# Patient Record
Sex: MALE | Race: OTHER | NOT HISPANIC OR LATINO | Employment: OTHER | URBAN - METROPOLITAN AREA
[De-identification: names, ages, dates, MRNs, and addresses within clinical notes are randomized per-mention and may not be internally consistent; named-entity substitution may affect disease eponyms.]

---

## 2017-01-18 ENCOUNTER — APPOINTMENT (INPATIENT)
Dept: ULTRASOUND IMAGING | Facility: HOSPITAL | Age: 62
DRG: 381 | End: 2017-01-18
Payer: MEDICARE

## 2017-01-18 ENCOUNTER — HOSPITAL ENCOUNTER (INPATIENT)
Facility: HOSPITAL | Age: 62
LOS: 5 days | Discharge: HOME HEALTH | DRG: 381 | End: 2017-01-23
Attending: INTERNAL MEDICINE | Admitting: INTERNAL MEDICINE
Payer: MEDICARE

## 2017-01-18 DIAGNOSIS — I25.10 CAD (CORONARY ARTERY DISEASE): Primary | ICD-10-CM

## 2017-01-18 DIAGNOSIS — C91.10 CLL (CHRONIC LYMPHOCYTIC LEUKEMIA) (HCC): ICD-10-CM

## 2017-01-18 DIAGNOSIS — B37.81 CANDIDA ESOPHAGITIS (HCC): ICD-10-CM

## 2017-01-18 DIAGNOSIS — N17.9 ACUTE RENAL FAILURE, UNSPECIFIED ACUTE RENAL FAILURE TYPE (HCC): ICD-10-CM

## 2017-01-18 PROBLEM — K21.00 GASTROESOPHAGEAL REFLUX DISEASE WITH ESOPHAGITIS: Status: ACTIVE | Noted: 2017-01-18

## 2017-01-18 LAB
ALBUMIN SERPL BCP-MCNC: 3.6 G/DL (ref 3.5–5)
ALP SERPL-CCNC: 46 U/L (ref 46–116)
ALT SERPL W P-5'-P-CCNC: 17 U/L (ref 12–78)
ANION GAP SERPL CALCULATED.3IONS-SCNC: 8 MMOL/L (ref 4–13)
AST SERPL W P-5'-P-CCNC: 15 U/L (ref 5–45)
ATRIAL RATE: 64 BPM
BASOPHILS # BLD MANUAL: 0 THOUSAND/UL (ref 0–0.1)
BASOPHILS NFR MAR MANUAL: 0 % (ref 0–1)
BILIRUB SERPL-MCNC: 0.41 MG/DL (ref 0.2–1)
BUN SERPL-MCNC: 26 MG/DL (ref 5–25)
CALCIUM SERPL-MCNC: 8.5 MG/DL (ref 8.3–10.1)
CHLORIDE SERPL-SCNC: 107 MMOL/L (ref 100–108)
CO2 SERPL-SCNC: 27 MMOL/L (ref 21–32)
CREAT SERPL-MCNC: 1.43 MG/DL (ref 0.6–1.3)
EOSINOPHIL # BLD MANUAL: 0.17 THOUSAND/UL (ref 0–0.4)
EOSINOPHIL NFR BLD MANUAL: 1 % (ref 0–6)
ERYTHROCYTE [DISTWIDTH] IN BLOOD BY AUTOMATED COUNT: 15 % (ref 11.6–15.1)
GFR SERPL CREATININE-BSD FRML MDRD: 50.1 ML/MIN/1.73SQ M
GLUCOSE SERPL-MCNC: 111 MG/DL (ref 65–140)
HCT VFR BLD AUTO: 32.1 % (ref 36.5–49.3)
HGB BLD-MCNC: 10.7 G/DL (ref 12–17)
INR PPP: 1.09 (ref 0.86–1.16)
LYMPHOCYTES # BLD AUTO: 15.31 THOUSAND/UL (ref 0.6–4.47)
LYMPHOCYTES # BLD AUTO: 90 % (ref 14–44)
MAGNESIUM SERPL-MCNC: 1.7 MG/DL (ref 1.6–2.6)
MCH RBC QN AUTO: 32.4 PG (ref 26.8–34.3)
MCHC RBC AUTO-ENTMCNC: 33.3 G/DL (ref 31.4–37.4)
MCV RBC AUTO: 97 FL (ref 82–98)
MONOCYTES # BLD AUTO: 0.17 THOUSAND/UL (ref 0–1.22)
MONOCYTES NFR BLD: 1 % (ref 4–12)
NEUTROPHILS # BLD MANUAL: 1.36 THOUSAND/UL (ref 1.85–7.62)
NEUTS SEG NFR BLD AUTO: 8 % (ref 43–75)
P AXIS: 33 DEGREES
PHOSPHATE SERPL-MCNC: 3.6 MG/DL (ref 2.3–4.1)
PLATELET # BLD AUTO: 62 THOUSANDS/UL (ref 149–390)
PLATELET BLD QL SMEAR: ABNORMAL
PMV BLD AUTO: 9.9 FL (ref 8.9–12.7)
POTASSIUM SERPL-SCNC: 3.9 MMOL/L (ref 3.5–5.3)
PR INTERVAL: 150 MS
PROT SERPL-MCNC: 5.7 G/DL (ref 6.4–8.2)
PROTHROMBIN TIME: 14.1 SECONDS (ref 12–14.3)
PTH-INTACT SERPL-MCNC: 39.8 PG/ML (ref 14–72)
QRS AXIS: -7 DEGREES
QRSD INTERVAL: 86 MS
QT INTERVAL: 432 MS
QTC INTERVAL: 445 MS
RBC # BLD AUTO: 3.3 MILLION/UL (ref 3.88–5.62)
RBC MORPH BLD: NORMAL
SODIUM SERPL-SCNC: 142 MMOL/L (ref 136–145)
T WAVE AXIS: 12 DEGREES
TOTAL CELLS COUNTED SPEC: 100
VENTRICULAR RATE: 64 BPM
WBC # BLD AUTO: 17.01 THOUSAND/UL (ref 4.31–10.16)

## 2017-01-18 PROCEDURE — 84165 PROTEIN E-PHORESIS SERUM: CPT | Performed by: INTERNAL MEDICINE

## 2017-01-18 PROCEDURE — 85610 PROTHROMBIN TIME: CPT | Performed by: INTERNAL MEDICINE

## 2017-01-18 PROCEDURE — 76770 US EXAM ABDO BACK WALL COMP: CPT

## 2017-01-18 PROCEDURE — 93005 ELECTROCARDIOGRAM TRACING: CPT | Performed by: INTERNAL MEDICINE

## 2017-01-18 PROCEDURE — 83970 ASSAY OF PARATHORMONE: CPT | Performed by: INTERNAL MEDICINE

## 2017-01-18 PROCEDURE — 85027 COMPLETE CBC AUTOMATED: CPT | Performed by: INTERNAL MEDICINE

## 2017-01-18 PROCEDURE — 80053 COMPREHEN METABOLIC PANEL: CPT | Performed by: INTERNAL MEDICINE

## 2017-01-18 PROCEDURE — 84100 ASSAY OF PHOSPHORUS: CPT | Performed by: INTERNAL MEDICINE

## 2017-01-18 PROCEDURE — 86334 IMMUNOFIX E-PHORESIS SERUM: CPT | Performed by: INTERNAL MEDICINE

## 2017-01-18 PROCEDURE — 85007 BL SMEAR W/DIFF WBC COUNT: CPT | Performed by: INTERNAL MEDICINE

## 2017-01-18 PROCEDURE — 83735 ASSAY OF MAGNESIUM: CPT | Performed by: INTERNAL MEDICINE

## 2017-01-18 RX ORDER — GABAPENTIN 100 MG/1
100 CAPSULE ORAL 2 TIMES DAILY
Status: DISCONTINUED | OUTPATIENT
Start: 2017-01-18 | End: 2017-01-23 | Stop reason: HOSPADM

## 2017-01-18 RX ORDER — DIPHENHYDRAMINE HCL 25 MG
25 TABLET ORAL EVERY 6 HOURS PRN
Status: DISCONTINUED | OUTPATIENT
Start: 2017-01-18 | End: 2017-01-18

## 2017-01-18 RX ORDER — RANOLAZINE 500 MG/1
500 TABLET, EXTENDED RELEASE ORAL DAILY
Status: DISCONTINUED | OUTPATIENT
Start: 2017-01-18 | End: 2017-01-23 | Stop reason: HOSPADM

## 2017-01-18 RX ORDER — MORPHINE SULFATE 2 MG/ML
2 INJECTION, SOLUTION INTRAMUSCULAR; INTRAVENOUS EVERY 4 HOURS PRN
Status: DISCONTINUED | OUTPATIENT
Start: 2017-01-18 | End: 2017-01-23 | Stop reason: HOSPADM

## 2017-01-18 RX ORDER — ASPIRIN 81 MG/1
81 TABLET, CHEWABLE ORAL DAILY
Status: DISCONTINUED | OUTPATIENT
Start: 2017-01-18 | End: 2017-01-23 | Stop reason: HOSPADM

## 2017-01-18 RX ORDER — METOPROLOL TARTRATE 50 MG/1
50 TABLET, FILM COATED ORAL DAILY
Status: DISCONTINUED | OUTPATIENT
Start: 2017-01-18 | End: 2017-01-23 | Stop reason: HOSPADM

## 2017-01-18 RX ORDER — SODIUM CHLORIDE 9 MG/ML
75 INJECTION, SOLUTION INTRAVENOUS CONTINUOUS
Status: DISCONTINUED | OUTPATIENT
Start: 2017-01-18 | End: 2017-01-23 | Stop reason: HOSPADM

## 2017-01-18 RX ORDER — HYDROCODONE BITARTRATE AND ACETAMINOPHEN 5; 325 MG/1; MG/1
1 TABLET ORAL EVERY 6 HOURS PRN
Status: DISCONTINUED | OUTPATIENT
Start: 2017-01-18 | End: 2017-01-23 | Stop reason: HOSPADM

## 2017-01-18 RX ORDER — DOCUSATE SODIUM 100 MG/1
100 CAPSULE, LIQUID FILLED ORAL 2 TIMES DAILY PRN
Status: DISCONTINUED | OUTPATIENT
Start: 2017-01-18 | End: 2017-01-23 | Stop reason: HOSPADM

## 2017-01-18 RX ORDER — FOLIC ACID 1 MG/1
1 TABLET ORAL DAILY
Status: DISCONTINUED | OUTPATIENT
Start: 2017-01-18 | End: 2017-01-23 | Stop reason: HOSPADM

## 2017-01-18 RX ORDER — CITALOPRAM 20 MG/1
20 TABLET ORAL DAILY
Status: DISCONTINUED | OUTPATIENT
Start: 2017-01-18 | End: 2017-01-23 | Stop reason: HOSPADM

## 2017-01-18 RX ORDER — FENOFIBRATE 67 MG/1
67 CAPSULE ORAL
Status: DISCONTINUED | OUTPATIENT
Start: 2017-01-19 | End: 2017-01-18

## 2017-01-18 RX ORDER — ALBUTEROL SULFATE 2.5 MG/3ML
2.5 SOLUTION RESPIRATORY (INHALATION) EVERY 6 HOURS PRN
Status: DISCONTINUED | OUTPATIENT
Start: 2017-01-18 | End: 2017-01-21

## 2017-01-18 RX ORDER — PREDNISONE 20 MG/1
10 TABLET ORAL DAILY
Status: DISCONTINUED | OUTPATIENT
Start: 2017-01-19 | End: 2017-01-22

## 2017-01-18 RX ORDER — ONDANSETRON 2 MG/ML
4 INJECTION INTRAMUSCULAR; INTRAVENOUS EVERY 4 HOURS PRN
Status: DISCONTINUED | OUTPATIENT
Start: 2017-01-18 | End: 2017-01-23 | Stop reason: HOSPADM

## 2017-01-18 RX ORDER — CLOPIDOGREL BISULFATE 75 MG/1
75 TABLET ORAL DAILY
Status: DISCONTINUED | OUTPATIENT
Start: 2017-01-18 | End: 2017-01-23 | Stop reason: HOSPADM

## 2017-01-18 RX ORDER — PREDNISONE 20 MG/1
20 TABLET ORAL DAILY
Status: DISCONTINUED | OUTPATIENT
Start: 2017-01-18 | End: 2017-01-18

## 2017-01-18 RX ORDER — ACETAMINOPHEN 325 MG/1
650 TABLET ORAL EVERY 6 HOURS PRN
Status: DISCONTINUED | OUTPATIENT
Start: 2017-01-18 | End: 2017-01-23 | Stop reason: HOSPADM

## 2017-01-18 RX ADMIN — ACYCLOVIR SODIUM 800 MG: 50 INJECTION, SOLUTION INTRAVENOUS at 22:05

## 2017-01-18 RX ADMIN — SODIUM CHLORIDE 100 ML/HR: 0.9 INJECTION, SOLUTION INTRAVENOUS at 10:36

## 2017-01-18 RX ADMIN — ACYCLOVIR SODIUM 800 MG: 50 INJECTION, SOLUTION INTRAVENOUS at 11:34

## 2017-01-18 RX ADMIN — GABAPENTIN 100 MG: 100 CAPSULE ORAL at 18:56

## 2017-01-18 RX ADMIN — SODIUM CHLORIDE 100 ML/HR: 0.9 INJECTION, SOLUTION INTRAVENOUS at 22:04

## 2017-01-19 LAB — 25(OH)D3 SERPL-MCNC: 39.7 NG/ML (ref 30–100)

## 2017-01-19 PROCEDURE — 94640 AIRWAY INHALATION TREATMENT: CPT

## 2017-01-19 PROCEDURE — 94760 N-INVAS EAR/PLS OXIMETRY 1: CPT

## 2017-01-19 PROCEDURE — 82306 VITAMIN D 25 HYDROXY: CPT | Performed by: INTERNAL MEDICINE

## 2017-01-19 RX ORDER — LISINOPRIL 10 MG/1
10 TABLET ORAL DAILY
Status: DISCONTINUED | OUTPATIENT
Start: 2017-01-19 | End: 2017-01-23 | Stop reason: HOSPADM

## 2017-01-19 RX ADMIN — GABAPENTIN 100 MG: 100 CAPSULE ORAL at 18:06

## 2017-01-19 RX ADMIN — ACYCLOVIR SODIUM 800 MG: 50 INJECTION, SOLUTION INTRAVENOUS at 11:13

## 2017-01-19 RX ADMIN — RANOLAZINE 500 MG: 500 TABLET, FILM COATED, EXTENDED RELEASE ORAL at 08:42

## 2017-01-19 RX ADMIN — GABAPENTIN 100 MG: 100 CAPSULE ORAL at 08:41

## 2017-01-19 RX ADMIN — PREDNISONE 10 MG: 20 TABLET ORAL at 08:41

## 2017-01-19 RX ADMIN — ASPIRIN 81 MG 81 MG: 81 TABLET ORAL at 08:41

## 2017-01-19 RX ADMIN — SODIUM CHLORIDE 100 ML/HR: 0.9 INJECTION, SOLUTION INTRAVENOUS at 19:52

## 2017-01-19 RX ADMIN — CLOPIDOGREL BISULFATE 75 MG: 75 TABLET ORAL at 08:41

## 2017-01-19 RX ADMIN — METOPROLOL TARTRATE 50 MG: 50 TABLET, FILM COATED ORAL at 08:41

## 2017-01-19 RX ADMIN — ACYCLOVIR SODIUM 800 MG: 50 INJECTION, SOLUTION INTRAVENOUS at 23:47

## 2017-01-19 RX ADMIN — FOLIC ACID 1 MG: 1 TABLET ORAL at 08:41

## 2017-01-19 RX ADMIN — SODIUM CHLORIDE 100 ML/HR: 0.9 INJECTION, SOLUTION INTRAVENOUS at 09:09

## 2017-01-19 RX ADMIN — CITALOPRAM HYDROBROMIDE 20 MG: 20 TABLET ORAL at 08:41

## 2017-01-19 RX ADMIN — LISINOPRIL 10 MG: 10 TABLET ORAL at 11:14

## 2017-01-19 RX ADMIN — ALBUTEROL SULFATE 2.5 MG: 2.5 SOLUTION RESPIRATORY (INHALATION) at 10:19

## 2017-01-20 LAB
ALBUMIN SERPL ELPH-MCNC: 3.71 G/DL (ref 3.5–5)
ALBUMIN SERPL ELPH-MCNC: 71.3 % (ref 52–65)
ALPHA1 GLOB SERPL ELPH-MCNC: 0.33 G/DL (ref 0.1–0.4)
ALPHA1 GLOB SERPL ELPH-MCNC: 6.4 % (ref 2.5–5)
ALPHA2 GLOB SERPL ELPH-MCNC: 0.6 G/DL (ref 0.4–1.2)
ALPHA2 GLOB SERPL ELPH-MCNC: 11.5 % (ref 7–13)
ANION GAP SERPL CALCULATED.3IONS-SCNC: 9 MMOL/L (ref 4–13)
BASOPHILS # BLD MANUAL: 0 THOUSAND/UL (ref 0–0.1)
BASOPHILS NFR MAR MANUAL: 0 % (ref 0–1)
BETA GLOB ABNORMAL SERPL ELPH-MCNC: 0.32 G/DL (ref 0.4–0.8)
BETA1 GLOB SERPL ELPH-MCNC: 6.2 % (ref 5–13)
BETA2 GLOB SERPL ELPH-MCNC: 3 % (ref 2–8)
BETA2+GAMMA GLOB SERPL ELPH-MCNC: 0.16 G/DL (ref 0.2–0.5)
BUN SERPL-MCNC: 23 MG/DL (ref 5–25)
CALCIUM SERPL-MCNC: 7.7 MG/DL (ref 8.3–10.1)
CHLORIDE SERPL-SCNC: 110 MMOL/L (ref 100–108)
CO2 SERPL-SCNC: 26 MMOL/L (ref 21–32)
CREAT SERPL-MCNC: 1.23 MG/DL (ref 0.6–1.3)
EOSINOPHIL # BLD MANUAL: 0.13 THOUSAND/UL (ref 0–0.4)
EOSINOPHIL NFR BLD MANUAL: 1 % (ref 0–6)
ERYTHROCYTE [DISTWIDTH] IN BLOOD BY AUTOMATED COUNT: 15.4 % (ref 11.6–15.1)
GAMMA GLOB ABNORMAL SERPL ELPH-MCNC: 0.08 G/DL (ref 0.5–1.6)
GAMMA GLOB SERPL ELPH-MCNC: 1.6 % (ref 12–22)
GFR SERPL CREATININE-BSD FRML MDRD: 59.6 ML/MIN/1.73SQ M
GLUCOSE SERPL-MCNC: 85 MG/DL (ref 65–140)
HCT VFR BLD AUTO: 29.6 % (ref 36.5–49.3)
HGB BLD-MCNC: 9.7 G/DL (ref 12–17)
IGG/ALB SER: 2.48 {RATIO} (ref 1.1–1.8)
INTERPRETATION UR IFE-IMP: NORMAL
LYMPHOCYTES # BLD AUTO: 11 THOUSAND/UL (ref 0.6–4.47)
LYMPHOCYTES # BLD AUTO: 87 % (ref 14–44)
M PROTEIN 1 MFR SERPL ELPH: 0.6 %
M PROTEIN 1 SERPL ELPH-MCNC: 0.03 G/DL
MCH RBC QN AUTO: 32 PG (ref 26.8–34.3)
MCHC RBC AUTO-ENTMCNC: 32.8 G/DL (ref 31.4–37.4)
MCV RBC AUTO: 98 FL (ref 82–98)
METAMYELOCYTES NFR BLD MANUAL: 2 % (ref 0–1)
MONOCYTES # BLD AUTO: 0.38 THOUSAND/UL (ref 0–1.22)
MONOCYTES NFR BLD: 3 % (ref 4–12)
NEUTROPHILS # BLD MANUAL: 0.63 THOUSAND/UL (ref 1.85–7.62)
NEUTS BAND NFR BLD MANUAL: 1 % (ref 0–8)
NEUTS SEG NFR BLD AUTO: 4 % (ref 43–75)
NRBC BLD AUTO-RTO: 0 /100 WBCS
PLATELET # BLD AUTO: 47 THOUSANDS/UL (ref 149–390)
PLATELET BLD QL SMEAR: ABNORMAL
PMV BLD AUTO: 10 FL (ref 8.9–12.7)
POTASSIUM SERPL-SCNC: 3.5 MMOL/L (ref 3.5–5.3)
PROT SERPL-MCNC: 5.2 G/DL (ref 6.4–8.2)
RBC # BLD AUTO: 3.03 MILLION/UL (ref 3.88–5.62)
RBC MORPH BLD: NORMAL
SODIUM SERPL-SCNC: 145 MMOL/L (ref 136–145)
TOTAL CELLS COUNTED SPEC: 100
VARIANT LYMPHS # BLD AUTO: 2 %
WBC # BLD AUTO: 12.64 THOUSAND/UL (ref 4.31–10.16)

## 2017-01-20 PROCEDURE — 94760 N-INVAS EAR/PLS OXIMETRY 1: CPT

## 2017-01-20 PROCEDURE — 85027 COMPLETE CBC AUTOMATED: CPT | Performed by: INTERNAL MEDICINE

## 2017-01-20 PROCEDURE — 80048 BASIC METABOLIC PNL TOTAL CA: CPT | Performed by: INTERNAL MEDICINE

## 2017-01-20 PROCEDURE — 85007 BL SMEAR W/DIFF WBC COUNT: CPT | Performed by: INTERNAL MEDICINE

## 2017-01-20 RX ADMIN — RANOLAZINE 500 MG: 500 TABLET, FILM COATED, EXTENDED RELEASE ORAL at 13:32

## 2017-01-20 RX ADMIN — SODIUM CHLORIDE 100 ML/HR: 0.9 INJECTION, SOLUTION INTRAVENOUS at 06:00

## 2017-01-20 RX ADMIN — GABAPENTIN 100 MG: 100 CAPSULE ORAL at 10:13

## 2017-01-20 RX ADMIN — LISINOPRIL 10 MG: 10 TABLET ORAL at 10:14

## 2017-01-20 RX ADMIN — SODIUM CHLORIDE 100 ML/HR: 0.9 INJECTION, SOLUTION INTRAVENOUS at 16:03

## 2017-01-20 RX ADMIN — ASPIRIN 81 MG 81 MG: 81 TABLET ORAL at 10:13

## 2017-01-20 RX ADMIN — CLOPIDOGREL BISULFATE 75 MG: 75 TABLET ORAL at 10:12

## 2017-01-20 RX ADMIN — ACYCLOVIR SODIUM 800 MG: 50 INJECTION, SOLUTION INTRAVENOUS at 10:19

## 2017-01-20 RX ADMIN — PREDNISONE 10 MG: 20 TABLET ORAL at 10:13

## 2017-01-20 RX ADMIN — CITALOPRAM HYDROBROMIDE 20 MG: 20 TABLET ORAL at 10:12

## 2017-01-20 RX ADMIN — METOPROLOL TARTRATE 50 MG: 50 TABLET, FILM COATED ORAL at 10:13

## 2017-01-20 RX ADMIN — FOLIC ACID 1 MG: 1 TABLET ORAL at 10:12

## 2017-01-20 RX ADMIN — GABAPENTIN 100 MG: 100 CAPSULE ORAL at 18:48

## 2017-01-21 LAB
ANION GAP SERPL CALCULATED.3IONS-SCNC: 8 MMOL/L (ref 4–13)
BUN SERPL-MCNC: 20 MG/DL (ref 5–25)
CALCIUM SERPL-MCNC: 8.4 MG/DL (ref 8.3–10.1)
CHLORIDE SERPL-SCNC: 110 MMOL/L (ref 100–108)
CO2 SERPL-SCNC: 25 MMOL/L (ref 21–32)
CREAT SERPL-MCNC: 1.26 MG/DL (ref 0.6–1.3)
GFR SERPL CREATININE-BSD FRML MDRD: 58 ML/MIN/1.73SQ M
GLUCOSE SERPL-MCNC: 80 MG/DL (ref 65–140)
POTASSIUM SERPL-SCNC: 3.4 MMOL/L (ref 3.5–5.3)
SODIUM SERPL-SCNC: 143 MMOL/L (ref 136–145)

## 2017-01-21 PROCEDURE — 80048 BASIC METABOLIC PNL TOTAL CA: CPT | Performed by: INTERNAL MEDICINE

## 2017-01-21 RX ORDER — POTASSIUM CHLORIDE 20 MEQ/1
20 TABLET, EXTENDED RELEASE ORAL DAILY
Status: DISCONTINUED | OUTPATIENT
Start: 2017-01-21 | End: 2017-01-23 | Stop reason: HOSPADM

## 2017-01-21 RX ORDER — POTASSIUM CHLORIDE 20 MEQ/1
20 TABLET, EXTENDED RELEASE ORAL ONCE
Status: COMPLETED | OUTPATIENT
Start: 2017-01-21 | End: 2017-01-21

## 2017-01-21 RX ADMIN — ACYCLOVIR SODIUM 600 MG: 50 INJECTION, SOLUTION INTRAVENOUS at 23:05

## 2017-01-21 RX ADMIN — ACYCLOVIR SODIUM 600 MG: 50 INJECTION, SOLUTION INTRAVENOUS at 00:04

## 2017-01-21 RX ADMIN — POTASSIUM CHLORIDE 20 MEQ: 1500 TABLET, EXTENDED RELEASE ORAL at 13:12

## 2017-01-21 RX ADMIN — GABAPENTIN 100 MG: 100 CAPSULE ORAL at 16:58

## 2017-01-21 RX ADMIN — FOLIC ACID 1 MG: 1 TABLET ORAL at 08:48

## 2017-01-21 RX ADMIN — ACYCLOVIR SODIUM 600 MG: 50 INJECTION, SOLUTION INTRAVENOUS at 11:36

## 2017-01-21 RX ADMIN — POTASSIUM CHLORIDE 20 MEQ: 1500 TABLET, EXTENDED RELEASE ORAL at 08:47

## 2017-01-21 RX ADMIN — PREDNISONE 10 MG: 20 TABLET ORAL at 08:48

## 2017-01-21 RX ADMIN — SODIUM CHLORIDE 100 ML/HR: 0.9 INJECTION, SOLUTION INTRAVENOUS at 04:20

## 2017-01-21 RX ADMIN — ACETAMINOPHEN 650 MG: 325 TABLET, FILM COATED ORAL at 05:55

## 2017-01-21 RX ADMIN — RANOLAZINE 500 MG: 500 TABLET, FILM COATED, EXTENDED RELEASE ORAL at 08:49

## 2017-01-21 RX ADMIN — METOPROLOL TARTRATE 50 MG: 50 TABLET, FILM COATED ORAL at 08:49

## 2017-01-21 RX ADMIN — CITALOPRAM HYDROBROMIDE 20 MG: 20 TABLET ORAL at 08:48

## 2017-01-21 RX ADMIN — SODIUM CHLORIDE 100 ML/HR: 0.9 INJECTION, SOLUTION INTRAVENOUS at 17:45

## 2017-01-21 RX ADMIN — ASPIRIN 81 MG 81 MG: 81 TABLET ORAL at 08:47

## 2017-01-21 RX ADMIN — GABAPENTIN 100 MG: 100 CAPSULE ORAL at 08:48

## 2017-01-21 RX ADMIN — CLOPIDOGREL BISULFATE 75 MG: 75 TABLET ORAL at 08:47

## 2017-01-21 RX ADMIN — LISINOPRIL 10 MG: 10 TABLET ORAL at 08:47

## 2017-01-22 LAB
ANION GAP SERPL CALCULATED.3IONS-SCNC: 9 MMOL/L (ref 4–13)
BUN SERPL-MCNC: 15 MG/DL (ref 5–25)
CALCIUM SERPL-MCNC: 8.4 MG/DL (ref 8.3–10.1)
CHLORIDE SERPL-SCNC: 111 MMOL/L (ref 100–108)
CO2 SERPL-SCNC: 25 MMOL/L (ref 21–32)
CREAT SERPL-MCNC: 1.38 MG/DL (ref 0.6–1.3)
GFR SERPL CREATININE-BSD FRML MDRD: 52.2 ML/MIN/1.73SQ M
GLUCOSE SERPL-MCNC: 75 MG/DL (ref 65–140)
POTASSIUM SERPL-SCNC: 3.3 MMOL/L (ref 3.5–5.3)
SODIUM SERPL-SCNC: 145 MMOL/L (ref 136–145)

## 2017-01-22 PROCEDURE — 80048 BASIC METABOLIC PNL TOTAL CA: CPT | Performed by: INTERNAL MEDICINE

## 2017-01-22 RX ORDER — PREDNISONE 1 MG/1
5 TABLET ORAL DAILY
Status: DISCONTINUED | OUTPATIENT
Start: 2017-01-23 | End: 2017-01-23 | Stop reason: HOSPADM

## 2017-01-22 RX ADMIN — ACYCLOVIR SODIUM 600 MG: 50 INJECTION, SOLUTION INTRAVENOUS at 22:31

## 2017-01-22 RX ADMIN — GABAPENTIN 100 MG: 100 CAPSULE ORAL at 17:42

## 2017-01-22 RX ADMIN — RANOLAZINE 500 MG: 500 TABLET, FILM COATED, EXTENDED RELEASE ORAL at 08:52

## 2017-01-22 RX ADMIN — HYDROCODONE BITARTRATE AND ACETAMINOPHEN 1 TABLET: 5; 325 TABLET ORAL at 06:06

## 2017-01-22 RX ADMIN — HYDROCODONE BITARTRATE AND ACETAMINOPHEN 1 TABLET: 5; 325 TABLET ORAL at 22:29

## 2017-01-22 RX ADMIN — SODIUM CHLORIDE 100 ML/HR: 0.9 INJECTION, SOLUTION INTRAVENOUS at 15:52

## 2017-01-22 RX ADMIN — LISINOPRIL 10 MG: 10 TABLET ORAL at 08:52

## 2017-01-22 RX ADMIN — SODIUM CHLORIDE 100 ML/HR: 0.9 INJECTION, SOLUTION INTRAVENOUS at 12:32

## 2017-01-22 RX ADMIN — CITALOPRAM HYDROBROMIDE 20 MG: 20 TABLET ORAL at 08:52

## 2017-01-22 RX ADMIN — FOLIC ACID 1 MG: 1 TABLET ORAL at 08:52

## 2017-01-22 RX ADMIN — ACYCLOVIR SODIUM 600 MG: 50 INJECTION, SOLUTION INTRAVENOUS at 12:32

## 2017-01-22 RX ADMIN — CLOPIDOGREL BISULFATE 75 MG: 75 TABLET ORAL at 08:52

## 2017-01-22 RX ADMIN — POTASSIUM CHLORIDE 20 MEQ: 1500 TABLET, EXTENDED RELEASE ORAL at 08:52

## 2017-01-22 RX ADMIN — METOPROLOL TARTRATE 50 MG: 50 TABLET, FILM COATED ORAL at 08:52

## 2017-01-22 RX ADMIN — ACETAMINOPHEN 650 MG: 325 TABLET, FILM COATED ORAL at 15:52

## 2017-01-22 RX ADMIN — PREDNISONE 10 MG: 20 TABLET ORAL at 08:52

## 2017-01-22 RX ADMIN — GABAPENTIN 100 MG: 100 CAPSULE ORAL at 08:52

## 2017-01-22 RX ADMIN — ASPIRIN 81 MG 81 MG: 81 TABLET ORAL at 08:52

## 2017-01-23 VITALS
DIASTOLIC BLOOD PRESSURE: 92 MMHG | HEIGHT: 73 IN | WEIGHT: 207.23 LBS | RESPIRATION RATE: 18 BRPM | HEART RATE: 72 BPM | TEMPERATURE: 98.6 F | OXYGEN SATURATION: 96 % | BODY MASS INDEX: 27.47 KG/M2 | SYSTOLIC BLOOD PRESSURE: 142 MMHG

## 2017-01-23 LAB
ALBUMIN SERPL BCP-MCNC: 3.5 G/DL (ref 3.5–5)
ALP SERPL-CCNC: 40 U/L (ref 46–116)
ALT SERPL W P-5'-P-CCNC: 23 U/L (ref 12–78)
ANION GAP SERPL CALCULATED.3IONS-SCNC: 8 MMOL/L (ref 4–13)
AST SERPL W P-5'-P-CCNC: 21 U/L (ref 5–45)
BASOPHILS # BLD MANUAL: 0 THOUSAND/UL (ref 0–0.1)
BASOPHILS NFR MAR MANUAL: 0 % (ref 0–1)
BILIRUB SERPL-MCNC: 0.43 MG/DL (ref 0.2–1)
BUN SERPL-MCNC: 15 MG/DL (ref 5–25)
CALCIUM SERPL-MCNC: 8.7 MG/DL (ref 8.3–10.1)
CHLORIDE SERPL-SCNC: 111 MMOL/L (ref 100–108)
CO2 SERPL-SCNC: 25 MMOL/L (ref 21–32)
CREAT SERPL-MCNC: 1.28 MG/DL (ref 0.6–1.3)
EOSINOPHIL # BLD MANUAL: 0.34 THOUSAND/UL (ref 0–0.4)
EOSINOPHIL NFR BLD MANUAL: 3 % (ref 0–6)
ERYTHROCYTE [DISTWIDTH] IN BLOOD BY AUTOMATED COUNT: 15.3 % (ref 11.6–15.1)
GFR SERPL CREATININE-BSD FRML MDRD: 56.9 ML/MIN/1.73SQ M
GLUCOSE SERPL-MCNC: 77 MG/DL (ref 65–140)
HCT VFR BLD AUTO: 29.6 % (ref 36.5–49.3)
HGB BLD-MCNC: 9.7 G/DL (ref 12–17)
LYMPHOCYTES # BLD AUTO: 83 % (ref 14–44)
LYMPHOCYTES # BLD AUTO: 9.32 THOUSAND/UL (ref 0.6–4.47)
MCH RBC QN AUTO: 31.9 PG (ref 26.8–34.3)
MCHC RBC AUTO-ENTMCNC: 32.8 G/DL (ref 31.4–37.4)
MCV RBC AUTO: 97 FL (ref 82–98)
MONOCYTES # BLD AUTO: 0.11 THOUSAND/UL (ref 0–1.22)
MONOCYTES NFR BLD: 1 % (ref 4–12)
NEUTROPHILS # BLD MANUAL: 1.46 THOUSAND/UL (ref 1.85–7.62)
NEUTS BAND NFR BLD MANUAL: 1 % (ref 0–8)
NEUTS SEG NFR BLD AUTO: 12 % (ref 43–75)
PLATELET # BLD AUTO: 55 THOUSANDS/UL (ref 149–390)
PLATELET BLD QL SMEAR: ABNORMAL
PMV BLD AUTO: 9.9 FL (ref 8.9–12.7)
POTASSIUM SERPL-SCNC: 3.6 MMOL/L (ref 3.5–5.3)
PROT SERPL-MCNC: 5.4 G/DL (ref 6.4–8.2)
RBC # BLD AUTO: 3.04 MILLION/UL (ref 3.88–5.62)
RBC MORPH BLD: NORMAL
SODIUM SERPL-SCNC: 144 MMOL/L (ref 136–145)
TOTAL CELLS COUNTED SPEC: 100
WBC # BLD AUTO: 11.23 THOUSAND/UL (ref 4.31–10.16)

## 2017-01-23 PROCEDURE — 80053 COMPREHEN METABOLIC PANEL: CPT | Performed by: INTERNAL MEDICINE

## 2017-01-23 PROCEDURE — 85007 BL SMEAR W/DIFF WBC COUNT: CPT | Performed by: INTERNAL MEDICINE

## 2017-01-23 PROCEDURE — 85027 COMPLETE CBC AUTOMATED: CPT | Performed by: INTERNAL MEDICINE

## 2017-01-23 RX ORDER — CITALOPRAM 20 MG/1
20 TABLET ORAL DAILY
Qty: 30 TABLET | Refills: 0 | Status: SHIPPED | OUTPATIENT
Start: 2017-01-23 | End: 2017-03-06 | Stop reason: SDUPTHER

## 2017-01-23 RX ORDER — LISINOPRIL 10 MG/1
10 TABLET ORAL DAILY
Qty: 30 TABLET | Refills: 0 | Status: SHIPPED | OUTPATIENT
Start: 2017-01-23 | End: 2017-03-06 | Stop reason: SDUPTHER

## 2017-01-23 RX ORDER — PREDNISONE 1 MG/1
5 TABLET ORAL DAILY
Qty: 30 TABLET | Refills: 0 | Status: SHIPPED | OUTPATIENT
Start: 2017-01-23 | End: 2017-02-22

## 2017-01-23 RX ORDER — DOCUSATE SODIUM 100 MG/1
100 CAPSULE, LIQUID FILLED ORAL 2 TIMES DAILY PRN
Qty: 10 CAPSULE | Refills: 0 | Status: SHIPPED | OUTPATIENT
Start: 2017-01-23 | End: 2017-01-24 | Stop reason: ALTCHOICE

## 2017-01-23 RX ADMIN — CITALOPRAM HYDROBROMIDE 20 MG: 20 TABLET ORAL at 08:49

## 2017-01-23 RX ADMIN — GABAPENTIN 100 MG: 100 CAPSULE ORAL at 08:49

## 2017-01-23 RX ADMIN — FOLIC ACID 1 MG: 1 TABLET ORAL at 08:49

## 2017-01-23 RX ADMIN — LISINOPRIL 10 MG: 10 TABLET ORAL at 08:49

## 2017-01-23 RX ADMIN — ASPIRIN 81 MG 81 MG: 81 TABLET ORAL at 08:49

## 2017-01-23 RX ADMIN — CLOPIDOGREL BISULFATE 75 MG: 75 TABLET ORAL at 08:49

## 2017-01-23 RX ADMIN — ACYCLOVIR SODIUM 600 MG: 50 INJECTION, SOLUTION INTRAVENOUS at 11:34

## 2017-01-23 RX ADMIN — POTASSIUM CHLORIDE 20 MEQ: 1500 TABLET, EXTENDED RELEASE ORAL at 08:49

## 2017-01-23 RX ADMIN — RANOLAZINE 500 MG: 500 TABLET, FILM COATED, EXTENDED RELEASE ORAL at 08:52

## 2017-01-23 RX ADMIN — METOPROLOL TARTRATE 50 MG: 50 TABLET, FILM COATED ORAL at 08:49

## 2017-01-23 RX ADMIN — ACETAMINOPHEN 650 MG: 325 TABLET, FILM COATED ORAL at 05:18

## 2017-01-23 RX ADMIN — PREDNISONE 5 MG: 5 TABLET ORAL at 08:49

## 2017-01-24 ENCOUNTER — HOSPITAL ENCOUNTER (OUTPATIENT)
Dept: INFUSION CENTER | Facility: CLINIC | Age: 62
Discharge: HOME | End: 2017-01-24
Payer: MEDICARE

## 2017-01-24 VITALS — TEMPERATURE: 98.5 F | RESPIRATION RATE: 16 BRPM | HEART RATE: 80 BPM

## 2017-01-24 LAB
ALBUMIN SERPL BCP-MCNC: 3.6 G/DL (ref 3.5–5)
ALP SERPL-CCNC: 43 U/L (ref 46–116)
ALT SERPL W P-5'-P-CCNC: 24 U/L (ref 12–78)
ANION GAP SERPL CALCULATED.3IONS-SCNC: 10 MMOL/L (ref 4–13)
ANISOCYTOSIS BLD QL SMEAR: PRESENT
AST SERPL W P-5'-P-CCNC: 23 U/L (ref 5–45)
BASOPHILS # BLD AUTO: 0 THOUSAND/UL (ref 0–0.1)
BASOPHILS NFR MAR MANUAL: 0 % (ref 0–1)
BILIRUB SERPL-MCNC: 0.51 MG/DL (ref 0.2–1)
BUN SERPL-MCNC: 13 MG/DL (ref 5–25)
CALCIUM SERPL-MCNC: 8.8 MG/DL (ref 8.3–10.1)
CHLORIDE SERPL-SCNC: 108 MMOL/L (ref 100–108)
CO2 SERPL-SCNC: 26 MMOL/L (ref 21–32)
CREAT SERPL-MCNC: 1.47 MG/DL (ref 0.6–1.3)
EOSINOPHIL # BLD AUTO: 0 THOUSAND/UL (ref 0–0.61)
EOSINOPHIL NFR BLD MANUAL: 0 % (ref 0–6)
ERYTHROCYTE [DISTWIDTH] IN BLOOD BY AUTOMATED COUNT: 16.2 % (ref 11.6–15.1)
GFR SERPL CREATININE-BSD FRML MDRD: 48.5 ML/MIN/1.73SQ M
GLUCOSE SERPL-MCNC: 102 MG/DL (ref 65–140)
HCT VFR BLD AUTO: 28.7 % (ref 36.5–49.3)
HGB BLD-MCNC: 9.6 G/DL (ref 12–17)
IGG SERPL-MCNC: 44 MG/DL (ref 700–1600)
LDH SERPL-CCNC: 197 U/L (ref 81–234)
LYMPHOCYTES # BLD AUTO: 75 % (ref 14–44)
LYMPHOCYTES # BLD AUTO: 9.15 THOUSAND/UL (ref 0.6–4.47)
MCH RBC QN AUTO: 32.3 PG (ref 26.8–34.3)
MCHC RBC AUTO-ENTMCNC: 33.3 G/DL (ref 31.4–37.4)
MCV RBC AUTO: 97 FL (ref 82–98)
METAMYELOCYTES NFR BLD MANUAL: 1 % (ref 0–1)
MONOCYTES # BLD AUTO: 0.24 THOUSAND/UL (ref 0–1.22)
MONOCYTES NFR BLD AUTO: 2 % (ref 4–12)
NEUTS BAND NFR BLD MANUAL: 0 % (ref 0–8)
NEUTS SEG # BLD: 1.22 THOUSAND/UL (ref 1.81–6.82)
NEUTS SEG NFR BLD AUTO: 10 % (ref 43–75)
PLATELET # BLD AUTO: 47 THOUSANDS/UL (ref 149–390)
PLATELET BLD QL SMEAR: ABNORMAL
PMV BLD AUTO: 7.2 FL (ref 8.9–12.7)
POTASSIUM SERPL-SCNC: 3.7 MMOL/L (ref 3.5–5.3)
PROT SERPL-MCNC: 5.5 G/DL (ref 6.4–8.2)
RBC # BLD AUTO: 2.96 MILLION/UL (ref 3.88–5.62)
RETICS # AUTO: ABNORMAL 10*3/UL (ref 14356–105094)
RETICS # CALC: 2.58 % (ref 0.37–1.87)
SODIUM SERPL-SCNC: 144 MMOL/L (ref 136–145)
TOTAL CELLS COUNTED SPEC: 100
URATE SERPL-MCNC: 7.1 MG/DL (ref 4.2–8)
VARIANT LYMPHS # BLD AUTO: 12 % (ref 0–0)
WBC # BLD AUTO: 12.2 THOUSAND/UL (ref 4.31–10.16)
WBC NRBC COR # BLD: 12.2 THOUSAND/UL (ref 4.31–10.16)

## 2017-01-24 PROCEDURE — 85045 AUTOMATED RETICULOCYTE COUNT: CPT | Performed by: INTERNAL MEDICINE

## 2017-01-24 PROCEDURE — 85027 COMPLETE CBC AUTOMATED: CPT | Performed by: INTERNAL MEDICINE

## 2017-01-24 PROCEDURE — 80053 COMPREHEN METABOLIC PANEL: CPT | Performed by: INTERNAL MEDICINE

## 2017-01-24 PROCEDURE — 82232 ASSAY OF BETA-2 PROTEIN: CPT | Performed by: INTERNAL MEDICINE

## 2017-01-24 PROCEDURE — 83615 LACTATE (LD) (LDH) ENZYME: CPT | Performed by: INTERNAL MEDICINE

## 2017-01-24 PROCEDURE — 85007 BL SMEAR W/DIFF WBC COUNT: CPT | Performed by: INTERNAL MEDICINE

## 2017-01-24 PROCEDURE — 82784 ASSAY IGA/IGD/IGG/IGM EACH: CPT | Performed by: INTERNAL MEDICINE

## 2017-01-24 PROCEDURE — 84550 ASSAY OF BLOOD/URIC ACID: CPT | Performed by: INTERNAL MEDICINE

## 2017-01-24 NOTE — PROGRESS NOTES
A call placed to [place] nurse to make aware of today's creatinine level  [place] [facility] nurse returned call  and made aware of creatinine level today   Creatinine level-1 47 reported to [place] nurse who stated she will make Dr Peewee Russ aware that patient's labs are available on line and to review

## 2017-01-24 NOTE — PROGRESS NOTES
Stephania Henderson RN with Dr Krystal Ramesh  returned call and made her aware of patient's  recent hospitalization and that patient  had labs drawn today and will be on IV antibiotics for next 2 weeks

## 2017-01-24 NOTE — PROGRESS NOTES
Patient arrived on unit for lab draw and port flush  Patient stated he was hospitalized for 1 week for "stomache ulcer" and D/C yesterday  Patient is receiving IV antibiotics via port a cath at home  Labs drawn for Dr Ester Mathews as per ordered  Patient deaccessed and reaccessed again as it was 1 week ago that patient was accessed  Patient remained accessed on D/C  Patient stated home health nurse was coming today to teach patient  Instructed patient to ask visiting nurse if they will be doing cath maintenance  Instructed  patient that port needs to be reaccessed weekly  Verbalized understanding  Patient aware of next infusion appointment for labs

## 2017-01-25 LAB — B2 MICROGLOB SERPL-MCNC: 7 MG/L (ref 0.6–2.4)

## 2017-01-26 ENCOUNTER — LAB REQUISITION (OUTPATIENT)
Dept: LAB | Facility: HOSPITAL | Age: 62
End: 2017-01-26
Payer: MEDICARE

## 2017-01-26 DIAGNOSIS — B37.81 CANDIDAL ESOPHAGITIS (HCC): ICD-10-CM

## 2017-01-26 LAB
ANION GAP SERPL CALCULATED.3IONS-SCNC: 11 MMOL/L (ref 4–13)
ANISOCYTOSIS BLD QL SMEAR: PRESENT
BASOPHILS # BLD MANUAL: 0 THOUSAND/UL (ref 0–0.1)
BASOPHILS NFR MAR MANUAL: 0 % (ref 0–1)
BUN SERPL-MCNC: 14 MG/DL (ref 5–25)
CALCIUM SERPL-MCNC: 8.6 MG/DL (ref 8.3–10.1)
CHLORIDE SERPL-SCNC: 109 MMOL/L (ref 100–108)
CO2 SERPL-SCNC: 25 MMOL/L (ref 21–32)
CREAT SERPL-MCNC: 1.38 MG/DL (ref 0.6–1.3)
DACRYOCYTES BLD QL SMEAR: PRESENT
EOSINOPHIL # BLD MANUAL: 0 THOUSAND/UL (ref 0–0.4)
EOSINOPHIL NFR BLD MANUAL: 0 % (ref 0–6)
ERYTHROCYTE [DISTWIDTH] IN BLOOD BY AUTOMATED COUNT: 16.1 % (ref 11.6–15.1)
GFR SERPL CREATININE-BSD FRML MDRD: 52.2 ML/MIN/1.73SQ M
GIANT PLATELETS BLD QL SMEAR: PRESENT
GLUCOSE SERPL-MCNC: 108 MG/DL (ref 65–140)
HCT VFR BLD AUTO: 31.7 % (ref 36.5–49.3)
HGB BLD-MCNC: 10.6 G/DL (ref 12–17)
LYMPHOCYTES # BLD AUTO: 7.91 THOUSAND/UL (ref 0.6–4.47)
LYMPHOCYTES # BLD AUTO: 82 % (ref 14–44)
MCH RBC QN AUTO: 32.7 PG (ref 26.8–34.3)
MCHC RBC AUTO-ENTMCNC: 33.4 G/DL (ref 31.4–37.4)
MCV RBC AUTO: 98 FL (ref 82–98)
MONOCYTES # BLD AUTO: 0.29 THOUSAND/UL (ref 0–1.22)
MONOCYTES NFR BLD: 3 % (ref 4–12)
NEUTROPHILS # BLD MANUAL: 0.77 THOUSAND/UL (ref 1.85–7.62)
NEUTS SEG NFR BLD AUTO: 8 % (ref 43–75)
NRBC BLD AUTO-RTO: 0 /100 WBCS
PLATELET # BLD AUTO: 50 THOUSANDS/UL (ref 149–390)
PLATELET BLD QL SMEAR: ABNORMAL
PMV BLD AUTO: 10.6 FL (ref 8.9–12.7)
POIKILOCYTOSIS BLD QL SMEAR: PRESENT
POTASSIUM SERPL-SCNC: 3.8 MMOL/L (ref 3.5–5.3)
RBC # BLD AUTO: 3.24 MILLION/UL (ref 3.88–5.62)
RBC MORPH BLD: PRESENT
SODIUM SERPL-SCNC: 145 MMOL/L (ref 136–145)
VARIANT LYMPHS # BLD AUTO: 7 %
WBC # BLD AUTO: 9.65 THOUSAND/UL (ref 4.31–10.16)

## 2017-01-26 PROCEDURE — 80048 BASIC METABOLIC PNL TOTAL CA: CPT | Performed by: INTERNAL MEDICINE

## 2017-01-26 PROCEDURE — 85007 BL SMEAR W/DIFF WBC COUNT: CPT | Performed by: INTERNAL MEDICINE

## 2017-01-26 PROCEDURE — 85027 COMPLETE CBC AUTOMATED: CPT | Performed by: INTERNAL MEDICINE

## 2017-01-27 ENCOUNTER — GENERIC CONVERSION - ENCOUNTER (OUTPATIENT)
Dept: OTHER | Facility: OTHER | Age: 62
End: 2017-01-27

## 2017-01-30 ENCOUNTER — LAB REQUISITION (OUTPATIENT)
Dept: LAB | Facility: HOSPITAL | Age: 62
End: 2017-01-30
Payer: MEDICARE

## 2017-01-30 DIAGNOSIS — B37.81 CANDIDAL ESOPHAGITIS (HCC): ICD-10-CM

## 2017-01-30 LAB
ANION GAP SERPL CALCULATED.3IONS-SCNC: 10 MMOL/L (ref 4–13)
BASOPHILS # BLD MANUAL: 0 THOUSAND/UL (ref 0–0.1)
BASOPHILS NFR MAR MANUAL: 0 % (ref 0–1)
BUN SERPL-MCNC: 19 MG/DL (ref 5–25)
CALCIUM SERPL-MCNC: 8.8 MG/DL (ref 8.3–10.1)
CHLORIDE SERPL-SCNC: 104 MMOL/L (ref 100–108)
CO2 SERPL-SCNC: 25 MMOL/L (ref 21–32)
CREAT SERPL-MCNC: 1.47 MG/DL (ref 0.6–1.3)
EOSINOPHIL # BLD MANUAL: 0 THOUSAND/UL (ref 0–0.4)
EOSINOPHIL NFR BLD MANUAL: 0 % (ref 0–6)
ERYTHROCYTE [DISTWIDTH] IN BLOOD BY AUTOMATED COUNT: 15.8 % (ref 11.6–15.1)
GFR SERPL CREATININE-BSD FRML MDRD: 48.5 ML/MIN/1.73SQ M
GLUCOSE SERPL-MCNC: 134 MG/DL (ref 65–140)
HCT VFR BLD AUTO: 31.8 % (ref 36.5–49.3)
HGB BLD-MCNC: 10.7 G/DL (ref 12–17)
LYMPHOCYTES # BLD AUTO: 18.74 THOUSAND/UL (ref 0.6–4.47)
LYMPHOCYTES # BLD AUTO: 80 % (ref 14–44)
MCH RBC QN AUTO: 32.6 PG (ref 26.8–34.3)
MCHC RBC AUTO-ENTMCNC: 33.6 G/DL (ref 31.4–37.4)
MCV RBC AUTO: 97 FL (ref 82–98)
MONOCYTES # BLD AUTO: 1.41 THOUSAND/UL (ref 0–1.22)
MONOCYTES NFR BLD: 6 % (ref 4–12)
MYELOCYTES NFR BLD MANUAL: 1 % (ref 0–1)
NEUTROPHILS # BLD MANUAL: 1.64 THOUSAND/UL (ref 1.85–7.62)
NEUTS SEG NFR BLD AUTO: 7 % (ref 43–75)
PLATELET # BLD AUTO: 59 THOUSANDS/UL (ref 149–390)
PLATELET BLD QL SMEAR: ABNORMAL
PMV BLD AUTO: 10.2 FL (ref 8.9–12.7)
POTASSIUM SERPL-SCNC: 4.1 MMOL/L (ref 3.5–5.3)
RBC # BLD AUTO: 3.28 MILLION/UL (ref 3.88–5.62)
SODIUM SERPL-SCNC: 139 MMOL/L (ref 136–145)
TOTAL CELLS COUNTED SPEC: 100
VARIANT LYMPHS # BLD AUTO: 6 %
WBC # BLD AUTO: 23.43 THOUSAND/UL (ref 4.31–10.16)

## 2017-01-30 PROCEDURE — 85007 BL SMEAR W/DIFF WBC COUNT: CPT | Performed by: INTERNAL MEDICINE

## 2017-01-30 PROCEDURE — 85027 COMPLETE CBC AUTOMATED: CPT | Performed by: INTERNAL MEDICINE

## 2017-01-30 PROCEDURE — 80048 BASIC METABOLIC PNL TOTAL CA: CPT | Performed by: INTERNAL MEDICINE

## 2017-02-01 ENCOUNTER — ALLSCRIPTS OFFICE VISIT (OUTPATIENT)
Dept: OTHER | Facility: OTHER | Age: 62
End: 2017-02-01

## 2017-02-02 ENCOUNTER — LAB REQUISITION (OUTPATIENT)
Dept: LAB | Facility: HOSPITAL | Age: 62
End: 2017-02-02
Payer: MEDICARE

## 2017-02-02 DIAGNOSIS — K22.10 ULCER OF ESOPHAGUS WITHOUT BLEEDING: ICD-10-CM

## 2017-02-02 DIAGNOSIS — B37.81 CANDIDAL ESOPHAGITIS (HCC): ICD-10-CM

## 2017-02-02 LAB
ANION GAP SERPL CALCULATED.3IONS-SCNC: 11 MMOL/L (ref 4–13)
ANISOCYTOSIS BLD QL SMEAR: PRESENT
BASOPHILS # BLD MANUAL: 0 THOUSAND/UL (ref 0–0.1)
BASOPHILS NFR MAR MANUAL: 0 % (ref 0–1)
BUN SERPL-MCNC: 25 MG/DL (ref 5–25)
CALCIUM SERPL-MCNC: 8.9 MG/DL (ref 8.3–10.1)
CHLORIDE SERPL-SCNC: 106 MMOL/L (ref 100–108)
CO2 SERPL-SCNC: 26 MMOL/L (ref 21–32)
CREAT SERPL-MCNC: 1.55 MG/DL (ref 0.6–1.3)
EOSINOPHIL # BLD MANUAL: 0.57 THOUSAND/UL (ref 0–0.4)
EOSINOPHIL NFR BLD MANUAL: 2 % (ref 0–6)
ERYTHROCYTE [DISTWIDTH] IN BLOOD BY AUTOMATED COUNT: 16 % (ref 11.6–15.1)
GFR SERPL CREATININE-BSD FRML MDRD: 45.7 ML/MIN/1.73SQ M
GLUCOSE SERPL-MCNC: 96 MG/DL (ref 65–140)
HCT VFR BLD AUTO: 32.6 % (ref 36.5–49.3)
HGB BLD-MCNC: 11.1 G/DL (ref 12–17)
LYMPHOCYTES # BLD AUTO: 22.32 THOUSAND/UL (ref 0.6–4.47)
LYMPHOCYTES # BLD AUTO: 78 % (ref 14–44)
MCH RBC QN AUTO: 32.7 PG (ref 26.8–34.3)
MCHC RBC AUTO-ENTMCNC: 34 G/DL (ref 31.4–37.4)
MCV RBC AUTO: 96 FL (ref 82–98)
MONOCYTES # BLD AUTO: 0 THOUSAND/UL (ref 0–1.22)
MONOCYTES NFR BLD: 0 % (ref 4–12)
NEUTROPHILS # BLD MANUAL: 0.86 THOUSAND/UL (ref 1.85–7.62)
NEUTS SEG NFR BLD AUTO: 3 % (ref 43–75)
PLATELET # BLD AUTO: 61 THOUSANDS/UL (ref 149–390)
PLATELET BLD QL SMEAR: ABNORMAL
PMV BLD AUTO: 10.6 FL (ref 8.9–12.7)
POLYCHROMASIA BLD QL SMEAR: PRESENT
POTASSIUM SERPL-SCNC: 4.3 MMOL/L (ref 3.5–5.3)
RBC # BLD AUTO: 3.39 MILLION/UL (ref 3.88–5.62)
RBC MORPH BLD: PRESENT
SODIUM SERPL-SCNC: 143 MMOL/L (ref 136–145)
TOTAL CELLS COUNTED SPEC: 100
VARIANT LYMPHS # BLD AUTO: 17 %
WBC # BLD AUTO: 28.61 THOUSAND/UL (ref 4.31–10.16)

## 2017-02-02 PROCEDURE — 85007 BL SMEAR W/DIFF WBC COUNT: CPT | Performed by: INTERNAL MEDICINE

## 2017-02-02 PROCEDURE — 80048 BASIC METABOLIC PNL TOTAL CA: CPT | Performed by: INTERNAL MEDICINE

## 2017-02-02 PROCEDURE — 85027 COMPLETE CBC AUTOMATED: CPT | Performed by: INTERNAL MEDICINE

## 2017-02-06 ENCOUNTER — LAB REQUISITION (OUTPATIENT)
Dept: LAB | Facility: HOSPITAL | Age: 62
End: 2017-02-06
Payer: MEDICARE

## 2017-02-06 DIAGNOSIS — K22.10 ULCER OF ESOPHAGUS WITHOUT BLEEDING: ICD-10-CM

## 2017-02-06 DIAGNOSIS — B37.81 CANDIDAL ESOPHAGITIS (HCC): ICD-10-CM

## 2017-02-06 LAB
ANION GAP SERPL CALCULATED.3IONS-SCNC: 11 MMOL/L (ref 4–13)
ANISOCYTOSIS BLD QL SMEAR: PRESENT
BASOPHILS # BLD MANUAL: 0 THOUSAND/UL (ref 0–0.1)
BASOPHILS NFR MAR MANUAL: 0 % (ref 0–1)
BUN SERPL-MCNC: 28 MG/DL (ref 5–25)
CALCIUM SERPL-MCNC: 8.9 MG/DL (ref 8.3–10.1)
CHLORIDE SERPL-SCNC: 105 MMOL/L (ref 100–108)
CO2 SERPL-SCNC: 25 MMOL/L (ref 21–32)
CREAT SERPL-MCNC: 1.6 MG/DL (ref 0.6–1.3)
EOSINOPHIL # BLD MANUAL: 0 THOUSAND/UL (ref 0–0.4)
EOSINOPHIL NFR BLD MANUAL: 0 % (ref 0–6)
ERYTHROCYTE [DISTWIDTH] IN BLOOD BY AUTOMATED COUNT: 16.3 % (ref 11.6–15.1)
GFR SERPL CREATININE-BSD FRML MDRD: 44 ML/MIN/1.73SQ M
GLUCOSE SERPL-MCNC: 107 MG/DL (ref 65–140)
HCT VFR BLD AUTO: 34.3 % (ref 36.5–49.3)
HGB BLD-MCNC: 11.6 G/DL (ref 12–17)
LYMPHOCYTES # BLD AUTO: 26.03 THOUSAND/UL (ref 0.6–4.47)
LYMPHOCYTES # BLD AUTO: 79 % (ref 14–44)
MCH RBC QN AUTO: 32.8 PG (ref 26.8–34.3)
MCHC RBC AUTO-ENTMCNC: 33.8 G/DL (ref 31.4–37.4)
MCV RBC AUTO: 97 FL (ref 82–98)
MICROCYTES BLD QL AUTO: PRESENT
MONOCYTES # BLD AUTO: 0 THOUSAND/UL (ref 0–1.22)
MONOCYTES NFR BLD: 0 % (ref 4–12)
NEUTROPHILS # BLD MANUAL: 0.99 THOUSAND/UL (ref 1.85–7.62)
NEUTS SEG NFR BLD AUTO: 3 % (ref 43–75)
PLATELET # BLD AUTO: 68 THOUSANDS/UL (ref 149–390)
PLATELET BLD QL SMEAR: ABNORMAL
PMV BLD AUTO: 10.4 FL (ref 8.9–12.7)
POLYCHROMASIA BLD QL SMEAR: PRESENT
POTASSIUM SERPL-SCNC: 4.6 MMOL/L (ref 3.5–5.3)
RBC # BLD AUTO: 3.54 MILLION/UL (ref 3.88–5.62)
RBC MORPH BLD: PRESENT
SODIUM SERPL-SCNC: 141 MMOL/L (ref 136–145)
TOTAL CELLS COUNTED SPEC: 100
VARIANT LYMPHS # BLD AUTO: 18 %
WBC # BLD AUTO: 32.95 THOUSAND/UL (ref 4.31–10.16)

## 2017-02-06 PROCEDURE — 85007 BL SMEAR W/DIFF WBC COUNT: CPT | Performed by: INTERNAL MEDICINE

## 2017-02-06 PROCEDURE — 85027 COMPLETE CBC AUTOMATED: CPT | Performed by: INTERNAL MEDICINE

## 2017-02-06 PROCEDURE — 80048 BASIC METABOLIC PNL TOTAL CA: CPT | Performed by: INTERNAL MEDICINE

## 2017-02-20 ENCOUNTER — HOSPITAL ENCOUNTER (OUTPATIENT)
Dept: INFUSION CENTER | Facility: CLINIC | Age: 62
Discharge: HOME | End: 2017-02-20
Payer: MEDICARE

## 2017-02-20 VITALS — HEART RATE: 75 BPM | TEMPERATURE: 98.1 F | RESPIRATION RATE: 18 BRPM

## 2017-02-20 LAB
ACANTHOCYTES BLD QL SMEAR: PRESENT
ALBUMIN SERPL BCP-MCNC: 3.7 G/DL (ref 3.5–5)
ALP SERPL-CCNC: 48 U/L (ref 46–116)
ALT SERPL W P-5'-P-CCNC: 18 U/L (ref 12–78)
ANION GAP SERPL CALCULATED.3IONS-SCNC: 8 MMOL/L (ref 4–13)
AST SERPL W P-5'-P-CCNC: 23 U/L (ref 5–45)
BASOPHILS # BLD AUTO: 0 THOUSAND/UL (ref 0–0.1)
BASOPHILS NFR MAR MANUAL: 0 % (ref 0–1)
BILIRUB SERPL-MCNC: 0.43 MG/DL (ref 0.2–1)
BUN SERPL-MCNC: 22 MG/DL (ref 5–25)
CALCIUM SERPL-MCNC: 8.5 MG/DL (ref 8.3–10.1)
CHLORIDE SERPL-SCNC: 104 MMOL/L (ref 100–108)
CO2 SERPL-SCNC: 28 MMOL/L (ref 21–32)
CREAT SERPL-MCNC: 1.49 MG/DL (ref 0.6–1.3)
EOSINOPHIL # BLD AUTO: 0 THOUSAND/UL (ref 0–0.61)
EOSINOPHIL NFR BLD MANUAL: 0 % (ref 0–6)
ERYTHROCYTE [DISTWIDTH] IN BLOOD BY AUTOMATED COUNT: 18.8 % (ref 11.6–15.1)
GFR SERPL CREATININE-BSD FRML MDRD: 47.8 ML/MIN/1.73SQ M
GLUCOSE SERPL-MCNC: 123 MG/DL (ref 65–140)
HCT VFR BLD AUTO: 29.8 % (ref 36.5–49.3)
HGB BLD-MCNC: 9.9 G/DL (ref 12–17)
IGG SERPL-MCNC: 48 MG/DL (ref 700–1600)
LYMPHOCYTES # BLD AUTO: 32.64 THOUSAND/UL (ref 0.6–4.47)
LYMPHOCYTES # BLD AUTO: 93 % (ref 14–44)
MCH RBC QN AUTO: 32.4 PG (ref 26.8–34.3)
MCHC RBC AUTO-ENTMCNC: 33.3 G/DL (ref 31.4–37.4)
MCV RBC AUTO: 97 FL (ref 82–98)
MONOCYTES # BLD AUTO: 0.7 THOUSAND/UL (ref 0–1.22)
MONOCYTES NFR BLD AUTO: 2 % (ref 4–12)
NEUTS BAND NFR BLD MANUAL: 0 % (ref 0–8)
NEUTS SEG # BLD: 1.76 THOUSAND/UL (ref 1.81–6.82)
NEUTS SEG NFR BLD AUTO: 5 % (ref 43–75)
PLATELET # BLD AUTO: 52 THOUSANDS/UL (ref 149–390)
PLATELET BLD QL SMEAR: ABNORMAL
PMV BLD AUTO: 7.3 FL (ref 8.9–12.7)
POTASSIUM SERPL-SCNC: 4.1 MMOL/L (ref 3.5–5.3)
PROT SERPL-MCNC: 5.9 G/DL (ref 6.4–8.2)
RBC # BLD AUTO: 3.07 MILLION/UL (ref 3.88–5.62)
RETICS # AUTO: NORMAL 10*3/UL (ref 14356–105094)
RETICS # CALC: 1.67 % (ref 0.37–1.87)
SODIUM SERPL-SCNC: 140 MMOL/L (ref 136–145)
TOTAL CELLS COUNTED SPEC: 100
URATE SERPL-MCNC: 5.4 MG/DL (ref 4.2–8)
WBC # BLD AUTO: 35.1 THOUSAND/UL (ref 4.31–10.16)
WBC NRBC COR # BLD: 35.1 THOUSAND/UL (ref 4.31–10.16)

## 2017-02-20 PROCEDURE — 82232 ASSAY OF BETA-2 PROTEIN: CPT | Performed by: INTERNAL MEDICINE

## 2017-02-20 PROCEDURE — 83625 ASSAY OF LDH ENZYMES: CPT | Performed by: INTERNAL MEDICINE

## 2017-02-20 PROCEDURE — 85027 COMPLETE CBC AUTOMATED: CPT | Performed by: INTERNAL MEDICINE

## 2017-02-20 PROCEDURE — 82784 ASSAY IGA/IGD/IGG/IGM EACH: CPT | Performed by: INTERNAL MEDICINE

## 2017-02-20 PROCEDURE — 85045 AUTOMATED RETICULOCYTE COUNT: CPT | Performed by: INTERNAL MEDICINE

## 2017-02-20 PROCEDURE — 83615 LACTATE (LD) (LDH) ENZYME: CPT | Performed by: INTERNAL MEDICINE

## 2017-02-20 PROCEDURE — 84550 ASSAY OF BLOOD/URIC ACID: CPT | Performed by: INTERNAL MEDICINE

## 2017-02-20 PROCEDURE — 80053 COMPREHEN METABOLIC PANEL: CPT | Performed by: INTERNAL MEDICINE

## 2017-02-20 PROCEDURE — 85007 BL SMEAR W/DIFF WBC COUNT: CPT | Performed by: INTERNAL MEDICINE

## 2017-02-21 LAB
B2 MICROGLOB SERPL-MCNC: 6.9 MG/L (ref 0.6–2.4)
LDH SERPL-CCNC: 179 IU/L (ref 121–224)
LDH1 CFR SERPL ELPH: 25 % (ref 17–32)
LDH2 CFR SERPL ELPH: 36 % (ref 25–40)
LDH3 CFR SERPL ELPH: 25 % (ref 17–27)
LDH4 CFR SERPL ELPH: 9 % (ref 5–13)
LDH5 CFR SERPL ELPH: 5 % (ref 4–20)

## 2017-02-22 ENCOUNTER — ALLSCRIPTS OFFICE VISIT (OUTPATIENT)
Dept: OTHER | Facility: OTHER | Age: 62
End: 2017-02-22

## 2017-02-22 ENCOUNTER — HOSPITAL ENCOUNTER (OUTPATIENT)
Dept: INFUSION CENTER | Facility: CLINIC | Age: 62
Discharge: HOME | End: 2017-02-22
Payer: MEDICARE

## 2017-02-22 PROCEDURE — 88373 M/PHMTRC ALYS ISHQUANT/SEMIQ: CPT | Performed by: INTERNAL MEDICINE

## 2017-02-22 PROCEDURE — 88367 INSITU HYBRIDIZATION AUTO: CPT | Performed by: INTERNAL MEDICINE

## 2017-03-01 ENCOUNTER — GENERIC CONVERSION - ENCOUNTER (OUTPATIENT)
Dept: OTHER | Facility: OTHER | Age: 62
End: 2017-03-01

## 2017-03-01 DIAGNOSIS — C91.10 CHRONIC LYMPHOCYTIC LEUKEMIA OF B-CELL TYPE NOT HAVING ACHIEVED REMISSION (HCC): ICD-10-CM

## 2017-03-01 DIAGNOSIS — D58.9 HEREDITARY HEMOLYTIC ANEMIA (HCC): ICD-10-CM

## 2017-03-01 LAB — SCAN RESULT: NORMAL

## 2017-03-02 ENCOUNTER — GENERIC CONVERSION - ENCOUNTER (OUTPATIENT)
Dept: OTHER | Facility: OTHER | Age: 62
End: 2017-03-02

## 2017-03-06 ENCOUNTER — LAB (OUTPATIENT)
Dept: LAB | Facility: HOSPITAL | Age: 62
DRG: 813 | End: 2017-03-06
Attending: INTERNAL MEDICINE
Payer: MEDICARE

## 2017-03-06 ENCOUNTER — APPOINTMENT (EMERGENCY)
Dept: RADIOLOGY | Facility: HOSPITAL | Age: 62
DRG: 813 | End: 2017-03-06
Payer: MEDICARE

## 2017-03-06 ENCOUNTER — GENERIC CONVERSION - ENCOUNTER (OUTPATIENT)
Dept: OTHER | Facility: OTHER | Age: 62
End: 2017-03-06

## 2017-03-06 ENCOUNTER — HOSPITAL ENCOUNTER (INPATIENT)
Facility: HOSPITAL | Age: 62
LOS: 2 days | Discharge: HOME | DRG: 813 | End: 2017-03-08
Attending: EMERGENCY MEDICINE | Admitting: INTERNAL MEDICINE
Payer: MEDICARE

## 2017-03-06 DIAGNOSIS — C91.10 CHRONIC LYMPHOCYTIC LEUKEMIA OF B-CELL TYPE NOT HAVING ACHIEVED REMISSION (HCC): ICD-10-CM

## 2017-03-06 DIAGNOSIS — R05.9 COUGH: ICD-10-CM

## 2017-03-06 DIAGNOSIS — C91.10 CLL (CHRONIC LYMPHOCYTIC LEUKEMIA) (HCC): ICD-10-CM

## 2017-03-06 DIAGNOSIS — R06.02 SOB (SHORTNESS OF BREATH): Primary | ICD-10-CM

## 2017-03-06 PROBLEM — D75.829 HIT (HEPARIN-INDUCED THROMBOCYTOPENIA): Status: ACTIVE | Noted: 2017-03-06

## 2017-03-06 PROBLEM — D75.82 HIT (HEPARIN-INDUCED THROMBOCYTOPENIA) (HCC): Status: ACTIVE | Noted: 2017-03-06

## 2017-03-06 PROBLEM — R89.9 ABNORMAL LABORATORY TEST RESULT: Status: ACTIVE | Noted: 2017-03-06

## 2017-03-06 LAB
ABO GROUP BLD: NORMAL
ANION GAP SERPL CALCULATED.3IONS-SCNC: 10 MMOL/L (ref 4–13)
ANISOCYTOSIS BLD QL SMEAR: PRESENT
ANISOCYTOSIS BLD QL SMEAR: PRESENT
BASOPHILS # BLD MANUAL: 0 THOUSAND/UL (ref 0–0.1)
BASOPHILS # BLD MANUAL: 0 THOUSAND/UL (ref 0–0.1)
BASOPHILS NFR MAR MANUAL: 0 % (ref 0–1)
BASOPHILS NFR MAR MANUAL: 0 % (ref 0–1)
BLD GP AB SCN SERPL QL: NEGATIVE
BUN SERPL-MCNC: 26 MG/DL (ref 5–25)
CALCIUM SERPL-MCNC: 8.3 MG/DL (ref 8.3–10.1)
CHLORIDE SERPL-SCNC: 104 MMOL/L (ref 100–108)
CO2 SERPL-SCNC: 25 MMOL/L (ref 21–32)
CREAT SERPL-MCNC: 1.66 MG/DL (ref 0.6–1.3)
EOSINOPHIL # BLD MANUAL: 0.54 THOUSAND/UL (ref 0–0.4)
EOSINOPHIL # BLD MANUAL: 0.55 THOUSAND/UL (ref 0–0.4)
EOSINOPHIL NFR BLD MANUAL: 1 % (ref 0–6)
EOSINOPHIL NFR BLD MANUAL: 1 % (ref 0–6)
ERYTHROCYTE [DISTWIDTH] IN BLOOD BY AUTOMATED COUNT: 18.1 % (ref 11.6–15.1)
ERYTHROCYTE [DISTWIDTH] IN BLOOD BY AUTOMATED COUNT: 18.1 % (ref 11.6–15.1)
GFR SERPL CREATININE-BSD FRML MDRD: 42.2 ML/MIN/1.73SQ M
GLUCOSE SERPL-MCNC: 96 MG/DL (ref 65–140)
HCT VFR BLD AUTO: 24.5 % (ref 36.5–49.3)
HCT VFR BLD AUTO: 26.6 % (ref 36.5–49.3)
HGB BLD-MCNC: 8.1 G/DL (ref 12–17)
HGB BLD-MCNC: 8.9 G/DL (ref 12–17)
LYMPHOCYTES # BLD AUTO: 48.79 THOUSAND/UL (ref 0.6–4.47)
LYMPHOCYTES # BLD AUTO: 51.44 THOUSAND/UL (ref 0.6–4.47)
LYMPHOCYTES # BLD AUTO: 89 % (ref 14–44)
LYMPHOCYTES # BLD AUTO: 95 % (ref 14–44)
MCH RBC QN AUTO: 32.9 PG (ref 26.8–34.3)
MCH RBC QN AUTO: 33.2 PG (ref 26.8–34.3)
MCHC RBC AUTO-ENTMCNC: 33.1 G/DL (ref 31.4–37.4)
MCHC RBC AUTO-ENTMCNC: 33.5 G/DL (ref 31.4–37.4)
MCV RBC AUTO: 100 FL (ref 82–98)
MCV RBC AUTO: 99 FL (ref 82–98)
MONOCYTES # BLD AUTO: 0 THOUSAND/UL (ref 0–1.22)
MONOCYTES # BLD AUTO: 1.1 THOUSAND/UL (ref 0–1.22)
MONOCYTES NFR BLD: 0 % (ref 4–12)
MONOCYTES NFR BLD: 2 % (ref 4–12)
NEUTROPHILS # BLD MANUAL: 1.08 THOUSAND/UL (ref 1.85–7.62)
NEUTROPHILS # BLD MANUAL: 2.19 THOUSAND/UL (ref 1.85–7.62)
NEUTS SEG NFR BLD AUTO: 2 % (ref 43–75)
NEUTS SEG NFR BLD AUTO: 4 % (ref 43–75)
NRBC BLD AUTO-RTO: 0 /100 WBCS
NRBC BLD AUTO-RTO: 0 /100 WBCS
PLATELET # BLD AUTO: 46 THOUSANDS/UL (ref 149–390)
PLATELET # BLD AUTO: 48 THOUSANDS/UL (ref 149–390)
PLATELET BLD QL SMEAR: ABNORMAL
PLATELET BLD QL SMEAR: ABNORMAL
PMV BLD AUTO: 11.2 FL (ref 8.9–12.7)
PMV BLD AUTO: 11.3 FL (ref 8.9–12.7)
POTASSIUM SERPL-SCNC: 4 MMOL/L (ref 3.5–5.3)
RBC # BLD AUTO: 2.46 MILLION/UL (ref 3.88–5.62)
RBC # BLD AUTO: 2.68 MILLION/UL (ref 3.88–5.62)
RH BLD: POSITIVE
SMUDGE CELLS BLD QL SMEAR: PRESENT
SODIUM SERPL-SCNC: 139 MMOL/L (ref 136–145)
TOTAL CELLS COUNTED SPEC: 100
TOTAL CELLS COUNTED SPEC: 100
VARIANT LYMPHS # BLD AUTO: 2 %
VARIANT LYMPHS # BLD AUTO: 4 %
WBC # BLD AUTO: 54.15 THOUSAND/UL (ref 4.31–10.16)
WBC # BLD AUTO: 54.82 THOUSAND/UL (ref 4.31–10.16)

## 2017-03-06 PROCEDURE — 85027 COMPLETE CBC AUTOMATED: CPT | Performed by: EMERGENCY MEDICINE

## 2017-03-06 PROCEDURE — 85027 COMPLETE CBC AUTOMATED: CPT

## 2017-03-06 PROCEDURE — 86921 COMPATIBILITY TEST INCUBATE: CPT

## 2017-03-06 PROCEDURE — 85007 BL SMEAR W/DIFF WBC COUNT: CPT

## 2017-03-06 PROCEDURE — 86850 RBC ANTIBODY SCREEN: CPT

## 2017-03-06 PROCEDURE — 80048 BASIC METABOLIC PNL TOTAL CA: CPT | Performed by: EMERGENCY MEDICINE

## 2017-03-06 PROCEDURE — 86901 BLOOD TYPING SEROLOGIC RH(D): CPT

## 2017-03-06 PROCEDURE — 85007 BL SMEAR W/DIFF WBC COUNT: CPT | Performed by: EMERGENCY MEDICINE

## 2017-03-06 PROCEDURE — 99284 EMERGENCY DEPT VISIT MOD MDM: CPT

## 2017-03-06 PROCEDURE — 86922 COMPATIBILITY TEST ANTIGLOB: CPT

## 2017-03-06 PROCEDURE — 36415 COLL VENOUS BLD VENIPUNCTURE: CPT

## 2017-03-06 PROCEDURE — 86900 BLOOD TYPING SEROLOGIC ABO: CPT

## 2017-03-06 PROCEDURE — 71020 HB CHEST X-RAY 2VW FRONTAL&LATL: CPT

## 2017-03-06 RX ORDER — CLOPIDOGREL BISULFATE 75 MG/1
75 TABLET ORAL DAILY
Status: DISCONTINUED | OUTPATIENT
Start: 2017-03-07 | End: 2017-03-07

## 2017-03-06 RX ORDER — PANTOPRAZOLE SODIUM 40 MG/1
40 TABLET, DELAYED RELEASE ORAL
Status: DISCONTINUED | OUTPATIENT
Start: 2017-03-07 | End: 2017-03-08 | Stop reason: HOSPADM

## 2017-03-06 RX ORDER — SUCRALFATE ORAL 1 G/10ML
1000 SUSPENSION ORAL
Status: DISCONTINUED | OUTPATIENT
Start: 2017-03-07 | End: 2017-03-08 | Stop reason: HOSPADM

## 2017-03-06 RX ORDER — HYDRALAZINE HYDROCHLORIDE 20 MG/ML
10 INJECTION INTRAMUSCULAR; INTRAVENOUS EVERY 4 HOURS PRN
Status: DISCONTINUED | OUTPATIENT
Start: 2017-03-06 | End: 2017-03-08 | Stop reason: HOSPADM

## 2017-03-06 RX ORDER — PREDNISONE 1 MG/1
5 TABLET ORAL DAILY
Status: DISCONTINUED | OUTPATIENT
Start: 2017-03-07 | End: 2017-03-07

## 2017-03-06 RX ORDER — CITALOPRAM 20 MG/1
20 TABLET ORAL DAILY
COMMUNITY
End: 2017-04-19 | Stop reason: ALTCHOICE

## 2017-03-06 RX ORDER — ACETAMINOPHEN 325 MG/1
650 TABLET ORAL EVERY 6 HOURS PRN
Status: DISCONTINUED | OUTPATIENT
Start: 2017-03-06 | End: 2017-03-08 | Stop reason: HOSPADM

## 2017-03-06 RX ORDER — FAMOTIDINE 20 MG/1
20 TABLET, FILM COATED ORAL 2 TIMES DAILY
Status: DISCONTINUED | OUTPATIENT
Start: 2017-03-06 | End: 2017-03-08 | Stop reason: HOSPADM

## 2017-03-06 RX ORDER — ASPIRIN 81 MG/1
81 TABLET, CHEWABLE ORAL DAILY
Status: DISCONTINUED | OUTPATIENT
Start: 2017-03-07 | End: 2017-03-07

## 2017-03-06 RX ORDER — LEVALBUTEROL 1.25 MG/.5ML
1.25 SOLUTION, CONCENTRATE RESPIRATORY (INHALATION)
Status: DISCONTINUED | OUTPATIENT
Start: 2017-03-06 | End: 2017-03-06

## 2017-03-06 RX ORDER — ONDANSETRON 2 MG/ML
4 INJECTION INTRAMUSCULAR; INTRAVENOUS EVERY 6 HOURS PRN
Status: DISCONTINUED | OUTPATIENT
Start: 2017-03-06 | End: 2017-03-08 | Stop reason: HOSPADM

## 2017-03-06 RX ORDER — ALBUTEROL SULFATE 90 UG/1
2 AEROSOL, METERED RESPIRATORY (INHALATION) EVERY 4 HOURS PRN
Status: DISCONTINUED | OUTPATIENT
Start: 2017-03-06 | End: 2017-03-08 | Stop reason: HOSPADM

## 2017-03-06 RX ORDER — PANTOPRAZOLE SODIUM 40 MG/1
40 TABLET, DELAYED RELEASE ORAL DAILY
COMMUNITY
End: 2017-04-19 | Stop reason: ALTCHOICE

## 2017-03-06 RX ORDER — FOLIC ACID 1 MG/1
1 TABLET ORAL DAILY
Status: DISCONTINUED | OUTPATIENT
Start: 2017-03-07 | End: 2017-03-08 | Stop reason: HOSPADM

## 2017-03-06 RX ORDER — CITALOPRAM 20 MG/1
20 TABLET ORAL DAILY
Status: DISCONTINUED | OUTPATIENT
Start: 2017-03-07 | End: 2017-03-08 | Stop reason: HOSPADM

## 2017-03-06 RX ORDER — LISINOPRIL 20 MG/1
20 TABLET ORAL DAILY
COMMUNITY
End: 2017-04-07

## 2017-03-06 RX ORDER — RANOLAZINE 500 MG/1
500 TABLET, EXTENDED RELEASE ORAL DAILY
Status: DISCONTINUED | OUTPATIENT
Start: 2017-03-07 | End: 2017-03-08 | Stop reason: HOSPADM

## 2017-03-06 RX ORDER — GUAIFENESIN 600 MG
600 TABLET, EXTENDED RELEASE 12 HR ORAL 2 TIMES DAILY
Status: DISCONTINUED | OUTPATIENT
Start: 2017-03-06 | End: 2017-03-08 | Stop reason: HOSPADM

## 2017-03-06 RX ORDER — PREDNISONE 1 MG/1
5 TABLET ORAL DAILY
COMMUNITY
End: 2017-03-10 | Stop reason: HOSPADM

## 2017-03-06 RX ORDER — PRAVASTATIN SODIUM 80 MG/1
80 TABLET ORAL
Status: DISCONTINUED | OUTPATIENT
Start: 2017-03-07 | End: 2017-03-08 | Stop reason: HOSPADM

## 2017-03-06 RX ORDER — GABAPENTIN 100 MG/1
100 CAPSULE ORAL 2 TIMES DAILY
Status: DISCONTINUED | OUTPATIENT
Start: 2017-03-06 | End: 2017-03-08 | Stop reason: HOSPADM

## 2017-03-06 RX ORDER — SODIUM CHLORIDE 9 MG/ML
60 INJECTION, SOLUTION INTRAVENOUS CONTINUOUS
Status: DISCONTINUED | OUTPATIENT
Start: 2017-03-06 | End: 2017-03-08

## 2017-03-06 RX ORDER — METOPROLOL TARTRATE 50 MG/1
50 TABLET, FILM COATED ORAL DAILY
Status: DISCONTINUED | OUTPATIENT
Start: 2017-03-07 | End: 2017-03-08 | Stop reason: HOSPADM

## 2017-03-06 RX ADMIN — SODIUM CHLORIDE 60 ML/HR: 0.9 INJECTION, SOLUTION INTRAVENOUS at 22:30

## 2017-03-06 RX ADMIN — FAMOTIDINE 20 MG: 20 TABLET ORAL at 22:35

## 2017-03-06 RX ADMIN — GUAIFENESIN 600 MG: 600 TABLET, EXTENDED RELEASE ORAL at 22:35

## 2017-03-06 RX ADMIN — GABAPENTIN 100 MG: 100 CAPSULE ORAL at 22:35

## 2017-03-07 ENCOUNTER — HOSPITAL ENCOUNTER (OUTPATIENT)
Dept: INFUSION CENTER | Facility: CLINIC | Age: 62
Discharge: HOME | End: 2017-03-07
Payer: COMMERCIAL

## 2017-03-07 PROBLEM — D64.9 SYMPTOMATIC ANEMIA: Status: ACTIVE | Noted: 2017-03-06

## 2017-03-07 LAB
ABO GROUP BLD BPU: NORMAL
ABO GROUP BLD BPU: NORMAL
ABO GROUP BLD: NORMAL
ANION GAP SERPL CALCULATED.3IONS-SCNC: 8 MMOL/L (ref 4–13)
BLD GP AB SCN SERPL QL: NEGATIVE
BPU ID: NORMAL
BPU ID: NORMAL
BUN SERPL-MCNC: 24 MG/DL (ref 5–25)
CALCIUM SERPL-MCNC: 8.1 MG/DL (ref 8.3–10.1)
CHLORIDE SERPL-SCNC: 108 MMOL/L (ref 100–108)
CO2 SERPL-SCNC: 25 MMOL/L (ref 21–32)
CREAT SERPL-MCNC: 1.72 MG/DL (ref 0.6–1.3)
CROSSMATCH: NORMAL
CROSSMATCH: NORMAL
ERYTHROCYTE [DISTWIDTH] IN BLOOD BY AUTOMATED COUNT: 18.2 % (ref 11.6–15.1)
GFR SERPL CREATININE-BSD FRML MDRD: 40.5 ML/MIN/1.73SQ M
GLUCOSE SERPL-MCNC: 89 MG/DL (ref 65–140)
GLUCOSE SERPL-MCNC: 94 MG/DL (ref 65–140)
HCT VFR BLD AUTO: 21.1 % (ref 36.5–49.3)
HCT VFR BLD AUTO: 25.9 % (ref 36.5–49.3)
HGB BLD-MCNC: 7.1 G/DL (ref 12–17)
HGB BLD-MCNC: 8.8 G/DL (ref 12–17)
MCH RBC QN AUTO: 33.6 PG (ref 26.8–34.3)
MCHC RBC AUTO-ENTMCNC: 33.6 G/DL (ref 31.4–37.4)
MCV RBC AUTO: 100 FL (ref 82–98)
PLATELET # BLD AUTO: 33 THOUSANDS/UL (ref 149–390)
PMV BLD AUTO: 10.8 FL (ref 8.9–12.7)
POTASSIUM SERPL-SCNC: 3.9 MMOL/L (ref 3.5–5.3)
RBC # BLD AUTO: 2.11 MILLION/UL (ref 3.88–5.62)
RH BLD: POSITIVE
SODIUM SERPL-SCNC: 141 MMOL/L (ref 136–145)
UNIT DISPENSE STATUS: NORMAL
UNIT DISPENSE STATUS: NORMAL
UNIT PRODUCT CODE: NORMAL
UNIT PRODUCT CODE: NORMAL
UNIT RH: NORMAL
UNIT RH: NORMAL
WBC # BLD AUTO: 40.65 THOUSAND/UL (ref 4.31–10.16)

## 2017-03-07 PROCEDURE — 30233N1 TRANSFUSION OF NONAUTOLOGOUS RED BLOOD CELLS INTO PERIPHERAL VEIN, PERCUTANEOUS APPROACH: ICD-10-PCS | Performed by: INTERNAL MEDICINE

## 2017-03-07 PROCEDURE — 80048 BASIC METABOLIC PNL TOTAL CA: CPT | Performed by: PHYSICIAN ASSISTANT

## 2017-03-07 PROCEDURE — 86921 COMPATIBILITY TEST INCUBATE: CPT

## 2017-03-07 PROCEDURE — 85018 HEMOGLOBIN: CPT | Performed by: INTERNAL MEDICINE

## 2017-03-07 PROCEDURE — 86901 BLOOD TYPING SEROLOGIC RH(D): CPT | Performed by: INTERNAL MEDICINE

## 2017-03-07 PROCEDURE — 85027 COMPLETE CBC AUTOMATED: CPT | Performed by: PHYSICIAN ASSISTANT

## 2017-03-07 PROCEDURE — 86900 BLOOD TYPING SEROLOGIC ABO: CPT | Performed by: INTERNAL MEDICINE

## 2017-03-07 PROCEDURE — 86850 RBC ANTIBODY SCREEN: CPT | Performed by: INTERNAL MEDICINE

## 2017-03-07 PROCEDURE — P9040 RBC LEUKOREDUCED IRRADIATED: HCPCS

## 2017-03-07 PROCEDURE — 86922 COMPATIBILITY TEST ANTIGLOB: CPT

## 2017-03-07 PROCEDURE — 85014 HEMATOCRIT: CPT | Performed by: INTERNAL MEDICINE

## 2017-03-07 PROCEDURE — 82948 REAGENT STRIP/BLOOD GLUCOSE: CPT

## 2017-03-07 RX ORDER — PREDNISONE 1 MG/1
15 TABLET ORAL DAILY
Status: DISCONTINUED | OUTPATIENT
Start: 2017-03-08 | End: 2017-03-08 | Stop reason: HOSPADM

## 2017-03-07 RX ADMIN — SUCRALFATE 1000 MG: 1 SUSPENSION ORAL at 17:12

## 2017-03-07 RX ADMIN — PANTOPRAZOLE SODIUM 40 MG: 40 TABLET, DELAYED RELEASE ORAL at 06:24

## 2017-03-07 RX ADMIN — GABAPENTIN 100 MG: 100 CAPSULE ORAL at 17:11

## 2017-03-07 RX ADMIN — RANOLAZINE 500 MG: 500 TABLET, FILM COATED, EXTENDED RELEASE ORAL at 09:51

## 2017-03-07 RX ADMIN — FOLIC ACID 1 MG: 1 TABLET ORAL at 09:51

## 2017-03-07 RX ADMIN — ALBUTEROL SULFATE 2 PUFF: 90 AEROSOL, METERED RESPIRATORY (INHALATION) at 15:38

## 2017-03-07 RX ADMIN — METOPROLOL TARTRATE 50 MG: 50 TABLET ORAL at 09:51

## 2017-03-07 RX ADMIN — CITALOPRAM HYDROBROMIDE 20 MG: 20 TABLET ORAL at 09:51

## 2017-03-07 RX ADMIN — FAMOTIDINE 20 MG: 20 TABLET ORAL at 09:51

## 2017-03-07 RX ADMIN — PRAVASTATIN SODIUM 80 MG: 80 TABLET ORAL at 17:11

## 2017-03-07 RX ADMIN — SUCRALFATE 1000 MG: 1 SUSPENSION ORAL at 06:24

## 2017-03-07 RX ADMIN — PREDNISONE 5 MG: 5 TABLET ORAL at 09:51

## 2017-03-07 RX ADMIN — FAMOTIDINE 20 MG: 20 TABLET ORAL at 17:11

## 2017-03-07 RX ADMIN — GUAIFENESIN 600 MG: 600 TABLET, EXTENDED RELEASE ORAL at 17:11

## 2017-03-07 RX ADMIN — CLOPIDOGREL BISULFATE 75 MG: 75 TABLET ORAL at 09:51

## 2017-03-07 RX ADMIN — ASPIRIN 81 MG: 81 TABLET, CHEWABLE ORAL at 09:51

## 2017-03-07 RX ADMIN — GABAPENTIN 100 MG: 100 CAPSULE ORAL at 09:51

## 2017-03-07 RX ADMIN — GUAIFENESIN 600 MG: 600 TABLET, EXTENDED RELEASE ORAL at 09:51

## 2017-03-08 ENCOUNTER — GENERIC CONVERSION - ENCOUNTER (OUTPATIENT)
Dept: OTHER | Facility: OTHER | Age: 62
End: 2017-03-08

## 2017-03-08 VITALS
OXYGEN SATURATION: 96 % | HEART RATE: 88 BPM | TEMPERATURE: 98.3 F | DIASTOLIC BLOOD PRESSURE: 68 MMHG | HEIGHT: 74 IN | WEIGHT: 200.18 LBS | BODY MASS INDEX: 25.69 KG/M2 | RESPIRATION RATE: 20 BRPM | SYSTOLIC BLOOD PRESSURE: 132 MMHG

## 2017-03-08 LAB
ABO GROUP BLD BPU: NORMAL
ABO GROUP BLD BPU: NORMAL
ANION GAP SERPL CALCULATED.3IONS-SCNC: 10 MMOL/L (ref 4–13)
ANISOCYTOSIS BLD QL SMEAR: PRESENT
BASOPHILS # BLD MANUAL: 0 THOUSAND/UL (ref 0–0.1)
BASOPHILS NFR MAR MANUAL: 0 % (ref 0–1)
BPU ID: NORMAL
BPU ID: NORMAL
BUN SERPL-MCNC: 22 MG/DL (ref 5–25)
CALCIUM SERPL-MCNC: 8.1 MG/DL (ref 8.3–10.1)
CHLORIDE SERPL-SCNC: 109 MMOL/L (ref 100–108)
CO2 SERPL-SCNC: 23 MMOL/L (ref 21–32)
CREAT SERPL-MCNC: 1.55 MG/DL (ref 0.6–1.3)
CROSSMATCH: NORMAL
CROSSMATCH: NORMAL
EOSINOPHIL # BLD MANUAL: 0 THOUSAND/UL (ref 0–0.4)
EOSINOPHIL NFR BLD MANUAL: 0 % (ref 0–6)
ERYTHROCYTE [DISTWIDTH] IN BLOOD BY AUTOMATED COUNT: 19.4 % (ref 11.6–15.1)
GFR SERPL CREATININE-BSD FRML MDRD: 45.7 ML/MIN/1.73SQ M
GLUCOSE SERPL-MCNC: 95 MG/DL (ref 65–140)
HCT VFR BLD AUTO: 25.6 % (ref 36.5–49.3)
HGB BLD-MCNC: 8.8 G/DL (ref 12–17)
LYMPHOCYTES # BLD AUTO: 32.73 THOUSAND/UL (ref 0.6–4.47)
LYMPHOCYTES # BLD AUTO: 68 % (ref 14–44)
MCH RBC QN AUTO: 33 PG (ref 26.8–34.3)
MCHC RBC AUTO-ENTMCNC: 34.4 G/DL (ref 31.4–37.4)
MCV RBC AUTO: 96 FL (ref 82–98)
MONOCYTES # BLD AUTO: 3.85 THOUSAND/UL (ref 0–1.22)
MONOCYTES NFR BLD: 8 % (ref 4–12)
NEUTROPHILS # BLD MANUAL: 2.89 THOUSAND/UL (ref 1.85–7.62)
NEUTS SEG NFR BLD AUTO: 6 % (ref 43–75)
NRBC BLD AUTO-RTO: 0 /100 WBCS
PLATELET # BLD AUTO: 35 THOUSANDS/UL (ref 149–390)
PLATELET BLD QL SMEAR: ABNORMAL
PMV BLD AUTO: 11.5 FL (ref 8.9–12.7)
POLYCHROMASIA BLD QL SMEAR: PRESENT
POTASSIUM SERPL-SCNC: 4 MMOL/L (ref 3.5–5.3)
RBC # BLD AUTO: 2.67 MILLION/UL (ref 3.88–5.62)
SODIUM SERPL-SCNC: 142 MMOL/L (ref 136–145)
TOTAL CELLS COUNTED SPEC: 100
UNIT DISPENSE STATUS: NORMAL
UNIT DISPENSE STATUS: NORMAL
UNIT PRODUCT CODE: NORMAL
UNIT PRODUCT CODE: NORMAL
UNIT RH: NORMAL
UNIT RH: NORMAL
VARIANT LYMPHS # BLD AUTO: 18 %
WBC # BLD AUTO: 48.13 THOUSAND/UL (ref 4.31–10.16)

## 2017-03-08 PROCEDURE — 80048 BASIC METABOLIC PNL TOTAL CA: CPT | Performed by: INTERNAL MEDICINE

## 2017-03-08 PROCEDURE — 85007 BL SMEAR W/DIFF WBC COUNT: CPT | Performed by: INTERNAL MEDICINE

## 2017-03-08 PROCEDURE — 85027 COMPLETE CBC AUTOMATED: CPT | Performed by: INTERNAL MEDICINE

## 2017-03-08 RX ORDER — SUCRALFATE ORAL 1 G/10ML
1000 SUSPENSION ORAL
Qty: 420 ML | Refills: 0 | Status: SHIPPED | OUTPATIENT
Start: 2017-03-08 | End: 2017-04-19 | Stop reason: ALTCHOICE

## 2017-03-08 RX ORDER — GUAIFENESIN 600 MG
600 TABLET, EXTENDED RELEASE 12 HR ORAL 2 TIMES DAILY
Qty: 60 TABLET | Refills: 0 | Status: SHIPPED | OUTPATIENT
Start: 2017-03-08 | End: 2017-04-07

## 2017-03-08 RX ORDER — ALBUTEROL SULFATE 90 UG/1
2 AEROSOL, METERED RESPIRATORY (INHALATION) EVERY 4 HOURS PRN
Qty: 1 INHALER | Refills: 0 | Status: SHIPPED | OUTPATIENT
Start: 2017-03-08 | End: 2017-04-07

## 2017-03-08 RX ADMIN — PREDNISONE 15 MG: 5 TABLET ORAL at 08:05

## 2017-03-08 RX ADMIN — CITALOPRAM HYDROBROMIDE 20 MG: 20 TABLET ORAL at 08:05

## 2017-03-08 RX ADMIN — FAMOTIDINE 20 MG: 20 TABLET ORAL at 08:06

## 2017-03-08 RX ADMIN — PANTOPRAZOLE SODIUM 40 MG: 40 TABLET, DELAYED RELEASE ORAL at 06:23

## 2017-03-08 RX ADMIN — SUCRALFATE 1000 MG: 1 SUSPENSION ORAL at 06:23

## 2017-03-08 RX ADMIN — GABAPENTIN 100 MG: 100 CAPSULE ORAL at 08:06

## 2017-03-08 RX ADMIN — FOLIC ACID 1 MG: 1 TABLET ORAL at 08:06

## 2017-03-08 RX ADMIN — GUAIFENESIN 600 MG: 600 TABLET, EXTENDED RELEASE ORAL at 08:06

## 2017-03-08 RX ADMIN — RANOLAZINE 500 MG: 500 TABLET, FILM COATED, EXTENDED RELEASE ORAL at 08:06

## 2017-03-08 RX ADMIN — METOPROLOL TARTRATE 50 MG: 50 TABLET ORAL at 08:06

## 2017-03-09 ENCOUNTER — GENERIC CONVERSION - ENCOUNTER (OUTPATIENT)
Dept: OTHER | Facility: OTHER | Age: 62
End: 2017-03-09

## 2017-03-09 ENCOUNTER — HOSPITAL ENCOUNTER (INPATIENT)
Facility: HOSPITAL | Age: 62
LOS: 1 days | Discharge: HOME | DRG: 813 | End: 2017-03-10
Attending: INTERNAL MEDICINE | Admitting: INTERNAL MEDICINE
Payer: MEDICARE

## 2017-03-09 DIAGNOSIS — D64.9 SYMPTOMATIC ANEMIA: ICD-10-CM

## 2017-03-09 DIAGNOSIS — C91.10 CLL (CHRONIC LYMPHOCYTIC LEUKEMIA) (HCC): ICD-10-CM

## 2017-03-09 DIAGNOSIS — K92.2 GI BLEED: Primary | ICD-10-CM

## 2017-03-09 LAB
ALBUMIN SERPL BCP-MCNC: 3.8 G/DL (ref 3.5–5)
ALP SERPL-CCNC: 65 U/L (ref 46–116)
ALT SERPL W P-5'-P-CCNC: 18 U/L (ref 12–78)
ANION GAP SERPL CALCULATED.3IONS-SCNC: 13 MMOL/L (ref 4–13)
ANISOCYTOSIS BLD QL SMEAR: PRESENT
AST SERPL W P-5'-P-CCNC: 20 U/L (ref 5–45)
BASOPHILS # BLD MANUAL: 0 THOUSAND/UL (ref 0–0.1)
BASOPHILS NFR MAR MANUAL: 0 % (ref 0–1)
BILIRUB SERPL-MCNC: 1.37 MG/DL (ref 0.2–1)
BUN SERPL-MCNC: 22 MG/DL (ref 5–25)
CALCIUM SERPL-MCNC: 8.7 MG/DL (ref 8.3–10.1)
CHLORIDE SERPL-SCNC: 105 MMOL/L (ref 100–108)
CO2 SERPL-SCNC: 24 MMOL/L (ref 21–32)
CREAT SERPL-MCNC: 1.51 MG/DL (ref 0.6–1.3)
EOSINOPHIL # BLD MANUAL: 0 THOUSAND/UL (ref 0–0.4)
EOSINOPHIL NFR BLD MANUAL: 0 % (ref 0–6)
ERYTHROCYTE [DISTWIDTH] IN BLOOD BY AUTOMATED COUNT: 19.2 % (ref 11.6–15.1)
GFR SERPL CREATININE-BSD FRML MDRD: 47.1 ML/MIN/1.73SQ M
GLUCOSE SERPL-MCNC: 106 MG/DL (ref 65–140)
HCT VFR BLD AUTO: 27.9 % (ref 36.5–49.3)
HGB BLD-MCNC: 9.3 G/DL (ref 12–17)
LYMPHOCYTES # BLD AUTO: 52.85 THOUSAND/UL (ref 0.6–4.47)
LYMPHOCYTES # BLD AUTO: 94 % (ref 14–44)
MCH RBC QN AUTO: 32.4 PG (ref 26.8–34.3)
MCHC RBC AUTO-ENTMCNC: 33.3 G/DL (ref 31.4–37.4)
MCV RBC AUTO: 97 FL (ref 82–98)
MONOCYTES # BLD AUTO: 0 THOUSAND/UL (ref 0–1.22)
MONOCYTES NFR BLD: 0 % (ref 4–12)
NEUTROPHILS # BLD MANUAL: 3.37 THOUSAND/UL (ref 1.85–7.62)
NEUTS SEG NFR BLD AUTO: 6 % (ref 43–75)
NRBC BLD AUTO-RTO: 0 /100 WBCS
PLATELET # BLD AUTO: 41 THOUSANDS/UL (ref 149–390)
PLATELET BLD QL SMEAR: ABNORMAL
PMV BLD AUTO: 10.9 FL (ref 8.9–12.7)
POTASSIUM SERPL-SCNC: 3.6 MMOL/L (ref 3.5–5.3)
PROT SERPL-MCNC: 6.1 G/DL (ref 6.4–8.2)
RBC # BLD AUTO: 2.87 MILLION/UL (ref 3.88–5.62)
SODIUM SERPL-SCNC: 142 MMOL/L (ref 136–145)
TOTAL CELLS COUNTED SPEC: 100
WBC # BLD AUTO: 56.22 THOUSAND/UL (ref 4.31–10.16)

## 2017-03-09 PROCEDURE — 85007 BL SMEAR W/DIFF WBC COUNT: CPT | Performed by: INTERNAL MEDICINE

## 2017-03-09 PROCEDURE — 85027 COMPLETE CBC AUTOMATED: CPT | Performed by: INTERNAL MEDICINE

## 2017-03-09 PROCEDURE — 80053 COMPREHEN METABOLIC PANEL: CPT | Performed by: INTERNAL MEDICINE

## 2017-03-09 RX ORDER — ALBUTEROL SULFATE 2.5 MG/3ML
2.5 SOLUTION RESPIRATORY (INHALATION) EVERY 6 HOURS PRN
Status: DISCONTINUED | OUTPATIENT
Start: 2017-03-09 | End: 2017-03-10 | Stop reason: HOSPADM

## 2017-03-09 RX ORDER — SUCRALFATE ORAL 1 G/10ML
SUSPENSION ORAL
Status: DISPENSED
Start: 2017-03-09 | End: 2017-03-10

## 2017-03-09 RX ORDER — GUAIFENESIN 600 MG
TABLET, EXTENDED RELEASE 12 HR ORAL
Status: DISPENSED
Start: 2017-03-09 | End: 2017-03-10

## 2017-03-09 RX ORDER — LORAZEPAM 1 MG/1
TABLET ORAL
Status: DISPENSED
Start: 2017-03-09 | End: 2017-03-10

## 2017-03-09 RX ORDER — GUAIFENESIN 600 MG
600 TABLET, EXTENDED RELEASE 12 HR ORAL EVERY 12 HOURS
Status: DISCONTINUED | OUTPATIENT
Start: 2017-03-09 | End: 2017-03-10 | Stop reason: HOSPADM

## 2017-03-09 RX ORDER — FENOFIBRATE 145 MG/1
145 TABLET, COATED ORAL DAILY
Status: DISCONTINUED | OUTPATIENT
Start: 2017-03-09 | End: 2017-03-10 | Stop reason: HOSPADM

## 2017-03-09 RX ORDER — PRAVASTATIN SODIUM 20 MG
10 TABLET ORAL
Status: DISCONTINUED | OUTPATIENT
Start: 2017-03-09 | End: 2017-03-10 | Stop reason: HOSPADM

## 2017-03-09 RX ORDER — GABAPENTIN 100 MG/1
100 CAPSULE ORAL 2 TIMES DAILY
Status: DISCONTINUED | OUTPATIENT
Start: 2017-03-09 | End: 2017-03-10 | Stop reason: HOSPADM

## 2017-03-09 RX ORDER — LISINOPRIL 20 MG/1
20 TABLET ORAL DAILY
Status: DISCONTINUED | OUTPATIENT
Start: 2017-03-09 | End: 2017-03-10 | Stop reason: HOSPADM

## 2017-03-09 RX ORDER — GABAPENTIN 100 MG/1
CAPSULE ORAL
Status: DISPENSED
Start: 2017-03-09 | End: 2017-03-10

## 2017-03-09 RX ORDER — PREDNISONE 1 MG/1
5 TABLET ORAL DAILY
Status: DISCONTINUED | OUTPATIENT
Start: 2017-03-09 | End: 2017-03-10

## 2017-03-09 RX ORDER — SUCRALFATE ORAL 1 G/10ML
1000 SUSPENSION ORAL
Status: DISCONTINUED | OUTPATIENT
Start: 2017-03-09 | End: 2017-03-10 | Stop reason: HOSPADM

## 2017-03-09 RX ORDER — SUCRALFATE ORAL 1 G/10ML
1000 SUSPENSION ORAL
Status: DISCONTINUED | OUTPATIENT
Start: 2017-03-09 | End: 2017-03-09

## 2017-03-09 RX ORDER — PANTOPRAZOLE SODIUM 40 MG/1
40 INJECTION, POWDER, FOR SOLUTION INTRAVENOUS EVERY 12 HOURS
Status: DISCONTINUED | OUTPATIENT
Start: 2017-03-09 | End: 2017-03-10 | Stop reason: HOSPADM

## 2017-03-09 RX ORDER — RANOLAZINE 500 MG/1
500 TABLET, EXTENDED RELEASE ORAL DAILY
Status: DISCONTINUED | OUTPATIENT
Start: 2017-03-09 | End: 2017-03-10 | Stop reason: HOSPADM

## 2017-03-09 RX ORDER — LORAZEPAM 0.5 MG/1
0.5 TABLET ORAL 3 TIMES DAILY
Status: DISCONTINUED | OUTPATIENT
Start: 2017-03-09 | End: 2017-03-10 | Stop reason: HOSPADM

## 2017-03-09 RX ORDER — METOPROLOL TARTRATE 50 MG/1
50 TABLET, FILM COATED ORAL DAILY
Status: DISCONTINUED | OUTPATIENT
Start: 2017-03-09 | End: 2017-03-10 | Stop reason: HOSPADM

## 2017-03-09 RX ORDER — ACETAMINOPHEN 325 MG/1
650 TABLET ORAL EVERY 6 HOURS PRN
Status: DISCONTINUED | OUTPATIENT
Start: 2017-03-09 | End: 2017-03-10 | Stop reason: HOSPADM

## 2017-03-09 RX ORDER — FOLIC ACID 1 MG/1
1 TABLET ORAL DAILY
Status: DISCONTINUED | OUTPATIENT
Start: 2017-03-09 | End: 2017-03-10 | Stop reason: HOSPADM

## 2017-03-09 RX ORDER — CITALOPRAM 20 MG/1
20 TABLET ORAL DAILY
Status: DISCONTINUED | OUTPATIENT
Start: 2017-03-09 | End: 2017-03-10 | Stop reason: HOSPADM

## 2017-03-09 RX ADMIN — SUCRALFATE 1000 MG: 1 SUSPENSION ORAL at 15:31

## 2017-03-09 RX ADMIN — FENOFIBRATE 145 MG: 145 TABLET ORAL at 14:28

## 2017-03-09 RX ADMIN — PREDNISONE 5 MG: 5 TABLET ORAL at 14:23

## 2017-03-09 RX ADMIN — FOLIC ACID 1 MG: 1 TABLET ORAL at 14:28

## 2017-03-09 RX ADMIN — LORAZEPAM 0.5 MG: 0.5 TABLET ORAL at 15:31

## 2017-03-09 RX ADMIN — GABAPENTIN 100 MG: 100 CAPSULE ORAL at 14:28

## 2017-03-09 RX ADMIN — RANOLAZINE 500 MG: 500 TABLET, FILM COATED, EXTENDED RELEASE ORAL at 14:29

## 2017-03-09 RX ADMIN — CITALOPRAM HYDROBROMIDE 20 MG: 20 TABLET ORAL at 14:28

## 2017-03-09 RX ADMIN — PRAVASTATIN SODIUM 10 MG: 20 TABLET ORAL at 15:35

## 2017-03-10 VITALS
WEIGHT: 195.55 LBS | BODY MASS INDEX: 25.92 KG/M2 | DIASTOLIC BLOOD PRESSURE: 75 MMHG | TEMPERATURE: 98.3 F | HEART RATE: 83 BPM | OXYGEN SATURATION: 97 % | SYSTOLIC BLOOD PRESSURE: 108 MMHG | RESPIRATION RATE: 18 BRPM | HEIGHT: 73 IN

## 2017-03-10 PROBLEM — K92.2 GI BLEED: Status: ACTIVE | Noted: 2017-03-10

## 2017-03-10 PROBLEM — D63.8 ANEMIA, CHRONIC DISEASE: Status: ACTIVE | Noted: 2017-03-10

## 2017-03-10 LAB
ABO GROUP BLD BPU: NORMAL
ALBUMIN SERPL BCP-MCNC: 3.5 G/DL (ref 3.5–5)
ALP SERPL-CCNC: 56 U/L (ref 46–116)
ALT SERPL W P-5'-P-CCNC: 14 U/L (ref 12–78)
ANION GAP SERPL CALCULATED.3IONS-SCNC: 10 MMOL/L (ref 4–13)
ANISOCYTOSIS BLD QL SMEAR: PRESENT
AST SERPL W P-5'-P-CCNC: 22 U/L (ref 5–45)
BASOPHILS # BLD MANUAL: 0 THOUSAND/UL (ref 0–0.1)
BASOPHILS NFR MAR MANUAL: 0 % (ref 0–1)
BILIRUB SERPL-MCNC: 1.77 MG/DL (ref 0.2–1)
BPU ID: NORMAL
BUN SERPL-MCNC: 22 MG/DL (ref 5–25)
CALCIUM SERPL-MCNC: 8.3 MG/DL (ref 8.3–10.1)
CHLORIDE SERPL-SCNC: 106 MMOL/L (ref 100–108)
CO2 SERPL-SCNC: 25 MMOL/L (ref 21–32)
CREAT SERPL-MCNC: 1.46 MG/DL (ref 0.6–1.3)
CROSSMATCH: NORMAL
EOSINOPHIL # BLD MANUAL: 0.45 THOUSAND/UL (ref 0–0.4)
EOSINOPHIL NFR BLD MANUAL: 1 % (ref 0–6)
ERYTHROCYTE [DISTWIDTH] IN BLOOD BY AUTOMATED COUNT: 19.3 % (ref 11.6–15.1)
GFR SERPL CREATININE-BSD FRML MDRD: 48.9 ML/MIN/1.73SQ M
GLUCOSE SERPL-MCNC: 81 MG/DL (ref 65–140)
HCT VFR BLD AUTO: 23.8 % (ref 36.5–49.3)
HEMOCCULT STL QL: NEGATIVE
HGB BLD-MCNC: 8 G/DL (ref 12–17)
LG PLATELETS BLD QL SMEAR: PRESENT
LYMPHOCYTES # BLD AUTO: 42.86 THOUSAND/UL (ref 0.6–4.47)
LYMPHOCYTES # BLD AUTO: 96 % (ref 14–44)
MCH RBC QN AUTO: 33.1 PG (ref 26.8–34.3)
MCHC RBC AUTO-ENTMCNC: 33.6 G/DL (ref 31.4–37.4)
MCV RBC AUTO: 98 FL (ref 82–98)
MONOCYTES # BLD AUTO: 0 THOUSAND/UL (ref 0–1.22)
MONOCYTES NFR BLD: 0 % (ref 4–12)
NEUTROPHILS # BLD MANUAL: 0.45 THOUSAND/UL (ref 1.85–7.62)
NEUTS BAND NFR BLD MANUAL: 1 % (ref 0–8)
NEUTS SEG NFR BLD AUTO: 0 % (ref 43–75)
NRBC BLD AUTO-RTO: 0 /100 WBCS
PLATELET # BLD AUTO: 32 THOUSANDS/UL (ref 149–390)
PLATELET BLD QL SMEAR: ABNORMAL
PMV BLD AUTO: 10.4 FL (ref 8.9–12.7)
POTASSIUM SERPL-SCNC: 3.8 MMOL/L (ref 3.5–5.3)
PROT SERPL-MCNC: 5.5 G/DL (ref 6.4–8.2)
RBC # BLD AUTO: 2.42 MILLION/UL (ref 3.88–5.62)
SODIUM SERPL-SCNC: 141 MMOL/L (ref 136–145)
TOTAL CELLS COUNTED SPEC: 100
UNIT DISPENSE STATUS: NORMAL
UNIT PRODUCT CODE: NORMAL
UNIT RH: NORMAL
VARIANT LYMPHS # BLD AUTO: 2 %
WBC # BLD AUTO: 44.65 THOUSAND/UL (ref 4.31–10.16)

## 2017-03-10 PROCEDURE — P9040 RBC LEUKOREDUCED IRRADIATED: HCPCS

## 2017-03-10 PROCEDURE — 80053 COMPREHEN METABOLIC PANEL: CPT | Performed by: INTERNAL MEDICINE

## 2017-03-10 PROCEDURE — 85027 COMPLETE CBC AUTOMATED: CPT | Performed by: INTERNAL MEDICINE

## 2017-03-10 PROCEDURE — 85007 BL SMEAR W/DIFF WBC COUNT: CPT | Performed by: INTERNAL MEDICINE

## 2017-03-10 PROCEDURE — 30243N1 TRANSFUSION OF NONAUTOLOGOUS RED BLOOD CELLS INTO CENTRAL VEIN, PERCUTANEOUS APPROACH: ICD-10-PCS | Performed by: INTERNAL MEDICINE

## 2017-03-10 PROCEDURE — 82272 OCCULT BLD FECES 1-3 TESTS: CPT | Performed by: INTERNAL MEDICINE

## 2017-03-10 PROCEDURE — 86922 COMPATIBILITY TEST ANTIGLOB: CPT

## 2017-03-10 PROCEDURE — 86921 COMPATIBILITY TEST INCUBATE: CPT

## 2017-03-10 RX ORDER — PREDNISONE 20 MG/1
40 TABLET ORAL DAILY
Qty: 15 TABLET | Refills: 0 | Status: SHIPPED | OUTPATIENT
Start: 2017-03-11 | End: 2017-03-20 | Stop reason: HOSPADM

## 2017-03-10 RX ORDER — SUCRALFATE ORAL 1 G/10ML
SUSPENSION ORAL
Status: COMPLETED
Start: 2017-03-10 | End: 2017-03-10

## 2017-03-10 RX ORDER — PREDNISONE 20 MG/1
40 TABLET ORAL DAILY
Status: DISCONTINUED | OUTPATIENT
Start: 2017-03-11 | End: 2017-03-10 | Stop reason: HOSPADM

## 2017-03-10 RX ORDER — LORAZEPAM 0.5 MG/1
0.5 TABLET ORAL EVERY 8 HOURS PRN
Qty: 30 TABLET | Refills: 0 | Status: SHIPPED | OUTPATIENT
Start: 2017-03-10 | End: 2018-03-05 | Stop reason: ALTCHOICE

## 2017-03-10 RX ADMIN — GABAPENTIN 100 MG: 100 CAPSULE ORAL at 08:37

## 2017-03-10 RX ADMIN — SUCRALFATE 1000 MG: 1 SUSPENSION ORAL at 06:01

## 2017-03-10 RX ADMIN — RANOLAZINE 500 MG: 500 TABLET, FILM COATED, EXTENDED RELEASE ORAL at 08:36

## 2017-03-10 RX ADMIN — SUCRALFATE 1000 MG: 1 SUSPENSION ORAL at 12:49

## 2017-03-10 RX ADMIN — METOPROLOL TARTRATE 50 MG: 50 TABLET ORAL at 08:38

## 2017-03-10 RX ADMIN — LORAZEPAM 0.5 MG: 0.5 TABLET ORAL at 17:00

## 2017-03-10 RX ADMIN — GUAIFENESIN 600 MG: 600 TABLET, EXTENDED RELEASE ORAL at 08:38

## 2017-03-10 RX ADMIN — SUCRALFATE 1000 MG: 1 SUSPENSION ORAL at 17:00

## 2017-03-10 RX ADMIN — CITALOPRAM HYDROBROMIDE 20 MG: 20 TABLET ORAL at 08:37

## 2017-03-10 RX ADMIN — LISINOPRIL 20 MG: 20 TABLET ORAL at 08:38

## 2017-03-10 RX ADMIN — FENOFIBRATE 145 MG: 145 TABLET ORAL at 08:36

## 2017-03-10 RX ADMIN — PRAVASTATIN SODIUM 10 MG: 20 TABLET ORAL at 17:03

## 2017-03-10 RX ADMIN — FOLIC ACID 1 MG: 1 TABLET ORAL at 08:37

## 2017-03-10 RX ADMIN — GABAPENTIN 100 MG: 100 CAPSULE ORAL at 17:03

## 2017-03-10 RX ADMIN — LORAZEPAM 0.5 MG: 0.5 TABLET ORAL at 08:38

## 2017-03-10 RX ADMIN — PREDNISONE 5 MG: 5 TABLET ORAL at 08:36

## 2017-03-11 LAB
ABO GROUP BLD BPU: NORMAL
BPU ID: NORMAL
CROSSMATCH: NORMAL
UNIT DISPENSE STATUS: NORMAL
UNIT PRODUCT CODE: NORMAL
UNIT RH: NORMAL

## 2017-03-13 ENCOUNTER — HOSPITAL ENCOUNTER (OUTPATIENT)
Dept: INFUSION CENTER | Facility: CLINIC | Age: 62
Discharge: HOME | End: 2017-03-13
Payer: MEDICARE

## 2017-03-13 LAB
ALBUMIN SERPL BCP-MCNC: 3.9 G/DL (ref 3.5–5)
ALP SERPL-CCNC: 62 U/L (ref 46–116)
ALT SERPL W P-5'-P-CCNC: <6 U/L (ref 12–78)
ANION GAP SERPL CALCULATED.3IONS-SCNC: 9 MMOL/L (ref 4–13)
ANISOCYTOSIS BLD QL SMEAR: PRESENT
AST SERPL W P-5'-P-CCNC: 5 U/L (ref 5–45)
BASOPHILS # BLD AUTO: 0 THOUSAND/UL (ref 0–0.1)
BASOPHILS NFR MAR MANUAL: 0 % (ref 0–1)
BILIRUB DIRECT SERPL-MCNC: 0.36 MG/DL (ref 0–0.2)
BILIRUB SERPL-MCNC: 2 MG/DL (ref 0.2–1)
BUN SERPL-MCNC: 26 MG/DL (ref 5–25)
CALCIUM SERPL-MCNC: 9.2 MG/DL (ref 8.3–10.1)
CHLORIDE SERPL-SCNC: 108 MMOL/L (ref 100–108)
CO2 SERPL-SCNC: 22 MMOL/L (ref 21–32)
CREAT SERPL-MCNC: 1.37 MG/DL (ref 0.6–1.3)
DAT IGG-SP REAG RBCCO QL: NORMAL
DAT POLY-SP REAG RBC QL: NORMAL
EOSINOPHIL # BLD AUTO: 0 THOUSAND/UL (ref 0–0.61)
EOSINOPHIL NFR BLD MANUAL: 0 % (ref 0–6)
ERYTHROCYTE [DISTWIDTH] IN BLOOD BY AUTOMATED COUNT: 19.9 % (ref 11.6–15.1)
GFR SERPL CREATININE-BSD FRML MDRD: 52.7 ML/MIN/1.73SQ M
GLUCOSE SERPL-MCNC: 108 MG/DL (ref 65–140)
HCT VFR BLD AUTO: 24.4 % (ref 36.5–49.3)
HGB BLD-MCNC: 8.1 G/DL (ref 12–17)
IGG SERPL-MCNC: 50 MG/DL (ref 700–1600)
LDH SERPL-CCNC: 337 U/L (ref 81–234)
LG PLATELETS BLD QL SMEAR: PRESENT
LYMPHOCYTES # BLD AUTO: 81.45 THOUSAND/UL (ref 0.6–4.47)
LYMPHOCYTES # BLD AUTO: 91 % (ref 14–44)
MCH RBC QN AUTO: 32.9 PG (ref 26.8–34.3)
MCHC RBC AUTO-ENTMCNC: 33 G/DL (ref 31.4–37.4)
MCV RBC AUTO: 100 FL (ref 82–98)
MONOCYTES # BLD AUTO: 2.69 THOUSAND/UL (ref 0–1.22)
MONOCYTES NFR BLD AUTO: 3 % (ref 4–12)
NEUTS BAND NFR BLD MANUAL: 0 % (ref 0–8)
NEUTS SEG # BLD: 5.37 THOUSAND/UL (ref 1.81–6.82)
NEUTS SEG NFR BLD AUTO: 6 % (ref 43–75)
PLATELET # BLD AUTO: 56 THOUSANDS/UL (ref 149–390)
PLATELET BLD QL SMEAR: ABNORMAL
PMV BLD AUTO: 7.5 FL (ref 8.9–12.7)
POTASSIUM SERPL-SCNC: 4.1 MMOL/L (ref 3.5–5.3)
PROT SERPL-MCNC: 5.4 G/DL (ref 6.4–8.2)
RBC # BLD AUTO: 2.45 MILLION/UL (ref 3.88–5.62)
RETICS # AUTO: ABNORMAL 10*3/UL (ref 14356–105094)
RETICS # CALC: 2.86 % (ref 0.37–1.87)
SODIUM SERPL-SCNC: 139 MMOL/L (ref 136–145)
TOTAL CELLS COUNTED SPEC: 100
URATE SERPL-MCNC: 4.8 MG/DL (ref 4.2–8)
WBC # BLD AUTO: 89.5 THOUSAND/UL (ref 4.31–10.16)
WBC NRBC COR # BLD: 89.5 THOUSAND/UL (ref 4.31–10.16)

## 2017-03-13 PROCEDURE — 85045 AUTOMATED RETICULOCYTE COUNT: CPT | Performed by: INTERNAL MEDICINE

## 2017-03-13 PROCEDURE — 82784 ASSAY IGA/IGD/IGG/IGM EACH: CPT | Performed by: INTERNAL MEDICINE

## 2017-03-13 PROCEDURE — 82248 BILIRUBIN DIRECT: CPT | Performed by: INTERNAL MEDICINE

## 2017-03-13 PROCEDURE — 83615 LACTATE (LD) (LDH) ENZYME: CPT | Performed by: INTERNAL MEDICINE

## 2017-03-13 PROCEDURE — 82232 ASSAY OF BETA-2 PROTEIN: CPT | Performed by: INTERNAL MEDICINE

## 2017-03-13 PROCEDURE — 85027 COMPLETE CBC AUTOMATED: CPT | Performed by: INTERNAL MEDICINE

## 2017-03-13 PROCEDURE — 85007 BL SMEAR W/DIFF WBC COUNT: CPT | Performed by: INTERNAL MEDICINE

## 2017-03-13 PROCEDURE — 86880 COOMBS TEST DIRECT: CPT | Performed by: INTERNAL MEDICINE

## 2017-03-13 PROCEDURE — 84550 ASSAY OF BLOOD/URIC ACID: CPT | Performed by: INTERNAL MEDICINE

## 2017-03-13 PROCEDURE — 80053 COMPREHEN METABOLIC PANEL: CPT | Performed by: INTERNAL MEDICINE

## 2017-03-15 ENCOUNTER — LAB REQUISITION (OUTPATIENT)
Dept: LAB | Facility: HOSPITAL | Age: 62
End: 2017-03-15
Payer: MEDICARE

## 2017-03-15 ENCOUNTER — ALLSCRIPTS OFFICE VISIT (OUTPATIENT)
Dept: OTHER | Facility: OTHER | Age: 62
End: 2017-03-15

## 2017-03-15 ENCOUNTER — HOSPITAL ENCOUNTER (OUTPATIENT)
Dept: INFUSION CENTER | Facility: CLINIC | Age: 62
Discharge: HOME | End: 2017-03-15
Payer: MEDICARE

## 2017-03-15 DIAGNOSIS — C91.10 CHRONIC LYMPHOCYTIC LEUKEMIA OF B-CELL TYPE NOT HAVING ACHIEVED REMISSION (HCC): ICD-10-CM

## 2017-03-15 LAB
ABO GROUP BLD: NORMAL
ANISOCYTOSIS BLD QL SMEAR: PRESENT
B2 MICROGLOB SERPL-MCNC: 4.6 MG/L (ref 0.6–2.4)
BASOPHILS # BLD AUTO: 0 THOUSAND/UL (ref 0–0.1)
BASOPHILS NFR MAR MANUAL: 0 % (ref 0–1)
BLD GP AB SCN SERPL QL: POSITIVE
BLOOD GROUP ANTIBODIES SERPL: NORMAL
CREAT SERPL-MCNC: 1.39 MG/DL (ref 0.6–1.3)
EOSINOPHIL # BLD AUTO: 0 THOUSAND/UL (ref 0–0.61)
EOSINOPHIL NFR BLD MANUAL: 0 % (ref 0–6)
ERYTHROCYTE [DISTWIDTH] IN BLOOD BY AUTOMATED COUNT: 20.7 % (ref 11.6–15.1)
GFR SERPL CREATININE-BSD FRML MDRD: 51.8 ML/MIN/1.73SQ M
HCT VFR BLD AUTO: 24.8 % (ref 36.5–49.3)
HGB BLD-MCNC: 7.7 G/DL (ref 12–17)
LYMPHOCYTES # BLD AUTO: 194.28 THOUSAND/UL (ref 0.6–4.47)
LYMPHOCYTES # BLD AUTO: 95 % (ref 14–44)
MACROCYTES BLD QL AUTO: PRESENT
MCH RBC QN AUTO: 32.7 PG (ref 26.8–34.3)
MCHC RBC AUTO-ENTMCNC: 31.2 G/DL (ref 31.4–37.4)
MCV RBC AUTO: 105 FL (ref 82–98)
MONOCYTES # BLD AUTO: 2.05 THOUSAND/UL (ref 0–1.22)
MONOCYTES NFR BLD AUTO: 1 % (ref 4–12)
NEUTS BAND NFR BLD MANUAL: 0 % (ref 0–8)
NEUTS SEG # BLD: 8.18 THOUSAND/UL (ref 1.81–6.82)
NEUTS SEG NFR BLD AUTO: 4 % (ref 43–75)
PLATELET # BLD AUTO: 73 THOUSANDS/UL (ref 149–390)
PLATELET BLD QL SMEAR: ABNORMAL
PMV BLD AUTO: 7.3 FL (ref 8.9–12.7)
POLYCHROMASIA BLD QL SMEAR: PRESENT
RBC # BLD AUTO: 2.36 MILLION/UL (ref 3.88–5.62)
RETICS # AUTO: ABNORMAL 10*3/UL (ref 14356–105094)
RETICS # CALC: 4.09 % (ref 0.37–1.87)
RH BLD: POSITIVE
TOTAL CELLS COUNTED SPEC: 100
WBC # BLD AUTO: 204.5 THOUSAND/UL (ref 4.31–10.16)
WBC NRBC COR # BLD: 204.5 THOUSAND/UL (ref 4.31–10.16)

## 2017-03-15 PROCEDURE — 86901 BLOOD TYPING SEROLOGIC RH(D): CPT | Performed by: INTERNAL MEDICINE

## 2017-03-15 PROCEDURE — 88342 IMHCHEM/IMCYTCHM 1ST ANTB: CPT | Performed by: INTERNAL MEDICINE

## 2017-03-15 PROCEDURE — 88365 INSITU HYBRIDIZATION (FISH): CPT | Performed by: INTERNAL MEDICINE

## 2017-03-15 PROCEDURE — 86900 BLOOD TYPING SEROLOGIC ABO: CPT | Performed by: INTERNAL MEDICINE

## 2017-03-15 PROCEDURE — 82565 ASSAY OF CREATININE: CPT | Performed by: INTERNAL MEDICINE

## 2017-03-15 PROCEDURE — 88341 IMHCHEM/IMCYTCHM EA ADD ANTB: CPT | Performed by: INTERNAL MEDICINE

## 2017-03-15 PROCEDURE — 86870 RBC ANTIBODY IDENTIFICATION: CPT | Performed by: INTERNAL MEDICINE

## 2017-03-15 PROCEDURE — 86921 COMPATIBILITY TEST INCUBATE: CPT

## 2017-03-15 PROCEDURE — 85007 BL SMEAR W/DIFF WBC COUNT: CPT | Performed by: INTERNAL MEDICINE

## 2017-03-15 PROCEDURE — 88305 TISSUE EXAM BY PATHOLOGIST: CPT | Performed by: INTERNAL MEDICINE

## 2017-03-15 PROCEDURE — 86850 RBC ANTIBODY SCREEN: CPT | Performed by: INTERNAL MEDICINE

## 2017-03-15 PROCEDURE — 85045 AUTOMATED RETICULOCYTE COUNT: CPT | Performed by: INTERNAL MEDICINE

## 2017-03-15 PROCEDURE — 88313 SPECIAL STAINS GROUP 2: CPT | Performed by: INTERNAL MEDICINE

## 2017-03-15 PROCEDURE — 85027 COMPLETE CBC AUTOMATED: CPT | Performed by: INTERNAL MEDICINE

## 2017-03-15 PROCEDURE — 88311 DECALCIFY TISSUE: CPT | Performed by: INTERNAL MEDICINE

## 2017-03-15 PROCEDURE — 86922 COMPATIBILITY TEST ANTIGLOB: CPT

## 2017-03-15 PROCEDURE — 88184 FLOWCYTOMETRY/ TC 1 MARKER: CPT | Performed by: INTERNAL MEDICINE

## 2017-03-15 PROCEDURE — 88185 FLOWCYTOMETRY/TC ADD-ON: CPT | Performed by: INTERNAL MEDICINE

## 2017-03-15 NOTE — PROGRESS NOTES
Blood work obtained via port a cath  Port flushed per protocol  Pt is aware of next appointment   Refused AVS

## 2017-03-16 RX ORDER — SODIUM CHLORIDE 9 MG/ML
20 INJECTION, SOLUTION INTRAVENOUS CONTINUOUS
Status: DISCONTINUED | OUTPATIENT
Start: 2017-03-17 | End: 2017-03-20 | Stop reason: HOSPADM

## 2017-03-17 ENCOUNTER — HOSPITAL ENCOUNTER (OUTPATIENT)
Dept: INFUSION CENTER | Facility: CLINIC | Age: 62
Discharge: HOME | DRG: 841 | End: 2017-03-17
Payer: MEDICARE

## 2017-03-17 ENCOUNTER — GENERIC CONVERSION - ENCOUNTER (OUTPATIENT)
Dept: OTHER | Facility: OTHER | Age: 62
End: 2017-03-17

## 2017-03-17 VITALS
HEART RATE: 84 BPM | RESPIRATION RATE: 16 BRPM | HEIGHT: 73 IN | SYSTOLIC BLOOD PRESSURE: 117 MMHG | DIASTOLIC BLOOD PRESSURE: 61 MMHG | WEIGHT: 197.53 LBS | TEMPERATURE: 98.4 F | BODY MASS INDEX: 26.18 KG/M2

## 2017-03-17 LAB — SCAN RESULT: NORMAL

## 2017-03-17 PROCEDURE — 96367 TX/PROPH/DG ADDL SEQ IV INF: CPT

## 2017-03-17 PROCEDURE — 96375 TX/PRO/DX INJ NEW DRUG ADDON: CPT

## 2017-03-17 PROCEDURE — 96413 CHEMO IV INFUSION 1 HR: CPT

## 2017-03-17 PROCEDURE — P9040 RBC LEUKOREDUCED IRRADIATED: HCPCS

## 2017-03-17 PROCEDURE — 36430 TRANSFUSION BLD/BLD COMPNT: CPT

## 2017-03-17 RX ORDER — LORATADINE 10 MG/1
10 TABLET ORAL DAILY
COMMUNITY
End: 2017-03-20 | Stop reason: HOSPADM

## 2017-03-17 RX ORDER — ACETAMINOPHEN 325 MG/1
650 TABLET ORAL ONCE
Status: COMPLETED | OUTPATIENT
Start: 2017-03-17 | End: 2017-03-17

## 2017-03-17 RX ORDER — CLOTRIMAZOLE 10 MG/1
10 LOZENGE ORAL; TOPICAL 4 TIMES DAILY
COMMUNITY
End: 2017-03-20 | Stop reason: HOSPADM

## 2017-03-17 RX ORDER — ONDANSETRON 4 MG/1
8 TABLET, ORALLY DISINTEGRATING ORAL EVERY 8 HOURS PRN
COMMUNITY
End: 2017-06-26 | Stop reason: ALTCHOICE

## 2017-03-17 RX ADMIN — ACETAMINOPHEN 650 MG: 325 TABLET, FILM COATED ORAL at 08:39

## 2017-03-17 RX ADMIN — ONDANSETRON 16 MG: 2 INJECTION INTRAMUSCULAR; INTRAVENOUS at 10:55

## 2017-03-17 RX ADMIN — CYCLOPHOSPHAMIDE 1070 MG: 1 INJECTION, POWDER, FOR SOLUTION INTRAVENOUS; ORAL at 11:52

## 2017-03-17 RX ADMIN — SODIUM CHLORIDE 20 ML/HR: 0.9 INJECTION, SOLUTION INTRAVENOUS at 10:55

## 2017-03-17 RX ADMIN — SODIUM CHLORIDE 6 MG: 9 INJECTION, SOLUTION INTRAVENOUS at 11:15

## 2017-03-17 NOTE — PLAN OF CARE
Problem: Potential for Falls  Goal: Patient will remain free of falls  INTERVENTIONS:  - Assess patient frequently for physical needs  - Identify cognitive and physical deficits and behaviors that affect risk of falls    - [place] fall precautions as indicated by assessment   - Educate patient/family on patient safety including physical limitations  - Instruct patient to call for assistance with activity based on assessment  - Modify environment to reduce risk of injury  - Consider OT/PT consult to assist with strengthening/mobility   Outcome: Progressing

## 2017-03-17 NOTE — PROGRESS NOTES
Patient tolerated transfusion of 1 unit of PRBC's and Cytoxan infusion well with no complications  Pt denies any discomfort  He is aware of next appointment   Refused AVS

## 2017-03-20 ENCOUNTER — HOSPITAL ENCOUNTER (INPATIENT)
Facility: HOSPITAL | Age: 62
LOS: 1 days | Discharge: ANOTHER HOSPITAL | DRG: 841 | End: 2017-03-20
Attending: INTERNAL MEDICINE | Admitting: INTERNAL MEDICINE
Payer: MEDICARE

## 2017-03-20 ENCOUNTER — GENERIC CONVERSION - ENCOUNTER (OUTPATIENT)
Dept: OTHER | Facility: OTHER | Age: 62
End: 2017-03-20

## 2017-03-20 ENCOUNTER — HOSPITAL ENCOUNTER (OUTPATIENT)
Dept: INFUSION CENTER | Facility: CLINIC | Age: 62
Discharge: HOME | DRG: 841 | End: 2017-03-20
Payer: MEDICARE

## 2017-03-20 VITALS
HEART RATE: 91 BPM | RESPIRATION RATE: 18 BRPM | DIASTOLIC BLOOD PRESSURE: 56 MMHG | BODY MASS INDEX: 25.73 KG/M2 | HEIGHT: 72 IN | OXYGEN SATURATION: 98 % | TEMPERATURE: 98.4 F | WEIGHT: 190 LBS | SYSTOLIC BLOOD PRESSURE: 107 MMHG

## 2017-03-20 VITALS
DIASTOLIC BLOOD PRESSURE: 60 MMHG | TEMPERATURE: 97.6 F | HEART RATE: 90 BPM | OXYGEN SATURATION: 100 % | SYSTOLIC BLOOD PRESSURE: 116 MMHG | RESPIRATION RATE: 20 BRPM

## 2017-03-20 DIAGNOSIS — K20.80 HERPES SIMPLEX ESOPHAGITIS: ICD-10-CM

## 2017-03-20 DIAGNOSIS — C91.10 CLL (CHRONIC LYMPHOCYTIC LEUKEMIA) (HCC): Primary | ICD-10-CM

## 2017-03-20 DIAGNOSIS — B37.81 CANDIDA ESOPHAGITIS (HCC): ICD-10-CM

## 2017-03-20 DIAGNOSIS — E78.01 FAMILIAL HYPERCHOLESTEROLEMIA: ICD-10-CM

## 2017-03-20 DIAGNOSIS — D75.82 HIT (HEPARIN-INDUCED THROMBOCYTOPENIA) (HCC): ICD-10-CM

## 2017-03-20 DIAGNOSIS — B00.89 HERPES SIMPLEX ESOPHAGITIS: ICD-10-CM

## 2017-03-20 DIAGNOSIS — N17.9 ARF (ACUTE RENAL FAILURE) (HCC): ICD-10-CM

## 2017-03-20 PROBLEM — K92.2 GI BLEED: Status: RESOLVED | Noted: 2017-03-10 | Resolved: 2017-03-20

## 2017-03-20 PROBLEM — D58.9 HEMOLYTIC ANEMIA (HCC): Status: ACTIVE | Noted: 2017-03-06

## 2017-03-20 LAB
ABO GROUP BLD: NORMAL
ANISOCYTOSIS BLD QL SMEAR: PRESENT
BASOPHILS # BLD AUTO: 0 THOUSAND/UL (ref 0–0.1)
BASOPHILS NFR MAR MANUAL: 0 % (ref 0–1)
BLD GP AB SCN SERPL QL: NEGATIVE
CREAT SERPL-MCNC: 1.65 MG/DL (ref 0.6–1.3)
EOSINOPHIL # BLD AUTO: 0 THOUSAND/UL (ref 0–0.61)
EOSINOPHIL NFR BLD MANUAL: 0 % (ref 0–6)
ERYTHROCYTE [DISTWIDTH] IN BLOOD BY AUTOMATED COUNT: 22.3 % (ref 11.6–15.1)
GFR SERPL CREATININE-BSD FRML MDRD: 42.5 ML/MIN/1.73SQ M
HCT VFR BLD AUTO: 21.1 % (ref 36.5–49.3)
HGB BLD-MCNC: 6.2 G/DL (ref 12–17)
LYMPHOCYTES # BLD AUTO: 238.76 THOUSAND/UL (ref 0.6–4.47)
LYMPHOCYTES # BLD AUTO: 94 % (ref 14–44)
MACROCYTES BLD QL AUTO: PRESENT
MCH RBC QN AUTO: 33.4 PG (ref 26.8–34.3)
MCHC RBC AUTO-ENTMCNC: 29.3 G/DL (ref 31.4–37.4)
MCV RBC AUTO: 114 FL (ref 82–98)
MONOCYTES # BLD AUTO: 7.62 THOUSAND/UL (ref 0–1.22)
MONOCYTES NFR BLD AUTO: 3 % (ref 4–12)
NEUTS BAND NFR BLD MANUAL: 0 % (ref 0–8)
NEUTS SEG # BLD: 7.62 THOUSAND/UL (ref 1.81–6.82)
NEUTS SEG NFR BLD AUTO: 3 % (ref 43–75)
PLATELET # BLD AUTO: 88 THOUSANDS/UL (ref 149–390)
PLATELET BLD QL SMEAR: ABNORMAL
PMV BLD AUTO: 8.6 FL (ref 8.9–12.7)
RBC # BLD AUTO: 1.85 MILLION/UL (ref 3.88–5.62)
RETICS # AUTO: ABNORMAL 10*3/UL (ref 14356–105094)
RETICS # CALC: 7.9 % (ref 0.37–1.87)
RH BLD: POSITIVE
TOTAL CELLS COUNTED SPEC: 100
URATE SERPL-MCNC: 4.5 MG/DL (ref 4.2–8)
WBC # BLD AUTO: 254 THOUSAND/UL (ref 4.31–10.16)
WBC NRBC COR # BLD: 254 THOUSAND/UL (ref 4.31–10.16)

## 2017-03-20 PROCEDURE — 84550 ASSAY OF BLOOD/URIC ACID: CPT | Performed by: INTERNAL MEDICINE

## 2017-03-20 PROCEDURE — P9040 RBC LEUKOREDUCED IRRADIATED: HCPCS

## 2017-03-20 PROCEDURE — 86901 BLOOD TYPING SEROLOGIC RH(D): CPT | Performed by: INTERNAL MEDICINE

## 2017-03-20 PROCEDURE — 86850 RBC ANTIBODY SCREEN: CPT | Performed by: INTERNAL MEDICINE

## 2017-03-20 PROCEDURE — 30233N1 TRANSFUSION OF NONAUTOLOGOUS RED BLOOD CELLS INTO PERIPHERAL VEIN, PERCUTANEOUS APPROACH: ICD-10-PCS | Performed by: INTERNAL MEDICINE

## 2017-03-20 PROCEDURE — 85045 AUTOMATED RETICULOCYTE COUNT: CPT | Performed by: INTERNAL MEDICINE

## 2017-03-20 PROCEDURE — 82565 ASSAY OF CREATININE: CPT | Performed by: INTERNAL MEDICINE

## 2017-03-20 PROCEDURE — 85027 COMPLETE CBC AUTOMATED: CPT | Performed by: INTERNAL MEDICINE

## 2017-03-20 PROCEDURE — 86921 COMPATIBILITY TEST INCUBATE: CPT

## 2017-03-20 PROCEDURE — 93005 ELECTROCARDIOGRAM TRACING: CPT | Performed by: INTERNAL MEDICINE

## 2017-03-20 PROCEDURE — 86900 BLOOD TYPING SEROLOGIC ABO: CPT | Performed by: INTERNAL MEDICINE

## 2017-03-20 PROCEDURE — 86922 COMPATIBILITY TEST ANTIGLOB: CPT

## 2017-03-20 PROCEDURE — 85007 BL SMEAR W/DIFF WBC COUNT: CPT | Performed by: INTERNAL MEDICINE

## 2017-03-20 RX ORDER — PREDNISONE 20 MG/1
60 TABLET ORAL DAILY
Status: DISCONTINUED | OUTPATIENT
Start: 2017-03-20 | End: 2017-03-20 | Stop reason: HOSPADM

## 2017-03-20 RX ORDER — PRAVASTATIN SODIUM 20 MG
10 TABLET ORAL
Status: DISCONTINUED | OUTPATIENT
Start: 2017-03-20 | End: 2017-03-20 | Stop reason: HOSPADM

## 2017-03-20 RX ORDER — CITALOPRAM 20 MG/1
20 TABLET ORAL DAILY
Status: DISCONTINUED | OUTPATIENT
Start: 2017-03-20 | End: 2017-03-20 | Stop reason: HOSPADM

## 2017-03-20 RX ORDER — GABAPENTIN 100 MG/1
100 CAPSULE ORAL 2 TIMES DAILY
Status: DISCONTINUED | OUTPATIENT
Start: 2017-03-20 | End: 2017-03-20 | Stop reason: HOSPADM

## 2017-03-20 RX ORDER — RANOLAZINE 500 MG/1
500 TABLET, EXTENDED RELEASE ORAL DAILY
Status: DISCONTINUED | OUTPATIENT
Start: 2017-03-20 | End: 2017-03-20 | Stop reason: HOSPADM

## 2017-03-20 RX ORDER — LORAZEPAM 1 MG/1
1 TABLET ORAL ONCE
Status: COMPLETED | OUTPATIENT
Start: 2017-03-20 | End: 2017-03-20

## 2017-03-20 RX ORDER — ASPIRIN 81 MG/1
81 TABLET, CHEWABLE ORAL DAILY
Status: DISCONTINUED | OUTPATIENT
Start: 2017-03-20 | End: 2017-03-20 | Stop reason: HOSPADM

## 2017-03-20 RX ORDER — FOLIC ACID 1 MG/1
1 TABLET ORAL 2 TIMES DAILY
Qty: 60 TABLET | Refills: 0 | Status: SHIPPED | OUTPATIENT
Start: 2017-03-20 | End: 2017-04-07

## 2017-03-20 RX ORDER — SIMVASTATIN 20 MG
20 TABLET ORAL
COMMUNITY
End: 2017-04-07

## 2017-03-20 RX ORDER — ALLOPURINOL 100 MG/1
100 TABLET ORAL DAILY
COMMUNITY
End: 2017-04-07

## 2017-03-20 RX ORDER — ALBUTEROL SULFATE 90 UG/1
2 AEROSOL, METERED RESPIRATORY (INHALATION) EVERY 4 HOURS PRN
Status: DISCONTINUED | OUTPATIENT
Start: 2017-03-20 | End: 2017-03-20 | Stop reason: HOSPADM

## 2017-03-20 RX ORDER — CLOPIDOGREL BISULFATE 75 MG/1
75 TABLET ORAL DAILY
Status: DISCONTINUED | OUTPATIENT
Start: 2017-03-20 | End: 2017-03-20

## 2017-03-20 RX ORDER — METOPROLOL TARTRATE 50 MG/1
50 TABLET, FILM COATED ORAL DAILY
Status: DISCONTINUED | OUTPATIENT
Start: 2017-03-20 | End: 2017-03-20 | Stop reason: HOSPADM

## 2017-03-20 RX ORDER — FOLIC ACID 1 MG/1
1 TABLET ORAL DAILY
COMMUNITY
End: 2017-03-20 | Stop reason: HOSPADM

## 2017-03-20 RX ORDER — SUCRALFATE ORAL 1 G/10ML
1000 SUSPENSION ORAL
Status: DISCONTINUED | OUTPATIENT
Start: 2017-03-20 | End: 2017-03-20 | Stop reason: HOSPADM

## 2017-03-20 RX ORDER — PREDNISONE 20 MG/1
60 TABLET ORAL DAILY
Refills: 0
Start: 2017-03-20 | End: 2017-03-26

## 2017-03-20 RX ORDER — ALBUTEROL SULFATE 2.5 MG/3ML
2.5 SOLUTION RESPIRATORY (INHALATION) EVERY 6 HOURS PRN
Status: DISCONTINUED | OUTPATIENT
Start: 2017-03-20 | End: 2017-03-20

## 2017-03-20 RX ORDER — FOLIC ACID 1 MG/1
1 TABLET ORAL 2 TIMES DAILY
Status: DISCONTINUED | OUTPATIENT
Start: 2017-03-20 | End: 2017-03-20 | Stop reason: HOSPADM

## 2017-03-20 RX ORDER — LISINOPRIL 20 MG/1
20 TABLET ORAL DAILY
Status: DISCONTINUED | OUTPATIENT
Start: 2017-03-20 | End: 2017-03-20 | Stop reason: HOSPADM

## 2017-03-20 RX ORDER — DIPHENOXYLATE HYDROCHLORIDE AND ATROPINE SULFATE 2.5; .025 MG/1; MG/1
1 TABLET ORAL DAILY
COMMUNITY

## 2017-03-20 RX ORDER — LORAZEPAM 0.5 MG/1
0.5 TABLET ORAL EVERY 8 HOURS PRN
Status: DISCONTINUED | OUTPATIENT
Start: 2017-03-20 | End: 2017-03-20 | Stop reason: HOSPADM

## 2017-03-20 RX ORDER — LOSARTAN POTASSIUM 50 MG/1
50 TABLET ORAL DAILY
COMMUNITY
End: 2017-04-07

## 2017-03-20 RX ORDER — PANTOPRAZOLE SODIUM 40 MG/1
40 TABLET, DELAYED RELEASE ORAL
Status: DISCONTINUED | OUTPATIENT
Start: 2017-03-20 | End: 2017-03-20 | Stop reason: HOSPADM

## 2017-03-20 RX ADMIN — PANTOPRAZOLE SODIUM 40 MG: 40 TABLET, DELAYED RELEASE ORAL at 13:37

## 2017-03-20 RX ADMIN — CITALOPRAM HYDROBROMIDE 20 MG: 20 TABLET ORAL at 13:36

## 2017-03-20 RX ADMIN — LORAZEPAM 1 MG: 1 TABLET ORAL at 19:16

## 2017-03-20 RX ADMIN — PRAVASTATIN SODIUM 10 MG: 20 TABLET ORAL at 18:33

## 2017-03-20 RX ADMIN — FOLIC ACID 1 MG: 1 TABLET ORAL at 13:35

## 2017-03-20 RX ADMIN — GABAPENTIN 100 MG: 100 CAPSULE ORAL at 18:33

## 2017-03-20 RX ADMIN — FOLIC ACID 1 MG: 1 TABLET ORAL at 18:33

## 2017-03-20 RX ADMIN — SUCRALFATE 1000 MG: 1 SUSPENSION ORAL at 18:32

## 2017-03-20 RX ADMIN — RANOLAZINE 500 MG: 500 TABLET, FILM COATED, EXTENDED RELEASE ORAL at 13:42

## 2017-03-20 RX ADMIN — PREDNISONE 20 MG: 20 TABLET ORAL at 13:37

## 2017-03-20 RX ADMIN — LORAZEPAM 0.5 MG: 0.5 TABLET ORAL at 13:36

## 2017-03-20 RX ADMIN — GABAPENTIN 100 MG: 100 CAPSULE ORAL at 13:35

## 2017-03-20 RX ADMIN — ASPIRIN 81 MG: 81 TABLET, CHEWABLE ORAL at 13:36

## 2017-03-20 NOTE — PLAN OF CARE

## 2017-03-20 NOTE — PROGRESS NOTES
Patient arrived to the unit sob on ambulation and visibly dizzy  Central labs were drawn and results were reported to Ashlie [place] Rn for Dr Huy Middleton had recommended that the patient go to the ER to be evaluated  The patient is aware that his WBC count went up and his Hgb was low  Patient refused to go to the ER  After speaking with Dr Huy Middleton on the phone directly he agreed to be seen

## 2017-03-21 LAB
ABO GROUP BLD BPU: NORMAL
ABO GROUP BLD BPU: NORMAL
ATRIAL RATE: 96 BPM
BPU ID: NORMAL
BPU ID: NORMAL
CROSSMATCH: NORMAL
CROSSMATCH: NORMAL
HCV AB SER-ACNC: NONREACTIVE
P AXIS: 56 DEGREES
PR INTERVAL: 142 MS
QRS AXIS: 4 DEGREES
QRSD INTERVAL: 78 MS
QT INTERVAL: 364 MS
QTC INTERVAL: 459 MS
T WAVE AXIS: 37 DEGREES
UNIT DISPENSE STATUS: NORMAL
UNIT DISPENSE STATUS: NORMAL
UNIT PRODUCT CODE: NORMAL
UNIT PRODUCT CODE: NORMAL
UNIT RH: NORMAL
UNIT RH: NORMAL
VENTRICULAR RATE: 96 BPM

## 2017-03-23 ENCOUNTER — HOSPITAL ENCOUNTER (OUTPATIENT)
Dept: INFUSION CENTER | Facility: CLINIC | Age: 62
Discharge: HOME | End: 2017-03-23
Payer: MEDICARE

## 2017-04-05 ENCOUNTER — HOSPITAL ENCOUNTER (OUTPATIENT)
Dept: INFUSION CENTER | Facility: CLINIC | Age: 62
Discharge: HOME | End: 2017-04-05
Payer: MEDICARE

## 2017-04-05 ENCOUNTER — ALLSCRIPTS OFFICE VISIT (OUTPATIENT)
Dept: OTHER | Facility: OTHER | Age: 62
End: 2017-04-05

## 2017-04-05 LAB
ALBUMIN SERPL BCP-MCNC: 3.6 G/DL (ref 3.5–5)
ALP SERPL-CCNC: 48 U/L (ref 46–116)
ALT SERPL W P-5'-P-CCNC: <6 U/L (ref 12–78)
ANION GAP SERPL CALCULATED.3IONS-SCNC: 8 MMOL/L (ref 4–13)
ANISOCYTOSIS BLD QL SMEAR: PRESENT
AST SERPL W P-5'-P-CCNC: 12 U/L (ref 5–45)
BASOPHILS # BLD AUTO: 0 THOUSAND/UL (ref 0–0.1)
BASOPHILS NFR MAR MANUAL: 0 % (ref 0–1)
BILIRUB SERPL-MCNC: 2.1 MG/DL (ref 0.2–1)
BUN SERPL-MCNC: 27 MG/DL (ref 5–25)
CALCIUM SERPL-MCNC: 9.3 MG/DL (ref 8.3–10.1)
CHLORIDE SERPL-SCNC: 104 MMOL/L (ref 100–108)
CO2 SERPL-SCNC: 21 MMOL/L (ref 21–32)
CREAT SERPL-MCNC: 1.04 MG/DL (ref 0.6–1.3)
EOSINOPHIL # BLD AUTO: 0 THOUSAND/UL (ref 0–0.61)
EOSINOPHIL NFR BLD MANUAL: 0 % (ref 0–6)
ERYTHROCYTE [DISTWIDTH] IN BLOOD BY AUTOMATED COUNT: 22.3 % (ref 11.6–15.1)
GFR SERPL CREATININE-BSD FRML MDRD: >60 ML/MIN/1.73SQ M
GLUCOSE SERPL-MCNC: 105 MG/DL (ref 65–140)
HCT VFR BLD AUTO: 23.7 % (ref 36.5–49.3)
HGB BLD-MCNC: 8.3 G/DL (ref 12–17)
LDH SERPL-CCNC: 303 U/L (ref 81–234)
LYMPHOCYTES # BLD AUTO: 7.7 THOUSAND/UL (ref 0.6–4.47)
LYMPHOCYTES # BLD AUTO: 70 % (ref 14–44)
MCH RBC QN AUTO: 30.7 PG (ref 26.8–34.3)
MCHC RBC AUTO-ENTMCNC: 35.1 G/DL (ref 31.4–37.4)
MCV RBC AUTO: 87 FL (ref 82–98)
MONOCYTES # BLD AUTO: 0.33 THOUSAND/UL (ref 0–1.22)
MONOCYTES NFR BLD AUTO: 3 % (ref 4–12)
NEUTS BAND NFR BLD MANUAL: 0 % (ref 0–8)
NEUTS SEG # BLD: 2.97 THOUSAND/UL (ref 1.81–6.82)
NEUTS SEG NFR BLD AUTO: 27 % (ref 43–75)
PLATELET # BLD AUTO: 75 THOUSANDS/UL (ref 149–390)
PLATELET BLD QL SMEAR: ABNORMAL
PMV BLD AUTO: 7.7 FL (ref 8.9–12.7)
POTASSIUM SERPL-SCNC: 4.1 MMOL/L (ref 3.5–5.3)
PROT SERPL-MCNC: 7.5 G/DL (ref 6.4–8.2)
RBC # BLD AUTO: 2.71 MILLION/UL (ref 3.88–5.62)
RETICS # AUTO: ABNORMAL 10*3/UL (ref 14356–105094)
RETICS # CALC: 5.16 % (ref 0.37–1.87)
SODIUM SERPL-SCNC: 133 MMOL/L (ref 136–145)
TOTAL CELLS COUNTED SPEC: 100
URATE SERPL-MCNC: 3.7 MG/DL (ref 4.2–8)
WBC # BLD AUTO: 11 THOUSAND/UL (ref 4.31–10.16)
WBC NRBC COR # BLD: 11 THOUSAND/UL (ref 4.31–10.16)

## 2017-04-05 PROCEDURE — 84550 ASSAY OF BLOOD/URIC ACID: CPT | Performed by: PHYSICIAN ASSISTANT

## 2017-04-05 PROCEDURE — 85027 COMPLETE CBC AUTOMATED: CPT | Performed by: PHYSICIAN ASSISTANT

## 2017-04-05 PROCEDURE — 80053 COMPREHEN METABOLIC PANEL: CPT | Performed by: PHYSICIAN ASSISTANT

## 2017-04-05 PROCEDURE — 85045 AUTOMATED RETICULOCYTE COUNT: CPT | Performed by: PHYSICIAN ASSISTANT

## 2017-04-05 PROCEDURE — 85007 BL SMEAR W/DIFF WBC COUNT: CPT | Performed by: PHYSICIAN ASSISTANT

## 2017-04-05 PROCEDURE — 83615 LACTATE (LD) (LDH) ENZYME: CPT | Performed by: PHYSICIAN ASSISTANT

## 2017-04-06 RX ORDER — SODIUM CHLORIDE 9 MG/ML
20 INJECTION, SOLUTION INTRAVENOUS CONTINUOUS
Status: DISCONTINUED | OUTPATIENT
Start: 2017-04-07 | End: 2017-04-10 | Stop reason: HOSPADM

## 2017-04-06 RX ORDER — LORAZEPAM 2 MG/ML
0.5 INJECTION INTRAMUSCULAR ONCE
Status: COMPLETED | OUTPATIENT
Start: 2017-04-07 | End: 2017-04-07

## 2017-04-06 RX ORDER — ACETAMINOPHEN 325 MG/1
650 TABLET ORAL ONCE
Status: COMPLETED | OUTPATIENT
Start: 2017-04-07 | End: 2017-04-07

## 2017-04-07 ENCOUNTER — HOSPITAL ENCOUNTER (OUTPATIENT)
Dept: INFUSION CENTER | Facility: CLINIC | Age: 62
Discharge: HOME | End: 2017-04-07
Payer: MEDICARE

## 2017-04-07 VITALS
TEMPERATURE: 96.3 F | WEIGHT: 180.78 LBS | RESPIRATION RATE: 18 BRPM | SYSTOLIC BLOOD PRESSURE: 103 MMHG | HEART RATE: 78 BPM | HEIGHT: 73 IN | DIASTOLIC BLOOD PRESSURE: 70 MMHG | BODY MASS INDEX: 23.96 KG/M2

## 2017-04-07 LAB
ABO GROUP BLD: NORMAL
ALBUMIN SERPL BCP-MCNC: 3.4 G/DL (ref 3.5–5)
ALP SERPL-CCNC: 53 U/L (ref 46–116)
ALT SERPL W P-5'-P-CCNC: <6 U/L (ref 12–78)
ANION GAP SERPL CALCULATED.3IONS-SCNC: 9 MMOL/L (ref 4–13)
ANISOCYTOSIS BLD QL SMEAR: PRESENT
AST SERPL W P-5'-P-CCNC: 8 U/L (ref 5–45)
BASOPHILS # BLD AUTO: 0 THOUSAND/UL (ref 0–0.1)
BASOPHILS NFR MAR MANUAL: 0 % (ref 0–1)
BILIRUB SERPL-MCNC: 1.5 MG/DL (ref 0.2–1)
BLD GP AB SCN SERPL QL: NEGATIVE
BUN SERPL-MCNC: 24 MG/DL (ref 5–25)
CALCIUM SERPL-MCNC: 8.8 MG/DL (ref 8.3–10.1)
CHLORIDE SERPL-SCNC: 106 MMOL/L (ref 100–108)
CO2 SERPL-SCNC: 23 MMOL/L (ref 21–32)
CREAT SERPL-MCNC: 1.09 MG/DL (ref 0.6–1.3)
EOSINOPHIL # BLD AUTO: 0.25 THOUSAND/UL (ref 0–0.61)
EOSINOPHIL NFR BLD MANUAL: 3 % (ref 0–6)
ERYTHROCYTE [DISTWIDTH] IN BLOOD BY AUTOMATED COUNT: 25.1 % (ref 11.6–15.1)
GFR SERPL CREATININE-BSD FRML MDRD: >60 ML/MIN/1.73SQ M
GLUCOSE SERPL-MCNC: 98 MG/DL (ref 65–140)
HCT VFR BLD AUTO: 22.8 % (ref 36.5–49.3)
HGB BLD-MCNC: 7.8 G/DL (ref 12–17)
LDH SERPL-CCNC: 272 U/L (ref 81–234)
LG PLATELETS BLD QL SMEAR: PRESENT
LYMPHOCYTES # BLD AUTO: 6.07 THOUSAND/UL (ref 0.6–4.47)
LYMPHOCYTES # BLD AUTO: 74 % (ref 14–44)
MCH RBC QN AUTO: 31.1 PG (ref 26.8–34.3)
MCHC RBC AUTO-ENTMCNC: 34.1 G/DL (ref 31.4–37.4)
MCV RBC AUTO: 91 FL (ref 82–98)
MONOCYTES # BLD AUTO: 0.25 THOUSAND/UL (ref 0–1.22)
MONOCYTES NFR BLD AUTO: 3 % (ref 4–12)
NEUTS BAND NFR BLD MANUAL: 0 % (ref 0–8)
NEUTS SEG # BLD: 1.64 THOUSAND/UL (ref 1.81–6.82)
NEUTS SEG NFR BLD AUTO: 20 % (ref 43–75)
PLATELET # BLD AUTO: 115 THOUSANDS/UL (ref 149–390)
PLATELET BLD QL SMEAR: ABNORMAL
PMV BLD AUTO: 7.2 FL (ref 8.9–12.7)
POLYCHROMASIA BLD QL SMEAR: PRESENT
POTASSIUM SERPL-SCNC: 4.4 MMOL/L (ref 3.5–5.3)
PROT SERPL-MCNC: 6.7 G/DL (ref 6.4–8.2)
RBC # BLD AUTO: 2.49 MILLION/UL (ref 3.88–5.62)
RETICS # AUTO: ABNORMAL 10*3/UL (ref 14356–105094)
RETICS # CALC: 6.71 % (ref 0.37–1.87)
RH BLD: POSITIVE
SODIUM SERPL-SCNC: 138 MMOL/L (ref 136–145)
TOTAL CELLS COUNTED SPEC: 100
URATE SERPL-MCNC: 3.9 MG/DL (ref 4.2–8)
WBC # BLD AUTO: 8.2 THOUSAND/UL (ref 4.31–10.16)
WBC NRBC COR # BLD: 8.2 THOUSAND/UL (ref 4.31–10.16)

## 2017-04-07 PROCEDURE — 83615 LACTATE (LD) (LDH) ENZYME: CPT | Performed by: INTERNAL MEDICINE

## 2017-04-07 PROCEDURE — 96413 CHEMO IV INFUSION 1 HR: CPT

## 2017-04-07 PROCEDURE — 86901 BLOOD TYPING SEROLOGIC RH(D): CPT | Performed by: INTERNAL MEDICINE

## 2017-04-07 PROCEDURE — 85027 COMPLETE CBC AUTOMATED: CPT | Performed by: INTERNAL MEDICINE

## 2017-04-07 PROCEDURE — 85007 BL SMEAR W/DIFF WBC COUNT: CPT | Performed by: INTERNAL MEDICINE

## 2017-04-07 PROCEDURE — P9040 RBC LEUKOREDUCED IRRADIATED: HCPCS

## 2017-04-07 PROCEDURE — 96367 TX/PROPH/DG ADDL SEQ IV INF: CPT

## 2017-04-07 PROCEDURE — 86921 COMPATIBILITY TEST INCUBATE: CPT

## 2017-04-07 PROCEDURE — 84550 ASSAY OF BLOOD/URIC ACID: CPT | Performed by: INTERNAL MEDICINE

## 2017-04-07 PROCEDURE — 80053 COMPREHEN METABOLIC PANEL: CPT | Performed by: INTERNAL MEDICINE

## 2017-04-07 PROCEDURE — 86922 COMPATIBILITY TEST ANTIGLOB: CPT

## 2017-04-07 PROCEDURE — 96415 CHEMO IV INFUSION ADDL HR: CPT

## 2017-04-07 PROCEDURE — 86850 RBC ANTIBODY SCREEN: CPT | Performed by: INTERNAL MEDICINE

## 2017-04-07 PROCEDURE — 96375 TX/PRO/DX INJ NEW DRUG ADDON: CPT

## 2017-04-07 PROCEDURE — 36430 TRANSFUSION BLD/BLD COMPNT: CPT

## 2017-04-07 PROCEDURE — 85045 AUTOMATED RETICULOCYTE COUNT: CPT | Performed by: INTERNAL MEDICINE

## 2017-04-07 PROCEDURE — 86900 BLOOD TYPING SEROLOGIC ABO: CPT | Performed by: INTERNAL MEDICINE

## 2017-04-07 RX ORDER — ACETAMINOPHEN 325 MG/1
650 TABLET ORAL ONCE
Status: COMPLETED | OUTPATIENT
Start: 2017-04-07 | End: 2017-04-07

## 2017-04-07 RX ADMIN — FAMOTIDINE 20 MG: 10 INJECTION, SOLUTION INTRAVENOUS at 09:15

## 2017-04-07 RX ADMIN — ACETAMINOPHEN 650 MG: 325 TABLET, FILM COATED ORAL at 13:46

## 2017-04-07 RX ADMIN — SODIUM CHLORIDE 20 ML/HR: 0.9 INJECTION, SOLUTION INTRAVENOUS at 08:30

## 2017-04-07 RX ADMIN — DIPHENHYDRAMINE HYDROCHLORIDE 25 MG: 50 INJECTION, SOLUTION INTRAMUSCULAR; INTRAVENOUS at 08:49

## 2017-04-07 RX ADMIN — ACETAMINOPHEN 650 MG: 325 TABLET, FILM COATED ORAL at 08:50

## 2017-04-07 RX ADMIN — HYDROCORTISONE SODIUM SUCCINATE 100 MG: 100 INJECTION, POWDER, FOR SOLUTION INTRAMUSCULAR; INTRAVENOUS at 08:50

## 2017-04-07 RX ADMIN — LORAZEPAM 0.5 MG: 2 INJECTION INTRAMUSCULAR; INTRAVENOUS at 09:39

## 2017-04-07 RX ADMIN — RITUXIMAB 800 MG: 10 INJECTION, SOLUTION INTRAVENOUS at 09:41

## 2017-04-07 NOTE — PROGRESS NOTES
Patient tolerated chemotherapy infusion and blood transfusion today without complications  Patient aware to return on Monday for next appointment   Declined AVS

## 2017-04-10 ENCOUNTER — HOSPITAL ENCOUNTER (OUTPATIENT)
Dept: INFUSION CENTER | Facility: CLINIC | Age: 62
Discharge: HOME | End: 2017-04-10
Payer: MEDICARE

## 2017-04-10 VITALS
RESPIRATION RATE: 16 BRPM | HEART RATE: 85 BPM | DIASTOLIC BLOOD PRESSURE: 76 MMHG | TEMPERATURE: 99.2 F | SYSTOLIC BLOOD PRESSURE: 121 MMHG

## 2017-04-10 LAB
ANISOCYTOSIS BLD QL SMEAR: PRESENT
BASOPHILS # BLD AUTO: 0 THOUSAND/UL (ref 0–0.1)
BASOPHILS NFR MAR MANUAL: 0 % (ref 0–1)
DACRYOCYTES BLD QL SMEAR: PRESENT
EOSINOPHIL # BLD AUTO: 0.05 THOUSAND/UL (ref 0–0.61)
EOSINOPHIL NFR BLD MANUAL: 1 % (ref 0–6)
ERYTHROCYTE [DISTWIDTH] IN BLOOD BY AUTOMATED COUNT: 25.5 % (ref 11.6–15.1)
HCT VFR BLD AUTO: 25.1 % (ref 36.5–49.3)
HGB BLD-MCNC: 8.4 G/DL (ref 12–17)
LYMPHOCYTES # BLD AUTO: 3.07 THOUSAND/UL (ref 0.6–4.47)
LYMPHOCYTES # BLD AUTO: 58 % (ref 14–44)
MCH RBC QN AUTO: 31.3 PG (ref 26.8–34.3)
MCHC RBC AUTO-ENTMCNC: 33.7 G/DL (ref 31.4–37.4)
MCV RBC AUTO: 93 FL (ref 82–98)
MONOCYTES # BLD AUTO: 0.11 THOUSAND/UL (ref 0–1.22)
MONOCYTES NFR BLD AUTO: 2 % (ref 4–12)
NEUTS BAND NFR BLD MANUAL: 1 % (ref 0–8)
NEUTS SEG # BLD: 2.07 THOUSAND/UL (ref 1.81–6.82)
NEUTS SEG NFR BLD AUTO: 38 % (ref 43–75)
OVALOCYTES BLD QL SMEAR: PRESENT
PLATELET # BLD AUTO: 148 THOUSANDS/UL (ref 149–390)
PLATELET BLD QL SMEAR: ABNORMAL
PMV BLD AUTO: 7.1 FL (ref 8.9–12.7)
POLYCHROMASIA BLD QL SMEAR: PRESENT
RBC # BLD AUTO: 2.69 MILLION/UL (ref 3.88–5.62)
TOTAL CELLS COUNTED SPEC: 100
WBC # BLD AUTO: 5.3 THOUSAND/UL (ref 4.31–10.16)
WBC NRBC COR # BLD: 5.3 THOUSAND/UL (ref 4.31–10.16)

## 2017-04-10 PROCEDURE — 85007 BL SMEAR W/DIFF WBC COUNT: CPT | Performed by: INTERNAL MEDICINE

## 2017-04-10 PROCEDURE — 85027 COMPLETE CBC AUTOMATED: CPT | Performed by: INTERNAL MEDICINE

## 2017-04-10 NOTE — PLAN OF CARE

## 2017-04-12 ENCOUNTER — HOSPITAL ENCOUNTER (OUTPATIENT)
Dept: INFUSION CENTER | Facility: CLINIC | Age: 62
Discharge: HOME | End: 2017-04-12
Payer: MEDICARE

## 2017-04-12 ENCOUNTER — ALLSCRIPTS OFFICE VISIT (OUTPATIENT)
Dept: OTHER | Facility: OTHER | Age: 62
End: 2017-04-12

## 2017-04-12 VITALS
HEART RATE: 82 BPM | RESPIRATION RATE: 18 BRPM | SYSTOLIC BLOOD PRESSURE: 127 MMHG | TEMPERATURE: 98 F | DIASTOLIC BLOOD PRESSURE: 74 MMHG

## 2017-04-12 LAB
ANISOCYTOSIS BLD QL SMEAR: PRESENT
BASOPHILS # BLD AUTO: 0.04 THOUSAND/UL (ref 0–0.1)
BASOPHILS NFR MAR MANUAL: 1 % (ref 0–1)
EOSINOPHIL # BLD AUTO: 0.07 THOUSAND/UL (ref 0–0.61)
EOSINOPHIL NFR BLD MANUAL: 2 % (ref 0–6)
ERYTHROCYTE [DISTWIDTH] IN BLOOD BY AUTOMATED COUNT: 25.8 % (ref 11.6–15.1)
HCT VFR BLD AUTO: 25.2 % (ref 36.5–49.3)
HGB BLD-MCNC: 8.4 G/DL (ref 12–17)
LYMPHOCYTES # BLD AUTO: 1.4 THOUSAND/UL (ref 0.6–4.47)
LYMPHOCYTES # BLD AUTO: 39 % (ref 14–44)
MCH RBC QN AUTO: 31.5 PG (ref 26.8–34.3)
MCHC RBC AUTO-ENTMCNC: 33.4 G/DL (ref 31.4–37.4)
MCV RBC AUTO: 94 FL (ref 82–98)
MONOCYTES # BLD AUTO: 0.07 THOUSAND/UL (ref 0–1.22)
MONOCYTES NFR BLD AUTO: 2 % (ref 4–12)
NEUTS BAND NFR BLD MANUAL: 2 % (ref 0–8)
NEUTS SEG # BLD: 2.02 THOUSAND/UL (ref 1.81–6.82)
NEUTS SEG NFR BLD AUTO: 54 % (ref 43–75)
OVALOCYTES BLD QL SMEAR: PRESENT
PLATELET # BLD AUTO: 165 THOUSANDS/UL (ref 149–390)
PLATELET BLD QL SMEAR: ADEQUATE
PMV BLD AUTO: 6.7 FL (ref 8.9–12.7)
RBC # BLD AUTO: 2.67 MILLION/UL (ref 3.88–5.62)
TOTAL CELLS COUNTED SPEC: 100
WBC # BLD AUTO: 3.6 THOUSAND/UL (ref 4.31–10.16)
WBC NRBC COR # BLD: 3.6 THOUSAND/UL (ref 4.31–10.16)

## 2017-04-12 PROCEDURE — 85007 BL SMEAR W/DIFF WBC COUNT: CPT | Performed by: INTERNAL MEDICINE

## 2017-04-12 PROCEDURE — 85027 COMPLETE CBC AUTOMATED: CPT | Performed by: INTERNAL MEDICINE

## 2017-04-13 RX ORDER — ACETAMINOPHEN 325 MG/1
650 TABLET ORAL ONCE
Status: COMPLETED | OUTPATIENT
Start: 2017-04-14 | End: 2017-04-14

## 2017-04-13 RX ORDER — SODIUM CHLORIDE 9 MG/ML
20 INJECTION, SOLUTION INTRAVENOUS CONTINUOUS
Status: DISCONTINUED | OUTPATIENT
Start: 2017-04-14 | End: 2017-04-17 | Stop reason: HOSPADM

## 2017-04-13 RX ORDER — LORAZEPAM 2 MG/ML
0.5 INJECTION INTRAMUSCULAR ONCE
Status: COMPLETED | OUTPATIENT
Start: 2017-04-14 | End: 2017-04-14

## 2017-04-14 ENCOUNTER — HOSPITAL ENCOUNTER (OUTPATIENT)
Dept: INFUSION CENTER | Facility: CLINIC | Age: 62
Discharge: HOME | End: 2017-04-14
Payer: MEDICARE

## 2017-04-14 VITALS
SYSTOLIC BLOOD PRESSURE: 122 MMHG | RESPIRATION RATE: 18 BRPM | DIASTOLIC BLOOD PRESSURE: 69 MMHG | BODY MASS INDEX: 26.01 KG/M2 | HEART RATE: 86 BPM | WEIGHT: 192.02 LBS | HEIGHT: 72 IN | TEMPERATURE: 97.8 F

## 2017-04-14 LAB
ALBUMIN SERPL BCP-MCNC: 3.6 G/DL (ref 3.5–5)
ALP SERPL-CCNC: 42 U/L (ref 46–116)
ALT SERPL W P-5'-P-CCNC: <6 U/L (ref 12–78)
ANION GAP SERPL CALCULATED.3IONS-SCNC: 8 MMOL/L (ref 4–13)
ANISOCYTOSIS BLD QL SMEAR: PRESENT
AST SERPL W P-5'-P-CCNC: 9 U/L (ref 5–45)
BASOPHILS # BLD AUTO: 0 THOUSAND/UL (ref 0–0.1)
BASOPHILS NFR MAR MANUAL: 0 % (ref 0–1)
BILIRUB SERPL-MCNC: 0.8 MG/DL (ref 0.2–1)
BUN SERPL-MCNC: 20 MG/DL (ref 5–25)
CALCIUM SERPL-MCNC: 9.1 MG/DL (ref 8.3–10.1)
CHLORIDE SERPL-SCNC: 109 MMOL/L (ref 100–108)
CO2 SERPL-SCNC: 25 MMOL/L (ref 21–32)
CREAT SERPL-MCNC: 1.32 MG/DL (ref 0.6–1.3)
DACRYOCYTES BLD QL SMEAR: PRESENT
EOSINOPHIL # BLD AUTO: 0.15 THOUSAND/UL (ref 0–0.61)
EOSINOPHIL NFR BLD MANUAL: 4 % (ref 0–6)
ERYTHROCYTE [DISTWIDTH] IN BLOOD BY AUTOMATED COUNT: 25.9 % (ref 11.6–15.1)
GFR SERPL CREATININE-BSD FRML MDRD: 55 ML/MIN/1.73SQ M
GLUCOSE SERPL-MCNC: 100 MG/DL (ref 65–140)
HCT VFR BLD AUTO: 26.4 % (ref 36.5–49.3)
HGB BLD-MCNC: 8.5 G/DL (ref 12–17)
LDH SERPL-CCNC: 200 U/L (ref 81–234)
LYMPHOCYTES # BLD AUTO: 1.71 THOUSAND/UL (ref 0.6–4.47)
LYMPHOCYTES # BLD AUTO: 45 % (ref 14–44)
MCH RBC QN AUTO: 31.1 PG (ref 26.8–34.3)
MCHC RBC AUTO-ENTMCNC: 32.3 G/DL (ref 31.4–37.4)
MCV RBC AUTO: 96 FL (ref 82–98)
MONOCYTES # BLD AUTO: 0.11 THOUSAND/UL (ref 0–1.22)
MONOCYTES NFR BLD AUTO: 3 % (ref 4–12)
NEUTS BAND NFR BLD MANUAL: 3 % (ref 0–8)
NEUTS SEG # BLD: 1.79 THOUSAND/UL (ref 1.81–6.82)
NEUTS SEG NFR BLD AUTO: 44 % (ref 43–75)
OVALOCYTES BLD QL SMEAR: PRESENT
PLATELET # BLD AUTO: 167 THOUSANDS/UL (ref 149–390)
PLATELET BLD QL SMEAR: ADEQUATE
PMV BLD AUTO: 6.8 FL (ref 8.9–12.7)
POLYCHROMASIA BLD QL SMEAR: PRESENT
POTASSIUM SERPL-SCNC: 3.9 MMOL/L (ref 3.5–5.3)
PROT SERPL-MCNC: 6.5 G/DL (ref 6.4–8.2)
RBC # BLD AUTO: 2.74 MILLION/UL (ref 3.88–5.62)
RETICS # AUTO: ABNORMAL 10*3/UL (ref 14356–105094)
RETICS # CALC: 7.24 % (ref 0.37–1.87)
SODIUM SERPL-SCNC: 142 MMOL/L (ref 136–145)
TOTAL CELLS COUNTED SPEC: 100
URATE SERPL-MCNC: 5.2 MG/DL (ref 4.2–8)
VARIANT LYMPHS # BLD AUTO: 1 % (ref 0–0)
WBC # BLD AUTO: 3.8 THOUSAND/UL (ref 4.31–10.16)
WBC NRBC COR # BLD: 3.8 THOUSAND/UL (ref 4.31–10.16)

## 2017-04-14 PROCEDURE — 80053 COMPREHEN METABOLIC PANEL: CPT | Performed by: INTERNAL MEDICINE

## 2017-04-14 PROCEDURE — 96413 CHEMO IV INFUSION 1 HR: CPT

## 2017-04-14 PROCEDURE — 85007 BL SMEAR W/DIFF WBC COUNT: CPT | Performed by: INTERNAL MEDICINE

## 2017-04-14 PROCEDURE — 85027 COMPLETE CBC AUTOMATED: CPT | Performed by: INTERNAL MEDICINE

## 2017-04-14 PROCEDURE — 96375 TX/PRO/DX INJ NEW DRUG ADDON: CPT

## 2017-04-14 PROCEDURE — 96367 TX/PROPH/DG ADDL SEQ IV INF: CPT

## 2017-04-14 PROCEDURE — 84550 ASSAY OF BLOOD/URIC ACID: CPT | Performed by: INTERNAL MEDICINE

## 2017-04-14 PROCEDURE — 85045 AUTOMATED RETICULOCYTE COUNT: CPT | Performed by: INTERNAL MEDICINE

## 2017-04-14 PROCEDURE — 96415 CHEMO IV INFUSION ADDL HR: CPT

## 2017-04-14 PROCEDURE — 83615 LACTATE (LD) (LDH) ENZYME: CPT | Performed by: INTERNAL MEDICINE

## 2017-04-14 RX ADMIN — RITUXIMAB 800 MG: 10 INJECTION, SOLUTION INTRAVENOUS at 10:57

## 2017-04-14 RX ADMIN — HYDROCORTISONE SODIUM SUCCINATE 100 MG: 100 INJECTION, POWDER, FOR SOLUTION INTRAMUSCULAR; INTRAVENOUS at 09:23

## 2017-04-14 RX ADMIN — LORAZEPAM 0.5 MG: 2 INJECTION INTRAMUSCULAR; INTRAVENOUS at 09:36

## 2017-04-14 RX ADMIN — DIPHENHYDRAMINE HYDROCHLORIDE 25 MG: 50 INJECTION, SOLUTION INTRAMUSCULAR; INTRAVENOUS at 09:43

## 2017-04-14 RX ADMIN — SODIUM CHLORIDE 20 ML/HR: 0.9 INJECTION, SOLUTION INTRAVENOUS at 09:17

## 2017-04-14 RX ADMIN — ACETAMINOPHEN 650 MG: 325 TABLET, FILM COATED ORAL at 09:17

## 2017-04-14 RX ADMIN — FAMOTIDINE 20 MG: 10 INJECTION, SOLUTION INTRAVENOUS at 10:11

## 2017-04-14 NOTE — PLAN OF CARE
Problem: Potential for Falls  Goal: Patient will remain free of falls  INTERVENTIONS:  - Assess patient frequently for physical needs  - Identify cognitive and physical deficits and behaviors that affect risk of falls    - [place] fall precautions as indicated by assessment   - Educate patient/family on patient safety including physical limitations  - Instruct patient to call for assistance with activity based on assessment  - Modify environment to reduce risk of injury  - Consider OT/PT consult to assist with strengthening/mobility   Outcome: Progressing

## 2017-04-17 ENCOUNTER — HOSPITAL ENCOUNTER (OUTPATIENT)
Dept: INFUSION CENTER | Facility: CLINIC | Age: 62
Discharge: HOME | End: 2017-04-17
Payer: MEDICARE

## 2017-04-17 ENCOUNTER — GENERIC CONVERSION - ENCOUNTER (OUTPATIENT)
Dept: OTHER | Facility: OTHER | Age: 62
End: 2017-04-17

## 2017-04-17 LAB
ANISOCYTOSIS BLD QL SMEAR: PRESENT
DACRYOCYTES BLD QL SMEAR: PRESENT
EOSINOPHIL # BLD AUTO: 0.21 THOUSAND/UL (ref 0–0.61)
EOSINOPHIL NFR BLD MANUAL: 5 % (ref 0–6)
ERYTHROCYTE [DISTWIDTH] IN BLOOD BY AUTOMATED COUNT: 24.5 % (ref 11.6–15.1)
HCT VFR BLD AUTO: 27.8 % (ref 36.5–49.3)
HGB BLD-MCNC: 9.2 G/DL (ref 12–17)
LYMPHOCYTES # BLD AUTO: 1.93 THOUSAND/UL (ref 0.6–4.47)
LYMPHOCYTES # BLD AUTO: 46 % (ref 14–44)
MCH RBC QN AUTO: 31.4 PG (ref 26.8–34.3)
MCHC RBC AUTO-ENTMCNC: 33.1 G/DL (ref 31.4–37.4)
MCV RBC AUTO: 95 FL (ref 82–98)
MONOCYTES # BLD AUTO: 0.04 THOUSAND/UL (ref 0–1.22)
MONOCYTES NFR BLD AUTO: 1 % (ref 4–12)
NEUTS BAND NFR BLD MANUAL: 0 % (ref 0–8)
NEUTS SEG # BLD: 1.89 THOUSAND/UL (ref 1.81–6.82)
NEUTS SEG NFR BLD AUTO: 45 % (ref 43–75)
OVALOCYTES BLD QL SMEAR: PRESENT
PLATELET # BLD AUTO: 171 THOUSANDS/UL (ref 149–390)
PLATELET BLD QL SMEAR: ADEQUATE
PMV BLD AUTO: 6.9 FL (ref 8.9–12.7)
RBC # BLD AUTO: 2.92 MILLION/UL (ref 3.88–5.62)
TOTAL CELLS COUNTED SPEC: 100
UNIDENT CELLS # BLD: 1 % (ref 0–0)
VARIANT LYMPHS # BLD AUTO: 2 % (ref 0–0)
WBC # BLD AUTO: 4.2 THOUSAND/UL (ref 4.31–10.16)
WBC NRBC COR # BLD: 4.2 THOUSAND/UL (ref 4.31–10.16)

## 2017-04-17 PROCEDURE — 85007 BL SMEAR W/DIFF WBC COUNT: CPT | Performed by: INTERNAL MEDICINE

## 2017-04-17 PROCEDURE — 85027 COMPLETE CBC AUTOMATED: CPT | Performed by: INTERNAL MEDICINE

## 2017-04-17 NOTE — PROGRESS NOTES
Blood work collected via port a cath  Port flushed per protocol  Pt is aware of next appointment   Refused AVS

## 2017-04-17 NOTE — PLAN OF CARE
Problem: Potential for Falls  Goal: Patient will remain free of falls  INTERVENTIONS:  - Assess patient frequently for physical needs  - Identify cognitive and physical deficits and behaviors that affect risk of falls    - [place] fall precautions as indicated by assessment   - Educate patient/family on patient safety including physical limitations  - Instruct patient to call for assistance with activity based on assessment  - Modify environment to reduce risk of injury  - Consider OT/PT consult to assist with strengthening/mobility   Outcome: Progressing

## 2017-04-18 ENCOUNTER — GENERIC CONVERSION - ENCOUNTER (OUTPATIENT)
Dept: OTHER | Facility: OTHER | Age: 62
End: 2017-04-18

## 2017-04-19 ENCOUNTER — HOSPITAL ENCOUNTER (OUTPATIENT)
Dept: INFUSION CENTER | Facility: CLINIC | Age: 62
Discharge: HOME | End: 2017-04-19
Payer: MEDICARE

## 2017-04-19 LAB
ANISOCYTOSIS BLD QL SMEAR: PRESENT
BASOPHILS # BLD AUTO: 0.05 THOUSAND/UL (ref 0–0.1)
BASOPHILS NFR MAR MANUAL: 1 % (ref 0–1)
DACRYOCYTES BLD QL SMEAR: PRESENT
EOSINOPHIL # BLD AUTO: 0.18 THOUSAND/UL (ref 0–0.61)
EOSINOPHIL NFR BLD MANUAL: 4 % (ref 0–6)
ERYTHROCYTE [DISTWIDTH] IN BLOOD BY AUTOMATED COUNT: 25 % (ref 11.6–15.1)
HCT VFR BLD AUTO: 30.2 % (ref 36.5–49.3)
HGB BLD-MCNC: 9.8 G/DL (ref 12–17)
LYMPHOCYTES # BLD AUTO: 2.16 THOUSAND/UL (ref 0.6–4.47)
LYMPHOCYTES # BLD AUTO: 48 % (ref 14–44)
MCH RBC QN AUTO: 31.2 PG (ref 26.8–34.3)
MCHC RBC AUTO-ENTMCNC: 32.5 G/DL (ref 31.4–37.4)
MCV RBC AUTO: 96 FL (ref 82–98)
MONOCYTES # BLD AUTO: 0.23 THOUSAND/UL (ref 0–1.22)
MONOCYTES NFR BLD AUTO: 5 % (ref 4–12)
NEUTS BAND NFR BLD MANUAL: 2 % (ref 0–8)
NEUTS SEG # BLD: 1.89 THOUSAND/UL (ref 1.81–6.82)
NEUTS SEG NFR BLD AUTO: 40 % (ref 43–75)
PLATELET # BLD AUTO: 171 THOUSANDS/UL (ref 149–390)
PLATELET BLD QL SMEAR: ADEQUATE
PMV BLD AUTO: 6.3 FL (ref 8.9–12.7)
POLYCHROMASIA BLD QL SMEAR: PRESENT
RBC # BLD AUTO: 3.15 MILLION/UL (ref 3.88–5.62)
TOTAL CELLS COUNTED SPEC: 100
WBC # BLD AUTO: 4.5 THOUSAND/UL (ref 4.31–10.16)
WBC NRBC COR # BLD: 4.5 THOUSAND/UL (ref 4.31–10.16)

## 2017-04-19 PROCEDURE — 85007 BL SMEAR W/DIFF WBC COUNT: CPT | Performed by: INTERNAL MEDICINE

## 2017-04-19 PROCEDURE — 85027 COMPLETE CBC AUTOMATED: CPT | Performed by: INTERNAL MEDICINE

## 2017-04-19 RX ORDER — BENZONATATE 200 MG/1
200 CAPSULE ORAL
COMMUNITY
End: 2017-07-25

## 2017-04-19 RX ORDER — FUROSEMIDE 40 MG/1
40 TABLET ORAL DAILY
COMMUNITY
End: 2017-06-26 | Stop reason: ALTCHOICE

## 2017-04-21 ENCOUNTER — HOSPITAL ENCOUNTER (OUTPATIENT)
Dept: INFUSION CENTER | Facility: CLINIC | Age: 62
Discharge: HOME | End: 2017-04-21
Payer: MEDICARE

## 2017-04-21 LAB
ANISOCYTOSIS BLD QL SMEAR: PRESENT
BASOPHILS # BLD AUTO: 0.06 THOUSAND/UL (ref 0–0.1)
BASOPHILS NFR MAR MANUAL: 1 % (ref 0–1)
EOSINOPHIL # BLD AUTO: 0.18 THOUSAND/UL (ref 0–0.61)
EOSINOPHIL NFR BLD MANUAL: 3 % (ref 0–6)
ERYTHROCYTE [DISTWIDTH] IN BLOOD BY AUTOMATED COUNT: 24.7 % (ref 11.6–15.1)
HCT VFR BLD AUTO: 31.1 % (ref 36.5–49.3)
HGB BLD-MCNC: 10.4 G/DL (ref 12–17)
LYMPHOCYTES # BLD AUTO: 2.88 THOUSAND/UL (ref 0.6–4.47)
LYMPHOCYTES # BLD AUTO: 48 % (ref 14–44)
MCH RBC QN AUTO: 32 PG (ref 26.8–34.3)
MCHC RBC AUTO-ENTMCNC: 33.4 G/DL (ref 31.4–37.4)
MCV RBC AUTO: 96 FL (ref 82–98)
MONOCYTES # BLD AUTO: 0.24 THOUSAND/UL (ref 0–1.22)
MONOCYTES NFR BLD AUTO: 4 % (ref 4–12)
NEUTS BAND NFR BLD MANUAL: 1 % (ref 0–8)
NEUTS SEG # BLD: 2.64 THOUSAND/UL (ref 1.81–6.82)
NEUTS SEG NFR BLD AUTO: 43 % (ref 43–75)
OVALOCYTES BLD QL SMEAR: PRESENT
PLATELET # BLD AUTO: 167 THOUSANDS/UL (ref 149–390)
PLATELET BLD QL SMEAR: ADEQUATE
PMV BLD AUTO: 6.5 FL (ref 8.9–12.7)
POLYCHROMASIA BLD QL SMEAR: PRESENT
RBC # BLD AUTO: 3.25 MILLION/UL (ref 3.88–5.62)
TOTAL CELLS COUNTED SPEC: 100
WBC # BLD AUTO: 6 THOUSAND/UL (ref 4.31–10.16)
WBC NRBC COR # BLD: 6 THOUSAND/UL (ref 4.31–10.16)

## 2017-04-21 PROCEDURE — 85027 COMPLETE CBC AUTOMATED: CPT | Performed by: INTERNAL MEDICINE

## 2017-04-21 PROCEDURE — 85007 BL SMEAR W/DIFF WBC COUNT: CPT | Performed by: INTERNAL MEDICINE

## 2017-04-21 NOTE — PROGRESS NOTES
Patient here for central line bloodwork ordered by Dr Jacinda Lee  Bloodwork drawn with port access  Port flushed without heparin per protocol  Patient aware of upcoming appointments   Declined AVS

## 2017-04-24 ENCOUNTER — HOSPITAL ENCOUNTER (OUTPATIENT)
Dept: INFUSION CENTER | Facility: CLINIC | Age: 62
Discharge: HOME | End: 2017-04-24
Payer: MEDICARE

## 2017-04-24 DIAGNOSIS — E78.5 HYPERLIPIDEMIA: ICD-10-CM

## 2017-04-24 DIAGNOSIS — C91.10 CHRONIC LYMPHOCYTIC LEUKEMIA OF B-CELL TYPE NOT HAVING ACHIEVED REMISSION (HCC): ICD-10-CM

## 2017-04-24 LAB
ANISOCYTOSIS BLD QL SMEAR: PRESENT
BASOPHILS # BLD AUTO: 0.07 THOUSAND/UL (ref 0–0.1)
BASOPHILS NFR MAR MANUAL: 1 % (ref 0–1)
EOSINOPHIL # BLD AUTO: 0.36 THOUSAND/UL (ref 0–0.61)
EOSINOPHIL NFR BLD MANUAL: 5 % (ref 0–6)
ERYTHROCYTE [DISTWIDTH] IN BLOOD BY AUTOMATED COUNT: 23.1 % (ref 11.6–15.1)
HCT VFR BLD AUTO: 31.7 % (ref 36.5–49.3)
HGB BLD-MCNC: 10.9 G/DL (ref 12–17)
LYMPHOCYTES # BLD AUTO: 3.17 THOUSAND/UL (ref 0.6–4.47)
LYMPHOCYTES # BLD AUTO: 44 % (ref 14–44)
MCH RBC QN AUTO: 32.5 PG (ref 26.8–34.3)
MCHC RBC AUTO-ENTMCNC: 34.2 G/DL (ref 31.4–37.4)
MCV RBC AUTO: 95 FL (ref 82–98)
MONOCYTES # BLD AUTO: 0.22 THOUSAND/UL (ref 0–1.22)
MONOCYTES NFR BLD AUTO: 3 % (ref 4–12)
NEUTS BAND NFR BLD MANUAL: 0 % (ref 0–8)
NEUTS SEG # BLD: 3.38 THOUSAND/UL (ref 1.81–6.82)
NEUTS SEG NFR BLD AUTO: 47 % (ref 43–75)
OVALOCYTES BLD QL SMEAR: PRESENT
PLATELET # BLD AUTO: 139 THOUSANDS/UL (ref 149–390)
PLATELET BLD QL SMEAR: ABNORMAL
PMV BLD AUTO: 6.8 FL (ref 8.9–12.7)
POLYCHROMASIA BLD QL SMEAR: PRESENT
RBC # BLD AUTO: 3.34 MILLION/UL (ref 3.88–5.62)
TOTAL CELLS COUNTED SPEC: 100
WBC # BLD AUTO: 7.2 THOUSAND/UL (ref 4.31–10.16)
WBC NRBC COR # BLD: 7.2 THOUSAND/UL (ref 4.31–10.16)

## 2017-04-24 PROCEDURE — 85007 BL SMEAR W/DIFF WBC COUNT: CPT | Performed by: INTERNAL MEDICINE

## 2017-04-24 PROCEDURE — 85027 COMPLETE CBC AUTOMATED: CPT | Performed by: INTERNAL MEDICINE

## 2017-04-26 ENCOUNTER — HOSPITAL ENCOUNTER (OUTPATIENT)
Dept: INFUSION CENTER | Facility: CLINIC | Age: 62
Discharge: HOME | End: 2017-04-26
Payer: MEDICARE

## 2017-04-26 ENCOUNTER — GENERIC CONVERSION - ENCOUNTER (OUTPATIENT)
Dept: OTHER | Facility: OTHER | Age: 62
End: 2017-04-26

## 2017-04-26 LAB
ALBUMIN SERPL BCP-MCNC: 4.1 G/DL (ref 3.5–5)
ALP SERPL-CCNC: 44 U/L (ref 46–116)
ALT SERPL W P-5'-P-CCNC: <6 U/L (ref 12–78)
ANION GAP SERPL CALCULATED.3IONS-SCNC: 7 MMOL/L (ref 4–13)
ANISOCYTOSIS BLD QL SMEAR: PRESENT
AST SERPL W P-5'-P-CCNC: 18 U/L (ref 5–45)
BILIRUB SERPL-MCNC: 0.9 MG/DL (ref 0.2–1)
BUN SERPL-MCNC: 21 MG/DL (ref 5–25)
CALCIUM SERPL-MCNC: 9.1 MG/DL (ref 8.3–10.1)
CHLORIDE SERPL-SCNC: 105 MMOL/L (ref 100–108)
CO2 SERPL-SCNC: 29 MMOL/L (ref 21–32)
CREAT SERPL-MCNC: 1.19 MG/DL (ref 0.6–1.3)
DACRYOCYTES BLD QL SMEAR: PRESENT
EOSINOPHIL # BLD AUTO: 0.82 THOUSAND/UL (ref 0–0.61)
EOSINOPHIL NFR BLD MANUAL: 10 % (ref 0–6)
ERYTHROCYTE [DISTWIDTH] IN BLOOD BY AUTOMATED COUNT: 22 % (ref 11.6–15.1)
GFR SERPL CREATININE-BSD FRML MDRD: >60 ML/MIN/1.73SQ M
GLUCOSE SERPL-MCNC: 101 MG/DL (ref 65–140)
HCT VFR BLD AUTO: 33.9 % (ref 36.5–49.3)
HGB BLD-MCNC: 11.6 G/DL (ref 12–17)
IGG SERPL-MCNC: 879 MG/DL (ref 700–1600)
LDH SERPL-CCNC: 203 U/L (ref 81–234)
LYMPHOCYTES # BLD AUTO: 3.36 THOUSAND/UL (ref 0.6–4.47)
LYMPHOCYTES # BLD AUTO: 41 % (ref 14–44)
MCH RBC QN AUTO: 32.6 PG (ref 26.8–34.3)
MCHC RBC AUTO-ENTMCNC: 34.3 G/DL (ref 31.4–37.4)
MCV RBC AUTO: 95 FL (ref 82–98)
METAMYELOCYTES NFR BLD MANUAL: 2 % (ref 0–1)
MONOCYTES # BLD AUTO: 0.16 THOUSAND/UL (ref 0–1.22)
MONOCYTES NFR BLD AUTO: 2 % (ref 4–12)
NEUTS BAND NFR BLD MANUAL: 2 % (ref 0–8)
NEUTS SEG # BLD: 3.69 THOUSAND/UL (ref 1.81–6.82)
NEUTS SEG NFR BLD AUTO: 43 % (ref 43–75)
OVALOCYTES BLD QL SMEAR: PRESENT
PLATELET # BLD AUTO: 147 THOUSANDS/UL (ref 149–390)
PLATELET BLD QL SMEAR: ABNORMAL
PMV BLD AUTO: 7.2 FL (ref 8.9–12.7)
POTASSIUM SERPL-SCNC: 4 MMOL/L (ref 3.5–5.3)
PROT SERPL-MCNC: 6.6 G/DL (ref 6.4–8.2)
RBC # BLD AUTO: 3.57 MILLION/UL (ref 3.88–5.62)
RETICS # AUTO: ABNORMAL 10*3/UL (ref 14356–105094)
RETICS # CALC: 4.65 % (ref 0.37–1.87)
SODIUM SERPL-SCNC: 141 MMOL/L (ref 136–145)
TOTAL CELLS COUNTED SPEC: 100
URATE SERPL-MCNC: 6 MG/DL (ref 4.2–8)
WBC # BLD AUTO: 8.2 THOUSAND/UL (ref 4.31–10.16)
WBC NRBC COR # BLD: 8.2 THOUSAND/UL (ref 4.31–10.16)

## 2017-04-26 PROCEDURE — 80053 COMPREHEN METABOLIC PANEL: CPT | Performed by: INTERNAL MEDICINE

## 2017-04-26 PROCEDURE — 83615 LACTATE (LD) (LDH) ENZYME: CPT | Performed by: INTERNAL MEDICINE

## 2017-04-26 PROCEDURE — 85027 COMPLETE CBC AUTOMATED: CPT | Performed by: INTERNAL MEDICINE

## 2017-04-26 PROCEDURE — 82784 ASSAY IGA/IGD/IGG/IGM EACH: CPT | Performed by: INTERNAL MEDICINE

## 2017-04-26 PROCEDURE — 82232 ASSAY OF BETA-2 PROTEIN: CPT | Performed by: INTERNAL MEDICINE

## 2017-04-26 PROCEDURE — 84550 ASSAY OF BLOOD/URIC ACID: CPT | Performed by: INTERNAL MEDICINE

## 2017-04-26 PROCEDURE — 85045 AUTOMATED RETICULOCYTE COUNT: CPT | Performed by: INTERNAL MEDICINE

## 2017-04-26 PROCEDURE — 85007 BL SMEAR W/DIFF WBC COUNT: CPT | Performed by: INTERNAL MEDICINE

## 2017-04-26 NOTE — PLAN OF CARE
Problem: Potential for Falls  Goal: Patient will remain free of falls  INTERVENTIONS:  - Assess patient frequently for physical needs  - Identify cognitive and physical deficits and behaviors that affect risk of falls    - [place] fall precautions as indicated by assessment   - Educate patient/family on patient safety including physical limitations  - Instruct patient to call for assistance with activity based on assessment  - Modify environment to reduce risk of injury  - Consider OT/PT consult to assist with strengthening/mobility   Outcome: Progressing

## 2017-04-27 LAB — B2 MICROGLOB SERPL-MCNC: 4.8 MG/L (ref 0.6–2.4)

## 2017-04-28 ENCOUNTER — HOSPITAL ENCOUNTER (OUTPATIENT)
Dept: INFUSION CENTER | Facility: CLINIC | Age: 62
Discharge: HOME | End: 2017-04-28
Payer: MEDICARE

## 2017-04-28 LAB
ANISOCYTOSIS BLD QL SMEAR: PRESENT
BASOPHILS # BLD AUTO: 0 THOUSAND/UL (ref 0–0.1)
BASOPHILS NFR MAR MANUAL: 0 % (ref 0–1)
DACRYOCYTES BLD QL SMEAR: PRESENT
EOSINOPHIL # BLD AUTO: 0.07 THOUSAND/UL (ref 0–0.61)
EOSINOPHIL NFR BLD MANUAL: 1 % (ref 0–6)
ERYTHROCYTE [DISTWIDTH] IN BLOOD BY AUTOMATED COUNT: 22.7 % (ref 11.6–15.1)
HCT VFR BLD AUTO: 34 % (ref 36.5–49.3)
HGB BLD-MCNC: 11 G/DL (ref 12–17)
LYMPHOCYTES # BLD AUTO: 3.4 THOUSAND/UL (ref 0.6–4.47)
LYMPHOCYTES # BLD AUTO: 46 % (ref 14–44)
MCH RBC QN AUTO: 31.5 PG (ref 26.8–34.3)
MCHC RBC AUTO-ENTMCNC: 32.5 G/DL (ref 31.4–37.4)
MCV RBC AUTO: 97 FL (ref 82–98)
MONOCYTES # BLD AUTO: 0.3 THOUSAND/UL (ref 0–1.22)
MONOCYTES NFR BLD AUTO: 4 % (ref 4–12)
NEUTS BAND NFR BLD MANUAL: 0 % (ref 0–8)
NEUTS SEG # BLD: 3.63 THOUSAND/UL (ref 1.81–6.82)
NEUTS SEG NFR BLD AUTO: 49 % (ref 43–75)
NRBC BLD AUTO-RTO: 1 /100 WBC (ref 0–2)
OVALOCYTES BLD QL SMEAR: PRESENT
PLATELET # BLD AUTO: 119 THOUSANDS/UL (ref 149–390)
PLATELET BLD QL SMEAR: ABNORMAL
PMV BLD AUTO: 6.8 FL (ref 8.9–12.7)
RBC # BLD AUTO: 3.5 MILLION/UL (ref 3.88–5.62)
TOTAL CELLS COUNTED SPEC: 100
WBC # BLD AUTO: 7.4 THOUSAND/UL (ref 4.31–10.16)
WBC NRBC COR # BLD: 7.4 THOUSAND/UL (ref 4.31–10.16)

## 2017-04-28 PROCEDURE — 85027 COMPLETE CBC AUTOMATED: CPT | Performed by: INTERNAL MEDICINE

## 2017-04-28 PROCEDURE — 85007 BL SMEAR W/DIFF WBC COUNT: CPT | Performed by: INTERNAL MEDICINE

## 2017-05-01 ENCOUNTER — HOSPITAL ENCOUNTER (OUTPATIENT)
Dept: INFUSION CENTER | Facility: CLINIC | Age: 62
Discharge: HOME | End: 2017-05-01
Payer: MEDICARE

## 2017-05-01 VITALS
RESPIRATION RATE: 18 BRPM | DIASTOLIC BLOOD PRESSURE: 75 MMHG | SYSTOLIC BLOOD PRESSURE: 142 MMHG | TEMPERATURE: 97.7 F | HEART RATE: 73 BPM

## 2017-05-01 LAB
ANISOCYTOSIS BLD QL SMEAR: PRESENT
BASOPHILS # BLD AUTO: 0.12 THOUSAND/UL (ref 0–0.1)
BASOPHILS NFR MAR MANUAL: 2 % (ref 0–1)
DACRYOCYTES BLD QL SMEAR: PRESENT
EOSINOPHIL # BLD AUTO: 0.65 THOUSAND/UL (ref 0–0.61)
EOSINOPHIL NFR BLD MANUAL: 11 % (ref 0–6)
ERYTHROCYTE [DISTWIDTH] IN BLOOD BY AUTOMATED COUNT: 21.7 % (ref 11.6–15.1)
HCT VFR BLD AUTO: 31.9 % (ref 36.5–49.3)
HGB BLD-MCNC: 10.7 G/DL (ref 12–17)
LYMPHOCYTES # BLD AUTO: 1.89 THOUSAND/UL (ref 0.6–4.47)
LYMPHOCYTES # BLD AUTO: 32 % (ref 14–44)
MCH RBC QN AUTO: 32.2 PG (ref 26.8–34.3)
MCHC RBC AUTO-ENTMCNC: 33.6 G/DL (ref 31.4–37.4)
MCV RBC AUTO: 96 FL (ref 82–98)
MONOCYTES # BLD AUTO: 0.24 THOUSAND/UL (ref 0–1.22)
MONOCYTES NFR BLD AUTO: 4 % (ref 4–12)
NEUTS BAND NFR BLD MANUAL: 2 % (ref 0–8)
NEUTS SEG # BLD: 3.01 THOUSAND/UL (ref 1.81–6.82)
NEUTS SEG NFR BLD AUTO: 49 % (ref 43–75)
OVALOCYTES BLD QL SMEAR: PRESENT
PLATELET # BLD AUTO: 113 THOUSANDS/UL (ref 149–390)
PLATELET BLD QL SMEAR: ABNORMAL
PMV BLD AUTO: 6.8 FL (ref 8.9–12.7)
RBC # BLD AUTO: 3.34 MILLION/UL (ref 3.88–5.62)
TOTAL CELLS COUNTED SPEC: 100
WBC # BLD AUTO: 5.9 THOUSAND/UL (ref 4.31–10.16)
WBC NRBC COR # BLD: 5.9 THOUSAND/UL (ref 4.31–10.16)

## 2017-05-01 PROCEDURE — 85007 BL SMEAR W/DIFF WBC COUNT: CPT | Performed by: INTERNAL MEDICINE

## 2017-05-01 PROCEDURE — 85027 COMPLETE CBC AUTOMATED: CPT | Performed by: INTERNAL MEDICINE

## 2017-05-01 NOTE — PLAN OF CARE
Problem: Potential for Falls  Goal: Patient will remain free of falls  INTERVENTIONS:  - Assess patient frequently for physical needs  - Identify cognitive and physical deficits and behaviors that affect risk of falls    - [place] fall precautions as indicated by assessment   - Educate patient/family on patient safety including physical limitations  - Instruct patient to call for assistance with activity based on assessment  - Modify environment to reduce risk of injury  - Consider OT/PT consult to assist with strengthening/mobility   Outcome: Progressing

## 2017-05-03 ENCOUNTER — HOSPITAL ENCOUNTER (OUTPATIENT)
Dept: INFUSION CENTER | Facility: CLINIC | Age: 62
Discharge: HOME | End: 2017-05-03
Payer: MEDICARE

## 2017-05-03 ENCOUNTER — ALLSCRIPTS OFFICE VISIT (OUTPATIENT)
Dept: OTHER | Facility: OTHER | Age: 62
End: 2017-05-03

## 2017-05-03 LAB
ANISOCYTOSIS BLD QL SMEAR: PRESENT
BASOPHILS # BLD AUTO: 0.17 THOUSAND/UL (ref 0–0.1)
BASOPHILS NFR MAR MANUAL: 3 % (ref 0–1)
DACRYOCYTES BLD QL SMEAR: PRESENT
EOSINOPHIL # BLD AUTO: 0.28 THOUSAND/UL (ref 0–0.61)
EOSINOPHIL NFR BLD MANUAL: 5 % (ref 0–6)
ERYTHROCYTE [DISTWIDTH] IN BLOOD BY AUTOMATED COUNT: 20.8 % (ref 11.6–15.1)
HCT VFR BLD AUTO: 31.9 % (ref 36.5–49.3)
HGB BLD-MCNC: 10.7 G/DL (ref 12–17)
LYMPHOCYTES # BLD AUTO: 1.82 THOUSAND/UL (ref 0.6–4.47)
LYMPHOCYTES # BLD AUTO: 33 % (ref 14–44)
MCH RBC QN AUTO: 32.3 PG (ref 26.8–34.3)
MCHC RBC AUTO-ENTMCNC: 33.6 G/DL (ref 31.4–37.4)
MCV RBC AUTO: 96 FL (ref 82–98)
MONOCYTES # BLD AUTO: 0.28 THOUSAND/UL (ref 0–1.22)
MONOCYTES NFR BLD AUTO: 5 % (ref 4–12)
NEUTS BAND NFR BLD MANUAL: 3 % (ref 0–8)
NEUTS SEG # BLD: 2.97 THOUSAND/UL (ref 1.81–6.82)
NEUTS SEG NFR BLD AUTO: 51 % (ref 43–75)
OVALOCYTES BLD QL SMEAR: PRESENT
PLATELET # BLD AUTO: 113 THOUSANDS/UL (ref 149–390)
PLATELET BLD QL SMEAR: ABNORMAL
PMV BLD AUTO: 7.6 FL (ref 8.9–12.7)
RBC # BLD AUTO: 3.31 MILLION/UL (ref 3.88–5.62)
TOTAL CELLS COUNTED SPEC: 100
WBC # BLD AUTO: 5.5 THOUSAND/UL (ref 4.31–10.16)
WBC NRBC COR # BLD: 5.5 THOUSAND/UL (ref 4.31–10.16)

## 2017-05-03 PROCEDURE — 85027 COMPLETE CBC AUTOMATED: CPT | Performed by: INTERNAL MEDICINE

## 2017-05-03 PROCEDURE — 85007 BL SMEAR W/DIFF WBC COUNT: CPT | Performed by: INTERNAL MEDICINE

## 2017-05-03 NOTE — PROGRESS NOTES
Pt here for port flush with central labs  Tolerated well  Pt declined AVS  Aware of all future appointments

## 2017-05-04 ENCOUNTER — GENERIC CONVERSION - ENCOUNTER (OUTPATIENT)
Dept: OTHER | Facility: OTHER | Age: 62
End: 2017-05-04

## 2017-05-05 ENCOUNTER — HOSPITAL ENCOUNTER (OUTPATIENT)
Dept: INFUSION CENTER | Facility: CLINIC | Age: 62
Discharge: HOME | End: 2017-05-05
Payer: MEDICARE

## 2017-05-05 LAB
ANISOCYTOSIS BLD QL SMEAR: PRESENT
BASOPHILS # BLD AUTO: 0.05 THOUSAND/UL (ref 0–0.1)
BASOPHILS NFR MAR MANUAL: 1 % (ref 0–1)
DACRYOCYTES BLD QL SMEAR: PRESENT
EOSINOPHIL # BLD AUTO: 0.43 THOUSAND/UL (ref 0–0.61)
EOSINOPHIL NFR BLD MANUAL: 9 % (ref 0–6)
ERYTHROCYTE [DISTWIDTH] IN BLOOD BY AUTOMATED COUNT: 21.2 % (ref 11.6–15.1)
HCT VFR BLD AUTO: 32.7 % (ref 36.5–49.3)
HGB BLD-MCNC: 10.9 G/DL (ref 12–17)
LYMPHOCYTES # BLD AUTO: 1.34 THOUSAND/UL (ref 0.6–4.47)
LYMPHOCYTES # BLD AUTO: 28 % (ref 14–44)
MCH RBC QN AUTO: 32.2 PG (ref 26.8–34.3)
MCHC RBC AUTO-ENTMCNC: 33.3 G/DL (ref 31.4–37.4)
MCV RBC AUTO: 97 FL (ref 82–98)
MONOCYTES # BLD AUTO: 0.1 THOUSAND/UL (ref 0–1.22)
MONOCYTES NFR BLD AUTO: 2 % (ref 4–12)
NEUTS BAND NFR BLD MANUAL: 2 % (ref 0–8)
NEUTS SEG # BLD: 2.88 THOUSAND/UL (ref 1.81–6.82)
NEUTS SEG NFR BLD AUTO: 58 % (ref 43–75)
PLATELET # BLD AUTO: 101 THOUSANDS/UL (ref 149–390)
PLATELET BLD QL SMEAR: ABNORMAL
PMV BLD AUTO: 7.5 FL (ref 8.9–12.7)
RBC # BLD AUTO: 3.38 MILLION/UL (ref 3.88–5.62)
TOTAL CELLS COUNTED SPEC: 100
WBC # BLD AUTO: 4.8 THOUSAND/UL (ref 4.31–10.16)
WBC NRBC COR # BLD: 4.8 THOUSAND/UL (ref 4.31–10.16)

## 2017-05-05 PROCEDURE — 85027 COMPLETE CBC AUTOMATED: CPT | Performed by: INTERNAL MEDICINE

## 2017-05-05 PROCEDURE — 85007 BL SMEAR W/DIFF WBC COUNT: CPT | Performed by: INTERNAL MEDICINE

## 2017-05-05 NOTE — PROGRESS NOTES
Pt without complaint, blood work drawn from pt's PAC, PAC flushed per protocol with NSS only  Pt left unit in stable condition without question or concern, declines AVS today

## 2017-05-08 ENCOUNTER — HOSPITAL ENCOUNTER (OUTPATIENT)
Dept: INFUSION CENTER | Facility: CLINIC | Age: 62
Discharge: HOME | End: 2017-05-08
Payer: MEDICARE

## 2017-05-08 LAB
ANISOCYTOSIS BLD QL SMEAR: PRESENT
BASOPHILS # BLD AUTO: 0.05 THOUSAND/UL (ref 0–0.1)
BASOPHILS NFR MAR MANUAL: 1 % (ref 0–1)
DACRYOCYTES BLD QL SMEAR: PRESENT
EOSINOPHIL # BLD AUTO: 0.42 THOUSAND/UL (ref 0–0.61)
EOSINOPHIL NFR BLD MANUAL: 9 % (ref 0–6)
ERYTHROCYTE [DISTWIDTH] IN BLOOD BY AUTOMATED COUNT: 19.4 % (ref 11.6–15.1)
HCT VFR BLD AUTO: 32 % (ref 36.5–49.3)
HGB BLD-MCNC: 10.9 G/DL (ref 12–17)
LYMPHOCYTES # BLD AUTO: 1.36 THOUSAND/UL (ref 0.6–4.47)
LYMPHOCYTES # BLD AUTO: 29 % (ref 14–44)
MCH RBC QN AUTO: 32.6 PG (ref 26.8–34.3)
MCHC RBC AUTO-ENTMCNC: 34 G/DL (ref 31.4–37.4)
MCV RBC AUTO: 96 FL (ref 82–98)
MONOCYTES # BLD AUTO: 0.28 THOUSAND/UL (ref 0–1.22)
MONOCYTES NFR BLD AUTO: 6 % (ref 4–12)
NEUTS BAND NFR BLD MANUAL: 0 % (ref 0–8)
NEUTS SEG # BLD: 2.54 THOUSAND/UL (ref 1.81–6.82)
NEUTS SEG NFR BLD AUTO: 54 % (ref 43–75)
OVALOCYTES BLD QL SMEAR: PRESENT
PLATELET # BLD AUTO: 102 THOUSANDS/UL (ref 149–390)
PLATELET BLD QL SMEAR: ABNORMAL
PMV BLD AUTO: 6.5 FL (ref 8.9–12.7)
RBC # BLD AUTO: 3.33 MILLION/UL (ref 3.88–5.62)
TOTAL CELLS COUNTED SPEC: 100
VARIANT LYMPHS # BLD AUTO: 1 % (ref 0–0)
WBC # BLD AUTO: 4.7 THOUSAND/UL (ref 4.31–10.16)
WBC NRBC COR # BLD: 4.7 THOUSAND/UL (ref 4.31–10.16)

## 2017-05-08 PROCEDURE — 85027 COMPLETE CBC AUTOMATED: CPT | Performed by: INTERNAL MEDICINE

## 2017-05-08 PROCEDURE — 85007 BL SMEAR W/DIFF WBC COUNT: CPT | Performed by: INTERNAL MEDICINE

## 2017-05-08 NOTE — PROGRESS NOTES
Patient here for central line blood work ordered by Dr Jo Ann Nails  Patient offers no complaints  Blood work drawn with port access  Port flushed per protocol without heparin due to patient allergy  Patient aware of next appointment   Declined AVS

## 2017-05-11 ENCOUNTER — GENERIC CONVERSION - ENCOUNTER (OUTPATIENT)
Dept: OTHER | Facility: OTHER | Age: 62
End: 2017-05-11

## 2017-05-11 ENCOUNTER — HOSPITAL ENCOUNTER (OUTPATIENT)
Dept: INFUSION CENTER | Facility: CLINIC | Age: 62
Discharge: HOME | End: 2017-05-11
Payer: MEDICARE

## 2017-05-11 LAB
ANISOCYTOSIS BLD QL SMEAR: PRESENT
BASOPHILS # BLD AUTO: 0.04 THOUSAND/UL (ref 0–0.1)
BASOPHILS NFR MAR MANUAL: 1 % (ref 0–1)
DACRYOCYTES BLD QL SMEAR: PRESENT
EOSINOPHIL # BLD AUTO: 0.08 THOUSAND/UL (ref 0–0.61)
EOSINOPHIL NFR BLD MANUAL: 2 % (ref 0–6)
ERYTHROCYTE [DISTWIDTH] IN BLOOD BY AUTOMATED COUNT: 19.1 % (ref 11.6–15.1)
HCT VFR BLD AUTO: 32.2 % (ref 36.5–49.3)
HGB BLD-MCNC: 10.8 G/DL (ref 12–17)
LYMPHOCYTES # BLD AUTO: 1.92 THOUSAND/UL (ref 0.6–4.47)
LYMPHOCYTES # BLD AUTO: 48 % (ref 14–44)
MCH RBC QN AUTO: 32.8 PG (ref 26.8–34.3)
MCHC RBC AUTO-ENTMCNC: 33.7 G/DL (ref 31.4–37.4)
MCV RBC AUTO: 97 FL (ref 82–98)
MONOCYTES # BLD AUTO: 0.08 THOUSAND/UL (ref 0–1.22)
MONOCYTES NFR BLD AUTO: 2 % (ref 4–12)
NEUTS BAND NFR BLD MANUAL: 0 % (ref 0–8)
NEUTS SEG # BLD: 1.88 THOUSAND/UL (ref 1.81–6.82)
NEUTS SEG NFR BLD AUTO: 47 % (ref 43–75)
OVALOCYTES BLD QL SMEAR: PRESENT
PLATELET # BLD AUTO: 92 THOUSANDS/UL (ref 149–390)
PLATELET BLD QL SMEAR: ABNORMAL
PMV BLD AUTO: 6.6 FL (ref 8.9–12.7)
RBC # BLD AUTO: 3.31 MILLION/UL (ref 3.88–5.62)
TOTAL CELLS COUNTED SPEC: 100
WBC # BLD AUTO: 4 THOUSAND/UL (ref 4.31–10.16)
WBC NRBC COR # BLD: 4 THOUSAND/UL (ref 4.31–10.16)

## 2017-05-11 PROCEDURE — 85027 COMPLETE CBC AUTOMATED: CPT | Performed by: INTERNAL MEDICINE

## 2017-05-11 PROCEDURE — 85007 BL SMEAR W/DIFF WBC COUNT: CPT | Performed by: INTERNAL MEDICINE

## 2017-05-15 ENCOUNTER — HOSPITAL ENCOUNTER (OUTPATIENT)
Dept: INFUSION CENTER | Facility: CLINIC | Age: 62
Discharge: HOME | End: 2017-05-15
Payer: MEDICARE

## 2017-05-15 VITALS
SYSTOLIC BLOOD PRESSURE: 128 MMHG | HEART RATE: 71 BPM | TEMPERATURE: 98 F | DIASTOLIC BLOOD PRESSURE: 60 MMHG | RESPIRATION RATE: 18 BRPM

## 2017-05-15 LAB
ANISOCYTOSIS BLD QL SMEAR: PRESENT
BASOPHILS # BLD AUTO: 0.09 THOUSAND/UL (ref 0–0.1)
BASOPHILS NFR MAR MANUAL: 2 % (ref 0–1)
DACRYOCYTES BLD QL SMEAR: PRESENT
EOSINOPHIL # BLD AUTO: 0.28 THOUSAND/UL (ref 0–0.61)
EOSINOPHIL NFR BLD MANUAL: 6 % (ref 0–6)
ERYTHROCYTE [DISTWIDTH] IN BLOOD BY AUTOMATED COUNT: 17.8 % (ref 11.6–15.1)
HCT VFR BLD AUTO: 34.3 % (ref 36.5–49.3)
HGB BLD-MCNC: 11.5 G/DL (ref 12–17)
LYMPHOCYTES # BLD AUTO: 2.02 THOUSAND/UL (ref 0.6–4.47)
LYMPHOCYTES # BLD AUTO: 43 % (ref 14–44)
MCH RBC QN AUTO: 32.9 PG (ref 26.8–34.3)
MCHC RBC AUTO-ENTMCNC: 33.6 G/DL (ref 31.4–37.4)
MCV RBC AUTO: 98 FL (ref 82–98)
MONOCYTES # BLD AUTO: 0.33 THOUSAND/UL (ref 0–1.22)
MONOCYTES NFR BLD AUTO: 7 % (ref 4–12)
NEUTS BAND NFR BLD MANUAL: 2 % (ref 0–8)
NEUTS SEG # BLD: 1.97 THOUSAND/UL (ref 1.81–6.82)
NEUTS SEG NFR BLD AUTO: 40 % (ref 43–75)
OVALOCYTES BLD QL SMEAR: PRESENT
PLATELET # BLD AUTO: 95 THOUSANDS/UL (ref 149–390)
PLATELET BLD QL SMEAR: ABNORMAL
PMV BLD AUTO: 6.6 FL (ref 8.9–12.7)
RBC # BLD AUTO: 3.51 MILLION/UL (ref 3.88–5.62)
TOTAL CELLS COUNTED SPEC: 100
WBC # BLD AUTO: 4.7 THOUSAND/UL (ref 4.31–10.16)
WBC NRBC COR # BLD: 4.7 THOUSAND/UL (ref 4.31–10.16)

## 2017-05-15 PROCEDURE — 85027 COMPLETE CBC AUTOMATED: CPT | Performed by: INTERNAL MEDICINE

## 2017-05-15 PROCEDURE — 85007 BL SMEAR W/DIFF WBC COUNT: CPT | Performed by: INTERNAL MEDICINE

## 2017-05-18 ENCOUNTER — HOSPITAL ENCOUNTER (OUTPATIENT)
Dept: INFUSION CENTER | Facility: CLINIC | Age: 62
Discharge: HOME | End: 2017-05-18
Payer: MEDICARE

## 2017-05-18 LAB
ANISOCYTOSIS BLD QL SMEAR: PRESENT
BASOPHILS # BLD AUTO: 0 THOUSAND/UL (ref 0–0.1)
BASOPHILS NFR MAR MANUAL: 0 % (ref 0–1)
DACRYOCYTES BLD QL SMEAR: PRESENT
EOSINOPHIL # BLD AUTO: 0.05 THOUSAND/UL (ref 0–0.61)
EOSINOPHIL NFR BLD MANUAL: 1 % (ref 0–6)
ERYTHROCYTE [DISTWIDTH] IN BLOOD BY AUTOMATED COUNT: 17.5 % (ref 11.6–15.1)
HCT VFR BLD AUTO: 34.2 % (ref 36.5–49.3)
HGB BLD-MCNC: 11.6 G/DL (ref 12–17)
LYMPHOCYTES # BLD AUTO: 2.54 THOUSAND/UL (ref 0.6–4.47)
LYMPHOCYTES # BLD AUTO: 48 % (ref 14–44)
MCH RBC QN AUTO: 33 PG (ref 26.8–34.3)
MCHC RBC AUTO-ENTMCNC: 34 G/DL (ref 31.4–37.4)
MCV RBC AUTO: 97 FL (ref 82–98)
MONOCYTES # BLD AUTO: 0.37 THOUSAND/UL (ref 0–1.22)
MONOCYTES NFR BLD AUTO: 7 % (ref 4–12)
NEUTS BAND NFR BLD MANUAL: 2 % (ref 0–8)
NEUTS SEG # BLD: 2.33 THOUSAND/UL (ref 1.81–6.82)
NEUTS SEG NFR BLD AUTO: 42 % (ref 43–75)
OVALOCYTES BLD QL SMEAR: PRESENT
PLATELET # BLD AUTO: 88 THOUSANDS/UL (ref 149–390)
PLATELET BLD QL SMEAR: ABNORMAL
PMV BLD AUTO: 6.2 FL (ref 8.9–12.7)
RBC # BLD AUTO: 3.53 MILLION/UL (ref 3.88–5.62)
TOTAL CELLS COUNTED SPEC: 100
WBC # BLD AUTO: 5.3 THOUSAND/UL (ref 4.31–10.16)
WBC NRBC COR # BLD: 5.3 THOUSAND/UL (ref 4.31–10.16)

## 2017-05-18 PROCEDURE — 85027 COMPLETE CBC AUTOMATED: CPT | Performed by: INTERNAL MEDICINE

## 2017-05-18 PROCEDURE — 85007 BL SMEAR W/DIFF WBC COUNT: CPT | Performed by: INTERNAL MEDICINE

## 2017-05-18 NOTE — PROGRESS NOTES
Pt here for central labs  Drawn and flushed per protocol  Pt aware of next appointment, AVS declined

## 2017-05-22 ENCOUNTER — HOSPITAL ENCOUNTER (OUTPATIENT)
Dept: INFUSION CENTER | Facility: CLINIC | Age: 62
Discharge: HOME | End: 2017-05-22
Payer: MEDICARE

## 2017-05-22 VITALS
TEMPERATURE: 98.4 F | DIASTOLIC BLOOD PRESSURE: 55 MMHG | SYSTOLIC BLOOD PRESSURE: 141 MMHG | HEART RATE: 67 BPM | RESPIRATION RATE: 18 BRPM

## 2017-05-22 LAB
ALBUMIN SERPL BCP-MCNC: 4.2 G/DL (ref 3.5–5)
ALP SERPL-CCNC: 38 U/L (ref 46–116)
ALT SERPL W P-5'-P-CCNC: <6 U/L (ref 12–78)
ANION GAP SERPL CALCULATED.3IONS-SCNC: 8 MMOL/L (ref 4–13)
ANISOCYTOSIS BLD QL SMEAR: PRESENT
AST SERPL W P-5'-P-CCNC: 25 U/L (ref 5–45)
BASOPHILS # BLD AUTO: 0 THOUSAND/UL (ref 0–0.1)
BASOPHILS NFR MAR MANUAL: 0 % (ref 0–1)
BILIRUB SERPL-MCNC: 0.9 MG/DL (ref 0.2–1)
BUN SERPL-MCNC: 18 MG/DL (ref 5–25)
CALCIUM SERPL-MCNC: 9.1 MG/DL (ref 8.3–10.1)
CHLORIDE SERPL-SCNC: 105 MMOL/L (ref 100–108)
CO2 SERPL-SCNC: 25 MMOL/L (ref 21–32)
CREAT SERPL-MCNC: 1.31 MG/DL (ref 0.6–1.3)
EOSINOPHIL # BLD AUTO: 0.16 THOUSAND/UL (ref 0–0.61)
EOSINOPHIL NFR BLD MANUAL: 3 % (ref 0–6)
ERYTHROCYTE [DISTWIDTH] IN BLOOD BY AUTOMATED COUNT: 16.9 % (ref 11.6–15.1)
GFR SERPL CREATININE-BSD FRML MDRD: 55.4 ML/MIN/1.73SQ M
GLUCOSE SERPL-MCNC: 114 MG/DL (ref 65–140)
HCT VFR BLD AUTO: 31.1 % (ref 36.5–49.3)
HGB BLD-MCNC: 11.5 G/DL (ref 12–17)
IGG SERPL-MCNC: 552 MG/DL (ref 700–1600)
LDH SERPL-CCNC: 207 U/L (ref 81–234)
LYMPHOCYTES # BLD AUTO: 2.81 THOUSAND/UL (ref 0.6–4.47)
LYMPHOCYTES # BLD AUTO: 52 % (ref 14–44)
MCH RBC QN AUTO: 32.4 PG (ref 26.8–34.3)
MCHC RBC AUTO-ENTMCNC: 33.4 G/DL (ref 31.4–37.4)
MCV RBC AUTO: 98 FL (ref 82–98)
MONOCYTES # BLD AUTO: 0.11 THOUSAND/UL (ref 0–1.22)
MONOCYTES NFR BLD AUTO: 2 % (ref 4–12)
NEUTS BAND NFR BLD MANUAL: 1 % (ref 0–8)
NEUTS SEG # BLD: 2.32 THOUSAND/UL (ref 1.81–6.82)
NEUTS SEG NFR BLD AUTO: 42 % (ref 43–75)
PLATELET # BLD AUTO: 83 THOUSANDS/UL (ref 149–390)
PLATELET BLD QL SMEAR: ABNORMAL
PMV BLD AUTO: 7.1 FL (ref 8.9–12.7)
POTASSIUM SERPL-SCNC: 4.4 MMOL/L (ref 3.5–5.3)
PROT SERPL-MCNC: 5.5 G/DL (ref 6.4–8.2)
RBC # BLD AUTO: 3.18 MILLION/UL (ref 3.88–5.62)
RETICS # AUTO: ABNORMAL 10*3/UL (ref 14356–105094)
RETICS # CALC: 3.11 % (ref 0.37–1.87)
SODIUM SERPL-SCNC: 138 MMOL/L (ref 136–145)
TOTAL CELLS COUNTED SPEC: 100
URATE SERPL-MCNC: 6.2 MG/DL (ref 4.2–8)
WBC # BLD AUTO: 5.4 THOUSAND/UL (ref 4.31–10.16)
WBC NRBC COR # BLD: 5.4 THOUSAND/UL (ref 4.31–10.16)

## 2017-05-22 PROCEDURE — 80053 COMPREHEN METABOLIC PANEL: CPT | Performed by: INTERNAL MEDICINE

## 2017-05-22 PROCEDURE — 85045 AUTOMATED RETICULOCYTE COUNT: CPT | Performed by: INTERNAL MEDICINE

## 2017-05-22 PROCEDURE — 84550 ASSAY OF BLOOD/URIC ACID: CPT | Performed by: INTERNAL MEDICINE

## 2017-05-22 PROCEDURE — 83615 LACTATE (LD) (LDH) ENZYME: CPT | Performed by: INTERNAL MEDICINE

## 2017-05-22 PROCEDURE — 85027 COMPLETE CBC AUTOMATED: CPT | Performed by: INTERNAL MEDICINE

## 2017-05-22 PROCEDURE — 82784 ASSAY IGA/IGD/IGG/IGM EACH: CPT | Performed by: INTERNAL MEDICINE

## 2017-05-22 PROCEDURE — 82232 ASSAY OF BETA-2 PROTEIN: CPT | Performed by: INTERNAL MEDICINE

## 2017-05-22 PROCEDURE — 85007 BL SMEAR W/DIFF WBC COUNT: CPT | Performed by: INTERNAL MEDICINE

## 2017-05-22 NOTE — PROGRESS NOTES
Patient arrived for labs to be drawn from patient port  Patient port accessed without complaint or difficult, good blood return noted  Pt d/angela from infusion center without discomfort or complaint   Pt aware of future appointments

## 2017-05-23 LAB — B2 MICROGLOB SERPL-MCNC: 5.4 MG/L (ref 0.6–2.4)

## 2017-05-24 ENCOUNTER — ALLSCRIPTS OFFICE VISIT (OUTPATIENT)
Dept: OTHER | Facility: OTHER | Age: 62
End: 2017-05-24

## 2017-05-29 DIAGNOSIS — C91.10 CHRONIC LYMPHOCYTIC LEUKEMIA OF B-CELL TYPE NOT HAVING ACHIEVED REMISSION (HCC): ICD-10-CM

## 2017-05-30 ENCOUNTER — HOSPITAL ENCOUNTER (OUTPATIENT)
Dept: INFUSION CENTER | Facility: CLINIC | Age: 62
Discharge: HOME | End: 2017-05-30
Payer: MEDICARE

## 2017-05-30 LAB
ANISOCYTOSIS BLD QL SMEAR: PRESENT
BASOPHILS # BLD AUTO: 0 THOUSAND/UL (ref 0–0.1)
BASOPHILS NFR MAR MANUAL: 0 % (ref 0–1)
DACRYOCYTES BLD QL SMEAR: PRESENT
EOSINOPHIL # BLD AUTO: 0.04 THOUSAND/UL (ref 0–0.61)
EOSINOPHIL NFR BLD MANUAL: 1 % (ref 0–6)
ERYTHROCYTE [DISTWIDTH] IN BLOOD BY AUTOMATED COUNT: 15.7 % (ref 11.6–15.1)
HCT VFR BLD AUTO: 31.7 % (ref 36.5–49.3)
HGB BLD-MCNC: 10.6 G/DL (ref 12–17)
LYMPHOCYTES # BLD AUTO: 2 THOUSAND/UL (ref 0.6–4.47)
LYMPHOCYTES # BLD AUTO: 57 % (ref 14–44)
MCH RBC QN AUTO: 32.3 PG (ref 26.8–34.3)
MCHC RBC AUTO-ENTMCNC: 33.5 G/DL (ref 31.4–37.4)
MCV RBC AUTO: 96 FL (ref 82–98)
MONOCYTES # BLD AUTO: 0.14 THOUSAND/UL (ref 0–1.22)
MONOCYTES NFR BLD AUTO: 4 % (ref 4–12)
NEUTS BAND NFR BLD MANUAL: 0 % (ref 0–8)
NEUTS SEG # BLD: 1.33 THOUSAND/UL (ref 1.81–6.82)
NEUTS SEG NFR BLD AUTO: 38 % (ref 43–75)
OVALOCYTES BLD QL SMEAR: PRESENT
PLATELET # BLD AUTO: 64 THOUSANDS/UL (ref 149–390)
PMV BLD AUTO: 7.7 FL (ref 8.9–12.7)
RBC # BLD AUTO: 3.29 MILLION/UL (ref 3.88–5.62)
TOTAL CELLS COUNTED SPEC: 100
WBC # BLD AUTO: 3.5 THOUSAND/UL (ref 4.31–10.16)
WBC NRBC COR # BLD: 3.5 THOUSAND/UL (ref 4.31–10.16)

## 2017-05-30 PROCEDURE — 85007 BL SMEAR W/DIFF WBC COUNT: CPT | Performed by: INTERNAL MEDICINE

## 2017-05-30 PROCEDURE — 85027 COMPLETE CBC AUTOMATED: CPT | Performed by: INTERNAL MEDICINE

## 2017-05-30 NOTE — PROGRESS NOTES
Pt here for central labs  Clarified orders with Dr Donovan Marie nurse, Bri Oconnor  Pt will now come for cbc with diff once per week with more lab specimens to be drawn monthly  Pt saline locked per protocol  Declined AVS  Left without question or concerns

## 2017-05-31 ENCOUNTER — GENERIC CONVERSION - ENCOUNTER (OUTPATIENT)
Dept: OTHER | Facility: OTHER | Age: 62
End: 2017-05-31

## 2017-06-02 ENCOUNTER — GENERIC CONVERSION - ENCOUNTER (OUTPATIENT)
Dept: OTHER | Facility: OTHER | Age: 62
End: 2017-06-02

## 2017-06-05 ENCOUNTER — HOSPITAL ENCOUNTER (OUTPATIENT)
Dept: INFUSION CENTER | Facility: CLINIC | Age: 62
Discharge: HOME | End: 2017-06-05
Payer: MEDICARE

## 2017-06-05 ENCOUNTER — GENERIC CONVERSION - ENCOUNTER (OUTPATIENT)
Dept: OTHER | Facility: OTHER | Age: 62
End: 2017-06-05

## 2017-06-05 LAB
ANISOCYTOSIS BLD QL SMEAR: PRESENT
BASOPHILS # BLD AUTO: 0.11 THOUSAND/UL (ref 0–0.1)
BASOPHILS NFR MAR MANUAL: 2 % (ref 0–1)
DACRYOCYTES BLD QL SMEAR: PRESENT
EOSINOPHIL # BLD AUTO: 0.21 THOUSAND/UL (ref 0–0.61)
EOSINOPHIL NFR BLD MANUAL: 4 % (ref 0–6)
ERYTHROCYTE [DISTWIDTH] IN BLOOD BY AUTOMATED COUNT: 15.6 % (ref 11.6–15.1)
HCT VFR BLD AUTO: 33.8 % (ref 36.5–49.3)
HGB BLD-MCNC: 11.5 G/DL (ref 12–17)
LYMPHOCYTES # BLD AUTO: 3.07 THOUSAND/UL (ref 0.6–4.47)
LYMPHOCYTES # BLD AUTO: 58 % (ref 14–44)
MCH RBC QN AUTO: 33.1 PG (ref 26.8–34.3)
MCHC RBC AUTO-ENTMCNC: 34.1 G/DL (ref 31.4–37.4)
MCV RBC AUTO: 97 FL (ref 82–98)
MONOCYTES # BLD AUTO: 0.11 THOUSAND/UL (ref 0–1.22)
MONOCYTES NFR BLD AUTO: 2 % (ref 4–12)
NEUTS BAND NFR BLD MANUAL: 1 % (ref 0–8)
NEUTS SEG # BLD: 1.7 THOUSAND/UL (ref 1.81–6.82)
NEUTS SEG NFR BLD AUTO: 31 % (ref 43–75)
OVALOCYTES BLD QL SMEAR: PRESENT
PLATELET # BLD AUTO: 66 THOUSANDS/UL (ref 149–390)
PLATELET BLD QL SMEAR: ABNORMAL
PMV BLD AUTO: 6.5 FL (ref 8.9–12.7)
RBC # BLD AUTO: 3.48 MILLION/UL (ref 3.88–5.62)
TOTAL CELLS COUNTED SPEC: 100
VARIANT LYMPHS # BLD AUTO: 2 % (ref 0–0)
WBC # BLD AUTO: 5.3 THOUSAND/UL (ref 4.31–10.16)
WBC NRBC COR # BLD: 5.3 THOUSAND/UL (ref 4.31–10.16)

## 2017-06-05 PROCEDURE — 85007 BL SMEAR W/DIFF WBC COUNT: CPT | Performed by: INTERNAL MEDICINE

## 2017-06-05 PROCEDURE — 85027 COMPLETE CBC AUTOMATED: CPT | Performed by: INTERNAL MEDICINE

## 2017-06-12 ENCOUNTER — GENERIC CONVERSION - ENCOUNTER (OUTPATIENT)
Dept: OTHER | Facility: OTHER | Age: 62
End: 2017-06-12

## 2017-06-12 ENCOUNTER — HOSPITAL ENCOUNTER (OUTPATIENT)
Dept: INFUSION CENTER | Facility: CLINIC | Age: 62
Discharge: HOME | End: 2017-06-12
Payer: MEDICARE

## 2017-06-12 LAB
ANISOCYTOSIS BLD QL SMEAR: PRESENT
BASOPHILS # BLD AUTO: 0 THOUSAND/UL (ref 0–0.1)
BASOPHILS NFR MAR MANUAL: 0 % (ref 0–1)
DACRYOCYTES BLD QL SMEAR: PRESENT
EOSINOPHIL # BLD AUTO: 0.15 THOUSAND/UL (ref 0–0.61)
EOSINOPHIL NFR BLD MANUAL: 2 % (ref 0–6)
ERYTHROCYTE [DISTWIDTH] IN BLOOD BY AUTOMATED COUNT: 15.2 % (ref 11.6–15.1)
HCT VFR BLD AUTO: 35.9 % (ref 36.5–49.3)
HGB BLD-MCNC: 11.8 G/DL (ref 12–17)
LYMPHOCYTES # BLD AUTO: 4.39 THOUSAND/UL (ref 0.6–4.47)
LYMPHOCYTES # BLD AUTO: 57 % (ref 14–44)
MCH RBC QN AUTO: 31.5 PG (ref 26.8–34.3)
MCHC RBC AUTO-ENTMCNC: 32.9 G/DL (ref 31.4–37.4)
MCV RBC AUTO: 96 FL (ref 82–98)
MONOCYTES # BLD AUTO: 0.15 THOUSAND/UL (ref 0–1.22)
MONOCYTES NFR BLD AUTO: 2 % (ref 4–12)
NEUTS BAND NFR BLD MANUAL: 2 % (ref 0–8)
NEUTS SEG # BLD: 3 THOUSAND/UL (ref 1.81–6.82)
NEUTS SEG NFR BLD AUTO: 37 % (ref 43–75)
OVALOCYTES BLD QL SMEAR: PRESENT
PLATELET # BLD AUTO: 110 THOUSANDS/UL (ref 149–390)
PLATELET BLD QL SMEAR: ABNORMAL
PMV BLD AUTO: 6.4 FL (ref 8.9–12.7)
RBC # BLD AUTO: 3.75 MILLION/UL (ref 3.88–5.62)
TOTAL CELLS COUNTED SPEC: 100
WBC # BLD AUTO: 7.7 THOUSAND/UL (ref 4.31–10.16)
WBC NRBC COR # BLD: 7.7 THOUSAND/UL (ref 4.31–10.16)

## 2017-06-12 PROCEDURE — 85007 BL SMEAR W/DIFF WBC COUNT: CPT | Performed by: INTERNAL MEDICINE

## 2017-06-12 PROCEDURE — 85027 COMPLETE CBC AUTOMATED: CPT | Performed by: INTERNAL MEDICINE

## 2017-06-19 ENCOUNTER — HOSPITAL ENCOUNTER (OUTPATIENT)
Dept: INFUSION CENTER | Facility: CLINIC | Age: 62
Discharge: HOME | End: 2017-06-19
Payer: MEDICARE

## 2017-06-19 VITALS
HEART RATE: 79 BPM | TEMPERATURE: 97.6 F | SYSTOLIC BLOOD PRESSURE: 150 MMHG | RESPIRATION RATE: 16 BRPM | DIASTOLIC BLOOD PRESSURE: 76 MMHG

## 2017-06-19 LAB
ALBUMIN SERPL BCP-MCNC: 4.2 G/DL (ref 3.5–5)
ALP SERPL-CCNC: 40 U/L (ref 46–116)
ALT SERPL W P-5'-P-CCNC: <6 U/L (ref 12–78)
ANION GAP SERPL CALCULATED.3IONS-SCNC: 8 MMOL/L (ref 4–13)
ANISOCYTOSIS BLD QL SMEAR: PRESENT
AST SERPL W P-5'-P-CCNC: 13 U/L (ref 5–45)
BASOPHILS # BLD AUTO: 0.07 THOUSAND/UL (ref 0–0.1)
BASOPHILS NFR MAR MANUAL: 2 % (ref 0–1)
BILIRUB SERPL-MCNC: 1 MG/DL (ref 0.2–1)
BUN SERPL-MCNC: 22 MG/DL (ref 5–25)
CALCIUM SERPL-MCNC: 9.2 MG/DL (ref 8.3–10.1)
CHLORIDE SERPL-SCNC: 108 MMOL/L (ref 100–108)
CHOLEST SERPL-MCNC: 138 MG/DL (ref 50–200)
CO2 SERPL-SCNC: 25 MMOL/L (ref 21–32)
CREAT SERPL-MCNC: 1.1 MG/DL (ref 0.6–1.3)
DACRYOCYTES BLD QL SMEAR: PRESENT
EOSINOPHIL # BLD AUTO: 0.26 THOUSAND/UL (ref 0–0.61)
EOSINOPHIL NFR BLD MANUAL: 7 % (ref 0–6)
ERYTHROCYTE [DISTWIDTH] IN BLOOD BY AUTOMATED COUNT: 14.5 % (ref 11.6–15.1)
GFR SERPL CREATININE-BSD FRML MDRD: >60 ML/MIN/1.73SQ M
GLUCOSE P FAST SERPL-MCNC: 117 MG/DL (ref 65–99)
GLUCOSE SERPL-MCNC: 117 MG/DL (ref 65–140)
HCT VFR BLD AUTO: 32.3 % (ref 36.5–49.3)
HDLC SERPL-MCNC: 23 MG/DL (ref 40–60)
HGB BLD-MCNC: 11.3 G/DL (ref 12–17)
LDLC SERPL CALC-MCNC: 88 MG/DL (ref 0–100)
LYMPHOCYTES # BLD AUTO: 1.81 THOUSAND/UL (ref 0.6–4.47)
LYMPHOCYTES # BLD AUTO: 49 % (ref 14–44)
MCH RBC QN AUTO: 32.7 PG (ref 26.8–34.3)
MCHC RBC AUTO-ENTMCNC: 34.9 G/DL (ref 31.4–37.4)
MCV RBC AUTO: 94 FL (ref 82–98)
MONOCYTES # BLD AUTO: 0.07 THOUSAND/UL (ref 0–1.22)
MONOCYTES NFR BLD AUTO: 2 % (ref 4–12)
NEUTS BAND NFR BLD MANUAL: 0 % (ref 0–8)
NEUTS SEG # BLD: 1.44 THOUSAND/UL (ref 1.81–6.82)
NEUTS SEG NFR BLD AUTO: 39 % (ref 43–75)
OVALOCYTES BLD QL SMEAR: PRESENT
PLATELET # BLD AUTO: 107 THOUSANDS/UL (ref 149–390)
PLATELET BLD QL SMEAR: ABNORMAL
PMV BLD AUTO: 6 FL (ref 8.9–12.7)
POTASSIUM SERPL-SCNC: 4 MMOL/L (ref 3.5–5.3)
PROT SERPL-MCNC: 6.1 G/DL (ref 6.4–8.2)
RBC # BLD AUTO: 3.44 MILLION/UL (ref 3.88–5.62)
SODIUM SERPL-SCNC: 141 MMOL/L (ref 136–145)
TOTAL CELLS COUNTED SPEC: 100
TRIGL SERPL-MCNC: 136 MG/DL
VARIANT LYMPHS # BLD AUTO: 1 % (ref 0–0)
WBC # BLD AUTO: 3.7 THOUSAND/UL (ref 4.31–10.16)
WBC NRBC COR # BLD: 3.7 THOUSAND/UL (ref 4.31–10.16)

## 2017-06-19 PROCEDURE — 85027 COMPLETE CBC AUTOMATED: CPT | Performed by: INTERNAL MEDICINE

## 2017-06-19 PROCEDURE — 85007 BL SMEAR W/DIFF WBC COUNT: CPT | Performed by: INTERNAL MEDICINE

## 2017-06-19 PROCEDURE — 80061 LIPID PANEL: CPT | Performed by: INTERNAL MEDICINE

## 2017-06-19 PROCEDURE — 80053 COMPREHEN METABOLIC PANEL: CPT | Performed by: INTERNAL MEDICINE

## 2017-06-19 PROCEDURE — 82232 ASSAY OF BETA-2 PROTEIN: CPT | Performed by: INTERNAL MEDICINE

## 2017-06-19 NOTE — PROGRESS NOTES
Pt here for central labs  Draw as ordered  Saline locked  Pt declined AVS, aware of next appointment

## 2017-06-20 LAB — B2 MICROGLOB SERPL-MCNC: 4.8 MG/L (ref 0.6–2.4)

## 2017-06-21 ENCOUNTER — ALLSCRIPTS OFFICE VISIT (OUTPATIENT)
Dept: OTHER | Facility: OTHER | Age: 62
End: 2017-06-21

## 2017-06-26 ENCOUNTER — HOSPITAL ENCOUNTER (OUTPATIENT)
Dept: INFUSION CENTER | Facility: CLINIC | Age: 62
Discharge: HOME | End: 2017-06-26
Payer: MEDICARE

## 2017-06-26 LAB
ANISOCYTOSIS BLD QL SMEAR: PRESENT
BASOPHILS # BLD AUTO: 0 THOUSAND/UL (ref 0–0.1)
BASOPHILS NFR MAR MANUAL: 0 % (ref 0–1)
EOSINOPHIL # BLD AUTO: 0.03 THOUSAND/UL (ref 0–0.61)
EOSINOPHIL NFR BLD MANUAL: 1 % (ref 0–6)
ERYTHROCYTE [DISTWIDTH] IN BLOOD BY AUTOMATED COUNT: 15.7 % (ref 11.6–15.1)
HCT VFR BLD AUTO: 31.5 % (ref 36.5–49.3)
HGB BLD-MCNC: 10.4 G/DL (ref 12–17)
LYMPHOCYTES # BLD AUTO: 0.62 THOUSAND/UL (ref 0.6–4.47)
LYMPHOCYTES # BLD AUTO: 20 % (ref 14–44)
MCH RBC QN AUTO: 31.8 PG (ref 26.8–34.3)
MCHC RBC AUTO-ENTMCNC: 33 G/DL (ref 31.4–37.4)
MCV RBC AUTO: 96 FL (ref 82–98)
MONOCYTES # BLD AUTO: 0.09 THOUSAND/UL (ref 0–1.22)
MONOCYTES NFR BLD AUTO: 3 % (ref 4–12)
NEUTS BAND NFR BLD MANUAL: 0 % (ref 0–8)
NEUTS SEG # BLD: 2.36 THOUSAND/UL (ref 1.81–6.82)
NEUTS SEG NFR BLD AUTO: 76 % (ref 43–75)
OVALOCYTES BLD QL SMEAR: PRESENT
PLATELET # BLD AUTO: 147 THOUSANDS/UL (ref 149–390)
PMV BLD AUTO: 6.4 FL (ref 8.9–12.7)
RBC # BLD AUTO: 3.26 MILLION/UL (ref 3.88–5.62)
TOTAL CELLS COUNTED SPEC: 100
WBC # BLD AUTO: 3.1 THOUSAND/UL (ref 4.31–10.16)
WBC NRBC COR # BLD: 3.1 THOUSAND/UL (ref 4.31–10.16)

## 2017-06-26 PROCEDURE — 85007 BL SMEAR W/DIFF WBC COUNT: CPT | Performed by: INTERNAL MEDICINE

## 2017-06-26 PROCEDURE — 85027 COMPLETE CBC AUTOMATED: CPT | Performed by: INTERNAL MEDICINE

## 2017-06-26 RX ORDER — FUROSEMIDE 40 MG/1
40 TABLET ORAL DAILY PRN
COMMUNITY
End: 2017-07-25

## 2017-06-26 RX ORDER — POTASSIUM CHLORIDE 20 MEQ/1
20 TABLET, EXTENDED RELEASE ORAL DAILY PRN
COMMUNITY
End: 2017-07-25

## 2017-06-30 ENCOUNTER — GENERIC CONVERSION - ENCOUNTER (OUTPATIENT)
Dept: OTHER | Facility: OTHER | Age: 62
End: 2017-06-30

## 2017-07-03 ENCOUNTER — HOSPITAL ENCOUNTER (OUTPATIENT)
Dept: INFUSION CENTER | Facility: CLINIC | Age: 62
Discharge: HOME | End: 2017-07-03
Payer: MEDICARE

## 2017-07-03 VITALS — WEIGHT: 193.1 LBS | BODY MASS INDEX: 26.18 KG/M2

## 2017-07-03 LAB
ANISOCYTOSIS BLD QL SMEAR: PRESENT
BASOPHILS # BLD AUTO: 0.04 THOUSAND/UL (ref 0–0.1)
BASOPHILS NFR MAR MANUAL: 1 % (ref 0–1)
DACRYOCYTES BLD QL SMEAR: PRESENT
EOSINOPHIL # BLD AUTO: 0.4 THOUSAND/UL (ref 0–0.61)
EOSINOPHIL NFR BLD MANUAL: 10 % (ref 0–6)
ERYTHROCYTE [DISTWIDTH] IN BLOOD BY AUTOMATED COUNT: 15.9 % (ref 11.6–15.1)
HCT VFR BLD AUTO: 32.5 % (ref 36.5–49.3)
HGB BLD-MCNC: 10.9 G/DL (ref 12–17)
LYMPHOCYTES # BLD AUTO: 0.56 THOUSAND/UL (ref 0.6–4.47)
LYMPHOCYTES # BLD AUTO: 14 % (ref 14–44)
MCH RBC QN AUTO: 32.5 PG (ref 26.8–34.3)
MCHC RBC AUTO-ENTMCNC: 33.6 G/DL (ref 31.4–37.4)
MCV RBC AUTO: 97 FL (ref 82–98)
MONOCYTES # BLD AUTO: 0.16 THOUSAND/UL (ref 0–1.22)
MONOCYTES NFR BLD AUTO: 4 % (ref 4–12)
NEUTS BAND NFR BLD MANUAL: 1 % (ref 0–8)
NEUTS SEG # BLD: 2.84 THOUSAND/UL (ref 1.81–6.82)
NEUTS SEG NFR BLD AUTO: 70 % (ref 43–75)
OVALOCYTES BLD QL SMEAR: PRESENT
PLATELET # BLD AUTO: 178 THOUSANDS/UL (ref 149–390)
PLATELET BLD QL SMEAR: ADEQUATE
PMV BLD AUTO: 6.7 FL (ref 8.9–12.7)
RBC # BLD AUTO: 3.36 MILLION/UL (ref 3.88–5.62)
TOTAL CELLS COUNTED SPEC: 100
WBC # BLD AUTO: 4 THOUSAND/UL (ref 4.31–10.16)
WBC NRBC COR # BLD: 4 THOUSAND/UL (ref 4.31–10.16)

## 2017-07-03 PROCEDURE — 85007 BL SMEAR W/DIFF WBC COUNT: CPT | Performed by: INTERNAL MEDICINE

## 2017-07-03 PROCEDURE — 85027 COMPLETE CBC AUTOMATED: CPT | Performed by: INTERNAL MEDICINE

## 2017-07-03 NOTE — PROGRESS NOTES
CBC with diff drawn and sent to lab as ordered  Port flushed per protocol with saline only, pt has allergy to heparin

## 2017-07-05 DIAGNOSIS — M25.50 PAIN IN JOINT: ICD-10-CM

## 2017-07-05 DIAGNOSIS — C91.10 CHRONIC LYMPHOCYTIC LEUKEMIA OF B-CELL TYPE NOT HAVING ACHIEVED REMISSION (HCC): ICD-10-CM

## 2017-07-05 DIAGNOSIS — D58.9 HEREDITARY HEMOLYTIC ANEMIA (HCC): ICD-10-CM

## 2017-07-10 ENCOUNTER — HOSPITAL ENCOUNTER (OUTPATIENT)
Dept: INFUSION CENTER | Facility: CLINIC | Age: 62
Discharge: HOME | End: 2017-07-10
Payer: COMMERCIAL

## 2017-07-10 LAB
ANISOCYTOSIS BLD QL SMEAR: PRESENT
BASOPHILS # BLD AUTO: 0 THOUSAND/UL (ref 0–0.1)
BASOPHILS NFR MAR MANUAL: 0 % (ref 0–1)
DACRYOCYTES BLD QL SMEAR: PRESENT
EOSINOPHIL # BLD AUTO: 0.14 THOUSAND/UL (ref 0–0.61)
EOSINOPHIL NFR BLD MANUAL: 4 % (ref 0–6)
ERYTHROCYTE [DISTWIDTH] IN BLOOD BY AUTOMATED COUNT: 15.9 % (ref 11.6–15.1)
HCT VFR BLD AUTO: 31.4 % (ref 36.5–49.3)
HGB BLD-MCNC: 10.6 G/DL (ref 12–17)
LYMPHOCYTES # BLD AUTO: 0.68 THOUSAND/UL (ref 0.6–4.47)
LYMPHOCYTES # BLD AUTO: 20 % (ref 14–44)
MCH RBC QN AUTO: 32.7 PG (ref 26.8–34.3)
MCHC RBC AUTO-ENTMCNC: 33.7 G/DL (ref 31.4–37.4)
MCV RBC AUTO: 97 FL (ref 82–98)
MONOCYTES # BLD AUTO: 0.17 THOUSAND/UL (ref 0–1.22)
MONOCYTES NFR BLD AUTO: 5 % (ref 4–12)
NEUTS BAND NFR BLD MANUAL: 3 % (ref 0–8)
NEUTS SEG # BLD: 2.41 THOUSAND/UL (ref 1.81–6.82)
NEUTS SEG NFR BLD AUTO: 68 % (ref 43–75)
OVALOCYTES BLD QL SMEAR: PRESENT
PLATELET # BLD AUTO: 172 THOUSANDS/UL (ref 149–390)
PLATELET BLD QL SMEAR: ADEQUATE
PMV BLD AUTO: 6.5 FL (ref 8.9–12.7)
RBC # BLD AUTO: 3.23 MILLION/UL (ref 3.88–5.62)
TOTAL CELLS COUNTED SPEC: 100
WBC # BLD AUTO: 3.4 THOUSAND/UL (ref 4.31–10.16)
WBC NRBC COR # BLD: 3.4 THOUSAND/UL (ref 4.31–10.16)

## 2017-07-10 PROCEDURE — 85027 COMPLETE CBC AUTOMATED: CPT | Performed by: INTERNAL MEDICINE

## 2017-07-10 PROCEDURE — 85007 BL SMEAR W/DIFF WBC COUNT: CPT | Performed by: INTERNAL MEDICINE

## 2017-07-17 ENCOUNTER — HOSPITAL ENCOUNTER (OUTPATIENT)
Dept: INFUSION CENTER | Facility: CLINIC | Age: 62
Discharge: HOME | End: 2017-07-17
Payer: MEDICARE

## 2017-07-17 LAB
ANISOCYTOSIS BLD QL SMEAR: PRESENT
BASOPHILS # BLD AUTO: 0.03 THOUSAND/UL (ref 0–0.1)
BASOPHILS NFR MAR MANUAL: 1 % (ref 0–1)
DACRYOCYTES BLD QL SMEAR: PRESENT
EOSINOPHIL # BLD AUTO: 0 THOUSAND/UL (ref 0–0.61)
EOSINOPHIL NFR BLD MANUAL: 0 % (ref 0–6)
ERYTHROCYTE [DISTWIDTH] IN BLOOD BY AUTOMATED COUNT: 14.8 % (ref 11.6–15.1)
HCT VFR BLD AUTO: 26.2 % (ref 36.5–49.3)
HGB BLD-MCNC: 8.7 G/DL (ref 12–17)
IGG SERPL-MCNC: 261 MG/DL (ref 700–1600)
LDH SERPL-CCNC: 220 U/L (ref 81–234)
LYMPHOCYTES # BLD AUTO: 0.38 THOUSAND/UL (ref 0.6–4.47)
LYMPHOCYTES # BLD AUTO: 14 % (ref 14–44)
MACROCYTES BLD QL AUTO: PRESENT
MCH RBC QN AUTO: 33.8 PG (ref 26.8–34.3)
MCHC RBC AUTO-ENTMCNC: 33.3 G/DL (ref 31.4–37.4)
MCV RBC AUTO: 101 FL (ref 82–98)
MONOCYTES # BLD AUTO: 0.05 THOUSAND/UL (ref 0–1.22)
MONOCYTES NFR BLD AUTO: 2 % (ref 4–12)
NEUTS BAND NFR BLD MANUAL: 3 % (ref 0–8)
NEUTS SEG # BLD: 2.24 THOUSAND/UL (ref 1.81–6.82)
NEUTS SEG NFR BLD AUTO: 80 % (ref 43–75)
OVALOCYTES BLD QL SMEAR: PRESENT
PLATELET # BLD AUTO: 147 THOUSANDS/UL (ref 149–390)
PLATELET BLD QL SMEAR: ABNORMAL
PMV BLD AUTO: 6.4 FL (ref 8.9–12.7)
RBC # BLD AUTO: 2.58 MILLION/UL (ref 3.88–5.62)
RETICS # AUTO: ABNORMAL 10*3/UL (ref 14356–105094)
RETICS # CALC: 7.08 % (ref 0.37–1.87)
TOTAL CELLS COUNTED SPEC: 100
URATE SERPL-MCNC: 5.9 MG/DL (ref 4.2–8)
WBC # BLD AUTO: 2.7 THOUSAND/UL (ref 4.31–10.16)
WBC NRBC COR # BLD: 2.7 THOUSAND/UL (ref 4.31–10.16)

## 2017-07-17 PROCEDURE — 82784 ASSAY IGA/IGD/IGG/IGM EACH: CPT | Performed by: INTERNAL MEDICINE

## 2017-07-17 PROCEDURE — 85007 BL SMEAR W/DIFF WBC COUNT: CPT | Performed by: INTERNAL MEDICINE

## 2017-07-17 PROCEDURE — 84550 ASSAY OF BLOOD/URIC ACID: CPT | Performed by: INTERNAL MEDICINE

## 2017-07-17 PROCEDURE — 83615 LACTATE (LD) (LDH) ENZYME: CPT | Performed by: INTERNAL MEDICINE

## 2017-07-17 PROCEDURE — 82232 ASSAY OF BETA-2 PROTEIN: CPT | Performed by: INTERNAL MEDICINE

## 2017-07-17 PROCEDURE — 85027 COMPLETE CBC AUTOMATED: CPT | Performed by: INTERNAL MEDICINE

## 2017-07-17 PROCEDURE — 85045 AUTOMATED RETICULOCYTE COUNT: CPT | Performed by: INTERNAL MEDICINE

## 2017-07-17 NOTE — PLAN OF CARE
Problem: Potential for Falls  Goal: Patient will remain free of falls  INTERVENTIONS:  - Assess patient frequently for physical needs  -  Identify cognitive and physical deficits and behaviors that affect risk of falls    -  [place] fall precautions as indicated by assessment   - Educate patient/family on patient safety including physical limitations  - Instruct patient to call for assistance with activity based on assessment  - Modify environment to reduce risk of injury  - Consider OT/PT consult to assist with strengthening/mobility   Outcome: Progressing

## 2017-07-18 LAB — B2 MICROGLOB SERPL-MCNC: 3.1 MG/L (ref 0.6–2.4)

## 2017-07-19 ENCOUNTER — HOSPITAL ENCOUNTER (OUTPATIENT)
Dept: INFUSION CENTER | Facility: CLINIC | Age: 62
Discharge: HOME | End: 2017-07-19
Payer: MEDICARE

## 2017-07-19 ENCOUNTER — ALLSCRIPTS OFFICE VISIT (OUTPATIENT)
Dept: OTHER | Facility: OTHER | Age: 62
End: 2017-07-19

## 2017-07-19 LAB
ABO GROUP BLD: NORMAL
ALBUMIN SERPL BCP-MCNC: 4.5 G/DL (ref 3.5–5)
ALP SERPL-CCNC: 38 U/L (ref 46–116)
ALT SERPL W P-5'-P-CCNC: <6 U/L (ref 12–78)
ANION GAP SERPL CALCULATED.3IONS-SCNC: 7 MMOL/L (ref 4–13)
ANISOCYTOSIS BLD QL SMEAR: PRESENT
AST SERPL W P-5'-P-CCNC: 8 U/L (ref 5–45)
BASOPHILS # BLD AUTO: 0.04 THOUSAND/UL (ref 0–0.1)
BASOPHILS NFR MAR MANUAL: 1 % (ref 0–1)
BILIRUB DIRECT SERPL-MCNC: 0.42 MG/DL (ref 0–0.2)
BILIRUB SERPL-MCNC: 5.8 MG/DL (ref 0.2–1)
BLD GP AB SCN SERPL QL: POSITIVE
BLOOD GROUP ANTIBODIES SERPL: NORMAL
BUN SERPL-MCNC: 23 MG/DL (ref 5–25)
CALCIUM SERPL-MCNC: 9.4 MG/DL (ref 8.3–10.1)
CHLORIDE SERPL-SCNC: 107 MMOL/L (ref 100–108)
CO2 SERPL-SCNC: 26 MMOL/L (ref 21–32)
CREAT SERPL-MCNC: 1.15 MG/DL (ref 0.6–1.3)
EOSINOPHIL # BLD AUTO: 0 THOUSAND/UL (ref 0–0.61)
EOSINOPHIL NFR BLD MANUAL: 0 % (ref 0–6)
ERYTHROCYTE [DISTWIDTH] IN BLOOD BY AUTOMATED COUNT: 16.8 % (ref 11.6–15.1)
GFR SERPL CREATININE-BSD FRML MDRD: >60 ML/MIN/1.73SQ M
GLUCOSE SERPL-MCNC: 117 MG/DL (ref 65–140)
HCT VFR BLD AUTO: 21.8 % (ref 36.5–49.3)
HGB BLD-MCNC: 7 G/DL (ref 12–17)
LYMPHOCYTES # BLD AUTO: 0.46 THOUSAND/UL (ref 0.6–4.47)
LYMPHOCYTES # BLD AUTO: 13 % (ref 14–44)
MACROCYTES BLD QL AUTO: PRESENT
MCH RBC QN AUTO: 34.4 PG (ref 26.8–34.3)
MCHC RBC AUTO-ENTMCNC: 32.4 G/DL (ref 31.4–37.4)
MCV RBC AUTO: 106 FL (ref 82–98)
MONOCYTES # BLD AUTO: 0.18 THOUSAND/UL (ref 0–1.22)
MONOCYTES NFR BLD AUTO: 5 % (ref 4–12)
NEUTS BAND NFR BLD MANUAL: 1 % (ref 0–8)
NEUTS SEG # BLD: 2.84 THOUSAND/UL (ref 1.81–6.82)
NEUTS SEG NFR BLD AUTO: 80 % (ref 43–75)
PLATELET # BLD AUTO: 155 THOUSANDS/UL (ref 149–390)
PLATELET BLD QL SMEAR: ADEQUATE
PMV BLD AUTO: 6.8 FL (ref 8.9–12.7)
POTASSIUM SERPL-SCNC: 4.4 MMOL/L (ref 3.5–5.3)
PROT SERPL-MCNC: 6 G/DL (ref 6.4–8.2)
RBC # BLD AUTO: 2.05 MILLION/UL (ref 3.88–5.62)
RETICS # AUTO: ABNORMAL 10*3/UL (ref 14356–105094)
RETICS # CALC: 13.19 % (ref 0.37–1.87)
RH BLD: POSITIVE
SODIUM SERPL-SCNC: 140 MMOL/L (ref 136–145)
SPECIMEN EXPIRATION DATE: NORMAL
TOTAL CELLS COUNTED SPEC: 100
WBC # BLD AUTO: 3.5 THOUSAND/UL (ref 4.31–10.16)
WBC NRBC COR # BLD: 3.5 THOUSAND/UL (ref 4.31–10.16)

## 2017-07-19 PROCEDURE — 86618 LYME DISEASE ANTIBODY: CPT | Performed by: PHYSICIAN ASSISTANT

## 2017-07-19 PROCEDURE — 86921 COMPATIBILITY TEST INCUBATE: CPT

## 2017-07-19 PROCEDURE — 86900 BLOOD TYPING SEROLOGIC ABO: CPT | Performed by: INTERNAL MEDICINE

## 2017-07-19 PROCEDURE — 86901 BLOOD TYPING SEROLOGIC RH(D): CPT | Performed by: INTERNAL MEDICINE

## 2017-07-19 PROCEDURE — 80053 COMPREHEN METABOLIC PANEL: CPT | Performed by: PHYSICIAN ASSISTANT

## 2017-07-19 PROCEDURE — 86922 COMPATIBILITY TEST ANTIGLOB: CPT

## 2017-07-19 PROCEDURE — 85007 BL SMEAR W/DIFF WBC COUNT: CPT | Performed by: PHYSICIAN ASSISTANT

## 2017-07-19 PROCEDURE — 82248 BILIRUBIN DIRECT: CPT | Performed by: PHYSICIAN ASSISTANT

## 2017-07-19 PROCEDURE — 85027 COMPLETE CBC AUTOMATED: CPT | Performed by: PHYSICIAN ASSISTANT

## 2017-07-19 PROCEDURE — 85045 AUTOMATED RETICULOCYTE COUNT: CPT | Performed by: PHYSICIAN ASSISTANT

## 2017-07-19 PROCEDURE — 86870 RBC ANTIBODY IDENTIFICATION: CPT | Performed by: INTERNAL MEDICINE

## 2017-07-19 PROCEDURE — 86850 RBC ANTIBODY SCREEN: CPT | Performed by: INTERNAL MEDICINE

## 2017-07-19 NOTE — PLAN OF CARE
Problem: Potential for Falls  Goal: Patient will remain free of falls  INTERVENTIONS:  - Assess patient frequently for physical needs  -  Identify cognitive and physical deficits and behaviors that affect risk of falls    -  [place] fall precautions as indicated by assessment   - Educate patient/family on patient safety including physical limitations  - Instruct patient to call for assistance with activity based on assessment  - Modify environment to reduce risk of injury  - Consider OT/PT consult to assist with strengthening/mobility   Outcome: Progressing

## 2017-07-19 NOTE — PROGRESS NOTES
Patient tolerated his central lab draw  Informed Star Craig RN for Dr Jennifer Kaiser that his hgb is 7 0  Patient is pale and sob on exertion at the moment  He is positive for antibodies and is aware to return tomorrow morning  Currently waiting for blood transfusion orders  Patient is aware to go to the hospital if he feels worse during the night

## 2017-07-20 ENCOUNTER — HOSPITAL ENCOUNTER (OUTPATIENT)
Dept: INFUSION CENTER | Facility: CLINIC | Age: 62
Discharge: HOME | End: 2017-07-20
Payer: MEDICARE

## 2017-07-20 VITALS
HEART RATE: 76 BPM | RESPIRATION RATE: 18 BRPM | TEMPERATURE: 97.9 F | DIASTOLIC BLOOD PRESSURE: 71 MMHG | SYSTOLIC BLOOD PRESSURE: 113 MMHG

## 2017-07-20 LAB
B BURGDOR IGG SER IA-ACNC: 0.06
B BURGDOR IGM SER IA-ACNC: 0.22

## 2017-07-20 PROCEDURE — 36430 TRANSFUSION BLD/BLD COMPNT: CPT

## 2017-07-20 PROCEDURE — 86850 RBC ANTIBODY SCREEN: CPT | Performed by: INTERNAL MEDICINE

## 2017-07-20 PROCEDURE — P9040 RBC LEUKOREDUCED IRRADIATED: HCPCS

## 2017-07-20 PROCEDURE — 86921 COMPATIBILITY TEST INCUBATE: CPT

## 2017-07-20 PROCEDURE — 86922 COMPATIBILITY TEST ANTIGLOB: CPT

## 2017-07-20 PROCEDURE — 96374 THER/PROPH/DIAG INJ IV PUSH: CPT

## 2017-07-20 PROCEDURE — 86900 BLOOD TYPING SEROLOGIC ABO: CPT | Performed by: INTERNAL MEDICINE

## 2017-07-20 PROCEDURE — 86901 BLOOD TYPING SEROLOGIC RH(D): CPT | Performed by: INTERNAL MEDICINE

## 2017-07-20 RX ORDER — PREDNISONE 20 MG/1
40 TABLET ORAL DAILY
COMMUNITY
End: 2017-09-20 | Stop reason: HOSPADM

## 2017-07-20 RX ORDER — ACETAMINOPHEN 325 MG/1
650 TABLET ORAL ONCE
Status: COMPLETED | OUTPATIENT
Start: 2017-07-20 | End: 2017-07-20

## 2017-07-20 RX ORDER — SODIUM CHLORIDE 9 MG/ML
20 INJECTION, SOLUTION INTRAVENOUS CONTINUOUS
Status: DISCONTINUED | OUTPATIENT
Start: 2017-07-20 | End: 2017-07-23 | Stop reason: HOSPADM

## 2017-07-20 RX ORDER — FUROSEMIDE 10 MG/ML
20 INJECTION INTRAMUSCULAR; INTRAVENOUS ONCE
Status: COMPLETED | OUTPATIENT
Start: 2017-07-20 | End: 2017-07-20

## 2017-07-20 RX ADMIN — SODIUM CHLORIDE 20 ML/HR: 0.9 INJECTION, SOLUTION INTRAVENOUS at 08:30

## 2017-07-20 RX ADMIN — FUROSEMIDE 20 MG: 10 INJECTION, SOLUTION INTRAMUSCULAR; INTRAVENOUS at 11:49

## 2017-07-20 RX ADMIN — ACETAMINOPHEN 650 MG: 325 TABLET, FILM COATED ORAL at 08:28

## 2017-07-20 NOTE — PROGRESS NOTES
Patient arrived to the unit today for 2 units of blood  He had taken 60mg of prednisone last night per the PA's orders  His color appears to have improved  He is not as pale and not as jaundiced today  His first unit is infusing

## 2017-07-20 NOTE — PROGRESS NOTES
Patient reported that he feels better after 2 units of PRBC  Patient aware to return tomorrow for central labs

## 2017-07-20 NOTE — PLAN OF CARE
Problem: Potential for Falls  Goal: Patient will remain free of falls  INTERVENTIONS:  - Assess patient frequently for physical needs  -  Identify cognitive and physical deficits and behaviors that affect risk of falls    -  [place] fall precautions as indicated by assessment   - Educate patient/family on patient safety including physical limitations  - Instruct patient to call for assistance with activity based on assessment  - Modify environment to reduce risk of injury  - Consider OT/PT consult to assist with strengthening/mobility   Outcome: Progressing

## 2017-07-21 ENCOUNTER — LAB REQUISITION (OUTPATIENT)
Dept: LAB | Facility: HOSPITAL | Age: 62
End: 2017-07-21
Payer: MEDICARE

## 2017-07-21 ENCOUNTER — HOSPITAL ENCOUNTER (OUTPATIENT)
Dept: INFUSION CENTER | Facility: CLINIC | Age: 62
Discharge: HOME | End: 2017-07-21
Payer: MEDICARE

## 2017-07-21 DIAGNOSIS — D64.9 ANEMIA: ICD-10-CM

## 2017-07-21 DIAGNOSIS — D58.9 HEREDITARY HEMOLYTIC ANEMIA (HCC): ICD-10-CM

## 2017-07-21 DIAGNOSIS — C91.10 CHRONIC LYMPHOCYTIC LEUKEMIA OF B-CELL TYPE NOT HAVING ACHIEVED REMISSION (HCC): ICD-10-CM

## 2017-07-21 LAB
ABO GROUP BLD BPU: NORMAL
ABO GROUP BLD BPU: NORMAL
ABO GROUP BLD: NORMAL
ALBUMIN SERPL BCP-MCNC: 4 G/DL (ref 3.2–5)
ALP SERPL-CCNC: 44 U/L (ref 46–116)
ALT SERPL W P-5'-P-CCNC: 13 U/L (ref 12–78)
ANION GAP SERPL CALCULATED.3IONS-SCNC: 3 MMOL/L (ref 4–13)
ANISOCYTOSIS BLD QL SMEAR: PRESENT
AST SERPL W P-5'-P-CCNC: 28 U/L (ref 5–45)
BASOPHILS # BLD AUTO: 0 THOUSAND/UL (ref 0–0.1)
BASOPHILS NFR MAR MANUAL: 0 % (ref 0–1)
BILIRUB SERPL-MCNC: 5.2 MG/DL (ref 0.2–1)
BLD GP AB SCN SERPL QL: POSITIVE
BPU ID: NORMAL
BPU ID: NORMAL
BUN SERPL-MCNC: 32 MG/DL (ref 5–25)
CALCIUM SERPL-MCNC: 9.2 MG/DL (ref 8.3–10.1)
CHLORIDE SERPL-SCNC: 105 MMOL/L (ref 98–108)
CO2 SERPL-SCNC: 26 MMOL/L (ref 21–32)
CREAT SERPL-MCNC: 1.6 MG/DL (ref 0.6–1.3)
CROSSMATCH: NORMAL
CROSSMATCH: NORMAL
DACRYOCYTES BLD QL SMEAR: PRESENT
EOSINOPHIL # BLD AUTO: 0 THOUSAND/UL (ref 0–0.61)
EOSINOPHIL NFR BLD MANUAL: 0 % (ref 0–6)
ERYTHROCYTE [DISTWIDTH] IN BLOOD BY AUTOMATED COUNT: 26.3 % (ref 11.6–15.1)
GFR SERPL CREATININE-BSD FRML MDRD: 44 ML/MIN/1.73SQ M
GLUCOSE SERPL-MCNC: 116 MG/DL (ref 65–140)
HCT VFR BLD AUTO: 23.4 % (ref 36.5–49.3)
HGB BLD-MCNC: 7.7 G/DL (ref 12–17)
IGG SERPL-MCNC: 286 MG/DL (ref 700–1600)
LDH SERPL-CCNC: 275 U/L (ref 81–234)
LYMPHOCYTES # BLD AUTO: 0.48 THOUSAND/UL (ref 0.6–4.47)
LYMPHOCYTES # BLD AUTO: 9 % (ref 14–44)
MACROCYTES BLD QL AUTO: PRESENT
MCH RBC QN AUTO: 33.6 PG (ref 26.8–34.3)
MCHC RBC AUTO-ENTMCNC: 32.9 G/DL (ref 31.4–37.4)
MCV RBC AUTO: 102 FL (ref 82–98)
MONOCYTES # BLD AUTO: 0.16 THOUSAND/UL (ref 0–1.22)
MONOCYTES NFR BLD AUTO: 3 % (ref 4–12)
NEUTS BAND NFR BLD MANUAL: 5 % (ref 0–8)
NEUTS SEG # BLD: 4.66 THOUSAND/UL (ref 1.81–6.82)
NEUTS SEG NFR BLD AUTO: 83 % (ref 43–75)
OVALOCYTES BLD QL SMEAR: PRESENT
PLATELET # BLD AUTO: 168 THOUSANDS/UL (ref 149–390)
PLATELET BLD QL SMEAR: ADEQUATE
PMV BLD AUTO: 6.4 FL (ref 8.9–12.7)
POTASSIUM SERPL-SCNC: 3.9 MMOL/L (ref 3.5–5.3)
PROT SERPL-MCNC: 6.3 G/DL (ref 6.4–8.2)
RBC # BLD AUTO: 2.3 MILLION/UL (ref 3.88–5.62)
RETICS # AUTO: ABNORMAL 10*3/UL (ref 14356–105094)
RETICS # CALC: 14.88 % (ref 0.37–1.87)
RH BLD: POSITIVE
SODIUM SERPL-SCNC: 134 MMOL/L (ref 136–145)
SPECIMEN EXPIRATION DATE: NORMAL
TOTAL CELLS COUNTED SPEC: 100
UNIT DISPENSE STATUS: NORMAL
UNIT DISPENSE STATUS: NORMAL
UNIT PRODUCT CODE: NORMAL
UNIT PRODUCT CODE: NORMAL
UNIT RH: NORMAL
UNIT RH: NORMAL
URATE SERPL-MCNC: 6.1 MG/DL (ref 4.2–8)
WBC # BLD AUTO: 5.3 THOUSAND/UL (ref 4.31–10.16)
WBC NRBC COR # BLD: 5.3 THOUSAND/UL (ref 4.31–10.16)

## 2017-07-21 PROCEDURE — 80053 COMPREHEN METABOLIC PANEL: CPT | Performed by: PHYSICIAN ASSISTANT

## 2017-07-21 PROCEDURE — 85045 AUTOMATED RETICULOCYTE COUNT: CPT | Performed by: PHYSICIAN ASSISTANT

## 2017-07-21 PROCEDURE — 82784 ASSAY IGA/IGD/IGG/IGM EACH: CPT | Performed by: PHYSICIAN ASSISTANT

## 2017-07-21 PROCEDURE — 85007 BL SMEAR W/DIFF WBC COUNT: CPT | Performed by: PHYSICIAN ASSISTANT

## 2017-07-21 PROCEDURE — 83615 LACTATE (LD) (LDH) ENZYME: CPT | Performed by: PHYSICIAN ASSISTANT

## 2017-07-21 PROCEDURE — 84550 ASSAY OF BLOOD/URIC ACID: CPT | Performed by: PHYSICIAN ASSISTANT

## 2017-07-21 PROCEDURE — 85027 COMPLETE CBC AUTOMATED: CPT | Performed by: PHYSICIAN ASSISTANT

## 2017-07-21 PROCEDURE — 82232 ASSAY OF BETA-2 PROTEIN: CPT | Performed by: PHYSICIAN ASSISTANT

## 2017-07-21 RX ORDER — ACETAMINOPHEN 325 MG/1
650 TABLET ORAL ONCE
Status: COMPLETED | OUTPATIENT
Start: 2017-07-22 | End: 2017-07-22

## 2017-07-21 RX ORDER — SODIUM CHLORIDE 9 MG/ML
20 INJECTION, SOLUTION INTRAVENOUS CONTINUOUS
Status: DISCONTINUED | OUTPATIENT
Start: 2017-07-22 | End: 2017-07-25 | Stop reason: HOSPADM

## 2017-07-21 RX ORDER — FUROSEMIDE 10 MG/ML
20 INJECTION INTRAMUSCULAR; INTRAVENOUS ONCE
Status: COMPLETED | OUTPATIENT
Start: 2017-07-22 | End: 2017-07-22

## 2017-07-21 NOTE — PLAN OF CARE
Problem: Potential for Falls  Goal: Patient will remain free of falls  INTERVENTIONS:  - Assess patient frequently for physical needs  -  Identify cognitive and physical deficits and behaviors that affect risk of falls    -  [place] fall precautions as indicated by assessment   - Educate patient/family on patient safety including physical limitations  - Instruct patient to call for assistance with activity based on assessment  - Modify environment to reduce risk of injury  - Consider OT/PT consult to assist with strengthening/mobility   Outcome: Progressing

## 2017-07-21 NOTE — PROGRESS NOTES
Hemoglobin 7 7  Julia Mccarthy RN made aware and spoke with Esperanza Grajeda PA-C  Office got in touch with Dr Alvaro Escoto  Blood bank tube on hold drawn for 2 units to be transfused in [facility] on Saturday  Pt aware and also aware to return for additional labwork on Monday   Refused AVS

## 2017-07-21 NOTE — PROGRESS NOTES
Pt arrived for central labs  Catheter maintenance performed per protocol   Seth Molina RN not in office - Shaka Marcos RN covering for Dr Ijeoma Marte and advised to draw his "recurring lab schedule"

## 2017-07-22 ENCOUNTER — HOSPITAL ENCOUNTER (OUTPATIENT)
Dept: INFUSION CENTER | Facility: HOSPITAL | Age: 62
Discharge: HOME | End: 2017-07-22
Payer: MEDICARE

## 2017-07-22 VITALS
TEMPERATURE: 98.1 F | RESPIRATION RATE: 20 BRPM | DIASTOLIC BLOOD PRESSURE: 62 MMHG | HEART RATE: 74 BPM | SYSTOLIC BLOOD PRESSURE: 128 MMHG

## 2017-07-22 LAB
ABO GROUP BLD: NORMAL
B2 MICROGLOB SERPL-MCNC: 3.4 MG/L (ref 0.6–2.4)
BLD GP AB SCN SERPL QL: POSITIVE
BLOOD GROUP ANTIBODIES SERPL: NORMAL
DAT C3-SP REAG RBC QL: NEGATIVE
DAT IGG-SP REAG RBCCO QL: NORMAL
DAT POLY-SP REAG RBC QL: NORMAL
RH BLD: POSITIVE
SPECIMEN EXPIRATION DATE: NORMAL

## 2017-07-22 PROCEDURE — 86922 COMPATIBILITY TEST ANTIGLOB: CPT

## 2017-07-22 PROCEDURE — 86860 RBC ANTIBODY ELUTION: CPT | Performed by: INTERNAL MEDICINE

## 2017-07-22 PROCEDURE — 86870 RBC ANTIBODY IDENTIFICATION: CPT | Performed by: INTERNAL MEDICINE

## 2017-07-22 PROCEDURE — P9040 RBC LEUKOREDUCED IRRADIATED: HCPCS

## 2017-07-22 PROCEDURE — 96374 THER/PROPH/DIAG INJ IV PUSH: CPT

## 2017-07-22 PROCEDURE — 36430 TRANSFUSION BLD/BLD COMPNT: CPT

## 2017-07-22 PROCEDURE — 86850 RBC ANTIBODY SCREEN: CPT | Performed by: INTERNAL MEDICINE

## 2017-07-22 PROCEDURE — 86921 COMPATIBILITY TEST INCUBATE: CPT

## 2017-07-22 PROCEDURE — 86880 COOMBS TEST DIRECT: CPT | Performed by: INTERNAL MEDICINE

## 2017-07-22 PROCEDURE — 86901 BLOOD TYPING SEROLOGIC RH(D): CPT | Performed by: INTERNAL MEDICINE

## 2017-07-22 PROCEDURE — 86900 BLOOD TYPING SEROLOGIC ABO: CPT | Performed by: INTERNAL MEDICINE

## 2017-07-22 RX ADMIN — SODIUM CHLORIDE 20 ML/HR: 0.9 INJECTION, SOLUTION INTRAVENOUS at 08:15

## 2017-07-22 RX ADMIN — FUROSEMIDE 20 MG: 10 INJECTION, SOLUTION INTRAMUSCULAR; INTRAVENOUS at 10:21

## 2017-07-22 RX ADMIN — ACETAMINOPHEN 650 MG: 325 TABLET, FILM COATED ORAL at 07:53

## 2017-07-22 NOTE — PROGRESS NOTES
Patient received 2 units of PRBC today at [facility] infusion without incident  Patient for 1 unit of PRBC on Monday at [facility]  Blood bank hold tube drawn as instructed by blood bank and sent  Blood bank aware specimen to be sent to [facility]  Fina Horton in blood bank states I can not put prepare order in here in [facility] for Imelda on Monday but assured me that Merv[place]franck was aware patient for blood Monday and would have the one unit prepared

## 2017-07-22 NOTE — PLAN OF CARE
Problem: Potential for Falls  Goal: Patient will remain free of falls  INTERVENTIONS:  - Assess patient frequently for physical needs  -  Identify cognitive and physical deficits and behaviors that affect risk of falls    -  [place] fall precautions as indicated by assessment   - Educate patient/family on patient safety including physical limitations  - Instruct patient to call for assistance with activity based on assessment  - Modify environment to reduce risk of injury  - Consider OT/PT consult to assist with strengthening/mobility   Outcome: Progressing

## 2017-07-23 LAB
ABO GROUP BLD BPU: NORMAL
ABO GROUP BLD BPU: NORMAL
ANTIBODY ELUTION #1: NORMAL
BPU ID: NORMAL
BPU ID: NORMAL
CROSSMATCH: NORMAL
CROSSMATCH: NORMAL
UNIT DISPENSE STATUS: NORMAL
UNIT DISPENSE STATUS: NORMAL
UNIT PRODUCT CODE: NORMAL
UNIT PRODUCT CODE: NORMAL
UNIT RH: NORMAL
UNIT RH: NORMAL

## 2017-07-24 ENCOUNTER — HOSPITAL ENCOUNTER (OUTPATIENT)
Dept: INFUSION CENTER | Facility: CLINIC | Age: 62
Discharge: HOME | End: 2017-07-24
Payer: MEDICARE

## 2017-07-24 ENCOUNTER — GENERIC CONVERSION - ENCOUNTER (OUTPATIENT)
Dept: OTHER | Facility: OTHER | Age: 62
End: 2017-07-24

## 2017-07-24 VITALS
SYSTOLIC BLOOD PRESSURE: 132 MMHG | TEMPERATURE: 98.6 F | RESPIRATION RATE: 16 BRPM | HEART RATE: 80 BPM | DIASTOLIC BLOOD PRESSURE: 82 MMHG

## 2017-07-24 LAB
ANISOCYTOSIS BLD QL SMEAR: PRESENT
BASOPHILS # BLD AUTO: 0 THOUSAND/UL (ref 0–0.1)
BASOPHILS NFR MAR MANUAL: 0 % (ref 0–1)
EOSINOPHIL # BLD AUTO: 0.04 THOUSAND/UL (ref 0–0.61)
EOSINOPHIL NFR BLD MANUAL: 1 % (ref 0–6)
ERYTHROCYTE [DISTWIDTH] IN BLOOD BY AUTOMATED COUNT: 27.3 % (ref 11.6–15.1)
HCT VFR BLD AUTO: 24 % (ref 36.5–49.3)
HGB BLD-MCNC: 7.9 G/DL (ref 12–17)
LYMPHOCYTES # BLD AUTO: 0.66 THOUSAND/UL (ref 0.6–4.47)
LYMPHOCYTES # BLD AUTO: 16 % (ref 14–44)
MACROCYTES BLD QL AUTO: PRESENT
MCH RBC QN AUTO: 34.2 PG (ref 26.8–34.3)
MCHC RBC AUTO-ENTMCNC: 32.8 G/DL (ref 31.4–37.4)
MCV RBC AUTO: 104 FL (ref 82–98)
MONOCYTES # BLD AUTO: 0.12 THOUSAND/UL (ref 0–1.22)
MONOCYTES NFR BLD AUTO: 3 % (ref 4–12)
NEUTS BAND NFR BLD MANUAL: 1 % (ref 0–8)
NEUTS SEG # BLD: 3.28 THOUSAND/UL (ref 1.81–6.82)
NEUTS SEG NFR BLD AUTO: 79 % (ref 43–75)
OVALOCYTES BLD QL SMEAR: PRESENT
PLATELET # BLD AUTO: 163 THOUSANDS/UL (ref 149–390)
PLATELET BLD QL SMEAR: ADEQUATE
PMV BLD AUTO: 6.7 FL (ref 8.9–12.7)
POLYCHROMASIA BLD QL SMEAR: PRESENT
RBC # BLD AUTO: 2.3 MILLION/UL (ref 3.88–5.62)
TOTAL CELLS COUNTED SPEC: 100
WBC # BLD AUTO: 4.1 THOUSAND/UL (ref 4.31–10.16)
WBC NRBC COR # BLD: 4.1 THOUSAND/UL (ref 4.31–10.16)

## 2017-07-24 PROCEDURE — P9040 RBC LEUKOREDUCED IRRADIATED: HCPCS

## 2017-07-24 PROCEDURE — 85027 COMPLETE CBC AUTOMATED: CPT | Performed by: INTERNAL MEDICINE

## 2017-07-24 PROCEDURE — 36430 TRANSFUSION BLD/BLD COMPNT: CPT

## 2017-07-24 PROCEDURE — 85007 BL SMEAR W/DIFF WBC COUNT: CPT | Performed by: INTERNAL MEDICINE

## 2017-07-24 RX ORDER — SODIUM CHLORIDE 9 MG/ML
20 INJECTION, SOLUTION INTRAVENOUS CONTINUOUS
Status: DISCONTINUED | OUTPATIENT
Start: 2017-07-24 | End: 2017-07-27 | Stop reason: HOSPADM

## 2017-07-24 RX ORDER — SODIUM CHLORIDE 9 MG/ML
20 INJECTION, SOLUTION INTRAVENOUS CONTINUOUS
Status: DISCONTINUED | OUTPATIENT
Start: 2017-07-25 | End: 2017-07-28 | Stop reason: HOSPADM

## 2017-07-24 RX ORDER — ACETAMINOPHEN 325 MG/1
650 TABLET ORAL ONCE
Status: COMPLETED | OUTPATIENT
Start: 2017-07-25 | End: 2017-07-25

## 2017-07-24 RX ORDER — ACETAMINOPHEN 325 MG/1
650 TABLET ORAL ONCE
Status: COMPLETED | OUTPATIENT
Start: 2017-07-24 | End: 2017-07-24

## 2017-07-24 RX ADMIN — SODIUM CHLORIDE 20 ML/HR: 0.9 INJECTION, SOLUTION INTRAVENOUS at 08:43

## 2017-07-24 RX ADMIN — ACETAMINOPHEN 650 MG: 325 TABLET, FILM COATED ORAL at 08:42

## 2017-07-24 NOTE — PLAN OF CARE
Problem: Potential for Falls  Goal: Patient will remain free of falls  INTERVENTIONS:  - Assess patient frequently for physical needs  -  Identify cognitive and physical deficits and behaviors that affect risk of falls    -  [place] fall precautions as indicated by assessment   - Educate patient/family on patient safety including physical limitations  - Instruct patient to call for assistance with activity based on assessment  - Modify environment to reduce risk of injury  - Consider OT/PT consult to assist with strengthening/mobility   Outcome: Progressing

## 2017-07-24 NOTE — PROGRESS NOTES
Pt  Received one unit of PRBCs today for Hgb of 7 7  CBC was drawn this morning with port access and current Hgb is 7 9, prior to blood infusion  All other labs are WNL  Spoke to [employer] regarding plan for labs this week  Pt  Will have regular labs drawn prior to chemo tomorrow as ordered  There is a Blood bank tube on hold in case pt needs more blood later this week following chemo  Port was deaccessed prior to patient discharge

## 2017-07-25 ENCOUNTER — APPOINTMENT (OUTPATIENT)
Dept: LAB | Facility: CLINIC | Age: 62
End: 2017-07-25
Payer: MEDICARE

## 2017-07-25 ENCOUNTER — TRANSCRIBE ORDERS (OUTPATIENT)
Dept: NON INVASIVE DIAGNOSTICS | Facility: HOSPITAL | Age: 62
End: 2017-07-25

## 2017-07-25 ENCOUNTER — HOSPITAL ENCOUNTER (OUTPATIENT)
Dept: INFUSION CENTER | Facility: CLINIC | Age: 62
Discharge: HOME | End: 2017-07-25
Payer: MEDICARE

## 2017-07-25 ENCOUNTER — GENERIC CONVERSION - ENCOUNTER (OUTPATIENT)
Dept: OTHER | Facility: OTHER | Age: 62
End: 2017-07-25

## 2017-07-25 VITALS
TEMPERATURE: 98.3 F | RESPIRATION RATE: 18 BRPM | SYSTOLIC BLOOD PRESSURE: 134 MMHG | DIASTOLIC BLOOD PRESSURE: 79 MMHG | HEART RATE: 101 BPM

## 2017-07-25 DIAGNOSIS — Z20.1 CONTACT WITH TUBERCULOSIS: ICD-10-CM

## 2017-07-25 DIAGNOSIS — Z20.1 CONTACT WITH TUBERCULOSIS: Primary | ICD-10-CM

## 2017-07-25 LAB
ABO GROUP BLD BPU: NORMAL
ALBUMIN SERPL BCP-MCNC: 3.9 G/DL (ref 3.2–5)
ALP SERPL-CCNC: 40 U/L (ref 46–116)
ALT SERPL W P-5'-P-CCNC: 14 U/L (ref 12–78)
ANION GAP SERPL CALCULATED.3IONS-SCNC: -12 MMOL/L (ref 4–13)
ANISOCYTOSIS BLD QL SMEAR: PRESENT
AST SERPL W P-5'-P-CCNC: 24 U/L (ref 5–45)
BASOPHILS # BLD AUTO: 0 THOUSAND/UL (ref 0–0.1)
BASOPHILS NFR MAR MANUAL: 0 % (ref 0–1)
BILIRUB SERPL-MCNC: 5.2 MG/DL (ref 0.2–1)
BPU ID: NORMAL
BUN SERPL-MCNC: 21 MG/DL (ref 5–25)
CALCIUM SERPL-MCNC: 8.8 MG/DL (ref 8.3–10.1)
CHLORIDE SERPL-SCNC: 112 MMOL/L (ref 98–108)
CO2 SERPL-SCNC: 30 MMOL/L (ref 21–32)
CREAT SERPL-MCNC: 1.3 MG/DL (ref 0.6–1.3)
CROSSMATCH: NORMAL
EOSINOPHIL # BLD AUTO: 0 THOUSAND/UL (ref 0–0.61)
EOSINOPHIL NFR BLD MANUAL: 0 % (ref 0–6)
ERYTHROCYTE [DISTWIDTH] IN BLOOD BY AUTOMATED COUNT: 27.3 % (ref 11.6–15.1)
GFR SERPL CREATININE-BSD FRML MDRD: 58 ML/MIN/1.73SQ M
GLUCOSE SERPL-MCNC: 135 MG/DL (ref 65–140)
HCT VFR BLD AUTO: 27.1 % (ref 36.5–49.3)
HGB BLD-MCNC: 8.7 G/DL (ref 12–17)
LG PLATELETS BLD QL SMEAR: PRESENT
LYMPHOCYTES # BLD AUTO: 0.4 THOUSAND/UL (ref 0.6–4.47)
LYMPHOCYTES # BLD AUTO: 8 % (ref 14–44)
MACROCYTES BLD QL AUTO: PRESENT
MCH RBC QN AUTO: 33.2 PG (ref 26.8–34.3)
MCHC RBC AUTO-ENTMCNC: 32.1 G/DL (ref 31.4–37.4)
MCV RBC AUTO: 104 FL (ref 82–98)
MONOCYTES # BLD AUTO: 0.1 THOUSAND/UL (ref 0–1.22)
MONOCYTES NFR BLD AUTO: 2 % (ref 4–12)
NEUTS BAND NFR BLD MANUAL: 0 % (ref 0–8)
NEUTS SEG # BLD: 4.5 THOUSAND/UL (ref 1.81–6.82)
NEUTS SEG NFR BLD AUTO: 90 % (ref 43–75)
NRBC BLD AUTO-RTO: 1 /100 WBC (ref 0–2)
OVALOCYTES BLD QL SMEAR: PRESENT
PLATELET # BLD AUTO: 165 THOUSANDS/UL (ref 149–390)
PLATELET BLD QL SMEAR: ADEQUATE
PMV BLD AUTO: 6.7 FL (ref 8.9–12.7)
POLYCHROMASIA BLD QL SMEAR: PRESENT
POTASSIUM SERPL-SCNC: 4.7 MMOL/L (ref 3.5–5.3)
PROT SERPL-MCNC: 5.6 G/DL (ref 6.4–8.2)
RBC # BLD AUTO: 2.62 MILLION/UL (ref 3.88–5.62)
SODIUM SERPL-SCNC: 130 MMOL/L (ref 136–145)
TOTAL CELLS COUNTED SPEC: 100
UNIT DISPENSE STATUS: NORMAL
UNIT PRODUCT CODE: NORMAL
UNIT RH: NORMAL
WBC # BLD AUTO: 5 THOUSAND/UL (ref 4.31–10.16)
WBC NRBC COR # BLD: 5 THOUSAND/UL (ref 4.31–10.16)

## 2017-07-25 PROCEDURE — 96413 CHEMO IV INFUSION 1 HR: CPT

## 2017-07-25 PROCEDURE — 86480 TB TEST CELL IMMUN MEASURE: CPT

## 2017-07-25 PROCEDURE — 36415 COLL VENOUS BLD VENIPUNCTURE: CPT

## 2017-07-25 PROCEDURE — 85007 BL SMEAR W/DIFF WBC COUNT: CPT | Performed by: INTERNAL MEDICINE

## 2017-07-25 PROCEDURE — 85027 COMPLETE CBC AUTOMATED: CPT | Performed by: INTERNAL MEDICINE

## 2017-07-25 PROCEDURE — 80053 COMPREHEN METABOLIC PANEL: CPT | Performed by: INTERNAL MEDICINE

## 2017-07-25 PROCEDURE — 96415 CHEMO IV INFUSION ADDL HR: CPT

## 2017-07-25 PROCEDURE — 96375 TX/PRO/DX INJ NEW DRUG ADDON: CPT

## 2017-07-25 PROCEDURE — 96367 TX/PROPH/DG ADDL SEQ IV INF: CPT

## 2017-07-25 RX ORDER — UREA 10 %
1 LOTION (ML) TOPICAL DAILY
COMMUNITY
End: 2017-10-04 | Stop reason: DRUGHIGH

## 2017-07-25 RX ORDER — SODIUM CHLORIDE 9 MG/ML
20 INJECTION, SOLUTION INTRAVENOUS CONTINUOUS
Status: DISCONTINUED | OUTPATIENT
Start: 2017-07-26 | End: 2017-07-29 | Stop reason: HOSPADM

## 2017-07-25 RX ORDER — ACETAMINOPHEN 325 MG/1
650 TABLET ORAL ONCE
Status: DISCONTINUED | OUTPATIENT
Start: 2017-07-26 | End: 2017-07-26

## 2017-07-25 RX ADMIN — OBINUTUZUMAB 100 MG: 1000 INJECTION, SOLUTION, CONCENTRATE INTRAVENOUS at 09:31

## 2017-07-25 RX ADMIN — DEXAMETHASONE SODIUM PHOSPHATE 20 MG: 10 INJECTION, SOLUTION INTRAMUSCULAR; INTRAVENOUS at 08:25

## 2017-07-25 RX ADMIN — DIPHENHYDRAMINE HYDROCHLORIDE 25 MG: 50 INJECTION, SOLUTION INTRAMUSCULAR; INTRAVENOUS at 08:40

## 2017-07-25 RX ADMIN — SODIUM CHLORIDE 20 ML/HR: 0.9 INJECTION, SOLUTION INTRAVENOUS at 08:31

## 2017-07-25 RX ADMIN — ACETAMINOPHEN 650 MG: 325 TABLET, FILM COATED ORAL at 08:28

## 2017-07-25 NOTE — PLAN OF CARE
Problem: Potential for Falls  Goal: Patient will remain free of falls  INTERVENTIONS:  - Assess patient frequently for physical needs  -  Identify cognitive and physical deficits and behaviors that affect risk of falls    -  [place] fall precautions as indicated by assessment   - Educate patient/family on patient safety including physical limitations  - Instruct patient to call for assistance with activity based on assessment  - Modify environment to reduce risk of injury  - Consider OT/PT consult to assist with strengthening/mobility   Outcome: Progressing

## 2017-07-26 ENCOUNTER — GENERIC CONVERSION - ENCOUNTER (OUTPATIENT)
Dept: OTHER | Facility: OTHER | Age: 62
End: 2017-07-26

## 2017-07-26 ENCOUNTER — HOSPITAL ENCOUNTER (OUTPATIENT)
Dept: INFUSION CENTER | Facility: CLINIC | Age: 62
Discharge: HOME | End: 2017-07-26
Payer: MEDICARE

## 2017-07-26 PROCEDURE — 96415 CHEMO IV INFUSION ADDL HR: CPT

## 2017-07-26 PROCEDURE — 96413 CHEMO IV INFUSION 1 HR: CPT

## 2017-07-26 PROCEDURE — 96375 TX/PRO/DX INJ NEW DRUG ADDON: CPT

## 2017-07-26 RX ORDER — ACETAMINOPHEN 325 MG/1
650 TABLET ORAL ONCE
Status: COMPLETED | OUTPATIENT
Start: 2017-07-26 | End: 2017-07-26

## 2017-07-26 RX ADMIN — SODIUM CHLORIDE 20 ML/HR: 0.9 INJECTION, SOLUTION INTRAVENOUS at 08:15

## 2017-07-26 RX ADMIN — DIPHENHYDRAMINE HYDROCHLORIDE 25 MG: 50 INJECTION, SOLUTION INTRAMUSCULAR; INTRAVENOUS at 08:30

## 2017-07-26 RX ADMIN — OBINUTUZUMAB 900 MG: 1000 INJECTION, SOLUTION, CONCENTRATE INTRAVENOUS at 09:44

## 2017-07-26 RX ADMIN — ACETAMINOPHEN 650 MG: 325 TABLET, FILM COATED ORAL at 08:20

## 2017-07-26 RX ADMIN — DEXAMETHASONE SODIUM PHOSPHATE 20 MG: 10 INJECTION, SOLUTION INTRAMUSCULAR; INTRAVENOUS at 08:15

## 2017-07-26 NOTE — PROGRESS NOTES
Patient tolerated day 2 of Gazyva infusion well with no complications  Pt is aware of next appointment

## 2017-07-26 NOTE — PLAN OF CARE
Problem: Potential for Falls  Goal: Patient will remain free of falls  INTERVENTIONS:  - Assess patient frequently for physical needs  -  Identify cognitive and physical deficits and behaviors that affect risk of falls    -  [place] fall precautions as indicated by assessment   - Educate patient/family on patient safety including physical limitations  - Instruct patient to call for assistance with activity based on assessment  - Modify environment to reduce risk of injury  - Consider OT/PT consult to assist with strengthening/mobility   Outcome: Progressing

## 2017-07-27 LAB
ANNOTATION COMMENT IMP: ABNORMAL
GAMMA INTERFERON BACKGROUND BLD IA-ACNC: 0.07 IU/ML
M TB IFN-G BLD-IMP: ABNORMAL
M TB IFN-G CD4+ BCKGRND COR BLD-ACNC: <0.01 IU/ML
M TB IFN-G CD4+ T-CELLS BLD-ACNC: 0.06 IU/ML
MITOGEN IGNF BLD-ACNC: 0.15 IU/ML
QUANTIFERON-TB GOLD IN TUBE: NORMAL
SERVICE CMNT-IMP: ABNORMAL

## 2017-07-28 ENCOUNTER — HOSPITAL ENCOUNTER (OUTPATIENT)
Dept: INFUSION CENTER | Facility: CLINIC | Age: 62
Discharge: HOME | End: 2017-07-28
Payer: MEDICARE

## 2017-07-28 LAB
ALBUMIN SERPL BCP-MCNC: 3.7 G/DL (ref 3.2–5)
ALP SERPL-CCNC: 46 U/L (ref 46–116)
ALT SERPL W P-5'-P-CCNC: 9 U/L (ref 12–78)
ANION GAP SERPL CALCULATED.3IONS-SCNC: 2 MMOL/L (ref 4–13)
ANISOCYTOSIS BLD QL SMEAR: PRESENT
AST SERPL W P-5'-P-CCNC: 18 U/L (ref 5–45)
BASOPHILS # BLD AUTO: 0 THOUSAND/UL (ref 0–0.1)
BASOPHILS NFR MAR MANUAL: 0 % (ref 0–1)
BILIRUB DIRECT SERPL-MCNC: 0.37 MG/DL (ref 0–0.2)
BILIRUB SERPL-MCNC: 2.8 MG/DL (ref 0.2–1)
BUN SERPL-MCNC: 22 MG/DL (ref 5–25)
CALCIUM SERPL-MCNC: 9.1 MG/DL (ref 8.3–10.1)
CHLORIDE SERPL-SCNC: 103 MMOL/L (ref 98–108)
CO2 SERPL-SCNC: 30 MMOL/L (ref 21–32)
CREAT SERPL-MCNC: 1.4 MG/DL (ref 0.6–1.3)
DACRYOCYTES BLD QL SMEAR: PRESENT
EOSINOPHIL # BLD AUTO: 0 THOUSAND/UL (ref 0–0.61)
EOSINOPHIL NFR BLD MANUAL: 0 % (ref 0–6)
ERYTHROCYTE [DISTWIDTH] IN BLOOD BY AUTOMATED COUNT: 25.8 % (ref 11.6–15.1)
GFR SERPL CREATININE-BSD FRML MDRD: 53 ML/MIN/1.73SQ M
GLUCOSE SERPL-MCNC: 113 MG/DL (ref 65–140)
HCT VFR BLD AUTO: 28.8 % (ref 36.5–49.3)
HGB BLD-MCNC: 9.4 G/DL (ref 12–17)
LYMPHOCYTES # BLD AUTO: 1.1 THOUSAND/UL (ref 0.6–4.47)
LYMPHOCYTES # BLD AUTO: 20 % (ref 14–44)
MACROCYTES BLD QL AUTO: PRESENT
MCH RBC QN AUTO: 35.1 PG (ref 26.8–34.3)
MCHC RBC AUTO-ENTMCNC: 32.6 G/DL (ref 31.4–37.4)
MCV RBC AUTO: 108 FL (ref 82–98)
MONOCYTES # BLD AUTO: 0.17 THOUSAND/UL (ref 0–1.22)
MONOCYTES NFR BLD AUTO: 3 % (ref 4–12)
NEUTS BAND NFR BLD MANUAL: 2 % (ref 0–8)
NEUTS SEG # BLD: 4.24 THOUSAND/UL (ref 1.81–6.82)
NEUTS SEG NFR BLD AUTO: 75 % (ref 43–75)
OVALOCYTES BLD QL SMEAR: PRESENT
PLATELET # BLD AUTO: 113 THOUSANDS/UL (ref 149–390)
PLATELET BLD QL SMEAR: ABNORMAL
PMV BLD AUTO: 6.7 FL (ref 8.9–12.7)
POTASSIUM SERPL-SCNC: 3.8 MMOL/L (ref 3.5–5.3)
PROT SERPL-MCNC: 5.6 G/DL (ref 6.4–8.2)
RBC # BLD AUTO: 2.67 MILLION/UL (ref 3.88–5.62)
RETICS # AUTO: ABNORMAL 10*3/UL (ref 14356–105094)
RETICS # CALC: 16.85 % (ref 0.37–1.87)
SODIUM SERPL-SCNC: 135 MMOL/L (ref 136–145)
TOTAL CELLS COUNTED SPEC: 100
WBC # BLD AUTO: 5.5 THOUSAND/UL (ref 4.31–10.16)
WBC NRBC COR # BLD: 5.5 THOUSAND/UL (ref 4.31–10.16)

## 2017-07-28 PROCEDURE — 80053 COMPREHEN METABOLIC PANEL: CPT | Performed by: INTERNAL MEDICINE

## 2017-07-28 PROCEDURE — 85007 BL SMEAR W/DIFF WBC COUNT: CPT | Performed by: INTERNAL MEDICINE

## 2017-07-28 PROCEDURE — 85027 COMPLETE CBC AUTOMATED: CPT | Performed by: INTERNAL MEDICINE

## 2017-07-28 PROCEDURE — 82248 BILIRUBIN DIRECT: CPT | Performed by: INTERNAL MEDICINE

## 2017-07-28 PROCEDURE — 85045 AUTOMATED RETICULOCYTE COUNT: CPT | Performed by: INTERNAL MEDICINE

## 2017-07-28 NOTE — PLAN OF CARE
Problem: Potential for Falls  Goal: Patient will remain free of falls  INTERVENTIONS:  - Assess patient frequently for physical needs  -  Identify cognitive and physical deficits and behaviors that affect risk of falls  -  [place] fall precautions as indicated by assessment   - Educate patient/family on patient safety including physical limitations  - Instruct patient to call for assistance with activity based on assessment  - Modify environment to reduce risk of injury  - Consider OT/PT consult to assist with strengthening/mobility   Outcome: Progressing      Problem: SAFETY ADULT  Goal: Patient will remain free of falls  INTERVENTIONS:  - Assess patient frequently for physical needs  -  Identify cognitive and physical deficits and behaviors that affect risk of falls  -  [place] fall precautions as indicated by assessment   - Educate patient/family on patient safety including physical limitations  - Instruct patient to call for assistance with activity based on assessment  - Modify environment to reduce risk of injury  - Consider OT/PT consult to assist with strengthening/mobility   Outcome: Progressing      Problem: Knowledge Deficit  Goal: Patient/family/caregiver demonstrates understanding of disease process, treatment plan, medications, and discharge instructions  Complete learning assessment and assess knowledge base    Interventions:  - Provide teaching at level of understanding  - Provide teaching via preferred learning methods  Outcome: Progressing

## 2017-07-28 NOTE — PROGRESS NOTES
Patient arrived for central line bloodwork  Patient offers no complaints  Blood work drawn with port access  Port flushed per protocol without heparin due to patient allergy  Patient tolerated well

## 2017-07-31 ENCOUNTER — HOSPITAL ENCOUNTER (OUTPATIENT)
Dept: INFUSION CENTER | Facility: CLINIC | Age: 62
Discharge: HOME | End: 2017-07-31
Payer: MEDICARE

## 2017-07-31 LAB
ALBUMIN SERPL BCP-MCNC: 3.5 G/DL (ref 3.2–5)
ALP SERPL-CCNC: 36 U/L (ref 46–116)
ALT SERPL W P-5'-P-CCNC: 13 U/L (ref 12–78)
ANION GAP SERPL CALCULATED.3IONS-SCNC: -3 MMOL/L (ref 4–13)
ANISOCYTOSIS BLD QL SMEAR: PRESENT
AST SERPL W P-5'-P-CCNC: 17 U/L (ref 5–45)
BASOPHILS # BLD AUTO: 0 THOUSAND/UL (ref 0–0.1)
BASOPHILS NFR MAR MANUAL: 0 % (ref 0–1)
BILIRUB DIRECT SERPL-MCNC: 0.39 MG/DL (ref 0–0.2)
BILIRUB SERPL-MCNC: 2.5 MG/DL (ref 0.2–1)
BUN SERPL-MCNC: 23 MG/DL (ref 5–25)
CALCIUM SERPL-MCNC: 8.6 MG/DL (ref 8.3–10.1)
CHLORIDE SERPL-SCNC: 109 MMOL/L (ref 98–108)
CO2 SERPL-SCNC: 28 MMOL/L (ref 21–32)
CREAT SERPL-MCNC: 1.2 MG/DL (ref 0.6–1.3)
DACRYOCYTES BLD QL SMEAR: PRESENT
EOSINOPHIL # BLD AUTO: 0.04 THOUSAND/UL (ref 0–0.61)
EOSINOPHIL NFR BLD MANUAL: 1 % (ref 0–6)
ERYTHROCYTE [DISTWIDTH] IN BLOOD BY AUTOMATED COUNT: 21 % (ref 11.6–15.1)
GFR SERPL CREATININE-BSD FRML MDRD: 64 ML/MIN/1.73SQ M
GLUCOSE SERPL-MCNC: 105 MG/DL (ref 65–140)
HCT VFR BLD AUTO: 25.3 % (ref 36.5–49.3)
HGB BLD-MCNC: 8.4 G/DL (ref 12–17)
LYMPHOCYTES # BLD AUTO: 0.86 THOUSAND/UL (ref 0.6–4.47)
LYMPHOCYTES # BLD AUTO: 20 % (ref 14–44)
MCH RBC QN AUTO: 37.1 PG (ref 26.8–34.3)
MCHC RBC AUTO-ENTMCNC: 33.2 G/DL (ref 31.4–37.4)
MCV RBC AUTO: 112 FL (ref 82–98)
MONOCYTES # BLD AUTO: 0.17 THOUSAND/UL (ref 0–1.22)
MONOCYTES NFR BLD AUTO: 4 % (ref 4–12)
NEUTS BAND NFR BLD MANUAL: 2 % (ref 0–8)
NEUTS SEG # BLD: 3.23 THOUSAND/UL (ref 1.81–6.82)
NEUTS SEG NFR BLD AUTO: 73 % (ref 43–75)
PLATELET # BLD AUTO: 90 THOUSANDS/UL (ref 149–390)
PLATELET BLD QL SMEAR: ABNORMAL
PMV BLD AUTO: 7 FL (ref 8.9–12.7)
POTASSIUM SERPL-SCNC: 3.8 MMOL/L (ref 3.5–5.3)
PROT SERPL-MCNC: 5.3 G/DL (ref 6.4–8.2)
RBC # BLD AUTO: 2.27 MILLION/UL (ref 3.88–5.62)
RETICS # AUTO: ABNORMAL 10*3/UL (ref 14356–105094)
RETICS # CALC: 14.48 % (ref 0.37–1.87)
SODIUM SERPL-SCNC: 134 MMOL/L (ref 136–145)
TOTAL CELLS COUNTED SPEC: 100
WBC # BLD AUTO: 4.3 THOUSAND/UL (ref 4.31–10.16)
WBC NRBC COR # BLD: 4.3 THOUSAND/UL (ref 4.31–10.16)

## 2017-07-31 PROCEDURE — 82248 BILIRUBIN DIRECT: CPT | Performed by: INTERNAL MEDICINE

## 2017-07-31 PROCEDURE — 80053 COMPREHEN METABOLIC PANEL: CPT | Performed by: INTERNAL MEDICINE

## 2017-07-31 PROCEDURE — 85027 COMPLETE CBC AUTOMATED: CPT | Performed by: INTERNAL MEDICINE

## 2017-07-31 PROCEDURE — 85007 BL SMEAR W/DIFF WBC COUNT: CPT | Performed by: INTERNAL MEDICINE

## 2017-07-31 PROCEDURE — 85045 AUTOMATED RETICULOCYTE COUNT: CPT | Performed by: INTERNAL MEDICINE

## 2017-07-31 RX ORDER — ACETAMINOPHEN 325 MG/1
650 TABLET ORAL ONCE
Status: COMPLETED | OUTPATIENT
Start: 2017-08-01 | End: 2017-08-01

## 2017-07-31 RX ORDER — SODIUM CHLORIDE 9 MG/ML
20 INJECTION, SOLUTION INTRAVENOUS CONTINUOUS
Status: DISCONTINUED | OUTPATIENT
Start: 2017-08-01 | End: 2017-08-04 | Stop reason: HOSPADM

## 2017-07-31 NOTE — PROGRESS NOTES
Port flushed per protocol and central labs drawn per order  Spoke with Marlena Ward/Dr Mejia and advised of platelets 90 for chemo tomorrow  Awaiting phone call back  Patient otherwise offers no complaints today

## 2017-07-31 NOTE — PLAN OF CARE
Problem: Potential for Falls  Goal: Patient will remain free of falls  INTERVENTIONS:  - Assess patient frequently for physical needs  -  Identify cognitive and physical deficits and behaviors that affect risk of falls    -  [place] fall precautions as indicated by assessment   - Educate patient/family on patient safety including physical limitations  - Instruct patient to call for assistance with activity based on assessment  - Modify environment to reduce risk of injury  - Consider OT/PT consult to assist with strengthening/mobility   Outcome: Progressing

## 2017-08-01 ENCOUNTER — HOSPITAL ENCOUNTER (OUTPATIENT)
Dept: INFUSION CENTER | Facility: CLINIC | Age: 62
Discharge: HOME | End: 2017-08-01
Payer: MEDICARE

## 2017-08-01 VITALS
SYSTOLIC BLOOD PRESSURE: 120 MMHG | HEART RATE: 80 BPM | DIASTOLIC BLOOD PRESSURE: 58 MMHG | TEMPERATURE: 98.3 F | RESPIRATION RATE: 18 BRPM

## 2017-08-01 PROCEDURE — 96413 CHEMO IV INFUSION 1 HR: CPT

## 2017-08-01 PROCEDURE — 96367 TX/PROPH/DG ADDL SEQ IV INF: CPT

## 2017-08-01 PROCEDURE — 96415 CHEMO IV INFUSION ADDL HR: CPT

## 2017-08-01 PROCEDURE — 96375 TX/PRO/DX INJ NEW DRUG ADDON: CPT

## 2017-08-01 RX ADMIN — OBINUTUZUMAB 1000 MG: 1000 INJECTION, SOLUTION, CONCENTRATE INTRAVENOUS at 09:52

## 2017-08-01 RX ADMIN — DIPHENHYDRAMINE HYDROCHLORIDE 25 MG: 50 INJECTION, SOLUTION INTRAMUSCULAR; INTRAVENOUS at 08:50

## 2017-08-01 RX ADMIN — SODIUM CHLORIDE 20 ML/HR: 0.9 INJECTION, SOLUTION INTRAVENOUS at 08:33

## 2017-08-01 RX ADMIN — DEXAMETHASONE SODIUM PHOSPHATE 20 MG: 10 INJECTION, SOLUTION INTRAMUSCULAR; INTRAVENOUS at 08:33

## 2017-08-01 RX ADMIN — ACETAMINOPHEN 650 MG: 325 TABLET, FILM COATED ORAL at 08:25

## 2017-08-01 NOTE — PROGRESS NOTES
Pt arrived to unit without complaint  Plt count=90,000, Dr Yulissa Archibald aware and ok to treat given  Pt tolerated 1000mg dose Gazyva as ordered today, Jeana Huff was titrated per protocol  Pt without any s/s adverse reaction  Pt left unit in stable condition without question or concern, denies AVS today  Pt aware of appt on Friday for bloodwork

## 2017-08-04 ENCOUNTER — HOSPITAL ENCOUNTER (OUTPATIENT)
Dept: INFUSION CENTER | Facility: CLINIC | Age: 62
Discharge: HOME | End: 2017-08-04
Payer: MEDICARE

## 2017-08-04 LAB
ALBUMIN SERPL BCP-MCNC: 3.4 G/DL (ref 3.2–5)
ALP SERPL-CCNC: 38 U/L (ref 46–116)
ALT SERPL W P-5'-P-CCNC: 12 U/L (ref 12–78)
ANION GAP SERPL CALCULATED.3IONS-SCNC: 1 MMOL/L (ref 4–13)
ANISOCYTOSIS BLD QL SMEAR: PRESENT
AST SERPL W P-5'-P-CCNC: 17 U/L (ref 5–45)
BASOPHILS # BLD AUTO: 0 THOUSAND/UL (ref 0–0.1)
BASOPHILS NFR MAR MANUAL: 0 % (ref 0–1)
BILIRUB DIRECT SERPL-MCNC: 0.32 MG/DL (ref 0–0.2)
BILIRUB SERPL-MCNC: 2.6 MG/DL (ref 0.2–1)
BUN SERPL-MCNC: 18 MG/DL (ref 5–25)
CALCIUM SERPL-MCNC: 8.7 MG/DL (ref 8.3–10.1)
CHLORIDE SERPL-SCNC: 108 MMOL/L (ref 98–108)
CO2 SERPL-SCNC: 28 MMOL/L (ref 21–32)
CREAT SERPL-MCNC: 1.2 MG/DL (ref 0.6–1.3)
EOSINOPHIL # BLD AUTO: 0 THOUSAND/UL (ref 0–0.61)
EOSINOPHIL NFR BLD MANUAL: 0 % (ref 0–6)
ERYTHROCYTE [DISTWIDTH] IN BLOOD BY AUTOMATED COUNT: 19.9 % (ref 11.6–15.1)
GFR SERPL CREATININE-BSD FRML MDRD: 64 ML/MIN/1.73SQ M
GLUCOSE SERPL-MCNC: 98 MG/DL (ref 65–140)
HCT VFR BLD AUTO: 27.4 % (ref 36.5–49.3)
HGB BLD-MCNC: 8.8 G/DL (ref 12–17)
LYMPHOCYTES # BLD AUTO: 0.62 THOUSAND/UL (ref 0.6–4.47)
LYMPHOCYTES # BLD AUTO: 15 % (ref 14–44)
MACROCYTES BLD QL AUTO: PRESENT
MCH RBC QN AUTO: 36.8 PG (ref 26.8–34.3)
MCHC RBC AUTO-ENTMCNC: 32.2 G/DL (ref 31.4–37.4)
MCV RBC AUTO: 114 FL (ref 82–98)
MONOCYTES # BLD AUTO: 0.16 THOUSAND/UL (ref 0–1.22)
MONOCYTES NFR BLD AUTO: 4 % (ref 4–12)
NEUTS BAND NFR BLD MANUAL: 0 % (ref 0–8)
NEUTS SEG # BLD: 3.32 THOUSAND/UL (ref 1.81–6.82)
NEUTS SEG NFR BLD AUTO: 81 % (ref 43–75)
PLATELET # BLD AUTO: 72 THOUSANDS/UL (ref 149–390)
PLATELET BLD QL SMEAR: ABNORMAL
PMV BLD AUTO: 7.2 FL (ref 8.9–12.7)
POLYCHROMASIA BLD QL SMEAR: PRESENT
POTASSIUM SERPL-SCNC: 3.7 MMOL/L (ref 3.5–5.3)
PROT SERPL-MCNC: 5.1 G/DL (ref 6.4–8.2)
RBC # BLD AUTO: 2.4 MILLION/UL (ref 3.88–5.62)
RETICS # AUTO: ABNORMAL 10*3/UL (ref 14356–105094)
RETICS # CALC: 13.73 % (ref 0.37–1.87)
SODIUM SERPL-SCNC: 137 MMOL/L (ref 136–145)
TOTAL CELLS COUNTED SPEC: 100
WBC # BLD AUTO: 4.1 THOUSAND/UL (ref 4.31–10.16)
WBC NRBC COR # BLD: 4.1 THOUSAND/UL (ref 4.31–10.16)

## 2017-08-04 PROCEDURE — 82248 BILIRUBIN DIRECT: CPT | Performed by: INTERNAL MEDICINE

## 2017-08-04 PROCEDURE — 85027 COMPLETE CBC AUTOMATED: CPT | Performed by: INTERNAL MEDICINE

## 2017-08-04 PROCEDURE — 85045 AUTOMATED RETICULOCYTE COUNT: CPT | Performed by: INTERNAL MEDICINE

## 2017-08-04 PROCEDURE — 85007 BL SMEAR W/DIFF WBC COUNT: CPT | Performed by: INTERNAL MEDICINE

## 2017-08-04 PROCEDURE — 80053 COMPREHEN METABOLIC PANEL: CPT | Performed by: INTERNAL MEDICINE

## 2017-08-04 NOTE — PROGRESS NOTES
Patient presents for central line blood work  Patient offers no complaints  Blood work drawn with port access  Port flushed per protocol without heparin due to patient allergy

## 2017-08-07 ENCOUNTER — HOSPITAL ENCOUNTER (OUTPATIENT)
Dept: INFUSION CENTER | Facility: CLINIC | Age: 62
Discharge: HOME | End: 2017-08-07
Payer: MEDICARE

## 2017-08-07 LAB
ALBUMIN SERPL BCP-MCNC: 3.3 G/DL (ref 3.2–5)
ALP SERPL-CCNC: 34 U/L (ref 46–116)
ALT SERPL W P-5'-P-CCNC: 14 U/L (ref 12–78)
ANION GAP SERPL CALCULATED.3IONS-SCNC: 0 MMOL/L (ref 4–13)
ANISOCYTOSIS BLD QL SMEAR: PRESENT
AST SERPL W P-5'-P-CCNC: 17 U/L (ref 5–45)
BASOPHILS # BLD AUTO: 0 THOUSAND/UL (ref 0–0.1)
BASOPHILS NFR MAR MANUAL: 0 % (ref 0–1)
BILIRUB DIRECT SERPL-MCNC: 0.36 MG/DL (ref 0–0.2)
BILIRUB SERPL-MCNC: 2.4 MG/DL (ref 0.2–1)
BUN SERPL-MCNC: 18 MG/DL (ref 5–25)
CALCIUM SERPL-MCNC: 8.8 MG/DL (ref 8.3–10.1)
CHLORIDE SERPL-SCNC: 108 MMOL/L (ref 98–108)
CO2 SERPL-SCNC: 28 MMOL/L (ref 21–32)
CREAT SERPL-MCNC: 0.9 MG/DL (ref 0.6–1.3)
DACRYOCYTES BLD QL SMEAR: PRESENT
EOSINOPHIL # BLD AUTO: 0 THOUSAND/UL (ref 0–0.61)
EOSINOPHIL NFR BLD MANUAL: 0 % (ref 0–6)
ERYTHROCYTE [DISTWIDTH] IN BLOOD BY AUTOMATED COUNT: 16.5 % (ref 11.6–15.1)
GFR SERPL CREATININE-BSD FRML MDRD: 91 ML/MIN/1.73SQ M
GLUCOSE SERPL-MCNC: 80 MG/DL (ref 65–140)
HCT VFR BLD AUTO: 28.2 % (ref 36.5–49.3)
HGB BLD-MCNC: 9 G/DL (ref 12–17)
LYMPHOCYTES # BLD AUTO: 0.73 THOUSAND/UL (ref 0.6–4.47)
LYMPHOCYTES # BLD AUTO: 17 % (ref 14–44)
MACROCYTES BLD QL AUTO: PRESENT
MCH RBC QN AUTO: 36.6 PG (ref 26.8–34.3)
MCHC RBC AUTO-ENTMCNC: 32 G/DL (ref 31.4–37.4)
MCV RBC AUTO: 114 FL (ref 82–98)
MONOCYTES # BLD AUTO: 0.04 THOUSAND/UL (ref 0–1.22)
MONOCYTES NFR BLD AUTO: 1 % (ref 4–12)
NEUTS BAND NFR BLD MANUAL: 0 % (ref 0–8)
NEUTS SEG # BLD: 3.53 THOUSAND/UL (ref 1.81–6.82)
NEUTS SEG NFR BLD AUTO: 82 % (ref 43–75)
OVALOCYTES BLD QL SMEAR: PRESENT
PLATELET # BLD AUTO: 70 THOUSANDS/UL (ref 149–390)
PLATELET BLD QL SMEAR: ABNORMAL
PMV BLD AUTO: 7.4 FL (ref 8.9–12.7)
POLYCHROMASIA BLD QL SMEAR: PRESENT
POTASSIUM SERPL-SCNC: 3.7 MMOL/L (ref 3.5–5.3)
PROT SERPL-MCNC: 5.1 G/DL (ref 6.4–8.2)
RBC # BLD AUTO: 2.47 MILLION/UL (ref 3.88–5.62)
RETICS # AUTO: ABNORMAL 10*3/UL (ref 14356–105094)
RETICS # CALC: 12.1 % (ref 0.37–1.87)
SODIUM SERPL-SCNC: 136 MMOL/L (ref 136–145)
TOTAL CELLS COUNTED SPEC: 100
WBC # BLD AUTO: 4.3 THOUSAND/UL (ref 4.31–10.16)
WBC NRBC COR # BLD: 4.3 THOUSAND/UL (ref 4.31–10.16)

## 2017-08-07 PROCEDURE — 85045 AUTOMATED RETICULOCYTE COUNT: CPT | Performed by: INTERNAL MEDICINE

## 2017-08-07 PROCEDURE — 85007 BL SMEAR W/DIFF WBC COUNT: CPT | Performed by: INTERNAL MEDICINE

## 2017-08-07 PROCEDURE — 80053 COMPREHEN METABOLIC PANEL: CPT | Performed by: INTERNAL MEDICINE

## 2017-08-07 PROCEDURE — 82248 BILIRUBIN DIRECT: CPT | Performed by: INTERNAL MEDICINE

## 2017-08-07 PROCEDURE — 85027 COMPLETE CBC AUTOMATED: CPT | Performed by: INTERNAL MEDICINE

## 2017-08-07 RX ORDER — ACETAMINOPHEN 325 MG/1
650 TABLET ORAL ONCE
Status: COMPLETED | OUTPATIENT
Start: 2017-08-08 | End: 2017-08-08

## 2017-08-07 RX ORDER — SODIUM CHLORIDE 9 MG/ML
20 INJECTION, SOLUTION INTRAVENOUS CONTINUOUS
Status: DISCONTINUED | OUTPATIENT
Start: 2017-08-08 | End: 2017-08-11 | Stop reason: HOSPADM

## 2017-08-07 NOTE — PROGRESS NOTES
Spoke with Desiree [place] Nurse for Dr Lisa Brownlee via telephone  Informed her pt PLT count is 70,000   Per office pt ok for treatment Day 15 Gazyva tomorrow ok to treat orders to follow

## 2017-08-08 ENCOUNTER — HOSPITAL ENCOUNTER (OUTPATIENT)
Dept: INFUSION CENTER | Facility: CLINIC | Age: 62
Discharge: HOME | End: 2017-08-08
Payer: MEDICARE

## 2017-08-08 VITALS
SYSTOLIC BLOOD PRESSURE: 137 MMHG | BODY MASS INDEX: 28.49 KG/M2 | WEIGHT: 210.1 LBS | RESPIRATION RATE: 18 BRPM | TEMPERATURE: 96.1 F | HEART RATE: 81 BPM | DIASTOLIC BLOOD PRESSURE: 65 MMHG

## 2017-08-08 PROCEDURE — 96375 TX/PRO/DX INJ NEW DRUG ADDON: CPT

## 2017-08-08 PROCEDURE — 96415 CHEMO IV INFUSION ADDL HR: CPT

## 2017-08-08 PROCEDURE — 96413 CHEMO IV INFUSION 1 HR: CPT

## 2017-08-08 RX ADMIN — OBINUTUZUMAB 1000 MG: 1000 INJECTION, SOLUTION, CONCENTRATE INTRAVENOUS at 09:17

## 2017-08-08 RX ADMIN — DIPHENHYDRAMINE HYDROCHLORIDE 25 MG: 50 INJECTION, SOLUTION INTRAMUSCULAR; INTRAVENOUS at 08:50

## 2017-08-08 RX ADMIN — ACETAMINOPHEN 650 MG: 325 TABLET, FILM COATED ORAL at 08:37

## 2017-08-08 RX ADMIN — SODIUM CHLORIDE 20 ML/HR: 0.9 INJECTION, SOLUTION INTRAVENOUS at 08:35

## 2017-08-08 RX ADMIN — DEXAMETHASONE SODIUM PHOSPHATE 20 MG: 10 INJECTION, SOLUTION INTRAMUSCULAR; INTRAVENOUS at 08:35

## 2017-08-08 NOTE — PLAN OF CARE
Problem: Potential for Falls  Goal: Patient will remain free of falls  INTERVENTIONS:  - Assess patient frequently for physical needs  -  Identify cognitive and physical deficits and behaviors that affect risk of falls  -  [place] fall precautions as indicated by assessment   - Educate patient/family on patient safety including physical limitations  - Instruct patient to call for assistance with activity based on assessment  - Modify environment to reduce risk of injury  - Consider OT/PT consult to assist with strengthening/mobility    Outcome: Progressing      Problem: SAFETY ADULT  Goal: Patient will remain free of falls  INTERVENTIONS:  - Assess patient frequently for physical needs  -  Identify cognitive and physical deficits and behaviors that affect risk of falls    -  [place] fall precautions as indicated by assessment   - Educate patient/family on patient safety including physical limitations  - Instruct patient to call for assistance with activity based on assessment  - Modify environment to reduce risk of injury  - Consider OT/PT consult to assist with strengthening/mobility    Outcome: Progressing

## 2017-08-08 NOTE — PROGRESS NOTES
Pt arrived for Gyzava with no complaints  Received gyzava with no adverse events  Aware of future appointments   Refused AVS

## 2017-08-14 ENCOUNTER — HOSPITAL ENCOUNTER (OUTPATIENT)
Dept: INFUSION CENTER | Facility: CLINIC | Age: 62
Discharge: HOME | End: 2017-08-14
Payer: MEDICARE

## 2017-08-14 LAB
ALBUMIN SERPL BCP-MCNC: 3.6 G/DL (ref 3.2–5)
ALP SERPL-CCNC: 33 U/L (ref 46–116)
ALT SERPL W P-5'-P-CCNC: 15 U/L (ref 12–78)
ANION GAP SERPL CALCULATED.3IONS-SCNC: -1 MMOL/L (ref 4–13)
ANISOCYTOSIS BLD QL SMEAR: PRESENT
AST SERPL W P-5'-P-CCNC: 22 U/L (ref 5–45)
BASOPHILS # BLD AUTO: 0 THOUSAND/UL (ref 0–0.1)
BASOPHILS NFR MAR MANUAL: 0 % (ref 0–1)
BILIRUB DIRECT SERPL-MCNC: 0.4 MG/DL (ref 0–0.2)
BILIRUB SERPL-MCNC: 1.7 MG/DL (ref 0.2–1)
BUN SERPL-MCNC: 15 MG/DL (ref 5–25)
CALCIUM SERPL-MCNC: 8.4 MG/DL (ref 8.3–10.1)
CHLORIDE SERPL-SCNC: 108 MMOL/L (ref 98–108)
CO2 SERPL-SCNC: 27 MMOL/L (ref 21–32)
CREAT SERPL-MCNC: 1.1 MG/DL (ref 0.6–1.3)
DACRYOCYTES BLD QL SMEAR: PRESENT
EOSINOPHIL # BLD AUTO: 0 THOUSAND/UL (ref 0–0.61)
EOSINOPHIL NFR BLD MANUAL: 0 % (ref 0–6)
ERYTHROCYTE [DISTWIDTH] IN BLOOD BY AUTOMATED COUNT: 14.1 % (ref 11.6–15.1)
GFR SERPL CREATININE-BSD FRML MDRD: 72 ML/MIN/1.73SQ M
GLUCOSE SERPL-MCNC: 96 MG/DL (ref 65–140)
HCT VFR BLD AUTO: 30.7 % (ref 36.5–49.3)
HGB BLD-MCNC: 10 G/DL (ref 12–17)
IGG SERPL-MCNC: 215 MG/DL (ref 700–1600)
LDH SERPL-CCNC: 153 U/L (ref 81–234)
LYMPHOCYTES # BLD AUTO: 0.59 THOUSAND/UL (ref 0.6–4.47)
LYMPHOCYTES # BLD AUTO: 19 % (ref 14–44)
MACROCYTES BLD QL AUTO: PRESENT
MCH RBC QN AUTO: 35.6 PG (ref 26.8–34.3)
MCHC RBC AUTO-ENTMCNC: 32.5 G/DL (ref 31.4–37.4)
MCV RBC AUTO: 110 FL (ref 82–98)
METAMYELOCYTES NFR BLD MANUAL: 1 % (ref 0–1)
MONOCYTES # BLD AUTO: 0.06 THOUSAND/UL (ref 0–1.22)
MONOCYTES NFR BLD AUTO: 2 % (ref 4–12)
MYELOCYTES NFR BLD MANUAL: 1 % (ref 0–1)
NEUTS BAND NFR BLD MANUAL: 2 % (ref 0–8)
NEUTS SEG # BLD: 2.39 THOUSAND/UL (ref 1.81–6.82)
NEUTS SEG NFR BLD AUTO: 75 % (ref 43–75)
PLATELET # BLD AUTO: 80 THOUSANDS/UL (ref 149–390)
PLATELET BLD QL SMEAR: ABNORMAL
PMV BLD AUTO: 7 FL (ref 8.9–12.7)
POTASSIUM SERPL-SCNC: 3.9 MMOL/L (ref 3.5–5.3)
PROT SERPL-MCNC: 5.3 G/DL (ref 6.4–8.2)
RBC # BLD AUTO: 2.8 MILLION/UL (ref 3.88–5.62)
RETICS # AUTO: ABNORMAL 10*3/UL (ref 14356–105094)
RETICS # CALC: 7.9 % (ref 0.37–1.87)
SODIUM SERPL-SCNC: 134 MMOL/L (ref 136–145)
TOTAL CELLS COUNTED SPEC: 100
URATE SERPL-MCNC: 4 MG/DL (ref 4.2–8)
WBC # BLD AUTO: 3.1 THOUSAND/UL (ref 4.31–10.16)
WBC NRBC COR # BLD: 3.1 THOUSAND/UL (ref 4.31–10.16)

## 2017-08-14 PROCEDURE — 83615 LACTATE (LD) (LDH) ENZYME: CPT | Performed by: INTERNAL MEDICINE

## 2017-08-14 PROCEDURE — 84550 ASSAY OF BLOOD/URIC ACID: CPT | Performed by: INTERNAL MEDICINE

## 2017-08-14 PROCEDURE — 82248 BILIRUBIN DIRECT: CPT | Performed by: INTERNAL MEDICINE

## 2017-08-14 PROCEDURE — 85027 COMPLETE CBC AUTOMATED: CPT | Performed by: INTERNAL MEDICINE

## 2017-08-14 PROCEDURE — 82784 ASSAY IGA/IGD/IGG/IGM EACH: CPT | Performed by: INTERNAL MEDICINE

## 2017-08-14 PROCEDURE — 85007 BL SMEAR W/DIFF WBC COUNT: CPT | Performed by: INTERNAL MEDICINE

## 2017-08-14 PROCEDURE — 82232 ASSAY OF BETA-2 PROTEIN: CPT | Performed by: INTERNAL MEDICINE

## 2017-08-14 PROCEDURE — 80053 COMPREHEN METABOLIC PANEL: CPT | Performed by: INTERNAL MEDICINE

## 2017-08-14 PROCEDURE — 85045 AUTOMATED RETICULOCYTE COUNT: CPT | Performed by: INTERNAL MEDICINE

## 2017-08-14 NOTE — PLAN OF CARE
Problem: Potential for Falls  Goal: Patient will remain free of falls  INTERVENTIONS:  - Assess patient frequently for physical needs  -  Identify cognitive and physical deficits and behaviors that affect risk of falls    -  [place] fall precautions as indicated by assessment   - Educate patient/family on patient safety including physical limitations  - Instruct patient to call for assistance with activity based on assessment  - Modify environment to reduce risk of injury  - Consider OT/PT consult to assist with strengthening/mobility   Outcome: Progressing

## 2017-08-14 NOTE — PROGRESS NOTES
Pt here for central labs from port a cath  Weekly and monthly labs were drawn and sent to lab  Pt's port flushed with saline only

## 2017-08-15 LAB — B2 MICROGLOB SERPL-MCNC: 2.5 MG/L (ref 0.6–2.4)

## 2017-08-16 ENCOUNTER — GENERIC CONVERSION - ENCOUNTER (OUTPATIENT)
Dept: OTHER | Facility: OTHER | Age: 62
End: 2017-08-16

## 2017-08-16 ENCOUNTER — ALLSCRIPTS OFFICE VISIT (OUTPATIENT)
Dept: OTHER | Facility: OTHER | Age: 62
End: 2017-08-16

## 2017-08-21 ENCOUNTER — HOSPITAL ENCOUNTER (OUTPATIENT)
Dept: INFUSION CENTER | Facility: CLINIC | Age: 62
Discharge: HOME | End: 2017-08-21
Payer: MEDICARE

## 2017-08-21 ENCOUNTER — ALLSCRIPTS OFFICE VISIT (OUTPATIENT)
Dept: OTHER | Facility: OTHER | Age: 62
End: 2017-08-21

## 2017-08-21 DIAGNOSIS — B00.89 OTHER HERPESVIRAL INFECTION: ICD-10-CM

## 2017-08-21 DIAGNOSIS — D58.9 HEREDITARY HEMOLYTIC ANEMIA (HCC): ICD-10-CM

## 2017-08-21 DIAGNOSIS — C91.10 CHRONIC LYMPHOCYTIC LEUKEMIA OF B-CELL TYPE NOT HAVING ACHIEVED REMISSION (HCC): ICD-10-CM

## 2017-08-21 LAB
ALBUMIN SERPL BCP-MCNC: 3.7 G/DL (ref 3.2–5)
ALP SERPL-CCNC: 34 U/L (ref 46–116)
ALT SERPL W P-5'-P-CCNC: 18 U/L (ref 12–78)
ANION GAP SERPL CALCULATED.3IONS-SCNC: 2 MMOL/L (ref 4–13)
AST SERPL W P-5'-P-CCNC: 20 U/L (ref 5–45)
BASOPHILS # BLD AUTO: 0 THOUSANDS/ΜL (ref 0–0.1)
BASOPHILS NFR BLD AUTO: 0 % (ref 0–1)
BILIRUB DIRECT SERPL-MCNC: 0.4 MG/DL (ref 0–0.2)
BILIRUB SERPL-MCNC: 1.8 MG/DL (ref 0.2–1)
BUN SERPL-MCNC: 14 MG/DL (ref 5–25)
CALCIUM SERPL-MCNC: 9 MG/DL (ref 8.3–10.1)
CHLORIDE SERPL-SCNC: 108 MMOL/L (ref 98–108)
CO2 SERPL-SCNC: 27 MMOL/L (ref 21–32)
CREAT SERPL-MCNC: 1.1 MG/DL (ref 0.6–1.3)
EOSINOPHIL # BLD AUTO: 0.01 THOUSAND/ΜL (ref 0–0.61)
EOSINOPHIL NFR BLD AUTO: 0 % (ref 0–6)
ERYTHROCYTE [DISTWIDTH] IN BLOOD BY AUTOMATED COUNT: 12.5 % (ref 11.6–15.1)
GFR SERPL CREATININE-BSD FRML MDRD: 71 ML/MIN/1.73SQ M
GLUCOSE SERPL-MCNC: 147 MG/DL (ref 65–140)
HCT VFR BLD AUTO: 34.8 % (ref 36.5–49.3)
HGB BLD-MCNC: 11.2 G/DL (ref 12–17)
LYMPHOCYTES # BLD AUTO: 0.43 THOUSANDS/ΜL (ref 0.6–4.47)
LYMPHOCYTES NFR BLD AUTO: 14 % (ref 14–44)
MCH RBC QN AUTO: 35.9 PG (ref 26.8–34.3)
MCHC RBC AUTO-ENTMCNC: 32.2 G/DL (ref 31.4–37.4)
MCV RBC AUTO: 112 FL (ref 82–98)
MONOCYTES # BLD AUTO: 0.11 THOUSAND/ΜL (ref 0.17–1.22)
MONOCYTES NFR BLD AUTO: 4 % (ref 4–12)
NEUTROPHILS # BLD AUTO: 2.59 THOUSANDS/ΜL (ref 1.85–7.62)
NEUTS SEG NFR BLD AUTO: 82 % (ref 43–75)
NRBC BLD AUTO-RTO: 0 /100 WBCS
PLATELET # BLD AUTO: 74 THOUSANDS/UL (ref 149–390)
PMV BLD AUTO: 10.6 FL (ref 8.9–12.7)
POTASSIUM SERPL-SCNC: 4.2 MMOL/L (ref 3.5–5.3)
PROT SERPL-MCNC: 5.6 G/DL (ref 6.4–8.2)
RBC # BLD AUTO: 3.12 MILLION/UL (ref 3.88–5.62)
RETICS # AUTO: ABNORMAL 10*3/UL (ref 14356–105094)
RETICS # CALC: 4.07 % (ref 0.37–1.87)
SODIUM SERPL-SCNC: 137 MMOL/L (ref 136–145)
WBC # BLD AUTO: 3.14 THOUSAND/UL (ref 4.31–10.16)

## 2017-08-21 PROCEDURE — 85025 COMPLETE CBC W/AUTO DIFF WBC: CPT | Performed by: INTERNAL MEDICINE

## 2017-08-21 PROCEDURE — 85045 AUTOMATED RETICULOCYTE COUNT: CPT | Performed by: INTERNAL MEDICINE

## 2017-08-21 PROCEDURE — 82248 BILIRUBIN DIRECT: CPT | Performed by: INTERNAL MEDICINE

## 2017-08-21 PROCEDURE — 80053 COMPREHEN METABOLIC PANEL: CPT | Performed by: INTERNAL MEDICINE

## 2017-08-21 RX ORDER — ACETAMINOPHEN 325 MG/1
650 TABLET ORAL ONCE
Status: COMPLETED | OUTPATIENT
Start: 2017-08-22 | End: 2017-08-22

## 2017-08-21 RX ORDER — SODIUM CHLORIDE 9 MG/ML
20 INJECTION, SOLUTION INTRAVENOUS CONTINUOUS
Status: DISCONTINUED | OUTPATIENT
Start: 2017-08-22 | End: 2017-08-25 | Stop reason: HOSPADM

## 2017-08-21 NOTE — PLAN OF CARE
Problem: Potential for Falls  Goal: Patient will remain free of falls  INTERVENTIONS:  - Assess patient frequently for physical needs  -  Identify cognitive and physical deficits and behaviors that affect risk of falls    -  [place] fall precautions as indicated by assessment   - Educate patient/family on patient safety including physical limitations  - Instruct patient to call for assistance with activity based on assessment  - Modify environment to reduce risk of injury  - Consider OT/PT consult to assist with strengthening/mobility   Outcome: Progressing

## 2017-08-22 ENCOUNTER — HOSPITAL ENCOUNTER (OUTPATIENT)
Dept: INFUSION CENTER | Facility: CLINIC | Age: 62
Discharge: HOME | End: 2017-08-22
Payer: MEDICARE

## 2017-08-22 VITALS
HEART RATE: 80 BPM | WEIGHT: 210.76 LBS | SYSTOLIC BLOOD PRESSURE: 140 MMHG | DIASTOLIC BLOOD PRESSURE: 74 MMHG | HEIGHT: 73 IN | RESPIRATION RATE: 18 BRPM | TEMPERATURE: 97.9 F | BODY MASS INDEX: 27.93 KG/M2

## 2017-08-22 PROCEDURE — 96367 TX/PROPH/DG ADDL SEQ IV INF: CPT

## 2017-08-22 PROCEDURE — 96413 CHEMO IV INFUSION 1 HR: CPT

## 2017-08-22 PROCEDURE — 96415 CHEMO IV INFUSION ADDL HR: CPT

## 2017-08-22 RX ADMIN — SODIUM CHLORIDE 20 ML/HR: 0.9 INJECTION, SOLUTION INTRAVENOUS at 08:35

## 2017-08-22 RX ADMIN — ACETAMINOPHEN 650 MG: 325 TABLET, FILM COATED ORAL at 08:38

## 2017-08-22 RX ADMIN — OBINUTUZUMAB 1000 MG: 1000 INJECTION, SOLUTION, CONCENTRATE INTRAVENOUS at 09:46

## 2017-08-22 RX ADMIN — DIPHENHYDRAMINE HYDROCHLORIDE 25 MG: 50 INJECTION, SOLUTION INTRAMUSCULAR; INTRAVENOUS at 08:39

## 2017-08-22 RX ADMIN — DEXAMETHASONE SODIUM PHOSPHATE 20 MG: 10 INJECTION, SOLUTION INTRAMUSCULAR; INTRAVENOUS at 09:06

## 2017-08-22 NOTE — PLAN OF CARE
Problem: Potential for Falls  Goal: Patient will remain free of falls  INTERVENTIONS:  - Assess patient frequently for physical needs  -  Identify cognitive and physical deficits and behaviors that affect risk of falls    -  [place] fall precautions as indicated by assessment   - Educate patient/family on patient safety including physical limitations  - Instruct patient to call for assistance with activity based on assessment  - Modify environment to reduce risk of injury  - Consider OT/PT consult to assist with strengthening/mobility   Outcome: Progressing

## 2017-08-22 NOTE — PROGRESS NOTES
Patient tolerated infusion of Perfecto Boas today without complications; patient provided with AVS and aware of upcoming appointments

## 2017-08-25 ENCOUNTER — TRANSCRIBE ORDERS (OUTPATIENT)
Dept: ADMINISTRATIVE | Facility: HOSPITAL | Age: 62
End: 2017-08-25

## 2017-08-25 ENCOUNTER — HOSPITAL ENCOUNTER (OUTPATIENT)
Dept: RADIOLOGY | Facility: HOSPITAL | Age: 62
Discharge: HOME | End: 2017-08-25
Attending: INTERNAL MEDICINE
Payer: MEDICARE

## 2017-08-25 DIAGNOSIS — J98.4 CHRONIC PULMONARY DISEASE: ICD-10-CM

## 2017-08-25 DIAGNOSIS — J98.4 CHRONIC PULMONARY DISEASE: Primary | ICD-10-CM

## 2017-08-25 PROCEDURE — 71020 HB CHEST X-RAY 2VW FRONTAL&LATL: CPT

## 2017-08-28 ENCOUNTER — HOSPITAL ENCOUNTER (OUTPATIENT)
Dept: INFUSION CENTER | Facility: CLINIC | Age: 62
Discharge: HOME | End: 2017-08-28
Payer: MEDICARE

## 2017-08-28 LAB
ALBUMIN SERPL BCP-MCNC: 3.6 G/DL (ref 3.2–5)
ALP SERPL-CCNC: 36 U/L (ref 46–116)
ALT SERPL W P-5'-P-CCNC: 19 U/L (ref 12–78)
ANION GAP SERPL CALCULATED.3IONS-SCNC: 9 MMOL/L (ref 4–13)
AST SERPL W P-5'-P-CCNC: 21 U/L (ref 5–45)
BASOPHILS # BLD AUTO: 0 THOUSAND/UL (ref 0–0.1)
BASOPHILS NFR MAR MANUAL: 0 % (ref 0–1)
BILIRUB DIRECT SERPL-MCNC: 0.34 MG/DL (ref 0–0.2)
BILIRUB SERPL-MCNC: 1.5 MG/DL (ref 0.2–1)
BUN SERPL-MCNC: 15 MG/DL (ref 5–25)
CALCIUM SERPL-MCNC: 9.3 MG/DL (ref 8.3–10.1)
CHLORIDE SERPL-SCNC: 107 MMOL/L (ref 98–108)
CO2 SERPL-SCNC: 28 MMOL/L (ref 21–32)
CREAT SERPL-MCNC: 1 MG/DL (ref 0.6–1.3)
DACRYOCYTES BLD QL SMEAR: PRESENT
EOSINOPHIL # BLD AUTO: 0.03 THOUSAND/UL (ref 0–0.61)
EOSINOPHIL NFR BLD MANUAL: 1 % (ref 0–6)
ERYTHROCYTE [DISTWIDTH] IN BLOOD BY AUTOMATED COUNT: 13.4 % (ref 11.6–15.1)
GFR SERPL CREATININE-BSD FRML MDRD: 80 ML/MIN/1.73SQ M
GLUCOSE SERPL-MCNC: 174 MG/DL (ref 65–140)
HCT VFR BLD AUTO: 34.4 % (ref 36.5–49.3)
HGB BLD-MCNC: 11.1 G/DL (ref 12–17)
LYMPHOCYTES # BLD AUTO: 0.9 THOUSAND/UL (ref 0.6–4.47)
LYMPHOCYTES # BLD AUTO: 29 % (ref 14–44)
MACROCYTES BLD QL AUTO: PRESENT
MCH RBC QN AUTO: 33.2 PG (ref 26.8–34.3)
MCHC RBC AUTO-ENTMCNC: 32.2 G/DL (ref 31.4–37.4)
MCV RBC AUTO: 103 FL (ref 82–98)
MONOCYTES # BLD AUTO: 0.16 THOUSAND/UL (ref 0–1.22)
MONOCYTES NFR BLD AUTO: 5 % (ref 4–12)
NEUTS BAND NFR BLD MANUAL: 2 % (ref 0–8)
NEUTS SEG # BLD: 2.02 THOUSAND/UL (ref 1.81–6.82)
NEUTS SEG NFR BLD AUTO: 63 % (ref 43–75)
OVALOCYTES BLD QL SMEAR: PRESENT
PLATELET # BLD AUTO: 87 THOUSANDS/UL (ref 149–390)
PLATELET BLD QL SMEAR: ABNORMAL
PMV BLD AUTO: 7.2 FL (ref 8.9–12.7)
POTASSIUM SERPL-SCNC: 3.9 MMOL/L (ref 3.5–5.3)
PROT SERPL-MCNC: 5.7 G/DL (ref 6.4–8.2)
RBC # BLD AUTO: 3.34 MILLION/UL (ref 3.88–5.62)
RETICS # AUTO: ABNORMAL 10*3/UL (ref 14356–105094)
RETICS # CALC: 2.1 % (ref 0.37–1.87)
SODIUM SERPL-SCNC: 144 MMOL/L (ref 136–145)
TOTAL CELLS COUNTED SPEC: 100
WBC # BLD AUTO: 3.1 THOUSAND/UL (ref 4.31–10.16)
WBC NRBC COR # BLD: 3.1 THOUSAND/UL (ref 4.31–10.16)

## 2017-08-28 PROCEDURE — 82248 BILIRUBIN DIRECT: CPT | Performed by: PHYSICIAN ASSISTANT

## 2017-08-28 PROCEDURE — 85007 BL SMEAR W/DIFF WBC COUNT: CPT | Performed by: PHYSICIAN ASSISTANT

## 2017-08-28 PROCEDURE — 85045 AUTOMATED RETICULOCYTE COUNT: CPT | Performed by: PHYSICIAN ASSISTANT

## 2017-08-28 PROCEDURE — 85027 COMPLETE CBC AUTOMATED: CPT | Performed by: PHYSICIAN ASSISTANT

## 2017-08-28 PROCEDURE — 80053 COMPREHEN METABOLIC PANEL: CPT | Performed by: PHYSICIAN ASSISTANT

## 2017-08-28 NOTE — PROGRESS NOTES
Central labs drawn via port  Catheter maintenance performed per protocol without complications  Pt aware of next appointment  AVS provided

## 2017-08-31 ENCOUNTER — GENERIC CONVERSION - ENCOUNTER (OUTPATIENT)
Dept: OTHER | Facility: OTHER | Age: 62
End: 2017-08-31

## 2017-09-01 RX ORDER — SODIUM CHLORIDE 9 MG/ML
20 INJECTION, SOLUTION INTRAVENOUS CONTINUOUS
Status: DISCONTINUED | OUTPATIENT
Start: 2017-09-05 | End: 2017-09-08 | Stop reason: HOSPADM

## 2017-09-05 ENCOUNTER — OFFICE VISIT (OUTPATIENT)
Dept: URGENT CARE | Facility: MEDICAL CENTER | Age: 62
End: 2017-09-05
Payer: MEDICARE

## 2017-09-05 ENCOUNTER — HOSPITAL ENCOUNTER (OUTPATIENT)
Dept: INFUSION CENTER | Facility: CLINIC | Age: 62
Discharge: HOME | End: 2017-09-05
Payer: MEDICARE

## 2017-09-05 LAB
ALBUMIN SERPL BCP-MCNC: 3.6 G/DL (ref 3.2–5)
ALP SERPL-CCNC: 65 U/L (ref 46–116)
ALT SERPL W P-5'-P-CCNC: 15 U/L (ref 12–78)
ANION GAP SERPL CALCULATED.3IONS-SCNC: 1 MMOL/L (ref 4–13)
ANISOCYTOSIS BLD QL SMEAR: PRESENT
AST SERPL W P-5'-P-CCNC: 16 U/L (ref 5–45)
BASOPHILS # BLD AUTO: 0 THOUSAND/UL (ref 0–0.1)
BASOPHILS NFR MAR MANUAL: 0 % (ref 0–1)
BILIRUB DIRECT SERPL-MCNC: 0.28 MG/DL (ref 0–0.2)
BILIRUB SERPL-MCNC: 1.2 MG/DL (ref 0.2–1)
BUN SERPL-MCNC: 18 MG/DL (ref 5–25)
CALCIUM SERPL-MCNC: 9.2 MG/DL (ref 8.3–10.1)
CHLORIDE SERPL-SCNC: 105 MMOL/L (ref 98–108)
CO2 SERPL-SCNC: 30 MMOL/L (ref 21–32)
CREAT SERPL-MCNC: 1 MG/DL (ref 0.6–1.3)
EOSINOPHIL # BLD AUTO: 0 THOUSAND/UL (ref 0–0.61)
EOSINOPHIL NFR BLD MANUAL: 0 % (ref 0–6)
ERYTHROCYTE [DISTWIDTH] IN BLOOD BY AUTOMATED COUNT: 13.4 % (ref 11.6–15.1)
GFR SERPL CREATININE-BSD FRML MDRD: 80 ML/MIN/1.73SQ M
GLUCOSE SERPL-MCNC: 159 MG/DL (ref 65–140)
HCT VFR BLD AUTO: 35.5 % (ref 36.5–49.3)
HGB BLD-MCNC: 11.1 G/DL (ref 12–17)
LYMPHOCYTES # BLD AUTO: 1.09 THOUSAND/UL (ref 0.6–4.47)
LYMPHOCYTES # BLD AUTO: 34 % (ref 14–44)
MCH RBC QN AUTO: 30.9 PG (ref 26.8–34.3)
MCHC RBC AUTO-ENTMCNC: 31.2 G/DL (ref 31.4–37.4)
MCV RBC AUTO: 99 FL (ref 82–98)
MONOCYTES # BLD AUTO: 0.06 THOUSAND/UL (ref 0–1.22)
MONOCYTES NFR BLD AUTO: 2 % (ref 4–12)
NEUTS BAND NFR BLD MANUAL: 4 % (ref 0–8)
NEUTS SEG # BLD: 2.05 THOUSAND/UL (ref 1.81–6.82)
NEUTS SEG NFR BLD AUTO: 60 % (ref 43–75)
PLATELET # BLD AUTO: 110 THOUSANDS/UL (ref 149–390)
PLATELET BLD QL SMEAR: ABNORMAL
PMV BLD AUTO: 7.1 FL (ref 8.9–12.7)
POTASSIUM SERPL-SCNC: 3.6 MMOL/L (ref 3.5–5.3)
PROT SERPL-MCNC: 5.5 G/DL (ref 6.4–8.2)
RBC # BLD AUTO: 3.58 MILLION/UL (ref 3.88–5.62)
RETICS # AUTO: ABNORMAL 10*3/UL (ref 14356–105094)
RETICS # CALC: 3.68 % (ref 0.37–1.87)
SODIUM SERPL-SCNC: 136 MMOL/L (ref 136–145)
TOTAL CELLS COUNTED SPEC: 100
WBC # BLD AUTO: 3.2 THOUSAND/UL (ref 4.31–10.16)
WBC NRBC COR # BLD: 3.2 THOUSAND/UL (ref 4.31–10.16)

## 2017-09-05 PROCEDURE — 85007 BL SMEAR W/DIFF WBC COUNT: CPT | Performed by: INTERNAL MEDICINE

## 2017-09-05 PROCEDURE — G0463 HOSPITAL OUTPT CLINIC VISIT: HCPCS

## 2017-09-05 PROCEDURE — 82248 BILIRUBIN DIRECT: CPT | Performed by: INTERNAL MEDICINE

## 2017-09-05 PROCEDURE — 85045 AUTOMATED RETICULOCYTE COUNT: CPT | Performed by: INTERNAL MEDICINE

## 2017-09-05 PROCEDURE — 80053 COMPREHEN METABOLIC PANEL: CPT | Performed by: INTERNAL MEDICINE

## 2017-09-05 PROCEDURE — 99213 OFFICE O/P EST LOW 20 MIN: CPT

## 2017-09-05 PROCEDURE — 85027 COMPLETE CBC AUTOMATED: CPT | Performed by: INTERNAL MEDICINE

## 2017-09-05 NOTE — PLAN OF CARE
Problem: PAIN - ADULT  Goal: Verbalizes/displays adequate comfort level or baseline comfort level  Interventions:  - Encourage patient to monitor pain and request assistance  - Assess pain using appropriate pain scale  - Administer analgesics based on type and severity of pain and evaluate response  - Implement non-pharmacological measures as appropriate and evaluate response  - Consider cultural and social influences on pain and pain management  - Notify physician/advanced practitioner if interventions unsuccessful or patient reports new pain  Outcome: Progressing      Problem: INFECTION - ADULT  Goal: Absence or prevention of progression during hospitalization  INTERVENTIONS:  - Assess and monitor for signs and symptoms of infection  - Monitor lab/diagnostic results  - Monitor all insertion sites, i e  indwelling lines, tubes, and drains  - Monitor endotracheal (as able) and nasal secretions for changes in amount and color  - [place] appropriate cooling/warming therapies per order  - Administer medications as ordered  - Instruct and encourage patient and family to use good hand hygiene technique  - Identify and instruct in appropriate isolation precautions for identified infection/condition  Outcome: Progressing    Goal: Absence of fever/infection during neutropenic period  INTERVENTIONS:  - Monitor WBC  - Implement neutropenic guidelines  Outcome: Progressing      Problem: Knowledge Deficit  Goal: Patient/family/caregiver demonstrates understanding of disease process, treatment plan, medications, and discharge instructions  Complete learning assessment and assess knowledge base    Interventions:  - Provide teaching at level of understanding  - Provide teaching via preferred learning methods  Outcome: Progressing

## 2017-09-05 NOTE — PROGRESS NOTES
Pt  Verbalizes large sore on Rt  Leg at this time and going to Urgent care following lab appointment today  RN instructed patient to call Dr Candance Ly today for assessment  Pt  States he has been afebrile as he takes temperature at least daily with chemotherapy regime  Weekly labs obtained as ordered  Monthly labs due next week  Copy of last week labs given as requested  Future appointments reviewed    Declined AVS

## 2017-09-07 ENCOUNTER — GENERIC CONVERSION - ENCOUNTER (OUTPATIENT)
Dept: OTHER | Facility: OTHER | Age: 62
End: 2017-09-07

## 2017-09-11 ENCOUNTER — GENERIC CONVERSION - ENCOUNTER (OUTPATIENT)
Dept: OTHER | Facility: OTHER | Age: 62
End: 2017-09-11

## 2017-09-11 ENCOUNTER — ALLSCRIPTS OFFICE VISIT (OUTPATIENT)
Dept: OTHER | Facility: OTHER | Age: 62
End: 2017-09-11

## 2017-09-11 ENCOUNTER — HOSPITAL ENCOUNTER (EMERGENCY)
Facility: HOSPITAL | Age: 62
Discharge: HOME | End: 2017-09-11
Attending: EMERGENCY MEDICINE | Admitting: EMERGENCY MEDICINE
Payer: MEDICARE

## 2017-09-11 ENCOUNTER — HOSPITAL ENCOUNTER (OUTPATIENT)
Dept: INFUSION CENTER | Facility: CLINIC | Age: 62
Discharge: HOME | End: 2017-09-11
Payer: MEDICARE

## 2017-09-11 VITALS
DIASTOLIC BLOOD PRESSURE: 93 MMHG | SYSTOLIC BLOOD PRESSURE: 150 MMHG | HEART RATE: 74 BPM | TEMPERATURE: 98 F | RESPIRATION RATE: 18 BRPM

## 2017-09-11 VITALS
HEART RATE: 77 BPM | SYSTOLIC BLOOD PRESSURE: 144 MMHG | BODY MASS INDEX: 28.07 KG/M2 | DIASTOLIC BLOOD PRESSURE: 79 MMHG | OXYGEN SATURATION: 97 % | RESPIRATION RATE: 12 BRPM | WEIGHT: 210 LBS | TEMPERATURE: 97.7 F

## 2017-09-11 DIAGNOSIS — L03.119 CELLULITIS AND ABSCESS OF LEG: Primary | ICD-10-CM

## 2017-09-11 DIAGNOSIS — L02.419 CELLULITIS AND ABSCESS OF LEG: Primary | ICD-10-CM

## 2017-09-11 LAB
ALBUMIN SERPL BCP-MCNC: 3.6 G/DL (ref 3.2–5)
ALP SERPL-CCNC: 45 U/L (ref 46–116)
ALT SERPL W P-5'-P-CCNC: 21 U/L (ref 12–78)
ANION GAP SERPL CALCULATED.3IONS-SCNC: 3 MMOL/L (ref 4–13)
ANISOCYTOSIS BLD QL SMEAR: PRESENT
AST SERPL W P-5'-P-CCNC: 20 U/L (ref 5–45)
BASOPHILS # BLD AUTO: 0 THOUSAND/UL (ref 0–0.1)
BASOPHILS NFR MAR MANUAL: 0 % (ref 0–1)
BILIRUB DIRECT SERPL-MCNC: 0.33 MG/DL (ref 0–0.2)
BILIRUB SERPL-MCNC: 1.3 MG/DL (ref 0.2–1)
BUN SERPL-MCNC: 16 MG/DL (ref 5–25)
CALCIUM SERPL-MCNC: 8.9 MG/DL (ref 8.3–10.1)
CHLORIDE SERPL-SCNC: 104 MMOL/L (ref 98–108)
CO2 SERPL-SCNC: 28 MMOL/L (ref 21–32)
CREAT SERPL-MCNC: 0.9 MG/DL (ref 0.6–1.3)
EOSINOPHIL # BLD AUTO: 0.06 THOUSAND/UL (ref 0–0.61)
EOSINOPHIL NFR BLD MANUAL: 2 % (ref 0–6)
ERYTHROCYTE [DISTWIDTH] IN BLOOD BY AUTOMATED COUNT: 13.2 % (ref 11.6–15.1)
GFR SERPL CREATININE-BSD FRML MDRD: 91 ML/MIN/1.73SQ M
GLUCOSE SERPL-MCNC: 210 MG/DL (ref 65–140)
HCT VFR BLD AUTO: 35.6 % (ref 36.5–49.3)
HGB BLD-MCNC: 11.8 G/DL (ref 12–17)
IGG SERPL-MCNC: 194 MG/DL (ref 700–1600)
LDH SERPL-CCNC: 194 U/L (ref 81–234)
LYMPHOCYTES # BLD AUTO: 1.33 THOUSAND/UL (ref 0.6–4.47)
LYMPHOCYTES # BLD AUTO: 46 % (ref 14–44)
MCH RBC QN AUTO: 31.7 PG (ref 26.8–34.3)
MCHC RBC AUTO-ENTMCNC: 33 G/DL (ref 31.4–37.4)
MCV RBC AUTO: 96 FL (ref 82–98)
MONOCYTES # BLD AUTO: 0.32 THOUSAND/UL (ref 0–1.22)
MONOCYTES NFR BLD AUTO: 11 % (ref 4–12)
NEUTS BAND NFR BLD MANUAL: 1 % (ref 0–8)
NEUTS SEG # BLD: 1.19 THOUSAND/UL (ref 1.81–6.82)
NEUTS SEG NFR BLD AUTO: 40 % (ref 43–75)
PLATELET # BLD AUTO: 88 THOUSANDS/UL (ref 149–390)
PLATELET BLD QL SMEAR: ABNORMAL
PMV BLD AUTO: 7.9 FL (ref 8.9–12.7)
POTASSIUM SERPL-SCNC: 3.8 MMOL/L (ref 3.5–5.3)
PROT SERPL-MCNC: 5.4 G/DL (ref 6.4–8.2)
RBC # BLD AUTO: 3.71 MILLION/UL (ref 3.88–5.62)
RETICS # AUTO: ABNORMAL 10*3/UL (ref 14356–105094)
RETICS # CALC: 3.09 % (ref 0.37–1.87)
SODIUM SERPL-SCNC: 135 MMOL/L (ref 136–145)
TOTAL CELLS COUNTED SPEC: 100
URATE SERPL-MCNC: 3.6 MG/DL (ref 4.2–8)
WBC # BLD AUTO: 2.9 THOUSAND/UL (ref 4.31–10.16)
WBC NRBC COR # BLD: 2.9 THOUSAND/UL (ref 4.31–10.16)

## 2017-09-11 PROCEDURE — 80053 COMPREHEN METABOLIC PANEL: CPT | Performed by: PHYSICIAN ASSISTANT

## 2017-09-11 PROCEDURE — 82232 ASSAY OF BETA-2 PROTEIN: CPT | Performed by: PHYSICIAN ASSISTANT

## 2017-09-11 PROCEDURE — 99282 EMERGENCY DEPT VISIT SF MDM: CPT

## 2017-09-11 PROCEDURE — 85027 COMPLETE CBC AUTOMATED: CPT | Performed by: PHYSICIAN ASSISTANT

## 2017-09-11 PROCEDURE — 85045 AUTOMATED RETICULOCYTE COUNT: CPT | Performed by: PHYSICIAN ASSISTANT

## 2017-09-11 PROCEDURE — 83615 LACTATE (LD) (LDH) ENZYME: CPT | Performed by: PHYSICIAN ASSISTANT

## 2017-09-11 PROCEDURE — 82784 ASSAY IGA/IGD/IGG/IGM EACH: CPT | Performed by: PHYSICIAN ASSISTANT

## 2017-09-11 PROCEDURE — 84550 ASSAY OF BLOOD/URIC ACID: CPT | Performed by: PHYSICIAN ASSISTANT

## 2017-09-11 PROCEDURE — 85007 BL SMEAR W/DIFF WBC COUNT: CPT | Performed by: PHYSICIAN ASSISTANT

## 2017-09-11 PROCEDURE — 82248 BILIRUBIN DIRECT: CPT | Performed by: PHYSICIAN ASSISTANT

## 2017-09-11 RX ORDER — DOXYCYCLINE HYCLATE 100 MG/1
100 CAPSULE ORAL 2 TIMES DAILY
Qty: 6 CAPSULE | Refills: 0 | Status: SHIPPED | OUTPATIENT
Start: 2017-09-11 | End: 2017-09-14

## 2017-09-11 NOTE — PROGRESS NOTES
Spoke with Anson Hutchins RN for Dr Nancie Landaverde  Informed her of wound on Right upper thigh  Informed her area warm to the touch and lump noticed under skin upon palpation  Informed her pt not febrile, denies chills, but he has noticed two other areas on his right thumbnail and the back of his scalp  Pt was seen at [facility] urgent care center and was given po antibiotics but pt is unaware of antibiotic name at this time  Per Cynthia Hernandez pt should return to urgent care to be seen and evaluated by provider there  Pt verbalizes understanding of these instructions

## 2017-09-11 NOTE — PROGRESS NOTES
Pt tolerated central lab draw  Pt aware to call MD office for fever over 100 4, or chills   Pt denies avs at this time

## 2017-09-12 LAB — B2 MICROGLOB SERPL-MCNC: 2.8 MG/L (ref 0.6–2.4)

## 2017-09-18 ENCOUNTER — HOSPITAL ENCOUNTER (INPATIENT)
Facility: HOSPITAL | Age: 62
LOS: 2 days | Discharge: HOME | DRG: 638 | End: 2017-09-20
Attending: EMERGENCY MEDICINE | Admitting: HOSPITALIST
Payer: MEDICARE

## 2017-09-18 ENCOUNTER — APPOINTMENT (INPATIENT)
Dept: RADIOLOGY | Facility: HOSPITAL | Age: 62
DRG: 638 | End: 2017-09-18
Payer: MEDICARE

## 2017-09-18 ENCOUNTER — GENERIC CONVERSION - ENCOUNTER (OUTPATIENT)
Dept: OTHER | Facility: OTHER | Age: 62
End: 2017-09-18

## 2017-09-18 ENCOUNTER — HOSPITAL ENCOUNTER (OUTPATIENT)
Dept: INFUSION CENTER | Facility: CLINIC | Age: 62
Discharge: HOME | DRG: 638 | End: 2017-09-18
Payer: MEDICARE

## 2017-09-18 ENCOUNTER — APPOINTMENT (INPATIENT)
Dept: CT IMAGING | Facility: HOSPITAL | Age: 62
DRG: 638 | End: 2017-09-18
Payer: MEDICARE

## 2017-09-18 DIAGNOSIS — C91.10 CHRONIC LYMPHOCYTIC LEUKEMIA (HCC): ICD-10-CM

## 2017-09-18 DIAGNOSIS — T45.1X5A LEUKOPENIA DUE TO ANTINEOPLASTIC CHEMOTHERAPY (HCC): ICD-10-CM

## 2017-09-18 DIAGNOSIS — C91.10 CLL (CHRONIC LYMPHOCYTIC LEUKEMIA) (HCC): ICD-10-CM

## 2017-09-18 DIAGNOSIS — R73.9 HYPERGLYCEMIA: Primary | ICD-10-CM

## 2017-09-18 DIAGNOSIS — R73.9 ACUTE HYPERGLYCEMIA: ICD-10-CM

## 2017-09-18 DIAGNOSIS — D70.1 LEUKOPENIA DUE TO ANTINEOPLASTIC CHEMOTHERAPY (HCC): ICD-10-CM

## 2017-09-18 DIAGNOSIS — Z86.718 H/O BLOOD CLOTS: ICD-10-CM

## 2017-09-18 DIAGNOSIS — K21.00 GASTROESOPHAGEAL REFLUX DISEASE WITH ESOPHAGITIS: ICD-10-CM

## 2017-09-18 DIAGNOSIS — D63.8 ANEMIA, CHRONIC DISEASE: ICD-10-CM

## 2017-09-18 DIAGNOSIS — N18.30 CKD (CHRONIC KIDNEY DISEASE) STAGE 3, GFR 30-59 ML/MIN (HCC): ICD-10-CM

## 2017-09-18 DIAGNOSIS — D75.82 HIT (HEPARIN-INDUCED THROMBOCYTOPENIA) (HCC): ICD-10-CM

## 2017-09-18 DIAGNOSIS — Z86.73 HISTORY OF TIA (TRANSIENT ISCHEMIC ATTACK): ICD-10-CM

## 2017-09-18 DIAGNOSIS — B37.81 CANDIDA ESOPHAGITIS (HCC): ICD-10-CM

## 2017-09-18 LAB
ALBUMIN SERPL BCP-MCNC: 3.8 G/DL (ref 3.5–5)
ALBUMIN SERPL BCP-MCNC: 4 G/DL (ref 3.2–5)
ALP SERPL-CCNC: 63 U/L (ref 46–116)
ALP SERPL-CCNC: 66 U/L (ref 46–116)
ALT SERPL W P-5'-P-CCNC: 17 U/L (ref 12–78)
ALT SERPL W P-5'-P-CCNC: 28 U/L (ref 12–78)
ANION GAP SERPL CALCULATED.3IONS-SCNC: 4 MMOL/L (ref 4–13)
ANION GAP SERPL CALCULATED.3IONS-SCNC: 5 MMOL/L (ref 4–13)
ANISOCYTOSIS BLD QL SMEAR: PRESENT
ANISOCYTOSIS BLD QL SMEAR: PRESENT
AST SERPL W P-5'-P-CCNC: 17 U/L (ref 5–45)
AST SERPL W P-5'-P-CCNC: 35 U/L (ref 5–45)
BACTERIA UR QL AUTO: ABNORMAL /HPF
BASOPHILS # BLD AUTO: 0 THOUSAND/UL (ref 0–0.1)
BASOPHILS # BLD MANUAL: 0 THOUSAND/UL (ref 0–0.1)
BASOPHILS NFR MAR MANUAL: 0 % (ref 0–1)
BASOPHILS NFR MAR MANUAL: 0 % (ref 0–1)
BILIRUB DIRECT SERPL-MCNC: 0.26 MG/DL (ref 0–0.2)
BILIRUB SERPL-MCNC: 0.99 MG/DL (ref 0.2–1)
BILIRUB SERPL-MCNC: 1.1 MG/DL (ref 0.2–1)
BILIRUB UR QL STRIP: NEGATIVE
BUN SERPL-MCNC: 18 MG/DL (ref 5–25)
BUN SERPL-MCNC: 20 MG/DL (ref 5–25)
CALCIUM SERPL-MCNC: 8.9 MG/DL (ref 8.3–10.1)
CALCIUM SERPL-MCNC: 9.4 MG/DL (ref 8.3–10.1)
CHLORIDE SERPL-SCNC: 100 MMOL/L (ref 100–108)
CHLORIDE SERPL-SCNC: 100 MMOL/L (ref 98–108)
CLARITY UR: CLEAR
CO2 SERPL-SCNC: 27 MMOL/L (ref 21–32)
CO2 SERPL-SCNC: 30 MMOL/L (ref 21–32)
COLOR UR: ABNORMAL
CREAT SERPL-MCNC: 1 MG/DL (ref 0.6–1.3)
CREAT SERPL-MCNC: 1.02 MG/DL (ref 0.6–1.3)
EOSINOPHIL # BLD AUTO: 0 THOUSAND/UL (ref 0–0.61)
EOSINOPHIL # BLD MANUAL: 0 THOUSAND/UL (ref 0–0.4)
EOSINOPHIL NFR BLD MANUAL: 0 % (ref 0–6)
EOSINOPHIL NFR BLD MANUAL: 0 % (ref 0–6)
ERYTHROCYTE [DISTWIDTH] IN BLOOD BY AUTOMATED COUNT: 12.7 % (ref 11.6–15.1)
ERYTHROCYTE [DISTWIDTH] IN BLOOD BY AUTOMATED COUNT: 12.8 % (ref 11.6–15.1)
GFR SERPL CREATININE-BSD FRML MDRD: 78 ML/MIN/1.73SQ M
GFR SERPL CREATININE-BSD FRML MDRD: 80 ML/MIN/1.73SQ M
GLUCOSE SERPL-MCNC: 217 MG/DL (ref 65–140)
GLUCOSE SERPL-MCNC: 254 MG/DL (ref 65–140)
GLUCOSE SERPL-MCNC: 363 MG/DL (ref 65–140)
GLUCOSE SERPL-MCNC: 474 MG/DL (ref 65–140)
GLUCOSE SERPL-MCNC: 552 MG/DL (ref 65–140)
GLUCOSE SERPL-MCNC: 623 MG/DL (ref 65–140)
GLUCOSE UR STRIP-MCNC: ABNORMAL MG/DL
HCT VFR BLD AUTO: 38 % (ref 36.5–49.3)
HCT VFR BLD AUTO: 38.4 % (ref 36.5–49.3)
HGB BLD-MCNC: 12.7 G/DL (ref 12–17)
HGB BLD-MCNC: 13 G/DL (ref 12–17)
HGB UR QL STRIP.AUTO: ABNORMAL
KETONES UR STRIP-MCNC: NEGATIVE MG/DL
LEUKOCYTE ESTERASE UR QL STRIP: ABNORMAL
LYMPHOCYTES # BLD AUTO: 0.73 THOUSAND/UL (ref 0.6–4.47)
LYMPHOCYTES # BLD AUTO: 1.51 THOUSAND/UL (ref 0.6–4.47)
LYMPHOCYTES # BLD AUTO: 49 % (ref 14–44)
LYMPHOCYTES # BLD AUTO: 52 % (ref 14–44)
MAGNESIUM SERPL-MCNC: 1.8 MG/DL (ref 1.6–2.6)
MCH RBC QN AUTO: 31.2 PG (ref 26.8–34.3)
MCH RBC QN AUTO: 31.9 PG (ref 26.8–34.3)
MCHC RBC AUTO-ENTMCNC: 33.3 G/DL (ref 31.4–37.4)
MCHC RBC AUTO-ENTMCNC: 33.9 G/DL (ref 31.4–37.4)
MCV RBC AUTO: 94 FL (ref 82–98)
MCV RBC AUTO: 94 FL (ref 82–98)
MONOCYTES # BLD AUTO: 0.09 THOUSAND/UL (ref 0–1.22)
MONOCYTES # BLD AUTO: 0.1 THOUSAND/UL (ref 0–1.22)
MONOCYTES NFR BLD AUTO: 3 % (ref 4–12)
MONOCYTES NFR BLD: 7 % (ref 4–12)
NEUTROPHILS # BLD MANUAL: 0.66 THOUSAND/UL (ref 1.85–7.62)
NEUTS BAND NFR BLD MANUAL: 2 % (ref 0–8)
NEUTS SEG # BLD: 1.31 THOUSAND/UL (ref 1.81–6.82)
NEUTS SEG NFR BLD AUTO: 43 % (ref 43–75)
NEUTS SEG NFR BLD AUTO: 44 % (ref 43–75)
NITRITE UR QL STRIP: NEGATIVE
NON-SQ EPI CELLS URNS QL MICRO: ABNORMAL /HPF
NRBC BLD AUTO-RTO: 73 /100 WBCS
PH UR STRIP.AUTO: 6 [PH] (ref 4.5–8)
PLATELET # BLD AUTO: 70 THOUSANDS/UL (ref 149–390)
PLATELET # BLD AUTO: 99 THOUSANDS/UL (ref 149–390)
PLATELET BLD QL SMEAR: ABNORMAL
PLATELET BLD QL SMEAR: ABNORMAL
PMV BLD AUTO: 12.1 FL (ref 8.9–12.7)
PMV BLD AUTO: 7.8 FL (ref 8.9–12.7)
POTASSIUM SERPL-SCNC: 3.7 MMOL/L (ref 3.5–5.3)
POTASSIUM SERPL-SCNC: 4.5 MMOL/L (ref 3.5–5.3)
PROT SERPL-MCNC: 5.7 G/DL (ref 6.4–8.2)
PROT SERPL-MCNC: 6 G/DL (ref 6.4–8.2)
PROT UR STRIP-MCNC: NEGATIVE MG/DL
RBC # BLD AUTO: 4.06 MILLION/UL (ref 3.88–5.62)
RBC # BLD AUTO: 4.08 MILLION/UL (ref 3.88–5.62)
RBC #/AREA URNS AUTO: ABNORMAL /HPF
RETICS # AUTO: ABNORMAL 10*3/UL (ref 14356–105094)
RETICS # CALC: 2.22 % (ref 0.37–1.87)
SODIUM SERPL-SCNC: 132 MMOL/L (ref 136–145)
SODIUM SERPL-SCNC: 134 MMOL/L (ref 136–145)
SP GR UR STRIP.AUTO: <=1.005 (ref 1–1.03)
TOTAL CELLS COUNTED SPEC: 100
TOTAL CELLS COUNTED SPEC: 100
UROBILINOGEN UR QL STRIP.AUTO: 0.2 E.U./DL
WBC # BLD AUTO: 1.49 THOUSAND/UL (ref 4.31–10.16)
WBC # BLD AUTO: 2.9 THOUSAND/UL (ref 4.31–10.16)
WBC #/AREA URNS AUTO: ABNORMAL /HPF
WBC NRBC COR # BLD: 2.9 THOUSAND/UL (ref 4.31–10.16)

## 2017-09-18 PROCEDURE — 85027 COMPLETE CBC AUTOMATED: CPT | Performed by: INTERNAL MEDICINE

## 2017-09-18 PROCEDURE — 71020 HB CHEST X-RAY 2VW FRONTAL&LATL: CPT

## 2017-09-18 PROCEDURE — 96360 HYDRATION IV INFUSION INIT: CPT

## 2017-09-18 PROCEDURE — 82948 REAGENT STRIP/BLOOD GLUCOSE: CPT

## 2017-09-18 PROCEDURE — 82248 BILIRUBIN DIRECT: CPT | Performed by: INTERNAL MEDICINE

## 2017-09-18 PROCEDURE — 85045 AUTOMATED RETICULOCYTE COUNT: CPT | Performed by: INTERNAL MEDICINE

## 2017-09-18 PROCEDURE — 99285 EMERGENCY DEPT VISIT HI MDM: CPT

## 2017-09-18 PROCEDURE — 85027 COMPLETE CBC AUTOMATED: CPT | Performed by: EMERGENCY MEDICINE

## 2017-09-18 PROCEDURE — 85007 BL SMEAR W/DIFF WBC COUNT: CPT | Performed by: EMERGENCY MEDICINE

## 2017-09-18 PROCEDURE — 80053 COMPREHEN METABOLIC PANEL: CPT | Performed by: EMERGENCY MEDICINE

## 2017-09-18 PROCEDURE — 36415 COLL VENOUS BLD VENIPUNCTURE: CPT | Performed by: EMERGENCY MEDICINE

## 2017-09-18 PROCEDURE — 85007 BL SMEAR W/DIFF WBC COUNT: CPT | Performed by: INTERNAL MEDICINE

## 2017-09-18 PROCEDURE — 81001 URINALYSIS AUTO W/SCOPE: CPT | Performed by: EMERGENCY MEDICINE

## 2017-09-18 PROCEDURE — 83735 ASSAY OF MAGNESIUM: CPT | Performed by: EMERGENCY MEDICINE

## 2017-09-18 PROCEDURE — 70450 CT HEAD/BRAIN W/O DYE: CPT

## 2017-09-18 PROCEDURE — 80053 COMPREHEN METABOLIC PANEL: CPT | Performed by: INTERNAL MEDICINE

## 2017-09-18 RX ORDER — ONDANSETRON 2 MG/ML
4 INJECTION INTRAMUSCULAR; INTRAVENOUS EVERY 6 HOURS PRN
Status: DISCONTINUED | OUTPATIENT
Start: 2017-09-18 | End: 2017-09-20 | Stop reason: HOSPADM

## 2017-09-18 RX ORDER — SODIUM CHLORIDE 9 MG/ML
125 INJECTION, SOLUTION INTRAVENOUS CONTINUOUS
Status: DISCONTINUED | OUTPATIENT
Start: 2017-09-18 | End: 2017-09-19

## 2017-09-18 RX ORDER — PREDNISONE 20 MG/1
40 TABLET ORAL DAILY
Status: DISCONTINUED | OUTPATIENT
Start: 2017-09-18 | End: 2017-09-18

## 2017-09-18 RX ORDER — FENOFIBRATE 48 MG/1
134 TABLET, COATED ORAL DAILY
Status: DISCONTINUED | OUTPATIENT
Start: 2017-09-18 | End: 2017-09-20 | Stop reason: HOSPADM

## 2017-09-18 RX ORDER — DOCUSATE SODIUM 100 MG/1
100 CAPSULE, LIQUID FILLED ORAL 2 TIMES DAILY PRN
Status: DISCONTINUED | OUTPATIENT
Start: 2017-09-18 | End: 2017-09-20 | Stop reason: HOSPADM

## 2017-09-18 RX ORDER — PREDNISONE 20 MG/1
20 TABLET ORAL DAILY
Status: DISCONTINUED | OUTPATIENT
Start: 2017-09-19 | End: 2017-09-20 | Stop reason: HOSPADM

## 2017-09-18 RX ORDER — LORAZEPAM 0.5 MG/1
0.5 TABLET ORAL EVERY 8 HOURS PRN
Status: DISCONTINUED | OUTPATIENT
Start: 2017-09-18 | End: 2017-09-20 | Stop reason: HOSPADM

## 2017-09-18 RX ORDER — ASPIRIN 81 MG/1
81 TABLET, CHEWABLE ORAL DAILY
Status: DISCONTINUED | OUTPATIENT
Start: 2017-09-18 | End: 2017-09-20 | Stop reason: HOSPADM

## 2017-09-18 RX ORDER — LANOLIN ALCOHOL/MO/W.PET/CERES
800 CREAM (GRAM) TOPICAL DAILY
Status: DISCONTINUED | OUTPATIENT
Start: 2017-09-18 | End: 2017-09-20 | Stop reason: HOSPADM

## 2017-09-18 RX ORDER — CALCIUM CARBONATE 200(500)MG
1000 TABLET,CHEWABLE ORAL DAILY PRN
Status: DISCONTINUED | OUTPATIENT
Start: 2017-09-18 | End: 2017-09-20 | Stop reason: HOSPADM

## 2017-09-18 RX ORDER — ACETAMINOPHEN 325 MG/1
650 TABLET ORAL EVERY 4 HOURS PRN
Status: DISCONTINUED | OUTPATIENT
Start: 2017-09-18 | End: 2017-09-20 | Stop reason: HOSPADM

## 2017-09-18 RX ORDER — GABAPENTIN 100 MG/1
100 CAPSULE ORAL 2 TIMES DAILY
Status: DISCONTINUED | OUTPATIENT
Start: 2017-09-18 | End: 2017-09-20 | Stop reason: HOSPADM

## 2017-09-18 RX ADMIN — SODIUM CHLORIDE 125 ML/HR: 0.9 INJECTION, SOLUTION INTRAVENOUS at 16:10

## 2017-09-18 RX ADMIN — TBO-FILGRASTIM 480 MCG: 300 INJECTION, SOLUTION SUBCUTANEOUS at 20:49

## 2017-09-18 RX ADMIN — SODIUM CHLORIDE 12 UNITS/HR: 9 INJECTION, SOLUTION INTRAVENOUS at 14:26

## 2017-09-18 RX ADMIN — SODIUM CHLORIDE 1000 ML: 0.9 INJECTION, SOLUTION INTRAVENOUS at 12:16

## 2017-09-18 RX ADMIN — SODIUM CHLORIDE 1000 ML: 0.9 INJECTION, SOLUTION INTRAVENOUS at 13:20

## 2017-09-18 RX ADMIN — GABAPENTIN 100 MG: 100 CAPSULE ORAL at 17:04

## 2017-09-18 NOTE — PROGRESS NOTES
Patient arrived for Central line blood work  Patient states he has been in pain for a while  Patient has cramping in his legs and hands which prevent him from sleeping  He also has general achyness and throbbing pain in his head  Patient states he has been dealing with cysts  One on his left thigh which is resolving and three on his head which come and go and he feels is the cause of his head pain  Patient states he notified Dr Latia Lunsford nurse of all his pain issues and she instructed him to see his family Dr  Patient went to ER instead and was given antibiotics for cysts but no pain relief  Patient has appointment with Dr Nancy Gamble on Wednesday before his next chemo treatment  Blood work drawn with port access  Port flushed per protocol without heparin due to patient allergy   Patient declined AVS

## 2017-09-19 ENCOUNTER — GENERIC CONVERSION - ENCOUNTER (OUTPATIENT)
Dept: OTHER | Facility: OTHER | Age: 62
End: 2017-09-19

## 2017-09-19 LAB
ANION GAP SERPL CALCULATED.3IONS-SCNC: 2 MMOL/L (ref 4–13)
BUN SERPL-MCNC: 17 MG/DL (ref 5–25)
CALCIUM SERPL-MCNC: 8.7 MG/DL (ref 8.3–10.1)
CHLORIDE SERPL-SCNC: 108 MMOL/L (ref 100–108)
CO2 SERPL-SCNC: 31 MMOL/L (ref 21–32)
CREAT SERPL-MCNC: 0.77 MG/DL (ref 0.6–1.3)
ERYTHROCYTE [DISTWIDTH] IN BLOOD BY AUTOMATED COUNT: 12.5 % (ref 11.6–15.1)
EST. AVERAGE GLUCOSE BLD GHB EST-MCNC: 163 MG/DL
GFR SERPL CREATININE-BSD FRML MDRD: 97 ML/MIN/1.73SQ M
GLUCOSE SERPL-MCNC: 110 MG/DL (ref 65–140)
GLUCOSE SERPL-MCNC: 114 MG/DL (ref 65–140)
GLUCOSE SERPL-MCNC: 120 MG/DL (ref 65–140)
GLUCOSE SERPL-MCNC: 274 MG/DL (ref 65–140)
GLUCOSE SERPL-MCNC: 292 MG/DL (ref 65–140)
GLUCOSE SERPL-MCNC: 347 MG/DL (ref 65–140)
GLUCOSE SERPL-MCNC: 85 MG/DL (ref 65–140)
GLUCOSE SERPL-MCNC: 92 MG/DL (ref 65–140)
GLUCOSE SERPL-MCNC: 95 MG/DL (ref 65–140)
HBA1C MFR BLD: 7.3 % (ref 4.2–6.3)
HCT VFR BLD AUTO: 36.4 % (ref 36.5–49.3)
HGB BLD-MCNC: 11.7 G/DL (ref 12–17)
MCH RBC QN AUTO: 31.3 PG (ref 26.8–34.3)
MCHC RBC AUTO-ENTMCNC: 32.1 G/DL (ref 31.4–37.4)
MCV RBC AUTO: 97 FL (ref 82–98)
PLATELET # BLD AUTO: 72 THOUSANDS/UL (ref 149–390)
PMV BLD AUTO: 11.1 FL (ref 8.9–12.7)
POTASSIUM SERPL-SCNC: 3.6 MMOL/L (ref 3.5–5.3)
RBC # BLD AUTO: 3.74 MILLION/UL (ref 3.88–5.62)
SODIUM SERPL-SCNC: 141 MMOL/L (ref 136–145)
WBC # BLD AUTO: 2.99 THOUSAND/UL (ref 4.31–10.16)

## 2017-09-19 PROCEDURE — 85027 COMPLETE CBC AUTOMATED: CPT | Performed by: PHYSICIAN ASSISTANT

## 2017-09-19 PROCEDURE — 82948 REAGENT STRIP/BLOOD GLUCOSE: CPT

## 2017-09-19 PROCEDURE — 83036 HEMOGLOBIN GLYCOSYLATED A1C: CPT | Performed by: HOSPITALIST

## 2017-09-19 PROCEDURE — 80048 BASIC METABOLIC PNL TOTAL CA: CPT | Performed by: PHYSICIAN ASSISTANT

## 2017-09-19 RX ORDER — INSULIN GLARGINE 100 [IU]/ML
10 INJECTION, SOLUTION SUBCUTANEOUS
Status: DISCONTINUED | OUTPATIENT
Start: 2017-09-19 | End: 2017-09-19

## 2017-09-19 RX ORDER — INSULIN GLARGINE 100 [IU]/ML
15 INJECTION, SOLUTION SUBCUTANEOUS EVERY MORNING
Status: DISCONTINUED | OUTPATIENT
Start: 2017-09-19 | End: 2017-09-20 | Stop reason: HOSPADM

## 2017-09-19 RX ADMIN — SODIUM CHLORIDE 125 ML/HR: 0.9 INJECTION, SOLUTION INTRAVENOUS at 08:28

## 2017-09-19 RX ADMIN — FENOFIBRATE 144 MG: 48 TABLET ORAL at 08:24

## 2017-09-19 RX ADMIN — INSULIN LISPRO 8 UNITS: 100 INJECTION, SOLUTION INTRAVENOUS; SUBCUTANEOUS at 13:25

## 2017-09-19 RX ADMIN — GABAPENTIN 100 MG: 100 CAPSULE ORAL at 08:25

## 2017-09-19 RX ADMIN — INSULIN LISPRO 2 UNITS: 100 INJECTION, SOLUTION INTRAVENOUS; SUBCUTANEOUS at 22:06

## 2017-09-19 RX ADMIN — ASPIRIN 81 MG 81 MG: 81 TABLET ORAL at 08:24

## 2017-09-19 RX ADMIN — ACETAMINOPHEN 650 MG: 325 TABLET, FILM COATED ORAL at 07:53

## 2017-09-19 RX ADMIN — SODIUM CHLORIDE 125 ML/HR: 0.9 INJECTION, SOLUTION INTRAVENOUS at 00:02

## 2017-09-19 RX ADMIN — INSULIN LISPRO 2 UNITS: 100 INJECTION, SOLUTION INTRAVENOUS; SUBCUTANEOUS at 13:26

## 2017-09-19 RX ADMIN — ACETAMINOPHEN 650 MG: 325 TABLET, FILM COATED ORAL at 22:02

## 2017-09-19 RX ADMIN — INSULIN LISPRO 3 UNITS: 100 INJECTION, SOLUTION INTRAVENOUS; SUBCUTANEOUS at 19:20

## 2017-09-19 RX ADMIN — SODIUM CHLORIDE 1.5 UNITS/HR: 9 INJECTION, SOLUTION INTRAVENOUS at 02:03

## 2017-09-19 RX ADMIN — ACETAMINOPHEN 800 MCG: 400 TABLET ORAL at 08:24

## 2017-09-19 RX ADMIN — INSULIN LISPRO 8 UNITS: 100 INJECTION, SOLUTION INTRAVENOUS; SUBCUTANEOUS at 18:04

## 2017-09-19 RX ADMIN — GABAPENTIN 100 MG: 100 CAPSULE ORAL at 18:04

## 2017-09-19 RX ADMIN — INSULIN GLARGINE 15 UNITS: 100 INJECTION, SOLUTION SUBCUTANEOUS at 13:25

## 2017-09-19 RX ADMIN — PREDNISONE 20 MG: 20 TABLET ORAL at 08:25

## 2017-09-20 ENCOUNTER — GENERIC CONVERSION - ENCOUNTER (OUTPATIENT)
Dept: OTHER | Facility: OTHER | Age: 62
End: 2017-09-20

## 2017-09-20 ENCOUNTER — HOSPITAL ENCOUNTER (OUTPATIENT)
Dept: INFUSION CENTER | Facility: CLINIC | Age: 62
Discharge: HOME | End: 2017-09-20
Payer: MEDICARE

## 2017-09-20 VITALS
TEMPERATURE: 97.4 F | DIASTOLIC BLOOD PRESSURE: 70 MMHG | BODY MASS INDEX: 27.35 KG/M2 | OXYGEN SATURATION: 96 % | SYSTOLIC BLOOD PRESSURE: 128 MMHG | WEIGHT: 206.35 LBS | RESPIRATION RATE: 18 BRPM | HEIGHT: 73 IN | HEART RATE: 77 BPM

## 2017-09-20 LAB
ANION GAP SERPL CALCULATED.3IONS-SCNC: 9 MMOL/L (ref 4–13)
BUN SERPL-MCNC: 18 MG/DL (ref 5–25)
CALCIUM SERPL-MCNC: 8.8 MG/DL (ref 8.3–10.1)
CHLORIDE SERPL-SCNC: 106 MMOL/L (ref 100–108)
CO2 SERPL-SCNC: 27 MMOL/L (ref 21–32)
CREAT SERPL-MCNC: 0.93 MG/DL (ref 0.6–1.3)
GFR SERPL CREATININE-BSD FRML MDRD: 87 ML/MIN/1.73SQ M
GLUCOSE SERPL-MCNC: 175 MG/DL (ref 65–140)
GLUCOSE SERPL-MCNC: 182 MG/DL (ref 65–140)
GLUCOSE SERPL-MCNC: 195 MG/DL (ref 65–140)
GLUCOSE SERPL-MCNC: 228 MG/DL (ref 65–140)
GLUCOSE SERPL-MCNC: 237 MG/DL (ref 65–140)
POTASSIUM SERPL-SCNC: 3.3 MMOL/L (ref 3.5–5.3)
SODIUM SERPL-SCNC: 142 MMOL/L (ref 136–145)

## 2017-09-20 PROCEDURE — 82948 REAGENT STRIP/BLOOD GLUCOSE: CPT

## 2017-09-20 PROCEDURE — 80048 BASIC METABOLIC PNL TOTAL CA: CPT | Performed by: HOSPITALIST

## 2017-09-20 RX ORDER — BLOOD PRESSURE TEST KIT
KIT MISCELLANEOUS
Qty: 100 EACH | Refills: 0 | Status: SHIPPED | OUTPATIENT
Start: 2017-09-20 | End: 2017-10-24 | Stop reason: ALTCHOICE

## 2017-09-20 RX ORDER — BLOOD-GLUCOSE METER
1 KIT MISCELLANEOUS AS NEEDED
Qty: 1 EACH | Refills: 0 | Status: SHIPPED | OUTPATIENT
Start: 2017-09-20

## 2017-09-20 RX ORDER — LANCETS 28 GAUGE
EACH MISCELLANEOUS
Qty: 100 EACH | Refills: 0 | Status: SHIPPED | OUTPATIENT
Start: 2017-09-20

## 2017-09-20 RX ORDER — INSULIN GLARGINE 100 [IU]/ML
15 INJECTION, SOLUTION SUBCUTANEOUS EVERY MORNING
Qty: 10 ML | Refills: 0 | Status: SHIPPED | OUTPATIENT
Start: 2017-09-20 | End: 2018-06-08

## 2017-09-20 RX ORDER — PREDNISONE 20 MG/1
20 TABLET ORAL DAILY
Refills: 0
Start: 2017-09-20 | End: 2018-04-23 | Stop reason: DRUGHIGH

## 2017-09-20 RX ADMIN — ACETAMINOPHEN 800 MCG: 400 TABLET ORAL at 08:23

## 2017-09-20 RX ADMIN — ACETAMINOPHEN 650 MG: 325 TABLET, FILM COATED ORAL at 04:19

## 2017-09-20 RX ADMIN — PREDNISONE 20 MG: 20 TABLET ORAL at 08:22

## 2017-09-20 RX ADMIN — INSULIN GLARGINE 15 UNITS: 100 INJECTION, SOLUTION SUBCUTANEOUS at 08:22

## 2017-09-20 RX ADMIN — FENOFIBRATE 144 MG: 48 TABLET ORAL at 08:23

## 2017-09-20 RX ADMIN — INSULIN LISPRO 1 UNITS: 100 INJECTION, SOLUTION INTRAVENOUS; SUBCUTANEOUS at 08:24

## 2017-09-20 RX ADMIN — INSULIN LISPRO 8 UNITS: 100 INJECTION, SOLUTION INTRAVENOUS; SUBCUTANEOUS at 08:24

## 2017-09-20 RX ADMIN — GABAPENTIN 100 MG: 100 CAPSULE ORAL at 08:22

## 2017-09-20 RX ADMIN — INSULIN LISPRO 2 UNITS: 100 INJECTION, SOLUTION INTRAVENOUS; SUBCUTANEOUS at 09:54

## 2017-09-20 RX ADMIN — ASPIRIN 81 MG 81 MG: 81 TABLET ORAL at 08:22

## 2017-09-21 DIAGNOSIS — C91.10 CHRONIC LYMPHOCYTIC LEUKEMIA OF B-CELL TYPE NOT HAVING ACHIEVED REMISSION (HCC): ICD-10-CM

## 2017-09-22 RX ORDER — SODIUM CHLORIDE 9 MG/ML
20 INJECTION, SOLUTION INTRAVENOUS CONTINUOUS
Status: DISCONTINUED | OUTPATIENT
Start: 2017-09-25 | End: 2017-09-28 | Stop reason: HOSPADM

## 2017-09-22 RX ORDER — ACETAMINOPHEN 325 MG/1
650 TABLET ORAL ONCE
Status: COMPLETED | OUTPATIENT
Start: 2017-09-25 | End: 2017-09-25

## 2017-09-25 ENCOUNTER — HOSPITAL ENCOUNTER (OUTPATIENT)
Dept: INFUSION CENTER | Facility: CLINIC | Age: 62
Discharge: HOME | End: 2017-09-25
Payer: MEDICARE

## 2017-09-25 VITALS
HEART RATE: 72 BPM | DIASTOLIC BLOOD PRESSURE: 76 MMHG | TEMPERATURE: 98 F | SYSTOLIC BLOOD PRESSURE: 134 MMHG | RESPIRATION RATE: 18 BRPM

## 2017-09-25 LAB
ALBUMIN SERPL BCP-MCNC: 3.4 G/DL (ref 3.2–5)
ALP SERPL-CCNC: 36 U/L (ref 46–116)
ALT SERPL W P-5'-P-CCNC: 23 U/L (ref 12–78)
ANION GAP SERPL CALCULATED.3IONS-SCNC: 4 MMOL/L (ref 4–13)
AST SERPL W P-5'-P-CCNC: 24 U/L (ref 5–45)
BASOPHILS # BLD AUTO: 0 THOUSAND/UL (ref 0–0.1)
BASOPHILS NFR MAR MANUAL: 0 % (ref 0–1)
BILIRUB DIRECT SERPL-MCNC: 0.22 MG/DL (ref 0–0.2)
BILIRUB SERPL-MCNC: 0.9 MG/DL (ref 0.2–1)
BUN SERPL-MCNC: 22 MG/DL (ref 5–25)
CALCIUM SERPL-MCNC: 8.9 MG/DL (ref 8.3–10.1)
CHLORIDE SERPL-SCNC: 105 MMOL/L (ref 98–108)
CO2 SERPL-SCNC: 27 MMOL/L (ref 21–32)
CREAT SERPL-MCNC: 0.9 MG/DL (ref 0.6–1.3)
EOSINOPHIL # BLD AUTO: 0.03 THOUSAND/UL (ref 0–0.61)
EOSINOPHIL NFR BLD MANUAL: 1 % (ref 0–6)
ERYTHROCYTE [DISTWIDTH] IN BLOOD BY AUTOMATED COUNT: 13.4 % (ref 11.6–15.1)
GFR SERPL CREATININE-BSD FRML MDRD: 91 ML/MIN/1.73SQ M
GLUCOSE SERPL-MCNC: 253 MG/DL (ref 65–140)
HCT VFR BLD AUTO: 32.1 % (ref 36.5–49.3)
HGB BLD-MCNC: 10.6 G/DL (ref 12–17)
LG PLATELETS BLD QL SMEAR: PRESENT
LYMPHOCYTES # BLD AUTO: 1.35 THOUSAND/UL (ref 0.6–4.47)
LYMPHOCYTES # BLD AUTO: 52 % (ref 14–44)
MCH RBC QN AUTO: 30.7 PG (ref 26.8–34.3)
MCHC RBC AUTO-ENTMCNC: 33.1 G/DL (ref 31.4–37.4)
MCV RBC AUTO: 93 FL (ref 82–98)
MONOCYTES # BLD AUTO: 0.16 THOUSAND/UL (ref 0–1.22)
MONOCYTES NFR BLD AUTO: 6 % (ref 4–12)
NEUTS BAND NFR BLD MANUAL: 1 % (ref 0–8)
NEUTS SEG # BLD: 1.07 THOUSAND/UL (ref 1.81–6.82)
NEUTS SEG NFR BLD AUTO: 40 % (ref 43–75)
OVALOCYTES BLD QL SMEAR: PRESENT
PLATELET # BLD AUTO: 82 THOUSANDS/UL (ref 149–390)
PLATELET BLD QL SMEAR: ABNORMAL
PMV BLD AUTO: 7.5 FL (ref 8.9–12.7)
POTASSIUM SERPL-SCNC: 3.8 MMOL/L (ref 3.5–5.3)
PROT SERPL-MCNC: 5.1 G/DL (ref 6.4–8.2)
RBC # BLD AUTO: 3.45 MILLION/UL (ref 3.88–5.62)
RETICS # AUTO: ABNORMAL 10*3/UL (ref 14356–105094)
RETICS # CALC: 2.9 % (ref 0.37–1.87)
SODIUM SERPL-SCNC: 136 MMOL/L (ref 136–145)
TOTAL CELLS COUNTED SPEC: 100
WBC # BLD AUTO: 2.6 THOUSAND/UL (ref 4.31–10.16)
WBC NRBC COR # BLD: 2.6 THOUSAND/UL (ref 4.31–10.16)

## 2017-09-25 PROCEDURE — 85027 COMPLETE CBC AUTOMATED: CPT | Performed by: INTERNAL MEDICINE

## 2017-09-25 PROCEDURE — 85045 AUTOMATED RETICULOCYTE COUNT: CPT | Performed by: INTERNAL MEDICINE

## 2017-09-25 PROCEDURE — 82248 BILIRUBIN DIRECT: CPT | Performed by: INTERNAL MEDICINE

## 2017-09-25 PROCEDURE — 96375 TX/PRO/DX INJ NEW DRUG ADDON: CPT

## 2017-09-25 PROCEDURE — 96415 CHEMO IV INFUSION ADDL HR: CPT

## 2017-09-25 PROCEDURE — 80053 COMPREHEN METABOLIC PANEL: CPT | Performed by: INTERNAL MEDICINE

## 2017-09-25 PROCEDURE — 85007 BL SMEAR W/DIFF WBC COUNT: CPT | Performed by: INTERNAL MEDICINE

## 2017-09-25 PROCEDURE — 96413 CHEMO IV INFUSION 1 HR: CPT

## 2017-09-25 RX ORDER — ONDANSETRON HYDROCHLORIDE 8 MG/1
TABLET, FILM COATED ORAL EVERY 8 HOURS PRN
COMMUNITY
End: 2017-10-14 | Stop reason: HOSPADM

## 2017-09-25 RX ORDER — PANTOPRAZOLE SODIUM 40 MG/1
40 TABLET, DELAYED RELEASE ORAL DAILY
COMMUNITY
End: 2018-06-18 | Stop reason: SDUPTHER

## 2017-09-25 RX ADMIN — OBINUTUZUMAB 1000 MG: 1000 INJECTION, SOLUTION, CONCENTRATE INTRAVENOUS at 10:28

## 2017-09-25 RX ADMIN — ACETAMINOPHEN 650 MG: 325 TABLET, FILM COATED ORAL at 09:07

## 2017-09-25 RX ADMIN — DEXAMETHASONE SODIUM PHOSPHATE 20 MG: 10 INJECTION, SOLUTION INTRAMUSCULAR; INTRAVENOUS at 09:00

## 2017-09-25 RX ADMIN — DIPHENHYDRAMINE HYDROCHLORIDE 25 MG: 50 INJECTION, SOLUTION INTRAMUSCULAR; INTRAVENOUS at 09:15

## 2017-09-25 RX ADMIN — SODIUM CHLORIDE 20 ML/HR: 0.9 INJECTION, SOLUTION INTRAVENOUS at 09:00

## 2017-09-25 NOTE — PROGRESS NOTES
Pt tolerated Gazyva infusion well without any adverse reaction  Pt left unit in stable condition without question or concern  AVS provided

## 2017-09-25 NOTE — PROGRESS NOTES
Pt arrived to unit without complaint, CBCD CMP drawn and results within approved parameters on chemo orders for [facility] today   Pt was hospitalized last week for hyperglycemia, pt has been seen by Dr Arielle Brown post discharge from hospital  Pt is monitoring his blood sugars daily before meals and self administering insulin as ordered upon d/c from hospital

## 2017-09-27 DIAGNOSIS — C91.10 CHRONIC LYMPHOCYTIC LEUKEMIA OF B-CELL TYPE NOT HAVING ACHIEVED REMISSION (HCC): ICD-10-CM

## 2017-09-27 DIAGNOSIS — D58.9 HEREDITARY HEMOLYTIC ANEMIA (HCC): ICD-10-CM

## 2017-09-27 DIAGNOSIS — E78.5 HYPERLIPIDEMIA: ICD-10-CM

## 2017-10-02 ENCOUNTER — GENERIC CONVERSION - ENCOUNTER (OUTPATIENT)
Dept: OTHER | Facility: OTHER | Age: 62
End: 2017-10-02

## 2017-10-02 ENCOUNTER — HOSPITAL ENCOUNTER (OUTPATIENT)
Dept: INFUSION CENTER | Facility: CLINIC | Age: 62
Discharge: HOME | End: 2017-10-02
Payer: MEDICARE

## 2017-10-02 LAB
ALBUMIN SERPL BCP-MCNC: 3.8 G/DL (ref 3.2–5)
ALP SERPL-CCNC: 37 U/L (ref 46–116)
ALT SERPL W P-5'-P-CCNC: 25 U/L (ref 12–78)
ANION GAP SERPL CALCULATED.3IONS-SCNC: 9 MMOL/L (ref 4–13)
ANISOCYTOSIS BLD QL SMEAR: PRESENT
AST SERPL W P-5'-P-CCNC: 26 U/L (ref 5–45)
BASOPHILS # BLD AUTO: 0 THOUSAND/UL (ref 0–0.1)
BASOPHILS NFR MAR MANUAL: 0 % (ref 0–1)
BILIRUB DIRECT SERPL-MCNC: 0.27 MG/DL (ref 0–0.2)
BILIRUB SERPL-MCNC: 1.3 MG/DL (ref 0.2–1)
BUN SERPL-MCNC: 17 MG/DL (ref 5–25)
CALCIUM SERPL-MCNC: 9.4 MG/DL (ref 8.3–10.1)
CHLORIDE SERPL-SCNC: 103 MMOL/L (ref 98–108)
CO2 SERPL-SCNC: 28 MMOL/L (ref 21–32)
CREAT SERPL-MCNC: 0.9 MG/DL (ref 0.6–1.3)
EOSINOPHIL # BLD AUTO: 0.06 THOUSAND/UL (ref 0–0.61)
EOSINOPHIL NFR BLD MANUAL: 2 % (ref 0–6)
ERYTHROCYTE [DISTWIDTH] IN BLOOD BY AUTOMATED COUNT: 13.9 % (ref 11.6–15.1)
GFR SERPL CREATININE-BSD FRML MDRD: 91 ML/MIN/1.73SQ M
GLUCOSE SERPL-MCNC: 140 MG/DL (ref 65–140)
HCT VFR BLD AUTO: 37.6 % (ref 36.5–49.3)
HGB BLD-MCNC: 12.4 G/DL (ref 12–17)
LYMPHOCYTES # BLD AUTO: 1.18 THOUSAND/UL (ref 0.6–4.47)
LYMPHOCYTES # BLD AUTO: 38 % (ref 14–44)
MCH RBC QN AUTO: 30.6 PG (ref 26.8–34.3)
MCHC RBC AUTO-ENTMCNC: 33 G/DL (ref 31.4–37.4)
MCV RBC AUTO: 93 FL (ref 82–98)
MONOCYTES # BLD AUTO: 0.31 THOUSAND/UL (ref 0–1.22)
MONOCYTES NFR BLD AUTO: 10 % (ref 4–12)
NEUTS BAND NFR BLD MANUAL: 4 % (ref 0–8)
NEUTS SEG # BLD: 1.43 THOUSAND/UL (ref 1.81–6.82)
NEUTS SEG NFR BLD AUTO: 42 % (ref 43–75)
PLATELET # BLD AUTO: 84 THOUSANDS/UL (ref 149–390)
PLATELET BLD QL SMEAR: ABNORMAL
PMV BLD AUTO: 7.5 FL (ref 8.9–12.7)
POTASSIUM SERPL-SCNC: 3.9 MMOL/L (ref 3.5–5.3)
PROT SERPL-MCNC: 5.8 G/DL (ref 6.4–8.2)
RBC # BLD AUTO: 4.05 MILLION/UL (ref 3.88–5.62)
RETICS # AUTO: ABNORMAL 10*3/UL (ref 14356–105094)
RETICS # CALC: 2.74 % (ref 0.37–1.87)
SODIUM SERPL-SCNC: 140 MMOL/L (ref 136–145)
TOTAL CELLS COUNTED SPEC: 100
VARIANT LYMPHS # BLD AUTO: 4 % (ref 0–0)
WBC # BLD AUTO: 3.1 THOUSAND/UL (ref 4.31–10.16)
WBC NRBC COR # BLD: 3.1 THOUSAND/UL (ref 4.31–10.16)

## 2017-10-02 PROCEDURE — 82248 BILIRUBIN DIRECT: CPT | Performed by: INTERNAL MEDICINE

## 2017-10-02 PROCEDURE — 85045 AUTOMATED RETICULOCYTE COUNT: CPT | Performed by: INTERNAL MEDICINE

## 2017-10-02 PROCEDURE — 85007 BL SMEAR W/DIFF WBC COUNT: CPT | Performed by: INTERNAL MEDICINE

## 2017-10-02 PROCEDURE — 85027 COMPLETE CBC AUTOMATED: CPT | Performed by: INTERNAL MEDICINE

## 2017-10-02 PROCEDURE — 80053 COMPREHEN METABOLIC PANEL: CPT | Performed by: INTERNAL MEDICINE

## 2017-10-02 NOTE — PLAN OF CARE
Problem: Potential for Falls  Goal: Patient will remain free of falls  INTERVENTIONS:  - Assess patient frequently for physical needs  -  Identify cognitive and physical deficits and behaviors that affect risk of falls    -  [place] fall precautions as indicated by assessment   - Educate patient/family on patient safety including physical limitations  - Instruct patient to call for assistance with activity based on assessment  - Modify environment to reduce risk of injury  - Consider OT/PT consult to assist with strengthening/mobility   Outcome: Progressing

## 2017-10-03 ENCOUNTER — ALLSCRIPTS OFFICE VISIT (OUTPATIENT)
Dept: OTHER | Facility: OTHER | Age: 62
End: 2017-10-03

## 2017-10-04 ENCOUNTER — GENERIC CONVERSION - ENCOUNTER (OUTPATIENT)
Dept: OTHER | Facility: OTHER | Age: 62
End: 2017-10-04

## 2017-10-04 ENCOUNTER — ANESTHESIA EVENT (OUTPATIENT)
Dept: GASTROENTEROLOGY | Facility: HOSPITAL | Age: 62
End: 2017-10-04
Payer: MEDICARE

## 2017-10-04 RX ORDER — TRAMADOL HYDROCHLORIDE 50 MG/1
50 TABLET ORAL EVERY 6 HOURS PRN
Status: ON HOLD | COMMUNITY
End: 2017-10-05 | Stop reason: ALTCHOICE

## 2017-10-04 RX ORDER — MAGNESIUM GLUCONATE 30 MG(550)
1 TABLET ORAL DAILY
Status: ON HOLD | COMMUNITY
End: 2017-10-05 | Stop reason: ALTCHOICE

## 2017-10-04 RX ORDER — BENZONATATE 200 MG/1
200 CAPSULE ORAL 3 TIMES DAILY PRN
Status: ON HOLD | COMMUNITY
End: 2018-03-08

## 2017-10-04 RX ORDER — DOXYCYCLINE HYCLATE 100 MG
100 TABLET ORAL 2 TIMES DAILY
Status: ON HOLD | COMMUNITY
End: 2017-10-05 | Stop reason: ALTCHOICE

## 2017-10-04 RX ORDER — FOLIC ACID 1 MG/1
800 TABLET ORAL DAILY
COMMUNITY

## 2017-10-04 RX ORDER — CITALOPRAM 40 MG/1
40 TABLET ORAL DAILY
COMMUNITY
End: 2019-01-13 | Stop reason: SDUPTHER

## 2017-10-05 ENCOUNTER — HOSPITAL ENCOUNTER (OUTPATIENT)
Facility: HOSPITAL | Age: 62
Setting detail: OUTPATIENT SURGERY
Discharge: HOME | End: 2017-10-05
Attending: INTERNAL MEDICINE | Admitting: INTERNAL MEDICINE
Payer: MEDICARE

## 2017-10-05 ENCOUNTER — GENERIC CONVERSION - ENCOUNTER (OUTPATIENT)
Dept: OTHER | Facility: OTHER | Age: 62
End: 2017-10-05

## 2017-10-05 ENCOUNTER — ANESTHESIA (OUTPATIENT)
Dept: GASTROENTEROLOGY | Facility: HOSPITAL | Age: 62
End: 2017-10-05
Payer: MEDICARE

## 2017-10-05 VITALS
OXYGEN SATURATION: 99 % | RESPIRATION RATE: 20 BRPM | HEIGHT: 73 IN | HEART RATE: 90 BPM | WEIGHT: 209 LBS | TEMPERATURE: 97.9 F | SYSTOLIC BLOOD PRESSURE: 117 MMHG | BODY MASS INDEX: 27.7 KG/M2 | DIASTOLIC BLOOD PRESSURE: 60 MMHG

## 2017-10-05 DIAGNOSIS — K21.00 GASTRO-ESOPHAGEAL REFLUX DISEASE WITH ESOPHAGITIS: ICD-10-CM

## 2017-10-05 DIAGNOSIS — K22.10 ULCER OF ESOPHAGUS WITHOUT BLEEDING: ICD-10-CM

## 2017-10-05 LAB — GLUCOSE SERPL-MCNC: 109 MG/DL (ref 65–140)

## 2017-10-05 PROCEDURE — 88342 IMHCHEM/IMCYTCHM 1ST ANTB: CPT | Performed by: INTERNAL MEDICINE

## 2017-10-05 PROCEDURE — 88312 SPECIAL STAINS GROUP 1: CPT | Performed by: INTERNAL MEDICINE

## 2017-10-05 PROCEDURE — 88305 TISSUE EXAM BY PATHOLOGIST: CPT | Performed by: INTERNAL MEDICINE

## 2017-10-05 PROCEDURE — 82948 REAGENT STRIP/BLOOD GLUCOSE: CPT

## 2017-10-05 PROCEDURE — 88341 IMHCHEM/IMCYTCHM EA ADD ANTB: CPT | Performed by: INTERNAL MEDICINE

## 2017-10-05 RX ORDER — PROPOFOL 10 MG/ML
INJECTION, EMULSION INTRAVENOUS AS NEEDED
Status: DISCONTINUED | OUTPATIENT
Start: 2017-10-05 | End: 2017-10-05 | Stop reason: SURG

## 2017-10-05 RX ORDER — SUCRALFATE ORAL 1 G/10ML
1 SUSPENSION ORAL 4 TIMES DAILY
Qty: 420 ML | Refills: 0 | Status: SHIPPED | OUTPATIENT
Start: 2017-10-05 | End: 2017-12-19 | Stop reason: ALTCHOICE

## 2017-10-05 RX ORDER — SODIUM CHLORIDE 9 MG/ML
125 INJECTION, SOLUTION INTRAVENOUS CONTINUOUS
Status: DISCONTINUED | OUTPATIENT
Start: 2017-10-05 | End: 2017-10-05 | Stop reason: HOSPADM

## 2017-10-05 RX ADMIN — PROPOFOL 200 MG: 10 INJECTION, EMULSION INTRAVENOUS at 15:35

## 2017-10-05 RX ADMIN — PROPOFOL 100 MG: 10 INJECTION, EMULSION INTRAVENOUS at 15:41

## 2017-10-05 RX ADMIN — SODIUM CHLORIDE 125 ML/HR: 0.9 INJECTION, SOLUTION INTRAVENOUS at 14:24

## 2017-10-05 NOTE — PROGRESS NOTES
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 : No  Self-Management Education/Trainng : Living Well with Diabetes Education      Program  Care Summary provided  : Yes   · *1 - [facility] MEDICAL NUTRITION THERAPY FOR DIABETES Co-Management  *  Status:  Need Information - Financial Authorization  Requested for: 21UGT9730   Ordered; For: Steroid-induced diabetes; Ordered By: Ferdinand Paris Performed:  Due: 37PLJ8057  Care Summary provided  : Yes   · (1) COMPREHENSIVE METABOLIC PANEL; Status:Active; Requested for:42Rih4233;    Perform:[facility]; Due:83Emo8843; Ordered; For:Steroid-induced diabetes; Ordered By:Dameon Jerez;   · (1) HEMOGLOBIN A1C; Status:Active; Requested for:16Rgd1521;    Perform:[facility]; Due:47Jnd8742; Ordered; For:Steroid-induced diabetes; Ordered By:Dameon Jerez;    Discussion/Summary  Discussion Summary:   Steroid induced diabetes: Review of his blood sugars reveals higher blood sugars at lunchtime in the 180-250 range  I have asked him to increase his breakfast Humalog dose from 12 to 15 units while keeping his lunchtime and dinnertime 12 unit dose  He will continue to check his blood sugars 4 times daily and for the record to the office in 2 weeks for review with if necessary  As his steroid dose increases or decreases, he may require more or less insulin  He will contact us with any changes in his treatment regimen so that we can titrate his insulin therapy accordingly  Referred to diabetic education medical nutrition therapy to learn more about his diabetes and diet   He is being treated with intensive insulin therapy which increases his risk for hypoglycemia  Episodes of hypoglycemia can lead to permanent disability and death  Check hemoglobin A1c and comprehensive metabolic panel prior to next visit  Dyslipidemia: Continue fenofibrate  Counseling Documentation With Imm: The patient was counseled regarding diagnostic results,-instructions for management,-risk factor reductions,-patient and family education,-impressions,-risks and benefits of treatment options,-importance of compliance with treatment  Medication SE Review and Pt Understands Tx: Possible side effects of new medications were reviewed with the patient/guardian today  The treatment plan was reviewed with the patient/guardian  The patient/guardian understands and agrees with the treatment plan      Chief Complaint  Chief Complaint Free Text Note Form: Follow up      History of Present Illness  HPI: 14-year-old male presents in the office today for follow-up of steroid induced diabetes after hospitalization for hyperglycemia  He has a history of CLL and no prior history of diabetes who presented in the hospital after he was found to have severe hyperglycemia on routine labs  His most recent hemoglobin A1c from September 19, 2017 was 7 3  He denies any personal or family history of diabetes  He has had a history of CLL and has been treated with steroids for the past several years  Before being admitted to the hospital a he noticed increasing polydipsia and blurry vision with worsening polyuria  His current diabetic medication regimen is as follows:15 units in the ijdgniv95 units before mealsdenies any recent episodes of hypoglycemia, polyuria, polydipsia or polyphagia since being discharged from the hospital  He remains on steroid therapy as part of his chemotherapy regimen, prednisone 20 mg twice daily  hyperlipidemia is treated with fenofibrate 50 mg daily        Review of Systems  Endo Adult ROS Male Established v2 - [facility]: Constitutional/General: recent weight gain,-recent weight loss,-poor energy/fatigue,-no increased energy level,-insomnia/sleep problems,-fever-and-feeling weak  Heart: high blood pressure-and-rapid/racing heart rate, but-no chest pain/tightness-and-no palpitations  Genitourinary - Urinary frequent urination,-excess urination-and-urinating during the night  Eyes: blurred vision, but-no double vision,-no bulging eyes,-no gritty/scratchy eyes-and-no excessive tearing  Mouth / Throat: hoarseness-and-difficulty swallowing  Neck: no lumps,-no swollen glands,-no neck pain,-no neck stiffness-and-no enlarged thyroid  Respiratory: wheezing,-no asthma-and-no persistent cough  Musculoskeletal: no muscle aches/pain,-no joint aches/pain-and-no muscle weakness  Skin & Hair: no dry skin,-no acne,-the hair texture was not oily,-hair loss-and-no excessive hair growth  Gastrointestinal: no constipation,-no diarrhea,-no waking at night to drink-and-no stomach ache  Neurological: no blackouts,-no weakness-and-no tremors  Genital: no testicular pain-and-no testicular lumps/bumps/mass  Endocrine: feeling hot frequently,-feeling cold frequently,-shifts between feeling hot and cold,-cold hands or feet,-no excessive sweating,-no thyroid problems,-blood sugar problems,-no excessive thirst,-excessive hunger,-no change in shoe size,-no nausea or vomiting-and-no shaky hands  ROS Reviewed:   ROS reviewed  Active Problems  1  Abdominal pain (789 00) (R10 9)   2  Anemia (285 9) (D64 9)   3  Cellulitis of hand without finger or thumb, right (682 4) (L03 113)   4  Chemotherapy-induced nausea (787 02,E933 1) (R11 0,T45  1X5A)   5  Chronic kidney disease, unspecified CKD stage (585 9) (N18 9)   6  Chronic lymphocytic leukemia (204 10) (C91 10)   7  Chronic maxillary sinusitis (473 0) (J32 0)   8  Coronary artery disease (414 00) (I25 10)   9  Dyslipidemia (272 4) (E78 5)   10  Dysphagia (787 20) (R13 10)   11  Esophagitis, reflux (530 11) (K21 0)   12  Heart disease (429 9) (I51 9)   13  Hemolytic anemia (283 9) (D58 9)   14  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,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)   15  History of hemolytic anemia (V12 3) (Z86 2)   16  Hypokalemia (276 8) (E87 6)   17  Joint pain (719 40) (M25 50)   18  Leukopenia (288 50) (D72 819)   19  Lower extremity edema (782 3) (R60 0)   20  Muscle Cramps (729 82)   21  Neutropenia (288 00) (D70 9)   22  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) (R76 12)   23  Thrombocytopenia (287 5) (D69 6)   24  Ulcer of esophagus without bleeding (530 20) (K22 10)    Past Medical History  1  History of Acute myocardial infarction (410 90) (I21 9)   2  History of Coronary Artery Disease (V12 59)   3  History of Dyslipidemia (272 4) (E78 5)   4  History of acute bronchitis (V12 69) (Z87 09)   5  History of bronchitis (V12 69) (Z87 09)   6  History of hemolytic anemia (V12 3) (Z86 2)   7  History of hypertension (V12 59) (Z86 79)   8  History of neutropenia (V12 3) (Z86 2)   9  History of thrombocytopenia (V12 3) (Z86 2)  Active Problems And Past Medical History Reviewed: The active problems and past medical history were reviewed and updated today  Surgical History  1  History of Cardiac Cath Lesion 1, 1st Adjunct Treat Device: Stent   2  History of Foot Surgery   3  History of Hand Surgery   4  History of Knee Surgery  Surgical History Reviewed: The surgical history was reviewed and updated today  Family History  Mother    1  Family history of Chronic Leukemia (V16 6)   2  Denied: Family history of Colon cancer   3  Denied: Family history of colon cancer   4  Denied: Family history of Crohn's disease   5  Denied: Family history of liver disease   6  Family history of Polyps Of The Sigmoid Colon  Father    7  Denied: Family history of Colon cancer   8  Denied: Family history of colon cancer   9  Denied: Family history of Crohn's disease   10   Denied: Family history of liver disease  Family History Reviewed: The family history was reviewed and updated today  Social History   · Being A Social Drinker   · Current every day smoker (305 1) (F17 200)  Social History Reviewed: The social history was reviewed and updated today  Current Meds   1  Aspirin 81 MG Oral Tablet Delayed Release; Therapy: 11Sep2017 to Recorded   2  Benzonatate 200 MG Oral Capsule; TAKE 1 CAPSULE 3 TIMES DAILY AS NEEDED; Therapy: 83BOO4494 to (Evaluate:07Oct2017)  Requested for: 27Tqi6146; Last   Rx:07Sep2017 Ordered   3  Citalopram Hydrobromide 40 MG Oral Tablet; Therapy: 11Sep2017 to Recorded   4  Clotrimazole 10 MG Mouth/Throat Cindy; ALLOW 1 CINDY TO SLOWLY DISSOLVE IN   MOUTH  4 TIMES A DAY; Therapy: 01JPH0033 to (Evaluate:78Drm8915)  Requested for: 62JIE0417; Last   Rx:15Mar2017 Ordered   5  Doxycycline Hyclate 100 MG Oral Tablet; TAKE 1 TABLET EVERY 12 HOURS DAILY; Therapy: 55RFZ5937 to (Evaluate:15Sep2017)  Requested for: 92Vvw2342; Last   Rx:05Sep2017 Ordered   6  Fenofibrate 50 MG Oral Capsule; TAKE 1 CAPSULE DAILY WITH A MEAL; Therapy: (Recorded:05Apr2017) to Recorded   7  Folic Acid 1 MG Oral Tablet; Therapy: (Recorded:07Mar2014) to Recorded   8  Gabapentin 100 MG Oral Capsule; TAKE 1 CAPSULE 3 TIMES DAILY; Therapy: 18UOB4085 to (Jimrodney Middleton)  Requested for: 81Von5152; Last   Rx:69Mzh0950 Ordered   9  Gazyva 1000 MG/40ML Intravenous Solution; Therapy: 11Sep2017 to Recorded   10  GNP Potassium Gluconate 595 (99 K) MG Oral Tablet; Take 1 tablet daily Recorded   11  Imbruvica 140 MG Oral Capsule; TAKE 2 CAPSULE Daily; Therapy: 11Sep2017 to (Evaluate:43Kxe4203)  Requested for: 71Yyf3966; Last    Rx:80Uaj9401 Ordered   12  Ondansetron 8 MG Oral Tablet Disintegrating; 1 tab PO Q 8 hr PRN nausea; Therapy: 61LXW0629 to (Last Rx:15Mar2017)  Requested for: 87JJJ9675 Ordered   13  Pantoprazole Sodium 40 MG Oral Tablet Delayed Release; TAKE 1 TABLET TWICE A DAY;     Therapy: 59IGQ4098 to (Last Rx:61Tiw3548)  Requested for: 64Vqr0369 Ordered   14  Potassium Chloride ER 10 MEQ Oral Tablet Extended Release; one daily  or as advised; Therapy: 12PQY8549 to ([id number])  Requested for: 60ZZZ8733; Last    Rx:89Ucl6119 Ordered   15  PredniSONE 20 MG Oral Tablet; TAKE 2 TABLETS DAILY WITH FOOD AS DIRECTED; Therapy: 49XAD7104 to (Evaluate:75Hfb9299)  Requested for: 24FVD7597; Last    Rx:57Jlr8105 Ordered   16  TraMADol HCl - 50 MG Oral Tablet; TAKE 1 TABLET EVERY 6 HOURS AS NEEDED FOR    ONGOING PAIN;    Therapy: 76WMM0784 to (Last Rx:03Qlk2416) Ordered  Medication List Reviewed: The medication list was reviewed and updated today  Allergies  1  Heparin    Vitals  Vital Signs    Recorded: 54SXR8777 01:15PM   Heart Rate 94   Systolic 920   Diastolic 72   Height 6 ft    Weight 208 lb 8 0 oz   BMI Calculated 28 28   BSA Calculated 2 17     Physical Exam    Constitutional   General appearance: No acute distress, well appearing and well nourished  Eyes   Conjunctiva and lids: No swelling, erythema, or discharge  Pupils: Equal, round and reactive to light  The sclera are anicteric  Extraocular movements are intact  Ears, Nose, Mouth, and Throat   External inspection of ears, nose and lips: Normal     Oropharynx: Normal with no erythema, edema, exudate or lesions  Exam of Head: The head is atraumatic and normocephalic  Neck: The neck is supple  The thyroid is normal in size with no palpable nodules  Pulmonary   Respiratory effort: No increased work of breathing or signs of respiratory distress  Auscultation of lungs: Clear to auscultation bilaterally with normal chest expansion  Cardiovascular   Auscultation of heart: Normal rate and rhythm with no murmurs, gallops or rubs  Examination of pulses: Dorsalis pedal pulses are +2 and equal bilaterally  Examination of Carotids: No bruits      Abdomen   Abdomen: Abdomen is soft, non-tender with normal bowel sounds  Liver and spleen: No hepatomegaly or splenomegaly  Lymphatic   Palpation of lymph nodes: No supraclavicular or suboccipital lymphadenopathy  Musculoskeletal   Gait and station: Normal     Digits and nails: Normal without clubbing or cyanosis  Inspection/palpation of joints, bones, and muscles: Muscle bulk and tone is normal     Skin   Skin and subcutaneous tissue: Abnormal  -Dry skin and brusing  Neurologic   Cranial nerves: Cranial nerves 2-12 intact  Reflexes: 2+ and symmetric  Sensation: No sensory loss  Motor Strength: Strength is 5/5 bilaterally  Psychiatric   Orientation to person, place and time: Normal     Mood and affect: Affect and attention span are normal       Socks and shoes removed, Right Foot Findings: normal foot, no swelling, no erythema  The right toes were normal-and-had full range of motion  The sensory exam showed normal vibratory sensation at the level of the toes on the right  Normal tactile sensation with monofilament testing throughout the right foot  Socks and shoes removed, Left Foot Findings: normal foot, no swelling, no erythema  The left toes were normal-and-had full range of motion  The sensory exam showed normal vibratory sensation at the level of the toes on the left  Normal tactile sensation with monofilament testing throughout the left foot  Pulses:   2+ in the dorsalis pedis on the right  Pulses:   2+ in the dorsalis pedis on the left  Health Management  Esophagitis, reflux   EGD; every 3 months; Last 12Aug2015; Next Due: 30JPH5180; Overdue    Future Appointments    Date/Time Provider Specialty Site   10/05/2017 02:45 PM Otis Hansen DO Gastroenterology [facility] OUTPATIENT   10/18/2017 09:30 AM Mesha Barfield [facility] Hematology Oncology CANCER CARE MEDICAL ONCOLOGY   01/09/2018 09:20 AM TYSHAWN Allen   Endocrinology [facility] ENDOCRINOLOGY     Signatures   Electronically signed by : Geni Oliveira; Oct  3 2017  8:13PM EST                       (Author)    Electronically signed by : TYSHAWN Rosas ; Oct  4 2017  7:56AM EST

## 2017-10-05 NOTE — ANESTHESIA PREPROCEDURE EVALUATION
Review of Systems/Medical History  Patient summary reviewed  Chart reviewed      Cardiovascular  Hyperlipidemia, Hypertension controlled, Past MI > 6 months, CAD, , Cardiac stents > 1 year and drug eluting    Pulmonary  Smoker cigarette smoker and ex-smoker more than 10 packs per year , COPD , ,        GI/Hepatic      Comment: ?esophageal ulcer          Endo/Other     GYN       Hematology  Anemia ,    Comment: Treated for CLL Musculoskeletal  Negative musculoskeletal ROS        Neurology  Negative neurology ROS      Psychology   Negative psychology ROS            Physical Exam    Airway    Mallampati score: II  TM Distance: >3 FB  Neck ROM: full     Dental   upper dentures and lower dentures,     Cardiovascular  Rhythm: regular, Rate: normal, Cardiovascular exam normal    Pulmonary  Pulmonary exam normal Breath sounds clear to auscultation,     Other Findings        Anesthesia Plan  ASA Score- 3       Anesthesia Type- general and IV sedation with anesthesia        Induction- intravenous      Informed Consent  Anesthetic plan and risks discussed with patient

## 2017-10-05 NOTE — OP NOTE
OPERATIVE REPORT  PATIENT NAME: Cintia Viramontes    :  [id number]  MRN: [id number]  Pt Location: [facility] ROOM 01    SURGERY DATE: 10/5/2017    Surgeon(s) and Role:     * Louretta Apgar, DO - Primary  Norma Pisano, OMS-III also present for the procedure  Preop Diagnosis:  Gastro-esophageal reflux disease with esophagitis [K21 0]  Ulcer of esophagus without bleeding [K22 10]    Post-Op Diagnosis Codes:     * Esophagitis [K20 9]    Procedure(s) (LRB):  ESOPHAGOGASTRODUODENOSCOPY (EGD) with bx (N/A)    Specimen(s):  ID Type Source Tests Collected by Time Destination   1 : distal esophagus bx Tissue Esophagus TISSUE EXAM Louretta Apgar, DO 10/5/2017 1538      ESOPHAGOGASTRODUODENOSCOPY    PROCEDURE: EGD    SEDATION: Monitored anesthesia care, check anesthesia records    ASA Class: 2    INDICATIONS: Odynophagia, previous HSV esophagitis, chronic immunosuppression; history of CLL    CONSENT:  Informed consent was obtained for the procedure, including sedation after explaining the risks and benefits of the procedure  Risks including but not limited to bleeding, perforation, infection, and missed lesion  PREPARATION:   Telemetry, pulse oximetry, blood pressure were monitored throughout the procedure  Patient was identified by myself both verbally and by visual inspection of ID band  DESCRIPTION:   Patient was placed in the left lateral decubitus position and was sedated with the above medication  The gastroscope was introduced in to the oropharynx and the esophagus was intubated under direct visualization  Scope was passed down the esophagus up to 2nd part of the duodenum  A careful inspection was made as the gastroscope was withdrawn, including a retroflexed view of the stomach; findings and interventions are described below  FINDINGS:    #1  Esophagus- Severe erosive esophagitis with linear streaks of friable mucosa spanning 34-38 cm from the incisors  Prior positive for HSV    Multiple cold forceps biopsy obtained  Retrieved  4 cm hiatal hernia from 38 to 42 cm from the incisors  #2  Stomach- Normal appearing stomach in direct and retroflexed view  #3  Duodenum- Normal first and second parts of duodenum  IMPRESSIONS:    Severe erosive esophagitis with friable mucosa in distal esophagus spanning 4 cm in a circumferential fashion  Biopsied  Large hiatal hernia  Normal appearing stomach, first, and second parts of the duodenum  RECOMMENDATIONS:   Await biopsy results  Will determine treatment course based on pathology  Follow up in the office  Will start liquid carafate, magic mouthwash and PPI BID as well  COMPLICATIONS:  None; patient tolerated the procedure well            DISPOSITION: PACU           CONDITION: Stable        SIGNATURE: Manuel Sainz DO  DATE: October 5, 2017  TIME: 3:46 PM

## 2017-10-05 NOTE — DISCHARGE INSTRUCTIONS
Esophagitis   WHAT YOU NEED TO KNOW:   Esophagitis is inflammation or irritation of the lining of the esophagus  DISCHARGE INSTRUCTIONS:   Call 911 for any of the following:   · You have chest pain that does not go away within a few minutes or gets worse  Seek care immediately if:   · You feel like you have food stuck in your throat and you cannot cough it out  Contact your healthcare provider if:   · You have new or worsening symptoms, even after treatment  · You have questions or concerns about your condition or care  Medicines:   · Medicines  may be given to fight an infection or to control stomach acid  · Take your medicine as directed  Contact your healthcare provider if you think your medicine is not helping or if you have side effects  Tell him or her if you are allergic to any medicine  Keep a list of the medicines, vitamins, and herbs you take  Include the amounts, and when and why you take them  Bring the list or the pill bottles to follow-up visits  Carry your medicine list with you in case of an emergency  Follow up with your healthcare provider as directed: You may need ongoing tests or treatment  Write down your questions so you remember to ask them during your visits  Do not smoke:  Nicotine and other chemicals in cigarettes and cigars can cause blood vessel and lung damage  Ask your healthcare provider for information if you currently smoke and need help to quit  E-cigarettes or smokeless tobacco still contain nicotine  Talk to your healthcare provider before you use these products  Do not drink alcohol:  Alcohol can irritate your esophagus  Talk to your healthcare provider if you need help to stop drinking  Keep batteries and similar objects out of the reach of children:  Babies often put items in their mouths to explore them  Button batteries are easy to swallow and can cause serious damage   Keep the battery covers of electronic devices such as remote controls taped closed  Store all batteries and toxic materials where children cannot get to them  Use childproof locks to keep children away from dangerous materials  Manage or prevent esophagitis:   · Limit or do not eat foods that can lead to esophagitis  Foods such as oranges and salsa can irritate your esophagus  Caffeine and chocolate can cause acid reflux  High-fat and fried foods make your stomach digest food more slowly  This increases the amount of stomach acid your esophagus is exposed to  Eat small meals, and drink water with your meals  Soft foods such as yogurt and applesauce may help soothe your throat  Do not eat for at least 3 hours before you go to bed  · Prevent acid reflux  Do not bend over unless it is necessary  Acid may back up into your esophagus when you bend over  If possible, keep the head of your bed elevated while you sleep  This will help keep acid from backing up  Manage stress  Stress can make your symptoms worse or cause stomach acid to back up  · Drink more liquid when you take pills  Drink a full glass of water when you take your pills  Ask your healthcare provider if you can take your pills at least an hour before you go to bed  © 2017 [address] [facility] Information is for End User's use only and may not be sold, redistributed or otherwise used for commercial purposes  All illustrations and images included in CareNotes® are the copyrighted property of [employer]  or Maximilian Tavares  The above information is an  only  It is not intended as medical advice for individual conditions or treatments  Talk to your doctor, nurse or pharmacist before following any medical regimen to see if it is safe and effective for you

## 2017-10-06 ENCOUNTER — GENERIC CONVERSION - ENCOUNTER (OUTPATIENT)
Dept: OTHER | Facility: OTHER | Age: 62
End: 2017-10-06

## 2017-10-06 ENCOUNTER — HOSPITAL ENCOUNTER (OUTPATIENT)
Dept: INFUSION CENTER | Facility: CLINIC | Age: 62
Discharge: HOME | DRG: 602 | End: 2017-10-06
Payer: MEDICARE

## 2017-10-06 LAB
ANISOCYTOSIS BLD QL SMEAR: PRESENT
BASOPHILS # BLD AUTO: 0 THOUSAND/UL (ref 0–0.1)
BASOPHILS NFR MAR MANUAL: 0 % (ref 0–1)
EOSINOPHIL # BLD AUTO: 0.02 THOUSAND/UL (ref 0–0.61)
EOSINOPHIL NFR BLD MANUAL: 1 % (ref 0–6)
ERYTHROCYTE [DISTWIDTH] IN BLOOD BY AUTOMATED COUNT: 13.7 % (ref 11.6–15.1)
HCT VFR BLD AUTO: 36.8 % (ref 36.5–49.3)
HGB BLD-MCNC: 11.9 G/DL (ref 12–17)
LYMPHOCYTES # BLD AUTO: 0.62 THOUSAND/UL (ref 0.6–4.47)
LYMPHOCYTES # BLD AUTO: 39 % (ref 14–44)
MCH RBC QN AUTO: 29.1 PG (ref 26.8–34.3)
MCHC RBC AUTO-ENTMCNC: 32.2 G/DL (ref 31.4–37.4)
MCV RBC AUTO: 90 FL (ref 82–98)
METAMYELOCYTES NFR BLD MANUAL: 1 % (ref 0–1)
MONOCYTES # BLD AUTO: 0.32 THOUSAND/UL (ref 0–1.22)
MONOCYTES NFR BLD AUTO: 20 % (ref 4–12)
NEUTS BAND NFR BLD MANUAL: 1 % (ref 0–8)
NEUTS SEG # BLD: 0.62 THOUSAND/UL (ref 1.81–6.82)
NEUTS SEG NFR BLD AUTO: 38 % (ref 43–75)
PLATELET # BLD AUTO: 108 THOUSANDS/UL (ref 149–390)
PLATELET BLD QL SMEAR: ABNORMAL
PMV BLD AUTO: 7.8 FL (ref 8.9–12.7)
RBC # BLD AUTO: 4.08 MILLION/UL (ref 3.88–5.62)
TOTAL CELLS COUNTED SPEC: 100
WBC # BLD AUTO: 1.6 THOUSAND/UL (ref 4.31–10.16)
WBC NRBC COR # BLD: 1.6 THOUSAND/UL (ref 4.31–10.16)

## 2017-10-06 PROCEDURE — 85007 BL SMEAR W/DIFF WBC COUNT: CPT | Performed by: INTERNAL MEDICINE

## 2017-10-06 PROCEDURE — 85027 COMPLETE CBC AUTOMATED: CPT | Performed by: INTERNAL MEDICINE

## 2017-10-06 NOTE — PROGRESS NOTES
Blood work collected via port a cath  Port flushed per protocol  Tracy Nelson RN with Dr Tej Capps notified of critical WBC 1 6 and will call Pt with further instructions

## 2017-10-07 ENCOUNTER — APPOINTMENT (EMERGENCY)
Dept: RADIOLOGY | Facility: HOSPITAL | Age: 62
DRG: 602 | End: 2017-10-07
Payer: MEDICARE

## 2017-10-07 ENCOUNTER — APPOINTMENT (EMERGENCY)
Dept: CT IMAGING | Facility: HOSPITAL | Age: 62
DRG: 602 | End: 2017-10-07
Payer: MEDICARE

## 2017-10-07 ENCOUNTER — HOSPITAL ENCOUNTER (INPATIENT)
Facility: HOSPITAL | Age: 62
LOS: 6 days | Discharge: HOME | DRG: 602 | End: 2017-10-14
Attending: EMERGENCY MEDICINE | Admitting: INTERNAL MEDICINE
Payer: MEDICARE

## 2017-10-07 DIAGNOSIS — T45.1X5A LEUKOPENIA DUE TO ANTINEOPLASTIC CHEMOTHERAPY (HCC): ICD-10-CM

## 2017-10-07 DIAGNOSIS — D63.8 ANEMIA, CHRONIC DISEASE: ICD-10-CM

## 2017-10-07 DIAGNOSIS — R50.9 FEVER: ICD-10-CM

## 2017-10-07 DIAGNOSIS — L03.811 CELLULITIS OF HEAD OR SCALP: Primary | ICD-10-CM

## 2017-10-07 DIAGNOSIS — C91.10 CHRONIC LYMPHOCYTIC LEUKEMIA (HCC): ICD-10-CM

## 2017-10-07 DIAGNOSIS — D70.1 LEUKOPENIA DUE TO ANTINEOPLASTIC CHEMOTHERAPY (HCC): ICD-10-CM

## 2017-10-07 LAB
ALBUMIN SERPL BCP-MCNC: 3.6 G/DL (ref 3.5–5)
ALP SERPL-CCNC: 36 U/L (ref 46–116)
ALT SERPL W P-5'-P-CCNC: 21 U/L (ref 12–78)
ANION GAP SERPL CALCULATED.3IONS-SCNC: 7 MMOL/L (ref 4–13)
APTT PPP: 31 SECONDS (ref 23–35)
AST SERPL W P-5'-P-CCNC: 16 U/L (ref 5–45)
BILIRUB SERPL-MCNC: 0.65 MG/DL (ref 0.2–1)
BUN SERPL-MCNC: 23 MG/DL (ref 5–25)
CALCIUM SERPL-MCNC: 9 MG/DL (ref 8.3–10.1)
CHLORIDE SERPL-SCNC: 103 MMOL/L (ref 100–108)
CO2 SERPL-SCNC: 29 MMOL/L (ref 21–32)
CREAT SERPL-MCNC: 1.23 MG/DL (ref 0.6–1.3)
GFR SERPL CREATININE-BSD FRML MDRD: 62 ML/MIN/1.73SQ M
GLUCOSE SERPL-MCNC: 136 MG/DL (ref 65–140)
INR PPP: 1.19 (ref 0.86–1.16)
POTASSIUM SERPL-SCNC: 3.4 MMOL/L (ref 3.5–5.3)
PROT SERPL-MCNC: 5.8 G/DL (ref 6.4–8.2)
PROTHROMBIN TIME: 15.2 SECONDS (ref 12.1–14.4)
SODIUM SERPL-SCNC: 139 MMOL/L (ref 136–145)

## 2017-10-07 PROCEDURE — 96375 TX/PRO/DX INJ NEW DRUG ADDON: CPT

## 2017-10-07 PROCEDURE — 86900 BLOOD TYPING SEROLOGIC ABO: CPT | Performed by: EMERGENCY MEDICINE

## 2017-10-07 PROCEDURE — 86901 BLOOD TYPING SEROLOGIC RH(D): CPT | Performed by: EMERGENCY MEDICINE

## 2017-10-07 PROCEDURE — 85610 PROTHROMBIN TIME: CPT | Performed by: EMERGENCY MEDICINE

## 2017-10-07 PROCEDURE — 86141 C-REACTIVE PROTEIN HS: CPT | Performed by: EMERGENCY MEDICINE

## 2017-10-07 PROCEDURE — 36415 COLL VENOUS BLD VENIPUNCTURE: CPT | Performed by: EMERGENCY MEDICINE

## 2017-10-07 PROCEDURE — 80053 COMPREHEN METABOLIC PANEL: CPT | Performed by: EMERGENCY MEDICINE

## 2017-10-07 PROCEDURE — 71020 HB CHEST X-RAY 2VW FRONTAL&LATL: CPT

## 2017-10-07 PROCEDURE — 85730 THROMBOPLASTIN TIME PARTIAL: CPT | Performed by: EMERGENCY MEDICINE

## 2017-10-07 PROCEDURE — 87040 BLOOD CULTURE FOR BACTERIA: CPT | Performed by: EMERGENCY MEDICINE

## 2017-10-07 PROCEDURE — 83735 ASSAY OF MAGNESIUM: CPT | Performed by: EMERGENCY MEDICINE

## 2017-10-07 PROCEDURE — 85027 COMPLETE CBC AUTOMATED: CPT | Performed by: EMERGENCY MEDICINE

## 2017-10-07 PROCEDURE — 96365 THER/PROPH/DIAG IV INF INIT: CPT

## 2017-10-07 PROCEDURE — 86850 RBC ANTIBODY SCREEN: CPT | Performed by: EMERGENCY MEDICINE

## 2017-10-07 PROCEDURE — 85652 RBC SED RATE AUTOMATED: CPT | Performed by: EMERGENCY MEDICINE

## 2017-10-07 PROCEDURE — 70450 CT HEAD/BRAIN W/O DYE: CPT

## 2017-10-07 PROCEDURE — 86870 RBC ANTIBODY IDENTIFICATION: CPT | Performed by: EMERGENCY MEDICINE

## 2017-10-07 PROCEDURE — 85007 BL SMEAR W/DIFF WBC COUNT: CPT | Performed by: EMERGENCY MEDICINE

## 2017-10-07 PROCEDURE — 81002 URINALYSIS NONAUTO W/O SCOPE: CPT | Performed by: EMERGENCY MEDICINE

## 2017-10-07 RX ADMIN — CEFTRIAXONE SODIUM 2000 MG: 2 INJECTION, POWDER, FOR SOLUTION INTRAMUSCULAR; INTRAVENOUS at 23:57

## 2017-10-07 RX ADMIN — HYDROMORPHONE HYDROCHLORIDE 1 MG: 1 INJECTION, SOLUTION INTRAMUSCULAR; INTRAVENOUS; SUBCUTANEOUS at 23:51

## 2017-10-08 PROBLEM — L03.811 CELLULITIS OF HEAD OR SCALP: Status: ACTIVE | Noted: 2017-10-08

## 2017-10-08 PROBLEM — R50.9 FEVER: Status: ACTIVE | Noted: 2017-10-08

## 2017-10-08 LAB
ABO GROUP BLD: NORMAL
ALBUMIN SERPL BCP-MCNC: 3.3 G/DL (ref 3.5–5)
ALP SERPL-CCNC: 27 U/L (ref 46–116)
ALT SERPL W P-5'-P-CCNC: 19 U/L (ref 12–78)
ANION GAP SERPL CALCULATED.3IONS-SCNC: 7 MMOL/L (ref 4–13)
ANISOCYTOSIS BLD QL SMEAR: PRESENT
AST SERPL W P-5'-P-CCNC: 15 U/L (ref 5–45)
BASOPHILS # BLD MANUAL: 0.01 THOUSAND/UL (ref 0–0.1)
BASOPHILS NFR MAR MANUAL: 1 % (ref 0–1)
BILIRUB SERPL-MCNC: 0.72 MG/DL (ref 0.2–1)
BILIRUB UR QL STRIP: NEGATIVE
BLD GP AB SCN SERPL QL: POSITIVE
BUN SERPL-MCNC: 18 MG/DL (ref 5–25)
CALCIUM SERPL-MCNC: 8.6 MG/DL (ref 8.3–10.1)
CHLORIDE SERPL-SCNC: 105 MMOL/L (ref 100–108)
CLARITY UR: CLEAR
CO2 SERPL-SCNC: 28 MMOL/L (ref 21–32)
COLOR UR: YELLOW
COLOR, POC: NORMAL
CREAT SERPL-MCNC: 1 MG/DL (ref 0.6–1.3)
CRP SERPL HS-MCNC: 26.35 MG/L
EOSINOPHIL # BLD MANUAL: 0.06 THOUSAND/UL (ref 0–0.4)
EOSINOPHIL NFR BLD MANUAL: 4 % (ref 0–6)
ERYTHROCYTE [DISTWIDTH] IN BLOOD BY AUTOMATED COUNT: 13.3 % (ref 11.6–15.1)
ERYTHROCYTE [DISTWIDTH] IN BLOOD BY AUTOMATED COUNT: 13.5 % (ref 11.6–15.1)
ERYTHROCYTE [SEDIMENTATION RATE] IN BLOOD: 11 MM/HOUR (ref 0–10)
GFR SERPL CREATININE-BSD FRML MDRD: 80 ML/MIN/1.73SQ M
GLUCOSE SERPL-MCNC: 112 MG/DL (ref 65–140)
GLUCOSE SERPL-MCNC: 121 MG/DL (ref 65–140)
GLUCOSE SERPL-MCNC: 123 MG/DL (ref 65–140)
GLUCOSE SERPL-MCNC: 154 MG/DL (ref 65–140)
GLUCOSE SERPL-MCNC: 156 MG/DL (ref 65–140)
GLUCOSE SERPL-MCNC: 60 MG/DL (ref 65–140)
GLUCOSE UR STRIP-MCNC: NEGATIVE MG/DL
HCT VFR BLD AUTO: 30.6 % (ref 36.5–49.3)
HCT VFR BLD AUTO: 31.1 % (ref 36.5–49.3)
HGB BLD-MCNC: 10.1 G/DL (ref 12–17)
HGB BLD-MCNC: 9.7 G/DL (ref 12–17)
HGB UR QL STRIP.AUTO: NEGATIVE
KETONES UR STRIP-MCNC: NEGATIVE MG/DL
LACTATE SERPL-SCNC: 0.7 MMOL/L (ref 0.5–2)
LEUKOCYTE ESTERASE UR QL STRIP: NEGATIVE
LYMPHOCYTES # BLD AUTO: 0.43 THOUSAND/UL (ref 0.6–4.47)
LYMPHOCYTES # BLD AUTO: 31 % (ref 14–44)
MAGNESIUM SERPL-MCNC: 1.6 MG/DL (ref 1.6–2.6)
MCH RBC QN AUTO: 29.1 PG (ref 26.8–34.3)
MCH RBC QN AUTO: 29.5 PG (ref 26.8–34.3)
MCHC RBC AUTO-ENTMCNC: 31.7 G/DL (ref 31.4–37.4)
MCHC RBC AUTO-ENTMCNC: 32.5 G/DL (ref 31.4–37.4)
MCV RBC AUTO: 91 FL (ref 82–98)
MCV RBC AUTO: 92 FL (ref 82–98)
MONOCYTES # BLD AUTO: 0.41 THOUSAND/UL (ref 0–1.22)
MONOCYTES NFR BLD: 30 % (ref 4–12)
NEUTROPHILS # BLD MANUAL: 0.47 THOUSAND/UL (ref 1.85–7.62)
NEUTS BAND NFR BLD MANUAL: 10 % (ref 0–8)
NEUTS SEG NFR BLD AUTO: 24 % (ref 43–75)
NITRITE UR QL STRIP: NEGATIVE
NRBC BLD AUTO-RTO: 0 /100 WBCS
PH UR STRIP.AUTO: 6 [PH] (ref 4.5–8)
PLATELET # BLD AUTO: 93 THOUSANDS/UL (ref 149–390)
PLATELET # BLD AUTO: 95 THOUSANDS/UL (ref 149–390)
PLATELET BLD QL SMEAR: ABNORMAL
PMV BLD AUTO: 10 FL (ref 8.9–12.7)
PMV BLD AUTO: 10.5 FL (ref 8.9–12.7)
POTASSIUM SERPL-SCNC: 3.8 MMOL/L (ref 3.5–5.3)
PROT SERPL-MCNC: 5.4 G/DL (ref 6.4–8.2)
PROT UR STRIP-MCNC: NEGATIVE MG/DL
RBC # BLD AUTO: 3.33 MILLION/UL (ref 3.88–5.62)
RBC # BLD AUTO: 3.42 MILLION/UL (ref 3.88–5.62)
RH BLD: POSITIVE
SODIUM SERPL-SCNC: 140 MMOL/L (ref 136–145)
SP GR UR STRIP.AUTO: 1.01 (ref 1–1.03)
SPECIMEN EXPIRATION DATE: NORMAL
TOTAL CELLS COUNTED SPEC: 100
UROBILINOGEN UR QL STRIP.AUTO: 0.2 E.U./DL
WBC # BLD AUTO: 1.33 THOUSAND/UL (ref 4.31–10.16)
WBC # BLD AUTO: 1.38 THOUSAND/UL (ref 4.31–10.16)

## 2017-10-08 PROCEDURE — 96361 HYDRATE IV INFUSION ADD-ON: CPT

## 2017-10-08 PROCEDURE — 80053 COMPREHEN METABOLIC PANEL: CPT | Performed by: FAMILY MEDICINE

## 2017-10-08 PROCEDURE — 83605 ASSAY OF LACTIC ACID: CPT | Performed by: INTERNAL MEDICINE

## 2017-10-08 PROCEDURE — 85027 COMPLETE CBC AUTOMATED: CPT | Performed by: FAMILY MEDICINE

## 2017-10-08 PROCEDURE — 82948 REAGENT STRIP/BLOOD GLUCOSE: CPT

## 2017-10-08 PROCEDURE — 99285 EMERGENCY DEPT VISIT HI MDM: CPT

## 2017-10-08 PROCEDURE — 81003 URINALYSIS AUTO W/O SCOPE: CPT

## 2017-10-08 RX ORDER — CITALOPRAM 20 MG/1
40 TABLET ORAL DAILY
Status: DISCONTINUED | OUTPATIENT
Start: 2017-10-08 | End: 2017-10-14 | Stop reason: HOSPADM

## 2017-10-08 RX ORDER — LORAZEPAM 0.5 MG/1
0.5 TABLET ORAL EVERY 8 HOURS PRN
Status: DISCONTINUED | OUTPATIENT
Start: 2017-10-08 | End: 2017-10-14 | Stop reason: HOSPADM

## 2017-10-08 RX ORDER — INSULIN GLARGINE 100 [IU]/ML
15 INJECTION, SOLUTION SUBCUTANEOUS EVERY MORNING
Status: DISCONTINUED | OUTPATIENT
Start: 2017-10-08 | End: 2017-10-14 | Stop reason: HOSPADM

## 2017-10-08 RX ORDER — BENZONATATE 100 MG/1
200 CAPSULE ORAL 3 TIMES DAILY PRN
Status: DISCONTINUED | OUTPATIENT
Start: 2017-10-08 | End: 2017-10-14 | Stop reason: HOSPADM

## 2017-10-08 RX ORDER — ACETAMINOPHEN 325 MG/1
650 TABLET ORAL EVERY 6 HOURS PRN
Status: DISCONTINUED | OUTPATIENT
Start: 2017-10-08 | End: 2017-10-14 | Stop reason: HOSPADM

## 2017-10-08 RX ORDER — FOLIC ACID 1 MG/1
1 TABLET ORAL DAILY
Status: DISCONTINUED | OUTPATIENT
Start: 2017-10-08 | End: 2017-10-14 | Stop reason: HOSPADM

## 2017-10-08 RX ORDER — SODIUM CHLORIDE 9 MG/ML
60 INJECTION, SOLUTION INTRAVENOUS CONTINUOUS
Status: DISCONTINUED | OUTPATIENT
Start: 2017-10-08 | End: 2017-10-14 | Stop reason: HOSPADM

## 2017-10-08 RX ORDER — DOXYCYCLINE HYCLATE 100 MG/1
100 CAPSULE ORAL EVERY 12 HOURS SCHEDULED
Status: DISCONTINUED | OUTPATIENT
Start: 2017-10-08 | End: 2017-10-08

## 2017-10-08 RX ORDER — FENOFIBRATE 145 MG/1
145 TABLET, COATED ORAL DAILY
Status: DISCONTINUED | OUTPATIENT
Start: 2017-10-08 | End: 2017-10-14 | Stop reason: HOSPADM

## 2017-10-08 RX ORDER — GABAPENTIN 100 MG/1
100 CAPSULE ORAL 2 TIMES DAILY
Status: DISCONTINUED | OUTPATIENT
Start: 2017-10-08 | End: 2017-10-12

## 2017-10-08 RX ORDER — ASPIRIN 81 MG/1
81 TABLET, CHEWABLE ORAL EVERY OTHER DAY
Status: DISCONTINUED | OUTPATIENT
Start: 2017-10-08 | End: 2017-10-14 | Stop reason: HOSPADM

## 2017-10-08 RX ORDER — POTASSIUM CHLORIDE 20 MEQ/1
20 TABLET, EXTENDED RELEASE ORAL ONCE
Status: COMPLETED | OUTPATIENT
Start: 2017-10-08 | End: 2017-10-08

## 2017-10-08 RX ORDER — SUCRALFATE ORAL 1 G/10ML
500 SUSPENSION ORAL
Status: DISCONTINUED | OUTPATIENT
Start: 2017-10-08 | End: 2017-10-12

## 2017-10-08 RX ORDER — VANCOMYCIN HYDROCHLORIDE 1 G/200ML
10 INJECTION, SOLUTION INTRAVENOUS EVERY 12 HOURS
Status: DISCONTINUED | OUTPATIENT
Start: 2017-10-08 | End: 2017-10-10

## 2017-10-08 RX ORDER — PREDNISONE 20 MG/1
20 TABLET ORAL DAILY
Status: DISCONTINUED | OUTPATIENT
Start: 2017-10-08 | End: 2017-10-14 | Stop reason: HOSPADM

## 2017-10-08 RX ORDER — DOXYCYCLINE 100 MG/1
100 TABLET ORAL 2 TIMES DAILY
Status: ON HOLD | COMMUNITY
End: 2017-10-14

## 2017-10-08 RX ORDER — PANTOPRAZOLE SODIUM 40 MG/1
40 TABLET, DELAYED RELEASE ORAL
Status: DISCONTINUED | OUTPATIENT
Start: 2017-10-08 | End: 2017-10-14 | Stop reason: HOSPADM

## 2017-10-08 RX ADMIN — HYDROMORPHONE HYDROCHLORIDE 1 MG: 1 INJECTION, SOLUTION INTRAMUSCULAR; INTRAVENOUS; SUBCUTANEOUS at 20:17

## 2017-10-08 RX ADMIN — DOXYCYCLINE 100 MG: 100 CAPSULE ORAL at 08:38

## 2017-10-08 RX ADMIN — INSULIN LISPRO 4 UNITS: 100 INJECTION, SOLUTION INTRAVENOUS; SUBCUTANEOUS at 08:18

## 2017-10-08 RX ADMIN — SODIUM CHLORIDE 60 ML/HR: 0.9 INJECTION, SOLUTION INTRAVENOUS at 11:06

## 2017-10-08 RX ADMIN — LIDOCAINE HYDROCHLORIDE 10 ML: 20 SOLUTION ORAL; TOPICAL at 22:20

## 2017-10-08 RX ADMIN — INSULIN GLARGINE 15 UNITS: 100 INJECTION, SOLUTION SUBCUTANEOUS at 08:40

## 2017-10-08 RX ADMIN — GABAPENTIN 100 MG: 100 CAPSULE ORAL at 18:30

## 2017-10-08 RX ADMIN — INSULIN LISPRO 12 UNITS: 100 INJECTION, SOLUTION INTRAVENOUS; SUBCUTANEOUS at 08:18

## 2017-10-08 RX ADMIN — FENOFIBRATE 145 MG: 145 TABLET ORAL at 08:38

## 2017-10-08 RX ADMIN — LIDOCAINE HYDROCHLORIDE 10 ML: 20 SOLUTION ORAL; TOPICAL at 07:43

## 2017-10-08 RX ADMIN — SODIUM CHLORIDE 1000 ML: 0.9 INJECTION, SOLUTION INTRAVENOUS at 00:41

## 2017-10-08 RX ADMIN — HYDROMORPHONE HYDROCHLORIDE 1 MG: 1 INJECTION, SOLUTION INTRAMUSCULAR; INTRAVENOUS; SUBCUTANEOUS at 16:28

## 2017-10-08 RX ADMIN — PANTOPRAZOLE SODIUM 40 MG: 40 TABLET, DELAYED RELEASE ORAL at 16:27

## 2017-10-08 RX ADMIN — ACETAMINOPHEN 650 MG: 325 TABLET, FILM COATED ORAL at 07:23

## 2017-10-08 RX ADMIN — ASPIRIN 81 MG 81 MG: 81 TABLET ORAL at 08:38

## 2017-10-08 RX ADMIN — LIDOCAINE HYDROCHLORIDE 10 ML: 20 SOLUTION ORAL; TOPICAL at 16:42

## 2017-10-08 RX ADMIN — SUCRALFATE 500 MG: 1 SUSPENSION ORAL at 22:19

## 2017-10-08 RX ADMIN — SUCRALFATE 500 MG: 1 SUSPENSION ORAL at 12:23

## 2017-10-08 RX ADMIN — HYDROMORPHONE HYDROCHLORIDE 1 MG: 1 INJECTION, SOLUTION INTRAMUSCULAR; INTRAVENOUS; SUBCUTANEOUS at 10:45

## 2017-10-08 RX ADMIN — INSULIN LISPRO 12 UNITS: 100 INJECTION, SOLUTION INTRAVENOUS; SUBCUTANEOUS at 18:30

## 2017-10-08 RX ADMIN — POTASSIUM CHLORIDE 20 MEQ: 1500 TABLET, EXTENDED RELEASE ORAL at 05:56

## 2017-10-08 RX ADMIN — GABAPENTIN 100 MG: 100 CAPSULE ORAL at 08:38

## 2017-10-08 RX ADMIN — INSULIN LISPRO 12 UNITS: 100 INJECTION, SOLUTION INTRAVENOUS; SUBCUTANEOUS at 12:23

## 2017-10-08 RX ADMIN — SUCRALFATE 500 MG: 1 SUSPENSION ORAL at 16:27

## 2017-10-08 RX ADMIN — FOLIC ACID 1 MG: 1 TABLET ORAL at 08:38

## 2017-10-08 RX ADMIN — LIDOCAINE HYDROCHLORIDE 10 ML: 20 SOLUTION ORAL; TOPICAL at 12:25

## 2017-10-08 RX ADMIN — CITALOPRAM HYDROBROMIDE 40 MG: 20 TABLET ORAL at 08:38

## 2017-10-08 RX ADMIN — PANTOPRAZOLE SODIUM 40 MG: 40 TABLET, DELAYED RELEASE ORAL at 06:38

## 2017-10-08 RX ADMIN — VANCOMYCIN HYDROCHLORIDE 1000 MG: 1 INJECTION, SOLUTION INTRAVENOUS at 18:30

## 2017-10-08 RX ADMIN — HYDROMORPHONE HYDROCHLORIDE 1 MG: 1 INJECTION, SOLUTION INTRAMUSCULAR; INTRAVENOUS; SUBCUTANEOUS at 03:12

## 2017-10-08 RX ADMIN — VANCOMYCIN HYDROCHLORIDE 1500 MG: 1 INJECTION, POWDER, LYOPHILIZED, FOR SOLUTION INTRAVENOUS at 06:17

## 2017-10-08 RX ADMIN — PREDNISONE 20 MG: 20 TABLET ORAL at 08:39

## 2017-10-08 RX ADMIN — SUCRALFATE 500 MG: 1 SUSPENSION ORAL at 06:38

## 2017-10-08 NOTE — ED PROVIDER NOTES
History  Chief Complaint   Patient presents with    Fever - 9 weeks to 74 years     patient transported via EMS from home; patient is a CLL patient; patient is complaining of a fever and head pain; patient noted to have a small area of cellulitis with small drainage on top of head; patient stated that it might be the cause of the fever      Patient is a 50-year-old male with a history of CLL  He presents today for a persistent fever at home  He states that it got up to 102 6 at home  He states that he has been having an issue with head pain for several weeks  He states that he feels that there is a mobile mass on the top of his head  He states that it started off as a pimple a few weeks ago  He was seen in our ER and discharged home for follow-up  The patient states that there is no redness or skin changes but has extreme amount of tenderness to the area  He also complains of a slight cough today  History provided by:  Patient   used: No    Fever Immunocompromised   Max temp prior to arrival:  102 6  Temp source:  Oral  Severity:  Moderate  Onset quality:  Gradual  Timing:  Constant  Progression:  Unchanged  Chronicity:  New  Relieved by:  Acetaminophen and ibuprofen  Associated symptoms: chills, cough, headaches and nausea    Associated symptoms: no chest pain, no congestion, no diarrhea, no dysuria, no rash, no sore throat and no vomiting    Risk factors: hx of cancer and immunosuppression        Prior to Admission Medications   Prescriptions Last Dose Informant Patient Reported? Taking?    Alcohol Swabs PADS   No No   Sig: by Does not apply route 4 (four) times a day (before meals and at bedtime) E11 9   Ibrutinib 140 MG CAPS   Yes Yes   Sig: Take 280 mg by mouth daily   LORazepam (ATIVAN) 0 5 mg tablet   No No   Sig: Take 1 tablet by mouth every 8 (eight) hours as needed for anxiety for up to 10 days   Lancets (FREESTYLE) lancets   No No   Sig: Use as instructed--test 2 times a day    E 11 9   al mag oxide-diphenhydramine-lidocaine viscous (MAGIC MOUTHWASH)   No No   Sig: Take 10 mL by mouth 4 (four) times a day (before meals and at bedtime) for 30 days   aspirin 81 MG tablet   Yes Yes   Sig: Take 81 mg by mouth every other day Every other day    benzonatate (TESSALON) 200 MG capsule   Yes No   Sig: Take 200 mg by mouth 3 (three) times a day as needed for cough   citalopram (CeleXA) 40 mg tablet   Yes Yes   Sig: Take 40 mg by mouth daily   doxycycline (ADOXA) 100 MG tablet   Yes Yes   Sig: Take 100 mg by mouth 2 (two) times a day   fenofibrate (TRIGLIDE) 50 MG tablet   Yes Yes   Sig: Take 134 mg by mouth daily     folic acid (FOLVITE) 1 mg tablet   Yes Yes   Sig: Take 1 mg by mouth daily   gabapentin (NEURONTIN) 100 mg capsule   Yes Yes   Sig: Take 100 mg by mouth 2 (two) times a day     glucose monitoring kit (FREESTYLE) monitoring kit   No No   Si each by Does not apply route as needed ( ) E 11 9   insulin glargine (LANTUS) 100 units/mL subcutaneous injection   No Yes   Sig: Inject 15 Units under the skin every morning   insulin lispro (HumaLOG) 100 units/mL injection   No Yes   Sig: Inject 12 Units under the skin 3 (three) times a day with meals   multivitamin (THERAGRAN) TABS   Yes No   Sig: Take 1 tablet by mouth daily   obinutuzumab (GAZYVA) 1000 mg/40 mL SOLN   Yes Yes   Sig: Infuse into a venous catheter   ondansetron (ZOFRAN) 8 mg tablet   Yes No   Sig: Take by mouth every 8 (eight) hours as needed for nausea or vomiting   pantoprazole (PROTONIX) 40 mg tablet   Yes Yes   Sig: Take 40 mg by mouth 2 (two) times a day     predniSONE 20 mg tablet   No Yes   Sig: Take 1 tablet by mouth daily   Patient taking differently: Take 40 mg by mouth daily     sucralfate (CARAFATE) 1 g/10 mL suspension   No No   Sig: Take 10 mL by mouth 4 (four) times a day      Facility-Administered Medications: None       Past Medical History:   Diagnosis Date    CAD (coronary artery disease)     CKD (chronic kidney disease) stage 3, GFR 30-59 ml/min     CLL (chronic lymphocytic leukemia) (HCC)     [facility]ch[facility] [address] [facility]     Diabetes mellitus [facility] [address] [facility]     H/O blood clots     Hemolytic anemia (HCC)     History of TIA (transient ischemic attack)     Hyperlipidemia     Hypertension     Multiple gastric ulcers     Myocardial infarction     Recurrent sinusitis     Thrombocytopenia (HCC)     Vertigo        Past Surgical History:   Procedure Laterality Date    ARTHROSCOPIC REPAIR ACL      lt knee    PORTACATH PLACEMENT      NE ESOPHAGOGASTRODUODENOSCOPY TRANSORAL DIAGNOSTIC N/A 10/5/2017    Procedure: ESOPHAGOGASTRODUODENOSCOPY (EGD) with bx;  Surgeon: Janeth Snow DO;  Location: [facility]; Service: Gastroenterology    NE ESOPHAGOSCOPY FLEXIBLE TRANSORAL WITH BIOPSY N/A 9/2/2016    Procedure: ESOPHAGOGASTRODUODENOSCOPY (EGD); ESOPHAGEAL DILATION; ESOPHAGEAL BIOPSY;  Surgeon: Edyta Nair MD;  Location: [facility];  Service: Thoracic    TONSILLECTOMY      UPPER GASTROINTESTINAL ENDOSCOPY      x 6       History reviewed  No pertinent family history  I have reviewed and agree with the history as documented  Social History   Substance Use Topics    Smoking status: Former Smoker    Smokeless tobacco: Never Used      Comment: pt refuses to answer question    Alcohol use Yes      Comment: Occasional use        Review of Systems   Constitutional: Positive for chills, diaphoresis and fever  HENT: Negative for congestion and sore throat  Respiratory: Positive for cough  Negative for chest tightness and shortness of breath  Cardiovascular: Negative for chest pain  Gastrointestinal: Positive for nausea  Negative for abdominal pain, diarrhea and vomiting  Genitourinary: Negative for dysuria  Musculoskeletal: Negative for arthralgias  Skin: Positive for wound  Negative for color change, pallor and rash     Allergic/Immunologic: Positive for immunocompromised state  Neurological: Positive for headaches  Hematological: Negative for adenopathy  All other systems reviewed and are negative  Physical Exam  ED Triage Vitals [10/07/17 2218]   Temperature Pulse Respirations Blood Pressure SpO2   100 4 °F (38 °C) 105 18 137/69 97 %      Temp Source Heart Rate Source Patient Position - Orthostatic VS BP Location FiO2 (%)   Oral Monitor Lying Right arm --      Pain Score       7           Physical Exam   Constitutional: He is oriented to person, place, and time  He appears well-developed and well-nourished  HENT:   Head: Normocephalic and atraumatic  Mouth/Throat: Oropharynx is clear and moist    Eyes: Conjunctivae are normal  Pupils are equal, round, and reactive to light  Neck: Normal range of motion  Neck supple  Cardiovascular: Normal rate, regular rhythm, normal heart sounds and intact distal pulses  Exam reveals no friction rub  No murmur heard  Pulmonary/Chest: Effort normal and breath sounds normal  No respiratory distress  He has no wheezes  He has no rales  Abdominal: Soft  He exhibits no distension  There is no tenderness  There is no rebound and no guarding  Musculoskeletal: Normal range of motion  He exhibits no edema, tenderness or deformity  Neurological: He is alert and oriented to person, place, and time  Skin: Skin is warm and dry  Psychiatric: He has a normal mood and affect  Nursing note and vitals reviewed  ED Medications  Medications   sodium chloride 0 9 % bolus 1,000 mL (1,000 mL Intravenous New Bag 10/8/17 0041)   HYDROmorphone (DILAUDID) 1 mg/mL injection 1 mg (1 mg Intravenous Given 10/7/17 2351)   ceftriaxone (ROCEPHIN) 2 g/50 mL in dextrose IVPB (0 mg Intravenous Stopped 10/8/17 0038)       Diagnostic Studies  Labs Reviewed   CBC AND DIFFERENTIAL - Abnormal        Result Value Ref Range Status    WBC 1 38 (*) 4 31 - 10 16 Thousand/uL Final    Comment:  This result has been called to [facility] by Elvira Locke on 10 08 2017 at [address] , and has been read back  RBC 3 42 (*) 3 88 - 5 62 Million/uL Final    Hemoglobin 10 1 (*) 12 0 - 17 0 g/dL Final    Hematocrit 31 1 (*) 36 5 - 49 3 % Final    Platelets 93 (*) 999 - 390 Thousands/uL Final    MCV 91  82 - 98 fL Final    MCH 29 5  26 8 - 34 3 pg Final    MCHC 32 5  31 4 - 37 4 g/dL Final    RDW 13 3  11 6 - 15 1 % Final    MPV 10 0  8 9 - 12 7 fL Final    nRBC 0  /100 WBCs Final   PROTIME-INR - Abnormal     Protime 15 2 (*) 12 1 - 14 4 seconds Final    INR 1 19 (*) 0 86 - 1 16 Final   COMPREHENSIVE METABOLIC PANEL - Abnormal     Potassium 3 4 (*) 3 5 - 5 3 mmol/L Final    Alkaline Phosphatase 36 (*) 46 - 116 U/L Final    Total Protein 5 8 (*) 6 4 - 8 2 g/dL Final    Sodium 139  136 - 145 mmol/L Final    Chloride 103  100 - 108 mmol/L Final    CO2 29  21 - 32 mmol/L Final    Anion Gap 7  4 - 13 mmol/L Final    BUN 23  5 - 25 mg/dL Final    Creatinine 1 23  0 60 - 1 30 mg/dL Final    Comment: Standardized to IDMS reference method    Glucose 136  65 - 140 mg/dL Final    Comment:   If the patient is fasting, the ADA then defines impaired fasting glucose as > 100 mg/dL and diabetes as > or equal to 123 mg/dL  Specimen collection should occur prior to Sulfasalazine administration due to the potential for falsely depressed results  Specimen collection should occur prior to Sulfapyridine administration due to the potential for falsely elevated results  Calcium 9 0  8 3 - 10 1 mg/dL Final    AST 16  5 - 45 U/L Final    Comment:   Specimen collection should occur prior to Sulfasalazine administration due to the potential for falsely depressed results  ALT 21  12 - 78 U/L Final    Comment:   Specimen collection should occur prior to Sulfasalazine administration due to the potential for falsely depressed results       Albumin 3 6  3 5 - 5 0 g/dL Final    Total Bilirubin 0 65  0 20 - 1 00 mg/dL Final    eGFR 62  ml/min/1 73sq m Final    Narrative:     [facility] Education [facility] recommendations are as follows:  GFR calculation is accurate only with a steady state creatinine  Chronic Kidney disease less than 60 ml/min/1 73 sq  meters  Kidney failure less than 15 ml/min/1 73 sq  meters  SEDIMENTATION RATE - Abnormal     Sed Rate 11 (*) 0 - 10 mm/hour Final   MANUAL DIFFERENTIAL(PHLEBS DO NOT ORDER) - Abnormal     Segmented % 24 (*) 43 - 75 % Final    Bands % 10 (*) 0 - 8 % Final    Monocytes % 30 (*) 4 - 12 % Final    Absolute Neutrophils 0 47 (*) 1 85 - 7 62 Thousand/uL Final    Lymphocytes Absolute 0 43 (*) 0 60 - 4 47 Thousand/uL Final    Platelet Estimate Decreased (*) Adequate Final    Lymphocytes % 31  14 - 44 % Final    Eosinophils % 4  0 - 6 % Final    Basophils % 1  0 - 1 % Final    Monocytes Absolute 0 41  0 00 - 1 22 Thousand/uL Final    Eosinophils Absolute 0 06  0 00 - 0 40 Thousand/uL Final    Basophils Absolute 0 01  0 00 - 0 10 Thousand/uL Final    Total Counted 100   Final    Anisocytosis Present   Final   APTT - Normal    PTT 31  23 - 35 seconds Final    Narrative:      Therapeutic Heparin Range = 60-90 seconds   HIGH SENSITIVITY C-REACTIVE PROTEIN - Normal    CRP, High Sensitivity 26 35  mg/L Final    Narrative:           HsCRP Level       Relative Risk           <1 0 mg/L          Low           1 0 to 3 0 mg/L    Average           >3 0 mg/L          High     MAGNESIUM - Normal    Magnesium 1 6  1 6 - 2 6 mg/dL Final   POCT URINALYSIS DIPSTICK - Normal    Color, UA yellow & clear   Final   ED URINE MACROSCOPIC - Normal    Color, UA Yellow   Final    Clarity, UA Clear   Final    pH, UA 6 0  4 5 - 8 0 Final    Leukocytes, UA Negative  Negative Final    Nitrite, UA Negative  Negative Final    Protein, UA Negative  Negative mg/dl Final    Glucose, UA Negative  Negative mg/dl Final    Ketones, UA Negative  Negative mg/dl Final    Urobilinogen, UA 0 2  0 2, 1 0 E U /dl E U /dl Final    Bilirubin, UA Negative  Negative Final    Blood, UA Negative Negative Final    Specific [place], UA 1 010  1 003 - 1 030 Final    Narrative:     CLINITEK RESULT   BLOOD CULTURE   BLOOD CULTURE   TYPE AND SCREEN       XR chest 2 views   ED Interpretation   NAD      CT head without contrast   ED Interpretation   IMPRESSION:   1  Mild paranasal sinusitis  2  Focal skin thickening and subcutaneous inflammatory stranding   noted within the scalp near the   vertex and more posteriorly  No focal drainable fluid collection   identified by CT  Findings are best   seen on coronal images  This is compatible with the reported   history of cellulitis  Procedures  Procedures      Phone Contacts  ED Phone Contact    ED Course  ED Course                                MDM  Number of Diagnoses or Management Options  Diagnosis management comments: Patient appears well on exam   His temperature is trending down compared to what he had at home  He is 100 4 here  Patient is complaining of a lot of head pain to the vertex of his skull  There is a slightly mobile mass but no overlying skin changes  The patient reported a pimple there few weeks ago but nothing is there now  There is no drainage  Plan is to check labs, blood cultures, give IV fluids and pain control  Will obtain a chest x-ray and a CT of his head  Given his cancer and underlying immunosuppression and fever he will need to be admitted for IV antibiotics  12:26 AM  CT does show cellulitis of the scalp  No drainable abscess    Will admit to the hospital        Amount and/or Complexity of Data Reviewed  Clinical lab tests: ordered and reviewed  Tests in the radiology section of CPT®: ordered and reviewed  Tests in the medicine section of CPT®: reviewed and ordered  Review and summarize past medical records: yes      CritCare Time    Disposition  Final diagnoses:   Cellulitis of head or scalp   Fever   Leukopenia due to antineoplastic chemotherapy (HCC)   Chronic lymphocytic leukemia (HCC)   Anemia, chronic disease ED Disposition     ED Disposition Condition Comment    Admit  Case was discussed with MARIELA and the patient's admission status was agreed to be Admission Status: inpatient status to the service of Dr Izabella Bai   Follow-up Information    None       Patient's Medications   Discharge Prescriptions    No medications on file     No discharge procedures on file      ED Provider  Electronically Signed by       Deric Beverly DO  10/08/17 5884

## 2017-10-08 NOTE — CASE MANAGEMENT
Initial Clinical Review    Admission: Date/Time/Statement: 10/8/17 @ 0141     Orders Placed This Encounter   Procedures    Inpatient Admission (expected length of stay for this patient is greater than two midnights)     Standing Status:   Standing     Number of Occurrences:   1     Order Specific Question:   Admitting Physician     Answer:   Michael Grover [[id number]]     Order Specific Question:   Level of Care     Answer:   Med Surg [16]     Order Specific Question:   Estimated length of stay     Answer:   More than 2 Midnights     Order Specific Question:   Certification     Answer:   I certify that inpatient services are medically necessary for this patient for a duration of greater than two midnights  See H&P and MD Progress Notes for additional information about the patient's course of treatment  ED: Date/Time/Mode of Arrival:   ED Arrival Information     Expected Arrival Acuity Means of Arrival Escorted By Service Admission Type    - 10/7/2017 22:11 [facility] [address] [place] [address] Emergency    Arrival Complaint    Fever          Chief Complaint:   Chief Complaint   Patient presents with    Fever - 9 weeks to 74 years     patient transported via EMS from home; patient is a CLL patient; patient is complaining of a fever and head pain; patient noted to have a small area of cellulitis with small drainage on top of head; patient stated that it might be the cause of the fever        History of Illness:    Katelyn Mcarthur is a 60 y o  male who presents with above chief complaints  Patient states he has had a swelling at the crown of his head for about 2 months  It started off as a pimple which she tried to press on  The swelling then spread towards the back of his head  He states the swelling was functuant  Associated redness, some warmth  No known relieving or aggravating factor  He has also been having severe pain at the site of the swelling    He took acetaminophen and ibuprofen for this had pain with attending relieved  He was seen emergency room for this problem and was given some antibiotics however the symptoms persisted  Last night he started having fever  Temperature at home was as high as 102 7  No chest pain, no shortness of breath, no cough, no palpitations, no urinary symptoms  In the ER, CT of the head without contrast shows focal skin thickening and subcutaneous inflammatory stranding noted within the scalp near the vertex and more posteriorly  No focal drainable fluid collection identified by CT  Compatible with cellulitis  ED Vital Signs:   ED Triage Vitals [10/07/17 2218]   Temperature Pulse Respirations Blood Pressure SpO2   100 4 °F (38 °C) 105 18 137/69 97 %      Temp Source Heart Rate Source Patient Position - Orthostatic VS BP Location FiO2 (%)   Oral Monitor Lying Right arm --      Pain Score       7        Wt Readings from Last 1 Encounters:   10/08/17 93 9 kg (207 lb 0 2 oz)       Vital Signs (abnormal):    above    Abnormal Labs/Diagnostic Test Results:    ESR   11  WBC  1 38  H/H  10 1/31 1  RBC   3 42  K  3 4  Alk phos    36  PT   15 2             INR   1 19  CXR:  NAD  Ct  Head:   No acute intracranial abnormality    ED Treatment:   Medication Administration from 10/07/2017 2211 to 10/08/2017 0236       Date/Time Order Dose Route Action Action by Comments     10/08/2017 0041 sodium chloride 0 9 % bolus 1,000 mL 1,000 mL Intravenous [address] Adán Flanagan RN      10/07/2017 2351 HYDROmorphone (DILAUDID) 1 mg/mL injection 1 mg 1 mg Intravenous Given Adán Flanagan RN      10/08/2017 0038 ceftriaxone (ROCEPHIN) 2 g/50 mL in dextrose IVPB 0 mg Intravenous Stopped Adán Flanagan RN      10/07/2017 2357 ceftriaxone (ROCEPHIN) 2 g/50 mL in dextrose IVPB 2,000 mg Intravenous New Bag Adán Flanagan RN           Past Medical/Surgical History:    Active Ambulatory Problems     Diagnosis Date Noted    CAD (coronary artery disease) 01/01/2004    C[place] (ch[place] lymphocytic [place]) ([place] [address] )     Hyperlipidemia     Hypertension     History of TIA (transient ischemic attack)     Gastroesophageal reflux disease with esophagitis 01/18/2017    Candida esophagitis ([place] [address] ) 01/18/2017    CKD (chronic kidney disease) stage 3, GFR 30-59 ml/min     H/O blood clots     Hemolytic anemia (HCC) 03/06/2017    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[facility]) 03/06/2017    Anemia, chronic disease 03/10/2017    Chronic lymphocytic leukemia ([place] [address] ) 03/20/2017    Leukopenia due to antineoplastic chemotherapy ([place] [address] ) 09/18/2017    Hyperglycemia 09/18/2017     Resolved Ambulatory Problems     Diagnosis Date Noted    GI bleed 03/10/2017     Past Medical History:   Diagnosis Date    CAD (coronary artery disease) 2004    CKD (chronic kidney disease) stage 3, GFR 30-59 ml/min     C[place] (ch[place] lymphocytic [place]) ([place] [address] )     COPD (chronic obstructive pulmonary disease) ([place] [address] )     Diabetes mellitus ([place] [address] )     H/O blood clots     Hemolytic anemia (HCC)     History of TIA (transient ischemic attack)     Hyperlipidemia     Hypertension     Multiple gastric ulcers     Myocardial infarction     Recurrent sinusitis     Thrombocytopenia (HCC)     Vertigo        Admitting Diagnosis: Chronic lymphocytic leukemia (HCC) [C91 10]  Fever [R50 9]  Anemia, chronic disease [D63 8]  Cellulitis of head or scalp [L03 811]  Leukopenia due to antineoplastic chemotherapy ([place] [address] ) [D70 1, T45 1X5A]    Age/Sex: 60 y o  male    Assessment/Plan:    Fever/scalp cellulitis /headache  Patient given ceftriaxone in the emergency room  Will start patient on vancomycin  Follow blood cultures  Tylenol as needed for fever     Leukopenia  Chronic leukopenia due to CLL  Will place patient on neutropenic precautions     Anemia  Chronic but stable     Thrombocytopenia  No evidence of bleed  Monitor platelet count     Hypokalemia  Replete as needed     History of CLL  On oral chemotherapy/steroid  Next dose of chemo in 2 weeks per patient  Patient is pancytopenic  Monitor CBC  Diabetes mellitus  Continue insulin glargine  Will start patient on sliding scale insulin     Hyperlipidemia  Continue home medication     Depression and anxiety  Continue home medications     GERD  Continue home medication        VTE Prophylaxis: Pharmacologic VTE Prophylaxis contraindicated due to Allergy to heparin  / sequential compression device   Code Status:  Full code  POLST: POLST form is not discussed and not completed at this time      Anticipated Length of Stay:  Patient will be admitted on an Inpatient basis with an anticipated length of stay of  > 2 midnights     Justification for Hospital Stay:  IV antibiotics    Admission Orders:   IP    10/8  @    0141  Scheduled Meds:   emanuel Soria oxide-diphenhydramine-lidocaine viscous 10 mL Oral 4x Daily (AC & HS)   aspirin 81 mg Oral Every Other Day   citalopram 40 mg Oral Daily   fenofibrate 145 mg Oral Daily   folic acid 1 mg Oral Daily   gabapentin 100 mg Oral BID   insulin glargine 15 Units Subcutaneous QAM   insulin lispro 1-6 Units Subcutaneous HS   insulin lispro 12 Units Subcutaneous TID With Meals   insulin lispro 4-20 Units Subcutaneous TID AC   pantoprazole 40 mg Oral BID AC   predniSONE 20 mg Oral Daily   sucralfate 500 mg Oral 4x Daily (AC & HS)   vancomycin 10 mg/kg Intravenous Q12H     Continuous Infusions:   sodium chloride 60 mL/hr Last Rate: 60 mL/hr (10/08/17 1106)     PRN Meds:   acetaminophen    benzonatate    LORazepam     CCD diet  Neutropenic precautions  Cons  ID      PRIOR  ADM  Admission Date: 9/18/2017       Discharge Date:  09/20/2017     Admitting Diagnosis:  Uncontrolled hyperglycemia  Steroid induced diabetes     Primary Discharge Diagnosis  Principal Problem:   Steroid induced diabetes  Active Problems:    CAD (coronary artery disease)    Hyperlipidemia    Hypertension    History of TIA (transient ischemic attack)    Gastroesophageal reflux disease with esophagitis    CKD (chronic kidney disease) stage 3, GFR 30-59 ml/min    H/O blood clots    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)    Anemia, chronic disease    Chronic lymphocytic leukemia    Leukopenia due to antineoplastic chemotherapy        Service:  [facility]     Consulting Providers   Oncology  [address] [facility] Course by Problem  1-steroid induced diabetes-  Nonfasting blood glucose on admission was 623-this has now trended down to around 100  Yesy Hyatt prior history of DM  2    patient was on an insulin drip initially but this has now been switched to a basal Lantus 15 units and NovoLog 8 units t i d  with meals   Patient received insulin injection teaching today as well as diabetes education   Has a steroid doses reduce he will also need reduction and his insulin dosage  Daniel Arthur will follow up with Endocrinology as an outpatient for this       3   CLL with autoimmune hemolytic anemia:  Currently receiving IV infusion every Wednesday and Ibrutinib daily   Dose reduced to 280 mg a day     Steroid dose reduced to 20 mg a day    Known to oncologist Dr Dipti Grace       4   Leukopenia with neutropenia:  WBC 1 49, absolute neutrophil count 0 66       5   History of CVA:  Continue fenofibrate and aspirin     6   Anxiety:  P r n  Ativan; patient 9th SI or HI     7   Chronic cancer-related pain:  Continue gabapentin     8   History of DVT:  No anticoagulation due to thrombocytopenia     9   Anemia of chronic disease:  Secondary to CLL and chemotherapy     Stable for discharge  Will give scripts for insulin, testing supplies and glucometer     Discharge Condition: Improved     Discharge Disposition: Home/Self Care     Discharge summary:      Hermes Tenorio is an extremely pleasant 69-year-old male with a history of CLL with autoimmune hemolytic anemia diagnosed in 2001--currently on Ibrutinib , Gazyva and prednisone  Patient was found to have blood sugar of more than 500 on routine evaluation    He admits to feeling tired and having blurred vision over the past few weeks  No evidence of any ketoacidosis  He was initially started on an insulin drip and was subsequently transitioned to a basal Lantus dose of 15 units daily and Humalog 12 units 3 times a day before meals-blood sugars of now trended down and between 120 and 150  Glycosylated hemoglobin is 7 3  He was on prednisone 40 mg daily which has now been reduced to 20 mg a day  His ibrutinib has been reduced to 280 mg a day as well  The patient has been taught insulin injection techniques which he has mustard well in addition he has also been indicated with regards to a diabetic diet  As his steroid dose is decreased it is expected that his need for insulin will decrease as well  He is to follow up with Endocrinology as an outpatient he will also follow up with his oncologist Benji Jang as an outpatient  He remains stable for discharge   Scripts for testing supplies, insulin have been provided to the patient on discharge         Discharge Medications   Please see Medical Reconciliation Discharge Form     Discharge Follow Up Appointments:   PCP Dena Bazan-Dr Cande Maki     Discharge  Statement   Total Time Spent today including physical exam, discussion with patient and family, and discharge arrangements/care 42 minutes

## 2017-10-08 NOTE — H&P
History and Physical - [facility]    Patient Information: Yuridia Garcia 60 y o  male MRN: [id number]  Unit/Bed#: E2 -01 Encounter: [id number]  Admitting Physician: Antolin Spencer MD  PCP: Jenniffer Aldridge MD  Date of Admission:  10/08/17    Assessment/Plan:    Hospital Problem List:     Principal Problem:    Cellulitis of head or scalp  Active Problems:    CAD (coronary artery disease)    CLL (chronic lymphocytic leukemia) ([facility] [address] )    Hyperlipidemia    Hypertension    History of TIA (transient ischemic attack)    Gastroesophageal reflux disease with esophagitis    Candida esophagitis (HCC)    CKD (chronic kidney disease) stage 3, GFR 30-59 ml/min    H/O blood clots    Hemolytic anemia (HCC)    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[facility])    Anemia, chronic disease    Chronic lymphocytic leukemia (HCC)    Fever    Fever/scalp cellulitis /headache  Patient given ceftriaxone in the emergency room  Will start patient on vancomycin  Follow blood cultures  Tylenol as needed for fever    Leukopenia  Chronic leukopenia due to CLL  Will place patient on neutropenic precautions    Anemia  Chronic but stable    Thrombocytopenia  No evidence of bleed  Monitor platelet count    Hypokalemia  Replete as needed    History of CLL  On oral chemotherapy/steroid  Next dose of chemo in 2 weeks per patient  Patient is pancytopenic  Monitor CBC    Diabetes mellitus  Continue insulin glargine  Will start patient on sliding scale insulin    Hyperlipidemia  Continue home medication    Depression and anxiety  Continue home medications    GERD  Continue home medication      VTE Prophylaxis: Pharmacologic VTE Prophylaxis contraindicated due to Allergy to heparin  / sequential compression device   Code Status:  Full code  POLST: POLST form is not discussed and not completed at this time  Anticipated Length of Stay:  Patient will be admitted on an Inpatient basis with an anticipated length of stay of  > 2 midnights  Justification for Hospital Stay:  IV antibiotics    Total Time for Visit, including Counseling / Coordination of Care: 1 hour  Greater than 50% of this total time spent on direct patient counseling and coordination of care  Chief Complaint:   Fever, pain, swelling with redness and pain on the crown of the head    History of Present Illness:    Mu Chacon is a 60 y o  male who presents with above chief complaints  Patient states he has had a swelling at the crown of his head for about 2 months  It started off as a pimple which she tried to press on  The swelling then spread towards the back of his head  He states the swelling was functuant  Associated redness, some warmth  No known relieving or aggravating factor  He has also been having severe pain at the site of the swelling  He took acetaminophen and ibuprofen for this had pain with attending relieved  He was seen emergency room for this problem and was given some antibiotics however the symptoms persisted  Last night he started having fever  Temperature at home was as high as 102 7  No chest pain, no shortness of breath, no cough, no palpitations, no urinary symptoms  In the ER, CT of the head without contrast shows focal skin thickening and subcutaneous inflammatory stranding noted within the scalp near the vertex and more posteriorly  No focal drainable fluid collection identified by CT  Compatible with cellulitis  Review of Systems:    Review of Systems   Constitutional: Positive for fever  Respiratory: Negative for cough and shortness of breath  Gastrointestinal: Positive for nausea  Genitourinary: Negative for dysuria  Skin: Positive for color change  Scalp Swelling at the top of the head   Neurological: Positive for headaches  All other systems reviewed and are negative        Past Medical and Surgical History:     Past Medical History:   Diagnosis Date    CAD (coronary artery disease) 2004    CKD (chronic kidney disease) stage 3, GFR 30-59 ml/min     CLL (chronic lymphocytic leukemia) (HCC)     [facility]) [facility] [address] )     Diabetes mellitus ([facility] [address] )     H/O blood clots     Hemolytic anemia (HCC)     History of TIA (transient ischemic attack)     Hyperlipidemia     Hypertension     Multiple gastric ulcers     Myocardial infarction     Recurrent sinusitis     Thrombocytopenia (HCC)     Vertigo        Past Surgical History:   Procedure Laterality Date    ARTHROSCOPIC REPAIR ACL      lt knee    PORTACATH PLACEMENT      GA ESOPHAGOGASTRODUODENOSCOPY TRANSORAL DIAGNOSTIC N/A 10/5/2017    Procedure: ESOPHAGOGASTRODUODENOSCOPY (EGD) with bx;  Surgeon: Merlyn Pineda DO;  [facility]; Service: Gastroenterology    GA ESOPHAGOSCOPY FLEXIBLE TRANSORAL WITH BIOPSY N/A 9/2/2016    Procedure: ESOPHAGOGASTRODUODENOSCOPY (EGD); ESOPHAGEAL DILATION; ESOPHAGEAL BIOPSY;  Surgeon: Pete Perrin MD;  [facility];  Service: Thoracic    TONSILLECTOMY      UPPER GASTROINTESTINAL ENDOSCOPY      x 6       Meds/Allergies:    Prior to Admission medications    Medication Sig Start Date End Date Taking?  Authorizing Provider   aspirin 81 MG tablet Take 81 mg by mouth every other day Every other day    Yes Historical Provider, MD   citalopram (CeleXA) 40 mg tablet Take 40 mg by mouth daily   Yes Historical Provider, MD   doxycycline (ADOXA) 100 MG tablet Take 100 mg by mouth 2 (two) times a day   Yes Historical Provider, MD   fenofibrate (TRIGLIDE) 50 MG tablet Take 134 mg by mouth daily     Yes Historical Provider, MD   folic acid (FOLVITE) 1 mg tablet Take 1 mg by mouth daily   Yes Historical Provider, MD   gabapentin (NEURONTIN) 100 mg capsule Take 100 mg by mouth 2 (two) times a day     Yes Historical Provider, MD   Ibrutinib 140 MG CAPS Take 280 mg by mouth daily   Yes Historical Provider, MD   insulin glargine (LANTUS) 100 units/mL subcutaneous injection Inject 15 Units under the skin every morning 9/20/17  Yes Yudelka Rodrigues MD   insulin lispro (HumaLOG) 100 units/mL injection Inject 12 Units under the skin 3 (three) times a day with meals 9/20/17  Yes Yudelka Rodrigues MD   obinutuzumab (GAZYVA) 1000 mg/40 mL SOLN Infuse into a venous catheter   Yes Historical Provider, MD   pantoprazole (PROTONIX) 40 mg tablet Take 40 mg by mouth 2 (two) times a day     Yes Historical Provider, MD   predniSONE 20 mg tablet Take 1 tablet by mouth daily  Patient taking differently: Take 40 mg by mouth daily   9/20/17  Yes Yudelka Rodrigues MD   al Rosia [place] oxide-diphenhydramine-lidocaine viscous (MAGIC MOUTHWASH) Take 10 mL by mouth 4 (four) times a day (before meals and at bedtime) for 30 days 10/5/17 11/4/17  Quin Camacho, DO   Alcohol Swabs PADS by Does not apply route 4 (four) times a day (before meals and at bedtime) E11 9 9/20/17   Yudelka Rodrigues MD   benzonatate (TESSALON) 200 MG capsule Take 200 mg by mouth 3 (three) times a day as needed for cough    Historical Provider, MD   glucose monitoring kit (FREESTYLE) monitoring kit 1 each by Does not apply route as needed ( ) E 11 9 9/20/17   Yudelka Rodrigues MD   Lancets (FREESTYLE) lancets Use as instructed--test 2 times a day    E 11 9 9/20/17   Yudelka Rodrigues MD   LORazepam (ATIVAN) 0 5 mg tablet Take 1 tablet by mouth every 8 (eight) hours as needed for anxiety for up to 10 days 3/10/17 8/1/17  Beck Mendiola MD   multivitamin [facility]) TABS Take 1 tablet by mouth daily    Historical Provider, MD   ondansetron (ZOFRAN) 8 mg tablet Take by mouth every 8 (eight) hours as needed for nausea or vomiting    Historical Provider, MD   sucralfate (CARAFATE) 1 g/10 mL suspension Take 10 mL by mouth 4 (four) times a day 10/5/17   Quin Camacho, DO     I have reviewed home medications with patient personally  Allergies:    Allergies   Allergen Reactions    Heparin Other (See Comments)     clot    Macrolides And Ketolides Other (See Comments)     Interacts with other meds he is taking       Social History:     Marital Status: /Civil Union   Occupation:   Patient Pre-hospital Living Situation:   Patient Pre-hospital Level of Mobility:   Patient Pre-hospital Diet Restrictions:   Substance Use History:   History   Alcohol Use    Yes     Comment: Occasional use     History   Smoking Status    Former Smoker   Smokeless Tobacco    Never Used     Comment: pt refuses to answer question     History   Drug Use No       Family History:    History reviewed  No pertinent family history  Physical Exam:     Vitals:   Blood Pressure: 116/79 (10/08/17 0245)  Pulse: 88 (10/08/17 0245)  Temperature: 99 2 °F (37 3 °C) (10/08/17 0245)  Temp Source: Tympanic (10/08/17 0245)  Respirations: 18 (10/08/17 0245)  Height: 6' 1" (185 4 cm) (10/08/17 0245)  Weight - Scale: 93 9 kg (207 lb 0 2 oz) (10/08/17 0245)  SpO2: 98 % (10/08/17 0245)    Physical Exam   Constitutional: He is oriented to person, place, and time  He appears well-developed and well-nourished  HENT:   Head: Normocephalic and atraumatic  Right Ear: External ear normal    Left Ear: External ear normal    Eyes: Conjunctivae and EOM are normal  Pupils are equal, round, and reactive to light  Neck: Normal range of motion  Neck supple  Cardiovascular: Normal rate, regular rhythm, normal heart sounds and intact distal pulses  Pulmonary/Chest: Effort normal and breath sounds normal    Abdominal: Soft  Bowel sounds are normal    Musculoskeletal: Normal range of motion  Neurological: He is alert and oriented to person, place, and time  He has normal reflexes  Skin: Skin is warm and dry  There is erythema  Swelling with fluctuancy on the scalp in the parieto-occipital region   Psychiatric: He has a normal mood and affect  His behavior is normal  Judgment and thought content normal    Nursing note and vitals reviewed        Additional Data:     Lab Results: I have personally reviewed pertinent reports  Results from last 7 days  Lab Units 10/07/17  2336   WBC Thousand/uL 1 38*   HEMOGLOBIN g/dL 10 1*   HEMATOCRIT % 31 1*   PLATELETS Thousands/uL 93*   LYMPHO PCT % 31   MONO PCT MAN % 30*   EOSINO PCT MANUAL % 4       Results from last 7 days  Lab Units 10/07/17  2336   SODIUM mmol/L 139   POTASSIUM mmol/L 3 4*   CHLORIDE mmol/L 103   CO2 mmol/L 29   BUN mg/dL 23   CREATININE mg/dL 1 23   CALCIUM mg/dL 9 0   TOTAL PROTEIN g/dL 5 8*   BILIRUBIN TOTAL mg/dL 0 65   ALK PHOS U/L 36*   ALT U/L 21   AST U/L 16   GLUCOSE RANDOM mg/dL 136       Results from last 7 days  Lab Units 10/07/17  2336   INR  1 19*       Imaging: I have personally reviewed pertinent reports  Xr Chest Pa & Lateral    Result Date: 9/18/2017  Narrative: CHEST - DUAL ENERGY INDICATION:  Decreased breath sounds on the left  COMPARISON:  Chest x-ray dated August 25, 2017  VIEWS:  PA (including soft tissue/bone algorithms) and lateral projections IMAGES:  5 FINDINGS:     Cardiomediastinal silhouette appears unremarkable  There is a right-sided Port-A-Cath with the tip at the level of the atriocaval junction  There is mild bibasilar atelectasis with no focal infiltrate or consolidation  No pneumothorax or pleural effusion  Visualized osseous structures appear within normal limits for the patient's age  Impression: No active pulmonary disease  Workstation performed: [id number]     Ct Head Without Contrast    Result Date: 10/8/2017  Narrative: CT BRAIN - WITHOUT CONTRAST INDICATION:  CLL  Pain at [place] of head with tender mass COMPARISON:  September 18, 2017 TECHNIQUE:  CT examination of the brain was performed  In addition to axial images, coronal reformatted images were created and submitted for interpretation  Radiation dose length product (DLP) for this visit:  1026 mGy-cm     This examination, like all CT scans performed in the [facility], was performed utilizing techniques to minimize radiation dose exposure, including the use of iterative reconstruction and automated exposure control  IMAGE QUALITY:  Diagnostic  FINDINGS:  PARENCHYMA: Age-related central atrophy  No intracranial mass, mass effect or midline shift  No CT signs of acute infarction  There is no parenchymal hemorrhage  VENTRICLES AND EXTRA-AXIAL SPACES:  Normal for patient's age  VISUALIZED ORBITS AND PARANASAL SINUSES:  Orbits appear normal  Moderate scattered sinus mucosal thickening is noted  Small amounts of fluid in the sphenoid sinuses  CALVARIUM AND EXTRACRANIAL SOFT TISSUES:  Soft tissue swelling posterior scalp at the vertex  Skin thickening  No focal mass or collection  This appears worse when compared to prior exam      Impression: No acute intracranial abnormality  Worsening Soft tissue swelling posterior scalp at the vertex  No focal mass or collection  Please correlate clinically for infection versus other etiologies including unlikely cutaneous CLL  Findings are consistent with the preliminary report from [facility] which was provided shortly after completion of the exam              Workstation performed: [id number]     Ct Head Wo Contrast    Result Date: 9/18/2017  Narrative: CT BRAIN - WITHOUT CONTRAST INDICATION:  Sudden onset of visual loss COMPARISON:  5/17/2011 TECHNIQUE:  CT examination of the brain was performed  In addition to axial images, coronal reformatted images were created and submitted for interpretation  Radiation dose length product (DLP) for this visit:  1048 mGy-cm   This examination, like all CT scans performed in the [facility], was performed utilizing techniques to minimize radiation dose exposure, including the use of iterative reconstruction and automated exposure control  IMAGE QUALITY:  Diagnostic  FINDINGS:  PARENCHYMA:  No intracranial mass, mass effect or midline shift  No CT signs of acute infarction  There is no parenchymal hemorrhage    Atherosclerotic calcifications evident along the internal carotid arteries  VENTRICLES AND EXTRA-AXIAL SPACES:  Normal for patient's age  VISUALIZED ORBITS AND PARANASAL SINUSES:  No acute findings CALVARIUM AND EXTRACRANIAL SOFT TISSUES:  Small area of swelling noted in the posterior left side of the scalp towards the vertex  No skull fracture seen  Impression: No acute intracranial abnormality  Workstation performed: [id number]       EKG, Pathology, and Other Studies Reviewed on Admission:   · EKG:     Allscripts/Epic Records Reviewed: Yes     ** Please Note: Dragon 360 Dictation voice to text software may have been used in the creation of this document   **

## 2017-10-08 NOTE — ED NOTES
Patient's blood work obtained from port-a-cath, right anterior chest, at this time by Rani Lilly  Patient tolerated well          Abdullahi Estevez RN  10/07/17 9569

## 2017-10-08 NOTE — CONSULTS
Consultation - Infectious Disease   Beverley Shoemaker 60 y o  male MRN: [id number]  Unit/Bed#: E2 -01 Encounter: [id number]      IMPRESSION & RECOMMENDATIONS:   Impression/Recommendations: This is a 60 y o  male, with CLL, currently undergoing chemotherapy, has scalp cellulitis progressing over last 2 months, not responding to p o  doxycycline  He was admitted last night with febrile neutropenia  He is clinically and systemically well, without evidence of sepsis or systemic toxicity  1   Scalp cellulitis  Given initial presentation with pimple on top of the scalp, there is concern of community-acquired MRSA infection  In addition, with patient having been on multiple antibiotics in the past and having had multiple hospitalization healthcare contacts, he does have significant risk for MRSA colonization  Therefore, I do agree with treating patient with IV vancomycin empirically  His lack of response to p  o  antibiotic is most likely secondary to poor immunological status from CLL on chemotherapy  CT of head does not review any subcutaneously abscess  Hopefully, patient was brought to IV vancomycin and can be transitioned to p  o  antibiotic in the next few days  At this point, p  o  doxycycline is redundant  Continue IV vancomycin for now  No further need for p o  doxycycline  I will discontinue  Serial scalp exams  Transition to p  o  antibiotics when patient is clinically improved  2   Febrile neutropenia  I suspect this is all secondary to above  There is not another obvious source of active infection  Blood cultures are negative so far  Patient appears to be responding to IV vancomycin with resolution of fever  Neutropenia is relatively mild with   Therefore, no other antibiotic for now  Admission blood cultures are negative so far  IV vancomycin as in above  No other antibiotic  Monitor temperature/ANC  Follow-up on pending blood cultures  3   HSV esophagitis  Patient completed a course of IV acyclovir last fall with improvement of symptom but persistent esophageal ulceration  I suspect that this will not be able to be eradicated, due to patient's significant immune suppression and being on high-dose prednisone  As long as he is not symptomatic and esophageal ulcer is stable, we can observe for now  Hold off on any further antiviral for now  4   CKD  Creatinine is baseline  Antibiotic dosages adjusted accordingly  Monitor creatinine  5   CLL, on chemotherapy  As stated above, patient's neutropenia is relatively mild  Monitor ANC  Previous records reviewed in detail  Discussed with the patient in detail regarding above plan  Discussed with Dr Priya Silvestre from Medicine Service  Thank you for this consultation  We will follow along with you  HISTORY OF PRESENT ILLNESS:  Reason for Consult:  Scalp cellulitis  HPI: Valente Grover is a 60 y o  male, with CLL, currently undergoing chemotherapy, noticed a pimple on the top of his scalp approximately 2 months ago  He popped it  Since then, he has an area of tenderness that is slowly spreading posteriorly  He saw his PCP almost a month ago  He was started on doxycycline  This has not improved his symptoms  Patient came into the ER last night with acute onset of fever and chills  On presentation to the ER, patient had high fever and leukopenia/neutropenia  He was admitted  IV vancomycin was started  We asked to evaluate the patient  This morning, patient feels about the same  His scalp pain is unchanged  However, temperature is down  He has no further chills  Patient denies any fever or chills at home, prior to yesterday  He denies any new focal symptoms  As stated above, patient had CLL, currently undergoing chemotherapy and long-term prednisone  Of note, patient has history of HSV esophagitis    He completed 3 week course of IV acyclovir with some improvement of symptom but has persistent esophageal ulceration  He is status post repeat EGD last week  REVIEW OF SYSTEMS:  A complete 12 point system-based review of systems is otherwise negative  PAST MEDICAL HISTORY:  Past Medical History:   Diagnosis Date    CAD (coronary artery disease) 2004    CKD (chronic kidney disease) stage 3, GFR 30-59 ml/min     [facility] [address] )     [facility]ch[facility]     Diabetes mellitus [facility]     H/O blood clots     Hemolytic anemia (HCC)     History of TIA (transient ischemic attack)     Hyperlipidemia     Hypertension     Multiple gastric ulcers     Myocardial infarction     Recurrent sinusitis     Thrombocytopenia (HCC)     Vertigo      Past Surgical History:   Procedure Laterality Date    ARTHROSCOPIC REPAIR ACL      lt knee    PORTACATH PLACEMENT      DC ESOPHAGOGASTRODUODENOSCOPY TRANSORAL DIAGNOSTIC N/A 10/5/2017    Procedure: ESOPHAGOGASTRODUODENOSCOPY (EGD) with bx;  Surgeon: Breezy Soriano DO;  Location: [facility]; Service: Gastroenterology    DC ESOPHAGOSCOPY FLEXIBLE TRANSORAL WITH BIOPSY N/A 9/2/2016    Procedure: ESOPHAGOGASTRODUODENOSCOPY (EGD); ESOPHAGEAL DILATION; ESOPHAGEAL BIOPSY;  Surgeon: Julissa Sloan MD;  [facility];  Service: Thoracic    TONSILLECTOMY      UPPER GASTROINTESTINAL ENDOSCOPY      x 6     Problem list reviewed  FAMILY HISTORY:  Non-contributory    SOCIAL HISTORY:  History   Alcohol Use    Yes     Comment: Occasional use     History   Drug Use No     History   Smoking Status    Former Smoker   Smokeless Tobacco    Never Used     Comment: pt refuses to answer question       ALLERGIES:  Allergies   Allergen Reactions    Heparin Other (See Comments)     clot    Macrolides And Ketolides Other (See Comments)     Interacts with other meds he is taking       MEDICATIONS:  All current active medications have been reviewed  Patient is currently on IV vancomycin and p o  doxycycline  PHYSICAL EXAM:  Vitals:  HR:  [] 84  Resp:  [16-18] 16  BP: (116-140)/(63-79) 129/63  SpO2:  [91 %-98 %] 98 %  Temp (24hrs), Av 3 °F (37 9 °C), Min:98 6 °F (37 °C), Max:102 6 °F (39 2 °C)  Current: Temperature: 98 6 °F (37 °C)     Physical Exam:  General:  Chronically ill appearing, comfortable, nontoxic, in no acute distress  Awake, alert and oriented x 3  Eyes:  Conjunctive clear with no hemorrhages or effusions  Oropharynx:  No ulcers, no lesions, pharynx benign, no tonsillitis  Head:  Area of erythema on top of head  Mild warmth  Mild tenderness  No fluctuance  No purulence  Neck:  Supple, no lymphadenopathy, no mass, nontender  Lungs:  Expansion symmetric, no rales, no wheezing, no accessory muscle use  Cardiac:  Regular rate and rhythm, normal S1, normal S2, no murmurs  Abdomen:  Soft, nondistended, non-tender, no HSM  Extremities:  Trace edema, no erythema, nontender  No ulcers  Skin:  No rashes, no ulcers  Neurological:  Moves all four extremities spontaneously, sensation grossly intact    LABS, IMAGING, & OTHER STUDIES:  Lab Results:  I have personally reviewed pertinent labs      Results from last 7 days  Lab Units 10/08/17  0546 10/07/17  2336 10/02/17  0815   SODIUM mmol/L 140 139 140   POTASSIUM mmol/L 3 8 3 4* 3 9   CHLORIDE mmol/L 105 103 103   CO2 mmol/L 28 29 28   ANION GAP mmol/L 7 7 9   BUN mg/dL 18 23 17   CREATININE mg/dL 1 00 1 23 0 90   EGFR ml/min/1 73sq m 80 62 91   GLUCOSE RANDOM mg/dL 154* 136 140   CALCIUM mg/dL 8 6 9 0 9 4   AST U/L 15 16 26   ALT U/L 19 21 25   ALK PHOS U/L 27* 36* 37*   TOTAL PROTEIN g/dL 5 4* 5 8* 5 8*   ALBUMIN g/dL 3 3* 3 6 3 8   BILIRUBIN TOTAL mg/dL 0 72 0 65 1 30*       Results from last 7 days  Lab Units 10/08/17  0546 10/07/17  2336 10/06/17  1057   WBC Thousand/uL 1 33* 1 38* 1 60*   HEMOGLOBIN g/dL 9 7* 10 1* 11 9*   PLATELETS Thousands/uL 95* 93* 108*           Imaging Studies:   I have personally reviewed pertinent imaging study reports and images in PACS  CXR reviewed personally  No infiltrates or consolidations  Head CT reviewed personally  There is soft tissue edema on the posterior scalp  No collection  EKG, Pathology, and Other Studies:   I have personally reviewed pertinent reports

## 2017-10-09 LAB
GLUCOSE SERPL-MCNC: 110 MG/DL (ref 65–140)
GLUCOSE SERPL-MCNC: 122 MG/DL (ref 65–140)
GLUCOSE SERPL-MCNC: 161 MG/DL (ref 65–140)
GLUCOSE SERPL-MCNC: 91 MG/DL (ref 65–140)

## 2017-10-09 PROCEDURE — 82948 REAGENT STRIP/BLOOD GLUCOSE: CPT

## 2017-10-09 RX ADMIN — FENOFIBRATE 145 MG: 145 TABLET ORAL at 09:46

## 2017-10-09 RX ADMIN — INSULIN LISPRO 12 UNITS: 100 INJECTION, SOLUTION INTRAVENOUS; SUBCUTANEOUS at 09:45

## 2017-10-09 RX ADMIN — LIDOCAINE HYDROCHLORIDE 10 ML: 20 SOLUTION ORAL; TOPICAL at 06:30

## 2017-10-09 RX ADMIN — ACETAMINOPHEN 650 MG: 325 TABLET, FILM COATED ORAL at 04:35

## 2017-10-09 RX ADMIN — VANCOMYCIN HYDROCHLORIDE 1000 MG: 1 INJECTION, SOLUTION INTRAVENOUS at 06:25

## 2017-10-09 RX ADMIN — GABAPENTIN 100 MG: 100 CAPSULE ORAL at 18:12

## 2017-10-09 RX ADMIN — INSULIN GLARGINE 15 UNITS: 100 INJECTION, SOLUTION SUBCUTANEOUS at 09:44

## 2017-10-09 RX ADMIN — PANTOPRAZOLE SODIUM 40 MG: 40 TABLET, DELAYED RELEASE ORAL at 06:27

## 2017-10-09 RX ADMIN — SUCRALFATE 500 MG: 1 SUSPENSION ORAL at 18:04

## 2017-10-09 RX ADMIN — SODIUM CHLORIDE 60 ML/HR: 0.9 INJECTION, SOLUTION INTRAVENOUS at 04:36

## 2017-10-09 RX ADMIN — INSULIN LISPRO 12 UNITS: 100 INJECTION, SOLUTION INTRAVENOUS; SUBCUTANEOUS at 12:49

## 2017-10-09 RX ADMIN — CITALOPRAM HYDROBROMIDE 40 MG: 20 TABLET ORAL at 09:46

## 2017-10-09 RX ADMIN — SUCRALFATE 500 MG: 1 SUSPENSION ORAL at 06:26

## 2017-10-09 RX ADMIN — FOLIC ACID 1 MG: 1 TABLET ORAL at 09:47

## 2017-10-09 RX ADMIN — LIDOCAINE HYDROCHLORIDE 10 ML: 20 SOLUTION ORAL; TOPICAL at 22:51

## 2017-10-09 RX ADMIN — LIDOCAINE HYDROCHLORIDE 10 ML: 20 SOLUTION ORAL; TOPICAL at 12:47

## 2017-10-09 RX ADMIN — SODIUM CHLORIDE 60 ML/HR: 0.9 INJECTION, SOLUTION INTRAVENOUS at 23:11

## 2017-10-09 RX ADMIN — HYDROMORPHONE HYDROCHLORIDE 1 MG: 1 INJECTION, SOLUTION INTRAMUSCULAR; INTRAVENOUS; SUBCUTANEOUS at 09:55

## 2017-10-09 RX ADMIN — SUCRALFATE 500 MG: 1 SUSPENSION ORAL at 12:46

## 2017-10-09 RX ADMIN — SUCRALFATE 500 MG: 1 SUSPENSION ORAL at 22:51

## 2017-10-09 RX ADMIN — GABAPENTIN 100 MG: 100 CAPSULE ORAL at 09:46

## 2017-10-09 RX ADMIN — VANCOMYCIN HYDROCHLORIDE 1000 MG: 1 INJECTION, SOLUTION INTRAVENOUS at 18:27

## 2017-10-09 RX ADMIN — HYDROMORPHONE HYDROCHLORIDE 1 MG: 1 INJECTION, SOLUTION INTRAMUSCULAR; INTRAVENOUS; SUBCUTANEOUS at 14:23

## 2017-10-09 RX ADMIN — HYDROMORPHONE HYDROCHLORIDE 1 MG: 1 INJECTION, SOLUTION INTRAMUSCULAR; INTRAVENOUS; SUBCUTANEOUS at 18:28

## 2017-10-09 RX ADMIN — PANTOPRAZOLE SODIUM 40 MG: 40 TABLET, DELAYED RELEASE ORAL at 18:03

## 2017-10-09 RX ADMIN — HYDROMORPHONE HYDROCHLORIDE 1 MG: 1 INJECTION, SOLUTION INTRAMUSCULAR; INTRAVENOUS; SUBCUTANEOUS at 22:58

## 2017-10-09 RX ADMIN — INSULIN LISPRO 1 UNITS: 100 INJECTION, SOLUTION INTRAVENOUS; SUBCUTANEOUS at 22:50

## 2017-10-09 RX ADMIN — INSULIN LISPRO 12 UNITS: 100 INJECTION, SOLUTION INTRAVENOUS; SUBCUTANEOUS at 18:03

## 2017-10-09 RX ADMIN — PREDNISONE 20 MG: 20 TABLET ORAL at 09:47

## 2017-10-09 RX ADMIN — LIDOCAINE HYDROCHLORIDE 10 ML: 20 SOLUTION ORAL; TOPICAL at 18:03

## 2017-10-09 RX ADMIN — TBO-FILGRASTIM 480 MCG: 480 INJECTION, SOLUTION SUBCUTANEOUS at 12:45

## 2017-10-09 NOTE — PROGRESS NOTES
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  Generalized anxiety disorder  Recommendation:  Discussed in detail with Infectious Disease Dr Joe Crowley,  at bedside and with patient  He seems to be responding favorably to IV vancomycin  He has underlying history of chronic kidney disease for which he was seen by Nephrology team in the past   Would recommend monitoring for any nephrotoxicity and optimizing doses which Dr Gordon Reinoso is managing  If patient has worsening of the collection underneath the scalp then possibility of having interventional radiology aspirate that would be an option  Due to patient's immunosuppressed situation, interventions/procedures surgeries could be dangerous due to poor healing  Suggest evaluation by Dr Joleen Qureshi regarding patient's severely immunosuppressed situation as well as leukopenia thrombocytopenia and anemia  Patient has been taking aspirin per his cardiologist which could be discontinued if Hematology service feels that this could be useful  We could also cut down on patient's prednisone considering is severely leukopenic but I dw Dr Joleen Qureshi and he is concerned that his hemolytic anemia might get flared up  Hematology has advised to start Filograstim 480 mcg subcutaneously 2 doses  Subjective:   Patient seen and examined today  EMR and overnight events reviewed  He was admitted by [address] [place] [address] and requested for our service to provide further care  Patient is admitted due to swelling on his scalp which is not improving    He apparently had popped open a small boil which subsequently got more tender and painful  He had called his hematologist and was advised by Dr Nicolle Acevedo to go to the ER for IV antibiotics  Patient was admitted by the [facility] and ID was consulted  Since he has been started on vancomycin he feels tenderness and swelling in his scalp slightly better  He has no fever or chills  He has longstanding history of chronic lymphocytic leukemia hemolytic anemia and also had a recent upper GI endoscopy where he indicates that he has a nonhealing ulcer in his food pipe and the GI team is contaminating for placement of a feeding tube  Unfortunately patient still is maintained on steroids because of his hemolytic anemia and continues to smoke  Both of these things have been detrimental but unfortunately he carries extremely high risk of being off prednisone as he may have life-threatening hemolysis  Review of Systems     Constitutional: Low appetite  Improved chills, diaphoresis, fatigue and fever  HEENT: No congestion, sore throat  No visual complaints  Painful area on top of his scalp  Respiratory: No cough, chest tightness, shortness of breath and wheezing  Cardiovascular: No chest pain and palpitations  GI: Negative for abdominal distention, pain, constipation, diarrhea or nausea  Endocrine: Newly diagnosed diabetes  On Insulin as per endocrinology  Genitourinary: Negative for decreased urine volume, dysuria, flank pain and urgency  Skin: + lesions on his arm and left index finger  Neurological: Negative for dizziness, weakness, numbness and headaches  Hematological: Negative for spontaneous bruising or bleeding  Psychiatric: Negative for confusion, dysphoric mood, hallucinations  All other systems reviewed and are negative         OBJECTIVE:     Vitals:     HR:  [71-76] 71  Resp:  [16] 16  BP: (131-142)/(60-76) 142/76  SpO2:  [95 %-98 %] 96 %  Temp (24hrs), Av 6 °F (36 4 °C), Min:97 3 °F (36 3 °C), Max:97 8 °F (36 6 °C)  Current: Temperature: (!) 97 3 °F (36 3 °C)    Intake/Output Summary (Last 24 hours) at 10/09/17 1207  Last data filed at 10/09/17 0430   Gross per 24 hour   Intake             1044 ml   Output                0 ml   Net             1044 ml     Body mass index is 27 31 kg/m²  Physical Exam      General Appearance:  Awake,alert, cooperative  Not in distress  Pallor / Icterus/ Cyanosis negative  Oropharynx no thrush  Tongue protrudes in midline  Head:  Scalp has mild boggy swelling over the vortex  Mild tenderness/ soft /no induration/ mildly edematous  No surrounding redness  No streak  No palpable occipital LN  No asymmetry / aloplecia +   Eyes:  No eye redness or discharge bilaterally  Neck: Supple, no raised JVP  Back:   Symmetric, no kypho-scoliosis  Lungs:   B/L Clear to auscultation, no wheezing or rhonchi  Normal broncho-alveolar air entry  No pleural rubs  Heart:   Regular S1S2, A2P2 normal, no murmur or gallop  Abdomen:   Soft, non-tender,no rebound, bowel sounds+  Musculoskeletal: Extremities no cyanosis or edema  Psych: + anxious/ No hallucinations or delusions  Neurologic:           Skin:  Endocrine:  Vascular: Awake and alert  Mentation intact  Speech is intact  Facial symmetry is maintained  Good shoulder shrug and hand grasp  Muscle strength is 5/5 in all muscle groups  Light touch and temperature sensation is maintained  Scalp swelling as above / maculopapular crusted 5 mm lesion on left 2nd digit  Multiple ecchymosis on B/l arm  No thyromegaly / no lid lag  Homans sign is negative  B/L Calf are supple and non tender         Labs, Imaging, & Other studies:    All pertinent labs and imaging studies were personally reviewed    Results from last 7 days  Lab Units 10/08/17  0546 10/07/17  2336 10/06/17  1057   WBC Thousand/uL 1 33* 1 38* 1 60*   HEMOGLOBIN g/dL 9 7* 10 1* 11 9*   PLATELETS Thousands/uL 95* 93* 108*       Results from last 7 days  Lab Units 10/08/17  0546 10/07/17  2336   SODIUM mmol/L 140 139   POTASSIUM mmol/L 3 8 3 4*   CHLORIDE mmol/L 105 103   CO2 mmol/L 28 29   ANION GAP mmol/L 7 7   BUN mg/dL 18 23   CREATININE mg/dL 1 00 1 23   EGFR ml/min/1 73sq m 80 62   GLUCOSE RANDOM mg/dL 154* 136   CALCIUM mg/dL 8 6 9 0   AST U/L 15 16   ALT U/L 19 21   ALK PHOS U/L 27* 36*   TOTAL PROTEIN g/dL 5 4* 5 8*   ALBUMIN g/dL 3 3* 3 6   BILIRUBIN TOTAL mg/dL 0 72 0 65       Results from last 7 days  Lab Units 10/07/17  2308   BLOOD CULTURE  No Growth at 24 hrs  No Growth at 24 hrs         Current Meds:  Current Facility-Administered Medications   Medication Dose Route Frequency Provider Last Rate Last Dose    acetaminophen (TYLENOL) tablet 650 mg  650 mg Oral Q6H PRN Gaynelle Lesches, MD   650 mg at 10/09/17 0435    al mag oxide-diphenhydramine-lidocaine viscous (MAGIC MOUTHWASH) suspension 10 mL  10 mL Oral 4x Daily (AC & HS) Luz Monterroso MD   10 mL at 10/09/17 0630    aspirin chewable tablet 81 mg  81 mg Oral Every Other Day Gaynelle Lesches, MD   81 mg at 10/08/17 0838    benzonatate (TESSALON PERLES) capsule 200 mg  200 mg Oral TID PRN Gaynelle Lesches, MD        citalopram (CeleXA) tablet 40 mg  40 mg Oral Daily Luz Monterroso MD   40 mg at 10/09/17 0946    fenofibrate (TRICOR) tablet 145 mg  145 mg Oral Daily Gaynelle Lesches, MD   145 mg at 73/28/89 8222    folic acid (FOLVITE) tablet 1 mg  1 mg Oral Daily Luz Monterroso MD   1 mg at 10/09/17 0947    gabapentin (NEURONTIN) capsule 100 mg  100 mg Oral BID Gaynelle Lesches, MD   100 mg at 10/09/17 0946    HYDROmorphone (DILAUDID) 1 mg/mL injection 1 mg  1 mg Intravenous Q4H PRN Mayelin Reese DO   1 mg at 10/09/17 0955    insulin glargine (LANTUS) subcutaneous injection 15 Units  15 Units Subcutaneous ERICKM Gaynelle Lesches, MD   15 Units at 10/09/17 0944    insulin lispro (HumaLOG) 100 units/mL subcutaneous injection 1-6 Units  1-6 Units Subcutaneous HS Luz Monterroso MD  insulin lispro (HumaLOG) 100 units/mL subcutaneous injection 12 Units  12 Units Subcutaneous TID With Meals Corby Edwards MD   12 Units at 10/09/17 0945    insulin lispro (HumaLOG) 100 units/mL subcutaneous injection 4-20 Units  4-20 Units Subcutaneous TID AC Luz Sneed MD   4 Units at 10/08/17 0818    LORazepam (ATIVAN) tablet 0 5 mg  0 5 mg Oral Q8H PRN Luz Sneed MD        pantoprazole (PROTONIX) EC tablet 40 mg  40 mg Oral BID AC Luz Sneed MD   40 mg at 10/09/17 5675    predniSONE tablet 20 mg  20 mg Oral Daily Luz Sneed MD   20 mg at 10/09/17 0947    sodium chloride 0 9 % infusion  60 mL/hr Intravenous Continuous Mayelin Reees DO 60 mL/hr at 10/09/17 0436 60 mL/hr at 10/09/17 0436    sucralfate (CARAFATE) oral suspension 500 mg  500 mg Oral 4x Daily (AC & HS) Corby Edwards MD   500 mg at 10/09/17 0626    vancomycin (VANCOCIN) IVPB (premix) 1,000 mg  10 mg/kg Intravenous Q12H Coretta Perkins  mL/hr at 10/09/17 0625 1,000 mg at 10/09/17 6040     Home Meds:  Prescriptions Prior to Admission   Medication    aspirin 81 MG tablet    citalopram (CeleXA) 40 mg tablet    doxycycline (ADOXA) 100 MG tablet    fenofibrate (TRIGLIDE) 50 MG tablet    folic acid (FOLVITE) 1 mg tablet    gabapentin (NEURONTIN) 100 mg capsule    Ibrutinib 140 MG CAPS    insulin glargine (LANTUS) 100 units/mL subcutaneous injection    insulin lispro (HumaLOG) 100 units/mL injection    obinutuzumab (GAZYVA) 1000 mg/40 mL SOLN    pantoprazole (PROTONIX) 40 mg tablet    predniSONE 20 mg tablet    al mag oxide-diphenhydramine-lidocaine viscous (MAGIC MOUTHWASH)    Alcohol Swabs PADS    benzonatate (TESSALON) 200 MG capsule    glucose monitoring kit (FREESTYLE) monitoring kit    Lancets (FREESTYLE) lancets    LORazepam (ATIVAN) 0 5 mg tablet    multivitamin (THERAGRAN) TABS    ondansetron (ZOFRAN) 8 mg tablet    sucralfate (CARAFATE) 1 g/10 mL suspension       Continuous Infusions:    sodium chloride 60 mL/hr Last Rate: 60 mL/hr (10/09/17 0436)       Invasive Devices     Central Venous Catheter Line            Port A Cath 10/07/17 Right Chest 1 day          Peripheral Intravenous Line            Peripheral IV 09/18/17 Left;Dorsal (posterior) Hand 20 days                  Portions of the record may have been created with voice recognition software  Occasional wrong word or "sound a like" substitutions may have occurred due to the inherent limitations of voice recognition software  Read the chart carefully and recognize, using context, where substitutions have occurred

## 2017-10-09 NOTE — CONSULTS
Oncology Consult Note  Valente Grover 60 y o  male MRN: [id number]  Unit/Bed#: E2 -01 Encounter: [id number]      Presenting Complaint:  Chronic lymphocytic leukemia  Pancytopenia  Fever due to scalp cellulitis  History of Presenting Illness:  A 59-year-old gentleman who has long history of chronic lymphocytic leukemia, which was initially diagnosed in 2001  He has been under the care of Dr Gurpreet Rahman for 12 years  He has been extensively treated with multiple line of chemotherapy, monoclonal antibody as well as targeted treatment  Most recently, he has been on obinutuzumab and ibrutinib  However, he took last ibrutinib last week, since his funding run out for this medication  He came in with complaint of fever, 102  He has been having some skin redness over the top of the head  This skin lesion was erythematous  This was thought to be scalp cellulitis  He is on IV antibiotics with quick resolution of fever and erythematous skin lesions  Currently, he feels well  He has no respiratory symptoms  He denied any pain  He has pancytopenia  His neutrophil was less than 0 5  He is started on G-CSF  His performance status appeared to be 0 to 1/4 on the ECOG scale  Review of Systems - As stated in the HPI otherwise the fourteen point review of systems was negative      Past Medical History:   Diagnosis Date    CAD (coronary artery disease) 2004    CKD (chronic kidney disease) stage 3, GFR 30-59 ml/min     [facility]     COPD [facility]chronic obstructive pulmonary disease) [facility]     Diabetes mellitus ([facility]     H/O blood clots     Hemolytic anemia (HCC)     History of TIA (transient ischemic attack)     Hyperlipidemia     Hypertension     Multiple gastric ulcers     Myocardial infarction     Recurrent sinusitis     Thrombocytopenia (HCC)     Vertigo        Social History     Social History    Marital status: /Civil Union     Spouse name: N/A    Number of children: N/A    Years of education: N/A     Social History Main Topics    Smoking status: Former Smoker    Smokeless tobacco: Never Used      Comment: pt refuses to answer question    Alcohol use Yes      Comment: Occasional use    Drug use: No    Sexual activity: Not Asked     Other Topics Concern    None     Social History Narrative    None       History reviewed  No pertinent family history      Allergies   Allergen Reactions    Heparin Other (See Comments)     clot    Macrolides And Ketolides Other (See Comments)     Interacts with other meds he is taking         Current Facility-Administered Medications:     acetaminophen (TYLENOL) tablet 650 mg, 650 mg, Oral, Q6H PRN, Lorne Peres MD, 650 mg at 10/09/17 0435    al mag oxide-diphenhydramine-lidocaine viscous (MAGIC MOUTHWASH) suspension 10 mL, 10 mL, Oral, 4x Daily (AC & HS), Luz Castellon MD, 10 mL at 10/09/17 1247    aspirin chewable tablet 81 mg, 81 mg, Oral, Every Other Day, Lorne Peres MD, 81 mg at 10/08/17 0838    benzonatate (TESSALON PERLES) capsule 200 mg, 200 mg, Oral, TID PRN, Lorne Peres MD    citalopram (CeleXA) tablet 40 mg, 40 mg, Oral, Daily, Luz Castellon MD, 40 mg at 10/09/17 0946    fenofibrate (TRICOR) tablet 145 mg, 145 mg, Oral, Daily, Luz Castellon MD, 145 mg at 00/33/98 8610    folic acid (FOLVITE) tablet 1 mg, 1 mg, Oral, Daily, Luz Castellon MD, 1 mg at 10/09/17 0947    gabapentin (NEURONTIN) capsule 100 mg, 100 mg, Oral, BID, Lorne Peres MD, 100 mg at 10/09/17 0946    HYDROmorphone (DILAUDID) 1 mg/mL injection 1 mg, 1 mg, Intravenous, Q4H PRN, Mayelin Reese DO, 1 mg at 10/09/17 1423    insulin glargine (LANTUS) subcutaneous injection 15 Units, 15 Units, Subcutaneous, QA, Luz Singh MD, 15 Units at 10/09/17 0944    insulin lispro (HumaLOG) 100 units/mL subcutaneous injection 1-6 Units, 1-6 Units, Subcutaneous, HS, Luz Castellon MD    insulin lispro (HumaLOG) 100 units/mL subcutaneous injection 12 Units, 12 Units, Subcutaneous, TID With Meals, Bisi Glynn MD, 12 Units at 10/09/17 1249    insulin lispro (HumaLOG) 100 units/mL subcutaneous injection 4-20 Units, 4-20 Units, Subcutaneous, TID AC, 4 Units at 10/08/17 0818 **AND** Fingerstick Glucose (POCT), , , TID AC, Luz Paiz MD    LORazepam (ATIVAN) tablet 0 5 mg, 0 5 mg, Oral, Q8H PRN, Luz Paiz MD    pantoprazole (PROTONIX) EC tablet 40 mg, 40 mg, Oral, BID AC, Luz Paiz MD, 40 mg at 10/09/17 0627    predniSONE tablet 20 mg, 20 mg, Oral, Daily, Luz Paiz MD, 20 mg at 10/09/17 0947    sodium chloride 0 9 % infusion, 60 mL/hr, Intravenous, Continuous, Mayelin Reese DO, Last Rate: 60 mL/hr at 10/09/17 0725, 60 mL/hr at 10/09/17 0725    sucralfate (CARAFATE) oral suspension 500 mg, 500 mg, Oral, 4x Daily (AC & HS), Luz Paiz MD, 500 mg at 10/09/17 1246    tbo-filgrastim (GRANIX) subcutaneous injection 480 mcg, 480 mcg, Subcutaneous, Daily, Mauro Oshea MD, 480 mcg at 10/09/17 1245    vancomycin (VANCOCIN) IVPB (premix) 1,000 mg, 10 mg/kg, Intravenous, Q12H, Shannon Meehan MD, Stopped at 10/09/17 0725      /70   Pulse 81   Temp (!) 97 1 °F (36 2 °C) (Tympanic)   Resp 16   Ht 6' 1" (1 854 m)   Wt 93 9 kg (207 lb 0 2 oz)   SpO2 97%   BMI 27 31 kg/m²     General Appearance:    Alert, oriented        Eyes:    PERRL   Ears:    Normal external ear canals, both ears   Nose:   Nares normal, septum midline   Throat:   Mucosa moist  Pharynx without injection  Neck:   Supple       Lungs:     Clear to auscultation bilaterally   Chest Wall:    No tenderness or deformity    Heart:    Regular rate and rhythm       Abdomen:     Soft, non-tender, bowel sounds +, mild hepatosplenomegaly  Extremities:   Extremities no cyanosis or edema       Skin:   Minimal residual erythematous change on the top of scalp   no icterus      Lymph nodes:   Cervical, supraclavicular, and axillary nodes normal   Neurologic:   CNII-XII intact, normal strength, sensation and reflexes     Throughout               Recent Results (from the past 48 hour(s))   Blood culture #1    Collection Time: 10/07/17 11:08 PM   Result Value Ref Range    Blood Culture No Growth at 24 hrs  Blood culture #2    Collection Time: 10/07/17 11:08 PM   Result Value Ref Range    Blood Culture No Growth at 24 hrs      CBC and differential    Collection Time: 10/07/17 11:36 PM   Result Value Ref Range    WBC 1 38 (LL) 4 31 - 10 16 Thousand/uL    RBC 3 42 (L) 3 88 - 5 62 Million/uL    Hemoglobin 10 1 (L) 12 0 - 17 0 g/dL    Hematocrit 31 1 (L) 36 5 - 49 3 %    MCV 91 82 - 98 fL    MCH 29 5 26 8 - 34 3 pg    MCHC 32 5 31 4 - 37 4 g/dL    RDW 13 3 11 6 - 15 1 %    MPV 10 0 8 9 - 12 7 fL    Platelets 93 (L) 107 - 390 Thousands/uL    nRBC 0 /100 WBCs   Protime-INR    Collection Time: 10/07/17 11:36 PM   Result Value Ref Range    Protime 15 2 (H) 12 1 - 14 4 seconds    INR 1 19 (H) 0 86 - 1 16   APTT    Collection Time: 10/07/17 11:36 PM   Result Value Ref Range    PTT 31 23 - 35 seconds   Comprehensive metabolic panel    Collection Time: 10/07/17 11:36 PM   Result Value Ref Range    Sodium 139 136 - 145 mmol/L    Potassium 3 4 (L) 3 5 - 5 3 mmol/L    Chloride 103 100 - 108 mmol/L    CO2 29 21 - 32 mmol/L    Anion Gap 7 4 - 13 mmol/L    BUN 23 5 - 25 mg/dL    Creatinine 1 23 0 60 - 1 30 mg/dL    Glucose 136 65 - 140 mg/dL    Calcium 9 0 8 3 - 10 1 mg/dL    AST 16 5 - 45 U/L    ALT 21 12 - 78 U/L    Alkaline Phosphatase 36 (L) 46 - 116 U/L    Total Protein 5 8 (L) 6 4 - 8 2 g/dL    Albumin 3 6 3 5 - 5 0 g/dL    Total Bilirubin 0 65 0 20 - 1 00 mg/dL    eGFR 62 ml/min/1 73sq m   High sensitivity CRP    Collection Time: 10/07/17 11:36 PM   Result Value Ref Range    CRP, High Sensitivity 26 35 mg/L   Sedimentation rate, automated    Collection Time: 10/07/17 11:36 PM   Result Value Ref Range    Sed Rate 11 (H) 0 - 10 mm/hour   Magnesium    Collection Time: 10/07/17 11:36 PM   Result Value Ref Range    Magnesium 1 6 1 6 - 2 6 mg/dL   Type and screen    Collection Time: 10/07/17 11:36 PM   Result Value Ref Range    ABO Grouping O     Rh Factor Positive     Antibody Screen Positive     Specimen Expiration Date [id number]    Manual Differential(PHLEBS Do Not Order)    Collection Time: 10/07/17 11:36 PM   Result Value Ref Range    Segmented % 24 (L) 43 - 75 %    Bands % 10 (H) 0 - 8 %    Lymphocytes % 31 14 - 44 %    Monocytes % 30 (H) 4 - 12 %    Eosinophils % 4 0 - 6 %    Basophils % 1 0 - 1 %    Absolute Neutrophils 0 47 (L) 1 85 - 7 62 Thousand/uL    Lymphocytes Absolute 0 43 (L) 0 60 - 4 47 Thousand/uL    Monocytes Absolute 0 41 0 00 - 1 22 Thousand/uL    Eosinophils Absolute 0 06 0 00 - 0 40 Thousand/uL    Basophils Absolute 0 01 0 00 - 0 10 Thousand/uL    Total Counted 100     Anisocytosis Present     Platelet Estimate Decreased (A) Adequate   ED Urine Macroscopic    Collection Time: 10/08/17 12:54 AM   Result Value Ref Range    Color, UA Yellow     Clarity, UA Clear     pH, UA 6 0 4 5 - 8 0    Leukocytes, UA Negative Negative    Nitrite, UA Negative Negative    Protein, UA Negative Negative mg/dl    Glucose, UA Negative Negative mg/dl    Ketones, UA Negative Negative mg/dl    Urobilinogen, UA 0 2 0 2, 1 0 E U /dl E U /dl    Bilirubin, UA Negative Negative    Blood, UA Negative Negative    Specific Gravity, UA 1 010 1 003 - 1 030   POCT urinalysis dipstick    Collection Time: 10/08/17  1:24 AM   Result Value Ref Range    Color, UA yellow & clear    Comprehensive metabolic panel    Collection Time: 10/08/17  5:46 AM   Result Value Ref Range    Sodium 140 136 - 145 mmol/L    Potassium 3 8 3 5 - 5 3 mmol/L    Chloride 105 100 - 108 mmol/L    CO2 28 21 - 32 mmol/L    Anion Gap 7 4 - 13 mmol/L    BUN 18 5 - 25 mg/dL    Creatinine 1 00 0 60 - 1 30 mg/dL    Glucose 154 (H) 65 - 140 mg/dL    Calcium 8 6 8 3 - 10 1 mg/dL    AST 15 5 - 45 U/L    ALT 19 12 - 78 U/L    Alkaline Phosphatase 27 (L) 46 - 116 U/L    Total Protein 5 4 (L) 6 4 - 8 2 g/dL    Albumin 3 3 (L) 3 5 - 5 0 g/dL    Total Bilirubin 0 72 0 20 - 1 00 mg/dL    eGFR 80 ml/min/1 73sq m   CBC (With Platelets)    Collection Time: 10/08/17  5:46 AM   Result Value Ref Range    WBC 1 33 (LL) 4 31 - 10 16 Thousand/uL    RBC 3 33 (L) 3 88 - 5 62 Million/uL    Hemoglobin 9 7 (L) 12 0 - 17 0 g/dL    Hematocrit 30 6 (L) 36 5 - 49 3 %    MCV 92 82 - 98 fL    MCH 29 1 26 8 - 34 3 pg    MCHC 31 7 31 4 - 37 4 g/dL    RDW 13 5 11 6 - 15 1 %    Platelets 95 (L) 679 - 390 Thousands/uL    MPV 10 5 8 9 - 12 7 fL   Fingerstick Glucose (POCT)    Collection Time: 10/08/17  7:19 AM   Result Value Ref Range    POC Glucose 156 (H) 65 - 140 mg/dl   Lactic acid, plasma    Collection Time: 10/08/17 11:04 AM   Result Value Ref Range    LACTIC ACID 0 7 0 5 - 2 0 mmol/L   Fingerstick Glucose (POCT)    Collection Time: 10/08/17 11:09 AM   Result Value Ref Range    POC Glucose 60 (L) 65 - 140 mg/dl   Fingerstick Glucose (POCT)    Collection Time: 10/08/17 11:43 AM   Result Value Ref Range    POC Glucose 123 65 - 140 mg/dl   Fingerstick Glucose (POCT)    Collection Time: 10/08/17  4:33 PM   Result Value Ref Range    POC Glucose 112 65 - 140 mg/dl   Fingerstick Glucose (POCT)    Collection Time: 10/08/17  9:28 PM   Result Value Ref Range    POC Glucose 121 65 - 140 mg/dl   Fingerstick Glucose (POCT)    Collection Time: 10/09/17  7:32 AM   Result Value Ref Range    POC Glucose 122 65 - 140 mg/dl   Fingerstick Glucose (POCT)    Collection Time: 10/09/17 11:30 AM   Result Value Ref Range    POC Glucose 91 65 - 140 mg/dl   Fingerstick Glucose (POCT)    Collection Time: 10/09/17  3:59 PM   Result Value Ref Range    POC Glucose 110 65 - 140 mg/dl         Xr Chest 2 Views    Result Date: 10/8/2017  Narrative: CHEST - DUAL ENERGY INDICATION: 42-year-old male, fever COMPARISON: 9/18/2017 chest x-ray VIEWS:  PA (including soft tissue/bone algorithms) and lateral projections; 4 images FINDINGS: The lungs are clear  No pleural effusions  The cardiomediastinal silhouette is unremarkable  Bony thorax is unremarkable  Right-sided portacatheter, typical appearance, terminating at cavoatrial junction  No pneumothorax Findings are stable     Impression: No acute cardiopulmonary disease, stable                               Findings are consistent with emergency room physician's preliminary reading  Workstation performed: [id number]     Xr Chest Pa & Lateral    Result Date: 9/18/2017  Narrative: CHEST - DUAL ENERGY INDICATION:  Decreased breath sounds on the left  COMPARISON:  Chest x-ray dated August 25, 2017  VIEWS:  PA (including soft tissue/bone algorithms) and lateral projections IMAGES:  5 FINDINGS:     Cardiomediastinal silhouette appears unremarkable  There is a right-sided Port-A-Cath with the tip at the level of the atriocaval junction  There is mild bibasilar atelectasis with no focal infiltrate or consolidation  No pneumothorax or pleural effusion  Visualized osseous structures appear within normal limits for the patient's age  Impression: No active pulmonary disease  Workstation performed: [id number]     Ct Head Without Contrast    Result Date: 10/8/2017  Narrative: CT BRAIN - WITHOUT CONTRAST INDICATION:  CLL  Pain at [employer] of head with tender mass COMPARISON:  September 18, 2017 TECHNIQUE:  CT examination of the brain was performed  In addition to axial images, coronal reformatted images were created and submitted for interpretation  Radiation dose length product (DLP) for this visit:  1026 mGy-cm   This examination, like all CT scans performed in the [facility], was performed utilizing techniques to minimize radiation dose exposure, including the use of iterative reconstruction and automated exposure control  IMAGE QUALITY:  Diagnostic  FINDINGS:  PARENCHYMA: Age-related central atrophy   No intracranial mass, mass effect or midline shift  No CT signs of acute infarction  There is no parenchymal hemorrhage  VENTRICLES AND EXTRA-AXIAL SPACES:  Normal for patient's age  VISUALIZED ORBITS AND PARANASAL SINUSES:  Orbits appear normal  Moderate scattered sinus mucosal thickening is noted  Small amounts of fluid in the sphenoid sinuses  CALVARIUM AND EXTRACRANIAL SOFT TISSUES:  Soft tissue swelling posterior scalp at the vertex  Skin thickening  No focal mass or collection  This appears worse when compared to prior exam      Impression: No acute intracranial abnormality  Worsening Soft tissue swelling posterior scalp at the vertex  No focal mass or collection  Please correlate clinically for infection versus other etiologies including unlikely cutaneous CLL  Findings are consistent with the preliminary report from [employer] which was provided shortly after completion of the exam              Workstation performed: [id number]     Ct Head Wo Contrast    Result Date: 9/18/2017  Narrative: CT BRAIN - WITHOUT CONTRAST INDICATION:  Sudden onset of visual loss COMPARISON:  5/17/2011 TECHNIQUE:  CT examination of the brain was performed  In addition to axial images, coronal reformatted images were created and submitted for interpretation  Radiation dose length product (DLP) for this visit:  1048 mGy-cm   This examination, like all CT scans performed in the [facility], was performed utilizing techniques to minimize radiation dose exposure, including the use of iterative reconstruction and automated exposure control  IMAGE QUALITY:  Diagnostic  FINDINGS:  PARENCHYMA:  No intracranial mass, mass effect or midline shift  No CT signs of acute infarction  There is no parenchymal hemorrhage  Atherosclerotic calcifications evident along the internal carotid arteries  VENTRICLES AND EXTRA-AXIAL SPACES:  Normal for patient's age   VISUALIZED ORBITS AND PARANASAL SINUSES:  No acute findings CALVARIUM AND EXTRACRANIAL SOFT TISSUES:  Small area of swelling noted in the posterior left side of the scalp towards the vertex  No skull fracture seen  Impression: No acute intracranial abnormality  Workstation performed: [id number]     Radiology Results    Result Date: 10/9/2017  Narrative: Ordered by an unspecified provider  Assessment:  Chronic lymphocytic leukemia  History of autoimmune hemolytic anemia, associated with CLL  Pancytopenia due to the treatment as well as CLL  Scalp cellulitis on IV antibiotics which has improved  Plan:  A 58-year-old gentleman with history as described above  He has heavily pretreated CLL  However, his lymphocytosis has been well controlled  He has treatment related neutropenia which is present post him to have cellulitis  Because of the CLL, he has baseline humoral immuno deficiency  I agree with current supportive care with IV antibiotics  I also agree with G-CSF injection daily  He ran out the supportive funding for ibrutinib  He is going to discuss this with Dr Nickolas Steve as outpatient

## 2017-10-09 NOTE — PROGRESS NOTES
Progress Note - Infectious Disease   Katelyn Sero 60 y o  male MRN: [id number]  Unit/Bed#: E2 -01 Encounter: [id number]      Impression/Recommendations:  1  Scalp cellulitis  Given initial presentation with pimple on top of the scalp, there is concern of community-acquired MRSA infection  In addition, with patient having been on multiple antibiotics in the past and having had multiple hospitalization healthcare contacts, he does have significant risk for MRSA colonization  Therefore, I do agree with treating patient with IV vancomycin empirically  His lack of response to p  o  antibiotic is most likely secondary to poor immunological status from CLL on chemotherapy  CT of head without any subcutaneously abscess  Patient is clinically improved on IV vancomycin  Continue IV vancomycin for now  Serial scalp exams  Transition to p  o  antibiotics when patient is clinically improved, perhaps in the next few days      2  Febrile neutropenia  I suspect this is all secondary to above  There is not another obvious source of active infection  Blood cultures are negative so far  Patient appears to be responding to IV vancomycin with resolution of fever  Neutropenia is relatively mild  Therefore, no other antibiotic for now  Admission blood cultures are negative so far  IV vancomycin as in above  No other antibiotic  Monitor temperature/ANC  Follow-up on pending blood cultures      3  HSV esophagitis  Patient completed a course of IV acyclovir last fall with improvement of symptom but persistent esophageal ulceration  I suspect that this will not be able to be eradicated, due to patient's significant immune suppression and being on high-dose prednisone  As long as he is not symptomatic and esophageal ulcer is stable, we can observe for now  Hold off on any further antiviral for now      4  CKD  Creatinine is baseline  Antibiotic dosages adjusted accordingly  Monitor creatinine      5  CLL, on chemotherapy  As stated above, patient's neutropenia is relatively mild  Monitor ANC      Discussed with the patient in detail regarding above plan  Discussed with Dr Ita Monge from Medicine Service  Antibiotics:  Vancomycin # 2    Subjective:  Patient's scalp pain is a little better  Temperature is down  No further chills  He is tolerating antibiotic well  No nausea, vomiting or diarrhea  Objective:  Vitals:  HR:  [71-76] 71  Resp:  [16] 16  BP: (131-142)/(60-76) 142/76  SpO2:  [95 %-98 %] 96 %  Temp (24hrs), Av 6 °F (36 4 °C), Min:97 3 °F (36 3 °C), Max:97 8 °F (36 6 °C)  Current: Temperature: (!) 97 3 °F (36 3 °C)    Physical Exam:     General: Awake, alert, cooperative, no distress  Head:  Scalp with decreased erythema/warmth  No fluctuance  Decreased tenderness  Lungs: Expansion symmetric, no rales, no wheezing, respirations unlabored  Heart[de-identified]  Regular rate and rhythm, S1 and S2 normal, no murmur  Abdomen: Soft, nondistended, non-tender, bowel sounds active all four quadrants,        no masses, no organomegaly  Extremities: No edema  No erythema/warmth  No ulcer  Nontender to palpation  Skin:  No rash  Invasive Devices     Central Venous Catheter Line            Port A Cath 10/07/17 Right Chest 1 day          Peripheral Intravenous Line            Peripheral IV 17 Left;Dorsal (posterior) Hand 21 days                Labs studies:   I have personally reviewed pertinent labs      Results from last 7 days  Lab Units 10/08/17  0546 10/07/17  2336   SODIUM mmol/L 140 139   POTASSIUM mmol/L 3 8 3 4*   CHLORIDE mmol/L 105 103   CO2 mmol/L 28 29   ANION GAP mmol/L 7 7   BUN mg/dL 18 23   CREATININE mg/dL 1 00 1 23   EGFR ml/min/1 73sq m 80 62   GLUCOSE RANDOM mg/dL 154* 136   CALCIUM mg/dL 8 6 9 0   AST U/L 15 16   ALT U/L 19 21   ALK PHOS U/L 27* 36*   TOTAL PROTEIN g/dL 5 4* 5 8*   ALBUMIN g/dL 3 3* 3 6   BILIRUBIN TOTAL mg/dL 0 72 0 65       Results from last 7 days  Lab Units 10/08/17  0546 10/07/17  2336 10/06/17  1057   WBC Thousand/uL 1 33* 1 38* 1 60*   HEMOGLOBIN g/dL 9 7* 10 1* 11 9*   PLATELETS Thousands/uL 95* 93* 108*       Results from last 7 days  Lab Units 10/07/17  2308   BLOOD CULTURE  No Growth at 24 hrs  No Growth at 24 hrs  Imaging Studies:   I have personally reviewed pertinent imaging study reports and images in PACS  EKG, Pathology, and Other Studies:   I have personally reviewed pertinent reports

## 2017-10-09 NOTE — PHYSICIAN ADVISOR
Current patient class: Inpatient  The patient is currently on Hospital Day: 3      The patient was admitted to the hospital  on 10/8/17 at 0141 for the following diagnosis:  Chronic lymphocytic leukemia ([facility] [address] ) [C91 10]  Fever [R50 9]  Anemia, chronic disease [D63 8]  Cellulitis of head or scalp [L03 811]  Leukopenia due to antineoplastic chemotherapy ([facility] [address] ) [D70 1, T45 1X5A]       There is documentation in the medical record of an expected length of stay of at least 2 midnights  The patient is therefore expected to satisfy the 2 midnight benchmark and given the 2 midnight presumption is appropriate for INPATIENT ADMISSION  Given this expectation of a satisfying stay, CMS instructs us that the patient is most often appropriate for inpatient admission under part A provided medical necessity is documented in the chart  After review of the relevant documentation, labs, vital signs and test results, the patient is appropriate for INPATIENT ADMISSION  Admission to the hospital as an inpatient is a complex decision making process which requires the practitioner to consider the patients presenting complaint, history and physical examination and all relevant testing  With this in mind, in this case, the patient was deemed appropriate for INPATIENT ADMISSION  After review of the documentation and testing available at the time of the admission I concur with this clinical determination of medical necessity  The patient does have an inpatient admission within the previous 30 days  The patient was admitted on 9/18/17 and discharged on 9/20/17 as an inpatient  The patient therefore required readmission review  In this case the patient should be considered a SEPARATE and UNRELATED INPATIENT ADMISSION  The patient had been discharged in stable condition with a completed care plan   There were no unresolved acute medical issues at the time of discharged which would have reasonably been expected to prompt this readmission  The patient was found to have blood glucose greater than 500 on routine evaluation  He was symptomatic but did not have ketoacidosis  He was hospitalized in started on an insulin infusion and transition to subcutaneous insulin  The diagnosis was uncontrolled hyperglycemia, steroid induced diabetes  There was no mention of fever or acute infection  Rationale is as follows: The patient is a 60 yrs   Male who presented to the ED at 10/7/2017 10:12 PM with a chief complaint of Fever - 9 weeks to 74 years (patient transported via EMS from home; patient is a CLL patient; patient is complaining of a fever and head pain; patient noted to have a small area of cellulitis with small drainage on top of head; patient stated that it might be the cause of the fever )     The patient is hospitalized with febrile neutropenia  Cultures are pending  He presented also with scalp cellulitis and concern for community acquired MRSA infection  He remains on intravenous vancomycin empirically  This infection has not responded to oral doxycycline  Inpatient level of care is appropriate      The patients vitals on arrival were ED Triage Vitals [10/07/17 2218]   Temperature Pulse Respirations Blood Pressure SpO2   100 4 °F (38 °C) 105 18 137/69 97 %      Temp Source Heart Rate Source Patient Position - Orthostatic VS BP Location FiO2 (%)   Oral Monitor Lying Right arm --      Pain Score       7           Past Medical History:   Diagnosis Date    CAD (coronary artery disease) 2004    CKD (chronic kidney disease) stage 3, GFR 30-59 ml/min     [facility]) [facility]     COPD (chronic obstructive pulmonary disease) ([facility])     Diabetes mellitus ([facility] [address] )     H/O blood clots     Hemolytic anemia ([facility])     History of TIA (transient ischemic attack)     Hyperlipidemia     Hypertension     Multiple gastric ulcers     Myocardial infarction     Recurrent sinusitis     Thrombocytopenia ([facility])     Vertigo      Past Surgical History:   Procedure Laterality Date    ARTHROSCOPIC REPAIR ACL      lt knee    PORTACATH PLACEMENT      MO ESOPHAGOGASTRODUODENOSCOPY TRANSORAL DIAGNOSTIC N/A 10/5/2017    Procedure: ESOPHAGOGASTRODUODENOSCOPY (EGD) with bx;  Surgeon: Ericka Urena DO;  Location: [facility];   Service: Gastroenterology    MO ESOPHAGOSCOPY FLEXIBLE TRANSORAL WITH BIOPSY N/A 9/2/2016    Procedure: ESOPHAGOGASTRODUODENOSCOPY (EGD); ESOPHAGEAL DILATION; ESOPHAGEAL BIOPSY;  Surgeon: Hi Tobin MD;  Location: [facility];  Service: Thoracic    TONSILLECTOMY      UPPER GASTROINTESTINAL ENDOSCOPY      x 6           Consults have been placed to:   IP CONSULT TO INFECTIOUS DISEASES    Vitals:    10/08/17 1110 10/08/17 1448 10/08/17 2300 10/09/17 0730   BP: 129/63 133/60 131/66 142/76   Pulse: 84 76 71 71   Resp: 16 16 16 16   Temp: 98 6 °F (37 °C) 97 6 °F (36 4 °C) 97 8 °F (36 6 °C) (!) 97 3 °F (36 3 °C)   TempSrc: Tympanic Tympanic Tympanic Tympanic   SpO2:  98% 95% 96%   Weight:       Height:           Most recent labs:    Recent Labs      10/07/17   2336  10/08/17   0546   WBC  1 38*  1 33*   HGB  10 1*  9 7*   HCT  31 1*  30 6*   PLT  93*  95*   K  3 4*  3 8   NA  139  140   CALCIUM  9 0  8 6   BUN  23  18   CREATININE  1 23  1 00   INR  1 19*   --    AST  16  15   ALT  21  19   ALKPHOS  36*  27*   BILITOT  0 65  0 72       Scheduled Meds:  al mag oxide-diphenhydramine-lidocaine viscous 10 mL Oral 4x Daily (AC & HS)   aspirin 81 mg Oral Every Other Day   citalopram 40 mg Oral Daily   fenofibrate 145 mg Oral Daily   folic acid 1 mg Oral Daily   gabapentin 100 mg Oral BID   insulin glargine 15 Units Subcutaneous QAM   insulin lispro 1-6 Units Subcutaneous HS   insulin lispro 12 Units Subcutaneous TID With Meals   insulin lispro 4-20 Units Subcutaneous TID AC   pantoprazole 40 mg Oral BID AC   predniSONE 20 mg Oral Daily   sucralfate 500 mg Oral 4x Daily (AC & HS)   vancomycin 10 mg/kg Intravenous Q12H     Continuous Infusions:  sodium chloride 60 mL/hr Last Rate: 60 mL/hr (10/09/17 0436)     PRN Meds:   acetaminophen    benzonatate    HYDROmorphone    LORazepam    Surgical procedures (if appropriate):

## 2017-10-10 ENCOUNTER — GENERIC CONVERSION - ENCOUNTER (OUTPATIENT)
Dept: OTHER | Facility: OTHER | Age: 62
End: 2017-10-10

## 2017-10-10 ENCOUNTER — ALLSCRIPTS OFFICE VISIT (OUTPATIENT)
Dept: OTHER | Facility: OTHER | Age: 62
End: 2017-10-10

## 2017-10-10 LAB
ANION GAP SERPL CALCULATED.3IONS-SCNC: 5 MMOL/L (ref 4–13)
BASOPHILS # BLD MANUAL: 0.02 THOUSAND/UL (ref 0–0.1)
BASOPHILS NFR MAR MANUAL: 1 % (ref 0–1)
BUN SERPL-MCNC: 16 MG/DL (ref 5–25)
CALCIUM SERPL-MCNC: 8.9 MG/DL (ref 8.3–10.1)
CHLORIDE SERPL-SCNC: 107 MMOL/L (ref 100–108)
CO2 SERPL-SCNC: 30 MMOL/L (ref 21–32)
CREAT SERPL-MCNC: 0.86 MG/DL (ref 0.6–1.3)
EOSINOPHIL # BLD MANUAL: 0.02 THOUSAND/UL (ref 0–0.4)
EOSINOPHIL NFR BLD MANUAL: 1 % (ref 0–6)
ERYTHROCYTE [DISTWIDTH] IN BLOOD BY AUTOMATED COUNT: 13.3 % (ref 11.6–15.1)
GFR SERPL CREATININE-BSD FRML MDRD: 92 ML/MIN/1.73SQ M
GLUCOSE SERPL-MCNC: 138 MG/DL (ref 65–140)
GLUCOSE SERPL-MCNC: 229 MG/DL (ref 65–140)
GLUCOSE SERPL-MCNC: 77 MG/DL (ref 65–140)
GLUCOSE SERPL-MCNC: 82 MG/DL (ref 65–140)
GLUCOSE SERPL-MCNC: 95 MG/DL (ref 65–140)
HCT VFR BLD AUTO: 30.7 % (ref 36.5–49.3)
HGB BLD-MCNC: 9.7 G/DL (ref 12–17)
LYMPHOCYTES # BLD AUTO: 0.34 THOUSAND/UL (ref 0.6–4.47)
LYMPHOCYTES # BLD AUTO: 16 % (ref 14–44)
MCH RBC QN AUTO: 29.1 PG (ref 26.8–34.3)
MCHC RBC AUTO-ENTMCNC: 31.6 G/DL (ref 31.4–37.4)
MCV RBC AUTO: 92 FL (ref 82–98)
MONOCYTES # BLD AUTO: 0.3 THOUSAND/UL (ref 0–1.22)
MONOCYTES NFR BLD: 14 % (ref 4–12)
NEUTROPHILS # BLD MANUAL: 1.45 THOUSAND/UL (ref 1.85–7.62)
NEUTS BAND NFR BLD MANUAL: 7 % (ref 0–8)
NEUTS SEG NFR BLD AUTO: 61 % (ref 43–75)
NRBC BLD AUTO-RTO: 0 /100 WBCS
PLATELET # BLD AUTO: 95 THOUSANDS/UL (ref 149–390)
PLATELET BLD QL SMEAR: ABNORMAL
PMV BLD AUTO: 10.5 FL (ref 8.9–12.7)
POTASSIUM SERPL-SCNC: 3.5 MMOL/L (ref 3.5–5.3)
RBC # BLD AUTO: 3.33 MILLION/UL (ref 3.88–5.62)
RBC MORPH BLD: NORMAL
SODIUM SERPL-SCNC: 142 MMOL/L (ref 136–145)
TOTAL CELLS COUNTED SPEC: 100
VANCOMYCIN TROUGH SERPL-MCNC: 8.5 UG/ML (ref 10–20)
WBC # BLD AUTO: 2.13 THOUSAND/UL (ref 4.31–10.16)

## 2017-10-10 PROCEDURE — 85007 BL SMEAR W/DIFF WBC COUNT: CPT | Performed by: INTERNAL MEDICINE

## 2017-10-10 PROCEDURE — 80202 ASSAY OF VANCOMYCIN: CPT | Performed by: INTERNAL MEDICINE

## 2017-10-10 PROCEDURE — 86880 COOMBS TEST DIRECT: CPT

## 2017-10-10 PROCEDURE — 86901 BLOOD TYPING SEROLOGIC RH(D): CPT

## 2017-10-10 PROCEDURE — 82948 REAGENT STRIP/BLOOD GLUCOSE: CPT

## 2017-10-10 PROCEDURE — 85027 COMPLETE CBC AUTOMATED: CPT | Performed by: INTERNAL MEDICINE

## 2017-10-10 PROCEDURE — 86860 RBC ANTIBODY ELUTION: CPT

## 2017-10-10 PROCEDURE — 86870 RBC ANTIBODY IDENTIFICATION: CPT

## 2017-10-10 PROCEDURE — 86905 BLOOD TYPING RBC ANTIGENS: CPT

## 2017-10-10 PROCEDURE — 80048 BASIC METABOLIC PNL TOTAL CA: CPT | Performed by: INTERNAL MEDICINE

## 2017-10-10 PROCEDURE — 86900 BLOOD TYPING SEROLOGIC ABO: CPT

## 2017-10-10 RX ORDER — LORAZEPAM 0.5 MG/1
0.5 TABLET ORAL 2 TIMES DAILY
Status: DISCONTINUED | OUTPATIENT
Start: 2017-10-10 | End: 2017-10-12

## 2017-10-10 RX ADMIN — SUCRALFATE 500 MG: 1 SUSPENSION ORAL at 07:49

## 2017-10-10 RX ADMIN — LIDOCAINE HYDROCHLORIDE 10 ML: 20 SOLUTION ORAL; TOPICAL at 08:09

## 2017-10-10 RX ADMIN — SODIUM CHLORIDE 60 ML/HR: 0.9 INJECTION, SOLUTION INTRAVENOUS at 13:16

## 2017-10-10 RX ADMIN — INSULIN LISPRO 12 UNITS: 100 INJECTION, SOLUTION INTRAVENOUS; SUBCUTANEOUS at 08:07

## 2017-10-10 RX ADMIN — HYDROMORPHONE HYDROCHLORIDE 1 MG: 1 INJECTION, SOLUTION INTRAMUSCULAR; INTRAVENOUS; SUBCUTANEOUS at 05:41

## 2017-10-10 RX ADMIN — LORAZEPAM 0.5 MG: 0.5 TABLET ORAL at 21:15

## 2017-10-10 RX ADMIN — HYDROMORPHONE HYDROCHLORIDE 1 MG: 1 INJECTION, SOLUTION INTRAMUSCULAR; INTRAVENOUS; SUBCUTANEOUS at 21:17

## 2017-10-10 RX ADMIN — SUCRALFATE 500 MG: 1 SUSPENSION ORAL at 17:24

## 2017-10-10 RX ADMIN — GABAPENTIN 100 MG: 100 CAPSULE ORAL at 08:09

## 2017-10-10 RX ADMIN — HYDROMORPHONE HYDROCHLORIDE 1 MG: 1 INJECTION, SOLUTION INTRAMUSCULAR; INTRAVENOUS; SUBCUTANEOUS at 13:10

## 2017-10-10 RX ADMIN — VANCOMYCIN HYDROCHLORIDE 1250 MG: 1 INJECTION, POWDER, LYOPHILIZED, FOR SOLUTION INTRAVENOUS at 17:26

## 2017-10-10 RX ADMIN — INSULIN LISPRO 8 UNITS: 100 INJECTION, SOLUTION INTRAVENOUS; SUBCUTANEOUS at 17:27

## 2017-10-10 RX ADMIN — CITALOPRAM HYDROBROMIDE 40 MG: 20 TABLET ORAL at 08:09

## 2017-10-10 RX ADMIN — FENOFIBRATE 145 MG: 145 TABLET ORAL at 08:09

## 2017-10-10 RX ADMIN — INSULIN LISPRO 12 UNITS: 100 INJECTION, SOLUTION INTRAVENOUS; SUBCUTANEOUS at 17:27

## 2017-10-10 RX ADMIN — PREDNISONE 20 MG: 20 TABLET ORAL at 08:09

## 2017-10-10 RX ADMIN — ASPIRIN 81 MG 81 MG: 81 TABLET ORAL at 08:09

## 2017-10-10 RX ADMIN — TBO-FILGRASTIM 480 MCG: 480 INJECTION, SOLUTION SUBCUTANEOUS at 13:24

## 2017-10-10 RX ADMIN — INSULIN LISPRO 12 UNITS: 100 INJECTION, SOLUTION INTRAVENOUS; SUBCUTANEOUS at 13:16

## 2017-10-10 RX ADMIN — GABAPENTIN 100 MG: 100 CAPSULE ORAL at 17:26

## 2017-10-10 RX ADMIN — PANTOPRAZOLE SODIUM 40 MG: 40 TABLET, DELAYED RELEASE ORAL at 05:47

## 2017-10-10 RX ADMIN — LIDOCAINE HYDROCHLORIDE 10 ML: 20 SOLUTION ORAL; TOPICAL at 21:17

## 2017-10-10 RX ADMIN — LIDOCAINE HYDROCHLORIDE 10 ML: 20 SOLUTION ORAL; TOPICAL at 13:08

## 2017-10-10 RX ADMIN — SUCRALFATE 500 MG: 1 SUSPENSION ORAL at 13:08

## 2017-10-10 RX ADMIN — LIDOCAINE HYDROCHLORIDE 10 ML: 20 SOLUTION ORAL; TOPICAL at 17:26

## 2017-10-10 RX ADMIN — HYDROMORPHONE HYDROCHLORIDE 1 MG: 1 INJECTION, SOLUTION INTRAMUSCULAR; INTRAVENOUS; SUBCUTANEOUS at 17:27

## 2017-10-10 RX ADMIN — SUCRALFATE 500 MG: 1 SUSPENSION ORAL at 21:15

## 2017-10-10 RX ADMIN — FOLIC ACID 1 MG: 1 TABLET ORAL at 08:09

## 2017-10-10 RX ADMIN — VANCOMYCIN HYDROCHLORIDE 1000 MG: 1 INJECTION, SOLUTION INTRAVENOUS at 05:45

## 2017-10-10 RX ADMIN — PANTOPRAZOLE SODIUM 40 MG: 40 TABLET, DELAYED RELEASE ORAL at 17:26

## 2017-10-10 RX ADMIN — INSULIN GLARGINE 15 UNITS: 100 INJECTION, SOLUTION SUBCUTANEOUS at 08:08

## 2017-10-10 NOTE — PLAN OF CARE

## 2017-10-10 NOTE — PROGRESS NOTES
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  Generalized anxiety disorder  Recommendation:  Continue IV vancomycin as per ID team   Eventually transitioned to oral within next 48-72 hours depending on his response to neutropenia/leukopenia  Follow-up on pending cultures  Discussed with Dr Molina Abel from ID and appreciate evaluation done by Dr Carol Mixon  Patient responding favorably to filograstim  Remains nontoxic  Defer further chemotherapy to Dr Janett Lynch the upon discharge  Continue to avoid nephrotoxins drugs and hypertension  Role of IV acyclovir will be deferred to ID team     Subjective:     Patient seen and examined today  EMR and overnight events reviewed  Patient is happy that his chemotherapeutic drugs have been approved free of cost by [facility]  Apparently patient had some financial difficulties and was afraid that he might not be getting his medications  Patient is thankful to the  from the infusion Center who was instrumental in getting his medications approved  He does not have any further fever or chills  Mood is slightly anxious but unremarkable  States that he worries about his wife his daughter and her family  They do not have enough finances  Currently he is denying any episode of dysphagia or any chest pain  Review of Systems     Constitutional: No chills, diaphoresis, fatigue or fever  HEENT: No congestion, sore throat  Mild sensitivity on scalp slightly better from yesterday     Respiratory: No cough, chest tightness, shortness of breath and wheezing  Cardiovascular: No chest pain and palpitations  No Syncope or grey out  GI: Negative for constipation, diarrhea or nausea  Endocrine: Blood sugar is stable  Genitourinary: Negative for dysuria, flank pain and urgency  Skin: Negative for rash  Neurological: Negative for weakness, numbness and headaches  Hematological: Negative for bleeding  Psychiatric: Negative for dysphoric mood, hallucinations  All other systems reviewed and are negative  OBJECTIVE:     Vitals:     HR:  [75-84] 84  Resp:  [18] 18  BP: (134-155)/(63-89) 134/63  SpO2:  [96 %-98 %] 96 %  Temp (24hrs), Av 6 °F (36 4 °C), Min:96 9 °F (36 1 °C), Max:98 1 °F (36 7 °C)  Current: Temperature: 97 7 °F (36 5 °C)    Intake/Output Summary (Last 24 hours) at 10/10/17 194  Last data filed at 10/10/17 1316   Gross per 24 hour   Intake             1576 ml   Output                0 ml   Net             1576 ml     Body mass index is 27 31 kg/m²  Physical Exam    General Appearance:  Awake,alert, cooperative  Pallor / Icterus/ Cyanosis negative  Oropharynx no thrush  Tongue protrudes in midline  Head:  Normocephalic, atraumatic  No open wounds are noted on the scalp, slight puffiness is also improving, there is no palpable induration  There is a small scratch carter which in fact appears to be healed patient states that is where he scratched himself open few days ago  Eyes:  No eye redness or discharge bilaterally  Neck: Supple, no raised JVP  Back:   Symmetric, no kypho-scoliosis  Lungs:   B/L Clear to auscultation, no wheezing or rhonchi  Normal broncho-alveolar air entry  No pleural rubs  Heart:   Regular S1S2, A2P2 normal, no murmur or gallop  Abdomen:   Soft, non-tender,no rebound, bowel sounds+  Musculoskeletal: Extremities no cyanosis or edema  Psych: Normal Affect / No hallucinations or delusions     Neurologic:           Skin:  Endocrine:  Vascular: Awake and alert  Mentation intact  Speech is intact  Facial symmetry is maintained  Good shoulder shrug and hand grasp  Muscle strength is 5/5 in all muscle groups  Light touch and temperature sensation is maintained  No rashes /purpura  No open wounds  No thyromegaly / no lid lag  Homans sign is negative  B/L Calf are supple and non tender  Labs, Imaging, & Other studies:    All pertinent labs and imaging studies were personally reviewed    Results from last 7 days  Lab Units 10/10/17  0540 10/08/17  0546 10/07/17  2336   WBC Thousand/uL 2 13* 1 33* 1 38*   HEMOGLOBIN g/dL 9 7* 9 7* 10 1*   PLATELETS Thousands/uL 95* 95* 93*       Results from last 7 days  Lab Units 10/10/17  0540 10/08/17  0546 10/07/17  2336   SODIUM mmol/L 142 140 139   POTASSIUM mmol/L 3 5 3 8 3 4*   CHLORIDE mmol/L 107 105 103   CO2 mmol/L 30 28 29   ANION GAP mmol/L 5 7 7   BUN mg/dL 16 18 23   CREATININE mg/dL 0 86 1 00 1 23   EGFR ml/min/1 73sq m 92 80 62   GLUCOSE RANDOM mg/dL 95 154* 136   CALCIUM mg/dL 8 9 8 6 9 0   AST U/L  --  15 16   ALT U/L  --  19 21   ALK PHOS U/L  --  27* 36*   TOTAL PROTEIN g/dL  --  5 4* 5 8*   ALBUMIN g/dL  --  3 3* 3 6   BILIRUBIN TOTAL mg/dL  --  0 72 0 65       Results from last 7 days  Lab Units 10/07/17  2308   BLOOD CULTURE  No Growth at 48 hrs  No Growth at 48 hrs         Current Meds:  Current Facility-Administered Medications   Medication Dose Route Frequency Provider Last Rate Last Dose    acetaminophen (TYLENOL) tablet 650 mg  650 mg Oral Q6H PRN Shaw Borrego MD   650 mg at 10/09/17 0435    al mag oxide-diphenhydramine-lidocaine viscous (MAGIC MOUTHWASH) suspension 10 mL  10 mL Oral 4x Daily (AC & HS) Luz Masterson MD   10 mL at 10/10/17 1726    aspirin chewable tablet 81 mg  81 mg Oral Every Other Day Shaw Borrego MD   81 mg at 10/10/17 0809    benzonatate (TESSALON PERLES) capsule 200 mg  200 mg Oral TID PRN Luz Fátima Masterson MD        citalopram (CeleXA) tablet 40 mg  40 mg Oral Daily Luz Navas MD   40 mg at 10/10/17 0809    fenofibrate (TRICOR) tablet 145 mg  145 mg Oral Daily Luz Navas MD   145 mg at 72/36/17 1167    folic acid (FOLVITE) tablet 1 mg  1 mg Oral Daily Luz Navas MD   1 mg at 10/10/17 0809    gabapentin (NEURONTIN) capsule 100 mg  100 mg Oral BID Bailey Sharpe MD   100 mg at 10/10/17 1726    HYDROmorphone (DILAUDID) 1 mg/mL injection 1 mg  1 mg Intravenous Q4H PRN Mayelin Reese DO   1 mg at 10/10/17 1727    insulin glargine (LANTUS) subcutaneous injection 15 Units  15 Units Subcutaneous QAM Bailey Sharpe MD   15 Units at 10/10/17 0808    insulin lispro (HumaLOG) 100 units/mL subcutaneous injection 1-6 Units  1-6 Units Subcutaneous HS Luz Navas MD   1 Units at 10/09/17 2250    insulin lispro (HumaLOG) 100 units/mL subcutaneous injection 12 Units  12 Units Subcutaneous TID With Meals Bailey Sharpe MD   12 Units at 10/10/17 1727    insulin lispro (HumaLOG) 100 units/mL subcutaneous injection 4-20 Units  4-20 Units Subcutaneous TID AC Luz Navas MD   8 Units at 10/10/17 1727    LORazepam (ATIVAN) tablet 0 5 mg  0 5 mg Oral Q8H PRN Luz Navas MD        pantoprazole (PROTONIX) EC tablet 40 mg  40 mg Oral BID AC Luz Navas MD   40 mg at 10/10/17 1726    predniSONE tablet 20 mg  20 mg Oral Daily Luz Navas MD   20 mg at 10/10/17 0809    sodium chloride 0 9 % infusion  60 mL/hr Intravenous Continuous Mayelin Reese DO 60 mL/hr at 10/10/17 1316 60 mL/hr at 10/10/17 1316    sucralfate (CARAFATE) oral suspension 500 mg  500 mg Oral 4x Daily (AC & HS) Luz Navas MD   500 mg at 10/10/17 1724    vancomycin (VANCOCIN) 1,250 mg in sodium chloride 0 9 % 250 mL IVPB  12 5 mg/kg Intravenous Q12H Ronnie Mcrae  7 mL/hr at 10/10/17 1726 1,250 mg at 10/10/17 1726     Home Meds:  Prescriptions Prior to Admission   Medication    aspirin 81 MG tablet    citalopram (CeleXA) 40 mg tablet    doxycycline (ADOXA) 100 MG tablet    fenofibrate (TRIGLIDE) 50 MG tablet    folic acid (FOLVITE) 1 mg tablet    gabapentin (NEURONTIN) 100 mg capsule    Ibrutinib 140 MG CAPS    insulin glargine (LANTUS) 100 units/mL subcutaneous injection    insulin lispro (HumaLOG) 100 units/mL injection    obinutuzumab (GAZYVA) 1000 mg/40 mL SOLN    pantoprazole (PROTONIX) 40 mg tablet    predniSONE 20 mg tablet    al mag oxide-diphenhydramine-lidocaine viscous (MAGIC MOUTHWASH)    Alcohol Swabs PADS    benzonatate (TESSALON) 200 MG capsule    glucose monitoring kit (FREESTYLE) monitoring kit    Lancets (FREESTYLE) lancets    LORazepam (ATIVAN) 0 5 mg tablet    multivitamin (THERAGRAN) TABS    ondansetron (ZOFRAN) 8 mg tablet    sucralfate (CARAFATE) 1 g/10 mL suspension       Continuous Infusions:    sodium chloride 60 mL/hr Last Rate: 60 mL/hr (10/10/17 1316)       Invasive Devices     Central Venous Catheter Line            Port A Cath 10/10/17 Right Chest less than 1 day          Peripheral Intravenous Line            Peripheral IV 09/18/17 Left;Dorsal (posterior) Hand 22 days                Portions of the record may have been created with voice recognition software  Occasional wrong word or "sound a like" substitutions may have occurred due to the inherent limitations of voice recognition software  Read the chart carefully and recognize, using context, where substitutions have occurred

## 2017-10-10 NOTE — PROGRESS NOTES
Progress Note - Infectious Disease   Tiffanie Zepeda 60 y o  male MRN: [id number]  Unit/Bed#: E2 -01 Encounter: [id number]      Impression/Recommendations:  1   Scalp cellulitis  Patient failed PO doxycycline but is dramatically improved on IV vancomycin   CT of head without any subcutaneously abscess  He remains systemically well  Continue IV vancomycin for now  Serial scalp exams  Transition to p  o  antibiotics when patient is clinically improved, perhaps in the next 2-3 days      2   Febrile neutropenia   I suspect this is all secondary to above   There is not another obvious source of active infection   Blood cultures are negative so far   Fever resolved with vancomycin for scalp cellulitis above   Neutropenia is relatively mild   Therefore, no other antibiotic for now   Admission blood cultures are negative so far  IV vancomycin as in above  No other antibiotic  Monitor temperature/ANC  Follow-up on pending blood cultures      3   HSV esophagitis   Patient completed a course of IV acyclovir last fall with improvement of symptom but persistent esophageal ulceration   I suspect that this will not be able to be eradicated, due to patient's significant immune suppression and being on high-dose prednisone   As long as he is not symptomatic and esophageal ulcer is stable, we can observe for now  Hold off on any further antiviral for now      4   CKD   Creatinine is baseline  Antibiotic dosages adjusted accordingly  Monitor creatinine      5   CLL, on chemotherapy   As stated above, patient's neutropenia is relatively mild  Monitor ANC      Discussed with the patient in detail regarding above plan  The   Antibiotics:  Vancomycin # 3     Subjective:  Patient's scalp pain continues to improve  He is requiring less pain medication  Temperature stays down  No further chills  He is tolerating antibiotic well  No nausea, vomiting or diarrhea      Objective:  Vitals:  HR:  [75-81] 79  Resp:  [16-18] 18  BP: (121-155)/(70-89) 155/89  SpO2:  [96 %-98 %] 98 %  Temp (24hrs), Av 4 °F (36 3 °C), Min:96 9 °F (36 1 °C), Max:98 1 °F (36 7 °C)  Current: Temperature: 98 1 °F (36 7 °C)    Physical Exam:     General: Awake, alert, cooperative, no distress  Head:   improved scalp erythema  No further warmth  No drainage  No fluctuance  Mild tenderness  Lungs: Expansion symmetric, no rales, no wheezing, respirations unlabored  Heart[de-identified]  Regular rate and rhythm, S1 and S2 normal, no murmur  Abdomen: Soft, nondistended, non-tender, bowel sounds active all four quadrants,        no masses, no organomegaly  Extremities: No edema  No erythema/warmth  No ulcer  Nontender to palpation  Skin:  No rash  Invasive Devices     Central Venous Catheter Line            Port A Cath 10/07/17 Right Chest 2 days          Peripheral Intravenous Line            Peripheral IV 17 Left;Dorsal (posterior) Hand 22 days                Labs studies:   I have personally reviewed pertinent labs  Results from last 7 days  Lab Units 10/10/17  0540 10/08/17  0546 10/07/17  2336   SODIUM mmol/L 142 140 139   POTASSIUM mmol/L 3 5 3 8 3 4*   CHLORIDE mmol/L 107 105 103   CO2 mmol/L 30 28 29   ANION GAP mmol/L 5 7 7   BUN mg/dL 16 18 23   CREATININE mg/dL 0 86 1 00 1 23   EGFR ml/min/1 73sq m 92 80 62   GLUCOSE RANDOM mg/dL 95 154* 136   CALCIUM mg/dL 8 9 8 6 9 0   AST U/L  --  15 16   ALT U/L  --  19 21   ALK PHOS U/L  --  27* 36*   TOTAL PROTEIN g/dL  --  5 4* 5 8*   ALBUMIN g/dL  --  3 3* 3 6   BILIRUBIN TOTAL mg/dL  --  0 72 0 65       Results from last 7 days  Lab Units 10/10/17  0540 10/08/17  0546 10/07/17  2336   WBC Thousand/uL 2 13* 1 33* 1 38*   HEMOGLOBIN g/dL 9 7* 9 7* 10 1*   PLATELETS Thousands/uL 95* 95* 93*       Results from last 7 days  Lab Units 10/07/17  2308   BLOOD CULTURE  No Growth at 48 hrs  No Growth at 48 hrs         Imaging Studies:   I have personally reviewed pertinent imaging study reports and images in PACS  EKG, Pathology, and Other Studies:   I have personally reviewed pertinent reports

## 2017-10-11 LAB
ANION GAP SERPL CALCULATED.3IONS-SCNC: 5 MMOL/L (ref 4–13)
BASOPHILS # BLD MANUAL: 0.03 THOUSAND/UL (ref 0–0.1)
BASOPHILS NFR MAR MANUAL: 1 % (ref 0–1)
BLOOD GROUP ANTIBODIES SERPL: NORMAL
BUN SERPL-MCNC: 18 MG/DL (ref 5–25)
CALCIUM SERPL-MCNC: 9.1 MG/DL (ref 8.3–10.1)
CHLORIDE SERPL-SCNC: 107 MMOL/L (ref 100–108)
CO2 SERPL-SCNC: 30 MMOL/L (ref 21–32)
CREAT SERPL-MCNC: 1.01 MG/DL (ref 0.6–1.3)
EOSINOPHIL # BLD MANUAL: 0.16 THOUSAND/UL (ref 0–0.4)
EOSINOPHIL NFR BLD MANUAL: 6 % (ref 0–6)
ERYTHROCYTE [DISTWIDTH] IN BLOOD BY AUTOMATED COUNT: 13.3 % (ref 11.6–15.1)
GFR SERPL CREATININE-BSD FRML MDRD: 79 ML/MIN/1.73SQ M
GLUCOSE SERPL-MCNC: 133 MG/DL (ref 65–140)
GLUCOSE SERPL-MCNC: 218 MG/DL (ref 65–140)
GLUCOSE SERPL-MCNC: 258 MG/DL (ref 65–140)
GLUCOSE SERPL-MCNC: 80 MG/DL (ref 65–140)
GLUCOSE SERPL-MCNC: 82 MG/DL (ref 65–140)
GLUCOSE SERPL-MCNC: 87 MG/DL (ref 65–140)
HCT VFR BLD AUTO: 28.5 % (ref 36.5–49.3)
HGB BLD-MCNC: 9 G/DL (ref 12–17)
LYMPHOCYTES # BLD AUTO: 0.75 THOUSAND/UL (ref 0.6–4.47)
LYMPHOCYTES # BLD AUTO: 28 % (ref 14–44)
MCH RBC QN AUTO: 29.1 PG (ref 26.8–34.3)
MCHC RBC AUTO-ENTMCNC: 31.6 G/DL (ref 31.4–37.4)
MCV RBC AUTO: 92 FL (ref 82–98)
MONOCYTES # BLD AUTO: 0.24 THOUSAND/UL (ref 0–1.22)
MONOCYTES NFR BLD: 9 % (ref 4–12)
MYELOCYTES NFR BLD MANUAL: 1 % (ref 0–1)
NEUTROPHILS # BLD MANUAL: 1.47 THOUSAND/UL (ref 1.85–7.62)
NEUTS BAND NFR BLD MANUAL: 8 % (ref 0–8)
NEUTS SEG NFR BLD AUTO: 47 % (ref 43–75)
NRBC BLD AUTO-RTO: 2 /100 WBC (ref 0–2)
NRBC BLD AUTO-RTO: 2 /100 WBCS
PLATELET # BLD AUTO: 71 THOUSANDS/UL (ref 149–390)
PLATELET BLD QL SMEAR: ABNORMAL
PMV BLD AUTO: 10.8 FL (ref 8.9–12.7)
POTASSIUM SERPL-SCNC: 3.2 MMOL/L (ref 3.5–5.3)
RBC # BLD AUTO: 3.09 MILLION/UL (ref 3.88–5.62)
RBC MORPH BLD: NORMAL
SODIUM SERPL-SCNC: 142 MMOL/L (ref 136–145)
TOTAL CELLS COUNTED SPEC: 100
WBC # BLD AUTO: 2.67 THOUSAND/UL (ref 4.31–10.16)

## 2017-10-11 PROCEDURE — 85007 BL SMEAR W/DIFF WBC COUNT: CPT | Performed by: INTERNAL MEDICINE

## 2017-10-11 PROCEDURE — 80048 BASIC METABOLIC PNL TOTAL CA: CPT | Performed by: INTERNAL MEDICINE

## 2017-10-11 PROCEDURE — 82948 REAGENT STRIP/BLOOD GLUCOSE: CPT

## 2017-10-11 PROCEDURE — 85027 COMPLETE CBC AUTOMATED: CPT | Performed by: INTERNAL MEDICINE

## 2017-10-11 RX ORDER — POTASSIUM CHLORIDE 20 MEQ/1
40 TABLET, EXTENDED RELEASE ORAL ONCE
Status: COMPLETED | OUTPATIENT
Start: 2017-10-11 | End: 2017-10-11

## 2017-10-11 RX ADMIN — HYDROMORPHONE HYDROCHLORIDE 1 MG: 1 INJECTION, SOLUTION INTRAMUSCULAR; INTRAVENOUS; SUBCUTANEOUS at 01:36

## 2017-10-11 RX ADMIN — PREDNISONE 20 MG: 20 TABLET ORAL at 09:26

## 2017-10-11 RX ADMIN — PANTOPRAZOLE SODIUM 40 MG: 40 TABLET, DELAYED RELEASE ORAL at 06:00

## 2017-10-11 RX ADMIN — POTASSIUM CHLORIDE 40 MEQ: 1500 TABLET, EXTENDED RELEASE ORAL at 11:55

## 2017-10-11 RX ADMIN — INSULIN LISPRO 12 UNITS: 100 INJECTION, SOLUTION INTRAVENOUS; SUBCUTANEOUS at 11:56

## 2017-10-11 RX ADMIN — SUCRALFATE 500 MG: 1 SUSPENSION ORAL at 21:41

## 2017-10-11 RX ADMIN — LIDOCAINE HYDROCHLORIDE 10 ML: 20 SOLUTION ORAL; TOPICAL at 06:00

## 2017-10-11 RX ADMIN — INSULIN GLARGINE 15 UNITS: 100 INJECTION, SOLUTION SUBCUTANEOUS at 09:25

## 2017-10-11 RX ADMIN — FOLIC ACID 1 MG: 1 TABLET ORAL at 09:26

## 2017-10-11 RX ADMIN — PANTOPRAZOLE SODIUM 40 MG: 40 TABLET, DELAYED RELEASE ORAL at 17:22

## 2017-10-11 RX ADMIN — SUCRALFATE 500 MG: 1 SUSPENSION ORAL at 11:55

## 2017-10-11 RX ADMIN — SUCRALFATE 500 MG: 1 SUSPENSION ORAL at 17:21

## 2017-10-11 RX ADMIN — GABAPENTIN 100 MG: 100 CAPSULE ORAL at 17:22

## 2017-10-11 RX ADMIN — LIDOCAINE HYDROCHLORIDE 10 ML: 20 SOLUTION ORAL; TOPICAL at 17:20

## 2017-10-11 RX ADMIN — INSULIN LISPRO 12 UNITS: 100 INJECTION, SOLUTION INTRAVENOUS; SUBCUTANEOUS at 09:25

## 2017-10-11 RX ADMIN — VANCOMYCIN HYDROCHLORIDE 1250 MG: 1 INJECTION, POWDER, LYOPHILIZED, FOR SOLUTION INTRAVENOUS at 05:37

## 2017-10-11 RX ADMIN — VANCOMYCIN HYDROCHLORIDE 1250 MG: 1 INJECTION, POWDER, LYOPHILIZED, FOR SOLUTION INTRAVENOUS at 17:34

## 2017-10-11 RX ADMIN — FENOFIBRATE 145 MG: 145 TABLET ORAL at 09:26

## 2017-10-11 RX ADMIN — INSULIN LISPRO 2 UNITS: 100 INJECTION, SOLUTION INTRAVENOUS; SUBCUTANEOUS at 21:42

## 2017-10-11 RX ADMIN — SUCRALFATE 500 MG: 1 SUSPENSION ORAL at 06:00

## 2017-10-11 RX ADMIN — HYDROMORPHONE HYDROCHLORIDE 1 MG: 1 INJECTION, SOLUTION INTRAMUSCULAR; INTRAVENOUS; SUBCUTANEOUS at 05:37

## 2017-10-11 RX ADMIN — LIDOCAINE HYDROCHLORIDE 10 ML: 20 SOLUTION ORAL; TOPICAL at 21:42

## 2017-10-11 RX ADMIN — INSULIN LISPRO 12 UNITS: 100 INJECTION, SOLUTION INTRAVENOUS; SUBCUTANEOUS at 17:24

## 2017-10-11 RX ADMIN — SODIUM CHLORIDE 60 ML/HR: 0.9 INJECTION, SOLUTION INTRAVENOUS at 09:35

## 2017-10-11 RX ADMIN — HYDROMORPHONE HYDROCHLORIDE 1 MG: 1 INJECTION, SOLUTION INTRAMUSCULAR; INTRAVENOUS; SUBCUTANEOUS at 21:42

## 2017-10-11 RX ADMIN — INSULIN LISPRO 8 UNITS: 100 INJECTION, SOLUTION INTRAVENOUS; SUBCUTANEOUS at 17:23

## 2017-10-11 RX ADMIN — HYDROMORPHONE HYDROCHLORIDE 1 MG: 1 INJECTION, SOLUTION INTRAMUSCULAR; INTRAVENOUS; SUBCUTANEOUS at 09:37

## 2017-10-11 RX ADMIN — HYDROMORPHONE HYDROCHLORIDE 1 MG: 1 INJECTION, SOLUTION INTRAMUSCULAR; INTRAVENOUS; SUBCUTANEOUS at 17:23

## 2017-10-11 RX ADMIN — ACETAMINOPHEN 650 MG: 325 TABLET, FILM COATED ORAL at 03:06

## 2017-10-11 RX ADMIN — CITALOPRAM HYDROBROMIDE 40 MG: 20 TABLET ORAL at 09:26

## 2017-10-11 RX ADMIN — GABAPENTIN 100 MG: 100 CAPSULE ORAL at 09:26

## 2017-10-11 RX ADMIN — LORAZEPAM 0.5 MG: 0.5 TABLET ORAL at 17:22

## 2017-10-11 RX ADMIN — LIDOCAINE HYDROCHLORIDE 10 ML: 20 SOLUTION ORAL; TOPICAL at 11:55

## 2017-10-11 RX ADMIN — LORAZEPAM 0.5 MG: 0.5 TABLET ORAL at 09:27

## 2017-10-11 NOTE — PROGRESS NOTES
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  Generalized anxiety disorder  Hypokalemia      Recommendation:    Continue Celexa 40 mg daily, Tricor 758 mg daily, folic acid 1 mg daily, Neurontin 100 mg b i d   Lantus 15 units subcutaneously daily at bedtime-controlling blood sugars well with current regimen  Continue sliding scale insulin coverage a c  and HS  Maintained on prednisone 20 mg daily with gastric prophylaxis with Protonix 40 mg b i d  secondary to underlying autoimmune hemolytic anemia  Pancytopenia with slight improvement after getting filgrastim subcu 40 mcg to injections  Intravenous vancomycin 1250 mg q 12 hours as recommended by Infectious Disease team   Recommend monitoring renal functions due to his underlying mild chronic insufficiency - will order vancomycin troughs  Subjective:     Patient seen and examined today  EMR and overnight events reviewed  Patient is sitting in his bed, he has ice pack on his scalp states that he feels form and thus pain  Pain is well localized on the top of his scalp, there is no redness or drainage  Patient tends to scratch his scalp also tries to demonstrate how he picked on his itchy scalp 1 time and that created this problem  His blood sugars are well controlled  He does not have any GI symptoms  Currently states that he has no dysphagia and is doing well with Protonix and with Magic mouthwash  Review of Systems     Constitutional: Denies appetite change   No chills, diaphoresis, fatigue or fever  HEENT: No congestion, sore throat  No visual complaints  Respiratory: No cough, chest tightness, shortness of breath and wheezing  Cardiovascular: No chest pain and palpitations  No Syncope or grey out  GI: Negative for abdominal distention, pain, constipation, diarrhea or nausea  Endocrine: Blood sugars are controlled, steroid induced hyperglycemia  Genitourinary: Negative for decreased urine volume, dysuria, flank pain and urgency  Skin: Negative for rash  Neurological: Negative for dizziness, weakness, numbness and headaches  Hematological: Negative for spontaneous bruising or bleeding  Psychiatric: Slight anxiety  All other systems reviewed and are negative  OBJECTIVE:     Vitals:     HR:  [75-85] 76  Resp:  [18-20] 18  BP: (134-148)/(63-94) 134/80  SpO2:  [93 %-97 %] 93 %  Temp (24hrs), Av 3 °F (36 3 °C), Min:96 3 °F (35 7 °C), Max:97 8 °F (36 6 °C)  Current: Temperature: (!) 96 3 °F (35 7 °C)    Intake/Output Summary (Last 24 hours) at 10/11/17 1056  Last data filed at 10/11/17 0935   Gross per 24 hour   Intake            1 ml   Output                0 ml   Net            1 ml     Body mass index is 27 31 kg/m²  Physical Exam    General Appearance:  Awake,alert, cooperative  Not in distress  Pallor / Icterus/ Cyanosis negative  Oropharynx no thrush  Tongue protrudes in midline  Head:  Normocephalic, atraumatic  No titubations  Mild alopecia, mild subcutaneous puffiness without any induration, no discharge or drainage, skin is dry shiny, this is a well healed scratch carter  This is approximately 1 cm long  Eyes:  No eye redness or discharge bilaterally  Neck: Supple, no raised JVP  Back:   Symmetric, no kypho-scoliosis  Lungs:   B/L Clear to auscultation, no wheezing or rhonchi  Normal broncho-alveolar air entry  No pleural rubs  Heart:   Regular S1S2, A2P2 normal, no murmur or gallop      Abdomen: Soft, non-tender,no rebound, bowel sounds+  Musculoskeletal: Extremities no cyanosis or edema  Psych: Normal Affect / No hallucinations or delusions  Neurologic:           Skin:    Endocrine:  Vascular: Awake and alert  Mentation intact  Speech is intact  Facial symmetry is maintained  Good shoulder shrug and hand grasp  Muscle strength is 5/5 in all muscle groups  Light touch and temperature sensation is maintained  No rashes /purpura  No open wounds except on scalp as above  No thyromegaly / no lid lag  Homans sign is negative  B/L Calf are supple and non tender  Labs, Imaging, & Other studies:    All pertinent labs and imaging studies were personally reviewed    Results from last 7 days  Lab Units 10/11/17  0547 10/10/17  0540 10/08/17  0546   WBC Thousand/uL 2 67* 2 13* 1 33*   HEMOGLOBIN g/dL 9 0* 9 7* 9 7*   PLATELETS Thousands/uL 71* 95* 95*       Results from last 7 days  Lab Units 10/11/17  0547 10/10/17  0540 10/08/17  0546 10/07/17  2336   SODIUM mmol/L 142 142 140 139   POTASSIUM mmol/L 3 2* 3 5 3 8 3 4*   CHLORIDE mmol/L 107 107 105 103   CO2 mmol/L 30 30 28 29   ANION GAP mmol/L 5 5 7 7   BUN mg/dL 18 16 18 23   CREATININE mg/dL 1 01 0 86 1 00 1 23   EGFR ml/min/1 73sq m 79 92 80 62   GLUCOSE RANDOM mg/dL 87 95 154* 136   CALCIUM mg/dL 9 1 8 9 8 6 9 0   AST U/L  --   --  15 16   ALT U/L  --   --  19 21   ALK PHOS U/L  --   --  27* 36*   TOTAL PROTEIN g/dL  --   --  5 4* 5 8*   ALBUMIN g/dL  --   --  3 3* 3 6   BILIRUBIN TOTAL mg/dL  --   --  0 72 0 65       Results from last 7 days  Lab Units 10/07/17  2308   BLOOD CULTURE  No Growth at 48 hrs  No Growth at 48 hrs         Current Meds:  Current Facility-Administered Medications   Medication Dose Route Frequency Provider Last Rate Last Dose    acetaminophen (TYLENOL) tablet 650 mg  650 mg Oral Q6H PRN Ynes Doran MD   650 mg at 10/11/17 0306    al mag oxide-diphenhydramine-lidocaine viscous (MAGIC MOUTHWASH) suspension 10 mL 10 mL Oral 4x Daily (AC & HS) Luz Huitron MD   10 mL at 10/11/17 0600    aspirin chewable tablet 81 mg  81 mg Oral Every Other Day Andreia Rosas MD   81 mg at 10/10/17 0809    benzonatate (TESSALON PERLES) capsule 200 mg  200 mg Oral TID PRN Andreia Rosas MD        citalopram (CeleXA) tablet 40 mg  40 mg Oral Daily Luz Huitron MD   40 mg at 10/11/17 0926    fenofibrate (TRICOR) tablet 145 mg  145 mg Oral Daily Andreia Rosas MD   145 mg at 14/95/10 7951    folic acid (FOLVITE) tablet 1 mg  1 mg Oral Daily Luz Huitron MD   1 mg at 10/11/17 0926    gabapentin (NEURONTIN) capsule 100 mg  100 mg Oral BID Andreia Rosas MD   100 mg at 10/11/17 0926    HYDROmorphone (DILAUDID) 1 mg/mL injection 1 mg  1 mg Intravenous Q4H PRN Mayelin Reese DO   1 mg at 10/11/17 0937    insulin glargine (LANTUS) subcutaneous injection 15 Units  15 Units Subcutaneous QAM Andreia Rosas MD   15 Units at 10/11/17 0925    insulin lispro (HumaLOG) 100 units/mL subcutaneous injection 1-6 Units  1-6 Units Subcutaneous HS Luz Huitron MD   1 Units at 10/09/17 2250    insulin lispro (HumaLOG) 100 units/mL subcutaneous injection 12 Units  12 Units Subcutaneous TID With Meals Andreia Rosas MD   12 Units at 10/11/17 0925    insulin lispro (HumaLOG) 100 units/mL subcutaneous injection 4-20 Units  4-20 Units Subcutaneous TID AC Luz Huitron MD   8 Units at 10/10/17 1727    LORazepam (ATIVAN) tablet 0 5 mg  0 5 mg Oral Q8H PRN Luz Huitron MD        LORazepam (ATIVAN) tablet 0 5 mg  0 5 mg Oral BID Merlyn Cisneros MD   0 5 mg at 10/11/17 0927    pantoprazole (PROTONIX) EC tablet 40 mg  40 mg Oral BID AC Luz Huitron MD   40 mg at 10/11/17 0600    potassium chloride (K-DUR,KLOR-CON) CR tablet 40 mEq  40 mEq Oral Once Merlyn Cisneros MD        predniSONE tablet 20 mg  20 mg Oral Daily Luz Kilo Huitron MD   20 mg at 10/11/17 0926    sodium chloride 0 9 % infusion 60 mL/hr Intravenous Continuous Mayelin Reese, DO 60 mL/hr at 10/11/17 0935 60 mL/hr at 10/11/17 0935    sucralfate (CARAFATE) oral suspension 500 mg  500 mg Oral 4x Daily (AC & HS) Luz Keo Johnson MD   500 mg at 10/11/17 0600    vancomycin (VANCOCIN) 1,250 mg in sodium chloride 0 9 % 250 mL IVPB  12 5 mg/kg Intravenous Q12H Mary Capps MD   Stopped at 10/11/17 0707     Home Meds:  Prescriptions Prior to Admission   Medication    aspirin 81 MG tablet    citalopram (CeleXA) 40 mg tablet    doxycycline (ADOXA) 100 MG tablet    fenofibrate (TRIGLIDE) 50 MG tablet    folic acid (FOLVITE) 1 mg tablet    gabapentin (NEURONTIN) 100 mg capsule    Ibrutinib 140 MG CAPS    insulin glargine (LANTUS) 100 units/mL subcutaneous injection    insulin lispro (HumaLOG) 100 units/mL injection    obinutuzumab (GAZYVA) 1000 mg/40 mL SOLN    pantoprazole (PROTONIX) 40 mg tablet    predniSONE 20 mg tablet    al mag oxide-diphenhydramine-lidocaine viscous (MAGIC MOUTHWASH)    Alcohol Swabs PADS    benzonatate (TESSALON) 200 MG capsule    glucose monitoring kit (FREESTYLE) monitoring kit    Lancets (FREESTYLE) lancets    LORazepam (ATIVAN) 0 5 mg tablet    multivitamin (THERAGRAN) TABS    ondansetron (ZOFRAN) 8 mg tablet    sucralfate (CARAFATE) 1 g/10 mL suspension       Continuous Infusions:    sodium chloride 60 mL/hr Last Rate: 60 mL/hr (10/11/17 0935)       Invasive Devices     Central Venous Catheter Line            Port A Cath 10/10/17 Right Chest less than 1 day          Peripheral Intravenous Line            Peripheral IV 09/18/17 Left;Dorsal (posterior) Hand 22 days                Portions of the record may have been created with voice recognition software  Occasional wrong word or "sound a like" substitutions may have occurred due to the inherent limitations of voice recognition software  Read the chart carefully and recognize, using context, where substitutions have occurred

## 2017-10-11 NOTE — PROGRESS NOTES
Progress Note - Infectious Disease   Donato Singh 60 y o  male MRN: [id number]  Unit/Bed#: E2 -01 Encounter: [id number]      Impression/Recommendations:  1   Scalp cellulitis  Patient failed PO doxycycline but is dramatically improved on IV vancomycin   CT of head without any subcutaneously abscess  He remains systemically well  Continue IV vancomycin for now  Serial scalp exams  Transition to p  o  antibiotics when patient is clinically improved, in the next 1-2 days      2   Febrile neutropenia   I suspect this is all secondary to above   There is not another obvious source of active infection   Blood cultures are negative so far   Fever resolved with vancomycin for scalp cellulitis above   Neutropenia has resolved, with ANC now > 1000   Therefore, no other antibiotic for now  [facility] blood cultures are negative so far  IV vancomycin as in above  No other antibiotic  Monitor temperature/ANC  Follow-up on pending blood cultures      3   HSV esophagitis   Patient completed a course of IV acyclovir last fall with improvement of symptom but persistent esophageal ulceration   I suspect that this will not be able to be eradicated, due to patient's significant immune suppression and being on high-dose prednisone   As long as he is not symptomatic and esophageal ulcer is stable, we can observe for now  Hold off on any further antiviral for now      4   CKD   Creatinine is baseline  Antibiotic dosages adjusted accordingly  Monitor creatinine      5   CLL, on chemotherapy   As stated above, patient's neutropenia is relatively mild  Monitor ANC      Discussed with the patient in detail regarding above plan  Discussed with Dr Monisha Bashir from Medicine service earlier      Antibiotics:  Vancomycin # 4     Subjective:  Patient's scalp pain continues to improve slowly  Temperature stays down   No further chills    He is tolerating antibiotic well   No nausea, vomiting or diarrhea  Objective:  Vitals:  HR:  [75-85] 76  Resp:  [18-20] 18  BP: (134-148)/(63-94) 134/80  SpO2:  [93 %-97 %] 93 %  Temp (24hrs), Av 3 °F (36 3 °C), Min:96 3 °F (35 7 °C), Max:97 8 °F (36 6 °C)  Current: Temperature: (!) 96 3 °F (35 7 °C)    Physical Exam:     General: Awake, alert, cooperative, no distress  Head:  Resolved scalp erythema/warmth  No fluctuance  Improved tenderness  Lungs: Expansion symmetric, no rales, no wheezing, respirations unlabored  Heart[de-identified]  Regular rate and rhythm, S1 and S2 normal, no murmur  Abdomen: Soft, nondistended, non-tender, bowel sounds active all four quadrants,        no masses, no organomegaly  Extremities: No edema  No erythema/warmth  No ulcer  Nontender to palpation  Skin:  No rash  Invasive Devices     Central Venous Catheter Line            Port A Cath 10/10/17 Right Chest less than 1 day          Peripheral Intravenous Line            Peripheral IV 17 Left;Dorsal (posterior) Hand 23 days                Labs studies:   I have personally reviewed pertinent labs      Results from last 7 days  Lab Units 10/11/17  0547 10/10/17  0540 10/08/17  0546 10/07/17  2336   SODIUM mmol/L 142 142 140 139   POTASSIUM mmol/L 3 2* 3 5 3 8 3 4*   CHLORIDE mmol/L 107 107 105 103   CO2 mmol/L 30 30 28 29   ANION GAP mmol/L 5 5 7 7   BUN mg/dL 18 16 18 23   CREATININE mg/dL 1 01 0 86 1 00 1 23   EGFR ml/min/1 73sq m 79 92 80 62   GLUCOSE RANDOM mg/dL 87 95 154* 136   CALCIUM mg/dL 9 1 8 9 8 6 9 0   AST U/L  --   --  15 16   ALT U/L  --   --  19 21   ALK PHOS U/L  --   --  27* 36*   TOTAL PROTEIN g/dL  --   --  5 4* 5 8*   ALBUMIN g/dL  --   --  3 3* 3 6   BILIRUBIN TOTAL mg/dL  --   --  0 72 0 65       Results from last 7 days  Lab Units 10/11/17  0547 10/10/17  0540 10/08/17  0546   WBC Thousand/uL 2 67* 2 13* 1 33*   HEMOGLOBIN g/dL 9 0* 9 7* 9 7*   PLATELETS Thousands/uL 71* 95* 95*       Results from last 7 days  Lab Units 10/07/17  9018   BLOOD CULTURE No Growth at 72 hrs  No Growth at 72 hrs  Imaging Studies:   I have personally reviewed pertinent imaging study reports and images in PACS  EKG, Pathology, and Other Studies:   I have personally reviewed pertinent reports

## 2017-10-11 NOTE — SOCIAL WORK
CM met with patient at bedside to address discharge needs  Patient reports living in a three story house with spouse and family  Patient is independent with ADLs and functional mobility  Patient denies use of DME  Patient reports a hx of being active with [facility] and will be agreeable to services with agency if recommended  Patient drives; reports he will have a ride at discharge  POA identified as patient's spouse Belen Balderrama  PCP identified  Prescription coverage identified; patient made aware of [address] to use at discharge if needed  Patient made aware of CM's name and role  No other needs at present  CM to follow as needed

## 2017-10-12 LAB
ANION GAP SERPL CALCULATED.3IONS-SCNC: 6 MMOL/L (ref 4–13)
ANISOCYTOSIS BLD QL SMEAR: PRESENT
BASOPHILS # BLD MANUAL: 0.02 THOUSAND/UL (ref 0–0.1)
BASOPHILS NFR MAR MANUAL: 1 % (ref 0–1)
BUN SERPL-MCNC: 16 MG/DL (ref 5–25)
CALCIUM SERPL-MCNC: 9.1 MG/DL (ref 8.3–10.1)
CHLORIDE SERPL-SCNC: 107 MMOL/L (ref 100–108)
CO2 SERPL-SCNC: 31 MMOL/L (ref 21–32)
CREAT SERPL-MCNC: 0.97 MG/DL (ref 0.6–1.3)
EOSINOPHIL # BLD MANUAL: 0.12 THOUSAND/UL (ref 0–0.4)
EOSINOPHIL NFR BLD MANUAL: 5 % (ref 0–6)
ERYTHROCYTE [DISTWIDTH] IN BLOOD BY AUTOMATED COUNT: 13.5 % (ref 11.6–15.1)
GFR SERPL CREATININE-BSD FRML MDRD: 83 ML/MIN/1.73SQ M
GLUCOSE SERPL-MCNC: 103 MG/DL (ref 65–140)
GLUCOSE SERPL-MCNC: 169 MG/DL (ref 65–140)
GLUCOSE SERPL-MCNC: 196 MG/DL (ref 65–140)
GLUCOSE SERPL-MCNC: 92 MG/DL (ref 65–140)
GLUCOSE SERPL-MCNC: 96 MG/DL (ref 65–140)
GLUCOSE SERPL-MCNC: 99 MG/DL (ref 65–140)
HCT VFR BLD AUTO: 28.9 % (ref 36.5–49.3)
HGB BLD-MCNC: 9 G/DL (ref 12–17)
LYMPHOCYTES # BLD AUTO: 0.49 THOUSAND/UL (ref 0.6–4.47)
LYMPHOCYTES # BLD AUTO: 20 % (ref 14–44)
MCH RBC QN AUTO: 28.8 PG (ref 26.8–34.3)
MCHC RBC AUTO-ENTMCNC: 31.1 G/DL (ref 31.4–37.4)
MCV RBC AUTO: 93 FL (ref 82–98)
METAMYELOCYTES NFR BLD MANUAL: 1 % (ref 0–1)
MONOCYTES # BLD AUTO: 0.25 THOUSAND/UL (ref 0–1.22)
MONOCYTES NFR BLD: 10 % (ref 4–12)
MYELOCYTES NFR BLD MANUAL: 1 % (ref 0–1)
NEUTROPHILS # BLD MANUAL: 1.52 THOUSAND/UL (ref 1.85–7.62)
NEUTS BAND NFR BLD MANUAL: 4 % (ref 0–8)
NEUTS SEG NFR BLD AUTO: 58 % (ref 43–75)
NRBC BLD AUTO-RTO: 1 /100 WBC (ref 0–2)
NRBC BLD AUTO-RTO: 1 /100 WBCS
PLATELET # BLD AUTO: 63 THOUSANDS/UL (ref 149–390)
PLATELET BLD QL SMEAR: ABNORMAL
PMV BLD AUTO: 11.2 FL (ref 8.9–12.7)
POLYCHROMASIA BLD QL SMEAR: PRESENT
POTASSIUM SERPL-SCNC: 3.5 MMOL/L (ref 3.5–5.3)
RBC # BLD AUTO: 3.12 MILLION/UL (ref 3.88–5.62)
SODIUM SERPL-SCNC: 144 MMOL/L (ref 136–145)
TOTAL CELLS COUNTED SPEC: 100
VANCOMYCIN TROUGH SERPL-MCNC: 12.7 UG/ML (ref 10–20)
WBC # BLD AUTO: 2.45 THOUSAND/UL (ref 4.31–10.16)

## 2017-10-12 PROCEDURE — 82948 REAGENT STRIP/BLOOD GLUCOSE: CPT

## 2017-10-12 PROCEDURE — 85007 BL SMEAR W/DIFF WBC COUNT: CPT | Performed by: INTERNAL MEDICINE

## 2017-10-12 PROCEDURE — 85027 COMPLETE CBC AUTOMATED: CPT | Performed by: INTERNAL MEDICINE

## 2017-10-12 PROCEDURE — 80048 BASIC METABOLIC PNL TOTAL CA: CPT | Performed by: INTERNAL MEDICINE

## 2017-10-12 PROCEDURE — 80202 ASSAY OF VANCOMYCIN: CPT | Performed by: INTERNAL MEDICINE

## 2017-10-12 RX ORDER — LORAZEPAM 1 MG/1
1 TABLET ORAL 2 TIMES DAILY
Status: DISCONTINUED | OUTPATIENT
Start: 2017-10-12 | End: 2017-10-14 | Stop reason: HOSPADM

## 2017-10-12 RX ORDER — OXYCODONE HYDROCHLORIDE AND ACETAMINOPHEN 5; 325 MG/1; MG/1
1 TABLET ORAL EVERY 4 HOURS PRN
Status: DISCONTINUED | OUTPATIENT
Start: 2017-10-12 | End: 2017-10-14 | Stop reason: HOSPADM

## 2017-10-12 RX ORDER — SUCRALFATE ORAL 1 G/10ML
1000 SUSPENSION ORAL
Status: DISCONTINUED | OUTPATIENT
Start: 2017-10-12 | End: 2017-10-14 | Stop reason: HOSPADM

## 2017-10-12 RX ORDER — GABAPENTIN 100 MG/1
100 CAPSULE ORAL 3 TIMES DAILY
Status: DISCONTINUED | OUTPATIENT
Start: 2017-10-12 | End: 2017-10-14 | Stop reason: HOSPADM

## 2017-10-12 RX ADMIN — SUCRALFATE 500 MG: 1 SUSPENSION ORAL at 11:34

## 2017-10-12 RX ADMIN — ACETAMINOPHEN 650 MG: 325 TABLET, FILM COATED ORAL at 03:36

## 2017-10-12 RX ADMIN — PREDNISONE 20 MG: 20 TABLET ORAL at 08:40

## 2017-10-12 RX ADMIN — ASPIRIN 81 MG 81 MG: 81 TABLET ORAL at 08:39

## 2017-10-12 RX ADMIN — INSULIN GLARGINE 15 UNITS: 100 INJECTION, SOLUTION SUBCUTANEOUS at 08:43

## 2017-10-12 RX ADMIN — LIDOCAINE HYDROCHLORIDE 10 ML: 20 SOLUTION ORAL; TOPICAL at 16:43

## 2017-10-12 RX ADMIN — LIDOCAINE HYDROCHLORIDE 10 ML: 20 SOLUTION ORAL; TOPICAL at 12:06

## 2017-10-12 RX ADMIN — GABAPENTIN 100 MG: 100 CAPSULE ORAL at 16:43

## 2017-10-12 RX ADMIN — HYDROMORPHONE HYDROCHLORIDE 1 MG: 1 INJECTION, SOLUTION INTRAMUSCULAR; INTRAVENOUS; SUBCUTANEOUS at 01:50

## 2017-10-12 RX ADMIN — SUCRALFATE 1000 MG: 1 SUSPENSION ORAL at 21:27

## 2017-10-12 RX ADMIN — LORAZEPAM 0.5 MG: 0.5 TABLET ORAL at 08:43

## 2017-10-12 RX ADMIN — INSULIN LISPRO 12 UNITS: 100 INJECTION, SOLUTION INTRAVENOUS; SUBCUTANEOUS at 12:07

## 2017-10-12 RX ADMIN — INSULIN LISPRO 4 UNITS: 100 INJECTION, SOLUTION INTRAVENOUS; SUBCUTANEOUS at 17:23

## 2017-10-12 RX ADMIN — PANTOPRAZOLE SODIUM 40 MG: 40 TABLET, DELAYED RELEASE ORAL at 16:43

## 2017-10-12 RX ADMIN — INSULIN LISPRO 1 UNITS: 100 INJECTION, SOLUTION INTRAVENOUS; SUBCUTANEOUS at 21:28

## 2017-10-12 RX ADMIN — PANTOPRAZOLE SODIUM 40 MG: 40 TABLET, DELAYED RELEASE ORAL at 06:01

## 2017-10-12 RX ADMIN — CITALOPRAM HYDROBROMIDE 40 MG: 20 TABLET ORAL at 08:41

## 2017-10-12 RX ADMIN — LIDOCAINE HYDROCHLORIDE 10 ML: 20 SOLUTION ORAL; TOPICAL at 21:27

## 2017-10-12 RX ADMIN — OXYCODONE HYDROCHLORIDE AND ACETAMINOPHEN 1 TABLET: 5; 325 TABLET ORAL at 22:46

## 2017-10-12 RX ADMIN — GABAPENTIN 100 MG: 100 CAPSULE ORAL at 08:42

## 2017-10-12 RX ADMIN — LORAZEPAM 1 MG: 1 TABLET ORAL at 17:25

## 2017-10-12 RX ADMIN — HYDROMORPHONE HYDROCHLORIDE 1 MG: 1 INJECTION, SOLUTION INTRAMUSCULAR; INTRAVENOUS; SUBCUTANEOUS at 06:09

## 2017-10-12 RX ADMIN — SUCRALFATE 500 MG: 1 SUSPENSION ORAL at 06:01

## 2017-10-12 RX ADMIN — VANCOMYCIN HYDROCHLORIDE 1250 MG: 1 INJECTION, POWDER, LYOPHILIZED, FOR SOLUTION INTRAVENOUS at 17:32

## 2017-10-12 RX ADMIN — GABAPENTIN 100 MG: 100 CAPSULE ORAL at 21:27

## 2017-10-12 RX ADMIN — HYDROMORPHONE HYDROCHLORIDE 1 MG: 1 INJECTION, SOLUTION INTRAMUSCULAR; INTRAVENOUS; SUBCUTANEOUS at 10:59

## 2017-10-12 RX ADMIN — FENOFIBRATE 145 MG: 145 TABLET ORAL at 08:39

## 2017-10-12 RX ADMIN — FOLIC ACID 1 MG: 1 TABLET ORAL at 08:41

## 2017-10-12 RX ADMIN — INSULIN LISPRO 12 UNITS: 100 INJECTION, SOLUTION INTRAVENOUS; SUBCUTANEOUS at 08:00

## 2017-10-12 RX ADMIN — LIDOCAINE HYDROCHLORIDE 10 ML: 20 SOLUTION ORAL; TOPICAL at 06:01

## 2017-10-12 RX ADMIN — INSULIN LISPRO 12 UNITS: 100 INJECTION, SOLUTION INTRAVENOUS; SUBCUTANEOUS at 17:23

## 2017-10-12 RX ADMIN — VANCOMYCIN HYDROCHLORIDE 1250 MG: 1 INJECTION, POWDER, LYOPHILIZED, FOR SOLUTION INTRAVENOUS at 05:54

## 2017-10-12 RX ADMIN — SODIUM CHLORIDE 60 ML/HR: 0.9 INJECTION, SOLUTION INTRAVENOUS at 03:29

## 2017-10-12 RX ADMIN — SUCRALFATE 1000 MG: 1 SUSPENSION ORAL at 16:43

## 2017-10-12 NOTE — PROGRESS NOTES
Progress Note - Infectious Disease   Maris Mckeon 60 y o  male MRN: [id number]  Unit/Bed#: E2 -01 Encounter: [id number]      Impression/Recommendations:  1   Scalp cellulitis  Patient failed PO doxycycline but is dramatically improved on IV vancomycin   CT of head without any subcutaneously abscess  He remains systemically well  Continue IV vancomycin for now  Serial scalp exams  Transition to p  o  antibiotics (Keflex/doxycycline) when patient is clinically improved, hopefully in the next 24 hours      2   Febrile neutropenia   I suspect this is all secondary to above   There is not another obvious source of active infection   Blood cultures are negative so far   Fever resolved with vancomycin for scalp cellulitis above   Neutropenia has resolved, with ANC now > 1000   Therefore, no other antibiotic for now   Admission blood cultures remain negative  Antibiotic plan as in above  Monitor temperature/ANC  Follow-up on pending blood cultures      3   HSV esophagitis   Patient completed a course of IV acyclovir last fall with improvement of symptom but persistent esophageal ulceration   I suspect that this will not be able to be eradicated, due to patient's significant immune suppression and being on high-dose prednisone   As long as he is not symptomatic and esophageal ulcer is stable, we can observe for now  Hold off on any further antiviral for now      4   CKD   Creatinine is baseline  Antibiotic dosages adjusted accordingly  Monitor creatinine      5   CLL, on chemotherapy   As stated above, patient's neutropenia is relatively mild  Monitor ANC      Discussed with the patient in detail regarding above plan      Antibiotics:  Vancomycin # 5     Subjective:  Patient's scalp pain continues to improve slowly  He is still requesting IV Dilaudid for pain control  Temperature stays down   No further chills    He is tolerating antibiotic well   No nausea, vomiting or diarrhea  Objective:  Vitals:  HR:  [75-97] 75  Resp:  [16-20] 16  BP: (131-146)/(75-79) 131/79  SpO2:  [96 %-97 %] 97 %  Temp (24hrs), Av 7 °F (36 5 °C), Min:97 2 °F (36 2 °C), Max:98 6 °F (37 °C)  Current: Temperature: (!) 97 4 °F (36 3 °C)    Physical Exam:     General: Awake, alert, cooperative, no distress  Head:  Much improved scalp erythema/warmth  No fluctuance  Mild tenderness  No ulceration  Lungs: Expansion symmetric, no rales, no wheezing, respirations unlabored  Heart[de-identified]  Regular rate and rhythm, S1 and S2 normal, no murmur  Abdomen: Soft, nondistended, non-tender, bowel sounds active all four quadrants,        no masses, no organomegaly  Extremities: No edema  No erythema/warmth  No ulcer  Nontender to palpation  Skin:  No rash  Invasive Devices     Central Venous Catheter Line            Port A Cath 10/12/17 less than 1 day          Peripheral Intravenous Line            Peripheral IV 17 Left;Dorsal (posterior) Hand 23 days                Labs studies:   I have personally reviewed pertinent labs      Results from last 7 days  Lab Units 10/12/17  0614 10/11/17  0547 10/10/17  0540 10/08/17  0546 10/07/17  2336   SODIUM mmol/L 144 142 142 140 139   POTASSIUM mmol/L 3 5 3 2* 3 5 3 8 3 4*   CHLORIDE mmol/L 107 107 107 105 103   CO2 mmol/L 31 30 30 28 29   ANION GAP mmol/L 6 5 5 7 7   BUN mg/dL 16 18 16 18 23   CREATININE mg/dL 0 97 1 01 0 86 1 00 1 23   EGFR ml/min/1 73sq m 83 79 92 80 62   GLUCOSE RANDOM mg/dL 96 87 95 154* 136   CALCIUM mg/dL 9 1 9 1 8 9 8 6 9 0   AST U/L  --   --   --  15 16   ALT U/L  --   --   --  19 21   ALK PHOS U/L  --   --   --  27* 36*   TOTAL PROTEIN g/dL  --   --   --  5 4* 5 8*   ALBUMIN g/dL  --   --   --  3 3* 3 6   BILIRUBIN TOTAL mg/dL  --   --   --  0 72 0 65       Results from last 7 days  Lab Units 10/12/17  0614 10/11/17  0547 10/10/17  0540   WBC Thousand/uL 2 45* 2 67* 2 13*   HEMOGLOBIN g/dL 9 0* 9 0* 9 7*   PLATELETS Thousands/uL 63* 71* 95*       Results from last 7 days  Lab Units 10/07/17  2895   BLOOD CULTURE  No Growth at 72 hrs  No Growth at 72 hrs  Imaging Studies:   I have personally reviewed pertinent imaging study reports and images in PACS  EKG, Pathology, and Other Studies:   I have personally reviewed pertinent reports

## 2017-10-12 NOTE — CASE MANAGEMENT
Continued Stay Review    Date:    10/12/2017    Vital Signs: /79   Pulse 75   Temp (!) 97 4 °F (36 3 °C) (Tympanic)   Resp 16   Ht 6' 1" (1 854 m)   Wt 93 9 kg (207 lb 0 2 oz)   SpO2 97%   BMI 27 31 kg/m²     Medications:   Scheduled Meds:   al mag oxide-diphenhydramine-lidocaine viscous 10 mL Oral 4x Daily (AC & HS)   aspirin 81 mg Oral Every Other Day   citalopram 40 mg Oral Daily   fenofibrate 145 mg Oral Daily   folic acid 1 mg Oral Daily   gabapentin 100 mg Oral BID   insulin glargine 15 Units Subcutaneous QAM   insulin lispro 1-6 Units Subcutaneous HS   insulin lispro 12 Units Subcutaneous TID With Meals   insulin lispro 4-20 Units Subcutaneous TID AC   LORazepam 0 5 mg Oral BID   pantoprazole 40 mg Oral BID AC   predniSONE 20 mg Oral Daily   sucralfate 500 mg Oral 4x Daily (AC & HS)   vancomycin 12 5 mg/kg Intravenous Q12H     Continuous Infusions:   sodium chloride 60 mL/hr Last Rate: Stopped (10/12/17 0554)     PRN Meds:   acetaminophen    benzonatate    HYDROmorphone    LORazepam    Abnormal Labs/Diagnostic Results:   WBC    2 45  H/H    9/28 9  RBC    3 12  Platelets   63    Age/Sex: 60 y o  male     Assessment/Plan:   Chronic lymphocytic leukemia/ chronic hemolytic anemia  Leukopenia/thrombocytopenia/anemia - pancytopenia  Immuno compromised with febrile neutropenia  Scalp cellulitis  HSV esophagitis/ persistent esophageal ulceration  Chronic kidney disease stage I , serum creatinine 1 01, GFR 79  Allergy to heparin  COPD  Coronary artery disease  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

## 2017-10-12 NOTE — PLAN OF CARE
DISCHARGE PLANNING     Discharge to home or other facility with appropriate resources Progressing        DISCHARGE PLANNING - CARE MANAGEMENT     Discharge to post-acute care or home with appropriate resources Progressing        INFECTION - ADULT     Absence or prevention of progression during hospitalization Progressing     Absence of fever/infection during neutropenic period Progressing        Knowledge Deficit     Patient/family/caregiver demonstrates understanding of disease process, treatment plan, medications, and discharge instructions Progressing        PAIN - ADULT     Verbalizes/displays adequate comfort level or baseline comfort level Progressing        Potential for Falls     Patient will remain free of falls Progressing        SAFETY ADULT     Patient will remain free of falls Progressing     Maintain or return to baseline ADL function Progressing     Maintain or return mobility status to optimal level Progressing

## 2017-10-12 NOTE — PROGRESS NOTES
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  Generalized anxiety disorder  Recommendation:    IV antibiotics as per primary ID team   Eventual transition to oral expected hopefully within next 24 hours  Increase Caralfate to 1 g instead of 500 mg  Limit IV Dilaudid  Try p r n  Percocet as necessary for pain  Increase Neurontin 100 mg t i d  Increase Ativan to 1 mg b i d  Plan discharge once cleared by Infectious Disease team  Patient will need to follow up with the GI team as outpatient  Continue current insulin, blood sugars very well controlled  Use of prednisone as per patient's primary Hematology team     Subjective:     Patient seen and examined today  EMR and overnight events reviewed  Patient remains anxious hypervigilant, keeps scratching his scalp  States that it itches and feels painful at times  He states that by itching his pain gets better  He wants to increase his current alpha to 1 g which is what he was taking prior to coming to the hospital   Denies any dysphagia nausea vomiting  Able to sleep slightly better  Worries about his travel plans going to her niece's baby shower the following weekend  Worried about his medications as there is some difficulty with his insurance and copays  Indicates that  from [facility] is trying to work with the drug company to get him discomfort medication or even get him for free for 1 year  Review of Systems     Constitutional: Denies appetite change   No chills, diaphoresis, fatigue or fever    HEENT: No congestion, sore throat  No visual complaints  Respiratory: No cough, chest tightness, shortness of breath and wheezing  Cardiovascular: No chest pain and palpitations  No Syncope or grey out  GI: Negative for abdominal distention, pain, constipation, diarrhea or nausea  Endocrine: Underlying diabetes  Genitourinary: Negative for decreased urine volume, dysuria, flank pain and urgency  Skin: Negative for rash  Neurological: Negative for dizziness, weakness, numbness and headaches  Hematological: Negative for spontaneous bruising or bleeding  Psychiatric: Insomnia and anxiety  All other systems reviewed and are negative  OBJECTIVE:     Vitals:     HR:  [75-97] 75  Resp:  [16-20] 16  BP: (131-146)/(75-79) 131/79  SpO2:  [96 %-97 %] 97 %  Temp (24hrs), Av 7 °F (36 5 °C), Min:97 2 °F (36 2 °C), Max:98 6 °F (37 °C)  Current: Temperature: (!) 97 4 °F (36 3 °C)    Intake/Output Summary (Last 24 hours) at 10/12/17 1431  Last data filed at 10/12/17 0740   Gross per 24 hour   Intake          1454 05 ml   Output                0 ml   Net          1454 05 ml     Body mass index is 27 31 kg/m²  Physical Exam   Constitutional: He is oriented to person, place, and time  He appears well-developed and well-nourished  No distress  HENT:   Head: Normocephalic and atraumatic  Mouth/Throat: Oropharynx is clear and moist  No oropharyngeal exudate  Eyes: Conjunctivae are normal  Pupils are equal, round, and reactive to light  No scleral icterus  Neck: Normal range of motion  Neck supple  No JVD present  Cardiovascular: Normal rate and regular rhythm  No murmur heard  Pulmonary/Chest: Effort normal and breath sounds normal  No respiratory distress  Abdominal: Soft  He exhibits no distension  There is no tenderness  There is no rebound and no guarding  Neurological: He is alert and oriented to person, place, and time   Coordination normal    Skin: Skin is warm and dry  He is not diaphoretic  No erythema  Psychiatric:   Anxious/hypervigilant, needs redirection at times  Nursing note and vitals reviewed  Labs, Imaging, & Other studies:    All pertinent labs and imaging studies were personally reviewed    Results from last 7 days  Lab Units 10/12/17  0614 10/11/17  0547 10/10/17  0540   WBC Thousand/uL 2 45* 2 67* 2 13*   HEMOGLOBIN g/dL 9 0* 9 0* 9 7*   PLATELETS Thousands/uL 63* 71* 95*     Absolute neutrophil count is 1 52      Results from last 7 days  Lab Units 10/12/17  0614 10/11/17  0547 10/10/17  0540 10/08/17  0546 10/07/17  2336   SODIUM mmol/L 144 142 142 140 139   POTASSIUM mmol/L 3 5 3 2* 3 5 3 8 3 4*   CHLORIDE mmol/L 107 107 107 105 103   CO2 mmol/L 31 30 30 28 29   ANION GAP mmol/L 6 5 5 7 7   BUN mg/dL 16 18 16 18 23   CREATININE mg/dL 0 97 1 01 0 86 1 00 1 23   EGFR ml/min/1 73sq m 83 79 92 80 62   GLUCOSE RANDOM mg/dL 96 87 95 154* 136   CALCIUM mg/dL 9 1 9 1 8 9 8 6 9 0   AST U/L  --   --   --  15 16   ALT U/L  --   --   --  19 21   ALK PHOS U/L  --   --   --  27* 36*   TOTAL PROTEIN g/dL  --   --   --  5 4* 5 8*   ALBUMIN g/dL  --   --   --  3 3* 3 6   BILIRUBIN TOTAL mg/dL  --   --   --  0 72 0 65       Results from last 7 days  Lab Units 10/07/17  2308   BLOOD CULTURE  No Growth After 4 Days  No Growth After 4 Days         Current Meds:  Current Facility-Administered Medications   Medication Dose Route Frequency Provider Last Rate Last Dose    acetaminophen (TYLENOL) tablet 650 mg  650 mg Oral Q6H PRN Cosme Griffin MD   650 mg at 10/12/17 0336    al mag oxide-diphenhydramine-lidocaine viscous (MAGIC MOUTHWASH) suspension 10 mL  10 mL Oral 4x Daily (AC & HS) Luz Valdivia MD   10 mL at 10/12/17 1206    aspirin chewable tablet 81 mg  81 mg Oral Every Other Day Cosme Griffin MD   81 mg at 10/12/17 0839    benzonatate (TESSALON PERLES) capsule 200 mg  200 mg Oral TID PRN Cosme Griffin MD        citalopram (CeleXA) tablet 40 mg  40 mg Oral Daily Chioma Luster Apgar, MD   40 mg at 10/12/17 0841    fenofibrate (TRICOR) tablet 145 mg  145 mg Oral Daily Donald Vivas MD   145 mg at  3144    folic acid (FOLVITE) tablet 1 mg  1 mg Oral Daily Chioma Luster Apgar, MD   1 mg at 10/12/17 0841    gabapentin (NEURONTIN) capsule 100 mg  100 mg Oral BID Donald Vivas MD   100 mg at 10/12/17 0842    HYDROmorphone (DILAUDID) 1 mg/mL injection 1 mg  1 mg Intravenous Q4H PRN Mayelin Reese DO   1 mg at 10/12/17 1059    insulin glargine (LANTUS) subcutaneous injection 15 Units  15 Units Subcutaneous QAM Donald Vivas MD   15 Units at 10/12/17 0843    insulin lispro (HumaLOG) 100 units/mL subcutaneous injection 1-6 Units  1-6 Units Subcutaneous HS Donald Vivas MD   2 Units at 10/11/17 2142    insulin lispro (HumaLOG) 100 units/mL subcutaneous injection 12 Units  12 Units Subcutaneous TID With Meals Donald Vivas MD   12 Units at 10/12/17 1207    insulin lispro (HumaLOG) 100 units/mL subcutaneous injection 4-20 Units  4-20 Units Subcutaneous TID AC Chioma Luster Apgar, MD   8 Units at 10/11/17 1723    LORazepam (ATIVAN) tablet 0 5 mg  0 5 mg Oral Q8H PRN Chioma Luster Apgar, MD        LORazepam (ATIVAN) tablet 0 5 mg  0 5 mg Oral BID Janae Anguiano MD   0 5 mg at 10/12/17 0843    pantoprazole (PROTONIX) EC tablet 40 mg  40 mg Oral BID AC Chioma Luster Apgar, MD   40 mg at 10/12/17 0601    predniSONE tablet 20 mg  20 mg Oral Daily Chioma Luster Apgar, MD   20 mg at 10/12/17 0840    sodium chloride 0 9 % infusion  60 mL/hr Intravenous Continuous Mayelin Reese DO   Stopped at 10/12/17 0554    sucralfate (CARAFATE) oral suspension 1,000 mg  1,000 mg Oral 4x Daily (AC & HS) Janae Anguiano MD        vancomycin (VANCOCIN) 1,250 mg in sodium chloride 0 9 % 250 mL IVPB  12 5 mg/kg Intravenous Q12H Marta Vaughan MD   Stopped at 10/12/17 0740     Home Meds:  Prescriptions Prior to Admission   Medication    aspirin 81 MG tablet    citalopram (CeleXA) 40 mg tablet    doxycycline (ADOXA) 100 MG tablet    fenofibrate (TRIGLIDE) 50 MG tablet    folic acid (FOLVITE) 1 mg tablet    gabapentin (NEURONTIN) 100 mg capsule    Ibrutinib 140 MG CAPS    insulin glargine (LANTUS) 100 units/mL subcutaneous injection    insulin lispro (HumaLOG) 100 units/mL injection    obinutuzumab (GAZYVA) 1000 mg/40 mL SOLN    pantoprazole (PROTONIX) 40 mg tablet    predniSONE 20 mg tablet    al mag oxide-diphenhydramine-lidocaine viscous (MAGIC MOUTHWASH)    Alcohol Swabs PADS    benzonatate (TESSALON) 200 MG capsule    glucose monitoring kit (FREESTYLE) monitoring kit    Lancets (FREESTYLE) lancets    LORazepam (ATIVAN) 0 5 mg tablet    multivitamin (THERAGRAN) TABS    ondansetron (ZOFRAN) 8 mg tablet    sucralfate (CARAFATE) 1 g/10 mL suspension       Continuous Infusions:    sodium chloride 60 mL/hr Last Rate: Stopped (10/12/17 0554)       Invasive Devices     Central Venous Catheter Line            Port A Cath 10/12/17 less than 1 day          Peripheral Intravenous Line            Peripheral IV 09/18/17 Left;Dorsal (posterior) Hand 24 days                VTE Mechanical Prophylaxis: sequential compression device    Portions of the record may have been created with voice recognition software  Occasional wrong word or "sound a like" substitutions may have occurred due to the inherent limitations of voice recognition software  Read the chart carefully and recognize, using context, where substitutions have occurred

## 2017-10-13 LAB
ANISOCYTOSIS BLD QL SMEAR: PRESENT
BACTERIA BLD CULT: NORMAL
BACTERIA BLD CULT: NORMAL
BASOPHILS # BLD MANUAL: 0.04 THOUSAND/UL (ref 0–0.1)
BASOPHILS NFR MAR MANUAL: 2 % (ref 0–1)
EOSINOPHIL # BLD MANUAL: 0.22 THOUSAND/UL (ref 0–0.4)
EOSINOPHIL NFR BLD MANUAL: 10 % (ref 0–6)
ERYTHROCYTE [DISTWIDTH] IN BLOOD BY AUTOMATED COUNT: 13.6 % (ref 11.6–15.1)
GLUCOSE SERPL-MCNC: 103 MG/DL (ref 65–140)
GLUCOSE SERPL-MCNC: 119 MG/DL (ref 65–140)
GLUCOSE SERPL-MCNC: 158 MG/DL (ref 65–140)
GLUCOSE SERPL-MCNC: 302 MG/DL (ref 65–140)
GLUCOSE SERPL-MCNC: 64 MG/DL (ref 65–140)
GLUCOSE SERPL-MCNC: 73 MG/DL (ref 65–140)
HCT VFR BLD AUTO: 30.1 % (ref 36.5–49.3)
HGB BLD-MCNC: 9.4 G/DL (ref 12–17)
LYMPHOCYTES # BLD AUTO: 0.57 THOUSAND/UL (ref 0.6–4.47)
LYMPHOCYTES # BLD AUTO: 26 % (ref 14–44)
MCH RBC QN AUTO: 28.9 PG (ref 26.8–34.3)
MCHC RBC AUTO-ENTMCNC: 31.2 G/DL (ref 31.4–37.4)
MCV RBC AUTO: 93 FL (ref 82–98)
MONOCYTES # BLD AUTO: 0.15 THOUSAND/UL (ref 0–1.22)
MONOCYTES NFR BLD: 7 % (ref 4–12)
MYELOCYTES NFR BLD MANUAL: 4 % (ref 0–1)
NEUTROPHILS # BLD MANUAL: 1.07 THOUSAND/UL (ref 1.85–7.62)
NEUTS BAND NFR BLD MANUAL: 2 % (ref 0–8)
NEUTS SEG NFR BLD AUTO: 47 % (ref 43–75)
NRBC BLD AUTO-RTO: 1 /100 WBC (ref 0–2)
NRBC BLD AUTO-RTO: 1 /100 WBCS
PLATELET # BLD AUTO: 70 THOUSANDS/UL (ref 149–390)
PLATELET BLD QL SMEAR: ABNORMAL
PMV BLD AUTO: 10.9 FL (ref 8.9–12.7)
POLYCHROMASIA BLD QL SMEAR: PRESENT
RBC # BLD AUTO: 3.25 MILLION/UL (ref 3.88–5.62)
TOTAL CELLS COUNTED SPEC: 100
VANCOMYCIN TROUGH SERPL-MCNC: 11.3 UG/ML (ref 10–20)
VARIANT LYMPHS # BLD AUTO: 2 %
WBC # BLD AUTO: 2.19 THOUSAND/UL (ref 4.31–10.16)

## 2017-10-13 PROCEDURE — 82948 REAGENT STRIP/BLOOD GLUCOSE: CPT

## 2017-10-13 PROCEDURE — 85007 BL SMEAR W/DIFF WBC COUNT: CPT | Performed by: INTERNAL MEDICINE

## 2017-10-13 PROCEDURE — 80202 ASSAY OF VANCOMYCIN: CPT | Performed by: INTERNAL MEDICINE

## 2017-10-13 PROCEDURE — 85027 COMPLETE CBC AUTOMATED: CPT | Performed by: INTERNAL MEDICINE

## 2017-10-13 RX ADMIN — SUCRALFATE 1000 MG: 1 SUSPENSION ORAL at 06:02

## 2017-10-13 RX ADMIN — OXYCODONE HYDROCHLORIDE AND ACETAMINOPHEN 1 TABLET: 5; 325 TABLET ORAL at 22:08

## 2017-10-13 RX ADMIN — FOLIC ACID 1 MG: 1 TABLET ORAL at 09:06

## 2017-10-13 RX ADMIN — LORAZEPAM 1 MG: 1 TABLET ORAL at 09:06

## 2017-10-13 RX ADMIN — LORAZEPAM 1 MG: 1 TABLET ORAL at 18:06

## 2017-10-13 RX ADMIN — SODIUM CHLORIDE 60 ML/HR: 0.9 INJECTION, SOLUTION INTRAVENOUS at 20:06

## 2017-10-13 RX ADMIN — GABAPENTIN 100 MG: 100 CAPSULE ORAL at 18:05

## 2017-10-13 RX ADMIN — SUCRALFATE 1000 MG: 1 SUSPENSION ORAL at 21:24

## 2017-10-13 RX ADMIN — VANCOMYCIN HYDROCHLORIDE 1250 MG: 1 INJECTION, POWDER, LYOPHILIZED, FOR SOLUTION INTRAVENOUS at 18:06

## 2017-10-13 RX ADMIN — INSULIN LISPRO 1 UNITS: 100 INJECTION, SOLUTION INTRAVENOUS; SUBCUTANEOUS at 21:24

## 2017-10-13 RX ADMIN — SODIUM CHLORIDE 60 ML/HR: 0.9 INJECTION, SOLUTION INTRAVENOUS at 00:00

## 2017-10-13 RX ADMIN — INSULIN GLARGINE 15 UNITS: 100 INJECTION, SOLUTION SUBCUTANEOUS at 09:33

## 2017-10-13 RX ADMIN — SUCRALFATE 1000 MG: 1 SUSPENSION ORAL at 18:06

## 2017-10-13 RX ADMIN — GABAPENTIN 100 MG: 100 CAPSULE ORAL at 09:06

## 2017-10-13 RX ADMIN — PANTOPRAZOLE SODIUM 40 MG: 40 TABLET, DELAYED RELEASE ORAL at 06:02

## 2017-10-13 RX ADMIN — PREDNISONE 20 MG: 20 TABLET ORAL at 09:06

## 2017-10-13 RX ADMIN — INSULIN LISPRO 12 UNITS: 100 INJECTION, SOLUTION INTRAVENOUS; SUBCUTANEOUS at 09:06

## 2017-10-13 RX ADMIN — INSULIN LISPRO 12 UNITS: 100 INJECTION, SOLUTION INTRAVENOUS; SUBCUTANEOUS at 18:05

## 2017-10-13 RX ADMIN — HYDROMORPHONE HYDROCHLORIDE 1 MG: 1 INJECTION, SOLUTION INTRAMUSCULAR; INTRAVENOUS; SUBCUTANEOUS at 05:20

## 2017-10-13 RX ADMIN — CITALOPRAM HYDROBROMIDE 40 MG: 20 TABLET ORAL at 09:06

## 2017-10-13 RX ADMIN — LIDOCAINE HYDROCHLORIDE 10 ML: 20 SOLUTION ORAL; TOPICAL at 18:05

## 2017-10-13 RX ADMIN — SUCRALFATE 1000 MG: 1 SUSPENSION ORAL at 11:39

## 2017-10-13 RX ADMIN — OXYCODONE HYDROCHLORIDE AND ACETAMINOPHEN 1 TABLET: 5; 325 TABLET ORAL at 18:06

## 2017-10-13 RX ADMIN — PANTOPRAZOLE SODIUM 40 MG: 40 TABLET, DELAYED RELEASE ORAL at 18:06

## 2017-10-13 RX ADMIN — OXYCODONE HYDROCHLORIDE AND ACETAMINOPHEN 1 TABLET: 5; 325 TABLET ORAL at 11:40

## 2017-10-13 RX ADMIN — LIDOCAINE HYDROCHLORIDE 10 ML: 20 SOLUTION ORAL; TOPICAL at 21:24

## 2017-10-13 RX ADMIN — VANCOMYCIN HYDROCHLORIDE 1250 MG: 1 INJECTION, POWDER, LYOPHILIZED, FOR SOLUTION INTRAVENOUS at 05:20

## 2017-10-13 RX ADMIN — FENOFIBRATE 145 MG: 145 TABLET ORAL at 09:06

## 2017-10-13 RX ADMIN — LIDOCAINE HYDROCHLORIDE 10 ML: 20 SOLUTION ORAL; TOPICAL at 11:39

## 2017-10-13 RX ADMIN — LIDOCAINE HYDROCHLORIDE 10 ML: 20 SOLUTION ORAL; TOPICAL at 06:02

## 2017-10-13 RX ADMIN — GABAPENTIN 100 MG: 100 CAPSULE ORAL at 21:24

## 2017-10-13 NOTE — PROGRESS NOTES
Patient refused afternoon dose of Insulin d/t BS being "too low" as per him  Dr Navas Level notified of blood sugars being lower for breakfast and lunch  Will follow hypoglycemia protocol and CTM

## 2017-10-13 NOTE — PROGRESS NOTES
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  Generalized anxiety disorder  Recommendation:    Continue IV vancomycin for 1 more day, transition to oral combination of Keflex and doxycycline from tomorrow-for total of 14 days, that would be another 7 days  Continue to monitor absolute neutrophil count, pending blood cultures  Patient will follow up with the GI team regarding HSV esophagitis, he had completed IV acyclovir last fall, remains immune suppressed with high-dose prednisone for his autoimmune hemolytic anemia  Patient will follow with Hematology regarding his chronic lymphocytic leukemia/chemotherapy  Subjective:     Patient seen and examined today  EMR and overnight events reviewed  Resting comfortably not in any distress  Mood is much better  Was able to get some sleep  Denies any fever or chills  Scalp swelling/redness has improved  No headaches dizziness lightheadedness  Blood sugars noted and discussed with the nursing staff  Review of Systems     Constitutional: Denies appetite change  No chills, diaphoresis, fatigue or fever  HEENT: No congestion, sore throat  No visual complaints  Respiratory: No cough, chest tightness, shortness of breath and wheezing  Cardiovascular: No chest pain and palpitations  No Syncope or grey out  GI: Negative for abdominal distention, pain, constipation, diarrhea or nausea  Endocrine: Negative for cold or heat intolerance, polydipsia and polyuria     Genitourinary: Negative for decreased urine volume, dysuria, flank pain and urgency  Skin: Negative for rash  Neurological: Negative for dizziness, weakness, numbness and headaches  Hematological: Negative for spontaneous bruising or bleeding  Psychiatric: Negative for confusion, dysphoric mood, hallucinations  All other systems reviewed and are negative  OBJECTIVE:     Vitals:     HR:  [70-76] 70  Resp:  [18-20] 20  BP: (135-164)/(70-88) 144/70  SpO2:  [94 %-95 %] 95 %  Temp (24hrs), Av 4 °F (36 3 °C), Min:97 °F (36 1 °C), Max:97 9 °F (36 6 °C)  Current: Temperature: 97 9 °F (36 6 °C)    Intake/Output Summary (Last 24 hours) at 10/13/17 1244  Last data filed at 10/13/17 0000   Gross per 24 hour   Intake          1230 05 ml   Output                0 ml   Net          1230 05 ml     Body mass index is 27 31 kg/m²  Physical Exam    General Appearance:  Awake,alert, cooperative  Not in distress  Pallor / Icterus/ Cyanosis negative  Head:  Normocephalic, atraumatic  Mild puffiness on the scalp, significantly improved, no redness no drainage, no open wounds  Eyes:  No eye redness or discharge bilaterally  Neck: Supple, no raised JVP  Back:   Symmetric, no kypho-scoliosis  Lungs:   B/L Clear to auscultation, no wheezing or rhonchi  Normal broncho-alveolar air entry  No pleural rubs  Heart:   Regular S1S2, A2P2 normal, no murmur or gallop  Abdomen:   Soft, non-tender,no rebound, bowel sounds+  Musculoskeletal: Extremities no cyanosis or edema  Psych: Normal Affect / No hallucinations or delusions  Neurologic:           Skin:  Endocrine:  Vascular: Awake and alert  Mentation intact  Speech is intact  Facial symmetry is maintained  Good shoulder shrug and hand grasp  Muscle strength is 5/5 in all muscle  Light touch and temperature sensation is maintained  No rashes /purpura  No open wounds  No thyromegaly / no lid lag  Homans sign is negative  B/L Calf are supple and non tender         Labs, Imaging, & Other studies:    All pertinent labs and imaging studies were personally reviewed    Results from last 7 days  Lab Units 10/13/17  0518 10/12/17  0614 10/11/17  0547   WBC Thousand/uL 2 19* 2 45* 2 67*   HEMOGLOBIN g/dL 9 4* 9 0* 9 0*   PLATELETS Thousands/uL 70* 63* 71*       Results from last 7 days  Lab Units 10/12/17  0614 10/11/17  0547 10/10/17  0540 10/08/17  0546 10/07/17  2336   SODIUM mmol/L 144 142 142 140 139   POTASSIUM mmol/L 3 5 3 2* 3 5 3 8 3 4*   CHLORIDE mmol/L 107 107 107 105 103   CO2 mmol/L 31 30 30 28 29   ANION GAP mmol/L 6 5 5 7 7   BUN mg/dL 16 18 16 18 23   CREATININE mg/dL 0 97 1 01 0 86 1 00 1 23   EGFR ml/min/1 73sq m 83 79 92 80 62   GLUCOSE RANDOM mg/dL 96 87 95 154* 136   CALCIUM mg/dL 9 1 9 1 8 9 8 6 9 0   AST U/L  --   --   --  15 16   ALT U/L  --   --   --  19 21   ALK PHOS U/L  --   --   --  27* 36*   TOTAL PROTEIN g/dL  --   --   --  5 4* 5 8*   ALBUMIN g/dL  --   --   --  3 3* 3 6   BILIRUBIN TOTAL mg/dL  --   --   --  0 72 0 65       Results from last 7 days  Lab Units 10/07/17  2308   BLOOD CULTURE  No Growth After 5 Days  No Growth After 5 Days         Current Meds:  Current Facility-Administered Medications   Medication Dose Route Frequency Provider Last Rate Last Dose    acetaminophen (TYLENOL) tablet 650 mg  650 mg Oral Q6H PRN Gustabo Vance MD   650 mg at 10/12/17 0336    al mag oxide-diphenhydramine-lidocaine viscous (MAGIC MOUTHWASH) suspension 10 mL  10 mL Oral 4x Daily (AC & HS) Gabby Peralta MD   10 mL at 10/13/17 1139    aspirin chewable tablet 81 mg  81 mg Oral Every Other Day Gustabo Vance MD   81 mg at 10/12/17 0839    benzonatate (TESSALON PERLES) capsule 200 mg  200 mg Oral TID PRN Gustabo Vance MD        citalopram (CeleXA) tablet 40 mg  40 mg Oral Daily Luz Keo Johnson MD   40 mg at 10/13/17 0906    fenofibrate (TRICOR) tablet 145 mg  145 mg Oral Daily Gustabo Vance MD   145 mg at 18/98/72 6521    folic acid (FOLVITE) tablet 1 mg  1 mg Oral Daily Luz Valdivia MD   1 mg at 10/13/17 0906    gabapentin (NEURONTIN) capsule 100 mg  100 mg Oral TID Russell Crowley MD   100 mg at 10/13/17 0906    HYDROmorphone (DILAUDID) 1 mg/mL injection 1 mg  1 mg Intravenous Q4H PRN Mayelin Reese DO   1 mg at 10/13/17 0520    insulin glargine (LANTUS) subcutaneous injection 15 Units  15 Units Subcutaneous QAM Cosme Griffin MD   15 Units at 10/13/17 0933    insulin lispro (HumaLOG) 100 units/mL subcutaneous injection 1-6 Units  1-6 Units Subcutaneous HS Cosme Griffin MD   1 Units at 10/12/17 2128    insulin lispro (HumaLOG) 100 units/mL subcutaneous injection 10 Units  10 Units Subcutaneous TID With Meals Russell Crowley MD        insulin lispro (HumaLOG) 100 units/mL subcutaneous injection 4-20 Units  4-20 Units Subcutaneous TID AC Luz Valdivia MD   4 Units at 10/12/17 1723    LORazepam (ATIVAN) tablet 0 5 mg  0 5 mg Oral Q8H PRN Luz Valdivia MD        LORazepam (ATIVAN) tablet 1 mg  1 mg Oral BID Russell Crowley MD   1 mg at 10/13/17 0906    oxyCODONE-acetaminophen (PERCOCET) 5-325 mg per tablet 1 tablet  1 tablet Oral Q4H PRN Russell Crowley MD   1 tablet at 10/13/17 1140    pantoprazole (PROTONIX) EC tablet 40 mg  40 mg Oral BID AC Luz Valdivia MD   40 mg at 10/13/17 0602    predniSONE tablet 20 mg  20 mg Oral Daily Luz Valdivia MD   20 mg at 10/13/17 0906    sodium chloride 0 9 % infusion  60 mL/hr Intravenous Continuous Mayelin Reese DO 60 mL/hr at 10/13/17 0000 60 mL/hr at 10/13/17 0000    sucralfate (CARAFATE) oral suspension 1,000 mg  1,000 mg Oral 4x Daily (AC & HS) Russell Crowley MD   1,000 mg at 10/13/17 1139    vancomycin (VANCOCIN) 1,250 mg in sodium chloride 0 9 % 250 mL IVPB  12 5 mg/kg Intravenous Q12H Sharlot Mariposa,  7 mL/hr at 10/13/17 0520 1,250 mg at 10/13/17 0520     Home Meds:  Prescriptions Prior to Admission   Medication    aspirin 81 MG tablet    citalopram (CeleXA) 40 mg tablet    doxycycline (ADOXA) 100 MG tablet    fenofibrate (TRIGLIDE) 50 MG tablet    folic acid (FOLVITE) 1 mg tablet    gabapentin (NEURONTIN) 100 mg capsule    Ibrutinib 140 MG CAPS    insulin glargine (LANTUS) 100 units/mL subcutaneous injection    insulin lispro (HumaLOG) 100 units/mL injection    obinutuzumab (GAZYVA) 1000 mg/40 mL SOLN    pantoprazole (PROTONIX) 40 mg tablet    predniSONE 20 mg tablet    al mag oxide-diphenhydramine-lidocaine viscous (MAGIC MOUTHWASH)    Alcohol Swabs PADS    benzonatate (TESSALON) 200 MG capsule    glucose monitoring kit (FREESTYLE) monitoring kit    Lancets (FREESTYLE) lancets    LORazepam (ATIVAN) 0 5 mg tablet    multivitamin (THERAGRAN) TABS    ondansetron (ZOFRAN) 8 mg tablet    sucralfate (CARAFATE) 1 g/10 mL suspension       Continuous Infusions:    sodium chloride 60 mL/hr Last Rate: 60 mL/hr (10/13/17 0000)       Invasive Devices     Central Venous Catheter Line            Port A Cath 10/12/17 1 day          Peripheral Intravenous Line            Peripheral IV 09/18/17 Left;Dorsal (posterior) Hand 25 days                  Portions of the record may have been created with voice recognition software  Occasional wrong word or "sound a like" substitutions may have occurred due to the inherent limitations of voice recognition software  Read the chart carefully and recognize, using context, where substitutions have occurred

## 2017-10-13 NOTE — PROGRESS NOTES
Progress Note - Infectious Disease   Radha Trotter 60 y o  male MRN: [id number]  Unit/Bed#: E2 -01 Encounter: [id number]      Impression/Recommendations:  1   Scalp cellulitis  Patient failed PO doxycycline but is dramatically improved on IV vancomycin   CT of head without any subcutaneously abscess  He remains systemically well  Continue IV vancomycin for now  Serial scalp exams  Transition to p  o  antibiotics (Keflex/doxycycline) at discharge  Treat x 14 days total, another 8 days      2   Febrile neutropenia   I suspect this is all secondary to above   There is not another obvious source of active infection   Blood cultures are negative so far   Fever resolved with vancomycin for scalp cellulitis above   Neutropenia has resolved, with ANC remaining > 1000   Therefore, no other antibiotic for now   Admission blood cultures remain negative  Antibiotic plan as in above  Monitor temperature/ANC  Follow-up on pending blood cultures      3   HSV esophagitis   Patient completed a course of IV acyclovir last fall with improvement of symptom but persistent esophageal ulceration   I suspect that this will not be able to be eradicated, due to patient's significant immune suppression and being on high-dose prednisone   As long as he is not symptomatic and esophageal ulcer is stable, we can observe for now  Hold off on any further antiviral for now      4   CKD   Creatinine is baseline  Antibiotic dosages adjusted accordingly  Monitor creatinine      5   CLL, on chemotherapy   As stated above, patient's neutropenia is relatively mild  Monitor ANC      Discussed with the patient in detail regarding above plan  Discharge plan per primary service  Okay for discharge from ID viewpoint      Antibiotics:  Vancomycin # 6     Subjective:  Patient's scalp pain continues to improve slowly  He is still requesting IV Dilaudid intermittently for pain control  Temperature stays down   No further chills    He is tolerating antibiotic well   No nausea, vomiting or diarrhea  Objective:  Vitals:  HR:  [70-76] 70  Resp:  [18-20] 20  BP: (135-164)/(70-88) 144/70  SpO2:  [94 %-95 %] 95 %  Temp (24hrs), Av 4 °F (36 3 °C), Min:97 °F (36 1 °C), Max:97 9 °F (36 6 °C)  Current: Temperature: 97 9 °F (36 6 °C)    Physical Exam:     General: Awake, alert, cooperative, no distress  Head:  Mild scalp erythema  No warmth  No fluctuance  No drainage  Improved tenderness  Lungs: Expansion symmetric, no rales, no wheezing, respirations unlabored  Heart[de-identified]  Regular rate and rhythm, S1 and S2 normal, no murmur  Abdomen: Soft, nondistended, non-tender, bowel sounds active all four quadrants,        no masses, no organomegaly  Extremities: No edema  No erythema/warmth  No ulcer  Nontender to palpation  Skin:  No rash  Invasive Devices     Central Venous Catheter Line            Port A Cath 10/12/17 1 day          Peripheral Intravenous Line            Peripheral IV 17 Left;Dorsal (posterior) Hand 24 days                Labs studies:   I have personally reviewed pertinent labs      Results from last 7 days  Lab Units 10/12/17  0614 10/11/17  0547 10/10/17  0540 10/08/17  0546 10/07/17  2336   SODIUM mmol/L 144 142 142 140 139   POTASSIUM mmol/L 3 5 3 2* 3 5 3 8 3 4*   CHLORIDE mmol/L 107 107 107 105 103   CO2 mmol/L 31 30 30 28 29   ANION GAP mmol/L 6 5 5 7 7   BUN mg/dL 16 18 16 18 23   CREATININE mg/dL 0 97 1 01 0 86 1 00 1 23   EGFR ml/min/1 73sq m 83 79 92 80 62   GLUCOSE RANDOM mg/dL 96 87 95 154* 136   CALCIUM mg/dL 9 1 9 1 8 9 8 6 9 0   AST U/L  --   --   --  15 16   ALT U/L  --   --   --  19 21   ALK PHOS U/L  --   --   --  27* 36*   TOTAL PROTEIN g/dL  --   --   --  5 4* 5 8*   ALBUMIN g/dL  --   --   --  3 3* 3 6   BILIRUBIN TOTAL mg/dL  --   --   --  0 72 0 65       Results from last 7 days  Lab Units 10/13/17  0518 10/12/17  0614 10/11/17  0547   WBC Thousand/uL 2 19* 2 45* 2 67*   HEMOGLOBIN g/dL 9 4* 9 0* 9 0*   PLATELETS Thousands/uL 70* 63* 71*       Results from last 7 days  Lab Units 10/07/17  2308   BLOOD CULTURE  No Growth After 4 Days  No Growth After 4 Days  Imaging Studies:   I have personally reviewed pertinent imaging study reports and images in PACS  EKG, Pathology, and Other Studies:   I have personally reviewed pertinent reports

## 2017-10-14 VITALS
WEIGHT: 207.01 LBS | BODY MASS INDEX: 27.44 KG/M2 | OXYGEN SATURATION: 99 % | HEART RATE: 74 BPM | TEMPERATURE: 96 F | RESPIRATION RATE: 18 BRPM | SYSTOLIC BLOOD PRESSURE: 146 MMHG | DIASTOLIC BLOOD PRESSURE: 89 MMHG | HEIGHT: 73 IN

## 2017-10-14 LAB — GLUCOSE SERPL-MCNC: 95 MG/DL (ref 65–140)

## 2017-10-14 PROCEDURE — 82948 REAGENT STRIP/BLOOD GLUCOSE: CPT

## 2017-10-14 RX ORDER — CEPHALEXIN 500 MG/1
500 CAPSULE ORAL EVERY 6 HOURS SCHEDULED
Status: SHIPPED | OUTPATIENT
Start: 2017-10-14 | End: 2017-10-21

## 2017-10-14 RX ORDER — OXYCODONE HYDROCHLORIDE AND ACETAMINOPHEN 5; 325 MG/1; MG/1
1 TABLET ORAL EVERY 4 HOURS PRN
Qty: 10 TABLET | Refills: 0 | Status: SHIPPED | OUTPATIENT
Start: 2017-10-14 | End: 2017-10-14

## 2017-10-14 RX ORDER — DOXYCYCLINE 100 MG/1
100 TABLET ORAL 2 TIMES DAILY
Qty: 20 TABLET | Refills: 0 | Status: SHIPPED | OUTPATIENT
Start: 2017-10-14 | End: 2017-10-21

## 2017-10-14 RX ORDER — CEPHALEXIN 500 MG/1
500 CAPSULE ORAL EVERY 6 HOURS SCHEDULED
Qty: 28 CAPSULE | Refills: 0 | Status: SHIPPED | OUTPATIENT
Start: 2017-10-14 | End: 2017-10-21

## 2017-10-14 RX ORDER — OXYCODONE HYDROCHLORIDE AND ACETAMINOPHEN 5; 325 MG/1; MG/1
1 TABLET ORAL EVERY 4 HOURS PRN
Qty: 10 TABLET | Refills: 0 | Status: SHIPPED | OUTPATIENT
Start: 2017-10-14 | End: 2017-10-24 | Stop reason: ALTCHOICE

## 2017-10-14 RX ADMIN — CITALOPRAM HYDROBROMIDE 40 MG: 20 TABLET ORAL at 08:32

## 2017-10-14 RX ADMIN — LIDOCAINE HYDROCHLORIDE 10 ML: 20 SOLUTION ORAL; TOPICAL at 06:30

## 2017-10-14 RX ADMIN — VANCOMYCIN HYDROCHLORIDE 1250 MG: 1 INJECTION, POWDER, LYOPHILIZED, FOR SOLUTION INTRAVENOUS at 05:06

## 2017-10-14 RX ADMIN — OXYCODONE HYDROCHLORIDE AND ACETAMINOPHEN 1 TABLET: 5; 325 TABLET ORAL at 09:52

## 2017-10-14 RX ADMIN — PREDNISONE 20 MG: 20 TABLET ORAL at 08:32

## 2017-10-14 RX ADMIN — INSULIN GLARGINE 15 UNITS: 100 INJECTION, SOLUTION SUBCUTANEOUS at 08:32

## 2017-10-14 RX ADMIN — PANTOPRAZOLE SODIUM 40 MG: 40 TABLET, DELAYED RELEASE ORAL at 06:30

## 2017-10-14 RX ADMIN — SUCRALFATE 1000 MG: 1 SUSPENSION ORAL at 06:30

## 2017-10-14 RX ADMIN — LORAZEPAM 1 MG: 1 TABLET ORAL at 08:32

## 2017-10-14 RX ADMIN — ASPIRIN 81 MG 81 MG: 81 TABLET ORAL at 08:32

## 2017-10-14 RX ADMIN — FENOFIBRATE 145 MG: 145 TABLET ORAL at 08:31

## 2017-10-14 RX ADMIN — OXYCODONE HYDROCHLORIDE AND ACETAMINOPHEN 1 TABLET: 5; 325 TABLET ORAL at 04:15

## 2017-10-14 RX ADMIN — GABAPENTIN 100 MG: 100 CAPSULE ORAL at 08:32

## 2017-10-14 RX ADMIN — FOLIC ACID 1 MG: 1 TABLET ORAL at 08:32

## 2017-10-14 NOTE — DISCHARGE SUMMARY
Discharge Summary -  Internal Medicine   Estephanie Varela 60 y o  male MRN: [id number]    [facility] [place] [address] MED SURG Room / Bed: [place] [address] /E2 -* Encounter: [id number]      Admitting Provider: Nicole Boston MD  Discharge Provider: Nicole Boston MD  Admission Date: 10/7/2017       Discharge Date: 10/14/17  Primary Care Physician at Discharge: Nicole Boston [phone]    Primary Discharge Diagnosis  Chronic lymphocytic leukemia/ chronic hemolytic anemia  Leukopenia/thrombocytopenia/anemia - pancytopenia  Scalp cellulitis  HSV esophagitis/ persistent esophageal ulceration  Chronic kidney disease stage I , serum creatinine 1 01, GFR 79  Allergy to heparin  COPD  Coronary artery disease  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  Generalized anxiety disorder  Other Problems Addressed  Patient Active Problem List    Diagnosis Date Noted    Fever 10/08/2017    Cellulitis of head or scalp 10/08/2017    [facility] to [facility] 09/18/2017    Hyperglycemia 09/18/2017    Chronic lymphocytic leukemia ([facility] ) 03/20/2017    Anemia, chronic disease 03/10/2017    Hemolytic anemia (HCC) 03/06/2017    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[facility] ) 03/06/2017    H/O blood clots     Gastroesophageal reflux disease with esophagitis 01/18/2017    Candida esophagitis (HCC) 01/18/2017    CLL (chronic lymphocytic leukemia) (HCC)     Hyperlipidemia     Hypertension     History of TIA (transient ischemic attack)     CKD (chronic kidney disease) stage 3, GFR 30-59 ml/min     CAD (coronary artery disease) 01/01/2004       Consulting Providers   Dr Tracy Singh Hematology  Dr Cris Martinez the Infectious Disease    Diagnostic Procedures Performed  CT BRAIN - WITHOUT CONTRAST     INDICATION:  CLL  Pain at [facility] of head with tender mass     COMPARISON:  September 18, 2017     TECHNIQUE:  CT examination of the brain was performed    In addition to axial images, coronal reformatted images were created and submitted for interpretation        Radiation dose length product (DLP) for this visit:  1026 mGy-cm   This examination, like all CT scans performed in the [facility], was performed utilizing techniques to minimize radiation dose exposure, including the use of iterative   reconstruction and automated exposure control        IMAGE QUALITY:  Diagnostic      FINDINGS:     PARENCHYMA: Age-related central atrophy       No intracranial mass, mass effect or midline shift  No CT signs of acute infarction  There is no parenchymal hemorrhage           VENTRICLES AND EXTRA-AXIAL SPACES:  Normal for patient's age      VISUALIZED ORBITS AND PARANASAL SINUSES:  Orbits appear normal  Moderate scattered sinus mucosal thickening is noted  Small amounts of fluid in the sphenoid sinuses      CALVARIUM AND EXTRACRANIAL SOFT TISSUES:  Soft tissue swelling posterior scalp at the vertex  Skin thickening  No focal mass or collection  This appears worse when compared to prior exam         IMPRESSION:  No acute intracranial abnormality  Worsening Soft tissue swelling posterior scalp at the vertex  No focal mass or collection  Please correlate clinically for infection versus other etiologies including unlikely cutaneous CLL  CHEST - DUAL ENERGY     INDICATION: 27-year-old male, fever  COMPARISON: 9/18/2017 chest x-ray     VIEWS:  PA (including soft tissue/bone algorithms) and lateral projections; 4 images     FINDINGS:     The lungs are clear  No pleural effusions  The cardiomediastinal silhouette is unremarkable  Bony thorax is unremarkable  Right-sided portacatheter, typical appearance, terminating at cavoatrial junction    No pneumothorax     Findings are stable     IMPRESSION:     No acute cardiopulmonary disease, stable                                      Findings are consistent with emergency room physician's preliminary reading        Treatments IV vancomycin as per Infectious Disease team  Blood cultures remained negative  Given filgrastim    Procedures   Imaging studies, blood cultures, routine lab work  Other Pertinent Test Results    Results from last 7 days  Lab Units 10/13/17  0518  10/07/17  2336   WBC Thousand/uL 2 19*  < > 1 38*   HEMOGLOBIN g/dL 9 4*  < > 10 1*   HEMATOCRIT % 30 1*  < > 31 1*   PLATELETS Thousands/uL 70*  < > 93*   INR   --   --  1 19*   < > = values in this interval not displayed  Results from last 7 days  Lab Units 10/12/17  0614  10/08/17  0546   SODIUM mmol/L 144  < > 140   POTASSIUM mmol/L 3 5  < > 3 8   CHLORIDE mmol/L 107  < > 105   CO2 mmol/L 31  < > 28   BUN mg/dL 16  < > 18   CREATININE mg/dL 0 97  < > 1 00   CALCIUM mg/dL 9 1  < > 8 6   TOTAL PROTEIN g/dL  --   --  5 4*   BILIRUBIN TOTAL mg/dL  --   --  0 72   ALK PHOS U/L  --   --  27*   ALT U/L  --   --  19   AST U/L  --   --  15   GLUCOSE RANDOM mg/dL 96  < > 154*   < > = values in this interval not displayed  Lab Results   Component Value Date    BLOODCX No Growth After 5 Days  10/07/2017    BLOODCX No Growth After 5 Days  10/07/2017         Presenting Problem/History of Present Illness  Cellulitis of head or scalp    HOSPITAL COURSE:  A 62-year-old gentleman who has long history of chronic lymphocytic leukemia, which was initially diagnosed in 2001  He has been under the care of Dr Warren Santos for 12 years  He has been extensively treated with multiple line of chemotherapy, monoclonal antibody as well as targeted treatment  Most recently, he has been on obinutuzumab and ibrutinib  However, he took last ibrutinib last week, since his funding run out for this medication  He came in with complaint of fever, 102  He has been having some skin redness over the top of the head  This skin lesion was erythematous  This was thought to be scalp cellulitis  He  was treated with IV vancomycin  with quick resolution of fever and erythematous skin lesions  Currently, he feels well  He has no respiratory symptoms  He denied any pain  He has pancytopenia  His neutrophil was less than 0 5 which has since improved  He is started on G-CSF  His performance status appeared to be 0 to 1/4 on the ECOG scale  PHYSICAL EXAM:  Vitals:   Temp:  [96 °F (35 6 °C)-98 1 °F (36 7 °C)] 96 °F (35 6 °C)  HR:  [72-76] 74  Resp:  [18] 18  BP: (143-150)/(73-89) 146/89    General Appearance:    Alert, cooperative, no distress   Head:    Normocephalic without obvious abnormality   Eyes:    PERRL, conjunctiva/corneas clear, EOM's intact        Neck:   Supple, no adenopathy, no JVD   Back:     Symmetric, no spinal or CVA tenderness   Lungs:     Clear to auscultation bilaterally, no wheezing or rhonchi    Right-sided pectoral Port-A-Cath  Heart:    Regular rate and rhythm, S1 and S2 normal, no murmur   Abdomen:     Soft, non-tender, bowel sounds active    Extremities:   Extremities normal  No clubbing, cyanosis or edema   Psych:   Normal Affect   Neurologic:   CNII-XII intact  Strength symmetric, speech intact       Discharge Disposition: Home/Self Care  Facility:  Home    Discharge Medications:  See after visit summary for reconciled discharge medications provided to patient and family  Allergies  Allergies   Allergen Reactions    Heparin Other (See Comments)     clot    Macrolides And Ketolides Other (See Comments)     Interacts with other meds he is taking       Diet restrictions:  Diabetic  Activity restrictions:  None  Discharge Condition: fair    Outpatient Follow-Up    Infectious Disease  Gastroenterology  Hematology Oncology    Code Status: Level 1 - Full Code  Advance Directive and Living Will: <no information>  Power of :      Discharge Statement   I spent 35 minutes discharging the patient  This time was spent on the day of discharge  Greater than 50% of total time was spent with the patient and / or family counseling and / or coordination of care

## 2017-10-14 NOTE — PROGRESS NOTES
Flushed the port a cath with 5ml of NSS  Dr Eliu Hawthorne is aware  De accessed the catheter as per doctors order

## 2017-10-14 NOTE — PLAN OF CARE
DISCHARGE PLANNING     Discharge to home or other facility with appropriate resources Completed        DISCHARGE PLANNING - CARE MANAGEMENT     Discharge to post-acute care or home with appropriate resources Completed        INFECTION - ADULT     Absence or prevention of progression during hospitalization Completed     Absence of fever/infection during neutropenic period Completed        Knowledge Deficit     Patient/family/caregiver demonstrates understanding of disease process, treatment plan, medications, and discharge instructions Completed        PAIN - ADULT     Verbalizes/displays adequate comfort level or baseline comfort level Completed        Potential for Falls     Patient will remain free of falls Completed        SAFETY ADULT     Patient will remain free of falls Completed     Maintain or return to baseline ADL function Completed     Maintain or return mobility status to optimal level Completed

## 2017-10-16 ENCOUNTER — HOSPITAL ENCOUNTER (OUTPATIENT)
Dept: INFUSION CENTER | Facility: CLINIC | Age: 62
Discharge: HOME | End: 2017-10-16
Payer: MEDICARE

## 2017-10-16 LAB
ALBUMIN SERPL BCP-MCNC: 3.6 G/DL (ref 3.2–5)
ALP SERPL-CCNC: 31 U/L (ref 46–116)
ALT SERPL W P-5'-P-CCNC: 17 U/L (ref 12–78)
ANION GAP SERPL CALCULATED.3IONS-SCNC: 13 MMOL/L (ref 4–13)
ANISOCYTOSIS BLD QL SMEAR: PRESENT
AST SERPL W P-5'-P-CCNC: 24 U/L (ref 5–45)
BASOPHILS # BLD AUTO: 0 THOUSAND/UL (ref 0–0.1)
BASOPHILS NFR MAR MANUAL: 0 % (ref 0–1)
BILIRUB DIRECT SERPL-MCNC: 0.23 MG/DL (ref 0–0.2)
BILIRUB SERPL-MCNC: 0.9 MG/DL (ref 0.2–1)
BUN SERPL-MCNC: 20 MG/DL (ref 5–25)
CALCIUM SERPL-MCNC: 9.5 MG/DL (ref 8.3–10.1)
CHLORIDE SERPL-SCNC: 100 MMOL/L (ref 98–108)
CO2 SERPL-SCNC: 28 MMOL/L (ref 21–32)
CREAT SERPL-MCNC: 1.3 MG/DL (ref 0.6–1.3)
EOSINOPHIL # BLD AUTO: 0.21 THOUSAND/UL (ref 0–0.61)
EOSINOPHIL NFR BLD MANUAL: 5 % (ref 0–6)
ERYTHROCYTE [DISTWIDTH] IN BLOOD BY AUTOMATED COUNT: 15.7 % (ref 11.6–15.1)
GFR SERPL CREATININE-BSD FRML MDRD: 58 ML/MIN/1.73SQ M
GLUCOSE SERPL-MCNC: 108 MG/DL (ref 65–140)
HCT VFR BLD AUTO: 31.2 % (ref 36.5–49.3)
HGB BLD-MCNC: 9.7 G/DL (ref 12–17)
IGG SERPL-MCNC: 151 MG/DL (ref 700–1600)
LDH SERPL-CCNC: 299 U/L (ref 81–234)
LYMPHOCYTES # BLD AUTO: 0.86 THOUSAND/UL (ref 0.6–4.47)
LYMPHOCYTES # BLD AUTO: 21 % (ref 14–44)
MCH RBC QN AUTO: 28 PG (ref 26.8–34.3)
MCHC RBC AUTO-ENTMCNC: 31.2 G/DL (ref 31.4–37.4)
MCV RBC AUTO: 90 FL (ref 82–98)
METAMYELOCYTES NFR BLD MANUAL: 1 % (ref 0–1)
MONOCYTES # BLD AUTO: 0.21 THOUSAND/UL (ref 0–1.22)
MONOCYTES NFR BLD AUTO: 5 % (ref 4–12)
NEUTS BAND NFR BLD MANUAL: 3 % (ref 0–8)
NEUTS SEG # BLD: 2.79 THOUSAND/UL (ref 1.81–6.82)
NEUTS SEG NFR BLD AUTO: 65 % (ref 43–75)
OVALOCYTES BLD QL SMEAR: PRESENT
PLATELET # BLD AUTO: 108 THOUSANDS/UL (ref 149–390)
PLATELET BLD QL SMEAR: ABNORMAL
PMV BLD AUTO: 7.2 FL (ref 8.9–12.7)
POTASSIUM SERPL-SCNC: 3.8 MMOL/L (ref 3.5–5.3)
PROT SERPL-MCNC: 5.7 G/DL (ref 6.4–8.2)
RBC # BLD AUTO: 3.48 MILLION/UL (ref 3.88–5.62)
RETICS # AUTO: ABNORMAL 10*3/UL (ref 14356–105094)
RETICS # CALC: 8.85 % (ref 0.37–1.87)
SODIUM SERPL-SCNC: 141 MMOL/L (ref 136–145)
TOTAL CELLS COUNTED SPEC: 100
URATE SERPL-MCNC: 4.9 MG/DL (ref 4.2–8)
WBC # BLD AUTO: 4.1 THOUSAND/UL (ref 4.31–10.16)
WBC NRBC COR # BLD: 4.1 THOUSAND/UL (ref 4.31–10.16)

## 2017-10-16 PROCEDURE — 82784 ASSAY IGA/IGD/IGG/IGM EACH: CPT | Performed by: PHYSICIAN ASSISTANT

## 2017-10-16 PROCEDURE — 83615 LACTATE (LD) (LDH) ENZYME: CPT | Performed by: PHYSICIAN ASSISTANT

## 2017-10-16 PROCEDURE — 85027 COMPLETE CBC AUTOMATED: CPT | Performed by: PHYSICIAN ASSISTANT

## 2017-10-16 PROCEDURE — 84550 ASSAY OF BLOOD/URIC ACID: CPT | Performed by: PHYSICIAN ASSISTANT

## 2017-10-16 PROCEDURE — 82232 ASSAY OF BETA-2 PROTEIN: CPT | Performed by: PHYSICIAN ASSISTANT

## 2017-10-16 PROCEDURE — 80053 COMPREHEN METABOLIC PANEL: CPT | Performed by: PHYSICIAN ASSISTANT

## 2017-10-16 PROCEDURE — 82248 BILIRUBIN DIRECT: CPT | Performed by: PHYSICIAN ASSISTANT

## 2017-10-16 PROCEDURE — 85045 AUTOMATED RETICULOCYTE COUNT: CPT | Performed by: PHYSICIAN ASSISTANT

## 2017-10-16 PROCEDURE — 85007 BL SMEAR W/DIFF WBC COUNT: CPT | Performed by: PHYSICIAN ASSISTANT

## 2017-10-16 NOTE — PROGRESS NOTES
Central labs drawn via port  Catheter maintenance performed per protocol without complications  Pt aware of next appointment   Refused AVS

## 2017-10-17 LAB — B2 MICROGLOB SERPL-MCNC: 3.1 MG/L (ref 0.6–2.4)

## 2017-10-18 ENCOUNTER — GENERIC CONVERSION - ENCOUNTER (OUTPATIENT)
Dept: OTHER | Facility: OTHER | Age: 62
End: 2017-10-18

## 2017-10-18 ENCOUNTER — ALLSCRIPTS OFFICE VISIT (OUTPATIENT)
Dept: OTHER | Facility: OTHER | Age: 62
End: 2017-10-18

## 2017-10-23 ENCOUNTER — ALLSCRIPTS OFFICE VISIT (OUTPATIENT)
Dept: OTHER | Facility: OTHER | Age: 62
End: 2017-10-23

## 2017-10-23 ENCOUNTER — HOSPITAL ENCOUNTER (OUTPATIENT)
Dept: INFUSION CENTER | Facility: CLINIC | Age: 62
Discharge: HOME | End: 2017-10-23
Payer: MEDICARE

## 2017-10-23 VITALS
TEMPERATURE: 97.9 F | RESPIRATION RATE: 16 BRPM | DIASTOLIC BLOOD PRESSURE: 83 MMHG | HEART RATE: 74 BPM | SYSTOLIC BLOOD PRESSURE: 140 MMHG

## 2017-10-23 LAB
ALBUMIN SERPL BCP-MCNC: 3.7 G/DL (ref 3.2–5)
ALP SERPL-CCNC: 38 U/L (ref 46–116)
ALT SERPL W P-5'-P-CCNC: 17 U/L (ref 12–78)
ANION GAP SERPL CALCULATED.3IONS-SCNC: 10 MMOL/L (ref 4–13)
ANISOCYTOSIS BLD QL SMEAR: PRESENT
AST SERPL W P-5'-P-CCNC: 22 U/L (ref 5–45)
BASOPHILS # BLD AUTO: 0 THOUSAND/UL (ref 0–0.1)
BASOPHILS NFR MAR MANUAL: 0 % (ref 0–1)
BILIRUB SERPL-MCNC: 0.8 MG/DL (ref 0.2–1)
BUN SERPL-MCNC: 15 MG/DL (ref 5–25)
CALCIUM SERPL-MCNC: 9.4 MG/DL (ref 8.3–10.1)
CHLORIDE SERPL-SCNC: 103 MMOL/L (ref 98–108)
CO2 SERPL-SCNC: 29 MMOL/L (ref 21–32)
CREAT SERPL-MCNC: 1 MG/DL (ref 0.6–1.3)
EOSINOPHIL # BLD AUTO: 0 THOUSAND/UL (ref 0–0.61)
EOSINOPHIL NFR BLD MANUAL: 0 % (ref 0–6)
ERYTHROCYTE [DISTWIDTH] IN BLOOD BY AUTOMATED COUNT: 19.3 % (ref 11.6–15.1)
GFR SERPL CREATININE-BSD FRML MDRD: 80 ML/MIN/1.73SQ M
GLUCOSE SERPL-MCNC: 157 MG/DL (ref 65–140)
HCT VFR BLD AUTO: 32.9 % (ref 36.5–49.3)
HGB BLD-MCNC: 10.1 G/DL (ref 12–17)
LYMPHOCYTES # BLD AUTO: 0.17 THOUSAND/UL (ref 0.6–4.47)
LYMPHOCYTES # BLD AUTO: 4 % (ref 14–44)
MCH RBC QN AUTO: 28.2 PG (ref 26.8–34.3)
MCHC RBC AUTO-ENTMCNC: 30.6 G/DL (ref 31.4–37.4)
MCV RBC AUTO: 92 FL (ref 82–98)
MONOCYTES # BLD AUTO: 0.09 THOUSAND/UL (ref 0–1.22)
MONOCYTES NFR BLD AUTO: 2 % (ref 4–12)
NEUTS BAND NFR BLD MANUAL: 2 % (ref 0–8)
NEUTS SEG # BLD: 4.04 THOUSAND/UL (ref 1.81–6.82)
NEUTS SEG NFR BLD AUTO: 92 % (ref 43–75)
PLATELET # BLD AUTO: 96 THOUSANDS/UL (ref 149–390)
PLATELET BLD QL SMEAR: ABNORMAL
PMV BLD AUTO: 7.1 FL (ref 8.9–12.7)
POTASSIUM SERPL-SCNC: 4.3 MMOL/L (ref 3.5–5.3)
PROT SERPL-MCNC: 5.7 G/DL (ref 6.4–8.2)
RBC # BLD AUTO: 3.58 MILLION/UL (ref 3.88–5.62)
SODIUM SERPL-SCNC: 142 MMOL/L (ref 136–145)
TOTAL CELLS COUNTED SPEC: 100
WBC # BLD AUTO: 4.3 THOUSAND/UL (ref 4.31–10.16)
WBC NRBC COR # BLD: 4.3 THOUSAND/UL (ref 4.31–10.16)

## 2017-10-23 PROCEDURE — 80053 COMPREHEN METABOLIC PANEL: CPT | Performed by: INTERNAL MEDICINE

## 2017-10-23 PROCEDURE — 85027 COMPLETE CBC AUTOMATED: CPT | Performed by: INTERNAL MEDICINE

## 2017-10-23 PROCEDURE — 85007 BL SMEAR W/DIFF WBC COUNT: CPT | Performed by: INTERNAL MEDICINE

## 2017-10-23 RX ORDER — ACETAMINOPHEN 325 MG/1
650 TABLET ORAL ONCE
Status: COMPLETED | OUTPATIENT
Start: 2017-10-24 | End: 2017-10-24

## 2017-10-23 RX ORDER — SODIUM CHLORIDE 9 MG/ML
20 INJECTION, SOLUTION INTRAVENOUS CONTINUOUS
Status: DISCONTINUED | OUTPATIENT
Start: 2017-10-24 | End: 2017-10-27 | Stop reason: HOSPADM

## 2017-10-23 NOTE — PROGRESS NOTES
Assessment  1  Chronic lymphocytic leukemia (204 10) (C91 10)   2  Hemolytic anemia (283 9) (D58 9)   3  Thrombocytopenia (287 5) (D69 6)   4  Joint pain (719 40) (M25 50)    Plan  Chronic lymphocytic leukemia    · Deltasone 20 MG Oral Tablet; two daily with food   Rx By: Nancy Charlton; Dispense: 0 Days ; #:60 Tablet; Refill: 2;For: Chronic lymphocytic leukemia; SHERON = N; Verified Transmission to [facility] #[id number] Last Updated By: System, SureScripts; 8/16/2017 8:42:30 AM   · Follow-up Visit in 4 Weeks Evaluation and Treatment  Follow-up  Status: Complete   Done: 05DCQ1412 09:00AM   Ordered; For: Chronic lymphocytic leukemia; Ordered By: Nancy Charlton Performed:  Due: 84TCD6958; Last Updated By: Zach Coleman; 8/16/2017 8:49:47 AM    Discussion/Summary  Discussion Summary: This is a follow-up visit for 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  Starting July 2017 he has been on prednisone 60 mg daily, folic acid, IBRUTINIB 800 mg daily and Gazyva  This change happened because he started hemolyzing again  Prior to this he was on Venetoclax, Rituxan and prednisone  Patient is responding again  Hemoglobin has come up to 10  He has tiredness  He has exertional dyspnea and has underlying COPD  He has joint pains  No more muscle cramps  No fever, chills or bleeding  CLL was diagnosed several years ago in 2001 and he has gone through several lines of systemic therapy  He had fludarabine-based chemotherapy  He had pentostatin-based chemotherapy  Most recent systemic therapy was a combination of Cytoxan, prednisone and Rituxan  He started to have acute hemolysis and also thrombocytopenia  He was being considered for IBRUTINIB but before that happened he was transferred to [facility] where he was started on Venetoclax at a low dose of 20 mg daily and that was was gradually increased to100 mg daily  CLL was diagnosed in 2001 and treated with FCR  He developed increased hemolysis on FCR  Treatment was changed to PCR  He improved  His most recent chemotherapy treatment was Cytoxan, Oncovin, prednisone and Rituxan and last treatment was in December 2012    In 2013 he was started on Rituxan, prednisone and folic acid because of autoimmune hemolytic anemia  IVIG was tried unsuccessfully  Rituxan controlled the hemolysis but he had reaction to Rituxan ? He had shaking chills and hyperventilation  He was taken to the emergency room, had cultures and was sent home  In January 2017 he was hospitalized with viral esophagitis and he received intravenous acyclovir at home  Prednisone dose is down to 5 mg a day  He is on folic acid  He also has history of heparin induced thrombocytopenia and he does not flush Port-A-Cath with heparin  On 3/20/17 patient found to have hemoglobin of 6 2, ,000  He was admitted to [place] [address] for blood transfusion and plan to transfer to [address] [place] [address]  On 3/20/17 WBC count 195,000, hemoglobin 4 9, platelet count 65  Direct coomb's was positive with undetectable haptoglobin  He was given 2 units of PRBCs, Solu-Medrol 1 g ?3 days and IVIG 1 g/kg daily ? 3 days  His hemoglobin continued to drop  Bone marrow biopsy on 3/21 showed marrow cellularity of greater than 95% almost replaced by small lymphocytes, lambda light chain restricted, CD20 positive, CD19 positive, CD23 positive partially expressing CD11c and CD25 and CD5 positive and negative for CD 79 and CD38  He was treated with single dose of chlorambucil to control his CLL 3/22 second dose of chlorambucil, also Rituxan 375 mg/M ? autoimmune hemolytic anemia  WBC continued to rise, 266,000  Patient initiated with Venetoclax 20 mg daily  Tumor lysis monitored every 8 hours; given aggressive hydration and Lasix and remained euvolemic 3/24? WBC dropped to 112,0003/25- WBC 63,0003/26? WBC 15,000, , platelet count 85,003  Patient became febrile, 101 3   The cefepime initiated Venetoclax held 3/28? patient fell from bed, CT head negative  ANC 1200, cefepime discontinued and Levaquin 75 mg daily started sliding 3/29 given second dose of rituximab 375 mg/m ? 3/30? WBC meg to 14 5 thousand, platelets 81,419, Venetoclax restarted 10 mg every other day3/31 WBC 4 4, , platelet count 27,476 4/1?9/1: WBC maintained less than 10,000, ANC and platelet count meg  He was discharged on Venetoclax 10 mg every other day  He was instructed to discontinue simvastatin, Ranexa, aspirin, metoprolol 50 mg q  day, losartan 50 mg q  day, Plavix 75 mg q  day, folic acid 307 ?g b i d , prednisone 60 mg, allopurinol  He was advised to continue Levaquin 750 mg daily for 10 days, Lexapro 10 mg daily, fenofibrate 50 mg daily, gabapentin 100 mg twice daily, Protonix 40 mg dailyImaging studies performed at [address] [facility] had echocardiogram while inpatient at [facility] on 3/21  This study was unremarkable  CT chest abdomen pelvis without contrast on 3/24 showed stable pulmonary nodules, patchy groundglass densities of lower lobes previously identified and which may represent volume loss or early infiltrate  Persistent diffuse bronchial wall thickening suggesting acute/chronic bronchitis changes  No bronchiectasis  No masses  Stable lymphadenopathy of mediastinum  Stable axillary lymphadenopathy  Splenomegaly 23 9 cm  Extensive lymphadenopathy throughout the entire retroperitoneal, small bowel mesentery, and pelvis  Largest lymph node in right lower quadrant measured 4 6 x 4 8  Stable enlarged left para-aortic lymph node measuring 6 2 x 6 8 4/12/17: Patient received one dose of Rituxan on 4/7/17  Venetoclax 10 mg every other day 4/5/17 - 4/9/17  Venetoclax 10 mg every day beginning 4/10/17  Monitoring CBC 3 times per week  4/28/17: patient had follow-up appointment at [facility] with Dr Concha Romberg  She recommended to slowly increase dose of veneteclax   Also, she recommended as he has had numerous treatments with Rituxan, if antibody directed therapy becomes indicated in the future recommendation is with Gazyva  She will be seeing her on a p r n  basis  Physical examination and test results are as recorded and discussed    Patient is responding to treatments again  He is being continued on prednisone, folic acid, IBRUTINIB and Gazyva  Prednisone dose is being decreased to 40 mg daily  Condition and plan discussed  Questions answered  Counseling Documentation With Imm: The patient, patient's family was counseled regarding diagnostic results,--[id number] for management,--v7mscqjpyza,--e8lztuvgm and family education,--[id number]  total time of encounter was 35 eomuwzz47 minutes was spent counseling  Goals and Barriers: The patient has the current Goals: Treatment of CLL with autoimmune hemolytic anemia  The patent has the current Barriers: Limited options  Patient's Capacity to Self-Care: Patient is able to Self-Care  Medication SE Review and Pt Understands Tx: Possible side effects of new medications were reviewed with the patient/guardian today  The treatment plan was reviewed with the patient/guardian  The patient/guardian understands and agrees with the treatment plan   Self Referrals:   Self Referrals: No   Understands and agrees with treatment plan: The treatment plan was reviewed with the patient/guardian  The patient/guardian understands and agrees with the treatment plan      Chief Complaint  Chief Complaint: Chief Complaint:   The patient presents to the office today with CLL with hemolytic anemia  History of Present Illness  HPI: This is a follow-up visit for 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  Starting July 2017 he has been on prednisone 60 mg daily, folic acid, IBRUTINIB 357 mg daily and Gazyva  This change happened because he started hemolyzing again  Prior to this he was on Venetoclax, Rituxan and prednisone  Patient is responding again  Hemoglobin has come up to 10  He has tiredness  He has exertional dyspnea and has underlying COPD  He has joint pains  No more muscle cramps  No fever, chills or bleeding  CLL was diagnosed several years ago in 2001 and he has gone through several lines of systemic therapy  He had fludarabine-based chemotherapy  He had pentostatin-based chemotherapy  Most recent systemic therapy was a combination of Cytoxan, prednisone and Rituxan  He started to have acute hemolysis and also thrombocytopenia  He was being considered for IBRUTINIB but before that happened he was transferred to [facility] where he was started on Venetoclax at a low dose of 20 mg daily and that was was gradually increased to100 mg daily  CLL was diagnosed in 2001 and treated with FCR  He developed increased hemolysis on FCR  Treatment was changed to PCR  He improved  His most recent chemotherapy treatment was Cytoxan, Oncovin, prednisone and Rituxan and last treatment was in December 2012    In 2013 he was started on Rituxan, prednisone and folic acid because of autoimmune hemolytic anemia  IVIG was tried unsuccessfully  Rituxan controlled the hemolysis but he had reaction to Rituxan ? He had shaking chills and hyperventilation  He was taken to the emergency room, had cultures and was sent home  In January 2017 he was hospitalized with viral esophagitis and he received intravenous acyclovir at home  Prednisone dose is down to 5 mg a day  He is on folic acid  He also has history of heparin induced thrombocytopenia and he does not flush Port-A-Cath with heparin  On 3/20/17 patient found to have hemoglobin of 6 2, ,000  He was admitted to [address] [place] [address] for blood transfusion and plan to transfer to [address] [place] [address]  On 3/20/17 WBC count 195,000, hemoglobin 4 9, platelet count 65  Direct coomb's was positive with undetectable haptoglobin  He was given 2 units of PRBCs, Solu-Medrol 1 g ?3 days and IVIG 1 g/kg daily ? 3 days   His hemoglobin continued to drop  Bone marrow biopsy on 3/21 showed marrow cellularity of greater than 95% almost replaced by small lymphocytes, lambda light chain restricted, CD20 positive, CD19 positive, CD23 positive partially expressing CD11c and CD25 and CD5 positive and negative for CD 79 and CD38  He was treated with single dose of chlorambucil to control his CLL 3/22 second dose of chlorambucil, also Rituxan 375 mg/M ? autoimmune hemolytic anemia  WBC continued to rise, 266,000  Patient initiated with Venetoclax 20 mg daily  Tumor lysis monitored every 8 hours; given aggressive hydration and Lasix and remained euvolemic 3/24? WBC dropped to 112,0003/25- WBC 63,0003/26? WBC 15,000, , platelet count 90,648  Patient became febrile, 101 3  The cefepime initiated Venetoclax held  3/28? patient fell from bed, CT head negative  ANC 1200, cefepime discontinued and Levaquin 75 mg daily started sliding 3/29 given second dose of rituximab 375 mg/m ? 3/30? WBC meg to 14 5 thousand, platelets 84,577, Venetoclax restarted 10 mg every other day3/31 WBC 4 4, , platelet count 50,493 9/6?5/1: WBC maintained less than 10,000, ANC and platelet count meg  He was discharged on Venetoclax 10 mg every other day  He was instructed to discontinue simvastatin, Ranexa, aspirin, metoprolol 50 mg q  day, losartan 50 mg q  day, Plavix 75 mg q  day, folic acid 406 ?g b i d , prednisone 60 mg, allopurinol  He was advised to continue Levaquin 750 mg daily for 10 days, Lexapro 10 mg daily, fenofibrate 50 mg daily, gabapentin 100 mg twice daily, Protonix 40 mg dailyImaging studies performed at [address] [facility] had echocardiogram while inpatient at [facility] on 3/21  This study was unremarkable  CT chest abdomen pelvis without contrast on 3/24 showed stable pulmonary nodules, patchy groundglass densities of lower lobes previously identified and which may represent volume loss or early infiltrate   Persistent diffuse bronchial wall thickening suggesting acute/chronic bronchitis changes  No bronchiectasis  No masses  Stable lymphadenopathy of mediastinum  Stable axillary lymphadenopathy  Splenomegaly 23 9 cm  Extensive lymphadenopathy throughout the entire retroperitoneal, small bowel mesentery, and pelvis  Largest lymph node in right lower quadrant measured 4 6 x 4 8  Stable enlarged left para-aortic lymph node measuring 6 2 x 6 8 4/12/17: Patient received one dose of Rituxan on 4/7/17  Venetoclax 10 mg every other day 4/5/17 - 4/9/17  Venetoclax 10 mg every day beginning 4/10/17  Monitoring CBC 3 times per week  4/28/17: patient had follow-up appointment at [facility] with Dr Tushar Barron  She recommended to slowly increase dose of veneteclax  Also, she recommended as he has had numerous treatments with Rituxan, if antibody directed therapy becomes indicated in the future [facility]  She will be seeing her on a p r n  basis  Interval History: pain resolved  lower extremity swelling improved; has been using furosemide twice weekly as needed, tues/thurs appetite is good, weight is stable sleep is goodhas been taking blood pressure at home; usually takes in the morning; when takes in the afternoon sometimes is higher ie 135/80no more splenic pain      Review of Systems  Complete-Male:   Constitutional: recent weight gainfeeling tired, but--b0no fever,--b0not feeling poorly,--b0no chillsno recent weight loss  Eyes: No complaints of eye pain, no red eyes, no discharge from eyes, no itchy eyes  ENT: no complaints of earache, no hearing loss, no nosebleeds, no nasal discharge, no sore throat, no hoarseness  Cardiovascular: as noted in HPI,--b0the heart rate was not slow,--b0no chest pain,--b0no intermittent leg claudication,--b0the heart rate was not fast,--b0no palpitationsno extremity edema     Respiratory: shortness of breath during exertion, but--b0No complaints of shortness of breath, no wheezing, no cough, no SOB on exertion, no orthopnea or PND,--b0no shortness of breath,--b0no cough,--b0no orthopnea,--b0no wheezingno PND  Gastrointestinal: No complaints of abdominal pain, no constipation, no nausea or vomiting, no diarrhea or bloody stools  Genitourinary: No complaints of dysuria, no incontinence, no hesitancy, no nocturia, no genital lesion, no testicular pain  Musculoskeletal: arthralgiasjoint stiffness, but--b0no joint swelling,--b0no limb pain,--b0no myalgiasno limb swelling  Integumentary: No complaints of skin rash or skin lesions, no itching, no skin wound, no dry skin  Neurological: No compliants of headache, no confusion, no convulsions, no numbness or tingling, no dizziness or fainting, no limb weakness, no difficulty walking  Psychiatric: anxiety, but--b0no personality change,--b0no sleep disturbancesno emotional problems  Endocrine: muscle weaknessfeelings of weakness, but--b0no proptosis,--b0no deepening of the voiceno hot flashes  Hematologic/Lymphatic: a tendency for easy bruisingVascular purpura forearms, but--b0no swollen glands,--b0no tendency for easy bleedingno swollen glands in the neck  ROS Reviewed:   ROS reviewed  Active Problems  1  Abdominal pain (789 00) (R10 9)   2  Anemia (285 9) (D64 9)   3  Bronchitis (490) (J40)   4  Chemotherapy-induced nausea (787 02,E933 1) (R11 0,T45  1X5A)   5  Chronic lymphocytic leukemia (204 10) (C91 10)   6  Chronic maxillary sinusitis (473 0) (J32 0)   7  Coronary artery disease (414 00) (I25 10)   8  Dyslipidemia (272 4) (E78 5)   9  Dysphagia (787 20) (R13 10)   10  Esophagitis, reflux (530 11) (K21 0)   11  Heart disease (429 9) (I51 9)   12  Hemolytic anemia (283 9) (D58 9)   13  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,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)   14  History of hemolytic anemia (V12 3) (Z86 2)   15  Joint pain (719 40) (M25 50)   16  Leukopenia (288 50) (D72 819)   17  Lower extremity edema (782 3) (R60 0)   18  Muscle Cramps (729 82)   19   Neutropenia (288 00) (D70 9)   20  Thrombocytopenia (287 5) (D69 6)      Reviewed     Past Medical History  1  History of Acute myocardial infarction (410 90) (I21 3)   2  History of Coronary Artery Disease (V12 59)   3  History of Dyslipidemia (272 4) (E78 5)   4  History of acute bronchitis (V12 69) (Z87 09)   5  History of hemolytic anemia (V12 3) (Z86 2)   6  History of hypertension (V12 59) (Z86 79)   7  History of neutropenia (V12 3) (Z86 2)   8  History of thrombocytopenia (V12 3) (Z86 2)      Reviewed     Surgical History  1  History of Cardiac Cath Lesion 1, 1st Adjunct Treat Device: Stent   2  History of Foot Surgery   3  History of Hand Surgery   4  History of Knee Surgery  Surgical History Reviewed: The surgical history was reviewed and updated today  Family History  Mother    1  Family history of Chronic Leukemia (V16 6)   2  Denied: Family history of Colon cancer   3  Denied: Family history of Crohn's disease   4  Denied: Family history of liver disease   5  Family history of Polyps Of The Sigmoid Colon  Father    6  Denied: Family history of Colon cancer   7  Denied: Family history of Crohn's disease   8  Denied: Family history of liver disease  Family History Reviewed: The family history was reviewed and updated today  Social History   · Being A Social Drinker   · Current every day smoker (305 1) (F17 200)  Social History Reviewed: The social history was reviewed and updated today  Current Meds   1  Acyclovir 400 MG Oral Tablet; 1 tab po bid; Therapy: 31CHQ5619 to (Last Rx:15Mar2017)  Requested for: 39QOI1333 Ordered   2  Acyclovir TABS; 600mg twice a day; Therapy: (Recorded:13Ipf0219) to Recorded   3  Allopurinol 100 MG Oral Tablet; 2 daily  Stop if rash; Therapy: 71QZR9100 to (Last Rx:01Pct4449)  Requested for: 66Ybe7526 Ordered   4  Benzonatate 200 MG Oral Capsule; TAKE 1 CAPSULE 3 TIMES DAILY AS NEEDED;    Therapy: 20UIT3043 to (Evaluate:07Hly4555)  Requested for: 58UAB6235; Last Rx: 86OCV0408 Ordered   5  Clopidogrel Bisulfate 75 MG Oral Tablet; Take 1 tablet daily Recorded   6  Clotrimazole 10 MG Mouth/Throat Cindy; ALLOW 1 CINDY TO SLOWLY DISSOLVE IN   MOUTH  4 TIMES A DAY; Therapy: 15ZSU3685 to (Evaluate:51Ncv7718)  Requested for: 65ZTP0775; Last   Rx:15Mar2017 Ordered   7  Fenofibrate 50 MG Oral Capsule; TAKE 1 CAPSULE DAILY WITH A MEAL; Therapy: (Recorded:05Apr2017) to Recorded   8  Folic Acid 1 MG Oral Tablet; Therapy: (Recorded:07Mar2014) to Recorded   9  Gabapentin 100 MG Oral Capsule; TAKE 1 CAPSULE 3 TIMES DAILY; Therapy: 89ALL7754 to (Viveca Boas)  Requested for: 66Nqh5729; Last   Rx:68Njv8481 Ordered   10  GNP Potassium Gluconate 595 (99 K) MG Oral Tablet; Take 1 tablet daily Recorded   11  LevoFLOXacin 750 MG Oral Tablet; TAKE AS DIRECTED; Therapy: (Recorded:05Apr2017) to Recorded   12  Lexapro 10 MG Oral Tablet; TAKE 1 TABLET DAILY; Therapy: (Recorded:05Apr2017) to Recorded   13  Lidocaine-Prilocaine 2 5-2 5 % External Cream; APPLY 1 INCH TO IV SITE PRIOR TO    CHEMO TREATMENT; Therapy: 85QCU6185 to (Last Rx:80Kai9624)  Requested for: 13Nzs8734 Ordered   14  Lisinopril 20 MG Oral Tablet; Take 1 tablet daily Recorded   15  Metoprolol Tartrate 50 MG Oral Tablet; Therapy: 96KND9994 to (Evaluate:25Yjk0884) Recorded   16  Ondansetron 8 MG Oral Tablet Disintegrating; 1 tab PO Q 8 hr PRN nausea; Therapy: 21PWR1018 to (Last Rx:15Mar2017)  Requested for: 86CLD3289 Ordered   17  Pantoprazole Sodium 40 MG Oral Tablet Delayed Release; TAKE 1 TABLET TWICE A DAY; Therapy: 82ZPS9617 to (Last Rx:11Phb5677)  Requested for: 41Cre0229 Ordered   18  Plavix 75 MG Oral Tablet; Therapy: (Recorded:77Bav2504) to Recorded   19  PredniSONE 10 MG Oral Tablet; one daily with food; Therapy: 18LEU6780 to (Louann Lanes)  Requested for: 74BEZ0734; Last    Rx:30Nov2016 Ordered   20   PredniSONE 20 MG Oral Tablet; TAKE 2 TABLETS DAILY WITH FOOD AS DIRECTED; Therapy: 77MQT5580 to (Evaluate:33Uzb6130)  Requested for: 92TLR8252; Last    Rx:89Dsw6762 Ordered   21  PredniSONE 5 MG Oral Tablet; 1 by mouth daily or as directed  Take with food     Requested for: 70YOO1095; Last Rx:14Mwu4480 Ordered   22  Ranexa 500 MG Oral Tablet Extended Release 12 Hour; Therapy: (Recorded:02Jan2013) to Recorded   23  TraMADol HCl - 50 MG Oral Tablet; TAKE 1 TABLET EVERY 6 HOURS AS NEEDED FOR    ONGOING PAIN;    Therapy: 93MXO9653 to (Last Rx:28Xxz1849) Ordered   24  ValACYclovir HCl - 1 GM Oral Tablet; Therapy: (Recorded:30Nov2016) to Recorded   25  Venclexta 10 MG Oral Tablet; 12 tabs po daily; Therapy: 14Apr2017 to (Last Rx:54Iqb0186)  Requested for: 12OHA2105 Ordered   26  Ventolin  (90 Base) MCG/ACT Inhalation Aerosol Solution; INHALE 1 TO 2 PUFFS    EVERY 4 TO 6 HOURS AS NEEDED; Therapy: 21DVM6667 to (Katelynn Joel)  Requested for: 40OIU3097; Last    Rx:69Snx4340 Ordered  Medication List Reviewed: The medication list was reviewed and updated today  Allergies  1  Heparin   2  Macrolides      Reviewed  History of HIT     Vitals    Reviewed  Physical Exam      Patient is alert and oriented and not in  distress  Stable vital signs  No icterus  No oral thrush  No palpable neck mass  Lung fields are clear to percussion and auscultation  Heart rate is regular  Abdomen soft and nontender  No palpable abdominal mass  No ascites  No edema of ankles  No calf tenderness  No focal neurological deficit  No skin rash  Vascular purpura forearms's  Decreased muscle mass  No palpable lymphadenopathy  Good arterial pulses  No clubbing  Anxious  Performance status one        ECOG 1       Results/Data   14 Aug 2017 8:26 AM    (1) CBC/PLT/DIFF        WBC COUNT 3 10        WBC ADJUSTED 3 10        RBC COUNT 2 80        HEMOGLOBIN 10 0        HEMATOCRIT 30 7                MCH 35 6        MCHC 32 5        RDW 14 1        MPV 7 0        PLATELET COUNT 80 14 Aug 2017 8:26 AM    (1) COMPREHENSIVE METABOLIC PANEL        SODIUM 134        POTASSIUM 3 9        GLUCOSE,RANDM 96        CHLORIDE 108        CARBON DIOXIDE 27        ANION GAP (CALC) -1        BLOOD UREA NITROGEN 15        CREATININE 1 10        CALCIUM 8 4        AST(SGOT) 22        ALT (SGPT) 15        ALK PHOSPHATAS 33        TOTAL PROTEIN 5 3        ALBUMIN 3 6        BILI, TOTAL 1 70        eGFR 72   14 Aug 2017 8:26 AM    (1) CBC/PLT/DIFF        TOTAL COUNTED 100        Anisocytosis Present        Macrocytosis Present        SEGS %(DIFF) 75        BAND %(DIFF) 2        LYMPHOCYTES %(DIFF) 19        MONOCYTES %(DIFF) 2        EOSINOPHILS %(DIFF) 0        BASOPHILS %(DIFF) 0        METAMYELOCYTES %(DIFF) 1        MYELOCYTES %(DIFF) 1        NEUTROPHILS ABS CT 2 39        LYMPHOCYTES ABS CT 0 59        MONOCYTES ABS CT 0 06        EOSINOPHILS ABS CT 0 00        BASOPHILS ABS CT 0 00        PLT ESTIMATE Decreased        TEAR DROP Present   14 Aug 2017 8:26 AM    (1) RETICULOCYTE COUNT        RETIC ABS # [id number]        RETIC % 7 90   14 Aug 2017 8:26 AM    (1) URIC ACID        URIC ACID 4 0   14 Aug 2017 8:26 AM    (1) BILIRUBIN, DIRECT        BILI, DIRECT 0 40   14 Aug 2017 8:26 AM    (1) LD (LDH)        LD (LDH) 153   14 Aug 2017 8:26 AM    (1) IGG        GAMMAGLOBULIN;  0   14 Aug 2017 8:26 AM    (1) B-2 MICROGLOBULIN        B-2 MICROGLOBUL 2 5   22 May 2017 8:55 AM    (1) B-2 MICROGLOBULIN        B-2 MICROGLOBUL 5 4    Health Management  Esophagitis, reflux   EGD; every 3 months; Last 95Lpl4837; Next Due: 17EYQ1779; Overdue    Future Appointments    Date/Time Provider Specialty Site   08/21/2017 03:30 PM TYSHAWN Lu   Infectious Disease [facility] INFECTIOUS DISEASE   09/20/2017 09:00 AM Tiff Mejia MD Hematology [facility]     Signatures   Electronically signed by : Derk Dakin, MD; Aug 18 2017  5:14AM EST                       (Author)

## 2017-10-23 NOTE — PROGRESS NOTES
Patient stated he continues to have pain in his head from previous cellulitis of head and scalp  Antibiotic to be completed today  Takes pain meds as ordered  Encouraged to notify physician if pain meds ineffective or any other symptoms   Verbalized understanding

## 2017-10-23 NOTE — PROGRESS NOTES
Patient arrived on unit this am for central labs  Port accessed with 20G X1 inch bashir needle  Port flushed and patient repositioned several times however no blood return obtained  Portacath reaccessed with a 19G X1 inch bashir needle and again flushed several times and repositioned patient however no blood return obtained  CBC/CMP drawn peripherally  Patient had another  appointment and was unable to have cathflow administered to port today  Patient is due for chemotherapy on 10/24/17 at which time he is aware that if no blood return obtained on access of portacath, we will have to instill cathflow prior to chemotherapy  Patient verbalized understanding

## 2017-10-24 ENCOUNTER — HOSPITAL ENCOUNTER (OUTPATIENT)
Dept: INFUSION CENTER | Facility: CLINIC | Age: 62
Discharge: HOME | End: 2017-10-24
Payer: MEDICARE

## 2017-10-24 VITALS
WEIGHT: 211.64 LBS | SYSTOLIC BLOOD PRESSURE: 153 MMHG | HEIGHT: 73 IN | BODY MASS INDEX: 28.05 KG/M2 | TEMPERATURE: 97.4 F | HEART RATE: 84 BPM | RESPIRATION RATE: 16 BRPM | DIASTOLIC BLOOD PRESSURE: 77 MMHG

## 2017-10-24 PROCEDURE — 96413 CHEMO IV INFUSION 1 HR: CPT

## 2017-10-24 PROCEDURE — 36593 DECLOT VASCULAR DEVICE: CPT

## 2017-10-24 PROCEDURE — 96367 TX/PROPH/DG ADDL SEQ IV INF: CPT

## 2017-10-24 PROCEDURE — 96415 CHEMO IV INFUSION ADDL HR: CPT

## 2017-10-24 RX ORDER — DOXYCYCLINE HYCLATE 100 MG/1
100 TABLET, DELAYED RELEASE ORAL DAILY
COMMUNITY
End: 2017-12-19 | Stop reason: ALTCHOICE

## 2017-10-24 RX ADMIN — DIPHENHYDRAMINE HYDROCHLORIDE 25 MG: 50 INJECTION, SOLUTION INTRAMUSCULAR; INTRAVENOUS at 10:16

## 2017-10-24 RX ADMIN — DEXAMETHASONE SODIUM PHOSPHATE 20 MG: 10 INJECTION, SOLUTION INTRAMUSCULAR; INTRAVENOUS at 09:42

## 2017-10-24 RX ADMIN — ALTEPLASE 2 MG: 2.2 INJECTION, POWDER, LYOPHILIZED, FOR SOLUTION INTRAVENOUS at 08:30

## 2017-10-24 RX ADMIN — OBINUTUZUMAB 1000 MG: 1000 INJECTION, SOLUTION, CONCENTRATE INTRAVENOUS at 10:57

## 2017-10-24 RX ADMIN — ACETAMINOPHEN 650 MG: 325 TABLET, FILM COATED ORAL at 09:39

## 2017-10-24 RX ADMIN — SODIUM CHLORIDE 20 ML/HR: 0.9 INJECTION, SOLUTION INTRAVENOUS at 09:35

## 2017-10-24 NOTE — PROGRESS NOTES
Patient's port  accessed - rodger blood return aftyer flushing several times and repositioning  Cath flow instilled   Will monitor effectiveness

## 2017-10-24 NOTE — PLAN OF CARE
Problem: Potential for Falls  Goal: Patient will remain free of falls  INTERVENTIONS:  - Assess patient frequently for physical needs  -  Identify cognitive and physical deficits and behaviors that affect risk of falls    -  [place] fall precautions as indicated by assessment   - Educate patient/family on patient safety including physical limitations  - Instruct patient to call for assistance with activity based on assessment  - Modify environment to reduce risk of injury  - Consider OT/PT consult to assist with strengthening/mobility   Outcome: Progressing

## 2017-10-24 NOTE — PROGRESS NOTES
Patient tolerated chemotherapy  Denies any discomforts  AVS given to patient   Aware of next appointment

## 2017-10-27 NOTE — PROGRESS NOTES
Assessment  1  Chronic lymphocytic leukemia (204 10) (C91 10)   2  Hemolytic anemia (283 9) (D58 9)   3  Thrombocytopenia (287 5) (D69 6)   4  Cellulitis of head or scalp (682 8) (L03 811)   5  Hyperlipidemia (272 4) (E78 5)    Plan  Cellulitis of head or scalp    · Doxycycline Hyclate 100 MG Oral Tablet; TAKE 1 TABLET DAILY AS DIRECTED   Rx By: Winston Gaspar; Dispense: 30 Days ; #:30 Tablet; Refill: 0;For: Cellulitis of head or scalp; SHERON = N; Verified Transmission to [facility]PH[facility]ACY #[id number] Last Updated By: System, [employer]; 10/18/2017 10:20:28 AM  Hyperlipidemia    · (1) LIPID PANEL, FASTING; Status:Active; Requested EPH:[id number];    Perform:[facility]; Due:49Xfa8902; Ordered;For:Hyperlipidemia; Ordered By:Lyndsay Diaz; Thrombocytopenia    · Follow-up visit in 1 month Evaluation and Treatment  Follow-up  Status: Complete  Done:  [id number] 08:00AM   Ordered; For: Thrombocytopenia; Ordered By: Winston Gaspar Performed:  Due: [id number]; Last Updated By: Melania Peng; 10/18/2017 10:27:54 AM    Discussion/Summary  Discussion Summary:   60year old male presents for follow-up for CLL  CLL with history of hemolytic anemia- history outlined in the HPI  Presented he is on ibrutinib 280 mg PO daily  Dose was reduced due to neutropenia  He will be receiving his shipment tomorrow and has funding for 1 year from drug company  He continues on prednisone 20 mg PO daily and folic acid  Also, he is on treatment with Gazyva 1000 mg IV every 4 weeks  recent onset diabetes - due to chronic steroid use  He is seeing endocrinology  He will be going to education class in future  cellulitis on scalp- he is completing oral antibiotics, Keflex and Doxycycline  Will continue him on 100 mg doxycycline to try to prevent recurrence in his immunocompromised state  He has pain associated with this   Advised to continue to use Percocet as needed as well as OTC analgesia    voiced understanding and agreement in the above discussion  Aware to contact us with questions/symptoms in the interim  Counseling Documentation With Imm: The patient, patient's family was counseled regarding diagnostic results,-- instructions for management,-- risk factor reductions,-- prognosis,-- patient and family education,-- impressions,-- importance of compliance with treatment  total time of encounter was 30 minutes-- and-- 20 minutes was spent counseling  Goals and Barriers: The patient has the current Goals: Treatment of CLL with autoimmune hemolytic anemia  The patent has the current Barriers: Limited options  Patient's Capacity to Self-Care: Patient is able to Self-Care  Medication SE Review and Pt Understands Tx: Possible side effects of new medications were reviewed with the patient/guardian today  The treatment plan was reviewed with the patient/guardian  The patient/guardian understands and agrees with the treatment plan   Self Referrals:   Self Referrals: No   Understands and agrees with treatment plan: The treatment plan was reviewed with the patient/guardian  The patient/guardian understands and agrees with the treatment plan      History of Present Illness  HPI: CLL was diagnosed in 2001 and treated with FCR  He developed increased hemolysis on FCR  Treatment was changed to PCR  He improved  also has history of heparin induced thrombocytopenia and he does not flush Port-A-Cath with heparin  2012 chemotherapy treatment was Cytoxan, Oncovin, prednisone and Rituxan  In 2013 he was started on Rituxan, prednisone and folic acid because of autoimmune hemolytic anemia  IVIG was tried unsuccessfully  Rituxan controlled the hemolysis but he had reaction to Rituxan ? He had shaking chills and hyperventilation  He was taken to the emergency room, had cultures and was sent home  In January 2017 he was hospitalized with viral esophagitis and he received intravenous acyclovir at home  Prednisone dose is down to 5 mg a day   He is on folic acid  3/20/17 patient found to have hemoglobin of 6 2, ,000  He was admitted to [facility] for blood transfusion and plan to transfer to [address] [place] [address]  3/20/17 WBC count 195,000, hemoglobin 4 9, platelet count 65  Direct coomb's was positive with undetectable haptoglobin  He was given 2 units of PRBCs, Solu-Medrol 1 g Ã3 days and IVIG 1 g/kg daily Ã3 days  His hemoglobin continued to drop  Bone marrow biopsy on 3/21 showed marrow cellularity of greater than 95% almost replaced by small lymphocytes, lambda light chain restricted, CD20 positive, CD19 positive, CD23 positive partially expressing CD11c and CD25 and CD5 positive and negative for CD 79 and CD38  was treated with single dose of chlorambucil to control his CLL  second dose of chlorambucil, also Rituxan 375 mg/M Â² autoimmune hemolytic anemia  WBC continued to rise, 266,000  initiated with Venetoclax 20 mg daily  Tumor lysis monitored every 8 hours; given aggressive hydration and Lasix and remained euvolemic dropped to 112,000WBC 63,07862,000, , platelet count 08,609  Patient became febrile, 101 3  The cefepime initiated Venetoclax held  fell from bed, CT head negative  ANC 1200, cefepime discontinued and Levaquin 75 mg daily started sliding 3/29 given second dose of rituximab 375 mg/m Â² meg to 14 5 thousand, platelets 93,865, Venetoclax restarted 10 mg every other dayWBC 4 4, , platelet count 09,583  WBC maintained less than 10,000, ANC and platelet count meg  He was discharged on Venetoclax 10 mg every other day  He was instructed to discontinue simvastatin, Ranexa, aspirin, metoprolol 50 mg q  day, losartan 50 mg q  day, Plavix 75 mg q  day, folic acid 249 Âµg b i d , prednisone 60 mg, allopurinol  was advised to continue Levaquin 750 mg daily for 10 days, Lexapro 10 mg daily, fenofibrate 50 mg daily, gabapentin 100 mg twice daily, Protonix 40 mg daily  studies performed at [address] [place] [address] echocardiogram while inpatient at [facility] on 3/21  This study was unremarkable  chest abdomen pelvis without contrast on 3/24 showed stable pulmonary nodules, patchy groundglass densities of lower lobes previously identified and which may represent volume loss or early infiltrate  Persistent diffuse bronchial wall thickening suggesting acute/chronic bronchitis changes  No bronchiectasis  No masses  Stable lymphadenopathy of mediastinum  Stable axillary lymphadenopathy  Splenomegaly 23 9 cm  Extensive lymphadenopathy throughout the entire retroperitoneal, small bowel mesentery, and pelvis  Largest lymph node in right lower quadrant measured 4 6 x 4 8  Stable enlarged left para-aortic lymph node measuring 6 2 x 6  8  Patient received one dose of Rituxan on 4/7/17  Venetoclax 10 mg every other day 4/5/17 - 4/9/17  Venetoclax 10 mg every day beginning 4/10/17  Monitoring CBC 3 times per week  patient had follow-up appointment at [facility] with Dr Kelby Najjar  She recommended to slowly increase dose of veneteclax  Also, she recommended as he has had numerous treatments with Rituxan, if antibody directed therapy becomes indicated in the future recommendation was with [facility]  She will be seeing her on a p r n  basis  2017- Hemolysis  Disease not under control with veneteclax  Started prednisone 60 mg daily, folic acid, IBRUTINIB 638 mg daily and Gazyva  2017- 9/18-9/20 patient was admitted due to uncontrolled hyperglycemia with new onset DM type II due to chronic prednisone use for CLL/hemolytic anemia; also found to have profound neutropenia   Ibrutinib dose was reduced to 280 mg daily 2017- 10/7/17 - 10/14/17 patient was admitted due to cellulitis on his scalp    Interval History: doxycycline x 1 week x 1 week   been on steroid 20 mg - has been off for 2 weeks; receiving shipment tomorrow      Review of Systems  Complete-Male:   Constitutional: No fever or chills, feels well, no tiredness, no recent weight gain or weight loss  Eyes: No complaints of eye pain, no red eyes, no discharge from eyes, no itchy eyes  ENT: no complaints of earache, no hearing loss, no nosebleeds, no nasal discharge, no sore throat, no hoarseness  Cardiovascular: No complaints of slow heart rate, no fast heart rate, no chest pain, no palpitations, no leg claudication, no lower extremity  Respiratory: No complaints of shortness of breath, no wheezing, no cough, no SOB on exertion, no orthopnea or PND  Gastrointestinal: No complaints of abdominal pain, no constipation, no nausea or vomiting, no diarrhea or bloody stools  Genitourinary: No complaints of dysuria, no incontinence, no hesitancy, no nocturia, no genital lesion, no testicular pain  Musculoskeletal: No complaints of arthralgia, no myalgias, no joint swelling or stiffness, no limb pain or swelling  Integumentary: pain on skin on scalp  Neurological: No compliants of headache, no confusion, no convulsions, no numbness or tingling, no dizziness or fainting, no limb weakness, no difficulty walking  Psychiatric: no anxiety-- and-- no depression  Endocrine: no muscle weakness-- and-- no feelings of weakness  Hematologic/Lymphatic: No complaints of swollen glands, no swollen glands in the neck, does not bleed easily, no easy bruising  ROS Reviewed:   ROS reviewed  Active Problems  1  Abdominal pain (789 00) (R10 9)   2  Anemia (285 9) (D64 9)   3  Cellulitis of hand without finger or thumb, right (682 4) (L03 113)   4  Chemotherapy-induced nausea (787 02,E933 1) (R11 0,T45  1X5A)   5  Chronic kidney disease, unspecified CKD stage (585 9) (N18 9)   6  Chronic lymphocytic leukemia (204 10) (C91 10)   7  Chronic maxillary sinusitis (473 0) (J32 0)   8  Coronary artery disease (414 00) (I25 10)   9  Dyslipidemia (272 4) (E78 5)   10  Dysphagia (787 20) (R13 10)   11  Esophagitis, reflux (530 11) (K21 0)   12  Heart disease (429 9) (I51 9)   13   Hemolytic anemia (283 9) (D58 9)   14  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,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)   15  History of hemolytic anemia (V12 3) (Z86 2)   16  Hypokalemia (276 8) (E87 6)   17  Joint pain (719 40) (M25 50)   18  Leukopenia (288 50) (D72 819)   19  Lower extremity edema (782 3) (R60 0)   20  Muscle Cramps (729 82)   21  Neutropenia (288 00) (D70 9)   22  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) (R76 12)   23  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,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,T38 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)   24  Thrombocytopenia (287 5) (D69 6)   25  Ulcer of esophagus without bleeding (530 20) (K22 10)    Past Medical History  1  History of Acute myocardial infarction (410 90) (I21 9)   2  History of Coronary Artery Disease (V12 59)   3  History of Dyslipidemia (272 4) (E78 5)   4  History of acute bronchitis (V12 69) (Z87 09)   5  History of bronchitis (V12 69) (Z87 09)   6  History of hemolytic anemia (V12 3) (Z86 2)   7  History of hypertension (V12 59) (Z86 79)   8  History of neutropenia (V12 3) (Z86 2)   9  History of thrombocytopenia (V12 3) (Z86 2)    Surgical History  1  History of Cardiac Cath Lesion 1, 1st Adjunct Treat Device: Stent   2  History of Foot Surgery   3  History of Hand Surgery   4  History of Knee Surgery  Surgical History Reviewed: The surgical history was reviewed and updated today  Family History  Mother    1  Family history of Chronic Leukemia (V16 6)   2  Denied: Family history of Colon cancer   3  Denied: Family history of colon cancer   4  Denied: Family history of Crohn's disease   5  Denied: Family history of liver disease   6  Family history of Polyps Of The Sigmoid Colon  Father    7  Denied: Family history of Colon cancer   8  Denied: Family history of colon cancer   9  Denied: Family history of Crohn's disease   10  Denied: Family history of liver disease  Family History Reviewed: The family history was reviewed and updated today         Social History   · Being A Social Drinker   · Current every day smoker (305 1) (Y90 010)  Social History Reviewed: The social history was reviewed and updated today  The social history was reviewed and is unchanged  Current Meds   1  Aspirin 81 MG Oral Tablet Delayed Release; Therapy: 11Sep2017 to Recorded   2  Benzonatate 200 MG Oral Capsule; TAKE 1 CAPSULE 3 TIMES DAILY AS NEEDED; Therapy: 52AOT2022 to (Evaluate:07Oct2017)  Requested for: 14Arj3854; Last   Rx:07Sep2017 Ordered   3  Carafate 1 GM/10ML Oral Suspension; TAKE 10 ML 4 TIMES DAILY; Therapy: 04QGS8702 to (Cosmo Bush)  Requested for: 22LUA5869; Last   Rx:05Oct2017 Ordered   4  Citalopram Hydrobromide 40 MG Oral Tablet; Therapy: 11Sep2017 to Recorded   5  Clotrimazole 10 MG Mouth/Throat Cindy; ALLOW 1 CINDY TO SLOWLY DISSOLVE IN   MOUTH  4 TIMES A DAY; Therapy: 99SCX0027 to (Evaluate:14May2017)  Requested for: 84TUI1653; Last   Rx:15Mar2017 Ordered   6  Doxycycline Hyclate 100 MG Oral Tablet; TAKE 1 TABLET EVERY 12 HOURS DAILY; Therapy: 77JCE1479 to (Evaluate:15Sep2017)  Requested for: 94Zoq4356; Last   Rx:05Sep2017 Ordered   7  Fenofibrate 50 MG Oral Capsule; TAKE 1 CAPSULE DAILY WITH A MEAL; Therapy: (Recorded:05Apr2017) to Recorded   8  Folic Acid 1 MG Oral Tablet; Therapy: (Recorded:07Mar2014) to Recorded   9  Gabapentin 100 MG Oral Capsule; TAKE 1 CAPSULE 3 TIMES DAILY; Therapy: 41WGL0577 to (Lisa Parra)  Requested for: 93Olw3445; Last   Rx:99Bsa1508 Ordered   10  Gazyva 1000 MG/40ML Intravenous Solution; Therapy: 11Sep2017 to Recorded   11  GNP Potassium Gluconate 595 (99 K) MG Oral Tablet; Take 1 tablet daily Recorded   12  Imbruvica 140 MG Oral Capsule; TAKE 2 CAPSULE Daily; Therapy: 27Vux6808 to (Evaluate:08Jan2018)  Requested for: 85BER1119; Last    Rx:10Oct2017 Ordered   13  Keflex 500 MG Oral Capsule; Therapy: (Recorded:18Oct2017) to Recorded   14  Ondansetron 8 MG Oral Tablet Disintegrating; 1 tab PO Q 8 hr PRN nausea;     Therapy: 69KCP6746 to (Last Rx:15Mar2017) Requested for: 76QNT0207 Ordered   15  Pantoprazole Sodium 40 MG Oral Tablet Delayed Release; TAKE 1 TABLET TWICE A DAY; Therapy: 23SJD1638 to (Last Rx:94Ukh6821)  Requested for: 62Xyk8235 Ordered   16  Percocet 5-325 MG Oral Tablet; Therapy: (Recorded:18Oct2017) to Recorded   17  Potassium Chloride ER 10 MEQ Oral Tablet Extended Release; one daily  or as advised; Therapy: 06IJZ6084 to (77 873 135)  Requested for: 34JTU9490; Last    Rx:29Rpa6073 Ordered   18  PredniSONE 20 MG Oral Tablet; TAKE 2 TABLETS DAILY WITH FOOD AS DIRECTED; Therapy: 04MZX0547 to (Evaluate:26Cfu8098)  Requested for: 99JEN0230; Last    Rx:99Vug2508 Ordered   19  SLPG Magic Mouthwash 1:1:1 maalox/diphenhydramine/lidocaine; 15 ml PO q6 prn; Therapy: 57CPE4330 to (Evaluate:38Jsk3732); Last Rx:97Gen6981 Ordered   20  TraMADol HCl - 50 MG Oral Tablet; TAKE 1 TABLET EVERY 6 HOURS AS NEEDED FOR    ONGOING PAIN;    Therapy: 09BLR4435 to (Last Rx:07Wzr7952) Ordered  Medication List Reviewed: The medication list was reviewed and updated today  Allergies  1  Heparin    Vitals  Vital Signs    Recorded: 75GXM7639 09:46AM   Temperature 98 3 F   Heart Rate 79   Respiration 18   Systolic 498   Diastolic 72   Height 6 ft    Weight 205 lb    BMI Calculated 27 8   BSA Calculated 2 15   O2 Saturation 98   Pain Scale 4     Physical Exam    Constitutional   General appearance: No acute distress, well appearing and well nourished  Eyes   Conjunctiva and lids: No swelling, erythema, or discharge  Ears, Nose, Mouth, and Throat   External inspection of ears and nose: Normal     Oropharynx: Normal with no erythema, edema, exudate or lesions  Pulmonary   Respiratory effort: No increased work of breathing or signs of respiratory distress  Auscultation of lungs: Clear to auscultation, equal breath sounds bilaterally, no wheezes, no rales, no rhonci      Cardiovascular   Auscultation of heart: Normal rate and rhythm, normal S1 and S2, without murmurs  Examination of extremities for edema and/or varicosities: Normal     Abdomen   Abdomen: Non-tender, no masses  Liver and spleen: No hepatomegaly or splenomegaly  Lymphatic   Palpation of lymph nodes in neck: No lymphadenopathy  Musculoskeletal   Gait and station: Normal     Skin   Skin and subcutaneous tissue: Abnormal  -- discrete  Results/Data   (1) CBC/PLT/DIFF 16Oct2017 08:40AM Karli Spann     Test Name Result Flag Reference   WBC COUNT 4 10 Thousand/uL L 4 31-10 16   WBC ADJUSTED 4 10 Thousand/uL L 4 31-10 16   RBC COUNT 3 48 Million/uL L 3 88-5 62   HEMOGLOBIN 9 7 g/dL L 12 0-17 0   HEMATOCRIT 31 2 % L 36 5-49 3   MCV 90 fL  82-98   MCH 28 0 pg  26 8-34 3   MCHC 31 2 g/dL L 31 4-37 4   RDW 15 7 % H 11 6-15 1   MPV 7 2 fL L 8 9-12 7   PLATELET COUNT 557 Thousands/uL L 149-390     (1) CBC/PLT/DIFF 16Oct2017 08:40AM Lyndsay Diaz     Test Name Result Flag Reference   TOTAL COUNTED 100     Anisocytosis Present     Ovalocytosis Present     PLT ESTIMATE Borderline A Adequate   SEGS %(DIFF) 65 %  43-75   BAND %(DIFF) 3 %  0-8   LYMPHOCYTES %(DIFF) 21 %  14-44   MONOCYTES %(DIFF) 5 %  4-12   EOSINOPHILS %(DIFF) 5 %  0-6   BASOPHILS %(DIFF) 0 %  0-1   METAMYELOCYTES %(DIFF) 1 %  0-1   NEUTROPHILS ABS CT 2 79 Thousand/uL  1 81-6 82   LYMPHOCYTES ABS CT 0 86 Thousand/uL  0 60-4 47   MONOCYTES ABS CT 0 21 Thousand/uL  0 00-1 22   EOSINOPHILS ABS CT 0 21 Thousand/uL  0 00-0 61   BASOPHILS ABS CT 0 00 Thousand/uL  0 00-0 10     (1) COMPREHENSIVE METABOLIC PANEL 68UIV2903 47:47XT Lyndsay Diaz     Test Name Result Flag Reference   GLUCOSE,RANDM 108 mg/dL     If the patient is fasting, the ADA then defines impaired fasting glucose as > 100 mg/dL and diabetes as > or equal to 123 mg/dL  Specimen collection should occur prior to Sulfasalazine administration due to the potential for falsely depressed results   Specimen collection should occur prior to Sulfapyridine administration due to the potential for falsely elevated results  SODIUM 141 mmol/L  136-145   POTASSIUM 3 8 mmol/L  3 5-5 3   CHLORIDE 100 mmol/L     CARBON DIOXIDE 28 mmol/L  21-32   ANION GAP (CALC) 13 mmol/L  4-13   BLOOD UREA NITROGEN 20 mg/dL  5-25   CREATININE 1 30 mg/dL  0 60-1 30   Standardized to IDMS reference method   CALCIUM 9 5 mg/dL  8 3-10 1   BILI, TOTAL 0 90 mg/dL  0 20-1 00   ALK PHOSPHATAS 31 U/L L    ALT (SGPT) 17 U/L  12-78   Specimen collection should occur prior to Sulfasalazine administration due to the potential for falsely depressed results  AST(SGOT) 24 U/L  5-45   Specimen collection should occur prior to Sulfasalazine administration due to the potential for falsely depressed results  ALBUMIN 3 6 g/dL  3 2-5 0   TOTAL PROTEIN 5 7 g/dL L 6 4-8 2   eGFR 58 ml/min/1 73sq m     National Kidney Disease Education Program recommendations are as follows:  GFR calculation is accurate only with a steady state creatinine  Chronic Kidney disease less than 60 ml/min/1 73 sq  meters  Kidney failure less than 15 ml/min/1 73 sq  meters  (1) RETICULOCYTE COUNT 10UAG0772 08:40AM Kiersten Drain     Test Name Result Flag Reference   RETIC ABS # [id number]   RETIC % 8 85 % H 0 37-1 87     (1) URIC ACID 33Jgc0923 08:40AM Kiersten Drain     Test Name Result Flag Reference   URIC ACID 4 9 mg/dL  4 2-8 0   Specimen collection should occur prior to Metamizole administration due to the potential for falsely depressed results       (1) BILIRUBIN, DIRECT 89HYW1747 08:40AM [address]     Test Name Result Flag Reference   BILI, DIRECT 0 23 mg/dL H 0 00-0 20     (1) LD (LDH) 66MFT5575 08:40AM [facility] Drain     Test Name Result Flag Reference   LD (LDH) 299 U/L H      (1) IGG 13ZXZ2351 08:40AM [facility]     Test Name Result Flag Reference   GAMMAGLOBULIN;  0 mg/dL L 700 0-1600 0     (1) CBC/PLT/DIFF 62ESY9664 10:57AM Roberto, Tiff Daniels     Test Name Result Flag Reference   WBC COUNT 1 60 Thousand/uL LL 4 31-10 16   WBC ADJUSTED 1 60 Thousand/uL LL 4 31-10 16   RBC COUNT 4 08 Million/uL  3 88-5 62   HEMOGLOBIN 11 9 g/dL L 12 0-17 0   HEMATOCRIT 36 8 %  36 5-49 3   MCV 90 fL  82-98   MCH 29 1 pg  26 8-34 3   MCHC 32 2 g/dL  31 4-37 4   RDW 13 7 %  11 6-15 1   MPV 7 8 fL L 8 9-12 7   PLATELET COUNT 552 Thousands/uL L 149-390       16 Oct 2017 8:40 AM    (1) B-2 MICROGLOBULIN        B-2 MICROGLOBUL 3 1    Health Management  Esophagitis, reflux   EGD; every 3 months; Last 85VAW2088; Next Due: 21PYS5327; Active    Future Appointments    Date/Time Provider Specialty Site   01/18/2018 01:00 PM TYSHAWN Wilks   Endocrinology [facility] ENDOCRINOLOGY   11/15/2017 08:00 AM Tiff Mejia MD Hematology [facility]     Signatures   Electronically signed by : Nando Wei, [facility]; Oct 24 2017  2:46PM EST                       (Author)    Electronically signed by : Derk Dakin, MD; Oct 26 2017  8:01AM EST                       (Author)    Electronically signed by : Derk Dakin, MD; Oct 26 2017  8:01AM EST                       (Author)

## 2017-10-28 ENCOUNTER — APPOINTMENT (EMERGENCY)
Dept: CT IMAGING | Facility: HOSPITAL | Age: 62
DRG: 809 | End: 2017-10-28
Payer: MEDICARE

## 2017-10-28 ENCOUNTER — HOSPITAL ENCOUNTER (INPATIENT)
Facility: HOSPITAL | Age: 62
LOS: 6 days | Discharge: HOME | DRG: 809 | End: 2017-11-03
Attending: INTERNAL MEDICINE | Admitting: INTERNAL MEDICINE
Payer: MEDICARE

## 2017-10-28 DIAGNOSIS — D69.6 THROMBOCYTOPENIA (HCC): ICD-10-CM

## 2017-10-28 DIAGNOSIS — R51.9 HEADACHE AROUND THE EYES: ICD-10-CM

## 2017-10-28 DIAGNOSIS — R51.9 INTRACTABLE HEADACHE, UNSPECIFIED CHRONICITY PATTERN, UNSPECIFIED HEADACHE TYPE: ICD-10-CM

## 2017-10-28 DIAGNOSIS — D70.1 CHEMOTHERAPY-INDUCED NEUTROPENIA (HCC): Primary | ICD-10-CM

## 2017-10-28 DIAGNOSIS — T45.1X5A CHEMOTHERAPY-INDUCED NEUTROPENIA (HCC): Primary | ICD-10-CM

## 2017-10-28 DIAGNOSIS — D61.818 PANCYTOPENIA (HCC): ICD-10-CM

## 2017-10-28 DIAGNOSIS — R50.9 FUO (FEVER OF UNKNOWN ORIGIN): ICD-10-CM

## 2017-10-28 DIAGNOSIS — C91.10 CLL (CHRONIC LYMPHOCYTIC LEUKEMIA) (HCC): ICD-10-CM

## 2017-10-28 LAB
ALBUMIN SERPL BCP-MCNC: 3.8 G/DL (ref 3.5–5)
ALP SERPL-CCNC: 35 U/L (ref 46–116)
ALT SERPL W P-5'-P-CCNC: 20 U/L (ref 12–78)
ANION GAP SERPL CALCULATED.3IONS-SCNC: 7 MMOL/L (ref 4–13)
APTT PPP: 30 SECONDS (ref 23–35)
AST SERPL W P-5'-P-CCNC: 12 U/L (ref 5–45)
BASOPHILS # BLD AUTO: 0.01 THOUSANDS/ΜL (ref 0–0.1)
BASOPHILS NFR BLD AUTO: 1 % (ref 0–1)
BILIRUB SERPL-MCNC: 0.84 MG/DL (ref 0.2–1)
BILIRUB UR QL STRIP: NEGATIVE
BUN SERPL-MCNC: 23 MG/DL (ref 5–25)
CALCIUM SERPL-MCNC: 8.8 MG/DL (ref 8.3–10.1)
CHLORIDE SERPL-SCNC: 103 MMOL/L (ref 100–108)
CLARITY UR: CLEAR
CO2 SERPL-SCNC: 30 MMOL/L (ref 21–32)
COLOR UR: YELLOW
CREAT SERPL-MCNC: 1.03 MG/DL (ref 0.6–1.3)
EOSINOPHIL # BLD AUTO: 0.05 THOUSAND/ΜL (ref 0–0.61)
EOSINOPHIL NFR BLD AUTO: 6 % (ref 0–6)
ERYTHROCYTE [DISTWIDTH] IN BLOOD BY AUTOMATED COUNT: 16.6 % (ref 11.6–15.1)
ERYTHROCYTE [SEDIMENTATION RATE] IN BLOOD: 4 MM/HOUR (ref 0–10)
GFR SERPL CREATININE-BSD FRML MDRD: 77 ML/MIN/1.73SQ M
GLUCOSE SERPL-MCNC: 137 MG/DL (ref 65–140)
GLUCOSE SERPL-MCNC: 147 MG/DL (ref 65–140)
GLUCOSE SERPL-MCNC: 193 MG/DL (ref 65–140)
GLUCOSE SERPL-MCNC: 216 MG/DL (ref 65–140)
GLUCOSE UR STRIP-MCNC: NEGATIVE MG/DL
HCT VFR BLD AUTO: 33.5 % (ref 36.5–49.3)
HGB BLD-MCNC: 10.5 G/DL (ref 12–17)
HGB UR QL STRIP.AUTO: NEGATIVE
INR PPP: 1.11 (ref 0.86–1.16)
KETONES UR STRIP-MCNC: NEGATIVE MG/DL
LEUKOCYTE ESTERASE UR QL STRIP: NEGATIVE
LYMPHOCYTES # BLD AUTO: 0.17 THOUSANDS/ΜL (ref 0.6–4.47)
LYMPHOCYTES NFR BLD AUTO: 21 % (ref 14–44)
MCH RBC QN AUTO: 29.2 PG (ref 26.8–34.3)
MCHC RBC AUTO-ENTMCNC: 31.3 G/DL (ref 31.4–37.4)
MCV RBC AUTO: 93 FL (ref 82–98)
MONOCYTES # BLD AUTO: 0.22 THOUSAND/ΜL (ref 0.17–1.22)
MONOCYTES NFR BLD AUTO: 27 % (ref 4–12)
NEUTROPHILS # BLD AUTO: 0.38 THOUSANDS/ΜL (ref 1.85–7.62)
NEUTS SEG NFR BLD AUTO: 45 % (ref 43–75)
NITRITE UR QL STRIP: NEGATIVE
NRBC BLD AUTO-RTO: 0 /100 WBCS
PH UR STRIP.AUTO: 5.5 [PH] (ref 4.5–8)
PLATELET # BLD AUTO: 46 THOUSANDS/UL (ref 149–390)
PMV BLD AUTO: 11.6 FL (ref 8.9–12.7)
POTASSIUM SERPL-SCNC: 4 MMOL/L (ref 3.5–5.3)
PROT SERPL-MCNC: 5.9 G/DL (ref 6.4–8.2)
PROT UR STRIP-MCNC: NEGATIVE MG/DL
PROTHROMBIN TIME: 14.3 SECONDS (ref 12.1–14.4)
RBC # BLD AUTO: 3.59 MILLION/UL (ref 3.88–5.62)
SODIUM SERPL-SCNC: 140 MMOL/L (ref 136–145)
SP GR UR STRIP.AUTO: 1.02 (ref 1–1.03)
UROBILINOGEN UR QL STRIP.AUTO: 0.2 E.U./DL
WBC # BLD AUTO: 0.83 THOUSAND/UL (ref 4.31–10.16)

## 2017-10-28 PROCEDURE — 36415 COLL VENOUS BLD VENIPUNCTURE: CPT | Performed by: PHYSICIAN ASSISTANT

## 2017-10-28 PROCEDURE — 81002 URINALYSIS NONAUTO W/O SCOPE: CPT | Performed by: PHYSICIAN ASSISTANT

## 2017-10-28 PROCEDURE — 85610 PROTHROMBIN TIME: CPT | Performed by: PHYSICIAN ASSISTANT

## 2017-10-28 PROCEDURE — 96376 TX/PRO/DX INJ SAME DRUG ADON: CPT

## 2017-10-28 PROCEDURE — 82948 REAGENT STRIP/BLOOD GLUCOSE: CPT

## 2017-10-28 PROCEDURE — 87040 BLOOD CULTURE FOR BACTERIA: CPT | Performed by: PHYSICIAN ASSISTANT

## 2017-10-28 PROCEDURE — 96361 HYDRATE IV INFUSION ADD-ON: CPT

## 2017-10-28 PROCEDURE — 96374 THER/PROPH/DIAG INJ IV PUSH: CPT

## 2017-10-28 PROCEDURE — 85025 COMPLETE CBC W/AUTO DIFF WBC: CPT | Performed by: PHYSICIAN ASSISTANT

## 2017-10-28 PROCEDURE — 99285 EMERGENCY DEPT VISIT HI MDM: CPT

## 2017-10-28 PROCEDURE — 87798 DETECT AGENT NOS DNA AMP: CPT | Performed by: PHYSICIAN ASSISTANT

## 2017-10-28 PROCEDURE — 81003 URINALYSIS AUTO W/O SCOPE: CPT

## 2017-10-28 PROCEDURE — 80053 COMPREHEN METABOLIC PANEL: CPT | Performed by: PHYSICIAN ASSISTANT

## 2017-10-28 PROCEDURE — 96372 THER/PROPH/DIAG INJ SC/IM: CPT

## 2017-10-28 PROCEDURE — 85652 RBC SED RATE AUTOMATED: CPT | Performed by: PHYSICIAN ASSISTANT

## 2017-10-28 PROCEDURE — 70450 CT HEAD/BRAIN W/O DYE: CPT

## 2017-10-28 PROCEDURE — 85730 THROMBOPLASTIN TIME PARTIAL: CPT | Performed by: PHYSICIAN ASSISTANT

## 2017-10-28 RX ORDER — ONDANSETRON 2 MG/ML
4 INJECTION INTRAMUSCULAR; INTRAVENOUS EVERY 6 HOURS PRN
Status: DISCONTINUED | OUTPATIENT
Start: 2017-10-28 | End: 2017-11-03 | Stop reason: HOSPADM

## 2017-10-28 RX ORDER — PANTOPRAZOLE SODIUM 40 MG/1
40 TABLET, DELAYED RELEASE ORAL
Status: DISCONTINUED | OUTPATIENT
Start: 2017-10-28 | End: 2017-11-03 | Stop reason: HOSPADM

## 2017-10-28 RX ORDER — LORAZEPAM 0.5 MG/1
0.5 TABLET ORAL EVERY 8 HOURS PRN
Status: DISCONTINUED | OUTPATIENT
Start: 2017-10-28 | End: 2017-11-03 | Stop reason: HOSPADM

## 2017-10-28 RX ORDER — INSULIN GLARGINE 100 [IU]/ML
10 INJECTION, SOLUTION SUBCUTANEOUS EVERY MORNING
Status: DISCONTINUED | OUTPATIENT
Start: 2017-10-29 | End: 2017-11-03 | Stop reason: HOSPADM

## 2017-10-28 RX ORDER — BENZONATATE 100 MG/1
200 CAPSULE ORAL 3 TIMES DAILY PRN
Status: DISCONTINUED | OUTPATIENT
Start: 2017-10-28 | End: 2017-11-03 | Stop reason: HOSPADM

## 2017-10-28 RX ORDER — ASPIRIN 81 MG/1
81 TABLET, CHEWABLE ORAL EVERY OTHER DAY
Status: DISCONTINUED | OUTPATIENT
Start: 2017-10-29 | End: 2017-11-03 | Stop reason: HOSPADM

## 2017-10-28 RX ORDER — ACETAMINOPHEN 325 MG/1
650 TABLET ORAL EVERY 6 HOURS PRN
Status: DISCONTINUED | OUTPATIENT
Start: 2017-10-28 | End: 2017-11-03 | Stop reason: HOSPADM

## 2017-10-28 RX ORDER — BUTALBITAL, ACETAMINOPHEN AND CAFFEINE 50; 325; 40 MG/1; MG/1; MG/1
1 TABLET ORAL EVERY 4 HOURS PRN
Status: DISCONTINUED | OUTPATIENT
Start: 2017-10-28 | End: 2017-11-01

## 2017-10-28 RX ORDER — PREDNISONE 20 MG/1
20 TABLET ORAL DAILY
Status: DISCONTINUED | OUTPATIENT
Start: 2017-10-29 | End: 2017-11-03 | Stop reason: HOSPADM

## 2017-10-28 RX ORDER — DOXYCYCLINE HYCLATE 100 MG/1
100 CAPSULE ORAL DAILY
Status: DISCONTINUED | OUTPATIENT
Start: 2017-10-29 | End: 2017-11-03 | Stop reason: HOSPADM

## 2017-10-28 RX ORDER — FOLIC ACID 1 MG/1
1 TABLET ORAL DAILY
Status: DISCONTINUED | OUTPATIENT
Start: 2017-10-29 | End: 2017-11-03 | Stop reason: HOSPADM

## 2017-10-28 RX ORDER — FONDAPARINUX SODIUM 2.5 MG/.5ML
2.5 INJECTION SUBCUTANEOUS DAILY
Status: DISCONTINUED | OUTPATIENT
Start: 2017-10-29 | End: 2017-11-03 | Stop reason: HOSPADM

## 2017-10-28 RX ORDER — MORPHINE SULFATE 15 MG/1
15 TABLET ORAL EVERY 6 HOURS PRN
Qty: 12 TABLET | Refills: 0 | Status: SHIPPED | OUTPATIENT
Start: 2017-10-28 | End: 2017-10-31

## 2017-10-28 RX ORDER — CITALOPRAM 20 MG/1
40 TABLET ORAL DAILY
Status: DISCONTINUED | OUTPATIENT
Start: 2017-10-29 | End: 2017-11-03 | Stop reason: HOSPADM

## 2017-10-28 RX ORDER — SUCRALFATE ORAL 1 G/10ML
1000 SUSPENSION ORAL
Status: DISCONTINUED | OUTPATIENT
Start: 2017-10-28 | End: 2017-11-03 | Stop reason: HOSPADM

## 2017-10-28 RX ORDER — GABAPENTIN 100 MG/1
100 CAPSULE ORAL DAILY
Status: DISCONTINUED | OUTPATIENT
Start: 2017-10-29 | End: 2017-10-30

## 2017-10-28 RX ADMIN — ACETAMINOPHEN 650 MG: 325 TABLET ORAL at 20:27

## 2017-10-28 RX ADMIN — HYDROMORPHONE HYDROCHLORIDE 1 MG: 1 INJECTION, SOLUTION INTRAMUSCULAR; INTRAVENOUS; SUBCUTANEOUS at 16:18

## 2017-10-28 RX ADMIN — BUTALBITAL, ACETAMINOPHEN, AND CAFFEINE 1 TABLET: 50; 325; 40 TABLET ORAL at 22:57

## 2017-10-28 RX ADMIN — SUCRALFATE 1000 MG: 1 SUSPENSION ORAL at 20:19

## 2017-10-28 RX ADMIN — LIDOCAINE HYDROCHLORIDE 10 ML: 20 SOLUTION ORAL; TOPICAL at 20:10

## 2017-10-28 RX ADMIN — TBO-FILGRASTIM 480 MCG: 480 INJECTION, SOLUTION SUBCUTANEOUS at 16:02

## 2017-10-28 RX ADMIN — PANTOPRAZOLE SODIUM 40 MG: 40 TABLET, DELAYED RELEASE ORAL at 20:27

## 2017-10-28 RX ADMIN — CEFEPIME HYDROCHLORIDE 2000 MG: 2 INJECTION, POWDER, FOR SOLUTION INTRAVENOUS at 20:30

## 2017-10-28 RX ADMIN — SODIUM CHLORIDE 1000 ML: 0.9 INJECTION, SOLUTION INTRAVENOUS at 13:56

## 2017-10-28 RX ADMIN — HYDROMORPHONE HYDROCHLORIDE 1 MG: 1 INJECTION, SOLUTION INTRAMUSCULAR; INTRAVENOUS; SUBCUTANEOUS at 14:10

## 2017-10-28 RX ADMIN — INSULIN LISPRO 10 UNITS: 100 INJECTION, SOLUTION INTRAVENOUS; SUBCUTANEOUS at 20:08

## 2017-10-28 NOTE — H&P
History and Physical - [facility] Internal Medicine    Patient Information: Nimco Clark 60 y o  male MRN: [id number]  Unit/Bed#: ED 17 Encounter: [id number]  Admitting Physician: Camilla Latham PA-C  PCP: Wesly Da Silva MD  Date of Admission:  10/28/17    Assessment/Plan:    Hospital Problem List:     Active Problems:    FUO (fever of unknown origin)    Pancytopenia ([address] )      Plan for the Primary Problem(s):  · FUO  · Low grade fever of Tmax of 100 1 in OP setting in the setting of worsening neutropenia in a pt w/CLL and w/recent scalp cellulitis w/IP admission and treatment w/IP Vancomycin with transition to PO keflex/docycycline which pt just completed 4d prior  · No sepsis here, CT head demonstrates stability of prior posterior scalp/soft tissue edema, no evidence of cellulitis or abscess on PE today, UA negative  · 1 blood cx drawn per portacath, ED providered ordered flu cx although pt currently asymptomatic will f/u  · Pt d/w hem/onc will monitor and treat worsening leukocytopenia  · Will check 2nd blood cx, of RUE as pt has agreed to 2ndmonitor carefully off abx at this time   Spoke w/Dr Luke Grey by phone from ID who recommended a one time dose of cefepime 2g at this time and close monitoring by ID in addition to continued doxycycline as outlined by hem/onc    Plan for Additional Problems:   · Pancytopenia  · Appreciate hem/onc consult, likely 2* obinutuzumab  · Leukocytopenia- with wbc of 4 3 one week prior w/filgrastim today worsened to wbc of 0 83 w/ANC of 380  · granix 480mcg subq daily until ANC >750 per hem/onc    · Thrombocytopenia- 46 from 96 5 days prior, 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 avoid heparin  · Continue to monitor, transfuse PRN    Anemia   Hx of hemolytic anemia   hgb stable from 9 7-10 5 normocytic   Continue to monitor, transfuse PRN    CLL   Continue ibrutinib 280mg daily   Pt will have to bring home supply, this was d/w pt at bedside    DM   2* steroids   Continue OP regimen    Candidal esophagitis   Continue OP sucralfate, PPI, magic mouthwash           VTE Prophylaxis: Fondaparinux (Arixtra)  / sequential compression device   Code Status: Full Code  POLST: There is no POLST form on file for this patient (pre-hospital)    Anticipated Length of Stay:  Patient will be admitted on an Inpatient basis with an anticipated length of stay of  Greater than 2 midnights  Justification for Hospital Stay: FUO worsening pancytopenia    Total Time for Visit, including Counseling / Coordination of Care: 45 minutes  Greater than 50% of this total time spent on direct patient counseling and coordination of care  Chief Complaint:   HA, recent scalp cellulitis    History of Present Illness:    Deirdre Matson is a 60 y o  male who presents with PMH of pancytopenia, CLL with hemolytic anemia diabetes,  Heparin-induced thrombocytopenia coming in the ED for low-grade fever 100 1 and worsening headaches over the last 2 days  Patient was previously seen and treated from 10/7/2017 to 10/14/2017 for a fever 102 and scalp cellulitis  He was treated with IV vancomycin and seen by infectious disease  He had successful transition to p  o  Keflex and doxycycline which he just finished 4 days ago  Patient reports that he is doing well although he has had the onset of a posterior headache described as burning bilaterally at the occiput as well as a right-sided stabbing headache at the [place] above the eye  He reports this comes and goes and is improved somewhat with Tylenol although at the last couple days the pain has been increasing in severity  Reports it is associated with photosensitivity but no fullness sensitivity, nausea but no vomiting  He has had no change in vision  He has had no weakness or numbness or tingling from his baseline diabetic neuropathy  He has no weakness in 1 arm or leg or the other or facial droop      He otherwise reports no sore throat, runny nose, sinus congestion, nausea or vomiting or diarrhea or abdominal pain, no chest pain or shortness of breath, no burning with urination or increased frequency  He reports no other rashes or erythema  He reports his scalp erythema is resolved  Review of Systems:    Review of Systems   Constitutional: Negative for appetite change, chills and fatigue  HENT: Negative for congestion, dental problem, rhinorrhea and sore throat  Eyes: Negative for photophobia and visual disturbance  Respiratory: Negative for cough and shortness of breath  Cardiovascular: Negative for chest pain  Gastrointestinal: Negative for abdominal pain, diarrhea, nausea and vomiting  Genitourinary: Negative for dysuria  Skin: Negative for color change  Neurological: Negative for light-headedness  Past Medical and Surgical History:     Past Medical History:   Diagnosis Date    CAD (coronary artery disease) 2004    Cellulitis of scalp     CKD (chronic kidney disease) stage 3, GFR 30-59 ml/min     [facility]     COPD (chronic obstructive pulmonary disease) [facility]     [facility]     H/O blood clots     Hemolytic anemia (HCC)     History of TIA (transient ischemic attack)     Hyperlipidemia     Hypertension     Multiple gastric ulcers     Myocardial infarction     Recurrent sinusitis     Thrombocytopenia (HCC)     Vertigo        Past Surgical History:   Procedure Laterality Date    ARTHROSCOPIC REPAIR ACL      lt knee    PORTACATH PLACEMENT      CT ESOPHAGOGASTRODUODENOSCOPY TRANSORAL DIAGNOSTIC N/A 10/5/2017    Procedure: ESOPHAGOGASTRODUODENOSCOPY (EGD) with bx;  Surgeon: Jeanie Sidhu DO;  Location: [facility];   Service: Gastroenterology    CT ESOPHAGOSCOPY FLEXIBLE TRANSORAL WITH BIOPSY N/A 9/2/2016    Procedure: ESOPHAGOGASTRODUODENOSCOPY (EGD); ESOPHAGEAL DILATION; ESOPHAGEAL BIOPSY;  Surgeon: Ambar Martin MD;  Location: [facility];  Service: Thoracic    TONSILLECTOMY      UPPER GASTROINTESTINAL ENDOSCOPY      x 6       Meds/Allergies:    Prior to Admission medications    Medication Sig Start Date End Date Taking?  Authorizing Provider   al Perfecto Chock oxide-diphenhydramine-lidocaine viscous (MAGIC MOUTHWASH) Take 10 mL by mouth 4 (four) times a day (before meals and at bedtime) for 30 days 10/5/17 11/4/17 Yes Maritza Butler,    aspirin 81 MG tablet Take 81 mg by mouth every other day Every other day    Yes Historical Provider, MD   benzonatate (TESSALON) 200 MG capsule Take 200 mg by mouth 3 (three) times a day as needed for cough   Yes Historical Provider, MD   citalopram (CeleXA) 40 mg tablet Take 40 mg by mouth daily   Yes Historical Provider, MD   doxycycline (DORYX) 100 MG EC tablet Take 100 mg by mouth daily   Yes Historical Provider, MD   fenofibrate (TRIGLIDE) 50 MG tablet Take 134 mg by mouth daily     Yes Historical Provider, MD   folic acid (FOLVITE) 1 mg tablet Take 1 mg by mouth daily   Yes Historical Provider, MD   gabapentin (NEURONTIN) 100 mg capsule Take 100 mg by mouth daily     Yes Historical Provider, MD   glucose monitoring kit (FREESTYLE) monitoring kit 1 each by Does not apply route as needed ( ) E 11 9 9/20/17  Yes Leo Bueno MD   Ibrutinib 140 MG CAPS Take 280 mg by mouth daily   Yes Historical Provider, MD   insulin glargine (LANTUS) 100 units/mL subcutaneous injection Inject 15 Units under the skin every morning  Patient taking differently: Inject 10 Units under the skin every morning   9/20/17  Yes Leo Bueno MD   insulin lispro (HumaLOG) 100 units/mL injection Inject 10 Units under the skin 3 (three) times a day with meals 10/14/17  Yes Anh Jacobo MD   Lancets (FREESTYLE) lancets Use as instructed--test 2 times a day    E 11 9 9/20/17  Yes Leo Bueno MD   multivitamin (THERAGRAN) TABS Take 1 tablet by mouth daily   Yes Historical Provider, MD   obinutuzumab (GAZYVA) 1000 mg/40 mL SOLN Infuse into a venous catheter   Yes Historical Provider, MD   pantoprazole (PROTONIX) 40 mg tablet Take 40 mg by mouth 2 (two) times a day     Yes Historical Provider, MD   predniSONE 20 mg tablet Take 1 tablet by mouth daily 9/20/17  Yes Carter Bacon MD   sucralfate (CARAFATE) 1 g/10 mL suspension Take 10 mL by mouth 4 (four) times a day 10/5/17  Yes Stewart Temple DO   LORazepam (ATIVAN) 0 5 mg tablet Take 1 tablet by mouth every 8 (eight) hours as needed for anxiety for up to 10 days 3/10/17 8/1/17  Idalia Hercules MD   morphine (MSIR) 15 mg tablet Take 1 tablet by mouth every 6 (six) hours as needed for severe pain for up to 3 days Max Daily Amount: 60 mg 10/28/17 10/31/17  Miladys Buchanan PA-C     I have reviewed home medications with patient personally  Allergies: Allergies   Allergen Reactions    Heparin Other (See Comments)     clot    Macrolides And Ketolides Other (See Comments)     Interacts with other meds he is taking       Social History:     Marital Status: /Civil Union   Occupation:   Patient Pre-hospital Living Situation:   Patient Pre-hospital Level of Mobility:   Patient Pre-hospital Diet Restrictions:   Substance Use History:   History   Alcohol Use No     History   Smoking Status    Former Smoker   Smokeless Tobacco    Never Used     Comment: pt refuses to answer question     History   Drug Use No       Family History:    History reviewed  No pertinent family history  Physical Exam:     Vitals:   Blood Pressure: 166/74 (10/28/17 1619)  Pulse: 72 (10/28/17 1619)  Temperature: 98 °F (36 7 °C) (10/28/17 1222)  Temp Source: Temporal (10/28/17 1222)  Respirations: 18 (10/28/17 1619)  Weight - Scale: 97 kg (213 lb 13 5 oz) (10/28/17 1220)  SpO2: 98 % (10/28/17 1619)    Physical Exam   Constitutional: He appears well-developed  No distress  HENT:   Head: Normocephalic and atraumatic  Left Ear: External ear normal    Mouth/Throat: Oropharynx is clear and moist  No oropharyngeal exudate     No tenderness to palpation at this time, mild swelling of posterior scalp appreciated  No evidence scalp erythema, folliculitis, focal abscess clinically  Eyes: Conjunctivae and EOM are normal  Pupils are equal, round, and reactive to light  Right eye exhibits no discharge  Left eye exhibits no discharge  No scleral icterus  Cardiovascular: Normal rate, regular rhythm, normal heart sounds and intact distal pulses  Exam reveals no gallop and no friction rub  No murmur heard  Pulmonary/Chest: Effort normal  No respiratory distress  He has no wheezes  He has no rales  He exhibits no tenderness  Abdominal: Soft  He exhibits no distension  There is no tenderness  There is no rebound and no guarding  Musculoskeletal: He exhibits no edema  Lymphadenopathy:     He has no cervical adenopathy  Neurological: He is alert  Skin: Skin is dry  He is not diaphoretic  No erythema  Additional Data:     Lab Results: I have personally reviewed pertinent reports  Results from last 7 days  Lab Units 10/28/17  1357   WBC Thousand/uL 0 83*   HEMOGLOBIN g/dL 10 5*   HEMATOCRIT % 33 5*   PLATELETS Thousands/uL 46*   NEUTROS PCT % 45   LYMPHS PCT % 21   MONOS PCT % 27*   EOS PCT % 6       Results from last 7 days  Lab Units 10/28/17  1357   SODIUM mmol/L 140   POTASSIUM mmol/L 4 0   CHLORIDE mmol/L 103   CO2 mmol/L 30   BUN mg/dL 23   CREATININE mg/dL 1 03   CALCIUM mg/dL 8 8   TOTAL PROTEIN g/dL 5 9*   BILIRUBIN TOTAL mg/dL 0 84   ALK PHOS U/L 35*   ALT U/L 20   AST U/L 12   GLUCOSE RANDOM mg/dL 147*       Results from last 7 days  Lab Units 10/28/17  1357   INR  1 11       Imaging: I have personally reviewed pertinent reports  Xr Chest 2 Views    Result Date: 10/8/2017  Narrative: CHEST - DUAL ENERGY INDICATION: 39-year-old male, fever COMPARISON: 9/18/2017 chest x-ray VIEWS:  PA (including soft tissue/bone algorithms) and lateral projections; 4 images FINDINGS: The lungs are clear  No pleural effusions   The cardiomediastinal silhouette is unremarkable  Bony thorax is unremarkable  Right-sided portacatheter, typical appearance, terminating at cavoatrial junction  No pneumothorax Findings are stable     Impression: No acute cardiopulmonary disease, stable                               Findings are consistent with emergency room physician's preliminary reading  Workstation performed: [id number]     Ct Head Without Contrast    Result Date: 10/28/2017  Narrative: CT BRAIN - WITHOUT CONTRAST INDICATION:  Headache COMPARISON:  CT head 10/7/2017 TECHNIQUE:  CT examination of the brain was performed  In addition to axial images, coronal reformatted images were created and submitted for interpretation  Radiation dose length product (DLP) for this visit:  1068 mGy-cm   This examination, like all CT scans performed in the [facility], was performed utilizing techniques to minimize radiation dose exposure, including the use of iterative reconstruction and automated exposure control  IMAGE QUALITY:  Diagnostic  FINDINGS:  PARENCHYMA:  Decreased attenuation is noted in the supratentorial white matter demonstrating an appearance most consistent with mild microangiopathic change  No intracranial mass, mass effect or midline shift  No CT signs of acute infarction  There is no parenchymal hemorrhage  VENTRICLES AND EXTRA-AXIAL SPACES:  Normal for patient's age  VISUALIZED ORBITS AND PARANASAL SINUSES:  Orbits appear normal   Mild scattered sinus mucosal thickening is noted  No fluid levels are seen  CALVARIUM AND EXTRACRANIAL SOFT TISSUES:  No calvarial fracture  Stable subcutaneous soft tissue swelling along the posterior occiput near the vertex  Impression: No acute intracranial abnormality  Stable posterior soft tissue swelling unchanged from prior exam  Workstation performed: [id number]     Ct Head Without Contrast    Result Date: 10/8/2017  Narrative: CT BRAIN - WITHOUT CONTRAST INDICATION:  CLL   Pain at [employer] of head with tender mass COMPARISON:  September 18, 2017 TECHNIQUE:  CT examination of the brain was performed  In addition to axial images, coronal reformatted images were created and submitted for interpretation  Radiation dose length product (DLP) for this visit:  1026 mGy-cm   This examination, like all CT scans performed in the [facility], was performed utilizing techniques to minimize radiation dose exposure, including the use of iterative reconstruction and automated exposure control  IMAGE QUALITY:  Diagnostic  FINDINGS:  PARENCHYMA: Age-related central atrophy  No intracranial mass, mass effect or midline shift  No CT signs of acute infarction  There is no parenchymal hemorrhage  VENTRICLES AND EXTRA-AXIAL SPACES:  Normal for patient's age  VISUALIZED ORBITS AND PARANASAL SINUSES:  Orbits appear normal  Moderate scattered sinus mucosal thickening is noted  Small amounts of fluid in the sphenoid sinuses  CALVARIUM AND EXTRACRANIAL SOFT TISSUES:  Soft tissue swelling posterior scalp at the vertex  Skin thickening  No focal mass or collection  This appears worse when compared to prior exam      Impression: No acute intracranial abnormality  Worsening Soft tissue swelling posterior scalp at the vertex  No focal mass or collection  Please correlate clinically for infection versus other etiologies including unlikely cutaneous CLL  Findings are consistent with the preliminary report from [facility] which was provided shortly after completion of the exam              Workstation performed: [id number]     Radiology Results    Result Date: 10/9/2017  Narrative: Ordered by an unspecified provider  EKG, Pathology, and Other Studies Reviewed on Admission:   · EKG: none    Allscripts / Epic Records Reviewed: Yes     ** Please Note: This note has been constructed using a voice recognition system   **

## 2017-10-28 NOTE — DISCHARGE INSTRUCTIONS
Neutropenia   WHAT YOU NEED TO KNOW:   Neutropenia is a condition that causes you to have a low number of neutrophils in your blood  Neutrophils are a type of white blood cell produced in the bone marrow  They help your body fight infection and bacteria  Neutropenia can develop if there is damage to your bone marrow or if your body uses or destroys neutrophils faster than they can be produced  DISCHARGE INSTRUCTIONS:   Medicines: The following medicines may be ordered for you:  · Antibiotics  help treat or prevent an infection caused by bacteria  · Antifungal medicine  helps kill fungus that can cause illness  · Take your medicine as directed  Contact your healthcare provider if you think your medicine is not helping or if you have side effects  Tell him or her if you are allergic to any medicine  Keep a list of the medicines, vitamins, and herbs you take  Include the amounts, and when and why you take them  Bring the list or the pill bottles to follow-up visits  Carry your medicine list with you in case of an emergency  Follow up with your healthcare provider as directed:  Write down your questions so you remember to ask them during your visits  Self-care:  Ask your healthcare provider about these and other precautions you may need to prevent an infection:  · Wash your hands before you prepare or eat food, and after you use the bathroom  · Bathe daily  If you shave, use an electric razor to prevent nicks in your skin  · Use a soft-bristled toothbrush, and brush your teeth gently 2 times each day  Floss your teeth daily  · Avoid crowds and anyone who may be sick  · Avoid contact with animal saliva, urine, or waste  Have someone clean your cat's litter box, fish tank, or  after your dog  · Wash raw fruits and vegetables thoroughly  Cook meats and eggs thoroughly  · Use stool softeners to help with constipation  Do not use suppositories or enemas   Constipation, suppositories, and enemas can cause a tear in your rectum  This allows germs to get in and can increase your risk for infection  · Ask your healthcare provider if you should get the flu vaccine every fall  Contact your healthcare provider if:   · You have fever or chills  · You have a new cough  · You have a sore throat or a new mouth sore  · You have redness or swelling any place on your body  · You have pain in your abdomen or rectum  · You have burning or pain when you urinate  · You have diarrhea  · You are more tired or forgetful than usual      · You have questions or concerns about your condition or care  Return to the emergency department if:   · You have a fever of 100 4°F (38°C) for more than 1 hour  · You have a fever of 101°F (38 3°C) or higher once  © 2017 [address] [employer] Information is for End User's use only and may not be sold, redistributed or otherwise used for commercial purposes  All illustrations and images included in CareNotes® are the copyrighted property of [employer] M [employer]  or [address]  The above information is an  only  It is not intended as medical advice for individual conditions or treatments  Talk to your doctor, nurse or pharmacist before following any medical regimen to see if it is safe and effective for you  Acute Headache   WHAT YOU NEED TO KNOW:   An acute headache is pain or discomfort that starts suddenly and gets worse quickly  You may have an acute headache only when you feel stress or eat certain foods  Other acute headache pain can happen every day, and sometimes several times a day  DISCHARGE INSTRUCTIONS:   Return to the emergency department if:   · You have severe pain  · You have numbness or weakness on one side of your face or body  · You have a headache that occurs after a blow to the head, a fall, or other trauma       · You have a headache, are forgetful or confused, or have trouble speaking  · You have a headache, stiff neck, and a fever  Contact your healthcare provider if:   · You have a constant headache and are vomiting  · You have a headache each day that does not get better, even after treatment  · You have changes in your headaches, or new symptoms that occur when you have a headache  · You have questions or concerns about your condition or care  Medicines: You may need any of the following:  · Prescription pain medicine  may be given  The medicine your healthcare provider recommends will depend on the kind of headaches you have  You will need to take prescription headache medicines as directed to prevent a problem called rebound headache  These headaches happen with regular use of pain relievers for headache disorders  · NSAIDs , such as ibuprofen, help decrease swelling, pain, and fever  This medicine is available with or without a doctor's order  NSAIDs can cause stomach bleeding or kidney problems in certain people  If you take blood thinner medicine, always ask your healthcare provider if NSAIDs are safe for you  Always read the medicine label and follow directions  · Acetaminophen  decreases pain and fever  It is available without a doctor's order  Ask how much to take and how often to take it  Follow directions  Read the labels of all other medicines you are using to see if they also contain acetaminophen, or ask your doctor or pharmacist  Acetaminophen can cause liver damage if not taken correctly  Do not use more than 3 grams (3,000 milligrams) total of acetaminophen in one day  · Antidepressants  may be given for some kinds of headaches  · Take your medicine as directed  Contact your healthcare provider if you think your medicine is not helping or if you have side effects  Tell him or her if you are allergic to any medicine  Keep a list of the medicines, vitamins, and herbs you take  Include the amounts, and when and why you take them  Bring the list or the pill bottles to follow-up visits  Carry your medicine list with you in case of an emergency  Manage your symptoms:   · Apply heat or ice  on the headache area  Use a heat or ice pack  For an ice pack, you can also put crushed ice in a plastic bag  Cover the pack or bag with a towel before you apply it to your skin  Ice and heat both help decrease pain, and heat also helps decrease muscle spasms  Apply heat for 20 to 30 minutes every 2 hours  Apply ice for 15 to 20 minutes every hour  Apply heat or ice for as long and for as many days as directed  You may alternate heat and ice  · Relax your muscles  Lie down in a comfortable position and close your eyes  Relax your muscles slowly  Start at your toes and work your way up your body  · Keep a record of your headaches  Write down when your headaches start and stop  Include your symptoms and what you were doing when the headache began  Record what you ate or drank for 24 hours before the headache started  Describe the pain and where it hurts  Keep track of what you did to treat your headache and if it worked  Prevent an acute headache:   · Avoid anything that triggers an acute headache  Examples include exposure to chemicals, going to high altitude, or not getting enough sleep  Create a regular sleep routine  Go to sleep at the same time and wake up at the same time each day  Do not use electronic devices before bedtime  These may trigger a headache or prevent you from sleeping well  · Do not smoke  Nicotine and other chemicals in cigarettes and cigars can trigger an acute headache or make it worse  Ask your healthcare provider for information if you currently smoke and need help to quit  E-cigarettes or smokeless tobacco still contain nicotine  Talk to your healthcare provider before you use these products  · Limit alcohol as directed  Alcohol can trigger an acute headache or make it worse   If you have cluster headaches, do not drink alcohol during an episode  For other types of headaches, ask your healthcare provider if it is safe for you to drink alcohol  Ask how much is safe for you to drink, and how often  · Exercise as directed  Exercise can reduce tension and help with headache pain  Aim for 30 minutes of physical activity on most days of the week  Your healthcare provider can help you create an exercise plan  · Eat a variety of healthy foods  Healthy foods include fruits, vegetables, low-fat dairy products, lean meats, fish, whole grains, and cooked beans  Your healthcare provider or dietitian can help you create meals plans if you need to avoid foods that trigger headaches  Follow up with your healthcare provider as directed:  Bring your headache record with you when you see your healthcare provider  Write down your questions so you remember to ask them during your visits  © 2017 [address] [employer]  Information is for End User's use only and may not be sold, redistributed or otherwise used for commercial purposes  All illustrations and images included in CareNotes® are the copyrighted property of [employer]  or [employer]  The above information is an  only  It is not intended as medical advice for individual conditions or treatments  Talk to your doctor, nurse or pharmacist before following any medical regimen to see if it is safe and effective for you

## 2017-10-28 NOTE — CONSULTS
Oncology Consult Note  Anamaria Santos 60 y o  male MRN: [id number]  Unit/Bed#: ED 17 Encounter: [id number]      Presenting Complaint:  Fever, low white blood count, history of CLL    History of Presenting Illness:  27-year-old  male who was diagnosed in 2001 with seen in, he was treated with FCR, he developed hemolysis on Fludara through the treatment was changed to PCR, he also had a history of HIT, he did well until 2012 with relapse of disease he was treated with Cytoxan, prednisone, Oncovin out rituximab, he developed autoimmune hemolytic anemia, did not respond to rituximab and prednisone, IVIG has no response as well  In January 2017 he had esophagitis treated with acyclovir  In March 2017 he had anemia of 6 2, WBC 617074, positive direct Suhail test, he was treated with Solu-Medrol, IVIG, bone marrow biopsy showed CLL, he was treated with single dose of chlorambucil  The therapy was changed to venetoclax imaging studies showed extensive lymphadenopathy,  Without any response  The therapy was changed to Ibrutinib 420 mg daily initially and then 180 mg p  o  daily as well as Obinutuzumab every month in September 2017 with filgrastim support  In September he was admitted with uncontrolled hyperglycemia and diabetes mellitus type 2 secondary to prednisone Ibrutinib was reduced again to 280 mg in October 2017 he was admitted due to the cellulitis of the scalp area, treated with doxycycline  He had been taking Ibrutinib faithfully and he received Obinutuzumab last Tuesday, the patient reported severe scalp pain on the left side, low-grade fever of 100 4, denies any chills, blurred vision, diplopia, nausea, vomiting, subjective lymphadenopathy, he was found to have WBC of 0 83, hemoglobin 10 5, platelets 19236, 66% neutrophils, 21% lymphocytes, 27% monocytes, ANC of 380  Five days ago his WBC of 4 3, hemoglobin of 10 1, platelets of 94366  This is most likely secondary to Obinutuzumab bone marrow suppression    Review of Systems - As stated in the HPI otherwise the fourteen point review of systems was negative  Past Medical History:   Diagnosis Date    CAD (coronary artery disease) 2004    Cellulitis of scalp     CKD (chronic kidney disease) stage 3, GFR 30-59 ml/min     [facility]ocytic [facility] [address] [facility]     COPD (chronic obstructive pulmonary disease) ([facility] )     Diabetes mellitus ([facility] )     H/O blood clots     Hemolytic anemia (HCC)     History of TIA (transient ischemic attack)     Hyperlipidemia     Hypertension     Multiple gastric ulcers     Myocardial infarction     Recurrent sinusitis     Thrombocytopenia (HCC)     Vertigo        Social History     Social History    Marital status: /Civil Union     Spouse name: N/A    Number of children: N/A    Years of education: N/A     Social History Main Topics    Smoking status: Former Smoker    Smokeless tobacco: Never Used      Comment: pt refuses to answer question    Alcohol use No    Drug use: No    Sexual activity: Not Asked     Other Topics Concern    None     Social History Narrative    None       History reviewed  No pertinent family history  Allergies   Allergen Reactions    Heparin Other (See Comments)     clot    Macrolides And Ketolides Other (See Comments)     Interacts with other meds he is taking       No current facility-administered medications for this encounter       Current Outpatient Prescriptions:     al mag oxide-diphenhydramine-lidocaine viscous (MAGIC MOUTHWASH), Take 10 mL by mouth 4 (four) times a day (before meals and at bedtime) for 30 days, Disp: 200 mL, Rfl: 5    aspirin 81 MG tablet, Take 81 mg by mouth every other day Every other day , Disp: , Rfl:     benzonatate (TESSALON) 200 MG capsule, Take 200 mg by mouth 3 (three) times a day as needed for cough, Disp: , Rfl:     citalopram (CeleXA) 40 mg tablet, Take 40 mg by mouth daily, Disp: , Rfl:     doxycycline (DORYX) 100 MG EC tablet, Take 100 mg by mouth daily, Disp: , Rfl:     fenofibrate (TRIGLIDE) 50 MG tablet, Take 134 mg by mouth daily  , Disp: , Rfl:     folic acid (FOLVITE) 1 mg tablet, Take 1 mg by mouth daily, Disp: , Rfl:     gabapentin (NEURONTIN) 100 mg capsule, Take 100 mg by mouth daily  , Disp: , Rfl:     glucose monitoring kit (FREESTYLE) monitoring kit, 1 each by Does not apply route as needed ( ) E 11 9, Disp: 1 each, Rfl: 0    Ibrutinib 140 MG CAPS, Take 280 mg by mouth daily, Disp: , Rfl:     insulin glargine (LANTUS) 100 units/mL subcutaneous injection, Inject 15 Units under the skin every morning (Patient taking differently: Inject 10 Units under the skin every morning  ), Disp: 10 mL, Rfl: 0    insulin lispro (HumaLOG) 100 units/mL injection, Inject 10 Units under the skin 3 (three) times a day with meals, Disp: , Rfl: 0    Lancets (FREESTYLE) lancets, Use as instructed--test 2 times a day  E 11 9, Disp: 100 each, Rfl: 0    multivitamin (THERAGRAN) TABS, Take 1 tablet by mouth daily, Disp: , Rfl:     obinutuzumab (GAZYVA) 1000 mg/40 mL SOLN, Infuse into a venous catheter, Disp: , Rfl:     pantoprazole (PROTONIX) 40 mg tablet, Take 40 mg by mouth 2 (two) times a day  , Disp: , Rfl:     predniSONE 20 mg tablet, Take 1 tablet by mouth daily, Disp: , Rfl: 0    sucralfate (CARAFATE) 1 g/10 mL suspension, Take 10 mL by mouth 4 (four) times a day, Disp: 420 mL, Rfl: 0    LORazepam (ATIVAN) 0 5 mg tablet, Take 1 tablet by mouth every 8 (eight) hours as needed for anxiety for up to 10 days, Disp: 30 tablet, Rfl: 0    morphine (MSIR) 15 mg tablet, Take 1 tablet by mouth every 6 (six) hours as needed for severe pain for up to 3 days Max Daily Amount: 60 mg, Disp: 12 tablet, Rfl: 0      /74   Pulse 72   Temp 98 °F (36 7 °C) (Temporal)   Resp 18   Wt 97 kg (213 lb 13 5 oz)   SpO2 98%   BMI 28 22 kg/m²     General Appearance:    Alert, oriented , tender left-sided scalp, I could not however other appreciate any fluctuation       Eyes:    PERRL   Ears:    Normal external ear canals, both ears   Nose:   Nares normal, septum midline   Throat:   Mucosa moist  Pharynx without injection  Neck:   Supple       Lungs:     Clear to auscultation bilaterally   Chest Wall:    No tenderness or deformity    Heart:    Regular rate and rhythm       Abdomen:     Soft, non-tender, bowel sounds +, no organomegaly           Extremities:   Extremities no cyanosis or edema       Skin:   no rash or icterus      Lymph nodes:   Cervical, supraclavicular, and axillary nodes normal   Neurologic:   CNII-XII intact, normal strength, sensation and reflexes     Throughout               Recent Results (from the past 48 hour(s))   CBC and differential    Collection Time: 10/28/17  1:57 PM   Result Value Ref Range    WBC 0 83 (LL) 4 31 - 10 16 Thousand/uL    RBC 3 59 (L) 3 88 - 5 62 Million/uL    Hemoglobin 10 5 (L) 12 0 - 17 0 g/dL    Hematocrit 33 5 (L) 36 5 - 49 3 %    MCV 93 82 - 98 fL    MCH 29 2 26 8 - 34 3 pg    MCHC 31 3 (L) 31 4 - 37 4 g/dL    RDW 16 6 (H) 11 6 - 15 1 %    MPV 11 6 8 9 - 12 7 fL    Platelets 46 (LL) 143 - 390 Thousands/uL    nRBC 0 /100 WBCs    Neutrophils Relative 45 43 - 75 %    Lymphocytes Relative 21 14 - 44 %    Monocytes Relative 27 (H) 4 - 12 %    Eosinophils Relative 6 0 - 6 %    Basophils Relative 1 0 - 1 %    Neutrophils Absolute 0 38 (L) 1 85 - 7 62 Thousands/µL    Lymphocytes Absolute 0 17 (L) 0 60 - 4 47 Thousands/µL    Monocytes Absolute 0 22 0 17 - 1 22 Thousand/µL    Eosinophils Absolute 0 05 0 00 - 0 61 Thousand/µL    Basophils Absolute 0 01 0 00 - 0 10 Thousands/µL   Comprehensive metabolic panel    Collection Time: 10/28/17  1:57 PM   Result Value Ref Range    Sodium 140 136 - 145 mmol/L    Potassium 4 0 3 5 - 5 3 mmol/L    Chloride 103 100 - 108 mmol/L    CO2 30 21 - 32 mmol/L    Anion Gap 7 4 - 13 mmol/L    BUN 23 5 - 25 mg/dL    Creatinine 1 03 0 60 - 1 30 mg/dL    Glucose 147 (H) 65 - 140 mg/dL    Calcium 8 8 8 3 - 10 1 mg/dL    AST 12 5 - 45 U/L    ALT 20 12 - 78 U/L    Alkaline Phosphatase 35 (L) 46 - 116 U/L    Total Protein 5 9 (L) 6 4 - 8 2 g/dL    Albumin 3 8 3 5 - 5 0 g/dL    Total Bilirubin 0 84 0 20 - 1 00 mg/dL    eGFR 77 ml/min/1 73sq m   Protime-INR    Collection Time: 10/28/17  1:57 PM   Result Value Ref Range    Protime 14 3 12 1 - 14 4 seconds    INR 1 11 0 86 - 1 16   APTT    Collection Time: 10/28/17  1:57 PM   Result Value Ref Range    PTT 30 23 - 35 seconds   Sedimentation rate, automated    Collection Time: 10/28/17  1:57 PM   Result Value Ref Range    Sed Rate 4 0 - 10 mm/hour   ED Urine Macroscopic    Collection Time: 10/28/17  3:16 PM   Result Value Ref Range    Color, UA Yellow     Clarity, UA Clear     pH, UA 5 5 4 5 - 8 0    Leukocytes, UA Negative Negative    Nitrite, UA Negative Negative    Protein, UA Negative Negative mg/dl    Glucose, UA Negative Negative mg/dl    Ketones, UA Negative Negative mg/dl    Urobilinogen, UA 0 2 0 2, 1 0 E U /dl E U /dl    Bilirubin, UA Negative Negative    Blood, UA Negative Negative    Specific Gravity, UA 1 020 1 003 - 1 030         Xr Chest 2 Views    Result Date: 10/8/2017  Narrative: CHEST - DUAL ENERGY INDICATION: 56-year-old male, fever COMPARISON: 9/18/2017 chest x-ray VIEWS:  PA (including soft tissue/bone algorithms) and lateral projections; 4 images FINDINGS: The lungs are clear  No pleural effusions  The cardiomediastinal silhouette is unremarkable  Bony thorax is unremarkable  Right-sided portacatheter, typical appearance, terminating at cavoatrial junction  No pneumothorax Findings are stable     Impression: No acute cardiopulmonary disease, stable                               Findings are consistent with emergency room physician's preliminary reading   Workstation performed: [id number]     Ct Head Without Contrast    Result Date: 10/28/2017  Narrative: CT BRAIN - WITHOUT CONTRAST INDICATION:  Headache COMPARISON:  CT head 10/7/2017 TECHNIQUE:  CT examination of the brain was performed  In addition to axial images, coronal reformatted images were created and submitted for interpretation  Radiation dose length product (DLP) for this visit:  1068 mGy-cm   This examination, like all CT scans performed in the [facility], was performed utilizing techniques to minimize radiation dose exposure, including the use of iterative reconstruction and automated exposure control  IMAGE QUALITY:  Diagnostic  FINDINGS:  PARENCHYMA:  Decreased attenuation is noted in the supratentorial white matter demonstrating an appearance most consistent with mild microangiopathic change  No intracranial mass, mass effect or midline shift  No CT signs of acute infarction  There is no parenchymal hemorrhage  VENTRICLES AND EXTRA-AXIAL SPACES:  Normal for patient's age  VISUALIZED ORBITS AND PARANASAL SINUSES:  Orbits appear normal   Mild scattered sinus mucosal thickening is noted  No fluid levels are seen  CALVARIUM AND EXTRACRANIAL SOFT TISSUES:  No calvarial fracture  Stable subcutaneous soft tissue swelling along the posterior occiput near the vertex  Impression: No acute intracranial abnormality  Stable posterior soft tissue swelling unchanged from prior exam  Workstation performed: [id number]     Ct Head Without Contrast    Result Date: 10/8/2017  Narrative: CT BRAIN - WITHOUT CONTRAST INDICATION:  CLL  Pain at [place] of head with tender mass COMPARISON:  September 18, 2017 TECHNIQUE:  CT examination of the brain was performed  In addition to axial images, coronal reformatted images were created and submitted for interpretation  Radiation dose length product (DLP) for this visit:  1026 mGy-cm   This examination, like all CT scans performed in the [facility], was performed utilizing techniques to minimize radiation dose exposure, including the use of iterative reconstruction and automated exposure control  IMAGE QUALITY:  Diagnostic  FINDINGS:  PARENCHYMA: Age-related central atrophy  No intracranial mass, mass effect or midline shift  No CT signs of acute infarction  There is no parenchymal hemorrhage  VENTRICLES AND EXTRA-AXIAL SPACES:  Normal for patient's age  VISUALIZED ORBITS AND PARANASAL SINUSES:  Orbits appear normal  Moderate scattered sinus mucosal thickening is noted  Small amounts of fluid in the sphenoid sinuses  CALVARIUM AND EXTRACRANIAL SOFT TISSUES:  Soft tissue swelling posterior scalp at the vertex  Skin thickening  No focal mass or collection  This appears worse when compared to prior exam      Impression: No acute intracranial abnormality  Worsening Soft tissue swelling posterior scalp at the vertex  No focal mass or collection  Please correlate clinically for infection versus other etiologies including unlikely cutaneous CLL  Findings are consistent with the preliminary report from [facility] which was provided shortly after completion of the exam              Workstation performed: [id number]     Radiology Results    Result Date: 10/9/2017  Narrative: Ordered by an unspecified provider  Assessment and Plan 1  Pancytopenia secondary to Obinutuzumab, a week ago WBC of 4 30 with filgrastim however current WBC today of 0 83, ANC of 380, with low-grade fever, headache, CT scan did not show any bleeding, masses, leptomeningeal involvement, blood culture obtained, filgrastim 480 mcg subcu daily until [address] above 750  2  He just finished doxycycline 4 days ago for cellulitis of the scalp, I will continue doxycycline 100 mg p o  daily as prophylactic measures with food  3  CBC daily  4    CLL continue Ibrutinib 280 mg p o  daily

## 2017-10-28 NOTE — ED PROVIDER NOTES
History  Chief Complaint   Patient presents with    Cellulitis     Patient has had cellulitis on scalp since 10/7  Continues with pain in scalp  Currently being treated for leukemia  Patient also developed pain in arms today  78-year-old male presents to the emergency department with complaints of a headache  States that he has had generalized headache with worsening pain in the right temporal area over the past 1-2 weeks  States that he was recently seen in the emergency department and hospitalized for a cellulitis of his scalp  Reports that swelling has gone down and redness seems to have improved but he believes that he still has persistent headache and pain due to initial cellulitis  States that he has last chemo treatment for CLL 5 days ago  Temperature at home up to 100 1  taking Tylenol as needed for pain and fever  States that he has been taking Ultram for pain at home with some relief until the last 2 days  He denies pain in his neck  States that his right eye is sensitive to light and has had some tearing  He denies any visual changes or pain with movement of the eyes  History provided by:  Patient and spouse   used: No        Prior to Admission Medications   Prescriptions Last Dose Informant Patient Reported? Taking?    Ibrutinib 140 MG CAPS   Yes Yes   Sig: Take 280 mg by mouth daily   LORazepam (ATIVAN) 0 5 mg tablet   No No   Sig: Take 1 tablet by mouth every 8 (eight) hours as needed for anxiety for up to 10 days   Lancets (FREESTYLE) lancets   No Yes   Sig: Use as instructed--test 2 times a day    E 11 9   al mag oxide-diphenhydramine-lidocaine viscous (MAGIC MOUTHWASH)   No Yes   Sig: Take 10 mL by mouth 4 (four) times a day (before meals and at bedtime) for 30 days   aspirin 81 MG tablet   Yes Yes   Sig: Take 81 mg by mouth every other day Every other day    benzonatate (TESSALON) 200 MG capsule   Yes Yes   Sig: Take 200 mg by mouth 3 (three) times a day as needed for cough   citalopram (CeleXA) 40 mg tablet   Yes Yes   Sig: Take 40 mg by mouth daily   doxycycline (DORYX) 100 MG EC tablet   Yes Yes   Sig: Take 100 mg by mouth daily   fenofibrate (TRIGLIDE) 50 MG tablet   Yes Yes   Sig: Take 134 mg by mouth daily     folic acid (FOLVITE) 1 mg tablet   Yes Yes   Sig: Take 1 mg by mouth daily   gabapentin (NEURONTIN) 100 mg capsule   Yes Yes   Sig: Take 100 mg by mouth daily     glucose monitoring kit (FREESTYLE) monitoring kit   No Yes   Si each by Does not apply route as needed ( ) E 11 9   insulin glargine (LANTUS) 100 units/mL subcutaneous injection   No Yes   Sig: Inject 15 Units under the skin every morning   Patient taking differently: Inject 10 Units under the skin every morning     insulin lispro (HumaLOG) 100 units/mL injection   No Yes   Sig: Inject 10 Units under the skin 3 (three) times a day with meals   multivitamin (THERAGRAN) TABS   Yes Yes   Sig: Take 1 tablet by mouth daily   obinutuzumab (GAZYVA) 1000 mg/40 mL SOLN   Yes Yes   Sig: Infuse into a venous catheter   pantoprazole (PROTONIX) 40 mg tablet   Yes Yes   Sig: Take 40 mg by mouth 2 (two) times a day     predniSONE 20 mg tablet   No Yes   Sig: Take 1 tablet by mouth daily   sucralfate (CARAFATE) 1 g/10 mL suspension   No Yes   Sig: Take 10 mL by mouth 4 (four) times a day      Facility-Administered Medications: None       Past Medical History:   Diagnosis Date    CAD (coronary artery disease)     Cellulitis of scalp     CKD (chronic kidney disease) stage 3, GFR 30-59 ml/min     CLL (chronic lymphocytic leukemia) (HCC)     COPD (chronic obstructive pulmonary disease) (HCC)     Diabetes mellitus ([facility] [address] )     H/O blood clots     Hemolytic anemia (HCC)     History of TIA (transient ischemic attack)     Hyperlipidemia     Hypertension     Multiple gastric ulcers     Myocardial infarction     Recurrent sinusitis     Thrombocytopenia (HCC)     Vertigo        Past Surgical History: Procedure Laterality Date    ARTHROSCOPIC REPAIR ACL      lt knee    PORTACATH PLACEMENT      GA ESOPHAGOGASTRODUODENOSCOPY TRANSORAL DIAGNOSTIC N/A 10/5/2017    Procedure: ESOPHAGOGASTRODUODENOSCOPY (EGD) with bx;  Surgeon: Kelly Benitez DO;  Location: [facility]; Service: Gastroenterology    GA ESOPHAGOSCOPY FLEXIBLE TRANSORAL WITH BIOPSY N/A 9/2/2016    Procedure: ESOPHAGOGASTRODUODENOSCOPY (EGD); ESOPHAGEAL DILATION; ESOPHAGEAL BIOPSY;  Surgeon: Joey Salguero MD;  Location: [facility];  Service: Thoracic    TONSILLECTOMY      UPPER GASTROINTESTINAL ENDOSCOPY      x 6       History reviewed  No pertinent family history  I have reviewed and agree with the history as documented  Social History   Substance Use Topics    Smoking status: Former Smoker    Smokeless tobacco: Never Used      Comment: pt refuses to answer question    Alcohol use No        Review of Systems   Constitutional: Negative for activity change, appetite change, chills and fever  HENT: Negative for congestion, dental problem, drooling, ear discharge, ear pain, mouth sores, nosebleeds, rhinorrhea, sore throat and trouble swallowing  Eyes: Negative for pain, discharge and itching  Respiratory: Negative for cough, chest tightness, shortness of breath and wheezing  Cardiovascular: Negative for chest pain and palpitations  Gastrointestinal: Negative for abdominal pain, blood in stool, constipation, diarrhea, nausea and vomiting  Endocrine: Negative for cold intolerance and heat intolerance  Genitourinary: Negative for difficulty urinating, dysuria, flank pain, frequency and urgency  Skin: Negative for rash and wound  Allergic/Immunologic: Negative for food allergies and immunocompromised state  Neurological: Positive for headaches  Negative for dizziness, seizures, syncope, weakness and numbness  Psychiatric/Behavioral: Negative for agitation, behavioral problems and confusion         Physical Exam  ED Triage Vitals [10/28/17 1222]   Temperature Pulse Respirations Blood Pressure SpO2   98 °F (36 7 °C) 80 16 151/72 97 %      Temp Source Heart Rate Source Patient Position - Orthostatic VS BP Location FiO2 (%)   Temporal Monitor Sitting Right arm --      Pain Score       9           Orthostatic Vital Signs  Vitals:    10/28/17 1222 10/28/17 1424 10/28/17 1619   BP: 151/72 154/72 166/74   Pulse: 80 76 72   Patient Position - Orthostatic VS: Sitting Lying Lying       Physical Exam   Constitutional: He is oriented to person, place, and time  He appears well-developed and well-nourished  No distress  HENT:   Head: Normocephalic and atraumatic  Right Ear: External ear normal    Left Ear: External ear normal    Mouth/Throat: Oropharynx is clear and moist  No oropharyngeal exudate  No generalized erythema, or swelling of the scalp  Eyes: Conjunctivae and EOM are normal  Pupils are equal, round, and reactive to light  Neck: Normal range of motion  No JVD present  No tracheal deviation present  Cardiovascular: Normal rate, regular rhythm and normal heart sounds  Exam reveals no gallop and no friction rub  No murmur heard  Pulmonary/Chest: Effort normal and breath sounds normal  No respiratory distress  He has no wheezes  He has no rales  He exhibits no tenderness  Abdominal: Soft  Bowel sounds are normal  He exhibits no distension  There is no tenderness  There is no guarding  Musculoskeletal: Normal range of motion  He exhibits no edema, tenderness or deformity  Lymphadenopathy:     He has no cervical adenopathy  Neurological: He is alert and oriented to person, place, and time  Skin: Skin is warm and dry  No rash noted  He is not diaphoretic  No erythema  Psychiatric: He has a normal mood and affect  His behavior is normal    Nursing note and vitals reviewed        ED Medications  Medications   tbo-filgrastim (GRANIX) subcutaneous injection 480 mcg (not administered)   sodium chloride 0 9 % bolus 1,000 mL (0 mL Intravenous Stopped 10/28/17 1601)   HYDROmorphone (DILAUDID) 1 mg/mL injection 1 mg (1 mg Intravenous Given 10/28/17 1410)   tbo-filgrastim (GRANIX) subcutaneous injection 480 mcg (480 mcg Subcutaneous Given 10/28/17 1602)   HYDROmorphone (DILAUDID) 1 mg/mL injection 1 mg (1 mg Intravenous Given 10/28/17 1618)       Diagnostic Studies  Results Reviewed     Procedure Component Value Units Date/Time    Fingerstick Glucose (POCT) [[id number]]  (Abnormal) Collected:  10/28/17 1607    Lab Status:  Final result Updated:  10/28/17 1634     POC Glucose 193 (H) mg/dl     Sedimentation rate, automated [[id number]]  (Normal) Collected:  10/28/17 1357    Lab Status:  Final result Specimen:  Blood from Central Venous Line Updated:  10/28/17 1520     Sed Rate 4 mm/hour     POCT urinalysis dipstick [[id number]]  (Abnormal) Resulted:  10/28/17 1502    Lab Status:  Final result Specimen:  Urine Updated:  10/28/17 1502    ED Urine Macroscopic [[id number]]  (Normal) Collected:  10/28/17 1516    Lab Status:  Final result Specimen:  Urine Updated:  10/28/17 1502     Color, UA Yellow     Clarity, UA Clear     pH, UA 5 5     Leukocytes, UA Negative     Nitrite, UA Negative     Protein, UA Negative mg/dl      Glucose, UA Negative mg/dl      Ketones, UA Negative mg/dl      Urobilinogen, UA 0 2 E U /dl      Bilirubin, UA Negative     Blood, UA Negative     Specific Gravity, UA 1 020    Narrative:       CLINITEK RESULT    CBC and differential [[id number]]  (Abnormal) Collected:  10/28/17 1357    Lab Status:  Final result Specimen:  Blood from Central Venous Line Updated:  10/28/17 1435     WBC 0 83 (LL) Thousand/uL      RBC 3 59 (L) Million/uL      Hemoglobin 10 5 (L) g/dL      Hematocrit 33 5 (L) %      MCV 93 fL      MCH 29 2 pg      MCHC 31 3 (L) g/dL      RDW 16 6 (H) %      MPV 11 6 fL      Platelets 46 (LL) Thousands/uL      nRBC 0 /100 WBCs      Neutrophils Relative 45 %      Lymphocytes Relative 21 %      Monocytes Relative 27 (H) %      Eosinophils Relative 6 %      Basophils Relative 1 %      Neutrophils Absolute 0 38 (L) Thousands/µL      Lymphocytes Absolute 0 17 (L) Thousands/µL      Monocytes Absolute 0 22 Thousand/µL      Eosinophils Absolute 0 05 Thousand/µL      Basophils Absolute 0 01 Thousands/µL     Blood culture #1 [[id number]] Collected:  10/28/17 1422    Lab Status: In process Specimen:  Blood from Central Venous Line Updated:  10/28/17 1426    Comprehensive metabolic panel [[id number]]  (Abnormal) Collected:  10/28/17 1357    Lab Status:  Final result Specimen:  Blood from Central Venous Line Updated:  10/28/17 1425     Sodium 140 mmol/L      Potassium 4 0 mmol/L      Chloride 103 mmol/L      CO2 30 mmol/L      Anion Gap 7 mmol/L      BUN 23 mg/dL      Creatinine 1 03 mg/dL      Glucose 147 (H) mg/dL      Calcium 8 8 mg/dL      AST 12 U/L      ALT 20 U/L      Alkaline Phosphatase 35 (L) U/L      Total Protein 5 9 (L) g/dL      Albumin 3 8 g/dL      Total Bilirubin 0 84 mg/dL      eGFR 77 ml/min/1 73sq m     Narrative:         National Kidney Disease Education Program recommendations are as follows:  GFR calculation is accurate only with a steady state creatinine  Chronic Kidney disease less than 60 ml/min/1 73 sq  meters  Kidney failure less than 15 ml/min/1 73 sq  meters  Protime-INR [[id number]]  (Normal) Collected:  10/28/17 1357    Lab Status:  Final result Specimen:  Blood from Central Venous Line Updated:  10/28/17 1418     Protime 14 3 seconds      INR 1 11    APTT [[id number]]  (Normal) Collected:  10/28/17 1357    Lab Status:  Final result Specimen:  Blood from Central Venous Line Updated:  10/28/17 1418     PTT 30 seconds     Narrative: Therapeutic Heparin Range = 60-90 seconds    Influenza A/B and RSV by PCR (indicated for patients >2 mo of age) [[id number]] Collected:  10/28/17 1358    Lab Status:   In process Specimen:  Nasopharyngeal from Nasopharyngeal Swab Updated:  10/28/17 1402 CT head without contrast   Final Result by Cora Farfan MD (10/28 1034)      No acute intracranial abnormality  Stable posterior soft tissue swelling unchanged from prior exam          Workstation performed: [id number]                    Procedures  Procedures       Phone Contacts  ED Phone Contact    ED Course  ED Course as of Oct 28 1643   Sat Oct 28, 2017   1355 Adamantly refusing peripheral access    [id number] Spoke with Dr Halie Madrigal  States that the patient is to have a Neupogen injection 480mcg SQ x1  Will come to see in the emergency department since he is rounding the hospital      with Dr Halie Madrigal  Not comfortable with discharge to home  Recommends admission with additional Neupogen injection tomorrow  MDM  Number of Diagnoses or Management Options  Diagnosis management comments: Differential diagnosis includes but not limited to: Intractable headache, cellulitis, abscess, temporal arteritis         Amount and/or Complexity of Data Reviewed  Clinical lab tests: ordered and reviewed  Tests in the radiology section of CPT®: ordered and reviewed  Review and summarize past medical records: yes      CritCare Time    Disposition  Final diagnoses:   [facility]rop[facility] )   Intractable headache, unspecified chronicity pattern, unspecified headache type     Time reflects when diagnosis was documented in both MDM as applicable and the Disposition within this note     Time User Action Codes Description Comment    10/28/2017  4:01 PM Joy Sutton Add [R51] Nonintractable headache, unspecified chronicity pattern, unspecified headache type     10/28/2017  4:01 PM Joy Sutton Add [D70 1,  T45 1X5A] Chemotherapy-induced neutropenia ([facility] [address] )     10/28/2017  4:34 PM Joy Sutton Modify [D70 1,  T45 1X5A] Chemotherapy-induced neutropenia ([facility] [address] )     10/28/2017  4:34 PM Joy Sutton Remove [R51] Nonintractable headache, unspecified chronicity pattern, unspecified headache type     10/28/2017  4:34 PM Lucila Mcdaniel Add [R51] Intractable headache, unspecified chronicity pattern, unspecified headache type       ED Disposition     ED Disposition Condition Comment    Admit  Case was discussed with MARIELA and the patient's admission status was agreed to be Admission Status: inpatient status to the service of Dr Marina Pederson   Follow-up Information     Follow up With Specialties Details Why Emmanuel Michaels MD Internal Medicine   [address]  [address] [place] [address] [address]  [address] [facility] [address]          Patient's Medications   Discharge Prescriptions    MORPHINE (MSIR) 15 MG TABLET    Take 1 tablet by mouth every 6 (six) hours as needed for severe pain for up to 3 days Max Daily Amount: 60 mg       Start Date: 10/28/2017End Date: 10/31/2017       Order Dose: 15 mg       Quantity: 12 tablet    Refills: 0     No discharge procedures on file      ED Provider  Electronically Signed by           Dwaine Espinal PA-C  10/28/17 5375

## 2017-10-28 NOTE — ED NOTES
Pt refusing peripheral sticks for blood work  One set of blood cultures obtained from [facility] by Prashant Pepper RN   Per Malena Lozano, only obtain on set of blood cultures at this time      Jaymie Scott RN  10/28/17 8724

## 2017-10-29 LAB
FLUAV AG SPEC QL: NORMAL
FLUBV AG SPEC QL: NORMAL
GLUCOSE SERPL-MCNC: 132 MG/DL (ref 65–140)
GLUCOSE SERPL-MCNC: 211 MG/DL (ref 65–140)
GLUCOSE SERPL-MCNC: 76 MG/DL (ref 65–140)
GLUCOSE SERPL-MCNC: 83 MG/DL (ref 65–140)
RSV B RNA SPEC QL NAA+PROBE: NORMAL

## 2017-10-29 PROCEDURE — 82948 REAGENT STRIP/BLOOD GLUCOSE: CPT

## 2017-10-29 RX ORDER — METOCLOPRAMIDE HYDROCHLORIDE 5 MG/ML
10 INJECTION INTRAMUSCULAR; INTRAVENOUS EVERY 6 HOURS PRN
Status: DISCONTINUED | OUTPATIENT
Start: 2017-10-29 | End: 2017-11-03 | Stop reason: HOSPADM

## 2017-10-29 RX ORDER — HYDROMORPHONE HCL 110MG/55ML
1 PATIENT CONTROLLED ANALGESIA SYRINGE INTRAVENOUS
Status: DISCONTINUED | OUTPATIENT
Start: 2017-10-29 | End: 2017-10-30

## 2017-10-29 RX ORDER — SUMATRIPTAN 6 MG/.5ML
6 INJECTION, SOLUTION SUBCUTANEOUS ONCE
Status: COMPLETED | OUTPATIENT
Start: 2017-10-29 | End: 2017-10-29

## 2017-10-29 RX ADMIN — INSULIN LISPRO 10 UNITS: 100 INJECTION, SOLUTION INTRAVENOUS; SUBCUTANEOUS at 08:06

## 2017-10-29 RX ADMIN — LIDOCAINE HYDROCHLORIDE 10 ML: 20 SOLUTION ORAL; TOPICAL at 11:46

## 2017-10-29 RX ADMIN — PANTOPRAZOLE SODIUM 40 MG: 40 TABLET, DELAYED RELEASE ORAL at 17:00

## 2017-10-29 RX ADMIN — LIDOCAINE HYDROCHLORIDE 10 ML: 20 SOLUTION ORAL; TOPICAL at 21:29

## 2017-10-29 RX ADMIN — TBO-FILGRASTIM 480 MCG: 480 INJECTION, SOLUTION SUBCUTANEOUS at 11:49

## 2017-10-29 RX ADMIN — BUTALBITAL, ACETAMINOPHEN, AND CAFFEINE 1 TABLET: 50; 325; 40 TABLET ORAL at 04:45

## 2017-10-29 RX ADMIN — GABAPENTIN 100 MG: 100 CAPSULE ORAL at 08:06

## 2017-10-29 RX ADMIN — SUCRALFATE 1000 MG: 1 SUSPENSION ORAL at 21:29

## 2017-10-29 RX ADMIN — DOXYCYCLINE 100 MG: 100 CAPSULE ORAL at 08:06

## 2017-10-29 RX ADMIN — SUCRALFATE 1000 MG: 1 SUSPENSION ORAL at 17:00

## 2017-10-29 RX ADMIN — ASPIRIN 81 MG 81 MG: 81 TABLET ORAL at 08:06

## 2017-10-29 RX ADMIN — LORAZEPAM 0.5 MG: 0.5 TABLET ORAL at 06:32

## 2017-10-29 RX ADMIN — CITALOPRAM HYDROBROMIDE 40 MG: 20 TABLET ORAL at 08:06

## 2017-10-29 RX ADMIN — SUCRALFATE 1000 MG: 1 SUSPENSION ORAL at 11:45

## 2017-10-29 RX ADMIN — LIDOCAINE HYDROCHLORIDE 10 ML: 20 SOLUTION ORAL; TOPICAL at 08:10

## 2017-10-29 RX ADMIN — SUMATRIPTAN 6 MG: 6 INJECTION, SOLUTION SUBCUTANEOUS at 06:15

## 2017-10-29 RX ADMIN — LIDOCAINE HYDROCHLORIDE 10 ML: 20 SOLUTION ORAL; TOPICAL at 17:00

## 2017-10-29 RX ADMIN — INSULIN LISPRO 10 UNITS: 100 INJECTION, SOLUTION INTRAVENOUS; SUBCUTANEOUS at 11:46

## 2017-10-29 RX ADMIN — INSULIN LISPRO 10 UNITS: 100 INJECTION, SOLUTION INTRAVENOUS; SUBCUTANEOUS at 17:01

## 2017-10-29 RX ADMIN — HYDROMORPHONE HYDROCHLORIDE 1 MG: 2 INJECTION, SOLUTION INTRAMUSCULAR; INTRAVENOUS; SUBCUTANEOUS at 08:32

## 2017-10-29 RX ADMIN — HYDROMORPHONE HYDROCHLORIDE 1 MG: 2 INJECTION, SOLUTION INTRAMUSCULAR; INTRAVENOUS; SUBCUTANEOUS at 18:28

## 2017-10-29 RX ADMIN — INSULIN LISPRO 2 UNITS: 100 INJECTION, SOLUTION INTRAVENOUS; SUBCUTANEOUS at 17:01

## 2017-10-29 RX ADMIN — PANTOPRAZOLE SODIUM 40 MG: 40 TABLET, DELAYED RELEASE ORAL at 06:33

## 2017-10-29 RX ADMIN — PREDNISONE 20 MG: 20 TABLET ORAL at 08:06

## 2017-10-29 RX ADMIN — FOLIC ACID 1 MG: 1 TABLET ORAL at 08:06

## 2017-10-29 RX ADMIN — FONDAPARINUX SODIUM 2.5 MG: 2.5 INJECTION, SOLUTION SUBCUTANEOUS at 08:11

## 2017-10-29 RX ADMIN — HYDROMORPHONE HYDROCHLORIDE 1 MG: 2 INJECTION, SOLUTION INTRAMUSCULAR; INTRAVENOUS; SUBCUTANEOUS at 01:00

## 2017-10-29 RX ADMIN — INSULIN GLARGINE 10 UNITS: 100 INJECTION, SOLUTION SUBCUTANEOUS at 08:06

## 2017-10-29 RX ADMIN — SUCRALFATE 1000 MG: 1 SUSPENSION ORAL at 06:33

## 2017-10-29 RX ADMIN — HYDROMORPHONE HYDROCHLORIDE 1 MG: 2 INJECTION, SOLUTION INTRAMUSCULAR; INTRAVENOUS; SUBCUTANEOUS at 22:18

## 2017-10-29 NOTE — CASE MANAGEMENT
Initial Clinical Review    Admission: Date/Time/Statement: 10/28/17 @ 1642     Orders Placed This Encounter   Procedures    Inpatient Admission (expected length of stay for this patient is greater than two midnights)     Standing Status:   Standing     Number of Occurrences:   1     Order Specific Question:   Admitting Physician     Answer:   Rick Davis     Order Specific Question:   Level of Care     Answer:   Med Surg [16]     Order Specific Question:   Estimated length of stay     Answer:   More than 2 Midnights     Order Specific Question:   Certification     Answer:   I certify that inpatient services are medically necessary for this patient for a duration of greater than two midnights  See H&P and MD Progress Notes for additional information about the patient's course of treatment  ED: Date/Time/Mode of Arrival:   ED Arrival Information     Expected Arrival Acuity Means of Arrival Escorted By Service Admission Type    - 10/28/2017 12:17 Urgent Walk-In Family Member General Medicine Urgent    Arrival Complaint    Cellulitis          Chief Complaint:   Chief Complaint   Patient presents with    Cellulitis     Patient has had cellulitis on scalp since 10/7  Continues with pain in scalp  Currently being treated for leukemia  Patient also developed pain in arms today  History of Illness: Maral Kenyon is a 60 y o  male who presents with PMH of pancytopenia, CLL with hemolytic anemia diabetes,  Heparin-induced thrombocytopenia coming in the ED for low-grade fever 100 1 and worsening headaches over the last 2 days  Patient was previously seen and treated from 10/7/2017 to 10/14/2017 for a fever 102 and scalp cellulitis  He was treated with IV vancomycin and seen by infectious disease  He had successful transition to p  o  Keflex and doxycycline which he just finished 4 days ago    Patient reports that he is doing well although he has had the onset of a posterior headache described as burning bilaterally at the occiput as well as a right-sided stabbing headache at the [facility] above the eye  He reports this comes and goes and is improved somewhat with Tylenol although at the last couple days the pain has been increasing in severity  Reports it is associated with photosensitivity but no fullness sensitivity, nausea but no vomiting  He has had no change in vision  He has had no weakness or numbness or tingling from his baseline diabetic neuropathy  He has no weakness in 1 arm or leg or the other or facial droop      He otherwise reports no sore throat, runny nose, sinus congestion, nausea or vomiting or diarrhea or abdominal pain, no chest pain or shortness of breath, no burning with urination or increased frequency  He reports no other rashes or erythema  He reports his scalp erythema is resolved  ED Vital Signs:   ED Triage Vitals [10/28/17 1222]   Temperature Pulse Respirations Blood Pressure SpO2   98 °F (36 7 °C) 80 16 151/72 97 %      Temp Source Heart Rate Source Patient Position - Orthostatic VS BP Location FiO2 (%)   Temporal Monitor Sitting Right arm --      Pain Score       9        Wt Readings from Last 1 Encounters:   10/28/17 97 kg (213 lb 13 5 oz)       Vital Signs (abnormal):      Abnormal Labs/Diagnostic Test Results:   WBC Thousand/uL 0 83*   HEMOGLOBIN g/dL 10 5*   HEMATOCRIT % 33 5*   PLATELETS Thousands/uL 46*   NEUTROS PCT % 45   LYMPHS PCT % 21   MONOS PCT % 27*   EOS PCT % 6         Results from last 7 days  Lab Units 10/28/17  1357   SODIUM mmol/L 140   POTASSIUM mmol/L 4 0   CHLORIDE mmol/L 103   CO2 mmol/L 30   BUN mg/dL 23   CREATININE mg/dL 1 03   CALCIUM mg/dL 8 8   TOTAL PROTEIN g/dL 5 9*   BILIRUBIN TOTAL mg/dL 0 84   ALK PHOS U/L 35*   ALT U/L 20   AST U/L 12   GLUCOSE RANDOM mg/dL 147*         Results from last 7 days  Lab Units 10/28/17  1357   INR   1 11         Imaging: I have personally reviewed pertinent reports        Xr Chest 2 Views     Result Date: 10/8/2017  Narrative: CHEST - DUAL ENERGY INDICATION: 28-year-old male, fever COMPARISON: 9/18/2017 chest x-ray VIEWS:  PA (including soft tissue/bone algorithms) and lateral projections; 4 images FINDINGS: The lungs are clear  No pleural effusions  The cardiomediastinal silhouette is unremarkable  Bony thorax is unremarkable  Right-sided portacatheter, typical appearance, terminating at cavoatrial junction  No pneumothorax Findings are stable      Impression: No acute cardiopulmonary disease, stable                               Findings are consistent with emergency room physician's preliminary reading  Workstation performed: [id number]      Ct Head Without Contrast     Result Date: 10/28/2017  Narrative: CT BRAIN - WITHOUT CONTRAST INDICATION:  Headache COMPARISON:  CT head 10/7/2017 TECHNIQUE:  CT examination of the brain was performed  In addition to axial images, coronal reformatted images were created and submitted for interpretation  Radiation dose length product (DLP) for this visit:  1068 mGy-cm   This examination, like all CT scans performed in the [facility], was performed utilizing techniques to minimize radiation dose exposure, including the use of iterative reconstruction and automated exposure control  IMAGE QUALITY:  Diagnostic  FINDINGS:  PARENCHYMA:  Decreased attenuation is noted in the supratentorial white matter demonstrating an appearance most consistent with mild microangiopathic change  No intracranial mass, mass effect or midline shift  No CT signs of acute infarction  There is no parenchymal hemorrhage  VENTRICLES AND EXTRA-AXIAL SPACES:  Normal for patient's age  VISUALIZED ORBITS AND PARANASAL SINUSES:  Orbits appear normal   Mild scattered sinus mucosal thickening is noted  No fluid levels are seen  CALVARIUM AND EXTRACRANIAL SOFT TISSUES:  No calvarial fracture  Stable subcutaneous soft tissue swelling along the posterior occiput near the vertex     Impression: No acute intracranial abnormality  Stable posterior soft tissue swelling unchanged from prior exam  Workstation performed: [id number]      Ct Head Without Contrast     Result Date: 10/8/2017  Narrative: CT BRAIN - WITHOUT CONTRAST INDICATION:  CLL  Pain at [place] of head with tender mass COMPARISON:  September 18, 2017 TECHNIQUE:  CT examination of the brain was performed  In addition to axial images, coronal reformatted images were created and submitted for interpretation  Radiation dose length product (DLP) for this visit:  1026 mGy-cm   This examination, like all CT scans performed in the [facility], was performed utilizing techniques to minimize radiation dose exposure, including the use of iterative reconstruction and automated exposure control  IMAGE QUALITY:  Diagnostic  FINDINGS:  PARENCHYMA: Age-related central atrophy  No intracranial mass, mass effect or midline shift  No CT signs of acute infarction  There is no parenchymal hemorrhage  VENTRICLES AND EXTRA-AXIAL SPACES:  Normal for patient's age  VISUALIZED ORBITS AND PARANASAL SINUSES:  Orbits appear normal  Moderate scattered sinus mucosal thickening is noted  Small amounts of fluid in the sphenoid sinuses  CALVARIUM AND EXTRACRANIAL SOFT TISSUES:  Soft tissue swelling posterior scalp at the vertex  Skin thickening  No focal mass or collection  This appears worse when compared to prior exam       Impression: No acute intracranial abnormality  Worsening Soft tissue swelling posterior scalp at the vertex  No focal mass or collection  Please correlate clinically for infection versus other etiologies including unlikely cutaneous CLL   Findings are consistent with the preliminary report from [facility] which was provided shortly after completion of the exam              Workstation performed: [id number]      Radiology Results     Result Date: 10/9/2017  Narrative: Ordered by an unspecified provider         EKG, Pathology, and Other Studies Reviewed on Admission:   · EKG: none       ED Treatment:   Medication Administration from 10/28/2017 1217 to 10/28/2017 1751       Date/Time Order Dose Route Action Action by Comments     10/28/2017 1601 sodium chloride 0 9 % bolus 1,000 mL 0 mL Intravenous Stopped Shirley Hernandez RN      10/28/2017 1356 sodium chloride 0 9 % bolus 1,000 mL 1,000 mL Intravenous New Bag Tone Hernandez RN      10/28/2017 1410 HYDROmorphone (DILAUDID) 1 mg/mL injection 1 mg 1 mg Intravenous Given Cy Mccauley RN      10/28/2017 1602 tbo-filgrastim (GRANIX) subcutaneous injection 480 mcg 480 mcg Subcutaneous Given Tone Hernandez RN      10/28/2017 1618 HYDROmorphone (DILAUDID) 1 mg/mL injection 1 mg 1 mg Intravenous Given Rich Ga RN           Past Medical/Surgical History:    Active Ambulatory Problems     Diagnosis Date Noted    CAD (coronary artery disease) 01/01/2004    CLL [facility]chronic lymphocytic leukemia) ([facility] )     Hyperlipidemia     Hypertension     History of TIA (transient ischemic attack)     Gastroesophageal reflux disease with esophagitis 01/18/2017    Candida esophagitis ([facility]) 01/18/2017    CKD (chronic kidney disease) stage 3, GFR 30-59 ml/min     H/O blood clots     Hemolytic anemia ([facility]) 03/06/2017    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[facility]) 03/06/2017    Anemia, chronic disease 03/10/2017    Chronic lymphocytic leukemia ([facility] ) 03/20/2017    Leukopenia due to antineoplastic chemotherapy ([facility] ) 09/18/2017    Hyperglycemia 09/18/2017    FUO (fever of unknown origin) 10/08/2017    Cellulitis of head or scalp 10/08/2017     Resolved Ambulatory Problems     Diagnosis Date Noted    GI bleed 03/10/2017     Past Medical History:   Diagnosis Date    CAD (coronary artery disease) 2004    Cellulitis of scalp     CKD (chronic kidney disease) stage 3, GFR 30-59 ml/min     CLL [facility]chronic lymphocytic leukemia) ([facility])     COPD (chronic obstructive pulmonary disease) ([facility] )     Diabetes mellitus ([facility] )     H/O blood clots     Hemolytic anemia (HCC)     History of TIA (transient ischemic attack)     Hyperlipidemia     Hypertension     Multiple gastric ulcers     Myocardial infarction     Recurrent sinusitis     Thrombocytopenia (HCC)     Vertigo        Admitting Diagnosis: Cellulitis [L03 90]  Chemotherapy-induced neutropenia (HCC) [D70 1, T45 1X5A]  Intractable headache, unspecified chronicity pattern, unspecified headache type [R51]    Age/Sex: 60 y o  male    Assessment/Plan: Assessment/Plan:     Hospital Problem List:      Active Problems:    FUO (fever of unknown origin)    Pancytopenia ([facility] )        Plan for the Primary Problem(s):  · FUO  ? Low grade fever of Tmax of 100 1 in OP setting in the setting of worsening neutropenia in a pt w/CLL and w/recent scalp cellulitis w/IP admission and treatment w/IP Vancomycin with transition to PO keflex/docycycline which pt just completed 4d prior  ? No sepsis here, CT head demonstrates stability of prior posterior scalp/soft tissue edema, no evidence of cellulitis or abscess on PE today, UA negative  ? 1 blood cx drawn per portacath, ED providered ordered flu cx although pt currently asymptomatic will f/u  ? Pt d/w hem/onc will monitor and treat worsening leukocytopenia  ? Will check 2nd blood cx, of RUE as pt has agreed to 2ndmonitor carefully off abx at this time        Spoke w/Dr Marti Thomas by phone from ID who recommended a one time dose of cefepime 2g at this time and close monitoring by ID in addition to continued doxycycline as outlined by hem/onc     Plan for Additional Problems:   · Pancytopenia  ? Appreciate hem/onc consult, likely 2* obinutuzumab  ? Leukocytopenia- with wbc of 4 3 one week prior w/filgrastim today worsened to wbc of 0 83 w/ANC of 380  ? granix 480mcg subq daily until [address] >750 per hem/onc     ?  Thrombocytopenia- 46 from 96 5 days prior, 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 avoid heparin  ? Continue to monitor, transfuse PRN     Anemia        Hx of hemolytic anemia        hgb stable from 9 7-10 5 normocytic        Continue to monitor, transfuse PRN     CLL        Continue ibrutinib 280mg daily        Pt will have to bring home supply, this was d/w pt at bedside     DM        2* steroids        Continue OP regimen     Candidal esophagitis        Continue OP sucralfate, PPI, magic mouthwash                   VTE Prophylaxis: Fondaparinux (Arixtra)  / sequential compression device   Code Status: Full Code  POLST: There is no POLST form on file for this patient (pre-hospital)     Anticipated Length of Stay:  Patient will be admitted on an Inpatient basis with an anticipated length of stay of  Greater than 2 midnights     Justification for Hospital Stay: FUO worsening pancytopenia       Admission Orders:  [email or web address] [email or web address]  OOB  NEUTROPENIC PRECAUTIONS  AIRBORNE ISOLATION  REG DIET  DROPLET ISOLATION  CONSULT ID  SEQ COMP DEVICE    Scheduled Meds:   al mag oxide-diphenhydramine-lidocaine viscous 10 mL Oral 4x Daily (AC & HS)   aspirin 81 mg Oral Every Other Day   citalopram 40 mg Oral Daily   doxycycline hyclate 100 mg Oral Daily   folic acid 1 mg Oral Daily   fondaparinux 2 5 mg Subcutaneous Daily   gabapentin 100 mg Oral Daily   insulin glargine 10 Units Subcutaneous QAM   insulin lispro 1-6 Units Subcutaneous 4x Daily (AC & HS)   insulin lispro 10 Units Subcutaneous TID With Meals   pantoprazole 40 mg Oral BID AC   predniSONE 20 mg Oral Daily   sucralfate 1,000 mg Oral 4x Daily (AC & HS)     Continuous Infusions:    PRN Meds:   acetaminophen    benzonatate    butalbital-acetaminophen-caffeine    HYDROmorphone X2    LORazepam X1    metoclopromide    ondansetron    PRIOR  Admission Date: 10/7/2017       Discharge Date: 10/14/17  Primary Care Physician at Discharge: Wesly Da Silva [phone]     Primary Discharge Diagnosis  Chronic lymphocytic leukemia/ chronic hemolytic anemia  Leukopenia/thrombocytopenia/anemia - pancytopenia  Scalp cellulitis  HSV esophagitis/ persistent esophageal ulceration  Chronic kidney disease stage I , serum creatinine 1 01, GFR 79  Allergy to heparin  COPD  Coronary artery disease  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 ) 09/18/2017    Hyperglycemia 09/18/2017    Chronic lymphocytic leukemia ([place] [address] ) 03/20/2017    Anemia, chronic disease 03/10/2017    Hemolytic anemia (HCC) 03/06/2017    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[facility] ) 03/06/2017    H/O blood clots      Gastroesophageal reflux disease with esophagitis 01/18/2017    Candida esophagitis (HCC) 01/18/2017    CLL (chronic lymphocytic leukemia) (HCC)      Hyperlipidemia      Hypertension      History of TIA (transient ischemic attack)      CKD (chronic kidney disease) stage 3, GFR 30-59 ml/min      CAD (coronary artery disease) 01/01/2004         Consulting Providers   Dr Antonio Soto Hematology  Dr Santosh Ly the Infectious Disease     Diagnostic Procedures Performed  CT BRAIN - WITHOUT CONTRAST     INDICATION:  CLL  Pain at [place] of head with tender mass     COMPARISON:  September 18, 2017     TECHNIQUE:  CT examination of the brain was performed   In addition to axial images, coronal reformatted images were created and submitted for interpretation        Radiation dose length product (DLP) for this visit:  8650 mGy-cm    This examination, like all CT scans performed in the [facility], was performed utilizing techniques to minimize radiation dose exposure, including the use of iterative   reconstruction and automated exposure control        IMAGE QUALITY:  Diagnostic      FINDINGS:     PARENCHYMA: Age-related central atrophy     No intracranial mass, mass effect or midline shift   No CT signs of acute infarction   There is no parenchymal hemorrhage           VENTRICLES AND EXTRA-AXIAL SPACES:  Normal for patient's age      VISUALIZED ORBITS AND PARANASAL SINUSES:  Orbits appear normal  Moderate scattered sinus mucosal thickening is noted  Small amounts of fluid in the sphenoid sinuses      CALVARIUM AND EXTRACRANIAL SOFT TISSUES:  Soft tissue swelling posterior scalp at the vertex  Skin thickening  No focal mass or collection  This appears worse when compared to prior exam         IMPRESSION:  No acute intracranial abnormality  Worsening Soft tissue swelling posterior scalp at the vertex  No focal mass or collection  Please correlate clinically for infection versus other etiologies including unlikely cutaneous CLL        CHEST - DUAL ENERGY     INDICATION: 71-year-old male, fever  COMPARISON: 9/18/2017 chest x-ray     VIEWS:  PA (including soft tissue/bone algorithms) and lateral projections; 4 images     FINDINGS:     The lungs are clear  No pleural effusions  The cardiomediastinal silhouette is unremarkable  Bony thorax is unremarkable  Right-sided portacatheter, typical appearance, terminating at cavoatrial junction    No pneumothorax     Findings are stable     IMPRESSION:     No acute cardiopulmonary disease, stable         Findings are consistent with emergency room physician's preliminary reading         Treatments      IV vancomycin as per Infectious Disease team  Blood cultures remained negative  Given filgrastim     Procedures   Imaging studies, blood cultures, routine lab work      Other Pertinent Test Results     Results from last 7 days  Lab Units 10/13/17  0518   10/07/17  2336   WBC Thousand/uL 2 19*  < > 1 38*   HEMOGLOBIN g/dL 9 4*  < > 10 1*   HEMATOCRIT % 30 1*  < > 31 1*   PLATELETS Thousands/uL 70*  < > 93*   INR    --   --  1 19*   < > = values in this interval not displayed      Results from last 7 days  Lab Units 10/12/17  0614   10/08/17  0546   SODIUM mmol/L 144  < > 140   POTASSIUM mmol/L 3 5  < > 3 8   CHLORIDE mmol/L 107  < > 105   CO2 mmol/L 31  < > 28   BUN mg/dL 16  < > 18   CREATININE mg/dL 0 97  < > 1 00   CALCIUM mg/dL 9 1  < > 8 6   TOTAL PROTEIN g/dL  --   --  5 4*   BILIRUBIN TOTAL mg/dL  --   --  0 72   ALK PHOS U/L  --   --  27*   ALT U/L  --   --  19   AST U/L  --   --  15   GLUCOSE RANDOM mg/dL 96  < > 154*   < > = values in this interval not displayed  Lab Results   Component Value Date     BLOODCX No Growth After 5 Days  10/07/2017     BLOODCX No Growth After 5 Days  10/07/2017            Presenting Problem/History of Present Illness  Cellulitis of head or scalp     HOSPITAL COURSE:  A 76-year-old gentleman who has long history of chronic lymphocytic leukemia, which was initially diagnosed in 2001  Rl Hussein has been under the care of Dr Priti Can for 12 years  Rl Hussein has been extensively treated with multiple line of chemotherapy, monoclonal antibody as well as targeted treatment   Most recently, he has been on obinutuzumab and [place] Breeding, he took last ibrutinib last week, since his funding run out for this medication  Rl Hussein came in with complaint of fever, [address] has been having some skin redness over the top of the head   This skin lesion was erythematous   This was thought to be scalp cellulitis  Rl Hussein  was treated with IV vancomycin  with quick resolution of fever and erythematous skin lesions   Currently, he feels well  He has no respiratory symptoms   He denied any pain   He has pancytopenia   His neutrophil was less than 0 5 which has since improved    He is started on G-CSF   His performance status appeared to be 0 to 1/4 on the ECOG scale         PHYSICAL EXAM:  Vitals:   Temp:  [96 °F (35 6 °C)-98 1 °F (36 7 °C)] 96 °F (35 6 °C)  HR:  [72-76] 74  Resp:  [18] 18  BP: (143-150)/(73-89) 146/89     General Appearance:    Alert, cooperative, no distress   Head:    Normocephalic without obvious abnormality   Eyes:    PERRL, conjunctiva/corneas clear, EOM's intact        Neck:   Supple, no adenopathy, no JVD   Back:     Symmetric, no spinal or CVA tenderness   Lungs:     Clear to auscultation bilaterally, no wheezing or rhonchi    Right-sided pectoral Port-A-Cath  Heart:    Regular rate and rhythm, S1 and S2 normal, no murmur   Abdomen:     Soft, non-tender, bowel sounds active    Extremities:   Extremities normal  No clubbing, cyanosis or edema   Psych:   Normal Affect   Neurologic:   CNII-XII intact  Strength symmetric, speech intact         Discharge Disposition: Home/Self Care  Facility:  Home     Discharge Medications:  See after visit summary for reconciled discharge medications provided to patient and family        Allergies        Allergies   Allergen Reactions    Heparin Other (See Comments)       clot    Macrolides And Ketolides Other (See Comments)       Interacts with other meds he is taking         Diet restrictions:  Diabetic  Activity restrictions:  None  Discharge Condition: fair     Outpatient Follow-Up     Infectious Disease  Gastroenterology  Hematology Oncology     Code Status: Level 1 - Full Code  Advance Directive and Living Will: <no information>  Power of :       Discharge Statement   I spent 35 minutes discharging the patient  This time was spent on the day of discharge  Greater than 50% of total time was spent with the patient and / or family counseling and / or coordination of care        Routing History

## 2017-10-29 NOTE — PROGRESS NOTES
[facility] Progress Note  Patient: Erika Retana 60 y o  male   MRN: [id number]  PCP: Kendrick Carreno MD  Unit/Bed#: E2 -01 Encounter: [id number]  Date Of Visit: 10/29/17    Assessment:  26-year-old with history of chronic lymphocytic leukemia who has been diagnosis as far back as 2001 and has been having ongoing chemotherapy off and on in the past with Cytoxan prednisone Oncovin and developed a known immune hemolytic anemia that is been poorly responsive with therapy including IVIG and rituximab  Recent complications and included herpes esophagitis treated with acyclovir has been diagnosed with extensive lymphadenopathy with most recent treatments of Venetoclax also being poorly responsive and now on monthly Obintuzumab/filgasrim support and Ibrutinib 100 mg daily he then developed a severe scalp cellulitis and low-grade temperature elevation with entrance white blood cell count of 0 83 and hemoglobin 10 5 and platelets [id number]    Unclear when he had his last filgasrim dosing but now ordered daily by Oncology    Principal Problem:    Pancytopenia ([address] )  Active Problems:    CKD (chronic kidney disease) stage 3, GFR 30-59 ml/min    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)    Leukopenia due to antineoplastic chemotherapy (HCC)    FUO (fever of unknown origin)      Plan:    · Pancytopenia/leukopenia due to chemotherapy has been empirically started on cefepime awaiting ID input and will be continued on neutropenic precautions and filgastrim 480 mcg daily per Oncology till absolute neutrophil count above 750  · Intractable headache unclear whether in relation to recent scalp cellulitis CT imaging unremarkable continues MRI imaging would be prudent/will continue outpatient course of doxycycline for now  · History of heparin induced thrombocytopenia will continue DVT prophylaxis with Arixtra  · Fever of unknown origin on empiric cefepime await ID input and culture results/history of recent herpes esophagitis presently no throat or swallowing symptoms and oral examination unremarkable will continue sucralfate/rapid flu swab negative  · Developed hyperglycemia from steroid management in setting of type 2 diabetes remains on steroid management  · CLL to continue on Ibrutinib 280 mg p o  daily      VTE Pharmacologic Prophylaxis:   Pharmacologic: Fondaparinux (Arixtra)  Mechanical VTE Prophylaxis in Place: Yes    Discussions with Specialists or Other Care Team Provider: no    Time Spent for Care: 45 minutes  More than 50% of total time spent on counseling and coordination of care as described above  Subjective:   Continues to have headache that initiates in the posterior at aspect of his scalp radiates anteriorly to right periorbital region and seems to be only ameliorated by Dilaudid  Objective:     Vitals:   Temp (24hrs), Av 1 °F (36 2 °C), Min:96 °F (35 6 °C), Max:98 °F (36 7 °C)    HR:  [67-80] 75  Resp:  [16-18] 16  BP: (139-166)/(72-83) 154/77  SpO2:  [95 %-98 %] 97 %  Body mass index is 28 22 kg/m²  Input and Output Summary (last 24 hours):        Intake/Output Summary (Last 24 hours) at 10/29/17 0834  Last data filed at 10/28/17 1601   Gross per 24 hour   Intake             1000 ml   Output                0 ml   Net             1000 ml       Physical Exam:     Physical Exam:   General appearance: alert, appears stated age and cooperative  Head: Normocephalic, without obvious abnormality, scalp lesions, Posterior scalp and slightly reddened and tender to to palpation no areas of ulceration noted  Lungs: clear to auscultation bilaterally  Heart: regular rate and rhythm  Abdomen: soft, non-tender; bowel sounds normal; no masses,  no organomegaly  Back: negative  Extremities: extremities normal, atraumatic, no cyanosis or edema  Neurologic: Grossly normal      Additional Data:     Labs:      Results from last 7 days  Lab Units 10/28/17  1357   WBC Thousand/uL 0 83*   HEMOGLOBIN g/dL 10 5*   HEMATOCRIT % 33 5*   PLATELETS Thousands/uL 46*   NEUTROS PCT % 45   LYMPHS PCT % 21   MONOS PCT % 27*   EOS PCT % 6       Results from last 7 days  Lab Units 10/28/17  1357   SODIUM mmol/L 140   POTASSIUM mmol/L 4 0   CHLORIDE mmol/L 103   CO2 mmol/L 30   BUN mg/dL 23   CREATININE mg/dL 1 03   CALCIUM mg/dL 8 8   TOTAL PROTEIN g/dL 5 9*   BILIRUBIN TOTAL mg/dL 0 84   ALK PHOS U/L 35*   ALT U/L 20   AST U/L 12   GLUCOSE RANDOM mg/dL 147*       Results from last 7 days  Lab Units 10/28/17  1357   INR  1 11       * I Have Reviewed All Lab Data Listed Above  * Additional Pertinent Lab Tests Reviewed: [address] Admission Reviewed    Imaging:  Xr Chest 2 Views    Result Date: 10/8/2017  Narrative: CHEST - DUAL ENERGY INDICATION: 27-year-old male, fever COMPARISON: 9/18/2017 chest x-ray VIEWS:  PA (including soft tissue/bone algorithms) and lateral projections; 4 images FINDINGS: The lungs are clear  No pleural effusions  The cardiomediastinal silhouette is unremarkable  Bony thorax is unremarkable  Right-sided portacatheter, typical appearance, terminating at cavoatrial junction  No pneumothorax Findings are stable     Impression: No acute cardiopulmonary disease, stable                               Findings are consistent with emergency room physician's preliminary reading  Workstation performed: [id number]     Ct Head Without Contrast    Result Date: 10/28/2017  Narrative: CT BRAIN - WITHOUT CONTRAST INDICATION:  Headache COMPARISON:  CT head 10/7/2017 TECHNIQUE:  CT examination of the brain was performed  In addition to axial images, coronal reformatted images were created and submitted for interpretation  Radiation dose length product (DLP) for this visit:  1068 mGy-cm     This examination, like all CT scans performed in the [facility], was performed utilizing techniques to minimize radiation dose exposure, including the use of iterative reconstruction and automated exposure control  IMAGE QUALITY:  Diagnostic  FINDINGS:  PARENCHYMA:  Decreased attenuation is noted in the supratentorial white matter demonstrating an appearance most consistent with mild microangiopathic change  No intracranial mass, mass effect or midline shift  No CT signs of acute infarction  There is no parenchymal hemorrhage  VENTRICLES AND EXTRA-AXIAL SPACES:  Normal for patient's age  VISUALIZED ORBITS AND PARANASAL SINUSES:  Orbits appear normal   Mild scattered sinus mucosal thickening is noted  No fluid levels are seen  CALVARIUM AND EXTRACRANIAL SOFT TISSUES:  No calvarial fracture  Stable subcutaneous soft tissue swelling along the posterior occiput near the vertex  Impression: No acute intracranial abnormality  Stable posterior soft tissue swelling unchanged from prior exam  Workstation performed: [id number]     Ct Head Without Contrast    Result Date: 10/8/2017  Narrative: CT BRAIN - WITHOUT CONTRAST INDICATION:  CLL  Pain at [place] of head with tender mass COMPARISON:  September 18, 2017 TECHNIQUE:  CT examination of the brain was performed  In addition to axial images, coronal reformatted images were created and submitted for interpretation  Radiation dose length product (DLP) for this visit:  1026 mGy-cm   This examination, like all CT scans performed in the [facility], was performed utilizing techniques to minimize radiation dose exposure, including the use of iterative reconstruction and automated exposure control  IMAGE QUALITY:  Diagnostic  FINDINGS:  PARENCHYMA: Age-related central atrophy  No intracranial mass, mass effect or midline shift  No CT signs of acute infarction  There is no parenchymal hemorrhage  VENTRICLES AND EXTRA-AXIAL SPACES:  Normal for patient's age  VISUALIZED ORBITS AND PARANASAL SINUSES:  Orbits appear normal  Moderate scattered sinus mucosal thickening is noted  Small amounts of fluid in the sphenoid sinuses   CALVARIUM AND EXTRACRANIAL SOFT TISSUES:  Soft tissue swelling posterior scalp at the vertex  Skin thickening  No focal mass or collection  This appears worse when compared to prior exam      Impression: No acute intracranial abnormality  Worsening Soft tissue swelling posterior scalp at the vertex  No focal mass or collection  Please correlate clinically for infection versus other etiologies including unlikely cutaneous CLL  Findings are consistent with the preliminary report from [facility] which was provided shortly after completion of the exam              Workstation performed: [id number]     Radiology Results    Result Date: 10/9/2017  Narrative: Ordered by an unspecified provider  Imaging Reports Reviewed Today Include:  CT of brain reviewed  Imaging Personally Reviewed by Myself Includes:  Now  Procedure: Ct Head Without Contrast    Result Date: 10/28/2017  Narrative: CT BRAIN - WITHOUT CONTRAST INDICATION:  Headache COMPARISON:  CT head 10/7/2017 TECHNIQUE:  CT examination of the brain was performed  In addition to axial images, coronal reformatted images were created and submitted for interpretation  Radiation dose length product (DLP) for this visit:  1068 mGy-cm   This examination, like all CT scans performed in the [facility], was performed utilizing techniques to minimize radiation dose exposure, including the use of iterative reconstruction and automated exposure control  IMAGE QUALITY:  Diagnostic  FINDINGS:  PARENCHYMA:  Decreased attenuation is noted in the supratentorial white matter demonstrating an appearance most consistent with mild microangiopathic change  No intracranial mass, mass effect or midline shift  No CT signs of acute infarction  There is no parenchymal hemorrhage  VENTRICLES AND EXTRA-AXIAL SPACES:  Normal for patient's age  VISUALIZED ORBITS AND PARANASAL SINUSES:  Orbits appear normal   Mild scattered sinus mucosal thickening is noted    No fluid levels are seen  CALVARIUM AND EXTRACRANIAL SOFT TISSUES:  No calvarial fracture  Stable subcutaneous soft tissue swelling along the posterior occiput near the vertex  Impression: No acute intracranial abnormality  Stable posterior soft tissue swelling unchanged from prior exam  Workstation performed: [id number]        Recent Cultures (last 7 days):           Last 24 Hours Medication List:     al mag oxide-diphenhydramine-lidocaine viscous 10 mL Oral 4x Daily (AC & HS)   aspirin 81 mg Oral Every Other Day   citalopram 40 mg Oral Daily   doxycycline hyclate 100 mg Oral Daily   folic acid 1 mg Oral Daily   fondaparinux 2 5 mg Subcutaneous Daily   gabapentin 100 mg Oral Daily   insulin glargine 10 Units Subcutaneous QAM   insulin lispro 1-6 Units Subcutaneous 4x Daily (AC & HS)   insulin lispro 10 Units Subcutaneous TID With Meals   pantoprazole 40 mg Oral BID AC   predniSONE 20 mg Oral Daily   sucralfate 1,000 mg Oral 4x Daily (AC & HS)   tbo-filgrastim 480 mcg Subcutaneous Once        Today, Patient Was Seen By: Jesenia Otero MD    ** Please Note: Dragon 360 Dictation voice to text software may have been used in the creation of this document   **

## 2017-10-29 NOTE — PHYSICIAN ADVISOR
Current patient class: Inpatient  The patient is currently on Hospital Day: 2      The patient was admitted to the hospital  on 10/28/17 at 1642 for the following diagnosis:  Cellulitis [L03 90]  Chemotherapy-induced neutropenia (HCC) [D70 1, T45 1X5A]  Intractable headache, unspecified chronicity pattern, unspecified headache type [R51]       There is documentation in the medical record of an expected length of stay of at least 2 midnights  The patient is therefore expected to satisfy the 2 midnight benchmark and given the 2 midnight presumption is appropriate for INPATIENT ADMISSION  Given this expectation of a satisfying stay, CMS instructs us that the patient is most often appropriate for inpatient admission under part A provided medical necessity is documented in the chart  After review of the relevant documentation, labs, vital signs and test results, the patient is appropriate for INPATIENT ADMISSION  Admission to the hospital as an inpatient is a complex decision making process which requires the practitioner to consider the patients presenting complaint, history and physical examination and all relevant testing  With this in mind, in this case, the patient was deemed appropriate for INPATIENT ADMISSION  After review of the documentation and testing available at the time of the admission I concur with this clinical determination of medical necessity  The patient does have an inpatient admission within the previous 30 days  The patient was admitted on 10/7/17 and discharged on 10/14/17 as an inpatient  The patient therefore required readmission review  In this case the patient should be considered a SEPARATE and UNRELATED INPATIENT ADMISSION  The patient had been discharged in stable condition with a completed care plan  There were no unresolved acute medical issues at the time of discharged which would have reasonably been expected to prompt this readmission  Rationale is as follows:     The patient is a 60 yrs   Male who presented to the ED at 10/28/2017 12:23 PM with a chief complaint of Cellulitis (Patient has had cellulitis on scalp since 10/7  Continues with pain in scalp  Currently being treated for leukemia  Patient also developed pain in arms today )     The patient presented with low-grade fever and worsened headaches  He has not had fever since hospitalized  He has an extensive history notable for chronic lymphocytic leukemia and immune hemolytic anemia  The patient has pancytopenia/leukopenia due to chemotherapy and is on neutropenic precautions and cefepime pending Infectious Disease input  He is being excluded for acute infection  He will receive filgrastim until [address]  The patient recently finished a course of doxycycline for cellulitis of the scalp  This is being continued for prophylactic measures  Based on the progress notes, it seems the patient had good therapeutic response to previous treatment for the cellulitis      The patients vitals on arrival were ED Triage Vitals [10/28/17 1222]   Temperature Pulse Respirations Blood Pressure SpO2   98 °F (36 7 °C) 80 16 151/72 97 %      Temp Source Heart Rate Source Patient Position - Orthostatic VS BP Location FiO2 (%)   Temporal Monitor Sitting Right arm --      Pain Score       9           Past Medical History:   Diagnosis Date    CAD (coronary artery disease) 2004    Cellulitis of scalp     CKD (chronic kidney disease) stage 3, GFR 30-59 ml/min     CLL (chronic lymphocytic leukemia) (HCC)     COPD (chronic obstructive pulmonary disease) (HCC)     Diabetes mellitus ([facility] [address] )     H/O blood clots     Hemolytic anemia (HCC)     History of TIA (transient ischemic attack)     Hyperlipidemia     Hypertension     Multiple gastric ulcers     Myocardial infarction     Recurrent sinusitis     Thrombocytopenia (HCC)     Vertigo      Past Surgical History:   Procedure Laterality Date    ARTHROSCOPIC REPAIR ACL      lt knee  PORTACATH PLACEMENT      IA ESOPHAGOGASTRODUODENOSCOPY TRANSORAL DIAGNOSTIC N/A 10/5/2017    Procedure: ESOPHAGOGASTRODUODENOSCOPY (EGD) with bx;  Surgeon: Quin Camacho DO;  Location: [facility];   Service: Gastroenterology    IA ESOPHAGOSCOPY FLEXIBLE TRANSORAL WITH BIOPSY N/A 9/2/2016    Procedure: ESOPHAGOGASTRODUODENOSCOPY (EGD); ESOPHAGEAL DILATION; ESOPHAGEAL BIOPSY;  Surgeon: Fay Ivory MD;  Location: [facility];  Service: Thoracic    TONSILLECTOMY      UPPER GASTROINTESTINAL ENDOSCOPY      x 6           Consults have been placed to:   IP CONSULT TO HEMATOLOGY  IP CONSULT TO INFECTIOUS DISEASES    Vitals:    10/28/17 1806 10/28/17 2245 10/29/17 0729 10/29/17 1551   BP: 139/83 140/72 154/77 146/71   Pulse: 72 67 75 87   Resp: 16 16 16 16   Temp: 97 8 °F (36 6 °C) (!) 96 7 °F (35 9 °C) (!) 96 °F (35 6 °C) 97 6 °F (36 4 °C)   TempSrc: Tympanic Tympanic Tympanic Tympanic   SpO2: 97% 98% 97% 96%   Weight:           Most recent labs:    Recent Labs      10/28/17   1357   WBC  0 83*   HGB  10 5*   HCT  33 5*   PLT  46*   K  4 0   NA  140   CALCIUM  8 8   BUN  23   CREATININE  1 03   INR  1 11   AST  12   ALT  20   ALKPHOS  35*   BILITOT  0 84       Scheduled Meds:  al mag oxide-diphenhydramine-lidocaine viscous 10 mL Oral 4x Daily (AC & HS)   aspirin 81 mg Oral Every Other Day   citalopram 40 mg Oral Daily   doxycycline hyclate 100 mg Oral Daily   folic acid 1 mg Oral Daily   fondaparinux 2 5 mg Subcutaneous Daily   gabapentin 100 mg Oral Daily   insulin glargine 10 Units Subcutaneous QAM   insulin lispro 1-6 Units Subcutaneous 4x Daily (AC & HS)   insulin lispro 10 Units Subcutaneous TID With Meals   pantoprazole 40 mg Oral BID AC   predniSONE 20 mg Oral Daily   sucralfate 1,000 mg Oral 4x Daily (AC & HS)     Continuous Infusions:   PRN Meds:   acetaminophen    benzonatate    butalbital-acetaminophen-caffeine    HYDROmorphone    LORazepam    metoclopromide    ondansetron    Surgical procedures (if appropriate):

## 2017-10-29 NOTE — PLAN OF CARE
INFECTION - ADULT     Absence of fever/infection during neutropenic period Progressing        Potential for Falls     Patient will remain free of falls Progressing

## 2017-10-29 NOTE — CONSULTS
Consultation - Infectious Disease   Maris Mckeon 60 y o  male MRN: [id number]  Unit/Bed#: E2 -01 Encounter: [id number]      IMPRESSION & RECOMMENDATIONS:   Impression/Recommendations: This is a 60 y o  male, with CLL, on chemotherapy, and chronic neutropenia, came to the ER yesterday with low-grade fever and persistent scalp pain  Patient was admitted and given 1 dose of IV cefepime empirically and restarted p o  doxycycline  He is clinically and systemically well  1   Febrile neutropenia  Patient's fever was low-grade and subjective only  He has had no documented fever here  Patient is clinically and systemically well  His ANC is 380  No obvious source of active infection  Blood cultures are negative so far  With patient clinically well and no evidence of objective fever, I will cautiously observe him off antibiotic (except for below)  No further IV cefepime is necessary  Certainly, if patient should develop fever again, we can reconsider restarting IV antibiotic  No further cefepime  Observe temperature closely off antibiotic (other than p  o  doxycycline below)  Monitor ANC  Follow-up on pending blood cultures  2  Recent scalp cellulitis  This resolved with a course of IV vancomycin, followed by p o  Keflex/doxycycline  At present, there are no obvious signs of active cellulitis  However, with persistent pain, reasons subjective low-grade fever, and neutropenia, it is reasonable to retreat patient with a course of p o  Doxycycline  Can continue p o  doxycycline  Treat x 10 day course  Serial scalp exams  3   Persistent scalp pain  Given persistent pain despite resolution of scalp inflammation, consider scalp zoster rather than cellulitis recently  Pain may now be post herpetic neuralgia  As stated above, there are minimal signs of active cellulitis at present  Patient is already on Neurontin  Consider increasing dose  Pain control per primary service    Consider increasing Neurontin dosage  4   HSV esophagitis  This appears to have resolved on reason EGD  5   CLL on chemotherapy  Patient is currently neutropenic  Hopefully, neutropenia will resolve soon  Monitor ANC  Outpatient records reviewed in detail  Discussed with the patient in detail regarding the above plan  Thank you for this consultation  We will follow along with you  HISTORY OF PRESENT ILLNESS:  Reason for Consult:  Febrile neutropenia  HPI: Maris Mckeon is a 60 y o  male, with CLL, on chemotherapy, and chronic neutropenia, came to the ER yesterday with low-grade fever and persistent scalp pain  Patient was admitted here earlier this month with scalp cellulitis  He did well with IV vancomycin and was discharged home on Keflex/doxycycline  He completed this last week  Patient states that he has persistent pain in his scalp, on top of it had with radiation down to both temporal area  He did not have fever at home until yesterday when he noted a temperature of 100 1°  No chills  He recalled his oncologist down to come to the ER with fever  Therefore, he came in for evaluation  On presentation to the ER, patient did not have fever  However, he did have neutropenia  Therefore, he was admitted  Once dose of cefepime was given  Doxycycline was restarted  We asked to evaluate the patient  Patient scalp pain remains unchanged, although better controlled with Dilaudid  His temperature remains in the normal range  Patient has no other complaints  Of note, patient had HSV esophagitis in the past, with esophageal ulcer  He had a recent EGD, without further esophageal ulceration  Patient is asymptomatic, without odynophagia  REVIEW OF SYSTEMS:  A complete 12 point system-based review of systems is otherwise negative      PAST MEDICAL HISTORY:  Past Medical History:   Diagnosis Date    CAD (coronary artery disease) 2004    Cellulitis of scalp     CKD (chronic kidney disease) stage 3, GFR 30-59 ml/min     CLL (chronic lymphocytic leukemia) (HCC)     COPD (chronic obstructive pulmonary disease) (HCC)     Diabetes mellitus ([address] )     H/O blood clots     Hemolytic anemia (HCC)     History of TIA (transient ischemic attack)     Hyperlipidemia     Hypertension     Multiple gastric ulcers     Myocardial infarction     Recurrent sinusitis     Thrombocytopenia (HCC)     Vertigo      Past Surgical History:   Procedure Laterality Date    ARTHROSCOPIC REPAIR ACL      lt knee    PORTACATH PLACEMENT      UT ESOPHAGOGASTRODUODENOSCOPY TRANSORAL DIAGNOSTIC N/A 10/5/2017    Procedure: ESOPHAGOGASTRODUODENOSCOPY (EGD) with bx;  Surgeon: Enmanuel Nguyen DO;  Location: [facility]; Service: Gastroenterology    UT ESOPHAGOSCOPY FLEXIBLE TRANSORAL WITH BIOPSY N/A 2016    Procedure: ESOPHAGOGASTRODUODENOSCOPY (EGD); ESOPHAGEAL DILATION; ESOPHAGEAL BIOPSY;  Surgeon: Jenifer Cook MD;  Location: [facility];  Service: Thoracic    TONSILLECTOMY      UPPER GASTROINTESTINAL ENDOSCOPY      x 6     Problem list reviewed  FAMILY HISTORY:  Non-contributory    SOCIAL HISTORY:  History   Alcohol Use No     History   Drug Use No     History   Smoking Status    Former Smoker   Smokeless Tobacco    Never Used     Comment: pt refuses to answer question       ALLERGIES:  Allergies   Allergen Reactions    Heparin Other (See Comments)     clot    Macrolides And Ketolides Other (See Comments)     Interacts with other meds he is taking       MEDICATIONS:  All current active medications have been reviewed  Patient is currently on p o  doxycycline  PHYSICAL EXAM:  Vitals:  HR:  [67-87] 87  Resp:  [16] 16  BP: (139-154)/(71-83) 146/71  SpO2:  [96 %-98 %] 96 %  Temp (24hrs), Av °F (36 1 °C), Min:96 °F (35 6 °C), Max:97 8 °F (36 6 °C)  Current: Temperature: 97 6 °F (36 4 °C)     Physical Exam:  General:  Well-nourished, well-developed, in no acute distress   Awake, alert and oriented x 3   Head:  Superficial ulcer on scalp superiorly and posteriorly  No drainage  Mild erythema  No warmth  Mild edema  No fluctuance  Moderate tenderness  Eyes:  Conjunctive clear with no hemorrhages or effusions  Oropharynx:  No ulcers, no lesions, pharynx benign, no tonsillitis  Neck:  Supple, no lymphadenopathy, no mass, nontender  Lungs:  Expansion symmetric, no rales, no wheezing, no accessory muscle use  Cardiac:  Regular rate and rhythm, normal S1, normal S2, no murmurs  Abdomen:  Soft, nondistended, non-tender, no HSM  Extremities:  No edema, no erythema, nontender  No ulcers  Skin:  No rashes, no ulcers  Neurological:  Moves all four extremities spontaneously, sensation grossly intact    LABS, IMAGING, & OTHER STUDIES:  Lab Results:  I have personally reviewed pertinent labs  Results from last 7 days  Lab Units 10/28/17  1357 10/23/17  0943   SODIUM mmol/L 140 142   POTASSIUM mmol/L 4 0 4 3   CHLORIDE mmol/L 103 103   CO2 mmol/L 30 29   ANION GAP mmol/L 7 10   BUN mg/dL 23 15   CREATININE mg/dL 1 03 1 00   EGFR ml/min/1 73sq m 77 80   GLUCOSE RANDOM mg/dL 147* 157*   CALCIUM mg/dL 8 8 9 4   AST U/L 12 22   ALT U/L 20 17   ALK PHOS U/L 35* 38*   TOTAL PROTEIN g/dL 5 9* 5 7*   ALBUMIN g/dL 3 8 3 7   BILIRUBIN TOTAL mg/dL 0 84 0 80       Results from last 7 days  Lab Units 10/28/17  1357 10/23/17  0943   WBC Thousand/uL 0 83* 4 30*   HEMOGLOBIN g/dL 10 5* 10 1*   PLATELETS Thousands/uL 46* 96*       Results from last 7 days  Lab Units 10/28/17  1358   INFLUENZA A PCR  None Detected   INFLUENZA B PCR  None Detected   RSV PCR  None Detected       Imaging Studies:   I have personally reviewed pertinent imaging study reports and images in PACS  Head CT reviewed personally  Stable mild posterior scalp soft tissue edema  No abscess  EKG, Pathology, and Other Studies:   I have personally reviewed pertinent reports

## 2017-10-30 ENCOUNTER — HOSPITAL ENCOUNTER (OUTPATIENT)
Dept: INFUSION CENTER | Facility: CLINIC | Age: 62
Discharge: HOME | End: 2017-10-30
Payer: MEDICARE

## 2017-10-30 PROBLEM — R50.9 FUO (FEVER OF UNKNOWN ORIGIN): Status: RESOLVED | Noted: 2017-10-08 | Resolved: 2017-10-30

## 2017-10-30 PROBLEM — R51.9 HEADACHE AROUND THE EYES: Status: ACTIVE | Noted: 2017-10-30

## 2017-10-30 LAB
ANISOCYTOSIS BLD QL SMEAR: PRESENT
BASOPHILS # BLD MANUAL: 0.01 THOUSAND/UL (ref 0–0.1)
BASOPHILS NFR MAR MANUAL: 1 % (ref 0–1)
EOSINOPHIL # BLD MANUAL: 0.06 THOUSAND/UL (ref 0–0.4)
EOSINOPHIL NFR BLD MANUAL: 4 % (ref 0–6)
ERYTHROCYTE [DISTWIDTH] IN BLOOD BY AUTOMATED COUNT: 16.4 % (ref 11.6–15.1)
GLUCOSE SERPL-MCNC: 111 MG/DL (ref 65–140)
GLUCOSE SERPL-MCNC: 183 MG/DL (ref 65–140)
GLUCOSE SERPL-MCNC: 255 MG/DL (ref 65–140)
GLUCOSE SERPL-MCNC: 94 MG/DL (ref 65–140)
HCT VFR BLD AUTO: 34.1 % (ref 36.5–49.3)
HGB BLD-MCNC: 10.6 G/DL (ref 12–17)
LYMPHOCYTES # BLD AUTO: 0.38 THOUSAND/UL (ref 0.6–4.47)
LYMPHOCYTES # BLD AUTO: 26 % (ref 14–44)
MCH RBC QN AUTO: 29.3 PG (ref 26.8–34.3)
MCHC RBC AUTO-ENTMCNC: 31.1 G/DL (ref 31.4–37.4)
MCV RBC AUTO: 94 FL (ref 82–98)
METAMYELOCYTES NFR BLD MANUAL: 1 % (ref 0–1)
MONOCYTES # BLD AUTO: 0.25 THOUSAND/UL (ref 0–1.22)
MONOCYTES NFR BLD: 17 % (ref 4–12)
NEUTROPHILS # BLD MANUAL: 0.75 THOUSAND/UL (ref 1.85–7.62)
NEUTS BAND NFR BLD MANUAL: 8 % (ref 0–8)
NEUTS SEG NFR BLD AUTO: 43 % (ref 43–75)
NRBC BLD AUTO-RTO: 0 /100 WBCS
PLATELET # BLD AUTO: 22 THOUSANDS/UL (ref 149–390)
PLATELET BLD QL SMEAR: ABNORMAL
POLYCHROMASIA BLD QL SMEAR: PRESENT
RBC # BLD AUTO: 3.62 MILLION/UL (ref 3.88–5.62)
TOTAL CELLS COUNTED SPEC: 100
WBC # BLD AUTO: 1.47 THOUSAND/UL (ref 4.31–10.16)

## 2017-10-30 PROCEDURE — 85007 BL SMEAR W/DIFF WBC COUNT: CPT | Performed by: INTERNAL MEDICINE

## 2017-10-30 PROCEDURE — 82948 REAGENT STRIP/BLOOD GLUCOSE: CPT

## 2017-10-30 PROCEDURE — 85027 COMPLETE CBC AUTOMATED: CPT | Performed by: INTERNAL MEDICINE

## 2017-10-30 RX ORDER — OXYCODONE HCL 10 MG/1
10 TABLET, FILM COATED, EXTENDED RELEASE ORAL EVERY 12 HOURS SCHEDULED
Status: DISCONTINUED | OUTPATIENT
Start: 2017-10-30 | End: 2017-11-03 | Stop reason: HOSPADM

## 2017-10-30 RX ORDER — DOCUSATE SODIUM 100 MG/1
100 CAPSULE, LIQUID FILLED ORAL 2 TIMES DAILY
Status: DISCONTINUED | OUTPATIENT
Start: 2017-10-30 | End: 2017-11-03 | Stop reason: HOSPADM

## 2017-10-30 RX ORDER — HYDROMORPHONE HYDROCHLORIDE 2 MG/1
2 TABLET ORAL EVERY 4 HOURS PRN
Status: DISCONTINUED | OUTPATIENT
Start: 2017-10-30 | End: 2017-11-03 | Stop reason: HOSPADM

## 2017-10-30 RX ORDER — OXYCODONE HYDROCHLORIDE 5 MG/1
5 TABLET ORAL EVERY 4 HOURS PRN
Status: DISCONTINUED | OUTPATIENT
Start: 2017-10-30 | End: 2017-11-03 | Stop reason: HOSPADM

## 2017-10-30 RX ORDER — HYDROMORPHONE HYDROCHLORIDE 2 MG/1
2 TABLET ORAL EVERY 4 HOURS PRN
Status: DISCONTINUED | OUTPATIENT
Start: 2017-10-30 | End: 2017-10-30

## 2017-10-30 RX ORDER — OXYCODONE HYDROCHLORIDE 10 MG/1
10 TABLET ORAL EVERY 4 HOURS PRN
Status: DISCONTINUED | OUTPATIENT
Start: 2017-10-30 | End: 2017-10-30

## 2017-10-30 RX ORDER — GABAPENTIN 100 MG/1
200 CAPSULE ORAL 3 TIMES DAILY
Status: DISCONTINUED | OUTPATIENT
Start: 2017-10-30 | End: 2017-11-01

## 2017-10-30 RX ORDER — SENNOSIDES 8.6 MG
1 TABLET ORAL
Status: DISCONTINUED | OUTPATIENT
Start: 2017-10-30 | End: 2017-10-30

## 2017-10-30 RX ADMIN — TBO-FILGRASTIM 480 MCG: 480 INJECTION, SOLUTION SUBCUTANEOUS at 19:59

## 2017-10-30 RX ADMIN — SUCRALFATE 1000 MG: 1 SUSPENSION ORAL at 06:01

## 2017-10-30 RX ADMIN — BUTALBITAL, ACETAMINOPHEN, AND CAFFEINE 1 TABLET: 50; 325; 40 TABLET ORAL at 05:49

## 2017-10-30 RX ADMIN — PANTOPRAZOLE SODIUM 40 MG: 40 TABLET, DELAYED RELEASE ORAL at 06:01

## 2017-10-30 RX ADMIN — DOCUSATE SODIUM 100 MG: 100 CAPSULE, LIQUID FILLED ORAL at 11:43

## 2017-10-30 RX ADMIN — INSULIN LISPRO 10 UNITS: 100 INJECTION, SOLUTION INTRAVENOUS; SUBCUTANEOUS at 11:46

## 2017-10-30 RX ADMIN — HYDROMORPHONE HYDROCHLORIDE 2 MG: 2 TABLET ORAL at 21:01

## 2017-10-30 RX ADMIN — LIDOCAINE HYDROCHLORIDE 10 ML: 20 SOLUTION ORAL; TOPICAL at 16:53

## 2017-10-30 RX ADMIN — INSULIN LISPRO 1 UNITS: 100 INJECTION, SOLUTION INTRAVENOUS; SUBCUTANEOUS at 21:51

## 2017-10-30 RX ADMIN — BUTALBITAL, ACETAMINOPHEN, AND CAFFEINE 1 TABLET: 50; 325; 40 TABLET ORAL at 11:52

## 2017-10-30 RX ADMIN — LIDOCAINE HYDROCHLORIDE 10 ML: 20 SOLUTION ORAL; TOPICAL at 21:01

## 2017-10-30 RX ADMIN — INSULIN LISPRO 10 UNITS: 100 INJECTION, SOLUTION INTRAVENOUS; SUBCUTANEOUS at 08:23

## 2017-10-30 RX ADMIN — INSULIN LISPRO 3 UNITS: 100 INJECTION, SOLUTION INTRAVENOUS; SUBCUTANEOUS at 16:54

## 2017-10-30 RX ADMIN — DOXYCYCLINE 100 MG: 100 CAPSULE ORAL at 08:10

## 2017-10-30 RX ADMIN — OXYCODONE HYDROCHLORIDE 5 MG: 5 TABLET ORAL at 23:57

## 2017-10-30 RX ADMIN — INSULIN GLARGINE 10 UNITS: 100 INJECTION, SOLUTION SUBCUTANEOUS at 08:11

## 2017-10-30 RX ADMIN — OXYCODONE HYDROCHLORIDE 10 MG: 10 TABLET, FILM COATED, EXTENDED RELEASE ORAL at 11:43

## 2017-10-30 RX ADMIN — SUCRALFATE 1000 MG: 1 SUSPENSION ORAL at 16:53

## 2017-10-30 RX ADMIN — LIDOCAINE HYDROCHLORIDE 10 ML: 20 SOLUTION ORAL; TOPICAL at 06:02

## 2017-10-30 RX ADMIN — HYDROMORPHONE HYDROCHLORIDE 2 MG: 2 TABLET ORAL at 16:55

## 2017-10-30 RX ADMIN — PANTOPRAZOLE SODIUM 40 MG: 40 TABLET, DELAYED RELEASE ORAL at 16:53

## 2017-10-30 RX ADMIN — HYDROMORPHONE HYDROCHLORIDE 1 MG: 2 INJECTION, SOLUTION INTRAMUSCULAR; INTRAVENOUS; SUBCUTANEOUS at 01:28

## 2017-10-30 RX ADMIN — INSULIN LISPRO 10 UNITS: 100 INJECTION, SOLUTION INTRAVENOUS; SUBCUTANEOUS at 16:55

## 2017-10-30 RX ADMIN — LIDOCAINE HYDROCHLORIDE 10 ML: 20 SOLUTION ORAL; TOPICAL at 11:44

## 2017-10-30 RX ADMIN — PREDNISONE 20 MG: 20 TABLET ORAL at 08:10

## 2017-10-30 RX ADMIN — SUCRALFATE 1000 MG: 1 SUSPENSION ORAL at 11:43

## 2017-10-30 RX ADMIN — CITALOPRAM HYDROBROMIDE 40 MG: 20 TABLET ORAL at 08:09

## 2017-10-30 RX ADMIN — SUCRALFATE 1000 MG: 1 SUSPENSION ORAL at 21:01

## 2017-10-30 RX ADMIN — OXYCODONE HYDROCHLORIDE 10 MG: 10 TABLET, FILM COATED, EXTENDED RELEASE ORAL at 21:01

## 2017-10-30 RX ADMIN — GABAPENTIN 200 MG: 100 CAPSULE ORAL at 21:01

## 2017-10-30 RX ADMIN — FOLIC ACID 1 MG: 1 TABLET ORAL at 08:10

## 2017-10-30 RX ADMIN — HYDROMORPHONE HYDROCHLORIDE 1 MG: 2 INJECTION, SOLUTION INTRAMUSCULAR; INTRAVENOUS; SUBCUTANEOUS at 08:11

## 2017-10-30 RX ADMIN — GABAPENTIN 200 MG: 100 CAPSULE ORAL at 16:53

## 2017-10-30 RX ADMIN — DOCUSATE SODIUM 100 MG: 100 CAPSULE, LIQUID FILLED ORAL at 17:14

## 2017-10-30 RX ADMIN — FONDAPARINUX SODIUM 2.5 MG: 2.5 INJECTION, SOLUTION SUBCUTANEOUS at 08:09

## 2017-10-30 RX ADMIN — GABAPENTIN 100 MG: 100 CAPSULE ORAL at 08:10

## 2017-10-30 NOTE — PROGRESS NOTES
Progress Note - Anamaria Santos 60 y o  male MRN: [id number]    Unit/Bed#: E2 -01 Encounter: [id number]        * Pancytopenia ([facility] [address] )   Assessment & Plan    · Secondary to CLL on Ibrutinib  · Continue to monitor CBC closely  · Consulted Hematology given that platelets decreased to 22 today        Headache around the eyes   Assessment & Plan    · Unclear etiology, patient did have recent scalp cellulitis that resolved however pain persists suggestive that patient may have had herpes zoster infection with now postherpetic neuralgia as suggested by ID  · Given distribution of headache to behind the eye may also have a cluster type of headache  · Will continue pain control - increased Neurontin  · Trial of O2  · If no improvement will consult Neurology for an evaluation as well        Cellulitis of head or scalp   Assessment & Plan    · Complete 10 day course of doxyciline per ID  · Will remove contact precautions as erythema has now resolved        Leukopenia due to antineoplastic chemotherapy ([facility] [address] )   Assessment & Plan    · Patient received Granex  · Continue to monitor CBC  · Given that patient has not been febrile during hospitalization will d/c contact/neutropenic precautions  · Monitor for fevers/VS        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)   Assessment & Plan    · Hx of HIT  · Continue Arixtra for anticoaguation            Pharmacologic: Fondaparinux (Arixtra)  Mechanical VTE Prophylaxis in Place: Yes    Patient Centered Rounds: I have performed bedside rounds with nursing staff today  Discussions with Specialists or Other Care Team Provider: KRISHNA Medina    Education and Discussions with Family / Patient: Patient    Time Spent for Care: 30 minutes  More than 50% of total time spent on counseling and coordination of care as described above      Current Length of Stay: 2 day(s)    Current Patient Status: Inpatient   Certification Statement: The patient will continue to require additional inpatient hospital stay due to uncontrolled pain, needs close monitoring    Discharge Plan / Estimated Discharge Date: to be determined      Code Status: Level 1 - Full Code      Subjective:   Patient seen and examined  No overnight events  The patient c/o severe headache which is not currently controlled with medications  Describes the pain on superior portion of scalp radiating to behind right eye  Otherwise reports good appetite, denies respiratory or GI symptoms  Objective:     Vitals:   Temp (24hrs), Av 6 °F (36 4 °C), Min:97 2 °F (36 2 °C), Max:98 °F (36 7 °C)    HR:  [75-87] 75  Resp:  [16-18] 18  BP: (135-146)/(71-87) 146/87  SpO2:  [96 %-97 %] 96 %  Body mass index is 28 22 kg/m²  Input and Output Summary (last 24 hours):     No intake or output data in the 24 hours ending 10/30/17 1047    Physical Exam:     Physical Exam   Constitutional: He is oriented to person, place, and time  He appears well-developed and well-nourished  HENT:   Head: Normocephalic  Mouth/Throat: Oropharynx is clear and moist    Eyes: Conjunctivae are normal  Pupils are equal, round, and reactive to light  Right eye exhibits no discharge  Neck: Normal range of motion  Cardiovascular: Normal rate and regular rhythm  Exam reveals no gallop and no friction rub  No murmur heard  Pulmonary/Chest: Effort normal  No respiratory distress  He has no wheezes  Abdominal: Soft  He exhibits no distension  There is no tenderness  Musculoskeletal: He exhibits no edema  Neurological: He is alert and oriented to person, place, and time  Skin: Skin is warm and dry  Left superior temporal region tenderness with raised pinkish papule ~3mm diameter   Psychiatric: He has a normal mood and affect         Additional Data:     Labs:      Results from last 7 days  Lab Units 10/30/17  0541 10/28/17  1357   WBC Thousand/uL 1 47* 0 83*   HEMOGLOBIN g/dL 10 6* 10 5*   HEMATOCRIT % 34 1* 33 5*   PLATELETS Thousands/uL 22* 46*   NEUTROS PCT % --  45   LYMPHS PCT %  --  21   LYMPHO PCT % 26  --    MONOS PCT %  --  27*   MONO PCT MAN % 17*  --    EOS PCT %  --  6   EOSINO PCT MANUAL % 4  --        Results from last 7 days  Lab Units 10/28/17  1357   SODIUM mmol/L 140   POTASSIUM mmol/L 4 0   CHLORIDE mmol/L 103   CO2 mmol/L 30   BUN mg/dL 23   CREATININE mg/dL 1 03   CALCIUM mg/dL 8 8   TOTAL PROTEIN g/dL 5 9*   BILIRUBIN TOTAL mg/dL 0 84   ALK PHOS U/L 35*   ALT U/L 20   AST U/L 12   GLUCOSE RANDOM mg/dL 147*       Results from last 7 days  Lab Units 10/28/17  1357   INR  1 11         Recent Cultures (last 7 days):       Results from last 7 days  Lab Units 10/28/17  1422 10/28/17  1358   BLOOD CULTURE  No Growth at 24 hrs   --    INFLUENZA A PCR   --  None Detected   INFLUENZA B PCR   --  None Detected   RSV PCR   --  None Detected       Last 24 Hours Medication List:     al mag oxide-diphenhydramine-lidocaine viscous 10 mL Oral 4x Daily (AC & HS)   aspirin 81 mg Oral Every Other Day   citalopram 40 mg Oral Daily   docusate sodium 100 mg Oral BID   doxycycline hyclate 100 mg Oral Daily   folic acid 1 mg Oral Daily   fondaparinux 2 5 mg Subcutaneous Daily   gabapentin 200 mg Oral TID   insulin glargine 10 Units Subcutaneous QAM   insulin lispro 1-6 Units Subcutaneous 4x Daily (AC & HS)   insulin lispro 10 Units Subcutaneous TID With Meals   oxyCODONE 10 mg Oral Q12H OTONIEL   pantoprazole 40 mg Oral BID AC   predniSONE 20 mg Oral Daily   sucralfate 1,000 mg Oral 4x Daily (AC & HS)        Today, Patient Was Seen By: Nate Leos MD

## 2017-10-30 NOTE — ASSESSMENT & PLAN NOTE
· Unclear etiology, patient did have recent scalp cellulitis that resolved however pain persists suggestive that patient may have had herpes zoster infection with now postherpetic neuralgia as suggested by ID  · Given distribution of headache to behind the eye may also have a cluster type of headache  · Will continue pain control - increased Neurontin  · Trial of O2  · If no improvement will consult Neurology for an evaluation as well

## 2017-10-30 NOTE — SOCIAL WORK
Patient known to CM from previous admission; patient was recently at this facility from 10/07/17-10/14/17; Patient denies any changes since last admission  Patient reports living in a three story house with spouse and family  Patient is independent with ADLs and functional mobility  Patient denies use of DME  Patient reports a hx of being active with [facility]  Patient drives; reports he will have a ride at discharge  POA identified as patient's spouse Maral Multani  PCP identified  Prescription coverage identified; patient made aware of [address] to use at discharge if needed  Patient made aware of CM's name and role  No other needs at present  CM to follow as needed

## 2017-10-30 NOTE — PLAN OF CARE
DISCHARGE PLANNING - CARE MANAGEMENT     Discharge to post-acute care or home with appropriate resources Not Progressing        INFECTION - ADULT     Absence of fever/infection during neutropenic period Not Progressing        Potential for Falls     Patient will remain free of falls Not Progressing

## 2017-10-30 NOTE — ASSESSMENT & PLAN NOTE
· Complete 10 day course of doxyciline per ID  · Will remove contact precautions as erythema has now resolved

## 2017-10-30 NOTE — PLAN OF CARE

## 2017-10-30 NOTE — ASSESSMENT & PLAN NOTE
· Patient received Granex  · Continue to monitor CBC  · Given that patient has not been febrile during hospitalization will d/c contact/neutropenic precautions  · Monitor for fevers/VS

## 2017-10-30 NOTE — PLAN OF CARE
INFECTION - ADULT     Absence of fever/infection during neutropenic period Progressing          Pt given O2 therapy for 15 min at 12L per Dr Aisha Calvo instruction for cluster headache; pt is ambulating independently

## 2017-10-30 NOTE — ASSESSMENT & PLAN NOTE
· Secondary to CLL on Ibrutinib  · Continue to monitor CBC closely  · Consulted Hematology given that platelets decreased to 22 today

## 2017-10-30 NOTE — PROGRESS NOTES
Progress Note - Infectious Disease   N[place] 60 y o  male MRN: [id number]  Unit/Bed#: E2 -01 Encounter: [id number]      Impression/Recommendations:  1  Febrile neutropenia  Patient's fever was low-grade and subjective only  He remains without fever here  Patient is clinically and systemically well  No obvious source of active infection  Blood cultures remain negative  WBC increasing  With patient clinically well and no evidence of objective fever, I will cautiously continue observe him off antibiotic (except for below)  Certainly, if patient should develop fever again, we can reconsider restarting IV antibiotic  Observe temperature closely off antibiotic (other than p  o  doxycycline below)  Monitor ANC  Follow-up on pending blood cultures      2  Recent scalp cellulitis  This resolved with a course of IV vancomycin, followed by p o  Keflex/doxycycline  At present, there are no obvious signs of active cellulitis  However, with persistent pain, reasons subjective low-grade fever, and neutropenia, it is reasonable to retreat patient with a course of p o  Doxycycline  Can continue p o  doxycycline  Treat x 10 day course  Serial scalp exams      3   Persistent scalp pain  Given persistent pain despite resolution of scalp inflammation, consider scalp zoster rather than cellulitis recently  Pain may now be post herpetic neuralgia  As stated above, there are minimal signs of active cellulitis at present  Patient is already on Neurontin  Consider increasing dose  Pain control per primary service  Consider increasing Neurontin dosage      4  HSV esophagitis  This appears to have resolved on reason EGD      5  Thrombocytopenia  This is most likely secondary to chemotherapy  Unlikely to be secondary to doxycycline  Monitor platelet count  6   CLL on chemotherapy  Patient is currently neutropenic  Hopefully, neutropenia will resolve soon    Monitor ANC      Discussed with the patient in detail regarding the above plan  Discussed with Dr Lipscomb Living from [email or web address]  Antibiotics:  Doxycycline # 2    Subjective:  Patient's scalp pain is unchanged, with radiation down to her right side of face  Temperature stays down  No further chills  He is tolerating antibiotic well  No nausea, vomiting or diarrhea  Objective:  Vitals:  HR:  [75-87] 75  Resp:  [16-18] 18  BP: (135-146)/(71-87) 146/87  SpO2:  [96 %-97 %] 96 %  Temp (24hrs), Av 6 °F (36 4 °C), Min:97 2 °F (36 2 °C), Max:98 °F (36 7 °C)  Current: Temperature: 98 °F (36 7 °C)    Physical Exam:     General: Awake, alert, cooperative, no distress  Head:    Stable diffuse scalp tenderness  No erythema/warmth  No active ulcer  No drainage  Lungs: Expansion symmetric, no rales, no wheezing, respirations unlabored  Heart[de-identified]  Regular rate and rhythm, S1 and S2 normal, no murmur  Abdomen: Soft, nondistended, non-tender, bowel sounds active all four quadrants,        no masses, no organomegaly  Extremities: No edema  No erythema/warmth  No ulcer  Nontender to palpation  Skin:  No rash  Invasive Devices     Central Venous Catheter Line            Port A Cath 10/28/17 Right Chest 1 day                Labs studies:   I have personally reviewed pertinent labs      Results from last 7 days  Lab Units 10/28/17  1357   SODIUM mmol/L 140   POTASSIUM mmol/L 4 0   CHLORIDE mmol/L 103   CO2 mmol/L 30   ANION GAP mmol/L 7   BUN mg/dL 23   CREATININE mg/dL 1 03   EGFR ml/min/1 73sq m 77   GLUCOSE RANDOM mg/dL 147*   CALCIUM mg/dL 8 8   AST U/L 12   ALT U/L 20   ALK PHOS U/L 35*   TOTAL PROTEIN g/dL 5 9*   ALBUMIN g/dL 3 8   BILIRUBIN TOTAL mg/dL 0 84       Results from last 7 days  Lab Units 10/30/17  0541 10/28/17  1357   WBC Thousand/uL 1 47* 0 83*   HEMOGLOBIN g/dL 10 6* 10 5*   PLATELETS Thousands/uL 22* 46*       Results from last 7 days  Lab Units 10/28/17  1422 10/28/17  1358   BLOOD CULTURE  No Growth at 24 hrs   --    INFLUENZA A PCR --  None Detected   INFLUENZA B PCR   --  None Detected   RSV PCR   --  None Detected       Imaging Studies:   I have personally reviewed pertinent imaging study reports and images in PACS  EKG, Pathology, and Other Studies:   I have personally reviewed pertinent reports

## 2017-10-31 LAB
ANISOCYTOSIS BLD QL SMEAR: PRESENT
BASOPHILS # BLD MANUAL: 0 THOUSAND/UL (ref 0–0.1)
BASOPHILS NFR MAR MANUAL: 0 % (ref 0–1)
EOSINOPHIL # BLD MANUAL: 0.14 THOUSAND/UL (ref 0–0.4)
EOSINOPHIL NFR BLD MANUAL: 6 % (ref 0–6)
ERYTHROCYTE [DISTWIDTH] IN BLOOD BY AUTOMATED COUNT: 16 % (ref 11.6–15.1)
GLUCOSE SERPL-MCNC: 107 MG/DL (ref 65–140)
GLUCOSE SERPL-MCNC: 136 MG/DL (ref 65–140)
GLUCOSE SERPL-MCNC: 184 MG/DL (ref 65–140)
GLUCOSE SERPL-MCNC: 200 MG/DL (ref 65–140)
GLUCOSE SERPL-MCNC: 82 MG/DL (ref 65–140)
HCT VFR BLD AUTO: 34.8 % (ref 36.5–49.3)
HGB BLD-MCNC: 10.8 G/DL (ref 12–17)
LYMPHOCYTES # BLD AUTO: 0.65 THOUSAND/UL (ref 0.6–4.47)
LYMPHOCYTES # BLD AUTO: 27 % (ref 14–44)
MCH RBC QN AUTO: 29.3 PG (ref 26.8–34.3)
MCHC RBC AUTO-ENTMCNC: 31 G/DL (ref 31.4–37.4)
MCV RBC AUTO: 94 FL (ref 82–98)
METAMYELOCYTES NFR BLD MANUAL: 1 % (ref 0–1)
MONOCYTES # BLD AUTO: 0.55 THOUSAND/UL (ref 0–1.22)
MONOCYTES NFR BLD: 23 % (ref 4–12)
NEUTROPHILS # BLD MANUAL: 1.03 THOUSAND/UL (ref 1.85–7.62)
NEUTS BAND NFR BLD MANUAL: 6 % (ref 0–8)
NEUTS SEG NFR BLD AUTO: 37 % (ref 43–75)
NRBC BLD AUTO-RTO: 1 /100 WBCS
PLATELET # BLD AUTO: 19 THOUSANDS/UL (ref 149–390)
PLATELET BLD QL SMEAR: ABNORMAL
POLYCHROMASIA BLD QL SMEAR: PRESENT
RBC # BLD AUTO: 3.69 MILLION/UL (ref 3.88–5.62)
TOTAL CELLS COUNTED SPEC: 100
WBC # BLD AUTO: 2.39 THOUSAND/UL (ref 4.31–10.16)

## 2017-10-31 PROCEDURE — 85007 BL SMEAR W/DIFF WBC COUNT: CPT | Performed by: INTERNAL MEDICINE

## 2017-10-31 PROCEDURE — P9037 PLATE PHERES LEUKOREDU IRRAD: HCPCS

## 2017-10-31 PROCEDURE — 82948 REAGENT STRIP/BLOOD GLUCOSE: CPT

## 2017-10-31 PROCEDURE — 85027 COMPLETE CBC AUTOMATED: CPT | Performed by: INTERNAL MEDICINE

## 2017-10-31 RX ORDER — SUMATRIPTAN 50 MG/1
50 TABLET, FILM COATED ORAL ONCE
Status: COMPLETED | OUTPATIENT
Start: 2017-10-31 | End: 2017-10-31

## 2017-10-31 RX ORDER — SUMATRIPTAN 50 MG/1
50 TABLET, FILM COATED ORAL ONCE AS NEEDED
Status: COMPLETED | OUTPATIENT
Start: 2017-10-31 | End: 2017-10-31

## 2017-10-31 RX ADMIN — SUCRALFATE 1000 MG: 1 SUSPENSION ORAL at 21:00

## 2017-10-31 RX ADMIN — SUCRALFATE 1000 MG: 1 SUSPENSION ORAL at 06:28

## 2017-10-31 RX ADMIN — OXYCODONE HYDROCHLORIDE 5 MG: 5 TABLET ORAL at 06:17

## 2017-10-31 RX ADMIN — PANTOPRAZOLE SODIUM 40 MG: 40 TABLET, DELAYED RELEASE ORAL at 06:28

## 2017-10-31 RX ADMIN — DOCUSATE SODIUM 100 MG: 100 CAPSULE, LIQUID FILLED ORAL at 17:56

## 2017-10-31 RX ADMIN — SUMATRIPTAN SUCCINATE 50 MG: 50 TABLET ORAL at 18:57

## 2017-10-31 RX ADMIN — SUCRALFATE 1000 MG: 1 SUSPENSION ORAL at 17:56

## 2017-10-31 RX ADMIN — SUCRALFATE 1000 MG: 1 SUSPENSION ORAL at 12:04

## 2017-10-31 RX ADMIN — PANTOPRAZOLE SODIUM 40 MG: 40 TABLET, DELAYED RELEASE ORAL at 17:55

## 2017-10-31 RX ADMIN — HYDROMORPHONE HYDROCHLORIDE 2 MG: 2 TABLET ORAL at 02:32

## 2017-10-31 RX ADMIN — INSULIN LISPRO 10 UNITS: 100 INJECTION, SOLUTION INTRAVENOUS; SUBCUTANEOUS at 12:04

## 2017-10-31 RX ADMIN — INSULIN LISPRO 10 UNITS: 100 INJECTION, SOLUTION INTRAVENOUS; SUBCUTANEOUS at 18:00

## 2017-10-31 RX ADMIN — INSULIN LISPRO 2 UNITS: 100 INJECTION, SOLUTION INTRAVENOUS; SUBCUTANEOUS at 17:59

## 2017-10-31 RX ADMIN — GABAPENTIN 200 MG: 100 CAPSULE ORAL at 17:56

## 2017-10-31 RX ADMIN — ASPIRIN 81 MG 81 MG: 81 TABLET ORAL at 08:48

## 2017-10-31 RX ADMIN — CITALOPRAM HYDROBROMIDE 40 MG: 20 TABLET ORAL at 08:49

## 2017-10-31 RX ADMIN — DOXYCYCLINE 100 MG: 100 CAPSULE ORAL at 08:48

## 2017-10-31 RX ADMIN — LIDOCAINE HYDROCHLORIDE 10 ML: 20 SOLUTION ORAL; TOPICAL at 08:50

## 2017-10-31 RX ADMIN — OXYCODONE HYDROCHLORIDE 10 MG: 10 TABLET, FILM COATED, EXTENDED RELEASE ORAL at 08:48

## 2017-10-31 RX ADMIN — DOCUSATE SODIUM 100 MG: 100 CAPSULE, LIQUID FILLED ORAL at 08:48

## 2017-10-31 RX ADMIN — INSULIN GLARGINE 10 UNITS: 100 INJECTION, SOLUTION SUBCUTANEOUS at 08:51

## 2017-10-31 RX ADMIN — FONDAPARINUX SODIUM 2.5 MG: 2.5 INJECTION, SOLUTION SUBCUTANEOUS at 12:49

## 2017-10-31 RX ADMIN — OXYCODONE HYDROCHLORIDE 10 MG: 10 TABLET, FILM COATED, EXTENDED RELEASE ORAL at 20:54

## 2017-10-31 RX ADMIN — LIDOCAINE HYDROCHLORIDE 10 ML: 20 SOLUTION ORAL; TOPICAL at 11:33

## 2017-10-31 RX ADMIN — HYDROMORPHONE HYDROCHLORIDE 2 MG: 2 TABLET ORAL at 08:46

## 2017-10-31 RX ADMIN — BUTALBITAL, ACETAMINOPHEN, AND CAFFEINE 1 TABLET: 50; 325; 40 TABLET ORAL at 17:11

## 2017-10-31 RX ADMIN — LIDOCAINE HYDROCHLORIDE 10 ML: 20 SOLUTION ORAL; TOPICAL at 17:55

## 2017-10-31 RX ADMIN — INSULIN LISPRO 10 UNITS: 100 INJECTION, SOLUTION INTRAVENOUS; SUBCUTANEOUS at 08:54

## 2017-10-31 RX ADMIN — HYDROMORPHONE HYDROCHLORIDE 2 MG: 2 TABLET ORAL at 22:14

## 2017-10-31 RX ADMIN — FOLIC ACID 1 MG: 1 TABLET ORAL at 08:47

## 2017-10-31 RX ADMIN — LIDOCAINE HYDROCHLORIDE 10 ML: 20 SOLUTION ORAL; TOPICAL at 22:13

## 2017-10-31 RX ADMIN — GABAPENTIN 200 MG: 100 CAPSULE ORAL at 20:54

## 2017-10-31 RX ADMIN — PREDNISONE 20 MG: 20 TABLET ORAL at 08:46

## 2017-10-31 RX ADMIN — SUMATRIPTAN SUCCINATE 50 MG: 50 TABLET ORAL at 11:58

## 2017-10-31 RX ADMIN — TBO-FILGRASTIM 480 MCG: 480 INJECTION, SOLUTION SUBCUTANEOUS at 12:48

## 2017-10-31 RX ADMIN — GABAPENTIN 200 MG: 100 CAPSULE ORAL at 08:47

## 2017-10-31 NOTE — ASSESSMENT & PLAN NOTE
· Unclear etiology, patient did have recent scalp cellulitis that resolved however pain persists suggestive that patient may have had herpes zoster infection with now postherpetic neuralgia as suggested by ID  · Given distribution of headache to behind the eye may also have a cluster type of headache  · Will continue pain control - increased Neurontin  · Trial of Sumatriptan  · Continue narcotics for now - switched to oral

## 2017-10-31 NOTE — PROGRESS NOTES
Progress Note - Yuridia Garcia 60 y o  male MRN: [id number]    Unit/Bed#: E2 -01 Encounter: [id number]        * Pancytopenia ([address] )   Assessment & Plan    · Secondary to CLL on Ibrutinib  · Platelets dropped to 19, will transfuse x 1 unit  · WBC count improved after Granix 1 dose yesterday, 1 more dose to be given today  · Hematology input appreciated        Headache around the eyes   Assessment & Plan    · Unclear etiology, patient did have recent scalp cellulitis that resolved however pain persists suggestive that patient may have had herpes zoster infection with now postherpetic neuralgia as suggested by ID  · Given distribution of headache to behind the eye may also have a cluster type of headache  · Will continue pain control - increased Neurontin  · Trial of Sumatriptan  · Continue narcotics for now - switched to oral        Cellulitis of head or scalp   Assessment & Plan    · Complete 10 more days of doxyciline per ID  · Will remove contact precautions as erythema has now resolved  · Continue pain management        Leukopenia due to antineoplastic chemotherapy ([address] )   Assessment & Plan    · Patient received Granex yesterday with subsequent improvement in WBC count today  · Continue to monitor CBC  · Monitor for fevers/VS        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)   Assessment & Plan    · Hx of HIT  · Continue Arixtra for anticoaguation        CKD (chronic kidney disease) stage 3, GFR 30-59 ml/min   Assessment & Plan    · Cr stable  · Continue caution with potential nephrotoxins          Pharmacologic: Fondaparinux (Arixtra)  Mechanical VTE Prophylaxis in Place: Yes    Patient Centered Rounds: I have performed bedside rounds with nursing staff today  Discussions with Specialists or Other Care Team Provider: Hem/Onc    Education and Discussions with Family / Patient: Patient    Time Spent for Care: 30 minutes    More than 50% of total time spent on counseling and coordination of care as described above  Current Length of Stay: 3 day(s)    Current Patient Status: Inpatient   Certification Statement: The patient will continue to require additional inpatient hospital stay due to thrombocytopenia requiring platelet transfusion and close monitoring    Discharge Plan / Estimated Discharge Date: to be determined      Code Status: Level 1 - Full Code      Subjective:   Patient seen and examined  No overnight events  Patient c/o severe headache now extending to entire forehead  Denies nausea or vomiting, denies history of migraines  Objective:     Vitals:   Temp (24hrs), Av 3 °F (36 3 °C), Min:96 3 °F (35 7 °C), Max:98 °F (36 7 °C)    HR:  [71-87] 86  Resp:  [16-18] 18  BP: (117-141)/(68-81) 126/81  SpO2:  [96 %-97 %] 96 %  Body mass index is 28 22 kg/m²  Input and Output Summary (last 24 hours): Intake/Output Summary (Last 24 hours) at 10/31/17 1239  Last data filed at 10/31/17 1230   Gross per 24 hour   Intake              508 ml   Output                0 ml   Net              508 ml       Physical Exam:     Physical Exam   Constitutional: He is oriented to person, place, and time  He appears well-developed and well-nourished  HENT:   Head: Normocephalic  Mouth/Throat: Oropharynx is clear and moist    Neck: Neck supple  No JVD present  Cardiovascular: Normal rate and regular rhythm  Exam reveals no gallop and no friction rub  No murmur heard  Pulmonary/Chest: Effort normal  No respiratory distress  He has no wheezes  Abdominal: Soft  He exhibits no distension  There is no tenderness  Musculoskeletal: He exhibits no edema  Neurological: He is alert and oriented to person, place, and time  Skin: Skin is warm         Additional Data:     Labs:      Results from last 7 days  Lab Units 10/31/17  0623  10/28/17  1357   WBC Thousand/uL 2 39*  < > 0 83*   HEMOGLOBIN g/dL 10 8*  < > 10 5*   HEMATOCRIT % 34 8*  < > 33 5*   PLATELETS Thousands/uL 19*  < > 46*   NEUTROS PCT % --   --  45   LYMPHS PCT %  --   --  21   LYMPHO PCT % 27  < >  --    MONOS PCT %  --   --  27*   MONO PCT MAN % 23*  < >  --    EOS PCT %  --   --  6   EOSINO PCT MANUAL % 6  < >  --    < > = values in this interval not displayed      Results from last 7 days  Lab Units 10/28/17  1357   SODIUM mmol/L 140   POTASSIUM mmol/L 4 0   CHLORIDE mmol/L 103   CO2 mmol/L 30   BUN mg/dL 23   CREATININE mg/dL 1 03   CALCIUM mg/dL 8 8   TOTAL PROTEIN g/dL 5 9*   BILIRUBIN TOTAL mg/dL 0 84   ALK PHOS U/L 35*   ALT U/L 20   AST U/L 12   GLUCOSE RANDOM mg/dL 147*       Results from last 7 days  Lab Units 10/28/17  1357   INR  1 11         Recent Cultures (last 7 days):       Results from last 7 days  Lab Units 10/28/17  1422 10/28/17  1358   BLOOD CULTURE  No Growth at 48 hrs   --    INFLUENZA A PCR   --  None Detected   INFLUENZA B PCR   --  None Detected   RSV PCR   --  None Detected       Last 24 Hours Medication List:     al mag oxide-diphenhydramine-lidocaine viscous 10 mL Oral 4x Daily (AC & HS)   aspirin 81 mg Oral Every Other Day   citalopram 40 mg Oral Daily   docusate sodium 100 mg Oral BID   doxycycline hyclate 100 mg Oral Daily   folic acid 1 mg Oral Daily   fondaparinux 2 5 mg Subcutaneous Daily   gabapentin 200 mg Oral TID   Ibrutinib 280 mg Oral Daily   insulin glargine 10 Units Subcutaneous QAM   insulin lispro 1-6 Units Subcutaneous 4x Daily (AC & HS)   insulin lispro 10 Units Subcutaneous TID With Meals   oxyCODONE 10 mg Oral Q12H OTONIEL   pantoprazole 40 mg Oral BID AC   predniSONE 20 mg Oral Daily   sucralfate 1,000 mg Oral 4x Daily (AC & HS)   tbo-filgrastim 480 mcg Subcutaneous Daily        Today, Patient Was Seen By: Guy Bishop MD

## 2017-10-31 NOTE — PROGRESS NOTES
Progress Note - Infectious Disease   Donato Singh 60 y o  male MRN: [id number]  Unit/Bed#: E2 -01 Encounter: [id number]      Impression/Recommendations:  1   Febrile neutropenia   Patient's fever was low-grade and subjective only   He remains without fever here   Patient is clinically and systemically well   No obvious source of active infection   Blood cultures remain negative   WBC increasing, with ANC now above 1000  With patient clinically well and no evidence of objective fever, will continue observe him off antibiotic (except for below)     Observe temperature closely off antibiotic (other than p  o  doxycycline below)  Monitor ANC  Follow-up on pending blood cultures      2  Recent scalp cellulitis   This resolved with a course of IV vancomycin, followed by p o  Keflex/doxycycline   At present, there are no obvious signs of active cellulitis   However, with persistent pain, subjective low-grade fever, and reason neutropenia, it is reasonable to retreat patient with a course of p o  Doxycycline  Can continue p o  doxycycline  Treat x 10 day course, now 7 days  Serial scalp exams      3   Persistent scalp pain   Given persistent pain despite resolution of scalp inflammation, consider scalp zoster rather than cellulitis recently  Irma Copeland may now be post herpetic neuralgia   As stated above, there are minimal signs of active cellulitis at present  Wanda Catalan is already on Neurontin   Consider increasing dose  Pain control per primary service  Consider increasing Neurontin dosage      4   HSV esophagitis   This appears to have resolved on reason EGD      5  Thrombocytopenia  This is most likely secondary to chemotherapy  Unlikely to be secondary to doxycycline  Platelet count continued to decrease  Monitor platelet count  Hematology/oncology evaluation pending      6  CLL on chemotherapy   Patient is currently neutropenic   Hopefully, neutropenia will resolve soon    Monitor ANC      Discussed with the patient in detail regarding the above plan  Discussed with Dr David Chung from [facility] service      Antibiotics:  Doxycycline # 3     Subjective:  Patient's scalp pain is unchanged, with radiation down to both sides of face and eye  Temperature stays down  No further chills  He is tolerating antibiotic well  No nausea, vomiting or diarrhea  Objective:  Vitals:  HR:  [71-87] 72  Resp:  [16-18] 18  BP: (131-141)/(68-79) 141/73  SpO2:  [96 %-97 %] 96 %  Temp (24hrs), Av 8 °F (36 6 °C), Min:97 4 °F (36 3 °C), Max:98 °F (36 7 °C)  Current: Temperature: 98 °F (36 7 °C)    Physical Exam:     General: Awake, alert, cooperative, no distress  Head:  Superficial scalp ulcer  No erythema/warmth  No fluctuance  Stable mild tenderness  Lungs: Expansion symmetric, no rales, no wheezing, respirations unlabored  Heart[de-identified]  Regular rate and rhythm, S1 and S2 normal, no murmur  Abdomen: Soft, nondistended, non-tender, bowel sounds active all four quadrants,        no masses, no organomegaly  Extremities: No edema  No erythema/warmth  No ulcer  Nontender to palpation  Skin:  No rash  Invasive Devices     Central Venous Catheter Line            Port A Cath 10/28/17 Right Chest 2 days                Labs studies:   I have personally reviewed pertinent labs      Results from last 7 days  Lab Units 10/28/17  1357   SODIUM mmol/L 140   POTASSIUM mmol/L 4 0   CHLORIDE mmol/L 103   CO2 mmol/L 30   ANION GAP mmol/L 7   BUN mg/dL 23   CREATININE mg/dL 1 03   EGFR ml/min/1 73sq m 77   GLUCOSE RANDOM mg/dL 147*   CALCIUM mg/dL 8 8   AST U/L 12   ALT U/L 20   ALK PHOS U/L 35*   TOTAL PROTEIN g/dL 5 9*   ALBUMIN g/dL 3 8   BILIRUBIN TOTAL mg/dL 0 84       Results from last 7 days  Lab Units 10/31/17  0623 10/30/17  0541 10/28/17  1357   WBC Thousand/uL 2 39* 1 47* 0 83*   HEMOGLOBIN g/dL 10 8* 10 6* 10 5*   PLATELETS Thousands/uL 19* 22* 46*       Results from last 7 days  Lab Units 10/28/17  1422 10/28/17  1358   BLOOD CULTURE  No Growth at 48 hrs   --    INFLUENZA A PCR   --  None Detected   INFLUENZA B PCR   --  None Detected   RSV PCR   --  None Detected       Imaging Studies:   I have personally reviewed pertinent imaging study reports and images in PACS  EKG, Pathology, and Other Studies:   I have personally reviewed pertinent reports

## 2017-10-31 NOTE — ASSESSMENT & PLAN NOTE
· Complete 10 more days of doxyciline per ID  · Will remove contact precautions as erythema has now resolved  · Continue pain management

## 2017-10-31 NOTE — ASSESSMENT & PLAN NOTE
· Patient received Granex yesterday with subsequent improvement in WBC count today  · Continue to monitor CBC  · Monitor for fevers/VS

## 2017-10-31 NOTE — ASSESSMENT & PLAN NOTE
· Secondary to CLL on Ibrutinib  · Platelets dropped to 19, will transfuse x 1 unit  · WBC count improved after Granix 1 dose yesterday, 1 more dose to be given today  · Hematology input appreciated

## 2017-11-01 DIAGNOSIS — C91.10 CHRONIC LYMPHOCYTIC LEUKEMIA OF B-CELL TYPE NOT HAVING ACHIEVED REMISSION (HCC): ICD-10-CM

## 2017-11-01 LAB
ABO GROUP BLD BPU: NORMAL
ANISOCYTOSIS BLD QL SMEAR: PRESENT
BASOPHILS # BLD MANUAL: 0 THOUSAND/UL (ref 0–0.1)
BASOPHILS NFR MAR MANUAL: 0 % (ref 0–1)
BPU ID: NORMAL
DOHLE BOD BLD QL SMEAR: PRESENT
EOSINOPHIL # BLD MANUAL: 0.07 THOUSAND/UL (ref 0–0.4)
EOSINOPHIL NFR BLD MANUAL: 2 % (ref 0–6)
ERYTHROCYTE [DISTWIDTH] IN BLOOD BY AUTOMATED COUNT: 15.6 % (ref 11.6–15.1)
GLUCOSE SERPL-MCNC: 106 MG/DL (ref 65–140)
GLUCOSE SERPL-MCNC: 161 MG/DL (ref 65–140)
GLUCOSE SERPL-MCNC: 235 MG/DL (ref 65–140)
GLUCOSE SERPL-MCNC: 84 MG/DL (ref 65–140)
HCT VFR BLD AUTO: 33.2 % (ref 36.5–49.3)
HGB BLD-MCNC: 10.1 G/DL (ref 12–17)
LYMPHOCYTES # BLD AUTO: 0.86 THOUSAND/UL (ref 0.6–4.47)
LYMPHOCYTES # BLD AUTO: 24 % (ref 14–44)
MCH RBC QN AUTO: 29.3 PG (ref 26.8–34.3)
MCHC RBC AUTO-ENTMCNC: 30.4 G/DL (ref 31.4–37.4)
MCV RBC AUTO: 96 FL (ref 82–98)
MONOCYTES # BLD AUTO: 0.43 THOUSAND/UL (ref 0–1.22)
MONOCYTES NFR BLD: 12 % (ref 4–12)
NEUTROPHILS # BLD MANUAL: 2.18 THOUSAND/UL (ref 1.85–7.62)
NEUTS BAND NFR BLD MANUAL: 7 % (ref 0–8)
NEUTS SEG NFR BLD AUTO: 54 % (ref 43–75)
NRBC BLD AUTO-RTO: 4 /100 WBC (ref 0–2)
PLATELET # BLD AUTO: 18 THOUSANDS/UL (ref 149–390)
PLATELET BLD QL SMEAR: ABNORMAL
PMV BLD AUTO: 12.7 FL (ref 8.9–12.7)
POLYCHROMASIA BLD QL SMEAR: PRESENT
RBC # BLD AUTO: 3.45 MILLION/UL (ref 3.88–5.62)
TOTAL CELLS COUNTED SPEC: 100
TOXIC GRANULES BLD QL SMEAR: PRESENT
UNIT DISPENSE STATUS: NORMAL
UNIT PRODUCT CODE: NORMAL
UNIT RH: NORMAL
VARIANT LYMPHS # BLD AUTO: 1 %
WBC # BLD AUTO: 3.58 THOUSAND/UL (ref 4.31–10.16)

## 2017-11-01 PROCEDURE — 82948 REAGENT STRIP/BLOOD GLUCOSE: CPT

## 2017-11-01 PROCEDURE — P9037 PLATE PHERES LEUKOREDU IRRAD: HCPCS

## 2017-11-01 PROCEDURE — 85027 COMPLETE CBC AUTOMATED: CPT | Performed by: INTERNAL MEDICINE

## 2017-11-01 PROCEDURE — 85007 BL SMEAR W/DIFF WBC COUNT: CPT | Performed by: INTERNAL MEDICINE

## 2017-11-01 RX ORDER — SUMATRIPTAN 50 MG/1
50 TABLET, FILM COATED ORAL 2 TIMES DAILY PRN
Status: DISCONTINUED | OUTPATIENT
Start: 2017-11-01 | End: 2017-11-03 | Stop reason: HOSPADM

## 2017-11-01 RX ORDER — UREA 10 %
500 LOTION (ML) TOPICAL
Status: DISCONTINUED | OUTPATIENT
Start: 2017-11-01 | End: 2017-11-03 | Stop reason: HOSPADM

## 2017-11-01 RX ADMIN — FONDAPARINUX SODIUM 2.5 MG: 2.5 INJECTION, SOLUTION SUBCUTANEOUS at 08:22

## 2017-11-01 RX ADMIN — OXYCODONE HYDROCHLORIDE 5 MG: 5 TABLET ORAL at 07:01

## 2017-11-01 RX ADMIN — OXYCODONE HYDROCHLORIDE 5 MG: 5 TABLET ORAL at 02:58

## 2017-11-01 RX ADMIN — INSULIN LISPRO 10 UNITS: 100 INJECTION, SOLUTION INTRAVENOUS; SUBCUTANEOUS at 16:43

## 2017-11-01 RX ADMIN — DOCUSATE SODIUM 100 MG: 100 CAPSULE, LIQUID FILLED ORAL at 18:25

## 2017-11-01 RX ADMIN — INSULIN LISPRO 1 UNITS: 100 INJECTION, SOLUTION INTRAVENOUS; SUBCUTANEOUS at 16:42

## 2017-11-01 RX ADMIN — SUCRALFATE 1000 MG: 1 SUSPENSION ORAL at 21:24

## 2017-11-01 RX ADMIN — DOXYCYCLINE 100 MG: 100 CAPSULE ORAL at 08:25

## 2017-11-01 RX ADMIN — OXYCODONE HYDROCHLORIDE 10 MG: 10 TABLET, FILM COATED, EXTENDED RELEASE ORAL at 08:24

## 2017-11-01 RX ADMIN — PANTOPRAZOLE SODIUM 40 MG: 40 TABLET, DELAYED RELEASE ORAL at 16:42

## 2017-11-01 RX ADMIN — GABAPENTIN 200 MG: 100 CAPSULE ORAL at 16:42

## 2017-11-01 RX ADMIN — Medication 500 MG: at 21:23

## 2017-11-01 RX ADMIN — LIDOCAINE HYDROCHLORIDE 10 ML: 20 SOLUTION ORAL; TOPICAL at 12:11

## 2017-11-01 RX ADMIN — PREDNISONE 20 MG: 20 TABLET ORAL at 08:25

## 2017-11-01 RX ADMIN — INSULIN LISPRO 10 UNITS: 100 INJECTION, SOLUTION INTRAVENOUS; SUBCUTANEOUS at 12:12

## 2017-11-01 RX ADMIN — ACETAMINOPHEN 650 MG: 325 TABLET ORAL at 15:41

## 2017-11-01 RX ADMIN — INSULIN GLARGINE 10 UNITS: 100 INJECTION, SOLUTION SUBCUTANEOUS at 08:22

## 2017-11-01 RX ADMIN — SUCRALFATE 1000 MG: 1 SUSPENSION ORAL at 12:12

## 2017-11-01 RX ADMIN — LIDOCAINE HYDROCHLORIDE 10 ML: 20 SOLUTION ORAL; TOPICAL at 21:24

## 2017-11-01 RX ADMIN — SUCRALFATE 1000 MG: 1 SUSPENSION ORAL at 06:31

## 2017-11-01 RX ADMIN — PANTOPRAZOLE SODIUM 40 MG: 40 TABLET, DELAYED RELEASE ORAL at 06:29

## 2017-11-01 RX ADMIN — GABAPENTIN 500 MG: 400 CAPSULE ORAL at 21:24

## 2017-11-01 RX ADMIN — LIDOCAINE HYDROCHLORIDE 10 ML: 20 SOLUTION ORAL; TOPICAL at 06:31

## 2017-11-01 RX ADMIN — FOLIC ACID 1 MG: 1 TABLET ORAL at 08:25

## 2017-11-01 RX ADMIN — LIDOCAINE HYDROCHLORIDE 10 ML: 20 SOLUTION ORAL; TOPICAL at 16:42

## 2017-11-01 RX ADMIN — BUTALBITAL, ACETAMINOPHEN, AND CAFFEINE 1 TABLET: 50; 325; 40 TABLET ORAL at 07:00

## 2017-11-01 RX ADMIN — DOCUSATE SODIUM 100 MG: 100 CAPSULE, LIQUID FILLED ORAL at 08:25

## 2017-11-01 RX ADMIN — HYDROMORPHONE HYDROCHLORIDE 2 MG: 2 TABLET ORAL at 06:29

## 2017-11-01 RX ADMIN — SUCRALFATE 1000 MG: 1 SUSPENSION ORAL at 16:42

## 2017-11-01 RX ADMIN — HYDROMORPHONE HYDROCHLORIDE 2 MG: 2 TABLET ORAL at 02:10

## 2017-11-01 RX ADMIN — INSULIN LISPRO 3 UNITS: 100 INJECTION, SOLUTION INTRAVENOUS; SUBCUTANEOUS at 21:23

## 2017-11-01 RX ADMIN — INSULIN LISPRO 10 UNITS: 100 INJECTION, SOLUTION INTRAVENOUS; SUBCUTANEOUS at 08:22

## 2017-11-01 RX ADMIN — GABAPENTIN 200 MG: 100 CAPSULE ORAL at 08:24

## 2017-11-01 RX ADMIN — OXYCODONE HYDROCHLORIDE 10 MG: 10 TABLET, FILM COATED, EXTENDED RELEASE ORAL at 21:24

## 2017-11-01 RX ADMIN — CITALOPRAM HYDROBROMIDE 40 MG: 20 TABLET ORAL at 08:25

## 2017-11-01 NOTE — PROGRESS NOTES
Progress Note - Infectious Disease   Carrington Campbell 60 y o  male MRN: [id number]  Unit/Bed#: E2 -01 Encounter: [id number]      Impression/Recommendations:  1   Febrile neutropenia   Patient's fever was low-grade and subjective only   Has been afebrile here  He remains systemically well  No obvious source of infection  Blood cultures are negative  ANC is improved and stable        -Observe temperature closely off antibiotic (other than p  o  doxycycline below)  -Monitor ANC  -Follow-up on pending blood cultures  No growth today      2  Recent scalp cellulitis   This resolved with a course of IV vancomycin, followed by p o  Keflex/doxycycline   At present, there are no obvious signs of active cellulitis   However, with persistent pain, subjective low-grade fever, and reason neutropenia, it is reasonable to retreat patient with a course of p o  Doxycycline   -Can continue p o  doxycycline   -Treat x 10 day course, another 6 days   -Serial scalp exams      3   Persistent scalp pain   Suspect post herpetic neuralgia  No active skin lesions noted  -Pain control per primary service   -Neurontin dose increased     4   HSV esophagitis   This appears to have resolved on reason EGD      5  Thrombocytopenia  This is most likely secondary to chemotherapy  Unlikely to be secondary to doxycycline  Platelet count continued to decrease    -Monitor platelet count   -Hematology/oncology evaluation pending      6   CLL on chemotherapy   Patient is currently neutropenic   Hopefully, neutropenia will resolve soon  -Monitor ANC      Discussed with the patient in detail regarding the above plan      Antibiotics:  Doxycycline # 4    Subjective:  Patient able to sleep overnight, but this morning he had some severe pain on his scalp  Pain is improved now after pain meds  No fevers or chills      Objective:  Vitals:  HR:  [71-86] 71  Resp:  [16-18] 18  BP: (117-145)/(65-81) 145/70  SpO2:  [96 %-98 %] 98 %  Temp (24hrs), Av 3 °F (36 3 °C), Min:96 3 °F (35 7 °C), Max:98 °F (36 7 °C)  Current: Temperature: 98 °F (36 7 °C)    Physical Exam:   General:  No acute distress  Psychiatric:  Awake and alert  Pulmonary:  Normal respiratory excursion without accessory muscle use  Abdomen:  Soft, nontender  Extremities:  No edema  Skin:  No rashes  Tenderness palpation of the right scalp with no skin lesions noted    Lab Results:  I have personally reviewed pertinent labs  Results from last 7 days  Lab Units 10/28/17  1357   SODIUM mmol/L 140   POTASSIUM mmol/L 4 0   CHLORIDE mmol/L 103   CO2 mmol/L 30   ANION GAP mmol/L 7   BUN mg/dL 23   CREATININE mg/dL 1 03   EGFR ml/min/1 73sq m 77   GLUCOSE RANDOM mg/dL 147*   CALCIUM mg/dL 8 8   AST U/L 12   ALT U/L 20   ALK PHOS U/L 35*   TOTAL PROTEIN g/dL 5 9*   ALBUMIN g/dL 3 8   BILIRUBIN TOTAL mg/dL 0 84       Results from last 7 days  Lab Units 17  0628 10/31/17  0623 10/30/17  0541   WBC Thousand/uL 3 58* 2 39* 1 47*   HEMOGLOBIN g/dL 10 1* 10 8* 10 6*   PLATELETS Thousands/uL 18* 19* 22*       Results from last 7 days  Lab Units 10/28/17  1422 10/28/17  1358   BLOOD CULTURE  No Growth at 72 hrs   --    INFLUENZA A PCR   --  None Detected   INFLUENZA B PCR   --  None Detected   RSV PCR   --  None Detected       Imaging Studies:   I have personally reviewed pertinent imaging study reports and images in PACS  EKG, Pathology, and Other Studies:   I have personally reviewed pertinent reports

## 2017-11-01 NOTE — PLAN OF CARE
DISCHARGE PLANNING - CARE MANAGEMENT     Discharge to post-acute care or home with appropriate resources Progressing        INFECTION - ADULT     Absence of fever/infection during neutropenic period Progressing        Potential for Falls     Patient will remain free of falls Progressing

## 2017-11-01 NOTE — ASSESSMENT & PLAN NOTE
· Secondary to CLL on Ibrutinib  · Platelets dropped to 18 after receiving 1 unit of platelets yesterday, will give 2 more units today  · WBC count improved after Granix 2 doses but patient is still neutropenic, monitor CBC  · Hematology input appreciated

## 2017-11-01 NOTE — PROGRESS NOTES
Progress Note - Tiffanie Zepeda 60 y o  male MRN: [id number]    Unit/Bed#: E2 -01 Encounter: [id number]        * Pancytopenia ([address] )   Assessment & Plan    · Secondary to CLL on Ibrutinib  · Platelets dropped to 18 after receiving 1 unit of platelets yesterday, will give 2 more units today  · WBC count improved after Granix 2 doses but patient is still neutropenic, monitor CBC  · Hematology input appreciated        Headache around the eyes   Assessment & Plan    · DD include postheraptic neuralgia vs cluster headache as pain has been relieved with Sumatriptan  · Will continue pain control with Neurontin, Tylenol, Sumatriptan and Oxycodone as last resort    · Discontinued Fioricet           Cellulitis of head or scalp   Assessment & Plan    · Complete doxyciline course  · ID input appreciated  · Continue pain management        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)   Assessment & Plan    · Hx of HIT  · Continue Arixtra for anticoaguation        CLL (chronic lymphocytic leukemia) (HCC)   Assessment & Plan    · Diagnosed approximately 10 years ago  · Continue Ibrutinib  · Monitor CBC          Pharmacologic: Fondaparinux (Arixtra)  Mechanical VTE Prophylaxis in Place: Yes    Patient Centered Rounds: I have performed bedside rounds with nursing staff today  Discussions with Specialists or Other Care Team Provider: Id, Hematology    Education and Discussions with Family / Patient: Patient    Time Spent for Care: 30 minutes  More than 50% of total time spent on counseling and coordination of care as described above  Current Length of Stay: 4 day(s)    Current Patient Status: Inpatient   Certification Statement: The patient will continue to require additional inpatient hospital stay due to thrombocytopenia requiring transfusions and close monitoring    Discharge Plan / Estimated Discharge Date: to be determined      Code Status: Level 1 - Full Code      Subjective:   Patient seen and examined   He reports mild improvement of his headache  He reports good appetite, denies GI symptoms  Denies chets pain or palpitations  States that headache was severe over night  Objective:     Vitals:   Temp (24hrs), Av 7 °F (36 5 °C), Min:96 7 °F (35 9 °C), Max:98 4 °F (36 9 °C)    HR:  [71-85] 79  Resp:  [16-20] 16  BP: (136-150)/(65-85) 150/85  SpO2:  [95 %-98 %] 97 %  Body mass index is 28 22 kg/m²  Input and Output Summary (last 24 hours): Intake/Output Summary (Last 24 hours) at 17 1706  Last data filed at 17 1433   Gross per 24 hour   Intake              230 ml   Output                0 ml   Net              230 ml       Physical Exam:     Physical Exam   Constitutional: He is oriented to person, place, and time  He appears well-developed and well-nourished  HENT:   Head: Normocephalic  Mouth/Throat: Oropharynx is clear and moist    Eyes: Conjunctivae are normal  Right eye exhibits no discharge  Neck: Normal range of motion  Neck supple  Cardiovascular: Normal rate and regular rhythm  Exam reveals no gallop and no friction rub  No murmur heard  Pulmonary/Chest: Effort normal  No respiratory distress  He has no wheezes  Abdominal: Soft  He exhibits no distension  There is no tenderness  Musculoskeletal: Normal range of motion  He exhibits no edema  Neurological: He is alert and oriented to person, place, and time  Skin: Skin is warm and dry     Healed pinkish areas scattered around scalp       Additional Data:     Labs:      Results from last 7 days  Lab Units 17  0628  10/28/17  1357   WBC Thousand/uL 3 58*  < > 0 83*   HEMOGLOBIN g/dL 10 1*  < > 10 5*   HEMATOCRIT % 33 2*  < > 33 5*   PLATELETS Thousands/uL 18*  < > 46*   NEUTROS PCT %  --   --  45   LYMPHS PCT %  --   --  21   LYMPHO PCT % 24  < >  --    MONOS PCT %  --   --  27*   MONO PCT MAN % 12  < >  --    EOS PCT %  --   --  6   EOSINO PCT MANUAL % 2  < >  --    < > = values in this interval not displayed      Results from last 7 days  Lab Units 10/28/17  1357   SODIUM mmol/L 140   POTASSIUM mmol/L 4 0   CHLORIDE mmol/L 103   CO2 mmol/L 30   BUN mg/dL 23   CREATININE mg/dL 1 03   CALCIUM mg/dL 8 8   TOTAL PROTEIN g/dL 5 9*   BILIRUBIN TOTAL mg/dL 0 84   ALK PHOS U/L 35*   ALT U/L 20   AST U/L 12   GLUCOSE RANDOM mg/dL 147*       Results from last 7 days  Lab Units 10/28/17  1357   INR  1 11         Recent Cultures (last 7 days):       Results from last 7 days  Lab Units 10/28/17  1422 10/28/17  1358   BLOOD CULTURE  No Growth at 72 hrs   --    INFLUENZA A PCR   --  None Detected   INFLUENZA B PCR   --  None Detected   RSV PCR   --  None Detected       Last 24 Hours Medication List:     al mag oxide-diphenhydramine-lidocaine viscous 10 mL Oral 4x Daily (AC & HS)   aspirin 81 mg Oral Every Other Day   citalopram 40 mg Oral Daily   docusate sodium 100 mg Oral BID   doxycycline hyclate 100 mg Oral Daily   folic acid 1 mg Oral Daily   fondaparinux 2 5 mg Subcutaneous Daily   gabapentin 500 mg Oral TID   Ibrutinib 280 mg Oral Daily   insulin glargine 10 Units Subcutaneous QAM   insulin lispro 1-6 Units Subcutaneous 4x Daily (AC & HS)   insulin lispro 10 Units Subcutaneous TID With Meals   magnesium gluconate 500 mg Oral HS   oxyCODONE 10 mg Oral Q12H OTONIEL   pantoprazole 40 mg Oral BID AC   predniSONE 20 mg Oral Daily   sucralfate 1,000 mg Oral 4x Daily (AC & HS)        Today, Patient Was Seen By: Zac Orellana MD

## 2017-11-01 NOTE — PLAN OF CARE
Absence of fever/infection during neutropenic period Progressing      Patient will remain free of falls Progressing      Afebrile past 48 hours; ambulates independently

## 2017-11-01 NOTE — CASE MANAGEMENT
Continued Stay Review    Date:    11/1/2017    Vital Signs: /70   Pulse 71   Temp 98 °F (36 7 °C) (Tympanic)   Resp 18   Wt 97 kg (213 lb 13 5 oz)   SpO2 98%   BMI 28 22 kg/m²     Medications:   Scheduled Meds:   al mag oxide-diphenhydramine-lidocaine viscous 10 mL Oral 4x Daily (AC & HS)   aspirin 81 mg Oral Every Other Day   citalopram 40 mg Oral Daily   docusate sodium 100 mg Oral BID   doxycycline hyclate 100 mg Oral Daily   folic acid 1 mg Oral Daily   fondaparinux 2 5 mg Subcutaneous Daily   gabapentin 200 mg Oral TID   Ibrutinib 280 mg Oral Daily   insulin glargine 10 Units Subcutaneous QAM   insulin lispro 1-6 Units Subcutaneous 4x Daily (AC & HS)   insulin lispro 10 Units Subcutaneous TID With Meals   oxyCODONE 10 mg Oral Q12H OTONIEL   pantoprazole 40 mg Oral BID AC   predniSONE 20 mg Oral Daily   sucralfate 1,000 mg Oral 4x Daily (AC & HS)     Continuous Infusions:    PRN Meds:   acetaminophen    benzonatate    bisacodyl    butalbital-acetaminophen-caffeine    HYDROmorphone    LORazepam    metoclopramide    ondansetron    oxyCODONE    Abnormal Labs/Diagnostic Results:    H/H   10 1/33 2  WBC    3 58  [platelets   18    Age/Sex: 60 y o  male     Assessment/Plan:      Pancytopenia (HCC)   Assessment & Plan     · Secondary to CLL on Ibrutinib  · Platelets dropped to 19, will transfuse x 1 unit  · WBC count improved after Granix 1 dose yesterday, 1 more dose to be given today  · Hematology input appreciated       Headache around the eyes   Assessment & Plan     · Unclear etiology, patient did have recent scalp cellulitis that resolved however pain persists suggestive that patient may have had herpes zoster infection with now postherpetic neuralgia as suggested by ID  · Given distribution of headache to behind the eye may also have a cluster type of headache  · Will continue pain control - increased Neurontin  · Trial of Sumatriptan  · Continue narcotics for now - switched to oral     Cellulitis of head or scalp   Assessment & Plan     · Complete 10 more days of doxyciline per ID  · Will remove contact precautions as erythema has now resolved  · Continue pain management     Leukopenia due to antineoplastic chemotherapy 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

## 2017-11-02 ENCOUNTER — APPOINTMENT (INPATIENT)
Dept: MRI IMAGING | Facility: HOSPITAL | Age: 62
DRG: 809 | End: 2017-11-02
Payer: MEDICARE

## 2017-11-02 LAB
ABO GROUP BLD BPU: NORMAL
ABO GROUP BLD BPU: NORMAL
ANISOCYTOSIS BLD QL SMEAR: PRESENT
BACTERIA BLD CULT: NORMAL
BASOPHILS # BLD MANUAL: 0.1 THOUSAND/UL (ref 0–0.1)
BASOPHILS NFR MAR MANUAL: 3 % (ref 0–1)
BPU ID: NORMAL
BPU ID: NORMAL
CRP SERPL QL: 22.2 MG/L
DACRYOCYTES BLD QL SMEAR: PRESENT
EOSINOPHIL # BLD MANUAL: 0 THOUSAND/UL (ref 0–0.4)
EOSINOPHIL NFR BLD MANUAL: 0 % (ref 0–6)
ERYTHROCYTE [DISTWIDTH] IN BLOOD BY AUTOMATED COUNT: 15.7 % (ref 11.6–15.1)
ERYTHROCYTE [SEDIMENTATION RATE] IN BLOOD: 10 MM/HOUR (ref 0–10)
GLUCOSE SERPL-MCNC: 111 MG/DL (ref 65–140)
GLUCOSE SERPL-MCNC: 156 MG/DL (ref 65–140)
GLUCOSE SERPL-MCNC: 249 MG/DL (ref 65–140)
GLUCOSE SERPL-MCNC: 99 MG/DL (ref 65–140)
HCT VFR BLD AUTO: 31.7 % (ref 36.5–49.3)
HGB BLD-MCNC: 9.7 G/DL (ref 12–17)
LYMPHOCYTES # BLD AUTO: 1.17 THOUSAND/UL (ref 0.6–4.47)
LYMPHOCYTES # BLD AUTO: 35 % (ref 14–44)
MCH RBC QN AUTO: 29 PG (ref 26.8–34.3)
MCHC RBC AUTO-ENTMCNC: 30.6 G/DL (ref 31.4–37.4)
MCV RBC AUTO: 95 FL (ref 82–98)
METAMYELOCYTES NFR BLD MANUAL: 1 % (ref 0–1)
MONOCYTES # BLD AUTO: 0.47 THOUSAND/UL (ref 0–1.22)
MONOCYTES NFR BLD: 14 % (ref 4–12)
MYELOCYTES NFR BLD MANUAL: 2 % (ref 0–1)
NEUTROPHILS # BLD MANUAL: 1.47 THOUSAND/UL (ref 1.85–7.62)
NEUTS BAND NFR BLD MANUAL: 4 % (ref 0–8)
NEUTS SEG NFR BLD AUTO: 40 % (ref 43–75)
NRBC BLD AUTO-RTO: 1 /100 WBCS
OVALOCYTES BLD QL SMEAR: PRESENT
PLATELET # BLD AUTO: 21 THOUSANDS/UL (ref 149–390)
PLATELET BLD QL SMEAR: ABNORMAL
PMV BLD AUTO: 12 FL (ref 8.9–12.7)
POLYCHROMASIA BLD QL SMEAR: PRESENT
RBC # BLD AUTO: 3.35 MILLION/UL (ref 3.88–5.62)
TOTAL CELLS COUNTED SPEC: 100
TOXIC GRANULES BLD QL SMEAR: PRESENT
UNIT DISPENSE STATUS: NORMAL
UNIT DISPENSE STATUS: NORMAL
UNIT PRODUCT CODE: NORMAL
UNIT PRODUCT CODE: NORMAL
UNIT RH: NORMAL
UNIT RH: NORMAL
VARIANT LYMPHS # BLD AUTO: 1 %
WBC # BLD AUTO: 3.33 THOUSAND/UL (ref 4.31–10.16)

## 2017-11-02 PROCEDURE — 85007 BL SMEAR W/DIFF WBC COUNT: CPT | Performed by: FAMILY MEDICINE

## 2017-11-02 PROCEDURE — 85652 RBC SED RATE AUTOMATED: CPT | Performed by: PHYSICIAN ASSISTANT

## 2017-11-02 PROCEDURE — 85027 COMPLETE CBC AUTOMATED: CPT | Performed by: FAMILY MEDICINE

## 2017-11-02 PROCEDURE — 82948 REAGENT STRIP/BLOOD GLUCOSE: CPT

## 2017-11-02 PROCEDURE — 70553 MRI BRAIN STEM W/O & W/DYE: CPT

## 2017-11-02 PROCEDURE — 86140 C-REACTIVE PROTEIN: CPT | Performed by: PHYSICIAN ASSISTANT

## 2017-11-02 RX ORDER — GABAPENTIN 300 MG/1
600 CAPSULE ORAL 3 TIMES DAILY
Status: DISCONTINUED | OUTPATIENT
Start: 2017-11-02 | End: 2017-11-03 | Stop reason: HOSPADM

## 2017-11-02 RX ADMIN — GABAPENTIN 600 MG: 300 CAPSULE ORAL at 21:31

## 2017-11-02 RX ADMIN — GABAPENTIN 500 MG: 400 CAPSULE ORAL at 16:15

## 2017-11-02 RX ADMIN — INSULIN LISPRO 1 UNITS: 100 INJECTION, SOLUTION INTRAVENOUS; SUBCUTANEOUS at 21:38

## 2017-11-02 RX ADMIN — ACETAMINOPHEN 650 MG: 325 TABLET ORAL at 08:52

## 2017-11-02 RX ADMIN — CITALOPRAM HYDROBROMIDE 40 MG: 20 TABLET ORAL at 08:50

## 2017-11-02 RX ADMIN — PREDNISONE 20 MG: 20 TABLET ORAL at 08:50

## 2017-11-02 RX ADMIN — INSULIN GLARGINE 10 UNITS: 100 INJECTION, SOLUTION SUBCUTANEOUS at 08:46

## 2017-11-02 RX ADMIN — DOXYCYCLINE 100 MG: 100 CAPSULE ORAL at 08:51

## 2017-11-02 RX ADMIN — SUCRALFATE 1000 MG: 1 SUSPENSION ORAL at 06:00

## 2017-11-02 RX ADMIN — LIDOCAINE HYDROCHLORIDE 10 ML: 20 SOLUTION ORAL; TOPICAL at 16:16

## 2017-11-02 RX ADMIN — INSULIN LISPRO 10 UNITS: 100 INJECTION, SOLUTION INTRAVENOUS; SUBCUTANEOUS at 08:46

## 2017-11-02 RX ADMIN — FOLIC ACID 1 MG: 1 TABLET ORAL at 08:52

## 2017-11-02 RX ADMIN — PANTOPRAZOLE SODIUM 40 MG: 40 TABLET, DELAYED RELEASE ORAL at 16:15

## 2017-11-02 RX ADMIN — PANTOPRAZOLE SODIUM 40 MG: 40 TABLET, DELAYED RELEASE ORAL at 06:00

## 2017-11-02 RX ADMIN — LIDOCAINE HYDROCHLORIDE 10 ML: 20 SOLUTION ORAL; TOPICAL at 06:00

## 2017-11-02 RX ADMIN — LIDOCAINE HYDROCHLORIDE 10 ML: 20 SOLUTION ORAL; TOPICAL at 21:31

## 2017-11-02 RX ADMIN — SUCRALFATE 1000 MG: 1 SUSPENSION ORAL at 12:37

## 2017-11-02 RX ADMIN — SUCRALFATE 1000 MG: 1 SUSPENSION ORAL at 21:31

## 2017-11-02 RX ADMIN — INSULIN LISPRO 10 UNITS: 100 INJECTION, SOLUTION INTRAVENOUS; SUBCUTANEOUS at 16:16

## 2017-11-02 RX ADMIN — INSULIN LISPRO 10 UNITS: 100 INJECTION, SOLUTION INTRAVENOUS; SUBCUTANEOUS at 12:37

## 2017-11-02 RX ADMIN — ASPIRIN 81 MG 81 MG: 81 TABLET ORAL at 08:54

## 2017-11-02 RX ADMIN — OXYCODONE HYDROCHLORIDE 10 MG: 10 TABLET, FILM COATED, EXTENDED RELEASE ORAL at 21:31

## 2017-11-02 RX ADMIN — FONDAPARINUX SODIUM 2.5 MG: 2.5 INJECTION, SOLUTION SUBCUTANEOUS at 08:49

## 2017-11-02 RX ADMIN — INSULIN LISPRO 3 UNITS: 100 INJECTION, SOLUTION INTRAVENOUS; SUBCUTANEOUS at 16:15

## 2017-11-02 RX ADMIN — DOCUSATE SODIUM 100 MG: 100 CAPSULE, LIQUID FILLED ORAL at 17:34

## 2017-11-02 RX ADMIN — DOCUSATE SODIUM 100 MG: 100 CAPSULE, LIQUID FILLED ORAL at 08:51

## 2017-11-02 RX ADMIN — LIDOCAINE HYDROCHLORIDE 10 ML: 20 SOLUTION ORAL; TOPICAL at 14:30

## 2017-11-02 RX ADMIN — SUCRALFATE 1000 MG: 1 SUSPENSION ORAL at 16:15

## 2017-11-02 RX ADMIN — OXYCODONE HYDROCHLORIDE 10 MG: 10 TABLET, FILM COATED, EXTENDED RELEASE ORAL at 08:52

## 2017-11-02 RX ADMIN — GABAPENTIN 500 MG: 400 CAPSULE ORAL at 08:51

## 2017-11-02 RX ADMIN — Medication 500 MG: at 21:34

## 2017-11-02 RX ADMIN — SUMATRIPTAN SUCCINATE 50 MG: 50 TABLET ORAL at 05:55

## 2017-11-02 NOTE — ASSESSMENT & PLAN NOTE
· Secondary to CLL on Ibrutinib  · Platelets 22 after additional 2 u platelets yesterday with total 3 u received  · WBC stable  · CBC in am  · Hematology input appreciated

## 2017-11-02 NOTE — PROGRESS NOTES
Progress Note - Yanna Civil 60 y o  male MRN: [id number]    Unit/Bed#: E2 -01 Encounter: [id number]        * Pancytopenia ([address] )   Assessment & Plan    · Secondary to CLL on Ibrutinib  · Platelets 22 after additional 2 u platelets yesterday with total 3 u received  · WBC stable  · CBC in am  · Hematology input appreciated        Headache around the eyes   Assessment & Plan    · DD include postheraptic neuralgia vs cluster headache vs other etiology  · Pain is not improved with increased dose of Neurontin  · Will also continue Tylenol PRN and oxycodone   · MRI brain ordered per Neurology recommendations whose input is greatly appreciated          Cellulitis of head or scalp   Assessment & Plan    · Complete doxyciline course  · ID input appreciated  · Continue pain management  · Erythema has been resolved        Leukopenia due to antineoplastic chemotherapy (HCC)   Assessment & Plan    · WBC stable, if a drop tomorrow will consider additional dose of Granix  · Continue to monitor CBC  · Monitor for fevers/VS        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)   Assessment & Plan    · Hx of HIT  · Continue Arixtra for anticoaguation        CLL (chronic lymphocytic leukemia) (HCC)   Assessment & Plan    · Diagnosed approximately 10 years ago  · Continue Ibrutinib  · Monitor CBC          Pharmacologic: Fondaparinux (Arixtra)  Mechanical VTE Prophylaxis in Place: Yes    Patient Centered Rounds: I have performed bedside rounds with nursing staff today  Discussions with Specialists or Other Care Team Provider: Neurology, Infectious Disease    Education and Discussions with Family / Patient: Patient    Time Spent for Care: 30 minutes  More than 50% of total time spent on counseling and coordination of care as described above      Current Length of Stay: 5 day(s)    Current Patient Status: Inpatient   Certification Statement: The patient will continue to require additional inpatient hospital stay due to persistent headache requiring further diagnostic workup    Discharge Plan / Estimated Discharge Date: 1 day      Code Status: Level 1 - Full Code      Subjective:   Patient seen and examined  He reports improvement of his headache which was still prominent this morning again extending across his forehead and behind his right eye  Otherwise no complaints and no o/n events  Objective:     Vitals:   Temp (24hrs), Av 7 °F (36 5 °C), Min:97 4 °F (36 3 °C), Max:98 °F (36 7 °C)    HR:  [71-75] 72  Resp:  [18] 18  BP: (126-148)/(74-92) 126/78  SpO2:  [96 %-97 %] 97 %  Body mass index is 28 22 kg/m²  Input and Output Summary (last 24 hours): Intake/Output Summary (Last 24 hours) at 17 1714  Last data filed at 17 0800   Gross per 24 hour   Intake              480 ml   Output                0 ml   Net              480 ml       Physical Exam:     Physical Exam   Constitutional: He is oriented to person, place, and time  He appears well-developed and well-nourished  HENT:   Head: Normocephalic  Mouth/Throat: Oropharynx is clear and moist    Eyes: Conjunctivae are normal  Right eye exhibits no discharge  Neck: Normal range of motion  Neck supple  Cardiovascular: Normal rate and regular rhythm  Exam reveals no gallop and no friction rub  No murmur heard  Pulmonary/Chest: Effort normal  No respiratory distress  He has no wheezes  Abdominal: Soft  He exhibits no distension  There is no tenderness  Musculoskeletal: Normal range of motion  He exhibits no edema  Neurological: He is alert and oriented to person, place, and time  No cranial nerve deficit  Skin: Skin is warm and dry         Additional Data:     Labs:      Results from last 7 days  Lab Units 17  0550  10/28/17  1357   WBC Thousand/uL 3 33*  < > 0 83*   HEMOGLOBIN g/dL 9 7*  < > 10 5*   HEMATOCRIT % 31 7*  < > 33 5*   PLATELETS Thousands/uL 21*  < > 46*   NEUTROS PCT %  --   --  45   LYMPHS PCT %  --   --  21   LYMPHO PCT % 35  < >  --    MONOS PCT %  --   --  27*   MONO PCT MAN % 14*  < >  --    EOS PCT %  --   --  6   EOSINO PCT MANUAL % 0  < >  --    < > = values in this interval not displayed  Results from last 7 days  Lab Units 10/28/17  1357   SODIUM mmol/L 140   POTASSIUM mmol/L 4 0   CHLORIDE mmol/L 103   CO2 mmol/L 30   BUN mg/dL 23   CREATININE mg/dL 1 03   CALCIUM mg/dL 8 8   TOTAL PROTEIN g/dL 5 9*   BILIRUBIN TOTAL mg/dL 0 84   ALK PHOS U/L 35*   ALT U/L 20   AST U/L 12   GLUCOSE RANDOM mg/dL 147*       Results from last 7 days  Lab Units 10/28/17  1357   INR  1 11         Recent Cultures (last 7 days):       Results from last 7 days  Lab Units 10/28/17  1422 10/28/17  1358   BLOOD CULTURE  No Growth After 4 Days    --    INFLUENZA A PCR   --  None Detected   INFLUENZA B PCR   --  None Detected   RSV PCR   --  None Detected       Last 24 Hours Medication List:     al mag oxide-diphenhydramine-lidocaine viscous 10 mL Oral 4x Daily (AC & HS)   aspirin 81 mg Oral Every Other Day   citalopram 40 mg Oral Daily   docusate sodium 100 mg Oral BID   doxycycline hyclate 100 mg Oral Daily   folic acid 1 mg Oral Daily   fondaparinux 2 5 mg Subcutaneous Daily   gabapentin 600 mg Oral TID   Ibrutinib 280 mg Oral Daily   insulin glargine 10 Units Subcutaneous QAM   insulin lispro 1-6 Units Subcutaneous 4x Daily (AC & HS)   insulin lispro 10 Units Subcutaneous TID With Meals   magnesium gluconate 500 mg Oral HS   oxyCODONE 10 mg Oral Q12H OTONIEL   pantoprazole 40 mg Oral BID AC   predniSONE 20 mg Oral Daily   sucralfate 1,000 mg Oral 4x Daily (AC & HS)        Today, Patient Was Seen By: Bony Chowdary MD

## 2017-11-02 NOTE — PROGRESS NOTES
Progress Note - Infectious Disease   Maral Kenyon 60 y o  male MRN: [id number]  Unit/Bed#: E2 -01 Encounter: [id number]      Impression/Recommendations:  1   Febrile neutropenia   Patient's fever was low-grade and subjective only   Has been afebrile here  He remains systemically well  No obvious source of infection  Blood cultures are negative  ANC is improved and stable        -Observe temperature closely off antibiotic (other than p  o  doxycycline below)  -Monitor ANC  -Follow-up on pending blood cultures- no growth to date      2  Recent scalp cellulitis   This resolved with a course of IV vancomycin, followed by p o  Keflex/doxycycline   At present, there are no obvious signs of active cellulitis   However, with persistent pain, subjective low-grade fever, and reason neutropenia, it is reasonable to retreat patient with a course of p o  Doxycycline   -Can continue p o  doxycycline   -Treat x 10 day course, another 5 days   -Serial scalp exams      3   Persistent scalp pain   Suspect post herpetic neuralgia  Neurology evaluation noted  There could also be a component of cluster headache  Pain much improved  No active skin lesions noted        -Pain control per primary service   -Neurontin dose increased     4   HSV esophagitis   This appears to have resolved on reason EGD      5  Thrombocytopenia  This is most likely secondary to chemotherapy  Unlikely to be secondary to doxycycline   Platelet count continued to decrease               -Monitor platelet count   -Hematology/oncology evaluation pending      6   CLL on chemotherapy   Patient is currently neutropenic   Hopefully, neutropenia will resolve soon  -Monitor ANC      Discussed with the patient in detail regarding the above plan  Okay to be discharged from ID standpoint      Antibiotics:  Doxycycline # 5       Subjective:  Patient feels much better today  Denies headaches  Had a headache earlier which was alleviated by Tylenol    No fevers or chills  No vision changes  Tolerating oral diet  He wants to go home today  Objective:  Vitals:  HR:  [71-85] 72  Resp:  [16-20] 18  BP: (136-150)/(74-92) 148/92  SpO2:  [95 %-97 %] 97 %  Temp (24hrs), Av 6 °F (36 4 °C), Min:96 7 °F (35 9 °C), Max:98 4 °F (36 9 °C)  Current: Temperature: 97 9 °F (36 6 °C)    Physical Exam:   General:  No acute distress  Psychiatric:  Awake and alert  Pulmonary:  Normal respiratory excursion without accessory muscle use  Abdomen:  Soft, nontender  Extremities:  No edema  Skin:  No rashes or lesions    Lab Results:  I have personally reviewed pertinent labs  Results from last 7 days  Lab Units 10/28/17  1357   SODIUM mmol/L 140   POTASSIUM mmol/L 4 0   CHLORIDE mmol/L 103   CO2 mmol/L 30   ANION GAP mmol/L 7   BUN mg/dL 23   CREATININE mg/dL 1 03   EGFR ml/min/1 73sq m 77   GLUCOSE RANDOM mg/dL 147*   CALCIUM mg/dL 8 8   AST U/L 12   ALT U/L 20   ALK PHOS U/L 35*   TOTAL PROTEIN g/dL 5 9*   ALBUMIN g/dL 3 8   BILIRUBIN TOTAL mg/dL 0 84       Results from last 7 days  Lab Units 17  0550 17  0628 10/31/17  0623   WBC Thousand/uL 3 33* 3 58* 2 39*   HEMOGLOBIN g/dL 9 7* 10 1* 10 8*   PLATELETS Thousands/uL 21* 18* 19*       Results from last 7 days  Lab Units 10/28/17  1422 10/28/17  1358   BLOOD CULTURE  No Growth After 4 Days  --    INFLUENZA A PCR   --  None Detected   INFLUENZA B PCR   --  None Detected   RSV PCR   --  None Detected       Imaging Studies:   I have personally reviewed pertinent imaging study reports and images in PACS  EKG, Pathology, and Other Studies:   I have personally reviewed pertinent reports  Progress Note - Infectious Disease   Femi Sidhu 60 y o  male MRN: [id number]  Unit/Bed#: E2 -01 Encounter: [id number]      Impression/Recommendations:  1   Febrile neutropenia   Patient's fever was low-grade and subjective only   Has been afebrile here  He remains systemically well    No obvious source of infection  Blood cultures are negative  ANC is improved and stable        -Observe temperature closely off antibiotic (other than p  o  doxycycline below)  -Monitor ANC  -Follow-up on pending blood cultures- no growth to date      2  Recent scalp cellulitis   This resolved with a course of IV vancomycin, followed by p o  Keflex/doxycycline   At present, there are no obvious signs of active cellulitis   However, with persistent pain, subjective low-grade fever, and reason neutropenia, it is reasonable to retreat patient with a course of p o  Doxycycline   -Can continue p o  doxycycline   -Treat x 10 day course, another 5 days   -Serial scalp exams      3   Persistent scalp pain   Suspect post herpetic neuralgia  Neurology evaluation noted  There could also be a component of cluster headache  Pain much improved  No active skin lesions noted        -Pain control per primary service   -Neurontin dose increased     4   HSV esophagitis   This appears to have resolved on reason EGD      5  Thrombocytopenia  This is most likely secondary to chemotherapy  Unlikely to be secondary to doxycycline   Platelet count continued to decrease               -Monitor platelet count   -Hematology/oncology evaluation pending      6   CLL on chemotherapy   Patient is currently neutropenic   Hopefully, neutropenia will resolve soon  -Monitor ANC      Discussed with the patient in detail regarding the above plan  Okay to be discharged from ID standpoint      Antibiotics:  Doxycycline # 5    Subjective:  Patient seen on AM rounds  Denies fevers, chills, or sweats  Denies nausea, vomiting, or diarrhea        Objective:  Vitals:  HR:  [71-85] 72  Resp:  [16-20] 18  BP: (136-150)/(74-92) 148/92  SpO2:  [95 %-97 %] 97 %  Temp (24hrs), Av 6 °F (36 4 °C), Min:96 7 °F (35 9 °C), Max:98 4 °F (36 9 °C)  Current: Temperature: 97 9 °F (36 6 °C)    Physical Exam:   General:  No acute distress  Psychiatric:  Awake and alert  Pulmonary: Normal respiratory excursion without accessory muscle use  Abdomen:  Soft, nontender  Extremities:  No edema  Skin:  No rashes    Lab Results:  I have personally reviewed pertinent labs  Results from last 7 days  Lab Units 10/28/17  1357   SODIUM mmol/L 140   POTASSIUM mmol/L 4 0   CHLORIDE mmol/L 103   CO2 mmol/L 30   ANION GAP mmol/L 7   BUN mg/dL 23   CREATININE mg/dL 1 03   EGFR ml/min/1 73sq m 77   GLUCOSE RANDOM mg/dL 147*   CALCIUM mg/dL 8 8   AST U/L 12   ALT U/L 20   ALK PHOS U/L 35*   TOTAL PROTEIN g/dL 5 9*   ALBUMIN g/dL 3 8   BILIRUBIN TOTAL mg/dL 0 84       Results from last 7 days  Lab Units 11/02/17  0550 11/01/17  0628 10/31/17  0623   WBC Thousand/uL 3 33* 3 58* 2 39*   HEMOGLOBIN g/dL 9 7* 10 1* 10 8*   PLATELETS Thousands/uL 21* 18* 19*       Results from last 7 days  Lab Units 10/28/17  1422 10/28/17  1358   BLOOD CULTURE  No Growth After 4 Days  --    INFLUENZA A PCR   --  None Detected   INFLUENZA B PCR   --  None Detected   RSV PCR   --  None Detected       Imaging Studies:   I have personally reviewed pertinent imaging study reports and images in PACS  EKG, Pathology, and Other Studies:   I have personally reviewed pertinent reports

## 2017-11-02 NOTE — ASSESSMENT & PLAN NOTE
· Complete doxyciline course  · ID input appreciated  · Continue pain management  · Erythema has been resolved

## 2017-11-02 NOTE — ASSESSMENT & PLAN NOTE
· DD include postheraptic neuralgia vs cluster headache vs other etiology  · Pain is not improved with increased dose of Neurontin  · Will also continue Tylenol PRN and oxycodone   · MRI brain ordered per Neurology recommendations whose input is greatly appreciated

## 2017-11-02 NOTE — ASSESSMENT & PLAN NOTE
· WBC stable, if a drop tomorrow will consider additional dose of Granix  · Continue to monitor CBC  · Monitor for fevers/VS

## 2017-11-03 VITALS
HEART RATE: 76 BPM | DIASTOLIC BLOOD PRESSURE: 92 MMHG | WEIGHT: 213.85 LBS | SYSTOLIC BLOOD PRESSURE: 154 MMHG | OXYGEN SATURATION: 99 % | RESPIRATION RATE: 18 BRPM | TEMPERATURE: 97.9 F | BODY MASS INDEX: 28.22 KG/M2

## 2017-11-03 LAB
BASOPHILS # BLD AUTO: 0.07 THOUSANDS/ΜL (ref 0–0.1)
BASOPHILS NFR BLD AUTO: 2 % (ref 0–1)
EOSINOPHIL # BLD AUTO: 0.09 THOUSAND/ΜL (ref 0–0.61)
EOSINOPHIL NFR BLD AUTO: 3 % (ref 0–6)
ERYTHROCYTE [DISTWIDTH] IN BLOOD BY AUTOMATED COUNT: 15.8 % (ref 11.6–15.1)
GLUCOSE SERPL-MCNC: 106 MG/DL (ref 65–140)
GLUCOSE SERPL-MCNC: 91 MG/DL (ref 65–140)
HCT VFR BLD AUTO: 32.4 % (ref 36.5–49.3)
HGB BLD-MCNC: 10 G/DL (ref 12–17)
LYMPHOCYTES # BLD AUTO: 0.53 THOUSANDS/ΜL (ref 0.6–4.47)
LYMPHOCYTES NFR BLD AUTO: 16 % (ref 14–44)
MCH RBC QN AUTO: 29.2 PG (ref 26.8–34.3)
MCHC RBC AUTO-ENTMCNC: 30.9 G/DL (ref 31.4–37.4)
MCV RBC AUTO: 95 FL (ref 82–98)
MONOCYTES # BLD AUTO: 0.44 THOUSAND/ΜL (ref 0.17–1.22)
MONOCYTES NFR BLD AUTO: 13 % (ref 4–12)
NEUTROPHILS # BLD AUTO: 2.26 THOUSANDS/ΜL (ref 1.85–7.62)
NEUTS SEG NFR BLD AUTO: 66 % (ref 43–75)
NRBC BLD AUTO-RTO: 1 /100 WBCS
PLATELET # BLD AUTO: 29 THOUSANDS/UL (ref 149–390)
RBC # BLD AUTO: 3.43 MILLION/UL (ref 3.88–5.62)
WBC # BLD AUTO: 3.39 THOUSAND/UL (ref 4.31–10.16)

## 2017-11-03 PROCEDURE — 85025 COMPLETE CBC W/AUTO DIFF WBC: CPT | Performed by: FAMILY MEDICINE

## 2017-11-03 PROCEDURE — A9585 GADOBUTROL INJECTION: HCPCS | Performed by: FAMILY MEDICINE

## 2017-11-03 PROCEDURE — 82948 REAGENT STRIP/BLOOD GLUCOSE: CPT

## 2017-11-03 RX ORDER — DOXYCYCLINE HYCLATE 100 MG/1
100 CAPSULE ORAL DAILY
Qty: 10 CAPSULE | Refills: 0 | Status: SHIPPED | OUTPATIENT
Start: 2017-11-04 | End: 2017-11-09

## 2017-11-03 RX ORDER — GABAPENTIN 300 MG/1
600 CAPSULE ORAL 3 TIMES DAILY
Qty: 90 CAPSULE | Refills: 0 | Status: ON HOLD | OUTPATIENT
Start: 2017-11-03 | End: 2018-04-27 | Stop reason: CLARIF

## 2017-11-03 RX ORDER — ACETAMINOPHEN 325 MG/1
650 TABLET ORAL EVERY 6 HOURS PRN
Qty: 60 TABLET | Refills: 0 | Status: SHIPPED | OUTPATIENT
Start: 2017-11-03 | End: 2018-05-09

## 2017-11-03 RX ORDER — HYDROMORPHONE HYDROCHLORIDE 2 MG/1
2 TABLET ORAL EVERY 12 HOURS PRN
Qty: 14 TABLET | Refills: 0 | Status: SHIPPED | OUTPATIENT
Start: 2017-11-03 | End: 2017-11-10

## 2017-11-03 RX ORDER — CAPSAICIN 0.07 G/100G
CREAM TOPICAL 3 TIMES DAILY
Qty: 28.3 G | Refills: 0 | Status: SHIPPED | OUTPATIENT
Start: 2017-11-03 | End: 2017-12-19 | Stop reason: ALTCHOICE

## 2017-11-03 RX ADMIN — PANTOPRAZOLE SODIUM 40 MG: 40 TABLET, DELAYED RELEASE ORAL at 06:11

## 2017-11-03 RX ADMIN — GADOBUTROL 9 ML: 604.72 INJECTION INTRAVENOUS at 00:13

## 2017-11-03 RX ADMIN — GABAPENTIN 600 MG: 300 CAPSULE ORAL at 09:17

## 2017-11-03 RX ADMIN — INSULIN LISPRO 10 UNITS: 100 INJECTION, SOLUTION INTRAVENOUS; SUBCUTANEOUS at 09:19

## 2017-11-03 RX ADMIN — PREDNISONE 20 MG: 20 TABLET ORAL at 09:16

## 2017-11-03 RX ADMIN — CITALOPRAM HYDROBROMIDE 40 MG: 20 TABLET ORAL at 09:17

## 2017-11-03 RX ADMIN — HYDROMORPHONE HYDROCHLORIDE 2 MG: 2 TABLET ORAL at 09:30

## 2017-11-03 RX ADMIN — DOXYCYCLINE 100 MG: 100 CAPSULE ORAL at 09:16

## 2017-11-03 RX ADMIN — FOLIC ACID 1 MG: 1 TABLET ORAL at 09:17

## 2017-11-03 RX ADMIN — SUCRALFATE 1000 MG: 1 SUSPENSION ORAL at 06:11

## 2017-11-03 RX ADMIN — DOCUSATE SODIUM 100 MG: 100 CAPSULE, LIQUID FILLED ORAL at 09:16

## 2017-11-03 RX ADMIN — FONDAPARINUX SODIUM 2.5 MG: 2.5 INJECTION, SOLUTION SUBCUTANEOUS at 09:20

## 2017-11-03 RX ADMIN — OXYCODONE HYDROCHLORIDE 5 MG: 5 TABLET ORAL at 06:15

## 2017-11-03 RX ADMIN — INSULIN GLARGINE 10 UNITS: 100 INJECTION, SOLUTION SUBCUTANEOUS at 09:17

## 2017-11-03 RX ADMIN — LIDOCAINE HYDROCHLORIDE 10 ML: 20 SOLUTION ORAL; TOPICAL at 06:11

## 2017-11-03 NOTE — DISCHARGE INSTR - AVS FIRST PAGE
Please obtain blood work on Monday November 6, 2017  Please follow up with your primary care provider and your oncologist in 1 week  Please fill new prescriptions at your pharmacy

## 2017-11-03 NOTE — PROGRESS NOTES
Progress Note - Infectious Disease   German Monte 60 y o  male MRN: [id number]  Unit/Bed#: E2 -01 Encounter: [id number]      Impression/Recommendations:  1   Febrile neutropenia   Patient's fever was low-grade and subjective only   Has been afebrile here  [facility] Blood remains systemically well   No obvious source of infection   Blood cultures are negative  [facility] is improved and stable        -Observe temperature closely off antibiotic (other than p  o  doxycycline below)  -Monitor ANC  -Follow-up on pending blood cultures- no growth to date      2  Recent scalp cellulitis   This resolved with a course of IV vancomycin, followed by p o  Keflex/doxycycline   At present, there are no obvious signs of active cellulitis   However, with persistent pain, subjective low-grade fever, and reason neutropenia, it is reasonable to retreat patient with a course of p o  Doxycycline    -Can continue p o  doxycycline   -Treat x 10 day course, another 4 days   -Serial scalp exams      3   Persistent scalp pain   Suspect post herpetic neuralgia  Neurology evaluation noted  There could also be a component of cluster headache  Pain much improved    No active skin lesions noted    MRI brain done yesterday was negative      -Pain control per primary service   -Neurontin dose increased     4   HSV esophagitis   This appears to have resolved on reason EGD      5  Thrombocytopenia  This is most likely secondary to chemotherapy  Unlikely to be secondary to doxycycline   Platelet count continued to decrease               -Monitor platelet count   -Hematology/oncology evaluation pending      6   CLL on chemotherapy   Patient is currently neutropenic   Hopefully, neutropenia will resolve soon  -Monitor ANC      Discussed with the patient in detail regarding the above plan  Okay to be discharged from ID standpoint      Antibiotics:  Doxycycline # 6       Subjective:  Pain at a 3/10  No nausea, vomiting, diarrhea    No fevers or chills  Objective:  Vitals:  HR:  [67-76] 76  Resp:  [18] 18  BP: (126-154)/(78-92) 154/92  SpO2:  [97 %-99 %] 99 %  Temp (24hrs), Av 7 °F (36 5 °C), Min:97 6 °F (36 4 °C), Max:97 9 °F (36 6 °C)  Current: Temperature: 97 9 °F (36 6 °C)    Physical Exam:   General:  No acute distress  Psychiatric:  Awake and alert  Pulmonary:  Normal respiratory excursion without accessory muscle use  Abdomen:  Soft, nontender  Extremities:  No edema  Skin:  No rashes    Lab Results:  I have personally reviewed pertinent labs  Results from last 7 days  Lab Units 10/28/17  1357   SODIUM mmol/L 140   POTASSIUM mmol/L 4 0   CHLORIDE mmol/L 103   CO2 mmol/L 30   ANION GAP mmol/L 7   BUN mg/dL 23   CREATININE mg/dL 1 03   EGFR ml/min/1 73sq m 77   GLUCOSE RANDOM mg/dL 147*   CALCIUM mg/dL 8 8   AST U/L 12   ALT U/L 20   ALK PHOS U/L 35*   TOTAL PROTEIN g/dL 5 9*   ALBUMIN g/dL 3 8   BILIRUBIN TOTAL mg/dL 0 84       Results from last 7 days  Lab Units 17  0606 17  0550 17  0628   WBC Thousand/uL 3 39* 3 33* 3 58*   HEMOGLOBIN g/dL 10 0* 9 7* 10 1*   PLATELETS Thousands/uL 29* 21* 18*       Results from last 7 days  Lab Units 10/28/17  1422 10/28/17  1358   BLOOD CULTURE  No Growth After 5 Days  --    INFLUENZA A PCR   --  None Detected   INFLUENZA B PCR   --  None Detected   RSV PCR   --  None Detected       Imaging Studies:   I have personally reviewed pertinent imaging study reports and images in PACS  EKG, Pathology, and Other Studies:   I have personally reviewed pertinent reports

## 2017-11-03 NOTE — PROGRESS NOTES
PATIENT NAME: Antoniette Sever,  YOB: 1955  MEDICAL RECORD NUMBER: [id number]  Neurology Follow up Note     Following patient for: headache     Overnight Events: None    Subjective: Patient feels well today  Intermittent mild headache  12 point ROS negative for any changes  Scheduled Meds:  al mag oxide-diphenhydramine-lidocaine viscous 10 mL Oral 4x Daily (AC & HS)   aspirin 81 mg Oral Every Other Day   citalopram 40 mg Oral Daily   docusate sodium 100 mg Oral BID   doxycycline hyclate 100 mg Oral Daily   folic acid 1 mg Oral Daily   fondaparinux 2 5 mg Subcutaneous Daily   gabapentin 600 mg Oral TID   Ibrutinib 280 mg Oral Daily   insulin glargine 10 Units Subcutaneous QAM   insulin lispro 1-6 Units Subcutaneous 4x Daily (AC & HS)   insulin lispro 10 Units Subcutaneous TID With Meals   magnesium gluconate 500 mg Oral HS   oxyCODONE 10 mg Oral Q12H OTONIEL   pantoprazole 40 mg Oral BID AC   predniSONE 20 mg Oral Daily   sucralfate 1,000 mg Oral 4x Daily (AC & HS)     Continuous Infusions:   PRN Meds:   acetaminophen    benzonatate    bisacodyl    HYDROmorphone    LORazepam    metoclopramide    ondansetron    oxyCODONE    SUMAtriptan      Objective  /92   Pulse 76   Temp 97 9 °F (36 6 °C) (Tympanic)   Resp 18   Wt 97 kg (213 lb 13 5 oz)   SpO2 99%   BMI 28 22 kg/m²   GEN: Comfortable, NAD  HEENT: NC/AT  Anicertic  A/O x 3, following commands  CN 2-12 intact  Gait unremarkable, full strength      Recent Results (from the past 24 hour(s))   Fingerstick Glucose (POCT)    Collection Time: 11/02/17 11:15 AM   Result Value Ref Range    POC Glucose 111 65 - 140 mg/dl   Fingerstick Glucose (POCT)    Collection Time: 11/02/17  4:14 PM   Result Value Ref Range    POC Glucose 249 (H) 65 - 140 mg/dl   Fingerstick Glucose (POCT)    Collection Time: 11/02/17  9:33 PM   Result Value Ref Range    POC Glucose 156 (H) 65 - 140 mg/dl   CBC and differential    Collection Time: 11/03/17  6:06 AM   Result Value Ref Range    WBC 3 39 (L) 4 31 - 10 16 Thousand/uL    RBC 3 43 (L) 3 88 - 5 62 Million/uL    Hemoglobin 10 0 (L) 12 0 - 17 0 g/dL    Hematocrit 32 4 (L) 36 5 - 49 3 %    MCV 95 82 - 98 fL    MCH 29 2 26 8 - 34 3 pg    MCHC 30 9 (L) 31 4 - 37 4 g/dL    RDW 15 8 (H) 11 6 - 15 1 %    Platelets 29 (LL) 231 - 390 Thousands/uL    nRBC 1 /100 WBCs    Neutrophils Relative 66 43 - 75 %    Lymphocytes Relative 16 14 - 44 %    Monocytes Relative 13 (H) 4 - 12 %    Eosinophils Relative 3 0 - 6 %    Basophils Relative 2 (H) 0 - 1 %    Neutrophils Absolute 2 26 1 85 - 7 62 Thousands/µL    Lymphocytes Absolute 0 53 (L) 0 60 - 4 47 Thousands/µL    Monocytes Absolute 0 44 0 17 - 1 22 Thousand/µL    Eosinophils Absolute 0 09 0 00 - 0 61 Thousand/µL    Basophils Absolute 0 07 0 00 - 0 10 Thousands/µL   Fingerstick Glucose (POCT)    Collection Time: 11/03/17  7:41 AM   Result Value Ref Range    POC Glucose 106 65 - 140 mg/dl     MRI brain - 1  No acute infarction, intracranial hemorrhage or mass  No MR evidence of brain metastases  2  Trace, chronic microangiopathy  Images personally reviewed  A/P  60 y o  male with right temporal pain in setting of left scalp cellulitis and multiple medical problems    Doing well on neurontin, MRI ok  Hold on LP  Outpatient f/u with neurology but no further inpatient workup     Case d/w primary team

## 2017-11-03 NOTE — PROGRESS NOTES
PATIENT NAME: Phyllis Wang,  YOB: 1955  MEDICAL RECORD NUMBER: [id number]  Neurology Follow up Note     Following patient for: headache     Overnight Events: None    Subjective: Patient feels much improved today with no headache  Felt a bit groggy this AM  12 point ROS negative for any changes otherwise  Scheduled Meds:  al mag oxide-diphenhydramine-lidocaine viscous 10 mL Oral 4x Daily (AC & HS)   aspirin 81 mg Oral Every Other Day   citalopram 40 mg Oral Daily   docusate sodium 100 mg Oral BID   doxycycline hyclate 100 mg Oral Daily   folic acid 1 mg Oral Daily   fondaparinux 2 5 mg Subcutaneous Daily   gabapentin 600 mg Oral TID   Ibrutinib 280 mg Oral Daily   insulin glargine 10 Units Subcutaneous QAM   insulin lispro 1-6 Units Subcutaneous 4x Daily (AC & HS)   insulin lispro 10 Units Subcutaneous TID With Meals   magnesium gluconate 500 mg Oral HS   oxyCODONE 10 mg Oral Q12H OTONIEL   pantoprazole 40 mg Oral BID AC   predniSONE 20 mg Oral Daily   sucralfate 1,000 mg Oral 4x Daily (AC & HS)     Continuous Infusions:   PRN Meds:   acetaminophen    benzonatate    bisacodyl    HYDROmorphone    LORazepam    metoclopramide    ondansetron    oxyCODONE    SUMAtriptan      Objective  /92   Pulse 76   Temp 97 9 °F (36 6 °C) (Tympanic)   Resp 18   Wt 97 kg (213 lb 13 5 oz)   SpO2 99%   BMI 28 22 kg/m²   GEN: Comfortable, NAD  HEENT: NC/AT  Anicteric  PPC  MMM   CVS: RRR  S1S2  No M/R/G  Mental Status: The patient was awake, alert, attentive, oriented to person, place, and time  Recent and remote memory intact to conversation with no evidence of language dysfunction  Satisfactory fund of knowledge  Cranial Nerves:   I: smell Not tested   II: visual fields Full to confrontation  Pupils equal, round, reactive to light with normal accomodation      III,IV,VI: extraocular muscles EOMI, no nystagmus   V:  Sensation in the V1 through V3 distributions intact to pinprick and light touch bilaterally  VII: Face is symmetric with no weakness noted  VIII: Audition intact to finger rub bilaterally  IX/X: Uvula midline  Soft palate elevation symmetric  XI: Trapezius and SCM strength 5/5 B/L  XII: Tongue midline with no atrophy or fasciculations with appropriate movement  Motor Examination:   No drift, full strength throughout  Reflexes:    Trace throughout  Coordination: Patient able to perform normal finger-to-nose and heel to shin appropriately  Normal rapid alternating movements       S     Recent Results (from the past 24 hour(s))   Fingerstick Glucose (POCT)    Collection Time: 11/02/17 11:15 AM   Result Value Ref Range    POC Glucose 111 65 - 140 mg/dl   Fingerstick Glucose (POCT)    Collection Time: 11/02/17  4:14 PM   Result Value Ref Range    POC Glucose 249 (H) 65 - 140 mg/dl   Fingerstick Glucose (POCT)    Collection Time: 11/02/17  9:33 PM   Result Value Ref Range    POC Glucose 156 (H) 65 - 140 mg/dl   CBC and differential    Collection Time: 11/03/17  6:06 AM   Result Value Ref Range    WBC 3 39 (L) 4 31 - 10 16 Thousand/uL    RBC 3 43 (L) 3 88 - 5 62 Million/uL    Hemoglobin 10 0 (L) 12 0 - 17 0 g/dL    Hematocrit 32 4 (L) 36 5 - 49 3 %    MCV 95 82 - 98 fL    MCH 29 2 26 8 - 34 3 pg    MCHC 30 9 (L) 31 4 - 37 4 g/dL    RDW 15 8 (H) 11 6 - 15 1 %    Platelets 29 (LL) 954 - 390 Thousands/uL    nRBC 1 /100 WBCs    Neutrophils Relative 66 43 - 75 %    Lymphocytes Relative 16 14 - 44 %    Monocytes Relative 13 (H) 4 - 12 %    Eosinophils Relative 3 0 - 6 %    Basophils Relative 2 (H) 0 - 1 %    Neutrophils Absolute 2 26 1 85 - 7 62 Thousands/µL    Lymphocytes Absolute 0 53 (L) 0 60 - 4 47 Thousands/µL    Monocytes Absolute 0 44 0 17 - 1 22 Thousand/µL    Eosinophils Absolute 0 09 0 00 - 0 61 Thousand/µL    Basophils Absolute 0 07 0 00 - 0 10 Thousands/µL   Fingerstick Glucose (POCT)    Collection Time: 11/03/17  7:41 AM   Result Value Ref Range    POC Glucose 106 65 - 140 mg/dl         A/P  60 y o  male with right facial pain, improved with higher dose gabapentin  F/U MRI brain  ESR negative although CRP up  Hold off on LP ect as clinically stable, improving with medication  If MRI negative OK for discharge, should f/u in outpatient headache clinic  Case d/w SLIM

## 2017-11-03 NOTE — PROGRESS NOTES
Returned from MRI  Pt very pleased that test was able to be completed, and hopes for D/C 11/3/2017  C/o mild headache at present, but not severe enough to warrant analgesic

## 2017-11-03 NOTE — DISCHARGE SUMMARY
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)    Leukopenia due to antineoplastic chemotherapy (HCC)    Cellulitis of head or scalp    Headache around the eyes    CLL (chronic lymphocytic leukemia) ([facility] [address] )  Resolved Problems:    FUO (fever of unknown origin)      Consultations During Hospital Stay:  · Hematology/Oncology, Infectious Disease, Neurology    Procedures Performed:     · MRI brain:   1   No acute infarction, intracranial hemorrhage or mass   No MR evidence of brain metastases  2   Trace, chronic microangiopathy  Significant Findings / Test Results:     · WBC 3 39, Hb 10 0, Hct 32 4 Plt 29    Incidental Findings:   · None    Test Results Pending at Discharge (will require follow up): · None     Outpatient Tests Requested:  · CBC on Monday November 6, 3388    Complications:  none    Hospital Course:     Martha Lane is a 60 y o  male patient who originally presented to the hospital on 10/28/2017 due to reported fever at home, malaise and headache  The patient was found to be pancytopenic and neutropenic  The patient was recently diagnosed with cellulitis and completing course of doxycycline  The patient was given g next for his neutropenia, he was placed on neutropenic and contact precautions, and was given Granix to treat his neutropenia  The patient was evaluated by Infectious Disease physician who agreed with continuation of the doxycycline course    Given persistent headaches that patient was continuing to experience throughout his hospitalization, it was suggested that patient may have an element of post herpetic neurology a causing the persistent and characteristic headache  Neurology consult was requested and patient's Neurontin dose was increased to 600 mg 3 times daily  Patient was continuing to receive Dilaudid as needed with control of headaches optimized  An MRI of the brain was subsequently obtained and negative for intracranial process  The patient's thrombocytopenia was initially worsening during patient's hospitalization reaching levels below 20,000  At that point the patient received 1 unit of platelets with additional 2 units the following day  The patient's platelet increased to 29,000 at that time of the discharge  During hospitalization the patient's clinical condition remained otherwise stable with normal vital signs, glucose in acceptable range with good oral intake and no urinary or GI problems  The patient was instructed to  his medications at his pharmacy  He was given precautions regarding taking Dilaudid and he verbalized understanding  The patient was instructed to obtain CBC on Monday November 6th and to follow up with his PCP and his oncologist     Condition at Discharge: stable     Discharge Day Visit / Exam:     Subjective:  Patient seen and examined  He reports feeling well and states he would like to go home  He reports mild lingering headache at this time  Vitals: Blood Pressure: 154/92 (11/03/17 0747)  Pulse: 76 (11/03/17 0747)  Temperature: 97 9 °F (36 6 °C) (11/03/17 0747)  Temp Source: Tympanic (11/03/17 0747)  Respirations: 18 (11/03/17 0747)  Weight - Scale: 97 kg (213 lb 13 5 oz) (10/28/17 1220)  SpO2: 99 % (11/03/17 0747)    Exam:   Physical Exam   Constitutional: He is oriented to person, place, and time  He appears well-developed and well-nourished  HENT:   Head: Normocephalic     Mouth/Throat: Oropharynx is clear and moist    Eyes: Conjunctivae are normal    Neck: Normal range of motion  No JVD present  Cardiovascular: Normal rate and regular rhythm  Exam reveals no friction rub  No murmur heard  Pulmonary/Chest: Effort normal  No respiratory distress  He has no wheezes  Abdominal: He exhibits no distension  There is no tenderness  Musculoskeletal: Normal range of motion  He exhibits no edema  Neurological: He is alert and oriented to person, place, and time  Skin: Skin is warm and dry  Discussion with Family: No    Discharge instructions/Information to patient and family:   See after visit summary for information provided to patient and family  Provisions for Follow-Up Care:  See after visit summary for information related to follow-up care and any pertinent home health orders  Disposition:     Home    For Discharges to [facility] SNF:   · Not Applicable to this Patient - Not Applicable to this Patient    Planned Readmission: No     Discharge Statement:  I spent 35 minutes discharging the patient  This time was spent on the day of discharge  I had direct contact with the patient on the day of discharge  Greater than 50% of the total time was spent examining patient, answering all patient questions, arranging and discussing plan of care with patient as well as directly providing post-discharge instructions  Additional time then spent on discharge activities  Discharge Medications:  See after visit summary for reconciled discharge medications provided to patient and family        ** Please Note: This note has been constructed using a voice recognition system **

## 2017-11-06 ENCOUNTER — HOSPITAL ENCOUNTER (OUTPATIENT)
Dept: INFUSION CENTER | Facility: CLINIC | Age: 62
Discharge: HOME | End: 2017-11-06
Payer: MEDICARE

## 2017-11-06 VITALS — TEMPERATURE: 97.3 F

## 2017-11-06 LAB
ALBUMIN SERPL BCP-MCNC: 3.6 G/DL (ref 3.2–5)
ALP SERPL-CCNC: 38 U/L (ref 46–116)
ALT SERPL W P-5'-P-CCNC: 25 U/L (ref 12–78)
ANION GAP SERPL CALCULATED.3IONS-SCNC: 20 MMOL/L (ref 4–13)
ANISOCYTOSIS BLD QL SMEAR: PRESENT
AST SERPL W P-5'-P-CCNC: 23 U/L (ref 5–45)
BASOPHILS # BLD AUTO: 0.05 THOUSAND/UL (ref 0–0.1)
BASOPHILS NFR MAR MANUAL: 1 % (ref 0–1)
BILIRUB DIRECT SERPL-MCNC: 0.13 MG/DL (ref 0–0.2)
BILIRUB SERPL-MCNC: 0.6 MG/DL (ref 0.2–1)
BUN SERPL-MCNC: 22 MG/DL (ref 5–25)
CALCIUM SERPL-MCNC: 9.5 MG/DL (ref 8.3–10.1)
CHLORIDE SERPL-SCNC: 98 MMOL/L (ref 98–108)
CHOLEST SERPL-MCNC: 123 MG/DL (ref 50–200)
CO2 SERPL-SCNC: 27 MMOL/L (ref 21–32)
CREAT SERPL-MCNC: 1 MG/DL (ref 0.6–1.3)
DACRYOCYTES BLD QL SMEAR: PRESENT
EOSINOPHIL # BLD AUTO: 0.09 THOUSAND/UL (ref 0–0.61)
EOSINOPHIL NFR BLD MANUAL: 2 % (ref 0–6)
ERYTHROCYTE [DISTWIDTH] IN BLOOD BY AUTOMATED COUNT: 17.5 % (ref 11.6–15.1)
GFR SERPL CREATININE-BSD FRML MDRD: 80 ML/MIN/1.73SQ M
GLUCOSE SERPL-MCNC: 112 MG/DL (ref 65–140)
HCT VFR BLD AUTO: 31.3 % (ref 36.5–49.3)
HDLC SERPL-MCNC: 29 MG/DL (ref 40–60)
HGB BLD-MCNC: 9.8 G/DL (ref 12–17)
LDLC SERPL CALC-MCNC: 80 MG/DL (ref 0–100)
LYMPHOCYTES # BLD AUTO: 0.5 THOUSAND/UL (ref 0.6–4.47)
LYMPHOCYTES # BLD AUTO: 11 % (ref 14–44)
MCH RBC QN AUTO: 28.4 PG (ref 26.8–34.3)
MCHC RBC AUTO-ENTMCNC: 31.4 G/DL (ref 31.4–37.4)
MCV RBC AUTO: 91 FL (ref 82–98)
MONOCYTES # BLD AUTO: 0.18 THOUSAND/UL (ref 0–1.22)
MONOCYTES NFR BLD AUTO: 4 % (ref 4–12)
NEUTS BAND NFR BLD MANUAL: 2 % (ref 0–8)
NEUTS SEG # BLD: 3.69 THOUSAND/UL (ref 1.81–6.82)
NEUTS SEG NFR BLD AUTO: 80 % (ref 43–75)
OVALOCYTES BLD QL SMEAR: PRESENT
PLATELET # BLD AUTO: 56 THOUSANDS/UL (ref 149–390)
PLATELET BLD QL SMEAR: ABNORMAL
PMV BLD AUTO: 9.3 FL (ref 8.9–12.7)
POTASSIUM SERPL-SCNC: 4.2 MMOL/L (ref 3.5–5.3)
PROT SERPL-MCNC: 5.5 G/DL (ref 6.4–8.2)
RBC # BLD AUTO: 3.46 MILLION/UL (ref 3.88–5.62)
RETICS # AUTO: ABNORMAL 10*3/UL (ref 14356–105094)
RETICS # CALC: 5.93 % (ref 0.37–1.87)
SODIUM SERPL-SCNC: 145 MMOL/L (ref 136–145)
TOTAL CELLS COUNTED SPEC: 100
TRIGL SERPL-MCNC: 72 MG/DL
WBC # BLD AUTO: 4.5 THOUSAND/UL (ref 4.31–10.16)
WBC NRBC COR # BLD: 4.5 THOUSAND/UL (ref 4.31–10.16)

## 2017-11-06 PROCEDURE — 82248 BILIRUBIN DIRECT: CPT | Performed by: INTERNAL MEDICINE

## 2017-11-06 PROCEDURE — 85027 COMPLETE CBC AUTOMATED: CPT | Performed by: INTERNAL MEDICINE

## 2017-11-06 PROCEDURE — 85045 AUTOMATED RETICULOCYTE COUNT: CPT | Performed by: INTERNAL MEDICINE

## 2017-11-06 PROCEDURE — 80061 LIPID PANEL: CPT | Performed by: PHYSICIAN ASSISTANT

## 2017-11-06 PROCEDURE — 80053 COMPREHEN METABOLIC PANEL: CPT | Performed by: INTERNAL MEDICINE

## 2017-11-06 PROCEDURE — 85007 BL SMEAR W/DIFF WBC COUNT: CPT | Performed by: INTERNAL MEDICINE

## 2017-11-06 NOTE — PROGRESS NOTES
Pt arrived to unit without complaint  Blood work drawn from [facility] as per Dr Debra Springer standing order, lipid panel drawn as per Venus Morales PA-C order  Pt's CBC result (platelet=56) reported to Dr Debra Springer via Bharti Lora RN  Pt instructed to return on Thursday for repeat of his weekly labs  Pt instructed in same and verbalized understanding  AVS declined

## 2017-11-09 ENCOUNTER — HOSPITAL ENCOUNTER (OUTPATIENT)
Dept: INFUSION CENTER | Facility: CLINIC | Age: 62
Discharge: HOME | End: 2017-11-09
Payer: MEDICARE

## 2017-11-09 LAB
ALBUMIN SERPL BCP-MCNC: 3.6 G/DL (ref 3.2–5)
ALP SERPL-CCNC: 49 U/L (ref 46–116)
ALT SERPL W P-5'-P-CCNC: 19 U/L (ref 12–78)
ANION GAP SERPL CALCULATED.3IONS-SCNC: 8 MMOL/L (ref 4–13)
ANISOCYTOSIS BLD QL SMEAR: PRESENT
AST SERPL W P-5'-P-CCNC: 22 U/L (ref 5–45)
BASOPHILS # BLD AUTO: 0.05 THOUSAND/UL (ref 0–0.1)
BASOPHILS NFR MAR MANUAL: 1 % (ref 0–1)
BILIRUB DIRECT SERPL-MCNC: 0.14 MG/DL (ref 0–0.2)
BILIRUB SERPL-MCNC: 0.6 MG/DL (ref 0.2–1)
BUN SERPL-MCNC: 27 MG/DL (ref 5–25)
CALCIUM SERPL-MCNC: 9.3 MG/DL (ref 8.3–10.1)
CHLORIDE SERPL-SCNC: 108 MMOL/L (ref 98–108)
CO2 SERPL-SCNC: 28 MMOL/L (ref 21–32)
CREAT SERPL-MCNC: 1.2 MG/DL (ref 0.6–1.3)
DACRYOCYTES BLD QL SMEAR: PRESENT
EOSINOPHIL # BLD AUTO: 0.05 THOUSAND/UL (ref 0–0.61)
EOSINOPHIL NFR BLD MANUAL: 1 % (ref 0–6)
ERYTHROCYTE [DISTWIDTH] IN BLOOD BY AUTOMATED COUNT: 16.4 % (ref 11.6–15.1)
GFR SERPL CREATININE-BSD FRML MDRD: 64 ML/MIN/1.73SQ M
GLUCOSE SERPL-MCNC: 101 MG/DL (ref 65–140)
HCT VFR BLD AUTO: 31.7 % (ref 36.5–49.3)
HGB BLD-MCNC: 10.2 G/DL (ref 12–17)
LYMPHOCYTES # BLD AUTO: 0.52 THOUSAND/UL (ref 0.6–4.47)
LYMPHOCYTES # BLD AUTO: 10 % (ref 14–44)
MCH RBC QN AUTO: 28.9 PG (ref 26.8–34.3)
MCHC RBC AUTO-ENTMCNC: 32.4 G/DL (ref 31.4–37.4)
MCV RBC AUTO: 89 FL (ref 82–98)
MONOCYTES # BLD AUTO: 0.1 THOUSAND/UL (ref 0–1.22)
MONOCYTES NFR BLD AUTO: 2 % (ref 4–12)
MYELOCYTES NFR BLD MANUAL: 1 % (ref 0–1)
NEUTS BAND NFR BLD MANUAL: 8 % (ref 0–8)
NEUTS SEG # BLD: 4.42 THOUSAND/UL (ref 1.81–6.82)
NEUTS SEG NFR BLD AUTO: 77 % (ref 43–75)
OVALOCYTES BLD QL SMEAR: PRESENT
PLATELET # BLD AUTO: 71 THOUSANDS/UL (ref 149–390)
PLATELET BLD QL SMEAR: ABNORMAL
PMV BLD AUTO: 7.8 FL (ref 8.9–12.7)
POTASSIUM SERPL-SCNC: 4.4 MMOL/L (ref 3.5–5.3)
PROT SERPL-MCNC: 5.7 G/DL (ref 6.4–8.2)
RBC # BLD AUTO: 3.54 MILLION/UL (ref 3.88–5.62)
RETICS # AUTO: ABNORMAL 10*3/UL (ref 14356–105094)
RETICS # CALC: 3.34 % (ref 0.37–1.87)
SODIUM SERPL-SCNC: 144 MMOL/L (ref 136–145)
TOTAL CELLS COUNTED SPEC: 100
WBC # BLD AUTO: 5.2 THOUSAND/UL (ref 4.31–10.16)
WBC NRBC COR # BLD: 5.2 THOUSAND/UL (ref 4.31–10.16)

## 2017-11-09 PROCEDURE — 80053 COMPREHEN METABOLIC PANEL: CPT | Performed by: INTERNAL MEDICINE

## 2017-11-09 PROCEDURE — 85007 BL SMEAR W/DIFF WBC COUNT: CPT | Performed by: INTERNAL MEDICINE

## 2017-11-09 PROCEDURE — 85045 AUTOMATED RETICULOCYTE COUNT: CPT | Performed by: INTERNAL MEDICINE

## 2017-11-09 PROCEDURE — 85027 COMPLETE CBC AUTOMATED: CPT | Performed by: INTERNAL MEDICINE

## 2017-11-09 PROCEDURE — 82248 BILIRUBIN DIRECT: CPT | Performed by: INTERNAL MEDICINE

## 2017-11-09 NOTE — PROGRESS NOTES
Pt given lab results from today and was satisfied with the improvement  Hemoglobin 10 2, platelets 71, ANC 3 80  Port deaccessed and pt discharged home  Pt to have labs drawn again on Monday which is his usually scheduled day

## 2017-11-09 NOTE — PROGRESS NOTES
Written order for central labs today  Pt waiting for results  Labs drawn from port and sent to lab  Port flushed per protocol with saline only, pt has heparin allergy

## 2017-11-09 NOTE — PLAN OF CARE
Problem: Potential for Falls  Goal: Patient will remain free of falls  INTERVENTIONS:  - Assess patient frequently for physical needs  -  Identify cognitive and physical deficits and behaviors that affect risk of falls    -  [place] fall precautions as indicated by assessment   - Educate patient/family on patient safety including physical limitations  - Instruct patient to call for assistance with activity based on assessment  - Modify environment to reduce risk of injury  - Consider OT/PT consult to assist with strengthening/mobility   Outcome: Progressing

## 2017-11-13 ENCOUNTER — HOSPITAL ENCOUNTER (OUTPATIENT)
Dept: INFUSION CENTER | Facility: CLINIC | Age: 62
Discharge: HOME | End: 2017-11-13
Payer: MEDICARE

## 2017-11-13 LAB
ALBUMIN SERPL BCP-MCNC: 3.7 G/DL (ref 3.2–5)
ALP SERPL-CCNC: 41 U/L (ref 46–116)
ALT SERPL W P-5'-P-CCNC: 19 U/L (ref 12–78)
ANION GAP SERPL CALCULATED.3IONS-SCNC: 15 MMOL/L (ref 4–13)
ANISOCYTOSIS BLD QL SMEAR: PRESENT
AST SERPL W P-5'-P-CCNC: 26 U/L (ref 5–45)
BASOPHILS # BLD AUTO: 0 THOUSAND/UL (ref 0–0.1)
BASOPHILS NFR MAR MANUAL: 0 % (ref 0–1)
BILIRUB DIRECT SERPL-MCNC: 0.18 MG/DL (ref 0–0.2)
BILIRUB SERPL-MCNC: 0.6 MG/DL (ref 0.2–1)
BUN SERPL-MCNC: 23 MG/DL (ref 5–25)
CALCIUM SERPL-MCNC: 9.6 MG/DL (ref 8.3–10.1)
CHLORIDE SERPL-SCNC: 97 MMOL/L (ref 98–108)
CO2 SERPL-SCNC: 30 MMOL/L (ref 21–32)
CREAT SERPL-MCNC: 1 MG/DL (ref 0.6–1.3)
EOSINOPHIL # BLD AUTO: 0.06 THOUSAND/UL (ref 0–0.61)
EOSINOPHIL NFR BLD MANUAL: 1 % (ref 0–6)
ERYTHROCYTE [DISTWIDTH] IN BLOOD BY AUTOMATED COUNT: 17.2 % (ref 11.6–15.1)
GFR SERPL CREATININE-BSD FRML MDRD: 80 ML/MIN/1.73SQ M
GLUCOSE SERPL-MCNC: 185 MG/DL (ref 65–140)
HCT VFR BLD AUTO: 34.5 % (ref 36.5–49.3)
HGB BLD-MCNC: 11.1 G/DL (ref 12–17)
LG PLATELETS BLD QL SMEAR: PRESENT
LYMPHOCYTES # BLD AUTO: 0.34 THOUSAND/UL (ref 0.6–4.47)
LYMPHOCYTES # BLD AUTO: 6 % (ref 14–44)
MCH RBC QN AUTO: 28.7 PG (ref 26.8–34.3)
MCHC RBC AUTO-ENTMCNC: 32.2 G/DL (ref 31.4–37.4)
MCV RBC AUTO: 89 FL (ref 82–98)
METAMYELOCYTES NFR BLD MANUAL: 1 % (ref 0–1)
MONOCYTES # BLD AUTO: 0.17 THOUSAND/UL (ref 0–1.22)
MONOCYTES NFR BLD AUTO: 3 % (ref 4–12)
NEUTS BAND NFR BLD MANUAL: 2 % (ref 0–8)
NEUTS SEG # BLD: 5.07 THOUSAND/UL (ref 1.81–6.82)
NEUTS SEG NFR BLD AUTO: 87 % (ref 43–75)
OVALOCYTES BLD QL SMEAR: PRESENT
PLATELET # BLD AUTO: 80 THOUSANDS/UL (ref 149–390)
PLATELET BLD QL SMEAR: ABNORMAL
PMV BLD AUTO: 8.3 FL (ref 8.9–12.7)
POTASSIUM SERPL-SCNC: 3.8 MMOL/L (ref 3.5–5.3)
PROT SERPL-MCNC: 5.7 G/DL (ref 6.4–8.2)
RBC # BLD AUTO: 3.87 MILLION/UL (ref 3.88–5.62)
RETICS # AUTO: ABNORMAL 10*3/UL (ref 14356–105094)
RETICS # CALC: 2.88 % (ref 0.37–1.87)
SODIUM SERPL-SCNC: 142 MMOL/L (ref 136–145)
TOTAL CELLS COUNTED SPEC: 100
URATE SERPL-MCNC: 5 MG/DL (ref 4.2–8)
WBC # BLD AUTO: 5.7 THOUSAND/UL (ref 4.31–10.16)
WBC NRBC COR # BLD: 5.7 THOUSAND/UL (ref 4.31–10.16)

## 2017-11-13 PROCEDURE — 85027 COMPLETE CBC AUTOMATED: CPT | Performed by: INTERNAL MEDICINE

## 2017-11-13 PROCEDURE — 85045 AUTOMATED RETICULOCYTE COUNT: CPT | Performed by: INTERNAL MEDICINE

## 2017-11-13 PROCEDURE — 84550 ASSAY OF BLOOD/URIC ACID: CPT | Performed by: INTERNAL MEDICINE

## 2017-11-13 PROCEDURE — 80053 COMPREHEN METABOLIC PANEL: CPT | Performed by: INTERNAL MEDICINE

## 2017-11-13 PROCEDURE — 82248 BILIRUBIN DIRECT: CPT | Performed by: INTERNAL MEDICINE

## 2017-11-13 PROCEDURE — 82232 ASSAY OF BETA-2 PROTEIN: CPT | Performed by: INTERNAL MEDICINE

## 2017-11-13 PROCEDURE — 85007 BL SMEAR W/DIFF WBC COUNT: CPT | Performed by: INTERNAL MEDICINE

## 2017-11-13 NOTE — PLAN OF CARE
Problem: Potential for Falls  Goal: Patient will remain free of falls  INTERVENTIONS:  - Assess patient frequently for physical needs  -  Identify cognitive and physical deficits and behaviors that affect risk of falls    -  [place] fall precautions as indicated by assessment   - Educate patient/family on patient safety including physical limitations  - Instruct patient to call for assistance with activity based on assessment  - Modify environment to reduce risk of injury  - Consider OT/PT consult to assist with strengthening/mobility   Outcome: Progressing

## 2017-11-13 NOTE — PROGRESS NOTES
Pt given CBC results from today, port flushed per protocol with saline only, pt has allergy to Heparin

## 2017-11-14 ENCOUNTER — APPOINTMENT (OUTPATIENT)
Dept: INFUSION CENTER | Facility: CLINIC | Age: 62
End: 2017-11-14
Payer: MEDICARE

## 2017-11-14 LAB — B2 MICROGLOB SERPL-MCNC: 2.8 MG/L (ref 0.6–2.4)

## 2017-11-15 ENCOUNTER — ALLSCRIPTS OFFICE VISIT (OUTPATIENT)
Dept: OTHER | Facility: OTHER | Age: 62
End: 2017-11-15

## 2017-11-16 NOTE — PROGRESS NOTES
Assessment    1  Chronic lymphocytic leukemia (204 10) (C91 10)   2  Hemolytic anemia (283 9) (D58 9)   3  Thrombocytopenia (287 5) (D69 6)   4  Leukopenia (288 50) (D72 819)    Plan  Chronic lymphocytic leukemia    · Seek Immediate Medical Attention if: Your temperature is higher than 101 degreesFahrenheit ; Status:Complete;   Done: 29NCI0045   Ordered;lymphocytic leukemia; Ordered By:Proothi, Gideon;   · Follow-up visit in 3 weeks Evaluation and Treatment  Follow-up  Status: Complete  Done:04Nmq9540 08:30AM   Ordered;Chronic lymphocytic leukemia; Ordered By: Edmund Villegas Performed:  Due: 63ZMP6469; Last Updated By: Kristy Hillman; 11/15/2017 8:42:21 AM    Discussion/Summary  Discussion Summary:   Patient was hospitalized again on 10/28/2017 and was discharged on 11/03/2017  This time he was admitted because of pancytopenia with profound neutropenia and thrombocytopenia requiring granix and transfusions   he had cellulitis on his scalp requiring doxycycline and Keflex  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  Cellulitis has resolved  Presently no fever, chills or bleeding  to this admission he was hospitalized with uncontrolled diabetes mellitus and blood sugar was more than 600  He was in Insulin drip  He is on Insulin at home  He is on prednisone 20 mg p o  daily because of 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  Prednisone dose will be slowly tapered and blood counts will be monitored  He is on folic acid  For CLL he has been on IBRUTINIB 280 mg daily because of profound neutropenia  He is on the [place] and he has been tolerating that without much problem  He is responding to treatments  He had responded to venetoclax but then disease progressed and treatment was changed  He has a long-standing history of CLL  He has chronic lung disease and has exertional dyspnea  He has nonproductive cough  He has generalized weakness and tiredness  He has vascular purpura on his forearms  He has joint pains  No more muscle cramps  was diagnosed in 2001 and treated with FCR  He developed increased hemolysis on FCR  Treatment was changed to PCR  He improved  His most recent chemotherapy treatment was Cytoxan, Oncovin, prednisone and Rituxan and last treatment was in December 2012    In 2013 he was started on Rituxan, prednisone and folic acid because of autoimmune hemolytic anemia  IVIG was tried unsuccessfully  Rituxan controlled the hemolysis but he had reaction to Rituxan ? He had shaking chills and hyperventilation  He was taken to the emergency room, had cultures and was sent home  In January 2017 he was hospitalized with viral esophagitis and he received intravenous acyclovir at home  Prednisone dose is down to 5 mg a day  He is on folic acid  also has history of heparin induced thrombocytopenia and he does not flush Port-A-Cath with heparin  3/20/17 patient found to have hemoglobin of 6 2, ,000  He was admitted to [facility] for blood transfusion and was transferred to [address] [place] [address]  3/20/17 WBC count 195,000, hemoglobin 4 9, platelet count 65  Direct coomb's was positive with undetectable haptoglobin  He was given 2 units of PRBCs, Solu-Medrol 1 g Ã3 days and IVIG 1 g/kg daily Ã3 days  His hemoglobin continued to drop  Bone marrow biopsy on 3/21 showed marrow cellularity of greater than 95% almost replaced by small lymphocytes, lambda light chain restricted, CD20 positive, CD19 positive, CD23 positive partially expressing CD11c and CD25 and CD5 positive and negative for CD 79 and CD38  was treated with single dose of chlorambucil to control his CLL  second dose of chlorambucil, also Rituxan 375 mg/M Â² for autoimmune hemolytic anemia  WBC continued to rise, 266,000  initiated with Venetoclax 20 mg daily   Tumor lysis monitored every 8 hours; given aggressive hydration and Lasix and remained euvolemic dropped to 112,000WBC 63,44477,000, , platelet count 17,000  Patient became febrile, 101 3  The cefepime initiated Venetoclax held  fell from bed, CT head negative  ANC 1200, cefepime discontinued and Levaquin 75 mg daily started sliding 3/29 given second dose of rituximab 375 mg/m Â²meg to 14 5 thousand, platelets 99,827, Venetoclax restarted 10 mg every other dayWBC 4 4, , platelet count 79,215  WBC maintained less than 10,000, ANC and platelet count meg  He was discharged on Venetoclax 10 mg every other day  He was instructed to discontinue simvastatin, Ranexa, aspirin, metoprolol 50 mg q  day, losartan 50 mg q  day, Plavix 75 mg q  day, folic acid 499 Âµg b i d , prednisone 60 mg, allopurinol  was advised to continue Levaquin 750 mg daily for 10 days, Lexapro 10 mg daily, fenofibrate 50 mg daily, gabapentin 100 mg twice daily, Protonix 40 mg daily  studies performed at [address] [place] [address] echocardiogram while inpatient at [facility] on 3/21  This study was unremarkable  chest abdomen pelvis without contrast on 3/24 showed stable pulmonary nodules, patchy groundglass densities of lower lobes previously identified and which may represent volume loss or early infiltrate  Persistent diffuse bronchial wall thickening suggesting acute/chronic bronchitis changes  No bronchiectasis  No masses  Stable lymphadenopathy of mediastinum  Stable axillary lymphadenopathy  Splenomegaly 23 9 cm  Extensive lymphadenopathy throughout the entire retroperitoneal, small bowel mesentery, and pelvis  Largest lymph node in right lower quadrant measured 4 6 x 4 8  Stable enlarged left para-aortic lymph node measuring 6 2 x 6  8  Patient received one dose of Rituxan on 4/7/17  Venetoclax 10 mg every other day 4/5/17 - 4/9/17  Venetoclax 10 mg every day beginning 4/10/17  Monitoring CBC 3 times per week  patient had follow-up appointment at [facility] with Dr Mu Drew  She recommended to slowly increase dose of veneteclax   Also, she recommended as he has had numerous treatments with Rituxan, if antibody directed therapy becomes indicated in the future recommendation was with [facility]  She will be seeing her on a p r n  basis  examination and test results are as recorded and discussed  Reviewed hospital records  Plan is to continue with tapering dose of prednisone, folic acid, reduced dose IBRUTINIB and Gazyva  Management of diabetes, hypertension and other medical problems as been done by primary physician  Patient has been taking vitamin-D and calcium  and plan discussed in detail  Questions answered  Counseling Documentation With Imm: The patient, patient's family was counseled regarding diagnostic results,-- instructions for management,-- risk factor reductions,-- prognosis,-- patient and family education,-- impressions,-- importance of compliance with treatment  total time of encounter was 35 minutes-- and-- 20 minutes was spent counseling  Goals and Barriers: The patient has the current Goals: Treatment of CLL with autoimmune hemolytic anemia  The patent has the current Barriers: Limited options  Patient's Capacity to Self-Care: Patient is able to Self-Care  Medication SE Review and Pt Understands Tx: Possible side effects of new medications were reviewed with the patient/guardian today  The treatment plan was reviewed with the patient/guardian  The patient/guardian understands and agrees with the treatment plan   Self Referrals:   Self Referrals: No   Understands and agrees with treatment plan: The treatment plan was reviewed with the patient/guardian  The patient/guardian understands and agrees with the treatment plan      Chief Complaint  Chief Complaint: Chief Complaint:  The patient presents to the office today with CLL with hemolytic anemia  Diabetes mellitus with very high blood sugar  History of Present Illness  HPI: Patient was hospitalized again on 10/28/2017 and was discharged on 11/03/2017   This time he was admitted because of pancytopenia with profound neutropenia and thrombocytopenia requiring granix and transfusions   he had cellulitis on his scalp requiring doxycycline and Keflex  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  Cellulitis has resolved  Presently no fever, chills or bleeding  to this admission he was hospitalized with uncontrolled diabetes mellitus and blood sugar was more than 600  He was in Insulin drip  He is on Insulin at home  He is on prednisone 20 mg p o  daily because of 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  Prednisone dose will be slowly tapered and blood counts will be monitored  He is on folic acid  For CLL he has been on IBRUTINIB 280 mg daily because of profound neutropenia  He is on the [place] and he has been tolerating that without much problem  He is responding to treatments  He had responded to venetoclax but then disease progressed and treatment was changed  He has a long-standing history of CLL  He has chronic lung disease and has exertional dyspnea  He has nonproductive cough  He has generalized weakness and tiredness  He has vascular purpura on his forearms  He has joint pains  No more muscle cramps  was diagnosed in 2001 and treated with FCR  He developed increased hemolysis on FCR  Treatment was changed to PCR  He improved  His most recent chemotherapy treatment was Cytoxan, Oncovin, prednisone and Rituxan and last treatment was in December 2012    In 2013 he was started on Rituxan, prednisone and folic acid because of autoimmune hemolytic anemia  IVIG was tried unsuccessfully  Rituxan controlled the hemolysis but he had reaction to Rituxan ? He had shaking chills and hyperventilation  He was taken to the emergency room, had cultures and was sent home  In January 2017 he was hospitalized with viral esophagitis and he received intravenous acyclovir at home  Prednisone dose is down to 5 mg a day  He is on folic acid  also has history of heparin induced thrombocytopenia and he does not flush Port-A-Cath with heparin  3/20/17 patient found to have hemoglobin of 6 2, ,000  He was admitted to [place] [address] for blood transfusion and was transferred to [address] [place] [address]  3/20/17 WBC count 195,000, hemoglobin 4 9, platelet count 65  Direct coomb's was positive with undetectable haptoglobin  He was given 2 units of PRBCs, Solu-Medrol 1 g Ã3 days and IVIG 1 g/kg daily Ã3 days  His hemoglobin continued to drop  Bone marrow biopsy on 3/21 showed marrow cellularity of greater than 95% almost replaced by small lymphocytes, lambda light chain restricted, CD20 positive, CD19 positive, CD23 positive partially expressing CD11c and CD25 and CD5 positive and negative for CD 79 and CD38  was treated with single dose of chlorambucil to control his CLL  second dose of chlorambucil, also Rituxan 375 mg/M Â² for autoimmune hemolytic anemia  WBC continued to rise, 266,000  initiated with Venetoclax 20 mg daily  Tumor lysis monitored every 8 hours; given aggressive hydration and Lasix and remained euvolemic dropped to 112,000WBC 63,98349,000, , platelet count 76,700  Patient became febrile, 101 3  The cefepime initiated Venetoclax held  fell from bed, CT head negative  ANC 1200, cefepime discontinued and Levaquin 75 mg daily started sliding 3/29 given second dose of rituximab 375 mg/m Â²meg to 14 5 thousand, platelets 94,905, Venetoclax restarted 10 mg every other dayWBC 4 4, , platelet count 37,716  WBC maintained less than 10,000, ANC and platelet count meg  He was discharged on Venetoclax 10 mg every other day  He was instructed to discontinue simvastatin, Ranexa, aspirin, metoprolol 50 mg q  day, losartan 50 mg q  day, Plavix 75 mg q  day, folic acid 876 Âµg b i d , prednisone 60 mg, allopurinol  was advised to continue Levaquin 750 mg daily for 10 days, Lexapro 10 mg daily, fenofibrate 50 mg daily, gabapentin 100 mg twice daily, Protonix 40 mg daily  studies performed at [address] [place] [address] echocardiogram while inpatient at [facility] on 3/21  This study was unremarkable  chest abdomen pelvis without contrast on 3/24 showed stable pulmonary nodules, patchy groundglass densities of lower lobes previously identified and which may represent volume loss or early infiltrate  Persistent diffuse bronchial wall thickening suggesting acute/chronic bronchitis changes  No bronchiectasis  No masses  Stable lymphadenopathy of mediastinum  Stable axillary lymphadenopathy  Splenomegaly 23 9 cm  Extensive lymphadenopathy throughout the entire retroperitoneal, small bowel mesentery, and pelvis  Largest lymph node in right lower quadrant measured 4 6 x 4 8  Stable enlarged left para-aortic lymph node measuring 6 2 x 6  8  Patient received one dose of Rituxan on 4/7/17  Venetoclax 10 mg every other day 4/5/17 - 4/9/17  Venetoclax 10 mg every day beginning 4/10/17  Monitoring CBC 3 times per week  patient had follow-up appointment at [facility] with Dr Praveena Campo  She recommended to slowly increase dose of veneteclax  Also, she recommended as he has had numerous treatments with Rituxan, if antibody directed therapy becomes indicated in the future recommendation was with [facility]  She will be seeing her on a p r n  basis  Review of Systems  Reviewed 13 systems  Other symptoms as in HPI  Complete-Male:  Constitutional: recent --Ulb weight gain-- and-- feeling tired, but-- no fever,-- not feeling poorly,-- no chills-- and-- no recent weight loss  Eyes: eyesight problems-- and-- Blurred vision when sugar went up, but-- no eye pain,-- no dryness of the eyes,-- eyes not red,-- no purulent discharge from the eyes-- and-- no itching of the eyes  ENT: no complaints of earache, no hearing loss, no nosebleeds, no nasal discharge, no sore throat, no hoarseness    Cardiovascular: as noted in HPI,-- the heart rate was not slow,-- no chest pain,-- no intermittent leg claudication,-- the heart rate was not fast,-- no palpitations-- and-- no extremity edema  Respiratory: cough-- and-- shortness of breath during exertion, but-- no shortness of breath,-- no orthopnea,-- no wheezing-- and-- no PND  Gastrointestinal: No complaints of abdominal pain, no constipation, no nausea or vomiting, no diarrhea or bloody stools  Genitourinary: No complaints of dysuria, no incontinence, no hesitancy, no nocturia, no genital lesion, no testicular pain  Musculoskeletal: arthralgias-- and-- joint stiffness, but-- no joint swelling,-- no limb pain,-- no myalgias-- and-- no limb swelling  Integumentary: Vascular purpura on forearms, but-- No complaints of skin rash or skin lesions, no itching, no skin wound, no dry skin  Neurological: No compliants of headache, no confusion, no convulsions, no numbness or tingling, no dizziness or fainting, no limb weakness, no difficulty walking  Psychiatric: anxiety, but-- no personality change,-- no sleep disturbances-- and-- no emotional problems  Endocrine: muscle weakness-- and-- feelings of weakness, but-- no proptosis,-- no deepening of the voice-- and-- no hot flashes  Hematologic/Lymphatic: a tendency for easy bruising-- and-- Vascular purpura forearms, but-- no swollen glands,-- no tendency for easy bleeding-- and-- no swollen glands in the neck  ROS Reviewed:   ROS reviewed  Active Problems    1  Abdominal pain (789 00) (R10 9)   2  Anemia (285 9) (D64 9)   3  Cellulitis of hand without finger or thumb, right (682 4) (L03 113)   4  Cellulitis of head or scalp (682 8) (L03 811)   5  Chemotherapy-induced nausea (787 02,E933 1) (R11 0,T45  1X5A)   6  Chronic kidney disease, unspecified CKD stage (585 9) (N18 9)   7  Chronic lymphocytic leukemia (204 10) (C91 10)   8  Chronic maxillary sinusitis (473 0) (J32 0)   9  Coronary artery disease (414 00) (I25 10)   10  Dyslipidemia (272 4) (E78 5)   11  Dysphagia (787 20) (R13 10)   12  Esophagitis, reflux (530 11) (K21 0)   13   Heart disease (429 9) (I51 9)   14  Hemolytic anemia (283 9) (D58 9)   15  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,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)   16  History of hemolytic anemia (V12 3) (Z86 2)   17  Hyperlipidemia (272 4) (E78 5)   18  Hypokalemia (276 8) (E87 6)   19  Joint pain (719 40) (M25 50)   20  Leukopenia (288 50) (D72 819)   21  Lower extremity edema (782 3) (R60 0)   22  Muscle Cramps (729 82)   23  Neutropenia (288 00) (D70 9)   24  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) (R76 12)   25  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,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,T38 0X5A)   26  Thrombocytopenia (287 5) (D69 6)   27  Ulcer of esophagus without bleeding (530 20) (K22 10)     Reviewed   Past Medical History    1  History of Acute myocardial infarction (410 90) (I21 9)   2  History of Coronary Artery Disease (V12 59)   3  History of Dyslipidemia (272 4) (E78 5)   4  History of acute bronchitis (V12 69) (Z87 09)   5  History of bronchitis (V12 69) (Z87 09)   6  History of hemolytic anemia (V12 3) (Z86 2)   7  History of hypertension (V12 59) (Z86 79)   8  History of neutropenia (V12 3) (Z86 2)   9  History of thrombocytopenia (V12 3) (Z86 2)     Reviewed   Surgical History  1  History of Cardiac Cath Lesion 1, 1st Adjunct Treat Device: Stent   2  History of Foot Surgery   3  History of Hand Surgery   4  History of Knee Surgery  Surgical History Reviewed: The surgical history was reviewed and updated today  Family History  Mother    1  Family history of Chronic Leukemia (V16 6)   2  Denied: Family history of Colon cancer   3  Denied: Family history of colon cancer   4  Denied: Family history of Crohn's disease   5  Denied: Family history of liver disease   6  Family history of Polyps Of The Sigmoid Colon  Father    7  Denied: Family history of Colon cancer   8  Denied: Family history of colon cancer   9  Denied: Family history of Crohn's disease   10  Denied: Family history of liver disease  Family History Reviewed:    The family history was reviewed and updated today  Social History     · Being A Social Drinker   · Current every day smoker (305 1) (F17 200)  Social History Reviewed: The social history was reviewed and updated today  Current Meds   1  Aspirin 81 MG Oral Tablet Delayed Release; Therapy: 11Sep2017 to Recorded   2  BD Insulin Syringe Ultrafine 31G X 5/16 1 ML Miscellaneous; use four daily; Therapy: 97FLH2915 to [id number]); Last Rx:26Oct2017 Ordered   3  Benzonatate 200 MG Oral Capsule; TAKE 1 CAPSULE 3 TIMES DAILY AS NEEDED; Therapy: 24HGT5404 to (77 873 135)  Requested for: 07Sep2017; Last Rx:07Sep2017 Ordered   4  Carafate 1 GM/10ML Oral Suspension; TAKE 10 ML 4 TIMES DAILY; Therapy: 22YPB1633 to (Asya Parrish)  Requested for: 12SFS3373; Last Rx:05Oct2017 Ordered   5  Citalopram Hydrobromide 40 MG Oral Tablet; Therapy: 11Sep2017 to Recorded   6  Clotrimazole 10 MG Mouth/Throat Cindy; ALLOW 1 CINDY TO SLOWLY DISSOLVE IN MOUTH  4 TIMES A DAY; Therapy: 63UFC0920 to (Evaluate:26Wfn4432)  Requested for: 35PAU6549; Last Rx:15Mar2017 Ordered   7  Doxycycline Hyclate 100 MG Oral Tablet; TAKE 1 TABLET DAILY AS DIRECTED; Therapy: 11ZPT4171 to (Evaluate:17Nov2017)  Requested for: 46EKB9342; Last Rx:18Oct2017 Ordered   8  Doxycycline Hyclate 100 MG Oral Tablet; TAKE 1 TABLET EVERY 12 HOURS DAILY; Therapy: 48SPS2095 to (Evaluate:66Mkh2135)  Requested for: 05Sep2017; Last Rx:30Ljx3834 Ordered   9  Fenofibrate 50 MG Oral Capsule; TAKE 1 CAPSULE DAILY WITH A MEAL; Therapy: (Recorded:05Apr2017) to Recorded   10  Folic Acid 1 MG Oral Tablet; Therapy: (Recorded:07Mar2014) to Recorded   11  Gabapentin 100 MG Oral Capsule; TAKE 1 CAPSULE 3 TIMES DAILY; Therapy: 62KVC7390 to (Owen Brody)  Requested for: 25Rep9498; Last  Rx:20Uog6720 Ordered   12  Gazyva 1000 MG/40ML Intravenous Solution; Therapy: 11Sep2017 to Recorded   13  GNP Potassium Gluconate 595 (99 K) MG Oral Tablet; Take 1 tablet daily Recorded   14   Imbruvica 140 MG Oral Capsule; TAKE 2 CAPSULE Daily; Therapy: 95Mty2029 to (Evaluate:08Jan2018)  Requested for: 06FGK7574; Last  Rx:10Oct2017 Ordered   15  Keflex 500 MG Oral Capsule; Therapy: (Recorded:18Oct2017) to Recorded   16  Ondansetron 8 MG Oral Tablet Disintegrating; 1 tab PO Q 8 hr PRN nausea; Therapy: 55DQL4855 to (Last Rx:15Mar2017)  Requested for: 28YPR0687 Ordered   17  Pantoprazole Sodium 40 MG Oral Tablet Delayed Release; TAKE 1 TABLET TWICE A DAY; Therapy: 29NKY8696 to (Last Rx:61Hkk5024)  Requested for: 21Afp8695 Ordered   18  Percocet 5-325 MG Oral Tablet; Therapy: (Recorded:18Oct2017) to Recorded   19  Potassium Chloride ER 10 MEQ Oral Tablet Extended Release; one daily  or as advised; Therapy: 29XFD9673 to ([id number])  Requested for: 29TRX1564; Last  Rx:75Ynz4565 Ordered   20  PredniSONE 20 MG Oral Tablet; TAKE 2 TABLETS DAILY WITH FOOD AS DIRECTED; Therapy: 94WOY9445 to (Evaluate:75Ity5354)  Requested for: 23HQG2585; Last  Rx:20Ymn4948 Ordered   21  SLPG Magic Mouthwash 1:1:1 maalox/diphenhydramine/lidocaine; 15 ml PO q6 prn; Therapy: 49SOS4833 to (Evaluate:60Mzp0133); Last Rx:05Oct2017 Ordered   22  TraMADol HCl - 50 MG Oral Tablet; TAKE 1 TABLET EVERY 6 HOURS AS NEEDED FOR  ONGOING PAIN;  Therapy: 18FZQ0496 to (Last Rx:10Dhw7967) Ordered    Allergies    1  Heparin     Reviewed  History of HIT   Vitals  Vital Signs    Recorded: 01RNJ7332 08:19AM   Temperature 98 4 F   Heart Rate 81   Respiration 18   Systolic 460   Diastolic 72   Height 6 ft    Weight 215 lb 4 oz   BMI Calculated 29 19   BSA Calculated 2 2   O2 Saturation 99   Pain Scale 8          Reviewed  High blood pressure  Physical Exam         Patient is alert and oriented and not in  distress  Stable vital signs except high blood pressure  No icterus  No oral thrush  No palpable neck mass  Lung fields are clear to percussion and auscultation  Heart rate is regular  Abdomen soft and nontender  No palpable abdominal mass  No ascites  No edema of ankles  No calf tenderness  No focal neurological deficit  No skin rash  Vascular purpura forearms  Decreased muscle mass  No palpable lymphadenopathy  Good arterial pulses  No clubbing  Anxious  Performance status one  ECOG 1       Results/Data  (1) CBC/PLT/DIFF 56HKP9994 08:22AM Proothi, Gideon     Test Name Result Flag Reference   WBC COUNT 5 70 Thousand/uL  4 31-10 16   WBC ADJUSTED 5 70 Thousand/uL  4 31-10 16   RBC COUNT 3 87 Million/uL L 3 88-5 62   HEMOGLOBIN 11 1 g/dL L 12 0-17 0   HEMATOCRIT 34 5 % L 36 5-49 3   MCV 89 fL  82-98   MCH 28 7 pg  26 8-34 3   MCHC 32 2 g/dL  31 4-37 4   RDW 17 2 % H 11 6-15 1   MPV 8 3 fL L 8 9-12 7   PLATELET COUNT 80 Thousands/uL L 149-390     (1) COMPREHENSIVE METABOLIC PANEL 30XPN5188 74:52BH Proothi, Gideon     Test Name Result Flag Reference   GLUCOSE,RANDM 185 mg/dL H      If the patient is fasting, the ADA then defines impaired fasting glucose as > 100 mg/dL and diabetes as > or equal to 123 mg/dL  Specimen collection should occur prior to Sulfasalazine administration due to the potential for falsely depressed results  Specimen collection should occur prior to Sulfapyridine administration due to the potential for falsely elevated results  SODIUM 142 mmol/L  136-145   POTASSIUM 3 8 mmol/L  3 5-5 3   CHLORIDE 97 mmol/L L    CARBON DIOXIDE 30 mmol/L  21-32   ANION GAP (CALC) 15 mmol/L H 4-13   BLOOD UREA NITROGEN 23 mg/dL  5-25   CREATININE 1 00 mg/dL  0 60-1 30   Standardized to IDMS reference method   CALCIUM 9 6 mg/dL  8 3-10 1   BILI, TOTAL 0 60 mg/dL  0 20-1 00   ALK PHOSPHATAS 41 U/L L    ALT (SGPT) 19 U/L  12-78   Specimen collection should occur prior to Sulfasalazine administration due to the potential for falsely depressed results  AST(SGOT) 26 U/L  5-45   Specimen collection should occur prior to Sulfasalazine administration due to the potential for falsely depressed results     ALBUMIN 3 7 g/dL  3 2-5 0   TOTAL PROTEIN 5 7 g/dL L 6 4-8 2   eGFR 80 ml/min/1 73sq m       SPECIMEN SLIGHTLY LIPEMIC, RESULTS MAY BE AFFECTED  National Kidney Disease Education Program recommendations are as follows: GFR calculation is accurate only with a steady state creatinine Chronic Kidney disease less than 60 ml/min/1 73 sq  meters Kidney failure less than 15 ml/min/1 73 sq  meters  (1) RETICULOCYTE COUNT 30OOZ4560 08:22AM Proothi, Gideon     Test Name Result Flag Reference   RETIC ABS # [id number] H [id number]   RETIC % 2 88 % H 0 37-1 87     (1) URIC ACID 38EQC6157 08:22AM Proothi, Gideon     Test Name Result Flag Reference   URIC ACID 5 0 mg/dL  4 2-8 0   Specimen collection should occur prior to Metamizole administration due to the potential for falsely depressed results  (1) BILIRUBIN, DIRECT [id number] 08:22AM Proothi, Gideon     Test Name Result Flag Reference   BILI, DIRECT 0 18 mg/dL  0 00-0 20     (1) B-2 MICROGLOBULIN [id number] 08:22AM Proothi, Gideon     Test Name Result Flag Reference   B-2 MICROGLOBUL 2 8 mg/L H 0 6 - 2 4     Siemens Immulite 2000 Immunochemiluminometric assay [facility]) Performed at:  [address]  [id number] : Aaron Lou MD, Phone:  [phone]     Health Management  Esophagitis, reflux   EGD; every 3 months; Last 99MIP4119; Next Due: 06GQV8519; Active    Future Appointments    Date/Time Provider Specialty Site   01/18/2018 01:00 PM TYSHAWN Cole   Endocrinology [facility] ENDOCRINOLOGY   12/06/2017 08:30 AM Ro Crabtree, [facility] Hematology Oncology [facility]       Signatures   Electronically signed by : Librado Ko MD; Nov 15 2017  8:30PM EST                       (Author)    Electronically signed by : Librado Ko MD; Nov 15 2017  8:31PM EST                       (Author)    Electronically signed by : Librado Ko MD; Nov 15 2017  8:32PM EST                       (Author)

## 2017-11-20 ENCOUNTER — HOSPITAL ENCOUNTER (OUTPATIENT)
Dept: INFUSION CENTER | Facility: CLINIC | Age: 62
Discharge: HOME | End: 2017-11-20
Payer: MEDICARE

## 2017-11-20 ENCOUNTER — GENERIC CONVERSION - ENCOUNTER (OUTPATIENT)
Dept: OTHER | Facility: OTHER | Age: 62
End: 2017-11-20

## 2017-11-20 LAB
ALBUMIN SERPL BCP-MCNC: 3.6 G/DL (ref 3.2–5)
ALP SERPL-CCNC: 40 U/L (ref 46–116)
ALT SERPL W P-5'-P-CCNC: 20 U/L (ref 12–78)
ANION GAP SERPL CALCULATED.3IONS-SCNC: 5 MMOL/L (ref 4–13)
AST SERPL W P-5'-P-CCNC: 23 U/L (ref 5–45)
BASOPHILS # BLD AUTO: 0.03 THOUSAND/UL (ref 0–0.1)
BASOPHILS NFR MAR MANUAL: 1 % (ref 0–1)
BILIRUB DIRECT SERPL-MCNC: 0.15 MG/DL (ref 0–0.2)
BILIRUB SERPL-MCNC: 0.7 MG/DL (ref 0.2–1)
BUN SERPL-MCNC: 22 MG/DL (ref 5–25)
CALCIUM SERPL-MCNC: 9.5 MG/DL (ref 8.3–10.1)
CHLORIDE SERPL-SCNC: 107 MMOL/L (ref 98–108)
CO2 SERPL-SCNC: 28 MMOL/L (ref 21–32)
CREAT SERPL-MCNC: 1 MG/DL (ref 0.6–1.3)
EOSINOPHIL # BLD AUTO: 0.13 THOUSAND/UL (ref 0–0.61)
EOSINOPHIL NFR BLD MANUAL: 5 % (ref 0–6)
ERYTHROCYTE [DISTWIDTH] IN BLOOD BY AUTOMATED COUNT: 16.9 % (ref 11.6–15.1)
GFR SERPL CREATININE-BSD FRML MDRD: 80 ML/MIN/1.73SQ M
GLUCOSE SERPL-MCNC: 126 MG/DL (ref 65–140)
HCT VFR BLD AUTO: 35.6 % (ref 36.5–49.3)
HGB BLD-MCNC: 10.6 G/DL (ref 12–17)
IGG SERPL-MCNC: 164 MG/DL (ref 700–1600)
LDH SERPL-CCNC: 202 U/L (ref 81–234)
LG PLATELETS BLD QL SMEAR: PRESENT
LYMPHOCYTES # BLD AUTO: 0.35 THOUSAND/UL (ref 0.6–4.47)
LYMPHOCYTES # BLD AUTO: 14 % (ref 14–44)
MCH RBC QN AUTO: 26.7 PG (ref 26.8–34.3)
MCHC RBC AUTO-ENTMCNC: 29.6 G/DL (ref 31.4–37.4)
MCV RBC AUTO: 90 FL (ref 82–98)
MONOCYTES # BLD AUTO: 0.2 THOUSAND/UL (ref 0–1.22)
MONOCYTES NFR BLD AUTO: 8 % (ref 4–12)
NEUTS BAND NFR BLD MANUAL: 4 % (ref 0–8)
NEUTS SEG # BLD: 1.8 THOUSAND/UL (ref 1.81–6.82)
NEUTS SEG NFR BLD AUTO: 68 % (ref 43–75)
OVALOCYTES BLD QL SMEAR: PRESENT
PLATELET # BLD AUTO: 81 THOUSANDS/UL (ref 149–390)
PLATELET BLD QL SMEAR: ABNORMAL
PMV BLD AUTO: 7.2 FL (ref 8.9–12.7)
POTASSIUM SERPL-SCNC: 4 MMOL/L (ref 3.5–5.3)
PROT SERPL-MCNC: 5.4 G/DL (ref 6.4–8.2)
RBC # BLD AUTO: 3.96 MILLION/UL (ref 3.88–5.62)
RETICS # AUTO: ABNORMAL 10*3/UL (ref 14356–105094)
RETICS # CALC: 3.31 % (ref 0.37–1.87)
SODIUM SERPL-SCNC: 140 MMOL/L (ref 136–145)
TOTAL CELLS COUNTED SPEC: 100
URATE SERPL-MCNC: 4.8 MG/DL (ref 4.2–8)
WBC # BLD AUTO: 2.5 THOUSAND/UL (ref 4.31–10.16)
WBC NRBC COR # BLD: 2.5 THOUSAND/UL (ref 4.31–10.16)

## 2017-11-20 PROCEDURE — 83615 LACTATE (LD) (LDH) ENZYME: CPT | Performed by: INTERNAL MEDICINE

## 2017-11-20 PROCEDURE — 85007 BL SMEAR W/DIFF WBC COUNT: CPT | Performed by: INTERNAL MEDICINE

## 2017-11-20 PROCEDURE — 82248 BILIRUBIN DIRECT: CPT | Performed by: INTERNAL MEDICINE

## 2017-11-20 PROCEDURE — 80053 COMPREHEN METABOLIC PANEL: CPT | Performed by: INTERNAL MEDICINE

## 2017-11-20 PROCEDURE — 84550 ASSAY OF BLOOD/URIC ACID: CPT | Performed by: INTERNAL MEDICINE

## 2017-11-20 PROCEDURE — 82784 ASSAY IGA/IGD/IGG/IGM EACH: CPT | Performed by: INTERNAL MEDICINE

## 2017-11-20 PROCEDURE — 85045 AUTOMATED RETICULOCYTE COUNT: CPT | Performed by: INTERNAL MEDICINE

## 2017-11-20 PROCEDURE — 85027 COMPLETE CBC AUTOMATED: CPT | Performed by: INTERNAL MEDICINE

## 2017-11-20 PROCEDURE — 82232 ASSAY OF BETA-2 PROTEIN: CPT | Performed by: INTERNAL MEDICINE

## 2017-11-20 RX ORDER — SODIUM CHLORIDE 9 MG/ML
20 INJECTION, SOLUTION INTRAVENOUS CONTINUOUS
Status: DISCONTINUED | OUTPATIENT
Start: 2017-11-21 | End: 2017-11-24 | Stop reason: HOSPADM

## 2017-11-20 RX ORDER — ACETAMINOPHEN 325 MG/1
650 TABLET ORAL ONCE
Status: COMPLETED | OUTPATIENT
Start: 2017-11-21 | End: 2017-11-21

## 2017-11-21 ENCOUNTER — HOSPITAL ENCOUNTER (OUTPATIENT)
Dept: INFUSION CENTER | Facility: CLINIC | Age: 62
Discharge: HOME | End: 2017-11-21
Payer: MEDICARE

## 2017-11-21 VITALS
TEMPERATURE: 98.5 F | DIASTOLIC BLOOD PRESSURE: 87 MMHG | RESPIRATION RATE: 20 BRPM | HEART RATE: 77 BPM | SYSTOLIC BLOOD PRESSURE: 151 MMHG

## 2017-11-21 LAB — B2 MICROGLOB SERPL-MCNC: 2.9 MG/L (ref 0.6–2.4)

## 2017-11-21 PROCEDURE — 96413 CHEMO IV INFUSION 1 HR: CPT

## 2017-11-21 PROCEDURE — 96415 CHEMO IV INFUSION ADDL HR: CPT

## 2017-11-21 PROCEDURE — 96368 THER/DIAG CONCURRENT INF: CPT

## 2017-11-21 PROCEDURE — 96367 TX/PROPH/DG ADDL SEQ IV INF: CPT

## 2017-11-21 RX ADMIN — SODIUM CHLORIDE 20 ML/HR: 0.9 INJECTION, SOLUTION INTRAVENOUS at 08:21

## 2017-11-21 RX ADMIN — ACETAMINOPHEN 650 MG: 325 TABLET, FILM COATED ORAL at 08:19

## 2017-11-21 RX ADMIN — DIPHENHYDRAMINE HYDROCHLORIDE 25 MG: 50 INJECTION, SOLUTION INTRAMUSCULAR; INTRAVENOUS at 08:21

## 2017-11-21 RX ADMIN — DEXAMETHASONE SODIUM PHOSPHATE 20 MG: 10 INJECTION, SOLUTION INTRAMUSCULAR; INTRAVENOUS at 08:44

## 2017-11-21 RX ADMIN — OBINUTUZUMAB 1000 MG: 1000 INJECTION, SOLUTION, CONCENTRATE INTRAVENOUS at 09:49

## 2017-11-21 NOTE — PLAN OF CARE
Problem: Potential for Falls  Goal: Patient will remain free of falls  INTERVENTIONS:  - Assess patient frequently for physical needs  -  Identify cognitive and physical deficits and behaviors that affect risk of falls    -  [place] fall precautions as indicated by assessment   - Educate patient/family on patient safety including physical limitations  - Instruct patient to call for assistance with activity based on assessment  - Modify environment to reduce risk of injury  - Consider OT/PT consult to assist with strengthening/mobility   Outcome: Progressing

## 2017-11-27 ENCOUNTER — HOSPITAL ENCOUNTER (OUTPATIENT)
Dept: INFUSION CENTER | Facility: CLINIC | Age: 62
Discharge: HOME | End: 2017-11-27
Payer: MEDICARE

## 2017-11-27 LAB
ALBUMIN SERPL BCP-MCNC: 3.9 G/DL (ref 3.2–5)
ALP SERPL-CCNC: 40 U/L (ref 46–116)
ALT SERPL W P-5'-P-CCNC: 20 U/L (ref 12–78)
ANION GAP SERPL CALCULATED.3IONS-SCNC: 8 MMOL/L (ref 4–13)
ANISOCYTOSIS BLD QL SMEAR: PRESENT
AST SERPL W P-5'-P-CCNC: 25 U/L (ref 5–45)
BASOPHILS # BLD AUTO: 0 THOUSAND/UL (ref 0–0.1)
BASOPHILS NFR MAR MANUAL: 0 % (ref 0–1)
BILIRUB DIRECT SERPL-MCNC: 0.12 MG/DL (ref 0–0.2)
BILIRUB SERPL-MCNC: 0.6 MG/DL (ref 0.2–1)
BUN SERPL-MCNC: 20 MG/DL (ref 5–25)
CALCIUM SERPL-MCNC: 9.4 MG/DL (ref 8.3–10.1)
CHLORIDE SERPL-SCNC: 104 MMOL/L (ref 98–108)
CO2 SERPL-SCNC: 29 MMOL/L (ref 21–32)
CREAT SERPL-MCNC: 1 MG/DL (ref 0.6–1.3)
DACRYOCYTES BLD QL SMEAR: PRESENT
EOSINOPHIL # BLD AUTO: 0.07 THOUSAND/UL (ref 0–0.61)
EOSINOPHIL NFR BLD MANUAL: 2 % (ref 0–6)
ERYTHROCYTE [DISTWIDTH] IN BLOOD BY AUTOMATED COUNT: 16 % (ref 11.6–15.1)
GFR SERPL CREATININE-BSD FRML MDRD: 80 ML/MIN/1.73SQ M
GLUCOSE SERPL-MCNC: 106 MG/DL (ref 65–140)
HCT VFR BLD AUTO: 41.4 % (ref 36.5–49.3)
HGB BLD-MCNC: 13.5 G/DL (ref 12–17)
LG PLATELETS BLD QL SMEAR: PRESENT
LYMPHOCYTES # BLD AUTO: 0.49 THOUSAND/UL (ref 0.6–4.47)
LYMPHOCYTES # BLD AUTO: 14 % (ref 14–44)
MCH RBC QN AUTO: 29 PG (ref 26.8–34.3)
MCHC RBC AUTO-ENTMCNC: 32.6 G/DL (ref 31.4–37.4)
MCV RBC AUTO: 89 FL (ref 82–98)
MONOCYTES # BLD AUTO: 0.11 THOUSAND/UL (ref 0–1.22)
MONOCYTES NFR BLD AUTO: 3 % (ref 4–12)
NEUTS BAND NFR BLD MANUAL: 1 % (ref 0–8)
NEUTS SEG # BLD: 2.84 THOUSAND/UL (ref 1.81–6.82)
NEUTS SEG NFR BLD AUTO: 80 % (ref 43–75)
OVALOCYTES BLD QL SMEAR: PRESENT
PLATELET # BLD AUTO: 57 THOUSANDS/UL (ref 149–390)
PLATELET BLD QL SMEAR: ABNORMAL
PMV BLD AUTO: 7.9 FL (ref 8.9–12.7)
POTASSIUM SERPL-SCNC: 4.2 MMOL/L (ref 3.5–5.3)
PROT SERPL-MCNC: 6 G/DL (ref 6.4–8.2)
RBC # BLD AUTO: 4.66 MILLION/UL (ref 3.88–5.62)
RETICS # AUTO: ABNORMAL 10*3/UL (ref 14356–105094)
RETICS # CALC: 2.56 % (ref 0.37–1.87)
SODIUM SERPL-SCNC: 141 MMOL/L (ref 136–145)
TOTAL CELLS COUNTED SPEC: 100
WBC # BLD AUTO: 3.5 THOUSAND/UL (ref 4.31–10.16)
WBC NRBC COR # BLD: 3.5 THOUSAND/UL (ref 4.31–10.16)

## 2017-11-27 PROCEDURE — 82248 BILIRUBIN DIRECT: CPT | Performed by: PHYSICIAN ASSISTANT

## 2017-11-27 PROCEDURE — 85007 BL SMEAR W/DIFF WBC COUNT: CPT | Performed by: PHYSICIAN ASSISTANT

## 2017-11-27 PROCEDURE — 80053 COMPREHEN METABOLIC PANEL: CPT | Performed by: PHYSICIAN ASSISTANT

## 2017-11-27 PROCEDURE — 85027 COMPLETE CBC AUTOMATED: CPT | Performed by: PHYSICIAN ASSISTANT

## 2017-11-27 PROCEDURE — 85045 AUTOMATED RETICULOCYTE COUNT: CPT | Performed by: PHYSICIAN ASSISTANT

## 2017-11-27 NOTE — PROGRESS NOTES
Central labs drawn via port  Catheter maintenance performed per protocol without complications  Pt discharged in stable condition and aware of next infusion appointment   Declined AVS

## 2017-12-04 ENCOUNTER — HOSPITAL ENCOUNTER (OUTPATIENT)
Dept: INFUSION CENTER | Facility: CLINIC | Age: 62
Discharge: HOME | End: 2017-12-04
Payer: MEDICARE

## 2017-12-04 ENCOUNTER — GENERIC CONVERSION - ENCOUNTER (OUTPATIENT)
Dept: OTHER | Facility: OTHER | Age: 62
End: 2017-12-04

## 2017-12-04 LAB
ALBUMIN SERPL BCP-MCNC: 3.9 G/DL (ref 3.2–5)
ALP SERPL-CCNC: 54 U/L (ref 46–116)
ALT SERPL W P-5'-P-CCNC: 21 U/L (ref 12–78)
ANION GAP SERPL CALCULATED.3IONS-SCNC: 10 MMOL/L (ref 4–13)
AST SERPL W P-5'-P-CCNC: 31 U/L (ref 5–45)
BASOPHILS # BLD AUTO: 0 THOUSAND/UL (ref 0–0.1)
BASOPHILS NFR MAR MANUAL: 0 % (ref 0–1)
BILIRUB DIRECT SERPL-MCNC: 0.14 MG/DL (ref 0–0.2)
BILIRUB SERPL-MCNC: 0.8 MG/DL (ref 0.2–1)
BUN SERPL-MCNC: 21 MG/DL (ref 5–25)
CALCIUM SERPL-MCNC: 9.2 MG/DL (ref 8.3–10.1)
CHLORIDE SERPL-SCNC: 104 MMOL/L (ref 98–108)
CO2 SERPL-SCNC: 28 MMOL/L (ref 21–32)
CREAT SERPL-MCNC: 1.2 MG/DL (ref 0.6–1.3)
EOSINOPHIL # BLD AUTO: 0.21 THOUSAND/UL (ref 0–0.61)
EOSINOPHIL NFR BLD MANUAL: 6 % (ref 0–6)
ERYTHROCYTE [DISTWIDTH] IN BLOOD BY AUTOMATED COUNT: 15.2 % (ref 11.6–15.1)
GFR SERPL CREATININE-BSD FRML MDRD: 64 ML/MIN/1.73SQ M
GLUCOSE SERPL-MCNC: 124 MG/DL (ref 65–140)
HCT VFR BLD AUTO: 40.8 % (ref 36.5–49.3)
HGB BLD-MCNC: 13.2 G/DL (ref 12–17)
HGB RETIC QN AUTO: 28.6 PG (ref 30–38.3)
IMM RETICS NFR: 2.6 % (ref 0–14)
LYMPHOCYTES # BLD AUTO: 0.56 THOUSAND/UL (ref 0.6–4.47)
LYMPHOCYTES # BLD AUTO: 16 % (ref 14–44)
MCH RBC QN AUTO: 28 PG (ref 26.8–34.3)
MCHC RBC AUTO-ENTMCNC: 32.3 G/DL (ref 31.4–37.4)
MCV RBC AUTO: 87 FL (ref 82–98)
MONOCYTES # BLD AUTO: 0.28 THOUSAND/UL (ref 0–1.22)
MONOCYTES NFR BLD AUTO: 8 % (ref 4–12)
NEUTS BAND NFR BLD MANUAL: 2 % (ref 0–8)
NEUTS SEG # BLD: 2.45 THOUSAND/UL (ref 1.81–6.82)
NEUTS SEG NFR BLD AUTO: 68 % (ref 43–75)
OVALOCYTES BLD QL SMEAR: PRESENT
PLATELET # BLD AUTO: 30 THOUSANDS/UL (ref 149–390)
PLATELET BLD QL SMEAR: ABNORMAL
PMV BLD AUTO: 8.5 FL (ref 8.9–12.7)
POTASSIUM SERPL-SCNC: 3.9 MMOL/L (ref 3.5–5.3)
PROT SERPL-MCNC: 6.1 G/DL (ref 6.4–8.2)
RBC # BLD AUTO: 4.72 MILLION/UL (ref 3.88–5.62)
RETICS # AUTO: ABNORMAL 10*3/UL (ref 14356–105094)
RETICS # CALC: 1.34 % (ref 0.37–1.87)
SODIUM SERPL-SCNC: 142 MMOL/L (ref 136–145)
TOTAL CELLS COUNTED SPEC: 100
WBC # BLD AUTO: 3.5 THOUSAND/UL (ref 4.31–10.16)
WBC NRBC COR # BLD: 3.5 THOUSAND/UL (ref 4.31–10.16)

## 2017-12-04 PROCEDURE — 85007 BL SMEAR W/DIFF WBC COUNT: CPT | Performed by: PHYSICIAN ASSISTANT

## 2017-12-04 PROCEDURE — 85046 RETICYTE/HGB CONCENTRATE: CPT | Performed by: PHYSICIAN ASSISTANT

## 2017-12-04 PROCEDURE — 85027 COMPLETE CBC AUTOMATED: CPT | Performed by: PHYSICIAN ASSISTANT

## 2017-12-04 PROCEDURE — 80053 COMPREHEN METABOLIC PANEL: CPT | Performed by: PHYSICIAN ASSISTANT

## 2017-12-04 PROCEDURE — 82248 BILIRUBIN DIRECT: CPT | Performed by: PHYSICIAN ASSISTANT

## 2017-12-06 ENCOUNTER — ALLSCRIPTS OFFICE VISIT (OUTPATIENT)
Dept: OTHER | Facility: OTHER | Age: 62
End: 2017-12-06

## 2017-12-07 ENCOUNTER — HOSPITAL ENCOUNTER (OUTPATIENT)
Dept: INFUSION CENTER | Facility: CLINIC | Age: 62
Discharge: HOME | End: 2017-12-07
Payer: MEDICARE

## 2017-12-07 VITALS
TEMPERATURE: 97.1 F | RESPIRATION RATE: 18 BRPM | DIASTOLIC BLOOD PRESSURE: 64 MMHG | HEART RATE: 89 BPM | SYSTOLIC BLOOD PRESSURE: 112 MMHG

## 2017-12-07 LAB
ANISOCYTOSIS BLD QL SMEAR: PRESENT
BASOPHILS # BLD AUTO: 0 THOUSAND/UL (ref 0–0.1)
BASOPHILS NFR MAR MANUAL: 0 % (ref 0–1)
BLASTS NFR BLD MANUAL: 0 %
EOSINOPHIL # BLD AUTO: 0.21 THOUSAND/UL (ref 0–0.61)
EOSINOPHIL NFR BLD MANUAL: 5 % (ref 0–6)
ERYTHROCYTE [DISTWIDTH] IN BLOOD BY AUTOMATED COUNT: 14.6 % (ref 11.6–15.1)
HCT VFR BLD AUTO: 41.3 % (ref 36.5–49.3)
HGB BLD-MCNC: 13.3 G/DL (ref 12–17)
LYMPHOCYTES # BLD AUTO: 0.88 THOUSAND/UL (ref 0.6–4.47)
LYMPHOCYTES # BLD AUTO: 21 % (ref 14–44)
MCH RBC QN AUTO: 27.3 PG (ref 26.8–34.3)
MCHC RBC AUTO-ENTMCNC: 32.2 G/DL (ref 31.4–37.4)
MCV RBC AUTO: 85 FL (ref 82–98)
MONOCYTES # BLD AUTO: 0.08 THOUSAND/UL (ref 0–1.22)
MONOCYTES NFR BLD AUTO: 2 % (ref 4–12)
NEUTS BAND NFR BLD MANUAL: 1 % (ref 0–8)
NEUTS SEG # BLD: 2.86 THOUSAND/UL (ref 1.81–6.82)
NEUTS SEG NFR BLD AUTO: 67 % (ref 43–75)
PLATELET # BLD AUTO: 48 THOUSANDS/UL (ref 149–390)
PLATELET BLD QL SMEAR: ABNORMAL
PMV BLD AUTO: 8.5 FL (ref 8.9–12.7)
RBC # BLD AUTO: 4.86 MILLION/UL (ref 3.88–5.62)
TOTAL CELLS COUNTED SPEC: 100
VARIANT LYMPHS # BLD AUTO: 4 % (ref 0–0)
WBC # BLD AUTO: 4.2 THOUSAND/UL (ref 4.31–10.16)
WBC NRBC COR # BLD: 4.2 THOUSAND/UL (ref 4.31–10.16)

## 2017-12-07 PROCEDURE — 85027 COMPLETE CBC AUTOMATED: CPT | Performed by: INTERNAL MEDICINE

## 2017-12-07 PROCEDURE — 85007 BL SMEAR W/DIFF WBC COUNT: CPT | Performed by: INTERNAL MEDICINE

## 2017-12-07 NOTE — PROGRESS NOTES
Patient presented today for port flush & labs at 1005, platelet count 45,089, [facility] notified of same  Will return for next labwork as scheduled

## 2017-12-08 NOTE — PROGRESS NOTES
Assessment    1  HZV (herpes zoster virus) post 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 19) (B02 29)    Plan  Chronic lymphocytic leukemia, Hemolytic anemia    · PredniSONE 20 MG Oral Tablet; TAKE 1 TABLET DAILY WITH FOOD ASDIRECTED   Rx By: Michelle Perla; Dispense: 0 Days ; #:60 Tablet; Refill: 0;Chronic lymphocytic leukemia, Hemolytic anemia; SHERON = N; Verified Transmission to [facility]/PHARM[facility]Y #[id number] Last Updated By: System, SureScripts; 12/6/2017 9:21:50 AM  Esophagitis, reflux    · Pantoprazole Sodium 40 MG Oral Tablet Delayed Release (Protonix); TAKE 1TABLET DAILY IN AM PRIOR TO BREAKFAST   Rx By: Michelle Perla; Dispense: 0 Days ; #:60 Tablet Delayed Release; Refill: 6;Esophagitis, reflux; SHERON = N; Verified Transmission to [facility]/PHARMACY #[id number] Last Updated By: System, SureScripts; 12/6/2017 9:21:49 AM  HZV (herpes zoster virus) post herpetic neuralgia    · OxyCONTIN 15 MG Oral Tablet ER 12 Hour Abuse-Deterrent; TAKE 1 TABLETEVERY 12 HOURS DAILY   Rx By: Michelle Perla; Dispense: 30 Days ; #:60 Tablet ER 12 Hour Abuse-Deterrent; Refill: 0;HZV (herpes zoster virus) post herpetic neuralgia; SHERON = N; Print Rx   · Follow-up visit in 3 weeks Evaluation and Treatment  Follow-up  Status: Complete  Done:26Aon3636 08:45AM   Ordered;HZV (herpes zoster virus) post herpetic neuralgia; Ordered By: Michelle Perla Performed:  Due: 78XLE7954; Last Updated By: Lalitha Marmolejo; 12/6/2017 9:30:13 AM    Discussion/Summary  Discussion Summary:   60year old male presents for follow-up for CLL  CLL with history of hemolytic anemia- history outlined in the HPI  Presented he is on ibrutinib 280 mg PO daily  Dose was reduced due to neutropenia  He has been holding for the past 3 days due to thrombocytpenia, 30,000  He will recheck CBC tomorrow  Will call with directions  He continues on prednisone 20 mg PO daily and folic acid  Also, he is on treatment with Gazyva 1000 mg IV every 4 weeks    recent onset diabetes - due to chronic steroid use  He is seeing endocrinology  Currently on insulin  Seeing endocrine in January  cellulitis on scalp/shingles of scalp/post herpetic neuralgia-he has been admitted multiple times over the past 2 months due to recurrent pain of the head/scalp  Thought to be cellulitis of the scalp and he was treated with antibiotics  He has continued pain of the scalp area, almost surrounding the entire surface of head  He was evaluated by Neurology and Infectious Disease while inpatient  They feel that this may also have been an atypical presentation of herpes zoster with now post herpetic neuralgia  He has had poor pain control at home  Tramadol and oxycodone 10 mg is not of help  At this time we will initiate OxyContin extended release 15 mg, twice daily  He advised to call our office if this is not controlling the pain  If this is in fact post herpetic neuralgia, pain can last for several months  Of note, patient had brain MRI on 11/03/2017 which was negative  voiced understanding and agreement in the above discussion  Aware to contact us with questions/symptoms in the interim  Counseling Documentation With Imm: The patient was counseled regarding diagnostic results,-- instructions for management,-- prognosis,-- patient and family education,-- impressions,-- risks and benefits of treatment options,-- importance of compliance with treatment  total time of encounter was 30 minutes-- and-- 20 minutes was spent counseling  Goals and Barriers: The patient has the current Goals: Prolonged survival, management of pain  The patent has the current Barriers: None  Patient's Capacity to Self-Care: Patient is able to Self-Care  Medication SE Review and Pt Understands Tx: Possible side effects of new medications were reviewed with the patient/guardian today  The treatment plan was reviewed with the patient/guardian   The patient/guardian understands and agrees with the treatment plan   Self Referrals:   Self Referrals: No History of Present Illness  HPI: CLL was diagnosed in 2001 and treated with FCR  He developed increased hemolysis on FCR  Treatment was changed to PCR  He improved  also has history of heparin induced thrombocytopenia and he does not flush Port-A-Cath with heparin  2012 chemotherapy treatment was Cytoxan, Oncovin, prednisone and Rituxan  In 2013 he was started on Rituxan, prednisone and folic acid because of autoimmune hemolytic anemia  IVIG was tried unsuccessfully  Rituxan controlled the hemolysis but he had reaction to Rituxan ? He had shaking chills and hyperventilation  He was taken to the emergency room, had cultures and was sent home  In January 2017 he was hospitalized with viral esophagitis and he received intravenous acyclovir at home  Prednisone dose is down to 5 mg a day  He is on folic acid  3/20/17 patient found to have hemoglobin of 6 2, ,000  He was admitted to [address] [place] [address] for blood transfusion and plan to transfer to [address] [place] [address]  3/20/17 WBC count 195,000, hemoglobin 4 9, platelet count 65  Direct coomb's was positive with undetectable haptoglobin  He was given 2 units of PRBCs, Solu-Medrol 1 g Ã3 days and IVIG 1 g/kg daily Ã3 days  His hemoglobin continued to drop  Bone marrow biopsy on 3/21 showed marrow cellularity of greater than 95% almost replaced by small lymphocytes, lambda light chain restricted, CD20 positive, CD19 positive, CD23 positive partially expressing CD11c and CD25 and CD5 positive and negative for CD 79 and CD38  was treated with single dose of chlorambucil to control his CLL  second dose of chlorambucil, also Rituxan 375 mg/M Â² autoimmune hemolytic anemia  WBC continued to rise, 266,000  initiated with Venetoclax 20 mg daily  Tumor lysis monitored every 8 hours; given aggressive hydration and Lasix and remained euvolemic dropped to 112,000WBC 63,60352,000, , platelet count 55,706  Patient became febrile, 101 3   The cefepime initiated Venetoclax held  fell from bed, CT head negative  ANC 1200, cefepime discontinued and Levaquin 75 mg daily started sliding 3/29 given second dose of rituximab 375 mg/m Â²meg to 14 5 thousand, platelets 19,054, Venetoclax restarted 10 mg every other dayWBC 4 4, , platelet count 44,071  WBC maintained less than 10,000, ANC and platelet count meg  He was discharged on Venetoclax 10 mg every other day  He was instructed to discontinue simvastatin, Ranexa, aspirin, metoprolol 50 mg q  day, losartan 50 mg q  day, Plavix 75 mg q  day, folic acid 452 Âµg b i d , prednisone 60 mg, allopurinol  was advised to continue Levaquin 750 mg daily for 10 days, Lexapro 10 mg daily, fenofibrate 50 mg daily, gabapentin 100 mg twice daily, Protonix 40 mg daily  studies performed at [address] [place] [address] echocardiogram while inpatient at [facility] on 3/21  This study was unremarkable  chest abdomen pelvis without contrast on 3/24 showed stable pulmonary nodules, patchy groundglass densities of lower lobes previously identified and which may represent volume loss or early infiltrate  Persistent diffuse bronchial wall thickening suggesting acute/chronic bronchitis changes  No bronchiectasis  No masses  Stable lymphadenopathy of mediastinum  Stable axillary lymphadenopathy  Splenomegaly 23 9 cm  Extensive lymphadenopathy throughout the entire retroperitoneal, small bowel mesentery, and pelvis  Largest lymph node in right lower quadrant measured 4 6 x 4 8  Stable enlarged left para-aortic lymph node measuring 6 2 x 6  8  Patient received one dose of Rituxan on 4/7/17  Venetoclax 10 mg every other day 4/5/17 - 4/9/17  Venetoclax 10 mg every day beginning 4/10/17  Monitoring CBC 3 times per week  patient had follow-up appointment at [facility] with Dr Merritt Friend  She recommended to slowly increase dose of veneteclax   Also, she recommended as he has had numerous treatments with Rituxan, if antibody directed therapy becomes indicated in the future recommendation was with [facility]  She will be seeing her on a p r n  basis  2017- Hemolysis  Disease not under control with veneteclax  Started prednisone 60 mg daily, folic acid, IBRUTINIB 384 mg daily and Gazyva  2017- 9/18-9/20 patient was admitted due to uncontrolled hyperglycemia with new onset DM type II due to chronic prednisone use for CLL/hemolytic anemia; also found to have profound neutropenia  Ibrutinib dose was reduced to 280 mg HRIPQ2567- 10/7/17 - 10/14/17 patient was admitted due to cellulitis on his scalp   Current Therapy: Ibrutinib 140 mg, 2 tabs daily  1000 mg every 4 weeks   Interval History: no relief with tramadoltaking oxycodone 10 mg every 6 hours with some benefit  improvement with gabapentintaking 1200 mg tylenol in the AM, then if no benefit, would take tramodol  10 mg then dull pain to 4-5/10       Review of Systems  Complete-Male:  Constitutional: recent weight gain, but-- no fever,-- not feeling poorly,-- no chills-- and-- not feeling tired  Eyes: No complaints of eye pain, no red eyes, no discharge from eyes, no itchy eyes  ENT: no complaints of earache, no hearing loss, no nosebleeds, no nasal discharge, no sore throat, no hoarseness  Cardiovascular: No complaints of slow heart rate, no fast heart rate, no chest pain, no palpitations, no leg claudication, no lower extremity  Respiratory: No complaints of shortness of breath, no wheezing, no cough, no SOB on exertion, no orthopnea or PND  Gastrointestinal: No complaints of abdominal pain, no constipation, no nausea or vomiting, no diarrhea or bloody stools  Genitourinary: No complaints of dysuria, no incontinence, no hesitancy, no nocturia, no genital lesion, no testicular pain  Musculoskeletal: as noted in HPI  Integumentary: No complaints of skin rash or skin lesions, no itching, no skin wound, no dry skin    Neurological: No compliants of headache, no confusion, no convulsions, no numbness or tingling, no dizziness or fainting, no limb weakness, no difficulty walking  Psychiatric: no anxiety-- and-- no depression  Endocrine: no muscle weakness-- and-- no feelings of weakness  Hematologic/Lymphatic: No complaints of swollen glands, no swollen glands in the neck, does not bleed easily, no easy bruising  ROS Reviewed:   ROS reviewed  Active Problems  1  Abdominal pain (789 00) (R10 9)   2  Anemia (285 9) (D64 9)   3  Cellulitis of hand without finger or thumb, right (682 4) (L03 113)   4  Cellulitis of head or scalp (682 8) (L03 811)   5  Chemotherapy-induced nausea (787 02,E933 1) (R11 0,T45  1X5A)   6  Chronic kidney disease, unspecified CKD stage (585 9) (N18 9)   7  Chronic lymphocytic leukemia (204 10) (C91 10)   8  Chronic maxillary sinusitis (473 0) (J32 0)   9  Coronary artery disease (414 00) (I25 10)   10  Dyslipidemia (272 4) (E78 5)   11  Dysphagia (787 20) (R13 10)   12  Esophagitis, reflux (530 11) (K21 0)   13  Heart disease (429 9) (I51 9)   14  Hemolytic anemia (283 9) (D58 9)   15  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,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)   16  History of hemolytic anemia (V12 3) (Z86 2)   17  Hyperlipidemia (272 4) (E78 5)   18  Hypokalemia (276 8) (E87 6)   19  Joint pain (719 40) (M25 50)   20  Leukopenia (288 50) (D72 819)   21  Lower extremity edema (782 3) (R60 0)   22  Muscle Cramps (729 82)   23  Neutropenia (288 00) (D70 9)   24  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) (R76 12)   25  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,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,T38 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)   26  Thrombocytopenia (287 5) (D69 6)   27  Ulcer of esophagus without bleeding (530 20) (K22 10)    Past Medical History  1  History of Acute myocardial infarction (410 90) (I21 9)   2  History of Coronary Artery Disease (V12 59)   3  History of Dyslipidemia (272 4) (E78 5)   4  History of acute bronchitis (V12 69) (Z87 09)   5  History of bronchitis (V12 69) (Z87 09)   6  History of hemolytic anemia (V12 3) (Z86 2)   7   History of hypertension (V12 59) (Z86 79)   8  History of neutropenia (V12 3) (Z86 2)   9  History of thrombocytopenia (V12 3) (Z86 2)    Surgical History  1  History of Cardiac Cath Lesion 1, 1st Adjunct Treat Device: Stent   2  History of Foot Surgery   3  History of Hand Surgery   4  History of Knee Surgery  Surgical History Reviewed: The surgical history was reviewed and updated today  Family History  Mother    1  Family history of Chronic Leukemia (V16 6)   2  Denied: Family history of Colon cancer   3  Denied: Family history of colon cancer   4  Denied: Family history of Crohn's disease   5  Denied: Family history of liver disease   6  Family history of Polyps Of The Sigmoid Colon  Father    7  Denied: Family history of Colon cancer   8  Denied: Family history of colon cancer   9  Denied: Family history of Crohn's disease   10  Denied: Family history of liver disease  Family History Reviewed: The family history was reviewed and updated today  Social History     · Being A Social Drinker   · Current every day smoker (305 1) (F17 200)  Social History Reviewed: The social history was reviewed and updated today  The social history was reviewed and is unchanged  Current Meds   1  Aspirin 81 MG Oral Tablet Delayed Release; Therapy: 11Sep2017 to Recorded   2  BD Insulin Syringe Ultrafine 31G X 5/16 1 ML Miscellaneous; use four daily; Therapy: 05MPY9118 to 451 1892); Last Rx:26Oct2017 Ordered   3  Benzonatate 200 MG Oral Capsule; TAKE 1 CAPSULE 3 TIMES DAILY AS NEEDED; Therapy: 89IMI3352 to ([phone])  Requested for: 50Hir8141; Last Rx:07Sep2017 Ordered   4  Carafate 1 GM/10ML Oral Suspension; TAKE 10 ML 4 TIMES DAILY; Therapy: 21XIF2414 to (Ambrosio Patient)  Requested for: 01QPO3296; Last Rx:05Oct2017 Ordered   5  Citalopram Hydrobromide 40 MG Oral Tablet; Therapy: 70Lnu0192 to Recorded   6  Clotrimazole 10 MG Mouth/Throat Cindy; ALLOW 1 CINDY TO SLOWLY DISSOLVE IN MOUTH  4 TIMES A DAY;  Therapy: 11TWM2475 to (Evaluate:73Rnk0791)  Requested for: 18GLI2653; Last Rx:15Mar2017 Ordered   7  Doxycycline Hyclate 100 MG Oral Tablet; TAKE 1 TABLET DAILY AS DIRECTED; Therapy: 67PFH0624 to (Evaluate:17Nov2017)  Requested for: 24MNQ9345; Last Rx:18Oct2017 Ordered   8  Doxycycline Hyclate 100 MG Oral Tablet; TAKE 1 TABLET EVERY 12 HOURS DAILY; Therapy: 00NUQ2641 to (Evaluate:15Sep2017)  Requested for: 34Foq2728; Last Rx:05Sep2017 Ordered   9  Fenofibrate 50 MG Oral Capsule; TAKE 1 CAPSULE DAILY WITH A MEAL; Therapy: (Recorded:05Apr2017) to Recorded   10  Folic Acid 1 MG Oral Tablet; Therapy: (Recorded:07Mar2014) to Recorded   11  Gabapentin 100 MG Oral Capsule; TAKE 1 CAPSULE 3 TIMES DAILY; Therapy: 63ATG4439 to (Viveca Boas)  Requested for: 34Yew8685; Last  Rx:04Aug2015 Ordered   12  Gazyva 1000 MG/40ML Intravenous Solution; Therapy: 11Sep2017 to Recorded   13  GNP Potassium Gluconate 595 (99 K) MG Oral Tablet; Take 1 tablet daily Recorded   14  Imbruvica 140 MG Oral Capsule; TAKE 2 CAPSULE Daily; Therapy: 25Ajz4886 to (Evaluate:08Jan2018)  Requested for: 09RWX6602; Last  Rx:10Oct2017 Ordered   15  Keflex 500 MG Oral Capsule; Therapy: (Recorded:18Oct2017) to Recorded   16  Ondansetron 8 MG Oral Tablet Disintegrating; 1 tab PO Q 8 hr PRN nausea; Therapy: 69PSK5084 to (Last Rx:15Mar2017)  Requested for: 60WZP3113 Ordered   17  Pantoprazole Sodium 40 MG Oral Tablet Delayed Release; TAKE 1 TABLET TWICE A DAY; Therapy: 67ESO9763 to (Last Rx:82Dmx1224)  Requested for: 58Lsx5416 Ordered   18  Percocet 5-325 MG Oral Tablet; Therapy: (Recorded:18Oct2017) to Recorded   19  Potassium Chloride ER 10 MEQ Oral Tablet Extended Release; one daily  or as advised; Therapy: 29ORW2268 to (03 17 74 30 53)  Requested for: 21YKD5600; Last  Rx:09Tkx1211 Ordered   20  PredniSONE 20 MG Oral Tablet; TAKE 2 TABLETS DAILY WITH FOOD AS DIRECTED;   Therapy: 98MYH6871 to (Evaluate:65Yfq8713)  Requested for: 30MHR7014; Last Rx: 64EFW8990 Ordered   21  SLPG Magic Mouthwash 1:1:1 maalox/diphenhydramine/lidocaine; 15 ml PO q6 prn; Therapy: 78GLY6216 to (Evaluate:94Zll8854); Last Rx:94Bqb1878 Ordered   22  TraMADol HCl - 50 MG Oral Tablet; TAKE 1 TABLET EVERY 6 HOURS AS NEEDED FOR  ONGOING PAIN;  Therapy: 05YEN6838 to (Last Rx:54Cka6962) Ordered  Medication List Reviewed: The medication list was reviewed and updated today  Allergies    1  Heparin    Vitals  Vital Signs    Recorded: 71INM7195 08:14AM   Temperature 98 6 F   Heart Rate 95   Respiration 18   Systolic 354   Diastolic 78   Height 6 ft    Weight 218 lb    BMI Calculated 29 57   BSA Calculated 2 21   O2 Saturation 96   Pain Scale 8       Physical Exam   Constitutional  General appearance: No acute distress, well appearing and well nourished  Eyes  Conjunctiva and lids: No swelling, erythema, or discharge  Ears, Nose, Mouth, and Throat  External inspection of ears and nose: Normal    Oropharynx: Normal with no erythema, edema, exudate or lesions  Pulmonary  Respiratory effort: No increased work of breathing or signs of respiratory distress  Auscultation of lungs: Clear to auscultation, equal breath sounds bilaterally, no wheezes, no rales, no rhonci  Cardiovascular  Auscultation of heart: Normal rate and rhythm, normal S1 and S2, without murmurs  Examination of extremities for edema and/or varicosities: Normal    Abdomen  Abdomen: Non-tender, no masses  Lymphatic  Palpation of lymph nodes in neck: No lymphadenopathy  Musculoskeletal  Gait and station: Normal    Skin  Skin and subcutaneous tissue: Abnormal  -- small bumps under skin throughout scalp, no evidence of acute inflammation/infection  Neurologic  Cranial nerves: Cranial nerves 2-12 intact     Psychiatric  Orientation to person, place and time: Normal    Mood and affect: Normal         ECOG 2       Results/Data  (1) CBC/PLT/DIFF 85MYT8796 08:30AM Qritiqr     Test Name Result Flag Reference   WBC COUNT 3 50 Thousand/uL L 4 31-10 16   WBC ADJUSTED 3 50 Thousand/uL L 4 31-10 16   RBC COUNT 4 72 Million/uL  3 88-5 62   HEMOGLOBIN 13 2 g/dL  12 0-17 0   HEMATOCRIT 40 8 %  36 5-49 3   MCV 87 fL  82-98   MCH 28 0 pg  26 8-34 3   MCHC 32 3 g/dL  31 4-37 4   RDW 15 2 % H 11 6-15 1   MPV 8 5 fL L 8 9-12 7   PLATELET COUNT 30 Thousands/uL -390     (1) COMPREHENSIVE METABOLIC PANEL 59TTX2102 74:29IP Lyndsay Diaz     Test Name Result Flag Reference   GLUCOSE,RANDM 124 mg/dL       If the patient is fasting, the ADA then defines impaired fasting glucose as > 100 mg/dL and diabetes as > or equal to 123 mg/dL  Specimen collection should occur prior to Sulfasalazine administration due to the potential for falsely depressed results  Specimen collection should occur prior to Sulfapyridine administration due to the potential for falsely elevated results  SODIUM 142 mmol/L  136-145   POTASSIUM 3 9 mmol/L  3 5-5 3   CHLORIDE 104 mmol/L     CARBON DIOXIDE 28 mmol/L  21-32   ANION GAP (CALC) 10 mmol/L  4-13   BLOOD UREA NITROGEN 21 mg/dL  5-25   CREATININE 1 20 mg/dL  0 60-1 30   Standardized to IDMS reference method   CALCIUM 9 2 mg/dL  8 3-10 1   BILI, TOTAL 0 80 mg/dL  0 20-1 00   ALK PHOSPHATAS 54 U/L     ALT (SGPT) 21 U/L  12-78   Specimen collection should occur prior to Sulfasalazine administration due to the potential for falsely depressed results  AST(SGOT) 31 U/L  5-45   Specimen collection should occur prior to Sulfasalazine administration due to the potential for falsely depressed results  ALBUMIN 3 9 g/dL  3 2-5 0   TOTAL PROTEIN 6 1 g/dL L 6 4-8 2   eGFR 64 ml/min/1 73sq m       National Kidney Disease Education Program recommendations are as follows: GFR calculation is accurate only with a steady state creatinine Chronic Kidney disease less than 60 ml/min/1 73 sq  meters Kidney failure less than 15 ml/min/1 73 sq  meters       (1) CBC/PLT/DIFF 67UMX8044 08:30AM Skip Lucio     Test Name Result Flag Reference   TOTAL COUNTED 100     Ovalocytosis Present     PLT ESTIMATE Decreased A Adequate   SEGS %(DIFF) 68 %  43-75   BAND %(DIFF) 2 %  0-8   LYMPHOCYTES %(DIFF) 16 %  14-44   MONOCYTES %(DIFF) 8 %  4-12   EOSINOPHILS %(DIFF) 6 %  0-6   BASOPHILS %(DIFF) 0 %  0-1   NEUTROPHILS ABS CT 2 45 Thousand/uL  1 81-6 82   LYMPHOCYTES ABS CT 0 56 Thousand/uL L 0 60-4 47   MONOCYTES ABS CT 0 28 Thousand/uL  0 00-1 22   EOSINOPHILS ABS CT 0 21 Thousand/uL  0 00-0 61   BASOPHILS ABS CT 0 00 Thousand/uL  0 00-0 10     (1) BILIRUBIN, DIRECT 22KMX2056 08:30AM HeavilonEstellaLyndsay     Test Name Result Flag Reference   BILI, DIRECT 0 14 mg/dL  0 00-0 20     (1) RETIC WITH RETICULOCYTE HGB 84OXH2149 08:30AM Heavilon, Lyndsay     Test Name Result Flag Reference   IRF 2 6 %  0 0-14 0   RETIC % 1 34 %  0 37-1 87   RETIC ABS # [id number]  [id number]   RETHE 28 6 pg L 30 0-38 3     Health Management  Esophagitis, reflux   EGD; every 3 months; Last 33GEQ6742; Next Due: 51MXT7447; Active    Future Appointments    Date/Time Provider Specialty Site   01/24/2018 01:20 PM Abida Fletcher DO Gastroenterology Adult [facility]   01/18/2018 01:00 PM TYSHAWN Tang   Endocrinology [facility] ENDOCRINOLOGY   12/27/2017 08:45 AM Geovanni Mejia MD Hematology Oncology [facility]       Signatures   Electronically signed by : Nevaeh Nance, [facility]; Dec  6 2017 11:43AM EST                       (Author)    Electronically signed by : Ivette Rendon MD; Dec  7 2017  7:13AM EST                       (Author)    Electronically signed by : Ivette Rendon MD; Dec  7 2017  7:13AM EST                       (Author)

## 2017-12-11 ENCOUNTER — GENERIC CONVERSION - ENCOUNTER (OUTPATIENT)
Dept: OTHER | Facility: OTHER | Age: 62
End: 2017-12-11

## 2017-12-14 ENCOUNTER — HOSPITAL ENCOUNTER (OUTPATIENT)
Dept: INFUSION CENTER | Facility: CLINIC | Age: 62
Discharge: HOME | End: 2017-12-14
Payer: MEDICARE

## 2017-12-14 LAB
ALBUMIN SERPL BCP-MCNC: 3.9 G/DL (ref 3.2–5)
ALP SERPL-CCNC: 47 U/L (ref 46–116)
ALT SERPL W P-5'-P-CCNC: 22 U/L (ref 12–78)
ANION GAP SERPL CALCULATED.3IONS-SCNC: 12 MMOL/L (ref 4–13)
ANISOCYTOSIS BLD QL SMEAR: PRESENT
AST SERPL W P-5'-P-CCNC: 29 U/L (ref 5–45)
BASOPHILS # BLD AUTO: 0.08 THOUSAND/UL (ref 0–0.1)
BASOPHILS NFR MAR MANUAL: 3 % (ref 0–1)
BILIRUB DIRECT SERPL-MCNC: 0.16 MG/DL (ref 0–0.2)
BILIRUB SERPL-MCNC: 0.8 MG/DL (ref 0.2–1)
BUN SERPL-MCNC: 18 MG/DL (ref 5–25)
CALCIUM SERPL-MCNC: 9.7 MG/DL (ref 8.3–10.1)
CHLORIDE SERPL-SCNC: 105 MMOL/L (ref 98–108)
CO2 SERPL-SCNC: 27 MMOL/L (ref 21–32)
CREAT SERPL-MCNC: 1 MG/DL (ref 0.6–1.3)
EOSINOPHIL # BLD AUTO: 0.19 THOUSAND/UL (ref 0–0.61)
EOSINOPHIL NFR BLD MANUAL: 7 % (ref 0–6)
ERYTHROCYTE [DISTWIDTH] IN BLOOD BY AUTOMATED COUNT: 14.4 % (ref 11.6–15.1)
GFR SERPL CREATININE-BSD FRML MDRD: 80 ML/MIN/1.73SQ M
GLUCOSE SERPL-MCNC: 125 MG/DL (ref 65–140)
HCT VFR BLD AUTO: 37 % (ref 36.5–49.3)
HGB BLD-MCNC: 12.4 G/DL (ref 12–17)
LYMPHOCYTES # BLD AUTO: 0.59 THOUSAND/UL (ref 0.6–4.47)
LYMPHOCYTES # BLD AUTO: 22 % (ref 14–44)
MCH RBC QN AUTO: 28.5 PG (ref 26.8–34.3)
MCHC RBC AUTO-ENTMCNC: 33.5 G/DL (ref 31.4–37.4)
MCV RBC AUTO: 85 FL (ref 82–98)
MONOCYTES # BLD AUTO: 0.24 THOUSAND/UL (ref 0–1.22)
MONOCYTES NFR BLD AUTO: 9 % (ref 4–12)
NEUTS BAND NFR BLD MANUAL: 3 % (ref 0–8)
NEUTS SEG # BLD: 1.59 THOUSAND/UL (ref 1.81–6.82)
NEUTS SEG NFR BLD AUTO: 56 % (ref 43–75)
OVALOCYTES BLD QL SMEAR: PRESENT
PLATELET # BLD AUTO: 85 THOUSANDS/UL (ref 149–390)
PLATELET BLD QL SMEAR: ABNORMAL
PMV BLD AUTO: 7.5 FL (ref 8.9–12.7)
POTASSIUM SERPL-SCNC: 4 MMOL/L (ref 3.5–5.3)
PROT SERPL-MCNC: 6 G/DL (ref 6.4–8.2)
RBC # BLD AUTO: 4.34 MILLION/UL (ref 3.88–5.62)
RETICS # AUTO: NORMAL 10*3/UL (ref 14356–105094)
RETICS # CALC: 1.75 % (ref 0.37–1.87)
SODIUM SERPL-SCNC: 144 MMOL/L (ref 136–145)
TOTAL CELLS COUNTED SPEC: 100
WBC # BLD AUTO: 2.7 THOUSAND/UL (ref 4.31–10.16)
WBC NRBC COR # BLD: 2.7 THOUSAND/UL (ref 4.31–10.16)

## 2017-12-14 PROCEDURE — 85045 AUTOMATED RETICULOCYTE COUNT: CPT | Performed by: INTERNAL MEDICINE

## 2017-12-14 PROCEDURE — 85007 BL SMEAR W/DIFF WBC COUNT: CPT | Performed by: INTERNAL MEDICINE

## 2017-12-14 PROCEDURE — 82248 BILIRUBIN DIRECT: CPT | Performed by: INTERNAL MEDICINE

## 2017-12-14 PROCEDURE — 80053 COMPREHEN METABOLIC PANEL: CPT | Performed by: INTERNAL MEDICINE

## 2017-12-14 PROCEDURE — 85027 COMPLETE CBC AUTOMATED: CPT | Performed by: INTERNAL MEDICINE

## 2017-12-14 NOTE — PLAN OF CARE
Problem: Potential for Falls  Goal: Patient will remain free of falls  INTERVENTIONS:  - Assess patient frequently for physical needs  -  Identify cognitive and physical deficits and behaviors that affect risk of falls    -  [place] fall precautions as indicated by assessment   - Educate patient/family on patient safety including physical limitations  - Instruct patient to call for assistance with activity based on assessment  - Modify environment to reduce risk of injury  - Consider OT/PT consult to assist with strengthening/mobility   Outcome: Progressing

## 2017-12-14 NOTE — PROGRESS NOTES
Blood work collected via port a cath  Port flushed per protocol  Pt is aware of all future appointments   Refused AVS

## 2017-12-18 ENCOUNTER — HOSPITAL ENCOUNTER (OUTPATIENT)
Dept: INFUSION CENTER | Facility: CLINIC | Age: 62
Discharge: HOME | End: 2017-12-18
Payer: MEDICARE

## 2017-12-18 DIAGNOSIS — E09.9 DRUG OR CHEMICAL INDUCED DIABETES MELLITUS WITHOUT COMPLICATIONS (HCC): ICD-10-CM

## 2017-12-18 DIAGNOSIS — C91.10 CHRONIC LYMPHOCYTIC LEUKEMIA OF B-CELL TYPE NOT HAVING ACHIEVED REMISSION (HCC): ICD-10-CM

## 2017-12-18 LAB
ALBUMIN SERPL BCP-MCNC: 4.1 G/DL (ref 3.2–5)
ALP SERPL-CCNC: 43 U/L (ref 46–116)
ALT SERPL W P-5'-P-CCNC: 24 U/L (ref 12–78)
ANION GAP SERPL CALCULATED.3IONS-SCNC: 11 MMOL/L (ref 4–13)
AST SERPL W P-5'-P-CCNC: 30 U/L (ref 5–45)
BASOPHILS # BLD AUTO: 0.09 THOUSAND/UL (ref 0–0.1)
BASOPHILS NFR MAR MANUAL: 3 % (ref 0–1)
BILIRUB DIRECT SERPL-MCNC: 0.18 MG/DL (ref 0–0.2)
BILIRUB SERPL-MCNC: 1 MG/DL (ref 0.2–1)
BUN SERPL-MCNC: 21 MG/DL (ref 5–25)
CALCIUM SERPL-MCNC: 9.1 MG/DL (ref 8.3–10.1)
CHLORIDE SERPL-SCNC: 105 MMOL/L (ref 98–108)
CO2 SERPL-SCNC: 27 MMOL/L (ref 21–32)
CREAT SERPL-MCNC: 1 MG/DL (ref 0.6–1.3)
EOSINOPHIL # BLD AUTO: 0.24 THOUSAND/UL (ref 0–0.61)
EOSINOPHIL NFR BLD MANUAL: 8 % (ref 0–6)
ERYTHROCYTE [DISTWIDTH] IN BLOOD BY AUTOMATED COUNT: 14.5 % (ref 11.6–15.1)
GFR SERPL CREATININE-BSD FRML MDRD: 80 ML/MIN/1.73SQ M
GLUCOSE SERPL-MCNC: 121 MG/DL (ref 65–140)
HCT VFR BLD AUTO: 40.5 % (ref 36.5–49.3)
HGB BLD-MCNC: 12.7 G/DL (ref 12–17)
IGG SERPL-MCNC: 113 MG/DL (ref 700–1600)
LDH SERPL-CCNC: 261 U/L (ref 81–234)
LG PLATELETS BLD QL SMEAR: PRESENT
LYMPHOCYTES # BLD AUTO: 0.81 THOUSAND/UL (ref 0.6–4.47)
LYMPHOCYTES # BLD AUTO: 27 % (ref 14–44)
MCH RBC QN AUTO: 26.9 PG (ref 26.8–34.3)
MCHC RBC AUTO-ENTMCNC: 31.5 G/DL (ref 31.4–37.4)
MCV RBC AUTO: 85 FL (ref 82–98)
METAMYELOCYTES NFR BLD MANUAL: 1 % (ref 0–1)
MONOCYTES # BLD AUTO: 0.21 THOUSAND/UL (ref 0–1.22)
MONOCYTES NFR BLD AUTO: 7 % (ref 4–12)
NEUTS BAND NFR BLD MANUAL: 2 % (ref 0–8)
NEUTS SEG # BLD: 1.56 THOUSAND/UL (ref 1.81–6.82)
NEUTS SEG NFR BLD AUTO: 50 % (ref 43–75)
OVALOCYTES BLD QL SMEAR: PRESENT
PLATELET # BLD AUTO: 82 THOUSANDS/UL (ref 149–390)
PLATELET BLD QL SMEAR: ABNORMAL
PMV BLD AUTO: 8.9 FL (ref 8.9–12.7)
POTASSIUM SERPL-SCNC: 4.2 MMOL/L (ref 3.5–5.3)
PROT SERPL-MCNC: 6 G/DL (ref 6.4–8.2)
RBC # BLD AUTO: 4.74 MILLION/UL (ref 3.88–5.62)
RETICS # AUTO: NORMAL 10*3/UL (ref 14356–105094)
RETICS # CALC: 1.84 % (ref 0.37–1.87)
SODIUM SERPL-SCNC: 143 MMOL/L (ref 136–145)
TOTAL CELLS COUNTED SPEC: 100
URATE SERPL-MCNC: 4.7 MG/DL (ref 4.2–8)
VARIANT LYMPHS # BLD AUTO: 2 % (ref 0–0)
WBC # BLD AUTO: 3 THOUSAND/UL (ref 4.31–10.16)
WBC NRBC COR # BLD: 3 THOUSAND/UL (ref 4.31–10.16)

## 2017-12-18 PROCEDURE — 82784 ASSAY IGA/IGD/IGG/IGM EACH: CPT | Performed by: INTERNAL MEDICINE

## 2017-12-18 PROCEDURE — 85027 COMPLETE CBC AUTOMATED: CPT | Performed by: INTERNAL MEDICINE

## 2017-12-18 PROCEDURE — 82248 BILIRUBIN DIRECT: CPT | Performed by: INTERNAL MEDICINE

## 2017-12-18 PROCEDURE — 82232 ASSAY OF BETA-2 PROTEIN: CPT | Performed by: INTERNAL MEDICINE

## 2017-12-18 PROCEDURE — 83615 LACTATE (LD) (LDH) ENZYME: CPT | Performed by: INTERNAL MEDICINE

## 2017-12-18 PROCEDURE — 85007 BL SMEAR W/DIFF WBC COUNT: CPT | Performed by: INTERNAL MEDICINE

## 2017-12-18 PROCEDURE — 80053 COMPREHEN METABOLIC PANEL: CPT | Performed by: INTERNAL MEDICINE

## 2017-12-18 PROCEDURE — 85045 AUTOMATED RETICULOCYTE COUNT: CPT | Performed by: INTERNAL MEDICINE

## 2017-12-18 PROCEDURE — 84550 ASSAY OF BLOOD/URIC ACID: CPT | Performed by: INTERNAL MEDICINE

## 2017-12-18 RX ORDER — ACETAMINOPHEN 325 MG/1
650 TABLET ORAL ONCE
Status: COMPLETED | OUTPATIENT
Start: 2017-12-19 | End: 2017-12-19

## 2017-12-18 RX ORDER — SODIUM CHLORIDE 9 MG/ML
20 INJECTION, SOLUTION INTRAVENOUS CONTINUOUS
Status: DISCONTINUED | OUTPATIENT
Start: 2017-12-19 | End: 2017-12-22 | Stop reason: HOSPADM

## 2017-12-18 NOTE — PLAN OF CARE
Problem: Potential for Falls  Goal: Patient will remain free of falls  INTERVENTIONS:  - Assess patient frequently for physical needs  -  Identify cognitive and physical deficits and behaviors that affect risk of falls    -  [place] fall precautions as indicated by assessment   - Educate patient/family on patient safety including physical limitations  - Instruct patient to call for assistance with activity based on assessment  - Modify environment to reduce risk of injury  - Consider OT/PT consult to assist with strengthening/mobility   Outcome: Progressing

## 2017-12-19 ENCOUNTER — HOSPITAL ENCOUNTER (OUTPATIENT)
Dept: INFUSION CENTER | Facility: CLINIC | Age: 62
Discharge: HOME | End: 2017-12-21
Payer: MEDICARE

## 2017-12-19 VITALS
TEMPERATURE: 98.4 F | DIASTOLIC BLOOD PRESSURE: 76 MMHG | HEART RATE: 83 BPM | SYSTOLIC BLOOD PRESSURE: 127 MMHG | RESPIRATION RATE: 20 BRPM

## 2017-12-19 LAB — B2 MICROGLOB SERPL-MCNC: 2.6 MG/L (ref 0.6–2.4)

## 2017-12-19 PROCEDURE — 96367 TX/PROPH/DG ADDL SEQ IV INF: CPT

## 2017-12-19 PROCEDURE — 96413 CHEMO IV INFUSION 1 HR: CPT

## 2017-12-19 PROCEDURE — 96415 CHEMO IV INFUSION ADDL HR: CPT

## 2017-12-19 RX ORDER — OXYCODONE HYDROCHLORIDE 15 MG/1
10 TABLET, FILM COATED, EXTENDED RELEASE ORAL EVERY 12 HOURS PRN
COMMUNITY
End: 2018-01-16 | Stop reason: ALTCHOICE

## 2017-12-19 RX ADMIN — DEXAMETHASONE SODIUM PHOSPHATE 20 MG: 10 INJECTION, SOLUTION INTRAMUSCULAR; INTRAVENOUS at 08:21

## 2017-12-19 RX ADMIN — OBINUTUZUMAB 1000 MG: 1000 INJECTION, SOLUTION, CONCENTRATE INTRAVENOUS at 09:30

## 2017-12-19 RX ADMIN — ACETAMINOPHEN 650 MG: 325 TABLET, FILM COATED ORAL at 08:32

## 2017-12-19 RX ADMIN — SODIUM CHLORIDE 20 ML/HR: 0.9 INJECTION, SOLUTION INTRAVENOUS at 08:21

## 2017-12-19 RX ADMIN — DIPHENHYDRAMINE HYDROCHLORIDE 25 MG: 50 INJECTION, SOLUTION INTRAMUSCULAR; INTRAVENOUS at 08:48

## 2017-12-19 NOTE — PLAN OF CARE
Problem: Potential for Falls  Goal: Patient will remain free of falls  INTERVENTIONS:  - Assess patient frequently for physical needs  -  Identify cognitive and physical deficits and behaviors that affect risk of falls    -  [place] fall precautions as indicated by assessment   - Educate patient/family on patient safety including physical limitations  - Instruct patient to call for assistance with activity based on assessment  - Modify environment to reduce risk of injury  - Consider OT/PT consult to assist with strengthening/mobility   Outcome: Progressing

## 2017-12-19 NOTE — PROGRESS NOTES
Pt without complaint, tolerated [place] without adverse reaction, declines AVS today, left unit in stable condition

## 2017-12-26 ENCOUNTER — HOSPITAL ENCOUNTER (OUTPATIENT)
Dept: INFUSION CENTER | Facility: CLINIC | Age: 62
Discharge: HOME | End: 2017-12-28
Payer: MEDICARE

## 2017-12-26 LAB
ALBUMIN SERPL BCP-MCNC: 4 G/DL (ref 3.2–5)
ALP SERPL-CCNC: 47 U/L (ref 46–116)
ALT SERPL W P-5'-P-CCNC: 20 U/L (ref 12–78)
ANION GAP SERPL CALCULATED.3IONS-SCNC: 8 MMOL/L (ref 4–13)
ANISOCYTOSIS BLD QL SMEAR: PRESENT
AST SERPL W P-5'-P-CCNC: 26 U/L (ref 5–45)
BASOPHILS # BLD AUTO: 0 THOUSAND/UL (ref 0–0.1)
BASOPHILS NFR MAR MANUAL: 0 % (ref 0–1)
BILIRUB DIRECT SERPL-MCNC: 0.2 MG/DL (ref 0–0.2)
BILIRUB SERPL-MCNC: 0.9 MG/DL (ref 0.2–1)
BUN SERPL-MCNC: 22 MG/DL (ref 5–25)
CALCIUM SERPL-MCNC: 9.8 MG/DL (ref 8.3–10.1)
CHLORIDE SERPL-SCNC: 103 MMOL/L (ref 98–108)
CO2 SERPL-SCNC: 29 MMOL/L (ref 21–32)
CREAT SERPL-MCNC: 1 MG/DL (ref 0.6–1.3)
EOSINOPHIL # BLD AUTO: 0.37 THOUSAND/UL (ref 0–0.61)
EOSINOPHIL NFR BLD MANUAL: 12 % (ref 0–6)
ERYTHROCYTE [DISTWIDTH] IN BLOOD BY AUTOMATED COUNT: 15.3 % (ref 11.6–15.1)
GFR SERPL CREATININE-BSD FRML MDRD: 80 ML/MIN/1.73SQ M
GLUCOSE SERPL-MCNC: 121 MG/DL (ref 65–140)
HCT VFR BLD AUTO: 42.6 % (ref 36.5–49.3)
HGB BLD-MCNC: 14.2 G/DL (ref 12–17)
LYMPHOCYTES # BLD AUTO: 0.5 THOUSAND/UL (ref 0.6–4.47)
LYMPHOCYTES # BLD AUTO: 16 % (ref 14–44)
MCH RBC QN AUTO: 28.5 PG (ref 26.8–34.3)
MCHC RBC AUTO-ENTMCNC: 33.3 G/DL (ref 31.4–37.4)
MCV RBC AUTO: 86 FL (ref 82–98)
MONOCYTES # BLD AUTO: 0.25 THOUSAND/UL (ref 0–1.22)
MONOCYTES NFR BLD AUTO: 8 % (ref 4–12)
NEUTS BAND NFR BLD MANUAL: 2 % (ref 0–8)
NEUTS SEG # BLD: 1.98 THOUSAND/UL (ref 1.81–6.82)
NEUTS SEG NFR BLD AUTO: 62 % (ref 43–75)
OVALOCYTES BLD QL SMEAR: PRESENT
PLATELET # BLD AUTO: 49 THOUSANDS/UL (ref 149–390)
PLATELET BLD QL SMEAR: ABNORMAL
POTASSIUM SERPL-SCNC: 3.9 MMOL/L (ref 3.5–5.3)
PROT SERPL-MCNC: 5.8 G/DL (ref 6.4–8.2)
RBC # BLD AUTO: 4.97 MILLION/UL (ref 3.88–5.62)
RETICS # AUTO: ABNORMAL 10*3/UL (ref 14356–105094)
RETICS # CALC: 2.92 % (ref 0.37–1.87)
SODIUM SERPL-SCNC: 140 MMOL/L (ref 136–145)
TOTAL CELLS COUNTED SPEC: 100
WBC # BLD AUTO: 3.1 THOUSAND/UL (ref 4.31–10.16)
WBC NRBC COR # BLD: 3.1 THOUSAND/UL (ref 4.31–10.16)

## 2017-12-26 PROCEDURE — 80053 COMPREHEN METABOLIC PANEL: CPT | Performed by: INTERNAL MEDICINE

## 2017-12-26 PROCEDURE — 85027 COMPLETE CBC AUTOMATED: CPT | Performed by: INTERNAL MEDICINE

## 2017-12-26 PROCEDURE — 82248 BILIRUBIN DIRECT: CPT | Performed by: INTERNAL MEDICINE

## 2017-12-26 PROCEDURE — 85045 AUTOMATED RETICULOCYTE COUNT: CPT | Performed by: INTERNAL MEDICINE

## 2017-12-26 PROCEDURE — 85007 BL SMEAR W/DIFF WBC COUNT: CPT | Performed by: INTERNAL MEDICINE

## 2017-12-26 NOTE — PROGRESS NOTES
Labs drawn from port a cath    Port flushed per protocol with saline only due to pt's heparin allergy

## 2017-12-26 NOTE — PROGRESS NOTES
Maren RN covering for Dr Mamta Alexander, was notified of platelets of [phone]  No new orders at this time

## 2017-12-26 NOTE — PLAN OF CARE
Problem: Potential for Falls  Goal: Patient will remain free of falls  INTERVENTIONS:  - Assess patient frequently for physical needs  -  Identify cognitive and physical deficits and behaviors that affect risk of falls    -  [place] fall precautions as indicated by assessment   - Educate patient/family on patient safety including physical limitations  - Instruct patient to call for assistance with activity based on assessment  - Modify environment to reduce risk of injury  - Consider OT/PT consult to assist with strengthening/mobility   Outcome: Progressing

## 2017-12-27 ENCOUNTER — ALLSCRIPTS OFFICE VISIT (OUTPATIENT)
Dept: OTHER | Facility: OTHER | Age: 62
End: 2017-12-27

## 2017-12-28 NOTE — PROGRESS NOTES
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,T38 0X5A)    Plan   Chronic lymphocytic leukemia    · Acyclovir 400 MG Oral Tablet; 1 tab po bid   Rx By: Cornelio Other; Dispense: 0 Days ; #:60 Tablet; Refill: 2;For: Chronic lymphocytic leukemia; SHERON = N; Verified Transmission to [facility]Y #[id number] Last Updated By: [employer]; 12/27/2017 8:51:49 AM   · (1) CBC/PLT/DIFF; Status:Active; Requested for:30Icd6594;    Perform:[facility]; MXK:[id number]; Ordered; For:Chronic lymphocytic leukemia; Ordered By:Gideon Mejia;   · Follow-up visit in 1 month Evaluation and Treatment  Follow-up  Status: Complete  Done:    59VXG9761 08:30AM   Ordered; For: Chronic lymphocytic leukemia; Ordered By: Cornelio Villa Performed:  Due: 79RSL7150; Last Updated By: Lorin Gutierrez; 12/27/2017 8:53:03 AM    Discussion/Summary   Discussion Summary:    Follow-up visit for 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 and patient has been on ibrutinib 280 mg daily, gazyva, prednisone and folic acid in addition to his other medications  There was question of herpes zoster on his scalp  He does not have headaches anymore and no scalp lesion  He will be started on prophylactic acyclovir to prevent recurrence of herpes zoster  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  Prednisone dose will be lowered  He has generalized weakness  He has chronic lung disease but has less wheezing now  He has pancytopenia   Yesterday platelet count dropped to 49,000  Blood counts will be checked again this week  Ibrutinib dose has been decreased to 140 mg daily temporarily    Thrombocytopenia could have been secondary to gazyva that he received last week   Presently no fever, chills or bleeding  Patient was on venetoclax prior to ibrutinib but then disease progressed and treatment was changed  He has a long-standing history of CLL  He has chronic lung disease and has exertional dyspnea  He has nonproductive cough  He has generalized weakness and tiredness  He has vascular purpura on his forearms  He has joint pains  No more muscle cramps  was diagnosed in 2001 and treated with FCR  He developed increased hemolysis on FCR  Treatment was changed to PCR  He improved  His most recent chemotherapy treatment was Cytoxan, Oncovin, prednisone and Rituxan and last treatment was in December 2012    In 2013 he was started on Rituxan, prednisone and folic acid because of autoimmune hemolytic anemia  IVIG was tried unsuccessfully  Rituxan controlled the hemolysis but he had reaction to Rituxan ? He had shaking chills and hyperventilation  He was taken to the emergency room, had cultures and was sent home  In January 2017 he was hospitalized with viral esophagitis and he received intravenous acyclovir at home  Prednisone dose is down to 5 mg a day  He is on folic acid  also has history of heparin induced thrombocytopenia and he does not flush Port-A-Cath with heparin  3/20/17 patient found to have hemoglobin of 6 2, ,000  He was admitted to [place] [address] for blood transfusion and was transferred to [address] [place] [address]  3/20/17 WBC count 195,000, hemoglobin 4 9, platelet count 65  Direct coomb's was positive with undetectable haptoglobin  He was given 2 units of PRBCs, Solu-Medrol 1 g Ã3 days and IVIG 1 g/kg daily Ã3 days  His hemoglobin continued to drop  Bone marrow biopsy on 3/21 showed marrow cellularity of greater than 95% almost replaced by small lymphocytes, lambda light chain restricted, CD20 positive, CD19 positive, CD23 positive partially expressing CD11c and CD25 and CD5 positive and negative for CD 79 and CD38   was treated with single dose of chlorambucil to control his CLL  second dose of chlorambucil, also Rituxan 375 mg/M Â² for autoimmune hemolytic anemia  WBC continued to rise, 266,000  initiated with Venetoclax 20 mg daily  Tumor lysis monitored every 8 hours; given aggressive hydration and Lasix and remained euvolemic  dropped to 112,000 WBC 63,000 15,000, , platelet count 84,063  Patient became febrile, 101 3  The cefepime initiated Venetoclax held  fell from bed, CT head negative  ANC 1200, cefepime discontinued and Levaquin 75 mg daily started sliding 3/29 given second dose of rituximab 375 mg/m Â²  meg to 14 5 thousand, platelets 99,761, Venetoclax restarted 10 mg every other day WBC 4 4, , platelet count 63,799  WBC maintained less than 10,000, ANC and platelet count meg  He was discharged on Venetoclax 10 mg every other day  He was instructed to discontinue simvastatin, Ranexa, aspirin, metoprolol 50 mg q  day, losartan 50 mg q  day, Plavix 75 mg q  day, folic acid 733 Âµg b i d , prednisone 60 mg, allopurinol  was advised to continue Levaquin 750 mg daily for 10 days, Lexapro 10 mg daily, fenofibrate 50 mg daily, gabapentin 100 mg twice daily, Protonix 40 mg daily   studies performed at [facility]: had echocardiogram while inpatient at [facility] on 3/21  This study was unremarkable  chest abdomen pelvis without contrast on 3/24 showed stable pulmonary nodules, patchy groundglass densities of lower lobes previously identified and which may represent volume loss or early infiltrate  Persistent diffuse bronchial wall thickening suggesting acute/chronic bronchitis changes  No bronchiectasis  No masses  Stable lymphadenopathy of mediastinum  Stable axillary lymphadenopathy  Splenomegaly 23 9 cm  Extensive lymphadenopathy throughout the entire retroperitoneal, small bowel mesentery, and pelvis  Largest lymph node in right lower quadrant measured 4 6 x 4 8  Stable enlarged left para-aortic lymph node measuring 6 2 x 6 8   Patient received one dose of Rituxan on 4/7/17  Venetoclax 10 mg every other day 4/5/17 - 4/9/17  Venetoclax 10 mg every day beginning 4/10/17  Monitoring CBC 3 times per week  patient had follow-up appointment at [facility] with Dr Gabbi José  She recommended to slowly increase dose of veneteclax  Also, she recommended as he has had numerous treatments with Rituxan, if antibody directed therapy becomes indicated in the future recommendation was with [facility]  She will be seeing her on a p r n  basis  Physical examination and test results are as recorded and discussed  Plan is as above  Blood counts will be checked again tomorrow or day after because of grade 3 thrombocytopenia  Prednisone dose is being lower to 10 milligram a day  To adjust the dose of ibrutinib depending upon the blood counts  Restarting acyclovir as above  Counseling Documentation With Imm: The patient, patient's family was counseled regarding diagnostic results,-- instructions for management,-- risk factor reductions,-- prognosis,-- patient and family education,-- impressions,-- importance of compliance with treatment  total time of encounter was 30 minutes-- and-- 20 minutes was spent counseling  Goals and Barriers: The patient has the current Goals: Treatment of CLL with autoimmune hemolytic anemia  The patent has the current Barriers: Limited options  Patient's Capacity to Self-Care: Patient is able to Self-Care  Medication SE Review and Pt Understands Tx: Possible side effects of new medications were reviewed with the patient/guardian today  The treatment plan was reviewed with the patient/guardian  The patient/guardian understands and agrees with the treatment plan    Self Referrals:    Self Referrals: No    Understands and agrees with treatment plan: The treatment plan was reviewed with the patient/guardian   The patient/guardian understands and agrees with the treatment plan      Chief Complaint   Chief Complaint: Chief Complaint:      The patient presents to the office today with CLL with hemolytic anemia  Diabetes mellitus with very high blood sugar  History of Present Illness   HPI: Follow-up visit for 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 and patient has been on ibrutinib 280 mg daily, gazyva, prednisone and folic acid in addition to his other medications  There was question of herpes zoster on his scalp  He does not have headaches anymore and no scalp lesion  He will be started on prophylactic acyclovir to prevent recurrence of herpes zoster  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  Prednisone dose will be lowered  He has generalized weakness  He has chronic lung disease but has less wheezing now  He has pancytopenia   Yesterday platelet count dropped to 49,000  Blood counts will be checked again this week  Ibrutinib dose has been decreased to 140 mg daily temporarily    Thrombocytopenia could have been secondary to gazyva that he received last week  Presently no fever, chills or bleeding  Patient was on venetoclax prior to ibrutinib but then disease progressed and treatment was changed  He has a long-standing history of CLL  He has chronic lung disease and has exertional dyspnea  He has nonproductive cough  He has generalized weakness and tiredness  He has vascular purpura on his forearms  He has joint pains  No more muscle cramps  was diagnosed in 2001 and treated with FCR  He developed increased hemolysis on FCR  Treatment was changed to PCR  He improved  His most recent chemotherapy treatment was Cytoxan, Oncovin, prednisone and Rituxan and last treatment was in December 2012    In 2013 he was started on Rituxan, prednisone and folic acid because of autoimmune hemolytic anemia  IVIG was tried unsuccessfully  Rituxan controlled the hemolysis but he had reaction to Rituxan ? He had shaking chills and hyperventilation   He was taken to the emergency room, had cultures and was sent home  In January 2017 he was hospitalized with viral esophagitis and he received intravenous acyclovir at home  Prednisone dose is down to 5 mg a day  He is on folic acid  also has history of heparin induced thrombocytopenia and he does not flush Port-A-Cath with heparin  3/20/17 patient found to have hemoglobin of 6 2, ,000  He was admitted to [address] [place] [address] for blood transfusion and was transferred to [address] [place] [address]  3/20/17 WBC count 195,000, hemoglobin 4 9, platelet count 65  Direct coomb's was positive with undetectable haptoglobin  He was given 2 units of PRBCs, Solu-Medrol 1 g Ã3 days and IVIG 1 g/kg daily Ã3 days  His hemoglobin continued to drop  Bone marrow biopsy on 3/21 showed marrow cellularity of greater than 95% almost replaced by small lymphocytes, lambda light chain restricted, CD20 positive, CD19 positive, CD23 positive partially expressing CD11c and CD25 and CD5 positive and negative for CD 79 and CD38  was treated with single dose of chlorambucil to control his CLL  second dose of chlorambucil, also Rituxan 375 mg/M Â² for autoimmune hemolytic anemia  WBC continued to rise, 266,000  initiated with Venetoclax 20 mg daily  Tumor lysis monitored every 8 hours; given aggressive hydration and Lasix and remained euvolemic  dropped to 112,000 WBC 63,000 15,000, , platelet count 09,229  Patient became febrile, 101 3  The cefepime initiated Venetoclax held  fell from bed, CT head negative  ANC 1200, cefepime discontinued and Levaquin 75 mg daily started sliding 3/29 given second dose of rituximab 375 mg/m Â²  meg to 14 5 thousand, platelets 79,016, Venetoclax restarted 10 mg every other day WBC 4 4, , platelet count 45,744  WBC maintained less than 10,000, ANC and platelet count meg  He was discharged on Venetoclax 10 mg every other day  He was instructed to discontinue simvastatin, Ranexa, aspirin, metoprolol 50 mg q  day, losartan 50 mg q  day, Plavix 75 mg q  day, folic acid 975 Âµg b i d , prednisone 60 mg, allopurinol  was advised to continue Levaquin 750 mg daily for 10 days, Lexapro 10 mg daily, fenofibrate 50 mg daily, gabapentin 100 mg twice daily, Protonix 40 mg daily   studies performed at [facility]: had echocardiogram while inpatient at [facility] on 3/21  This study was unremarkable  chest abdomen pelvis without contrast on 3/24 showed stable pulmonary nodules, patchy groundglass densities of lower lobes previously identified and which may represent volume loss or early infiltrate  Persistent diffuse bronchial wall thickening suggesting acute/chronic bronchitis changes  No bronchiectasis  No masses  Stable lymphadenopathy of mediastinum  Stable axillary lymphadenopathy  Splenomegaly 23 9 cm  Extensive lymphadenopathy throughout the entire retroperitoneal, small bowel mesentery, and pelvis  Largest lymph node in right lower quadrant measured 4 6 x 4 8  Stable enlarged left para-aortic lymph node measuring 6 2 x 6 8  Patient received one dose of Rituxan on 4/7/17  Venetoclax 10 mg every other day 4/5/17 - 4/9/17  Venetoclax 10 mg every day beginning 4/10/17  Monitoring CBC 3 times per week  patient had follow-up appointment at [facility] with Dr Taylor Larry  She recommended to slowly increase dose of veneteclax  Also, she recommended as he has had numerous treatments with Rituxan, if antibody directed therapy becomes indicated in the future recommendation was with [facility]  She will be seeing her on a p r n  basis  Review of Systems        Reviewed 13 systems  Other symptoms as in HPI  Complete-Male:      Constitutional: recent --Ulb weight gain-- and-- feeling tired, but-- no fever,-- not feeling poorly,-- no chills-- and-- no recent weight loss  Eyes: no eye pain,-- no eyesight problems,-- no dryness of the eyes,-- eyes not red,-- no purulent discharge from the eyes-- and-- no itching of the eyes  ENT: no complaints of earache, no hearing loss, no nosebleeds, no nasal discharge, no sore throat, no hoarseness  Cardiovascular: as noted in HPI,-- the heart rate was not slow,-- no chest pain,-- no intermittent leg claudication,-- the heart rate was not fast,-- no palpitations-- and-- no extremity edema  Respiratory: cough-- and-- shortness of breath during exertion, but-- no shortness of breath,-- no orthopnea,-- no wheezing-- and-- no PND  Gastrointestinal: No complaints of abdominal pain, no constipation, no nausea or vomiting, no diarrhea or bloody stools  Genitourinary: No complaints of dysuria, no incontinence, no hesitancy, no nocturia, no genital lesion, no testicular pain  Musculoskeletal: arthralgias-- and-- joint stiffness, but-- no joint swelling,-- no limb pain,-- no myalgias-- and-- no limb swelling  Integumentary: Vascular purpura on forearms, but-- No complaints of skin rash or skin lesions, no itching, no skin wound, no dry skin  Neurological: No compliants of headache, no confusion, no convulsions, no numbness or tingling, no dizziness or fainting, no limb weakness, no difficulty walking  Psychiatric: anxiety, but-- no personality change,-- no sleep disturbances-- and-- no emotional problems  Endocrine: muscle weakness-- and-- feelings of weakness, but-- no proptosis,-- no deepening of the voice-- and-- no hot flashes  Hematologic/Lymphatic: a tendency for easy bruising-- and-- Vascular purpura forearms, but-- no swollen glands,-- no tendency for easy bleeding-- and-- no swollen glands in the neck  ROS Reviewed:    ROS reviewed  Active Problems   1  Abdominal pain (789 00) (R10 9)   2  Anemia (285 9) (D64 9)   3  Cellulitis of hand without finger or thumb, right (682 4) (L03 113)   4  Cellulitis of head or scalp (682 8) (L03 811)   5  Chemotherapy-induced nausea (787 02,E933 1) (R11 0,T45  1X5A)   6   Chronic kidney disease, unspecified CKD stage (585 9) (N18 9)   7  Chronic lymphocytic leukemia (204 10) (C91 10)   8  Chronic maxillary sinusitis (473 0) (J32 0)   9  Coronary artery disease (414 00) (I25 10)   10  Dyslipidemia (272 4) (E78 5)   11  Dysphagia (787 20) (R13 10)   12  Esophagitis, reflux (530 11) (K21 0)   13  Heart disease (429 9) (I51 9)   14  Hemolytic anemia (283 9) (D58 9)   15  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,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)   16  History of hemolytic anemia (V12 3) (Z86 2)   17  Hyperlipidemia (272 4) (E78 5)   18  Hypokalemia (276 8) (E87 6)   19  HZV (herpes zoster virus) post 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) (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 29)   20  Joint pain (719 40) (M25 50)   21  Leukopenia (288 50) (D72 819)   22  Lower extremity edema (782 3) (R60 0)   23  Muscle Cramps (729 82)   24  Neutropenia (288 00) (D70 9)   25  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) (R76 12)   26  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,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,T38 0X5A)   27  Thrombocytopenia (287 5) (D69 6)   28  Ulcer of esophagus without bleeding (530 20) (K22 10)                          Reviewed       Past Medical History   1  History of Acute myocardial infarction (410 90) (I21 9)   2  History of Coronary Artery Disease (V12 59)   3  History of Dyslipidemia (272 4) (E78 5)   4  History of acute bronchitis (V12 69) (Z87 09)   5  History of bronchitis (V12 69) (Z87 09)   6  History of hemolytic anemia (V12 3) (Z86 2)   7  History of hypertension (V12 59) (Z86 79)   8  History of neutropenia (V12 3) (Z86 2)   9  History of thrombocytopenia (V12 3) (Z86 2)                          Reviewed       Surgical History   1  History of Cardiac Cath Lesion 1, 1st Adjunct Treat Device: Stent   2  History of Foot Surgery   3  History of Hand Surgery   4  History of Knee Surgery  Surgical History Reviewed: The surgical history was reviewed and updated today  Family History   Mother    1  Family history of Chronic Leukemia (V16 6)   2  Denied: Family history of Colon cancer   3   Denied: Family history of colon cancer   4  Denied: Family history of Crohn's disease   5  Denied: Family history of liver disease   6  Family history of Polyps Of The Sigmoid Colon  Father    7  Denied: Family history of Colon cancer   8  Denied: Family history of colon cancer   9  Denied: Family history of Crohn's disease   10  Denied: Family history of liver disease  Family History Reviewed: The family history was reviewed and updated today  Social History    · Being A Social Drinker   · Current every day smoker (305 1) (F17 200)  Social History Reviewed: The social history was reviewed and updated today  Current Meds    1  Aspirin 81 MG Oral Tablet Delayed Release; Therapy: 11Sep2017 to Recorded   2  BD Insulin Syringe Ultrafine 31G X 5/16 1 ML Miscellaneous; use four daily; Therapy: 18HTQ7764 to [phone]); Last Rx:26Oct2017 Ordered   3  Benzonatate 200 MG Oral Capsule; TAKE 1 CAPSULE 3 TIMES DAILY AS NEEDED; Therapy: 39KOU4610 to (77 873 135)  Requested for: 26Vsu4046; Last     Rx:07Sep2017 Ordered   4  Carafate 1 GM/10ML Oral Suspension; TAKE 10 ML 4 TIMES DAILY; Therapy: 21HKQ3403 to (Emma Snyder)  Requested for: 78WOH4656; Last     Rx:05Oct2017 Ordered   5  Citalopram Hydrobromide 40 MG Oral Tablet; Therapy: 11Sep2017 to Recorded   6  Clotrimazole 10 MG Mouth/Throat Constantine; ALLOW 1 CONSTANTINE TO SLOWLY DISSOLVE IN     MOUTH  4 TIMES A DAY; Therapy: 78FMV1267 to (Evaluate:14May2017)  Requested for: 97FGJ0302; Last     Rx:15Mar2017 Ordered   7  Doxycycline Hyclate 100 MG Oral Tablet; TAKE 1 TABLET DAILY AS DIRECTED; Therapy: 09BWJ2674 to (Evaluate:17Nov2017)  Requested for: 45RHE5149; Last     Rx:20Fqa3838 Ordered   8  Doxycycline Hyclate 100 MG Oral Tablet; TAKE 1 TABLET EVERY 12 HOURS DAILY; Therapy: 48KWW1148 to (Evaluate:81Num3702)  Requested for: 49Qbg7934; Last     Rx:70Qxv2222 Ordered   9   Fenofibrate 50 MG Oral Capsule; TAKE 1 CAPSULE DAILY WITH A MEAL; Therapy: (Recorded:05Apr2017) to Recorded   10  Folic Acid 1 MG Oral Tablet; Therapy: (Recorded:07Mar2014) to Recorded   11  Gabapentin 100 MG Oral Capsule; TAKE 1 CAPSULE 3 TIMES DAILY; Therapy: 16EJH1668 to (Saranya Mccord)  Requested for: 35Mpi2585; Last      Rx:19Svl2251 Ordered   12  Gazyva 1000 MG/40ML Intravenous Solution; Therapy: 92Wrh8738 to Recorded   13  GNP Potassium Gluconate 595 (99 K) MG Oral Tablet; Take 1 tablet daily Recorded   14  Imbruvica 140 MG Oral Capsule; TAKE 2 CAPSULE Daily; Therapy: 37Ent8189 to (Evaluate:08Jan2018)  Requested for: 55UEZ4773; Last      Rx:10Oct2017 Ordered   15  Keflex 500 MG Oral Capsule; Therapy: (Recorded:18Oct2017) to Recorded   16  Ondansetron 8 MG Oral Tablet Disintegrating; 1 tab PO Q 8 hr PRN nausea; Therapy: 03MFM4156 to (Last Rx:15Mar2017)  Requested for: 25SFM1517 Ordered   17  OxyCODONE HCl ER 10 MG Oral Tablet ER 12 Hour Abuse-Deterrent; TAKE 1 TABLET      EVERY 12 HOURS DAILY; Therapy: 43IHW4286 to (Evaluate:06Jan2018); Last Rx:71Qel4697 Ordered   18  OxyCONTIN 15 MG Oral Tablet ER 12 Hour Abuse-Deterrent; TAKE 1 TABLET EVERY 12      HOURS DAILY; Therapy: 61MIA5388 to (Evaluate:05Jan2018); Last Rx:16Btz7493 Ordered   19  Pantoprazole Sodium 40 MG Oral Tablet Delayed Release; TAKE 1 TABLET DAILY IN AM      PRIOR TO BREAKFAST; Therapy: 48MZT5275 to (Last Rx:88Zrn6003)  Requested for: 46HCL7372 Ordered   20  Percocet 5-325 MG Oral Tablet; Therapy: (Recorded:18Oct2017) to Recorded   21  Potassium Chloride ER 10 MEQ Oral Tablet Extended Release; one daily  or as advised; Therapy: 96KKP1268 to (77 096 135)  Requested for: 43HHP6590; Last      Rx:61Pmx9745 Ordered   22  PredniSONE 20 MG Oral Tablet; TAKE 1 TABLET DAILY WITH FOOD AS DIRECTED; Therapy: 12DOT5502 to (Last Rx:57Zrt2311)  Requested for: 75LCR8958 Ordered   23   [facility] [place]outhwash 1:1:1 maalox/diphenhydramine/lidocaine; 15 ml PO q6 prn; Therapy: 31XWT9536 to (Evaluate:14Mtf1609); Last Rx:05Oct2017 Ordered   24  TraMADol HCl - 50 MG Oral Tablet; TAKE 1 TABLET EVERY 6 HOURS AS NEEDED FOR      ONGOING PAIN;      Therapy: 75GRP4589 to (Last Rx:27Qmv4891) Ordered    Allergies   1  Heparin                          Reviewed  History of HIT       Vitals   Vital Signs    Recorded: 06WEH2526 08:32AM   Temperature 97 2 F   Heart Rate 74   Respiration 16   Systolic 977   Diastolic 72   Height 6 ft    Weight 220 lb 4 oz   BMI Calculated 29 87   BSA Calculated 2 22   O2 Saturation 99   Pain Scale 0       Reviewed       Physical Exam                             Patient is alert and oriented and not in  distress  Stable vital signs   Jerone Yan No icterus  No oral thrush  No palpable neck mass  Lung fields are clear to percussion and auscultation  Heart rate is regular  Abdomen soft and nontender  No palpable abdominal mass  No ascites  No edema of ankles  No calf tenderness  No focal neurological deficit  No skin rash  Vascular purpura forearms  Decreased muscle mass  No palpable lymphadenopathy  Good arterial pulses  No clubbing  Anxious  Performance status one  ECOG 1       Results/Data   (1) COMPREHENSIVE METABOLIC PANEL [id number] 19:12FK EPIC, Provider   Test ordered by: Herminia Alva      Test Name Result Flag Reference   GLUCOSE,RANDM 121 mg/dL     If the patient is fasting, the ADA then defines impaired fasting glucose as > 100 mg/dL and diabetes as > or equal to 123 mg/dL  Specimen collection should occur prior to Sulfasalazine administration due to the potential for falsely depressed results  Specimen collection should occur prior to Sulfapyridine administration due to the potential for falsely elevated results     SODIUM 140 mmol/L  136-145   POTASSIUM 3 9 mmol/L  3 5-5 3   CHLORIDE 103 mmol/L     CARBON DIOXIDE 29 mmol/L  21-32   ANION GAP (CALC) 8 mmol/L  4-13   BLOOD UREA NITROGEN 22 mg/dL  5-25   CREATININE 1 00 mg/dL  0 60-1 30   Standardized to [facility] reference method   CALCIUM 9 8 mg/dL  8 3-10 1   BILI, TOTAL 0 90 mg/dL  0 20-1 00   ALK PHOSPHATAS 47 U/L     ALT (SGPT) 20 U/L  12-78   Specimen collection should occur prior to Sulfasalazine administration due to the potential for falsely depressed results  AST(SGOT) 26 U/L  5-45   Specimen collection should occur prior to Sulfasalazine administration due to the potential for falsely depressed results  ALBUMIN 4 0 g/dL  3 2-5 0   TOTAL PROTEIN 5 8 g/dL L 6 4-8 2   eGFR 80 ml/min/1 73sq m     National Kidney Disease Education Program recommendations are as follows:     GFR calculation is accurate only with a steady state creatinine     Chronic Kidney disease less than 60 ml/min/1 73 sq  meters     Kidney failure less than 15 ml/min/1 73 sq  meters  (1) CBC/PLT/DIFF 52GMF2071 08:17AM [facility], Provider   Test ordered by: Mary Beth Old      Test Name Result Flag Reference   WBC COUNT 3 10 Thousand/uL L 4 31-10 16   WBC ADJUSTED 3 10 Thousand/uL L 4 31-10 16   RBC COUNT 4 97 Million/uL  3 88-5 62   HEMOGLOBIN 14 2 g/dL  12 0-17 0   HEMATOCRIT 42 6 %  36 5-49 3   MCV 86 fL  82-98   MCH 28 5 pg  26 8-34 3   MCHC 33 3 g/dL  31 4-37 4   RDW 15 3 % H 11 6-15 1   PLATELET COUNT 49 Thousands/uL -390      (1) RETICULOCYTE COUNT [id number] 08:17AM [facility], Provider   Test ordered by: Mary Beth Old      Test Name Result Flag Reference   RETIC ABS # [id number]   RETIC % 2 92 % H 0 37-1 87      (1) BILIRUBIN, DIRECT [id number] 08:17AM [facility], Provider   Test ordered by: Mary Beth Old      Test Name Result Flag Reference   BILI, DIRECT 0 20 mg/dL  0 00-0 20      (1) URIC ACID [id number] 08:32AM Proothi, Gideon      Test Name Result Flag Reference   URIC ACID 4 7 mg/dL  4 2-8 0   Specimen collection should occur prior to Metamizole administration due to the potential for falsely depressed results        (1) LD (LDH) 76HVD9759 08:32AM Proothi, Eros Nash      Test Name Result Flag Reference   LD (LDH) 261 U/L H       (1) IGG [id number] 08:32AM Proothi, Gideon      Test Name Result Flag Reference   GAMMAGLOBULIN;  0 mg/dL L 700 0-1600 0      (1) B-2 MICROGLOBULIN [id number] 08:32AM Proothi, Gideon      Test Name Result Flag Reference   B-2 MICROGLOBUL 2 6 mg/L H 0 6 - 2 4   S[employer]ens Immulite 2000 Immunochemiluminometric assay [facility])     Performed at:  [facility] [address]  [id number]     : Chidi Saucedo MD, Phone:  [phone]      Health Management   Esophagitis, reflux   EGD; every 3 months; Last 34ZMN1006; Next Due: 41JCL8869; Near Due    Future Appointments      Date/Time Provider Specialty Site   01/24/2018 01:20 PM Analia Hansen DO Gastroenterology Adult [facility]   01/18/2018 01:00 PM TYSHAWN Zheng   Endocrinology [facility] ENDOCRINOLOGY   01/24/2018 08:30 AM Giovani Cancer, [facility] Hematology Oncology [facility]     Signatures    Electronically signed by : Michel Hurtado MD; Dec 27 2017  9:38PM EST                       (Author)

## 2017-12-29 ENCOUNTER — HOSPITAL ENCOUNTER (OUTPATIENT)
Dept: INFUSION CENTER | Facility: CLINIC | Age: 62
Discharge: HOME | End: 2017-12-31
Payer: MEDICARE

## 2017-12-29 VITALS
TEMPERATURE: 97.5 F | HEART RATE: 83 BPM | RESPIRATION RATE: 16 BRPM | SYSTOLIC BLOOD PRESSURE: 145 MMHG | DIASTOLIC BLOOD PRESSURE: 76 MMHG

## 2017-12-29 LAB
ALBUMIN SERPL BCP-MCNC: 4 G/DL (ref 3.2–5)
ALP SERPL-CCNC: 49 U/L (ref 46–116)
ALT SERPL W P-5'-P-CCNC: 21 U/L (ref 12–78)
ANION GAP SERPL CALCULATED.3IONS-SCNC: 6 MMOL/L (ref 4–13)
AST SERPL W P-5'-P-CCNC: 27 U/L (ref 5–45)
BASOPHILS # BLD AUTO: 0 THOUSAND/UL (ref 0–0.1)
BASOPHILS NFR MAR MANUAL: 0 % (ref 0–1)
BILIRUB DIRECT SERPL-MCNC: 0.2 MG/DL (ref 0–0.2)
BILIRUB SERPL-MCNC: 0.9 MG/DL (ref 0.2–1)
BUN SERPL-MCNC: 22 MG/DL (ref 5–25)
CALCIUM SERPL-MCNC: 9.1 MG/DL (ref 8.3–10.1)
CHLORIDE SERPL-SCNC: 107 MMOL/L (ref 98–108)
CO2 SERPL-SCNC: 29 MMOL/L (ref 21–32)
CREAT SERPL-MCNC: 1.2 MG/DL (ref 0.6–1.3)
EOSINOPHIL # BLD AUTO: 0.04 THOUSAND/UL (ref 0–0.61)
EOSINOPHIL NFR BLD MANUAL: 1 % (ref 0–6)
ERYTHROCYTE [DISTWIDTH] IN BLOOD BY AUTOMATED COUNT: 14.6 % (ref 11.6–15.1)
GFR SERPL CREATININE-BSD FRML MDRD: 64 ML/MIN/1.73SQ M
GLUCOSE SERPL-MCNC: 115 MG/DL (ref 65–140)
HCT VFR BLD AUTO: 42.6 % (ref 36.5–49.3)
HGB BLD-MCNC: 13.8 G/DL (ref 12–17)
LYMPHOCYTES # BLD AUTO: 0.55 THOUSAND/UL (ref 0.6–4.47)
LYMPHOCYTES # BLD AUTO: 14 % (ref 14–44)
MCH RBC QN AUTO: 27.7 PG (ref 26.8–34.3)
MCHC RBC AUTO-ENTMCNC: 32.4 G/DL (ref 31.4–37.4)
MCV RBC AUTO: 85 FL (ref 82–98)
MONOCYTES # BLD AUTO: 0.55 THOUSAND/UL (ref 0–1.22)
MONOCYTES NFR BLD AUTO: 14 % (ref 4–12)
NEUTS BAND NFR BLD MANUAL: 3 % (ref 0–8)
NEUTS SEG # BLD: 2.77 THOUSAND/UL (ref 1.81–6.82)
NEUTS SEG NFR BLD AUTO: 68 % (ref 43–75)
OVALOCYTES BLD QL SMEAR: PRESENT
PLATELET # BLD AUTO: 39 THOUSANDS/UL (ref 149–390)
PLATELET BLD QL SMEAR: ABNORMAL
PMV BLD AUTO: 8.2 FL (ref 8.9–12.7)
POTASSIUM SERPL-SCNC: 3.9 MMOL/L (ref 3.5–5.3)
PROT SERPL-MCNC: 5.8 G/DL (ref 6.4–8.2)
RBC # BLD AUTO: 4.99 MILLION/UL (ref 3.88–5.62)
SODIUM SERPL-SCNC: 142 MMOL/L (ref 136–145)
TOTAL CELLS COUNTED SPEC: 100
WBC # BLD AUTO: 3.9 THOUSAND/UL (ref 4.31–10.16)
WBC NRBC COR # BLD: 3.9 THOUSAND/UL (ref 4.31–10.16)

## 2017-12-29 PROCEDURE — 85007 BL SMEAR W/DIFF WBC COUNT: CPT | Performed by: INTERNAL MEDICINE

## 2017-12-29 PROCEDURE — 82248 BILIRUBIN DIRECT: CPT | Performed by: INTERNAL MEDICINE

## 2017-12-29 PROCEDURE — 85027 COMPLETE CBC AUTOMATED: CPT | Performed by: INTERNAL MEDICINE

## 2017-12-29 PROCEDURE — 80053 COMPREHEN METABOLIC PANEL: CPT | Performed by: INTERNAL MEDICINE

## 2017-12-29 NOTE — PLAN OF CARE
Problem: PAIN - ADULT  Goal: Verbalizes/displays adequate comfort level or baseline comfort level  Interventions:  - Encourage patient to monitor pain and request assistance  - Assess pain using appropriate pain scale  - Administer analgesics based on type and severity of pain and evaluate response  - Implement non-pharmacological measures as appropriate and evaluate response  - Consider cultural and social influences on pain and pain management  - Notify physician/advanced practitioner if interventions unsuccessful or patient reports new pain  Outcome: Progressing      Problem: INFECTION - ADULT  Goal: Absence or prevention of progression during hospitalization  INTERVENTIONS:  - Assess and monitor for signs and symptoms of infection  - Monitor lab/diagnostic results  - Monitor all insertion sites, i e  indwelling lines, tubes, and drains  - Monitor endotracheal (as able) and nasal secretions for changes in amount and color  - [place] appropriate cooling/warming therapies per order  - Administer medications as ordered  - Instruct and encourage patient and family to use good hand hygiene technique  - Identify and instruct in appropriate isolation precautions for identified infection/condition  Outcome: Progressing    Goal: Absence of fever/infection during neutropenic period  INTERVENTIONS:  - Monitor WBC  - Implement neutropenic guidelines  Outcome: Progressing      Problem: Knowledge Deficit  Goal: Patient/family/caregiver demonstrates understanding of disease process, treatment plan, medications, and discharge instructions  Complete learning assessment and assess knowledge base    Interventions:  - Provide teaching at level of understanding  - Provide teaching via preferred learning methods  Outcome: Progressing

## 2017-12-29 NOTE — PROGRESS NOTES
Call received from lab with critical Platelet result  Platelets 39   TC to office of Dr Jacinda Lee; spoke with Asaf Buenrostro RN  No new orders at this time  Denies bleeding at this time  Pt  Discharged to home  Declined AVS   Copy of labs given

## 2017-12-29 NOTE — PROGRESS NOTES
Pt  Denies new symptoms or concerns at this time  CBC, CMP and Bilirubin, Direct obtained as ordered  Pt  Verbalized he will stay at infusion center awaiting lab results

## 2018-01-02 ENCOUNTER — HOSPITAL ENCOUNTER (OUTPATIENT)
Dept: INFUSION CENTER | Facility: CLINIC | Age: 63
Discharge: HOME | End: 2018-01-04
Payer: MEDICARE

## 2018-01-02 LAB
ALBUMIN SERPL BCP-MCNC: 4 G/DL (ref 3.2–5)
ALP SERPL-CCNC: 52 U/L (ref 46–116)
ALT SERPL W P-5'-P-CCNC: 22 U/L (ref 12–78)
ANION GAP SERPL CALCULATED.3IONS-SCNC: 11 MMOL/L (ref 4–13)
ANISOCYTOSIS BLD QL SMEAR: PRESENT
AST SERPL W P-5'-P-CCNC: 27 U/L (ref 5–45)
BASOPHILS # BLD AUTO: 0.04 THOUSAND/UL (ref 0–0.1)
BASOPHILS NFR MAR MANUAL: 1 % (ref 0–1)
BILIRUB DIRECT SERPL-MCNC: 0.15 MG/DL (ref 0–0.2)
BILIRUB SERPL-MCNC: 0.7 MG/DL (ref 0.2–1)
BUN SERPL-MCNC: 17 MG/DL (ref 5–25)
CALCIUM SERPL-MCNC: 9.7 MG/DL (ref 8.3–10.1)
CHLORIDE SERPL-SCNC: 107 MMOL/L (ref 98–108)
CO2 SERPL-SCNC: 28 MMOL/L (ref 21–32)
CREAT SERPL-MCNC: 1 MG/DL (ref 0.6–1.3)
EOSINOPHIL # BLD AUTO: 0.14 THOUSAND/UL (ref 0–0.61)
EOSINOPHIL NFR BLD MANUAL: 4 % (ref 0–6)
ERYTHROCYTE [DISTWIDTH] IN BLOOD BY AUTOMATED COUNT: 14.6 % (ref 11.6–15.1)
GFR SERPL CREATININE-BSD FRML MDRD: 80 ML/MIN/1.73SQ M
GLUCOSE SERPL-MCNC: 134 MG/DL (ref 65–140)
HCT VFR BLD AUTO: 41.6 % (ref 36.5–49.3)
HGB BLD-MCNC: 13.8 G/DL (ref 12–17)
LG PLATELETS BLD QL SMEAR: PRESENT
LYMPHOCYTES # BLD AUTO: 0.74 THOUSAND/UL (ref 0.6–4.47)
LYMPHOCYTES # BLD AUTO: 21 % (ref 14–44)
MCH RBC QN AUTO: 28.1 PG (ref 26.8–34.3)
MCHC RBC AUTO-ENTMCNC: 33.1 G/DL (ref 31.4–37.4)
MCV RBC AUTO: 85 FL (ref 82–98)
MONOCYTES # BLD AUTO: 0.42 THOUSAND/UL (ref 0–1.22)
MONOCYTES NFR BLD AUTO: 12 % (ref 4–12)
NEUTS BAND NFR BLD MANUAL: 2 % (ref 0–8)
NEUTS SEG # BLD: 2.17 THOUSAND/UL (ref 1.81–6.82)
NEUTS SEG NFR BLD AUTO: 60 % (ref 43–75)
PLATELET # BLD AUTO: 62 THOUSANDS/UL (ref 149–390)
PLATELET BLD QL SMEAR: ABNORMAL
PMV BLD AUTO: 8.3 FL (ref 8.9–12.7)
POTASSIUM SERPL-SCNC: 4 MMOL/L (ref 3.5–5.3)
PROT SERPL-MCNC: 6 G/DL (ref 6.4–8.2)
RBC # BLD AUTO: 4.91 MILLION/UL (ref 3.88–5.62)
RETICS # AUTO: NORMAL 10*3/UL (ref 14356–105094)
RETICS # CALC: 1.63 % (ref 0.37–1.87)
SODIUM SERPL-SCNC: 146 MMOL/L (ref 136–145)
TOTAL CELLS COUNTED SPEC: 100
WBC # BLD AUTO: 3.5 THOUSAND/UL (ref 4.31–10.16)
WBC NRBC COR # BLD: 3.5 THOUSAND/UL (ref 4.31–10.16)

## 2018-01-02 PROCEDURE — 82248 BILIRUBIN DIRECT: CPT | Performed by: INTERNAL MEDICINE

## 2018-01-02 PROCEDURE — 85045 AUTOMATED RETICULOCYTE COUNT: CPT | Performed by: INTERNAL MEDICINE

## 2018-01-02 PROCEDURE — 85007 BL SMEAR W/DIFF WBC COUNT: CPT | Performed by: INTERNAL MEDICINE

## 2018-01-02 PROCEDURE — 85027 COMPLETE CBC AUTOMATED: CPT | Performed by: INTERNAL MEDICINE

## 2018-01-02 PROCEDURE — 80053 COMPREHEN METABOLIC PANEL: CPT | Performed by: INTERNAL MEDICINE

## 2018-01-02 NOTE — PLAN OF CARE
Problem: Potential for Falls  Goal: Patient will remain free of falls  INTERVENTIONS:  - Assess patient frequently for physical needs  -  Identify cognitive and physical deficits and behaviors that affect risk of falls    -  [place] fall precautions as indicated by assessment   - Educate patient/family on patient safety including physical limitations  - Instruct patient to call for assistance with activity based on assessment  - Modify environment to reduce risk of injury  - Consider OT/PT consult to assist with strengthening/mobility   Outcome: Progressing

## 2018-01-08 ENCOUNTER — HOSPITAL ENCOUNTER (OUTPATIENT)
Dept: INFUSION CENTER | Facility: CLINIC | Age: 63
Discharge: HOME | End: 2018-01-10
Payer: MEDICARE

## 2018-01-08 LAB
ALBUMIN SERPL BCP-MCNC: 4 G/DL (ref 3.2–5)
ALP SERPL-CCNC: 43 U/L (ref 46–116)
ALT SERPL W P-5'-P-CCNC: 23 U/L (ref 12–78)
ANION GAP SERPL CALCULATED.3IONS-SCNC: 7 MMOL/L (ref 4–13)
AST SERPL W P-5'-P-CCNC: 29 U/L (ref 5–45)
BASOPHILS # BLD AUTO: 0 THOUSAND/UL (ref 0–0.1)
BASOPHILS NFR MAR MANUAL: 0 % (ref 0–1)
BILIRUB DIRECT SERPL-MCNC: 0.17 MG/DL (ref 0–0.2)
BILIRUB SERPL-MCNC: 0.7 MG/DL (ref 0.2–1)
BUN SERPL-MCNC: 23 MG/DL (ref 5–25)
CALCIUM SERPL-MCNC: 9.2 MG/DL (ref 8.3–10.1)
CHLORIDE SERPL-SCNC: 108 MMOL/L (ref 98–108)
CO2 SERPL-SCNC: 29 MMOL/L (ref 21–32)
CREAT SERPL-MCNC: 1.2 MG/DL (ref 0.6–1.3)
EOSINOPHIL # BLD AUTO: 0.1 THOUSAND/UL (ref 0–0.61)
EOSINOPHIL NFR BLD MANUAL: 3 % (ref 0–6)
ERYTHROCYTE [DISTWIDTH] IN BLOOD BY AUTOMATED COUNT: 14.4 % (ref 11.6–15.1)
GFR SERPL CREATININE-BSD FRML MDRD: 64 ML/MIN/1.73SQ M
GLUCOSE SERPL-MCNC: 134 MG/DL (ref 65–140)
HCT VFR BLD AUTO: 42 % (ref 36.5–49.3)
HGB BLD-MCNC: 13.9 G/DL (ref 12–17)
LYMPHOCYTES # BLD AUTO: 0.64 THOUSAND/UL (ref 0.6–4.47)
LYMPHOCYTES # BLD AUTO: 20 % (ref 14–44)
MCH RBC QN AUTO: 27.7 PG (ref 26.8–34.3)
MCHC RBC AUTO-ENTMCNC: 33.1 G/DL (ref 31.4–37.4)
MCV RBC AUTO: 84 FL (ref 82–98)
MONOCYTES # BLD AUTO: 0.06 THOUSAND/UL (ref 0–1.22)
MONOCYTES NFR BLD AUTO: 2 % (ref 4–12)
NEUTS BAND NFR BLD MANUAL: 0 % (ref 0–8)
NEUTS SEG # BLD: 2.4 THOUSAND/UL (ref 1.81–6.82)
NEUTS SEG NFR BLD AUTO: 75 % (ref 43–75)
OVALOCYTES BLD QL SMEAR: PRESENT
PLATELET # BLD AUTO: 52 THOUSANDS/UL (ref 149–390)
PLATELET BLD QL SMEAR: ABNORMAL
PMV BLD AUTO: 9.2 FL (ref 8.9–12.7)
POTASSIUM SERPL-SCNC: 4.2 MMOL/L (ref 3.5–5.3)
PROT SERPL-MCNC: 5.8 G/DL (ref 6.4–8.2)
RBC # BLD AUTO: 5.01 MILLION/UL (ref 3.88–5.62)
RETICS # AUTO: NORMAL 10*3/UL (ref 14356–105094)
RETICS # CALC: 1.75 % (ref 0.37–1.87)
SODIUM SERPL-SCNC: 144 MMOL/L (ref 136–145)
TOTAL CELLS COUNTED SPEC: 100
WBC # BLD AUTO: 3.2 THOUSAND/UL (ref 4.31–10.16)
WBC NRBC COR # BLD: 3.2 THOUSAND/UL (ref 4.31–10.16)

## 2018-01-08 PROCEDURE — 85007 BL SMEAR W/DIFF WBC COUNT: CPT | Performed by: INTERNAL MEDICINE

## 2018-01-08 PROCEDURE — 85027 COMPLETE CBC AUTOMATED: CPT | Performed by: INTERNAL MEDICINE

## 2018-01-08 PROCEDURE — 82248 BILIRUBIN DIRECT: CPT | Performed by: INTERNAL MEDICINE

## 2018-01-08 PROCEDURE — 85045 AUTOMATED RETICULOCYTE COUNT: CPT | Performed by: INTERNAL MEDICINE

## 2018-01-08 PROCEDURE — 80053 COMPREHEN METABOLIC PANEL: CPT | Performed by: INTERNAL MEDICINE

## 2018-01-08 NOTE — PLAN OF CARE
Problem: Potential for Falls  Goal: Patient will remain free of falls  INTERVENTIONS:  - Assess patient frequently for physical needs  -  Identify cognitive and physical deficits and behaviors that affect risk of falls    -  [place] fall precautions as indicated by assessment   - Educate patient/family on patient safety including physical limitations  - Instruct patient to call for assistance with activity based on assessment  - Modify environment to reduce risk of injury  - Consider OT/PT consult to assist with strengthening/mobility   Outcome: Progressing

## 2018-01-08 NOTE — PROGRESS NOTES
Pt without complaint, central labs drawn and port flushed per protocol,pt tolerated well, left unit in stable condition, declines AVS today

## 2018-01-09 NOTE — MISCELLANEOUS
Message   Recorded as Task   Date: 06/12/2017 12:50 PM, Created By: Ector Leroy   Task Name: Call Back   Assigned To: Dia Claudio   Regarding Patient: Marcelo Rao, Status: Active   Comment:    [place]rueger[place]ay - 12 Jun 2017 12:50 PM     TASK CREATED  Caller: Self; Results Inquiry; [phone] (Home); [phone] x,,,,, (Work)  Pt is calling for blood test results from this morning and instruction  Please call back  Results d/w Dr Miri Harris  Spoke with pt and updated on results and to resume Venclexta at 100 mg daily  Active Problems    1  Abdominal pain (789 00) (R10 9)   2  Bronchitis (490) (J40)   3  Chemotherapy-induced nausea (787 02,E933 1) (R11 0,T45  1X5A)   4  Chronic lymphocytic leukemia (204 10) (C91 10)   5  Chronic maxillary sinusitis (473 0) (J32 0)   6  Coronary artery disease (414 00) (I25 10)   7  Dyslipidemia (272 4) (E78 5)   8  Dysphagia (787 20) (R13 10)   9  Esophagitis, reflux (530 11) (K21 0)   10  Heart disease (429 9) (I51 9)   11  Hemolytic anemia (283 9) (D58 9)   12  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,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)   13  Leukopenia (288 50) (D72 819)   14  Lower extremity edema (782 3) (R60 0)   15  Muscle Cramps (729 82)   16  Neutropenia (288 00) (D70 9)   17  Thrombocytopenia (287 5) (D69 6)    Current Meds   1  Acyclovir 400 MG Oral Tablet; 1 tab po bid; Therapy: 30IDP3278 to (Last Rx:15Mar2017)  Requested for: 25UDD2768 Ordered   2  Acyclovir TABS; 600mg twice a day; Therapy: (Recorded:12Chi9749) to Recorded   3  Allopurinol 100 MG Oral Tablet; 2 daily  Stop if rash; Therapy: 25ARQ1698 to (Last Rx:56Hfd2837)  Requested for: 05Ulg4932 Ordered   4  Clopidogrel Bisulfate 75 MG Oral Tablet; Take 1 tablet daily Recorded   5  Clotrimazole 10 MG Mouth/Throat Cindy; ALLOW 1 CINDY TO SLOWLY DISSOLVE   IN MOUTH  4 TIMES A DAY; Therapy: 67RCR6697 to (Evaluate:87Dxc7045)  Requested for: 51CNO1544; Last   Rx:15Mar2017 Ordered   6   Fenofibrate 50 MG Oral Capsule; TAKE 1 CAPSULE DAILY WITH A MEAL; Therapy: (Recorded:05Apr2017) to Recorded   7  Folic Acid 1 MG Oral Tablet; Therapy: (Recorded:07Mar2014) to Recorded   8  Gabapentin 100 MG Oral Capsule; TAKE 1 CAPSULE 3 TIMES DAILY; Therapy: 36LGE4493 to (Cyndi Gore)  Requested for: 53Zmo2942; Last   Rx:51Qrc7271 Ordered   9  GNP Potassium Gluconate 595 (99 K) MG Oral Tablet; Take 1 tablet daily Recorded   10  LevoFLOXacin 750 MG Oral Tablet; TAKE AS DIRECTED; Therapy: (Recorded:05Apr2017) to Recorded   11  Lexapro 10 MG Oral Tablet (Escitalopram Oxalate); TAKE 1 TABLET DAILY; Therapy: (Recorded:05Apr2017) to Recorded   12  Lisinopril 20 MG Oral Tablet; Take 1 tablet daily Recorded   13  Metoprolol Tartrate 50 MG Oral Tablet; Therapy: 91UKK5164 to (Evaluate:06Jul2014) Recorded   14  Ondansetron 8 MG Oral Tablet Disintegrating; 1 tab PO Q 8 hr PRN nausea; Therapy: 60CAV2563 to (Last Rx:15Mar2017)  Requested for: 10VHV3348 Ordered   15  Pantoprazole Sodium 40 MG Oral Tablet Delayed Release (Protonix); TAKE 1 TABLET    TWICE A DAY; Therapy: 32PJT2776 to (Last Rx:12Aug2015)  Requested for: 06Ucf7013 Ordered   16  Plavix 75 MG Oral Tablet (Clopidogrel Bisulfate); Therapy: (Recorded:18May2015) to Recorded   17  PredniSONE 10 MG Oral Tablet; one daily with food; Therapy: 15IYJ0114 to (Linn Herrera)  Requested for: 77EIB1063; Last    Rx:30Nov2016 Ordered   18  PredniSONE 5 MG Oral Tablet; 1 by mouth daily or as directed  Take with food     Requested for: 23VDN0312; Last Rx:04Ijw6422 Ordered   19  Ranexa 500 MG Oral Tablet Extended Release 12 Hour; Therapy: (Recorded:02Jan2013) to Recorded   20  ValACYclovir HCl - 1 GM Oral Tablet; Therapy: (Recorded:30Nov2016) to Recorded   21  Venclexta 10 MG Oral Tablet; 12 tabs po daily; Therapy: 14Apr2017 to (Last Rx:25May2017)  Requested for: 12GDU5210 Ordered   22   Ventolin  (90 Base) MCG/ACT Inhalation Aerosol Solution; INHALE 1 TO 2    PUFFS EVERY 4 TO 6 HOURS AS NEEDED; Therapy: 97EXM7617 to (Yara Stagemayank)  Requested for: 66EWZ5471; Last    Rx:48Pnt9172 Ordered    Allergies    1  Heparin   2   Macrolides    Signatures   Electronically signed by : Heath Castro RN; Jun 12 2017  1:28PM EST                       (Author)

## 2018-01-09 NOTE — MISCELLANEOUS
Message   Recorded as Task   Date: 04/26/2017 10:17 AM, Created By: Katarzyna Grady   Task Name: Call Back   Assigned To: Warren Joseph   Regarding Patient: Winston Rendon, Status: Active   CommentNorehsan Hastings - 26 Apr 2017 10:17 AM     TASK CREATED  Caller: Self; Other; [phone] (Home)  RECEIVED NEW MEDICATION AND IS UNCLEAR ON THE INSTRUCTIONS  WOULD LIKE A BETTER EXPLANATION PRIOR TO TAKING IT  Called pt  Reached VM  Left message with directions for Venclexta  Call back with questions  Active Problems    1  Abdominal pain (789 00) (R10 9)   2  Bronchitis (490) (J40)   3  Chemotherapy-induced nausea (787 02,E933 1) (R11 0,T45  1X5A)   4  Chronic lymphocytic leukemia (204 10) (C91 10)   5  Chronic maxillary sinusitis (473 0) (J32 0)   6  Coronary artery disease (414 00) (I25 10)   7  Dyslipidemia (272 4) (E78 5)   8  Dysphagia (787 20) (R13 10)   9  Esophagitis, reflux (530 11) (K21 0)   10  Heart disease (429 9) (I51 9)   11  Hemolytic anemia (283 9) (D58 9)   12  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,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)   13  Leukopenia (288 50) (D72 819)   14  Lower extremity edema (782 3) (R60 0)   15  Muscle Cramps (729 82)   16  Neutropenia (288 00) (D70 9)   17  Thrombocytopenia (287 5) (D69 6)    Current Meds   1  Acyclovir 400 MG Oral Tablet; 1 tab po bid; Therapy: 28ZVO8772 to (Last Rx:15Mar2017)  Requested for: 98GRE6351 Ordered   2  Acyclovir TABS; 600mg twice a day; Therapy: (Recorded:75Jrg9961) to Recorded   3  Allopurinol 100 MG Oral Tablet; 2 daily  Stop if rash; Therapy: 79OBN8397 to (Last Rx:23Jsj8564)  Requested for: 29Sha8718 Ordered   4  Carafate 1 GM/10ML Oral Suspension; TAKE 10 ML 4 TIMES DAILY; Therapy: 48KKI4066 to (78 220 82 17)  Requested for: 21Zeg0935; Last   Rx:85Vds7071 Ordered   5  Clopidogrel Bisulfate 75 MG Oral Tablet; Take 1 tablet daily Recorded   6   Clotrimazole 10 MG Mouth/Throat Cindy; ALLOW 1 CINDY TO SLOWLY DISSOLVE   IN MOUTH  4 TIMES A DAY; Therapy: 29TFN9526 to (Evaluate:14May2017)  Requested for: 87UYD6495; Last   Rx:15Mar2017 Ordered   7  Fenofibrate 50 MG Oral Capsule; TAKE 1 CAPSULE DAILY WITH A MEAL; Therapy: (Recorded:05Apr2017) to Recorded   8  Folic Acid 1 MG Oral Tablet; Therapy: (Recorded:07Mar2014) to Recorded   9  Gabapentin 100 MG Oral Capsule; TAKE 1 CAPSULE 3 TIMES DAILY; Therapy: 48ABR8433 to (Jamesland Slim)  Requested for: 50Vvh9259; Last   Rx:04Aug2015 Ordered   10  GNP Potassium Gluconate 595 (99 K) MG Oral Tablet; Take 1 tablet daily Recorded   11  LevoFLOXacin 750 MG Oral Tablet; TAKE AS DIRECTED; Therapy: (Recorded:05Apr2017) to Recorded   12  Lexapro 10 MG Oral Tablet (Escitalopram Oxalate); TAKE 1 TABLET DAILY; Therapy: (Recorded:05Apr2017) to Recorded   13  Lisinopril 20 MG Oral Tablet; Take 1 tablet daily Recorded   14  Metoprolol Tartrate 50 MG Oral Tablet; Therapy: 25NIF2218 to (Evaluate:06Jul2014) Recorded   15  Ondansetron 8 MG Oral Tablet Dispersible; 1 tab PO Q 8 hr PRN nausea; Therapy: 26UJT5913 to (Last Rx:15Mar2017)  Requested for: 35OMP9770 Ordered   16  Pantoprazole Sodium 40 MG Oral Tablet Delayed Release (Protonix); TAKE 1 TABLET    TWICE A DAY; Therapy: 56JMB2010 to (Last Rx:97Qun6952)  Requested for: 49Lpr9403 Ordered   17  Plavix 75 MG Oral Tablet (Clopidogrel Bisulfate); Therapy: (Recorded:12Doy5474) to Recorded   18  PredniSONE 10 MG Oral Tablet; one daily with food; Therapy: 35UBW6641 to (Alden Alcantara)  Requested for: 88FKS7024; Last    Rx:30Nov2016 Ordered   19  PredniSONE 5 MG Oral Tablet; 1 by mouth daily or as directed  Take with food     Requested for: 04QCF2223; Last Rx:11Mzf7516 Ordered   20  Ranexa 500 MG Oral Tablet Extended Release 12 Hour; Therapy: (Recorded:02Jan2013) to Recorded   21  ValACYclovir HCl - 1 GM Oral Tablet; Therapy: (Recorded:30Nov2016) to Recorded   22   Venclexta 10 MG Oral Tablet; 2 TABS PO DAILY X 1 WEEK THEN INCREASE TO 4 TABS    PO DAILY; Therapy: 01Dad8413 to (Last Rx:96Izu8700)  Requested for: 47Knn7380 Ordered   23  Ventolin  (90 Base) MCG/ACT Inhalation Aerosol Solution; INHALE 1 TO 2    PUFFS EVERY 4 TO 6 HOURS AS NEEDED; Therapy: 48BVL6048 to (Chick Dill)  Requested for: 23IPL9186; Last    Rx:25Aew7587 Ordered    Allergies    1  Heparin   2   Macrolides    Signatures   Electronically signed by : Dana Rivas RN; Apr 26 2017 10:31AM EST                       (Author)

## 2018-01-10 NOTE — MISCELLANEOUS
Message   Recorded as Task   Date: 02/11/2016 09:27 AM, Created By: Maria Isabel Shaw   Task Name: Call Back   Assigned To: Warren Joseph   Regarding Patient: Winston Rendon, Status: Active   CommentGloriabetyfranck [facility] - 11 Feb 2016 9:27 AM     TASK CREATED  Caller: Jovani Billanabell; [phone]   Dr Warren Santos ordered testing on pt and they need a new diagnosis code b/c the one he used got denied  They would need a new code on a script  Called Homer Turner  Reached   Left message that new slip faxed to [facility] and gave her the ICD 10 code  Active Problems    1  Abdominal pain (789 00) (R10 9)   2  Bronchitis (490) (J40)   3  Chronic lymphocytic leukemia (204 10) (C91 10)   4  Coronary artery disease (414 00) (I25 10)   5  Dyslipidemia (272 4) (E78 5)   6  Dysphagia (787 20) (R13 10)   7  Esophagitis, reflux (530 11) (K21 0)   8  Heart disease (429 9) (I51 9)   9  Hemolytic anemia (283 9) (D58 9)   10  Leukopenia (288 50) (D72 819)   11  Muscle Cramps (729 82)   12  Neutropenia (288 00) (D70 9)   13  Thrombocytopenia (287 5) (D69 6)    Current Meds   1  Aspirin 325 MG CAPS; Therapy: (Recorded:07Mar2014) to Recorded   2  Carafate 1 GM/10ML Oral Suspension; TAKE 10 ML 4 TIMES DAILY; Therapy: 70MRR7816 to (03 17 74 30 53)  Requested for: 59Wdl8349; Last   Rx:83Tse8213 Ordered   3  Carafate 1 GM/10ML Oral Suspension; TAKE 10 ML 4 TIMES DAILY; Therapy: 49Dqm6443 to (Altaf Echeverria)  Requested for: 88Eul2380; Last   Rx:24Qjj4814 Ordered   4  Citalopram Hydrobromide 40 MG Oral Tablet; Therapy: (Recorded:02Jan2013) to Recorded   5  Fenofibrate Micronized 67 MG Oral Capsule; Therapy: (Recorded:02Jan2013) to Recorded   6  Folic Acid 1 MG Oral Tablet; Therapy: (Recorded:07Mar2014) to Recorded   7  Gabapentin 100 MG Oral Capsule; TAKE 1 CAPSULE 3 TIMES DAILY; Therapy: 74VDM5169 to (Cayla Simpson)  Requested for: 04Aug2015; Last   Rx:04Aug2015 Ordered   8   Levofloxacin 500 MG Oral Tablet (Levaquin); TAKE 1 TABLET DAILY AS DIRECTED; Therapy: 06HEB4650 to (Evaluate:76Riv4302)  Requested for: 28Jun2015; Last   NA:85EOU4120 Ordered   9  Metoprolol Tartrate 50 MG Oral Tablet; Therapy: 19TAI6186 to (Evaluate:22Bxc2781) Recorded   10  Pantoprazole Sodium 40 MG Oral Tablet Delayed Release (Protonix); TAKE 1 TABLET    TWICE A DAY; Therapy: 02FER0302 to (Last Rx:95Igj9211)  Requested for: 04Imw5911 Ordered   11  Plavix 75 MG Oral Tablet (Clopidogrel Bisulfate); Therapy: (Recorded:28Kav5600) to Recorded   12  PredniSONE 5 MG Oral Tablet; 1 by mouth daily or as directed  Take with food     Requested for: 17DPB8385; Last Rx:04Jan2016 Ordered   13  Ranexa 500 MG Oral Tablet Extended Release 12 Hour; Therapy: (Recorded:02Jan2013) to Recorded   14  Ranitidine HCl - 300 MG Oral Tablet; TAKE 1 TABLET DAILY AT DINNER; Therapy: 22Wti8099 to (Joanna Coello)  Requested for: 12Aug2015; Last    Rx:42Und8185 Ordered   15  Simvastatin 40 MG Oral Tablet; Therapy: (Recorded:18Apr2013) to Recorded   16  Ventolin  (90 Base) MCG/ACT Inhalation Aerosol Solution; INHALE 1 TO 2    PUFFS EVERY 4 TO 6 HOURS AS NEEDED; Therapy: 53MRC1606 to (Metro Lax)  Requested for: 86BSJ2920; Last    Rx:09Oct2013 Ordered   17  Wellbutrin 100 MG Oral Tablet (BuPROPion HCl); Therapy: (Recorded:06Jan2016) to Recorded    Allergies    1  Heparin   2   Macrolides    Signatures   Electronically signed by : Elia Paez RN; Feb 11 2016 10:04AM EST                       (Author)

## 2018-01-10 NOTE — MISCELLANEOUS
Message   Recorded as Task   Date: 03/02/2017 03:20 PM, Created By: Katia Glass   Task Name: Call Back   Assigned To: Kassie Islas   Regarding Patient: Mike Palacios, Status: Active   Comment:    TitoNazario bonnerCindy - 02 Mar 2017 3:20 PM     TASK CREATED  PT SAID HE JUST HEARD HE IS  [address] OR THE OTHER 3 DRUGS THAT THE CO OFFERS     WANTS TO KNOW WHAT DR Sheth Hawks HIM TO DO NOW    PLEASE CALL HIM AT [address] - 02 Mar 2017 5:03 PM     TASK EDITED  Called and left a message for the patient on his home answering machine  Dr Vincent Kayser will follow up with the patient on this before the patient's next appointment  Patient made aware to call the office back on Monday if he has not heard from Dr Vincent Kayser  Active Problems    1  Abdominal pain (789 00) (R10 9)   2  Bronchitis (490) (J40)   3  Chronic lymphocytic leukemia (204 10) (C91 10)   4  Chronic maxillary sinusitis (473 0) (J32 0)   5  Coronary artery disease (414 00) (I25 10)   6  Dyslipidemia (272 4) (E78 5)   7  Dysphagia (787 20) (R13 10)   8  Esophagitis, reflux (530 11) (K21 0)   9  Heart disease (429 9) (I51 9)   10  Hemolytic anemia (283 9) (D58 9)   11  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,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)   12  Leukopenia (288 50) (D72 819)   13  Muscle Cramps (729 82)   14  Neutropenia (288 00) (D70 9)   15  Thrombocytopenia (287 5) (D69 6)    Current Meds   1  Acyclovir TABS; 600mg twice a day; Therapy: (Recorded:31Hzb9566) to Recorded   2  Allopurinol 100 MG Oral Tablet; 2 daily  Stop if rash; Therapy: 65CAL5457 to (Last Rx:65Nim1670)  Requested for: 78Cmm9779 Ordered   3  Carafate 1 GM/10ML Oral Suspension; TAKE 10 ML 4 TIMES DAILY; Therapy: 91AAO9627 to (215 727 219)  Requested for: 56Jzh9411; Last   Rx:04Wty6912 Ordered   4  Clopidogrel Bisulfate 75 MG Oral Tablet; Take 1 tablet daily Recorded   5  Fenofibrate Micronized 67 MG Oral Capsule; Therapy: (Recorded:02Jan2013) to Recorded   6  Folic Acid 1 MG Oral Tablet; Therapy: (Recorded:07Mar2014) to Recorded   7  Gabapentin 100 MG Oral Capsule; TAKE 1 CAPSULE 3 TIMES DAILY; Therapy: 35HSA4794 to (Nikos Ruiz)  Requested for: 57Xat1023; Last   Rx:16Aiq0248 Ordered   8  GNP Potassium Gluconate 595 (99 K) MG Oral Tablet; Take 1 tablet daily Recorded   9  Lisinopril 20 MG Oral Tablet; Take 1 tablet daily Recorded   10  Metoprolol Tartrate 50 MG Oral Tablet; Therapy: 88GLK2650 to (Evaluate:22Myy7717) Recorded   11  Pantoprazole Sodium 40 MG Oral Tablet Delayed Release (Protonix); TAKE 1 TABLET    TWICE A DAY; Therapy: 06SHI3374 to (Last Rx:34Ryl4591)  Requested for: 88Xxi7609 Ordered   12  Plavix 75 MG Oral Tablet (Clopidogrel Bisulfate); Therapy: (Recorded:21Far1097) to Recorded   13  PredniSONE 10 MG Oral Tablet; one daily with food; Therapy: 82PIR0986 to (Romeo Almodovar)  Requested for: 14DXS6538; Last    Rx:30Nov2016 Ordered   14  PredniSONE 5 MG Oral Tablet; 1 by mouth daily or as directed  Take with food     Requested for: 93ELX5096; Last Rx:11Eww2164 Ordered   15  Ranexa 500 MG Oral Tablet Extended Release 12 Hour; Therapy: (Recorded:02Jan2013) to Recorded   16  Simvastatin 40 MG Oral Tablet; Therapy: (Recorded:18Apr2013) to Recorded   17  ValACYclovir HCl - 1 GM Oral Tablet; Therapy: (Recorded:30Nov2016) to Recorded   18  Ventolin  (90 Base) MCG/ACT Inhalation Aerosol Solution; INHALE 1 TO 2    PUFFS EVERY 4 TO 6 HOURS AS NEEDED; Therapy: 70ZMS0362 to (Renusarath Burgos)  Requested for: 29YWP8700; Last    Rx:59Ybc3572 Ordered    Allergies    1  Heparin   2  Macrolides    Signatures   Electronically signed by :  Chiki Ortez RN; Mar  2 2017  5:03PM EST                       (Author)

## 2018-01-10 NOTE — MISCELLANEOUS
Message   Recorded as Task   Date: 07/24/2017 02:45 PM, Created By: Aris Cline   Task Name: Care Coordination   Assigned To: Marissa Coates   Regarding Patient: Lily Alicia, Status: Active   Comment:    Brigida Mcdonnell - 24 Jul 2017 2:45 PM     TASK CREATED  patient received letter from [address] [place] [address] saying that he has had an exposure to TB and needs to be tested  please call to advise  [phone]    patient has chemo tomorrow   Sue Thomas - 24 Jul 2017 2:53 PM     TASK REASSIGNED: Previously Assigned To Tabitha Sosa - 24 Jul 2017 3:30 PM     TASK REPLIED TO: Previously Assigned To Lyndsay Diaz  patient needs to follow-up with PCP for TB test  He will have treatment tomorrow  Called pt  Reached   Left message per Raymond's direction  Active Problems    1  Abdominal pain (789 00) (R10 9)   2  Anemia (285 9) (D64 9)   3  Bronchitis (490) (J40)   4  Chemotherapy-induced nausea (787 02,E933 1) (R11 0,T45  1X5A)   5  Chronic lymphocytic leukemia (204 10) (C91 10)   6  Chronic maxillary sinusitis (473 0) (J32 0)   7  Coronary artery disease (414 00) (I25 10)   8  Dyslipidemia (272 4) (E78 5)   9  Dysphagia (787 20) (R13 10)   10  Esophagitis, reflux (530 11) (K21 0)   11  Heart disease (429 9) (I51 9)   12  Hemolytic anemia (283 9) (D58 9)   13  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,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)   14  History of hemolytic anemia (V12 3) (Z86 2)   15  Joint pain (719 40) (M25 50)   16  Leukopenia (288 50) (D72 819)   17  Lower extremity edema (782 3) (R60 0)   18  Muscle Cramps (729 82)   19  Neutropenia (288 00) (D70 9)   20  Thrombocytopenia (287 5) (D69 6)    Current Meds   1  Acyclovir 400 MG Oral Tablet; 1 tab po bid; Therapy: 30CBE1461 to (Last Rx:15Mar2017)  Requested for: 90SKV8592 Ordered   2  Acyclovir TABS; 600mg twice a day; Therapy: (Recorded:01Feb2017) to Recorded   3  Allopurinol 100 MG Oral Tablet; 2 daily  Stop if rash;    Therapy: 88ZTJ0223 to (Last Rx:22Feb2017) Requested for: 85Njb5980 Ordered   4  Benzonatate 200 MG Oral Capsule; TAKE 1 CAPSULE 3 TIMES DAILY AS NEEDED; Therapy: 90DAV3958 to (Evaluate:70Zou3192)  Requested for: 05PZX0465; Last   Rx:15Jun2017 Ordered   5  Clopidogrel Bisulfate 75 MG Oral Tablet; Take 1 tablet daily Recorded   6  Clotrimazole 10 MG Mouth/Throat Cindy; ALLOW 1 CINDY TO SLOWLY DISSOLVE   IN MOUTH  4 TIMES A DAY; Therapy: 94KHV6791 to (Evaluate:89Spb1160)  Requested for: 89CXF3225; Last   Rx:15Mar2017 Ordered   7  Fenofibrate 50 MG Oral Capsule; TAKE 1 CAPSULE DAILY WITH A MEAL; Therapy: (Recorded:05Apr2017) to Recorded   8  Folic Acid 1 MG Oral Tablet; Therapy: (Recorded:07Mar2014) to Recorded   9  Gabapentin 100 MG Oral Capsule; TAKE 1 CAPSULE 3 TIMES DAILY; Therapy: 82DQN1165 to (Indigo Beck)  Requested for: 81Vgf9528; Last   Rx:04Aug2015 Ordered   10  GNP Potassium Gluconate 595 (99 K) MG Oral Tablet; Take 1 tablet daily Recorded   11  LevoFLOXacin 750 MG Oral Tablet; TAKE AS DIRECTED; Therapy: (Recorded:05Apr2017) to Recorded   12  Lexapro 10 MG Oral Tablet (Escitalopram Oxalate); TAKE 1 TABLET DAILY; Therapy: (Recorded:05Apr2017) to Recorded   13  Lidocaine-Prilocaine 2 5-2 5 % External Cream; APPLY 1 INCH TO IV SITE PRIOR TO    CHEMO TREATMENT; Therapy: 90EVA2367 to (Last Rx:98Knd0973)  Requested for: 08Crw2511 Ordered   14  Lisinopril 20 MG Oral Tablet; Take 1 tablet daily Recorded   15  Metoprolol Tartrate 50 MG Oral Tablet; Therapy: 91GCN9641 to (Evaluate:87Ywq6095) Recorded   16  Ondansetron 8 MG Oral Tablet Disintegrating; 1 tab PO Q 8 hr PRN nausea; Therapy: 14TZA1845 to (Last Rx:15Mar2017)  Requested for: 53PYW1088 Ordered   17  Pantoprazole Sodium 40 MG Oral Tablet Delayed Release (Protonix); TAKE 1 TABLET    TWICE A DAY; Therapy: 20AQN0257 to (Last Rx:41Nqo9959)  Requested for: 50Tzh3760 Ordered   18  Plavix 75 MG Oral Tablet (Clopidogrel Bisulfate);     Therapy: (Recorded:45Nad6832) to Recorded   19  PredniSONE 10 MG Oral Tablet; one daily with food; Therapy: 52ISL4366 to (Adrián Quinonez)  Requested for: 79QKE4493; Last    Rx:30Nov2016 Ordered   20  PredniSONE 20 MG Oral Tablet; TAKE 2 TABLETS DAILY WITH FOOD AS DIRECTED; Therapy: 38EAW8336 to (Evaluate:82Bkc1455)  Requested for: 68SDG6897; Last    Rx:46Axz8266 Ordered   21  PredniSONE 5 MG Oral Tablet; 1 by mouth daily or as directed  Take with food     Requested for: 86HJR0120; Last Rx:05Ilb7350 Ordered   22  Ranexa 500 MG Oral Tablet Extended Release 12 Hour; Therapy: (Recorded:02Jan2013) to Recorded   23  TraMADol HCl - 50 MG Oral Tablet; TAKE 1 TABLET EVERY 6 HOURS AS NEEDED FOR    ONGOING PAIN;    Therapy: 33EHJ6395 to (Last Rx:93Ukz2598) Ordered   24  ValACYclovir HCl - 1 GM Oral Tablet; Therapy: (Recorded:30Nov2016) to Recorded   25  Venclexta 10 MG Oral Tablet; 12 tabs po daily; Therapy: 14Apr2017 to (Last Rx:28Ulp1305)  Requested for: 51CAF3782 Ordered   26  Ventolin  (90 Base) MCG/ACT Inhalation Aerosol Solution; INHALE 1 TO 2    PUFFS EVERY 4 TO 6 HOURS AS NEEDED; Therapy: 14JFB3754 to (Tawni Mohs)  Requested for: 49TDR4049; Last    Rx:40Jgr0250 Ordered    Allergies    1  Heparin   2   Macrolides    Signatures   Electronically signed by : Kostas Powell RN; Jul 24 2017  3:39PM EST                       (Author)

## 2018-01-10 NOTE — MISCELLANEOUS
Message   Recorded as Task   Date: 07/26/2017 03:08 PM, Created By: Eduardo Alvarez   Task Name: Call Back   Assigned To: Lyndsay Diaz   Regarding Patient: Eyal Menchaca, Status: Active   Comment:    Cindy Dyer - 26 Jul 2017 3:08 PM     TASK CREATED  CALL FROM PT  VERY CONFUSED ABOUT LABS YOU TOLD HIM HE NEEDED DRAWN     HE WOULD LIKE TO SPEAK TO [phone]   spoke with patient  he will have CBC, CMP and retic drawn on Friday 7/26/17  He received Imbruvica yesterday and began taking  He is feeling much better than last week  Advised to call with symptoms        Signatures   Electronically signed by : Cb Coronado, [facility]; Jul 26 2017  4:16PM EST                       (Author)

## 2018-01-11 NOTE — RESULT NOTES
Message  Pathology from Cayetano's recent esophageal biopsy shows herpes esophagitis, likely explaining why PPIs did not heal this ulcer  He is somewhat immunosuppressed by CLL, prednisone and rituxan  I have left a message for him on his phone and have called in a valtrex prescription  Valtrex 1gm Q12 for 14 days  This is a renally adjusted dose based on his last eGFR of 47 1        1 Amended By: Salvador Minor; Sep 09 2016 2:23 PM EST    Plan  Herpes simplex esophagitis    · Start: ValACYclovir HCl - 1 GM Oral Tablet; TAKE 1 TABLET 3 TIMES DAILY    Signatures   Electronically signed by :  TYSHAWN Bhakta ; Sep  9 2016  2:23PM EST                       (Author)

## 2018-01-11 NOTE — PROGRESS NOTES
Chief Complaint  Chief Complaint Free Text Note Form: Follow-up for persistent esophageal ulcer and equivocal Quantiferon TB gold  History of Present Illness  HPI: Since I last saw the patient in January after completion of 3 weeks of IV acyclovir, patient had repeat endoscopy with persistent HSV esophageal ulcer  Patient was supposed to see me in follow-up  However, He CLL worsened, requiring admission to [facility], starting new chemotherapy regimen and increase Prednisone dosage  His CLL is under control again  Patient is minimally symptomatic from the esophageal ulcer standpoint  He has no dysphasia and minimal odynophasia  Also, due to potential exposureTo a patient with active TB, patient was requested to undergo a QuantiFERON-TB Gold  This came back equivocal Patient has no respiratory symptoms  He had Weight loss due to CLL exacerbation  No night sweats  Review of Systems  Complete-Male:   Constitutional: feeling tired  Eyes: No complaints of eye pain, no red eyes, no discharge from eyes, no itchy eyes  ENT: no complaints of earache, no hearing loss, no nosebleeds, no nasal discharge, no sore throat, no hoarseness  Cardiovascular: No complaints of slow heart rate, no fast heart rate, no chest pain, no palpitations, no leg claudication, no lower extremity  Respiratory: No complaints of shortness of breath, no wheezing, no cough, no SOB on exertion, no orthopnea or PND  Gastrointestinal: Mild odynophagia  , but as noted in HPI  Genitourinary: No complaints of dysuria, no incontinence, no hesitancy, no nocturia, no genital lesion, no testicular pain  Musculoskeletal: No complaints of arthralgia, no myalgias, no joint swelling or stiffness, no limb pain or swelling  Integumentary: No complaints of skin rash or skin lesions, no itching, no skin wound, no dry skin     Neurological: No compliants of headache, no confusion, no convulsions, no numbness or tingling, no dizziness or fainting, no limb weakness, no difficulty walking  Psychiatric: Is not suicidal, no sleep disturbances, no anxiety or depression, no change in personality, no emotional problems  Endocrine: No complaints of proptosis, no hot flashes, no muscle weakness, no erectile dysfunction, no deepening of the voice, no feelings of weakness  Hematologic/Lymphatic: No complaints of swollen glands, no swollen glands in the neck, does not bleed easily, no easy bruising  Active Problems    1  Abdominal pain (789 00) (R10 9)   2  Anemia (285 9) (D64 9)   3  Bronchitis (490) (J40)   4  Chemotherapy-induced nausea (787 02,E933 1) (R11 0,T45  1X5A)   5  Chronic lymphocytic leukemia (204 10) (C91 10)   6  Chronic maxillary sinusitis (473 0) (J32 0)   7  Coronary artery disease (414 00) (I25 10)   8  Dyslipidemia (272 4) (E78 5)   9  Dysphagia (787 20) (R13 10)   10  Esophagitis, reflux (530 11) (K21 0)   11  Heart disease (429 9) (I51 9)   12  Hemolytic anemia (283 9) (D58 9)   13  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,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)   14  History of hemolytic anemia (V12 3) (Z86 2)   15  Joint pain (719 40) (M25 50)   16  Leukopenia (288 50) (D72 819)   17  Lower extremity edema (782 3) (R60 0)   18  Muscle Cramps (729 82)   19  Neutropenia (288 00) (D70 9)   20  Thrombocytopenia (287 5) (D69 6)    Past Medical History    1  History of Acute myocardial infarction (410 90) (I21 3)   2  History of Coronary Artery Disease (V12 59)   3  History of Dyslipidemia (272 4) (E78 5)   4  History of acute bronchitis (V12 69) (Z87 09)   5  History of hemolytic anemia (V12 3) (Z86 2)   6  History of hypertension (V12 59) (Z86 79)   7  History of neutropenia (V12 3) (Z86 2)   8  History of thrombocytopenia (V12 3) (Z86 2)    Surgical History    1  History of Cardiac Cath Lesion 1, 1st Adjunct Treat Device: Stent   2  History of Foot Surgery   3  History of Hand Surgery   4  History of Knee Surgery    Family History    1   Family history of Chronic Leukemia (V16 6)   2  Denied: Family history of Colon cancer   3  Denied: Family history of Crohn's disease   4  Denied: Family history of liver disease   5  Family history of Polyps Of The Sigmoid Colon    6  Denied: Family history of Colon cancer   7  Denied: Family history of Crohn's disease   8  Denied: Family history of liver disease    Social History    · Being A Social Drinker   · Current every day smoker (305 1) (F17 200)    Current Meds   1  Allopurinol 100 MG Oral Tablet; 2 daily  Stop if rash; Therapy: 74SAH1095 to (Last Rx:12Ner2678)  Requested for: 28Iwt4245 Ordered   2  Benzonatate 200 MG Oral Capsule; TAKE 1 CAPSULE 3 TIMES DAILY AS NEEDED; Therapy: 22SMM3332 to (Evaluate:02Bmg6327)  Requested for: 39QLZ0130; Last   Rx:15Jun2017 Ordered   3  Clopidogrel Bisulfate 75 MG Oral Tablet; Take 1 tablet daily Recorded   4  Clotrimazole 10 MG Mouth/Throat Cindy; ALLOW 1 CINDY TO SLOWLY DISSOLVE IN   MOUTH  4 TIMES A DAY; Therapy: 77CHG2691 to (Evaluate:60Hxx0871)  Requested for: 78JJV8173; Last   Rx:15Mar2017 Ordered   5  Deltasone 20 MG Oral Tablet; two daily with food; Therapy: 54UAU4066 to (Last Rx:76Kai5459)  Requested for: 23Qnf0900 Ordered   6  Fenofibrate 50 MG Oral Capsule; TAKE 1 CAPSULE DAILY WITH A MEAL; Therapy: (Recorded:05Apr2017) to Recorded   7  Folic Acid 1 MG Oral Tablet; Therapy: (Recorded:07Mar2014) to Recorded   8  Gabapentin 100 MG Oral Capsule; TAKE 1 CAPSULE 3 TIMES DAILY; Therapy: 23FVP5116 to (Grey Hines)  Requested for: 74Idr0129; Last   Rx:99Mxx0505 Ordered   9  GNP Potassium Gluconate 595 (99 K) MG Oral Tablet; Take 1 tablet daily Recorded   10  Lexapro 10 MG Oral Tablet; TAKE 1 TABLET DAILY; Therapy: (Recorded:05Apr2017) to Recorded   11  Lidocaine-Prilocaine 2 5-2 5 % External Cream; APPLY 1 INCH TO IV SITE PRIOR TO    CHEMO TREATMENT; Therapy: 99YMW0929 to (Last Rx:87Rjy5081)  Requested for: 79Sur4403 Ordered   12   Lisinopril 20 MG Oral Tablet; Take 1 tablet daily Recorded   13  Metoprolol Tartrate 50 MG Oral Tablet; Therapy: 04AUW9297 to (Evaluate:96Gzs1103) Recorded   14  Ondansetron 8 MG Oral Tablet Disintegrating; 1 tab PO Q 8 hr PRN nausea; Therapy: 18TZD8362 to (Last Rx:15Mar2017)  Requested for: 15JRS1393 Ordered   15  Pantoprazole Sodium 40 MG Oral Tablet Delayed Release; TAKE 1 TABLET TWICE A DAY; Therapy: 71KIA0986 to (Last Rx:29Rmk7409)  Requested for: 95Mzz9018 Ordered   16  Plavix 75 MG Oral Tablet; Therapy: (Recorded:60Mdx8339) to Recorded   17  PredniSONE 20 MG Oral Tablet; TAKE 2 TABLETS DAILY WITH FOOD AS DIRECTED; Therapy: 69EPT6722 to (Evaluate:04Mfs3507)  Requested for: 68MGS0747; Last    Rx:70Tfk2221 Ordered   18  Ranexa 500 MG Oral Tablet Extended Release 12 Hour; Therapy: (Recorded:02Jan2013) to Recorded   19  TraMADol HCl - 50 MG Oral Tablet; TAKE 1 TABLET EVERY 6 HOURS AS NEEDED FOR    ONGOING PAIN;    Therapy: 48SLP4375 to (Last Rx:17Bfj0800) Ordered   20  Venclexta 10 MG Oral Tablet; 12 tabs po daily; Therapy: 57Mcu1394 to (Last Rx:61Rxi5327)  Requested for: 84VMF2247 Ordered   21  Ventolin  (90 Base) MCG/ACT Inhalation Aerosol Solution; INHALE 1 TO 2 PUFFS    EVERY 4 TO 6 HOURS AS NEEDED; Therapy: 25JXH7437 to ([facility])  Requested for: 18KQF1319; Last    Rx:76Abq5339 Ordered    Allergies    1  Heparin    Vitals  Signs   Recorded: 21Aug2017 03:25PM   Temperature: 97 5 F  Heart Rate: 81  Respiration: 18  Systolic: 182  Diastolic: 70  Height: 6 ft   Weight: 209 lb 3 2 oz  BMI Calculated: 28 37  BSA Calculated: 2 17  O2 Saturation: 99    Physical Exam    Constitutional   General appearance: No acute distress, well appearing and well nourished  chronically ill, well nourished, within normal limits of ideal weight, well hydrated and appearance reflects stated age  Pulmonary   Respiratory effort: No increased work of breathing or signs of respiratory distress    Respiratory rate: normal  Assessment of respiratory effort revealed normal rhythm and effort  Auscultation of lungs: Clear to auscultation, equal breath sounds bilaterally, no wheezes, no rales, no rhonci  no rales or crackles were heard bilaterally  no rhonchi  no friction rub  no wheezing  Cardiovascular   Auscultation of heart: Normal rate and rhythm, normal S1 and S2, without murmurs  The heart rate was normal  The rhythm was regular  Heart sounds: normal S1 and normal S2  no murmurs were heard  Abdomen   Abdomen: Non-tender, no masses  The abdomen was flat  Bowel sounds were normal  The abdomen was soft and nontender  Liver and spleen: No hepatomegaly or splenomegaly  No hepatosplenomegaly  Assessment    1  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) (R76 12)   2  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,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)   3  Chronic lymphocytic leukemia (204 10) (C91 10)   4  Chronic kidney disease, unspecified CKD stage (585 9) (N18 9)    Discussion/Summary  Discussion Summary:   1  Equivocal QuantiFERON-TB Gold  Test is equivocal due to patient's anergy from high-dose prednisone  Patient has low risk for TB  At this point, I would monitor him for development of any respiratory or constitutional symptoms  There is very low utility of repeating the QuantiFERON TB test, due to patient's ongoing anergy from chronic prednisone use  2  HSV esophagitis  Patient completed 3 weeks of IV acyclovir, without significant change and esophageal ulcer  Patient is minimally symptomatic  Option at this point would be using IV foscarnet, which has significant renal and hematological toxicities  With patient having underlying CK-MB and existing hematological abnormalities of the CLL,IV foscarnet and would have significant risk  Given minimal symptoms, I would observe patient with serial symptom review and EGD  If symptoms worsen or ulcerations worsen on EGD, we can consider another course of treatment then  3  CLL   Patient is now on new chemotherapy regimen and high-dose prednisone  He is significantly immunosuppressed  Fortunately, his CLL is better controlled now  4  CKD  Creatinine is stable/baseline  IV antiviral is high risk for worsening renal insufficiency, especially in this patient currently on renally toxic chemotherapeutic agent  Monitor respiratory and constitutional symptoms  Monitor dysphasia and odynophagia  Serial EGD, q 6 to12 months  Consider IV foscarnet if the esophagitis symptoms worsen or esophageal ulcer worsens with serial EGDs  Follow-up with me p r n  [facility]  Esophagitis, reflux   EGD; every 3 months; Last 12Aug2015; Next Due: 43ENC0731; Overdue    Future Appointments    Date/Time Provider Specialty Site   09/20/2017 09:00 AM Chelsea Mejia MD Hematology Oncology CANCER [facility] MEDICAL ONCOLOGY     Signatures   Electronically signed by :  TYSHAWN Zamora ; Aug 21 2017  4:07PM EST                       (Author)

## 2018-01-11 NOTE — MISCELLANEOUS
Message   Recorded as Task   Date: 09/29/2017 09:55 AM, Created By: Mg Lamas   Task Name: Call Back   Assigned To: Marcia Green   Regarding Patient: Joshua Hendrix, Status: Active   CommentJoann Cranker - 29 Sep 2017 9:55 AM     TASK CREATED  Caller: Self; [phone] (Mobile Phone)  Pt said he can not get his chemo drug b/c his insurance won't pay for it he has a $950 copay and he does not have the money for it  He said he has enough for tomorrow and than he is not going to take it anymore b/c he can not afford meds  Sue Thomas - 29 Sep 2017 10:18 AM     TASK REASSIGNED: Previously Assigned To Sue Thomas  Pt can not afford Imbruvica 2 tabs daily  Is being filled by [facility]  Pt states has left 2 messages for you  Please reach out Madelyn Carlson - 02 Oct 2017 2:19 PM     TASK REPLIED TO: Previously Assigned To Madelyn Burk  I spoke to pt a few times & i saw him this morning    i'm contacting the [employer] to see if he still has funding avail  i just told him i will call him back  i'll let you know what happens   Noted  Active Problems    1  Abdominal pain (789 00) (R10 9)   2  Anemia (285 9) (D64 9)   3  Cellulitis of hand without finger or thumb, right (682 4) (L03 113)   4  Chemotherapy-induced nausea (787 02,E933 1) (R11 0,T45  1X5A)   5  Chronic kidney disease, unspecified CKD stage (585 9) (N18 9)   6  Chronic lymphocytic leukemia (204 10) (C91 10)   7  Chronic maxillary sinusitis (473 0) (J32 0)   8  Coronary artery disease (414 00) (I25 10)   9  Dyslipidemia (272 4) (E78 5)   10  Dysphagia (787 20) (R13 10)   11  Esophagitis, reflux (530 11) (K21 0)   12  Heart disease (429 9) (I51 9)   13  Hemolytic anemia (283 9) (D58 9)   14  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,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)   15  History of hemolytic anemia (V12 3) (Z86 2)   16  Hypokalemia (276 8) (E87 6)   17  Joint pain (719 40) (M25 50)   18  Leukopenia (288 50) (D72 819)   19   Lower extremity edema (782  3) (R60 0)   20  Muscle Cramps (729 82)   21  Neutropenia (288 00) (D70 9)   22  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) (R76 12)   23  Thrombocytopenia (287 5) (D69 6)   24  Ulcer of esophagus without bleeding (530 20) (K22 10)    Current Meds   1  Aspirin 81 MG Oral Tablet Delayed Release; Therapy: 08Qbx0647 to Recorded   2  Benzonatate 200 MG Oral Capsule; TAKE 1 CAPSULE 3 TIMES DAILY AS NEEDED; Therapy: 00QJB5577 to (Evaluate:07Oct2017)  Requested for: 46Tlq3292; Last   Rx:45Fvf0528 Ordered   3  Citalopram Hydrobromide 40 MG Oral Tablet; Therapy: 72Nwy1769 to Recorded   4  Clotrimazole 10 MG Mouth/Throat Cindy; ALLOW 1 CINDY TO SLOWLY DISSOLVE   IN MOUTH  4 TIMES A DAY; Therapy: 88ADY5645 to (Evaluate:58Mbd9894)  Requested for: 46HSY8994; Last   Rx:15Mar2017 Ordered   5  Doxycycline Hyclate 100 MG Oral Tablet; TAKE 1 TABLET EVERY 12 HOURS DAILY; Therapy: 84KUI7377 to (Evaluate:25Szf9738)  Requested for: 56Lgi7686; Last   Rx:05Sep2017 Ordered   6  Fenofibrate 50 MG Oral Capsule; TAKE 1 CAPSULE DAILY WITH A MEAL; Therapy: (Recorded:05Apr2017) to Recorded   7  Folic Acid 1 MG Oral Tablet; Therapy: (Recorded:07Mar2014) to Recorded   8  Gabapentin 100 MG Oral Capsule; TAKE 1 CAPSULE 3 TIMES DAILY; Therapy: 53YUD2481 to ([facility])  Requested for: 42Lqs3967; Last   Rx:29Utq7191 Ordered   9  Gazyva 1000 MG/40ML Intravenous Solution; Therapy: 68Lfi4537 to Recorded   10  GNP Potassium Gluconate 595 (99 K) MG Oral Tablet; Take 1 tablet daily Recorded   11  Imbruvica 140 MG Oral Capsule; TAKE 2 CAPSULE Daily; Therapy: 70Oqb8806 to (Evaluate:05Vzu6954)  Requested for: 89Clw5023; Last    Rx:49Yzr1437 Ordered   12  Ondansetron 8 MG Oral Tablet Disintegrating; 1 tab PO Q 8 hr PRN nausea; Therapy: 65KNC7420 to (Last Rx:15Mar2017)  Requested for: 40YHP8405 Ordered   13  Pantoprazole Sodium 40 MG Oral Tablet Delayed Release (Protonix); TAKE 1 TABLET    TWICE A DAY;     Therapy: 04UGU8056 to (Last Rx:49Rlk4382)  Requested for: 98Pyr7732 Ordered   14  Potassium Chloride ER 10 MEQ Oral Tablet Extended Release; one daily  or as advised; Therapy: 64LSX5350 to (03 17 74 30 53)  Requested for: 92UOL9813; Last    Rx:27Nch8094 Ordered   15  PredniSONE 20 MG Oral Tablet; TAKE 2 TABLETS DAILY WITH FOOD AS DIRECTED; Therapy: 06NGS0616 to (Evaluate:69Baa1667)  Requested for: 27DMY5424; Last    Rx:21Guw9349 Ordered   16  TraMADol HCl - 50 MG Oral Tablet; TAKE 1 TABLET EVERY 6 HOURS AS NEEDED FOR    ONGOING PAIN;    Therapy: 26KDD2904 to (Last Rx:41Sot6219) Ordered    Allergies    1   Heparin    Signatures   Electronically signed by : Kt Ireland RN; Oct  2 2017  2:34PM EST                       (Author)

## 2018-01-11 NOTE — MISCELLANEOUS
Message   Recorded as Task   Date: 03/09/2017 09:22 AM, Created By: Michell Herrera   Task Name: Call Back   Assigned To: Tanja Yanez   Regarding Patient: Sy Leiva, Status: Active   Comment:    MandeeplloydEdinson bonnerCindy - 09 Mar 2017 9:22 AM     TASK CREATED  CALL FROM PT SAYING HE HAD VERY BLACK STOOL THIS MORNING AND IS CONCERNED BECAUSE IT HAS NEVER HAPPENED BEFORE    PLEASE CALL HIM  [address] - 09 Mar 2017 9:41 AM     TASK COMPLETED   Candice Krueger - 09 Mar 2017 10:10 AM     TASK REACTIVATED   Candice Krueger - 09 Mar 2017 10:10 AM     TASK REPLIED TO: Previously Assigned To Sue Thomas   Pt called back and wants a call immediately from Dr Hu Ibarra regarding his hospitalization  [phone]   Dr Hu Ibarra calling pt  Active Problems    1  Abdominal pain (789 00) (R10 9)   2  Bronchitis (490) (J40)   3  Chronic lymphocytic leukemia (204 10) (C91 10)   4  Chronic maxillary sinusitis (473 0) (J32 0)   5  Coronary artery disease (414 00) (I25 10)   6  Dyslipidemia (272 4) (E78 5)   7  Dysphagia (787 20) (R13 10)   8  Esophagitis, reflux (530 11) (K21 0)   9  Heart disease (429 9) (I51 9)   10  Hemolytic anemia (283 9) (D58 9)   11  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,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)   12  Leukopenia (288 50) (D72 819)   13  Muscle Cramps (729 82)   14  Neutropenia (288 00) (D70 9)   15  Thrombocytopenia (287 5) (D69 6)    Current Meds   1  Acyclovir TABS; 600mg twice a day; Therapy: (Recorded:71Cgm0557) to Recorded   2  Allopurinol 100 MG Oral Tablet; 2 daily  Stop if rash; Therapy: 40XFU6698 to (Last Rx:37Ixm8285)  Requested for: 04Aqv3713 Ordered   3  Carafate 1 GM/10ML Oral Suspension; TAKE 10 ML 4 TIMES DAILY; Therapy: 13HHX1150 to (Nena Cain)  Requested for: 38Pre6199; Last   Rx:69Pwu4800 Ordered   4  Clopidogrel Bisulfate 75 MG Oral Tablet; Take 1 tablet daily Recorded   5  Fenofibrate Micronized 67 MG Oral Capsule; Therapy: (Recorded:02Jan2013) to Recorded   6   Folic Acid 1 MG Oral Tablet; Therapy: (Recorded:07Mar2014) to Recorded   7  Gabapentin 100 MG Oral Capsule; TAKE 1 CAPSULE 3 TIMES DAILY; Therapy: 45BEW8780 to (Christina Holliday)  Requested for: 81Gnw8896; Last   Rx:77Qrh4586 Ordered   8  GNP Potassium Gluconate 595 (99 K) MG Oral Tablet; Take 1 tablet daily Recorded   9  Lisinopril 20 MG Oral Tablet; Take 1 tablet daily Recorded   10  Metoprolol Tartrate 50 MG Oral Tablet; Therapy: 90XVV1779 to (Evaluate:57Xcf2665) Recorded   11  Pantoprazole Sodium 40 MG Oral Tablet Delayed Release (Protonix); TAKE 1 TABLET    TWICE A DAY; Therapy: 51JBY0006 to (Last Rx:66Hkk2812)  Requested for: 07Kli8351 Ordered   12  Plavix 75 MG Oral Tablet (Clopidogrel Bisulfate); Therapy: (Recorded:18Jkq8103) to Recorded   13  PredniSONE 10 MG Oral Tablet; one daily with food; Therapy: 92DOK4199 to (Chet Rinne)  Requested for: 60LXO3015; Last    Rx:30Nov2016 Ordered   14  PredniSONE 5 MG Oral Tablet; 1 by mouth daily or as directed  Take with food     Requested for: 98PKY3806; Last Rx:90Qkf6508 Ordered   15  Ranexa 500 MG Oral Tablet Extended Release 12 Hour; Therapy: (Recorded:02Jan2013) to Recorded   16  Simvastatin 40 MG Oral Tablet; Therapy: (Recorded:18Apr2013) to Recorded   17  ValACYclovir HCl - 1 GM Oral Tablet; Therapy: (Recorded:30Nov2016) to Recorded   18  Ventolin  (90 Base) MCG/ACT Inhalation Aerosol Solution; INHALE 1 TO 2    PUFFS EVERY 4 TO 6 HOURS AS NEEDED; Therapy: 20KMY7050 to (Cori Sr)  Requested for: 07XKX3377; Last    Rx:45Gix2539 Ordered    Allergies    1  Heparin   2   Macrolides    Signatures   Electronically signed by : Luis Miguel Colin RN; Mar  9 2017 10:13AM EST                       (Author)

## 2018-01-12 ENCOUNTER — GENERIC CONVERSION - ENCOUNTER (OUTPATIENT)
Dept: OTHER | Facility: OTHER | Age: 63
End: 2018-01-12

## 2018-01-12 VITALS
TEMPERATURE: 97.9 F | HEART RATE: 87 BPM | WEIGHT: 193 LBS | DIASTOLIC BLOOD PRESSURE: 82 MMHG | RESPIRATION RATE: 16 BRPM | BODY MASS INDEX: 26.14 KG/M2 | HEIGHT: 72 IN | OXYGEN SATURATION: 94 % | SYSTOLIC BLOOD PRESSURE: 124 MMHG

## 2018-01-12 VITALS
OXYGEN SATURATION: 100 % | HEIGHT: 72 IN | BODY MASS INDEX: 26.14 KG/M2 | SYSTOLIC BLOOD PRESSURE: 122 MMHG | HEART RATE: 88 BPM | TEMPERATURE: 98.2 F | DIASTOLIC BLOOD PRESSURE: 80 MMHG | WEIGHT: 193 LBS | RESPIRATION RATE: 18 BRPM

## 2018-01-12 VITALS
DIASTOLIC BLOOD PRESSURE: 74 MMHG | BODY MASS INDEX: 26.68 KG/M2 | HEIGHT: 72 IN | HEART RATE: 72 BPM | RESPIRATION RATE: 16 BRPM | OXYGEN SATURATION: 100 % | TEMPERATURE: 97.1 F | WEIGHT: 197 LBS | SYSTOLIC BLOOD PRESSURE: 124 MMHG

## 2018-01-12 NOTE — MISCELLANEOUS
Message   Recorded as Task   Date: 05/04/2017 09:19 AM, Created By: Cristiana Woods   Task Name: Call Back   Assigned To: Sue Thomas   Regarding Patient: Mónica Duke, Status: Active   CommentMiguel Ángel Rise - 04 May 2017 9:19 AM     TASK CREATED  Caller: Self; Renew Medication; [phone] (Home)  PATIENT CALLED BECAUSE HE ONLY HAS ABOUT 4 DAYS LEFT OF MEDICATION  HE WOULD ALSO LIKE FOR YOU TO GIVE HIM A CALL IF POSSIBLE  Called pt  Reached VM  Left message that new script was sent yesterday and can check with [facility] about delivery  Active Problems    1  Abdominal pain (789 00) (R10 9)   2  Bronchitis (490) (J40)   3  Chemotherapy-induced nausea (787 02,E933 1) (R11 0,T45  1X5A)   4  Chronic lymphocytic leukemia (204 10) (C91 10)   5  Chronic maxillary sinusitis (473 0) (J32 0)   6  Coronary artery disease (414 00) (I25 10)   7  Dyslipidemia (272 4) (E78 5)   8  Dysphagia (787 20) (R13 10)   9  Esophagitis, reflux (530 11) (K21 0)   10  Heart disease (429 9) (I51 9)   11  Hemolytic anemia (283 9) (D58 9)   12  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,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)   13  Leukopenia (288 50) (D72 819)   14  Lower extremity edema (782 3) (R60 0)   15  Muscle Cramps (729 82)   16  Neutropenia (288 00) (D70 9)   17  Thrombocytopenia (287 5) (D69 6)    Current Meds   1  Acyclovir 400 MG Oral Tablet; 1 tab po bid; Therapy: 24BHA5367 to (Last Rx:15Mar2017)  Requested for: 70IXH5354 Ordered   2  Acyclovir TABS; 600mg twice a day; Therapy: (Recorded:01Squ8336) to Recorded   3  Allopurinol 100 MG Oral Tablet; 2 daily  Stop if rash; Therapy: 72CQZ7500 to (Last Rx:39Xec9521)  Requested for: 22Feb2017 Ordered   4  Clopidogrel Bisulfate 75 MG Oral Tablet; Take 1 tablet daily Recorded   5  Clotrimazole 10 MG Mouth/Throat Cindy; ALLOW 1 CINDY TO SLOWLY DISSOLVE   IN MOUTH  4 TIMES A DAY; Therapy: 79TWB5122 to (Evaluate:82Yfh7931)  Requested for: 16GVQ6608; Last   Rx:15Mar2017 Ordered   6   Fenofibrate 50 MG Oral Capsule; TAKE 1 CAPSULE DAILY WITH A MEAL; Therapy: (Recorded:05Apr2017) to Recorded   7  Folic Acid 1 MG Oral Tablet; Therapy: (Recorded:07Mar2014) to Recorded   8  Gabapentin 100 MG Oral Capsule; TAKE 1 CAPSULE 3 TIMES DAILY; Therapy: 84NVS2037 to (Eduardo Hood)  Requested for: 71Lvv6180; Last   Rx:57Huh4948 Ordered   9  GNP Potassium Gluconate 595 (99 K) MG Oral Tablet; Take 1 tablet daily Recorded   10  LevoFLOXacin 750 MG Oral Tablet; TAKE AS DIRECTED; Therapy: (Recorded:05Apr2017) to Recorded   11  Lexapro 10 MG Oral Tablet (Escitalopram Oxalate); TAKE 1 TABLET DAILY; Therapy: (Recorded:05Apr2017) to Recorded   12  Lisinopril 20 MG Oral Tablet; Take 1 tablet daily Recorded   13  Metoprolol Tartrate 50 MG Oral Tablet; Therapy: 33LLP0457 to (Evaluate:24Fze1438) Recorded   14  Ondansetron 8 MG Oral Tablet Dispersible; 1 tab PO Q 8 hr PRN nausea; Therapy: 74SLG4285 to (Last Rx:15Mar2017)  Requested for: 06UEG7314 Ordered   15  Pantoprazole Sodium 40 MG Oral Tablet Delayed Release (Protonix); TAKE 1 TABLET    TWICE A DAY; Therapy: 25PVB9272 to (Last Rx:55Qgg2306)  Requested for: 73Bfm0524 Ordered   16  Plavix 75 MG Oral Tablet (Clopidogrel Bisulfate); Therapy: (Recorded:07Gln8885) to Recorded   17  PredniSONE 10 MG Oral Tablet; one daily with food; Therapy: 32VXT2449 to (Reynaldo Valentino)  Requested for: 33FLS2528; Last    Rx:30Nov2016 Ordered   18  PredniSONE 5 MG Oral Tablet; 1 by mouth daily or as directed  Take with food     Requested for: 04HRY7566; Last Rx:62Lkq7812 Ordered   19  Ranexa 500 MG Oral Tablet Extended Release 12 Hour; Therapy: (Recorded:02Jan2013) to Recorded   20  ValACYclovir HCl - 1 GM Oral Tablet; Therapy: (Recorded:30Nov2016) to Recorded   21  Venclexta 10 MG Oral Tablet; 6  TABS PO DAILY X 1 WEEK THEN 8 TABS PO DAILY X 1    WEEK THEN 10 TABS PO DAILY X 1 WEEK THEN 12 TABS WEEKLY X 1 WEEK;     Therapy: 23SDR2801 to  Requested for: 15PZP3227 Recorded   22  Ventolin  (90 Base) MCG/ACT Inhalation Aerosol Solution; INHALE 1 TO 2    PUFFS EVERY 4 TO 6 HOURS AS NEEDED; Therapy: 80LSK6938 to (Donovan Kelly)  Requested for: 02TEX4790; Last    Rx:67Tss7336 Ordered    Allergies    1  Heparin   2   Macrolides    Signatures   Electronically signed by : Whitley Lomas RN; May  4 2017  9:37AM EST                       (Author)

## 2018-01-12 NOTE — MISCELLANEOUS
Message   Recorded as Task   Date: 02/28/2017 11:09 AM, Created By: Ellen Don   Task Name: Call Back   Assigned To: Alyssa Camara   Regarding Patient: Marcelo Rao, Status: In Progress   LisaLory Wright - 28 Feb 2017 11:09 AM     TASK CREATED  patient of Dr Mike Murray  He is having a problem with the Imbruvica  He would like a call back at [phone]   [facility] - 28 Feb 2017 1:42 PM     TASK EDITED   Paralee Boom - 28 Feb 2017 2:10 PM     TASK REPLIED TO: Previously Assigned To Alyssa Camara  Rec'vd cll from pt req that we call him back at [phone]  Pt was adamant that we call him back ASAP   Alyssa Camara - 28 Feb 2017 2:14 PM     TASK IN PROGRESS   Alyssa Camara - 28 Feb 2017 2:16 PM     TASK EDITED  left v/m for patient to call me back with his concerns  Alyssa Camara - 28 Feb 2017 2:35 PM     TASK EDITED  spoke with patient, he is confused over phone calls he is getting from the pharmacy  Will check into this and call him back  He has my direct # to call as well  Alyssa Camara - 01 Mar 2017 2:04 PM     TASK EDITED  Per [facility], Exp scripts rejected coverage, they advised pt to call to add himself to the plan, only dependents are covered on the plan  They provided a ph# to him yesterday and he was instructed to get PICA benefit (prescription drug benefit)  [facility] is checking into this as how can dependents have coverage, but not the card mcrae? They will call/fax update  will discuss with Raymond/Dr Miri Harris        Active Problems    1  Abdominal pain (789 00) (R10 9)   2  Bronchitis (490) (J40)   3  Chronic lymphocytic leukemia (204 10) (C91 10)   4  Chronic maxillary sinusitis (473 0) (J32 0)   5  Coronary artery disease (414 00) (I25 10)   6  Dyslipidemia (272 4) (E78 5)   7  Dysphagia (787 20) (R13 10)   8  Esophagitis, reflux (530 11) (K21 0)   9  Heart disease (429 9) (I51 9)   10  Hemolytic anemia (283 9) (D58 9)   11   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,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)   12  Leukopenia (288 50) (D72 819)   13  Muscle Cramps (729 82)   14  Neutropenia (288 00) (D70 9)   15  Thrombocytopenia (287 5) (D69 6)    Current Meds   1  Acyclovir TABS; 600mg twice a day; Therapy: (Recorded:57Btq6011) to Recorded   2  Allopurinol 100 MG Oral Tablet; 2 daily  Stop if rash; Therapy: 91TBJ5228 to (Last Rx:75Jlw6100)  Requested for: 64Vrt7514 Ordered   3  Carafate 1 GM/10ML Oral Suspension; TAKE 10 ML 4 TIMES DAILY; Therapy: 19FIZ3964 to (Eliberto Klinefelter)  Requested for: 41Dbw5548; Last   Rx:00Qnj2490 Ordered   4  Clopidogrel Bisulfate 75 MG Oral Tablet; Take 1 tablet daily Recorded   5  Fenofibrate Micronized 67 MG Oral Capsule; Therapy: (Recorded:02Jan2013) to Recorded   6  Folic Acid 1 MG Oral Tablet; Therapy: (Recorded:07Mar2014) to Recorded   7  Gabapentin 100 MG Oral Capsule; TAKE 1 CAPSULE 3 TIMES DAILY; Therapy: 52OHW3724 to (Andry hWite)  Requested for: 23Whu6860; Last   Rx:43Cwt0003 Ordered   8  GNP Potassium Gluconate 595 (99 K) MG Oral Tablet; Take 1 tablet daily Recorded   9  Lisinopril 20 MG Oral Tablet; Take 1 tablet daily Recorded   10  Metoprolol Tartrate 50 MG Oral Tablet; Therapy: 36JJS9465 to (Evaluate:57Oyb7348) Recorded   11  Pantoprazole Sodium 40 MG Oral Tablet Delayed Release (Protonix); TAKE 1 TABLET    TWICE A DAY; Therapy: 73SYH7637 to (Last Rx:77Mfz3897)  Requested for: 22Smx0258 Ordered   12  Plavix 75 MG Oral Tablet (Clopidogrel Bisulfate); Therapy: (Recorded:37Srx5161) to Recorded   13  PredniSONE 10 MG Oral Tablet; one daily with food; Therapy: 72AHQ7342 to (Blinda Doug)  Requested for: 89EXE2046; Last    Rx:30Nov2016 Ordered   14  PredniSONE 5 MG Oral Tablet; 1 by mouth daily or as directed  Take with food     Requested for: 97VPO3609; Last Rx:90Sxg4854 Ordered   15  Ranexa 500 MG Oral Tablet Extended Release 12 Hour; Therapy: (Recorded:02Jan2013) to Recorded   16   Simvastatin 40 MG Oral Tablet; Therapy: (Recorded:90Rts7785) to Recorded   17  ValACYclovir HCl - 1 GM Oral Tablet; Therapy: (Recorded:32Sco9731) to Recorded   18  Ventolin  (90 Base) MCG/ACT Inhalation Aerosol Solution; INHALE 1 TO 2    PUFFS EVERY 4 TO 6 HOURS AS NEEDED; Therapy: 40BNE2494 to (Jocelyne Amato)  Requested for: 55EVP3760; Last    Rx:09Oct2013 Ordered    Allergies    1  Heparin   2   Macrolides    Signatures   Electronically signed by : Viky Gutierrez, ; Mar  1 2017  2:26PM EST                       (Author)

## 2018-01-13 VITALS
DIASTOLIC BLOOD PRESSURE: 72 MMHG | SYSTOLIC BLOOD PRESSURE: 124 MMHG | RESPIRATION RATE: 18 BRPM | TEMPERATURE: 96.7 F | HEIGHT: 72 IN | HEART RATE: 113 BPM | WEIGHT: 177.5 LBS | BODY MASS INDEX: 24.04 KG/M2

## 2018-01-13 VITALS
OXYGEN SATURATION: 99 % | HEIGHT: 72 IN | WEIGHT: 194.5 LBS | SYSTOLIC BLOOD PRESSURE: 122 MMHG | BODY MASS INDEX: 26.34 KG/M2 | HEART RATE: 101 BPM | RESPIRATION RATE: 16 BRPM | TEMPERATURE: 98.8 F | DIASTOLIC BLOOD PRESSURE: 74 MMHG

## 2018-01-13 VITALS
OXYGEN SATURATION: 99 % | BODY MASS INDEX: 26.28 KG/M2 | HEART RATE: 80 BPM | TEMPERATURE: 97.1 F | DIASTOLIC BLOOD PRESSURE: 80 MMHG | WEIGHT: 194 LBS | HEIGHT: 72 IN | SYSTOLIC BLOOD PRESSURE: 122 MMHG | RESPIRATION RATE: 18 BRPM

## 2018-01-13 VITALS
WEIGHT: 200.38 LBS | DIASTOLIC BLOOD PRESSURE: 60 MMHG | TEMPERATURE: 97.6 F | HEART RATE: 82 BPM | HEIGHT: 72 IN | SYSTOLIC BLOOD PRESSURE: 124 MMHG | RESPIRATION RATE: 16 BRPM | BODY MASS INDEX: 27.14 KG/M2 | OXYGEN SATURATION: 98 %

## 2018-01-13 VITALS
WEIGHT: 205 LBS | DIASTOLIC BLOOD PRESSURE: 72 MMHG | TEMPERATURE: 98.3 F | OXYGEN SATURATION: 98 % | BODY MASS INDEX: 27.77 KG/M2 | HEART RATE: 79 BPM | RESPIRATION RATE: 18 BRPM | HEIGHT: 72 IN | SYSTOLIC BLOOD PRESSURE: 130 MMHG

## 2018-01-13 VITALS
DIASTOLIC BLOOD PRESSURE: 72 MMHG | BODY MASS INDEX: 28.24 KG/M2 | HEART RATE: 94 BPM | SYSTOLIC BLOOD PRESSURE: 124 MMHG | HEIGHT: 72 IN | WEIGHT: 208.5 LBS

## 2018-01-13 NOTE — MISCELLANEOUS
Message   Recorded as Task   Date: 09/19/2017 12:40 PM, Created By: Severiano Mano   Task Name: Miscellaneous   Assigned To: Sue Thomas   Regarding Patient: Eneida Bey, Status: Active   Garth Pineda - 19 Sep 2017 12:40 PM     TASK CREATED  FYI  patient called to cancel apt for tomorrow morning  he is being admitted to the hospital  He will call us when he is discharged  Noted  Active Problems    1  Abdominal pain (789 00) (R10 9)   2  Anemia (285 9) (D64 9)   3  Cellulitis of hand without finger or thumb, right (682 4) (L03 113)   4  Chemotherapy-induced nausea (787 02,E933 1) (R11 0,T45  1X5A)   5  Chronic kidney disease, unspecified CKD stage (585 9) (N18 9)   6  Chronic lymphocytic leukemia (204 10) (C91 10)   7  Chronic maxillary sinusitis (473 0) (J32 0)   8  Coronary artery disease (414 00) (I25 10)   9  Dyslipidemia (272 4) (E78 5)   10  Dysphagia (787 20) (R13 10)   11  Esophagitis, reflux (530 11) (K21 0)   12  Heart disease (429 9) (I51 9)   13  Hemolytic anemia (283 9) (D58 9)   14  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,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)   15  History of hemolytic anemia (V12 3) (Z86 2)   16  Joint pain (719 40) (M25 50)   17  Leukopenia (288 50) (D72 819)   18  Lower extremity edema (782 3) (R60 0)   19  Muscle Cramps (729 82)   20  Neutropenia (288 00) (D70 9)   21  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) (R76 12)   22  Thrombocytopenia (287 5) (D69 6)   23  Ulcer of esophagus without bleeding (530 20) (K22 10)    Current Meds   1  Aspirin 81 MG Oral Tablet Delayed Release; Therapy: 91Yjs9191 to Recorded   2  Benzonatate 200 MG Oral Capsule; TAKE 1 CAPSULE 3 TIMES DAILY AS NEEDED; Therapy: 24CPW9341 to (Evaluate:07Oct2017)  Requested for: 07Sep2017; Last   Rx:07Sep2017 Ordered   3  Citalopram Hydrobromide 40 MG Oral Tablet; Therapy: 21Hgz6934 to Recorded   4  Clotrimazole 10 MG Mouth/Throat Cindy; ALLOW 1 CINDY TO SLOWLY DISSOLVE   IN MOUTH  4 TIMES A DAY;    Therapy: 03DAH3801 to (Evaluate:99Pkf1389)  Requested for: 34PPK2015; Last   Rx:15Mar2017 Ordered   5  Doxycycline Hyclate 100 MG Oral Tablet; TAKE 1 TABLET EVERY 12 HOURS DAILY; Therapy: 32FEI9153 to (Evaluate:54Ylt0741)  Requested for: 21Nub3078; Last   Rx:12Jbn1764 Ordered   6  Fenofibrate 50 MG Oral Capsule; TAKE 1 CAPSULE DAILY WITH A MEAL; Therapy: (Recorded:05Apr2017) to Recorded   7  Folic Acid 1 MG Oral Tablet; Therapy: (Recorded:07Mar2014) to Recorded   8  Gabapentin 100 MG Oral Capsule; TAKE 1 CAPSULE 3 TIMES DAILY; Therapy: 12OLK5285 to (Casi Corona)  Requested for: 12Omx3821; Last   Rx:42Xrf2588 Ordered   9  Gazyva 1000 MG/40ML Intravenous Solution; Therapy: 93Tkq5650 to Recorded   10  GNP Potassium Gluconate 595 (99 K) MG Oral Tablet; Take 1 tablet daily Recorded   11  Imbruvica 140 MG Oral Capsule; Therapy: 87Cmf3658 to Recorded   12  Ondansetron 8 MG Oral Tablet Disintegrating; 1 tab PO Q 8 hr PRN nausea; Therapy: 74VFI7499 to (Last Rx:15Mar2017)  Requested for: 44ZCQ3900 Ordered   13  Pantoprazole Sodium 40 MG Oral Tablet Delayed Release (Protonix); TAKE 1 TABLET    TWICE A DAY; Therapy: 05WIK2383 to (Last Rx:75Bkq2119)  Requested for: 89Hdh4455 Ordered   14  PredniSONE 20 MG Oral Tablet; TAKE 2 TABLETS DAILY WITH FOOD AS DIRECTED; Therapy: 17ECA1284 to (Evaluate:97Qdt9808)  Requested for: 10AGU6250; Last    Rx:71Urz7254 Ordered   15  TraMADol HCl - 50 MG Oral Tablet; TAKE 1 TABLET EVERY 6 HOURS AS NEEDED FOR    ONGOING PAIN;    Therapy: 65MZE2780 to (Last Rx:93Znr0455) Ordered    Allergies    1   Heparin    Signatures   Electronically signed by : Jessi Calderon RN; Sep 19 2017  1:03PM EST                       (Author)

## 2018-01-13 NOTE — MISCELLANEOUS
Message   Recorded as Task   Date: 05/09/2016 12:44 PM, Created By: Lui Davis   Task Name: Call Back   Assigned To: Roxie Fabry   Regarding Patient: Lela Alba, Status: Active   CommentVeria Mantle - 09 May 2016 12:44 PM     TASK CREATED  Caller: Self; Other; [phone] (Mobile Phone)  PT HAD BLOOD DRAWN ON THE 3RD  HE WAS NOTIFIED THAT HE NEEDS BLOOD DRAWN FRI OR SATRUDAY  IS THIS CORRECT TO GET IT DRAWN AGAIN? PLEASE CALL TO ADVISE, TXS   Spoke with pt and does not need to repeat labs tomorrow  Active Problems    1  Abdominal pain (789 00) (R10 9)   2  Bronchitis (490) (J40)   3  Chronic lymphocytic leukemia (204 10) (C91 10)   4  Chronic maxillary sinusitis (473 0) (J32 0)   5  Coronary artery disease (414 00) (I25 10)   6  Dyslipidemia (272 4) (E78 5)   7  Dysphagia (787 20) (R13 10)   8  Esophagitis, reflux (530 11) (K21 0)   9  Heart disease (429 9) (I51 9)   10  Hemolytic anemia (283 9) (D58 9)   11  Leukopenia (288 50) (D72 819)   12  Muscle Cramps (729 82)   13  Neutropenia (288 00) (D70 9)   14  Thrombocytopenia (287 5) (D69 6)    Current Meds   1  Aspirin 325 MG CAPS; Therapy: (Recorded:07Mar2014) to Recorded   2  Carafate 1 GM/10ML Oral Suspension; TAKE 10 ML 4 TIMES DAILY; Therapy: 51IKH6161 to (Merribeth Precise)  Requested for: 35Lqf3460; Last   Rx:71Soa3254 Ordered   3  Carafate 1 GM/10ML Oral Suspension; TAKE 10 ML 4 TIMES DAILY; Therapy: 66Lyn8305 to (Edmond Aranda)  Requested for: 90Eis5585; Last   Rx:40Bpd6302 Ordered   4  Citalopram Hydrobromide 40 MG Oral Tablet; Therapy: (Recorded:02Jan2013) to Recorded   5  Fenofibrate Micronized 67 MG Oral Capsule; Therapy: (Recorded:02Jan2013) to Recorded   6  Folic Acid 1 MG Oral Tablet; Therapy: (Recorded:07Mar2014) to Recorded   7  Gabapentin 100 MG Oral Capsule; TAKE 1 CAPSULE 3 TIMES DAILY; Therapy: 95XUY5842 to (Sondra Kehr)  Requested for: 04Aug2015; Last   Rx:04Aug2015 Ordered   8   Levofloxacin 500 MG Oral Tablet (Levaquin); TAKE 1 TABLET DAILY AS DIRECTED; Therapy: 57CPN8909 to (Evaluate:38Bmi4235)  Requested for: 28Jun2015; Last   MAHENDRA:67SWE7090 Ordered   9  Metoprolol Tartrate 50 MG Oral Tablet; Therapy: 53LSQ8326 to (Evaluate:66Egp9276) Recorded   10  Pantoprazole Sodium 40 MG Oral Tablet Delayed Release (Protonix); TAKE 1 TABLET    TWICE A DAY; Therapy: 51WGQ9974 to (Last Rx:26Klg9556)  Requested for: 12Aug2015 Ordered   11  Plavix 75 MG Oral Tablet (Clopidogrel Bisulfate); Therapy: (Recorded:78Jin5985) to Recorded   12  PredniSONE 5 MG Oral Tablet; 1 by mouth daily or as directed  Take with food     Requested for: 48ZGZ7428; Last Rx:04Jan2016 Ordered   13  Ranexa 500 MG Oral Tablet Extended Release 12 Hour; Therapy: (Recorded:02Jan2013) to Recorded   14  Ranitidine HCl - 300 MG Oral Tablet; TAKE 1 TABLET DAILY AT DINNER; Therapy: 34Cxn6479 to (Jackie Rebecca)  Requested for: 12Aug2015; Last    Rx:27Qfa9321 Ordered   15  Simvastatin 40 MG Oral Tablet; Therapy: (Recorded:18Apr2013) to Recorded   16  Ventolin  (90 Base) MCG/ACT Inhalation Aerosol Solution; INHALE 1 TO 2    PUFFS EVERY 4 TO 6 HOURS AS NEEDED; Therapy: 50TMS8174 to (Norma Pardo)  Requested for: 49LOH2485; Last    Rx:09Oct2013 Ordered   17  Wellbutrin 100 MG Oral Tablet (BuPROPion HCl); Therapy: (Recorded:06Jan2016) to Recorded    Allergies    1  Heparin   2   Macrolides    Signatures   Electronically signed by : Elia Paez RN; May  9 2016 12:55PM EST                       (Author)

## 2018-01-13 NOTE — MISCELLANEOUS
Message   Recorded as Task   Date: 04/19/2016 08:43 AM, Created By: Jefferson Brock   Task Name: Call Back   Assigned To: Lanie Earl   Regarding Patient: Mónica Duke, Status: Active   CommentAlkamalaagnes Wilkins - 19 Apr 2016 8:43 AM     TASK CREATED  Caller: Niki Souza, Other; Other; [phone]  Niki Souza  called from [facility] to inform you something about the pt  She said if you could please call her  Spoke with Alvaro Welch who wanted to make sure Dr Stevens Never had checked pt's last labs b/c PCP said S2mjwssaypkcnpa high  It was checked  Pt runs high  Active Problems    1  Abdominal pain (789 00) (R10 9)   2  Bronchitis (490) (J40)   3  Chronic lymphocytic leukemia (204 10) (C91 10)   4  Chronic maxillary sinusitis (473 0) (J32 0)   5  Coronary artery disease (414 00) (I25 10)   6  Dyslipidemia (272 4) (E78 5)   7  Dysphagia (787 20) (R13 10)   8  Esophagitis, reflux (530 11) (K21 0)   9  Heart disease (429 9) (I51 9)   10  Hemolytic anemia (283 9) (D58 9)   11  Leukopenia (288 50) (D72 819)   12  Muscle Cramps (729 82)   13  Neutropenia (288 00) (D70 9)   14  Thrombocytopenia (287 5) (D69 6)    Current Meds   1  Aspirin 325 MG CAPS; Therapy: (Recorded:07Mar2014) to Recorded   2  Carafate 1 GM/10ML Oral Suspension; TAKE 10 ML 4 TIMES DAILY; Therapy: 06YUY8839 to (Nelma Bilberry)  Requested for: 49Yqm5045; Last   Rx:56Shd1858 Ordered   3  Carafate 1 GM/10ML Oral Suspension; TAKE 10 ML 4 TIMES DAILY; Therapy: 62Kvt8083 to (Clearnce Burner)  Requested for: 75Szb9330; Last   Rx:53Deb9953 Ordered   4  Citalopram Hydrobromide 40 MG Oral Tablet; Therapy: (Recorded:02Jan2013) to Recorded   5  Fenofibrate Micronized 67 MG Oral Capsule; Therapy: (Recorded:02Jan2013) to Recorded   6  Folic Acid 1 MG Oral Tablet; Therapy: (Recorded:07Mar2014) to Recorded   7  Gabapentin 100 MG Oral Capsule; TAKE 1 CAPSULE 3 TIMES DAILY; Therapy: 16GUW0171 to (Fabian Delgado)  Requested for: 82Mym0751;  Last Rx:64Ett5926 Ordered   8  Levofloxacin 500 MG Oral Tablet (Levaquin); TAKE 1 TABLET DAILY AS DIRECTED; Therapy: 29HGF6784 to (Evaluate:74Jii6253)  Requested for: 28Jun2015; Last   TB:11QKL2863 Ordered   9  Metoprolol Tartrate 50 MG Oral Tablet; Therapy: 33MHN2768 to (Evaluate:91Cxw0946) Recorded   10  Pantoprazole Sodium 40 MG Oral Tablet Delayed Release (Protonix); TAKE 1 TABLET    TWICE A DAY; Therapy: 34YSH8048 to (Last Rx:30Qng2335)  Requested for: 12Aug2015 Ordered   11  Plavix 75 MG Oral Tablet (Clopidogrel Bisulfate); Therapy: (Recorded:12Mir1003) to Recorded   12  PredniSONE 5 MG Oral Tablet; 1 by mouth daily or as directed  Take with food     Requested for: 02WSI0785; Last Rx:04Jan2016 Ordered   13  Ranexa 500 MG Oral Tablet Extended Release 12 Hour; Therapy: (Recorded:02Jan2013) to Recorded   14  Ranitidine HCl - 300 MG Oral Tablet; TAKE 1 TABLET DAILY AT DINNER; Therapy: 12Aug2015 to (Teresita Ashley)  Requested for: 12Aug2015; Last    Rx:12Aug2015 Ordered   15  Simvastatin 40 MG Oral Tablet; Therapy: (Recorded:18Apr2013) to Recorded   16  Ventolin  (90 Base) MCG/ACT Inhalation Aerosol Solution; INHALE 1 TO 2    PUFFS EVERY 4 TO 6 HOURS AS NEEDED; Therapy: 95IBW1247 to (Sandra Pisano)  Requested for: 53CEI7085; Last    Rx:09Oct2013 Ordered   17  Wellbutrin 100 MG Oral Tablet (BuPROPion HCl); Therapy: (Recorded:06Jan2016) to Recorded    Allergies    1  Heparin   2   Macrolides    Signatures   Electronically signed by : Carmen Haile RN; Apr 19 2016  9:14AM EST                       (Author)

## 2018-01-13 NOTE — MISCELLANEOUS
Message   Recorded as Task   Date: 05/11/2017 09:58 AM, Created By: CAROLE VAUGHN   Task Name: Call Back   Assigned To: Lyndsay Diaz   Regarding Patient: Dilma Cohen, Status: Active   Comment:    Giorgio Steve - 11 May 2017 9:58 AM     TASK CREATED  Pt called and would like to know if his Venclexta needed to be adjusted  #: [phone]   LUIS MIGUEL Corcoran 98 - 11 May 2017 10:03 AM     TASK REASSIGNED: Previously Assigned To LUIS MIGUEL Corcoran 98   spoke to patient  advised to increase venetoclax to 80 mg PO daily x 1 week  plt 92,000, hgb 10 8, ANC 1 88  Will call next week with dosing instructions        Signatures   Electronically signed by : Nanette Juárez, [facility]; May 11 2017 10:24AM EST                       (Author)

## 2018-01-13 NOTE — MISCELLANEOUS
Message  platelet count on 3/07/01 was 64,000  Platelets to stay above 75,000 on Veneteclex  Patient to hold veneteclax until Monday 6/5/17 and recheck labs on Monday        Signatures   Electronically signed by : Omaira Henderson, [facility]; May 31 2017  9:50AM EST                       (Author)

## 2018-01-13 NOTE — MISCELLANEOUS
Message   Recorded as Task   Date: 01/27/2017 12:16 PM, Created By: Tiarra Huerta   Task Name: Call Back   Assigned To: Sue Thomas   Regarding Patient: Jhony Hernández, Status: Active   Comment:    Stephani Chapman - 27 Jan 2017 12:16 PM     TASK CREATED  Caller: Self; Other; [phone] (Home)  stated that he just got out of the hospital and he stated that they requested that we  prescribe 5mg of prednisone instead of 10mg  Req a call back at [phone]  Spoke with pt who explained reasoning for 5 mg Prednisone daily until meds for ulcer are done, aprrox 2 more weeks  D/W Dr Blum Route  OK given and reminded pt has OV 2/1/17  Getting [facility]CD q Mon/Thurs with [facility]  Active Problems    1  Abdominal pain (789 00) (R10 9)   2  Bronchitis (490) (J40)   3  Chronic lymphocytic leukemia (204 10) (C91 10)   4  Chronic maxillary sinusitis (473 0) (J32 0)   5  Coronary artery disease (414 00) (I25 10)   6  Dyslipidemia (272 4) (E78 5)   7  Dysphagia (787 20) (R13 10)   8  Esophagitis, reflux (530 11) (K21 0)   9  Heart disease (429 9) (I51 9)   10  Hemolytic anemia (283 9) (D58 9)   11  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,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)   12  Leukopenia (288 50) (D72 819)   13  Muscle Cramps (729 82)   14  Neutropenia (288 00) (D70 9)   15  Thrombocytopenia (287 5) (D69 6)    Current Meds   1  Carafate 1 GM/10ML Oral Suspension; TAKE 10 ML 4 TIMES DAILY; Therapy: 98HNH2363 to (Ric Ibarra)  Requested for: 08Sep2015; Last   Rx:08Sep2015 Ordered   2  Clopidogrel Bisulfate 75 MG Oral Tablet; Take 1 tablet daily Recorded   3  Fenofibrate Micronized 67 MG Oral Capsule; Therapy: (Recorded:02Jan2013) to Recorded   4  Folic Acid 1 MG Oral Tablet; Therapy: (Recorded:07Mar2014) to Recorded   5  Gabapentin 100 MG Oral Capsule; TAKE 1 CAPSULE 3 TIMES DAILY; Therapy: 82DNU2207 to (Theo Aguilar)  Requested for: 48Qcr4124; Last   Rx:37Aql8827 Ordered   6   GNP Potassium Gluconate 595 (99 K) MG Oral Tablet; Take 1 tablet daily Recorded   7  Lisinopril 20 MG Oral Tablet; Take 1 tablet daily Recorded   8  Metoprolol Tartrate 50 MG Oral Tablet; Therapy: 30DVU9849 to (Evaluate:99Rta6699) Recorded   9  Pantoprazole Sodium 40 MG Oral Tablet Delayed Release (Protonix); TAKE 1 TABLET   TWICE A DAY; Therapy: 09SCG4470 to (Last Rx:55Ufz8409)  Requested for: 69Pnl7519 Ordered   10  Plavix 75 MG Oral Tablet (Clopidogrel Bisulfate); Therapy: (Recorded:18Hzo6938) to Recorded   11  PredniSONE 10 MG Oral Tablet; one daily with food; Therapy: 87IPH4186 to (Felicitas Harris)  Requested for: 88XOY8633; Last    Rx:30Nov2016 Ordered   12  PredniSONE 5 MG Oral Tablet; 1 by mouth daily or as directed  Take with food     Requested for: 72KQG1341; Last Rx:03War6622 Ordered   13  Ranexa 500 MG Oral Tablet Extended Release 12 Hour; Therapy: (Recorded:02Jan2013) to Recorded   14  Simvastatin 40 MG Oral Tablet; Therapy: (Recorded:18Apr2013) to Recorded   15  ValACYclovir HCl - 1 GM Oral Tablet; Therapy: (Recorded:30Nov2016) to Recorded   16  Ventolin  (90 Base) MCG/ACT Inhalation Aerosol Solution; INHALE 1 TO 2    PUFFS EVERY 4 TO 6 HOURS AS NEEDED; Therapy: 81PWJ4824 to (Abigail Gonzalez)  Requested for: 89QHN4470; Last    Rx:49Kus4986 Ordered    Allergies    1  Heparin   2   Macrolides    Signatures   Electronically signed by : Karl Gonzales RN; Jan 27 2017  1:07PM EST                       (Author)

## 2018-01-14 VITALS
HEART RATE: 81 BPM | SYSTOLIC BLOOD PRESSURE: 152 MMHG | RESPIRATION RATE: 18 BRPM | TEMPERATURE: 98.4 F | DIASTOLIC BLOOD PRESSURE: 72 MMHG | HEIGHT: 72 IN | WEIGHT: 215.25 LBS | BODY MASS INDEX: 29.16 KG/M2 | OXYGEN SATURATION: 99 %

## 2018-01-14 VITALS
RESPIRATION RATE: 16 BRPM | HEART RATE: 98 BPM | BODY MASS INDEX: 26.6 KG/M2 | DIASTOLIC BLOOD PRESSURE: 62 MMHG | TEMPERATURE: 96.5 F | OXYGEN SATURATION: 100 % | HEIGHT: 72 IN | SYSTOLIC BLOOD PRESSURE: 112 MMHG | WEIGHT: 196.38 LBS

## 2018-01-14 VITALS
BODY MASS INDEX: 28.33 KG/M2 | SYSTOLIC BLOOD PRESSURE: 138 MMHG | DIASTOLIC BLOOD PRESSURE: 70 MMHG | OXYGEN SATURATION: 99 % | TEMPERATURE: 97.5 F | RESPIRATION RATE: 18 BRPM | HEIGHT: 72 IN | WEIGHT: 209.2 LBS | HEART RATE: 81 BPM

## 2018-01-14 NOTE — MISCELLANEOUS
Message   Recorded as Task   Date: 04/18/2017 10:26 AM, Created By: Jb Hughes   Task Name: Call Back   Assigned To: Tabby Echols   Regarding Patient: Kareen Tapia, Status: Active   Comment:    Austin Caban - 18 Apr 2017 10:26 AM     TASK CREATED  Patient states he needs a refill on chemothearpy medication he states he has 7 days supply left    Sue Thomas - 18 Apr 2017 10:55 AM     TASK REASSIGNED: Previously Assigned To Yessy Peraza to patient and did tell him it was ordered and it would be coming to his home  Active Problems    1  Abdominal pain (789 00) (R10 9)   2  Bronchitis (490) (J40)   3  Chemotherapy-induced nausea (787 02,E933 1) (R11 0,T45  1X5A)   4  Chronic lymphocytic leukemia (204 10) (C91 10)   5  Chronic maxillary sinusitis (473 0) (J32 0)   6  Coronary artery disease (414 00) (I25 10)   7  Dyslipidemia (272 4) (E78 5)   8  Dysphagia (787 20) (R13 10)   9  Esophagitis, reflux (530 11) (K21 0)   10  Heart disease (429 9) (I51 9)   11  Hemolytic anemia (283 9) (D58 9)   12  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,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)   13  Leukopenia (288 50) (D72 819)   14  Lower extremity edema (782 3) (R60 0)   15  Muscle Cramps (729 82)   16  Neutropenia (288 00) (D70 9)   17  Thrombocytopenia (287 5) (D69 6)    Current Meds   1  Acyclovir 400 MG Oral Tablet; 1 tab po bid; Therapy: 99IOW0298 to (Last Rx:15Mar2017)  Requested for: 87MRR5939 Ordered   2  Acyclovir TABS; 600mg twice a day; Therapy: (Recorded:75Cnn7692) to Recorded   3  Allopurinol 100 MG Oral Tablet; 2 daily  Stop if rash; Therapy: 87HIG5511 to (Last Rx:96Ufg0219)  Requested for: 40Iia3757 Ordered   4  Carafate 1 GM/10ML Oral Suspension; TAKE 10 ML 4 TIMES DAILY; Therapy: 56HPZ3285 to (95 908081)  Requested for: 08Sep2015; Last   Rx:53Hmc6324 Ordered   5  Clopidogrel Bisulfate 75 MG Oral Tablet; Take 1 tablet daily Recorded   6   Clotrimazole 10 MG Mouth/Throat Constantine; ALLOW 1 CINDY TO SLOWLY DISSOLVE   IN MOUTH  4 TIMES A DAY; Therapy: 37JUO3998 to (Evaluate:14May2017)  Requested for: 92XES2291; Last   Rx:15Mar2017 Ordered   7  Fenofibrate 50 MG Oral Capsule; TAKE 1 CAPSULE DAILY WITH A MEAL; Therapy: (Recorded:05Apr2017) to Recorded   8  Folic Acid 1 MG Oral Tablet; Therapy: (Recorded:07Mar2014) to Recorded   9  Gabapentin 100 MG Oral Capsule; TAKE 1 CAPSULE 3 TIMES DAILY; Therapy: 21VDQ4214 to (Akosua Mckeon)  Requested for: 28Yel8339; Last   Rx:04Aug2015 Ordered   10  GNP Potassium Gluconate 595 (99 K) MG Oral Tablet; Take 1 tablet daily Recorded   11  LevoFLOXacin 750 MG Oral Tablet; TAKE AS DIRECTED; Therapy: (Recorded:05Apr2017) to Recorded   12  Lexapro 10 MG Oral Tablet (Escitalopram Oxalate); TAKE 1 TABLET DAILY; Therapy: (Recorded:05Apr2017) to Recorded   13  Lisinopril 20 MG Oral Tablet; Take 1 tablet daily Recorded   14  Metoprolol Tartrate 50 MG Oral Tablet; Therapy: 80RND2635 to (Evaluate:06Jul2014) Recorded   15  Ondansetron 8 MG Oral Tablet Dispersible; 1 tab PO Q 8 hr PRN nausea; Therapy: 61KHY9230 to (Last Rx:15Mar2017)  Requested for: 10HKE0786 Ordered   16  Pantoprazole Sodium 40 MG Oral Tablet Delayed Release (Protonix); TAKE 1 TABLET    TWICE A DAY; Therapy: 11ICD7808 to (Last Rx:12Aug2015)  Requested for: 09Huj8633 Ordered   17  Plavix 75 MG Oral Tablet (Clopidogrel Bisulfate); Therapy: (Recorded:18May2015) to Recorded   18  PredniSONE 10 MG Oral Tablet; one daily with food; Therapy: 29BNJ2053 to (Shayan Castro)  Requested for: 61WST6725; Last    Rx:30Nov2016 Ordered   19  PredniSONE 5 MG Oral Tablet; 1 by mouth daily or as directed  Take with food     Requested for: 16FCR5209; Last Rx:74Dzq2630 Ordered   20  Ranexa 500 MG Oral Tablet Extended Release 12 Hour; Therapy: (Recorded:02Jan2013) to Recorded   21  ValACYclovir HCl - 1 GM Oral Tablet; Therapy: (Recorded:30Nov2016) to Recorded   22   Venclexta 10 MG Oral Tablet; 2 TABS PO DAILY X 1 WEEK THEN INCREASE TO 4 TABS    PO DAILY; Therapy: 31Xgk3722 to (Last Rx:39Xbv0260)  Requested for: 29Duu2110 Ordered   23  Ventolin  (90 Base) MCG/ACT Inhalation Aerosol Solution; INHALE 1 TO 2    PUFFS EVERY 4 TO 6 HOURS AS NEEDED; Therapy: 60MUL2550 to (Miguel Lopez)  Requested for: 46SAW7260; Last    Rx:22Eda9291 Ordered    Allergies    1  Heparin   2  Macrolides    Signatures   Electronically signed by :  Omaira Arenas RN; Apr 18 2017 10:59AM EST                       (Author)

## 2018-01-14 NOTE — MISCELLANEOUS
Message   Recorded as Task   Date: 09/20/2017 01:03 PM, Created By: Yohana Best   Task Name: Call Back   Assigned To: Sue Thomas   Regarding Patient: Norma RAYMOND, Status: Active   Comment:    Nicole Fulton - 20 Sep 2017 1:03 PM     TASK CREATED  Pt called is being discharged from [place] [address] today  Was to have chemo today  Wants to know what the plan is  Call back on cell phone  Spoke with Dr Srinivasa Davies  Spoke with pt and will come to office today post discharge  Chemo rescheduled to next week  Active Problems    1  Abdominal pain (789 00) (R10 9)   2  Anemia (285 9) (D64 9)   3  Cellulitis of hand without finger or thumb, right (682 4) (L03 113)   4  Chemotherapy-induced nausea (787 02,E933 1) (R11 0,T45  1X5A)   5  Chronic kidney disease, unspecified CKD stage (585 9) (N18 9)   6  Chronic lymphocytic leukemia (204 10) (C91 10)   7  Chronic maxillary sinusitis (473 0) (J32 0)   8  Coronary artery disease (414 00) (I25 10)   9  Dyslipidemia (272 4) (E78 5)   10  Dysphagia (787 20) (R13 10)   11  Esophagitis, reflux (530 11) (K21 0)   12  Heart disease (429 9) (I51 9)   13  Hemolytic anemia (283 9) (D58 9)   14  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,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)   15  History of hemolytic anemia (V12 3) (Z86 2)   16  Joint pain (719 40) (M25 50)   17  Leukopenia (288 50) (D72 819)   18  Lower extremity edema (782 3) (R60 0)   19  Muscle Cramps (729 82)   20  Neutropenia (288 00) (D70 9)   21  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) (R76 12)   22  Thrombocytopenia (287 5) (D69 6)   23  Ulcer of esophagus without bleeding (530 20) (K22 10)    Current Meds   1  Aspirin 81 MG Oral Tablet Delayed Release; Therapy: 30Omg5372 to Recorded   2  Benzonatate 200 MG Oral Capsule; TAKE 1 CAPSULE 3 TIMES DAILY AS NEEDED; Therapy: 17PTH9205 to (Evaluate:07Oct2017)  Requested for: 07Sep2017; Last   Rx:07Sep2017 Ordered   3  Citalopram Hydrobromide 40 MG Oral Tablet; Therapy: 11Sep2017 to Recorded   4   Clotrimazole 10 MG Mouth/Throat Cindy; ALLOW 1 CINDY TO SLOWLY DISSOLVE   IN MOUTH  4 TIMES A DAY; Therapy: 39FHX1623 to (Evaluate:28Fnd5088)  Requested for: 77MVM3528; Last   Rx:15Mar2017 Ordered   5  Doxycycline Hyclate 100 MG Oral Tablet; TAKE 1 TABLET EVERY 12 HOURS DAILY; Therapy: 73QCK2476 to (Evaluate:11Ury8752)  Requested for: 46Iql7349; Last   Rx:08Vvt0327 Ordered   6  Fenofibrate 50 MG Oral Capsule; TAKE 1 CAPSULE DAILY WITH A MEAL; Therapy: (Recorded:05Apr2017) to Recorded   7  Folic Acid 1 MG Oral Tablet; Therapy: (Recorded:07Mar2014) to Recorded   8  Gabapentin 100 MG Oral Capsule; TAKE 1 CAPSULE 3 TIMES DAILY; Therapy: 67JWU5876 to (Arnav Hawthorne)  Requested for: 64Ush2027; Last   Rx:43Dxy3221 Ordered   9  Gazyva 1000 MG/40ML Intravenous Solution; Therapy: 02Ego8824 to Recorded   10  GNP Potassium Gluconate 595 (99 K) MG Oral Tablet; Take 1 tablet daily Recorded   11  Imbruvica 140 MG Oral Capsule; Therapy: 11Ewh0737 to Recorded   12  Ondansetron 8 MG Oral Tablet Disintegrating; 1 tab PO Q 8 hr PRN nausea; Therapy: 71FCN7024 to (Last Rx:15Mar2017)  Requested for: 34MCE7596 Ordered   13  Pantoprazole Sodium 40 MG Oral Tablet Delayed Release (Protonix); TAKE 1 TABLET    TWICE A DAY; Therapy: 56JEI8179 to (Last Rx:65Lgp4894)  Requested for: 91Ukh0815 Ordered   14  PredniSONE 20 MG Oral Tablet; TAKE 2 TABLETS DAILY WITH FOOD AS DIRECTED; Therapy: 02EXO4383 to (Evaluate:86Nnq2353)  Requested for: 19SAD7452; Last    Rx:21Hab6030 Ordered   15  TraMADol HCl - 50 MG Oral Tablet; TAKE 1 TABLET EVERY 6 HOURS AS NEEDED FOR    ONGOING PAIN;    Therapy: 44SMH8956 to (Last Rx:46Tzh0881) Ordered    Allergies    1   Heparin    Signatures   Electronically signed by : Heath Roque RN; Sep 20 2017  2:37PM EST                       (Author)

## 2018-01-14 NOTE — MISCELLANEOUS
Message   Recorded as Task   Date: 03/08/2017 09:30 AM, Created By: Belinda Price   Task Name: Call Back   Assigned To: Sue Thomas   Regarding Patient: Dawn Sy, Status: Active   Comment:    Nicole Fulton - 08 Mar 2017 9:30 AM     TASK CREATED  Patient called just got out of [address]  Said he had 2u yesterday  Is asking what Dr Briesyda Carney wants him to do next  Has an appointment scheduled for 3/15  Use cell phone  [phone]   Dr Briseyda Carney spoke with pt  Active Problems    1  Abdominal pain (789 00) (R10 9)   2  Bronchitis (490) (J40)   3  Chronic lymphocytic leukemia (204 10) (C91 10)   4  Chronic maxillary sinusitis (473 0) (J32 0)   5  Coronary artery disease (414 00) (I25 10)   6  Dyslipidemia (272 4) (E78 5)   7  Dysphagia (787 20) (R13 10)   8  Esophagitis, reflux (530 11) (K21 0)   9  Heart disease (429 9) (I51 9)   10  Hemolytic anemia (283 9) (D58 9)   11  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,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)   12  Leukopenia (288 50) (D72 819)   13  Muscle Cramps (729 82)   14  Neutropenia (288 00) (D70 9)   15  Thrombocytopenia (287 5) (D69 6)    Current Meds   1  Acyclovir TABS; 600mg twice a day; Therapy: (Recorded:01Feb2017) to Recorded   2  Allopurinol 100 MG Oral Tablet; 2 daily  Stop if rash; Therapy: 88GGK5505 to (Last Rx:57Qvj1481)  Requested for: 22Feb2017 Ordered   3  Carafate 1 GM/10ML Oral Suspension; TAKE 10 ML 4 TIMES DAILY; Therapy: 79MHC9811 to [phone]  Requested for: 08Sep2015; Last   Rx:08Sep2015 Ordered   4  Clopidogrel Bisulfate 75 MG Oral Tablet; Take 1 tablet daily Recorded   5  Fenofibrate Micronized 67 MG Oral Capsule; Therapy: (Recorded:02Jan2013) to Recorded   6  Folic Acid 1 MG Oral Tablet; Therapy: (Recorded:07Mar2014) to Recorded   7  Gabapentin 100 MG Oral Capsule; TAKE 1 CAPSULE 3 TIMES DAILY; Therapy: 12VGD7540 to (Cathleen Henao)  Requested for: 33Myo2500; Last   Rx:57Ozc4491 Ordered   8   GNP Potassium Gluconate 595 (99 K) MG Oral Tablet; Take 1 tablet daily Recorded   9  Lisinopril 20 MG Oral Tablet; Take 1 tablet daily Recorded   10  Metoprolol Tartrate 50 MG Oral Tablet; Therapy: 82GIX6135 to (Evaluate:18Kvg6759) Recorded   11  Pantoprazole Sodium 40 MG Oral Tablet Delayed Release (Protonix); TAKE 1 TABLET    TWICE A DAY; Therapy: 63IEN0610 to (Last Rx:11Xyx7893)  Requested for: 94Fvn5748 Ordered   12  Plavix 75 MG Oral Tablet (Clopidogrel Bisulfate); Therapy: (Recorded:88Hzh4503) to Recorded   13  PredniSONE 10 MG Oral Tablet; one daily with food; Therapy: 84VGT7853 to (Reynaldo Valentino)  Requested for: 58PGX1755; Last    Rx:30Nov2016 Ordered   14  PredniSONE 5 MG Oral Tablet; 1 by mouth daily or as directed  Take with food     Requested for: 38WXH4295; Last Rx:05Aak7330 Ordered   15  Ranexa 500 MG Oral Tablet Extended Release 12 Hour; Therapy: (Recorded:02Jan2013) to Recorded   16  Simvastatin 40 MG Oral Tablet; Therapy: (Recorded:49Vrk1773) to Recorded   17  ValACYclovir HCl - 1 GM Oral Tablet; Therapy: (Recorded:30Nov2016) to Recorded   18  Ventolin  (90 Base) MCG/ACT Inhalation Aerosol Solution; INHALE 1 TO 2    PUFFS EVERY 4 TO 6 HOURS AS NEEDED; Therapy: 06ZOB3896 to (Chad Duglas)  Requested for: 40AQR2649; Last    Rx:40Zfh6035 Ordered    Allergies    1  Heparin   2   Macrolides    Signatures   Electronically signed by : Reema Pearce RN; Mar  8 2017 12:34PM EST                       (Author)

## 2018-01-14 NOTE — MISCELLANEOUS
Message  Call from Zoe Jade RN at [facility] to report pt had slight reaction to Rituxan with itching and increasing temp  Hypersensitivity protocol initiated and sx improved except temp which is 100 9  Pt received over 400 ml of med  Instructed hold remainder of Rituxan today  Active Problems    1  Abdominal pain (789 00) (R10 9)   2  Bronchitis (490) (J40)   3  Chronic lymphocytic leukemia (204 10) (C91 10)   4  Chronic maxillary sinusitis (473 0) (J32 0)   5  Coronary artery disease (414 00) (I25 10)   6  Dyslipidemia (272 4) (E78 5)   7  Dysphagia (787 20) (R13 10)   8  Esophagitis, reflux (530 11) (K21 0)   9  Heart disease (429 9) (I51 9)   10  Hemolytic anemia (283 9) (D58 9)   11  Leukopenia (288 50) (D72 819)   12  Muscle Cramps (729 82)   13  Neutropenia (288 00) (D70 9)   14  Thrombocytopenia (287 5) (D69 6)    Current Meds   1  Aspirin 325 MG CAPS; Therapy: (Recorded:07Mar2014) to Recorded   2  Carafate 1 GM/10ML Oral Suspension; TAKE 10 ML 4 TIMES DAILY; Therapy: 15KDH6043 to (Carlos Manuel Agarwal)  Requested for: 08Sep2015; Last   Rx:08Sep2015 Ordered   3  Carafate 1 GM/10ML Oral Suspension; TAKE 10 ML 4 TIMES DAILY; Therapy: 73Dwb6074 to (Camryn Chow)  Requested for: 97Tfa9068; Last   Rx:94Eji3521 Ordered   4  Citalopram Hydrobromide 40 MG Oral Tablet; Therapy: (Recorded:02Jan2013) to Recorded   5  Fenofibrate Micronized 67 MG Oral Capsule; Therapy: (Recorded:02Jan2013) to Recorded   6  Folic Acid 1 MG Oral Tablet; Therapy: (Recorded:07Mar2014) to Recorded   7  Gabapentin 100 MG Oral Capsule; TAKE 1 CAPSULE 3 TIMES DAILY; Therapy: 98HXF2319 to (Sondra Kehr)  Requested for: 10Set2683; Last   Rx:55Dwj7744 Ordered   8  Levofloxacin 500 MG Oral Tablet (Levaquin); TAKE 1 TABLET DAILY AS DIRECTED; Therapy: 09BRX4063 to (Evaluate:93Mlw1622)  Requested for: 28Jun2015; Last   HK:03PEM8291 Ordered   9  Metoprolol Tartrate 50 MG Oral Tablet;    Therapy: 13UPM8692 to (Evaluate:61Bkt6909) Recorded   10  Pantoprazole Sodium 40 MG Oral Tablet Delayed Release (Protonix); TAKE 1 TABLET    TWICE A DAY; Therapy: 77PSG1035 to (Last Rx:63Ifz4489)  Requested for: 12Aug2015 Ordered   11  Plavix 75 MG Oral Tablet (Clopidogrel Bisulfate); Therapy: (Recorded:33Hya9892) to Recorded   12  PredniSONE 5 MG Oral Tablet; 1 by mouth daily or as directed  Take with food     Requested for: 65VVH9385; Last Rx:04Jan2016 Ordered   13  Ranexa 500 MG Oral Tablet Extended Release 12 Hour; Therapy: (Recorded:02Jan2013) to Recorded   14  Ranitidine HCl - 300 MG Oral Tablet; TAKE 1 TABLET DAILY AT DINNER; Therapy: 52Htr4979 to (Rahel Glover)  Requested for: 12Aug2015; Last    Rx:12Aug2015 Ordered   15  Simvastatin 40 MG Oral Tablet; Therapy: (Recorded:18Apr2013) to Recorded   16  Ventolin  (90 Base) MCG/ACT Inhalation Aerosol Solution; INHALE 1 TO 2    PUFFS EVERY 4 TO 6 HOURS AS NEEDED; Therapy: 15LPL5473 to (Lynn Prieto)  Requested for: 61DLT8466; Last    Rx:09Oct2013 Ordered   17  Wellbutrin 100 MG Oral Tablet (BuPROPion HCl); Therapy: (Recorded:06Jan2016) to Recorded    Allergies    1  Heparin   2   Macrolides    Signatures   Electronically signed by : Anali Zavala RN; May  3 2016  2:48PM EST                       (Author)

## 2018-01-15 ENCOUNTER — HOSPITAL ENCOUNTER (OUTPATIENT)
Dept: INFUSION CENTER | Facility: CLINIC | Age: 63
Discharge: HOME | End: 2018-01-15
Payer: MEDICARE

## 2018-01-15 VITALS
BODY MASS INDEX: 27.09 KG/M2 | OXYGEN SATURATION: 99 % | DIASTOLIC BLOOD PRESSURE: 82 MMHG | HEIGHT: 73 IN | WEIGHT: 204.38 LBS | HEART RATE: 78 BPM | TEMPERATURE: 97.1 F | SYSTOLIC BLOOD PRESSURE: 130 MMHG | RESPIRATION RATE: 16 BRPM

## 2018-01-15 LAB
ALBUMIN SERPL BCP-MCNC: 4 G/DL (ref 3.2–5)
ALP SERPL-CCNC: 47 U/L (ref 46–116)
ALT SERPL W P-5'-P-CCNC: 24 U/L (ref 12–78)
ANION GAP SERPL CALCULATED.3IONS-SCNC: 8 MMOL/L (ref 4–13)
AST SERPL W P-5'-P-CCNC: 29 U/L (ref 5–45)
BASOPHILS # BLD AUTO: 0 THOUSAND/UL (ref 0–0.1)
BASOPHILS NFR MAR MANUAL: 0 % (ref 0–1)
BILIRUB DIRECT SERPL-MCNC: 0.13 MG/DL (ref 0–0.2)
BILIRUB SERPL-MCNC: 0.7 MG/DL (ref 0.2–1)
BUN SERPL-MCNC: 17 MG/DL (ref 5–25)
CALCIUM SERPL-MCNC: 9 MG/DL (ref 8.3–10.1)
CHLORIDE SERPL-SCNC: 105 MMOL/L (ref 98–108)
CO2 SERPL-SCNC: 29 MMOL/L (ref 21–32)
CREAT SERPL-MCNC: 1.3 MG/DL (ref 0.6–1.3)
EOSINOPHIL # BLD AUTO: 0.15 THOUSAND/UL (ref 0–0.61)
EOSINOPHIL NFR BLD MANUAL: 5 % (ref 0–6)
ERYTHROCYTE [DISTWIDTH] IN BLOOD BY AUTOMATED COUNT: 14.8 % (ref 11.6–15.1)
GFR SERPL CREATININE-BSD FRML MDRD: 58 ML/MIN/1.73SQ M
GLUCOSE SERPL-MCNC: 93 MG/DL (ref 65–140)
HCT VFR BLD AUTO: 43.6 % (ref 36.5–49.3)
HGB BLD-MCNC: 14.3 G/DL (ref 12–17)
IGG SERPL-MCNC: 103 MG/DL (ref 700–1600)
LDH SERPL-CCNC: 229 U/L (ref 81–234)
LG PLATELETS BLD QL SMEAR: PRESENT
LYMPHOCYTES # BLD AUTO: 0.75 THOUSAND/UL (ref 0.6–4.47)
LYMPHOCYTES # BLD AUTO: 26 % (ref 14–44)
MCH RBC QN AUTO: 28 PG (ref 26.8–34.3)
MCHC RBC AUTO-ENTMCNC: 32.7 G/DL (ref 31.4–37.4)
MCV RBC AUTO: 85 FL (ref 82–98)
MONOCYTES # BLD AUTO: 0.12 THOUSAND/UL (ref 0–1.22)
MONOCYTES NFR BLD AUTO: 4 % (ref 4–12)
NEUTS BAND NFR BLD MANUAL: 2 % (ref 0–8)
NEUTS SEG # BLD: 1.89 THOUSAND/UL (ref 1.81–6.82)
NEUTS SEG NFR BLD AUTO: 63 % (ref 43–75)
PLATELET # BLD AUTO: 64 THOUSANDS/UL (ref 149–390)
PLATELET BLD QL SMEAR: ABNORMAL
PMV BLD AUTO: 9.2 FL (ref 8.9–12.7)
POTASSIUM SERPL-SCNC: 3.9 MMOL/L (ref 3.5–5.3)
PROT SERPL-MCNC: 5.9 G/DL (ref 6.4–8.2)
RBC # BLD AUTO: 5.1 MILLION/UL (ref 3.88–5.62)
RBC MORPH BLD: NORMAL
RETICS # AUTO: NORMAL 10*3/UL (ref 14356–105094)
RETICS # CALC: 1.64 % (ref 0.37–1.87)
SODIUM SERPL-SCNC: 142 MMOL/L (ref 136–145)
TOTAL CELLS COUNTED SPEC: 100
URATE SERPL-MCNC: 5 MG/DL (ref 4.2–8)
WBC # BLD AUTO: 2.9 THOUSAND/UL (ref 4.31–10.16)
WBC NRBC COR # BLD: 2.9 THOUSAND/UL (ref 4.31–10.16)

## 2018-01-15 PROCEDURE — 80053 COMPREHEN METABOLIC PANEL: CPT | Performed by: PHYSICIAN ASSISTANT

## 2018-01-15 PROCEDURE — 84550 ASSAY OF BLOOD/URIC ACID: CPT | Performed by: PHYSICIAN ASSISTANT

## 2018-01-15 PROCEDURE — 82248 BILIRUBIN DIRECT: CPT | Performed by: PHYSICIAN ASSISTANT

## 2018-01-15 PROCEDURE — 83615 LACTATE (LD) (LDH) ENZYME: CPT | Performed by: PHYSICIAN ASSISTANT

## 2018-01-15 PROCEDURE — 82784 ASSAY IGA/IGD/IGG/IGM EACH: CPT | Performed by: PHYSICIAN ASSISTANT

## 2018-01-15 PROCEDURE — 85027 COMPLETE CBC AUTOMATED: CPT | Performed by: PHYSICIAN ASSISTANT

## 2018-01-15 PROCEDURE — 85007 BL SMEAR W/DIFF WBC COUNT: CPT | Performed by: PHYSICIAN ASSISTANT

## 2018-01-15 PROCEDURE — 85045 AUTOMATED RETICULOCYTE COUNT: CPT | Performed by: PHYSICIAN ASSISTANT

## 2018-01-15 PROCEDURE — 82232 ASSAY OF BETA-2 PROTEIN: CPT | Performed by: PHYSICIAN ASSISTANT

## 2018-01-15 RX ORDER — ACETAMINOPHEN 325 MG/1
650 TABLET ORAL ONCE
Status: COMPLETED | OUTPATIENT
Start: 2018-01-16 | End: 2018-01-16

## 2018-01-15 RX ORDER — SODIUM CHLORIDE 9 MG/ML
20 INJECTION, SOLUTION INTRAVENOUS CONTINUOUS
Status: DISCONTINUED | OUTPATIENT
Start: 2018-01-16 | End: 2018-01-19 | Stop reason: HOSPADM

## 2018-01-15 NOTE — MISCELLANEOUS
Message  Call from Kimberly Guzman RN at [facility] to report pt's hgb 6 2   Pt weak, having difficulty walking, productive cough and confused about meds  D/W Dr Carri Steele  Send pt to ER  Dr Carri Steele will call pt's PCP  Active Problems    1  Abdominal pain (789 00) (R10 9)   2  Bronchitis (490) (J40)   3  Chemotherapy-induced nausea (787 02,E933 1) (R11 0,T45  1X5A)   4  Chronic lymphocytic leukemia (204 10) (C91 10)   5  Chronic maxillary sinusitis (473 0) (J32 0)   6  Coronary artery disease (414 00) (I25 10)   7  Dyslipidemia (272 4) (E78 5)   8  Dysphagia (787 20) (R13 10)   9  Esophagitis, reflux (530 11) (K21 0)   10  Heart disease (429 9) (I51 9)   11  Hemolytic anemia (283 9) (D58 9)   12  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,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)   13  Leukopenia (288 50) (D72 819)   14  Muscle Cramps (729 82)   15  Neutropenia (288 00) (D70 9)   16  Thrombocytopenia (287 5) (D69 6)    Current Meds   1  Acyclovir 400 MG Oral Tablet; 1 tab po bid; Therapy: 38DWJ6388 to (Last Rx:15Mar2017)  Requested for: 22RHH8791 Ordered   2  Acyclovir TABS; 600mg twice a day; Therapy: (Recorded:37Fnx1748) to Recorded   3  Allopurinol 100 MG Oral Tablet; 2 daily  Stop if rash; Therapy: 43QYM5193 to (Last Rx:01Xbq0272)  Requested for: 70Dcc8635 Ordered   4  Carafate 1 GM/10ML Oral Suspension; TAKE 10 ML 4 TIMES DAILY; Therapy: 58RUU9043 to (Gurvinder Ayon)  Requested for: 85Rjb3018; Last   Rx:54Ngf4769 Ordered   5  Clopidogrel Bisulfate 75 MG Oral Tablet; Take 1 tablet daily Recorded   6  Clotrimazole 10 MG Mouth/Throat Constantine; ALLOW 1 CONSTANTINE TO SLOWLY DISSOLVE   IN MOUTH  4 TIMES A DAY; Therapy: 19WJV2864 to (Evaluate:16Dby3998)  Requested for: 00BEN8064; Last   Rx:15Mar2017 Ordered   7  Fenofibrate Micronized 67 MG Oral Capsule; Therapy: (Recorded:02Jan2013) to Recorded   8  Folic Acid 1 MG Oral Tablet; Therapy: (Recorded:07Mar2014) to Recorded   9   Gabapentin 100 MG Oral Capsule; TAKE 1 CAPSULE 3 TIMES DAILY; Therapy: 08LQI7922 to (Jessica Figueroa)  Requested for: 42Dxf4769; Last   Rx:83Hya2591 Ordered   10  GNP Potassium Gluconate 595 (99 K) MG Oral Tablet; Take 1 tablet daily Recorded   11  Lisinopril 20 MG Oral Tablet; Take 1 tablet daily Recorded   12  Metoprolol Tartrate 50 MG Oral Tablet; Therapy: 03PMK4519 to (Evaluate:83Fwy2455) Recorded   13  Ondansetron 8 MG Oral Tablet Dispersible; 1 tab PO Q 8 hr PRN nausea; Therapy: 61ZOL4463 to (Last Rx:00Mia4796)  Requested for: 77BMF3216 Ordered   14  Pantoprazole Sodium 40 MG Oral Tablet Delayed Release (Protonix); TAKE 1 TABLET    TWICE A DAY; Therapy: 93JFT9520 to (Last Rx:51Mao5452)  Requested for: 85Wyv4977 Ordered   15  Plavix 75 MG Oral Tablet (Clopidogrel Bisulfate); Therapy: (Recorded:69Shl0429) to Recorded   16  PredniSONE 10 MG Oral Tablet; one daily with food; Therapy: 55ECY9916 to (Jean Steinberg)  Requested for: 94TRK4423; Last    Rx:30Nov2016 Ordered   17  PredniSONE 5 MG Oral Tablet; 1 by mouth daily or as directed  Take with food     Requested for: 18WSA6585; Last Rx:10Jiy7563 Ordered   18  Ranexa 500 MG Oral Tablet Extended Release 12 Hour; Therapy: (Recorded:02Jan2013) to Recorded   19  Simvastatin 40 MG Oral Tablet; Therapy: (Recorded:18Apr2013) to Recorded   20  ValACYclovir HCl - 1 GM Oral Tablet; Therapy: (Recorded:30Nov2016) to Recorded   21  Ventolin  (90 Base) MCG/ACT Inhalation Aerosol Solution; INHALE 1 TO 2    PUFFS EVERY 4 TO 6 HOURS AS NEEDED; Therapy: 92KGC9882 to (Hi Tobin)  Requested for: 60UGZ6286; Last    Rx:89Vwk8086 Ordered    Allergies    1  Heparin   2   Macrolides    Signatures   Electronically signed by : Ashlie Holliday RN; Mar 20 2017  9:43AM EST                       (Author)

## 2018-01-15 NOTE — MISCELLANEOUS
Message  Pt called to FU on yesterday's events  Went to ER and stated they were "useless"  Labs were drawn and he was sent home  Asking if he should go back today to finish Rituxan  Informed NO  Has OV 11/2/16 and will discuss events and make a plan  Pt OK with that  Active Problems    1  Abdominal pain (789 00) (R10 9)   2  Bronchitis (490) (J40)   3  Chronic lymphocytic leukemia (204 10) (C91 10)   4  Chronic maxillary sinusitis (473 0) (J32 0)   5  Coronary artery disease (414 00) (I25 10)   6  Dyslipidemia (272 4) (E78 5)   7  Dysphagia (787 20) (R13 10)   8  Esophagitis, reflux (530 11) (K21 0)   9  Heart disease (429 9) (I51 9)   10  Hemolytic anemia (283 9) (D58 9)   11  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,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)   12  Leukopenia (288 50) (D72 819)   13  Muscle Cramps (729 82)   14  Neutropenia (288 00) (D70 9)   15  Thrombocytopenia (287 5) (D69 6)    Current Meds   1  Carafate 1 GM/10ML Oral Suspension; TAKE 10 ML 4 TIMES DAILY; Therapy: 47DKH4313 to [phone]  Requested for: 11Nli3240; Last   Rx:07Wyk5385 Ordered   2  Clopidogrel Bisulfate 75 MG Oral Tablet; Take 1 tablet daily Recorded   3  Fenofibrate Micronized 67 MG Oral Capsule; Therapy: (Recorded:02Jan2013) to Recorded   4  Folic Acid 1 MG Oral Tablet; Therapy: (Recorded:07Mar2014) to Recorded   5  Gabapentin 100 MG Oral Capsule; TAKE 1 CAPSULE 3 TIMES DAILY; Therapy: 88ZSI0371 to (Caroline Freeze)  Requested for: 08Swt6308; Last   Rx:68Zvv8050 Ordered   6  GNP Potassium Gluconate 595 MG Oral Tablet; Take 1 tablet daily Recorded   7  Lisinopril 20 MG Oral Tablet; Take 1 tablet daily Recorded   8  Metoprolol Tartrate 50 MG Oral Tablet; Therapy: 06ZES5855 to (Evaluate:97Mxj4924) Recorded   9  Pantoprazole Sodium 40 MG Oral Tablet Delayed Release (Protonix); TAKE 1 TABLET   TWICE A DAY; Therapy: 42EAS7996 to (Last Rx:20Yhg5704)  Requested for: 04Eid5124 Ordered   10   Plavix 75 MG Oral Tablet (Clopidogrel Bisulfate); Therapy: (Recorded:66Oys9873) to Recorded   11  PredniSONE 5 MG Oral Tablet; 1 by mouth daily or as directed  Take with food     Requested for: 48YOM5537; Last Rx:61Qsk6496 Ordered   12  Ranexa 500 MG Oral Tablet Extended Release 12 Hour; Therapy: (Recorded:02Jan2013) to Recorded   13  Simvastatin 40 MG Oral Tablet; Therapy: (Recorded:18Apr2013) to Recorded   14  Ventolin  (90 Base) MCG/ACT Inhalation Aerosol Solution; INHALE 1 TO 2    PUFFS EVERY 4 TO 6 HOURS AS NEEDED; Therapy: 89BZA7044 to (Canvas Escalante)  Requested for: 16DRS8558; Last    Rx:59Zhi3895 Ordered    Allergies    1  Heparin   2   Macrolides    Signatures   Electronically signed by : Dago Ortez RN; Oct 27 2016  8:44AM EST                       (Author)

## 2018-01-15 NOTE — MISCELLANEOUS
Message  Call from [facility] to report pt reacted to Rituxan infudion just as rate increased to 300 mg/hr  Developed cough, chills, stridor  Pulse Ox dropped to 80 % placed on O2 4L  BP jumped to 140/90  T 100 3  Hypersensitivity protocol given  Did improve but remains dyspneic  /78  T 101 6  D/W Dr Hu Ibarra  Send pt to ER via ambulance  Pt agreeable  Active Problems    1  Abdominal pain (789 00) (R10 9)   2  Bronchitis (490) (J40)   3  Chronic lymphocytic leukemia (204 10) (C91 10)   4  Chronic maxillary sinusitis (473 0) (J32 0)   5  Coronary artery disease (414 00) (I25 10)   6  Dyslipidemia (272 4) (E78 5)   7  Dysphagia (787 20) (R13 10)   8  Esophagitis, reflux (530 11) (K21 0)   9  Heart disease (429 9) (I51 9)   10  Hemolytic anemia (283 9) (D58 9)   11  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,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)   12  Leukopenia (288 50) (D72 819)   13  Muscle Cramps (729 82)   14  Neutropenia (288 00) (D70 9)   15  Thrombocytopenia (287 5) (D69 6)    Current Meds   1  Carafate 1 GM/10ML Oral Suspension; TAKE 10 ML 4 TIMES DAILY; Therapy: 17HMB5497 to (21 )  Requested for: 99Wgb6598; Last   Rx:47Ffb0778 Ordered   2  Clopidogrel Bisulfate 75 MG Oral Tablet; Take 1 tablet daily Recorded   3  Fenofibrate Micronized 67 MG Oral Capsule; Therapy: (Recorded:02Jan2013) to Recorded   4  Folic Acid 1 MG Oral Tablet; Therapy: (Recorded:07Mar2014) to Recorded   5  Gabapentin 100 MG Oral Capsule; TAKE 1 CAPSULE 3 TIMES DAILY; Therapy: 90YDJ4440 to (Erven Halim)  Requested for: 57Ocu4080; Last   Rx:35Srk4070 Ordered   6  GNP Potassium Gluconate 595 MG Oral Tablet; Take 1 tablet daily Recorded   7  Lisinopril 20 MG Oral Tablet; Take 1 tablet daily Recorded   8  Metoprolol Tartrate 50 MG Oral Tablet; Therapy: 47HCS5903 to (Evaluate:24Zrq6348) Recorded   9  Pantoprazole Sodium 40 MG Oral Tablet Delayed Release (Protonix); TAKE 1 TABLET   TWICE A DAY;    Therapy: 68EJY9098 to (Last Rx:72Xva8308)  Requested for: 16Zyc9891 Ordered   10  Plavix 75 MG Oral Tablet (Clopidogrel Bisulfate); Therapy: (Recorded:16Tcr7687) to Recorded   11  PredniSONE 5 MG Oral Tablet; 1 by mouth daily or as directed  Take with food     Requested for: 56XSO2068; Last Rx:58Fhg4289 Ordered   12  Ranexa 500 MG Oral Tablet Extended Release 12 Hour; Therapy: (Recorded:02Jan2013) to Recorded   13  Simvastatin 40 MG Oral Tablet; Therapy: (Recorded:18Apr2013) to Recorded   14  Ventolin  (90 Base) MCG/ACT Inhalation Aerosol Solution; INHALE 1 TO 2    PUFFS EVERY 4 TO 6 HOURS AS NEEDED; Therapy: 72NOF0767 to (Brandon Molina)  Requested for: 38IDC5169; Last    Rx:09Oct2013 Ordered    Allergies    1  Heparin   2   Macrolides    Signatures   Electronically signed by : Jo Ann Serrano RN; Oct 26 2016 12:14PM EST                       (Author)

## 2018-01-15 NOTE — MISCELLANEOUS
Message  Call from [facility] to report pt has wound on leg and went to an Urgent care  Placed on an antibiotic (pt doesn't know which one)  Afebrile at 98  Instructed to return to Urgent care if worsens and update office  Active Problems    1  Abdominal pain (789 00) (R10 9)   2  Anemia (285 9) (D64 9)   3  Bronchitis (490) (J40)   4  Cellulitis of hand without finger or thumb, right (682 4) (L03 113)   5  Chemotherapy-induced nausea (787 02,E933 1) (R11 0,T45  1X5A)   6  Chronic kidney disease, unspecified CKD stage (585 9) (N18 9)   7  Chronic lymphocytic leukemia (204 10) (C91 10)   8  Chronic maxillary sinusitis (473 0) (J32 0)   9  Coronary artery disease (414 00) (I25 10)   10  Dyslipidemia (272 4) (E78 5)   11  Dysphagia (787 20) (R13 10)   12  Esophagitis, reflux (530 11) (K21 0)   13  Heart disease (429 9) (I51 9)   14  Hemolytic anemia (283 9) (D58 9)   15  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,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)   16  History of hemolytic anemia (V12 3) (Z86 2)   17  Joint pain (719 40) (M25 50)   18  Leukopenia (288 50) (D72 819)   19  Lower extremity edema (782 3) (R60 0)   20  Muscle Cramps (729 82)   21  Neutropenia (288 00) (D70 9)   22  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) (R76 12)   23  Thrombocytopenia (287 5) (D69 6)    Current Meds   1  Allopurinol 100 MG Oral Tablet; 2 daily  Stop if rash; Therapy: 33XDV4460 to (Last Rx:07Wbh9457)  Requested for: 56Pex8524 Ordered   2  Benzonatate 200 MG Oral Capsule; TAKE 1 CAPSULE 3 TIMES DAILY AS NEEDED; Therapy: 58YLU9548 to (Evaluate:25Qrs4654)  Requested for: 36Qjz4311; Last   Rx:74Rzs8650 Ordered   3  Clopidogrel Bisulfate 75 MG Oral Tablet; Take 1 tablet daily Recorded   4  Clotrimazole 10 MG Mouth/Throat Cindy; ALLOW 1 CINDY TO SLOWLY DISSOLVE   IN MOUTH  4 TIMES A DAY; Therapy: 95MGG9594 to (Evaluate:31Fgb9653)  Requested for: 28OGJ4445; Last   Rx:15Mar2017 Ordered   5  Deltasone 20 MG Oral Tablet; two daily with food;    Therapy: 69ZVS8359 to (Last Rx:15Nqn3386)  Requested for: 57Phw8300 Ordered   6  Doxycycline Hyclate 100 MG Oral Tablet; TAKE 1 TABLET EVERY 12 HOURS DAILY; Therapy: 80SPN9879 to (Evaluate:07Nyp5141)  Requested for: 43Bmm6539; Last   Rx:91Lup7994 Ordered   7  Fenofibrate 50 MG Oral Capsule; TAKE 1 CAPSULE DAILY WITH A MEAL; Therapy: (Recorded:84Fhz0143) to Recorded   8  Folic Acid 1 MG Oral Tablet; Therapy: (Recorded:08Pdq8147) to Recorded   9  Gabapentin 100 MG Oral Capsule; TAKE 1 CAPSULE 3 TIMES DAILY; Therapy: 86XRE9095 to (Adele Albright)  Requested for: 59Caw3294; Last   Rx:92Xis3416 Ordered   10  GNP Potassium Gluconate 595 (99 K) MG Oral Tablet; Take 1 tablet daily Recorded   11  Lexapro 10 MG Oral Tablet (Escitalopram Oxalate); TAKE 1 TABLET DAILY; Therapy: (Recorded:22Bpv1944) to Recorded   12  Lidocaine-Prilocaine 2 5-2 5 % External Cream; APPLY 1 INCH TO IV SITE PRIOR TO    CHEMO TREATMENT; Therapy: 39VIH2935 to (Last Rx:15Xxn0748)  Requested for: 98Kqh2217 Ordered   13  Lisinopril 20 MG Oral Tablet; Take 1 tablet daily Recorded   14  Metoprolol Tartrate 50 MG Oral Tablet; Therapy: 01OOX3022 to (Evaluate:96Hvu6572) Recorded   15  Ondansetron 8 MG Oral Tablet Disintegrating; 1 tab PO Q 8 hr PRN nausea; Therapy: 44XDY2512 to (Last Rx:15Mar2017)  Requested for: 71JDJ5451 Ordered   16  Pantoprazole Sodium 40 MG Oral Tablet Delayed Release (Protonix); TAKE 1 TABLET    TWICE A DAY; Therapy: 00DOX1628 to (Last Rx:11Vzq3968)  Requested for: 34Xuk4689 Ordered   17  Plavix 75 MG Oral Tablet (Clopidogrel Bisulfate); Therapy: (Recorded:66Djj8418) to Recorded   18  PredniSONE 20 MG Oral Tablet; TAKE 2 TABLETS DAILY WITH FOOD AS DIRECTED; Therapy: 29DYW8329 to (Evaluate:35Qkg0002)  Requested for: 96CTM3521; Last    Rx:59Qlz1937 Ordered   19  Ranexa 500 MG Oral Tablet Extended Release 12 Hour; Therapy: (Recorded:02Jan2013) to Recorded   20   TraMADol HCl - 50 MG Oral Tablet; TAKE 1 TABLET EVERY 6 HOURS AS NEEDED FOR    ONGOING PAIN;    Therapy: 81ICT9469 to (Last Rx:42Tzn4873) Ordered   21  Venclexta 10 MG Oral Tablet; 12 tabs po daily; Therapy: 13Hlt3970 to (Last Rx:69Bht2074)  Requested for: 91EGJ5382 Ordered    Allergies    1   Heparin    Signatures   Electronically signed by : Amy Jiang RN; Sep 11 2017  9:03AM EST                       (Author)

## 2018-01-15 NOTE — MISCELLANEOUS
Message   Recorded as Task   Date: 06/02/2017 11:29 AM, Created By: Jd Duke   Task Name: Follow Up   Assigned To: Sue Thomas   Regarding Patient: Suraj Bustamante, Status: Active   Jamin Hernandez - 02 Jun 2017 11:29 AM     TASK CREATED  Caller: Maria Menon ; General Medical Question; [phone]  Pharmacy calling to find out if the current Vendexta dosage is temporary or not  They want to be sure to dispense the correct amount  Please call to clarify  Informed dose may change  Dispense as ordered  Active Problems    1  Abdominal pain (789 00) (R10 9)   2  Bronchitis (490) (J40)   3  Chemotherapy-induced nausea (787 02,E933 1) (R11 0,T45  1X5A)   4  Chronic lymphocytic leukemia (204 10) (C91 10)   5  Chronic maxillary sinusitis (473 0) (J32 0)   6  Coronary artery disease (414 00) (I25 10)   7  Dyslipidemia (272 4) (E78 5)   8  Dysphagia (787 20) (R13 10)   9  Esophagitis, reflux (530 11) (K21 0)   10  Heart disease (429 9) (I51 9)   11  Hemolytic anemia (283 9) (D58 9)   12  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,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)   13  Leukopenia (288 50) (D72 819)   14  Lower extremity edema (782 3) (R60 0)   15  Muscle Cramps (729 82)   16  Neutropenia (288 00) (D70 9)   17  Thrombocytopenia (287 5) (D69 6)    Current Meds   1  Acyclovir 400 MG Oral Tablet; 1 tab po bid; Therapy: 24GUK7319 to (Last Rx:15Mar2017)  Requested for: 29QNJ8499 Ordered   2  Acyclovir TABS; 600mg twice a day; Therapy: (Recorded:26Lwn6647) to Recorded   3  Allopurinol 100 MG Oral Tablet; 2 daily  Stop if rash; Therapy: 83OZX5783 to (Last Rx:18Knp5256)  Requested for: 18Xhp5819 Ordered   4  Clopidogrel Bisulfate 75 MG Oral Tablet; Take 1 tablet daily Recorded   5  Clotrimazole 10 MG Mouth/Throat Cindy; ALLOW 1 CINDY TO SLOWLY DISSOLVE   IN MOUTH  4 TIMES A DAY; Therapy: 32VFR0737 to (Evaluate:86Qvu7901)  Requested for: 85EIG4985; Last   Rx:15Mar2017 Ordered   6   Fenofibrate 50 MG Oral Capsule; TAKE 1 CAPSULE DAILY WITH A MEAL; Therapy: (Recorded:05Apr2017) to Recorded   7  Folic Acid 1 MG Oral Tablet; Therapy: (Recorded:07Mar2014) to Recorded   8  Gabapentin 100 MG Oral Capsule; TAKE 1 CAPSULE 3 TIMES DAILY; Therapy: 99CUF8599 to (Virginia Gaspar)  Requested for: 28Wyd2548; Last   Rx:89Fmw7942 Ordered   9  GNP Potassium Gluconate 595 (99 K) MG Oral Tablet; Take 1 tablet daily Recorded   10  LevoFLOXacin 750 MG Oral Tablet; TAKE AS DIRECTED; Therapy: (Recorded:05Apr2017) to Recorded   11  Lexapro 10 MG Oral Tablet (Escitalopram Oxalate); TAKE 1 TABLET DAILY; Therapy: (Recorded:05Apr2017) to Recorded   12  Lisinopril 20 MG Oral Tablet; Take 1 tablet daily Recorded   13  Metoprolol Tartrate 50 MG Oral Tablet; Therapy: 87ULM8229 to (Evaluate:06Jul2014) Recorded   14  Ondansetron 8 MG Oral Tablet Disintegrating; 1 tab PO Q 8 hr PRN nausea; Therapy: 74RFW8600 to (Last Rx:15Mar2017)  Requested for: 13PCE2701 Ordered   15  Pantoprazole Sodium 40 MG Oral Tablet Delayed Release (Protonix); TAKE 1 TABLET    TWICE A DAY; Therapy: 06EHP6558 to (Last Rx:19Srn9219)  Requested for: 29Lev7552 Ordered   16  Plavix 75 MG Oral Tablet (Clopidogrel Bisulfate); Therapy: (Recorded:18May2015) to Recorded   17  PredniSONE 10 MG Oral Tablet; one daily with food; Therapy: 29KNI5427 to (Arthea Angelucci)  Requested for: 88KYK0147; Last    Rx:30Nov2016 Ordered   18  PredniSONE 5 MG Oral Tablet; 1 by mouth daily or as directed  Take with food     Requested for: 59YGD2178; Last Rx:80Yim4942 Ordered   19  Ranexa 500 MG Oral Tablet Extended Release 12 Hour; Therapy: (Recorded:02Jan2013) to Recorded   20  ValACYclovir HCl - 1 GM Oral Tablet; Therapy: (Recorded:30Nov2016) to Recorded   21  Venclexta 10 MG Oral Tablet; 12 tabs po daily; Therapy: 14Apr2017 to (Last Rx:57Iws2445)  Requested for: 83SAW7689 Ordered   22   Ventolin  (90 Base) MCG/ACT Inhalation Aerosol Solution; INHALE 1 TO 2    PUFFS EVERY 4 TO 6 HOURS AS NEEDED; Therapy: 02BBT5179 to (Rajiv Granados)  Requested for: 45DOS0685; Last    Rx:09Oct2013 Ordered    Allergies    1  Heparin   2   Macrolides    Signatures   Electronically signed by : Elvia Connell RN; Jun 2 2017 12:44PM EST                       (Author)

## 2018-01-15 NOTE — MISCELLANEOUS
Message   Recorded as Task   Date: 08/31/2017 08:39 AM, Created By: Fabian Guzman   Task Name: Call Back   Assigned To: Sue Thomas   Regarding Patient: Delroy Ansari, Status: Active   CommentJovanni Gonsalves - 31 Aug 2017 8:39 AM     TASK CREATED  Patient called asking can he take antihistamine([place])has been having a runny nose for last 2 months,not temp  Use cell phone [phone]   Soledad Castellon - 31 Aug 2017 10:02 AM     TASK REASSIGNED: Previously Assigned To [facility] pt  Reached VM  Left message can take Claritin  Active Problems    1  Abdominal pain (789 00) (R10 9)   2  Anemia (285 9) (D64 9)   3  Bronchitis (490) (J40)   4  Chemotherapy-induced nausea (787 02,E933 1) (R11 0,T45  1X5A)   5  Chronic kidney disease, unspecified CKD stage (585 9) (N18 9)   6  Chronic lymphocytic leukemia (204 10) (C91 10)   7  Chronic maxillary sinusitis (473 0) (J32 0)   8  Coronary artery disease (414 00) (I25 10)   9  Dyslipidemia (272 4) (E78 5)   10  Dysphagia (787 20) (R13 10)   11  Esophagitis, reflux (530 11) (K21 0)   12  Heart disease (429 9) (I51 9)   13  Hemolytic anemia (283 9) (D58 9)   14  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,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)   15  History of hemolytic anemia (V12 3) (Z86 2)   16  Joint pain (719 40) (M25 50)   17  Leukopenia (288 50) (D72 819)   18  Lower extremity edema (782 3) (R60 0)   19  Muscle Cramps (729 82)   20  Neutropenia (288 00) (D70 9)   21  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) (R76 12)   22  Thrombocytopenia (287 5) (D69 6)    Current Meds   1  Allopurinol 100 MG Oral Tablet; 2 daily  Stop if rash; Therapy: 40OLK9533 to (Last Rx:89Hjz7081)  Requested for: 31Sov6383 Ordered   2  Benzonatate 200 MG Oral Capsule; TAKE 1 CAPSULE 3 TIMES DAILY AS NEEDED; Therapy: 11WUR6531 to (Evaluate:92Lvg6247)  Requested for: 59ORN7608; Last   Rx:15Jun2017 Ordered   3  Clopidogrel Bisulfate 75 MG Oral Tablet; Take 1 tablet daily Recorded   4  Clotrimazole 10 MG Mouth/Throat Cindy; ALLOW 1 CINDY TO SLOWLY DISSOLVE   IN MOUTH  4 TIMES A DAY; Therapy: 26LNI3151 to (Evaluate:14May2017)  Requested for: 53VIV6619; Last   Rx:15Mar2017 Ordered   5  Deltasone 20 MG Oral Tablet; two daily with food; Therapy: 17JIU0862 to (Last Rx:13Ami8914)  Requested for: 82Zps2078 Ordered   6  Fenofibrate 50 MG Oral Capsule; TAKE 1 CAPSULE DAILY WITH A MEAL; Therapy: (Recorded:05Apr2017) to Recorded   7  Folic Acid 1 MG Oral Tablet; Therapy: (Recorded:07Mar2014) to Recorded   8  Gabapentin 100 MG Oral Capsule; TAKE 1 CAPSULE 3 TIMES DAILY; Therapy: 36HCV4227 to (Adrián Grace)  Requested for: 03Cko0402; Last   Rx:04Aug2015 Ordered   9  GNP Potassium Gluconate 595 (99 K) MG Oral Tablet; Take 1 tablet daily Recorded   10  Lexapro 10 MG Oral Tablet (Escitalopram Oxalate); TAKE 1 TABLET DAILY; Therapy: (Recorded:05Apr2017) to Recorded   11  Lidocaine-Prilocaine 2 5-2 5 % External Cream; APPLY 1 INCH TO IV SITE PRIOR TO    CHEMO TREATMENT; Therapy: 59AYO0067 to (Last Rx:64Ijd8488)  Requested for: 97Hkq8474 Ordered   12  Lisinopril 20 MG Oral Tablet; Take 1 tablet daily Recorded   13  Metoprolol Tartrate 50 MG Oral Tablet; Therapy: 06ONZ9509 to (Evaluate:64Ouf1670) Recorded   14  Ondansetron 8 MG Oral Tablet Disintegrating; 1 tab PO Q 8 hr PRN nausea; Therapy: 38BDE5373 to (Last Rx:15Mar2017)  Requested for: 30CAS4141 Ordered   15  Pantoprazole Sodium 40 MG Oral Tablet Delayed Release (Protonix); TAKE 1 TABLET    TWICE A DAY; Therapy: 31HGP3376 to (Last Rx:25Pwx7802)  Requested for: 25Bla3493 Ordered   16  Plavix 75 MG Oral Tablet (Clopidogrel Bisulfate); Therapy: (Recorded:45Ycw5433) to Recorded   17  PredniSONE 20 MG Oral Tablet; TAKE 2 TABLETS DAILY WITH FOOD AS DIRECTED; Therapy: 80MBG7253 to (Evaluate:42Imy6672)  Requested for: 98UDY4542; Last    Rx:24Ncy9865 Ordered   18  Ranexa 500 MG Oral Tablet Extended Release 12 Hour;     Therapy: (JKJFYYNA:76NZX6887) to Recorded   19  TraMADol HCl - 50 MG Oral Tablet; TAKE 1 TABLET EVERY 6 HOURS AS NEEDED FOR    ONGOING PAIN;    Therapy: 68IZX5498 to (Last Rx:35Adg0858) Ordered   20  Venclexta 10 MG Oral Tablet; 12 tabs po daily; Therapy: 37Ezw6212 to (Last Rx:07Wdy8759)  Requested for: 48LFR4538 Ordered   21  Ventolin  (90 Base) MCG/ACT Inhalation Aerosol Solution; INHALE 1 TO 2    PUFFS EVERY 4 TO 6 HOURS AS NEEDED; Therapy: 99JPX3100 to (Ysabel Mauro)  Requested for: 00JAH0270; Last    Rx:03Qtq1726 Ordered    Allergies    1   Heparin    Signatures   Electronically signed by : Ashley Watkins RN; Aug 31 2017 10:09AM EST                       (Author)

## 2018-01-16 ENCOUNTER — HOSPITAL ENCOUNTER (OUTPATIENT)
Dept: INFUSION CENTER | Facility: CLINIC | Age: 63
Discharge: HOME | End: 2018-01-16
Payer: MEDICARE

## 2018-01-16 VITALS
BODY MASS INDEX: 29.96 KG/M2 | DIASTOLIC BLOOD PRESSURE: 71 MMHG | WEIGHT: 220.9 LBS | SYSTOLIC BLOOD PRESSURE: 140 MMHG | TEMPERATURE: 97.8 F | HEART RATE: 94 BPM | RESPIRATION RATE: 16 BRPM

## 2018-01-16 LAB — B2 MICROGLOB SERPL-MCNC: 3.1 MG/L (ref 0.6–2.4)

## 2018-01-16 PROCEDURE — 96413 CHEMO IV INFUSION 1 HR: CPT

## 2018-01-16 PROCEDURE — 96367 TX/PROPH/DG ADDL SEQ IV INF: CPT

## 2018-01-16 PROCEDURE — 96415 CHEMO IV INFUSION ADDL HR: CPT

## 2018-01-16 RX ORDER — ACYCLOVIR 400 MG/1
400 TABLET ORAL 2 TIMES DAILY
COMMUNITY
End: 2018-09-28 | Stop reason: SDUPTHER

## 2018-01-16 RX ADMIN — ACETAMINOPHEN 650 MG: 325 TABLET, FILM COATED ORAL at 09:07

## 2018-01-16 RX ADMIN — DEXAMETHASONE SODIUM PHOSPHATE 20 MG: 10 INJECTION, SOLUTION INTRAMUSCULAR; INTRAVENOUS at 08:38

## 2018-01-16 RX ADMIN — SODIUM CHLORIDE 20 ML/HR: 0.9 INJECTION, SOLUTION INTRAVENOUS at 08:38

## 2018-01-16 RX ADMIN — DIPHENHYDRAMINE HYDROCHLORIDE 25 MG: 50 INJECTION, SOLUTION INTRAMUSCULAR; INTRAVENOUS at 09:06

## 2018-01-16 RX ADMIN — OBINUTUZUMAB 1000 MG: 1000 INJECTION, SOLUTION, CONCENTRATE INTRAVENOUS at 10:01

## 2018-01-16 NOTE — MISCELLANEOUS
Message  GI Reminder Recall [facility]:   Date: 11/20/2017   Dear Ten Martinez:     Review of our records shows you are due for the following: EGD  Or records indicate that you are due at this time to have a follow-up examination for a EGD  As you know, these tests are done to prevent cancer, a very common disease in the [place] and responsible for thousands of patient deaths each year  We at [facility] are concerned for your health, and would very much appreciate you getting in touch with us at your earliest convenience  Again, this examination is vital to your proper health maintenance and for the prevention of cancer  Please call the following office to schedule your appointment:   [address], [address] [place] [address] [phone]  We look forward to hearing from you!      Sincerely,     [facility]      Signatures   Electronically signed by : Vincent Archer, ; Nov 20 2017 10:09AM EST                       (Author)

## 2018-01-16 NOTE — MISCELLANEOUS
Message   Recorded as Task   Date: 07/21/2017 10:50 AM, Created By: Johnnie Blackwell   Task Name: Follow Up   Assigned To: Lyndsay Diaz   Regarding Patient: Sy Leiva, Status: Active   Comment:    Candice Krueger - 21 Jul 2017 10:50 AM     TASK CREATED  Caller: Self; General Medical Question; [phone] (Home)  Pt called and is asking for a call back from you ASAP  He has concerns about his transfusions for tomorrow and Monday  Also questions about the directions for the 60mg Prednisone  Should he scatter it (Morning, noon and night) or take it all at once  Please call  spoke to patient on 7/21/17  take 60 mg prednisone PO daily in AM  2 unit PRBC tomorrw, 1 unit monday 7/24/17        Signatures   Electronically signed by : CHRISTIAN Garza; Jul 25 2017  9:12AM EST                       (Author)

## 2018-01-16 NOTE — MISCELLANEOUS
Message   Recorded as Task   Date: 03/09/2017 09:22 AM, Created By: Nataliya Crane   Task Name: Call Back   Assigned To: Sue Thomas   Regarding Patient: Edith RAYMOND, Status: Active   Comment:    Cindy Dyer - 09 Mar 2017 9:22 AM     TASK CREATED  CALL FROM PT SAYING HE HAD VERY BLACK STOOL THIS MORNING AND IS CONCERNED BECAUSE IT HAS NEVER HAPPENED BEFORE    PLEASE CALL HIM [phone]   D/W Dr Pia Henning who called PCP  Plan to direct admit pt  Pt informed to wait for call from admitting  Active Problems    1  Abdominal pain (789 00) (R10 9)   2  Bronchitis (490) (J40)   3  Chronic lymphocytic leukemia (204 10) (C91 10)   4  Chronic maxillary sinusitis (473 0) (J32 0)   5  Coronary artery disease (414 00) (I25 10)   6  Dyslipidemia (272 4) (E78 5)   7  Dysphagia (787 20) (R13 10)   8  Esophagitis, reflux (530 11) (K21 0)   9  Heart disease (429 9) (I51 9)   10  Hemolytic anemia (283 9) (D58 9)   11  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,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)   12  Leukopenia (288 50) (D72 819)   13  Muscle Cramps (729 82)   14  Neutropenia (288 00) (D70 9)   15  Thrombocytopenia (287 5) (D69 6)    Current Meds   1  Acyclovir TABS; 600mg twice a day; Therapy: (Recorded:01Feb2017) to Recorded   2  Allopurinol 100 MG Oral Tablet; 2 daily  Stop if rash; Therapy: 03GXB9367 to (Last Rx:48Nwu9575)  Requested for: 22Feb2017 Ordered   3  Carafate 1 GM/10ML Oral Suspension; TAKE 10 ML 4 TIMES DAILY; Therapy: 93CAC1110 to (Chaim Shoemaker)  Requested for: 08Sep2015; Last   Rx:08Sep2015 Ordered   4  Clopidogrel Bisulfate 75 MG Oral Tablet; Take 1 tablet daily Recorded   5  Fenofibrate Micronized 67 MG Oral Capsule; Therapy: (Recorded:02Jan2013) to Recorded   6  Folic Acid 1 MG Oral Tablet; Therapy: (Recorded:07Mar2014) to Recorded   7  Gabapentin 100 MG Oral Capsule; TAKE 1 CAPSULE 3 TIMES DAILY; Therapy: 94NUO0366 to (Marita Forte)  Requested for: 90Ngd1135;  Last   Rx:42Gpv8186 Ordered 8  GNP Potassium Gluconate 595 (99 K) MG Oral Tablet; Take 1 tablet daily Recorded   9  Lisinopril 20 MG Oral Tablet; Take 1 tablet daily Recorded   10  Metoprolol Tartrate 50 MG Oral Tablet; Therapy: 72CCN2400 to (Evaluate:17Djt5004) Recorded   11  Pantoprazole Sodium 40 MG Oral Tablet Delayed Release (Protonix); TAKE 1 TABLET    TWICE A DAY; Therapy: 48WSA6271 to (Last Rx:82Eoq2398)  Requested for: 65Mkm3413 Ordered   12  Plavix 75 MG Oral Tablet (Clopidogrel Bisulfate); Therapy: (Recorded:86Aoz8915) to Recorded   13  PredniSONE 10 MG Oral Tablet; one daily with food; Therapy: 20AGE2568 to (Karey Marrero)  Requested for: 02PZQ5349; Last    Rx:30Nov2016 Ordered   14  PredniSONE 5 MG Oral Tablet; 1 by mouth daily or as directed  Take with food     Requested for: 03OTJ3359; Last Rx:69Iyu6044 Ordered   15  Ranexa 500 MG Oral Tablet Extended Release 12 Hour; Therapy: (Recorded:02Jan2013) to Recorded   16  Simvastatin 40 MG Oral Tablet; Therapy: (Recorded:18Apr2013) to Recorded   17  ValACYclovir HCl - 1 GM Oral Tablet; Therapy: (Recorded:30Nov2016) to Recorded   18  Ventolin  (90 Base) MCG/ACT Inhalation Aerosol Solution; INHALE 1 TO 2    PUFFS EVERY 4 TO 6 HOURS AS NEEDED; Therapy: 81UPL5893 to (Sharri Rubinstein)  Requested for: 07GJB0543; Last    Rx:89Xdb7178 Ordered    Allergies    1  Heparin   2   Macrolides    Signatures   Electronically signed by : Amy Jiang RN; Mar  9 2017  9:40AM EST                       (Author)

## 2018-01-16 NOTE — MISCELLANEOUS
Message  left message on cell phone with instructions to take venetoclax 40 mg daily  call our office with questions  Signatures   Electronically signed by : CHRISTIAN Busby;  Apr 26 2017  7:09PM EST                       (Author)

## 2018-01-16 NOTE — MISCELLANEOUS
Message  spoke with pt regarding labs from today (10/6/17)  WBC 1 6, ANC 0 62  Confirmed that pt is not taking the Imbruvica due to insurance costs  He is taking Prednisone 20mg, two tablets daily along with folic acid  He will have repeat labs done this monday (10/9)  Dr Chris Breath aware  Active Problems    1  Abdominal pain (789 00) (R10 9)   2  Anemia (285 9) (D64 9)   3  Cellulitis of hand without finger or thumb, right (682 4) (L03 113)   4  Chemotherapy-induced nausea (787 02,E933 1) (R11 0,T45  1X5A)   5  Chronic kidney disease, unspecified CKD stage (585 9) (N18 9)   6  Chronic lymphocytic leukemia (204 10) (C91 10)   7  Chronic maxillary sinusitis (473 0) (J32 0)   8  Coronary artery disease (414 00) (I25 10)   9  Dyslipidemia (272 4) (E78 5)   10  Dysphagia (787 20) (R13 10)   11  Esophagitis, reflux (530 11) (K21 0)   12  Heart disease (429 9) (I51 9)   13  Hemolytic anemia (283 9) (D58 9)   14  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,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)   15  History of hemolytic anemia (V12 3) (Z86 2)   16  Hypokalemia (276 8) (E87 6)   17  Joint pain (719 40) (M25 50)   18  Leukopenia (288 50) (D72 819)   19  Lower extremity edema (782 3) (R60 0)   20  Muscle Cramps (729 82)   21  Neutropenia (288 00) (D70 9)   22  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) (R76 12)   23  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,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,T38 0X5A)   24  Thrombocytopenia (287 5) (D69 6)   25  Ulcer of esophagus without bleeding (530 20) (K22 10)    Current Meds   1  Aspirin 81 MG Oral Tablet Delayed Release; Therapy: 21Dbg1111 to Recorded   2  Benzonatate 200 MG Oral Capsule; TAKE 1 CAPSULE 3 TIMES DAILY AS NEEDED; Therapy: 46SWF1245 to (Evaluate:07Oct2017)  Requested for: 12Pml3227; Last   Rx:75Pjf1890 Ordered   3  Carafate 1 GM/10ML Oral Suspension; TAKE 10 ML 4 TIMES DAILY; Therapy: 41CEH6172 to (Tito Rodriguez)  Requested for: 71RNY6035; Last   Rx:05Oct2017 Ordered   4   Citalopram Hydrobromide 40 MG Oral Tablet; Therapy: 62Iny2576 to Recorded   5  Clotrimazole 10 MG Mouth/Throat Cindy; ALLOW 1 CINDY TO SLOWLY DISSOLVE   IN MOUTH  4 TIMES A DAY; Therapy: 02IYD9797 to (Evaluate:21Wlx2995)  Requested for: 20FUG0235; Last   Rx:15Mar2017 Ordered   6  Doxycycline Hyclate 100 MG Oral Tablet; TAKE 1 TABLET EVERY 12 HOURS DAILY; Therapy: 77CBQ7845 to (Evaluate:28Rxp2798)  Requested for: 31Czn2848; Last   Rx:05Sep2017 Ordered   7  Fenofibrate 50 MG Oral Capsule; TAKE 1 CAPSULE DAILY WITH A MEAL; Therapy: (Recorded:05Apr2017) to Recorded   8  Folic Acid 1 MG Oral Tablet; Therapy: (Recorded:07Mar2014) to Recorded   9  Gabapentin 100 MG Oral Capsule; TAKE 1 CAPSULE 3 TIMES DAILY; Therapy: 82LHU9623 to (Jinx Hammans)  Requested for: 89Gro3413; Last   Rx:65Swq6030 Ordered   10  Gazyva 1000 MG/40ML Intravenous Solution; Therapy: 23Bes8203 to Recorded   11  GNP Potassium Gluconate 595 (99 K) MG Oral Tablet; Take 1 tablet daily Recorded   12  Imbruvica 140 MG Oral Capsule; TAKE 2 CAPSULE Daily; Therapy: 57Gju5448 to (Evaluate:34Gfa3893)  Requested for: 63Moi8971; Last    Rx:77Adc2512 Ordered   13  Ondansetron 8 MG Oral Tablet Disintegrating; 1 tab PO Q 8 hr PRN nausea; Therapy: 97KEB2212 to (Last Rx:15Mar2017)  Requested for: 21PBN8351 Ordered   14  Pantoprazole Sodium 40 MG Oral Tablet Delayed Release (Protonix); TAKE 1 TABLET    TWICE A DAY; Therapy: 49UNY6927 to (Last Rx:54Kis6123)  Requested for: 81Imn9222 Ordered   15  Potassium Chloride ER 10 MEQ Oral Tablet Extended Release; one daily  or as advised; Therapy: 99SBE4192 to ([id number])  Requested for: 58KTG6844; Last    Rx:68Fie2488 Ordered   16  PredniSONE 20 MG Oral Tablet; TAKE 2 TABLETS DAILY WITH FOOD AS DIRECTED; Therapy: 03YGR0890 to (Evaluate:81Mqc4233)  Requested for: 16UUA9285; Last    Rx:63Lgd5298 Ordered   17  SLPG Magic Mouthwash 1:1:1 maalox/diphenhydramine/lidocaine; 15 ml PO q6 prn;     Therapy: 32MWM7777 to (Evaluate:79Rpx3915); Last Rx:46Gly0864 Ordered   18  TraMADol HCl - 50 MG Oral Tablet; TAKE 1 TABLET EVERY 6 HOURS AS NEEDED FOR    ONGOING PAIN;    Therapy: 28XBL7781 to (Last Rx:97Ryk4758) Ordered    Allergies    1   Heparin    Signatures   Electronically signed by : Neyda Lim RN; Oct  6 2017  1:28PM EST                       (Author)

## 2018-01-16 NOTE — MISCELLANEOUS
Message   Recorded as Task   Date: 06/29/2017 07:08 PM, Created By: Danyell España   Task Name: Call Back   Assigned To: Maren Mccarthy   Regarding Patient: Valentina Corbett, Status: Active   Comment:    Lyndsay Diaz - 29 Jun 2017 7:08 PM     TASK CREATED  please call and instruct to continue to stay on 100 mg PO daily of Veneteclax  platelets 180   Spoke with patient - instructions reviewed  Active Problems    1  Abdominal pain (789 00) (R10 9)   2  Bronchitis (490) (J40)   3  Chemotherapy-induced nausea (787 02,E933 1) (R11 0,T45  1X5A)   4  Chronic lymphocytic leukemia (204 10) (C91 10)   5  Chronic maxillary sinusitis (473 0) (J32 0)   6  Coronary artery disease (414 00) (I25 10)   7  Dyslipidemia (272 4) (E78 5)   8  Dysphagia (787 20) (R13 10)   9  Esophagitis, reflux (530 11) (K21 0)   10  Heart disease (429 9) (I51 9)   11  Hemolytic anemia (283 9) (D58 9)   12  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,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)   13  Leukopenia (288 50) (D72 819)   14  Lower extremity edema (782 3) (R60 0)   15  Muscle Cramps (729 82)   16  Neutropenia (288 00) (D70 9)   17  Thrombocytopenia (287 5) (D69 6)    Current Meds   1  Acyclovir 400 MG Oral Tablet; 1 tab po bid; Therapy: 98VWW4155 to (Last Rx:15Mar2017)  Requested for: 16SQO7834 Ordered   2  Acyclovir TABS; 600mg twice a day; Therapy: (Recorded:21Nwd0654) to Recorded   3  Allopurinol 100 MG Oral Tablet; 2 daily  Stop if rash; Therapy: 38XDY7482 to (Last Rx:07Nlh6022)  Requested for: 45Wdn6225 Ordered   4  Benzonatate 200 MG Oral Capsule; TAKE 1 CAPSULE 3 TIMES DAILY AS NEEDED; Therapy: 64MQM9758 to (Evaluate:14Agt5021)  Requested for: 17DMY6775; Last   Rx:15Jun2017 Ordered   5  Clopidogrel Bisulfate 75 MG Oral Tablet; Take 1 tablet daily Recorded   6  Clotrimazole 10 MG Mouth/Throat Cindy; ALLOW 1 CINDY TO SLOWLY DISSOLVE   IN MOUTH  4 TIMES A DAY;    Therapy: 14UJF4187 to (Evaluate:27Nyl6371)  Requested for: 68QPV7554; Last Rx:15Mar2017 Ordered   7  Fenofibrate 50 MG Oral Capsule; TAKE 1 CAPSULE DAILY WITH A MEAL; Therapy: (Recorded:05Apr2017) to Recorded   8  Folic Acid 1 MG Oral Tablet; Therapy: (Recorded:07Mar2014) to Recorded   9  Gabapentin 100 MG Oral Capsule; TAKE 1 CAPSULE 3 TIMES DAILY; Therapy: 36SGV1506 to (Valentino Crenshaw)  Requested for: 19Qme5590; Last   Rx:17Xsh9097 Ordered   10  GNP Potassium Gluconate 595 (99 K) MG Oral Tablet; Take 1 tablet daily Recorded   11  LevoFLOXacin 750 MG Oral Tablet; TAKE AS DIRECTED; Therapy: (Recorded:05Apr2017) to Recorded   12  Lexapro 10 MG Oral Tablet (Escitalopram Oxalate); TAKE 1 TABLET DAILY; Therapy: (Recorded:05Apr2017) to Recorded   13  Lisinopril 20 MG Oral Tablet; Take 1 tablet daily Recorded   14  Metoprolol Tartrate 50 MG Oral Tablet; Therapy: 97AHJ9444 to (Evaluate:49Qup0988) Recorded   15  Ondansetron 8 MG Oral Tablet Disintegrating; 1 tab PO Q 8 hr PRN nausea; Therapy: 42LKC9670 to (Last Rx:15Mar2017)  Requested for: 16QDZ1873 Ordered   16  Pantoprazole Sodium 40 MG Oral Tablet Delayed Release (Protonix); TAKE 1 TABLET    TWICE A DAY; Therapy: 22ZQC5826 to (Last Rx:43Fun7191)  Requested for: 35Hxy1184 Ordered   17  Plavix 75 MG Oral Tablet (Clopidogrel Bisulfate); Therapy: (Recorded:18May2015) to Recorded   18  PredniSONE 10 MG Oral Tablet; one daily with food; Therapy: 51QVV9848 to (Rufina Solomon)  Requested for: 86OMI4409; Last    Rx:30Nov2016 Ordered   19  PredniSONE 5 MG Oral Tablet; 1 by mouth daily or as directed  Take with food     Requested for: 44ZVB0973; Last Rx:61Ifp4313 Ordered   20  Ranexa 500 MG Oral Tablet Extended Release 12 Hour; Therapy: (Recorded:02Jan2013) to Recorded   21  ValACYclovir HCl - 1 GM Oral Tablet; Therapy: (Recorded:30Nov2016) to Recorded   22  Venclexta 10 MG Oral Tablet; 12 tabs po daily; Therapy: 14Apr2017 to (Last Rx:87Pap5153)  Requested for: 31PHT5290 Ordered   23   [facility] 108 (90 Base) MCG/ACT Inhalation Aerosol Solution; INHALE 1 TO 2    PUFFS EVERY 4 TO 6 HOURS AS NEEDED; Therapy: 77OMZ9486 to ([address])  Requested for: 34XMT7686; Last    Rx:09Oct2013 Ordered    Allergies    1  Heparin   2   Macrolides    Signatures   Electronically signed by : Shahrzad Borges, ; Jun 30 2017  8:17AM EST                       (Author)

## 2018-01-16 NOTE — PLAN OF CARE
Problem: Potential for Falls  Goal: Patient will remain free of falls  INTERVENTIONS:  - Assess patient frequently for physical needs  -  Identify cognitive and physical deficits and behaviors that affect risk of falls    -  [place] fall precautions as indicated by assessment   - Educate patient/family on patient safety including physical limitations  - Instruct patient to call for assistance with activity based on assessment  - Modify environment to reduce risk of injury  - Consider OT/PT consult to assist with strengthening/mobility   Outcome: Progressing

## 2018-01-16 NOTE — MISCELLANEOUS
Provider Comments  Pt no showed for appointment on 10/10/17          Signatures   Electronically signed by : Esteban Kamara, ; Oct 10 2017 11:46AM EST                       (Author)

## 2018-01-16 NOTE — MISCELLANEOUS
Message     Recorded as Task   Date: 04/17/2017 11:50 AM, Created By: Prince Long   Task Name: Follow Up   Assigned To: Tabby Echols   Regarding Patient: Ten Heck, Status: In Progress   Comment:    Candice Krueger - 17 Apr 2017 11:50 AM     TASK CREATED  Caller: Self; General Medical Question; [phone] (Mobile Phone)  Pt saw his cardiologist today and was given a new RX  He is calling to be sure it's OK with Dr Dipti Grace for him to take  Please call  Aletha Roberson - 17 Apr 2017 1:13 PM     TASK REASSIGNED: Previously Assigned To Spike Merritt - 17 Apr 2017 1:18 PM     TASK EDITED  Left message for patient to return our call regarding his new prescription from his cardiologist    Megan De Oliveira - 17 Apr 2017 1:18 PM     TASK IN PROGRESS   Megan De Oliveira - 17 Apr 2017 1:58 PM     TASK EDITED  Spoke with patient regarding his new prescriptions from his cardiologist today  Relayed this message to Dr Dipti Grace and he is ok with patient taking the new prescriptions  Patient verbalized understanding and will get prescriptions filled  Active Problems    1  Abdominal pain (789 00) (R10 9)   2  Bronchitis (490) (J40)   3  Chemotherapy-induced nausea (787 02,E933 1) (R11 0,T45  1X5A)   4  Chronic lymphocytic leukemia (204 10) (C91 10)   5  Chronic maxillary sinusitis (473 0) (J32 0)   6  Coronary artery disease (414 00) (I25 10)   7  Dyslipidemia (272 4) (E78 5)   8  Dysphagia (787 20) (R13 10)   9  Esophagitis, reflux (530 11) (K21 0)   10  Heart disease (429 9) (I51 9)   11  Hemolytic anemia (283 9) (D58 9)   12  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,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)   13  Leukopenia (288 50) (D72 819)   14  Lower extremity edema (782 3) (R60 0)   15  Muscle Cramps (729 82)   16  Neutropenia (288 00) (D70 9)   17  Thrombocytopenia (287 5) (D69 6)    Current Meds   1  Acyclovir 400 MG Oral Tablet; 1 tab po bid; Therapy: 23UES1725 to (Last Rx:15Mar2017)  Requested for: 15AMZ3156 Ordered   2  Acyclovir TABS; 600mg twice a day; Therapy: (Recorded:02Ifb5886) to Recorded   3  Allopurinol 100 MG Oral Tablet; 2 daily  Stop if rash; Therapy: 24ATB1569 to (Last Rx:41Tlo1381)  Requested for: 22Feb2017 Ordered   4  Carafate 1 GM/10ML Oral Suspension; TAKE 10 ML 4 TIMES DAILY; Therapy: 24EGN3883 to ()  Requested for: 69Jjq1057; Last   Rx:08Sep2015 Ordered   5  Clopidogrel Bisulfate 75 MG Oral Tablet; Take 1 tablet daily Recorded   6  Clotrimazole 10 MG Mouth/Throat Cindy; ALLOW 1 CINDY TO SLOWLY DISSOLVE   IN MOUTH  4 TIMES A DAY; Therapy: 21KSA4711 to (Evaluate:17Ohl9308)  Requested for: 03PWP5038; Last   Rx:15Mar2017 Ordered   7  Fenofibrate 50 MG Oral Capsule; TAKE 1 CAPSULE DAILY WITH A MEAL; Therapy: (Recorded:05Apr2017) to Recorded   8  Folic Acid 1 MG Oral Tablet; Therapy: (Recorded:07Mar2014) to Recorded   9  Gabapentin 100 MG Oral Capsule; TAKE 1 CAPSULE 3 TIMES DAILY; Therapy: 44ADM6472 to (Coleen Garrison)  Requested for: 29Hhs9149; Last   Rx:04Aug2015 Ordered   10  GNP Potassium Gluconate 595 (99 K) MG Oral Tablet; Take 1 tablet daily Recorded   11  LevoFLOXacin 750 MG Oral Tablet; TAKE AS DIRECTED; Therapy: (Recorded:05Apr2017) to Recorded   12  Lexapro 10 MG Oral Tablet (Escitalopram Oxalate); TAKE 1 TABLET DAILY; Therapy: (Recorded:05Apr2017) to Recorded   13  Lisinopril 20 MG Oral Tablet; Take 1 tablet daily Recorded   14  Metoprolol Tartrate 50 MG Oral Tablet; Therapy: 75AVF3685 to (Evaluate:37Kgw5353) Recorded   15  Ondansetron 8 MG Oral Tablet Dispersible; 1 tab PO Q 8 hr PRN nausea; Therapy: 34SVQ7940 to (Last Rx:15Mar2017)  Requested for: 02IYX2673 Ordered   16  Pantoprazole Sodium 40 MG Oral Tablet Delayed Release (Protonix); TAKE 1 TABLET    TWICE A DAY; Therapy: 79BLS1013 to (Last Rx:80Lgz8160)  Requested for: 58Gjx8472 Ordered   17  Plavix 75 MG Oral Tablet (Clopidogrel Bisulfate); Therapy: (Recorded:53Gvn2947) to Recorded   18  PredniSONE 10 MG Oral Tablet; one daily with food; Therapy: 47NXC4034 to (Vestatammi Cyr)  Requested for: 59MLK7701; Last    Rx:30Nov2016 Ordered   19  PredniSONE 5 MG Oral Tablet; 1 by mouth daily or as directed  Take with food     Requested for: 39XSC0459; Last Rx:53Uqm1990 Ordered   20  Ranexa 500 MG Oral Tablet Extended Release 12 Hour; Therapy: (Recorded:02Jan2013) to Recorded   21  ValACYclovir HCl - 1 GM Oral Tablet; Therapy: (Recorded:30Nov2016) to Recorded   22  Venclexta 10 MG Oral Tablet; 2 TABS PO DAILY X 1 WEEK THEN INCREASE TO 4 TABS    PO DAILY; Therapy: 29Pqx4553 to (Last Rx:34Xwh6389)  Requested for: 78Ubn9572; Status: ACTIVE    - Retrospective Authorization Ordered   23  Ventolin  (90 Base) MCG/ACT Inhalation Aerosol Solution; INHALE 1 TO 2    PUFFS EVERY 4 TO 6 HOURS AS NEEDED; Therapy: 49VHK5946 to (Lewis Paulino)  Requested for: 25EVZ4390; Last    Rx:09Oct2013 Ordered    Allergies    1  Heparin   2  Macrolides    Signatures   Electronically signed by :  Maria Rivera RN; Apr 17 2017  1:59PM EST                       (Author)

## 2018-01-16 NOTE — PROGRESS NOTES
Patient arrived on unit  Denies any discomforts  Premedicated as ordered and Gazyva initiated as ordered   Titrated as per dennis

## 2018-01-17 NOTE — MISCELLANEOUS
Message   Recorded as Task   Date: 09/07/2017 11:19 AM, Created By: Josh Knight   Task Name: Med Renewal Request   Assigned To: Sue Thomas   Regarding Patient: Prudence Party, Status: Active   CommentArcolette Hernandez - 07 Sep 2017 11:19 AM     TASK CREATED  Patient called needs a refill for Benzonatate 200mg  Has been taking since April  Is asking does Dr want this refilled  Has 3 pills left  Please call and let him know if it will be refilled or not  Will be picking up another prescription today at [facility] PHONE [phone]   Spoke with pt to check if still has cough and reports he does and it helps  Script sent  Active Problems    1  Abdominal pain (789 00) (R10 9)   2  Anemia (285 9) (D64 9)   3  Bronchitis (490) (J40)   4  Cellulitis of hand without finger or thumb, right (682 4) (L03 113)   5  Chemotherapy-induced nausea (787 02,E933 1) (R11 0,T45  1X5A)   6  Chronic kidney disease, unspecified CKD stage (585 9) (N18 9)   7  Chronic lymphocytic leukemia (204 10) (C91 10)   8  Chronic maxillary sinusitis (473 0) (J32 0)   9  Coronary artery disease (414 00) (I25 10)   10  Dyslipidemia (272 4) (E78 5)   11  Dysphagia (787 20) (R13 10)   12  Esophagitis, reflux (530 11) (K21 0)   13  Heart disease (429 9) (I51 9)   14  Hemolytic anemia (283 9) (D58 9)   15  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,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)   16  History of hemolytic anemia (V12 3) (Z86 2)   17  Joint pain (719 40) (M25 50)   18  Leukopenia (288 50) (D72 819)   19  Lower extremity edema (782 3) (R60 0)   20  Muscle Cramps (729 82)   21  Neutropenia (288 00) (D70 9)   22  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) (R76 12)   23  Thrombocytopenia (287 5) (D69 6)    Current Meds   1  Allopurinol 100 MG Oral Tablet; 2 daily  Stop if rash; Therapy: 41XZX3537 to (Last Rx:98Ebe5182)  Requested for: 49Vdj7470 Ordered   2  Clopidogrel Bisulfate 75 MG Oral Tablet; Take 1 tablet daily Recorded   3   Clotrimazole 10 MG Mouth/Throat Cindy; ALLOW 1 CINDY TO SLOWLY DISSOLVE   IN MOUTH  4 TIMES A DAY; Therapy: 62KZN8134 to (Evaluate:25Xug5671)  Requested for: 76TNX7706; Last   Rx:15Mar2017 Ordered   4  Deltasone 20 MG Oral Tablet; two daily with food; Therapy: 83IJZ3985 to (Last Rx:19Cfy0360)  Requested for: 72Anh0685 Ordered   5  Doxycycline Hyclate 100 MG Oral Tablet; TAKE 1 TABLET EVERY 12 HOURS DAILY; Therapy: 86BQN9917 to (Evaluate:11Avq3634)  Requested for: 50Uvp5901; Last   Rx:29Isw8642 Ordered   6  Fenofibrate 50 MG Oral Capsule; TAKE 1 CAPSULE DAILY WITH A MEAL; Therapy: (Recorded:05Apr2017) to Recorded   7  Folic Acid 1 MG Oral Tablet; Therapy: (Recorded:07Mar2014) to Recorded   8  Gabapentin 100 MG Oral Capsule; TAKE 1 CAPSULE 3 TIMES DAILY; Therapy: 01NDA9960 to (Jacqueline Meyer)  Requested for: 78Mas0880; Last   Rx:04Aug2015 Ordered   9  GNP Potassium Gluconate 595 (99 K) MG Oral Tablet; Take 1 tablet daily Recorded   10  Lexapro 10 MG Oral Tablet (Escitalopram Oxalate); TAKE 1 TABLET DAILY; Therapy: (Recorded:05Apr2017) to Recorded   11  Lidocaine-Prilocaine 2 5-2 5 % External Cream; APPLY 1 INCH TO IV SITE PRIOR TO    CHEMO TREATMENT; Therapy: 32VIL6312 to (Last Rx:54Xxs0396)  Requested for: 98Rdw9661 Ordered   12  Lisinopril 20 MG Oral Tablet; Take 1 tablet daily Recorded   13  Metoprolol Tartrate 50 MG Oral Tablet; Therapy: 34ESO4383 to (Evaluate:49Vvv7624) Recorded   14  Ondansetron 8 MG Oral Tablet Disintegrating; 1 tab PO Q 8 hr PRN nausea; Therapy: 32IFD7028 to (Last Rx:15Mar2017)  Requested for: 87JNA6141 Ordered   15  Pantoprazole Sodium 40 MG Oral Tablet Delayed Release (Protonix); TAKE 1 TABLET    TWICE A DAY; Therapy: 71WQM7501 to (Last Rx:39Rzq1512)  Requested for: 83Zhv7187 Ordered   16  Plavix 75 MG Oral Tablet (Clopidogrel Bisulfate); Therapy: (Recorded:92Trw9675) to Recorded   17  PredniSONE 20 MG Oral Tablet; TAKE 2 TABLETS DAILY WITH FOOD AS DIRECTED;     Therapy: 31YKP8265 to (Evaluate:20Gzp2061)  Requested for: 71CIB2056; Last    Rx:89Fhr5449 Ordered   18  Ranexa 500 MG Oral Tablet Extended Release 12 Hour; Therapy: (Recorded:02Jan2013) to Recorded   19  TraMADol HCl - 50 MG Oral Tablet; TAKE 1 TABLET EVERY 6 HOURS AS NEEDED FOR    ONGOING PAIN;    Therapy: 40PDS7727 to (Last Rx:59Qhi2021) Ordered   20  Venclexta 10 MG Oral Tablet; 12 tabs po daily; Therapy: 14Apr2017 to (Last Rx:63Vuk3173)  Requested for: 32DHF2063 Ordered    Allergies    1   Heparin    Plan  Bronchitis    · Benzonatate 200 MG Oral Capsule; TAKE 1 CAPSULE 3 TIMES DAILY AS  NEEDED    Signatures   Electronically signed by : Susan Ambriz RN; Sep  7 2017 11:26AM EST                       (Author)

## 2018-01-17 NOTE — MISCELLANEOUS
Message   Recorded as Task   Date: 10/10/2017 03:03 PM, Created By: AISHWARYA AGUERO   Task Name: Call Back   Assigned To: Sue Thomas   Regarding Patient: Sarah Yepez, Status: Active   Comment:    Christina Merritt - 10 Oct 2017 3:03 PM     TASK CREATED  Pt called to let you know he is approved for imbruvica  He would like a call back  #: [phone]   Spoke with pt and informed aware of his approval by [employer]  Script faxed to [facility]  Informed pt it may take several days to be processed  Active Problems    1  Abdominal pain (789 00) (R10 9)   2  Anemia (285 9) (D64 9)   3  Cellulitis of hand without finger or thumb, right (682 4) (L03 113)   4  Chemotherapy-induced nausea (787 02,E933 1) (R11 0,T45  1X5A)   5  Chronic kidney disease, unspecified CKD stage (585 9) (N18 9)   6  Chronic lymphocytic leukemia (204 10) (C91 10)   7  Chronic maxillary sinusitis (473 0) (J32 0)   8  Coronary artery disease (414 00) (I25 10)   9  Dyslipidemia (272 4) (E78 5)   10  Dysphagia (787 20) (R13 10)   11  Esophagitis, reflux (530 11) (K21 0)   12  Heart disease (429 9) (I51 9)   13  Hemolytic anemia (283 9) (D58 9)   14  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,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)   15  History of hemolytic anemia (V12 3) (Z86 2)   16  Hypokalemia (276 8) (E87 6)   17  Joint pain (719 40) (M25 50)   18  Leukopenia (288 50) (D72 819)   19  Lower extremity edema (782 3) (R60 0)   20  Muscle Cramps (729 82)   21  Neutropenia (288 00) (D70 9)   22  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) (R76 12)   23  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,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,T38 0X5A)   24  Thrombocytopenia (287 5) (D69 6)   25  Ulcer of esophagus without bleeding (530 20) (K22 10)    Current Meds   1  Aspirin 81 MG Oral Tablet Delayed Release; Therapy: 47Lje9576 to Recorded   2  Benzonatate 200 MG Oral Capsule; TAKE 1 CAPSULE 3 TIMES DAILY AS NEEDED; Therapy: 24ARX8531 to (Evaluate:07Oct2017)  Requested for: 07Sep2017; Last   Rx:07Sep2017 Ordered   3  Carafate 1 GM/10ML Oral Suspension; TAKE 10 ML 4 TIMES DAILY; Therapy: 17QFW2625 to ([facility])  Requested for: 75MAY1873; Last   Rx:05Oct2017 Ordered   4  Citalopram Hydrobromide 40 MG Oral Tablet; Therapy: 13Mqy9089 to Recorded   5  Clotrimazole 10 MG Mouth/Throat Cindy; ALLOW 1 CINDY TO SLOWLY DISSOLVE   IN MOUTH  4 TIMES A DAY; Therapy: 97GYA8265 to (Evaluate:25Yaw2161)  Requested for: 99TCA6657; Last   Rx:15Mar2017 Ordered   6  Doxycycline Hyclate 100 MG Oral Tablet; TAKE 1 TABLET EVERY 12 HOURS DAILY; Therapy: 61PFW9652 to (Evaluate:15Sep2017)  Requested for: 05Sep2017; Last   Rx:05Sep2017 Ordered   7  Fenofibrate 50 MG Oral Capsule; TAKE 1 CAPSULE DAILY WITH A MEAL; Therapy: (Recorded:05Apr2017) to Recorded   8  Folic Acid 1 MG Oral Tablet; Therapy: (Recorded:07Mar2014) to Recorded   9  Gabapentin 100 MG Oral Capsule; TAKE 1 CAPSULE 3 TIMES DAILY; Therapy: 31QYZ3402 to ([employer])  Requested for: 13Ssd7496; Last   Rx:96Uix7345 Ordered   10  Gazyva 1000 MG/40ML Intravenous Solution; Therapy: 11Sep2017 to Recorded   11  GNP Potassium Gluconate 595 (99 K) MG Oral Tablet; Take 1 tablet daily Recorded   12  Imbruvica 140 MG Oral Capsule; TAKE 2 CAPSULE Daily; Therapy: 99Hjr0447 to (Evaluate:08Jan2018)  Requested for: 94YGM5651; Last    Rx:10Oct2017; Status: ACTIVE - Retrospective By Protocol Authorization Ordered   13  Ondansetron 8 MG Oral Tablet Disintegrating; 1 tab PO Q 8 hr PRN nausea; Therapy: 20CYS5451 to (Last Rx:15Mar2017)  Requested for: 24SCF2888 Ordered   14  Pantoprazole Sodium 40 MG Oral Tablet Delayed Release (Protonix); TAKE 1 TABLET    TWICE A DAY; Therapy: 58XND3529 to (Last Rx:12Czc6571)  Requested for: 53Bak6804 Ordered   15  Potassium Chloride ER 10 MEQ Oral Tablet Extended Release; one daily  or as advised; Therapy: 44RTH8622 to ([phone])  Requested for: 18BHG6597; Last    Rx:20Sep2017 Ordered   16   PredniSONE 20 MG Oral Tablet; TAKE 2 TABLETS DAILY WITH FOOD AS DIRECTED; Therapy: 41CHY8994 to (Evaluate:40Eyp6980)  Requested for: 95YUV6385; Last    Rx:61Wyf1061 Ordered   17  SLPG Magic Mouthwash 1:1:1 maalox/diphenhydramine/lidocaine; 15 ml PO q6 prn; Therapy: 57KKB6880 to (Evaluate:83Zve5172); Last Rx:06Hmh0653 Ordered   18  TraMADol HCl - 50 MG Oral Tablet; TAKE 1 TABLET EVERY 6 HOURS AS NEEDED FOR    ONGOING PAIN;    Therapy: 48FJW6160 to (Last Rx:84Xcj8755) Ordered    Allergies    1   Heparin    Signatures   Electronically signed by : Eduard Odonnell RN; Oct 10 2017  3:24PM EST                       (Author)

## 2018-01-17 NOTE — MISCELLANEOUS
Message   Recorded as Task   Date: 02/11/2016 02:09 PM, Created By: Lisa Wei   Task Name: Call Back   Assigned To: Dia Shanon   Regarding Patient: Marcelo Rao, Status: Active   CommentArleta Im - 11 Feb 2016 2:09 PM     TASK CREATED  Caller: Brynn Gandhiregina; Other; [phone]  (Work)  CALL FROM  BILLING OFFICE - THE CODES AND INFORMATION YOU SENT WERE ALREADY DENIED FOR SERVICE  NEEDS A DIFFERENT CODE/DIAG FOR CLAIM TO BE PROCESSED  PLEASE CALL, TXS   Spoke with Hurst Cousin and informed codes are appropriate for tests and have not been denied in the past  She will review and d/w her manager  Active Problems    1  Abdominal pain (789 00) (R10 9)   2  Bronchitis (490) (J40)   3  Chronic lymphocytic leukemia (204 10) (C91 10)   4  Coronary artery disease (414 00) (I25 10)   5  Dyslipidemia (272 4) (E78 5)   6  Dysphagia (787 20) (R13 10)   7  Esophagitis, reflux (530 11) (K21 0)   8  Heart disease (429 9) (I51 9)   9  Hemolytic anemia (283 9) (D58 9)   10  Leukopenia (288 50) (D72 819)   11  Muscle Cramps (729 82)   12  Neutropenia (288 00) (D70 9)   13  Thrombocytopenia (287 5) (D69 6)    Current Meds   1  Aspirin 325 MG CAPS; Therapy: (Recorded:07Mar2014) to Recorded   2  Carafate 1 GM/10ML Oral Suspension; TAKE 10 ML 4 TIMES DAILY; Therapy: 09AQQ3909 to [phone])  Requested for: 50Old1265; Last   Rx:99Jqm6817 Ordered   3  Carafate 1 GM/10ML Oral Suspension; TAKE 10 ML 4 TIMES DAILY; Therapy: 84Vcp3116 to (Reji Ambrosio)  Requested for: 94Mfd7248; Last   Rx:60Vtj5703 Ordered   4  Citalopram Hydrobromide 40 MG Oral Tablet; Therapy: (Recorded:02Jan2013) to Recorded   5  Fenofibrate Micronized 67 MG Oral Capsule; Therapy: (Recorded:02Jan2013) to Recorded   6  Folic Acid 1 MG Oral Tablet; Therapy: (Recorded:23Xbd7782) to Recorded   7  Gabapentin 100 MG Oral Capsule; TAKE 1 CAPSULE 3 TIMES DAILY; Therapy: 10EHQ3098 to (Shani Donovan)  Requested for: 15Rih5569;  Last Rx:41Qqt9991 Ordered   8  Levofloxacin 500 MG Oral Tablet (Levaquin); TAKE 1 TABLET DAILY AS DIRECTED; Therapy: 76QFP3852 to (Evaluate:47Qri9811)  Requested for: 28Jun2015; Last   WQ:37SZX6719 Ordered   9  Metoprolol Tartrate 50 MG Oral Tablet; Therapy: 28ETN0732 to (Evaluate:23Tml0515) Recorded   10  Pantoprazole Sodium 40 MG Oral Tablet Delayed Release (Protonix); TAKE 1 TABLET    TWICE A DAY; Therapy: 22PHW8902 to (Last Rx:05Jzm2714)  Requested for: 12Aug2015 Ordered   11  Plavix 75 MG Oral Tablet (Clopidogrel Bisulfate); Therapy: (Recorded:76Lur9850) to Recorded   12  PredniSONE 5 MG Oral Tablet; 1 by mouth daily or as directed  Take with food     Requested for: 33MZC7607; Last Rx:04Jan2016 Ordered   13  Ranexa 500 MG Oral Tablet Extended Release 12 Hour; Therapy: (Recorded:02Jan2013) to Recorded   14  Ranitidine HCl - 300 MG Oral Tablet; TAKE 1 TABLET DAILY AT DINNER; Therapy: 20Xtu4045 to (Steve Espinal)  Requested for: 12Aug2015; Last    Rx:12Aug2015 Ordered   15  Simvastatin 40 MG Oral Tablet; Therapy: (Recorded:18Apr2013) to Recorded   16  Ventolin  (90 Base) MCG/ACT Inhalation Aerosol Solution; INHALE 1 TO 2    PUFFS EVERY 4 TO 6 HOURS AS NEEDED; Therapy: 64JNY3781 to (Tristan Dunaway)  Requested for: 82AYG5344; Last    Rx:09Oct2013 Ordered   17  Wellbutrin 100 MG Oral Tablet (BuPROPion HCl); Therapy: (Recorded:06Jan2016) to Recorded    Allergies    1  Heparin   2   Macrolides    Signatures   Electronically signed by : Eusebio Gaffney RN; Feb 11 2016  2:49PM EST                       (Author)

## 2018-01-17 NOTE — MISCELLANEOUS
Message   Recorded as Task   Date: 09/18/2017 10:28 AM, Created By: Catrachita Gonzalez   Task Name: Miscellaneous   Assigned To: Sue Thomas   Regarding Patient: Dawn Sy, Status: Active   CommentVirginia Pang - 18 Sep 2017 10:28 AM     TASK CREATED  Caller: [facility]  Critical lab:Glucose 552   Informed Dr Briseyda Carney  Called pt and informed as well and need to go to ER  PAC called with update  Active Problems    1  Abdominal pain (789 00) (R10 9)   2  Anemia (285 9) (D64 9)   3  Cellulitis of hand without finger or thumb, right (682 4) (L03 113)   4  Chemotherapy-induced nausea (787 02,E933 1) (R11 0,T45  1X5A)   5  Chronic kidney disease, unspecified CKD stage (585 9) (N18 9)   6  Chronic lymphocytic leukemia (204 10) (C91 10)   7  Chronic maxillary sinusitis (473 0) (J32 0)   8  Coronary artery disease (414 00) (I25 10)   9  Dyslipidemia (272 4) (E78 5)   10  Dysphagia (787 20) (R13 10)   11  Esophagitis, reflux (530 11) (K21 0)   12  Heart disease (429 9) (I51 9)   13  Hemolytic anemia (283 9) (D58 9)   14  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,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)   15  History of hemolytic anemia (V12 3) (Z86 2)   16  Joint pain (719 40) (M25 50)   17  Leukopenia (288 50) (D72 819)   18  Lower extremity edema (782 3) (R60 0)   19  Muscle Cramps (729 82)   20  Neutropenia (288 00) (D70 9)   21  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) (R76 12)   22  Thrombocytopenia (287 5) (D69 6)   23  Ulcer of esophagus without bleeding (530 20) (K22 10)    Current Meds   1  Aspirin 81 MG Oral Tablet Delayed Release; Therapy: 51Ygr6502 to Recorded   2  Benzonatate 200 MG Oral Capsule; TAKE 1 CAPSULE 3 TIMES DAILY AS NEEDED; Therapy: 81AZP8430 to (Evaluate:07Oct2017)  Requested for: 07Sep2017; Last   Rx:18Hdp8701 Ordered   3  Citalopram Hydrobromide 40 MG Oral Tablet; Therapy: 16Uym1489 to Recorded   4   Clotrimazole 10 MG Mouth/Throat Cindy; ALLOW 1 CINDY TO SLOWLY DISSOLVE   IN MOUTH  4 TIMES A DAY; Therapy: 43TGO6709 to (Evaluate:65Fov9819)  Requested for: 01AGP5345; Last   Rx:15Mar2017 Ordered   5  Doxycycline Hyclate 100 MG Oral Tablet; TAKE 1 TABLET EVERY 12 HOURS DAILY; Therapy: 14IIG6534 to (Evaluate:08Njg0749)  Requested for: 44Ibe1921; Last   Rx:22Mvm6473 Ordered   6  Fenofibrate 50 MG Oral Capsule; TAKE 1 CAPSULE DAILY WITH A MEAL; Therapy: (Recorded:05Apr2017) to Recorded   7  Folic Acid 1 MG Oral Tablet; Therapy: (Recorded:07Mar2014) to Recorded   8  Gabapentin 100 MG Oral Capsule; TAKE 1 CAPSULE 3 TIMES DAILY; Therapy: 90DPZ6668 to (Song Olson)  Requested for: 67Sfr2674; Last   Rx:47Dxz0209 Ordered   9  Gazyva 1000 MG/40ML Intravenous Solution; Therapy: 95Ssn0175 to Recorded   10  GNP Potassium Gluconate 595 (99 K) MG Oral Tablet; Take 1 tablet daily Recorded   11  Imbruvica 140 MG Oral Capsule; Therapy: 57Qld8284 to Recorded   12  Ondansetron 8 MG Oral Tablet Disintegrating; 1 tab PO Q 8 hr PRN nausea; Therapy: 68SRL6883 to (Last Rx:15Mar2017)  Requested for: 70BXT6430 Ordered   13  Pantoprazole Sodium 40 MG Oral Tablet Delayed Release (Protonix); TAKE 1 TABLET    TWICE A DAY; Therapy: 39EIZ3583 to (Last Rx:39Tqr7547)  Requested for: 06Yhq2252 Ordered   14  PredniSONE 20 MG Oral Tablet; TAKE 2 TABLETS DAILY WITH FOOD AS DIRECTED; Therapy: 42PVD4195 to (Evaluate:41Fqm0199)  Requested for: 42GWR6482; Last    Rx:84Vgv1652 Ordered   15  TraMADol HCl - 50 MG Oral Tablet; TAKE 1 TABLET EVERY 6 HOURS AS NEEDED FOR    ONGOING PAIN;    Therapy: 06XAQ4149 to (Last Rx:35Nbd8349) Ordered    Allergies    1   Heparin    Signatures   Electronically signed by : Ashlie Holliday RN; Sep 18 2017 10:35AM EST                       (Author)

## 2018-01-17 NOTE — MISCELLANEOUS
Message  Patient called as an inpatient at [facility] for virus and needs heavy antibiotics for 3 days  He called to have me ask Dr Carri Steele if he should be isolated from a Leukemia point of view? Dr Carri Steele said no he does not need isolation      Active Problems    1  Abdominal pain (789 00) (R10 9)   2  Bronchitis (490) (J40)   3  Chronic lymphocytic leukemia (204 10) (C91 10)   4  Chronic maxillary sinusitis (473 0) (J32 0)   5  Coronary artery disease (414 00) (I25 10)   6  Dyslipidemia (272 4) (E78 5)   7  Dysphagia (787 20) (R13 10)   8  Esophagitis, reflux (530 11) (K21 0)   9  Heart disease (429 9) (I51 9)   10  Hemolytic anemia (283 9) (D58 9)   11  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,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)   12  Leukopenia (288 50) (D72 819)   13  Muscle Cramps (729 82)   14  Neutropenia (288 00) (D70 9)   15  Thrombocytopenia (287 5) (D69 6)    Current Meds   1  Carafate 1 GM/10ML Oral Suspension; TAKE 10 ML 4 TIMES DAILY; Therapy: 66DPO7074 to (Katherine Leal)  Requested for: 08Sep2015; Last   Rx:04Ysj3394 Ordered   2  Clopidogrel Bisulfate 75 MG Oral Tablet; Take 1 tablet daily Recorded   3  Fenofibrate Micronized 67 MG Oral Capsule; Therapy: (Recorded:02Jan2013) to Recorded   4  Folic Acid 1 MG Oral Tablet; Therapy: (Recorded:07Mar2014) to Recorded   5  Gabapentin 100 MG Oral Capsule; TAKE 1 CAPSULE 3 TIMES DAILY; Therapy: 23PEV4619 to (Basim Kalata)  Requested for: 65Vqg2024; Last   Rx:15Tew3674 Ordered   6  GNP Potassium Gluconate 595 (99 K) MG Oral Tablet; Take 1 tablet daily Recorded   7  Lisinopril 20 MG Oral Tablet; Take 1 tablet daily Recorded   8  Metoprolol Tartrate 50 MG Oral Tablet; Therapy: 07KDX0092 to (Evaluate:58Nkm7395) Recorded   9  Pantoprazole Sodium 40 MG Oral Tablet Delayed Release (Protonix); TAKE 1 TABLET   TWICE A DAY; Therapy: 59AHJ2841 to (Last Rx:48Ugp7032)  Requested for: 17Ked8074 Ordered   10  Plavix 75 MG Oral Tablet (Clopidogrel Bisulfate);     Therapy: (Recorded:73Ywd6252) to Recorded   11  PredniSONE 10 MG Oral Tablet; one daily with food; Therapy: 53JLX1399 to (Sanjuanita Pickard)  Requested for: 94VMX8586; Last    Rx:30Nov2016 Ordered   12  PredniSONE 5 MG Oral Tablet; 1 by mouth daily or as directed  Take with food     Requested for: 94FYN6184; Last Rx:23Ugw0443 Ordered   13  Ranexa 500 MG Oral Tablet Extended Release 12 Hour; Therapy: (Recorded:02Jan2013) to Recorded   14  Simvastatin 40 MG Oral Tablet; Therapy: (Recorded:18Apr2013) to Recorded   15  ValACYclovir HCl - 1 GM Oral Tablet; Therapy: (Recorded:30Nov2016) to Recorded   16  Ventolin  (90 Base) MCG/ACT Inhalation Aerosol Solution; INHALE 1 TO 2    PUFFS EVERY 4 TO 6 HOURS AS NEEDED; Therapy: 74SMR5950 to (Max Silvestre)  Requested for: 63AMP3806; Last    Rx:43Dua0360 Ordered    Allergies    1  Heparin   2   Macrolides    Signatures   Electronically signed by : Suzanne Lindsey, ; Jan 18 2017  9:32AM EST                       (Author)

## 2018-01-17 NOTE — PROGRESS NOTES
Plan    1  DSMT/MNT Time Record; Status:Complete;   Done: 29PPI6238 02:43PM    Discussion/Summary    PATIENT EDUCATION RECORD   Indication for Services: steroid induced diabetes  He is ready to learn  He has no barriers to learning  Diabetes Disease Process:   He understands the pathophysiology of diabetes: Method: Instruction  Response: Verbalizes Understanding   Discussed patient's type of diabetes: Method: Instruction  Response: Verbalizes Understanding   Discussed diagnosis criteria: Method: Instruction  Response: Verbalizes Understanding   Discussed treatment goals: Method: Instruction  Response: Verbalizes Understanding   Discussed benefits of control: Method: Instruction  Response: Verbalizes Understanding   Healthy Eating:   Discussed general nutrition topics: Method: Instruction  Response: Verbalizes Understanding   His blood glucose targets are: Pre-meal target , Post-meal target < 180 and HS target 100-140  Monitoring:   Discussed target blood glucose ranges: Method: Instruction and Handout  Response: Verbalizes Understanding  Discussed Finger stick testing: Method: Instruction  Response: Verbalizes Understanding  Discussed proper stick techniques: Method: Instruction  Response: Verbalizes Understanding  Discussed testing times: Method: Instruction  Response: Verbalizes Understanding  Discussed safe lancet disposal: Method: Instruction  Response: Verbalizes Understanding  Discussed options for record keeping: Method: Instruction  Response: Verbalizes Understanding  Discussed reporting of readings to M D : Method: Instruction  Response: Verbalizes Understanding  He is currently using a FreeStyle Lite meter  Taking Medication:   Discussed site selection and rotation of injections  Method: Instruction  Response: [employer]  Discussed safe needle disposal  Method: Instruction  Response: [employer]  Discussed medication storage  Method: Instruction   Response: Verbalizes Instruction  He is taking   He is taking insulin Humalog and lantus  Discussed onset, peak, and duration of insulin  Method: Instruction  Response: [facility]  Discussed side effects and precautions of insulin  Method: Instruction  Response: [employer]  He was given Fast 15 List   Problem Solving: He is on medications that cause hypoglycemia, He is able to state the symptoms, prevention, and treatment of hypoglycemia   Hypoglycemia: Stated definition and causes: Method: Instruction  Response: Verbalizes Understanding   Described signs and symptoms: Method: Instruction and Handout  Response: Verbalizes UnderstandingResponse: Verbalizes Instruction   Discussed treatment: Method: Instruction  Response: [employer] when to notify physician and when to call EMS: Method: Instruction  Response: Verbalizes Instruction   Hyperglycemia: Stated definition and causes: Method: Instruction  Response: Verbalizes Understanding   Described signs and symptoms: Method: Instruction and Handout  Response: Verbalizes Instruction He was given the following educational materials: Know Your Blood Glucose Numbers, The 15/15 Rule for Treating Low Blood Sugar and Hyperglycemia/Hypoglycemia   Chief Complaint  Patient with steroid induced diabetes seen for class assessment per MD request       History of Present Illness  Patient reports taking 10 units of Humalog with meals and 10 units of Lantus before bed  He states, "90% of the time I take the 10 units of insulin 10 minutes before the meal  10% of the time I take it after the meal"  Encouraged patient to take the insulin 10 minutes before meals for best results  He is taking his insulin via vial and syringe  Patient is using a Freestyle LIte meter and reports noticing BG trending high before lunch  He questioned if that was happening secondary to taking the prednisone in the morning   He was encouraged to discuss this with his MD  Patient reports a 40 pound weight loss during his hospitalization in the spring  He states, "I just couldn't eat"  Piter Pugh could not commit to scheduling class at this time due to doctors visits and chemotherapy  He will call the office to schedule classes when he is better able to take them  Educator will remain available for further questions/concerns  Results/Data  DSMT/MNT Time Record 09PFP3323 02:43PM Reynoldla Skyler     Test Name Result Flag Reference   Date of Service 10/23/2017     Start - Stop Time 1:05-2:00PM     Total MInutes 55 minutes     Group Or Individual Instruction DSMT-I       Future Appointments    Date/Time Provider Specialty Site   01/18/2018 01:00 PM TYSHAWN Reyes   Endocrinology [facility]   11/15/2017 08:00 AM Giselle Mejia MD H[facility]ology [facility]     Signatures   Electronically signed by : Cehry Stubbs [facility]; Oct 23 2017  3:15PM EST                       (Author)    Electronically signed by : TYSHAWN Longo ; Oct 23 2017  4:28PM EST

## 2018-01-17 NOTE — MISCELLANEOUS
Message   Recorded as Task   Date: 03/06/2017 12:54 PM, Created By: Spike Byrd   Task Name: Care Coordination   Assigned To: Maren Mccarthy   Regarding Patient: Last Michaels, Status: Active   Comment:    Spike Byrd - 06 Mar 2017 12:54 PM     TASK CREATED  Caller: Self; Care Coordination; [phone] x,,,,, (Work)  CRISTIAN / DR BACON PT, IS HEMOLIZING RAPIDLY AND HAVING DIFFICULTY BREATHING  PLEASE RETURN CALL  Spoke with patient today - offered patient to go to the ER for quicker evaluation of SOB and possible transfusion  He wishes to wait and have labs drawn today and [place] and transfusion arranged as out patient tomorrow  Patient will call with any additional questions or concerns      Active Problems    1  Abdominal pain (789 00) (R10 9)   2  Bronchitis (490) (J40)   3  Chronic lymphocytic leukemia (204 10) (C91 10)   4  Chronic maxillary sinusitis (473 0) (J32 0)   5  Coronary artery disease (414 00) (I25 10)   6  Dyslipidemia (272 4) (E78 5)   7  Dysphagia (787 20) (R13 10)   8  Esophagitis, reflux (530 11) (K21 0)   9  Heart disease (429 9) (I51 9)   10  Hemolytic anemia (283 9) (D58 9)   11  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,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)   12  Leukopenia (288 50) (D72 819)   13  Muscle Cramps (729 82)   14  Neutropenia (288 00) (D70 9)   15  Thrombocytopenia (287 5) (D69 6)    Current Meds   1  Acyclovir TABS; 600mg twice a day; Therapy: (Recorded:87Gvv4231) to Recorded   2  Allopurinol 100 MG Oral Tablet; 2 daily  Stop if rash; Therapy: 32TMR7781 to (Last Rx:26Nyx9640)  Requested for: 13Wka6365 Ordered   3  Carafate 1 GM/10ML Oral Suspension; TAKE 10 ML 4 TIMES DAILY; Therapy: 17MXK2792 to [phone]  Requested for: 57Orc0804; Last   Rx:08Umb9899 Ordered   4  Clopidogrel Bisulfate 75 MG Oral Tablet; Take 1 tablet daily Recorded   5  Fenofibrate Micronized 67 MG Oral Capsule; Therapy: (Recorded:02Jan2013) to Recorded   6  Folic Acid 1 MG Oral Tablet;    Therapy: (Recorded:07Mar2014) to Recorded   7  Gabapentin 100 MG Oral Capsule; TAKE 1 CAPSULE 3 TIMES DAILY; Therapy: 02BAW1973 to (Flex Yriscorrina)  Requested for: 31Aiu8697; Last   Rx:04Aug2015 Ordered   8  GNP Potassium Gluconate 595 (99 K) MG Oral Tablet; Take 1 tablet daily Recorded   9  Lisinopril 20 MG Oral Tablet; Take 1 tablet daily Recorded   10  Metoprolol Tartrate 50 MG Oral Tablet; Therapy: 92BUE9408 to (Evaluate:29Typ9059) Recorded   11  Pantoprazole Sodium 40 MG Oral Tablet Delayed Release (Protonix); TAKE 1 TABLET    TWICE A DAY; Therapy: 03MJH1861 to (Last Rx:18Ral2599)  Requested for: 57Kxl9952 Ordered   12  Plavix 75 MG Oral Tablet (Clopidogrel Bisulfate); Therapy: (Recorded:03Fby8038) to Recorded   13  PredniSONE 10 MG Oral Tablet; one daily with food; Therapy: 80SDM6601 to (Brianne Coronel)  Requested for: 85RIW7763; Last    Rx:30Nov2016 Ordered   14  PredniSONE 5 MG Oral Tablet; 1 by mouth daily or as directed  Take with food     Requested for: 95WHA4347; Last Rx:40Uru8341 Ordered   15  Ranexa 500 MG Oral Tablet Extended Release 12 Hour; Therapy: (Recorded:02Jan2013) to Recorded   16  Simvastatin 40 MG Oral Tablet; Therapy: (Recorded:18Apr2013) to Recorded   17  ValACYclovir HCl - 1 GM Oral Tablet; Therapy: (Recorded:30Nov2016) to Recorded   18  Ventolin  (90 Base) MCG/ACT Inhalation Aerosol Solution; INHALE 1 TO 2    PUFFS EVERY 4 TO 6 HOURS AS NEEDED; Therapy: 74DOP9099 to (Maryruth Pallas)  Requested for: 04QGK9782; Last    Rx:09Oct2013 Ordered    Allergies    1  Heparin   2  Macrolides    Plan  Hemolytic anemia    · (1) COMPREHENSIVE METABOLIC PANEL; Status:Active - Retrospective By Protocol  Authorization; Requested for:06Mar2017;    · (1) LD (LDH); Status:Active - Retrospective By Protocol Authorization; Requested  for:06Mar2017;    · (1) RETICULOCYTE COUNT; Status:Active - Retrospective By Protocol Authorization;   Requested for:06Mar2017;    · (1) URIC ACID; Status:Active - Retrospective By Protocol Authorization;  Requested  XVQ:40ZFY2677;     Signatures   Electronically signed by : Debbie Beal, ; Mar  6 2017  2:22PM EST                       (Author)

## 2018-01-18 ENCOUNTER — GENERIC CONVERSION - ENCOUNTER (OUTPATIENT)
Dept: OTHER | Facility: OTHER | Age: 63
End: 2018-01-18

## 2018-01-18 NOTE — RESULT NOTES
Discussion/Summary   please let pt know his esophagus biopsies just showed inflammation but the herpes is gone, good news; he should continue acid blocking medication for symptom control     Verified Results  (1) TISSUE EXAM [id number] 03:38PM Veronica Chanel     Test Name Result Flag Reference   LAB AP CASE REPORT (Report)     Surgical Pathology Report             Case: [id number]                   Authorizing Provider: Asia Richard DO    Collected:      10/05/2017 1538        Ordering Location:   [facility]    Received:      10/05/2017 [address] Endoscopy                              Pathologist:      Prosper Boss MD                             Specimen:  Esophagus, distal esophagus bx   LAB AP FINAL DIAGNOSIS      A  Distal esophagus, biopsy:    - Abundant inflammatory exudate with numerous neutrophils, histiocytes   and few eosinophils and admixed few      bacteria  - See note  Electronically signed by Prosper Boss MD on 10/13/2017 at 12:26 PM   LAB AP NOTE      Fungal stains and immunostains for viral organisms are in progress  An   addendum report will follow  Interpretation performed at , [address]   LAB AP SURGICAL ADDITIONAL INFORMATION (Report)     All controls performed with the immunohistochemical stains reported above   reacted appropriately  These tests were developed and their performance   characteristics determined by [facility] or   [address] [place] [address]  They may not be cleared or approved by the [place]  Food and Drug Administration  The FDA has determined that such clearance   or approval is not necessary  These tests are used for clinical purposes  They should not be regarded as investigational or for research  This   laboratory has been approved by [place], designated as a high-complexity   laboratory and is qualified to perform these tests     LAB AP GROSS DESCRIPTION (Report)     A  The specimen is received in formalin, labeled with the patient's name   and hospital number, and is designated distal esophagus biopsy, are   multiple irregularly shaped fragments of tan soft tissue measuring 0 5 x   0 5 x 0 1 cm in aggregate  Entirely submitted  One cassette  Note: The estimated total formalin fixation time based upon information   provided by the submitting clinician and the standard processing schedule   is less than 72 hours  RLR   LAB AP ADDENDUM 1 (Report)     GMS and PAS stains for fungal organisms are negative (appropriate controls   performed)  Imunostains for CMV and HSV 1/2 are negative (appropriate positive   controls performed)    Addendum electronically signed by Ahmet Beth MD on 10/16/2017 at 3:39 PM

## 2018-01-18 NOTE — MISCELLANEOUS
Message   Recorded as Task   Date: 05/11/2017 09:58 AM, Created By: GABRIEL SCHROEDER   Task Name: Call Back   Assigned To: Sue Thomas   Regarding Patient: Angelique Daniels, Status: Active   Comment:    Michelle Anaya - 11 May 2017 9:58 AM     TASK CREATED  Pt called and would like to know if his Venclexta needed to be adjusted  #: [phone]   Alycia Perdomo - 11 May 2017 10:03 AM     TASK REASSIGNED: Previously Assigned To Migdalia Hall - 11 May 2017 10:24 AM     TASK REPLIED TO: Previously Assigned To Lyndsay Diaz  gave instructions  note in chart   Noted  Active Problems    1  Abdominal pain (789 00) (R10 9)   2  Bronchitis (490) (J40)   3  Chemotherapy-induced nausea (787 02,E933 1) (R11 0,T45  1X5A)   4  Chronic lymphocytic leukemia (204 10) (C91 10)   5  Chronic maxillary sinusitis (473 0) (J32 0)   6  Coronary artery disease (414 00) (I25 10)   7  Dyslipidemia (272 4) (E78 5)   8  Dysphagia (787 20) (R13 10)   9  Esophagitis, reflux (530 11) (K21 0)   10  Heart disease (429 9) (I51 9)   11  Hemolytic anemia (283 9) (D58 9)   12  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,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)   13  Leukopenia (288 50) (D72 819)   14  Lower extremity edema (782 3) (R60 0)   15  Muscle Cramps (729 82)   16  Neutropenia (288 00) (D70 9)   17  Thrombocytopenia (287 5) (D69 6)    Current Meds   1  Acyclovir 400 MG Oral Tablet; 1 tab po bid; Therapy: 33FGA2947 to (Last Rx:15Mar2017)  Requested for: 16WPJ1110 Ordered   2  Acyclovir TABS; 600mg twice a day; Therapy: (Recorded:60Ubq6887) to Recorded   3  Allopurinol 100 MG Oral Tablet; 2 daily  Stop if rash; Therapy: 12SZX2421 to (Last Rx:22Feb2017)  Requested for: 10Dhc3305 Ordered   4  Clopidogrel Bisulfate 75 MG Oral Tablet; Take 1 tablet daily Recorded   5  Clotrimazole 10 MG Mouth/Throat Cindy; ALLOW 1 CINDY TO SLOWLY DISSOLVE   IN MOUTH  4 TIMES A DAY;    Therapy: 77OBQ7650 to (Evaluate:29Cuv3774)  Requested for: 32HUT8883; Last Rx:15Mar2017 Ordered   6  Fenofibrate 50 MG Oral Capsule; TAKE 1 CAPSULE DAILY WITH A MEAL; Therapy: (Recorded:05Apr2017) to Recorded   7  Folic Acid 1 MG Oral Tablet; Therapy: (Recorded:07Mar2014) to Recorded   8  Gabapentin 100 MG Oral Capsule; TAKE 1 CAPSULE 3 TIMES DAILY; Therapy: 66TFA1252 to (Billie Man)  Requested for: 83Eal9655; Last   Rx:60Npz0033 Ordered   9  GNP Potassium Gluconate 595 (99 K) MG Oral Tablet; Take 1 tablet daily Recorded   10  LevoFLOXacin 750 MG Oral Tablet; TAKE AS DIRECTED; Therapy: (Recorded:05Apr2017) to Recorded   11  Lexapro 10 MG Oral Tablet (Escitalopram Oxalate); TAKE 1 TABLET DAILY; Therapy: (Recorded:05Apr2017) to Recorded   12  Lisinopril 20 MG Oral Tablet; Take 1 tablet daily Recorded   13  Metoprolol Tartrate 50 MG Oral Tablet; Therapy: 11NHT3335 to (Evaluate:07Mxn7167) Recorded   14  Ondansetron 8 MG Oral Tablet Dispersible; 1 tab PO Q 8 hr PRN nausea; Therapy: 49DJT2585 to (Last Rx:15Mar2017)  Requested for: 43RJK5897 Ordered   15  Pantoprazole Sodium 40 MG Oral Tablet Delayed Release (Protonix); TAKE 1 TABLET    TWICE A DAY; Therapy: 07LFW1529 to (Last Rx:61Qdo2887)  Requested for: 41Osc3446 Ordered   16  Plavix 75 MG Oral Tablet (Clopidogrel Bisulfate); Therapy: (Recorded:59Vck7538) to Recorded   17  PredniSONE 10 MG Oral Tablet; one daily with food; Therapy: 81DDN7029 to (Luna Webbs)  Requested for: 49KJH6838; Last    Rx:30Nov2016 Ordered   18  PredniSONE 5 MG Oral Tablet; 1 by mouth daily or as directed  Take with food     Requested for: 19KRE5370; Last Rx:24Phk9408 Ordered   19  Ranexa 500 MG Oral Tablet Extended Release 12 Hour; Therapy: (Recorded:02Jan2013) to Recorded   20  ValACYclovir HCl - 1 GM Oral Tablet; Therapy: (Recorded:30Nov2016) to Recorded   21  Venclexta 10 MG Oral Tablet; 6  TABS PO DAILY X 1 WEEK THEN 8 TABS PO DAILY X 1    WEEK THEN 10 TABS PO DAILY X 1 WEEK THEN 12 TABS WEEKLY X 1 WEEK;     Therapy: 95KLS2661 to  Requested for: 61FYD1411 Recorded   22  Ventolin  (90 Base) MCG/ACT Inhalation Aerosol Solution; INHALE 1 TO 2    PUFFS EVERY 4 TO 6 HOURS AS NEEDED; Therapy: 76KTA8989 to (Ernesto Lee)  Requested for: 12VDJ5714; Last    Rx:45Yyy1752 Ordered    Allergies    1  Heparin   2   Macrolides    Signatures   Electronically signed by : Ashley Watkins RN; May 11 2017 11:13AM EST                       (Author)

## 2018-01-18 NOTE — MISCELLANEOUS
Message   Recorded as Task   Date: 10/04/2017 08:49 AM, Created By: Holly Baron   Task Name: Call Back   Assigned To: Judith Holland   Regarding Patient: Dawn Sy, Status: Active   Comment:    Cindy Dyer - 04 Oct 2017 8:49 AM     TASK CREATED  CALL FROM PT    HE HAS BEEN OFF HIS PILLS FOR A WHILE AND IS AFRAID HIS COUNTS WILL DROP TO MUCH    DO YOU THINK  [address] WANTS HIM TO HAVE LABS DONE THIS WEEK   MAYBE [address]   [phone]   Spoke with pt and scheduled for CBCD on Friday  Active Problems    1  Abdominal pain (789 00) (R10 9)   2  Anemia (285 9) (D64 9)   3  Cellulitis of hand without finger or thumb, right (682 4) (L03 113)   4  Chemotherapy-induced nausea (787 02,E933 1) (R11 0,T45  1X5A)   5  Chronic kidney disease, unspecified CKD stage (585 9) (N18 9)   6  Chronic lymphocytic leukemia (204 10) (C91 10)   7  Chronic maxillary sinusitis (473 0) (J32 0)   8  Coronary artery disease (414 00) (I25 10)   9  Dyslipidemia (272 4) (E78 5)   10  Dysphagia (787 20) (R13 10)   11  Esophagitis, reflux (530 11) (K21 0)   12  Heart disease (429 9) (I51 9)   13  Hemolytic anemia (283 9) (D58 9)   14  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,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)   15  History of hemolytic anemia (V12 3) (Z86 2)   16  Hypokalemia (276 8) (E87 6)   17  Joint pain (719 40) (M25 50)   18  Leukopenia (288 50) (D72 819)   19  Lower extremity edema (782 3) (R60 0)   20  Muscle Cramps (729 82)   21  Neutropenia (288 00) (D70 9)   22  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) (R76 12)   23  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,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,T38 0X5A)   24  Thrombocytopenia (287 5) (D69 6)   25  Ulcer of esophagus without bleeding (530 20) (K22 10)    Current Meds   1  Aspirin 81 MG Oral Tablet Delayed Release; Therapy: 30Tag1450 to Recorded   2  Benzonatate 200 MG Oral Capsule; TAKE 1 CAPSULE 3 TIMES DAILY AS NEEDED;    Therapy: 17VQH1019 to (03 17 74 30 53)  Requested for: 07Sep2017; Last   Rx:07Sep2017 Ordered   3  Citalopram Hydrobromide 40 MG Oral Tablet; Therapy: 87Hjf7347 to Recorded   4  Clotrimazole 10 MG Mouth/Throat Cindy; ALLOW 1 CINDY TO SLOWLY DISSOLVE   IN MOUTH  4 TIMES A DAY; Therapy: 80SPI2349 to (Evaluate:40Ipr3659)  Requested for: 36SOP9708; Last   Rx:15Mar2017 Ordered   5  Doxycycline Hyclate 100 MG Oral Tablet; TAKE 1 TABLET EVERY 12 HOURS DAILY; Therapy: 40YGF6135 to (Evaluate:13Yhy4832)  Requested for: 11Ivr3156; Last   Rx:05Sep2017 Ordered   6  Fenofibrate 50 MG Oral Capsule; TAKE 1 CAPSULE DAILY WITH A MEAL; Therapy: (Recorded:05Apr2017) to Recorded   7  Folic Acid 1 MG Oral Tablet; Therapy: (Recorded:07Mar2014) to Recorded   8  Gabapentin 100 MG Oral Capsule; TAKE 1 CAPSULE 3 TIMES DAILY; Therapy: 65HCA4717 to (Noah Hall)  Requested for: 18Xcv3704; Last   Rx:52Wzp8334 Ordered   9  Gazyva 1000 MG/40ML Intravenous Solution; Therapy: 09Jlu5977 to Recorded   10  GNP Potassium Gluconate 595 (99 K) MG Oral Tablet; Take 1 tablet daily Recorded   11  Imbruvica 140 MG Oral Capsule; TAKE 2 CAPSULE Daily; Therapy: 05Ixt5585 to (Evaluate:50Png6518)  Requested for: 44Gjl2543; Last    Rx:81Toy3320 Ordered   12  Ondansetron 8 MG Oral Tablet Disintegrating; 1 tab PO Q 8 hr PRN nausea; Therapy: 27RIU4647 to (Last Rx:15Mar2017)  Requested for: 39ZCS6325 Ordered   13  Pantoprazole Sodium 40 MG Oral Tablet Delayed Release (Protonix); TAKE 1 TABLET    TWICE A DAY; Therapy: 05BID0081 to (Last Rx:80Qee0372)  Requested for: 78Zbd9314 Ordered   14  Potassium Chloride ER 10 MEQ Oral Tablet Extended Release; one daily  or as advised; Therapy: 38XVS1661 to ([phone])  Requested for: 03CMI1265; Last    Rx:87Hrp2239 Ordered   15  PredniSONE 20 MG Oral Tablet; TAKE 2 TABLETS DAILY WITH FOOD AS DIRECTED; Therapy: 15FXI1396 to (Evaluate:20Neg9571)  Requested for: 21LBP0271; Last    Rx:75Oig5943 Ordered   16   TraMADol HCl - 50 MG Oral Tablet; TAKE 1 TABLET EVERY 6 HOURS AS NEEDED FOR    ONGOING PAIN;    Therapy: 35IZU6120 to (Last Rx:38Dme0312) Ordered    Allergies    1  Heparin    Plan  Chronic lymphocytic leukemia    · (1) CBC/PLT/DIFF; Status:Active - Retrospective By Protocol Authorization;  Requested  [facility]:51YCZ0081;     Signatures   Electronically signed by : Karl Gonzales RN; Oct  4 2017  9:10AM EST                       (Author)

## 2018-01-22 ENCOUNTER — APPOINTMENT (OUTPATIENT)
Dept: LAB | Facility: CLINIC | Age: 63
End: 2018-01-22
Payer: MEDICARE

## 2018-01-22 ENCOUNTER — GENERIC CONVERSION - ENCOUNTER (OUTPATIENT)
Dept: OTHER | Facility: OTHER | Age: 63
End: 2018-01-22

## 2018-01-22 ENCOUNTER — HOSPITAL ENCOUNTER (OUTPATIENT)
Dept: INFUSION CENTER | Facility: CLINIC | Age: 63
Discharge: HOME | End: 2018-01-22
Payer: MEDICARE

## 2018-01-22 VITALS
OXYGEN SATURATION: 98 % | BODY MASS INDEX: 28.58 KG/M2 | DIASTOLIC BLOOD PRESSURE: 70 MMHG | HEIGHT: 72 IN | TEMPERATURE: 98.8 F | HEART RATE: 79 BPM | SYSTOLIC BLOOD PRESSURE: 122 MMHG | WEIGHT: 211 LBS

## 2018-01-22 VITALS
SYSTOLIC BLOOD PRESSURE: 138 MMHG | TEMPERATURE: 98.9 F | WEIGHT: 207 LBS | BODY MASS INDEX: 28.04 KG/M2 | DIASTOLIC BLOOD PRESSURE: 72 MMHG | RESPIRATION RATE: 16 BRPM | OXYGEN SATURATION: 98 % | HEIGHT: 72 IN | HEART RATE: 76 BPM

## 2018-01-22 VITALS
SYSTOLIC BLOOD PRESSURE: 112 MMHG | WEIGHT: 204.38 LBS | TEMPERATURE: 98.6 F | DIASTOLIC BLOOD PRESSURE: 76 MMHG | BODY MASS INDEX: 27.68 KG/M2 | HEART RATE: 77 BPM | HEIGHT: 72 IN | RESPIRATION RATE: 16 BRPM | OXYGEN SATURATION: 98 %

## 2018-01-22 VITALS
OXYGEN SATURATION: 96 % | TEMPERATURE: 98.6 F | WEIGHT: 218 LBS | BODY MASS INDEX: 29.53 KG/M2 | HEART RATE: 95 BPM | RESPIRATION RATE: 18 BRPM | HEIGHT: 72 IN | DIASTOLIC BLOOD PRESSURE: 78 MMHG | SYSTOLIC BLOOD PRESSURE: 140 MMHG

## 2018-01-22 DIAGNOSIS — E09.9 DRUG OR CHEMICAL INDUCED DIABETES MELLITUS WITHOUT COMPLICATIONS (HCC): ICD-10-CM

## 2018-01-22 LAB
ALBUMIN SERPL BCP-MCNC: 4 G/DL (ref 3.2–5)
ALP SERPL-CCNC: 44 U/L (ref 46–116)
ALT SERPL W P-5'-P-CCNC: 22 U/L (ref 12–78)
ANION GAP SERPL CALCULATED.3IONS-SCNC: 12 MMOL/L (ref 4–13)
ANISOCYTOSIS BLD QL SMEAR: PRESENT
AST SERPL W P-5'-P-CCNC: 27 U/L (ref 5–45)
BASOPHILS # BLD AUTO: 0 THOUSAND/UL (ref 0–0.1)
BASOPHILS NFR MAR MANUAL: 0 % (ref 0–1)
BILIRUB DIRECT SERPL-MCNC: 0.15 MG/DL (ref 0–0.2)
BILIRUB SERPL-MCNC: 0.8 MG/DL (ref 0.2–1)
BUN SERPL-MCNC: 24 MG/DL (ref 5–25)
CALCIUM SERPL-MCNC: 9.6 MG/DL (ref 8.3–10.1)
CHLORIDE SERPL-SCNC: 103 MMOL/L (ref 98–108)
CO2 SERPL-SCNC: 27 MMOL/L (ref 21–32)
CREAT SERPL-MCNC: 1.1 MG/DL (ref 0.6–1.3)
EOSINOPHIL # BLD AUTO: 0 THOUSAND/UL (ref 0–0.61)
EOSINOPHIL NFR BLD MANUAL: 0 % (ref 0–6)
ERYTHROCYTE [DISTWIDTH] IN BLOOD BY AUTOMATED COUNT: 15.3 % (ref 11.6–15.1)
EST. AVERAGE GLUCOSE BLD GHB EST-MCNC: 105 MG/DL
GFR SERPL CREATININE-BSD FRML MDRD: 71 ML/MIN/1.73SQ M
GLUCOSE SERPL-MCNC: 99 MG/DL (ref 65–140)
HBA1C MFR BLD: 5.3 % (ref 4.2–6.3)
HCT VFR BLD AUTO: 43 % (ref 36.5–49.3)
HGB BLD-MCNC: 14.4 G/DL (ref 12–17)
LYMPHOCYTES # BLD AUTO: 0.57 THOUSAND/UL (ref 0.6–4.47)
LYMPHOCYTES # BLD AUTO: 19 % (ref 14–44)
MCH RBC QN AUTO: 28.2 PG (ref 26.8–34.3)
MCHC RBC AUTO-ENTMCNC: 33.5 G/DL (ref 31.4–37.4)
MCV RBC AUTO: 84 FL (ref 82–98)
METAMYELOCYTES NFR BLD MANUAL: 1 % (ref 0–1)
MONOCYTES # BLD AUTO: 0.24 THOUSAND/UL (ref 0–1.22)
MONOCYTES NFR BLD AUTO: 8 % (ref 4–12)
NEUTS BAND NFR BLD MANUAL: 2 % (ref 0–8)
NEUTS SEG # BLD: 2.16 THOUSAND/UL (ref 1.81–6.82)
NEUTS SEG NFR BLD AUTO: 70 % (ref 43–75)
OVALOCYTES BLD QL SMEAR: PRESENT
PLATELET # BLD AUTO: 35 THOUSANDS/UL (ref 149–390)
PLATELET BLD QL SMEAR: ABNORMAL
PMV BLD AUTO: 10.2 FL (ref 8.9–12.7)
POTASSIUM SERPL-SCNC: 3.8 MMOL/L (ref 3.5–5.3)
PROT SERPL-MCNC: 6.1 G/DL (ref 6.4–8.2)
RBC # BLD AUTO: 5.11 MILLION/UL (ref 3.88–5.62)
RETICS # AUTO: NORMAL 10*3/UL (ref 14356–105094)
RETICS # CALC: 1.31 % (ref 0.37–1.87)
SODIUM SERPL-SCNC: 142 MMOL/L (ref 136–145)
TOTAL CELLS COUNTED SPEC: 100
WBC # BLD AUTO: 3 THOUSAND/UL (ref 4.31–10.16)
WBC NRBC COR # BLD: 3 THOUSAND/UL (ref 4.31–10.16)

## 2018-01-22 PROCEDURE — 85045 AUTOMATED RETICULOCYTE COUNT: CPT | Performed by: INTERNAL MEDICINE

## 2018-01-22 PROCEDURE — 85027 COMPLETE CBC AUTOMATED: CPT | Performed by: INTERNAL MEDICINE

## 2018-01-22 PROCEDURE — 82248 BILIRUBIN DIRECT: CPT | Performed by: INTERNAL MEDICINE

## 2018-01-22 PROCEDURE — 80053 COMPREHEN METABOLIC PANEL: CPT | Performed by: INTERNAL MEDICINE

## 2018-01-22 PROCEDURE — 83036 HEMOGLOBIN GLYCOSYLATED A1C: CPT

## 2018-01-22 PROCEDURE — 36415 COLL VENOUS BLD VENIPUNCTURE: CPT

## 2018-01-22 PROCEDURE — 85007 BL SMEAR W/DIFF WBC COUNT: CPT | Performed by: INTERNAL MEDICINE

## 2018-01-22 NOTE — PLAN OF CARE
Problem: Potential for Falls  Goal: Patient will remain free of falls  INTERVENTIONS:  - Assess patient frequently for physical needs  -  Identify cognitive and physical deficits and behaviors that affect risk of falls    -  [place] fall precautions as indicated by assessment   - Educate patient/family on patient safety including physical limitations  - Instruct patient to call for assistance with activity based on assessment  - Modify environment to reduce risk of injury  - Consider OT/PT consult to assist with strengthening/mobility   Outcome: Progressing

## 2018-01-22 NOTE — PROGRESS NOTES
Blood work collected via port a cath  Port flushed per protocol  Pt is aware of all future appointments

## 2018-01-23 VITALS
OXYGEN SATURATION: 99 % | HEIGHT: 72 IN | DIASTOLIC BLOOD PRESSURE: 72 MMHG | TEMPERATURE: 97.2 F | RESPIRATION RATE: 16 BRPM | WEIGHT: 220.25 LBS | SYSTOLIC BLOOD PRESSURE: 134 MMHG | BODY MASS INDEX: 29.83 KG/M2 | HEART RATE: 74 BPM

## 2018-01-23 NOTE — MISCELLANEOUS
Message   Recorded as Task   Date: 12/04/2017 02:57 PM, Created By: Chloé Alvarado   Task Name: Care Coordination   Assigned To: Chloé Alvarado   Regarding Patient: Chidi Raya, Status: In Progress   Yulia Kathleen - 04 Dec 2017 2:57 PM     TASK CREATED  Given Platelets 30 on 75/0/64, Dr Ysesica Anaya recommeded for patient to hold Imbruvica and have CBC with diff  rechecked Thursday 12/7/17  Attempted to call patient but no answer  Call and review information with patient  Alpa Reinoso - 04 Dec 2017 3:00 PM     TASK IN PROGRESS   Placed phone call to patient at home and via cell phone  No answer  Left voicemail reviewing patients Platelets were low via lab results, and Dr Yessica Aanya would like patient to hold his Imbruvica for next few days and have his labs drawn again on Thursday 12/7/17  Dr Yessica Anaya will follow up with him following new lab results 12/7/17  Active Problems    1  Abdominal pain (789 00) (R10 9)   2  Anemia (285 9) (D64 9)   3  Cellulitis of hand without finger or thumb, right (682 4) (L03 113)   4  Cellulitis of head or scalp (682 8) (L03 811)   5  Chemotherapy-induced nausea (787 02,E933 1) (R11 0,T45  1X5A)   6  Chronic kidney disease, unspecified CKD stage (585 9) (N18 9)   7  Chronic lymphocytic leukemia (204 10) (C91 10)   8  Chronic maxillary sinusitis (473 0) (J32 0)   9  Coronary artery disease (414 00) (I25 10)   10  Dyslipidemia (272 4) (E78 5)   11  Dysphagia (787 20) (R13 10)   12  Esophagitis, reflux (530 11) (K21 0)   13  Heart disease (429 9) (I51 9)   14  Hemolytic anemia (283 9) (D58 9)   15  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,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)   16  History of hemolytic anemia (V12 3) (Z86 2)   17  Hyperlipidemia (272 4) (E78 5)   18  Hypokalemia (276 8) (E87 6)   19  Joint pain (719 40) (M25 50)   20  Leukopenia (288 50) (D72 819)   21  Lower extremity edema (782 3) (R60 0)   22  Muscle Cramps (729 82)   23  Neutropenia (288 00) (D70 9)   24  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) (R76 12)   25  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,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,T38 0X5A)   26  Thrombocytopenia (287 5) (D69 6)   27  Ulcer of esophagus without bleeding (530 20) (K22 10)    Current Meds   1  Aspirin 81 MG Oral Tablet Delayed Release; Therapy: 11Sep2017 to Recorded   2  BD Insulin Syringe Ultrafine 31G X 5/16" 1 ML Miscellaneous; use four daily; Therapy: 41DMB3127 to [phone]); Last Rx:26Oct2017 Ordered   3  Benzonatate 200 MG Oral Capsule; TAKE 1 CAPSULE 3 TIMES DAILY AS NEEDED; Therapy: 20OOJ2209 to (Kirk Osuna)  Requested for: 07Sep2017; Last   Rx:07Sep2017 Ordered   4  Carafate 1 GM/10ML Oral Suspension; TAKE 10 ML 4 TIMES DAILY; Therapy: 89ZEK9300 to (Raiza Kirk)  Requested for: 46TYL6513; Last   Rx:05Oct2017 Ordered   5  Citalopram Hydrobromide 40 MG Oral Tablet; Therapy: 11Sep2017 to Recorded   6  Clotrimazole 10 MG Mouth/Throat Cindy; ALLOW 1 CINDY TO SLOWLY DISSOLVE   IN MOUTH  4 TIMES A DAY; Therapy: 06UAH6288 to (Evaluate:96Gpb2822)  Requested for: 39OPO8626; Last   Rx:15Mar2017 Ordered   7  Doxycycline Hyclate 100 MG Oral Tablet; TAKE 1 TABLET DAILY AS DIRECTED; Therapy: 24OFK7289 to (Evaluate:17Nov2017)  Requested for: 01NTD0943; Last   Rx:18Oct2017 Ordered   8  Doxycycline Hyclate 100 MG Oral Tablet; TAKE 1 TABLET EVERY 12 HOURS DAILY; Therapy: 35VEC1744 to (Evaluate:63Urt1013)  Requested for: 64Vth5887; Last   Rx:05Sep2017 Ordered   9  Fenofibrate 50 MG Oral Capsule; TAKE 1 CAPSULE DAILY WITH A MEAL; Therapy: (Recorded:05Apr2017) to Recorded   10  Folic Acid 1 MG Oral Tablet; Therapy: (Recorded:07Mar2014) to Recorded   11  Gabapentin 100 MG Oral Capsule; TAKE 1 CAPSULE 3 TIMES DAILY; Therapy: 77NWA0516 to (Yobany El)  Requested for: 60Nxx3481; Last    Rx:66Xoy3972 Ordered   12  Gazyva 1000 MG/40ML Intravenous Solution; Therapy: 08Ifa0965 to Recorded   13   GNP Potassium Gluconate 595 (99 K) MG Oral Tablet; Take 1 tablet daily Recorded   14  Imbruvica 140 MG Oral Capsule; TAKE 2 CAPSULE Daily; Therapy: 21Typ7464 to (Evaluate:08Jan2018)  Requested for: 13ROQ1059; Last    Rx:10Oct2017 Ordered   15  Keflex 500 MG Oral Capsule (Cephalexin); Therapy: (Recorded:18Oct2017) to Recorded   16  Ondansetron 8 MG Oral Tablet Disintegrating; 1 tab PO Q 8 hr PRN nausea; Therapy: 63BEH6361 to (Last Rx:15Mar2017)  Requested for: 23VQW9794 Ordered   17  Pantoprazole Sodium 40 MG Oral Tablet Delayed Release (Protonix); TAKE 1 TABLET    TWICE A DAY; Therapy: 18LDW5075 to (Last Rx:54Cmu6992)  Requested for: 26Ddy4850 Ordered   18  Percocet 5-325 MG Oral Tablet (Oxycodone-Acetaminophen); Therapy: (Recorded:18Oct2017) to Recorded   19  Potassium Chloride ER 10 MEQ Oral Tablet Extended Release; one daily  or as advised; Therapy: 19ADC6978 to (03 17 74 30 53)  Requested for: 19KXC3808; Last    Rx:19Jlw0030 Ordered   20  PredniSONE 20 MG Oral Tablet; TAKE 2 TABLETS DAILY WITH FOOD AS DIRECTED; Therapy: 06QGL0714 to (Evaluate:62Hil2993)  Requested for: 83QQQ3906; Last    Rx:40Zqg5259 Ordered   21  SLPG Magic Mouthwash 1:1:1 maalox/diphenhydramine/lidocaine; 15 ml PO q6 prn; Therapy: 88LYY7218 to (Evaluate:38Jck0881); Last Rx:05Oct2017 Ordered   22  TraMADol HCl - 50 MG Oral Tablet; TAKE 1 TABLET EVERY 6 HOURS AS NEEDED FOR    ONGOING PAIN;    Therapy: 99QHC6176 to (Last Rx:94Sdu6574) Ordered    Allergies    1   Heparin    Signatures   Electronically signed by : Artemio Ramsey, ; Dec  4 2017  5:00PM EST                       (Author)

## 2018-01-23 NOTE — MISCELLANEOUS
Message   Recorded as Task   Date: 12/07/2017 11:03 AM, Created By: Mandy Ramos   Task Name: Call Back   Assigned To: Sue Thomas   Regarding Patient: Kathleen Worthington, Status: Active   Comment:    Maral Ramos - 07 Dec 2017 11:03 AM     TASK CREATED  Caller: Self; Other; [phone] (Mobile Phone)  OXCONTIN 15MG - COST $324 OR $330  - 10MG $167 26 IN GENERIC  IS 10 MG OK FOR HIM TO USE  HE PAYS OUT OF POCKET  PLEASE CALL TO Jayro Dixon - 07 Dec 2017 11:29 AM     TASK REASSIGNED: Previously Assigned To Javier Alfredo, The pt will get Oxy 10 mg and pay for that  But can someone reachout to him because he does not prescription coverage and could use assistance with meds  Price Benitez - 08 Dec 2017 3:00 PM     TASK EDITED  Do we know where this patient is getting his prescriptions filled? If we could have him use [email or web address] maybe Gulshan or Lorne Campbell would have funds to help offset some of his co-payments  Can you please reach out to them? Spoke with [address] [place] [address] and she will also d/w Shelia Ash then reach out to pt  Active Problems    1  Abdominal pain (789 00) (R10 9)   2  Anemia (285 9) (D64 9)   3  Cellulitis of hand without finger or thumb, right (682 4) (L03 113)   4  Cellulitis of head or scalp (682 8) (L03 811)   5  Chemotherapy-induced nausea (787 02,E933 1) (R11 0,T45  1X5A)   6  Chronic kidney disease, unspecified CKD stage (585 9) (N18 9)   7  Chronic lymphocytic leukemia (204 10) (C91 10)   8  Chronic maxillary sinusitis (473 0) (J32 0)   9  Coronary artery disease (414 00) (I25 10)   10  Dyslipidemia (272 4) (E78 5)   11  Dysphagia (787 20) (R13 10)   12  Esophagitis, reflux (530 11) (K21 0)   13  Heart disease (429 9) (I51 9)   14  Hemolytic anemia (283 9) (D58 9)   15  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,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)   16  History of hemolytic anemia (V12 3) (Z86 2)   17  Hyperlipidemia (272 4) (E78 5)   18  Hypokalemia (276 8) (E87 6)   19   HZV (herpes zoster virus) post 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 19) (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 29)   20  Joint pain (719 40) (M25 50)   21  Leukopenia (288 50) (D72 819)   22  Lower extremity edema (782 3) (R60 0)   23  Muscle Cramps (729 82)   24  Neutropenia (288 00) (D70 9)   25  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) (R76 12)   26  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,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,T38 0X5A)   27  Thrombocytopenia (287 5) (D69 6)   28  Ulcer of esophagus without bleeding (530 20) (K22 10)    Current Meds   1  Aspirin 81 MG Oral Tablet Delayed Release; Therapy: 68Obk1323 to Recorded   2  BD Insulin Syringe Ultrafine 31G X 5/16" 1 ML Miscellaneous; use four daily; Therapy: 41LXM5223 to [phone] Last Rx:26Oct2017 Ordered   3  Benzonatate 200 MG Oral Capsule; TAKE 1 CAPSULE 3 TIMES DAILY AS NEEDED; Therapy: 12VNY3564 to (Sandeep Ortiz)  Requested for: 43Hsm0479; Last   Rx:07Sep2017 Ordered   4  Carafate 1 GM/10ML Oral Suspension; TAKE 10 ML 4 TIMES DAILY; Therapy: 45KHH1288 to (Faiza Richardson)  Requested for: 59QUI5427; Last   Rx:05Oct2017 Ordered   5  Citalopram Hydrobromide 40 MG Oral Tablet; Therapy: 32Xav8837 to Recorded   6  Clotrimazole 10 MG Mouth/Throat Cindy; ALLOW 1 CINDY TO SLOWLY DISSOLVE   IN MOUTH  4 TIMES A DAY; Therapy: 76PRY1626 to (Evaluate:33Udx3192)  Requested for: 08BZI7596; Last   Rx:15Mar2017 Ordered   7  Doxycycline Hyclate 100 MG Oral Tablet; TAKE 1 TABLET DAILY AS DIRECTED; Therapy: 74XGV9996 to (Evaluate:85Xsi0795)  Requested for: 82UPG0098; Last   Rx:18Oct2017 Ordered   8  Doxycycline Hyclate 100 MG Oral Tablet; TAKE 1 TABLET EVERY 12 HOURS DAILY; Therapy: 83XOI0632 to (Evaluate:32Gsr0772)  Requested for: 41Rqb5977; Last   Rx:05Sep2017 Ordered   9  Fenofibrate 50 MG Oral Capsule; TAKE 1 CAPSULE DAILY WITH A MEAL; Therapy: (Recorded:05Apr2017) to Recorded   10  Folic Acid 1 MG Oral Tablet; Therapy: (Recorded:07Mar2014) to Recorded   11   Gabapentin 100 MG Oral Capsule; TAKE 1 CAPSULE 3 TIMES DAILY; Therapy: 85ZRQ2170 to (Ricco Poon)  Requested for: 45Pwp2603; Last    Rx:31Hph9351 Ordered   12  Gazyva 1000 MG/40ML Intravenous Solution; Therapy: 06Yfh5689 to Recorded   13  GNP Potassium Gluconate 595 (99 K) MG Oral Tablet; Take 1 tablet daily Recorded   14  Imbruvica 140 MG Oral Capsule; TAKE 2 CAPSULE Daily; Therapy: 23Wsa0202 to (Evaluate:08Jan2018)  Requested for: 85RYW4432; Last    Rx:29Bxt4535 Ordered   15  Keflex 500 MG Oral Capsule (Cephalexin); Therapy: (Recorded:18Oct2017) to Recorded   16  Ondansetron 8 MG Oral Tablet Disintegrating; 1 tab PO Q 8 hr PRN nausea; Therapy: 75PFD3294 to (Last Rx:15Mar2017)  Requested for: 25VHU8563 Ordered   17  OxyCODONE HCl ER 10 MG Oral Tablet ER 12 Hour Abuse-Deterrent; TAKE 1 TABLET    EVERY 12 HOURS DAILY; Therapy: 12KKH6754 to (Evaluate:06Jan2018); Last Rx:87Agx5193 Ordered   18  OxyCONTIN 15 MG Oral Tablet ER 12 Hour Abuse-Deterrent; TAKE 1 TABLET EVERY    12 HOURS DAILY; Therapy: 28UJD7371 to (Evaluate:05Jan2018); Last Rx:42Vxk5043 Ordered   19  Pantoprazole Sodium 40 MG Oral Tablet Delayed Release (Protonix); TAKE 1 TABLET    DAILY IN AM PRIOR TO BREAKFAST; Therapy: 48HRA6576 to (Last Rx:02Yhy5373)  Requested for: 05FBB8882 Ordered   20  Percocet 5-325 MG Oral Tablet (Oxycodone-Acetaminophen); Therapy: (Recorded:18Oct2017) to Recorded   21  Potassium Chloride ER 10 MEQ Oral Tablet Extended Release; one daily  or as advised; Therapy: 86TGH7163 to [phone])  Requested for: 51BJW1900; Last    Rx:46Yau6956 Ordered   22  PredniSONE 20 MG Oral Tablet; TAKE 1 TABLET DAILY WITH FOOD AS DIRECTED; Therapy: 97ZUI0140 to (Last Rx:78Oja7945)  Requested for: 39LGL9986 Ordered   23  SLPG Magic Mouthwash 1:1:1 maalox/diphenhydramine/lidocaine; 15 ml PO q6 prn; Therapy: 12KTD7264 to (Evaluate:60Btm9727); Last Rx:05Oct2017 Ordered   24   TraMADol HCl - 50 MG Oral Tablet; TAKE 1 TABLET EVERY 6 HOURS AS NEEDED FOR    ONGOING PAIN; Therapy: 72LSP5842 to (Last Rx:91Oiu0611) Ordered    Allergies    1   Heparin    Signatures   Electronically signed by : Elliot Middleton RN; Dec 11 2017  8:39AM EST                       (Author)

## 2018-01-24 ENCOUNTER — OFFICE VISIT (OUTPATIENT)
Dept: GASTROENTEROLOGY | Facility: MEDICAL CENTER | Age: 63
End: 2018-01-24
Payer: MEDICARE

## 2018-01-24 VITALS
SYSTOLIC BLOOD PRESSURE: 140 MMHG | BODY MASS INDEX: 30.36 KG/M2 | WEIGHT: 224.13 LBS | DIASTOLIC BLOOD PRESSURE: 80 MMHG | HEIGHT: 72 IN | HEART RATE: 76 BPM

## 2018-01-24 VITALS
DIASTOLIC BLOOD PRESSURE: 74 MMHG | WEIGHT: 220 LBS | BODY MASS INDEX: 29.16 KG/M2 | TEMPERATURE: 98.6 F | HEIGHT: 73 IN | SYSTOLIC BLOOD PRESSURE: 136 MMHG | HEART RATE: 79 BPM

## 2018-01-24 DIAGNOSIS — K21.00 GASTROESOPHAGEAL REFLUX DISEASE WITH ESOPHAGITIS: Primary | ICD-10-CM

## 2018-01-24 PROCEDURE — 99214 OFFICE O/P EST MOD 30 MIN: CPT | Performed by: INTERNAL MEDICINE

## 2018-01-24 NOTE — RESULT NOTES
Discussion/Summary   A1C is low - concerning for hypoglycemia - insulin dose was decreased on visit , please send over log in 2 weeks      Verified Results  (1) HEMOGLOBIN A1C 22Jan2018 08:55AM Maryann GAMING Order Number: [id number]     Test Name Result Flag Reference   HEMOGLOBIN A1C 5 3 %  4 2-6 3   EST  AVG   GLUCOSE 105 mg/dl

## 2018-01-24 NOTE — PROGRESS NOTES
Consults    Reason for Consult / Principal Problem: Patient follow up for EGD 10/2017 for dysphagia  Patient presents with no symptoms other than occasional dysphagia  ASSESSMENT AND PLAN:    60year old male here for follow up  He has CLL and follow with onc  On chronic pred and developed steroid induced diabetes  1  GERD- with erosive esophagitis  - continue protonix for now   2  Prior HSV esophagitis/ulcer  - likely due to his immunocompromised state on pred  - will repeat EGD with biopsies again and possible dilation given his occasional dysphagia    Follow up in 6 months time       ______________________________________________________________________    HPI: 60year old male here for follow up  The pt reports the liquid carafate is too expensive  He does have carafate pills that he does take as needed  Is taking protonix 40mg daily in the am     He had herpes esophagitis in the past but on his recent EGD biopsies from Oct 2017 did not show this  REVIEW OF SYSTEMS:    CONSTITUTIONAL: Denies any fever, chills, rigors, and weight loss  HEENT: No earache or tinnitus  Denies hearing loss or visual disturbances  CARDIOVASCULAR: No chest pain or palpitations  RESPIRATORY: Denies any cough, hemoptysis, shortness of breath or dyspnea on exertion  GASTROINTESTINAL: As noted in the History of Present Illness  GENITOURINARY: No problems with urination  Denies any hematuria or dysuria  NEUROLOGIC: No dizziness or vertigo, denies headaches  MUSCULOSKELETAL: Denies any muscle or joint pain  SKIN: Denies skin rashes or itching  ENDOCRINE: Denies excessive thirst  Denies intolerance to heat or cold  PSYCHOSOCIAL: Denies depression or anxiety  Denies any recent memory loss         Historical Information   Past Medical History:   Diagnosis Date    CAD (coronary artery disease) 2004    Cellulitis of scalp     CKD (chronic kidney disease) stage 3, GFR 30-59 ml/min     [facility]     COPD [facility] obstructive [facility] disease) [facility]     [facility]     H/O blood clots     Hemolytic anemia (HCC)     History of TIA (transient ischemic attack)     Hyperlipidemia     Hypertension     Multiple gastric ulcers     Myocardial infarction     Recurrent sinusitis     Thrombocytopenia (HCC)     Vertigo      Past Surgical History:   Procedure Laterality Date    ARTHROSCOPIC REPAIR ACL      lt knee    PORTACATH PLACEMENT      OH ESOPHAGOGASTRODUODENOSCOPY TRANSORAL DIAGNOSTIC N/A 10/5/2017    Procedure: ESOPHAGOGASTRODUODENOSCOPY (EGD) with bx;  Surgeon: Jovani Grajeda DO;  Location[facility]; Service: Gastroenterology    [place] ESOPHAGOSCOPY FLEXIBLE TRANSORAL WITH BIOPSY N/A 9/2/2016    Procedure: ESOPHAGOGASTRODUODENOSCOPY (EGD); ESOPHAGEAL DILATION; ESOPHAGEAL BIOPSY;  Surgeon: Andrez Garcia MD;  Location:  MAIN OR;  Service: Thoracic    TONSILLECTOMY      UPPER GASTROINTESTINAL ENDOSCOPY      x 6     Social History   History   Alcohol Use No     History   Drug Use No     History   Smoking Status    Former Smoker   Smokeless Tobacco    Never Used     Comment: pt refuses to answer question     No family history on file  Meds/Allergies       (Not in a hospital admission)  No current facility-administered medications for this visit  Allergies   Allergen Reactions    Heparin Other (See Comments)     Other reaction(s): Blood Disorders  clot    Macrolides And Ketolides Other (See Comments)     Interacts with other meds he is taking           Objective     Blood pressure 136/74, pulse 79, temperature 98 6 °F (37 °C), temperature source Oral, height 6' 1" (1 854 m), weight 99 8 kg (220 lb)      [unfilled]      PHYSICAL EXAM:      General Appearance:   Alert, cooperative, no distress   HEENT:   Normocephalic, atraumatic, anicteric      Neck:  Supple, symmetrical, trachea midline   Lungs:   Clear to auscultation bilaterally; no rales, rhonchi or wheezing; respirations unlabored    Heart[de-identified]   Regular rate and rhythm; no murmur, rub, or gallop  Abdomen:   Soft, non-tender, non-distended; normal bowel sounds; no masses, no organomegaly    Genitalia:   Deferred    Rectal:   Deferred    Extremities:  No cyanosis, clubbing or edema    Pulses:  2+ and symmetric all extremities    Skin:  No jaundice, rashes, or lesions    Lymph nodes:  No palpable cervical lymphadenopathy        Lab Results:   No visits with results within 1 Day(s) from this visit  Latest known visit with results is:   Appointment on 01/22/2018   Component Date Value    Hemoglobin A1C 01/22/2018 5 3     EAG 01/22/2018 105        Imaging Studies: I have personally reviewed pertinent imaging studies

## 2018-01-29 ENCOUNTER — HOSPITAL ENCOUNTER (OUTPATIENT)
Dept: INFUSION CENTER | Facility: CLINIC | Age: 63
Discharge: HOME | End: 2018-01-29
Payer: MEDICARE

## 2018-01-29 LAB
ALBUMIN SERPL BCP-MCNC: 4 G/DL (ref 3.2–5)
ALP SERPL-CCNC: 45 U/L (ref 46–116)
ALT SERPL W P-5'-P-CCNC: 25 U/L (ref 12–78)
ANION GAP SERPL CALCULATED.3IONS-SCNC: 12 MMOL/L (ref 4–13)
ANISOCYTOSIS BLD QL SMEAR: PRESENT
AST SERPL W P-5'-P-CCNC: 29 U/L (ref 5–45)
BASOPHILS # BLD AUTO: 0 THOUSAND/UL (ref 0–0.1)
BASOPHILS NFR MAR MANUAL: 0 % (ref 0–1)
BILIRUB DIRECT SERPL-MCNC: 0.15 MG/DL (ref 0–0.2)
BILIRUB SERPL-MCNC: 0.8 MG/DL (ref 0.2–1)
BUN SERPL-MCNC: 17 MG/DL (ref 5–25)
CALCIUM SERPL-MCNC: 9.6 MG/DL (ref 8.3–10.1)
CHLORIDE SERPL-SCNC: 105 MMOL/L (ref 98–108)
CO2 SERPL-SCNC: 27 MMOL/L (ref 21–32)
CREAT SERPL-MCNC: 1.2 MG/DL (ref 0.6–1.3)
EOSINOPHIL # BLD AUTO: 0.19 THOUSAND/UL (ref 0–0.61)
EOSINOPHIL NFR BLD MANUAL: 6 % (ref 0–6)
ERYTHROCYTE [DISTWIDTH] IN BLOOD BY AUTOMATED COUNT: 15.4 % (ref 11.6–15.1)
GFR SERPL CREATININE-BSD FRML MDRD: 64 ML/MIN/1.73SQ M
GLUCOSE SERPL-MCNC: 105 MG/DL (ref 65–140)
HCT VFR BLD AUTO: 42.6 % (ref 36.5–49.3)
HGB BLD-MCNC: 14.5 G/DL (ref 12–17)
LG PLATELETS BLD QL SMEAR: PRESENT
LYMPHOCYTES # BLD AUTO: 0.38 THOUSAND/UL (ref 0.6–4.47)
LYMPHOCYTES # BLD AUTO: 12 % (ref 14–44)
MCH RBC QN AUTO: 28 PG (ref 26.8–34.3)
MCHC RBC AUTO-ENTMCNC: 34 G/DL (ref 31.4–37.4)
MCV RBC AUTO: 83 FL (ref 82–98)
MONOCYTES # BLD AUTO: 0.19 THOUSAND/UL (ref 0–1.22)
MONOCYTES NFR BLD AUTO: 6 % (ref 4–12)
NEUTS BAND NFR BLD MANUAL: 1 % (ref 0–8)
NEUTS SEG # BLD: 2.43 THOUSAND/UL (ref 1.81–6.82)
NEUTS SEG NFR BLD AUTO: 75 % (ref 43–75)
OVALOCYTES BLD QL SMEAR: PRESENT
PLATELET # BLD AUTO: 44 THOUSANDS/UL (ref 149–390)
PLATELET BLD QL SMEAR: ABNORMAL
PMV BLD AUTO: 9.1 FL (ref 8.9–12.7)
POTASSIUM SERPL-SCNC: 4.1 MMOL/L (ref 3.5–5.3)
PROT SERPL-MCNC: 5.8 G/DL (ref 6.4–8.2)
RBC # BLD AUTO: 5.16 MILLION/UL (ref 3.88–5.62)
RETICS # AUTO: NORMAL 10*3/UL (ref 14356–105094)
RETICS # CALC: 1.12 % (ref 0.37–1.87)
SODIUM SERPL-SCNC: 144 MMOL/L (ref 136–145)
TOTAL CELLS COUNTED SPEC: 100
WBC # BLD AUTO: 3.2 THOUSAND/UL (ref 4.31–10.16)
WBC NRBC COR # BLD: 3.2 THOUSAND/UL (ref 4.31–10.16)

## 2018-01-29 PROCEDURE — 80053 COMPREHEN METABOLIC PANEL: CPT | Performed by: INTERNAL MEDICINE

## 2018-01-29 PROCEDURE — 85027 COMPLETE CBC AUTOMATED: CPT | Performed by: INTERNAL MEDICINE

## 2018-01-29 PROCEDURE — 85045 AUTOMATED RETICULOCYTE COUNT: CPT | Performed by: INTERNAL MEDICINE

## 2018-01-29 PROCEDURE — 82248 BILIRUBIN DIRECT: CPT | Performed by: INTERNAL MEDICINE

## 2018-01-29 PROCEDURE — 85007 BL SMEAR W/DIFF WBC COUNT: CPT | Performed by: INTERNAL MEDICINE

## 2018-01-31 PROBLEM — K21.9 GASTROESOPHAGEAL REFLUX DISEASE WITHOUT ESOPHAGITIS: Status: ACTIVE | Noted: 2018-01-31

## 2018-02-05 ENCOUNTER — HOSPITAL ENCOUNTER (OUTPATIENT)
Dept: INFUSION CENTER | Facility: CLINIC | Age: 63
Discharge: HOME | End: 2018-02-05
Payer: MEDICARE

## 2018-02-05 LAB
ALBUMIN SERPL BCP-MCNC: 4 G/DL (ref 3.2–5)
ALP SERPL-CCNC: 49 U/L (ref 46–116)
ALT SERPL W P-5'-P-CCNC: 21 U/L (ref 12–78)
ANION GAP SERPL CALCULATED.3IONS-SCNC: 8 MMOL/L (ref 4–13)
ANISOCYTOSIS BLD QL SMEAR: PRESENT
AST SERPL W P-5'-P-CCNC: 27 U/L (ref 5–45)
BASOPHILS # BLD AUTO: 0 THOUSAND/UL (ref 0–0.1)
BASOPHILS NFR MAR MANUAL: 0 % (ref 0–1)
BILIRUB DIRECT SERPL-MCNC: 0.14 MG/DL (ref 0–0.2)
BILIRUB SERPL-MCNC: 0.8 MG/DL (ref 0.2–1)
BUN SERPL-MCNC: 19 MG/DL (ref 5–25)
CALCIUM SERPL-MCNC: 9.3 MG/DL (ref 8.3–10.1)
CHLORIDE SERPL-SCNC: 104 MMOL/L (ref 98–108)
CO2 SERPL-SCNC: 29 MMOL/L (ref 21–32)
CREAT SERPL-MCNC: 1.2 MG/DL (ref 0.6–1.3)
EOSINOPHIL # BLD AUTO: 0.07 THOUSAND/UL (ref 0–0.61)
EOSINOPHIL NFR BLD MANUAL: 2 % (ref 0–6)
ERYTHROCYTE [DISTWIDTH] IN BLOOD BY AUTOMATED COUNT: 15.8 % (ref 11.6–15.1)
GFR SERPL CREATININE-BSD FRML MDRD: 64 ML/MIN/1.73SQ M
GLUCOSE SERPL-MCNC: 116 MG/DL (ref 65–140)
HCT VFR BLD AUTO: 42.8 % (ref 36.5–49.3)
HGB BLD-MCNC: 14.3 G/DL (ref 12–17)
LG PLATELETS BLD QL SMEAR: PRESENT
LYMPHOCYTES # BLD AUTO: 0.63 THOUSAND/UL (ref 0.6–4.47)
LYMPHOCYTES # BLD AUTO: 17 % (ref 14–44)
MCH RBC QN AUTO: 28.4 PG (ref 26.8–34.3)
MCHC RBC AUTO-ENTMCNC: 33.5 G/DL (ref 31.4–37.4)
MCV RBC AUTO: 85 FL (ref 82–98)
METAMYELOCYTES NFR BLD MANUAL: 1 % (ref 0–1)
MONOCYTES # BLD AUTO: 0.19 THOUSAND/UL (ref 0–1.22)
MONOCYTES NFR BLD AUTO: 5 % (ref 4–12)
MYELOCYTES NFR BLD MANUAL: 1 % (ref 0–1)
NEUTS BAND NFR BLD MANUAL: 2 % (ref 0–8)
NEUTS SEG # BLD: 2.74 THOUSAND/UL (ref 1.81–6.82)
NEUTS SEG NFR BLD AUTO: 72 % (ref 43–75)
OVALOCYTES BLD QL SMEAR: PRESENT
PLATELET # BLD AUTO: 53 THOUSANDS/UL (ref 149–390)
PLATELET BLD QL SMEAR: ABNORMAL
PMV BLD AUTO: 9.6 FL (ref 8.9–12.7)
POTASSIUM SERPL-SCNC: 4.2 MMOL/L (ref 3.5–5.3)
PROT SERPL-MCNC: 6 G/DL (ref 6.4–8.2)
RBC # BLD AUTO: 5.05 MILLION/UL (ref 3.88–5.62)
RETICS # AUTO: NORMAL 10*3/UL (ref 14356–105094)
RETICS # CALC: 1.31 % (ref 0.37–1.87)
SODIUM SERPL-SCNC: 141 MMOL/L (ref 136–145)
TOTAL CELLS COUNTED SPEC: 100
WBC # BLD AUTO: 3.7 THOUSAND/UL (ref 4.31–10.16)
WBC NRBC COR # BLD: 3.7 THOUSAND/UL (ref 4.31–10.16)

## 2018-02-05 PROCEDURE — 80053 COMPREHEN METABOLIC PANEL: CPT | Performed by: INTERNAL MEDICINE

## 2018-02-05 PROCEDURE — 85027 COMPLETE CBC AUTOMATED: CPT | Performed by: INTERNAL MEDICINE

## 2018-02-05 PROCEDURE — 82248 BILIRUBIN DIRECT: CPT | Performed by: INTERNAL MEDICINE

## 2018-02-05 PROCEDURE — 85045 AUTOMATED RETICULOCYTE COUNT: CPT | Performed by: INTERNAL MEDICINE

## 2018-02-05 PROCEDURE — 85007 BL SMEAR W/DIFF WBC COUNT: CPT | Performed by: INTERNAL MEDICINE

## 2018-02-07 ENCOUNTER — TELEPHONE (OUTPATIENT)
Dept: HEMATOLOGY ONCOLOGY | Facility: CLINIC | Age: 63
End: 2018-02-07

## 2018-02-07 NOTE — TELEPHONE ENCOUNTER
Spoke to patient  Appt rescheduled to next Weds  He complained of sore throat x 2 weeks  No fever/chills  No associated symptoms  He is able to swallow  He is seeing PCP next week  Discussed that he may need throat culture  We do not do that at our office  Advise to go to PCP or urgent care to have throat examined/possible throat culture  He voiced understanding in the above

## 2018-02-08 ENCOUNTER — TELEPHONE (OUTPATIENT)
Dept: ENDOCRINOLOGY | Facility: CLINIC | Age: 63
End: 2018-02-08

## 2018-02-12 ENCOUNTER — HOSPITAL ENCOUNTER (OUTPATIENT)
Dept: INFUSION CENTER | Facility: CLINIC | Age: 63
Discharge: HOME | End: 2018-02-12
Payer: MEDICARE

## 2018-02-12 ENCOUNTER — TRANSCRIBE ORDERS (OUTPATIENT)
Dept: LAB | Facility: CLINIC | Age: 63
End: 2018-02-12

## 2018-02-12 ENCOUNTER — APPOINTMENT (OUTPATIENT)
Dept: LAB | Facility: CLINIC | Age: 63
End: 2018-02-12
Payer: MEDICARE

## 2018-02-12 VITALS
RESPIRATION RATE: 16 BRPM | TEMPERATURE: 97.5 F | DIASTOLIC BLOOD PRESSURE: 80 MMHG | HEART RATE: 70 BPM | SYSTOLIC BLOOD PRESSURE: 130 MMHG

## 2018-02-12 DIAGNOSIS — E09.9 DRUG-INDUCED DIABETES MELLITUS (HCC): Primary | ICD-10-CM

## 2018-02-12 DIAGNOSIS — E09.9 DRUG-INDUCED DIABETES MELLITUS (HCC): ICD-10-CM

## 2018-02-12 DIAGNOSIS — E09.9 DRUG OR CHEMICAL INDUCED DIABETES MELLITUS WITHOUT COMPLICATIONS (HCC): ICD-10-CM

## 2018-02-12 LAB
ALBUMIN SERPL BCP-MCNC: 3.9 G/DL (ref 3.2–5)
ALP SERPL-CCNC: 44 U/L (ref 46–116)
ALT SERPL W P-5'-P-CCNC: 25 U/L (ref 12–78)
ANION GAP SERPL CALCULATED.3IONS-SCNC: 6 MMOL/L (ref 4–13)
ANISOCYTOSIS BLD QL SMEAR: PRESENT
AST SERPL W P-5'-P-CCNC: 25 U/L (ref 5–45)
BASOPHILS # BLD AUTO: 0 THOUSAND/UL (ref 0–0.1)
BASOPHILS NFR MAR MANUAL: 0 % (ref 0–1)
BILIRUB SERPL-MCNC: 0.7 MG/DL (ref 0.2–1)
BUN SERPL-MCNC: 22 MG/DL (ref 5–25)
CALCIUM SERPL-MCNC: 9.6 MG/DL (ref 8.3–10.1)
CHLORIDE SERPL-SCNC: 106 MMOL/L (ref 98–108)
CO2 SERPL-SCNC: 28 MMOL/L (ref 21–32)
CREAT SERPL-MCNC: 1.2 MG/DL (ref 0.6–1.3)
EOSINOPHIL # BLD AUTO: 0.28 THOUSAND/UL (ref 0–0.61)
EOSINOPHIL NFR BLD MANUAL: 10 % (ref 0–6)
ERYTHROCYTE [DISTWIDTH] IN BLOOD BY AUTOMATED COUNT: 15.2 % (ref 11.6–15.1)
EST. AVERAGE GLUCOSE BLD GHB EST-MCNC: 100 MG/DL
GFR SERPL CREATININE-BSD FRML MDRD: 64 ML/MIN/1.73SQ M
GLUCOSE SERPL-MCNC: 121 MG/DL (ref 65–140)
HBA1C MFR BLD: 5.1 % (ref 4.2–6.3)
HCT VFR BLD AUTO: 39.5 % (ref 36.5–49.3)
HGB BLD-MCNC: 13.3 G/DL (ref 12–17)
LG PLATELETS BLD QL SMEAR: PRESENT
LYMPHOCYTES # BLD AUTO: 0.7 THOUSAND/UL (ref 0.6–4.47)
LYMPHOCYTES # BLD AUTO: 25 % (ref 14–44)
MCH RBC QN AUTO: 28 PG (ref 26.8–34.3)
MCHC RBC AUTO-ENTMCNC: 33.7 G/DL (ref 31.4–37.4)
MCV RBC AUTO: 83 FL (ref 82–98)
MONOCYTES # BLD AUTO: 0.25 THOUSAND/UL (ref 0–1.22)
MONOCYTES NFR BLD AUTO: 9 % (ref 4–12)
NEUTS BAND NFR BLD MANUAL: 2 % (ref 0–8)
NEUTS SEG # BLD: 1.57 THOUSAND/UL (ref 1.81–6.82)
NEUTS SEG NFR BLD AUTO: 54 % (ref 43–75)
OVALOCYTES BLD QL SMEAR: PRESENT
PLATELET # BLD AUTO: 65 THOUSANDS/UL (ref 149–390)
PLATELET BLD QL SMEAR: ABNORMAL
PMV BLD AUTO: 8.6 FL (ref 8.9–12.7)
POTASSIUM SERPL-SCNC: 4.1 MMOL/L (ref 3.5–5.3)
PROT SERPL-MCNC: 5.7 G/DL (ref 6.4–8.2)
RBC # BLD AUTO: 4.75 MILLION/UL (ref 3.88–5.62)
SODIUM SERPL-SCNC: 140 MMOL/L (ref 136–145)
TOTAL CELLS COUNTED SPEC: 100
WBC # BLD AUTO: 2.8 THOUSAND/UL (ref 4.31–10.16)
WBC NRBC COR # BLD: 2.8 THOUSAND/UL (ref 4.31–10.16)

## 2018-02-12 PROCEDURE — 83036 HEMOGLOBIN GLYCOSYLATED A1C: CPT

## 2018-02-12 PROCEDURE — 80053 COMPREHEN METABOLIC PANEL: CPT | Performed by: INTERNAL MEDICINE

## 2018-02-12 PROCEDURE — 85007 BL SMEAR W/DIFF WBC COUNT: CPT | Performed by: INTERNAL MEDICINE

## 2018-02-12 PROCEDURE — 85027 COMPLETE CBC AUTOMATED: CPT | Performed by: INTERNAL MEDICINE

## 2018-02-12 PROCEDURE — 36415 COLL VENOUS BLD VENIPUNCTURE: CPT

## 2018-02-12 RX ORDER — SODIUM CHLORIDE 9 MG/ML
20 INJECTION, SOLUTION INTRAVENOUS CONTINUOUS
Status: DISCONTINUED | OUTPATIENT
Start: 2018-02-13 | End: 2018-02-16 | Stop reason: HOSPADM

## 2018-02-12 RX ORDER — ACETAMINOPHEN 325 MG/1
650 TABLET ORAL ONCE
Status: COMPLETED | OUTPATIENT
Start: 2018-02-13 | End: 2018-02-13

## 2018-02-12 NOTE — PROGRESS NOTES
Pt  Denies new symptoms or concerns  Labs obtained as ordered including Hgb AIC  Future appointments reviewed    Declined AVS

## 2018-02-12 NOTE — PLAN OF CARE
Problem: PAIN - ADULT  Goal: Verbalizes/displays adequate comfort level or baseline comfort level  Interventions:  - Encourage patient to monitor pain and request assistance  - Assess pain using appropriate pain scale  - Administer analgesics based on type and severity of pain and evaluate response  - Implement non-pharmacological measures as appropriate and evaluate response  - Consider cultural and social influences on pain and pain management  - Notify physician/advanced practitioner if interventions unsuccessful or patient reports new pain  Outcome: Progressing      Problem: INFECTION - ADULT  Goal: Absence or prevention of progression during hospitalization  INTERVENTIONS:  - Assess and monitor for signs and symptoms of infection  - Monitor lab/diagnostic results  - Monitor all insertion sites, i e  indwelling lines, tubes, and drains  - Monitor endotracheal (as able) and nasal secretions for changes in amount and color  - Wendell appropriate cooling/warming therapies per order  - Administer medications as ordered  - Instruct and encourage patient and family to use good hand hygiene technique  - Identify and instruct in appropriate isolation precautions for identified infection/condition  Outcome: Progressing    Goal: Absence of fever/infection during neutropenic period  INTERVENTIONS:  - Monitor WBC  - Implement neutropenic guidelines  Outcome: Progressing      Problem: Knowledge Deficit  Goal: Patient/family/caregiver demonstrates understanding of disease process, treatment plan, medications, and discharge instructions  Complete learning assessment and assess knowledge base    Interventions:  - Provide teaching at level of understanding  - Provide teaching via preferred learning methods  Outcome: Progressing

## 2018-02-13 ENCOUNTER — HOSPITAL ENCOUNTER (OUTPATIENT)
Dept: INFUSION CENTER | Facility: CLINIC | Age: 63
Discharge: HOME | End: 2018-02-13
Payer: MEDICARE

## 2018-02-13 VITALS
WEIGHT: 222.88 LBS | DIASTOLIC BLOOD PRESSURE: 83 MMHG | TEMPERATURE: 98.3 F | BODY MASS INDEX: 29.41 KG/M2 | SYSTOLIC BLOOD PRESSURE: 161 MMHG | HEART RATE: 82 BPM | RESPIRATION RATE: 18 BRPM

## 2018-02-13 PROCEDURE — 96413 CHEMO IV INFUSION 1 HR: CPT

## 2018-02-13 PROCEDURE — 96367 TX/PROPH/DG ADDL SEQ IV INF: CPT

## 2018-02-13 PROCEDURE — 96415 CHEMO IV INFUSION ADDL HR: CPT

## 2018-02-13 RX ADMIN — SODIUM CHLORIDE 20 ML/HR: 0.9 INJECTION, SOLUTION INTRAVENOUS at 08:42

## 2018-02-13 RX ADMIN — ACETAMINOPHEN 650 MG: 325 TABLET, FILM COATED ORAL at 08:42

## 2018-02-13 RX ADMIN — DEXAMETHASONE SODIUM PHOSPHATE 20 MG: 10 INJECTION, SOLUTION INTRAMUSCULAR; INTRAVENOUS at 09:04

## 2018-02-13 RX ADMIN — OBINUTUZUMAB 1000 MG: 1000 INJECTION, SOLUTION, CONCENTRATE INTRAVENOUS at 09:27

## 2018-02-13 RX ADMIN — DIPHENHYDRAMINE HYDROCHLORIDE 25 MG: 50 INJECTION, SOLUTION INTRAMUSCULAR; INTRAVENOUS at 08:43

## 2018-02-13 NOTE — PROGRESS NOTES
The patient reported a sore throat over the past two weeks  He has not been prescribed antibiotic for it and reports pain when he swallows  He is afebrile and does not have an elevated WBC  Anjana Lane for Dr Dent How is aware of sore throat  Ok to proceed with Ryan

## 2018-02-13 NOTE — PLAN OF CARE

## 2018-02-14 ENCOUNTER — OFFICE VISIT (OUTPATIENT)
Dept: HEMATOLOGY ONCOLOGY | Facility: CLINIC | Age: 63
End: 2018-02-14
Payer: MEDICARE

## 2018-02-14 VITALS
HEIGHT: 73 IN | OXYGEN SATURATION: 98 % | WEIGHT: 222.8 LBS | RESPIRATION RATE: 16 BRPM | HEART RATE: 70 BPM | SYSTOLIC BLOOD PRESSURE: 138 MMHG | BODY MASS INDEX: 29.53 KG/M2 | TEMPERATURE: 99.4 F | DIASTOLIC BLOOD PRESSURE: 90 MMHG

## 2018-02-14 DIAGNOSIS — D58.9 HEREDITARY HEMOLYTIC ANEMIA (HCC): ICD-10-CM

## 2018-02-14 DIAGNOSIS — C91.10 CLL (CHRONIC LYMPHOCYTIC LEUKEMIA) (HCC): Primary | ICD-10-CM

## 2018-02-14 PROCEDURE — 99214 OFFICE O/P EST MOD 30 MIN: CPT | Performed by: PHYSICIAN ASSISTANT

## 2018-02-14 RX ORDER — PREDNISONE 1 MG/1
5 TABLET ORAL DAILY
Qty: 30 TABLET | Refills: 1 | Status: ON HOLD | OUTPATIENT
Start: 2018-02-14 | End: 2018-03-08

## 2018-02-14 NOTE — PROGRESS NOTES
Hematology/Oncology Outpatient Follow-up  Margoth Yusuf 60 y o  male 1955 [id number]    Date:  2/14/2018      Assessment and Plan:  1  CLL (chronic lymphocytic leukemia) (HCC)   - Patient's disease is currently under control with Ibrutinib 140 mg, 1 tab daily and Gazyva 1000 mg every 4 weeks    - He feels well at this time  He did not offer complaints at his visit  -  CBC has been acceptable over the last weeks  2   Hemolytic anemia (HCC)   - Controlled at this time  He continues on 10 mg of prednisone  - Hemoglobin has been stable  He has not required blood transfusions recently  Labs: CBC and CMP weekly, retic and bilirubin monthly       HPI:  On 3/20/17 patient found to have hemoglobin of 6 2, ,000  He was admitted to [place] [address] for blood transfusion and plan to transfer to [address] [place] [address]  On 3/20/17 WBC count 195,000, hemoglobin 4 9, platelet count 65  Direct coomb's was positive with undetectable haptoglobin  He was given 2 units of PRBCs, Solu-Medrol 1 g ×3 days and IVIG 1 g/kg daily ×3 days  His hemoglobin continued to drop  Bone marrow biopsy on 3/21 showed marrow cellularity of greater than 95% almost replaced by small lymphocytes, lambda light chain restricted, CD20 positive, CD19 positive, CD23 positive partially expressing CD11c and CD25 and CD5 positive and negative for CD 79 and CD38  He was treated with single dose of chlorambucil to control his CLL   3/22 second dose of chlorambucil, also Rituxan 375 mg/M ² autoimmune hemolytic anemia  WBC continued to rise, 266,000  Patient initiated with Venetoclax 20 mg daily  Tumor lysis monitored every 8 hours; given aggressive hydration and Lasix and remained euvolemic  3/24-WBC dropped to 112,000  3/25- WBC 63,000  3/26-WBC 15,000, , platelet count 57,552  Patient became febrile, 101 3  The cefepime initiated Venetoclax held  3/28-patient fell from bed, CT head negative   ANC 1200, cefepime discontinued and Levaquin 75 mg daily started sliding 3/29 given second dose of rituximab 375 mg/m ²   3/30-WBC meg to 14 5 thousand, platelets 02,144, Venetoclax restarted 10 mg every other day  3/31 WBC 4 4, , platelet count 64,003  4/1-4/4: WBC maintained less than 10,000, ANC and platelet count meg  He was discharged on Venetoclax 10 mg every other day  He was instructed to discontinue simvastatin, Ranexa, aspirin, metoprolol 50 mg q  day, losartan 50 mg q  day, Plavix 75 mg q  day, folic acid 082 µg b i d , prednisone 60 mg, allopurinol  He was advised to continue Levaquin 750 mg daily for 10 days, Lexapro 10 mg daily, fenofibrate 50 mg daily, gabapentin 100 mg twice daily, Protonix 40 mg daily    Imaging studies performed at [facility]:  Patient had echocardiogram while inpatient at [facility] on 3/21  This study was unremarkable  CT chest abdomen pelvis without contrast on 3/24 showed stable pulmonary nodules, patchy groundglass densities of lower lobes previously identified and which may represent volume loss or early infiltrate  Persistent diffuse bronchial wall thickening suggesting acute/chronic bronchitis changes  No bronchiectasis  No masses  Stable lymphadenopathy of mediastinum  Stable axillary lymphadenopathy  Splenomegaly 23 9 cm  Extensive lymphadenopathy throughout the entire retroperitoneal, small bowel mesentery, and pelvis  Largest lymph node in right lower quadrant measured 4 6 x 4 8  Stable enlarged left para-aortic lymph node measuring 6 2 x 6 8   4/12/17: Patient received one dose of Rituxan on 4/7/17  Venetoclax 10 mg every other day 4/5/17 - 4/9/17  Venetoclax 10 mg every day beginning 4/10/17  Monitoring CBC 3 times per week  4/28/17: patient had follow-up appointment at [facility] with Dr James Henning  She recommended to slowly increase dose of veneteclax   Also, she recommended as he has had numerous treatments with Rituxan, if antibody directed therapy becomes indicated in the future recommendation was with [facility]  She will be seeing her on a p r n  Basis  July 2017- Hemolysis  Disease not under control with veneteclax  Started prednisone 60 mg daily, folic acid, IBRUTINIB 749 mg daily and Gazyva  Sept 2017- 9/18-9/20 patient was admitted due to uncontrolled hyperglycemia with new onset DM type II due to chronic prednisone use for CLL/hemolytic anemia; also found to have profound neutropenia  Ibrutinib dose was reduced to 280 mg daily     October 2017- 10/7/17 - 10/14/17 patient was admitted due to cellulitis on his scalp    Current therapy:   Ibrutinib 140 mg, 1 tab daily  Gazyva 1000 mg every 4 weeks      No history exists  Interval history: sore throat was evaluated at urgent care; took antibiotic he had at home for 3 days and it resolved   Sugars are under control     ROS: Review of Systems   Constitutional: Negative for appetite change, chills, fever and unexpected weight change  HENT: Negative for congestion, mouth sores, nosebleeds, sinus pain and sore throat  Respiratory: Negative for cough, chest tightness, shortness of breath and wheezing  Cardiovascular: Negative for chest pain, palpitations and leg swelling  Gastrointestinal: Negative for abdominal pain, blood in stool, constipation, diarrhea, nausea and vomiting  Genitourinary: Negative for difficulty urinating, dysuria and hematuria  Musculoskeletal: Negative for arthralgias, joint swelling and myalgias  Skin: Negative  Neurological: Negative for dizziness, weakness, light-headedness, numbness and headaches  Hematological: Negative  Psychiatric/Behavioral: Negative          Past Medical History:   Diagnosis Date    CAD (coronary artery disease) 2004    Cellulitis of scalp     CKD (chronic kidney disease) stage 3, GFR 30-59 ml/min     [facility] [address] [facility]     [facility]     [facility]  H/O blood clots     Hemolytic anemia (HCC)     History of TIA (transient ischemic attack)     Hyperlipidemia     Hypertension     Multiple gastric ulcers     Myocardial infarction     Recurrent sinusitis     Thrombocytopenia (HCC)     Vertigo        Past Surgical History:   Procedure Laterality Date    ARTHROSCOPIC REPAIR ACL      lt knee    PORTACATH PLACEMENT      [place] ESOPHAGOGASTRODUODENOSCOPY TRANSORAL DIAGNOSTIC N/A 10/5/2017    Procedure: ESOPHAGOGASTRODUODENOSCOPY (EGD) with bx;  Surgeon: William Gannon DO;  Location: [facility]; Service: Gastroenterology    HI ESOPHAGOSCOPY FLEXIBLE TRANSORAL WITH BIOPSY N/A 9/2/2016    Procedure: ESOPHAGOGASTRODUODENOSCOPY (EGD); ESOPHAGEAL DILATION; ESOPHAGEAL BIOPSY;  Surgeon: Jacque Irwin MD;  [facility];  Service: Thoracic    TONSILLECTOMY      UPPER GASTROINTESTINAL ENDOSCOPY      x 6       Social History     Social History    Marital status: /Civil Union     Spouse name: N/A    Number of children: N/A    Years of education: N/A     Social History Main Topics    Smoking status: Former Smoker    Smokeless tobacco: Never Used      Comment: pt refuses to answer question    Alcohol use No    Drug use: No    Sexual activity: Not Asked     Other Topics Concern    None     Social History Narrative    None       No family history on file      Allergies   Allergen Reactions    Heparin Other (See Comments)     Other reaction(s): Blood Disorders  clot    Macrolides And Ketolides Other (See Comments)     Interacts with other meds he is taking         Current Outpatient Prescriptions:     acetaminophen (TYLENOL) 325 mg tablet, Take 2 tablets by mouth every 6 (six) hours as needed for fever (fever > 101 4), Disp: 60 tablet, Rfl: 0    acyclovir (ZOVIRAX) 400 MG tablet, Take 400 mg by mouth 2 (two) times a day, Disp: , Rfl:     aspirin 81 MG tablet, Take 81 mg by mouth every other day Every other day , Disp: , Rfl:    benzonatate (TESSALON) 200 MG capsule, Take 200 mg by mouth 3 (three) times a day as needed for cough, Disp: , Rfl:     citalopram (CeleXA) 40 mg tablet, Take 40 mg by mouth daily, Disp: , Rfl:     fenofibrate (TRIGLIDE) 50 MG tablet, Take 134 mg by mouth daily  , Disp: , Rfl:     folic acid (FOLVITE) 1 mg tablet, Take 1 mg by mouth daily, Disp: , Rfl:     gabapentin (NEURONTIN) 300 mg capsule, Take 2 capsules by mouth 3 (three) times a day (Patient taking differently: Take 600 mg by mouth daily  ), Disp: 90 capsule, Rfl: 0    glucose monitoring kit (FREESTYLE) monitoring kit, 1 each by Does not apply route as needed ( ) E 11 9, Disp: 1 each, Rfl: 0    Ibrutinib 140 MG CAPS, Take 140 mg by mouth daily  , Disp: , Rfl:     insulin glargine (LANTUS) 100 units/mL subcutaneous injection, Inject 15 Units under the skin every morning (Patient taking differently: Inject 12 Units under the skin every morning  ), Disp: 10 mL, Rfl: 0    insulin lispro (HumaLOG) 100 units/mL injection, Inject 10 Units under the skin 3 (three) times a day with meals, Disp: , Rfl: 0    Lancets (FREESTYLE) lancets, Use as instructed--test 2 times a day  E 11 9, Disp: 100 each, Rfl: 0    multivitamin (THERAGRAN) TABS, Take 1 tablet by mouth daily, Disp: , Rfl:     obinutuzumab (GAZYVA) 1000 mg/40 mL SOLN, Infuse into a venous catheter, Disp: , Rfl:     pantoprazole (PROTONIX) 40 mg tablet, Take 40 mg by mouth daily  , Disp: , Rfl:     predniSONE 20 mg tablet, Take 1 tablet by mouth daily (Patient taking differently: Take 10 mg by mouth daily  ), Disp: , Rfl: 0    LORazepam (ATIVAN) 0 5 mg tablet, Take 1 tablet by mouth every 8 (eight) hours as needed for anxiety for up to 10 days, Disp: 30 tablet, Rfl: 0  No current facility-administered medications for this visit       Facility-Administered Medications Ordered in Other Visits:     sodium chloride 0 9 % infusion, 20 mL/hr, Intravenous, Continuous, Lynne Goltz, MD, Stopped at 02/13/18 1300      Physical Exam:  /90 (BP Location: Left arm, Patient Position: Sitting, Cuff Size: Adult)   Pulse 70   Temp 99 4 °F (37 4 °C)   Resp 16   Ht 6' 1" (1 854 m)   Wt 101 kg (222 lb 12 8 oz)   SpO2 98%   BMI 29 39 kg/m²     Physical Exam   Constitutional: He is oriented to person, place, and time  He appears well-developed and well-nourished  No distress  HENT:   Head: Normocephalic and atraumatic  Eyes: Conjunctivae are normal  No scleral icterus  Neck: Normal range of motion  Neck supple  Cardiovascular: Normal rate, regular rhythm and normal heart sounds  No murmur heard  Pulmonary/Chest: Effort normal and breath sounds normal  No respiratory distress  Abdominal: Soft  There is no tenderness  Musculoskeletal: Normal range of motion  He exhibits no edema or tenderness  Lymphadenopathy:     He has no cervical adenopathy  Neurological: He is alert and oriented to person, place, and time  No cranial nerve deficit  Skin: Skin is warm and dry  Psychiatric: He has a normal mood and affect  Vitals reviewed  Labs:  Lab Results   Component Value Date    WBC 2 80 (L) 02/12/2018    HGB 13 3 02/12/2018    HCT 39 5 02/12/2018    MCV 83 02/12/2018    PLT 65 (L) 02/12/2018     Lab Results   Component Value Date     02/12/2018    K 4 1 02/12/2018     02/12/2018    CO2 28 02/12/2018    ANIONGAP 6 02/12/2018    BUN 22 02/12/2018    CREATININE 1 20 02/12/2018    GLUCOSE 121 02/12/2018    GLUF 117 (H) 06/19/2017    CALCIUM 9 6 02/12/2018    AST 25 02/12/2018    ALT 25 02/12/2018    ALKPHOS 44 (L) 02/12/2018    PROT 5 7 (L) 02/12/2018    BILITOT 0 70 02/12/2018    EGFR 64 02/12/2018     @RESULTFAST(TSH)@    Patient voiced understanding and agreement in the above discussion  Aware to contact our office with questions/symptoms in the interim

## 2018-02-14 NOTE — LETTER
February 14, 2018     Cole Ybarra, [address]  [address] [facility] [address]  [place]  [address]  [address]    Patient: Rohini Cherry   YOB: 1955   Date of Visit: 2/14/2018       Dear Dr Crystal Hernandez: Thank you for referring Luana Reveles to me for evaluation  Below are my notes for this consultation  If you have questions, please do not hesitate to call me  I look forward to following your patient along with you  Sincerely,        Nikolas Modi PA-C        CC: No Recipients  Vickey Franco  2/14/2018 10:23 PM  Sign at close encounter  Hematology/Oncology Outpatient Follow-up  Rohini Cherry 60 y o  male 1955 [id number]    Date:  2/14/2018      Assessment and Plan:  1  CLL (chronic lymphocytic leukemia) (HCC)   - Patient's disease is currently under control with Ibrutinib 140 mg, 1 tab daily and Gazyva 1000 mg every 4 weeks    - He feels well at this time  He did not offer complaints at his visit  -  CBC has been acceptable over the last weeks  2   Hemolytic anemia (HCC)   - Controlled at this time  He continues on 10 mg of prednisone  - Hemoglobin has been stable  He has not required blood transfusions recently  Labs: CBC and CMP weekly, retic and bilirubin monthly       HPI:  On 3/20/17 patient found to have hemoglobin of 6 2, ,000  He was admitted to [address] [place] [address] for blood transfusion and plan to transfer to [address] [place] [address]  On 3/20/17 WBC count 195,000, hemoglobin 4 9, platelet count 65  Direct coomb's was positive with undetectable haptoglobin  He was given 2 units of PRBCs, Solu-Medrol 1 g ×3 days and IVIG 1 g/kg daily ×3 days  His hemoglobin continued to drop   Bone marrow biopsy on 3/21 showed marrow cellularity of greater than 95% almost replaced by small lymphocytes, lambda light chain restricted, CD20 positive, CD19 positive, CD23 positive partially expressing CD11c and CD25 and CD5 positive and negative for CD 79 and CD38   He was treated with single dose of chlorambucil to control his CLL   3/22 second dose of chlorambucil, also Rituxan 375 mg/M ² autoimmune hemolytic anemia  WBC continued to rise, 266,000  Patient initiated with Venetoclax 20 mg daily  Tumor lysis monitored every 8 hours; given aggressive hydration and Lasix and remained euvolemic  3/24WBC dropped to 112,000  3/25- WBC 63,000  3/26WBC 15,000, , platelet count 13,391  Patient became febrile, 101 3  The cefepime initiated Venetoclax held  3/28patient fell from bed, CT head negative  ANC 1200, cefepime discontinued and Levaquin 75 mg daily started sliding 3/29 given second dose of rituximab 375 mg/m ²   3/30WBC meg to 14 5 thousand, platelets 44,686, Venetoclax restarted 10 mg every other day  3/31 WBC 4 4, , platelet count 50,712  4/14/4: WBC maintained less than 10,000, ANC and platelet count meg  He was discharged on Venetoclax 10 mg every other day  He was instructed to discontinue simvastatin, Ranexa, aspirin, metoprolol 50 mg q  day, losartan 50 mg q  day, Plavix 75 mg q  day, folic acid 759 µg b i d , prednisone 60 mg, allopurinol  He was advised to continue Levaquin 750 mg daily for 10 days, Lexapro 10 mg daily, fenofibrate 50 mg daily, gabapentin 100 mg twice daily, Protonix 40 mg daily    Imaging studies performed at [facility]:  Patient had echocardiogram while inpatient at [facility] on 3/21  This study was unremarkable  CT chest abdomen pelvis without contrast on 3/24 showed stable pulmonary nodules, patchy groundglass densities of lower lobes previously identified and which may represent volume loss or early infiltrate  Persistent diffuse bronchial wall thickening suggesting acute/chronic bronchitis changes  No bronchiectasis  No masses  Stable lymphadenopathy of mediastinum  Stable axillary lymphadenopathy  Splenomegaly 23 9 cm   Extensive lymphadenopathy throughout the entire retroperitoneal, small bowel mesentery, and pelvis  Largest lymph node in right lower quadrant measured 4 6 x 4 8  Stable enlarged left para-aortic lymph node measuring 6 2 x 6 8   4/12/17: Patient received one dose of Rituxan on 4/7/17  Venetoclax 10 mg every other day 4/5/17 - 4/9/17  Venetoclax 10 mg every day beginning 4/10/17  Monitoring CBC 3 times per week  4/28/17: patient had follow-up appointment at [facility] with Dr Ruperto Ye  She recommended to slowly increase dose of veneteclax  Also, she recommended as he has had numerous treatments with Rituxan, if antibody directed therapy becomes indicated in the future recommendation was with [facility]  She will be seeing her on a p r n  Basis  July 2017- Hemolysis  Disease not under control with veneteclax  Started prednisone 60 mg daily, folic acid, IBRUTINIB 337 mg daily and Gazyva  Sept 2017- 9/18-9/20 patient was admitted due to uncontrolled hyperglycemia with new onset DM type II due to chronic prednisone use for CLL/hemolytic anemia; also found to have profound neutropenia  Ibrutinib dose was reduced to 280 mg daily     October 2017- 10/7/17 - 10/14/17 patient was admitted due to cellulitis on his scalp    Current therapy:   Ibrutinib 140 mg, 1 tab daily  Gazyva 1000 mg every 4 weeks      No history exists  Interval history: sore throat was evaluated at urgent care; took antibiotic he had at home for 3 days and it resolved   Sugars are under control     ROS: Review of Systems   Constitutional: Negative for appetite change, chills, fever and unexpected weight change  HENT: Negative for congestion, mouth sores, nosebleeds, sinus pain and sore throat  Respiratory: Negative for cough, chest tightness, shortness of breath and wheezing  Cardiovascular: Negative for chest pain, palpitations and leg swelling  Gastrointestinal: Negative for abdominal pain, blood in stool, constipation, diarrhea, nausea and vomiting     Genitourinary: Negative for difficulty urinating, dysuria and hematuria  Musculoskeletal: Negative for arthralgias, joint swelling and myalgias  Skin: Negative  Neurological: Negative for dizziness, weakness, light-headedness, numbness and headaches  Hematological: Negative  Psychiatric/Behavioral: Negative  Past Medical History:   Diagnosis Date    CAD (coronary artery disease) 2004    Cellulitis of scalp     CKD (chronic kidney disease) stage 3, GFR 30-59 ml/min     [facility]     [facility]) ([facility] )     Diabetes mellitus ([facility] )     H/O blood clots     Hemolytic anemia (HCC)     History of TIA (transient ischemic attack)     Hyperlipidemia     Hypertension     Multiple gastric ulcers     Myocardial infarction     Recurrent sinusitis     Thrombocytopenia (HCC)     Vertigo        Past Surgical History:   Procedure Laterality Date    ARTHROSCOPIC REPAIR ACL      lt knee    PORTACATH PLACEMENT      MS ESOPHAGOGASTRODUODENOSCOPY TRANSORAL DIAGNOSTIC N/A 10/5/2017    Procedure: ESOPHAGOGASTRODUODENOSCOPY (EGD) with bx;  Surgeon: Tacos Watts DO;  [facility]; Service: Gastroenterology    MS ESOPHAGOSCOPY FLEXIBLE TRANSORAL WITH BIOPSY N/A 9/2/2016    Procedure: ESOPHAGOGASTRODUODENOSCOPY (EGD); ESOPHAGEAL DILATION; ESOPHAGEAL BIOPSY;  Surgeon: Gwyn Charles MD;  Location:  MAIN OR;  Service: Thoracic    TONSILLECTOMY      UPPER GASTROINTESTINAL ENDOSCOPY      x 6       Social History     Social History    Marital status: /Civil Union     Spouse name: N/A    Number of children: N/A    Years of education: N/A     Social History Main Topics    Smoking status: Former Smoker    Smokeless tobacco: Never Used      Comment: pt refuses to answer question    Alcohol use No    Drug use: No    Sexual activity: Not Asked     Other Topics Concern    None     Social History Narrative    None       No family history on file      Allergies   Allergen Reactions  Heparin Other (See Comments)     Other reaction(s): Blood Disorders  clot    Macrolides And Ketolides Other (See Comments)     Interacts with other meds he is taking         Current Outpatient Prescriptions:     acetaminophen (TYLENOL) 325 mg tablet, Take 2 tablets by mouth every 6 (six) hours as needed for fever (fever > 101 4), Disp: 60 tablet, Rfl: 0    acyclovir (ZOVIRAX) 400 MG tablet, Take 400 mg by mouth 2 (two) times a day, Disp: , Rfl:     aspirin 81 MG tablet, Take 81 mg by mouth every other day Every other day , Disp: , Rfl:     benzonatate (TESSALON) 200 MG capsule, Take 200 mg by mouth 3 (three) times a day as needed for cough, Disp: , Rfl:     citalopram (CeleXA) 40 mg tablet, Take 40 mg by mouth daily, Disp: , Rfl:     fenofibrate (TRIGLIDE) 50 MG tablet, Take 134 mg by mouth daily  , Disp: , Rfl:     folic acid (FOLVITE) 1 mg tablet, Take 1 mg by mouth daily, Disp: , Rfl:     gabapentin (NEURONTIN) 300 mg capsule, Take 2 capsules by mouth 3 (three) times a day (Patient taking differently: Take 600 mg by mouth daily  ), Disp: 90 capsule, Rfl: 0    glucose monitoring kit (FREESTYLE) monitoring kit, 1 each by Does not apply route as needed ( ) E 11 9, Disp: 1 each, Rfl: 0    Ibrutinib 140 MG CAPS, Take 140 mg by mouth daily  , Disp: , Rfl:     insulin glargine (LANTUS) 100 units/mL subcutaneous injection, Inject 15 Units under the skin every morning (Patient taking differently: Inject 12 Units under the skin every morning  ), Disp: 10 mL, Rfl: 0    insulin lispro (HumaLOG) 100 units/mL injection, Inject 10 Units under the skin 3 (three) times a day with meals, Disp: , Rfl: 0    Lancets (FREESTYLE) lancets, Use as instructed--test 2 times a day  E 11 9, Disp: 100 each, Rfl: 0    multivitamin (THERAGRAN) TABS, Take 1 tablet by mouth daily, Disp: , Rfl:     obinutuzumab (GAZYVA) 1000 mg/40 mL SOLN, Infuse into a venous catheter, Disp: , Rfl:     pantoprazole (PROTONIX) 40 mg tablet, Take 40 mg by mouth daily  , Disp: , Rfl:     predniSONE 20 mg tablet, Take 1 tablet by mouth daily (Patient taking differently: Take 10 mg by mouth daily  ), Disp: , Rfl: 0    LORazepam (ATIVAN) 0 5 mg tablet, Take 1 tablet by mouth every 8 (eight) hours as needed for anxiety for up to 10 days, Disp: 30 tablet, Rfl: 0  No current facility-administered medications for this visit  Facility-Administered Medications Ordered in Other Visits:     sodium chloride 0 9 % infusion, 20 mL/hr, Intravenous, Continuous, Aletha Hilton MD, Stopped at 02/13/18 1300      Physical Exam:  /90 (BP Location: Left arm, Patient Position: Sitting, Cuff Size: Adult)   Pulse 70   Temp 99 4 °F (37 4 °C)   Resp 16   Ht 6' 1" (1 854 m)   Wt 101 kg (222 lb 12 8 oz)   SpO2 98%   BMI 29 39 kg/m²      Physical Exam   Constitutional: He is oriented to person, place, and time  He appears well-developed and well-nourished  No distress  HENT:   Head: Normocephalic and atraumatic  Eyes: Conjunctivae are normal  No scleral icterus  Neck: Normal range of motion  Neck supple  Cardiovascular: Normal rate, regular rhythm and normal heart sounds  No murmur heard  Pulmonary/Chest: Effort normal and breath sounds normal  No respiratory distress  Abdominal: Soft  There is no tenderness  Musculoskeletal: Normal range of motion  He exhibits no edema or tenderness  Lymphadenopathy:     He has no cervical adenopathy  Neurological: He is alert and oriented to person, place, and time  No cranial nerve deficit  Skin: Skin is warm and dry  Psychiatric: He has a normal mood and affect  Vitals reviewed          Labs:  Lab Results   Component Value Date    WBC 2 80 (L) 02/12/2018    HGB 13 3 02/12/2018    HCT 39 5 02/12/2018    MCV 83 02/12/2018    PLT 65 (L) 02/12/2018     Lab Results   Component Value Date     02/12/2018    K 4 1 02/12/2018     02/12/2018    CO2 28 02/12/2018    ANIONGAP 6 02/12/2018    BUN 22 02/12/2018    CREATININE 1 20 02/12/2018    GLUCOSE 121 02/12/2018    GLUF 117 (H) 06/19/2017    CALCIUM 9 6 02/12/2018    AST 25 02/12/2018    ALT 25 02/12/2018    ALKPHOS 44 (L) 02/12/2018    PROT 5 7 (L) 02/12/2018    BILITOT 0 70 02/12/2018    EGFR 64 02/12/2018     @RESULTFAST(TSH)@    Patient voiced understanding and agreement in the above discussion  Aware to contact our office with questions/symptoms in the interim

## 2018-02-19 ENCOUNTER — HOSPITAL ENCOUNTER (OUTPATIENT)
Dept: INFUSION CENTER | Facility: CLINIC | Age: 63
Discharge: HOME | End: 2018-02-19
Payer: MEDICARE

## 2018-02-19 VITALS
DIASTOLIC BLOOD PRESSURE: 88 MMHG | SYSTOLIC BLOOD PRESSURE: 150 MMHG | TEMPERATURE: 98.2 F | RESPIRATION RATE: 16 BRPM | HEART RATE: 80 BPM

## 2018-02-19 DIAGNOSIS — C91.10 CHRONIC LYMPHOCYTIC LEUKEMIA (HCC): Primary | ICD-10-CM

## 2018-02-19 DIAGNOSIS — C91.10 CHRONIC LYMPHOCYTIC LEUKEMIA (HCC): ICD-10-CM

## 2018-02-19 LAB
ALBUMIN SERPL BCP-MCNC: 3.8 G/DL (ref 3.2–5)
ALP SERPL-CCNC: 47 U/L (ref 46–116)
ALT SERPL W P-5'-P-CCNC: 24 U/L (ref 12–78)
ANION GAP SERPL CALCULATED.3IONS-SCNC: 5 MMOL/L (ref 4–13)
ANISOCYTOSIS BLD QL SMEAR: PRESENT
AST SERPL W P-5'-P-CCNC: 24 U/L (ref 5–45)
BASOPHILS # BLD MANUAL: 0.03 THOUSAND/UL (ref 0–0.1)
BASOPHILS NFR MAR MANUAL: 1 % (ref 0–1)
BILIRUB SERPL-MCNC: 0.8 MG/DL (ref 0.2–1)
BUN SERPL-MCNC: 19 MG/DL (ref 5–25)
CALCIUM SERPL-MCNC: 9.1 MG/DL (ref 8.3–10.1)
CHLORIDE SERPL-SCNC: 107 MMOL/L (ref 98–108)
CO2 SERPL-SCNC: 28 MMOL/L (ref 21–32)
CREAT SERPL-MCNC: 1.3 MG/DL (ref 0.6–1.3)
EOSINOPHIL # BLD MANUAL: 0.14 THOUSAND/UL (ref 0–0.4)
EOSINOPHIL NFR BLD MANUAL: 5 % (ref 0–6)
ERYTHROCYTE [DISTWIDTH] IN BLOOD BY AUTOMATED COUNT: 16.1 % (ref 11.6–15.1)
GFR SERPL CREATININE-BSD FRML MDRD: 58 ML/MIN/1.73SQ M
GLUCOSE SERPL-MCNC: 106 MG/DL (ref 65–140)
HCT VFR BLD AUTO: 42.2 % (ref 36.5–49.3)
HGB BLD-MCNC: 14.1 G/DL (ref 12–17)
LYMPHOCYTES # BLD AUTO: 0.45 THOUSAND/UL (ref 0.6–4.47)
LYMPHOCYTES # BLD AUTO: 16 % (ref 14–44)
MCH RBC QN AUTO: 28.7 PG (ref 26.8–34.3)
MCHC RBC AUTO-ENTMCNC: 33.4 G/DL (ref 31.4–37.4)
MCV RBC AUTO: 86 FL (ref 82–98)
MONOCYTES # BLD AUTO: 0.25 THOUSAND/UL (ref 0–1.22)
MONOCYTES NFR BLD: 9 % (ref 4–12)
NEUTROPHILS # BLD MANUAL: 1.91 THOUSAND/UL (ref 1.85–7.62)
NEUTS BAND NFR BLD MANUAL: 4 % (ref 0–8)
NEUTS SEG NFR BLD AUTO: 64 % (ref 43–75)
PLATELET # BLD AUTO: 45 THOUSANDS/UL (ref 149–390)
PLATELET BLD QL SMEAR: ABNORMAL
PMV BLD AUTO: 12.8 FL (ref 8.9–12.7)
POTASSIUM SERPL-SCNC: 4.1 MMOL/L (ref 3.5–5.3)
PROT SERPL-MCNC: 5.6 G/DL (ref 6.4–8.2)
RBC # BLD AUTO: 4.91 MILLION/UL (ref 3.88–5.62)
SODIUM SERPL-SCNC: 140 MMOL/L (ref 136–145)
TOTAL CELLS COUNTED SPEC: 100
VARIANT LYMPHS # BLD AUTO: 1 %
WBC # BLD AUTO: 2.81 THOUSAND/UL (ref 4.31–10.16)

## 2018-02-19 PROCEDURE — 80053 COMPREHEN METABOLIC PANEL: CPT

## 2018-02-19 PROCEDURE — 85007 BL SMEAR W/DIFF WBC COUNT: CPT

## 2018-02-19 PROCEDURE — 85027 COMPLETE CBC AUTOMATED: CPT

## 2018-02-19 NOTE — PROGRESS NOTES
Spoke with Marlen Clark RN and made her aware of platelets- 36,856 today   Judeen Power is to make PATRICK Garcia aware results

## 2018-02-26 ENCOUNTER — HOSPITAL ENCOUNTER (OUTPATIENT)
Dept: INFUSION CENTER | Facility: CLINIC | Age: 63
Discharge: HOME | End: 2018-02-26
Payer: MEDICARE

## 2018-02-26 DIAGNOSIS — C91.10 CHRONIC LYMPHOCYTIC LEUKEMIA (HCC): ICD-10-CM

## 2018-02-26 LAB
ALBUMIN SERPL BCP-MCNC: 4.1 G/DL (ref 3.2–5)
ALP SERPL-CCNC: 39 U/L (ref 46–116)
ALT SERPL W P-5'-P-CCNC: 28 U/L (ref 12–78)
ANION GAP SERPL CALCULATED.3IONS-SCNC: 5 MMOL/L (ref 4–13)
ANISOCYTOSIS BLD QL SMEAR: PRESENT
AST SERPL W P-5'-P-CCNC: 26 U/L (ref 5–45)
BASOPHILS # BLD AUTO: 0.04 THOUSAND/UL (ref 0–0.1)
BASOPHILS NFR MAR MANUAL: 1 % (ref 0–1)
BILIRUB DIRECT SERPL-MCNC: 0.2 MG/DL (ref 0–0.2)
BILIRUB SERPL-MCNC: 0.9 MG/DL (ref 0.2–1)
BUN SERPL-MCNC: 20 MG/DL (ref 5–25)
CALCIUM SERPL-MCNC: 9.4 MG/DL (ref 8.3–10.1)
CHLORIDE SERPL-SCNC: 107 MMOL/L (ref 98–108)
CO2 SERPL-SCNC: 28 MMOL/L (ref 21–32)
CREAT SERPL-MCNC: 1.3 MG/DL (ref 0.6–1.3)
EOSINOPHIL # BLD AUTO: 0.07 THOUSAND/UL (ref 0–0.61)
EOSINOPHIL NFR BLD MANUAL: 2 % (ref 0–6)
ERYTHROCYTE [DISTWIDTH] IN BLOOD BY AUTOMATED COUNT: 15.5 % (ref 11.6–15.1)
GFR SERPL CREATININE-BSD FRML MDRD: 58 ML/MIN/1.73SQ M
GLUCOSE SERPL-MCNC: 102 MG/DL (ref 65–140)
HCT VFR BLD AUTO: 42 % (ref 36.5–49.3)
HGB BLD-MCNC: 14 G/DL (ref 12–17)
IGG SERPL-MCNC: 93 MG/DL (ref 700–1600)
LDH SERPL-CCNC: 311 U/L (ref 81–234)
LG PLATELETS BLD QL SMEAR: PRESENT
LYMPHOCYTES # BLD AUTO: 0.53 THOUSAND/UL (ref 0.6–4.47)
LYMPHOCYTES # BLD AUTO: 15 % (ref 14–44)
MCH RBC QN AUTO: 28.9 PG (ref 26.8–34.3)
MCHC RBC AUTO-ENTMCNC: 33.3 G/DL (ref 31.4–37.4)
MCV RBC AUTO: 87 FL (ref 82–98)
MONOCYTES # BLD AUTO: 0.28 THOUSAND/UL (ref 0–1.22)
MONOCYTES NFR BLD AUTO: 8 % (ref 4–12)
NEUTS BAND NFR BLD MANUAL: 3 % (ref 0–8)
NEUTS SEG # BLD: 2.56 THOUSAND/UL (ref 1.81–6.82)
NEUTS SEG NFR BLD AUTO: 70 % (ref 43–75)
OVALOCYTES BLD QL SMEAR: PRESENT
PLATELET # BLD AUTO: 36 THOUSANDS/UL (ref 149–390)
PLATELET BLD QL SMEAR: ABNORMAL
PMV BLD AUTO: 7.7 FL (ref 8.9–12.7)
POTASSIUM SERPL-SCNC: 3.8 MMOL/L (ref 3.5–5.3)
PROT SERPL-MCNC: 5.9 G/DL (ref 6.4–8.2)
RBC # BLD AUTO: 4.85 MILLION/UL (ref 3.88–5.62)
RETICS # AUTO: NORMAL 10*3/UL (ref 14356–105094)
RETICS # CALC: 1.37 % (ref 0.37–1.87)
SODIUM SERPL-SCNC: 140 MMOL/L (ref 136–145)
TOTAL CELLS COUNTED SPEC: 100
URATE SERPL-MCNC: 5.3 MG/DL (ref 4.2–8)
VARIANT LYMPHS # BLD AUTO: 1 % (ref 0–0)
WBC # BLD AUTO: 3.5 THOUSAND/UL (ref 4.31–10.16)
WBC NRBC COR # BLD: 3.5 THOUSAND/UL (ref 4.31–10.16)

## 2018-02-26 PROCEDURE — 82784 ASSAY IGA/IGD/IGG/IGM EACH: CPT

## 2018-02-26 PROCEDURE — 80053 COMPREHEN METABOLIC PANEL: CPT

## 2018-02-26 PROCEDURE — 85027 COMPLETE CBC AUTOMATED: CPT

## 2018-02-26 PROCEDURE — 85045 AUTOMATED RETICULOCYTE COUNT: CPT

## 2018-02-26 PROCEDURE — 84550 ASSAY OF BLOOD/URIC ACID: CPT

## 2018-02-26 PROCEDURE — 85007 BL SMEAR W/DIFF WBC COUNT: CPT

## 2018-02-26 PROCEDURE — 83615 LACTATE (LD) (LDH) ENZYME: CPT

## 2018-02-26 PROCEDURE — 82248 BILIRUBIN DIRECT: CPT

## 2018-02-26 NOTE — MISCELLANEOUS
Message  LVM to inform him that his appt  changed from 01-24-18 at 8:30am TO 01-24-18 at 1:00pm  Please inform him of this change  Active Problems    1  Abdominal pain (789 00) (R10 9)   2  Anemia (285 9) (D64 9)   3  Cellulitis of hand without finger or thumb, right (682 4) (L03 113)   4  Cellulitis of head or scalp (682 8) (L03 811)   5  Chemotherapy-induced nausea (787 02,E933 1) (R11 0,T45  1X5A)   6  Chronic kidney disease, unspecified CKD stage (585 9) (N18 9)   7  Chronic lymphocytic leukemia (204 10) (C91 10)   8  Chronic maxillary sinusitis (473 0) (J32 0)   9  Coronary artery disease (414 00) (I25 10)   10  Dyslipidemia (272 4) (E78 5)   11  Dysphagia (787 20) (R13 10)   12  Esophagitis, reflux (530 11) (K21 0)   13  Heart disease (429 9) (I51 9)   14  Hemolytic anemia (283 9) (D58 9)   15  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,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)   16  History of hemolytic anemia (V12 3) (Z86 2)   17  Hyperlipidemia (272 4) (E78 5)   18  Hypokalemia (276 8) (E87 6)   19  HZV (herpes zoster virus) post 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 19) (B02 29)   20  Joint pain (719 40) (M25 50)   21  Leukopenia (288 50) (D72 819)   22  Lower extremity edema (782 3) (R60 0)   23  Muscle Cramps (729 82)   24  Neutropenia (288 00) (D70 9)   25  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) (R76 12)   26  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,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,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)   27  Thrombocytopenia (287 5) (D69 6)   28  Ulcer of esophagus without bleeding (530 20) (K22 10)    Current Meds   1  Acyclovir 400 MG Oral Tablet; 1 tab po bid; Therapy: 04KWI8991 to (Last Rx:54Uju4467)  Requested for: 83Kne4526 Ordered   2  Aspirin 81 MG Oral Tablet Delayed Release; Therapy: 20Mab8801 to Recorded   3  BD Insulin Syringe Ultrafine 31G X 5/16" 1 ML Miscellaneous; use four daily; Therapy: 88ECF4847 to 452 8137); Last Rx:26Oct2017 Ordered   4  Benzonatate 200 MG Oral Capsule; TAKE 1 CAPSULE 3 TIMES DAILY AS NEEDED;    Therapy: 45WKI5581 to Pita Herrera)  Requested for: 73Ize0884; Last   BK:29IWP4842 Ordered   5  Carafate 1 GM/10ML Oral Suspension; TAKE 10 ML 4 TIMES DAILY; Therapy: 45RZP0350 to (Manjula Hull)  Requested for: 48PES0410; Last   Rx:05Oct2017 Ordered   6  Citalopram Hydrobromide 40 MG Oral Tablet; Therapy: 37Ocy9102 to Recorded   7  Clotrimazole 10 MG Mouth/Throat Cindy; ALLOW 1 CINDY TO SLOWLY DISSOLVE   IN MOUTH  4 TIMES A DAY; Therapy: 18PST5947 to (Evaluate:14May2017)  Requested for: 07KTU6714; Last   Rx:15Mar2017 Ordered   8  Fenofibrate 50 MG Oral Capsule; TAKE 1 CAPSULE DAILY WITH A MEAL; Therapy: (Recorded:05Apr2017) to Recorded   9  Folic Acid 1 MG Oral Tablet; Therapy: (Recorded:07Mar2014) to Recorded   10  Gabapentin 100 MG Oral Capsule; TAKE 1 CAPSULE 3 TIMES DAILY; Therapy: 68URT4682 to (Charles Quick)  Requested for: 11Xiu1768; Last    Rx:45Fzf8755 Ordered   11  Gazyva 1000 MG/40ML Intravenous Solution; Therapy: 11Sep2017 to Recorded   12  GNP Potassium Gluconate 595 (99 K) MG Oral Tablet; Take 1 tablet daily Recorded   13  Imbruvica 140 MG Oral Capsule; TAKE 2 CAPSULE Daily; Therapy: 38RSI9573 to (Evaluate:09Apr2018)  Requested for: 17DUJ0274; Last    Rx:09Jan2018 Ordered   14  Keflex 500 MG Oral Capsule (Cephalexin); Therapy: (Recorded:18Oct2017) to Recorded   15  Lantus SoloStar 100 UNIT/ML Subcutaneous Solution Pen-injector; 12 units sq qhs; Therapy: 87PEZ4182 to (Evaluate:55Ypo3483); Last Rx:09Jan2018 Ordered   16  Ondansetron 8 MG Oral Tablet Disintegrating; 1 tab PO Q 8 hr PRN nausea; Therapy: 12HNN1503 to (Last Rx:15Mar2017)  Requested for: 36QPM7449 Ordered   17  OxyCODONE HCl ER 10 MG Oral Tablet ER 12 Hour Abuse-Deterrent; TAKE 1 TABLET    EVERY 12 HOURS DAILY; Therapy: 48OLH4897 to (Evaluate:06Jan2018); Last Rx:19Hii1879 Ordered   18  OxyCONTIN 15 MG Oral Tablet ER 12 Hour Abuse-Deterrent; TAKE 1 TABLET EVERY    12 HOURS DAILY;     Therapy: 99KMU3293 to (Evaluate:05Jan2018); Last Rx:56Rgn2302 Ordered   19  Pantoprazole Sodium 40 MG Oral Tablet Delayed Release (Protonix); TAKE 1 TABLET    DAILY IN AM PRIOR TO BREAKFAST; Therapy: 75TTA4018 to (Last Rx:43Sfk4676)  Requested for: 29UGM9655 Ordered   20  Percocet 5-325 MG Oral Tablet (Oxycodone-Acetaminophen); Therapy: (Recorded:18Oct2017) to Recorded   21  Potassium Chloride ER 10 MEQ Oral Tablet Extended Release; one daily  or as advised; Therapy: 25LPZ7428 to (Margoth Ferris)  Requested for: 90WKF1947; Last    Rx:44Hwu3361 Ordered   22  PredniSONE 20 MG Oral Tablet; TAKE 1 TABLET DAILY WITH FOOD AS DIRECTED; Therapy: 86KLV9290 to (Last Rx:35Ahn8105)  Requested for: 96BRP8938 Ordered   23  SLPG Magic Mouthwash 1:1:1 maalox/diphenhydramine/lidocaine; 15 ml PO q6 prn; Therapy: 70WNF3686 to (Evaluate:59Ipv5390); Last Rx:05Oct2017 Ordered   24  TraMADol HCl - 50 MG Oral Tablet; TAKE 1 TABLET EVERY 6 HOURS AS NEEDED FOR    ONGOING PAIN;    Therapy: 96FTH2897 to (Last Rx:38Bij2312) Ordered    Allergies    1   Heparin    Signatures   Electronically signed by : Joanne Nino, ; Jan 12 2018 10:45AM EST                       (Author)

## 2018-03-01 ENCOUNTER — TELEPHONE (OUTPATIENT)
Dept: ENDOCRINOLOGY | Facility: CLINIC | Age: 63
End: 2018-03-01

## 2018-03-05 ENCOUNTER — HOSPITAL ENCOUNTER (OUTPATIENT)
Dept: INFUSION CENTER | Facility: CLINIC | Age: 63
Discharge: HOME | End: 2018-03-05
Payer: MEDICARE

## 2018-03-05 DIAGNOSIS — C91.10 CHRONIC LYMPHOCYTIC LEUKEMIA (HCC): ICD-10-CM

## 2018-03-05 LAB
ALBUMIN SERPL BCP-MCNC: 3.8 G/DL (ref 3.2–5)
ALP SERPL-CCNC: 58 U/L (ref 46–116)
ALT SERPL W P-5'-P-CCNC: 22 U/L (ref 12–78)
ANION GAP SERPL CALCULATED.3IONS-SCNC: 8 MMOL/L (ref 4–13)
ANISOCYTOSIS BLD QL SMEAR: PRESENT
AST SERPL W P-5'-P-CCNC: 24 U/L (ref 5–45)
BASOPHILS # BLD AUTO: 0 THOUSAND/UL (ref 0–0.1)
BASOPHILS NFR MAR MANUAL: 0 % (ref 0–1)
BILIRUB SERPL-MCNC: 0.7 MG/DL (ref 0.2–1)
BUN SERPL-MCNC: 15 MG/DL (ref 5–25)
CALCIUM SERPL-MCNC: 9.3 MG/DL (ref 8.3–10.1)
CHLORIDE SERPL-SCNC: 105 MMOL/L (ref 98–108)
CO2 SERPL-SCNC: 27 MMOL/L (ref 21–32)
CREAT SERPL-MCNC: 1.3 MG/DL (ref 0.6–1.3)
EOSINOPHIL # BLD AUTO: 0.22 THOUSAND/UL (ref 0–0.61)
EOSINOPHIL NFR BLD MANUAL: 6 % (ref 0–6)
ERYTHROCYTE [DISTWIDTH] IN BLOOD BY AUTOMATED COUNT: 16.8 % (ref 11.6–15.1)
GFR SERPL CREATININE-BSD FRML MDRD: 58 ML/MIN/1.73SQ M
GLUCOSE SERPL-MCNC: 110 MG/DL (ref 65–140)
HCT VFR BLD AUTO: 41.2 % (ref 36.5–49.3)
HGB BLD-MCNC: 13.6 G/DL (ref 12–17)
LYMPHOCYTES # BLD AUTO: 0.44 THOUSAND/UL (ref 0.6–4.47)
LYMPHOCYTES # BLD AUTO: 12 % (ref 14–44)
MCH RBC QN AUTO: 29.2 PG (ref 26.8–34.3)
MCHC RBC AUTO-ENTMCNC: 33.1 G/DL (ref 31.4–37.4)
MCV RBC AUTO: 88 FL (ref 82–98)
METAMYELOCYTES NFR BLD MANUAL: 2 % (ref 0–1)
MONOCYTES # BLD AUTO: 0.3 THOUSAND/UL (ref 0–1.22)
MONOCYTES NFR BLD AUTO: 8 % (ref 4–12)
NEUTS BAND NFR BLD MANUAL: 2 % (ref 0–8)
NEUTS SEG # BLD: 2.66 THOUSAND/UL (ref 1.81–6.82)
NEUTS SEG NFR BLD AUTO: 70 % (ref 43–75)
PLATELET # BLD AUTO: 50 THOUSANDS/UL (ref 149–390)
PLATELET BLD QL SMEAR: ABNORMAL
PMV BLD AUTO: 9.6 FL (ref 8.9–12.7)
POTASSIUM SERPL-SCNC: 4.1 MMOL/L (ref 3.5–5.3)
PROT SERPL-MCNC: 5.7 G/DL (ref 6.4–8.2)
RBC # BLD AUTO: 4.67 MILLION/UL (ref 3.88–5.62)
SODIUM SERPL-SCNC: 140 MMOL/L (ref 136–145)
TOTAL CELLS COUNTED SPEC: 100
WBC # BLD AUTO: 3.7 THOUSAND/UL (ref 4.31–10.16)
WBC NRBC COR # BLD: 3.7 THOUSAND/UL (ref 4.31–10.16)

## 2018-03-05 PROCEDURE — 85007 BL SMEAR W/DIFF WBC COUNT: CPT

## 2018-03-05 PROCEDURE — 80053 COMPREHEN METABOLIC PANEL: CPT

## 2018-03-05 PROCEDURE — 85027 COMPLETE CBC AUTOMATED: CPT

## 2018-03-07 ENCOUNTER — ANESTHESIA EVENT (OUTPATIENT)
Dept: GASTROENTEROLOGY | Facility: HOSPITAL | Age: 63
End: 2018-03-07
Payer: MEDICARE

## 2018-03-07 NOTE — PROGRESS NOTES
Dear Chela Martinez,  My name is General Russo and I am a Registered Nurse Care Coordinator for [address] [facility]  I am contacting you  because you were recently in the hospital   If you are interested, I am here to assist you as part of a Medicare program called [address]  I have enclosed information about this program for your review and my contact information  This is free of charge to you, as part of the program   I would call you periodically throughout the next 90 days to assist with any questions you might have, discuss any symptoms you may be having,  or get you in to see your doctor if needed  We  will discuss and review your medications and goals you may have for yourself  Please let me know if you agree to this by contacting me at [phone]      Sincerely,      Suly Cheng RN  [address] [facility]        Electronically signed by:Danii Magallanes RN  Mar 17 2017  3:22PM EST

## 2018-03-08 ENCOUNTER — HOSPITAL ENCOUNTER (OUTPATIENT)
Facility: HOSPITAL | Age: 63
Setting detail: OUTPATIENT SURGERY
Discharge: HOME | End: 2018-03-08
Attending: INTERNAL MEDICINE | Admitting: INTERNAL MEDICINE
Payer: MEDICARE

## 2018-03-08 ENCOUNTER — ANESTHESIA (OUTPATIENT)
Dept: GASTROENTEROLOGY | Facility: HOSPITAL | Age: 63
End: 2018-03-08
Payer: MEDICARE

## 2018-03-08 VITALS
SYSTOLIC BLOOD PRESSURE: 125 MMHG | WEIGHT: 215 LBS | TEMPERATURE: 97.3 F | DIASTOLIC BLOOD PRESSURE: 59 MMHG | BODY MASS INDEX: 27.59 KG/M2 | OXYGEN SATURATION: 95 % | HEIGHT: 74 IN | HEART RATE: 70 BPM | RESPIRATION RATE: 18 BRPM

## 2018-03-08 DIAGNOSIS — K21.9 GASTROESOPHAGEAL REFLUX DISEASE WITHOUT ESOPHAGITIS: ICD-10-CM

## 2018-03-08 LAB — GLUCOSE SERPL-MCNC: 97 MG/DL (ref 65–140)

## 2018-03-08 PROCEDURE — 88312 SPECIAL STAINS GROUP 1: CPT | Performed by: PATHOLOGY

## 2018-03-08 PROCEDURE — 88341 IMHCHEM/IMCYTCHM EA ADD ANTB: CPT | Performed by: PATHOLOGY

## 2018-03-08 PROCEDURE — 88305 TISSUE EXAM BY PATHOLOGIST: CPT | Performed by: PATHOLOGY

## 2018-03-08 PROCEDURE — 82948 REAGENT STRIP/BLOOD GLUCOSE: CPT

## 2018-03-08 PROCEDURE — 88342 IMHCHEM/IMCYTCHM 1ST ANTB: CPT | Performed by: PATHOLOGY

## 2018-03-08 PROCEDURE — 43239 EGD BIOPSY SINGLE/MULTIPLE: CPT | Performed by: INTERNAL MEDICINE

## 2018-03-08 RX ORDER — SODIUM CHLORIDE 9 MG/ML
125 INJECTION, SOLUTION INTRAVENOUS CONTINUOUS
Status: DISCONTINUED | OUTPATIENT
Start: 2018-03-08 | End: 2018-03-08 | Stop reason: HOSPADM

## 2018-03-08 RX ORDER — PROPOFOL 10 MG/ML
INJECTION, EMULSION INTRAVENOUS AS NEEDED
Status: DISCONTINUED | OUTPATIENT
Start: 2018-03-08 | End: 2018-03-08 | Stop reason: SURG

## 2018-03-08 RX ADMIN — PROPOFOL 150 MG: 10 INJECTION, EMULSION INTRAVENOUS at 08:47

## 2018-03-08 RX ADMIN — SODIUM CHLORIDE: 0.9 INJECTION, SOLUTION INTRAVENOUS at 08:47

## 2018-03-08 RX ADMIN — PROPOFOL 50 MG: 10 INJECTION, EMULSION INTRAVENOUS at 09:04

## 2018-03-08 RX ADMIN — PROPOFOL 100 MG: 10 INJECTION, EMULSION INTRAVENOUS at 08:57

## 2018-03-08 RX ADMIN — PROPOFOL 50 MG: 10 INJECTION, EMULSION INTRAVENOUS at 08:58

## 2018-03-08 RX ADMIN — PROPOFOL 50 MG: 10 INJECTION, EMULSION INTRAVENOUS at 09:01

## 2018-03-08 RX ADMIN — LIDOCAINE HYDROCHLORIDE 60 MG: 20 INJECTION, SOLUTION INTRAVENOUS at 08:46

## 2018-03-08 RX ADMIN — PROPOFOL 100 MG: 10 INJECTION, EMULSION INTRAVENOUS at 08:53

## 2018-03-08 NOTE — ED NOTES
Right chestwall port-a-cath accessed utilizing sterile technique with a 20 G X 0 75 inch Nielson needle  Blood return obtained and port flushed  IV NSS initiated via port at [facility] for pending procedure as instructed by Primary nurse  Pt tolerated procedure well        Sarwat Bailey RN  03/08/18 [id number]

## 2018-03-08 NOTE — H&P
History and Physical - [facility]  Rohini Cherry 60 y o  male MRN: [id number]                  HPI: Rohini Cherry is a 60y o  year old male who presents for EGD due to prior HSV esophagitis  Also with CLL and on chronic immunosuppression  REVIEW OF SYSTEMS: Per the HPI, and otherwise unremarkable  Historical Information   Past Medical History:   Diagnosis Date    CAD (coronary artery disease) 2004    Cellulitis of scalp     CKD (chronic kidney disease) stage 3, GFR 30-59 ml/min     [facility]) ([facility] )     COPD (chronic obstructive pulmonary disease) ([facility] )     Diabetes mellitus ([facility] )     H/O blood clots     Hemolytic anemia (HCC)     History of TIA (transient ischemic attack)     Hyperlipidemia     Hypertension     Multiple gastric ulcers     Myocardial infarction     Recurrent sinusitis     Thrombocytopenia (HCC)     Vertigo      Past Surgical History:   Procedure Laterality Date    ARTHROSCOPIC REPAIR ACL      lt knee    CARDIAC SURGERY      cardiac cath with stent    KNEE ARTHROSCOPY      PORTACATH PLACEMENT      AK ESOPHAGOGASTRODUODENOSCOPY TRANSORAL DIAGNOSTIC N/A 10/5/2017    Procedure: ESOPHAGOGASTRODUODENOSCOPY (EGD) with bx;  Surgeon: Ilia Ambrocio DO;  Location: [facility]; Service: Gastroenterology    AK ESOPHAGOSCOPY FLEXIBLE TRANSORAL WITH BIOPSY N/A 9/2/2016    Procedure: ESOPHAGOGASTRODUODENOSCOPY (EGD); ESOPHAGEAL DILATION; ESOPHAGEAL BIOPSY;  Surgeon: Erna Miller MD;  Location:  MAIN OR;  Service: Thoracic    TONSILLECTOMY      UPPER GASTROINTESTINAL ENDOSCOPY      x 6     Social History   History   Alcohol Use No     History   Drug Use No     History   Smoking Status    Former Smoker   Smokeless Tobacco    Never Used     Comment: pt refuses to answer question     History reviewed  No pertinent family history      Meds/Allergies     Prescriptions Prior to Admission   Medication    aspirin 81 MG tablet    acetaminophen (TYLENOL) 325 mg tablet    acyclovir (ZOVIRAX) 400 MG tablet    citalopram (CeleXA) 40 mg tablet    fenofibrate (TRIGLIDE) 50 MG tablet    folic acid (FOLVITE) 1 mg tablet    gabapentin (NEURONTIN) 300 mg capsule    glucose monitoring kit (FREESTYLE) monitoring kit    Ibrutinib 140 MG CAPS    insulin glargine (LANTUS) 100 units/mL subcutaneous injection    insulin lispro (HumaLOG) 100 units/mL injection    Lancets (FREESTYLE) lancets    multivitamin (THERAGRAN) TABS    obinutuzumab (GAZYVA) 1000 mg/40 mL SOLN    pantoprazole (PROTONIX) 40 mg tablet    predniSONE 20 mg tablet       Allergies   Allergen Reactions    Heparin Other (See Comments)     Other reaction(s): Blood Disorders  clot    Macrolides And Ketolides Other (See Comments)     Interacts with other meds he is taking       Objective     Blood pressure 153/75, pulse 72, temperature (!) 97 3 °F (36 3 °C), temperature source Tympanic, resp  rate 13, height 6' 1 5" (1 867 m), weight 97 5 kg (215 lb), SpO2 98 %  PHYSICAL EXAM    Gen: NAD  CV: RRR  CHEST: Clear  ABD: soft, NT/ND  EXT: no edema      ASSESSMENT/PLAN:  This is a 60y o  year old male here for EGD due to prior HSV esophagitis and solid food dysphagia      PLAN: EGD with possible dilation

## 2018-03-08 NOTE — OP NOTE
OPERATIVE REPORT  PATIENT NAME: Clay Yuan    :    MRN: [id number]  Pt Location: [facility] 01    SURGERY DATE: 3/8/2018    Surgeon(s) and Role:     Misael Santana,  - Primary     * Cintia Stewart - Assisting    Preop Diagnosis:  Gastroesophageal reflux disease without esophagitis [K21 9]    Post-Op Diagnosis Codes:     * Gastroesophageal reflux disease without esophagitis [K21 9]    Procedure(s) (LRB):  ESOPHAGOGASTRODUODENOSCOPY (EGD) with biopsy (N/A)    Specimen(s):  ID Type Source Tests Collected by Time Destination   1 :  Distal Esophagus Biopsy  Esophagitis  History HSV Tissue Esophagus TISSUE EXAM Félix Anaya DO 3/8/2018 0901        Estimated Blood Loss:   Minimal    Drains:       Anesthesia Type:   Choice    Operative Indications:  Gastroesophageal reflux disease without esophagitis [K21 9]      Operative Findings:  ESOPHAGOGASTRODUODENOSCOPY    PROCEDURE: EGD    SEDATION: Monitored anesthesia care, check anesthesia records    ASA Class: 3    INDICATIONS: history of CLL, chronic immunosuppression, prior HSV esophagitis     CONSENT:  Informed consent was obtained for the procedure, including sedation after explaining the risks and benefits of the procedure  Risks including but not limited to bleeding, perforation, infection, and missed lesion  PREPARATION:   Telemetry, pulse oximetry, blood pressure were monitored throughout the procedure  Patient was identified by myself both verbally and by visual inspection of ID band  DESCRIPTION:   Patient was placed in the left lateral decubitus position and was sedated with the above medication  The gastroscope was introduced in to the oropharynx and the esophagus was intubated under direct visualization  Scope was passed down the esophagus up to 2nd part of the duodenum  A careful inspection was made as the gastroscope was withdrawn, including a retroflexed view of the stomach; findings and interventions are described below  FINDINGS:    #1  Esophagus- 3 cm sliding hiatal hernia from 39 to 42 cm from the incisors, there was also Class C erosive esophagitis from 36 to 39 cm from the incisors, multiple biopsies obtained    #2  Stomach- normal stomach on direct and retroflexed views    #3  Duodenum- normal first and second part of the duodenum         IMPRESSIONS:      Class C erosive esophagitis and small hiatal hernia  RECOMMENDATIONS:     Await pathology results  Continue PPI twice daily  COMPLICATIONS:  None; patient tolerated the procedure well            DISPOSITION: PACU           CONDITION: Stable      SIGNATURE: Maura Valdez,   DATE: March 8, 2018  TIME: 9:10 AM

## 2018-03-08 NOTE — PERIOPERATIVE NURSING NOTE
Port de-accessed by Sohan Long, RN, MSN  Flushed with 10ml NSS  No Heparin instilled due to patient allergy to same

## 2018-03-08 NOTE — DISCHARGE INSTRUCTIONS
OPERATIVE REPORT  PATIENT NAME: Johnnie Dumont    :  [id number]  MRN: [id number]  Pt Location: [facility]     SURGERY DATE: 3/8/2018     Surgeon(s) and Role:     * Henry Shoemaker DO - Primary     * Yao Cortes - Assisting     Preop Diagnosis:  Gastroesophageal reflux disease without esophagitis [K21 9]     Post-Op Diagnosis Codes:     * Gastroesophageal reflux disease without esophagitis [K21 9]     Procedure(s) (LRB):  ESOPHAGOGASTRODUODENOSCOPY (EGD) with biopsy (N/A)     Specimen(s):  ID Type Source Tests Collected by Time Destination   1 :  Distal Esophagus Biopsy  Esophagitis  History HSV Tissue Esophagus TISSUE EXAM Henry Shoemaker DO 3/8/2018 0901           Estimated Blood Loss:   Minimal     Drains:        Anesthesia Type:   Choice     Operative Indications:  Gastroesophageal reflux disease without esophagitis [K21 9]        Operative Findings:  ESOPHAGOGASTRODUODENOSCOPY     PROCEDURE: EGD     SEDATION: Monitored anesthesia care, check anesthesia records     ASA Class: 3     INDICATIONS: history of CLL, chronic immunosuppression, prior HSV esophagitis      CONSENT:  Informed consent was obtained for the procedure, including sedation after explaining the risks and benefits of the procedure  Risks including but not limited to bleeding, perforation, infection, and missed lesion      PREPARATION:   Telemetry, pulse oximetry, blood pressure were monitored throughout the procedure  Patient was identified by myself both verbally and by visual inspection of ID band      DESCRIPTION:   Patient was placed in the left lateral decubitus position and was sedated with the above medication  The gastroscope was introduced in to the oropharynx and the esophagus was intubated under direct visualization  Scope was passed down the esophagus up to 2nd part of the duodenum   A careful inspection was made as the gastroscope was withdrawn, including a retroflexed view of the stomach; findings and interventions are described below       FINDINGS:     #1  Esophagus- 3 cm sliding hiatal hernia from 39 to 42 cm from the incisors, there was also Class C erosive esophagitis from 36 to 39 cm from the incisors, multiple biopsies obtained     #2  Stomach- normal stomach on direct and retroflexed views     #3  Duodenum- normal first and second part of the duodenum         IMPRESSIONS:       Class C erosive esophagitis and small hiatal hernia         RECOMMENDATIONS:      Await pathology results  Continue PPI twice daily        COMPLICATIONS:  None; patient tolerated the procedure well  DISPOSITION: PACU           CONDITION: Stable        SIGNATURE: William Gannon DO  DATE: March 8, 2018  TIME: 9:10 AM              Upper Endoscopy   WHAT YOU NEED TO KNOW:   An upper endoscopy is also called an upper gastrointestinal (GI) endoscopy, or an esophagogastroduodenoscopy (EGD)  You may feel bloated, gassy, or have some abdominal discomfort after your procedure  Your throat may be sore for 24 to 36 hours  You may burp or pass gas from air that is still inside your body  DISCHARGE INSTRUCTIONS:   Call 911 if:   · You have sudden chest pain or trouble breathing  Seek care immediately if:   · You feel dizzy or faint  · You have trouble swallowing  · You have severe throat pain  · Your bowel movements are very dark or black  · Your abdomen is hard and firm and you have severe pain  · You vomit blood  Contact your healthcare provider if:   · You feel full or bloated and cannot burp or pass gas  · You have not had a bowel movement for 3 days after your procedure  · You have neck pain  · You have a fever or chills  · You have nausea or are vomiting  · You have a rash or hives  · You have questions or concerns about your endoscopy  Relieve a sore throat:  Suck on throat lozenges or crushed ice  Gargle with a small amount of warm salt water   Mix 1 teaspoon of salt and 1 cup of warm water to make salt water    Relieve gas and discomfort from bloating:  Lie on your right side with a heating pad on your abdomen  Take short walks to help pass gas  Eat small meals until bloating is relieved  Rest after your procedure:  Do not drive or make important decisions until the day after your procedure  Return to your normal activity as directed  You can usually return to work the day after your procedure  Follow up with your healthcare provider as directed:  Write down your questions so you remember to ask them during your visits  © 2017 [address] [facility] Information is for End User's use only and may not be sold, redistributed or otherwise used for commercial purposes  All illustrations and images included in CareNotes® are the copyrighted property of A D [employer] M [employer] Inc  or [employer]ava[employer]  The above information is an  only  It is not intended as medical advice for individual conditions or treatments  Talk to your doctor, nurse or pharmacist before following any medical regimen to see if it is safe and effective for you  Esophagitis   WHAT YOU NEED TO KNOW:   What is esophagitis? Esophagitis is inflammation or irritation of the lining of the esophagus  What causes esophagitis? The most common cause is acid reflux  This means stomach acid backs up into your esophagus  The following can also cause esophagitis:  · An infection from bacteria, a virus, or a fungus    · Vomiting or a hiatal hernia    · Medicines such as aspirin or NSAIDs    · Large pills taken without enough water or right before you go to bed    · Cancer treatment, such as radiation    · A toxic object you swallowed, such as a button battery, that gets stuck in your esophagus    · Too much caffeine or acidic or spicy foods    · Cigarette smoking  What are the signs and symptoms of esophagitis? Signs and symptoms depend on the cause of your esophagitis   You may have any of the following:  · Pain in the middle of your chest that may spread to your back    · Burning or pain in your esophagus, abdominal pain, or indigestion    · Trouble swallowing, or pain when you swallow    · A feeling that something is stuck in your esophagus    · Sore throat, a cough, or hoarseness    · Gagging, drooling, or wheezing    · Mouth sores (white patches), or a bad taste in your mouth or bad breath    · Nausea or vomiting    · Feeding problems or failure to thrive (young children)  How is esophagitis diagnosed? Your healthcare provider will ask about your symptoms and when they started  Tell him if anything makes your symptoms worse or better  You may need any of the following:  · An endoscopy  is a procedure used to look at your esophagus and stomach  Your healthcare provider will use an endoscope (tube with a camera and light on the end)  He may also take a tissue sample during the procedure  The sample may show if your esophagus was damaged by what is causing your esophagitis  · A barium swallow  is done to show if your esophagus was damaged and how badly it was damaged  X-rays are taken after you swallow barium liquid  Barium liquid is used to help damage show up on the x-ray  How is esophagitis treated? The goal of treatment is to help the lining of your esophagus heal and to prevent serious complications  Treatment will depend on what is causing your esophagitis  Symptoms caused by a toxic object such as a button battery need immediate treatment  Less severe causes may not need treatment  You may need any of the following if symptoms continue or get worse:  · Medicines  may be given to fight infection or to control stomach acid  Your healthcare provider may make changes to your medicines, such as changing it to a liquid form  · An elimination diet  may help you find foods that are causing your symptoms  You will stop eating certain foods that can cause esophagitis   Your healthcare provider will tell you to start eating them again one at a time  Each time you do not have symptoms, you will start eating another food from the list  Any food that does cause symptoms may be causing your esophagitis  · Surgery  may be needed if other treatments do not work  Part of your stomach can be wrapped to cover the valve between your stomach and esophagus  This helps prevent acid from backing up into your esophagus  What can I do to manage or prevent esophagitis? · Do not smoke  Nicotine and other chemicals in cigarettes and cigars can cause blood vessel and lung damage  Ask your healthcare provider for information if you currently smoke and need help to quit  E-cigarettes or smokeless tobacco still contain nicotine  Talk to your healthcare provider before you use these products  · Do not drink alcohol  Alcohol can irritate your esophagus  Talk to your healthcare provider if you need help to stop drinking  · Limit or do not eat foods that can lead to esophagitis  Foods such as oranges and salsa can irritate your esophagus  Caffeine and chocolate can cause acid reflux  High-fat and fried foods make your stomach digest food more slowly  This increases the amount of stomach acid your esophagus is exposed to  Eat small meals, and drink water with your meals  Soft foods such as yogurt and applesauce may help soothe your throat  Do not eat for at least 3 hours before you go to bed  · Keep batteries and similar objects out of the reach of children  Babies often put items in their mouths to explore them  Button batteries are easy to swallow and can cause serious damage  Keep the battery covers of electronic devices such as remote controls taped closed  Store all batteries and toxic materials where children cannot get to them  Use childproof locks to keep children away from dangerous materials  · Drink more liquid when you take pills  Drink a full glass of water when you take your pills   Ask your healthcare provider if you can take your pills at least an hour before you go to bed  · Prevent acid reflux  Do not bend over unless it is necessary  Acid may back up into your esophagus when you bend over  If possible, keep the head of your bed elevated while you sleep  This will help keep acid from backing up  Manage stress  Stress can make your symptoms worse or cause stomach acid to back up  Call 911 for any of the following:   · You have chest pain that does not go away within a few minutes or gets worse  When should I seek immediate care? · You feel like you have food stuck in your throat and you cannot cough it out  When should I contact my healthcare provider? · You have new or worsening symptoms, even after treatment  · You have questions or concerns about your condition or care  CARE AGREEMENT:   You have the right to help plan your care  Learn about your health condition and how it may be treated  Discuss treatment options with your caregivers to decide what care you want to receive  You always have the right to refuse treatment  The above information is an  only  It is not intended as medical advice for individual conditions or treatments  Talk to your doctor, nurse or pharmacist before following any medical regimen to see if it is safe and effective for you  © 2017 [address] [employer] Information is for End User's use only and may not be sold, redistributed or otherwise used for commercial purposes  All illustrations and images included in CareNotes® are the copyrighted property of [employer] M [employer]  or [employer]ilian Tavares  Hiatal Hernia   WHAT YOU NEED TO KNOW:   What is a hiatal hernia? A hiatal hernia is a condition that causes part of your stomach to bulge through the hiatus (small opening) in your diaphragm  The part of the stomach may move up and down, or it may get trapped above the diaphragm  What increases my risk for a hiatal hernia?   The exact cause of a hiatal hernia is not known  Mason Marie may have been born with a large hiatus  The following may increase your risk of a hiatal hernia:  · Obesity    · Older age    · Medical conditions such as diverticulosis or esophagitis    · Previous surgery of the esophagus or stomach or trauma such as from a motor vehicle accident  What are the types of hiatal hernia? · Type I (sliding hiatal hernia): A portion of the stomach slides in and out of the hiatus  This type is the most common and usually causes gastroesophageal reflux disease (GERD)  GERD occurs when the esophageal sphincter does not close properly and causes acid reflux  The esophageal sphincter is the lower muscle of the esophagus  · Type II (paraesophageal hiatal hernia):  Type II hiatal hernia forms when a part of the stomach squeezes through the hiatus and lies next to the esophagus  · Type III (combined):  Type III hiatal hernia is a combination of a sliding and a paraesophageal hiatal hernia  · Type IV (complex paraesophageal hiatal hernia): The whole stomach, the small and large bowels, spleen, pancreas, or liver is pushed up into the chest   What are the signs and symptoms of a hiatal hernia? The most common symptom is heartburn  This usually occurs after meals and spreads to your neck, jaw, or shoulder  You may have no signs or symptoms, or you may have any of the following:  · Abdominal pain, especially in the area just above your navel    · Bitter or acid taste in your mouth    · Trouble swallowing    · Coughing or hoarseness    · Chest pain or shortness of breath that occurs after eating    · Frequent burping or hiccups    · Uncomfortable feeling of fullness after eating  How is a hiatal hernia diagnosed? · An upper GI series test  includes x-rays of your esophagus, stomach, and your small intestines  It is also called a barium swallow test  You will be given barium (a chalky liquid) to drink before the pictures are taken   This liquid helps your stomach and intestines show up better on the x-rays  An upper GI series can show if you have an ulcer, a blocked intestine, or other problems  · An endoscopy  uses a scope to see the inside of your digestive tract  A scope is a long, bendable tube with a light on the end of it  A camera may be hooked to the scope to take pictures  How is a hiatal hernia treated? Treatment depends on the type of hiatal hernia you have and on your symptoms  You may not need any treatment  You may need any of the following:  · Medicines  may be given to relieve heartburn symptoms  These medicines help to decrease or block stomach acid  You may also be given medicines that help to tighten the esophageal sphincter  · Surgery  may be done when medicines cannot control your symptoms, or other problems are present  Your healthcare provider may also suggest surgery depending on the type of hernia you have  Your healthcare provider can put your stomach back into its normal location  He may make the hiatus (hole) smaller and anchor your stomach in your abdomen  Fundoplication is a surgery that wraps the upper part of the stomach around the esophageal sphincter to strengthen it  How can I manage symptoms? The following nutrition and lifestyle changes may be recommended to relieve symptoms of heartburn  · Avoid foods that make your symptoms worse  These may include spicy foods, fruit juices, alcohol, caffeine, chocolate, and mint  · Eat several small meals during the day  Small meals give your stomach less food to digest     · Avoid lying down and bending forward after you eat  Do not eat meals 2 to 3 hours before bedtime  This decreases your risk for reflux  · Maintain a healthy weight  If you are overweight, weight loss may help relieve your symptoms  · Sleep with your head elevated  at least 6 inches  · Do not smoke  Smoking can increase your symptoms of heartburn  When should I seek immediate care?    · You have severe abdominal pain  · You try to vomit but nothing comes out (retching)  · You have severe chest pain and sudden trouble breathing  · Your bowel movements are black or bloody  · Your vomit looks like coffee grounds or has blood in it  When should I contact my healthcare provider? · Your symptoms are getting worse  · You have nausea, and you are vomiting  · You are losing weight without trying  · You have questions or concerns about your condition or care  CARE AGREEMENT:   You have the right to help plan your care  Learn about your health condition and how it may be treated  Discuss treatment options with your caregivers to decide what care you want to receive  You always have the right to refuse treatment  The above information is an  only  It is not intended as medical advice for individual conditions or treatments  Talk to your doctor, nurse or pharmacist before following any medical regimen to see if it is safe and effective for you  © 2017 [address] [employer] Information is for End User's use only and may not be sold, redistributed or otherwise used for commercial purposes  All illustrations and images included in CareNotes® are the copyrighted property of [employer] M [employer]  or [employer]ilian Tavares

## 2018-03-08 NOTE — ANESTHESIA PREPROCEDURE EVALUATION
Review of Systems/Medical History  Patient summary reviewed  Chart reviewed  No history of anesthetic complications     Cardiovascular  Hyperlipidemia, Hypertension , Past MI > 6 months, CAD, CAD status: 1VD, Cardiac stents > 1 year    Pulmonary  COPD mild- PRN medicaiton ,        GI/Hepatic  Dysphagia, Dysphagia type: solids  PUD, GERD , Esophageal disease benign esophageal stricture,             Endo/Other  Diabetes well controlled type 2 Oral agent,      GYN       Hematology  Anemia ,     Musculoskeletal  Negative musculoskeletal ROS        Neurology    Headaches,    Psychology   Negative psychology ROS              Physical Exam    Airway    Mallampati score: II  TM Distance: >3 FB  Neck ROM: full     Dental   upper dentures,     Cardiovascular  Rhythm: regular, Rate: normal, Cardiovascular exam normal    Pulmonary  Pulmonary exam normal     Other Findings        Anesthesia Plan  ASA Score- 3     Anesthesia Type- IV sedation with anesthesia with ASA Monitors  Additional Monitors:   Airway Plan:     Comment: USE VAP FOR ACCESS  Plan Factors-Patient not instructed to abstain from smoking on day of procedure  Patient did not smoke on day of surgery  Induction- intravenous  Postoperative Plan-     Informed Consent- Anesthetic plan and risks discussed with patient and spouse  I personally reviewed this patient with the CRNA  Discussed and agreed on the Anesthesia Plan with the CRNA  Yoselin Henderson

## 2018-03-12 ENCOUNTER — HOSPITAL ENCOUNTER (OUTPATIENT)
Dept: INFUSION CENTER | Facility: CLINIC | Age: 63
Discharge: HOME | End: 2018-03-12
Payer: MEDICARE

## 2018-03-12 LAB
ALBUMIN SERPL BCP-MCNC: 4.1 G/DL (ref 3.2–5)
ALP SERPL-CCNC: 42 U/L (ref 46–116)
ALT SERPL W P-5'-P-CCNC: 26 U/L (ref 12–78)
ANION GAP SERPL CALCULATED.3IONS-SCNC: 10 MMOL/L (ref 4–13)
ANISOCYTOSIS BLD QL SMEAR: PRESENT
AST SERPL W P-5'-P-CCNC: 23 U/L (ref 5–45)
BASOPHILS # BLD AUTO: 0 THOUSAND/UL (ref 0–0.1)
BASOPHILS NFR MAR MANUAL: 0 % (ref 0–1)
BILIRUB SERPL-MCNC: 0.8 MG/DL (ref 0.2–1)
BUN SERPL-MCNC: 20 MG/DL (ref 5–25)
CALCIUM SERPL-MCNC: 9.4 MG/DL (ref 8.3–10.1)
CHLORIDE SERPL-SCNC: 108 MMOL/L (ref 98–108)
CO2 SERPL-SCNC: 28 MMOL/L (ref 21–32)
CREAT SERPL-MCNC: 1.1 MG/DL (ref 0.6–1.3)
EOSINOPHIL # BLD AUTO: 0.03 THOUSAND/UL (ref 0–0.61)
EOSINOPHIL NFR BLD MANUAL: 1 % (ref 0–6)
ERYTHROCYTE [DISTWIDTH] IN BLOOD BY AUTOMATED COUNT: 16.1 % (ref 11.6–15.1)
GFR SERPL CREATININE-BSD FRML MDRD: 71 ML/MIN/1.73SQ M
GLUCOSE SERPL-MCNC: 96 MG/DL (ref 65–140)
HCT VFR BLD AUTO: 42.4 % (ref 36.5–49.3)
HGB BLD-MCNC: 13.7 G/DL (ref 12–17)
LG PLATELETS BLD QL SMEAR: PRESENT
LYMPHOCYTES # BLD AUTO: 0.53 THOUSAND/UL (ref 0.6–4.47)
LYMPHOCYTES # BLD AUTO: 19 % (ref 14–44)
MCH RBC QN AUTO: 28.4 PG (ref 26.8–34.3)
MCHC RBC AUTO-ENTMCNC: 32.3 G/DL (ref 31.4–37.4)
MCV RBC AUTO: 88 FL (ref 82–98)
MONOCYTES # BLD AUTO: 0.28 THOUSAND/UL (ref 0–1.22)
MONOCYTES NFR BLD AUTO: 10 % (ref 4–12)
NEUTS BAND NFR BLD MANUAL: 1 % (ref 0–8)
NEUTS SEG # BLD: 1.96 THOUSAND/UL (ref 1.81–6.82)
NEUTS SEG NFR BLD AUTO: 69 % (ref 43–75)
OVALOCYTES BLD QL SMEAR: PRESENT
PLATELET # BLD AUTO: 49 THOUSANDS/UL (ref 149–390)
PLATELET BLD QL SMEAR: ABNORMAL
PMV BLD AUTO: 8.4 FL (ref 8.9–12.7)
POTASSIUM SERPL-SCNC: 3.8 MMOL/L (ref 3.5–5.3)
PROT SERPL-MCNC: 5.7 G/DL (ref 6.4–8.2)
RBC # BLD AUTO: 4.81 MILLION/UL (ref 3.88–5.62)
SODIUM SERPL-SCNC: 146 MMOL/L (ref 136–145)
TOTAL CELLS COUNTED SPEC: 100
WBC # BLD AUTO: 2.8 THOUSAND/UL (ref 4.31–10.16)
WBC NRBC COR # BLD: 2.8 THOUSAND/UL (ref 4.31–10.16)

## 2018-03-12 PROCEDURE — 80053 COMPREHEN METABOLIC PANEL: CPT | Performed by: INTERNAL MEDICINE

## 2018-03-12 PROCEDURE — 85027 COMPLETE CBC AUTOMATED: CPT | Performed by: INTERNAL MEDICINE

## 2018-03-12 PROCEDURE — 85007 BL SMEAR W/DIFF WBC COUNT: CPT | Performed by: INTERNAL MEDICINE

## 2018-03-12 NOTE — PROGRESS NOTES
Received message platelets [phone]  Called office of Dr Romana Herter and spoke with Noel Mishra RN  No new orders received at this time

## 2018-03-12 NOTE — PROGRESS NOTES
Labs obtained as ordered via port a cath  Excellent blood return noted  Future appointments reviewed   Declined AVS

## 2018-03-12 NOTE — PLAN OF CARE
Problem: PAIN - ADULT  Goal: Verbalizes/displays adequate comfort level or baseline comfort level  Interventions:  - Encourage patient to monitor pain and request assistance  - Assess pain using appropriate pain scale  - Administer analgesics based on type and severity of pain and evaluate response  - Implement non-pharmacological measures as appropriate and evaluate response  - Consider cultural and social influences on pain and pain management  - Notify physician/advanced practitioner if interventions unsuccessful or patient reports new pain  Outcome: Progressing      Problem: INFECTION - ADULT  Goal: Absence or prevention of progression during hospitalization  INTERVENTIONS:  - Assess and monitor for signs and symptoms of infection  - Monitor lab/diagnostic results  - Monitor all insertion sites, i e  indwelling lines, tubes, and drains  - Monitor endotracheal (as able) and nasal secretions for changes in amount and color  - [place] appropriate cooling/warming therapies per order  - Administer medications as ordered  - Instruct and encourage patient and family to use good hand hygiene technique  - Identify and instruct in appropriate isolation precautions for identified infection/condition  Outcome: Progressing    Goal: Absence of fever/infection during neutropenic period  INTERVENTIONS:  - Monitor WBC  - Implement neutropenic guidelines  Outcome: Progressing      Problem: Knowledge Deficit  Goal: Patient/family/caregiver demonstrates understanding of disease process, treatment plan, medications, and discharge instructions  Complete learning assessment and assess knowledge base    Interventions:  - Provide teaching at level of understanding  - Provide teaching via preferred learning methods  Outcome: Progressing

## 2018-03-13 ENCOUNTER — TELEPHONE (OUTPATIENT)
Dept: ENDOCRINOLOGY | Facility: CLINIC | Age: 63
End: 2018-03-13

## 2018-03-14 ENCOUNTER — OFFICE VISIT (OUTPATIENT)
Dept: HEMATOLOGY ONCOLOGY | Facility: CLINIC | Age: 63
End: 2018-03-14
Payer: MEDICARE

## 2018-03-14 VITALS
TEMPERATURE: 97.6 F | HEIGHT: 73 IN | RESPIRATION RATE: 16 BRPM | OXYGEN SATURATION: 98 % | SYSTOLIC BLOOD PRESSURE: 150 MMHG | WEIGHT: 223.2 LBS | HEART RATE: 77 BPM | DIASTOLIC BLOOD PRESSURE: 90 MMHG | BODY MASS INDEX: 29.58 KG/M2

## 2018-03-14 DIAGNOSIS — C91.10 CLL (CHRONIC LYMPHOCYTIC LEUKEMIA) (HCC): Primary | ICD-10-CM

## 2018-03-14 DIAGNOSIS — C91.10 CHRONIC LYMPHATIC LEUKEMIA (HCC): Primary | ICD-10-CM

## 2018-03-14 DIAGNOSIS — D59.19 OTHER AUTOIMMUNE HEMOLYTIC ANEMIAS: ICD-10-CM

## 2018-03-14 PROCEDURE — 99214 OFFICE O/P EST MOD 30 MIN: CPT | Performed by: PHYSICIAN ASSISTANT

## 2018-03-14 NOTE — LETTER
March 14, 2018     [address]  [address] [facility] [address]  [place]   [phone]  [id number]    Patient: Luis Szymanski   YOB: 1955   Date of Visit: 3/14/2018       Dear Dr Renetta Kaufman: Thank you for referring Cortes  to me for evaluation  Below are my notes for this consultation  If you have questions, please do not hesitate to call me  I look forward to following your patient along with you  Sincerely,        Charles Morrison PA-C        CC: No Recipients  Charles Morrison PA-C  3/14/2018 10:14 AM  Sign at close encounter  Hematology/Oncology Outpatient Follow-up  Luis Szymanski 60 y o  male 1955 [id number]    Date:  3/14/2018      Assessment and Plan:  1  CLL (chronic lymphocytic leukemia) (HCC)  - Patient is on Imbruvica 140 mg PO daily and Gazyva 1000 mg/m2 every 4 weeks  - CBC has showed acceptable platelet counts in 84-19 range  Hemoglobin is normal in 13-14 range  Neutrophils remain normal as well  - Plan to continue on the above plan  2  Other autoimmune hemolytic anemias ([facility] [address] )  - Patient is on prednisone 10 mg PO daily  He is taking multivitamin  Advise he needs minimum 600 mg calcium and 1000 IU of vitamin D  Written information provided regarding this  - He asked questions in regards to decreased prednisone dose  Discussed that since he has been doing so well recently, would prefer not to change  Also, his blood sugars have been well managed  He only is requiring basal insulin at this time as well  - Retic count was normal on 2/26/18    - Continue prednisone 10 mg PO daily  HPI:  On 3/20/17 patient found to have hemoglobin of 6 2, ,000  He was admitted to [address] [address] for blood transfusion and plan to transfer to [address] [place] [address]  On 3/20/17 WBC count 195,000, hemoglobin 4 9, platelet count 65  Direct coomb's was positive with undetectable haptoglobin   He was given 2 units of PRBCs, Solu-Medrol 1 g ×3 days and IVIG 1 g/kg daily ×3 days  His hemoglobin continued to drop  Bone marrow biopsy on 3/21 showed marrow cellularity of greater than 95% almost replaced by small lymphocytes, lambda light chain restricted, CD20 positive, CD19 positive, CD23 positive partially expressing CD11c and CD25 and CD5 positive and negative for CD 79 and CD38  He was treated with single dose of chlorambucil to control his CLL   3/22 second dose of chlorambucil, also Rituxan 375 mg/M ² autoimmune hemolytic anemia  WBC continued to rise, 266,000  Patient initiated with Venetoclax 20 mg daily  Tumor lysis monitored every 8 hours; given aggressive hydration and Lasix and remained euvolemic  3/24WBC dropped to 112,000  3/25- WBC 63,000  3/26WBC 15,000, , platelet count 18,312  Patient became febrile, 101 3  The cefepime initiated Venetoclax held  3/28patient fell from bed, CT head negative  ANC 1200, cefepime discontinued and Levaquin 75 mg daily started sliding 3/29 given second dose of rituximab 375 mg/m ²   3/30WBC meg to 14 5 thousand, platelets 15,213, Venetoclax restarted 10 mg every other day  3/31 WBC 4 4, , platelet count 98,624  4/14/4: WBC maintained less than 10,000, ANC and platelet count meg  He was discharged on Venetoclax 10 mg every other day  He was instructed to discontinue simvastatin, Ranexa, aspirin, metoprolol 50 mg q  day, losartan 50 mg q  day, Plavix 75 mg q  day, folic acid 321 µg b i d , prednisone 60 mg, allopurinol  He was advised to continue Levaquin 750 mg daily for 10 days, Lexapro 10 mg daily, fenofibrate 50 mg daily, gabapentin 100 mg twice daily, Protonix 40 mg daily     Imaging studies performed at [facility]:  Patient had echocardiogram while inpatient at [facility] on 3/21  This study was unremarkable    CT chest abdomen pelvis without contrast on 3/24 showed stable pulmonary nodules, patchy groundglass densities of lower lobes previously identified and which may represent volume loss or early infiltrate  Persistent diffuse bronchial wall thickening suggesting acute/chronic bronchitis changes  No bronchiectasis  No masses  Stable lymphadenopathy of mediastinum  Stable axillary lymphadenopathy  Splenomegaly 23 9 cm  Extensive lymphadenopathy throughout the entire retroperitoneal, small bowel mesentery, and pelvis  Largest lymph node in right lower quadrant measured 4 6 x 4 8  Stable enlarged left para-aortic lymph node measuring 6 2 x 6 8   4/12/17: Patient received one dose of Rituxan on 4/7/17  Venetoclax 10 mg every other day 4/5/17 - 4/9/17  Venetoclax 10 mg every day beginning 4/10/17  Monitoring CBC 3 times per week  4/28/17: patient had follow-up appointment at [facility] with Dr Coco Carter  She recommended to slowly increase dose of veneteclax  Also, she recommended as he has had numerous treatments with Rituxan, if antibody directed therapy becomes indicated in the future recommendation was with [facility]  She will be seeing her on a p r n  Basis      July 2017- Hemolysis  Disease not under control with veneteclax  Started prednisone 60 mg daily, folic acid, IBRUTINIB 217 mg daily and Gazyva       Sept 2017- 9/18-9/20 patient was admitted due to uncontrolled hyperglycemia with new onset DM type II due to chronic prednisone use for CLL/hemolytic anemia; also found to have profound neutropenia  Ibrutinib dose was reduced to 280 mg daily      October 2017- 10/7/17 - 10/14/17 patient was admitted due to cellulitis on his scalp     Dec 2017- Kourtney Minerva decreased to 140 mg PO daily due to thrombocytopenia     Interval history: weight improved, appetite good  States he is feeling well  Diffuse arthralgias in AM  Had EGD with GI in March 2018, pathology is still pending  ROS: Review of Systems   Constitutional: Negative for appetite change, chills, fever and unexpected weight change  HENT: Negative for mouth sores and nosebleeds      Respiratory: Negative for cough, chest tightness, shortness of breath and wheezing  Cardiovascular: Negative for chest pain, palpitations and leg swelling  Gastrointestinal: Negative for abdominal pain, blood in stool, constipation, diarrhea, nausea and vomiting  Genitourinary: Negative for difficulty urinating, dysuria and hematuria  Musculoskeletal: Positive for arthralgias  Negative for joint swelling and myalgias  Skin: Negative  Neurological: Negative for dizziness, weakness, light-headedness, numbness and headaches  Hematological: Negative  Psychiatric/Behavioral: Negative  Past Medical History:   Diagnosis Date    CAD (coronary artery disease) 2004    Cellulitis of scalp     CKD (chronic kidney disease) stage 3, GFR 30-59 ml/min     CLL [facility]ch[facility]ocytic leukemia) ([facility] [address] )     [facility]ch[facility]     Diabetes mellitus ([facility] )     H/O blood clots     Hemolytic anemia (HCC)     History of TIA (transient ischemic attack)     Hyperlipidemia     Hypertension     Multiple gastric ulcers     Myocardial infarction     Recurrent sinusitis     Thrombocytopenia (HCC)     Vertigo        Past Surgical History:   Procedure Laterality Date    ARTHROSCOPIC REPAIR ACL      lt knee    CARDIAC SURGERY      cardiac cath with stent    KNEE ARTHROSCOPY      PORTACATH PLACEMENT      LA ESOPHAGOGASTRODUODENOSCOPY TRANSORAL DIAGNOSTIC N/A 10/5/2017    Procedure: ESOPHAGOGASTRODUODENOSCOPY (EGD) with bx;  Surgeon: Gela Knutson DO;  Location: [facility]; Service: Gastroenterology    LA ESOPHAGOGASTRODUODENOSCOPY TRANSORAL DIAGNOSTIC N/A 3/8/2018    Procedure: ESOPHAGOGASTRODUODENOSCOPY (EGD) with biopsy;  Surgeon: Gela Knutson DO;  Location: [facility];   Service: Gastroenterology    LA ESOPHAGOSCOPY FLEXIBLE TRANSORAL WITH BIOPSY N/A 9/2/2016    Procedure: ESOPHAGOGASTRODUODENOSCOPY (EGD); ESOPHAGEAL DILATION; ESOPHAGEAL BIOPSY;  Surgeon: Jose Eaton MD;  Location: [facility];  Service: Thoracic  TONSILLECTOMY      UPPER GASTROINTESTINAL ENDOSCOPY      x 6       Social History     Social History    Marital status: /Civil Union     Spouse name: N/A    Number of children: N/A    Years of education: N/A     Social History Main Topics    Smoking status: Former Smoker    Smokeless tobacco: Never Used      Comment: pt refuses to answer question    Alcohol use No    Drug use: No    Sexual activity: Not Asked     Other Topics Concern    None     Social History Narrative    None       No family history on file      Allergies   Allergen Reactions    Heparin Other (See Comments)     Other reaction(s): Blood Disorders  clot    Macrolides And Ketolides Other (See Comments)     Interacts with other meds he is taking         Current Outpatient Prescriptions:     acetaminophen (TYLENOL) 325 mg tablet, Take 2 tablets by mouth every 6 (six) hours as needed for fever (fever > 101 4), Disp: 60 tablet, Rfl: 0    acyclovir (ZOVIRAX) 400 MG tablet, Take 400 mg by mouth 2 (two) times a day, Disp: , Rfl:     aspirin 81 MG tablet, Take 81 mg by mouth every other day Every other day , Disp: , Rfl:     citalopram (CeleXA) 40 mg tablet, Take 40 mg by mouth daily, Disp: , Rfl:     fenofibrate (TRIGLIDE) 50 MG tablet, Take 134 mg by mouth daily  , Disp: , Rfl:     folic acid (FOLVITE) 1 mg tablet, Take 1 mg by mouth daily, Disp: , Rfl:     gabapentin (NEURONTIN) 300 mg capsule, Take 2 capsules by mouth 3 (three) times a day (Patient taking differently: Take 600 mg by mouth daily  ), Disp: 90 capsule, Rfl: 0    glucose monitoring kit (FREESTYLE) monitoring kit, 1 each by Does not apply route as needed ( ) E 11 9, Disp: 1 each, Rfl: 0    Ibrutinib 140 MG CAPS, Take 140 mg by mouth daily  , Disp: , Rfl:     insulin glargine (LANTUS) 100 units/mL subcutaneous injection, Inject 15 Units under the skin every morning (Patient taking differently: Inject 12 Units under the skin every morning  ), Disp: 10 mL, Rfl: 0    insulin lispro (HumaLOG) 100 units/mL injection, Inject 10 Units under the skin 3 (three) times a day with meals, Disp: , Rfl: 0    Lancets (FREESTYLE) lancets, Use as instructed--test 2 times a day  E 11 9, Disp: 100 each, Rfl: 0    multivitamin (THERAGRAN) TABS, Take 1 tablet by mouth daily, Disp: , Rfl:     obinutuzumab (GAZYVA) 1000 mg/40 mL SOLN, Infuse into a venous catheter, Disp: , Rfl:     pantoprazole (PROTONIX) 40 mg tablet, Take 40 mg by mouth daily  , Disp: , Rfl:     predniSONE 20 mg tablet, Take 1 tablet by mouth daily (Patient taking differently: Take 10 mg by mouth daily  ), Disp: , Rfl: 0    VENTOLIN  (90 Base) MCG/ACT inhaler, INHALE 2 PUFFS 4 TIMES A DAY AS NEEDED, Disp: , Rfl: 3      Physical Exam:  /90 (BP Location: Left arm, Patient Position: Sitting, Cuff Size: Adult)   Pulse 77   Temp 97 6 °F (36 4 °C)   Resp 16   Ht 6' 1" (1 854 m)   Wt 101 kg (223 lb 3 2 oz)   SpO2 98%   BMI 29 45 kg/m²      Physical Exam   Constitutional: He is oriented to person, place, and time  He appears well-developed and well-nourished  No distress  HENT:   Head: Normocephalic and atraumatic  Eyes: Conjunctivae are normal  No scleral icterus  Neck: Normal range of motion  Neck supple  Cardiovascular: Normal rate, regular rhythm and normal heart sounds  No murmur heard  Pulmonary/Chest: Effort normal and breath sounds normal  No respiratory distress  Abdominal: Soft  There is no tenderness  Musculoskeletal: Normal range of motion  He exhibits no edema or tenderness  Lymphadenopathy:     He has no cervical adenopathy  Neurological: He is alert and oriented to person, place, and time  No cranial nerve deficit  Skin: Skin is warm and dry  Psychiatric: He has a normal mood and affect  Vitals reviewed      Labs:  Lab Results   Component Value Date    WBC 2 80 (L) 03/12/2018    HGB 13 7 03/12/2018    HCT 42 4 03/12/2018    MCV 88 03/12/2018    PLT 49 (LL) 03/12/2018 Lab Results   Component Value Date     (H) 03/12/2018    K 3 8 03/12/2018     03/12/2018    CO2 28 03/12/2018    ANIONGAP 10 03/12/2018    BUN 20 03/12/2018    CREATININE 1 10 03/12/2018    GLUCOSE 96 03/12/2018    GLUF 117 (H) 06/19/2017    CALCIUM 9 4 03/12/2018    AST 23 03/12/2018    ALT 26 03/12/2018    ALKPHOS 42 (L) 03/12/2018    PROT 5 7 (L) 03/12/2018    BILITOT 0 80 03/12/2018    EGFR 71 03/12/2018     @RESULTFAST(TSH)@    Patient voiced understanding and agreement in the above discussion  Aware to contact our office with questions/symptoms in the interim

## 2018-03-14 NOTE — PROGRESS NOTES
Hematology/Oncology Outpatient Follow-up  Carisa Moreno 60 y o  male [id number]    Date:  3/14/2018      Assessment and Plan:  1  CLL (chronic lymphocytic leukemia) (HCC)  - Patient is on Imbruvica 140 mg PO daily and Gazyva 1000 mg/m2 every 4 weeks  - CBC has showed acceptable platelet counts in 17-06 range  Hemoglobin is normal in 13-14 range  Neutrophils remain normal as well  - Plan to continue on the above plan  2  Other autoimmune hemolytic anemias ([facility] [address] )  - Patient is on prednisone 10 mg PO daily  He is taking multivitamin  Advise he needs minimum 600 mg calcium and 1000 IU of vitamin D  Written information provided regarding this  - He asked questions in regards to decreased prednisone dose  Discussed that since he has been doing so well recently, would prefer not to change  Also, his blood sugars have been well managed  He only is requiring basal insulin at this time as well  - Retic count was normal on 2/26/18    - Continue prednisone 10 mg PO daily  HPI:  On 3/20/17 patient found to have hemoglobin of 6 2, ,000  He was admitted to [facility] for blood transfusion and plan to transfer to [address] [place] [address]  On 3/20/17 WBC count 195,000, hemoglobin 4 9, platelet count 65  Direct coomb's was positive with undetectable haptoglobin  He was given 2 units of PRBCs, Solu-Medrol 1 g ×3 days and IVIG 1 g/kg daily ×3 days  His hemoglobin continued to drop  Bone marrow biopsy on 3/21 showed marrow cellularity of greater than 95% almost replaced by small lymphocytes, lambda light chain restricted, CD20 positive, CD19 positive, CD23 positive partially expressing CD11c and CD25 and CD5 positive and negative for CD 79 and CD38  He was treated with single dose of chlorambucil to control his CLL   3/22 second dose of chlorambucil, also Rituxan 375 mg/M ² autoimmune hemolytic anemia  WBC continued to rise, 266,000    Patient initiated with Venetoclax 20 mg daily  Tumor lysis monitored every 8 hours; given aggressive hydration and Lasix and remained euvolemic  3/24-WBC dropped to 112,000  3/25- WBC 63,000  3/26-WBC 15,000, , platelet count 71,668  Patient became febrile, 101 3  The cefepime initiated Venetoclax held  3/28-patient fell from bed, CT head negative  ANC 1200, cefepime discontinued and Levaquin 75 mg daily started sliding 3/29 given second dose of rituximab 375 mg/m ²   3/30-WBC meg to 14 5 thousand, platelets 06,377, Venetoclax restarted 10 mg every other day  3/31 WBC 4 4, , platelet count 48,205  4/1-4/4: WBC maintained less than 10,000, ANC and platelet count meg  He was discharged on Venetoclax 10 mg every other day  He was instructed to discontinue simvastatin, Ranexa, aspirin, metoprolol 50 mg q  day, losartan 50 mg q  day, Plavix 75 mg q  day, folic acid 109 µg b i d , prednisone 60 mg, allopurinol  He was advised to continue Levaquin 750 mg daily for 10 days, Lexapro 10 mg daily, fenofibrate 50 mg daily, gabapentin 100 mg twice daily, Protonix 40 mg daily     Imaging studies performed at [facility]:  Patient had echocardiogram while inpatient at [facility] on 3/21  This study was unremarkable  CT chest abdomen pelvis without contrast on 3/24 showed stable pulmonary nodules, patchy groundglass densities of lower lobes previously identified and which may represent volume loss or early infiltrate  Persistent diffuse bronchial wall thickening suggesting acute/chronic bronchitis changes  No bronchiectasis  No masses  Stable lymphadenopathy of mediastinum  Stable axillary lymphadenopathy  Splenomegaly 23 9 cm  Extensive lymphadenopathy throughout the entire retroperitoneal, small bowel mesentery, and pelvis  Largest lymph node in right lower quadrant measured 4 6 x 4 8  Stable enlarged left para-aortic lymph node measuring 6 2 x 6 8   4/12/17: Patient received one dose of Rituxan on 4/7/17   Venetoclax 10 mg every other day 4/5/17 - 4/9/17  Venetoclax 10 mg every day beginning 4/10/17  Monitoring CBC 3 times per week  4/28/17: patient had follow-up appointment at [facility] with Dr James Henning  She recommended to slowly increase dose of veneteclax  Also, she recommended as he has had numerous treatments with Rituxan, if antibody directed therapy becomes indicated in the future recommendation was with [facility]  She will be seeing her on a p r n  Basis      July 2017- Hemolysis  Disease not under control with veneteclax  Started prednisone 60 mg daily, folic acid, IBRUTINIB 435 mg daily and Gazyva       Sept 2017- 9/18-9/20 patient was admitted due to uncontrolled hyperglycemia with new onset DM type II due to chronic prednisone use for CLL/hemolytic anemia; also found to have profound neutropenia  Ibrutinib dose was reduced to 280 mg daily      October 2017- 10/7/17 - 10/14/17 patient was admitted due to cellulitis on his scalp     Dec 2017- Jose Mantle decreased to 140 mg PO daily due to thrombocytopenia     Interval history: weight improved, appetite good  States he is feeling well  Diffuse arthralgias in AM  Had EGD with GI in March 2018, pathology is still pending  ROS: Review of Systems   Constitutional: Negative for appetite change, chills, fever and unexpected weight change  HENT: Negative for mouth sores and nosebleeds  Respiratory: Negative for cough, chest tightness, shortness of breath and wheezing  Cardiovascular: Negative for chest pain, palpitations and leg swelling  Gastrointestinal: Negative for abdominal pain, blood in stool, constipation, diarrhea, nausea and vomiting  Genitourinary: Negative for difficulty urinating, dysuria and hematuria  Musculoskeletal: Positive for arthralgias  Negative for joint swelling and myalgias  Skin: Negative  Neurological: Negative for dizziness, weakness, light-headedness, numbness and headaches  Hematological: Negative  Psychiatric/Behavioral: Negative  Past Medical History:   Diagnosis Date    CAD (coronary artery disease) 2004    Cellulitis of scalp     CKD (chronic kidney disease) stage 3, GFR 30-59 ml/min     [facility]LL [facility]ocytic [facility]) [facility] [address] )     COPD (chronic obstructive pulmonary disease) ([facility] [facility] )     [facility]     H/O blood clots     Hemolytic anemia (HCC)     History of TIA (transient ischemic attack)     Hyperlipidemia     Hypertension     Multiple gastric ulcers     Myocardial infarction     Recurrent sinusitis     Thrombocytopenia (HCC)     Vertigo        Past Surgical History:   Procedure Laterality Date    ARTHROSCOPIC REPAIR ACL      lt knee    CARDIAC SURGERY      cardiac cath with stent    KNEE ARTHROSCOPY      PORTACATH PLACEMENT      AK ESOPHAGOGASTRODUODENOSCOPY TRANSORAL DIAGNOSTIC N/A 10/5/2017    Procedure: ESOPHAGOGASTRODUODENOSCOPY (EGD) with bx;  Surgeon: Diogenes Pichardo DO;  Location: [facility]; Service: Gastroenterology    AK ESOPHAGOGASTRODUODENOSCOPY TRANSORAL DIAGNOSTIC N/A 3/8/2018    Procedure: ESOPHAGOGASTRODUODENOSCOPY (EGD) with biopsy;  Surgeon: Diogenes Pichardo DO;  Location: [facility]; Service: Gastroenterology    AK ESOPHAGOSCOPY FLEXIBLE TRANSORAL WITH BIOPSY N/A 9/2/2016    Procedure: ESOPHAGOGASTRODUODENOSCOPY (EGD); ESOPHAGEAL DILATION; ESOPHAGEAL BIOPSY;  Surgeon: Sly Pérez MD;  [facility]: [facility];  Service: Thoracic    TONSILLECTOMY      UPPER GASTROINTESTINAL ENDOSCOPY      x 6       Social History     Social History    Marital status: /Civil Union     Spouse name: N/A    Number of children: N/A    Years of education: N/A     Social History Main Topics    Smoking status: Former Smoker    Smokeless tobacco: Never Used      Comment: pt refuses to answer question    Alcohol use No    Drug use: No    Sexual activity: Not Asked     Other Topics Concern    None     Social History Narrative    None       No family history on file      Allergies Allergen Reactions    Heparin Other (See Comments)     Other reaction(s): Blood Disorders  clot    Macrolides And Ketolides Other (See Comments)     Interacts with other meds he is taking         Current Outpatient Prescriptions:     acetaminophen (TYLENOL) 325 mg tablet, Take 2 tablets by mouth every 6 (six) hours as needed for fever (fever > 101 4), Disp: 60 tablet, Rfl: 0    acyclovir (ZOVIRAX) 400 MG tablet, Take 400 mg by mouth 2 (two) times a day, Disp: , Rfl:     aspirin 81 MG tablet, Take 81 mg by mouth every other day Every other day , Disp: , Rfl:     citalopram (CeleXA) 40 mg tablet, Take 40 mg by mouth daily, Disp: , Rfl:     fenofibrate (TRIGLIDE) 50 MG tablet, Take 134 mg by mouth daily  , Disp: , Rfl:     folic acid (FOLVITE) 1 mg tablet, Take 1 mg by mouth daily, Disp: , Rfl:     gabapentin (NEURONTIN) 300 mg capsule, Take 2 capsules by mouth 3 (three) times a day (Patient taking differently: Take 600 mg by mouth daily  ), Disp: 90 capsule, Rfl: 0    glucose monitoring kit (FREESTYLE) monitoring kit, 1 each by Does not apply route as needed ( ) E 11 9, Disp: 1 each, Rfl: 0    Ibrutinib 140 MG CAPS, Take 140 mg by mouth daily  , Disp: , Rfl:     insulin glargine (LANTUS) 100 units/mL subcutaneous injection, Inject 15 Units under the skin every morning (Patient taking differently: Inject 12 Units under the skin every morning  ), Disp: 10 mL, Rfl: 0    insulin lispro (HumaLOG) 100 units/mL injection, Inject 10 Units under the skin 3 (three) times a day with meals, Disp: , Rfl: 0    Lancets (FREESTYLE) lancets, Use as instructed--test 2 times a day  E 11 9, Disp: 100 each, Rfl: 0    multivitamin (THERAGRAN) TABS, Take 1 tablet by mouth daily, Disp: , Rfl:     obinutuzumab (GAZYVA) 1000 mg/40 mL SOLN, Infuse into a venous catheter, Disp: , Rfl:     pantoprazole (PROTONIX) 40 mg tablet, Take 40 mg by mouth daily  , Disp: , Rfl:     predniSONE 20 mg tablet, Take 1 tablet by mouth daily (Patient taking differently: Take 10 mg by mouth daily  ), Disp: , Rfl: 0    VENTOLIN  (90 Base) MCG/ACT inhaler, INHALE 2 PUFFS 4 TIMES A DAY AS NEEDED, Disp: , Rfl: 3      Physical Exam:  /90 (BP Location: Left arm, Patient Position: Sitting, Cuff Size: Adult)   Pulse 77   Temp 97 6 °F (36 4 °C)   Resp 16   Ht 6' 1" (1 854 m)   Wt 101 kg (223 lb 3 2 oz)   SpO2 98%   BMI 29 45 kg/m²     Physical Exam   Constitutional: He is oriented to person, place, and time  He appears well-developed and well-nourished  No distress  HENT:   Head: Normocephalic and atraumatic  Eyes: Conjunctivae are normal  No scleral icterus  Neck: Normal range of motion  Neck supple  Cardiovascular: Normal rate, regular rhythm and normal heart sounds  No murmur heard  Pulmonary/Chest: Effort normal and breath sounds normal  No respiratory distress  Abdominal: Soft  There is no tenderness  Musculoskeletal: Normal range of motion  He exhibits no edema or tenderness  Lymphadenopathy:     He has no cervical adenopathy  Neurological: He is alert and oriented to person, place, and time  No cranial nerve deficit  Skin: Skin is warm and dry  Psychiatric: He has a normal mood and affect  Vitals reviewed  Labs:  Lab Results   Component Value Date    WBC 2 80 (L) 03/12/2018    HGB 13 7 03/12/2018    HCT 42 4 03/12/2018    MCV 88 03/12/2018    PLT 49 (LL) 03/12/2018     Lab Results   Component Value Date     (H) 03/12/2018    K 3 8 03/12/2018     03/12/2018    CO2 28 03/12/2018    ANIONGAP 10 03/12/2018    BUN 20 03/12/2018    CREATININE 1 10 03/12/2018    GLUCOSE 96 03/12/2018    GLUF 117 (H) 06/19/2017    CALCIUM 9 4 03/12/2018    AST 23 03/12/2018    ALT 26 03/12/2018    ALKPHOS 42 (L) 03/12/2018    PROT 5 7 (L) 03/12/2018    BILITOT 0 80 03/12/2018    EGFR 71 03/12/2018     @RESULTFAST(TSH)@    Patient voiced understanding and agreement in the above discussion   Aware to contact our office with questions/symptoms in the interim

## 2018-03-15 RX ORDER — SODIUM CHLORIDE 9 MG/ML
20 INJECTION, SOLUTION INTRAVENOUS CONTINUOUS
Status: DISCONTINUED | OUTPATIENT
Start: 2018-03-16 | End: 2018-03-19 | Stop reason: HOSPADM

## 2018-03-15 RX ORDER — ACETAMINOPHEN 325 MG/1
650 TABLET ORAL ONCE
Status: COMPLETED | OUTPATIENT
Start: 2018-03-16 | End: 2018-03-16

## 2018-03-16 ENCOUNTER — HOSPITAL ENCOUNTER (OUTPATIENT)
Dept: INFUSION CENTER | Facility: CLINIC | Age: 63
Discharge: HOME | End: 2018-03-16
Payer: MEDICARE

## 2018-03-16 VITALS
BODY MASS INDEX: 30.25 KG/M2 | TEMPERATURE: 97.5 F | HEART RATE: 68 BPM | WEIGHT: 223.33 LBS | SYSTOLIC BLOOD PRESSURE: 145 MMHG | DIASTOLIC BLOOD PRESSURE: 79 MMHG | HEIGHT: 72 IN | RESPIRATION RATE: 16 BRPM

## 2018-03-16 PROCEDURE — 96367 TX/PROPH/DG ADDL SEQ IV INF: CPT

## 2018-03-16 PROCEDURE — 96375 TX/PRO/DX INJ NEW DRUG ADDON: CPT

## 2018-03-16 PROCEDURE — 96413 CHEMO IV INFUSION 1 HR: CPT

## 2018-03-16 PROCEDURE — 96415 CHEMO IV INFUSION ADDL HR: CPT

## 2018-03-16 RX ADMIN — SODIUM CHLORIDE 20 ML/HR: 0.9 INJECTION, SOLUTION INTRAVENOUS at 09:00

## 2018-03-16 RX ADMIN — DIPHENHYDRAMINE HYDROCHLORIDE 25 MG: 50 INJECTION, SOLUTION INTRAMUSCULAR; INTRAVENOUS at 09:21

## 2018-03-16 RX ADMIN — ACETAMINOPHEN 650 MG: 325 TABLET, FILM COATED ORAL at 09:09

## 2018-03-16 RX ADMIN — OBINUTUZUMAB 1000 MG: 1000 INJECTION, SOLUTION, CONCENTRATE INTRAVENOUS at 10:16

## 2018-03-16 RX ADMIN — DEXAMETHASONE SODIUM PHOSPHATE 20 MG: 10 INJECTION, SOLUTION INTRAMUSCULAR; INTRAVENOUS at 09:05

## 2018-03-16 NOTE — PLAN OF CARE
Problem: PAIN - ADULT  Goal: Verbalizes/displays adequate comfort level or baseline comfort level  Interventions:  - Encourage patient to monitor pain and request assistance  - Assess pain using appropriate pain scale  - Administer analgesics based on type and severity of pain and evaluate response  - Implement non-pharmacological measures as appropriate and evaluate response  - Consider cultural and social influences on pain and pain management  - Notify physician/advanced practitioner if interventions unsuccessful or patient reports new pain  Outcome: Progressing      Problem: INFECTION - ADULT  Goal: Absence or prevention of progression during hospitalization  INTERVENTIONS:  - Assess and monitor for signs and symptoms of infection  - Monitor lab/diagnostic results  - Monitor all insertion sites, i e  indwelling lines, tubes, and drains  - Monitor endotracheal (as able) and nasal secretions for changes in amount and color  - [place] appropriate cooling/warming therapies per order  - Administer medications as ordered  - Instruct and encourage patient and family to use good hand hygiene technique  - Identify and instruct in appropriate isolation precautions for identified infection/condition  Outcome: Progressing    Goal: Absence of fever/infection during neutropenic period  INTERVENTIONS:  - Monitor WBC  - Implement neutropenic guidelines  Outcome: Progressing      Problem: Knowledge Deficit  Goal: Patient/family/caregiver demonstrates understanding of disease process, treatment plan, medications, and discharge instructions  Complete learning assessment and assess knowledge base    Interventions:  - Provide teaching at level of understanding  - Provide teaching via preferred learning methods  Outcome: Progressing

## 2018-03-16 NOTE — PROGRESS NOTES
Pt  Denies new symptoms or concerns at this time  Platelets 61,974  Ordered received ok to treat  Other labs within normal parameters for Gazyva infusion today

## 2018-03-19 ENCOUNTER — TELEPHONE (OUTPATIENT)
Dept: HEMATOLOGY ONCOLOGY | Facility: CLINIC | Age: 63
End: 2018-03-19

## 2018-03-19 ENCOUNTER — HOSPITAL ENCOUNTER (OUTPATIENT)
Dept: INFUSION CENTER | Facility: CLINIC | Age: 63
Discharge: HOME | End: 2018-03-19
Payer: MEDICARE

## 2018-03-19 LAB
ALBUMIN SERPL BCP-MCNC: 4 G/DL (ref 3.2–5)
ALP SERPL-CCNC: 50 U/L (ref 46–116)
ALT SERPL W P-5'-P-CCNC: 24 U/L (ref 12–78)
ANION GAP SERPL CALCULATED.3IONS-SCNC: 13 MMOL/L (ref 4–13)
ANISOCYTOSIS BLD QL SMEAR: PRESENT
AST SERPL W P-5'-P-CCNC: 21 U/L (ref 5–45)
BASOPHILS # BLD AUTO: 0 THOUSAND/UL (ref 0–0.1)
BASOPHILS NFR MAR MANUAL: 0 % (ref 0–1)
BILIRUB SERPL-MCNC: 0.7 MG/DL (ref 0.2–1)
BUN SERPL-MCNC: 22 MG/DL (ref 5–25)
CALCIUM SERPL-MCNC: 9.3 MG/DL (ref 8.3–10.1)
CHLORIDE SERPL-SCNC: 106 MMOL/L (ref 98–108)
CO2 SERPL-SCNC: 27 MMOL/L (ref 21–32)
CREAT SERPL-MCNC: 1.4 MG/DL (ref 0.6–1.3)
EOSINOPHIL # BLD AUTO: 0.08 THOUSAND/UL (ref 0–0.61)
EOSINOPHIL NFR BLD MANUAL: 2 % (ref 0–6)
ERYTHROCYTE [DISTWIDTH] IN BLOOD BY AUTOMATED COUNT: 16.4 % (ref 11.6–15.1)
GFR SERPL CREATININE-BSD FRML MDRD: 53 ML/MIN/1.73SQ M
GLUCOSE SERPL-MCNC: 107 MG/DL (ref 65–140)
HCT VFR BLD AUTO: 40.3 % (ref 36.5–49.3)
HGB BLD-MCNC: 13.6 G/DL (ref 12–17)
LYMPHOCYTES # BLD AUTO: 0.86 THOUSAND/UL (ref 0.6–4.47)
LYMPHOCYTES # BLD AUTO: 21 % (ref 14–44)
MCH RBC QN AUTO: 29.6 PG (ref 26.8–34.3)
MCHC RBC AUTO-ENTMCNC: 33.8 G/DL (ref 31.4–37.4)
MCV RBC AUTO: 88 FL (ref 82–98)
MONOCYTES # BLD AUTO: 0.37 THOUSAND/UL (ref 0–1.22)
MONOCYTES NFR BLD AUTO: 9 % (ref 4–12)
NEUTS BAND NFR BLD MANUAL: 0 % (ref 0–8)
NEUTS SEG # BLD: 2.75 THOUSAND/UL (ref 1.81–6.82)
NEUTS SEG NFR BLD AUTO: 67 % (ref 43–75)
PLATELET # BLD AUTO: 37 THOUSANDS/UL (ref 149–390)
PLATELET BLD QL SMEAR: ABNORMAL
PMV BLD AUTO: 8.3 FL (ref 8.9–12.7)
POTASSIUM SERPL-SCNC: 3.9 MMOL/L (ref 3.5–5.3)
PROT SERPL-MCNC: 5.8 G/DL (ref 6.4–8.2)
RBC # BLD AUTO: 4.6 MILLION/UL (ref 3.88–5.62)
RBC MORPH BLD: PRESENT
SODIUM SERPL-SCNC: 146 MMOL/L (ref 136–145)
TOTAL CELLS COUNTED SPEC: 100
VARIANT LYMPHS # BLD AUTO: 1 % (ref 0–0)
WBC # BLD AUTO: 4.1 THOUSAND/UL (ref 4.31–10.16)
WBC NRBC COR # BLD: 4.1 THOUSAND/UL (ref 4.31–10.16)

## 2018-03-19 PROCEDURE — 85027 COMPLETE CBC AUTOMATED: CPT | Performed by: INTERNAL MEDICINE

## 2018-03-19 PROCEDURE — 80053 COMPREHEN METABOLIC PANEL: CPT | Performed by: INTERNAL MEDICINE

## 2018-03-19 PROCEDURE — 85007 BL SMEAR W/DIFF WBC COUNT: CPT | Performed by: INTERNAL MEDICINE

## 2018-03-19 NOTE — PROGRESS NOTES
Pt reports bleeding of his nose while blowing his nose this AM   Joint pain in hands reported increased; reports altered mobility especially upon waking in the morning  Weekly labs obtained as ordered  Future appointments reviewed    Declined INDIO

## 2018-03-19 NOTE — PROGRESS NOTES
Critical Platelet Count of 26,901 reported to office of Dr Tyson Roberts  RN spoke with Jaleel Hilliard who took information and will forward to Tanya Hilliard took Rn's extension in case of further questions from Josie Cuenca RN called Patient also to report decreased platelet count  Pt  Understands to call physician with new symptoms or persistent bleeding  Carol De Jesus

## 2018-03-19 NOTE — TELEPHONE ENCOUNTER
Patient had chemotherapy treatment on 3/16/18  Patient is currently 3 days post treatment and is due for a CBC on Day 8

## 2018-03-19 NOTE — PLAN OF CARE
Problem: PAIN - ADULT  Goal: Verbalizes/displays adequate comfort level or baseline comfort level  Interventions:  - Encourage patient to monitor pain and request assistance  - Assess pain using appropriate pain scale  - Administer analgesics based on type and severity of pain and evaluate response  - Implement non-pharmacological measures as appropriate and evaluate response  - Consider cultural and social influences on pain and pain management  - Notify physician/advanced practitioner if interventions unsuccessful or patient reports new pain  Outcome: Progressing      Problem: INFECTION - ADULT  Goal: Absence or prevention of progression during hospitalization  INTERVENTIONS:  - Assess and monitor for signs and symptoms of infection  - Monitor lab/diagnostic results  - Monitor all insertion sites, i e  indwelling lines, tubes, and drains  - Monitor endotracheal (as able) and nasal secretions for changes in amount and color  - [place] appropriate cooling/warming therapies per order  - Administer medications as ordered  - Instruct and encourage patient and family to use good hand hygiene technique  - Identify and instruct in appropriate isolation precautions for identified infection/condition  Outcome: Progressing    Goal: Absence of fever/infection during neutropenic period  INTERVENTIONS:  - Monitor WBC  - Implement neutropenic guidelines  Outcome: Progressing      Problem: Knowledge Deficit  Goal: Patient/family/caregiver demonstrates understanding of disease process, treatment plan, medications, and discharge instructions  Complete learning assessment and assess knowledge base    Interventions:  - Provide teaching at level of understanding  - Provide teaching via preferred learning methods  Outcome: Progressing

## 2018-03-23 ENCOUNTER — TELEPHONE (OUTPATIENT)
Dept: ENDOCRINOLOGY | Facility: CLINIC | Age: 63
End: 2018-03-23

## 2018-03-24 DIAGNOSIS — C91.10 CLL (CHRONIC LYMPHOCYTIC LEUKEMIA) (HCC): Primary | ICD-10-CM

## 2018-03-25 RX ORDER — PREDNISONE 1 MG/1
TABLET ORAL
Qty: 30 TABLET | Refills: 4 | Status: SHIPPED | OUTPATIENT
Start: 2018-03-25 | End: 2018-04-23

## 2018-03-26 ENCOUNTER — HOSPITAL ENCOUNTER (OUTPATIENT)
Dept: INFUSION CENTER | Facility: CLINIC | Age: 63
Discharge: HOME | End: 2018-03-26
Payer: MEDICARE

## 2018-03-26 DIAGNOSIS — C91.10 CHRONIC LYMPHOCYTIC LEUKEMIA (HCC): ICD-10-CM

## 2018-03-26 LAB
ALBUMIN SERPL BCP-MCNC: 4 G/DL (ref 3.2–5)
ALP SERPL-CCNC: 58 U/L (ref 46–116)
ALT SERPL W P-5'-P-CCNC: 26 U/L (ref 12–78)
ANION GAP SERPL CALCULATED.3IONS-SCNC: 13 MMOL/L (ref 4–13)
ANISOCYTOSIS BLD QL SMEAR: PRESENT
AST SERPL W P-5'-P-CCNC: 25 U/L (ref 5–45)
BASOPHILS # BLD AUTO: 0 THOUSAND/UL (ref 0–0.1)
BASOPHILS NFR MAR MANUAL: 0 % (ref 0–1)
BILIRUB DIRECT SERPL-MCNC: 0.15 MG/DL (ref 0–0.2)
BILIRUB SERPL-MCNC: 0.8 MG/DL (ref 0.2–1)
BUN SERPL-MCNC: 20 MG/DL (ref 5–25)
CALCIUM SERPL-MCNC: 9.6 MG/DL (ref 8.3–10.1)
CHLORIDE SERPL-SCNC: 104 MMOL/L (ref 98–108)
CO2 SERPL-SCNC: 26 MMOL/L (ref 21–32)
CREAT SERPL-MCNC: 1.2 MG/DL (ref 0.6–1.3)
EOSINOPHIL # BLD AUTO: 0 THOUSAND/UL (ref 0–0.61)
EOSINOPHIL NFR BLD MANUAL: 0 % (ref 0–6)
ERYTHROCYTE [DISTWIDTH] IN BLOOD BY AUTOMATED COUNT: 16.5 % (ref 11.6–15.1)
GFR SERPL CREATININE-BSD FRML MDRD: 64 ML/MIN/1.73SQ M
GLUCOSE SERPL-MCNC: 129 MG/DL (ref 65–140)
HCT VFR BLD AUTO: 42.5 % (ref 36.5–49.3)
HGB BLD-MCNC: 13.5 G/DL (ref 12–17)
IGG SERPL-MCNC: 86 MG/DL (ref 700–1600)
LDH SERPL-CCNC: 231 U/L (ref 81–234)
LYMPHOCYTES # BLD AUTO: 0.75 THOUSAND/UL (ref 0.6–4.47)
LYMPHOCYTES # BLD AUTO: 17 % (ref 14–44)
MCH RBC QN AUTO: 28.1 PG (ref 26.8–34.3)
MCHC RBC AUTO-ENTMCNC: 31.8 G/DL (ref 31.4–37.4)
MCV RBC AUTO: 89 FL (ref 82–98)
MONOCYTES # BLD AUTO: 0.18 THOUSAND/UL (ref 0–1.22)
MONOCYTES NFR BLD AUTO: 4 % (ref 4–12)
NEUTS BAND NFR BLD MANUAL: 0 % (ref 0–8)
NEUTS SEG # BLD: 3.48 THOUSAND/UL (ref 1.81–6.82)
NEUTS SEG NFR BLD AUTO: 79 % (ref 43–75)
PLATELET # BLD AUTO: 50 THOUSANDS/UL (ref 149–390)
PLATELET BLD QL SMEAR: ABNORMAL
PMV BLD AUTO: 9.3 FL (ref 8.9–12.7)
POTASSIUM SERPL-SCNC: 4 MMOL/L (ref 3.5–5.3)
PROT SERPL-MCNC: 5.7 G/DL (ref 6.4–8.2)
RBC # BLD AUTO: 4.81 MILLION/UL (ref 3.88–5.62)
RETICS # AUTO: NORMAL 10*3/UL (ref 14356–105094)
RETICS # CALC: 1.79 % (ref 0.37–1.87)
SODIUM SERPL-SCNC: 143 MMOL/L (ref 136–145)
TOTAL CELLS COUNTED SPEC: 100
URATE SERPL-MCNC: 4.7 MG/DL (ref 4.2–8)
WBC # BLD AUTO: 4.4 THOUSAND/UL (ref 4.31–10.16)
WBC NRBC COR # BLD: 4.4 THOUSAND/UL (ref 4.31–10.16)

## 2018-03-26 PROCEDURE — 80053 COMPREHEN METABOLIC PANEL: CPT

## 2018-03-26 PROCEDURE — 85027 COMPLETE CBC AUTOMATED: CPT

## 2018-03-26 PROCEDURE — 85007 BL SMEAR W/DIFF WBC COUNT: CPT

## 2018-03-26 PROCEDURE — 83615 LACTATE (LD) (LDH) ENZYME: CPT

## 2018-03-26 PROCEDURE — 82784 ASSAY IGA/IGD/IGG/IGM EACH: CPT

## 2018-03-26 PROCEDURE — 85045 AUTOMATED RETICULOCYTE COUNT: CPT

## 2018-03-26 PROCEDURE — 82248 BILIRUBIN DIRECT: CPT

## 2018-03-26 PROCEDURE — 84550 ASSAY OF BLOOD/URIC ACID: CPT

## 2018-03-27 DIAGNOSIS — C91.10 CHRONIC LYMPHATIC LEUKEMIA (HCC): ICD-10-CM

## 2018-04-03 ENCOUNTER — HOSPITAL ENCOUNTER (OUTPATIENT)
Dept: INFUSION CENTER | Facility: CLINIC | Age: 63
Discharge: HOME | End: 2018-04-03
Payer: MEDICARE

## 2018-04-03 DIAGNOSIS — C91.10 CHRONIC LYMPHOCYTIC LEUKEMIA (HCC): ICD-10-CM

## 2018-04-03 LAB
ALBUMIN SERPL BCP-MCNC: 3.8 G/DL (ref 3.2–5)
ALP SERPL-CCNC: 54 U/L (ref 46–116)
ALT SERPL W P-5'-P-CCNC: 27 U/L (ref 12–78)
ANION GAP SERPL CALCULATED.3IONS-SCNC: 8 MMOL/L (ref 4–13)
ANISOCYTOSIS BLD QL SMEAR: PRESENT
AST SERPL W P-5'-P-CCNC: 24 U/L (ref 5–45)
BASOPHILS # BLD AUTO: 0.08 THOUSAND/UL (ref 0–0.1)
BASOPHILS NFR MAR MANUAL: 2 % (ref 0–1)
BILIRUB SERPL-MCNC: 0.6 MG/DL (ref 0.2–1)
BUN SERPL-MCNC: 16 MG/DL (ref 5–25)
CALCIUM SERPL-MCNC: 9.3 MG/DL (ref 8.3–10.1)
CHLORIDE SERPL-SCNC: 106 MMOL/L (ref 98–108)
CO2 SERPL-SCNC: 28 MMOL/L (ref 21–32)
CREAT SERPL-MCNC: 1.1 MG/DL (ref 0.6–1.3)
DACRYOCYTES BLD QL SMEAR: PRESENT
EOSINOPHIL # BLD AUTO: 0.32 THOUSAND/UL (ref 0–0.61)
EOSINOPHIL NFR BLD MANUAL: 8 % (ref 0–6)
ERYTHROCYTE [DISTWIDTH] IN BLOOD BY AUTOMATED COUNT: 16 % (ref 11.6–15.1)
GFR SERPL CREATININE-BSD FRML MDRD: 71 ML/MIN/1.73SQ M
GLUCOSE SERPL-MCNC: 130 MG/DL (ref 65–140)
HCT VFR BLD AUTO: 42.7 % (ref 36.5–49.3)
HGB BLD-MCNC: 13.7 G/DL (ref 12–17)
LG PLATELETS BLD QL SMEAR: PRESENT
LYMPHOCYTES # BLD AUTO: 0.68 THOUSAND/UL (ref 0.6–4.47)
LYMPHOCYTES # BLD AUTO: 17 % (ref 14–44)
MCH RBC QN AUTO: 28.4 PG (ref 26.8–34.3)
MCHC RBC AUTO-ENTMCNC: 32 G/DL (ref 31.4–37.4)
MCV RBC AUTO: 89 FL (ref 82–98)
MONOCYTES # BLD AUTO: 0.12 THOUSAND/UL (ref 0–1.22)
MONOCYTES NFR BLD AUTO: 3 % (ref 4–12)
NEUTS BAND NFR BLD MANUAL: 1 % (ref 0–8)
NEUTS SEG # BLD: 2.8 THOUSAND/UL (ref 1.81–6.82)
NEUTS SEG NFR BLD AUTO: 69 % (ref 43–75)
OVALOCYTES BLD QL SMEAR: PRESENT
PLATELET # BLD AUTO: 55 THOUSANDS/UL (ref 149–390)
PLATELET BLD QL SMEAR: ABNORMAL
PMV BLD AUTO: 8.4 FL (ref 8.9–12.7)
POTASSIUM SERPL-SCNC: 4.1 MMOL/L (ref 3.5–5.3)
PROT SERPL-MCNC: 5.8 G/DL (ref 6.4–8.2)
RBC # BLD AUTO: 4.82 MILLION/UL (ref 3.88–5.62)
SODIUM SERPL-SCNC: 142 MMOL/L (ref 136–145)
TOTAL CELLS COUNTED SPEC: 100
WBC # BLD AUTO: 4 THOUSAND/UL (ref 4.31–10.16)
WBC NRBC COR # BLD: 4 THOUSAND/UL (ref 4.31–10.16)

## 2018-04-03 PROCEDURE — 80053 COMPREHEN METABOLIC PANEL: CPT

## 2018-04-03 PROCEDURE — 85007 BL SMEAR W/DIFF WBC COUNT: CPT

## 2018-04-03 PROCEDURE — 85027 COMPLETE CBC AUTOMATED: CPT

## 2018-04-03 NOTE — PROGRESS NOTES
Patient arrived to unit without complaints today  Here for weekly labs  Port flushed per protocol and central labs drawn per order    confirmed next appt and declined AVS

## 2018-04-09 ENCOUNTER — HOSPITAL ENCOUNTER (OUTPATIENT)
Dept: INFUSION CENTER | Facility: CLINIC | Age: 63
Discharge: HOME | End: 2018-04-09
Payer: MEDICARE

## 2018-04-09 VITALS — HEART RATE: 70 BPM | SYSTOLIC BLOOD PRESSURE: 148 MMHG | DIASTOLIC BLOOD PRESSURE: 84 MMHG | RESPIRATION RATE: 16 BRPM

## 2018-04-09 LAB
ALBUMIN SERPL BCP-MCNC: 3.8 G/DL (ref 3.2–5)
ALP SERPL-CCNC: 59 U/L (ref 46–116)
ALT SERPL W P-5'-P-CCNC: 30 U/L (ref 12–78)
ANION GAP SERPL CALCULATED.3IONS-SCNC: 11 MMOL/L (ref 4–13)
ANISOCYTOSIS BLD QL SMEAR: PRESENT
AST SERPL W P-5'-P-CCNC: 26 U/L (ref 5–45)
BASOPHILS # BLD AUTO: 0.04 THOUSAND/UL (ref 0–0.1)
BASOPHILS NFR MAR MANUAL: 1 % (ref 0–1)
BILIRUB SERPL-MCNC: 0.7 MG/DL (ref 0.2–1)
BUN SERPL-MCNC: 20 MG/DL (ref 5–25)
CALCIUM SERPL-MCNC: 9.2 MG/DL (ref 8.3–10.1)
CHLORIDE SERPL-SCNC: 107 MMOL/L (ref 98–108)
CO2 SERPL-SCNC: 27 MMOL/L (ref 21–32)
CREAT SERPL-MCNC: 1.2 MG/DL (ref 0.6–1.3)
EOSINOPHIL # BLD AUTO: 0.2 THOUSAND/UL (ref 0–0.61)
EOSINOPHIL NFR BLD MANUAL: 5 % (ref 0–6)
ERYTHROCYTE [DISTWIDTH] IN BLOOD BY AUTOMATED COUNT: 15 % (ref 11.6–15.1)
GFR SERPL CREATININE-BSD FRML MDRD: 64 ML/MIN/1.73SQ M
GLUCOSE SERPL-MCNC: 124 MG/DL (ref 65–140)
HCT VFR BLD AUTO: 40.8 % (ref 36.5–49.3)
HGB BLD-MCNC: 13.4 G/DL (ref 12–17)
LYMPHOCYTES # BLD AUTO: 0.68 THOUSAND/UL (ref 0.6–4.47)
LYMPHOCYTES # BLD AUTO: 17 % (ref 14–44)
MCH RBC QN AUTO: 29.5 PG (ref 26.8–34.3)
MCHC RBC AUTO-ENTMCNC: 32.8 G/DL (ref 31.4–37.4)
MCV RBC AUTO: 90 FL (ref 82–98)
MONOCYTES # BLD AUTO: 0.28 THOUSAND/UL (ref 0–1.22)
MONOCYTES NFR BLD AUTO: 7 % (ref 4–12)
NEUTS BAND NFR BLD MANUAL: 1 % (ref 0–8)
NEUTS SEG # BLD: 2.8 THOUSAND/UL (ref 1.81–6.82)
NEUTS SEG NFR BLD AUTO: 69 % (ref 43–75)
OVALOCYTES BLD QL SMEAR: PRESENT
PLATELET # BLD AUTO: 57 THOUSANDS/UL (ref 149–390)
PLATELET BLD QL SMEAR: ABNORMAL
POTASSIUM SERPL-SCNC: 3.9 MMOL/L (ref 3.5–5.3)
PROT SERPL-MCNC: 5.6 G/DL (ref 6.4–8.2)
RBC # BLD AUTO: 4.54 MILLION/UL (ref 3.88–5.62)
SODIUM SERPL-SCNC: 145 MMOL/L (ref 136–145)
TOTAL CELLS COUNTED SPEC: 100
WBC # BLD AUTO: 4 THOUSAND/UL (ref 4.31–10.16)
WBC NRBC COR # BLD: 4 THOUSAND/UL (ref 4.31–10.16)

## 2018-04-09 PROCEDURE — 80053 COMPREHEN METABOLIC PANEL: CPT | Performed by: INTERNAL MEDICINE

## 2018-04-09 PROCEDURE — 85007 BL SMEAR W/DIFF WBC COUNT: CPT | Performed by: INTERNAL MEDICINE

## 2018-04-09 PROCEDURE — 85027 COMPLETE CBC AUTOMATED: CPT | Performed by: INTERNAL MEDICINE

## 2018-04-09 NOTE — PLAN OF CARE
Problem: PAIN - ADULT  Goal: Verbalizes/displays adequate comfort level or baseline comfort level  Interventions:  - Encourage patient to monitor pain and request assistance  - Assess pain using appropriate pain scale  - Administer analgesics based on type and severity of pain and evaluate response  - Implement non-pharmacological measures as appropriate and evaluate response  - Consider cultural and social influences on pain and pain management  - Notify physician/advanced practitioner if interventions unsuccessful or patient reports new pain  Outcome: Progressing      Problem: INFECTION - ADULT  Goal: Absence or prevention of progression during hospitalization  INTERVENTIONS:  - Assess and monitor for signs and symptoms of infection  - Monitor lab/diagnostic results  - Monitor all insertion sites, i e  indwelling lines, tubes, and drains  - Monitor endotracheal (as able) and nasal secretions for changes in amount and color  - [place] appropriate cooling/warming therapies per order  - Administer medications as ordered  - Instruct and encourage patient and family to use good hand hygiene technique  - Identify and instruct in appropriate isolation precautions for identified infection/condition  Outcome: Progressing    Goal: Absence of fever/infection during neutropenic period  INTERVENTIONS:  - Monitor WBC  - Implement neutropenic guidelines  Outcome: Progressing      Problem: Knowledge Deficit  Goal: Patient/family/caregiver demonstrates understanding of disease process, treatment plan, medications, and discharge instructions  Complete learning assessment and assess knowledge base    Interventions:  - Provide teaching at level of understanding  - Provide teaching via preferred learning methods  Outcome: Progressing

## 2018-04-09 NOTE — PROGRESS NOTES
Pt  Denies new symptoms or concerns at this time  Labs obtained via port a cath  Excellent blood return noted  Future appointments reviewed   Declined AVS

## 2018-04-10 RX ORDER — ACETAMINOPHEN 325 MG/1
650 TABLET ORAL ONCE
Status: COMPLETED | OUTPATIENT
Start: 2018-04-11 | End: 2018-04-11

## 2018-04-10 RX ORDER — SODIUM CHLORIDE 9 MG/ML
20 INJECTION, SOLUTION INTRAVENOUS CONTINUOUS
Status: DISCONTINUED | OUTPATIENT
Start: 2018-04-11 | End: 2018-04-14 | Stop reason: HOSPADM

## 2018-04-11 ENCOUNTER — OFFICE VISIT (OUTPATIENT)
Dept: HEMATOLOGY ONCOLOGY | Facility: CLINIC | Age: 63
End: 2018-04-11
Payer: MEDICARE

## 2018-04-11 ENCOUNTER — HOSPITAL ENCOUNTER (OUTPATIENT)
Dept: INFUSION CENTER | Facility: CLINIC | Age: 63
Discharge: HOME | End: 2018-04-11
Payer: MEDICARE

## 2018-04-11 VITALS
HEIGHT: 73 IN | BODY MASS INDEX: 29.69 KG/M2 | WEIGHT: 224 LBS | TEMPERATURE: 97.8 F | DIASTOLIC BLOOD PRESSURE: 80 MMHG | HEART RATE: 82 BPM | OXYGEN SATURATION: 98 % | SYSTOLIC BLOOD PRESSURE: 140 MMHG | RESPIRATION RATE: 18 BRPM

## 2018-04-11 DIAGNOSIS — D59.19 OTHER AUTOIMMUNE HEMOLYTIC ANEMIAS: ICD-10-CM

## 2018-04-11 DIAGNOSIS — C91.10 CHRONIC LYMPHOCYTIC LEUKEMIA (HCC): Primary | ICD-10-CM

## 2018-04-11 DIAGNOSIS — M25.511 BILATERAL SHOULDER PAIN, UNSPECIFIED CHRONICITY: ICD-10-CM

## 2018-04-11 DIAGNOSIS — D75.82 HIT (HEPARIN-INDUCED THROMBOCYTOPENIA) (HCC): ICD-10-CM

## 2018-04-11 DIAGNOSIS — M25.512 BILATERAL SHOULDER PAIN, UNSPECIFIED CHRONICITY: ICD-10-CM

## 2018-04-11 PROCEDURE — 99214 OFFICE O/P EST MOD 30 MIN: CPT | Performed by: PHYSICIAN ASSISTANT

## 2018-04-11 PROCEDURE — 96415 CHEMO IV INFUSION ADDL HR: CPT

## 2018-04-11 PROCEDURE — 96413 CHEMO IV INFUSION 1 HR: CPT

## 2018-04-11 PROCEDURE — 96375 TX/PRO/DX INJ NEW DRUG ADDON: CPT

## 2018-04-11 RX ADMIN — DEXAMETHASONE SODIUM PHOSPHATE 20 MG: 10 INJECTION, SOLUTION INTRAMUSCULAR; INTRAVENOUS at 09:55

## 2018-04-11 RX ADMIN — ACETAMINOPHEN 650 MG: 325 TABLET, FILM COATED ORAL at 09:58

## 2018-04-11 RX ADMIN — SODIUM CHLORIDE 20 ML/HR: 0.9 INJECTION, SOLUTION INTRAVENOUS at 09:55

## 2018-04-11 RX ADMIN — DIPHENHYDRAMINE HYDROCHLORIDE 25 MG: 50 INJECTION, SOLUTION INTRAMUSCULAR; INTRAVENOUS at 10:10

## 2018-04-11 RX ADMIN — OBINUTUZUMAB 1000 MG: 1000 INJECTION, SOLUTION, CONCENTRATE INTRAVENOUS at 10:28

## 2018-04-11 NOTE — PLAN OF CARE
Problem: Potential for Falls  Goal: Patient will remain free of falls  INTERVENTIONS:  - Assess patient frequently for physical needs  -  Identify cognitive and physical deficits and behaviors that affect risk of falls    -  [place] fall precautions as indicated by assessment   - Educate patient/family on patient safety including physical limitations  - Instruct patient to call for assistance with activity based on assessment  - Modify environment to reduce risk of injury  - Consider OT/PT consult to assist with strengthening/mobility   Outcome: Progressing

## 2018-04-11 NOTE — PLAN OF CARE
Problem: Potential for Falls  Goal: Patient will remain free of falls  INTERVENTIONS:  - Assess patient frequently for physical needs  -  Identify cognitive and physical deficits and behaviors that affect risk of falls    -  [place] fall precautions as indicated by assessment   - Educate patient/family on patient safety including physical limitations  - Instruct patient to call for assistance with activity based on assessment  - Modify environment to reduce risk of injury  - Consider OT/PT consult to assist with strengthening/mobility   Outcome: Progressing

## 2018-04-11 NOTE — LETTER
April 11, 2018     [address]  [address] [facility] [address]  [place]  [place]  [id number]    Patient: Katelyn Mcarthur   YOB: 1955   Date of Visit: 4/11/2018       Dear Dr Gould Devoid: Thank you for referring Klaudia Edge to me for evaluation  Below are my notes for this consultation  If you have questions, please do not hesitate to call me  I look forward to following your patient along with you  Sincerely,        Cheryl Samuels PA-C        CC: No Recipients  Cheryl Samuels PA-C  4/11/2018 11:35 AM  Sign at close encounter  Hematology/Oncology Outpatient Follow-up  Katelyn Mcarthur 60 y o  male 1955 [id number]    Date:  4/11/2018      Assessment and Plan:  1  Chronic lymphocytic leukemia ([facility] [address] )  -Patient is on Imbruvica 140 mg PO daily and Gazyva 1000 mg/m2 every 4 weeks  - WBC remains acceptable  He has chronic thrombocytopenia that remains stable and no need for intervention  Hemoglobin is normal in 13-14 range  - CBC and CMP weekly  - Uric acid, LDH, IgG, Retic monthly    - Plan to continue on the above plan  2  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[facility] )  - Patient is aware he is not to receive heparin products  3  Other autoimmune hemolytic anemias ([facility] )    -Patient is on prednisone 10 mg PO daily  He is taking multivitamin   - He also takes calcium and vitamin D as well  - He is to continue on  prednisone 10 mg PO daily    - Patient has been consistent with taking his blood sugars daily  He has not required insulin  He has been sending his blood sugar logs to Endocrinology  4  Bilateral shoulder pain, unspecified chronicity  Patient is having worsening arthralgias  This is affecting his bilateral shoulders as well as hands, wrists, knees  Incidence of arthralgias in patients taking Imbruvica is between 10-20%  This likely is contributing to his arthralgias  Advised him to try Aleve as needed    He is to take this with food and is not to take this at the same time as Imbruvica  - XR shoulder 2+ vw left; Future  - XR shoulder 2+ vw right; Future      HPI:  On 3/20/17 patient found to have hemoglobin of 6 2, ,000  He was admitted to [address] [address] for blood transfusion and plan to transfer to [address] [place] [address]  On 3/20/17 WBC count 195,000, hemoglobin 4 9, platelet count 65  Direct coomb's was positive with undetectable haptoglobin  He was given 2 units of PRBCs, Solu-Medrol 1 g ×3 days and IVIG 1 g/kg daily ×3 days  His hemoglobin continued to drop  Bone marrow biopsy on 3/21 showed marrow cellularity of greater than 95% almost replaced by small lymphocytes, lambda light chain restricted, CD20 positive, CD19 positive, CD23 positive partially expressing CD11c and CD25 and CD5 positive and negative for CD 79 and CD38  He was treated with single dose of chlorambucil to control his CLL   3/22 second dose of chlorambucil, also Rituxan 375 mg/M ² autoimmune hemolytic anemia  WBC continued to rise, 266,000  Patient initiated with Venetoclax 20 mg daily  Tumor lysis monitored every 8 hours; given aggressive hydration and Lasix and remained euvolemic  3/24WBC dropped to 112,000  3/25- WBC 63,000  3/26WBC 15,000, , platelet count 91,365  Patient became febrile, 101 3  The cefepime initiated Venetoclax held  3/28patient fell from bed, CT head negative  ANC 1200, cefepime discontinued and Levaquin 75 mg daily started sliding 3/29 given second dose of rituximab 375 mg/m ²   3/30WBC meg to 14 5 thousand, platelets 50,516, Venetoclax restarted 10 mg every other day  3/31 WBC 4 4, , platelet count 32,927  4/14/4: WBC maintained less than 10,000, ANC and platelet count meg  He was discharged on Venetoclax 10 mg every other day   He was instructed to discontinue simvastatin, Ranexa, aspirin, metoprolol 50 mg q  day, losartan 50 mg q  day, Plavix 75 mg q  day, folic acid 036 µg b i d , prednisone 60 mg, allopurinol  He was advised to continue Levaquin 750 mg daily for 10 days, Lexapro 10 mg daily, fenofibrate 50 mg daily, gabapentin 100 mg twice daily, Protonix 40 mg daily     Imaging studies performed at [facility]:  Patient had echocardiogram while inpatient at [facility] on 3/21  This study was unremarkable  CT chest abdomen pelvis without contrast on 3/24 showed stable pulmonary nodules, patchy groundglass densities of lower lobes previously identified and which may represent volume loss or early infiltrate  Persistent diffuse bronchial wall thickening suggesting acute/chronic bronchitis changes  No bronchiectasis  No masses  Stable lymphadenopathy of mediastinum  Stable axillary lymphadenopathy  Splenomegaly 23 9 cm  Extensive lymphadenopathy throughout the entire retroperitoneal, small bowel mesentery, and pelvis  Largest lymph node in right lower quadrant measured 4 6 x 4 8  Stable enlarged left para-aortic lymph node measuring 6 2 x 6 8   4/12/17: Patient received one dose of Rituxan on 4/7/17  Venetoclax 10 mg every other day 4/5/17 - 4/9/17  Venetoclax 10 mg every day beginning 4/10/17  Monitoring CBC 3 times per week  4/28/17: patient had follow-up appointment at [facility] with Dr Fina Francis  She recommended to slowly increase dose of veneteclax  Also, she recommended as he has had numerous treatments with Rituxan, if antibody directed therapy becomes indicated in the future recommendation was with [facility]  She will be seeing her on a p r n  Basis      July 2017- Hemolysis  Disease not under control with veneteclax  Started prednisone 60 mg daily, folic acid, IBRUTINIB 565 mg daily and Gazyva       Sept 2017- 9/18-9/20 patient was admitted due to uncontrolled hyperglycemia with new onset DM type II due to chronic prednisone use for CLL/hemolytic anemia; also found to have profound neutropenia   Ibrutinib dose was reduced to 280 mg daily      October 2017- 10/7/17 - 10/14/17 patient was admitted due to cellulitis on his scalp     Dec 2017- Imbruvica decreased to 140 mg PO daily due to thrombocytopenia     Interval history: gabapentin 600 mg was taking once daily, increased back to 600 mg BID      ROS: Review of Systems   Constitutional: Positive for activity change (due to worsening arthlagias) and fatigue  Negative for appetite change, chills, fever and unexpected weight change  HENT: Negative for congestion, mouth sores, nosebleeds, sinus pain and sore throat  Respiratory: Negative for cough, chest tightness, shortness of breath and wheezing  Cardiovascular: Negative for chest pain, palpitations and leg swelling  Gastrointestinal: Negative for abdominal pain, blood in stool, constipation, diarrhea, nausea and vomiting  Genitourinary: Negative for difficulty urinating, dysuria and hematuria  Musculoskeletal: Positive for arthralgias  Negative for joint swelling and myalgias  Skin: Negative  Neurological: Negative for dizziness, weakness, light-headedness, numbness and headaches  Hematological: Negative  Psychiatric/Behavioral: Negative          Past Medical History:   Diagnosis Date    CAD (coronary artery disease) 2004    Cellulitis of scalp     CKD (chronic kidney disease) stage 3, GFR 30-59 ml/min     CLL (chronic lymphocytic leukemia) ([facility] [facility] )     COPD (chronic obstructive pulmonary disease) ([facility] )     Diabetes mellitus ([facility] [address] )     H/O blood clots     Hemolytic anemia (HCC)     History of TIA (transient ischemic attack)     Hyperlipidemia     Hypertension     Multiple gastric ulcers     Myocardial infarction ([facility] )     Recurrent sinusitis     Thrombocytopenia (HCC)     Vertigo        Past Surgical History:   Procedure Laterality Date    ARTHROSCOPIC REPAIR ACL      lt knee    CARDIAC SURGERY      cardiac cath with stent    KNEE ARTHROSCOPY      PORTACATH PLACEMENT      [place] ESOPHAGOGASTRODUODENOSCOPY TRANSORAL DIAGNOSTIC N/A 10/5/2017    Procedure: ESOPHAGOGASTRODUODENOSCOPY (EGD) with bx;  Surgeon: Dwayne Dejesus DO;  Location: [facility]; Service: Gastroenterology    [place] ESOPHAGOGASTRODUODENOSCOPY TRANSORAL DIAGNOSTIC N/A 3/8/2018    Procedure: ESOPHAGOGASTRODUODENOSCOPY (EGD) with biopsy;  Surgeon: Dwayne Dejesus DO;  Location: [facility]; Service: Gastroenterology    [place] ESOPHAGOSCOPY FLEXIBLE TRANSORAL WITH BIOPSY N/A 9/2/2016    Procedure: ESOPHAGOGASTRODUODENOSCOPY (EGD); ESOPHAGEAL DILATION; ESOPHAGEAL BIOPSY;  Surgeon: Esteban Rinaldi MD;  Location:  MAIN OR;  Service: Thoracic    TONSILLECTOMY      UPPER GASTROINTESTINAL ENDOSCOPY      x 6       Social History     Social History    Marital status: /Civil Union     Spouse name: N/A    Number of children: N/A    Years of education: N/A     Social History Main Topics    Smoking status: Former Smoker    Smokeless tobacco: Never Used      Comment: pt refuses to answer question    Alcohol use No    Drug use: No    Sexual activity: Not on file     Other Topics Concern    Not on file     Social History Narrative    No narrative on file       No family history on file      Allergies   Allergen Reactions    Heparin Other (See Comments)     Other reaction(s): Blood Disorders  clot    Macrolides And Ketolides Other (See Comments)     Interacts with other meds he is taking         Current Outpatient Prescriptions:     acetaminophen (TYLENOL) 325 mg tablet, Take 2 tablets by mouth every 6 (six) hours as needed for fever (fever > 101 4), Disp: 60 tablet, Rfl: 0    acyclovir (ZOVIRAX) 400 MG tablet, Take 400 mg by mouth 2 (two) times a day, Disp: , Rfl:     aspirin 81 MG tablet, Take 81 mg by mouth every other day Every other day , Disp: , Rfl:     citalopram (CeleXA) 40 mg tablet, Take 40 mg by mouth daily, Disp: , Rfl:     fenofibrate (TRIGLIDE) 50 MG tablet, Take 134 mg by mouth daily  , Disp: , Rfl:     folic acid (FOLVITE) 1 mg tablet, Take 1 mg by mouth daily, Disp: , Rfl:    gabapentin (NEURONTIN) 300 mg capsule, Take 2 capsules by mouth 3 (three) times a day (Patient taking differently: Take 1,200 mg by mouth daily  ), Disp: 90 capsule, Rfl: 0    glucose monitoring kit (FREESTYLE) monitoring kit, 1 each by Does not apply route as needed ( ) E 11 9, Disp: 1 each, Rfl: 0    Ibrutinib 140 MG CAPS, Take 1 capsule by mouth daily, Disp: 30 capsule, Rfl: 1    insulin glargine (LANTUS) 100 units/mL subcutaneous injection, Inject 15 Units under the skin every morning (Patient taking differently: Inject 12 Units under the skin every morning  ), Disp: 10 mL, Rfl: 0    insulin lispro (HumaLOG) 100 units/mL injection, Inject 10 Units under the skin 3 (three) times a day with meals, Disp: , Rfl: 0    Lancets (FREESTYLE) lancets, Use as instructed--test 2 times a day  E 11 9, Disp: 100 each, Rfl: 0    multivitamin (THERAGRAN) TABS, Take 1 tablet by mouth daily, Disp: , Rfl:     obinutuzumab (GAZYVA) 1000 mg/40 mL SOLN, Infuse into a venous catheter, Disp: , Rfl:     pantoprazole (PROTONIX) 40 mg tablet, Take 40 mg by mouth daily  , Disp: , Rfl:     predniSONE 20 mg tablet, Take 1 tablet by mouth daily (Patient taking differently: Take 10 mg by mouth daily  ), Disp: , Rfl: 0    predniSONE 5 mg tablet, 1 BY MOUTH DAILY OR AS DIRECTED  TAKE WITH FOOD, Disp: 30 tablet, Rfl: 4    VENTOLIN  (90 Base) MCG/ACT inhaler, INHALE 2 PUFFS 4 TIMES A DAY AS NEEDED, Disp: , Rfl: 3  No current facility-administered medications for this visit       Facility-Administered Medications Ordered in Other Visits:     acetaminophen (TYLENOL) tablet 650 mg, 650 mg, Oral, Once, Luna Ramos MD    dexamethasone (DECADRON) 20 mg in sodium chloride 0 9 % 50 mL IVPB, 20 mg, Intravenous, Once, Luna Ramos MD    diphenhydrAMINE (BENADRYL) 25 mg in sodium chloride 0 9 % 50 mL IVPB, 25 mg, Intravenous, Once, Estella Mejia MD    obinutuzumab (GAZYVA) 1,000 mg in sodium chloride 0 9 % 250 mL infusion, 1,000 mg, Intravenous, Once, Michel Hurtado MD    sodium chloride 0 9 % infusion, 20 mL/hr, Intravenous, Continuous, Michel Hurtado MD      Physical Exam:  /80 (BP Location: Right arm, Patient Position: Sitting, Cuff Size: Adult)   Pulse 82   Temp 97 8 °F (36 6 °C)   Resp 18   Ht 6' 1" (1 854 m)   Wt 102 kg (224 lb)   SpO2 98%   BMI 29 55 kg/m²      Physical Exam   Constitutional: He is oriented to person, place, and time  He appears well-developed and well-nourished  No distress  Fulness of the face 2/2 prednisone use   HENT:   Head: Normocephalic and atraumatic  Eyes: Conjunctivae are normal  No scleral icterus  Neck: Normal range of motion  Neck supple  Cardiovascular: Normal rate, regular rhythm and normal heart sounds  No murmur heard  Pulmonary/Chest: Effort normal and breath sounds normal  No respiratory distress  Abdominal: Soft  There is no tenderness  Musculoskeletal: Normal range of motion  He exhibits no edema or tenderness  Lymphadenopathy:     He has no cervical adenopathy  Neurological: He is alert and oriented to person, place, and time  No cranial nerve deficit  Skin: Skin is warm and dry  Psychiatric: He has a normal mood and affect  Vitals reviewed  Labs:  Lab Results   Component Value Date    WBC 4 00 (L) 04/09/2018    HGB 13 4 04/09/2018    HCT 40 8 04/09/2018    MCV 90 04/09/2018    PLT 57 (L) 04/09/2018     Lab Results   Component Value Date     04/09/2018    K 3 9 04/09/2018     04/09/2018    CO2 27 04/09/2018    ANIONGAP 11 04/09/2018    BUN 20 04/09/2018    CREATININE 1 20 04/09/2018    GLUCOSE 124 04/09/2018    GLUF 117 (H) 06/19/2017    CALCIUM 9 2 04/09/2018    AST 26 04/09/2018    ALT 30 04/09/2018    ALKPHOS 59 04/09/2018    PROT 5 6 (L) 04/09/2018    BILITOT 0 70 04/09/2018    EGFR 64 04/09/2018       Patient voiced understanding and agreement in the above discussion   Aware to contact our office with questions/symptoms in the interim

## 2018-04-11 NOTE — PROGRESS NOTES
Hematology/Oncology Outpatient Follow-up  Femi Sidhu 60 y o  male 1955 [id number]    Date:  4/11/2018      Assessment and Plan:  1  Chronic lymphocytic leukemia ([facility] )  -Patient is on Imbruvica 140 mg PO daily and Gazyva 1000 mg/m2 every 4 weeks  - WBC remains acceptable  He has chronic thrombocytopenia that remains stable and no need for intervention  Hemoglobin is normal in 13-14 range  - CBC and CMP weekly  - Uric acid, LDH, IgG, Retic monthly    - Plan to continue on the above plan  2  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[place] [address] )  - Patient is aware he is not to receive heparin products  3  Other autoimmune hemolytic anemias ([place] [address] )   -Patient is on prednisone 10 mg PO daily  He is taking multivitamin   - He also takes calcium and vitamin D as well  - He is to continue on  prednisone 10 mg PO daily    - Patient has been consistent with taking his blood sugars daily  He has not required insulin  He has been sending his blood sugar logs to Endocrinology  4  Bilateral shoulder pain, unspecified chronicity  Patient is having worsening arthralgias  This is affecting his bilateral shoulders as well as hands, wrists, knees  Incidence of arthralgias in patients taking Imbruvica is between 10-20%  This likely is contributing to his arthralgias  Advised him to try Aleve as needed  He is to take this with food and is not to take this at the same time as Imbruvica  - XR shoulder 2+ vw left; Future  - XR shoulder 2+ vw right; Future      HPI:  On 3/20/17 patient found to have hemoglobin of 6 2, ,000  He was admitted to [address] [place] [address] for blood transfusion and plan to transfer to [address] [place] [address]  On 3/20/17 WBC count 195,000, hemoglobin 4 9, platelet count 65  Direct coomb's was positive with undetectable haptoglobin  He was given 2 units of PRBCs, Solu-Medrol 1 g ×3 days and IVIG 1 g/kg daily ×3 days  His hemoglobin continued to drop   Bone marrow biopsy on 3/21 showed marrow cellularity of greater than 95% almost replaced by small lymphocytes, lambda light chain restricted, CD20 positive, CD19 positive, CD23 positive partially expressing CD11c and CD25 and CD5 positive and negative for CD 79 and CD38  He was treated with single dose of chlorambucil to control his CLL   3/22 second dose of chlorambucil, also Rituxan 375 mg/M ² autoimmune hemolytic anemia  WBC continued to rise, 266,000  Patient initiated with Venetoclax 20 mg daily  Tumor lysis monitored every 8 hours; given aggressive hydration and Lasix and remained euvolemic  3/24-WBC dropped to 112,000  3/25- WBC 63,000  3/26-WBC 15,000, , platelet count 28,493  Patient became febrile, 101 3  The cefepime initiated Venetoclax held  3/28-patient fell from bed, CT head negative  ANC 1200, cefepime discontinued and Levaquin 75 mg daily started sliding 3/29 given second dose of rituximab 375 mg/m ²   3/30-WBC meg to 14 5 thousand, platelets 34,666, Venetoclax restarted 10 mg every other day  3/31 WBC 4 4, , platelet count 89,312  4/1-4/4: WBC maintained less than 10,000, ANC and platelet count meg  He was discharged on Venetoclax 10 mg every other day  He was instructed to discontinue simvastatin, Ranexa, aspirin, metoprolol 50 mg q  day, losartan 50 mg q  day, Plavix 75 mg q  day, folic acid 409 µg b i d , prednisone 60 mg, allopurinol  He was advised to continue Levaquin 750 mg daily for 10 days, Lexapro 10 mg daily, fenofibrate 50 mg daily, gabapentin 100 mg twice daily, Protonix 40 mg daily     Imaging studies performed at [facility]:  Patient had echocardiogram while inpatient at [facility] on 3/21  This study was unremarkable  CT chest abdomen pelvis without contrast on 3/24 showed stable pulmonary nodules, patchy groundglass densities of lower lobes previously identified and which may represent volume loss or early infiltrate   Persistent diffuse bronchial wall thickening suggesting acute/chronic bronchitis changes  No bronchiectasis  No masses  Stable lymphadenopathy of mediastinum  Stable axillary lymphadenopathy  Splenomegaly 23 9 cm  Extensive lymphadenopathy throughout the entire retroperitoneal, small bowel mesentery, and pelvis  Largest lymph node in right lower quadrant measured 4 6 x 4 8  Stable enlarged left para-aortic lymph node measuring 6 2 x 6 8   4/12/17: Patient received one dose of Rituxan on 4/7/17  Venetoclax 10 mg every other day 4/5/17 - 4/9/17  Venetoclax 10 mg every day beginning 4/10/17  Monitoring CBC 3 times per week  4/28/17: patient had follow-up appointment at [facility] with Dr Tomer Smith  She recommended to slowly increase dose of veneteclax  Also, she recommended as he has had numerous treatments with Rituxan, if antibody directed therapy becomes indicated in the future recommendation was with [facility]  She will be seeing her on a p r n  Basis      July 2017- Hemolysis  Disease not under control with veneteclax  Started prednisone 60 mg daily, folic acid, IBRUTINIB 509 mg daily and Gazyva       Sept 2017- 9/18-9/20 patient was admitted due to uncontrolled hyperglycemia with new onset DM type II due to chronic prednisone use for CLL/hemolytic anemia; also found to have profound neutropenia  Ibrutinib dose was reduced to 280 mg daily      October 2017- 10/7/17 - 10/14/17 patient was admitted due to cellulitis on his scalp     Dec 2017- Imbruvica decreased to 140 mg PO daily due to thrombocytopenia     Interval history: gabapentin 600 mg was taking once daily, increased back to 600 mg BID      ROS: Review of Systems   Constitutional: Positive for activity change (due to worsening arthlagias) and fatigue  Negative for appetite change, chills, fever and unexpected weight change  HENT: Negative for congestion, mouth sores, nosebleeds, sinus pain and sore throat  Respiratory: Negative for cough, chest tightness, shortness of breath and wheezing  Cardiovascular: Negative for chest pain, palpitations and leg swelling  Gastrointestinal: Negative for abdominal pain, blood in stool, constipation, diarrhea, nausea and vomiting  Genitourinary: Negative for difficulty urinating, dysuria and hematuria  Musculoskeletal: Positive for arthralgias  Negative for joint swelling and myalgias  Skin: Negative  Neurological: Negative for dizziness, weakness, light-headedness, numbness and headaches  Hematological: Negative  Psychiatric/Behavioral: Negative  Past Medical History:   Diagnosis Date    CAD (coronary artery disease) 2004    Cellulitis of scalp     CKD (chronic kidney disease) stage 3, GFR 30-59 ml/min     CLL (chronic lymphocytic leukemia) ([facility] )     COPD (chronic obstructive pulmonary disease) ([facility] )     Diabetes mellitus ([place] [address] )     H/O blood clots     Hemolytic anemia (HCC)     History of TIA (transient ischemic attack)     Hyperlipidemia     Hypertension     Multiple gastric ulcers     Myocardial infarction ([place] [address] )     Recurrent sinusitis     Thrombocytopenia (HCC)     Vertigo        Past Surgical History:   Procedure Laterality Date    ARTHROSCOPIC REPAIR ACL      lt knee    CARDIAC SURGERY      cardiac cath with stent    KNEE ARTHROSCOPY      PORTACATH PLACEMENT      MD ESOPHAGOGASTRODUODENOSCOPY TRANSORAL DIAGNOSTIC N/A 10/5/2017    Procedure: ESOPHAGOGASTRODUODENOSCOPY (EGD) with bx;  Surgeon: Jeremi Santos DO;  Location: [facility]; Service: Gastroenterology    MD ESOPHAGOGASTRODUODENOSCOPY TRANSORAL DIAGNOSTIC N/A 3/8/2018    Procedure: ESOPHAGOGASTRODUODENOSCOPY (EGD) with biopsy;  Surgeon: Jeremi Santos DO;  Location: [facility];   Service: Gastroenterology    MD ESOPHAGOSCOPY FLEXIBLE TRANSORAL WITH BIOPSY N/A 9/2/2016    Procedure: ESOPHAGOGASTRODUODENOSCOPY (EGD); ESOPHAGEAL DILATION; ESOPHAGEAL BIOPSY;  Surgeon: Geno Cedeño MD;  [facility]: [facility];  Service: Thoracic    TONSILLECTOMY      UPPER GASTROINTESTINAL ENDOSCOPY      x 6       Social History     Social History    Marital status: /Civil Union     Spouse name: N/A    Number of children: N/A    Years of education: N/A     Social History Main Topics    Smoking status: Former Smoker    Smokeless tobacco: Never Used      Comment: pt refuses to answer question    Alcohol use No    Drug use: No    Sexual activity: Not on file     Other Topics Concern    Not on file     Social History Narrative    No narrative on file       No family history on file      Allergies   Allergen Reactions    Heparin Other (See Comments)     Other reaction(s): Blood Disorders  clot    Macrolides And Ketolides Other (See Comments)     Interacts with other meds he is taking         Current Outpatient Prescriptions:     acetaminophen (TYLENOL) 325 mg tablet, Take 2 tablets by mouth every 6 (six) hours as needed for fever (fever > 101 4), Disp: 60 tablet, Rfl: 0    acyclovir (ZOVIRAX) 400 MG tablet, Take 400 mg by mouth 2 (two) times a day, Disp: , Rfl:     aspirin 81 MG tablet, Take 81 mg by mouth every other day Every other day , Disp: , Rfl:     citalopram (CeleXA) 40 mg tablet, Take 40 mg by mouth daily, Disp: , Rfl:     fenofibrate (TRIGLIDE) 50 MG tablet, Take 134 mg by mouth daily  , Disp: , Rfl:     folic acid (FOLVITE) 1 mg tablet, Take 1 mg by mouth daily, Disp: , Rfl:     gabapentin (NEURONTIN) 300 mg capsule, Take 2 capsules by mouth 3 (three) times a day (Patient taking differently: Take 1,200 mg by mouth daily  ), Disp: 90 capsule, Rfl: 0    glucose monitoring kit (FREESTYLE) monitoring kit, 1 each by Does not apply route as needed ( ) E 11 9, Disp: 1 each, Rfl: 0    Ibrutinib 140 MG CAPS, Take 1 capsule by mouth daily, Disp: 30 capsule, Rfl: 1    insulin glargine (LANTUS) 100 units/mL subcutaneous injection, Inject 15 Units under the skin every morning (Patient taking differently: Inject 12 Units under the skin every morning  ), Disp: 10 mL, Rfl: 0    insulin lispro (HumaLOG) 100 units/mL injection, Inject 10 Units under the skin 3 (three) times a day with meals, Disp: , Rfl: 0    Lancets (FREESTYLE) lancets, Use as instructed--test 2 times a day  E 11 9, Disp: 100 each, Rfl: 0    multivitamin (THERAGRAN) TABS, Take 1 tablet by mouth daily, Disp: , Rfl:     obinutuzumab (GAZYVA) 1000 mg/40 mL SOLN, Infuse into a venous catheter, Disp: , Rfl:     pantoprazole (PROTONIX) 40 mg tablet, Take 40 mg by mouth daily  , Disp: , Rfl:     predniSONE 20 mg tablet, Take 1 tablet by mouth daily (Patient taking differently: Take 10 mg by mouth daily  ), Disp: , Rfl: 0    predniSONE 5 mg tablet, 1 BY MOUTH DAILY OR AS DIRECTED  TAKE WITH FOOD, Disp: 30 tablet, Rfl: 4    VENTOLIN  (90 Base) MCG/ACT inhaler, INHALE 2 PUFFS 4 TIMES A DAY AS NEEDED, Disp: , Rfl: 3  No current facility-administered medications for this visit  Facility-Administered Medications Ordered in Other Visits:     acetaminophen (TYLENOL) tablet 650 mg, 650 mg, Oral, Once, Darlyn Balbuena MD    dexamethasone (DECADRON) 20 mg in sodium chloride 0 9 % 50 mL IVPB, 20 mg, Intravenous, Once, Darlyn Balbuena MD    diphenhydrAMINE (BENADRYL) 25 mg in sodium chloride 0 9 % 50 mL IVPB, 25 mg, Intravenous, Once, Darlyn Balbuena MD    obinutuzumab (GAZYVA) 1,000 mg in sodium chloride 0 9 % 250 mL infusion, 1,000 mg, Intravenous, Once, Darlyn Balbuena MD    sodium chloride 0 9 % infusion, 20 mL/hr, Intravenous, Continuous, Darlyn Balbuena MD      Physical Exam:  /80 (BP Location: Right arm, Patient Position: Sitting, Cuff Size: Adult)   Pulse 82   Temp 97 8 °F (36 6 °C)   Resp 18   Ht 6' 1" (1 854 m)   Wt 102 kg (224 lb)   SpO2 98%   BMI 29 55 kg/m²     Physical Exam   Constitutional: He is oriented to person, place, and time  He appears well-developed and well-nourished  No distress     Fulness of the face 2/2 prednisone use   HENT:   Head: Normocephalic and atraumatic  Eyes: Conjunctivae are normal  No scleral icterus  Neck: Normal range of motion  Neck supple  Cardiovascular: Normal rate, regular rhythm and normal heart sounds  No murmur heard  Pulmonary/Chest: Effort normal and breath sounds normal  No respiratory distress  Abdominal: Soft  There is no tenderness  Musculoskeletal: Normal range of motion  He exhibits no edema or tenderness  Lymphadenopathy:     He has no cervical adenopathy  Neurological: He is alert and oriented to person, place, and time  No cranial nerve deficit  Skin: Skin is warm and dry  Psychiatric: He has a normal mood and affect  Vitals reviewed  Labs:  Lab Results   Component Value Date    WBC 4 00 (L) 04/09/2018    HGB 13 4 04/09/2018    HCT 40 8 04/09/2018    MCV 90 04/09/2018    PLT 57 (L) 04/09/2018     Lab Results   Component Value Date     04/09/2018    K 3 9 04/09/2018     04/09/2018    CO2 27 04/09/2018    ANIONGAP 11 04/09/2018    BUN 20 04/09/2018    CREATININE 1 20 04/09/2018    GLUCOSE 124 04/09/2018    GLUF 117 (H) 06/19/2017    CALCIUM 9 2 04/09/2018    AST 26 04/09/2018    ALT 30 04/09/2018    ALKPHOS 59 04/09/2018    PROT 5 6 (L) 04/09/2018    BILITOT 0 70 04/09/2018    EGFR 64 04/09/2018       Patient voiced understanding and agreement in the above discussion  Aware to contact our office with questions/symptoms in the interim

## 2018-04-11 NOTE — PLAN OF CARE
Problem: Potential for Falls  Goal: Patient will remain free of falls  INTERVENTIONS:  - Assess patient frequently for physical needs  -  Identify cognitive and physical deficits and behaviors that affect risk of falls    -  [place] fall precautions as indicated by assessment   - Educate patient/family on patient safety including physical limitations  - Instruct patient to call for assistance with activity based on assessment  - Modify environment to reduce risk of injury  - Consider OT/PT consult to assist with strengthening/mobility   Outcome: Progressing

## 2018-04-12 ENCOUNTER — TELEPHONE (OUTPATIENT)
Dept: ENDOCRINOLOGY | Facility: CLINIC | Age: 63
End: 2018-04-12

## 2018-04-16 ENCOUNTER — APPOINTMENT (OUTPATIENT)
Dept: RADIOLOGY | Facility: MEDICAL CENTER | Age: 63
End: 2018-04-16
Payer: MEDICARE

## 2018-04-16 ENCOUNTER — HOSPITAL ENCOUNTER (OUTPATIENT)
Dept: INFUSION CENTER | Facility: CLINIC | Age: 63
Discharge: HOME | End: 2018-04-16
Payer: MEDICARE

## 2018-04-16 DIAGNOSIS — C91.10 CHRONIC LYMPHOCYTIC LEUKEMIA (HCC): ICD-10-CM

## 2018-04-16 DIAGNOSIS — M25.511 BILATERAL SHOULDER PAIN, UNSPECIFIED CHRONICITY: ICD-10-CM

## 2018-04-16 DIAGNOSIS — M25.512 BILATERAL SHOULDER PAIN, UNSPECIFIED CHRONICITY: ICD-10-CM

## 2018-04-16 LAB
ALBUMIN SERPL BCP-MCNC: 3.9 G/DL (ref 3.5–5.7)
ALP SERPL-CCNC: 48 U/L (ref 46–116)
ALT SERPL W P-5'-P-CCNC: 30 U/L (ref 12–78)
ANION GAP SERPL CALCULATED.3IONS-SCNC: 12 MMOL/L (ref 4–13)
ANISOCYTOSIS BLD QL SMEAR: PRESENT
AST SERPL W P-5'-P-CCNC: 25 U/L (ref 5–45)
BASOPHILS # BLD AUTO: 0 THOUSAND/UL (ref 0–0.1)
BASOPHILS NFR MAR MANUAL: 0 % (ref 0–1)
BILIRUB SERPL-MCNC: 0.8 MG/DL (ref 0.2–1)
BUN SERPL-MCNC: 17 MG/DL (ref 5–25)
CALCIUM SERPL-MCNC: 9.3 MG/DL (ref 8.3–10.1)
CHLORIDE SERPL-SCNC: 108 MMOL/L (ref 98–108)
CO2 SERPL-SCNC: 25 MMOL/L (ref 21–32)
CREAT SERPL-MCNC: 0.9 MG/DL (ref 0.6–1.3)
EOSINOPHIL # BLD AUTO: 0.3 THOUSAND/UL (ref 0–0.61)
EOSINOPHIL NFR BLD MANUAL: 8 % (ref 0–6)
ERYTHROCYTE [DISTWIDTH] IN BLOOD BY AUTOMATED COUNT: 15.2 % (ref 11.6–15.1)
GFR SERPL CREATININE-BSD FRML MDRD: 91 ML/MIN/1.73SQ M
GLUCOSE SERPL-MCNC: 118 MG/DL (ref 65–140)
HCT VFR BLD AUTO: 40 % (ref 36.5–49.3)
HGB BLD-MCNC: 13.7 G/DL (ref 12–17)
LYMPHOCYTES # BLD AUTO: 0.46 THOUSAND/UL (ref 0.6–4.47)
LYMPHOCYTES # BLD AUTO: 12 % (ref 14–44)
MCH RBC QN AUTO: 30.6 PG (ref 26.8–34.3)
MCHC RBC AUTO-ENTMCNC: 34.2 G/DL (ref 31.4–37.4)
MCV RBC AUTO: 89 FL (ref 82–98)
MONOCYTES # BLD AUTO: 0.23 THOUSAND/UL (ref 0–1.22)
MONOCYTES NFR BLD AUTO: 6 % (ref 4–12)
NEUTS BAND NFR BLD MANUAL: 1 % (ref 0–8)
NEUTS SEG # BLD: 2.81 THOUSAND/UL (ref 1.81–6.82)
NEUTS SEG NFR BLD AUTO: 73 % (ref 43–75)
PLATELET # BLD AUTO: 32 THOUSANDS/UL (ref 149–390)
PLATELET BLD QL SMEAR: ABNORMAL
PMV BLD AUTO: 8.5 FL (ref 8.9–12.7)
POTASSIUM SERPL-SCNC: 4 MMOL/L (ref 3.5–5.3)
PROT SERPL-MCNC: 5.6 G/DL (ref 6.4–8.2)
RBC # BLD AUTO: 4.47 MILLION/UL (ref 3.88–5.62)
SODIUM SERPL-SCNC: 145 MMOL/L (ref 136–145)
TOTAL CELLS COUNTED SPEC: 100
WBC # BLD AUTO: 3.8 THOUSAND/UL (ref 4.31–10.16)
WBC NRBC COR # BLD: 3.8 THOUSAND/UL (ref 4.31–10.16)

## 2018-04-16 PROCEDURE — 80053 COMPREHEN METABOLIC PANEL: CPT

## 2018-04-16 PROCEDURE — 85027 COMPLETE CBC AUTOMATED: CPT

## 2018-04-16 PROCEDURE — 85007 BL SMEAR W/DIFF WBC COUNT: CPT

## 2018-04-16 PROCEDURE — 73030 X-RAY EXAM OF SHOULDER: CPT

## 2018-04-17 ENCOUNTER — TELEPHONE (OUTPATIENT)
Dept: HEMATOLOGY ONCOLOGY | Facility: CLINIC | Age: 63
End: 2018-04-17

## 2018-04-17 NOTE — TELEPHONE ENCOUNTER
Called and spoke to Sofi Carter  Let him know about his Xray results per PATRICK Andrade's note  Also, informed Sofi Carter to call his insurance and find a pain management/otho doctor within his network to get an appt  Informed him to call our office with additional questions

## 2018-04-17 NOTE — PROGRESS NOTES
Please call patient and let him know xray showed degenerative/arthritic changes in left shoulder  He could see pain management/ortho for an injection in the joint if he wants  Right shoulder was good; no problems

## 2018-04-17 NOTE — TELEPHONE ENCOUNTER
----- Message from Jose Tran PA-C sent at 4/17/2018  3:22 PM EDT -----  Please call patient and let him know xray showed degenerative/arthritic changes in left shoulder  He could see pain management/ortho for an injection in the joint if he wants  Right shoulder was good; no problems

## 2018-04-23 ENCOUNTER — APPOINTMENT (EMERGENCY)
Dept: CT IMAGING | Facility: HOSPITAL | Age: 63
DRG: 868 | End: 2018-04-23
Payer: MEDICARE

## 2018-04-23 ENCOUNTER — HOSPITAL ENCOUNTER (OUTPATIENT)
Dept: INFUSION CENTER | Facility: CLINIC | Age: 63
Discharge: HOME | DRG: 868 | End: 2018-04-23
Payer: MEDICARE

## 2018-04-23 ENCOUNTER — HOSPITAL ENCOUNTER (EMERGENCY)
Facility: HOSPITAL | Age: 63
Discharge: HOME | DRG: 868 | End: 2018-04-23
Attending: EMERGENCY MEDICINE | Admitting: EMERGENCY MEDICINE
Payer: MEDICARE

## 2018-04-23 ENCOUNTER — APPOINTMENT (EMERGENCY)
Dept: RADIOLOGY | Facility: HOSPITAL | Age: 63
DRG: 868 | End: 2018-04-23
Payer: MEDICARE

## 2018-04-23 ENCOUNTER — TELEPHONE (OUTPATIENT)
Dept: HEMATOLOGY ONCOLOGY | Facility: CLINIC | Age: 63
End: 2018-04-23

## 2018-04-23 ENCOUNTER — HOSPITAL ENCOUNTER (INPATIENT)
Facility: HOSPITAL | Age: 63
LOS: 3 days | Discharge: HOME HEALTH | DRG: 868 | End: 2018-04-27
Attending: EMERGENCY MEDICINE | Admitting: INTERNAL MEDICINE
Payer: MEDICARE

## 2018-04-23 ENCOUNTER — TRANSCRIBE ORDERS (OUTPATIENT)
Dept: ADMINISTRATIVE | Facility: HOSPITAL | Age: 63
End: 2018-04-23

## 2018-04-23 VITALS
SYSTOLIC BLOOD PRESSURE: 157 MMHG | DIASTOLIC BLOOD PRESSURE: 89 MMHG | RESPIRATION RATE: 18 BRPM | HEART RATE: 91 BPM | TEMPERATURE: 100.7 F

## 2018-04-23 VITALS
BODY MASS INDEX: 29.74 KG/M2 | SYSTOLIC BLOOD PRESSURE: 134 MMHG | DIASTOLIC BLOOD PRESSURE: 72 MMHG | RESPIRATION RATE: 16 BRPM | TEMPERATURE: 98.4 F | WEIGHT: 225.4 LBS | OXYGEN SATURATION: 98 % | HEART RATE: 90 BPM

## 2018-04-23 DIAGNOSIS — R10.9 ABDOMINAL PAIN: ICD-10-CM

## 2018-04-23 DIAGNOSIS — C91.10 CLL (CHRONIC LYMPHOCYTIC LEUKEMIA) (HCC): ICD-10-CM

## 2018-04-23 DIAGNOSIS — R50.9 FEVER: Primary | ICD-10-CM

## 2018-04-23 DIAGNOSIS — C91.10 CHRONIC LYMPHOCYTIC LEUKEMIA (HCC): ICD-10-CM

## 2018-04-23 DIAGNOSIS — A32.9 LISTERIA INFECTION: ICD-10-CM

## 2018-04-23 DIAGNOSIS — R50.9 FEVER: ICD-10-CM

## 2018-04-23 DIAGNOSIS — J32.9 SINUSITIS: Primary | ICD-10-CM

## 2018-04-23 LAB
ALBUMIN SERPL BCP-MCNC: 4 G/DL (ref 3.5–5)
ALBUMIN SERPL BCP-MCNC: 4.1 G/DL (ref 3.5–5.7)
ALBUMIN SERPL BCP-MCNC: 4.3 G/DL (ref 3.5–5)
ALP SERPL-CCNC: 44 U/L (ref 46–116)
ALP SERPL-CCNC: 52 U/L (ref 46–116)
ALP SERPL-CCNC: 52 U/L (ref 46–116)
ALT SERPL W P-5'-P-CCNC: 32 U/L (ref 12–78)
ALT SERPL W P-5'-P-CCNC: 39 U/L (ref 12–78)
ALT SERPL W P-5'-P-CCNC: 42 U/L (ref 12–78)
ANION GAP SERPL CALCULATED.3IONS-SCNC: 10 MMOL/L (ref 4–13)
ANION GAP SERPL CALCULATED.3IONS-SCNC: 10 MMOL/L (ref 4–13)
ANION GAP SERPL CALCULATED.3IONS-SCNC: 7 MMOL/L (ref 4–13)
ANISOCYTOSIS BLD QL SMEAR: PRESENT
AST SERPL W P-5'-P-CCNC: 22 U/L (ref 5–45)
AST SERPL W P-5'-P-CCNC: 26 U/L (ref 5–45)
AST SERPL W P-5'-P-CCNC: 29 U/L (ref 5–45)
ATRIAL RATE: 89 BPM
BACTERIA UR QL AUTO: ABNORMAL /HPF
BASOPHILS # BLD AUTO: 0 THOUSAND/UL (ref 0–0.1)
BASOPHILS # BLD AUTO: 0 THOUSANDS/ΜL (ref 0–0.1)
BASOPHILS # BLD MANUAL: 0 THOUSAND/UL (ref 0–0.1)
BASOPHILS NFR BLD AUTO: 0 % (ref 0–1)
BASOPHILS NFR MAR MANUAL: 0 % (ref 0–1)
BASOPHILS NFR MAR MANUAL: 0 % (ref 0–1)
BILIRUB DIRECT SERPL-MCNC: 0.24 MG/DL (ref 0–0.2)
BILIRUB SERPL-MCNC: 0.91 MG/DL (ref 0.2–1)
BILIRUB SERPL-MCNC: 1.2 MG/DL (ref 0.2–1)
BILIRUB SERPL-MCNC: 1.2 MG/DL (ref 0.2–1)
BILIRUB UR QL STRIP: NEGATIVE
BILIRUB UR QL STRIP: NEGATIVE
BUN SERPL-MCNC: 17 MG/DL (ref 5–25)
BUN SERPL-MCNC: 18 MG/DL (ref 5–25)
BUN SERPL-MCNC: 19 MG/DL (ref 5–25)
CALCIUM SERPL-MCNC: 8.4 MG/DL (ref 8.3–10.1)
CALCIUM SERPL-MCNC: 8.7 MG/DL (ref 8.3–10.1)
CALCIUM SERPL-MCNC: 8.7 MG/DL (ref 8.3–10.1)
CHLORIDE SERPL-SCNC: 103 MMOL/L (ref 100–108)
CHLORIDE SERPL-SCNC: 103 MMOL/L (ref 100–108)
CHLORIDE SERPL-SCNC: 107 MMOL/L (ref 98–108)
CLARITY UR: CLEAR
CLARITY UR: CLEAR
CO2 SERPL-SCNC: 26 MMOL/L (ref 21–32)
CO2 SERPL-SCNC: 26 MMOL/L (ref 21–32)
CO2 SERPL-SCNC: 29 MMOL/L (ref 21–32)
COLOR UR: YELLOW
COLOR UR: YELLOW
CREAT SERPL-MCNC: 1.18 MG/DL (ref 0.6–1.3)
CREAT SERPL-MCNC: 1.29 MG/DL (ref 0.6–1.3)
CREAT SERPL-MCNC: 1.3 MG/DL (ref 0.6–1.3)
EOSINOPHIL # BLD AUTO: 0 THOUSAND/UL (ref 0–0.61)
EOSINOPHIL # BLD AUTO: 0 THOUSAND/ΜL (ref 0–0.61)
EOSINOPHIL # BLD MANUAL: 0 THOUSAND/UL (ref 0–0.4)
EOSINOPHIL NFR BLD AUTO: 0 % (ref 0–6)
EOSINOPHIL NFR BLD MANUAL: 0 % (ref 0–6)
EOSINOPHIL NFR BLD MANUAL: 0 % (ref 0–6)
ERYTHROCYTE [DISTWIDTH] IN BLOOD BY AUTOMATED COUNT: 15 % (ref 11.6–15.1)
ERYTHROCYTE [DISTWIDTH] IN BLOOD BY AUTOMATED COUNT: 15.1 % (ref 11.6–15.1)
ERYTHROCYTE [DISTWIDTH] IN BLOOD BY AUTOMATED COUNT: 15.3 % (ref 11.6–15.1)
GFR SERPL CREATININE-BSD FRML MDRD: 58 ML/MIN/1.73SQ M
GFR SERPL CREATININE-BSD FRML MDRD: 59 ML/MIN/1.73SQ M
GFR SERPL CREATININE-BSD FRML MDRD: 65 ML/MIN/1.73SQ M
GLUCOSE SERPL-MCNC: 124 MG/DL (ref 65–140)
GLUCOSE SERPL-MCNC: 126 MG/DL (ref 65–140)
GLUCOSE SERPL-MCNC: 132 MG/DL (ref 65–140)
GLUCOSE UR STRIP-MCNC: NEGATIVE MG/DL
GLUCOSE UR STRIP-MCNC: NEGATIVE MG/DL
HCT VFR BLD AUTO: 38.6 % (ref 36.5–49.3)
HCT VFR BLD AUTO: 41.4 % (ref 36.5–49.3)
HCT VFR BLD AUTO: 41.9 % (ref 36.5–49.3)
HGB BLD-MCNC: 13.3 G/DL (ref 12–17)
HGB BLD-MCNC: 13.6 G/DL (ref 12–17)
HGB BLD-MCNC: 14 G/DL (ref 12–17)
HGB UR QL STRIP.AUTO: ABNORMAL
HGB UR QL STRIP.AUTO: ABNORMAL
IGG SERPL-MCNC: 78 MG/DL (ref 700–1600)
KETONES UR STRIP-MCNC: NEGATIVE MG/DL
KETONES UR STRIP-MCNC: NEGATIVE MG/DL
LACTATE SERPL-SCNC: 0.7 MMOL/L (ref 0.5–2)
LACTATE SERPL-SCNC: 1.1 MMOL/L (ref 0.5–2)
LDH SERPL-CCNC: 243 U/L (ref 81–234)
LEUKOCYTE ESTERASE UR QL STRIP: NEGATIVE
LEUKOCYTE ESTERASE UR QL STRIP: NEGATIVE
LG PLATELETS BLD QL SMEAR: PRESENT
LIPASE SERPL-CCNC: 73 U/L (ref 73–393)
LYMPHOCYTES # BLD AUTO: 0.29 THOUSANDS/ΜL (ref 0.6–4.47)
LYMPHOCYTES # BLD AUTO: 0.3 THOUSAND/UL (ref 0.6–4.47)
LYMPHOCYTES # BLD AUTO: 0.41 THOUSAND/UL (ref 0.6–4.47)
LYMPHOCYTES # BLD AUTO: 10 % (ref 14–44)
LYMPHOCYTES # BLD AUTO: 10 % (ref 14–44)
LYMPHOCYTES NFR BLD AUTO: 12 % (ref 14–44)
MCH RBC QN AUTO: 28.9 PG (ref 26.8–34.3)
MCH RBC QN AUTO: 29.9 PG (ref 26.8–34.3)
MCH RBC QN AUTO: 30.3 PG (ref 26.8–34.3)
MCHC RBC AUTO-ENTMCNC: 32.4 G/DL (ref 31.4–37.4)
MCHC RBC AUTO-ENTMCNC: 33.8 G/DL (ref 31.4–37.4)
MCHC RBC AUTO-ENTMCNC: 34.5 G/DL (ref 31.4–37.4)
MCV RBC AUTO: 88 FL (ref 82–98)
MCV RBC AUTO: 89 FL (ref 82–98)
MCV RBC AUTO: 89 FL (ref 82–98)
MONOCYTES # BLD AUTO: 0.18 THOUSAND/ΜL (ref 0.17–1.22)
MONOCYTES # BLD AUTO: 0.25 THOUSAND/UL (ref 0–1.22)
MONOCYTES # BLD AUTO: 0.3 THOUSAND/UL (ref 0–1.22)
MONOCYTES NFR BLD AUTO: 6 % (ref 4–12)
MONOCYTES NFR BLD AUTO: 7 % (ref 4–12)
MONOCYTES NFR BLD: 10 % (ref 4–12)
NEUTROPHILS # BLD AUTO: 1.97 THOUSANDS/ΜL (ref 1.85–7.62)
NEUTROPHILS # BLD MANUAL: 2.39 THOUSAND/UL (ref 1.85–7.62)
NEUTS BAND NFR BLD MANUAL: 3 % (ref 0–8)
NEUTS BAND NFR BLD MANUAL: 8 % (ref 0–8)
NEUTS SEG # BLD: 3.44 THOUSAND/UL (ref 1.81–6.82)
NEUTS SEG NFR BLD AUTO: 76 % (ref 43–75)
NEUTS SEG NFR BLD AUTO: 77 % (ref 43–75)
NEUTS SEG NFR BLD AUTO: 81 % (ref 43–75)
NITRITE UR QL STRIP: NEGATIVE
NITRITE UR QL STRIP: NEGATIVE
NON-SQ EPI CELLS URNS QL MICRO: ABNORMAL /HPF
NRBC BLD AUTO-RTO: 0 /100 WBCS
NRBC BLD AUTO-RTO: 0 /100 WBCS
OVALOCYTES BLD QL SMEAR: PRESENT
P AXIS: 26 DEGREES
PH UR STRIP.AUTO: 6.5 [PH] (ref 4.5–8)
PH UR STRIP.AUTO: 8.5 [PH] (ref 4.5–8)
PLATELET # BLD AUTO: 25 THOUSANDS/UL (ref 149–390)
PLATELET # BLD AUTO: 26 THOUSANDS/UL (ref 149–390)
PLATELET # BLD AUTO: 30 THOUSANDS/UL (ref 149–390)
PLATELET BLD QL SMEAR: ABNORMAL
PLATELET BLD QL SMEAR: ABNORMAL
PMV BLD AUTO: 11.7 FL (ref 8.9–12.7)
PMV BLD AUTO: 12.3 FL (ref 8.9–12.7)
PMV BLD AUTO: 8.6 FL (ref 8.9–12.7)
POTASSIUM SERPL-SCNC: 3.8 MMOL/L (ref 3.5–5.3)
POTASSIUM SERPL-SCNC: 3.8 MMOL/L (ref 3.5–5.3)
POTASSIUM SERPL-SCNC: 3.9 MMOL/L (ref 3.5–5.3)
PR INTERVAL: 144 MS
PROT SERPL-MCNC: 5.8 G/DL (ref 6.4–8.2)
PROT SERPL-MCNC: 5.8 G/DL (ref 6.4–8.2)
PROT SERPL-MCNC: 6.5 G/DL (ref 6.4–8.2)
PROT UR STRIP-MCNC: NEGATIVE MG/DL
PROT UR STRIP-MCNC: NEGATIVE MG/DL
QRS AXIS: -62 DEGREES
QRSD INTERVAL: 82 MS
QT INTERVAL: 336 MS
QTC INTERVAL: 408 MS
RBC # BLD AUTO: 4.39 MILLION/UL (ref 3.88–5.62)
RBC # BLD AUTO: 4.68 MILLION/UL (ref 3.88–5.62)
RBC # BLD AUTO: 4.68 MILLION/UL (ref 3.88–5.62)
RBC #/AREA URNS AUTO: ABNORMAL /HPF
RETICS # AUTO: ABNORMAL 10*3/UL (ref 14356–105094)
RETICS # CALC: 2.01 % (ref 0.37–1.87)
SODIUM SERPL-SCNC: 139 MMOL/L (ref 136–145)
SODIUM SERPL-SCNC: 139 MMOL/L (ref 136–145)
SODIUM SERPL-SCNC: 143 MMOL/L (ref 136–145)
SP GR UR STRIP.AUTO: 1.01 (ref 1–1.03)
SP GR UR STRIP.AUTO: 1.02 (ref 1–1.03)
T WAVE AXIS: 6 DEGREES
TOTAL CELLS COUNTED SPEC: 100
TOTAL CELLS COUNTED SPEC: 100
TROPONIN I SERPL-MCNC: <0.02 NG/ML
URATE SERPL-MCNC: 4.9 MG/DL (ref 4.2–8)
UROBILINOGEN UR QL STRIP.AUTO: 1 E.U./DL
UROBILINOGEN UR QL STRIP.AUTO: 1 E.U./DL
VENTRICULAR RATE: 89 BPM
WBC # BLD AUTO: 2.44 THOUSAND/UL (ref 4.31–10.16)
WBC # BLD AUTO: 2.99 THOUSAND/UL (ref 4.31–10.16)
WBC # BLD AUTO: 4.1 THOUSAND/UL (ref 4.31–10.16)
WBC #/AREA URNS AUTO: ABNORMAL /HPF
WBC NRBC COR # BLD: 4.1 THOUSAND/UL (ref 4.31–10.16)

## 2018-04-23 PROCEDURE — 82248 BILIRUBIN DIRECT: CPT

## 2018-04-23 PROCEDURE — 74177 CT ABD & PELVIS W/CONTRAST: CPT

## 2018-04-23 PROCEDURE — 87077 CULTURE AEROBIC IDENTIFY: CPT | Performed by: EMERGENCY MEDICINE

## 2018-04-23 PROCEDURE — 96361 HYDRATE IV INFUSION ADD-ON: CPT

## 2018-04-23 PROCEDURE — 84484 ASSAY OF TROPONIN QUANT: CPT | Performed by: EMERGENCY MEDICINE

## 2018-04-23 PROCEDURE — 83615 LACTATE (LD) (LDH) ENZYME: CPT

## 2018-04-23 PROCEDURE — 81001 URINALYSIS AUTO W/SCOPE: CPT

## 2018-04-23 PROCEDURE — 85025 COMPLETE CBC W/AUTO DIFF WBC: CPT | Performed by: EMERGENCY MEDICINE

## 2018-04-23 PROCEDURE — 93010 ELECTROCARDIOGRAM REPORT: CPT | Performed by: INTERNAL MEDICINE

## 2018-04-23 PROCEDURE — 85045 AUTOMATED RETICULOCYTE COUNT: CPT

## 2018-04-23 PROCEDURE — 87631 RESP VIRUS 3-5 TARGETS: CPT | Performed by: EMERGENCY MEDICINE

## 2018-04-23 PROCEDURE — 99285 EMERGENCY DEPT VISIT HI MDM: CPT

## 2018-04-23 PROCEDURE — 96375 TX/PRO/DX INJ NEW DRUG ADDON: CPT

## 2018-04-23 PROCEDURE — 99284 EMERGENCY DEPT VISIT MOD MDM: CPT

## 2018-04-23 PROCEDURE — 85007 BL SMEAR W/DIFF WBC COUNT: CPT | Performed by: EMERGENCY MEDICINE

## 2018-04-23 PROCEDURE — 85027 COMPLETE CBC AUTOMATED: CPT | Performed by: EMERGENCY MEDICINE

## 2018-04-23 PROCEDURE — 85027 COMPLETE CBC AUTOMATED: CPT

## 2018-04-23 PROCEDURE — 82948 REAGENT STRIP/BLOOD GLUCOSE: CPT

## 2018-04-23 PROCEDURE — 85007 BL SMEAR W/DIFF WBC COUNT: CPT

## 2018-04-23 PROCEDURE — 83605 ASSAY OF LACTIC ACID: CPT | Performed by: EMERGENCY MEDICINE

## 2018-04-23 PROCEDURE — 87040 BLOOD CULTURE FOR BACTERIA: CPT | Performed by: EMERGENCY MEDICINE

## 2018-04-23 PROCEDURE — 36415 COLL VENOUS BLD VENIPUNCTURE: CPT | Performed by: EMERGENCY MEDICINE

## 2018-04-23 PROCEDURE — 80053 COMPREHEN METABOLIC PANEL: CPT | Performed by: EMERGENCY MEDICINE

## 2018-04-23 PROCEDURE — 82784 ASSAY IGA/IGD/IGG/IGM EACH: CPT

## 2018-04-23 PROCEDURE — 80053 COMPREHEN METABOLIC PANEL: CPT

## 2018-04-23 PROCEDURE — 84550 ASSAY OF BLOOD/URIC ACID: CPT

## 2018-04-23 PROCEDURE — 83690 ASSAY OF LIPASE: CPT | Performed by: EMERGENCY MEDICINE

## 2018-04-23 PROCEDURE — 81001 URINALYSIS AUTO W/SCOPE: CPT | Performed by: EMERGENCY MEDICINE

## 2018-04-23 PROCEDURE — 96374 THER/PROPH/DIAG INJ IV PUSH: CPT

## 2018-04-23 PROCEDURE — 81002 URINALYSIS NONAUTO W/O SCOPE: CPT | Performed by: EMERGENCY MEDICINE

## 2018-04-23 PROCEDURE — 96360 HYDRATION IV INFUSION INIT: CPT

## 2018-04-23 PROCEDURE — 71046 X-RAY EXAM CHEST 2 VIEWS: CPT

## 2018-04-23 PROCEDURE — 93005 ELECTROCARDIOGRAM TRACING: CPT | Performed by: EMERGENCY MEDICINE

## 2018-04-23 RX ORDER — ONDANSETRON 2 MG/ML
4 INJECTION INTRAMUSCULAR; INTRAVENOUS ONCE
Status: COMPLETED | OUTPATIENT
Start: 2018-04-23 | End: 2018-04-23

## 2018-04-23 RX ORDER — LEVOFLOXACIN 750 MG/1
750 TABLET ORAL DAILY
Qty: 4 TABLET | Refills: 0 | Status: SHIPPED | OUTPATIENT
Start: 2018-04-23 | End: 2018-04-27 | Stop reason: HOSPADM

## 2018-04-23 RX ORDER — HYDRALAZINE HYDROCHLORIDE 20 MG/ML
5 INJECTION INTRAMUSCULAR; INTRAVENOUS ONCE
Status: DISCONTINUED | OUTPATIENT
Start: 2018-04-23 | End: 2018-04-23

## 2018-04-23 RX ORDER — PREDNISONE 10 MG/1
10 TABLET ORAL DAILY
COMMUNITY
End: 2018-07-03 | Stop reason: SDUPTHER

## 2018-04-23 RX ORDER — MORPHINE SULFATE 2 MG/ML
2 INJECTION, SOLUTION INTRAMUSCULAR; INTRAVENOUS ONCE
Status: COMPLETED | OUTPATIENT
Start: 2018-04-23 | End: 2018-04-23

## 2018-04-23 RX ORDER — ACETAMINOPHEN 325 MG/1
975 TABLET ORAL ONCE
Status: COMPLETED | OUTPATIENT
Start: 2018-04-23 | End: 2018-04-23

## 2018-04-23 RX ADMIN — LEVOFLOXACIN 750 MG: 500 TABLET, FILM COATED ORAL at 14:44

## 2018-04-23 RX ADMIN — MORPHINE SULFATE 2 MG: 2 INJECTION, SOLUTION INTRAMUSCULAR; INTRAVENOUS at 21:19

## 2018-04-23 RX ADMIN — IOHEXOL 100 ML: 350 INJECTION, SOLUTION INTRAVENOUS at 22:07

## 2018-04-23 RX ADMIN — SODIUM CHLORIDE 1000 ML: 0.9 INJECTION, SOLUTION INTRAVENOUS at 11:48

## 2018-04-23 RX ADMIN — ACETAMINOPHEN 975 MG: 325 TABLET, FILM COATED ORAL at 11:47

## 2018-04-23 RX ADMIN — ONDANSETRON 4 MG: 2 INJECTION INTRAMUSCULAR; INTRAVENOUS at 21:18

## 2018-04-23 RX ADMIN — SODIUM CHLORIDE 1000 ML: 0.9 INJECTION, SOLUTION INTRAVENOUS at 21:18

## 2018-04-23 NOTE — TELEPHONE ENCOUNTER
Fever 100 7 at Infusion this monring  Then at Urgent care, it was 101 7  They sent him on Er  He is now on his way to [facility]  Calling to just letting Dr Warren Santos know

## 2018-04-23 NOTE — PROGRESS NOTES
Received Critical Value, Platelets 14,215  RN called the office of Dr Tej Capps and spoke with Chris Ochoa who stated she will provide Judy Leigh RN with Lab value  Lab value read back accurately to RN

## 2018-04-23 NOTE — DISCHARGE INSTRUCTIONS
Tylenol/motrin for fever, drink plenty of fluids  Back to ER for any worsening of symptoms    Fever in Adults, Ambulatory Care   GENERAL INFORMATION:   A fever  is an increase in body temperature above 100 4°F (38°C)  Fever may be caused by infection, inflammatory disorders, or the cause may not be known  Other signs and symptoms may include any of the following:   · Chills and shivers     · Muscle stiffness    · Weight loss    · Night sweats    · Fever that comes and goes    · Fever that is higher in the morning  Seek immediate care for the following symptoms:   · Shortness of breath or chest pain    · Blue skin, lips, or nails    · Stiff neck and a bad headache    · Fever does not go away or gets worse even after treatment    · Confusion    · Urinate only very small amounts or not at all    · Heart beats faster than normal even after treatment  Treatment for a fever  may include medicine to decrease your fever  You may also need medicine to treat an infection caused by bacteria  Ask your healthcare provider for more information about the medicines you are given and how to use them safely  Manage your fever:   · Take a bath  in cool or lukewarm water  · Use an ice pack  wrapped in a small towel or wet a washcloth with cool water  Place the ice pack or wet washcloth on your forehead or the back of your neck  · Drink liquids as directed  Ask how much liquid to drink each day and which liquids are best for you  Liquids will help prevent dehydration  Follow up with your healthcare provider as directed:  Write down your questions so you remember to ask them during your visits  CARE AGREEMENT:   You have the right to help plan your care  Learn about your health condition and how it may be treated  Discuss treatment options with your caregivers to decide what care you want to receive  You always have the right to refuse treatment  The above information is an  only   It is not intended as medical advice for individual conditions or treatments  Talk to your doctor, nurse or pharmacist before following any medical regimen to see if it is safe and effective for you  © 2014 [address] is for End User's use only and may not be sold, redistributed or otherwise used for commercial purposes  All illustrations and images included in CareNotes® are the copyrighted property of [employer]  or Maximilian Chaitanya  Sinusitis   WHAT YOU NEED TO KNOW:   Sinusitis is inflammation or infection of your sinuses  It is most often caused by a virus  Acute sinusitis may last up to 12 weeks  Chronic sinusitis lasts longer than 12 weeks  Recurrent sinusitis means you have 4 or more times in 1 year  DISCHARGE INSTRUCTIONS:   Return to the emergency department if:   · Your eye and eyelid are red, swollen, and painful  · You cannot open your eye  · You have vision changes, such as double vision  · Your eyeball bulges out or you cannot move your eye  · You are more sleepy than normal, or you notice changes in your ability to think, move, or talk  · You have a stiff neck, a fever, or a bad headache  · You have swelling of your forehead or scalp  Contact your healthcare provider if:   · Your symptoms do not improve after 3 days  · Your symptoms do not go away after 10 days  · You have nausea and are vomiting  · Your nose is bleeding  · You have questions or concerns about your condition or care  Medicines: Your symptoms may go away on their own  Your healthcare provider may recommend watchful waiting for up to 10 days before starting antibiotics  You may  need any of the following:  · Acetaminophen  decreases pain and fever  It is available without a doctor's order  Ask how much to take and how often to take it  Follow directions   Read the labels of all other medicines you are using to see if they also contain acetaminophen, or ask your doctor or pharmacist  Acetaminophen can cause liver damage if not taken correctly  Do not use more than 4 grams (4,000 milligrams) total of acetaminophen in one day  · NSAIDs , such as ibuprofen, help decrease swelling, pain, and fever  This medicine is available with or without a doctor's order  NSAIDs can cause stomach bleeding or kidney problems in certain people  If you take blood thinner medicine, always ask your healthcare provider if NSAIDs are safe for you  Always read the medicine label and follow directions  · Nasal steroid sprays  may help decrease inflammation in your nose and sinuses  · Decongestants  help reduce swelling and drain mucus in the nose and sinuses  They may help you breathe easier  · Antihistamines  help dry mucus in the nose and relieve sneezing  · Antibiotics  help treat or prevent a bacterial infection  · Take your medicine as directed  Contact your healthcare provider if you think your medicine is not helping or if you have side effects  Tell him or her if you are allergic to any medicine  Keep a list of the medicines, vitamins, and herbs you take  Include the amounts, and when and why you take them  Bring the list or the pill bottles to follow-up visits  Carry your medicine list with you in case of an emergency  Self-care:   · Rinse your sinuses  Use a sinus rinse device to rinse your nasal passages with a saline (salt water) solution or distilled water  Do not use tap water  This will help thin the mucus in your nose and rinse away pollen and dirt  It will also help reduce swelling so you can breathe normally  Ask your healthcare provider how often to do this  · Breathe in steam   Heat a bowl of water until you see steam  Lean over the bowl and make a tent over your head with a large towel  Breathe deeply for about 20 minutes  Be careful not to get too close to the steam or burn yourself  Do this 3 times a day  You can also breathe deeply when you take a hot shower  · Sleep with your head elevated  Place an extra pillow under your head before you go to sleep to help your sinuses drain  · Drink liquids as directed  Ask your healthcare provider how much liquid to drink each day and which liquids are best for you  Liquids will thin the mucus in your nose and help it drain  Avoid drinks that contain alcohol or caffeine  · Do not smoke, and avoid secondhand smoke  Nicotine and other chemicals in cigarettes and cigars can make your symptoms worse  Ask your healthcare provider for information if you currently smoke and need help to quit  E-cigarettes or smokeless tobacco still contain nicotine  Talk to your healthcare provider before you use these products  Prevent the spread of germs that cause sinusitis:  Wash your hands often with soap and water  Wash your hands after you use the bathroom, change a child's diaper, or sneeze  Wash your hands before you prepare or eat food  Follow up with your healthcare provider as directed: You may be referred to an ear, nose, and throat specialist  Write down your questions so you remember to ask them during your visits  © 2017 [facility] Information is for End User's use only and may not be sold, redistributed or otherwise used for commercial purposes  All illustrations and images included in CareNotes® are the copyrighted property of A D A M [employer]  or Maximilian Tavares  The above information is an  only  It is not intended as medical advice for individual conditions or treatments  Talk to your doctor, nurse or pharmacist before following any medical regimen to see if it is safe and effective for you

## 2018-04-23 NOTE — ED PROVIDER NOTES
History  Chief Complaint   Patient presents with    Fever - 9 weeks to 74 years     pt presents for evaluation of fever, pt was at infusion center and they reports fever of 101  pt c/o joint pain  pt reports currently receiving IV and PO chemotherapy  C/o fever since yest  - pt  Was sent over from the infusion center (was getting bloodwork today)  Tmax 101 7 this am   +congestion charlie  Last 3 days  H/o CLL (diagnosed 18 years ago) - once a month - gets an infusion (last dose was 3 weeks ago) , chemo pills every day     no pain with urination    Pt 's oncologist is Dr Sheila Baird  Prior to Admission Medications   Prescriptions Last Dose Informant Patient Reported? Taking?    Ibrutinib 140 MG CAPS  Self No Yes   Sig: Take 1 capsule by mouth daily   Lancets (FREESTYLE) lancets  Self No Yes   Sig: Use as instructed--test 2 times a day    E 11 9   VENTOLIN  (90 Base) MCG/ACT inhaler  Self Yes Yes   Sig: INHALE 2 PUFFS 4 TIMES A DAY AS NEEDED   acetaminophen (TYLENOL) 325 mg tablet  Self No Yes   Sig: Take 2 tablets by mouth every 6 (six) hours as needed for fever (fever > 101 4)   acyclovir (ZOVIRAX) 400 MG tablet  Self Yes Yes   Sig: Take 400 mg by mouth 2 (two) times a day   aspirin 81 MG tablet  Self Yes Yes   Sig: Take 81 mg by mouth every other day Every other day    citalopram (CeleXA) 40 mg tablet  Self Yes Yes   Sig: Take 40 mg by mouth daily   fenofibrate (TRIGLIDE) 50 MG tablet  Self Yes Yes   Sig: Take 134 mg by mouth daily     folic acid (FOLVITE) 1 mg tablet  Self Yes Yes   Sig: Take 1 mg by mouth daily   gabapentin (NEURONTIN) 300 mg capsule  Self No Yes   Sig: Take 2 capsules by mouth 3 (three) times a day   Patient taking differently: Take 600 mg by mouth 2 (two) times a day     glucose monitoring kit (FREESTYLE) monitoring kit  Self No Yes   Si each by Does not apply route as needed ( ) E 11 9   insulin glargine (LANTUS) 100 units/mL subcutaneous injection  Self No Yes   Sig: Inject 15 Units under the skin every morning   Patient taking differently: Inject 12 Units under the skin every morning     insulin lispro (HumaLOG) 100 units/mL injection  Self No Yes   Sig: Inject 10 Units under the skin 3 (three) times a day with meals   multivitamin (THERAGRAN) TABS  Self Yes Yes   Sig: Take 1 tablet by mouth daily   obinutuzumab (GAZYVA) 1000 mg/40 mL SOLN  Self Yes Yes   Sig: Infuse into a venous catheter   pantoprazole (PROTONIX) 40 mg tablet  Self Yes Yes   Sig: Take 40 mg by mouth daily        Facility-Administered Medications: None       Past Medical History:   Diagnosis Date    CAD (coronary artery disease) 2004    Cellulitis of scalp     CKD (chronic kidney disease) stage 3, GFR 30-59 ml/min     CLL [facility]ch[facility]ocytic leukemia) ([facility]     COPD (chronic obstructive pulmonary disease) ([facility] )     Diabetes mellitus ([facility] )     H/O blood clots     Hemolytic anemia (HCC)     History of TIA (transient ischemic attack)     Hyperlipidemia     Hypertension     Multiple gastric ulcers     Myocardial infarction ([facility] )     Recurrent sinusitis     Thrombocytopenia (HCC)     Vertigo        Past Surgical History:   Procedure Laterality Date    ARTHROSCOPIC REPAIR ACL      lt knee    CARDIAC SURGERY      cardiac cath with stent    KNEE ARTHROSCOPY      PORTACATH PLACEMENT      IA ESOPHAGOGASTRODUODENOSCOPY TRANSORAL DIAGNOSTIC N/A 10/5/2017    Procedure: ESOPHAGOGASTRODUODENOSCOPY (EGD) with bx;  Surgeon: Aylin Colin DO;  Location: [facility]; Service: Gastroenterology    IA ESOPHAGOGASTRODUODENOSCOPY TRANSORAL DIAGNOSTIC N/A 3/8/2018    Procedure: ESOPHAGOGASTRODUODENOSCOPY (EGD) with biopsy;  Surgeon: Aylin Colin DO;  Location: [facility];   Service: Gastroenterology    IA ESOPHAGOSCOPY FLEXIBLE TRANSORAL WITH BIOPSY N/A 9/2/2016    Procedure: ESOPHAGOGASTRODUODENOSCOPY (EGD); ESOPHAGEAL DILATION; ESOPHAGEAL BIOPSY;  Surgeon: Beto Lyman MD;  Location: [facility]; Service: Thoracic    TONSILLECTOMY      UPPER GASTROINTESTINAL ENDOSCOPY      x 6       No family history on file  I have reviewed and agree with the history as documented  Social History   Substance Use Topics    Smoking status: Former Smoker    Smokeless tobacco: Never Used      Comment: pt refuses to answer question    Alcohol use No        Review of Systems   Constitutional: Positive for fever  Negative for appetite change and fatigue  HENT: Positive for congestion  Negative for sore throat  Respiratory: Negative for cough, shortness of breath and wheezing  Cardiovascular: Negative for chest pain and leg swelling  Gastrointestinal: Negative for abdominal pain, diarrhea and vomiting  Genitourinary: Negative for dysuria and flank pain  Musculoskeletal: Negative for back pain and neck pain  Skin: Negative for rash  Neurological: Negative for syncope and headaches  Psychiatric/Behavioral:        Mood normal       Physical Exam  ED Triage Vitals   Temperature Pulse Respirations Blood Pressure SpO2   04/23/18 1028 04/23/18 1028 04/23/18 1028 04/23/18 1028 04/23/18 1028   99 8 °F (37 7 °C) 101 19 142/78 97 %      Temp Source Heart Rate Source Patient Position - Orthostatic VS BP Location FiO2 (%)   04/23/18 1028 04/23/18 1028 04/23/18 1028 04/23/18 1028 --   Oral Monitor Sitting Right arm       Pain Score       04/23/18 1112       6           Orthostatic Vital Signs  Vitals:    04/23/18 1028 04/23/18 1212 04/23/18 1436 04/23/18 1451   BP: 142/78 147/84 138/66 134/72   Pulse: 101 104 83 90   Patient Position - Orthostatic VS: Sitting Lying  Sitting       Physical Exam   Constitutional: He is oriented to person, place, and time  He appears well-developed and well-nourished  HENT:   Head: Normocephalic and atraumatic  Mouth/Throat: Oropharynx is clear and moist    Neck: Normal range of motion  Neck supple  Cardiovascular: Normal rate and regular rhythm      Pulmonary/Chest: Effort normal and breath sounds normal  No respiratory distress  He has no wheezes  Abdominal: Soft  There is no tenderness  Musculoskeletal: Normal range of motion  Neurological: He is alert and oriented to person, place, and time  Skin: Skin is warm and dry  Port in L chest without surrounding erythema or tenderness   Nursing note and vitals reviewed        ED Medications  Medications   sodium chloride 0 9 % bolus 1,000 mL (0 mL Intravenous Stopped 4/23/18 1458)   acetaminophen (TYLENOL) tablet 975 mg (975 mg Oral Given 4/23/18 1147)   levofloxacin (LEVAQUIN) tablet 750 mg (750 mg Oral Given 4/23/18 1444)       Diagnostic Studies  Results Reviewed     Procedure Component Value Units Date/Time    Urine Microscopic [[id number]]  (Abnormal) Collected:  04/23/18 1220    Lab Status:  Final result Specimen:  Urine from Urine, Other Updated:  04/23/18 1421     RBC, UA 4-10 (A) /hpf      WBC, UA 0-1 (A) /hpf      Epithelial Cells Occasional /hpf      Bacteria, UA None Seen /hpf     UA w Reflex to Microscopic w Reflex to Culture [[id number]]  (Abnormal) Collected:  04/23/18 1220    Lab Status:  Final result Specimen:  Urine from Urine, Other Updated:  04/23/18 1330     Color, UA Yellow     Clarity, UA Clear     Specific [place], UA 1 020     pH, UA 6 5     Leukocytes, UA Negative     Nitrite, UA Negative     Protein, UA Negative mg/dl      Glucose, UA Negative mg/dl      Ketones, UA Negative mg/dl      Urobilinogen, UA 1 0 E U /dl      Bilirubin, UA Negative     Blood, UA Small (A)    CBC and differential [[id number]]  (Abnormal) Collected:  04/23/18 1132    Lab Status:  Final result Specimen:  Blood from Arm, Right Updated:  04/23/18 1219     WBC 2 99 (L) Thousand/uL      RBC 4 68 Million/uL      Hemoglobin 14 0 g/dL      Hematocrit 41 4 %      MCV 89 fL      MCH 29 9 pg      MCHC 33 8 g/dL      RDW 15 3 (H) %      MPV 12 3 fL      Platelets 30 (LL) Thousands/uL      nRBC 0 /100 WBCs     Comprehensive metabolic panel [[id number]] (Abnormal) Collected:  04/23/18 1132    Lab Status:  Final result Specimen:  Blood from Arm, Right Updated:  04/23/18 1202     Sodium 139 mmol/L      Potassium 3 8 mmol/L      Chloride 103 mmol/L      CO2 29 mmol/L      Anion Gap 7 mmol/L      BUN 19 mg/dL      Creatinine 1 29 mg/dL      Glucose 124 mg/dL      Calcium 8 7 mg/dL      AST 22 U/L      ALT 39 U/L      Alkaline Phosphatase 52 U/L      Total Protein 6 5 g/dL      Albumin 4 3 g/dL      Total Bilirubin 1 20 (H) mg/dL      eGFR 59 ml/min/1 73sq m     Narrative:         National Kidney Disease Education Program recommendations are as follows:  GFR calculation is accurate only with a steady state creatinine  Chronic Kidney disease less than 60 ml/min/1 73 sq  meters  Kidney failure less than 15 ml/min/1 73 sq  meters  Troponin I [[id number]]  (Normal) Collected:  04/23/18 1132    Lab Status:  Final result Specimen:  Blood from Arm, Right Updated:  04/23/18 1158     Troponin I <0 02 ng/mL     Narrative:         [employer] Chemistry analyzer 99% cutoff is > 0 04 ng/mL in network labs    o cTnI 99% cutoff is useful only when applied to patients in the clinical setting of myocardial ischemia  o cTnI 99% cutoff should be interpreted in the context of clinical history, ECG findings and possibly cardiac imaging to establish correct diagnosis  o cTnI 99% cutoff may be suggestive but clearly not indicative of a coronary event without the clinical setting of myocardial ischemia  Lactic acid, plasma [[id number]]  (Normal) Collected:  04/23/18 1132    Lab Status:  Final result Specimen:  Blood from Arm, Right Updated:  04/23/18 1155     LACTIC ACID 1 1 mmol/L     Narrative:         Result may be elevated if tourniquet was used during collection  Blood culture #2 [[id number]] Collected:  04/23/18 1132    Lab Status: In process Specimen:  Blood from Hand, Right Updated:  04/23/18 1136    Blood culture #1 [[id number]] Collected:  04/23/18 1132    Lab Status:   In process Specimen:  Blood from Arm, Right Updated:  04/23/18 1135                 XR chest 2 views   Final Result by Gwyn Garland DO (04/23 1201)      No acute cardiopulmonary disease  Workstation performed: [id number]                    Procedures  Procedures       Phone Contacts  ED Phone Contact    ED Course  ED Course                                MDM  Number of Diagnoses or Management Options  Fever:   Sinusitis:      Amount and/or Complexity of Data Reviewed  Clinical lab tests: ordered and reviewed  Tests in the radiology section of CPT®: ordered and reviewed    Risk of Complications, Morbidity, and/or Mortality  Presenting problems: moderate  General comments: I spoke to dr Kenyatta Carl and went over case, studies, labs  He agrees with outpt   management  Pt  Does not want to stay in the hospital   Will treat for sinusitis with levaquin and encourage tylenol and fluids for fever  If symptoms persist  Or worsen, he understands to return for admission further work up  CritCare Time    Disposition  Final diagnoses:   Sinusitis   Fever     Time reflects when diagnosis was documented in both MDM as applicable and the Disposition within this note     Time User Action Codes Description Comment    4/23/2018  2:17 PM Divya BOLANOS Add [J32 9] Sinusitis     4/23/2018  2:17 PM Jose Rios Add [R50 9] Fever       ED Disposition     ED Disposition Condition Comment    Discharge  Cayetano Lucero discharge to home/self care      Condition at discharge: Stable        Follow-up Information     Follow up With Specialties Details Why Emmanuel Michaels MD Internal Medicine   [address] [place] [address] [address]      Cheo Esteves MD Hematology and Oncology   [address] [place] [address]  [phone]          Discharge Medication List as of 4/23/2018  2:19 PM      CONTINUE these medications which have NOT CHANGED    Details acetaminophen (TYLENOL) 325 mg tablet Take 2 tablets by mouth every 6 (six) hours as needed for fever (fever > 101 4), Starting Fri 11/3/2017, Normal      acyclovir (ZOVIRAX) 400 MG tablet Take 400 mg by mouth 2 (two) times a day, Historical Med      aspirin 81 MG tablet Take 81 mg by mouth every other day Every other day , Historical Med      citalopram (CeleXA) 40 mg tablet Take 40 mg by mouth daily, Historical Med      fenofibrate (TRIGLIDE) 50 MG tablet Take 134 mg by mouth daily  , Historical Med      folic acid (FOLVITE) 1 mg tablet Take 1 mg by mouth daily, Historical Med      gabapentin (NEURONTIN) 300 mg capsule Take 2 capsules by mouth 3 (three) times a day, Starting Fri 11/3/2017, Normal      glucose monitoring kit (FREESTYLE) monitoring kit 1 each by Does not apply route as needed ( ) E 11 9, Starting Wed 9/20/2017, Print      Ibrutinib 140 MG CAPS Take 1 capsule by mouth daily, Starting Tue 3/27/2018, Normal      insulin glargine (LANTUS) 100 units/mL subcutaneous injection Inject 15 Units under the skin every morning, Starting Wed 9/20/2017, Print      insulin lispro (HumaLOG) 100 units/mL injection Inject 10 Units under the skin 3 (three) times a day with meals, Starting Sat 10/14/2017, No Print      Lancets (FREESTYLE) lancets Use as instructed--test 2 times a day    E 11 9, Print      multivitamin (THERAGRAN) TABS Take 1 tablet by mouth daily, Historical Med      obinutuzumab (GAZYVA) 1000 mg/40 mL SOLN Infuse into a venous catheter, Historical Med      pantoprazole (PROTONIX) 40 mg tablet Take 40 mg by mouth daily  , Historical Med      VENTOLIN  (90 Base) MCG/ACT inhaler INHALE 2 PUFFS 4 TIMES A DAY AS NEEDED, Historical Med      predniSONE 20 mg tablet Take 1 tablet by mouth daily, Starting Wed 9/20/2017, No Print           No discharge procedures on file      ED Provider  Electronically Signed by           Bk Olivier MD  04/24/18 4118

## 2018-04-23 NOTE — PROGRESS NOTES
Pt  Quinten Coronado waking with fever 101 7 along with chills this morning  Pt  States he has not felt good with increasing generalized body aches verbalizing joint and muscle pain as well  Pt  Also verbalizes nose bleeding whenever blowing his nose  Called office of Dr Francesca Springer and spoke with Alyssa Leonard RN  Per Carina Sun, pt should be seen by PCP and if not able to get in with PCP, Urgent Care is recommended  RN directed patient as instructed by office of Dr Francesca Springer  Labs obtained as ordered at this time  Reviewed future appointments    Declined AVS

## 2018-04-23 NOTE — PLAN OF CARE
Problem: PAIN - ADULT  Goal: Verbalizes/displays adequate comfort level or baseline comfort level  Interventions:  - Encourage patient to monitor pain and request assistance  - Assess pain using appropriate pain scale  - Administer analgesics based on type and severity of pain and evaluate response  - Implement non-pharmacological measures as appropriate and evaluate response  - Consider cultural and social influences on pain and pain management  - Notify physician/advanced practitioner if interventions unsuccessful or patient reports new pain  Outcome: Progressing      Problem: INFECTION - ADULT  Goal: Absence or prevention of progression during hospitalization  INTERVENTIONS:  - Assess and monitor for signs and symptoms of infection  - Monitor lab/diagnostic results  - Monitor all insertion sites, i e  indwelling lines, tubes, and drains  - Monitor endotracheal (as able) and nasal secretions for changes in amount and color  - [place] appropriate cooling/warming therapies per order  - Administer medications as ordered  - Instruct and encourage patient and family to use good hand hygiene technique  - Identify and instruct in appropriate isolation precautions for identified infection/condition  Outcome: Progressing    Goal: Absence of fever/infection during neutropenic period  INTERVENTIONS:  - Monitor WBC  - Implement neutropenic guidelines  Outcome: Progressing      Problem: Knowledge Deficit  Goal: Patient/family/caregiver demonstrates understanding of disease process, treatment plan, medications, and discharge instructions  Complete learning assessment and assess knowledge base    Interventions:  - Provide teaching at level of understanding  - Provide teaching via preferred learning methods  Outcome: Progressing

## 2018-04-24 ENCOUNTER — TELEPHONE (OUTPATIENT)
Dept: HEMATOLOGY ONCOLOGY | Facility: CLINIC | Age: 63
End: 2018-04-24

## 2018-04-24 ENCOUNTER — TELEPHONE (OUTPATIENT)
Dept: ENDOCRINOLOGY | Facility: CLINIC | Age: 63
End: 2018-04-24

## 2018-04-24 PROBLEM — R10.9 ABDOMINAL PAIN: Status: ACTIVE | Noted: 2018-04-24

## 2018-04-24 LAB
AMORPH PHOS CRY URNS QL MICRO: ABNORMAL /HPF
ANION GAP SERPL CALCULATED.3IONS-SCNC: 10 MMOL/L (ref 4–13)
BACTERIA UR QL AUTO: ABNORMAL /HPF
BUN SERPL-MCNC: 15 MG/DL (ref 5–25)
CALCIUM SERPL-MCNC: 8.7 MG/DL (ref 8.3–10.1)
CHLORIDE SERPL-SCNC: 105 MMOL/L (ref 100–108)
CO2 SERPL-SCNC: 26 MMOL/L (ref 21–32)
CREAT SERPL-MCNC: 1.1 MG/DL (ref 0.6–1.3)
ERYTHROCYTE [DISTWIDTH] IN BLOOD BY AUTOMATED COUNT: 15.2 % (ref 11.6–15.1)
FLUAV AG SPEC QL: NORMAL
FLUBV AG SPEC QL: NORMAL
GFR SERPL CREATININE-BSD FRML MDRD: 71 ML/MIN/1.73SQ M
GLUCOSE P FAST SERPL-MCNC: 118 MG/DL (ref 65–99)
GLUCOSE SERPL-MCNC: 109 MG/DL (ref 65–140)
GLUCOSE SERPL-MCNC: 116 MG/DL (ref 65–140)
GLUCOSE SERPL-MCNC: 118 MG/DL (ref 65–140)
GLUCOSE SERPL-MCNC: 119 MG/DL (ref 65–140)
GLUCOSE SERPL-MCNC: 142 MG/DL (ref 65–140)
GLUCOSE SERPL-MCNC: 217 MG/DL (ref 65–140)
GLUCOSE SERPL-MCNC: 224 MG/DL (ref 65–140)
GLUCOSE SERPL-MCNC: 240 MG/DL (ref 65–140)
HCT VFR BLD AUTO: 38.8 % (ref 36.5–49.3)
HGB BLD-MCNC: 13.1 G/DL (ref 12–17)
MCH RBC QN AUTO: 29.9 PG (ref 26.8–34.3)
MCHC RBC AUTO-ENTMCNC: 33.8 G/DL (ref 31.4–37.4)
MCV RBC AUTO: 89 FL (ref 82–98)
MUCOUS THREADS UR QL AUTO: ABNORMAL
NON-SQ EPI CELLS URNS QL MICRO: ABNORMAL /HPF
PLATELET # BLD AUTO: 27 THOUSANDS/UL (ref 149–390)
PMV BLD AUTO: 12.3 FL (ref 8.9–12.7)
POTASSIUM SERPL-SCNC: 3.8 MMOL/L (ref 3.5–5.3)
RBC # BLD AUTO: 4.38 MILLION/UL (ref 3.88–5.62)
RBC #/AREA URNS AUTO: ABNORMAL /HPF
RSV B RNA SPEC QL NAA+PROBE: NORMAL
SODIUM SERPL-SCNC: 141 MMOL/L (ref 136–145)
WBC # BLD AUTO: 1.83 THOUSAND/UL (ref 4.31–10.16)
WBC #/AREA URNS AUTO: ABNORMAL /HPF

## 2018-04-24 PROCEDURE — 85027 COMPLETE CBC AUTOMATED: CPT | Performed by: PHYSICIAN ASSISTANT

## 2018-04-24 PROCEDURE — 82948 REAGENT STRIP/BLOOD GLUCOSE: CPT

## 2018-04-24 PROCEDURE — 99223 1ST HOSP IP/OBS HIGH 75: CPT | Performed by: INTERNAL MEDICINE

## 2018-04-24 PROCEDURE — 99221 1ST HOSP IP/OBS SF/LOW 40: CPT | Performed by: INTERNAL MEDICINE

## 2018-04-24 PROCEDURE — 80048 BASIC METABOLIC PNL TOTAL CA: CPT | Performed by: PHYSICIAN ASSISTANT

## 2018-04-24 RX ORDER — CITALOPRAM 20 MG/1
40 TABLET ORAL DAILY
Status: DISCONTINUED | OUTPATIENT
Start: 2018-04-24 | End: 2018-04-27 | Stop reason: HOSPADM

## 2018-04-24 RX ORDER — LEVOFLOXACIN 5 MG/ML
750 INJECTION, SOLUTION INTRAVENOUS EVERY 24 HOURS
Status: DISCONTINUED | OUTPATIENT
Start: 2018-04-24 | End: 2018-04-24

## 2018-04-24 RX ORDER — ONDANSETRON 2 MG/ML
4 INJECTION INTRAMUSCULAR; INTRAVENOUS EVERY 6 HOURS PRN
Status: DISCONTINUED | OUTPATIENT
Start: 2018-04-24 | End: 2018-04-27 | Stop reason: HOSPADM

## 2018-04-24 RX ORDER — ACYCLOVIR 800 MG/1
400 TABLET ORAL 2 TIMES DAILY
Status: DISCONTINUED | OUTPATIENT
Start: 2018-04-24 | End: 2018-04-24

## 2018-04-24 RX ORDER — ACETAMINOPHEN 325 MG/1
650 TABLET ORAL EVERY 6 HOURS PRN
Status: DISCONTINUED | OUTPATIENT
Start: 2018-04-24 | End: 2018-04-27 | Stop reason: HOSPADM

## 2018-04-24 RX ORDER — GABAPENTIN 300 MG/1
600 CAPSULE ORAL 2 TIMES DAILY
Status: DISCONTINUED | OUTPATIENT
Start: 2018-04-24 | End: 2018-04-27 | Stop reason: HOSPADM

## 2018-04-24 RX ORDER — ASPIRIN 81 MG/1
81 TABLET, CHEWABLE ORAL EVERY OTHER DAY
Status: DISCONTINUED | OUTPATIENT
Start: 2018-04-24 | End: 2018-04-27 | Stop reason: HOSPADM

## 2018-04-24 RX ORDER — PREDNISONE 1 MG/1
10 TABLET ORAL DAILY
Status: DISCONTINUED | OUTPATIENT
Start: 2018-04-24 | End: 2018-04-27 | Stop reason: HOSPADM

## 2018-04-24 RX ORDER — FOLIC ACID 1 MG/1
1 TABLET ORAL DAILY
Status: DISCONTINUED | OUTPATIENT
Start: 2018-04-24 | End: 2018-04-27 | Stop reason: HOSPADM

## 2018-04-24 RX ORDER — ALBUTEROL SULFATE 90 UG/1
2 AEROSOL, METERED RESPIRATORY (INHALATION) 4 TIMES DAILY PRN
Status: DISCONTINUED | OUTPATIENT
Start: 2018-04-24 | End: 2018-04-27 | Stop reason: HOSPADM

## 2018-04-24 RX ORDER — PANTOPRAZOLE SODIUM 40 MG/1
40 TABLET, DELAYED RELEASE ORAL
Status: DISCONTINUED | OUTPATIENT
Start: 2018-04-24 | End: 2018-04-27 | Stop reason: HOSPADM

## 2018-04-24 RX ORDER — MORPHINE SULFATE 2 MG/ML
2 INJECTION, SOLUTION INTRAMUSCULAR; INTRAVENOUS EVERY 4 HOURS PRN
Status: DISCONTINUED | OUTPATIENT
Start: 2018-04-24 | End: 2018-04-27 | Stop reason: HOSPADM

## 2018-04-24 RX ORDER — ACYCLOVIR 200 MG/1
400 CAPSULE ORAL EVERY 12 HOURS SCHEDULED
Status: DISCONTINUED | OUTPATIENT
Start: 2018-04-24 | End: 2018-04-27 | Stop reason: HOSPADM

## 2018-04-24 RX ORDER — FENOFIBRATE 48 MG/1
134 TABLET, COATED ORAL DAILY
Status: DISCONTINUED | OUTPATIENT
Start: 2018-04-24 | End: 2018-04-27 | Stop reason: HOSPADM

## 2018-04-24 RX ORDER — MAGNESIUM HYDROXIDE/ALUMINUM HYDROXICE/SIMETHICONE 120; 1200; 1200 MG/30ML; MG/30ML; MG/30ML
30 SUSPENSION ORAL EVERY 6 HOURS PRN
Status: DISCONTINUED | OUTPATIENT
Start: 2018-04-24 | End: 2018-04-27 | Stop reason: HOSPADM

## 2018-04-24 RX ADMIN — GABAPENTIN 600 MG: 300 CAPSULE ORAL at 08:44

## 2018-04-24 RX ADMIN — GABAPENTIN 600 MG: 300 CAPSULE ORAL at 17:03

## 2018-04-24 RX ADMIN — PREDNISONE 10 MG: 5 TABLET ORAL at 08:44

## 2018-04-24 RX ADMIN — PANTOPRAZOLE SODIUM 40 MG: 40 TABLET, DELAYED RELEASE ORAL at 05:17

## 2018-04-24 RX ADMIN — CITALOPRAM HYDROBROMIDE 40 MG: 20 TABLET ORAL at 08:43

## 2018-04-24 RX ADMIN — INSULIN LISPRO 1 UNITS: 100 INJECTION, SOLUTION INTRAVENOUS; SUBCUTANEOUS at 23:44

## 2018-04-24 RX ADMIN — ACYCLOVIR 400 MG: 200 CAPSULE ORAL at 20:14

## 2018-04-24 RX ADMIN — ACYCLOVIR 400 MG: 200 CAPSULE ORAL at 08:44

## 2018-04-24 RX ADMIN — FOLIC ACID 1 MG: 1 TABLET ORAL at 08:44

## 2018-04-24 RX ADMIN — ASPIRIN 81 MG 81 MG: 81 TABLET ORAL at 08:44

## 2018-04-24 RX ADMIN — CEFEPIME HYDROCHLORIDE 2000 MG: 2 INJECTION, SOLUTION INTRAVENOUS at 14:39

## 2018-04-24 RX ADMIN — INSULIN LISPRO 2 UNITS: 100 INJECTION, SOLUTION INTRAVENOUS; SUBCUTANEOUS at 17:04

## 2018-04-24 RX ADMIN — FENOFIBRATE 144 MG: 48 TABLET, FILM COATED ORAL at 08:44

## 2018-04-24 NOTE — CASE MANAGEMENT
Initial Clinical Review    Please note: Patient upgraded to [id number] 4/24 @ 1308 from [facility] 4/23 @     04/24/18 1308  Inpatient Admission Once     Transfer Service: General Medicine       Question Answer Comment   Admitting Physician Jane Denise    Level of Care Med Surg    Estimated length of stay More than 2 Midnights    Certification I certify that inpatient services are medically necessary for this patient for a duration of greater than two midnights  See H&P and MD Progress Notes for additional information about the patient's course of treatment  04/24/18 1307       ED: Date/Time/Mode of Arrival:   ED Arrival Information     Expected Arrival Acuity Means of Arrival Escorted By Service Admission Type    - 4/23/2018 19:09 Urgent Ambulance [facility] General Medicine Urgent    Arrival Complaint    Vomiting        Chief Complaint:   Chief Complaint   Patient presents with    Flank Pain     Pt reports left sided flank pain that started today after he visited the ED  Reports fever and vomiting at home  Took ibuprofen at prior to ED arrival        History of Illness:   71-year-old male with a history of pancytopenia, chronic lymphocytic leukemia on oral and IV chemotherapy presents to the emergency department with recurrence of fever and chills  Patient was seen here earlier for URI symptoms and fever up to 101  He had blood work and a chest x-ray which were not concerning for an acute infection  His oncologist was contacted and patient was given a dose of IV antibiotic and discharged home  When he arrived home he states he felt very weak and fatigued  Shortly after coming home he developed a fever again of 101 along with chills  He took Motrin and had multiple episodes of nonbloody, nonbilious vomiting  He also has developed left upper and left lower quadrant abdominal pain  He had a few episodes of nonbloody diarrhea  No urinary complaints      ED Vital Signs:   ED Triage Vitals Temperature Pulse Respirations Blood Pressure SpO2   04/23/18 1912 04/23/18 1912 04/23/18 1912 04/23/18 1912 04/23/18 1912   98 9 °F (37 2 °C) 91 (!) 24 155/85 98 %      Temp Source Heart Rate Source Patient Position - Orthostatic VS BP Location FiO2 (%)   04/23/18 1912 04/23/18 1912 04/23/18 2143 04/23/18 1912 --   Oral Monitor Lying Right arm       Pain Score       04/23/18 1912       6        Wt Readings from Last 1 Encounters:   04/23/18 102 kg (225 lb 6 4 oz)       Vital Signs (abnormal):   04/23/18 2223 99 9 °F (37 7 °C) --     Abnormal Labs/Diagnostic Test Results:  T pro 5 8,   Wbc's 2 44,   Plats 26,   Segs 77  Urine:  Ph 8 5,   Small blood  CT A&P: 1   Splenomegaly measuring 17 5 cm in craniocaudal dimension   No focal splenic lesion  2   Cholelithiasis without other evidence of acute cholecystitis  3   Small hiatal hernia with circumferential thickening of the distal esophagus   Correlate for evidence of reflux esophagitis and consider endoscopy if clinically warranted  4   Mild bowel wall thickening of a portion of the transverse colon may represent peristalsis versus focal colitis   Correlate clinically for evidence of colitis  ED Treatment:   Medication Administration from 04/23/2018 1909 to 04/23/2018 2349       Date/Time Order Dose Route Action Action by Comments     04/23/2018 2313 sodium chloride 0 9 % bolus 1,000 mL 0 mL Intravenous Stopped Bianca Ahumada RN      04/23/2018 2118 sodium chloride 0 9 % bolus 1,000 mL 1,000 mL Intravenous Ajay Glover RN      04/23/2018 2118 ondansetron [facility]) injection 4 mg 4 mg Intravenous Given Chayito Perdue RN      04/23/2018 2119 morphine injection 2 mg 2 mg Intravenous Given Chayito Perdue RN      04/23/2018 2207 iohexol (OMNIPAQUE) 350 MG/ML injection (MULTI-DOSE) 100 mL 100 mL Intravenous Given [employer]           Past Medical/Surgical History:    Active Ambulatory Problems     Diagnosis Date Noted    CAD (coronary artery disease) 01/01/2004    CLL [facility]ocytic leukemia) [facility] [address] [facility]     Hyperlipidemia     Hypertension     History of TIA (transient ischemic attack)     Gastroesophageal reflux disease with esophagitis 01/18/2017    Candida esophagitis ([facility] [facility] ) 01/18/2017    CKD (chronic kidney disease) stage 3, GFR 30-59 ml/min     H/O blood clots     Hemolytic anemia (HCC) 03/06/2017    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[facility]) 03/06/2017    Anemia, chronic disease 03/10/2017    Chronic lymphocytic leukemia ([facility] [address] [facility] ) 03/20/2017    Leukopenia due to antineoplastic chemotherapy ([facility] [address] ) 09/18/2017    Hyperglycemia 09/18/2017    Fever 10/08/2017    Cellulitis of head or scalp 10/08/2017    Pancytopenia ([facility] [address] [facility] ) 10/28/2017    Headache around the eyes 10/30/2017    Gastroesophageal reflux disease without esophagitis 01/31/2018     Resolved Ambulatory Problems     Diagnosis Date Noted    GI bleed 03/10/2017     Past Medical History:   Diagnosis Date    CAD (coronary artery disease) 2004    Cellulitis of scalp     CKD (chronic kidney disease) stage 3, GFR 30-59 ml/min     CLL (chronic lymphocytic leukemia) ([facility] [address] [facility]     COPD (chronic obstructive pulmonary disease) ([facility] [address] [facility]     Diabetes mellitus ([facility] [address] [facility]     H/O blood clots     Hemolytic anemia (HCC)     History of TIA (transient ischemic attack)     Hyperlipidemia     Hypertension     Multiple gastric ulcers     Myocardial infarction ([facility] [address] [facility]     Recurrent sinusitis     Thrombocytopenia (HCC)     Vertigo        Admitting Diagnosis: Abdominal pain [R10 9]  Flank pain [R10 9]  Fever [R50 9]    Age/Sex: 60 y o  male    Assessment/Plan:   Principal Problem:    Fever  Active Problems:    CLL (chronic lymphocytic leukemia) (HCC)    Hyperlipidemia    Hypertension    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)    Pancytopenia (HCC)    Gastroesophageal reflux disease without esophagitis    Abdominal pain    DM    Plan for the Primary Problem(s):  · Fever in immunocompromised patient on chemo  ? Admit to med/surg  Initial workup negative  Chest xray negative  Urine negative  Abdominal CT with possible focal colitis  Await blood cultures  Started on levaquin in ED, will continue for now      Plan for Additional Problems:   · CLL- continue chemotherapeutic medications  · Hyperlipidemia- on tricor  · HTN- not on meds at home  Monitor BP   · HIT- platelets 79B  Normally runs between low 30's and 50  Consult hematology  Last platelet transfusion was more than 1 year ago  · GERD- continue protonix  · Abdominal pain with vomiting and diarrhea- possible colitis on CT  Symptoms improved at present  Will monitor closely  · DM- mostly diet controlled  Uses insulin only before and after chemo     VTE Prophylaxis: Pharmacologic VTE Prophylaxis contraindicated due to HIT  / sequential compression device   Code Status: full code  POLST: There is no POLST form on file for this patient (pre-hospital)  Anticipated Length of Stay:  Patient will be admitted on an Observation basis with an anticipated length of stay of  Less than 2 midnights     Justification for Hospital Stay: patient requires close monitoring of blood work and cultures       Admission Orders:  OBS  Neutropenic precautions  CBC,  BMP  SCD's  Consult Hematology  Consult ID    Scheduled Meds:   Current Facility-Administered Medications:  acetaminophen 650 mg Oral Q6H PRN Deonte Tate PA-C   acyclovir 400 mg Oral Q12H [address] Deonte Tate PA-C   albuterol 2 puff Inhalation 4x Daily PRN Deonte Tate PA-C   aluminum-magnesium hydroxide-simethicone 30 mL Oral Q6H PRN Deonte Tate PA-C   aspirin 81 mg Oral Every Other Day Deonte Tate PA-C   citalopram 40 mg Oral Daily Deonte Tate PA-C   fenofibrate 144 mg Oral Daily Deonte Tate PA-C   folic acid 1 mg Oral Daily Deonte Tate PA-C   gabapentin 600 mg Oral BID Deonte Tate PA-C   Ibrutinib 140 mg Oral Daily Deonte Tate PA-C   insulin lispro 1-5 Units Subcutaneous HS Deonte Tate PA-C   insulin lispro 1-6 Units Subcutaneous TID AC Deonte Tate PA-C   levofloxacin 750 mg Intravenous Q24H Deonte Tate PA-C   morphine injection 2 mg Intravenous Q4H PRN Deonte Tate PA-C   ondansetron 4 mg Intravenous Q6H PRN Labsajan Tate PA-C   pantoprazole 40 mg Oral Early Morning Deonte Tate PA-C   predniSONE 10 mg Oral Daily Deonte Tate PA-C     Continuous Infusions:    PRN Meds:   acetaminophen    albuterol    aluminum-magnesium hydroxide-simethicone    morphine injection    Ondansetron  4/24:  95 8 - 85 - 20   118/62    RA 97%  BC with Gram + rods    Thank you,  [address] [place] [address] in the [facility] by Maximilian Tavares for 2017  Network Utilization Review Department  Phone: [phone]; Fax [phone]  ATTENTION: The Network Utilization Review Department is now centralized for our 7 Facilities  Make a note that we have a new phone and fax numbers for our Department  Please call with any questions or concerns to [phone] and carefully follow the prompts so that you are directed to the right person  All voicemails are confidential  Fax any determinations, approvals, denials, and requests for initial or continue stay review clinical to [phone]  Due to HIGH CALL volume, it would be easier if you could please send faxed requests to expedite your requests and in part, help us provide discharge notifications faster

## 2018-04-24 NOTE — TELEPHONE ENCOUNTER
[facility] calling to inform us that they have not been able to reach the patient to set up delivery  They are not cancelling, but they are putting the delivery on hold until they can reach him

## 2018-04-24 NOTE — PLAN OF CARE
DISCHARGE PLANNING     Discharge to home or other facility with appropriate resources Progressing        HEMATOLOGIC - ADULT     Maintains hematologic stability Progressing        INFECTION - ADULT     Absence or prevention of progression during hospitalization Progressing     Absence of fever/infection during neutropenic period Progressing        Knowledge Deficit     Patient/family/caregiver demonstrates understanding of disease process, treatment plan, medications, and discharge instructions Progressing        PAIN - ADULT     Verbalizes/displays adequate comfort level or baseline comfort level Progressing        Potential for Falls     Patient will remain free of falls Progressing        SAFETY ADULT     Maintain or return to baseline ADL function Progressing     Maintain or return mobility status to optimal level Progressing

## 2018-04-24 NOTE — ED PROVIDER NOTES
History  Chief Complaint   Patient presents with    Flank Pain     Pt reports left sided flank pain that started today after he visited the ED  Reports fever and vomiting at home  Took ibuprofen at prior to ED arrival      21-year-old male with a history of pancytopenia, chronic lymphocytic leukemia on oral and IV chemotherapy presents to the emergency department with recurrence of fever and chills  Patient was seen here earlier for URI symptoms and fever up to 101  He had blood work and a chest x-ray which were not concerning for an acute infection  His oncologist was contacted and patient was given a dose of IV antibiotic and discharged home  When he arrived home he states he felt very weak and fatigued  Shortly after coming home he developed a fever again of 101 along with chills  He took Motrin and had multiple episodes of nonbloody, nonbilious vomiting  He also has developed left upper and left lower quadrant abdominal pain  He had a few episodes of nonbloody diarrhea  No urinary complaints          History provided by:  Patient   used: No    Flank Pain   Pain location:  LUQ and LLQ  Pain quality: cramping    Pain radiates to:  Does not radiate  Pain severity:  Unable to specify  Onset quality:  Gradual  Duration:  4 hours  Timing:  Constant  Progression:  Unchanged  Chronicity:  New  Context: not awakening from sleep, not diet changes, not eating, not previous surgeries, not recent illness, not recent travel, not sick contacts, not suspicious food intake and not trauma    Relieved by:  Nothing  Worsened by:  Nothing  Ineffective treatments:  NSAIDs  Associated symptoms: chills, cough, diarrhea, fatigue, fever, nausea and vomiting    Associated symptoms: no anorexia, no belching, no chest pain, no constipation, no dysuria, no flatus, no hematemesis, no hematochezia, no hematuria, no melena, no shortness of breath and no sore throat    Cough:     Cough characteristics:  Dry    Sputum characteristics: None  Onset quality:  Gradual    Duration:  1 day    Timing:  Intermittent    Progression:  Unchanged    Chronicity:  New  Fever:     Duration:  1 day    Timing:  Intermittent    Max temp PTA:  101    Temp source:  Oral    Progression:  Waxing and waning  Vomiting:     Quality:  Stomach contents    Number of occurrences:  7    Timing:  Intermittent  Risk factors: obesity    Risk factors: no alcohol abuse, has not had multiple surgeries and no NSAID use        Prior to Admission Medications   Prescriptions Last Dose Informant Patient Reported? Taking?    Ibrutinib 140 MG CAPS  Self No No   Sig: Take 1 capsule by mouth daily   Lancets (FREESTYLE) lancets  Self No No   Sig: Use as instructed--test 2 times a day    E 11 9   VENTOLIN  (90 Base) MCG/ACT inhaler  Self Yes No   Sig: INHALE 2 PUFFS 4 TIMES A DAY AS NEEDED   acetaminophen (TYLENOL) 325 mg tablet  Self No No   Sig: Take 2 tablets by mouth every 6 (six) hours as needed for fever (fever > 101 4)   acyclovir (ZOVIRAX) 400 MG tablet  Self Yes No   Sig: Take 400 mg by mouth 2 (two) times a day   aspirin 81 MG tablet  Self Yes No   Sig: Take 81 mg by mouth every other day Every other day    citalopram (CeleXA) 40 mg tablet  Self Yes No   Sig: Take 40 mg by mouth daily   fenofibrate (TRIGLIDE) 50 MG tablet  Self Yes No   Sig: Take 134 mg by mouth daily     folic acid (FOLVITE) 1 mg tablet  Self Yes No   Sig: Take 1 mg by mouth daily   gabapentin (NEURONTIN) 300 mg capsule  Self No No   Sig: Take 2 capsules by mouth 3 (three) times a day   Patient taking differently: Take 600 mg by mouth 2 (two) times a day     glucose monitoring kit (FREESTYLE) monitoring kit  Self No No   Si each by Does not apply route as needed ( ) E 11 9   insulin glargine (LANTUS) 100 units/mL subcutaneous injection  Self No No   Sig: Inject 15 Units under the skin every morning   Patient taking differently: Inject 12 Units under the skin every morning     insulin lispro (HumaLOG) 100 units/mL injection  Self No No   Sig: Inject 10 Units under the skin 3 (three) times a day with meals   levofloxacin (LEVAQUIN) 750 mg tablet   No No   Sig: Take 1 tablet (750 mg total) by mouth daily for 4 days   multivitamin (THERAGRAN) TABS  Self Yes No   Sig: Take 1 tablet by mouth daily   obinutuzumab (GAZYVA) 1000 mg/40 mL SOLN  Self Yes No   Sig: Infuse into a venous catheter   pantoprazole (PROTONIX) 40 mg tablet  Self Yes No   Sig: Take 40 mg by mouth daily     predniSONE 20 mg tablet  Self No No   Sig: Take 1 tablet by mouth daily   Patient taking differently: Take 10 mg by mouth daily        Facility-Administered Medications: None       Past Medical History:   Diagnosis Date    CAD (coronary artery disease) 2004    Cellulitis of scalp     CKD (chronic kidney disease) stage 3, GFR 30-59 ml/min     CLL (chronic lymphocytic leukemia) (HCC)     COPD (chronic obstructive pulmonary disease) (HCC)     Diabetes mellitus ([facility] [address] )     H/O blood clots     Hemolytic anemia (HCC)     History of TIA (transient ischemic attack)     Hyperlipidemia     Hypertension     Multiple gastric ulcers     Myocardial infarction (HCC)     Recurrent sinusitis     Thrombocytopenia (HCC)     Vertigo        Past Surgical History:   Procedure Laterality Date    ARTHROSCOPIC REPAIR ACL      lt knee    CARDIAC SURGERY      cardiac cath with stent    KNEE ARTHROSCOPY      PORTACATH PLACEMENT      CT ESOPHAGOGASTRODUODENOSCOPY TRANSORAL DIAGNOSTIC N/A 10/5/2017    Procedure: ESOPHAGOGASTRODUODENOSCOPY (EGD) with bx;  Surgeon: Klever Meek DO;  Location: [facility]; Service: Gastroenterology    CT ESOPHAGOGASTRODUODENOSCOPY TRANSORAL DIAGNOSTIC N/A 3/8/2018    Procedure: ESOPHAGOGASTRODUODENOSCOPY (EGD) with biopsy;  Surgeon: Klever Meek DO;  Location: [facility];   Service: Gastroenterology    CT ESOPHAGOSCOPY FLEXIBLE TRANSORAL WITH BIOPSY N/A 9/2/2016    Procedure: ESOPHAGOGASTRODUODENOSCOPY (EGD); ESOPHAGEAL DILATION; ESOPHAGEAL BIOPSY;  Surgeon: Zeeshan Estrada MD;  Location: [facility];  Service: Thoracic    TONSILLECTOMY      UPPER GASTROINTESTINAL ENDOSCOPY      x 6       History reviewed  No pertinent family history  I have reviewed and agree with the history as documented  Social History   Substance Use Topics    Smoking status: Former Smoker    Smokeless tobacco: Never Used      Comment: pt refuses to answer question    Alcohol use No        Review of Systems   Constitutional: Positive for activity change, appetite change, chills, fatigue and fever  HENT: Negative  Negative for sore throat  Eyes: Negative  Respiratory: Positive for cough  Negative for shortness of breath  Cardiovascular: Negative  Negative for chest pain  Gastrointestinal: Positive for abdominal pain, diarrhea, nausea and vomiting  Negative for anorexia, constipation, flatus, hematemesis, hematochezia and melena  Genitourinary: Negative for dysuria, flank pain and hematuria  Musculoskeletal: Negative for neck pain  Skin: Negative  Allergic/Immunologic: Negative  Neurological: Negative  Negative for weakness, numbness and headaches  Hematological: Negative  Psychiatric/Behavioral: Negative  All other systems reviewed and are negative        Physical Exam  ED Triage Vitals   Temperature Pulse Respirations Blood Pressure SpO2   04/23/18 1912 04/23/18 1912 04/23/18 1912 04/23/18 1912 04/23/18 1912   98 9 °F (37 2 °C) 91 (!) 24 155/85 98 %      Temp Source Heart Rate Source Patient Position - Orthostatic VS BP Location FiO2 (%)   04/23/18 1912 04/23/18 1912 04/23/18 2143 04/23/18 1912 --   Oral Monitor Lying Right arm       Pain Score       04/23/18 1912       6           Orthostatic Vital Signs  Vitals:    04/23/18 1912 04/23/18 2045 04/23/18 2143 04/23/18 2245   BP: 155/85 151/82 152/72 146/79   Pulse: 91 103 93 95   Patient Position - Orthostatic VS:   Lying Lying       Physical Exam Constitutional: He is oriented to person, place, and time  He appears well-developed and well-nourished  Non-toxic appearance  He does not have a sickly appearance  He does not appear ill  No distress  HENT:   Head: Normocephalic and atraumatic  Right Ear: Tympanic membrane and external ear normal    Left Ear: Tympanic membrane and external ear normal    Mouth/Throat: Oropharynx is clear and moist  No oropharyngeal exudate  Eyes: Conjunctivae are normal  Pupils are equal, round, and reactive to light  No scleral icterus  Neck: Normal range of motion  Neck supple  Cardiovascular: Regular rhythm and normal heart sounds  Tachycardia present  Pulmonary/Chest: Effort normal and breath sounds normal    Abdominal: Soft  Normal appearance and bowel sounds are normal  He exhibits no distension and no mass  There is tenderness in the left upper quadrant and left lower quadrant  There is no rebound and no guarding  No hernia  Musculoskeletal: Normal range of motion  He exhibits no edema, tenderness or deformity  Lymphadenopathy:     He has no cervical adenopathy  Neurological: He is alert and oriented to person, place, and time  He has normal strength and normal reflexes  He exhibits normal muscle tone  Skin: Skin is warm and dry  No rash noted  He is not diaphoretic  No erythema  No pallor  Psychiatric: He has a normal mood and affect  Nursing note and vitals reviewed        ED Medications  Medications   sodium chloride 0 9 % bolus 1,000 mL (0 mL Intravenous Stopped 4/23/18 2313)   ondansetron (ZOFRAN) injection 4 mg (4 mg Intravenous Given 4/23/18 2118)   morphine injection 2 mg (2 mg Intravenous Given 4/23/18 2119)   iohexol (OMNIPAQUE) 350 MG/ML injection (MULTI-DOSE) 100 mL (100 mL Intravenous Given 4/23/18 2207)       Diagnostic Studies  Results Reviewed     Procedure Component Value Units Date/Time    Influenza A/B and RSV by PCR (indicated for patients >2 mo of age) [[id number]]     Lab Status:  No result Specimen:  Nasopharyngeal from Nasopharyngeal Swab     Urine Microscopic [[id number]] Collected:  04/23/18 2233    Lab Status: In process Specimen:  Urine from Urine, Clean Catch Updated:  04/23/18 2244    POCT urinalysis dipstick [[id number]]  (Abnormal) Resulted:  04/23/18 2236    Lab Status:  Final result Specimen:  Urine Updated:  04/23/18 2236    ED Urine Macroscopic [[id number]]  (Abnormal) Collected:  04/23/18 2233    Lab Status:  Final result Specimen:  Urine Updated:  04/23/18 2232     Color, UA Yellow     Clarity, UA Clear     pH, UA 8 5 (H)     Leukocytes, UA Negative     Nitrite, UA Negative     Protein, UA Negative mg/dl      Glucose, UA Negative mg/dl      Ketones, UA Negative mg/dl      Urobilinogen, UA 1 0 E U /dl      Bilirubin, UA Negative     Blood, UA Small (A)     Specific [place], UA 1 015    Narrative:       CLINITEK RESULT    Comprehensive metabolic panel [[id number]]  (Abnormal) Collected:  04/23/18 2118    Lab Status:  Final result Specimen:  Blood from Arm, Left Updated:  04/23/18 2203     Sodium 139 mmol/L      Potassium 3 8 mmol/L      Chloride 103 mmol/L      CO2 26 mmol/L      Anion Gap 10 mmol/L      BUN 18 mg/dL      Creatinine 1 18 mg/dL      Glucose 126 mg/dL      Calcium 8 4 mg/dL      AST 26 U/L      ALT 42 U/L      Alkaline Phosphatase 44 (L) U/L      Total Protein 5 8 (L) g/dL      Albumin 4 0 g/dL      Total Bilirubin 0 91 mg/dL      eGFR 65 ml/min/1 73sq m     Narrative:         National Kidney Disease Education Program recommendations are as follows:  GFR calculation is accurate only with a steady state creatinine  Chronic Kidney disease less than 60 ml/min/1 73 sq  meters  Kidney failure less than 15 ml/min/1 73 sq  meters      Lipase [[id number]]  (Normal) Collected:  04/23/18 2118    Lab Status:  Final result Specimen:  Blood from Arm, Left Updated:  04/23/18 2203     Lipase 73 u/L     Lactic acid, plasma [[id number]]  (Normal) Collected:  04/23/18 2118    Lab Status:  Final result Specimen:  Blood from Arm, Left Updated:  04/23/18 2156     LACTIC ACID 0 7 mmol/L     Narrative:         Result may be elevated if tourniquet was used during collection  CBC and differential [[id number]]  (Abnormal) Collected:  04/23/18 2118    Lab Status:  Final result Specimen:  Blood from Arm, Left Updated:  04/23/18 2134     WBC 2 44 (L) Thousand/uL      RBC 4 39 Million/uL      Hemoglobin 13 3 g/dL      Hematocrit 38 6 %      MCV 88 fL      MCH 30 3 pg      MCHC 34 5 g/dL      RDW 15 0 %      MPV 11 7 fL      Platelets 26 (LL) Thousands/uL      nRBC 0 /100 WBCs      Neutrophils Relative 81 (H) %      Lymphocytes Relative 12 (L) %      Monocytes Relative 7 %      Eosinophils Relative 0 %      Basophils Relative 0 %      Neutrophils Absolute 1 97 Thousands/µL      Lymphocytes Absolute 0 29 (L) Thousands/µL      Monocytes Absolute 0 18 Thousand/µL      Eosinophils Absolute 0 00 Thousand/µL      Basophils Absolute 0 00 Thousands/µL                  CT abdomen pelvis with contrast   Final Result by Dheeraj Cortes MD (04/23 2247)   1   Splenomegaly measuring 17 5 cm in craniocaudal dimension  No focal splenic lesion  2   Cholelithiasis without other evidence of acute cholecystitis  3   Small hiatal hernia with circumferential thickening of the distal esophagus  Correlate for evidence of reflux esophagitis and consider endoscopy if clinically warranted  4   Mild bowel wall thickening of a portion of the transverse colon may represent peristalsis versus focal colitis  Correlate clinically for evidence of colitis  Workstation performed: [id number]                    Procedures  Procedures       Phone Contacts  ED Phone Contact    ED Course  ED Course                                MDM  Number of Diagnoses or Management Options  Diagnosis management comments: 25-year-old male re-presents to the emergency department now with recurrence of fever up to 101 along with chills    His new complaints include vomiting and abdominal pain  He was seen here earlier with URI complaints and was worked up and sent home after a dose of Levaquin here  On exam he does not appear acutely ill but is slightly tachycardic on exam   He does have tenderness to the left upper and left lower quadrants without rebound or guarding  Will recheck CBC, lactic acid  Will CT abdomen pelvis to rule out acute abdominal pathology  Will contact his oncologist as he does receive both oral and IV chemotherapy  His last chemotherapy dose was about a week ago  Amount and/or Complexity of Data Reviewed  Clinical lab tests: ordered and reviewed  Tests in the radiology section of CPT®: ordered and reviewed  Review and summarize past medical records: yes  Discuss the patient with other providers: yes  Independent visualization of images, tracings, or specimens: yes      CritCare Time    Disposition  Final diagnoses:   Fever   Abdominal pain     Time reflects when diagnosis was documented in both MDM as applicable and the Disposition within this note     Time User Action Codes Description Comment    4/23/2018 11:13 PM Juanjose Mandel Add [R50 9] Fever     4/23/2018 11:13 PM Juanjose Bowling [R10 9] Abdominal pain       ED Disposition     ED Disposition Condition Comment    Admit  Case was discussed with carol and the patient's admission status was agreed to be Admission Status: observation status to the service of Dr Renetta Glynn   Follow-up Information    None       Patient's Medications   Discharge Prescriptions    No medications on file     No discharge procedures on file      ED Provider  Electronically Signed by           Jo Ann Fenton DO  04/23/18 6109

## 2018-04-24 NOTE — CONSULTS
Oncology Consult Note  Mauro Gilbert 60 y o  male MRN: [id number]  Unit/Bed#: [facility] [facility] 2 -02 Encounter: [id number]      Presenting Complaint:  Chronic lymphocytic leukemia  History of Presenting Illness:  A 58-year-old gentleman who has long history of chronic lymphocytic leukemia  CLL was diagnosed, when he was 38years old  He has been under the care of Dr Mcnamara Borne  In the last 7 months, he has been on ibrutinib with good response  He was hospitalized with 1 days of fever  He was not necessarily neutropenic  However, he has mild leukopenia and thrombocytopenia  He is not anemic  Blood culture showed Gram-positive cristhian  He is on IV antibiotics  Infectious Disease was consulted  He has no respiratory symptoms  His temperature is down since admission  He has no complaint of pain  He has very stable weight  Review of Systems - As stated in the HPI otherwise the fourteen point review of systems was negative      Past Medical History:   Diagnosis Date    CAD (coronary artery disease) 2004    Cellulitis of scalp     CKD (chronic kidney disease) stage 3, GFR 30-59 ml/min     [facility] [address] [facility]     COPD [facility]chronic obstructive pulmonary disease) [facility] [address] [facility]     Diabetes mellitus ([address] )     H/O blood clots     Hemolytic anemia (HCC)     History of TIA (transient ischemic attack)     Hyperlipidemia     Hypertension     Multiple gastric ulcers     Myocardial infarction ([facility] [address] [facility]     Recurrent sinusitis     Thrombocytopenia (HCC)     Vertigo        Social History     Social History    Marital status: /Civil Union     Spouse name: N/A    Number of children: N/A    Years of education: N/A     Social History Main Topics    Smoking status: Former Smoker    Smokeless tobacco: Never Used      Comment: pt refuses to answer question    Alcohol use No    Drug use: No    Sexual activity: Not Asked     Other Topics Concern    None     Social History Narrative    None History reviewed  No pertinent family history      Allergies   Allergen Reactions    Heparin Other (See Comments)     Other reaction(s): Blood Disorders  clot    Macrolides And Ketolides Other (See Comments)     Interacts with other meds he is taking         Current Facility-Administered Medications:     acetaminophen (TYLENOL) tablet 650 mg, 650 mg, Oral, Q6H PRN, Robert Morrissey PA-C    acyclovir (ZOVIRAX) capsule 400 mg, 400 mg, Oral, Q12H [address], Robert Morrissey PA-C, 400 mg at 04/24/18 0844    albuterol (PROVENTIL HFA,VENTOLIN HFA) inhaler 2 puff, 2 puff, Inhalation, 4x Daily PRN, Robert Morrissey PA-C    aluminum-magnesium hydroxide-simethicone (MYLANTA) 200-200-20 mg/5 mL oral suspension 30 mL, 30 mL, Oral, Q6H PRN, Robert Morrissey PA-C    aspirin chewable tablet 81 mg, 81 mg, Oral, Every Other Day, Robert Morrissey PA-C, 81 mg at 04/24/18 0844    cefepime (MAXIPIME) IVPB (premix) 2,000 mg, 2,000 mg, Intravenous, Q12H, Aurelia Kessler DO, Last Rate: 100 mL/hr at 04/24/18 1439, 2,000 mg at 04/24/18 1439    citalopram (CeleXA) tablet 40 mg, 40 mg, Oral, Daily, Robert Morrissey PA-C, 40 mg at 04/24/18 0843    fenofibrate (TRICOR) tablet 144 mg, 144 mg, Oral, Daily, Robert Morrissey PA-C, 144 mg at 89/94/77 5073    folic acid (FOLVITE) tablet 1 mg, 1 mg, Oral, Daily, Robert Morrissey PA-C, 1 mg at 04/24/18 0844    gabapentin (NEURONTIN) capsule 600 mg, 600 mg, Oral, BID, Robert Morrissey PA-C, 600 mg at 04/24/18 0844    Ibrutinib CAPS 1 capsule, 140 mg, Oral, Daily, Robert Morrissey PA-C, Stopped at 04/24/18 0900    insulin lispro (HumaLOG) 100 units/mL subcutaneous injection 1-5 Units, 1-5 Units, Subcutaneous, HS, Robetr Morrissey PA-C    insulin lispro (HumaLOG) 100 units/mL subcutaneous injection 1-6 Units, 1-6 Units, Subcutaneous, TID AC **AND** Fingerstick Glucose (POCT), , , TID AC, Robert Morrissey PA-C    morphine injection 2 mg, 2 mg, Intravenous, Q4H PRN, Robert Morrissey PA-C   ondansetron (ZOFRAN) injection 4 mg, 4 mg, Intravenous, Q6H PRN, Boogie Chol, PA-C    pantoprazole (PROTONIX) EC tablet 40 mg, 40 mg, Oral, Early Morning, Boogie Chol, PA-C, 40 mg at 04/24/18 0697    predniSONE tablet 10 mg, 10 mg, Oral, Daily, Boogie Chol, PA-C, 10 mg at 04/24/18 0844      /80 (BP Location: Right arm)   Pulse 90   Temp 98 2 °F (36 8 °C)   Resp 20   SpO2 97%     General Appearance:    Alert, oriented        Eyes:    PERRL   Ears:    Normal external ear canals, both ears   Nose:   Nares normal, septum midline   Throat:   Mucosa moist  Pharynx without injection  Neck:   Supple       Lungs:     Clear to auscultation bilaterally   Chest Wall:    No tenderness or deformity    Heart:    Regular rate and rhythm       Abdomen:     Soft, non-tender, bowel sounds +, spleen is palpable 3-4 cm below left costal margin  Extremities:   Extremities no cyanosis or edema       Skin:   no rash or icterus      Lymph nodes:   Cervical, supraclavicular, and axillary nodes normal   Neurologic:   CNII-XII intact, normal strength, sensation and reflexes     Throughout               Recent Results (from the past 48 hour(s))   Bilirubin, direct    Collection Time: 04/23/18  8:32 AM   Result Value Ref Range    Bilirubin, Direct 0 24 (H) 0 00 - 0 20 mg/dL   CBC and differential    Collection Time: 04/23/18  8:32 AM   Result Value Ref Range    WBC 4 10 (L) 4 31 - 10 16 Thousand/uL    Adjusted WBC 4 10 (L) 4 31 - 10 16 Thousand/uL    RBC 4 68 3 88 - 5 62 Million/uL    Hemoglobin 13 6 12 0 - 17 0 g/dL    Hematocrit 41 9 36 5 - 49 3 %    MCV 89 82 - 98 fL    MCH 28 9 26 8 - 34 3 pg    MCHC 32 4 31 4 - 37 4 g/dL    RDW 15 1 11 6 - 15 1 %    MPV 8 6 (L) 8 9 - 12 7 fL    Platelets 25 (LL) 219 - 390 Thousands/uL   Comprehensive metabolic panel    Collection Time: 04/23/18  8:32 AM   Result Value Ref Range    Sodium 143 136 - 145 mmol/L    Potassium 3 9 3 5 - 5 3 mmol/L    Chloride 107 98 - 108 mmol/L    CO2 26 21 - 32 mmol/L    Anion Gap 10 4 - 13 mmol/L    BUN 17 5 - 25 mg/dL    Creatinine 1 30 0 60 - 1 30 mg/dL    Glucose 132 65 - 140 mg/dL    Calcium 8 7 8 3 - 10 1 mg/dL    AST 29 5 - 45 U/L    ALT 32 12 - 78 U/L    Alkaline Phosphatase 52 46 - 116 U/L    Total Protein 5 8 (L) 6 4 - 8 2 g/dL    Albumin 4 1 3 5 - 5 7 g/dL    Total Bilirubin 1 20 (H) 0 20 - 1 00 mg/dL    eGFR 58 ml/min/1 73sq m   IgG    Collection Time: 04/23/18  8:32 AM   Result Value Ref Range    IGG 78 0 (L) 700 0 - 1,600 0 mg/dL   LD,Blood    Collection Time: 04/23/18  8:32 AM   Result Value Ref Range     (H) 81 - 234 U/L   Reticulocytes    Collection Time: 04/23/18  8:32 AM   Result Value Ref Range    Retic Ct Abs 91,700 14,356 - 105,094    Retic Ct Pct 2 01 (H) 0 37 - 1 87 %   Uric acid    Collection Time: 04/23/18  8:32 AM   Result Value Ref Range    Uric Acid 4 9 4 2 - 8 0 mg/dL   Manual Differential (Non Wam)    Collection Time: 04/23/18  8:32 AM   Result Value Ref Range    Segmented % 76 (H) 43 - 75 %    Bands % 8 0 - 8 %    Lymphocytes % 10 (L) 14 - 44 %    Monocytes % 6 4 - 12 %    Eosinophils % 0 0 - 6 %    Basophils % 0 0 - 1 %    Neutrophils Absolute 3 44 1 81 - 6 82 Thousand/uL    Lymphocytes Absolute 0 41 (L) 0 60 - 4 47 Thousand/uL    Monocytes Absolute 0 25 0 00 - 1 22 Thousand/uL    Eosinophils Absolute 0 00 0 00 - 0 61 Thousand/uL    Basophils Absolute 0 00 0 00 - 0 10 Thousand/uL    Total Counted 100     Anisocytosis Present     Platelet Estimate Decreased (A) Adequate   CBC and differential    Collection Time: 04/23/18 11:32 AM   Result Value Ref Range    WBC 2 99 (L) 4 31 - 10 16 Thousand/uL    RBC 4 68 3 88 - 5 62 Million/uL    Hemoglobin 14 0 12 0 - 17 0 g/dL    Hematocrit 41 4 36 5 - 49 3 %    MCV 89 82 - 98 fL    MCH 29 9 26 8 - 34 3 pg    MCHC 33 8 31 4 - 37 4 g/dL    RDW 15 3 (H) 11 6 - 15 1 %    MPV 12 3 8 9 - 12 7 fL    Platelets 30 (LL) 832 - 390 Thousands/uL    nRBC 0 /100 WBCs   Comprehensive metabolic panel    Collection Time: 04/23/18 11:32 AM   Result Value Ref Range    Sodium 139 136 - 145 mmol/L    Potassium 3 8 3 5 - 5 3 mmol/L    Chloride 103 100 - 108 mmol/L    CO2 29 21 - 32 mmol/L    Anion Gap 7 4 - 13 mmol/L    BUN 19 5 - 25 mg/dL    Creatinine 1 29 0 60 - 1 30 mg/dL    Glucose 124 65 - 140 mg/dL    Calcium 8 7 8 3 - 10 1 mg/dL    AST 22 5 - 45 U/L    ALT 39 12 - 78 U/L    Alkaline Phosphatase 52 46 - 116 U/L    Total Protein 6 5 6 4 - 8 2 g/dL    Albumin 4 3 3 5 - 5 0 g/dL    Total Bilirubin 1 20 (H) 0 20 - 1 00 mg/dL    eGFR 59 ml/min/1 73sq m   Troponin I    Collection Time: 04/23/18 11:32 AM   Result Value Ref Range    Troponin I <0 02 <=0 04 ng/mL   Blood culture #2    Collection Time: 04/23/18 11:32 AM   Result Value Ref Range    Blood Culture      Gram Stain Result Gram positive rods    Lactic acid, plasma    Collection Time: 04/23/18 11:32 AM   Result Value Ref Range    LACTIC ACID 1 1 0 5 - 2 0 mmol/L   Manual Differential(PHLEBS Do Not Order)    Collection Time: 04/23/18 11:32 AM   Result Value Ref Range    Segmented % 77 (H) 43 - 75 %    Bands % 3 0 - 8 %    Lymphocytes % 10 (L) 14 - 44 %    Monocytes % 10 4 - 12 %    Eosinophils % 0 0 - 6 %    Basophils % 0 0 - 1 %    Absolute Neutrophils 2 39 1 85 - 7 62 Thousand/uL    Lymphocytes Absolute 0 30 (L) 0 60 - 4 47 Thousand/uL    Monocytes Absolute 0 30 0 00 - 1 22 Thousand/uL    Eosinophils Absolute 0 00 0 00 - 0 40 Thousand/uL    Basophils Absolute 0 00 0 00 - 0 10 Thousand/uL    Total Counted 100     Ovalocytes Present     Platelet Estimate Decreased (A) Adequate    Large Platelet Present    ECG 12 lead    Collection Time: 04/23/18 11:55 AM   Result Value Ref Range    Ventricular Rate 89 BPM    Atrial Rate 89 BPM    MA Interval 144 ms    QRSD Interval 82 ms    QT Interval 336 ms    QTC Interval 408 ms    P Axis 26 degrees    QRS Axis -62 degrees    T Wave Axis 6 degrees   UA w Reflex to Microscopic w Reflex to Culture    Collection Time: 04/23/18 12:20 PM   Result Value Ref Range    Color, UA Yellow     Clarity, UA Clear     Specific Gravity, UA 1 020 1 003 - 1 030    pH, UA 6 5 4 5 - 8 0    Leukocytes, UA Negative Negative    Nitrite, UA Negative Negative    Protein, UA Negative Negative mg/dl    Glucose, UA Negative Negative mg/dl    Ketones, UA Negative Negative mg/dl    Urobilinogen, UA 1 0 0 2, 1 0 E U /dl E U /dl    Bilirubin, UA Negative Negative    Blood, UA Small (A) >=2000 (4+), Trace-Intact, Negative   Urine Microscopic    Collection Time: 04/23/18 12:20 PM   Result Value Ref Range    RBC, UA 4-10 (A) None Seen, 0-5 /hpf    WBC, UA 0-1 (A) None Seen, 0-5, 5-55, 5-65 /hpf    Epithelial Cells Occasional None Seen, Occasional /hpf    Bacteria, UA None Seen None Seen, Occasional /hpf   Fingerstick Glucose (POCT)    Collection Time: 04/23/18  9:13 PM   Result Value Ref Range    POC Glucose 119 65 - 140 mg/dl   CBC and differential    Collection Time: 04/23/18  9:18 PM   Result Value Ref Range    WBC 2 44 (L) 4 31 - 10 16 Thousand/uL    RBC 4 39 3 88 - 5 62 Million/uL    Hemoglobin 13 3 12 0 - 17 0 g/dL    Hematocrit 38 6 36 5 - 49 3 %    MCV 88 82 - 98 fL    MCH 30 3 26 8 - 34 3 pg    MCHC 34 5 31 4 - 37 4 g/dL    RDW 15 0 11 6 - 15 1 %    MPV 11 7 8 9 - 12 7 fL    Platelets 26 (LL) 274 - 390 Thousands/uL    nRBC 0 /100 WBCs    Neutrophils Relative 81 (H) 43 - 75 %    Lymphocytes Relative 12 (L) 14 - 44 %    Monocytes Relative 7 4 - 12 %    Eosinophils Relative 0 0 - 6 %    Basophils Relative 0 0 - 1 %    Neutrophils Absolute 1 97 1 85 - 7 62 Thousands/µL    Lymphocytes Absolute 0 29 (L) 0 60 - 4 47 Thousands/µL    Monocytes Absolute 0 18 0 17 - 1 22 Thousand/µL    Eosinophils Absolute 0 00 0 00 - 0 61 Thousand/µL    Basophils Absolute 0 00 0 00 - 0 10 Thousands/µL   Comprehensive metabolic panel    Collection Time: 04/23/18  9:18 PM   Result Value Ref Range    Sodium 139 136 - 145 mmol/L    Potassium 3 8 3 5 - 5 3 mmol/L    Chloride 103 100 - 108 mmol/L    CO2 26 21 - 32 mmol/L    Anion Gap 10 4 - 13 mmol/L    BUN 18 5 - 25 mg/dL    Creatinine 1 18 0 60 - 1 30 mg/dL    Glucose 126 65 - 140 mg/dL    Calcium 8 4 8 3 - 10 1 mg/dL    AST 26 5 - 45 U/L    ALT 42 12 - 78 U/L    Alkaline Phosphatase 44 (L) 46 - 116 U/L    Total Protein 5 8 (L) 6 4 - 8 2 g/dL    Albumin 4 0 3 5 - 5 0 g/dL    Total Bilirubin 0 91 0 20 - 1 00 mg/dL    eGFR 65 ml/min/1 73sq m   Lipase    Collection Time: 04/23/18  9:18 PM   Result Value Ref Range    Lipase 73 73 - 393 u/L   Lactic acid, plasma    Collection Time: 04/23/18  9:18 PM   Result Value Ref Range    LACTIC ACID 0 7 0 5 - 2 0 mmol/L   ED Urine Macroscopic    Collection Time: 04/23/18 10:33 PM   Result Value Ref Range    Color, UA Yellow     Clarity, UA Clear     pH, UA 8 5 (H) 4 5 - 8 0    Leukocytes, UA Negative Negative    Nitrite, UA Negative Negative    Protein, UA Negative Negative mg/dl    Glucose, UA Negative Negative mg/dl    Ketones, UA Negative Negative mg/dl    Urobilinogen, UA 1 0 0 2, 1 0 E U /dl E U /dl    Bilirubin, UA Negative Negative    Blood, UA Small (A) Negative    Specific Gravity, UA 1 015 1 003 - 1 030   Urine Microscopic    Collection Time: 04/23/18 10:33 PM   Result Value Ref Range    RBC, UA 4-10 (A) None Seen, 0-5 /hpf    WBC, UA None Seen None Seen, 0-5, 5-55, 5-65 /hpf    Epithelial Cells Occasional None Seen, Occasional /hpf    Bacteria, UA Occasional None Seen, Occasional /hpf    AMORPH PHOSPATES Moderate /hpf    MUCOUS THREADS None Seen None Seen   Influenza A/B and RSV by PCR (indicated for patients >2 mo of age)    Collection Time: 04/23/18 11:18 PM   Result Value Ref Range    INFLU A PCR None Detected None Detected    INFLU B PCR None Detected None Detected    RSV PCR None Detected None Detected   Fingerstick Glucose (POCT)    Collection Time: 04/24/18 12:51 AM   Result Value Ref Range    POC Glucose 142 (H) 65 - 140 mg/dl   Basic metabolic panel    Collection Time: 04/24/18  6:06 AM   Result Value Ref Range    Sodium 141 136 - 145 mmol/L    Potassium 3 8 3 5 - 5 3 mmol/L    Chloride 105 100 - 108 mmol/L    CO2 26 21 - 32 mmol/L    Anion Gap 10 4 - 13 mmol/L    BUN 15 5 - 25 mg/dL    Creatinine 1 10 0 60 - 1 30 mg/dL    Glucose 118 65 - 140 mg/dL    Glucose, Fasting 118 (H) 65 - 99 mg/dL    Calcium 8 7 8 3 - 10 1 mg/dL    eGFR 71 ml/min/1 73sq m   CBC (With Platelets)    Collection Time: 04/24/18  6:06 AM   Result Value Ref Range    WBC 1 83 (LL) 4 31 - 10 16 Thousand/uL    RBC 4 38 3 88 - 5 62 Million/uL    Hemoglobin 13 1 12 0 - 17 0 g/dL    Hematocrit 38 8 36 5 - 49 3 %    MCV 89 82 - 98 fL    MCH 29 9 26 8 - 34 3 pg    MCHC 33 8 31 4 - 37 4 g/dL    RDW 15 2 (H) 11 6 - 15 1 %    Platelets 27 (LL) 228 - 390 Thousands/uL    MPV 12 3 8 9 - 12 7 fL   Fingerstick Glucose (POCT)    Collection Time: 04/24/18  7:59 AM   Result Value Ref Range    POC Glucose 109 65 - 140 mg/dl   Fingerstick Glucose (POCT)    Collection Time: 04/24/18 11:05 AM   Result Value Ref Range    POC Glucose 116 65 - 140 mg/dl         Xr Chest 2 Views    Result Date: 4/23/2018  Narrative: CHEST INDICATION:   fever  History of CLL  Fever and head pain  COMPARISON:  Chest radiographs October 7, 2017 EXAM PERFORMED/VIEWS:  XR CHEST PA & LATERAL  The frontal view was performed utilizing dual energy radiographic technique  Images: 4 FINDINGS: Heart shadow appears unremarkable  Atherosclerotic vascular calcifications are noted  Right-sided [id number] catheter with tip at cavoatrial junction  The lungs are clear  No pneumothorax or pleural effusion  Osseous structures appear within normal limits for patient age  Impression: No acute cardiopulmonary disease  Workstation performed: [id number]     Xr Shoulder 2+ Vw Left    Result Date: 4/17/2018  Narrative: LEFT SHOULDER INDICATION:   M25 511: Pain in right shoulder M25 512: Pain in left shoulder   COMPARISON:  None VIEWS:  XR SHOULDER 2+ VW LEFT FINDINGS: There is no acute fracture or dislocation  Moderate osteoarthritis glenohumeral joint  No lytic or blastic lesions are seen  Soft tissues are unremarkable  Impression: 1  No acute osseous abnormality  2   Degenerative changes as described  Workstation performed: [id number]     Xr Shoulder 2+ Vw Right    Result Date: 4/17/2018  Narrative: RIGHT SHOULDER INDICATION:   M25 511: Pain in right shoulder M25 512: Pain in left shoulder  COMPARISON:  None VIEWS:  XR SHOULDER 2+ VW RIGHT FINDINGS: There is no acute fracture or dislocation  No significant degenerative changes  No lytic or blastic lesions are seen  Soft tissues are unremarkable  Right internal jugular chest port noted  Impression: No acute osseous abnormality  Workstation performed: [id number]     Ct Abdomen Pelvis With Contrast    Result Date: 4/23/2018  Narrative: CT ABDOMEN AND PELVIS WITH IV CONTRAST INDICATION:   luq pain/fever  COMPARISON: CT abdomen 8/16/2010 TECHNIQUE:  CT examination of the abdomen and pelvis was performed  Axial, sagittal, and coronal 2D reformatted images were created from the source data and submitted for interpretation  Radiation dose length product (DLP) for this visit:  791 mGy-cm   This examination, like all CT scans performed in the [facility], was performed utilizing techniques to minimize radiation dose exposure, including the use of iterative reconstruction and automated exposure control  IV Contrast:  100 mL of iohexol (OMNIPAQUE) Enteric Contrast:  Enteric contrast was not administered  FINDINGS: ABDOMEN LOWER CHEST:  Atelectatic changes are noted at the lung bases  LIVER/BILIARY TREE:  Unremarkable  GALLBLADDER:  There are gallstone(s) within the gallbladder, without pericholecystic inflammatory changes  SPLEEN:  The spleen is enlarged, measuring 17 5 cm in craniocaudal dimension  PANCREAS:  Unremarkable  ADRENAL GLANDS:  Unremarkable  KIDNEYS/URETERS:  Unremarkable  No hydronephrosis   STOMACH AND BOWEL: There is a small hiatal hernia with thickening of the distal esophagus  Mild bowel wall thickening involving a segment of the transverse colon (series 601 image 31)  APPENDIX:  A normal appendix was visualized  ABDOMINOPELVIC CAVITY:  No ascites or free intraperitoneal air  No lymphadenopathy  VESSELS:  More significant atherosclerotic vascular calcifications are noted than would be expected for the patient's age  No evidence of aortic aneurysm  PELVIS REPRODUCTIVE ORGANS:  Unremarkable for patient's age  URINARY BLADDER:  Unremarkable  ABDOMINAL WALL/INGUINAL REGIONS:  Small bilateral fat-containing inguinal hernia  OSSEOUS STRUCTURES:  No acute fracture or destructive osseous lesion  Impression: 1  Splenomegaly measuring 17 5 cm in craniocaudal dimension  No focal splenic lesion  2   Cholelithiasis without other evidence of acute cholecystitis  3   Small hiatal hernia with circumferential thickening of the distal esophagus  Correlate for evidence of reflux esophagitis and consider endoscopy if clinically warranted  4   Mild bowel wall thickening of a portion of the transverse colon may represent peristalsis versus focal colitis  Correlate clinically for evidence of colitis  Workstation performed: [id number]       Assessment:  Chronic lymphocytic leukemia which is under control with ibrutinib  Gram-positive cristhian bacteremia  Plan:  A 76-year-old gentleman with history as described above  He has gram-positive cristhian bacteremia, although he does not necessarily has neutropenia  However, he has leukopenia as well as thrombocytopenia  He has no anemia  He is on IV antibiotics, recommended by infectious disease specialist   At this moment, I would hold ibrutinib until his infection is under control  There is no other hematologic intervention is being planned  Patient is in agreement with my recommendations

## 2018-04-24 NOTE — CONSULTS
Consultation - Infectious Disease   Deborah Uriarte 60 y o  male MRN: [id number]  Unit/Bed#: [facility] 2 -02 Encounter: [id number]      IMPRESSION & RECOMMENDATIONS:   Impression/Recommendations:  79-year-old male with history of CLL on chemotherapy, chronic neutropenia admitted on 04/23 with acute onset fever and 1 episode of vomiting  1   Fever  Patient states he had a fever of 101 7 at home  No documented fevers here  No actual associated neutropenia  Unclear cause at this point  May be related to CT findings suggestive of mild focal colitis as patient did have an episode of emesis as well as loose stools  Consideration for viral etiology for this  Also consideration for bacteremia  One of 2 blood cultures is positive for gram-positive rods, the significance of which is unclear at this point  May be a contaminant  No clinical or radiographic evidence of pneumonia  For now, will continue on antibiotic as we await further blood culture data  Fortunately, patient remains hemodynamically stable, nontoxic     -discontinue Levaquin  -start cefepime 2 g IV q 12 hours  -monitor temperatures  -check CBC with diff in the a m   -monitor hemodynamics    2  Positive blood culture  One of 2 blood cultures is positive for gram-positive rods  Unclear significance  May be a contaminant  Patient does not have a port or other indwelling line  Will follow up culture data to guide further recommendations  -followup pending blood cultures to guide further recommendations    3  Mild colitis  Seen on CT abdomen/pelvis  May be the cause for #1  May be viral in origin  Today, patient has no nausea or vomiting  No abdominal pain  Abdominal exam is benign  He does have loose bowel movements, but states this has been an ongoing issue     -serial abdominal exams  -monitor symptoms closely    4  Prior history of HSV esophagitis  Patient currently on oral acyclovir prophylactic dose  5   Thrombocytopenia  Most likely secondary to chemotherapy  Platelet count stable  Hematology/oncology evaluation noted  Continue to monitor closely  6   CLL on chemotherapy  Patient is currently leukopenic, not neutropenic  Ibrutinib currently on hold by Oncology in the setting of possible infection  Will hopefully be able to re-initiate soon  Antibiotics:  Levaquin    Spoke with patient in detail regarding plan of care  Spoke with primary team regarding plan of care  Thank you for this consultation  We will follow along with you  HISTORY OF PRESENT ILLNESS:  Reason for Consult:  Fever    HPI: Phyllis Wang is a 60 y o  male with history of CLL, pancytopenia on chemotherapy, prior history of HSV esophagitis now admitted on 04/23 with acute onset fever of 101 7 at home and 1 episode of vomiting  Patient came straight to the hospital for further evaluation  In the last 7 months, patient has been receiving oral Ibrutinib with good response  Currently not neutropenic  On admission, patient was given Levaquin  Today, he states he is not having any more fevers  Denies nausea, vomiting, chills, sweats, chest pain, shortness of breath, cough, new rashes, abdominal pain  He states he has chronic loose stools  He does also have intermittent left upper quadrant fullness, but no actual pain  He underwent a CT abdomen/pelvis on admission which was suggestive of mild focal colitis  Now 1 of 2 blood cultures drawn on admission is positive for gram-positive rods  Patient does not have a port in place  REVIEW OF SYSTEMS:    A complete system-based review of systems is otherwise negative      PAST MEDICAL HISTORY:  Past Medical History:   Diagnosis Date    CAD (coronary artery disease) 2004    Cellulitis of scalp     CKD (chronic kidney disease) stage 3, GFR 30-59 ml/min     [facility]     [facility]ch[facility]     Diabetes mellitus [facility]     H/O blood clots     Hemolytic anemia (HCC)     History of TIA (transient ischemic attack)     Hyperlipidemia     Hypertension     Multiple gastric ulcers     [facility] infarction [facility]     Recurrent sinusitis     Thrombocytopenia (HCC)     Vertigo      Past Surgical History:   Procedure Laterality Date    ARTHROSCOPIC REPAIR ACL      lt knee    CARDIAC SURGERY      cardiac cath with stent    KNEE ARTHROSCOPY      PORTACATH PLACEMENT      ND ESOPHAGOGASTRODUODENOSCOPY TRANSORAL DIAGNOSTIC N/A 10/5/2017    Procedure: ESOPHAGOGASTRODUODENOSCOPY (EGD) with bx;  Surgeon: Clemente Castro DO;  Location: [facility]; Service: Gastroenterology    ND ESOPHAGOGASTRODUODENOSCOPY TRANSORAL DIAGNOSTIC N/A 3/8/2018    Procedure: ESOPHAGOGASTRODUODENOSCOPY (EGD) with biopsy;  Surgeon: Clemente Castro DO;  Location: [facility]; Service: Gastroenterology    ND ESOPHAGOSCOPY FLEXIBLE TRANSORAL WITH BIOPSY N/A 2016    Procedure: ESOPHAGOGASTRODUODENOSCOPY (EGD); ESOPHAGEAL DILATION; ESOPHAGEAL BIOPSY;  Surgeon: Duane Eagle MD;  [facility]: [facility];  Service: Thoracic    TONSILLECTOMY      UPPER GASTROINTESTINAL ENDOSCOPY      x 6       FAMILY HISTORY:  Non-contributory    SOCIAL HISTORY:  History   Alcohol Use No     History   Drug Use No     History   Smoking Status    Former Smoker   Smokeless Tobacco    Never Used     Comment: pt refuses to answer question       ALLERGIES:  Allergies   Allergen Reactions    Heparin Other (See Comments)     Other reaction(s): Blood Disorders  clot    Macrolides And Ketolides Other (See Comments)     Interacts with other meds he is taking       MEDICATIONS:  All current active medications have been reviewed      PHYSICAL EXAM:  Vitals:  HR:  [] 90  Resp:  [20-24] 20  BP: (118-155)/(62-85) 136/80  SpO2:  [94 %-98 %] 97 %  Temp (24hrs), Av 5 °F (36 9 °C), Min:95 8 °F (35 4 °C), Max:99 9 °F (37 7 °C)  Current: Temperature: 98 2 °F (36 8 °C)     Physical Exam:  General: Well-nourished, well-developed, in no acute distress  Eyes:  Conjunctive clear with no hemorrhages or effusions  Oropharynx:  No ulcers, no lesions  Neck:  Supple, no lymphadenopathy  Lungs:  Clear to auscultation bilaterally, no accessory muscle use  Cardiac:  Regular rate and rhythm, no murmurs  Abdomen:  Soft, non-tender, non-distended  Extremities:  No peripheral cyanosis, clubbing, or edema  Skin:  No rashes, no ulcers  Neurological:  Moves all four extremities spontaneously, sensation grossly intact      LABS, IMAGING, & OTHER STUDIES:  Lab Results:  I have personally reviewed pertinent labs  Results from last 7 days  Lab Units 04/24/18  0606 04/23/18  2118 04/23/18  1132 04/23/18  0832   SODIUM mmol/L 141 139 139 143   POTASSIUM mmol/L 3 8 3 8 3 8 3 9   CHLORIDE mmol/L 105 103 103 107   CO2 mmol/L 26 26 29 26   ANION GAP mmol/L 10 10 7 10   BUN mg/dL 15 18 19 17   CREATININE mg/dL 1 10 1 18 1 29 1 30   EGFR ml/min/1 73sq m 71 65 59 58   GLUCOSE RANDOM mg/dL 118 126 124 132   CALCIUM mg/dL 8 7 8 4 8 7 8 7   AST U/L  --  26 22 29   ALT U/L  --  42 39 32   ALK PHOS U/L  --  44* 52 52   TOTAL PROTEIN g/dL  --  5 8* 6 5 5 8*   BILIRUBIN TOTAL mg/dL  --  0 91 1 20* 1 20*       Results from last 7 days  Lab Units 04/24/18  0606 04/23/18 2118 04/23/18  1132   WBC Thousand/uL 1 83* 2 44* 2 99*   HEMOGLOBIN g/dL 13 1 13 3 14 0   PLATELETS Thousands/uL 27* 26* 30*       Results from last 7 days  Lab Units 04/23/18  2318 04/23/18  1132   GRAM STAIN RESULT   --  Gram positive rods   INFLUENZA A PCR  None Detected  --    INFLUENZA B PCR  None Detected  --    RSV PCR  None Detected  --      Imaging Studies:   I have personally reviewed pertinent imaging study reports and images in PACS  CT abdomen/pelvis shows splenomegaly  No focal splenic lesion  No evidence of acute cholecystitis  Thickening of the distal esophagus  Mild bowel wall thickening which may represent focal colitis      Chest x-ray shows no acute cardiopulmonary disease  EKG, Pathology, and Other Studies:   I have personally reviewed pertinent reports

## 2018-04-25 PROBLEM — D69.6 THROMBOCYTOPENIA (HCC): Status: ACTIVE | Noted: 2018-04-25

## 2018-04-25 PROBLEM — K52.9 COLITIS, ACUTE: Status: ACTIVE | Noted: 2018-04-24

## 2018-04-25 PROBLEM — R78.81 POSITIVE BLOOD CULTURE: Status: ACTIVE | Noted: 2018-04-25

## 2018-04-25 LAB
GLUCOSE SERPL-MCNC: 132 MG/DL (ref 65–140)
GLUCOSE SERPL-MCNC: 137 MG/DL (ref 65–140)
GLUCOSE SERPL-MCNC: 139 MG/DL (ref 65–140)
GLUCOSE SERPL-MCNC: 143 MG/DL (ref 65–140)
GLUCOSE SERPL-MCNC: 197 MG/DL (ref 65–140)

## 2018-04-25 PROCEDURE — 99232 SBSQ HOSP IP/OBS MODERATE 35: CPT | Performed by: INTERNAL MEDICINE

## 2018-04-25 PROCEDURE — 82948 REAGENT STRIP/BLOOD GLUCOSE: CPT

## 2018-04-25 RX ORDER — SODIUM CHLORIDE 9 MG/ML
20 INJECTION, SOLUTION INTRAVENOUS CONTINUOUS
Status: DISCONTINUED | OUTPATIENT
Start: 2018-04-25 | End: 2018-04-27

## 2018-04-25 RX ADMIN — CEFEPIME HYDROCHLORIDE 2000 MG: 1 INJECTION, POWDER, FOR SOLUTION INTRAMUSCULAR; INTRAVENOUS at 15:23

## 2018-04-25 RX ADMIN — ACYCLOVIR 400 MG: 200 CAPSULE ORAL at 21:44

## 2018-04-25 RX ADMIN — ACYCLOVIR 400 MG: 200 CAPSULE ORAL at 09:26

## 2018-04-25 RX ADMIN — ASPIRIN 81 MG 81 MG: 81 TABLET ORAL at 08:24

## 2018-04-25 RX ADMIN — GABAPENTIN 600 MG: 300 CAPSULE ORAL at 08:24

## 2018-04-25 RX ADMIN — FENOFIBRATE 144 MG: 48 TABLET, FILM COATED ORAL at 09:26

## 2018-04-25 RX ADMIN — GABAPENTIN 600 MG: 300 CAPSULE ORAL at 17:38

## 2018-04-25 RX ADMIN — CITALOPRAM HYDROBROMIDE 40 MG: 20 TABLET ORAL at 08:24

## 2018-04-25 RX ADMIN — CEFEPIME HYDROCHLORIDE 2000 MG: 2 INJECTION, SOLUTION INTRAVENOUS at 03:12

## 2018-04-25 RX ADMIN — SODIUM CHLORIDE 20 ML/HR: 0.9 INJECTION, SOLUTION INTRAVENOUS at 17:39

## 2018-04-25 RX ADMIN — PANTOPRAZOLE SODIUM 40 MG: 40 TABLET, DELAYED RELEASE ORAL at 06:08

## 2018-04-25 RX ADMIN — FOLIC ACID 1 MG: 1 TABLET ORAL at 08:24

## 2018-04-25 RX ADMIN — PREDNISONE 10 MG: 5 TABLET ORAL at 08:24

## 2018-04-25 NOTE — TELEPHONE ENCOUNTER
Called and spoke with the patient's wife, and [facility] [place]  Patient's wife reported that patient is in the hospital with sepsis  She was made aware to call us back to schedule a hospital f/u when he is discharged

## 2018-04-25 NOTE — SOCIAL WORK
CM met with patient at bedside to address discharge needs  Wrote name & number on white board upon entering room while introducing oneself and explaining role in patients care  Patient lives in a house with his wife, Ashley Murray, his mother-in-law, his son, and sons girlfriend; [address]  Patients bedroom is located on the 3rd floor, patient denies difficulty with steps  Patient is independent with ADLs and functional mobility  Support system is entire family  Food shopping & meal prep is done by patient  Patient does not use DMEs  Patient is able to ambulate without assistance from a seated or laying position  Hx of VNA reported; most likely [facility]  Patient is retired  PCP identified as Dr Ginny Golden  POA identified as wife, as well as spokesperson for the family and designated caregiver if/when needed  [facility] for Rx needs; patient made aware of [address] to use at discharge if needed  Patient does drive; reports wife or son available at discharge to transport home  Help/support reported when patient is home

## 2018-04-25 NOTE — TELEPHONE ENCOUNTER
Called Y[facility] and confirmed that prescription was not cancelled and that patient is scheduled this week for delivery of his medication  Patient was notified of delivery of his medication

## 2018-04-25 NOTE — PROGRESS NOTES
Progress Note Wang Garcia [birth year], 60 y o  male MRN: [id number]    Unit/Bed#: [facility] [address] 218-02 Encounter: [id number]    Primary Care Provider: Candida Allison MD   Date and time admitted to hospital: 4/23/2018  7:12 PM      * Fever   Assessment & Plan    With leukopenia  No neutropenia  Cefepime until blood cultures finalize        Thrombocytopenia ([address] )   Assessment & Plan    Secondary to chemo/CLL        Positive blood culture   Assessment & Plan    Likely contaminant  Infectious Disease following        Colitis, acute   Assessment & Plan    May be responsible for fever  Self-limiting and resolving        Gastroesophageal reflux disease without esophagitis   Assessment & Plan    Continue pantoprazole        Hyperlipidemia   Assessment & Plan    Continue fenofibrate        CLL [facility]chronic lymphocytic leukemia) ([facility])   Assessment & Plan    Follow-up with Hematology post discharge  Continue prednisone        VTE Pharmacologic Prophylaxis: Pharmacologic VTE Prophylaxis contraindicated due to thrombocytopenia    Patient Centered Rounds: I have performed bedside rounds with nursing staff today  Discussions with Specialists or Other Care Team Provider:  Infectious Diseases    Education and Discussions with Family / Patient:     Time Spent for Care: 25 mins  More than 50% of total time spent on counseling and coordination of care as described above  Current Length of Stay: 1 day(s)    Current Patient Status: Inpatient   Certification Statement: The patient will continue to require additional inpatient hospital stay due to positive blood culture in fever    Discharge Plan / Estimated Discharge Date:     Code Status: Level 1 - Full Code  ______________________________________________________________________________    Subjective:   Patient seen and examined  No new complaints    No further diarrhea    Objective:   Vitals: Blood pressure 149/82, pulse 73, temperature (!) 97 1 °F (36 2 °C), temperature source Temporal, resp  rate 20, SpO2 97 %  Physical Exam:   General appearance: alert, appears stated age and cooperative  Head: Normocephalic, without obvious abnormality, atraumatic  Lungs: clear to auscultation bilaterally  Heart: regular rate and rhythm  Abdomen: soft, non-tender, positive bowel sounds   Back: negative  Extremities: extremities atraumatic, no cyanosis or edema  Neurologic: Grossly normal    Additional Data:   Labs:    Results from last 7 days  Lab Units 04/24/18  0606 04/23/18 2118 04/23/18  1132 04/23/18  0832   WBC Thousand/uL 1 83* 2 44* 2 99* 4 10*   HEMOGLOBIN g/dL 13 1 13 3 14 0 13 6   HEMATOCRIT % 38 8 38 6 41 4 41 9   MCV fL 89 88 89 89   PLATELETS Thousands/uL 27* 26* 30* 25*       Results from last 7 days  Lab Units 04/24/18  0606 04/23/18 2118 04/23/18  1132 04/23/18  0832   SODIUM mmol/L 141 139 139 143   POTASSIUM mmol/L 3 8 3 8 3 8 3 9   CHLORIDE mmol/L 105 103 103 107   CO2 mmol/L 26 26 29 26   ANION GAP mmol/L 10 10 7 10   BUN mg/dL 15 18 19 17   CREATININE mg/dL 1 10 1 18 1 29 1 30   CALCIUM mg/dL 8 7 8 4 8 7 8 7   BILIRUBIN TOTAL mg/dL  --  0 91 1 20* 1 20*   ALK PHOS U/L  --  44* 52 52   ALT U/L  --  42 39 32   AST U/L  --  26 22 29   EGFR ml/min/1 73sq m 71 65 59 58   GLUCOSE RANDOM mg/dL 118 126 124 132           Results from last 7 days  Lab Units 04/23/18  1132   TROPONIN I ng/mL <0 02            Results from last 7 days  Lab Units 04/23/18 2118 04/23/18  1132   LACTIC ACID mmol/L 0 7 1 1       Results from last 7 days  Lab Units 04/25/18  1135 04/25/18  0757 04/24/18  2116 04/24/18  1708 04/24/18  1535 04/24/18  1105 04/24/18  0759 04/24/18  0051 04/23/18  2113   POC GLUCOSE mg/dl 137 132 224* 217* 240* 116 109 142* 119             * I Have Reviewed All Lab Data Listed Above  Cultures:     Results from last 7 days  Lab Units 04/23/18  2318 04/23/18  1132   BLOOD CULTURE   --  No Growth at 24 hrs     GRAM STAIN RESULT   --  Gram positive rods   INFLUENZA A PCR None Detected  --    INFLUENZA B PCR  None Detected  --    RSV PCR  None Detected  --          Imaging:  Imaging Reports Reviewed Today Include:       Procedure: Ct Abdomen Pelvis With Contrast  Result Date: 4/23/2018  Impression: 1  Splenomegaly measuring 17 5 cm in craniocaudal dimension  No focal splenic lesion  2   Cholelithiasis without other evidence of acute cholecystitis  3   Small hiatal hernia with circumferential thickening of the distal esophagus  Correlate for evidence of reflux esophagitis and consider endoscopy if clinically warranted  4   Mild bowel wall thickening of a portion of the transverse colon may represent peristalsis versus focal colitis  Correlate clinically for evidence of colitis   Workstation performed: [id number]         Scheduled Meds:  Current Facility-Administered Medications:  acetaminophen 650 mg Oral Q6H PRN Robert Morrissey PA-C    acyclovir 400 mg Oral Q12H [address] Robert Morrissey PA-C    albuterol 2 puff Inhalation 4x Daily PRN Robert Morrissey PA-C    aluminum-magnesium hydroxide-simethicone 30 mL Oral Q6H PRN Robert Morrissey PA-C    aspirin 81 mg Oral Every Other Day Robert Morrissey PA-C    cefepime 2,000 mg Intravenous Q12H Aurelia Kessler DO Last Rate: Stopped (04/25/18 0342)   citalopram 40 mg Oral Daily Robert Morrissey PA-C    fenofibrate 144 mg Oral Daily Robert Morrissey PA-C    folic acid 1 mg Oral Daily Robert Morrissey PA-C    gabapentin 600 mg Oral BID Robert Morrissey PA-C    insulin lispro 1-5 Units Subcutaneous HS Robert Morrissey PA-C    insulin lispro 1-6 Units Subcutaneous TID AC Robert Morrissey PA-C    morphine injection 2 mg Intravenous Q4H PRN Robert Morrissey PA-C    ondansetron 4 mg Intravenous Q6H PRN Robert Morrissey PA-C    pantoprazole 40 mg Oral Early Morning Robert Morrissey PA-C    predniSONE 10 mg Oral Daily Robert Morrissey PA-C      Continuous Infusions:   PRN Meds:    acetaminophen    albuterol    aluminum-magnesium hydroxide-simethicone   morphine injection    ondansetron     Fernie Dunaway DO

## 2018-04-25 NOTE — PROGRESS NOTES
Progress Note - Infectious Disease   Era Slider 60 y o  male MRN: [id number]  Unit/Bed#: [facility] 2 -02 Encounter: [id number]      Impression/Recommendations:  50-year-old male with history of CLL on chemotherapy, chronic neutropenia admitted on 04/23 with acute onset fever and 1 episode of vomiting      1  Fever  Patient states he had a fever of 101 7 at home  No documented fevers here  No actual associated neutropenia  Unclear cause at this point  May be related to CT findings suggestive of mild focal colitis as patient did have an episode of emesis as well as loose stools  Consideration for viral etiology for this  Also consideration for bacteremia  One of 2 blood cultures is positive for gram-positive rods, the significance of which is unclear at this point  May be a contaminant  No clinical or radiographic evidence of pneumonia  For now, will continue on antibiotic as we await further blood culture data  Fortunately, patient remains hemodynamically stable, nontoxic  He has remained afebrile  Remains clinically well with no new focal complaints      -  Continue cefepime 2 g IV q 12 hours for now  -  Followup blood culture data  -monitor temperatures  -check CBC with diff in the a m   -monitor hemodynamics     2  Positive blood culture  One of 2 blood cultures is positive for gram-positive rods  The other set is negative thus far  Unclear significance  May be a skin contaminant  Patient does not have a port or other indwelling line  Will follow up culture data to guide further recommendations      -followup pending blood cultures to guide further recommendations     3  Mild colitis  Seen on CT abdomen/pelvis  May be the cause for #1  May be viral in origin  Nausea and vomiting has improved with no subsequent events  No abdominal pain  Abdominal exam is benign  He does have loose bowel movements, but states this has been an ongoing issue   Has not had any loose bowel movements today      -serial abdominal exams  -monitor symptoms closely     4   Prior history of HSV esophagitis  Patient currently on oral acyclovir prophylactic dose      5  Thrombocytopenia  Most likely secondary to chemotherapy  Platelet count stable  Hematology/oncology evaluation noted  Continue to monitor closely      6  CLL on chemotherapy  Patient is currently leukopenic, not neutropenic  Ibrutinib currently on hold by Oncology in the setting of possible infection  Will hopefully be able to re-initiate soon      Antibiotics:   cefepime D2   antibiotic D3     Spoke with patient in detail regarding plan of care  Spoke with primary team regarding plan of care  Subjective:  Patient states he feels much better  Denies fevers, chills, or sweats  Denies nausea, vomiting, or diarrhea  Denies shortness of breath, cough  No rashes  No abdominal pain  No urinary symptoms  Objective:  Vitals:  HR:  [66-73] 73  Resp:  [20] 20  BP: (129-170)/(63-88) 149/82  SpO2:  [97 %] 97 %  Temp (24hrs), Av 3 °F (36 3 °C), Min:97 °F (36 1 °C), Max:97 7 °F (36 5 °C)  Current: Temperature: (!) 97 1 °F (36 2 °C)    Physical Exam:   General:  No acute distress  Psychiatric:  Awake and alert  Pulmonary:  Normal respiratory excursion without accessory muscle use  Abdomen:  Soft, nontender  Extremities:  No edema  Skin:  No rashes    Lab Results:  I have personally reviewed pertinent labs      Results from last 7 days  Lab Units 18  0606 18  2118 18  1132 18  0832   SODIUM mmol/L 141 139 139 143   POTASSIUM mmol/L 3 8 3 8 3 8 3 9   CHLORIDE mmol/L 105 103 103 107   CO2 mmol/L 26 26 29 26   ANION GAP mmol/L 10 10 7 10   BUN mg/dL 15 18 19 17   CREATININE mg/dL 1 10 1 18 1 29 1 30   EGFR ml/min/1 73sq m 71 65 59 58   GLUCOSE RANDOM mg/dL 118 126 124 132   CALCIUM mg/dL 8 7 8 4 8 7 8 7   AST U/L  --  26 22 29   ALT U/L  --  42 39 32   ALK PHOS U/L  --  44* 52 52   TOTAL PROTEIN g/dL  --  5 8* 6 5 5 8* BILIRUBIN TOTAL mg/dL  --  0 91 1 20* 1 20*       Results from last 7 days  Lab Units 04/24/18  0606 04/23/18  2118 04/23/18  1132   WBC Thousand/uL 1 83* 2 44* 2 99*   HEMOGLOBIN g/dL 13 1 13 3 14 0   PLATELETS Thousands/uL 27* 26* 30*       Results from last 7 days  Lab Units 04/23/18  2318 04/23/18  1132   BLOOD CULTURE   --  No Growth at 24 hrs  GRAM STAIN RESULT   --  Gram positive rods   INFLUENZA A PCR  None Detected  --    INFLUENZA B PCR  None Detected  --    RSV PCR  None Detected  --        Imaging Studies:   I have personally reviewed pertinent imaging study reports and images in PACS  EKG, Pathology, and Other Studies:   I have personally reviewed pertinent reports

## 2018-04-26 PROBLEM — A32.9 LISTERIA INFECTION: Status: ACTIVE | Noted: 2018-04-25

## 2018-04-26 LAB
ALBUMIN SERPL BCP-MCNC: 3.8 G/DL (ref 3.5–5)
ALP SERPL-CCNC: 44 U/L (ref 46–116)
ALT SERPL W P-5'-P-CCNC: 43 U/L (ref 12–78)
ANION GAP SERPL CALCULATED.3IONS-SCNC: 8 MMOL/L (ref 4–13)
AST SERPL W P-5'-P-CCNC: 23 U/L (ref 5–45)
BILIRUB SERPL-MCNC: 0.74 MG/DL (ref 0.2–1)
BUN SERPL-MCNC: 22 MG/DL (ref 5–25)
CALCIUM SERPL-MCNC: 8.6 MG/DL (ref 8.3–10.1)
CHLORIDE SERPL-SCNC: 107 MMOL/L (ref 100–108)
CO2 SERPL-SCNC: 27 MMOL/L (ref 21–32)
CREAT SERPL-MCNC: 0.91 MG/DL (ref 0.6–1.3)
ERYTHROCYTE [DISTWIDTH] IN BLOOD BY AUTOMATED COUNT: 15 % (ref 11.6–15.1)
GFR SERPL CREATININE-BSD FRML MDRD: 89 ML/MIN/1.73SQ M
GLUCOSE SERPL-MCNC: 102 MG/DL (ref 65–140)
GLUCOSE SERPL-MCNC: 108 MG/DL (ref 65–140)
GLUCOSE SERPL-MCNC: 116 MG/DL (ref 65–140)
GLUCOSE SERPL-MCNC: 159 MG/DL (ref 65–140)
HCT VFR BLD AUTO: 39.3 % (ref 36.5–49.3)
HGB BLD-MCNC: 13.2 G/DL (ref 12–17)
MCH RBC QN AUTO: 29.6 PG (ref 26.8–34.3)
MCHC RBC AUTO-ENTMCNC: 33.6 G/DL (ref 31.4–37.4)
MCV RBC AUTO: 88 FL (ref 82–98)
PLATELET # BLD AUTO: 37 THOUSANDS/UL (ref 149–390)
PMV BLD AUTO: 12.1 FL (ref 8.9–12.7)
POTASSIUM SERPL-SCNC: 3.5 MMOL/L (ref 3.5–5.3)
PROT SERPL-MCNC: 5.7 G/DL (ref 6.4–8.2)
RBC # BLD AUTO: 4.46 MILLION/UL (ref 3.88–5.62)
SODIUM SERPL-SCNC: 142 MMOL/L (ref 136–145)
WBC # BLD AUTO: 1.75 THOUSAND/UL (ref 4.31–10.16)

## 2018-04-26 PROCEDURE — 80053 COMPREHEN METABOLIC PANEL: CPT | Performed by: INTERNAL MEDICINE

## 2018-04-26 PROCEDURE — 82948 REAGENT STRIP/BLOOD GLUCOSE: CPT

## 2018-04-26 PROCEDURE — 99233 SBSQ HOSP IP/OBS HIGH 50: CPT | Performed by: INTERNAL MEDICINE

## 2018-04-26 PROCEDURE — 99232 SBSQ HOSP IP/OBS MODERATE 35: CPT | Performed by: INTERNAL MEDICINE

## 2018-04-26 PROCEDURE — 85027 COMPLETE CBC AUTOMATED: CPT | Performed by: INTERNAL MEDICINE

## 2018-04-26 RX ADMIN — PREDNISONE 10 MG: 5 TABLET ORAL at 09:26

## 2018-04-26 RX ADMIN — ASPIRIN 81 MG 81 MG: 81 TABLET ORAL at 09:26

## 2018-04-26 RX ADMIN — AMPICILLIN SODIUM 2000 MG: 2 INJECTION, POWDER, FOR SOLUTION INTRAMUSCULAR; INTRAVENOUS at 15:35

## 2018-04-26 RX ADMIN — ACYCLOVIR 400 MG: 200 CAPSULE ORAL at 09:27

## 2018-04-26 RX ADMIN — ACYCLOVIR 400 MG: 200 CAPSULE ORAL at 22:00

## 2018-04-26 RX ADMIN — AMPICILLIN SODIUM 2000 MG: 2 INJECTION, POWDER, FOR SOLUTION INTRAMUSCULAR; INTRAVENOUS at 23:08

## 2018-04-26 RX ADMIN — FOLIC ACID 1 MG: 1 TABLET ORAL at 09:26

## 2018-04-26 RX ADMIN — AMPICILLIN SODIUM 2000 MG: 2 INJECTION, POWDER, FOR SOLUTION INTRAMUSCULAR; INTRAVENOUS at 11:44

## 2018-04-26 RX ADMIN — PANTOPRAZOLE SODIUM 40 MG: 40 TABLET, DELAYED RELEASE ORAL at 05:35

## 2018-04-26 RX ADMIN — CITALOPRAM HYDROBROMIDE 40 MG: 20 TABLET ORAL at 09:26

## 2018-04-26 RX ADMIN — GABAPENTIN 600 MG: 300 CAPSULE ORAL at 09:26

## 2018-04-26 RX ADMIN — AMPICILLIN SODIUM 2000 MG: 2 INJECTION, POWDER, FOR SOLUTION INTRAMUSCULAR; INTRAVENOUS at 19:14

## 2018-04-26 RX ADMIN — CEFEPIME HYDROCHLORIDE 2000 MG: 1 INJECTION, POWDER, FOR SOLUTION INTRAMUSCULAR; INTRAVENOUS at 03:25

## 2018-04-26 RX ADMIN — GABAPENTIN 600 MG: 300 CAPSULE ORAL at 17:47

## 2018-04-26 RX ADMIN — INSULIN LISPRO 1 UNITS: 100 INJECTION, SOLUTION INTRAVENOUS; SUBCUTANEOUS at 17:47

## 2018-04-26 RX ADMIN — INSULIN LISPRO 1 UNITS: 100 INJECTION, SOLUTION INTRAVENOUS; SUBCUTANEOUS at 22:00

## 2018-04-26 RX ADMIN — FENOFIBRATE 144 MG: 48 TABLET, FILM COATED ORAL at 09:26

## 2018-04-26 NOTE — PROGRESS NOTES
Progress Note - Infectious Disease   Maral Kenyon 60 y o  male MRN: [id number]  Unit/Bed#: [facility] 68 2 -02 Encounter: [id number]         Impression/Recommendations:  58-year-old male with history of CLL on chemotherapy, chronic neutropenia admitted on 04/23 with acute onset fever and 1 episode of vomiting      1   Fever  Dorlene Postin states he had a fever of 101 7 at home   No documented fevers here   No actual associated neutropenia  Likely all related to #2  Patient has been on IV cefepime and has remained afebrile   Fortunately, patient remains hemodynamically stable, nontoxic  Remains clinically well with no new focal complaints      -  Antibiotic plan as stated below  -  Continue to follow up blood culture data  -monitor temperatures  -check CBC with diff in the a m   -monitor hemodynamics     2  Listeria bacteremia   One of 2 blood cultures drawn on admission now positive for listeria monocytogenes  The other set is negative thus far  Consideration that patient had transient bacteremia in the setting of gastroenteritis  Likely obtained from contaminated food  Patient has been on IV cefepime, which does not have appropriate coverage for this organism, and remains clinically improved with no recurrence of fevers  Hemodynamics remain stable  No clinical signs suggestive of meningitis  Due to the risk of recurrence of infection, especially in an immunocompromised patient, would treat this infection with a course of IV antibiotic  Recommended treatment is IV ampicillin  We could also add IV gentamicin, but since patient has already clinically improved with no signs of meningitis, I think the risks with prolonged gentamicin use (including renal, hearing, vestibular dysfunction) outweigh the benefits in this case  I expressed all of this to the patient and he is in agreement with this plan  - discontinue cefepime  - start high-dose ampicillin 2 g IV q 4 hours  Tentative plan for 3 weeks, through 5/16  Patient may be able to get his antibiotic through his port  We can consult with his primary oncologist regarding this  - check TTE  - follow up final blood cultures  - will need to follow up with outpatient infectious diseases     3   Mild colitis   Seen on CT abdomen/pelvis   May be the cause for #1 and #2  Nausea and vomiting has improved with no subsequent events   No abdominal pain   Abdominal exam is benign  Kennedy Ly does have loose bowel movements, but states this has been an ongoing issue  Currently not having loose bowel movements  Tolerating oral diet      -serial abdominal exams  -monitor symptoms closely     4   Prior history of HSV esophagitis   Patient currently on oral acyclovir prophylactic dose      5   Thrombocytopenia   Most likely secondary to chemotherapy   Platelet count stable   Hematology/oncology evaluation noted   Continue to monitor closely      6   CLL on chemotherapy   Patient is currently leukopenic, not neutropenic  Ibrutinib currently on hold by Oncology in the setting of possible infection   Will hopefully be able to re-initiate soon      Antibiotics:   cefepime D3   antibiotic D4     Spoke with patient in detail regarding plan of care  Spoke with primary team regarding plan of care  Subjective:   patient feels well  Denies shortness of breath, cough  Tolerating oral diet  Denies fevers, chills, or sweats  Denies nausea, vomiting, or diarrhea        Objective:  Vitals:  HR:  [68-94] 70  Resp:  [19-20] 20  BP: (146-151)/(71-79) 151/71  SpO2:  [97 %-99 %] 99 %  Temp (24hrs), Av 5 °F (36 4 °C), Min:97 2 °F (36 2 °C), Max:97 9 °F (36 6 °C)  Current: Temperature: 97 9 °F (36 6 °C)    Physical Exam:   General:  Well-nourished, well-developed, in no acute distress  Eyes:  Conjunctive clear with no hemorrhages or effusions  Oropharynx:  No ulcers, no lesions  Neck:  Supple, no lymphadenopathy  Lungs:  Clear to auscultation bilaterally, no accessory muscle use  Cardiac:  Regular rate and rhythm, no murmurs  Abdomen:  Soft, non-tender, non-distented  Extremities:  No peripheral cyanosis, clubbing, or edema  Skin:  No rashes, no ulcers  Neurological:  Moves all four extremities spontaneously, sensation grossly intact    Right chest wall port with no surrounding erythema or tenderness    Lab Results:  I have personally reviewed pertinent labs  Results from last 7 days  Lab Units 04/26/18  0505 04/24/18  0606 04/23/18  2118 04/23/18  1132   SODIUM mmol/L 142 141 139 139   POTASSIUM mmol/L 3 5 3 8 3 8 3 8   CHLORIDE mmol/L 107 105 103 103   CO2 mmol/L 27 26 26 29   ANION GAP mmol/L 8 10 10 7   BUN mg/dL 22 15 18 19   CREATININE mg/dL 0 91 1 10 1 18 1 29   EGFR ml/min/1 73sq m 89 71 65 59   GLUCOSE RANDOM mg/dL 102 118 126 124   CALCIUM mg/dL 8 6 8 7 8 4 8 7   AST U/L 23  --  26 22   ALT U/L 43  --  42 39   ALK PHOS U/L 44*  --  44* 52   TOTAL PROTEIN g/dL 5 7*  --  5 8* 6 5   BILIRUBIN TOTAL mg/dL 0 74  --  0 91 1 20*       Results from last 7 days  Lab Units 04/26/18  0504 04/24/18  0606 04/23/18  2118   WBC Thousand/uL 1 75* 1 83* 2 44*   HEMOGLOBIN g/dL 13 2 13 1 13 3   PLATELETS Thousands/uL 37* 27* 26*       Results from last 7 days  Lab Units 04/23/18  2318 04/23/18  1132   BLOOD CULTURE   --  Listeria monocytogenes*  No Growth at 48 hrs  GRAM STAIN RESULT   --  Gram positive rods   INFLUENZA A PCR  None Detected  --    INFLUENZA B PCR  None Detected  --    RSV PCR  None Detected  --        Imaging Studies:   I have personally reviewed pertinent imaging study reports and images in PACS  EKG, Pathology, and Other Studies:   I have personally reviewed pertinent reports

## 2018-04-26 NOTE — PROGRESS NOTES
Progress Note Boogie Morejon 4/28/1843, 60 y o  male MRN: [id number]    Unit/Bed#: [facility] 218-02 Encounter: [id number]    Primary Care Provider: Richard Park MD   Date and time admitted to hospital: 4/23/2018  7:12 PM    Listeria bacteremia   Assessment & Plan    Appreciate Infectious Disease recommendations, start ampicillin for total 21 days        Thrombocytopenia (HCC)   Assessment & Plan    Chronic secondary to chemo/CLL        Colitis, acute   Assessment & Plan    Secondary to listeria  Symptoms have resolved        Gastroesophageal reflux disease without esophagitis   Assessment & Plan    Continue pantoprazole        Hyperlipidemia   Assessment & Plan    Continue fenofibrate        CLL (chronic lymphocytic leukemia) (HCC)   Assessment & Plan    Follow-up with Hematology post discharge  Continue prednisone        * Fever   Assessment & Plan    RESOLVED -- secondary to listeria          VTE Pharmacologic Prophylaxis: Pharmacologic VTE Prophylaxis contraindicated due to thrombocytopenia    Patient Centered Rounds: I have performed bedside rounds with nursing staff today  Discussions with Specialists or Other Care Team Provider:  Discussed with case management to set up home infusion ampicillin    Education and Discussions with Family / Patient:     Time Spent for Care: 30 mins  More than 50% of total time spent on counseling and coordination of care as described above  Current Length of Stay: 2 day(s)    Current Patient Status: Inpatient   Certification Statement: The patient will continue to require additional inpatient hospital stay due to bacteremia    Discharge Plan / Estimated Discharge Date: possibly tomorrow    Code Status: Level 1 - Full Code  ______________________________________________________________________________    Subjective:   Patient seen and examined    No new complaints, no chest pain shortness of breath diarrhea    Objective:   Vitals: Blood pressure 151/71, pulse 70, temperature 97 9 °F (36 6 °C), temperature source Temporal, resp  rate 20, SpO2 99 %      Physical Exam:   General appearance: alert, appears stated age and cooperative  Head: Normocephalic, without obvious abnormality, atraumatic  Lungs: clear to auscultation bilaterally  Heart: regular rate and rhythm  Abdomen: soft, non-tender, positive bowel sounds   Back: negative  Extremities: extremities atraumatic, no cyanosis or edema  Neurologic: Grossly normal    Additional Data:   Labs:    Results from last 7 days  Lab Units 04/26/18  0504 04/24/18  0606 04/23/18 2118 04/23/18  1132 04/23/18  0832   WBC Thousand/uL 1 75* 1 83* 2 44* 2 99* 4 10*   HEMOGLOBIN g/dL 13 2 13 1 13 3 14 0 13 6   HEMATOCRIT % 39 3 38 8 38 6 41 4 41 9   MCV fL 88 89 88 89 89   PLATELETS Thousands/uL 37* 27* 26* 30* 25*       Results from last 7 days  Lab Units 04/26/18  0505 04/24/18  0606 04/23/18 2118 04/23/18  1132 04/23/18  0832   SODIUM mmol/L 142 141 139 139 143   POTASSIUM mmol/L 3 5 3 8 3 8 3 8 3 9   CHLORIDE mmol/L 107 105 103 103 107   CO2 mmol/L 27 26 26 29 26   ANION GAP mmol/L 8 10 10 7 10   BUN mg/dL 22 15 18 19 17   CREATININE mg/dL 0 91 1 10 1 18 1 29 1 30   CALCIUM mg/dL 8 6 8 7 8 4 8 7 8 7   BILIRUBIN TOTAL mg/dL 0 74  --  0 91 1 20* 1 20*   ALK PHOS U/L 44*  --  44* 52 52   ALT U/L 43  --  42 39 32   AST U/L 23  --  26 22 29   EGFR ml/min/1 73sq m 89 71 65 59 58   GLUCOSE RANDOM mg/dL 102 118 126 124 132           Results from last 7 days  Lab Units 04/23/18  1132   TROPONIN I ng/mL <0 02            Results from last 7 days  Lab Units 04/23/18 2118 04/23/18  1132   LACTIC ACID mmol/L 0 7 1 1       Results from last 7 days  Lab Units 04/26/18  1119 04/26/18  0747 04/25/18  2111 04/25/18  1738 04/25/18  1616 04/25/18  1135 04/25/18  0757 04/24/18  2116 04/24/18  1708 04/24/18  1535 04/24/18  1105 04/24/18  0759   POC GLUCOSE mg/dl 108 116 139 143* 197* 137 132 224* 217* 240* 116 109             * I Have Reviewed All Lab Data Listed Above  Cultures:     Results from last 7 days  Lab Units 04/23/18  2318 04/23/18  1132   BLOOD CULTURE   --  Listeria monocytogenes*  No Growth at 48 hrs     GRAM STAIN RESULT   --  Gram positive rods   INFLUENZA A PCR  None Detected  --    INFLUENZA B PCR  None Detected  --    RSV PCR  None Detected  --      Scheduled Meds:  Current Facility-Administered Medications:  acetaminophen 650 mg Oral Q6H PRN PATRICK Gonzalez-FREDDY    acyclovir 400 mg Oral Q12H [address] Faiza Alba PA-C    albuterol 2 puff Inhalation 4x Daily PRN PATRICK Gonzalez-FREDDY    aluminum-magnesium hydroxide-simethicone 30 mL Oral Q6H PRN PATRICK Gonzalez-FREDDY    ampicillin 2,000 mg Intravenous Q4H Aurelia Kessler DO Last Rate: Stopped (04/26/18 1213)   aspirin 81 mg Oral Every Other Day Faiza Alba PA-C    citalopram 40 mg Oral Daily PATRICK Gonzalez-FREDDY    fenofibrate 144 mg Oral Daily PATRICK Gonzalez-C    folic acid 1 mg Oral Daily PATRICK Gonzalez-C    gabapentin 600 mg Oral BID PATRICK Gonzalez-C    insulin lispro 1-5 Units Subcutaneous HS PATRICK Gonzalez-C    insulin lispro 1-6 Units Subcutaneous TID AC Faiza Alba PA-C    morphine injection 2 mg Intravenous Q4H PRN PATRICK Gonzalez-FREDDY    ondansetron 4 mg Intravenous Q6H PRN PATRICK Gonzalez-C    pantoprazole 40 mg Oral Early Morning PATRICK Gonzalez-C    predniSONE 10 mg Oral Daily PATRICK Gonzalez-C    sodium chloride 20 mL/hr Intravenous Continuous Robert Rodríguez DO Last Rate: 20 mL/hr (04/25/18 1739)     Continuous Infusions:  sodium chloride 20 mL/hr Last Rate: 20 mL/hr (04/25/18 1739)     PRN Meds:    acetaminophen    albuterol    aluminum-magnesium hydroxide-simethicone    morphine injection    ondansetron     Kiara Bridges DO

## 2018-04-27 ENCOUNTER — APPOINTMENT (INPATIENT)
Dept: NON INVASIVE DIAGNOSTICS | Facility: HOSPITAL | Age: 63
DRG: 868 | End: 2018-04-27
Payer: MEDICARE

## 2018-04-27 VITALS
RESPIRATION RATE: 19 BRPM | OXYGEN SATURATION: 97 % | SYSTOLIC BLOOD PRESSURE: 152 MMHG | HEART RATE: 81 BPM | DIASTOLIC BLOOD PRESSURE: 88 MMHG | TEMPERATURE: 97.1 F

## 2018-04-27 PROBLEM — E11.9 DIABETES MELLITUS (HCC): Status: ACTIVE | Noted: 2018-04-27

## 2018-04-27 PROBLEM — R50.9 FEVER: Status: RESOLVED | Noted: 2017-10-08 | Resolved: 2018-04-27

## 2018-04-27 LAB
GLUCOSE SERPL-MCNC: 102 MG/DL (ref 65–140)
GLUCOSE SERPL-MCNC: 95 MG/DL (ref 65–140)

## 2018-04-27 PROCEDURE — 99232 SBSQ HOSP IP/OBS MODERATE 35: CPT | Performed by: INTERNAL MEDICINE

## 2018-04-27 PROCEDURE — 93306 TTE W/DOPPLER COMPLETE: CPT

## 2018-04-27 PROCEDURE — 82948 REAGENT STRIP/BLOOD GLUCOSE: CPT

## 2018-04-27 PROCEDURE — 93306 TTE W/DOPPLER COMPLETE: CPT | Performed by: INTERNAL MEDICINE

## 2018-04-27 PROCEDURE — 99239 HOSP IP/OBS DSCHRG MGMT >30: CPT | Performed by: INTERNAL MEDICINE

## 2018-04-27 RX ORDER — 0.9 % SODIUM CHLORIDE 0.9 %
10 VIAL (ML) INJECTION SEE ADMIN INSTRUCTIONS
Refills: 0
Start: 2018-04-27

## 2018-04-27 RX ORDER — GABAPENTIN 600 MG/1
600 TABLET ORAL DAILY
COMMUNITY
End: 2019-01-21 | Stop reason: SDUPTHER

## 2018-04-27 RX ORDER — 0.9 % SODIUM CHLORIDE 0.9 %
10 VIAL (ML) INJECTION SEE ADMIN INSTRUCTIONS
Status: DISCONTINUED | OUTPATIENT
Start: 2018-04-27 | End: 2018-04-27 | Stop reason: HOSPADM

## 2018-04-27 RX ADMIN — AMPICILLIN SODIUM 2000 MG: 2 INJECTION, POWDER, FOR SOLUTION INTRAMUSCULAR; INTRAVENOUS at 14:31

## 2018-04-27 RX ADMIN — FOLIC ACID 1 MG: 1 TABLET ORAL at 08:51

## 2018-04-27 RX ADMIN — PANTOPRAZOLE SODIUM 40 MG: 40 TABLET, DELAYED RELEASE ORAL at 06:26

## 2018-04-27 RX ADMIN — AMPICILLIN SODIUM 2000 MG: 2 INJECTION, POWDER, FOR SOLUTION INTRAMUSCULAR; INTRAVENOUS at 10:55

## 2018-04-27 RX ADMIN — AMPICILLIN SODIUM 2000 MG: 2 INJECTION, POWDER, FOR SOLUTION INTRAMUSCULAR; INTRAVENOUS at 03:17

## 2018-04-27 RX ADMIN — FENOFIBRATE 144 MG: 48 TABLET, FILM COATED ORAL at 08:51

## 2018-04-27 RX ADMIN — PREDNISONE 10 MG: 5 TABLET ORAL at 08:51

## 2018-04-27 RX ADMIN — GABAPENTIN 600 MG: 300 CAPSULE ORAL at 08:52

## 2018-04-27 RX ADMIN — AMPICILLIN SODIUM 2000 MG: 2 INJECTION, POWDER, FOR SOLUTION INTRAMUSCULAR; INTRAVENOUS at 06:26

## 2018-04-27 RX ADMIN — ACYCLOVIR 400 MG: 200 CAPSULE ORAL at 08:50

## 2018-04-27 RX ADMIN — CITALOPRAM HYDROBROMIDE 40 MG: 20 TABLET ORAL at 08:51

## 2018-04-27 NOTE — SOCIAL WORK
CM had to send the referral for IV ABX and VNA services to [facility] due to [facility] not able to take patient's insurance because he is not home bound, which is a Medicare requirement, nor could [facility] provide the ABX because they didn't take his insurance  Sent referral to [facility]  Spoke to [employer] [facility] the [place] liaison, and she arranged contracted services for nursing to go out to patient's home for the 6:45 dose omi Leroy will deliver the ABX and pump to patient's home this afternoon so it is there when patient arrives home after discharge  Patient's doses are Q4, requiring a CAD pump to administer the ABX  Met with patient at bedside and updated  Called wife, Hiro Olmos, and explained as well  She will arrange someone to be home to receive the medication from [facility]  She will also come to  patient at 3:00, so he will be home by 6:45 for the nurse  IMM done  Informed: attending, RN, and unit clerk of discharge

## 2018-04-27 NOTE — PROGRESS NOTES
Progress Note Gilberto Erp [birth year], 60 y o  male MRN: [id number]    [facility] 218-02 Encounter: [id number]    Primary Care Provider: Suze Whittaker MD   Date and time admitted to hospital: 4/23/2018  7:12 PM        * Listeria bacteremia   Assessment & Plan    Appreciate Infectious Disease recommendations, start ampicillin ##2/21  Follow-up on echocardiogram        Thrombocytopenia [facility])   Assessment & Plan    Chronic secondary to chemotherapy/CLL        Colitis, acute   Assessment & Plan    Secondary to listeria  Symptoms have resolved        Gastroesophageal reflux disease without esophagitis   Assessment & Plan    Continue pantoprazole        Hyperlipidemia   Assessment & Plan    Continue fenofibrate        CLL (chronic lymphocytic leukemia) ([facility])   Assessment & Plan    Follow-up with Hematology post discharge  Continue prednisone        Fever-resolved as of 4/27/2018   Assessment & Plan    RESOLVED -- secondary to listeria        VTE Pharmacologic Prophylaxis: Pharmacologic VTE Prophylaxis contraindicated due to thrombocytopenia    Patient Centered Rounds: I have performed bedside rounds with nursing staff today  Discussions with Specialists or Other Care Team Provider:  Infectious Disease; Case Management    Education and Discussions with Family / Patient:     Time Spent for Care: 30 mins  More than 50% of total time spent on counseling and coordination of care as described above  Current Length of Stay: 3 day(s)    Current Patient Status: Inpatient   Certification Statement: The patient will continue to require additional inpatient hospital stay due to need for IV antibiotics    Discharge Plan / Estimated Discharge Date:     Code Status: Level 1 - Full Code  ______________________________________________________________________________    Subjective:   Patient seen and examined    No new complaints, no diarrhea    Objective:   Vitals: Blood pressure 152/88, pulse 81, temperature Hellen Pitcher ) 97 1 °F (36 2 °C), temperature source Temporal, resp  rate 19, SpO2 97 %      Physical Exam:   General appearance: alert, appears stated age and cooperative  Head: Normocephalic, without obvious abnormality, atraumatic  Lungs: clear to auscultation bilaterally  Heart: regular rate and rhythm  Abdomen: soft, non-tender, positive bowel sounds   Back: negative  Extremities: extremities atraumatic, no cyanosis or edema  Neurologic: Grossly normal    Additional Data:   Labs:    Results from last 7 days  Lab Units 04/26/18  0504 04/24/18  0606 04/23/18 2118 04/23/18  1132 04/23/18  0832   WBC Thousand/uL 1 75* 1 83* 2 44* 2 99* 4 10*   HEMOGLOBIN g/dL 13 2 13 1 13 3 14 0 13 6   HEMATOCRIT % 39 3 38 8 38 6 41 4 41 9   MCV fL 88 89 88 89 89   PLATELETS Thousands/uL 37* 27* 26* 30* 25*       Results from last 7 days  Lab Units 04/26/18  0505 04/24/18  0606 04/23/18 2118 04/23/18  1132 04/23/18  0832   SODIUM mmol/L 142 141 139 139 143   POTASSIUM mmol/L 3 5 3 8 3 8 3 8 3 9   CHLORIDE mmol/L 107 105 103 103 107   CO2 mmol/L 27 26 26 29 26   ANION GAP mmol/L 8 10 10 7 10   BUN mg/dL 22 15 18 19 17   CREATININE mg/dL 0 91 1 10 1 18 1 29 1 30   CALCIUM mg/dL 8 6 8 7 8 4 8 7 8 7   BILIRUBIN TOTAL mg/dL 0 74  --  0 91 1 20* 1 20*   ALK PHOS U/L 44*  --  44* 52 52   ALT U/L 43  --  42 39 32   AST U/L 23  --  26 22 29   EGFR ml/min/1 73sq m 89 71 65 59 58   GLUCOSE RANDOM mg/dL 102 118 126 124 132           Results from last 7 days  Lab Units 04/23/18  1132   TROPONIN I ng/mL <0 02            Results from last 7 days  Lab Units 04/23/18 2118 04/23/18  1132   LACTIC ACID mmol/L 0 7 1 1       Results from last 7 days  Lab Units 04/27/18  0756 04/26/18  1644 04/26/18  1119 04/26/18  0747 04/25/18  2111 04/25/18  1738 04/25/18  1616 04/25/18  1135 04/25/18  0757 04/24/18  2116 04/24/18  1708 04/24/18  1535   POC GLUCOSE mg/dl 95 159* 108 116 139 143* 197* 137 132 224* 217* 240*             * I Have Reviewed All Lab Data Listed Above     Cultures:     Results from last 7 days  Lab Units 04/23/18  2318 04/23/18  1132   BLOOD CULTURE   --  No Growth at 72 hrs  Listeria monocytogenes*   GRAM STAIN RESULT   --  Gram positive rods   INFLUENZA A PCR  None Detected  --    INFLUENZA B PCR  None Detected  --    RSV PCR  None Detected  --          Imaging:  Imaging Reports Reviewed Today Include:   No results found        Scheduled Meds:  Current Facility-Administered Medications:  acetaminophen 650 mg Oral Q6H PRN Dustin Ashley PA-C    acyclovir 400 mg Oral Q12H [address] Dustin Ashley PA-C    albuterol 2 puff Inhalation 4x Daily PRN Dustin Ashley PA-C    aluminum-magnesium hydroxide-simethicone 30 mL Oral Q6H PRN Dustin Ashley PA-C    ampicillin 2,000 mg Intravenous Q4H Aurelia Kessler DO Last Rate: Stopped (04/27/18 0710)   aspirin 81 mg Oral Every Other Day Dustin Ashley PA-C    citalopram 40 mg Oral Daily Dustin Ashley PA-C    fenofibrate 144 mg Oral Daily Dustin Ashley PA-C    folic acid 1 mg Oral Daily Dustin Ashley PA-C    gabapentin 600 mg Oral BID Dustin Ashley PA-C    insulin lispro 1-5 Units Subcutaneous HS Dustin Ashley PA-C    insulin lispro 1-6 Units Subcutaneous TID AC Dustin Ashley PA-C    morphine injection 2 mg Intravenous Q4H PRN Dustin Ashley PA-C    ondansetron 4 mg Intravenous Q6H PRN Dustin Ashley PA-C    pantoprazole 40 mg Oral Early Morning Dustin Ashley PA-C    predniSONE 10 mg Oral Daily Dustin Ashley PA-C    sodium chloride 20 mL/hr Intravenous Continuous Robert Rodríguez DO Last Rate: 20 mL/hr (04/25/18 1739)     Continuous Infusions:  sodium chloride 20 mL/hr Last Rate: 20 mL/hr (04/25/18 1739)     PRN Meds:    acetaminophen    albuterol    aluminum-magnesium hydroxide-simethicone    morphine injection    ondansetron     Pooja Ross DO

## 2018-04-27 NOTE — PLAN OF CARE
DISCHARGE PLANNING     Discharge to home or other facility with appropriate resources Progressing        DISCHARGE PLANNING - CARE MANAGEMENT     Discharge to post-acute care or home with appropriate resources Progressing        HEMATOLOGIC - ADULT     Maintains hematologic stability Progressing        INFECTION - ADULT     Absence or prevention of progression during hospitalization Progressing     Absence of fever/infection during neutropenic period Progressing        Knowledge Deficit     Patient/family/caregiver demonstrates understanding of disease process, treatment plan, medications, and discharge instructions Progressing        PAIN - ADULT     Verbalizes/displays adequate comfort level or baseline comfort level Progressing        Potential for Falls     Patient will remain free of falls Progressing        SAFETY ADULT     Maintain or return to baseline ADL function Progressing     Maintain or return mobility status to optimal level Progressing

## 2018-04-27 NOTE — PROGRESS NOTES
Progress Note - Infectious Disease   Martha Lane 60 y o  male MRN: [id number]  Unit/Bed#: [place] [facility] 2 -02 Encounter: [id number]      Impression/Recommendations:  28-year-old male with history of CLL on chemotherapy, chronic neutropenia admitted on 04/23 with acute onset fever and 1 episode of vomiting      1   Fever   Patient had a fever of 101 7 at home   No documented fevers here   No actual associated neutropenia  Likely all related to #2  Patient had been on IV cefepime and remained afebrile   Fortunately, patient remains hemodynamically stable, nontoxic   Remains clinically well with no new focal complaints      -  Antibiotic plan as stated below  -  Continue to follow up blood culture data  -monitor temperatures  -monitor hemodynamics     2  Listeria bacteremia   One of 2 blood cultures drawn on admission now positive for listeria monocytogenes  The other set is negative thus far  Consideration that patient had transient bacteremia in the setting of gastroenteritis  Likely obtained from contaminated food  Patient had been on IV cefepime, which does not have appropriate coverage for this organism, and remained clinically improved with no recurrence of fevers  Hemodynamics remain stable  No clinical signs suggestive of meningitis  Due to the risk of recurrence of infection, especially in an immunocompromised patient, would treat this infection with a course of IV antibiotic  Recommended treatment is IV ampicillin  We could also add IV gentamicin, but since patient has already clinically improved with no signs of meningitis, I think the risks with prolonged gentamicin use (including renal, hearing, vestibular dysfunction) outweigh the benefits in this case  I expressed all of this to the patient and he is in agreement with this plan  Patient now on IV ampicillin and tolerating it without difficulty  He will be going home and receiving his antibiotic through his port    I did express to him the risk of possible port infection as he will be accessing it is frequently, and he states he is aware of this risk      - continue with high-dose ampicillin 2 g IV q 4 hours  Tentative plan for 3 weeks, through   Check weekly CBC with diff, CMP while on IV antibiotic  Patient will follow-up with infectious diseases as outpatient  - follow up final blood cultures     3   Mild colitis   Seen on CT abdomen/pelvis   May be the cause for #1 and #2  Nausea and vomiting has improved with no subsequent events   No abdominal pain   Abdominal exam is benign  Brianda Mohr does have loose bowel movements, but states this has been an ongoing issue  Currently not having loose bowel movements  Tolerating oral diet      -serial abdominal exams  -monitor symptoms closely     4   Prior history of HSV esophagitis   Patient currently on oral acyclovir prophylactic dose      5   Thrombocytopenia   Most likely secondary to chemotherapy   Platelet count stable   Hematology/oncology evaluation noted   Continue to monitor closely      6   CLL on chemotherapy   Patient is currently leukopenic, not neutropenic  Ibrutinib currently on hold by Oncology in the setting of possible infection   Will hopefully be able to re-initiate soon      Antibiotics:   cefepime D4   antibiotic D5     Stable for discharge from ID standpoint  Spoke with patient in detail regarding plan of care  Spoke with primary team regarding plan of care  Subjective:  Patient going home today  He will get his antibiotic through his port  He feels well  No new complaints  Denies fevers, chills, or sweats  Denies nausea, vomiting, or diarrhea  No shortness of breath or cough      Objective:  Vitals:  HR:  [76-81] 81  Resp:  [19-20] 19  BP: (152-155)/(82-88) 152/88  SpO2:  [97 %-98 %] 97 %  Temp (24hrs), Av 5 °F (36 4 °C), Min:97 1 °F (36 2 °C), Max:97 8 °F (36 6 °C)  Current: Temperature: (!) 97 1 °F (36 2 °C)    Physical Exam:   General:  No acute distress  Psychiatric: Awake and alert  Pulmonary:  Normal respiratory excursion without accessory muscle use  Abdomen:  Soft, nontender  Extremities:  No edema  Skin:  No rashes  Right chest wall port with no surrounding erythema or tenderness    Lab Results:  I have personally reviewed pertinent labs  Results from last 7 days  Lab Units 04/26/18  0505 04/24/18  0606 04/23/18  2118 04/23/18  1132   SODIUM mmol/L 142 141 139 139   POTASSIUM mmol/L 3 5 3 8 3 8 3 8   CHLORIDE mmol/L 107 105 103 103   CO2 mmol/L 27 26 26 29   ANION GAP mmol/L 8 10 10 7   BUN mg/dL 22 15 18 19   CREATININE mg/dL 0 91 1 10 1 18 1 29   EGFR ml/min/1 73sq m 89 71 65 59   GLUCOSE RANDOM mg/dL 102 118 126 124   CALCIUM mg/dL 8 6 8 7 8 4 8 7   AST U/L 23  --  26 22   ALT U/L 43  --  42 39   ALK PHOS U/L 44*  --  44* 52   TOTAL PROTEIN g/dL 5 7*  --  5 8* 6 5   BILIRUBIN TOTAL mg/dL 0 74  --  0 91 1 20*       Results from last 7 days  Lab Units 04/26/18  0504 04/24/18  0606 04/23/18  2118   WBC Thousand/uL 1 75* 1 83* 2 44*   HEMOGLOBIN g/dL 13 2 13 1 13 3   PLATELETS Thousands/uL 37* 27* 26*       Results from last 7 days  Lab Units 04/23/18  2318 04/23/18  1132   BLOOD CULTURE   --  No Growth at 72 hrs  Listeria monocytogenes*   GRAM STAIN RESULT   --  Gram positive rods   INFLUENZA A PCR  None Detected  --    INFLUENZA B PCR  None Detected  --    RSV PCR  None Detected  --        Imaging Studies:   I have personally reviewed pertinent imaging study reports and images in PACS  EKG, Pathology, and Other Studies:   I have personally reviewed pertinent reports

## 2018-04-27 NOTE — DISCHARGE SUMMARY
Discharge- Yanna Miles [birth year], 60 y o  male MRN: [id number]    Unit/Bed#: [place] [address] [place] [address] 218-02 Encounter: [id number]    Primary Care Provider: Solis Colbert MD   Date and time admitted to hospital: 4/23/2018  7:12 PM    DISCHARGE DIAGNOSES  * Listeria bacteremia   Assessment & Plan    Appreciate Infectious Disease recommendations, ampicillin ##2/21  Echocardiogram with aortic valve sclerosis but no vegetation  Patient arrange for home infusion until 5/16/2018  VNA to teach and change port needle per protocol        Diabetes mellitus ([facility] [address] )   Assessment & Plan    Continue glargine and lispro        Thrombocytopenia (HCC)   Assessment & Plan    Chronic secondary to chemotherapy/CLL        Colitis, acute   Assessment & Plan    Secondary to listeria  Symptoms have resolved        Gastroesophageal reflux disease without esophagitis   Assessment & Plan    Continue pantoprazole        Hyperlipidemia   Assessment & Plan    Continue fenofibrate        CLL (chronic lymphocytic leukemia) (HCC)   Assessment & Plan    Follow-up with Hematology post discharge  Continue prednisone        Admitting Provider:  Genny Lamas DO  Discharge Provider:  Genny Lamas DO  Admission Date: 4/23/2018       Discharge Date: 04/27/18   LOS: 3  Primary Care Physician at Discharge: Solis Colbert MD [address]:  Yanna Miles is a 60 y o  male who presented to the hospital with fever and diarrhea  Patient is immunosuppressed with CLL and on chemotherapy  During hospitalization he was initially started on cefepime  His fevers quickly subsided and the patient did not have any further diarrhea  One of two blood cultures eventually positive for listeria  Infectious Disease was consulted during hospitalization and eventually transitioned to ampicillin  Patient will need 21 days of IV ampicillin via chest port as patient declined to have PICC and rather utilize port    At time of discharge is clinically stable  VNA to follow-up at home for initial teaching and changing of access needle every five days or per protocol while port is accessed  He is instructed to flush his port with 10 mL normal saline before and after each ampicillin dose  Ova [address] Marne [address] Dr Leopold Coma  Xr Chest 2 Views  Result Date: 4/23/2018  Impression: No acute cardiopulmonary disease  Workstation performed: [id number]     Xr Shoulder 2+ Vw Left  Result Date: 4/17/2018  Impression: 1  No acute osseous abnormality  2   Degenerative changes as described  Workstation performed: [id number]     Xr Shoulder 2+ Vw Right  Result Date: 4/17/2018  Impression: No acute osseous abnormality  Workstation performed: [id number]     Ct Abdomen Pelvis With Contrast  Result Date: 4/23/2018  Impression: 1  Splenomegaly measuring 17 5 cm in craniocaudal dimension  No focal splenic lesion  2   Cholelithiasis without other evidence of acute cholecystitis  3   Small hiatal hernia with circumferential thickening of the distal esophagus  Correlate for evidence of reflux esophagitis and consider endoscopy if clinically warranted  4   Mild bowel wall thickening of a portion of the transverse colon may represent peristalsis versus focal colitis  Correlate clinically for evidence of colitis  Workstation performed: [id number]     Echocardiogram  Result Date: 4/23/2018  Impression:  Aortic valve sclerosis without stenosis    LV EF 60%    Other Pertinent Test Results    Results from last 7 days  Lab Units 04/26/18  0504 04/24/18  0606 04/23/18 2118 04/23/18  1132 04/23/18  0832   WBC Thousand/uL 1 75* 1 83* 2 44* 2 99* 4 10*   HEMOGLOBIN g/dL 13 2 13 1 13 3 14 0 13 6   HEMATOCRIT % 39 3 38 8 38 6 41 4 41 9   MCV fL 88 89 88 89 89   PLATELETS Thousands/uL 37* 27* 26* 30* 25*       Results from last 7 days  Lab Units 04/26/18  0505 04/24/18  0606 04/23/18 2118 04/23/18  1132 04/23/18  0832   SODIUM mmol/L 142 141 139 139 143   POTASSIUM mmol/L 3 5 3 8 3 8 3 8 3 9   CHLORIDE mmol/L 107 105 103 103 107   CO2 mmol/L 27 26 26 29 26   ANION GAP mmol/L 8 10 10 7 10   BUN mg/dL 22 15 18 19 17   CREATININE mg/dL 0 91 1 10 1 18 1 29 1 30   CALCIUM mg/dL 8 6 8 7 8 4 8 7 8 7   BILIRUBIN TOTAL mg/dL 0 74  --  0 91 1 20* 1 20*   ALK PHOS U/L 44*  --  44* 52 52   ALT U/L 43  --  42 39 32   AST U/L 23  --  26 22 29   EGFR ml/min/1 73sq m 89 71 65 59 58   GLUCOSE RANDOM mg/dL 102 118 126 124 132       Results from last 7 days  Lab Units 04/23/18  1132   TROPONIN I ng/mL <0 02                Results from last 7 days  Lab Units 04/27/18  1101 04/27/18  0756 04/26/18  1644 04/26/18  1119 04/26/18  0747 04/25/18  2111 04/25/18  1738 04/25/18  1616 04/25/18  1135 04/25/18  0757   POC GLUCOSE mg/dl 102 95 159* 108 116 139 143* 197* 137 132               Results from last 7 days  Lab Units 04/23/18  2118 04/23/18  1132   LACTIC ACID mmol/L 0 7 1 1           Cultures:     Results from last 7 days  Lab Units 04/23/18  2233   COLOR UA  Yellow   CLARITY UA  Clear   SPEC GRAV UA  1 015   PH UA  8 5*   LEUKOCYTES UA  Negative   NITRITE UA  Negative   PROTEIN UA mg/dl Negative   GLUCOSE UA mg/dl Negative   KETONES UA mg/dl Negative   BILIRUBIN UA  Negative   BLOOD UA  Small*        Results from last 7 days  Lab Units 04/23/18  2233   RBC UA /hpf 4-10*   WBC UA /hpf None Seen   EPITHELIAL CELLS WET PREP /hpf Occasional   BACTERIA UA /hpf Occasional   AMORPHOUS PHOSPHATES /hpf Moderate        Results from last 7 days  Lab Units 04/23/18  2318 04/23/18  1132   BLOOD CULTURE   --  No Growth at 72 hrs    Listeria monocytogenes*   GRAM STAIN RESULT   --  Gram positive rods   INFLUENZA A PCR  None Detected  --    INFLUENZA B PCR  None Detected  --    RSV PCR  None Detected  --      Planned Re-admission: No  Discharge Disposition: Home/VNA    Test Results Pending at Discharge:   Incidental findings: None    Medications   Please see After Visit Summary for reconciled discharge medications provided to patient and family  Diet restrictions: Diabetic diet  Activity restrictions: No strenuous activity  Discharge Condition: good    Outpatient Follow-Up  1  Ginny Golden MD [address] [place] [address] [address] [place], [place] [phone] - follow-up within one week  2  Sean Salinas MD hematology Oncology  Follow-up as scheduled  3  Deyanira Anand PA-C infectious Diseases  Given appointment 5/9/2018 1:45 p m  Code Status: Level 1 - Full Code  Discharge Statement   I spent 45 minutes discharging the patient  This time was spent on the day of discharge  Greater than 50% of total time was spent with the patient and / or family counseling and / or coordination of care

## 2018-04-27 NOTE — ASSESSMENT & PLAN NOTE
Appreciate Infectious Disease recommendations, start ampicillin ##2/21    Follow-up on echocardiogram

## 2018-04-27 NOTE — ASSESSMENT & PLAN NOTE
Appreciate Infectious Disease recommendations, ampicillin ##2/21  Echocardiogram with aortic valve sclerosis but no vegetation  Patient arrange for home infusion until 5/16/2018    VNA to teach and change port needle per protocol

## 2018-04-28 LAB — BACTERIA BLD CULT: NORMAL

## 2018-04-30 ENCOUNTER — HOSPITAL ENCOUNTER (OUTPATIENT)
Dept: INFUSION CENTER | Facility: CLINIC | Age: 63
Discharge: HOME | End: 2018-04-30
Payer: MEDICARE

## 2018-04-30 DIAGNOSIS — C91.10 CHRONIC LYMPHOCYTIC LEUKEMIA (HCC): ICD-10-CM

## 2018-04-30 LAB
ALBUMIN SERPL BCP-MCNC: 3.7 G/DL (ref 3.5–5.7)
ALP SERPL-CCNC: 57 U/L (ref 46–116)
ALT SERPL W P-5'-P-CCNC: 37 U/L (ref 12–78)
ANION GAP SERPL CALCULATED.3IONS-SCNC: 9 MMOL/L (ref 4–13)
AST SERPL W P-5'-P-CCNC: 26 U/L (ref 5–45)
BASOPHILS # BLD AUTO: 0 THOUSAND/UL (ref 0–0.1)
BASOPHILS NFR MAR MANUAL: 0 % (ref 0–1)
BILIRUB SERPL-MCNC: 0.6 MG/DL (ref 0.2–1)
BUN SERPL-MCNC: 18 MG/DL (ref 5–25)
CALCIUM SERPL-MCNC: 9.2 MG/DL (ref 8.3–10.1)
CHLORIDE SERPL-SCNC: 109 MMOL/L (ref 98–108)
CO2 SERPL-SCNC: 26 MMOL/L (ref 21–32)
CREAT SERPL-MCNC: 1 MG/DL (ref 0.6–1.3)
EOSINOPHIL # BLD AUTO: 0.07 THOUSAND/UL (ref 0–0.61)
EOSINOPHIL NFR BLD MANUAL: 2 % (ref 0–6)
ERYTHROCYTE [DISTWIDTH] IN BLOOD BY AUTOMATED COUNT: 15 % (ref 11.6–15.1)
GFR SERPL CREATININE-BSD FRML MDRD: 80 ML/MIN/1.73SQ M
GLUCOSE SERPL-MCNC: 107 MG/DL (ref 65–140)
HCT VFR BLD AUTO: 38.3 % (ref 36.5–49.3)
HGB BLD-MCNC: 12.8 G/DL (ref 12–17)
LG PLATELETS BLD QL SMEAR: PRESENT
LYMPHOCYTES # BLD AUTO: 0.54 THOUSAND/UL (ref 0.6–4.47)
LYMPHOCYTES # BLD AUTO: 15 % (ref 14–44)
MCH RBC QN AUTO: 29.7 PG (ref 26.8–34.3)
MCHC RBC AUTO-ENTMCNC: 33.5 G/DL (ref 31.4–37.4)
MCV RBC AUTO: 89 FL (ref 82–98)
MONOCYTES # BLD AUTO: 0.14 THOUSAND/UL (ref 0–1.22)
MONOCYTES NFR BLD AUTO: 4 % (ref 4–12)
NEUTS BAND NFR BLD MANUAL: 0 % (ref 0–8)
NEUTS SEG # BLD: 2.84 THOUSAND/UL (ref 1.81–6.82)
NEUTS SEG NFR BLD AUTO: 79 % (ref 43–75)
OVALOCYTES BLD QL SMEAR: PRESENT
PLATELET # BLD AUTO: 89 THOUSANDS/UL (ref 149–390)
PLATELET BLD QL SMEAR: ABNORMAL
PMV BLD AUTO: 7.8 FL (ref 8.9–12.7)
POTASSIUM SERPL-SCNC: 3.9 MMOL/L (ref 3.5–5.3)
PROT SERPL-MCNC: 5.7 G/DL (ref 6.4–8.2)
RBC # BLD AUTO: 4.31 MILLION/UL (ref 3.88–5.62)
SODIUM SERPL-SCNC: 144 MMOL/L (ref 136–145)
TOTAL CELLS COUNTED SPEC: 100
WBC # BLD AUTO: 3.6 THOUSAND/UL (ref 4.31–10.16)
WBC NRBC COR # BLD: 3.6 THOUSAND/UL (ref 4.31–10.16)

## 2018-04-30 PROCEDURE — 80053 COMPREHEN METABOLIC PANEL: CPT

## 2018-04-30 PROCEDURE — 85027 COMPLETE CBC AUTOMATED: CPT

## 2018-04-30 PROCEDURE — 85007 BL SMEAR W/DIFF WBC COUNT: CPT

## 2018-04-30 NOTE — PLAN OF CARE
Problem: Potential for Falls  Goal: Patient will remain free of falls  INTERVENTIONS:  - Assess patient frequently for physical needs  -  Identify cognitive and physical deficits and behaviors that affect risk of falls    -  [place] fall precautions as indicated by assessment   - Educate patient/family on patient safety including physical limitations  - Instruct patient to call for assistance with activity based on assessment  - Modify environment to reduce risk of injury  - Consider OT/PT consult to assist with strengthening/mobility   Outcome: Progressing

## 2018-04-30 NOTE — PROGRESS NOTES
Pt arrived to unit with PAC accessed and infusing continuous IV antbx via CADD pump  Pt states his port was accessed and IV antbx inititiated on Friday evening by a nurse from [facility]  IV antbx infusion stopped,  CBCD CMP drawn per protocol, line flushed with NSS,  and IV antbx restarted  Pt tolerated well  Pt left unit in stable condition with IV antbx infusing via PAC

## 2018-05-02 ENCOUNTER — OFFICE VISIT (OUTPATIENT)
Dept: HEMATOLOGY ONCOLOGY | Facility: CLINIC | Age: 63
End: 2018-05-02
Payer: MEDICARE

## 2018-05-02 VITALS
OXYGEN SATURATION: 98 % | DIASTOLIC BLOOD PRESSURE: 72 MMHG | HEIGHT: 73 IN | RESPIRATION RATE: 18 BRPM | HEART RATE: 78 BPM | TEMPERATURE: 98.2 F | BODY MASS INDEX: 29.77 KG/M2 | SYSTOLIC BLOOD PRESSURE: 120 MMHG | WEIGHT: 224.6 LBS

## 2018-05-02 DIAGNOSIS — D59.19 OTHER AUTOIMMUNE HEMOLYTIC ANEMIAS: ICD-10-CM

## 2018-05-02 DIAGNOSIS — A32.7: ICD-10-CM

## 2018-05-02 DIAGNOSIS — D75.82 HIT (HEPARIN-INDUCED THROMBOCYTOPENIA) (HCC): ICD-10-CM

## 2018-05-02 DIAGNOSIS — D61.818 PANCYTOPENIA (HCC): ICD-10-CM

## 2018-05-02 DIAGNOSIS — C91.10 CLL (CHRONIC LYMPHOCYTIC LEUKEMIA) (HCC): Primary | ICD-10-CM

## 2018-05-02 PROCEDURE — 99215 OFFICE O/P EST HI 40 MIN: CPT | Performed by: INTERNAL MEDICINE

## 2018-05-02 NOTE — LETTER
May 2, 2018     [address]  [address] [facility] [id number]    Patient: Lorel Severance   YOB: 1955   Date of Visit: 5/2/2018       Dear Dr Jimenez Solorzano: Thank you for referring Anupama Clayton to me for evaluation  Below are my notes for this consultation  If you have questions, please do not hesitate to call me  I look forward to following your patient along with you  Sincerely,        Robert Guerrero MD        CC: No Recipients  Robert Guerrero MD  5/2/2018  8:28 AM  Sign at close encounter    HPI:   Assessment and Plan:  1  CLL (chronic lymphocytic leukemia) (HCC)  - Patient is on Imbruvica 140 mg PO daily and Gazyva 1000 mg/m2 every 4 weeks  - CBC has showed acceptable platelet counts in 30-10 range  Hemoglobin is normal in 13-14 range  Neutrophils remain normal as well  - Plan to continue on the above plan       2  Other autoimmune hemolytic anemias ([address] )  - Patient is on prednisone 10 mg PO daily  He is taking multivitamin  Advise he needs minimum 600 mg calcium and 1000 IU of vitamin D  Written information provided regarding this  - He asked questions in regards to decreased prednisone dose  Discussed that since he has been doing so well recently, would prefer not to change  Also, his blood sugars have been well managed  He only is requiring basal insulin at this time as well  - Retic count was normal on 2/26/18    - Continue prednisone 10 mg PO daily          HPI:  On 3/20/17 patient found to have hemoglobin of 6 2, ,000  He was admitted to [address] [address] for blood transfusion and plan to transfer to [address] [place] [address]  On 3/20/17 WBC count 195,000, hemoglobin 4 9, platelet count 65  Direct coomb's was positive with undetectable haptoglobin  He was given 2 units of PRBCs, Solu-Medrol 1 g ×3 days and IVIG 1 g/kg daily ×3 days  His hemoglobin continued to drop   Bone marrow biopsy on 3/21 showed marrow cellularity of greater than 95% almost replaced by small lymphocytes, lambda light chain restricted, CD20 positive, CD19 positive, CD23 positive partially expressing CD11c and CD25 and CD5 positive and negative for CD 79 and CD38  He was treated with single dose of chlorambucil to control his CLL   3/22 second dose of chlorambucil, also Rituxan 375 mg/M ² autoimmune hemolytic anemia  WBC continued to rise, 266,000  Patient initiated with Venetoclax 20 mg daily  Tumor lysis monitored every 8 hours; given aggressive hydration and Lasix and remained euvolemic  3/24WBC dropped to 112,000  3/25- WBC 63,000  3/26WBC 15,000, , platelet count 88,211  Patient became febrile, 101 3  The cefepime initiated Venetoclax held  3/28patient fell from bed, CT head negative  ANC 1200, cefepime discontinued and Levaquin 75 mg daily started sliding 3/29 given second dose of rituximab 375 mg/m ²   3/30WBC meg to 14 5 thousand, platelets 27,310, Venetoclax restarted 10 mg every other day  3/31 WBC 4 4, , platelet count 78,500  4/14/4: WBC maintained less than 10,000, ANC and platelet count meg  He was discharged on Venetoclax 10 mg every other day  He was instructed to discontinue simvastatin, Ranexa, aspirin, metoprolol 50 mg q  day, losartan 50 mg q  day, Plavix 75 mg q  day, folic acid 291 µg b i d , prednisone 60 mg, allopurinol  He was advised to continue Levaquin 750 mg daily for 10 days, Lexapro 10 mg daily, fenofibrate 50 mg daily, gabapentin 100 mg twice daily, Protonix 40 mg daily     Imaging studies performed at [facility]:  Patient had echocardiogram while inpatient at [facility] on 3/21  This study was unremarkable  CT chest abdomen pelvis without contrast on 3/24 showed stable pulmonary nodules, patchy groundglass densities of lower lobes previously identified and which may represent volume loss or early infiltrate  Persistent diffuse bronchial wall thickening suggesting acute/chronic bronchitis changes   No bronchiectasis  No masses  Stable lymphadenopathy of mediastinum  Stable axillary lymphadenopathy  Splenomegaly 23 9 cm  Extensive lymphadenopathy throughout the entire retroperitoneal, small bowel mesentery, and pelvis  Largest lymph node in right lower quadrant measured 4 6 x 4 8  Stable enlarged left para-aortic lymph node measuring 6 2 x 6 8   4/12/17: Patient received one dose of Rituxan on 4/7/17  Venetoclax 10 mg every other day 4/5/17 - 4/9/17  Venetoclax 10 mg every day beginning 4/10/17  Monitoring CBC 3 times per week  4/28/17: patient had follow-up appointment at [facility] with Dr David Costa  She recommended to slowly increase dose of veneteclax  Also, she recommended as he has had numerous treatments with Rituxan, if antibody directed therapy becomes indicated in the future recommendation was with [facility]  She will be seeing her on a p r n  Basis      July 2017- Hemolysis  Disease not under control with veneteclax  Started prednisone 60 mg daily, folic acid, IBRUTINIB 049 mg daily and Gazyva       Sept 2017- 9/18-9/20 patient was admitted due to uncontrolled hyperglycemia with new onset DM type II due to chronic prednisone use for CLL/hemolytic anemia; also found to have profound neutropenia   Ibrutinib dose was reduced to 280 mg daily      October 2017- 10/7/17 - 10/14/17 patient was admitted due to cellulitis on his scalp     Dec 2017- Leeta Fray decreased to 140 mg PO daily due to thrombocytopenia            Current Outpatient Prescriptions:     acyclovir (ZOVIRAX) 400 MG tablet, Take 400 mg by mouth 2 (two) times a day, Disp: , Rfl:     ampicillin 2,000 mg in sodium chloride 0 9 % 100 mL IVPB, Infuse 2,000 mg into a venous catheter every 4 (four) hours for 20 days, Disp: 51050 mL, Rfl: 0    aspirin 81 MG tablet, Take 81 mg by mouth every other day Every other day , Disp: , Rfl:     citalopram (CeleXA) 40 mg tablet, Take 40 mg by mouth daily, Disp: , Rfl:     fenofibrate (TRIGLIDE) 50 MG tablet, Take 134 mg by mouth daily  , Disp: , Rfl:     folic acid (FOLVITE) 1 mg tablet, Take 1 mg by mouth daily, Disp: , Rfl:     gabapentin (NEURONTIN) 600 MG tablet, Take 600 mg by mouth 2 (two) times a day  , Disp: , Rfl:     glucose monitoring kit (FREESTYLE) monitoring kit, 1 each by Does not apply route as needed ( ) E 11 9, Disp: 1 each, Rfl: 0    insulin glargine (LANTUS) 100 units/mL subcutaneous injection, Inject 15 Units under the skin every morning (Patient taking differently: Inject 12 Units under the skin every morning  ), Disp: 10 mL, Rfl: 0    insulin lispro (HumaLOG) 100 units/mL injection, Inject 10 Units under the skin 3 (three) times a day with meals, Disp: , Rfl: 0    Lancets (FREESTYLE) lancets, Use as instructed--test 2 times a day  E 11 9, Disp: 100 each, Rfl: 0    multivitamin (THERAGRAN) TABS, Take 1 tablet by mouth daily, Disp: , Rfl:     obinutuzumab (GAZYVA) 1000 mg/40 mL SOLN, Infuse into a venous catheter, Disp: , Rfl:     pantoprazole (PROTONIX) 40 mg tablet, Take 40 mg by mouth daily  , Disp: , Rfl:     predniSONE 10 mg tablet, Take 10 mg by mouth daily, Disp: , Rfl:     sodium chloride, PF, 0 9 %, 10 mL by Intracatheter route see administration instructions 10mL into port before and after ampicillin doses, Disp: , Rfl: 0    VENTOLIN  (90 Base) MCG/ACT inhaler, INHALE 2 PUFFS 4 TIMES A DAY AS NEEDED, Disp: , Rfl: 3    acetaminophen (TYLENOL) 325 mg tablet, Take 2 tablets by mouth every 6 (six) hours as needed for fever (fever > 101 4), Disp: 60 tablet, Rfl: 0    Ibrutinib 140 MG CAPS, Take 1 capsule by mouth daily, Disp: 30 capsule, Rfl: 1    Allergies   Allergen Reactions    Heparin Other (See Comments)     Other reaction(s): Blood Disorders  clot    Macrolides And Ketolides Other (See Comments)     Interacts with other meds he is taking       ROS:  05/02/18 Reviewed 13 systems:  Presently no headaches, seizures, dizziness, diplopia, dysphagia, hoarseness, chest pain, palpitations, shortness of breath, cough, hemoptysis, abdominal pain, nausea, vomiting, change in bowel habits, melena, hematuria, fever, chills, bleeding, bone pains, skin rash, weight loss, arthritic symptoms, tiredness ,  weakness, numbness,  claudication and gait problem  No frequent infections  Not unusually sensitive to heat or cold  No swelling of the ankles  No swollen glands  Patient is anxious  Other symptoms are in HPI        /72   Pulse 78   Temp 98 2 °F (36 8 °C) (Tympanic)   Resp 18   Ht 6' 1" (1 854 m)   Wt 102 kg (224 lb 9 6 oz)   SpO2 98%   BMI 29 63 kg/m²      Physical Exam:  Alert, oriented, not in distress, no icterus, no oral thrush, no palpable neck mass, clear lung fields, regular heart rate, abdomen  soft and non tender, no palpable abdominal mass, no ascites, no edema of ankles, no calf tenderness, no focal neurological deficit, no skin rash, no palpable lymphadenopathy, good arterial pulses, no clubbing  Patient is anxious  Performance status 1      IMAGING:  No orders to display       LABS:  Results for orders placed or performed during the hospital encounter of 04/30/18   Comprehensive metabolic panel   Result Value Ref Range    Sodium 144 136 - 145 mmol/L    Potassium 3 9 3 5 - 5 3 mmol/L    Chloride 109 (H) 98 - 108 mmol/L    CO2 26 21 - 32 mmol/L    Anion Gap 9 4 - 13 mmol/L    BUN 18 5 - 25 mg/dL    Creatinine 1 00 0 60 - 1 30 mg/dL    Glucose 107 65 - 140 mg/dL    Calcium 9 2 8 3 - 10 1 mg/dL    AST 26 5 - 45 U/L    ALT 37 12 - 78 U/L    Alkaline Phosphatase 57 46 - 116 U/L    Total Protein 5 7 (L) 6 4 - 8 2 g/dL    Albumin 3 7 3 5 - 5 7 g/dL    Total Bilirubin 0 60 0 20 - 1 00 mg/dL    eGFR 80 ml/min/1 73sq m   CBC and differential   Result Value Ref Range    WBC 3 60 (L) 4 31 - 10 16 Thousand/uL    Adjusted WBC 3 60 (L) 4 31 - 10 16 Thousand/uL    RBC 4 31 3 88 - 5 62 Million/uL    Hemoglobin 12 8 12 0 - 17 0 g/dL    Hematocrit 38 3 36 5 - 49 3 %    MCV 89 82 - 98 fL    MCH 29 7 26 8 - 34 3 pg    MCHC 33 5 31 4 - 37 4 g/dL    RDW 15 0 11 6 - 15 1 %    MPV 7 8 (L) 8 9 - 12 7 fL    Platelets 89 (L) 907 - 390 Thousands/uL   Manual Differential (Non Wam)   Result Value Ref Range    Segmented % 79 (H) 43 - 75 %    Bands % 0 0 - 8 %    Lymphocytes % 15 14 - 44 %    Monocytes % 4 4 - 12 %    Eosinophils % 2 0 - 6 %    Basophils % 0 0 - 1 %    Neutrophils Absolute 2 84 1 81 - 6 82 Thousand/uL    Lymphocytes Absolute 0 54 (L) 0 60 - 4 47 Thousand/uL    Monocytes Absolute 0 14 0 00 - 1 22 Thousand/uL    Eosinophils Absolute 0 07 0 00 - 0 61 Thousand/uL    Basophils Absolute 0 00 0 00 - 0 10 Thousand/uL    Total Counted 100     Ovalocytes Present     Platelet Estimate Decreased (A) Adequate    Large Platelet Present      Labs, Imaging, & Other studies:   All pertinent labs and imaging studies were personally reviewed    Lab Results   Component Value Date     04/30/2018    K 3 9 04/30/2018     (H) 04/30/2018    CO2 26 04/30/2018    ANIONGAP 9 04/30/2018    BUN 18 04/30/2018    CREATININE 1 00 04/30/2018    GLUCOSE 107 04/30/2018    GLUF 118 (H) 04/24/2018    CALCIUM 9 2 04/30/2018    AST 26 04/30/2018    ALT 37 04/30/2018    ALKPHOS 57 04/30/2018    PROT 5 7 (L) 04/30/2018    BILITOT 0 60 04/30/2018    EGFR 80 04/30/2018     Lab Results   Component Value Date    WBC 3 60 (L) 04/30/2018    HGB 12 8 04/30/2018    HCT 38 3 04/30/2018    MCV 89 04/30/2018    PLT 89 (L) 04/30/2018       Reviewed and discussed with patient  Assessment and plan:  Manjinder Santo is a 60 y o  male with           Patient voiced understanding and agreement in the discussion  Counseling / Coordination of Care   Greater than 50% of total time was spent with the patient and / or family counseling and / or coordination of care

## 2018-05-02 NOTE — PROGRESS NOTES
Office visit  HPI:   Patient was in the hospital recently with the listeria and he is still on intravenous antibiotics and he tells me that he will be on it for 2 more weeks  I reviewed hospital records  He had positive blood cultures  He was hospitalized with fever chills and vomiting  No more fevers  No more chills  No more vomiting  He has chronic cough and exertional dyspnea  He has tiredness  He has vascular purpura on forearms as before  He is taking prednisone 10 mg daily and folic acid 1 mg daily but ibrutinib and gazyva  are on hold  There is improvement in his blood counts  Assessment and Plan:  1  CLL (chronic lymphocytic leukemia) ([facility])  - Patient is on Imbruvica 140 mg PO daily and Gazyva 1000 mg/m2 every 4 weeks     On hold now   - CBC has shown improvement in blood counts    2  Other autoimmune hemolytic anemias (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     HPI:  On 3/20/17 patient found to have hemoglobin of 6 2, ,000  He was admitted to [place] [address] for blood transfusion and plan to transfer to [address] [place] [address]  On 3/20/17 WBC count 195,000, hemoglobin 4 9, platelet count 65  Direct coomb's was positive with undetectable haptoglobin  He was given 2 units of PRBCs, Solu-Medrol 1 g ×3 days and IVIG 1 g/kg daily ×3 days  His hemoglobin continued to drop  Bone marrow biopsy on 3/21 showed marrow cellularity of greater than 95% almost replaced by small lymphocytes, lambda light chain restricted, CD20 positive, CD19 positive, CD23 positive partially expressing CD11c and CD25 and CD5 positive and negative for CD 79 and CD38  He was treated with single dose of chlorambucil to control his CLL   3/22 second dose of chlorambucil, also Rituxan 375 mg/M ² autoimmune hemolytic anemia  WBC continued to rise, 266,000  Patient initiated with Venetoclax 20 mg daily  Tumor lysis monitored every 8 hours; given aggressive hydration and Lasix and remained euvolemic  3/24-WBC dropped to 112,000  3/25- WBC 63,000  3/26-WBC 15,000, , platelet count 36,117  Patient became febrile, 101 3  The cefepime initiated Venetoclax held  3/28-patient fell from bed, CT head negative  ANC 1200, cefepime discontinued and Levaquin 75 mg daily started sliding 3/29 given second dose of rituximab 375 mg/m ²   3/30-WBC meg to 14 5 thousand, platelets 46,160, Venetoclax restarted 10 mg every other day  3/31 WBC 4 4, , platelet count 98,069  4/1-4/4: WBC maintained less than 10,000, ANC and platelet count meg  He was discharged on Venetoclax 10 mg every other day  He was instructed to discontinue simvastatin, Ranexa, aspirin, metoprolol 50 mg q  day, losartan 50 mg q  day, Plavix 75 mg q  day, folic acid 722 µg b i d , prednisone 60 mg, allopurinol  He was advised to continue Levaquin 750 mg daily for 10 days, Lexapro 10 mg daily, fenofibrate 50 mg daily, gabapentin 100 mg twice daily, Protonix 40 mg daily     Imaging studies performed at [facility]:  Patient had echocardiogram while inpatient at [facility] on 3/21  This study was unremarkable  CT chest abdomen pelvis without contrast on 3/24 showed stable pulmonary nodules, patchy groundglass densities of lower lobes previously identified and which may represent volume loss or early infiltrate  Persistent diffuse bronchial wall thickening suggesting acute/chronic bronchitis changes  No bronchiectasis  No masses  Stable lymphadenopathy of mediastinum  Stable axillary lymphadenopathy  Splenomegaly 23 9 cm  Extensive lymphadenopathy throughout the entire retroperitoneal, small bowel mesentery, and pelvis  Largest lymph node in right lower quadrant measured 4 6 x 4 8   Stable enlarged left para-aortic lymph node measuring 6 2 x 6 8   4/12/17: Patient received one dose of Rituxan on 4/7/17  Venetoclax 10 mg every other day 4/5/17 - 4/9/17  Venetoclax 10 mg every day beginning 4/10/17  Monitoring CBC 3 times per week  4/28/17: patient had follow-up appointment at [facility] with Dr Farhana Osborne  She recommended to slowly increase dose of veneteclax  Also, she recommended as he has had numerous treatments with Rituxan, if antibody directed therapy becomes indicated in the future recommendation was with [facility]  She will be seeing her on a p r n  Basis      July 2017- Hemolysis  Disease not under control with veneteclax  Started prednisone 60 mg daily, folic acid, IBRUTINIB 753 mg daily and Gazyva       Sept 2017- 9/18-9/20 patient was admitted due to uncontrolled hyperglycemia with new onset DM type II due to chronic prednisone use for CLL/hemolytic anemia; also found to have profound neutropenia   Ibrutinib dose was reduced to 280 mg daily      October 2017- 10/7/17 - 10/14/17 patient was admitted due to cellulitis on his scalp     Dec 2017- Vernal Flaquita decreased to 140 mg PO daily due to thrombocytopenia            Current Outpatient Prescriptions:     acyclovir (ZOVIRAX) 400 MG tablet, Take 400 mg by mouth 2 (two) times a day, Disp: , Rfl:     ampicillin 2,000 mg in sodium chloride 0 9 % 100 mL IVPB, Infuse 2,000 mg into a venous catheter every 4 (four) hours for 20 days, Disp: 65604 mL, Rfl: 0    aspirin 81 MG tablet, Take 81 mg by mouth every other day Every other day , Disp: , Rfl:     citalopram (CeleXA) 40 mg tablet, Take 40 mg by mouth daily, Disp: , Rfl:     fenofibrate (TRIGLIDE) 50 MG tablet, Take 134 mg by mouth daily  , Disp: , Rfl:     folic acid (FOLVITE) 1 mg tablet, Take 1 mg by mouth daily, Disp: , Rfl:     gabapentin (NEURONTIN) 600 MG tablet, Take 600 mg by mouth 2 (two) times a day  , Disp: , Rfl:     glucose monitoring kit (FREESTYLE) monitoring kit, 1 each by Does not apply route as needed ( ) E 11 9, Disp: 1 each, Rfl: 0    insulin glargine (LANTUS) 100 units/mL subcutaneous injection, Inject 15 Units under the skin every morning (Patient taking differently: Inject 12 Units under the skin every morning  ), Disp: 10 mL, Rfl: 0    insulin lispro (HumaLOG) 100 units/mL injection, Inject 10 Units under the skin 3 (three) times a day with meals, Disp: , Rfl: 0    Lancets (FREESTYLE) lancets, Use as instructed--test 2 times a day  E 11 9, Disp: 100 each, Rfl: 0    multivitamin (THERAGRAN) TABS, Take 1 tablet by mouth daily, Disp: , Rfl:     obinutuzumab (GAZYVA) 1000 mg/40 mL SOLN, Infuse into a venous catheter, Disp: , Rfl:     pantoprazole (PROTONIX) 40 mg tablet, Take 40 mg by mouth daily  , Disp: , Rfl:     predniSONE 10 mg tablet, Take 10 mg by mouth daily, Disp: , Rfl:     sodium chloride, PF, 0 9 %, 10 mL by Intracatheter route see administration instructions 10mL into port before and after ampicillin doses, Disp: , Rfl: 0    VENTOLIN  (90 Base) MCG/ACT inhaler, INHALE 2 PUFFS 4 TIMES A DAY AS NEEDED, Disp: , Rfl: 3    acetaminophen (TYLENOL) 325 mg tablet, Take 2 tablets by mouth every 6 (six) hours as needed for fever (fever > 101 4), Disp: 60 tablet, Rfl: 0    Ibrutinib 140 MG CAPS, Take 1 capsule by mouth daily, Disp: 30 capsule, Rfl: 1    Allergies   Allergen Reactions    Heparin Other (See Comments)     Other reaction(s): Blood Disorders  clot    Macrolides And Ketolides Other (See Comments)     Interacts with other meds he is taking       ROS:  05/02/18 Reviewed 13 systems:  Presently no headaches, seizures, dizziness, diplopia, dysphagia, hoarseness, chest pain, palpitations,  hemoptysis, abdominal pain, nausea,  change in bowel habits, melena, hematuria, fever, chills, bleeding, bone pains, skin rash, weight loss, arthritic symptoms,  weakness, numbness,  claudication and gait problem  Not unusually sensitive to heat or cold  No swelling of the ankles  No swollen glands  Patient is anxious   Other symptoms are in HPI        /72   Pulse 78   Temp 98 2 °F (36 8 °C) (Tympanic)   Resp 18   Ht 6' 1" (1 854 m)   Wt 102 kg (224 lb 9 6 oz)   SpO2 98%   BMI 29 63 kg/m²     Physical Exam:  Alert, oriented, not in distress, no icterus, no oral thrush, no palpable neck mass, clear lung fields, regular heart rate, abdomen  soft and non tender, no palpable abdominal mass, no ascites, no edema of ankles, no calf tenderness, no focal neurological deficit, no skin rash, no palpable lymphadenopathy, good arterial pulses, no clubbing  Vascular purpura forearms  Patient is anxious  Accessed Port-A-Cath for antibiotics Performance status 1      IMAGING:  No orders to display       LABS:  Results for orders placed or performed during the hospital encounter of 04/30/18   Comprehensive metabolic panel   Result Value Ref Range    Sodium 144 136 - 145 mmol/L    Potassium 3 9 3 5 - 5 3 mmol/L    Chloride 109 (H) 98 - 108 mmol/L    CO2 26 21 - 32 mmol/L    Anion Gap 9 4 - 13 mmol/L    BUN 18 5 - 25 mg/dL    Creatinine 1 00 0 60 - 1 30 mg/dL    Glucose 107 65 - 140 mg/dL    Calcium 9 2 8 3 - 10 1 mg/dL    AST 26 5 - 45 U/L    ALT 37 12 - 78 U/L    Alkaline Phosphatase 57 46 - 116 U/L    Total Protein 5 7 (L) 6 4 - 8 2 g/dL    Albumin 3 7 3 5 - 5 7 g/dL    Total Bilirubin 0 60 0 20 - 1 00 mg/dL    eGFR 80 ml/min/1 73sq m   CBC and differential   Result Value Ref Range    WBC 3 60 (L) 4 31 - 10 16 Thousand/uL    Adjusted WBC 3 60 (L) 4 31 - 10 16 Thousand/uL    RBC 4 31 3 88 - 5 62 Million/uL    Hemoglobin 12 8 12 0 - 17 0 g/dL    Hematocrit 38 3 36 5 - 49 3 %    MCV 89 82 - 98 fL    MCH 29 7 26 8 - 34 3 pg    MCHC 33 5 31 4 - 37 4 g/dL    RDW 15 0 11 6 - 15 1 %    MPV 7 8 (L) 8 9 - 12 7 fL    Platelets 89 (L) 152 - 390 Thousands/uL   Manual Differential (Non Wam)   Result Value Ref Range    Segmented % 79 (H) 43 - 75 %    Bands % 0 0 - 8 %    Lymphocytes % 15 14 - 44 %    Monocytes % 4 4 - 12 %    Eosinophils % 2 0 - 6 %    Basophils % 0 0 - 1 %    Neutrophils Absolute 2 84 1 81 - 6 82 Thousand/uL    Lymphocytes Absolute 0 54 (L) 0 60 - 4 47 Thousand/uL    Monocytes Absolute 0 14 0 00 - 1 22 Thousand/uL    Eosinophils Absolute 0 07 0 00 - 0 61 Thousand/uL    Basophils Absolute 0 00 0 00 - 0 10 Thousand/uL    Total Counted 100     Ovalocytes Present     Platelet Estimate Decreased (A) Adequate    Large Platelet Present      Labs, Imaging, & Other studies:   All pertinent labs and imaging studies were personally reviewed    Lab Results   Component Value Date     04/30/2018    K 3 9 04/30/2018     (H) 04/30/2018    CO2 26 04/30/2018    ANIONGAP 9 04/30/2018    BUN 18 04/30/2018    CREATININE 1 00 04/30/2018    GLUCOSE 107 04/30/2018    GLUF 118 (H) 04/24/2018    CALCIUM 9 2 04/30/2018    AST 26 04/30/2018    ALT 37 04/30/2018    ALKPHOS 57 04/30/2018    PROT 5 7 (L) 04/30/2018    BILITOT 0 60 04/30/2018    EGFR 80 04/30/2018     Lab Results   Component Value Date    WBC 3 60 (L) 04/30/2018    HGB 12 8 04/30/2018    HCT 38 3 04/30/2018    MCV 89 04/30/2018    PLT 89 (L) 04/30/2018       Reviewed and discussed with patient  Assessment and plan:   Patient was in the hospital recently with the listeria and he is still on intravenous antibiotics and he tells me that he will be on it for 2 more weeks  I reviewed hospital records  He had positive blood cultures  He was hospitalized with fever chills and vomiting  No more fevers  No more chills  No more vomiting  He has chronic cough and exertional dyspnea  He has tiredness  He has vascular purpura on forearms as before  He is taking prednisone 10 mg daily and folic acid 1 mg daily but ibrutinib and gazyva  are on hold  There is improvement in his blood counts  Assessment and Plan:  1  CLL (chronic lymphocytic leukemia) (HCC)  - Patient is on Imbruvica 140 mg PO daily and Gazyva 1000 mg/m2 every 4 weeks      On hold now   - CBC has shown improvement in blood counts    2  Other autoimmune hemolytic anemias ([facility] [address] )  - Patient is on prednisone 10 mg PO daily  He is taking multivitamin  Advise he needs minimum 600 mg calcium and 1000 IU of vitamin D    -  On folic acid 1 mg daily   3  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

## 2018-05-07 ENCOUNTER — TRANSCRIBE ORDERS (OUTPATIENT)
Dept: LAB | Facility: CLINIC | Age: 63
End: 2018-05-07

## 2018-05-07 ENCOUNTER — APPOINTMENT (OUTPATIENT)
Dept: LAB | Facility: CLINIC | Age: 63
End: 2018-05-07
Payer: MEDICARE

## 2018-05-07 ENCOUNTER — HOSPITAL ENCOUNTER (OUTPATIENT)
Dept: INFUSION CENTER | Facility: CLINIC | Age: 63
Discharge: HOME | End: 2018-05-07

## 2018-05-07 DIAGNOSIS — C91.10 CLL (CHRONIC LYMPHOCYTIC LEUKEMIA) (HCC): Primary | ICD-10-CM

## 2018-05-07 DIAGNOSIS — A32.9: Primary | ICD-10-CM

## 2018-05-07 LAB
ALBUMIN SERPL BCP-MCNC: 3.6 G/DL (ref 3.5–5.7)
ALP SERPL-CCNC: 56 U/L (ref 46–116)
ALT SERPL W P-5'-P-CCNC: 29 U/L (ref 12–78)
ANION GAP SERPL CALCULATED.3IONS-SCNC: 9 MMOL/L (ref 4–13)
ANISOCYTOSIS BLD QL SMEAR: PRESENT
AST SERPL W P-5'-P-CCNC: 24 U/L (ref 5–45)
BASOPHILS # BLD AUTO: 0 THOUSAND/UL (ref 0–0.1)
BASOPHILS NFR MAR MANUAL: 0 % (ref 0–1)
BILIRUB SERPL-MCNC: 0.7 MG/DL (ref 0.2–1)
BUN SERPL-MCNC: 15 MG/DL (ref 5–25)
CALCIUM SERPL-MCNC: 9 MG/DL (ref 8.3–10.1)
CHLORIDE SERPL-SCNC: 109 MMOL/L (ref 98–108)
CO2 SERPL-SCNC: 26 MMOL/L (ref 21–32)
CREAT SERPL-MCNC: 1 MG/DL (ref 0.6–1.3)
EOSINOPHIL # BLD AUTO: 0.12 THOUSAND/UL (ref 0–0.61)
EOSINOPHIL NFR BLD MANUAL: 4 % (ref 0–6)
ERYTHROCYTE [DISTWIDTH] IN BLOOD BY AUTOMATED COUNT: 14.8 % (ref 11.6–15.1)
GFR SERPL CREATININE-BSD FRML MDRD: 80 ML/MIN/1.73SQ M
GLUCOSE SERPL-MCNC: 117 MG/DL (ref 65–140)
HCT VFR BLD AUTO: 41.1 % (ref 36.5–49.3)
HGB BLD-MCNC: 13.3 G/DL (ref 12–17)
LYMPHOCYTES # BLD AUTO: 0.78 THOUSAND/UL (ref 0.6–4.47)
LYMPHOCYTES # BLD AUTO: 26 % (ref 14–44)
MCH RBC QN AUTO: 28.7 PG (ref 26.8–34.3)
MCHC RBC AUTO-ENTMCNC: 32.3 G/DL (ref 31.4–37.4)
MCV RBC AUTO: 89 FL (ref 82–98)
MONOCYTES # BLD AUTO: 0.3 THOUSAND/UL (ref 0–1.22)
MONOCYTES NFR BLD AUTO: 10 % (ref 4–12)
NEUTS BAND NFR BLD MANUAL: 4 % (ref 0–8)
NEUTS SEG # BLD: 1.8 THOUSAND/UL (ref 1.81–6.82)
NEUTS SEG NFR BLD AUTO: 56 % (ref 43–75)
PLATELET # BLD AUTO: 102 THOUSANDS/UL (ref 149–390)
PLATELET BLD QL SMEAR: ABNORMAL
PMV BLD AUTO: 7.5 FL (ref 8.9–12.7)
POTASSIUM SERPL-SCNC: 3.9 MMOL/L (ref 3.5–5.3)
PROT SERPL-MCNC: 5.6 G/DL (ref 6.4–8.2)
RBC # BLD AUTO: 4.62 MILLION/UL (ref 3.88–5.62)
SODIUM SERPL-SCNC: 144 MMOL/L (ref 136–145)
TOTAL CELLS COUNTED SPEC: 100
WBC # BLD AUTO: 3 THOUSAND/UL (ref 4.31–10.16)
WBC NRBC COR # BLD: 3 THOUSAND/UL (ref 4.31–10.16)

## 2018-05-07 PROCEDURE — 85007 BL SMEAR W/DIFF WBC COUNT: CPT

## 2018-05-07 PROCEDURE — 80053 COMPREHEN METABOLIC PANEL: CPT

## 2018-05-07 PROCEDURE — 36415 COLL VENOUS BLD VENIPUNCTURE: CPT

## 2018-05-07 PROCEDURE — 85027 COMPLETE CBC AUTOMATED: CPT

## 2018-05-07 NOTE — PROGRESS NOTES
Pt without complaint, here for central labs to be drawn from his central line (port)  Pt has continuous IV antbx infusing via CADD pump  IV antbx stopped temporarily and labs drawn per hospital protocol  PAC flushed with NS and antbx restarted  Pt left unit in stable condition, CADD pump infusin iv antbx as ordered

## 2018-05-07 NOTE — PLAN OF CARE
Problem: Potential for Falls  Goal: Patient will remain free of falls  INTERVENTIONS:  - Assess patient frequently for physical needs  -  Identify cognitive and physical deficits and behaviors that affect risk of falls    -  [place] fall precautions as indicated by assessment   - Educate patient/family on patient safety including physical limitations  - Instruct patient to call for assistance with activity based on assessment  - Modify environment to reduce risk of injury  - Consider OT/PT consult to assist with strengthening/mobility   Outcome: Progressing

## 2018-05-08 ENCOUNTER — TELEPHONE (OUTPATIENT)
Dept: HEMATOLOGY ONCOLOGY | Facility: CLINIC | Age: 63
End: 2018-05-08

## 2018-05-08 ENCOUNTER — OFFICE VISIT (OUTPATIENT)
Dept: OBGYN CLINIC | Facility: MEDICAL CENTER | Age: 63
End: 2018-05-08
Payer: MEDICARE

## 2018-05-08 VITALS
SYSTOLIC BLOOD PRESSURE: 129 MMHG | HEART RATE: 88 BPM | HEIGHT: 73 IN | BODY MASS INDEX: 29.82 KG/M2 | WEIGHT: 225 LBS | DIASTOLIC BLOOD PRESSURE: 83 MMHG

## 2018-05-08 DIAGNOSIS — M25.512 ACUTE PAIN OF BOTH SHOULDERS: Primary | ICD-10-CM

## 2018-05-08 DIAGNOSIS — M25.511 ACUTE PAIN OF BOTH SHOULDERS: Primary | ICD-10-CM

## 2018-05-08 PROCEDURE — 99204 OFFICE O/P NEW MOD 45 MIN: CPT | Performed by: ORTHOPAEDIC SURGERY

## 2018-05-08 RX ORDER — BENZONATATE 200 MG/1
CAPSULE ORAL
Status: ON HOLD | COMMUNITY
End: 2018-06-20

## 2018-05-08 RX ORDER — CLOTRIMAZOLE 10 MG/1
LOZENGE ORAL; TOPICAL
COMMUNITY
End: 2018-05-09

## 2018-05-08 RX ORDER — SUCRALFATE ORAL 1 G/10ML
SUSPENSION ORAL
COMMUNITY
End: 2018-05-09

## 2018-05-08 RX ORDER — ALLOPURINOL 100 MG/1
TABLET ORAL
Status: ON HOLD | COMMUNITY
End: 2018-06-20

## 2018-05-08 RX ORDER — FUROSEMIDE 40 MG/1
TABLET ORAL
COMMUNITY
End: 2019-12-04 | Stop reason: SDUPTHER

## 2018-05-08 RX ORDER — CLOPIDOGREL BISULFATE 75 MG/1
TABLET ORAL
Status: ON HOLD | COMMUNITY
End: 2018-06-20

## 2018-05-08 NOTE — PROGRESS NOTES
Orthopaedic Surgery - Office Note  Deirdre Matson (59 y o  male)   : 1955   MRN: [id number]  Encounter Date: 2018    No chief complaint on file  Assessment / Plan  Bilateral shoulder pain most likely secondary to chemo therapy, left shoulder glenohumeral osteoarthritis mild to moderate in nature    · Due to resolution of symptoms following stop of medication, the patient was given home exercise program which to use to maintain his range of motion  The patient did have mild arthritic changes in his shoulders though again at this time they are not very limiting  · Continue with ice and analgesics as needed  · We did discuss option for steroid injection in his left shoulder in the future if it was to bother him but will hold off at this time  Return if symptoms worsen or fail to improve  The patient will call us if his symptoms return  History of Present Illness  Deirdre Matson is a 60 y o  male who presents for evaluation of bilateral shoulder pain which started approximately 2 months ago right worse than left and seem to be consistent with with this start of his chemo medications that he takes for CLL  He states that due to a Listeria infection he has had to stop the medication and since over the last 3 weeks his pain in his shoulders has improved greatly  At this time his left seems to bother him more than his right and when he gets pain in either at the very small amount in the area of the subacromial space  He has not done anything to treat shoulders besides stopping the medication  Review of Systems  Pertinent items are noted in HPI  All other systems were reviewed and are negative  Physical Exam  /83   Pulse 88   Ht 6' 1" (1 854 m)   Wt 102 kg (225 lb)   BMI 29 69 kg/m²   Cons: Appears well  No apparent distress  Psych: Alert  Oriented x3  Mood and affect normal   Eyes: PERRLA, EOMI  Resp: Normal effort  No audible wheezing or stridor  CV: Palpable pulse    No discernable arrhythmia  No LE edema  Lymph:  No palpable cervical, axillary, or inguinal lymphadenopathy  Skin: Warm  No palpable masses  No visible lesions  Neuro: Normal muscle tone  Normal and symmetric DTR's  Bilateral Shoulder Exam  Alignment / Posture:  Normal shoulder posture  Normal scapular position  Inspection:  No swelling  No edema  No erythema  No ecchymosis  No deformity  Palpation:  Mild tenderness at The subacromial space bilaterally  ROM:  Normal shoulder ROM  Shoulder ° bilaterally  Shoulder ER 90° bilaterally  Shoulder IR T6 on the right and T10 on left  Strength:  5/5 supraspinatus, infraspinatus, and subscapularis  Stability:  No objective shoulder instability  Tests: (-) [place]  (-) Neer  (-) Drop arm  (-) Painful arc  (-) Belly press  (-) Lift off  (-) Speed  (-) [place]  (-) Internal impingement test  (-) [place]  Neurovascular:  Sensation intact in Ax/R/M/U nerve distributions  Sensation intact in all digital nerve distributions  Fingers warm and perfused  Studies Reviewed  XR of bilateral shoulder - Show no fractures or dislocations on either shoulder  The right shoulder revealed mild AC joint arthritis with mild spurring  The left shoulder revealed mild to moderate glenohumeral narrowing with moderate AC joint arthritis and spurring over the [facility] joint and inferior aspect of the glenoid  Procedures  No procedures today  Medical, Surgical, Family, and Social History  The patient's medical history, family history, and social history, were reviewed and updated as appropriate      Past Medical History:   Diagnosis Date    CAD (coronary artery disease) 2004    Cellulitis of scalp     CKD (chronic kidney disease) stage 3, GFR 30-59 ml/min     [facility]LL [facility]ocytic leukemia) ([facility] [address]     COPD (chronic obstructive pulmonary disease) ([facility] [address]     Diabetes mellitus ([facility] [address] )     H/O blood clots     Hemolytic anemia (HCC)     History of TIA (transient ischemic attack)     Hyperlipidemia     Hypertension     Multiple gastric ulcers     Myocardial infarction ([facility] [address] )     Recurrent sinusitis     Thrombocytopenia (HCC)     Vertigo        Past Surgical History:   Procedure Laterality Date    ARTHROSCOPIC REPAIR ACL      lt knee    CARDIAC SURGERY      cardiac cath with stent    KNEE ARTHROSCOPY      PORTACATH PLACEMENT      SD ESOPHAGOGASTRODUODENOSCOPY TRANSORAL DIAGNOSTIC N/A 10/5/2017    Procedure: ESOPHAGOGASTRODUODENOSCOPY (EGD) with bx;  Surgeon: Jeremi Santos DO;  Location: [facility]; Service: Gastroenterology    SD ESOPHAGOGASTRODUODENOSCOPY TRANSORAL DIAGNOSTIC N/A 3/8/2018    Procedure: ESOPHAGOGASTRODUODENOSCOPY (EGD) with biopsy;  Surgeon: Jeremi Santos DO;  Location: [facility]; Service: Gastroenterology    SD ESOPHAGOSCOPY FLEXIBLE TRANSORAL WITH BIOPSY N/A 9/2/2016    Procedure: ESOPHAGOGASTRODUODENOSCOPY (EGD); ESOPHAGEAL DILATION; ESOPHAGEAL BIOPSY;  Surgeon: Geno Cedeño MD;  [facility]: [facility];  Service: Thoracic    TONSILLECTOMY      UPPER GASTROINTESTINAL ENDOSCOPY      x 6       History reviewed  No pertinent family history  Social History     Occupational History    Not on file       Social History Main Topics    Smoking status: Former Smoker    Smokeless tobacco: Never Used      Comment: pt refuses to answer question    Alcohol use No    Drug use: No    Sexual activity: Not on file       Allergies   Allergen Reactions    Heparin Other (See Comments)     Other reaction(s): Blood Disorders  clot    Macrolides And Ketolides Other (See Comments)     Interacts with other meds he is taking         Current Outpatient Prescriptions:     acetaminophen (TYLENOL) 325 mg tablet, Take 2 tablets by mouth every 6 (six) hours as needed for fever (fever > 101 4), Disp: 60 tablet, Rfl: 0    acyclovir (ZOVIRAX) 400 MG tablet, Take 400 mg by mouth 2 (two) times a day, Disp: , Rfl:     allopurinol (ZYLOPRIM) 100 mg tablet, allopurinol 100 mg tablet, Disp: , Rfl:     ampicillin 2,000 mg in sodium chloride 0 9 % 100 mL IVPB, Infuse 2,000 mg into a venous catheter every 4 (four) hours for 20 days, Disp: 15796 mL, Rfl: 0    aspirin 81 MG tablet, Take 81 mg by mouth every other day Every other day , Disp: , Rfl:     benzonatate (TESSALON) 200 MG capsule, benzonatate 200 mg capsule, Disp: , Rfl:     citalopram (CeleXA) 40 mg tablet, Take 40 mg by mouth daily, Disp: , Rfl:     clopidogrel (PLAVIX) 75 mg tablet, clopidogrel 75 mg tablet, Disp: , Rfl:     clotrimazole (MYCELEX) 10 mg estefania, clotrimazole 10 mg estefania, Disp: , Rfl:     fenofibrate (TRIGLIDE) 50 MG tablet, Take 134 mg by mouth daily  , Disp: , Rfl:     folic acid (FOLVITE) 1 mg tablet, Take 1 mg by mouth daily, Disp: , Rfl:     furosemide (LASIX) 40 mg tablet, furosemide 40 mg tablet, Disp: , Rfl:     gabapentin (NEURONTIN) 600 MG tablet, Take 600 mg by mouth 2 (two) times a day  , Disp: , Rfl:     glucose monitoring kit (FREESTYLE) monitoring kit, 1 each by Does not apply route as needed ( ) E 11 9, Disp: 1 each, Rfl: 0    Ibrutinib 140 MG CAPS, Take 1 capsule by mouth daily, Disp: 30 capsule, Rfl: 1    insulin glargine (LANTUS) 100 units/mL subcutaneous injection, Inject 15 Units under the skin every morning (Patient taking differently: Inject 12 Units under the skin every morning  ), Disp: 10 mL, Rfl: 0    insulin lispro (HumaLOG) 100 units/mL injection, Inject 10 Units under the skin 3 (three) times a day with meals, Disp: , Rfl: 0    Lancets (FREESTYLE) lancets, Use as instructed--test 2 times a day  E 11 9, Disp: 100 each, Rfl: 0    multivitamin (THERAGRAN) TABS, Take 1 tablet by mouth daily, Disp: , Rfl:     obinutuzumab (GAZYVA) 1000 mg/40 mL SOLN, Infuse into a venous catheter, Disp: , Rfl:     pantoprazole (PROTONIX) 40 mg tablet, Take 40 mg by mouth daily  , Disp: , Rfl:     predniSONE 10 mg tablet, Take 10 mg by mouth daily, Disp: , Rfl:     sodium chloride, PF, 0 9 %, 10 mL by Intracatheter route see administration instructions 10mL into port before and after ampicillin doses, Disp: , Rfl: 0    sucralfate (CARAFATE) 1 g/10 mL suspension, Carafate 100 mg/mL oral suspension, Disp: , Rfl:     VENTOLIN  (90 Base) MCG/ACT inhaler, INHALE 2 PUFFS 4 TIMES A DAY AS NEEDED, Disp: , Rfl: 3      Sasha Cazares PA-C    Scribe Attestation    I,:    am acting as a scribe while in the presence of the attending physician :        I,:    personally performed the services described in this documentation    as scribed in my presence :

## 2018-05-08 NOTE — TELEPHONE ENCOUNTER
Pt is calling because he went to the [facility], who told the pt that all the blood work from dr Mora Innocent has been cancelled, pt wants to know if this is a mistake   Can you please call the pt and clarify to the pt why it has been cancelled

## 2018-05-09 ENCOUNTER — OFFICE VISIT (OUTPATIENT)
Dept: INFECTIOUS DISEASES | Facility: CLINIC | Age: 63
End: 2018-05-09
Payer: MEDICARE

## 2018-05-09 VITALS
RESPIRATION RATE: 20 BRPM | TEMPERATURE: 98 F | BODY MASS INDEX: 29.82 KG/M2 | SYSTOLIC BLOOD PRESSURE: 120 MMHG | HEART RATE: 71 BPM | DIASTOLIC BLOOD PRESSURE: 68 MMHG | WEIGHT: 225 LBS | HEIGHT: 73 IN

## 2018-05-09 DIAGNOSIS — T45.1X5A LEUKOPENIA DUE TO ANTINEOPLASTIC CHEMOTHERAPY (HCC): ICD-10-CM

## 2018-05-09 DIAGNOSIS — C91.10 CLL (CHRONIC LYMPHOCYTIC LEUKEMIA) (HCC): ICD-10-CM

## 2018-05-09 DIAGNOSIS — A32.9 LISTERIA INFECTION: Primary | ICD-10-CM

## 2018-05-09 DIAGNOSIS — D70.1 LEUKOPENIA DUE TO ANTINEOPLASTIC CHEMOTHERAPY (HCC): ICD-10-CM

## 2018-05-09 DIAGNOSIS — K52.9 COLITIS: ICD-10-CM

## 2018-05-09 PROCEDURE — 99214 OFFICE O/P EST MOD 30 MIN: CPT | Performed by: INTERNAL MEDICINE

## 2018-05-09 NOTE — PROGRESS NOTES
Assessment/Plan:    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[facility] [address] )  Listeria bacteremia  Prior to patient's hospitalization he had an episode of vomiting along with fever  Blood cultures eventually returned positive for listeria  He was eventually discharged home to complete a total of 3 weeks of IV ampicillin  He is tolerating the antibiotics without difficulty  Patient is adamant that he wants repeat blood cultures done after completion of his antibiotics  I tried to discuss with patient that we do not routinely order surveillance blood cultures with this type of infection but I will do so on his request   I also tried to discuss foods that can be associated with listeria and what to try and avoid if possible  Patient states he is on a limited diet and was not receptive to the discussion  For now, continue Ampicillin as ordered through 5/16/18  No further office visit needed at this time  Diagnoses and all orders for this visit:    Listeria infection  -     Blood culture; Future  -     Blood culture; Future    Colitis    CLL (chronic lymphocytic leukemia) (HCC)    Leukopenia due to antineoplastic chemotherapy (HCC)    Other orders  -     allopurinol (ZYLOPRIM) 100 mg tablet; allopurinol 100 mg tablet  -     benzonatate (TESSALON) 200 MG capsule; benzonatate 200 mg capsule  -     Discontinue: sucralfate (CARAFATE) 1 g/10 mL suspension; Carafate 100 mg/mL oral suspension  -     clopidogrel (PLAVIX) 75 mg tablet; clopidogrel 75 mg tablet  -     Discontinue: clotrimazole (MYCELEX) 10 mg estefania; clotrimazole 10 mg estefania  -     furosemide (LASIX) 40 mg tablet; furosemide 40 mg tablet      Please note, I did not order the medications listed under other orders  Subjective:      Patient ID: Phyllis Wang is a 60 y o  male  HPI  61 y/o WM with history of CLL on chemotherapy, chronic neutropenia  presents for office f/u today regarding Listeria bacteremia    Prior to patient's hospitalization he had an episode of vomiting along with fever  Blood cultures eventually returned positive for listeria  He was eventually discharged home to complete a total of 3 weeks of IV ampicillin  He has been tolerating the antibiotic without difficulty  He is infusing through his PORT  He denies any fever, chills, diarrhea, rash  The following portions of the patient's history were reviewed and updated as appropriate: allergies, current medications, past family history, past medical history, past social history, past surgical history and problem list     Review of Systems   Constitutional: Negative for chills and fever  HENT: Negative for mouth sores, sore throat and trouble swallowing  Respiratory: Negative for cough and shortness of breath  Cardiovascular: Negative for chest pain, palpitations and leg swelling  Gastrointestinal: Negative for abdominal pain, diarrhea, nausea and vomiting  Genitourinary: Negative for difficulty urinating, dysuria and frequency  Musculoskeletal: Negative for arthralgias, back pain, joint swelling and myalgias  Skin: Negative for rash and wound  Allergic/Immunologic: Positive for immunocompromised state  Neurological: Negative for headaches  Objective:      /68 (BP Location: Left arm, Patient Position: Sitting, Cuff Size: Adult)   Pulse 71   Temp 98 °F (36 7 °C) (Tympanic)   Resp 20   Ht 6' 1" (1 854 m)   Wt 102 kg (225 lb)   BMI 29 69 kg/m²          Physical Exam   Constitutional: He is oriented to person, place, and time  He appears well-developed and well-nourished  No distress  HENT:   Head: Normocephalic and atraumatic  Nose: Nose normal    Mouth/Throat: Oropharynx is clear and moist  No oropharyngeal exudate  Eyes: Conjunctivae and EOM are normal  Pupils are equal, round, and reactive to light  Right eye exhibits no discharge  Left eye exhibits no discharge  No scleral icterus  Neck: Normal range of motion  Neck supple     Cardiovascular: Normal rate and regular rhythm  Pulmonary/Chest: Effort normal and breath sounds normal  No respiratory distress  He has no wheezes  He has no rales  He exhibits no tenderness  Abdominal: Soft  Bowel sounds are normal  He exhibits no distension  There is no tenderness  Musculoskeletal: Normal range of motion  He exhibits no edema  Neurological: He is alert and oriented to person, place, and time  Skin: Skin is warm and dry  No rash noted  He is not diaphoretic  No erythema     PORT nontender, no erythema       Labs:   5/7/18  WBC: 3 0  Plt: 102  Cr: 1 00

## 2018-05-09 NOTE — ASSESSMENT & PLAN NOTE
Listeria bacteremia  Prior to patient's hospitalization he had an episode of vomiting along with fever  Blood cultures eventually returned positive for listeria  He was eventually discharged home to complete a total of 3 weeks of IV ampicillin  He is tolerating the antibiotics without difficulty  Patient is adamant that he wants repeat blood cultures done after completion of his antibiotics  I tried to discuss with patient that we do not routinely order surveillance blood cultures with this type of infection but I will do so on his request   I also tried to discuss foods that can be associated with listeria and what to try and avoid if possible  Patient states he is on a limited diet and was not receptive to the discussion  For now, continue Ampicillin as ordered through 5/16/18  No further office visit needed at this time

## 2018-05-10 ENCOUNTER — TELEPHONE (OUTPATIENT)
Dept: HEMATOLOGY ONCOLOGY | Facility: CLINIC | Age: 63
End: 2018-05-10

## 2018-05-10 DIAGNOSIS — C91.10 CHRONIC LYMPHATIC LEUKEMIA (HCC): ICD-10-CM

## 2018-05-10 LAB
BACTERIA BLD CULT: ABNORMAL
GRAM STN SPEC: ABNORMAL

## 2018-05-10 NOTE — TELEPHONE ENCOUNTER
Called an informed patient that his appt time moved to 9:00 am  Please stated he was ok with the move and will still be at the appt

## 2018-05-10 NOTE — TELEPHONE ENCOUNTER
Danay from [facility] calling to request a new prescription for the Ibruvica  The formulation has changed to tablets  Needs Rx to be for 280 mg one daily  It comes in blister pack of 28 tablets (they cannot break up the blister packs)  Call with questions

## 2018-05-11 ENCOUNTER — TELEPHONE (OUTPATIENT)
Dept: ENDOCRINOLOGY | Facility: CLINIC | Age: 63
End: 2018-05-11

## 2018-05-11 DIAGNOSIS — C91.10 CHRONIC LYMPHATIC LEUKEMIA (HCC): ICD-10-CM

## 2018-05-14 ENCOUNTER — HOSPITAL ENCOUNTER (OUTPATIENT)
Dept: INFUSION CENTER | Facility: CLINIC | Age: 63
Discharge: HOME | End: 2018-05-14
Payer: MEDICARE

## 2018-05-14 ENCOUNTER — APPOINTMENT (OUTPATIENT)
Dept: LAB | Facility: CLINIC | Age: 63
End: 2018-05-14
Payer: MEDICARE

## 2018-05-14 DIAGNOSIS — C91.10 CLL (CHRONIC LYMPHOCYTIC LEUKEMIA) (HCC): Primary | ICD-10-CM

## 2018-05-14 NOTE — PLAN OF CARE
Problem: Potential for Falls  Goal: Patient will remain free of falls  INTERVENTIONS:  - Assess patient frequently for physical needs  -  Identify cognitive and physical deficits and behaviors that affect risk of falls    -  [place] fall precautions as indicated by assessment   - Educate patient/family on patient safety including physical limitations  - Instruct patient to call for assistance with activity based on assessment  - Modify environment to reduce risk of injury  - Consider OT/PT consult to assist with strengthening/mobility   Outcome: Progressing

## 2018-05-14 NOTE — PROGRESS NOTES
Pt without complaint, here for central labs to be drawn from his central line (port)  Pt has continuous IV antbx infusing via CADD pump  IV antbx stopped temporarily and labs drawn per hospital protocol  PAC flushed with NS only and antbx restarted  Pt left unit in stable condition, CADD pump infusing iv antbx as ordered

## 2018-05-15 ENCOUNTER — TELEPHONE (OUTPATIENT)
Dept: HEMATOLOGY ONCOLOGY | Facility: CLINIC | Age: 63
End: 2018-05-15

## 2018-05-15 NOTE — TELEPHONE ENCOUNTER
Nissa called from [facility], needs prescription to read  ImbruvicaTablets

## 2018-05-15 NOTE — TELEPHONE ENCOUNTER
Called [facility] and clarified that a prescription was e-scribed to them on 5/12/18  [facility] never received the script, so a verbal order was given

## 2018-05-16 ENCOUNTER — OFFICE VISIT (OUTPATIENT)
Dept: HEMATOLOGY ONCOLOGY | Facility: CLINIC | Age: 63
End: 2018-05-16
Payer: MEDICARE

## 2018-05-16 VITALS
BODY MASS INDEX: 29.55 KG/M2 | DIASTOLIC BLOOD PRESSURE: 82 MMHG | HEART RATE: 81 BPM | HEIGHT: 73 IN | RESPIRATION RATE: 17 BRPM | OXYGEN SATURATION: 97 % | SYSTOLIC BLOOD PRESSURE: 128 MMHG | TEMPERATURE: 98.6 F | WEIGHT: 223 LBS

## 2018-05-16 DIAGNOSIS — C91.10 CLL (CHRONIC LYMPHOCYTIC LEUKEMIA) (HCC): Primary | ICD-10-CM

## 2018-05-16 DIAGNOSIS — D69.6 THROMBOCYTOPENIA (HCC): ICD-10-CM

## 2018-05-16 DIAGNOSIS — D75.82 HIT (HEPARIN-INDUCED THROMBOCYTOPENIA) (HCC): ICD-10-CM

## 2018-05-16 DIAGNOSIS — D59.19 OTHER AUTOIMMUNE HEMOLYTIC ANEMIAS: ICD-10-CM

## 2018-05-16 PROCEDURE — 99214 OFFICE O/P EST MOD 30 MIN: CPT | Performed by: PHYSICIAN ASSISTANT

## 2018-05-16 NOTE — PROGRESS NOTES
Hematology/Oncology Outpatient Follow-up  Antoniette Sever 60 y o  male 1955 [id number]    Date:  5/16/2018      Assessment and Plan:  1  CLL (chronic lymphocytic leukemia) ([facility] )  Patient was recently hospitalized for/04/23/2027  He was found to have list urea bacteremia  He has been on IV antibiotics, ampicillin since discharge  He is completing therapy today  We will restart patient's improved workup on Monday, 05/21/2018  Patient's last treatment of Suzie Chalk was on 04/11/2018  This is given every 4 weeks  We will plan to schedule Gazayva for next week, at least 1 week since disconnect from IV antibiotics  2  Thrombocytopenia ([place] [address] )  This is secondary to CLL as well as treatment for CLL  This remains stable  3  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)  Patient is aware of this  No heparin  4  Other autoimmune hemolytic anemias ([facility] )  Patient's hemoglobin this week was 14 3  He continues on prednisone 10 mg p o  daily  He is to continue this  HPI:  On 3/20/17 patient found to have hemoglobin of 6 2, ,000  He was admitted to [facility] for blood transfusion and plan to transfer to [address] [place] [address]  On 3/20/17 WBC count 195,000, hemoglobin 4 9, platelet count 65  Direct coomb's was positive with undetectable haptoglobin  He was given 2 units of PRBCs, Solu-Medrol 1 g ×3 days and IVIG 1 g/kg daily ×3 days  His hemoglobin continued to drop  Bone marrow biopsy on 3/21 showed marrow cellularity of greater than 95% almost replaced by small lymphocytes, lambda light chain restricted, CD20 positive, CD19 positive, CD23 positive partially expressing CD11c and CD25 and CD5 positive and negative for CD 79 and CD38  He was treated with single dose of chlorambucil to control his CLL   3/22 second dose of chlorambucil, also Rituxan 375 mg/M ² autoimmune hemolytic anemia  WBC continued to rise, 266,000  Patient initiated with Venetoclax 20 mg daily   Tumor lysis monitored every 8 hours; given aggressive hydration and Lasix and remained euvolemic  3/24-WBC dropped to 112,000  3/25- WBC 63,000  3/26-WBC 15,000, , platelet count 07,636  Patient became febrile, 101 3  The cefepime initiated Venetoclax held  3/28-patient fell from bed, CT head negative  ANC 1200, cefepime discontinued and Levaquin 75 mg daily started sliding 3/29 given second dose of rituximab 375 mg/m ²   3/30-WBC meg to 14 5 thousand, platelets 14,339, Venetoclax restarted 10 mg every other day  3/31 WBC 4 4, , platelet count 47,113  4/1-4/4: WBC maintained less than 10,000, ANC and platelet count meg  He was discharged on Venetoclax 10 mg every other day  He was instructed to discontinue simvastatin, Ranexa, aspirin, metoprolol 50 mg q  day, losartan 50 mg q  day, Plavix 75 mg q  day, folic acid 197 µg b i d , prednisone 60 mg, allopurinol  He was advised to continue Levaquin 750 mg daily for 10 days, Lexapro 10 mg daily, fenofibrate 50 mg daily, gabapentin 100 mg twice daily, Protonix 40 mg daily     Imaging studies performed at [facility]:  Patient had echocardiogram while inpatient at [facility] on 3/21  This study was unremarkable  CT chest abdomen pelvis without contrast on 3/24 showed stable pulmonary nodules, patchy groundglass densities of lower lobes previously identified and which may represent volume loss or early infiltrate  Persistent diffuse bronchial wall thickening suggesting acute/chronic bronchitis changes  No bronchiectasis  No masses  Stable lymphadenopathy of mediastinum  Stable axillary lymphadenopathy  Splenomegaly 23 9 cm  Extensive lymphadenopathy throughout the entire retroperitoneal, small bowel mesentery, and pelvis  Largest lymph node in right lower quadrant measured 4 6 x 4 8  Stable enlarged left para-aortic lymph node measuring 6 2 x 6 8   4/12/17: Patient received one dose of Rituxan on 4/7/17  Venetoclax 10 mg every other day 4/5/17 - 4/9/17   Venetoclax 10 mg every day beginning 4/10/17  Monitoring CBC 3 times per week  4/28/17: patient had follow-up appointment at [facility] with Dr Rin Magallanes  She recommended to slowly increase dose of veneteclax  Also, she recommended as he has had numerous treatments with Rituxan, if antibody directed therapy becomes indicated in the future recommendation was with [facility]  She will be seeing her on a p r n  Basis      July 2017- Hemolysis  Disease not under control with veneteclax  Started prednisone 60 mg daily, folic acid, IBRUTINIB 846 mg daily and Gazyva       Sept 2017- 9/18-9/20 patient was admitted due to uncontrolled hyperglycemia with new onset DM type II due to chronic prednisone use for CLL/hemolytic anemia; also found to have profound neutropenia  Ibrutinib dose was reduced to 280 mg daily      October 2017- 10/7/17 - 10/14/17 patient was admitted due to cellulitis on his scalp     Dec 2017- Gale Revering decreased to 140 mg PO daily due to thrombocytopenia     4/23 - 4/27 patient was admitted due to listeria bacteremia  He was discharged on IV ampicillin  Echo negative for vegetations  Imbruvica and [facility] was on hold at that time  Repeat CBC on 5/14/18 showed normal ANC, and hemoglobin  Platelets adequate at 83K    ROS: Review of Systems   Constitutional: Negative for appetite change, chills, fever and unexpected weight change  HENT: Negative for mouth sores and nosebleeds  Respiratory: Negative for cough, chest tightness, shortness of breath and wheezing  Cardiovascular: Negative for chest pain, palpitations and leg swelling  Gastrointestinal: Negative for abdominal pain, blood in stool, constipation, diarrhea, nausea and vomiting  Genitourinary: Negative for difficulty urinating, dysuria and hematuria  Musculoskeletal: Positive for arthralgias (knees are better, still has shoulder pain )  Negative for joint swelling and myalgias  Skin: Negative      Neurological: Negative for dizziness, weakness, light-headedness, numbness and headaches  Hematological: Negative  Psychiatric/Behavioral: Negative  Past Medical History:   Diagnosis Date    CAD (coronary artery disease) 2004    Cellulitis of scalp     CKD (chronic kidney disease) stage 3, GFR 30-59 ml/min     CLL (chronic lymphocytic leukemia) ([facility] )     COPD (chronic obstructive pulmonary disease) ([place] [address] )     Diabetes mellitus ([place] [address] )     H/O blood clots     Hemolytic anemia (HCC)     History of TIA (transient ischemic attack)     Hyperlipidemia     Hypertension     Multiple gastric ulcers     Myocardial infarction ([place] [address] )     Recurrent sinusitis     Thrombocytopenia (HCC)     Vertigo        Past Surgical History:   Procedure Laterality Date    ARTHROSCOPIC REPAIR ACL      lt knee    CARDIAC SURGERY      cardiac cath with stent    KNEE ARTHROSCOPY      PORTACATH PLACEMENT      IN ESOPHAGOGASTRODUODENOSCOPY TRANSORAL DIAGNOSTIC N/A 10/5/2017    Procedure: ESOPHAGOGASTRODUODENOSCOPY (EGD) with bx;  Surgeon: Tejas Maria DO;  Location: [facility]; Service: Gastroenterology    IN ESOPHAGOGASTRODUODENOSCOPY TRANSORAL DIAGNOSTIC N/A 3/8/2018    Procedure: ESOPHAGOGASTRODUODENOSCOPY (EGD) with biopsy;  Surgeon: Tejas Maria DO;  Location: [facility];   Service: Gastroenterology    IN ESOPHAGOSCOPY FLEXIBLE TRANSORAL WITH BIOPSY N/A 9/2/2016    Procedure: ESOPHAGOGASTRODUODENOSCOPY (EGD); ESOPHAGEAL DILATION; ESOPHAGEAL BIOPSY;  Surgeon: Dari Calvo MD;  Location: [facility];  Service: Thoracic    TONSILLECTOMY      UPPER GASTROINTESTINAL ENDOSCOPY      x 6       Social History     Social History    Marital status: /Civil Union     Spouse name: N/A    Number of children: N/A    Years of education: N/A     Social History Main Topics    Smoking status: Former Smoker    Smokeless tobacco: Never Used      Comment: pt refuses to answer question    Alcohol use No    Drug use: No    Sexual activity: Not Asked Other Topics Concern    None     Social History Narrative    None       Family History   Problem Relation Age of Onset    No Known Problems Mother     No Known Problems Father        Allergies   Allergen Reactions    Heparin Other (See Comments)     Other reaction(s): Blood Disorders  clot    Macrolides And Ketolides Other (See Comments)     Interacts with other meds he is taking         Current Outpatient Prescriptions:     acyclovir (ZOVIRAX) 400 MG tablet, Take 400 mg by mouth 2 (two) times a day, Disp: , Rfl:     allopurinol (ZYLOPRIM) 100 mg tablet, allopurinol 100 mg tablet, Disp: , Rfl:     ampicillin 2,000 mg in sodium chloride 0 9 % 100 mL IVPB, Infuse 2,000 mg into a venous catheter every 4 (four) hours for 20 days, Disp: 32783 mL, Rfl: 0    aspirin 81 MG tablet, Take 81 mg by mouth every other day Every other day , Disp: , Rfl:     benzonatate (TESSALON) 200 MG capsule, benzonatate 200 mg capsule, Disp: , Rfl:     citalopram (CeleXA) 40 mg tablet, Take 40 mg by mouth daily, Disp: , Rfl:     clopidogrel (PLAVIX) 75 mg tablet, clopidogrel 75 mg tablet, Disp: , Rfl:     fenofibrate (TRIGLIDE) 50 MG tablet, Take 134 mg by mouth daily  , Disp: , Rfl:     folic acid (FOLVITE) 1 mg tablet, Take 1 mg by mouth daily, Disp: , Rfl:     furosemide (LASIX) 40 mg tablet, furosemide 40 mg tablet, Disp: , Rfl:     gabapentin (NEURONTIN) 600 MG tablet, Take 600 mg by mouth 2 (two) times a day  , Disp: , Rfl:     glucose monitoring kit (FREESTYLE) monitoring kit, 1 each by Does not apply route as needed ( ) E 11 9, Disp: 1 each, Rfl: 0    Ibrutinib 140 MG TABS, Take 140 mg by mouth daily for 28 days, Disp: 30 tablet, Rfl: 1    insulin glargine (LANTUS) 100 units/mL subcutaneous injection, Inject 15 Units under the skin every morning (Patient taking differently: Inject 12 Units under the skin every morning  ), Disp: 10 mL, Rfl: 0    insulin lispro (HumaLOG) 100 units/mL injection, Inject 10 Units under the skin 3 (three) times a day with meals, Disp: , Rfl: 0    Lancets (FREESTYLE) lancets, Use as instructed--test 2 times a day  E 11 9, Disp: 100 each, Rfl: 0    multivitamin (THERAGRAN) TABS, Take 1 tablet by mouth daily, Disp: , Rfl:     pantoprazole (PROTONIX) 40 mg tablet, Take 40 mg by mouth daily  , Disp: , Rfl:     predniSONE 10 mg tablet, Take 10 mg by mouth daily, Disp: , Rfl:     sodium chloride, PF, 0 9 %, 10 mL by Intracatheter route see administration instructions 10mL into port before and after ampicillin doses, Disp: , Rfl: 0    VENTOLIN  (90 Base) MCG/ACT inhaler, INHALE 2 PUFFS 4 TIMES A DAY AS NEEDED, Disp: , Rfl: 3    obinutuzumab (GAZYVA) 1000 mg/40 mL SOLN, Infuse into a venous catheter, Disp: , Rfl:       Physical Exam:  /82 (BP Location: Right arm, Cuff Size: Adult)   Pulse 81   Temp 98 6 °F (37 °C) (Tympanic)   Resp 17   Ht 6' 1" (1 854 m)   Wt 101 kg (223 lb)   SpO2 97%   BMI 29 42 kg/m²     Physical Exam   Constitutional: He is oriented to person, place, and time  He appears well-developed and well-nourished  No distress  HENT:   Head: Normocephalic and atraumatic  Eyes: Conjunctivae are normal  No scleral icterus  Neck: Normal range of motion  Neck supple  Cardiovascular: Normal rate, regular rhythm and normal heart sounds  No murmur heard  Pulmonary/Chest: Effort normal and breath sounds normal  No respiratory distress  Port a cath present  IV antibiotics connected  Abdominal: Soft  There is no tenderness  Musculoskeletal: Normal range of motion  He exhibits no edema or tenderness  Lymphadenopathy:     He has no cervical adenopathy  Neurological: He is alert and oriented to person, place, and time  No cranial nerve deficit  Skin: Skin is warm and dry  Psychiatric: He has a normal mood and affect  Vitals reviewed        Labs:  Lab Results   Component Value Date    WBC 3 70 (L) 05/14/2018    HGB 14 3 05/14/2018    HCT 43 1 05/14/2018    MCV 89 05/14/2018    PLT 83 (L) 05/14/2018     Lab Results   Component Value Date     05/14/2018    K 4 2 05/14/2018     05/14/2018    CO2 26 05/14/2018    ANIONGAP 12 05/14/2018    BUN 14 05/14/2018    CREATININE 1 30 05/14/2018    GLUCOSE 116 05/14/2018    GLUF 118 (H) 04/24/2018    CALCIUM 9 4 05/14/2018    AST 30 05/14/2018    ALT 38 05/14/2018    ALKPHOS 49 05/14/2018    PROT 6 1 (L) 05/14/2018    BILITOT 0 90 05/14/2018    EGFR 58 05/14/2018       Patient voiced understanding and agreement in the above discussion  Aware to contact our office with questions/symptoms in the interim

## 2018-05-18 ENCOUNTER — TELEPHONE (OUTPATIENT)
Dept: ENDOCRINOLOGY | Facility: CLINIC | Age: 63
End: 2018-05-18

## 2018-05-21 ENCOUNTER — HOSPITAL ENCOUNTER (OUTPATIENT)
Dept: INFUSION CENTER | Facility: CLINIC | Age: 63
Discharge: HOME | End: 2018-05-21
Payer: MEDICARE

## 2018-05-21 DIAGNOSIS — C91.10 CHRONIC LYMPHOCYTIC LEUKEMIA (HCC): ICD-10-CM

## 2018-05-21 DIAGNOSIS — C91.10 CLL (CHRONIC LYMPHOCYTIC LEUKEMIA) (HCC): ICD-10-CM

## 2018-05-21 LAB
ALBUMIN SERPL BCP-MCNC: 3.9 G/DL (ref 3.5–5.7)
ALP SERPL-CCNC: 58 U/L (ref 46–116)
ALT SERPL W P-5'-P-CCNC: 26 U/L (ref 12–78)
ANION GAP SERPL CALCULATED.3IONS-SCNC: 11 MMOL/L (ref 4–13)
AST SERPL W P-5'-P-CCNC: 25 U/L (ref 5–45)
BASOPHILS # BLD AUTO: 0.04 THOUSAND/UL (ref 0–0.1)
BASOPHILS NFR MAR MANUAL: 1 % (ref 0–1)
BILIRUB DIRECT SERPL-MCNC: 0.13 MG/DL (ref 0–0.2)
BILIRUB SERPL-MCNC: 0.8 MG/DL (ref 0.2–1)
BUN SERPL-MCNC: 24 MG/DL (ref 5–25)
CALCIUM SERPL-MCNC: 9.4 MG/DL (ref 8.3–10.1)
CHLORIDE SERPL-SCNC: 106 MMOL/L (ref 98–108)
CO2 SERPL-SCNC: 27 MMOL/L (ref 21–32)
CREAT SERPL-MCNC: 1.5 MG/DL (ref 0.6–1.3)
EOSINOPHIL # BLD AUTO: 0.08 THOUSAND/UL (ref 0–0.61)
EOSINOPHIL NFR BLD MANUAL: 2 % (ref 0–6)
ERYTHROCYTE [DISTWIDTH] IN BLOOD BY AUTOMATED COUNT: 14.4 % (ref 11.6–15.1)
GFR SERPL CREATININE-BSD FRML MDRD: 49 ML/MIN/1.73SQ M
GLUCOSE SERPL-MCNC: 130 MG/DL (ref 65–140)
HCT VFR BLD AUTO: 41.6 % (ref 36.5–49.3)
HGB BLD-MCNC: 13.6 G/DL (ref 12–17)
IGG SERPL-MCNC: 76 MG/DL (ref 700–1600)
LDH SERPL-CCNC: 213 U/L (ref 81–234)
LG PLATELETS BLD QL SMEAR: PRESENT
LYMPHOCYTES # BLD AUTO: 0.97 THOUSAND/UL (ref 0.6–4.47)
LYMPHOCYTES # BLD AUTO: 23 % (ref 14–44)
MCH RBC QN AUTO: 28.9 PG (ref 26.8–34.3)
MCHC RBC AUTO-ENTMCNC: 32.6 G/DL (ref 31.4–37.4)
MCV RBC AUTO: 89 FL (ref 82–98)
MONOCYTES # BLD AUTO: 0.38 THOUSAND/UL (ref 0–1.22)
MONOCYTES NFR BLD AUTO: 9 % (ref 4–12)
NEUTS BAND NFR BLD MANUAL: 0 % (ref 0–8)
NEUTS SEG # BLD: 2.73 THOUSAND/UL (ref 1.81–6.82)
NEUTS SEG NFR BLD AUTO: 65 % (ref 43–75)
OVALOCYTES BLD QL SMEAR: PRESENT
PLATELET # BLD AUTO: 89 THOUSANDS/UL (ref 149–390)
PLATELET BLD QL SMEAR: ABNORMAL
PMV BLD AUTO: 8.8 FL (ref 8.9–12.7)
POTASSIUM SERPL-SCNC: 4 MMOL/L (ref 3.5–5.3)
PROT SERPL-MCNC: 6 G/DL (ref 6.4–8.2)
RBC # BLD AUTO: 4.69 MILLION/UL (ref 3.88–5.62)
RETICS # AUTO: NORMAL 10*3/UL (ref 14356–105094)
RETICS # CALC: 1.5 % (ref 0.37–1.87)
SODIUM SERPL-SCNC: 144 MMOL/L (ref 136–145)
TOTAL CELLS COUNTED SPEC: 100
URATE SERPL-MCNC: 5.4 MG/DL (ref 4.2–8)
WBC # BLD AUTO: 4.2 THOUSAND/UL (ref 4.31–10.16)
WBC NRBC COR # BLD: 4.2 THOUSAND/UL (ref 4.31–10.16)

## 2018-05-21 PROCEDURE — 82248 BILIRUBIN DIRECT: CPT

## 2018-05-21 PROCEDURE — 83615 LACTATE (LD) (LDH) ENZYME: CPT

## 2018-05-21 PROCEDURE — 82784 ASSAY IGA/IGD/IGG/IGM EACH: CPT

## 2018-05-21 PROCEDURE — 85027 COMPLETE CBC AUTOMATED: CPT

## 2018-05-21 PROCEDURE — 84550 ASSAY OF BLOOD/URIC ACID: CPT

## 2018-05-21 PROCEDURE — 85007 BL SMEAR W/DIFF WBC COUNT: CPT

## 2018-05-21 PROCEDURE — 80053 COMPREHEN METABOLIC PANEL: CPT

## 2018-05-21 PROCEDURE — 85045 AUTOMATED RETICULOCYTE COUNT: CPT

## 2018-05-21 NOTE — PROGRESS NOTES
Labs drawn from port-a-cath, port flushed per protocol  Patient has allergy to heparin    Pt declined AVS

## 2018-05-23 RX ORDER — SODIUM CHLORIDE 9 MG/ML
20 INJECTION, SOLUTION INTRAVENOUS CONTINUOUS
Status: DISCONTINUED | OUTPATIENT
Start: 2018-05-24 | End: 2018-05-27 | Stop reason: HOSPADM

## 2018-05-23 RX ORDER — ACETAMINOPHEN 325 MG/1
650 TABLET ORAL ONCE
Status: COMPLETED | OUTPATIENT
Start: 2018-05-24 | End: 2018-05-24

## 2018-05-24 ENCOUNTER — HOSPITAL ENCOUNTER (OUTPATIENT)
Dept: INFUSION CENTER | Facility: CLINIC | Age: 63
Discharge: HOME | End: 2018-05-24
Payer: MEDICARE

## 2018-05-24 VITALS
TEMPERATURE: 98.8 F | HEIGHT: 72 IN | WEIGHT: 224.87 LBS | SYSTOLIC BLOOD PRESSURE: 156 MMHG | HEART RATE: 80 BPM | DIASTOLIC BLOOD PRESSURE: 74 MMHG | RESPIRATION RATE: 18 BRPM | BODY MASS INDEX: 30.46 KG/M2

## 2018-05-24 PROCEDURE — 96367 TX/PROPH/DG ADDL SEQ IV INF: CPT

## 2018-05-24 PROCEDURE — 96415 CHEMO IV INFUSION ADDL HR: CPT

## 2018-05-24 PROCEDURE — 96413 CHEMO IV INFUSION 1 HR: CPT

## 2018-05-24 RX ADMIN — DEXAMETHASONE SODIUM PHOSPHATE 20 MG: 10 INJECTION, SOLUTION INTRAMUSCULAR; INTRAVENOUS at 09:51

## 2018-05-24 RX ADMIN — ACETAMINOPHEN 650 MG: 325 TABLET, FILM COATED ORAL at 09:15

## 2018-05-24 RX ADMIN — SODIUM CHLORIDE 20 ML/HR: 9 INJECTION, SOLUTION INTRAVENOUS at 09:11

## 2018-05-24 RX ADMIN — OBINUTUZUMAB 1000 MG: 1000 INJECTION, SOLUTION, CONCENTRATE INTRAVENOUS at 10:34

## 2018-05-24 RX ADMIN — DIPHENHYDRAMINE HYDROCHLORIDE 25 MG: 50 INJECTION, SOLUTION INTRAMUSCULAR; INTRAVENOUS at 09:12

## 2018-05-24 NOTE — PLAN OF CARE

## 2018-05-29 ENCOUNTER — HOSPITAL ENCOUNTER (OUTPATIENT)
Dept: INFUSION CENTER | Facility: CLINIC | Age: 63
Discharge: HOME | End: 2018-05-29

## 2018-05-29 ENCOUNTER — APPOINTMENT (OUTPATIENT)
Dept: LAB | Facility: CLINIC | Age: 63
End: 2018-05-29
Payer: MEDICARE

## 2018-05-29 ENCOUNTER — TELEPHONE (OUTPATIENT)
Dept: HEMATOLOGY ONCOLOGY | Facility: CLINIC | Age: 63
End: 2018-05-29

## 2018-05-29 ENCOUNTER — TRANSCRIBE ORDERS (OUTPATIENT)
Dept: LAB | Facility: CLINIC | Age: 63
End: 2018-05-29

## 2018-05-29 DIAGNOSIS — A32.9 LISTERIA INFECTION: ICD-10-CM

## 2018-05-29 PROCEDURE — 87040 BLOOD CULTURE FOR BACTERIA: CPT

## 2018-05-29 PROCEDURE — 36415 COLL VENOUS BLD VENIPUNCTURE: CPT

## 2018-05-29 NOTE — TELEPHONE ENCOUNTER
Rosalia Richmond from [facility] calling to try to help this patient  He went to a lab to have blood work done, and they told him there were no orders in [address] [place] [address] Rd  They are in as standing orders  She will call them back to let them know the orders are in the system from 5/14 and 5/18/18

## 2018-05-30 ENCOUNTER — OFFICE VISIT (OUTPATIENT)
Dept: INTERNAL MEDICINE CLINIC | Facility: CLINIC | Age: 63
End: 2018-05-30
Payer: MEDICARE

## 2018-05-30 ENCOUNTER — TELEPHONE (OUTPATIENT)
Dept: INTERNAL MEDICINE CLINIC | Facility: CLINIC | Age: 63
End: 2018-05-30

## 2018-05-30 VITALS
HEART RATE: 74 BPM | BODY MASS INDEX: 29.55 KG/M2 | SYSTOLIC BLOOD PRESSURE: 148 MMHG | OXYGEN SATURATION: 97 % | HEIGHT: 73 IN | DIASTOLIC BLOOD PRESSURE: 74 MMHG | WEIGHT: 223 LBS

## 2018-05-30 DIAGNOSIS — R63.4 WEIGHT LOSS: ICD-10-CM

## 2018-05-30 DIAGNOSIS — Z79.4 TYPE 2 DIABETES MELLITUS WITHOUT COMPLICATION, WITH LONG-TERM CURRENT USE OF INSULIN (HCC): ICD-10-CM

## 2018-05-30 DIAGNOSIS — N18.30 CKD (CHRONIC KIDNEY DISEASE) STAGE 3, GFR 30-59 ML/MIN (HCC): ICD-10-CM

## 2018-05-30 DIAGNOSIS — E11.9 TYPE 2 DIABETES MELLITUS WITHOUT COMPLICATION, WITH LONG-TERM CURRENT USE OF INSULIN (HCC): ICD-10-CM

## 2018-05-30 DIAGNOSIS — A32.9 LISTERIA INFECTION: ICD-10-CM

## 2018-05-30 DIAGNOSIS — I25.10 CORONARY ARTERY DISEASE INVOLVING NATIVE CORONARY ARTERY OF NATIVE HEART WITHOUT ANGINA PECTORIS: Primary | ICD-10-CM

## 2018-05-30 DIAGNOSIS — C91.10 CLL (CHRONIC LYMPHOCYTIC LEUKEMIA) (HCC): ICD-10-CM

## 2018-05-30 DIAGNOSIS — Z86.73 HISTORY OF TIA (TRANSIENT ISCHEMIC ATTACK): ICD-10-CM

## 2018-05-30 DIAGNOSIS — D69.6 THROMBOCYTOPENIA (HCC): ICD-10-CM

## 2018-05-30 PROCEDURE — 99215 OFFICE O/P EST HI 40 MIN: CPT | Performed by: INTERNAL MEDICINE

## 2018-05-30 NOTE — TELEPHONE ENCOUNTER
Pt called stating he was here this morning and just received a phone call from a cardiologist however he states he already has a cardiologist and wants to know what is going on? He asked if you can return his call at [phone]

## 2018-06-01 NOTE — PROGRESS NOTES
Assessment/Plan:    No problem-specific Assessment & Plan notes found for this encounter  Diagnoses and all orders for this visit:    Coronary artery disease involving native coronary artery of native heart without angina pectoris  -     Ambulatory referral to Cardiology; Future   patient currently is on Plavix and aspirin  Is currently not on any statin but takes fenofibrate  His last  triglycerides were within normal limits  I would touch base with his cardiologist  Regarding it  Weight loss  -     TSH, 3rd generation  -     T4, free; Future    CLL (chronic lymphocytic leukemia) ([facility] [address] )    As directed by Dr Domenico Yun  History of TIA (transient ischemic attack)   patient currently is taking  Aspirin and Plavix  Listeria bacteremia   see discussion above  Thrombocytopenia (HCC)   related to CLL  CKD (chronic kidney disease) stage 3, GFR 30-59 ml/min   follows up regularly with Nephrology team  Type 2 diabetes mellitus without complication, with long-term current use of insulin (HCC)      Blood sugars are excellent  Urine is very vigilant with his diet  He barely needs to take any insulin now  He does follow up with endocrinology in [facility]  Hypertension  Blood pressure is slightly on the higher side  We will watch for now  Subjective:      Patient ID: Caroline Lopez is a 60 y o  male  HPI   patient with history of CLL for over 18 years is here to establish care  Few months ago he had an episode of listeria bacteremia the source of which could not be traced  But fortunately did not have any vegetation and did finish his IV ampicillin  He had followed up with infection disease without any further concerns  Patient understands  his precautions with dairy products      The following portions of the patient's history were reviewed and updated as appropriate: allergies, current medications, past family history, past medical history, past social history, past surgical history and problem list     Review of Systems   Constitutional: Negative for appetite change, chills, fatigue, fever and unexpected weight change  HENT: Negative for congestion, postnasal drip and sore throat  Respiratory: Negative for cough and shortness of breath  Cardiovascular: Negative for chest pain, palpitations and leg swelling  Gastrointestinal: Negative for abdominal pain, diarrhea and nausea  Endocrine:        See discussion above   Genitourinary: Negative for difficulty urinating and hematuria  Musculoskeletal: Negative for myalgias  Skin: Positive for rash (Several  ecchymosis on his arms)  Neurological: Negative for dizziness  Hematological: Bruises/bleeds easily  Objective:      /74 (BP Location: Left arm, Patient Position: Sitting, Cuff Size: Standard)   Pulse 74   Ht 6' 1" (1 854 m)   Wt 101 kg (223 lb)   SpO2 97%   BMI 29 42 kg/m²          Physical Exam   Constitutional: He is oriented to person, place, and time  He appears well-nourished  No distress  Eyes: Conjunctivae are normal  No scleral icterus  Neck: No JVD present  No thyromegaly present  Cardiovascular: Normal rate, regular rhythm and normal heart sounds  No murmur heard  Pulmonary/Chest: Effort normal and breath sounds normal  No respiratory distress  He has no wheezes  He has no rales  Abdominal: Soft  Bowel sounds are normal  He exhibits no distension and no mass  There is no tenderness  There is no rebound and no guarding  Musculoskeletal: Normal range of motion  He exhibits no edema  Lymphadenopathy:     He has no cervical adenopathy  Neurological: He is alert and oriented to person, place, and time  No cranial nerve deficit  He exhibits normal muscle tone  Coordination normal    Skin: Rash (Several ecchymoses on his arms) noted  Psychiatric: He has a normal mood and affect   His behavior is normal

## 2018-06-03 LAB
BACTERIA BLD CULT: NORMAL
BACTERIA BLD CULT: NORMAL

## 2018-06-04 ENCOUNTER — HOSPITAL ENCOUNTER (OUTPATIENT)
Dept: INFUSION CENTER | Facility: CLINIC | Age: 63
Discharge: HOME | End: 2018-06-04
Payer: MEDICARE

## 2018-06-04 LAB
ALBUMIN SERPL BCP-MCNC: 3.6 G/DL (ref 3.5–5.7)
ALP SERPL-CCNC: 54 U/L (ref 46–116)
ALT SERPL W P-5'-P-CCNC: 20 U/L (ref 12–78)
ANION GAP SERPL CALCULATED.3IONS-SCNC: 9 MMOL/L (ref 4–13)
ANISOCYTOSIS BLD QL SMEAR: PRESENT
AST SERPL W P-5'-P-CCNC: 24 U/L (ref 5–45)
BASOPHILS # BLD AUTO: 0.13 THOUSAND/UL (ref 0–0.1)
BASOPHILS NFR MAR MANUAL: 3 % (ref 0–1)
BILIRUB SERPL-MCNC: 0.7 MG/DL (ref 0.2–1)
BUN SERPL-MCNC: 19 MG/DL (ref 5–25)
CALCIUM SERPL-MCNC: 8.8 MG/DL (ref 8.3–10.1)
CHLORIDE SERPL-SCNC: 107 MMOL/L (ref 98–108)
CO2 SERPL-SCNC: 26 MMOL/L (ref 21–32)
CREAT SERPL-MCNC: 1.2 MG/DL (ref 0.6–1.3)
EOSINOPHIL # BLD AUTO: 0.26 THOUSAND/UL (ref 0–0.61)
EOSINOPHIL NFR BLD MANUAL: 6 % (ref 0–6)
ERYTHROCYTE [DISTWIDTH] IN BLOOD BY AUTOMATED COUNT: 15.9 % (ref 11.6–15.1)
GFR SERPL CREATININE-BSD FRML MDRD: 64 ML/MIN/1.73SQ M
GLUCOSE SERPL-MCNC: 122 MG/DL (ref 65–140)
HCT VFR BLD AUTO: 41.7 % (ref 36.5–49.3)
HGB BLD-MCNC: 13.3 G/DL (ref 12–17)
LYMPHOCYTES # BLD AUTO: 0.77 THOUSAND/UL (ref 0.6–4.47)
LYMPHOCYTES # BLD AUTO: 18 % (ref 14–44)
MCH RBC QN AUTO: 28.9 PG (ref 26.8–34.3)
MCHC RBC AUTO-ENTMCNC: 32 G/DL (ref 31.4–37.4)
MCV RBC AUTO: 90 FL (ref 82–98)
MONOCYTES # BLD AUTO: 0.17 THOUSAND/UL (ref 0–1.22)
MONOCYTES NFR BLD AUTO: 4 % (ref 4–12)
NEUTS BAND NFR BLD MANUAL: 1 % (ref 0–8)
NEUTS SEG # BLD: 2.97 THOUSAND/UL (ref 1.81–6.82)
NEUTS SEG NFR BLD AUTO: 68 % (ref 43–75)
PLATELET # BLD AUTO: 51 THOUSANDS/UL (ref 149–390)
PLATELET BLD QL SMEAR: ABNORMAL
PMV BLD AUTO: 9.3 FL (ref 8.9–12.7)
POTASSIUM SERPL-SCNC: 3.7 MMOL/L (ref 3.5–5.3)
PROT SERPL-MCNC: 5.5 G/DL (ref 6.4–8.2)
RBC # BLD AUTO: 4.63 MILLION/UL (ref 3.88–5.62)
SODIUM SERPL-SCNC: 142 MMOL/L (ref 136–145)
TOTAL CELLS COUNTED SPEC: 100
WBC # BLD AUTO: 4.3 THOUSAND/UL (ref 4.31–10.16)
WBC NRBC COR # BLD: 4.3 THOUSAND/UL (ref 4.31–10.16)

## 2018-06-04 PROCEDURE — 85027 COMPLETE CBC AUTOMATED: CPT | Performed by: INTERNAL MEDICINE

## 2018-06-04 PROCEDURE — 85007 BL SMEAR W/DIFF WBC COUNT: CPT | Performed by: INTERNAL MEDICINE

## 2018-06-04 PROCEDURE — 80053 COMPREHEN METABOLIC PANEL: CPT | Performed by: INTERNAL MEDICINE

## 2018-06-08 ENCOUNTER — OFFICE VISIT (OUTPATIENT)
Dept: ENDOCRINOLOGY | Facility: CLINIC | Age: 63
End: 2018-06-08
Payer: MEDICARE

## 2018-06-08 VITALS
HEART RATE: 64 BPM | BODY MASS INDEX: 29.43 KG/M2 | WEIGHT: 222.1 LBS | DIASTOLIC BLOOD PRESSURE: 90 MMHG | HEIGHT: 73 IN | SYSTOLIC BLOOD PRESSURE: 140 MMHG

## 2018-06-08 DIAGNOSIS — E09.9 STEROID-INDUCED DIABETES (HCC): Primary | ICD-10-CM

## 2018-06-08 DIAGNOSIS — I10 HYPERTENSION, UNSPECIFIED TYPE: ICD-10-CM

## 2018-06-08 DIAGNOSIS — T38.0X5A STEROID-INDUCED DIABETES (HCC): Primary | ICD-10-CM

## 2018-06-08 DIAGNOSIS — C91.10 CHRONIC LYMPHOCYTIC LEUKEMIA (HCC): ICD-10-CM

## 2018-06-08 DIAGNOSIS — E78.01 FAMILIAL HYPERCHOLESTEROLEMIA: ICD-10-CM

## 2018-06-08 PROCEDURE — 99214 OFFICE O/P EST MOD 30 MIN: CPT | Performed by: INTERNAL MEDICINE

## 2018-06-08 NOTE — PROGRESS NOTES
Mu Chacon 60 y o  male MRN: [id number]    Encounter: [id number]      Assessment/Plan     Problem List Items Addressed This Visit     Hyperlipidemia     Continue fibrate          Hypertension     bp stable          Chronic lymphocytic leukemia ([facility] )    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[place] [address] ) - Primary     Sugars stable, continue current management - if steroid dose changed he will need adjustment in his regimen          Relevant Orders    HEMOGLOBIN A1C W/ EAG ESTIMATION Lab Collect        CC: Diabetes    History of Present Illness     HPI:  61 y/o gentleman with history of steroid induced diabetes here for follow up - sugars have significantly improved - he notices higher sugars after chemo and requires more insulin   Denies polyuria , polydipsia , blurry vision or numbness and tingling in feet  Review of Systems   Constitutional: Negative for fatigue and unexpected weight change  Eyes: Negative for visual disturbance  Respiratory: Negative for cough and shortness of breath  Cardiovascular: Negative for palpitations and leg swelling  Gastrointestinal: Negative for constipation, diarrhea, nausea and vomiting  Endocrine: Negative for polydipsia and polyuria  Musculoskeletal: Negative for gait problem  Skin: Negative for color change, pallor, rash and wound  Neurological: Negative for weakness and light-headedness  Psychiatric/Behavioral: Negative for sleep disturbance  All other systems reviewed and are negative        Historical Information   Past Medical History:   Diagnosis Date    CAD (coronary artery disease) 2004    Cellulitis of scalp     CKD (chronic kidney disease) stage 3, GFR 30-59 ml/min     CLL (chronic lymphocytic leukemia) ([place] [address] )     COPD (chronic obstructive pulmonary disease) ([facility] )     Diabetes mellitus ([place] [address] )     Dyslipidemia     H/O blood clots     Hemolytic anemia (HCC)     History of TIA (transient ischemic attack)     Hyperlipidemia     Hypertension     Multiple gastric ulcers     Myocardial infarction (HCC)     acute    Neutropenia (HCC)     Recurrent sinusitis     Thrombocytopenia (HCC)     Vertigo      Past Surgical History:   Procedure Laterality Date    ARTHROSCOPIC REPAIR ACL      lt knee    CARDIAC SURGERY      cardiac cath with stent    FOOT SURGERY      HAND SURGERY      KNEE ARTHROSCOPY      OTHER SURGICAL HISTORY      cardiac cath lesion 1, 1st adjunct treat device: stent    PORTACATH PLACEMENT      ME ESOPHAGOGASTRODUODENOSCOPY TRANSORAL DIAGNOSTIC N/A 10/5/2017    Procedure: ESOPHAGOGASTRODUODENOSCOPY (EGD) with bx;  Surgeon: Hi Jaeger DO;  Location: [facility]; Service: Gastroenterology    ME ESOPHAGOGASTRODUODENOSCOPY TRANSORAL DIAGNOSTIC N/A 3/8/2018    Procedure: ESOPHAGOGASTRODUODENOSCOPY (EGD) with biopsy;  Surgeon: Hi Jaeger DO;  Location: [facility];   Service: Gastroenterology    ME ESOPHAGOSCOPY FLEXIBLE TRANSORAL WITH BIOPSY N/A 9/2/2016    Procedure: ESOPHAGOGASTRODUODENOSCOPY (EGD); ESOPHAGEAL DILATION; ESOPHAGEAL BIOPSY;  Surgeon: Juventino Powell MD;  Location: [facility];  Service: Thoracic    TONSILLECTOMY      UPPER GASTROINTESTINAL ENDOSCOPY      x 6     Social History   History   Alcohol Use No     Comment: social use as per [employer]     History   Drug Use No     History   Smoking Status    Former Smoker   Smokeless Tobacco    Never Used     Comment: pt refuses to answer question; current every day smoker as per  [employer]     Family History:   Family History   Problem Relation Age of Onset    Leukemia Mother      chronic    Colon polyps Mother      sidmoid    No Known Problems Father        Meds/Allergies   Current Outpatient Prescriptions   Medication Sig Dispense Refill    acyclovir (ZOVIRAX) 400 MG tablet Take 400 mg by mouth 2 (two) times a day      allopurinol (ZYLOPRIM) 100 mg tablet allopurinol 100 mg tablet      aspirin 81 MG tablet Take 81 mg by mouth every other day Every other day       benzonatate (TESSALON) 200 MG capsule benzonatate 200 mg capsule      citalopram (CeleXA) 40 mg tablet Take 40 mg by mouth daily      clopidogrel (PLAVIX) 75 mg tablet clopidogrel 75 mg tablet      fenofibrate (TRIGLIDE) 50 MG tablet Take 134 mg by mouth daily        folic acid (FOLVITE) 1 mg tablet Take 1 mg by mouth daily      furosemide (LASIX) 40 mg tablet furosemide 40 mg tablet      gabapentin (NEURONTIN) 600 MG tablet Take 600 mg by mouth 2 (two) times a day        glucose monitoring kit (FREESTYLE) monitoring kit 1 each by Does not apply route as needed ( ) E 11 9 1 each 0    Ibrutinib 140 MG TABS Take 140 mg by mouth daily for 28 days 30 tablet 1    insulin lispro (HumaLOG) 100 units/mL injection Inject 10 Units under the skin 3 (three) times a day with meals  0    Lancets (FREESTYLE) lancets Use as instructed--test 2 times a day    E 11 9 100 each 0    multivitamin (THERAGRAN) TABS Take 1 tablet by mouth daily      obinutuzumab (GAZYVA) 1000 mg/40 mL SOLN Infuse into a venous catheter      pantoprazole (PROTONIX) 40 mg tablet Take 40 mg by mouth daily        predniSONE 10 mg tablet Take 10 mg by mouth daily      sodium chloride, PF, 0 9 % 10 mL by Intracatheter route see administration instructions 10mL into port before and after ampicillin doses  0    VENTOLIN  (90 Base) MCG/ACT inhaler INHALE 2 PUFFS 4 TIMES A DAY AS NEEDED  3     No current facility-administered medications for this visit  Allergies   Allergen Reactions    Heparin Other (See Comments)     Other reaction(s): Blood Disorders  clot    Macrolides And Ketolides Other (See Comments)     Interacts with other meds he is taking       Objective   Vitals: Blood pressure 140/90, pulse 64, height 6' 1" (1 854 m), weight 101 kg (222 lb 1 6 oz)  Physical Exam   Constitutional: He is oriented to person, place, and time  He appears well-developed and well-nourished  No distress     HENT:   Head: Normocephalic and atraumatic  Mouth/Throat: No oropharyngeal exudate  Eyes: Conjunctivae and EOM are normal  No scleral icterus  Neck: Normal range of motion  Neck supple  No thyromegaly present  Cardiovascular: Normal rate, regular rhythm and normal heart sounds  No murmur heard  Pulmonary/Chest: Effort normal and breath sounds normal  No respiratory distress  He has no wheezes  He has no rales  Abdominal: Soft  Bowel sounds are normal  He exhibits no distension  There is no tenderness  There is no rebound  Musculoskeletal: Normal range of motion  He exhibits no edema or deformity  Lymphadenopathy:     He has no cervical adenopathy  Neurological: He is alert and oriented to person, place, and time  Skin: Skin is warm and dry  No rash noted  No erythema  No pallor  Psychiatric: He has a normal mood and affect  His behavior is normal  Judgment and thought content normal        The history was obtained from the review of the chart, patient      Lab Results:   Lab Results   Component Value Date/Time    Hemoglobin A1C 5 1 02/12/2018 08:25 AM    Hemoglobin A1C 5 3 01/22/2018 08:55 AM    Hemoglobin A1C 7 3 (H) 09/19/2017 09:11 AM    WBC 4 30 (L) 06/04/2018 08:58 AM    WBC 4 70 05/29/2018 09:00 AM    WBC 4 20 (L) 05/21/2018 08:35 AM    Hemoglobin 13 3 06/04/2018 08:58 AM    Hemoglobin 14 5 05/29/2018 09:00 AM    Hemoglobin 13 6 05/21/2018 08:35 AM    Hematocrit 41 7 06/04/2018 08:58 AM    Hematocrit 45 6 05/29/2018 09:00 AM    Hematocrit 41 6 05/21/2018 08:35 AM    MCV 90 06/04/2018 08:58 AM    MCV 90 05/29/2018 09:00 AM    MCV 89 05/21/2018 08:35 AM    Platelets 51 (L) 42/83/1195 08:58 AM    Platelets 48 (LL) 69/08/5290 09:00 AM    Platelets 89 (L) 56/99/3771 08:35 AM    BUN 19 06/04/2018 08:58 AM    BUN 20 05/29/2018 09:00 AM    BUN 24 05/21/2018 08:35 AM    Sodium 142 06/04/2018 08:58 AM    Sodium 144 05/29/2018 09:00 AM    Sodium 144 05/21/2018 08:35 AM    Potassium 3 7 06/04/2018 08:58 AM    Potassium 3 9 05/29/2018 09:00 AM    Potassium 4 0 05/21/2018 08:35 AM    Chloride 107 06/04/2018 08:58 AM    Chloride 104 05/29/2018 09:00 AM    Chloride 106 05/21/2018 08:35 AM    CO2 26 06/04/2018 08:58 AM    CO2 28 05/29/2018 09:00 AM    CO2 27 05/21/2018 08:35 AM    Creatinine 1 20 06/04/2018 08:58 AM    Creatinine 1 30 05/29/2018 09:00 AM    Creatinine 1 50 (H) 05/21/2018 08:35 AM    AST 24 06/04/2018 08:58 AM    AST 23 05/29/2018 09:00 AM    AST 25 05/21/2018 08:35 AM    ALT 20 06/04/2018 08:58 AM    ALT 25 05/29/2018 09:00 AM    ALT 26 05/21/2018 08:35 AM    Albumin 3 6 06/04/2018 08:58 AM    Albumin 4 2 05/29/2018 09:00 AM    Albumin 3 9 05/21/2018 08:35 AM    Cholesterol 123 11/06/2017 08:37 AM    Cholesterol 138 06/19/2017 08:37 AM    HDL, Direct 29 (L) 11/06/2017 08:37 AM    HDL, Direct 23 (L) 06/19/2017 08:37 AM    Triglycerides 72 11/06/2017 08:37 AM    Triglycerides 136 06/19/2017 08:37 AM           Imaging Studies: I have personally reviewed pertinent reports  CT ABDOMEN AND PELVIS WITH IV CONTRAST  IMPRESSION:  1  Splenomegaly measuring 17 5 cm in craniocaudal dimension  No focal splenic lesion  2   Cholelithiasis without other evidence of acute cholecystitis  3   Small hiatal hernia with circumferential thickening of the distal esophagus  Correlate for evidence of reflux esophagitis and consider endoscopy if clinically warranted  4   Mild bowel wall thickening of a portion of the transverse colon may represent peristalsis versus focal colitis  Correlate clinically for evidence of colitis  Portions of the record may have been created with voice recognition software  Occasional wrong word or "sound a like" substitutions may have occurred due to the inherent limitations of voice recognition software  Read the chart carefully and recognize, using context, where substitutions have occurred

## 2018-06-10 PROBLEM — E09.9 STEROID-INDUCED DIABETES (HCC): Status: ACTIVE | Noted: 2018-06-10

## 2018-06-10 PROBLEM — T38.0X5A STEROID-INDUCED DIABETES (HCC): Status: ACTIVE | Noted: 2018-06-10

## 2018-06-10 NOTE — ASSESSMENT & PLAN NOTE
Sugars stable, continue current management - if steroid dose changed he will need adjustment in his regimen

## 2018-06-11 ENCOUNTER — LAB (OUTPATIENT)
Dept: LAB | Facility: CLINIC | Age: 63
End: 2018-06-11
Payer: MEDICARE

## 2018-06-11 ENCOUNTER — TRANSCRIBE ORDERS (OUTPATIENT)
Dept: LAB | Facility: CLINIC | Age: 63
End: 2018-06-11

## 2018-06-11 ENCOUNTER — TELEPHONE (OUTPATIENT)
Dept: GASTROENTEROLOGY | Facility: AMBULARY SURGERY CENTER | Age: 63
End: 2018-06-11

## 2018-06-11 DIAGNOSIS — E09.9 STEROID-INDUCED DIABETES (HCC): ICD-10-CM

## 2018-06-11 DIAGNOSIS — T38.0X5A STEROID-INDUCED DIABETES (HCC): ICD-10-CM

## 2018-06-11 DIAGNOSIS — C91.10 CLL (CHRONIC LYMPHOCYTIC LEUKEMIA) (HCC): Primary | ICD-10-CM

## 2018-06-11 LAB
EST. AVERAGE GLUCOSE BLD GHB EST-MCNC: 120 MG/DL
HBA1C MFR BLD: 5.8 % (ref 4.2–6.3)

## 2018-06-11 PROCEDURE — 83036 HEMOGLOBIN GLYCOSYLATED A1C: CPT

## 2018-06-11 PROCEDURE — 36415 COLL VENOUS BLD VENIPUNCTURE: CPT

## 2018-06-11 NOTE — TELEPHONE ENCOUNTER
Patient with hx of chronic espophageal ulcer w/stricture + dysphagia & currently receiving chemo for CLL  He called because his ulcer symptoms have gotten much worse over the past few days  He is starting to notice that food is getting stuck again, even soft foods like eggs  He currently takes Protonix 40/day & has been taking carafate and Zantac that he had at home and nothing is helping  We reviewed conservative measures which he adheres to for the most part  In Sept 2016, he had the area stretched by Dr Alverto Tovar and it helped him  Last time he saw Dr Trudi Cruz in Jan2018 he didn't have symptoms this bad just occasional dysphagia  He is wondering what his next step should be and what he can do for relief  Please advise      Pt wants to be called at [phone]

## 2018-06-11 NOTE — TELEPHONE ENCOUNTER
DR ABEBE'S PT    Pt called requesting advise for symptoms that he is having from his ulcers  Pt stated he is experiencing a flare up where he cant sleep at nights  This started over the weekend   Pt need advise

## 2018-06-12 ENCOUNTER — TELEPHONE (OUTPATIENT)
Dept: ENDOCRINOLOGY | Facility: CLINIC | Age: 63
End: 2018-06-12

## 2018-06-12 PROBLEM — K22.2 ESOPHAGEAL STRICTURE: Status: ACTIVE | Noted: 2018-06-12

## 2018-06-12 PROBLEM — K22.10 ULCER OF ESOPHAGUS WITHOUT BLEEDING: Status: ACTIVE | Noted: 2018-06-12

## 2018-06-12 PROBLEM — R13.10 DYSPHAGIA: Status: ACTIVE | Noted: 2018-06-12

## 2018-06-12 NOTE — TELEPHONE ENCOUNTER
Patient is open to making appt with Dr Jane Downs for EGD + Dilation  He is concerned that Dr Madison Vance may not want to do dilation once she is in there because he says she told him she wouldn't do it with the ulcer present? Not sure what he means by this  He would like to know if Dr Madison Vance would be willing to do the dilation this time or should he just go back to Dr Flower Estevez  As he is the physician that did the original procedure  Patient would prefer to stay with Dr Madison Vance if possible, as he is very fond of the care he has received from her

## 2018-06-12 NOTE — TELEPHONE ENCOUNTER
Please schedule this patient for an asap EGD + dilation as per Roselia Horowitz note below  Once patient is scheduled, please send task back to nursing so that I can follow up with Dr Manuel Zapata about the case  His number is [phone], it is ok to leave messages per patient

## 2018-06-12 NOTE — TELEPHONE ENCOUNTER
Please call the patient regarding his abnormal result   A1C 5 8 AT goal     Left this message for pt and asked cortes to call back if he has any further questions

## 2018-06-13 ENCOUNTER — TELEPHONE (OUTPATIENT)
Dept: GASTROENTEROLOGY | Facility: CLINIC | Age: 63
End: 2018-06-13

## 2018-06-15 ENCOUNTER — PREP FOR PROCEDURE (OUTPATIENT)
Dept: OTHER | Facility: HOSPITAL | Age: 63
End: 2018-06-15

## 2018-06-15 ENCOUNTER — TELEPHONE (OUTPATIENT)
Dept: HEMATOLOGY ONCOLOGY | Facility: HOSPITAL | Age: 63
End: 2018-06-15

## 2018-06-15 ENCOUNTER — PREP FOR PROCEDURE (OUTPATIENT)
Dept: GASTROENTEROLOGY | Facility: MEDICAL CENTER | Age: 63
End: 2018-06-15

## 2018-06-15 DIAGNOSIS — K21.9 GASTROESOPHAGEAL REFLUX DISEASE WITHOUT ESOPHAGITIS: Primary | ICD-10-CM

## 2018-06-15 NOTE — TELEPHONE ENCOUNTER
Tu Mccormack, DO  Marcos Cline RN             Yes please tell pt I can do egd with a dilation   No problem   I will put order in  Called and left detailed message for patient on his mobile number, that Dr Carmella Candelaria is able to do egd+ dilation and confirmed with him that appt is on 6/20/18  I left office number for him to call with any additional questions that he may have

## 2018-06-18 ENCOUNTER — APPOINTMENT (OUTPATIENT)
Dept: LAB | Facility: CLINIC | Age: 63
End: 2018-06-18
Payer: MEDICARE

## 2018-06-18 ENCOUNTER — OFFICE VISIT (OUTPATIENT)
Dept: HEMATOLOGY ONCOLOGY | Facility: CLINIC | Age: 63
End: 2018-06-18
Payer: MEDICARE

## 2018-06-18 VITALS
HEIGHT: 73 IN | TEMPERATURE: 98 F | HEART RATE: 74 BPM | BODY MASS INDEX: 29.26 KG/M2 | DIASTOLIC BLOOD PRESSURE: 88 MMHG | SYSTOLIC BLOOD PRESSURE: 148 MMHG | WEIGHT: 220.8 LBS | OXYGEN SATURATION: 98 % | RESPIRATION RATE: 18 BRPM

## 2018-06-18 DIAGNOSIS — D69.6 THROMBOCYTOPENIA (HCC): ICD-10-CM

## 2018-06-18 DIAGNOSIS — C91.10 CLL (CHRONIC LYMPHOCYTIC LEUKEMIA) (HCC): ICD-10-CM

## 2018-06-18 DIAGNOSIS — K21.9 GASTROESOPHAGEAL REFLUX DISEASE WITHOUT ESOPHAGITIS: Primary | ICD-10-CM

## 2018-06-18 DIAGNOSIS — D59.19 OTHER AUTOIMMUNE HEMOLYTIC ANEMIAS: ICD-10-CM

## 2018-06-18 DIAGNOSIS — D75.82 HIT (HEPARIN-INDUCED THROMBOCYTOPENIA) (HCC): ICD-10-CM

## 2018-06-18 PROCEDURE — 99214 OFFICE O/P EST MOD 30 MIN: CPT | Performed by: PHYSICIAN ASSISTANT

## 2018-06-18 RX ORDER — SODIUM CHLORIDE 9 MG/ML
20 INJECTION, SOLUTION INTRAVENOUS CONTINUOUS
Status: DISCONTINUED | OUTPATIENT
Start: 2018-06-19 | End: 2018-06-22 | Stop reason: HOSPADM

## 2018-06-18 RX ORDER — ACETAMINOPHEN 325 MG/1
650 TABLET ORAL ONCE
Status: COMPLETED | OUTPATIENT
Start: 2018-06-19 | End: 2018-06-19

## 2018-06-18 RX ORDER — PANTOPRAZOLE SODIUM 40 MG/1
40 TABLET, DELAYED RELEASE ORAL DAILY
Qty: 30 TABLET | Refills: 0 | Status: ON HOLD | OUTPATIENT
Start: 2018-06-18 | End: 2018-10-18

## 2018-06-18 NOTE — PROGRESS NOTES
Hematology/Oncology Outpatient Follow-up  Anamaria Santos 60 y o  male 1955 [id number]    Date:  6/18/2018      Assessment and Plan:  1  CLL (chronic lymphocytic leukemia) ([facility] )  Patient is on treatment with Ibrutinib 140 mg PO daily and Gazayva 1000 mg every 4 weeks  He continues to tolerate well  Hemoglobin remains stable  Platelets are also stable  - CBC and differential; Standing  - Comprehensive metabolic panel; Standing  - LD,Blood; Standing  - Uric acid; Standing  - IgG; Standing  - Reticulocyte Count with Retic HGB; Standing  - CBC and differential  - Comprehensive metabolic panel  - LD,Blood  - Uric acid  - IgG  - Reticulocyte Count with Retic HGB    2  Other autoimmune hemolytic anemias (HCC)  Hemoglobin remains stable  He remains on 10 mg of prednisone at this time  No decrease at this time as hemoglobin continues to be stable  3  Thrombocytopenia ([facility] )  Secondary to chemotherapy  This remains stable  4  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)  Patient is aware of this  No heparin products  5  Gastroesophageal reflux disease without esophagitis  Patient has been having worsening symptoms over the past 2 weeks  He has not had coffee in 2 weeks, which is significant for patient  He has been taking Carafate and PPI with minimal benefit  He is having EGD with Dr Malcolm Macedo on 6/20/18      - pantoprazole (PROTONIX) 40 mg tablet; Take 1 tablet (40 mg total) by mouth daily  Dispense: 30 tablet; Refill: 0    HPI:  On 3/20/17 patient found to have hemoglobin of 6 2, ,000  He was admitted to [facility] for blood transfusion and plan to transfer to [address] [place] [address]  On 3/20/17 WBC count 195,000, hemoglobin 4 9, platelet count 65  Direct coomb's was positive with undetectable haptoglobin  He was given 2 units of PRBCs, Solu-Medrol 1 g ×3 days and IVIG 1 g/kg daily ×3 days  His hemoglobin continued to drop   Bone marrow biopsy on 3/21 showed marrow cellularity of greater than 95% almost replaced by small lymphocytes, lambda light chain restricted, CD20 positive, CD19 positive, CD23 positive partially expressing CD11c and CD25 and CD5 positive and negative for CD 79 and CD38  He was treated with single dose of chlorambucil to control his CLL   3/22 second dose of chlorambucil, also Rituxan 375 mg/M ² autoimmune hemolytic anemia  WBC continued to rise, 266,000  Patient initiated with Venetoclax 20 mg daily  Tumor lysis monitored every 8 hours; given aggressive hydration and Lasix and remained euvolemic  3/24-WBC dropped to 112,000  3/25- WBC 63,000  3/26-WBC 15,000, , platelet count 84,294  Patient became febrile, 101 3  The cefepime initiated Venetoclax held  3/28-patient fell from bed, CT head negative  ANC 1200, cefepime discontinued and Levaquin 75 mg daily started sliding 3/29 given second dose of rituximab 375 mg/m ²   3/30-WBC meg to 14 5 thousand, platelets 08,666, Venetoclax restarted 10 mg every other day  3/31 WBC 4 4, , platelet count 58,908  4/1-4/4: WBC maintained less than 10,000, ANC and platelet count meg  He was discharged on Venetoclax 10 mg every other day  He was instructed to discontinue simvastatin, Ranexa, aspirin, metoprolol 50 mg q  day, losartan 50 mg q  day, Plavix 75 mg q  day, folic acid 267 µg b i d , prednisone 60 mg, allopurinol  He was advised to continue Levaquin 750 mg daily for 10 days, Lexapro 10 mg daily, fenofibrate 50 mg daily, gabapentin 100 mg twice daily, Protonix 40 mg daily     Imaging studies performed at [facility]:  Patient had echocardiogram while inpatient at [facility] on 3/21  This study was unremarkable  CT chest abdomen pelvis without contrast on 3/24 showed stable pulmonary nodules, patchy groundglass densities of lower lobes previously identified and which may represent volume loss or early infiltrate   Persistent diffuse bronchial wall thickening suggesting acute/chronic bronchitis changes  No bronchiectasis  No masses  Stable lymphadenopathy of mediastinum  Stable axillary lymphadenopathy  Splenomegaly 23 9 cm  Extensive lymphadenopathy throughout the entire retroperitoneal, small bowel mesentery, and pelvis  Largest lymph node in right lower quadrant measured 4 6 x 4 8  Stable enlarged left para-aortic lymph node measuring 6 2 x 6 8   4/12/17: Patient received one dose of Rituxan on 4/7/17  Venetoclax 10 mg every other day 4/5/17 - 4/9/17  Venetoclax 10 mg every day beginning 4/10/17  Monitoring CBC 3 times per week  4/28/17: patient had follow-up appointment at [facility] with Dr Tushar Barron  She recommended to slowly increase dose of veneteclax  Also, she recommended as he has had numerous treatments with Rituxan, if antibody directed therapy becomes indicated in the future recommendation was with [facility]  She will be seeing her on a p r n  Basis      July 2017- Hemolysis  Disease not under control with veneteclax  Started prednisone 60 mg daily, folic acid, IBRUTINIB 104 mg daily and Gazyva       Sept 2017- 9/18-9/20 patient was admitted due to uncontrolled hyperglycemia with new onset DM type II due to chronic prednisone use for CLL/hemolytic anemia; also found to have profound neutropenia  Ibrutinib dose was reduced to 280 mg daily      October 2017- 10/7/17 - 10/14/17 patient was admitted due to cellulitis on his scalp     Dec 2017- Asya Daniel decreased to 140 mg PO daily due to thrombocytopenia      4/23 - 4/27 patient was admitted due to listeria bacteremia  He was discharged on IV ampicillin  Echo negative for vegetations  [facility] and [facility] was on hold at that time  Repeat CBC on 5/14/18 showed normal ANC, and hemoglobin  Platelets adequate at 83K        ROS: Review of Systems   Constitutional: Negative for appetite change, chills, fever and unexpected weight change  HENT: Negative for mouth sores and nosebleeds      Respiratory: Negative for cough, chest tightness, shortness of breath and wheezing  Cardiovascular: Negative for chest pain, palpitations and leg swelling  Gastrointestinal: Negative for abdominal pain, blood in stool, constipation, diarrhea, nausea and vomiting  Acid reflux, dysphagia    Genitourinary: Negative for difficulty urinating, dysuria and hematuria  Musculoskeletal: Negative for arthralgias, joint swelling and myalgias  Skin: Negative  Neurological: Negative for dizziness, weakness, light-headedness, numbness and headaches  Hematological: Negative  Psychiatric/Behavioral: Negative  Past Medical History:   Diagnosis Date    CAD (coronary artery disease) 2004    Cellulitis of scalp     CKD (chronic kidney disease) stage 3, GFR 30-59 ml/min     [facility]LL [facility] )     COPD (chronic obstructive pulmonary disease) ([facility] )     [facility]     Dyslipidemia     H/O blood clots     Hemolytic anemia (HCC)     History of TIA (transient ischemic attack)     Hyperlipidemia     Hypertension     Multiple gastric ulcers     Myocardial infarction (HCC)     acute    Neutropenia (HCC)     Recurrent sinusitis     Thrombocytopenia (HCC)     Vertigo        Past Surgical History:   Procedure Laterality Date    ARTHROSCOPIC REPAIR ACL      lt knee    CARDIAC SURGERY      cardiac cath with stent    FOOT SURGERY      HAND SURGERY      KNEE ARTHROSCOPY      OTHER SURGICAL HISTORY      cardiac cath lesion 1, 1st adjunct treat device: stent    PORTACATH PLACEMENT      SC ESOPHAGOGASTRODUODENOSCOPY TRANSORAL DIAGNOSTIC N/A 10/5/2017    Procedure: ESOPHAGOGASTRODUODENOSCOPY (EGD) with bx;  Surgeon: Hi Jaeger DO;  [facility]; Service: Gastroenterology    SC ESOPHAGOGASTRODUODENOSCOPY TRANSORAL DIAGNOSTIC N/A 3/8/2018    Procedure: ESOPHAGOGASTRODUODENOSCOPY (EGD) with biopsy;  Surgeon: Hi Jaeger DO;  [facility];   Service: Gastroenterology    SC ESOPHAGOSCOPY FLEXIBLE TRANSORAL WITH BIOPSY N/A 9/2/2016    Procedure: ESOPHAGOGASTRODUODENOSCOPY (EGD); ESOPHAGEAL DILATION; ESOPHAGEAL BIOPSY;  Surgeon: Edyta Nair MD;  [facility];  Service: Thoracic    TONSILLECTOMY      UPPER GASTROINTESTINAL ENDOSCOPY      x 6       Social History     Social History    Marital status: /Civil Union     Spouse name: N/A    Number of children: N/A    Years of education: N/A     Social History Main Topics    Smoking status: Former Smoker    Smokeless tobacco: Never Used      Comment: pt refuses to answer question; current every day smoker as per  Allscripts    Alcohol use No      Comment: social use as per Allscripts    Drug use: No    Sexual activity: Not on file     Other Topics Concern    Not on file     Social History Narrative    No narrative on file       Family History   Problem Relation Age of Onset    Leukemia Mother         chronic    Colon polyps Mother         sidmoid    No Known Problems Father        Allergies   Allergen Reactions    Heparin Other (See Comments)     Other reaction(s): Blood Disorders  clot    Macrolides And Ketolides Other (See Comments)     Interacts with other meds he is taking         Current Outpatient Prescriptions:     acyclovir (ZOVIRAX) 400 MG tablet, Take 400 mg by mouth 2 (two) times a day, Disp: , Rfl:     allopurinol (ZYLOPRIM) 100 mg tablet, allopurinol 100 mg tablet, Disp: , Rfl:     aspirin 81 MG tablet, Take 81 mg by mouth every other day Every other day , Disp: , Rfl:     benzonatate (TESSALON) 200 MG capsule, benzonatate 200 mg capsule, Disp: , Rfl:     citalopram (CeleXA) 40 mg tablet, Take 40 mg by mouth daily, Disp: , Rfl:     clopidogrel (PLAVIX) 75 mg tablet, clopidogrel 75 mg tablet, Disp: , Rfl:     fenofibrate (TRIGLIDE) 50 MG tablet, Take 134 mg by mouth daily  , Disp: , Rfl:     folic acid (FOLVITE) 1 mg tablet, Take 1 mg by mouth daily, Disp: , Rfl:     furosemide (LASIX) 40 mg tablet, furosemide 40 mg tablet, Disp: , Rfl:     gabapentin (NEURONTIN) 600 MG tablet, Take 600 mg by mouth 2 (two) times a day  , Disp: , Rfl:     glucose monitoring kit (FREESTYLE) monitoring kit, 1 each by Does not apply route as needed ( ) E 11 9, Disp: 1 each, Rfl: 0    insulin lispro (HumaLOG) 100 units/mL injection, Inject 10 Units under the skin 3 (three) times a day with meals, Disp: , Rfl: 0    Lancets (FREESTYLE) lancets, Use as instructed--test 2 times a day  E 11 9, Disp: 100 each, Rfl: 0    multivitamin (THERAGRAN) TABS, Take 1 tablet by mouth daily, Disp: , Rfl:     obinutuzumab (GAZYVA) 1000 mg/40 mL SOLN, Infuse into a venous catheter, Disp: , Rfl:     pantoprazole (PROTONIX) 40 mg tablet, Take 40 mg by mouth daily  , Disp: , Rfl:     predniSONE 10 mg tablet, Take 10 mg by mouth daily, Disp: , Rfl:     sodium chloride, PF, 0 9 %, 10 mL by Intracatheter route see administration instructions 10mL into port before and after ampicillin doses, Disp: , Rfl: 0    VENTOLIN  (90 Base) MCG/ACT inhaler, INHALE 2 PUFFS 4 TIMES A DAY AS NEEDED, Disp: , Rfl: 3    Ibrutinib 140 MG TABS, Take 140 mg by mouth daily for 28 days, Disp: 30 tablet, Rfl: 1      Physical Exam:  /88 (BP Location: Left arm, Patient Position: Sitting, Cuff Size: Adult)   Pulse 74   Temp 98 °F (36 7 °C)   Resp 18   Ht 6' 1" (1 854 m)   Wt 100 kg (220 lb 12 8 oz)   SpO2 98%   BMI 29 13 kg/m²     Physical Exam   Constitutional: He is oriented to person, place, and time  He appears well-developed and well-nourished  No distress  HENT:   Head: Normocephalic and atraumatic  Eyes: Conjunctivae are normal  No scleral icterus  Neck: Normal range of motion  Neck supple  Cardiovascular: Normal rate, regular rhythm and normal heart sounds  No murmur heard  Pulmonary/Chest: Effort normal and breath sounds normal  No respiratory distress  Abdominal: Soft  There is no tenderness     Musculoskeletal: Normal range of motion  He exhibits no edema or tenderness  Lymphadenopathy:     He has no cervical adenopathy  Neurological: He is alert and oriented to person, place, and time  No cranial nerve deficit  Skin: Skin is warm and dry  Psychiatric: He has a normal mood and affect  Vitals reviewed  Labs:  Lab Results   Component Value Date    WBC 4 80 06/11/2018    HGB 14 5 06/11/2018    HCT 44 7 06/11/2018    MCV 90 06/11/2018    PLT 54 (L) 06/11/2018     Lab Results   Component Value Date     06/18/2018    K 4 1 06/18/2018     (H) 06/18/2018    CO2 31 06/18/2018    ANIONGAP 4 06/18/2018    BUN 16 06/18/2018    CREATININE 1 10 06/18/2018    GLUCOSE 105 06/18/2018    GLUF 118 (H) 04/24/2018    CALCIUM 9 0 06/18/2018    AST 28 06/18/2018    ALT 27 06/18/2018    ALKPHOS 54 06/18/2018    PROT 5 8 (L) 06/18/2018    BILITOT 0 60 06/18/2018    EGFR 71 06/18/2018     @RESULTFAST(TSH)@    Patient voiced understanding and agreement in the above discussion  Aware to contact our office with questions/symptoms in the interim

## 2018-06-19 ENCOUNTER — HOSPITAL ENCOUNTER (OUTPATIENT)
Dept: INFUSION CENTER | Facility: CLINIC | Age: 63
Discharge: HOME | End: 2018-06-19
Payer: MEDICARE

## 2018-06-19 VITALS
SYSTOLIC BLOOD PRESSURE: 156 MMHG | HEART RATE: 90 BPM | RESPIRATION RATE: 16 BRPM | TEMPERATURE: 98.4 F | BODY MASS INDEX: 29.38 KG/M2 | DIASTOLIC BLOOD PRESSURE: 92 MMHG | WEIGHT: 222.66 LBS

## 2018-06-19 PROCEDURE — 96367 TX/PROPH/DG ADDL SEQ IV INF: CPT

## 2018-06-19 PROCEDURE — 96415 CHEMO IV INFUSION ADDL HR: CPT

## 2018-06-19 PROCEDURE — 96413 CHEMO IV INFUSION 1 HR: CPT

## 2018-06-19 RX ADMIN — OBINUTUZUMAB 1000 MG: 1000 INJECTION, SOLUTION, CONCENTRATE INTRAVENOUS at 10:08

## 2018-06-19 RX ADMIN — SODIUM CHLORIDE 20 ML/HR: 9 INJECTION, SOLUTION INTRAVENOUS at 08:58

## 2018-06-19 RX ADMIN — ACETAMINOPHEN 650 MG: 325 TABLET, FILM COATED ORAL at 09:26

## 2018-06-19 RX ADMIN — DIPHENHYDRAMINE HYDROCHLORIDE 25 MG: 50 INJECTION, SOLUTION INTRAMUSCULAR; INTRAVENOUS at 09:24

## 2018-06-19 RX ADMIN — DEXAMETHASONE SODIUM PHOSPHATE 20 MG: 10 INJECTION, SOLUTION INTRAMUSCULAR; INTRAVENOUS at 08:59

## 2018-06-19 NOTE — PROGRESS NOTES
Patient arrived on unit  Denies any discomforts  Cleared for Ciara Reins today   Premedication initiated

## 2018-06-19 NOTE — PLAN OF CARE
Problem: Potential for Falls  Goal: Patient will remain free of falls  INTERVENTIONS:  - Assess patient frequently for physical needs  -  Identify cognitive and physical deficits and behaviors that affect risk of falls    -  [place] fall precautions as indicated by assessment   - Educate patient/family on patient safety including physical limitations  - Instruct patient to call for assistance with activity based on assessment  - Modify environment to reduce risk of injury  - Consider OT/PT consult to assist with strengthening/mobility   Outcome: Progressing

## 2018-06-20 ENCOUNTER — HOSPITAL ENCOUNTER (OUTPATIENT)
Facility: HOSPITAL | Age: 63
Setting detail: OUTPATIENT SURGERY
Discharge: HOME | End: 2018-06-20
Attending: INTERNAL MEDICINE | Admitting: INTERNAL MEDICINE
Payer: MEDICARE

## 2018-06-20 ENCOUNTER — ANESTHESIA (OUTPATIENT)
Dept: GASTROENTEROLOGY | Facility: HOSPITAL | Age: 63
End: 2018-06-20
Payer: MEDICARE

## 2018-06-20 ENCOUNTER — ANESTHESIA EVENT (OUTPATIENT)
Dept: GASTROENTEROLOGY | Facility: HOSPITAL | Age: 63
End: 2018-06-20
Payer: MEDICARE

## 2018-06-20 VITALS
BODY MASS INDEX: 29.16 KG/M2 | DIASTOLIC BLOOD PRESSURE: 77 MMHG | HEART RATE: 80 BPM | WEIGHT: 220 LBS | HEIGHT: 73 IN | SYSTOLIC BLOOD PRESSURE: 137 MMHG | RESPIRATION RATE: 16 BRPM | OXYGEN SATURATION: 98 % | TEMPERATURE: 97.7 F

## 2018-06-20 PROCEDURE — 43248 EGD GUIDE WIRE INSERTION: CPT | Performed by: INTERNAL MEDICINE

## 2018-06-20 PROCEDURE — C1726 CATH, BAL DIL, NON-VASCULAR: HCPCS | Performed by: INTERNAL MEDICINE

## 2018-06-20 RX ORDER — SODIUM CHLORIDE 9 MG/ML
125 INJECTION, SOLUTION INTRAVENOUS CONTINUOUS
Status: DISCONTINUED | OUTPATIENT
Start: 2018-06-20 | End: 2018-06-20 | Stop reason: HOSPADM

## 2018-06-20 RX ORDER — PROPOFOL 10 MG/ML
INJECTION, EMULSION INTRAVENOUS AS NEEDED
Status: DISCONTINUED | OUTPATIENT
Start: 2018-06-20 | End: 2018-06-20 | Stop reason: SURG

## 2018-06-20 RX ADMIN — SODIUM CHLORIDE: 9 INJECTION, SOLUTION INTRAVENOUS at 07:15

## 2018-06-20 RX ADMIN — PROPOFOL 150 MG: 10 INJECTION, EMULSION INTRAVENOUS at 08:48

## 2018-06-20 RX ADMIN — PROPOFOL 40 MG: 10 INJECTION, EMULSION INTRAVENOUS at 08:53

## 2018-06-20 RX ADMIN — LIDOCAINE HYDROCHLORIDE 100 MG: 20 INJECTION, SOLUTION INTRAVENOUS at 08:48

## 2018-06-20 RX ADMIN — PROPOFOL 40 MG: 10 INJECTION, EMULSION INTRAVENOUS at 08:56

## 2018-06-20 RX ADMIN — SODIUM CHLORIDE 125 ML/HR: 0.9 INJECTION, SOLUTION INTRAVENOUS at 08:24

## 2018-06-20 RX ADMIN — PROPOFOL 40 MG: 10 INJECTION, EMULSION INTRAVENOUS at 08:50

## 2018-06-20 NOTE — ANESTHESIA POSTPROCEDURE EVALUATION
Post-Op Assessment Note      CV Status:  Stable    Mental Status:  Alert and awake    Hydration Status:  Euvolemic    PONV Controlled:  Controlled    Airway Patency:  Patent and adequate    Post Op Vitals Reviewed: Yes          Staff: CRNA           BP   111/72   Temp      Pulse  71   Resp      SpO2   98%

## 2018-06-20 NOTE — OP NOTE
**** GI/ENDOSCOPY REPORT ****     PATIENT NAME: CRISTIAN METZGER - VISIT ID:  Patient ID: [id number]   YOB: 1955     INTRODUCTION: Esophagogastroduodenoscopy - A 60 male patient presents for   an outpatient Esophag[facility]astroduodenoscopy at [facility]  INDICATIONS: Dysphagia  CONSENT: The benefits, risks, and alternatives to the procedure were   discussed and informed consent was obtained from the patient  PREPARATION:  EKG, pulse, pulse oximetry and blood pressure were monitored   throughout the procedure  MEDICATIONS: Anesthesia-check records     PROCEDURE:  The endoscope was passed without difficulty through the mouth   under direct visualization and advanced to the 2nd portion of the   duodenum  The scope was withdrawn and the mucosa was carefully examined  FINDINGS:   Esophagus: Narrowing at the GE junction without stricture  Dilatation was performed with a 18-19-20 mm dilator was passed  The   largest dilator measured 18 mm  Stomach: The stomach appeared to be   normal except for a hiatal hernia  Duodenum: The 1st portion of the   duodenum and 2nd portion of the duodenum appeared to be normal      COMPLICATIONS: There were no complications  IMPRESSIONS: Narrowing at the GE junction  Dilatation was performed  Normal stomach except for hiatal hernia  Normal 1st portion of the   duodenum and 2nd portion of the duodenum  RECOMMENDATIONS: Can repeat dilation if needed  ESTIMATED BLOOD LOSS:     PATHOLOGY SPECIMENS:     PROCEDURE CODES:     ICD-9 Codes: 787 2 DYSPHAGIA     ICD-10 Codes: R13 10 Dysphagia, unspecified     PERFORMED BY: DENISE Dao  on 06/20/2018  Version 1, electronically signed by DENISE Tomlinson  on   06/20/2018 at 09:23

## 2018-06-20 NOTE — H&P
History and Physical - [facility]  Bar Reyes 60 y o  male MRN: [id number]                  HPI: Bar Reyes is a 60y o  year old male who presents for EGD with dilation due to dysphagia  REVIEW OF SYSTEMS: Per the HPI, and otherwise unremarkable  Historical Information   Past Medical History:   Diagnosis Date    CAD (coronary artery disease) 2004    Cellulitis of scalp     CKD (chronic kidney disease) stage 3, GFR 30-59 ml/min     [facility]     COPD (chronic obstructive pulmonary disease) ([facility] )     Diabetes mellitus ([facility] )     Dyslipidemia     H/O blood clots     Hemolytic anemia (HCC)     History of TIA (transient ischemic attack)     Hyperlipidemia     Hypertension     Multiple gastric ulcers     Myocardial infarction (HCC)     acute    Neutropenia (HCC)     Recurrent sinusitis     Sleep apnea     Thrombocytopenia (HCC)     Vertigo      Past Surgical History:   Procedure Laterality Date    ARTHROSCOPIC REPAIR ACL      lt knee    CARDIAC SURGERY      cardiac cath with stent    FOOT SURGERY      HAND SURGERY      KNEE ARTHROSCOPY      OTHER SURGICAL HISTORY      cardiac cath lesion 1, 1st adjunct treat device: stent    PORTACATH PLACEMENT      SD ESOPHAGOGASTRODUODENOSCOPY TRANSORAL DIAGNOSTIC N/A 10/5/2017    Procedure: ESOPHAGOGASTRODUODENOSCOPY (EGD) with bx;  Surgeon: Samia Huff DO;  [facility]: [facility]; Service: Gastroenterology    SD ESOPHAGOGASTRODUODENOSCOPY TRANSORAL DIAGNOSTIC N/A 3/8/2018    Procedure: ESOPHAGOGASTRODUODENOSCOPY (EGD) with biopsy;  Surgeon: Samia Huff DO;  [facility];   Service: Gastroenterology    SD ESOPHAGOSCOPY FLEXIBLE TRANSORAL WITH BIOPSY N/A 9/2/2016    Procedure: ESOPHAGOGASTRODUODENOSCOPY (EGD); ESOPHAGEAL DILATION; ESOPHAGEAL BIOPSY;  Surgeon: Guilherme Kauffman MD;  [facility];  Service: Thoracic    TONSILLECTOMY      UPPER GASTROINTESTINAL ENDOSCOPY      x 6     Social History   History   Alcohol Use No     Comment: social use as per All[employer]     History   Drug Use No     History   Smoking Status    Former Smoker   Smokeless Tobacco    Never Used     Comment: pt refuses to answer question; current every day smoker as per  Allscripts     Family History   Problem Relation Age of Onset    Leukemia Mother         chronic    Colon polyps Mother         sidmoid    No Known Problems Father        Meds/Allergies     Prescriptions Prior to Admission   Medication    acyclovir (ZOVIRAX) 400 MG tablet    aspirin 81 MG tablet    citalopram (CeleXA) 40 mg tablet    fenofibrate (TRIGLIDE) 50 MG tablet    folic acid (FOLVITE) 1 mg tablet    furosemide (LASIX) 40 mg tablet    gabapentin (NEURONTIN) 600 MG tablet    Ibrutinib 140 MG TABS    insulin lispro (HumaLOG) 100 units/mL injection    multivitamin (THERAGRAN) TABS    pantoprazole (PROTONIX) 40 mg tablet    predniSONE 10 mg tablet    glucose monitoring kit (FREESTYLE) monitoring kit    Lancets (FREESTYLE) lancets    obinutuzumab (GAZYVA) 1000 mg/40 mL SOLN    sodium chloride, PF, 0 9 %    VENTOLIN  (90 Base) MCG/ACT inhaler       Allergies   Allergen Reactions    Heparin Other (See Comments)     Other reaction(s): Blood Disorders  clot    Macrolides And Ketolides Other (See Comments)     Interacts with other meds he is taking       Objective     Blood pressure 157/79, pulse 67, temperature 97 7 °F (36 5 °C), temperature source Temporal, resp  rate 16, height 6' 1" (1 854 m), weight 99 8 kg (220 lb), SpO2 98 %  PHYSICAL EXAM    Gen: NAD  CV: RRR  CHEST: Clear  ABD: soft, NT/ND  EXT: no edema      ASSESSMENT/PLAN:  This is a 60y o  year old male here for EGD with dilation due to dysphagia      PLAN: EGD

## 2018-06-20 NOTE — ANESTHESIA PREPROCEDURE EVALUATION
Review of Systems/Medical History  Patient summary reviewed  Chart reviewed  No history of anesthetic complications     Cardiovascular  EKG reviewed, Exercise tolerance (METS): >4,  Hyperlipidemia, Hypertension , Past MI > 6 months, CAD , CAD status: 1VD, Cardiac stents > 1 year    Pulmonary  COPD mild- PRN medicaiton , Sleep apnea ,        GI/Hepatic    GERD well controlled,        Kidney disease CKD, Chronic kidney disease stage 3,        Endo/Other  Diabetes well controlled type 2 Insulin,      GYN       Hematology  Anemia ,  Lymphoma   Musculoskeletal  Negative musculoskeletal ROS        Neurology    Headaches,    Psychology   Negative psychology ROS              Physical Exam    Airway    Mallampati score: III  TM Distance: >3 FB  Neck ROM: full     Dental   upper dentures and lower dentures,     Cardiovascular  Rhythm: regular, Rate: normal,     Pulmonary  Pulmonary exam normal Breath sounds clear to auscultation,     Other Findings        Anesthesia Plan  ASA Score- 3     Anesthesia Type- IV sedation with anesthesia with ASA Monitors  Additional Monitors:   Airway Plan:         Plan Factors-    Induction-     Postoperative Plan-     Informed Consent- Anesthetic plan and risks discussed with patient  I personally reviewed this patient with the CRNA  Discussed and agreed on the Anesthesia Plan with the CRNA  Branden Jean Baptiste

## 2018-06-25 ENCOUNTER — APPOINTMENT (OUTPATIENT)
Dept: LAB | Facility: CLINIC | Age: 63
End: 2018-06-25
Payer: MEDICARE

## 2018-06-25 ENCOUNTER — HOSPITAL ENCOUNTER (OUTPATIENT)
Dept: INFUSION CENTER | Facility: CLINIC | Age: 63
Discharge: HOME | End: 2018-06-25
Payer: MEDICARE

## 2018-06-25 DIAGNOSIS — C91.10 CLL (CHRONIC LYMPHOCYTIC LEUKEMIA) (HCC): ICD-10-CM

## 2018-06-25 DIAGNOSIS — C91.10 CHRONIC LYMPHATIC LEUKEMIA (HCC): ICD-10-CM

## 2018-06-25 LAB
ALBUMIN SERPL BCP-MCNC: 3.6 G/DL (ref 3.5–5.7)
ALP SERPL-CCNC: 60 U/L (ref 46–116)
ALT SERPL W P-5'-P-CCNC: 24 U/L (ref 12–78)
ANION GAP SERPL CALCULATED.3IONS-SCNC: 6 MMOL/L (ref 4–13)
AST SERPL W P-5'-P-CCNC: 21 U/L (ref 5–45)
BASOPHILS # BLD AUTO: 0 THOUSAND/UL (ref 0–0.1)
BASOPHILS NFR MAR MANUAL: 0 % (ref 0–1)
BILIRUB SERPL-MCNC: 0.7 MG/DL (ref 0.2–1)
BUN SERPL-MCNC: 19 MG/DL (ref 5–25)
CALCIUM SERPL-MCNC: 8.9 MG/DL (ref 8.3–10.1)
CHLORIDE SERPL-SCNC: 107 MMOL/L (ref 98–108)
CO2 SERPL-SCNC: 29 MMOL/L (ref 21–32)
CREAT SERPL-MCNC: 0.9 MG/DL (ref 0.6–1.3)
EOSINOPHIL # BLD AUTO: 0 THOUSAND/UL (ref 0–0.61)
EOSINOPHIL NFR BLD MANUAL: 0 % (ref 0–6)
ERYTHROCYTE [DISTWIDTH] IN BLOOD BY AUTOMATED COUNT: 13.9 % (ref 11.6–15.1)
GFR SERPL CREATININE-BSD FRML MDRD: 91 ML/MIN/1.73SQ M
GLUCOSE SERPL-MCNC: 119 MG/DL (ref 65–140)
HCT VFR BLD AUTO: 42.6 % (ref 36.5–49.3)
HGB BLD-MCNC: 13.6 G/DL (ref 12–17)
HGB RETIC QN AUTO: 32.4 PG (ref 30–38.3)
IGG SERPL-MCNC: 66 MG/DL (ref 700–1600)
IMM RETICS NFR: 6.9 % (ref 0–14)
LDH SERPL-CCNC: 215 U/L (ref 81–234)
LYMPHOCYTES # BLD AUTO: 0.46 THOUSAND/UL (ref 0.6–4.47)
LYMPHOCYTES # BLD AUTO: 11 % (ref 14–44)
MCH RBC QN AUTO: 28.5 PG (ref 26.8–34.3)
MCHC RBC AUTO-ENTMCNC: 32.1 G/DL (ref 31.4–37.4)
MCV RBC AUTO: 89 FL (ref 82–98)
MONOCYTES # BLD AUTO: 0.34 THOUSAND/UL (ref 0–1.22)
MONOCYTES NFR BLD AUTO: 8 % (ref 4–12)
NEUTS BAND NFR BLD MANUAL: 6 % (ref 0–8)
NEUTS SEG # BLD: 3.4 THOUSAND/UL (ref 1.81–6.82)
NEUTS SEG NFR BLD AUTO: 75 % (ref 43–75)
OVALOCYTES BLD QL SMEAR: PRESENT
PLATELET # BLD AUTO: 41 THOUSANDS/UL (ref 149–390)
PLATELET BLD QL SMEAR: ABNORMAL
PMV BLD AUTO: 8.8 FL (ref 8.9–12.7)
POTASSIUM SERPL-SCNC: 3.9 MMOL/L (ref 3.5–5.3)
PROT SERPL-MCNC: 5.6 G/DL (ref 6.4–8.2)
RBC # BLD AUTO: 4.79 MILLION/UL (ref 3.88–5.62)
RETICS # AUTO: NORMAL 10*3/UL (ref 14356–105094)
RETICS # CALC: 1.79 % (ref 0.37–1.87)
SODIUM SERPL-SCNC: 142 MMOL/L (ref 136–145)
TOTAL CELLS COUNTED SPEC: 100
URATE SERPL-MCNC: 5 MG/DL (ref 4.2–8)
WBC # BLD AUTO: 4.2 THOUSAND/UL (ref 4.31–10.16)
WBC NRBC COR # BLD: 4.2 THOUSAND/UL (ref 4.31–10.16)

## 2018-06-25 PROCEDURE — 84550 ASSAY OF BLOOD/URIC ACID: CPT

## 2018-06-25 PROCEDURE — 85007 BL SMEAR W/DIFF WBC COUNT: CPT

## 2018-06-25 PROCEDURE — 80053 COMPREHEN METABOLIC PANEL: CPT

## 2018-06-25 PROCEDURE — 83615 LACTATE (LD) (LDH) ENZYME: CPT

## 2018-06-25 PROCEDURE — 85046 RETICYTE/HGB CONCENTRATE: CPT

## 2018-06-25 PROCEDURE — 85027 COMPLETE CBC AUTOMATED: CPT

## 2018-06-25 PROCEDURE — 82784 ASSAY IGA/IGD/IGG/IGM EACH: CPT

## 2018-06-25 NOTE — PROGRESS NOTES
Pt arrived to unit without complaint  Central labs drawn  Pt tolerated well  Port flushed per protocol w/ NSS only  Pt declined AVS  Pt left unit without questions or concerns

## 2018-06-25 NOTE — PLAN OF CARE
Problem: Potential for Falls  Goal: Patient will remain free of falls  INTERVENTIONS:  - Assess patient frequently for physical needs  -  Identify cognitive and physical deficits and behaviors that affect risk of falls    -  [place] fall precautions as indicated by assessment   - Educate patient/family on patient safety including physical limitations  - Instruct patient to call for assistance with activity based on assessment  - Modify environment to reduce risk of injury  - Consider OT/PT consult to assist with strengthening/mobility   Outcome: Progressing

## 2018-07-02 ENCOUNTER — HOSPITAL ENCOUNTER (OUTPATIENT)
Dept: INFUSION CENTER | Facility: CLINIC | Age: 63
Discharge: HOME | End: 2018-07-02
Payer: MEDICARE

## 2018-07-02 DIAGNOSIS — C91.10 CLL (CHRONIC LYMPHOCYTIC LEUKEMIA) (HCC): ICD-10-CM

## 2018-07-02 LAB
ALBUMIN SERPL BCP-MCNC: 3.7 G/DL (ref 3.5–5.7)
ALP SERPL-CCNC: 50 U/L (ref 46–116)
ALT SERPL W P-5'-P-CCNC: 45 U/L (ref 12–78)
ANION GAP SERPL CALCULATED.3IONS-SCNC: 6 MMOL/L (ref 4–13)
AST SERPL W P-5'-P-CCNC: 30 U/L (ref 5–45)
BASOPHILS # BLD AUTO: 0 THOUSAND/UL (ref 0–0.1)
BASOPHILS NFR MAR MANUAL: 0 % (ref 0–1)
BILIRUB SERPL-MCNC: 0.7 MG/DL (ref 0.2–1)
BUN SERPL-MCNC: 19 MG/DL (ref 5–25)
CALCIUM SERPL-MCNC: 9.2 MG/DL (ref 8.3–10.1)
CHLORIDE SERPL-SCNC: 105 MMOL/L (ref 98–108)
CO2 SERPL-SCNC: 29 MMOL/L (ref 21–32)
CREAT SERPL-MCNC: 1.3 MG/DL (ref 0.6–1.3)
EOSINOPHIL # BLD AUTO: 0.09 THOUSAND/UL (ref 0–0.61)
EOSINOPHIL NFR BLD MANUAL: 2 % (ref 0–6)
ERYTHROCYTE [DISTWIDTH] IN BLOOD BY AUTOMATED COUNT: 14.4 % (ref 11.6–15.1)
GFR SERPL CREATININE-BSD FRML MDRD: 58 ML/MIN/1.73SQ M
GLUCOSE SERPL-MCNC: 119 MG/DL (ref 65–140)
HCT VFR BLD AUTO: 44.6 % (ref 36.5–49.3)
HGB BLD-MCNC: 14.4 G/DL (ref 12–17)
LYMPHOCYTES # BLD AUTO: 0.62 THOUSAND/UL (ref 0.6–4.47)
LYMPHOCYTES # BLD AUTO: 14 % (ref 14–44)
MCH RBC QN AUTO: 28.8 PG (ref 26.8–34.3)
MCHC RBC AUTO-ENTMCNC: 32.3 G/DL (ref 31.4–37.4)
MCV RBC AUTO: 89 FL (ref 82–98)
MONOCYTES # BLD AUTO: 0.26 THOUSAND/UL (ref 0–1.22)
MONOCYTES NFR BLD AUTO: 6 % (ref 4–12)
NEUTS BAND NFR BLD MANUAL: 0 % (ref 0–8)
NEUTS SEG # BLD: 3.43 THOUSAND/UL (ref 1.81–6.82)
NEUTS SEG NFR BLD AUTO: 78 % (ref 43–75)
OVALOCYTES BLD QL SMEAR: PRESENT
PLATELET # BLD AUTO: 67 THOUSANDS/UL (ref 149–390)
PLATELET BLD QL SMEAR: ABNORMAL
PMV BLD AUTO: 8.9 FL (ref 8.9–12.7)
POTASSIUM SERPL-SCNC: 3.8 MMOL/L (ref 3.5–5.3)
PROT SERPL-MCNC: 6 G/DL (ref 6.4–8.2)
RBC # BLD AUTO: 5.01 MILLION/UL (ref 3.88–5.62)
SODIUM SERPL-SCNC: 140 MMOL/L (ref 136–145)
TOTAL CELLS COUNTED SPEC: 100
WBC # BLD AUTO: 4.4 THOUSAND/UL (ref 4.31–10.16)
WBC NRBC COR # BLD: 4.4 THOUSAND/UL (ref 4.31–10.16)

## 2018-07-02 PROCEDURE — 85027 COMPLETE CBC AUTOMATED: CPT

## 2018-07-02 PROCEDURE — 80053 COMPREHEN METABOLIC PANEL: CPT

## 2018-07-02 PROCEDURE — 85007 BL SMEAR W/DIFF WBC COUNT: CPT

## 2018-07-02 NOTE — PROGRESS NOTES
Labs obtained from port a cath  Port flushed per protocol  Pt has allergy to heparin, port flushed with saline only

## 2018-07-03 ENCOUNTER — TELEPHONE (OUTPATIENT)
Dept: HEMATOLOGY ONCOLOGY | Facility: CLINIC | Age: 63
End: 2018-07-03

## 2018-07-03 DIAGNOSIS — D59.9 ACQUIRED HEMOLYTIC ANEMIA (HCC): Primary | ICD-10-CM

## 2018-07-03 RX ORDER — PREDNISONE 10 MG/1
10 TABLET ORAL DAILY
Qty: 30 TABLET | Refills: 2 | Status: SHIPPED | OUTPATIENT
Start: 2018-07-03 | End: 2019-04-19 | Stop reason: DRUGHIGH

## 2018-07-09 ENCOUNTER — HOSPITAL ENCOUNTER (OUTPATIENT)
Dept: INFUSION CENTER | Facility: CLINIC | Age: 63
Discharge: HOME | End: 2018-07-09
Payer: MEDICARE

## 2018-07-09 DIAGNOSIS — C91.10 CLL (CHRONIC LYMPHOCYTIC LEUKEMIA) (HCC): ICD-10-CM

## 2018-07-09 LAB
ALBUMIN SERPL BCP-MCNC: 3.7 G/DL (ref 3.5–5.7)
ALP SERPL-CCNC: 55 U/L (ref 46–116)
ALT SERPL W P-5'-P-CCNC: 37 U/L (ref 12–78)
ANION GAP SERPL CALCULATED.3IONS-SCNC: 7 MMOL/L (ref 4–13)
ANISOCYTOSIS BLD QL SMEAR: PRESENT
AST SERPL W P-5'-P-CCNC: 26 U/L (ref 5–45)
BASOPHILS # BLD AUTO: 0 THOUSAND/UL (ref 0–0.1)
BASOPHILS NFR MAR MANUAL: 0 % (ref 0–1)
BILIRUB SERPL-MCNC: 0.5 MG/DL (ref 0.2–1)
BUN SERPL-MCNC: 15 MG/DL (ref 5–25)
CALCIUM SERPL-MCNC: 9.2 MG/DL (ref 8.3–10.1)
CHLORIDE SERPL-SCNC: 105 MMOL/L (ref 98–108)
CO2 SERPL-SCNC: 29 MMOL/L (ref 21–32)
CREAT SERPL-MCNC: 1.3 MG/DL (ref 0.6–1.3)
EOSINOPHIL # BLD AUTO: 0.08 THOUSAND/UL (ref 0–0.61)
EOSINOPHIL NFR BLD MANUAL: 2 % (ref 0–6)
ERYTHROCYTE [DISTWIDTH] IN BLOOD BY AUTOMATED COUNT: 15.2 % (ref 11.6–15.1)
GFR SERPL CREATININE-BSD FRML MDRD: 58 ML/MIN/1.73SQ M
GLUCOSE SERPL-MCNC: 117 MG/DL (ref 65–140)
HCT VFR BLD AUTO: 42.5 % (ref 36.5–49.3)
HGB BLD-MCNC: 13.5 G/DL (ref 12–17)
LG PLATELETS BLD QL SMEAR: PRESENT
LYMPHOCYTES # BLD AUTO: 0.57 THOUSAND/UL (ref 0.6–4.47)
LYMPHOCYTES # BLD AUTO: 15 % (ref 14–44)
MCH RBC QN AUTO: 28.5 PG (ref 26.8–34.3)
MCHC RBC AUTO-ENTMCNC: 31.9 G/DL (ref 31.4–37.4)
MCV RBC AUTO: 89 FL (ref 82–98)
METAMYELOCYTES NFR BLD MANUAL: 4 % (ref 0–1)
MONOCYTES # BLD AUTO: 0.11 THOUSAND/UL (ref 0–1.22)
MONOCYTES NFR BLD AUTO: 3 % (ref 4–12)
NEUTS BAND NFR BLD MANUAL: 2 % (ref 0–8)
NEUTS SEG # BLD: 2.89 THOUSAND/UL (ref 1.81–6.82)
NEUTS SEG NFR BLD AUTO: 74 % (ref 43–75)
OVALOCYTES BLD QL SMEAR: PRESENT
PLATELET # BLD AUTO: 67 THOUSANDS/UL (ref 149–390)
PLATELET BLD QL SMEAR: ABNORMAL
PMV BLD AUTO: 9.4 FL (ref 8.9–12.7)
POTASSIUM SERPL-SCNC: 3.7 MMOL/L (ref 3.5–5.3)
PROT SERPL-MCNC: 5.8 G/DL (ref 6.4–8.2)
RBC # BLD AUTO: 4.76 MILLION/UL (ref 3.88–5.62)
SODIUM SERPL-SCNC: 141 MMOL/L (ref 136–145)
TOTAL CELLS COUNTED SPEC: 100
WBC # BLD AUTO: 3.8 THOUSAND/UL (ref 4.31–10.16)
WBC NRBC COR # BLD: 3.8 THOUSAND/UL (ref 4.31–10.16)

## 2018-07-09 PROCEDURE — 85027 COMPLETE CBC AUTOMATED: CPT

## 2018-07-09 PROCEDURE — 80053 COMPREHEN METABOLIC PANEL: CPT

## 2018-07-09 PROCEDURE — 85007 BL SMEAR W/DIFF WBC COUNT: CPT

## 2018-07-09 NOTE — PLAN OF CARE
Problem: Potential for Falls  Goal: Patient will remain free of falls  INTERVENTIONS:  - Assess patient frequently for physical needs  -  Identify cognitive and physical deficits and behaviors that affect risk of falls    -  [place] fall precautions as indicated by assessment   - Educate patient/family on patient safety including physical limitations  - Instruct patient to call for assistance with activity based on assessment  - Modify environment to reduce risk of injury  - Consider OT/PT consult to assist with strengthening/mobility   Outcome: Progressing

## 2018-07-16 ENCOUNTER — HOSPITAL ENCOUNTER (OUTPATIENT)
Dept: INFUSION CENTER | Facility: CLINIC | Age: 63
Discharge: HOME | End: 2018-07-16
Payer: MEDICARE

## 2018-07-16 DIAGNOSIS — C91.10 CLL (CHRONIC LYMPHOCYTIC LEUKEMIA) (HCC): ICD-10-CM

## 2018-07-16 LAB
ALBUMIN SERPL BCP-MCNC: 3.6 G/DL (ref 3.5–5.7)
ALP SERPL-CCNC: 51 U/L (ref 46–116)
ALT SERPL W P-5'-P-CCNC: 28 U/L (ref 12–78)
ANION GAP SERPL CALCULATED.3IONS-SCNC: 6 MMOL/L (ref 4–13)
AST SERPL W P-5'-P-CCNC: 23 U/L (ref 5–45)
BASOPHILS # BLD AUTO: 0 THOUSAND/UL (ref 0–0.1)
BASOPHILS NFR MAR MANUAL: 0 % (ref 0–1)
BILIRUB SERPL-MCNC: 0.6 MG/DL (ref 0.2–1)
BUN SERPL-MCNC: 17 MG/DL (ref 5–25)
CALCIUM SERPL-MCNC: 9.1 MG/DL (ref 8.3–10.1)
CHLORIDE SERPL-SCNC: 105 MMOL/L (ref 98–108)
CO2 SERPL-SCNC: 29 MMOL/L (ref 21–32)
CREAT SERPL-MCNC: 1.3 MG/DL (ref 0.6–1.3)
EOSINOPHIL # BLD AUTO: 0.07 THOUSAND/UL (ref 0–0.61)
EOSINOPHIL NFR BLD MANUAL: 2 % (ref 0–6)
ERYTHROCYTE [DISTWIDTH] IN BLOOD BY AUTOMATED COUNT: 14.3 % (ref 11.6–15.1)
GFR SERPL CREATININE-BSD FRML MDRD: 58 ML/MIN/1.73SQ M
GLUCOSE SERPL-MCNC: 94 MG/DL (ref 65–140)
HCT VFR BLD AUTO: 41.9 % (ref 36.5–49.3)
HGB BLD-MCNC: 13.6 G/DL (ref 12–17)
LG PLATELETS BLD QL SMEAR: PRESENT
LYMPHOCYTES # BLD AUTO: 0.79 THOUSAND/UL (ref 0.6–4.47)
LYMPHOCYTES # BLD AUTO: 22 % (ref 14–44)
MCH RBC QN AUTO: 28.5 PG (ref 26.8–34.3)
MCHC RBC AUTO-ENTMCNC: 32.4 G/DL (ref 31.4–37.4)
MCV RBC AUTO: 88 FL (ref 82–98)
METAMYELOCYTES NFR BLD MANUAL: 1 % (ref 0–1)
MONOCYTES # BLD AUTO: 0.14 THOUSAND/UL (ref 0–1.22)
MONOCYTES NFR BLD AUTO: 4 % (ref 4–12)
NEUTS BAND NFR BLD MANUAL: 1 % (ref 0–8)
NEUTS SEG # BLD: 2.56 THOUSAND/UL (ref 1.81–6.82)
NEUTS SEG NFR BLD AUTO: 70 % (ref 43–75)
OVALOCYTES BLD QL SMEAR: PRESENT
PLATELET # BLD AUTO: 69 THOUSANDS/UL (ref 149–390)
PLATELET BLD QL SMEAR: ABNORMAL
PMV BLD AUTO: 8.2 FL (ref 8.9–12.7)
POTASSIUM SERPL-SCNC: 3.7 MMOL/L (ref 3.5–5.3)
PROT SERPL-MCNC: 5.7 G/DL (ref 6.4–8.2)
RBC # BLD AUTO: 4.77 MILLION/UL (ref 3.88–5.62)
SODIUM SERPL-SCNC: 140 MMOL/L (ref 136–145)
TOTAL CELLS COUNTED SPEC: 100
WBC # BLD AUTO: 3.6 THOUSAND/UL (ref 4.31–10.16)
WBC NRBC COR # BLD: 3.6 THOUSAND/UL (ref 4.31–10.16)

## 2018-07-16 PROCEDURE — 85007 BL SMEAR W/DIFF WBC COUNT: CPT

## 2018-07-16 PROCEDURE — 85027 COMPLETE CBC AUTOMATED: CPT

## 2018-07-16 PROCEDURE — 80053 COMPREHEN METABOLIC PANEL: CPT

## 2018-07-16 RX ORDER — ACETAMINOPHEN 325 MG/1
650 TABLET ORAL ONCE
Status: COMPLETED | OUTPATIENT
Start: 2018-07-17 | End: 2018-07-17

## 2018-07-16 RX ORDER — SODIUM CHLORIDE 9 MG/ML
20 INJECTION, SOLUTION INTRAVENOUS CONTINUOUS
Status: DISCONTINUED | OUTPATIENT
Start: 2018-07-17 | End: 2018-07-20 | Stop reason: HOSPADM

## 2018-07-16 NOTE — PLAN OF CARE
Problem: Potential for Falls  Goal: Patient will remain free of falls  INTERVENTIONS:  - Assess patient frequently for physical needs  -  Identify cognitive and physical deficits and behaviors that affect risk of falls    -  [place] fall precautions as indicated by assessment   - Educate patient/family on patient safety including physical limitations  - Instruct patient to call for assistance with activity based on assessment  - Modify environment to reduce risk of injury  - Consider OT/PT consult to assist with strengthening/mobility   Outcome: Progressing

## 2018-07-17 ENCOUNTER — HOSPITAL ENCOUNTER (OUTPATIENT)
Dept: INFUSION CENTER | Facility: CLINIC | Age: 63
Discharge: HOME | End: 2018-07-17
Payer: MEDICARE

## 2018-07-17 VITALS
BODY MASS INDEX: 29.44 KG/M2 | DIASTOLIC BLOOD PRESSURE: 84 MMHG | WEIGHT: 223.11 LBS | TEMPERATURE: 98.5 F | HEART RATE: 67 BPM | SYSTOLIC BLOOD PRESSURE: 142 MMHG | RESPIRATION RATE: 18 BRPM

## 2018-07-17 PROCEDURE — 96367 TX/PROPH/DG ADDL SEQ IV INF: CPT

## 2018-07-17 PROCEDURE — 96413 CHEMO IV INFUSION 1 HR: CPT

## 2018-07-17 PROCEDURE — 96415 CHEMO IV INFUSION ADDL HR: CPT

## 2018-07-17 RX ADMIN — ACETAMINOPHEN 650 MG: 325 TABLET, FILM COATED ORAL at 08:21

## 2018-07-17 RX ADMIN — SODIUM CHLORIDE 20 ML/HR: 0.9 INJECTION, SOLUTION INTRAVENOUS at 08:25

## 2018-07-17 RX ADMIN — DIPHENHYDRAMINE HYDROCHLORIDE 25 MG: 50 INJECTION, SOLUTION INTRAMUSCULAR; INTRAVENOUS at 08:45

## 2018-07-17 RX ADMIN — OBINUTUZUMAB 1000 MG: 1000 INJECTION, SOLUTION, CONCENTRATE INTRAVENOUS at 09:20

## 2018-07-17 RX ADMIN — DEXAMETHASONE SODIUM PHOSPHATE 20 MG: 10 INJECTION, SOLUTION INTRAMUSCULAR; INTRAVENOUS at 08:25

## 2018-07-17 NOTE — PROGRESS NOTES
Patient tolerated infusion well  Denies any discomfort  Pt is aware of all future appointments   Refused AVS

## 2018-07-17 NOTE — PLAN OF CARE
Problem: Potential for Falls  Goal: Patient will remain free of falls  INTERVENTIONS:  - Assess patient frequently for physical needs  -  Identify cognitive and physical deficits and behaviors that affect risk of falls    -  [place] fall precautions as indicated by assessment   - Educate patient/family on patient safety including physical limitations  - Instruct patient to call for assistance with activity based on assessment  - Modify environment to reduce risk of injury  - Consider OT/PT consult to assist with strengthening/mobility   Outcome: Progressing

## 2018-07-18 ENCOUNTER — OFFICE VISIT (OUTPATIENT)
Dept: HEMATOLOGY ONCOLOGY | Facility: CLINIC | Age: 63
End: 2018-07-18
Payer: MEDICARE

## 2018-07-18 VITALS
BODY MASS INDEX: 30.03 KG/M2 | RESPIRATION RATE: 17 BRPM | HEIGHT: 73 IN | HEART RATE: 78 BPM | OXYGEN SATURATION: 96 % | SYSTOLIC BLOOD PRESSURE: 138 MMHG | DIASTOLIC BLOOD PRESSURE: 64 MMHG | WEIGHT: 226.6 LBS | TEMPERATURE: 98.5 F

## 2018-07-18 DIAGNOSIS — D59.19 OTHER AUTOIMMUNE HEMOLYTIC ANEMIAS: ICD-10-CM

## 2018-07-18 DIAGNOSIS — C91.10 CLL (CHRONIC LYMPHOCYTIC LEUKEMIA) (HCC): Primary | ICD-10-CM

## 2018-07-18 PROCEDURE — 99214 OFFICE O/P EST MOD 30 MIN: CPT | Performed by: INTERNAL MEDICINE

## 2018-07-18 NOTE — PROGRESS NOTES
HPI:   Follow-up visit for CLL and autoimmune hemolytic anemia that was very profound at 1 time  Patient responded well to [facility] and low-dose ibrutinib, 140 mg daily  He is on prednisone 10 mg daily and would prefer to have dose reduced  Also he takes folic acid 1 mg daily  He has less tiredness  Less cough and shortness of breath     He has vascular purpura on his forearms as before  He is taking vitamin-D and calcium and is trying to stay active to prevent/ delay osteopenia /osteoporosis because he is on prednisone  Patient was in the hospital recently with the listeria and had intravenous antibiotics      On 3/20/17 patient found to have hemoglobin of 6 2, ,000  He was admitted to [facility] for blood transfusion and plan to transfer to [address] [place] [address]  On 3/20/17 WBC count 195,000, hemoglobin 4 9, platelet count 65  Direct coomb's was positive with undetectable haptoglobin  He was given 2 units of PRBCs, Solu-Medrol 1 g ×3 days and IVIG 1 g/kg daily ×3 days  His hemoglobin continued to drop  Bone marrow biopsy on 3/21 showed marrow cellularity of greater than 95% almost replaced by small lymphocytes, lambda light chain restricted, CD20 positive, CD19 positive, CD23 positive partially expressing CD11c and CD25 and CD5 positive and negative for CD 79 and CD38  He was treated with single dose of chlorambucil to control his CLL   3/22 second dose of chlorambucil, also Rituxan 375 mg/M ² autoimmune hemolytic anemia  WBC continued to rise, 266,000  Patient initiated with Venetoclax 20 mg daily  Tumor lysis monitored every 8 hours; given aggressive hydration and Lasix and remained euvolemic  3/24-WBC dropped to 112,000  3/25- WBC 63,000  3/26-WBC 15,000, , platelet count 64,216  Patient became febrile, 101 3  The cefepime initiated Venetoclax held  3/28-patient fell from bed, CT head negative   ANC 1200, cefepime discontinued and Levaquin 75 mg daily started sliding 3/29 given second dose of rituximab 375 mg/m ²   3/30-WBC meg to 14 5 thousand, platelets 70,879, Venetoclax restarted 10 mg every other day  3/31 WBC 4 4, , platelet count 70,351  4/1-4/4: WBC maintained less than 10,000, ANC and platelet count meg  He was discharged on Venetoclax 10 mg every other day  He was instructed to discontinue simvastatin, Ranexa, aspirin, metoprolol 50 mg q  day, losartan 50 mg q  day, Plavix 75 mg q  day, folic acid 437 µg b i d , prednisone 60 mg, allopurinol  He was advised to continue Levaquin 750 mg daily for 10 days, Lexapro 10 mg daily, fenofibrate 50 mg daily, gabapentin 100 mg twice daily, Protonix 40 mg daily     Imaging studies performed at [facility]:  Patient had echocardiogram while inpatient at [facility] on 3/21  This study was unremarkable  CT chest abdomen pelvis without contrast on 3/24 showed stable pulmonary nodules, patchy groundglass densities of lower lobes previously identified and which may represent volume loss or early infiltrate  Persistent diffuse bronchial wall thickening suggesting acute/chronic bronchitis changes  No bronchiectasis  No masses  Stable lymphadenopathy of mediastinum  Stable axillary lymphadenopathy  Splenomegaly 23 9 cm  Extensive lymphadenopathy throughout the entire retroperitoneal, small bowel mesentery, and pelvis  Largest lymph node in right lower quadrant measured 4 6 x 4 8  Stable enlarged left para-aortic lymph node measuring 6 2 x 6 8   4/12/17: Patient received one dose of Rituxan on 4/7/17  Venetoclax 10 mg every other day 4/5/17 - 4/9/17  Venetoclax 10 mg every day beginning 4/10/17  Monitoring CBC 3 times per week  4/28/17: patient had follow-up appointment at [facility] with Dr Praveena Campo  She recommended to slowly increase dose of veneteclax   Also, she recommended as he has had numerous treatments with Rituxan, if antibody directed therapy becomes indicated in the future recommendation was with Nelly Swan  She will be seeing her on a p r n  Basis      July 2017- Hemolysis  Disease not under control with veneteclax  Started prednisone 60 mg daily, folic acid, IBRUTINIB 780 mg daily and Gazyva       Sept 2017- 9/18-9/20 patient was admitted due to uncontrolled hyperglycemia with new onset DM type II due to chronic prednisone use for CLL/hemolytic anemia; also found to have profound neutropenia  Ibrutinib dose was reduced to 280 mg daily      October 2017- 10/7/17 - 10/14/17 patient was admitted due to cellulitis on his scalp     Dec 2017- Jaymie Pates decreased to 140 mg PO daily due to thrombocytopenia      4/23 - 4/27 patient was admitted due to listeria bacteremia  He was discharged on IV ampicillin  Echo negative for vegetations  Imbruvica and Nelly Swan was on hold at that time  Repeat CBC on 5/14/18 showed normal ANC, and hemoglobin   Platelets adequate at 83K       Current Outpatient Prescriptions:     acyclovir (ZOVIRAX) 400 MG tablet, Take 400 mg by mouth 2 (two) times a day, Disp: , Rfl:     aspirin 81 MG tablet, Take 81 mg by mouth every other day Every other day , Disp: , Rfl:     citalopram (CeleXA) 40 mg tablet, Take 40 mg by mouth daily, Disp: , Rfl:     fenofibrate (TRIGLIDE) 50 MG tablet, Take 134 mg by mouth daily  , Disp: , Rfl:     folic acid (FOLVITE) 1 mg tablet, Take 1 mg by mouth daily, Disp: , Rfl:     furosemide (LASIX) 40 mg tablet, furosemide 40 mg tablet, Disp: , Rfl:     gabapentin (NEURONTIN) 600 MG tablet, Take 600 mg by mouth 2 (two) times a day  , Disp: , Rfl:     glucose monitoring kit (FREESTYLE) monitoring kit, 1 each by Does not apply route as needed ( ) E 11 9, Disp: 1 each, Rfl: 0    Ibrutinib 140 MG TABS, Take 140 mg by mouth daily for 28 days, Disp: 30 tablet, Rfl: 0    insulin lispro (HumaLOG) 100 units/mL injection, Inject 10 Units under the skin 3 (three) times a day with meals, Disp: , Rfl: 0    Lancets (FREESTYLE) lancets, Use as instructed--test 2 times a day  E 11 9, Disp: 100 each, Rfl: 0    multivitamin (THERAGRAN) TABS, Take 1 tablet by mouth daily, Disp: , Rfl:     obinutuzumab (GAZYVA) 1000 mg/40 mL SOLN, Infuse into a venous catheter, Disp: , Rfl:     pantoprazole (PROTONIX) 40 mg tablet, Take 1 tablet (40 mg total) by mouth daily, Disp: 30 tablet, Rfl: 0    predniSONE 10 mg tablet, Take 1 tablet (10 mg total) by mouth daily, Disp: 30 tablet, Rfl: 2    sodium chloride, PF, 0 9 %, 10 mL by Intracatheter route see administration instructions 10mL into port before and after ampicillin doses, Disp: , Rfl: 0    VENTOLIN  (90 Base) MCG/ACT inhaler, INHALE 2 PUFFS 4 TIMES A DAY AS NEEDED, Disp: , Rfl: 3  No current facility-administered medications for this visit  Facility-Administered Medications Ordered in Other Visits:     sodium chloride 0 9 % infusion, 20 mL/hr, Intravenous, Continuous, Isac Hardy MD, Stopped at 07/17/18 1236    Allergies   Allergen Reactions    Heparin Other (See Comments)     Other reaction(s): Blood Disorders  clot    Macrolides And Ketolides Other (See Comments)     Interacts with other meds he is taking        No history exists  ROS:  07/18/18 Reviewed 13 systems:  Presently no headaches, seizures, dizziness, diplopia, dysphagia, hoarseness, chest pain, palpitations,  hemoptysis, abdominal pain, nausea, vomiting, change in bowel habits, melena, hematuria, fever, chills, bleeding, bone pains, skin rash, weight loss, arthritic symptoms,    numbness,  claudication and gait problem  Not unusually sensitive to heat or cold  No swelling of the ankles  No swollen glands  Patient is anxious   Other symptoms are in HPI        /64 (BP Location: Right arm, Cuff Size: Adult)   Pulse 78   Temp 98 5 °F (36 9 °C) (Tympanic)   Resp 17   Ht 6' 1" (1 854 m)   Wt 103 kg (226 lb 9 6 oz)   SpO2 96%   BMI 29 90 kg/m²     Physical Exam:  Alert, oriented, not in distress, no icterus, no oral thrush, no palpable neck mass, clear lung fields, regular heart rate, abdomen  soft and non tender, no palpable abdominal mass, no ascites, no edema of ankles, no calf tenderness, no focal neurological deficit, no skin rash , has vascular purpura on forearms, no palpable lymphadenopathy, good arterial pulses, no clubbing  Patient is anxious  Performance status 1      IMAGING:  No orders to display       LABS:  Results for orders placed or performed during the hospital encounter of 07/16/18   CBC and differential   Result Value Ref Range    WBC 3 60 (L) 4 31 - 10 16 Thousand/uL    Adjusted WBC 3 60 (L) 4 31 - 10 16 Thousand/uL    RBC 4 77 3 88 - 5 62 Million/uL    Hemoglobin 13 6 12 0 - 17 0 g/dL    Hematocrit 41 9 36 5 - 49 3 %    MCV 88 82 - 98 fL    MCH 28 5 26 8 - 34 3 pg    MCHC 32 4 31 4 - 37 4 g/dL    RDW 14 3 11 6 - 15 1 %    MPV 8 2 (L) 8 9 - 12 7 fL    Platelets 69 (L) 665 - 390 Thousands/uL   Comprehensive metabolic panel   Result Value Ref Range    Sodium 140 136 - 145 mmol/L    Potassium 3 7 3 5 - 5 3 mmol/L    Chloride 105 98 - 108 mmol/L    CO2 29 21 - 32 mmol/L    Anion Gap 6 4 - 13 mmol/L    BUN 17 5 - 25 mg/dL    Creatinine 1 30 0 60 - 1 30 mg/dL    Glucose 94 65 - 140 mg/dL    Calcium 9 1 8 3 - 10 1 mg/dL    AST 23 5 - 45 U/L    ALT 28 12 - 78 U/L    Alkaline Phosphatase 51 46 - 116 U/L    Total Protein 5 7 (L) 6 4 - 8 2 g/dL    Albumin 3 6 3 5 - 5 7 g/dL    Total Bilirubin 0 60 0 20 - 1 00 mg/dL    eGFR 58 ml/min/1 73sq m   Manual Differential (Non Wam)   Result Value Ref Range    Segmented % 70 43 - 75 %    Bands % 1 0 - 8 %    Lymphocytes % 22 14 - 44 %    Monocytes % 4 4 - 12 %    Eosinophils % 2 0 - 6 %    Basophils % 0 0 - 1 %    Metamyelocytes% 1 0 - 1 %    Neutrophils Absolute 2 56 1 81 - 6 82 Thousand/uL    Lymphocytes Absolute 0 79 0 60 - 4 47 Thousand/uL    Monocytes Absolute 0 14 0 00 - 1 22 Thousand/uL    Eosinophils Absolute 0 07 0 00 - 0 61 Thousand/uL    Basophils Absolute 0 00 0 00 - 0 10 Thousand/uL Total Counted 100     RBC Morphology      Ovalocytes Present     Platelet Estimate Decreased (A) Adequate    Large Platelet Present      Labs, Imaging, & Other studies:   All pertinent labs and imaging studies were personally reviewed    Lab Results   Component Value Date     07/16/2018    K 3 7 07/16/2018     07/16/2018    CO2 29 07/16/2018    ANIONGAP 6 07/16/2018    BUN 17 07/16/2018    CREATININE 1 30 07/16/2018    GLUCOSE 94 07/16/2018    GLUF 118 (H) 04/24/2018    CALCIUM 9 1 07/16/2018    AST 23 07/16/2018    ALT 28 07/16/2018    ALKPHOS 51 07/16/2018    PROT 5 7 (L) 07/16/2018    BILITOT 0 60 07/16/2018    EGFR 58 07/16/2018     Lab Results   Component Value Date    WBC 3 60 (L) 07/16/2018    HGB 13 6 07/16/2018    HCT 41 9 07/16/2018    MCV 88 07/16/2018    PLT 69 (L) 07/16/2018       Reviewed and discussed with patient  Assessment and plan: Follow-up visit for CLL and autoimmune hemolytic anemia that was very profound at 1 time  Patient responded well to [facility] and low-dose ibrutinib, 140 mg daily  He is on prednisone 10 mg daily and would prefer to have dose reduced  Also he takes folic acid 1 mg daily  He has less tiredness  Less cough and shortness of breath     He has vascular purpura on his forearms as before  He is taking vitamin-D and calcium and is trying to stay active to prevent/ delay osteopenia /osteoporosis because he is on prednisone  Georgette Ormond Physical examination and test results are as recorded and discussed  Patient is being continued on present doses of   Ibrutinib and gazyva  Prednisone dose is being decreased from 10 mg a day to 10 mg alternating with 5 mg daily  Patient's condition, counts and chemistry are being monitored closely  Condition discussed and explained  Questions answered  1  CLL (chronic lymphocytic leukemia) ([facility] )      2  Other autoimmune hemolytic anemias ([facility] )            Patient voiced understanding and agreement in the discussion  Counseling / Coordination of Care   Greater than 50% of total time was spent with the patient and / or family counseling and / or coordination of care

## 2018-07-18 NOTE — LETTER
July 18, 2018     Corie Mon MD  [id number]    Patient: Donato Singh   YOB: 1955   Date of Visit: 7/18/2018       Dear Dr Gerhardt Dade: Thank you for referring Ricardo Castle to me for evaluation  Below are my notes for this consultation  If you have questions, please do not hesitate to call me  I look forward to following your patient along with you  Sincerely,        Isac Hardy MD        CC: No Recipients  Isac Hardy MD  7/18/2018  7:29 PM  Sign at close encounter    HPI:   Follow-up visit for CLL and autoimmune hemolytic anemia that was very profound at 1 time  Patient responded well to [facility] and low-dose ibrutinib, 140 mg daily  He is on prednisone 10 mg daily and would prefer to have dose reduced  Also he takes folic acid 1 mg daily  He has less tiredness  Less cough and shortness of breath     He has vascular purpura on his forearms as before  He is taking vitamin-D and calcium and is trying to stay active to prevent/ delay osteopenia /osteoporosis because he is on prednisone  Patient was in the hospital recently with the listeria and had intravenous antibiotics      On 3/20/17 patient found to have hemoglobin of 6 2, ,000  He was admitted to [facility] for blood transfusion and plan to transfer to [address] [place] [address]  On 3/20/17 WBC count 195,000, hemoglobin 4 9, platelet count 65  Direct coomb's was positive with undetectable haptoglobin  He was given 2 units of PRBCs, Solu-Medrol 1 g ×3 days and IVIG 1 g/kg daily ×3 days  His hemoglobin continued to drop  Bone marrow biopsy on 3/21 showed marrow cellularity of greater than 95% almost replaced by small lymphocytes, lambda light chain restricted, CD20 positive, CD19 positive, CD23 positive partially expressing CD11c and CD25 and CD5 positive and negative for CD 79 and CD38    He was treated with single dose of chlorambucil to control his CLL   3/22 second dose of chlorambucil, also Rituxan 375 mg/M ² autoimmune hemolytic anemia  WBC continued to rise, 266,000  Patient initiated with Venetoclax 20 mg daily  Tumor lysis monitored every 8 hours; given aggressive hydration and Lasix and remained euvolemic  3/24WBC dropped to 112,000  3/25- WBC 63,000  3/26WBC 15,000, , platelet count 31,024  Patient became febrile, 101 3  The cefepime initiated Venetoclax held  3/28patient fell from bed, CT head negative  ANC 1200, cefepime discontinued and Levaquin 75 mg daily started sliding 3/29 given second dose of rituximab 375 mg/m ²   3/30WBC meg to 14 5 thousand, platelets 93,607, Venetoclax restarted 10 mg every other day  3/31 WBC 4 4, , platelet count 00,028  4/14/4: WBC maintained less than 10,000, ANC and platelet count meg  He was discharged on Venetoclax 10 mg every other day  He was instructed to discontinue simvastatin, Ranexa, aspirin, metoprolol 50 mg q  day, losartan 50 mg q  day, Plavix 75 mg q  day, folic acid 290 µg b i d , prednisone 60 mg, allopurinol  He was advised to continue Levaquin 750 mg daily for 10 days, Lexapro 10 mg daily, fenofibrate 50 mg daily, gabapentin 100 mg twice daily, Protonix 40 mg daily     Imaging studies performed at [facility]:  Patient had echocardiogram while inpatient at [facility] on 3/21  This study was unremarkable  CT chest abdomen pelvis without contrast on 3/24 showed stable pulmonary nodules, patchy groundglass densities of lower lobes previously identified and which may represent volume loss or early infiltrate  Persistent diffuse bronchial wall thickening suggesting acute/chronic bronchitis changes  No bronchiectasis  No masses  Stable lymphadenopathy of mediastinum  Stable axillary lymphadenopathy  Splenomegaly 23 9 cm  Extensive lymphadenopathy throughout the entire retroperitoneal, small bowel mesentery, and pelvis   Largest lymph node in right lower quadrant measured 4 6 x 4 8  Stable enlarged left para-aortic lymph node measuring 6 2 x 6 8   4/12/17: Patient received one dose of Rituxan on 4/7/17  Venetoclax 10 mg every other day 4/5/17 - 4/9/17  Venetoclax 10 mg every day beginning 4/10/17  Monitoring CBC 3 times per week  4/28/17: patient had follow-up appointment at [facility] with Dr Tushar Barron  She recommended to slowly increase dose of veneteclax  Also, she recommended as he has had numerous treatments with Rituxan, if antibody directed therapy becomes indicated in the future recommendation was with [facility]  She will be seeing her on a p r n  Basis      July 2017- Hemolysis  Disease not under control with veneteclax  Started prednisone 60 mg daily, folic acid, IBRUTINIB 412 mg daily and Gazyva       Sept 2017- 9/18-9/20 patient was admitted due to uncontrolled hyperglycemia with new onset DM type II due to chronic prednisone use for CLL/hemolytic anemia; also found to have profound neutropenia  Ibrutinib dose was reduced to 280 mg daily      October 2017- 10/7/17 - 10/14/17 patient was admitted due to cellulitis on his scalp     Dec 2017- Asya Daniel decreased to 140 mg PO daily due to thrombocytopenia      4/23 - 4/27 patient was admitted due to listeria bacteremia  He was discharged on IV ampicillin  Echo negative for vegetations  [facility] was on hold at that time  Repeat CBC on 5/14/18 showed normal ANC, and hemoglobin   Platelets adequate at 83K       Current Outpatient Prescriptions:     acyclovir (ZOVIRAX) 400 MG tablet, Take 400 mg by mouth 2 (two) times a day, Disp: , Rfl:     aspirin 81 MG tablet, Take 81 mg by mouth every other day Every other day , Disp: , Rfl:     citalopram (CeleXA) 40 mg tablet, Take 40 mg by mouth daily, Disp: , Rfl:     fenofibrate (TRIGLIDE) 50 MG tablet, Take 134 mg by mouth daily  , Disp: , Rfl:     folic acid (FOLVITE) 1 mg tablet, Take 1 mg by mouth daily, Disp: , Rfl:     furosemide (LASIX) 40 mg tablet, furosemide 40 mg tablet, Disp: , Rfl:     gabapentin (NEURONTIN) 600 MG tablet, Take 600 mg by mouth 2 (two) times a day  , Disp: , Rfl:     glucose monitoring kit (FREESTYLE) monitoring kit, 1 each by Does not apply route as needed ( ) E 11 9, Disp: 1 each, Rfl: 0    Ibrutinib 140 MG TABS, Take 140 mg by mouth daily for 28 days, Disp: 30 tablet, Rfl: 0    insulin lispro (HumaLOG) 100 units/mL injection, Inject 10 Units under the skin 3 (three) times a day with meals, Disp: , Rfl: 0    Lancets (FREESTYLE) lancets, Use as instructed--test 2 times a day  E 11 9, Disp: 100 each, Rfl: 0    multivitamin (THERAGRAN) TABS, Take 1 tablet by mouth daily, Disp: , Rfl:     obinutuzumab (GAZYVA) 1000 mg/40 mL SOLN, Infuse into a venous catheter, Disp: , Rfl:     pantoprazole (PROTONIX) 40 mg tablet, Take 1 tablet (40 mg total) by mouth daily, Disp: 30 tablet, Rfl: 0    predniSONE 10 mg tablet, Take 1 tablet (10 mg total) by mouth daily, Disp: 30 tablet, Rfl: 2    sodium chloride, PF, 0 9 %, 10 mL by Intracatheter route see administration instructions 10mL into port before and after ampicillin doses, Disp: , Rfl: 0    VENTOLIN  (90 Base) MCG/ACT inhaler, INHALE 2 PUFFS 4 TIMES A DAY AS NEEDED, Disp: , Rfl: 3  No current facility-administered medications for this visit  Facility-Administered Medications Ordered in Other Visits:     sodium chloride 0 9 % infusion, 20 mL/hr, Intravenous, Continuous, Nasim Navas MD, Stopped at 07/17/18 1236    Allergies   Allergen Reactions    Heparin Other (See Comments)     Other reaction(s): Blood Disorders  clot    Macrolides And Ketolides Other (See Comments)     Interacts with other meds he is taking        No history exists         ROS:  07/18/18 Reviewed 13 systems:  Presently no headaches, seizures, dizziness, diplopia, dysphagia, hoarseness, chest pain, palpitations,  hemoptysis, abdominal pain, nausea, vomiting, change in bowel habits, melena, hematuria, fever, chills, bleeding, bone pains, skin rash, weight loss, arthritic symptoms,    numbness,  claudication and gait problem  Not unusually sensitive to heat or cold  No swelling of the ankles  No swollen glands  Patient is anxious  Other symptoms are in HPI        /64 (BP Location: Right arm, Cuff Size: Adult)   Pulse 78   Temp 98 5 °F (36 9 °C) (Tympanic)   Resp 17   Ht 6' 1" (1 854 m)   Wt 103 kg (226 lb 9 6 oz)   SpO2 96%   BMI 29 90 kg/m²      Physical Exam:  Alert, oriented, not in distress, no icterus, no oral thrush, no palpable neck mass, clear lung fields, regular heart rate, abdomen  soft and non tender, no palpable abdominal mass, no ascites, no edema of ankles, no calf tenderness, no focal neurological deficit, no skin rash , has vascular purpura on forearms, no palpable lymphadenopathy, good arterial pulses, no clubbing  Patient is anxious  Performance status 1      IMAGING:  No orders to display       LABS:  Results for orders placed or performed during the hospital encounter of 07/16/18   CBC and differential   Result Value Ref Range    WBC 3 60 (L) 4 31 - 10 16 Thousand/uL    Adjusted WBC 3 60 (L) 4 31 - 10 16 Thousand/uL    RBC 4 77 3 88 - 5 62 Million/uL    Hemoglobin 13 6 12 0 - 17 0 g/dL    Hematocrit 41 9 36 5 - 49 3 %    MCV 88 82 - 98 fL    MCH 28 5 26 8 - 34 3 pg    MCHC 32 4 31 4 - 37 4 g/dL    RDW 14 3 11 6 - 15 1 %    MPV 8 2 (L) 8 9 - 12 7 fL    Platelets 69 (L) 095 - 390 Thousands/uL   Comprehensive metabolic panel   Result Value Ref Range    Sodium 140 136 - 145 mmol/L    Potassium 3 7 3 5 - 5 3 mmol/L    Chloride 105 98 - 108 mmol/L    CO2 29 21 - 32 mmol/L    Anion Gap 6 4 - 13 mmol/L    BUN 17 5 - 25 mg/dL    Creatinine 1 30 0 60 - 1 30 mg/dL    Glucose 94 65 - 140 mg/dL    Calcium 9 1 8 3 - 10 1 mg/dL    AST 23 5 - 45 U/L    ALT 28 12 - 78 U/L    Alkaline Phosphatase 51 46 - 116 U/L    Total Protein 5 7 (L) 6 4 - 8 2 g/dL    Albumin 3 6 3 5 - 5 7 g/dL    Total Bilirubin 0 60 0 20 - 1 00 mg/dL    eGFR 58 ml/min/1 73sq m   Manual Differential (Non Wam)   Result Value Ref Range    Segmented % 70 43 - 75 %    Bands % 1 0 - 8 %    Lymphocytes % 22 14 - 44 %    Monocytes % 4 4 - 12 %    Eosinophils % 2 0 - 6 %    Basophils % 0 0 - 1 %    Metamyelocytes% 1 0 - 1 %    Neutrophils Absolute 2 56 1 81 - 6 82 Thousand/uL    Lymphocytes Absolute 0 79 0 60 - 4 47 Thousand/uL    Monocytes Absolute 0 14 0 00 - 1 22 Thousand/uL    Eosinophils Absolute 0 07 0 00 - 0 61 Thousand/uL    Basophils Absolute 0 00 0 00 - 0 10 Thousand/uL    Total Counted 100     RBC Morphology      Ovalocytes Present     Platelet Estimate Decreased (A) Adequate    Large Platelet Present      Labs, Imaging, & Other studies:   All pertinent labs and imaging studies were personally reviewed    Lab Results   Component Value Date     07/16/2018    K 3 7 07/16/2018     07/16/2018    CO2 29 07/16/2018    ANIONGAP 6 07/16/2018    BUN 17 07/16/2018    CREATININE 1 30 07/16/2018    GLUCOSE 94 07/16/2018    GLUF 118 (H) 04/24/2018    CALCIUM 9 1 07/16/2018    AST 23 07/16/2018    ALT 28 07/16/2018    ALKPHOS 51 07/16/2018    PROT 5 7 (L) 07/16/2018    BILITOT 0 60 07/16/2018    EGFR 58 07/16/2018     Lab Results   Component Value Date    WBC 3 60 (L) 07/16/2018    HGB 13 6 07/16/2018    HCT 41 9 07/16/2018    MCV 88 07/16/2018    PLT 69 (L) 07/16/2018       Reviewed and discussed with patient  Assessment and plan: Follow-up visit for CLL and autoimmune hemolytic anemia that was very profound at 1 time  Patient responded well to [facility] and low-dose ibrutinib, 140 mg daily  He is on prednisone 10 mg daily and would prefer to have dose reduced  Also he takes folic acid 1 mg daily  He has less tiredness  Less cough and shortness of breath     He has vascular purpura on his forearms as before    He is taking vitamin-D and calcium and is trying to stay active to prevent/ delay osteopenia /osteoporosis because he is on prednisone  Dennys Glass Physical examination and test results are as recorded and discussed  Patient is being continued on present doses of   Ibrutinib and gazyva  Prednisone dose is being decreased from 10 mg a day to 10 mg alternating with 5 mg daily  Patient's condition, counts and chemistry are being monitored closely  Condition discussed and explained  Questions answered  1  CLL (chronic lymphocytic leukemia) ([facility] )      2  Other autoimmune hemolytic anemias ([facility] )            Patient voiced understanding and agreement in the discussion  Counseling / Coordination of Care   Greater than 50% of total time was spent with the patient and / or family counseling and / or coordination of care

## 2018-07-23 ENCOUNTER — APPOINTMENT (OUTPATIENT)
Dept: LAB | Facility: CLINIC | Age: 63
End: 2018-07-23
Payer: MEDICARE

## 2018-07-23 ENCOUNTER — HOSPITAL ENCOUNTER (OUTPATIENT)
Dept: INFUSION CENTER | Facility: CLINIC | Age: 63
Discharge: HOME | End: 2018-07-23
Payer: MEDICARE

## 2018-07-23 ENCOUNTER — TRANSCRIBE ORDERS (OUTPATIENT)
Dept: LAB | Facility: CLINIC | Age: 63
End: 2018-07-23

## 2018-07-23 DIAGNOSIS — C91.10 CLL (CHRONIC LYMPHOCYTIC LEUKEMIA) (HCC): ICD-10-CM

## 2018-07-23 DIAGNOSIS — C91.10 CLL (CHRONIC LYMPHOCYTIC LEUKEMIA) (HCC): Primary | ICD-10-CM

## 2018-07-23 DIAGNOSIS — C91.10 CHRONIC LYMPHOCYTIC LEUKEMIA (HCC): ICD-10-CM

## 2018-07-23 LAB
ALBUMIN SERPL BCP-MCNC: 3.8 G/DL (ref 3.5–5.7)
ALP SERPL-CCNC: 53 U/L (ref 46–116)
ALT SERPL W P-5'-P-CCNC: 29 U/L (ref 12–78)
ANION GAP SERPL CALCULATED.3IONS-SCNC: 9 MMOL/L (ref 4–13)
AST SERPL W P-5'-P-CCNC: 22 U/L (ref 5–45)
BASOPHILS # BLD AUTO: 0.04 THOUSAND/UL (ref 0–0.1)
BASOPHILS NFR MAR MANUAL: 1 % (ref 0–1)
BILIRUB DIRECT SERPL-MCNC: 0.2 MG/DL (ref 0–0.2)
BILIRUB SERPL-MCNC: 0.8 MG/DL (ref 0.2–1)
BUN SERPL-MCNC: 21 MG/DL (ref 5–25)
CALCIUM SERPL-MCNC: 9.2 MG/DL (ref 8.3–10.1)
CHLORIDE SERPL-SCNC: 105 MMOL/L (ref 98–108)
CO2 SERPL-SCNC: 28 MMOL/L (ref 21–32)
CREAT SERPL-MCNC: 1.5 MG/DL (ref 0.6–1.3)
EOSINOPHIL # BLD AUTO: 0.08 THOUSAND/UL (ref 0–0.61)
EOSINOPHIL NFR BLD MANUAL: 2 % (ref 0–6)
ERYTHROCYTE [DISTWIDTH] IN BLOOD BY AUTOMATED COUNT: 14.4 % (ref 11.6–15.1)
GFR SERPL CREATININE-BSD FRML MDRD: 49 ML/MIN/1.73SQ M
GLUCOSE SERPL-MCNC: 100 MG/DL (ref 65–140)
HCT VFR BLD AUTO: 42.7 % (ref 36.5–49.3)
HGB BLD-MCNC: 13.9 G/DL (ref 12–17)
HGB RETIC QN AUTO: 33.5 PG (ref 30–38.3)
IGG SERPL-MCNC: 69 MG/DL (ref 700–1600)
IMM RETICS NFR: 8.8 % (ref 0–14)
LDH SERPL-CCNC: 199 U/L (ref 81–234)
LG PLATELETS BLD QL SMEAR: PRESENT
LYMPHOCYTES # BLD AUTO: 0.57 THOUSAND/UL (ref 0.6–4.47)
LYMPHOCYTES # BLD AUTO: 14 % (ref 14–44)
MCH RBC QN AUTO: 28.5 PG (ref 26.8–34.3)
MCHC RBC AUTO-ENTMCNC: 32.5 G/DL (ref 31.4–37.4)
MCV RBC AUTO: 88 FL (ref 82–98)
MONOCYTES # BLD AUTO: 0.21 THOUSAND/UL (ref 0–1.22)
MONOCYTES NFR BLD AUTO: 5 % (ref 4–12)
NEUTS BAND NFR BLD MANUAL: 7 % (ref 0–8)
NEUTS SEG # BLD: 3.2 THOUSAND/UL (ref 1.81–6.82)
NEUTS SEG NFR BLD AUTO: 71 % (ref 43–75)
OVALOCYTES BLD QL SMEAR: PRESENT
PLATELET # BLD AUTO: 48 THOUSANDS/UL (ref 149–390)
PLATELET BLD QL SMEAR: ABNORMAL
PMV BLD AUTO: 9.3 FL (ref 8.9–12.7)
POTASSIUM SERPL-SCNC: 3.8 MMOL/L (ref 3.5–5.3)
PROT SERPL-MCNC: 6 G/DL (ref 6.4–8.2)
RBC # BLD AUTO: 4.88 MILLION/UL (ref 3.88–5.62)
RETICS # AUTO: ABNORMAL 10*3/UL (ref 14356–105094)
RETICS # CALC: 2.3 % (ref 0.37–1.87)
SODIUM SERPL-SCNC: 142 MMOL/L (ref 136–145)
TOTAL CELLS COUNTED SPEC: 100
URATE SERPL-MCNC: 4.4 MG/DL (ref 4.2–8)
WBC # BLD AUTO: 4.1 THOUSAND/UL (ref 4.31–10.16)
WBC NRBC COR # BLD: 4.1 THOUSAND/UL (ref 4.31–10.16)

## 2018-07-23 PROCEDURE — 85007 BL SMEAR W/DIFF WBC COUNT: CPT

## 2018-07-23 PROCEDURE — 85046 RETICYTE/HGB CONCENTRATE: CPT

## 2018-07-23 PROCEDURE — 85027 COMPLETE CBC AUTOMATED: CPT

## 2018-07-23 PROCEDURE — 80053 COMPREHEN METABOLIC PANEL: CPT

## 2018-07-23 PROCEDURE — 82248 BILIRUBIN DIRECT: CPT

## 2018-07-23 PROCEDURE — 82784 ASSAY IGA/IGD/IGG/IGM EACH: CPT

## 2018-07-23 PROCEDURE — 83615 LACTATE (LD) (LDH) ENZYME: CPT

## 2018-07-23 PROCEDURE — 84550 ASSAY OF BLOOD/URIC ACID: CPT

## 2018-07-23 PROCEDURE — 36415 COLL VENOUS BLD VENIPUNCTURE: CPT

## 2018-07-23 NOTE — PLAN OF CARE
Problem: Potential for Falls  Goal: Patient will remain free of falls  INTERVENTIONS:  - Assess patient frequently for physical needs  -  Identify cognitive and physical deficits and behaviors that affect risk of falls    -  [place] fall precautions as indicated by assessment   - Educate patient/family on patient safety including physical limitations  - Instruct patient to call for assistance with activity based on assessment  - Modify environment to reduce risk of injury  - Consider OT/PT consult to assist with strengthening/mobility   Outcome: Progressing

## 2018-07-30 ENCOUNTER — HOSPITAL ENCOUNTER (OUTPATIENT)
Dept: INFUSION CENTER | Facility: CLINIC | Age: 63
Discharge: HOME | End: 2018-07-30
Payer: MEDICARE

## 2018-07-30 DIAGNOSIS — C91.10 CLL (CHRONIC LYMPHOCYTIC LEUKEMIA) (HCC): ICD-10-CM

## 2018-07-30 LAB
ALBUMIN SERPL BCP-MCNC: 3.7 G/DL (ref 3.5–5.7)
ALP SERPL-CCNC: 59 U/L (ref 46–116)
ALT SERPL W P-5'-P-CCNC: 33 U/L (ref 12–78)
ANION GAP SERPL CALCULATED.3IONS-SCNC: 3 MMOL/L (ref 4–13)
ANISOCYTOSIS BLD QL SMEAR: PRESENT
AST SERPL W P-5'-P-CCNC: 27 U/L (ref 5–45)
BASOPHILS # BLD AUTO: 0 THOUSAND/UL (ref 0–0.1)
BASOPHILS NFR MAR MANUAL: 0 % (ref 0–1)
BILIRUB SERPL-MCNC: 0.7 MG/DL (ref 0.2–1)
BUN SERPL-MCNC: 16 MG/DL (ref 5–25)
CALCIUM SERPL-MCNC: 8.7 MG/DL (ref 8.3–10.1)
CHLORIDE SERPL-SCNC: 109 MMOL/L (ref 98–108)
CO2 SERPL-SCNC: 28 MMOL/L (ref 21–32)
CREAT SERPL-MCNC: 0.9 MG/DL (ref 0.6–1.3)
EOSINOPHIL # BLD AUTO: 0 THOUSAND/UL (ref 0–0.61)
EOSINOPHIL NFR BLD MANUAL: 0 % (ref 0–6)
ERYTHROCYTE [DISTWIDTH] IN BLOOD BY AUTOMATED COUNT: 15.5 % (ref 11.6–15.1)
GFR SERPL CREATININE-BSD FRML MDRD: 91 ML/MIN/1.73SQ M
GLUCOSE SERPL-MCNC: 121 MG/DL (ref 65–140)
HCT VFR BLD AUTO: 42.6 % (ref 36.5–49.3)
HGB BLD-MCNC: 13.6 G/DL (ref 12–17)
LYMPHOCYTES # BLD AUTO: 0.66 THOUSAND/UL (ref 0.6–4.47)
LYMPHOCYTES # BLD AUTO: 14 % (ref 14–44)
MCH RBC QN AUTO: 28.3 PG (ref 26.8–34.3)
MCHC RBC AUTO-ENTMCNC: 31.8 G/DL (ref 31.4–37.4)
MCV RBC AUTO: 89 FL (ref 82–98)
MONOCYTES # BLD AUTO: 0.38 THOUSAND/UL (ref 0–1.22)
MONOCYTES NFR BLD AUTO: 8 % (ref 4–12)
NEUTS BAND NFR BLD MANUAL: 4 % (ref 0–8)
NEUTS SEG # BLD: 3.67 THOUSAND/UL (ref 1.81–6.82)
NEUTS SEG NFR BLD AUTO: 74 % (ref 43–75)
OVALOCYTES BLD QL SMEAR: PRESENT
PLATELET # BLD AUTO: 65 THOUSANDS/UL (ref 149–390)
PLATELET BLD QL SMEAR: ABNORMAL
PMV BLD AUTO: 9.2 FL (ref 8.9–12.7)
POTASSIUM SERPL-SCNC: 4.2 MMOL/L (ref 3.5–5.3)
PROT SERPL-MCNC: 5.8 G/DL (ref 6.4–8.2)
RBC # BLD AUTO: 4.79 MILLION/UL (ref 3.88–5.62)
SODIUM SERPL-SCNC: 140 MMOL/L (ref 136–145)
TOTAL CELLS COUNTED SPEC: 100
WBC # BLD AUTO: 4.7 THOUSAND/UL (ref 4.31–10.16)
WBC NRBC COR # BLD: 4.7 THOUSAND/UL (ref 4.31–10.16)

## 2018-07-30 PROCEDURE — 85007 BL SMEAR W/DIFF WBC COUNT: CPT

## 2018-07-30 PROCEDURE — 85027 COMPLETE CBC AUTOMATED: CPT

## 2018-07-30 PROCEDURE — 80053 COMPREHEN METABOLIC PANEL: CPT

## 2018-07-30 NOTE — PLAN OF CARE
Problem: Potential for Falls  Goal: Patient will remain free of falls  INTERVENTIONS:  - Assess patient frequently for physical needs  -  Identify cognitive and physical deficits and behaviors that affect risk of falls    -  [place] fall precautions as indicated by assessment   - Educate patient/family on patient safety including physical limitations  - Instruct patient to call for assistance with activity based on assessment  - Modify environment to reduce risk of injury  - Consider OT/PT consult to assist with strengthening/mobility   Outcome: Progressing

## 2018-08-06 ENCOUNTER — HOSPITAL ENCOUNTER (OUTPATIENT)
Dept: INFUSION CENTER | Facility: CLINIC | Age: 63
Discharge: HOME | End: 2018-08-06
Payer: MEDICARE

## 2018-08-06 DIAGNOSIS — C91.10 CLL (CHRONIC LYMPHOCYTIC LEUKEMIA) (HCC): ICD-10-CM

## 2018-08-06 LAB
ALBUMIN SERPL BCP-MCNC: 3.8 G/DL (ref 3.5–5.7)
ALP SERPL-CCNC: 49 U/L (ref 46–116)
ALT SERPL W P-5'-P-CCNC: 31 U/L (ref 12–78)
ANION GAP SERPL CALCULATED.3IONS-SCNC: 3 MMOL/L (ref 4–13)
AST SERPL W P-5'-P-CCNC: 28 U/L (ref 5–45)
BASOPHILS # BLD AUTO: 0 THOUSAND/UL (ref 0–0.1)
BASOPHILS NFR MAR MANUAL: 0 % (ref 0–1)
BILIRUB SERPL-MCNC: 0.8 MG/DL (ref 0.2–1)
BUN SERPL-MCNC: 17 MG/DL (ref 5–25)
CALCIUM SERPL-MCNC: 8.8 MG/DL (ref 8.3–10.1)
CHLORIDE SERPL-SCNC: 110 MMOL/L (ref 98–108)
CO2 SERPL-SCNC: 29 MMOL/L (ref 21–32)
CREAT SERPL-MCNC: 1.3 MG/DL (ref 0.6–1.3)
EOSINOPHIL # BLD AUTO: 0.04 THOUSAND/UL (ref 0–0.61)
EOSINOPHIL NFR BLD MANUAL: 1 % (ref 0–6)
ERYTHROCYTE [DISTWIDTH] IN BLOOD BY AUTOMATED COUNT: 14.3 % (ref 11.6–15.1)
GFR SERPL CREATININE-BSD FRML MDRD: 58 ML/MIN/1.73SQ M
GLUCOSE SERPL-MCNC: 109 MG/DL (ref 65–140)
HCT VFR BLD AUTO: 43 % (ref 36.5–49.3)
HGB BLD-MCNC: 14 G/DL (ref 12–17)
LYMPHOCYTES # BLD AUTO: 0.63 THOUSAND/UL (ref 0.6–4.47)
LYMPHOCYTES # BLD AUTO: 18 % (ref 14–44)
MCH RBC QN AUTO: 28.5 PG (ref 26.8–34.3)
MCHC RBC AUTO-ENTMCNC: 32.5 G/DL (ref 31.4–37.4)
MCV RBC AUTO: 88 FL (ref 82–98)
MONOCYTES # BLD AUTO: 0.18 THOUSAND/UL (ref 0–1.22)
MONOCYTES NFR BLD AUTO: 5 % (ref 4–12)
NEUTS BAND NFR BLD MANUAL: 1 % (ref 0–8)
NEUTS SEG # BLD: 2.66 THOUSAND/UL (ref 1.81–6.82)
NEUTS SEG NFR BLD AUTO: 75 % (ref 43–75)
OVALOCYTES BLD QL SMEAR: PRESENT
PLATELET # BLD AUTO: 61 THOUSANDS/UL (ref 149–390)
PLATELET BLD QL SMEAR: ABNORMAL
PMV BLD AUTO: 8.7 FL (ref 8.9–12.7)
POTASSIUM SERPL-SCNC: 3.9 MMOL/L (ref 3.5–5.3)
PROT SERPL-MCNC: 6 G/DL (ref 6.4–8.2)
RBC # BLD AUTO: 4.91 MILLION/UL (ref 3.88–5.62)
SODIUM SERPL-SCNC: 142 MMOL/L (ref 136–145)
TOTAL CELLS COUNTED SPEC: 100
WBC # BLD AUTO: 3.5 THOUSAND/UL (ref 4.31–10.16)
WBC NRBC COR # BLD: 3.5 THOUSAND/UL (ref 4.31–10.16)

## 2018-08-06 PROCEDURE — 85007 BL SMEAR W/DIFF WBC COUNT: CPT

## 2018-08-06 PROCEDURE — 85027 COMPLETE CBC AUTOMATED: CPT

## 2018-08-06 PROCEDURE — 80053 COMPREHEN METABOLIC PANEL: CPT

## 2018-08-06 NOTE — PLAN OF CARE
Problem: Potential for Falls  Goal: Patient will remain free of falls  INTERVENTIONS:  - Assess patient frequently for physical needs  -  Identify cognitive and physical deficits and behaviors that affect risk of falls    -  [place] fall precautions as indicated by assessment   - Educate patient/family on patient safety including physical limitations  - Instruct patient to call for assistance with activity based on assessment  - Modify environment to reduce risk of injury  - Consider OT/PT consult to assist with strengthening/mobility   Outcome: Progressing

## 2018-08-11 ENCOUNTER — OFFICE VISIT (OUTPATIENT)
Dept: URGENT CARE | Facility: CLINIC | Age: 63
End: 2018-08-11
Payer: MEDICARE

## 2018-08-11 VITALS
DIASTOLIC BLOOD PRESSURE: 82 MMHG | WEIGHT: 211 LBS | TEMPERATURE: 98.2 F | RESPIRATION RATE: 16 BRPM | HEART RATE: 86 BPM | SYSTOLIC BLOOD PRESSURE: 128 MMHG | HEIGHT: 73 IN | OXYGEN SATURATION: 97 % | BODY MASS INDEX: 27.96 KG/M2

## 2018-08-11 DIAGNOSIS — L08.9 LOCAL INFECTION OF THE SKIN AND SUBCUTANEOUS TISSUE, UNSPECIFIED: Primary | ICD-10-CM

## 2018-08-11 PROCEDURE — G0463 HOSPITAL OUTPT CLINIC VISIT: HCPCS | Performed by: EMERGENCY MEDICINE

## 2018-08-11 PROCEDURE — 99213 OFFICE O/P EST LOW 20 MIN: CPT | Performed by: EMERGENCY MEDICINE

## 2018-08-11 RX ORDER — CEPHALEXIN 500 MG/1
500 CAPSULE ORAL EVERY 6 HOURS SCHEDULED
Qty: 28 CAPSULE | Refills: 0 | Status: SHIPPED | OUTPATIENT
Start: 2018-08-11 | End: 2018-08-18

## 2018-08-11 NOTE — PROGRESS NOTES
Assessment/Plan:    No problem-specific Assessment & Plan notes found for this encounter  Diagnoses and all orders for this visit:    Local infection of the skin and subcutaneous tissue, unspecified  -     cephalexin (KEFLEX) 500 mg capsule; Take 1 capsule (500 mg total) by mouth every 6 (six) hours for 7 days          Subjective:      Patient ID: Estephanie Varela is a 60 y o  male  Pt on chemo for CLL, c/obug bite on L lower shin which has gotten infected; red and swollen for 4 cm  around central scabbed area  Has been applying Neosporin without success      Insect Bite   This is a new problem  The current episode started in the past 7 days  The problem occurs constantly  The problem has been gradually worsening  Nothing aggravates the symptoms  Treatments tried: topical antibiotics  The treatment provided no relief  The following portions of the patient's history were reviewed and updated as appropriate: current medications, past family history, past medical history, past social history, past surgical history and problem list     Review of Systems   Skin:        Raised red pustule on L lower ant shin   All other systems reviewed and are negative  Objective:      /82   Pulse 86   Temp 98 2 °F (36 8 °C)   Resp 16   Ht 6' 1" (1 854 m)   Wt 95 7 kg (211 lb)   SpO2 97%   BMI 27 84 kg/m²          Physical Exam   Constitutional: He is oriented to person, place, and time  He appears well-developed and well-nourished  HENT:   Nose: Nose normal    Eyes: Pupils are equal, round, and reactive to light  Neck: Normal range of motion  Cardiovascular: Normal rate  Pulmonary/Chest: Effort normal    Abdominal: Soft  Musculoskeletal:        Legs:  Neurological: He is alert and oriented to person, place, and time  Skin: Skin is warm and dry  Psychiatric: He has a normal mood and affect  His behavior is normal  Judgment and thought content normal    Nursing note and vitals reviewed

## 2018-08-13 ENCOUNTER — HOSPITAL ENCOUNTER (OUTPATIENT)
Dept: INFUSION CENTER | Facility: CLINIC | Age: 63
Discharge: HOME | End: 2018-08-13
Payer: MEDICARE

## 2018-08-13 DIAGNOSIS — C91.10 CLL (CHRONIC LYMPHOCYTIC LEUKEMIA) (HCC): ICD-10-CM

## 2018-08-13 LAB
ALBUMIN SERPL BCP-MCNC: 3.6 G/DL (ref 3.5–5.7)
ALP SERPL-CCNC: 50 U/L (ref 46–116)
ALT SERPL W P-5'-P-CCNC: 29 U/L (ref 12–78)
ANION GAP SERPL CALCULATED.3IONS-SCNC: 2 MMOL/L (ref 4–13)
ANISOCYTOSIS BLD QL SMEAR: PRESENT
AST SERPL W P-5'-P-CCNC: 24 U/L (ref 5–45)
BASOPHILS # BLD AUTO: 0.04 THOUSAND/UL (ref 0–0.1)
BASOPHILS NFR MAR MANUAL: 1 % (ref 0–1)
BILIRUB SERPL-MCNC: 0.7 MG/DL (ref 0.2–1)
BUN SERPL-MCNC: 19 MG/DL (ref 5–25)
CALCIUM SERPL-MCNC: 8.8 MG/DL (ref 8.3–10.1)
CHLORIDE SERPL-SCNC: 113 MMOL/L (ref 98–108)
CO2 SERPL-SCNC: 28 MMOL/L (ref 21–32)
CREAT SERPL-MCNC: 1 MG/DL (ref 0.6–1.3)
EOSINOPHIL # BLD AUTO: 0 THOUSAND/UL (ref 0–0.61)
EOSINOPHIL NFR BLD MANUAL: 0 % (ref 0–6)
ERYTHROCYTE [DISTWIDTH] IN BLOOD BY AUTOMATED COUNT: 15.1 % (ref 11.6–15.1)
GFR SERPL CREATININE-BSD FRML MDRD: 80 ML/MIN/1.73SQ M
GLUCOSE SERPL-MCNC: 95 MG/DL (ref 65–140)
HCT VFR BLD AUTO: 40.6 % (ref 36.5–49.3)
HGB BLD-MCNC: 13.1 G/DL (ref 12–17)
LYMPHOCYTES # BLD AUTO: 0.82 THOUSAND/UL (ref 0.6–4.47)
LYMPHOCYTES # BLD AUTO: 21 % (ref 14–44)
MCH RBC QN AUTO: 28.7 PG (ref 26.8–34.3)
MCHC RBC AUTO-ENTMCNC: 32.4 G/DL (ref 31.4–37.4)
MCV RBC AUTO: 89 FL (ref 82–98)
MONOCYTES # BLD AUTO: 0.12 THOUSAND/UL (ref 0–1.22)
MONOCYTES NFR BLD AUTO: 3 % (ref 4–12)
NEUTS BAND NFR BLD MANUAL: 2 % (ref 0–8)
NEUTS SEG # BLD: 2.93 THOUSAND/UL (ref 1.81–6.82)
NEUTS SEG NFR BLD AUTO: 73 % (ref 43–75)
PLATELET # BLD AUTO: 65 THOUSANDS/UL (ref 149–390)
PLATELET BLD QL SMEAR: ABNORMAL
PMV BLD AUTO: 9.8 FL (ref 8.9–12.7)
POTASSIUM SERPL-SCNC: 4.1 MMOL/L (ref 3.5–5.3)
PROT SERPL-MCNC: 5.7 G/DL (ref 6.4–8.2)
RBC # BLD AUTO: 4.58 MILLION/UL (ref 3.88–5.62)
SODIUM SERPL-SCNC: 143 MMOL/L (ref 136–145)
TOTAL CELLS COUNTED SPEC: 100
WBC # BLD AUTO: 3.9 THOUSAND/UL (ref 4.31–10.16)
WBC NRBC COR # BLD: 3.9 THOUSAND/UL (ref 4.31–10.16)

## 2018-08-13 PROCEDURE — 85027 COMPLETE CBC AUTOMATED: CPT

## 2018-08-13 PROCEDURE — 85007 BL SMEAR W/DIFF WBC COUNT: CPT

## 2018-08-13 PROCEDURE — 80053 COMPREHEN METABOLIC PANEL: CPT

## 2018-08-13 RX ORDER — SODIUM CHLORIDE 9 MG/ML
20 INJECTION, SOLUTION INTRAVENOUS CONTINUOUS
Status: DISCONTINUED | OUTPATIENT
Start: 2018-08-14 | End: 2018-08-17 | Stop reason: HOSPADM

## 2018-08-13 RX ORDER — ACETAMINOPHEN 325 MG/1
650 TABLET ORAL ONCE
Status: COMPLETED | OUTPATIENT
Start: 2018-08-14 | End: 2018-08-14

## 2018-08-13 NOTE — PLAN OF CARE
Problem: Potential for Falls  Goal: Patient will remain free of falls  INTERVENTIONS:  - Assess patient frequently for physical needs  -  Identify cognitive and physical deficits and behaviors that affect risk of falls    -  [place] fall precautions as indicated by assessment   - Educate patient/family on patient safety including physical limitations  - Instruct patient to call for assistance with activity based on assessment  - Modify environment to reduce risk of injury  - Consider OT/PT consult to assist with strengthening/mobility   Outcome: Progressing

## 2018-08-14 ENCOUNTER — HOSPITAL ENCOUNTER (OUTPATIENT)
Dept: INFUSION CENTER | Facility: CLINIC | Age: 63
Discharge: HOME | End: 2018-08-14
Payer: MEDICARE

## 2018-08-14 VITALS
SYSTOLIC BLOOD PRESSURE: 154 MMHG | HEART RATE: 66 BPM | TEMPERATURE: 97.7 F | RESPIRATION RATE: 16 BRPM | DIASTOLIC BLOOD PRESSURE: 89 MMHG

## 2018-08-14 PROCEDURE — 96413 CHEMO IV INFUSION 1 HR: CPT

## 2018-08-14 PROCEDURE — 96367 TX/PROPH/DG ADDL SEQ IV INF: CPT

## 2018-08-14 PROCEDURE — 96415 CHEMO IV INFUSION ADDL HR: CPT

## 2018-08-14 RX ADMIN — DEXAMETHASONE SODIUM PHOSPHATE 20 MG: 10 INJECTION, SOLUTION INTRAMUSCULAR; INTRAVENOUS at 09:02

## 2018-08-14 RX ADMIN — ACETAMINOPHEN 650 MG: 325 TABLET, FILM COATED ORAL at 08:36

## 2018-08-14 RX ADMIN — SODIUM CHLORIDE 20 ML/HR: 0.9 INJECTION, SOLUTION INTRAVENOUS at 08:36

## 2018-08-14 RX ADMIN — DIPHENHYDRAMINE HYDROCHLORIDE 25 MG: 50 INJECTION, SOLUTION INTRAMUSCULAR; INTRAVENOUS at 08:36

## 2018-08-14 RX ADMIN — OBINUTUZUMAB 1000 MG: 1000 INJECTION, SOLUTION, CONCENTRATE INTRAVENOUS at 09:56

## 2018-08-14 NOTE — PROGRESS NOTES
Pt here for chemo, labs from 8/13/18 platelet count 65, [facility] verbal ok to [facility], will send written order

## 2018-08-14 NOTE — PLAN OF CARE
Problem: Potential for Falls  Goal: Patient will remain free of falls  INTERVENTIONS:  - Assess patient frequently for physical needs  -  Identify cognitive and physical deficits and behaviors that affect risk of falls    -  [place] fall precautions as indicated by assessment   - Educate patient/family on patient safety including physical limitations  - Instruct patient to call for assistance with activity based on assessment  - Modify environment to reduce risk of injury  - Consider OT/PT consult to assist with strengthening/mobility   Outcome: Progressing

## 2018-08-14 NOTE — PROGRESS NOTES
Pt tolerated treatment today without incident    Pt declined AVS but is aware of future appointments

## 2018-08-15 ENCOUNTER — OFFICE VISIT (OUTPATIENT)
Dept: HEMATOLOGY ONCOLOGY | Facility: CLINIC | Age: 63
End: 2018-08-15
Payer: MEDICARE

## 2018-08-15 VITALS
DIASTOLIC BLOOD PRESSURE: 80 MMHG | TEMPERATURE: 98.5 F | WEIGHT: 225.6 LBS | HEART RATE: 66 BPM | BODY MASS INDEX: 29.9 KG/M2 | HEIGHT: 73 IN | RESPIRATION RATE: 17 BRPM | OXYGEN SATURATION: 99 % | SYSTOLIC BLOOD PRESSURE: 154 MMHG

## 2018-08-15 DIAGNOSIS — D59.19 OTHER AUTOIMMUNE HEMOLYTIC ANEMIAS: ICD-10-CM

## 2018-08-15 DIAGNOSIS — D75.82 HIT (HEPARIN-INDUCED THROMBOCYTOPENIA) (HCC): ICD-10-CM

## 2018-08-15 DIAGNOSIS — C91.10 CLL (CHRONIC LYMPHOCYTIC LEUKEMIA) (HCC): Primary | ICD-10-CM

## 2018-08-15 PROCEDURE — 99214 OFFICE O/P EST MOD 30 MIN: CPT | Performed by: PHYSICIAN ASSISTANT

## 2018-08-15 NOTE — PROGRESS NOTES
Hematology/Oncology Outpatient Follow-up  Deirdre Matson 60 y o  male 1955 [id number]    Date:  8/15/2018      Assessment and Plan:  1  CLL (chronic lymphocytic leukemia) ([facility] [address] )  Patient is on treatment with Ibrutinib 140 mg PO daily and Gazayva 1000 mg every 4 weeks  He continues to tolerate well  Hemoglobin remains stable  Platelets are also stable  He will have CBC every other week at this time       2  Other autoimmune hemolytic anemias (HCC)  Hemoglobin remains stable  He has been on prednisone 5 mg and 10 mg every other day for the past 1 month  Hemoglobin remains stable  If hemoglobin continues to be stable, will decrease to 5 mg PO daily next month  3  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)  Patient is aware of this  No heparin products  HPI:  On 3/20/17 patient found to have hemoglobin of 6 2, ,000  He was admitted to [place] [address] for blood transfusion and plan to transfer to [address] [place] [address]  On 3/20/17 WBC count 195,000, hemoglobin 4 9, platelet count 65  Direct coomb's was positive with undetectable haptoglobin  He was given 2 units of PRBCs, Solu-Medrol 1 g ×3 days and IVIG 1 g/kg daily ×3 days  His hemoglobin continued to drop  Bone marrow biopsy on 3/21 showed marrow cellularity of greater than 95% almost replaced by small lymphocytes, lambda light chain restricted, CD20 positive, CD19 positive, CD23 positive partially expressing CD11c and CD25 and CD5 positive and negative for CD 79 and CD38  He was treated with single dose of chlorambucil to control his CLL   3/22 second dose of chlorambucil, also Rituxan 375 mg/M ² autoimmune hemolytic anemia  WBC continued to rise, 266,000  Patient initiated with Venetoclax 20 mg daily  Tumor lysis monitored every 8 hours; given aggressive hydration and Lasix and remained euvolemic  3/24-WBC dropped to 112,000  3/25- WBC 63,000  3/26-WBC 15,000, , platelet count 88,686   Patient became febrile, 101 3  The cefepime initiated Venetoclax held  3/28-patient fell from bed, CT head negative  ANC 1200, cefepime discontinued and Levaquin 75 mg daily started sliding 3/29 given second dose of rituximab 375 mg/m ²   3/30-WBC meg to 14 5 thousand, platelets 35,137, Venetoclax restarted 10 mg every other day  3/31 WBC 4 4, , platelet count 74,926  4/1-4/4: WBC maintained less than 10,000, ANC and platelet count meg  He was discharged on Venetoclax 10 mg every other day  He was instructed to discontinue simvastatin, Ranexa, aspirin, metoprolol 50 mg q  day, losartan 50 mg q  day, Plavix 75 mg q  day, folic acid 177 µg b i d , prednisone 60 mg, allopurinol  He was advised to continue Levaquin 750 mg daily for 10 days, Lexapro 10 mg daily, fenofibrate 50 mg daily, gabapentin 100 mg twice daily, Protonix 40 mg daily     Imaging studies performed at [facility]:  Patient had echocardiogram while inpatient at [facility] on 3/21  This study was unremarkable  CT chest abdomen pelvis without contrast on 3/24 showed stable pulmonary nodules, patchy groundglass densities of lower lobes previously identified and which may represent volume loss or early infiltrate  Persistent diffuse bronchial wall thickening suggesting acute/chronic bronchitis changes  No bronchiectasis  No masses  Stable lymphadenopathy of mediastinum  Stable axillary lymphadenopathy  Splenomegaly 23 9 cm  Extensive lymphadenopathy throughout the entire retroperitoneal, small bowel mesentery, and pelvis  Largest lymph node in right lower quadrant measured 4 6 x 4 8  Stable enlarged left para-aortic lymph node measuring 6 2 x 6 8   4/12/17: Patient received one dose of Rituxan on 4/7/17  Venetoclax 10 mg every other day 4/5/17 - 4/9/17  Venetoclax 10 mg every day beginning 4/10/17  Monitoring CBC 3 times per week  4/28/17: patient had follow-up appointment at [facility] with Dr Janene Waddell   She recommended to slowly increase dose of veneteclax  Also, she recommended as he has had numerous treatments with Rituxan, if antibody directed therapy becomes indicated in the future recommendation was with [facility]  She will be seeing her on a p r n  Basis      July 2017- Hemolysis  Disease not under control with veneteclax  Started prednisone 60 mg daily, folic acid, IBRUTINIB 518 mg daily and Gazyva       Sept 2017- 9/18-9/20 patient was admitted due to uncontrolled hyperglycemia with new onset DM type II due to chronic prednisone use for CLL/hemolytic anemia; also found to have profound neutropenia  Ibrutinib dose was reduced to 280 mg daily      October 2017- 10/7/17 - 10/14/17 patient was admitted due to cellulitis on his scalp     Dec 2017- [employer] decreased to 140 mg PO daily due to thrombocytopenia      4/23 - 4/27 patient was admitted due to listeria bacteremia  He was discharged on IV ampicillin  Echo negative for vegetations  I[facility]ica and [facility] was on hold at that time  Repeat CBC on 5/14/18 showed normal ANC, and hemoglobin  Platelets adequate at 83K    Interval history: had a bug bite that was becoming more inflamed  Went to urgent care and placed on Keflex on 8/11/18  This is almost completed  ROS: Review of Systems   Constitutional: Negative for activity change, appetite change, chills, fatigue and fever  HENT: Negative for mouth sores and nosebleeds  Respiratory: Negative for cough and shortness of breath  Cardiovascular: Negative for chest pain, palpitations and leg swelling  Gastrointestinal: Negative for abdominal pain, blood in stool, constipation, diarrhea, nausea and vomiting  Genitourinary: Negative for dysuria  Musculoskeletal: Negative for arthralgias, joint swelling and myalgias  Skin: Negative  Neurological: Negative for dizziness, light-headedness, numbness and headaches  Hematological: Bruises/bleeds easily  Psychiatric/Behavioral: Negative          Past Medical History:   Diagnosis Date    CAD (coronary artery disease) 2004    Cellulitis of scalp     CKD (chronic kidney disease) stage 3, GFR 30-59 ml/min     CLL (chronic lymphocytic leukemia) (HCC)     [facility]     Diabetes mellitus ([facility] )     Dyslipidemia     H/O blood clots     Hemolytic anemia (HCC)     History of TIA (transient ischemic attack)     Hyperlipidemia     Hypertension     Multiple gastric ulcers     Myocardial infarction ([facility] )     acute    Neutropenia (HCC)     Recurrent sinusitis     Sleep apnea     Thrombocytopenia (HCC)     Vertigo        Past Surgical History:   Procedure Laterality Date    ARTHROSCOPIC REPAIR ACL      lt knee    CARDIAC SURGERY      cardiac cath with stent    FOOT SURGERY      HAND SURGERY      KNEE ARTHROSCOPY      OTHER SURGICAL HISTORY      cardiac cath lesion 1, 1st adjunct treat device: stent    PORTACATH PLACEMENT      [place] ESOPHAGOGASTRODUODENOSCOPY TRANSORAL DIAGNOSTIC N/A 10/5/2017    Procedure: ESOPHAGOGASTRODUODENOSCOPY (EGD) with bx;  Surgeon: Tejas Maria DO;  [facility]; Service: Gastroenterology    [place] ESOPHAGOGASTRODUODENOSCOPY TRANSORAL DIAGNOSTIC N/A 3/8/2018    Procedure: ESOPHAGOGASTRODUODENOSCOPY (EGD) with biopsy;  Surgeon: Tejas Maria DO;  [facility]; Service: Gastroenterology    [place] ESOPHAGOGASTRODUODENOSCOPY TRANSORAL DIAGNOSTIC N/A 6/20/2018    Procedure: ESOPHAGOGASTRODUODENOSCOPY (EGD) with Dilation;  Surgeon: Tejas Maria DO;  [facility];   Service: Gastroenterology    [place] ESOPHAGOSCOPY FLEXIBLE TRANSORAL WITH BIOPSY N/A 9/2/2016    Procedure: ESOPHAGOGASTRODUODENOSCOPY (EGD); ESOPHAGEAL DILATION; ESOPHAGEAL BIOPSY;  Surgeon: Dari Calvo MD;  [facility];  Service: Thoracic    TONSILLECTOMY      UPPER GASTROINTESTINAL ENDOSCOPY      x 6       Social History     Social History    Marital status: /Civil Union     Spouse name: N/A    Number of children: N/A    Years of education: N/A     Social History Main Topics    Smoking status: Former Smoker    Smokeless tobacco: Never Used      Comment: pt refuses to answer question; current every day smoker as per  Allscripts    Alcohol use No      Comment: social use as per Allscripts    Drug use: No    Sexual activity: Not on file     Other Topics Concern    Not on file     Social History Narrative    No narrative on file       Family History   Problem Relation Age of Onset    Leukemia Mother         chronic    Colon polyps Mother         sidmoid    No Known Problems Father        Allergies   Allergen Reactions    Heparin Other (See Comments)     Other reaction(s): Blood Disorders  clot    Macrolides And Ketolides Other (See Comments)     Interacts with other meds he is taking         Current Outpatient Prescriptions:     acyclovir (ZOVIRAX) 400 MG tablet, Take 400 mg by mouth 2 (two) times a day, Disp: , Rfl:     aspirin 81 MG tablet, Take 81 mg by mouth every other day Every other day , Disp: , Rfl:     cephalexin (KEFLEX) 500 mg capsule, Take 1 capsule (500 mg total) by mouth every 6 (six) hours for 7 days, Disp: 28 capsule, Rfl: 0    citalopram (CeleXA) 40 mg tablet, Take 40 mg by mouth daily, Disp: , Rfl:     fenofibrate (TRIGLIDE) 50 MG tablet, Take 134 mg by mouth daily  , Disp: , Rfl:     folic acid (FOLVITE) 1 mg tablet, Take 1 mg by mouth daily, Disp: , Rfl:     furosemide (LASIX) 40 mg tablet, furosemide 40 mg tablet, Disp: , Rfl:     gabapentin (NEURONTIN) 600 MG tablet, Take 600 mg by mouth 2 (two) times a day  , Disp: , Rfl:     glucose monitoring kit (FREESTYLE) monitoring kit, 1 each by Does not apply route as needed ( ) E 11 9, Disp: 1 each, Rfl: 0    insulin lispro (HumaLOG) 100 units/mL injection, Inject 10 Units under the skin 3 (three) times a day with meals, Disp: , Rfl: 0    Lancets (FREESTYLE) lancets, Use as instructed--test 2 times a day  E 11 9, Disp: 100 each, Rfl: 0   multivitamin (THERAGRAN) TABS, Take 1 tablet by mouth daily, Disp: , Rfl:     obinutuzumab (GAZYVA) 1000 mg/40 mL SOLN, Infuse into a venous catheter, Disp: , Rfl:     pantoprazole (PROTONIX) 40 mg tablet, Take 1 tablet (40 mg total) by mouth daily, Disp: 30 tablet, Rfl: 0    predniSONE 10 mg tablet, Take 1 tablet (10 mg total) by mouth daily, Disp: 30 tablet, Rfl: 2    sodium chloride, PF, 0 9 %, 10 mL by Intracatheter route see administration instructions 10mL into port before and after ampicillin doses, Disp: , Rfl: 0    VENTOLIN  (90 Base) MCG/ACT inhaler, INHALE 2 PUFFS 4 TIMES A DAY AS NEEDED, Disp: , Rfl: 3    Ibrutinib 140 MG TABS, Take 140 mg by mouth daily for 28 days, Disp: 30 tablet, Rfl: 0  No current facility-administered medications for this visit  Facility-Administered Medications Ordered in Other Visits:     sodium chloride 0 9 % infusion, 20 mL/hr, Intravenous, Continuous, Gaby Mercado MD, Stopped at 08/14/18 1325      Physical Exam:  /80 (BP Location: Right arm, Patient Position: Sitting, Cuff Size: Standard)   Pulse 66   Temp 98 5 °F (36 9 °C) (Tympanic)   Resp 17   Ht 6' 1" (1 854 m)   Wt 102 kg (225 lb 9 6 oz)   SpO2 99%   BMI 29 76 kg/m²     Physical Exam   Constitutional: He is oriented to person, place, and time  He appears well-developed and well-nourished  No distress  Facial fullness from prednisone is improved      HENT:   Head: Normocephalic and atraumatic  Eyes: Conjunctivae are normal  No scleral icterus  Neck: Normal range of motion  Neck supple  Cardiovascular: Normal rate, regular rhythm and normal heart sounds  No murmur heard  Pulmonary/Chest: Effort normal and breath sounds normal  No respiratory distress  Abdominal: Soft  There is no tenderness  Musculoskeletal: Normal range of motion  He exhibits no edema or tenderness  Lymphadenopathy:     He has no cervical adenopathy     Neurological: He is alert and oriented to person, place, and time  No cranial nerve deficit  Skin: Skin is warm and dry  Chronic/mild ecchymoses    Psychiatric: He has a normal mood and affect  Vitals reviewed  Labs:  Lab Results   Component Value Date    WBC 3 90 (L) 08/13/2018    HGB 13 1 08/13/2018    HCT 40 6 08/13/2018    MCV 89 08/13/2018    PLT 65 (L) 08/13/2018     Lab Results   Component Value Date     08/13/2018    K 4 1 08/13/2018     (H) 08/13/2018    CO2 28 08/13/2018    ANIONGAP 2 (L) 08/13/2018    BUN 19 08/13/2018    CREATININE 1 00 08/13/2018    GLUCOSE 95 08/13/2018    GLUF 118 (H) 04/24/2018    CALCIUM 8 8 08/13/2018    AST 24 08/13/2018    ALT 29 08/13/2018    ALKPHOS 50 08/13/2018    PROT 5 7 (L) 08/13/2018    BILITOT 0 70 08/13/2018    EGFR 80 08/13/2018       Patient voiced understanding and agreement in the above discussion  Aware to contact our office with questions/symptoms in the interim

## 2018-08-20 ENCOUNTER — HOSPITAL ENCOUNTER (OUTPATIENT)
Dept: INFUSION CENTER | Facility: CLINIC | Age: 63
Discharge: HOME | End: 2018-08-20
Payer: MEDICARE

## 2018-08-20 VITALS
RESPIRATION RATE: 18 BRPM | HEART RATE: 71 BPM | DIASTOLIC BLOOD PRESSURE: 87 MMHG | SYSTOLIC BLOOD PRESSURE: 150 MMHG | TEMPERATURE: 98.8 F

## 2018-08-20 LAB
ALBUMIN SERPL BCP-MCNC: 3.5 G/DL (ref 3.5–5.7)
ALP SERPL-CCNC: 46 U/L (ref 46–116)
ALT SERPL W P-5'-P-CCNC: 25 U/L (ref 12–78)
ANION GAP SERPL CALCULATED.3IONS-SCNC: 2 MMOL/L (ref 4–13)
ANISOCYTOSIS BLD QL SMEAR: PRESENT
AST SERPL W P-5'-P-CCNC: 23 U/L (ref 5–45)
BASOPHILS # BLD AUTO: 0 THOUSAND/UL (ref 0–0.1)
BASOPHILS NFR MAR MANUAL: 0 % (ref 0–1)
BILIRUB SERPL-MCNC: 0.7 MG/DL (ref 0.2–1)
BUN SERPL-MCNC: 18 MG/DL (ref 5–25)
CALCIUM SERPL-MCNC: 8.9 MG/DL (ref 8.3–10.1)
CHLORIDE SERPL-SCNC: 108 MMOL/L (ref 98–108)
CO2 SERPL-SCNC: 29 MMOL/L (ref 21–32)
CREAT SERPL-MCNC: 1.2 MG/DL (ref 0.6–1.3)
EOSINOPHIL # BLD AUTO: 0.04 THOUSAND/UL (ref 0–0.61)
EOSINOPHIL NFR BLD MANUAL: 1 % (ref 0–6)
ERYTHROCYTE [DISTWIDTH] IN BLOOD BY AUTOMATED COUNT: 15.3 % (ref 11.6–15.1)
GFR SERPL CREATININE-BSD FRML MDRD: 64 ML/MIN/1.73SQ M
GLUCOSE SERPL-MCNC: 97 MG/DL (ref 65–140)
HCT VFR BLD AUTO: 42 % (ref 36.5–49.3)
HGB BLD-MCNC: 13.2 G/DL (ref 12–17)
LG PLATELETS BLD QL SMEAR: PRESENT
LYMPHOCYTES # BLD AUTO: 0.5 THOUSAND/UL (ref 0.6–4.47)
LYMPHOCYTES # BLD AUTO: 12 % (ref 14–44)
MCH RBC QN AUTO: 28.2 PG (ref 26.8–34.3)
MCHC RBC AUTO-ENTMCNC: 31.5 G/DL (ref 31.4–37.4)
MCV RBC AUTO: 89 FL (ref 82–98)
MONOCYTES # BLD AUTO: 0.13 THOUSAND/UL (ref 0–1.22)
MONOCYTES NFR BLD AUTO: 3 % (ref 4–12)
NEUTS BAND NFR BLD MANUAL: 0 % (ref 0–8)
NEUTS SEG # BLD: 3.53 THOUSAND/UL (ref 1.81–6.82)
NEUTS SEG NFR BLD AUTO: 84 % (ref 43–75)
OVALOCYTES BLD QL SMEAR: PRESENT
PLATELET # BLD AUTO: 43 THOUSANDS/UL (ref 149–390)
PLATELET BLD QL SMEAR: ABNORMAL
PMV BLD AUTO: 8.9 FL (ref 8.9–12.7)
POTASSIUM SERPL-SCNC: 3.7 MMOL/L (ref 3.5–5.3)
PROT SERPL-MCNC: 5.5 G/DL (ref 6.4–8.2)
RBC # BLD AUTO: 4.69 MILLION/UL (ref 3.88–5.62)
SODIUM SERPL-SCNC: 139 MMOL/L (ref 136–145)
TOTAL CELLS COUNTED SPEC: 100
WBC # BLD AUTO: 4.2 THOUSAND/UL (ref 4.31–10.16)
WBC NRBC COR # BLD: 4.2 THOUSAND/UL (ref 4.31–10.16)

## 2018-08-20 PROCEDURE — 85027 COMPLETE CBC AUTOMATED: CPT | Performed by: INTERNAL MEDICINE

## 2018-08-20 PROCEDURE — 85007 BL SMEAR W/DIFF WBC COUNT: CPT | Performed by: INTERNAL MEDICINE

## 2018-08-20 PROCEDURE — 80053 COMPREHEN METABOLIC PANEL: CPT | Performed by: INTERNAL MEDICINE

## 2018-08-20 NOTE — PLAN OF CARE
Problem: Potential for Falls  Goal: Patient will remain free of falls  INTERVENTIONS:  - Assess patient frequently for physical needs  -  Identify cognitive and physical deficits and behaviors that affect risk of falls    -  [place] fall precautions as indicated by assessment   - Educate patient/family on patient safety including physical limitations  - Instruct patient to call for assistance with activity based on assessment  - Modify environment to reduce risk of injury  - Consider OT/PT consult to assist with strengthening/mobility   Outcome: Progressing

## 2018-09-04 ENCOUNTER — HOSPITAL ENCOUNTER (OUTPATIENT)
Dept: INFUSION CENTER | Facility: CLINIC | Age: 63
Discharge: HOME | End: 2018-09-04
Payer: MEDICARE

## 2018-09-04 DIAGNOSIS — C91.10 CHRONIC LYMPHOCYTIC LEUKEMIA (HCC): ICD-10-CM

## 2018-09-04 DIAGNOSIS — C91.10 CLL (CHRONIC LYMPHOCYTIC LEUKEMIA) (HCC): Primary | ICD-10-CM

## 2018-09-04 LAB
ALBUMIN SERPL BCP-MCNC: 3.8 G/DL (ref 3.5–5.7)
ALP SERPL-CCNC: 48 U/L (ref 46–116)
ALT SERPL W P-5'-P-CCNC: 22 U/L (ref 12–78)
ANION GAP SERPL CALCULATED.3IONS-SCNC: 6 MMOL/L (ref 4–13)
ANISOCYTOSIS BLD QL SMEAR: PRESENT
AST SERPL W P-5'-P-CCNC: 26 U/L (ref 5–45)
BASOPHILS # BLD AUTO: 0 THOUSAND/UL (ref 0–0.1)
BASOPHILS NFR MAR MANUAL: 0 % (ref 0–1)
BILIRUB DIRECT SERPL-MCNC: 0.13 MG/DL (ref 0–0.2)
BILIRUB SERPL-MCNC: 0.7 MG/DL (ref 0.2–1)
BUN SERPL-MCNC: 20 MG/DL (ref 5–25)
CALCIUM SERPL-MCNC: 9.5 MG/DL (ref 8.3–10.1)
CHLORIDE SERPL-SCNC: 106 MMOL/L (ref 98–108)
CO2 SERPL-SCNC: 28 MMOL/L (ref 21–32)
CREAT SERPL-MCNC: 1.1 MG/DL (ref 0.6–1.3)
EOSINOPHIL # BLD AUTO: 0 THOUSAND/UL (ref 0–0.61)
EOSINOPHIL NFR BLD MANUAL: 0 % (ref 0–6)
ERYTHROCYTE [DISTWIDTH] IN BLOOD BY AUTOMATED COUNT: 15.1 % (ref 11.6–15.1)
GFR SERPL CREATININE-BSD FRML MDRD: 71 ML/MIN/1.73SQ M
GLUCOSE SERPL-MCNC: 97 MG/DL (ref 65–140)
HCT VFR BLD AUTO: 43.5 % (ref 36.5–49.3)
HGB BLD-MCNC: 13.7 G/DL (ref 12–17)
IGG SERPL-MCNC: 60 MG/DL (ref 700–1600)
LDH SERPL-CCNC: 224 U/L (ref 81–234)
LYMPHOCYTES # BLD AUTO: 0.75 THOUSAND/UL (ref 0.6–4.47)
LYMPHOCYTES # BLD AUTO: 25 % (ref 14–44)
MCH RBC QN AUTO: 28.1 PG (ref 26.8–34.3)
MCHC RBC AUTO-ENTMCNC: 31.4 G/DL (ref 31.4–37.4)
MCV RBC AUTO: 89 FL (ref 82–98)
MONOCYTES # BLD AUTO: 0.18 THOUSAND/UL (ref 0–1.22)
MONOCYTES NFR BLD AUTO: 6 % (ref 4–12)
NEUTS BAND NFR BLD MANUAL: 2 % (ref 0–8)
NEUTS SEG # BLD: 2.07 THOUSAND/UL (ref 1.81–6.82)
NEUTS SEG NFR BLD AUTO: 67 % (ref 43–75)
PLATELET # BLD AUTO: 61 THOUSANDS/UL (ref 149–390)
PLATELET BLD QL SMEAR: ABNORMAL
PMV BLD AUTO: 10 FL (ref 8.9–12.7)
POTASSIUM SERPL-SCNC: 4.1 MMOL/L (ref 3.5–5.3)
PROT SERPL-MCNC: 6 G/DL (ref 6.4–8.2)
RBC # BLD AUTO: 4.87 MILLION/UL (ref 3.88–5.62)
RETICS # AUTO: NORMAL 10*3/UL (ref 14356–105094)
RETICS # CALC: 1.63 % (ref 0.37–1.87)
SODIUM SERPL-SCNC: 140 MMOL/L (ref 136–145)
TOTAL CELLS COUNTED SPEC: 100
URATE SERPL-MCNC: 4.8 MG/DL (ref 4.2–8)
WBC # BLD AUTO: 3 THOUSAND/UL (ref 4.31–10.16)
WBC NRBC COR # BLD: 3 THOUSAND/UL (ref 4.31–10.16)

## 2018-09-04 PROCEDURE — 85045 AUTOMATED RETICULOCYTE COUNT: CPT

## 2018-09-04 PROCEDURE — 80053 COMPREHEN METABOLIC PANEL: CPT | Performed by: INTERNAL MEDICINE

## 2018-09-04 PROCEDURE — 82784 ASSAY IGA/IGD/IGG/IGM EACH: CPT

## 2018-09-04 PROCEDURE — 84550 ASSAY OF BLOOD/URIC ACID: CPT

## 2018-09-04 PROCEDURE — 83615 LACTATE (LD) (LDH) ENZYME: CPT

## 2018-09-04 PROCEDURE — 85007 BL SMEAR W/DIFF WBC COUNT: CPT | Performed by: INTERNAL MEDICINE

## 2018-09-04 PROCEDURE — 85027 COMPLETE CBC AUTOMATED: CPT | Performed by: INTERNAL MEDICINE

## 2018-09-04 PROCEDURE — 82248 BILIRUBIN DIRECT: CPT

## 2018-09-07 ENCOUNTER — TELEPHONE (OUTPATIENT)
Dept: INTERNAL MEDICINE CLINIC | Facility: CLINIC | Age: 63
End: 2018-09-07

## 2018-09-07 DIAGNOSIS — R52 PAIN: Primary | ICD-10-CM

## 2018-09-07 RX ORDER — KETOROLAC TROMETHAMINE 10 MG/1
10 TABLET, FILM COATED ORAL 2 TIMES DAILY PRN
Qty: 60 TABLET | Refills: 0 | Status: SHIPPED | OUTPATIENT
Start: 2018-09-07 | End: 2018-11-06

## 2018-09-07 NOTE — TELEPHONE ENCOUNTER
Geetha Zheng is calling to see if you can order a new script for him  The medication is Ketorolac 10mg tabs one tab twice daily  His pharmacy is [facility] in [facility]  Please call to let him know that status

## 2018-09-07 NOTE — PROGRESS NOTES
Pt without complaint, central labs drawn per orders, PAC flushed per protocol, pt tolerated well, left unit in stable condition, declines AVS today There are no preventive care reminders to display for this patient.    Patient is due for topics as listed above but declines Immunization(s) Influenza at this time.

## 2018-09-10 RX ORDER — ACETAMINOPHEN 325 MG/1
650 TABLET ORAL ONCE
Status: COMPLETED | OUTPATIENT
Start: 2018-09-11 | End: 2018-09-11

## 2018-09-10 RX ORDER — SODIUM CHLORIDE 9 MG/ML
20 INJECTION, SOLUTION INTRAVENOUS CONTINUOUS
Status: DISCONTINUED | OUTPATIENT
Start: 2018-09-11 | End: 2018-09-14 | Stop reason: HOSPADM

## 2018-09-11 ENCOUNTER — HOSPITAL ENCOUNTER (OUTPATIENT)
Dept: INFUSION CENTER | Facility: CLINIC | Age: 63
Discharge: HOME | End: 2018-09-11
Payer: MEDICARE

## 2018-09-11 VITALS
HEART RATE: 82 BPM | TEMPERATURE: 98.1 F | DIASTOLIC BLOOD PRESSURE: 72 MMHG | SYSTOLIC BLOOD PRESSURE: 142 MMHG | BODY MASS INDEX: 30.46 KG/M2 | WEIGHT: 224.87 LBS | RESPIRATION RATE: 18 BRPM | HEIGHT: 72 IN

## 2018-09-11 PROCEDURE — 96367 TX/PROPH/DG ADDL SEQ IV INF: CPT

## 2018-09-11 PROCEDURE — 96413 CHEMO IV INFUSION 1 HR: CPT

## 2018-09-11 PROCEDURE — 96415 CHEMO IV INFUSION ADDL HR: CPT

## 2018-09-11 RX ADMIN — OBINUTUZUMAB 1000 MG: 1000 INJECTION, SOLUTION, CONCENTRATE INTRAVENOUS at 09:50

## 2018-09-11 RX ADMIN — ACETAMINOPHEN 650 MG: 325 TABLET, FILM COATED ORAL at 08:43

## 2018-09-11 RX ADMIN — DEXAMETHASONE SODIUM PHOSPHATE 20 MG: 10 INJECTION, SOLUTION INTRAMUSCULAR; INTRAVENOUS at 08:44

## 2018-09-11 RX ADMIN — SODIUM CHLORIDE 20 ML/HR: 0.9 INJECTION, SOLUTION INTRAVENOUS at 08:44

## 2018-09-11 RX ADMIN — DIPHENHYDRAMINE HYDROCHLORIDE 25 MG: 50 INJECTION, SOLUTION INTRAMUSCULAR; INTRAVENOUS at 09:11

## 2018-09-11 NOTE — PLAN OF CARE
Problem: Potential for Falls  Goal: Patient will remain free of falls  INTERVENTIONS:  - Assess patient frequently for physical needs  -  Identify cognitive and physical deficits and behaviors that affect risk of falls    -  [place] fall precautions as indicated by assessment   - Educate patient/family on patient safety including physical limitations  - Instruct patient to call for assistance with activity based on assessment  - Modify environment to reduce risk of injury  - Consider OT/PT consult to assist with strengthening/mobility   Outcome: Progressing

## 2018-09-17 ENCOUNTER — HOSPITAL ENCOUNTER (OUTPATIENT)
Dept: INFUSION CENTER | Facility: CLINIC | Age: 63
Discharge: HOME | End: 2018-09-17
Payer: MEDICARE

## 2018-09-17 DIAGNOSIS — C91.10 CLL (CHRONIC LYMPHOCYTIC LEUKEMIA) (HCC): ICD-10-CM

## 2018-09-17 DIAGNOSIS — C91.10 CHRONIC LYMPHOCYTIC LEUKEMIA (HCC): ICD-10-CM

## 2018-09-17 LAB
ALBUMIN SERPL BCP-MCNC: 3.8 G/DL (ref 3.5–5.7)
ALP SERPL-CCNC: 36 U/L (ref 46–116)
ALT SERPL W P-5'-P-CCNC: 32 U/L (ref 12–78)
ANION GAP SERPL CALCULATED.3IONS-SCNC: 10 MMOL/L (ref 4–13)
ANISOCYTOSIS BLD QL SMEAR: PRESENT
AST SERPL W P-5'-P-CCNC: 28 U/L (ref 5–45)
BASOPHILS # BLD AUTO: 0 THOUSAND/UL (ref 0–0.1)
BASOPHILS NFR MAR MANUAL: 0 % (ref 0–1)
BILIRUB DIRECT SERPL-MCNC: 0.17 MG/DL (ref 0–0.2)
BILIRUB SERPL-MCNC: 0.9 MG/DL (ref 0.2–1)
BUN SERPL-MCNC: 22 MG/DL (ref 5–25)
CALCIUM SERPL-MCNC: 9 MG/DL (ref 8.3–10.1)
CHLORIDE SERPL-SCNC: 105 MMOL/L (ref 98–108)
CO2 SERPL-SCNC: 28 MMOL/L (ref 21–32)
CREAT SERPL-MCNC: 1.1 MG/DL (ref 0.6–1.3)
EOSINOPHIL # BLD AUTO: 0.08 THOUSAND/UL (ref 0–0.61)
EOSINOPHIL NFR BLD MANUAL: 2 % (ref 0–6)
ERYTHROCYTE [DISTWIDTH] IN BLOOD BY AUTOMATED COUNT: 15.2 % (ref 11.6–15.1)
GFR SERPL CREATININE-BSD FRML MDRD: 71 ML/MIN/1.73SQ M
GLUCOSE SERPL-MCNC: 106 MG/DL (ref 65–140)
HCT VFR BLD AUTO: 42.2 % (ref 36.5–49.3)
HGB BLD-MCNC: 13.3 G/DL (ref 12–17)
IGG SERPL-MCNC: 70 MG/DL (ref 700–1600)
LDH SERPL-CCNC: 228 U/L (ref 81–234)
LG PLATELETS BLD QL SMEAR: PRESENT
LYMPHOCYTES # BLD AUTO: 0.38 THOUSAND/UL (ref 0.6–4.47)
LYMPHOCYTES # BLD AUTO: 9 % (ref 14–44)
MCH RBC QN AUTO: 28.3 PG (ref 26.8–34.3)
MCHC RBC AUTO-ENTMCNC: 31.5 G/DL (ref 31.4–37.4)
MCV RBC AUTO: 90 FL (ref 82–98)
MONOCYTES # BLD AUTO: 0.08 THOUSAND/UL (ref 0–1.22)
MONOCYTES NFR BLD AUTO: 2 % (ref 4–12)
NEUTS BAND NFR BLD MANUAL: 2 % (ref 0–8)
NEUTS SEG # BLD: 3.65 THOUSAND/UL (ref 1.81–6.82)
NEUTS SEG NFR BLD AUTO: 85 % (ref 43–75)
OVALOCYTES BLD QL SMEAR: PRESENT
PLATELET # BLD AUTO: 51 THOUSANDS/UL (ref 149–390)
PLATELET BLD QL SMEAR: ABNORMAL
PMV BLD AUTO: 9.7 FL (ref 8.9–12.7)
POTASSIUM SERPL-SCNC: 3.6 MMOL/L (ref 3.5–5.3)
PROT SERPL-MCNC: 5.7 G/DL (ref 6.4–8.2)
RBC # BLD AUTO: 4.69 MILLION/UL (ref 3.88–5.62)
RETICS # AUTO: ABNORMAL 10*3/UL (ref 14356–105094)
RETICS # CALC: 2.23 % (ref 0.37–1.87)
SODIUM SERPL-SCNC: 143 MMOL/L (ref 136–145)
TOTAL CELLS COUNTED SPEC: 100
URATE SERPL-MCNC: 5.9 MG/DL (ref 4.2–8)
WBC # BLD AUTO: 4.2 THOUSAND/UL (ref 4.31–10.16)
WBC NRBC COR # BLD: 4.2 THOUSAND/UL (ref 4.31–10.16)

## 2018-09-17 PROCEDURE — 84550 ASSAY OF BLOOD/URIC ACID: CPT

## 2018-09-17 PROCEDURE — 85045 AUTOMATED RETICULOCYTE COUNT: CPT

## 2018-09-17 PROCEDURE — 83615 LACTATE (LD) (LDH) ENZYME: CPT

## 2018-09-17 PROCEDURE — 80053 COMPREHEN METABOLIC PANEL: CPT

## 2018-09-17 PROCEDURE — 82248 BILIRUBIN DIRECT: CPT

## 2018-09-17 PROCEDURE — 82784 ASSAY IGA/IGD/IGG/IGM EACH: CPT

## 2018-09-17 PROCEDURE — 85007 BL SMEAR W/DIFF WBC COUNT: CPT

## 2018-09-17 PROCEDURE — 85027 COMPLETE CBC AUTOMATED: CPT

## 2018-09-17 NOTE — PLAN OF CARE
Problem: Potential for Falls  Goal: Patient will remain free of falls  INTERVENTIONS:  - Assess patient frequently for physical needs  -  Identify cognitive and physical deficits and behaviors that affect risk of falls    -  [place] fall precautions as indicated by assessment   - Educate patient/family on patient safety including physical limitations  - Instruct patient to call for assistance with activity based on assessment  - Modify environment to reduce risk of injury  - Consider OT/PT consult to assist with strengthening/mobility   Outcome: Progressing

## 2018-09-18 ENCOUNTER — OFFICE VISIT (OUTPATIENT)
Dept: OBGYN CLINIC | Facility: MEDICAL CENTER | Age: 63
End: 2018-09-18
Payer: MEDICARE

## 2018-09-18 VITALS
DIASTOLIC BLOOD PRESSURE: 83 MMHG | HEART RATE: 73 BPM | BODY MASS INDEX: 29.16 KG/M2 | SYSTOLIC BLOOD PRESSURE: 134 MMHG | WEIGHT: 220 LBS | HEIGHT: 73 IN

## 2018-09-18 DIAGNOSIS — M25.511 ACUTE PAIN OF BOTH SHOULDERS: Primary | ICD-10-CM

## 2018-09-18 DIAGNOSIS — M25.512 ACUTE PAIN OF BOTH SHOULDERS: Primary | ICD-10-CM

## 2018-09-18 PROCEDURE — 99213 OFFICE O/P EST LOW 20 MIN: CPT | Performed by: ORTHOPAEDIC SURGERY

## 2018-09-18 NOTE — PROGRESS NOTES
Orthopaedic Surgery - Office Note  Lorel Severance (58 y o  male)   : 1955   MRN: [id number]  Encounter Date: 2018    Chief Complaint   Patient presents with    Left Shoulder - Follow-up    Right Shoulder - Follow-up       Assessment / Plan  Right shoulder pain and weakness, consistent with rotator cuff tendinitis vs tear    · Begin outpatient PT for cuff strengthening and scapular stabilizing  · Anti-inflammatories or Tylenol prn pain  Return in about 6 weeks (around 10/30/2018)  History of Present Illness  Lorel Severance is a 58 y o  male who presents for follow up of right shoulder pain since about 2018  There was no injury  He was seen by me in May 2018 at which point his shoulder pain seemed to be related to his chemotherapy medications  However, he has continued to have right shoulder lateral pain that does not radiate  It is worse with reaching overhead and any use of the right arm  He does have night time pain  Denies numbness in the RUE  Review of Systems  Pertinent items are noted in HPI  All other systems were reviewed and are negative  Physical Exam  /83   Pulse 73   Ht 6' 1" (1 854 m)   Wt 99 8 kg (220 lb)   BMI 29 03 kg/m²   Cons: Appears well  No apparent distress  Psych: Alert  Oriented x3  Mood and affect normal   Eyes: PERRLA, EOMI  Resp: Normal effort  No audible wheezing or stridor  CV: Palpable pulse  No discernable arrhythmia  No LE edema  Lymph:  No palpable cervical, axillary, or inguinal lymphadenopathy  Skin: Warm  No palpable masses  No visible lesions  Neuro: Normal muscle tone  Normal and symmetric DTR's  Right Shoulder Exam  Alignment / Posture:  mild scapular protraction  Inspection:  No swelling  No muscle atrophy  Palpation:  moderate subacromial tenderness  No warmth  ROM:  Shoulder   Shoulder ER 70  Shoulder IR T6   Strength:  Supraspinatus 4+/5  Infraspinatus 4+/5  Subscapularis 5/5    Stability:  No objective shoulder instability  Tests: (+) Porras  (+) Neer  (+) Painful arc  (-) Drop arm  (-) Belly press  Neurovascular:  Sensation intact in Ax/R/M/U nerve distributions  2+ radial pulse  Studies Reviewed  No studies to review    Procedures  No procedures today  Medical, Surgical, Family, and Social History  The patient's medical history, family history, and social history, were reviewed and updated as appropriate  Past Medical History:   Diagnosis Date    CAD (coronary artery disease) 2004    Cellulitis of scalp     CKD (chronic kidney disease) stage 3, GFR 30-59 ml/min     [facility]ocytic [facility]) [facility] [address] [facility]     COPD (chronic obstructive pulmonary disease) ([facility] [address] [facility]     [facility]     Dyslipidemia     H/O blood clots     Hemolytic anemia (HCC)     History of TIA (transient ischemic attack)     Hyperlipidemia     Hypertension     Multiple gastric ulcers     Myocardial infarction (HCC)     acute    Neutropenia (HCC)     Recurrent sinusitis     Sleep apnea     Thrombocytopenia (HCC)     Vertigo        Past Surgical History:   Procedure Laterality Date    ARTHROSCOPIC REPAIR ACL      lt knee    CARDIAC SURGERY      cardiac cath with stent    FOOT SURGERY      HAND SURGERY      KNEE ARTHROSCOPY      OTHER SURGICAL HISTORY      cardiac cath lesion 1, 1st adjunct treat device: stent    PORTACATH PLACEMENT      [place] ESOPHAGOGASTRODUODENOSCOPY TRANSORAL DIAGNOSTIC N/A 10/5/2017    Procedure: ESOPHAGOGASTRODUODENOSCOPY (EGD) with bx;  Surgeon: Quin Camacho DO;  [facility]; Service: Gastroenterology    AR ESOPHAGOGASTRODUODENOSCOPY TRANSORAL DIAGNOSTIC N/A 3/8/2018    Procedure: ESOPHAGOGASTRODUODENOSCOPY (EGD) with biopsy;  Surgeon: Quin Camacho DO;  [facility];   Service: Gastroenterology    AR ESOPHAGOGASTRODUODENOSCOPY TRANSORAL DIAGNOSTIC N/A 6/20/2018    Procedure: ESOPHAGOGASTRODUODENOSCOPY (EGD) with Dilation;  Surgeon: Quin Camacho DO; [facility]; Service: Gastroenterology    [place] ESOPHAGOSCOPY FLEXIBLE TRANSORAL WITH BIOPSY N/A 9/2/2016    Procedure: ESOPHAGOGASTRODUODENOSCOPY (EGD); ESOPHAGEAL DILATION; ESOPHAGEAL BIOPSY;  Surgeon: Martha Tolbert MD;  Location:  MAIN OR;  Service: Thoracic    TONSILLECTOMY      UPPER GASTROINTESTINAL ENDOSCOPY      x 6       Family History   Problem Relation Age of Onset    Leukemia Mother         chronic    Colon polyps Mother         sidmoid    No Known Problems Father        Social History     Occupational History    Not on file       Social History Main Topics    Smoking status: Former Smoker    Smokeless tobacco: Never Used      Comment: pt refuses to answer question; current every day smoker as per  [employer]    Alcohol use No      Comment: social use as per [employer]    Drug use: No    Sexual activity: Not on file       Allergies   Allergen Reactions    Heparin Other (See Comments)     Other reaction(s): Blood Disorders  clot    Macrolides And Ketolides Other (See Comments)     Interacts with other meds he is taking         Current Outpatient Prescriptions:     acyclovir (ZOVIRAX) 400 MG tablet, Take 400 mg by mouth 2 (two) times a day, Disp: , Rfl:     aspirin 81 MG tablet, Take 81 mg by mouth every other day Every other day , Disp: , Rfl:     citalopram (CeleXA) 40 mg tablet, Take 40 mg by mouth daily, Disp: , Rfl:     fenofibrate (TRIGLIDE) 50 MG tablet, Take 134 mg by mouth daily  , Disp: , Rfl:     folic acid (FOLVITE) 1 mg tablet, Take 1 mg by mouth daily, Disp: , Rfl:     furosemide (LASIX) 40 mg tablet, furosemide 40 mg tablet, Disp: , Rfl:     gabapentin (NEURONTIN) 600 MG tablet, Take 600 mg by mouth 2 (two) times a day  , Disp: , Rfl:     glucose monitoring kit (FREESTYLE) monitoring kit, 1 each by Does not apply route as needed ( ) E 11 9, Disp: 1 each, Rfl: 0    insulin lispro (HumaLOG) 100 units/mL injection, Inject 10 Units under the skin 3 (three) times a day with meals, Disp: , Rfl: 0    ketorolac (TORADOL) 10 mg tablet, Take 1 tablet (10 mg total) by mouth 2 (two) times a day as needed for moderate pain, Disp: 60 tablet, Rfl: 0    Lancets (FREESTYLE) lancets, Use as instructed--test 2 times a day  E 11 9, Disp: 100 each, Rfl: 0    multivitamin (THERAGRAN) TABS, Take 1 tablet by mouth daily, Disp: , Rfl:     obinutuzumab (GAZYVA) 1000 mg/40 mL SOLN, Infuse into a venous catheter, Disp: , Rfl:     pantoprazole (PROTONIX) 40 mg tablet, Take 1 tablet (40 mg total) by mouth daily, Disp: 30 tablet, Rfl: 0    predniSONE 10 mg tablet, Take 1 tablet (10 mg total) by mouth daily, Disp: 30 tablet, Rfl: 2    sodium chloride, PF, 0 9 %, 10 mL by Intracatheter route see administration instructions 10mL into port before and after ampicillin doses, Disp: , Rfl: 0    VENTOLIN  (90 Base) MCG/ACT inhaler, INHALE 2 PUFFS 4 TIMES A DAY AS NEEDED, Disp: , Rfl: 3    Ibrutinib 140 MG TABS, Take 140 mg by mouth daily for 28 days, Disp: 30 tablet, Rfl: 0      Mari Leahy MD    Scribe Attestation    I,:    am acting as a scribe while in the presence of the attending physician :        I,:    personally performed the services described in this documentation    as scribed in my presence :

## 2018-09-19 ENCOUNTER — OFFICE VISIT (OUTPATIENT)
Dept: HEMATOLOGY ONCOLOGY | Facility: CLINIC | Age: 63
End: 2018-09-19
Payer: MEDICARE

## 2018-09-19 VITALS
WEIGHT: 224 LBS | HEART RATE: 66 BPM | SYSTOLIC BLOOD PRESSURE: 128 MMHG | HEIGHT: 73 IN | OXYGEN SATURATION: 97 % | DIASTOLIC BLOOD PRESSURE: 86 MMHG | BODY MASS INDEX: 29.69 KG/M2 | RESPIRATION RATE: 18 BRPM | TEMPERATURE: 98.2 F

## 2018-09-19 DIAGNOSIS — D59.19 OTHER AUTOIMMUNE HEMOLYTIC ANEMIAS: ICD-10-CM

## 2018-09-19 DIAGNOSIS — G89.3 CANCER RELATED PAIN: Primary | ICD-10-CM

## 2018-09-19 DIAGNOSIS — C91.10 CLL (CHRONIC LYMPHOCYTIC LEUKEMIA) (HCC): ICD-10-CM

## 2018-09-19 DIAGNOSIS — D69.6 THROMBOCYTOPENIA (HCC): ICD-10-CM

## 2018-09-19 DIAGNOSIS — M25.50 ARTHRALGIA OF MULTIPLE JOINTS: ICD-10-CM

## 2018-09-19 PROCEDURE — 99214 OFFICE O/P EST MOD 30 MIN: CPT | Performed by: PHYSICIAN ASSISTANT

## 2018-09-19 RX ORDER — OXYCODONE HYDROCHLORIDE 10 MG/1
10 TABLET ORAL EVERY 6 HOURS PRN
Qty: 90 TABLET | Refills: 0 | Status: SHIPPED | OUTPATIENT
Start: 2018-09-19 | End: 2019-09-03 | Stop reason: SDUPTHER

## 2018-09-19 NOTE — PROGRESS NOTES
Hematology/Oncology Outpatient Follow-up  Cintia Viramontes 58 y o  male [id number]    Date:  9/19/2018      Assessment and Plan:  1  CLL (chronic lymphocytic leukemia) ([facility] [address] )  Patient is on treatment with Ibrutinib 140 mg PO daily and Gazayva 1000 mg every 4 weeks  He continues to tolerate well  Hemoglobin remains stable  Platelets are also stable       He will have CBC every other week at this time  2  Other autoimmune hemolytic anemias (HCC)  Hemoglobin remains stable  One month ago patient decided to decrease to 5 mg prednisone daily on his own  He continues on this at this time  3  Thrombocytopenia ([facility] )  Secondary to chemotherapy  This remains stable  4  Cancer related pain, 5  Arthralgia of multiple joints  This is likely related to patient's ibrutinib  Incidence of arthralgia in patient's with ibrutinib is 23%; 27% musculoskeletal pain  He did see orthopedics yesterday for his left shoulder pain which he did have prior to ibrutinib but now is worsening  He will be beginning physical therapy for this  He it is not recommended that he takes Aleve or other NSAIDs due to his history of gastric ulcers  Pain medication was provided when Tylenol is not effective  - oxyCODONE (ROXICODONE) 10 MG TABS; Take 1 tablet (10 mg total) by mouth every 6 (six) hours as needed for moderate pain For ongoing pain control Max Daily Amount: 40 mg  Dispense: 90 tablet; Refill: 0    HPI:  On 3/20/17 patient found to have hemoglobin of 6 2, ,000  He was admitted to [address] [address] for blood transfusion and plan to transfer to [address] [place] [address]  On 3/20/17 WBC count 195,000, hemoglobin 4 9, platelet count 65  Direct coomb's was positive with undetectable haptoglobin  He was given 2 units of PRBCs, Solu-Medrol 1 g ×3 days and IVIG 1 g/kg daily ×3 days  His hemoglobin continued to drop   Bone marrow biopsy on 3/21 showed marrow cellularity of greater than 95% almost replaced by small lymphocytes, lambda light chain restricted, CD20 positive, CD19 positive, CD23 positive partially expressing CD11c and CD25 and CD5 positive and negative for CD 79 and CD38  He was treated with single dose of chlorambucil to control his CLL   3/22 second dose of chlorambucil, also Rituxan 375 mg/M ² autoimmune hemolytic anemia  WBC continued to rise, 266,000  Patient initiated with Venetoclax 20 mg daily  Tumor lysis monitored every 8 hours; given aggressive hydration and Lasix and remained euvolemic  3/24-WBC dropped to 112,000  3/25- WBC 63,000  3/26-WBC 15,000, , platelet count 38,668  Patient became febrile, 101 3  The cefepime initiated Venetoclax held  3/28-patient fell from bed, CT head negative  ANC 1200, cefepime discontinued and Levaquin 75 mg daily started sliding 3/29 given second dose of rituximab 375 mg/m ²   3/30-WBC meg to 14 5 thousand, platelets 28,967, Venetoclax restarted 10 mg every other day  3/31 WBC 4 4, , platelet count 97,311  4/1-4/4: WBC maintained less than 10,000, ANC and platelet count meg  He was discharged on Venetoclax 10 mg every other day  He was instructed to discontinue simvastatin, Ranexa, aspirin, metoprolol 50 mg q  day, losartan 50 mg q  day, Plavix 75 mg q  day, folic acid 734 µg b i d , prednisone 60 mg, allopurinol  He was advised to continue Levaquin 750 mg daily for 10 days, Lexapro 10 mg daily, fenofibrate 50 mg daily, gabapentin 100 mg twice daily, Protonix 40 mg daily     Imaging studies performed at [facility]:  Patient had echocardiogram while inpatient at [facility] on 3/21  This study was unremarkable  CT chest abdomen pelvis without contrast on 3/24 showed stable pulmonary nodules, patchy groundglass densities of lower lobes previously identified and which may represent volume loss or early infiltrate  Persistent diffuse bronchial wall thickening suggesting acute/chronic bronchitis changes  No bronchiectasis  No masses  Stable lymphadenopathy of mediastinum  Stable axillary lymphadenopathy  Splenomegaly 23 9 cm  Extensive lymphadenopathy throughout the entire retroperitoneal, small bowel mesentery, and pelvis  Largest lymph node in right lower quadrant measured 4 6 x 4 8  Stable enlarged left para-aortic lymph node measuring 6 2 x 6 8   4/12/17: Patient received one dose of Rituxan on 4/7/17  Venetoclax 10 mg every other day 4/5/17 - 4/9/17  Venetoclax 10 mg every day beginning 4/10/17  Monitoring CBC 3 times per week  4/28/17: patient had follow-up appointment at [facility] with Dr Roddy Woods  She recommended to slowly increase dose of veneteclax  Also, she recommended as he has had numerous treatments with Rituxan, if antibody directed therapy becomes indicated in the future recommendation was with [facility]  She will be seeing her on a p r n  Basis      July 2017- Hemolysis  Disease not under control with veneteclax  Started prednisone 60 mg daily, folic acid, IBRUTINIB 652 mg daily and Gazyva       Sept 2017- 9/18-9/20 patient was admitted due to uncontrolled hyperglycemia with new onset DM type II due to chronic prednisone use for CLL/hemolytic anemia; also found to have profound neutropenia  Ibrutinib dose was reduced to 280 mg daily      October 2017- 10/7/17 - 10/14/17 patient was admitted due to cellulitis on his scalp     Dec 2017- Espiridion Diver decreased to 140 mg PO daily due to thrombocytopenia      4/23 - 4/27 patient was admitted due to listeria bacteremia  He was discharged on IV ampicillin  Echo negative for vegetations  [facility] and [facility] was on hold at that time  Repeat CBC on 5/14/18 showed normal ANC, and hemoglobin  Platelets adequate at 83K    Interval history:    ROS: Review of Systems   Constitutional: Negative for activity change, appetite change, chills, fatigue, fever and unexpected weight change  HENT: Negative for mouth sores and nosebleeds      Respiratory: Negative for cough and shortness of breath  Cardiovascular: Negative for chest pain, palpitations and leg swelling  Gastrointestinal: Negative for abdominal pain, blood in stool, constipation, diarrhea, nausea and vomiting  Genitourinary: Negative for difficulty urinating, dysuria and hematuria  Musculoskeletal: Positive for arthralgias  Skin: Negative  Neurological: Negative for dizziness, weakness, light-headedness, numbness and headaches  Hematological: Negative for adenopathy  Bruises/bleeds easily  Psychiatric/Behavioral: Negative  Past Medical History:   Diagnosis Date    CAD (coronary artery disease) 2004    Cellulitis of scalp     CKD (chronic kidney disease) stage 3, GFR 30-59 ml/min     CLL (chronic lymphocytic leukemia) ([facility] [address] )     COPD (chronic obstructive pulmonary disease) ([facility] )     Diabetes mellitus ([facility] )     Dyslipidemia     H/O blood clots     Hemolytic anemia (HCC)     History of TIA (transient ischemic attack)     Hyperlipidemia     Hypertension     Multiple gastric ulcers     Myocardial infarction (HCC)     acute    Neutropenia (HCC)     Recurrent sinusitis     Sleep apnea     Thrombocytopenia (HCC)     Vertigo        Past Surgical History:   Procedure Laterality Date    ARTHROSCOPIC REPAIR ACL      lt knee    CARDIAC SURGERY      cardiac cath with stent    FOOT SURGERY      HAND SURGERY      KNEE ARTHROSCOPY      OTHER SURGICAL HISTORY      cardiac cath lesion 1, 1st adjunct treat device: stent    PORTACATH PLACEMENT      PA ESOPHAGOGASTRODUODENOSCOPY TRANSORAL DIAGNOSTIC N/A 10/5/2017    Procedure: ESOPHAGOGASTRODUODENOSCOPY (EGD) with bx;  Surgeon: Tu Mccormack DO;  Location: [facility]; Service: Gastroenterology    PA ESOPHAGOGASTRODUODENOSCOPY TRANSORAL DIAGNOSTIC N/A 3/8/2018    Procedure: ESOPHAGOGASTRODUODENOSCOPY (EGD) with biopsy;  Surgeon: Tu Mccormack DO;  Location: [facility];   Service: Gastroenterology    PA ESOPHAGOGASTRODUODENOSCOPY TRANSORAL DIAGNOSTIC N/A 6/20/2018    Procedure: ESOPHAGOGASTRODUODENOSCOPY (EGD) with Dilation;  Surgeon: Fidelia Barrera DO;  Location: [facility];   Service: Gastroenterology    MA ESOPHAGOSCOPY FLEXIBLE TRANSORAL WITH BIOPSY N/A 9/2/2016    Procedure: ESOPHAGOGASTRODUODENOSCOPY (EGD); ESOPHAGEAL DILATION; ESOPHAGEAL BIOPSY;  Surgeon: Sherri Pascual MD;  Location:  MAIN OR;  Service: Thoracic    TONSILLECTOMY      UPPER GASTROINTESTINAL ENDOSCOPY      x 6       Social History     Social History    Marital status: /Civil Union     Spouse name: N/A    Number of children: N/A    Years of education: N/A     Social History Main Topics    Smoking status: Former Smoker    Smokeless tobacco: Never Used      Comment: pt refuses to answer question; current every day smoker as per  [employer]    Alcohol use No      Comment: social use as per [employer]    Drug use: No    Sexual activity: Not on file     Other Topics Concern    Not on file     Social History Narrative    No narrative on file       Family History   Problem Relation Age of Onset    Leukemia Mother         chronic    Colon polyps Mother         sidmoid    No Known Problems Father        Allergies   Allergen Reactions    Heparin Other (See Comments)     Other reaction(s): Blood Disorders  clot    Macrolides And Ketolides Other (See Comments)     Interacts with other meds he is taking         Current Outpatient Prescriptions:     acyclovir (ZOVIRAX) 400 MG tablet, Take 400 mg by mouth 2 (two) times a day, Disp: , Rfl:     aspirin 81 MG tablet, Take 81 mg by mouth every other day Every other day , Disp: , Rfl:     citalopram (CeleXA) 40 mg tablet, Take 40 mg by mouth daily, Disp: , Rfl:     fenofibrate (TRIGLIDE) 50 MG tablet, Take 134 mg by mouth daily  , Disp: , Rfl:     folic acid (FOLVITE) 1 mg tablet, Take 1 mg by mouth daily, Disp: , Rfl:     furosemide (LASIX) 40 mg tablet, furosemide 40 mg tablet, Disp: , Rfl:     gabapentin (NEURONTIN) 600 MG tablet, Take 600 mg by mouth 2 (two) times a day  , Disp: , Rfl:     glucose monitoring kit (FREESTYLE) monitoring kit, 1 each by Does not apply route as needed ( ) E 11 9, Disp: 1 each, Rfl: 0    insulin lispro (HumaLOG) 100 units/mL injection, Inject 10 Units under the skin 3 (three) times a day with meals, Disp: , Rfl: 0    ketorolac (TORADOL) 10 mg tablet, Take 1 tablet (10 mg total) by mouth 2 (two) times a day as needed for moderate pain, Disp: 60 tablet, Rfl: 0    Lancets (FREESTYLE) lancets, Use as instructed--test 2 times a day  E 11 9, Disp: 100 each, Rfl: 0    multivitamin (THERAGRAN) TABS, Take 1 tablet by mouth daily, Disp: , Rfl:     obinutuzumab (GAZYVA) 1000 mg/40 mL SOLN, Infuse into a venous catheter, Disp: , Rfl:     pantoprazole (PROTONIX) 40 mg tablet, Take 1 tablet (40 mg total) by mouth daily, Disp: 30 tablet, Rfl: 0    predniSONE 10 mg tablet, Take 1 tablet (10 mg total) by mouth daily, Disp: 30 tablet, Rfl: 2    sodium chloride, PF, 0 9 %, 10 mL by Intracatheter route see administration instructions 10mL into port before and after ampicillin doses, Disp: , Rfl: 0    VENTOLIN  (90 Base) MCG/ACT inhaler, INHALE 2 PUFFS 4 TIMES A DAY AS NEEDED, Disp: , Rfl: 3    Ibrutinib 140 MG TABS, Take 140 mg by mouth daily for 28 days, Disp: 30 tablet, Rfl: 0    oxyCODONE (ROXICODONE) 10 MG TABS, Take 1 tablet (10 mg total) by mouth every 6 (six) hours as needed for moderate pain For ongoing pain control Max Daily Amount: 40 mg, Disp: 90 tablet, Rfl: 0      Physical Exam:  /86 (BP Location: Left arm, Patient Position: Sitting, Cuff Size: Standard)   Pulse 66   Temp 98 2 °F (36 8 °C) (Tympanic)   Resp 18   Ht 6' 1" (1 854 m)   Wt 102 kg (224 lb)   SpO2 97%   BMI 29 55 kg/m²     Physical Exam   Constitutional: He is oriented to person, place, and time  He appears well-developed and well-nourished  No distress  HENT:   Head: Normocephalic and atraumatic     Eyes: Conjunctivae are normal  No scleral icterus  Neck: Normal range of motion  Neck supple  Cardiovascular: Normal rate, regular rhythm and normal heart sounds  No murmur heard  Pulmonary/Chest: Effort normal and breath sounds normal  No respiratory distress  Abdominal: Soft  There is no tenderness  Musculoskeletal: Normal range of motion  He exhibits no edema or tenderness  Lymphadenopathy:     He has no cervical adenopathy  Neurological: He is alert and oriented to person, place, and time  No cranial nerve deficit  Skin: Skin is warm and dry  Bruising on arms    Psychiatric: He has a normal mood and affect  Vitals reviewed  Labs:  Lab Results   Component Value Date    WBC 4 20 (L) 09/17/2018    HGB 13 3 09/17/2018    HCT 42 2 09/17/2018    MCV 90 09/17/2018    PLT 51 (L) 09/17/2018     Lab Results   Component Value Date     09/17/2018    K 3 6 09/17/2018     09/17/2018    CO2 28 09/17/2018    ANIONGAP 8 12/29/2015    BUN 22 09/17/2018    CREATININE 1 10 09/17/2018    GLUCOSE 109 12/29/2015    GLUF 118 (H) 04/24/2018    CALCIUM 9 0 09/17/2018    AST 28 09/17/2018    ALT 32 09/17/2018    ALKPHOS 36 (L) 09/17/2018    PROT 5 9 (L) 12/29/2015    BILITOT 0 7 12/29/2015    EGFR 71 09/17/2018     @RESULTFAST(TSH)@    Patient voiced understanding and agreement in the above discussion  Aware to contact our office with questions/symptoms in the interim

## 2018-09-21 ENCOUNTER — EVALUATION (OUTPATIENT)
Dept: PHYSICAL THERAPY | Facility: CLINIC | Age: 63
End: 2018-09-21
Payer: MEDICARE

## 2018-09-21 DIAGNOSIS — M25.511 PAIN IN JOINT OF RIGHT SHOULDER REGION: Primary | ICD-10-CM

## 2018-09-21 PROCEDURE — 97161 PT EVAL LOW COMPLEX 20 MIN: CPT | Performed by: PHYSICAL THERAPIST

## 2018-09-21 PROCEDURE — G8990 OTHER PT/OT CURRENT STATUS: HCPCS | Performed by: PHYSICAL THERAPIST

## 2018-09-21 PROCEDURE — G8991 OTHER PT/OT GOAL STATUS: HCPCS | Performed by: PHYSICAL THERAPIST

## 2018-09-21 NOTE — PROGRESS NOTES
PT Evaluation     Today's date: 2018  Patient name: Katelin Raya  :   MRN: [id number]  Referring provider: Arnaldo Ng MD  Dx:   Encounter Diagnosis     ICD-10-CM    1  Pain in joint of right shoulder region M25 511                   Assessment    Assessment details: Patient is a 58 y o  male who  presents with pain, range of motion loss, weakness associated with a diagnosis of  R shoulder subacromial impingement and associated tendinitis and bursitis  Functional limitations as a result of impairments  Patient would benefit from course of skilled physical therapy to address above listed impairments in an effort to improve function  Understanding of Dx/Px/POC: excellent  Goals  Short Term Goals:  1) Pain : Decrease R shoulder pain to 2/10 at worst x 1 continuous week within 3 weeks  2) AROM: Improve AROM by at least 10 degrees flexion/abduction within 3 weeks  3) Strength: Improved R UE strength by at least 1/2 grade for all noted as weak within 3 weeks  4) Function: Improved FOTO score from IE within 3 weeks, patient to note greater use of arm for ADLs within 3 weeks    LongTerm Goals:  1) Pain : Eliminate R shoulder pain  x 1 continuous week within 4-6 weeks  2) AROM: Improve R AROM to at least 170 degrees flexion/abduction within 4-6 weeks  3) Strength: Improved R UE strength to 5/5 within 6 weeks  4) Function: Improved FOTO score to at least 58 ; no reported difficulty with ADLs within 6 weeks  5) (I) with HEP within 6 weeks        Plan  Patient would benefit from: skilled physical therapy  Referral necessary: No  Planned modality interventions: TENS, cryotherapy and thermotherapy: hydrocollator packs  Planned therapy interventions: home exercise program, stretching, strengthening, therapeutic activities, therapeutic exercise and manual therapy  Frequency: 2-3x's per week x 4-6 weeks    Plan of Care beginning date: 2018  Plan of Care expiration date: 2018        Subjective Evaluation    History of Present Illness  Mechanism of injury: Patient is a 58 y o  old male who presents for an initial outpatient physical therapy consultation regarding his R shoulder pain  Pt reports at least years or problems with R shoulder  If I do anything strenuous my shoulder get out of what and I am "out of whack  for three weeks "    Feels weakness in R shoulder  Pt tells me " I want an MRI "      Pain  Current pain ratin  At best pain ratin  At worst pain rating: 10  Exacerbated by: pushing, pulling, reaching  Social Support    Employment status: not working  Hand dominance: right    Patient Goals  Patient goals for therapy: decreased pain, increased strength and independence with ADLs/IADLs          Objective     Tenderness     Right Shoulder  Tenderness in the supraspinatus tendon  Active Range of Motion   Left Shoulder   Flexion: 170 degrees   Abduction: 170 degrees   External rotation 90°: 90 degrees   Internal rotation BTB: T7     Right Shoulder   Normal active range of motion  Flexion: 150 degrees with pain  Abduction: 150 degrees with pain  External rotation 90°: 90 degrees  Internal rotation BTB: T8     Passive Range of Motion     Right Shoulder   Flexion: 160 degrees with pain  Abduction: 155 degrees with pain  External rotation 90°: 95 degrees with pain  Internal rotation 90°: 75 degrees     Scapular Mobility   Left Shoulder   Scapular Mobility with Shoulder to 90° FF   Scapular winging: minimal    Right Shoulder   Scapular Mobility with Shoulder to 90° FF   Scapular winging: minimal    Strength/Myotome Testing     Right Shoulder     Planes of Motion   Flexion: 5   Extension: 5   Abduction: 4+ (pain)   Adduction: 5   External rotation at 0°: 5   Internal rotation at 0°: 5     Isolated Muscles   Biceps: 5   Lower trapezius: 4+   Rhomboids: 4+   Serratus anterior: 5   Triceps: 5     Tests     Right Shoulder   Positive lynne and Ayo's  Negative drop arm and empty can             Precautions: DM, HTN      Daily Treatment Diary       Manuals 9/21            PROM R shoulder                                                    Exercise Diary              UBE             Ball circles at wall             AROM Scaption             SL ER             SL flexion/extension             SA punchups             Prone rhomboids             Prone Lower traps                                       Tband Scap 4                                                                                                                                                            Modalities             Cryo prn

## 2018-09-24 ENCOUNTER — OFFICE VISIT (OUTPATIENT)
Dept: PHYSICAL THERAPY | Facility: CLINIC | Age: 63
End: 2018-09-24
Payer: MEDICARE

## 2018-09-24 DIAGNOSIS — M25.511 PAIN IN JOINT OF RIGHT SHOULDER REGION: Primary | ICD-10-CM

## 2018-09-24 PROCEDURE — 97110 THERAPEUTIC EXERCISES: CPT | Performed by: PHYSICAL THERAPIST

## 2018-09-24 NOTE — PROGRESS NOTES
Daily Note     Today's date: 2018  Patient name: Anamaria Santos  :   MRN: [id number]  Referring provider: Kary Cornelius MD  Dx:   Encounter Diagnosis     ICD-10-CM    1  Pain in joint of right shoulder region M25 511                   Subjective: Pt feels his whole body is sore from chemotherapy  Did not do HEP over the weekend  Objective: See treatment diary below  Progressed UE strengthening  Assessment: Tolerated treatment fair  Patient demonstrated fatigue post treatment  Patient would benefit from continued course of skilled physical therapy to address impairments in an effort to improve function  Plan: Continue per plan of care          Precautions: DM, HTN        Daily Treatment Diary         Manuals                    PROM R shoulder    RG                                                                                           Exercise Diary                        UBE    3/3                   Ball circles at wall   10 cw/ccw x 3                   AROM Scaption    2 x 10                   SL ER    0#, 2 x 10                   SL flexion/extension    NV                   SA punchups    NV                   Prone rhomboids    NP                   Prone Lower traps    NP                                                                   Tband Rows    green, 2 x 10                    Tband shoulder extension    green, 2 x 10                    Tband ER    green, 2 x 10                    Tband Robbery    yellow, 2 x 10                                                                                                                                                                                                                   Modalities                       Cryo prn    10 min

## 2018-09-28 ENCOUNTER — OFFICE VISIT (OUTPATIENT)
Dept: PHYSICAL THERAPY | Facility: CLINIC | Age: 63
End: 2018-09-28
Payer: MEDICARE

## 2018-09-28 DIAGNOSIS — M25.511 PAIN IN JOINT OF RIGHT SHOULDER REGION: Primary | ICD-10-CM

## 2018-09-28 DIAGNOSIS — Z00.00 PREVENTATIVE HEALTH CARE: Primary | ICD-10-CM

## 2018-09-28 PROCEDURE — 97110 THERAPEUTIC EXERCISES: CPT | Performed by: PHYSICAL THERAPIST

## 2018-09-28 RX ORDER — ACYCLOVIR 400 MG/1
400 TABLET ORAL 2 TIMES DAILY
Qty: 60 TABLET | Refills: 5 | Status: SHIPPED | OUTPATIENT
Start: 2018-09-28 | End: 2019-09-17 | Stop reason: SDUPTHER

## 2018-09-28 NOTE — PROGRESS NOTES
Daily Note     Today's date: 2018  Patient name: Anamaria Santos  : [id number]  MRN: [id number]  Referring provider: Kary Cornelius MD  Dx:   Encounter Diagnosis     ICD-10-CM    1  Pain in joint of right shoulder region M25 511                   Subjective: Not as generally sore today  Denies shoulder pain upon entry into clinic  Objective: See treatment diary below  Progressed UE strengthening  Assessment: Better therex tolerance today  Patient declines use of ice as he tells me his shoulder "feels good," after interventions  Patient would benefit from continued course of skilled physical therapy to address impairments in an effort to improve function  Plan: Continue per plan of care          Precautions: DM, HTN        Daily Treatment Diary         Manuals                  PROM R shoulder    RG  RG                                                                                         Exercise Diary                        UBE    3/3  3/3, L 1 5                 Ball circles at wall   10 cw/ccw x 3  10 cw/ccw x 3                 AROM Scaption    2 x 10  2 x 10                 SL ER    0#, 2 x 10  1#, 2 x 10  3#, 1 x 10                 SL flexion/extension    NV  1#, 2 x 10                 SA punchups    NV  2 x 20                 Prone rhomboids    NP NV                 Prone Lower traps    NP NV                                                                 Tband Rows    green, 2 x 10  Green,3sec hold, x 20                  Tband shoulder extension    green, 2 x 10  Green, 3sec hold, x 20                  Tband ER    green, 2 x 10  green, 3sec hold, x 20                  Tband Robbery    yellow, 2 x 10  Yellow, 3sec, hold, x 20                                                                                                                                                                                                                 Modalities                       Cryo prn    10 min  def

## 2018-10-01 ENCOUNTER — HOSPITAL ENCOUNTER (OUTPATIENT)
Dept: INFUSION CENTER | Facility: CLINIC | Age: 63
Discharge: HOME | End: 2018-10-01
Payer: MEDICARE

## 2018-10-01 DIAGNOSIS — C91.10 CLL (CHRONIC LYMPHOCYTIC LEUKEMIA) (HCC): ICD-10-CM

## 2018-10-01 LAB
ALBUMIN SERPL BCP-MCNC: 3.6 G/DL (ref 3.5–5.7)
ALP SERPL-CCNC: 39 U/L (ref 46–116)
ALT SERPL W P-5'-P-CCNC: 26 U/L (ref 12–78)
ANION GAP SERPL CALCULATED.3IONS-SCNC: 10 MMOL/L (ref 4–13)
ANISOCYTOSIS BLD QL SMEAR: PRESENT
AST SERPL W P-5'-P-CCNC: 26 U/L (ref 5–45)
BASOPHILS # BLD AUTO: 0 THOUSAND/UL (ref 0–0.1)
BASOPHILS NFR MAR MANUAL: 0 % (ref 0–1)
BILIRUB SERPL-MCNC: 0.7 MG/DL (ref 0.2–1)
BUN SERPL-MCNC: 18 MG/DL (ref 5–25)
CALCIUM SERPL-MCNC: 9.4 MG/DL (ref 8.3–10.1)
CHLORIDE SERPL-SCNC: 105 MMOL/L (ref 98–108)
CO2 SERPL-SCNC: 28 MMOL/L (ref 21–32)
CREAT SERPL-MCNC: 0.9 MG/DL (ref 0.6–1.3)
EOSINOPHIL # BLD AUTO: 0 THOUSAND/UL (ref 0–0.61)
EOSINOPHIL NFR BLD MANUAL: 0 % (ref 0–6)
ERYTHROCYTE [DISTWIDTH] IN BLOOD BY AUTOMATED COUNT: 15.9 % (ref 11.6–15.1)
GFR SERPL CREATININE-BSD FRML MDRD: 90 ML/MIN/1.73SQ M
GLUCOSE SERPL-MCNC: 122 MG/DL (ref 65–140)
HCT VFR BLD AUTO: 40.6 % (ref 36.5–49.3)
HGB BLD-MCNC: 12.7 G/DL (ref 12–17)
LG PLATELETS BLD QL SMEAR: PRESENT
LYMPHOCYTES # BLD AUTO: 0.6 THOUSAND/UL (ref 0.6–4.47)
LYMPHOCYTES # BLD AUTO: 13 % (ref 14–44)
MCH RBC QN AUTO: 28.4 PG (ref 26.8–34.3)
MCHC RBC AUTO-ENTMCNC: 31.3 G/DL (ref 31.4–37.4)
MCV RBC AUTO: 91 FL (ref 82–98)
MONOCYTES # BLD AUTO: 0.18 THOUSAND/UL (ref 0–1.22)
MONOCYTES NFR BLD AUTO: 4 % (ref 4–12)
NEUTS BAND NFR BLD MANUAL: 0 % (ref 0–8)
NEUTS SEG # BLD: 3.82 THOUSAND/UL (ref 1.81–6.82)
NEUTS SEG NFR BLD AUTO: 83 % (ref 43–75)
OVALOCYTES BLD QL SMEAR: PRESENT
PLATELET # BLD AUTO: 55 THOUSANDS/UL (ref 149–390)
PLATELET BLD QL SMEAR: ABNORMAL
PMV BLD AUTO: 10.7 FL (ref 8.9–12.7)
POTASSIUM SERPL-SCNC: 3.9 MMOL/L (ref 3.5–5.3)
PROT SERPL-MCNC: 5.7 G/DL (ref 6.4–8.2)
RBC # BLD AUTO: 4.47 MILLION/UL (ref 3.88–5.62)
SODIUM SERPL-SCNC: 143 MMOL/L (ref 136–145)
TOTAL CELLS COUNTED SPEC: 100
WBC # BLD AUTO: 4.6 THOUSAND/UL (ref 4.31–10.16)
WBC NRBC COR # BLD: 4.6 THOUSAND/UL (ref 4.31–10.16)

## 2018-10-01 PROCEDURE — 80053 COMPREHEN METABOLIC PANEL: CPT

## 2018-10-01 PROCEDURE — 85027 COMPLETE CBC AUTOMATED: CPT

## 2018-10-01 PROCEDURE — 85007 BL SMEAR W/DIFF WBC COUNT: CPT

## 2018-10-01 NOTE — PROGRESS NOTES
Patient arrived on unit for labs  Tolerate central labs and port flush   AVS given to patient aware of next appointment

## 2018-10-02 ENCOUNTER — OFFICE VISIT (OUTPATIENT)
Dept: PHYSICAL THERAPY | Facility: CLINIC | Age: 63
End: 2018-10-02
Payer: MEDICARE

## 2018-10-02 DIAGNOSIS — M25.511 PAIN IN JOINT OF RIGHT SHOULDER REGION: Primary | ICD-10-CM

## 2018-10-02 PROCEDURE — 97110 THERAPEUTIC EXERCISES: CPT | Performed by: PHYSICAL THERAPIST

## 2018-10-02 NOTE — PROGRESS NOTES
Daily Note     Today's date: 10/2/2018  Patient name: Norbert Sterling  :   MRN: [id number]  Referring provider: Marshall Koo MD  Dx:   Encounter Diagnosis     ICD-10-CM    1  Pain in joint of right shoulder region M25 511                   Subjective:Slept on L side last night, L shoulder is sore  R shoulder is feeling fine  Objective: See treatment diary below  Not all therex performed because some involved laying on L side  Assessment: R shoulder responding well to current plan of care  No episodes of lasting R shoulder pain since starting therapy  Plan: Continue per plan of care          Precautions: DM, HTN        Daily Treatment Diary         Manuals 9/21  9/24  9/28  10/2               PROM R shoulder    RG  RG  RG                                                                                       Exercise Diary                        UBE    3/3  3/3, L 1 5  3/3, L1               Ball circles at wall   10 cw/ccw x 3  10 cw/ccw x 3  10 cw/ccw x 3               AROM Scaption    2 x 10  2 x 10  2 x 10               SL ER    0#, 2 x 10  1#, 2 x 10  3#, 1 x 10  supine on R 2 x 10               SL flexion/extension    NV  1#, 2 x 10  NP               SA punchups    NV  2 x 20  R, 2 x 20               Prone rhomboids    NP NV  NP               Prone Lower traps    NP NV  NP                                                               Tband Rows    green, 2 x 10  Green,3sec hold, x 20  Green, 3sec x 20                Tband shoulder extension    green, 2 x 10  Green, 3sec hold, x 20  Pink, 3sec, x 20                Tband ER    green, 2 x 10  green, 3sec hold, x 20  green, 3sec, 2 x10                Tband Robbery    yellow, 2 x 10  Yellow, 3sec, hold, x 20  NP                Tband IR        green, 3sec, 2 x 10                                                                                                                                                                                     Modalities                       Cryo prn    10 min  def  def

## 2018-10-04 ENCOUNTER — TELEPHONE (OUTPATIENT)
Dept: GASTROENTEROLOGY | Facility: MEDICAL CENTER | Age: 63
End: 2018-10-04

## 2018-10-04 NOTE — TELEPHONE ENCOUNTER
I spoke to Eris Perales  He reports progressive dysphagia again for solid foods especially meats and bread  He denies any dysphagia for liquids  He had EGD in June 2018 which showed esophageal narrowing which required dilation  We will schedule him for repeat EGD as soon as possible  Our procedure  will reach out to him

## 2018-10-04 NOTE — TELEPHONE ENCOUNTER
Dr Ev Figueroa pt called stating he is supposed to get and EGD every six months and wanted to know when should he sched his next procedure  Pt states he continues to get food stuck in his throat  Pt would like to be called to please advise and can be reached at [phone]   Pt ask if he does not answer to please leave a voice message

## 2018-10-05 ENCOUNTER — OFFICE VISIT (OUTPATIENT)
Dept: PHYSICAL THERAPY | Facility: CLINIC | Age: 63
End: 2018-10-05
Payer: MEDICARE

## 2018-10-05 DIAGNOSIS — M25.511 PAIN IN JOINT OF RIGHT SHOULDER REGION: Primary | ICD-10-CM

## 2018-10-05 PROCEDURE — 97140 MANUAL THERAPY 1/> REGIONS: CPT | Performed by: PHYSICAL THERAPIST

## 2018-10-05 PROCEDURE — 97110 THERAPEUTIC EXERCISES: CPT | Performed by: PHYSICAL THERAPIST

## 2018-10-05 NOTE — PROGRESS NOTES
Daily Note     Today's date: 10/5/2018  Patient name: Darius Aleman  : 3808  MRN: [id number]  Referring provider: Rafaela Reinoso MD  Dx:   Encounter Diagnosis     ICD-10-CM    1  Pain in joint of right shoulder region M25 511                   Subjective: Patient stated moderate stiffness in his shoulder prior to treatment session  Objective: See treatment diary below  Assessment: Patient able to resume exercises as listed without significant c/o pain; patient demonstrated moderate difficulty with sidlelying ER; minimal pain with manual shoulder PROM  Plan: Continue per plan of care  Precautions: DM, HTN        Daily Treatment Diary         Manuals 9/21  9/24  9/28  10/2  10/5             PROM R shoulder    RG  RG  RG  KK                                                                                     Exercise Diary                        UBE    3/3  3/3, L 1 5  3/3, L1  3/3, L1             Ball circles at wall   10 cw/ccw x 3  10 cw/ccw x 3  10 cw/ccw x 3  10 cw/ccw x3             AROM Scaption    2 x 10  2 x 10  2 x 10  2x10             SL ER    0#, 2 x 10  1#, 2 x 10  3#, 1 x 10  supine on R 2 x 10  1# 2x10             SL flexion/extension    NV  1#, 2 x 10  NP  NP             SA punchups    NV  2 x 20  R, 2 x 20  2x20             Prone rhomboids    NP NV  NP  NP             Prone Lower traps    NP NV  NP  NP                                                             Tband Rows    green, 2 x 10  Green,3sec hold, x 20  Green, 3sec x 20  grn  , 3" x 20              Tband shoulder extension    green, 2 x 10  Green, 3sec hold, x 20  Pink, 3sec, x 20  pink, 3" x 20              Tband ER    green, 2 x 10  green, 3sec hold, x 20  green, 3sec, 2 x10  grn  , 3" 2x10              Tband Robbery    yellow, 2 x 10  Yellow, 3sec, hold, x 20  NP  yel  , 3" 2x10              Tband IR        green, 3sec, 2 x 10  grn  , 3" 2x10                                                                                                                                                                                     Modalities                       Cryo prn    10 min  def  def

## 2018-10-08 ENCOUNTER — OFFICE VISIT (OUTPATIENT)
Dept: PHYSICAL THERAPY | Facility: CLINIC | Age: 63
End: 2018-10-08
Payer: MEDICARE

## 2018-10-08 DIAGNOSIS — M25.511 PAIN IN JOINT OF RIGHT SHOULDER REGION: Primary | ICD-10-CM

## 2018-10-08 PROCEDURE — G8990 OTHER PT/OT CURRENT STATUS: HCPCS | Performed by: PHYSICAL THERAPIST

## 2018-10-08 PROCEDURE — G8991 OTHER PT/OT GOAL STATUS: HCPCS | Performed by: PHYSICAL THERAPIST

## 2018-10-08 PROCEDURE — 97140 MANUAL THERAPY 1/> REGIONS: CPT | Performed by: PHYSICAL THERAPIST

## 2018-10-08 PROCEDURE — 97110 THERAPEUTIC EXERCISES: CPT | Performed by: PHYSICAL THERAPIST

## 2018-10-08 RX ORDER — ACETAMINOPHEN 325 MG/1
650 TABLET ORAL ONCE
Status: COMPLETED | OUTPATIENT
Start: 2018-10-09 | End: 2018-10-09

## 2018-10-08 RX ORDER — SODIUM CHLORIDE 9 MG/ML
20 INJECTION, SOLUTION INTRAVENOUS CONTINUOUS
Status: DISCONTINUED | OUTPATIENT
Start: 2018-10-09 | End: 2018-10-12 | Stop reason: HOSPADM

## 2018-10-08 NOTE — PROGRESS NOTES
Daily Note     Today's date: 10/8/2018  Patient name: Flip Gifford  :   MRN: [id number]  Referring provider: Rafia Monzon MD  Dx:   Encounter Diagnosis     ICD-10-CM    1  Pain in joint of right shoulder region M25 511                   Subjective: Milla Cabrera tells me his shoulder are feeling better  He feels therapy "is doing what it's supposed to do "       Objective: See treatment diary below  Progressed UE strengthening program  FOTO score: Improved to 60 from 46 at time of IE  Assessment: Since starting therapy pt has made the following progress towards goals:  1) Decreased pain  2) Improved range of motion  3) Improved strength  4) Improved self rated functional score  Making good progress  Plan: Continue per plan of care  Precautions: DM, HTN        Daily Treatment Diary         Manuals 9/21  9/24  9/28  10/2  10/5  10/8           PROM R shoulder    RG  RG  RG  KK  RG                                                                                   Exercise Diary                        UBE    3/3  3/3, L 1 5  3/3, L1  3/3, L1  3/3,  L2 0             Ball circles at wall   10 cw/ccw x 3  10 cw/ccw x 3  10 cw/ccw x 3  10 cw/ccw x3  10 cw/ccw x 3           AROM Scaption    2 x 10  2 x 10  2 x 10  2x10  2 x12           SL ER    0#, 2 x 10  1#, 2 x 10  3#, 1 x 10  supine on R 2 x 10  1# 2x10 1#,   2 x 15           SL flexion/extension    NV  1#, 2 x 10  NP  NP  1#, 2 x 10           SA punchups    NV  2 x 20  R, 2 x 20  2x20  1#, 2 x 20           Prone rhomboids    NP NV  NP  NP  NP           Prone Lower traps    NP NV  NP  NP  NP                                                           Tband Rows    green, 2 x 10  Green,3sec hold, x 20  Green, 3sec x 20  grn  , 3" x 20  Blue, 3sec, 2 x 10            Tband shoulder extension    green, 2 x 10  Green, 3sec hold, x 20  Pink, 3sec, x 20  pink, 3" x 20  Green, 3sec, x 20            Tband ER    green, 2 x 10  green, 3sec hold, x 20  green, 3sec, 2 x10  grn  , 3" 2x10  Green, 3sec, 2 x 10            Tband Robbery    yellow, 2 x 10  Yellow, 3sec, hold, x 20  NP  yel  , 3" 2x10  yellow, 3sec, 2 x 10            Tband IR        green, 3sec, 2 x 10  grn  , 3" 2x10  Green, 3sec, 2 x 10                                                                                                                                                                                   Modalities                       Cryo prn    10 min  def  def

## 2018-10-09 ENCOUNTER — HOSPITAL ENCOUNTER (OUTPATIENT)
Dept: INFUSION CENTER | Facility: CLINIC | Age: 63
Discharge: HOME | End: 2018-10-09
Payer: MEDICARE

## 2018-10-09 VITALS
RESPIRATION RATE: 16 BRPM | SYSTOLIC BLOOD PRESSURE: 138 MMHG | HEART RATE: 76 BPM | TEMPERATURE: 98.4 F | DIASTOLIC BLOOD PRESSURE: 87 MMHG

## 2018-10-09 PROCEDURE — 96413 CHEMO IV INFUSION 1 HR: CPT

## 2018-10-09 PROCEDURE — 96375 TX/PRO/DX INJ NEW DRUG ADDON: CPT

## 2018-10-09 PROCEDURE — 96415 CHEMO IV INFUSION ADDL HR: CPT

## 2018-10-09 RX ADMIN — ACETAMINOPHEN 650 MG: 325 TABLET, FILM COATED ORAL at 08:29

## 2018-10-09 RX ADMIN — DIPHENHYDRAMINE HYDROCHLORIDE 25 MG: 50 INJECTION, SOLUTION INTRAMUSCULAR; INTRAVENOUS at 08:40

## 2018-10-09 RX ADMIN — SODIUM CHLORIDE 20 ML/HR: 0.9 INJECTION, SOLUTION INTRAVENOUS at 08:26

## 2018-10-09 RX ADMIN — DEXAMETHASONE SODIUM PHOSPHATE 20 MG: 10 INJECTION, SOLUTION INTRAMUSCULAR; INTRAVENOUS at 08:26

## 2018-10-09 RX ADMIN — OBINUTUZUMAB 1000 MG: 1000 INJECTION, SOLUTION, CONCENTRATE INTRAVENOUS at 09:03

## 2018-10-09 NOTE — PLAN OF CARE
Problem: Potential for Falls  Goal: Patient will remain free of falls  INTERVENTIONS:  - Assess patient frequently for physical needs  -  Identify cognitive and physical deficits and behaviors that affect risk of falls    -  [place] fall precautions as indicated by assessment   - Educate patient/family on patient safety including physical limitations  - Instruct patient to call for assistance with activity based on assessment  - Modify environment to reduce risk of injury  - Consider OT/PT consult to assist with strengthening/mobility   Outcome: Progressing

## 2018-10-10 ENCOUNTER — OFFICE VISIT (OUTPATIENT)
Dept: HEMATOLOGY ONCOLOGY | Facility: CLINIC | Age: 63
End: 2018-10-10
Payer: MEDICARE

## 2018-10-10 VITALS
SYSTOLIC BLOOD PRESSURE: 150 MMHG | HEART RATE: 76 BPM | BODY MASS INDEX: 30.22 KG/M2 | DIASTOLIC BLOOD PRESSURE: 70 MMHG | RESPIRATION RATE: 18 BRPM | WEIGHT: 228 LBS | OXYGEN SATURATION: 98 % | HEIGHT: 73 IN | TEMPERATURE: 98.6 F

## 2018-10-10 DIAGNOSIS — C91.10 CLL (CHRONIC LYMPHOCYTIC LEUKEMIA) (HCC): Primary | ICD-10-CM

## 2018-10-10 DIAGNOSIS — M25.50 ARTHRALGIA OF MULTIPLE JOINTS: ICD-10-CM

## 2018-10-10 DIAGNOSIS — D69.6 THROMBOCYTOPENIA (HCC): ICD-10-CM

## 2018-10-10 DIAGNOSIS — D59.19 OTHER AUTOIMMUNE HEMOLYTIC ANEMIAS: ICD-10-CM

## 2018-10-10 DIAGNOSIS — D75.82 HIT (HEPARIN-INDUCED THROMBOCYTOPENIA) (HCC): ICD-10-CM

## 2018-10-10 PROCEDURE — 99214 OFFICE O/P EST MOD 30 MIN: CPT | Performed by: PHYSICIAN ASSISTANT

## 2018-10-10 NOTE — PROGRESS NOTES
Hematology/Oncology Outpatient Follow-up  Casi Olivera 58 y o  male 1955 [id number]    Date:  10/10/2018      Assessment and Plan:  1  CLL (chronic lymphocytic leukemia) ([facility] )  Patient is on treatment with Ibrutinib 140 mg PO daily and Gazayva 1000 mg every 4 weeks  He continues to tolerate well  Hemoglobin remains stable  Platelets are also stable       He will have CBC every other week at this time  2  Thrombocytopenia ([place] [address] )  Secondary to chemotherapy  This remains stable  3  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[facility])  Patient is aware of allergy  No heparin  4  Other autoimmune hemolytic anemias ([facility] )  He remains on prednisone 5 mg PO daily  5  Arthralgia of multiple joints  Patient has needed to take oxycodone 9 times in the past 1 month for his arthralgias  This is likely related to patient's ibrutinib  Incidence of arthralgia in patient's with ibrutinib is 23%; 27% musculoskeletal pain  Sunday night this week he had horrible diffuse arthraglias that he couldn't sleep and it was difficult to walk  After his infusion yesterday, all the pain resolved  Discussed that this likely is due to Decadron he received at the infusion center  If this were to happen again, he is to call and we can call in Decadron PO      HPI:  On 3/20/17 patient found to have hemoglobin of 6 2, ,000  He was admitted to [address] [place] [address] [address] for blood transfusion and plan to transfer to [address] [place] [address]  On 3/20/17 WBC count 195,000, hemoglobin 4 9, platelet count 65  Direct coomb's was positive with undetectable haptoglobin  He was given 2 units of PRBCs, Solu-Medrol 1 g ×3 days and IVIG 1 g/kg daily ×3 days  His hemoglobin continued to drop   Bone marrow biopsy on 3/21 showed marrow cellularity of greater than 95% almost replaced by small lymphocytes, lambda light chain restricted, CD20 positive, CD19 positive, CD23 positive partially expressing CD11c and CD25 and CD5 positive and negative for CD 79 and CD38  He was treated with single dose of chlorambucil to control his CLL   3/22 second dose of chlorambucil, also Rituxan 375 mg/M ² autoimmune hemolytic anemia  WBC continued to rise, 266,000  Patient initiated with Venetoclax 20 mg daily  Tumor lysis monitored every 8 hours; given aggressive hydration and Lasix and remained euvolemic  3/24-WBC dropped to 112,000  3/25- WBC 63,000  3/26-WBC 15,000, , platelet count 61,147  Patient became febrile, 101 3  The cefepime initiated Venetoclax held  3/28-patient fell from bed, CT head negative  ANC 1200, cefepime discontinued and Levaquin 75 mg daily started sliding 3/29 given second dose of rituximab 375 mg/m ²   3/30-WBC meg to 14 5 thousand, platelets 38,268, Venetoclax restarted 10 mg every other day  3/31 WBC 4 4, , platelet count 26,969  4/1-4/4: WBC maintained less than 10,000, ANC and platelet count meg  He was discharged on Venetoclax 10 mg every other day  He was instructed to discontinue simvastatin, Ranexa, aspirin, metoprolol 50 mg q  day, losartan 50 mg q  day, Plavix 75 mg q  day, folic acid 715 µg b i d , prednisone 60 mg, allopurinol  He was advised to continue Levaquin 750 mg daily for 10 days, Lexapro 10 mg daily, fenofibrate 50 mg daily, gabapentin 100 mg twice daily, Protonix 40 mg daily     Imaging studies performed at [facility]:  Patient had echocardiogram while inpatient at [facility] on 3/21  This study was unremarkable  CT chest abdomen pelvis without contrast on 3/24 showed stable pulmonary nodules, patchy groundglass densities of lower lobes previously identified and which may represent volume loss or early infiltrate  Persistent diffuse bronchial wall thickening suggesting acute/chronic bronchitis changes  No bronchiectasis  No masses  Stable lymphadenopathy of mediastinum  Stable axillary lymphadenopathy  Splenomegaly 23 9 cm   Extensive lymphadenopathy throughout the entire retroperitoneal, small bowel mesentery, and pelvis  Largest lymph node in right lower quadrant measured 4 6 x 4 8  Stable enlarged left para-aortic lymph node measuring 6 2 x 6 8   4/12/17: Patient received one dose of Rituxan on 4/7/17  Venetoclax 10 mg every other day 4/5/17 - 4/9/17  Venetoclax 10 mg every day beginning 4/10/17  Monitoring CBC 3 times per week  4/28/17: patient had follow-up appointment at [facility] with Dr Gianna Chen  She recommended to slowly increase dose of veneteclax  Also, she recommended as he has had numerous treatments with Rituxan, if antibody directed therapy becomes indicated in the future recommendation was with [facility]  She will be seeing her on a p r n  Basis      July 2017- Hemolysis  Disease not under control with veneteclax  Started prednisone 60 mg daily, folic acid, IBRUTINIB 581 mg daily and Gazyva       Sept 2017- 9/18-9/20 patient was admitted due to uncontrolled hyperglycemia with new onset DM type II due to chronic prednisone use for CLL/hemolytic anemia; also found to have profound neutropenia  Ibrutinib dose was reduced to 280 mg daily      October 2017- 10/7/17 - 10/14/17 patient was admitted due to cellulitis on his scalp     Dec 2017- Ferol Charm decreased to 140 mg PO daily due to thrombocytopenia      4/23 - 4/27 patient was admitted due to listeria bacteremia  He was discharged on IV ampicillin  Echo negative for vegetations  [facility] and [facility] was on hold at that time  Repeat CBC on 5/14/18 showed normal ANC, and hemoglobin  Platelets adequate at 83K    Interval history:    ROS: Review of Systems   Constitutional: Negative for appetite change, chills, fatigue, fever and unexpected weight change  HENT: Negative for mouth sores and nosebleeds  Respiratory: Negative for cough and shortness of breath  Cardiovascular: Negative for chest pain, palpitations and leg swelling     Gastrointestinal: Negative for abdominal pain, blood in stool, constipation, diarrhea, nausea and vomiting  Genitourinary: Negative for difficulty urinating, dysuria and hematuria  Musculoskeletal: Negative for arthralgias, joint swelling and myalgias  Skin: Negative  Neurological: Negative for dizziness, weakness, light-headedness, numbness and headaches  Hematological: Negative  Psychiatric/Behavioral: Negative  Past Medical History:   Diagnosis Date    CAD (coronary artery disease) 2004    Cellulitis of scalp     CKD (chronic kidney disease) stage 3, GFR 30-59 ml/min     CLL [facility]chronic lymphocytic [facility]) [facility] )     COPD (chronic obstructive pulmonary disease) ([facility] )     Diabetes mellitus ([facility] )     Dyslipidemia     H/O blood clots     Hemolytic anemia (HCC)     History of TIA (transient ischemic attack)     Hyperlipidemia     Hypertension     Multiple gastric ulcers     Myocardial infarction (HCC)     acute    Neutropenia (HCC)     Recurrent sinusitis     Sleep apnea     Thrombocytopenia (HCC)     Vertigo        Past Surgical History:   Procedure Laterality Date    ARTHROSCOPIC REPAIR ACL      lt knee    CARDIAC SURGERY      cardiac cath with stent    FOOT SURGERY      HAND SURGERY      KNEE ARTHROSCOPY      OTHER SURGICAL HISTORY      cardiac cath lesion 1, 1st adjunct treat device: stent    PORTACATH PLACEMENT      RI ESOPHAGOGASTRODUODENOSCOPY TRANSORAL DIAGNOSTIC N/A 10/5/2017    Procedure: ESOPHAGOGASTRODUODENOSCOPY (EGD) with bx;  Surgeon: Janay Benedict DO;  Location: [facility]; Service: Gastroenterology    RI ESOPHAGOGASTRODUODENOSCOPY TRANSORAL DIAGNOSTIC N/A 3/8/2018    Procedure: ESOPHAGOGASTRODUODENOSCOPY (EGD) with biopsy;  Surgeon: Janay Benedict DO;  [facility]: [facility]; Service: Gastroenterology    RI ESOPHAGOGASTRODUODENOSCOPY TRANSORAL DIAGNOSTIC N/A 6/20/2018    Procedure: ESOPHAGOGASTRODUODENOSCOPY (EGD) with Dilation;  Surgeon: Janay Benedcit DO;  [facility]: [facility];   Service: Gastroenterology    RI ESOPHAGOSCOPY FLEXIBLE TRANSORAL WITH BIOPSY N/A 9/2/2016    Procedure: ESOPHAGOGASTRODUODENOSCOPY (EGD); ESOPHAGEAL DILATION; ESOPHAGEAL BIOPSY;  Surgeon: Melina Riley MD;  [facility]: [facility];  Service: Thoracic    TONSILLECTOMY      UPPER GASTROINTESTINAL ENDOSCOPY      x 6       Social History     Social History    Marital status: /Civil Union     Spouse name: N/A    Number of children: N/A    Years of education: N/A     Social History Main Topics    Smoking status: Former Smoker    Smokeless tobacco: Never Used      Comment: pt refuses to answer question; current every day smoker as per  [employer]    Alcohol use No      Comment: social use as per [employer]    Drug use: No    Sexual activity: Not on file     Other Topics Concern    Not on file     Social History Narrative    No narrative on file       Family History   Problem Relation Age of Onset    Leukemia Mother         chronic    Colon polyps Mother         sidmoid    No Known Problems Father        Allergies   Allergen Reactions    Heparin Other (See Comments)     Other reaction(s): Blood Disorders  clot    Macrolides And Ketolides Other (See Comments)     Interacts with other meds he is taking         Current Outpatient Prescriptions:     acyclovir (ZOVIRAX) 400 MG tablet, Take 1 tablet (400 mg total) by mouth 2 (two) times a day for 180 days, Disp: 60 tablet, Rfl: 5    aspirin 81 MG tablet, Take 81 mg by mouth every other day Every other day , Disp: , Rfl:     citalopram (CeleXA) 40 mg tablet, Take 40 mg by mouth daily, Disp: , Rfl:     fenofibrate (TRIGLIDE) 50 MG tablet, Take 134 mg by mouth daily  , Disp: , Rfl:     folic acid (FOLVITE) 1 mg tablet, Take 1 mg by mouth daily, Disp: , Rfl:     furosemide (LASIX) 40 mg tablet, furosemide 40 mg tablet, Disp: , Rfl:     gabapentin (NEURONTIN) 600 MG tablet, Take 600 mg by mouth 2 (two) times a day  , Disp: , Rfl:     glucose monitoring kit (FREESTYLE) monitoring kit, 1 each by Does not apply route as needed ( ) E 11 9, Disp: 1 each, Rfl: 0    insulin lispro (HumaLOG) 100 units/mL injection, Inject 10 Units under the skin 3 (three) times a day with meals, Disp: , Rfl: 0    ketorolac (TORADOL) 10 mg tablet, Take 1 tablet (10 mg total) by mouth 2 (two) times a day as needed for moderate pain, Disp: 60 tablet, Rfl: 0    Lancets (FREESTYLE) lancets, Use as instructed--test 2 times a day  E 11 9, Disp: 100 each, Rfl: 0    multivitamin (THERAGRAN) TABS, Take 1 tablet by mouth daily, Disp: , Rfl:     obinutuzumab (GAZYVA) 1000 mg/40 mL SOLN, Infuse into a venous catheter, Disp: , Rfl:     oxyCODONE (ROXICODONE) 10 MG TABS, Take 1 tablet (10 mg total) by mouth every 6 (six) hours as needed for moderate pain For ongoing pain control Max Daily Amount: 40 mg, Disp: 90 tablet, Rfl: 0    pantoprazole (PROTONIX) 40 mg tablet, Take 1 tablet (40 mg total) by mouth daily, Disp: 30 tablet, Rfl: 0    predniSONE 10 mg tablet, Take 1 tablet (10 mg total) by mouth daily (Patient taking differently: Take 5 mg by mouth daily  ), Disp: 30 tablet, Rfl: 2    sodium chloride, PF, 0 9 %, 10 mL by Intracatheter route see administration instructions 10mL into port before and after ampicillin doses, Disp: , Rfl: 0    VENTOLIN  (90 Base) MCG/ACT inhaler, INHALE 2 PUFFS 4 TIMES A DAY AS NEEDED, Disp: , Rfl: 3    Ibrutinib 140 MG TABS, Take 140 mg by mouth daily for 28 days, Disp: 30 tablet, Rfl: 0  No current facility-administered medications for this visit       Facility-Administered Medications Ordered in Other Visits:     sodium chloride 0 9 % infusion, 20 mL/hr, Intravenous, Continuous, Lyndsay Diaz PA-C, Stopped at 10/09/18 1230      Physical Exam:  /70 (BP Location: Left arm, Cuff Size: Large)   Pulse 76   Temp 98 6 °F (37 °C) (Tympanic)   Resp 18   Ht 6' 1" (1 854 m)   Wt 103 kg (228 lb)   SpO2 98%   BMI 30 08 kg/m²     Physical Exam   Constitutional: He is oriented to person, place, and time  He appears well-developed and well-nourished  No distress  HENT:   Head: Normocephalic and atraumatic  Eyes: Conjunctivae are normal  No scleral icterus  Neck: Normal range of motion  Neck supple  Cardiovascular: Normal rate, regular rhythm and normal heart sounds  No murmur heard  Pulmonary/Chest: Effort normal and breath sounds normal  No respiratory distress  Abdominal: Soft  There is no tenderness  Musculoskeletal: Normal range of motion  He exhibits no edema or tenderness  Neurological: He is alert and oriented to person, place, and time  No cranial nerve deficit  Skin: Skin is warm and dry  Psychiatric: He has a normal mood and affect  Vitals reviewed  Labs:  Lab Results   Component Value Date    WBC 4 60 10/01/2018    HGB 12 7 10/01/2018    HCT 40 6 10/01/2018    MCV 91 10/01/2018    PLT 55 (L) 10/01/2018     Lab Results   Component Value Date     10/01/2018    K 3 9 10/01/2018     10/01/2018    CO2 28 10/01/2018    ANIONGAP 8 12/29/2015    BUN 18 10/01/2018    CREATININE 0 90 10/01/2018    GLUCOSE 109 12/29/2015    GLUF 118 (H) 04/24/2018    CALCIUM 9 4 10/01/2018    AST 26 10/01/2018    ALT 26 10/01/2018    ALKPHOS 39 (L) 10/01/2018    PROT 5 9 (L) 12/29/2015    BILITOT 0 7 12/29/2015    EGFR 90 10/01/2018         Patient voiced understanding and agreement in the above discussion  Aware to contact our office with questions/symptoms in the interim

## 2018-10-11 ENCOUNTER — DOCUMENTATION (OUTPATIENT)
Dept: INFUSION CENTER | Facility: CLINIC | Age: 63
End: 2018-10-11

## 2018-10-11 ENCOUNTER — DOCUMENTATION (OUTPATIENT)
Dept: HEMATOLOGY ONCOLOGY | Facility: CLINIC | Age: 63
End: 2018-10-11

## 2018-10-11 PROBLEM — R13.19 ESOPHAGEAL DYSPHAGIA: Status: ACTIVE | Noted: 2018-10-11

## 2018-10-11 NOTE — TELEPHONE ENCOUNTER
EGD scheduled with Dr Mejía at [facility] on 10/18/2018  I gave pt verbal instructions/emailed to [email or web address]

## 2018-10-11 NOTE — PROGRESS NOTES
Beatriz Gutierrez   [id number] renewal application has been approved [employer] From 10-11-18 thru 12-31-18 for his [employer]

## 2018-10-15 ENCOUNTER — HOSPITAL ENCOUNTER (OUTPATIENT)
Dept: INFUSION CENTER | Facility: CLINIC | Age: 63
Discharge: HOME | End: 2018-10-15
Payer: MEDICARE

## 2018-10-15 DIAGNOSIS — C91.10 CHRONIC LYMPHOCYTIC LEUKEMIA (HCC): ICD-10-CM

## 2018-10-15 DIAGNOSIS — C91.10 CLL (CHRONIC LYMPHOCYTIC LEUKEMIA) (HCC): ICD-10-CM

## 2018-10-15 LAB
ALBUMIN SERPL BCP-MCNC: 3.9 G/DL (ref 3.5–5.7)
ALP SERPL-CCNC: 44 U/L (ref 46–116)
ALT SERPL W P-5'-P-CCNC: 39 U/L (ref 12–78)
ANION GAP SERPL CALCULATED.3IONS-SCNC: 14 MMOL/L (ref 4–13)
AST SERPL W P-5'-P-CCNC: 30 U/L (ref 5–45)
BASOPHILS # BLD AUTO: 0 THOUSAND/UL (ref 0–0.1)
BASOPHILS NFR MAR MANUAL: 0 % (ref 0–1)
BILIRUB DIRECT SERPL-MCNC: 0.18 MG/DL (ref 0–0.2)
BILIRUB SERPL-MCNC: 0.8 MG/DL (ref 0.2–1)
BUN SERPL-MCNC: 16 MG/DL (ref 5–25)
CALCIUM SERPL-MCNC: 9.5 MG/DL (ref 8.3–10.1)
CHLORIDE SERPL-SCNC: 104 MMOL/L (ref 98–108)
CO2 SERPL-SCNC: 28 MMOL/L (ref 21–32)
CREAT SERPL-MCNC: 1.2 MG/DL (ref 0.6–1.3)
EOSINOPHIL # BLD AUTO: 0.1 THOUSAND/UL (ref 0–0.61)
EOSINOPHIL NFR BLD MANUAL: 2 % (ref 0–6)
ERYTHROCYTE [DISTWIDTH] IN BLOOD BY AUTOMATED COUNT: 15.1 % (ref 11.6–15.1)
GFR SERPL CREATININE-BSD FRML MDRD: 64 ML/MIN/1.73SQ M
GLUCOSE SERPL-MCNC: 123 MG/DL (ref 65–140)
HCT VFR BLD AUTO: 43.7 % (ref 36.5–49.3)
HGB BLD-MCNC: 14.2 G/DL (ref 12–17)
IGG SERPL-MCNC: 72 MG/DL (ref 700–1600)
LDH SERPL-CCNC: 237 U/L (ref 81–234)
LG PLATELETS BLD QL SMEAR: PRESENT
LYMPHOCYTES # BLD AUTO: 0.31 THOUSAND/UL (ref 0.6–4.47)
LYMPHOCYTES # BLD AUTO: 6 % (ref 14–44)
MCH RBC QN AUTO: 28.7 PG (ref 26.8–34.3)
MCHC RBC AUTO-ENTMCNC: 32.4 G/DL (ref 31.4–37.4)
MCV RBC AUTO: 89 FL (ref 82–98)
MONOCYTES # BLD AUTO: 0.15 THOUSAND/UL (ref 0–1.22)
MONOCYTES NFR BLD AUTO: 3 % (ref 4–12)
NEUTS BAND NFR BLD MANUAL: 0 % (ref 0–8)
NEUTS SEG # BLD: 4.54 THOUSAND/UL (ref 1.81–6.82)
NEUTS SEG NFR BLD AUTO: 89 % (ref 43–75)
OVALOCYTES BLD QL SMEAR: PRESENT
PLATELET # BLD AUTO: 55 THOUSANDS/UL (ref 149–390)
PLATELET BLD QL SMEAR: ABNORMAL
PMV BLD AUTO: 8.7 FL (ref 8.9–12.7)
POTASSIUM SERPL-SCNC: 4 MMOL/L (ref 3.5–5.3)
PROT SERPL-MCNC: 6 G/DL (ref 6.4–8.2)
RBC # BLD AUTO: 4.92 MILLION/UL (ref 3.88–5.62)
RETICS # AUTO: ABNORMAL 10*3/UL (ref 14356–105094)
RETICS # CALC: 2.57 % (ref 0.37–1.87)
SODIUM SERPL-SCNC: 146 MMOL/L (ref 136–145)
TOTAL CELLS COUNTED SPEC: 100
URATE SERPL-MCNC: 5.8 MG/DL (ref 4.2–8)
WBC # BLD AUTO: 5.1 THOUSAND/UL (ref 4.31–10.16)
WBC NRBC COR # BLD: 5.1 THOUSAND/UL (ref 4.31–10.16)

## 2018-10-15 PROCEDURE — 80053 COMPREHEN METABOLIC PANEL: CPT

## 2018-10-15 PROCEDURE — 82248 BILIRUBIN DIRECT: CPT

## 2018-10-15 PROCEDURE — 85045 AUTOMATED RETICULOCYTE COUNT: CPT

## 2018-10-15 PROCEDURE — 85007 BL SMEAR W/DIFF WBC COUNT: CPT

## 2018-10-15 PROCEDURE — 85027 COMPLETE CBC AUTOMATED: CPT

## 2018-10-15 PROCEDURE — 84550 ASSAY OF BLOOD/URIC ACID: CPT

## 2018-10-15 PROCEDURE — 82784 ASSAY IGA/IGD/IGG/IGM EACH: CPT

## 2018-10-15 PROCEDURE — 83615 LACTATE (LD) (LDH) ENZYME: CPT

## 2018-10-17 ENCOUNTER — ANESTHESIA EVENT (OUTPATIENT)
Dept: GASTROENTEROLOGY | Facility: HOSPITAL | Age: 63
End: 2018-10-17
Payer: MEDICARE

## 2018-10-18 ENCOUNTER — HOSPITAL ENCOUNTER (OUTPATIENT)
Facility: HOSPITAL | Age: 63
Setting detail: OUTPATIENT SURGERY
Discharge: HOME | End: 2018-10-18
Attending: INTERNAL MEDICINE | Admitting: INTERNAL MEDICINE
Payer: MEDICARE

## 2018-10-18 ENCOUNTER — ANESTHESIA (OUTPATIENT)
Dept: GASTROENTEROLOGY | Facility: HOSPITAL | Age: 63
End: 2018-10-18
Payer: MEDICARE

## 2018-10-18 VITALS
OXYGEN SATURATION: 94 % | BODY MASS INDEX: 30.22 KG/M2 | RESPIRATION RATE: 16 BRPM | WEIGHT: 228 LBS | DIASTOLIC BLOOD PRESSURE: 65 MMHG | HEART RATE: 79 BPM | HEIGHT: 73 IN | SYSTOLIC BLOOD PRESSURE: 117 MMHG | TEMPERATURE: 97.9 F

## 2018-10-18 DIAGNOSIS — K21.9 GASTROESOPHAGEAL REFLUX DISEASE WITHOUT ESOPHAGITIS: ICD-10-CM

## 2018-10-18 DIAGNOSIS — R13.19 ESOPHAGEAL DYSPHAGIA: ICD-10-CM

## 2018-10-18 LAB — GLUCOSE SERPL-MCNC: 122 MG/DL (ref 65–140)

## 2018-10-18 PROCEDURE — 82948 REAGENT STRIP/BLOOD GLUCOSE: CPT

## 2018-10-18 PROCEDURE — C1726 CATH, BAL DIL, NON-VASCULAR: HCPCS | Performed by: INTERNAL MEDICINE

## 2018-10-18 PROCEDURE — 88305 TISSUE EXAM BY PATHOLOGIST: CPT | Performed by: PATHOLOGY

## 2018-10-18 PROCEDURE — 43249 ESOPH EGD DILATION <30 MM: CPT | Performed by: INTERNAL MEDICINE

## 2018-10-18 PROCEDURE — 43239 EGD BIOPSY SINGLE/MULTIPLE: CPT | Performed by: INTERNAL MEDICINE

## 2018-10-18 RX ORDER — SODIUM CHLORIDE 9 MG/ML
125 INJECTION, SOLUTION INTRAVENOUS CONTINUOUS
Status: DISCONTINUED | OUTPATIENT
Start: 2018-10-18 | End: 2018-10-18 | Stop reason: HOSPADM

## 2018-10-18 RX ORDER — PROPOFOL 10 MG/ML
INJECTION, EMULSION INTRAVENOUS AS NEEDED
Status: DISCONTINUED | OUTPATIENT
Start: 2018-10-18 | End: 2018-10-18 | Stop reason: SURG

## 2018-10-18 RX ORDER — PANTOPRAZOLE SODIUM 40 MG/1
40 TABLET, DELAYED RELEASE ORAL 2 TIMES DAILY
Qty: 60 TABLET | Refills: 6 | Status: SHIPPED | OUTPATIENT
Start: 2018-10-18 | End: 2019-11-02 | Stop reason: SDUPTHER

## 2018-10-18 RX ADMIN — PROPOFOL 100 MG: 10 INJECTION, EMULSION INTRAVENOUS at 11:34

## 2018-10-18 RX ADMIN — PROPOFOL 100 MG: 10 INJECTION, EMULSION INTRAVENOUS at 11:35

## 2018-10-18 RX ADMIN — SODIUM CHLORIDE 125 ML/HR: 0.9 INJECTION, SOLUTION INTRAVENOUS at 10:53

## 2018-10-18 RX ADMIN — LIDOCAINE HYDROCHLORIDE 100 MG: 20 INJECTION, SOLUTION INTRAVENOUS at 11:33

## 2018-10-18 RX ADMIN — PROPOFOL 50 MG: 10 INJECTION, EMULSION INTRAVENOUS at 11:37

## 2018-10-18 RX ADMIN — PROPOFOL 50 MG: 10 INJECTION, EMULSION INTRAVENOUS at 11:40

## 2018-10-18 RX ADMIN — PROPOFOL 180 MG: 10 INJECTION, EMULSION INTRAVENOUS at 11:33

## 2018-10-18 NOTE — OP NOTE
OPERATIVE REPORT  PATIENT NAME: Laura Bonner    :    MRN: [id number]  Pt Location: [facility]    SURGERY DATE: 10/18/2018    Surgeon(s) and Role:     * Jovany Lomas MD - Primary    Preop Diagnosis:  Esophageal dysphagia [R13 10]    Post-Op Diagnosis Codes:     * Esophageal dysphagia [R13 10]    Procedure(s) (LRB):  ESOPHAGOGASTRODUODENOSCOPY (EGD) with dilation (N/A)    Specimen(s):  ID Type Source Tests Collected by Time Destination   1 : distal esophagus stricture biopsy Tissue Esophagus TISSUE EXAM Jovany Lomas MD 10/18/2018 1144    ESOPHAGOGASTRODUODENOSCOPY    PROCEDURE: EGD    SEDATION: Monitored anesthesia care, check anesthesia records    ASA Class:3     INDICATIONS:  History esophageal stricture    CONSENT:  Informed consent was obtained for the procedure, including sedation after explaining the risks and benefits of the procedure  Risks including but not limited to bleeding, perforation, infection, and missed lesion  PREPARATION:   Telemetry, pulse oximetry, blood pressure were monitored throughout the procedure  Patient was identified by myself both verbally and by visual inspection of ID band  DESCRIPTION:   Patient was placed in the left lateral decubitus position and was sedated with the above medication  The gastroscope was introduced in to the oropharynx and the esophagus was intubated under direct visualization  Scope was passed down the esophagus up to 2nd part of the duodenum  A careful inspection was made as the gastroscope was withdrawn, including a retroflexed view of the stomach; findings and interventions are described below  FINDINGS:    #1  Esophagus-focal esophageal strictures in the distal esophagus at 35 cm from the entry site  4 cm hiatal hernia seen in the distal esophagus  Stricture was dilated to 18 mm using a balloon dilator  It was not dilate any further due to thrombocytopenia    Biopsies were performed from distal esophagus for further evaluation of stricture  Proximal esophageal biopsy now perform as patient is on steroids    #2  Stomach- normal    #3  Duodenum- normal         IMPRESSIONS:      Distal esophageal stricture status post dilation and biopsy    RECOMMENDATIONS:     Anti-reflux measures  Increase PPI to b i d  Repeat endoscopy in 3-4 months    COMPLICATIONS:  None; patient tolerated the procedure well            DISPOSITION: PACU           CONDITION: Stable      SIGNATURE: Robina Xie MD  DATE: October 18, 2018  TIME: 11:45 AM

## 2018-10-18 NOTE — DISCHARGE INSTRUCTIONS
Leopold Ewings, MD Physician Signed Gastroenterology  Op Note Date of Service: 10/18/2018 11:36 AM   Case ID: [id number] Case Time: 10/18/2018 11:36 AM Surgeon: Leopold Ewings, MD   Procedure: ESOPHAGOGASTRODUODENOSCOPY (EGD) with dilation Location: [facility]          []Hide copied text  []Hover for attribution information  OPERATIVE REPORT  PATIENT NAME: Rohini Cherry    :  [id number]  MRN: [id number]  Pt Location: [facility]     SURGERY DATE: 10/18/2018     Surgeon(s) and Role:     * Leopold Ewings, MD - Primary     Preop Diagnosis:  Esophageal dysphagia [R13 10]     Post-Op Diagnosis Codes:     * Esophageal dysphagia [R13 10]     Procedure(s) (LRB):  ESOPHAGOGASTRODUODENOSCOPY (EGD) with dilation (N/A)     Specimen(s):  ID Type Source Tests Collected by Time Destination   1 : distal esophagus stricture biopsy Tissue Esophagus TISSUE EXAM Leopold Ewings, MD 10/18/2018 1144     ESOPHAGOGASTRODUODENOSCOPY     PROCEDURE: EGD     SEDATION: Monitored anesthesia care, check anesthesia records     ASA Class:3        INDICATIONS:  History esophageal stricture     CONSENT:  Informed consent was obtained for the procedure, including sedation after explaining the risks and benefits of the procedure  Risks including but not limited to bleeding, perforation, infection, and missed lesion      PREPARATION:   Telemetry, pulse oximetry, blood pressure were monitored throughout the procedure  Patient was identified by myself both verbally and by visual inspection of ID band      DESCRIPTION:   Patient was placed in the left lateral decubitus position and was sedated with the above medication  The gastroscope was introduced in to the oropharynx and the esophagus was intubated under direct visualization  Scope was passed down the esophagus up to 2nd part of the duodenum  A careful inspection was made as the gastroscope was withdrawn, including a retroflexed view of the stomach; findings and interventions are described below     FINDINGS:     #1  Esophagus-focal esophageal strictures in the distal esophagus at 35 cm from the entry site  4 cm hiatal hernia seen in the distal esophagus  Stricture was dilated to 18 mm using a balloon dilator  It was not dilate any further due to thrombocytopenia  Biopsies were performed from distal esophagus for further evaluation of stricture  Proximal esophageal biopsy now perform as patient is on steroids     #2  Stomach- normal     #3  Duodenum- normal         IMPRESSIONS:       Distal esophageal stricture status post dilation and biopsy     RECOMMENDATIONS:      Anti-reflux measures  Increase PPI to b i d  Repeat endoscopy in 3-4 months     COMPLICATIONS:  None; patient tolerated the procedure well  DISPOSITION: PACU           CONDITION: Stable        SIGNATURE: Brandon Rubio MD  DATE: October 18, 2018  TIME: 11:45 AM                       Upper Endoscopy   WHAT YOU NEED TO KNOW:   An upper endoscopy is also called an upper gastrointestinal (GI) endoscopy, or an esophagogastroduodenoscopy (EGD)  You may feel bloated, gassy, or have some abdominal discomfort after your procedure  Your throat may be sore for 24 to 36 hours  You may burp or pass gas from air that is still inside your body  DISCHARGE INSTRUCTIONS:   Call 911 if:   · You have sudden chest pain or trouble breathing  Seek care immediately if:   · You feel dizzy or faint  · You have trouble swallowing  · You have severe throat pain  · Your bowel movements are very dark or black  · Your abdomen is hard and firm and you have severe pain  · You vomit blood  Contact your healthcare provider if:   · You feel full or bloated and cannot burp or pass gas  · You have not had a bowel movement for 3 days after your procedure  · You have neck pain  · You have a fever or chills  · You have nausea or are vomiting  · You have a rash or hives      · You have questions or concerns about your endoscopy  Relieve a sore throat:  Suck on throat lozenges or crushed ice  Gargle with a small amount of warm salt water  Mix 1 teaspoon of salt and 1 cup of warm water to make salt water  Relieve gas and discomfort from bloating:  Lie on your right side with a heating pad on your abdomen  Take short walks to help pass gas  Eat small meals until bloating is relieved  Rest after your procedure:  Do not drive or make important decisions until the day after your procedure  Return to your normal activity as directed  You can usually return to work the day after your procedure  Follow up with your healthcare provider as directed:  Write down your questions so you remember to ask them during your visits  © 2017 [employer]  Information is for End User's use only and may not be sold, redistributed or otherwise used for commercial purposes  All illustrations and images included in CareNotes® are the copyrighted property of [employer]  or [employer]ava[employer]  The above information is an  only  It is not intended as medical advice for individual conditions or treatments  Talk to your doctor, nurse or pharmacist before following any medical regimen to see if it is safe and effective for you  Esophageal Dilation   WHAT YOU NEED TO KNOW:   Esophageal dilation is a procedure to widen a narrow part of your esophagus  Your healthcare provider will use a dilator (inflatable balloon or another tool that expands) to make the area wider  He may also do an endoscopy before or during your esophageal dilation  During an endoscopy, your healthcare provider will use a scope to see inside your esophagus  DISCHARGE INSTRUCTIONS:   Medicines:   · Medicines  may be given to decrease stomach acid that can irritate your esophagus  · Take your medicine as directed  Contact your healthcare provider if you think your medicine is not helping or if you have side effects   Tell him if you are allergic to any medicine  Keep a list of the medicines, vitamins, and herbs you take  Include the amounts, and when and why you take them  Bring the list or the pill bottles to follow-up visits  Carry your medicine list with you in case of an emergency  Follow up with your healthcare provider as directed:  Write down your questions so you remember to ask them during your visits  Nutrition:  You may eat foods you normally eat  Chew your food well  Eat soft foods if you still have problems swallowing  Soft foods include applesauce, bananas, cooked cereal, cottage cheese, eggs, pudding, and yogurt  Ask for more information on what types of food to eat  Contact your healthcare provider if:   · You have a fever  · You feel very full or bloated  · You have more problems swallowing food  · You have nausea or are vomiting  · You have questions or concerns about your condition or care  Seek care immediately or call 911 if:   · You vomit blood  · You are not able to swallow any food  · You have a fast heartbeat, chest pain, or sudden trouble breathing  · Your abdomen suddenly becomes tender and hard  © 2017 [facility] Information is for End User's use only and may not be sold, redistributed or otherwise used for commercial purposes  All illustrations and images included in CareNotes® are the copyrighted property of [employer]  or [employer]ava[employer]  The above information is an  only  It is not intended as medical advice for individual conditions or treatments  Talk to your doctor, nurse or pharmacist before following any medical regimen to see if it is safe and effective for you  Esophageal Stricture   WHAT YOU NEED TO KNOW:   What is an esophageal stricture? An esophageal stricture is a narrowing of your esophagus  Inflammation or damage to your esophagus may cause scar tissue that leads to narrowing  What increases my risk for an esophageal stricture? · Long-term acid reflux    · Birth defects, such as stenosis (narrowing) or diverticulosis (pouches)    · Medicines, such as aspirin, pain medicines, or antimalarial antibiotics    · Surgery, radiation, or sclerotherapy on the esophagus    · Long-term placement of a nasogastric (NG) tube, or chemicals such as household cleaning liquids that are swallowed    · Infections, skin diseases, scleroderma, esophagitis, or esophageal cancer  What are the signs and symptoms of an esophageal stricture? · Acid reflux, or burning pain in your chest    · Bitter or acid taste in your mouth    · Pain or trouble swallowing    · Frequent burping or hiccups    · Weight loss without trying  How is an esophageal stricture diagnosed? · A barium swallow  is an x-ray of your throat and esophagus  This may also be called a barium esophagram  You will drink a thick liquid called barium  Barium helps your esophagus and stomach show up better on x-rays  · A CT scan,  or CAT scan, is a type of x-ray taken of your esophagus and stomach  You may be given contrast dye to help healthcare providers see the pictures better  Tell the healthcare provider if you have ever had an allergic reaction to contrast dye  · An endoscopy  is a procedure used to find the cause of the narrowing of your esophagus  Healthcare providers use an endoscope to examine your esophagus  An endoscope is a bendable tube with a light and camera on the end  How is an esophageal stricture treated? Treatment for esophageal stricture depends on its cause  You may also need any of the following:  · Medicines  may be given to decrease stomach acid that can irritate your esophagus and stomach  · Surgery or procedures  may be needed to dilate (widen), repair, or remove part of your esophagus  How can I manage my symptoms? · Rest as needed  Slowly start to do more each day  Return to your daily activities as directed  · Ask about safe foods to eat    You may not be able to eat solid foods for a period of time  You may be allowed to drink water, broth, apple juice, or lemon-lime soft drinks  You may also suck on ice chips or eat gelatin  As you improve, you may be given soft foods to eat or thickened liquids to drink  You may return to eating normal foods as your swallowing gets better  When should I contact my healthcare provider? · You have a fever  · You are vomiting and cannot keep any food or liquids down  · You feel very full and cannot burp or vomit  · You have pain that does not decrease even after you take medicine  · Your symptoms get worse  · You have questions or concerns about your condition or care  When should I seek immediate care or call 911? · You have severe chest pain and sudden trouble breathing  · Your bowel movements are black, bloody, or tarry-looking  · Your vomit looks like coffee grounds or has blood in it  CARE AGREEMENT:   You have the right to help plan your care  Learn about your health condition and how it may be treated  Discuss treatment options with your caregivers to decide what care you want to receive  You always have the right to refuse treatment  The above information is an  only  It is not intended as medical advice for individual conditions or treatments  Talk to your doctor, nurse or pharmacist before following any medical regimen to see if it is safe and effective for you  © 2017 [facility] Information is for End User's use only and may not be sold, redistributed or otherwise used for commercial purposes  All illustrations and images included in CareNotes® are the copyrighted property of [employer]  or Maximilian Tavares  Hiatal Hernia   WHAT YOU NEED TO KNOW:   What is a hiatal hernia? A hiatal hernia is a condition that causes part of your stomach to bulge through the hiatus (small opening) in your diaphragm   The part of the stomach may move up and down, or it may get trapped above the diaphragm  What increases my risk for a hiatal hernia? The exact cause of a hiatal hernia is not known  You may have been born with a large hiatus  The following may increase your risk of a hiatal hernia:  · Obesity    · Older age    · Medical conditions such as diverticulosis or esophagitis    · Previous surgery of the esophagus or stomach or trauma such as from a motor vehicle accident  What are the types of hiatal hernia? · Type I (sliding hiatal hernia): A portion of the stomach slides in and out of the hiatus  This type is the most common and usually causes gastroesophageal reflux disease (GERD)  GERD occurs when the esophageal sphincter does not close properly and causes acid reflux  The esophageal sphincter is the lower muscle of the esophagus  · Type II (paraesophageal hiatal hernia):  Type II hiatal hernia forms when a part of the stomach squeezes through the hiatus and lies next to the esophagus  · Type III (combined):  Type III hiatal hernia is a combination of a sliding and a paraesophageal hiatal hernia  · Type IV (complex paraesophageal hiatal hernia): The whole stomach, the small and large bowels, spleen, pancreas, or liver is pushed up into the chest   What are the signs and symptoms of a hiatal hernia? The most common symptom is heartburn  This usually occurs after meals and spreads to your neck, jaw, or shoulder  You may have no signs or symptoms, or you may have any of the following:  · Abdominal pain, especially in the area just above your navel    · Bitter or acid taste in your mouth    · Trouble swallowing    · Coughing or hoarseness    · Chest pain or shortness of breath that occurs after eating    · Frequent burping or hiccups    · Uncomfortable feeling of fullness after eating  How is a hiatal hernia diagnosed? · An upper GI series test  includes x-rays of your esophagus, stomach, and your small intestines   It is also called a barium swallow test  You will be given barium (a chalky liquid) to drink before the pictures are taken  This liquid helps your stomach and intestines show up better on the x-rays  An upper GI series can show if you have an ulcer, a blocked intestine, or other problems  · An endoscopy  uses a scope to see the inside of your digestive tract  A scope is a long, bendable tube with a light on the end of it  A camera may be hooked to the scope to take pictures  How is a hiatal hernia treated? Treatment depends on the type of hiatal hernia you have and on your symptoms  You may not need any treatment  You may need any of the following:  · Medicines  may be given to relieve heartburn symptoms  These medicines help to decrease or block stomach acid  You may also be given medicines that help to tighten the esophageal sphincter  · Surgery  may be done when medicines cannot control your symptoms, or other problems are present  Your healthcare provider may also suggest surgery depending on the type of hernia you have  Your healthcare provider can put your stomach back into its normal location  He may make the hiatus (hole) smaller and anchor your stomach in your abdomen  Fundoplication is a surgery that wraps the upper part of the stomach around the esophageal sphincter to strengthen it  How can I manage symptoms? The following nutrition and lifestyle changes may be recommended to relieve symptoms of heartburn  · Avoid foods that make your symptoms worse  These may include spicy foods, fruit juices, alcohol, caffeine, chocolate, and mint  · Eat several small meals during the day  Small meals give your stomach less food to digest     · Avoid lying down and bending forward after you eat  Do not eat meals 2 to 3 hours before bedtime  This decreases your risk for reflux  · Maintain a healthy weight  If you are overweight, weight loss may help relieve your symptoms      · Sleep with your head elevated  at least 6 inches  · Do not smoke  Smoking can increase your symptoms of heartburn  When should I seek immediate care? · You have severe abdominal pain  · You try to vomit but nothing comes out (retching)  · You have severe chest pain and sudden trouble breathing  · Your bowel movements are black or bloody  · Your vomit looks like coffee grounds or has blood in it  When should I contact my healthcare provider? · Your symptoms are getting worse  · You have nausea, and you are vomiting  · You are losing weight without trying  · You have questions or concerns about your condition or care  CARE AGREEMENT:   You have the right to help plan your care  Learn about your health condition and how it may be treated  Discuss treatment options with your caregivers to decide what care you want to receive  You always have the right to refuse treatment  The above information is an  only  It is not intended as medical advice for individual conditions or treatments  Talk to your doctor, nurse or pharmacist before following any medical regimen to see if it is safe and effective for you  © 2017 [employer]  Information is for End User's use only and may not be sold, redistributed or otherwise used for commercial purposes  All illustrations and images included in CareNotes® are the copyrighted property of A [employer] M [employer]  or Maximilian Tavares

## 2018-10-18 NOTE — ANESTHESIA PREPROCEDURE EVALUATION
Review of Systems/Medical History  Patient summary reviewed        Cardiovascular  Negative cardio ROS Hyperlipidemia, Hypertension , Past MI , CAD ,    Pulmonary  Negative pulmonary ROS COPD , Sleep apnea CPAP,        GI/Hepatic  Negative GI/hepatic ROS   GERD ,        Negative  ROS        Endo/Other  Negative endo/other ROS Diabetes well controlled type 2 Oral agent,   Obesity    GYN  Negative gynecology ROS          Hematology  Negative hematology ROS Anemia ,     Musculoskeletal  Negative musculoskeletal ROS        Neurology  Negative neurology ROS   Headaches,    Psychology   Negative psychology ROS              Physical Exam    Airway    Mallampati score: II  TM Distance: >3 FB  Neck ROM: full     Dental   No notable dental hx upper dentures and lower dentures,     Cardiovascular  Comment: Negative ROS, Rhythm: regular, Rate: normal, Cardiovascular exam normal    Pulmonary  Pulmonary exam normal Breath sounds clear to auscultation,     Other Findings        Anesthesia Plan  ASA Score- 3     Anesthesia Type- general and IV sedation with anesthesia with ASA Monitors  Additional Monitors:   Airway Plan:         Plan Factors-    Induction- intravenous  Postoperative Plan-     Informed Consent- Anesthetic plan and risks discussed with patient

## 2018-10-18 NOTE — H&P
History and Physical - [facility]  Scott Cota 58 y o  male MRN: [id number]                  HPI: Scott Cota is a 58y o  year old male who presents for progressive solid food dysphagia plan for EGD with esophageal dilation  Patient does have known history of esophageal stricture last was dilated to 18 mm  REVIEW OF SYSTEMS: Per the HPI, and otherwise unremarkable  Historical Information   Past Medical History:   Diagnosis Date    Arthritis     CAD (coronary artery disease) 2004    Cellulitis of scalp     CKD (chronic kidney disease) stage 3, GFR 30-59 ml/min (HCC)     CLL (chronic lymphocytic leukemia) (HCC)     [facility] obstructive [facility] disease) [facility] [address] [facility]     Diabetes mellitus ([facility] )     Dyslipidemia     H/O blood clots     [facility]     History of TIA (transient ischemic attack)     Hyperlipidemia     Hypertension     Multiple gastric ulcers     Myocardial infarction ([facility] ) 2004    acute    Neutropenia (HCC)     Recurrent sinusitis     Sleep apnea     Thrombocytopenia (HCC)     Vertigo      Past Surgical History:   Procedure Laterality Date    ARTHROSCOPIC REPAIR ACL      lt knee    CARDIAC SURGERY      cardiac cath with stent    FOOT SURGERY      HAND SURGERY      KNEE ARTHROSCOPY      OTHER SURGICAL HISTORY      cardiac cath lesion 1, 1st adjunct treat device: stent    PORTACATH PLACEMENT      [place] ESOPHAGOGASTRODUODENOSCOPY TRANSORAL DIAGNOSTIC N/A 10/5/2017    Procedure: ESOPHAGOGASTRODUODENOSCOPY (EGD) with bx;  Surgeon: Manoj Bishop DO;  Location: [facility]; Service: Gastroenterology    [facility] ESOPHAGOGASTRODUODENOSCOPY TRANSORAL DIAGNOSTIC N/A 3/8/2018    Procedure: ESOPHAGOGASTRODUODENOSCOPY (EGD) with biopsy;  Surgeon: Manoj Bishop DO;  Location: [facility];   Service: Gastroenterology    [facility] ESOPHAGOGASTRODUODENOSCOPY TRANSORAL DIAGNOSTIC N/A 6/20/2018    Procedure: ESOPHAGOGASTRODUODENOSCOPY (EGD) with Dilation; Surgeon: Mita Barlow DO;  Location: [facility]; Service: Gastroenterology    [place] ESOPHAGOSCOPY FLEXIBLE TRANSORAL WITH BIOPSY N/A 9/2/2016    Procedure: ESOPHAGOGASTRODUODENOSCOPY (EGD); ESOPHAGEAL DILATION; ESOPHAGEAL BIOPSY;  Surgeon: Donovan Nicole MD;  Location:  MAIN OR;  Service: Thoracic    TONSILLECTOMY      UPPER GASTROINTESTINAL ENDOSCOPY      x 6     Social History   History   Alcohol Use No     Comment: social use as per [employer]     History   Drug Use No     History   Smoking Status    Former Smoker   Smokeless Tobacco    Never Used     Comment: pt refuses to answer question; current every day smoker as per  [employer]     Family History   Problem Relation Age of Onset    Leukemia Mother         chronic    Colon polyps Mother         sidmoid    No Known Problems Father        Meds/Allergies     Prescriptions Prior to Admission   Medication    acyclovir (ZOVIRAX) 400 MG tablet    aspirin 81 MG tablet    citalopram (CeleXA) 40 mg tablet    fenofibrate (TRIGLIDE) 50 MG tablet    folic acid (FOLVITE) 1 mg tablet    Ibrutinib 140 MG TABS    insulin lispro (HumaLOG) 100 units/mL injection    multivitamin (THERAGRAN) TABS    oxyCODONE (ROXICODONE) 10 MG TABS    pantoprazole (PROTONIX) 40 mg tablet    predniSONE 10 mg tablet    furosemide (LASIX) 40 mg tablet    gabapentin (NEURONTIN) 600 MG tablet    glucose monitoring kit (FREESTYLE) monitoring kit    ketorolac (TORADOL) 10 mg tablet    Lancets (FREESTYLE) lancets    obinutuzumab (GAZYVA) 1000 mg/40 mL SOLN    sodium chloride, PF, 0 9 %    VENTOLIN  (90 Base) MCG/ACT inhaler       Allergies   Allergen Reactions    Heparin Other (See Comments)     Other reaction(s): Blood Disorders  clot    Macrolides And Ketolides Other (See Comments)     Interacts with other meds he is taking       Objective     Blood pressure 157/77, pulse 74, temperature 97 9 °F (36 6 °C), temperature source Temporal, resp   rate 16, height 6' 1" (1 854 m), weight 103 kg (228 lb), SpO2 98 %  PHYSICAL EXAM    Gen: NAD  CV: RRR  CHEST: Clear  ABD: soft, NT/ND  EXT: no edema      ASSESSMENT/PLAN:  This is a 58y o  year old male here for EGD and he is stable and optimized for his procedure

## 2018-10-29 ENCOUNTER — HOSPITAL ENCOUNTER (OUTPATIENT)
Dept: INFUSION CENTER | Facility: CLINIC | Age: 63
Discharge: HOME | End: 2018-10-29
Payer: MEDICARE

## 2018-10-29 VITALS
TEMPERATURE: 97.2 F | SYSTOLIC BLOOD PRESSURE: 135 MMHG | RESPIRATION RATE: 16 BRPM | WEIGHT: 227 LBS | BODY MASS INDEX: 29.95 KG/M2 | HEART RATE: 76 BPM | DIASTOLIC BLOOD PRESSURE: 81 MMHG

## 2018-10-29 DIAGNOSIS — C91.10 CLL (CHRONIC LYMPHOCYTIC LEUKEMIA) (HCC): ICD-10-CM

## 2018-10-29 LAB
ALBUMIN SERPL BCP-MCNC: 4 G/DL (ref 3.5–5.7)
ALP SERPL-CCNC: 51 U/L (ref 46–116)
ALT SERPL W P-5'-P-CCNC: 28 U/L (ref 12–78)
ANION GAP SERPL CALCULATED.3IONS-SCNC: 10 MMOL/L (ref 4–13)
AST SERPL W P-5'-P-CCNC: 29 U/L (ref 5–45)
BASOPHILS # BLD AUTO: 0 THOUSAND/UL (ref 0–0.1)
BASOPHILS NFR MAR MANUAL: 0 % (ref 0–1)
BILIRUB SERPL-MCNC: 0.6 MG/DL (ref 0.2–1)
BUN SERPL-MCNC: 20 MG/DL (ref 5–25)
CALCIUM SERPL-MCNC: 9.1 MG/DL (ref 8.3–10.1)
CHLORIDE SERPL-SCNC: 108 MMOL/L (ref 98–108)
CO2 SERPL-SCNC: 27 MMOL/L (ref 21–32)
CREAT SERPL-MCNC: 1 MG/DL (ref 0.6–1.3)
EOSINOPHIL # BLD AUTO: 0.1 THOUSAND/UL (ref 0–0.61)
EOSINOPHIL NFR BLD MANUAL: 2 % (ref 0–6)
ERYTHROCYTE [DISTWIDTH] IN BLOOD BY AUTOMATED COUNT: 14.7 % (ref 11.6–15.1)
GFR SERPL CREATININE-BSD FRML MDRD: 79 ML/MIN/1.73SQ M
GLUCOSE SERPL-MCNC: 120 MG/DL (ref 65–140)
HCT VFR BLD AUTO: 40.5 % (ref 36.5–49.3)
HGB BLD-MCNC: 13.7 G/DL (ref 12–17)
LG PLATELETS BLD QL SMEAR: PRESENT
LYMPHOCYTES # BLD AUTO: 0.58 THOUSAND/UL (ref 0.6–4.47)
LYMPHOCYTES # BLD AUTO: 12 % (ref 14–44)
MCH RBC QN AUTO: 30.9 PG (ref 26.8–34.3)
MCHC RBC AUTO-ENTMCNC: 34 G/DL (ref 31.4–37.4)
MCV RBC AUTO: 91 FL (ref 82–98)
MONOCYTES # BLD AUTO: 0.1 THOUSAND/UL (ref 0–1.22)
MONOCYTES NFR BLD AUTO: 2 % (ref 4–12)
NEUTS BAND NFR BLD MANUAL: 0 % (ref 0–8)
NEUTS SEG # BLD: 4.03 THOUSAND/UL (ref 1.81–6.82)
NEUTS SEG NFR BLD AUTO: 84 % (ref 43–75)
OVALOCYTES BLD QL SMEAR: PRESENT
PLATELET # BLD AUTO: 72 THOUSANDS/UL (ref 149–390)
PLATELET BLD QL SMEAR: ABNORMAL
PMV BLD AUTO: 10.4 FL (ref 8.9–12.7)
POTASSIUM SERPL-SCNC: 3.8 MMOL/L (ref 3.5–5.3)
PROT SERPL-MCNC: 5.9 G/DL (ref 6.4–8.2)
RBC # BLD AUTO: 4.45 MILLION/UL (ref 3.88–5.62)
SODIUM SERPL-SCNC: 145 MMOL/L (ref 136–145)
TOTAL CELLS COUNTED SPEC: 100
WBC # BLD AUTO: 4.8 THOUSAND/UL (ref 4.31–10.16)
WBC NRBC COR # BLD: 4.8 THOUSAND/UL (ref 4.31–10.16)

## 2018-10-29 PROCEDURE — 85027 COMPLETE CBC AUTOMATED: CPT

## 2018-10-29 PROCEDURE — 80053 COMPREHEN METABOLIC PANEL: CPT

## 2018-10-29 PROCEDURE — 85007 BL SMEAR W/DIFF WBC COUNT: CPT

## 2018-10-29 NOTE — PROGRESS NOTES
Pt  Denies new symptoms or concerns  Labs obtained via port a cath  Excellent blood return noted  Future appointments reviewed   Declined AVS

## 2018-10-29 NOTE — PLAN OF CARE
Problem: PAIN - ADULT  Goal: Verbalizes/displays adequate comfort level or baseline comfort level  Interventions:  - Encourage patient to monitor pain and request assistance  - Assess pain using appropriate pain scale  - Administer analgesics based on type and severity of pain and evaluate response  - Implement non-pharmacological measures as appropriate and evaluate response  - Consider cultural and social influences on pain and pain management  - Notify physician/advanced practitioner if interventions unsuccessful or patient reports new pain  Outcome: Progressing      Problem: INFECTION - ADULT  Goal: Absence or prevention of progression during hospitalization  INTERVENTIONS:  - Assess and monitor for signs and symptoms of infection  - Monitor lab/diagnostic results  - Monitor all insertion sites, i e  indwelling lines, tubes, and drains  - Monitor endotracheal (as able) and nasal secretions for changes in amount and color  - [place] appropriate cooling/warming therapies per order  - Administer medications as ordered  - Instruct and encourage patient and family to use good hand hygiene technique  - Identify and instruct in appropriate isolation precautions for identified infection/condition  Outcome: Progressing    Goal: Absence of fever/infection during neutropenic period  INTERVENTIONS:  - Monitor WBC  - Implement neutropenic guidelines  Outcome: Progressing      Problem: Knowledge Deficit  Goal: Patient/family/caregiver demonstrates understanding of disease process, treatment plan, medications, and discharge instructions  Complete learning assessment and assess knowledge base    Interventions:  - Provide teaching at level of understanding  - Provide teaching via preferred learning methods  Outcome: Progressing

## 2018-10-30 ENCOUNTER — OFFICE VISIT (OUTPATIENT)
Dept: OBGYN CLINIC | Facility: MEDICAL CENTER | Age: 63
End: 2018-10-30
Payer: MEDICARE

## 2018-10-30 VITALS
DIASTOLIC BLOOD PRESSURE: 87 MMHG | HEART RATE: 76 BPM | HEIGHT: 73 IN | SYSTOLIC BLOOD PRESSURE: 161 MMHG | WEIGHT: 227 LBS | BODY MASS INDEX: 30.09 KG/M2

## 2018-10-30 DIAGNOSIS — G89.29 CHRONIC RIGHT SHOULDER PAIN: Primary | ICD-10-CM

## 2018-10-30 DIAGNOSIS — M25.511 CHRONIC RIGHT SHOULDER PAIN: Primary | ICD-10-CM

## 2018-10-30 PROCEDURE — 99213 OFFICE O/P EST LOW 20 MIN: CPT | Performed by: ORTHOPAEDIC SURGERY

## 2018-10-30 NOTE — PROGRESS NOTES
Orthopaedic Surgery - Office Note  Maral Damico (86 y o  male)   : 1955   MRN: [id number]  Encounter Date: 10/30/2018    Chief Complaint   Patient presents with    Right Shoulder - Follow-up    Left Shoulder - Follow-up       Assessment / Plan  Right shoulder pain and weakness, consistent with rotator cuff tendinitis vs tear    · MRI right shoulder to evaluate for rotator cuff tear  · Anti-inflammatories or Tylenol prn pain  Return for F/U after MRI  History of Present Illness  Maral Damico is a 58 y o  male who presents for follow up of right shoulder pain since about 2018  There was no injury  He was seen by me in May 2018 at which point his shoulder pain seemed to be related to his chemotherapy medications  However, he has continued to have right shoulder lateral pain that does not radiate  It is worse with reaching overhead and any use of the right arm  He does have night time pain that wakes him up  He is having a difficult time raising his arm  Denies numbness in the RUE  He has been in PT for the past 6 weeks with no improvements  Review of Systems  Pertinent items are noted in HPI  All other systems were reviewed and are negative  Physical Exam  /87   Pulse 76   Ht 6' 1" (1 854 m)   Wt 103 kg (227 lb)   BMI 29 95 kg/m²   Cons: Appears well  No apparent distress  Psych: Alert  Oriented x3  Mood and affect normal   Eyes: PERRLA, EOMI  Resp: Normal effort  No audible wheezing or stridor  CV: Palpable pulse  No discernable arrhythmia  No LE edema  Lymph:  No palpable cervical, axillary, or inguinal lymphadenopathy  Skin: Warm  No palpable masses  No visible lesions  Neuro: Normal muscle tone  Normal and symmetric DTR's  Right Shoulder Exam  Alignment / Posture:  mild scapular protraction  Inspection:  No swelling  No muscle atrophy  Palpation:  moderate subacromial tenderness  ROM:  Shoulder   Shoulder ER 70   Shoulder IR T6   Strength: Supraspinatus 4-/5  Infraspinatus 4-/5  Subscapularis 5/5  Stability:  No objective shoulder instability  Tests: (+) Porras  (+) Neer  (+) Painful arc  (-) Drop arm  (-) Belly press  Neurovascular:  Sensation intact in Ax/R/M/U nerve distributions  2+ radial pulse  Studies Reviewed  No studies to review    Procedures  No procedures today  Medical, Surgical, Family, and Social History  The patient's medical history, family history, and social history, were reviewed and updated as appropriate  Past Medical History:   Diagnosis Date    Arthritis     CAD (coronary artery disease) 2004    Cellulitis of scalp     CKD (chronic kidney disease) stage 3, GFR 30-59 ml/min [facility]     CLL [facility]ch[facility]ocytic leukemia) ([facility])     [facility]     [facility]     Dyslipidemia     H/O blood clots     Hemolytic anemia ([facility])     History of TIA (transient ischemic attack)     Hyperlipidemia     Hypertension     Multiple gastric ulcers     Myocardial infarction ([facility] ) 2004    acute    Neutropenia ([facility])     Obese     Recurrent sinusitis     Sleep apnea     Thrombocytopenia ([facility])     Vertigo        Past Surgical History:   Procedure Laterality Date    ARTHROSCOPIC REPAIR ACL      lt knee    CARDIAC SURGERY      cardiac cath with stent    FOOT SURGERY      HAND SURGERY      KNEE ARTHROSCOPY      OTHER SURGICAL HISTORY      cardiac cath lesion 1, 1st adjunct treat device: stent    PORTACATH PLACEMENT      TN ESOPHAGOGASTRODUODENOSCOPY TRANSORAL DIAGNOSTIC N/A 10/5/2017    Procedure: ESOPHAGOGASTRODUODENOSCOPY (EGD) with bx;  Surgeon: Delmar Hernandez DO;  Location: [facility]; Service: Gastroenterology    TN ESOPHAGOGASTRODUODENOSCOPY TRANSORAL DIAGNOSTIC N/A 3/8/2018    Procedure: ESOPHAGOGASTRODUODENOSCOPY (EGD) with biopsy;  Surgeon: Delmar Hernandez DO;  [facility]: [facility];   Service: Gastroenterology    TN ESOPHAGOGASTRODUODENOSCOPY TRANSORAL DIAGNOSTIC N/A 6/20/2018    Procedure: ESOPHAGOGASTRODUODENOSCOPY (EGD) with Dilation;  Surgeon: Kathrin Rogel DO;  [facility]; Service: Gastroenterology    CA ESOPHAGOGASTRODUODENOSCOPY TRANSORAL DIAGNOSTIC N/A 10/18/2018    Procedure: ESOPHAGOGASTRODUODENOSCOPY (EGD) with dilation;  Surgeon: Kelvin Cagle MD;  [facility]: [facility]; Service: Gastroenterology    CA ESOPHAGOSCOPY FLEXIBLE TRANSORAL WITH BIOPSY N/A 9/2/2016    Procedure: ESOPHAGOGASTRODUODENOSCOPY (EGD); ESOPHAGEAL DILATION; ESOPHAGEAL BIOPSY;  Surgeon: Jia Duckworth MD;  Location:  MAIN OR;  Service: Thoracic    TONSILLECTOMY      UPPER GASTROINTESTINAL ENDOSCOPY      x 6       Family History   Problem Relation Age of Onset    Leukemia Mother         chronic    Colon polyps Mother         sidmoid    No Known Problems Father        Social History     Occupational History    Not on file       Social History Main Topics    Smoking status: Former Smoker    Smokeless tobacco: Never Used      Comment: pt refuses to answer question; current every day smoker as per  [employer]    Alcohol use No      Comment: social use as per [employer]    Drug use: No    Sexual activity: Not on file       Allergies   Allergen Reactions    Heparin Other (See Comments)     Other reaction(s): Blood Disorders  clot    Macrolides And Ketolides Other (See Comments)     Interacts with other meds he is taking         Current Outpatient Prescriptions:     acyclovir (ZOVIRAX) 400 MG tablet, Take 1 tablet (400 mg total) by mouth 2 (two) times a day for 180 days, Disp: 60 tablet, Rfl: 5    aspirin 81 MG tablet, Take 81 mg by mouth every other day Every other day , Disp: , Rfl:     citalopram (CeleXA) 40 mg tablet, Take 40 mg by mouth daily, Disp: , Rfl:     fenofibrate (TRIGLIDE) 50 MG tablet, Take 134 mg by mouth daily  , Disp: , Rfl:     folic acid (FOLVITE) 1 mg tablet, Take 1 mg by mouth daily, Disp: , Rfl:     furosemide (LASIX) 40 mg tablet, furosemide 40 mg tablet, Disp: , Rfl:     gabapentin (NEURONTIN) 600 MG tablet, Take 600 mg by mouth 2 (two) times a day  , Disp: , Rfl:     glucose monitoring kit (FREESTYLE) monitoring kit, 1 each by Does not apply route as needed ( ) E 11 9, Disp: 1 each, Rfl: 0    insulin lispro (HumaLOG) 100 units/mL injection, Inject 10 Units under the skin 3 (three) times a day with meals (Patient taking differently: Inject 10 Units under the skin 3 (three) times a day with meals Given with chemo infusion monthly ), Disp: , Rfl: 0    ketorolac (TORADOL) 10 mg tablet, Take 1 tablet (10 mg total) by mouth 2 (two) times a day as needed for moderate pain, Disp: 60 tablet, Rfl: 0    Lancets (FREESTYLE) lancets, Use as instructed--test 2 times a day  E 11 9, Disp: 100 each, Rfl: 0    multivitamin (THERAGRAN) TABS, Take 1 tablet by mouth daily, Disp: , Rfl:     obinutuzumab (GAZYVA) 1000 mg/40 mL SOLN, Infuse into a venous catheter, Disp: , Rfl:     oxyCODONE (ROXICODONE) 10 MG TABS, Take 1 tablet (10 mg total) by mouth every 6 (six) hours as needed for moderate pain For ongoing pain control Max Daily Amount: 40 mg, Disp: 90 tablet, Rfl: 0    pantoprazole (PROTONIX) 40 mg tablet, Take 1 tablet (40 mg total) by mouth 2 (two) times a day for 30 days, Disp: 60 tablet, Rfl: 6    predniSONE 10 mg tablet, Take 1 tablet (10 mg total) by mouth daily (Patient taking differently: Take 5 mg by mouth daily  ), Disp: 30 tablet, Rfl: 2    sodium chloride, PF, 0 9 %, 10 mL by Intracatheter route see administration instructions 10mL into port before and after ampicillin doses, Disp: , Rfl: 0    VENTOLIN  (90 Base) MCG/ACT inhaler, INHALE 2 PUFFS 4 TIMES A DAY AS NEEDED, Disp: , Rfl: 3    Ibrutinib 140 MG TABS, Take 140 mg by mouth daily for 28 days, Disp: 30 tablet, Rfl: 0      Spike Zafar MD    Scribe Attestation    I,:    am acting as a scribe while in the presence of the attending physician :        I,:    personally performed the services described in this documentation    as scribed in my presence :

## 2018-11-05 RX ORDER — ACETAMINOPHEN 325 MG/1
650 TABLET ORAL ONCE
Status: COMPLETED | OUTPATIENT
Start: 2018-11-06 | End: 2018-11-06

## 2018-11-05 RX ORDER — SODIUM CHLORIDE 9 MG/ML
20 INJECTION, SOLUTION INTRAVENOUS CONTINUOUS
Status: DISCONTINUED | OUTPATIENT
Start: 2018-11-06 | End: 2018-11-09 | Stop reason: HOSPADM

## 2018-11-06 ENCOUNTER — HOSPITAL ENCOUNTER (OUTPATIENT)
Dept: INFUSION CENTER | Facility: CLINIC | Age: 63
Discharge: HOME | End: 2018-11-06
Payer: MEDICARE

## 2018-11-06 VITALS
SYSTOLIC BLOOD PRESSURE: 156 MMHG | HEART RATE: 78 BPM | TEMPERATURE: 98.9 F | DIASTOLIC BLOOD PRESSURE: 81 MMHG | RESPIRATION RATE: 16 BRPM

## 2018-11-06 PROCEDURE — 96415 CHEMO IV INFUSION ADDL HR: CPT

## 2018-11-06 PROCEDURE — 96375 TX/PRO/DX INJ NEW DRUG ADDON: CPT

## 2018-11-06 PROCEDURE — 96413 CHEMO IV INFUSION 1 HR: CPT

## 2018-11-06 RX ADMIN — SODIUM CHLORIDE 20 ML/HR: 0.9 INJECTION, SOLUTION INTRAVENOUS at 08:25

## 2018-11-06 RX ADMIN — DEXAMETHASONE SODIUM PHOSPHATE 20 MG: 10 INJECTION, SOLUTION INTRAMUSCULAR; INTRAVENOUS at 08:25

## 2018-11-06 RX ADMIN — DIPHENHYDRAMINE HYDROCHLORIDE 25 MG: 50 INJECTION, SOLUTION INTRAMUSCULAR; INTRAVENOUS at 08:40

## 2018-11-06 RX ADMIN — OBINUTUZUMAB 1000 MG: 1000 INJECTION, SOLUTION, CONCENTRATE INTRAVENOUS at 09:01

## 2018-11-06 RX ADMIN — ACETAMINOPHEN 650 MG: 325 TABLET, FILM COATED ORAL at 08:20

## 2018-11-06 NOTE — PROGRESS NOTES
Patient arrived to the infusion center for [facility]  Pt c/o of "hand infection" for the past month that does not improve  Pt states he cut his hand with a small piece of metal  I spoke to Dario Quiñones RN with Dr Debra Springer  Per Dr Ethan Luisch will be infused today as ordered and pt instructed to follow up with a hand surgeon for evaluation  Pt verbalized understanding and will contact [facility] for an appointment

## 2018-11-06 NOTE — PLAN OF CARE
Problem: Potential for Falls  Goal: Patient will remain free of falls  INTERVENTIONS:  - Assess patient frequently for physical needs  -  Identify cognitive and physical deficits and behaviors that affect risk of falls    -  [place] fall precautions as indicated by assessment   - Educate patient/family on patient safety including physical limitations  - Instruct patient to call for assistance with activity based on assessment  - Modify environment to reduce risk of injury  - Consider OT/PT consult to assist with strengthening/mobility   Outcome: Progressing

## 2018-11-07 ENCOUNTER — APPOINTMENT (OUTPATIENT)
Dept: RADIOLOGY | Facility: MEDICAL CENTER | Age: 63
End: 2018-11-07
Payer: MEDICARE

## 2018-11-07 ENCOUNTER — OFFICE VISIT (OUTPATIENT)
Dept: HEMATOLOGY ONCOLOGY | Facility: CLINIC | Age: 63
End: 2018-11-07
Payer: MEDICARE

## 2018-11-07 VITALS
WEIGHT: 228 LBS | HEIGHT: 73 IN | BODY MASS INDEX: 30.22 KG/M2 | RESPIRATION RATE: 18 BRPM | DIASTOLIC BLOOD PRESSURE: 84 MMHG | HEART RATE: 62 BPM | OXYGEN SATURATION: 98 % | TEMPERATURE: 97.9 F | SYSTOLIC BLOOD PRESSURE: 130 MMHG

## 2018-11-07 DIAGNOSIS — M79.5 FOREIGN BODY (FB) IN SOFT TISSUE: ICD-10-CM

## 2018-11-07 DIAGNOSIS — C91.10 CLL (CHRONIC LYMPHOCYTIC LEUKEMIA) (HCC): Primary | ICD-10-CM

## 2018-11-07 DIAGNOSIS — D59.19 OTHER AUTOIMMUNE HEMOLYTIC ANEMIAS: ICD-10-CM

## 2018-11-07 DIAGNOSIS — D75.82 HIT (HEPARIN-INDUCED THROMBOCYTOPENIA) (HCC): ICD-10-CM

## 2018-11-07 DIAGNOSIS — D69.6 THROMBOCYTOPENIA (HCC): ICD-10-CM

## 2018-11-07 PROCEDURE — 99214 OFFICE O/P EST MOD 30 MIN: CPT | Performed by: PHYSICIAN ASSISTANT

## 2018-11-07 PROCEDURE — 73130 X-RAY EXAM OF HAND: CPT

## 2018-11-07 NOTE — PROGRESS NOTES
Hematology/Oncology Outpatient Follow-up  Arielle Hebert 58 y o  male 1955 [id number]    Date:  11/7/2018      Assessment and Plan:  1  CLL (chronic lymphocytic leukemia) ([facility] )  58year old male with history of CLL  He continues to do well with Gazayva 1000 mg every 4 weeks, Ibrutinib 140 mg PO daily  He has been doing well with this regimen  Hemoglobin remains normal  Platelet count is stable  He was having weekly labs  He had labs every 2 weeks for the past 2 months  Labs continue to be stable  He may have labs once monthly  - Comprehensive metabolic panel; Standing  - IgG; Standing  - Uric acid; Standing  - LD,Blood; Standing  - Comprehensive metabolic panel  - IgG  - Uric acid  - LD,Blood  - CBC and differential; Standing  - Reticulocytes; Standing  - CBC and differential  - Reticulocytes    2  Thrombocytopenia ([facility] )  Secondary to chemotherapy treatment     3  Other autoimmune hemolytic anemias (HCC)  Continues on 5 mg PO daily  4  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)  No heparin  Patient is aware  5  Foreign body (FB) in soft tissue  Patient states that 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 with a metal object  He has a callus type area on the thenar eminence of the right hand  There is very mild erythema, no discharge  The area does have some fluctuance to it  He states that there possibly could be a piece of metal in this area  Will proceed with x-ray to better determine this  - XR hand 3+ vw right; Future      HPI:  On 3/20/17 patient found to have hemoglobin of 6 2, ,000  He was admitted to [address] [address] for blood transfusion and plan to transfer to [address] [place] [address]  On 3/20/17 WBC count 195,000, hemoglobin 4 9, platelet count 65  Direct coomb's was positive with undetectable haptoglobin  He was given 2 units of PRBCs, Solu-Medrol 1 g ×3 days and IVIG 1 g/kg daily ×3 days   His hemoglobin continued to drop  Bone marrow biopsy on 3/21 showed marrow cellularity of greater than 95% almost replaced by small lymphocytes, lambda light chain restricted, CD20 positive, CD19 positive, CD23 positive partially expressing CD11c and CD25 and CD5 positive and negative for CD 79 and CD38  He was treated with single dose of chlorambucil to control his CLL   3/22 second dose of chlorambucil, also Rituxan 375 mg/M ² autoimmune hemolytic anemia  WBC continued to rise, 266,000  Patient initiated with Venetoclax 20 mg daily  Tumor lysis monitored every 8 hours; given aggressive hydration and Lasix and remained euvolemic  3/24-WBC dropped to 112,000  3/25- WBC 63,000  3/26-WBC 15,000, , platelet count 76,303  Patient became febrile, 101 3  The cefepime initiated Venetoclax held  3/28-patient fell from bed, CT head negative  ANC 1200, cefepime discontinued and Levaquin 75 mg daily started sliding 3/29 given second dose of rituximab 375 mg/m ²   3/30-WBC meg to 14 5 thousand, platelets 18,515, Venetoclax restarted 10 mg every other day  3/31 WBC 4 4, , platelet count 21,306  4/1-4/4: WBC maintained less than 10,000, ANC and platelet count meg  He was discharged on Venetoclax 10 mg every other day  He was instructed to discontinue simvastatin, Ranexa, aspirin, metoprolol 50 mg q  day, losartan 50 mg q  day, Plavix 75 mg q  day, folic acid 193 µg b i d , prednisone 60 mg, allopurinol  He was advised to continue Levaquin 750 mg daily for 10 days, Lexapro 10 mg daily, fenofibrate 50 mg daily, gabapentin 100 mg twice daily, Protonix 40 mg daily     Imaging studies performed at [facility]:  Patient had echocardiogram while inpatient at [facility] on 3/21  This study was unremarkable  CT chest abdomen pelvis without contrast on 3/24 showed stable pulmonary nodules, patchy groundglass densities of lower lobes previously identified and which may represent volume loss or early infiltrate   Persistent diffuse bronchial wall thickening suggesting acute/chronic bronchitis changes  No bronchiectasis  No masses  Stable lymphadenopathy of mediastinum  Stable axillary lymphadenopathy  Splenomegaly 23 9 cm  Extensive lymphadenopathy throughout the entire retroperitoneal, small bowel mesentery, and pelvis  Largest lymph node in right lower quadrant measured 4 6 x 4 8  Stable enlarged left para-aortic lymph node measuring 6 2 x 6 8   4/12/17: Patient received one dose of Rituxan on 4/7/17  Venetoclax 10 mg every other day 4/5/17 - 4/9/17  Venetoclax 10 mg every day beginning 4/10/17  Monitoring CBC 3 times per week  4/28/17: patient had follow-up appointment at [facility] with Dr Ciera Nelson  She recommended to slowly increase dose of veneteclax  Also, she recommended as he has had numerous treatments with Rituxan, if antibody directed therapy becomes indicated in the future recommendation was with [facility]  She will be seeing her on a p r n  Basis      July 2017- Hemolysis  Disease not under control with veneteclax  Started prednisone 60 mg daily, folic acid, IBRUTINIB 556 mg daily and Gazyva       Sept 2017- 9/18-9/20 patient was admitted due to uncontrolled hyperglycemia with new onset DM type II due to chronic prednisone use for CLL/hemolytic anemia; also found to have profound neutropenia  Ibrutinib dose was reduced to 280 mg daily      October 2017- 10/7/17 - 10/14/17 patient was admitted due to cellulitis on his scalp     Dec 2017- Tiffanie Lot decreased to 140 mg PO daily due to thrombocytopenia      4/23 - 4/27 patient was admitted due to listeria bacteremia  He was discharged on IV ampicillin  Echo negative for vegetations  [facility]ica and [facility] was on hold at that time  Repeat CBC on 5/14/18 showed normal ANC, and hemoglobin  Platelets adequate at 83K     10/18/18 the patient EGD  This showed distal esophageal stricture  This was dilated to 18 mm using a balloon dilator    He could not dilate any further due to bleeding secondary to thrombocytopenia  Interval history: states he cut his right hand with a piece of metal  There is a callus type area on the thenar eminence      ROS: Review of Systems   Constitutional: Positive for activity change  Negative for appetite change, chills, fever and unexpected weight change  HENT: Negative for mouth sores and nosebleeds  Respiratory: Negative for cough and shortness of breath  Cardiovascular: Negative for chest pain, palpitations and leg swelling  Gastrointestinal: Negative for abdominal pain, blood in stool, constipation, diarrhea, nausea and vomiting  Genitourinary: Negative for difficulty urinating, dysuria and hematuria  Musculoskeletal: Negative for arthralgias, joint swelling and myalgias  Skin:        See HPI   Neurological: Negative for dizziness, weakness, light-headedness, numbness and headaches  Hematological: Negative  Psychiatric/Behavioral: Negative          Past Medical History:   Diagnosis Date    Arthritis     CAD (coronary artery disease) 2004    Cellulitis of scalp     CKD (chronic kidney disease) stage 3, GFR 30-59 ml/min ([facility])     CLL (chronic lymphocytic leukemia) ([facility])     COPD (chronic obstructive pulmonary disease) [facility] )     Diabetes mellitus ([facility] )     Dyslipidemia     H/O blood clots     Hemolytic anemia (HCC)     History of TIA (transient ischemic attack)     Hyperlipidemia     Hypertension     Multiple gastric ulcers     Myocardial infarction ([facility] ) 2004    acute    Neutropenia ([facility])     Obese     Recurrent sinusitis     Sleep apnea     Thrombocytopenia ([facility])     Vertigo        Past Surgical History:   Procedure Laterality Date    ARTHROSCOPIC REPAIR ACL      lt knee    CARDIAC SURGERY      cardiac cath with stent    FOOT SURGERY      HAND SURGERY      KNEE ARTHROSCOPY      OTHER SURGICAL HISTORY      cardiac cath lesion 1, 1st adjunct treat device: stent    PORTACATH PLACEMENT      [place] ESOPHAGOGASTRODUODENOSCOPY TRANSORAL DIAGNOSTIC N/A 10/5/2017    Procedure: ESOPHAGOGASTRODUODENOSCOPY (EGD) with bx;  Surgeon: Yvon Fox DO;  Location: [facility]; Service: Gastroenterology    [place] ESOPHAGOGASTRODUODENOSCOPY TRANSORAL DIAGNOSTIC N/A 3/8/2018    Procedure: ESOPHAGOGASTRODUODENOSCOPY (EGD) with biopsy;  Surgeon: Yvon Fox DO;  [facility]: [facility]; Service: Gastroenterology    [place] ESOPHAGOGASTRODUODENOSCOPY TRANSORAL DIAGNOSTIC N/A 6/20/2018    Procedure: ESOPHAGOGASTRODUODENOSCOPY (EGD) with Dilation;  Surgeon: Yvon Fox DO;  [facility]: [facility]; Service: Gastroenterology    [place] ESOPHAGOGASTRODUODENOSCOPY TRANSORAL DIAGNOSTIC N/A 10/18/2018    Procedure: ESOPHAGOGASTRODUODENOSCOPY (EGD) with dilation;  Surgeon: Milton Grubbs MD;  Location: [facility];   Service: Gastroenterology    [place] ESOPHAGOSCOPY FLEXIBLE TRANSORAL WITH BIOPSY N/A 9/2/2016    Procedure: ESOPHAGOGASTRODUODENOSCOPY (EGD); ESOPHAGEAL DILATION; ESOPHAGEAL BIOPSY;  Surgeon: Nuvia Villalobos MD;  [facility]: [facility];  Service: Thoracic    TONSILLECTOMY      UPPER GASTROINTESTINAL ENDOSCOPY      x 6       Social History     Social History    Marital status: /Civil Union     Spouse name: N/A    Number of children: N/A    Years of education: N/A     Social History Main Topics    Smoking status: Former Smoker    Smokeless tobacco: Never Used      Comment: pt refuses to answer question; current every day smoker as per  [employer]    Alcohol use No      Comment: social use as per [employer]    Drug use: No    Sexual activity: Not on file     Other Topics Concern    Not on file     Social History Narrative    No narrative on file       Family History   Problem Relation Age of Onset    Leukemia Mother         chronic    Colon polyps Mother         sidmoid    No Known Problems Father        Allergies   Allergen Reactions    Heparin Other (See Comments)     Other reaction(s): Blood Disorders  clot    Macrolides And Ketolides Other (See Comments)     Interacts with other meds he is taking         Current Outpatient Prescriptions:     acyclovir (ZOVIRAX) 400 MG tablet, Take 1 tablet (400 mg total) by mouth 2 (two) times a day for 180 days, Disp: 60 tablet, Rfl: 5    aspirin 81 MG tablet, Take 81 mg by mouth every other day Every other day , Disp: , Rfl:     citalopram (CeleXA) 40 mg tablet, Take 40 mg by mouth daily, Disp: , Rfl:     fenofibrate (TRIGLIDE) 50 MG tablet, Take 134 mg by mouth daily  , Disp: , Rfl:     folic acid (FOLVITE) 1 mg tablet, Take 1 mg by mouth daily, Disp: , Rfl:     furosemide (LASIX) 40 mg tablet, furosemide 40 mg tablet, Disp: , Rfl:     gabapentin (NEURONTIN) 600 MG tablet, Take 600 mg by mouth 2 (two) times a day  , Disp: , Rfl:     glucose monitoring kit (FREESTYLE) monitoring kit, 1 each by Does not apply route as needed ( ) E 11 9, Disp: 1 each, Rfl: 0    insulin lispro (HumaLOG) 100 units/mL injection, Inject 10 Units under the skin 3 (three) times a day with meals (Patient taking differently: Inject 10 Units under the skin 3 (three) times a day with meals Given with chemo infusion monthly ), Disp: , Rfl: 0    Lancets (FREESTYLE) lancets, Use as instructed--test 2 times a day  E 11 9, Disp: 100 each, Rfl: 0    multivitamin (THERAGRAN) TABS, Take 1 tablet by mouth daily, Disp: , Rfl:     obinutuzumab (GAZYVA) 1000 mg/40 mL SOLN, Infuse into a venous catheter, Disp: , Rfl:     oxyCODONE (ROXICODONE) 10 MG TABS, Take 1 tablet (10 mg total) by mouth every 6 (six) hours as needed for moderate pain For ongoing pain control Max Daily Amount: 40 mg, Disp: 90 tablet, Rfl: 0    pantoprazole (PROTONIX) 40 mg tablet, Take 1 tablet (40 mg total) by mouth 2 (two) times a day for 30 days, Disp: 60 tablet, Rfl: 6    predniSONE 10 mg tablet, Take 1 tablet (10 mg total) by mouth daily (Patient taking differently: Take 5 mg by mouth daily  ), Disp: 30 tablet, Rfl: 2    sodium chloride, PF, 0 9 %, 10 mL by Intracatheter route see administration instructions 10mL into port before and after ampicillin doses, Disp: , Rfl: 0    VENTOLIN  (90 Base) MCG/ACT inhaler, INHALE 2 PUFFS 4 TIMES A DAY AS NEEDED, Disp: , Rfl: 3    Ibrutinib 140 MG TABS, Take 140 mg by mouth daily for 28 days, Disp: 30 tablet, Rfl: 0  No current facility-administered medications for this visit  Facility-Administered Medications Ordered in Other Visits:     sodium chloride 0 9 % infusion, 20 mL/hr, Intravenous, Continuous, Dimitri Jang MD, Stopped at 11/06/18 1215      Physical Exam:  /84   Pulse 62   Temp 97 9 °F (36 6 °C) (Tympanic)   Resp 18   Ht 6' 1" (1 854 m)   Wt 103 kg (228 lb)   SpO2 98%   BMI 30 08 kg/m²     Physical Exam   Constitutional: He is oriented to person, place, and time  He appears well-developed and well-nourished  No distress  HENT:   Head: Normocephalic and atraumatic  Eyes: Conjunctivae are normal  No scleral icterus  Neck: Normal range of motion  Neck supple  Cardiovascular: Normal rate, regular rhythm and normal heart sounds  No murmur heard  Pulmonary/Chest: Effort normal and breath sounds normal  No respiratory distress  Port a cath present    Abdominal: Soft  There is no tenderness  Musculoskeletal: Normal range of motion  He exhibits no edema or tenderness  Lymphadenopathy:     He has no cervical adenopathy  Neurological: He is alert and oriented to person, place, and time  No cranial nerve deficit  Skin: Skin is warm and dry  Chronic bruising on forearms   2 callus areas on thenar eminence of right hand, very slight erythema, no discharge, some fluctuance    Psychiatric: He has a normal mood and affect  Vitals reviewed          Labs:  Lab Results   Component Value Date    WBC 4 80 10/29/2018    HGB 13 7 10/29/2018    HCT 40 5 10/29/2018    MCV 91 10/29/2018    PLT 72 (L) 10/29/2018     Lab Results   Component Value Date     12/29/2015    K 3 8 10/29/2018  10/29/2018    CO2 27 10/29/2018    ANIONGAP 8 12/29/2015    BUN 20 10/29/2018    CREATININE 1 00 10/29/2018    GLUCOSE 109 12/29/2015    GLUF 118 (H) 04/24/2018    CALCIUM 9 1 10/29/2018    AST 29 10/29/2018    ALT 28 10/29/2018    ALKPHOS 51 10/29/2018    PROT 5 9 (L) 12/29/2015    BILITOT 0 7 12/29/2015    EGFR 79 10/29/2018       Patient voiced understanding and agreement in the above discussion  Aware to contact our office with questions/symptoms in the interim

## 2018-11-08 ENCOUNTER — TELEPHONE (OUTPATIENT)
Dept: HEMATOLOGY ONCOLOGY | Facility: CLINIC | Age: 63
End: 2018-11-08

## 2018-11-08 DIAGNOSIS — L08.9 SKIN INFECTION: Primary | ICD-10-CM

## 2018-11-08 RX ORDER — CEPHALEXIN 500 MG/1
500 CAPSULE ORAL EVERY 12 HOURS SCHEDULED
Qty: 14 CAPSULE | Refills: 0 | Status: SHIPPED | OUTPATIENT
Start: 2018-11-08 | End: 2018-11-15

## 2018-11-08 NOTE — TELEPHONE ENCOUNTER
Reviewed xray  No mention of visible metal  Keflex BID for 1 week called into his pharmacy as he is high risk for developing cellulitis  Call with any worsening symptoms

## 2018-11-08 NOTE — TELEPHONE ENCOUNTER
PATRICK Andrade reviewed the patient's 11/7/18 hand X-ray  Patient's X-ray showed no evidence of a foreign body, such as metal, in the hand  Patient made aware he does have some osteoarthritis in this hand  Patient admits to having broke this finger in the past  Patient also made aware that Ijeoma Lloyd [place] does not want him picking at the hand wound- he is to keep it covered with a clean dressing  Ijeoma Lloyd will be sending in a prescription for Keflex  Patient knows to pick this up from his pharmacy later today

## 2018-11-08 NOTE — TELEPHONE ENCOUNTER
Megan Cano called could you please call him with his results from his xray for  His hand it is still infected    Call him on his cell phone

## 2018-11-12 ENCOUNTER — HOSPITAL ENCOUNTER (OUTPATIENT)
Dept: INFUSION CENTER | Facility: CLINIC | Age: 63
Discharge: HOME | End: 2018-11-12
Payer: MEDICARE

## 2018-11-12 ENCOUNTER — TELEPHONE (OUTPATIENT)
Dept: HEMATOLOGY ONCOLOGY | Facility: CLINIC | Age: 63
End: 2018-11-12

## 2018-11-12 DIAGNOSIS — C91.10 CLL (CHRONIC LYMPHOCYTIC LEUKEMIA) (HCC): Primary | ICD-10-CM

## 2018-11-12 DIAGNOSIS — C91.10 CHRONIC LYMPHOCYTIC LEUKEMIA (HCC): ICD-10-CM

## 2018-11-12 DIAGNOSIS — C91.10 CLL (CHRONIC LYMPHOCYTIC LEUKEMIA) (HCC): ICD-10-CM

## 2018-11-12 DIAGNOSIS — C91.10 CHRONIC LYMPHATIC LEUKEMIA (HCC): ICD-10-CM

## 2018-11-12 LAB
ALBUMIN SERPL BCP-MCNC: 4 G/DL (ref 3.5–5.7)
ALP SERPL-CCNC: 39 U/L (ref 46–116)
ALT SERPL W P-5'-P-CCNC: 30 U/L (ref 12–78)
ANION GAP SERPL CALCULATED.3IONS-SCNC: 12 MMOL/L (ref 4–13)
AST SERPL W P-5'-P-CCNC: 27 U/L (ref 5–45)
BASOPHILS # BLD AUTO: 0 THOUSAND/UL (ref 0–0.1)
BASOPHILS NFR MAR MANUAL: 0 % (ref 0–1)
BILIRUB DIRECT SERPL-MCNC: 0.18 MG/DL (ref 0–0.2)
BILIRUB SERPL-MCNC: 0.8 MG/DL (ref 0.2–1)
BUN SERPL-MCNC: 20 MG/DL (ref 5–25)
CALCIUM SERPL-MCNC: 9.3 MG/DL (ref 8.3–10.1)
CHLORIDE SERPL-SCNC: 107 MMOL/L (ref 98–108)
CO2 SERPL-SCNC: 28 MMOL/L (ref 21–32)
CREAT SERPL-MCNC: 1.1 MG/DL (ref 0.6–1.3)
EOSINOPHIL # BLD AUTO: 0.05 THOUSAND/UL (ref 0–0.61)
EOSINOPHIL NFR BLD MANUAL: 1 % (ref 0–6)
ERYTHROCYTE [DISTWIDTH] IN BLOOD BY AUTOMATED COUNT: 14.3 % (ref 11.6–15.1)
GFR SERPL CREATININE-BSD FRML MDRD: 71 ML/MIN/1.73SQ M
GLUCOSE SERPL-MCNC: 129 MG/DL (ref 65–140)
HCT VFR BLD AUTO: 43.7 % (ref 36.5–49.3)
HGB BLD-MCNC: 14.2 G/DL (ref 12–17)
IGG SERPL-MCNC: 74 MG/DL (ref 700–1600)
LDH SERPL-CCNC: 219 U/L (ref 81–234)
LG PLATELETS BLD QL SMEAR: PRESENT
LYMPHOCYTES # BLD AUTO: 0.48 THOUSAND/UL (ref 0.6–4.47)
LYMPHOCYTES # BLD AUTO: 10 % (ref 14–44)
MCH RBC QN AUTO: 29.6 PG (ref 26.8–34.3)
MCHC RBC AUTO-ENTMCNC: 32.5 G/DL (ref 31.4–37.4)
MCV RBC AUTO: 91 FL (ref 82–98)
MONOCYTES # BLD AUTO: 0.19 THOUSAND/UL (ref 0–1.22)
MONOCYTES NFR BLD AUTO: 4 % (ref 4–12)
NEUTS BAND NFR BLD MANUAL: 1 % (ref 0–8)
NEUTS SEG # BLD: 4.08 THOUSAND/UL (ref 1.81–6.82)
NEUTS SEG NFR BLD AUTO: 84 % (ref 43–75)
OVALOCYTES BLD QL SMEAR: PRESENT
PLATELET # BLD AUTO: 31 THOUSANDS/UL (ref 149–390)
PLATELET BLD QL SMEAR: ABNORMAL
POTASSIUM SERPL-SCNC: 4.3 MMOL/L (ref 3.5–5.3)
PROT SERPL-MCNC: 6.2 G/DL (ref 6.4–8.2)
RBC # BLD AUTO: 4.8 MILLION/UL (ref 3.88–5.62)
RETICS # AUTO: ABNORMAL 10*3/UL (ref 14356–105094)
RETICS # CALC: 2 % (ref 0.37–1.87)
SODIUM SERPL-SCNC: 147 MMOL/L (ref 136–145)
TOTAL CELLS COUNTED SPEC: 100
URATE SERPL-MCNC: 5.5 MG/DL (ref 4.2–8)
WBC # BLD AUTO: 4.8 THOUSAND/UL (ref 4.31–10.16)
WBC NRBC COR # BLD: 4.8 THOUSAND/UL (ref 4.31–10.16)

## 2018-11-12 PROCEDURE — 85007 BL SMEAR W/DIFF WBC COUNT: CPT

## 2018-11-12 PROCEDURE — 83615 LACTATE (LD) (LDH) ENZYME: CPT

## 2018-11-12 PROCEDURE — 82784 ASSAY IGA/IGD/IGG/IGM EACH: CPT

## 2018-11-12 PROCEDURE — 85045 AUTOMATED RETICULOCYTE COUNT: CPT

## 2018-11-12 PROCEDURE — 85027 COMPLETE CBC AUTOMATED: CPT

## 2018-11-12 PROCEDURE — 82248 BILIRUBIN DIRECT: CPT

## 2018-11-12 PROCEDURE — 80053 COMPREHEN METABOLIC PANEL: CPT

## 2018-11-12 PROCEDURE — 84550 ASSAY OF BLOOD/URIC ACID: CPT

## 2018-11-12 NOTE — PLAN OF CARE
Problem: Potential for Falls  Goal: Patient will remain free of falls  INTERVENTIONS:  - Assess patient frequently for physical needs  -  Identify cognitive and physical deficits and behaviors that affect risk of falls    -  Ashley fall precautions as indicated by assessment   - Educate patient/family on patient safety including physical limitations  - Instruct patient to call for assistance with activity based on assessment  - Modify environment to reduce risk of injury  - Consider OT/PT consult to assist with strengthening/mobility   Outcome: Progressing

## 2018-11-12 NOTE — TELEPHONE ENCOUNTER
Patient had a 11/12/18 plt count of 31,000  Patient had a Astudillo Salines treatment on 11/6/18  Per Dr Hector Fritz, patient is to hold his Ibrutinib and have a repeat CBC done on Thursday  Patient will call the [facility] infusion center to schedule the blood draw from his port  Will follow up with our financial counselors in regards to the status of his Ibrutinib prescription

## 2018-11-13 ENCOUNTER — HOSPITAL ENCOUNTER (OUTPATIENT)
Dept: MRI IMAGING | Facility: HOSPITAL | Age: 63
Discharge: HOME | End: 2018-11-13
Attending: ORTHOPAEDIC SURGERY
Payer: MEDICARE

## 2018-11-13 DIAGNOSIS — G89.29 CHRONIC RIGHT SHOULDER PAIN: ICD-10-CM

## 2018-11-13 DIAGNOSIS — M25.511 CHRONIC RIGHT SHOULDER PAIN: ICD-10-CM

## 2018-11-13 PROCEDURE — 73221 MRI JOINT UPR EXTREM W/O DYE: CPT

## 2018-11-15 ENCOUNTER — HOSPITAL ENCOUNTER (OUTPATIENT)
Dept: INFUSION CENTER | Facility: CLINIC | Age: 63
Discharge: HOME | End: 2018-11-15
Payer: MEDICARE

## 2018-11-15 DIAGNOSIS — C91.10 CLL (CHRONIC LYMPHOCYTIC LEUKEMIA) (HCC): Primary | ICD-10-CM

## 2018-11-15 DIAGNOSIS — C91.10 CLL (CHRONIC LYMPHOCYTIC LEUKEMIA) (HCC): ICD-10-CM

## 2018-11-15 LAB
BASOPHILS # BLD AUTO: 0 THOUSAND/UL (ref 0–0.1)
BASOPHILS NFR MAR MANUAL: 0 % (ref 0–1)
EOSINOPHIL # BLD AUTO: 0.09 THOUSAND/UL (ref 0–0.61)
EOSINOPHIL NFR BLD MANUAL: 2 % (ref 0–6)
ERYTHROCYTE [DISTWIDTH] IN BLOOD BY AUTOMATED COUNT: 14.3 % (ref 11.6–15.1)
HCT VFR BLD AUTO: 38.1 % (ref 36.5–49.3)
HGB BLD-MCNC: 13.7 G/DL (ref 12–17)
LG PLATELETS BLD QL SMEAR: PRESENT
LYMPHOCYTES # BLD AUTO: 0.48 THOUSAND/UL (ref 0.6–4.47)
LYMPHOCYTES # BLD AUTO: 11 % (ref 14–44)
MCH RBC QN AUTO: 32.3 PG (ref 26.8–34.3)
MCHC RBC AUTO-ENTMCNC: 35.9 G/DL (ref 31.4–37.4)
MCV RBC AUTO: 90 FL (ref 82–98)
MONOCYTES # BLD AUTO: 0.31 THOUSAND/UL (ref 0–1.22)
MONOCYTES NFR BLD AUTO: 7 % (ref 4–12)
NEUTS BAND NFR BLD MANUAL: 3 % (ref 0–8)
NEUTS SEG # BLD: 3.52 THOUSAND/UL (ref 1.81–6.82)
NEUTS SEG NFR BLD AUTO: 77 % (ref 43–75)
OVALOCYTES BLD QL SMEAR: PRESENT
PLATELET # BLD AUTO: 41 THOUSANDS/UL (ref 149–390)
PLATELET BLD QL SMEAR: ABNORMAL
PMV BLD AUTO: 9.3 FL (ref 8.9–12.7)
RBC # BLD AUTO: 4.24 MILLION/UL (ref 3.88–5.62)
TOTAL CELLS COUNTED SPEC: 100
WBC # BLD AUTO: 4.4 THOUSAND/UL (ref 4.31–10.16)
WBC NRBC COR # BLD: 4.4 THOUSAND/UL (ref 4.31–10.16)

## 2018-11-15 PROCEDURE — 85007 BL SMEAR W/DIFF WBC COUNT: CPT

## 2018-11-15 PROCEDURE — 85027 COMPLETE CBC AUTOMATED: CPT

## 2018-11-15 NOTE — PROGRESS NOTES
Patient tolerated his port flush and central lab draw well without adverse reaction  Ashley Navas RN was made aware of plt count of 41  She confirmed with Dr Son Means  Pt is ok to go home and will come back next week to re-evaluate the plt trend

## 2018-11-15 NOTE — PLAN OF CARE
Problem: Potential for Falls  Goal: Patient will remain free of falls  INTERVENTIONS:  - Assess patient frequently for physical needs  -  Identify cognitive and physical deficits and behaviors that affect risk of falls    -  [place] fall precautions as indicated by assessment   - Educate patient/family on patient safety including physical limitations  - Instruct patient to call for assistance with activity based on assessment  - Modify environment to reduce risk of injury  - Consider OT/PT consult to assist with strengthening/mobility   Outcome: Progressing

## 2018-11-16 ENCOUNTER — OFFICE VISIT (OUTPATIENT)
Dept: OBGYN CLINIC | Facility: MEDICAL CENTER | Age: 63
End: 2018-11-16
Payer: MEDICARE

## 2018-11-16 VITALS
SYSTOLIC BLOOD PRESSURE: 148 MMHG | BODY MASS INDEX: 31.54 KG/M2 | HEART RATE: 72 BPM | WEIGHT: 238 LBS | HEIGHT: 73 IN | DIASTOLIC BLOOD PRESSURE: 74 MMHG

## 2018-11-16 DIAGNOSIS — M75.51 ACUTE BURSITIS OF RIGHT SHOULDER: Primary | ICD-10-CM

## 2018-11-16 PROCEDURE — 99213 OFFICE O/P EST LOW 20 MIN: CPT | Performed by: ORTHOPAEDIC SURGERY

## 2018-11-16 PROCEDURE — 20610 DRAIN/INJ JOINT/BURSA W/O US: CPT | Performed by: ORTHOPAEDIC SURGERY

## 2018-11-16 RX ORDER — LIDOCAINE HYDROCHLORIDE 10 MG/ML
8 INJECTION, SOLUTION INFILTRATION; PERINEURAL
Status: COMPLETED | OUTPATIENT
Start: 2018-11-16 | End: 2018-11-16

## 2018-11-16 RX ORDER — METHYLPREDNISOLONE ACETATE 40 MG/ML
1 INJECTION, SUSPENSION INTRA-ARTICULAR; INTRALESIONAL; INTRAMUSCULAR; SOFT TISSUE
Status: COMPLETED | OUTPATIENT
Start: 2018-11-16 | End: 2018-11-16

## 2018-11-16 RX ADMIN — LIDOCAINE HYDROCHLORIDE 8 ML: 10 INJECTION, SOLUTION INFILTRATION; PERINEURAL at 11:43

## 2018-11-16 RX ADMIN — METHYLPREDNISOLONE ACETATE 1 ML: 40 INJECTION, SUSPENSION INTRA-ARTICULAR; INTRALESIONAL; INTRAMUSCULAR; SOFT TISSUE at 11:43

## 2018-11-16 NOTE — PROGRESS NOTES
Orthopaedic Surgery - Office Note  Carisa Moreno (85 y o  male)   : 1955   MRN: [id number]  Encounter Date: 2018    Chief Complaint   Patient presents with    Right Shoulder - Follow-up       Assessment / Plan  Right shoulder bursitis and rotator cuff tendinitis  · After discussion of risks and benefits the patient agreed to proceed with a subacromial bursa injection of the right shoulder  This was injected and prepared sterilely and tolerated well  · Continue with home exercise program for range of motion strengthening exercises of the right shoulder  · Continue with Anti-inflammatories or Tylenol prn pain  Return if symptoms worsen or fail to improve  History of Present Illness  Carisa Moreno is a 58 y o  male who presents for follow up of right shoulder pain since about 2018  There was no injury  He was seen by me in May 2018 at which point his shoulder pain seemed to be related to his chemotherapy medications  However, he has continued to have right shoulder lateral pain that does not radiate  It is worse with reaching overhead and any use of the right arm  He does have night time pain that wakes him up  He is having a difficult time raising his arm  Denies numbness in the RUE  He has been in PT for the past 6 weeks with minimal improvements  Overall he feels that it is better than when he had a big flare-up of pain though he still has soreness  He is here today for MRI results  Review of Systems  Pertinent items are noted in HPI  All other systems were reviewed and are negative  Physical Exam  /74   Pulse 72   Ht 6' 1" (1 854 m)   Wt 108 kg (238 lb)   BMI 31 40 kg/m²   Cons: Appears well  No apparent distress  Psych: Alert  Oriented x3  Mood and affect normal   Eyes: PERRLA, EOMI  Resp: Normal effort  No audible wheezing or stridor  CV: Palpable pulse  No discernable arrhythmia  No LE edema    Lymph:  No palpable cervical, axillary, or inguinal lymphadenopathy  Skin: Warm  No palpable masses  No visible lesions  Neuro: Normal muscle tone  Normal and symmetric DTR's  Right Shoulder Exam  Alignment / Posture:  mild scapular protraction  Inspection:  No swelling  No muscle atrophy  Palpation:  moderate subacromial tenderness  ROM:  Shoulder   Shoulder ER 70  Shoulder IR T6   Strength:  Supraspinatus 4-/5  Infraspinatus 4-/5  Subscapularis 5/5  Stability:  No objective shoulder instability  Tests: (+) Porras  (+) Neer  (+) Painful arc  (-) Drop arm  (-) Belly press  Neurovascular:  Sensation intact in Ax/R/M/U nerve distributions  2+ radial pulse  Studies Reviewed  I have personally reviewed pertinent films in PACS  MRI of right shoulder - From from 11/13/2018 show small subacromial spur with subscap, supraspinatus and infraspinatus insertional tendinosis including a small subacromial bursitis and there is no rotator cuff tear seen  There is mild biceps tendinosis    Large joint arthrocentesis  Date/Time: 11/16/2018 11:43 AM  Consent given by: patient  Site marked: site marked  Timeout: Immediately prior to procedure a time out was called to verify the correct patient, procedure, equipment, support staff and site/side marked as required   Supporting Documentation  Indications: pain   Procedure Details  Location: shoulder - R subacromial bursa  Needle size: 25 G  Ultrasound guidance: no  Approach: lateral  Medications administered: 8 mL lidocaine 1 %; 1 mL methylPREDNISolone acetate 40 mg/mL    Patient tolerance: patient tolerated the procedure well with no immediate complications  Dressing:  Sterile dressing applied           Medical, Surgical, Family, and Social History  The patient's medical history, family history, and social history, were reviewed and updated as appropriate      Past Medical History:   Diagnosis Date    Arthritis     CAD (coronary artery disease) 2004    Cellulitis of scalp     CKD (chronic kidney disease) stage 3, GFR 30-59 ml/min (HCC)     CLL (chronic lymphocytic leukemia) (HCC)     COPD (chronic obstructive pulmonary disease) (HCC)     Diabetes mellitus ([facility] [address] )     Dyslipidemia     H/O blood clots     Hemolytic anemia (HCC)     History of TIA (transient ischemic attack)     Hyperlipidemia     Hypertension     Multiple gastric ulcers     Myocardial infarction ([facility] [address] ) 2004    acute    Neutropenia (HCC)     Obese     Recurrent sinusitis     Sleep apnea     Thrombocytopenia (HCC)     Vertigo        Past Surgical History:   Procedure Laterality Date    ARTHROSCOPIC REPAIR ACL      lt knee    CARDIAC SURGERY      cardiac cath with stent    FOOT SURGERY      HAND SURGERY      KNEE ARTHROSCOPY      OTHER SURGICAL HISTORY      cardiac cath lesion 1, 1st adjunct treat device: stent    PORTACATH PLACEMENT      [place] ESOPHAGOGASTRODUODENOSCOPY TRANSORAL DIAGNOSTIC N/A 10/5/2017    Procedure: ESOPHAGOGASTRODUODENOSCOPY (EGD) with bx;  Surgeon: [employer] York Life InsuranceDO;  [facility]; Service: Gastroenterology    [place] ESOPHAGOGASTRODUODENOSCOPY TRANSORAL DIAGNOSTIC N/A 3/8/2018    Procedure: ESOPHAGOGASTRODUODENOSCOPY (EGD) with biopsy;  Surgeon: [employer] InsuranceDO;  [facility]; Service: Gastroenterology    [place] ESOPHAGOGASTRODUODENOSCOPY TRANSORAL DIAGNOSTIC N/A 6/20/2018    Procedure: ESOPHAGOGASTRODUODENOSCOPY (EGD) with Dilation;  Surgeon: [employer] DO Kim;  [facility]; Service: Gastroenterology    [place] ESOPHAGOGASTRODUODENOSCOPY TRANSORAL DIAGNOSTIC N/A 10/18/2018    Procedure: ESOPHAGOGASTRODUODENOSCOPY (EGD) with dilation;  Surgeon: Charles Cross MD;  [facility];   Service: Gastroenterology    [place] ESOPHAGOSCOPY FLEXIBLE TRANSORAL WITH BIOPSY N/A 9/2/2016    Procedure: ESOPHAGOGASTRODUODENOSCOPY (EGD); ESOPHAGEAL DILATION; ESOPHAGEAL BIOPSY;  Surgeon: Jacque Irwin MD;  [facility];  Service: Thoracic    TONSILLECTOMY      UPPER GASTROINTESTINAL ENDOSCOPY x 6       Family History   Problem Relation Age of Onset    Leukemia Mother         chronic    Colon polyps Mother         sidmoid    No Known Problems Father        Social History     Occupational History    Not on file       Social History Main Topics    Smoking status: Former Smoker    Smokeless tobacco: Never Used      Comment: pt refuses to answer question; current every day smoker as per  Allscripts    Alcohol use No      Comment: social use as per Allscripts    Drug use: No    Sexual activity: Not on file       Allergies   Allergen Reactions    Heparin Other (See Comments)     Other reaction(s): Blood Disorders  clot    Macrolides And Ketolides Other (See Comments)     Interacts with other meds he is taking         Current Outpatient Prescriptions:     acyclovir (ZOVIRAX) 400 MG tablet, Take 1 tablet (400 mg total) by mouth 2 (two) times a day for 180 days, Disp: 60 tablet, Rfl: 5    aspirin 81 MG tablet, Take 81 mg by mouth every other day Every other day , Disp: , Rfl:     citalopram (CeleXA) 40 mg tablet, Take 40 mg by mouth daily, Disp: , Rfl:     fenofibrate (TRIGLIDE) 50 MG tablet, Take 134 mg by mouth daily  , Disp: , Rfl:     folic acid (FOLVITE) 1 mg tablet, Take 1 mg by mouth daily, Disp: , Rfl:     furosemide (LASIX) 40 mg tablet, furosemide 40 mg tablet, Disp: , Rfl:     gabapentin (NEURONTIN) 600 MG tablet, Take 600 mg by mouth 2 (two) times a day  , Disp: , Rfl:     glucose monitoring kit (FREESTYLE) monitoring kit, 1 each by Does not apply route as needed ( ) E 11 9, Disp: 1 each, Rfl: 0    Ibrutinib 140 MG TABS, Take 140 mg by mouth daily for 28 days, Disp: 30 tablet, Rfl: 5    insulin lispro (HumaLOG) 100 units/mL injection, Inject 10 Units under the skin 3 (three) times a day with meals (Patient taking differently: Inject 10 Units under the skin 3 (three) times a day with meals Given with chemo infusion monthly ), Disp: , Rfl: 0    Lancets (FREESTYLE) lancets, Use as instructed--test 2 times a day  E 11 9, Disp: 100 each, Rfl: 0    multivitamin (THERAGRAN) TABS, Take 1 tablet by mouth daily, Disp: , Rfl:     obinutuzumab (GAZYVA) 1000 mg/40 mL SOLN, Infuse into a venous catheter, Disp: , Rfl:     oxyCODONE (ROXICODONE) 10 MG TABS, Take 1 tablet (10 mg total) by mouth every 6 (six) hours as needed for moderate pain For ongoing pain control Max Daily Amount: 40 mg, Disp: 90 tablet, Rfl: 0    pantoprazole (PROTONIX) 40 mg tablet, Take 1 tablet (40 mg total) by mouth 2 (two) times a day for 30 days, Disp: 60 tablet, Rfl: 6    predniSONE 10 mg tablet, Take 1 tablet (10 mg total) by mouth daily (Patient taking differently: Take 5 mg by mouth daily  ), Disp: 30 tablet, Rfl: 2    sodium chloride, PF, 0 9 %, 10 mL by Intracatheter route see administration instructions 10mL into port before and after ampicillin doses, Disp: , Rfl: 0    VENTOLIN  (90 Base) MCG/ACT inhaler, INHALE 2 PUFFS 4 TIMES A DAY AS NEEDED, Disp: , Rfl: 3      Carlos A Arguello PA-C    Scribe Attestation    I,:    am acting as a scribe while in the presence of the attending physician :        I,:    personally performed the services described in this documentation    as scribed in my presence :

## 2018-11-19 ENCOUNTER — TELEPHONE (OUTPATIENT)
Dept: HEMATOLOGY ONCOLOGY | Facility: CLINIC | Age: 63
End: 2018-11-19

## 2018-11-19 ENCOUNTER — HOSPITAL ENCOUNTER (OUTPATIENT)
Dept: INFUSION CENTER | Facility: CLINIC | Age: 63
Discharge: HOME | End: 2018-11-19
Payer: MEDICARE

## 2018-11-19 DIAGNOSIS — C91.10 CLL (CHRONIC LYMPHOCYTIC LEUKEMIA) (HCC): ICD-10-CM

## 2018-11-19 LAB
ALBUMIN SERPL BCP-MCNC: 3.9 G/DL (ref 3.5–5.7)
ALP SERPL-CCNC: 54 U/L (ref 46–116)
ALT SERPL W P-5'-P-CCNC: 30 U/L (ref 12–78)
ANION GAP SERPL CALCULATED.3IONS-SCNC: 8 MMOL/L (ref 4–13)
AST SERPL W P-5'-P-CCNC: 27 U/L (ref 5–45)
BASOPHILS # BLD AUTO: 0 THOUSAND/UL (ref 0–0.1)
BASOPHILS NFR MAR MANUAL: 0 % (ref 0–1)
BILIRUB SERPL-MCNC: 0.6 MG/DL (ref 0.2–1)
BUN SERPL-MCNC: 20 MG/DL (ref 5–25)
CALCIUM SERPL-MCNC: 9.4 MG/DL (ref 8.3–10.1)
CHLORIDE SERPL-SCNC: 107 MMOL/L (ref 98–108)
CO2 SERPL-SCNC: 28 MMOL/L (ref 21–32)
CREAT SERPL-MCNC: 1 MG/DL (ref 0.6–1.3)
EOSINOPHIL # BLD AUTO: 0.09 THOUSAND/UL (ref 0–0.61)
EOSINOPHIL NFR BLD MANUAL: 2 % (ref 0–6)
ERYTHROCYTE [DISTWIDTH] IN BLOOD BY AUTOMATED COUNT: 14.1 % (ref 11.6–15.1)
GFR SERPL CREATININE-BSD FRML MDRD: 79 ML/MIN/1.73SQ M
GLUCOSE SERPL-MCNC: 115 MG/DL (ref 65–140)
HCT VFR BLD AUTO: 40.1 % (ref 36.5–49.3)
HGB BLD-MCNC: 13.5 G/DL (ref 12–17)
LG PLATELETS BLD QL SMEAR: PRESENT
LYMPHOCYTES # BLD AUTO: 0.47 THOUSAND/UL (ref 0.6–4.47)
LYMPHOCYTES # BLD AUTO: 10 % (ref 14–44)
MCH RBC QN AUTO: 30.4 PG (ref 26.8–34.3)
MCHC RBC AUTO-ENTMCNC: 33.8 G/DL (ref 31.4–37.4)
MCV RBC AUTO: 90 FL (ref 82–98)
MONOCYTES # BLD AUTO: 0.33 THOUSAND/UL (ref 0–1.22)
MONOCYTES NFR BLD AUTO: 7 % (ref 4–12)
NEUTS BAND NFR BLD MANUAL: 1 % (ref 0–8)
NEUTS SEG # BLD: 3.81 THOUSAND/UL (ref 1.81–6.82)
NEUTS SEG NFR BLD AUTO: 80 % (ref 43–75)
OVALOCYTES BLD QL SMEAR: PRESENT
PLATELET # BLD AUTO: 75 THOUSANDS/UL (ref 149–390)
PLATELET BLD QL SMEAR: ABNORMAL
PMV BLD AUTO: 8.8 FL (ref 8.9–12.7)
POTASSIUM SERPL-SCNC: 3.9 MMOL/L (ref 3.5–5.3)
PROT SERPL-MCNC: 5.7 G/DL (ref 6.4–8.2)
RBC # BLD AUTO: 4.46 MILLION/UL (ref 3.88–5.62)
SODIUM SERPL-SCNC: 143 MMOL/L (ref 136–145)
TOTAL CELLS COUNTED SPEC: 100
WBC # BLD AUTO: 4.7 THOUSAND/UL (ref 4.31–10.16)
WBC NRBC COR # BLD: 4.7 THOUSAND/UL (ref 4.31–10.16)

## 2018-11-19 PROCEDURE — 80053 COMPREHEN METABOLIC PANEL: CPT

## 2018-11-19 PROCEDURE — 85027 COMPLETE CBC AUTOMATED: CPT

## 2018-11-19 PROCEDURE — 85007 BL SMEAR W/DIFF WBC COUNT: CPT

## 2018-11-19 NOTE — PLAN OF CARE
Problem: Potential for Falls  Goal: Patient will remain free of falls  INTERVENTIONS:  - Assess patient frequently for physical needs  -  Identify cognitive and physical deficits and behaviors that affect risk of falls    -  [place] fall precautions as indicated by assessment   - Educate patient/family on patient safety including physical limitations  - Instruct patient to call for assistance with activity based on assessment  - Modify environment to reduce risk of injury  - Consider OT/PT consult to assist with strengthening/mobility   Outcome: Progressing

## 2018-11-19 NOTE — PROGRESS NOTES
Platelet count = 75  Port flushed with saline and d/c'd per protocol  Pt was given copy of CBC    Pt declined AVS

## 2018-11-19 NOTE — TELEPHONE ENCOUNTER
Called and left a voice message for the patient making him aware that Dr Golden Johnson is ok with him resuming the [address] [place] [address] Avenue  Patient is to call back with any other questions or concerns

## 2018-11-19 NOTE — TELEPHONE ENCOUNTER
Pt is calling to see if he should restart his meds now that his platelets are up? Please address/ advise   Thanks

## 2018-11-19 NOTE — PROGRESS NOTES
Addendum 11/19/18: Patient did not return for therapy after this visit on 10/18/18  No return phone call  Now discharged from my care

## 2018-12-03 ENCOUNTER — DOCUMENTATION (OUTPATIENT)
Dept: HEMATOLOGY ONCOLOGY | Facility: CLINIC | Age: 63
End: 2018-12-03

## 2018-12-03 RX ORDER — ACETAMINOPHEN 325 MG/1
650 TABLET ORAL ONCE
Status: COMPLETED | OUTPATIENT
Start: 2018-12-04 | End: 2018-12-04

## 2018-12-03 RX ORDER — SODIUM CHLORIDE 9 MG/ML
20 INJECTION, SOLUTION INTRAVENOUS CONTINUOUS
Status: DISCONTINUED | OUTPATIENT
Start: 2018-12-04 | End: 2018-12-07 | Stop reason: HOSPADM

## 2018-12-03 NOTE — PROGRESS NOTES
[id number]  Oralia Amaro renewal application submitted for [facility] [employer]  Application expires 86-15-88

## 2018-12-04 ENCOUNTER — HOSPITAL ENCOUNTER (OUTPATIENT)
Dept: INFUSION CENTER | Facility: CLINIC | Age: 63
Discharge: HOME | End: 2018-12-04
Payer: MEDICARE

## 2018-12-04 VITALS
RESPIRATION RATE: 18 BRPM | SYSTOLIC BLOOD PRESSURE: 171 MMHG | HEART RATE: 89 BPM | TEMPERATURE: 96.4 F | DIASTOLIC BLOOD PRESSURE: 89 MMHG

## 2018-12-04 LAB
BASOPHILS # BLD AUTO: 0 THOUSAND/UL (ref 0–0.1)
BASOPHILS NFR MAR MANUAL: 0 % (ref 0–1)
EOSINOPHIL # BLD AUTO: 0.07 THOUSAND/UL (ref 0–0.61)
EOSINOPHIL NFR BLD MANUAL: 2 % (ref 0–6)
ERYTHROCYTE [DISTWIDTH] IN BLOOD BY AUTOMATED COUNT: 14 % (ref 11.6–15.1)
HCT VFR BLD AUTO: 41.5 % (ref 36.5–49.3)
HGB BLD-MCNC: 13.4 G/DL (ref 12–17)
LYMPHOCYTES # BLD AUTO: 0.35 THOUSAND/UL (ref 0.6–4.47)
LYMPHOCYTES # BLD AUTO: 10 % (ref 14–44)
MCH RBC QN AUTO: 29.5 PG (ref 26.8–34.3)
MCHC RBC AUTO-ENTMCNC: 32.3 G/DL (ref 31.4–37.4)
MCV RBC AUTO: 91 FL (ref 82–98)
MONOCYTES # BLD AUTO: 0.25 THOUSAND/UL (ref 0–1.22)
MONOCYTES NFR BLD AUTO: 7 % (ref 4–12)
NEUTS BAND NFR BLD MANUAL: 1 % (ref 0–8)
NEUTS SEG # BLD: 2.84 THOUSAND/UL (ref 1.81–6.82)
NEUTS SEG NFR BLD AUTO: 80 % (ref 43–75)
OVALOCYTES BLD QL SMEAR: PRESENT
PLATELET # BLD AUTO: 64 THOUSANDS/UL (ref 149–390)
PLATELET BLD QL SMEAR: ABNORMAL
PMV BLD AUTO: 9.4 FL (ref 8.9–12.7)
RBC # BLD AUTO: 4.55 MILLION/UL (ref 3.88–5.62)
TOTAL CELLS COUNTED SPEC: 100
WBC # BLD AUTO: 3.5 THOUSAND/UL (ref 4.31–10.16)
WBC NRBC COR # BLD: 3.5 THOUSAND/UL (ref 4.31–10.16)

## 2018-12-04 PROCEDURE — 96375 TX/PRO/DX INJ NEW DRUG ADDON: CPT

## 2018-12-04 PROCEDURE — 85027 COMPLETE CBC AUTOMATED: CPT | Performed by: INTERNAL MEDICINE

## 2018-12-04 PROCEDURE — 85007 BL SMEAR W/DIFF WBC COUNT: CPT | Performed by: INTERNAL MEDICINE

## 2018-12-04 PROCEDURE — 96415 CHEMO IV INFUSION ADDL HR: CPT

## 2018-12-04 PROCEDURE — 96413 CHEMO IV INFUSION 1 HR: CPT

## 2018-12-04 RX ADMIN — DIPHENHYDRAMINE HYDROCHLORIDE 25 MG: 50 INJECTION, SOLUTION INTRAMUSCULAR; INTRAVENOUS at 08:37

## 2018-12-04 RX ADMIN — SODIUM CHLORIDE 20 ML/HR: 0.9 INJECTION, SOLUTION INTRAVENOUS at 08:35

## 2018-12-04 RX ADMIN — OBINUTUZUMAB 1000 MG: 1000 INJECTION, SOLUTION, CONCENTRATE INTRAVENOUS at 10:17

## 2018-12-04 RX ADMIN — DEXAMETHASONE SODIUM PHOSPHATE 20 MG: 10 INJECTION, SOLUTION INTRAMUSCULAR; INTRAVENOUS at 09:08

## 2018-12-04 RX ADMIN — ACETAMINOPHEN 650 MG: 325 TABLET, FILM COATED ORAL at 08:35

## 2018-12-04 NOTE — PROGRESS NOTES
Patient tolerated treatment well  Bam Canales notified of plt count  OK to treat with plt 64k  AVS provided  Discharged in stable condition

## 2018-12-04 NOTE — PLAN OF CARE
Problem: Potential for Falls  Goal: Patient will remain free of falls  INTERVENTIONS:  - Assess patient frequently for physical needs  -  Identify cognitive and physical deficits and behaviors that affect risk of falls    -  [place] fall precautions as indicated by assessment   - Educate patient/family on patient safety including physical limitations  - Instruct patient to call for assistance with activity based on assessment  - Modify environment to reduce risk of injury  - Consider OT/PT consult to assist with strengthening/mobility   Outcome: Progressing

## 2018-12-05 ENCOUNTER — OFFICE VISIT (OUTPATIENT)
Dept: ENDOCRINOLOGY | Facility: CLINIC | Age: 63
End: 2018-12-05
Payer: MEDICARE

## 2018-12-05 VITALS
DIASTOLIC BLOOD PRESSURE: 82 MMHG | HEIGHT: 73 IN | WEIGHT: 235 LBS | SYSTOLIC BLOOD PRESSURE: 160 MMHG | BODY MASS INDEX: 31.14 KG/M2

## 2018-12-05 DIAGNOSIS — E09.9 STEROID-INDUCED DIABETES (HCC): Primary | ICD-10-CM

## 2018-12-05 DIAGNOSIS — I10 HYPERTENSION, UNSPECIFIED TYPE: ICD-10-CM

## 2018-12-05 DIAGNOSIS — E78.01 FAMILIAL HYPERCHOLESTEROLEMIA: ICD-10-CM

## 2018-12-05 DIAGNOSIS — T38.0X5A STEROID-INDUCED DIABETES (HCC): Primary | ICD-10-CM

## 2018-12-05 PROCEDURE — 99215 OFFICE O/P EST HI 40 MIN: CPT | Performed by: NURSE PRACTITIONER

## 2018-12-05 NOTE — PROGRESS NOTES
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  Reports no complications of diabetes  Last A1C 5 8  Review of BG log shows he is fairly well controlled  Only has excurions into the 200-300s on chemo days  He received monthly chemo infusions for chronic lymphocytic leukemia  Average   Denies recent illness or hospitalizations  Denies recent severe hypoglycemic or severe hyperglycemic episodes  Denies any issues with his current regimen  Home glucose monitoring: are performed sporadically    Home blood glucose readings:   Before breakfast: 80-160s  Before lunch: does not check   Before dinner: does not check   Bedtime: does not check     Current regimen: Humalog 5-10 units   compliant all of the timedenies any side effects from current medications     Hypoglycemic episodes: No never   H/o of hypoglycemia causing hospitalization or Intervention such as glucagon injection or ambulance call No     Last Eye Exam: yearly, no retinopathy   Last Foot Exam: does not see podiatrist     Has hypertension: not currently on medication  Has hyperlipidemia: Taking Fenofibrate       Patient Active Problem List   Diagnosis    CAD (coronary artery disease)    C[facility]ocytic [facility]) [facility] [address] [facility]    Hyperlipidemia    Hypertension    History of TIA (transient ischemic attack)    Gastroesophageal reflux disease with esophagitis    [facility]ophagitis [facility])    CKD (chronic kidney disease) stage 3, GFR 30-59 ml/min ([facility])    H/O blood clots    Hemolytic anemia (HCC)    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[facility])    Anemia, chronic disease    Chronic lymphocytic leukemia (HCC)    Leukopenia due to antineoplastic chemotherapy (HCC)    Hyperglycemia    Cellulitis of head or scalp    [place]enia ([place] [address] )    Headache around the eyes    Gastroesophageal reflux disease without esophagitis    Colitis, acute    Listeria bacteremia    Thrombocytopenia (HCC)    Diabetes mellitus ([place] [address] )    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HCC)    Chronic right shoulder pain    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[place] [address] )    Ulcer of esophagus without bleeding    Esophageal stricture    Dysphagia    Esophageal dysphagia    Acute bursitis of right shoulder      Past Medical History:   Diagnosis Date    Arthritis     CAD (coronary artery disease) 2004    Cellulitis of scalp     CKD (chronic kidney disease) stage 3, GFR 30-59 ml/min (HCC)     CLL (chronic lymphocytic leukemia) (HCC)     COPD (chronic obstructive pulmonary disease) ([place] [address] )     Diabetes mellitus ([place] [address] )     Dyslipidemia     H/O blood clots     [facility])     History of TIA (transient ischemic attack)     Hyperlipidemia     Hypertension     Multiple gastric ulcers     Myocardial infarction ([place] [address] ) 2004    acute    Neutropenia (HCC)     Obese     Recurrent sinusitis     Sleep apnea     Thrombocytopenia (HCC)     Vertigo       Past Surgical History:   Procedure Laterality Date    ARTHROSCOPIC REPAIR ACL      lt knee    CARDIAC SURGERY      cardiac cath with stent    FOOT SURGERY      HAND SURGERY      KNEE ARTHROSCOPY      OTHER SURGICAL HISTORY      cardiac cath lesion 1, 1st adjunct treat device: stent    PORTACATH PLACEMENT      [place] ESOPHAGOGASTRODUODENOSCOPY TRANSORAL DIAGNOSTIC N/A 10/5/2017    Procedure: ESOPHAGOGASTRODUODENOSCOPY (EGD) with bx;  Surgeon: Alisia Hutchins DO;  [facility]; Service: Gastroenterology    [place] ESOPHAGOGASTRODUODENOSCOPY TRANSORAL DIAGNOSTIC N/A 3/8/2018    Procedure: ESOPHAGOGASTRODUODENOSCOPY (EGD) with biopsy;  Surgeon: Alisia Hutchins DO;  [facility];   Service: Gastroenterology    [place] ESOPHAGOGASTRODUODENOSCOPY TRANSORAL DIAGNOSTIC N/A 6/20/2018    Procedure: ESOPHAGOGASTRODUODENOSCOPY (EGD) with Dilation;  Surgeon: Uziel Oliveira DO;  [facility]; Service: Gastroenterology    UT ESOPHAGOGASTRODUODENOSCOPY TRANSORAL DIAGNOSTIC N/A 10/18/2018    Procedure: ESOPHAGOGASTRODUODENOSCOPY (EGD) with dilation;  Surgeon: Montse Paniagua MD;  [facility];   Service: Gastroenterology    UT ESOPHAGOSCOPY FLEXIBLE TRANSORAL WITH BIOPSY N/A 9/2/2016    Procedure: ESOPHAGOGASTRODUODENOSCOPY (EGD); ESOPHAGEAL DILATION; ESOPHAGEAL BIOPSY;  Surgeon: Johnna Wallis MD;  Location:  MAIN OR;  Service: Thoracic    TONSILLECTOMY      UPPER GASTROINTESTINAL ENDOSCOPY      x 6      Family History   Problem Relation Age of Onset    Leukemia Mother         chronic    Colon polyps Mother         sidmoid    No Known Problems Father      Social History   Substance Use Topics    Smoking status: Former Smoker    Smokeless tobacco: Never Used      Comment: pt refuses to answer question; current every day smoker as per  Allscripts    Alcohol use No      Comment: social use as per Allscripts     Allergies   Allergen Reactions    Heparin Other (See Comments)     Other reaction(s): Blood Disorders  clot    Macrolides And Ketolides Other (See Comments)     Interacts with other meds he is taking         Current Outpatient Prescriptions:     acyclovir (ZOVIRAX) 400 MG tablet, Take 1 tablet (400 mg total) by mouth 2 (two) times a day for 180 days, Disp: 60 tablet, Rfl: 5    aspirin 81 MG tablet, Take 81 mg by mouth every other day Every other day , Disp: , Rfl:     citalopram (CeleXA) 40 mg tablet, Take 40 mg by mouth daily, Disp: , Rfl:     fenofibrate (TRIGLIDE) 50 MG tablet, Take 134 mg by mouth daily  , Disp: , Rfl:     folic acid (FOLVITE) 1 mg tablet, Take 1 mg by mouth daily, Disp: , Rfl:     furosemide (LASIX) 40 mg tablet, furosemide 40 mg tablet, Disp: , Rfl:     gabapentin (NEURONTIN) 600 MG tablet, Take 600 mg by mouth daily  , Disp: , Rfl:     glucose monitoring kit (FREESTYLE) monitoring kit, 1 each by Does not apply route as needed ( ) E 11 9, Disp: 1 each, Rfl: 0    Ibrutinib 140 MG TABS, Take 140 mg by mouth daily for 28 days, Disp: 30 tablet, Rfl: 5    insulin lispro (HumaLOG) 100 units/mL injection, Inject 10 Units under the skin 3 (three) times a day before meals Given with chemo infusion monthly, Disp: 4 mL, Rfl: 6    Lancets (FREESTYLE) lancets, Use as instructed--test 2 times a day  E 11 9, Disp: 100 each, Rfl: 0    multivitamin (THERAGRAN) TABS, Take 1 tablet by mouth daily, Disp: , Rfl:     obinutuzumab (GAZYVA) 1000 mg/40 mL SOLN, Infuse into a venous catheter, Disp: , Rfl:     oxyCODONE (ROXICODONE) 10 MG TABS, Take 1 tablet (10 mg total) by mouth every 6 (six) hours as needed for moderate pain For ongoing pain control Max Daily Amount: 40 mg, Disp: 90 tablet, Rfl: 0    predniSONE 10 mg tablet, Take 1 tablet (10 mg total) by mouth daily (Patient taking differently: Take 5 mg by mouth daily  ), Disp: 30 tablet, Rfl: 2    sodium chloride, PF, 0 9 %, 10 mL by Intracatheter route see administration instructions 10mL into port before and after ampicillin doses, Disp: , Rfl: 0    VENTOLIN  (90 Base) MCG/ACT inhaler, INHALE 2 PUFFS 4 TIMES A DAY AS NEEDED, Disp: , Rfl: 3    Insulin Syringe-Needle U-100 30G X 1/2" 0 3 ML MISC, by Does not apply route 2 (two) times a day, Disp: 100 each, Rfl: 6    pantoprazole (PROTONIX) 40 mg tablet, Take 1 tablet (40 mg total) by mouth 2 (two) times a day for 30 days, Disp: 60 tablet, Rfl: 6  No current facility-administered medications for this visit  Facility-Administered Medications Ordered in Other Visits:     sodium chloride 0 9 % infusion, 20 mL/hr, Intravenous, Continuous, Emma Kuhn MD, Stopped at 12/04/18 1450    Review of Systems   Constitutional: Negative for activity change, appetite change and fatigue  HENT: Negative for sore throat, trouble swallowing and voice change  Eyes: Negative for visual disturbance  Respiratory: Negative for choking, chest tightness and shortness of breath  Cardiovascular: Negative for chest pain, palpitations and leg swelling  Gastrointestinal: Negative for abdominal pain, constipation and diarrhea  Endocrine: Negative for cold intolerance, heat intolerance, polydipsia, polyphagia and polyuria  Genitourinary: Negative for frequency  Musculoskeletal: Negative for arthralgias and myalgias  Skin: Negative for rash  Neurological: Negative for dizziness and syncope  Hematological: Negative for adenopathy  Psychiatric/Behavioral: Negative for sleep disturbance  All other systems reviewed and are negative  Physical Exam:  Body mass index is 31 kg/m²  /82   Ht 6' 1" (1 854 m)   Wt 107 kg (235 lb)   BMI 31 00 kg/m²    Wt Readings from Last 3 Encounters:   12/05/18 107 kg (235 lb)   11/16/18 108 kg (238 lb)   11/07/18 103 kg (228 lb)       Physical Exam   Constitutional: He is oriented to person, place, and time  He appears well-developed and well-nourished  No distress  HENT:   Head: Normocephalic and atraumatic  Mouth/Throat: Oropharynx is clear and moist    Eyes: Pupils are equal, round, and reactive to light  Conjunctivae and EOM are normal    Neck: Normal range of motion  Neck supple  No thyromegaly present  Cardiovascular: Normal rate, regular rhythm and normal heart sounds  No murmur heard  Pulmonary/Chest: Effort normal and breath sounds normal  No respiratory distress  He has no wheezes  He has no rales  Abdominal: Soft  Bowel sounds are normal  He exhibits no distension  There is no tenderness  Musculoskeletal: Normal range of motion  He exhibits no edema  Lymphadenopathy:     He has no cervical adenopathy  Neurological: He is alert and oriented to person, place, and time  Skin: Skin is warm and dry  Psychiatric: He has a normal mood and affect  Vitals reviewed      Diabetic Foot Exam: not performed, denies n/t or pain    Labs: Lab Results   Component Value Date    HGBA1C 5 8 06/11/2018       Lab Results   Component Value Date     12/29/2015    K 3 9 11/19/2018     11/19/2018    CO2 28 11/19/2018    ANIONGAP 8 12/29/2015    BUN 20 11/19/2018    CREATININE 1 00 11/19/2018    GLUCOSE 109 12/29/2015    GLUF 118 (H) 04/24/2018    CALCIUM 9 4 11/19/2018    AST 27 11/19/2018    ALT 30 11/19/2018    ALKPHOS 54 11/19/2018    PROT 5 9 (L) 12/29/2015    BILITOT 0 7 12/29/2015    EGFR 79 11/19/2018         Lab Results   Component Value Date    CHOL 122 02/11/2014    HDL 29 (L) 11/06/2017    TRIG 72 11/06/2017         Impression & Plan:    Problem List Items Addressed This Visit     Hyperlipidemia     Stable, continue fenofibrate          Relevant Orders    Lipid Panel with Direct LDL reflex    Hypertension     BP elevated, will continue to monitor for now  Has been stable in the past           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[facility] [address] ) - Primary     Well controlled, continue current regimen for now  Relevant Medications    insulin lispro (HumaLOG) 100 units/mL injection    Insulin Syringe-Needle U-100 30G X 1/2" 0 3 ML MISC    Other Relevant Orders    Hemoglobin A1C    Comprehensive metabolic panel    Microalbumin / creatinine urine ratio    T4, free    TSH, 3rd generation          Orders Placed This Encounter   Procedures    Hemoglobin A1C     Standing Status:   Future     Standing Expiration Date:   12/5/2019    Comprehensive metabolic panel     This is a patient instruction: Patient fasting for 8 hours or longer recommended  Standing Status:   Future     Standing Expiration Date:   12/5/2019    Lipid Panel with Direct LDL reflex     This is a patient instruction: This test requires patient fasting for 10-12 hours or longer  Drinking of black coffee or black tea is acceptable       Standing Status:   Future     Standing Expiration Date:   12/5/2019    Microalbumin / creatinine urine ratio     Standing Status:   Future Standing Expiration Date:   12/5/2019    T4, free     Standing Status:   Future     Standing Expiration Date:   12/5/2019    TSH, 3rd generation     This is a patient instruction: This test is non-fasting  Please drink two glasses of water morning of bloodwork  Standing Status:   Future     Standing Expiration Date:   12/5/2019       Discussed with the patient and all questioned fully answered  He will call me if any problems arise  Follow-up appointment in 3 months       Counseled patient on diagnostic results, prognosis, risk and benefit of treatment options, instruction for management, importance of treatment compliance, Risk  factor reduction and impressions      Any Martin [address]

## 2018-12-10 ENCOUNTER — HOSPITAL ENCOUNTER (OUTPATIENT)
Dept: INFUSION CENTER | Facility: CLINIC | Age: 63
Discharge: HOME | End: 2018-12-10
Payer: MEDICARE

## 2018-12-10 ENCOUNTER — TELEPHONE (OUTPATIENT)
Dept: ENDOCRINOLOGY | Facility: CLINIC | Age: 63
End: 2018-12-10

## 2018-12-10 DIAGNOSIS — C91.10 CLL (CHRONIC LYMPHOCYTIC LEUKEMIA) (HCC): ICD-10-CM

## 2018-12-10 LAB
ALBUMIN SERPL BCP-MCNC: 3.7 G/DL (ref 3.5–5.7)
ALP SERPL-CCNC: 53 U/L (ref 46–116)
ALT SERPL W P-5'-P-CCNC: 32 U/L (ref 12–78)
ANION GAP SERPL CALCULATED.3IONS-SCNC: 5 MMOL/L (ref 4–13)
ANISOCYTOSIS BLD QL SMEAR: PRESENT
AST SERPL W P-5'-P-CCNC: 24 U/L (ref 5–45)
BASOPHILS # BLD AUTO: 0 THOUSAND/UL (ref 0–0.1)
BASOPHILS NFR MAR MANUAL: 0 % (ref 0–1)
BILIRUB SERPL-MCNC: 0.7 MG/DL (ref 0.2–1)
BUN SERPL-MCNC: 20 MG/DL (ref 5–25)
CALCIUM SERPL-MCNC: 9.2 MG/DL (ref 8.3–10.1)
CHLORIDE SERPL-SCNC: 109 MMOL/L (ref 98–108)
CO2 SERPL-SCNC: 27 MMOL/L (ref 21–32)
CREAT SERPL-MCNC: 1.1 MG/DL (ref 0.6–1.3)
EOSINOPHIL # BLD AUTO: 0 THOUSAND/UL (ref 0–0.61)
EOSINOPHIL NFR BLD MANUAL: 0 % (ref 0–6)
ERYTHROCYTE [DISTWIDTH] IN BLOOD BY AUTOMATED COUNT: 14.2 % (ref 11.6–15.1)
GFR SERPL CREATININE-BSD FRML MDRD: 71 ML/MIN/1.73SQ M
GLUCOSE SERPL-MCNC: 133 MG/DL (ref 65–140)
HCT VFR BLD AUTO: 43.8 % (ref 36.5–49.3)
HGB BLD-MCNC: 14.3 G/DL (ref 12–17)
IGG SERPL-MCNC: 64 MG/DL (ref 700–1600)
LDH SERPL-CCNC: 228 U/L (ref 81–234)
LYMPHOCYTES # BLD AUTO: 0.41 THOUSAND/UL (ref 0.6–4.47)
LYMPHOCYTES # BLD AUTO: 9 % (ref 14–44)
MCH RBC QN AUTO: 29.7 PG (ref 26.8–34.3)
MCHC RBC AUTO-ENTMCNC: 32.8 G/DL (ref 31.4–37.4)
MCV RBC AUTO: 91 FL (ref 82–98)
MONOCYTES # BLD AUTO: 0.36 THOUSAND/UL (ref 0–1.22)
MONOCYTES NFR BLD AUTO: 8 % (ref 4–12)
NEUTS BAND NFR BLD MANUAL: 9 % (ref 0–8)
NEUTS SEG # BLD: 3.74 THOUSAND/UL (ref 1.81–6.82)
NEUTS SEG NFR BLD AUTO: 74 % (ref 43–75)
PLATELET # BLD AUTO: 41 THOUSANDS/UL (ref 149–390)
PLATELET BLD QL SMEAR: ABNORMAL
PMV BLD AUTO: 9.3 FL (ref 8.9–12.7)
POTASSIUM SERPL-SCNC: 4 MMOL/L (ref 3.5–5.3)
PROT SERPL-MCNC: 6 G/DL (ref 6.4–8.2)
RBC # BLD AUTO: 4.83 MILLION/UL (ref 3.88–5.62)
RETICS # AUTO: NORMAL 10*3/UL (ref 14356–105094)
RETICS # CALC: 1.78 % (ref 0.37–1.87)
SODIUM SERPL-SCNC: 141 MMOL/L (ref 136–145)
TOTAL CELLS COUNTED SPEC: 100
URATE SERPL-MCNC: 4.6 MG/DL (ref 4.2–8)
WBC # BLD AUTO: 4.5 THOUSAND/UL (ref 4.31–10.16)
WBC NRBC COR # BLD: 4.5 THOUSAND/UL (ref 4.31–10.16)

## 2018-12-10 PROCEDURE — 83615 LACTATE (LD) (LDH) ENZYME: CPT

## 2018-12-10 PROCEDURE — 85045 AUTOMATED RETICULOCYTE COUNT: CPT

## 2018-12-10 PROCEDURE — 85007 BL SMEAR W/DIFF WBC COUNT: CPT

## 2018-12-10 PROCEDURE — 84550 ASSAY OF BLOOD/URIC ACID: CPT

## 2018-12-10 PROCEDURE — 80053 COMPREHEN METABOLIC PANEL: CPT

## 2018-12-10 PROCEDURE — 85027 COMPLETE CBC AUTOMATED: CPT

## 2018-12-10 PROCEDURE — 82784 ASSAY IGA/IGD/IGG/IGM EACH: CPT

## 2018-12-10 NOTE — TELEPHONE ENCOUNTER
Patient called to inquire about his insulin  prescribed on 12/5/2018  He said he got a message from [facility], that his prescriptions were ready  He went over to pick them up and only the syringes were ready  Patient states he called on Friday morning and left a message that he was having an issue with his insulin and could someone please call him back  He did receive a phone call and is very upset  I apologized and gave him my direct line at [phone] for any other concerns  I informed I would call the prescription in for him ASAP  I called [facility][id number] and verbally requested his RX from the pharmacist  She confirmed and stated she would fill it right away

## 2018-12-12 ENCOUNTER — OFFICE VISIT (OUTPATIENT)
Dept: HEMATOLOGY ONCOLOGY | Facility: CLINIC | Age: 63
End: 2018-12-12
Payer: MEDICARE

## 2018-12-12 VITALS
WEIGHT: 227 LBS | HEIGHT: 72 IN | DIASTOLIC BLOOD PRESSURE: 86 MMHG | HEART RATE: 71 BPM | OXYGEN SATURATION: 95 % | SYSTOLIC BLOOD PRESSURE: 134 MMHG | TEMPERATURE: 97.6 F | RESPIRATION RATE: 16 BRPM | BODY MASS INDEX: 30.75 KG/M2

## 2018-12-12 DIAGNOSIS — D59.10 AUTOIMMUNE HEMOLYTIC ANEMIA (HCC): ICD-10-CM

## 2018-12-12 DIAGNOSIS — D69.6 THROMBOCYTOPENIA (HCC): ICD-10-CM

## 2018-12-12 DIAGNOSIS — C91.10 CLL (CHRONIC LYMPHOCYTIC LEUKEMIA) (HCC): Primary | ICD-10-CM

## 2018-12-12 DIAGNOSIS — D75.82 HIT (HEPARIN-INDUCED THROMBOCYTOPENIA) (HCC): ICD-10-CM

## 2018-12-12 PROCEDURE — 99214 OFFICE O/P EST MOD 30 MIN: CPT | Performed by: PHYSICIAN ASSISTANT

## 2018-12-12 NOTE — PROGRESS NOTES
Hematology/Oncology Outpatient Follow-up  Wayne Bonilla 58 y o  male 1955 [id number]    Date:  12/12/2018      Assessment and Plan:  1  CLL (chronic lymphocytic leukemia) ([facility] )  58year old male with history of CLL  He continues to do well with Gazayva 1000 mg every 4 weeks, Ibrutinib 140 mg PO daily       He has been doing well with this regimen  Hemoglobin remains normal  Platelet count is stable  He has been having fluctuations in his platelet count  He had to hold ibrutinib for 3 days this past month  2  Thrombocytopenia ([facility] )  Secondary to chemotherapy     3  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)  No heparin  Patient is aware  4  Autoimmune hemolytic anemia (HCC)  Continues on 5 mg PO daily  HPI:  On 3/20/17 patient found to have hemoglobin of 6 2, ,000  He was admitted to [place] [address] for blood transfusion and plan to transfer to [address] [place] [address]  On 3/20/17 WBC count 195,000, hemoglobin 4 9, platelet count 65  Direct coomb's was positive with undetectable haptoglobin  He was given 2 units of PRBCs, Solu-Medrol 1 g ×3 days and IVIG 1 g/kg daily ×3 days  His hemoglobin continued to drop  Bone marrow biopsy on 3/21 showed marrow cellularity of greater than 95% almost replaced by small lymphocytes, lambda light chain restricted, CD20 positive, CD19 positive, CD23 positive partially expressing CD11c and CD25 and CD5 positive and negative for CD 79 and CD38  He was treated with single dose of chlorambucil to control his CLL   3/22 second dose of chlorambucil, also Rituxan 375 mg/M ² autoimmune hemolytic anemia  WBC continued to rise, 266,000  Patient initiated with Venetoclax 20 mg daily  Tumor lysis monitored every 8 hours; given aggressive hydration and Lasix and remained euvolemic  3/24-WBC dropped to 112,000  3/25- WBC 63,000  3/26-WBC 15,000, , platelet count 63,029  Patient became febrile, 101 3   The cefepime initiated Venetoclax held   3/28-patient fell from bed, CT head negative  ANC 1200, cefepime discontinued and Levaquin 75 mg daily started sliding 3/29 given second dose of rituximab 375 mg/m ²   3/30-WBC meg to 14 5 thousand, platelets 60,761, Venetoclax restarted 10 mg every other day  3/31 WBC 4 4, , platelet count 46,768  4/1-4/4: WBC maintained less than 10,000, ANC and platelet count mge  He was discharged on Venetoclax 10 mg every other day  He was instructed to discontinue simvastatin, Ranexa, aspirin, metoprolol 50 mg q  day, losartan 50 mg q  day, Plavix 75 mg q  day, folic acid 719 µg b i d , prednisone 60 mg, allopurinol  He was advised to continue Levaquin 750 mg daily for 10 days, Lexapro 10 mg daily, fenofibrate 50 mg daily, gabapentin 100 mg twice daily, Protonix 40 mg daily     Imaging studies performed at [facility]:  Patient had echocardiogram while inpatient at [facility] on 3/21  This study was unremarkable  CT chest abdomen pelvis without contrast on 3/24 showed stable pulmonary nodules, patchy groundglass densities of lower lobes previously identified and which may represent volume loss or early infiltrate  Persistent diffuse bronchial wall thickening suggesting acute/chronic bronchitis changes  No bronchiectasis  No masses  Stable lymphadenopathy of mediastinum  Stable axillary lymphadenopathy  Splenomegaly 23 9 cm  Extensive lymphadenopathy throughout the entire retroperitoneal, small bowel mesentery, and pelvis  Largest lymph node in right lower quadrant measured 4 6 x 4 8  Stable enlarged left para-aortic lymph node measuring 6 2 x 6 8   4/12/17: Patient received one dose of Rituxan on 4/7/17  Venetoclax 10 mg every other day 4/5/17 - 4/9/17  Venetoclax 10 mg every day beginning 4/10/17  Monitoring CBC 3 times per week  4/28/17: patient had follow-up appointment at [facility] with Dr Shola Cisneros  She recommended to slowly increase dose of veneteclax   Also, she recommended as he has had numerous treatments with Rituxan, if antibody directed therapy becomes indicated in the future recommendation was with [facility]  She will be seeing her on a p r n  Basis      July 2017- Hemolysis  Disease not under control with veneteclax  Started prednisone 60 mg daily, folic acid, IBRUTINIB 955 mg daily and Gazyva       Sept 2017- 9/18-9/20 patient was admitted due to uncontrolled hyperglycemia with new onset DM type II due to chronic prednisone use for CLL/hemolytic anemia; also found to have profound neutropenia  Ibrutinib dose was reduced to 280 mg daily      October 2017- 10/7/17 - 10/14/17 patient was admitted due to cellulitis on his scalp     Dec 2017- Hazel Ripa decreased to 140 mg PO daily due to thrombocytopenia      4/23 - 4/27 patient was admitted due to listeria bacteremia  He was discharged on IV ampicillin  Echo negative for vegetations  Imbruvica and [facility] was on hold at that time  Repeat CBC on 5/14/18 showed normal ANC, and hemoglobin  Platelets adequate at 83K      10/18/18 the patient EGD  This showed distal esophageal stricture  This was dilated to 18 mm using a balloon dilator  He could not dilate any further due to bleeding secondary to thrombocytopenia  Interval history: no complaints     ROS: Review of Systems   Constitutional: Negative for appetite change, chills, fatigue, fever and unexpected weight change  Respiratory: Negative for cough and shortness of breath  Cardiovascular: Negative for chest pain, palpitations and leg swelling  Gastrointestinal: Negative for abdominal pain, blood in stool, constipation, diarrhea, nausea and vomiting  Genitourinary: Negative for difficulty urinating, dysuria and hematuria  Musculoskeletal: Negative for arthralgias, joint swelling and myalgias  Skin: Negative  Neurological: Negative for dizziness, weakness, light-headedness, numbness and headaches  Hematological: Bruises/bleeds easily  Psychiatric/Behavioral: Negative  Past Medical History:   Diagnosis Date    Arthritis     CAD (coronary artery disease) 2004    Cellulitis of scalp     CKD (chronic kidney disease) stage 3, GFR 30-59 ml/min ([facility]     CLL (chronic lymphocytic leukemia) (HCC)     COPD (chronic obstructive pulmonary disease) ([facility] )     Diabetes mellitus ([facility] )     Dyslipidemia     H/O blood clots     Hemolytic anemia (HCC)     History of TIA (transient ischemic attack)     Hyperlipidemia     Hypertension     Multiple gastric ulcers     Myocardial infarction ([place] [address] ) 2004    acute    Neutropenia (HCC)     Obese     Recurrent sinusitis     Sleep apnea     Thrombocytopenia (HCC)     Vertigo        Past Surgical History:   Procedure Laterality Date    ARTHROSCOPIC REPAIR ACL      lt knee    CARDIAC SURGERY      cardiac cath with stent    FOOT SURGERY      HAND SURGERY      KNEE ARTHROSCOPY      OTHER SURGICAL HISTORY      cardiac cath lesion 1, 1st adjunct treat device: stent    PORTACATH PLACEMENT      [place] ESOPHAGOGASTRODUODENOSCOPY TRANSORAL DIAGNOSTIC N/A 10/5/2017    Procedure: ESOPHAGOGASTRODUODENOSCOPY (EGD) with bx;  Surgeon: Maura Valdez DO;  Location: [facility]; Service: Gastroenterology    [place] ESOPHAGOGASTRODUODENOSCOPY TRANSORAL DIAGNOSTIC N/A 3/8/2018    Procedure: ESOPHAGOGASTRODUODENOSCOPY (EGD) with biopsy;  Surgeon: Maura Valdez DO;  [facility]: [facility]; Service: Gastroenterology    [place] ESOPHAGOGASTRODUODENOSCOPY TRANSORAL DIAGNOSTIC N/A 6/20/2018    Procedure: ESOPHAGOGASTRODUODENOSCOPY (EGD) with Dilation;  Surgeon: Maura Valdez DO;  [facility]: [facility]; Service: Gastroenterology    [place] ESOPHAGOGASTRODUODENOSCOPY TRANSORAL DIAGNOSTIC N/A 10/18/2018    Procedure: ESOPHAGOGASTRODUODENOSCOPY (EGD) with dilation;  Surgeon: Mauro Treviño MD;  Location: [facility];   Service: Gastroenterology    [place] ESOPHAGOSCOPY FLEXIBLE TRANSORAL WITH BIOPSY N/A 9/2/2016    Procedure: ESOPHAGOGASTRODUODENOSCOPY (EGD); ESOPHAGEAL DILATION; ESOPHAGEAL BIOPSY;  Surgeon: Larry Graves MD;  [facility]: [facility];  Service: Thoracic    TONSILLECTOMY      UPPER GASTROINTESTINAL ENDOSCOPY      x 6       Social History     Social History    Marital status: /Civil Union     Spouse name: N/A    Number of children: N/A    Years of education: N/A     Social History Main Topics    Smoking status: Former Smoker    Smokeless tobacco: Never Used      Comment: pt refuses to answer question; current every day smoker as per  [employer]    Alcohol use No      Comment: social use as per [employer]    Drug use: No    Sexual activity: Not on file     Other Topics Concern    Not on file     Social History Narrative    No narrative on file       Family History   Problem Relation Age of Onset    Leukemia Mother         chronic    Colon polyps Mother         sidmoid    No Known Problems Father        Allergies   Allergen Reactions    Heparin Other (See Comments)     Other reaction(s): Blood Disorders  clot    Macrolides And Ketolides Other (See Comments)     Interacts with other meds he is taking         Current Outpatient Prescriptions:     acyclovir (ZOVIRAX) 400 MG tablet, Take 1 tablet (400 mg total) by mouth 2 (two) times a day for 180 days, Disp: 60 tablet, Rfl: 5    aspirin 81 MG tablet, Take 81 mg by mouth every other day Every other day , Disp: , Rfl:     citalopram (CeleXA) 40 mg tablet, Take 40 mg by mouth daily, Disp: , Rfl:     fenofibrate (TRIGLIDE) 50 MG tablet, Take 134 mg by mouth daily  , Disp: , Rfl:     folic acid (FOLVITE) 1 mg tablet, Take 1 mg by mouth daily, Disp: , Rfl:     furosemide (LASIX) 40 mg tablet, furosemide 40 mg tablet, Disp: , Rfl:     gabapentin (NEURONTIN) 600 MG tablet, Take 600 mg by mouth daily  , Disp: , Rfl:     glucose monitoring kit (FREESTYLE) monitoring kit, 1 each by Does not apply route as needed ( ) E 11 9, Disp: 1 each, Rfl: 0    insulin lispro (HumaLOG) 100 units/mL injection, Inject 10 Units under the skin 3 (three) times a day before meals Given with chemo infusion monthly, Disp: 4 mL, Rfl: 6    Insulin Syringe-Needle U-100 30G X 1/2" 0 3 ML MISC, by Does not apply route 2 (two) times a day, Disp: 100 each, Rfl: 6    Lancets (FREESTYLE) lancets, Use as instructed--test 2 times a day  E 11 9, Disp: 100 each, Rfl: 0    multivitamin (THERAGRAN) TABS, Take 1 tablet by mouth daily, Disp: , Rfl:     obinutuzumab (GAZYVA) 1000 mg/40 mL SOLN, Infuse into a venous catheter, Disp: , Rfl:     oxyCODONE (ROXICODONE) 10 MG TABS, Take 1 tablet (10 mg total) by mouth every 6 (six) hours as needed for moderate pain For ongoing pain control Max Daily Amount: 40 mg, Disp: 90 tablet, Rfl: 0    predniSONE 10 mg tablet, Take 1 tablet (10 mg total) by mouth daily (Patient taking differently: Take 5 mg by mouth daily  ), Disp: 30 tablet, Rfl: 2    sodium chloride, PF, 0 9 %, 10 mL by Intracatheter route see administration instructions 10mL into port before and after ampicillin doses, Disp: , Rfl: 0    VENTOLIN  (90 Base) MCG/ACT inhaler, INHALE 2 PUFFS 4 TIMES A DAY AS NEEDED, Disp: , Rfl: 3    Ibrutinib 140 MG TABS, Take 140 mg by mouth daily for 28 days, Disp: 30 tablet, Rfl: 5    pantoprazole (PROTONIX) 40 mg tablet, Take 1 tablet (40 mg total) by mouth 2 (two) times a day for 30 days, Disp: 60 tablet, Rfl: 6      Physical Exam:  /86   Pulse 71   Temp 97 6 °F (36 4 °C) (Tympanic)   Resp 16   Ht 6' 0 05" (1 83 m)   Wt 103 kg (227 lb)   SpO2 95%   BMI 30 74 kg/m²     Physical Exam   Constitutional: He is oriented to person, place, and time  He appears well-developed and well-nourished  No distress  HENT:   Head: Normocephalic and atraumatic  Eyes: Conjunctivae are normal  No scleral icterus  Neck: Normal range of motion  Neck supple  Cardiovascular: Normal rate, regular rhythm and normal heart sounds  No murmur heard    Pulmonary/Chest: Effort normal and breath sounds normal  No respiratory distress  Abdominal: Soft  There is no tenderness  Musculoskeletal: Normal range of motion  He exhibits no edema or tenderness  Neurological: He is alert and oriented to person, place, and time  No cranial nerve deficit  Skin: Skin is warm and dry  Psychiatric: He has a normal mood and affect  Vitals reviewed  Labs:  Lab Results   Component Value Date    WBC 4 50 12/10/2018    HGB 14 3 12/10/2018    HCT 43 8 12/10/2018    MCV 91 12/10/2018    PLT 41 (LL) 12/10/2018         Patient voiced understanding and agreement in the above discussion  Aware to contact our office with questions/symptoms in the interim

## 2018-12-14 DIAGNOSIS — C91.10 CLL (CHRONIC LYMPHOCYTIC LEUKEMIA) (HCC): ICD-10-CM

## 2018-12-15 RX ORDER — PREDNISONE 1 MG/1
TABLET ORAL
Qty: 30 TABLET | Refills: 4 | Status: SHIPPED | OUTPATIENT
Start: 2018-12-15 | End: 2019-04-19 | Stop reason: DRUGHIGH

## 2018-12-21 ENCOUNTER — TELEPHONE (OUTPATIENT)
Dept: HEMATOLOGY ONCOLOGY | Facility: CLINIC | Age: 63
End: 2018-12-21

## 2018-12-21 NOTE — TELEPHONE ENCOUNTER
Pt would like to have his bloodwork appointments to be changed to the same day as his chemo   Per the patient, he states this is okay per Dr Patience Mariee    He can be reached at [phone]

## 2018-12-31 ENCOUNTER — HOSPITAL ENCOUNTER (OUTPATIENT)
Dept: INFUSION CENTER | Facility: CLINIC | Age: 63
Discharge: HOME | End: 2018-12-31
Payer: MEDICARE

## 2018-12-31 DIAGNOSIS — C91.10 CLL (CHRONIC LYMPHOCYTIC LEUKEMIA) (HCC): ICD-10-CM

## 2018-12-31 LAB
ALBUMIN SERPL BCP-MCNC: 3.8 G/DL (ref 3.5–5.7)
ALP SERPL-CCNC: 49 U/L (ref 46–116)
ALT SERPL W P-5'-P-CCNC: 44 U/L (ref 12–78)
ANION GAP SERPL CALCULATED.3IONS-SCNC: 8 MMOL/L (ref 4–13)
AST SERPL W P-5'-P-CCNC: 31 U/L (ref 5–45)
BASOPHILS # BLD MANUAL: 0.05 THOUSAND/UL (ref 0–0.1)
BASOPHILS NFR MAR MANUAL: 1 % (ref 0–1)
BILIRUB SERPL-MCNC: 0.7 MG/DL (ref 0.2–1)
BUN SERPL-MCNC: 19 MG/DL (ref 5–25)
CALCIUM SERPL-MCNC: 9.5 MG/DL (ref 8.3–10.1)
CHLORIDE SERPL-SCNC: 107 MMOL/L (ref 98–108)
CO2 SERPL-SCNC: 29 MMOL/L (ref 21–32)
CREAT SERPL-MCNC: 1.3 MG/DL (ref 0.6–1.3)
EOSINOPHIL # BLD MANUAL: 0.09 THOUSAND/UL (ref 0–0.4)
EOSINOPHIL NFR BLD MANUAL: 2 % (ref 0–6)
ERYTHROCYTE [DISTWIDTH] IN BLOOD BY AUTOMATED COUNT: 14.4 % (ref 11.6–15.1)
GFR SERPL CREATININE-BSD FRML MDRD: 58 ML/MIN/1.73SQ M
GLUCOSE P FAST SERPL-MCNC: 132 MG/DL (ref 65–99)
GLUCOSE SERPL-MCNC: 132 MG/DL (ref 65–140)
HCT VFR BLD AUTO: 41 % (ref 36.5–49.3)
HGB BLD-MCNC: 13.8 G/DL (ref 12–17)
IGG SERPL-MCNC: 51 MG/DL (ref 700–1600)
LDH SERPL-CCNC: 236 U/L (ref 81–234)
LG PLATELETS BLD QL SMEAR: PRESENT
LYMPHOCYTES # BLD AUTO: 0.33 THOUSAND/UL (ref 0.6–4.47)
LYMPHOCYTES # BLD AUTO: 7 % (ref 14–44)
MCH RBC QN AUTO: 29.9 PG (ref 26.8–34.3)
MCHC RBC AUTO-ENTMCNC: 33.7 G/DL (ref 31.4–37.4)
MCV RBC AUTO: 89 FL (ref 82–98)
MONOCYTES # BLD AUTO: 0.57 THOUSAND/UL (ref 0–1.22)
MONOCYTES NFR BLD: 12 % (ref 4–12)
NEUTROPHILS # BLD MANUAL: 3.67 THOUSAND/UL (ref 1.85–7.62)
NEUTS BAND NFR BLD MANUAL: 9 % (ref 0–8)
NEUTS SEG NFR BLD AUTO: 69 % (ref 43–75)
PLATELET # BLD AUTO: 50 THOUSANDS/UL (ref 149–390)
PLATELET BLD QL SMEAR: ABNORMAL
PMV BLD AUTO: 13 FL (ref 8.9–12.7)
POTASSIUM SERPL-SCNC: 3.9 MMOL/L (ref 3.5–5.3)
PROT SERPL-MCNC: 5.8 G/DL (ref 6.4–8.2)
RBC # BLD AUTO: 4.62 MILLION/UL (ref 3.88–5.62)
RBC MORPH BLD: NORMAL
RETICS # AUTO: NORMAL 10*3/UL (ref 14356–105094)
RETICS # CALC: 1.76 % (ref 0.37–1.87)
SODIUM SERPL-SCNC: 144 MMOL/L (ref 136–145)
TOTAL CELLS COUNTED SPEC: 100
URATE SERPL-MCNC: 5 MG/DL (ref 4.2–8)
WBC # BLD AUTO: 4.71 THOUSAND/UL (ref 4.31–10.16)

## 2018-12-31 PROCEDURE — 85027 COMPLETE CBC AUTOMATED: CPT

## 2018-12-31 PROCEDURE — 85045 AUTOMATED RETICULOCYTE COUNT: CPT

## 2018-12-31 PROCEDURE — 82784 ASSAY IGA/IGD/IGG/IGM EACH: CPT

## 2018-12-31 PROCEDURE — 85007 BL SMEAR W/DIFF WBC COUNT: CPT

## 2018-12-31 PROCEDURE — 84550 ASSAY OF BLOOD/URIC ACID: CPT

## 2018-12-31 PROCEDURE — 83615 LACTATE (LD) (LDH) ENZYME: CPT

## 2018-12-31 PROCEDURE — 80053 COMPREHEN METABOLIC PANEL: CPT

## 2018-12-31 RX ORDER — SODIUM CHLORIDE 9 MG/ML
20 INJECTION, SOLUTION INTRAVENOUS CONTINUOUS
Status: DISCONTINUED | OUTPATIENT
Start: 2019-01-02 | End: 2019-01-05 | Stop reason: HOSPADM

## 2018-12-31 RX ORDER — ACETAMINOPHEN 325 MG/1
650 TABLET ORAL ONCE
Status: COMPLETED | OUTPATIENT
Start: 2019-01-02 | End: 2019-01-02

## 2018-12-31 NOTE — PLAN OF CARE

## 2019-01-02 ENCOUNTER — HOSPITAL ENCOUNTER (OUTPATIENT)
Dept: INFUSION CENTER | Facility: CLINIC | Age: 64
Discharge: HOME | End: 2019-01-02
Payer: MEDICARE

## 2019-01-02 VITALS
TEMPERATURE: 97.1 F | BODY MASS INDEX: 31.16 KG/M2 | DIASTOLIC BLOOD PRESSURE: 81 MMHG | HEART RATE: 84 BPM | WEIGHT: 230.05 LBS | SYSTOLIC BLOOD PRESSURE: 165 MMHG | RESPIRATION RATE: 18 BRPM

## 2019-01-02 PROCEDURE — 96415 CHEMO IV INFUSION ADDL HR: CPT

## 2019-01-02 PROCEDURE — 96367 TX/PROPH/DG ADDL SEQ IV INF: CPT

## 2019-01-02 PROCEDURE — 96413 CHEMO IV INFUSION 1 HR: CPT

## 2019-01-02 RX ADMIN — ACETAMINOPHEN 650 MG: 325 TABLET, FILM COATED ORAL at 08:43

## 2019-01-02 RX ADMIN — DIPHENHYDRAMINE HYDROCHLORIDE 25 MG: 50 INJECTION, SOLUTION INTRAMUSCULAR; INTRAVENOUS at 08:55

## 2019-01-02 RX ADMIN — OBINUTUZUMAB 1000 MG: 1000 INJECTION, SOLUTION, CONCENTRATE INTRAVENOUS at 09:33

## 2019-01-02 RX ADMIN — SODIUM CHLORIDE 20 ML/HR: 0.9 INJECTION, SOLUTION INTRAVENOUS at 08:34

## 2019-01-02 RX ADMIN — DEXAMETHASONE SODIUM PHOSPHATE 20 MG: 10 INJECTION, SOLUTION INTRAMUSCULAR; INTRAVENOUS at 08:34

## 2019-01-02 NOTE — PLAN OF CARE
Problem: INFECTION - ADULT  Goal: Absence or prevention of progression during hospitalization  INTERVENTIONS:  - Assess and monitor for signs and symptoms of infection  - Monitor lab/diagnostic results  - Monitor all insertion sites, i e  indwelling lines, tubes, and drains  - Monitor endotracheal (as able) and nasal secretions for changes in amount and color  - [place] appropriate cooling/warming therapies per order  - Administer medications as ordered  - Instruct and encourage patient and family to use good hand hygiene technique  - Identify and instruct in appropriate isolation precautions for identified infection/condition  Outcome: Progressing    Goal: Absence of fever/infection during neutropenic period  INTERVENTIONS:  - Monitor WBC  - Implement neutropenic guidelines  Outcome: Progressing

## 2019-01-04 ENCOUNTER — TELEPHONE (OUTPATIENT)
Dept: HEMATOLOGY ONCOLOGY | Facility: CLINIC | Age: 64
End: 2019-01-04

## 2019-01-04 DIAGNOSIS — J06.9 UPPER RESPIRATORY TRACT INFECTION, UNSPECIFIED TYPE: ICD-10-CM

## 2019-01-04 DIAGNOSIS — R05.9 COUGH: Primary | ICD-10-CM

## 2019-01-04 RX ORDER — LEVOFLOXACIN 500 MG/1
500 TABLET, FILM COATED ORAL EVERY 24 HOURS
Qty: 7 TABLET | Refills: 0 | Status: SHIPPED | OUTPATIENT
Start: 2019-01-04 | End: 2022-01-31 | Stop reason: SDUPTHER

## 2019-01-04 NOTE — TELEPHONE ENCOUNTER
Spoke with patient regarding pulmonary symptoms  He has had cough and SOB for the past 1-2 weeks  Denies fever/chills  Denies chest pain  He will call with worsening symptoms or report to hospital if needed  Levaquin 500 mg PO daily x 7 days called into the pharmacy

## 2019-01-04 NOTE — TELEPHONE ENCOUNTER
Patient calling stating he needs Dr Yue Gilbert or Ashley Dwyer to call him back TODAY regarding calling in an Antibiotic  States he will not be seeing his PCP or Urgent care as he's a cancer patient and just needs an antibiotic  He states he has had "a cold for 2 weeks", and now he is coughing up phlegm  Pt has increased SOB, and has a history of COPD  He reports his breathing is shallow, and he gets more SOB with any exertion or work  He will NOT seek care at this time, and "knows he needs an antibiotic since he's been doing this for 20 years"    Message sent to Kira Jay for assistance

## 2019-01-13 DIAGNOSIS — F41.9 ANXIETY: Primary | ICD-10-CM

## 2019-01-14 RX ORDER — CITALOPRAM 40 MG/1
TABLET ORAL
Qty: 90 TABLET | Refills: 3 | Status: SHIPPED | OUTPATIENT
Start: 2019-01-14 | End: 2019-02-28 | Stop reason: ALTCHOICE

## 2019-01-18 DIAGNOSIS — G62.9 NEUROPATHY: Primary | ICD-10-CM

## 2019-01-18 NOTE — TELEPHONE ENCOUNTER
Patient called and requested a medication refill for gabapentin (NEURONTIN) 600 MG tablet-Take 600 mg by mouth daily   He would like this called into [facility] [phone]

## 2019-01-21 RX ORDER — GABAPENTIN 600 MG/1
600 TABLET ORAL DAILY
Qty: 30 TABLET | Refills: 2 | Status: SHIPPED | OUTPATIENT
Start: 2019-01-21 | End: 2019-05-24 | Stop reason: SDUPTHER

## 2019-01-28 RX ORDER — ACETAMINOPHEN 325 MG/1
650 TABLET ORAL ONCE
Status: COMPLETED | OUTPATIENT
Start: 2019-01-29 | End: 2019-01-29

## 2019-01-28 RX ORDER — SODIUM CHLORIDE 9 MG/ML
20 INJECTION, SOLUTION INTRAVENOUS CONTINUOUS
Status: DISCONTINUED | OUTPATIENT
Start: 2019-01-29 | End: 2019-02-01 | Stop reason: HOSPADM

## 2019-01-29 ENCOUNTER — HOSPITAL ENCOUNTER (OUTPATIENT)
Dept: INFUSION CENTER | Facility: CLINIC | Age: 64
Discharge: HOME | End: 2019-01-29
Payer: MEDICARE

## 2019-01-29 ENCOUNTER — APPOINTMENT (OUTPATIENT)
Dept: LAB | Facility: CLINIC | Age: 64
End: 2019-01-29
Payer: MEDICARE

## 2019-01-29 VITALS
RESPIRATION RATE: 18 BRPM | DIASTOLIC BLOOD PRESSURE: 80 MMHG | SYSTOLIC BLOOD PRESSURE: 157 MMHG | HEART RATE: 80 BPM | TEMPERATURE: 97 F

## 2019-01-29 DIAGNOSIS — T38.0X5A STEROID-INDUCED DIABETES (HCC): ICD-10-CM

## 2019-01-29 DIAGNOSIS — E78.01 FAMILIAL HYPERCHOLESTEROLEMIA: ICD-10-CM

## 2019-01-29 DIAGNOSIS — E09.9 STEROID-INDUCED DIABETES (HCC): ICD-10-CM

## 2019-01-29 DIAGNOSIS — C91.10 CLL (CHRONIC LYMPHOCYTIC LEUKEMIA) (HCC): ICD-10-CM

## 2019-01-29 LAB
ALBUMIN SERPL BCP-MCNC: 4.2 G/DL (ref 3.5–5.7)
ALP SERPL-CCNC: 54 U/L (ref 46–116)
ALT SERPL W P-5'-P-CCNC: 43 U/L (ref 12–78)
ANION GAP SERPL CALCULATED.3IONS-SCNC: 5 MMOL/L (ref 4–13)
AST SERPL W P-5'-P-CCNC: 36 U/L (ref 5–45)
BASOPHILS # BLD MANUAL: 0 THOUSAND/UL (ref 0–0.1)
BASOPHILS NFR MAR MANUAL: 0 % (ref 0–1)
BILIRUB SERPL-MCNC: 0.8 MG/DL (ref 0.2–1)
BUN SERPL-MCNC: 15 MG/DL (ref 5–25)
CALCIUM SERPL-MCNC: 9.7 MG/DL (ref 8.3–10.1)
CHLORIDE SERPL-SCNC: 107 MMOL/L (ref 98–108)
CHOLEST SERPL-MCNC: 157 MG/DL (ref 50–200)
CO2 SERPL-SCNC: 30 MMOL/L (ref 21–32)
CREAT SERPL-MCNC: 1.4 MG/DL (ref 0.6–1.3)
CREAT UR-MCNC: 138 MG/DL
EOSINOPHIL # BLD MANUAL: 0.2 THOUSAND/UL (ref 0–0.4)
EOSINOPHIL NFR BLD MANUAL: 4 % (ref 0–6)
ERYTHROCYTE [DISTWIDTH] IN BLOOD BY AUTOMATED COUNT: 14.5 % (ref 11.6–15.1)
EST. AVERAGE GLUCOSE BLD GHB EST-MCNC: 146 MG/DL
GFR SERPL CREATININE-BSD FRML MDRD: 53 ML/MIN/1.73SQ M
GLUCOSE SERPL-MCNC: 142 MG/DL (ref 65–140)
HBA1C MFR BLD: 6.7 % (ref 4.2–6.3)
HCT VFR BLD AUTO: 41.2 % (ref 36.5–49.3)
HDLC SERPL-MCNC: 31 MG/DL (ref 40–60)
HGB BLD-MCNC: 13.9 G/DL (ref 12–17)
IGG SERPL-MCNC: 54 MG/DL (ref 700–1600)
LDH SERPL-CCNC: 236 U/L (ref 81–234)
LDLC SERPL CALC-MCNC: 99 MG/DL (ref 0–100)
LG PLATELETS BLD QL SMEAR: PRESENT
LYMPHOCYTES # BLD AUTO: 0.44 THOUSAND/UL (ref 0.6–4.47)
LYMPHOCYTES # BLD AUTO: 9 % (ref 14–44)
MCH RBC QN AUTO: 30.3 PG (ref 26.8–34.3)
MCHC RBC AUTO-ENTMCNC: 33.7 G/DL (ref 31.4–37.4)
MCV RBC AUTO: 90 FL (ref 82–98)
MICROALBUMIN UR-MCNC: 16.9 MG/L (ref 0–20)
MICROALBUMIN/CREAT 24H UR: 12 MG/G CREATININE (ref 0–30)
MONOCYTES # BLD AUTO: 0.49 THOUSAND/UL (ref 0–1.22)
MONOCYTES NFR BLD: 10 % (ref 4–12)
NEUTROPHILS # BLD MANUAL: 3.77 THOUSAND/UL (ref 1.85–7.62)
NEUTS BAND NFR BLD MANUAL: 2 % (ref 0–8)
NEUTS SEG NFR BLD AUTO: 75 % (ref 43–75)
OVALOCYTES BLD QL SMEAR: PRESENT
PLATELET # BLD AUTO: 57 THOUSANDS/UL (ref 149–390)
PLATELET BLD QL SMEAR: ABNORMAL
PMV BLD AUTO: 12.9 FL (ref 8.9–12.7)
POTASSIUM SERPL-SCNC: 4.2 MMOL/L (ref 3.5–5.3)
PROT SERPL-MCNC: 6 G/DL (ref 6.4–8.2)
RBC # BLD AUTO: 4.59 MILLION/UL (ref 3.88–5.62)
RETICS # AUTO: ABNORMAL 10*3/UL (ref 14356–105094)
RETICS # CALC: 2.14 % (ref 0.37–1.87)
SODIUM SERPL-SCNC: 142 MMOL/L (ref 136–145)
T4 FREE SERPL-MCNC: 1.04 NG/DL (ref 0.76–1.46)
TOTAL CELLS COUNTED SPEC: 100
TRIGL SERPL-MCNC: 135 MG/DL
TSH SERPL DL<=0.05 MIU/L-ACNC: 1.88 UIU/ML (ref 0.36–3.74)
URATE SERPL-MCNC: 5.7 MG/DL (ref 4.2–8)
WBC # BLD AUTO: 4.9 THOUSAND/UL (ref 4.31–10.16)

## 2019-01-29 PROCEDURE — 80053 COMPREHEN METABOLIC PANEL: CPT

## 2019-01-29 PROCEDURE — 84550 ASSAY OF BLOOD/URIC ACID: CPT

## 2019-01-29 PROCEDURE — 36415 COLL VENOUS BLD VENIPUNCTURE: CPT

## 2019-01-29 PROCEDURE — 84439 ASSAY OF FREE THYROXINE: CPT

## 2019-01-29 PROCEDURE — 83036 HEMOGLOBIN GLYCOSYLATED A1C: CPT

## 2019-01-29 PROCEDURE — 96415 CHEMO IV INFUSION ADDL HR: CPT

## 2019-01-29 PROCEDURE — 85045 AUTOMATED RETICULOCYTE COUNT: CPT

## 2019-01-29 PROCEDURE — 85007 BL SMEAR W/DIFF WBC COUNT: CPT

## 2019-01-29 PROCEDURE — 83615 LACTATE (LD) (LDH) ENZYME: CPT

## 2019-01-29 PROCEDURE — 82784 ASSAY IGA/IGD/IGG/IGM EACH: CPT

## 2019-01-29 PROCEDURE — 85027 COMPLETE CBC AUTOMATED: CPT

## 2019-01-29 PROCEDURE — 96413 CHEMO IV INFUSION 1 HR: CPT

## 2019-01-29 PROCEDURE — 80061 LIPID PANEL: CPT

## 2019-01-29 PROCEDURE — 82570 ASSAY OF URINE CREATININE: CPT

## 2019-01-29 PROCEDURE — 96367 TX/PROPH/DG ADDL SEQ IV INF: CPT

## 2019-01-29 PROCEDURE — 82043 UR ALBUMIN QUANTITATIVE: CPT

## 2019-01-29 PROCEDURE — 84443 ASSAY THYROID STIM HORMONE: CPT

## 2019-01-29 RX ADMIN — DIPHENHYDRAMINE HYDROCHLORIDE 25 MG: 50 INJECTION, SOLUTION INTRAMUSCULAR; INTRAVENOUS at 09:47

## 2019-01-29 RX ADMIN — SODIUM CHLORIDE 20 ML/HR: 0.9 INJECTION, SOLUTION INTRAVENOUS at 09:25

## 2019-01-29 RX ADMIN — ACETAMINOPHEN 650 MG: 325 TABLET, FILM COATED ORAL at 09:29

## 2019-01-29 RX ADMIN — DEXAMETHASONE SODIUM PHOSPHATE 20 MG: 10 INJECTION, SOLUTION INTRAMUSCULAR; INTRAVENOUS at 09:25

## 2019-01-29 RX ADMIN — OBINUTUZUMAB 1000 MG: 1000 INJECTION, SOLUTION, CONCENTRATE INTRAVENOUS at 10:11

## 2019-01-29 NOTE — PROGRESS NOTES
Patient reported that his cough has resolved and denies any other complications  Labs are within parameters   Chelsea Sports is infusing

## 2019-01-30 ENCOUNTER — TELEPHONE (OUTPATIENT)
Dept: ENDOCRINOLOGY | Facility: CLINIC | Age: 64
End: 2019-01-30

## 2019-01-30 NOTE — TELEPHONE ENCOUNTER
----- [facility] 1/30/2019 12:07 PM EST -----  Please call the patient regarding his abnormal result   Lab results normal

## 2019-02-13 ENCOUNTER — OFFICE VISIT (OUTPATIENT)
Dept: HEMATOLOGY ONCOLOGY | Facility: CLINIC | Age: 64
End: 2019-02-13
Payer: MEDICARE

## 2019-02-13 VITALS
SYSTOLIC BLOOD PRESSURE: 132 MMHG | TEMPERATURE: 97.8 F | OXYGEN SATURATION: 97 % | DIASTOLIC BLOOD PRESSURE: 72 MMHG | WEIGHT: 233 LBS | HEIGHT: 72 IN | BODY MASS INDEX: 31.56 KG/M2 | RESPIRATION RATE: 17 BRPM | HEART RATE: 83 BPM

## 2019-02-13 DIAGNOSIS — C91.10 CLL (CHRONIC LYMPHOCYTIC LEUKEMIA) (HCC): Primary | ICD-10-CM

## 2019-02-13 DIAGNOSIS — D75.82 HIT (HEPARIN-INDUCED THROMBOCYTOPENIA) (HCC): ICD-10-CM

## 2019-02-13 DIAGNOSIS — D59.19 OTHER AUTOIMMUNE HEMOLYTIC ANEMIAS: ICD-10-CM

## 2019-02-13 DIAGNOSIS — D69.6 THROMBOCYTOPENIA (HCC): ICD-10-CM

## 2019-02-13 PROCEDURE — 99214 OFFICE O/P EST MOD 30 MIN: CPT | Performed by: PHYSICIAN ASSISTANT

## 2019-02-13 NOTE — PROGRESS NOTES
Hematology/Oncology Outpatient Follow-up  Lilo Parson 58 y o  male 1955 [id number]    Date:  2/13/2019      Assessment and Plan:    1  CLL (chronic lymphocytic leukemia) ([facility] )  58year old male with history of CLL  He continues to do well with Gazayva 1000 mg every 4 weeks, Ibrutinib 140 mg PO daily  He continues to have arthralgias secondary to ibrutinib  These are manageable however and he is still able to do activities  Of note he is currently replacing the cabinets and countertops in his home       Hemoglobin remains normal, 13 9  WBC count also normal     2  Other autoimmune hemolytic anemias (HCC)  Continues on prednisone 5 mg PO daily  3  Thrombocytopenia ([facility] )  This is secondary to his treatment  Platelet count remains stable  Continue to monitor  4  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)  No heparin  Patient is aware  This is on his medical ID bracelet as well  HPI:  On 3/20/17 patient found to have hemoglobin of 6 2, ,000  He was admitted to [place] [address] for blood transfusion and plan to transfer to [address] [place] [address]  On 3/20/17 WBC count 195,000, hemoglobin 4 9, platelet count 65  Direct coomb's was positive with undetectable haptoglobin  He was given 2 units of PRBCs, Solu-Medrol 1 g ×3 days and IVIG 1 g/kg daily ×3 days  His hemoglobin continued to drop  Bone marrow biopsy on 3/21 showed marrow cellularity of greater than 95% almost replaced by small lymphocytes, lambda light chain restricted, CD20 positive, CD19 positive, CD23 positive partially expressing CD11c and CD25 and CD5 positive and negative for CD 79 and CD38  He was treated with single dose of chlorambucil to control his CLL   3/22 second dose of chlorambucil, also Rituxan 375 mg/M ² autoimmune hemolytic anemia  WBC continued to rise, 266,000  Patient initiated with Venetoclax 20 mg daily   Tumor lysis monitored every 8 hours; given aggressive hydration and Lasix and remained euvolemic  3/24-WBC dropped to 112,000  3/25- WBC 63,000  3/26-WBC 15,000, , platelet count 13,082  Patient became febrile, 101 3  The cefepime initiated Venetoclax held  3/28-patient fell from bed, CT head negative  ANC 1200, cefepime discontinued and Levaquin 75 mg daily started sliding 3/29 given second dose of rituximab 375 mg/m ²   3/30-WBC meg to 14 5 thousand, platelets 85,727, Venetoclax restarted 10 mg every other day  3/31 WBC 4 4, , platelet count 62,897  4/1-4/4: WBC maintained less than 10,000, ANC and platelet count meg  He was discharged on Venetoclax 10 mg every other day  He was instructed to discontinue simvastatin, Ranexa, aspirin, metoprolol 50 mg q  day, losartan 50 mg q  day, Plavix 75 mg q  day, folic acid 906 µg b i d , prednisone 60 mg, allopurinol  He was advised to continue Levaquin 750 mg daily for 10 days, Lexapro 10 mg daily, fenofibrate 50 mg daily, gabapentin 100 mg twice daily, Protonix 40 mg daily     Imaging studies performed at [facility]:  Patient had echocardiogram while inpatient at [facility] on 3/21  This study was unremarkable  CT chest abdomen pelvis without contrast on 3/24 showed stable pulmonary nodules, patchy groundglass densities of lower lobes previously identified and which may represent volume loss or early infiltrate  Persistent diffuse bronchial wall thickening suggesting acute/chronic bronchitis changes  No bronchiectasis  No masses  Stable lymphadenopathy of mediastinum  Stable axillary lymphadenopathy  Splenomegaly 23 9 cm  Extensive lymphadenopathy throughout the entire retroperitoneal, small bowel mesentery, and pelvis  Largest lymph node in right lower quadrant measured 4 6 x 4 8  Stable enlarged left para-aortic lymph node measuring 6 2 x 6 8   4/12/17: Patient received one dose of Rituxan on 4/7/17  Venetoclax 10 mg every other day 4/5/17 - 4/9/17  Venetoclax 10 mg every day beginning 4/10/17  Monitoring CBC 3 times per week  4/28/17: patient had follow-up appointment at [facility] with Dr Vince Hameed  She recommended to slowly increase dose of veneteclax  Also, she recommended as he has had numerous treatments with Rituxan, if antibody directed therapy becomes indicated in the future recommendation was with [facility]  She will be seeing her on a p r n  Basis      July 2017- Hemolysis  Disease not under control with veneteclax  Started prednisone 60 mg daily, folic acid, IBRUTINIB 323 mg daily and Gazyva       Sept 2017- 9/18-9/20 patient was admitted due to uncontrolled hyperglycemia with new onset DM type II due to chronic prednisone use for CLL/hemolytic anemia; also found to have profound neutropenia  Ibrutinib dose was reduced to 280 mg daily      October 2017- 10/7/17 - 10/14/17 patient was admitted due to cellulitis on his scalp     Dec 2017- Valeriano Don decreased to 140 mg PO daily due to thrombocytopenia      4/23 - 4/27 patient was admitted due to listeria bacteremia  He was discharged on IV ampicillin  Echo negative for vegetations  Imbruvica and [facility] was on hold at that time  Repeat CBC on 5/14/18 showed normal ANC, and hemoglobin  Platelets adequate at 83K      10/18/18 the patient EGD   This showed distal esophageal stricture   This was dilated to 18 mm using a balloon dilator   He could not dilate any further due to bleeding secondary to thrombocytopenia  Interval history: continued arthralgias 2/2 ibrutinib     ROS: Review of Systems   Constitutional: Negative for appetite change, chills, fatigue, fever and unexpected weight change  HENT: Negative for mouth sores and nosebleeds  Respiratory: Negative for cough and shortness of breath  Cardiovascular: Negative for chest pain, palpitations and leg swelling  Gastrointestinal: Negative for abdominal pain, blood in stool, constipation, diarrhea, nausea and vomiting  Genitourinary: Negative for difficulty urinating, dysuria and hematuria  Musculoskeletal: Positive for arthralgias  Negative for joint swelling and myalgias  Skin: Negative  Neurological: Negative for dizziness, weakness, light-headedness, numbness and headaches  Hematological: Negative  Psychiatric/Behavioral: Negative  Past Medical History:   Diagnosis Date    Arthritis     CAD (coronary artery disease) 2004    Cellulitis of scalp     CKD (chronic kidney disease) stage 3, GFR 30-59 ml/min ([facility]     CLL (chronic lymphocytic leukemia) ([facility])     COPD (chronic obstructive pulmonary disease) ([facility] )     Diabetes mellitus ([facility] )     Dyslipidemia     H/O blood clots     Hemolytic anemia ([facility])     History of TIA (transient ischemic attack)     Hyperlipidemia     Hypertension     Multiple gastric ulcers     Myocardial infarction ([facility] ) 2004    acute    Neutropenia ([facility])     Obese     Recurrent sinusitis     Sleep apnea     Thrombocytopenia ([facility])     Vertigo        Past Surgical History:   Procedure Laterality Date    ARTHROSCOPIC REPAIR ACL      lt knee    CARDIAC SURGERY      cardiac cath with stent    FOOT SURGERY      HAND SURGERY      KNEE ARTHROSCOPY      OTHER SURGICAL HISTORY      cardiac cath lesion 1, 1st adjunct treat device: stent    PORTACATH PLACEMENT      VT ESOPHAGOGASTRODUODENOSCOPY TRANSORAL DIAGNOSTIC N/A 10/5/2017    Procedure: ESOPHAGOGASTRODUODENOSCOPY (EGD) with bx;  Surgeon: Akash Abdullahi DO;  Location: [facility]; Service: Gastroenterology    VT ESOPHAGOGASTRODUODENOSCOPY TRANSORAL DIAGNOSTIC N/A 3/8/2018    Procedure: ESOPHAGOGASTRODUODENOSCOPY (EGD) with biopsy;  Surgeon: Akash Abdullahi DO;  Location: [facility]; Service: Gastroenterology    VT ESOPHAGOGASTRODUODENOSCOPY TRANSORAL DIAGNOSTIC N/A 6/20/2018    Procedure: ESOPHAGOGASTRODUODENOSCOPY (EGD) with Dilation;  Surgeon: Akash Abdullahi DO;  Location:  GI LAB;   Service: Gastroenterology    VT ESOPHAGOGASTRODUODENOSCOPY TRANSORAL DIAGNOSTIC N/A 10/18/2018 Procedure: ESOPHAGOGASTRODUODENOSCOPY (EGD) with dilation;  Surgeon: Priscilla Serrano MD;  Location: [facility]; Service: Gastroenterology    OR ESOPHAGOSCOPY FLEXIBLE TRANSORAL WITH BIOPSY N/A 9/2/2016    Procedure: ESOPHAGOGASTRODUODENOSCOPY (EGD); ESOPHAGEAL DILATION; ESOPHAGEAL BIOPSY;  Surgeon: Buddy De La Garza MD;  Location:  MAIN OR;  Service: Thoracic    TONSILLECTOMY      UPPER GASTROINTESTINAL ENDOSCOPY      x 6       Social History     Socioeconomic History    Marital status: /Civil Union     Spouse name: Not on file    Number of children: Not on file    Years of education: Not on file    Highest education level: Not on file   Occupational History    Not on file   Social Needs    Financial resource strain: Not on file    Food insecurity:     Worry: Not on file     Inability: Not on file    Transportation needs:     Medical: Not on file     Non-medical: Not on file   Tobacco Use    Smoking status: Former Smoker    Smokeless tobacco: Never Used    Tobacco comment: pt refuses to answer question; current every day smoker as per  [employer]   Substance and Sexual Activity    Alcohol use:  Yes     Alcohol/week: 1 2 oz     Types: 2 Cans of beer per week     Comment: social use as per [employer]    Drug use: No    Sexual activity: Not on file   Lifestyle    Physical activity:     Days per week: Not on file     Minutes per session: Not on file    Stress: Not on file   Relationships    Social connections:     Talks on phone: Not on file     Gets together: Not on file     Attends Tenriism service: Not on file     Active member of club or organization: Not on file     Attends meetings of clubs or organizations: Not on file     Relationship status: Not on file    Intimate partner violence:     Fear of current or ex partner: Not on file     Emotionally abused: Not on file     Physically abused: Not on file     Forced sexual activity: Not on file   Other Topics Concern    Not on file Social History Narrative    Not on file       Family History   Problem Relation Age of Onset    Leukemia Mother         chronic    Colon polyps Mother         sidmoid    No Known Problems Father        Allergies   Allergen Reactions    Heparin Other (See Comments)     Other reaction(s): Blood Disorders  clot    Macrolides And Ketolides Other (See Comments)     Interacts with other meds he is taking         Current Outpatient Medications:     acyclovir (ZOVIRAX) 400 MG tablet, Take 1 tablet (400 mg total) by mouth 2 (two) times a day for 180 days, Disp: 60 tablet, Rfl: 5    aspirin 81 MG tablet, Take 81 mg by mouth every other day Every other day , Disp: , Rfl:     citalopram (CeleXA) 40 mg tablet, TAKE 1 TABLET DAILY, Disp: 90 tablet, Rfl: 3    fenofibrate (TRIGLIDE) 50 MG tablet, Take 134 mg by mouth daily  , Disp: , Rfl:     folic acid (FOLVITE) 1 mg tablet, Take 1 mg by mouth daily, Disp: , Rfl:     furosemide (LASIX) 40 mg tablet, furosemide 40 mg tablet, Disp: , Rfl:     gabapentin (NEURONTIN) 600 MG tablet, Take 1 tablet (600 mg total) by mouth daily, Disp: 30 tablet, Rfl: 2    glucose monitoring kit (FREESTYLE) monitoring kit, 1 each by Does not apply route as needed ( ) E 11 9, Disp: 1 each, Rfl: 0    insulin lispro (HumaLOG) 100 units/mL injection, Inject 10 Units under the skin 3 (three) times a day before meals Given with chemo infusion monthly, Disp: 4 mL, Rfl: 6    Insulin Syringe-Needle U-100 30G X 1/2" 0 3 ML MISC, by Does not apply route 2 (two) times a day, Disp: 100 each, Rfl: 6    Lancets (FREESTYLE) lancets, Use as instructed--test 2 times a day  E 11 9, Disp: 100 each, Rfl: 0    multivitamin (THERAGRAN) TABS, Take 1 tablet by mouth daily, Disp: , Rfl:     obinutuzumab (GAZYVA) 1000 mg/40 mL SOLN, Infuse into a venous catheter, Disp: , Rfl:     oxyCODONE (ROXICODONE) 10 MG TABS, Take 1 tablet (10 mg total) by mouth every 6 (six) hours as needed for moderate pain For ongoing pain control Max Daily Amount: 40 mg, Disp: 90 tablet, Rfl: 0    predniSONE 10 mg tablet, Take 1 tablet (10 mg total) by mouth daily (Patient taking differently: Take 5 mg by mouth daily  ), Disp: 30 tablet, Rfl: 2    predniSONE 5 mg tablet, TAKE 1 TO 2 TABLETS EVERY DAY AS DIRECTED WITH FOOD, Disp: 30 tablet, Rfl: 4    sodium chloride, PF, 0 9 %, 10 mL by Intracatheter route see administration instructions 10mL into port before and after ampicillin doses, Disp: , Rfl: 0    VENTOLIN  (90 Base) MCG/ACT inhaler, INHALE 2 PUFFS 4 TIMES A DAY AS NEEDED, Disp: , Rfl: 3    Ibrutinib 140 MG TABS, Take 140 mg by mouth daily for 28 days, Disp: 30 tablet, Rfl: 5    pantoprazole (PROTONIX) 40 mg tablet, Take 1 tablet (40 mg total) by mouth 2 (two) times a day for 30 days, Disp: 60 tablet, Rfl: 6      Physical Exam:  /72 (BP Location: Left arm, Patient Position: Sitting)   Pulse 83   Temp 97 8 °F (36 6 °C) (Tympanic)   Resp 17   Ht 6' (1 829 m)   Wt 106 kg (233 lb)   SpO2 97%   BMI 31 60 kg/m²     Physical Exam   Constitutional: He is oriented to person, place, and time  He appears well-developed and well-nourished  No distress  HENT:   Head: Normocephalic and atraumatic  Eyes: Conjunctivae are normal  No scleral icterus  Neck: Normal range of motion  Neck supple  Cardiovascular: Normal rate, regular rhythm and normal heart sounds  No murmur heard  Pulmonary/Chest: Effort normal and breath sounds normal  No respiratory distress  Abdominal: Soft  There is no tenderness  Musculoskeletal: Normal range of motion  He exhibits no edema or tenderness  Lymphadenopathy:     He has no cervical adenopathy  Right: No supraclavicular adenopathy present  Left: No supraclavicular adenopathy present  Neurological: He is alert and oriented to person, place, and time  No cranial nerve deficit  Skin: Skin is warm and dry  Psychiatric: He has a normal mood and affect     Vitals reviewed  Labs:  Lab Results   Component Value Date    WBC 4 90 01/29/2019    HGB 13 9 01/29/2019    HCT 41 2 01/29/2019    MCV 90 01/29/2019    PLT 57 (L) 01/29/2019     Lab Results   Component Value Date     12/29/2015    K 4 2 01/29/2019     01/29/2019    CO2 30 01/29/2019    ANIONGAP 8 12/29/2015    BUN 15 01/29/2019    CREATININE 1 40 (H) 01/29/2019    GLUCOSE 109 12/29/2015    GLUF 132 (H) 12/31/2018    CALCIUM 9 7 01/29/2019    AST 36 01/29/2019    ALT 43 01/29/2019    ALKPHOS 54 01/29/2019    PROT 5 9 (L) 12/29/2015    BILITOT 0 7 12/29/2015    EGFR 53 01/29/2019       Patient voiced understanding and agreement in the above discussion  Aware to contact our office with questions/symptoms in the interim

## 2019-02-22 ENCOUNTER — TELEPHONE (OUTPATIENT)
Dept: HEMATOLOGY ONCOLOGY | Facility: CLINIC | Age: 64
End: 2019-02-22

## 2019-02-22 NOTE — TELEPHONE ENCOUNTER
CALL FROM PT    HE IS UPSET WITH JOINT PAIN IN HANDS,WRIST,ELBOWS AND KNEES    CAN MOVE THUMB AND FINGERS DUE TO SWELLING   CAN YOU PLEASE GIVE HIM A CALL BACK

## 2019-02-22 NOTE — TELEPHONE ENCOUNTER
Spoke with patient  He has had chronic joint arthralgias secondary to side effect from [place]  He states for the past 3 days this has been significantly worse  He will hold Imburvica today and through the weekend  He will start 40 mg prednisone today for the next 3 days then 20 mg for 3 days and then 10 mg for 3 days  He will call the office on Monday to update us on his symptoms  He will go to ED if worsening  He has no other symptoms

## 2019-02-25 RX ORDER — SODIUM CHLORIDE 9 MG/ML
20 INJECTION, SOLUTION INTRAVENOUS CONTINUOUS
Status: DISCONTINUED | OUTPATIENT
Start: 2019-02-26 | End: 2019-03-01 | Stop reason: HOSPADM

## 2019-02-25 RX ORDER — ACETAMINOPHEN 325 MG/1
650 TABLET ORAL ONCE
Status: COMPLETED | OUTPATIENT
Start: 2019-02-26 | End: 2019-02-26

## 2019-02-26 ENCOUNTER — HOSPITAL ENCOUNTER (OUTPATIENT)
Dept: INFUSION CENTER | Facility: CLINIC | Age: 64
Discharge: HOME | End: 2019-02-26
Payer: MEDICARE

## 2019-02-26 VITALS
DIASTOLIC BLOOD PRESSURE: 80 MMHG | HEART RATE: 75 BPM | OXYGEN SATURATION: 99 % | SYSTOLIC BLOOD PRESSURE: 160 MMHG | TEMPERATURE: 98.2 F | RESPIRATION RATE: 20 BRPM

## 2019-02-26 DIAGNOSIS — T38.0X5A STEROID-INDUCED DIABETES (HCC): ICD-10-CM

## 2019-02-26 DIAGNOSIS — C91.10 CLL (CHRONIC LYMPHOCYTIC LEUKEMIA) (HCC): ICD-10-CM

## 2019-02-26 DIAGNOSIS — E09.9 STEROID-INDUCED DIABETES (HCC): ICD-10-CM

## 2019-02-26 LAB
ALBUMIN SERPL BCP-MCNC: 3.6 G/DL (ref 3.5–5.7)
ALP SERPL-CCNC: 66 U/L (ref 46–116)
ALT SERPL W P-5'-P-CCNC: 50 U/L (ref 12–78)
ANION GAP SERPL CALCULATED.3IONS-SCNC: 0 MMOL/L (ref 4–13)
AST SERPL W P-5'-P-CCNC: 32 U/L (ref 5–45)
BASOPHILS # BLD MANUAL: 0 THOUSAND/UL (ref 0–0.1)
BASOPHILS NFR MAR MANUAL: 0 % (ref 0–1)
BILIRUB SERPL-MCNC: 0.5 MG/DL (ref 0.2–1)
BUN SERPL-MCNC: 20 MG/DL (ref 5–25)
CALCIUM SERPL-MCNC: 9.1 MG/DL (ref 8.3–10.1)
CHLORIDE SERPL-SCNC: 110 MMOL/L (ref 98–108)
CO2 SERPL-SCNC: 30 MMOL/L (ref 21–32)
CREAT SERPL-MCNC: 1 MG/DL (ref 0.6–1.3)
EOSINOPHIL # BLD MANUAL: 0 THOUSAND/UL (ref 0–0.4)
EOSINOPHIL NFR BLD MANUAL: 0 % (ref 0–6)
ERYTHROCYTE [DISTWIDTH] IN BLOOD BY AUTOMATED COUNT: 14.4 % (ref 11.6–15.1)
GFR SERPL CREATININE-BSD FRML MDRD: 79 ML/MIN/1.73SQ M
GLUCOSE SERPL-MCNC: 143 MG/DL (ref 65–140)
HCT VFR BLD AUTO: 39.8 % (ref 36.5–49.3)
HGB BLD-MCNC: 13.2 G/DL (ref 12–17)
IGG SERPL-MCNC: 59 MG/DL (ref 700–1600)
LDH SERPL-CCNC: 183 U/L (ref 81–234)
LYMPHOCYTES # BLD AUTO: 0.63 THOUSAND/UL (ref 0.6–4.47)
LYMPHOCYTES # BLD AUTO: 17 % (ref 14–44)
MCH RBC QN AUTO: 30.4 PG (ref 26.8–34.3)
MCHC RBC AUTO-ENTMCNC: 33.2 G/DL (ref 31.4–37.4)
MCV RBC AUTO: 92 FL (ref 82–98)
MONOCYTES # BLD AUTO: 0.41 THOUSAND/UL (ref 0–1.22)
MONOCYTES NFR BLD: 11 % (ref 4–12)
NEUTROPHILS # BLD MANUAL: 2.66 THOUSAND/UL (ref 1.85–7.62)
NEUTS SEG NFR BLD AUTO: 72 % (ref 43–75)
PLATELET # BLD AUTO: 88 THOUSANDS/UL (ref 149–390)
PLATELET BLD QL SMEAR: ABNORMAL
PMV BLD AUTO: 11.9 FL (ref 8.9–12.7)
POTASSIUM SERPL-SCNC: 3.6 MMOL/L (ref 3.5–5.3)
PROT SERPL-MCNC: 5.6 G/DL (ref 6.4–8.2)
RBC # BLD AUTO: 4.34 MILLION/UL (ref 3.88–5.62)
RBC MORPH BLD: NORMAL
RETICS # AUTO: ABNORMAL 10*3/UL (ref 14356–105094)
RETICS # CALC: 2.04 % (ref 0.37–1.87)
SODIUM SERPL-SCNC: 140 MMOL/L (ref 136–145)
TOTAL CELLS COUNTED SPEC: 100
URATE SERPL-MCNC: 4.2 MG/DL (ref 4.2–8)
WBC # BLD AUTO: 3.7 THOUSAND/UL (ref 4.31–10.16)

## 2019-02-26 PROCEDURE — 85045 AUTOMATED RETICULOCYTE COUNT: CPT | Performed by: INTERNAL MEDICINE

## 2019-02-26 PROCEDURE — 82784 ASSAY IGA/IGD/IGG/IGM EACH: CPT | Performed by: INTERNAL MEDICINE

## 2019-02-26 PROCEDURE — 85027 COMPLETE CBC AUTOMATED: CPT | Performed by: INTERNAL MEDICINE

## 2019-02-26 PROCEDURE — 85007 BL SMEAR W/DIFF WBC COUNT: CPT | Performed by: INTERNAL MEDICINE

## 2019-02-26 PROCEDURE — 96367 TX/PROPH/DG ADDL SEQ IV INF: CPT

## 2019-02-26 PROCEDURE — 84550 ASSAY OF BLOOD/URIC ACID: CPT | Performed by: INTERNAL MEDICINE

## 2019-02-26 PROCEDURE — 96413 CHEMO IV INFUSION 1 HR: CPT

## 2019-02-26 PROCEDURE — 83615 LACTATE (LD) (LDH) ENZYME: CPT | Performed by: INTERNAL MEDICINE

## 2019-02-26 PROCEDURE — 96415 CHEMO IV INFUSION ADDL HR: CPT

## 2019-02-26 PROCEDURE — 80053 COMPREHEN METABOLIC PANEL: CPT | Performed by: INTERNAL MEDICINE

## 2019-02-26 RX ADMIN — DEXAMETHASONE SODIUM PHOSPHATE 20 MG: 10 INJECTION, SOLUTION INTRAMUSCULAR; INTRAVENOUS at 10:23

## 2019-02-26 RX ADMIN — DIPHENHYDRAMINE HYDROCHLORIDE 25 MG: 50 INJECTION, SOLUTION INTRAMUSCULAR; INTRAVENOUS at 09:40

## 2019-02-26 RX ADMIN — ACETAMINOPHEN 650 MG: 325 TABLET, FILM COATED ORAL at 09:36

## 2019-02-26 RX ADMIN — OBINUTUZUMAB 1000 MG: 1000 INJECTION, SOLUTION, CONCENTRATE INTRAVENOUS at 11:03

## 2019-02-26 RX ADMIN — SODIUM CHLORIDE 20 ML/HR: 0.9 INJECTION, SOLUTION INTRAVENOUS at 08:58

## 2019-02-26 NOTE — PLAN OF CARE
Problem: Potential for Falls  Goal: Patient will remain free of falls  Description  INTERVENTIONS:  - Assess patient frequently for physical needs  -  Identify cognitive and physical deficits and behaviors that affect risk of falls    -  [place] fall precautions as indicated by assessment   - Educate patient/family on patient safety including physical limitations  - Instruct patient to call for assistance with activity based on assessment  - Modify environment to reduce risk of injury  - Consider OT/PT consult to assist with strengthening/mobility  Outcome: Progressing

## 2019-02-28 ENCOUNTER — OFFICE VISIT (OUTPATIENT)
Dept: CARDIOLOGY CLINIC | Facility: CLINIC | Age: 64
End: 2019-02-28
Payer: MEDICARE

## 2019-02-28 VITALS
HEIGHT: 72 IN | BODY MASS INDEX: 32.02 KG/M2 | HEART RATE: 75 BPM | DIASTOLIC BLOOD PRESSURE: 80 MMHG | RESPIRATION RATE: 18 BRPM | WEIGHT: 236.4 LBS | SYSTOLIC BLOOD PRESSURE: 150 MMHG

## 2019-02-28 DIAGNOSIS — E78.2 MIXED HYPERLIPIDEMIA: ICD-10-CM

## 2019-02-28 DIAGNOSIS — F41.9 ANXIETY: ICD-10-CM

## 2019-02-28 DIAGNOSIS — I25.10 CORONARY ARTERY DISEASE INVOLVING NATIVE CORONARY ARTERY OF NATIVE HEART WITHOUT ANGINA PECTORIS: Primary | ICD-10-CM

## 2019-02-28 DIAGNOSIS — I10 ESSENTIAL HYPERTENSION: ICD-10-CM

## 2019-02-28 PROCEDURE — 99213 OFFICE O/P EST LOW 20 MIN: CPT | Performed by: INTERNAL MEDICINE

## 2019-02-28 RX ORDER — LOSARTAN POTASSIUM 50 MG/1
25 TABLET ORAL DAILY
Qty: 90 TABLET | Refills: 3 | Status: SHIPPED | OUTPATIENT
Start: 2019-02-28 | End: 2019-06-03 | Stop reason: SDUPTHER

## 2019-02-28 RX ORDER — FENOFIBRATE 145 MG/1
134 TABLET, COATED ORAL DAILY
Qty: 90 TABLET | Refills: 3 | Status: SHIPPED | OUTPATIENT
Start: 2019-02-28 | End: 2020-03-11

## 2019-02-28 RX ORDER — CITALOPRAM 40 MG/1
40 TABLET ORAL DAILY
Qty: 90 TABLET | Refills: 3 | Status: SHIPPED | OUTPATIENT
Start: 2019-02-28 | End: 2019-12-05 | Stop reason: SDUPTHER

## 2019-02-28 NOTE — ASSESSMENT & PLAN NOTE
Coronary artery disease, stable with no symptoms of angina or signs of congestive heart failure  We will continue the same medications

## 2019-02-28 NOTE — LETTER
February 28, 2019     Jaylon Cade MD  [address]  [place] [address]    Patient: Thanh Jarquin   YOB: 1955   Date of Visit: 2/28/2019       Dear Dr Lorraine Mathew: Thank you for referring Vicente Lee to me for evaluation  Below are my notes for this consultation  If you have questions, please do not hesitate to call me  I look forward to following your patient along with you  Sincerely,        Belgica Waters MD        CC: No Recipients  Belgica Waters MD  2/28/2019 10:30 AM  Sign at close encounter  Assessment/Plan:    CAD (coronary artery disease)  Coronary artery disease, stable with no symptoms of angina or signs of congestive heart failure  We will continue the same medications  Hypertension  Hypertension, less than optimally controlled  I will restart losartan at 50 mg daily and continue the other medications  Diagnoses and all orders for this visit:    Coronary artery disease involving native coronary artery of native heart without angina pectoris    Essential hypertension  -     losartan (COZAAR) 50 mg tablet; Take 0 5 tablets (25 mg total) by mouth daily    Mixed hyperlipidemia  -     fenofibrate (TRICOR) 145 mg tablet; Take 1 tablet (145 mg total) by mouth daily    Anxiety  -     citalopram (CeleXA) 40 mg tablet; Take 1 tablet (40 mg total) by mouth daily    Other orders  -     Potassium (POTASSIMIN PO); Take 20 mEq by mouth daily          Subjective:  Feels rather well  Patient ID: Thanh Jarquin is a 58 y o  male  The patient presented to this office for the purpose of cardiac follow-up  He has a longstanding history of coronary artery disease status post coronary angioplasty of the right coronary system in 2004  He also has a history of hypertension and hyperlipidemia  Patient is feeling rather well denying any symptom of chest pain, shortness of breath, palpitation, dizziness or lightheadedness  He has experienced no further leg edema          The following portions of the patient's history were reviewed and updated as appropriate: allergies, current medications, past family history, past medical history, past social history, past surgical history and problem list     Review of Systems   Respiratory: Negative for apnea, cough, chest tightness, shortness of breath and wheezing  Cardiovascular: Negative for chest pain, palpitations and leg swelling  Gastrointestinal: Negative for abdominal pain  Neurological: Negative for dizziness and light-headedness  Objective:  Stable cardiac-wise but for elevated blood pressure  /80 (BP Location: Right arm, Patient Position: Sitting)   Pulse 75   Resp 18   Ht 6' (1 829 m)   Wt 107 kg (236 lb 6 4 oz)   BMI 32 06 kg/m²           Physical Exam   Constitutional: He is oriented to person, place, and time  He appears well-developed and well-nourished  No distress  HENT:   Head: Normocephalic  Eyes: Pupils are equal, round, and reactive to light  Neck: Normal range of motion  No JVD present  No thyromegaly present  Cardiovascular: Normal rate, regular rhythm, S1 normal and S2 normal  Exam reveals no gallop and no friction rub  Murmur heard  Crescendo systolic murmur is present with a grade of 1/6  Pulmonary/Chest: Effort normal and breath sounds normal  No respiratory distress  He has no wheezes  He has no rales  He exhibits no tenderness  Abdominal: Soft  Musculoskeletal: Normal range of motion  He exhibits no edema, tenderness or deformity  Neurological: He is alert and oriented to person, place, and time  Skin: Skin is warm and dry  He is not diaphoretic  Psychiatric: He has a normal mood and affect  Vitals reviewed

## 2019-02-28 NOTE — PROGRESS NOTES
Assessment/Plan:    CAD (coronary artery disease)  Coronary artery disease, stable with no symptoms of angina or signs of congestive heart failure  We will continue the same medications  Hypertension  Hypertension, less than optimally controlled  I will restart losartan at 50 mg daily and continue the other medications  Diagnoses and all orders for this visit:    Coronary artery disease involving native coronary artery of native heart without angina pectoris    Essential hypertension  -     losartan (COZAAR) 50 mg tablet; Take 0 5 tablets (25 mg total) by mouth daily    Mixed hyperlipidemia  -     fenofibrate (TRICOR) 145 mg tablet; Take 1 tablet (145 mg total) by mouth daily    Anxiety  -     citalopram (CeleXA) 40 mg tablet; Take 1 tablet (40 mg total) by mouth daily    Other orders  -     Potassium (POTASSIMIN PO); Take 20 mEq by mouth daily          Subjective:  Feels rather well  Patient ID: Donn Taylor is a 58 y o  male  The patient presented to this office for the purpose of cardiac follow-up  He has a longstanding history of coronary artery disease status post coronary angioplasty of the right coronary system in 2004  He also has a history of hypertension and hyperlipidemia  Patient is feeling rather well denying any symptom of chest pain, shortness of breath, palpitation, dizziness or lightheadedness  He has experienced no further leg edema  The following portions of the patient's history were reviewed and updated as appropriate: allergies, current medications, past family history, past medical history, past social history, past surgical history and problem list     Review of Systems   Respiratory: Negative for apnea, cough, chest tightness, shortness of breath and wheezing  Cardiovascular: Negative for chest pain, palpitations and leg swelling  Gastrointestinal: Negative for abdominal pain  Neurological: Negative for dizziness and light-headedness           Objective: Stable cardiac-wise but for elevated blood pressure  /80 (BP Location: Right arm, Patient Position: Sitting)   Pulse 75   Resp 18   Ht 6' (1 829 m)   Wt 107 kg (236 lb 6 4 oz)   BMI 32 06 kg/m²          Physical Exam   Constitutional: He is oriented to person, place, and time  He appears well-developed and well-nourished  No distress  HENT:   Head: Normocephalic  Eyes: Pupils are equal, round, and reactive to light  Neck: Normal range of motion  No JVD present  No thyromegaly present  Cardiovascular: Normal rate, regular rhythm, S1 normal and S2 normal  Exam reveals no gallop and no friction rub  Murmur heard  Crescendo systolic murmur is present with a grade of 1/6  Pulmonary/Chest: Effort normal and breath sounds normal  No respiratory distress  He has no wheezes  He has no rales  He exhibits no tenderness  Abdominal: Soft  Musculoskeletal: Normal range of motion  He exhibits no edema, tenderness or deformity  Neurological: He is alert and oriented to person, place, and time  Skin: Skin is warm and dry  He is not diaphoretic  Psychiatric: He has a normal mood and affect  Vitals reviewed

## 2019-03-01 ENCOUNTER — TELEPHONE (OUTPATIENT)
Dept: HEMATOLOGY ONCOLOGY | Facility: CLINIC | Age: 64
End: 2019-03-01

## 2019-03-01 ENCOUNTER — TELEPHONE (OUTPATIENT)
Dept: HEMATOLOGY ONCOLOGY | Facility: HOSPITAL | Age: 64
End: 2019-03-01

## 2019-03-01 NOTE — TELEPHONE ENCOUNTER
Patient decreased Prednisone as directed and is down to 5mg  He had chemo Tuesday this week and his right arm,hand is swollen? He is asking if he should stay on the 5 mg of Prednisone? Also he stopped the Imbruvica does Verito Pabon want him to restart it?  Please call patient

## 2019-03-01 NOTE — TELEPHONE ENCOUNTER
Spoke with patient  Diffuse arthralgias have resolved  He has remained off ibrutinib for 1 week  He is advised to restart this today  He is decreased back to maintenance dose of 5 mg prednisone which he takes for history of hemolytic anemia  He states that he has noticed slight worsening of his arthralgias specifically in his hands  He may trial 10 mg prednisone p o  Daily  He will continue to keep was updated

## 2019-03-20 DIAGNOSIS — J45.909 MODERATE ASTHMA, UNSPECIFIED WHETHER COMPLICATED, UNSPECIFIED WHETHER PERSISTENT: Primary | ICD-10-CM

## 2019-03-21 NOTE — PROGRESS NOTES
Assessment and Plan:  Problem List Items Addressed This Visit     None      Visit Diagnoses     Need for hepatitis C screening test    -  Primary    Encounter for vaccination            Health Maintenance Due   Topic Date Due    Hepatitis C Screening  1955    Medicare Annual Wellness Visit (AWV)  1955    Diabetic Foot Exam  08/18/1964    BMI: Followup Plan  08/18/1972    HEPATITIS B VACCINES (1 of 3 - Risk 3-dose series) 08/18/1973    Pneumococcal PPSV23 Highest Risk Adult (1 of 3 - PCV13) 08/18/1973    DTaP,Tdap,and Td Vaccines (1 - Tdap) 08/18/1975         HPI:  Patient Active Problem List   Diagnosis    CAD (coronary artery disease)    CLL (chronic lymphocytic leukemia) ([facility] [address] )    Hyperlipidemia    Hypertension    History of TIA (transient ischemic attack)    Gastroesophageal reflux disease with esophagitis    Candida esophagitis (HCC)    CKD (chronic kidney disease) stage 3, GFR 30-59 ml/min (HCC)    H/O blood clots    Hemolytic anemia (HCC)    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)    Anemia, chronic disease    Chronic lymphocytic leukemia (HCC)    Leukopenia due to antineoplastic chemotherapy (HCC)    Hyperglycemia    Cellulitis of head or scalp    Pancytopenia (HCC)    Headache around the eyes    Gastroesophageal reflux disease without esophagitis    Colitis, acute    Listeria bacteremia    Thrombocytopenia ([address]    Diabetes mellitus ([facility] [address] )    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HCC)    Chronic right shoulder pain    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[address] )    Ulcer of esophagus without bleeding    Esophageal stricture    Dysphagia    Esophageal dysphagia    Acute bursitis of right shoulder     Past Medical History:   Diagnosis Date    Anemia     Arthritis     CAD (coronary artery disease) 2004    Cellulitis of scalp     CKD (chronic kidney disease) stage 3, GFR 30-59 ml/min (HCC)     CLL (chronic lymphocytic leukemia) (HCC)     COPD (chronic obstructive pulmonary disease) ([address] )  Diabetes mellitus ([facility] [address] )     Dyslipidemia     Edema extremities     Esophageal ulcer     H/O blood clots     Hemolytic anemia (HCC)     Hiatal hernia     History of TIA (transient ischemic attack)     Hyperlipidemia     Hypertension     Listeria infection 2018    Multiple gastric ulcers     Myocardial infarction [facility]) 2004    acute    Neutropenia (HCC)     Obese     Recurrent sinusitis     Sleep apnea     Thrombocytopenia (HCC)     Vertigo      Past Surgical History:   Procedure Laterality Date    ARTHROSCOPIC REPAIR ACL      lt knee    CARDIAC SURGERY      cardiac cath with stent    FOOT SURGERY      HAND SURGERY      KNEE ARTHROSCOPY      OTHER SURGICAL HISTORY      cardiac cath lesion 1, 1st adjunct treat device: stent    PORTACATH PLACEMENT      DC ESOPHAGOGASTRODUODENOSCOPY TRANSORAL DIAGNOSTIC N/A 10/5/2017    Procedure: ESOPHAGOGASTRODUODENOSCOPY (EGD) with bx;  Surgeon: Marlene Gatica DO;  Location: [facility]; Service: Gastroenterology    DC ESOPHAGOGASTRODUODENOSCOPY TRANSORAL DIAGNOSTIC N/A 3/8/2018    Procedure: ESOPHAGOGASTRODUODENOSCOPY (EGD) with biopsy;  Surgeon: Marlene Gatica DO;  [facility]; Service: Gastroenterology    DC ESOPHAGOGASTRODUODENOSCOPY TRANSORAL DIAGNOSTIC N/A 6/20/2018    Procedure: ESOPHAGOGASTRODUODENOSCOPY (EGD) with Dilation;  Surgeon: Marlene Gatica DO;  [facility]: [facility]; Service: Gastroenterology    DC ESOPHAGOGASTRODUODENOSCOPY TRANSORAL DIAGNOSTIC N/A 10/18/2018    Procedure: ESOPHAGOGASTRODUODENOSCOPY (EGD) with dilation;  Surgeon: Elyse Obando MD;  Location: [facility];   Service: Gastroenterology    DC ESOPHAGOSCOPY FLEXIBLE TRANSORAL WITH BIOPSY N/A 9/2/2016    Procedure: ESOPHAGOGASTRODUODENOSCOPY (EGD); ESOPHAGEAL DILATION; ESOPHAGEAL BIOPSY;  Surgeon: Leigh Ann Wagner MD;  [facility];  Service: Thoracic    TONSILLECTOMY      UPPER GASTROINTESTINAL ENDOSCOPY      x 6     Family History   Problem Relation Age of Onset    Leukemia Mother         chronic    Colon polyps Mother         sidmoid    Parkinsonism Father      Social History     Tobacco Use   Smoking Status Former Smoker    Packs/day: 1 00    Years: 10 00    Pack years: 10 00    Types: Cigarettes    Last attempt to quit: 2015    Years since quittin 0   Smokeless Tobacco Never Used     Social History     Substance and Sexual Activity   Alcohol Use Yes    Alcohol/week: 1 2 oz    Types: 2 Cans of beer per week    Comment: social use as per [employer]      Social History     Substance and Sexual Activity   Drug Use No         Current Outpatient Medications   Medication Sig Dispense Refill    acyclovir (ZOVIRAX) 400 MG tablet Take 1 tablet (400 mg total) by mouth 2 (two) times a day for 180 days 60 tablet 5    aspirin 81 MG tablet Take 81 mg by mouth every other day Every other day       citalopram (CeleXA) 40 mg tablet Take 1 tablet (40 mg total) by mouth daily 90 tablet 3    fenofibrate (TRICOR) 145 mg tablet Take 1 tablet (145 mg total) by mouth daily 90 tablet 3    folic acid (FOLVITE) 1 mg tablet Take 1 mg by mouth daily      furosemide (LASIX) 40 mg tablet furosemide 40 mg tablet      gabapentin (NEURONTIN) 600 MG tablet Take 1 tablet (600 mg total) by mouth daily 30 tablet 2    glucose monitoring kit (FREESTYLE) monitoring kit 1 each by Does not apply route as needed ( ) E 11 9 1 each 0    Ibrutinib 140 MG TABS Take 140 mg by mouth daily for 28 days 30 tablet 5    insulin lispro (HumaLOG) 100 units/mL injection Inject 10 Units under the skin 3 (three) times a day before meals Given with chemo infusion monthly 4 mL 6    Insulin Syringe-Needle U-100 30G X 1/2" 0 3 ML MISC by Does not apply route 2 (two) times a day 100 each 6    Lancets (FREESTYLE) lancets Use as instructed--test 2 times a day    E 11 9 100 each 0    losartan (COZAAR) 50 mg tablet Take 0 5 tablets (25 mg total) by mouth daily 90 tablet 3    multivitamin [facility]) TABS Take 1 tablet by mouth daily      obinutuzumab (GAZYVA) 1000 mg/40 mL SOLN Infuse into a venous catheter      oxyCODONE (ROXICODONE) 10 MG TABS Take 1 tablet (10 mg total) by mouth every 6 (six) hours as needed for moderate pain For ongoing pain control Max Daily Amount: 40 mg 90 tablet 0    pantoprazole (PROTONIX) 40 mg tablet Take 1 tablet (40 mg total) by mouth 2 (two) times a day for 30 days 60 tablet 6    Potassium (POTASSIMIN PO) Take 20 mEq by mouth daily      predniSONE 10 mg tablet Take 1 tablet (10 mg total) by mouth daily (Patient taking differently: Take 5 mg by mouth daily  ) 30 tablet 2    predniSONE 5 mg tablet TAKE 1 TO 2 TABLETS EVERY DAY AS DIRECTED WITH FOOD 30 tablet 4    sodium chloride, PF, 0 9 % 10 mL by Intracatheter route see administration instructions 10mL into port before and after ampicillin doses  0    VENTOLIN  (90 Base) MCG/ACT inhaler Inhale 2 puffs 4 (four) times a day as needed for wheezing 2 Inhaler 3     No current facility-administered medications for this visit  Allergies   Allergen Reactions    Heparin Other (See Comments)     Other reaction(s): Blood Disorders  clot    Macrolides And Ketolides Other (See Comments)     Interacts with other meds he is taking     Immunization History   Administered Date(s) Administered    INFLUENZA 12/05/2006, 11/02/2007, 10/29/2010, 03/20/2019       Patient Care Team:  Courtney Shepherd MD as PCP - General (Internal Medicine)  Jose L Espinoza MD as PCP - Endocrinology (Endocrinology)  MD aMura Malhotra Jackie Merry, MD Heddie Hila, MD Jeannene Bunk, MD Alyn Palin, MD    Medicare Screening Tests and Risk Assessments:  Dennis Perez is here for his Subsequent Wellness visit  Health Risk Assessment:  Patient rates overall health as good  Patient feels that their physical health rating is Same  Eyesight was rated as Same  Hearing was rated as Same   Patient feels that their emotional and mental health rating is Same  Pain experienced by patient in the last 7 days has been None  Patient states that he has experienced no weight loss or gain in last 6 months  Emotional/Mental Health:  Patient has been feeling nervous/anxious  PHQ-9 Depression Screening:    Frequency of the following problems over the past two weeks:      1  Little interest or pleasure in doing things: 0 - not at all      2  Feeling down, depressed, or hopeless: 0 - not at all  PHQ-2 Score: 0          Broken Bones/Falls: Fall Risk Assessment:    In the past year, patient has experienced: No history of falling in past year          Bladder/Bowel:  Patient has not leaked urine accidently in the last six months  Patient reports no loss of bowel control  Home Safety:  Patient does not have trouble with stairs inside or outside of their home  Patient currently reports that there are no safety hazards present in home, working smoke alarms, working carbon monoxide detectors  Preventative Screenings:   prostate cancer screen performed, colon cancer screen completed, cholesterol screen completed, glaucoma eye exam completed,     Nutrition:  Current diet: Regular with servings of the following:    Medications:  Patient is not currently taking any over-the-counter supplements  Patient is able to manage medications  Lifestyle Choices:  Patient reports no tobacco use  Patient has not smoked or used tobacco in the past   Patient reports no alcohol use  Patient drives a vehicle  Patient wears seat belt  Activities of Daily Living:  Can get out of bed by his or her self, able to dress self, able to make own meals, able to do own shopping, able to bathe self, can do own laundry/housekeeping, can manage own money, pay bills and track expenses    Previous Hospitalizations:  No hospitalization or ED visit in past 12 months        Advanced Directives:  Patient has decided on a power of     Patient has spoken to designated power of   Patient has completed advanced directive  Preventative Screening/Counseling:      Cardiovascular:      General: Screening Current          Diabetes:      General: Screening Current          Colorectal Cancer:      General: Screening Current      Comments: Last colonoscopy the  2016 Dr Wesley Coppola 4 mm polyp found        Prostate Cancer:      Due for labs: PSA          Osteoporosis:      Counseling: Regular Weightbearing Exercise          AAA:      General: Screening Not Indicated          Glaucoma:      General: Screening Current          HIV:      General: Screening Not Indicated          Hepatitis C:      Counseling: has received general HCV counseling        Advanced Directives:   Patient has living will for healthcare, has durable POA for healthcare, Information on ACP and/or AD provided  Additional Comments: POA=wife        No exam data present    Physical Exam:  Review of Systems   Gastrointestinal: Negative for bowel incontinence  Psychiatric/Behavioral: The patient is not nervous/anxious  There were no vitals filed for this visit  There is no height or weight on file to calculate BMI      Physical Exam

## 2019-03-25 ENCOUNTER — OFFICE VISIT (OUTPATIENT)
Dept: FAMILY MEDICINE CLINIC | Facility: CLINIC | Age: 64
End: 2019-03-25
Payer: MEDICARE

## 2019-03-25 VITALS
SYSTOLIC BLOOD PRESSURE: 148 MMHG | WEIGHT: 230 LBS | HEART RATE: 76 BPM | HEIGHT: 72 IN | BODY MASS INDEX: 31.15 KG/M2 | OXYGEN SATURATION: 97 % | DIASTOLIC BLOOD PRESSURE: 92 MMHG

## 2019-03-25 DIAGNOSIS — Z23 ENCOUNTER FOR VACCINATION: ICD-10-CM

## 2019-03-25 DIAGNOSIS — K21.9 GASTROESOPHAGEAL REFLUX DISEASE WITHOUT ESOPHAGITIS: ICD-10-CM

## 2019-03-25 DIAGNOSIS — T45.1X5A LEUKOPENIA DUE TO ANTINEOPLASTIC CHEMOTHERAPY (HCC): ICD-10-CM

## 2019-03-25 DIAGNOSIS — Z00.00 MEDICARE ANNUAL WELLNESS VISIT, SUBSEQUENT: ICD-10-CM

## 2019-03-25 DIAGNOSIS — E01.0 THYROMEGALY: ICD-10-CM

## 2019-03-25 DIAGNOSIS — J44.9 CHRONIC OBSTRUCTIVE PULMONARY DISEASE, UNSPECIFIED COPD TYPE (HCC): ICD-10-CM

## 2019-03-25 DIAGNOSIS — D70.1 LEUKOPENIA DUE TO ANTINEOPLASTIC CHEMOTHERAPY (HCC): ICD-10-CM

## 2019-03-25 DIAGNOSIS — Z11.59 NEED FOR HEPATITIS C SCREENING TEST: Primary | ICD-10-CM

## 2019-03-25 DIAGNOSIS — Z12.5 SCREENING FOR PROSTATE CANCER: ICD-10-CM

## 2019-03-25 PROBLEM — K22.10 ULCER OF ESOPHAGUS WITHOUT BLEEDING: Status: ACTIVE | Noted: 2017-09-11

## 2019-03-25 PROBLEM — N18.9 CHRONIC KIDNEY DISEASE: Status: ACTIVE | Noted: 2017-08-21

## 2019-03-25 PROCEDURE — 99214 OFFICE O/P EST MOD 30 MIN: CPT | Performed by: INTERNAL MEDICINE

## 2019-03-25 PROCEDURE — G0439 PPPS, SUBSEQ VISIT: HCPCS | Performed by: INTERNAL MEDICINE

## 2019-03-25 RX ORDER — SODIUM CHLORIDE 9 MG/ML
20 INJECTION, SOLUTION INTRAVENOUS CONTINUOUS
Status: DISCONTINUED | OUTPATIENT
Start: 2019-03-26 | End: 2019-03-29 | Stop reason: HOSPADM

## 2019-03-25 RX ORDER — ACETAMINOPHEN 325 MG/1
650 TABLET ORAL ONCE
Status: COMPLETED | OUTPATIENT
Start: 2019-03-26 | End: 2019-03-26

## 2019-03-25 NOTE — PROGRESS NOTES
Assessment/Plan:         Diagnoses and all orders for this visit:    Need for hepatitis C screening test  -     Hepatitis C antibody; Future    Thyromegaly  -     US head neck soft tissue; Future    Gastroesophageal reflux disease without esophagitis  Continue with b i d  PPI  CLL  Under care of Dr Kiah Antony      Encounter for vaccination  -     PNEUMOCOCCAL CONJUGATE VACCINE 13-VALENT GREATER THAN 6 MONTHS    Screening for prostate cancer  PSA ordered  Chronic obstructive pulmonary disease, unspecified COPD type ([facility] [address] )    continue p r n  Use of albuterol  Consider CT scan of the chest in the future    Subjective:      Patient ID: Skylar Palma is a 58 y o  male  HPI  Patient history of CLL HIT thrombocytopenia history of listeria bacteremia, HSV esophagitis dysphagia with intermittent esophageal dilatation coronary disease CKD TIA hypertension diabetes mellitus  managed by different specialists is here to follow-up chronic medical problems  He has been doing quite well  Occasionally would get shortness of breath for which she would take Ventolin  History of smoking several years ago  Reports postnasal drip shallow cough nonproductive fever chills but would get out of breath on exertion  Does not smoke anymore  No recent chest imaging  His recent labs were reviewed  Platelet counts have improved  He remains on 5 mg of prednisone  WBC count is stable  He has an appointment coming up with Hematology  His hemoglobin A1c is great at 6 7  He uses Lantus only as needed when he gets a Decadron injection with his chemotherapy  Blood pressure is excellent today      The following portions of the patient's history were reviewed and updated as appropriate: allergies, current medications, past family history, past medical history, past social history, past surgical history and problem list     Current Outpatient Medications:     acyclovir (ZOVIRAX) 400 MG tablet, Take 1 tablet (400 mg total) by mouth 2 (two) times a day for 180 days, Disp: 60 tablet, Rfl: 5    aspirin 81 MG tablet, Take 81 mg by mouth every other day Every other day , Disp: , Rfl:     citalopram (CeleXA) 40 mg tablet, Take 1 tablet (40 mg total) by mouth daily, Disp: 90 tablet, Rfl: 3    folic acid (FOLVITE) 1 mg tablet, Take 1 mg by mouth daily, Disp: , Rfl:     gabapentin (NEURONTIN) 600 MG tablet, Take 1 tablet (600 mg total) by mouth daily, Disp: 30 tablet, Rfl: 2    glucose monitoring kit (FREESTYLE) monitoring kit, 1 each by Does not apply route as needed ( ) E 11 9, Disp: 1 each, Rfl: 0    insulin lispro (HumaLOG) 100 units/mL injection, Inject 10 Units under the skin 3 (three) times a day before meals Given with chemo infusion monthly, Disp: 4 mL, Rfl: 6    Insulin Syringe-Needle U-100 30G X 1/2" 0 3 ML MISC, by Does not apply route 2 (two) times a day, Disp: 100 each, Rfl: 6    Lancets (FREESTYLE) lancets, Use as instructed--test 2 times a day  E 11 9, Disp: 100 each, Rfl: 0    losartan (COZAAR) 50 mg tablet, Take 0 5 tablets (25 mg total) by mouth daily, Disp: 90 tablet, Rfl: 3    multivitamin (THERAGRAN) TABS, Take 1 tablet by mouth daily, Disp: , Rfl:     obinutuzumab (GAZYVA) 1000 mg/40 mL SOLN, Infuse into a venous catheter, Disp: , Rfl:     oxyCODONE (ROXICODONE) 10 MG TABS, Take 1 tablet (10 mg total) by mouth every 6 (six) hours as needed for moderate pain For ongoing pain control Max Daily Amount: 40 mg, Disp: 90 tablet, Rfl: 0    Potassium (POTASSIMIN PO), Take 20 mEq by mouth daily, Disp: , Rfl:     predniSONE 10 mg tablet, Take 1 tablet (10 mg total) by mouth daily (Patient taking differently: Take 5 mg by mouth daily  ), Disp: 30 tablet, Rfl: 2    predniSONE 5 mg tablet, TAKE 1 TO 2 TABLETS EVERY DAY AS DIRECTED WITH FOOD, Disp: 30 tablet, Rfl: 4    sodium chloride, PF, 0 9 %, 10 mL by Intracatheter route see administration instructions 10mL into port before and after ampicillin doses, Disp: , Rfl: 0    VENTOLIN  (90 Base) MCG/ACT inhaler, Inhale 2 puffs 4 (four) times a day as needed for wheezing, Disp: 2 Inhaler, Rfl: 3    fenofibrate (TRICOR) 145 mg tablet, Take 1 tablet (145 mg total) by mouth daily, Disp: 90 tablet, Rfl: 3    furosemide (LASIX) 40 mg tablet, furosemide 40 mg tablet, Disp: , Rfl:     Ibrutinib 140 MG TABS, Take 140 mg by mouth daily for 28 days, Disp: 30 tablet, Rfl: 5    pantoprazole (PROTONIX) 40 mg tablet, Take 1 tablet (40 mg total) by mouth 2 (two) times a day for 30 days, Disp: 60 tablet, Rfl: 6  No current facility-administered medications for this visit  Facility-Administered Medications Ordered in Other Visits:     [START ON 3/26/2019] acetaminophen (TYLENOL) tablet 650 mg, 650 mg, Oral, Once, Tyrone Tom MD  [facility]  [START ON 3/26/2019] dexamethasone (DECADRON) 20 mg in sodium chloride 0 9 % 50 mL IVPB, 20 mg, Intravenous, Once, Tyrone Tom MD  [facility]  [START ON 3/26/2019] diphenhydrAMINE (BENADRYL) 25 mg in sodium chloride 0 9 % 50 mL IVPB, 25 mg, Intravenous, Once, Tyrone Tom MD    [START ON 3/26/2019] obinutuzumab (GAZYVA) 1,000 mg in sodium chloride 0 9 % 250 mL infusion, 1,000 mg, Intravenous, Once, Tyrone Tom MD    [START ON 3/26/2019] sodium chloride 0 9 % infusion, 20 mL/hr, Intravenous, Continuous, Tyrone Tom MD  Review of Systems   Constitutional: Negative for chills and fever  HENT: Positive for congestion and postnasal drip  Negative for trouble swallowing (See HPI) and voice change  Eyes: Negative for visual disturbance  Respiratory: Positive for shortness of breath (With exertion)  Negative for cough  Cardiovascular: Negative for chest pain and leg swelling  Gastrointestinal: Negative for abdominal pain, blood in stool, constipation and diarrhea  Genitourinary: Positive for hematuria (1 episode of hematuria yesterday no dysuria  Urine cleared up already)  Negative for dysuria     Musculoskeletal: Positive for arthralgias (more after obinutuzumab inf)  Skin: Negative for rash  Allergic/Immunologic: Positive for environmental allergies  Neurological: Negative for dizziness  Psychiatric/Behavioral: Negative for agitation and sleep disturbance  Objective:      /92 (BP Location: Left arm, Patient Position: Sitting, Cuff Size: Standard)   Pulse 76   Ht 6' (1 829 m)   Wt 104 kg (230 lb)   SpO2 97%   BMI 31 19 kg/m²          Physical Exam   Constitutional:   Sinus mild congestion   HENT:   Mouth/Throat: Oropharynx is clear and moist    Eyes: Conjunctivae are normal    Neck: Thyromegaly (Right sidethyroid seems to be enlarged) present  Cardiovascular: Normal rate and regular rhythm  No murmur heard  Pulmonary/Chest: Effort normal and breath sounds normal  No stridor  No respiratory distress  He has no wheezes  Abdominal: Bowel sounds are normal  He exhibits no distension  There is no tenderness  There is no guarding  Nontender   Musculoskeletal: He exhibits no edema  Lymphadenopathy:     He has no cervical adenopathy  Neurological: He is alert  Skin: Skin is warm  Psychiatric: He has a normal mood and affect   His behavior is normal

## 2019-03-26 ENCOUNTER — HOSPITAL ENCOUNTER (OUTPATIENT)
Dept: INFUSION CENTER | Facility: CLINIC | Age: 64
Discharge: HOME | End: 2019-03-26
Payer: MEDICARE

## 2019-03-26 VITALS
RESPIRATION RATE: 18 BRPM | TEMPERATURE: 97.3 F | DIASTOLIC BLOOD PRESSURE: 80 MMHG | SYSTOLIC BLOOD PRESSURE: 145 MMHG | HEART RATE: 88 BPM

## 2019-03-26 LAB
ALBUMIN SERPL BCP-MCNC: 3.8 G/DL (ref 3.5–5.7)
ALP SERPL-CCNC: 69 U/L (ref 46–116)
ALT SERPL W P-5'-P-CCNC: 58 U/L (ref 12–78)
ANION GAP SERPL CALCULATED.3IONS-SCNC: 3 MMOL/L (ref 4–13)
AST SERPL W P-5'-P-CCNC: 35 U/L (ref 5–45)
BASOPHILS # BLD MANUAL: 0 THOUSAND/UL (ref 0–0.1)
BASOPHILS NFR MAR MANUAL: 0 % (ref 0–1)
BILIRUB SERPL-MCNC: 0.6 MG/DL (ref 0.2–1)
BUN SERPL-MCNC: 15 MG/DL (ref 5–25)
CALCIUM SERPL-MCNC: 9.3 MG/DL (ref 8.3–10.1)
CHLORIDE SERPL-SCNC: 109 MMOL/L (ref 98–108)
CO2 SERPL-SCNC: 29 MMOL/L (ref 21–32)
CREAT SERPL-MCNC: 1 MG/DL (ref 0.6–1.3)
EOSINOPHIL # BLD MANUAL: 0.33 THOUSAND/UL (ref 0–0.4)
EOSINOPHIL NFR BLD MANUAL: 7 % (ref 0–6)
ERYTHROCYTE [DISTWIDTH] IN BLOOD BY AUTOMATED COUNT: 14.1 % (ref 11.6–15.1)
GFR SERPL CREATININE-BSD FRML MDRD: 79 ML/MIN/1.73SQ M
GLUCOSE SERPL-MCNC: 136 MG/DL (ref 65–140)
HCT VFR BLD AUTO: 42.2 % (ref 36.5–49.3)
HGB BLD-MCNC: 13.9 G/DL (ref 12–17)
IGG SERPL-MCNC: 59 MG/DL (ref 700–1600)
LDH SERPL-CCNC: 202 U/L (ref 81–234)
LYMPHOCYTES # BLD AUTO: 0.62 THOUSAND/UL (ref 0.6–4.47)
LYMPHOCYTES # BLD AUTO: 13 % (ref 14–44)
MCH RBC QN AUTO: 30.1 PG (ref 26.8–34.3)
MCHC RBC AUTO-ENTMCNC: 32.9 G/DL (ref 31.4–37.4)
MCV RBC AUTO: 91 FL (ref 82–98)
MONOCYTES # BLD AUTO: 0.43 THOUSAND/UL (ref 0–1.22)
MONOCYTES NFR BLD: 9 % (ref 4–12)
NEUTROPHILS # BLD MANUAL: 3.28 THOUSAND/UL (ref 1.85–7.62)
NEUTS BAND NFR BLD MANUAL: 1 % (ref 0–8)
NEUTS SEG NFR BLD AUTO: 68 % (ref 43–75)
PLATELET # BLD AUTO: 59 THOUSANDS/UL (ref 149–390)
PLATELET BLD QL SMEAR: ABNORMAL
PMV BLD AUTO: 12.2 FL (ref 8.9–12.7)
POTASSIUM SERPL-SCNC: 4 MMOL/L (ref 3.5–5.3)
PROT SERPL-MCNC: 5.7 G/DL (ref 6.4–8.2)
RBC # BLD AUTO: 4.62 MILLION/UL (ref 3.88–5.62)
RBC MORPH BLD: NORMAL
RETICS # AUTO: ABNORMAL 10*3/UL (ref 14356–105094)
RETICS # CALC: 2.15 % (ref 0.37–1.87)
SODIUM SERPL-SCNC: 141 MMOL/L (ref 136–145)
TOTAL CELLS COUNTED SPEC: 100
URATE SERPL-MCNC: 3.7 MG/DL (ref 4.2–8)
VARIANT LYMPHS # BLD AUTO: 2 %
WBC # BLD AUTO: 4.75 THOUSAND/UL (ref 4.31–10.16)

## 2019-03-26 PROCEDURE — 85027 COMPLETE CBC AUTOMATED: CPT | Performed by: PHYSICIAN ASSISTANT

## 2019-03-26 PROCEDURE — 80053 COMPREHEN METABOLIC PANEL: CPT | Performed by: PHYSICIAN ASSISTANT

## 2019-03-26 PROCEDURE — 96415 CHEMO IV INFUSION ADDL HR: CPT

## 2019-03-26 PROCEDURE — 83615 LACTATE (LD) (LDH) ENZYME: CPT | Performed by: PHYSICIAN ASSISTANT

## 2019-03-26 PROCEDURE — 36415 COLL VENOUS BLD VENIPUNCTURE: CPT | Performed by: PHYSICIAN ASSISTANT

## 2019-03-26 PROCEDURE — 82784 ASSAY IGA/IGD/IGG/IGM EACH: CPT | Performed by: PHYSICIAN ASSISTANT

## 2019-03-26 PROCEDURE — 96413 CHEMO IV INFUSION 1 HR: CPT

## 2019-03-26 PROCEDURE — 84550 ASSAY OF BLOOD/URIC ACID: CPT | Performed by: PHYSICIAN ASSISTANT

## 2019-03-26 PROCEDURE — 96367 TX/PROPH/DG ADDL SEQ IV INF: CPT

## 2019-03-26 PROCEDURE — 85007 BL SMEAR W/DIFF WBC COUNT: CPT | Performed by: PHYSICIAN ASSISTANT

## 2019-03-26 PROCEDURE — 85045 AUTOMATED RETICULOCYTE COUNT: CPT | Performed by: PHYSICIAN ASSISTANT

## 2019-03-26 RX ADMIN — ACETAMINOPHEN 650 MG: 325 TABLET, FILM COATED ORAL at 08:55

## 2019-03-26 RX ADMIN — DIPHENHYDRAMINE HYDROCHLORIDE 25 MG: 50 INJECTION, SOLUTION INTRAMUSCULAR; INTRAVENOUS at 08:51

## 2019-03-26 RX ADMIN — DEXAMETHASONE SODIUM PHOSPHATE 20 MG: 10 INJECTION, SOLUTION INTRAMUSCULAR; INTRAVENOUS at 09:18

## 2019-03-26 RX ADMIN — OBINUTUZUMAB 1000 MG: 1000 INJECTION, SOLUTION, CONCENTRATE INTRAVENOUS at 09:45

## 2019-03-26 RX ADMIN — SODIUM CHLORIDE 20 ML/HR: 0.9 INJECTION, SOLUTION INTRAVENOUS at 08:51

## 2019-03-26 NOTE — PLAN OF CARE
Problem: Potential for Falls  Goal: Patient will remain free of falls  Description  INTERVENTIONS:  - Assess patient frequently for physical needs  -  Identify cognitive and physical deficits and behaviors that affect risk of falls    -  [place] fall precautions as indicated by assessment   - Educate patient/family on patient safety including physical limitations  - Instruct patient to call for assistance with activity based on assessment  - Modify environment to reduce risk of injury  - Consider OT/PT consult to assist with strengthening/mobility  Outcome: Progressing

## 2019-03-29 ENCOUNTER — HOSPITAL ENCOUNTER (OUTPATIENT)
Dept: ULTRASOUND IMAGING | Facility: HOSPITAL | Age: 64
Discharge: HOME | End: 2019-03-29
Payer: MEDICARE

## 2019-03-29 DIAGNOSIS — E01.0 THYROMEGALY: ICD-10-CM

## 2019-03-29 PROCEDURE — 76536 US EXAM OF HEAD AND NECK: CPT

## 2019-04-02 DIAGNOSIS — E04.1 THYROID CYST: Primary | ICD-10-CM

## 2019-04-03 ENCOUNTER — TELEPHONE (OUTPATIENT)
Dept: FAMILY MEDICINE CLINIC | Facility: CLINIC | Age: 64
End: 2019-04-03

## 2019-04-04 ENCOUNTER — APPOINTMENT (OUTPATIENT)
Dept: LAB | Facility: CLINIC | Age: 64
End: 2019-04-04
Payer: MEDICARE

## 2019-04-04 ENCOUNTER — HOSPITAL ENCOUNTER (OUTPATIENT)
Dept: INFUSION CENTER | Facility: CLINIC | Age: 64
Discharge: HOME | End: 2019-04-04
Payer: MEDICARE

## 2019-04-04 DIAGNOSIS — E04.1 THYROID CYST: ICD-10-CM

## 2019-04-04 DIAGNOSIS — Z87.891 HISTORY OF SMOKING 30 OR MORE PACK YEARS: Primary | ICD-10-CM

## 2019-04-04 LAB — PTH-INTACT SERPL-MCNC: 27.9 PG/ML (ref 18.4–80.1)

## 2019-04-04 PROCEDURE — 83970 ASSAY OF PARATHORMONE: CPT

## 2019-04-04 NOTE — PLAN OF CARE
Problem: Potential for Falls  Goal: Patient will remain free of falls  Description  INTERVENTIONS:  - Assess patient frequently for physical needs  -  Identify cognitive and physical deficits and behaviors that affect risk of falls    -  [place] fall precautions as indicated by assessment   - Educate patient/family on patient safety including physical limitations  - Instruct patient to call for assistance with activity based on assessment  - Modify environment to reduce risk of injury  - Consider OT/PT consult to assist with strengthening/mobility  Outcome: Progressing

## 2019-04-05 ENCOUNTER — TELEPHONE (OUTPATIENT)
Dept: FAMILY MEDICINE CLINIC | Facility: CLINIC | Age: 64
End: 2019-04-05

## 2019-04-10 ENCOUNTER — OFFICE VISIT (OUTPATIENT)
Dept: HEMATOLOGY ONCOLOGY | Facility: CLINIC | Age: 64
End: 2019-04-10
Payer: MEDICARE

## 2019-04-10 VITALS
DIASTOLIC BLOOD PRESSURE: 82 MMHG | SYSTOLIC BLOOD PRESSURE: 160 MMHG | HEIGHT: 72 IN | WEIGHT: 233 LBS | TEMPERATURE: 97.8 F | OXYGEN SATURATION: 98 % | HEART RATE: 71 BPM | BODY MASS INDEX: 31.56 KG/M2 | RESPIRATION RATE: 18 BRPM

## 2019-04-10 DIAGNOSIS — D75.82 HIT (HEPARIN-INDUCED THROMBOCYTOPENIA) (HCC): ICD-10-CM

## 2019-04-10 DIAGNOSIS — D80.1 HYPOGAMMAGLOBULINEMIA, ACQUIRED (HCC): ICD-10-CM

## 2019-04-10 DIAGNOSIS — D59.19 OTHER AUTOIMMUNE HEMOLYTIC ANEMIAS: ICD-10-CM

## 2019-04-10 DIAGNOSIS — D69.6 THROMBOCYTOPENIA (HCC): ICD-10-CM

## 2019-04-10 DIAGNOSIS — C91.10 CHRONIC LYMPHOCYTIC LEUKEMIA (HCC): Primary | ICD-10-CM

## 2019-04-10 PROCEDURE — 99215 OFFICE O/P EST HI 40 MIN: CPT | Performed by: INTERNAL MEDICINE

## 2019-04-15 ENCOUNTER — HOSPITAL ENCOUNTER (OUTPATIENT)
Dept: CT IMAGING | Facility: HOSPITAL | Age: 64
Discharge: HOME | End: 2019-04-15
Payer: COMMERCIAL

## 2019-04-15 DIAGNOSIS — Z87.891 HISTORY OF SMOKING 30 OR MORE PACK YEARS: ICD-10-CM

## 2019-04-19 ENCOUNTER — TELEPHONE (OUTPATIENT)
Dept: HEMATOLOGY ONCOLOGY | Facility: CLINIC | Age: 64
End: 2019-04-19

## 2019-04-19 DIAGNOSIS — C91.10 CLL (CHRONIC LYMPHOCYTIC LEUKEMIA) (HCC): ICD-10-CM

## 2019-04-19 DIAGNOSIS — C91.10 CHRONIC LYMPHOCYTIC LEUKEMIA (HCC): Primary | ICD-10-CM

## 2019-04-19 DIAGNOSIS — D80.1 HYPOGAMMAGLOBULINEMIA, ACQUIRED (HCC): ICD-10-CM

## 2019-04-19 RX ORDER — PREDNISONE 20 MG/1
20 TABLET ORAL DAILY
Qty: 60 TABLET | Refills: 1 | Status: SHIPPED | OUTPATIENT
Start: 2019-04-19 | End: 2019-08-23 | Stop reason: SDUPTHER

## 2019-04-19 RX ORDER — SODIUM CHLORIDE 9 MG/ML
20 INJECTION, SOLUTION INTRAVENOUS ONCE
Status: CANCELLED | OUTPATIENT
Start: 2019-05-21

## 2019-04-19 RX ORDER — ACETAMINOPHEN 325 MG/1
650 TABLET ORAL ONCE
Status: CANCELLED | OUTPATIENT
Start: 2019-05-21

## 2019-04-22 ENCOUNTER — TELEPHONE (OUTPATIENT)
Dept: FAMILY MEDICINE CLINIC | Facility: CLINIC | Age: 64
End: 2019-04-22

## 2019-04-22 RX ORDER — ACETAMINOPHEN 325 MG/1
650 TABLET ORAL ONCE
Status: COMPLETED | OUTPATIENT
Start: 2019-04-23 | End: 2019-04-23

## 2019-04-22 RX ORDER — SODIUM CHLORIDE 9 MG/ML
20 INJECTION, SOLUTION INTRAVENOUS CONTINUOUS
Status: DISCONTINUED | OUTPATIENT
Start: 2019-04-23 | End: 2019-04-26 | Stop reason: HOSPADM

## 2019-04-23 ENCOUNTER — HOSPITAL ENCOUNTER (OUTPATIENT)
Dept: INFUSION CENTER | Facility: CLINIC | Age: 64
Discharge: HOME | End: 2019-04-23
Payer: MEDICARE

## 2019-04-23 VITALS
DIASTOLIC BLOOD PRESSURE: 88 MMHG | TEMPERATURE: 98.2 F | SYSTOLIC BLOOD PRESSURE: 147 MMHG | WEIGHT: 231.48 LBS | RESPIRATION RATE: 20 BRPM | BODY MASS INDEX: 31.39 KG/M2 | HEART RATE: 82 BPM

## 2019-04-23 DIAGNOSIS — C91.10 CLL (CHRONIC LYMPHOCYTIC LEUKEMIA) (HCC): ICD-10-CM

## 2019-04-23 DIAGNOSIS — C91.10 CLL (CHRONIC LYMPHOCYTIC LEUKEMIA) (HCC): Primary | ICD-10-CM

## 2019-04-23 DIAGNOSIS — D80.1 HYPOGAMMAGLOBULINEMIA, ACQUIRED (HCC): ICD-10-CM

## 2019-04-23 LAB
ALBUMIN SERPL BCP-MCNC: 3.8 G/DL (ref 3.5–5.7)
ALP SERPL-CCNC: 73 U/L (ref 46–116)
ALT SERPL W P-5'-P-CCNC: 49 U/L (ref 12–78)
ANION GAP SERPL CALCULATED.3IONS-SCNC: 7 MMOL/L (ref 4–13)
AST SERPL W P-5'-P-CCNC: 29 U/L (ref 5–45)
BASOPHILS # BLD MANUAL: 0 THOUSAND/UL (ref 0–0.1)
BASOPHILS NFR MAR MANUAL: 0 % (ref 0–1)
BILIRUB DIRECT SERPL-MCNC: 0.13 MG/DL (ref 0–0.2)
BILIRUB SERPL-MCNC: 0.6 MG/DL (ref 0.2–1)
BUN SERPL-MCNC: 18 MG/DL (ref 5–25)
CALCIUM SERPL-MCNC: 9.6 MG/DL (ref 8.3–10.1)
CHLORIDE SERPL-SCNC: 107 MMOL/L (ref 98–108)
CO2 SERPL-SCNC: 29 MMOL/L (ref 21–32)
CREAT SERPL-MCNC: 1.1 MG/DL (ref 0.6–1.3)
EOSINOPHIL # BLD MANUAL: 0.16 THOUSAND/UL (ref 0–0.4)
EOSINOPHIL NFR BLD MANUAL: 3 % (ref 0–6)
ERYTHROCYTE [DISTWIDTH] IN BLOOD BY AUTOMATED COUNT: 14.6 % (ref 11.6–15.1)
GFR SERPL CREATININE-BSD FRML MDRD: 71 ML/MIN/1.73SQ M
GLUCOSE SERPL-MCNC: 219 MG/DL (ref 65–140)
HCT VFR BLD AUTO: 41.4 % (ref 36.5–49.3)
HGB BLD-MCNC: 14.4 G/DL (ref 12–17)
IGG SERPL-MCNC: 58 MG/DL (ref 700–1600)
LDH SERPL-CCNC: 205 U/L (ref 81–234)
LYMPHOCYTES # BLD AUTO: 0.73 THOUSAND/UL (ref 0.6–4.47)
LYMPHOCYTES # BLD AUTO: 14 % (ref 14–44)
MCH RBC QN AUTO: 30.8 PG (ref 26.8–34.3)
MCHC RBC AUTO-ENTMCNC: 34.8 G/DL (ref 31.4–37.4)
MCV RBC AUTO: 89 FL (ref 82–98)
MONOCYTES # BLD AUTO: 0.42 THOUSAND/UL (ref 0–1.22)
MONOCYTES NFR BLD: 8 % (ref 4–12)
NEUTROPHILS # BLD MANUAL: 3.91 THOUSAND/UL (ref 1.85–7.62)
NEUTS SEG NFR BLD AUTO: 75 % (ref 43–75)
PLATELET # BLD AUTO: 76 THOUSANDS/UL (ref 149–390)
PLATELET BLD QL SMEAR: ABNORMAL
PMV BLD AUTO: 11.5 FL (ref 8.9–12.7)
POTASSIUM SERPL-SCNC: 4.1 MMOL/L (ref 3.5–5.3)
PROT SERPL-MCNC: 5.9 G/DL (ref 6.4–8.2)
RBC # BLD AUTO: 4.67 MILLION/UL (ref 3.88–5.62)
RBC MORPH BLD: NORMAL
RETICS # AUTO: ABNORMAL 10*3/UL (ref 14356–105094)
RETICS # CALC: 2.45 % (ref 0.37–1.87)
SODIUM SERPL-SCNC: 143 MMOL/L (ref 136–145)
TOTAL CELLS COUNTED SPEC: 100
URATE SERPL-MCNC: 3.7 MG/DL (ref 4.2–8)
WBC # BLD AUTO: 5.21 THOUSAND/UL (ref 4.31–10.16)

## 2019-04-23 PROCEDURE — 82232 ASSAY OF BETA-2 PROTEIN: CPT | Performed by: INTERNAL MEDICINE

## 2019-04-23 PROCEDURE — 82248 BILIRUBIN DIRECT: CPT | Performed by: INTERNAL MEDICINE

## 2019-04-23 PROCEDURE — 84550 ASSAY OF BLOOD/URIC ACID: CPT | Performed by: INTERNAL MEDICINE

## 2019-04-23 PROCEDURE — 96415 CHEMO IV INFUSION ADDL HR: CPT

## 2019-04-23 PROCEDURE — 83615 LACTATE (LD) (LDH) ENZYME: CPT | Performed by: INTERNAL MEDICINE

## 2019-04-23 PROCEDURE — 85027 COMPLETE CBC AUTOMATED: CPT | Performed by: INTERNAL MEDICINE

## 2019-04-23 PROCEDURE — 85007 BL SMEAR W/DIFF WBC COUNT: CPT | Performed by: INTERNAL MEDICINE

## 2019-04-23 PROCEDURE — 80053 COMPREHEN METABOLIC PANEL: CPT | Performed by: INTERNAL MEDICINE

## 2019-04-23 PROCEDURE — 96413 CHEMO IV INFUSION 1 HR: CPT

## 2019-04-23 PROCEDURE — 96367 TX/PROPH/DG ADDL SEQ IV INF: CPT

## 2019-04-23 PROCEDURE — 85045 AUTOMATED RETICULOCYTE COUNT: CPT | Performed by: INTERNAL MEDICINE

## 2019-04-23 PROCEDURE — 82784 ASSAY IGA/IGD/IGG/IGM EACH: CPT | Performed by: INTERNAL MEDICINE

## 2019-04-23 RX ORDER — SODIUM CHLORIDE 9 MG/ML
20 INJECTION, SOLUTION INTRAVENOUS ONCE
Status: CANCELLED | OUTPATIENT
Start: 2019-05-21

## 2019-04-23 RX ORDER — ACETAMINOPHEN 325 MG/1
650 TABLET ORAL ONCE
Status: CANCELLED | OUTPATIENT
Start: 2019-05-21

## 2019-04-23 RX ADMIN — OBINUTUZUMAB 1000 MG: 1000 INJECTION, SOLUTION, CONCENTRATE INTRAVENOUS at 10:27

## 2019-04-23 RX ADMIN — ACETAMINOPHEN 650 MG: 325 TABLET, FILM COATED ORAL at 09:10

## 2019-04-23 RX ADMIN — DEXAMETHASONE SODIUM PHOSPHATE 20 MG: 10 INJECTION, SOLUTION INTRAMUSCULAR; INTRAVENOUS at 09:11

## 2019-04-23 RX ADMIN — SODIUM CHLORIDE 20 ML/HR: 0.9 INJECTION, SOLUTION INTRAVENOUS at 08:46

## 2019-04-23 RX ADMIN — DIPHENHYDRAMINE HYDROCHLORIDE 25 MG: 50 INJECTION INTRAMUSCULAR; INTRAVENOUS at 09:35

## 2019-04-23 NOTE — PLAN OF CARE
Problem: Potential for Falls  Goal: Patient will remain free of falls  Description  INTERVENTIONS:  - Assess patient frequently for physical needs  -  Identify cognitive and physical deficits and behaviors that affect risk of falls    -  [place] fall precautions as indicated by assessment   - Educate patient/family on patient safety including physical limitations  - Instruct patient to call for assistance with activity based on assessment  - Modify environment to reduce risk of injury  - Consider OT/PT consult to assist with strengthening/mobility  Outcome: Progressing

## 2019-04-25 LAB — B2 MICROGLOB SERPL-MCNC: 2.5 MG/L (ref 0.6–2.4)

## 2019-05-06 RX ORDER — ACETAMINOPHEN 325 MG/1
650 TABLET ORAL ONCE
Status: COMPLETED | OUTPATIENT
Start: 2019-05-07 | End: 2019-05-07

## 2019-05-06 RX ORDER — SODIUM CHLORIDE 9 MG/ML
20 INJECTION, SOLUTION INTRAVENOUS CONTINUOUS
Status: DISCONTINUED | OUTPATIENT
Start: 2019-05-07 | End: 2019-05-10 | Stop reason: HOSPADM

## 2019-05-07 ENCOUNTER — HOSPITAL ENCOUNTER (OUTPATIENT)
Dept: INFUSION CENTER | Facility: CLINIC | Age: 64
Discharge: HOME | End: 2019-05-07
Payer: MEDICARE

## 2019-05-07 ENCOUNTER — TELEPHONE (OUTPATIENT)
Dept: HEMATOLOGY ONCOLOGY | Facility: CLINIC | Age: 64
End: 2019-05-07

## 2019-05-07 VITALS
HEART RATE: 78 BPM | TEMPERATURE: 98.2 F | SYSTOLIC BLOOD PRESSURE: 145 MMHG | DIASTOLIC BLOOD PRESSURE: 86 MMHG | RESPIRATION RATE: 20 BRPM

## 2019-05-07 DIAGNOSIS — D80.1 HYPOGAMMAGLOBULINEMIA, ACQUIRED (HCC): ICD-10-CM

## 2019-05-07 DIAGNOSIS — T80.212D PORT OR RESERVOIR INFECTION, SUBSEQUENT ENCOUNTER: Primary | ICD-10-CM

## 2019-05-07 DIAGNOSIS — C91.10 CLL (CHRONIC LYMPHOCYTIC LEUKEMIA) (HCC): ICD-10-CM

## 2019-05-07 PROCEDURE — 96365 THER/PROPH/DIAG IV INF INIT: CPT

## 2019-05-07 PROCEDURE — 96367 TX/PROPH/DG ADDL SEQ IV INF: CPT

## 2019-05-07 PROCEDURE — 96366 THER/PROPH/DIAG IV INF ADDON: CPT

## 2019-05-07 PROCEDURE — 96375 TX/PRO/DX INJ NEW DRUG ADDON: CPT

## 2019-05-07 RX ADMIN — Medication 20 G: at 10:04

## 2019-05-07 RX ADMIN — DIPHENHYDRAMINE HYDROCHLORIDE 12.5 MG: 50 INJECTION INTRAMUSCULAR; INTRAVENOUS at 09:03

## 2019-05-07 RX ADMIN — ACETAMINOPHEN 650 MG: 325 TABLET, FILM COATED ORAL at 08:15

## 2019-05-07 RX ADMIN — SODIUM CHLORIDE 20 ML/HR: 0.9 INJECTION, SOLUTION INTRAVENOUS at 08:50

## 2019-05-07 RX ADMIN — HYDROCORTISONE SODIUM SUCCINATE 100 MG: 100 INJECTION, POWDER, FOR SOLUTION INTRAMUSCULAR; INTRAVENOUS at 08:58

## 2019-05-07 NOTE — PROGRESS NOTES
Pt  Denies new symptoms or concerns at this time  Port flushed with blood return; however unable to return flush  IV access removed and re accessed but still unable to flush forward  Blood return noted  Port access removed  Peripheral access obtained  IVIG ordered for infusion today  PO tylenol, IV Solumedrol and IV Benadryl given  Spoke with Stephania Henderson RN; Lb Bustos will follow up with Dr Krystal Ramesh and contact Pt  With further instruction

## 2019-05-07 NOTE — PLAN OF CARE
Problem: PAIN - ADULT  Goal: Verbalizes/displays adequate comfort level or baseline comfort level  Description  Interventions:  - Encourage patient to monitor pain and request assistance  - Assess pain using appropriate pain scale  - Administer analgesics based on type and severity of pain and evaluate response  - Implement non-pharmacological measures as appropriate and evaluate response  - Consider cultural and social influences on pain and pain management  - Notify physician/advanced practitioner if interventions unsuccessful or patient reports new pain  Outcome: Progressing     Problem: INFECTION - ADULT  Goal: Absence or prevention of progression during hospitalization  Description  INTERVENTIONS:  - Assess and monitor for signs and symptoms of infection  - Monitor lab/diagnostic results  - Monitor all insertion sites, i e  indwelling lines, tubes, and drains  - Monitor endotracheal (as able) and nasal secretions for changes in amount and color  - [place] appropriate cooling/warming therapies per order  - Administer medications as ordered  - Instruct and encourage patient and family to use good hand hygiene technique  - Identify and instruct in appropriate isolation precautions for identified infection/condition  Outcome: Progressing  Goal: Absence of fever/infection during neutropenic period  Description  INTERVENTIONS:  - Monitor WBC  - Implement neutropenic guidelines  Outcome: Progressing     Problem: Knowledge Deficit  Goal: Patient/family/caregiver demonstrates understanding of disease process, treatment plan, medications, and discharge instructions  Description  Complete learning assessment and assess knowledge base    Interventions:  - Provide teaching at level of understanding  - Provide teaching via preferred learning methods  Outcome: Progressing

## 2019-05-08 ENCOUNTER — TELEPHONE (OUTPATIENT)
Dept: HEMATOLOGY ONCOLOGY | Facility: CLINIC | Age: 64
End: 2019-05-08

## 2019-05-09 LAB
LEFT EYE DIABETIC RETINOPATHY: NORMAL
RIGHT EYE DIABETIC RETINOPATHY: NORMAL

## 2019-05-16 DIAGNOSIS — D59.9 ACQUIRED HEMOLYTIC ANEMIA (HCC): ICD-10-CM

## 2019-05-16 RX ORDER — PREDNISONE 10 MG/1
TABLET ORAL
Qty: 30 TABLET | Refills: 2 | Status: SHIPPED | OUTPATIENT
Start: 2019-05-16 | End: 2019-08-23 | Stop reason: SDUPTHER

## 2019-05-17 ENCOUNTER — HOSPITAL ENCOUNTER (OUTPATIENT)
Dept: RADIOLOGY | Facility: HOSPITAL | Age: 64
Discharge: HOME | End: 2019-05-17
Attending: INTERNAL MEDICINE
Payer: MEDICARE

## 2019-05-17 DIAGNOSIS — C91.10 CLL (CHRONIC LYMPHOCYTIC LEUKEMIA) (HCC): ICD-10-CM

## 2019-05-17 DIAGNOSIS — T80.212D PORT OR RESERVOIR INFECTION, SUBSEQUENT ENCOUNTER: ICD-10-CM

## 2019-05-17 DIAGNOSIS — D80.1 HYPOGAMMAGLOBULINEMIA, ACQUIRED (HCC): ICD-10-CM

## 2019-05-17 PROCEDURE — 36593 DECLOT VASCULAR DEVICE: CPT

## 2019-05-17 PROCEDURE — 36598 INJ W/FLUOR EVAL CV DEVICE: CPT | Performed by: RADIOLOGY

## 2019-05-17 PROCEDURE — 36598 INJ W/FLUOR EVAL CV DEVICE: CPT

## 2019-05-17 RX ADMIN — ALTEPLASE: 2.2 INJECTION, POWDER, LYOPHILIZED, FOR SOLUTION INTRAVENOUS at 11:22

## 2019-05-17 RX ADMIN — IOHEXOL 20 ML: 300 INJECTION, SOLUTION INTRAVENOUS at 11:22

## 2019-05-20 DIAGNOSIS — C91.10 CHRONIC LYMPHATIC LEUKEMIA (HCC): ICD-10-CM

## 2019-05-20 DIAGNOSIS — D80.1 HYPOGAMMAGLOBULINEMIA, ACQUIRED (HCC): ICD-10-CM

## 2019-05-20 DIAGNOSIS — C91.10 CLL (CHRONIC LYMPHOCYTIC LEUKEMIA) (HCC): ICD-10-CM

## 2019-05-20 RX ORDER — SODIUM CHLORIDE 9 MG/ML
20 INJECTION, SOLUTION INTRAVENOUS ONCE
Status: CANCELLED | OUTPATIENT
Start: 2019-06-04

## 2019-05-20 RX ORDER — ACETAMINOPHEN 325 MG/1
650 TABLET ORAL ONCE
Status: CANCELLED | OUTPATIENT
Start: 2019-06-04

## 2019-05-21 ENCOUNTER — HOSPITAL ENCOUNTER (OUTPATIENT)
Dept: INFUSION CENTER | Facility: CLINIC | Age: 64
Discharge: HOME | End: 2019-05-21
Payer: MEDICARE

## 2019-05-21 VITALS
TEMPERATURE: 98 F | HEIGHT: 72 IN | BODY MASS INDEX: 31.53 KG/M2 | SYSTOLIC BLOOD PRESSURE: 138 MMHG | RESPIRATION RATE: 16 BRPM | DIASTOLIC BLOOD PRESSURE: 83 MMHG | HEART RATE: 69 BPM | WEIGHT: 232.81 LBS

## 2019-05-21 DIAGNOSIS — C91.10 CLL (CHRONIC LYMPHOCYTIC LEUKEMIA) (HCC): Primary | ICD-10-CM

## 2019-05-21 PROCEDURE — 96415 CHEMO IV INFUSION ADDL HR: CPT

## 2019-05-21 PROCEDURE — 96413 CHEMO IV INFUSION 1 HR: CPT

## 2019-05-21 PROCEDURE — 96367 TX/PROPH/DG ADDL SEQ IV INF: CPT

## 2019-05-21 RX ORDER — ACETAMINOPHEN 325 MG/1
650 TABLET ORAL ONCE
Status: COMPLETED | OUTPATIENT
Start: 2019-05-21 | End: 2019-05-21

## 2019-05-21 RX ORDER — SODIUM CHLORIDE 9 MG/ML
20 INJECTION, SOLUTION INTRAVENOUS ONCE
Status: COMPLETED | OUTPATIENT
Start: 2019-05-21 | End: 2019-05-21

## 2019-05-21 RX ADMIN — ACETAMINOPHEN 650 MG: 325 TABLET, FILM COATED ORAL at 09:26

## 2019-05-21 RX ADMIN — OBINUTUZUMAB 1000 MG: 1000 INJECTION, SOLUTION, CONCENTRATE INTRAVENOUS at 10:28

## 2019-05-21 RX ADMIN — DEXAMETHASONE SODIUM PHOSPHATE 20 MG: 10 INJECTION, SOLUTION INTRAMUSCULAR; INTRAVENOUS at 09:20

## 2019-05-21 RX ADMIN — DIPHENHYDRAMINE HYDROCHLORIDE 25 MG: 50 INJECTION INTRAMUSCULAR; INTRAVENOUS at 09:55

## 2019-05-21 RX ADMIN — SODIUM CHLORIDE 20 ML/HR: 0.9 INJECTION, SOLUTION INTRAVENOUS at 08:30

## 2019-05-21 NOTE — PROGRESS NOTES
Pt  Denies new symptoms or concerns at this time  Labs obtained as ordered  Excellent blood return noted from port a cath

## 2019-05-21 NOTE — PROGRESS NOTES
Labs were reviewed and within normal parameters  Pt  Tolerate Gazyva without adverse event  Future appointments reviewed    Declined AVS

## 2019-05-21 NOTE — PLAN OF CARE
Problem: PAIN - ADULT  Goal: Verbalizes/displays adequate comfort level or baseline comfort level  Description  Interventions:  - Encourage patient to monitor pain and request assistance  - Assess pain using appropriate pain scale  - Administer analgesics based on type and severity of pain and evaluate response  - Implement non-pharmacological measures as appropriate and evaluate response  - Consider cultural and social influences on pain and pain management  - Notify physician/advanced practitioner if interventions unsuccessful or patient reports new pain  Outcome: Progressing     Problem: INFECTION - ADULT  Goal: Absence or prevention of progression during hospitalization  Description  INTERVENTIONS:  - Assess and monitor for signs and symptoms of infection  - Monitor lab/diagnostic results  - Monitor all insertion sites, i e  indwelling lines, tubes, and drains  - Monitor endotracheal (as able) and nasal secretions for changes in amount and color  - [place] appropriate cooling/warming therapies per order  - Administer medications as ordered  - Instruct and encourage patient and family to use good hand hygiene technique  - Identify and instruct in appropriate isolation precautions for identified infection/condition  Outcome: Progressing  Goal: Absence of fever/infection during neutropenic period  Description  INTERVENTIONS:  - Monitor WBC  - Implement neutropenic guidelines  Outcome: Progressing     Problem: Knowledge Deficit  Goal: Patient/family/caregiver demonstrates understanding of disease process, treatment plan, medications, and discharge instructions  Description  Complete learning assessment and assess knowledge base    Interventions:  - Provide teaching at level of understanding  - Provide teaching via preferred learning methods  Outcome: Progressing

## 2019-05-24 DIAGNOSIS — G62.9 NEUROPATHY: ICD-10-CM

## 2019-05-24 RX ORDER — GABAPENTIN 600 MG/1
600 TABLET ORAL DAILY
Qty: 30 TABLET | Refills: 2 | Status: SHIPPED | OUTPATIENT
Start: 2019-05-24 | End: 2019-12-05 | Stop reason: SDUPTHER

## 2019-05-31 RX ORDER — ACETAMINOPHEN 325 MG/1
650 TABLET ORAL ONCE
Status: CANCELLED | OUTPATIENT
Start: 2019-06-04

## 2019-05-31 RX ORDER — SODIUM CHLORIDE 9 MG/ML
20 INJECTION, SOLUTION INTRAVENOUS ONCE
Status: CANCELLED | OUTPATIENT
Start: 2019-06-04

## 2019-06-03 ENCOUNTER — OFFICE VISIT (OUTPATIENT)
Dept: ENDOCRINOLOGY | Facility: CLINIC | Age: 64
End: 2019-06-03
Payer: MEDICARE

## 2019-06-03 VITALS
BODY MASS INDEX: 31.69 KG/M2 | WEIGHT: 234 LBS | HEART RATE: 94 BPM | DIASTOLIC BLOOD PRESSURE: 80 MMHG | HEIGHT: 72 IN | SYSTOLIC BLOOD PRESSURE: 158 MMHG

## 2019-06-03 DIAGNOSIS — E09.9 STEROID-INDUCED DIABETES (HCC): Primary | ICD-10-CM

## 2019-06-03 DIAGNOSIS — I10 ESSENTIAL HYPERTENSION: ICD-10-CM

## 2019-06-03 DIAGNOSIS — E78.01 FAMILIAL HYPERCHOLESTEROLEMIA: ICD-10-CM

## 2019-06-03 DIAGNOSIS — T38.0X5A STEROID-INDUCED DIABETES (HCC): Primary | ICD-10-CM

## 2019-06-03 PROCEDURE — 99214 OFFICE O/P EST MOD 30 MIN: CPT | Performed by: PHYSICIAN ASSISTANT

## 2019-06-03 RX ORDER — LOSARTAN POTASSIUM 50 MG/1
50 TABLET ORAL DAILY
Qty: 90 TABLET | Refills: 3 | Status: SHIPPED | OUTPATIENT
Start: 2019-06-03 | End: 2019-10-07

## 2019-06-04 ENCOUNTER — HOSPITAL ENCOUNTER (OUTPATIENT)
Dept: INFUSION CENTER | Facility: CLINIC | Age: 64
Discharge: HOME | End: 2019-06-04
Payer: MEDICARE

## 2019-06-04 VITALS
SYSTOLIC BLOOD PRESSURE: 161 MMHG | RESPIRATION RATE: 16 BRPM | HEART RATE: 67 BPM | DIASTOLIC BLOOD PRESSURE: 88 MMHG | TEMPERATURE: 97.6 F

## 2019-06-04 DIAGNOSIS — D80.1 HYPOGAMMAGLOBULINEMIA, ACQUIRED (HCC): ICD-10-CM

## 2019-06-04 DIAGNOSIS — C91.10 CLL (CHRONIC LYMPHOCYTIC LEUKEMIA) (HCC): Primary | ICD-10-CM

## 2019-06-04 PROCEDURE — 96365 THER/PROPH/DIAG IV INF INIT: CPT

## 2019-06-04 PROCEDURE — 96367 TX/PROPH/DG ADDL SEQ IV INF: CPT

## 2019-06-04 PROCEDURE — 96375 TX/PRO/DX INJ NEW DRUG ADDON: CPT

## 2019-06-04 PROCEDURE — 96366 THER/PROPH/DIAG IV INF ADDON: CPT

## 2019-06-04 RX ORDER — ACETAMINOPHEN 325 MG/1
650 TABLET ORAL ONCE
Status: COMPLETED | OUTPATIENT
Start: 2019-06-04 | End: 2019-06-04

## 2019-06-04 RX ORDER — SODIUM CHLORIDE 9 MG/ML
20 INJECTION, SOLUTION INTRAVENOUS ONCE
Status: COMPLETED | OUTPATIENT
Start: 2019-06-04 | End: 2019-06-04

## 2019-06-04 RX ORDER — ACETAMINOPHEN 325 MG/1
650 TABLET ORAL ONCE
Status: CANCELLED | OUTPATIENT
Start: 2019-07-02

## 2019-06-04 RX ORDER — SODIUM CHLORIDE 9 MG/ML
20 INJECTION, SOLUTION INTRAVENOUS ONCE
Status: CANCELLED | OUTPATIENT
Start: 2019-07-02

## 2019-06-04 RX ADMIN — DIPHENHYDRAMINE HYDROCHLORIDE 12.5 MG: 50 INJECTION INTRAMUSCULAR; INTRAVENOUS at 08:25

## 2019-06-04 RX ADMIN — ACETAMINOPHEN 650 MG: 325 TABLET, FILM COATED ORAL at 08:16

## 2019-06-04 RX ADMIN — HYDROCORTISONE SODIUM SUCCINATE 100 MG: 100 INJECTION, POWDER, FOR SOLUTION INTRAMUSCULAR; INTRAVENOUS at 08:22

## 2019-06-04 RX ADMIN — Medication 20 G: at 08:48

## 2019-06-04 RX ADMIN — SODIUM CHLORIDE 20 ML/HR: 0.9 INJECTION, SOLUTION INTRAVENOUS at 08:20

## 2019-06-11 DIAGNOSIS — C91.10 CLL (CHRONIC LYMPHOCYTIC LEUKEMIA) (HCC): ICD-10-CM

## 2019-06-11 RX ORDER — ACETAMINOPHEN 325 MG/1
650 TABLET ORAL ONCE
Status: CANCELLED | OUTPATIENT
Start: 2019-06-18

## 2019-06-11 RX ORDER — SODIUM CHLORIDE 9 MG/ML
20 INJECTION, SOLUTION INTRAVENOUS ONCE
Status: CANCELLED | OUTPATIENT
Start: 2019-06-18

## 2019-06-12 ENCOUNTER — OFFICE VISIT (OUTPATIENT)
Dept: CARDIOLOGY CLINIC | Facility: CLINIC | Age: 64
End: 2019-06-12
Payer: MEDICARE

## 2019-06-12 VITALS
HEART RATE: 75 BPM | SYSTOLIC BLOOD PRESSURE: 135 MMHG | WEIGHT: 228.6 LBS | BODY MASS INDEX: 30.96 KG/M2 | HEIGHT: 72 IN | RESPIRATION RATE: 18 BRPM | DIASTOLIC BLOOD PRESSURE: 80 MMHG

## 2019-06-12 DIAGNOSIS — E78.2 MIXED HYPERLIPIDEMIA: ICD-10-CM

## 2019-06-12 DIAGNOSIS — I25.10 CORONARY ARTERY DISEASE INVOLVING NATIVE CORONARY ARTERY OF NATIVE HEART WITHOUT ANGINA PECTORIS: Primary | ICD-10-CM

## 2019-06-12 DIAGNOSIS — I10 ESSENTIAL HYPERTENSION: ICD-10-CM

## 2019-06-12 PROCEDURE — 99213 OFFICE O/P EST LOW 20 MIN: CPT | Performed by: INTERNAL MEDICINE

## 2019-06-18 ENCOUNTER — HOSPITAL ENCOUNTER (OUTPATIENT)
Dept: INFUSION CENTER | Facility: CLINIC | Age: 64
Discharge: HOME | End: 2019-06-18
Payer: MEDICARE

## 2019-06-18 ENCOUNTER — APPOINTMENT (OUTPATIENT)
Dept: LAB | Facility: CLINIC | Age: 64
End: 2019-06-18
Payer: MEDICARE

## 2019-06-18 VITALS
BODY MASS INDEX: 31.39 KG/M2 | RESPIRATION RATE: 16 BRPM | TEMPERATURE: 98.6 F | HEART RATE: 70 BPM | SYSTOLIC BLOOD PRESSURE: 151 MMHG | DIASTOLIC BLOOD PRESSURE: 88 MMHG | WEIGHT: 231.48 LBS

## 2019-06-18 DIAGNOSIS — I25.10 CORONARY ARTERY DISEASE INVOLVING NATIVE CORONARY ARTERY OF NATIVE HEART WITHOUT ANGINA PECTORIS: ICD-10-CM

## 2019-06-18 DIAGNOSIS — C91.10 CLL (CHRONIC LYMPHOCYTIC LEUKEMIA) (HCC): Primary | ICD-10-CM

## 2019-06-18 DIAGNOSIS — E78.2 MIXED HYPERLIPIDEMIA: ICD-10-CM

## 2019-06-18 LAB
CHOLEST SERPL-MCNC: 189 MG/DL (ref 50–200)
HDLC SERPL-MCNC: 33 MG/DL (ref 40–60)
LDLC SERPL CALC-MCNC: 129 MG/DL (ref 0–100)
TRIGL SERPL-MCNC: 135 MG/DL
TSH SERPL DL<=0.05 MIU/L-ACNC: 0.97 UIU/ML (ref 0.36–3.74)

## 2019-06-18 PROCEDURE — 84443 ASSAY THYROID STIM HORMONE: CPT | Performed by: INTERNAL MEDICINE

## 2019-06-18 PROCEDURE — 96367 TX/PROPH/DG ADDL SEQ IV INF: CPT

## 2019-06-18 PROCEDURE — 96413 CHEMO IV INFUSION 1 HR: CPT

## 2019-06-18 PROCEDURE — 96415 CHEMO IV INFUSION ADDL HR: CPT

## 2019-06-18 PROCEDURE — 80061 LIPID PANEL: CPT

## 2019-06-18 RX ORDER — ACETAMINOPHEN 325 MG/1
650 TABLET ORAL ONCE
Status: COMPLETED | OUTPATIENT
Start: 2019-06-18 | End: 2019-06-18

## 2019-06-18 RX ORDER — SODIUM CHLORIDE 9 MG/ML
20 INJECTION, SOLUTION INTRAVENOUS ONCE
Status: COMPLETED | OUTPATIENT
Start: 2019-06-18 | End: 2019-06-18

## 2019-06-18 RX ADMIN — DIPHENHYDRAMINE HYDROCHLORIDE 25 MG: 50 INJECTION INTRAMUSCULAR; INTRAVENOUS at 08:51

## 2019-06-18 RX ADMIN — SODIUM CHLORIDE 20 ML/HR: 0.9 INJECTION, SOLUTION INTRAVENOUS at 08:35

## 2019-06-18 RX ADMIN — DEXAMETHASONE SODIUM PHOSPHATE 20 MG: 10 INJECTION, SOLUTION INTRAMUSCULAR; INTRAVENOUS at 08:35

## 2019-06-18 RX ADMIN — ACETAMINOPHEN 650 MG: 325 TABLET, FILM COATED ORAL at 08:41

## 2019-06-18 RX ADMIN — OBINUTUZUMAB 1000 MG: 1000 INJECTION, SOLUTION, CONCENTRATE INTRAVENOUS at 09:19

## 2019-06-18 NOTE — PLAN OF CARE
Problem: Potential for Falls  Goal: Patient will remain free of falls  Description  INTERVENTIONS:  - Assess patient frequently for physical needs  -  Identify cognitive and physical deficits and behaviors that affect risk of falls    -  [place] fall precautions as indicated by assessment   - Educate patient/family on patient safety including physical limitations  - Instruct patient to call for assistance with activity based on assessment  - Modify environment to reduce risk of injury  - Consider OT/PT consult to assist with strengthening/mobility  Outcome: Progressing

## 2019-06-18 NOTE — PROGRESS NOTES
Patient tolerated Gazyva infusion well  Offers no complaints  Pt is aware of all future appointments   Refused AVS

## 2019-06-19 ENCOUNTER — OFFICE VISIT (OUTPATIENT)
Dept: HEMATOLOGY ONCOLOGY | Facility: CLINIC | Age: 64
End: 2019-06-19
Payer: MEDICARE

## 2019-06-19 VITALS
SYSTOLIC BLOOD PRESSURE: 140 MMHG | TEMPERATURE: 99 F | WEIGHT: 235 LBS | HEIGHT: 73 IN | DIASTOLIC BLOOD PRESSURE: 70 MMHG | OXYGEN SATURATION: 98 % | RESPIRATION RATE: 16 BRPM | HEART RATE: 75 BPM | BODY MASS INDEX: 31.14 KG/M2

## 2019-06-19 DIAGNOSIS — D75.82 HIT (HEPARIN-INDUCED THROMBOCYTOPENIA) (HCC): ICD-10-CM

## 2019-06-19 DIAGNOSIS — R10.11 RIGHT UPPER QUADRANT ABDOMINAL PAIN: ICD-10-CM

## 2019-06-19 DIAGNOSIS — D69.6 THROMBOCYTOPENIA (HCC): ICD-10-CM

## 2019-06-19 DIAGNOSIS — C91.10 CLL (CHRONIC LYMPHOCYTIC LEUKEMIA) (HCC): ICD-10-CM

## 2019-06-19 DIAGNOSIS — D80.1 HYPOGAMMAGLOBULINEMIA, ACQUIRED (HCC): ICD-10-CM

## 2019-06-19 DIAGNOSIS — C91.10 CHRONIC LYMPHOCYTIC LEUKEMIA (HCC): Primary | ICD-10-CM

## 2019-06-19 DIAGNOSIS — J41.0 SIMPLE CHRONIC BRONCHITIS (HCC): ICD-10-CM

## 2019-06-19 DIAGNOSIS — D59.10 AUTOIMMUNE HEMOLYTIC ANEMIA (HCC): ICD-10-CM

## 2019-06-19 PROCEDURE — 99214 OFFICE O/P EST MOD 30 MIN: CPT | Performed by: INTERNAL MEDICINE

## 2019-06-19 RX ORDER — SODIUM CHLORIDE 9 MG/ML
20 INJECTION, SOLUTION INTRAVENOUS ONCE
Status: CANCELLED | OUTPATIENT
Start: 2019-07-16

## 2019-06-19 RX ORDER — ACETAMINOPHEN 325 MG/1
650 TABLET ORAL ONCE
Status: CANCELLED | OUTPATIENT
Start: 2019-07-16

## 2019-06-27 ENCOUNTER — HOSPITAL ENCOUNTER (OUTPATIENT)
Dept: CT IMAGING | Facility: HOSPITAL | Age: 64
Discharge: HOME | End: 2019-06-27
Attending: INTERNAL MEDICINE
Payer: MEDICARE

## 2019-06-27 DIAGNOSIS — C91.10 CLL (CHRONIC LYMPHOCYTIC LEUKEMIA) (HCC): ICD-10-CM

## 2019-06-27 DIAGNOSIS — J41.0 SIMPLE CHRONIC BRONCHITIS (HCC): ICD-10-CM

## 2019-06-27 DIAGNOSIS — R10.11 RIGHT UPPER QUADRANT ABDOMINAL PAIN: ICD-10-CM

## 2019-06-27 PROCEDURE — 74177 CT ABD & PELVIS W/CONTRAST: CPT

## 2019-06-27 PROCEDURE — 71260 CT THORAX DX C+: CPT

## 2019-06-27 RX ADMIN — IOHEXOL 100 ML: 350 INJECTION, SOLUTION INTRAVENOUS at 17:36

## 2019-06-28 ENCOUNTER — TELEPHONE (OUTPATIENT)
Dept: CARDIOLOGY CLINIC | Facility: CLINIC | Age: 64
End: 2019-06-28

## 2019-07-01 ENCOUNTER — TELEPHONE (OUTPATIENT)
Dept: HEMATOLOGY ONCOLOGY | Facility: CLINIC | Age: 64
End: 2019-07-01

## 2019-07-01 NOTE — TELEPHONE ENCOUNTER
LM for pt stating that his CT scan is not read yet  I let pt know that it does take a few days to read these tests  I asked pt to call the office back in a few days if he doesn't hear anything from our office

## 2019-07-02 ENCOUNTER — HOSPITAL ENCOUNTER (OUTPATIENT)
Dept: INFUSION CENTER | Facility: CLINIC | Age: 64
Discharge: HOME | End: 2019-07-02
Payer: MEDICARE

## 2019-07-02 VITALS
TEMPERATURE: 98.4 F | DIASTOLIC BLOOD PRESSURE: 91 MMHG | SYSTOLIC BLOOD PRESSURE: 151 MMHG | RESPIRATION RATE: 16 BRPM | HEART RATE: 72 BPM

## 2019-07-02 DIAGNOSIS — D80.1 HYPOGAMMAGLOBULINEMIA, ACQUIRED (HCC): ICD-10-CM

## 2019-07-02 DIAGNOSIS — C91.10 CLL (CHRONIC LYMPHOCYTIC LEUKEMIA) (HCC): Primary | ICD-10-CM

## 2019-07-02 PROCEDURE — 96375 TX/PRO/DX INJ NEW DRUG ADDON: CPT

## 2019-07-02 PROCEDURE — 96365 THER/PROPH/DIAG IV INF INIT: CPT

## 2019-07-02 PROCEDURE — 96367 TX/PROPH/DG ADDL SEQ IV INF: CPT

## 2019-07-02 PROCEDURE — 96366 THER/PROPH/DIAG IV INF ADDON: CPT

## 2019-07-02 RX ORDER — ACETAMINOPHEN 325 MG/1
650 TABLET ORAL ONCE
Status: COMPLETED | OUTPATIENT
Start: 2019-07-02 | End: 2019-07-02

## 2019-07-02 RX ORDER — SODIUM CHLORIDE 9 MG/ML
20 INJECTION, SOLUTION INTRAVENOUS ONCE
Status: COMPLETED | OUTPATIENT
Start: 2019-07-02 | End: 2019-07-02

## 2019-07-02 RX ORDER — SODIUM CHLORIDE 9 MG/ML
20 INJECTION, SOLUTION INTRAVENOUS ONCE
Status: CANCELLED | OUTPATIENT
Start: 2019-07-30

## 2019-07-02 RX ORDER — ACETAMINOPHEN 325 MG/1
650 TABLET ORAL ONCE
Status: CANCELLED | OUTPATIENT
Start: 2019-07-30

## 2019-07-02 RX ADMIN — ACETAMINOPHEN 650 MG: 325 TABLET, FILM COATED ORAL at 08:26

## 2019-07-02 RX ADMIN — Medication 22.5 G: at 08:56

## 2019-07-02 RX ADMIN — HYDROCORTISONE SODIUM SUCCINATE 100 MG: 100 INJECTION, POWDER, FOR SOLUTION INTRAMUSCULAR; INTRAVENOUS at 08:26

## 2019-07-02 RX ADMIN — SODIUM CHLORIDE 20 ML/HR: 0.9 INJECTION, SOLUTION INTRAVENOUS at 08:26

## 2019-07-02 RX ADMIN — DIPHENHYDRAMINE HYDROCHLORIDE 12.5 MG: 50 INJECTION, SOLUTION INTRAMUSCULAR; INTRAVENOUS at 08:36

## 2019-07-02 NOTE — PLAN OF CARE
Problem: Potential for Falls  Goal: Patient will remain free of falls  Description  INTERVENTIONS:  - Assess patient frequently for physical needs  -  Identify cognitive and physical deficits and behaviors that affect risk of falls    -  [place] fall precautions as indicated by assessment   - Educate patient/family on patient safety including physical limitations  - Instruct patient to call for assistance with activity based on assessment  - Modify environment to reduce risk of injury  - Consider OT/PT consult to assist with strengthening/mobility  Outcome: Progressing

## 2019-07-02 NOTE — PROGRESS NOTES
Patient tolerated IVIG infusion well  Offers no complaints  Pt is aware of all future appiontments   Refused AVS

## 2019-07-16 ENCOUNTER — HOSPITAL ENCOUNTER (OUTPATIENT)
Dept: INFUSION CENTER | Facility: CLINIC | Age: 64
Discharge: HOME | End: 2019-07-16
Payer: MEDICARE

## 2019-07-16 VITALS
DIASTOLIC BLOOD PRESSURE: 81 MMHG | HEIGHT: 73 IN | BODY MASS INDEX: 31.15 KG/M2 | SYSTOLIC BLOOD PRESSURE: 159 MMHG | TEMPERATURE: 97.3 F | WEIGHT: 235.01 LBS | HEART RATE: 79 BPM | RESPIRATION RATE: 18 BRPM

## 2019-07-16 DIAGNOSIS — C91.10 CLL (CHRONIC LYMPHOCYTIC LEUKEMIA) (HCC): Primary | ICD-10-CM

## 2019-07-16 DIAGNOSIS — D80.1 HYPOGAMMAGLOBULINEMIA, ACQUIRED (HCC): ICD-10-CM

## 2019-07-16 LAB
ALBUMIN SERPL BCP-MCNC: 3.6 G/DL (ref 3.5–5.7)
ALP SERPL-CCNC: 45 U/L (ref 46–116)
ALT SERPL W P-5'-P-CCNC: 42 U/L (ref 12–78)
ANION GAP SERPL CALCULATED.3IONS-SCNC: 4 MMOL/L (ref 4–13)
AST SERPL W P-5'-P-CCNC: 34 U/L (ref 5–45)
BASOPHILS # BLD MANUAL: 0 THOUSAND/UL (ref 0–0.1)
BASOPHILS NFR MAR MANUAL: 0 % (ref 0–1)
BILIRUB SERPL-MCNC: 0.7 MG/DL (ref 0.2–1)
BUN SERPL-MCNC: 19 MG/DL (ref 5–25)
CALCIUM SERPL-MCNC: 9.4 MG/DL (ref 8.3–10.1)
CHLORIDE SERPL-SCNC: 106 MMOL/L (ref 98–108)
CO2 SERPL-SCNC: 29 MMOL/L (ref 21–32)
CREAT SERPL-MCNC: 1.2 MG/DL (ref 0.6–1.3)
EOSINOPHIL # BLD MANUAL: 0.08 THOUSAND/UL (ref 0–0.4)
EOSINOPHIL NFR BLD MANUAL: 2 % (ref 0–6)
ERYTHROCYTE [DISTWIDTH] IN BLOOD BY AUTOMATED COUNT: 14 % (ref 11.6–15.1)
GFR SERPL CREATININE-BSD FRML MDRD: 64 ML/MIN/1.73SQ M
GLUCOSE SERPL-MCNC: 144 MG/DL (ref 65–140)
HCT VFR BLD AUTO: 40.8 % (ref 36.5–49.3)
HGB BLD-MCNC: 14.1 G/DL (ref 12–17)
HGB RETIC QN AUTO: 33.8 PG (ref 30–38.3)
IGG SERPL-MCNC: 459 MG/DL (ref 700–1600)
IMM RETICS NFR: 11.8 % (ref 0–14)
LDH SERPL-CCNC: 229 U/L (ref 81–234)
LYMPHOCYTES # BLD AUTO: 0.31 THOUSAND/UL (ref 0.6–4.47)
LYMPHOCYTES # BLD AUTO: 8 % (ref 14–44)
MCH RBC QN AUTO: 31.2 PG (ref 26.8–34.3)
MCHC RBC AUTO-ENTMCNC: 34.6 G/DL (ref 31.4–37.4)
MCV RBC AUTO: 90 FL (ref 82–98)
MONOCYTES # BLD AUTO: 0.34 THOUSAND/UL (ref 0–1.22)
MONOCYTES NFR BLD: 9 % (ref 4–12)
NEUTROPHILS # BLD MANUAL: 3.1 THOUSAND/UL (ref 1.85–7.62)
NEUTS BAND NFR BLD MANUAL: 1 % (ref 0–8)
NEUTS SEG NFR BLD AUTO: 80 % (ref 43–75)
PLATELET # BLD AUTO: 113 THOUSANDS/UL (ref 149–390)
PLATELET BLD QL SMEAR: ABNORMAL
PMV BLD AUTO: 11.4 FL (ref 8.9–12.7)
POTASSIUM SERPL-SCNC: 3.8 MMOL/L (ref 3.5–5.3)
PROT SERPL-MCNC: 6 G/DL (ref 6.4–8.2)
RBC # BLD AUTO: 4.52 MILLION/UL (ref 3.88–5.62)
RBC MORPH BLD: NORMAL
RETICS # AUTO: ABNORMAL 10*3/UL (ref 14356–105094)
RETICS # CALC: 2.34 % (ref 0.37–1.87)
SODIUM SERPL-SCNC: 139 MMOL/L (ref 136–145)
TOTAL CELLS COUNTED SPEC: 100
URATE SERPL-MCNC: 4.9 MG/DL (ref 4.2–8)
WBC # BLD AUTO: 3.83 THOUSAND/UL (ref 4.31–10.16)

## 2019-07-16 PROCEDURE — 80053 COMPREHEN METABOLIC PANEL: CPT | Performed by: INTERNAL MEDICINE

## 2019-07-16 PROCEDURE — 96367 TX/PROPH/DG ADDL SEQ IV INF: CPT

## 2019-07-16 PROCEDURE — 85007 BL SMEAR W/DIFF WBC COUNT: CPT | Performed by: INTERNAL MEDICINE

## 2019-07-16 PROCEDURE — 96415 CHEMO IV INFUSION ADDL HR: CPT

## 2019-07-16 PROCEDURE — 82232 ASSAY OF BETA-2 PROTEIN: CPT | Performed by: INTERNAL MEDICINE

## 2019-07-16 PROCEDURE — 96413 CHEMO IV INFUSION 1 HR: CPT

## 2019-07-16 PROCEDURE — 84550 ASSAY OF BLOOD/URIC ACID: CPT | Performed by: INTERNAL MEDICINE

## 2019-07-16 PROCEDURE — 85027 COMPLETE CBC AUTOMATED: CPT | Performed by: INTERNAL MEDICINE

## 2019-07-16 PROCEDURE — 82784 ASSAY IGA/IGD/IGG/IGM EACH: CPT | Performed by: INTERNAL MEDICINE

## 2019-07-16 PROCEDURE — 85046 RETICYTE/HGB CONCENTRATE: CPT | Performed by: INTERNAL MEDICINE

## 2019-07-16 PROCEDURE — 83615 LACTATE (LD) (LDH) ENZYME: CPT | Performed by: INTERNAL MEDICINE

## 2019-07-16 RX ORDER — ACETAMINOPHEN 325 MG/1
650 TABLET ORAL ONCE
Status: COMPLETED | OUTPATIENT
Start: 2019-07-16 | End: 2019-07-16

## 2019-07-16 RX ORDER — SODIUM CHLORIDE 9 MG/ML
20 INJECTION, SOLUTION INTRAVENOUS ONCE
Status: COMPLETED | OUTPATIENT
Start: 2019-07-16 | End: 2019-07-16

## 2019-07-16 RX ADMIN — ACETAMINOPHEN 650 MG: 325 TABLET, FILM COATED ORAL at 09:12

## 2019-07-16 RX ADMIN — SODIUM CHLORIDE 20 ML/HR: 0.9 INJECTION, SOLUTION INTRAVENOUS at 09:07

## 2019-07-16 RX ADMIN — OBINUTUZUMAB 1000 MG: 1000 INJECTION, SOLUTION, CONCENTRATE INTRAVENOUS at 10:18

## 2019-07-16 RX ADMIN — DEXAMETHASONE SODIUM PHOSPHATE 20 MG: 10 INJECTION, SOLUTION INTRAMUSCULAR; INTRAVENOUS at 09:07

## 2019-07-16 RX ADMIN — DIPHENHYDRAMINE HYDROCHLORIDE 25 MG: 50 INJECTION, SOLUTION INTRAMUSCULAR; INTRAVENOUS at 09:31

## 2019-07-17 LAB — B2 MICROGLOB SERPL-MCNC: 2 MG/L (ref 0.6–2.4)

## 2019-07-30 ENCOUNTER — HOSPITAL ENCOUNTER (OUTPATIENT)
Dept: INFUSION CENTER | Facility: CLINIC | Age: 64
Discharge: HOME | End: 2019-07-30
Payer: MEDICARE

## 2019-07-30 VITALS
DIASTOLIC BLOOD PRESSURE: 81 MMHG | TEMPERATURE: 99 F | SYSTOLIC BLOOD PRESSURE: 158 MMHG | RESPIRATION RATE: 18 BRPM | HEART RATE: 68 BPM

## 2019-07-30 DIAGNOSIS — D80.1 HYPOGAMMAGLOBULINEMIA, ACQUIRED (HCC): ICD-10-CM

## 2019-07-30 DIAGNOSIS — C91.10 CLL (CHRONIC LYMPHOCYTIC LEUKEMIA) (HCC): Primary | ICD-10-CM

## 2019-07-30 PROCEDURE — 96375 TX/PRO/DX INJ NEW DRUG ADDON: CPT

## 2019-07-30 PROCEDURE — 96366 THER/PROPH/DIAG IV INF ADDON: CPT

## 2019-07-30 PROCEDURE — 96365 THER/PROPH/DIAG IV INF INIT: CPT

## 2019-07-30 PROCEDURE — 96367 TX/PROPH/DG ADDL SEQ IV INF: CPT

## 2019-07-30 RX ORDER — SODIUM CHLORIDE 9 MG/ML
20 INJECTION, SOLUTION INTRAVENOUS ONCE
Status: CANCELLED | OUTPATIENT
Start: 2019-08-27

## 2019-07-30 RX ORDER — ACETAMINOPHEN 325 MG/1
650 TABLET ORAL ONCE
Status: COMPLETED | OUTPATIENT
Start: 2019-07-30 | End: 2019-07-30

## 2019-07-30 RX ORDER — ACETAMINOPHEN 325 MG/1
650 TABLET ORAL ONCE
Status: CANCELLED | OUTPATIENT
Start: 2019-08-27

## 2019-07-30 RX ORDER — SODIUM CHLORIDE 9 MG/ML
20 INJECTION, SOLUTION INTRAVENOUS ONCE
Status: COMPLETED | OUTPATIENT
Start: 2019-07-30 | End: 2019-07-30

## 2019-07-30 RX ADMIN — ACETAMINOPHEN 650 MG: 325 TABLET, FILM COATED ORAL at 08:18

## 2019-07-30 RX ADMIN — SODIUM CHLORIDE 20 ML/HR: 0.9 INJECTION, SOLUTION INTRAVENOUS at 08:20

## 2019-07-30 RX ADMIN — DIPHENHYDRAMINE HYDROCHLORIDE 12.5 MG: 50 INJECTION, SOLUTION INTRAMUSCULAR; INTRAVENOUS at 08:20

## 2019-07-30 RX ADMIN — Medication 22.5 G: at 08:52

## 2019-07-30 RX ADMIN — HYDROCORTISONE SODIUM SUCCINATE 100 MG: 100 INJECTION, POWDER, FOR SOLUTION INTRAMUSCULAR; INTRAVENOUS at 08:48

## 2019-07-30 NOTE — PLAN OF CARE
Problem: Potential for Falls  Goal: Patient will remain free of falls  Description  INTERVENTIONS:  - Assess patient frequently for physical needs  -  Identify cognitive and physical deficits and behaviors that affect risk of falls    -  [place] fall precautions as indicated by assessment   - Educate patient/family on patient safety including physical limitations  - Instruct patient to call for assistance with activity based on assessment  - Modify environment to reduce risk of injury  - Consider OT/PT consult to assist with strengthening/mobility  Outcome: Progressing

## 2019-07-30 NOTE — PROGRESS NOTES
Patient tolerated IVIG infusion well  Offers no complaints  Pt is aware of all future appointments   Refused AVS

## 2019-08-08 DIAGNOSIS — C91.10 CLL (CHRONIC LYMPHOCYTIC LEUKEMIA) (HCC): ICD-10-CM

## 2019-08-08 RX ORDER — ACETAMINOPHEN 325 MG/1
650 TABLET ORAL ONCE
Status: CANCELLED | OUTPATIENT
Start: 2019-08-13

## 2019-08-08 RX ORDER — SODIUM CHLORIDE 9 MG/ML
20 INJECTION, SOLUTION INTRAVENOUS ONCE
Status: CANCELLED | OUTPATIENT
Start: 2019-08-13

## 2019-08-13 ENCOUNTER — HOSPITAL ENCOUNTER (OUTPATIENT)
Dept: INFUSION CENTER | Facility: CLINIC | Age: 64
Discharge: HOME | End: 2019-08-13
Payer: MEDICARE

## 2019-08-13 VITALS
DIASTOLIC BLOOD PRESSURE: 91 MMHG | BODY MASS INDEX: 30.98 KG/M2 | WEIGHT: 232 LBS | HEART RATE: 69 BPM | SYSTOLIC BLOOD PRESSURE: 150 MMHG | TEMPERATURE: 98 F | RESPIRATION RATE: 69 BRPM

## 2019-08-13 DIAGNOSIS — D80.1 HYPOGAMMAGLOBULINEMIA, ACQUIRED (HCC): ICD-10-CM

## 2019-08-13 DIAGNOSIS — C91.10 CLL (CHRONIC LYMPHOCYTIC LEUKEMIA) (HCC): Primary | ICD-10-CM

## 2019-08-13 LAB
ALBUMIN SERPL BCP-MCNC: 3.8 G/DL (ref 3.5–5.7)
ALP SERPL-CCNC: 58 U/L (ref 46–116)
ALT SERPL W P-5'-P-CCNC: 30 U/L (ref 12–78)
ANION GAP SERPL CALCULATED.3IONS-SCNC: 8 MMOL/L (ref 4–13)
AST SERPL W P-5'-P-CCNC: 33 U/L (ref 5–45)
BASOPHILS # BLD MANUAL: 0 THOUSAND/UL (ref 0–0.1)
BASOPHILS NFR MAR MANUAL: 0 % (ref 0–1)
BILIRUB SERPL-MCNC: 0.6 MG/DL (ref 0.2–1)
BUN SERPL-MCNC: 26 MG/DL (ref 5–25)
CALCIUM SERPL-MCNC: 9.5 MG/DL (ref 8.3–10.1)
CHLORIDE SERPL-SCNC: 110 MMOL/L (ref 98–108)
CO2 SERPL-SCNC: 26 MMOL/L (ref 21–32)
CREAT SERPL-MCNC: 1.2 MG/DL (ref 0.6–1.3)
EOSINOPHIL # BLD MANUAL: 0.24 THOUSAND/UL (ref 0–0.4)
EOSINOPHIL NFR BLD MANUAL: 5 % (ref 0–6)
ERYTHROCYTE [DISTWIDTH] IN BLOOD BY AUTOMATED COUNT: 13.8 % (ref 11.6–15.1)
GFR SERPL CREATININE-BSD FRML MDRD: 64 ML/MIN/1.73SQ M
GLUCOSE SERPL-MCNC: 145 MG/DL (ref 65–140)
HCT VFR BLD AUTO: 41.1 % (ref 36.5–49.3)
HGB BLD-MCNC: 14.4 G/DL (ref 12–17)
HGB RETIC QN AUTO: 34.2 PG (ref 30–38.3)
IGG SERPL-MCNC: 490 MG/DL (ref 700–1600)
IMM RETICS NFR: 11.9 % (ref 0–14)
LDH SERPL-CCNC: 221 U/L (ref 81–234)
LYMPHOCYTES # BLD AUTO: 0.71 THOUSAND/UL (ref 0.6–4.47)
LYMPHOCYTES # BLD AUTO: 15 % (ref 14–44)
MCH RBC QN AUTO: 31 PG (ref 26.8–34.3)
MCHC RBC AUTO-ENTMCNC: 35 G/DL (ref 31.4–37.4)
MCV RBC AUTO: 88 FL (ref 82–98)
MONOCYTES # BLD AUTO: 0.57 THOUSAND/UL (ref 0–1.22)
MONOCYTES NFR BLD: 12 % (ref 4–12)
NEUTROPHILS # BLD MANUAL: 3.22 THOUSAND/UL (ref 1.85–7.62)
NEUTS SEG NFR BLD AUTO: 68 % (ref 43–75)
PLATELET # BLD AUTO: 133 THOUSANDS/UL (ref 149–390)
PLATELET BLD QL SMEAR: ABNORMAL
PMV BLD AUTO: 11.4 FL (ref 8.9–12.7)
POTASSIUM SERPL-SCNC: 3.9 MMOL/L (ref 3.5–5.3)
PROT SERPL-MCNC: 6.3 G/DL (ref 6.4–8.2)
RBC # BLD AUTO: 4.65 MILLION/UL (ref 3.88–5.62)
RBC MORPH BLD: NORMAL
RETICS # AUTO: ABNORMAL 10*3/UL (ref 14356–105094)
RETICS # CALC: 1.93 % (ref 0.37–1.87)
SODIUM SERPL-SCNC: 144 MMOL/L (ref 136–145)
TOTAL CELLS COUNTED SPEC: 100
URATE SERPL-MCNC: 4.5 MG/DL (ref 4.2–8)
WBC # BLD AUTO: 4.74 THOUSAND/UL (ref 4.31–10.16)

## 2019-08-13 PROCEDURE — 96413 CHEMO IV INFUSION 1 HR: CPT

## 2019-08-13 PROCEDURE — 85046 RETICYTE/HGB CONCENTRATE: CPT | Performed by: INTERNAL MEDICINE

## 2019-08-13 PROCEDURE — 85007 BL SMEAR W/DIFF WBC COUNT: CPT | Performed by: INTERNAL MEDICINE

## 2019-08-13 PROCEDURE — 85027 COMPLETE CBC AUTOMATED: CPT | Performed by: INTERNAL MEDICINE

## 2019-08-13 PROCEDURE — 96415 CHEMO IV INFUSION ADDL HR: CPT

## 2019-08-13 PROCEDURE — 83615 LACTATE (LD) (LDH) ENZYME: CPT | Performed by: INTERNAL MEDICINE

## 2019-08-13 PROCEDURE — 82784 ASSAY IGA/IGD/IGG/IGM EACH: CPT | Performed by: INTERNAL MEDICINE

## 2019-08-13 PROCEDURE — 84550 ASSAY OF BLOOD/URIC ACID: CPT | Performed by: INTERNAL MEDICINE

## 2019-08-13 PROCEDURE — 82232 ASSAY OF BETA-2 PROTEIN: CPT | Performed by: INTERNAL MEDICINE

## 2019-08-13 PROCEDURE — 80053 COMPREHEN METABOLIC PANEL: CPT | Performed by: INTERNAL MEDICINE

## 2019-08-13 PROCEDURE — 96367 TX/PROPH/DG ADDL SEQ IV INF: CPT

## 2019-08-13 RX ORDER — ACETAMINOPHEN 325 MG/1
650 TABLET ORAL ONCE
Status: COMPLETED | OUTPATIENT
Start: 2019-08-13 | End: 2019-08-13

## 2019-08-13 RX ORDER — SODIUM CHLORIDE 9 MG/ML
20 INJECTION, SOLUTION INTRAVENOUS ONCE
Status: COMPLETED | OUTPATIENT
Start: 2019-08-13 | End: 2019-08-13

## 2019-08-13 RX ADMIN — DIPHENHYDRAMINE HYDROCHLORIDE 25 MG: 50 INJECTION, SOLUTION INTRAMUSCULAR; INTRAVENOUS at 08:45

## 2019-08-13 RX ADMIN — DEXAMETHASONE SODIUM PHOSPHATE 20 MG: 10 INJECTION, SOLUTION INTRAMUSCULAR; INTRAVENOUS at 08:29

## 2019-08-13 RX ADMIN — ACETAMINOPHEN 650 MG: 325 TABLET, FILM COATED ORAL at 08:26

## 2019-08-13 RX ADMIN — SODIUM CHLORIDE 20 ML/HR: 0.9 INJECTION, SOLUTION INTRAVENOUS at 08:29

## 2019-08-13 RX ADMIN — OBINUTUZUMAB 1000 MG: 1000 INJECTION, SOLUTION, CONCENTRATE INTRAVENOUS at 09:19

## 2019-08-13 NOTE — PLAN OF CARE
Problem: Potential for Falls  Goal: Patient will remain free of falls  Description  INTERVENTIONS:  - Assess patient frequently for physical needs  -  Identify cognitive and physical deficits and behaviors that affect risk of falls    -  [place] fall precautions as indicated by assessment   - Educate patient/family on patient safety including physical limitations  - Instruct patient to call for assistance with activity based on assessment  - Modify environment to reduce risk of injury  - Consider OT/PT consult to assist with strengthening/mobility  Outcome: Progressing

## 2019-08-14 LAB — B2 MICROGLOB SERPL-MCNC: 2.5 MG/L (ref 0.6–2.4)

## 2019-08-23 DIAGNOSIS — C91.10 CLL (CHRONIC LYMPHOCYTIC LEUKEMIA) (HCC): ICD-10-CM

## 2019-08-23 RX ORDER — PREDNISONE 1 MG/1
TABLET ORAL
Qty: 30 TABLET | Refills: 4 | Status: SHIPPED | OUTPATIENT
Start: 2019-08-23 | End: 2020-01-08 | Stop reason: SDUPTHER

## 2019-08-26 ENCOUNTER — TELEPHONE (OUTPATIENT)
Dept: GASTROENTEROLOGY | Facility: CLINIC | Age: 64
End: 2019-08-26

## 2019-08-26 NOTE — TELEPHONE ENCOUNTER
Dr Dixie De pt called stating he needed to sched his EGD  Pt states he has an EGD every couple of months   Pt can be reached at [phone]

## 2019-08-27 ENCOUNTER — HOSPITAL ENCOUNTER (OUTPATIENT)
Dept: INFUSION CENTER | Facility: CLINIC | Age: 64
Discharge: HOME | End: 2019-08-27
Payer: MEDICARE

## 2019-08-27 VITALS
DIASTOLIC BLOOD PRESSURE: 75 MMHG | BODY MASS INDEX: 31.34 KG/M2 | HEART RATE: 79 BPM | TEMPERATURE: 97.4 F | SYSTOLIC BLOOD PRESSURE: 150 MMHG | RESPIRATION RATE: 18 BRPM | WEIGHT: 234.68 LBS

## 2019-08-27 DIAGNOSIS — C91.10 CLL (CHRONIC LYMPHOCYTIC LEUKEMIA) (HCC): Primary | ICD-10-CM

## 2019-08-27 DIAGNOSIS — D80.1 HYPOGAMMAGLOBULINEMIA, ACQUIRED (HCC): ICD-10-CM

## 2019-08-27 PROCEDURE — 96367 TX/PROPH/DG ADDL SEQ IV INF: CPT

## 2019-08-27 PROCEDURE — 96375 TX/PRO/DX INJ NEW DRUG ADDON: CPT

## 2019-08-27 PROCEDURE — 96365 THER/PROPH/DIAG IV INF INIT: CPT

## 2019-08-27 PROCEDURE — 96366 THER/PROPH/DIAG IV INF ADDON: CPT

## 2019-08-27 RX ORDER — SODIUM CHLORIDE 9 MG/ML
20 INJECTION, SOLUTION INTRAVENOUS ONCE
Status: CANCELLED | OUTPATIENT
Start: 2019-09-24

## 2019-08-27 RX ORDER — ACETAMINOPHEN 325 MG/1
650 TABLET ORAL ONCE
Status: COMPLETED | OUTPATIENT
Start: 2019-08-27 | End: 2019-08-27

## 2019-08-27 RX ORDER — SODIUM CHLORIDE 9 MG/ML
20 INJECTION, SOLUTION INTRAVENOUS ONCE
Status: COMPLETED | OUTPATIENT
Start: 2019-08-27 | End: 2019-08-27

## 2019-08-27 RX ORDER — ACETAMINOPHEN 325 MG/1
650 TABLET ORAL ONCE
Status: CANCELLED | OUTPATIENT
Start: 2019-09-24

## 2019-08-27 RX ADMIN — ACETAMINOPHEN 650 MG: 325 TABLET, FILM COATED ORAL at 08:30

## 2019-08-27 RX ADMIN — DIPHENHYDRAMINE HYDROCHLORIDE 12.5 MG: 50 INJECTION, SOLUTION INTRAMUSCULAR; INTRAVENOUS at 08:34

## 2019-08-27 RX ADMIN — SODIUM CHLORIDE 20 ML/HR: 0.9 INJECTION, SOLUTION INTRAVENOUS at 08:31

## 2019-08-27 RX ADMIN — Medication 20 G: at 09:16

## 2019-08-27 RX ADMIN — HYDROCORTISONE SODIUM SUCCINATE 100 MG: 100 INJECTION, POWDER, FOR SOLUTION INTRAMUSCULAR; INTRAVENOUS at 08:31

## 2019-08-27 NOTE — PLAN OF CARE
Problem: Potential for Falls  Goal: Patient will remain free of falls  Description  INTERVENTIONS:  - Assess patient frequently for physical needs  -  Identify cognitive and physical deficits and behaviors that affect risk of falls    -  [place] fall precautions as indicated by assessment   - Educate patient/family on patient safety including physical limitations  - Instruct patient to call for assistance with activity based on assessment  - Modify environment to reduce risk of injury  - Consider OT/PT consult to assist with strengthening/mobility  Outcome: Progressing

## 2019-08-28 ENCOUNTER — OFFICE VISIT (OUTPATIENT)
Dept: HEMATOLOGY ONCOLOGY | Facility: CLINIC | Age: 64
End: 2019-08-28
Payer: MEDICARE

## 2019-08-28 VITALS
WEIGHT: 234 LBS | TEMPERATURE: 97.7 F | DIASTOLIC BLOOD PRESSURE: 82 MMHG | BODY MASS INDEX: 31.01 KG/M2 | OXYGEN SATURATION: 96 % | RESPIRATION RATE: 16 BRPM | SYSTOLIC BLOOD PRESSURE: 138 MMHG | HEART RATE: 76 BPM | HEIGHT: 73 IN

## 2019-08-28 DIAGNOSIS — C91.10 CLL (CHRONIC LYMPHOCYTIC LEUKEMIA) (HCC): ICD-10-CM

## 2019-08-28 DIAGNOSIS — D59.10 AUTOIMMUNE HEMOLYTIC ANEMIA (HCC): ICD-10-CM

## 2019-08-28 DIAGNOSIS — D69.6 THROMBOCYTOPENIA (HCC): ICD-10-CM

## 2019-08-28 DIAGNOSIS — C91.10 CHRONIC LYMPHOCYTIC LEUKEMIA (HCC): Primary | ICD-10-CM

## 2019-08-28 PROCEDURE — 1123F ACP DISCUSS/DSCN MKR DOCD: CPT | Performed by: PHYSICIAN ASSISTANT

## 2019-08-28 PROCEDURE — 99214 OFFICE O/P EST MOD 30 MIN: CPT | Performed by: PHYSICIAN ASSISTANT

## 2019-08-28 RX ORDER — ACETAMINOPHEN 325 MG/1
650 TABLET ORAL ONCE
Status: CANCELLED | OUTPATIENT
Start: 2019-09-24

## 2019-08-28 RX ORDER — ACETAMINOPHEN 325 MG/1
650 TABLET ORAL ONCE
Status: CANCELLED | OUTPATIENT
Start: 2019-09-10

## 2019-08-28 RX ORDER — SODIUM CHLORIDE 9 MG/ML
20 INJECTION, SOLUTION INTRAVENOUS ONCE
Status: CANCELLED | OUTPATIENT
Start: 2019-09-10

## 2019-08-28 RX ORDER — SODIUM CHLORIDE 9 MG/ML
20 INJECTION, SOLUTION INTRAVENOUS ONCE
Status: CANCELLED | OUTPATIENT
Start: 2019-09-24

## 2019-08-28 NOTE — PROGRESS NOTES
Hematology/Oncology Outpatient Follow-up  Anamaria Santos 71 y o  male 1955 [id number]    Date:  8/28/2019      Assessment and Plan:    1  Chronic lymphocytic leukemia ([address] ), 3  Thrombocytopenia [facility])  16-year-old male presents for follow-up regarding CLL  He is managed with ibrutinib 140 mg daily and Gazyva 1000 mg every 4 weeks  Labs remain acceptable  No change in treatment  He does continue to have arthralgias related to Imbrutinib  These are managed well with gabapentin 600 mg PO daily  He receives this from his PCP  He has history of ulcer 2/2 herpes virus  He has upcoming EGD  He also continues on acyclovir  Imaging in June 2019 also showed previous splenomegaly to now have resolved which shows response to therapy as well  Follow up in 3 months  Labs prior to each Gazyva infusion  2  Autoimmune hemolytic anemia (HCC)  He continues on prednisone 5 mg PO daily  He continues to monitor her sugar PRN due to this  Sugars are only increased when he is receiving decadron IV at the infusion center  - Infusion Calculated Appointment Request; Future  - CBC and differential; Future  - Comprehensive metabolic panel; Future    3  Hypogammaglobulinemia   IgG was noted to be low  He was initiated on IVIG  He is tolerating this well  IgG is coming up  He is not had any infections recently  HPI:  16-year-old male presents for follow-up regarding a longstanding history of CLL as well as hemolytic anemia related to his CLL  On 3/20/17 patient found to have hemoglobin of 6 2, ,000  He was admitted to [facility] for blood transfusion and plan to transfer to [address] [place] [address]  On 3/20/17 WBC count 195,000, hemoglobin 4 9, platelet count 65  Direct coomb's was positive with undetectable haptoglobin  He was given 2 units of PRBCs, Solu-Medrol 1 g ×3 days and IVIG 1 g/kg daily ×3 days  His hemoglobin continued to drop   Bone marrow biopsy on 3/21 showed marrow cellularity of greater than 95% almost replaced by small lymphocytes, lambda light chain restricted, CD20 positive, CD19 positive, CD23 positive partially expressing CD11c and CD25 and CD5 positive and negative for CD 79 and CD38  He was treated with single dose of chlorambucil to control his CLL   3/22 second dose of chlorambucil, also Rituxan 375 mg/M ² autoimmune hemolytic anemia  WBC continued to rise, 266,000  Patient initiated with Venetoclax 20 mg daily  Tumor lysis monitored every 8 hours; given aggressive hydration and Lasix and remained euvolemic  3/24-WBC dropped to 112,000  3/25- WBC 63,000  3/26-WBC 15,000, , platelet count 07,485  Patient became febrile, 101 3  The cefepime initiated Venetoclax held  3/28-patient fell from bed, CT head negative  ANC 1200, cefepime discontinued and Levaquin 75 mg daily started sliding 3/29 given second dose of rituximab 375 mg/m ²   3/30-WBC meg to 14 5 thousand, platelets 00,848, Venetoclax restarted 10 mg every other day  3/31 WBC 4 4, , platelet count 29,635  4/1-4/4: WBC maintained less than 10,000, ANC and platelet count meg  He was discharged on Venetoclax 10 mg every other day  He was instructed to discontinue simvastatin, Ranexa, aspirin, metoprolol 50 mg q  day, losartan 50 mg q  day, Plavix 75 mg q  day, folic acid 640 µg b i d , prednisone 60 mg, allopurinol  He was advised to continue Levaquin 750 mg daily for 10 days, Lexapro 10 mg daily, fenofibrate 50 mg daily, gabapentin 100 mg twice daily, Protonix 40 mg daily     Imaging studies performed at [facility]:  Patient had echocardiogram while inpatient at [facility] on 3/21  This study was unremarkable  CT chest abdomen pelvis without contrast on 3/24 showed stable pulmonary nodules, patchy groundglass densities of lower lobes previously identified and which may represent volume loss or early infiltrate   Persistent diffuse bronchial wall thickening suggesting acute/chronic bronchitis changes  No bronchiectasis  No masses  Stable lymphadenopathy of mediastinum  Stable axillary lymphadenopathy  Splenomegaly 23 9 cm  Extensive lymphadenopathy throughout the entire retroperitoneal, small bowel mesentery, and pelvis  Largest lymph node in right lower quadrant measured 4 6 x 4 8  Stable enlarged left para-aortic lymph node measuring 6 2 x 6 8   4/12/17: Patient received one dose of Rituxan on 4/7/17  Venetoclax 10 mg every other day 4/5/17 - 4/9/17  Venetoclax 10 mg every day beginning 4/10/17  Monitoring CBC 3 times per week  4/28/17: patient had follow-up appointment at [facility] with Dr David Costa  She recommended to slowly increase dose of veneteclax  Also, she recommended as he has had numerous treatments with Rituxan, if antibody directed therapy becomes indicated in the future recommendation was with [facility]  She will be seeing her on a p r n  Basis      July 2017- Hemolysis  Disease not under control with veneteclax  Started prednisone 60 mg daily, folic acid, IBRUTINIB 983 mg daily and Gazyva       Sept 2017- 9/18-9/20 patient was admitted due to uncontrolled hyperglycemia with new onset DM type II due to chronic prednisone use for CLL/hemolytic anemia; also found to have profound neutropenia  Ibrutinib dose was reduced to 280 mg daily      October 2017- 10/7/17 - 10/14/17 patient was admitted due to cellulitis on his scalp     Dec 2017- Leeta Fray decreased to 140 mg PO daily due to thrombocytopenia      4/23 - 4/27 patient was admitted due to listeria bacteremia  He was discharged on IV ampicillin  Echo negative for vegetations  [facility] and [facility] was on hold at that time  Repeat CBC on 5/14/18 showed normal ANC, and hemoglobin  Platelets adequate at 83K      10/18/18 the patient EGD   This showed distal esophageal stricture   This was dilated to 18 mm using a balloon dilator   He could not dilate any further due to bleeding secondary to thrombocytopenia      Interval history: 'food sticking' again   Taking 5 mg prednisone   Taking 600 mg gabapentin for arthralgias   Sugars are good, takes sugar PRN; only needs insulin days he is at infusion and receives Decadron     ROS: Review of Systems   Constitutional: Negative for appetite change, chills, fatigue, fever and unexpected weight change  Respiratory: Negative for cough and shortness of breath  Cardiovascular: Negative for chest pain, palpitations and leg swelling  Gastrointestinal: Negative for abdominal pain, blood in stool, constipation, diarrhea, nausea and vomiting  Genitourinary: Negative for difficulty urinating and dysuria  Musculoskeletal: Positive for arthralgias  Skin: Negative  Neurological: Negative for dizziness, light-headedness, numbness and headaches  Hematological: Negative  Psychiatric/Behavioral: Negative          Past Medical History:   Diagnosis Date    Anemia     Arthritis     CAD (coronary artery disease) 2004    Cellulitis of scalp     CKD (chronic kidney disease) stage 3, GFR 30-59 ml/min ([facility]     CLL [facility]chronic lymphocytic leukemia) ([facility]     COPD [facility] obstructive [facility] disease) [facility]     Diabetes mellitus ([facility] )     Dyslipidemia     Edema extremities     Esophageal ulcer     H/O blood clots     Hemolytic anemia ([facility]     Hiatal hernia     History of TIA (transient ischemic attack)     Hyperlipidemia     Hypertension     Listeria infection 2018    Multiple gastric ulcers     Myocardial infarction ([facility] ) 2004    acute    Neutropenia ([facility])     Obese     Recurrent sinusitis     Sleep apnea     Thrombocytopenia ([facility]     Vertigo        Past Surgical History:   Procedure Laterality Date    ARTHROSCOPIC REPAIR ACL      lt knee    CARDIAC SURGERY      cardiac cath with stent    FOOT SURGERY      HAND SURGERY      IR PORTACATHETERGRAM  5/17/2019    KNEE ARTHROSCOPY      OTHER SURGICAL HISTORY      cardiac cath lesion 1, 1st adjunct treat device: stent    PORTACATH PLACEMENT      OR ESOPHAGOGASTRODUODENOSCOPY TRANSORAL DIAGNOSTIC N/A 10/5/2017    Procedure: ESOPHAGOGASTRODUODENOSCOPY (EGD) with bx;  Surgeon: Clay Underwood DO;  Location: [facility]; Service: Gastroenterology    OR ESOPHAGOGASTRODUODENOSCOPY TRANSORAL DIAGNOSTIC N/A 3/8/2018    Procedure: ESOPHAGOGASTRODUODENOSCOPY (EGD) with biopsy;  Surgeon: Clay Underwood DO;  [facility]; Service: Gastroenterology    OR ESOPHAGOGASTRODUODENOSCOPY TRANSORAL DIAGNOSTIC N/A 2018    Procedure: ESOPHAGOGASTRODUODENOSCOPY (EGD) with Dilation;  Surgeon: Clay Underwood DO;  [facility]; Service: Gastroenterology    OR ESOPHAGOGASTRODUODENOSCOPY TRANSORAL DIAGNOSTIC N/A 10/18/2018    Procedure: ESOPHAGOGASTRODUODENOSCOPY (EGD) with dilation;  Surgeon: Sriram Rodrigues MD;  Location: [facility]; Service: Gastroenterology    OR ESOPHAGOSCOPY FLEXIBLE TRANSORAL WITH BIOPSY N/A 2016    Procedure: ESOPHAGOGASTRODUODENOSCOPY (EGD); ESOPHAGEAL DILATION; ESOPHAGEAL BIOPSY;  Surgeon: Sadie Amezquita MD;  [facility];  Service: Thoracic    TONSILLECTOMY      UPPER GASTROINTESTINAL ENDOSCOPY      x 6       Social History     Socioeconomic History    Marital status: /Civil Union     Spouse name: None    Number of children: None    Years of education: None    Highest education level: None   Occupational History    None   Social Needs    Financial resource strain: None    Food insecurity:     Worry: None     Inability: None    Transportation needs:     Medical: None     Non-medical: None   Tobacco Use    Smoking status: Former Smoker     Packs/day: 1 00     Years: 10 00     Pack years: 10 00     Types: Cigarettes     Last attempt to quit: 2015     Years since quittin 4    Smokeless tobacco: Never Used   Substance and Sexual Activity    Alcohol use:  Yes     Alcohol/week: 2 0 standard drinks     Types: 2 Cans of beer per week     Comment: social use as per [employer]    Drug use: No    Sexual activity: None   Lifestyle    Physical activity:     Days per week: None     Minutes per session: None    Stress: None   Relationships    Social connections:     Talks on phone: None     Gets together: None     Attends Shinto service: None     Active member of club or organization: None     Attends meetings of clubs or organizations: None     Relationship status: None    Intimate partner violence:     Fear of current or ex partner: None     Emotionally abused: None     Physically abused: None     Forced sexual activity: None   Other Topics Concern    None   Social History Narrative    None       Family History   Problem Relation Age of Onset    Leukemia Mother         chronic    Colon polyps Mother         sidmoid    Parkinsonism Father        Allergies   Allergen Reactions    Heparin Other (See Comments)     Other reaction(s): Blood Disorders  clot    Macrolides And Ketolides Other (See Comments)     Interacts with other meds he is taking         Current Outpatient Medications:     acyclovir (ZOVIRAX) 400 MG tablet, Take 1 tablet (400 mg total) by mouth 2 (two) times a day for 180 days, Disp: 60 tablet, Rfl: 5    aspirin 81 MG tablet, Take 81 mg by mouth every other day Every other day , Disp: , Rfl:     citalopram (CeleXA) 40 mg tablet, Take 1 tablet (40 mg total) by mouth daily, Disp: 90 tablet, Rfl: 3    fenofibrate (TRICOR) 145 mg tablet, Take 1 tablet (145 mg total) by mouth daily, Disp: 90 tablet, Rfl: 3    folic acid (FOLVITE) 1 mg tablet, Take 1 mg by mouth daily, Disp: , Rfl:     furosemide (LASIX) 40 mg tablet, furosemide 40 mg tablet, Disp: , Rfl:     gabapentin (NEURONTIN) 600 MG tablet, TAKE 1 TABLET (600 MG TOTAL) BY MOUTH DAILY, Disp: 30 tablet, Rfl: 2    glucose monitoring kit (FREESTYLE) monitoring kit, 1 each by Does not apply route as needed ( ) E 11 9, Disp: 1 each, Rfl: 0    Ibrutinib 140 MG TABS, Take 140 mg by mouth daily for 148 days, Disp: 30 tablet, Rfl: 5    insulin lispro (HUMALOG KWIKPEN) 100 units/mL injection pen, Use 5-10 units before meals as needed for steroid induced hyperglycemia, Disp: 5 pen, Rfl: 1    Insulin Syringe-Needle U-100 30G X 1/2" 0 3 ML MISC, by Does not apply route 2 (two) times a day, Disp: 100 each, Rfl: 6    Lancets (FREESTYLE) lancets, Use as instructed--test 2 times a day  E 11 9, Disp: 100 each, Rfl: 0    losartan (COZAAR) 50 mg tablet, Take 1 tablet (50 mg total) by mouth daily for 360 days, Disp: 90 tablet, Rfl: 3    multivitamin (THERAGRAN) TABS, Take 1 tablet by mouth daily, Disp: , Rfl:     obinutuzumab (GAZYVA) 1000 mg/40 mL SOLN, Infuse into a venous catheter, Disp: , Rfl:     oxyCODONE (ROXICODONE) 10 MG TABS, Take 1 tablet (10 mg total) by mouth every 6 (six) hours as needed for moderate pain For ongoing pain control Max Daily Amount: 40 mg, Disp: 90 tablet, Rfl: 0    pantoprazole (PROTONIX) 40 mg tablet, Take 1 tablet (40 mg total) by mouth 2 (two) times a day for 30 days, Disp: 60 tablet, Rfl: 6    Potassium (POTASSIMIN PO), Take 20 mEq by mouth daily, Disp: , Rfl:     predniSONE 5 mg tablet, TAKE 1 TO 2 TABLETS EVERY DAY AS DIRECTED WITH FOOD, Disp: 30 tablet, Rfl: 4    sodium chloride, PF, 0 9 %, 10 mL by Intracatheter route see administration instructions 10mL into port before and after ampicillin doses, Disp: , Rfl: 0    VENTOLIN  (90 Base) MCG/ACT inhaler, Inhale 2 puffs 4 (four) times a day as needed for wheezing, Disp: 2 Inhaler, Rfl: 3  No current facility-administered medications for this visit  Physical Exam:  /82 (BP Location: Right arm, Patient Position: Sitting, Cuff Size: Adult)   Pulse 76   Temp 97 7 °F (36 5 °C)   Resp 16   Ht 6' 1" (1 854 m)   Wt 106 kg (234 lb)   SpO2 96%   BMI 30 87 kg/m²     Physical Exam   Constitutional: He is oriented to person, place, and time  He appears well-developed and well-nourished  No distress     HENT:   Head: Normocephalic and atraumatic  Eyes: Conjunctivae are normal  No scleral icterus  Neck: Normal range of motion  Neck supple  Cardiovascular: Normal rate, regular rhythm and normal heart sounds  No murmur heard  Pulmonary/Chest: Effort normal and breath sounds normal  No respiratory distress  Abdominal: Soft  There is no tenderness  Musculoskeletal: Normal range of motion  He exhibits no edema or tenderness  Lymphadenopathy:     He has no cervical adenopathy  Neurological: He is alert and oriented to person, place, and time  No cranial nerve deficit  Skin: Skin is warm and dry  Psychiatric: He has a normal mood and affect  Labs:  Lab Results   Component Value Date    WBC 4 74 08/13/2019    HGB 14 4 08/13/2019    HCT 41 1 08/13/2019    MCV 88 08/13/2019     (L) 08/13/2019     Lab Results   Component Value Date     12/29/2015    K 3 9 08/13/2019     (H) 08/13/2019    CO2 26 08/13/2019    ANIONGAP 8 12/29/2015    BUN 26 (H) 08/13/2019    CREATININE 1 20 08/13/2019    GLUCOSE 109 12/29/2015    GLUF 113 (H) 06/18/2019    CALCIUM 9 5 08/13/2019    AST 33 08/13/2019    ALT 30 08/13/2019    ALKPHOS 58 08/13/2019    PROT 5 9 (L) 12/29/2015    BILITOT 0 7 12/29/2015    EGFR 64 08/13/2019       Patient voiced understanding and agreement in the above discussion  Aware to contact our office with questions/symptoms in the interim

## 2019-08-29 ENCOUNTER — TELEPHONE (OUTPATIENT)
Dept: HEMATOLOGY ONCOLOGY | Facility: CLINIC | Age: 64
End: 2019-08-29

## 2019-08-29 DIAGNOSIS — C91.10 CLL (CHRONIC LYMPHOCYTIC LEUKEMIA) (HCC): ICD-10-CM

## 2019-08-29 NOTE — TELEPHONE ENCOUNTER
EGD scheduled with Dr Hansen at [facility] on 9/5/2019  I gave pt verbal instructions/emailed to [email or web address]

## 2019-08-29 NOTE — TELEPHONE ENCOUNTER
[facility] IS UNABLE TO SCHED ANY INFUSION APTS BECAUSE THERE ARE NO ORDERS   CAN YOU PLEASE REDO AND SIGN SO THEY CAN DO THE [address] [place] [address]

## 2019-09-03 ENCOUNTER — TELEPHONE (OUTPATIENT)
Dept: HEMATOLOGY ONCOLOGY | Facility: CLINIC | Age: 64
End: 2019-09-03

## 2019-09-03 DIAGNOSIS — G89.3 CANCER RELATED PAIN: ICD-10-CM

## 2019-09-03 RX ORDER — OXYCODONE HYDROCHLORIDE 10 MG/1
10 TABLET ORAL EVERY 6 HOURS PRN
Qty: 90 TABLET | Refills: 0 | Status: SHIPPED | OUTPATIENT
Start: 2019-09-03 | End: 2020-01-21 | Stop reason: SDUPTHER

## 2019-09-04 ENCOUNTER — ANESTHESIA EVENT (OUTPATIENT)
Dept: GASTROENTEROLOGY | Facility: HOSPITAL | Age: 64
End: 2019-09-04

## 2019-09-04 NOTE — ANESTHESIA PREPROCEDURE EVALUATION
Review of Systems/Medical History  Patient summary reviewed  Chart reviewed  No history of anesthetic complications     Cardiovascular  Exercise tolerance (METS): >4,  Hyperlipidemia, Hypertension , Past MI (2004) > 6 months, CAD , DVT   Pulmonary  Smoker ex-smoker  , COPD , Sleep apnea ,        GI/Hepatic  Dysphagia,   GERD , Esophageal disease benign esophageal stricture,  Hiatal hernia,        Chronic kidney disease stage 3,        Endo/Other  Diabetes (associated with chronic steroids) ,   Comment: Chronic prednisone 5mg daily Obesity    GYN       Hematology    Immunocompromised state leukemia, Lymphoma: CLL  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 Musculoskeletal    Arthritis     Neurology    TIA,   Comment: Chronic opioids, gabapentin Psychology   Negative psychology ROS          EKG 4/23/18: NSR, LAD, poor r-wave progression    TTE 4/27/18: LVEF 60%, no rWMA, g1 DD, trace MR, trace TR    CT c/a/p 6/27/19:   - No evidence of metastatic disease within the chest abdomen and pelvis  Interval resolution of previously noted splenomegaly   - Hepatic steatosis  - Lungs are clear  There is no tracheal or endobronchial lesion      Recent Results (from the past 672 hour(s))   Beta 2 microglobulin, serum    Collection Time: 08/13/19  8:13 AM   Result Value Ref Range    Beta-2 Microglobulin 2 5 (H) 0 6 - 2 4 mg/L   CBC and differential    Collection Time: 08/13/19  8:13 AM   Result Value Ref Range    WBC 4 74 4 31 - 10 16 Thousand/uL    RBC 4 65 3 88 - 5 62 Million/uL    Hemoglobin 14 4 12 0 - 17 0 g/dL    Hematocrit 41 1 36 5 - 49 3 %    MCV 88 82 - 98 fL    MCH 31 0 26 8 - 34 3 pg    MCHC 35 0 31 4 - 37 4 g/dL    RDW 13 8 11 6 - 15 1 %    MPV 11 4 8 9 - 12 7 fL    Platelets 693 (L) 960 - 390 Thousands/uL   Comprehensive metabolic panel    Collection Time: 08/13/19  8:13 AM   Result Value Ref Range    Sodium 144 136 - 145 mmol/L    Potassium 3 9 3 5 - 5 3 mmol/L    Chloride 110 (H) 98 - 108 mmol/L    CO2 26 21 - 32 mmol/L    ANION GAP 8 4 - 13 mmol/L    BUN 26 (H) 5 - 25 mg/dL    Creatinine 1 20 0 60 - 1 30 mg/dL    Glucose 145 (H) 65 - 140 mg/dL    Calcium 9 5 8 3 - 10 1 mg/dL    AST 33 5 - 45 U/L    ALT 30 12 - 78 U/L    Alkaline Phosphatase 58 46 - 116 U/L    Total Protein 6 3 (L) 6 4 - 8 2 g/dL    Albumin 3 8 3 5 - 5 7 g/dL    Total Bilirubin 0 60 0 20 - 1 00 mg/dL    eGFR 64 ml/min/1 73sq m   IgG    Collection Time: 08/13/19  8:13 AM   Result Value Ref Range     0 (L) 700 0-1,600 0 mg/dL   LD,Blood    Collection Time: 08/13/19  8:13 AM   Result Value Ref Range     81 - 234 U/L   Reticulocyte Count with Retic HGB    Collection Time: 08/13/19  8:13 AM   Result Value Ref Range    Immature Retic Fract 11 9 0 0 - 14 0 %    Retic Ct Pct 1 93 (H) 0 37 - 1 87 %    Retic Ct Abs 89,900 14,356-105,094    RETIC HGB 34 2 30 0 - 38 3 pg   Uric acid    Collection Time: 08/13/19  8:13 AM   Result Value Ref Range    Uric Acid 4 5 4 2 - 8 0 mg/dL   Manual Differential(PHLEBS Do Not Order)    Collection Time: 08/13/19  8:13 AM   Result Value Ref Range    Segmented % 68 43 - 75 %    Lymphocytes % 15 14 - 44 %    Monocytes % 12 4 - 12 %    Eosinophils, % 5 0 - 6 %    Basophils % 0 0 - 1 %    Absolute Neutrophils 3 22 1 85 - 7 62 Thousand/uL    Lymphocytes Absolute 0 71 0 60 - 4 47 Thousand/uL    Monocytes Absolute 0 57 0 00 - 1 22 Thousand/uL    Eosinophils Absolute 0 24 0 00 - 0 40 Thousand/uL    Basophils Absolute 0 00 0 00 - 0 10 Thousand/uL    Total Counted 100     RBC Morphology Normal     Platelet Estimate Borderline (A) Adequate       Physical Exam    Airway    Mallampati score: II  TM Distance: >3 FB  Neck ROM: full     Dental   upper dentures and lower dentures,     Cardiovascular  Rhythm: regular, Rate: normal, No murmur,     Pulmonary  Breath sounds clear to auscultation,     Other Findings        Anesthesia Plan  ASA Score- 3     Anesthesia Type- IV sedation with anesthesia with ASA Monitors     Additional Monitors:   Airway Plan: Plan Factors-    Induction-     Postoperative Plan-     Informed Consent- Anesthetic plan and risks discussed with patient  I personally reviewed this patient with the CRNA  Discussed and agreed on the Anesthesia Plan with the CRNA  Staci Scott

## 2019-09-05 ENCOUNTER — HOSPITAL ENCOUNTER (OUTPATIENT)
Dept: GASTROENTEROLOGY | Facility: HOSPITAL | Age: 64
Setting detail: OUTPATIENT SURGERY
Discharge: HOME | End: 2019-09-05
Attending: INTERNAL MEDICINE | Admitting: INTERNAL MEDICINE
Payer: MEDICARE

## 2019-09-05 ENCOUNTER — ANESTHESIA (OUTPATIENT)
Dept: GASTROENTEROLOGY | Facility: HOSPITAL | Age: 64
End: 2019-09-05

## 2019-09-05 VITALS
DIASTOLIC BLOOD PRESSURE: 65 MMHG | TEMPERATURE: 98.5 F | HEART RATE: 84 BPM | RESPIRATION RATE: 18 BRPM | OXYGEN SATURATION: 95 % | WEIGHT: 234 LBS | BODY MASS INDEX: 31.01 KG/M2 | SYSTOLIC BLOOD PRESSURE: 113 MMHG | HEIGHT: 73 IN

## 2019-09-05 DIAGNOSIS — R13.19 ESOPHAGEAL DYSPHAGIA: ICD-10-CM

## 2019-09-05 PROCEDURE — C1726 CATH, BAL DIL, NON-VASCULAR: HCPCS

## 2019-09-05 PROCEDURE — 43249 ESOPH EGD DILATION <30 MM: CPT | Performed by: INTERNAL MEDICINE

## 2019-09-05 RX ORDER — FENTANYL CITRATE/PF 50 MCG/ML
25 SYRINGE (ML) INJECTION
Status: CANCELLED | OUTPATIENT
Start: 2019-09-05

## 2019-09-05 RX ORDER — ONDANSETRON 2 MG/ML
4 INJECTION INTRAMUSCULAR; INTRAVENOUS ONCE AS NEEDED
Status: CANCELLED | OUTPATIENT
Start: 2019-09-05

## 2019-09-05 RX ORDER — LIDOCAINE HYDROCHLORIDE 10 MG/ML
INJECTION, SOLUTION INFILTRATION; PERINEURAL AS NEEDED
Status: DISCONTINUED | OUTPATIENT
Start: 2019-09-05 | End: 2019-09-05 | Stop reason: SURG

## 2019-09-05 RX ORDER — PROPOFOL 10 MG/ML
INJECTION, EMULSION INTRAVENOUS AS NEEDED
Status: DISCONTINUED | OUTPATIENT
Start: 2019-09-05 | End: 2019-09-05 | Stop reason: SURG

## 2019-09-05 RX ORDER — SODIUM CHLORIDE 9 MG/ML
125 INJECTION, SOLUTION INTRAVENOUS CONTINUOUS
Status: DISCONTINUED | OUTPATIENT
Start: 2019-09-05 | End: 2019-09-09 | Stop reason: HOSPADM

## 2019-09-05 RX ORDER — LIDOCAINE HYDROCHLORIDE 10 MG/ML
0.5 INJECTION, SOLUTION EPIDURAL; INFILTRATION; INTRACAUDAL; PERINEURAL ONCE AS NEEDED
Status: DISCONTINUED | OUTPATIENT
Start: 2019-09-05 | End: 2019-09-09 | Stop reason: HOSPADM

## 2019-09-05 RX ORDER — PROPOFOL 10 MG/ML
INJECTION, EMULSION INTRAVENOUS CONTINUOUS PRN
Status: DISCONTINUED | OUTPATIENT
Start: 2019-09-05 | End: 2019-09-05 | Stop reason: SURG

## 2019-09-05 RX ORDER — SODIUM CHLORIDE 9 MG/ML
INJECTION, SOLUTION INTRAVENOUS CONTINUOUS PRN
Status: DISCONTINUED | OUTPATIENT
Start: 2019-09-05 | End: 2019-09-05 | Stop reason: SURG

## 2019-09-05 RX ADMIN — PROPOFOL 30 MG: 10 INJECTION, EMULSION INTRAVENOUS at 08:05

## 2019-09-05 RX ADMIN — PROPOFOL 120 MCG/KG/MIN: 10 INJECTION, EMULSION INTRAVENOUS at 08:04

## 2019-09-05 RX ADMIN — SODIUM CHLORIDE: 0.9 INJECTION, SOLUTION INTRAVENOUS at 07:55

## 2019-09-05 RX ADMIN — SODIUM CHLORIDE 125 ML/HR: 0.9 INJECTION, SOLUTION INTRAVENOUS at 07:38

## 2019-09-05 RX ADMIN — PROPOFOL 50 MG: 10 INJECTION, EMULSION INTRAVENOUS at 08:04

## 2019-09-05 RX ADMIN — PROPOFOL 120 MG: 10 INJECTION, EMULSION INTRAVENOUS at 08:03

## 2019-09-05 RX ADMIN — LIDOCAINE HYDROCHLORIDE 100 MG: 10 INJECTION, SOLUTION INFILTRATION; PERINEURAL at 08:03

## 2019-09-05 NOTE — ANESTHESIA POSTPROCEDURE EVALUATION
Post-Op Assessment Note    CV Status:  Stable  Pain Score: 0    Pain management: adequate     Mental Status:  Sleepy   Hydration Status:  Euvolemic   PONV Controlled:  Controlled   Airway Patency:  Patent   Post Op Vitals Reviewed: Yes      Staff: AnesthesiologistLISA           /68 (09/05/19 0817)    Temp      Pulse 88 (09/05/19 0817)   Resp 16 (09/05/19 0817)    SpO2 97 % (09/05/19 0817)

## 2019-09-10 ENCOUNTER — HOSPITAL ENCOUNTER (OUTPATIENT)
Dept: INFUSION CENTER | Facility: CLINIC | Age: 64
Discharge: HOME | End: 2019-09-10
Payer: MEDICARE

## 2019-09-10 VITALS
WEIGHT: 233.6 LBS | HEIGHT: 73 IN | DIASTOLIC BLOOD PRESSURE: 87 MMHG | HEART RATE: 81 BPM | BODY MASS INDEX: 30.96 KG/M2 | TEMPERATURE: 97.9 F | RESPIRATION RATE: 18 BRPM | SYSTOLIC BLOOD PRESSURE: 148 MMHG

## 2019-09-10 DIAGNOSIS — C91.10 CLL (CHRONIC LYMPHOCYTIC LEUKEMIA) (HCC): Primary | ICD-10-CM

## 2019-09-10 DIAGNOSIS — D80.1 HYPOGAMMAGLOBULINEMIA, ACQUIRED (HCC): ICD-10-CM

## 2019-09-10 LAB
ALBUMIN SERPL BCP-MCNC: 3.8 G/DL (ref 3.5–5.7)
ALP SERPL-CCNC: 52 U/L (ref 46–116)
ALT SERPL W P-5'-P-CCNC: 46 U/L (ref 12–78)
ANION GAP SERPL CALCULATED.3IONS-SCNC: 5 MMOL/L (ref 4–13)
AST SERPL W P-5'-P-CCNC: 38 U/L (ref 5–45)
BASOPHILS # BLD MANUAL: 0 THOUSAND/UL (ref 0–0.1)
BASOPHILS NFR MAR MANUAL: 0 % (ref 0–1)
BILIRUB SERPL-MCNC: 0.7 MG/DL (ref 0.2–1)
BUN SERPL-MCNC: 19 MG/DL (ref 5–25)
CALCIUM SERPL-MCNC: 9.3 MG/DL (ref 8.3–10.1)
CHLORIDE SERPL-SCNC: 108 MMOL/L (ref 98–108)
CO2 SERPL-SCNC: 28 MMOL/L (ref 21–32)
CREAT SERPL-MCNC: 0.9 MG/DL (ref 0.6–1.3)
EOSINOPHIL # BLD MANUAL: 0.09 THOUSAND/UL (ref 0–0.4)
EOSINOPHIL NFR BLD MANUAL: 2 % (ref 0–6)
ERYTHROCYTE [DISTWIDTH] IN BLOOD BY AUTOMATED COUNT: 14.1 % (ref 11.6–15.1)
GFR SERPL CREATININE-BSD FRML MDRD: 89 ML/MIN/1.73SQ M
GLUCOSE SERPL-MCNC: 149 MG/DL (ref 65–140)
HCT VFR BLD AUTO: 40.8 % (ref 36.5–49.3)
HGB BLD-MCNC: 14.2 G/DL (ref 12–17)
HGB RETIC QN AUTO: 34.4 PG (ref 30–38.3)
IGG SERPL-MCNC: 486 MG/DL (ref 700–1600)
IMM RETICS NFR: 12.4 % (ref 0–14)
LDH SERPL-CCNC: 204 U/L (ref 81–234)
LG PLATELETS BLD QL SMEAR: PRESENT
LYMPHOCYTES # BLD AUTO: 0.78 THOUSAND/UL (ref 0.6–4.47)
LYMPHOCYTES # BLD AUTO: 18 % (ref 14–44)
MCH RBC QN AUTO: 30.5 PG (ref 26.8–34.3)
MCHC RBC AUTO-ENTMCNC: 34.8 G/DL (ref 31.4–37.4)
MCV RBC AUTO: 88 FL (ref 82–98)
MONOCYTES # BLD AUTO: 0.52 THOUSAND/UL (ref 0–1.22)
MONOCYTES NFR BLD: 12 % (ref 4–12)
NEUTROPHILS # BLD MANUAL: 2.93 THOUSAND/UL (ref 1.85–7.62)
NEUTS BAND NFR BLD MANUAL: 2 % (ref 0–8)
NEUTS SEG NFR BLD AUTO: 66 % (ref 43–75)
OVALOCYTES BLD QL SMEAR: PRESENT
PLATELET # BLD AUTO: 133 THOUSANDS/UL (ref 149–390)
PLATELET BLD QL SMEAR: ABNORMAL
PMV BLD AUTO: 11.2 FL (ref 8.9–12.7)
POTASSIUM SERPL-SCNC: 3.8 MMOL/L (ref 3.5–5.3)
PROT SERPL-MCNC: 6.4 G/DL (ref 6.4–8.2)
RBC # BLD AUTO: 4.65 MILLION/UL (ref 3.88–5.62)
RETICS # AUTO: ABNORMAL 10*3/UL (ref 14356–105094)
RETICS # CALC: 2.54 % (ref 0.37–1.87)
SODIUM SERPL-SCNC: 141 MMOL/L (ref 136–145)
TOTAL CELLS COUNTED SPEC: 100
URATE SERPL-MCNC: 4.4 MG/DL (ref 4.2–8)
WBC # BLD AUTO: 4.31 THOUSAND/UL (ref 4.31–10.16)

## 2019-09-10 PROCEDURE — 80053 COMPREHEN METABOLIC PANEL: CPT | Performed by: INTERNAL MEDICINE

## 2019-09-10 PROCEDURE — 84550 ASSAY OF BLOOD/URIC ACID: CPT | Performed by: INTERNAL MEDICINE

## 2019-09-10 PROCEDURE — 96413 CHEMO IV INFUSION 1 HR: CPT

## 2019-09-10 PROCEDURE — 85027 COMPLETE CBC AUTOMATED: CPT | Performed by: INTERNAL MEDICINE

## 2019-09-10 PROCEDURE — 85046 RETICYTE/HGB CONCENTRATE: CPT | Performed by: INTERNAL MEDICINE

## 2019-09-10 PROCEDURE — 96367 TX/PROPH/DG ADDL SEQ IV INF: CPT

## 2019-09-10 PROCEDURE — 96415 CHEMO IV INFUSION ADDL HR: CPT

## 2019-09-10 PROCEDURE — 83615 LACTATE (LD) (LDH) ENZYME: CPT | Performed by: INTERNAL MEDICINE

## 2019-09-10 PROCEDURE — 85007 BL SMEAR W/DIFF WBC COUNT: CPT | Performed by: INTERNAL MEDICINE

## 2019-09-10 PROCEDURE — 82784 ASSAY IGA/IGD/IGG/IGM EACH: CPT | Performed by: INTERNAL MEDICINE

## 2019-09-10 PROCEDURE — 82232 ASSAY OF BETA-2 PROTEIN: CPT | Performed by: INTERNAL MEDICINE

## 2019-09-10 RX ORDER — ACETAMINOPHEN 325 MG/1
650 TABLET ORAL ONCE
Status: COMPLETED | OUTPATIENT
Start: 2019-09-10 | End: 2019-09-10

## 2019-09-10 RX ORDER — SODIUM CHLORIDE 9 MG/ML
20 INJECTION, SOLUTION INTRAVENOUS ONCE
Status: COMPLETED | OUTPATIENT
Start: 2019-09-10 | End: 2019-09-10

## 2019-09-10 RX ADMIN — DEXAMETHASONE SODIUM PHOSPHATE 20 MG: 10 INJECTION, SOLUTION INTRAMUSCULAR; INTRAVENOUS at 09:48

## 2019-09-10 RX ADMIN — SODIUM CHLORIDE 20 ML/HR: 0.9 INJECTION, SOLUTION INTRAVENOUS at 09:41

## 2019-09-10 RX ADMIN — ACETAMINOPHEN 650 MG: 325 TABLET, FILM COATED ORAL at 09:38

## 2019-09-10 RX ADMIN — DIPHENHYDRAMINE HYDROCHLORIDE 25 MG: 50 INJECTION, SOLUTION INTRAMUSCULAR; INTRAVENOUS at 10:15

## 2019-09-10 RX ADMIN — OBINUTUZUMAB 1000 MG: 1000 INJECTION, SOLUTION, CONCENTRATE INTRAVENOUS at 10:41

## 2019-09-10 NOTE — PROGRESS NOTES
Patient to the unit for Gazyva infusion  Denies signs or symptoms or infectionTolerated infusion without adverse reaction  AVS declined   Voices no questions or concerns

## 2019-09-10 NOTE — PLAN OF CARE
Problem: Potential for Falls  Goal: Patient will remain free of falls  Description  INTERVENTIONS:  - Assess patient frequently for physical needs  -  Identify cognitive and physical deficits and behaviors that affect risk of falls    -  [place] fall precautions as indicated by assessment   - Educate patient/family on patient safety including physical limitations  - Instruct patient to call for assistance with activity based on assessment  - Modify environment to reduce risk of injury  - Consider OT/PT consult to assist with strengthening/mobility  Outcome: Progressing

## 2019-09-11 LAB — B2 MICROGLOB SERPL-MCNC: 2.2 MG/L (ref 0.6–2.4)

## 2019-09-17 DIAGNOSIS — Z00.00 PREVENTATIVE HEALTH CARE: ICD-10-CM

## 2019-09-17 RX ORDER — ACYCLOVIR 400 MG/1
400 TABLET ORAL 2 TIMES DAILY
Qty: 60 TABLET | Refills: 5 | Status: SHIPPED | OUTPATIENT
Start: 2019-09-17 | End: 2020-03-11 | Stop reason: SDUPTHER

## 2019-09-24 ENCOUNTER — HOSPITAL ENCOUNTER (OUTPATIENT)
Dept: INFUSION CENTER | Facility: CLINIC | Age: 64
Discharge: HOME | End: 2019-09-24
Payer: MEDICARE

## 2019-09-24 VITALS
DIASTOLIC BLOOD PRESSURE: 82 MMHG | HEART RATE: 76 BPM | TEMPERATURE: 98.4 F | SYSTOLIC BLOOD PRESSURE: 151 MMHG | RESPIRATION RATE: 18 BRPM

## 2019-09-24 DIAGNOSIS — C91.10 CLL (CHRONIC LYMPHOCYTIC LEUKEMIA) (HCC): Primary | ICD-10-CM

## 2019-09-24 DIAGNOSIS — D80.1 HYPOGAMMAGLOBULINEMIA, ACQUIRED (HCC): ICD-10-CM

## 2019-09-24 PROCEDURE — 96365 THER/PROPH/DIAG IV INF INIT: CPT

## 2019-09-24 PROCEDURE — 96367 TX/PROPH/DG ADDL SEQ IV INF: CPT

## 2019-09-24 PROCEDURE — 96366 THER/PROPH/DIAG IV INF ADDON: CPT

## 2019-09-24 PROCEDURE — 96375 TX/PRO/DX INJ NEW DRUG ADDON: CPT

## 2019-09-24 RX ORDER — SODIUM CHLORIDE 9 MG/ML
20 INJECTION, SOLUTION INTRAVENOUS ONCE
Status: CANCELLED | OUTPATIENT
Start: 2019-10-22

## 2019-09-24 RX ORDER — SODIUM CHLORIDE 9 MG/ML
20 INJECTION, SOLUTION INTRAVENOUS ONCE
Status: COMPLETED | OUTPATIENT
Start: 2019-09-24 | End: 2019-09-24

## 2019-09-24 RX ORDER — ACETAMINOPHEN 325 MG/1
650 TABLET ORAL ONCE
Status: CANCELLED | OUTPATIENT
Start: 2019-10-22

## 2019-09-24 RX ORDER — ACETAMINOPHEN 325 MG/1
650 TABLET ORAL ONCE
Status: COMPLETED | OUTPATIENT
Start: 2019-09-24 | End: 2019-09-24

## 2019-09-24 RX ADMIN — Medication 20 G: at 08:44

## 2019-09-24 RX ADMIN — DIPHENHYDRAMINE HYDROCHLORIDE 12.5 MG: 50 INJECTION, SOLUTION INTRAMUSCULAR; INTRAVENOUS at 08:23

## 2019-09-24 RX ADMIN — ACETAMINOPHEN 650 MG: 325 TABLET, FILM COATED ORAL at 08:09

## 2019-09-24 RX ADMIN — SODIUM CHLORIDE 20 ML/HR: 0.9 INJECTION, SOLUTION INTRAVENOUS at 08:19

## 2019-09-24 RX ADMIN — HYDROCORTISONE SODIUM SUCCINATE 100 MG: 100 INJECTION, POWDER, FOR SOLUTION INTRAMUSCULAR; INTRAVENOUS at 08:21

## 2019-10-07 ENCOUNTER — OFFICE VISIT (OUTPATIENT)
Dept: ENDOCRINOLOGY | Facility: CLINIC | Age: 64
End: 2019-10-07
Payer: MEDICARE

## 2019-10-07 VITALS
HEART RATE: 73 BPM | WEIGHT: 236.8 LBS | HEIGHT: 72 IN | DIASTOLIC BLOOD PRESSURE: 82 MMHG | BODY MASS INDEX: 32.07 KG/M2 | SYSTOLIC BLOOD PRESSURE: 150 MMHG

## 2019-10-07 DIAGNOSIS — E78.2 MIXED HYPERLIPIDEMIA: ICD-10-CM

## 2019-10-07 DIAGNOSIS — I10 ESSENTIAL HYPERTENSION: ICD-10-CM

## 2019-10-07 DIAGNOSIS — E09.9 STEROID-INDUCED DIABETES (HCC): Primary | ICD-10-CM

## 2019-10-07 DIAGNOSIS — T38.0X5A STEROID-INDUCED DIABETES (HCC): Primary | ICD-10-CM

## 2019-10-07 DIAGNOSIS — C91.10 CLL (CHRONIC LYMPHOCYTIC LEUKEMIA) (HCC): ICD-10-CM

## 2019-10-07 PROCEDURE — 99214 OFFICE O/P EST MOD 30 MIN: CPT | Performed by: INTERNAL MEDICINE

## 2019-10-07 RX ORDER — ACETAMINOPHEN 325 MG/1
650 TABLET ORAL ONCE
Status: CANCELLED | OUTPATIENT
Start: 2019-10-08

## 2019-10-07 RX ORDER — LOSARTAN POTASSIUM 100 MG/1
100 TABLET ORAL DAILY
Qty: 30 TABLET | Refills: 6 | Status: SHIPPED | OUTPATIENT
Start: 2019-10-07 | End: 2020-01-20 | Stop reason: SDUPTHER

## 2019-10-07 RX ORDER — SODIUM CHLORIDE 9 MG/ML
20 INJECTION, SOLUTION INTRAVENOUS ONCE
Status: CANCELLED | OUTPATIENT
Start: 2019-10-08

## 2019-10-07 NOTE — ASSESSMENT & PLAN NOTE
Lab Results   Component Value Date    HGBA1C 6 7 (H) 01/29/2019   He will continue  Humalog as needed on the day of  chemo-will check hemoglobin A1c

## 2019-10-07 NOTE — PROGRESS NOTES
For all paste the top Coleman Vargas 71 y o  male MRN: [id number]    Encounter: [id number]      Assessment/Plan     Problem List Items Addressed This Visit        Endocrine    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[facility] [address] ) - Primary       Lab Results   Component Value Date    HGBA1C 6 7 (H) 01/29/2019   He will continue  Humalog as needed on the day of  chemo-will check hemoglobin A1c         Relevant Orders    HEMOGLOBIN A1C W/ EAG ESTIMATION Lab Collect       Cardiovascular and Mediastinum    Hypertension     Blood pressure above goal-increase losartan to 100 mg daily  He gets CMP on a monthly basis         Relevant Medications    losartan (COZAAR) 100 MG tablet       Other    CLL [facility]chronic lymphocytic [facility]) [facility] )    Hyperlipidemia        CC: Diabetes    History of Present Illness     HPI:  69-year-old male with history of steroid induced diabetes with long-term insulin use 2017   currently on Prednisone 5 mg daily   receives Chemo once a month - days of chemo take insulin 5-10 units that  day   May take some insulin the day after   No polyuria , polydipsia , weight gain -15 lbs in the past year  No blurry vision , occasional numbness and tingling in feet  Review of Systems   Constitutional: Positive for fatigue  Negative for unexpected weight change  Eyes: Negative for visual disturbance  Respiratory: Positive for cough  Negative for shortness of breath  Cardiovascular: Negative for palpitations and leg swelling  Gastrointestinal: Negative for constipation, diarrhea, nausea and vomiting  Endocrine: Negative for polydipsia and polyuria  Musculoskeletal: Positive for arthralgias  Negative for gait problem and myalgias  Skin: Negative for pallor, rash and wound  Psychiatric/Behavioral: Positive for sleep disturbance  All other systems reviewed and are negative        Historical Information   Past Medical History:   Diagnosis Date    Anemia     Arthritis     CAD (coronary artery disease) 2004    Cellulitis of scalp     CKD (chronic kidney disease) stage 3, GFR 30-59 ml/min (HCC)     CLL (chronic lymphocytic leukemia) (HCC)     COPD (chronic obstructive pulmonary disease) ([facility] [address] )     [facility]     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    Dyslipidemia     Edema extremities     Esophageal ulcer     H/O blood clots     Hemolytic anemia (HCC)     Hiatal hernia     History of TIA (transient ischemic attack)     Hyperlipidemia     Hypertension     Listeria infection 2018    Multiple gastric ulcers     [facility] 2004    acute    Neutropenia (HCC)     Obese     Recurrent sinusitis     Sleep apnea     Thrombocytopenia (HCC)     Vertigo      Past Surgical History:   Procedure Laterality Date    ARTHROSCOPIC REPAIR ACL      lt knee    CARDIAC SURGERY      cardiac cath with stent    FOOT SURGERY      IR PORTACATHETERGRAM  5/17/2019    KNEE ARTHROSCOPY      OTHER SURGICAL HISTORY      cardiac cath lesion 1, 1st adjunct treat device: stent    PORTACATH PLACEMENT      VA ESOPHAGOGASTRODUODENOSCOPY TRANSORAL DIAGNOSTIC N/A 10/5/2017    Procedure: ESOPHAGOGASTRODUODENOSCOPY (EGD) with bx;  Surgeon: Jose Casas DO;  [facility]; Service: Gastroenterology    VA ESOPHAGOGASTRODUODENOSCOPY TRANSORAL DIAGNOSTIC N/A 3/8/2018    Procedure: ESOPHAGOGASTRODUODENOSCOPY (EGD) with biopsy;  Surgeon: Jose Casas DO;  [facility]; Service: Gastroenterology    VA ESOPHAGOGASTRODUODENOSCOPY TRANSORAL DIAGNOSTIC N/A 6/20/2018    Procedure: ESOPHAGOGASTRODUODENOSCOPY (EGD) with Dilation;  Surgeon: Jose Casas DO;  [facility]; Service: Gastroenterology    VA ESOPHAGOGASTRODUODENOSCOPY TRANSORAL DIAGNOSTIC N/A 10/18/2018    Procedure: ESOPHAGOGASTRODUODENOSCOPY (EGD) with dilation;  Surgeon: Harvinder Ruiz MD;  [facility];   Service: Gastroenterology    VA ESOPHAGOSCOPY FLEXIBLE TRANSORAL WITH BIOPSY N/A 9/2/2016    Procedure: ESOPHAGOGASTRODUODENOSCOPY (EGD); ESOPHAGEAL DILATION; ESOPHAGEAL BIOPSY;  Surgeon: Mireille Ware MD;  [facility];  Service: Thoracic    TONSILLECTOMY      UPPER GASTROINTESTINAL ENDOSCOPY      x 6     Social History   Social History     Substance and Sexual Activity   Alcohol Use Yes    Alcohol/week: 2 0 standard drinks    Types: 2 Cans of beer per week    Comment: social use as per [employer]     Social History     Substance and Sexual Activity   Drug Use No     Social History     Tobacco Use   Smoking Status Former Smoker    Packs/day: 1 00    Years: 10 00    Pack years: 10 00    Types: Cigarettes    Last attempt to quit: 2015    Years since quittin 6   Smokeless Tobacco Never Used     Family History:   Family History   Problem Relation Age of Onset    Leukemia Mother         chronic    Colon polyps Mother         sidmoid    Parkinsonism Father        Meds/Allergies   Current Outpatient Medications   Medication Sig Dispense Refill    acyclovir (ZOVIRAX) 400 MG tablet Take 1 tablet (400 mg total) by mouth 2 (two) times a day 60 tablet 5    aspirin 81 MG tablet Take 81 mg by mouth every other day Every other day       citalopram (CeleXA) 40 mg tablet Take 1 tablet (40 mg total) by mouth daily 90 tablet 3    fenofibrate (TRICOR) 145 mg tablet Take 1 tablet (145 mg total) by mouth daily 90 tablet 3    folic acid (FOLVITE) 1 mg tablet Take 1 mg by mouth daily      furosemide (LASIX) 40 mg tablet furosemide 40 mg tablet      gabapentin (NEURONTIN) 600 MG tablet TAKE 1 TABLET (600 MG TOTAL) BY MOUTH DAILY 30 tablet 2    glucose monitoring kit (FREESTYLE) monitoring kit 1 each by Does not apply route as needed ( ) E 11 9 1 each 0    Ibrutinib 140 MG TABS Take 140 mg by mouth daily for 148 days 30 tablet 5    Lancets (FREESTYLE) lancets Use as instructed--test 2 times a day    E 11 9 100 each 0    multivitamin (THERAGRAN) TABS Take 1 tablet by mouth daily      obinutuzumab (GAZYVA) 1000 mg/40 mL SOLN Infuse into a venous catheter      oxyCODONE (ROXICODONE) 10 MG TABS Take 1 tablet (10 mg total) by mouth every 6 (six) hours as needed for moderate pain For ongoing pain controlMax Daily Amount: 40 mg 90 tablet 0    pantoprazole (PROTONIX) 40 mg tablet Take 1 tablet (40 mg total) by mouth 2 (two) times a day for 30 days 60 tablet 6    Potassium (POTASSIMIN PO) Take 20 mEq by mouth daily      predniSONE 5 mg tablet TAKE 1 TO 2 TABLETS EVERY DAY AS DIRECTED WITH FOOD 30 tablet 4    sodium chloride, PF, 0 9 % 10 mL by Intracatheter route see administration instructions 10mL into port before and after ampicillin doses  0    VENTOLIN  (90 Base) MCG/ACT inhaler Inhale 2 puffs 4 (four) times a day as needed for wheezing 2 Inhaler 3    insulin lispro (HUMALOG KWIKPEN) 100 units/mL injection pen Use 5-10 units before meals as needed for steroid induced hyperglycemia (Patient not taking: Reported on 10/7/2019) 5 pen 1    Insulin Syringe-Needle U-100 30G X 1/2" 0 3 ML MISC by Does not apply route 2 (two) times a day (Patient not taking: Reported on 10/7/2019) 100 each 6    losartan (COZAAR) 100 MG tablet Take 1 tablet (100 mg total) by mouth daily 30 tablet 6     No current facility-administered medications for this visit  Allergies   Allergen Reactions    Heparin Other (See Comments)     Other reaction(s): Blood Disorders  clot    Macrolides And Ketolides Other (See Comments)     Interacts with other meds he is taking       Objective   Vitals: Blood pressure 150/82, pulse 73, height 6' (1 829 m), weight 107 kg (236 lb 12 8 oz)  Physical Exam   Constitutional: He is oriented to person, place, and time  He appears well-developed and well-nourished  No distress  HENT:   Head: Normocephalic and atraumatic  Eyes: EOM are normal  No scleral icterus  Neck: Normal range of motion  Neck supple  Cardiovascular: Normal rate, regular rhythm and normal heart sounds     No murmur heard  Pulmonary/Chest: Effort normal and breath sounds normal  No respiratory distress  He has no wheezes  He has no rales  Abdominal: Soft  Bowel sounds are normal  He exhibits no distension  There is no tenderness  There is no guarding  Musculoskeletal: Normal range of motion  He exhibits no edema or deformity  Lymphadenopathy:     He has no cervical adenopathy  Neurological: He is alert and oriented to person, place, and time  Skin: Skin is warm and dry  Psychiatric: He has a normal mood and affect  His behavior is normal  Judgment and thought content normal        The history was obtained from the review of the chart, patient      Lab Results:   Lab Results   Component Value Date/Time    Hemoglobin A1C 6 7 (H) 01/29/2019 08:38 AM    WBC 4 31 09/10/2019 08:35 AM    WBC 4 74 08/13/2019 08:13 AM    WBC 3 83 (L) 07/16/2019 08:33 AM    Hemoglobin 14 2 09/10/2019 08:35 AM    Hemoglobin 14 4 08/13/2019 08:13 AM    Hemoglobin 14 1 07/16/2019 08:33 AM    Hematocrit 40 8 09/10/2019 08:35 AM    Hematocrit 41 1 08/13/2019 08:13 AM    Hematocrit 40 8 07/16/2019 08:33 AM    MCV 88 09/10/2019 08:35 AM    MCV 88 08/13/2019 08:13 AM    MCV 90 07/16/2019 08:33 AM    Platelets 569 (L) 76/30/3906 08:35 AM    Platelets 590 (L) 10/38/8051 08:13 AM    Platelets 104 (L) 84/61/3220 08:33 AM    BUN 19 09/10/2019 08:35 AM    BUN 26 (H) 08/13/2019 08:13 AM    BUN 19 07/16/2019 08:33 AM    Potassium 3 8 09/10/2019 08:35 AM    Potassium 3 9 08/13/2019 08:13 AM    Potassium 3 8 07/16/2019 08:33 AM    Chloride 108 09/10/2019 08:35 AM    Chloride 110 (H) 08/13/2019 08:13 AM    Chloride 106 07/16/2019 08:33 AM    CO2 28 09/10/2019 08:35 AM    CO2 26 08/13/2019 08:13 AM    CO2 29 07/16/2019 08:33 AM    Creatinine 0 90 09/10/2019 08:35 AM    Creatinine 1 20 08/13/2019 08:13 AM    Creatinine 1 20 07/16/2019 08:33 AM    AST 38 09/10/2019 08:35 AM    AST 33 08/13/2019 08:13 AM    AST 34 07/16/2019 08:33 AM    ALT 46 09/10/2019 08:35 AM    ALT 30 08/13/2019 08:13 AM    ALT 42 07/16/2019 08:33 AM    Albumin 3 8 09/10/2019 08:35 AM    Albumin 3 8 08/13/2019 08:13 AM    Albumin 3 6 07/16/2019 08:33 AM    HDL, Direct 33 (L) 06/18/2019 08:27 AM    HDL, Direct 31 (L) 01/29/2019 08:38 AM    Triglycerides 135 06/18/2019 08:27 AM    Triglycerides 135 01/29/2019 08:38 AM         Portions of the record may have been created with voice recognition software  Occasional wrong word or "sound a like" substitutions may have occurred due to the inherent limitations of voice recognition software  Read the chart carefully and recognize, using context, where substitutions have occurred

## 2019-10-08 ENCOUNTER — HOSPITAL ENCOUNTER (OUTPATIENT)
Dept: INFUSION CENTER | Facility: CLINIC | Age: 64
Discharge: HOME | End: 2019-10-08
Payer: MEDICARE

## 2019-10-08 ENCOUNTER — LAB (OUTPATIENT)
Dept: LAB | Facility: CLINIC | Age: 64
End: 2019-10-08
Payer: MEDICARE

## 2019-10-08 ENCOUNTER — TELEPHONE (OUTPATIENT)
Dept: ENDOCRINOLOGY | Facility: CLINIC | Age: 64
End: 2019-10-08

## 2019-10-08 VITALS
RESPIRATION RATE: 18 BRPM | SYSTOLIC BLOOD PRESSURE: 162 MMHG | WEIGHT: 237.22 LBS | HEART RATE: 77 BPM | HEIGHT: 73 IN | TEMPERATURE: 98.2 F | DIASTOLIC BLOOD PRESSURE: 85 MMHG | BODY MASS INDEX: 31.44 KG/M2

## 2019-10-08 DIAGNOSIS — T38.0X5A STEROID-INDUCED DIABETES (HCC): ICD-10-CM

## 2019-10-08 DIAGNOSIS — E09.9 STEROID-INDUCED DIABETES (HCC): ICD-10-CM

## 2019-10-08 DIAGNOSIS — C91.10 CLL (CHRONIC LYMPHOCYTIC LEUKEMIA) (HCC): Primary | ICD-10-CM

## 2019-10-08 DIAGNOSIS — D80.1 HYPOGAMMAGLOBULINEMIA, ACQUIRED (HCC): ICD-10-CM

## 2019-10-08 LAB
ALBUMIN SERPL BCP-MCNC: 3.7 G/DL (ref 3.5–5.7)
ALP SERPL-CCNC: 63 U/L (ref 46–116)
ALT SERPL W P-5'-P-CCNC: 42 U/L (ref 12–78)
ANION GAP SERPL CALCULATED.3IONS-SCNC: 1 MMOL/L (ref 4–13)
AST SERPL W P-5'-P-CCNC: 35 U/L (ref 5–45)
BASOPHILS # BLD MANUAL: 0 THOUSAND/UL (ref 0–0.1)
BASOPHILS NFR MAR MANUAL: 0 % (ref 0–1)
BILIRUB SERPL-MCNC: 0.6 MG/DL (ref 0.2–1)
BUN SERPL-MCNC: 16 MG/DL (ref 5–25)
CALCIUM SERPL-MCNC: 9.3 MG/DL (ref 8.3–10.1)
CHLORIDE SERPL-SCNC: 110 MMOL/L (ref 98–108)
CO2 SERPL-SCNC: 28 MMOL/L (ref 21–32)
CREAT SERPL-MCNC: 1.1 MG/DL (ref 0.6–1.3)
EOSINOPHIL # BLD MANUAL: 0.09 THOUSAND/UL (ref 0–0.4)
EOSINOPHIL NFR BLD MANUAL: 2 % (ref 0–6)
ERYTHROCYTE [DISTWIDTH] IN BLOOD BY AUTOMATED COUNT: 13.9 % (ref 11.6–15.1)
EST. AVERAGE GLUCOSE BLD GHB EST-MCNC: 143 MG/DL
GFR SERPL CREATININE-BSD FRML MDRD: 70 ML/MIN/1.73SQ M
GLUCOSE SERPL-MCNC: 120 MG/DL (ref 65–140)
HBA1C MFR BLD: 6.6 % (ref 4.2–6.3)
HCT VFR BLD AUTO: 41.9 % (ref 36.5–49.3)
HGB BLD-MCNC: 14 G/DL (ref 12–17)
HGB RETIC QN AUTO: 34 PG (ref 30–38.3)
IGG SERPL-MCNC: 498 MG/DL (ref 700–1600)
IMM RETICS NFR: 10.4 % (ref 0–14)
LDH SERPL-CCNC: 211 U/L (ref 81–234)
LYMPHOCYTES # BLD AUTO: 0.88 THOUSAND/UL (ref 0.6–4.47)
LYMPHOCYTES # BLD AUTO: 19 % (ref 14–44)
MCH RBC QN AUTO: 30.2 PG (ref 26.8–34.3)
MCHC RBC AUTO-ENTMCNC: 33.4 G/DL (ref 31.4–37.4)
MCV RBC AUTO: 91 FL (ref 82–98)
MONOCYTES # BLD AUTO: 0.51 THOUSAND/UL (ref 0–1.22)
MONOCYTES NFR BLD: 11 % (ref 4–12)
NEUTROPHILS # BLD MANUAL: 3.15 THOUSAND/UL (ref 1.85–7.62)
NEUTS SEG NFR BLD AUTO: 68 % (ref 43–75)
PLATELET # BLD AUTO: 123 THOUSANDS/UL (ref 149–390)
PLATELET BLD QL SMEAR: ABNORMAL
PMV BLD AUTO: 11.5 FL (ref 8.9–12.7)
POTASSIUM SERPL-SCNC: 4.1 MMOL/L (ref 3.5–5.3)
PROT SERPL-MCNC: 6.1 G/DL (ref 6.4–8.2)
RBC # BLD AUTO: 4.63 MILLION/UL (ref 3.88–5.62)
RBC MORPH BLD: NORMAL
RETICS # AUTO: ABNORMAL 10*3/UL (ref 14356–105094)
RETICS # CALC: 2.35 % (ref 0.37–1.87)
SODIUM SERPL-SCNC: 139 MMOL/L (ref 136–145)
TOTAL CELLS COUNTED SPEC: 100
URATE SERPL-MCNC: 3.4 MG/DL (ref 4.2–8)
WBC # BLD AUTO: 4.63 THOUSAND/UL (ref 4.31–10.16)

## 2019-10-08 PROCEDURE — 36415 COLL VENOUS BLD VENIPUNCTURE: CPT

## 2019-10-08 PROCEDURE — 85007 BL SMEAR W/DIFF WBC COUNT: CPT | Performed by: INTERNAL MEDICINE

## 2019-10-08 PROCEDURE — 82232 ASSAY OF BETA-2 PROTEIN: CPT | Performed by: INTERNAL MEDICINE

## 2019-10-08 PROCEDURE — 96413 CHEMO IV INFUSION 1 HR: CPT

## 2019-10-08 PROCEDURE — 82784 ASSAY IGA/IGD/IGG/IGM EACH: CPT | Performed by: INTERNAL MEDICINE

## 2019-10-08 PROCEDURE — 85027 COMPLETE CBC AUTOMATED: CPT | Performed by: INTERNAL MEDICINE

## 2019-10-08 PROCEDURE — 96415 CHEMO IV INFUSION ADDL HR: CPT

## 2019-10-08 PROCEDURE — 85046 RETICYTE/HGB CONCENTRATE: CPT | Performed by: INTERNAL MEDICINE

## 2019-10-08 PROCEDURE — 83615 LACTATE (LD) (LDH) ENZYME: CPT | Performed by: INTERNAL MEDICINE

## 2019-10-08 PROCEDURE — 80053 COMPREHEN METABOLIC PANEL: CPT | Performed by: INTERNAL MEDICINE

## 2019-10-08 PROCEDURE — 84550 ASSAY OF BLOOD/URIC ACID: CPT | Performed by: INTERNAL MEDICINE

## 2019-10-08 PROCEDURE — 96367 TX/PROPH/DG ADDL SEQ IV INF: CPT

## 2019-10-08 PROCEDURE — 83036 HEMOGLOBIN GLYCOSYLATED A1C: CPT

## 2019-10-08 RX ORDER — ACETAMINOPHEN 325 MG/1
650 TABLET ORAL ONCE
Status: COMPLETED | OUTPATIENT
Start: 2019-10-08 | End: 2019-10-08

## 2019-10-08 RX ORDER — SODIUM CHLORIDE 9 MG/ML
20 INJECTION, SOLUTION INTRAVENOUS ONCE
Status: COMPLETED | OUTPATIENT
Start: 2019-10-08 | End: 2019-10-08

## 2019-10-08 RX ADMIN — DEXAMETHASONE SODIUM PHOSPHATE 20 MG: 10 INJECTION, SOLUTION INTRAMUSCULAR; INTRAVENOUS at 09:09

## 2019-10-08 RX ADMIN — SODIUM CHLORIDE 20 ML/HR: 0.9 INJECTION, SOLUTION INTRAVENOUS at 09:09

## 2019-10-08 RX ADMIN — OBINUTUZUMAB 1000 MG: 1000 INJECTION, SOLUTION, CONCENTRATE INTRAVENOUS at 09:59

## 2019-10-08 RX ADMIN — ACETAMINOPHEN 650 MG: 325 TABLET, FILM COATED ORAL at 09:08

## 2019-10-08 RX ADMIN — DIPHENHYDRAMINE HYDROCHLORIDE 25 MG: 50 INJECTION, SOLUTION INTRAMUSCULAR; INTRAVENOUS at 09:37

## 2019-10-08 NOTE — PLAN OF CARE
Problem: Potential for Falls  Goal: Patient will remain free of falls  Description  INTERVENTIONS:  - Assess patient frequently for physical needs  -  Identify cognitive and physical deficits and behaviors that affect risk of falls    -  [place] fall precautions as indicated by assessment   - Educate patient/family on patient safety including physical limitations  - Instruct patient to call for assistance with activity based on assessment  - Modify environment to reduce risk of injury  - Consider OT/PT consult to assist with strengthening/mobility  Outcome: Progressing

## 2019-10-08 NOTE — TELEPHONE ENCOUNTER
----- Message from Owen Jones MD sent at 10/8/2019  2:11 PM EDT -----  Please call the patient regarding labs - A1C 6 6 at goal

## 2019-10-08 NOTE — PROGRESS NOTES
Patient to the unit for Gayzva infusion  States feeling exceptionally well today  Labs drawn, all parameters met  Tolerated Gayzva infusion without adverse reaction  Declined AVS   Aware of future appointments

## 2019-10-09 RX ORDER — SODIUM CHLORIDE 9 MG/ML
20 INJECTION, SOLUTION INTRAVENOUS ONCE
Status: CANCELLED | OUTPATIENT
Start: 2019-11-05

## 2019-10-09 RX ORDER — ACETAMINOPHEN 325 MG/1
650 TABLET ORAL ONCE
Status: CANCELLED | OUTPATIENT
Start: 2019-11-05

## 2019-10-10 LAB — B2 MICROGLOB SERPL-MCNC: 2.2 MG/L (ref 0.6–2.4)

## 2019-10-11 ENCOUNTER — DOCUMENTATION (OUTPATIENT)
Dept: HEMATOLOGY ONCOLOGY | Facility: CLINIC | Age: 64
End: 2019-10-11

## 2019-10-11 NOTE — PROGRESS NOTES
10-11-19    10-11-19    2020 Funding-[facility]    For patient Edis Lopez  [birth year]    Patient has been re-enrolled in the [address] [place] [address] for free drug  Eff 1-1-20  Thru 12-31-20    [facility] will continue to ship medication to the patient      EPIC Noted,

## 2019-10-22 ENCOUNTER — HOSPITAL ENCOUNTER (OUTPATIENT)
Dept: INFUSION CENTER | Facility: CLINIC | Age: 64
Discharge: HOME | End: 2019-10-22
Payer: MEDICARE

## 2019-10-22 VITALS
DIASTOLIC BLOOD PRESSURE: 87 MMHG | RESPIRATION RATE: 18 BRPM | TEMPERATURE: 98.5 F | HEART RATE: 69 BPM | SYSTOLIC BLOOD PRESSURE: 150 MMHG

## 2019-10-22 DIAGNOSIS — D80.1 HYPOGAMMAGLOBULINEMIA, ACQUIRED (HCC): ICD-10-CM

## 2019-10-22 DIAGNOSIS — C91.10 CLL (CHRONIC LYMPHOCYTIC LEUKEMIA) (HCC): Primary | ICD-10-CM

## 2019-10-22 PROCEDURE — 96375 TX/PRO/DX INJ NEW DRUG ADDON: CPT

## 2019-10-22 PROCEDURE — 96367 TX/PROPH/DG ADDL SEQ IV INF: CPT

## 2019-10-22 PROCEDURE — 96365 THER/PROPH/DIAG IV INF INIT: CPT

## 2019-10-22 PROCEDURE — 96366 THER/PROPH/DIAG IV INF ADDON: CPT

## 2019-10-22 RX ORDER — ACETAMINOPHEN 325 MG/1
650 TABLET ORAL ONCE
Status: COMPLETED | OUTPATIENT
Start: 2019-10-22 | End: 2019-10-22

## 2019-10-22 RX ORDER — SODIUM CHLORIDE 9 MG/ML
20 INJECTION, SOLUTION INTRAVENOUS ONCE
Status: COMPLETED | OUTPATIENT
Start: 2019-10-22 | End: 2019-10-22

## 2019-10-22 RX ORDER — ACETAMINOPHEN 325 MG/1
650 TABLET ORAL ONCE
Status: CANCELLED | OUTPATIENT
Start: 2019-11-19

## 2019-10-22 RX ORDER — SODIUM CHLORIDE 9 MG/ML
20 INJECTION, SOLUTION INTRAVENOUS ONCE
Status: CANCELLED | OUTPATIENT
Start: 2019-11-19

## 2019-10-22 RX ADMIN — DIPHENHYDRAMINE HYDROCHLORIDE 12.5 MG: 50 INJECTION, SOLUTION INTRAMUSCULAR; INTRAVENOUS at 08:25

## 2019-10-22 RX ADMIN — Medication 22.5 G: at 09:00

## 2019-10-22 RX ADMIN — ACETAMINOPHEN 650 MG: 325 TABLET, FILM COATED ORAL at 08:13

## 2019-10-22 RX ADMIN — SODIUM CHLORIDE 20 ML/HR: 0.9 INJECTION, SOLUTION INTRAVENOUS at 08:20

## 2019-10-22 RX ADMIN — HYDROCORTISONE SODIUM SUCCINATE 100 MG: 100 INJECTION, POWDER, FOR SOLUTION INTRAMUSCULAR; INTRAVENOUS at 08:20

## 2019-10-22 NOTE — PLAN OF CARE
Problem: Potential for Falls  Goal: Patient will remain free of falls  Description  INTERVENTIONS:  - Assess patient frequently for physical needs  -  Identify cognitive and physical deficits and behaviors that affect risk of falls    -  [place] fall precautions as indicated by assessment   - Educate patient/family on patient safety including physical limitations  - Instruct patient to call for assistance with activity based on assessment  - Modify environment to reduce risk of injury  - Consider OT/PT consult to assist with strengthening/mobility  Outcome: Progressing

## 2019-11-02 DIAGNOSIS — K21.9 GASTROESOPHAGEAL REFLUX DISEASE WITHOUT ESOPHAGITIS: ICD-10-CM

## 2019-11-05 ENCOUNTER — HOSPITAL ENCOUNTER (OUTPATIENT)
Dept: INFUSION CENTER | Facility: CLINIC | Age: 64
Discharge: HOME | End: 2019-11-05
Payer: MEDICARE

## 2019-11-05 VITALS
HEART RATE: 65 BPM | RESPIRATION RATE: 18 BRPM | DIASTOLIC BLOOD PRESSURE: 84 MMHG | SYSTOLIC BLOOD PRESSURE: 132 MMHG | WEIGHT: 235.45 LBS | BODY MASS INDEX: 31.44 KG/M2 | TEMPERATURE: 98.3 F

## 2019-11-05 DIAGNOSIS — D80.1 HYPOGAMMAGLOBULINEMIA, ACQUIRED (HCC): ICD-10-CM

## 2019-11-05 DIAGNOSIS — C91.10 CLL (CHRONIC LYMPHOCYTIC LEUKEMIA) (HCC): Primary | ICD-10-CM

## 2019-11-05 LAB
ALBUMIN SERPL BCP-MCNC: 3.6 G/DL (ref 3.5–5.7)
ALP SERPL-CCNC: 59 U/L (ref 46–116)
ALT SERPL W P-5'-P-CCNC: 35 U/L (ref 12–78)
ANION GAP SERPL CALCULATED.3IONS-SCNC: 6 MMOL/L (ref 4–13)
AST SERPL W P-5'-P-CCNC: 34 U/L (ref 5–45)
BASOPHILS # BLD MANUAL: 0 THOUSAND/UL (ref 0–0.1)
BASOPHILS NFR MAR MANUAL: 0 % (ref 0–1)
BILIRUB SERPL-MCNC: 0.5 MG/DL (ref 0.2–1)
BUN SERPL-MCNC: 20 MG/DL (ref 5–25)
CALCIUM SERPL-MCNC: 9.5 MG/DL (ref 8.3–10.1)
CHLORIDE SERPL-SCNC: 105 MMOL/L (ref 98–108)
CO2 SERPL-SCNC: 27 MMOL/L (ref 21–32)
CREAT SERPL-MCNC: 0.9 MG/DL (ref 0.6–1.3)
EOSINOPHIL # BLD MANUAL: 0.1 THOUSAND/UL (ref 0–0.4)
EOSINOPHIL NFR BLD MANUAL: 3 % (ref 0–6)
ERYTHROCYTE [DISTWIDTH] IN BLOOD BY AUTOMATED COUNT: 14 % (ref 11.6–15.1)
GFR SERPL CREATININE-BSD FRML MDRD: 89 ML/MIN/1.73SQ M
GLUCOSE SERPL-MCNC: 157 MG/DL (ref 65–140)
HCT VFR BLD AUTO: 41.9 % (ref 36.5–49.3)
HGB BLD-MCNC: 13.9 G/DL (ref 12–17)
HGB RETIC QN AUTO: 34.6 PG (ref 30–38.3)
IGG SERPL-MCNC: 496 MG/DL (ref 700–1600)
IMM RETICS NFR: 10 % (ref 0–14)
LDH SERPL-CCNC: 200 U/L (ref 81–234)
LYMPHOCYTES # BLD AUTO: 0.52 THOUSAND/UL (ref 0.6–4.47)
LYMPHOCYTES # BLD AUTO: 15 % (ref 14–44)
MCH RBC QN AUTO: 30 PG (ref 26.8–34.3)
MCHC RBC AUTO-ENTMCNC: 33.2 G/DL (ref 31.4–37.4)
MCV RBC AUTO: 91 FL (ref 82–98)
MONOCYTES # BLD AUTO: 0.31 THOUSAND/UL (ref 0–1.22)
MONOCYTES NFR BLD: 9 % (ref 4–12)
NEUTROPHILS # BLD MANUAL: 2.53 THOUSAND/UL (ref 1.85–7.62)
NEUTS BAND NFR BLD MANUAL: 4 % (ref 0–8)
NEUTS SEG NFR BLD AUTO: 69 % (ref 43–75)
PLATELET # BLD AUTO: 132 THOUSANDS/UL (ref 149–390)
PLATELET BLD QL SMEAR: ABNORMAL
PMV BLD AUTO: 11 FL (ref 8.9–12.7)
POTASSIUM SERPL-SCNC: 4.2 MMOL/L (ref 3.5–5.3)
PROT SERPL-MCNC: 6.1 G/DL (ref 6.4–8.2)
RBC # BLD AUTO: 4.63 MILLION/UL (ref 3.88–5.62)
RBC MORPH BLD: NORMAL
RETICS # AUTO: ABNORMAL 10*3/UL (ref 14356–105094)
RETICS # CALC: 2.02 % (ref 0.37–1.87)
SODIUM SERPL-SCNC: 138 MMOL/L (ref 136–145)
TOTAL CELLS COUNTED SPEC: 100
URATE SERPL-MCNC: 3.9 MG/DL (ref 4.2–8)
WBC # BLD AUTO: 3.47 THOUSAND/UL (ref 4.31–10.16)

## 2019-11-05 PROCEDURE — 96415 CHEMO IV INFUSION ADDL HR: CPT

## 2019-11-05 PROCEDURE — 96413 CHEMO IV INFUSION 1 HR: CPT

## 2019-11-05 PROCEDURE — 82232 ASSAY OF BETA-2 PROTEIN: CPT | Performed by: INTERNAL MEDICINE

## 2019-11-05 PROCEDURE — 83615 LACTATE (LD) (LDH) ENZYME: CPT | Performed by: INTERNAL MEDICINE

## 2019-11-05 PROCEDURE — 80053 COMPREHEN METABOLIC PANEL: CPT | Performed by: INTERNAL MEDICINE

## 2019-11-05 PROCEDURE — 82784 ASSAY IGA/IGD/IGG/IGM EACH: CPT | Performed by: INTERNAL MEDICINE

## 2019-11-05 PROCEDURE — 84550 ASSAY OF BLOOD/URIC ACID: CPT | Performed by: INTERNAL MEDICINE

## 2019-11-05 PROCEDURE — 96367 TX/PROPH/DG ADDL SEQ IV INF: CPT

## 2019-11-05 PROCEDURE — 85007 BL SMEAR W/DIFF WBC COUNT: CPT | Performed by: INTERNAL MEDICINE

## 2019-11-05 PROCEDURE — 85046 RETICYTE/HGB CONCENTRATE: CPT | Performed by: INTERNAL MEDICINE

## 2019-11-05 PROCEDURE — 85027 COMPLETE CBC AUTOMATED: CPT | Performed by: INTERNAL MEDICINE

## 2019-11-05 RX ORDER — ACETAMINOPHEN 325 MG/1
650 TABLET ORAL ONCE
Status: COMPLETED | OUTPATIENT
Start: 2019-11-05 | End: 2019-11-05

## 2019-11-05 RX ORDER — SODIUM CHLORIDE 9 MG/ML
20 INJECTION, SOLUTION INTRAVENOUS ONCE
Status: COMPLETED | OUTPATIENT
Start: 2019-11-05 | End: 2019-11-05

## 2019-11-05 RX ADMIN — ACETAMINOPHEN 650 MG: 325 TABLET, FILM COATED ORAL at 08:29

## 2019-11-05 RX ADMIN — DIPHENHYDRAMINE HYDROCHLORIDE 25 MG: 50 INJECTION, SOLUTION INTRAMUSCULAR; INTRAVENOUS at 08:43

## 2019-11-05 RX ADMIN — OBINUTUZUMAB 1000 MG: 1000 INJECTION, SOLUTION, CONCENTRATE INTRAVENOUS at 09:12

## 2019-11-05 RX ADMIN — DEXAMETHASONE SODIUM PHOSPHATE 20 MG: 10 INJECTION, SOLUTION INTRAMUSCULAR; INTRAVENOUS at 08:25

## 2019-11-05 RX ADMIN — SODIUM CHLORIDE 20 ML/HR: 0.9 INJECTION, SOLUTION INTRAVENOUS at 08:25

## 2019-11-05 NOTE — PLAN OF CARE
Problem: Potential for Falls  Goal: Patient will remain free of falls  Description  INTERVENTIONS:  - Assess patient frequently for physical needs  -  Identify cognitive and physical deficits and behaviors that affect risk of falls    -  [place] fall precautions as indicated by assessment   - Educate patient/family on patient safety including physical limitations  - Instruct patient to call for assistance with activity based on assessment  - Modify environment to reduce risk of injury  - Consider OT/PT consult to assist with strengthening/mobility  Outcome: Progressing

## 2019-11-06 LAB — B2 MICROGLOB SERPL-MCNC: 2.3 MG/L (ref 0.6–2.4)

## 2019-11-08 RX ORDER — PANTOPRAZOLE SODIUM 40 MG/1
40 TABLET, DELAYED RELEASE ORAL 2 TIMES DAILY
Qty: 60 TABLET | Refills: 6 | Status: SHIPPED | OUTPATIENT
Start: 2019-11-08 | End: 2020-01-08

## 2019-11-11 DIAGNOSIS — K21.9 GASTROESOPHAGEAL REFLUX DISEASE WITHOUT ESOPHAGITIS: ICD-10-CM

## 2019-11-11 DIAGNOSIS — R13.19 ESOPHAGEAL DYSPHAGIA: Primary | ICD-10-CM

## 2019-11-11 RX ORDER — PANTOPRAZOLE SODIUM 40 MG/1
40 TABLET, DELAYED RELEASE ORAL DAILY
Qty: 90 TABLET | Refills: 3 | Status: SHIPPED | OUTPATIENT
Start: 2019-11-11 | End: 2020-01-08

## 2019-11-19 ENCOUNTER — HOSPITAL ENCOUNTER (OUTPATIENT)
Dept: INFUSION CENTER | Facility: CLINIC | Age: 64
Discharge: HOME | End: 2019-11-19
Payer: MEDICARE

## 2019-11-19 VITALS
SYSTOLIC BLOOD PRESSURE: 156 MMHG | HEART RATE: 85 BPM | WEIGHT: 236.11 LBS | BODY MASS INDEX: 31.53 KG/M2 | RESPIRATION RATE: 18 BRPM | DIASTOLIC BLOOD PRESSURE: 89 MMHG | TEMPERATURE: 97.6 F

## 2019-11-19 DIAGNOSIS — D80.1 HYPOGAMMAGLOBULINEMIA, ACQUIRED (HCC): ICD-10-CM

## 2019-11-19 DIAGNOSIS — C91.10 CLL (CHRONIC LYMPHOCYTIC LEUKEMIA) (HCC): Primary | ICD-10-CM

## 2019-11-19 PROCEDURE — 96366 THER/PROPH/DIAG IV INF ADDON: CPT

## 2019-11-19 PROCEDURE — 96367 TX/PROPH/DG ADDL SEQ IV INF: CPT

## 2019-11-19 PROCEDURE — 96375 TX/PRO/DX INJ NEW DRUG ADDON: CPT

## 2019-11-19 PROCEDURE — 96365 THER/PROPH/DIAG IV INF INIT: CPT

## 2019-11-19 RX ORDER — ACETAMINOPHEN 325 MG/1
650 TABLET ORAL ONCE
Status: CANCELLED | OUTPATIENT
Start: 2019-12-17

## 2019-11-19 RX ORDER — SODIUM CHLORIDE 9 MG/ML
20 INJECTION, SOLUTION INTRAVENOUS ONCE
Status: CANCELLED | OUTPATIENT
Start: 2019-12-17

## 2019-11-19 RX ORDER — ACETAMINOPHEN 325 MG/1
650 TABLET ORAL ONCE
Status: COMPLETED | OUTPATIENT
Start: 2019-11-19 | End: 2019-11-19

## 2019-11-19 RX ORDER — SODIUM CHLORIDE 9 MG/ML
20 INJECTION, SOLUTION INTRAVENOUS ONCE
Status: COMPLETED | OUTPATIENT
Start: 2019-11-19 | End: 2019-11-19

## 2019-11-19 RX ADMIN — Medication 22.5 G: at 09:02

## 2019-11-19 RX ADMIN — ACETAMINOPHEN 650 MG: 325 TABLET, FILM COATED ORAL at 08:33

## 2019-11-19 RX ADMIN — SODIUM CHLORIDE 20 ML/HR: 0.9 INJECTION, SOLUTION INTRAVENOUS at 08:33

## 2019-11-19 RX ADMIN — HYDROCORTISONE SODIUM SUCCINATE 100 MG: 100 INJECTION, POWDER, FOR SOLUTION INTRAMUSCULAR; INTRAVENOUS at 08:34

## 2019-11-19 RX ADMIN — DIPHENHYDRAMINE HYDROCHLORIDE 12.5 MG: 50 INJECTION, SOLUTION INTRAMUSCULAR; INTRAVENOUS at 08:37

## 2019-11-19 NOTE — PLAN OF CARE
Problem: INFECTION - ADULT  Goal: Absence or prevention of progression during hospitalization  Description  INTERVENTIONS:  - Assess and monitor for signs and symptoms of infection  - Monitor lab/diagnostic results  - Monitor all insertion sites, i e  indwelling lines, tubes, and drains  - Monitor endotracheal if appropriate and nasal secretions for changes in amount and color  - [place] appropriate cooling/warming therapies per order  - Administer medications as ordered  - Instruct and encourage patient and family to use good hand hygiene technique  - Identify and instruct in appropriate isolation precautions for identified infection/condition  11/19/2019 1528 by Uday Corbett RN  Outcome: Progressing  11/19/2019 1528 by Uday Corbett RN  Outcome: Progressing

## 2019-11-19 NOTE — PLAN OF CARE

## 2019-11-26 ENCOUNTER — TELEPHONE (OUTPATIENT)
Dept: HEMATOLOGY ONCOLOGY | Facility: CLINIC | Age: 64
End: 2019-11-26

## 2019-11-27 ENCOUNTER — OFFICE VISIT (OUTPATIENT)
Dept: HEMATOLOGY ONCOLOGY | Facility: CLINIC | Age: 64
End: 2019-11-27
Payer: MEDICARE

## 2019-11-27 VITALS
HEART RATE: 74 BPM | HEIGHT: 74 IN | DIASTOLIC BLOOD PRESSURE: 70 MMHG | SYSTOLIC BLOOD PRESSURE: 140 MMHG | BODY MASS INDEX: 30.67 KG/M2 | WEIGHT: 239 LBS | RESPIRATION RATE: 16 BRPM | OXYGEN SATURATION: 97 % | TEMPERATURE: 98.5 F

## 2019-11-27 DIAGNOSIS — D69.6 THROMBOCYTOPENIA (HCC): ICD-10-CM

## 2019-11-27 DIAGNOSIS — C91.10 CLL (CHRONIC LYMPHOCYTIC LEUKEMIA) (HCC): ICD-10-CM

## 2019-11-27 DIAGNOSIS — M81.0 AGE-RELATED OSTEOPOROSIS WITHOUT CURRENT PATHOLOGICAL FRACTURE: ICD-10-CM

## 2019-11-27 DIAGNOSIS — C91.10 CLL (CHRONIC LYMPHOCYTIC LEUKEMIA) (HCC): Primary | ICD-10-CM

## 2019-11-27 DIAGNOSIS — D75.82 HIT (HEPARIN-INDUCED THROMBOCYTOPENIA) (HCC): ICD-10-CM

## 2019-11-27 DIAGNOSIS — D80.1 HYPOGAMMAGLOBULINEMIA, ACQUIRED (HCC): ICD-10-CM

## 2019-11-27 DIAGNOSIS — D59.19 OTHER AUTOIMMUNE HEMOLYTIC ANEMIAS: ICD-10-CM

## 2019-11-27 PROCEDURE — 99214 OFFICE O/P EST MOD 30 MIN: CPT | Performed by: INTERNAL MEDICINE

## 2019-11-27 RX ORDER — SODIUM CHLORIDE 9 MG/ML
20 INJECTION, SOLUTION INTRAVENOUS ONCE
Status: CANCELLED | OUTPATIENT
Start: 2019-12-03

## 2019-11-27 RX ORDER — ACETAMINOPHEN 325 MG/1
650 TABLET ORAL ONCE
Status: CANCELLED | OUTPATIENT
Start: 2019-12-03

## 2019-11-27 NOTE — PROGRESS NOTES
HPI:  Patient is on Gazyva 1000 mg once a month, ibrutinib 140 mg daily, prednisone 5 mg daily, folic acid 1 mg daily, and IVIG therapy for   autoimmune hemolytic anemia , thrombocytopenia and  hypogammaglobinemia  Hematological status has improved especially platelet counts   He has increased tiredness   He has gained weight  No more flare up of arthritis  Less vascular purpura on the arms  Marla Durant is taking vitamin-D and calcium and tries to stay active to prevent worsening of 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  He will have DEXA scan  Alejandro Ratliff had been to several lines of treatments for CLL with autoimmune hemolytic anemia and thrombocytopenia  He has chronic lung disease   He has coronary artery disease and has 1 stent and he takes 81 mg aspirin daily with food  He has less exertional dyspnea and minimal nonproductive cough     He has occasionally sharp pain in right subcostal area that lasts for few seconds   History of heparin induced thrombocytopenia  CLL was diagnosed several years ago  He had been through Fludara based chemotherapy and pentostatin based chemotherapy  He had increased hemolysis when he was on chemotherapy  His most recent chemotherapy treatment was Cytoxan, Oncovin, prednisone and Rituxan and last treatment was in December 2012    In 2013 he was started on Rituxan, prednisone and folic acid because he started to [facility] again  IVIG was tried unsuccessfully so far as hemolysis is concerned  Rituxan has controlled the hemolysis    Information on the treatments from March 2017 onwards  On 3/20/17 patient found to have hemoglobin of 6 2, ,000  He was admitted to [facility] for blood transfusion and plan to transfer to [address] [place] [address]  On 3/20/17 WBC count 195,000, hemoglobin 4 9, platelet count 65  Direct coomb's was positive with undetectable haptoglobin   He was given 2 units of PRBCs, Solu-Medrol 1 g ×3 days and IVIG 1 g/kg daily ×3 days  His hemoglobin continued to drop  Bone marrow biopsy on 3/21 showed marrow cellularity of greater than 95% almost replaced by small lymphocytes, lambda light chain restricted, CD20 positive, CD19 positive, CD23 positive partially expressing CD11c and CD25 and CD5 positive and negative for CD 79 and CD38  He was treated with single dose of chlorambucil to control his CLL   3/22 second dose of chlorambucil, also Rituxan 375 mg/M ² autoimmune hemolytic anemia  WBC continued to rise, 266,000  Patient initiated with Venetoclax 20 mg daily  Tumor lysis monitored every 8 hours; given aggressive hydration and Lasix and remained euvolemic  Later venetoclax was changed to ibrutinib because of disease progression  Dec 2017- Imbruvica decreased to 140 mg PO daily due to thrombocytopenia   Later Eudelia Chad was added     4/23 - 4/27/18 patient was admitted due to listeria bacteremia  He was discharged on IV ampicillin  Echo negative for vegetations   Gasper Kern and Eudelia Chad was on hold at that time          10/18/18 the patient EGD   This showed distal esophageal stricture   This was dilated to 18 mm using a balloon dilator   He could not dilate any further due to bleeding secondary to thrombocytopenia          Current Outpatient Medications:     acyclovir (ZOVIRAX) 400 MG tablet, Take 1 tablet (400 mg total) by mouth 2 (two) times a day, Disp: 60 tablet, Rfl: 5    aspirin 81 MG tablet, Take 81 mg by mouth every other day Every other day , Disp: , Rfl:     citalopram (CeleXA) 40 mg tablet, Take 1 tablet (40 mg total) by mouth daily, Disp: 90 tablet, Rfl: 3    fenofibrate (TRICOR) 145 mg tablet, Take 1 tablet (145 mg total) by mouth daily, Disp: 90 tablet, Rfl: 3    folic acid (FOLVITE) 1 mg tablet, Take 1 mg by mouth daily, Disp: , Rfl:     furosemide (LASIX) 40 mg tablet, furosemide 40 mg tablet, Disp: , Rfl:     gabapentin (NEURONTIN) 600 MG tablet, TAKE 1 TABLET (600 MG TOTAL) BY MOUTH DAILY, Disp: 30 tablet, Rfl: 2    glucose monitoring kit (FREESTYLE) monitoring kit, 1 each by Does not apply route as needed ( ) E 11 9, Disp: 1 each, Rfl: 0    Ibrutinib 140 MG TABS, Take 140 mg by mouth daily for 148 days, Disp: 30 tablet, Rfl: 5    Lancets (FREESTYLE) lancets, Use as instructed--test 2 times a day  E 11 9, Disp: 100 each, Rfl: 0    losartan (COZAAR) 100 MG tablet, Take 1 tablet (100 mg total) by mouth daily, Disp: 30 tablet, Rfl: 6    multivitamin (THERAGRAN) TABS, Take 1 tablet by mouth daily, Disp: , Rfl:     obinutuzumab (GAZYVA) 1000 mg/40 mL SOLN, Infuse into a venous catheter, Disp: , Rfl:     oxyCODONE (ROXICODONE) 10 MG TABS, Take 1 tablet (10 mg total) by mouth every 6 (six) hours as needed for moderate pain For ongoing pain controlMax Daily Amount: 40 mg, Disp: 90 tablet, Rfl: 0    pantoprazole (PROTONIX) 40 mg tablet, TAKE 1 TABLET (40 MG TOTAL) BY MOUTH 2 (TWO) TIMES A DAY FOR 30 DAYS, Disp: 60 tablet, Rfl: 6    pantoprazole (PROTONIX) 40 mg tablet, Take 1 tablet (40 mg total) by mouth daily, Disp: 90 tablet, Rfl: 3    Potassium (POTASSIMIN PO), Take 20 mEq by mouth daily, Disp: , Rfl:     predniSONE 5 mg tablet, TAKE 1 TO 2 TABLETS EVERY DAY AS DIRECTED WITH FOOD, Disp: 30 tablet, Rfl: 4    sodium chloride, PF, 0 9 %, 10 mL by Intracatheter route see administration instructions 10mL into port before and after ampicillin doses, Disp: , Rfl: 0    VENTOLIN  (90 Base) MCG/ACT inhaler, Inhale 2 puffs 4 (four) times a day as needed for wheezing, Disp: 2 Inhaler, Rfl: 3    insulin lispro (HUMALOG KWIKPEN) 100 units/mL injection pen, Use 5-10 units before meals as needed for steroid induced hyperglycemia (Patient not taking: Reported on 10/7/2019), Disp: 5 pen, Rfl: 1    Insulin Syringe-Needle U-100 30G X 1/2" 0 3 ML MISC, by Does not apply route 2 (two) times a day (Patient not taking: Reported on 10/7/2019), Disp: 100 each, Rfl: 6    Allergies   Allergen Reactions    Heparin Other (See Comments)     Other reaction(s): Blood Disorders  clot    Macrolides And Ketolides Other (See Comments)     Interacts with other meds he is taking          CLL (chronic lymphocytic leukemia) ([facility] )    8/22/2017 -  Chemotherapy     [No matching medication found in this treatment plan]      4/24/2018 Initial Diagnosis     CLL (chronic lymphocytic leukemia) ([facility] )         ROS:  11/27/19 Reviewed 13 systems:  Presently no other neurological, cardiac, pulmonary , GI and  symptoms other than mentioned above  No other symptoms like  fever, chills, bleeding except for vascular purpura, bone pains, skin rash, weight loss,    numbness,  claudication and gait problem  No frequent infections  Not unusually sensitive to heat or cold  No swelling of the ankles  No swollen glands  Patient is anxious  Other symptoms are in HPI        /70 (BP Location: Left arm, Patient Position: Sitting, Cuff Size: Adult)   Pulse 74   Temp 98 5 °F (36 9 °C)   Resp 16   Ht 6' 1 5" (1 867 m)   Wt 108 kg (239 lb)   SpO2 97%   BMI 31 10 kg/m²     Physical Exam:  Alert, oriented, not in distress, no icterus, no oral thrush, no palpable neck mass, clear lung fields, regular heart rate, abdomen  soft and non tender, no palpable abdominal mass, no ascites, no edema of ankles, no calf tenderness, no focal neurological deficit, no skin rash except for vascular purpura forearms, no palpable lymphadenopathy, good arterial pulses, no clubbing  Patient is anxious  Performance status 1  IMAGING:  IMPRESSION:     No evidence of metastatic disease within the chest abdomen and pelvis  Interval resolution of previously noted splenomegaly      Hepatic steatosis             Workstation performed: [id number]      Imaging     CT chest abdomen pelvis w contrast (Order: [id number]) - 6/27/2019       LABS:  Results for orders placed or performed during the hospital encounter of 11/05/19   Beta 2 microglobulin, serum   Result Value Ref Range    Beta-2 Microglobulin 2 3 0 6 - 2 4 mg/L   CBC and differential   Result Value Ref Range    WBC 3 47 (L) 4 31 - 10 16 Thousand/uL    RBC 4 63 3 88 - 5 62 Million/uL    Hemoglobin 13 9 12 0 - 17 0 g/dL    Hematocrit 41 9 36 5 - 49 3 %    MCV 91 82 - 98 fL    MCH 30 0 26 8 - 34 3 pg    MCHC 33 2 31 4 - 37 4 g/dL    RDW 14 0 11 6 - 15 1 %    MPV 11 0 8 9 - 12 7 fL    Platelets 743 (L) 073 - 390 Thousands/uL   Comprehensive metabolic panel   Result Value Ref Range    Sodium 138 136 - 145 mmol/L    Potassium 4 2 3 5 - 5 3 mmol/L    Chloride 105 98 - 108 mmol/L    CO2 27 21 - 32 mmol/L    ANION GAP 6 4 - 13 mmol/L    BUN 20 5 - 25 mg/dL    Creatinine 0 90 0 60 - 1 30 mg/dL    Glucose 157 (H) 65 - 140 mg/dL    Calcium 9 5 8 3 - 10 1 mg/dL    AST 34 5 - 45 U/L    ALT 35 12 - 78 U/L    Alkaline Phosphatase 59 46 - 116 U/L    Total Protein 6 1 (L) 6 4 - 8 2 g/dL    Albumin 3 6 3 5 - 5 7 g/dL    Total Bilirubin 0 50 0 20 - 1 00 mg/dL    eGFR 89 ml/min/1 73sq m   IgG   Result Value Ref Range     0 (L) 700 0-1,600 0 mg/dL   LD,Blood   Result Value Ref Range     81 - 234 U/L   Reticulocyte Count with Retic HGB   Result Value Ref Range    Immature Retic Fract 10 0 0 0 - 14 0 %    Retic Ct Pct 2 02 (H) 0 37 - 1 87 %    Retic Ct Abs 92,700 14,356-105,094    RETIC HGB 34 6 30 0 - 38 3 pg   Uric acid   Result Value Ref Range    Uric Acid 3 9 (L) 4 2 - 8 0 mg/dL   Manual Differential(PHLEBS Do Not Order)   Result Value Ref Range    Segmented % 69 43 - 75 %    Bands % 4 0 - 8 %    Lymphocytes % 15 14 - 44 %    Monocytes % 9 4 - 12 %    Eosinophils, % 3 0 - 6 %    Basophils % 0 0 - 1 %    Absolute Neutrophils 2 53 1 85 - 7 62 Thousand/uL    Lymphocytes Absolute 0 52 (L) 0 60 - 4 47 Thousand/uL    Monocytes Absolute 0 31 0 00 - 1 22 Thousand/uL    Eosinophils Absolute 0 10 0 00 - 0 40 Thousand/uL    Basophils Absolute 0 00 0 00 - 0 10 Thousand/uL    Total Counted 100     RBC Morphology Normal     Platelet Estimate Borderline (A) Adequate     Labs, Imaging, & Other studies:   All pertinent labs and imaging studies were personally reviewed    Lab Results   Component Value Date     12/29/2015    K 4 2 11/05/2019     11/05/2019    CO2 27 11/05/2019    ANIONGAP 8 12/29/2015    BUN 20 11/05/2019    CREATININE 0 90 11/05/2019    GLUCOSE 109 12/29/2015    GLUF 113 (H) 06/18/2019    CALCIUM 9 5 11/05/2019    AST 34 11/05/2019    ALT 35 11/05/2019    ALKPHOS 59 11/05/2019    PROT 5 9 (L) 12/29/2015    BILITOT 0 7 12/29/2015    EGFR 89 11/05/2019     Lab Results   Component Value Date    WBC 3 47 (L) 11/05/2019    HGB 13 9 11/05/2019    HCT 41 9 11/05/2019    MCV 91 11/05/2019     (L) 11/05/2019       Reviewed and discussed with patient  Assessment and plan:   Patient is on Gazyva 1000 mg once a month, ibrutinib 140 mg daily, prednisone 5 mg daily, folic acid 1 mg daily, and IVIG therapy for   autoimmune hemolytic anemia , thrombocytopenia and  hypogammaglobinemia  Hematological status has improved especially platelet counts   He has increased tiredness   He has gained weight  No more flare up of arthritis  Less vascular purpura on the arms  Dorotheabairon Soni is taking vitamin-D and calcium and tries to stay active to prevent worsening of 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  He will have DEXA scan  Leojerome Scherer had been to several lines of treatments for CLL with autoimmune hemolytic anemia and thrombocytopenia  He has chronic lung disease   He has coronary artery disease and has 1 stent and he takes 81 mg aspirin daily with food  He has less exertional dyspnea and minimal nonproductive cough     He has occasionally sharp pain in right subcostal area that lasts for few seconds   History of heparin induced thrombocytopenia  CLL was diagnosed several years ago  He had been through Fludara based chemotherapy and pentostatin based chemotherapy  He had increased hemolysis when he was on chemotherapy   His most recent chemotherapy treatment was Cytoxan, Oncovin, prednisone and Rituxan and last treatment was in December 2012    In 2013 he was started on Rituxan, prednisone and folic acid because he started to [facility] again  IVIG was tried unsuccessfully so far as hemolysis is concerned  Rituxan has controlled the hemolysis    Information on the treatments from March 2017 onwards  On 3/20/17 patient found to have hemoglobin of 6 2, ,000  He was admitted to [facility] for blood transfusion and plan to transfer to [address] [place] [address]  On 3/20/17 WBC count 195,000, hemoglobin 4 9, platelet count 65  Direct coomb's was positive with undetectable haptoglobin  He was given 2 units of PRBCs, Solu-Medrol 1 g ×3 days and IVIG 1 g/kg daily ×3 days  His hemoglobin continued to drop  Bone marrow biopsy on 3/21 showed marrow cellularity of greater than 95% almost replaced by small lymphocytes, lambda light chain restricted, CD20 positive, CD19 positive, CD23 positive partially expressing CD11c and CD25 and CD5 positive and negative for CD 79 and CD38  He was treated with single dose of chlorambucil to control his CLL   3/22 second dose of chlorambucil, also Rituxan 375 mg/M ² autoimmune hemolytic anemia  WBC continued to rise, 266,000  Patient initiated with Venetoclax 20 mg daily  Tumor lysis monitored every 8 hours; given aggressive hydration and Lasix and remained euvolemic  Later venetoclax was changed to ibrutinib because of disease progression  Dec 2017- Imbruvica decreased to 140 mg PO daily due to thrombocytopenia   Later Elzbieta Boris was added     4/23 - 4/27/18 patient was admitted due to listeria bacteremia  He was discharged on IV ampicillin  Echo negative for vegetations   Chidi Rueda and Elzbieta Boris was on hold at that time          10/18/18 the patient EGD   This showed distal esophageal stricture   This was dilated to 18 mm using a balloon dilator   He could not dilate any further due to bleeding secondary to thrombocytopenia               Physical examination and test results are as recorded and discussed   Patient is being continued on present doses of   Ibrutinib , gazyva, IVIG therapy, prednisone with slow tapering and folic acid    Patient's condition, counts and chemistry are being monitored closely     Condition discussed and explained   Questions answered   Also discussed the importance of avoiding infections  and prompt treatment of infections   Goal is prolongation of survival   Patient is capable of self-care   1  CLL (chronic lymphocytic leukemia) ([facility] )      2  Hypogammaglobulinemia, acquired ([facility] )      3  Thrombocytopenia ([facility] )      4  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[facility])      5  Other autoimmune hemolytic anemias ([facility])      6  Age-related osteoporosis without current pathological fracture    - DXA bone density spine hip and pelvis; Future          Patient voiced understanding and agreement in the discussion  Counseling / Coordination of Care   Greater than 50% of total time was spent with the patient and / or family counseling and / or coordination of care

## 2019-11-27 NOTE — LETTER
November 27, 2019     MD [address]  [address]    Patient: Oscar Linton   YOB: 1955   Date of Visit: 11/27/2019       Dear Dr Ketty Davis: Thank you for referring Aries Fitch to me for evaluation  Below are my notes for this consultation  If you have questions, please do not hesitate to call me  I look forward to following your patient along with you  Sincerely,        Alana Lugo MD        CC: No Recipients  Alana Lugo MD  11/27/2019 10:09 AM  Sign at close encounter    HPI:  Patient is on Gazyva 1000 mg once a month, ibrutinib 140 mg daily, prednisone 5 mg daily, folic acid 1 mg daily, and IVIG therapy for   autoimmune hemolytic anemia , thrombocytopenia and  hypogammaglobinemia  Hematological status has improved especially platelet counts   He has increased tiredness   He has gained weight  No more flare up of arthritis  Less vascular purpura on the arms  Lexie Aldridge is taking vitamin-D and calcium and tries to stay active to prevent worsening of 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  He will have DEXA scan  Rella Victor Hugo had been to several lines of treatments for CLL with autoimmune hemolytic anemia and thrombocytopenia  He has chronic lung disease   He has coronary artery disease and has 1 stent and he takes 81 mg aspirin daily with food  He has less exertional dyspnea and minimal nonproductive cough     He has occasionally sharp pain in right subcostal area that lasts for few seconds   History of heparin induced thrombocytopenia  CLL was diagnosed several years ago  He had been through Fludara based chemotherapy and pentostatin based chemotherapy  He had increased hemolysis when he was on chemotherapy  His most recent chemotherapy treatment was Cytoxan, Oncovin, prednisone and Rituxan and last treatment was in December 2012    In 2013 he was started on Rituxan, prednisone and folic acid because he started to [facility] again   IVIG was tried unsuccessfully so far as hemolysis is concerned  Rituxan has controlled the hemolysis    Information on the treatments from March 2017 onwards  On 3/20/17 patient found to have hemoglobin of 6 2, ,000  He was admitted to [facility] for blood transfusion and plan to transfer to [address] [place] [address]  On 3/20/17 WBC count 195,000, hemoglobin 4 9, platelet count 65  Direct coomb's was positive with undetectable haptoglobin  He was given 2 units of PRBCs, Solu-Medrol 1 g ×3 days and IVIG 1 g/kg daily ×3 days  His hemoglobin continued to drop  Bone marrow biopsy on 3/21 showed marrow cellularity of greater than 95% almost replaced by small lymphocytes, lambda light chain restricted, CD20 positive, CD19 positive, CD23 positive partially expressing CD11c and CD25 and CD5 positive and negative for CD 79 and CD38  He was treated with single dose of chlorambucil to control his CLL   3/22 second dose of chlorambucil, also Rituxan 375 mg/M ² autoimmune hemolytic anemia  WBC continued to rise, 266,000  Patient initiated with Venetoclax 20 mg daily  Tumor lysis monitored every 8 hours; given aggressive hydration and Lasix and remained euvolemic  Later venetoclax was changed to ibrutinib because of disease progression  Dec 2017- Imbruvica decreased to 140 mg PO daily due to thrombocytopenia   Later Lupe Montez was added     4/23 - 4/27/18 patient was admitted due to listeria bacteremia  He was discharged on IV ampicillin  Echo negative for vegetations   Christine Pineda and Lupe Montez was on hold at that time          10/18/18 the patient EGD   This showed distal esophageal stricture   This was dilated to 18 mm using a balloon dilator   He could not dilate any further due to bleeding secondary to thrombocytopenia          Current Outpatient Medications:     acyclovir (ZOVIRAX) 400 MG tablet, Take 1 tablet (400 mg total) by mouth 2 (two) times a day, Disp: 60 tablet, Rfl: 5    aspirin 81 MG tablet, Take 81 mg by mouth every other day Every other day , Disp: , Rfl:     citalopram (CeleXA) 40 mg tablet, Take 1 tablet (40 mg total) by mouth daily, Disp: 90 tablet, Rfl: 3    fenofibrate (TRICOR) 145 mg tablet, Take 1 tablet (145 mg total) by mouth daily, Disp: 90 tablet, Rfl: 3    folic acid (FOLVITE) 1 mg tablet, Take 1 mg by mouth daily, Disp: , Rfl:     furosemide (LASIX) 40 mg tablet, furosemide 40 mg tablet, Disp: , Rfl:     gabapentin (NEURONTIN) 600 MG tablet, TAKE 1 TABLET (600 MG TOTAL) BY MOUTH DAILY, Disp: 30 tablet, Rfl: 2    glucose monitoring kit (FREESTYLE) monitoring kit, 1 each by Does not apply route as needed ( ) E 11 9, Disp: 1 each, Rfl: 0    Ibrutinib 140 MG TABS, Take 140 mg by mouth daily for 148 days, Disp: 30 tablet, Rfl: 5    Lancets (FREESTYLE) lancets, Use as instructed--test 2 times a day  E 11 9, Disp: 100 each, Rfl: 0    losartan (COZAAR) 100 MG tablet, Take 1 tablet (100 mg total) by mouth daily, Disp: 30 tablet, Rfl: 6    multivitamin (THERAGRAN) TABS, Take 1 tablet by mouth daily, Disp: , Rfl:     obinutuzumab (GAZYVA) 1000 mg/40 mL SOLN, Infuse into a venous catheter, Disp: , Rfl:     oxyCODONE (ROXICODONE) 10 MG TABS, Take 1 tablet (10 mg total) by mouth every 6 (six) hours as needed for moderate pain For ongoing pain controlMax Daily Amount: 40 mg, Disp: 90 tablet, Rfl: 0    pantoprazole (PROTONIX) 40 mg tablet, TAKE 1 TABLET (40 MG TOTAL) BY MOUTH 2 (TWO) TIMES A DAY FOR 30 DAYS, Disp: 60 tablet, Rfl: 6    pantoprazole (PROTONIX) 40 mg tablet, Take 1 tablet (40 mg total) by mouth daily, Disp: 90 tablet, Rfl: 3    Potassium (POTASSIMIN PO), Take 20 mEq by mouth daily, Disp: , Rfl:     predniSONE 5 mg tablet, TAKE 1 TO 2 TABLETS EVERY DAY AS DIRECTED WITH FOOD, Disp: 30 tablet, Rfl: 4    sodium chloride, PF, 0 9 %, 10 mL by Intracatheter route see administration instructions 10mL into port before and after ampicillin doses, Disp: , Rfl: 0    VENTOLIN  (90 Base) MCG/ACT inhaler, Inhale 2 puffs 4 (four) times a day as needed for wheezing, Disp: 2 Inhaler, Rfl: 3    insulin lispro (HUMALOG KWIKPEN) 100 units/mL injection pen, Use 5-10 units before meals as needed for steroid induced hyperglycemia (Patient not taking: Reported on 10/7/2019), Disp: 5 pen, Rfl: 1    Insulin Syringe-Needle U-100 30G X 1/2" 0 3 ML MISC, by Does not apply route 2 (two) times a day (Patient not taking: Reported on 10/7/2019), Disp: 100 each, Rfl: 6    Allergies   Allergen Reactions    Heparin Other (See Comments)     Other reaction(s): Blood Disorders  clot    Macrolides And Ketolides Other (See Comments)     Interacts with other meds he is taking          CLL (chronic lymphocytic leukemia) ([facility] )    8/22/2017 -  Chemotherapy     [No matching medication found in this treatment plan]      4/24/2018 Initial Diagnosis     CLL (chronic lymphocytic leukemia) ([place] [address] )         ROS:  11/27/19 Reviewed 13 systems:  Presently no other neurological, cardiac, pulmonary , GI and  symptoms other than mentioned above  No other symptoms like  fever, chills, bleeding except for vascular purpura, bone pains, skin rash, weight loss,    numbness,  claudication and gait problem  No frequent infections  Not unusually sensitive to heat or cold  No swelling of the ankles  No swollen glands  Patient is anxious   Other symptoms are in HPI        /70 (BP Location: Left arm, Patient Position: Sitting, Cuff Size: Adult)   Pulse 74   Temp 98 5 °F (36 9 °C)   Resp 16   Ht 6' 1 5" (1 867 m)   Wt 108 kg (239 lb)   SpO2 97%   BMI 31 10 kg/m²      Physical Exam:  Alert, oriented, not in distress, no icterus, no oral thrush, no palpable neck mass, clear lung fields, regular heart rate, abdomen  soft and non tender, no palpable abdominal mass, no ascites, no edema of ankles, no calf tenderness, no focal neurological deficit, no skin rash except for vascular purpura forearms, no palpable lymphadenopathy, good arterial pulses, no clubbing  Patient is anxious  Performance status 1  IMAGING:  IMPRESSION:     No evidence of metastatic disease within the chest abdomen and pelvis  Interval resolution of previously noted splenomegaly      Hepatic steatosis             Workstation performed: [id number]      Imaging     CT chest abdomen pelvis w contrast (Order: [id number]) - 6/27/2019       LABS:  Results for orders placed or performed during the hospital encounter of 11/05/19   Beta 2 microglobulin, serum   Result Value Ref Range    Beta-2 Microglobulin 2 3 0 6 - 2 4 mg/L   CBC and differential   Result Value Ref Range    WBC 3 47 (L) 4 31 - 10 16 Thousand/uL    RBC 4 63 3 88 - 5 62 Million/uL    Hemoglobin 13 9 12 0 - 17 0 g/dL    Hematocrit 41 9 36 5 - 49 3 %    MCV 91 82 - 98 fL    MCH 30 0 26 8 - 34 3 pg    MCHC 33 2 31 4 - 37 4 g/dL    RDW 14 0 11 6 - 15 1 %    MPV 11 0 8 9 - 12 7 fL    Platelets 879 (L) 358 - 390 Thousands/uL   Comprehensive metabolic panel   Result Value Ref Range    Sodium 138 136 - 145 mmol/L    Potassium 4 2 3 5 - 5 3 mmol/L    Chloride 105 98 - 108 mmol/L    CO2 27 21 - 32 mmol/L    ANION GAP 6 4 - 13 mmol/L    BUN 20 5 - 25 mg/dL    Creatinine 0 90 0 60 - 1 30 mg/dL    Glucose 157 (H) 65 - 140 mg/dL    Calcium 9 5 8 3 - 10 1 mg/dL    AST 34 5 - 45 U/L    ALT 35 12 - 78 U/L    Alkaline Phosphatase 59 46 - 116 U/L    Total Protein 6 1 (L) 6 4 - 8 2 g/dL    Albumin 3 6 3 5 - 5 7 g/dL    Total Bilirubin 0 50 0 20 - 1 00 mg/dL    eGFR 89 ml/min/1 73sq m   IgG   Result Value Ref Range     0 (L) 700 0-1,600 0 mg/dL   LD,Blood   Result Value Ref Range     81 - 234 U/L   Reticulocyte Count with Retic HGB   Result Value Ref Range    Immature Retic Fract 10 0 0 0 - 14 0 %    Retic Ct Pct 2 02 (H) 0 37 - 1 87 %    Retic Ct Abs 92,700 14,356-105,094    RETIC HGB 34 6 30 0 - 38 3 pg   Uric acid   Result Value Ref Range    Uric Acid 3 9 (L) 4 2 - 8 0 mg/dL   Manual Differential(PHLEBS Do Not Order)   Result Value Ref Range    Segmented % 69 43 - 75 %    Bands % 4 0 - 8 %    Lymphocytes % 15 14 - 44 %    Monocytes % 9 4 - 12 %    Eosinophils, % 3 0 - 6 %    Basophils % 0 0 - 1 %    Absolute Neutrophils 2 53 1 85 - 7 62 Thousand/uL    Lymphocytes Absolute 0 52 (L) 0 60 - 4 47 Thousand/uL    Monocytes Absolute 0 31 0 00 - 1 22 Thousand/uL    Eosinophils Absolute 0 10 0 00 - 0 40 Thousand/uL    Basophils Absolute 0 00 0 00 - 0 10 Thousand/uL    Total Counted 100     RBC Morphology Normal     Platelet Estimate Borderline (A) Adequate     Labs, Imaging, & Other studies:   All pertinent labs and imaging studies were personally reviewed    Lab Results   Component Value Date     12/29/2015    K 4 2 11/05/2019     11/05/2019    CO2 27 11/05/2019    ANIONGAP 8 12/29/2015    BUN 20 11/05/2019    CREATININE 0 90 11/05/2019    GLUCOSE 109 12/29/2015    GLUF 113 (H) 06/18/2019    CALCIUM 9 5 11/05/2019    AST 34 11/05/2019    ALT 35 11/05/2019    ALKPHOS 59 11/05/2019    PROT 5 9 (L) 12/29/2015    BILITOT 0 7 12/29/2015    EGFR 89 11/05/2019     Lab Results   Component Value Date    WBC 3 47 (L) 11/05/2019    HGB 13 9 11/05/2019    HCT 41 9 11/05/2019    MCV 91 11/05/2019     (L) 11/05/2019       Reviewed and discussed with patient  Assessment and plan:   Patient is on Gazyva 1000 mg once a month, ibrutinib 140 mg daily, prednisone 5 mg daily, folic acid 1 mg daily, and IVIG therapy for   autoimmune hemolytic anemia , thrombocytopenia and  hypogammaglobinemia  Hematological status has improved especially platelet counts   He has increased tiredness   He has gained weight  No more flare up of arthritis  Less vascular purpura on the arms  Suzanne Manzo is taking vitamin-D and calcium and tries to stay active to prevent worsening of 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    He will have DEXA scan  Janine Barnes had been to several lines of treatments for CLL with autoimmune hemolytic anemia and thrombocytopenia  He has chronic lung disease   He has coronary artery disease and has 1 stent and he takes 81 mg aspirin daily with food  He has less exertional dyspnea and minimal nonproductive cough     He has occasionally sharp pain in right subcostal area that lasts for few seconds   History of heparin induced thrombocytopenia  CLL was diagnosed several years ago  He had been through Fludara based chemotherapy and pentostatin based chemotherapy  He had increased hemolysis when he was on chemotherapy  His most recent chemotherapy treatment was Cytoxan, Oncovin, prednisone and Rituxan and last treatment was in December 2012    In 2013 he was started on Rituxan, prednisone and folic acid because he started to [facility] again  IVIG was tried unsuccessfully so far as hemolysis is concerned  Rituxan has controlled the hemolysis    Information on the treatments from March 2017 onwards  On 3/20/17 patient found to have hemoglobin of 6 2, ,000  He was admitted to [facility] for blood transfusion and plan to transfer to [address] [place] [address]  On 3/20/17 WBC count 195,000, hemoglobin 4 9, platelet count 65  Direct coomb's was positive with undetectable haptoglobin  He was given 2 units of PRBCs, Solu-Medrol 1 g ×3 days and IVIG 1 g/kg daily ×3 days  His hemoglobin continued to drop  Bone marrow biopsy on 3/21 showed marrow cellularity of greater than 95% almost replaced by small lymphocytes, lambda light chain restricted, CD20 positive, CD19 positive, CD23 positive partially expressing CD11c and CD25 and CD5 positive and negative for CD 79 and CD38  He was treated with single dose of chlorambucil to control his CLL   3/22 second dose of chlorambucil, also Rituxan 375 mg/M ² autoimmune hemolytic anemia  WBC continued to rise, 266,000  Patient initiated with Venetoclax 20 mg daily  Tumor lysis monitored every 8 hours; given aggressive hydration and Lasix and remained euvolemic  Later venetoclax was changed to ibrutinib because of disease progression  Dec 2017- Imbruvica decreased to 140 mg PO daily due to thrombocytopenia   Later Shandra Sing was added     4/23 - 4/27/18 patient was admitted due to listeria bacteremia  He was discharged on IV ampicillin  Echo negative for vegetations  Danny Cones and Shandra Sing was on hold at that time          10/18/18 the patient EGD   This showed distal esophageal stricture   This was dilated to 18 mm using a balloon dilator   He could not dilate any further due to bleeding secondary to thrombocytopenia               Physical examination and test results are as recorded and discussed   Patient is being continued on present doses of   Ibrutinib , gazyva, IVIG therapy, prednisone with slow tapering and folic acid    Patient's condition, counts and chemistry are being monitored closely     Condition discussed and explained   Questions answered   Also discussed the importance of avoiding infections  and prompt treatment of infections   Goal is prolongation of survival   Patient is capable of self-care   1  CLL (chronic lymphocytic leukemia) ([facility] )      2  Hypogammaglobulinemia, acquired ([facility] )      3  Thrombocytopenia ([facility] )      4  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[facility])      5  Other autoimmune hemolytic anemias ([facility])      6  Age-related osteoporosis without current pathological fracture    - DXA bone density spine hip and pelvis; Future          Patient voiced understanding and agreement in the discussion  Counseling / Coordination of Care   Greater than 50% of total time was spent with the patient and / or family counseling and / or coordination of care

## 2019-12-03 ENCOUNTER — HOSPITAL ENCOUNTER (OUTPATIENT)
Dept: INFUSION CENTER | Facility: CLINIC | Age: 64
Discharge: HOME | End: 2019-12-03
Payer: MEDICARE

## 2019-12-03 VITALS — TEMPERATURE: 98.3 F | SYSTOLIC BLOOD PRESSURE: 150 MMHG | DIASTOLIC BLOOD PRESSURE: 85 MMHG | RESPIRATION RATE: 16 BRPM

## 2019-12-03 DIAGNOSIS — C91.10 CLL (CHRONIC LYMPHOCYTIC LEUKEMIA) (HCC): Primary | ICD-10-CM

## 2019-12-03 DIAGNOSIS — D80.1 HYPOGAMMAGLOBULINEMIA, ACQUIRED (HCC): ICD-10-CM

## 2019-12-03 LAB
ALBUMIN SERPL BCP-MCNC: 3.7 G/DL (ref 3.5–5.7)
ALP SERPL-CCNC: 69 U/L (ref 46–116)
ALT SERPL W P-5'-P-CCNC: 38 U/L (ref 12–78)
ANION GAP SERPL CALCULATED.3IONS-SCNC: 4 MMOL/L (ref 4–13)
AST SERPL W P-5'-P-CCNC: 35 U/L (ref 5–45)
BASOPHILS # BLD MANUAL: 0.05 THOUSAND/UL (ref 0–0.1)
BASOPHILS NFR MAR MANUAL: 1 % (ref 0–1)
BILIRUB SERPL-MCNC: 0.6 MG/DL (ref 0.2–1)
BUN SERPL-MCNC: 21 MG/DL (ref 5–25)
CALCIUM SERPL-MCNC: 9.6 MG/DL (ref 8.3–10.1)
CHLORIDE SERPL-SCNC: 107 MMOL/L (ref 98–108)
CO2 SERPL-SCNC: 27 MMOL/L (ref 21–32)
CREAT SERPL-MCNC: 1.3 MG/DL (ref 0.6–1.3)
EOSINOPHIL # BLD MANUAL: 0.3 THOUSAND/UL (ref 0–0.4)
EOSINOPHIL NFR BLD MANUAL: 6 % (ref 0–6)
ERYTHROCYTE [DISTWIDTH] IN BLOOD BY AUTOMATED COUNT: 13.9 % (ref 11.6–15.1)
GFR SERPL CREATININE-BSD FRML MDRD: 57 ML/MIN/1.73SQ M
GLUCOSE SERPL-MCNC: 172 MG/DL (ref 65–140)
HCT VFR BLD AUTO: 43.4 % (ref 36.5–49.3)
HGB BLD-MCNC: 14.1 G/DL (ref 12–17)
HGB RETIC QN AUTO: 34.2 PG (ref 30–38.3)
IGG SERPL-MCNC: 542 MG/DL (ref 700–1600)
IMM RETICS NFR: 10.4 % (ref 0–14)
LDH SERPL-CCNC: 221 U/L (ref 81–234)
LYMPHOCYTES # BLD AUTO: 0.76 THOUSAND/UL (ref 0.6–4.47)
LYMPHOCYTES # BLD AUTO: 15 % (ref 14–44)
MCH RBC QN AUTO: 29.6 PG (ref 26.8–34.3)
MCHC RBC AUTO-ENTMCNC: 32.5 G/DL (ref 31.4–37.4)
MCV RBC AUTO: 91 FL (ref 82–98)
MONOCYTES # BLD AUTO: 0.46 THOUSAND/UL (ref 0–1.22)
MONOCYTES NFR BLD: 9 % (ref 4–12)
NEUTROPHILS # BLD MANUAL: 3.45 THOUSAND/UL (ref 1.85–7.62)
NEUTS BAND NFR BLD MANUAL: 2 % (ref 0–8)
NEUTS SEG NFR BLD AUTO: 66 % (ref 43–75)
PLATELET # BLD AUTO: 140 THOUSANDS/UL (ref 149–390)
PLATELET BLD QL SMEAR: ABNORMAL
PMV BLD AUTO: 11.3 FL (ref 8.9–12.7)
POTASSIUM SERPL-SCNC: 4.2 MMOL/L (ref 3.5–5.3)
PROT SERPL-MCNC: 6.1 G/DL (ref 6.4–8.2)
RBC # BLD AUTO: 4.77 MILLION/UL (ref 3.88–5.62)
RBC MORPH BLD: NORMAL
RETICS # AUTO: ABNORMAL 10*3/UL (ref 14356–105094)
RETICS # CALC: 2.28 % (ref 0.37–1.87)
SODIUM SERPL-SCNC: 138 MMOL/L (ref 136–145)
TOTAL CELLS COUNTED SPEC: 100
URATE SERPL-MCNC: 4.5 MG/DL (ref 4.2–8)
VARIANT LYMPHS # BLD AUTO: 1 %
WBC # BLD AUTO: 5.07 THOUSAND/UL (ref 4.31–10.16)

## 2019-12-03 PROCEDURE — 85007 BL SMEAR W/DIFF WBC COUNT: CPT | Performed by: INTERNAL MEDICINE

## 2019-12-03 PROCEDURE — 96413 CHEMO IV INFUSION 1 HR: CPT

## 2019-12-03 PROCEDURE — 82232 ASSAY OF BETA-2 PROTEIN: CPT | Performed by: INTERNAL MEDICINE

## 2019-12-03 PROCEDURE — 80053 COMPREHEN METABOLIC PANEL: CPT | Performed by: INTERNAL MEDICINE

## 2019-12-03 PROCEDURE — 96415 CHEMO IV INFUSION ADDL HR: CPT

## 2019-12-03 PROCEDURE — 96367 TX/PROPH/DG ADDL SEQ IV INF: CPT

## 2019-12-03 PROCEDURE — 85046 RETICYTE/HGB CONCENTRATE: CPT | Performed by: INTERNAL MEDICINE

## 2019-12-03 PROCEDURE — 83615 LACTATE (LD) (LDH) ENZYME: CPT | Performed by: INTERNAL MEDICINE

## 2019-12-03 PROCEDURE — 85027 COMPLETE CBC AUTOMATED: CPT | Performed by: INTERNAL MEDICINE

## 2019-12-03 PROCEDURE — 84550 ASSAY OF BLOOD/URIC ACID: CPT | Performed by: INTERNAL MEDICINE

## 2019-12-03 PROCEDURE — 82784 ASSAY IGA/IGD/IGG/IGM EACH: CPT | Performed by: INTERNAL MEDICINE

## 2019-12-03 RX ORDER — SODIUM CHLORIDE 9 MG/ML
20 INJECTION, SOLUTION INTRAVENOUS ONCE
Status: COMPLETED | OUTPATIENT
Start: 2019-12-03 | End: 2019-12-03

## 2019-12-03 RX ORDER — ACETAMINOPHEN 325 MG/1
650 TABLET ORAL ONCE
Status: COMPLETED | OUTPATIENT
Start: 2019-12-03 | End: 2019-12-03

## 2019-12-03 RX ADMIN — SODIUM CHLORIDE 20 ML/HR: 0.9 INJECTION, SOLUTION INTRAVENOUS at 08:25

## 2019-12-03 RX ADMIN — DEXAMETHASONE SODIUM PHOSPHATE 20 MG: 10 INJECTION, SOLUTION INTRAMUSCULAR; INTRAVENOUS at 08:28

## 2019-12-03 RX ADMIN — ACETAMINOPHEN 650 MG: 325 TABLET, FILM COATED ORAL at 08:29

## 2019-12-03 RX ADMIN — DIPHENHYDRAMINE HYDROCHLORIDE 25 MG: 50 INJECTION, SOLUTION INTRAMUSCULAR; INTRAVENOUS at 08:55

## 2019-12-03 RX ADMIN — OBINUTUZUMAB 1000 MG: 1000 INJECTION, SOLUTION, CONCENTRATE INTRAVENOUS at 09:27

## 2019-12-03 NOTE — PLAN OF CARE
Problem: Potential for Falls  Goal: Patient will remain free of falls  Description  INTERVENTIONS:  - Assess patient frequently for physical needs  -  Identify cognitive and physical deficits and behaviors that affect risk of falls    -  [place] fall precautions as indicated by assessment   - Educate patient/family on patient safety including physical limitations  - Instruct patient to call for assistance with activity based on assessment  - Modify environment to reduce risk of injury  - Consider OT/PT consult to assist with strengthening/mobility  Outcome: Progressing     Problem: PAIN - ADULT  Goal: Verbalizes/displays adequate comfort level or baseline comfort level  Description  Interventions:  - Encourage patient to monitor pain and request assistance  - Assess pain using appropriate pain scale  - Administer analgesics based on type and severity of pain and evaluate response  - Implement non-pharmacological measures as appropriate and evaluate response  - Consider cultural and social influences on pain and pain management  - Notify physician/advanced practitioner if interventions unsuccessful or patient reports new pain  Outcome: Progressing     Problem: DISCHARGE PLANNING  Goal: Discharge to home or other facility with appropriate resources  Description  INTERVENTIONS:  - Identify barriers to discharge w/patient and caregiver  - Arrange for needed discharge resources and transportation as appropriate  - Identify discharge learning needs (meds, wound care, etc )  - Arrange for interpretive services to assist at discharge as needed  - Refer to Case Management Department for coordinating discharge planning if the patient needs post-hospital services based on physician/advanced practitioner order or complex needs related to functional status, cognitive ability, or social support system  Outcome: Progressing     Problem: Knowledge Deficit  Goal: Patient/family/caregiver demonstrates understanding of disease process, treatment plan, medications, and discharge instructions  Description  Complete learning assessment and assess knowledge base  Interventions:  - Provide teaching at level of understanding  - Provide teaching via preferred learning methods  Outcome: Progressing     Problem: Knowledge Deficit  Goal: Patient/family/caregiver demonstrates understanding of disease process, treatment plan, medications, and discharge instructions  Description  Complete learning assessment and assess knowledge base  Interventions:  - Provide teaching at level of understanding  - Provide teaching via preferred learning methods  Outcome: Progressing     Problem: Knowledge Deficit  Goal: Patient/family/caregiver demonstrates understanding of disease process, treatment plan, medications, and discharge instructions  Description  Complete learning assessment and assess knowledge base    Interventions:  - Provide teaching at level of understanding  - Provide teaching via preferred learning methods  Outcome: Progressing

## 2019-12-04 ENCOUNTER — OFFICE VISIT (OUTPATIENT)
Dept: CARDIOLOGY CLINIC | Facility: CLINIC | Age: 64
End: 2019-12-04
Payer: MEDICARE

## 2019-12-04 VITALS
WEIGHT: 240.1 LBS | HEIGHT: 74 IN | HEART RATE: 65 BPM | OXYGEN SATURATION: 97 % | SYSTOLIC BLOOD PRESSURE: 150 MMHG | DIASTOLIC BLOOD PRESSURE: 80 MMHG | RESPIRATION RATE: 18 BRPM | BODY MASS INDEX: 30.81 KG/M2

## 2019-12-04 DIAGNOSIS — I10 ESSENTIAL HYPERTENSION: ICD-10-CM

## 2019-12-04 DIAGNOSIS — I25.10 CORONARY ARTERY DISEASE INVOLVING NATIVE CORONARY ARTERY OF NATIVE HEART WITHOUT ANGINA PECTORIS: Primary | ICD-10-CM

## 2019-12-04 PROCEDURE — 99214 OFFICE O/P EST MOD 30 MIN: CPT | Performed by: INTERNAL MEDICINE

## 2019-12-04 RX ORDER — POTASSIUM CHLORIDE 20 MEQ/1
20 TABLET, EXTENDED RELEASE ORAL DAILY
Qty: 90 TABLET | Refills: 3 | Status: SHIPPED | OUTPATIENT
Start: 2019-12-04 | End: 2020-08-11 | Stop reason: SDUPTHER

## 2019-12-04 RX ORDER — AMLODIPINE BESYLATE 5 MG/1
5 TABLET ORAL DAILY
Qty: 90 TABLET | Refills: 3 | Status: SHIPPED | OUTPATIENT
Start: 2019-12-04 | End: 2020-01-20 | Stop reason: SDUPTHER

## 2019-12-04 RX ORDER — FUROSEMIDE 40 MG/1
40 TABLET ORAL DAILY
Qty: 90 TABLET | Refills: 3 | Status: SHIPPED | OUTPATIENT
Start: 2019-12-04 | End: 2021-01-06

## 2019-12-04 NOTE — PATIENT INSTRUCTIONS
The patient is advised to resume Lasix at 40 mg daily p r n  As well as potassium chloride 20 mEq daily p r n  Along with the Lasix  The patient will be started on amlodipine 5 mg daily  The patient will have a BMP done in a few weeks

## 2019-12-04 NOTE — ASSESSMENT & PLAN NOTE
The patient has a known history of coronary artery disease, stable  He has no symptoms of angina or any signs of heart failure  The patient however feels somewhat bloated since he has been on prednisone  I will therefore restart furosemide at 40 mg daily to be taken on a p r n  Basis  I will also ask the patient to resume potassium at 20 mEq along with the furosemide

## 2019-12-04 NOTE — LETTER
December 4, 2019     Pedro Ariza MD  [address]  [place] [address]    Patient: Aurora Palma   YOB: 1955   Date of Visit: 12/4/2019       Dear Dr Margarito Pacheco: Thank you for referring Norris Oquendo to me for evaluation  Below are my notes for this consultation  If you have questions, please do not hesitate to call me  I look forward to following your patient along with you  Sincerely,        Loni Rodriguez MD        CC: No Recipients  Loni Rodriguez MD  12/4/2019  9:41 AM  Sign at close encounter  Assessment/Plan:    CAD (coronary artery disease)  The patient has a known history of coronary artery disease, stable  He has no symptoms of angina or any signs of heart failure  The patient however feels somewhat bloated since he has been on prednisone  I will therefore restart furosemide at 40 mg daily to be taken on a p r n  Basis  I will also ask the patient to resume potassium at 20 mEq along with the furosemide  Hypertension  Hypertension, less than optimally controlled  I will add amlodipine 5 mg daily to his regimen  Diagnoses and all orders for this visit:    Coronary artery disease involving native coronary artery of native heart without angina pectoris    Essential hypertension  -     furosemide (LASIX) 40 mg tablet; Take 1 tablet (40 mg total) by mouth daily  -     potassium chloride (K-DUR,KLOR-CON) 20 mEq tablet; Take 1 tablet (20 mEq total) by mouth daily  -     amLODIPine (NORVASC) 5 mg tablet; Take 1 tablet (5 mg total) by mouth daily  -     Basic metabolic panel; Future          Subjective:  Elevated blood pressure  Patient ID: Aurora Palma is a 71 y o  male  Patient presented to this office for the purpose of cardiac follow-up  He is known to have coronary artery disease and hypertension  He has been treated for CLL  He takes chemotherapy once a month and he has been on prednisone  He has noted that his blood pressure is somewhat elevated    He also feels somewhat bloated  This could be on the basis of the use of prednisone with somewhat retention  Patient denies any symptoms of chest pain and his breathing is adequately comfortable  He denies any symptoms of palpitation, dizziness or lightheadedness  The following portions of the patient's history were reviewed and updated as appropriate: allergies, current medications, past family history, past medical history, past social history, past surgical history and problem list     Review of Systems   Respiratory: Negative for apnea, cough, chest tightness, shortness of breath and wheezing  Cardiovascular: Negative for chest pain, palpitations and leg swelling  Gastrointestinal: Negative for abdominal pain  Neurological: Negative for dizziness and light-headedness  Hematological: Negative  Psychiatric/Behavioral: Negative  Objective:  Elevated blood pressure  /80 (BP Location: Right arm, Patient Position: Sitting)   Pulse 65   Resp 18   Ht 6' 1 5" (1 867 m)   Wt 109 kg (240 lb 1 6 oz)   SpO2 97%   BMI 31 25 kg/m²           Physical Exam   Constitutional: He is oriented to person, place, and time  He appears well-developed and well-nourished  No distress  HENT:   Head: Normocephalic  Eyes: Pupils are equal, round, and reactive to light  Neck: Normal range of motion  No JVD present  No thyromegaly present  Cardiovascular: Normal rate, regular rhythm, S1 normal and S2 normal  Exam reveals no gallop and no friction rub  Murmur heard  Crescendo systolic murmur is present with a grade of 2/6  Pulmonary/Chest: Effort normal and breath sounds normal  No respiratory distress  He has no wheezes  He has no rales  He exhibits no tenderness  Abdominal: Soft  Musculoskeletal: Normal range of motion  He exhibits no edema, tenderness or deformity  Neurological: He is alert and oriented to person, place, and time  Skin: Skin is warm and dry  He is not diaphoretic  Psychiatric: He has a normal mood and affect  Vitals reviewed

## 2019-12-04 NOTE — PROGRESS NOTES
Assessment/Plan:    CAD (coronary artery disease)  The patient has a known history of coronary artery disease, stable  He has no symptoms of angina or any signs of heart failure  The patient however feels somewhat bloated since he has been on prednisone  I will therefore restart furosemide at 40 mg daily to be taken on a p r n  Basis  I will also ask the patient to resume potassium at 20 mEq along with the furosemide  Hypertension  Hypertension, less than optimally controlled  I will add amlodipine 5 mg daily to his regimen  Diagnoses and all orders for this visit:    Coronary artery disease involving native coronary artery of native heart without angina pectoris    Essential hypertension  -     furosemide (LASIX) 40 mg tablet; Take 1 tablet (40 mg total) by mouth daily  -     potassium chloride (K-DUR,KLOR-CON) 20 mEq tablet; Take 1 tablet (20 mEq total) by mouth daily  -     amLODIPine (NORVASC) 5 mg tablet; Take 1 tablet (5 mg total) by mouth daily  -     Basic metabolic panel; Future          Subjective:  Elevated blood pressure  Patient ID: Shima Spann is a 71 y o  male  Patient presented to this office for the purpose of cardiac follow-up  He is known to have coronary artery disease and hypertension  He has been treated for CLL  He takes chemotherapy once a month and he has been on prednisone  He has noted that his blood pressure is somewhat elevated  He also feels somewhat bloated  This could be on the basis of the use of prednisone with somewhat retention  Patient denies any symptoms of chest pain and his breathing is adequately comfortable  He denies any symptoms of palpitation, dizziness or lightheadedness        The following portions of the patient's history were reviewed and updated as appropriate: allergies, current medications, past family history, past medical history, past social history, past surgical history and problem list     Review of Systems   Respiratory: Negative for apnea, cough, chest tightness, shortness of breath and wheezing  Cardiovascular: Negative for chest pain, palpitations and leg swelling  Gastrointestinal: Negative for abdominal pain  Neurological: Negative for dizziness and light-headedness  Hematological: Negative  Psychiatric/Behavioral: Negative  Objective:  Elevated blood pressure  /80 (BP Location: Right arm, Patient Position: Sitting)   Pulse 65   Resp 18   Ht 6' 1 5" (1 867 m)   Wt 109 kg (240 lb 1 6 oz)   SpO2 97%   BMI 31 25 kg/m²          Physical Exam   Constitutional: He is oriented to person, place, and time  He appears well-developed and well-nourished  No distress  HENT:   Head: Normocephalic  Eyes: Pupils are equal, round, and reactive to light  Neck: Normal range of motion  No JVD present  No thyromegaly present  Cardiovascular: Normal rate, regular rhythm, S1 normal and S2 normal  Exam reveals no gallop and no friction rub  Murmur heard  Crescendo systolic murmur is present with a grade of 2/6  Pulmonary/Chest: Effort normal and breath sounds normal  No respiratory distress  He has no wheezes  He has no rales  He exhibits no tenderness  Abdominal: Soft  Musculoskeletal: Normal range of motion  He exhibits no edema, tenderness or deformity  Neurological: He is alert and oriented to person, place, and time  Skin: Skin is warm and dry  He is not diaphoretic  Psychiatric: He has a normal mood and affect  Vitals reviewed

## 2019-12-05 DIAGNOSIS — F41.9 ANXIETY: ICD-10-CM

## 2019-12-05 DIAGNOSIS — J45.909 MODERATE ASTHMA, UNSPECIFIED WHETHER COMPLICATED, UNSPECIFIED WHETHER PERSISTENT: ICD-10-CM

## 2019-12-05 DIAGNOSIS — G62.9 NEUROPATHY: ICD-10-CM

## 2019-12-05 LAB — B2 MICROGLOB SERPL-MCNC: 2.3 MG/L (ref 0.6–2.4)

## 2019-12-05 RX ORDER — CITALOPRAM 40 MG/1
40 TABLET ORAL DAILY
Qty: 90 TABLET | Refills: 1 | Status: SHIPPED | OUTPATIENT
Start: 2019-12-05 | End: 2020-08-04

## 2019-12-05 RX ORDER — GABAPENTIN 600 MG/1
600 TABLET ORAL DAILY
Qty: 90 TABLET | Refills: 1 | Status: SHIPPED | OUTPATIENT
Start: 2019-12-05 | End: 2020-06-11

## 2019-12-09 ENCOUNTER — OFFICE VISIT (OUTPATIENT)
Dept: FAMILY MEDICINE CLINIC | Facility: CLINIC | Age: 64
End: 2019-12-09
Payer: MEDICARE

## 2019-12-09 ENCOUNTER — HOSPITAL ENCOUNTER (OUTPATIENT)
Dept: RADIOLOGY | Facility: HOSPITAL | Age: 64
Discharge: HOME | End: 2019-12-09
Payer: MEDICARE

## 2019-12-09 VITALS
SYSTOLIC BLOOD PRESSURE: 150 MMHG | HEIGHT: 74 IN | WEIGHT: 242 LBS | HEART RATE: 70 BPM | DIASTOLIC BLOOD PRESSURE: 80 MMHG | OXYGEN SATURATION: 96 % | BODY MASS INDEX: 31.06 KG/M2

## 2019-12-09 DIAGNOSIS — D59.10 AUTOIMMUNE HEMOLYTIC ANEMIA (HCC): ICD-10-CM

## 2019-12-09 DIAGNOSIS — Z23 ENCOUNTER FOR IMMUNIZATION: ICD-10-CM

## 2019-12-09 DIAGNOSIS — E09.9 STEROID-INDUCED DIABETES (HCC): Primary | ICD-10-CM

## 2019-12-09 DIAGNOSIS — I25.10 CORONARY ARTERY DISEASE INVOLVING NATIVE CORONARY ARTERY OF NATIVE HEART WITHOUT ANGINA PECTORIS: ICD-10-CM

## 2019-12-09 DIAGNOSIS — M54.2 NECK PAIN: ICD-10-CM

## 2019-12-09 DIAGNOSIS — D69.6 THROMBOCYTOPENIA (HCC): ICD-10-CM

## 2019-12-09 DIAGNOSIS — T38.0X5A STEROID-INDUCED DIABETES (HCC): Primary | ICD-10-CM

## 2019-12-09 DIAGNOSIS — I10 ESSENTIAL HYPERTENSION: ICD-10-CM

## 2019-12-09 DIAGNOSIS — D75.82 HIT (HEPARIN-INDUCED THROMBOCYTOPENIA) (HCC): ICD-10-CM

## 2019-12-09 DIAGNOSIS — C91.10 CHRONIC LYMPHOCYTIC LEUKEMIA (HCC): ICD-10-CM

## 2019-12-09 DIAGNOSIS — D80.1 HYPOGAMMAGLOBULINEMIA, ACQUIRED (HCC): ICD-10-CM

## 2019-12-09 PROCEDURE — 72050 X-RAY EXAM NECK SPINE 4/5VWS: CPT

## 2019-12-09 PROCEDURE — 99214 OFFICE O/P EST MOD 30 MIN: CPT | Performed by: INTERNAL MEDICINE

## 2019-12-09 PROCEDURE — 90670 PCV13 VACCINE IM: CPT

## 2019-12-09 PROCEDURE — G0009 ADMIN PNEUMOCOCCAL VACCINE: HCPCS

## 2019-12-09 NOTE — PROGRESS NOTES
Assessment/Plan:         Diagnoses and all orders for this visit:  Essential hypertension  Suboptimal   Patient was started motor pain soon along with losartan  He will continue with salt restriction  Coronary artery disease involving native coronary artery of native heart without angina pectoris  See discussion above    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[facility] [address] )  Continue with current program   history of 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[facility])  Platelet count stable  Thrombocytopenia (HCC)    Autoimmune hemolytic anemia ([facility])  Last H&H stable  Chronic lymphocytic leukemia ([facility] [address] )  Oral chemotherapy  Hypogammaglobulinemia, acquired ([facility])  IV IG  Neck pain  -     XR spine cervical complete 4 or 5 vw non injury; Future  Likely osteoarthritis  Encounter for immunization  -     PNEUMOCOCCAL CONJUGATE VACCINE 13-VALENT GREATER THAN 6 MONTHS    patient refuses flu vaccine  Subjective:      Patient ID: Blair Sommer is a 71 y o  male  HPI  Patient with history of hypertension coronary disease hyperlipidemia CLL thrombocytopenia autoimmune hemolytic anemia is here to follow-up on chronic medical problems  Recent appointments with respective specialist was reviewed  His blood pressure is somewhat on the higher side  Patient was recently started on amlodipine but has not taking the pill yet  Denies any chest pain shortness of breath lightheadedness palpitation  His last creatinine was slightly elevated last time as compared to the baseline  Patient denies use of NSAIDs on a regular basis but does have blood sugar issues when he takes steroids with chemo  He does have a insulin insert sliding scale to use at that time  He does see Endocrinology regularly  He reports improved swallowing since recent dilatation  Dyspepsia is controlled  Unfortunately I could not find his last colonoscopy  My staff will try to retrieve  He needs to continue with prednisone for thrombocytopenia    Last platelet counts which much improved  His 10 year cardiovascular risk factors quite elevated at 39 7  He has history of statin intolerance and is currently on fenofibrate  His HDL is also low  I discussed considering injectable PCSK9 inhibitors with Cardiology next visit  Patient denies any chest pain shortness of breath lightheadedness or palpitation  He has not smoked for several years  Last CT scan was satisfactory  The following portions of the patient's history were reviewed and updated as appropriate: allergies, current medications, past family history, past medical history, past social history, past surgical history and problem list     Review of Systems   Constitutional: Negative for chills and fever  HENT: Positive for trouble swallowing  Respiratory: Negative for cough and shortness of breath  Cardiovascular: Negative for chest pain, palpitations and leg swelling  Gastrointestinal: Positive for abdominal pain (As above)  Negative for blood in stool, constipation and diarrhea  Endocrine:        As above   Genitourinary: Negative for difficulty urinating, flank pain and frequency  Musculoskeletal: Positive for neck pain and neck stiffness  Neurological: Negative for dizziness  Hematological: Bruises/bleeds easily  Objective:      /80 (BP Location: Left arm, Patient Position: Sitting, Cuff Size: Standard)   Pulse 70   Ht 6' 1 5" (1 867 m)   Wt 110 kg (242 lb)   SpO2 96%   BMI 31 50 kg/m²          Physical Exam   HENT:   Mouth/Throat: Oropharynx is clear and moist    Eyes: Conjunctivae are normal    Neck: No thyromegaly present  Decreased range of motion of the neck increased trapezius muscle spasm   Cardiovascular: Normal rate, regular rhythm and normal heart sounds  Pulmonary/Chest: Effort normal and breath sounds normal  No stridor  No respiratory distress  He has no wheezes  Abdominal: Soft  Bowel sounds are normal  He exhibits no distension and no mass   There is no tenderness  There is no guarding  Musculoskeletal: He exhibits no edema  Neurological: He is alert  Skin: No pallor     Ecchymoses on the arms

## 2019-12-10 ENCOUNTER — TELEPHONE (OUTPATIENT)
Dept: HEMATOLOGY ONCOLOGY | Facility: CLINIC | Age: 64
End: 2019-12-10

## 2019-12-10 NOTE — TELEPHONE ENCOUNTER
Called and spoke to Gladys Fears, stating that per Dr Jennifer Kaiser he can take 200 mg with food and no more than 800 mg/day  Gladys Fears voiced understanding

## 2019-12-10 NOTE — TELEPHONE ENCOUNTER
Patient called stating that he would like to know if he can take ibuprofen in conjunction with his current medication  Best call back [phone]

## 2019-12-11 ENCOUNTER — TELEPHONE (OUTPATIENT)
Dept: FAMILY MEDICINE CLINIC | Facility: CLINIC | Age: 64
End: 2019-12-11

## 2019-12-11 ENCOUNTER — HOSPITAL ENCOUNTER (OUTPATIENT)
Dept: BONE DENSITY | Facility: MEDICAL CENTER | Age: 64
Discharge: HOME | End: 2019-12-11
Payer: MEDICARE

## 2019-12-11 DIAGNOSIS — M81.0 AGE-RELATED OSTEOPOROSIS WITHOUT CURRENT PATHOLOGICAL FRACTURE: ICD-10-CM

## 2019-12-11 PROCEDURE — 77080 DXA BONE DENSITY AXIAL: CPT

## 2019-12-11 NOTE — TELEPHONE ENCOUNTER
Pt called in requesting his x-ray results  These results aren't yet please advise when available thank you

## 2019-12-13 ENCOUNTER — TELEPHONE (OUTPATIENT)
Dept: FAMILY MEDICINE CLINIC | Facility: CLINIC | Age: 64
End: 2019-12-13

## 2019-12-13 NOTE — TELEPHONE ENCOUNTER
Pt called in checking the status of his x-ray results  Radiology was called spoke with technician results will be given to radiologist for review then results will be available in pt chart  Please review and advise pt by end of day of possible thank you

## 2019-12-14 DIAGNOSIS — G62.9 NEUROPATHY: ICD-10-CM

## 2019-12-16 RX ORDER — GABAPENTIN 600 MG/1
600 TABLET ORAL DAILY
Qty: 30 TABLET | Refills: 2 | Status: SHIPPED | OUTPATIENT
Start: 2019-12-16 | End: 2020-11-24 | Stop reason: SDUPTHER

## 2019-12-17 ENCOUNTER — HOSPITAL ENCOUNTER (OUTPATIENT)
Dept: INFUSION CENTER | Facility: CLINIC | Age: 64
Discharge: HOME | End: 2019-12-17
Payer: MEDICARE

## 2019-12-17 VITALS
DIASTOLIC BLOOD PRESSURE: 83 MMHG | SYSTOLIC BLOOD PRESSURE: 152 MMHG | RESPIRATION RATE: 18 BRPM | TEMPERATURE: 98 F | HEART RATE: 81 BPM

## 2019-12-17 DIAGNOSIS — C91.10 CLL (CHRONIC LYMPHOCYTIC LEUKEMIA) (HCC): Primary | ICD-10-CM

## 2019-12-17 DIAGNOSIS — D80.1 HYPOGAMMAGLOBULINEMIA, ACQUIRED (HCC): ICD-10-CM

## 2019-12-17 PROCEDURE — 96367 TX/PROPH/DG ADDL SEQ IV INF: CPT

## 2019-12-17 PROCEDURE — 96375 TX/PRO/DX INJ NEW DRUG ADDON: CPT

## 2019-12-17 PROCEDURE — 96366 THER/PROPH/DIAG IV INF ADDON: CPT

## 2019-12-17 PROCEDURE — 96365 THER/PROPH/DIAG IV INF INIT: CPT

## 2019-12-17 RX ORDER — ACETAMINOPHEN 325 MG/1
650 TABLET ORAL ONCE
Status: COMPLETED | OUTPATIENT
Start: 2019-12-17 | End: 2019-12-17

## 2019-12-17 RX ORDER — ACETAMINOPHEN 325 MG/1
650 TABLET ORAL ONCE
Status: CANCELLED | OUTPATIENT
Start: 2020-01-14

## 2019-12-17 RX ORDER — SODIUM CHLORIDE 9 MG/ML
20 INJECTION, SOLUTION INTRAVENOUS ONCE
Status: COMPLETED | OUTPATIENT
Start: 2019-12-17 | End: 2019-12-17

## 2019-12-17 RX ORDER — SODIUM CHLORIDE 9 MG/ML
20 INJECTION, SOLUTION INTRAVENOUS ONCE
Status: CANCELLED | OUTPATIENT
Start: 2020-01-14

## 2019-12-17 RX ADMIN — ACETAMINOPHEN 650 MG: 325 TABLET, FILM COATED ORAL at 08:40

## 2019-12-17 RX ADMIN — DIPHENHYDRAMINE HYDROCHLORIDE 12.5 MG: 50 INJECTION, SOLUTION INTRAMUSCULAR; INTRAVENOUS at 08:48

## 2019-12-17 RX ADMIN — Medication 22.5 G: at 09:16

## 2019-12-17 RX ADMIN — HYDROCORTISONE SODIUM SUCCINATE 100 MG: 100 INJECTION, POWDER, FOR SOLUTION INTRAMUSCULAR; INTRAVENOUS at 08:42

## 2019-12-17 RX ADMIN — SODIUM CHLORIDE 20 ML/HR: 0.9 INJECTION, SOLUTION INTRAVENOUS at 08:10

## 2019-12-17 NOTE — PROGRESS NOTES
Patient to the unit for IVIG  Tolerated IVIG infusion without adverse reaction  Declined AVS, aware of next appointment  Voices no questions or other concerns

## 2019-12-17 NOTE — PLAN OF CARE
Problem: Potential for Falls  Goal: Patient will remain free of falls  Description  INTERVENTIONS:  - Assess patient frequently for physical needs  -  Identify cognitive and physical deficits and behaviors that affect risk of falls    -  [place] fall precautions as indicated by assessment   - Educate patient/family on patient safety including physical limitations  - Instruct patient to call for assistance with activity based on assessment  - Modify environment to reduce risk of injury  - Consider OT/PT consult to assist with strengthening/mobility  Outcome: Progressing

## 2019-12-17 NOTE — PLAN OF CARE
Problem: Potential for Falls  Goal: Patient will remain free of falls  Description  INTERVENTIONS:  - Assess patient frequently for physical needs  -  Identify cognitive and physical deficits and behaviors that affect risk of falls    -  [place] fall precautions as indicated by assessment   - Educate patient/family on patient safety including physical limitations  - Instruct patient to call for assistance with activity based on assessment  - Modify environment to reduce risk of injury  - Consider OT/PT consult to assist with strengthening/mobility  12/17/2019 1015 by Keesha Huitron RN  Outcome: Progressing  12/17/2019 1015 by Keesha Huitron RN  Outcome: Progressing     Problem: PAIN - ADULT  Goal: Verbalizes/displays adequate comfort level or baseline comfort level  Description  Interventions:  - Encourage patient to monitor pain and request assistance  - Assess pain using appropriate pain scale  - Administer analgesics based on type and severity of pain and evaluate response  - Implement non-pharmacological measures as appropriate and evaluate response  - Consider cultural and social influences on pain and pain management  - Notify physician/advanced practitioner if interventions unsuccessful or patient reports new pain  Outcome: Progressing     Problem: INFECTION - ADULT  Goal: Absence or prevention of progression during hospitalization  Description  INTERVENTIONS:  - Assess and monitor for signs and symptoms of infection  - Monitor lab/diagnostic results  - Monitor all insertion sites, i e  indwelling lines, tubes, and drains  - Monitor endotracheal if appropriate and nasal secretions for changes in amount and color  - [place] appropriate cooling/warming therapies per order  - Administer medications as ordered  - Instruct and encourage patient and family to use good hand hygiene technique  - Identify and instruct in appropriate isolation precautions for identified infection/condition  Outcome: Progressing Problem: DISCHARGE PLANNING  Goal: Discharge to home or other facility with appropriate resources  Description  INTERVENTIONS:  - Identify barriers to discharge w/patient and caregiver  - Arrange for needed discharge resources and transportation as appropriate  - Identify discharge learning needs (meds, wound care, etc )  - Arrange for interpretive services to assist at discharge as needed  - Refer to Case Management Department for coordinating discharge planning if the patient needs post-hospital services based on physician/advanced practitioner order or complex needs related to functional status, cognitive ability, or social support system  Outcome: Progressing     Problem: Knowledge Deficit  Goal: Patient/family/caregiver demonstrates understanding of disease process, treatment plan, medications, and discharge instructions  Description  Complete learning assessment and assess knowledge base    Interventions:  - Provide teaching at level of understanding  - Provide teaching via preferred learning methods  Outcome: Progressing

## 2019-12-30 RX ORDER — SODIUM CHLORIDE 9 MG/ML
20 INJECTION, SOLUTION INTRAVENOUS ONCE
Status: CANCELLED | OUTPATIENT
Start: 2019-12-31

## 2019-12-30 RX ORDER — ACETAMINOPHEN 325 MG/1
650 TABLET ORAL ONCE
Status: CANCELLED | OUTPATIENT
Start: 2019-12-31

## 2019-12-31 ENCOUNTER — HOSPITAL ENCOUNTER (OUTPATIENT)
Dept: INFUSION CENTER | Facility: CLINIC | Age: 64
Discharge: HOME | End: 2019-12-31
Payer: MEDICARE

## 2019-12-31 VITALS
RESPIRATION RATE: 18 BRPM | SYSTOLIC BLOOD PRESSURE: 164 MMHG | HEART RATE: 64 BPM | TEMPERATURE: 98.6 F | HEIGHT: 74 IN | DIASTOLIC BLOOD PRESSURE: 90 MMHG | BODY MASS INDEX: 30.84 KG/M2 | WEIGHT: 240.3 LBS

## 2019-12-31 DIAGNOSIS — D80.1 HYPOGAMMAGLOBULINEMIA, ACQUIRED (HCC): ICD-10-CM

## 2019-12-31 DIAGNOSIS — C91.10 CLL (CHRONIC LYMPHOCYTIC LEUKEMIA) (HCC): Primary | ICD-10-CM

## 2019-12-31 LAB
ALBUMIN SERPL BCP-MCNC: 3.5 G/DL (ref 3.5–5.7)
ALP SERPL-CCNC: 60 U/L (ref 46–116)
ALT SERPL W P-5'-P-CCNC: 53 U/L (ref 12–78)
ANION GAP SERPL CALCULATED.3IONS-SCNC: 6 MMOL/L (ref 4–13)
AST SERPL W P-5'-P-CCNC: 38 U/L (ref 5–45)
BASOPHILS # BLD MANUAL: 0 THOUSAND/UL (ref 0–0.1)
BASOPHILS NFR MAR MANUAL: 0 % (ref 0–1)
BILIRUB SERPL-MCNC: 0.6 MG/DL (ref 0.2–1)
BUN SERPL-MCNC: 17 MG/DL (ref 5–25)
CALCIUM SERPL-MCNC: 9.2 MG/DL (ref 8.3–10.1)
CHLORIDE SERPL-SCNC: 105 MMOL/L (ref 98–108)
CO2 SERPL-SCNC: 27 MMOL/L (ref 21–32)
CREAT SERPL-MCNC: 1.2 MG/DL (ref 0.6–1.3)
EOSINOPHIL # BLD MANUAL: 0.18 THOUSAND/UL (ref 0–0.4)
EOSINOPHIL NFR BLD MANUAL: 5 % (ref 0–6)
ERYTHROCYTE [DISTWIDTH] IN BLOOD BY AUTOMATED COUNT: 13.9 % (ref 11.6–15.1)
GFR SERPL CREATININE-BSD FRML MDRD: 63 ML/MIN/1.73SQ M
GLUCOSE SERPL-MCNC: 189 MG/DL (ref 65–140)
HCT VFR BLD AUTO: 43.3 % (ref 36.5–49.3)
HGB BLD-MCNC: 14.2 G/DL (ref 12–17)
HGB RETIC QN AUTO: 33.3 PG (ref 30–38.3)
IGG SERPL-MCNC: 551 MG/DL (ref 700–1600)
IMM RETICS NFR: 15 % (ref 0–14)
LDH SERPL-CCNC: 218 U/L (ref 81–234)
LYMPHOCYTES # BLD AUTO: 0.66 THOUSAND/UL (ref 0.6–4.47)
LYMPHOCYTES # BLD AUTO: 18 % (ref 14–44)
MCH RBC QN AUTO: 29.7 PG (ref 26.8–34.3)
MCHC RBC AUTO-ENTMCNC: 32.8 G/DL (ref 31.4–37.4)
MCV RBC AUTO: 91 FL (ref 82–98)
MONOCYTES # BLD AUTO: 0.48 THOUSAND/UL (ref 0–1.22)
MONOCYTES NFR BLD: 13 % (ref 4–12)
NEUTROPHILS # BLD MANUAL: 2.36 THOUSAND/UL (ref 1.85–7.62)
NEUTS BAND NFR BLD MANUAL: 2 % (ref 0–8)
NEUTS SEG NFR BLD AUTO: 62 % (ref 43–75)
PLATELET # BLD AUTO: 136 THOUSANDS/UL (ref 149–390)
PLATELET BLD QL SMEAR: ABNORMAL
PMV BLD AUTO: 11.2 FL (ref 8.9–12.7)
POTASSIUM SERPL-SCNC: 3.9 MMOL/L (ref 3.5–5.3)
PROT SERPL-MCNC: 6.2 G/DL (ref 6.4–8.2)
RBC # BLD AUTO: 4.78 MILLION/UL (ref 3.88–5.62)
RBC MORPH BLD: NORMAL
RETICS # AUTO: ABNORMAL 10*3/UL (ref 14356–105094)
RETICS # CALC: 2.33 % (ref 0.37–1.87)
SODIUM SERPL-SCNC: 138 MMOL/L (ref 136–145)
TOTAL CELLS COUNTED SPEC: 100
URATE SERPL-MCNC: 3.4 MG/DL (ref 4.2–8)
WBC # BLD AUTO: 3.68 THOUSAND/UL (ref 4.31–10.16)

## 2019-12-31 PROCEDURE — 82784 ASSAY IGA/IGD/IGG/IGM EACH: CPT | Performed by: INTERNAL MEDICINE

## 2019-12-31 PROCEDURE — 96367 TX/PROPH/DG ADDL SEQ IV INF: CPT

## 2019-12-31 PROCEDURE — 80053 COMPREHEN METABOLIC PANEL: CPT | Performed by: INTERNAL MEDICINE

## 2019-12-31 PROCEDURE — 84550 ASSAY OF BLOOD/URIC ACID: CPT | Performed by: INTERNAL MEDICINE

## 2019-12-31 PROCEDURE — 96415 CHEMO IV INFUSION ADDL HR: CPT

## 2019-12-31 PROCEDURE — 85046 RETICYTE/HGB CONCENTRATE: CPT | Performed by: INTERNAL MEDICINE

## 2019-12-31 PROCEDURE — 85027 COMPLETE CBC AUTOMATED: CPT | Performed by: INTERNAL MEDICINE

## 2019-12-31 PROCEDURE — 83615 LACTATE (LD) (LDH) ENZYME: CPT | Performed by: INTERNAL MEDICINE

## 2019-12-31 PROCEDURE — 82232 ASSAY OF BETA-2 PROTEIN: CPT | Performed by: INTERNAL MEDICINE

## 2019-12-31 PROCEDURE — 96413 CHEMO IV INFUSION 1 HR: CPT

## 2019-12-31 PROCEDURE — 85007 BL SMEAR W/DIFF WBC COUNT: CPT | Performed by: INTERNAL MEDICINE

## 2019-12-31 RX ORDER — ACETAMINOPHEN 325 MG/1
650 TABLET ORAL ONCE
Status: COMPLETED | OUTPATIENT
Start: 2019-12-31 | End: 2019-12-31

## 2019-12-31 RX ORDER — SODIUM CHLORIDE 9 MG/ML
20 INJECTION, SOLUTION INTRAVENOUS ONCE
Status: COMPLETED | OUTPATIENT
Start: 2019-12-31 | End: 2019-12-31

## 2019-12-31 RX ADMIN — SODIUM CHLORIDE 20 ML/HR: 0.9 INJECTION, SOLUTION INTRAVENOUS at 08:20

## 2019-12-31 RX ADMIN — DEXAMETHASONE SODIUM PHOSPHATE 20 MG: 10 INJECTION, SOLUTION INTRAMUSCULAR; INTRAVENOUS at 08:38

## 2019-12-31 RX ADMIN — DIPHENHYDRAMINE HYDROCHLORIDE 25 MG: 50 INJECTION, SOLUTION INTRAMUSCULAR; INTRAVENOUS at 08:59

## 2019-12-31 RX ADMIN — ACETAMINOPHEN 650 MG: 325 TABLET, FILM COATED ORAL at 08:41

## 2019-12-31 RX ADMIN — OBINUTUZUMAB 1000 MG: 1000 INJECTION, SOLUTION, CONCENTRATE INTRAVENOUS at 09:45

## 2019-12-31 NOTE — PLAN OF CARE
Problem: PAIN - ADULT  Goal: Verbalizes/displays adequate comfort level or baseline comfort level  Description  Interventions:  - Encourage patient to monitor pain and request assistance  - Assess pain using appropriate pain scale  - Administer analgesics based on type and severity of pain and evaluate response  - Implement non-pharmacological measures as appropriate and evaluate response  - Consider cultural and social influences on pain and pain management  - Notify physician/advanced practitioner if interventions unsuccessful or patient reports new pain  Outcome: Progressing     Problem: INFECTION - ADULT  Goal: Absence or prevention of progression during hospitalization  Description  INTERVENTIONS:  - Assess and monitor for signs and symptoms of infection  - Monitor lab/diagnostic results  - Monitor all insertion sites, i e  indwelling lines, tubes, and drains  - Monitor endotracheal if appropriate and nasal secretions for changes in amount and color  - [place] appropriate cooling/warming therapies per order  - Administer medications as ordered  - Instruct and encourage patient and family to use good hand hygiene technique  - Identify and instruct in appropriate isolation precautions for identified infection/condition  Outcome: Progressing  Goal: Absence of fever/infection during neutropenic period  Description  INTERVENTIONS:  - Monitor WBC    Outcome: Progressing     Problem: Knowledge Deficit  Goal: Patient/family/caregiver demonstrates understanding of disease process, treatment plan, medications, and discharge instructions  Description  Complete learning assessment and assess knowledge base    Interventions:  - Provide teaching at level of understanding  - Provide teaching via preferred learning methods  Outcome: Progressing

## 2019-12-31 NOTE — PROGRESS NOTES
Pt  Denied new symptoms or concerns today  Labs obtained and reviewed  Pt  Completed [place] without adverse event  Future appointments reviewed   Declined AVS

## 2020-01-02 LAB — B2 MICROGLOB SERPL-MCNC: 2.3 MG/L (ref 0.6–2.4)

## 2020-01-06 ENCOUNTER — TELEPHONE (OUTPATIENT)
Dept: CARDIOLOGY CLINIC | Facility: CLINIC | Age: 65
End: 2020-01-06

## 2020-01-06 NOTE — TELEPHONE ENCOUNTER
Patient called stating that his BP yesterday was 200/105 on the Losartan  He wanted to make sure that Dr Anastasia Aguilar was aware of this  His BP went down a little but is still running high  Would like Dr Anastasia Aguilar to call him and see what he wants him to do now with his medications or if he would like the doctor to see him in the office

## 2020-01-08 DIAGNOSIS — R13.19 ESOPHAGEAL DYSPHAGIA: ICD-10-CM

## 2020-01-08 DIAGNOSIS — C91.10 CLL (CHRONIC LYMPHOCYTIC LEUKEMIA) (HCC): ICD-10-CM

## 2020-01-08 DIAGNOSIS — K21.9 GASTROESOPHAGEAL REFLUX DISEASE WITHOUT ESOPHAGITIS: ICD-10-CM

## 2020-01-08 RX ORDER — PANTOPRAZOLE SODIUM 40 MG/1
40 TABLET, DELAYED RELEASE ORAL DAILY
Qty: 90 TABLET | Refills: 3 | Status: SHIPPED | OUTPATIENT
Start: 2020-01-08 | End: 2020-12-28 | Stop reason: SDUPTHER

## 2020-01-08 RX ORDER — PREDNISONE 1 MG/1
5 TABLET ORAL DAILY
Qty: 90 TABLET | Refills: 2 | Status: SHIPPED | OUTPATIENT
Start: 2020-01-08 | End: 2020-03-11 | Stop reason: SDUPTHER

## 2020-01-08 NOTE — TELEPHONE ENCOUNTER
GI Physician: Dr Mauricia Sandifer for Medication: pantoprazole      Dose: 1x daily     Quantity: 90 day     [facility]     Pt advise if the pt should be using this medication 1 or 2x daily

## 2020-01-08 NOTE — TELEPHONE ENCOUNTER
Needs refill of prednisone sent to [facility] with a 90 day supply  Patient takes 5mg daily as stated in Dr Mejia's last OV note  Patient has" 7 or 8 pills left"  Patient also questioning his call coming to a central location, explained the [place] process to the patient

## 2020-01-09 DIAGNOSIS — C91.10 CHRONIC LYMPHATIC LEUKEMIA (HCC): ICD-10-CM

## 2020-01-14 ENCOUNTER — HOSPITAL ENCOUNTER (OUTPATIENT)
Dept: INFUSION CENTER | Facility: CLINIC | Age: 65
Discharge: HOME | End: 2020-01-14
Payer: MEDICARE

## 2020-01-14 VITALS
HEART RATE: 82 BPM | DIASTOLIC BLOOD PRESSURE: 81 MMHG | RESPIRATION RATE: 18 BRPM | TEMPERATURE: 98.3 F | WEIGHT: 238.65 LBS | SYSTOLIC BLOOD PRESSURE: 136 MMHG | BODY MASS INDEX: 31.06 KG/M2

## 2020-01-14 DIAGNOSIS — D80.1 HYPOGAMMAGLOBULINEMIA, ACQUIRED (HCC): ICD-10-CM

## 2020-01-14 DIAGNOSIS — C91.10 CLL (CHRONIC LYMPHOCYTIC LEUKEMIA) (HCC): Primary | ICD-10-CM

## 2020-01-14 PROCEDURE — 96367 TX/PROPH/DG ADDL SEQ IV INF: CPT

## 2020-01-14 PROCEDURE — 96365 THER/PROPH/DIAG IV INF INIT: CPT

## 2020-01-14 PROCEDURE — 96366 THER/PROPH/DIAG IV INF ADDON: CPT

## 2020-01-14 PROCEDURE — 96375 TX/PRO/DX INJ NEW DRUG ADDON: CPT

## 2020-01-14 RX ORDER — SODIUM CHLORIDE 9 MG/ML
20 INJECTION, SOLUTION INTRAVENOUS ONCE
Status: CANCELLED | OUTPATIENT
Start: 2020-02-11

## 2020-01-14 RX ORDER — ACETAMINOPHEN 325 MG/1
650 TABLET ORAL ONCE
Status: COMPLETED | OUTPATIENT
Start: 2020-01-14 | End: 2020-01-14

## 2020-01-14 RX ORDER — ACETAMINOPHEN 325 MG/1
650 TABLET ORAL ONCE
Status: CANCELLED | OUTPATIENT
Start: 2020-02-11

## 2020-01-14 RX ORDER — SODIUM CHLORIDE 9 MG/ML
20 INJECTION, SOLUTION INTRAVENOUS ONCE
Status: COMPLETED | OUTPATIENT
Start: 2020-01-14 | End: 2020-01-14

## 2020-01-14 RX ADMIN — Medication 22.5 G: at 09:14

## 2020-01-14 RX ADMIN — SODIUM CHLORIDE 20 ML/HR: 0.9 INJECTION, SOLUTION INTRAVENOUS at 08:43

## 2020-01-14 RX ADMIN — DIPHENHYDRAMINE HYDROCHLORIDE 12.5 MG: 50 INJECTION, SOLUTION INTRAMUSCULAR; INTRAVENOUS at 08:43

## 2020-01-14 RX ADMIN — HYDROCORTISONE SODIUM SUCCINATE 100 MG: 100 INJECTION, POWDER, FOR SOLUTION INTRAMUSCULAR; INTRAVENOUS at 08:38

## 2020-01-14 RX ADMIN — ACETAMINOPHEN 650 MG: 325 TABLET, FILM COATED ORAL at 08:38

## 2020-01-14 NOTE — PLAN OF CARE

## 2020-01-20 ENCOUNTER — TELEPHONE (OUTPATIENT)
Dept: CARDIOLOGY CLINIC | Facility: CLINIC | Age: 65
End: 2020-01-20

## 2020-01-20 DIAGNOSIS — I10 ESSENTIAL HYPERTENSION: ICD-10-CM

## 2020-01-20 DIAGNOSIS — Z00.00 PREVENTATIVE HEALTH CARE: ICD-10-CM

## 2020-01-20 RX ORDER — AMLODIPINE BESYLATE 10 MG/1
10 TABLET ORAL DAILY
Qty: 90 TABLET | Refills: 3 | Status: SHIPPED | OUTPATIENT
Start: 2020-01-20 | End: 2020-12-30

## 2020-01-20 RX ORDER — LOSARTAN POTASSIUM 100 MG/1
100 TABLET ORAL DAILY
Qty: 90 TABLET | Refills: 1 | Status: SHIPPED | OUTPATIENT
Start: 2020-01-20 | End: 2020-08-28 | Stop reason: SDUPTHER

## 2020-01-21 DIAGNOSIS — G89.3 CANCER RELATED PAIN: ICD-10-CM

## 2020-01-21 RX ORDER — OXYCODONE HYDROCHLORIDE 10 MG/1
10 TABLET ORAL EVERY 6 HOURS PRN
Qty: 90 TABLET | Refills: 0 | Status: SHIPPED | OUTPATIENT
Start: 2020-01-21 | End: 2020-05-06 | Stop reason: SDUPTHER

## 2020-01-23 RX ORDER — SODIUM CHLORIDE 9 MG/ML
20 INJECTION, SOLUTION INTRAVENOUS ONCE
Status: CANCELLED | OUTPATIENT
Start: 2020-01-28

## 2020-01-23 RX ORDER — ACETAMINOPHEN 325 MG/1
650 TABLET ORAL ONCE
Status: CANCELLED | OUTPATIENT
Start: 2020-01-28

## 2020-01-27 ENCOUNTER — TELEPHONE (OUTPATIENT)
Dept: CARDIOLOGY CLINIC | Facility: CLINIC | Age: 65
End: 2020-01-27

## 2020-01-27 DIAGNOSIS — I25.10 CORONARY ARTERY DISEASE INVOLVING NATIVE CORONARY ARTERY OF NATIVE HEART WITHOUT ANGINA PECTORIS: Primary | ICD-10-CM

## 2020-01-27 NOTE — TELEPHONE ENCOUNTER
Patient called as he did not see an order in for him to get his cholesterol checked and he is going to the [facility] [place] and would get all of his blood work done then  Please put an order in if he needs one

## 2020-01-28 ENCOUNTER — TRANSCRIBE ORDERS (OUTPATIENT)
Dept: INFUSION CENTER | Facility: CLINIC | Age: 65
End: 2020-01-28

## 2020-01-28 ENCOUNTER — APPOINTMENT (OUTPATIENT)
Dept: LAB | Facility: CLINIC | Age: 65
End: 2020-01-28
Payer: MEDICARE

## 2020-01-28 ENCOUNTER — HOSPITAL ENCOUNTER (OUTPATIENT)
Dept: INFUSION CENTER | Facility: CLINIC | Age: 65
Discharge: HOME | End: 2020-01-28
Payer: MEDICARE

## 2020-01-28 VITALS
HEART RATE: 70 BPM | RESPIRATION RATE: 18 BRPM | WEIGHT: 240.52 LBS | HEIGHT: 74 IN | BODY MASS INDEX: 30.87 KG/M2 | DIASTOLIC BLOOD PRESSURE: 82 MMHG | TEMPERATURE: 97.5 F | SYSTOLIC BLOOD PRESSURE: 164 MMHG

## 2020-01-28 DIAGNOSIS — I25.10 CORONARY ARTERY DISEASE INVOLVING NATIVE CORONARY ARTERY OF NATIVE HEART WITHOUT ANGINA PECTORIS: ICD-10-CM

## 2020-01-28 DIAGNOSIS — C91.10 CLL (CHRONIC LYMPHOCYTIC LEUKEMIA) (HCC): Primary | ICD-10-CM

## 2020-01-28 DIAGNOSIS — D80.1 HYPOGAMMAGLOBULINEMIA, ACQUIRED (HCC): ICD-10-CM

## 2020-01-28 LAB
ALBUMIN SERPL BCP-MCNC: 3.9 G/DL (ref 3.5–5.7)
ALP SERPL-CCNC: 58 U/L (ref 46–116)
ALT SERPL W P-5'-P-CCNC: 48 U/L (ref 12–78)
ANION GAP SERPL CALCULATED.3IONS-SCNC: 6 MMOL/L (ref 4–13)
AST SERPL W P-5'-P-CCNC: 33 U/L (ref 5–45)
BASOPHILS # BLD MANUAL: 0.1 THOUSAND/UL (ref 0–0.1)
BASOPHILS NFR MAR MANUAL: 2 % (ref 0–1)
BILIRUB SERPL-MCNC: 0.7 MG/DL (ref 0.2–1)
BUN SERPL-MCNC: 20 MG/DL (ref 5–25)
CALCIUM SERPL-MCNC: 9.9 MG/DL (ref 8.3–10.1)
CHLORIDE SERPL-SCNC: 106 MMOL/L (ref 98–108)
CHOLEST SERPL-MCNC: 192 MG/DL (ref 50–200)
CO2 SERPL-SCNC: 27 MMOL/L (ref 21–32)
CREAT SERPL-MCNC: 1.1 MG/DL (ref 0.6–1.3)
EOSINOPHIL # BLD MANUAL: 0.29 THOUSAND/UL (ref 0–0.4)
EOSINOPHIL NFR BLD MANUAL: 6 % (ref 0–6)
ERYTHROCYTE [DISTWIDTH] IN BLOOD BY AUTOMATED COUNT: 13.7 % (ref 11.6–15.1)
GFR SERPL CREATININE-BSD FRML MDRD: 70 ML/MIN/1.73SQ M
GLUCOSE SERPL-MCNC: 206 MG/DL (ref 65–140)
HCT VFR BLD AUTO: 44.2 % (ref 36.5–49.3)
HDLC SERPL-MCNC: 32 MG/DL
HGB BLD-MCNC: 14.6 G/DL (ref 12–17)
HGB RETIC QN AUTO: 36.1 PG (ref 30–38.3)
IGG SERPL-MCNC: 558 MG/DL (ref 700–1600)
IMM RETICS NFR: 16 % (ref 0–14)
LDH SERPL-CCNC: 214 U/L (ref 81–234)
LDLC SERPL CALC-MCNC: 121 MG/DL (ref 0–100)
LYMPHOCYTES # BLD AUTO: 0.71 THOUSAND/UL (ref 0.6–4.47)
LYMPHOCYTES # BLD AUTO: 15 % (ref 14–44)
MCH RBC QN AUTO: 29.9 PG (ref 26.8–34.3)
MCHC RBC AUTO-ENTMCNC: 33 G/DL (ref 31.4–37.4)
MCV RBC AUTO: 90 FL (ref 82–98)
MONOCYTES # BLD AUTO: 0.52 THOUSAND/UL (ref 0–1.22)
MONOCYTES NFR BLD: 11 % (ref 4–12)
NEUTROPHILS # BLD MANUAL: 3.14 THOUSAND/UL (ref 1.85–7.62)
NEUTS SEG NFR BLD AUTO: 66 % (ref 43–75)
OVALOCYTES BLD QL SMEAR: PRESENT
PLATELET # BLD AUTO: 135 THOUSANDS/UL (ref 149–390)
PLATELET BLD QL SMEAR: ABNORMAL
PMV BLD AUTO: 11.6 FL (ref 8.9–12.7)
POTASSIUM SERPL-SCNC: 3.9 MMOL/L (ref 3.5–5.3)
PROT SERPL-MCNC: 6.6 G/DL (ref 6.4–8.2)
RBC # BLD AUTO: 4.89 MILLION/UL (ref 3.88–5.62)
RETICS # AUTO: ABNORMAL 10*3/UL (ref 14356–105094)
RETICS # CALC: 2.47 % (ref 0.37–1.87)
SODIUM SERPL-SCNC: 139 MMOL/L (ref 136–145)
TOTAL CELLS COUNTED SPEC: 100
TRIGL SERPL-MCNC: 197 MG/DL
URATE SERPL-MCNC: 3.6 MG/DL (ref 4.2–8)
WBC # BLD AUTO: 4.75 THOUSAND/UL (ref 4.31–10.16)

## 2020-01-28 PROCEDURE — 84550 ASSAY OF BLOOD/URIC ACID: CPT | Performed by: INTERNAL MEDICINE

## 2020-01-28 PROCEDURE — 96413 CHEMO IV INFUSION 1 HR: CPT

## 2020-01-28 PROCEDURE — 96367 TX/PROPH/DG ADDL SEQ IV INF: CPT

## 2020-01-28 PROCEDURE — 82232 ASSAY OF BETA-2 PROTEIN: CPT | Performed by: INTERNAL MEDICINE

## 2020-01-28 PROCEDURE — 80053 COMPREHEN METABOLIC PANEL: CPT | Performed by: INTERNAL MEDICINE

## 2020-01-28 PROCEDURE — 80061 LIPID PANEL: CPT

## 2020-01-28 PROCEDURE — 96415 CHEMO IV INFUSION ADDL HR: CPT

## 2020-01-28 PROCEDURE — 85046 RETICYTE/HGB CONCENTRATE: CPT | Performed by: INTERNAL MEDICINE

## 2020-01-28 PROCEDURE — 82784 ASSAY IGA/IGD/IGG/IGM EACH: CPT | Performed by: INTERNAL MEDICINE

## 2020-01-28 PROCEDURE — 85007 BL SMEAR W/DIFF WBC COUNT: CPT | Performed by: INTERNAL MEDICINE

## 2020-01-28 PROCEDURE — 85027 COMPLETE CBC AUTOMATED: CPT | Performed by: INTERNAL MEDICINE

## 2020-01-28 PROCEDURE — 83615 LACTATE (LD) (LDH) ENZYME: CPT | Performed by: INTERNAL MEDICINE

## 2020-01-28 PROCEDURE — 36415 COLL VENOUS BLD VENIPUNCTURE: CPT

## 2020-01-28 RX ORDER — ACETAMINOPHEN 325 MG/1
650 TABLET ORAL ONCE
Status: COMPLETED | OUTPATIENT
Start: 2020-01-28 | End: 2020-01-28

## 2020-01-28 RX ORDER — SODIUM CHLORIDE 9 MG/ML
20 INJECTION, SOLUTION INTRAVENOUS ONCE
Status: COMPLETED | OUTPATIENT
Start: 2020-01-28 | End: 2020-01-28

## 2020-01-28 RX ADMIN — SODIUM CHLORIDE 20 ML/HR: 0.9 INJECTION, SOLUTION INTRAVENOUS at 08:35

## 2020-01-28 RX ADMIN — DIPHENHYDRAMINE HYDROCHLORIDE 25 MG: 50 INJECTION, SOLUTION INTRAMUSCULAR; INTRAVENOUS at 09:01

## 2020-01-28 RX ADMIN — ACETAMINOPHEN 650 MG: 325 TABLET, FILM COATED ORAL at 09:00

## 2020-01-28 RX ADMIN — OBINUTUZUMAB 1000 MG: 1000 INJECTION, SOLUTION, CONCENTRATE INTRAVENOUS at 09:58

## 2020-01-28 RX ADMIN — DEXAMETHASONE SODIUM PHOSPHATE 20 MG: 10 INJECTION, SOLUTION INTRAMUSCULAR; INTRAVENOUS at 08:35

## 2020-01-28 NOTE — PLAN OF CARE
Problem: Potential for Falls  Goal: Patient will remain free of falls  Description  INTERVENTIONS:  - Assess patient frequently for physical needs  -  Identify cognitive and physical deficits and behaviors that affect risk of falls    -  [place] fall precautions as indicated by assessment   - Educate patient/family on patient safety including physical limitations  - Instruct patient to call for assistance with activity based on assessment  - Modify environment to reduce risk of injury  - Consider OT/PT consult to assist with strengthening/mobility  Outcome: Progressing

## 2020-01-30 LAB — B2 MICROGLOB SERPL-MCNC: 2.3 MG/L (ref 0.6–2.4)

## 2020-02-11 ENCOUNTER — HOSPITAL ENCOUNTER (OUTPATIENT)
Dept: INFUSION CENTER | Facility: CLINIC | Age: 65
Discharge: HOME | End: 2020-02-11
Payer: MEDICARE

## 2020-02-11 VITALS
TEMPERATURE: 98.1 F | HEART RATE: 72 BPM | SYSTOLIC BLOOD PRESSURE: 147 MMHG | DIASTOLIC BLOOD PRESSURE: 78 MMHG | RESPIRATION RATE: 16 BRPM | WEIGHT: 242.51 LBS | BODY MASS INDEX: 31.56 KG/M2

## 2020-02-11 DIAGNOSIS — C91.10 CLL (CHRONIC LYMPHOCYTIC LEUKEMIA) (HCC): Primary | ICD-10-CM

## 2020-02-11 DIAGNOSIS — D80.1 HYPOGAMMAGLOBULINEMIA, ACQUIRED (HCC): ICD-10-CM

## 2020-02-11 PROCEDURE — 96375 TX/PRO/DX INJ NEW DRUG ADDON: CPT

## 2020-02-11 PROCEDURE — 96365 THER/PROPH/DIAG IV INF INIT: CPT

## 2020-02-11 PROCEDURE — 96367 TX/PROPH/DG ADDL SEQ IV INF: CPT

## 2020-02-11 RX ORDER — ACETAMINOPHEN 325 MG/1
650 TABLET ORAL ONCE
Status: COMPLETED | OUTPATIENT
Start: 2020-02-11 | End: 2020-02-11

## 2020-02-11 RX ORDER — ACETAMINOPHEN 325 MG/1
650 TABLET ORAL ONCE
Status: CANCELLED | OUTPATIENT
Start: 2020-03-10

## 2020-02-11 RX ORDER — SODIUM CHLORIDE 9 MG/ML
20 INJECTION, SOLUTION INTRAVENOUS ONCE
Status: COMPLETED | OUTPATIENT
Start: 2020-02-11 | End: 2020-02-11

## 2020-02-11 RX ORDER — SODIUM CHLORIDE 9 MG/ML
20 INJECTION, SOLUTION INTRAVENOUS ONCE
Status: CANCELLED | OUTPATIENT
Start: 2020-03-10

## 2020-02-11 RX ADMIN — ACETAMINOPHEN 650 MG: 325 TABLET, FILM COATED ORAL at 08:37

## 2020-02-11 RX ADMIN — SODIUM CHLORIDE 20 ML/HR: 0.9 INJECTION, SOLUTION INTRAVENOUS at 08:33

## 2020-02-11 RX ADMIN — HYDROCORTISONE SODIUM SUCCINATE 100 MG: 100 INJECTION, POWDER, FOR SOLUTION INTRAMUSCULAR; INTRAVENOUS at 08:55

## 2020-02-11 RX ADMIN — DIPHENHYDRAMINE HYDROCHLORIDE 12.5 MG: 50 INJECTION, SOLUTION INTRAMUSCULAR; INTRAVENOUS at 08:33

## 2020-02-11 RX ADMIN — Medication 22.5 G: at 09:14

## 2020-02-21 DIAGNOSIS — G47.00 INSOMNIA, UNSPECIFIED TYPE: Primary | ICD-10-CM

## 2020-02-21 RX ORDER — ZOLPIDEM TARTRATE 5 MG/1
5 TABLET ORAL
Qty: 30 TABLET | Refills: 0 | Status: SHIPPED | OUTPATIENT
Start: 2020-02-21

## 2020-02-21 RX ORDER — ACETAMINOPHEN 325 MG/1
650 TABLET ORAL ONCE
Status: CANCELLED | OUTPATIENT
Start: 2020-02-25

## 2020-02-21 RX ORDER — SODIUM CHLORIDE 9 MG/ML
20 INJECTION, SOLUTION INTRAVENOUS ONCE
Status: CANCELLED | OUTPATIENT
Start: 2020-02-25

## 2020-02-25 ENCOUNTER — HOSPITAL ENCOUNTER (OUTPATIENT)
Dept: INFUSION CENTER | Facility: CLINIC | Age: 65
Discharge: HOME | End: 2020-02-25
Payer: MEDICARE

## 2020-02-25 VITALS
HEART RATE: 91 BPM | HEIGHT: 73 IN | RESPIRATION RATE: 18 BRPM | TEMPERATURE: 97.7 F | DIASTOLIC BLOOD PRESSURE: 78 MMHG | WEIGHT: 241.62 LBS | BODY MASS INDEX: 32.02 KG/M2 | SYSTOLIC BLOOD PRESSURE: 165 MMHG

## 2020-02-25 DIAGNOSIS — C91.10 CLL (CHRONIC LYMPHOCYTIC LEUKEMIA) (HCC): ICD-10-CM

## 2020-02-25 DIAGNOSIS — D80.1 HYPOGAMMAGLOBULINEMIA, ACQUIRED (HCC): ICD-10-CM

## 2020-02-25 DIAGNOSIS — C91.10 CLL (CHRONIC LYMPHOCYTIC LEUKEMIA) (HCC): Primary | ICD-10-CM

## 2020-02-25 LAB
ALBUMIN SERPL BCP-MCNC: 4 G/DL (ref 3.5–5.7)
ALP SERPL-CCNC: 62 U/L (ref 46–116)
ALT SERPL W P-5'-P-CCNC: 56 U/L (ref 12–78)
ANION GAP SERPL CALCULATED.3IONS-SCNC: 10 MMOL/L (ref 4–13)
AST SERPL W P-5'-P-CCNC: 47 U/L (ref 5–45)
BASOPHILS # BLD MANUAL: 0 THOUSAND/UL (ref 0–0.1)
BASOPHILS NFR MAR MANUAL: 0 % (ref 0–1)
BILIRUB SERPL-MCNC: 0.8 MG/DL (ref 0.2–1)
BUN SERPL-MCNC: 18 MG/DL (ref 5–25)
CALCIUM SERPL-MCNC: 9.5 MG/DL (ref 8.3–10.1)
CHLORIDE SERPL-SCNC: 107 MMOL/L (ref 98–108)
CO2 SERPL-SCNC: 27 MMOL/L (ref 21–32)
CREAT SERPL-MCNC: 1.3 MG/DL (ref 0.6–1.3)
EOSINOPHIL # BLD MANUAL: 0.11 THOUSAND/UL (ref 0–0.4)
EOSINOPHIL NFR BLD MANUAL: 2 % (ref 0–6)
ERYTHROCYTE [DISTWIDTH] IN BLOOD BY AUTOMATED COUNT: 14.2 % (ref 11.6–15.1)
GFR SERPL CREATININE-BSD FRML MDRD: 57 ML/MIN/1.73SQ M
GLUCOSE SERPL-MCNC: 190 MG/DL (ref 65–140)
HCT VFR BLD AUTO: 41.5 % (ref 36.5–49.3)
HGB BLD-MCNC: 13.6 G/DL (ref 12–17)
HGB RETIC QN AUTO: 34.7 PG (ref 30–38.3)
IGG SERPL-MCNC: 538 MG/DL (ref 700–1600)
IMM RETICS NFR: 12 % (ref 0–14)
LDH SERPL-CCNC: 245 U/L (ref 81–234)
LYMPHOCYTES # BLD AUTO: 1.02 THOUSAND/UL (ref 0.6–4.47)
LYMPHOCYTES # BLD AUTO: 19 % (ref 14–44)
MCH RBC QN AUTO: 30.1 PG (ref 26.8–34.3)
MCHC RBC AUTO-ENTMCNC: 32.8 G/DL (ref 31.4–37.4)
MCV RBC AUTO: 92 FL (ref 82–98)
MONOCYTES # BLD AUTO: 0.22 THOUSAND/UL (ref 0–1.22)
MONOCYTES NFR BLD: 4 % (ref 4–12)
NEUTROPHILS # BLD MANUAL: 4.04 THOUSAND/UL (ref 1.85–7.62)
NEUTS BAND NFR BLD MANUAL: 2 % (ref 0–8)
NEUTS SEG NFR BLD AUTO: 73 % (ref 43–75)
PLATELET # BLD AUTO: 128 THOUSANDS/UL (ref 149–390)
PLATELET BLD QL SMEAR: ABNORMAL
PMV BLD AUTO: 11.5 FL (ref 8.9–12.7)
POTASSIUM SERPL-SCNC: 3.9 MMOL/L (ref 3.5–5.3)
PROT SERPL-MCNC: 6.4 G/DL (ref 6.4–8.2)
RBC # BLD AUTO: 4.52 MILLION/UL (ref 3.88–5.62)
RBC MORPH BLD: NORMAL
RETICS # AUTO: ABNORMAL 10*3/UL (ref 14356–105094)
RETICS # CALC: 2.25 % (ref 0.37–1.87)
SODIUM SERPL-SCNC: 144 MMOL/L (ref 136–145)
TOTAL CELLS COUNTED SPEC: 100
URATE SERPL-MCNC: 4.2 MG/DL (ref 4.2–8)
WBC # BLD AUTO: 5.38 THOUSAND/UL (ref 4.31–10.16)

## 2020-02-25 PROCEDURE — 85027 COMPLETE CBC AUTOMATED: CPT | Performed by: INTERNAL MEDICINE

## 2020-02-25 PROCEDURE — 85046 RETICYTE/HGB CONCENTRATE: CPT | Performed by: INTERNAL MEDICINE

## 2020-02-25 PROCEDURE — 96415 CHEMO IV INFUSION ADDL HR: CPT

## 2020-02-25 PROCEDURE — 96367 TX/PROPH/DG ADDL SEQ IV INF: CPT

## 2020-02-25 PROCEDURE — 83615 LACTATE (LD) (LDH) ENZYME: CPT | Performed by: INTERNAL MEDICINE

## 2020-02-25 PROCEDURE — 85007 BL SMEAR W/DIFF WBC COUNT: CPT | Performed by: INTERNAL MEDICINE

## 2020-02-25 PROCEDURE — 80053 COMPREHEN METABOLIC PANEL: CPT | Performed by: INTERNAL MEDICINE

## 2020-02-25 PROCEDURE — 84550 ASSAY OF BLOOD/URIC ACID: CPT | Performed by: INTERNAL MEDICINE

## 2020-02-25 PROCEDURE — 82232 ASSAY OF BETA-2 PROTEIN: CPT | Performed by: INTERNAL MEDICINE

## 2020-02-25 PROCEDURE — 82784 ASSAY IGA/IGD/IGG/IGM EACH: CPT | Performed by: INTERNAL MEDICINE

## 2020-02-25 PROCEDURE — 96413 CHEMO IV INFUSION 1 HR: CPT

## 2020-02-25 RX ORDER — ACETAMINOPHEN 325 MG/1
650 TABLET ORAL ONCE
Status: COMPLETED | OUTPATIENT
Start: 2020-02-25 | End: 2020-02-25

## 2020-02-25 RX ORDER — SODIUM CHLORIDE 9 MG/ML
20 INJECTION, SOLUTION INTRAVENOUS ONCE
Status: COMPLETED | OUTPATIENT
Start: 2020-02-25 | End: 2020-02-25

## 2020-02-25 RX ADMIN — DEXAMETHASONE SODIUM PHOSPHATE 20 MG: 10 INJECTION, SOLUTION INTRAMUSCULAR; INTRAVENOUS at 09:28

## 2020-02-25 RX ADMIN — OBINUTUZUMAB 1000 MG: 1000 INJECTION, SOLUTION, CONCENTRATE INTRAVENOUS at 10:30

## 2020-02-25 RX ADMIN — ACETAMINOPHEN 650 MG: 325 TABLET, FILM COATED ORAL at 09:26

## 2020-02-25 RX ADMIN — SODIUM CHLORIDE 20 ML/HR: 0.9 INJECTION, SOLUTION INTRAVENOUS at 09:27

## 2020-02-25 RX ADMIN — DIPHENHYDRAMINE HYDROCHLORIDE 25 MG: 50 INJECTION, SOLUTION INTRAMUSCULAR; INTRAVENOUS at 09:56

## 2020-02-27 LAB — B2 MICROGLOB SERPL-MCNC: 2.1 MG/L (ref 0.6–2.4)

## 2020-03-02 DIAGNOSIS — D59.9 ACQUIRED HEMOLYTIC ANEMIA (HCC): ICD-10-CM

## 2020-03-03 RX ORDER — PREDNISONE 10 MG/1
TABLET ORAL
Qty: 30 TABLET | Refills: 2 | OUTPATIENT
Start: 2020-03-03

## 2020-03-04 ENCOUNTER — OFFICE VISIT (OUTPATIENT)
Dept: HEMATOLOGY ONCOLOGY | Facility: CLINIC | Age: 65
End: 2020-03-04
Payer: MEDICARE

## 2020-03-04 VITALS
OXYGEN SATURATION: 97 % | DIASTOLIC BLOOD PRESSURE: 80 MMHG | RESPIRATION RATE: 16 BRPM | SYSTOLIC BLOOD PRESSURE: 150 MMHG | TEMPERATURE: 99 F | HEART RATE: 71 BPM | WEIGHT: 242 LBS | HEIGHT: 74 IN | BODY MASS INDEX: 31.06 KG/M2

## 2020-03-04 DIAGNOSIS — D75.82 HIT (HEPARIN-INDUCED THROMBOCYTOPENIA) (HCC): ICD-10-CM

## 2020-03-04 DIAGNOSIS — D59.19 OTHER AUTOIMMUNE HEMOLYTIC ANEMIAS: ICD-10-CM

## 2020-03-04 DIAGNOSIS — D80.1 HYPOGAMMAGLOBULINEMIA, ACQUIRED (HCC): ICD-10-CM

## 2020-03-04 DIAGNOSIS — D69.6 THROMBOCYTOPENIA (HCC): ICD-10-CM

## 2020-03-04 DIAGNOSIS — C91.10 CLL (CHRONIC LYMPHOCYTIC LEUKEMIA) (HCC): Primary | ICD-10-CM

## 2020-03-04 PROCEDURE — 99214 OFFICE O/P EST MOD 30 MIN: CPT | Performed by: INTERNAL MEDICINE

## 2020-03-04 PROCEDURE — 1036F TOBACCO NON-USER: CPT | Performed by: INTERNAL MEDICINE

## 2020-03-04 PROCEDURE — 4040F PNEUMOC VAC/ADMIN/RCVD: CPT | Performed by: INTERNAL MEDICINE

## 2020-03-04 PROCEDURE — 2022F DILAT RTA XM EVC RTNOPTHY: CPT | Performed by: INTERNAL MEDICINE

## 2020-03-04 NOTE — PROGRESS NOTES
HPI:  Follow-up visit for CLL with autoimmune hemolytic anemia, thrombocytopenia and hypogammaglobinemia and presently patient is on Gazyva 1000 mg once a month, ibrutinib 140 mg daily, prednisone 5 mg daily, folic acid 1 mg daily, and IVIG therapy   Treatments are working for him and he has been tolerating treatments without much problem   [facility] had been to several lines of treatments for CLL   History of chronic lung disease and he has cough and exertional dyspnea  Less wheezing  Has some tiredness  Vascular purpura  Arthritis   Nico Abdi is taking vitamin-D and calcium and tries to stay active to prevent worsening of 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    [facility] has coronary artery disease and has 1 stent and he takes 81 mg aspirin daily with food     History of heparin induced thrombocytopenia  CLL was diagnosed several years ago  He had been through Fludara based chemotherapy and pentostatin based chemotherapy  He had increased hemolysis when he was on chemotherapy  His most recent chemotherapy treatment was Cytoxan, Oncovin, prednisone and Rituxan and last treatment was in December 2012    In 2013 he was started on Rituxan, prednisone and folic acid because he started to [facility] again  IVIG was tried unsuccessfully so far as hemolysis is concerned  Rituxan has controlled the hemolysis    Information on the treatments from March 2017 onwards  On 3/20/17 patient found to have hemoglobin of 6 2, ,000  He was admitted to [facility] for blood transfusion and plan to transfer to [facility]  On 3/20/17 WBC count 195,000, hemoglobin 4 9, platelet count 65  Direct coomb's was positive with undetectable haptoglobin  He was given 2 units of PRBCs, Solu-Medrol 1 g ×3 days and IVIG 1 g/kg daily ×3 days  His hemoglobin continued to drop   Bone marrow biopsy on 3/21 showed marrow cellularity of greater than 95% almost replaced by small lymphocytes, lambda light chain restricted, CD20 positive, CD19 positive, CD23 positive partially expressing CD11c and CD25 and CD5 positive and negative for CD 79 and CD38  He was treated with single dose of chlorambucil to control his CLL   3/22 second dose of chlorambucil, also Rituxan 375 mg/M ² autoimmune hemolytic anemia  WBC continued to rise, 266,000  Patient initiated with Venetoclax 20 mg daily  Tumor lysis monitored every 8 hours; given aggressive hydration and Lasix and remained euvolemic   Later venetoclax was changed to ibrutinib because of disease progression  Dec 2017- Dimple Green decreased to 140 mg PO daily due to thrombocytopenia   Brynn Enamorado added     4/23 - 4/27/18 patient was admitted due to listeria bacteremia  He was discharged on IV ampicillin  Echo negative for vegetations   Dimple Green and Estefany Franks was on hold at that time          10/18/18 the patient EGD   This showed distal esophageal stricture   This was dilated to 18 mm using a balloon dilator   He could not dilate any further due to bleeding secondary to thrombocytopenia                        Current Outpatient Medications:     acyclovir (ZOVIRAX) 400 MG tablet, Take 1 tablet (400 mg total) by mouth 2 (two) times a day, Disp: 60 tablet, Rfl: 5    amLODIPine (NORVASC) 10 mg tablet, Take 1 tablet (10 mg total) by mouth daily, Disp: 90 tablet, Rfl: 3    aspirin 81 MG tablet, Take 81 mg by mouth every other day Every other day , Disp: , Rfl:     citalopram (CeleXA) 40 mg tablet, Take 1 tablet (40 mg total) by mouth daily, Disp: 90 tablet, Rfl: 1    fenofibrate (TRICOR) 145 mg tablet, Take 1 tablet (145 mg total) by mouth daily, Disp: 90 tablet, Rfl: 3    folic acid (FOLVITE) 1 mg tablet, Take 400 mcg by mouth daily , Disp: , Rfl:     furosemide (LASIX) 40 mg tablet, Take 1 tablet (40 mg total) by mouth daily, Disp: 90 tablet, Rfl: 3    gabapentin (NEURONTIN) 600 MG tablet, Take 1 tablet (600 mg total) by mouth daily (Patient not taking: Reported on 12/17/2019), Disp: 90 tablet, Rfl: 1    gabapentin (NEURONTIN) 600 MG tablet, TAKE 1 TABLET (600 MG TOTAL) BY MOUTH DAILY, Disp: 30 tablet, Rfl: 2    glucose monitoring kit (FREESTYLE) monitoring kit, 1 each by Does not apply route as needed ( ) E 11 9, Disp: 1 each, Rfl: 0    Ibrutinib 140 MG TABS, Take 140 mg by mouth daily, Disp: 30 tablet, Rfl: 11    insulin lispro (HUMALOG KWIKPEN) 100 units/mL injection pen, Use 5-10 units before meals as needed for steroid induced hyperglycemia, Disp: 5 pen, Rfl: 1    Insulin Syringe-Needle U-100 30G X 1/2" 0 3 ML MISC, by Does not apply route 2 (two) times a day, Disp: 100 each, Rfl: 6    Lancets (FREESTYLE) lancets, Use as instructed--test 2 times a day  E 11 9, Disp: 100 each, Rfl: 0    losartan (COZAAR) 100 MG tablet, Take 1 tablet (100 mg total) by mouth daily, Disp: 90 tablet, Rfl: 1    multivitamin (THERAGRAN) TABS, Take 1 tablet by mouth daily, Disp: , Rfl:     obinutuzumab (GAZYVA) 1000 mg/40 mL SOLN, Infuse into a venous catheter, Disp: , Rfl:     oxyCODONE (ROXICODONE) 10 MG TABS, Take 1 tablet (10 mg total) by mouth every 6 (six) hours as needed for moderate pain For ongoing pain controlMax Daily Amount: 40 mg, Disp: 90 tablet, Rfl: 0    pantoprazole (PROTONIX) 40 mg tablet, Take 1 tablet (40 mg total) by mouth daily, Disp: 90 tablet, Rfl: 3    Potassium (POTASSIMIN PO), Take 20 mEq by mouth daily, Disp: , Rfl:     potassium chloride (K-DUR,KLOR-CON) 20 mEq tablet, Take 1 tablet (20 mEq total) by mouth daily, Disp: 90 tablet, Rfl: 3    predniSONE 5 mg tablet, Take 1 tablet (5 mg total) by mouth daily, Disp: 90 tablet, Rfl: 2    sodium chloride, PF, 0 9 %, 10 mL by Intracatheter route see administration instructions 10mL into port before and after ampicillin doses, Disp: , Rfl: 0    VENTOLIN  (90 Base) MCG/ACT inhaler, Inhale 2 puffs 4 (four) times a day as needed for wheezing, Disp: 2 Inhaler, Rfl: 3    zolpidem (AMBIEN) 5 mg tablet, Take 1 tablet (5 mg total) by mouth daily at bedtime as needed for sleep, Disp: 30 tablet, Rfl: 0    Allergies   Allergen Reactions    Heparin Other (See Comments)     Other reaction(s): Blood Disorders  clot    Macrolides And Ketolides Other (See Comments)     Interacts with other meds he is taking          [facility] )    8/22/2017 -  Chemotherapy     [No matching medication found in this treatment plan]      4/24/2018 Initial Diagnosis     CLL (chronic lymphocytic leukemia) ([facility] )         ROS:  03/04/20 Reviewed 13 systems:  Presently no other neurological, cardiac, pulmonary, GI and  symptoms other than mentioned above  No other symptoms like  fever, chills, bleeding, bone pains, skin rash, weight loss,   weakness, numbness,  claudication and gait problem  Not unusually sensitive to heat or cold  No swelling of the ankles  No swollen glands  Patient is anxious  Other symptoms are in HPI        /80 (BP Location: Left arm)   Pulse 71   Temp 99 °F (37 2 °C) (Tympanic)   Resp 16   Ht 6' 1 5" (1 867 m)   Wt 110 kg (242 lb)   SpO2 97%   BMI 31 50 kg/m²     Physical Exam:  Alert, oriented, not in distress, no icterus, no oral thrush, no palpable neck mass, clear lung fields except for few fine scattered rhonchi, regular heart rate, abdomen  soft and non tender, no palpable abdominal mass, no ascites, no edema of ankles, no calf tenderness, no focal neurological deficit, no skin rash, has vascular purpura on forearms, no palpable lymphadenopathy, good arterial pulses, no clubbing  Patient is anxious  Performance status 1      IMAGING:      LABS:  Results for orders placed or performed during the hospital encounter of 02/25/20   Beta 2 microglobulin, serum   Result Value Ref Range    Beta-2 Microglobulin 2 1 0 6 - 2 4 mg/L   CBC and differential   Result Value Ref Range    WBC 5 38 4 31 - 10 16 Thousand/uL    RBC 4 52 3 88 - 5 62 Million/uL    Hemoglobin 13 6 12 0 - 17 0 g/dL Hematocrit 41 5 36 5 - 49 3 %    MCV 92 82 - 98 fL    MCH 30 1 26 8 - 34 3 pg    MCHC 32 8 31 4 - 37 4 g/dL    RDW 14 2 11 6 - 15 1 %    MPV 11 5 8 9 - 12 7 fL    Platelets 263 (L) 178 - 390 Thousands/uL   Comprehensive metabolic panel   Result Value Ref Range    Sodium 144 136 - 145 mmol/L    Potassium 3 9 3 5 - 5 3 mmol/L    Chloride 107 98 - 108 mmol/L    CO2 27 21 - 32 mmol/L    ANION GAP 10 4 - 13 mmol/L    BUN 18 5 - 25 mg/dL    Creatinine 1 30 0 60 - 1 30 mg/dL    Glucose 190 (H) 65 - 140 mg/dL    Calcium 9 5 8 3 - 10 1 mg/dL    AST 47 (H) 5 - 45 U/L    ALT 56 12 - 78 U/L    Alkaline Phosphatase 62 46 - 116 U/L    Total Protein 6 4 6 4 - 8 2 g/dL    Albumin 4 0 3 5 - 5 7 g/dL    Total Bilirubin 0 80 0 20 - 1 00 mg/dL    eGFR 57 ml/min/1 73sq m   IgG   Result Value Ref Range     0 (L) 700 0-1,600 0 mg/dL   LD,Blood   Result Value Ref Range     (H) 81 - 234 U/L   Reticulocyte Count with Retic HGB   Result Value Ref Range    Immature Retic Fract 12 0 0 0 - 14 0 %    Retic Ct Pct 2 25 (H) 0 37 - 1 87 %    Retic Ct Abs 101,000 14,356-105,094    RETIC HGB 34 7 30 0 - 38 3 pg   Uric acid   Result Value Ref Range    Uric Acid 4 2 4 2 - 8 0 mg/dL   Manual Differential(PHLEBS Do Not Order)   Result Value Ref Range    Segmented % 73 43 - 75 %    Bands % 2 0 - 8 %    Lymphocytes % 19 14 - 44 %    Monocytes % 4 4 - 12 %    Eosinophils, % 2 0 - 6 %    Basophils % 0 0 - 1 %    Absolute Neutrophils 4 04 1 85 - 7 62 Thousand/uL    Lymphocytes Absolute 1 02 0 60 - 4 47 Thousand/uL    Monocytes Absolute 0 22 0 00 - 1 22 Thousand/uL    Eosinophils Absolute 0 11 0 00 - 0 40 Thousand/uL    Basophils Absolute 0 00 0 00 - 0 10 Thousand/uL    Total Counted 100     RBC Morphology Normal     Platelet Estimate Borderline (A) Adequate     *Note: Due to a large number of results and/or encounters for the requested time period, some results have not been displayed  A complete set of results can be found in Results Review  Labs, Imaging, & Other studies:   All pertinent labs and imaging studies were personally reviewed    Lab Results   Component Value Date     12/29/2015    K 3 9 02/25/2020     02/25/2020    CO2 27 02/25/2020    ANIONGAP 8 12/29/2015    BUN 18 02/25/2020    CREATININE 1 30 02/25/2020    GLUCOSE 109 12/29/2015    GLUF 113 (H) 06/18/2019    CALCIUM 9 5 02/25/2020    AST 47 (H) 02/25/2020    ALT 56 02/25/2020    ALKPHOS 62 02/25/2020    PROT 5 9 (L) 12/29/2015    BILITOT 0 7 12/29/2015    EGFR 57 02/25/2020     Lab Results   Component Value Date    WBC 5 38 02/25/2020    HGB 13 6 02/25/2020    HCT 41 5 02/25/2020    MCV 92 02/25/2020     (L) 02/25/2020       Reviewed and discussed with patient  Assessment and plan: Follow-up visit for CLL with autoimmune hemolytic anemia, thrombocytopenia and hypogammaglobinemia and presently patient is on Gazyva 1000 mg once a month, ibrutinib 140 mg daily, prednisone 5 mg daily, folic acid 1 mg daily, and IVIG therapy   Treatments are working for him and he has been tolerating treatments without much problem   Rick Situ had been to several lines of treatments for CLL   History of chronic lung disease and he has cough and exertional dyspnea  Less wheezing  Has some tiredness  Vascular purpura  Arthritis   Audrey Mins is taking vitamin-D and calcium and tries to stay active to prevent worsening of 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    Rick Situ has coronary artery disease and has 1 stent and he takes 81 mg aspirin daily with food     History of heparin induced thrombocytopenia  CLL was diagnosed several years ago  He had been through Fludara based chemotherapy and pentostatin based chemotherapy  He had increased hemolysis when he was on chemotherapy  His most recent chemotherapy treatment was Cytoxan, Oncovin, prednisone and Rituxan and last treatment was in December 2012    In 2013 he was started on Rituxan, prednisone and folic acid because he started to hemolyse again  IVIG was tried unsuccessfully so far as hemolysis is concerned  Rituxan has controlled the hemolysis    Information on the treatments from March 2017 onwards  On 3/20/17 patient found to have hemoglobin of 6 2, ,000  He was admitted to [address] [address] for blood transfusion and plan to transfer to [address] [place] [address]  On 3/20/17 WBC count 195,000, hemoglobin 4 9, platelet count 65  Direct coomb's was positive with undetectable haptoglobin  He was given 2 units of PRBCs, Solu-Medrol 1 g ×3 days and IVIG 1 g/kg daily ×3 days  His hemoglobin continued to drop  Bone marrow biopsy on 3/21 showed marrow cellularity of greater than 95% almost replaced by small lymphocytes, lambda light chain restricted, CD20 positive, CD19 positive, CD23 positive partially expressing CD11c and CD25 and CD5 positive and negative for CD 79 and CD38  He was treated with single dose of chlorambucil to control his CLL   3/22 second dose of chlorambucil, also Rituxan 375 mg/M ² autoimmune hemolytic anemia  WBC continued to rise, 266,000  Patient initiated with Venetoclax 20 mg daily  Tumor lysis monitored every 8 hours; given aggressive hydration and Lasix and remained euvolemic   Later venetoclax was changed to ibrutinib because of disease progression  Dec 2017- Dez Forte decreased to 140 mg PO daily due to thrombocytopenia   Leidy Primus added     4/23 - 4/27/18 patient was admitted due to listeria bacteremia  He was discharged on IV ampicillin  Echo negative for vegetations   [facility] and [employer] was on hold at that time          10/18/18 the patient EGD   This showed distal esophageal stricture   This was dilated to 18 mm using a balloon dilator   He could not dilate any further due to bleeding secondary to thrombocytopenia               Physical examination and test results are as recorded and discussed   Patient is being continued on present doses of   Ibrutinib , gazyva, IVIG therapy, prednisone  and folic acid    Patient's condition, counts and chemistry are being monitored closely     Hematologically stable     Condition discussed and explained   Questions answered   Also discussed the importance of avoiding infections  and prompt treatment of infections   Goal is prolongation of survival   Patient is capable of self-care   Patient will continue to follow with his primary physician and other consultants     1  CLL (chronic lymphocytic leukemia) ([place] [address] )      2  Hypogammaglobulinemia, acquired ([place] [address] )      3  Other autoimmune hemolytic anemias ([place] [address] )      4  Thrombocytopenia ([place] [address] )      5  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) [facility])            Patient voiced understanding and agreement in the discussion  Counseling / Coordination of Care   Greater than 50% of total time was spent with the patient and / or family counseling and / or coordination of care

## 2020-03-10 ENCOUNTER — HOSPITAL ENCOUNTER (OUTPATIENT)
Dept: INFUSION CENTER | Facility: CLINIC | Age: 65
Discharge: HOME | End: 2020-03-10
Payer: MEDICARE

## 2020-03-10 VITALS
RESPIRATION RATE: 18 BRPM | TEMPERATURE: 98.5 F | HEART RATE: 69 BPM | DIASTOLIC BLOOD PRESSURE: 85 MMHG | SYSTOLIC BLOOD PRESSURE: 136 MMHG

## 2020-03-10 DIAGNOSIS — C91.10 CLL (CHRONIC LYMPHOCYTIC LEUKEMIA) (HCC): Primary | ICD-10-CM

## 2020-03-10 DIAGNOSIS — D80.1 HYPOGAMMAGLOBULINEMIA, ACQUIRED (HCC): ICD-10-CM

## 2020-03-10 PROCEDURE — 96367 TX/PROPH/DG ADDL SEQ IV INF: CPT

## 2020-03-10 PROCEDURE — 96366 THER/PROPH/DIAG IV INF ADDON: CPT

## 2020-03-10 PROCEDURE — 96365 THER/PROPH/DIAG IV INF INIT: CPT

## 2020-03-10 PROCEDURE — 96375 TX/PRO/DX INJ NEW DRUG ADDON: CPT

## 2020-03-10 RX ORDER — SODIUM CHLORIDE 9 MG/ML
20 INJECTION, SOLUTION INTRAVENOUS ONCE
Status: COMPLETED | OUTPATIENT
Start: 2020-03-10 | End: 2020-03-10

## 2020-03-10 RX ORDER — SODIUM CHLORIDE 9 MG/ML
20 INJECTION, SOLUTION INTRAVENOUS ONCE
Status: CANCELLED | OUTPATIENT
Start: 2020-04-07

## 2020-03-10 RX ORDER — ACETAMINOPHEN 325 MG/1
650 TABLET ORAL ONCE
Status: CANCELLED | OUTPATIENT
Start: 2020-04-07

## 2020-03-10 RX ORDER — ACETAMINOPHEN 325 MG/1
650 TABLET ORAL ONCE
Status: COMPLETED | OUTPATIENT
Start: 2020-03-10 | End: 2020-03-10

## 2020-03-10 RX ADMIN — DIPHENHYDRAMINE HYDROCHLORIDE 12.5 MG: 50 INJECTION, SOLUTION INTRAMUSCULAR; INTRAVENOUS at 08:45

## 2020-03-10 RX ADMIN — HYDROCORTISONE SODIUM SUCCINATE 100 MG: 100 INJECTION, POWDER, FOR SOLUTION INTRAMUSCULAR; INTRAVENOUS at 08:43

## 2020-03-10 RX ADMIN — Medication 22.5 G: at 09:16

## 2020-03-10 RX ADMIN — ACETAMINOPHEN 650 MG: 325 TABLET, FILM COATED ORAL at 08:43

## 2020-03-10 RX ADMIN — SODIUM CHLORIDE 20 ML/HR: 0.9 INJECTION, SOLUTION INTRAVENOUS at 08:36

## 2020-03-10 NOTE — PROGRESS NOTES
Pt  Denies new symptoms or concerns today  No labs obtained today  Labs are ordered q 4 weeks and will be due with next infusion  Pt  Tolerated IVIG without adverse event after receiving pre-meds as ordered  Future appointments reviewed   Declined AVS

## 2020-03-10 NOTE — PLAN OF CARE
Problem: PAIN - ADULT  Goal: Verbalizes/displays adequate comfort level or baseline comfort level  Description  Interventions:  - Encourage patient to monitor pain and request assistance  - Assess pain using appropriate pain scale  - Administer analgesics based on type and severity of pain and evaluate response  - Implement non-pharmacological measures as appropriate and evaluate response  - Consider cultural and social influences on pain and pain management  - Notify physician/advanced practitioner if interventions unsuccessful or patient reports new pain  Outcome: Progressing     Problem: INFECTION - ADULT  Goal: Absence or prevention of progression during hospitalization  Description  INTERVENTIONS:  - Assess and monitor for signs and symptoms of infection  - Monitor lab/diagnostic results  - Monitor all insertion sites, i e  indwelling lines, tubes, and drains  - Monitor endotracheal if appropriate and nasal secretions for changes in amount and color  - [place] appropriate cooling/warming therapies per order  - Administer medications as ordered  - Instruct and encourage patient and family to use good hand hygiene technique  - Identify and instruct in appropriate isolation precautions for identified infection/condition  Outcome: Progressing  Goal: Absence of fever/infection during neutropenic period  Description  INTERVENTIONS:  - Monitor WBC    Outcome: Progressing     Problem: Knowledge Deficit  Goal: Patient/family/caregiver demonstrates understanding of disease process, treatment plan, medications, and discharge instructions  Description  Complete learning assessment and assess knowledge base    Interventions:  - Provide teaching at level of understanding  - Provide teaching via preferred learning methods  Outcome: Progressing

## 2020-03-11 ENCOUNTER — TELEPHONE (OUTPATIENT)
Dept: HEMATOLOGY ONCOLOGY | Facility: CLINIC | Age: 65
End: 2020-03-11

## 2020-03-11 DIAGNOSIS — E78.2 MIXED HYPERLIPIDEMIA: ICD-10-CM

## 2020-03-11 DIAGNOSIS — C91.10 CLL (CHRONIC LYMPHOCYTIC LEUKEMIA) (HCC): ICD-10-CM

## 2020-03-11 DIAGNOSIS — Z00.00 PREVENTATIVE HEALTH CARE: Primary | ICD-10-CM

## 2020-03-11 RX ORDER — FENOFIBRATE 145 MG/1
TABLET, COATED ORAL
Qty: 90 TABLET | Refills: 5 | Status: SHIPPED | OUTPATIENT
Start: 2020-03-11 | End: 2021-05-02

## 2020-03-11 RX ORDER — PREDNISONE 1 MG/1
5 TABLET ORAL DAILY
Qty: 90 TABLET | Refills: 2 | Status: SHIPPED | OUTPATIENT
Start: 2020-03-11 | End: 2020-10-21 | Stop reason: SDUPTHER

## 2020-03-11 RX ORDER — ACYCLOVIR 400 MG/1
400 TABLET ORAL 2 TIMES DAILY
Qty: 60 TABLET | Refills: 5 | Status: SHIPPED | OUTPATIENT
Start: 2020-03-11 | End: 2021-02-19

## 2020-03-11 NOTE — TELEPHONE ENCOUNTER
Task received,  Patient asking for refill on Acyclovir 400 mg  Medication pended to Dr Tej Capps for refill  Please send to [facility]  Call placed to patient and made aware of same

## 2020-03-11 NOTE — TELEPHONE ENCOUNTER
Patient called requesting a refill on: acyclovir (ZOVIRAX) 400 MG tablet  Patient has 7-8 days of  pills left      Preferred pharmacy:   [facility]      Patient's call back # [phone]

## 2020-03-19 RX ORDER — ACETAMINOPHEN 325 MG/1
650 TABLET ORAL ONCE
Status: CANCELLED | OUTPATIENT
Start: 2020-03-24

## 2020-03-19 RX ORDER — SODIUM CHLORIDE 9 MG/ML
20 INJECTION, SOLUTION INTRAVENOUS ONCE
Status: CANCELLED | OUTPATIENT
Start: 2020-03-24

## 2020-03-23 DIAGNOSIS — E09.9 STEROID-INDUCED DIABETES (HCC): Primary | ICD-10-CM

## 2020-03-23 DIAGNOSIS — T38.0X5A STEROID-INDUCED DIABETES (HCC): Primary | ICD-10-CM

## 2020-03-23 RX ORDER — INSULIN LISPRO 100 [IU]/ML
INJECTION, SOLUTION INTRAVENOUS; SUBCUTANEOUS
Qty: 15 ML | Refills: 1 | Status: SHIPPED | OUTPATIENT
Start: 2020-03-23 | End: 2021-01-20 | Stop reason: SDUPTHER

## 2020-03-24 ENCOUNTER — HOSPITAL ENCOUNTER (OUTPATIENT)
Dept: INFUSION CENTER | Facility: CLINIC | Age: 65
Discharge: HOME | End: 2020-03-24
Payer: MEDICARE

## 2020-03-24 VITALS
HEART RATE: 77 BPM | RESPIRATION RATE: 16 BRPM | DIASTOLIC BLOOD PRESSURE: 74 MMHG | HEIGHT: 72 IN | WEIGHT: 239.2 LBS | SYSTOLIC BLOOD PRESSURE: 169 MMHG | BODY MASS INDEX: 32.4 KG/M2 | TEMPERATURE: 99.2 F

## 2020-03-24 DIAGNOSIS — D80.1 HYPOGAMMAGLOBULINEMIA, ACQUIRED (HCC): ICD-10-CM

## 2020-03-24 DIAGNOSIS — C91.10 CLL (CHRONIC LYMPHOCYTIC LEUKEMIA) (HCC): Primary | ICD-10-CM

## 2020-03-24 LAB
ALBUMIN SERPL BCP-MCNC: 3.8 G/DL (ref 3.5–5.7)
ALP SERPL-CCNC: 55 U/L (ref 46–116)
ALT SERPL W P-5'-P-CCNC: 66 U/L (ref 12–78)
ANION GAP SERPL CALCULATED.3IONS-SCNC: 11 MMOL/L (ref 4–13)
AST SERPL W P-5'-P-CCNC: 44 U/L (ref 5–45)
BASOPHILS # BLD MANUAL: 0.13 THOUSAND/UL (ref 0–0.1)
BASOPHILS NFR MAR MANUAL: 3 % (ref 0–1)
BILIRUB SERPL-MCNC: 0.8 MG/DL (ref 0.2–1)
BUN SERPL-MCNC: 25 MG/DL (ref 5–25)
CALCIUM SERPL-MCNC: 9.8 MG/DL (ref 8.3–10.1)
CHLORIDE SERPL-SCNC: 105 MMOL/L (ref 98–108)
CO2 SERPL-SCNC: 25 MMOL/L (ref 21–32)
CREAT SERPL-MCNC: 1 MG/DL (ref 0.6–1.3)
EOSINOPHIL # BLD MANUAL: 0.22 THOUSAND/UL (ref 0–0.4)
EOSINOPHIL NFR BLD MANUAL: 5 % (ref 0–6)
ERYTHROCYTE [DISTWIDTH] IN BLOOD BY AUTOMATED COUNT: 14 % (ref 11.6–15.1)
GFR SERPL CREATININE-BSD FRML MDRD: 79 ML/MIN/1.73SQ M
GLUCOSE SERPL-MCNC: 246 MG/DL (ref 65–140)
HCT VFR BLD AUTO: 43.2 % (ref 36.5–49.3)
HGB BLD-MCNC: 14.4 G/DL (ref 12–17)
HGB RETIC QN AUTO: 35.9 PG (ref 30–38.3)
IGG SERPL-MCNC: 555 MG/DL (ref 700–1600)
IMM RETICS NFR: 13.1 % (ref 0–14)
LDH SERPL-CCNC: 244 U/L (ref 81–234)
LYMPHOCYTES # BLD AUTO: 0.61 THOUSAND/UL (ref 0.6–4.47)
LYMPHOCYTES # BLD AUTO: 14 % (ref 14–44)
MCH RBC QN AUTO: 30.2 PG (ref 26.8–34.3)
MCHC RBC AUTO-ENTMCNC: 33.3 G/DL (ref 31.4–37.4)
MCV RBC AUTO: 91 FL (ref 82–98)
MONOCYTES # BLD AUTO: 0.39 THOUSAND/UL (ref 0–1.22)
MONOCYTES NFR BLD: 9 % (ref 4–12)
NEUTROPHILS # BLD MANUAL: 3.02 THOUSAND/UL (ref 1.85–7.62)
NEUTS BAND NFR BLD MANUAL: 1 % (ref 0–8)
NEUTS SEG NFR BLD AUTO: 68 % (ref 43–75)
PLATELET # BLD AUTO: 127 THOUSANDS/UL (ref 149–390)
PLATELET BLD QL SMEAR: ABNORMAL
PMV BLD AUTO: 11.5 FL (ref 8.9–12.7)
POTASSIUM SERPL-SCNC: 3.7 MMOL/L (ref 3.5–5.3)
PROT SERPL-MCNC: 6.1 G/DL (ref 6.4–8.2)
RBC # BLD AUTO: 4.77 MILLION/UL (ref 3.88–5.62)
RBC MORPH BLD: NORMAL
RETICS # AUTO: ABNORMAL 10*3/UL (ref 14356–105094)
RETICS # CALC: 2.45 % (ref 0.37–1.87)
SODIUM SERPL-SCNC: 141 MMOL/L (ref 136–145)
TOTAL CELLS COUNTED SPEC: 100
URATE SERPL-MCNC: 4.3 MG/DL (ref 4.2–8)
WBC # BLD AUTO: 4.38 THOUSAND/UL (ref 4.31–10.16)

## 2020-03-24 PROCEDURE — 85046 RETICYTE/HGB CONCENTRATE: CPT | Performed by: INTERNAL MEDICINE

## 2020-03-24 PROCEDURE — 85007 BL SMEAR W/DIFF WBC COUNT: CPT | Performed by: INTERNAL MEDICINE

## 2020-03-24 PROCEDURE — 85027 COMPLETE CBC AUTOMATED: CPT | Performed by: INTERNAL MEDICINE

## 2020-03-24 PROCEDURE — 96415 CHEMO IV INFUSION ADDL HR: CPT

## 2020-03-24 PROCEDURE — 96367 TX/PROPH/DG ADDL SEQ IV INF: CPT

## 2020-03-24 PROCEDURE — 84550 ASSAY OF BLOOD/URIC ACID: CPT | Performed by: INTERNAL MEDICINE

## 2020-03-24 PROCEDURE — 96413 CHEMO IV INFUSION 1 HR: CPT

## 2020-03-24 PROCEDURE — 83615 LACTATE (LD) (LDH) ENZYME: CPT | Performed by: INTERNAL MEDICINE

## 2020-03-24 PROCEDURE — 80053 COMPREHEN METABOLIC PANEL: CPT | Performed by: INTERNAL MEDICINE

## 2020-03-24 PROCEDURE — 82232 ASSAY OF BETA-2 PROTEIN: CPT | Performed by: INTERNAL MEDICINE

## 2020-03-24 PROCEDURE — 82784 ASSAY IGA/IGD/IGG/IGM EACH: CPT | Performed by: INTERNAL MEDICINE

## 2020-03-24 RX ORDER — ACETAMINOPHEN 325 MG/1
650 TABLET ORAL ONCE
Status: COMPLETED | OUTPATIENT
Start: 2020-03-24 | End: 2020-03-24

## 2020-03-24 RX ORDER — SODIUM CHLORIDE 9 MG/ML
20 INJECTION, SOLUTION INTRAVENOUS ONCE
Status: COMPLETED | OUTPATIENT
Start: 2020-03-24 | End: 2020-03-24

## 2020-03-24 RX ADMIN — SODIUM CHLORIDE 20 ML/HR: 9 INJECTION, SOLUTION INTRAVENOUS at 08:40

## 2020-03-24 RX ADMIN — ACETAMINOPHEN 650 MG: 325 TABLET, FILM COATED ORAL at 09:13

## 2020-03-24 RX ADMIN — OBINUTUZUMAB 1000 MG: 1000 INJECTION, SOLUTION, CONCENTRATE INTRAVENOUS at 10:08

## 2020-03-24 RX ADMIN — DIPHENHYDRAMINE HYDROCHLORIDE 25 MG: 50 INJECTION, SOLUTION INTRAMUSCULAR; INTRAVENOUS at 09:36

## 2020-03-24 RX ADMIN — DEXAMETHASONE SODIUM PHOSPHATE 20 MG: 10 INJECTION, SOLUTION INTRAMUSCULAR; INTRAVENOUS at 09:16

## 2020-03-25 LAB — B2 MICROGLOB SERPL-MCNC: 2.4 MG/L (ref 0.6–2.4)

## 2020-04-07 ENCOUNTER — HOSPITAL ENCOUNTER (OUTPATIENT)
Dept: INFUSION CENTER | Facility: CLINIC | Age: 65
Discharge: HOME | End: 2020-04-07
Payer: MEDICARE

## 2020-04-07 VITALS
HEART RATE: 73 BPM | WEIGHT: 240.3 LBS | SYSTOLIC BLOOD PRESSURE: 158 MMHG | BODY MASS INDEX: 32.58 KG/M2 | DIASTOLIC BLOOD PRESSURE: 88 MMHG | RESPIRATION RATE: 16 BRPM | TEMPERATURE: 98.8 F

## 2020-04-07 DIAGNOSIS — C91.10 CLL (CHRONIC LYMPHOCYTIC LEUKEMIA) (HCC): Primary | ICD-10-CM

## 2020-04-07 DIAGNOSIS — D80.1 HYPOGAMMAGLOBULINEMIA, ACQUIRED (HCC): ICD-10-CM

## 2020-04-07 PROCEDURE — 96365 THER/PROPH/DIAG IV INF INIT: CPT

## 2020-04-07 PROCEDURE — 96367 TX/PROPH/DG ADDL SEQ IV INF: CPT

## 2020-04-07 PROCEDURE — 96375 TX/PRO/DX INJ NEW DRUG ADDON: CPT

## 2020-04-07 PROCEDURE — 96366 THER/PROPH/DIAG IV INF ADDON: CPT

## 2020-04-07 RX ORDER — ACETAMINOPHEN 325 MG/1
650 TABLET ORAL ONCE
Status: CANCELLED | OUTPATIENT
Start: 2020-05-05

## 2020-04-07 RX ORDER — ACETAMINOPHEN 325 MG/1
650 TABLET ORAL ONCE
Status: COMPLETED | OUTPATIENT
Start: 2020-04-07 | End: 2020-04-07

## 2020-04-07 RX ORDER — SODIUM CHLORIDE 9 MG/ML
20 INJECTION, SOLUTION INTRAVENOUS ONCE
Status: CANCELLED | OUTPATIENT
Start: 2020-05-05

## 2020-04-07 RX ORDER — SODIUM CHLORIDE 9 MG/ML
20 INJECTION, SOLUTION INTRAVENOUS ONCE
Status: COMPLETED | OUTPATIENT
Start: 2020-04-07 | End: 2020-04-07

## 2020-04-07 RX ADMIN — DIPHENHYDRAMINE HYDROCHLORIDE 12.5 MG: 50 INJECTION, SOLUTION INTRAMUSCULAR; INTRAVENOUS at 08:33

## 2020-04-07 RX ADMIN — SODIUM CHLORIDE 20 ML/HR: 0.9 INJECTION, SOLUTION INTRAVENOUS at 08:31

## 2020-04-07 RX ADMIN — Medication 22.5 G: at 09:15

## 2020-04-07 RX ADMIN — ACETAMINOPHEN 650 MG: 325 TABLET, FILM COATED ORAL at 08:36

## 2020-04-07 RX ADMIN — HYDROCORTISONE SODIUM SUCCINATE 100 MG: 100 INJECTION, POWDER, FOR SOLUTION INTRAMUSCULAR; INTRAVENOUS at 08:53

## 2020-04-16 RX ORDER — SODIUM CHLORIDE 9 MG/ML
20 INJECTION, SOLUTION INTRAVENOUS ONCE
Status: CANCELLED | OUTPATIENT
Start: 2020-04-21

## 2020-04-16 RX ORDER — ACETAMINOPHEN 325 MG/1
650 TABLET ORAL ONCE
Status: CANCELLED | OUTPATIENT
Start: 2020-04-21

## 2020-04-21 ENCOUNTER — HOSPITAL ENCOUNTER (OUTPATIENT)
Dept: INFUSION CENTER | Facility: CLINIC | Age: 65
Discharge: HOME | End: 2020-04-21
Payer: MEDICARE

## 2020-04-21 VITALS
TEMPERATURE: 98.3 F | DIASTOLIC BLOOD PRESSURE: 83 MMHG | BODY MASS INDEX: 32.28 KG/M2 | HEART RATE: 70 BPM | SYSTOLIC BLOOD PRESSURE: 169 MMHG | RESPIRATION RATE: 18 BRPM | WEIGHT: 238.1 LBS

## 2020-04-21 DIAGNOSIS — D80.1 HYPOGAMMAGLOBULINEMIA, ACQUIRED (HCC): ICD-10-CM

## 2020-04-21 DIAGNOSIS — C91.10 CLL (CHRONIC LYMPHOCYTIC LEUKEMIA) (HCC): Primary | ICD-10-CM

## 2020-04-21 LAB
ALBUMIN SERPL BCP-MCNC: 4 G/DL (ref 3.5–5.7)
ALP SERPL-CCNC: 56 U/L (ref 46–116)
ALT SERPL W P-5'-P-CCNC: 44 U/L (ref 12–78)
ANION GAP SERPL CALCULATED.3IONS-SCNC: 10 MMOL/L (ref 4–13)
ANISOCYTOSIS BLD QL SMEAR: PRESENT
AST SERPL W P-5'-P-CCNC: 36 U/L (ref 5–45)
BASOPHILS # BLD MANUAL: 0 THOUSAND/UL (ref 0–0.1)
BASOPHILS NFR MAR MANUAL: 0 % (ref 0–1)
BILIRUB SERPL-MCNC: 0.8 MG/DL (ref 0.2–1)
BUN SERPL-MCNC: 23 MG/DL (ref 5–25)
CALCIUM SERPL-MCNC: 9.7 MG/DL (ref 8.3–10.1)
CHLORIDE SERPL-SCNC: 103 MMOL/L (ref 98–108)
CO2 SERPL-SCNC: 28 MMOL/L (ref 21–32)
CREAT SERPL-MCNC: 0.9 MG/DL (ref 0.6–1.3)
EOSINOPHIL # BLD MANUAL: 0.09 THOUSAND/UL (ref 0–0.4)
EOSINOPHIL NFR BLD MANUAL: 2 % (ref 0–6)
ERYTHROCYTE [DISTWIDTH] IN BLOOD BY AUTOMATED COUNT: 13.9 % (ref 11.6–15.1)
GFR SERPL CREATININE-BSD FRML MDRD: 89 ML/MIN/1.73SQ M
GLUCOSE SERPL-MCNC: 160 MG/DL (ref 65–140)
HCT VFR BLD AUTO: 42.4 % (ref 36.5–49.3)
HGB BLD-MCNC: 14.1 G/DL (ref 12–17)
HGB RETIC QN AUTO: 33.8 PG (ref 30–38.3)
IGG SERPL-MCNC: 552 MG/DL (ref 700–1600)
IMM RETICS NFR: 8.4 % (ref 0–14)
LDH SERPL-CCNC: 232 U/L (ref 81–234)
LYMPHOCYTES # BLD AUTO: 0.74 THOUSAND/UL (ref 0.6–4.47)
LYMPHOCYTES # BLD AUTO: 17 % (ref 14–44)
MCH RBC QN AUTO: 29.9 PG (ref 26.8–34.3)
MCHC RBC AUTO-ENTMCNC: 33.3 G/DL (ref 31.4–37.4)
MCV RBC AUTO: 90 FL (ref 82–98)
MONOCYTES # BLD AUTO: 0.52 THOUSAND/UL (ref 0–1.22)
MONOCYTES NFR BLD: 12 % (ref 4–12)
NEUTROPHILS # BLD MANUAL: 2.99 THOUSAND/UL (ref 1.85–7.62)
NEUTS BAND NFR BLD MANUAL: 2 % (ref 0–8)
NEUTS SEG NFR BLD AUTO: 67 % (ref 43–75)
PLATELET # BLD AUTO: 134 THOUSANDS/UL (ref 149–390)
PLATELET BLD QL SMEAR: ABNORMAL
PMV BLD AUTO: 11.5 FL (ref 8.9–12.7)
POTASSIUM SERPL-SCNC: 3.7 MMOL/L (ref 3.5–5.3)
PROT SERPL-MCNC: 6.3 G/DL (ref 6.4–8.2)
RBC # BLD AUTO: 4.72 MILLION/UL (ref 3.88–5.62)
RETICS # AUTO: ABNORMAL 10*3/UL (ref 14356–105094)
RETICS # CALC: 2.07 % (ref 0.37–1.87)
SODIUM SERPL-SCNC: 141 MMOL/L (ref 136–145)
TOTAL CELLS COUNTED SPEC: 100
URATE SERPL-MCNC: 3.7 MG/DL (ref 4.2–8)
WBC # BLD AUTO: 4.34 THOUSAND/UL (ref 4.31–10.16)

## 2020-04-21 PROCEDURE — 85046 RETICYTE/HGB CONCENTRATE: CPT | Performed by: INTERNAL MEDICINE

## 2020-04-21 PROCEDURE — 96367 TX/PROPH/DG ADDL SEQ IV INF: CPT

## 2020-04-21 PROCEDURE — 80053 COMPREHEN METABOLIC PANEL: CPT | Performed by: INTERNAL MEDICINE

## 2020-04-21 PROCEDURE — 83615 LACTATE (LD) (LDH) ENZYME: CPT | Performed by: INTERNAL MEDICINE

## 2020-04-21 PROCEDURE — 85027 COMPLETE CBC AUTOMATED: CPT | Performed by: INTERNAL MEDICINE

## 2020-04-21 PROCEDURE — 82784 ASSAY IGA/IGD/IGG/IGM EACH: CPT | Performed by: INTERNAL MEDICINE

## 2020-04-21 PROCEDURE — 84550 ASSAY OF BLOOD/URIC ACID: CPT | Performed by: INTERNAL MEDICINE

## 2020-04-21 PROCEDURE — 96415 CHEMO IV INFUSION ADDL HR: CPT

## 2020-04-21 PROCEDURE — 85007 BL SMEAR W/DIFF WBC COUNT: CPT | Performed by: INTERNAL MEDICINE

## 2020-04-21 PROCEDURE — 96413 CHEMO IV INFUSION 1 HR: CPT

## 2020-04-21 PROCEDURE — 82232 ASSAY OF BETA-2 PROTEIN: CPT | Performed by: INTERNAL MEDICINE

## 2020-04-21 RX ORDER — ACETAMINOPHEN 325 MG/1
650 TABLET ORAL ONCE
Status: COMPLETED | OUTPATIENT
Start: 2020-04-21 | End: 2020-04-21

## 2020-04-21 RX ORDER — SODIUM CHLORIDE 9 MG/ML
20 INJECTION, SOLUTION INTRAVENOUS ONCE
Status: COMPLETED | OUTPATIENT
Start: 2020-04-21 | End: 2020-04-21

## 2020-04-21 RX ADMIN — ACETAMINOPHEN 650 MG: 325 TABLET, FILM COATED ORAL at 08:45

## 2020-04-21 RX ADMIN — DIPHENHYDRAMINE HYDROCHLORIDE 25 MG: 50 INJECTION, SOLUTION INTRAMUSCULAR; INTRAVENOUS at 09:15

## 2020-04-21 RX ADMIN — DEXAMETHASONE SODIUM PHOSPHATE 20 MG: 10 INJECTION, SOLUTION INTRAMUSCULAR; INTRAVENOUS at 08:55

## 2020-04-21 RX ADMIN — OBINUTUZUMAB 1000 MG: 1000 INJECTION, SOLUTION, CONCENTRATE INTRAVENOUS at 10:18

## 2020-04-21 RX ADMIN — SODIUM CHLORIDE 20 ML/HR: 0.9 INJECTION, SOLUTION INTRAVENOUS at 08:50

## 2020-04-22 LAB — B2 MICROGLOB SERPL-MCNC: 2 MG/L (ref 0.6–2.4)

## 2020-05-05 ENCOUNTER — HOSPITAL ENCOUNTER (OUTPATIENT)
Dept: INFUSION CENTER | Facility: CLINIC | Age: 65
Discharge: HOME | End: 2020-05-05
Payer: MEDICARE

## 2020-05-05 ENCOUNTER — OFFICE VISIT (OUTPATIENT)
Dept: HEMATOLOGY ONCOLOGY | Facility: CLINIC | Age: 65
End: 2020-05-05
Payer: MEDICARE

## 2020-05-05 VITALS
HEIGHT: 72 IN | DIASTOLIC BLOOD PRESSURE: 84 MMHG | OXYGEN SATURATION: 98 % | WEIGHT: 241.2 LBS | RESPIRATION RATE: 18 BRPM | HEART RATE: 68 BPM | BODY MASS INDEX: 32.67 KG/M2 | TEMPERATURE: 97.8 F | SYSTOLIC BLOOD PRESSURE: 134 MMHG

## 2020-05-05 VITALS
TEMPERATURE: 98.1 F | SYSTOLIC BLOOD PRESSURE: 146 MMHG | HEART RATE: 67 BPM | RESPIRATION RATE: 18 BRPM | DIASTOLIC BLOOD PRESSURE: 83 MMHG

## 2020-05-05 DIAGNOSIS — D69.6 THROMBOCYTOPENIA (HCC): ICD-10-CM

## 2020-05-05 DIAGNOSIS — D75.82 HIT (HEPARIN-INDUCED THROMBOCYTOPENIA) (HCC): ICD-10-CM

## 2020-05-05 DIAGNOSIS — D80.1 HYPOGAMMAGLOBULINEMIA, ACQUIRED (HCC): ICD-10-CM

## 2020-05-05 DIAGNOSIS — C91.10 CLL (CHRONIC LYMPHOCYTIC LEUKEMIA) (HCC): Primary | ICD-10-CM

## 2020-05-05 DIAGNOSIS — D59.19 OTHER AUTOIMMUNE HEMOLYTIC ANEMIAS: ICD-10-CM

## 2020-05-05 PROCEDURE — 1036F TOBACCO NON-USER: CPT | Performed by: INTERNAL MEDICINE

## 2020-05-05 PROCEDURE — 3008F BODY MASS INDEX DOCD: CPT | Performed by: INTERNAL MEDICINE

## 2020-05-05 PROCEDURE — 96375 TX/PRO/DX INJ NEW DRUG ADDON: CPT

## 2020-05-05 PROCEDURE — 96365 THER/PROPH/DIAG IV INF INIT: CPT

## 2020-05-05 PROCEDURE — 1160F RVW MEDS BY RX/DR IN RCRD: CPT | Performed by: INTERNAL MEDICINE

## 2020-05-05 PROCEDURE — 99214 OFFICE O/P EST MOD 30 MIN: CPT | Performed by: INTERNAL MEDICINE

## 2020-05-05 PROCEDURE — 4040F PNEUMOC VAC/ADMIN/RCVD: CPT | Performed by: INTERNAL MEDICINE

## 2020-05-05 PROCEDURE — 2022F DILAT RTA XM EVC RTNOPTHY: CPT | Performed by: INTERNAL MEDICINE

## 2020-05-05 PROCEDURE — 96367 TX/PROPH/DG ADDL SEQ IV INF: CPT

## 2020-05-05 RX ORDER — ACETAMINOPHEN 325 MG/1
650 TABLET ORAL ONCE
Status: CANCELLED | OUTPATIENT
Start: 2020-06-02

## 2020-05-05 RX ORDER — SODIUM CHLORIDE 9 MG/ML
20 INJECTION, SOLUTION INTRAVENOUS ONCE
Status: CANCELLED | OUTPATIENT
Start: 2020-06-02

## 2020-05-05 RX ORDER — ACETAMINOPHEN 325 MG/1
650 TABLET ORAL ONCE
Status: COMPLETED | OUTPATIENT
Start: 2020-05-05 | End: 2020-05-05

## 2020-05-05 RX ORDER — SODIUM CHLORIDE 9 MG/ML
20 INJECTION, SOLUTION INTRAVENOUS ONCE
Status: COMPLETED | OUTPATIENT
Start: 2020-05-05 | End: 2020-05-05

## 2020-05-05 RX ADMIN — DIPHENHYDRAMINE HYDROCHLORIDE 12.5 MG: 50 INJECTION, SOLUTION INTRAMUSCULAR; INTRAVENOUS at 09:25

## 2020-05-05 RX ADMIN — Medication 22.5 G: at 10:06

## 2020-05-05 RX ADMIN — HYDROCORTISONE SODIUM SUCCINATE 100 MG: 100 INJECTION, POWDER, FOR SOLUTION INTRAMUSCULAR; INTRAVENOUS at 09:31

## 2020-05-05 RX ADMIN — ACETAMINOPHEN 650 MG: 325 TABLET ORAL at 09:30

## 2020-05-05 RX ADMIN — SODIUM CHLORIDE 20 ML/HR: 0.9 INJECTION, SOLUTION INTRAVENOUS at 09:25

## 2020-05-06 DIAGNOSIS — G89.3 CANCER RELATED PAIN: ICD-10-CM

## 2020-05-06 RX ORDER — OXYCODONE HYDROCHLORIDE 10 MG/1
10 TABLET ORAL EVERY 6 HOURS PRN
Qty: 90 TABLET | Refills: 0 | Status: SHIPPED | OUTPATIENT
Start: 2020-05-06 | End: 2021-04-21 | Stop reason: SDUPTHER

## 2020-05-14 RX ORDER — SODIUM CHLORIDE 9 MG/ML
20 INJECTION, SOLUTION INTRAVENOUS ONCE
Status: CANCELLED | OUTPATIENT
Start: 2020-05-19

## 2020-05-14 RX ORDER — ACETAMINOPHEN 325 MG/1
650 TABLET ORAL ONCE
Status: CANCELLED | OUTPATIENT
Start: 2020-05-20

## 2020-05-19 ENCOUNTER — HOSPITAL ENCOUNTER (OUTPATIENT)
Dept: INFUSION CENTER | Facility: CLINIC | Age: 65
Discharge: HOME | End: 2020-05-19
Payer: MEDICARE

## 2020-05-19 VITALS
WEIGHT: 242.51 LBS | RESPIRATION RATE: 18 BRPM | SYSTOLIC BLOOD PRESSURE: 147 MMHG | BODY MASS INDEX: 32.85 KG/M2 | DIASTOLIC BLOOD PRESSURE: 80 MMHG | HEIGHT: 72 IN | HEART RATE: 89 BPM | TEMPERATURE: 98.4 F

## 2020-05-19 DIAGNOSIS — C91.10 CLL (CHRONIC LYMPHOCYTIC LEUKEMIA) (HCC): Primary | ICD-10-CM

## 2020-05-19 LAB
ALBUMIN SERPL BCP-MCNC: 3.6 G/DL (ref 3.5–5.7)
ALP SERPL-CCNC: 70 U/L (ref 46–116)
ALT SERPL W P-5'-P-CCNC: 44 U/L (ref 12–78)
ANION GAP SERPL CALCULATED.3IONS-SCNC: 10 MMOL/L (ref 4–13)
AST SERPL W P-5'-P-CCNC: 36 U/L (ref 5–45)
BASOPHILS # BLD MANUAL: 0 THOUSAND/UL (ref 0–0.1)
BASOPHILS NFR MAR MANUAL: 0 % (ref 0–1)
BILIRUB SERPL-MCNC: 0.7 MG/DL (ref 0.2–1)
BUN SERPL-MCNC: 21 MG/DL (ref 5–25)
CALCIUM SERPL-MCNC: 8.9 MG/DL (ref 8.3–10.1)
CHLORIDE SERPL-SCNC: 104 MMOL/L (ref 98–108)
CO2 SERPL-SCNC: 26 MMOL/L (ref 21–32)
CREAT SERPL-MCNC: 1.2 MG/DL (ref 0.6–1.3)
EOSINOPHIL # BLD MANUAL: 0.34 THOUSAND/UL (ref 0–0.4)
EOSINOPHIL NFR BLD MANUAL: 6 % (ref 0–6)
ERYTHROCYTE [DISTWIDTH] IN BLOOD BY AUTOMATED COUNT: 13.8 % (ref 11.6–15.1)
GFR SERPL CREATININE-BSD FRML MDRD: 63 ML/MIN/1.73SQ M
GLUCOSE SERPL-MCNC: 163 MG/DL (ref 65–140)
HCT VFR BLD AUTO: 40.9 % (ref 36.5–49.3)
HGB BLD-MCNC: 13.8 G/DL (ref 12–17)
HGB RETIC QN AUTO: 35.1 PG (ref 30–38.3)
IGG SERPL-MCNC: 533 MG/DL (ref 700–1600)
IMM RETICS NFR: 14.9 % (ref 0–14)
LDH SERPL-CCNC: 237 U/L (ref 81–234)
LG PLATELETS BLD QL SMEAR: PRESENT
LYMPHOCYTES # BLD AUTO: 0.57 THOUSAND/UL (ref 0.6–4.47)
LYMPHOCYTES # BLD AUTO: 10 % (ref 14–44)
MCH RBC QN AUTO: 30.5 PG (ref 26.8–34.3)
MCHC RBC AUTO-ENTMCNC: 33.7 G/DL (ref 31.4–37.4)
MCV RBC AUTO: 91 FL (ref 82–98)
MONOCYTES # BLD AUTO: 0.68 THOUSAND/UL (ref 0–1.22)
MONOCYTES NFR BLD: 12 % (ref 4–12)
NEUTROPHILS # BLD MANUAL: 4.09 THOUSAND/UL (ref 1.85–7.62)
NEUTS SEG NFR BLD AUTO: 72 % (ref 43–75)
OVALOCYTES BLD QL SMEAR: PRESENT
PLATELET # BLD AUTO: 125 THOUSANDS/UL (ref 149–390)
PLATELET BLD QL SMEAR: ABNORMAL
PMV BLD AUTO: 10.8 FL (ref 8.9–12.7)
POTASSIUM SERPL-SCNC: 3.7 MMOL/L (ref 3.5–5.3)
PROT SERPL-MCNC: 6 G/DL (ref 6.4–8.2)
RBC # BLD AUTO: 4.52 MILLION/UL (ref 3.88–5.62)
RETICS # AUTO: ABNORMAL 10*3/UL (ref 14356–105094)
RETICS # CALC: 2.34 % (ref 0.37–1.87)
SODIUM SERPL-SCNC: 140 MMOL/L (ref 136–145)
TOTAL CELLS COUNTED SPEC: 100
URATE SERPL-MCNC: 3.9 MG/DL (ref 4.2–8)
WBC # BLD AUTO: 5.68 THOUSAND/UL (ref 4.31–10.16)

## 2020-05-19 PROCEDURE — 85007 BL SMEAR W/DIFF WBC COUNT: CPT | Performed by: INTERNAL MEDICINE

## 2020-05-19 PROCEDURE — 96367 TX/PROPH/DG ADDL SEQ IV INF: CPT

## 2020-05-19 PROCEDURE — 85046 RETICYTE/HGB CONCENTRATE: CPT | Performed by: INTERNAL MEDICINE

## 2020-05-19 PROCEDURE — 96413 CHEMO IV INFUSION 1 HR: CPT

## 2020-05-19 PROCEDURE — 82784 ASSAY IGA/IGD/IGG/IGM EACH: CPT | Performed by: INTERNAL MEDICINE

## 2020-05-19 PROCEDURE — 82232 ASSAY OF BETA-2 PROTEIN: CPT | Performed by: INTERNAL MEDICINE

## 2020-05-19 PROCEDURE — 85027 COMPLETE CBC AUTOMATED: CPT | Performed by: INTERNAL MEDICINE

## 2020-05-19 PROCEDURE — 83615 LACTATE (LD) (LDH) ENZYME: CPT | Performed by: INTERNAL MEDICINE

## 2020-05-19 PROCEDURE — 96415 CHEMO IV INFUSION ADDL HR: CPT

## 2020-05-19 PROCEDURE — 80053 COMPREHEN METABOLIC PANEL: CPT | Performed by: INTERNAL MEDICINE

## 2020-05-19 PROCEDURE — 84550 ASSAY OF BLOOD/URIC ACID: CPT | Performed by: INTERNAL MEDICINE

## 2020-05-19 RX ORDER — SODIUM CHLORIDE 9 MG/ML
20 INJECTION, SOLUTION INTRAVENOUS ONCE
Status: COMPLETED | OUTPATIENT
Start: 2020-05-19 | End: 2020-05-19

## 2020-05-19 RX ORDER — ACETAMINOPHEN 325 MG/1
650 TABLET ORAL ONCE
Status: COMPLETED | OUTPATIENT
Start: 2020-05-19 | End: 2020-05-19

## 2020-05-19 RX ADMIN — ACETAMINOPHEN 650 MG: 325 TABLET ORAL at 09:15

## 2020-05-19 RX ADMIN — SODIUM CHLORIDE 20 ML/HR: 0.9 INJECTION, SOLUTION INTRAVENOUS at 09:12

## 2020-05-19 RX ADMIN — DIPHENHYDRAMINE HYDROCHLORIDE 25 MG: 50 INJECTION, SOLUTION INTRAMUSCULAR; INTRAVENOUS at 09:33

## 2020-05-19 RX ADMIN — OBINUTUZUMAB 1000 MG: 1000 INJECTION, SOLUTION, CONCENTRATE INTRAVENOUS at 09:57

## 2020-05-19 RX ADMIN — DEXAMETHASONE SODIUM PHOSPHATE 20 MG: 10 INJECTION, SOLUTION INTRAMUSCULAR; INTRAVENOUS at 09:13

## 2020-05-21 LAB — B2 MICROGLOB SERPL-MCNC: 2.3 MG/L (ref 0.6–2.4)

## 2020-05-28 ENCOUNTER — TELEPHONE (OUTPATIENT)
Dept: CARDIOLOGY CLINIC | Facility: CLINIC | Age: 65
End: 2020-05-28

## 2020-05-28 DIAGNOSIS — G47.00 INSOMNIA, UNSPECIFIED TYPE: ICD-10-CM

## 2020-05-29 ENCOUNTER — TELEMEDICINE (OUTPATIENT)
Dept: FAMILY MEDICINE CLINIC | Facility: CLINIC | Age: 65
End: 2020-05-29
Payer: MEDICARE

## 2020-05-29 ENCOUNTER — TELEPHONE (OUTPATIENT)
Dept: CARDIOLOGY CLINIC | Facility: CLINIC | Age: 65
End: 2020-05-29

## 2020-05-29 DIAGNOSIS — C91.10 CLL (CHRONIC LYMPHOCYTIC LEUKEMIA) (HCC): ICD-10-CM

## 2020-05-29 DIAGNOSIS — F41.9 ANXIETY DISORDER, UNSPECIFIED TYPE: Primary | ICD-10-CM

## 2020-05-29 PROCEDURE — 99214 OFFICE O/P EST MOD 30 MIN: CPT | Performed by: INTERNAL MEDICINE

## 2020-05-29 RX ORDER — LORAZEPAM 0.5 MG/1
0.5 TABLET ORAL DAILY PRN
Qty: 30 TABLET | Refills: 0 | Status: SHIPPED | OUTPATIENT
Start: 2020-05-29 | End: 2020-08-11 | Stop reason: SDUPTHER

## 2020-06-02 ENCOUNTER — HOSPITAL ENCOUNTER (OUTPATIENT)
Dept: INFUSION CENTER | Facility: CLINIC | Age: 65
Discharge: HOME | End: 2020-06-02
Payer: MEDICARE

## 2020-06-02 VITALS
DIASTOLIC BLOOD PRESSURE: 82 MMHG | BODY MASS INDEX: 32.58 KG/M2 | RESPIRATION RATE: 18 BRPM | TEMPERATURE: 98.3 F | SYSTOLIC BLOOD PRESSURE: 144 MMHG | HEART RATE: 73 BPM | WEIGHT: 240.3 LBS

## 2020-06-02 DIAGNOSIS — C91.10 CLL (CHRONIC LYMPHOCYTIC LEUKEMIA) (HCC): Primary | ICD-10-CM

## 2020-06-02 DIAGNOSIS — D80.1 HYPOGAMMAGLOBULINEMIA, ACQUIRED (HCC): ICD-10-CM

## 2020-06-02 PROCEDURE — 96365 THER/PROPH/DIAG IV INF INIT: CPT

## 2020-06-02 PROCEDURE — 96367 TX/PROPH/DG ADDL SEQ IV INF: CPT

## 2020-06-02 PROCEDURE — 96375 TX/PRO/DX INJ NEW DRUG ADDON: CPT

## 2020-06-02 PROCEDURE — 96366 THER/PROPH/DIAG IV INF ADDON: CPT

## 2020-06-02 RX ORDER — ACETAMINOPHEN 325 MG/1
650 TABLET ORAL ONCE
Status: COMPLETED | OUTPATIENT
Start: 2020-06-02 | End: 2020-06-02

## 2020-06-02 RX ORDER — SODIUM CHLORIDE 9 MG/ML
20 INJECTION, SOLUTION INTRAVENOUS ONCE
Status: COMPLETED | OUTPATIENT
Start: 2020-06-02 | End: 2020-06-02

## 2020-06-02 RX ORDER — SODIUM CHLORIDE 9 MG/ML
20 INJECTION, SOLUTION INTRAVENOUS ONCE
Status: CANCELLED | OUTPATIENT
Start: 2020-06-30

## 2020-06-02 RX ORDER — ACETAMINOPHEN 325 MG/1
650 TABLET ORAL ONCE
Status: CANCELLED | OUTPATIENT
Start: 2020-06-30

## 2020-06-02 RX ADMIN — ACETAMINOPHEN 650 MG: 325 TABLET ORAL at 08:27

## 2020-06-02 RX ADMIN — DIPHENHYDRAMINE HYDROCHLORIDE 12.5 MG: 50 INJECTION, SOLUTION INTRAMUSCULAR; INTRAVENOUS at 08:34

## 2020-06-02 RX ADMIN — SODIUM CHLORIDE 20 ML/HR: 0.9 INJECTION, SOLUTION INTRAVENOUS at 08:26

## 2020-06-02 RX ADMIN — HYDROCORTISONE SODIUM SUCCINATE 100 MG: 100 INJECTION, POWDER, FOR SOLUTION INTRAMUSCULAR; INTRAVENOUS at 08:30

## 2020-06-02 RX ADMIN — Medication 22.5 G: at 09:04

## 2020-06-11 DIAGNOSIS — G62.9 NEUROPATHY: ICD-10-CM

## 2020-06-11 RX ORDER — ACETAMINOPHEN 325 MG/1
650 TABLET ORAL ONCE
Status: CANCELLED | OUTPATIENT
Start: 2020-06-16

## 2020-06-11 RX ORDER — GABAPENTIN 600 MG/1
TABLET ORAL
Qty: 90 TABLET | Refills: 5 | Status: SHIPPED | OUTPATIENT
Start: 2020-06-11 | End: 2021-01-20 | Stop reason: SDUPTHER

## 2020-06-11 RX ORDER — SODIUM CHLORIDE 9 MG/ML
20 INJECTION, SOLUTION INTRAVENOUS ONCE
Status: CANCELLED | OUTPATIENT
Start: 2020-06-16

## 2020-06-16 ENCOUNTER — HOSPITAL ENCOUNTER (OUTPATIENT)
Dept: INFUSION CENTER | Facility: CLINIC | Age: 65
Discharge: HOME | End: 2020-06-16
Payer: MEDICARE

## 2020-06-16 VITALS
DIASTOLIC BLOOD PRESSURE: 80 MMHG | RESPIRATION RATE: 18 BRPM | BODY MASS INDEX: 32.82 KG/M2 | HEIGHT: 72 IN | SYSTOLIC BLOOD PRESSURE: 152 MMHG | HEART RATE: 68 BPM | TEMPERATURE: 98.3 F | WEIGHT: 242.29 LBS

## 2020-06-16 DIAGNOSIS — C91.10 CLL (CHRONIC LYMPHOCYTIC LEUKEMIA) (HCC): Primary | ICD-10-CM

## 2020-06-16 LAB
ALBUMIN SERPL BCP-MCNC: 3.6 G/DL (ref 3.5–5.7)
ALP SERPL-CCNC: 56 U/L (ref 46–116)
ALT SERPL W P-5'-P-CCNC: 55 U/L (ref 12–78)
ANION GAP SERPL CALCULATED.3IONS-SCNC: 4 MMOL/L (ref 4–13)
AST SERPL W P-5'-P-CCNC: 41 U/L (ref 5–45)
BASOPHILS # BLD MANUAL: 0 THOUSAND/UL (ref 0–0.1)
BASOPHILS NFR MAR MANUAL: 0 % (ref 0–1)
BILIRUB SERPL-MCNC: 0.7 MG/DL (ref 0.2–1)
BUN SERPL-MCNC: 22 MG/DL (ref 5–25)
CALCIUM SERPL-MCNC: 9.1 MG/DL (ref 8.3–10.1)
CHLORIDE SERPL-SCNC: 106 MMOL/L (ref 98–108)
CO2 SERPL-SCNC: 28 MMOL/L (ref 21–32)
CREAT SERPL-MCNC: 0.8 MG/DL (ref 0.6–1.3)
EOSINOPHIL # BLD MANUAL: 0.14 THOUSAND/UL (ref 0–0.4)
EOSINOPHIL NFR BLD MANUAL: 3 % (ref 0–6)
ERYTHROCYTE [DISTWIDTH] IN BLOOD BY AUTOMATED COUNT: 14.1 % (ref 11.6–15.1)
GFR SERPL CREATININE-BSD FRML MDRD: 94 ML/MIN/1.73SQ M
GLUCOSE SERPL-MCNC: 172 MG/DL (ref 65–140)
HCT VFR BLD AUTO: 39.9 % (ref 36.5–49.3)
HGB BLD-MCNC: 13.3 G/DL (ref 12–17)
LG PLATELETS BLD QL SMEAR: PRESENT
LYMPHOCYTES # BLD AUTO: 0.88 THOUSAND/UL (ref 0.6–4.47)
LYMPHOCYTES # BLD AUTO: 19 % (ref 14–44)
MCH RBC QN AUTO: 30.2 PG (ref 26.8–34.3)
MCHC RBC AUTO-ENTMCNC: 33.3 G/DL (ref 31.4–37.4)
MCV RBC AUTO: 91 FL (ref 82–98)
MONOCYTES # BLD AUTO: 0.55 THOUSAND/UL (ref 0–1.22)
MONOCYTES NFR BLD: 12 % (ref 4–12)
NEUTROPHILS # BLD MANUAL: 3.05 THOUSAND/UL (ref 1.85–7.62)
NEUTS BAND NFR BLD MANUAL: 2 % (ref 0–8)
NEUTS SEG NFR BLD AUTO: 64 % (ref 43–75)
OVALOCYTES BLD QL SMEAR: PRESENT
PLATELET # BLD AUTO: 137 THOUSANDS/UL (ref 149–390)
PLATELET BLD QL SMEAR: ABNORMAL
PMV BLD AUTO: 11 FL (ref 8.9–12.7)
POTASSIUM SERPL-SCNC: 4.1 MMOL/L (ref 3.5–5.3)
PROT SERPL-MCNC: 6 G/DL (ref 6.4–8.2)
RBC # BLD AUTO: 4.4 MILLION/UL (ref 3.88–5.62)
SODIUM SERPL-SCNC: 138 MMOL/L (ref 136–145)
TOTAL CELLS COUNTED SPEC: 100
WBC # BLD AUTO: 4.62 THOUSAND/UL (ref 4.31–10.16)

## 2020-06-16 PROCEDURE — 96413 CHEMO IV INFUSION 1 HR: CPT

## 2020-06-16 PROCEDURE — 96415 CHEMO IV INFUSION ADDL HR: CPT

## 2020-06-16 PROCEDURE — 80053 COMPREHEN METABOLIC PANEL: CPT | Performed by: INTERNAL MEDICINE

## 2020-06-16 PROCEDURE — 96367 TX/PROPH/DG ADDL SEQ IV INF: CPT

## 2020-06-16 PROCEDURE — 85027 COMPLETE CBC AUTOMATED: CPT | Performed by: INTERNAL MEDICINE

## 2020-06-16 PROCEDURE — 85007 BL SMEAR W/DIFF WBC COUNT: CPT | Performed by: INTERNAL MEDICINE

## 2020-06-16 RX ORDER — SODIUM CHLORIDE 9 MG/ML
20 INJECTION, SOLUTION INTRAVENOUS ONCE
Status: COMPLETED | OUTPATIENT
Start: 2020-06-16 | End: 2020-06-16

## 2020-06-16 RX ORDER — ACETAMINOPHEN 325 MG/1
650 TABLET ORAL ONCE
Status: COMPLETED | OUTPATIENT
Start: 2020-06-16 | End: 2020-06-16

## 2020-06-16 RX ADMIN — DIPHENHYDRAMINE HYDROCHLORIDE 25 MG: 50 INJECTION, SOLUTION INTRAMUSCULAR; INTRAVENOUS at 09:14

## 2020-06-16 RX ADMIN — ACETAMINOPHEN 650 MG: 325 TABLET ORAL at 09:05

## 2020-06-16 RX ADMIN — DEXAMETHASONE SODIUM PHOSPHATE 20 MG: 10 INJECTION, SOLUTION INTRAMUSCULAR; INTRAVENOUS at 08:53

## 2020-06-16 RX ADMIN — OBINUTUZUMAB 1000 MG: 1000 INJECTION, SOLUTION, CONCENTRATE INTRAVENOUS at 10:12

## 2020-06-16 RX ADMIN — SODIUM CHLORIDE 20 ML/HR: 0.9 INJECTION, SOLUTION INTRAVENOUS at 08:42

## 2020-06-26 ENCOUNTER — OFFICE VISIT (OUTPATIENT)
Dept: OBGYN CLINIC | Facility: MEDICAL CENTER | Age: 65
End: 2020-06-26
Payer: MEDICARE

## 2020-06-26 VITALS
HEART RATE: 69 BPM | HEIGHT: 73 IN | WEIGHT: 241 LBS | BODY MASS INDEX: 31.94 KG/M2 | TEMPERATURE: 98.3 F | DIASTOLIC BLOOD PRESSURE: 77 MMHG | SYSTOLIC BLOOD PRESSURE: 146 MMHG

## 2020-06-26 DIAGNOSIS — M75.51 BURSITIS OF RIGHT SHOULDER: Primary | ICD-10-CM

## 2020-06-26 PROCEDURE — 3008F BODY MASS INDEX DOCD: CPT | Performed by: ORTHOPAEDIC SURGERY

## 2020-06-26 PROCEDURE — 2022F DILAT RTA XM EVC RTNOPTHY: CPT | Performed by: ORTHOPAEDIC SURGERY

## 2020-06-26 PROCEDURE — 4040F PNEUMOC VAC/ADMIN/RCVD: CPT | Performed by: ORTHOPAEDIC SURGERY

## 2020-06-26 PROCEDURE — 1036F TOBACCO NON-USER: CPT | Performed by: ORTHOPAEDIC SURGERY

## 2020-06-26 PROCEDURE — 99213 OFFICE O/P EST LOW 20 MIN: CPT | Performed by: ORTHOPAEDIC SURGERY

## 2020-06-26 PROCEDURE — 20610 DRAIN/INJ JOINT/BURSA W/O US: CPT | Performed by: ORTHOPAEDIC SURGERY

## 2020-06-26 RX ORDER — BUPIVACAINE HYDROCHLORIDE 2.5 MG/ML
4 INJECTION, SOLUTION INFILTRATION; PERINEURAL
Status: COMPLETED | OUTPATIENT
Start: 2020-06-26 | End: 2020-06-26

## 2020-06-26 RX ORDER — METHYLPREDNISOLONE ACETATE 40 MG/ML
1 INJECTION, SUSPENSION INTRA-ARTICULAR; INTRALESIONAL; INTRAMUSCULAR; SOFT TISSUE
Status: COMPLETED | OUTPATIENT
Start: 2020-06-26 | End: 2020-06-26

## 2020-06-26 RX ADMIN — METHYLPREDNISOLONE ACETATE 1 ML: 40 INJECTION, SUSPENSION INTRA-ARTICULAR; INTRALESIONAL; INTRAMUSCULAR; SOFT TISSUE at 08:53

## 2020-06-26 RX ADMIN — BUPIVACAINE HYDROCHLORIDE 4 ML: 2.5 INJECTION, SOLUTION INFILTRATION; PERINEURAL at 08:53

## 2020-06-30 ENCOUNTER — HOSPITAL ENCOUNTER (OUTPATIENT)
Dept: INFUSION CENTER | Facility: CLINIC | Age: 65
Discharge: HOME | End: 2020-06-30
Payer: MEDICARE

## 2020-06-30 VITALS
TEMPERATURE: 98.2 F | DIASTOLIC BLOOD PRESSURE: 86 MMHG | SYSTOLIC BLOOD PRESSURE: 147 MMHG | HEART RATE: 66 BPM | RESPIRATION RATE: 18 BRPM

## 2020-06-30 DIAGNOSIS — C91.10 CLL (CHRONIC LYMPHOCYTIC LEUKEMIA) (HCC): Primary | ICD-10-CM

## 2020-06-30 DIAGNOSIS — D80.1 HYPOGAMMAGLOBULINEMIA, ACQUIRED (HCC): ICD-10-CM

## 2020-06-30 PROCEDURE — 96365 THER/PROPH/DIAG IV INF INIT: CPT

## 2020-06-30 PROCEDURE — 96367 TX/PROPH/DG ADDL SEQ IV INF: CPT

## 2020-06-30 PROCEDURE — 96375 TX/PRO/DX INJ NEW DRUG ADDON: CPT

## 2020-06-30 PROCEDURE — 96366 THER/PROPH/DIAG IV INF ADDON: CPT

## 2020-06-30 RX ORDER — SODIUM CHLORIDE 9 MG/ML
20 INJECTION, SOLUTION INTRAVENOUS ONCE
Status: COMPLETED | OUTPATIENT
Start: 2020-06-30 | End: 2020-06-30

## 2020-06-30 RX ORDER — ACETAMINOPHEN 325 MG/1
650 TABLET ORAL ONCE
Status: COMPLETED | OUTPATIENT
Start: 2020-06-30 | End: 2020-06-30

## 2020-06-30 RX ORDER — SODIUM CHLORIDE 9 MG/ML
20 INJECTION, SOLUTION INTRAVENOUS ONCE
Status: CANCELLED | OUTPATIENT
Start: 2020-07-28

## 2020-06-30 RX ORDER — ACETAMINOPHEN 325 MG/1
650 TABLET ORAL ONCE
Status: CANCELLED | OUTPATIENT
Start: 2020-07-28

## 2020-06-30 RX ADMIN — HYDROCORTISONE SODIUM SUCCINATE 100 MG: 100 INJECTION, POWDER, FOR SOLUTION INTRAMUSCULAR; INTRAVENOUS at 08:55

## 2020-06-30 RX ADMIN — Medication 22.5 G: at 08:58

## 2020-06-30 RX ADMIN — SODIUM CHLORIDE 20 ML/HR: 0.9 INJECTION, SOLUTION INTRAVENOUS at 08:28

## 2020-06-30 RX ADMIN — DIPHENHYDRAMINE HYDROCHLORIDE 12.5 MG: 50 INJECTION, SOLUTION INTRAMUSCULAR; INTRAVENOUS at 08:28

## 2020-06-30 RX ADMIN — ACETAMINOPHEN 650 MG: 325 TABLET ORAL at 08:33

## 2020-06-30 NOTE — PLAN OF CARE
Problem: Potential for Falls  Goal: Patient will remain free of falls  Description  INTERVENTIONS:  - Assess patient frequently for physical needs  -  Identify cognitive and physical deficits and behaviors that affect risk of falls    -  [place] fall precautions as indicated by assessment   - Educate patient/family on patient safety including physical limitations  - Instruct patient to call for assistance with activity based on assessment  - Modify environment to reduce risk of injury  - Consider OT/PT consult to assist with strengthening/mobility  Outcome: Progressing

## 2020-07-07 ENCOUNTER — OFFICE VISIT (OUTPATIENT)
Dept: HEMATOLOGY ONCOLOGY | Facility: CLINIC | Age: 65
End: 2020-07-07
Payer: MEDICARE

## 2020-07-07 VITALS
OXYGEN SATURATION: 99 % | RESPIRATION RATE: 18 BRPM | DIASTOLIC BLOOD PRESSURE: 80 MMHG | SYSTOLIC BLOOD PRESSURE: 142 MMHG | HEART RATE: 79 BPM | WEIGHT: 236 LBS | BODY MASS INDEX: 31.28 KG/M2 | HEIGHT: 73 IN | TEMPERATURE: 98.7 F

## 2020-07-07 DIAGNOSIS — D80.1 HYPOGAMMAGLOBULINEMIA, ACQUIRED (HCC): ICD-10-CM

## 2020-07-07 DIAGNOSIS — D69.6 THROMBOCYTOPENIA (HCC): ICD-10-CM

## 2020-07-07 DIAGNOSIS — D75.82 HIT (HEPARIN-INDUCED THROMBOCYTOPENIA) (HCC): ICD-10-CM

## 2020-07-07 DIAGNOSIS — C91.10 CLL (CHRONIC LYMPHOCYTIC LEUKEMIA) (HCC): Primary | ICD-10-CM

## 2020-07-07 DIAGNOSIS — D59.10 AUTOIMMUNE HEMOLYTIC ANEMIA (HCC): ICD-10-CM

## 2020-07-07 PROCEDURE — 99214 OFFICE O/P EST MOD 30 MIN: CPT | Performed by: INTERNAL MEDICINE

## 2020-07-07 PROCEDURE — 3008F BODY MASS INDEX DOCD: CPT | Performed by: INTERNAL MEDICINE

## 2020-07-07 PROCEDURE — 1036F TOBACCO NON-USER: CPT | Performed by: INTERNAL MEDICINE

## 2020-07-07 PROCEDURE — 4040F PNEUMOC VAC/ADMIN/RCVD: CPT | Performed by: INTERNAL MEDICINE

## 2020-07-07 PROCEDURE — 2022F DILAT RTA XM EVC RTNOPTHY: CPT | Performed by: INTERNAL MEDICINE

## 2020-07-07 NOTE — PROGRESS NOTES
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     This cocktail has been very effective in his case  He has much less weakness and tiredness, no fevers, chills and bleeding, no weight loss, night sweats and swollen glands  No frequent or severe infections  Has some arthritic symptoms  Has vascular purpura on forearms         He has history  of chronic lung disease and he has cough and exertional dyspnea  and occasionally wheezing      He is taking vitamin-D and calcium and tries to stay active to prevent worsening of 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    Terrie Eric has coronary artery disease and has 1 stent and he takes 81 mg aspirin daily with food     History of heparin induced thrombocytopenia  CLL was diagnosed several years ago  He had been through Fludara based chemotherapy and pentostatin based chemotherapy  He had increased hemolysis when he was on chemotherapy  His most recent chemotherapy treatment was Cytoxan, Oncovin, prednisone and Rituxan and last treatment was in December 2012    In 2013 he was started on Rituxan, prednisone and folic acid because he started to [facility] again  IVIG was tried unsuccessfully so far as hemolysis is concerned  Rituxan has controlled the hemolysis    Information on the treatments from March 2017 onwards  On 3/20/17 patient found to have hemoglobin of 6 2, ,000  He was admitted to [facility] for blood transfusion and plan to transfer to [address] [place] [address]  On 3/20/17 WBC count 195,000, hemoglobin 4 9, platelet count 65  Direct coomb's was positive with undetectable haptoglobin  He was given 2 units of PRBCs, Solu-Medrol 1 g ×3 days and IVIG 1 g/kg daily ×3 days   His hemoglobin continued to drop  Bone marrow biopsy on 3/21 showed marrow cellularity of greater than 95% almost replaced by small lymphocytes, lambda light chain restricted, CD20 positive, CD19 positive, CD23 positive partially expressing CD11c and CD25 and CD5 positive and negative for CD 79 and CD38  He was treated with single dose of chlorambucil to control his CLL   3/22 second dose of chlorambucil, also Rituxan 375 mg/M ² autoimmune hemolytic anemia  WBC continued to rise, 266,000  Patient initiated with Venetoclax 20 mg daily  Tumor lysis monitored every 8 hours; given aggressive hydration and Lasix and remained euvolemic   Later venetoclax was changed to ibrutinib because of disease progression  Dec 2017- Ferol Charm decreased to 140 mg PO daily due to thrombocytopenia   Taye Ibarra added     4/23 - 4/27/18 patient was admitted due to listeria bacteremia  He was discharged on IV ampicillin  Echo negative for vegetations   Ferol Charm and Qasim Hale was on hold at that time          10/18/18 patient had EGD   This showed distal esophageal stricture that was dilated                               Current Outpatient Medications:     acyclovir (ZOVIRAX) 400 MG tablet, Take 1 tablet (400 mg total) by mouth 2 (two) times a day, Disp: 60 tablet, Rfl: 5    amLODIPine (NORVASC) 10 mg tablet, Take 1 tablet (10 mg total) by mouth daily, Disp: 90 tablet, Rfl: 3    aspirin 81 MG tablet, Take 81 mg by mouth every other day Every other day , Disp: , Rfl:     citalopram (CeleXA) 40 mg tablet, Take 1 tablet (40 mg total) by mouth daily, Disp: 90 tablet, Rfl: 1    fenofibrate (TRICOR) 145 mg tablet, TAKE 1 TABLET DAILY, Disp: 90 tablet, Rfl: 5    folic acid (FOLVITE) 1 mg tablet, Take 400 mcg by mouth daily , Disp: , Rfl:     furosemide (LASIX) 40 mg tablet, Take 1 tablet (40 mg total) by mouth daily, Disp: 90 tablet, Rfl: 3    gabapentin (NEURONTIN) 600 MG tablet, TAKE 1 TABLET (600 MG TOTAL) BY MOUTH DAILY, Disp: 30 tablet, Rfl: 2   gabapentin (NEURONTIN) 600 MG tablet, TAKE 1 TABLET DAILY, Disp: 90 tablet, Rfl: 5    glucose monitoring kit (FREESTYLE) monitoring kit, 1 each by Does not apply route as needed ( ) E 11 9, Disp: 1 each, Rfl: 0    HUMALOG KWIKPEN 100 units/mL injection pen, USE 5-10 UNITS BEFORE MEALS AS NEEDED FOR STEROID INDUCED HYPERGLYCEMIA, Disp: 15 mL, Rfl: 1    Ibrutinib 140 MG TABS, Take 140 mg by mouth daily, Disp: 30 tablet, Rfl: 11    insulin lispro (HUMALOG KWIKPEN) 100 units/mL injection pen, Use 5-10 units before meals as needed for steroid induced hyperglycemia, Disp: 5 pen, Rfl: 1    Insulin Syringe-Needle U-100 30G X 1/2" 0 3 ML MISC, by Does not apply route 2 (two) times a day, Disp: 100 each, Rfl: 6    Lancets (FREESTYLE) lancets, Use as instructed--test 2 times a day  E 11 9, Disp: 100 each, Rfl: 0    LORazepam (ATIVAN) 0 5 mg tablet, Take 1 tablet (0 5 mg total) by mouth daily as needed for anxiety, Disp: 30 tablet, Rfl: 0    losartan (COZAAR) 100 MG tablet, Take 1 tablet (100 mg total) by mouth daily, Disp: 90 tablet, Rfl: 1    multivitamin (THERAGRAN) TABS, Take 1 tablet by mouth daily, Disp: , Rfl:     obinutuzumab (GAZYVA) 1000 mg/40 mL SOLN, Infuse into a venous catheter, Disp: , Rfl:     oxyCODONE (ROXICODONE) 10 MG TABS, Take 1 tablet (10 mg total) by mouth every 6 (six) hours as needed for moderate pain For ongoing pain controlMax Daily Amount: 40 mg, Disp: 90 tablet, Rfl: 0    pantoprazole (PROTONIX) 40 mg tablet, Take 1 tablet (40 mg total) by mouth daily, Disp: 90 tablet, Rfl: 3    Potassium (POTASSIMIN PO), Take 20 mEq by mouth daily, Disp: , Rfl:     potassium chloride (K-DUR,KLOR-CON) 20 mEq tablet, Take 1 tablet (20 mEq total) by mouth daily, Disp: 90 tablet, Rfl: 3    predniSONE 5 mg tablet, Take 1 tablet (5 mg total) by mouth daily, Disp: 90 tablet, Rfl: 2    sodium chloride, PF, 0 9 %, 10 mL by Intracatheter route see administration instructions 10mL into port before and after ampicillin doses, Disp: , Rfl: 0    VENTOLIN  (90 Base) MCG/ACT inhaler, Inhale 2 puffs 4 (four) times a day as needed for wheezing, Disp: 2 Inhaler, Rfl: 3    zolpidem (AMBIEN) 5 mg tablet, Take 1 tablet (5 mg total) by mouth daily at bedtime as needed for sleep, Disp: 30 tablet, Rfl: 0    Allergies   Allergen Reactions    Heparin Other (See Comments)     Other reaction(s): Blood Disorders  clot    Macrolides And Ketolides Other (See Comments)     Interacts with other meds he is taking          CLL (chronic lymphocytic leukemia) ([facility] )    8/22/2017 -  Chemotherapy     [No matching medication found in this treatment plan]      4/24/2018 Initial Diagnosis     CLL (chronic lymphocytic leukemia) ([place] [address] )         ROS:  07/07/20 Reviewed 13 systems: See symptoms in HPI:   Weakness  Tiredness  Arthritis  Cough  Shortness of breath  Vascular purpura  Occasionally wheezing  Presently no headaches, seizures, dizziness, diplopia, dysphagia, hoarseness, chest pain, palpitations,  hemoptysis, abdominal pain, nausea, vomiting, change in bowel habits, melena, hematuria, fever, chills, bleeding, bone pains, skin rash, weight loss,   numbness,  claudication and gait problem  No frequent infections  Not unusually sensitive to heat or cold  No swelling of the ankles  No swollen glands  Patient is anxious  /80 (BP Location: Right arm, Patient Position: Sitting, Cuff Size: Adult)   Pulse 79   Temp 98 7 °F (37 1 °C)   Resp 18   Ht 6' 1" (1 854 m)   Wt 107 kg (236 lb)   SpO2 99%   BMI 31 14 kg/m²     Physical Exam:  Alert, oriented, not in distress, no icterus, no oral thrush, no palpable neck mass, clear lung fields, regular heart rate, abdomen  soft and non tender, no palpable abdominal mass, no ascites, no edema of ankles, no calf tenderness, no focal neurological deficit, no skin rash, no palpable lymphadenopathy, good arterial pulses, no clubbing  Patient is anxious  Performance status 1  Vascular purpura forearms    IMAGING:      LABS:  Results for orders placed or performed during the hospital encounter of 06/16/20   CBC and differential   Result Value Ref Range    WBC 4 62 4 31 - 10 16 Thousand/uL    RBC 4 40 3 88 - 5 62 Million/uL    Hemoglobin 13 3 12 0 - 17 0 g/dL    Hematocrit 39 9 36 5 - 49 3 %    MCV 91 82 - 98 fL    MCH 30 2 26 8 - 34 3 pg    MCHC 33 3 31 4 - 37 4 g/dL    RDW 14 1 11 6 - 15 1 %    MPV 11 0 8 9 - 12 7 fL    Platelets 025 (L) 824 - 390 Thousands/uL   Comprehensive metabolic panel   Result Value Ref Range    Sodium 138 136 - 145 mmol/L    Potassium 4 1 3 5 - 5 3 mmol/L    Chloride 106 98 - 108 mmol/L    CO2 28 21 - 32 mmol/L    ANION GAP 4 4 - 13 mmol/L    BUN 22 5 - 25 mg/dL    Creatinine 0 80 0 60 - 1 30 mg/dL    Glucose 172 (H) 65 - 140 mg/dL    Calcium 9 1 8 3 - 10 1 mg/dL    AST 41 5 - 45 U/L    ALT 55 12 - 78 U/L    Alkaline Phosphatase 56 46 - 116 U/L    Total Protein 6 0 (L) 6 4 - 8 2 g/dL    Albumin 3 6 3 5 - 5 7 g/dL    Total Bilirubin 0 70 0 20 - 1 00 mg/dL    eGFR 94 ml/min/1 73sq m   Manual Differential(PHLEBS Do Not Order)   Result Value Ref Range    Segmented % 64 43 - 75 %    Bands % 2 0 - 8 %    Lymphocytes % 19 14 - 44 %    Monocytes % 12 4 - 12 %    Eosinophils, % 3 0 - 6 %    Basophils % 0 0 - 1 %    Absolute Neutrophils 3 05 1 85 - 7 62 Thousand/uL    Lymphocytes Absolute 0 88 0 60 - 4 47 Thousand/uL    Monocytes Absolute 0 55 0 00 - 1 22 Thousand/uL    Eosinophils Absolute 0 14 0 00 - 0 40 Thousand/uL    Basophils Absolute 0 00 0 00 - 0 10 Thousand/uL    Total Counted 100     Ovalocytes Present     Platelet Estimate Borderline (A) Adequate    Large Platelet Present      *Note: Due to a large number of results and/or encounters for the requested time period, some results have not been displayed  A complete set of results can be found in Results Review       Labs, Imaging, & Other studies:   All pertinent labs and imaging studies were personally reviewed    Lab Results   Component Value Date     12/29/2015    K 4 1 06/16/2020     06/16/2020    CO2 28 06/16/2020    ANIONGAP 8 12/29/2015    BUN 22 06/16/2020    CREATININE 0 80 06/16/2020    GLUCOSE 109 12/29/2015    GLUF 113 (H) 06/18/2019    CALCIUM 9 1 06/16/2020    AST 41 06/16/2020    ALT 55 06/16/2020    ALKPHOS 56 06/16/2020    PROT 5 9 (L) 12/29/2015    BILITOT 0 7 12/29/2015    EGFR 94 06/16/2020     Lab Results   Component Value Date    WBC 4 62 06/16/2020    HGB 13 3 06/16/2020    HCT 39 9 06/16/2020    MCV 91 06/16/2020     (L) 06/16/2020       Reviewed and discussed with patient  Assessment and plan:  Patient has been receiving intravenous Gazyva once every 4 weeks, IVIG therapy once every 4 weeks, 140 mg ibrutinib daily, 5 mg prednisone daily and 1 mg folic acid daily for a long time for relapsed refractory CLL with autoimmune hemolytic anemia and hypogammaglobulinemia     This cocktail has been very effective in his case  He has much less weakness and tiredness, no fevers, chills and bleeding, no weight loss, night sweats and swollen glands  No frequent or severe infections  Has some arthritic symptoms  Has vascular purpura on forearms         He has history  of chronic lung disease and he has cough and exertional dyspnea  and occasionally wheezing      He is taking vitamin-D and calcium and tries to stay active to prevent worsening of 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    [facility] has coronary artery disease and has 1 stent and he takes 81 mg aspirin daily with food     History of heparin induced thrombocytopenia  CLL was diagnosed several years ago  He had been through Fludara based chemotherapy and pentostatin based chemotherapy  He had increased hemolysis when he was on chemotherapy  His most recent chemotherapy treatment was Cytoxan, Oncovin, prednisone and Rituxan and last treatment was in December 2012    In 2013 he was started on Rituxan, prednisone and folic acid because he started to hemolyse again  IVIG was tried unsuccessfully so far as hemolysis is concerned  Rituxan has controlled the hemolysis    Information on the treatments from March 2017 onwards  On 3/20/17 patient found to have hemoglobin of 6 2, ,000  He was admitted to [facility] for blood transfusion and plan to transfer to [address] [place] [address]  On 3/20/17 WBC count 195,000, hemoglobin 4 9, platelet count 65  Direct coomb's was positive with undetectable haptoglobin  He was given 2 units of PRBCs, Solu-Medrol 1 g ×3 days and IVIG 1 g/kg daily ×3 days  His hemoglobin continued to drop  Bone marrow biopsy on 3/21 showed marrow cellularity of greater than 95% almost replaced by small lymphocytes, lambda light chain restricted, CD20 positive, CD19 positive, CD23 positive partially expressing CD11c and CD25 and CD5 positive and negative for CD 79 and CD38  He was treated with single dose of chlorambucil to control his CLL   3/22 second dose of chlorambucil, also Rituxan 375 mg/M ² autoimmune hemolytic anemia  WBC continued to rise, 266,000  Patient initiated with Venetoclax 20 mg daily  Tumor lysis monitored every 8 hours; given aggressive hydration and Lasix and remained euvolemic   Later venetoclax was changed to ibrutinib because of disease progression  Dec 2017- Lelan Bonnet decreased to 140 mg PO daily due to thrombocytopenia   Claribel Suárez added     4/23 - 4/27/18 patient was admitted due to listeria bacteremia  He was discharged on IV ampicillin  Echo negative for vegetations  Lelan Bonnet and Hyannis Ally was on hold at that time          10/18/18 patient had EGD   This showed distal esophageal stricture that was dilated         Physical examination and test results are as recorded and discussed in very much detail  Hematologically stable  No change in therapy  All discussed in detail  Questions answered    Discussed the importance of eating healthy foods, staying active and health screening test   Goal is prolongation of survival and prevention of complications  Patient is capable of self-care  Discussed precautions against coronavirus  Patient is immunocompromised   1  CLL (chronic lymphocytic leukemia) ([facility] )      2  Hypogammaglobulinemia, acquired ([facility] )      3  Autoimmune hemolytic anemia (HCC)      4  Thrombocytopenia ([facility] )      5  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

## 2020-07-09 RX ORDER — SODIUM CHLORIDE 9 MG/ML
20 INJECTION, SOLUTION INTRAVENOUS ONCE
Status: CANCELLED | OUTPATIENT
Start: 2020-07-14

## 2020-07-09 RX ORDER — ACETAMINOPHEN 325 MG/1
650 TABLET ORAL ONCE
Status: CANCELLED | OUTPATIENT
Start: 2020-07-14

## 2020-07-14 ENCOUNTER — HOSPITAL ENCOUNTER (OUTPATIENT)
Dept: INFUSION CENTER | Facility: CLINIC | Age: 65
Discharge: HOME | End: 2020-07-14
Payer: MEDICARE

## 2020-07-14 VITALS
RESPIRATION RATE: 18 BRPM | BODY MASS INDEX: 31.29 KG/M2 | WEIGHT: 236.11 LBS | SYSTOLIC BLOOD PRESSURE: 187 MMHG | TEMPERATURE: 98.1 F | HEART RATE: 76 BPM | HEIGHT: 73 IN | DIASTOLIC BLOOD PRESSURE: 102 MMHG

## 2020-07-14 DIAGNOSIS — C91.10 CLL (CHRONIC LYMPHOCYTIC LEUKEMIA) (HCC): Primary | ICD-10-CM

## 2020-07-14 LAB
ALBUMIN SERPL BCP-MCNC: 4.2 G/DL (ref 3.5–5.7)
ALP SERPL-CCNC: 59 U/L (ref 46–116)
ALT SERPL W P-5'-P-CCNC: 52 U/L (ref 12–78)
ANION GAP SERPL CALCULATED.3IONS-SCNC: 8 MMOL/L (ref 4–13)
AST SERPL W P-5'-P-CCNC: 40 U/L (ref 5–45)
BASOPHILS # BLD MANUAL: 0 THOUSAND/UL (ref 0–0.1)
BASOPHILS NFR MAR MANUAL: 0 % (ref 0–1)
BILIRUB SERPL-MCNC: 0.8 MG/DL (ref 0.2–1)
BUN SERPL-MCNC: 17 MG/DL (ref 5–25)
CALCIUM SERPL-MCNC: 9.4 MG/DL (ref 8.3–10.1)
CHLORIDE SERPL-SCNC: 107 MMOL/L (ref 98–108)
CO2 SERPL-SCNC: 28 MMOL/L (ref 21–32)
CREAT SERPL-MCNC: 1 MG/DL (ref 0.6–1.3)
EOSINOPHIL # BLD MANUAL: 0.29 THOUSAND/UL (ref 0–0.4)
EOSINOPHIL NFR BLD MANUAL: 6 % (ref 0–6)
ERYTHROCYTE [DISTWIDTH] IN BLOOD BY AUTOMATED COUNT: 13.9 % (ref 11.6–15.1)
GFR SERPL CREATININE-BSD FRML MDRD: 79 ML/MIN/1.73SQ M
GLUCOSE SERPL-MCNC: 198 MG/DL (ref 65–140)
HCT VFR BLD AUTO: 42.9 % (ref 36.5–49.3)
HGB BLD-MCNC: 14.6 G/DL (ref 12–17)
HGB RETIC QN AUTO: 34.9 PG (ref 30–38.3)
IGG SERPL-MCNC: 580 MG/DL (ref 700–1600)
IMM RETICS NFR: 17.9 % (ref 0–14)
LDH SERPL-CCNC: 286 U/L (ref 81–234)
LYMPHOCYTES # BLD AUTO: 0.62 THOUSAND/UL (ref 0.6–4.47)
LYMPHOCYTES # BLD AUTO: 13 % (ref 14–44)
MCH RBC QN AUTO: 30.9 PG (ref 26.8–34.3)
MCHC RBC AUTO-ENTMCNC: 34 G/DL (ref 31.4–37.4)
MCV RBC AUTO: 91 FL (ref 82–98)
MONOCYTES # BLD AUTO: 0.57 THOUSAND/UL (ref 0–1.22)
MONOCYTES NFR BLD: 12 % (ref 4–12)
NEUTROPHILS # BLD MANUAL: 3.28 THOUSAND/UL (ref 1.85–7.62)
NEUTS BAND NFR BLD MANUAL: 1 % (ref 0–8)
NEUTS SEG NFR BLD AUTO: 68 % (ref 43–75)
PLATELET # BLD AUTO: 115 THOUSANDS/UL (ref 149–390)
PLATELET BLD QL SMEAR: ABNORMAL
PMV BLD AUTO: 11.2 FL (ref 8.9–12.7)
POTASSIUM SERPL-SCNC: 3.9 MMOL/L (ref 3.5–5.3)
PROT SERPL-MCNC: 6.4 G/DL (ref 6.4–8.2)
RBC # BLD AUTO: 4.72 MILLION/UL (ref 3.88–5.62)
RBC MORPH BLD: NORMAL
RETICS # AUTO: ABNORMAL 10*3/UL (ref 14356–105094)
RETICS # CALC: 2.87 % (ref 0.37–1.87)
SODIUM SERPL-SCNC: 143 MMOL/L (ref 136–145)
TOTAL CELLS COUNTED SPEC: 100
URATE SERPL-MCNC: 4.4 MG/DL (ref 4.2–8)
WBC # BLD AUTO: 4.75 THOUSAND/UL (ref 4.31–10.16)

## 2020-07-14 PROCEDURE — 85027 COMPLETE CBC AUTOMATED: CPT | Performed by: INTERNAL MEDICINE

## 2020-07-14 PROCEDURE — 83615 LACTATE (LD) (LDH) ENZYME: CPT | Performed by: INTERNAL MEDICINE

## 2020-07-14 PROCEDURE — 96367 TX/PROPH/DG ADDL SEQ IV INF: CPT

## 2020-07-14 PROCEDURE — 85007 BL SMEAR W/DIFF WBC COUNT: CPT | Performed by: INTERNAL MEDICINE

## 2020-07-14 PROCEDURE — 96413 CHEMO IV INFUSION 1 HR: CPT

## 2020-07-14 PROCEDURE — 80053 COMPREHEN METABOLIC PANEL: CPT | Performed by: INTERNAL MEDICINE

## 2020-07-14 PROCEDURE — 85046 RETICYTE/HGB CONCENTRATE: CPT | Performed by: INTERNAL MEDICINE

## 2020-07-14 PROCEDURE — 82784 ASSAY IGA/IGD/IGG/IGM EACH: CPT | Performed by: INTERNAL MEDICINE

## 2020-07-14 PROCEDURE — 84550 ASSAY OF BLOOD/URIC ACID: CPT | Performed by: INTERNAL MEDICINE

## 2020-07-14 PROCEDURE — 96415 CHEMO IV INFUSION ADDL HR: CPT

## 2020-07-14 PROCEDURE — 82232 ASSAY OF BETA-2 PROTEIN: CPT | Performed by: INTERNAL MEDICINE

## 2020-07-14 RX ORDER — ACETAMINOPHEN 325 MG/1
650 TABLET ORAL ONCE
Status: COMPLETED | OUTPATIENT
Start: 2020-07-14 | End: 2020-07-14

## 2020-07-14 RX ORDER — SODIUM CHLORIDE 9 MG/ML
20 INJECTION, SOLUTION INTRAVENOUS ONCE
Status: COMPLETED | OUTPATIENT
Start: 2020-07-14 | End: 2020-07-14

## 2020-07-14 RX ADMIN — DEXAMETHASONE SODIUM PHOSPHATE 20 MG: 10 INJECTION, SOLUTION INTRAMUSCULAR; INTRAVENOUS at 09:16

## 2020-07-14 RX ADMIN — DIPHENHYDRAMINE HYDROCHLORIDE 25 MG: 50 INJECTION, SOLUTION INTRAMUSCULAR; INTRAVENOUS at 09:34

## 2020-07-14 RX ADMIN — ACETAMINOPHEN 650 MG: 325 TABLET ORAL at 08:35

## 2020-07-14 RX ADMIN — OBINUTUZUMAB 1000 MG: 1000 INJECTION, SOLUTION, CONCENTRATE INTRAVENOUS at 10:33

## 2020-07-14 RX ADMIN — SODIUM CHLORIDE 20 ML/HR: 0.9 INJECTION, SOLUTION INTRAVENOUS at 08:40

## 2020-07-14 NOTE — ASSESSMENT & PLAN NOTE
Vaccine Information Statement(s) was given today. This has been reviewed, questions answered, and verbal consent given by Patient for injection(s) and administration of Tetanus/Diphtheria/Pertussis (Tdap).    Patient tolerated without incident. See immunization grid for documentation.         · DD include postheraptic neuralgia vs cluster headache as pain has been relieved with Sumatriptan  · Will continue pain control with Neurontin, Tylenol, Sumatriptan and Oxycodone as last resort    · Discontinued Fioricet

## 2020-07-16 ENCOUNTER — TELEPHONE (OUTPATIENT)
Dept: HEMATOLOGY ONCOLOGY | Facility: CLINIC | Age: 65
End: 2020-07-16

## 2020-07-16 LAB — B2 MICROGLOB SERPL-MCNC: 2.1 MG/L (ref 0.6–2.4)

## 2020-07-16 NOTE — TELEPHONE ENCOUNTER
Renetta Barron   to Dharmesh Laureano MD         7/15/20 8:36 AM   I was looking on my blood test work  i cant fine my  immune system results  was wondering what the where           Spoke to patient informing him per Dr Jair Moore that his IgG level was slightly low but stable  His overall immune system is low because of his disease and treatment  Patient voiced understanding of above

## 2020-07-28 ENCOUNTER — HOSPITAL ENCOUNTER (OUTPATIENT)
Dept: INFUSION CENTER | Facility: CLINIC | Age: 65
Discharge: HOME | End: 2020-07-28
Payer: MEDICARE

## 2020-07-28 VITALS
WEIGHT: 234.35 LBS | TEMPERATURE: 97.9 F | HEIGHT: 73 IN | DIASTOLIC BLOOD PRESSURE: 80 MMHG | SYSTOLIC BLOOD PRESSURE: 192 MMHG | RESPIRATION RATE: 16 BRPM | HEART RATE: 80 BPM | BODY MASS INDEX: 31.06 KG/M2

## 2020-07-28 DIAGNOSIS — C91.10 CLL (CHRONIC LYMPHOCYTIC LEUKEMIA) (HCC): Primary | ICD-10-CM

## 2020-07-28 DIAGNOSIS — D80.1 HYPOGAMMAGLOBULINEMIA, ACQUIRED (HCC): ICD-10-CM

## 2020-07-28 PROCEDURE — 96365 THER/PROPH/DIAG IV INF INIT: CPT

## 2020-07-28 PROCEDURE — 96375 TX/PRO/DX INJ NEW DRUG ADDON: CPT

## 2020-07-28 PROCEDURE — 96366 THER/PROPH/DIAG IV INF ADDON: CPT

## 2020-07-28 PROCEDURE — 96367 TX/PROPH/DG ADDL SEQ IV INF: CPT

## 2020-07-28 RX ORDER — ACETAMINOPHEN 325 MG/1
650 TABLET ORAL ONCE
Status: COMPLETED | OUTPATIENT
Start: 2020-07-28 | End: 2020-07-28

## 2020-07-28 RX ORDER — ACETAMINOPHEN 325 MG/1
650 TABLET ORAL ONCE
Status: CANCELLED | OUTPATIENT
Start: 2020-08-25

## 2020-07-28 RX ORDER — SODIUM CHLORIDE 9 MG/ML
20 INJECTION, SOLUTION INTRAVENOUS ONCE
Status: CANCELLED | OUTPATIENT
Start: 2020-08-25

## 2020-07-28 RX ORDER — SODIUM CHLORIDE 9 MG/ML
20 INJECTION, SOLUTION INTRAVENOUS ONCE
Status: COMPLETED | OUTPATIENT
Start: 2020-07-28 | End: 2020-07-28

## 2020-07-28 RX ADMIN — DIPHENHYDRAMINE HYDROCHLORIDE 12.5 MG: 50 INJECTION, SOLUTION INTRAMUSCULAR; INTRAVENOUS at 08:28

## 2020-07-28 RX ADMIN — Medication 20 G: at 08:55

## 2020-07-28 RX ADMIN — ACETAMINOPHEN 650 MG: 325 TABLET, FILM COATED ORAL at 08:25

## 2020-07-28 RX ADMIN — HYDROCORTISONE SODIUM SUCCINATE 100 MG: 100 INJECTION, POWDER, FOR SOLUTION INTRAMUSCULAR; INTRAVENOUS at 08:25

## 2020-07-28 RX ADMIN — SODIUM CHLORIDE 20 ML/HR: 0.9 INJECTION, SOLUTION INTRAVENOUS at 08:25

## 2020-07-28 NOTE — PLAN OF CARE
Problem: Potential for Falls  Goal: Patient will remain free of falls  Description  INTERVENTIONS:  - Assess patient frequently for physical needs  -  Identify cognitive and physical deficits and behaviors that affect risk of falls  -  [place] fall precautions as indicated by assessment   - Educate patient/family on patient safety including physical limitations  - Instruct patient to call for assistance with activity based on assessment  - Modify environment to reduce risk of injury  - Consider OT/PT consult to assist with strengthening/mobility  Outcome: Progressing     Problem: PAIN - ADULT  Goal: Verbalizes/displays adequate comfort level or baseline comfort level  Description  Interventions:  - Encourage patient to monitor pain and request assistance  - Assess pain using appropriate pain scale  - Administer analgesics based on type and severity of pain and evaluate response  - Implement non-pharmacological measures as appropriate and evaluate response  - Consider cultural and social influences on pain and pain management  - Notify physician/advanced practitioner if interventions unsuccessful or patient reports new pain  Outcome: Progressing     Problem: SAFETY ADULT  Goal: Patient will remain free of falls  Description  INTERVENTIONS:  - Assess patient frequently for physical needs  -  Identify cognitive and physical deficits and behaviors that affect risk of falls    -  [place] fall precautions as indicated by assessment   - Educate patient/family on patient safety including physical limitations  - Instruct patient to call for assistance with activity based on assessment  - Modify environment to reduce risk of injury  - Consider OT/PT consult to assist with strengthening/mobility  Outcome: Progressing     Problem: DISCHARGE PLANNING  Goal: Discharge to home or other facility with appropriate resources  Description  INTERVENTIONS:  - Identify barriers to discharge w/patient and caregiver  - Arrange for needed discharge resources and transportation as appropriate  - Identify discharge learning needs (meds, wound care, etc )  - Arrange for interpretive services to assist at discharge as needed  - Refer to Case Management Department for coordinating discharge planning if the patient needs post-hospital services based on physician/advanced practitioner order or complex needs related to functional status, cognitive ability, or social support system  Outcome: Progressing     Problem: Knowledge Deficit  Goal: Patient/family/caregiver demonstrates understanding of disease process, treatment plan, medications, and discharge instructions  Description  Complete learning assessment and assess knowledge base    Interventions:  - Provide teaching at level of understanding  - Provide teaching via preferred learning methods  Outcome: Progressing

## 2020-07-28 NOTE — PROGRESS NOTES
Patient to the unit for Gamunex-C  Voices no new concerns  Tolerated infusion without adverse reaction  Declined AVS, aware of future appointments  Voices no questions or concerns at discharge

## 2020-07-29 ENCOUNTER — TELEPHONE (OUTPATIENT)
Dept: GASTROENTEROLOGY | Facility: CLINIC | Age: 65
End: 2020-07-29

## 2020-07-29 NOTE — TELEPHONE ENCOUNTER
----- Message from America Cotter DO sent at 7/29/2020  9:28 AM EDT -----  Please schedule egd for pt at [address] [facility]      Thanks,  Yon iniguez

## 2020-07-29 NOTE — TELEPHONE ENCOUNTER
EGD scheduled for 09/08/20 with Dr Andrews [facility]  Instructions given verbally/mailed to patient

## 2020-08-04 ENCOUNTER — TELEPHONE (OUTPATIENT)
Dept: HEMATOLOGY ONCOLOGY | Facility: CLINIC | Age: 65
End: 2020-08-04

## 2020-08-04 DIAGNOSIS — C91.10 CLL (CHRONIC LYMPHOCYTIC LEUKEMIA) (HCC): Primary | ICD-10-CM

## 2020-08-04 DIAGNOSIS — F41.9 ANXIETY: ICD-10-CM

## 2020-08-04 RX ORDER — CITALOPRAM 40 MG/1
TABLET ORAL
Qty: 90 TABLET | Refills: 5 | Status: SHIPPED | OUTPATIENT
Start: 2020-08-04 | End: 2020-12-28

## 2020-08-04 NOTE — PROGRESS NOTES
Called AL infusion and scheduled patient with Marina Blackman for more Bob appts  Scheduled patient for:  8/11 at 8am  9/9 at [address] is working on scheduling more cycles for patient

## 2020-08-04 NOTE — TELEPHONE ENCOUNTER
Called AL infusion and reviewed patient was never scheduled for more Gazyva treatments after July  Scheduled patient with Marina Blackman from AL infusion for the following dates:    8/11 at 8am  9/9 at [address] [place] [address] patient and left voicemail on his cell phone  Attempted to call his home phone but a "busy signal" is received

## 2020-08-04 NOTE — TELEPHONE ENCOUNTER
----- Message from Divina Love sent at 8/4/2020 12:12 PM EDT -----  Regarding: FW: Non-Urgent Medical Question  Contact: [phone]  Can you pleas review the patient infusion schedule? He is stating that he was scheduled for 8/8/20 but I don't see that appt was every scheduled  I see his next infusion is scheduled for 8/25/20, Does he need to be scheduled for the week of 8/8/20? Please let me know and I will inform the patient   Thank you    ----- Message -----  From: Gracie Del Cid  Sent: 8/4/2020  11:54 AM EDT  To: [facility]  Subject: RE: Non-Urgent Medical Question                  Sorry i forgot to put down it is for infusion

## 2020-08-06 RX ORDER — ACETAMINOPHEN 325 MG/1
650 TABLET ORAL ONCE
Status: CANCELLED | OUTPATIENT
Start: 2020-08-11

## 2020-08-06 RX ORDER — SODIUM CHLORIDE 9 MG/ML
20 INJECTION, SOLUTION INTRAVENOUS ONCE
Status: CANCELLED | OUTPATIENT
Start: 2020-08-11

## 2020-08-11 ENCOUNTER — OFFICE VISIT (OUTPATIENT)
Dept: CARDIOLOGY CLINIC | Facility: CLINIC | Age: 65
End: 2020-08-11
Payer: MEDICARE

## 2020-08-11 ENCOUNTER — HOSPITAL ENCOUNTER (OUTPATIENT)
Dept: INFUSION CENTER | Facility: CLINIC | Age: 65
Discharge: HOME | End: 2020-08-11
Payer: MEDICARE

## 2020-08-11 VITALS
DIASTOLIC BLOOD PRESSURE: 81 MMHG | SYSTOLIC BLOOD PRESSURE: 144 MMHG | HEIGHT: 73 IN | WEIGHT: 235.23 LBS | BODY MASS INDEX: 31.18 KG/M2 | TEMPERATURE: 98 F | HEART RATE: 75 BPM | RESPIRATION RATE: 16 BRPM

## 2020-08-11 VITALS
WEIGHT: 237 LBS | BODY MASS INDEX: 31.41 KG/M2 | OXYGEN SATURATION: 99 % | HEIGHT: 73 IN | HEART RATE: 84 BPM | SYSTOLIC BLOOD PRESSURE: 152 MMHG | DIASTOLIC BLOOD PRESSURE: 86 MMHG

## 2020-08-11 DIAGNOSIS — F41.9 ANXIETY DISORDER, UNSPECIFIED TYPE: ICD-10-CM

## 2020-08-11 DIAGNOSIS — I25.10 CORONARY ARTERY DISEASE INVOLVING NATIVE CORONARY ARTERY OF NATIVE HEART WITHOUT ANGINA PECTORIS: Primary | ICD-10-CM

## 2020-08-11 DIAGNOSIS — I10 ESSENTIAL HYPERTENSION: ICD-10-CM

## 2020-08-11 DIAGNOSIS — C91.10 CLL (CHRONIC LYMPHOCYTIC LEUKEMIA) (HCC): Primary | ICD-10-CM

## 2020-08-11 DIAGNOSIS — E78.2 MIXED HYPERLIPIDEMIA: ICD-10-CM

## 2020-08-11 DIAGNOSIS — J44.9 CHRONIC OBSTRUCTIVE PULMONARY DISEASE, UNSPECIFIED COPD TYPE (HCC): ICD-10-CM

## 2020-08-11 LAB
ALBUMIN SERPL BCP-MCNC: 3.6 G/DL (ref 3.5–5.7)
ALP SERPL-CCNC: 53 U/L (ref 46–116)
ALT SERPL W P-5'-P-CCNC: 43 U/L (ref 12–78)
ANION GAP SERPL CALCULATED.3IONS-SCNC: 10 MMOL/L (ref 4–13)
AST SERPL W P-5'-P-CCNC: 36 U/L (ref 5–45)
BASOPHILS # BLD MANUAL: 0 THOUSAND/UL (ref 0–0.1)
BASOPHILS NFR MAR MANUAL: 0 % (ref 0–1)
BILIRUB SERPL-MCNC: 0.6 MG/DL (ref 0.2–1)
BUN SERPL-MCNC: 20 MG/DL (ref 5–25)
CALCIUM SERPL-MCNC: 9.5 MG/DL (ref 8.3–10.1)
CHLORIDE SERPL-SCNC: 106 MMOL/L (ref 98–108)
CO2 SERPL-SCNC: 26 MMOL/L (ref 21–32)
CREAT SERPL-MCNC: 1.2 MG/DL (ref 0.6–1.3)
EOSINOPHIL # BLD MANUAL: 0.27 THOUSAND/UL (ref 0–0.4)
EOSINOPHIL NFR BLD MANUAL: 6 % (ref 0–6)
ERYTHROCYTE [DISTWIDTH] IN BLOOD BY AUTOMATED COUNT: 13.7 % (ref 11.6–15.1)
GFR SERPL CREATININE-BSD FRML MDRD: 63 ML/MIN/1.73SQ M
GLUCOSE SERPL-MCNC: 168 MG/DL (ref 65–140)
HCT VFR BLD AUTO: 41.5 % (ref 36.5–49.3)
HGB BLD-MCNC: 14.2 G/DL (ref 12–17)
LYMPHOCYTES # BLD AUTO: 0.72 THOUSAND/UL (ref 0.6–4.47)
LYMPHOCYTES # BLD AUTO: 16 % (ref 14–44)
MCH RBC QN AUTO: 30.8 PG (ref 26.8–34.3)
MCHC RBC AUTO-ENTMCNC: 34.2 G/DL (ref 31.4–37.4)
MCV RBC AUTO: 90 FL (ref 82–98)
METAMYELOCYTES NFR BLD MANUAL: 1 % (ref 0–1)
MONOCYTES # BLD AUTO: 0.36 THOUSAND/UL (ref 0–1.22)
MONOCYTES NFR BLD: 8 % (ref 4–12)
NEUTROPHILS # BLD MANUAL: 3.08 THOUSAND/UL (ref 1.85–7.62)
NEUTS BAND NFR BLD MANUAL: 1 % (ref 0–8)
NEUTS SEG NFR BLD AUTO: 68 % (ref 43–75)
PLATELET # BLD AUTO: 130 THOUSANDS/UL (ref 149–390)
PLATELET BLD QL SMEAR: ABNORMAL
PMV BLD AUTO: 11.1 FL (ref 8.9–12.7)
POTASSIUM SERPL-SCNC: 3.6 MMOL/L (ref 3.5–5.3)
PROT SERPL-MCNC: 6.1 G/DL (ref 6.4–8.2)
RBC # BLD AUTO: 4.61 MILLION/UL (ref 3.88–5.62)
RBC MORPH BLD: NORMAL
SODIUM SERPL-SCNC: 142 MMOL/L (ref 136–145)
TOTAL CELLS COUNTED SPEC: 100
WBC # BLD AUTO: 4.47 THOUSAND/UL (ref 4.31–10.16)

## 2020-08-11 PROCEDURE — 96415 CHEMO IV INFUSION ADDL HR: CPT

## 2020-08-11 PROCEDURE — 85027 COMPLETE CBC AUTOMATED: CPT | Performed by: INTERNAL MEDICINE

## 2020-08-11 PROCEDURE — 96367 TX/PROPH/DG ADDL SEQ IV INF: CPT

## 2020-08-11 PROCEDURE — 1036F TOBACCO NON-USER: CPT | Performed by: INTERNAL MEDICINE

## 2020-08-11 PROCEDURE — 99213 OFFICE O/P EST LOW 20 MIN: CPT | Performed by: INTERNAL MEDICINE

## 2020-08-11 PROCEDURE — 85007 BL SMEAR W/DIFF WBC COUNT: CPT | Performed by: INTERNAL MEDICINE

## 2020-08-11 PROCEDURE — 2022F DILAT RTA XM EVC RTNOPTHY: CPT | Performed by: INTERNAL MEDICINE

## 2020-08-11 PROCEDURE — 4040F PNEUMOC VAC/ADMIN/RCVD: CPT | Performed by: INTERNAL MEDICINE

## 2020-08-11 PROCEDURE — 96413 CHEMO IV INFUSION 1 HR: CPT

## 2020-08-11 PROCEDURE — 3008F BODY MASS INDEX DOCD: CPT | Performed by: INTERNAL MEDICINE

## 2020-08-11 PROCEDURE — 80053 COMPREHEN METABOLIC PANEL: CPT | Performed by: INTERNAL MEDICINE

## 2020-08-11 RX ORDER — SODIUM CHLORIDE 9 MG/ML
20 INJECTION, SOLUTION INTRAVENOUS ONCE
Status: COMPLETED | OUTPATIENT
Start: 2020-08-11 | End: 2020-08-11

## 2020-08-11 RX ORDER — ACETAMINOPHEN 325 MG/1
650 TABLET ORAL ONCE
Status: COMPLETED | OUTPATIENT
Start: 2020-08-11 | End: 2020-08-11

## 2020-08-11 RX ADMIN — DEXAMETHASONE SODIUM PHOSPHATE 20 MG: 10 INJECTION, SOLUTION INTRAMUSCULAR; INTRAVENOUS at 09:02

## 2020-08-11 RX ADMIN — ACETAMINOPHEN 650 MG: 325 TABLET, FILM COATED ORAL at 08:36

## 2020-08-11 RX ADMIN — SODIUM CHLORIDE 20 ML/HR: 0.9 INJECTION, SOLUTION INTRAVENOUS at 08:36

## 2020-08-11 RX ADMIN — DIPHENHYDRAMINE HYDROCHLORIDE 25 MG: 50 INJECTION, SOLUTION INTRAMUSCULAR; INTRAVENOUS at 09:28

## 2020-08-11 RX ADMIN — OBINUTUZUMAB 1000 MG: 1000 INJECTION, SOLUTION, CONCENTRATE INTRAVENOUS at 09:59

## 2020-08-11 NOTE — PLAN OF CARE
Problem: Potential for Falls  Goal: Patient will remain free of falls  Description: INTERVENTIONS:  - Assess patient frequently for physical needs  -  Identify cognitive and physical deficits and behaviors that affect risk of falls  -  [place] fall precautions as indicated by assessment   - Educate patient/family on patient safety including physical limitations  - Instruct patient to call for assistance with activity based on assessment  - Modify environment to reduce risk of injury  - Consider OT/PT consult to assist with strengthening/mobility  Outcome: Progressing     Problem: PAIN - ADULT  Goal: Verbalizes/displays adequate comfort level or baseline comfort level  Description: Interventions:  - Encourage patient to monitor pain and request assistance  - Assess pain using appropriate pain scale  - Administer analgesics based on type and severity of pain and evaluate response  - Implement non-pharmacological measures as appropriate and evaluate response  - Consider cultural and social influences on pain and pain management  - Notify physician/advanced practitioner if interventions unsuccessful or patient reports new pain  Outcome: Progressing     Problem: SAFETY ADULT  Goal: Patient will remain free of falls  Description: INTERVENTIONS:  - Assess patient frequently for physical needs  -  Identify cognitive and physical deficits and behaviors that affect risk of falls    -  [place] fall precautions as indicated by assessment   - Educate patient/family on patient safety including physical limitations  - Instruct patient to call for assistance with activity based on assessment  - Modify environment to reduce risk of injury  - Consider OT/PT consult to assist with strengthening/mobility  Outcome: Progressing     Problem: DISCHARGE PLANNING  Goal: Discharge to home or other facility with appropriate resources  Description: INTERVENTIONS:  - Identify barriers to discharge w/patient and caregiver  - Arrange for needed discharge resources and transportation as appropriate  - Identify discharge learning needs (meds, wound care, etc )  - Arrange for interpretive services to assist at discharge as needed  - Refer to Case Management Department for coordinating discharge planning if the patient needs post-hospital services based on physician/advanced practitioner order or complex needs related to functional status, cognitive ability, or social support system  Outcome: Progressing     Problem: Knowledge Deficit  Goal: Patient/family/caregiver demonstrates understanding of disease process, treatment plan, medications, and discharge instructions  Description: Complete learning assessment and assess knowledge base    Interventions:  - Provide teaching at level of understanding  - Provide teaching via preferred learning methods  Outcome: Progressing

## 2020-08-11 NOTE — PROGRESS NOTES
Assessment/Plan:    Hyperlipidemia  Hyperlipidemia, stable  Hypertension  Hypertension, slightly higher than desirable  This could be related to an element of anxiety  We will continue present medications  CAD (coronary artery disease)  History of coronary artery disease with no symptoms of angina or signs of heart failure  Chronic obstructive pulmonary disease (HCC)  History of chronic obstructive lung disease with periods of shortness of breath associated with occasional use of rescue inhalers  Diagnoses and all orders for this visit:    Coronary artery disease involving native coronary artery of native heart without angina pectoris    Essential hypertension    Mixed hyperlipidemia    Chronic obstructive pulmonary disease, unspecified COPD type (HCC)    Anxiety disorder, unspecified type          Subjective:  Somewhat anxious some shortness of breath     Patient ID: Marcelo Zhang is a 71 y o  male  The patient presented to this office for the purpose of cardiac follow-up  He has a known history of coronary artery disease with hypertension hyperlipidemia  He also has a history of chronic obstructive lung disease  He has symptoms of dyspnea on exertion requiring occasional use of rescue inhalers  Otherwise he denies any symptoms of chest pain, palpitation, dizziness or lightheadedness  He has no leg edema  The following portions of the patient's history were reviewed and updated as appropriate: allergies, current medications, past family history, past medical history, past social history, past surgical history and problem list     Review of Systems   Respiratory: Positive for shortness of breath  Negative for apnea, cough, chest tightness and wheezing  Cardiovascular: Negative for chest pain, palpitations and leg swelling  Gastrointestinal: Negative for abdominal pain  Neurological: Negative for dizziness and light-headedness     Psychiatric/Behavioral: The patient is nervous/anxious  Objective:  Stable cardiac-wise  /86 (BP Location: Left arm, Patient Position: Sitting, Cuff Size: Adult)   Pulse 84   Ht 6' 1" (1 854 m)   Wt 108 kg (237 lb)   SpO2 99%   BMI 31 27 kg/m²          Physical Exam  Vitals signs reviewed  Constitutional:       General: He is not in acute distress  Appearance: He is well-developed  He is not diaphoretic  HENT:      Head: Normocephalic  Eyes:      Pupils: Pupils are equal, round, and reactive to light  Neck:      Musculoskeletal: Normal range of motion  Thyroid: No thyromegaly  Vascular: No JVD  Cardiovascular:      Rate and Rhythm: Normal rate and regular rhythm  Heart sounds: S1 normal and S2 normal  Murmur present  Crescendo  systolic murmur present with a grade of 1/6  No friction rub  No gallop  Pulmonary:      Effort: Pulmonary effort is normal  No respiratory distress  Breath sounds: Normal breath sounds  No wheezing or rales  Chest:      Chest wall: No tenderness  Abdominal:      Palpations: Abdomen is soft  Musculoskeletal: Normal range of motion  General: No tenderness or deformity  Right lower leg: No edema  Left lower leg: No edema  Skin:     General: Skin is warm and dry  Neurological:      Mental Status: He is alert and oriented to person, place, and time     Psychiatric:         Mood and Affect: Mood normal          Behavior: Behavior normal

## 2020-08-11 NOTE — ASSESSMENT & PLAN NOTE
History of chronic obstructive lung disease with periods of shortness of breath associated with occasional use of rescue inhalers

## 2020-08-11 NOTE — PROGRESS NOTES
Patient to the unit for Gazyva infusion  Tolerated infusion without adverse reaction  Declined AVS, aware of future appointments  Voices no questions or concerns at discharge

## 2020-08-11 NOTE — LETTER
August 11, 2020     Clay Adams MD  [address]  [place] [address]    Patient: Zula Dance   YOB: 1955   Date of Visit: 8/11/2020       Dear Dr Amy Shell: Thank you for referring Mello Sousa to me for evaluation  Below are my notes for this consultation  If you have questions, please do not hesitate to call me  I look forward to following your patient along with you  Sincerely,        Rehana Nunez MD        CC: No Recipients  Rehana Nunez MD  8/11/2020  2:33 PM  Sign when Signing Visit  Assessment/Plan:    Hyperlipidemia  Hyperlipidemia, stable  Hypertension  Hypertension, slightly higher than desirable  This could be related to an element of anxiety  We will continue present medications  CAD (coronary artery disease)  History of coronary artery disease with no symptoms of angina or signs of heart failure  Chronic obstructive pulmonary disease (HCC)  History of chronic obstructive lung disease with periods of shortness of breath associated with occasional use of rescue inhalers  Diagnoses and all orders for this visit:    Coronary artery disease involving native coronary artery of native heart without angina pectoris    Essential hypertension    Mixed hyperlipidemia    Chronic obstructive pulmonary disease, unspecified COPD type (HCC)    Anxiety disorder, unspecified type          Subjective:  Somewhat anxious some shortness of breath     Patient ID: Zula Dance is a 71 y o  male  The patient presented to this office for the purpose of cardiac follow-up  He has a known history of coronary artery disease with hypertension hyperlipidemia  He also has a history of chronic obstructive lung disease  He has symptoms of dyspnea on exertion requiring occasional use of rescue inhalers  Otherwise he denies any symptoms of chest pain, palpitation, dizziness or lightheadedness  He has no leg edema        The following portions of the patient's history were reviewed and updated as appropriate: allergies, current medications, past family history, past medical history, past social history, past surgical history and problem list     Review of Systems   Respiratory: Positive for shortness of breath  Negative for apnea, cough, chest tightness and wheezing  Cardiovascular: Negative for chest pain, palpitations and leg swelling  Gastrointestinal: Negative for abdominal pain  Neurological: Negative for dizziness and light-headedness  Psychiatric/Behavioral: The patient is nervous/anxious  Objective:  Stable cardiac-wise  /86 (BP Location: Left arm, Patient Position: Sitting, Cuff Size: Adult)   Pulse 84   Ht 6' 1" (1 854 m)   Wt 108 kg (237 lb)   SpO2 99%   BMI 31 27 kg/m²          Physical Exam  Vitals signs reviewed  Constitutional:       General: He is not in acute distress  Appearance: He is well-developed  He is not diaphoretic  HENT:      Head: Normocephalic  Eyes:      Pupils: Pupils are equal, round, and reactive to light  Neck:      Musculoskeletal: Normal range of motion  Thyroid: No thyromegaly  Vascular: No JVD  Cardiovascular:      Rate and Rhythm: Normal rate and regular rhythm  Heart sounds: S1 normal and S2 normal  Murmur present  Crescendo  systolic murmur present with a grade of 1/6  No friction rub  No gallop  Pulmonary:      Effort: Pulmonary effort is normal  No respiratory distress  Breath sounds: Normal breath sounds  No wheezing or rales  Chest:      Chest wall: No tenderness  Abdominal:      Palpations: Abdomen is soft  Musculoskeletal: Normal range of motion  General: No tenderness or deformity  Right lower leg: No edema  Left lower leg: No edema  Skin:     General: Skin is warm and dry  Neurological:      Mental Status: He is alert and oriented to person, place, and time     Psychiatric:         Mood and Affect: Mood normal          Behavior: Behavior normal

## 2020-08-12 ENCOUNTER — TELEPHONE (OUTPATIENT)
Dept: FAMILY MEDICINE CLINIC | Facility: CLINIC | Age: 65
End: 2020-08-12

## 2020-08-12 DIAGNOSIS — F41.9 ANXIETY DISORDER, UNSPECIFIED TYPE: Primary | ICD-10-CM

## 2020-08-12 RX ORDER — LORAZEPAM 0.5 MG/1
0.5 TABLET ORAL DAILY PRN
Qty: 30 TABLET | Refills: 0 | Status: SHIPPED | OUTPATIENT
Start: 2020-08-12 | End: 2021-03-06 | Stop reason: SDUPTHER

## 2020-08-12 RX ORDER — NALOXONE HYDROCHLORIDE 4 MG/.1ML
SPRAY NASAL
Qty: 1 EACH | Refills: 1 | Status: SHIPPED | OUTPATIENT
Start: 2020-08-12 | End: 2021-04-21

## 2020-08-12 NOTE — TELEPHONE ENCOUNTER
Patient made aware Narcan sent in and what it is for    He said thank you!            ----- Message from Ysabel King MD sent at 8/12/2020 12:23 PM EDT -----  Let the patient know that I have called in Narcan as well in case of excess sedation according to [facility] recommendation

## 2020-08-14 ENCOUNTER — OFFICE VISIT (OUTPATIENT)
Dept: OBGYN CLINIC | Facility: MEDICAL CENTER | Age: 65
End: 2020-08-14
Payer: MEDICARE

## 2020-08-14 VITALS
TEMPERATURE: 97.8 F | WEIGHT: 241 LBS | DIASTOLIC BLOOD PRESSURE: 85 MMHG | HEART RATE: 69 BPM | HEIGHT: 73 IN | SYSTOLIC BLOOD PRESSURE: 157 MMHG | BODY MASS INDEX: 31.94 KG/M2

## 2020-08-14 DIAGNOSIS — M75.51 BURSITIS OF RIGHT SHOULDER: Primary | ICD-10-CM

## 2020-08-14 PROCEDURE — 4040F PNEUMOC VAC/ADMIN/RCVD: CPT | Performed by: ORTHOPAEDIC SURGERY

## 2020-08-14 PROCEDURE — 2022F DILAT RTA XM EVC RTNOPTHY: CPT | Performed by: ORTHOPAEDIC SURGERY

## 2020-08-14 PROCEDURE — 3008F BODY MASS INDEX DOCD: CPT | Performed by: INTERNAL MEDICINE

## 2020-08-14 PROCEDURE — 3008F BODY MASS INDEX DOCD: CPT | Performed by: ORTHOPAEDIC SURGERY

## 2020-08-14 PROCEDURE — 99213 OFFICE O/P EST LOW 20 MIN: CPT | Performed by: ORTHOPAEDIC SURGERY

## 2020-08-14 PROCEDURE — 1036F TOBACCO NON-USER: CPT | Performed by: ORTHOPAEDIC SURGERY

## 2020-08-14 NOTE — PROGRESS NOTES
Orthopaedic Surgery - Office Note  Jaymie Harry (33 y o  male)   : 1955   MRN: [id number]  Encounter Date: 2020    Chief Complaint   Patient presents with    Right Shoulder - Follow-up       Assessment / Plan  Right shoulder bursitis    · Activity as tolerated  · Home exercise program reviewed  · Anti-inflammatories or Tylenol prn pain  · Continue using ice for pain  Return if symptoms worsen or fail to improve  History of Present Illness  Jaymie Harry is a 71 y o  RHD male who presents for follow-up evaluation of right shoulder  States after his last appointment he received 1 week of 100% pain relief after the cortisone injection  At this point he is feeling 75% to 80% better and his last appointment  States he is experiencing a 2/10 pain described as a annoying feeling  His pain is intermittent and is exacerbated by activities  He is still able to participate in his activities of daily living and work duties such as [employer], 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  He denies any further injury or trauma to his right arm  He denies any distal paresthesias  Review of Systems  Pertinent items are noted in HPI  All other systems were reviewed and are negative  Physical Exam  /85   Pulse 69   Temp 97 8 °F (36 6 °C)   Ht 6' 1" (1 854 m)   Wt 109 kg (241 lb)   BMI 31 80 kg/m²   Cons: Appears well  No apparent distress  Psych: Alert  Oriented x3  Mood and affect normal   Eyes: PERRLA, EOMI  Resp: Normal effort  No audible wheezing or stridor  CV: Palpable pulse  No discernable arrhythmia  No LE edema  Lymph:  No palpable cervical, axillary, or inguinal lymphadenopathy  Skin: Warm  No palpable masses  No visible lesions  Neuro: Normal muscle tone  Normal and symmetric DTR's  Right Shoulder Exam  Alignment / Posture:  Normal shoulder posture  Inspection:  No swelling  Palpation:  No tenderness  ROM:  Shoulder   Shoulder ER 60   Shoulder IR T12   Strength:  5/5 supraspinatus, infraspinatus, and subscapularis  Stability:  No objective shoulder instability  Tests: (+) Neer  (-) Carlton Morris  (-) Belly press  (-) Speed  (-) [place]  Neurovascular:  Sensation intact in Ax/R/M/U nerve distributions  2+ radial pulse  Studies Reviewed  MRI of right shoulder - I personally reviewed the MRI from 11/13/2018 which demonstrated mild subacromial bursitis    Procedures  No procedures today  Medical, Surgical, Family, and Social History  The patient's medical history, family history, and social history, were reviewed and updated as appropriate  Past Medical History:   Diagnosis Date    Anemia     Arthritis     CAD (coronary artery disease) 2004    Cellulitis of scalp     CKD (chronic kidney disease) stage 3, GFR 30-59 ml/min (HCC)     CLL (chronic lymphocytic leukemia) (HCC)     COPD (chronic obstructive pulmonary disease) ([address] )     Diabetes mellitus ([address] )     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    Dyslipidemia     Edema extremities     Esophageal ulcer     H/O blood clots     Hemolytic anemia (HCC)     Hiatal hernia     History of TIA (transient ischemic attack)     Hyperlipidemia     Hypertension     Listeria infection 2018    Multiple gastric ulcers     Myocardial infarction ([address] ) 2004    acute    Neutropenia (HCC)     Obese     Recurrent sinusitis     Sleep apnea     Thrombocytopenia (HCC)     Vertigo        Past Surgical History:   Procedure Laterality Date    ARTHROSCOPIC REPAIR ACL      lt knee    CARDIAC SURGERY      cardiac cath with stent    FOOT SURGERY      IR PORTACATHETERGRAM  5/17/2019    KNEE ARTHROSCOPY      OTHER SURGICAL HISTORY      cardiac cath lesion 1, 1st adjunct treat device: stent    PORTACATH PLACEMENT      [place] ESOPHAGOGASTRODUODENOSCOPY TRANSORAL DIAGNOSTIC N/A 10/5/2017    Procedure: ESOPHAGOGASTRODUODENOSCOPY (EGD) with bx;  Surgeon: Diogenes Pichardo DO;  Location: [facility];   Service: Gastroenterology    [place] ESOPHAGOGASTRODUODENOSCOPY TRANSORAL DIAGNOSTIC N/A 3/8/2018    Procedure: ESOPHAGOGASTRODUODENOSCOPY (EGD) with biopsy;  Surgeon: Salud Hopkins DO;  Location: [facility]; Service: Gastroenterology    [place] ESOPHAGOGASTRODUODENOSCOPY TRANSORAL DIAGNOSTIC N/A 2018    Procedure: ESOPHAGOGASTRODUODENOSCOPY (EGD) with Dilation;  Surgeon: Salud Hopkins DO;  [facility]: [facility]; Service: Gastroenterology    [place] ESOPHAGOGASTRODUODENOSCOPY TRANSORAL DIAGNOSTIC N/A 10/18/2018    Procedure: ESOPHAGOGASTRODUODENOSCOPY (EGD) with dilation;  Surgeon: Priscilla Serrano MD;  Location: [facility]; Service: Gastroenterology    [place] ESOPHAGOSCOPY FLEXIBLE TRANSORAL WITH BIOPSY N/A 2016    Procedure: ESOPHAGOGASTRODUODENOSCOPY (EGD); ESOPHAGEAL DILATION; ESOPHAGEAL BIOPSY;  Surgeon: Buddy De La Garza MD;  [facility];  Service: Thoracic    TONSILLECTOMY      UPPER GASTROINTESTINAL ENDOSCOPY      x 6       Family History   Problem Relation Age of Onset    Leukemia Mother         chronic    Colon polyps Mother         sidmoid    Parkinsonism Father        Social History     Occupational History    Not on file   Tobacco Use    Smoking status: Former Smoker     Packs/day: 1 00     Years: 10 00     Pack years: 10 00     Types: Cigarettes     Last attempt to quit: 2015     Years since quittin 4    Smokeless tobacco: Never Used   Substance and Sexual Activity    Alcohol use:  Yes     Alcohol/week: 2 0 standard drinks     Types: 2 Cans of beer per week     Comment: social use as per [employer]    Drug use: No    Sexual activity: Not on file       Allergies   Allergen Reactions    Heparin Other (See Comments)     Other reaction(s): Blood Disorders  clot    Macrolides And Ketolides Other (See Comments)     Interacts with other meds he is taking         Current Outpatient Medications:     acyclovir (ZOVIRAX) 400 MG tablet, Take 1 tablet (400 mg total) by mouth 2 (two) times a day, Disp: 60 tablet, Rfl: 5    amLODIPine (NORVASC) 10 mg tablet, Take 1 tablet (10 mg total) by mouth daily, Disp: 90 tablet, Rfl: 3    aspirin 81 MG tablet, Take 81 mg by mouth every other day Every other day , Disp: , Rfl:     citalopram (CeleXA) 40 mg tablet, TAKE 1 TABLET DAILY, Disp: 90 tablet, Rfl: 5    fenofibrate (TRICOR) 145 mg tablet, TAKE 1 TABLET DAILY, Disp: 90 tablet, Rfl: 5    folic acid (FOLVITE) 1 mg tablet, Take 400 mcg by mouth daily , Disp: , Rfl:     furosemide (LASIX) 40 mg tablet, Take 1 tablet (40 mg total) by mouth daily, Disp: 90 tablet, Rfl: 3    gabapentin (NEURONTIN) 600 MG tablet, TAKE 1 TABLET (600 MG TOTAL) BY MOUTH DAILY, Disp: 30 tablet, Rfl: 2    gabapentin (NEURONTIN) 600 MG tablet, TAKE 1 TABLET DAILY, Disp: 90 tablet, Rfl: 5    glucose monitoring kit (FREESTYLE) monitoring kit, 1 each by Does not apply route as needed ( ) E 11 9, Disp: 1 each, Rfl: 0    HUMALOG KWIKPEN 100 units/mL injection pen, USE 5-10 UNITS BEFORE MEALS AS NEEDED FOR STEROID INDUCED HYPERGLYCEMIA, Disp: 15 mL, Rfl: 1    insulin lispro (HUMALOG KWIKPEN) 100 units/mL injection pen, Use 5-10 units before meals as needed for steroid induced hyperglycemia, Disp: 5 pen, Rfl: 1    Insulin Syringe-Needle U-100 30G X 1/2" 0 3 ML MISC, by Does not apply route 2 (two) times a day, Disp: 100 each, Rfl: 6    Lancets (FREESTYLE) lancets, Use as instructed--test 2 times a day  E 11 9, Disp: 100 each, Rfl: 0    LORazepam (ATIVAN) 0 5 mg tablet, Take 1 tablet (0 5 mg total) by mouth daily as needed for anxiety, Disp: 30 tablet, Rfl: 0    losartan (COZAAR) 100 MG tablet, Take 1 tablet (100 mg total) by mouth daily, Disp: 90 tablet, Rfl: 1    multivitamin (THERAGRAN) TABS, Take 1 tablet by mouth daily, Disp: , Rfl:     naloxone (NARCAN) 4 mg/0 1 mL nasal spray, Administer 1 spray into a nostril   If breathing does not return to normal or if breathing difficulty resumes after 2-3 minutes, give another dose in the other nostril using a new spray , Disp: 1 each, Rfl: 1    obinutuzumab (GAZYVA) 1000 mg/40 mL SOLN, Infuse into a venous catheter, Disp: , Rfl:     oxyCODONE (ROXICODONE) 10 MG TABS, Take 1 tablet (10 mg total) by mouth every 6 (six) hours as needed for moderate pain For ongoing pain controlMax Daily Amount: 40 mg, Disp: 90 tablet, Rfl: 0    pantoprazole (PROTONIX) 40 mg tablet, Take 1 tablet (40 mg total) by mouth daily, Disp: 90 tablet, Rfl: 3    Potassium (POTASSIMIN PO), Take 20 mEq by mouth daily, Disp: , Rfl:     predniSONE 5 mg tablet, Take 1 tablet (5 mg total) by mouth daily, Disp: 90 tablet, Rfl: 2    sodium chloride, PF, 0 9 %, 10 mL by Intracatheter route see administration instructions 10mL into port before and after ampicillin doses, Disp: , Rfl: 0    VENTOLIN  (90 Base) MCG/ACT inhaler, Inhale 2 puffs 4 (four) times a day as needed for wheezing, Disp: 2 Inhaler, Rfl: 3    zolpidem (AMBIEN) 5 mg tablet, Take 1 tablet (5 mg total) by mouth daily at bedtime as needed for sleep, Disp: 30 tablet, Rfl: 0    Ibrutinib 140 MG TABS, Take 140 mg by mouth daily, Disp: 30 tablet, Rfl: 11      Ana Luisa Palangio    Scribe Attestation    I,:   Alie Lovett am acting as a scribe while in the presence of the attending physician :        I,:   Melida Thornton MD personally performed the services described in this documentation    as scribed in my presence :

## 2020-08-25 ENCOUNTER — HOSPITAL ENCOUNTER (OUTPATIENT)
Dept: INFUSION CENTER | Facility: CLINIC | Age: 65
Discharge: HOME | End: 2020-08-25
Payer: MEDICARE

## 2020-08-25 VITALS
SYSTOLIC BLOOD PRESSURE: 145 MMHG | WEIGHT: 234.79 LBS | RESPIRATION RATE: 18 BRPM | TEMPERATURE: 98.6 F | HEART RATE: 78 BPM | DIASTOLIC BLOOD PRESSURE: 85 MMHG | BODY MASS INDEX: 30.98 KG/M2

## 2020-08-25 DIAGNOSIS — C91.10 CLL (CHRONIC LYMPHOCYTIC LEUKEMIA) (HCC): Primary | ICD-10-CM

## 2020-08-25 DIAGNOSIS — D80.1 HYPOGAMMAGLOBULINEMIA, ACQUIRED (HCC): ICD-10-CM

## 2020-08-25 PROCEDURE — 96375 TX/PRO/DX INJ NEW DRUG ADDON: CPT

## 2020-08-25 PROCEDURE — 96367 TX/PROPH/DG ADDL SEQ IV INF: CPT

## 2020-08-25 PROCEDURE — 96366 THER/PROPH/DIAG IV INF ADDON: CPT

## 2020-08-25 PROCEDURE — 96365 THER/PROPH/DIAG IV INF INIT: CPT

## 2020-08-25 RX ORDER — SODIUM CHLORIDE 9 MG/ML
20 INJECTION, SOLUTION INTRAVENOUS ONCE
Status: CANCELLED | OUTPATIENT
Start: 2020-09-22

## 2020-08-25 RX ORDER — ACETAMINOPHEN 325 MG/1
650 TABLET ORAL ONCE
Status: CANCELLED | OUTPATIENT
Start: 2020-09-22

## 2020-08-25 RX ORDER — SODIUM CHLORIDE 9 MG/ML
20 INJECTION, SOLUTION INTRAVENOUS ONCE
Status: COMPLETED | OUTPATIENT
Start: 2020-08-25 | End: 2020-08-25

## 2020-08-25 RX ORDER — ACETAMINOPHEN 325 MG/1
650 TABLET ORAL ONCE
Status: COMPLETED | OUTPATIENT
Start: 2020-08-25 | End: 2020-08-25

## 2020-08-25 RX ADMIN — HYDROCORTISONE SODIUM SUCCINATE 100 MG: 100 INJECTION, POWDER, FOR SOLUTION INTRAMUSCULAR; INTRAVENOUS at 08:31

## 2020-08-25 RX ADMIN — Medication 22.5 G: at 08:53

## 2020-08-25 RX ADMIN — DIPHENHYDRAMINE HYDROCHLORIDE 12.5 MG: 50 INJECTION, SOLUTION INTRAMUSCULAR; INTRAVENOUS at 08:32

## 2020-08-25 RX ADMIN — ACETAMINOPHEN 650 MG: 325 TABLET, FILM COATED ORAL at 08:25

## 2020-08-25 RX ADMIN — SODIUM CHLORIDE 20 ML/HR: 0.9 INJECTION, SOLUTION INTRAVENOUS at 08:25

## 2020-08-25 NOTE — PLAN OF CARE
Problem: Potential for Falls  Goal: Patient will remain free of falls  Description: INTERVENTIONS:  - Assess patient frequently for physical needs  -  Identify cognitive and physical deficits and behaviors that affect risk of falls    -  [place] fall precautions as indicated by assessment   - Educate patient/family on patient safety including physical limitations  - Instruct patient to call for assistance with activity based on assessment  - Modify environment to reduce risk of injury  - Consider OT/PT consult to assist with strengthening/mobility  Outcome: Progressing

## 2020-08-28 DIAGNOSIS — I10 ESSENTIAL HYPERTENSION: ICD-10-CM

## 2020-08-28 RX ORDER — LOSARTAN POTASSIUM 100 MG/1
100 TABLET ORAL DAILY
Qty: 90 TABLET | Refills: 1 | Status: SHIPPED | OUTPATIENT
Start: 2020-08-28 | End: 2021-03-24

## 2020-08-28 RX ORDER — LOSARTAN POTASSIUM 100 MG/1
TABLET ORAL
Qty: 90 TABLET | Refills: 5 | OUTPATIENT
Start: 2020-08-28

## 2020-08-29 PROCEDURE — 4010F ACE/ARB THERAPY RXD/TAKEN: CPT | Performed by: INTERNAL MEDICINE

## 2020-08-29 RX ORDER — LOSARTAN POTASSIUM 50 MG/1
TABLET ORAL
Qty: 90 TABLET | Refills: 5 | Status: SHIPPED | OUTPATIENT
Start: 2020-08-29 | End: 2021-01-20 | Stop reason: DRUGHIGH

## 2020-09-04 RX ORDER — SODIUM CHLORIDE 9 MG/ML
20 INJECTION, SOLUTION INTRAVENOUS ONCE
Status: CANCELLED | OUTPATIENT
Start: 2020-09-09

## 2020-09-04 RX ORDER — ACETAMINOPHEN 325 MG/1
650 TABLET ORAL ONCE
Status: CANCELLED | OUTPATIENT
Start: 2020-09-09

## 2020-09-07 ENCOUNTER — ANESTHESIA EVENT (OUTPATIENT)
Dept: GASTROENTEROLOGY | Facility: HOSPITAL | Age: 65
End: 2020-09-07

## 2020-09-07 RX ORDER — SODIUM CHLORIDE 9 MG/ML
125 INJECTION, SOLUTION INTRAVENOUS CONTINUOUS
Status: CANCELLED | OUTPATIENT
Start: 2020-09-07

## 2020-09-08 ENCOUNTER — ANESTHESIA (OUTPATIENT)
Dept: GASTROENTEROLOGY | Facility: HOSPITAL | Age: 65
End: 2020-09-08

## 2020-09-08 ENCOUNTER — HOSPITAL ENCOUNTER (OUTPATIENT)
Dept: GASTROENTEROLOGY | Facility: HOSPITAL | Age: 65
Setting detail: OUTPATIENT SURGERY
Discharge: HOME | End: 2020-09-08
Attending: INTERNAL MEDICINE | Admitting: INTERNAL MEDICINE
Payer: MEDICARE

## 2020-09-08 VITALS
WEIGHT: 234 LBS | BODY MASS INDEX: 31.01 KG/M2 | HEIGHT: 73 IN | RESPIRATION RATE: 18 BRPM | TEMPERATURE: 98 F | OXYGEN SATURATION: 96 % | SYSTOLIC BLOOD PRESSURE: 133 MMHG | HEART RATE: 84 BPM | DIASTOLIC BLOOD PRESSURE: 73 MMHG

## 2020-09-08 VITALS — HEART RATE: 76 BPM

## 2020-09-08 DIAGNOSIS — R13.19 ESOPHAGEAL DYSPHAGIA: ICD-10-CM

## 2020-09-08 LAB — GLUCOSE SERPL-MCNC: 148 MG/DL (ref 65–140)

## 2020-09-08 PROCEDURE — 88312 SPECIAL STAINS GROUP 1: CPT | Performed by: PATHOLOGY

## 2020-09-08 PROCEDURE — 88341 IMHCHEM/IMCYTCHM EA ADD ANTB: CPT | Performed by: PATHOLOGY

## 2020-09-08 PROCEDURE — 43239 EGD BIOPSY SINGLE/MULTIPLE: CPT | Performed by: INTERNAL MEDICINE

## 2020-09-08 PROCEDURE — 43248 EGD GUIDE WIRE INSERTION: CPT | Performed by: INTERNAL MEDICINE

## 2020-09-08 PROCEDURE — 82948 REAGENT STRIP/BLOOD GLUCOSE: CPT

## 2020-09-08 PROCEDURE — C1726 CATH, BAL DIL, NON-VASCULAR: HCPCS

## 2020-09-08 PROCEDURE — 88305 TISSUE EXAM BY PATHOLOGIST: CPT | Performed by: PATHOLOGY

## 2020-09-08 PROCEDURE — 88342 IMHCHEM/IMCYTCHM 1ST ANTB: CPT | Performed by: PATHOLOGY

## 2020-09-08 RX ORDER — LIDOCAINE HYDROCHLORIDE 20 MG/ML
INJECTION, SOLUTION EPIDURAL; INFILTRATION; INTRACAUDAL; PERINEURAL AS NEEDED
Status: DISCONTINUED | OUTPATIENT
Start: 2020-09-08 | End: 2020-09-08

## 2020-09-08 RX ORDER — SODIUM CHLORIDE 9 MG/ML
INJECTION, SOLUTION INTRAVENOUS CONTINUOUS PRN
Status: DISCONTINUED | OUTPATIENT
Start: 2020-09-08 | End: 2020-09-08

## 2020-09-08 RX ORDER — KETAMINE HCL IN NACL, ISO-OSM 100MG/10ML
SYRINGE (ML) INJECTION AS NEEDED
Status: DISCONTINUED | OUTPATIENT
Start: 2020-09-08 | End: 2020-09-08

## 2020-09-08 RX ORDER — GLYCOPYRROLATE 0.2 MG/ML
INJECTION INTRAMUSCULAR; INTRAVENOUS AS NEEDED
Status: DISCONTINUED | OUTPATIENT
Start: 2020-09-08 | End: 2020-09-08

## 2020-09-08 RX ORDER — PROPOFOL 10 MG/ML
INJECTION, EMULSION INTRAVENOUS AS NEEDED
Status: DISCONTINUED | OUTPATIENT
Start: 2020-09-08 | End: 2020-09-08

## 2020-09-08 RX ADMIN — PROPOFOL 50 MG: 10 INJECTION, EMULSION INTRAVENOUS at 13:24

## 2020-09-08 RX ADMIN — Medication 10 MG: at 13:30

## 2020-09-08 RX ADMIN — PROPOFOL 50 MG: 10 INJECTION, EMULSION INTRAVENOUS at 13:27

## 2020-09-08 RX ADMIN — PROPOFOL 50 MG: 10 INJECTION, EMULSION INTRAVENOUS at 13:35

## 2020-09-08 RX ADMIN — PROPOFOL 50 MG: 10 INJECTION, EMULSION INTRAVENOUS at 13:30

## 2020-09-08 RX ADMIN — LIDOCAINE HYDROCHLORIDE 80 MG: 20 INJECTION, SOLUTION EPIDURAL; INFILTRATION; INTRACAUDAL; PERINEURAL at 13:24

## 2020-09-08 RX ADMIN — Medication 10 MG: at 13:24

## 2020-09-08 RX ADMIN — SODIUM CHLORIDE: 0.9 INJECTION, SOLUTION INTRAVENOUS at 13:13

## 2020-09-08 RX ADMIN — Medication 10 MG: at 13:28

## 2020-09-08 RX ADMIN — GLYCOPYRROLATE 0.2 MG: 0.2 INJECTION, SOLUTION INTRAMUSCULAR; INTRAVENOUS at 13:24

## 2020-09-08 RX ADMIN — Medication 10 MG: at 13:33

## 2020-09-08 NOTE — ANESTHESIA POSTPROCEDURE EVALUATION
Post-Op Assessment Note    CV Status:  Stable  Pain Score: 0    Pain management: adequate     Mental Status:  Alert and awake   Hydration Status:  Euvolemic   PONV Controlled:  Controlled   Airway Patency:  Patent      Post Op Vitals Reviewed: Yes      Staff: CRNA         No complications documented      /74 (09/08/20 1343)    Temp      Pulse 80 (09/08/20 1343)   Resp 18 (09/08/20 1343)    SpO2 98 % (09/08/20 1343)

## 2020-09-08 NOTE — ANESTHESIA PREPROCEDURE EVALUATION
Review of Systems/Medical History  Patient summary reviewed  Chart reviewed  No history of anesthetic complications     Cardiovascular  EKG reviewed , Exercise tolerance (METS): <4 ,  Hyperlipidemia, Hypertension , Past MI (2004) > 6 months, CAD , No DVT  Comment: TTE SUMMARY (2018):  No valvular vegetation seen  AV sclerosis w/o stenosis      LEFT VENTRICLE:  Systolic function was normal  Ejection fraction was estimated to be 60 %  There were no regional wall motion abnormalities  Wall thickness was mildly increased      LEFT ATRIUM:  The atrium was mildly dilated      MITRAL VALVE:  There was trace regurgitation      TRICUSPID VALVE:  There was trace regurgitation  ,  Pulmonary  Smoker ex-smoker  , COPD mild- PRN medication , Sleep apnea ,        GI/Hepatic  Dysphagia, Dysphagia type: solids  GERD , Esophageal disease benign esophageal stricture,  Hiatal hernia,        Chronic kidney disease stage 3,        Endo/Other  Diabetes (associated with chronic steroids) type 2 Insulin,   Comment: Chronic prednisone 5mg daily Obesity    GYN       Hematology    Immunocompromised state leukemia, Lymphoma: CLL  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 Musculoskeletal    Arthritis     Neurology    TIA,   Comment: Chronic opioids, gabapentin Psychology   Negative psychology ROS Psychiatric history,          EKG 4/23/18: NSR, LAD, poor r-wave progression    TTE 4/27/18: LVEF 60%, no rWMA, g1 DD, trace MR, trace TR    CT c/a/p 6/27/19:   - No evidence of metastatic disease within the chest abdomen and pelvis  Interval resolution of previously noted splenomegaly   - Hepatic steatosis  - Lungs are clear  There is no tracheal or endobronchial lesion      Recent Results (from the past 672 hour(s))   CBC and differential    Collection Time: 08/11/20  8:25 AM   Result Value Ref Range    WBC 4 47 4 31 - 10 16 Thousand/uL    RBC 4 61 3 88 - 5 62 Million/uL    Hemoglobin 14 2 12 0 - 17 0 g/dL    Hematocrit 41 5 36 5 - 49 3 %    MCV 90 82 - 98 fL    MCH 30 8 26 8 - 34 3 pg    MCHC 34 2 31 4 - 37 4 g/dL    RDW 13 7 11 6 - 15 1 %    MPV 11 1 8 9 - 12 7 fL    Platelets 993 (L) 802 - 390 Thousands/uL   Manual Differential(PHLEBS Do Not Order)    Collection Time: 08/11/20  8:25 AM   Result Value Ref Range    Segmented % 68 43 - 75 %    Bands % 1 0 - 8 %    Lymphocytes % 16 14 - 44 %    Monocytes % 8 4 - 12 %    Eosinophils, % 6 0 - 6 %    Basophils % 0 0 - 1 %    Metamyelocytes% 1 0 - 1 %    Absolute Neutrophils 3 08 1 85 - 7 62 Thousand/uL    Lymphocytes Absolute 0 72 0 60 - 4 47 Thousand/uL    Monocytes Absolute 0 36 0 00 - 1 22 Thousand/uL    Eosinophils Absolute 0 27 0 00 - 0 40 Thousand/uL    Basophils Absolute 0 00 0 00 - 0 10 Thousand/uL    Total Counted 100     RBC Morphology Normal     Platelet Estimate Borderline (A) Adequate   Comprehensive metabolic panel    Collection Time: 08/11/20  8:26 AM   Result Value Ref Range    Sodium 142 136 - 145 mmol/L    Potassium 3 6 3 5 - 5 3 mmol/L    Chloride 106 98 - 108 mmol/L    CO2 26 21 - 32 mmol/L    ANION GAP 10 4 - 13 mmol/L    BUN 20 5 - 25 mg/dL    Creatinine 1 20 0 60 - 1 30 mg/dL    Glucose 168 (H) 65 - 140 mg/dL    Calcium 9 5 8 3 - 10 1 mg/dL    AST 36 5 - 45 U/L    ALT 43 12 - 78 U/L    Alkaline Phosphatase 53 46 - 116 U/L    Total Protein 6 1 (L) 6 4 - 8 2 g/dL    Albumin 3 6 3 5 - 5 7 g/dL    Total Bilirubin 0 60 0 20 - 1 00 mg/dL    eGFR 63 ml/min/1 73sq m       Physical Exam    Airway    Mallampati score: II  TM Distance: >3 FB  Neck ROM: full     Dental   upper dentures and lower dentures,     Cardiovascular  Rhythm: regular, Rate: normal,     Pulmonary  Decreased breath sounds,     Other Findings        Anesthesia Plan  ASA Score- 3     Anesthesia Type- IV sedation with anesthesia with ASA Monitors  Additional Monitors:   Airway Plan:           Plan Factors-Exercise tolerance (METS): <4     Chart reviewed  EKG reviewed  Existing labs reviewed  Patient summary reviewed              Induction- intravenous  Postoperative Plan-     Informed Consent- Anesthetic plan and risks discussed with patient  I personally reviewed this patient with the CRNA  Discussed and agreed on the Anesthesia Plan with the CRNA  Valeriano Castano

## 2020-09-08 NOTE — H&P
History and Physical - [facility]  Mela Metcalf 76 y o  male MRN: [id number]        HPI: Mela Metcalf is a 76y o  year old male who presents for EGD due to known esophageal stricture  REVIEW OF SYSTEMS: Per the HPI, and otherwise unremarkable  Historical Information   Past Medical History:   Diagnosis Date    Anemia     Arthritis     CAD (coronary artery disease) 2004    Cellulitis of scalp     CKD (chronic kidney disease) stage 3, GFR 30-59 ml/min (HCC)     CLL (chronic lymphocytic leukemia) (HCC)     COPD [facility]chronic obstructive [facility] disease) [facility] [place] [address] [facility]     Diabetes mellitus ([facility] [address] [facility]     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    Dyslipidemia     Edema extremities     Esophageal ulcer     H/O blood clots     Hemolytic anemia (HCC)     Hiatal hernia     History of TIA (transient ischemic attack)     Hyperlipidemia     Hypertension     Listeria infection 2018    Multiple gastric ulcers     Myocardial infarction ([facility] [place] [address] [facility] 2004    acute    Neutropenia (HCC)     Obese     Recurrent sinusitis     Sleep apnea     Thrombocytopenia (HCC)     Vertigo      Past Surgical History:   Procedure Laterality Date    ARTHROSCOPIC REPAIR ACL      lt knee    CARDIAC SURGERY      cardiac cath with stent    FOOT SURGERY      IR PORT CHECK  5/17/2019    KNEE ARTHROSCOPY      OTHER SURGICAL HISTORY      cardiac cath lesion 1, 1st adjunct treat device: stent    PORTACATH PLACEMENT      [place] ESOPHAGOGASTRODUODENOSCOPY TRANSORAL DIAGNOSTIC N/A 10/5/2017    Procedure: ESOPHAGOGASTRODUODENOSCOPY (EGD) with bx;  Surgeon: Diogenes Pichardo DO;  [facility]; Service: Gastroenterology    [place] ESOPHAGOGASTRODUODENOSCOPY TRANSORAL DIAGNOSTIC N/A 3/8/2018    Procedure: ESOPHAGOGASTRODUODENOSCOPY (EGD) with biopsy;  Surgeon: Diogenes Pichardo DO;  [facility];   Service: Gastroenterology    IA ESOPHAGOGASTRODUODENOSCOPY TRANSORAL DIAGNOSTIC N/A 6/20/2018    Procedure: ESOPHAGOGASTRODUODENOSCOPY (EGD) with Dilation;  Surgeon: Manoj Bishop DO;  [facility]; Service: Gastroenterology    [place] ESOPHAGOGASTRODUODENOSCOPY TRANSORAL DIAGNOSTIC N/A 10/18/2018    Procedure: ESOPHAGOGASTRODUODENOSCOPY (EGD) with dilation;  Surgeon: Mago Ron MD;  [facility]; Service: Gastroenterology    [place] ESOPHAGOSCOPY FLEXIBLE TRANSORAL WITH BIOPSY N/A 2016    Procedure: ESOPHAGOGASTRODUODENOSCOPY (EGD); ESOPHAGEAL DILATION; ESOPHAGEAL BIOPSY;  Surgeon: Grant Garcia MD;  [facility];  Service: Thoracic    TONSILLECTOMY      UPPER GASTROINTESTINAL ENDOSCOPY      x 6     Social History   Social History     Substance and Sexual Activity   Alcohol Use Yes    Alcohol/week: 2 0 standard drinks    Types: 2 Cans of beer per week    Comment: social use as per [employer]     Social History     Substance and Sexual Activity   Drug Use No     Social History     Tobacco Use   Smoking Status Former Smoker    Packs/day: 1 00    Years: 10 00    Pack years: 10 00    Types: Cigarettes    Last attempt to quit: 2015    Years since quittin 5   Smokeless Tobacco Never Used     Family History   Problem Relation Age of Onset    Leukemia Mother         chronic    Colon polyps Mother         sidmoid    Parkinsonism Father        Meds/Allergies     (Not in a hospital admission)      Allergies   Allergen Reactions    Heparin Other (See Comments)     Other reaction(s): Blood Disorders  clot    Macrolides And Ketolides Other (See Comments)     Interacts with other meds he is taking       Objective     /88   Pulse 73   Temp 98 °F (36 7 °C) (Tympanic)   Resp 18   Ht 6' 1" (1 854 m)   Wt 106 kg (234 lb)   SpO2 95%   BMI 30 87 kg/m²       PHYSICAL EXAM    Gen: NAD  CV: RRR  CHEST: Clear  ABD: soft, NT/ND  EXT: no edema      ASSESSMENT/PLAN:  This is a 76y o  year old male here for EGD, and he is stable and optimized for his procedure

## 2020-09-09 ENCOUNTER — HOSPITAL ENCOUNTER (OUTPATIENT)
Dept: INFUSION CENTER | Facility: CLINIC | Age: 65
Discharge: HOME | End: 2020-09-09
Payer: MEDICARE

## 2020-09-09 VITALS
WEIGHT: 235.45 LBS | HEIGHT: 73 IN | TEMPERATURE: 97.6 F | DIASTOLIC BLOOD PRESSURE: 81 MMHG | SYSTOLIC BLOOD PRESSURE: 136 MMHG | HEART RATE: 65 BPM | RESPIRATION RATE: 18 BRPM | BODY MASS INDEX: 31.21 KG/M2

## 2020-09-09 DIAGNOSIS — C91.10 CLL (CHRONIC LYMPHOCYTIC LEUKEMIA) (HCC): Primary | ICD-10-CM

## 2020-09-09 LAB
ALBUMIN SERPL BCP-MCNC: 3.9 G/DL (ref 3.5–5.7)
ALP SERPL-CCNC: 51 U/L (ref 46–116)
ALT SERPL W P-5'-P-CCNC: 36 U/L (ref 12–78)
ANION GAP SERPL CALCULATED.3IONS-SCNC: 8 MMOL/L (ref 4–13)
AST SERPL W P-5'-P-CCNC: 35 U/L (ref 5–45)
BASOPHILS # BLD MANUAL: 0 THOUSAND/UL (ref 0–0.1)
BASOPHILS NFR MAR MANUAL: 0 % (ref 0–1)
BILIRUB SERPL-MCNC: 0.6 MG/DL (ref 0.2–1)
BUN SERPL-MCNC: 14 MG/DL (ref 5–25)
CALCIUM SERPL-MCNC: 9.7 MG/DL (ref 8.3–10.1)
CHLORIDE SERPL-SCNC: 105 MMOL/L (ref 98–108)
CO2 SERPL-SCNC: 27 MMOL/L (ref 21–32)
CREAT SERPL-MCNC: 0.8 MG/DL (ref 0.6–1.3)
EOSINOPHIL # BLD MANUAL: 0.24 THOUSAND/UL (ref 0–0.4)
EOSINOPHIL NFR BLD MANUAL: 6 % (ref 0–6)
ERYTHROCYTE [DISTWIDTH] IN BLOOD BY AUTOMATED COUNT: 13.7 % (ref 11.6–15.1)
GFR SERPL CREATININE-BSD FRML MDRD: 93 ML/MIN/1.73SQ M
GLUCOSE SERPL-MCNC: 161 MG/DL (ref 65–140)
HCT VFR BLD AUTO: 41.7 % (ref 36.5–49.3)
HGB BLD-MCNC: 14.1 G/DL (ref 12–17)
HGB RETIC QN AUTO: 34.7 PG (ref 30–38.3)
IGG SERPL-MCNC: 535 MG/DL (ref 700–1600)
IMM RETICS NFR: 14.7 % (ref 0–14)
LDH SERPL-CCNC: 235 U/L (ref 81–234)
LG PLATELETS BLD QL SMEAR: PRESENT
LYMPHOCYTES # BLD AUTO: 0.57 THOUSAND/UL (ref 0.6–4.47)
LYMPHOCYTES # BLD AUTO: 14 % (ref 14–44)
MCH RBC QN AUTO: 30.7 PG (ref 26.8–34.3)
MCHC RBC AUTO-ENTMCNC: 33.8 G/DL (ref 31.4–37.4)
MCV RBC AUTO: 91 FL (ref 82–98)
MONOCYTES # BLD AUTO: 0.41 THOUSAND/UL (ref 0–1.22)
MONOCYTES NFR BLD: 10 % (ref 4–12)
NEUTROPHILS # BLD MANUAL: 2.84 THOUSAND/UL (ref 1.85–7.62)
NEUTS BAND NFR BLD MANUAL: 1 % (ref 0–8)
NEUTS SEG NFR BLD AUTO: 69 % (ref 43–75)
OVALOCYTES BLD QL SMEAR: PRESENT
PLATELET # BLD AUTO: 131 THOUSANDS/UL (ref 149–390)
PLATELET BLD QL SMEAR: ABNORMAL
PMV BLD AUTO: 11.6 FL (ref 8.9–12.7)
POTASSIUM SERPL-SCNC: 4 MMOL/L (ref 3.5–5.3)
PROT SERPL-MCNC: 6 G/DL (ref 6.4–8.2)
RBC # BLD AUTO: 4.6 MILLION/UL (ref 3.88–5.62)
RETICS # AUTO: ABNORMAL 10*3/UL (ref 14356–105094)
RETICS # CALC: 2.35 % (ref 0.37–1.87)
SODIUM SERPL-SCNC: 140 MMOL/L (ref 136–145)
TOTAL CELLS COUNTED SPEC: 100
URATE SERPL-MCNC: 4.6 MG/DL (ref 4.2–8)
WBC # BLD AUTO: 4.05 THOUSAND/UL (ref 4.31–10.16)

## 2020-09-09 PROCEDURE — 85027 COMPLETE CBC AUTOMATED: CPT | Performed by: INTERNAL MEDICINE

## 2020-09-09 PROCEDURE — 96413 CHEMO IV INFUSION 1 HR: CPT

## 2020-09-09 PROCEDURE — 85046 RETICYTE/HGB CONCENTRATE: CPT | Performed by: INTERNAL MEDICINE

## 2020-09-09 PROCEDURE — 96367 TX/PROPH/DG ADDL SEQ IV INF: CPT

## 2020-09-09 PROCEDURE — 80053 COMPREHEN METABOLIC PANEL: CPT | Performed by: INTERNAL MEDICINE

## 2020-09-09 PROCEDURE — 84550 ASSAY OF BLOOD/URIC ACID: CPT | Performed by: INTERNAL MEDICINE

## 2020-09-09 PROCEDURE — 83615 LACTATE (LD) (LDH) ENZYME: CPT | Performed by: INTERNAL MEDICINE

## 2020-09-09 PROCEDURE — 85007 BL SMEAR W/DIFF WBC COUNT: CPT | Performed by: INTERNAL MEDICINE

## 2020-09-09 PROCEDURE — 96415 CHEMO IV INFUSION ADDL HR: CPT

## 2020-09-09 PROCEDURE — 82232 ASSAY OF BETA-2 PROTEIN: CPT | Performed by: INTERNAL MEDICINE

## 2020-09-09 PROCEDURE — 82784 ASSAY IGA/IGD/IGG/IGM EACH: CPT | Performed by: INTERNAL MEDICINE

## 2020-09-09 RX ORDER — ACETAMINOPHEN 325 MG/1
650 TABLET ORAL ONCE
Status: COMPLETED | OUTPATIENT
Start: 2020-09-09 | End: 2020-09-09

## 2020-09-09 RX ORDER — SODIUM CHLORIDE 9 MG/ML
20 INJECTION, SOLUTION INTRAVENOUS ONCE
Status: COMPLETED | OUTPATIENT
Start: 2020-09-09 | End: 2020-09-09

## 2020-09-09 RX ADMIN — DEXAMETHASONE SODIUM PHOSPHATE 20 MG: 10 INJECTION, SOLUTION INTRAMUSCULAR; INTRAVENOUS at 08:59

## 2020-09-09 RX ADMIN — SODIUM CHLORIDE 20 ML/HR: 0.9 INJECTION, SOLUTION INTRAVENOUS at 08:59

## 2020-09-09 RX ADMIN — ACETAMINOPHEN 650 MG: 325 TABLET ORAL at 09:25

## 2020-09-09 RX ADMIN — DIPHENHYDRAMINE HYDROCHLORIDE 25 MG: 50 INJECTION, SOLUTION INTRAMUSCULAR; INTRAVENOUS at 09:25

## 2020-09-09 RX ADMIN — OBINUTUZUMAB 1000 MG: 1000 INJECTION, SOLUTION, CONCENTRATE INTRAVENOUS at 10:15

## 2020-09-09 NOTE — SOCIAL WORK
LSW met with pt in the infusion center to make introductions  LSW provided pt with contact information and encourage pt to reach out with questions  Pt was agreeable and stated he is doing well, no current issues

## 2020-09-09 NOTE — PLAN OF CARE
Problem: INFECTION - ADULT  Goal: Absence or prevention of progression during hospitalization  Description: INTERVENTIONS:  - Assess and monitor for signs and symptoms of infection  - Monitor lab/diagnostic results  - Monitor all insertion sites, i e  indwelling lines, tubes, and drains  - Monitor endotracheal if appropriate and nasal secretions for changes in amount and color  - [place] appropriate cooling/warming therapies per order  - Administer medications as ordered  - Instruct and encourage patient and family to use good hand hygiene technique  - Identify and instruct in appropriate isolation precautions for identified infection/condition  Outcome: Progressing

## 2020-09-09 NOTE — PROGRESS NOTES
Patient arrived to the unit and denied infections or complications  He did report that he had his throat stretched and two biopsies were taken yesterday  Dr Carrillo Rodriguez is aware and it is ok to proceed with [facility]

## 2020-09-11 LAB — B2 MICROGLOB SERPL-MCNC: 2.7 MG/L (ref 0.6–2.4)

## 2020-09-22 ENCOUNTER — HOSPITAL ENCOUNTER (OUTPATIENT)
Dept: INFUSION CENTER | Facility: CLINIC | Age: 65
Discharge: HOME | End: 2020-09-22
Payer: MEDICARE

## 2020-09-22 VITALS
DIASTOLIC BLOOD PRESSURE: 81 MMHG | TEMPERATURE: 97.6 F | WEIGHT: 234.57 LBS | SYSTOLIC BLOOD PRESSURE: 146 MMHG | HEART RATE: 73 BPM | BODY MASS INDEX: 30.95 KG/M2 | RESPIRATION RATE: 18 BRPM

## 2020-09-22 DIAGNOSIS — C91.10 CLL (CHRONIC LYMPHOCYTIC LEUKEMIA) (HCC): Primary | ICD-10-CM

## 2020-09-22 DIAGNOSIS — D80.1 HYPOGAMMAGLOBULINEMIA, ACQUIRED (HCC): ICD-10-CM

## 2020-09-22 PROCEDURE — 96375 TX/PRO/DX INJ NEW DRUG ADDON: CPT

## 2020-09-22 PROCEDURE — 96365 THER/PROPH/DIAG IV INF INIT: CPT

## 2020-09-22 PROCEDURE — 96367 TX/PROPH/DG ADDL SEQ IV INF: CPT

## 2020-09-22 PROCEDURE — 96366 THER/PROPH/DIAG IV INF ADDON: CPT

## 2020-09-22 RX ORDER — ACETAMINOPHEN 325 MG/1
650 TABLET ORAL ONCE
Status: CANCELLED | OUTPATIENT
Start: 2020-10-20

## 2020-09-22 RX ORDER — SODIUM CHLORIDE 9 MG/ML
20 INJECTION, SOLUTION INTRAVENOUS ONCE
Status: COMPLETED | OUTPATIENT
Start: 2020-09-22 | End: 2020-09-22

## 2020-09-22 RX ORDER — SODIUM CHLORIDE 9 MG/ML
20 INJECTION, SOLUTION INTRAVENOUS ONCE
Status: CANCELLED | OUTPATIENT
Start: 2020-10-20

## 2020-09-22 RX ORDER — ACETAMINOPHEN 325 MG/1
650 TABLET ORAL ONCE
Status: COMPLETED | OUTPATIENT
Start: 2020-09-22 | End: 2020-09-22

## 2020-09-22 RX ADMIN — Medication 22.5 G: at 08:59

## 2020-09-22 RX ADMIN — HYDROCORTISONE SODIUM SUCCINATE 100 MG: 100 INJECTION, POWDER, FOR SOLUTION INTRAMUSCULAR; INTRAVENOUS at 08:21

## 2020-09-22 RX ADMIN — SODIUM CHLORIDE 20 ML/HR: 0.9 INJECTION, SOLUTION INTRAVENOUS at 08:18

## 2020-09-22 RX ADMIN — DIPHENHYDRAMINE HYDROCHLORIDE 12.5 MG: 50 INJECTION, SOLUTION INTRAMUSCULAR; INTRAVENOUS at 08:25

## 2020-09-22 RX ADMIN — ACETAMINOPHEN 650 MG: 325 TABLET ORAL at 08:21

## 2020-09-22 NOTE — PLAN OF CARE
Problem: Potential for Falls  Goal: Patient will remain free of falls  Description: INTERVENTIONS:  - Assess patient frequently for physical needs  -  Identify cognitive and physical deficits and behaviors that affect risk of falls    -  [place] fall precautions as indicated by assessment   - Educate patient/family on patient safety including physical limitations  - Instruct patient to call for assistance with activity based on assessment  - Modify environment to reduce risk of injury  - Consider OT/PT consult to assist with strengthening/mobility  Outcome: Progressing

## 2020-10-05 RX ORDER — ACETAMINOPHEN 325 MG/1
650 TABLET ORAL ONCE
Status: CANCELLED | OUTPATIENT
Start: 2020-10-06

## 2020-10-05 RX ORDER — SODIUM CHLORIDE 9 MG/ML
20 INJECTION, SOLUTION INTRAVENOUS ONCE
Status: CANCELLED | OUTPATIENT
Start: 2020-10-06

## 2020-10-06 ENCOUNTER — HOSPITAL ENCOUNTER (OUTPATIENT)
Dept: INFUSION CENTER | Facility: CLINIC | Age: 65
Discharge: HOME | End: 2020-10-06
Payer: MEDICARE

## 2020-10-06 VITALS
DIASTOLIC BLOOD PRESSURE: 80 MMHG | HEART RATE: 67 BPM | SYSTOLIC BLOOD PRESSURE: 160 MMHG | BODY MASS INDEX: 31.1 KG/M2 | RESPIRATION RATE: 16 BRPM | TEMPERATURE: 98.2 F | WEIGHT: 235.67 LBS

## 2020-10-06 DIAGNOSIS — C91.10 CLL (CHRONIC LYMPHOCYTIC LEUKEMIA) (HCC): Primary | ICD-10-CM

## 2020-10-06 LAB
ALBUMIN SERPL BCP-MCNC: 3.8 G/DL (ref 3.5–5.7)
ALP SERPL-CCNC: 73 U/L (ref 46–116)
ALT SERPL W P-5'-P-CCNC: 48 U/L (ref 12–78)
ANION GAP SERPL CALCULATED.3IONS-SCNC: 7 MMOL/L (ref 4–13)
AST SERPL W P-5'-P-CCNC: 35 U/L (ref 5–45)
BASOPHILS # BLD MANUAL: 0 THOUSAND/UL (ref 0–0.1)
BASOPHILS NFR MAR MANUAL: 0 % (ref 0–1)
BILIRUB SERPL-MCNC: 0.7 MG/DL (ref 0.2–1)
BUN SERPL-MCNC: 17 MG/DL (ref 5–25)
CALCIUM SERPL-MCNC: 9.5 MG/DL (ref 8.3–10.1)
CHLORIDE SERPL-SCNC: 104 MMOL/L (ref 98–108)
CO2 SERPL-SCNC: 28 MMOL/L (ref 21–32)
CREAT SERPL-MCNC: 1.2 MG/DL (ref 0.6–1.3)
EOSINOPHIL # BLD MANUAL: 0.29 THOUSAND/UL (ref 0–0.4)
EOSINOPHIL NFR BLD MANUAL: 7 % (ref 0–6)
ERYTHROCYTE [DISTWIDTH] IN BLOOD BY AUTOMATED COUNT: 13.7 % (ref 11.6–15.1)
GFR SERPL CREATININE-BSD FRML MDRD: 63 ML/MIN/1.73SQ M
GLUCOSE SERPL-MCNC: 199 MG/DL (ref 65–140)
HCT VFR BLD AUTO: 41.6 % (ref 36.5–49.3)
HGB BLD-MCNC: 14.2 G/DL (ref 12–17)
HGB RETIC QN AUTO: 36.1 PG (ref 30–38.3)
IGG SERPL-MCNC: 556 MG/DL (ref 700–1600)
IMM RETICS NFR: 11.9 % (ref 0–14)
LDH SERPL-CCNC: 247 U/L (ref 81–234)
LYMPHOCYTES # BLD AUTO: 0.45 THOUSAND/UL (ref 0.6–4.47)
LYMPHOCYTES # BLD AUTO: 11 % (ref 14–44)
MCH RBC QN AUTO: 30.7 PG (ref 26.8–34.3)
MCHC RBC AUTO-ENTMCNC: 34.1 G/DL (ref 31.4–37.4)
MCV RBC AUTO: 90 FL (ref 82–98)
MONOCYTES # BLD AUTO: 0.58 THOUSAND/UL (ref 0–1.22)
MONOCYTES NFR BLD: 14 % (ref 4–12)
NEUTROPHILS # BLD MANUAL: 2.79 THOUSAND/UL (ref 1.85–7.62)
NEUTS BAND NFR BLD MANUAL: 2 % (ref 0–8)
NEUTS SEG NFR BLD AUTO: 66 % (ref 43–75)
PLATELET # BLD AUTO: 120 THOUSANDS/UL (ref 149–390)
PLATELET BLD QL SMEAR: ABNORMAL
PMV BLD AUTO: 11.5 FL (ref 8.9–12.7)
POTASSIUM SERPL-SCNC: 3.9 MMOL/L (ref 3.5–5.3)
PROT SERPL-MCNC: 6 G/DL (ref 6.4–8.2)
RBC # BLD AUTO: 4.62 MILLION/UL (ref 3.88–5.62)
RBC MORPH BLD: NORMAL
RETICS # AUTO: ABNORMAL 10*3/UL (ref 14356–105094)
RETICS # CALC: 2.28 % (ref 0.37–1.87)
SODIUM SERPL-SCNC: 139 MMOL/L (ref 136–145)
TOTAL CELLS COUNTED SPEC: 100
URATE SERPL-MCNC: 3.7 MG/DL (ref 4.2–8)
WBC # BLD AUTO: 4.11 THOUSAND/UL (ref 4.31–10.16)

## 2020-10-06 PROCEDURE — 85007 BL SMEAR W/DIFF WBC COUNT: CPT | Performed by: INTERNAL MEDICINE

## 2020-10-06 PROCEDURE — 96413 CHEMO IV INFUSION 1 HR: CPT

## 2020-10-06 PROCEDURE — 85046 RETICYTE/HGB CONCENTRATE: CPT | Performed by: INTERNAL MEDICINE

## 2020-10-06 PROCEDURE — 82232 ASSAY OF BETA-2 PROTEIN: CPT | Performed by: INTERNAL MEDICINE

## 2020-10-06 PROCEDURE — 96415 CHEMO IV INFUSION ADDL HR: CPT

## 2020-10-06 PROCEDURE — 84550 ASSAY OF BLOOD/URIC ACID: CPT | Performed by: INTERNAL MEDICINE

## 2020-10-06 PROCEDURE — 82784 ASSAY IGA/IGD/IGG/IGM EACH: CPT | Performed by: INTERNAL MEDICINE

## 2020-10-06 PROCEDURE — 85027 COMPLETE CBC AUTOMATED: CPT | Performed by: INTERNAL MEDICINE

## 2020-10-06 PROCEDURE — 83615 LACTATE (LD) (LDH) ENZYME: CPT | Performed by: INTERNAL MEDICINE

## 2020-10-06 PROCEDURE — 80053 COMPREHEN METABOLIC PANEL: CPT | Performed by: INTERNAL MEDICINE

## 2020-10-06 PROCEDURE — 96375 TX/PRO/DX INJ NEW DRUG ADDON: CPT

## 2020-10-06 PROCEDURE — 96367 TX/PROPH/DG ADDL SEQ IV INF: CPT

## 2020-10-06 RX ORDER — ACETAMINOPHEN 325 MG/1
650 TABLET ORAL ONCE
Status: COMPLETED | OUTPATIENT
Start: 2020-10-06 | End: 2020-10-06

## 2020-10-06 RX ORDER — SODIUM CHLORIDE 9 MG/ML
20 INJECTION, SOLUTION INTRAVENOUS ONCE
Status: COMPLETED | OUTPATIENT
Start: 2020-10-06 | End: 2020-10-06

## 2020-10-06 RX ADMIN — ACETAMINOPHEN 650 MG: 325 TABLET, FILM COATED ORAL at 08:36

## 2020-10-06 RX ADMIN — OBINUTUZUMAB 1000 MG: 1000 INJECTION, SOLUTION, CONCENTRATE INTRAVENOUS at 10:28

## 2020-10-06 RX ADMIN — SODIUM CHLORIDE 20 ML/HR: 0.9 INJECTION, SOLUTION INTRAVENOUS at 08:30

## 2020-10-06 RX ADMIN — DIPHENHYDRAMINE HYDROCHLORIDE 25 MG: 50 INJECTION, SOLUTION INTRAMUSCULAR; INTRAVENOUS at 09:38

## 2020-10-06 RX ADMIN — DEXAMETHASONE SODIUM PHOSPHATE 20 MG: 10 INJECTION, SOLUTION INTRAMUSCULAR; INTRAVENOUS at 09:22

## 2020-10-08 LAB — B2 MICROGLOB SERPL-MCNC: 2.7 MG/L (ref 0.6–2.4)

## 2020-10-14 ENCOUNTER — OFFICE VISIT (OUTPATIENT)
Dept: HEMATOLOGY ONCOLOGY | Facility: CLINIC | Age: 65
End: 2020-10-14
Payer: MEDICARE

## 2020-10-14 VITALS
HEART RATE: 68 BPM | WEIGHT: 232 LBS | OXYGEN SATURATION: 98 % | DIASTOLIC BLOOD PRESSURE: 80 MMHG | HEIGHT: 73 IN | SYSTOLIC BLOOD PRESSURE: 142 MMHG | TEMPERATURE: 98.5 F | BODY MASS INDEX: 30.75 KG/M2 | RESPIRATION RATE: 18 BRPM

## 2020-10-14 DIAGNOSIS — D69.6 THROMBOCYTOPENIA (HCC): ICD-10-CM

## 2020-10-14 DIAGNOSIS — D80.1 HYPOGAMMAGLOBULINEMIA, ACQUIRED (HCC): ICD-10-CM

## 2020-10-14 DIAGNOSIS — D59.10 AUTOIMMUNE HEMOLYTIC ANEMIA (HCC): ICD-10-CM

## 2020-10-14 DIAGNOSIS — C91.10 CLL (CHRONIC LYMPHOCYTIC LEUKEMIA) (HCC): Primary | ICD-10-CM

## 2020-10-14 PROCEDURE — 99214 OFFICE O/P EST MOD 30 MIN: CPT | Performed by: INTERNAL MEDICINE

## 2020-10-20 ENCOUNTER — HOSPITAL ENCOUNTER (OUTPATIENT)
Dept: INFUSION CENTER | Facility: CLINIC | Age: 65
Discharge: HOME | End: 2020-10-20
Payer: MEDICARE

## 2020-10-20 ENCOUNTER — TELEPHONE (OUTPATIENT)
Dept: HEMATOLOGY ONCOLOGY | Facility: CLINIC | Age: 65
End: 2020-10-20

## 2020-10-20 VITALS
TEMPERATURE: 97.2 F | WEIGHT: 231.48 LBS | DIASTOLIC BLOOD PRESSURE: 70 MMHG | BODY MASS INDEX: 30.54 KG/M2 | RESPIRATION RATE: 20 BRPM | SYSTOLIC BLOOD PRESSURE: 160 MMHG | HEART RATE: 85 BPM

## 2020-10-20 DIAGNOSIS — C91.10 CLL (CHRONIC LYMPHOCYTIC LEUKEMIA) (HCC): Primary | ICD-10-CM

## 2020-10-20 DIAGNOSIS — D80.1 HYPOGAMMAGLOBULINEMIA, ACQUIRED (HCC): ICD-10-CM

## 2020-10-20 PROCEDURE — 96365 THER/PROPH/DIAG IV INF INIT: CPT

## 2020-10-20 PROCEDURE — 96375 TX/PRO/DX INJ NEW DRUG ADDON: CPT

## 2020-10-20 PROCEDURE — 96366 THER/PROPH/DIAG IV INF ADDON: CPT

## 2020-10-20 PROCEDURE — 96367 TX/PROPH/DG ADDL SEQ IV INF: CPT

## 2020-10-20 RX ORDER — ACETAMINOPHEN 325 MG/1
650 TABLET ORAL ONCE
Status: CANCELLED | OUTPATIENT
Start: 2020-11-17

## 2020-10-20 RX ORDER — SODIUM CHLORIDE 9 MG/ML
20 INJECTION, SOLUTION INTRAVENOUS ONCE
Status: CANCELLED | OUTPATIENT
Start: 2020-11-17

## 2020-10-20 RX ORDER — ACETAMINOPHEN 325 MG/1
650 TABLET ORAL ONCE
Status: COMPLETED | OUTPATIENT
Start: 2020-10-20 | End: 2020-10-20

## 2020-10-20 RX ORDER — SODIUM CHLORIDE 9 MG/ML
20 INJECTION, SOLUTION INTRAVENOUS ONCE
Status: COMPLETED | OUTPATIENT
Start: 2020-10-20 | End: 2020-10-20

## 2020-10-20 RX ADMIN — SODIUM CHLORIDE 20 ML/HR: 0.9 INJECTION, SOLUTION INTRAVENOUS at 08:34

## 2020-10-20 RX ADMIN — Medication 20 G: at 09:09

## 2020-10-20 RX ADMIN — HYDROCORTISONE SODIUM SUCCINATE 100 MG: 100 INJECTION, POWDER, FOR SOLUTION INTRAMUSCULAR; INTRAVENOUS at 08:34

## 2020-10-20 RX ADMIN — DIPHENHYDRAMINE HYDROCHLORIDE 12.5 MG: 50 INJECTION, SOLUTION INTRAMUSCULAR; INTRAVENOUS at 08:36

## 2020-10-20 RX ADMIN — ACETAMINOPHEN 650 MG: 325 TABLET, FILM COATED ORAL at 08:33

## 2020-10-21 ENCOUNTER — TELEPHONE (OUTPATIENT)
Dept: HEMATOLOGY ONCOLOGY | Facility: CLINIC | Age: 65
End: 2020-10-21

## 2020-10-21 DIAGNOSIS — C91.10 CLL (CHRONIC LYMPHOCYTIC LEUKEMIA) (HCC): ICD-10-CM

## 2020-10-21 RX ORDER — PREDNISONE 1 MG/1
5 TABLET ORAL DAILY
Qty: 90 TABLET | Refills: 2 | Status: SHIPPED | OUTPATIENT
Start: 2020-10-21 | End: 2021-05-25

## 2020-10-28 ENCOUNTER — OFFICE VISIT (OUTPATIENT)
Dept: ENDOCRINOLOGY | Facility: CLINIC | Age: 65
End: 2020-10-28
Payer: MEDICARE

## 2020-10-28 VITALS
DIASTOLIC BLOOD PRESSURE: 82 MMHG | SYSTOLIC BLOOD PRESSURE: 120 MMHG | HEART RATE: 60 BPM | HEIGHT: 73 IN | BODY MASS INDEX: 30.91 KG/M2 | WEIGHT: 233.2 LBS | TEMPERATURE: 97.8 F

## 2020-10-28 DIAGNOSIS — Z79.4 TYPE 2 DIABETES MELLITUS WITHOUT COMPLICATION, WITH LONG-TERM CURRENT USE OF INSULIN (HCC): ICD-10-CM

## 2020-10-28 DIAGNOSIS — E78.2 MIXED HYPERLIPIDEMIA: ICD-10-CM

## 2020-10-28 DIAGNOSIS — I10 ESSENTIAL HYPERTENSION: ICD-10-CM

## 2020-10-28 DIAGNOSIS — T38.0X5S STEROID-INDUCED DIABETES MELLITUS, SEQUELA (HCC): Primary | ICD-10-CM

## 2020-10-28 DIAGNOSIS — E09.9 STEROID-INDUCED DIABETES MELLITUS, SEQUELA (HCC): Primary | ICD-10-CM

## 2020-10-28 DIAGNOSIS — E11.9 TYPE 2 DIABETES MELLITUS WITHOUT COMPLICATION, WITH LONG-TERM CURRENT USE OF INSULIN (HCC): ICD-10-CM

## 2020-10-28 PROCEDURE — 99214 OFFICE O/P EST MOD 30 MIN: CPT | Performed by: PHYSICIAN ASSISTANT

## 2020-10-30 ENCOUNTER — TELEPHONE (OUTPATIENT)
Dept: ADMINISTRATIVE | Facility: OTHER | Age: 65
End: 2020-10-30

## 2020-10-31 RX ORDER — ACETAMINOPHEN 325 MG/1
650 TABLET ORAL ONCE
Status: CANCELLED | OUTPATIENT
Start: 2020-11-03

## 2020-10-31 RX ORDER — SODIUM CHLORIDE 9 MG/ML
20 INJECTION, SOLUTION INTRAVENOUS ONCE
Status: CANCELLED | OUTPATIENT
Start: 2020-11-03

## 2020-11-02 ENCOUNTER — TELEPHONE (OUTPATIENT)
Dept: HEMATOLOGY ONCOLOGY | Facility: CLINIC | Age: 65
End: 2020-11-02

## 2020-11-02 DIAGNOSIS — C91.10 CHRONIC LYMPHATIC LEUKEMIA (HCC): ICD-10-CM

## 2020-11-03 ENCOUNTER — HOSPITAL ENCOUNTER (OUTPATIENT)
Dept: INFUSION CENTER | Facility: CLINIC | Age: 65
Discharge: HOME | End: 2020-11-03
Payer: MEDICARE

## 2020-11-03 VITALS
TEMPERATURE: 97.7 F | HEART RATE: 76 BPM | DIASTOLIC BLOOD PRESSURE: 77 MMHG | RESPIRATION RATE: 20 BRPM | SYSTOLIC BLOOD PRESSURE: 153 MMHG

## 2020-11-03 DIAGNOSIS — C91.10 CLL (CHRONIC LYMPHOCYTIC LEUKEMIA) (HCC): Primary | ICD-10-CM

## 2020-11-03 LAB
ALBUMIN SERPL BCP-MCNC: 4.2 G/DL (ref 3.5–5.7)
ALP SERPL-CCNC: 53 U/L (ref 46–116)
ALT SERPL W P-5'-P-CCNC: 35 U/L (ref 12–78)
ANION GAP SERPL CALCULATED.3IONS-SCNC: 13 MMOL/L (ref 4–13)
AST SERPL W P-5'-P-CCNC: 36 U/L (ref 5–45)
BASOPHILS # BLD MANUAL: 0 THOUSAND/UL (ref 0–0.1)
BASOPHILS NFR MAR MANUAL: 0 % (ref 0–1)
BILIRUB SERPL-MCNC: 0.7 MG/DL (ref 0.2–1)
BUN SERPL-MCNC: 18 MG/DL (ref 5–25)
CALCIUM SERPL-MCNC: 9.6 MG/DL (ref 8.3–10.1)
CHLORIDE SERPL-SCNC: 105 MMOL/L (ref 98–108)
CO2 SERPL-SCNC: 27 MMOL/L (ref 21–32)
CREAT SERPL-MCNC: 1 MG/DL (ref 0.6–1.3)
EOSINOPHIL # BLD MANUAL: 0.13 THOUSAND/UL (ref 0–0.4)
EOSINOPHIL NFR BLD MANUAL: 3 % (ref 0–6)
ERYTHROCYTE [DISTWIDTH] IN BLOOD BY AUTOMATED COUNT: 13.7 % (ref 11.6–15.1)
GFR SERPL CREATININE-BSD FRML MDRD: 78 ML/MIN/1.73SQ M
GLUCOSE SERPL-MCNC: 207 MG/DL (ref 65–140)
HCT VFR BLD AUTO: 42.6 % (ref 36.5–49.3)
HGB BLD-MCNC: 14.3 G/DL (ref 12–17)
HGB RETIC QN AUTO: 34.3 PG (ref 30–38.3)
IGG SERPL-MCNC: 547 MG/DL (ref 700–1600)
IMM RETICS NFR: 12.1 % (ref 0–14)
LDH SERPL-CCNC: 258 U/L (ref 81–234)
LYMPHOCYTES # BLD AUTO: 0.49 THOUSAND/UL (ref 0.6–4.47)
LYMPHOCYTES # BLD AUTO: 11 % (ref 14–44)
MCH RBC QN AUTO: 30.6 PG (ref 26.8–34.3)
MCHC RBC AUTO-ENTMCNC: 33.6 G/DL (ref 31.4–37.4)
MCV RBC AUTO: 91 FL (ref 82–98)
MONOCYTES # BLD AUTO: 0.4 THOUSAND/UL (ref 0–1.22)
MONOCYTES NFR BLD: 9 % (ref 4–12)
NEUTROPHILS # BLD MANUAL: 3.44 THOUSAND/UL (ref 1.85–7.62)
NEUTS BAND NFR BLD MANUAL: 3 % (ref 0–8)
NEUTS SEG NFR BLD AUTO: 74 % (ref 43–75)
PLATELET # BLD AUTO: 149 THOUSANDS/UL (ref 149–390)
PLATELET BLD QL SMEAR: ABNORMAL
PMV BLD AUTO: 11.1 FL (ref 8.9–12.7)
POTASSIUM SERPL-SCNC: 4.1 MMOL/L (ref 3.5–5.3)
PROT SERPL-MCNC: 6.3 G/DL (ref 6.4–8.2)
RBC # BLD AUTO: 4.67 MILLION/UL (ref 3.88–5.62)
RBC MORPH BLD: NORMAL
RETICS # AUTO: ABNORMAL 10*3/UL (ref 14356–105094)
RETICS # CALC: 2.44 % (ref 0.37–1.87)
SODIUM SERPL-SCNC: 145 MMOL/L (ref 136–145)
TOTAL CELLS COUNTED SPEC: 100
URATE SERPL-MCNC: 4.3 MG/DL (ref 4.2–8)
WBC # BLD AUTO: 4.47 THOUSAND/UL (ref 4.31–10.16)

## 2020-11-03 PROCEDURE — 96367 TX/PROPH/DG ADDL SEQ IV INF: CPT

## 2020-11-03 PROCEDURE — 85007 BL SMEAR W/DIFF WBC COUNT: CPT | Performed by: INTERNAL MEDICINE

## 2020-11-03 PROCEDURE — 85027 COMPLETE CBC AUTOMATED: CPT | Performed by: INTERNAL MEDICINE

## 2020-11-03 PROCEDURE — 96415 CHEMO IV INFUSION ADDL HR: CPT

## 2020-11-03 PROCEDURE — 85046 RETICYTE/HGB CONCENTRATE: CPT | Performed by: INTERNAL MEDICINE

## 2020-11-03 PROCEDURE — 80053 COMPREHEN METABOLIC PANEL: CPT | Performed by: INTERNAL MEDICINE

## 2020-11-03 PROCEDURE — 84550 ASSAY OF BLOOD/URIC ACID: CPT | Performed by: INTERNAL MEDICINE

## 2020-11-03 PROCEDURE — 96413 CHEMO IV INFUSION 1 HR: CPT

## 2020-11-03 PROCEDURE — 83615 LACTATE (LD) (LDH) ENZYME: CPT | Performed by: INTERNAL MEDICINE

## 2020-11-03 PROCEDURE — 82784 ASSAY IGA/IGD/IGG/IGM EACH: CPT | Performed by: INTERNAL MEDICINE

## 2020-11-03 PROCEDURE — 82232 ASSAY OF BETA-2 PROTEIN: CPT | Performed by: INTERNAL MEDICINE

## 2020-11-03 RX ORDER — SODIUM CHLORIDE 9 MG/ML
20 INJECTION, SOLUTION INTRAVENOUS ONCE
Status: COMPLETED | OUTPATIENT
Start: 2020-11-03 | End: 2020-11-03

## 2020-11-03 RX ORDER — ACETAMINOPHEN 325 MG/1
650 TABLET ORAL ONCE
Status: COMPLETED | OUTPATIENT
Start: 2020-11-03 | End: 2020-11-03

## 2020-11-03 RX ADMIN — DEXAMETHASONE SODIUM PHOSPHATE 20 MG: 10 INJECTION, SOLUTION INTRAMUSCULAR; INTRAVENOUS at 08:27

## 2020-11-03 RX ADMIN — OBINUTUZUMAB 1000 MG: 1000 INJECTION, SOLUTION, CONCENTRATE INTRAVENOUS at 09:50

## 2020-11-03 RX ADMIN — DIPHENHYDRAMINE HYDROCHLORIDE 25 MG: 50 INJECTION, SOLUTION INTRAMUSCULAR; INTRAVENOUS at 08:52

## 2020-11-03 RX ADMIN — SODIUM CHLORIDE 20 ML/HR: 0.9 INJECTION, SOLUTION INTRAVENOUS at 08:29

## 2020-11-03 RX ADMIN — ACETAMINOPHEN 650 MG: 325 TABLET, FILM COATED ORAL at 08:54

## 2020-11-05 LAB — B2 MICROGLOB SERPL-MCNC: 2.2 MG/L (ref 0.6–2.4)

## 2020-11-17 ENCOUNTER — HOSPITAL ENCOUNTER (OUTPATIENT)
Dept: INFUSION CENTER | Facility: CLINIC | Age: 65
Discharge: HOME | End: 2020-11-17
Payer: MEDICARE

## 2020-11-17 VITALS
DIASTOLIC BLOOD PRESSURE: 78 MMHG | OXYGEN SATURATION: 97 % | HEART RATE: 71 BPM | TEMPERATURE: 97.9 F | WEIGHT: 233.69 LBS | SYSTOLIC BLOOD PRESSURE: 158 MMHG | RESPIRATION RATE: 18 BRPM | BODY MASS INDEX: 30.83 KG/M2

## 2020-11-17 DIAGNOSIS — C91.10 CLL (CHRONIC LYMPHOCYTIC LEUKEMIA) (HCC): Primary | ICD-10-CM

## 2020-11-17 DIAGNOSIS — D80.1 HYPOGAMMAGLOBULINEMIA, ACQUIRED (HCC): ICD-10-CM

## 2020-11-17 PROCEDURE — 96365 THER/PROPH/DIAG IV INF INIT: CPT

## 2020-11-17 PROCEDURE — 96367 TX/PROPH/DG ADDL SEQ IV INF: CPT

## 2020-11-17 PROCEDURE — 96375 TX/PRO/DX INJ NEW DRUG ADDON: CPT

## 2020-11-17 PROCEDURE — 96366 THER/PROPH/DIAG IV INF ADDON: CPT

## 2020-11-17 RX ORDER — ACETAMINOPHEN 325 MG/1
650 TABLET ORAL ONCE
Status: CANCELLED | OUTPATIENT
Start: 2020-12-15

## 2020-11-17 RX ORDER — SODIUM CHLORIDE 9 MG/ML
20 INJECTION, SOLUTION INTRAVENOUS ONCE
Status: COMPLETED | OUTPATIENT
Start: 2020-11-17 | End: 2020-11-17

## 2020-11-17 RX ORDER — ACETAMINOPHEN 325 MG/1
650 TABLET ORAL ONCE
Status: COMPLETED | OUTPATIENT
Start: 2020-11-17 | End: 2020-11-17

## 2020-11-17 RX ORDER — SODIUM CHLORIDE 9 MG/ML
20 INJECTION, SOLUTION INTRAVENOUS ONCE
Status: CANCELLED | OUTPATIENT
Start: 2020-12-15

## 2020-11-17 RX ADMIN — Medication 20 G: at 09:41

## 2020-11-17 RX ADMIN — HYDROCORTISONE SODIUM SUCCINATE 100 MG: 100 INJECTION, POWDER, FOR SOLUTION INTRAMUSCULAR; INTRAVENOUS at 08:35

## 2020-11-17 RX ADMIN — SODIUM CHLORIDE 20 ML/HR: 0.9 INJECTION, SOLUTION INTRAVENOUS at 08:34

## 2020-11-17 RX ADMIN — DIPHENHYDRAMINE HYDROCHLORIDE 12.5 MG: 50 INJECTION, SOLUTION INTRAMUSCULAR; INTRAVENOUS at 08:42

## 2020-11-17 RX ADMIN — ACETAMINOPHEN 650 MG: 325 TABLET, FILM COATED ORAL at 08:35

## 2020-11-24 ENCOUNTER — TELEPHONE (OUTPATIENT)
Dept: HEMATOLOGY ONCOLOGY | Facility: CLINIC | Age: 65
End: 2020-11-24

## 2020-11-24 DIAGNOSIS — G62.9 NEUROPATHY: ICD-10-CM

## 2020-11-24 RX ORDER — GABAPENTIN 600 MG/1
600 TABLET ORAL DAILY
Qty: 30 TABLET | Refills: 0 | Status: SHIPPED | OUTPATIENT
Start: 2020-11-24 | End: 2021-04-16 | Stop reason: SDUPTHER

## 2020-11-27 DIAGNOSIS — C91.10 CLL (CHRONIC LYMPHOCYTIC LEUKEMIA) (HCC): Primary | ICD-10-CM

## 2020-11-27 RX ORDER — ACETAMINOPHEN 325 MG/1
650 TABLET ORAL ONCE
Status: CANCELLED | OUTPATIENT
Start: 2020-12-01

## 2020-11-27 RX ORDER — SODIUM CHLORIDE 9 MG/ML
20 INJECTION, SOLUTION INTRAVENOUS ONCE
Status: CANCELLED | OUTPATIENT
Start: 2020-12-01

## 2020-12-01 ENCOUNTER — HOSPITAL ENCOUNTER (OUTPATIENT)
Dept: INFUSION CENTER | Facility: CLINIC | Age: 65
Discharge: HOME | End: 2020-12-01
Payer: MEDICARE

## 2020-12-01 ENCOUNTER — LAB (OUTPATIENT)
Dept: LAB | Facility: CLINIC | Age: 65
End: 2020-12-01
Payer: MEDICARE

## 2020-12-01 VITALS
DIASTOLIC BLOOD PRESSURE: 78 MMHG | WEIGHT: 232.81 LBS | TEMPERATURE: 97.5 F | SYSTOLIC BLOOD PRESSURE: 160 MMHG | HEART RATE: 74 BPM | RESPIRATION RATE: 16 BRPM | BODY MASS INDEX: 30.71 KG/M2

## 2020-12-01 DIAGNOSIS — E78.2 MIXED HYPERLIPIDEMIA: ICD-10-CM

## 2020-12-01 DIAGNOSIS — E11.9 TYPE 2 DIABETES MELLITUS WITHOUT COMPLICATION, WITH LONG-TERM CURRENT USE OF INSULIN (HCC): ICD-10-CM

## 2020-12-01 DIAGNOSIS — C91.10 CLL (CHRONIC LYMPHOCYTIC LEUKEMIA) (HCC): Primary | ICD-10-CM

## 2020-12-01 DIAGNOSIS — Z79.4 TYPE 2 DIABETES MELLITUS WITHOUT COMPLICATION, WITH LONG-TERM CURRENT USE OF INSULIN (HCC): ICD-10-CM

## 2020-12-01 LAB
ALBUMIN SERPL BCP-MCNC: 3.9 G/DL (ref 3.5–5.7)
ALP SERPL-CCNC: 59 U/L (ref 46–116)
ALT SERPL W P-5'-P-CCNC: 37 U/L (ref 12–78)
ANION GAP SERPL CALCULATED.3IONS-SCNC: 13 MMOL/L (ref 4–13)
AST SERPL W P-5'-P-CCNC: 33 U/L (ref 5–45)
BASOPHILS # BLD MANUAL: 0.05 THOUSAND/UL (ref 0–0.1)
BASOPHILS NFR MAR MANUAL: 1 % (ref 0–1)
BILIRUB SERPL-MCNC: 0.7 MG/DL (ref 0.2–1)
BUN SERPL-MCNC: 20 MG/DL (ref 5–25)
CALCIUM SERPL-MCNC: 9.5 MG/DL (ref 8.3–10.1)
CHLORIDE SERPL-SCNC: 104 MMOL/L (ref 98–108)
CHOLEST SERPL-MCNC: 192 MG/DL (ref 50–200)
CO2 SERPL-SCNC: 26 MMOL/L (ref 21–32)
CREAT SERPL-MCNC: 1.4 MG/DL (ref 0.6–1.3)
CREAT UR-MCNC: 158 MG/DL
EOSINOPHIL # BLD MANUAL: 0.43 THOUSAND/UL (ref 0–0.4)
EOSINOPHIL NFR BLD MANUAL: 8 % (ref 0–6)
ERYTHROCYTE [DISTWIDTH] IN BLOOD BY AUTOMATED COUNT: 13.6 % (ref 11.6–15.1)
EST. AVERAGE GLUCOSE BLD GHB EST-MCNC: 180 MG/DL
GFR SERPL CREATININE-BSD FRML MDRD: 52 ML/MIN/1.73SQ M
GLUCOSE P FAST SERPL-MCNC: 170 MG/DL (ref 65–99)
GLUCOSE SERPL-MCNC: 170 MG/DL (ref 65–140)
HBA1C MFR BLD: 7.9 %
HCT VFR BLD AUTO: 43.8 % (ref 36.5–49.3)
HDLC SERPL-MCNC: 28 MG/DL
HGB BLD-MCNC: 14.6 G/DL (ref 12–17)
HGB RETIC QN AUTO: 35.8 PG (ref 30–38.3)
IGG SERPL-MCNC: 530 MG/DL (ref 700–1600)
IMM RETICS NFR: 10.6 % (ref 0–14)
LDH SERPL-CCNC: 274 U/L (ref 81–234)
LDLC SERPL CALC-MCNC: 136 MG/DL (ref 0–100)
LYMPHOCYTES # BLD AUTO: 0.37 THOUSAND/UL (ref 0.6–4.47)
LYMPHOCYTES # BLD AUTO: 7 % (ref 14–44)
MCH RBC QN AUTO: 30.4 PG (ref 26.8–34.3)
MCHC RBC AUTO-ENTMCNC: 33.3 G/DL (ref 31.4–37.4)
MCV RBC AUTO: 91 FL (ref 82–98)
MICROALBUMIN UR-MCNC: 10 MG/L (ref 0–20)
MICROALBUMIN/CREAT 24H UR: 6 MG/G CREATININE (ref 0–30)
MONOCYTES # BLD AUTO: 0.53 THOUSAND/UL (ref 0–1.22)
MONOCYTES NFR BLD: 10 % (ref 4–12)
NEUTROPHILS # BLD MANUAL: 3.94 THOUSAND/UL (ref 1.85–7.62)
NEUTS BAND NFR BLD MANUAL: 1 % (ref 0–8)
NEUTS SEG NFR BLD AUTO: 73 % (ref 43–75)
PLATELET # BLD AUTO: 133 THOUSANDS/UL (ref 149–390)
PLATELET BLD QL SMEAR: ABNORMAL
PMV BLD AUTO: 11.3 FL (ref 8.9–12.7)
POTASSIUM SERPL-SCNC: 4.1 MMOL/L (ref 3.5–5.3)
PROT SERPL-MCNC: 6.2 G/DL (ref 6.4–8.2)
RBC # BLD AUTO: 4.81 MILLION/UL (ref 3.88–5.62)
RBC MORPH BLD: NORMAL
RETICS # AUTO: ABNORMAL 10*3/UL (ref 14356–105094)
RETICS # CALC: 2.23 % (ref 0.37–1.87)
SODIUM SERPL-SCNC: 143 MMOL/L (ref 136–145)
T4 FREE SERPL-MCNC: 0.95 NG/DL (ref 0.76–1.46)
TOTAL CELLS COUNTED SPEC: 100
TRIGL SERPL-MCNC: 138 MG/DL
TSH SERPL DL<=0.05 MIU/L-ACNC: 1.19 UIU/ML (ref 0.36–3.74)
URATE SERPL-MCNC: 4.5 MG/DL (ref 4.2–8)
WBC # BLD AUTO: 5.33 THOUSAND/UL (ref 4.31–10.16)

## 2020-12-01 PROCEDURE — 96413 CHEMO IV INFUSION 1 HR: CPT

## 2020-12-01 PROCEDURE — 82570 ASSAY OF URINE CREATININE: CPT

## 2020-12-01 PROCEDURE — 83036 HEMOGLOBIN GLYCOSYLATED A1C: CPT

## 2020-12-01 PROCEDURE — 80053 COMPREHEN METABOLIC PANEL: CPT | Performed by: INTERNAL MEDICINE

## 2020-12-01 PROCEDURE — 96415 CHEMO IV INFUSION ADDL HR: CPT

## 2020-12-01 PROCEDURE — 85046 RETICYTE/HGB CONCENTRATE: CPT | Performed by: INTERNAL MEDICINE

## 2020-12-01 PROCEDURE — 84443 ASSAY THYROID STIM HORMONE: CPT

## 2020-12-01 PROCEDURE — 80061 LIPID PANEL: CPT

## 2020-12-01 PROCEDURE — 84439 ASSAY OF FREE THYROXINE: CPT

## 2020-12-01 PROCEDURE — 85007 BL SMEAR W/DIFF WBC COUNT: CPT | Performed by: INTERNAL MEDICINE

## 2020-12-01 PROCEDURE — 84550 ASSAY OF BLOOD/URIC ACID: CPT | Performed by: INTERNAL MEDICINE

## 2020-12-01 PROCEDURE — 36415 COLL VENOUS BLD VENIPUNCTURE: CPT

## 2020-12-01 PROCEDURE — 96367 TX/PROPH/DG ADDL SEQ IV INF: CPT

## 2020-12-01 PROCEDURE — 83615 LACTATE (LD) (LDH) ENZYME: CPT | Performed by: INTERNAL MEDICINE

## 2020-12-01 PROCEDURE — 82784 ASSAY IGA/IGD/IGG/IGM EACH: CPT | Performed by: INTERNAL MEDICINE

## 2020-12-01 PROCEDURE — 85027 COMPLETE CBC AUTOMATED: CPT | Performed by: INTERNAL MEDICINE

## 2020-12-01 PROCEDURE — 82232 ASSAY OF BETA-2 PROTEIN: CPT | Performed by: INTERNAL MEDICINE

## 2020-12-01 PROCEDURE — 82043 UR ALBUMIN QUANTITATIVE: CPT

## 2020-12-01 RX ORDER — SODIUM CHLORIDE 9 MG/ML
20 INJECTION, SOLUTION INTRAVENOUS ONCE
Status: COMPLETED | OUTPATIENT
Start: 2020-12-01 | End: 2020-12-01

## 2020-12-01 RX ORDER — ACETAMINOPHEN 325 MG/1
650 TABLET ORAL ONCE
Status: COMPLETED | OUTPATIENT
Start: 2020-12-01 | End: 2020-12-01

## 2020-12-01 RX ADMIN — SODIUM CHLORIDE 20 ML/HR: 0.9 INJECTION, SOLUTION INTRAVENOUS at 09:10

## 2020-12-01 RX ADMIN — ACETAMINOPHEN 650 MG: 325 TABLET, FILM COATED ORAL at 09:17

## 2020-12-01 RX ADMIN — OBINUTUZUMAB 1000 MG: 1000 INJECTION, SOLUTION, CONCENTRATE INTRAVENOUS at 10:36

## 2020-12-01 RX ADMIN — DEXAMETHASONE SODIUM PHOSPHATE 20 MG: 10 INJECTION, SOLUTION INTRAMUSCULAR; INTRAVENOUS at 09:15

## 2020-12-01 RX ADMIN — DIPHENHYDRAMINE HYDROCHLORIDE 25 MG: 50 INJECTION, SOLUTION INTRAMUSCULAR; INTRAVENOUS at 09:40

## 2020-12-03 ENCOUNTER — TELEPHONE (OUTPATIENT)
Dept: ENDOCRINOLOGY | Facility: CLINIC | Age: 65
End: 2020-12-03

## 2020-12-03 DIAGNOSIS — Z79.4 TYPE 2 DIABETES MELLITUS WITHOUT COMPLICATION, WITH LONG-TERM CURRENT USE OF INSULIN (HCC): Primary | ICD-10-CM

## 2020-12-03 DIAGNOSIS — E11.9 TYPE 2 DIABETES MELLITUS WITHOUT COMPLICATION, WITH LONG-TERM CURRENT USE OF INSULIN (HCC): Primary | ICD-10-CM

## 2020-12-03 LAB — B2 MICROGLOB SERPL-MCNC: 2.3 MG/L (ref 0.6–2.4)

## 2020-12-10 ENCOUNTER — TELEPHONE (OUTPATIENT)
Dept: FAMILY MEDICINE CLINIC | Facility: CLINIC | Age: 65
End: 2020-12-10

## 2020-12-11 DIAGNOSIS — E11.9 TYPE 2 DIABETES MELLITUS WITHOUT COMPLICATION, WITH LONG-TERM CURRENT USE OF INSULIN (HCC): Primary | ICD-10-CM

## 2020-12-11 DIAGNOSIS — E78.2 MIXED HYPERLIPIDEMIA: ICD-10-CM

## 2020-12-11 DIAGNOSIS — Z79.4 TYPE 2 DIABETES MELLITUS WITHOUT COMPLICATION, WITH LONG-TERM CURRENT USE OF INSULIN (HCC): Primary | ICD-10-CM

## 2020-12-11 RX ORDER — METFORMIN HYDROCHLORIDE 500 MG/1
1000 TABLET, EXTENDED RELEASE ORAL
Qty: 60 TABLET | Refills: 1 | Status: SHIPPED | OUTPATIENT
Start: 2020-12-11 | End: 2021-01-27 | Stop reason: SDUPTHER

## 2020-12-15 ENCOUNTER — HOSPITAL ENCOUNTER (OUTPATIENT)
Dept: INFUSION CENTER | Facility: CLINIC | Age: 65
Discharge: HOME | End: 2020-12-15
Payer: MEDICARE

## 2020-12-15 VITALS
DIASTOLIC BLOOD PRESSURE: 88 MMHG | SYSTOLIC BLOOD PRESSURE: 145 MMHG | RESPIRATION RATE: 16 BRPM | HEART RATE: 73 BPM | WEIGHT: 229.28 LBS | BODY MASS INDEX: 30.25 KG/M2 | TEMPERATURE: 97.9 F

## 2020-12-15 DIAGNOSIS — D80.1 HYPOGAMMAGLOBULINEMIA, ACQUIRED (HCC): ICD-10-CM

## 2020-12-15 DIAGNOSIS — C91.10 CLL (CHRONIC LYMPHOCYTIC LEUKEMIA) (HCC): Primary | ICD-10-CM

## 2020-12-15 PROCEDURE — 96366 THER/PROPH/DIAG IV INF ADDON: CPT

## 2020-12-15 PROCEDURE — 96365 THER/PROPH/DIAG IV INF INIT: CPT

## 2020-12-15 PROCEDURE — 96375 TX/PRO/DX INJ NEW DRUG ADDON: CPT

## 2020-12-15 PROCEDURE — 96367 TX/PROPH/DG ADDL SEQ IV INF: CPT

## 2020-12-15 RX ORDER — ACETAMINOPHEN 325 MG/1
650 TABLET ORAL ONCE
Status: COMPLETED | OUTPATIENT
Start: 2020-12-15 | End: 2020-12-15

## 2020-12-15 RX ORDER — SODIUM CHLORIDE 9 MG/ML
20 INJECTION, SOLUTION INTRAVENOUS ONCE
Status: COMPLETED | OUTPATIENT
Start: 2020-12-15 | End: 2020-12-15

## 2020-12-15 RX ORDER — SODIUM CHLORIDE 9 MG/ML
20 INJECTION, SOLUTION INTRAVENOUS ONCE
Status: CANCELLED | OUTPATIENT
Start: 2021-01-12

## 2020-12-15 RX ORDER — ACETAMINOPHEN 325 MG/1
650 TABLET ORAL ONCE
Status: CANCELLED | OUTPATIENT
Start: 2021-01-12

## 2020-12-15 RX ADMIN — ACETAMINOPHEN 650 MG: 325 TABLET, FILM COATED ORAL at 08:30

## 2020-12-15 RX ADMIN — Medication 20 G: at 09:07

## 2020-12-15 RX ADMIN — HYDROCORTISONE SODIUM SUCCINATE 100 MG: 100 INJECTION, POWDER, FOR SOLUTION INTRAMUSCULAR; INTRAVENOUS at 08:55

## 2020-12-15 RX ADMIN — SODIUM CHLORIDE 20 ML/HR: 0.9 INJECTION, SOLUTION INTRAVENOUS at 08:24

## 2020-12-15 RX ADMIN — DIPHENHYDRAMINE HYDROCHLORIDE 12.5 MG: 50 INJECTION, SOLUTION INTRAMUSCULAR; INTRAVENOUS at 08:25

## 2020-12-18 ENCOUNTER — TELEPHONE (OUTPATIENT)
Dept: FAMILY MEDICINE CLINIC | Facility: CLINIC | Age: 65
End: 2020-12-18

## 2020-12-23 RX ORDER — SODIUM CHLORIDE 9 MG/ML
20 INJECTION, SOLUTION INTRAVENOUS ONCE
Status: CANCELLED | OUTPATIENT
Start: 2020-12-29

## 2020-12-23 RX ORDER — ACETAMINOPHEN 325 MG/1
650 TABLET ORAL ONCE
Status: CANCELLED | OUTPATIENT
Start: 2020-12-29

## 2020-12-28 ENCOUNTER — TELEPHONE (OUTPATIENT)
Dept: HEMATOLOGY ONCOLOGY | Facility: CLINIC | Age: 65
End: 2020-12-28

## 2020-12-28 ENCOUNTER — TELEPHONE (OUTPATIENT)
Dept: FAMILY MEDICINE CLINIC | Facility: CLINIC | Age: 65
End: 2020-12-28

## 2020-12-28 ENCOUNTER — TELEPHONE (OUTPATIENT)
Dept: GASTROENTEROLOGY | Facility: CLINIC | Age: 65
End: 2020-12-28

## 2020-12-28 DIAGNOSIS — Z00.00 PREVENTATIVE HEALTH CARE: ICD-10-CM

## 2020-12-28 DIAGNOSIS — F41.9 ANXIETY: ICD-10-CM

## 2020-12-28 DIAGNOSIS — K21.9 GASTROESOPHAGEAL REFLUX DISEASE WITHOUT ESOPHAGITIS: ICD-10-CM

## 2020-12-28 DIAGNOSIS — R13.19 ESOPHAGEAL DYSPHAGIA: ICD-10-CM

## 2020-12-28 RX ORDER — CITALOPRAM 40 MG/1
40 TABLET ORAL DAILY
Qty: 14 TABLET | Refills: 1 | Status: SHIPPED | OUTPATIENT
Start: 2020-12-28 | End: 2020-12-29 | Stop reason: SDUPTHER

## 2020-12-28 RX ORDER — PANTOPRAZOLE SODIUM 40 MG/1
40 TABLET, DELAYED RELEASE ORAL DAILY
Qty: 90 TABLET | Refills: 2 | Status: SHIPPED | OUTPATIENT
Start: 2020-12-28 | End: 2021-02-18 | Stop reason: SDUPTHER

## 2020-12-29 ENCOUNTER — HOSPITAL ENCOUNTER (OUTPATIENT)
Dept: INFUSION CENTER | Facility: CLINIC | Age: 65
Discharge: HOME | End: 2020-12-29
Payer: MEDICARE

## 2020-12-29 VITALS
SYSTOLIC BLOOD PRESSURE: 145 MMHG | OXYGEN SATURATION: 100 % | TEMPERATURE: 98.6 F | DIASTOLIC BLOOD PRESSURE: 98 MMHG | RESPIRATION RATE: 18 BRPM | HEART RATE: 78 BPM

## 2020-12-29 DIAGNOSIS — F41.9 ANXIETY: ICD-10-CM

## 2020-12-29 DIAGNOSIS — C91.10 CLL (CHRONIC LYMPHOCYTIC LEUKEMIA) (HCC): Primary | ICD-10-CM

## 2020-12-29 LAB
ALBUMIN SERPL BCP-MCNC: 4 G/DL (ref 3.5–5.7)
ALP SERPL-CCNC: 70 U/L (ref 46–116)
ALT SERPL W P-5'-P-CCNC: 49 U/L (ref 12–78)
ANION GAP SERPL CALCULATED.3IONS-SCNC: 5 MMOL/L (ref 4–13)
AST SERPL W P-5'-P-CCNC: 36 U/L (ref 5–45)
BASOPHILS # BLD MANUAL: 0.15 THOUSAND/UL (ref 0–0.1)
BASOPHILS NFR MAR MANUAL: 3 % (ref 0–1)
BILIRUB SERPL-MCNC: 0.7 MG/DL (ref 0.2–1)
BUN SERPL-MCNC: 18 MG/DL (ref 5–25)
CALCIUM SERPL-MCNC: 9.6 MG/DL (ref 8.3–10.1)
CHLORIDE SERPL-SCNC: 109 MMOL/L (ref 98–108)
CO2 SERPL-SCNC: 29 MMOL/L (ref 21–32)
CREAT SERPL-MCNC: 1.4 MG/DL (ref 0.6–1.3)
EOSINOPHIL # BLD MANUAL: 0.25 THOUSAND/UL (ref 0–0.4)
EOSINOPHIL NFR BLD MANUAL: 5 % (ref 0–6)
ERYTHROCYTE [DISTWIDTH] IN BLOOD BY AUTOMATED COUNT: 13.8 % (ref 11.6–15.1)
GFR SERPL CREATININE-BSD FRML MDRD: 52 ML/MIN/1.73SQ M
GLUCOSE SERPL-MCNC: 172 MG/DL (ref 65–140)
HCT VFR BLD AUTO: 43.2 % (ref 36.5–49.3)
HGB BLD-MCNC: 14.5 G/DL (ref 12–17)
HGB RETIC QN AUTO: 35 PG (ref 30–38.3)
IGG SERPL-MCNC: 484 MG/DL (ref 700–1600)
IMM RETICS NFR: 12.7 % (ref 0–14)
LDH SERPL-CCNC: 239 U/L (ref 81–234)
LG PLATELETS BLD QL SMEAR: PRESENT
LYMPHOCYTES # BLD AUTO: 0.8 THOUSAND/UL (ref 0.6–4.47)
LYMPHOCYTES # BLD AUTO: 16 % (ref 14–44)
MCH RBC QN AUTO: 30.5 PG (ref 26.8–34.3)
MCHC RBC AUTO-ENTMCNC: 33.6 G/DL (ref 31.4–37.4)
MCV RBC AUTO: 91 FL (ref 82–98)
MONOCYTES # BLD AUTO: 0.75 THOUSAND/UL (ref 0–1.22)
MONOCYTES NFR BLD: 15 % (ref 4–12)
NEUTROPHILS # BLD MANUAL: 3.07 THOUSAND/UL (ref 1.85–7.62)
NEUTS BAND NFR BLD MANUAL: 2 % (ref 0–8)
NEUTS SEG NFR BLD AUTO: 59 % (ref 43–75)
PLATELET # BLD AUTO: 148 THOUSANDS/UL (ref 149–390)
PLATELET BLD QL SMEAR: ABNORMAL
PMV BLD AUTO: 11.4 FL (ref 8.9–12.7)
POTASSIUM SERPL-SCNC: 4.4 MMOL/L (ref 3.5–5.3)
PROT SERPL-MCNC: 6.6 G/DL (ref 6.4–8.2)
RBC # BLD AUTO: 4.75 MILLION/UL (ref 3.88–5.62)
RBC MORPH BLD: NORMAL
RETICS # AUTO: ABNORMAL 10*3/UL (ref 14356–105094)
RETICS # CALC: 2.18 % (ref 0.37–1.87)
SODIUM SERPL-SCNC: 143 MMOL/L (ref 136–145)
TOTAL CELLS COUNTED SPEC: 100
URATE SERPL-MCNC: 4.6 MG/DL (ref 4.2–8)
WBC # BLD AUTO: 5.03 THOUSAND/UL (ref 4.31–10.16)

## 2020-12-29 PROCEDURE — 96413 CHEMO IV INFUSION 1 HR: CPT

## 2020-12-29 PROCEDURE — 83615 LACTATE (LD) (LDH) ENZYME: CPT | Performed by: INTERNAL MEDICINE

## 2020-12-29 PROCEDURE — 82784 ASSAY IGA/IGD/IGG/IGM EACH: CPT | Performed by: INTERNAL MEDICINE

## 2020-12-29 PROCEDURE — 80053 COMPREHEN METABOLIC PANEL: CPT | Performed by: INTERNAL MEDICINE

## 2020-12-29 PROCEDURE — 85046 RETICYTE/HGB CONCENTRATE: CPT | Performed by: INTERNAL MEDICINE

## 2020-12-29 PROCEDURE — 84550 ASSAY OF BLOOD/URIC ACID: CPT | Performed by: INTERNAL MEDICINE

## 2020-12-29 PROCEDURE — 82232 ASSAY OF BETA-2 PROTEIN: CPT | Performed by: INTERNAL MEDICINE

## 2020-12-29 PROCEDURE — 96367 TX/PROPH/DG ADDL SEQ IV INF: CPT

## 2020-12-29 PROCEDURE — 85007 BL SMEAR W/DIFF WBC COUNT: CPT | Performed by: INTERNAL MEDICINE

## 2020-12-29 PROCEDURE — 96415 CHEMO IV INFUSION ADDL HR: CPT

## 2020-12-29 PROCEDURE — 85027 COMPLETE CBC AUTOMATED: CPT | Performed by: INTERNAL MEDICINE

## 2020-12-29 RX ORDER — SODIUM CHLORIDE 9 MG/ML
20 INJECTION, SOLUTION INTRAVENOUS ONCE
Status: COMPLETED | OUTPATIENT
Start: 2020-12-29 | End: 2020-12-29

## 2020-12-29 RX ORDER — CITALOPRAM 40 MG/1
40 TABLET ORAL DAILY
Qty: 14 TABLET | Refills: 1 | Status: SHIPPED | OUTPATIENT
Start: 2020-12-29 | End: 2021-05-25 | Stop reason: SDUPTHER

## 2020-12-29 RX ORDER — ACETAMINOPHEN 325 MG/1
650 TABLET ORAL ONCE
Status: COMPLETED | OUTPATIENT
Start: 2020-12-29 | End: 2020-12-29

## 2020-12-29 RX ADMIN — DIPHENHYDRAMINE HYDROCHLORIDE 25 MG: 50 INJECTION, SOLUTION INTRAMUSCULAR; INTRAVENOUS at 09:44

## 2020-12-29 RX ADMIN — OBINUTUZUMAB 1000 MG: 1000 INJECTION, SOLUTION, CONCENTRATE INTRAVENOUS at 10:30

## 2020-12-29 RX ADMIN — DEXAMETHASONE SODIUM PHOSPHATE 20 MG: 10 INJECTION, SOLUTION INTRAMUSCULAR; INTRAVENOUS at 09:23

## 2020-12-29 RX ADMIN — ACETAMINOPHEN 650 MG: 325 TABLET, FILM COATED ORAL at 09:20

## 2020-12-29 RX ADMIN — SODIUM CHLORIDE 20 ML/HR: 0.9 INJECTION, SOLUTION INTRAVENOUS at 09:20

## 2020-12-29 NOTE — PLAN OF CARE
Problem: Potential for Falls  Goal: Patient will remain free of falls  Description: INTERVENTIONS:  - Assess patient frequently for physical needs  -  Identify cognitive and physical deficits and behaviors that affect risk of falls    -  [place] fall precautions as indicated by assessment   - Educate patient/family on patient safety including physical limitations  - Instruct patient to call for assistance with activity based on assessment  - Modify environment to reduce risk of injury  - Consider OT/PT consult to assist with strengthening/mobility  Outcome: Progressing

## 2020-12-30 DIAGNOSIS — I10 ESSENTIAL HYPERTENSION: ICD-10-CM

## 2020-12-30 RX ORDER — AMLODIPINE BESYLATE 10 MG/1
10 TABLET ORAL DAILY
Qty: 14 TABLET | Refills: 3 | Status: SHIPPED | OUTPATIENT
Start: 2020-12-30 | End: 2021-02-26 | Stop reason: SDUPTHER

## 2020-12-31 LAB — B2 MICROGLOB SERPL-MCNC: 2.4 MG/L (ref 0.6–2.4)

## 2021-01-06 DIAGNOSIS — I10 ESSENTIAL HYPERTENSION: ICD-10-CM

## 2021-01-06 DIAGNOSIS — J45.909 MODERATE ASTHMA, UNSPECIFIED WHETHER COMPLICATED, UNSPECIFIED WHETHER PERSISTENT: ICD-10-CM

## 2021-01-06 RX ORDER — FUROSEMIDE 40 MG/1
TABLET ORAL
Qty: 90 TABLET | Refills: 5 | Status: SHIPPED | OUTPATIENT
Start: 2021-01-06 | End: 2022-04-15 | Stop reason: HOSPADM

## 2021-01-08 ENCOUNTER — TELEPHONE (OUTPATIENT)
Dept: SURGICAL ONCOLOGY | Facility: CLINIC | Age: 66
End: 2021-01-08

## 2021-01-08 NOTE — TELEPHONE ENCOUNTER
Patient receives IVIG on 3/9/21, this particular therapy plan can be scheduled so far out  Once he has his treatment in February, his order for March will drop in the infusion center depo  The infusion center will check their depo the week before and then schedule accordingly  I reached out to Zelda Smith and left a message  I explained briefly and asked that he call back the [facility] line if he had anymore questions

## 2021-01-08 NOTE — TELEPHONE ENCOUNTER
Pt called because he said he is missing an infusion which should have been scheduled on [facility], 2021  Please call him to schedule this infusion per patient      He can be reached at [phone]

## 2021-01-12 ENCOUNTER — HOSPITAL ENCOUNTER (OUTPATIENT)
Dept: INFUSION CENTER | Facility: CLINIC | Age: 66
Discharge: HOME | End: 2021-01-12
Payer: MEDICARE

## 2021-01-12 ENCOUNTER — APPOINTMENT (OUTPATIENT)
Dept: LAB | Facility: CLINIC | Age: 66
End: 2021-01-12
Payer: MEDICARE

## 2021-01-12 VITALS
WEIGHT: 235.89 LBS | TEMPERATURE: 97.2 F | HEART RATE: 80 BPM | RESPIRATION RATE: 18 BRPM | SYSTOLIC BLOOD PRESSURE: 151 MMHG | BODY MASS INDEX: 31.12 KG/M2 | DIASTOLIC BLOOD PRESSURE: 78 MMHG

## 2021-01-12 DIAGNOSIS — D80.1 HYPOGAMMAGLOBULINEMIA, ACQUIRED (HCC): ICD-10-CM

## 2021-01-12 DIAGNOSIS — Z79.4 TYPE 2 DIABETES MELLITUS WITHOUT COMPLICATION, WITH LONG-TERM CURRENT USE OF INSULIN (HCC): ICD-10-CM

## 2021-01-12 DIAGNOSIS — E78.2 MIXED HYPERLIPIDEMIA: ICD-10-CM

## 2021-01-12 DIAGNOSIS — C91.10 CLL (CHRONIC LYMPHOCYTIC LEUKEMIA) (HCC): Primary | ICD-10-CM

## 2021-01-12 DIAGNOSIS — E11.9 TYPE 2 DIABETES MELLITUS WITHOUT COMPLICATION, WITH LONG-TERM CURRENT USE OF INSULIN (HCC): ICD-10-CM

## 2021-01-12 LAB
CHOLEST SERPL-MCNC: 180 MG/DL (ref 50–200)
EST. AVERAGE GLUCOSE BLD GHB EST-MCNC: 186 MG/DL
HBA1C MFR BLD: 8.1 %
HDLC SERPL-MCNC: 30 MG/DL
LDLC SERPL CALC-MCNC: 130 MG/DL (ref 0–100)
TRIGL SERPL-MCNC: 100 MG/DL

## 2021-01-12 PROCEDURE — 96375 TX/PRO/DX INJ NEW DRUG ADDON: CPT

## 2021-01-12 PROCEDURE — 80061 LIPID PANEL: CPT

## 2021-01-12 PROCEDURE — 83036 HEMOGLOBIN GLYCOSYLATED A1C: CPT

## 2021-01-12 PROCEDURE — 36415 COLL VENOUS BLD VENIPUNCTURE: CPT

## 2021-01-12 PROCEDURE — 96366 THER/PROPH/DIAG IV INF ADDON: CPT

## 2021-01-12 PROCEDURE — 96367 TX/PROPH/DG ADDL SEQ IV INF: CPT

## 2021-01-12 PROCEDURE — 96365 THER/PROPH/DIAG IV INF INIT: CPT

## 2021-01-12 RX ORDER — ACETAMINOPHEN 325 MG/1
650 TABLET ORAL ONCE
Status: COMPLETED | OUTPATIENT
Start: 2021-01-12 | End: 2021-01-12

## 2021-01-12 RX ORDER — SODIUM CHLORIDE 9 MG/ML
20 INJECTION, SOLUTION INTRAVENOUS ONCE
Status: CANCELLED | OUTPATIENT
Start: 2021-02-09

## 2021-01-12 RX ORDER — SODIUM CHLORIDE 9 MG/ML
20 INJECTION, SOLUTION INTRAVENOUS ONCE
Status: COMPLETED | OUTPATIENT
Start: 2021-01-12 | End: 2021-01-12

## 2021-01-12 RX ORDER — ACETAMINOPHEN 325 MG/1
650 TABLET ORAL ONCE
Status: CANCELLED | OUTPATIENT
Start: 2021-02-09

## 2021-01-12 RX ADMIN — HYDROCORTISONE SODIUM SUCCINATE 100 MG: 100 INJECTION, POWDER, FOR SOLUTION INTRAMUSCULAR; INTRAVENOUS at 09:09

## 2021-01-12 RX ADMIN — DIPHENHYDRAMINE HYDROCHLORIDE 12.5 MG: 50 INJECTION, SOLUTION INTRAMUSCULAR; INTRAVENOUS at 08:47

## 2021-01-12 RX ADMIN — SODIUM CHLORIDE 20 ML/HR: 0.9 INJECTION, SOLUTION INTRAVENOUS at 08:45

## 2021-01-12 RX ADMIN — ACETAMINOPHEN 650 MG: 325 TABLET, FILM COATED ORAL at 08:47

## 2021-01-12 RX ADMIN — Medication 22.5 G: at 09:17

## 2021-01-12 NOTE — PLAN OF CARE
Problem: Potential for Falls  Goal: Patient will remain free of falls  Description: INTERVENTIONS:  - Assess patient frequently for physical needs  -  Identify cognitive and physical deficits and behaviors that affect risk of falls    -  [place] fall precautions as indicated by assessment   - Educate patient/family on patient safety including physical limitations  - Instruct patient to call for assistance with activity based on assessment  - Modify environment to reduce risk of injury  - Consider OT/PT consult to assist with strengthening/mobility  Outcome: Progressing

## 2021-01-12 NOTE — PROGRESS NOTES
Patient tolerated infusion without complications  Declined AVS, aware of next scheduled appointment

## 2021-01-18 ENCOUNTER — TELEPHONE (OUTPATIENT)
Dept: ENDOCRINOLOGY | Facility: CLINIC | Age: 66
End: 2021-01-18

## 2021-01-18 DIAGNOSIS — E11.9 TYPE 2 DIABETES MELLITUS WITHOUT COMPLICATION, WITH LONG-TERM CURRENT USE OF INSULIN (HCC): Primary | ICD-10-CM

## 2021-01-18 DIAGNOSIS — Z79.4 TYPE 2 DIABETES MELLITUS WITHOUT COMPLICATION, WITH LONG-TERM CURRENT USE OF INSULIN (HCC): Primary | ICD-10-CM

## 2021-01-18 NOTE — TELEPHONE ENCOUNTER
----- Message from Maximilian Ignacio PA-C sent at 1/18/2021 11:10 AM EST -----  A1C high at 8 1-- metformin recently started please send BG readings for review  Labs were done too soon were supposed to be done in 3 months after starting metformin  Cholesterol still high, he was working on dietary improvements

## 2021-01-19 LAB
LEFT EYE DIABETIC RETINOPATHY: NORMAL
RIGHT EYE DIABETIC RETINOPATHY: NORMAL

## 2021-01-19 RX ORDER — ACETAMINOPHEN 325 MG/1
650 TABLET ORAL ONCE
Status: CANCELLED | OUTPATIENT
Start: 2021-01-26

## 2021-01-19 RX ORDER — SODIUM CHLORIDE 9 MG/ML
20 INJECTION, SOLUTION INTRAVENOUS ONCE
Status: CANCELLED | OUTPATIENT
Start: 2021-01-26

## 2021-01-20 ENCOUNTER — TELEMEDICINE (OUTPATIENT)
Dept: HEMATOLOGY ONCOLOGY | Facility: CLINIC | Age: 66
End: 2021-01-20
Payer: MEDICARE

## 2021-01-20 VITALS
DIASTOLIC BLOOD PRESSURE: 68 MMHG | HEART RATE: 64 BPM | BODY MASS INDEX: 30.88 KG/M2 | HEIGHT: 73 IN | WEIGHT: 233 LBS | TEMPERATURE: 97.7 F | SYSTOLIC BLOOD PRESSURE: 128 MMHG | OXYGEN SATURATION: 97 % | RESPIRATION RATE: 14 BRPM

## 2021-01-20 DIAGNOSIS — C91.10 CLL (CHRONIC LYMPHOCYTIC LEUKEMIA) (HCC): Primary | ICD-10-CM

## 2021-01-20 PROCEDURE — 99441 PR PHYS/QHP TELEPHONE EVALUATION 5-10 MIN: CPT | Performed by: PHYSICIAN ASSISTANT

## 2021-01-20 NOTE — PROGRESS NOTES
Virtual Brief Visit    Assessment/Plan:    Problem List Items Addressed This Visit     None                Reason for visit is   Chief Complaint   Patient presents with    Follow-up        Encounter provider Mary Peralta PA-C    Provider located at [address] [place] [id number]    Recent Visits  No visits were found meeting these conditions  Showing recent visits within past 7 days and meeting all other requirements     Today's Visits  Date Type Provider Dept   01/20/21 Telemedicine Mary Peralta PA-C [facility] [place]   Showing today's visits and meeting all other requirements     Future Appointments  No visits were found meeting these conditions  Showing future appointments within next 150 days and meeting all other requirements      After connecting through telephone, the patient was identified by name and date of birth  Coleman Vargas was informed that this is a telemedicine visit and that the visit is being conducted through telephone  My office door was closed  No one else was in the room  He acknowledged consent and understanding of privacy and security of the platform  The patient has agreed to participate and understands he can discontinue the visit at any time  Patient is aware this is a billable service  Subjective  Coleman Vargas is a 76 y o  male presents for follow up for CLL  41-year-old male presents for follow-up regarding a longstanding history of CLL as well as hemolytic anemia related to his CLL  On 3/20/17 patient found to have hemoglobin of 6 2, ,000  He was admitted to [facility] for blood transfusion and plan to transfer to [address] [place] [address]  On 3/20/17 WBC count 195,000, hemoglobin 4 9, platelet count 65  Direct coomb's was positive with undetectable haptoglobin   He was given 2 units of PRBCs, Solu-Medrol 1 g ×3 days and IVIG 1 g/kg daily ×3 days  His hemoglobin continued to drop  Bone marrow biopsy on 3/21 showed marrow cellularity of greater than 95% almost replaced by small lymphocytes, lambda light chain restricted, CD20 positive, CD19 positive, CD23 positive partially expressing CD11c and CD25 and CD5 positive and negative for CD 79 and CD38  He was treated with single dose of chlorambucil to control his CLL   3/22 second dose of chlorambucil, also Rituxan 375 mg/M ² autoimmune hemolytic anemia  WBC continued to rise, 266,000  Patient initiated with Venetoclax 20 mg daily  Tumor lysis monitored every 8 hours; given aggressive hydration and Lasix and remained euvolemic  3/24WBC dropped to 112,000  3/25- WBC 63,000  3/26WBC 15,000, , platelet count 83,001  Patient became febrile, 101 3  The cefepime initiated Venetoclax held  3/28patient fell from bed, CT head negative  ANC 1200, cefepime discontinued and Levaquin 75 mg daily started sliding 3/29 given second dose of rituximab 375 mg/m ²   3/30WBC meg to 14 5 thousand, platelets 41,281, Venetoclax restarted 10 mg every other day  3/31 WBC 4 4, , platelet count 93,453  4/14/4: WBC maintained less than 10,000, ANC and platelet count meg  He was discharged on Venetoclax 10 mg every other day  He was instructed to discontinue simvastatin, Ranexa, aspirin, metoprolol 50 mg q  day, losartan 50 mg q  day, Plavix 75 mg q  day, folic acid 976 µg b i d , prednisone 60 mg, allopurinol  He was advised to continue Levaquin 750 mg daily for 10 days, Lexapro 10 mg daily, fenofibrate 50 mg daily, gabapentin 100 mg twice daily, Protonix 40 mg daily     Imaging studies performed at [facility]:  Patient had echocardiogram while inpatient at [facility] on 3/21  This study was unremarkable    CT chest abdomen pelvis without contrast on 3/24 showed stable pulmonary nodules, patchy groundglass densities of lower lobes previously identified and which may represent volume loss or early infiltrate  Persistent diffuse bronchial wall thickening suggesting acute/chronic bronchitis changes  No bronchiectasis  No masses  Stable lymphadenopathy of mediastinum  Stable axillary lymphadenopathy  Splenomegaly 23 9 cm  Extensive lymphadenopathy throughout the entire retroperitoneal, small bowel mesentery, and pelvis  Largest lymph node in right lower quadrant measured 4 6 x 4 8  Stable enlarged left para-aortic lymph node measuring 6 2 x 6 8   4/12/17: Patient received one dose of Rituxan on 4/7/17  Venetoclax 10 mg every other day 4/5/17 - 4/9/17  Venetoclax 10 mg every day beginning 4/10/17  Monitoring CBC 3 times per week  4/28/17: patient had follow-up appointment at [facility] with Dr Kodak Freeman  She recommended to slowly increase dose of veneteclax  Also, she recommended as he has had numerous treatments with Rituxan, if antibody directed therapy becomes indicated in the future recommendation was with [facility]  She will be seeing her on a p r n  Basis      July 2017- Hemolysis  Disease not under control with veneteclax  Started prednisone 60 mg daily, folic acid, IBRUTINIB 461 mg daily and Gazyva       Sept 2017- 9/18-9/20 patient was admitted due to uncontrolled hyperglycemia with new onset DM type II due to chronic prednisone use for CLL/hemolytic anemia; also found to have profound neutropenia  Ibrutinib dose was reduced to 280 mg daily      October 2017- 10/7/17 - 10/14/17 patient was admitted due to cellulitis on his scalp     Dec 2017- Jw Nose decreased to 140 mg PO daily due to thrombocytopenia      4/23 - 4/27 patient was admitted due to listeria bacteremia  He was discharged on IV ampicillin  Echo negative for vegetations  Imbruvica and [facility] was on hold at that time  Repeat CBC on 5/14/18 showed normal ANC, and hemoglobin  Platelets adequate at 83K      10/18/18 the patient EGD   This showed distal esophageal stricture   This was dilated to 18 mm using a balloon dilator  [facility] could not dilate any further due to bleeding secondary to thrombocytopenia        Past Medical History:   Diagnosis Date    Anemia     Arthritis     CAD (coronary artery disease) 2004    Cellulitis of scalp     CKD (chronic kidney disease) stage 3, GFR 30-59 ml/min     [facility] [address] [facility]     COPD (chronic obstructive pulmonary disease) ([facility] [address] [facility]     Diabetes mellitus ([facility] [address] )     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    Dyslipidemia     Edema extremities     Esophageal ulcer     H/O blood clots     Hemolytic anemia (HCC)     Hiatal hernia     History of TIA (transient ischemic attack)     Hyperlipidemia     Hypertension     Listeria infection 2018    Multiple gastric ulcers     Myocardial infarction ([facility] [address] [facility] 2004    acute    Neutropenia (HCC)     Obese     Recurrent sinusitis     Sleep apnea     Thrombocytopenia (HCC)     Vertigo        Past Surgical History:   Procedure Laterality Date    ARTHROSCOPIC REPAIR ACL      lt knee    CARDIAC SURGERY      cardiac cath with stent    FOOT SURGERY      IR PORT CHECK  5/17/2019    KNEE ARTHROSCOPY      OTHER SURGICAL HISTORY      cardiac cath lesion 1, 1st adjunct treat device: stent    PORTACATH PLACEMENT      [place] ESOPHAGOGASTRODUODENOSCOPY TRANSORAL DIAGNOSTIC N/A 10/5/2017    Procedure: ESOPHAGOGASTRODUODENOSCOPY (EGD) with bx;  Surgeon: Jadyn Krishnamurthy DO;  [facility]; Service: Gastroenterology    [place] ESOPHAGOGASTRODUODENOSCOPY TRANSORAL DIAGNOSTIC N/A 3/8/2018    Procedure: ESOPHAGOGASTRODUODENOSCOPY (EGD) with biopsy;  Surgeon: Jadyn Krishnamurthy DO;  [facility]; Service: Gastroenterology    [place] ESOPHAGOGASTRODUODENOSCOPY TRANSORAL DIAGNOSTIC N/A 6/20/2018    Procedure: ESOPHAGOGASTRODUODENOSCOPY (EGD) with Dilation;  Surgeon: Jadyn Krishnamurthy DO;  [facility];   Service: Gastroenterology    FL ESOPHAGOGASTRODUODENOSCOPY TRANSORAL DIAGNOSTIC N/A 10/18/2018    Procedure: ESOPHAGOGASTRODUODENOSCOPY (EGD) with dilation;  Surgeon: Juan Francisco Holland MD;  [facility];   Service: Gastroenterology    [place] ESOPHAGOSCOPY FLEXIBLE TRANSORAL WITH BIOPSY N/A 9/2/2016    Procedure: ESOPHAGOGASTRODUODENOSCOPY (EGD); ESOPHAGEAL DILATION; ESOPHAGEAL BIOPSY;  Surgeon: Monique North MD;  [facility];  Service: Thoracic    TONSILLECTOMY      UPPER GASTROINTESTINAL ENDOSCOPY      x 6       Current Outpatient Medications   Medication Sig Dispense Refill    acyclovir (ZOVIRAX) 400 MG tablet Take 1 tablet (400 mg total) by mouth 2 (two) times a day 60 tablet 5    amLODIPine (NORVASC) 10 mg tablet Take 1 tablet (10 mg total) by mouth daily 14 tablet 3    aspirin 81 MG tablet Take 81 mg by mouth every other day Every other day       citalopram (CeleXA) 40 mg tablet Take 1 tablet (40 mg total) by mouth daily for 28 days 14 tablet 1    fenofibrate (TRICOR) 145 mg tablet TAKE 1 TABLET DAILY 90 tablet 5    folic acid (FOLVITE) 1 mg tablet Take 400 mcg by mouth daily       furosemide (LASIX) 40 mg tablet TAKE 1 TABLET DAILY 90 tablet 5    gabapentin (NEURONTIN) 600 MG tablet TAKE 1 TABLET DAILY (Patient not taking: Reported on 10/28/2020) 90 tablet 5    gabapentin (NEURONTIN) 600 MG tablet Take 1 tablet (600 mg total) by mouth daily 30 tablet 0    glucose monitoring kit (FREESTYLE) monitoring kit 1 each by Does not apply route as needed ( ) E 11 9 1 each 0    HUMALOG KWIKPEN 100 units/mL injection pen USE 5-10 UNITS BEFORE MEALS AS NEEDED FOR STEROID INDUCED HYPERGLYCEMIA 15 mL 1    Ibrutinib 140 MG TABS Take 140 mg by mouth daily 30 tablet 11    insulin lispro (HUMALOG KWIKPEN) 100 units/mL injection pen Use 5-10 units before meals as needed for steroid induced hyperglycemia (Patient not taking: Reported on 10/28/2020) 5 pen 1    Insulin Syringe-Needle U-100 30G X 1/2" 0 3 ML MISC by Does not apply route 2 (two) times a day 100 each 6    Lancets (FREESTYLE) lancets Use as instructed--test 2 times a day    E 11 9 100 each 0    LORazepam (ATIVAN) 0 5 mg tablet Take 1 tablet (0 5 mg total) by mouth daily as needed for anxiety 30 tablet 0    losartan (COZAAR) 100 MG tablet Take 1 tablet (100 mg total) by mouth daily 90 tablet 1    losartan (COZAAR) 50 mg tablet TAKE ONE-HALF (1/2) TABLET DAILY (Patient not taking: Reported on 10/28/2020) 90 tablet 5    metFORMIN (GLUCOPHAGE-XR) 500 mg 24 hr tablet Take 2 tablets (1,000 mg total) by mouth daily with dinner 60 tablet 1    multivitamin (THERAGRAN) TABS Take 1 tablet by mouth daily      naloxone (NARCAN) 4 mg/0 1 mL nasal spray Administer 1 spray into a nostril  If breathing does not return to normal or if breathing difficulty resumes after 2-3 minutes, give another dose in the other nostril using a new spray  1 each 1    obinutuzumab (GAZYVA) 1000 mg/40 mL SOLN Infuse into a venous catheter      oxyCODONE (ROXICODONE) 10 MG TABS Take 1 tablet (10 mg total) by mouth every 6 (six) hours as needed for moderate pain For ongoing pain controlMax Daily Amount: 40 mg 90 tablet 0    pantoprazole (PROTONIX) 40 mg tablet Take 1 tablet (40 mg total) by mouth daily 90 tablet 2    Potassium (POTASSIMIN PO) Take 20 mEq by mouth daily      predniSONE 5 mg tablet Take 1 tablet (5 mg total) by mouth daily 90 tablet 2    sodium chloride, PF, 0 9 % 10 mL by Intracatheter route see administration instructions 10mL into port before and after ampicillin doses  0    Ventolin  (90 Base) MCG/ACT inhaler USE 2 INHALATIONS FOUR TIMES A DAY AS NEEDED FOR WHEEZING 36 g 5    zolpidem (AMBIEN) 5 mg tablet Take 1 tablet (5 mg total) by mouth daily at bedtime as needed for sleep 30 tablet 0     No current facility-administered medications for this visit           Allergies   Allergen Reactions    Heparin Other (See Comments)     Other reaction(s): Blood Disorders  clot    Macrolides And Ketolides Other (See Comments)     Interacts with other meds he is taking     Review of Systems Constitutional: Negative for appetite change, chills, fatigue, fever and unexpected weight change  HENT: Negative for mouth sores and nosebleeds  Respiratory: Negative for cough and shortness of breath  Cardiovascular: Negative for chest pain, palpitations and leg swelling  Gastrointestinal: Negative for abdominal pain, constipation, diarrhea, nausea and vomiting  Genitourinary: Negative for difficulty urinating, dysuria and hematuria  Musculoskeletal: Positive for arthralgias  Skin: Negative  Neurological: Negative for dizziness, weakness, light-headedness, numbness and headaches  Hematological: Negative  Psychiatric/Behavioral: Negative  There were no vitals filed for this visit  1  CLL (chronic lymphocytic leukemia) [facility])  50-year-old male presents for follow-up regarding history of CLL  He is maintained on Imbruvica 140 mg PO daily  He is on Gazyva every 28 days, IVIG every 28 days as well  He also remains on prednisone 5 mg PO daily  He does not need any refills  Disease remains under control  No change in therapy  He asked about COVID19 vaccine  Reviewed that it is recommended to have vaccine in cancer patients however with patients with CLL it is unknown if these patients will develop sufficient immune response due to immune dysfunction from CLL  He is hesitant to begin with due to not tolerating flu shot  At this time he has chosen to wait for vaccine  He is very much following COVID19 precautions as well as his family members who he lives with  Continue labs  Follow up in 3 months  - Infusion Calculated Appointment Request; Future  - CBC and differential; Future  - Comprehensive metabolic panel; Future    I spent 10 minutes directly with the patient during this visit    Keshia acknowledges that he has consented to an online visit or consultation   He understands that the online visit is based solely on information provided by him, and that, in the absence of a face-to-face physical evaluation by the physician, the diagnosis he receives is both limited and provisional in terms of accuracy and completeness  This is not intended to replace a full medical face-to-face evaluation by the physician  Meg Aschoff understands and accepts these terms

## 2021-01-26 ENCOUNTER — HOSPITAL ENCOUNTER (OUTPATIENT)
Dept: INFUSION CENTER | Facility: CLINIC | Age: 66
Discharge: HOME | End: 2021-01-26
Payer: MEDICARE

## 2021-01-26 VITALS
DIASTOLIC BLOOD PRESSURE: 75 MMHG | HEART RATE: 80 BPM | WEIGHT: 233.91 LBS | HEIGHT: 73 IN | TEMPERATURE: 98.6 F | SYSTOLIC BLOOD PRESSURE: 154 MMHG | BODY MASS INDEX: 31 KG/M2

## 2021-01-26 DIAGNOSIS — C91.10 CLL (CHRONIC LYMPHOCYTIC LEUKEMIA) (HCC): Primary | ICD-10-CM

## 2021-01-26 LAB
ALBUMIN SERPL BCP-MCNC: 3.6 G/DL (ref 3.5–5.7)
ALP SERPL-CCNC: 64 U/L (ref 46–116)
ALT SERPL W P-5'-P-CCNC: 33 U/L (ref 12–78)
ANION GAP SERPL CALCULATED.3IONS-SCNC: 0 MMOL/L (ref 4–13)
AST SERPL W P-5'-P-CCNC: 32 U/L (ref 5–45)
BASOPHILS # BLD MANUAL: 0.05 THOUSAND/UL (ref 0–0.1)
BASOPHILS NFR MAR MANUAL: 1 % (ref 0–1)
BILIRUB SERPL-MCNC: 0.7 MG/DL (ref 0.2–1)
BUN SERPL-MCNC: 25 MG/DL (ref 5–25)
CALCIUM SERPL-MCNC: 9.8 MG/DL (ref 8.3–10.1)
CHLORIDE SERPL-SCNC: 109 MMOL/L (ref 98–108)
CO2 SERPL-SCNC: 30 MMOL/L (ref 21–32)
CREAT SERPL-MCNC: 1.3 MG/DL (ref 0.6–1.3)
EOSINOPHIL # BLD MANUAL: 0.19 THOUSAND/UL (ref 0–0.4)
EOSINOPHIL NFR BLD MANUAL: 4 % (ref 0–6)
ERYTHROCYTE [DISTWIDTH] IN BLOOD BY AUTOMATED COUNT: 13.4 % (ref 11.6–15.1)
GFR SERPL CREATININE-BSD FRML MDRD: 57 ML/MIN/1.73SQ M
GIANT PLATELETS BLD QL SMEAR: PRESENT
GLUCOSE SERPL-MCNC: 171 MG/DL (ref 65–140)
HCT VFR BLD AUTO: 42.9 % (ref 36.5–49.3)
HGB BLD-MCNC: 14.2 G/DL (ref 12–17)
HGB RETIC QN AUTO: 35.8 PG (ref 30–38.3)
IGG SERPL-MCNC: 506 MG/DL (ref 700–1600)
IMM RETICS NFR: 9.8 % (ref 0–14)
LDH SERPL-CCNC: 259 U/L (ref 81–234)
LG PLATELETS BLD QL SMEAR: PRESENT
LYMPHOCYTES # BLD AUTO: 0.58 THOUSAND/UL (ref 0.6–4.47)
LYMPHOCYTES # BLD AUTO: 12 % (ref 14–44)
MCH RBC QN AUTO: 30.1 PG (ref 26.8–34.3)
MCHC RBC AUTO-ENTMCNC: 33.1 G/DL (ref 31.4–37.4)
MCV RBC AUTO: 91 FL (ref 82–98)
MONOCYTES # BLD AUTO: 0.43 THOUSAND/UL (ref 0–1.22)
MONOCYTES NFR BLD: 9 % (ref 4–12)
NEUTROPHILS # BLD MANUAL: 3.57 THOUSAND/UL (ref 1.85–7.62)
NEUTS BAND NFR BLD MANUAL: 1 % (ref 0–8)
NEUTS SEG NFR BLD AUTO: 73 % (ref 43–75)
PLATELET # BLD AUTO: 150 THOUSANDS/UL (ref 149–390)
PLATELET BLD QL SMEAR: ABNORMAL
PMV BLD AUTO: 11.6 FL (ref 8.9–12.7)
POTASSIUM SERPL-SCNC: 4 MMOL/L (ref 3.5–5.3)
PROT SERPL-MCNC: 6.1 G/DL (ref 6.4–8.2)
RBC # BLD AUTO: 4.72 MILLION/UL (ref 3.88–5.62)
RBC MORPH BLD: NORMAL
RETICS # AUTO: NORMAL 10*3/UL (ref 14356–105094)
RETICS # CALC: 1.8 % (ref 0.37–1.87)
SODIUM SERPL-SCNC: 139 MMOL/L (ref 136–145)
TOTAL CELLS COUNTED SPEC: 100
URATE SERPL-MCNC: 5 MG/DL (ref 4.2–8)
WBC # BLD AUTO: 4.83 THOUSAND/UL (ref 4.31–10.16)

## 2021-01-26 PROCEDURE — 85027 COMPLETE CBC AUTOMATED: CPT | Performed by: INTERNAL MEDICINE

## 2021-01-26 PROCEDURE — 82784 ASSAY IGA/IGD/IGG/IGM EACH: CPT | Performed by: INTERNAL MEDICINE

## 2021-01-26 PROCEDURE — 96415 CHEMO IV INFUSION ADDL HR: CPT

## 2021-01-26 PROCEDURE — 80053 COMPREHEN METABOLIC PANEL: CPT | Performed by: INTERNAL MEDICINE

## 2021-01-26 PROCEDURE — 85007 BL SMEAR W/DIFF WBC COUNT: CPT | Performed by: INTERNAL MEDICINE

## 2021-01-26 PROCEDURE — 96367 TX/PROPH/DG ADDL SEQ IV INF: CPT

## 2021-01-26 PROCEDURE — 96413 CHEMO IV INFUSION 1 HR: CPT

## 2021-01-26 PROCEDURE — 83615 LACTATE (LD) (LDH) ENZYME: CPT | Performed by: INTERNAL MEDICINE

## 2021-01-26 PROCEDURE — 82232 ASSAY OF BETA-2 PROTEIN: CPT | Performed by: INTERNAL MEDICINE

## 2021-01-26 PROCEDURE — 85046 RETICYTE/HGB CONCENTRATE: CPT | Performed by: INTERNAL MEDICINE

## 2021-01-26 PROCEDURE — 84550 ASSAY OF BLOOD/URIC ACID: CPT | Performed by: INTERNAL MEDICINE

## 2021-01-26 RX ORDER — SODIUM CHLORIDE 9 MG/ML
20 INJECTION, SOLUTION INTRAVENOUS ONCE
Status: CANCELLED | OUTPATIENT
Start: 2021-02-09

## 2021-01-26 RX ORDER — ACETAMINOPHEN 325 MG/1
650 TABLET ORAL ONCE
Status: CANCELLED | OUTPATIENT
Start: 2021-02-09

## 2021-01-26 RX ORDER — ACETAMINOPHEN 325 MG/1
650 TABLET ORAL ONCE
Status: COMPLETED | OUTPATIENT
Start: 2021-01-26 | End: 2021-01-26

## 2021-01-26 RX ORDER — SODIUM CHLORIDE 9 MG/ML
20 INJECTION, SOLUTION INTRAVENOUS ONCE
Status: COMPLETED | OUTPATIENT
Start: 2021-01-26 | End: 2021-01-26

## 2021-01-26 RX ADMIN — DEXAMETHASONE SODIUM PHOSPHATE 20 MG: 10 INJECTION, SOLUTION INTRAMUSCULAR; INTRAVENOUS at 08:49

## 2021-01-26 RX ADMIN — ACETAMINOPHEN 650 MG: 325 TABLET, FILM COATED ORAL at 09:16

## 2021-01-26 RX ADMIN — DIPHENHYDRAMINE HYDROCHLORIDE 25 MG: 50 INJECTION, SOLUTION INTRAMUSCULAR; INTRAVENOUS at 09:17

## 2021-01-26 RX ADMIN — OBINUTUZUMAB 1000 MG: 1000 INJECTION, SOLUTION, CONCENTRATE INTRAVENOUS at 10:10

## 2021-01-26 RX ADMIN — SODIUM CHLORIDE 20 ML/HR: 0.9 INJECTION, SOLUTION INTRAVENOUS at 08:48

## 2021-01-26 NOTE — PROGRESS NOTES
Patient here for Gazyva infusion, tolerated well  Patient declined AVS, aware of follow-up appointments  Patient left infusion center in stable condition

## 2021-01-26 NOTE — PLAN OF CARE
Problem: Potential for Falls  Goal: Patient will remain free of falls  Description: INTERVENTIONS:  - Assess patient frequently for physical needs  -  Identify cognitive and physical deficits and behaviors that affect risk of falls    -  [place] fall precautions as indicated by assessment   - Educate patient/family on patient safety including physical limitations  - Instruct patient to call for assistance with activity based on assessment  - Modify environment to reduce risk of injury  - Consider OT/PT consult to assist with strengthening/mobility  Outcome: Progressing

## 2021-01-27 DIAGNOSIS — Z79.4 TYPE 2 DIABETES MELLITUS WITHOUT COMPLICATION, WITH LONG-TERM CURRENT USE OF INSULIN (HCC): ICD-10-CM

## 2021-01-27 DIAGNOSIS — E11.9 TYPE 2 DIABETES MELLITUS WITHOUT COMPLICATION, WITH LONG-TERM CURRENT USE OF INSULIN (HCC): ICD-10-CM

## 2021-01-27 RX ORDER — METFORMIN HYDROCHLORIDE 500 MG/1
1000 TABLET, EXTENDED RELEASE ORAL
Qty: 180 TABLET | Refills: 1 | Status: SHIPPED | OUTPATIENT
Start: 2021-01-27 | End: 2021-05-03

## 2021-01-28 LAB — B2 MICROGLOB SERPL-MCNC: 2.2 MG/L (ref 0.6–2.4)

## 2021-02-09 ENCOUNTER — HOSPITAL ENCOUNTER (OUTPATIENT)
Dept: INFUSION CENTER | Facility: CLINIC | Age: 66
Discharge: HOME | End: 2021-02-09
Payer: MEDICARE

## 2021-02-09 VITALS
DIASTOLIC BLOOD PRESSURE: 80 MMHG | WEIGHT: 234.79 LBS | TEMPERATURE: 97.9 F | SYSTOLIC BLOOD PRESSURE: 160 MMHG | RESPIRATION RATE: 16 BRPM | BODY MASS INDEX: 30.98 KG/M2 | HEART RATE: 72 BPM

## 2021-02-09 DIAGNOSIS — C91.10 CLL (CHRONIC LYMPHOCYTIC LEUKEMIA) (HCC): Primary | ICD-10-CM

## 2021-02-09 DIAGNOSIS — D80.1 HYPOGAMMAGLOBULINEMIA, ACQUIRED (HCC): ICD-10-CM

## 2021-02-09 PROCEDURE — 96366 THER/PROPH/DIAG IV INF ADDON: CPT

## 2021-02-09 PROCEDURE — 96375 TX/PRO/DX INJ NEW DRUG ADDON: CPT

## 2021-02-09 PROCEDURE — 96367 TX/PROPH/DG ADDL SEQ IV INF: CPT

## 2021-02-09 PROCEDURE — 96365 THER/PROPH/DIAG IV INF INIT: CPT

## 2021-02-09 RX ORDER — SODIUM CHLORIDE 9 MG/ML
20 INJECTION, SOLUTION INTRAVENOUS ONCE
Status: CANCELLED | OUTPATIENT
Start: 2021-03-09

## 2021-02-09 RX ORDER — SODIUM CHLORIDE 9 MG/ML
20 INJECTION, SOLUTION INTRAVENOUS ONCE
Status: COMPLETED | OUTPATIENT
Start: 2021-02-09 | End: 2021-02-09

## 2021-02-09 RX ORDER — ACETAMINOPHEN 325 MG/1
650 TABLET ORAL ONCE
Status: COMPLETED | OUTPATIENT
Start: 2021-02-09 | End: 2021-02-09

## 2021-02-09 RX ORDER — ACETAMINOPHEN 325 MG/1
650 TABLET ORAL ONCE
Status: CANCELLED | OUTPATIENT
Start: 2021-03-09

## 2021-02-09 RX ADMIN — SODIUM CHLORIDE 20 ML/HR: 0.9 INJECTION, SOLUTION INTRAVENOUS at 08:24

## 2021-02-09 RX ADMIN — ACETAMINOPHEN 650 MG: 325 TABLET, FILM COATED ORAL at 08:25

## 2021-02-09 RX ADMIN — Medication 22.5 G: at 09:16

## 2021-02-09 RX ADMIN — HYDROCORTISONE SODIUM SUCCINATE 100 MG: 100 INJECTION, POWDER, FOR SOLUTION INTRAMUSCULAR; INTRAVENOUS at 08:50

## 2021-02-09 RX ADMIN — DIPHENHYDRAMINE HYDROCHLORIDE 12.5 MG: 50 INJECTION, SOLUTION INTRAMUSCULAR; INTRAVENOUS at 08:25

## 2021-02-09 NOTE — PROGRESS NOTES
Patient arrived on unit for IVIG  Denies any present issues/concerns  Tolerated IVIG without incident  Aware of next appointment   Declined AVS

## 2021-02-12 DIAGNOSIS — E09.9 STEROID-INDUCED DIABETES (HCC): ICD-10-CM

## 2021-02-12 DIAGNOSIS — T38.0X5A STEROID-INDUCED DIABETES (HCC): ICD-10-CM

## 2021-02-12 RX ORDER — INSULIN LISPRO 100 [IU]/ML
INJECTION, SOLUTION INTRAVENOUS; SUBCUTANEOUS
Qty: 5 PEN | Refills: 3 | Status: SHIPPED | OUTPATIENT
Start: 2021-02-12 | End: 2021-09-08 | Stop reason: SDUPTHER

## 2021-02-12 RX ORDER — PEN NEEDLE, DIABETIC 32GX 5/32"
NEEDLE, DISPOSABLE MISCELLANEOUS 3 TIMES DAILY
Qty: 300 EACH | Refills: 1 | Status: SHIPPED | OUTPATIENT
Start: 2021-02-12

## 2021-02-18 DIAGNOSIS — R13.19 ESOPHAGEAL DYSPHAGIA: ICD-10-CM

## 2021-02-18 DIAGNOSIS — K21.9 GASTROESOPHAGEAL REFLUX DISEASE WITHOUT ESOPHAGITIS: ICD-10-CM

## 2021-02-18 RX ORDER — PANTOPRAZOLE SODIUM 40 MG/1
40 TABLET, DELAYED RELEASE ORAL DAILY
Qty: 90 TABLET | Refills: 2 | Status: SHIPPED | OUTPATIENT
Start: 2021-02-18 | End: 2021-12-07

## 2021-02-19 DIAGNOSIS — Z00.00 PREVENTATIVE HEALTH CARE: ICD-10-CM

## 2021-02-19 RX ORDER — ACYCLOVIR 400 MG/1
TABLET ORAL
Qty: 60 TABLET | Refills: 11 | Status: SHIPPED | OUTPATIENT
Start: 2021-02-19 | End: 2022-06-27

## 2021-02-23 ENCOUNTER — HOSPITAL ENCOUNTER (OUTPATIENT)
Dept: INFUSION CENTER | Facility: CLINIC | Age: 66
Discharge: HOME | End: 2021-02-23
Payer: MEDICARE

## 2021-02-23 VITALS
SYSTOLIC BLOOD PRESSURE: 147 MMHG | DIASTOLIC BLOOD PRESSURE: 82 MMHG | RESPIRATION RATE: 20 BRPM | HEART RATE: 79 BPM | TEMPERATURE: 96.8 F

## 2021-02-23 DIAGNOSIS — C91.10 CLL (CHRONIC LYMPHOCYTIC LEUKEMIA) (HCC): Primary | ICD-10-CM

## 2021-02-23 DIAGNOSIS — D59.10 AUTOIMMUNE HEMOLYTIC ANEMIA (HCC): ICD-10-CM

## 2021-02-23 DIAGNOSIS — D80.1 HYPOGAMMAGLOBULINEMIA, ACQUIRED (HCC): ICD-10-CM

## 2021-02-23 LAB
ALBUMIN SERPL BCP-MCNC: 3.7 G/DL (ref 3.5–5.7)
ALP SERPL-CCNC: 60 U/L (ref 46–116)
ALT SERPL W P-5'-P-CCNC: 34 U/L (ref 12–78)
ANION GAP SERPL CALCULATED.3IONS-SCNC: 5 MMOL/L (ref 4–13)
AST SERPL W P-5'-P-CCNC: 33 U/L (ref 5–45)
BASOPHILS # BLD MANUAL: 0.09 THOUSAND/UL (ref 0–0.1)
BASOPHILS NFR MAR MANUAL: 2 % (ref 0–1)
BILIRUB SERPL-MCNC: 0.6 MG/DL (ref 0.2–1)
BUN SERPL-MCNC: 24 MG/DL (ref 5–25)
CALCIUM SERPL-MCNC: 9 MG/DL (ref 8.3–10.1)
CHLORIDE SERPL-SCNC: 107 MMOL/L (ref 98–108)
CO2 SERPL-SCNC: 28 MMOL/L (ref 21–32)
CREAT SERPL-MCNC: 1.1 MG/DL (ref 0.6–1.3)
EOSINOPHIL # BLD MANUAL: 0.18 THOUSAND/UL (ref 0–0.4)
EOSINOPHIL NFR BLD MANUAL: 4 % (ref 0–6)
ERYTHROCYTE [DISTWIDTH] IN BLOOD BY AUTOMATED COUNT: 13.9 % (ref 11.6–15.1)
GFR SERPL CREATININE-BSD FRML MDRD: 70 ML/MIN/1.73SQ M
GLUCOSE SERPL-MCNC: 172 MG/DL (ref 65–140)
HCT VFR BLD AUTO: 40.2 % (ref 36.5–49.3)
HGB BLD-MCNC: 13.4 G/DL (ref 12–17)
HGB RETIC QN AUTO: 35.4 PG (ref 30–38.3)
IGG SERPL-MCNC: 518 MG/DL (ref 700–1600)
IMM RETICS NFR: 11.7 % (ref 0–14)
LG PLATELETS BLD QL SMEAR: PRESENT
LYMPHOCYTES # BLD AUTO: 0.63 THOUSAND/UL (ref 0.6–4.47)
LYMPHOCYTES # BLD AUTO: 14 % (ref 14–44)
MCH RBC QN AUTO: 30.5 PG (ref 26.8–34.3)
MCHC RBC AUTO-ENTMCNC: 33.3 G/DL (ref 31.4–37.4)
MCV RBC AUTO: 91 FL (ref 82–98)
MONOCYTES # BLD AUTO: 0.4 THOUSAND/UL (ref 0–1.22)
MONOCYTES NFR BLD: 9 % (ref 4–12)
MYELOCYTES NFR BLD MANUAL: 1 % (ref 0–1)
NEUTROPHILS # BLD MANUAL: 3.14 THOUSAND/UL (ref 1.85–7.62)
NEUTS BAND NFR BLD MANUAL: 1 % (ref 0–8)
NEUTS SEG NFR BLD AUTO: 69 % (ref 43–75)
NRBC BLD AUTO-RTO: 1 /100 WBC (ref 0–2)
PLATELET # BLD AUTO: 158 THOUSANDS/UL (ref 149–390)
PLATELET BLD QL SMEAR: ADEQUATE
PMV BLD AUTO: 11.2 FL (ref 8.9–12.7)
POTASSIUM SERPL-SCNC: 4.1 MMOL/L (ref 3.5–5.3)
PROT SERPL-MCNC: 6 G/DL (ref 6.4–8.2)
RBC # BLD AUTO: 4.4 MILLION/UL (ref 3.88–5.62)
RBC MORPH BLD: NORMAL
RETICS # AUTO: ABNORMAL 10*3/UL (ref 14356–105094)
RETICS # CALC: 2.09 % (ref 0.37–1.87)
SODIUM SERPL-SCNC: 140 MMOL/L (ref 136–145)
TOTAL CELLS COUNTED SPEC: 100
WBC # BLD AUTO: 4.49 THOUSAND/UL (ref 4.31–10.16)

## 2021-02-23 PROCEDURE — 96415 CHEMO IV INFUSION ADDL HR: CPT

## 2021-02-23 PROCEDURE — 85046 RETICYTE/HGB CONCENTRATE: CPT | Performed by: INTERNAL MEDICINE

## 2021-02-23 PROCEDURE — 80053 COMPREHEN METABOLIC PANEL: CPT | Performed by: INTERNAL MEDICINE

## 2021-02-23 PROCEDURE — 82784 ASSAY IGA/IGD/IGG/IGM EACH: CPT | Performed by: INTERNAL MEDICINE

## 2021-02-23 PROCEDURE — 85007 BL SMEAR W/DIFF WBC COUNT: CPT | Performed by: INTERNAL MEDICINE

## 2021-02-23 PROCEDURE — 96367 TX/PROPH/DG ADDL SEQ IV INF: CPT

## 2021-02-23 PROCEDURE — 85027 COMPLETE CBC AUTOMATED: CPT | Performed by: INTERNAL MEDICINE

## 2021-02-23 PROCEDURE — 96413 CHEMO IV INFUSION 1 HR: CPT

## 2021-02-23 RX ORDER — SODIUM CHLORIDE 9 MG/ML
20 INJECTION, SOLUTION INTRAVENOUS ONCE
Status: COMPLETED | OUTPATIENT
Start: 2021-02-23 | End: 2021-02-23

## 2021-02-23 RX ORDER — ACETAMINOPHEN 325 MG/1
650 TABLET ORAL ONCE
Status: COMPLETED | OUTPATIENT
Start: 2021-02-23 | End: 2021-02-23

## 2021-02-23 RX ORDER — SODIUM CHLORIDE 9 MG/ML
20 INJECTION, SOLUTION INTRAVENOUS ONCE
Status: CANCELLED | OUTPATIENT
Start: 2021-02-23

## 2021-02-23 RX ORDER — ACETAMINOPHEN 325 MG/1
650 TABLET ORAL ONCE
Status: CANCELLED | OUTPATIENT
Start: 2021-02-23

## 2021-02-23 RX ADMIN — OBINUTUZUMAB 1000 MG: 1000 INJECTION, SOLUTION, CONCENTRATE INTRAVENOUS at 10:45

## 2021-02-23 RX ADMIN — DIPHENHYDRAMINE HYDROCHLORIDE 25 MG: 50 INJECTION, SOLUTION INTRAMUSCULAR; INTRAVENOUS at 09:22

## 2021-02-23 RX ADMIN — DEXAMETHASONE SODIUM PHOSPHATE 20 MG: 10 INJECTION, SOLUTION INTRAMUSCULAR; INTRAVENOUS at 09:02

## 2021-02-23 RX ADMIN — SODIUM CHLORIDE 20 ML/HR: 0.9 INJECTION, SOLUTION INTRAVENOUS at 08:48

## 2021-02-23 RX ADMIN — ACETAMINOPHEN 650 MG: 325 TABLET, FILM COATED ORAL at 09:03

## 2021-02-23 NOTE — PROGRESS NOTES
Pt arrived to unit without complaint  Pt tolerated Gazyva infusion without incident  AVS provided  Pt aware of future appts  Pt left unit in stable condition

## 2021-02-23 NOTE — PLAN OF CARE
Problem: Potential for Falls  Goal: Patient will remain free of falls  Description: INTERVENTIONS:  - Assess patient frequently for physical needs  -  Identify cognitive and physical deficits and behaviors that affect risk of falls    -  [place] fall precautions as indicated by assessment   - Educate patient/family on patient safety including physical limitations  - Instruct patient to call for assistance with activity based on assessment  - Modify environment to reduce risk of injury  - Consider OT/PT consult to assist with strengthening/mobility  Outcome: Progressing

## 2021-02-26 DIAGNOSIS — I10 ESSENTIAL HYPERTENSION: ICD-10-CM

## 2021-02-26 RX ORDER — AMLODIPINE BESYLATE 10 MG/1
10 TABLET ORAL DAILY
Qty: 90 TABLET | Refills: 3 | Status: SHIPPED | OUTPATIENT
Start: 2021-02-26 | End: 2022-03-28

## 2021-03-06 DIAGNOSIS — D80.1 HYPOGAMMAGLOBULINEMIA, ACQUIRED (HCC): Primary | ICD-10-CM

## 2021-03-06 DIAGNOSIS — F41.9 ANXIETY DISORDER, UNSPECIFIED TYPE: ICD-10-CM

## 2021-03-06 DIAGNOSIS — C91.10 CLL (CHRONIC LYMPHOCYTIC LEUKEMIA) (HCC): ICD-10-CM

## 2021-03-08 RX ORDER — LORAZEPAM 0.5 MG/1
0.5 TABLET ORAL DAILY PRN
Qty: 30 TABLET | Refills: 0 | Status: SHIPPED | OUTPATIENT
Start: 2021-03-08 | End: 2021-09-06 | Stop reason: SDUPTHER

## 2021-03-09 ENCOUNTER — HOSPITAL ENCOUNTER (OUTPATIENT)
Dept: INFUSION CENTER | Facility: CLINIC | Age: 66
Discharge: HOME | End: 2021-03-09
Payer: MEDICARE

## 2021-03-09 VITALS
TEMPERATURE: 97.6 F | RESPIRATION RATE: 18 BRPM | SYSTOLIC BLOOD PRESSURE: 151 MMHG | HEART RATE: 79 BPM | DIASTOLIC BLOOD PRESSURE: 78 MMHG

## 2021-03-09 DIAGNOSIS — C91.10 CLL (CHRONIC LYMPHOCYTIC LEUKEMIA) (HCC): Primary | ICD-10-CM

## 2021-03-09 DIAGNOSIS — D80.1 HYPOGAMMAGLOBULINEMIA, ACQUIRED (HCC): ICD-10-CM

## 2021-03-09 PROCEDURE — 96365 THER/PROPH/DIAG IV INF INIT: CPT

## 2021-03-09 PROCEDURE — 96375 TX/PRO/DX INJ NEW DRUG ADDON: CPT

## 2021-03-09 PROCEDURE — 96367 TX/PROPH/DG ADDL SEQ IV INF: CPT

## 2021-03-09 PROCEDURE — 96366 THER/PROPH/DIAG IV INF ADDON: CPT

## 2021-03-09 RX ORDER — ACETAMINOPHEN 325 MG/1
650 TABLET ORAL ONCE
Status: COMPLETED | OUTPATIENT
Start: 2021-03-09 | End: 2021-03-09

## 2021-03-09 RX ORDER — ACETAMINOPHEN 325 MG/1
650 TABLET ORAL ONCE
Status: CANCELLED | OUTPATIENT
Start: 2021-04-06

## 2021-03-09 RX ORDER — SODIUM CHLORIDE 9 MG/ML
20 INJECTION, SOLUTION INTRAVENOUS ONCE
Status: CANCELLED | OUTPATIENT
Start: 2021-04-06

## 2021-03-09 RX ORDER — SODIUM CHLORIDE 9 MG/ML
20 INJECTION, SOLUTION INTRAVENOUS ONCE
Status: COMPLETED | OUTPATIENT
Start: 2021-03-09 | End: 2021-03-09

## 2021-03-09 RX ADMIN — Medication 22.5 G: at 08:45

## 2021-03-09 RX ADMIN — DIPHENHYDRAMINE HYDROCHLORIDE 12.5 MG: 50 INJECTION, SOLUTION INTRAMUSCULAR; INTRAVENOUS at 08:18

## 2021-03-09 RX ADMIN — HYDROCORTISONE SODIUM SUCCINATE 100 MG: 100 INJECTION, POWDER, FOR SOLUTION INTRAMUSCULAR; INTRAVENOUS at 08:17

## 2021-03-09 RX ADMIN — ACETAMINOPHEN 650 MG: 325 TABLET, FILM COATED ORAL at 08:17

## 2021-03-09 RX ADMIN — SODIUM CHLORIDE 20 ML/HR: 0.9 INJECTION, SOLUTION INTRAVENOUS at 08:17

## 2021-03-09 NOTE — PROGRESS NOTES
Patient tolerated IVIG infusion well with no complications  Pt is aware of all future appointments   Declined AVS

## 2021-03-09 NOTE — PLAN OF CARE
Problem: Potential for Falls  Goal: Patient will remain free of falls  Description: INTERVENTIONS:  - Assess patient frequently for physical needs  -  Identify cognitive and physical deficits and behaviors that affect risk of falls    -  [place] fall precautions as indicated by assessment   - Educate patient/family on patient safety including physical limitations  - Instruct patient to call for assistance with activity based on assessment  - Modify environment to reduce risk of injury  - Consider OT/PT consult to assist with strengthening/mobility  Outcome: Progressing

## 2021-03-15 ENCOUNTER — OFFICE VISIT (OUTPATIENT)
Dept: FAMILY MEDICINE CLINIC | Facility: CLINIC | Age: 66
End: 2021-03-15
Payer: MEDICARE

## 2021-03-15 VITALS
SYSTOLIC BLOOD PRESSURE: 140 MMHG | DIASTOLIC BLOOD PRESSURE: 72 MMHG | TEMPERATURE: 97 F | BODY MASS INDEX: 31.38 KG/M2 | HEART RATE: 76 BPM | WEIGHT: 236.8 LBS | HEIGHT: 73 IN | OXYGEN SATURATION: 99 % | RESPIRATION RATE: 18 BRPM

## 2021-03-15 DIAGNOSIS — Z23 IMMUNIZATION DUE: Primary | ICD-10-CM

## 2021-03-15 DIAGNOSIS — F33.9 DEPRESSION, RECURRENT (HCC): ICD-10-CM

## 2021-03-15 DIAGNOSIS — J44.9 CHRONIC OBSTRUCTIVE PULMONARY DISEASE, UNSPECIFIED COPD TYPE (HCC): ICD-10-CM

## 2021-03-15 DIAGNOSIS — E11.9 TYPE 2 DIABETES MELLITUS WITHOUT COMPLICATION, WITH LONG-TERM CURRENT USE OF INSULIN (HCC): ICD-10-CM

## 2021-03-15 DIAGNOSIS — Z79.4 TYPE 2 DIABETES MELLITUS WITHOUT COMPLICATION, WITH LONG-TERM CURRENT USE OF INSULIN (HCC): ICD-10-CM

## 2021-03-15 DIAGNOSIS — Z00.00 MEDICARE ANNUAL WELLNESS VISIT, SUBSEQUENT: ICD-10-CM

## 2021-03-15 DIAGNOSIS — R06.00 DYSPNEA ON EXERTION: ICD-10-CM

## 2021-03-15 PROCEDURE — 99213 OFFICE O/P EST LOW 20 MIN: CPT | Performed by: INTERNAL MEDICINE

## 2021-03-15 PROCEDURE — G0009 ADMIN PNEUMOCOCCAL VACCINE: HCPCS

## 2021-03-15 PROCEDURE — G0438 PPPS, INITIAL VISIT: HCPCS | Performed by: INTERNAL MEDICINE

## 2021-03-15 PROCEDURE — 1123F ACP DISCUSS/DSCN MKR DOCD: CPT

## 2021-03-15 PROCEDURE — 90732 PPSV23 VACC 2 YRS+ SUBQ/IM: CPT

## 2021-03-15 NOTE — PROGRESS NOTES
Assessment/Plan:         Diagnoses and all orders for this visit:    Immunization due  -     PNEUMOCOCCAL POLYSACCHARIDE VACCINE 23-VALENT =>3YO SQ IM    Dyspnea on exertion  -     Complete PFT with post bronchodilator; Future    Chronic obstructive pulmonary disease, unspecified COPD type ([facility] )  See discussion above  Depression, recurrent (HCC)  Lorazepam not using Celexa  Is also on gabapentin  Type 2 diabetes mellitus without complication, with long-term current use of insulin ([facility])  Hemoglobin A1c 8 1 endocrinology fall      Medicare annual wellness visit, subsequent    2nd pneumonia vaccine given today patient wants to hold off on COVID vaccination  Subjective:      Patient ID: Becky Catalan is a 76 y o  male  HPI  Patient history of CLL, hypertension, colitis steroid-induced diabetes mellitus remote history of smoking is here to follow-up on chronic medical problems  He is doing quite well overall  Blood pressure is good  Recent lab studies were reviewed and discussed with patient  He reports no new issues  Is noted to need 2nd pneumonia vaccine  I also encouraged him to get PFTs  CT scan urine half ago did not show any concerning pulmonary abnormality  He does get out of breath with exertion  Currently not using an inhaler  Patient agrees to getting PFTs  Echocardiogram few years ago was unremarkable  The following portions of the patient's history were reviewed and updated as appropriate: allergies, current medications, past family history, past medical history, past social history, past surgical history and problem list     Review of Systems   Constitutional: Negative for chills and fever  Respiratory:        As above   Cardiovascular: Negative for chest pain, palpitations and leg swelling  Gastrointestinal: Negative for abdominal pain, blood in stool, constipation and diarrhea     Hematological:        As above         Objective:      /72 (BP Location: Left arm, Patient Position: Sitting, Cuff Size: Adult)   Pulse 76   Temp (!) 97 °F (36 1 °C)   Resp 18   Ht 6' 1" (1 854 m)   Wt 107 kg (236 lb 12 8 oz)   SpO2 99%   BMI 31 24 kg/m²          Physical Exam  Cardiovascular:      Rate and Rhythm: Normal rate and regular rhythm  Heart sounds: Normal heart sounds  No murmur  Pulmonary:      Effort: Pulmonary effort is normal  No respiratory distress  Breath sounds: Normal breath sounds  No wheezing or rales  Abdominal:      General: Bowel sounds are normal  There is no distension  Palpations: There is no mass  Tenderness: There is no abdominal tenderness  There is no guarding  Musculoskeletal:      Right lower leg: No edema  Left lower leg: No edema  Skin:     Comments: Fainting ecchymosis left mid abdomen where he hit the side of a table   Neurological:      Mental Status: He is alert     Psychiatric:         Mood and Affect: Mood normal          Behavior: Behavior normal

## 2021-03-15 NOTE — PROGRESS NOTES
Assessment and Plan:     Problem List Items Addressed This Visit     None           Preventive health issues were discussed with patient, and age appropriate screening tests were ordered as noted in patient's After Visit Summary  Personalized health advice and appropriate referrals for health education or preventive services given if needed, as noted in patient's After Visit Summary       History of Present Illness:     Patient presents for Medicare Annual Wellness visit    Patient Care Team:  Urvashi Beck MD as PCP - General (Internal Medicine)  Mg Ozuna MD as PCP - Endocrinology (Endocrinology)  MD David Perez Mertha Green, MD Anastacia Burner, MD Darel Catholic, MD Giselle Alvarenga MD (Cardiology)  Andra Farmer MD (Orthopedic Surgery)  Mg Ozuna MD (Endocrinology)     Problem List:     Patient Active Problem List   Diagnosis    CAD (coronary artery disease)    CLL (chronic lymphocytic leukemia) ([facility] )    Hyperlipidemia    Hypertension    History of TIA (transient ischemic attack)    Esophagitis, reflux    Candida esophagitis ([facility] )    Chronic kidney disease    H/O blood clots    Hemolytic anemia (HCC)    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[facility])    Anemia, chronic disease    Chronic lymphocytic leukemia ([address] )    Leukopenia due to antineoplastic chemotherapy ([facility] )    Hyperglycemia    Cellulitis of head or scalp    Pancytopenia ([address] )    Headache around the eyes    Gastroesophageal reflux disease without esophagitis    Colitis, acute    Listeria bacteremia    Thrombocytopenia ([address] )    Diabetes mellitus ([address] )    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[facility] )    Chronic right shoulder pain    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[facility] )    Ulcer of esophagus without bleeding    Esophageal stricture    Dysphagia    Esophageal dysphagia    Acute bursitis of right shoulder    Hypogammaglobulinemia, acquired ([address] )    Autoimmune hemolytic anemia    Right upper quadrant abdominal pain    Age related osteoporosis    Chronic obstructive pulmonary disease (HCC)      Past Medical and Surgical History:     Past Medical History:   Diagnosis Date    Anemia     Arthritis     CAD (coronary artery disease) 2004    Cellulitis of scalp     CKD (chronic kidney disease) stage 3, GFR 30-59 ml/min     CLL (chronic lymphocytic leukemia) ([facility] )     COPD [facility]chronic obstructive [facility] disease) [facility] )     Diabetes mellitus ([facility] )     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    Dyslipidemia     Edema extremities     Esophageal ulcer     H/O blood clots     Hemolytic anemia (HCC)     Hiatal hernia     History of TIA (transient ischemic attack)     Hyperlipidemia     Hypertension     Listeria infection 2018    Multiple gastric ulcers     Myocardial infarction ([facility] ) 2004    acute    Neutropenia (HCC)     Obese     Recurrent sinusitis     Sleep apnea     Thrombocytopenia (HCC)     Vertigo      Past Surgical History:   Procedure Laterality Date    ARTHROSCOPIC REPAIR ACL      lt knee    CARDIAC SURGERY      cardiac cath with stent    FOOT SURGERY      IR PORT CHECK  5/17/2019    KNEE ARTHROSCOPY      OTHER SURGICAL HISTORY      cardiac cath lesion 1, 1st adjunct treat device: stent    PORTACATH PLACEMENT      GA ESOPHAGOGASTRODUODENOSCOPY TRANSORAL DIAGNOSTIC N/A 10/5/2017    Procedure: ESOPHAGOGASTRODUODENOSCOPY (EGD) with bx;  Surgeon: Zaynab Thao DO;  Location: [facility]; Service: Gastroenterology    GA ESOPHAGOGASTRODUODENOSCOPY TRANSORAL DIAGNOSTIC N/A 3/8/2018    Procedure: ESOPHAGOGASTRODUODENOSCOPY (EGD) with biopsy;  Surgeon: Zaynab Thao DO;  Location: [facility]; Service: Gastroenterology    GA ESOPHAGOGASTRODUODENOSCOPY TRANSORAL DIAGNOSTIC N/A 6/20/2018    Procedure: ESOPHAGOGASTRODUODENOSCOPY (EGD) with Dilation;  Surgeon: Zaynab Thao DO;  Location: [facility];   Service: Gastroenterology    GA ESOPHAGOGASTRODUODENOSCOPY TRANSORAL DIAGNOSTIC N/A 10/18/2018 Procedure: ESOPHAGOGASTRODUODENOSCOPY (EGD) with dilation;  Surgeon: Jazlyn Nolasco MD;  Location: [facility]; Service: Gastroenterology    [place] ESOPHAGOSCOPY FLEXIBLE TRANSORAL WITH BIOPSY N/A 2016    Procedure: ESOPHAGOGASTRODUODENOSCOPY (EGD); ESOPHAGEAL DILATION; ESOPHAGEAL BIOPSY;  Surgeon: Evy Beth MD;  [facility];  Service: Thoracic    TONSILLECTOMY      UPPER GASTROINTESTINAL ENDOSCOPY      x 6      Family History:     Family History   Problem Relation Age of Onset    Leukemia Mother         chronic    Colon polyps Mother         sidmoid    Parkinsonism Father       Social History:        Social History     Socioeconomic History    Marital status: /Civil Union     Spouse name: None    Number of children: None    Years of education: None    Highest education level: None   Occupational History    Occupation: [place] [employer]   Social Needs    Financial resource strain: None    Food insecurity     Worry: None     Inability: None    Transportation needs     Medical: None     Non-medical: None   Tobacco Use    Smoking status: Former Smoker     Packs/day: 1 00     Years: 10 00     Pack years: 10 00     Types: Cigarettes     Quit date: 2015     Years since quittin 0    Smokeless tobacco: Never Used   Substance and Sexual Activity    Alcohol use:  Yes     Alcohol/week: 2 0 standard drinks     Types: 2 Cans of beer per week     Comment: social use as per [employer]    Drug use: No    Sexual activity: None   Lifestyle    Physical activity     Days per week: None     Minutes per session: None    Stress: None   Relationships    Social connections     Talks on phone: None     Gets together: None     Attends Mandaen service: None     Active member of club or organization: None     Attends meetings of clubs or organizations: None     Relationship status: None    Intimate partner violence     Fear of current or ex partner: None     Emotionally abused: None Physically abused: None     Forced sexual activity: None   Other Topics Concern    None   Social History Narrative    None      Medications and Allergies:     Current Outpatient Medications   Medication Sig Dispense Refill    acyclovir (ZOVIRAX) 400 MG tablet TAKE 1 TABLET TWICE A DAY 60 tablet 11    amLODIPine (NORVASC) 10 mg tablet Take 1 tablet (10 mg total) by mouth daily 90 tablet 3    aspirin 81 MG tablet Take 81 mg by mouth every other day Every other day       fenofibrate (TRICOR) 145 mg tablet TAKE 1 TABLET DAILY 90 tablet 5    folic acid (FOLVITE) 1 mg tablet Take 800 mcg by mouth daily       furosemide (LASIX) 40 mg tablet TAKE 1 TABLET DAILY 90 tablet 5    gabapentin (NEURONTIN) 600 MG tablet Take 1 tablet (600 mg total) by mouth daily 30 tablet 0    glucose monitoring kit (FREESTYLE) monitoring kit 1 each by Does not apply route as needed ( ) E 11 9 1 each 0    Ibrutinib 140 MG TABS Take 140 mg by mouth daily 30 tablet 11    insulin lispro (HumaLOG) 100 units/mL injection pen Use 5-10 units before meals as needed for steroid induced hyperglycemia 5 pen 3    Insulin Pen Needle (BD Pen Needle Inez U/F) 32G X 4 MM MISC Use 3 (three) times a day 300 each 1    Insulin Syringe-Needle U-100 30G X 1/2" 0 3 ML MISC by Does not apply route 2 (two) times a day 100 each 6    Lancets (FREESTYLE) lancets Use as instructed--test 2 times a day    E 11 9 100 each 0    LORazepam (ATIVAN) 0 5 mg tablet Take 1 tablet (0 5 mg total) by mouth daily as needed for anxiety 30 tablet 0    metFORMIN (GLUCOPHAGE-XR) 500 mg 24 hr tablet Take 2 tablets (1,000 mg total) by mouth daily with dinner 180 tablet 1    multivitamin (THERAGRAN) TABS Take 1 tablet by mouth daily      obinutuzumab (GAZYVA) 1000 mg/40 mL SOLN Infuse into a venous catheter      pantoprazole (PROTONIX) 40 mg tablet Take 1 tablet (40 mg total) by mouth daily 90 tablet 2    Potassium (POTASSIMIN PO) Take 20 mEq by mouth daily      predniSONE 5 mg tablet Take 1 tablet (5 mg total) by mouth daily 90 tablet 2    sodium chloride, PF, 0 9 % 10 mL by Intracatheter route see administration instructions 10mL into port before and after ampicillin doses  0    Ventolin  (90 Base) MCG/ACT inhaler USE 2 INHALATIONS FOUR TIMES A DAY AS NEEDED FOR WHEEZING 36 g 5    zolpidem (AMBIEN) 5 mg tablet Take 1 tablet (5 mg total) by mouth daily at bedtime as needed for sleep (Patient taking differently: Take 5 mg by mouth daily at bedtime as needed for sleep ) 30 tablet 0    citalopram (CeleXA) 40 mg tablet Take 1 tablet (40 mg total) by mouth daily for 28 days (Patient not taking: Reported on 3/15/2021) 14 tablet 1    losartan (COZAAR) 100 MG tablet Take 1 tablet (100 mg total) by mouth daily (Patient not taking: Reported on 3/15/2021) 90 tablet 1    naloxone (NARCAN) 4 mg/0 1 mL nasal spray Administer 1 spray into a nostril  If breathing does not return to normal or if breathing difficulty resumes after 2-3 minutes, give another dose in the other nostril using a new spray  (Patient not taking: Reported on 1/20/2021) 1 each 1    oxyCODONE (ROXICODONE) 10 MG TABS Take 1 tablet (10 mg total) by mouth every 6 (six) hours as needed for moderate pain For ongoing pain controlMax Daily Amount: 40 mg (Patient not taking: Reported on 3/15/2021) 90 tablet 0     No current facility-administered medications for this visit        Allergies   Allergen Reactions    Heparin Other (See Comments)     Other reaction(s): Blood Disorders  clot    Macrolides And Ketolides Other (See Comments)     Interacts with other meds he is taking      Immunizations:     Immunization History   Administered Date(s) Administered    INFLUENZA 12/05/2006, 11/02/2007, 10/29/2010, 03/20/2019    Pneumococcal Conjugate 13-Valent 12/09/2019      Health Maintenance:         Topic Date Due    Hepatitis C Screening  1955    Colorectal Cancer Screening  01/14/2026         Topic Date Due    COVID-19 Vaccine (1 of 2) 08/18/1970    DTaP,Tdap,and Td Vaccines (1 - Tdap) 08/18/1975    Influenza Vaccine (1) 09/01/2020    Pneumococcal Vaccine: 65+ Years (2 of 2 - PPSV23) 12/09/2020      Medicare Health Risk Assessment:     /72 (BP Location: Left arm, Patient Position: Sitting, Cuff Size: Adult)   Pulse 76   Temp (!) 97 °F (36 1 °C)   Resp 18   Ht 6' 1" (1 854 m)   Wt 107 kg (236 lb 12 8 oz)   SpO2 99%   BMI 31 24 kg/m²      Sharath Dc is here for his Subsequent Wellness visit  Health Risk Assessment:   Patient rates overall health as poor  Patient feels that their physical health rating is same  Patient is very satisfied with their life  Eyesight was rated as same  Hearing was rated as slightly worse  Patient feels that their emotional and mental health rating is same  Patients states they are never, rarely angry  Patient states they are always unusually tired/fatigued  Pain experienced in the last 7 days has been some  Patient's pain rating has been 5/10  Patient states that he has experienced no weight loss or gain in last 6 months  Depression Screening:   PHQ-2 Score: 0  PHQ-9 Score: 10      Fall Risk Screening: In the past year, patient has experienced: no history of falling in past year      Home Safety:  Patient does not have trouble with stairs inside or outside of their home  Patient has working smoke alarms and has working carbon monoxide detector  Home safety hazards include: none  Nutrition:   Current diet is Diabetic  Medications:   Patient is currently taking over-the-counter supplements  OTC medications include: see medication list  Patient is able to manage medications  Activities of Daily Living (ADLs)/Instrumental Activities of Daily Living (IADLs):   Walk and transfer into and out of bed and chair?: Yes  Dress and groom yourself?: Yes    Bathe or shower yourself?: Yes    Feed yourself?  Yes  Do your laundry/housekeeping?: Yes  Manage your money, pay your bills and track your expenses?: Yes  Make your own meals?: Yes    Do your own shopping?: Yes    Previous Hospitalizations:   Any hospitalizations or ED visits within the last 12 months?: No      Advance Care Planning:   Living will: Yes    Durable POA for healthcare:  Yes    Advanced directive: Yes      PREVENTIVE SCREENINGS      Cardiovascular Screening:    General: Screening Not Indicated and History Lipid Disorder      Diabetes Screening:     General: Screening Not Indicated and History Diabetes      Colorectal Cancer Screening:     General: Screening Current      Osteoporosis Screening:    General: Screening Not Indicated and History Osteoporosis      Abdominal Aortic Aneurysm (AAA) Screening:    Risk factors include: age between 73-69 yo and tobacco use        Lung Cancer Screening:     General: Screening Not Indicated    Screening, Brief Intervention, and Referral to Treatment (SBIRT)    Screening    Typical number of drinks in a week: 2    Single Item Drug Screening:  How often have you used an illegal drug (including marijuana) or a prescription medication for non-medical reasons in the past year? never    Single Item Drug Screen Score: 0  Interpretation: Negative screen for possible drug use disorder      Navneet Lee MD

## 2021-03-21 RX ORDER — ACETAMINOPHEN 325 MG/1
650 TABLET ORAL ONCE
Status: CANCELLED | OUTPATIENT
Start: 2021-03-23

## 2021-03-21 RX ORDER — SODIUM CHLORIDE 9 MG/ML
20 INJECTION, SOLUTION INTRAVENOUS ONCE
Status: CANCELLED | OUTPATIENT
Start: 2021-03-23

## 2021-03-23 ENCOUNTER — HOSPITAL ENCOUNTER (OUTPATIENT)
Dept: INFUSION CENTER | Facility: CLINIC | Age: 66
Discharge: HOME | End: 2021-03-23
Payer: MEDICARE

## 2021-03-23 VITALS
HEART RATE: 78 BPM | RESPIRATION RATE: 18 BRPM | TEMPERATURE: 98.2 F | DIASTOLIC BLOOD PRESSURE: 83 MMHG | SYSTOLIC BLOOD PRESSURE: 158 MMHG

## 2021-03-23 DIAGNOSIS — D59.10 AUTOIMMUNE HEMOLYTIC ANEMIA (HCC): ICD-10-CM

## 2021-03-23 DIAGNOSIS — D80.1 HYPOGAMMAGLOBULINEMIA, ACQUIRED (HCC): ICD-10-CM

## 2021-03-23 DIAGNOSIS — C91.10 CLL (CHRONIC LYMPHOCYTIC LEUKEMIA) (HCC): Primary | ICD-10-CM

## 2021-03-23 LAB
ALBUMIN SERPL BCP-MCNC: 3.4 G/DL (ref 3.5–5.7)
ALP SERPL-CCNC: 61 U/L (ref 46–116)
ALT SERPL W P-5'-P-CCNC: 39 U/L (ref 12–78)
ANION GAP SERPL CALCULATED.3IONS-SCNC: 7 MMOL/L (ref 4–13)
AST SERPL W P-5'-P-CCNC: 31 U/L (ref 5–45)
BASOPHILS # BLD MANUAL: 0.04 THOUSAND/UL (ref 0–0.1)
BASOPHILS NFR MAR MANUAL: 1 % (ref 0–1)
BILIRUB SERPL-MCNC: 0.5 MG/DL (ref 0.2–1)
BUN SERPL-MCNC: 16 MG/DL (ref 5–25)
CALCIUM ALBUM COR SERPL-MCNC: 10 MG/DL (ref 8.3–10.1)
CALCIUM SERPL-MCNC: 9.5 MG/DL (ref 8.3–10.1)
CHLORIDE SERPL-SCNC: 105 MMOL/L (ref 98–108)
CO2 SERPL-SCNC: 28 MMOL/L (ref 21–32)
CREAT SERPL-MCNC: 1.4 MG/DL (ref 0.6–1.3)
EOSINOPHIL # BLD MANUAL: 0.12 THOUSAND/UL (ref 0–0.4)
EOSINOPHIL NFR BLD MANUAL: 3 % (ref 0–6)
ERYTHROCYTE [DISTWIDTH] IN BLOOD BY AUTOMATED COUNT: 13.9 % (ref 11.6–15.1)
GFR SERPL CREATININE-BSD FRML MDRD: 52 ML/MIN/1.73SQ M
GLUCOSE SERPL-MCNC: 246 MG/DL (ref 65–140)
HCT VFR BLD AUTO: 38.7 % (ref 36.5–49.3)
HGB BLD-MCNC: 13 G/DL (ref 12–17)
HGB RETIC QN AUTO: 35 PG (ref 30–38.3)
IGG SERPL-MCNC: 511 MG/DL (ref 700–1600)
IMM RETICS NFR: 18.7 % (ref 0–14)
LYMPHOCYTES # BLD AUTO: 0.52 THOUSAND/UL (ref 0.6–4.47)
LYMPHOCYTES # BLD AUTO: 13 % (ref 14–44)
MCH RBC QN AUTO: 30.6 PG (ref 26.8–34.3)
MCHC RBC AUTO-ENTMCNC: 33.6 G/DL (ref 31.4–37.4)
MCV RBC AUTO: 91 FL (ref 82–98)
MONOCYTES # BLD AUTO: 0.52 THOUSAND/UL (ref 0–1.22)
MONOCYTES NFR BLD: 13 % (ref 4–12)
NEUTROPHILS # BLD MANUAL: 2.8 THOUSAND/UL (ref 1.85–7.62)
NEUTS BAND NFR BLD MANUAL: 1 % (ref 0–8)
NEUTS SEG NFR BLD AUTO: 69 % (ref 43–75)
PLATELET # BLD AUTO: 138 THOUSANDS/UL (ref 149–390)
PLATELET BLD QL SMEAR: ABNORMAL
PMV BLD AUTO: 11.3 FL (ref 8.9–12.7)
POTASSIUM SERPL-SCNC: 3.9 MMOL/L (ref 3.5–5.3)
PROT SERPL-MCNC: 6 G/DL (ref 6.4–8.2)
RBC # BLD AUTO: 4.25 MILLION/UL (ref 3.88–5.62)
RBC MORPH BLD: NORMAL
RETICS # AUTO: ABNORMAL 10*3/UL (ref 14356–105094)
RETICS # CALC: 2.91 % (ref 0.37–1.87)
SODIUM SERPL-SCNC: 140 MMOL/L (ref 136–145)
TOTAL CELLS COUNTED SPEC: 100
WBC # BLD AUTO: 4 THOUSAND/UL (ref 4.31–10.16)

## 2021-03-23 PROCEDURE — 85007 BL SMEAR W/DIFF WBC COUNT: CPT | Performed by: INTERNAL MEDICINE

## 2021-03-23 PROCEDURE — 85046 RETICYTE/HGB CONCENTRATE: CPT | Performed by: INTERNAL MEDICINE

## 2021-03-23 PROCEDURE — 96415 CHEMO IV INFUSION ADDL HR: CPT

## 2021-03-23 PROCEDURE — 96413 CHEMO IV INFUSION 1 HR: CPT

## 2021-03-23 PROCEDURE — 82784 ASSAY IGA/IGD/IGG/IGM EACH: CPT | Performed by: INTERNAL MEDICINE

## 2021-03-23 PROCEDURE — 96367 TX/PROPH/DG ADDL SEQ IV INF: CPT

## 2021-03-23 PROCEDURE — 80053 COMPREHEN METABOLIC PANEL: CPT | Performed by: INTERNAL MEDICINE

## 2021-03-23 PROCEDURE — 85027 COMPLETE CBC AUTOMATED: CPT | Performed by: INTERNAL MEDICINE

## 2021-03-23 RX ORDER — ACETAMINOPHEN 325 MG/1
650 TABLET ORAL ONCE
Status: COMPLETED | OUTPATIENT
Start: 2021-03-23 | End: 2021-03-23

## 2021-03-23 RX ORDER — SODIUM CHLORIDE 9 MG/ML
20 INJECTION, SOLUTION INTRAVENOUS ONCE
Status: COMPLETED | OUTPATIENT
Start: 2021-03-23 | End: 2021-03-23

## 2021-03-23 RX ADMIN — SODIUM CHLORIDE 20 ML/HR: 0.9 INJECTION, SOLUTION INTRAVENOUS at 08:41

## 2021-03-23 RX ADMIN — DIPHENHYDRAMINE HYDROCHLORIDE 25 MG: 50 INJECTION, SOLUTION INTRAMUSCULAR; INTRAVENOUS at 09:01

## 2021-03-23 RX ADMIN — OBINUTUZUMAB 1000 MG: 1000 INJECTION, SOLUTION, CONCENTRATE INTRAVENOUS at 09:56

## 2021-03-23 RX ADMIN — DEXAMETHASONE SODIUM PHOSPHATE 20 MG: 10 INJECTION, SOLUTION INTRAMUSCULAR; INTRAVENOUS at 08:41

## 2021-03-23 RX ADMIN — ACETAMINOPHEN 650 MG: 325 TABLET, FILM COATED ORAL at 08:41

## 2021-03-23 NOTE — PLAN OF CARE
Problem: Potential for Falls  Goal: Patient will remain free of falls  Description: INTERVENTIONS:  - Assess patient frequently for physical needs  -  Identify cognitive and physical deficits and behaviors that affect risk of falls    -  [place] fall precautions as indicated by assessment   - Educate patient/family on patient safety including physical limitations  - Instruct patient to call for assistance with activity based on assessment  - Modify environment to reduce risk of injury  - Consider OT/PT consult to assist with strengthening/mobility  Outcome: Progressing

## 2021-03-24 DIAGNOSIS — I10 ESSENTIAL HYPERTENSION: ICD-10-CM

## 2021-03-24 RX ORDER — LOSARTAN POTASSIUM 100 MG/1
TABLET ORAL
Qty: 90 TABLET | Refills: 5 | Status: SHIPPED | OUTPATIENT
Start: 2021-03-24 | End: 2022-05-25

## 2021-03-29 ENCOUNTER — OFFICE VISIT (OUTPATIENT)
Dept: CARDIOLOGY CLINIC | Facility: CLINIC | Age: 66
End: 2021-03-29
Payer: MEDICARE

## 2021-03-29 VITALS
OXYGEN SATURATION: 97 % | DIASTOLIC BLOOD PRESSURE: 64 MMHG | HEART RATE: 88 BPM | BODY MASS INDEX: 31.14 KG/M2 | SYSTOLIC BLOOD PRESSURE: 120 MMHG | HEIGHT: 73 IN | WEIGHT: 235 LBS

## 2021-03-29 DIAGNOSIS — I10 ESSENTIAL HYPERTENSION: ICD-10-CM

## 2021-03-29 DIAGNOSIS — I25.10 CORONARY ARTERY DISEASE INVOLVING NATIVE CORONARY ARTERY OF NATIVE HEART WITHOUT ANGINA PECTORIS: Primary | ICD-10-CM

## 2021-03-29 DIAGNOSIS — E78.2 MIXED HYPERLIPIDEMIA: ICD-10-CM

## 2021-03-29 PROCEDURE — 99214 OFFICE O/P EST MOD 30 MIN: CPT | Performed by: INTERNAL MEDICINE

## 2021-03-29 NOTE — LETTER
March 29, 2021     Leopoldo Hales, MD  [address]  [place] [address]    Patient: Zenia Vizcarra   YOB: 1955   Date of Visit: 3/29/2021       Dear Dr Nini Holloway: Thank you for referring Audrey Akbar to me for evaluation  Below are my notes for this consultation  If you have questions, please do not hesitate to call me  I look forward to following your patient along with you  Sincerely,        Antonio Mendes MD        CC: No Recipients  Antonio Mendes MD  3/29/2021  2:28 PM  Sign when Signing Visit  Assessment/Plan:    Hypertension    Hypertension, stable and adequately controlled  CAD (coronary artery disease)    Coronary artery disease, stable  No symptoms of angina  No signs of heart failure  Hyperlipidemia    Hyperlipidemia, stable  The patient will continue fenofibrate  Diagnoses and all orders for this visit:    Coronary artery disease involving native coronary artery of native heart without angina pectoris    Essential hypertension    Mixed hyperlipidemia          Subjective:   Some dyspnea on exertion with mild palpitation  Patient ID: Zenia Vizcarra is a 76 y o  male  The patient presented to this office for the purpose of cardiac follow-up  He is known to have history of coronary artery disease with chronic obstructive lung disease as well as hypertension, hyperlipidemia anxiety disorder  He has also been treated for CLL  The patient has some dyspnea on exertion with minor symptoms of palpitation  He denies symptoms chest pain  Has no leg edema  He has experienced no symptoms of syncope or near-syncope  The following portions of the patient's history were reviewed and updated as appropriate: allergies, current medications, past family history, past medical history, past social history, past surgical history and problem list     Review of Systems   Respiratory: Positive for shortness of breath  Negative for apnea, cough, chest tightness and wheezing  Cardiovascular: Positive for palpitations  Negative for chest pain and leg swelling  Gastrointestinal: Negative for abdominal pain  Neurological: Negative for dizziness and light-headedness  Psychiatric/Behavioral: Negative  Objective:  Stable cardiac-wise  /64 (BP Location: Left arm, Patient Position: Sitting, Cuff Size: Large)   Pulse 88   Ht 6' 1" (1 854 m)   Wt 107 kg (235 lb)   SpO2 97%   BMI 31 00 kg/m²          Physical Exam  Vitals signs reviewed  Constitutional:       General: He is not in acute distress  Appearance: He is well-developed  He is not diaphoretic  HENT:      Head: Normocephalic  Eyes:      Pupils: Pupils are equal, round, and reactive to light  Neck:      Musculoskeletal: Normal range of motion  Thyroid: No thyromegaly  Vascular: No JVD  Cardiovascular:      Rate and Rhythm: Normal rate and regular rhythm  Heart sounds: S1 normal and S2 normal  No murmur  No friction rub  No gallop  Pulmonary:      Effort: Pulmonary effort is normal  No respiratory distress  Breath sounds: Normal breath sounds  No wheezing or rales  Chest:      Chest wall: No tenderness  Abdominal:      Palpations: Abdomen is soft  Musculoskeletal: Normal range of motion  General: No tenderness or deformity  Right lower leg: No edema  Left lower leg: No edema  Skin:     General: Skin is warm and dry  Neurological:      Mental Status: He is alert and oriented to person, place, and time     Psychiatric:         Mood and Affect: Mood normal          Behavior: Behavior normal

## 2021-03-29 NOTE — PROGRESS NOTES
Assessment/Plan:    Hypertension    Hypertension, stable and adequately controlled  CAD (coronary artery disease)    Coronary artery disease, stable  No symptoms of angina  No signs of heart failure  Hyperlipidemia    Hyperlipidemia, stable  The patient will continue fenofibrate  Diagnoses and all orders for this visit:    Coronary artery disease involving native coronary artery of native heart without angina pectoris    Essential hypertension    Mixed hyperlipidemia          Subjective:   Some dyspnea on exertion with mild palpitation  Patient ID: Chase Phalen is a 76 y o  male  The patient presented to this office for the purpose of cardiac follow-up  He is known to have history of coronary artery disease with chronic obstructive lung disease as well as hypertension, hyperlipidemia anxiety disorder  He has also been treated for CLL  The patient has some dyspnea on exertion with minor symptoms of palpitation  He denies symptoms chest pain  Has no leg edema  He has experienced no symptoms of syncope or near-syncope  The following portions of the patient's history were reviewed and updated as appropriate: allergies, current medications, past family history, past medical history, past social history, past surgical history and problem list     Review of Systems   Respiratory: Positive for shortness of breath  Negative for apnea, cough, chest tightness and wheezing  Cardiovascular: Positive for palpitations  Negative for chest pain and leg swelling  Gastrointestinal: Negative for abdominal pain  Neurological: Negative for dizziness and light-headedness  Psychiatric/Behavioral: Negative  Objective:  Stable cardiac-wise  /64 (BP Location: Left arm, Patient Position: Sitting, Cuff Size: Large)   Pulse 88   Ht 6' 1" (1 854 m)   Wt 107 kg (235 lb)   SpO2 97%   BMI 31 00 kg/m²          Physical Exam  Vitals signs reviewed     Constitutional:       General: He is not in acute distress  Appearance: He is well-developed  He is not diaphoretic  HENT:      Head: Normocephalic  Eyes:      Pupils: Pupils are equal, round, and reactive to light  Neck:      Musculoskeletal: Normal range of motion  Thyroid: No thyromegaly  Vascular: No JVD  Cardiovascular:      Rate and Rhythm: Normal rate and regular rhythm  Heart sounds: S1 normal and S2 normal  No murmur  No friction rub  No gallop  Pulmonary:      Effort: Pulmonary effort is normal  No respiratory distress  Breath sounds: Normal breath sounds  No wheezing or rales  Chest:      Chest wall: No tenderness  Abdominal:      Palpations: Abdomen is soft  Musculoskeletal: Normal range of motion  General: No tenderness or deformity  Right lower leg: No edema  Left lower leg: No edema  Skin:     General: Skin is warm and dry  Neurological:      Mental Status: He is alert and oriented to person, place, and time     Psychiatric:         Mood and Affect: Mood normal          Behavior: Behavior normal

## 2021-04-06 ENCOUNTER — HOSPITAL ENCOUNTER (OUTPATIENT)
Dept: INFUSION CENTER | Facility: CLINIC | Age: 66
Discharge: HOME | End: 2021-04-06
Payer: MEDICARE

## 2021-04-06 VITALS
BODY MASS INDEX: 31.41 KG/M2 | WEIGHT: 238.1 LBS | TEMPERATURE: 97.6 F | DIASTOLIC BLOOD PRESSURE: 87 MMHG | HEART RATE: 73 BPM | SYSTOLIC BLOOD PRESSURE: 153 MMHG | RESPIRATION RATE: 20 BRPM

## 2021-04-06 DIAGNOSIS — D80.1 HYPOGAMMAGLOBULINEMIA, ACQUIRED (HCC): ICD-10-CM

## 2021-04-06 DIAGNOSIS — C91.10 CLL (CHRONIC LYMPHOCYTIC LEUKEMIA) (HCC): Primary | ICD-10-CM

## 2021-04-06 PROCEDURE — 96367 TX/PROPH/DG ADDL SEQ IV INF: CPT

## 2021-04-06 PROCEDURE — 96365 THER/PROPH/DIAG IV INF INIT: CPT

## 2021-04-06 PROCEDURE — 96375 TX/PRO/DX INJ NEW DRUG ADDON: CPT

## 2021-04-06 PROCEDURE — 96366 THER/PROPH/DIAG IV INF ADDON: CPT

## 2021-04-06 RX ORDER — SODIUM CHLORIDE 9 MG/ML
20 INJECTION, SOLUTION INTRAVENOUS ONCE
Status: COMPLETED | OUTPATIENT
Start: 2021-04-06 | End: 2021-04-06

## 2021-04-06 RX ORDER — ACETAMINOPHEN 325 MG/1
650 TABLET ORAL ONCE
Status: COMPLETED | OUTPATIENT
Start: 2021-04-06 | End: 2021-04-06

## 2021-04-06 RX ORDER — SODIUM CHLORIDE 9 MG/ML
20 INJECTION, SOLUTION INTRAVENOUS ONCE
Status: CANCELLED | OUTPATIENT
Start: 2021-05-04

## 2021-04-06 RX ORDER — ACETAMINOPHEN 325 MG/1
650 TABLET ORAL ONCE
Status: CANCELLED | OUTPATIENT
Start: 2021-05-04

## 2021-04-06 RX ADMIN — HYDROCORTISONE SODIUM SUCCINATE 100 MG: 100 INJECTION, POWDER, FOR SOLUTION INTRAMUSCULAR; INTRAVENOUS at 08:33

## 2021-04-06 RX ADMIN — Medication 22.5 G: at 08:56

## 2021-04-06 RX ADMIN — DIPHENHYDRAMINE HYDROCHLORIDE 12.5 MG: 50 INJECTION, SOLUTION INTRAMUSCULAR; INTRAVENOUS at 08:31

## 2021-04-06 RX ADMIN — SODIUM CHLORIDE 20 ML/HR: 0.9 INJECTION, SOLUTION INTRAVENOUS at 08:30

## 2021-04-06 RX ADMIN — ACETAMINOPHEN 650 MG: 325 TABLET, FILM COATED ORAL at 08:31

## 2021-04-06 NOTE — PLAN OF CARE
Problem: Potential for Falls  Goal: Patient will remain free of falls  Description: INTERVENTIONS:  - Assess patient frequently for physical needs  -  Identify cognitive and physical deficits and behaviors that affect risk of falls    -  [place] fall precautions as indicated by assessment   - Educate patient/family on patient safety including physical limitations  - Instruct patient to call for assistance with activity based on assessment  - Modify environment to reduce risk of injury  - Consider OT/PT consult to assist with strengthening/mobility  Outcome: Progressing

## 2021-04-06 NOTE — PROGRESS NOTES
Pt arrived to unit without complaint  Pt tolerated IVIG without incident  AVS declined, but aware of future appts  Pt left unit in stable condition

## 2021-04-13 ENCOUNTER — HOSPITAL ENCOUNTER (OUTPATIENT)
Dept: PULMONOLOGY | Facility: HOSPITAL | Age: 66
Discharge: HOME | End: 2021-04-13
Payer: MEDICARE

## 2021-04-13 DIAGNOSIS — R06.00 DYSPNEA ON EXERTION: ICD-10-CM

## 2021-04-13 PROCEDURE — 94726 PLETHYSMOGRAPHY LUNG VOLUMES: CPT

## 2021-04-13 PROCEDURE — 94729 DIFFUSING CAPACITY: CPT

## 2021-04-13 PROCEDURE — 94760 N-INVAS EAR/PLS OXIMETRY 1: CPT

## 2021-04-13 PROCEDURE — 94726 PLETHYSMOGRAPHY LUNG VOLUMES: CPT | Performed by: INTERNAL MEDICINE

## 2021-04-13 PROCEDURE — 94060 EVALUATION OF WHEEZING: CPT | Performed by: INTERNAL MEDICINE

## 2021-04-13 PROCEDURE — 94060 EVALUATION OF WHEEZING: CPT

## 2021-04-13 PROCEDURE — 94729 DIFFUSING CAPACITY: CPT | Performed by: INTERNAL MEDICINE

## 2021-04-13 RX ORDER — ALBUTEROL SULFATE 2.5 MG/3ML
2.5 SOLUTION RESPIRATORY (INHALATION) ONCE AS NEEDED
Status: COMPLETED | OUTPATIENT
Start: 2021-04-13 | End: 2021-04-13

## 2021-04-13 RX ORDER — SODIUM CHLORIDE 9 MG/ML
20 INJECTION, SOLUTION INTRAVENOUS ONCE
Status: CANCELLED | OUTPATIENT
Start: 2021-04-20

## 2021-04-13 RX ORDER — ACETAMINOPHEN 325 MG/1
650 TABLET ORAL ONCE
Status: CANCELLED | OUTPATIENT
Start: 2021-04-20

## 2021-04-13 RX ADMIN — ALBUTEROL SULFATE 2.5 MG: 2.5 SOLUTION RESPIRATORY (INHALATION) at 09:01

## 2021-04-16 DIAGNOSIS — G62.9 NEUROPATHY: ICD-10-CM

## 2021-04-16 RX ORDER — GABAPENTIN 600 MG/1
600 TABLET ORAL DAILY
Qty: 30 TABLET | Refills: 0 | Status: SHIPPED | OUTPATIENT
Start: 2021-04-16 | End: 2021-05-07

## 2021-04-20 ENCOUNTER — HOSPITAL ENCOUNTER (OUTPATIENT)
Dept: INFUSION CENTER | Facility: CLINIC | Age: 66
Discharge: HOME | End: 2021-04-20
Payer: MEDICARE

## 2021-04-20 VITALS
WEIGHT: 234.68 LBS | DIASTOLIC BLOOD PRESSURE: 83 MMHG | HEART RATE: 79 BPM | SYSTOLIC BLOOD PRESSURE: 158 MMHG | BODY MASS INDEX: 31.1 KG/M2 | HEIGHT: 73 IN | RESPIRATION RATE: 20 BRPM | TEMPERATURE: 97 F

## 2021-04-20 DIAGNOSIS — D80.1 HYPOGAMMAGLOBULINEMIA, ACQUIRED (HCC): ICD-10-CM

## 2021-04-20 DIAGNOSIS — D59.10 AUTOIMMUNE HEMOLYTIC ANEMIA (HCC): ICD-10-CM

## 2021-04-20 DIAGNOSIS — C91.10 CLL (CHRONIC LYMPHOCYTIC LEUKEMIA) (HCC): Primary | ICD-10-CM

## 2021-04-20 DIAGNOSIS — E11.9 TYPE 2 DIABETES MELLITUS WITHOUT COMPLICATION, WITH LONG-TERM CURRENT USE OF INSULIN (HCC): ICD-10-CM

## 2021-04-20 DIAGNOSIS — Z79.4 TYPE 2 DIABETES MELLITUS WITHOUT COMPLICATION, WITH LONG-TERM CURRENT USE OF INSULIN (HCC): ICD-10-CM

## 2021-04-20 LAB
ALBUMIN SERPL BCP-MCNC: 3.8 G/DL (ref 3.5–5.7)
ALP SERPL-CCNC: 51 U/L (ref 46–116)
ALT SERPL W P-5'-P-CCNC: 47 U/L (ref 12–78)
ANION GAP SERPL CALCULATED.3IONS-SCNC: 4 MMOL/L (ref 4–13)
AST SERPL W P-5'-P-CCNC: 39 U/L (ref 5–45)
BASOPHILS # BLD MANUAL: 0 THOUSAND/UL (ref 0–0.1)
BASOPHILS NFR MAR MANUAL: 0 % (ref 0–1)
BILIRUB SERPL-MCNC: 0.6 MG/DL (ref 0.2–1)
BUN SERPL-MCNC: 21 MG/DL (ref 5–25)
CALCIUM SERPL-MCNC: 9.7 MG/DL (ref 8.3–10.1)
CHLORIDE SERPL-SCNC: 110 MMOL/L (ref 98–108)
CO2 SERPL-SCNC: 27 MMOL/L (ref 21–32)
CREAT SERPL-MCNC: 1.3 MG/DL (ref 0.6–1.3)
EOSINOPHIL # BLD MANUAL: 0.29 THOUSAND/UL (ref 0–0.4)
EOSINOPHIL NFR BLD MANUAL: 7 % (ref 0–6)
ERYTHROCYTE [DISTWIDTH] IN BLOOD BY AUTOMATED COUNT: 14 % (ref 11.6–15.1)
EST. AVERAGE GLUCOSE BLD GHB EST-MCNC: 166 MG/DL
GFR SERPL CREATININE-BSD FRML MDRD: 57 ML/MIN/1.73SQ M
GLUCOSE SERPL-MCNC: 174 MG/DL (ref 65–140)
HBA1C MFR BLD: 7.4 %
HCT VFR BLD AUTO: 42.8 % (ref 36.5–49.3)
HGB BLD-MCNC: 14.3 G/DL (ref 12–17)
HGB RETIC QN AUTO: 33.3 PG (ref 30–38.3)
IGG SERPL-MCNC: 565 MG/DL (ref 700–1600)
IMM RETICS NFR: 12 % (ref 0–14)
LYMPHOCYTES # BLD AUTO: 0.75 THOUSAND/UL (ref 0.6–4.47)
LYMPHOCYTES # BLD AUTO: 18 % (ref 14–44)
MCH RBC QN AUTO: 30.8 PG (ref 26.8–34.3)
MCHC RBC AUTO-ENTMCNC: 33.4 G/DL (ref 31.4–37.4)
MCV RBC AUTO: 92 FL (ref 82–98)
MONOCYTES # BLD AUTO: 0.42 THOUSAND/UL (ref 0–1.22)
MONOCYTES NFR BLD: 10 % (ref 4–12)
NEUTROPHILS # BLD MANUAL: 2.71 THOUSAND/UL (ref 1.85–7.62)
NEUTS SEG NFR BLD AUTO: 65 % (ref 43–75)
PLATELET # BLD AUTO: 141 THOUSANDS/UL (ref 149–390)
PLATELET BLD QL SMEAR: ABNORMAL
PMV BLD AUTO: 11.2 FL (ref 8.9–12.7)
POTASSIUM SERPL-SCNC: 3.9 MMOL/L (ref 3.5–5.3)
PROT SERPL-MCNC: 6.3 G/DL (ref 6.4–8.2)
RBC # BLD AUTO: 4.65 MILLION/UL (ref 3.88–5.62)
RBC MORPH BLD: NORMAL
RETICS # AUTO: ABNORMAL 10*3/UL (ref 14356–105094)
RETICS # CALC: 2.7 % (ref 0.37–1.87)
SODIUM SERPL-SCNC: 141 MMOL/L (ref 136–145)
TOTAL CELLS COUNTED SPEC: 100
WBC # BLD AUTO: 4.17 THOUSAND/UL (ref 4.31–10.16)

## 2021-04-20 PROCEDURE — 96413 CHEMO IV INFUSION 1 HR: CPT

## 2021-04-20 PROCEDURE — 82784 ASSAY IGA/IGD/IGG/IGM EACH: CPT | Performed by: INTERNAL MEDICINE

## 2021-04-20 PROCEDURE — 85007 BL SMEAR W/DIFF WBC COUNT: CPT | Performed by: INTERNAL MEDICINE

## 2021-04-20 PROCEDURE — 85046 RETICYTE/HGB CONCENTRATE: CPT | Performed by: INTERNAL MEDICINE

## 2021-04-20 PROCEDURE — 80053 COMPREHEN METABOLIC PANEL: CPT | Performed by: INTERNAL MEDICINE

## 2021-04-20 PROCEDURE — 83036 HEMOGLOBIN GLYCOSYLATED A1C: CPT | Performed by: PHYSICIAN ASSISTANT

## 2021-04-20 PROCEDURE — 96415 CHEMO IV INFUSION ADDL HR: CPT

## 2021-04-20 PROCEDURE — 96367 TX/PROPH/DG ADDL SEQ IV INF: CPT

## 2021-04-20 PROCEDURE — 85027 COMPLETE CBC AUTOMATED: CPT | Performed by: INTERNAL MEDICINE

## 2021-04-20 RX ORDER — ACETAMINOPHEN 325 MG/1
650 TABLET ORAL ONCE
Status: COMPLETED | OUTPATIENT
Start: 2021-04-20 | End: 2021-04-20

## 2021-04-20 RX ORDER — SODIUM CHLORIDE 9 MG/ML
20 INJECTION, SOLUTION INTRAVENOUS ONCE
Status: COMPLETED | OUTPATIENT
Start: 2021-04-20 | End: 2021-04-20

## 2021-04-20 RX ADMIN — DEXAMETHASONE SODIUM PHOSPHATE 20 MG: 10 INJECTION, SOLUTION INTRAMUSCULAR; INTRAVENOUS at 07:59

## 2021-04-20 RX ADMIN — DIPHENHYDRAMINE HYDROCHLORIDE 25 MG: 50 INJECTION, SOLUTION INTRAMUSCULAR; INTRAVENOUS at 08:20

## 2021-04-20 RX ADMIN — OBINUTUZUMAB 1000 MG: 1000 INJECTION, SOLUTION, CONCENTRATE INTRAVENOUS at 09:18

## 2021-04-20 RX ADMIN — ACETAMINOPHEN 650 MG: 325 TABLET, FILM COATED ORAL at 07:57

## 2021-04-20 RX ADMIN — SODIUM CHLORIDE 20 ML/HR: 0.9 INJECTION, SOLUTION INTRAVENOUS at 07:57

## 2021-04-20 NOTE — PROGRESS NOTES
Pt arrived to unit without complaint  Pt tolerated Gazyva infusion without incident  AVS declined, but aware of future appts  Pt left unit in stable condition

## 2021-04-20 NOTE — PLAN OF CARE
Problem: Potential for Falls  Goal: Patient will remain free of falls  Description: INTERVENTIONS:  - Assess patient frequently for physical needs  -  Identify cognitive and physical deficits and behaviors that affect risk of falls    -  [place] fall precautions as indicated by assessment   - Educate patient/family on patient safety including physical limitations  - Instruct patient to call for assistance with activity based on assessment  - Modify environment to reduce risk of injury  - Consider OT/PT consult to assist with strengthening/mobility  Outcome: Progressing

## 2021-04-21 ENCOUNTER — OFFICE VISIT (OUTPATIENT)
Dept: HEMATOLOGY ONCOLOGY | Facility: CLINIC | Age: 66
End: 2021-04-21
Payer: MEDICARE

## 2021-04-21 VITALS
BODY MASS INDEX: 31.69 KG/M2 | SYSTOLIC BLOOD PRESSURE: 150 MMHG | TEMPERATURE: 98.2 F | WEIGHT: 234 LBS | OXYGEN SATURATION: 98 % | DIASTOLIC BLOOD PRESSURE: 84 MMHG | HEIGHT: 72 IN | RESPIRATION RATE: 22 BRPM | HEART RATE: 81 BPM

## 2021-04-21 DIAGNOSIS — R06.02 SOB (SHORTNESS OF BREATH): ICD-10-CM

## 2021-04-21 DIAGNOSIS — G89.3 CANCER RELATED PAIN: ICD-10-CM

## 2021-04-21 DIAGNOSIS — C91.10 CHRONIC LYMPHOCYTIC LEUKEMIA (HCC): Primary | ICD-10-CM

## 2021-04-21 PROCEDURE — 99214 OFFICE O/P EST MOD 30 MIN: CPT | Performed by: PHYSICIAN ASSISTANT

## 2021-04-21 RX ORDER — OXYCODONE HYDROCHLORIDE 10 MG/1
10 TABLET ORAL EVERY 6 HOURS PRN
Qty: 90 TABLET | Refills: 0 | Status: SHIPPED | OUTPATIENT
Start: 2021-04-21 | End: 2022-06-15 | Stop reason: SDUPTHER

## 2021-04-21 NOTE — PROGRESS NOTES
Hematology/Oncology Outpatient Follow-up  Baljinder Saavedra 76 y o  male 1955 [id number]    Date:  4/21/2021      Assessment and Plan:  1  Chronic lymphocytic leukemia [facility])  59-year-old male presents for follow-up regarding history of CLL  He is on Imbruvica 140 mg p o  daily, goes I have a every 28 days, IVIG every 28 days  IgG level remains acceptable however continued IVIG therapy is needed  No change in therapy  He also continues on prednisone 5 mg p o  daily  DEXA scan is up to date from Dec 2019 due to long term steroid use  He follows with endocrinology  He states that he stopped his cholesterol medicine  He is advised to discuss with endocrinology  2  Cancer related pain  Patient patient uses oxycodone sparingly  He was given 90 pills in May 2020 in states that he is almost out at this time  90 pills last in 1 year  The reason for sending in as scheduled is a that he can receive 90 day supply so that this is more affordable and not costly  He does not need to Narcan  He denied needing this  - oxyCODONE (ROXICODONE) 10 MG TABS; Take 1 tablet (10 mg total) by mouth every 6 (six) hours as needed for moderate pain For ongoing pain controlMax Daily Amount: 40 mg  Dispense: 90 tablet; Refill: 0    3 SOB, coughing  He also describes episodes of shortness of breath and coughing  This is worsened with laughing as well as fast talking  Reviewed this is likely related to underlying asthma/COPD  He noticed improvement if he is using nebulizer routinely  However he has not been doing this  He is encouraged to do so  He is also encouraged to use rescue inhaler more frequently  He has associated dizziness after episodes of shortness of breath with coughing  If call him a better under control the dizziness would then be improved    He did recently have PFTs which were normal   He is also encouraged to schedule a follow-up with GI secondary to previous history of strictures with symptoms of recurrence  He states that he will do this  HPI:  Oncology History   CLL (chronic lymphocytic leukemia) ([facility] [address] )   8/22/2017 -  Chemotherapy    [No matching medication found in this treatment plan]     4/24/2018 Initial Diagnosis    CLL (chronic lymphocytic leukemia) [facility])       56-year-old male presents for follow-up regarding a longstanding history of CLL as well as hemolytic anemia related to his CLL      On 3/20/17 patient found to have hemoglobin of 6 2, ,000  He was admitted to [facility] for blood transfusion and plan to transfer to [address] [place] [address]  On 3/20/17 WBC count 195,000, hemoglobin 4 9, platelet count 65  Direct coomb's was positive with undetectable haptoglobin  He was given 2 units of PRBCs, Solu-Medrol 1 g ×3 days and IVIG 1 g/kg daily ×3 days  His hemoglobin continued to drop  Bone marrow biopsy on 3/21 showed marrow cellularity of greater than 95% almost replaced by small lymphocytes, lambda light chain restricted, CD20 positive, CD19 positive, CD23 positive partially expressing CD11c and CD25 and CD5 positive and negative for CD 79 and CD38  He was treated with single dose of chlorambucil to control his CLL   3/22 second dose of chlorambucil, also Rituxan 375 mg/M ² autoimmune hemolytic anemia  WBC continued to rise, 266,000  Patient initiated with Venetoclax 20 mg daily  Tumor lysis monitored every 8 hours; given aggressive hydration and Lasix and remained euvolemic  3/24-WBC dropped to 112,000  3/25- WBC 63,000  3/26-WBC 15,000, , platelet count 25,581  Patient became febrile, 101 3  The cefepime initiated Venetoclax held  3/28-patient fell from bed, CT head negative  ANC 1200, cefepime discontinued and Levaquin 75 mg daily started sliding 3/29 given second dose of rituximab 375 mg/m ²   3/30-WBC meg to 14 5 thousand, platelets 90,151, Venetoclax restarted 10 mg every other day  3/31 WBC 4 4, , platelet count 81,084    4/1-4/4: WBC maintained less than 10,000, ANC and platelet count meg  He was discharged on Venetoclax 10 mg every other day  He was instructed to discontinue simvastatin, Ranexa, aspirin, metoprolol 50 mg q  day, losartan 50 mg q  day, Plavix 75 mg q  day, folic acid 996 µg b i d , prednisone 60 mg, allopurinol  He was advised to continue Levaquin 750 mg daily for 10 days, Lexapro 10 mg daily, fenofibrate 50 mg daily, gabapentin 100 mg twice daily, Protonix 40 mg daily     Imaging studies performed at [facility]:  Patient had echocardiogram while inpatient at [facility] on 3/21  This study was unremarkable  CT chest abdomen pelvis without contrast on 3/24 showed stable pulmonary nodules, patchy groundglass densities of lower lobes previously identified and which may represent volume loss or early infiltrate  Persistent diffuse bronchial wall thickening suggesting acute/chronic bronchitis changes  No bronchiectasis  No masses  Stable lymphadenopathy of mediastinum  Stable axillary lymphadenopathy  Splenomegaly 23 9 cm  Extensive lymphadenopathy throughout the entire retroperitoneal, small bowel mesentery, and pelvis  Largest lymph node in right lower quadrant measured 4 6 x 4 8  Stable enlarged left para-aortic lymph node measuring 6 2 x 6 8   4/12/17: Patient received one dose of Rituxan on 4/7/17  Venetoclax 10 mg every other day 4/5/17 - 4/9/17  Venetoclax 10 mg every day beginning 4/10/17  Monitoring CBC 3 times per week  4/28/17: patient had follow-up appointment at [facility] with Dr Floyd Sotelo  She recommended to slowly increase dose of veneteclax  Also, she recommended as he has had numerous treatments with Rituxan, if antibody directed therapy becomes indicated in the future recommendation was with [facility]  She will be seeing her on a p r n  Basis      July 2017- Hemolysis  Disease not under control with veneteclax   Started prednisone 60 mg daily, folic acid, IBRUTINIB 112 mg daily and Gazyva       Sept 2017- 9/18-9/20 patient was admitted due to uncontrolled hyperglycemia with new onset DM type II due to chronic prednisone use for CLL/hemolytic anemia; also found to have profound neutropenia  Ibrutinib dose was reduced to 280 mg daily      October 2017- 10/7/17 - 10/14/17 patient was admitted due to cellulitis on his scalp     Dec 2017- Kathey Inch decreased to 140 mg PO daily due to thrombocytopenia      4/23 - 4/27 patient was admitted due to listeria bacteremia  He was discharged on IV ampicillin  Echo negative for vegetations  Imbruvica and Anabella Hale was on hold at that time  Repeat CBC on 5/14/18 showed normal ANC, and hemoglobin  Platelets adequate at 83K      10/18/18 the patient EGD   This showed distal esophageal stricture   This was dilated to 18 mm using a balloon dilator   He could not dilate any further due to bleeding secondary to thrombocytopenia  Interval history: Patient has episodes 5-6 times a day; cough (wet and dry), lightheaded/nausea; SOB after cough     ROS: Review of Systems   Constitutional: Positive for fatigue  Negative for appetite change, chills, fever and unexpected weight change  HENT: Positive for trouble swallowing (with solid food )  Respiratory: Negative for cough and shortness of breath  Cardiovascular: Negative for leg swelling  Gastrointestinal: Positive for constipation and nausea  Negative for abdominal pain and blood in stool  Genitourinary: Negative for difficulty urinating and dysuria  Musculoskeletal: Negative for arthralgias  Skin: Negative  Neurological: Positive for numbness (and tingling of hands/feets)  Hematological: Bruises/bleeds easily  Psychiatric/Behavioral: Negative          Past Medical History:   Diagnosis Date    Anemia     Arthritis     CAD (coronary artery disease) 2004    Cellulitis of scalp     CKD (chronic kidney disease) stage 3, GFR 30-59 ml/min     [facility] [address] [facility]     COPD [facility]chronic obstructive pulmonary disease) ([facility] [address] )     Diabetes mellitus ([facility] [address] )     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    Dyslipidemia     Edema extremities     Esophageal ulcer     H/O blood clots     Hemolytic anemia (HCC)     Hiatal hernia     History of TIA (transient ischemic attack)     Hyperlipidemia     Hypertension     Listeria infection 2018    Multiple gastric ulcers     Myocardial infarction ([facility] [address] ) 2004    acute    Neutropenia (HCC)     Obese     Recurrent sinusitis     Sleep apnea     Thrombocytopenia (HCC)     Vertigo        Past Surgical History:   Procedure Laterality Date    ARTHROSCOPIC REPAIR ACL      lt knee    CARDIAC SURGERY      cardiac cath with stent    FOOT SURGERY      IR PORT CHECK  5/17/2019    KNEE ARTHROSCOPY      OTHER SURGICAL HISTORY      cardiac cath lesion 1, 1st adjunct treat device: stent    PORTACATH PLACEMENT      PA ESOPHAGOGASTRODUODENOSCOPY TRANSORAL DIAGNOSTIC N/A 10/5/2017    Procedure: ESOPHAGOGASTRODUODENOSCOPY (EGD) with bx;  Surgeon: Mita Barlow DO;  Location: [facility]; Service: Gastroenterology    PA ESOPHAGOGASTRODUODENOSCOPY TRANSORAL DIAGNOSTIC N/A 3/8/2018    Procedure: ESOPHAGOGASTRODUODENOSCOPY (EGD) with biopsy;  Surgeon: Mita Barlow DO;  [facility]: [facility]; Service: Gastroenterology    PA ESOPHAGOGASTRODUODENOSCOPY TRANSORAL DIAGNOSTIC N/A 6/20/2018    Procedure: ESOPHAGOGASTRODUODENOSCOPY (EGD) with Dilation;  Surgeon: Mita Barlow DO;  [facility]: [facility]; Service: Gastroenterology    PA ESOPHAGOGASTRODUODENOSCOPY TRANSORAL DIAGNOSTIC N/A 10/18/2018    Procedure: ESOPHAGOGASTRODUODENOSCOPY (EGD) with dilation;  Surgeon: Lvaonne Rao MD;  Location: [facility];   Service: Gastroenterology    PA ESOPHAGOSCOPY FLEXIBLE TRANSORAL WITH BIOPSY N/A 9/2/2016    Procedure: ESOPHAGOGASTRODUODENOSCOPY (EGD); ESOPHAGEAL DILATION; ESOPHAGEAL BIOPSY;  Surgeon: Donovan Nicole MD;  [facility];  Service: Thoracic    TONSILLECTOMY      UPPER GASTROINTESTINAL ENDOSCOPY      x 6       Social History     Socioeconomic History    Marital status: /Civil Union     Spouse name: None    Number of children: None    Years of education: None    Highest education level: None   Occupational History    Occupation: [employer]    Financial resource strain: None    Food insecurity     Worry: None     Inability: None    Transportation needs     Medical: None     Non-medical: None   Tobacco Use    Smoking status: Former Smoker     Packs/day: 1 00     Years: 10 00     Pack years: 10 00     Types: Cigarettes     Quit date: 2015     Years since quittin 1    Smokeless tobacco: Never Used   Substance and Sexual Activity    Alcohol use:  Yes     Alcohol/week: 2 0 standard drinks     Types: 2 Cans of beer per week     Comment: social use as per [employer]    Drug use: No    Sexual activity: None   Lifestyle    Physical activity     Days per week: None     Minutes per session: None    Stress: None   Relationships    Social connections     Talks on phone: None     Gets together: None     Attends Faith service: None     Active member of club or organization: None     Attends meetings of clubs or organizations: None     Relationship status: None    Intimate partner violence     Fear of current or ex partner: None     Emotionally abused: None     Physically abused: None     Forced sexual activity: None   Other Topics Concern    None   Social History Narrative    None       Family History   Problem Relation Age of Onset    Leukemia Mother         chronic    Colon polyps Mother         sidmoid    Parkinsonism Father        Allergies   Allergen Reactions    Heparin Other (See Comments)     Other reaction(s): Blood Disorders  clot    Macrolides And Ketolides Other (See Comments)     Interacts with other meds he is taking         Current Outpatient Medications:     acyclovir (ZOVIRAX) 400 MG tablet, TAKE 1 TABLET TWICE A DAY, Disp: 60 tablet, Rfl: 11    amLODIPine (NORVASC) 10 mg tablet, Take 1 tablet (10 mg total) by mouth daily, Disp: 90 tablet, Rfl: 3    aspirin 81 MG tablet, Take 81 mg by mouth every other day Every other day , Disp: , Rfl:     fenofibrate (TRICOR) 145 mg tablet, TAKE 1 TABLET DAILY, Disp: 90 tablet, Rfl: 5    folic acid (FOLVITE) 1 mg tablet, Take 800 mcg by mouth daily , Disp: , Rfl:     furosemide (LASIX) 40 mg tablet, TAKE 1 TABLET DAILY, Disp: 90 tablet, Rfl: 5    gabapentin (NEURONTIN) 600 MG tablet, Take 1 tablet (600 mg total) by mouth daily, Disp: 30 tablet, Rfl: 0    glucose monitoring kit (FREESTYLE) monitoring kit, 1 each by Does not apply route as needed ( ) E 11 9, Disp: 1 each, Rfl: 0    insulin lispro (HumaLOG) 100 units/mL injection pen, Use 5-10 units before meals as needed for steroid induced hyperglycemia, Disp: 5 pen, Rfl: 3    Insulin Pen Needle (BD Pen Needle Inez U/F) 32G X 4 MM MISC, Use 3 (three) times a day, Disp: 300 each, Rfl: 1    Insulin Syringe-Needle U-100 30G X 1/2" 0 3 ML MISC, by Does not apply route 2 (two) times a day, Disp: 100 each, Rfl: 6    Lancets (FREESTYLE) lancets, Use as instructed--test 2 times a day  E 11 9, Disp: 100 each, Rfl: 0    LORazepam (ATIVAN) 0 5 mg tablet, Take 1 tablet (0 5 mg total) by mouth daily as needed for anxiety, Disp: 30 tablet, Rfl: 0    losartan (COZAAR) 100 MG tablet, TAKE 1 TABLET DAILY, Disp: 90 tablet, Rfl: 5    multivitamin (THERAGRAN) TABS, Take 1 tablet by mouth daily, Disp: , Rfl:     naloxone (NARCAN) 4 mg/0 1 mL nasal spray, Administer 1 spray into a nostril   If breathing does not return to normal or if breathing difficulty resumes after 2-3 minutes, give another dose in the other nostril using a new spray , Disp: 1 each, Rfl: 1    obinutuzumab (GAZYVA) 1000 mg/40 mL SOLN, Infuse into a venous catheter, Disp: , Rfl:     oxyCODONE (ROXICODONE) 10 MG TABS, Take 1 tablet (10 mg total) by mouth every 6 (six) hours as needed for moderate pain For ongoing pain controlMax Daily Amount: 40 mg, Disp: 90 tablet, Rfl: 0    pantoprazole (PROTONIX) 40 mg tablet, Take 1 tablet (40 mg total) by mouth daily, Disp: 90 tablet, Rfl: 2    Potassium (POTASSIMIN PO), Take 20 mEq by mouth daily, Disp: , Rfl:     predniSONE 5 mg tablet, Take 1 tablet (5 mg total) by mouth daily, Disp: 90 tablet, Rfl: 2    sodium chloride, PF, 0 9 %, 10 mL by Intracatheter route see administration instructions 10mL into port before and after ampicillin doses, Disp: , Rfl: 0    Ventolin  (90 Base) MCG/ACT inhaler, USE 2 INHALATIONS FOUR TIMES A DAY AS NEEDED FOR WHEEZING, Disp: 36 g, Rfl: 5    zolpidem (AMBIEN) 5 mg tablet, Take 1 tablet (5 mg total) by mouth daily at bedtime as needed for sleep (Patient taking differently: Take 5 mg by mouth daily at bedtime as needed for sleep ), Disp: 30 tablet, Rfl: 0    citalopram (CeleXA) 40 mg tablet, Take 1 tablet (40 mg total) by mouth daily for 28 days (Patient not taking: Reported on 3/15/2021), Disp: 14 tablet, Rfl: 1    Ibrutinib 140 MG TABS, Take 140 mg by mouth daily, Disp: 30 tablet, Rfl: 11    metFORMIN (GLUCOPHAGE-XR) 500 mg 24 hr tablet, Take 2 tablets (1,000 mg total) by mouth daily with dinner (Patient not taking: Reported on 3/29/2021), Disp: 180 tablet, Rfl: 1  No current facility-administered medications for this visit  Physical Exam:  /84 (BP Location: Left arm, Patient Position: Sitting, Cuff Size: Adult)   Pulse 81   Temp 98 2 °F (36 8 °C)   Resp 22   Ht 6' (1 829 m)   Wt 106 kg (234 lb)   SpO2 98%   BMI 31 74 kg/m²     Physical Exam  Vitals signs reviewed  Constitutional:       General: He is not in acute distress  Appearance: He is well-developed  HENT:      Head: Normocephalic and atraumatic  Eyes:      General: No scleral icterus  Conjunctiva/sclera: Conjunctivae normal    Neck:      Musculoskeletal: Normal range of motion and neck supple     Cardiovascular: Rate and Rhythm: Normal rate and regular rhythm  Heart sounds: Normal heart sounds  No murmur  Pulmonary:      Effort: Pulmonary effort is normal  No respiratory distress  Breath sounds: Normal breath sounds  Abdominal:      Palpations: Abdomen is soft  Tenderness: There is no abdominal tenderness  Musculoskeletal: Normal range of motion  General: No tenderness  Lymphadenopathy:      Cervical: No cervical adenopathy  Skin:     General: Skin is warm and dry  Comments: Vascular purpura bilateral upper extremities   Neurological:      Mental Status: He is alert and oriented to person, place, and time  Cranial Nerves: No cranial nerve deficit  Psychiatric:         Mood and Affect: Mood normal          Behavior: Behavior normal        Labs:  Lab Results   Component Value Date    WBC 4 17 (L) 04/20/2021    HGB 14 3 04/20/2021    HCT 42 8 04/20/2021    MCV 92 04/20/2021     (L) 04/20/2021     Lab Results   Component Value Date     12/29/2015    K 3 9 04/20/2021     (H) 04/20/2021    CO2 27 04/20/2021    ANIONGAP 8 12/29/2015    BUN 21 04/20/2021    CREATININE 1 30 04/20/2021    GLUCOSE 109 12/29/2015    GLUF 170 (H) 12/01/2020    CALCIUM 9 7 04/20/2021    CORRECTEDCA 10 0 03/23/2021    AST 39 04/20/2021    ALT 47 04/20/2021    ALKPHOS 51 04/20/2021    PROT 5 9 (L) 12/29/2015    BILITOT 0 7 12/29/2015    EGFR 57 04/20/2021     Patient voiced understanding and agreement in the above discussion  Aware to contact our office with questions/symptoms in the interim  This note has been generated by voice recognition software system  Therefore, there may be spelling, grammar, and or syntax errors  Please contact if questions arise

## 2021-05-01 DIAGNOSIS — E78.2 MIXED HYPERLIPIDEMIA: ICD-10-CM

## 2021-05-02 RX ORDER — FENOFIBRATE 145 MG/1
TABLET, COATED ORAL
Qty: 90 TABLET | Refills: 5 | Status: SHIPPED | OUTPATIENT
Start: 2021-05-02 | End: 2022-06-20

## 2021-05-03 ENCOUNTER — OFFICE VISIT (OUTPATIENT)
Dept: ENDOCRINOLOGY | Facility: CLINIC | Age: 66
End: 2021-05-03
Payer: MEDICARE

## 2021-05-03 VITALS
HEART RATE: 76 BPM | WEIGHT: 237.8 LBS | HEIGHT: 72 IN | BODY MASS INDEX: 32.21 KG/M2 | SYSTOLIC BLOOD PRESSURE: 138 MMHG | DIASTOLIC BLOOD PRESSURE: 72 MMHG

## 2021-05-03 DIAGNOSIS — E09.9 STEROID-INDUCED DIABETES MELLITUS, SEQUELA (HCC): Primary | ICD-10-CM

## 2021-05-03 DIAGNOSIS — T38.0X5S STEROID-INDUCED DIABETES MELLITUS, SEQUELA (HCC): Primary | ICD-10-CM

## 2021-05-03 PROCEDURE — 99214 OFFICE O/P EST MOD 30 MIN: CPT | Performed by: INTERNAL MEDICINE

## 2021-05-03 RX ORDER — INSULIN DEGLUDEC INJECTION 100 U/ML
12 INJECTION, SOLUTION SUBCUTANEOUS DAILY
Qty: 15 ML | Refills: 1 | Status: SHIPPED | OUTPATIENT
Start: 2021-05-03 | End: 2021-05-20 | Stop reason: SDUPTHER

## 2021-05-03 NOTE — PATIENT INSTRUCTIONS
Take 12 units humalog  at the start of chemo   4 hours later check f s before eating check again - take 10-15 units again   Hypoglycemia in a Person with Diabetes   WHAT YOU NEED TO KNOW:   Hypoglycemia is a serious condition that happens when your blood glucose (sugar) level drops too low  The blood sugar level is usually too high in a person with diabetes, but the level can also drop too low  It is important to follow your diabetes management plan to keep your blood sugar level steady  DISCHARGE INSTRUCTIONS:   You or someone close to you needs to call the local emergency number (911 in the [address]) if:   · You have a seizure or pass out  · Your blood sugar is less than 50 mg/dL and does not respond to treatment  · You feel you are going to pass out  · You have trouble thinking clearly  Call your diabetes care team if:   · You have had symptoms of low blood sugar several times  · You have questions about the amount of insulin or diabetes medicine you are taking  · You have questions or concerns about your condition or care  Medicines:   · Insulin or diabetes medicine  help to keep your blood sugar under control  · Glucagon  may be needed if you have severe hypoglycemia  · Take your medicine as directed  Contact your healthcare provider if you think your medicine is not helping or if you have side effects  Tell him or her if you are allergic to any medicine  Keep a list of the medicines, vitamins, and herbs you take  Include the amounts, and when and why you take them  Bring the list or the pill bottles to follow-up visits  Carry your medicine list with you in case of an emergency  Manage hypoglycemia:   · Check your blood sugar level right away if you have symptoms of hypoglycemia  Hypoglycemia is usually 70 mg/dL or below  Ask your diabetes care team what blood sugar level is too low for you      · If your blood sugar level is too low, eat or drink 15 grams of fast-acting carbohydrate  Examples of this amount of fast-acting carbohydrate are 4 ounces (½ cup) of fruit juice or 4 ounces of regular soda  Other examples are 2 tablespoons of raisins or 1 tube of glucose gel  Check your blood sugar level 15 minutes later  Sit still as you wait  If the level is still low (less than 100 mg/dL), have another 15 grams of carbohydrate  When the level returns to 100 mg/dL, eat a meal if it is time  If your meal time is more than 1 hour away, eat a snack  The snack should contain carbohydrates, such as the following:     ? 3/4 cup of cereal    ? 1 cup of skim or low fat milk    ? 6 soda crackers    ? 1/2 of a turkey sandwich    ? 15 fat-free chips  This will help prevent another drop in blood sugar  Always carefully follow your diabetes care team's instructions on how to treat low blood sugar levels  · Always carry a source of fast-acting carbohydrate  If you have symptoms of hypoglycemia and you do not have a blood glucose meter, have a source of fast-acting carbohydrate anyway  Avoid carbohydrate foods that are high in fat  The fat content may make the carbohydrate take longer to increase your blood sugar level  Ask your diabetes care team if you should carry a glucagon kit  Glucagon is a medicine that is injected when you develop severe hypoglycemia and become unconscious  Check the expiration date every month and replace it before it expires  · Teach others how to help you if you have symptoms of hypoglycemia  Tell them about the symptoms of hypoglycemia  Ask them to give you a source of fast-acting carbohydrate if you cannot get it yourself  Ask them to give you a glucagon injection if you have signs of hypoglycemia and you become unconscious or have a seizure  Ask them to call the local emergency number (911 in the [address] [place] [address],[address])   This is an emergency  Tell them never to try to make you swallow anything if you faint or have a seizure      · Wear medical alert jewelry  or carry a card that says you have diabetes  Ask where to get these items  Prevent hypoglycemia:   · Take diabetes medicine as directed  Take your medicine at the right time and in the right amount  Do not  double the amount of medicine you take unless instructed by your diabetes care team      · Eat regular meals and snacks  Talk to your dietitian or diabetes care team about a meal plan that is right for you  Do not skip meals  · Check your blood sugar level as directed  Ask your diabetes care team what your blood sugar levels should be before and after you eat  Ask when and how often to check your blood sugar level  You may need to check at least 3 times each day  Record your blood sugar level results and take the record with you when you see your care team  Changes may need to be made to your medicine, food, or exercise schedules using the record  · Check your blood sugar level before you exercise  Physical activity, such as exercise, can decrease your blood sugar level  If your blood sugar level is less than 100 mg/dL, have a carbohydrate snack  Examples are 4 to 6 crackers, ½ banana, 8 ounces (1 cup) of nonfat or 1% milk, or 4 ounces (½ cup) of juice  If you will be active for more than 1 hour, you may need to check your blood sugar level every 30 minutes  Your diabetes care team may also recommend that you check your blood sugar level after your activity  · Know the risks if you choose to drink alcohol  Alcohol can cause your blood sugar levels to be low if you use insulin  Alcohol can cause high blood sugar levels and weight gain if you drink too much  Women 21 years or older and men 72 years or older should limit alcohol to 1 drink a day  Men aged 24 to 59 years should limit alcohol to 2 drinks a day  A drink of alcohol is 12 ounces of beer, 5 ounces of wine, or 1½ ounces of liquor  Follow up with your diabetes care team or specialist as directed:   You may need dose changes to your insulin or oral diabetes medicine if you have hypoglycemia  Write down your questions so you remember to ask them during your visits  © Copyright [address] Information is for End User's use only and may not be sold, redistributed or otherwise used for commercial purposes  All illustrations and images included in CareNotes® are the copyrighted property of [employer]  or Adriana Douglass  The above information is an  only  It is not intended as medical advice for individual conditions or treatments  Talk to your doctor, nurse or pharmacist before following any medical regimen to see if it is safe and effective for you

## 2021-05-03 NOTE — PROGRESS NOTES
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       Lab Results   Component Value Date    HGBA1C 7 4 (H) 04/20/2021   A1c above goal-for now advised to Take 12 units humalog  at the start of chemo   4 hours later check f s before eating check again - take 10-15 units again   Will also start basal insulin-monitor blood sugar twice a day and send over log in 2 weeks  Relevant Medications    insulin degludec Girtha Camel FlexTouch) 100 units/mL injection pen    Other Relevant Orders    Comprehensive metabolic panel Lab Collect    HEMOGLOBIN A1C W/ EAG ESTIMATION Lab Collect        CC: Diabetes    History of Present Illness     HPI:  48-year-old male with steroid induced diabetes seen in follow-up  He is currently on Prednisone 5 mg daily and receives  Extra steroids with chemo every 2 weeks     Takes Humalog for 1-2 days around the time of infusion     Checks f s daily - fasting 150-180s -  Has been taking humalog daily in morning about 10 units   Day of chemo - sugars can run 300s   Takes 7 units     Was on metformin stopped due to loose stools     No polyuria , polydipsia , no blurry vision , numbness and tingling in feet         Review of Systems    Historical Information   Past Medical History:   Diagnosis Date    Anemia     Arthritis     CAD (coronary artery disease) 2004    Cellulitis of scalp     CKD (chronic kidney disease) stage 3, GFR 30-59 ml/min ([facility])     CLL (chronic lymphocytic leukemia) ([facility] )     COPD (chronic obstructive pulmonary disease) ([facility] )     Diabetes mellitus ([facility] )     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    Dyslipidemia     Edema extremities     Esophageal ulcer     H/O blood clots     Hemolytic anemia (HCC)     Hiatal hernia     History of TIA (transient ischemic attack)     Hyperlipidemia     Hypertension     Listeria infection 2018  Multiple gastric ulcers     Myocardial infarction [facility]) 2004    acute    Neutropenia (HCC)     Obese     Recurrent sinusitis     Sleep apnea     Thrombocytopenia (HCC)     Vertigo      Past Surgical History:   Procedure Laterality Date    ARTHROSCOPIC REPAIR ACL      lt knee    CARDIAC SURGERY      cardiac cath with stent    FOOT SURGERY      IR PORT CHECK  5/17/2019    KNEE ARTHROSCOPY      OTHER SURGICAL HISTORY      cardiac cath lesion 1, 1st adjunct treat device: stent    PORTACATH PLACEMENT      [place] ESOPHAGOGASTRODUODENOSCOPY TRANSORAL DIAGNOSTIC N/A 10/5/2017    Procedure: ESOPHAGOGASTRODUODENOSCOPY (EGD) with bx;  Surgeon: Lieutenant Arden DO;  [facility]; Service: Gastroenterology    WA ESOPHAGOGASTRODUODENOSCOPY TRANSORAL DIAGNOSTIC N/A 3/8/2018    Procedure: ESOPHAGOGASTRODUODENOSCOPY (EGD) with biopsy;  Surgeon: Lieutenant Arden DO;  [facility]; Service: Gastroenterology    WA ESOPHAGOGASTRODUODENOSCOPY TRANSORAL DIAGNOSTIC N/A 6/20/2018    Procedure: ESOPHAGOGASTRODUODENOSCOPY (EGD) with Dilation;  Surgeon: Lieutenant Arden DO;  [facility]: [facility]; Service: Gastroenterology    WA ESOPHAGOGASTRODUODENOSCOPY TRANSORAL DIAGNOSTIC N/A 10/18/2018    Procedure: ESOPHAGOGASTRODUODENOSCOPY (EGD) with dilation;  Surgeon: Kathy Corbett MD;  [facility]: [facility];   Service: Gastroenterology    WA ESOPHAGOSCOPY FLEXIBLE TRANSORAL WITH BIOPSY N/A 9/2/2016    Procedure: ESOPHAGOGASTRODUODENOSCOPY (EGD); ESOPHAGEAL DILATION; ESOPHAGEAL BIOPSY;  Surgeon: Carrie Duff MD;  Location:  MAIN OR;  Service: Thoracic    TONSILLECTOMY      UPPER GASTROINTESTINAL ENDOSCOPY      x 6     Social History   Social History     Substance and Sexual Activity   Alcohol Use Yes    Alcohol/week: 2 0 standard drinks    Types: 2 Cans of beer per week    Comment: social use as per [employer]     Social History     Substance and Sexual Activity   Drug Use No     Social History     Tobacco Use   Smoking Status Former Smoker    Packs/day: 1 00    Years: 10 00    Pack years: 10     Types: Cigarettes    Quit date: 2015    Years since quittin 1   Smokeless Tobacco Never Used     Family History:   Family History   Problem Relation Age of Onset    Leukemia Mother         chronic    Colon polyps Mother         sidmoid    Parkinsonism Father        Meds/Allergies   Current Outpatient Medications   Medication Sig Dispense Refill    acyclovir (ZOVIRAX) 400 MG tablet TAKE 1 TABLET TWICE A DAY 60 tablet 11    amLODIPine (NORVASC) 10 mg tablet Take 1 tablet (10 mg total) by mouth daily 90 tablet 3    aspirin 81 MG tablet Take 81 mg by mouth every other day Every other day       fenofibrate (TRICOR) 145 mg tablet TAKE 1 TABLET DAILY 90 tablet 5    folic acid (FOLVITE) 1 mg tablet Take 800 mcg by mouth daily       furosemide (LASIX) 40 mg tablet TAKE 1 TABLET DAILY 90 tablet 5    gabapentin (NEURONTIN) 600 MG tablet Take 1 tablet (600 mg total) by mouth daily 30 tablet 0    glucose monitoring kit (FREESTYLE) monitoring kit 1 each by Does not apply route as needed ( ) E 11 9 1 each 0    Ibrutinib 140 MG TABS Take 140 mg by mouth daily 30 tablet 11    insulin lispro (HumaLOG) 100 units/mL injection pen Use 5-10 units before meals as needed for steroid induced hyperglycemia 5 pen 3    Insulin Pen Needle (BD Pen Needle Inez U/F) 32G X 4 MM MISC Use 3 (three) times a day 300 each 1    Insulin Syringe-Needle U-100 30G X 1/2" 0 3 ML MISC by Does not apply route 2 (two) times a day 100 each 6    Lancets (FREESTYLE) lancets Use as instructed--test 2 times a day    E 11 9 100 each 0    LORazepam (ATIVAN) 0 5 mg tablet Take 1 tablet (0 5 mg total) by mouth daily as needed for anxiety 30 tablet 0    losartan (COZAAR) 100 MG tablet TAKE 1 TABLET DAILY 90 tablet 5    multivitamin (THERAGRAN) TABS Take 1 tablet by mouth daily      obinutuzumab (GAZYVA) 1000 mg/40 mL SOLN Infuse into a venous catheter      oxyCODONE (ROXICODONE) 10 MG TABS Take 1 tablet (10 mg total) by mouth every 6 (six) hours as needed for moderate pain For ongoing pain controlMax Daily Amount: 40 mg 90 tablet 0    pantoprazole (PROTONIX) 40 mg tablet Take 1 tablet (40 mg total) by mouth daily 90 tablet 2    Potassium (POTASSIMIN PO) Take 20 mEq by mouth daily      predniSONE 5 mg tablet Take 1 tablet (5 mg total) by mouth daily 90 tablet 2    sodium chloride, PF, 0 9 % 10 mL by Intracatheter route see administration instructions 10mL into port before and after ampicillin doses  0    Ventolin  (90 Base) MCG/ACT inhaler USE 2 INHALATIONS FOUR TIMES A DAY AS NEEDED FOR WHEEZING 36 g 5    zolpidem (AMBIEN) 5 mg tablet Take 1 tablet (5 mg total) by mouth daily at bedtime as needed for sleep (Patient taking differently: Take 5 mg by mouth daily at bedtime as needed for sleep ) 30 tablet 0    citalopram (CeleXA) 40 mg tablet Take 1 tablet (40 mg total) by mouth daily for 28 days (Patient not taking: Reported on 3/15/2021) 14 tablet 1    insulin degludec Kimber Adams FlexTouch) 100 units/mL injection pen Inject 12 Units under the skin daily 15 mL 1     No current facility-administered medications for this visit  Allergies   Allergen Reactions    Heparin Other (See Comments)     Other reaction(s): Blood Disorders  clot    Macrolides And Ketolides Other (See Comments)     Interacts with other meds he is taking       Objective   Vitals: Blood pressure 138/72, pulse 76, height 6' (1 829 m), weight 108 kg (237 lb 12 8 oz)  Physical Exam  Vitals signs reviewed  Constitutional:       Appearance: Normal appearance  He is not ill-appearing or diaphoretic  HENT:      Head: Normocephalic and atraumatic  Eyes:      General: No scleral icterus  Extraocular Movements: Extraocular movements intact  Neck:      Musculoskeletal: Neck supple  Cardiovascular:      Rate and Rhythm: Normal rate and regular rhythm        Heart sounds: Normal heart sounds  No murmur  Pulmonary:      Effort: Pulmonary effort is normal  No respiratory distress  Breath sounds: Normal breath sounds  No wheezing or rales  Abdominal:      General: There is no distension  Palpations: Abdomen is soft  Tenderness: There is no abdominal tenderness  Musculoskeletal:      Right lower leg: No edema  Left lower leg: No edema  Lymphadenopathy:      Cervical: No cervical adenopathy  Neurological:      General: No focal deficit present  Mental Status: He is alert and oriented to person, place, and time  Psychiatric:         Mood and Affect: Mood normal          Behavior: Behavior normal          Thought Content: Thought content normal          Judgment: Judgment normal          The history was obtained from the review of the chart, patient      Lab Results:   Lab Results   Component Value Date/Time    Hemoglobin A1C 7 4 (H) 04/20/2021 07:45 AM    Hemoglobin A1C 8 1 (H) 01/12/2021 08:42 AM    Hemoglobin A1C 7 9 (H) 12/01/2020 08:48 AM    WBC 4 17 (L) 04/20/2021 07:45 AM    WBC 4 00 (L) 03/23/2021 08:29 AM    WBC 4 49 02/23/2021 08:23 AM    Hemoglobin 14 3 04/20/2021 07:45 AM    Hemoglobin 13 0 03/23/2021 08:29 AM    Hemoglobin 13 4 02/23/2021 08:23 AM    Hematocrit 42 8 04/20/2021 07:45 AM    Hematocrit 38 7 03/23/2021 08:29 AM    Hematocrit 40 2 02/23/2021 08:23 AM    MCV 92 04/20/2021 07:45 AM    MCV 91 03/23/2021 08:29 AM    MCV 91 02/23/2021 08:23 AM    Platelets 659 (L) 58/52/4906 07:45 AM    Platelets 831 (L) 65/72/2320 08:29 AM    Platelets 510 25/87/3093 08:23 AM    BUN 21 04/20/2021 07:45 AM    BUN 16 03/23/2021 08:29 AM    BUN 24 02/23/2021 08:23 AM    Potassium 3 9 04/20/2021 07:45 AM    Potassium 3 9 03/23/2021 08:29 AM    Potassium 4 1 02/23/2021 08:23 AM    Chloride 110 (H) 04/20/2021 07:45 AM    Chloride 105 03/23/2021 08:29 AM    Chloride 107 02/23/2021 08:23 AM    CO2 27 04/20/2021 07:45 AM    CO2 28 03/23/2021 08:29 AM CO2 28 02/23/2021 08:23 AM    Creatinine 1 30 04/20/2021 07:45 AM    Creatinine 1 40 (H) 03/23/2021 08:29 AM    Creatinine 1 10 02/23/2021 08:23 AM    AST 39 04/20/2021 07:45 AM    AST 31 03/23/2021 08:29 AM    AST 33 02/23/2021 08:23 AM    ALT 47 04/20/2021 07:45 AM    ALT 39 03/23/2021 08:29 AM    ALT 34 02/23/2021 08:23 AM    Albumin 3 8 04/20/2021 07:45 AM    Albumin 3 4 (L) 03/23/2021 08:29 AM    Albumin 3 7 02/23/2021 08:23 AM    HDL, Direct 30 (L) 01/12/2021 08:42 AM    HDL, Direct 28 (L) 12/01/2020 08:48 AM    Triglycerides 100 01/12/2021 08:42 AM    Triglycerides 138 12/01/2020 08:48 AM           Portions of the record may have been created with voice recognition software  Occasional wrong word or "sound a like" substitutions may have occurred due to the inherent limitations of voice recognition software  Read the chart carefully and recognize, using context, where substitutions have occurred

## 2021-05-04 ENCOUNTER — HOSPITAL ENCOUNTER (OUTPATIENT)
Dept: INFUSION CENTER | Facility: CLINIC | Age: 66
Discharge: HOME | End: 2021-05-04
Payer: MEDICARE

## 2021-05-04 VITALS
BODY MASS INDEX: 31.84 KG/M2 | TEMPERATURE: 97.9 F | DIASTOLIC BLOOD PRESSURE: 82 MMHG | RESPIRATION RATE: 20 BRPM | OXYGEN SATURATION: 98 % | WEIGHT: 234.79 LBS | HEART RATE: 77 BPM | SYSTOLIC BLOOD PRESSURE: 137 MMHG

## 2021-05-04 DIAGNOSIS — D80.1 HYPOGAMMAGLOBULINEMIA, ACQUIRED (HCC): ICD-10-CM

## 2021-05-04 DIAGNOSIS — C91.10 CLL (CHRONIC LYMPHOCYTIC LEUKEMIA) (HCC): Primary | ICD-10-CM

## 2021-05-04 PROCEDURE — 96367 TX/PROPH/DG ADDL SEQ IV INF: CPT

## 2021-05-04 PROCEDURE — 96365 THER/PROPH/DIAG IV INF INIT: CPT

## 2021-05-04 PROCEDURE — 96366 THER/PROPH/DIAG IV INF ADDON: CPT

## 2021-05-04 PROCEDURE — 96375 TX/PRO/DX INJ NEW DRUG ADDON: CPT

## 2021-05-04 RX ORDER — SODIUM CHLORIDE 9 MG/ML
20 INJECTION, SOLUTION INTRAVENOUS ONCE
Status: CANCELLED | OUTPATIENT
Start: 2021-06-01

## 2021-05-04 RX ORDER — SODIUM CHLORIDE 9 MG/ML
20 INJECTION, SOLUTION INTRAVENOUS ONCE
Status: COMPLETED | OUTPATIENT
Start: 2021-05-04 | End: 2021-05-04

## 2021-05-04 RX ORDER — ACETAMINOPHEN 325 MG/1
650 TABLET ORAL ONCE
Status: COMPLETED | OUTPATIENT
Start: 2021-05-04 | End: 2021-05-04

## 2021-05-04 RX ORDER — ACETAMINOPHEN 325 MG/1
650 TABLET ORAL ONCE
Status: CANCELLED | OUTPATIENT
Start: 2021-06-01

## 2021-05-04 RX ADMIN — SODIUM CHLORIDE 20 ML/HR: 0.9 INJECTION, SOLUTION INTRAVENOUS at 08:33

## 2021-05-04 RX ADMIN — DIPHENHYDRAMINE HYDROCHLORIDE 12.5 MG: 50 INJECTION, SOLUTION INTRAMUSCULAR; INTRAVENOUS at 08:37

## 2021-05-04 RX ADMIN — HYDROCORTISONE SODIUM SUCCINATE 100 MG: 100 INJECTION, POWDER, FOR SOLUTION INTRAMUSCULAR; INTRAVENOUS at 09:12

## 2021-05-04 RX ADMIN — Medication 22.5 G: at 09:31

## 2021-05-04 RX ADMIN — ACETAMINOPHEN 650 MG: 325 TABLET, FILM COATED ORAL at 08:33

## 2021-05-04 NOTE — ASSESSMENT & PLAN NOTE
Lab Results   Component Value Date    HGBA1C 7 4 (H) 04/20/2021   A1c above goal-for now advised to Take 12 units humalog  at the start of chemo   4 hours later check f s before eating check again - take 10-15 units again   Will also start basal insulin-monitor blood sugar twice a day and send over log in 2 weeks

## 2021-05-04 NOTE — PLAN OF CARE
Problem: Potential for Falls  Goal: Patient will remain free of falls  Description: INTERVENTIONS:  - Assess patient frequently for physical needs  -  Identify cognitive and physical deficits and behaviors that affect risk of falls    -  [place] fall precautions as indicated by assessment   - Educate patient/family on patient safety including physical limitations  - Instruct patient to call for assistance with activity based on assessment  - Modify environment to reduce risk of injury  - Consider OT/PT consult to assist with strengthening/mobility  Outcome: Progressing

## 2021-05-07 DIAGNOSIS — G62.9 NEUROPATHY: ICD-10-CM

## 2021-05-07 RX ORDER — GABAPENTIN 600 MG/1
TABLET ORAL
Qty: 30 TABLET | Refills: 11 | Status: SHIPPED | OUTPATIENT
Start: 2021-05-07 | End: 2021-06-08 | Stop reason: SDUPTHER

## 2021-05-13 ENCOUNTER — TELEPHONE (OUTPATIENT)
Dept: FAMILY MEDICINE CLINIC | Facility: CLINIC | Age: 66
End: 2021-05-13

## 2021-05-13 NOTE — TELEPHONE ENCOUNTER
Patient called and stated he had the lung screening done and that everything was "NORMAL"  He said that he has COPD but the test didn't state anything about him having it    He would like to know what is classified as normal for this test  Please advise

## 2021-05-14 ENCOUNTER — TELEPHONE (OUTPATIENT)
Dept: FAMILY MEDICINE CLINIC | Facility: CLINIC | Age: 66
End: 2021-05-14

## 2021-05-14 RX ORDER — SODIUM CHLORIDE 9 MG/ML
20 INJECTION, SOLUTION INTRAVENOUS ONCE
Status: CANCELLED | OUTPATIENT
Start: 2021-05-18

## 2021-05-14 RX ORDER — ACETAMINOPHEN 325 MG/1
650 TABLET ORAL ONCE
Status: CANCELLED | OUTPATIENT
Start: 2021-05-18

## 2021-05-14 NOTE — TELEPHONE ENCOUNTER
Called patient to find what kind of test he was talking about    He said it was the one that was done on 3/15/21 with Dr Cosme Fisher

## 2021-05-18 ENCOUNTER — HOSPITAL ENCOUNTER (OUTPATIENT)
Dept: INFUSION CENTER | Facility: CLINIC | Age: 66
Discharge: HOME | End: 2021-05-18
Payer: MEDICARE

## 2021-05-18 VITALS
DIASTOLIC BLOOD PRESSURE: 78 MMHG | SYSTOLIC BLOOD PRESSURE: 147 MMHG | WEIGHT: 234.57 LBS | RESPIRATION RATE: 16 BRPM | HEART RATE: 76 BPM | TEMPERATURE: 98.6 F | BODY MASS INDEX: 31.81 KG/M2

## 2021-05-18 DIAGNOSIS — C91.10 CLL (CHRONIC LYMPHOCYTIC LEUKEMIA) (HCC): Primary | ICD-10-CM

## 2021-05-18 DIAGNOSIS — D59.10 AUTOIMMUNE HEMOLYTIC ANEMIA (HCC): ICD-10-CM

## 2021-05-18 DIAGNOSIS — D80.1 HYPOGAMMAGLOBULINEMIA, ACQUIRED (HCC): ICD-10-CM

## 2021-05-18 LAB
ALBUMIN SERPL BCP-MCNC: 3.6 G/DL (ref 3.5–5.7)
ALP SERPL-CCNC: 55 U/L (ref 46–116)
ALT SERPL W P-5'-P-CCNC: 38 U/L (ref 12–78)
ANION GAP SERPL CALCULATED.3IONS-SCNC: 3 MMOL/L (ref 4–13)
AST SERPL W P-5'-P-CCNC: 35 U/L (ref 5–45)
BASOPHILS # BLD MANUAL: 0 THOUSAND/UL (ref 0–0.1)
BASOPHILS NFR MAR MANUAL: 0 % (ref 0–1)
BILIRUB SERPL-MCNC: 0.5 MG/DL (ref 0.2–1)
BUN SERPL-MCNC: 19 MG/DL (ref 5–25)
CALCIUM SERPL-MCNC: 9.7 MG/DL (ref 8.3–10.1)
CHLORIDE SERPL-SCNC: 109 MMOL/L (ref 98–108)
CO2 SERPL-SCNC: 28 MMOL/L (ref 21–32)
CREAT SERPL-MCNC: 1 MG/DL (ref 0.6–1.3)
EOSINOPHIL # BLD MANUAL: 0.33 THOUSAND/UL (ref 0–0.4)
EOSINOPHIL NFR BLD MANUAL: 8 % (ref 0–6)
ERYTHROCYTE [DISTWIDTH] IN BLOOD BY AUTOMATED COUNT: 13.3 % (ref 11.6–15.1)
GFR SERPL CREATININE-BSD FRML MDRD: 78 ML/MIN/1.73SQ M
GLUCOSE SERPL-MCNC: 223 MG/DL (ref 65–140)
HCT VFR BLD AUTO: 41.3 % (ref 36.5–49.3)
HGB BLD-MCNC: 13.8 G/DL (ref 12–17)
HGB RETIC QN AUTO: 35 PG (ref 30–38.3)
IGG SERPL-MCNC: 532 MG/DL (ref 700–1600)
IMM RETICS NFR: 14.4 % (ref 0–14)
LG PLATELETS BLD QL SMEAR: PRESENT
LYMPHOCYTES # BLD AUTO: 0.41 THOUSAND/UL (ref 0.6–4.47)
LYMPHOCYTES # BLD AUTO: 10 % (ref 14–44)
MCH RBC QN AUTO: 30.7 PG (ref 26.8–34.3)
MCHC RBC AUTO-ENTMCNC: 33.4 G/DL (ref 31.4–37.4)
MCV RBC AUTO: 92 FL (ref 82–98)
MONOCYTES # BLD AUTO: 0.41 THOUSAND/UL (ref 0–1.22)
MONOCYTES NFR BLD: 10 % (ref 4–12)
MYELOCYTES NFR BLD MANUAL: 1 % (ref 0–1)
NEUTROPHILS # BLD MANUAL: 2.9 THOUSAND/UL (ref 1.85–7.62)
NEUTS SEG NFR BLD AUTO: 71 % (ref 43–75)
PLATELET # BLD AUTO: 129 THOUSANDS/UL (ref 149–390)
PLATELET BLD QL SMEAR: ABNORMAL
PMV BLD AUTO: 10.7 FL (ref 8.9–12.7)
POTASSIUM SERPL-SCNC: 4 MMOL/L (ref 3.5–5.3)
PROT SERPL-MCNC: 5.9 G/DL (ref 6.4–8.2)
RBC # BLD AUTO: 4.5 MILLION/UL (ref 3.88–5.62)
RBC MORPH BLD: NORMAL
RETICS # AUTO: ABNORMAL 10*3/UL (ref 14356–105094)
RETICS # CALC: 1.97 % (ref 0.37–1.87)
SODIUM SERPL-SCNC: 140 MMOL/L (ref 136–145)
TOTAL CELLS COUNTED SPEC: 100
WBC # BLD AUTO: 4.09 THOUSAND/UL (ref 4.31–10.16)

## 2021-05-18 PROCEDURE — 96367 TX/PROPH/DG ADDL SEQ IV INF: CPT

## 2021-05-18 PROCEDURE — 85046 RETICYTE/HGB CONCENTRATE: CPT | Performed by: INTERNAL MEDICINE

## 2021-05-18 PROCEDURE — 85007 BL SMEAR W/DIFF WBC COUNT: CPT | Performed by: INTERNAL MEDICINE

## 2021-05-18 PROCEDURE — 85027 COMPLETE CBC AUTOMATED: CPT | Performed by: INTERNAL MEDICINE

## 2021-05-18 PROCEDURE — 80053 COMPREHEN METABOLIC PANEL: CPT | Performed by: INTERNAL MEDICINE

## 2021-05-18 PROCEDURE — 96415 CHEMO IV INFUSION ADDL HR: CPT

## 2021-05-18 PROCEDURE — 96413 CHEMO IV INFUSION 1 HR: CPT

## 2021-05-18 PROCEDURE — 82784 ASSAY IGA/IGD/IGG/IGM EACH: CPT | Performed by: INTERNAL MEDICINE

## 2021-05-18 RX ORDER — ACETAMINOPHEN 325 MG/1
650 TABLET ORAL ONCE
Status: COMPLETED | OUTPATIENT
Start: 2021-05-18 | End: 2021-05-18

## 2021-05-18 RX ORDER — SODIUM CHLORIDE 9 MG/ML
20 INJECTION, SOLUTION INTRAVENOUS ONCE
Status: COMPLETED | OUTPATIENT
Start: 2021-05-18 | End: 2021-05-18

## 2021-05-18 RX ADMIN — OBINUTUZUMAB 1000 MG: 1000 INJECTION, SOLUTION, CONCENTRATE INTRAVENOUS at 10:03

## 2021-05-18 RX ADMIN — DEXAMETHASONE SODIUM PHOSPHATE 20 MG: 10 INJECTION, SOLUTION INTRAMUSCULAR; INTRAVENOUS at 08:44

## 2021-05-18 RX ADMIN — ACETAMINOPHEN 650 MG: 325 TABLET, FILM COATED ORAL at 08:51

## 2021-05-18 RX ADMIN — SODIUM CHLORIDE 20 ML/HR: 0.9 INJECTION, SOLUTION INTRAVENOUS at 08:43

## 2021-05-18 RX ADMIN — DIPHENHYDRAMINE HYDROCHLORIDE 25 MG: 50 INJECTION, SOLUTION INTRAMUSCULAR; INTRAVENOUS at 09:07

## 2021-05-18 NOTE — PROGRESS NOTES
Patient arrived on unit for treatment today  Denies any present issues/concerns  Labs drawn as ordered  Cleared for treatment as lab parameters on orders   Treatment initiated

## 2021-05-20 DIAGNOSIS — E09.9 STEROID-INDUCED DIABETES MELLITUS, SEQUELA (HCC): ICD-10-CM

## 2021-05-20 DIAGNOSIS — T38.0X5S STEROID-INDUCED DIABETES MELLITUS, SEQUELA (HCC): ICD-10-CM

## 2021-05-20 RX ORDER — INSULIN DEGLUDEC INJECTION 100 U/ML
16 INJECTION, SOLUTION SUBCUTANEOUS DAILY
Qty: 15 ML | Refills: 1 | Status: SHIPPED | OUTPATIENT
Start: 2021-05-20 | End: 2021-07-15 | Stop reason: DRUGHIGH

## 2021-05-24 ENCOUNTER — OFFICE VISIT (OUTPATIENT)
Dept: URGENT CARE | Facility: CLINIC | Age: 66
End: 2021-05-24
Payer: MEDICARE

## 2021-05-24 VITALS
HEART RATE: 80 BPM | OXYGEN SATURATION: 98 % | RESPIRATION RATE: 20 BRPM | SYSTOLIC BLOOD PRESSURE: 139 MMHG | TEMPERATURE: 97.5 F | DIASTOLIC BLOOD PRESSURE: 66 MMHG

## 2021-05-24 DIAGNOSIS — M62.838 MUSCLE SPASM: Primary | ICD-10-CM

## 2021-05-24 PROCEDURE — 99213 OFFICE O/P EST LOW 20 MIN: CPT | Performed by: NURSE PRACTITIONER

## 2021-05-24 PROCEDURE — G0463 HOSPITAL OUTPT CLINIC VISIT: HCPCS | Performed by: NURSE PRACTITIONER

## 2021-05-24 RX ORDER — METHYLPREDNISOLONE 4 MG/1
TABLET ORAL
Qty: 1 EACH | Refills: 0 | Status: SHIPPED | OUTPATIENT
Start: 2021-05-24 | End: 2021-07-21 | Stop reason: ALTCHOICE

## 2021-05-24 RX ORDER — CYCLOBENZAPRINE HCL 10 MG
10 TABLET ORAL 3 TIMES DAILY PRN
Qty: 20 TABLET | Refills: 0 | Status: SHIPPED | OUTPATIENT
Start: 2021-05-24 | End: 2021-12-17 | Stop reason: ALTCHOICE

## 2021-05-24 NOTE — PATIENT INSTRUCTIONS
Spasmodic Torticollis   WHAT YOU NEED TO KNOW:   Spasmodic torticollis, also called cervical dystonia, is a condition that causes your neck muscles to contract abnormally  Your neck may twist and cause your head to tilt to one side, forward, or backward  Spasmodic torticollis may occur occasionally or continuously  It often gets worse with stress  DISCHARGE INSTRUCTIONS:   Medicines: You may need any of the following:  · Muscle relaxers  decrease pain and muscle spasms  · Botulinum toxin injections  may also be given to relax your muscles  · NSAIDs , such as ibuprofen, help decrease swelling, pain, and fever  This medicine is available with or without a doctor's order  NSAIDs can cause stomach bleeding or kidney problems in certain people  If you take blood thinner medicine, always ask if NSAIDs are safe for you  Always read the medicine label and follow directions  Do not give these medicines to children under 10months of age without direction from your child's healthcare provider  · Acetaminophen  decreases pain  It is available without a doctor's order  Ask how much to take and how often to take it  Follow directions  Acetaminophen can cause liver damage if not taken correctly  · Prescription pain medicine  may be given  Ask your healthcare provider how to take this medicine safely  · Take your medicine as directed  Contact your healthcare provider if you think your medicine is not helping or if you have side effects  Tell him if you are allergic to any medicine  Keep a list of the medicines, vitamins, and herbs you take  Include the amounts, and when and why you take them  Bring the list or the pill bottles to follow-up visits  Carry your medicine list with you in case of an emergency  Follow up with your healthcare provider as directed:  Write down your questions so you remember to ask them during your visits    Self-care:   · Apply ice  on your neck for 15 to 20 minutes every hour or as directed  Use an ice pack, or put crushed ice in a plastic bag  Cover it with a towel  Ice helps prevent tissue damage and decreases swelling and pain  · Apply heat  on your neck for 20 to 30 minutes every 2 hours for as many days as directed  Heat helps decrease pain and muscle spasms  · Rest  as needed  Return to your daily activities as directed  · Use cervical collar as directed  A cervical collar may be needed to support your neck  Contact your healthcare provider if:   · You have a fever  · You have swelling in your neck area that gets worse or does not go away  · You have an increased feeling of sadness or loneliness, or you feel depressed  · You have questions or concerns about your condition or care  Return to the emergency department if:   · You have increased pain in your neck or shoulder  · You have sudden shortness of breath  · You have trouble moving your arms or legs  · Your arms or legs feel numb  © Copyright [address] Information is for End User's use only and may not be sold, redistributed or otherwise used for commercial purposes  All illustrations and images included in CareNotes® are the copyrighted property of [employer]  or [facility] Seema Bowers   The above information is an  only  It is not intended as medical advice for individual conditions or treatments  Talk to your doctor, nurse or pharmacist before following any medical regimen to see if it is safe and effective for you

## 2021-05-24 NOTE — PROGRESS NOTES
[facility]        NAME: Flip Gifford is a 76 y o  male  : 1955    MRN: [id number]  DATE: May 24, 2021  TIME: 3:06 PM    Assessment and Plan   Muscle spasm [M62 838]  1  Muscle spasm  cyclobenzaprine (FLEXERIL) 10 mg tablet    methylPREDNISolone 4 MG tablet therapy pack   start course of flexeril and medrol dose pack   Use dose pack only if needed   Aware of increase in blood sugar when taking it   F/u with pcp      Patient Instructions     Follow up with PCP in 3-5 days  Proceed to  ER if symptoms worsen  Chief Complaint     Chief Complaint   Patient presents with    Neck Pain     Patient states that 2 weeks ago he woke up with neck pain  No better since then  Taking OTC Ibuprofen and his narcotic pain meds with no relief         History of Present Illness   Flip Gifford presents to the clinic c/o    Pt states for the past 2 weeks has pain that starts at the base of the left side of the head, down neck, and into right shoulder  Stiff neck - hard to turn to one side or the other  Can't get comfortable  States he likes to sleep but this is making that difficult  Review of Systems   Review of Systems   All other systems reviewed and are negative          Current Medications     Long-Term Medications   Medication Sig Dispense Refill    acyclovir (ZOVIRAX) 400 MG tablet TAKE 1 TABLET TWICE A DAY 60 tablet 11    amLODIPine (NORVASC) 10 mg tablet Take 1 tablet (10 mg total) by mouth daily 90 tablet 3    citalopram (CeleXA) 40 mg tablet Take 1 tablet (40 mg total) by mouth daily for 28 days (Patient not taking: Reported on 3/15/2021) 14 tablet 1    cyclobenzaprine (FLEXERIL) 10 mg tablet Take 1 tablet (10 mg total) by mouth 3 (three) times a day as needed for muscle spasms 20 tablet 0    fenofibrate (TRICOR) 145 mg tablet TAKE 1 TABLET DAILY 90 tablet 5    furosemide (LASIX) 40 mg tablet TAKE 1 TABLET DAILY 90 tablet 5    gabapentin (NEURONTIN) 600 MG tablet TAKE 1 TABLET DAILY 30 tablet 11    glucose monitoring kit (FREESTYLE) monitoring kit 1 each by Does not apply route as needed ( ) E 11 9 1 each 0    Ibrutinib 140 MG TABS Take 140 mg by mouth daily 30 tablet 11    insulin degludec Herschell Heather FlexTouch) 100 units/mL injection pen Inject 16 Units under the skin daily 15 mL 1    insulin lispro (HumaLOG) 100 units/mL injection pen Use 5-10 units before meals as needed for steroid induced hyperglycemia 5 pen 3    Insulin Pen Needle (BD Pen Needle Inez U/F) 32G X 4 MM MISC Use 3 (three) times a day 300 each 1    Insulin Syringe-Needle U-100 30G X 1/2" 0 3 ML MISC by Does not apply route 2 (two) times a day 100 each 6    Lancets (FREESTYLE) lancets Use as instructed--test 2 times a day    E 11 9 100 each 0    LORazepam (ATIVAN) 0 5 mg tablet Take 1 tablet (0 5 mg total) by mouth daily as needed for anxiety 30 tablet 0    losartan (COZAAR) 100 MG tablet TAKE 1 TABLET DAILY 90 tablet 5    multivitamin (THERAGRAN) TABS Take 1 tablet by mouth daily      obinutuzumab (GAZYVA) 1000 mg/40 mL SOLN Infuse into a venous catheter      pantoprazole (PROTONIX) 40 mg tablet Take 1 tablet (40 mg total) by mouth daily 90 tablet 2    sodium chloride, PF, 0 9 % 10 mL by Intracatheter route see administration instructions 10mL into port before and after ampicillin doses  0    zolpidem (AMBIEN) 5 mg tablet Take 1 tablet (5 mg total) by mouth daily at bedtime as needed for sleep (Patient not taking: Reported on 5/24/2021) 30 tablet 0       Current Allergies     Allergies as of 05/24/2021 - Reviewed 05/24/2021   Allergen Reaction Noted    Heparin Other (See Comments) 06/06/2012    Macrolides and ketolides Other (See Comments) 01/13/2016            The following portions of the patient's history were reviewed and updated as appropriate: allergies, current medications, past family history, past medical history, past social history, past surgical history and problem list     Objective   /66 Pulse 80   Temp 97 5 °F (36 4 °C) (Tympanic)   Resp 20   SpO2 98%        Physical Exam     Physical Exam  Vitals signs and nursing note reviewed  Constitutional:       Appearance: Normal appearance  He is well-developed  HENT:      Head: Normocephalic and atraumatic  Eyes:      General: Lids are normal       Conjunctiva/sclera: Conjunctivae normal       Pupils: Pupils are equal, round, and reactive to light  Neck:      Musculoskeletal: Pain with movement present  Cardiovascular:      Rate and Rhythm: Normal rate and regular rhythm  Heart sounds: Normal heart sounds, S1 normal and S2 normal    Pulmonary:      Effort: Pulmonary effort is normal       Breath sounds: Normal breath sounds  Skin:     General: Skin is warm and dry  Neurological:      Mental Status: He is alert  Psychiatric:         Speech: Speech normal          Behavior: Behavior normal          Thought Content:  Thought content normal          Judgment: Judgment normal

## 2021-05-25 DIAGNOSIS — C91.10 CLL (CHRONIC LYMPHOCYTIC LEUKEMIA) (HCC): ICD-10-CM

## 2021-05-25 DIAGNOSIS — F41.9 ANXIETY: ICD-10-CM

## 2021-05-25 RX ORDER — PREDNISONE 1 MG/1
TABLET ORAL
Qty: 90 TABLET | Refills: 5 | Status: SHIPPED | OUTPATIENT
Start: 2021-05-25 | End: 2022-07-20

## 2021-05-25 RX ORDER — CITALOPRAM 40 MG/1
40 TABLET ORAL DAILY
Qty: 10 TABLET | Refills: 1 | Status: SHIPPED | OUTPATIENT
Start: 2021-05-25 | End: 2021-09-22

## 2021-06-01 ENCOUNTER — HOSPITAL ENCOUNTER (OUTPATIENT)
Dept: INFUSION CENTER | Facility: CLINIC | Age: 66
Discharge: HOME | End: 2021-06-01
Payer: MEDICARE

## 2021-06-01 VITALS
WEIGHT: 235.89 LBS | SYSTOLIC BLOOD PRESSURE: 152 MMHG | TEMPERATURE: 98.7 F | DIASTOLIC BLOOD PRESSURE: 84 MMHG | BODY MASS INDEX: 31.99 KG/M2 | HEART RATE: 84 BPM | RESPIRATION RATE: 16 BRPM

## 2021-06-01 DIAGNOSIS — D80.1 HYPOGAMMAGLOBULINEMIA, ACQUIRED (HCC): ICD-10-CM

## 2021-06-01 DIAGNOSIS — C91.10 CLL (CHRONIC LYMPHOCYTIC LEUKEMIA) (HCC): Primary | ICD-10-CM

## 2021-06-01 PROCEDURE — 96375 TX/PRO/DX INJ NEW DRUG ADDON: CPT

## 2021-06-01 PROCEDURE — 96366 THER/PROPH/DIAG IV INF ADDON: CPT

## 2021-06-01 PROCEDURE — 96365 THER/PROPH/DIAG IV INF INIT: CPT

## 2021-06-01 PROCEDURE — 96367 TX/PROPH/DG ADDL SEQ IV INF: CPT

## 2021-06-01 RX ORDER — SODIUM CHLORIDE 9 MG/ML
20 INJECTION, SOLUTION INTRAVENOUS ONCE
Status: CANCELLED | OUTPATIENT
Start: 2021-06-29

## 2021-06-01 RX ORDER — ACETAMINOPHEN 325 MG/1
650 TABLET ORAL ONCE
Status: COMPLETED | OUTPATIENT
Start: 2021-06-01 | End: 2021-06-01

## 2021-06-01 RX ORDER — ACETAMINOPHEN 325 MG/1
650 TABLET ORAL ONCE
Status: CANCELLED | OUTPATIENT
Start: 2021-06-29

## 2021-06-01 RX ORDER — METHYLPREDNISOLONE SODIUM SUCCINATE 125 MG/2ML
INJECTION, POWDER, LYOPHILIZED, FOR SOLUTION INTRAMUSCULAR; INTRAVENOUS
Status: DISCONTINUED
Start: 2021-06-01 | End: 2021-06-01 | Stop reason: WASHOUT

## 2021-06-01 RX ORDER — SODIUM CHLORIDE 9 MG/ML
20 INJECTION, SOLUTION INTRAVENOUS ONCE
Status: COMPLETED | OUTPATIENT
Start: 2021-06-01 | End: 2021-06-01

## 2021-06-01 RX ADMIN — Medication 22.5 G: at 08:16

## 2021-06-01 RX ADMIN — SODIUM CHLORIDE 20 ML/HR: 0.9 INJECTION, SOLUTION INTRAVENOUS at 07:52

## 2021-06-01 RX ADMIN — HYDROCORTISONE SODIUM SUCCINATE 100 MG: 100 INJECTION, POWDER, FOR SOLUTION INTRAMUSCULAR; INTRAVENOUS at 08:05

## 2021-06-01 RX ADMIN — DIPHENHYDRAMINE HYDROCHLORIDE 12.5 MG: 50 INJECTION, SOLUTION INTRAMUSCULAR; INTRAVENOUS at 08:00

## 2021-06-01 RX ADMIN — ACETAMINOPHEN 650 MG: 325 TABLET, FILM COATED ORAL at 07:53

## 2021-06-01 NOTE — PROGRESS NOTES
Patient tolerated IVIG, port flushed with good blood return and de accessed  Patient declined AVS, aware of next appointment, left infusion center in baseline condition

## 2021-06-03 NOTE — ASSESSMENT & PLAN NOTE
Hypertension, less than optimally controlled  I will restart losartan at 50 mg daily and continue the other medications  Patients father is calling back in wanting to speak with only the Dr he is asking for a call back at [phone]

## 2021-06-08 DIAGNOSIS — G62.9 NEUROPATHY: ICD-10-CM

## 2021-06-08 RX ORDER — GABAPENTIN 600 MG/1
600 TABLET ORAL DAILY
Qty: 90 TABLET | Refills: 6 | Status: SHIPPED | OUTPATIENT
Start: 2021-06-08 | End: 2022-06-27

## 2021-06-08 RX ORDER — SODIUM CHLORIDE 9 MG/ML
20 INJECTION, SOLUTION INTRAVENOUS ONCE
Status: CANCELLED | OUTPATIENT
Start: 2021-06-15

## 2021-06-08 RX ORDER — ACETAMINOPHEN 325 MG/1
650 TABLET ORAL ONCE
Status: CANCELLED | OUTPATIENT
Start: 2021-06-15

## 2021-06-14 ENCOUNTER — TELEPHONE (OUTPATIENT)
Dept: HEMATOLOGY ONCOLOGY | Facility: CLINIC | Age: 66
End: 2021-06-14

## 2021-06-14 NOTE — TELEPHONE ENCOUNTER
Returned telephone call  Pt states he is shopping for new life insurance as his current company dropped him  Pt has CLL which makes it stage 4  He was on venetaclax prior to his current [place]     pt stated that was the medication he was trying to remember  He will call back with any other questions

## 2021-06-14 NOTE — TELEPHONE ENCOUNTER
Pt calling to confirm what stage of cancer he is in and  what was the infusion drug medication he was taking prior to current infusion medication he's taking now, best call back # [phone]

## 2021-06-15 ENCOUNTER — HOSPITAL ENCOUNTER (OUTPATIENT)
Dept: INFUSION CENTER | Facility: CLINIC | Age: 66
Discharge: HOME | End: 2021-06-15
Payer: MEDICARE

## 2021-06-15 VITALS
RESPIRATION RATE: 18 BRPM | BODY MASS INDEX: 31.87 KG/M2 | TEMPERATURE: 97.7 F | WEIGHT: 235.01 LBS | SYSTOLIC BLOOD PRESSURE: 129 MMHG | HEART RATE: 88 BPM | DIASTOLIC BLOOD PRESSURE: 74 MMHG

## 2021-06-15 DIAGNOSIS — D80.1 HYPOGAMMAGLOBULINEMIA, ACQUIRED (HCC): ICD-10-CM

## 2021-06-15 DIAGNOSIS — C91.10 CLL (CHRONIC LYMPHOCYTIC LEUKEMIA) (HCC): Primary | ICD-10-CM

## 2021-06-15 DIAGNOSIS — D59.10 AUTOIMMUNE HEMOLYTIC ANEMIA (HCC): ICD-10-CM

## 2021-06-15 LAB
ALBUMIN SERPL BCP-MCNC: 3.7 G/DL (ref 3.5–5.7)
ALP SERPL-CCNC: 78 U/L (ref 46–116)
ALT SERPL W P-5'-P-CCNC: 38 U/L (ref 12–78)
ANION GAP SERPL CALCULATED.3IONS-SCNC: 7 MMOL/L (ref 4–13)
AST SERPL W P-5'-P-CCNC: 34 U/L (ref 5–45)
BASOPHILS # BLD MANUAL: 0 THOUSAND/UL (ref 0–0.1)
BASOPHILS NFR MAR MANUAL: 0 % (ref 0–1)
BILIRUB SERPL-MCNC: 0.6 MG/DL (ref 0.2–1)
BUN SERPL-MCNC: 20 MG/DL (ref 5–25)
CALCIUM SERPL-MCNC: 9.2 MG/DL (ref 8.3–10.1)
CHLORIDE SERPL-SCNC: 105 MMOL/L (ref 98–108)
CO2 SERPL-SCNC: 28 MMOL/L (ref 21–32)
CREAT SERPL-MCNC: 1.2 MG/DL (ref 0.6–1.3)
EOSINOPHIL # BLD MANUAL: 0.54 THOUSAND/UL (ref 0–0.4)
EOSINOPHIL NFR BLD MANUAL: 10 % (ref 0–6)
ERYTHROCYTE [DISTWIDTH] IN BLOOD BY AUTOMATED COUNT: 13.4 % (ref 11.6–15.1)
GFR SERPL CREATININE-BSD FRML MDRD: 63 ML/MIN/1.73SQ M
GLUCOSE SERPL-MCNC: 273 MG/DL (ref 65–140)
HCT VFR BLD AUTO: 42.7 % (ref 36.5–49.3)
HGB BLD-MCNC: 14.4 G/DL (ref 12–17)
HGB RETIC QN AUTO: 35.1 PG (ref 30–38.3)
IGG SERPL-MCNC: 554 MG/DL (ref 700–1600)
IMM RETICS NFR: 21.1 % (ref 0–14)
LYMPHOCYTES # BLD AUTO: 0.7 THOUSAND/UL (ref 0.6–4.47)
LYMPHOCYTES # BLD AUTO: 13 % (ref 14–44)
MCH RBC QN AUTO: 30.5 PG (ref 26.8–34.3)
MCHC RBC AUTO-ENTMCNC: 33.7 G/DL (ref 31.4–37.4)
MCV RBC AUTO: 91 FL (ref 82–98)
MONOCYTES # BLD AUTO: 0.54 THOUSAND/UL (ref 0–1.22)
MONOCYTES NFR BLD: 10 % (ref 4–12)
NEUTROPHILS # BLD MANUAL: 3.59 THOUSAND/UL (ref 1.85–7.62)
NEUTS SEG NFR BLD AUTO: 67 % (ref 43–75)
PLATELET # BLD AUTO: 153 THOUSANDS/UL (ref 149–390)
PLATELET BLD QL SMEAR: ADEQUATE
PMV BLD AUTO: 11.5 FL (ref 8.9–12.7)
POTASSIUM SERPL-SCNC: 3.9 MMOL/L (ref 3.5–5.3)
PROT SERPL-MCNC: 6.1 G/DL (ref 6.4–8.2)
RBC # BLD AUTO: 4.72 MILLION/UL (ref 3.88–5.62)
RBC MORPH BLD: NORMAL
RETICS # AUTO: ABNORMAL 10*3/UL (ref 14356–105094)
RETICS # CALC: 2.46 % (ref 0.37–1.87)
SODIUM SERPL-SCNC: 140 MMOL/L (ref 136–145)
TOTAL CELLS COUNTED SPEC: 100
WBC # BLD AUTO: 5.36 THOUSAND/UL (ref 4.31–10.16)

## 2021-06-15 PROCEDURE — 85007 BL SMEAR W/DIFF WBC COUNT: CPT | Performed by: INTERNAL MEDICINE

## 2021-06-15 PROCEDURE — 82784 ASSAY IGA/IGD/IGG/IGM EACH: CPT | Performed by: INTERNAL MEDICINE

## 2021-06-15 PROCEDURE — 85046 RETICYTE/HGB CONCENTRATE: CPT | Performed by: INTERNAL MEDICINE

## 2021-06-15 PROCEDURE — 96367 TX/PROPH/DG ADDL SEQ IV INF: CPT

## 2021-06-15 PROCEDURE — 96415 CHEMO IV INFUSION ADDL HR: CPT

## 2021-06-15 PROCEDURE — 96413 CHEMO IV INFUSION 1 HR: CPT

## 2021-06-15 PROCEDURE — 85027 COMPLETE CBC AUTOMATED: CPT | Performed by: INTERNAL MEDICINE

## 2021-06-15 PROCEDURE — 80053 COMPREHEN METABOLIC PANEL: CPT | Performed by: INTERNAL MEDICINE

## 2021-06-15 RX ORDER — ACETAMINOPHEN 325 MG/1
650 TABLET ORAL ONCE
Status: COMPLETED | OUTPATIENT
Start: 2021-06-15 | End: 2021-06-15

## 2021-06-15 RX ORDER — SODIUM CHLORIDE 9 MG/ML
20 INJECTION, SOLUTION INTRAVENOUS ONCE
Status: COMPLETED | OUTPATIENT
Start: 2021-06-15 | End: 2021-06-15

## 2021-06-15 RX ADMIN — ACETAMINOPHEN 650 MG: 325 TABLET ORAL at 08:55

## 2021-06-15 RX ADMIN — DIPHENHYDRAMINE HYDROCHLORIDE 25 MG: 50 INJECTION, SOLUTION INTRAMUSCULAR; INTRAVENOUS at 09:15

## 2021-06-15 RX ADMIN — SODIUM CHLORIDE 20 ML/HR: 0.9 INJECTION, SOLUTION INTRAVENOUS at 08:20

## 2021-06-15 RX ADMIN — DEXAMETHASONE SODIUM PHOSPHATE 20 MG: 10 INJECTION, SOLUTION INTRAMUSCULAR; INTRAVENOUS at 08:50

## 2021-06-15 RX ADMIN — OBINUTUZUMAB 1000 MG: 1000 INJECTION, SOLUTION, CONCENTRATE INTRAVENOUS at 09:46

## 2021-06-15 NOTE — PLAN OF CARE
Problem: Potential for Falls  Goal: Patient will remain free of falls  Description: INTERVENTIONS:  - Assess patient frequently for physical needs  -  Identify cognitive and physical deficits and behaviors that affect risk of falls  -  [place] fall precautions as indicated by assessment   - Educate patient/family on patient safety including physical limitations  - Instruct patient to call for assistance with activity based on assessment  - Modify environment to reduce risk of injury  - Consider OT/PT consult to assist with strengthening/mobility  Outcome: Progressing     Problem: Knowledge Deficit  Goal: Patient/family/caregiver demonstrates understanding of disease process, treatment plan, medications, and discharge instructions  Description: Complete learning assessment and assess knowledge base    Interventions:  - Provide teaching at level of understanding  - Provide teaching via preferred learning methods  Outcome: Progressing

## 2021-06-15 NOTE — PROGRESS NOTES
Pt  Denied new symptoms or concerns today  Labs obtained and reviewed  Pt  Tolerated  Gazyva without adverse event  Future appointments reviewed  AVS declined

## 2021-06-29 ENCOUNTER — HOSPITAL ENCOUNTER (OUTPATIENT)
Dept: INFUSION CENTER | Facility: CLINIC | Age: 66
Discharge: HOME | End: 2021-06-29
Payer: MEDICARE

## 2021-06-29 VITALS
RESPIRATION RATE: 18 BRPM | TEMPERATURE: 98.9 F | DIASTOLIC BLOOD PRESSURE: 79 MMHG | SYSTOLIC BLOOD PRESSURE: 158 MMHG | HEART RATE: 89 BPM

## 2021-06-29 DIAGNOSIS — D80.1 HYPOGAMMAGLOBULINEMIA, ACQUIRED (HCC): ICD-10-CM

## 2021-06-29 DIAGNOSIS — C91.10 CLL (CHRONIC LYMPHOCYTIC LEUKEMIA) (HCC): Primary | ICD-10-CM

## 2021-06-29 PROCEDURE — 96366 THER/PROPH/DIAG IV INF ADDON: CPT

## 2021-06-29 PROCEDURE — 96375 TX/PRO/DX INJ NEW DRUG ADDON: CPT

## 2021-06-29 PROCEDURE — 96365 THER/PROPH/DIAG IV INF INIT: CPT

## 2021-06-29 PROCEDURE — 96367 TX/PROPH/DG ADDL SEQ IV INF: CPT

## 2021-06-29 RX ORDER — SODIUM CHLORIDE 9 MG/ML
20 INJECTION, SOLUTION INTRAVENOUS ONCE
Status: COMPLETED | OUTPATIENT
Start: 2021-06-29 | End: 2021-06-29

## 2021-06-29 RX ORDER — SODIUM CHLORIDE 9 MG/ML
20 INJECTION, SOLUTION INTRAVENOUS ONCE
Status: CANCELLED | OUTPATIENT
Start: 2021-07-27

## 2021-06-29 RX ORDER — ACETAMINOPHEN 325 MG/1
650 TABLET ORAL ONCE
Status: COMPLETED | OUTPATIENT
Start: 2021-06-29 | End: 2021-06-29

## 2021-06-29 RX ORDER — ACETAMINOPHEN 325 MG/1
650 TABLET ORAL ONCE
Status: CANCELLED | OUTPATIENT
Start: 2021-07-27

## 2021-06-29 RX ADMIN — DIPHENHYDRAMINE HYDROCHLORIDE 12.5 MG: 50 INJECTION, SOLUTION INTRAMUSCULAR; INTRAVENOUS at 08:27

## 2021-06-29 RX ADMIN — SODIUM CHLORIDE 20 ML/HR: 0.9 INJECTION, SOLUTION INTRAVENOUS at 08:19

## 2021-06-29 RX ADMIN — Medication 22.5 G: at 08:47

## 2021-06-29 RX ADMIN — HYDROCORTISONE SODIUM SUCCINATE 100 MG: 100 INJECTION, POWDER, FOR SOLUTION INTRAMUSCULAR; INTRAVENOUS at 08:23

## 2021-06-29 RX ADMIN — ACETAMINOPHEN 650 MG: 325 TABLET, FILM COATED ORAL at 08:21

## 2021-06-29 NOTE — PROGRESS NOTES
Patient tolerated IVIG infusion well  Offers no complaints  Pt is aware of all future appointments   Declined AVS

## 2021-07-09 RX ORDER — ACETAMINOPHEN 325 MG/1
650 TABLET ORAL ONCE
Status: CANCELLED | OUTPATIENT
Start: 2021-07-13

## 2021-07-09 RX ORDER — SODIUM CHLORIDE 9 MG/ML
20 INJECTION, SOLUTION INTRAVENOUS ONCE
Status: CANCELLED | OUTPATIENT
Start: 2021-07-13

## 2021-07-13 ENCOUNTER — HOSPITAL ENCOUNTER (OUTPATIENT)
Dept: INFUSION CENTER | Facility: CLINIC | Age: 66
Discharge: HOME | End: 2021-07-13
Payer: MEDICARE

## 2021-07-13 VITALS
BODY MASS INDEX: 31.57 KG/M2 | SYSTOLIC BLOOD PRESSURE: 135 MMHG | RESPIRATION RATE: 18 BRPM | DIASTOLIC BLOOD PRESSURE: 87 MMHG | WEIGHT: 232.81 LBS | TEMPERATURE: 97.8 F

## 2021-07-13 DIAGNOSIS — D59.10 AUTOIMMUNE HEMOLYTIC ANEMIA (HCC): ICD-10-CM

## 2021-07-13 DIAGNOSIS — D80.1 HYPOGAMMAGLOBULINEMIA, ACQUIRED (HCC): ICD-10-CM

## 2021-07-13 DIAGNOSIS — C91.10 CLL (CHRONIC LYMPHOCYTIC LEUKEMIA) (HCC): Primary | ICD-10-CM

## 2021-07-13 LAB
ALBUMIN SERPL BCP-MCNC: 3.9 G/DL (ref 3.5–5.7)
ALP SERPL-CCNC: 58 U/L (ref 46–116)
ALT SERPL W P-5'-P-CCNC: 41 U/L (ref 12–78)
ANION GAP SERPL CALCULATED.3IONS-SCNC: 0 MMOL/L (ref 4–13)
AST SERPL W P-5'-P-CCNC: 38 U/L (ref 5–45)
BASOPHILS # BLD MANUAL: 0 THOUSAND/UL (ref 0–0.1)
BASOPHILS NFR MAR MANUAL: 0 % (ref 0–1)
BILIRUB SERPL-MCNC: 0.8 MG/DL (ref 0.2–1)
BUN SERPL-MCNC: 19 MG/DL (ref 5–25)
CALCIUM SERPL-MCNC: 9.4 MG/DL (ref 8.3–10.1)
CHLORIDE SERPL-SCNC: 112 MMOL/L (ref 98–108)
CO2 SERPL-SCNC: 30 MMOL/L (ref 21–32)
CREAT SERPL-MCNC: 1.4 MG/DL (ref 0.6–1.3)
EOSINOPHIL # BLD MANUAL: 0.15 THOUSAND/UL (ref 0–0.4)
EOSINOPHIL NFR BLD MANUAL: 3 % (ref 0–6)
ERYTHROCYTE [DISTWIDTH] IN BLOOD BY AUTOMATED COUNT: 14 % (ref 11.6–15.1)
GFR SERPL CREATININE-BSD FRML MDRD: 52 ML/MIN/1.73SQ M
GLUCOSE SERPL-MCNC: 153 MG/DL (ref 65–140)
HCT VFR BLD AUTO: 44.7 % (ref 36.5–49.3)
HGB BLD-MCNC: 15.1 G/DL (ref 12–17)
HGB RETIC QN AUTO: 35.3 PG (ref 30–38.3)
IGG SERPL-MCNC: 551 MG/DL (ref 700–1600)
IMM RETICS NFR: 15.5 % (ref 0–14)
LYMPHOCYTES # BLD AUTO: 0.64 THOUSAND/UL (ref 0.6–4.47)
LYMPHOCYTES # BLD AUTO: 13 % (ref 14–44)
MCH RBC QN AUTO: 30.8 PG (ref 26.8–34.3)
MCHC RBC AUTO-ENTMCNC: 33.8 G/DL (ref 31.4–37.4)
MCV RBC AUTO: 91 FL (ref 82–98)
MONOCYTES # BLD AUTO: 0.3 THOUSAND/UL (ref 0–1.22)
MONOCYTES NFR BLD: 6 % (ref 4–12)
NEUTROPHILS # BLD MANUAL: 3.84 THOUSAND/UL (ref 1.85–7.62)
NEUTS BAND NFR BLD MANUAL: 1 % (ref 0–8)
NEUTS SEG NFR BLD AUTO: 77 % (ref 43–75)
PLATELET # BLD AUTO: 135 THOUSANDS/UL (ref 149–390)
PLATELET BLD QL SMEAR: ABNORMAL
PMV BLD AUTO: 11 FL (ref 8.9–12.7)
POTASSIUM SERPL-SCNC: 4 MMOL/L (ref 3.5–5.3)
PROT SERPL-MCNC: 6.4 G/DL (ref 6.4–8.2)
RBC # BLD AUTO: 4.91 MILLION/UL (ref 3.88–5.62)
RBC MORPH BLD: NORMAL
RETICS # AUTO: ABNORMAL 10*3/UL (ref 14356–105094)
RETICS # CALC: 2.38 % (ref 0.37–1.87)
SODIUM SERPL-SCNC: 142 MMOL/L (ref 136–145)
TOTAL CELLS COUNTED SPEC: 100
WBC # BLD AUTO: 4.92 THOUSAND/UL (ref 4.31–10.16)

## 2021-07-13 PROCEDURE — 85027 COMPLETE CBC AUTOMATED: CPT | Performed by: INTERNAL MEDICINE

## 2021-07-13 PROCEDURE — 80053 COMPREHEN METABOLIC PANEL: CPT | Performed by: INTERNAL MEDICINE

## 2021-07-13 PROCEDURE — 82784 ASSAY IGA/IGD/IGG/IGM EACH: CPT | Performed by: INTERNAL MEDICINE

## 2021-07-13 PROCEDURE — 96415 CHEMO IV INFUSION ADDL HR: CPT

## 2021-07-13 PROCEDURE — 85007 BL SMEAR W/DIFF WBC COUNT: CPT | Performed by: INTERNAL MEDICINE

## 2021-07-13 PROCEDURE — 85046 RETICYTE/HGB CONCENTRATE: CPT | Performed by: INTERNAL MEDICINE

## 2021-07-13 PROCEDURE — 96367 TX/PROPH/DG ADDL SEQ IV INF: CPT

## 2021-07-13 PROCEDURE — 96413 CHEMO IV INFUSION 1 HR: CPT

## 2021-07-13 RX ORDER — ACETAMINOPHEN 325 MG/1
650 TABLET ORAL ONCE
Status: COMPLETED | OUTPATIENT
Start: 2021-07-13 | End: 2021-07-13

## 2021-07-13 RX ORDER — SODIUM CHLORIDE 9 MG/ML
20 INJECTION, SOLUTION INTRAVENOUS ONCE
Status: COMPLETED | OUTPATIENT
Start: 2021-07-13 | End: 2021-07-13

## 2021-07-13 RX ADMIN — SODIUM CHLORIDE 20 ML/HR: 0.9 INJECTION, SOLUTION INTRAVENOUS at 08:29

## 2021-07-13 RX ADMIN — DIPHENHYDRAMINE HYDROCHLORIDE 25 MG: 50 INJECTION, SOLUTION INTRAMUSCULAR; INTRAVENOUS at 08:45

## 2021-07-13 RX ADMIN — ACETAMINOPHEN 650 MG: 325 TABLET, FILM COATED ORAL at 08:26

## 2021-07-13 RX ADMIN — DEXAMETHASONE SODIUM PHOSPHATE 20 MG: 10 INJECTION, SOLUTION INTRAMUSCULAR; INTRAVENOUS at 08:29

## 2021-07-13 RX ADMIN — OBINUTUZUMAB 1000 MG: 1000 INJECTION, SOLUTION, CONCENTRATE INTRAVENOUS at 09:19

## 2021-07-13 NOTE — PROGRESS NOTES
Patient tolerated Gazyva infusion well with no complications  Pt is aware of all future appointments   Declined AVS

## 2021-07-15 ENCOUNTER — DOCUMENTATION (OUTPATIENT)
Dept: ENDOCRINOLOGY | Facility: CLINIC | Age: 66
End: 2021-07-15

## 2021-07-15 RX ORDER — INSULIN DEGLUDEC INJECTION 100 U/ML
17 INJECTION, SOLUTION SUBCUTANEOUS
COMMUNITY
End: 2021-07-29 | Stop reason: SDUPTHER

## 2021-07-21 ENCOUNTER — OFFICE VISIT (OUTPATIENT)
Dept: HEMATOLOGY ONCOLOGY | Facility: CLINIC | Age: 66
End: 2021-07-21
Payer: MEDICARE

## 2021-07-21 VITALS
DIASTOLIC BLOOD PRESSURE: 68 MMHG | HEIGHT: 73 IN | BODY MASS INDEX: 30.88 KG/M2 | TEMPERATURE: 98.5 F | OXYGEN SATURATION: 99 % | WEIGHT: 233 LBS | SYSTOLIC BLOOD PRESSURE: 120 MMHG | RESPIRATION RATE: 16 BRPM | HEART RATE: 73 BPM

## 2021-07-21 DIAGNOSIS — C91.10 CLL (CHRONIC LYMPHOCYTIC LEUKEMIA) (HCC): Primary | ICD-10-CM

## 2021-07-21 PROCEDURE — 99214 OFFICE O/P EST MOD 30 MIN: CPT | Performed by: PHYSICIAN ASSISTANT

## 2021-07-21 RX ORDER — ACETAMINOPHEN 325 MG/1
650 TABLET ORAL ONCE
Status: CANCELLED | OUTPATIENT
Start: 2021-08-10

## 2021-07-21 RX ORDER — SODIUM CHLORIDE 9 MG/ML
20 INJECTION, SOLUTION INTRAVENOUS ONCE
Status: CANCELLED | OUTPATIENT
Start: 2021-08-10

## 2021-07-21 NOTE — PROGRESS NOTES
Hematology/Oncology Outpatient Follow-up  Lorel Severance 76 y o  male 1955 [id number]    Date:  7/21/2021      Assessment and Plan:  1  CLL (chronic lymphocytic leukemia) [facility])  58-year-old male presents for follow-up regarding history of CLL  He is on Imbruvica 140 mg p o  daily  He also receives IVIG every 28 days and Gazavya  Every 28 days  IgG remains acceptable but continued IVIG therapy is recommended  He also continues on prednisone 5 mg p o  daily  DEXA scan is up-to-date  He also follows with endocrinology  Reviewed recommendations for COVID-19 vaccine  Patient still is not vaccinated  Recommend that he be come back stated however is very hesitant due to a previous side effect of a flu shot in the 1970s to being sick for 3 weeks  Reviewed the that he then needs to be careful  He is following precautions  If he is in an area of crowded space he is wearing a mask still  Masking would still be encouraged in his case due to being on vaccinated  Patient's labs, CBC and CMP, have been stable for about 2 years  Patient does not need to have labs immediately prior to his Gazayva infusions as this is not cytotoxic therapy  Parameters have been removed from his treatment plan  He can have labs drawn out of his port for monitoring purposes at the infusion center but can proceed with treatment without the labs being resulted  Follow up in 4 months  HPI:   76-year-old male presents for follow-up regarding a longstanding history of CLL as well as hemolytic anemia related to his CLL      On 3/20/17 patient found to have hemoglobin of 6 2, ,000  He was admitted to [facility] for blood transfusion and plan to transfer to [address] [place] [address]  On 3/20/17 WBC count 195,000, hemoglobin 4 9, platelet count 65  Direct coomb's was positive with undetectable haptoglobin  He was given 2 units of PRBCs, Solu-Medrol 1 g ×3 days and IVIG 1 g/kg daily ×3 days   His hemoglobin continued to drop  Bone marrow biopsy on 3/21 showed marrow cellularity of greater than 95% almost replaced by small lymphocytes, lambda light chain restricted, CD20 positive, CD19 positive, CD23 positive partially expressing CD11c and CD25 and CD5 positive and negative for CD 79 and CD38  He was treated with single dose of chlorambucil to control his CLL   3/22 second dose of chlorambucil, also Rituxan 375 mg/M ² autoimmune hemolytic anemia  WBC continued to rise, 266,000  Patient initiated with Venetoclax 20 mg daily  Tumor lysis monitored every 8 hours; given aggressive hydration and Lasix and remained euvolemic  3/24-WBC dropped to 112,000  3/25- WBC 63,000  3/26-WBC 15,000, , platelet count 67,845  Patient became febrile, 101 3  The cefepime initiated Venetoclax held  3/28-patient fell from bed, CT head negative  ANC 1200, cefepime discontinued and Levaquin 75 mg daily started sliding 3/29 given second dose of rituximab 375 mg/m ²   3/30-WBC meg to 14 5 thousand, platelets 67,018, Venetoclax restarted 10 mg every other day  3/31 WBC 4 4, , platelet count 83,575  4/1-4/4: WBC maintained less than 10,000, ANC and platelet count meg  He was discharged on Venetoclax 10 mg every other day  He was instructed to discontinue simvastatin, Ranexa, aspirin, metoprolol 50 mg q  day, losartan 50 mg q  day, Plavix 75 mg q  day, folic acid 780 µg b i d , prednisone 60 mg, allopurinol  He was advised to continue Levaquin 750 mg daily for 10 days, Lexapro 10 mg daily, fenofibrate 50 mg daily, gabapentin 100 mg twice daily, Protonix 40 mg daily     Imaging studies performed at [facility]:  Patient had echocardiogram while inpatient at [facility] on 3/21  This study was unremarkable  CT chest abdomen pelvis without contrast on 3/24 showed stable pulmonary nodules, patchy groundglass densities of lower lobes previously identified and which may represent volume loss or early infiltrate   Persistent diffuse bronchial wall thickening suggesting acute/chronic bronchitis changes  No bronchiectasis  No masses  Stable lymphadenopathy of mediastinum  Stable axillary lymphadenopathy  Splenomegaly 23 9 cm  Extensive lymphadenopathy throughout the entire retroperitoneal, small bowel mesentery, and pelvis  Largest lymph node in right lower quadrant measured 4 6 x 4 8  Stable enlarged left para-aortic lymph node measuring 6 2 x 6 8   4/12/17: Patient received one dose of Rituxan on 4/7/17  Venetoclax 10 mg every other day 4/5/17 - 4/9/17  Venetoclax 10 mg every day beginning 4/10/17  Monitoring CBC 3 times per week  4/28/17: patient had follow-up appointment at [facility] with Dr Verna Eagle  She recommended to slowly increase dose of veneteclax  Also, she recommended as he has had numerous treatments with Rituxan, if antibody directed therapy becomes indicated in the future recommendation was with [facility]  She will be seeing her on a p r n  Basis      July 2017- Hemolysis  Disease not under control with veneteclax  Started prednisone 60 mg daily, folic acid, IBRUTINIB 257 mg daily and Gazyva       Sept 2017- 9/18-9/20 patient was admitted due to uncontrolled hyperglycemia with new onset DM type II due to chronic prednisone use for CLL/hemolytic anemia; also found to have profound neutropenia  Ibrutinib dose was reduced to 280 mg daily      October 2017- 10/7/17 - 10/14/17 patient was admitted due to cellulitis on his scalp     Dec 2017- Cathlyn Long decreased to 140 mg PO daily due to thrombocytopenia      4/23 - 4/27 patient was admitted due to listeria bacteremia  He was discharged on IV ampicillin  Echo negative for vegetations  Imbruvica and [facility] was on hold at that time  Repeat CBC on 5/14/18 showed normal ANC, and hemoglobin   Platelets adequate at 83K      10/18/18 the patient EGD   This showed distal esophageal stricture   This was dilated to 18 mm using a balloon dilator   He could not dilate any further due to bleeding secondary to thrombocytopenia      Interval history: had constipation with med for cholesterol but now resolved with d/c of med     ROS: Review of Systems   Constitutional: Negative for appetite change, chills, fatigue and fever  Respiratory: Positive for shortness of breath (with a lot of exertion )  Negative for cough  Cardiovascular: Negative for chest pain, palpitations and leg swelling  Gastrointestinal: Negative for abdominal pain, blood in stool, constipation, diarrhea, nausea and vomiting  Genitourinary: Negative for difficulty urinating, dysuria and hematuria  Musculoskeletal: Positive for arthralgias (intermittent )  Skin: Negative  Neurological: Negative for dizziness, weakness, light-headedness, numbness and headaches  Hematological: Negative  Psychiatric/Behavioral: Negative        Past Medical History:   Diagnosis Date    Anemia     Arthritis     CAD (coronary artery disease) 2004    Cellulitis of scalp     CKD (chronic kidney disease) stage 3, GFR 30-59 ml/min ([facility])     CLL (chronic lymphocytic leukemia) (HCC)     COPD (chronic obstructive pulmonary disease) ([address] )     Diabetes mellitus ([address] )     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    Dyslipidemia     Edema extremities     Esophageal ulcer     H/O blood clots     Hemolytic anemia (HCC)     Hiatal hernia     History of TIA (transient ischemic attack)     Hyperlipidemia     Hypertension     Listeria infection 2018    Multiple gastric ulcers     Myocardial infarction ([address] ) 2004    acute    Neutropenia (HCC)     Obese     Recurrent sinusitis     Sleep apnea     Thrombocytopenia (HCC)     Vertigo        Past Surgical History:   Procedure Laterality Date    ARTHROSCOPIC REPAIR ACL      lt knee    CARDIAC SURGERY      cardiac cath with stent    FOOT SURGERY      IR PORT CHECK  5/17/2019    KNEE ARTHROSCOPY      OTHER SURGICAL HISTORY      cardiac cath lesion 1, 1st adjunct treat device: stent    PORTACATH PLACEMENT      SD ESOPHAGOGASTRODUODENOSCOPY TRANSORAL DIAGNOSTIC N/A 10/5/2017    Procedure: ESOPHAGOGASTRODUODENOSCOPY (EGD) with bx;  Surgeon: Samia Huff DO;  [facility]: [facility]; Service: Gastroenterology    SD ESOPHAGOGASTRODUODENOSCOPY TRANSORAL DIAGNOSTIC N/A 3/8/2018    Procedure: ESOPHAGOGASTRODUODENOSCOPY (EGD) with biopsy;  Surgeon: Samia Huff DO;  [facility]; Service: Gastroenterology    SD ESOPHAGOGASTRODUODENOSCOPY TRANSORAL DIAGNOSTIC N/A 2018    Procedure: ESOPHAGOGASTRODUODENOSCOPY (EGD) with Dilation;  Surgeon: Samia Huff DO;  [facility]; Service: Gastroenterology    SD ESOPHAGOGASTRODUODENOSCOPY TRANSORAL DIAGNOSTIC N/A 10/18/2018    Procedure: ESOPHAGOGASTRODUODENOSCOPY (EGD) with dilation;  Surgeon: Uvaldo Sargent MD;  [facility]: [facility]; Service: Gastroenterology    SD ESOPHAGOSCOPY FLEXIBLE TRANSORAL WITH BIOPSY N/A 2016    Procedure: ESOPHAGOGASTRODUODENOSCOPY (EGD); ESOPHAGEAL DILATION; ESOPHAGEAL BIOPSY;  Surgeon: Guilherme Kauffman MD;  Location:  MAIN OR;  Service: Thoracic    TONSILLECTOMY      UPPER GASTROINTESTINAL ENDOSCOPY      x 6       Social History     Socioeconomic History    Marital status: /Civil Union     Spouse name: None    Number of children: None    Years of education: None    Highest education level: None   Occupational History    Occupation: [place] [employer]   Tobacco Use    Smoking status: Former Smoker     Packs/day: 1 00     Years: 10 00     Pack years: 10 00     Types: Cigarettes     Quit date: 2015     Years since quittin 3    Smokeless tobacco: Never Used   Vaping Use    Vaping Use: Never used   Substance and Sexual Activity    Alcohol use:  Yes     Alcohol/week: 2 0 standard drinks     Types: 2 Cans of beer per week     Comment: social use as per [employer]    Drug use: No    Sexual activity: None   Other Topics Concern    None   Social History Narrative    None Social Determinants of Health     Financial Resource Strain:     Difficulty of Paying Living Expenses:    Food Insecurity:     Worried About Running Out of Food in the Last Year:     [address] in the Last Year:    Transportation Needs:     Lack of Transportation (Medical):      Lack of Transportation (Non-Medical):    Physical Activity:     Days of Exercise per Week:     Minutes of Exercise per Session:    Stress:     Feeling of Stress :    Social Connections:     Frequency of Communication with Friends and Family:     Frequency of Social Gatherings with Friends and Family:     Attends [employer] Services:     Active Member of Clubs or Organizations:     Attends Club or Organization Meetings:     Marital Status:    Intimate Partner Violence:     Fear of Current or Ex-Partner:     Emotionally Abused:     Physically Abused:     Sexually Abused:        Family History   Problem Relation Age of Onset    Leukemia Mother         chronic    Colon polyps Mother         sidmoid    Parkinsonism Father        Allergies   Allergen Reactions    Heparin Other (See Comments)     Other reaction(s): Blood Disorders  clot    Macrolides And Ketolides Other (See Comments)     Interacts with other meds he is taking         Current Outpatient Medications:     acyclovir (ZOVIRAX) 400 MG tablet, TAKE 1 TABLET TWICE A DAY, Disp: 60 tablet, Rfl: 11    amLODIPine (NORVASC) 10 mg tablet, Take 1 tablet (10 mg total) by mouth daily, Disp: 90 tablet, Rfl: 3    aspirin 81 MG tablet, Take 81 mg by mouth every other day Every other day , Disp: , Rfl:     cyclobenzaprine (FLEXERIL) 10 mg tablet, Take 1 tablet (10 mg total) by mouth 3 (three) times a day as needed for muscle spasms, Disp: 20 tablet, Rfl: 0    fenofibrate (TRICOR) 145 mg tablet, TAKE 1 TABLET DAILY, Disp: 90 tablet, Rfl: 5    folic acid (FOLVITE) 1 mg tablet, Take 800 mcg by mouth daily , Disp: , Rfl:     furosemide (LASIX) 40 mg tablet, TAKE 1 TABLET DAILY, Disp: 90 tablet, Rfl: 5    gabapentin (NEURONTIN) 600 MG tablet, Take 1 tablet (600 mg total) by mouth daily, Disp: 90 tablet, Rfl: 6    glucose monitoring kit (FREESTYLE) monitoring kit, 1 each by Does not apply route as needed ( ) E 11 9, Disp: 1 each, Rfl: 0    insulin degludec Emily Princcandie FlexTouch) 100 units/mL injection pen, Inject 17 Units under the skin daily at bedtime, Disp: , Rfl:     insulin lispro (HumaLOG) 100 units/mL injection pen, Use 5-10 units before meals as needed for steroid induced hyperglycemia, Disp: 5 pen, Rfl: 3    Insulin Pen Needle (BD Pen Needle Inez U/F) 32G X 4 MM MISC, Use 3 (three) times a day, Disp: 300 each, Rfl: 1    Insulin Syringe-Needle U-100 30G X 1/2" 0 3 ML MISC, by Does not apply route 2 (two) times a day, Disp: 100 each, Rfl: 6    Lancets (FREESTYLE) lancets, Use as instructed--test 2 times a day  E 11 9, Disp: 100 each, Rfl: 0    LORazepam (ATIVAN) 0 5 mg tablet, Take 1 tablet (0 5 mg total) by mouth daily as needed for anxiety, Disp: 30 tablet, Rfl: 0    losartan (COZAAR) 100 MG tablet, TAKE 1 TABLET DAILY, Disp: 90 tablet, Rfl: 5    multivitamin (THERAGRAN) TABS, Take 1 tablet by mouth daily, Disp: , Rfl:     obinutuzumab (GAZYVA) 1000 mg/40 mL SOLN, Infuse into a venous catheter, Disp: , Rfl:     oxyCODONE (ROXICODONE) 10 MG TABS, Take 1 tablet (10 mg total) by mouth every 6 (six) hours as needed for moderate pain For ongoing pain controlMax Daily Amount: 40 mg, Disp: 90 tablet, Rfl: 0    pantoprazole (PROTONIX) 40 mg tablet, Take 1 tablet (40 mg total) by mouth daily, Disp: 90 tablet, Rfl: 2    Potassium (POTASSIMIN PO), Take 20 mEq by mouth daily, Disp: , Rfl:     predniSONE 5 mg tablet, TAKE 1 TABLET DAILY, Disp: 90 tablet, Rfl: 5    sodium chloride, PF, 0 9 %, 10 mL by Intracatheter route see administration instructions 10mL into port before and after ampicillin doses, Disp: , Rfl: 0    Ventolin  (90 Base) MCG/ACT inhaler, USE 2 INHALATIONS FOUR TIMES A DAY AS NEEDED FOR WHEEZING, Disp: 36 g, Rfl: 5    zolpidem (AMBIEN) 5 mg tablet, Take 1 tablet (5 mg total) by mouth daily at bedtime as needed for sleep, Disp: 30 tablet, Rfl: 0    citalopram (CeleXA) 40 mg tablet, Take 1 tablet (40 mg total) by mouth daily for 28 days, Disp: 10 tablet, Rfl: 1    Ibrutinib 140 MG TABS, Take 140 mg by mouth daily, Disp: 30 tablet, Rfl: 11      Physical Exam:  /68 (BP Location: Left arm, Patient Position: Sitting, Cuff Size: Adult)   Pulse 73   Temp 98 5 °F (36 9 °C) (Temporal)   Resp 16   Ht 6' 1" (1 854 m)   Wt 106 kg (233 lb)   SpO2 99%   BMI 30 74 kg/m²     Physical Exam  Vitals reviewed  Constitutional:       General: He is not in acute distress  Appearance: He is well-developed  He is not ill-appearing  HENT:      Head: Normocephalic and atraumatic  Eyes:      General: No scleral icterus  Conjunctiva/sclera: Conjunctivae normal    Cardiovascular:      Rate and Rhythm: Normal rate and regular rhythm  Heart sounds: Normal heart sounds  No murmur heard  Pulmonary:      Effort: Pulmonary effort is normal  No respiratory distress  Breath sounds: Normal breath sounds  Abdominal:      Palpations: Abdomen is soft  Tenderness: There is no abdominal tenderness  Musculoskeletal:         General: No tenderness  Normal range of motion  Cervical back: Normal range of motion and neck supple  Right lower leg: No edema  Left lower leg: No edema  Lymphadenopathy:      Cervical: No cervical adenopathy  Skin:     General: Skin is warm and dry  Neurological:      Mental Status: He is alert and oriented to person, place, and time  Cranial Nerves: No cranial nerve deficit     Psychiatric:         Mood and Affect: Mood normal          Behavior: Behavior normal        Labs:  Lab Results   Component Value Date    WBC 4 92 07/13/2021    HGB 15 1 07/13/2021    HCT 44 7 07/13/2021    MCV 91 07/13/2021  (L) 07/13/2021     Lab Results   Component Value Date     12/29/2015    K 4 0 07/13/2021     (H) 07/13/2021    CO2 30 07/13/2021    ANIONGAP 8 12/29/2015    BUN 19 07/13/2021    CREATININE 1 40 (H) 07/13/2021    GLUCOSE 109 12/29/2015    GLUF 170 (H) 12/01/2020    CALCIUM 9 4 07/13/2021    CORRECTEDCA 10 0 03/23/2021    AST 38 07/13/2021    ALT 41 07/13/2021    ALKPHOS 58 07/13/2021    PROT 5 9 (L) 12/29/2015    BILITOT 0 7 12/29/2015    EGFR 52 07/13/2021       Patient voiced understanding and agreement in the above discussion  Aware to contact our office with questions/symptoms in the interim  This note has been generated by voice recognition software system  Therefore, there may be spelling, grammar, and or syntax errors  Please contact if questions arise

## 2021-07-27 ENCOUNTER — HOSPITAL ENCOUNTER (OUTPATIENT)
Dept: INFUSION CENTER | Facility: CLINIC | Age: 66
Discharge: HOME | End: 2021-07-27
Payer: MEDICARE

## 2021-07-27 VITALS
WEIGHT: 233.69 LBS | RESPIRATION RATE: 18 BRPM | BODY MASS INDEX: 30.97 KG/M2 | TEMPERATURE: 98.5 F | SYSTOLIC BLOOD PRESSURE: 158 MMHG | HEART RATE: 72 BPM | HEIGHT: 73 IN | DIASTOLIC BLOOD PRESSURE: 83 MMHG

## 2021-07-27 DIAGNOSIS — D80.1 HYPOGAMMAGLOBULINEMIA, ACQUIRED (HCC): ICD-10-CM

## 2021-07-27 DIAGNOSIS — D59.10 AUTOIMMUNE HEMOLYTIC ANEMIA (HCC): ICD-10-CM

## 2021-07-27 DIAGNOSIS — C91.10 CLL (CHRONIC LYMPHOCYTIC LEUKEMIA) (HCC): Primary | ICD-10-CM

## 2021-07-27 PROCEDURE — 96375 TX/PRO/DX INJ NEW DRUG ADDON: CPT

## 2021-07-27 PROCEDURE — 96365 THER/PROPH/DIAG IV INF INIT: CPT

## 2021-07-27 PROCEDURE — 96366 THER/PROPH/DIAG IV INF ADDON: CPT

## 2021-07-27 PROCEDURE — 96367 TX/PROPH/DG ADDL SEQ IV INF: CPT

## 2021-07-27 RX ORDER — ACETAMINOPHEN 325 MG/1
650 TABLET ORAL ONCE
Status: COMPLETED | OUTPATIENT
Start: 2021-07-27 | End: 2021-07-27

## 2021-07-27 RX ORDER — ACETAMINOPHEN 325 MG/1
650 TABLET ORAL ONCE
Status: CANCELLED | OUTPATIENT
Start: 2021-08-24

## 2021-07-27 RX ORDER — SODIUM CHLORIDE 9 MG/ML
20 INJECTION, SOLUTION INTRAVENOUS ONCE
Status: CANCELLED | OUTPATIENT
Start: 2021-08-24

## 2021-07-27 RX ORDER — SODIUM CHLORIDE 9 MG/ML
20 INJECTION, SOLUTION INTRAVENOUS ONCE
Status: COMPLETED | OUTPATIENT
Start: 2021-07-27 | End: 2021-07-27

## 2021-07-27 RX ADMIN — Medication 20 G: at 09:00

## 2021-07-27 RX ADMIN — SODIUM CHLORIDE 20 ML/HR: 0.9 INJECTION, SOLUTION INTRAVENOUS at 08:36

## 2021-07-27 RX ADMIN — DIPHENHYDRAMINE HYDROCHLORIDE 12.5 MG: 50 INJECTION, SOLUTION INTRAMUSCULAR; INTRAVENOUS at 08:36

## 2021-07-27 RX ADMIN — ACETAMINOPHEN 650 MG: 325 TABLET, FILM COATED ORAL at 08:40

## 2021-07-27 RX ADMIN — HYDROCORTISONE SODIUM SUCCINATE 100 MG: 100 INJECTION, POWDER, FOR SOLUTION INTRAMUSCULAR; INTRAVENOUS at 08:40

## 2021-07-27 NOTE — PROGRESS NOTES
Pt  Denied new symptoms or concerns today  IVIG tolerated without adverse event  Future appointments reviewed  Declined AVS   Pt  Is aware that we will not have an on site lab moving forward  There is a note in patient orders that we do not have to wait for lab results with [facility]

## 2021-07-29 DIAGNOSIS — Z79.4 TYPE 2 DIABETES MELLITUS WITHOUT COMPLICATION, WITH LONG-TERM CURRENT USE OF INSULIN (HCC): Primary | ICD-10-CM

## 2021-07-29 DIAGNOSIS — E11.9 TYPE 2 DIABETES MELLITUS WITHOUT COMPLICATION, WITH LONG-TERM CURRENT USE OF INSULIN (HCC): Primary | ICD-10-CM

## 2021-07-29 RX ORDER — INSULIN DEGLUDEC INJECTION 100 U/ML
17 INJECTION, SOLUTION SUBCUTANEOUS
Qty: 15 ML | Refills: 1 | Status: SHIPPED | OUTPATIENT
Start: 2021-07-29 | End: 2021-12-17 | Stop reason: ALTCHOICE

## 2021-07-29 NOTE — TELEPHONE ENCOUNTER
----- Message from Kaitlin Lucero sent at 7/29/2021 10:47 AM EDT -----  Regarding: RE: Non-Urgent Medical Question  Contact: [phone]    I can use a refell of tresiba i i am on my last pen now   please send to [facility]  in [place]  Thank you  ----- Message -----  From: Brittany Martinez  Sent: 7/15/21 8:55 AM  To: Radha Trotter  Subject: RE: Non-Urgent Medical Question    Dr Betsy Jackson said you can continue on your current doses of insulin  Did you need any refills?      ----- Message -----       From:Cayetano Lucero       Sent:7/14/2021  7:24 PM EDT         To:Halie No MD    Subject:RE: Non-Urgent Medical Question    17units and 5 humalog  at [facility] lunch  and dinner (5 humalog  )      ----- Message -----       Nimesh Salvador MD       Sent:7/14/2021  9:13 AM EDT         To:Cayetano Lucero    Subject:RE: Non-Urgent Medical Question    Most numbers between 150-200s   Are you taking tresiba 16 units daily and humalog only the day of chemo or are you taking any humalog other days as well       ----- Message -----       From:Cayetano Lucero       Sent:7/12/2021  6:11 PM EDT         To:Halie No MD    Subject:Non-Urgent Medical Question    Blood test

## 2021-08-10 ENCOUNTER — HOSPITAL ENCOUNTER (OUTPATIENT)
Dept: INFUSION CENTER | Facility: CLINIC | Age: 66
Discharge: HOME | End: 2021-08-10
Payer: MEDICARE

## 2021-08-10 VITALS
RESPIRATION RATE: 18 BRPM | DIASTOLIC BLOOD PRESSURE: 94 MMHG | SYSTOLIC BLOOD PRESSURE: 185 MMHG | HEART RATE: 78 BPM | TEMPERATURE: 98.8 F

## 2021-08-10 DIAGNOSIS — D80.1 HYPOGAMMAGLOBULINEMIA, ACQUIRED (HCC): ICD-10-CM

## 2021-08-10 DIAGNOSIS — D59.10 AUTOIMMUNE HEMOLYTIC ANEMIA (HCC): ICD-10-CM

## 2021-08-10 DIAGNOSIS — C91.10 CLL (CHRONIC LYMPHOCYTIC LEUKEMIA) (HCC): Primary | ICD-10-CM

## 2021-08-10 LAB
ALBUMIN SERPL BCP-MCNC: 3.9 G/DL (ref 3.5–5)
ALP SERPL-CCNC: 56 U/L (ref 46–116)
ALT SERPL W P-5'-P-CCNC: 49 U/L (ref 12–78)
ANION GAP SERPL CALCULATED.3IONS-SCNC: 10 MMOL/L (ref 4–13)
AST SERPL W P-5'-P-CCNC: 28 U/L (ref 5–45)
BASOPHILS # BLD MANUAL: 0 THOUSAND/UL (ref 0–0.1)
BASOPHILS NFR MAR MANUAL: 0 % (ref 0–1)
BILIRUB SERPL-MCNC: 0.48 MG/DL (ref 0.2–1)
BUN SERPL-MCNC: 19 MG/DL (ref 5–25)
CALCIUM SERPL-MCNC: 8.9 MG/DL (ref 8.3–10.1)
CHLORIDE SERPL-SCNC: 108 MMOL/L (ref 100–108)
CO2 SERPL-SCNC: 25 MMOL/L (ref 21–32)
CREAT SERPL-MCNC: 1.18 MG/DL (ref 0.6–1.3)
EOSINOPHIL # BLD MANUAL: 0.09 THOUSAND/UL (ref 0–0.4)
EOSINOPHIL NFR BLD MANUAL: 2 % (ref 0–6)
ERYTHROCYTE [DISTWIDTH] IN BLOOD BY AUTOMATED COUNT: 13.7 % (ref 11.6–15.1)
GFR SERPL CREATININE-BSD FRML MDRD: 64 ML/MIN/1.73SQ M
GLUCOSE SERPL-MCNC: 182 MG/DL (ref 65–140)
HCT VFR BLD AUTO: 42.8 % (ref 36.5–49.3)
HGB BLD-MCNC: 13.9 G/DL (ref 12–17)
HGB RETIC QN AUTO: 34.1 PG (ref 30–38.3)
IGG SERPL-MCNC: 529 MG/DL (ref 700–1600)
IMM RETICS NFR: 7.5 % (ref 0–14)
LYMPHOCYTES # BLD AUTO: 0.7 THOUSAND/UL (ref 0.6–4.47)
LYMPHOCYTES # BLD AUTO: 15 % (ref 14–44)
MCH RBC QN AUTO: 30.4 PG (ref 26.8–34.3)
MCHC RBC AUTO-ENTMCNC: 32.5 G/DL (ref 31.4–37.4)
MCV RBC AUTO: 94 FL (ref 82–98)
MONOCYTES # BLD AUTO: 0.42 THOUSAND/UL (ref 0–1.22)
MONOCYTES NFR BLD: 9 % (ref 4–12)
NEUTROPHILS # BLD MANUAL: 3.26 THOUSAND/UL (ref 1.85–7.62)
NEUTS SEG NFR BLD AUTO: 70 % (ref 43–75)
NRBC BLD AUTO-RTO: 0 /100 WBCS
PLATELET # BLD AUTO: 159 THOUSANDS/UL (ref 149–390)
PLATELET BLD QL SMEAR: ADEQUATE
PMV BLD AUTO: 11.5 FL (ref 8.9–12.7)
POTASSIUM SERPL-SCNC: 4 MMOL/L (ref 3.5–5.3)
PROT SERPL-MCNC: 6.6 G/DL (ref 6.4–8.2)
RBC # BLD AUTO: 4.57 MILLION/UL (ref 3.88–5.62)
RBC MORPH BLD: NORMAL
RETICS # AUTO: ABNORMAL 10*3/UL (ref 14356–105094)
RETICS # CALC: 2.08 % (ref 0.37–1.87)
SODIUM SERPL-SCNC: 143 MMOL/L (ref 136–145)
TOTAL CELLS COUNTED SPEC: 100
VARIANT LYMPHS # BLD AUTO: 4 %
WBC # BLD AUTO: 4.65 THOUSAND/UL (ref 4.31–10.16)

## 2021-08-10 PROCEDURE — 96367 TX/PROPH/DG ADDL SEQ IV INF: CPT

## 2021-08-10 PROCEDURE — 82784 ASSAY IGA/IGD/IGG/IGM EACH: CPT | Performed by: INTERNAL MEDICINE

## 2021-08-10 PROCEDURE — 96413 CHEMO IV INFUSION 1 HR: CPT

## 2021-08-10 PROCEDURE — 85007 BL SMEAR W/DIFF WBC COUNT: CPT | Performed by: INTERNAL MEDICINE

## 2021-08-10 PROCEDURE — 85046 RETICYTE/HGB CONCENTRATE: CPT | Performed by: INTERNAL MEDICINE

## 2021-08-10 PROCEDURE — 96415 CHEMO IV INFUSION ADDL HR: CPT

## 2021-08-10 PROCEDURE — 85027 COMPLETE CBC AUTOMATED: CPT | Performed by: INTERNAL MEDICINE

## 2021-08-10 PROCEDURE — 80053 COMPREHEN METABOLIC PANEL: CPT | Performed by: INTERNAL MEDICINE

## 2021-08-10 RX ORDER — ACETAMINOPHEN 325 MG/1
650 TABLET ORAL ONCE
Status: COMPLETED | OUTPATIENT
Start: 2021-08-10 | End: 2021-08-10

## 2021-08-10 RX ORDER — SODIUM CHLORIDE 9 MG/ML
20 INJECTION, SOLUTION INTRAVENOUS ONCE
Status: COMPLETED | OUTPATIENT
Start: 2021-08-10 | End: 2021-08-10

## 2021-08-10 RX ADMIN — SODIUM CHLORIDE 20 ML/HR: 0.9 INJECTION, SOLUTION INTRAVENOUS at 08:26

## 2021-08-10 RX ADMIN — OBINUTUZUMAB 1000 MG: 1000 INJECTION, SOLUTION, CONCENTRATE INTRAVENOUS at 09:37

## 2021-08-10 RX ADMIN — ACETAMINOPHEN 650 MG: 325 TABLET, FILM COATED ORAL at 08:24

## 2021-08-10 RX ADMIN — DEXAMETHASONE SODIUM PHOSPHATE 20 MG: 10 INJECTION, SOLUTION INTRAMUSCULAR; INTRAVENOUS at 08:26

## 2021-08-10 RX ADMIN — DIPHENHYDRAMINE HYDROCHLORIDE 25 MG: 50 INJECTION, SOLUTION INTRAMUSCULAR; INTRAVENOUS at 08:50

## 2021-08-10 NOTE — PROGRESS NOTES
Pt  Denied new symptoms or concerns today  Labs obtained, no need to wait for results per MD orders  Pt  Anh Pinto without adverse event  Future appointments reviewed  AVS declined

## 2021-08-21 DIAGNOSIS — C91.10 CLL (CHRONIC LYMPHOCYTIC LEUKEMIA) (HCC): ICD-10-CM

## 2021-08-21 DIAGNOSIS — D80.1 HYPOGAMMAGLOBULINEMIA, ACQUIRED (HCC): Primary | ICD-10-CM

## 2021-08-24 ENCOUNTER — HOSPITAL ENCOUNTER (OUTPATIENT)
Dept: INFUSION CENTER | Facility: CLINIC | Age: 66
Discharge: HOME | End: 2021-08-24
Payer: MEDICARE

## 2021-08-24 VITALS
TEMPERATURE: 98.2 F | HEIGHT: 73 IN | WEIGHT: 230.38 LBS | BODY MASS INDEX: 30.53 KG/M2 | RESPIRATION RATE: 18 BRPM | DIASTOLIC BLOOD PRESSURE: 79 MMHG | HEART RATE: 72 BPM | SYSTOLIC BLOOD PRESSURE: 162 MMHG

## 2021-08-24 DIAGNOSIS — C91.10 CLL (CHRONIC LYMPHOCYTIC LEUKEMIA) (HCC): Primary | ICD-10-CM

## 2021-08-24 DIAGNOSIS — D80.1 HYPOGAMMAGLOBULINEMIA, ACQUIRED (HCC): ICD-10-CM

## 2021-08-24 PROCEDURE — 96367 TX/PROPH/DG ADDL SEQ IV INF: CPT

## 2021-08-24 PROCEDURE — 96375 TX/PRO/DX INJ NEW DRUG ADDON: CPT

## 2021-08-24 PROCEDURE — 96366 THER/PROPH/DIAG IV INF ADDON: CPT

## 2021-08-24 PROCEDURE — 96365 THER/PROPH/DIAG IV INF INIT: CPT

## 2021-08-24 RX ORDER — ACETAMINOPHEN 325 MG/1
650 TABLET ORAL ONCE
Status: COMPLETED | OUTPATIENT
Start: 2021-08-24 | End: 2021-08-24

## 2021-08-24 RX ORDER — SODIUM CHLORIDE 9 MG/ML
20 INJECTION, SOLUTION INTRAVENOUS ONCE
Status: CANCELLED | OUTPATIENT
Start: 2021-09-21

## 2021-08-24 RX ORDER — SODIUM CHLORIDE 9 MG/ML
20 INJECTION, SOLUTION INTRAVENOUS ONCE
Status: COMPLETED | OUTPATIENT
Start: 2021-08-24 | End: 2021-08-24

## 2021-08-24 RX ORDER — ACETAMINOPHEN 325 MG/1
650 TABLET ORAL ONCE
Status: CANCELLED | OUTPATIENT
Start: 2021-09-21

## 2021-08-24 RX ADMIN — HYDROCORTISONE SODIUM SUCCINATE 100 MG: 100 INJECTION, POWDER, FOR SOLUTION INTRAMUSCULAR; INTRAVENOUS at 08:53

## 2021-08-24 RX ADMIN — SODIUM CHLORIDE 20 ML/HR: 0.9 INJECTION, SOLUTION INTRAVENOUS at 08:30

## 2021-08-24 RX ADMIN — Medication 20 G: at 09:09

## 2021-08-24 RX ADMIN — ACETAMINOPHEN 650 MG: 325 TABLET, FILM COATED ORAL at 08:41

## 2021-08-24 RX ADMIN — DIPHENHYDRAMINE HYDROCHLORIDE 12.5 MG: 50 INJECTION, SOLUTION INTRAMUSCULAR; INTRAVENOUS at 08:31

## 2021-08-24 NOTE — PLAN OF CARE
Problem: Potential for Falls  Goal: Patient will remain free of falls  Description: INTERVENTIONS:  - Educate patient/family on patient safety including physical limitations  - Instruct patient to call for assistance with activity   - Consult OT/PT to assist with strengthening/mobility   - Keep Call bell within reach  - Keep bed low and locked with side rails adjusted as appropriate  - Keep care items and personal belongings within reach  - Initiate and maintain comfort rounds  - Make Fall Risk Sign visible to staff  - Offer    - Obtain necessary fall risk management e  - Apply yellow socks and bracelet for high fall risk patients  - Consider moving patient to room near nurses station  Outcome: Progressing

## 2021-09-06 DIAGNOSIS — F41.9 ANXIETY DISORDER, UNSPECIFIED TYPE: ICD-10-CM

## 2021-09-07 ENCOUNTER — TELEPHONE (OUTPATIENT)
Dept: HEMATOLOGY ONCOLOGY | Facility: CLINIC | Age: 66
End: 2021-09-07

## 2021-09-07 ENCOUNTER — HOSPITAL ENCOUNTER (OUTPATIENT)
Dept: INFUSION CENTER | Facility: CLINIC | Age: 66
Discharge: HOME | End: 2021-09-07
Payer: MEDICARE

## 2021-09-07 VITALS
SYSTOLIC BLOOD PRESSURE: 134 MMHG | DIASTOLIC BLOOD PRESSURE: 78 MMHG | RESPIRATION RATE: 18 BRPM | WEIGHT: 228.4 LBS | HEART RATE: 74 BPM | BODY MASS INDEX: 30.27 KG/M2 | HEIGHT: 73 IN

## 2021-09-07 DIAGNOSIS — D59.10 AUTOIMMUNE HEMOLYTIC ANEMIA (HCC): ICD-10-CM

## 2021-09-07 DIAGNOSIS — C91.10 CHRONIC LYMPHATIC LEUKEMIA (HCC): ICD-10-CM

## 2021-09-07 DIAGNOSIS — D80.1 HYPOGAMMAGLOBULINEMIA, ACQUIRED (HCC): ICD-10-CM

## 2021-09-07 DIAGNOSIS — C91.10 CLL (CHRONIC LYMPHOCYTIC LEUKEMIA) (HCC): Primary | ICD-10-CM

## 2021-09-07 LAB
ALBUMIN SERPL BCP-MCNC: 3.8 G/DL (ref 3.5–5)
ALP SERPL-CCNC: 62 U/L (ref 46–116)
ALT SERPL W P-5'-P-CCNC: 51 U/L (ref 12–78)
ANION GAP SERPL CALCULATED.3IONS-SCNC: 9 MMOL/L (ref 4–13)
AST SERPL W P-5'-P-CCNC: 29 U/L (ref 5–45)
BASOPHILS # BLD MANUAL: 0.05 THOUSAND/UL (ref 0–0.1)
BASOPHILS NFR MAR MANUAL: 1 % (ref 0–1)
BILIRUB SERPL-MCNC: 0.4 MG/DL (ref 0.2–1)
BUN SERPL-MCNC: 22 MG/DL (ref 5–25)
CALCIUM SERPL-MCNC: 8.7 MG/DL (ref 8.3–10.1)
CHLORIDE SERPL-SCNC: 108 MMOL/L (ref 100–108)
CO2 SERPL-SCNC: 26 MMOL/L (ref 21–32)
CREAT SERPL-MCNC: 1.19 MG/DL (ref 0.6–1.3)
EOSINOPHIL # BLD MANUAL: 0.2 THOUSAND/UL (ref 0–0.4)
EOSINOPHIL NFR BLD MANUAL: 4 % (ref 0–6)
ERYTHROCYTE [DISTWIDTH] IN BLOOD BY AUTOMATED COUNT: 14.1 % (ref 11.6–15.1)
GFR SERPL CREATININE-BSD FRML MDRD: 63 ML/MIN/1.73SQ M
GLUCOSE SERPL-MCNC: 150 MG/DL (ref 65–140)
HCT VFR BLD AUTO: 42.4 % (ref 36.5–49.3)
HGB BLD-MCNC: 13.8 G/DL (ref 12–17)
HGB RETIC QN AUTO: 34.6 PG (ref 30–38.3)
IGG SERPL-MCNC: 534 MG/DL (ref 700–1600)
IMM RETICS NFR: 9.6 % (ref 0–14)
LYMPHOCYTES # BLD AUTO: 0.55 THOUSAND/UL (ref 0.6–4.47)
LYMPHOCYTES # BLD AUTO: 11 % (ref 14–44)
MCH RBC QN AUTO: 30.3 PG (ref 26.8–34.3)
MCHC RBC AUTO-ENTMCNC: 32.5 G/DL (ref 31.4–37.4)
MCV RBC AUTO: 93 FL (ref 82–98)
MONOCYTES # BLD AUTO: 0.5 THOUSAND/UL (ref 0–1.22)
MONOCYTES NFR BLD: 10 % (ref 4–12)
NEUTROPHILS # BLD MANUAL: 3.72 THOUSAND/UL (ref 1.85–7.62)
NEUTS BAND NFR BLD MANUAL: 2 % (ref 0–8)
NEUTS SEG NFR BLD AUTO: 72 % (ref 43–75)
PLATELET # BLD AUTO: 134 THOUSANDS/UL (ref 149–390)
PLATELET BLD QL SMEAR: ABNORMAL
PMV BLD AUTO: 11.5 FL (ref 8.9–12.7)
POTASSIUM SERPL-SCNC: 4 MMOL/L (ref 3.5–5.3)
PROT SERPL-MCNC: 6.4 G/DL (ref 6.4–8.2)
RBC # BLD AUTO: 4.55 MILLION/UL (ref 3.88–5.62)
RBC MORPH BLD: NORMAL
RETICS # AUTO: ABNORMAL 10*3/UL (ref 14356–105094)
RETICS # CALC: 2.05 % (ref 0.37–1.87)
SODIUM SERPL-SCNC: 143 MMOL/L (ref 136–145)
WBC # BLD AUTO: 5.03 THOUSAND/UL (ref 4.31–10.16)

## 2021-09-07 PROCEDURE — 85046 RETICYTE/HGB CONCENTRATE: CPT | Performed by: INTERNAL MEDICINE

## 2021-09-07 PROCEDURE — 96415 CHEMO IV INFUSION ADDL HR: CPT

## 2021-09-07 PROCEDURE — 80053 COMPREHEN METABOLIC PANEL: CPT | Performed by: INTERNAL MEDICINE

## 2021-09-07 PROCEDURE — 82784 ASSAY IGA/IGD/IGG/IGM EACH: CPT | Performed by: INTERNAL MEDICINE

## 2021-09-07 PROCEDURE — 96413 CHEMO IV INFUSION 1 HR: CPT

## 2021-09-07 PROCEDURE — 85007 BL SMEAR W/DIFF WBC COUNT: CPT | Performed by: INTERNAL MEDICINE

## 2021-09-07 PROCEDURE — 96367 TX/PROPH/DG ADDL SEQ IV INF: CPT

## 2021-09-07 PROCEDURE — 85027 COMPLETE CBC AUTOMATED: CPT | Performed by: INTERNAL MEDICINE

## 2021-09-07 RX ORDER — ACETAMINOPHEN 325 MG/1
650 TABLET ORAL ONCE
Status: COMPLETED | OUTPATIENT
Start: 2021-09-07 | End: 2021-09-07

## 2021-09-07 RX ORDER — SODIUM CHLORIDE 9 MG/ML
20 INJECTION, SOLUTION INTRAVENOUS ONCE
Status: COMPLETED | OUTPATIENT
Start: 2021-09-07 | End: 2021-09-07

## 2021-09-07 RX ORDER — LORAZEPAM 0.5 MG/1
0.5 TABLET ORAL DAILY PRN
Qty: 30 TABLET | Refills: 0 | Status: SHIPPED | OUTPATIENT
Start: 2021-09-07 | End: 2022-01-31 | Stop reason: SDUPTHER

## 2021-09-07 RX ADMIN — DEXAMETHASONE SODIUM PHOSPHATE 20 MG: 10 INJECTION, SOLUTION INTRAMUSCULAR; INTRAVENOUS at 08:39

## 2021-09-07 RX ADMIN — ACETAMINOPHEN 650 MG: 325 TABLET, FILM COATED ORAL at 09:09

## 2021-09-07 RX ADMIN — SODIUM CHLORIDE 20 ML/HR: 0.9 INJECTION, SOLUTION INTRAVENOUS at 08:38

## 2021-09-07 RX ADMIN — OBINUTUZUMAB 1000 MG: 1000 INJECTION, SOLUTION, CONCENTRATE INTRAVENOUS at 10:08

## 2021-09-07 RX ADMIN — DIPHENHYDRAMINE HYDROCHLORIDE 25 MG: 50 INJECTION, SOLUTION INTRAMUSCULAR; INTRAVENOUS at 09:07

## 2021-09-07 NOTE — TELEPHONE ENCOUNTER
Medication Refill     Who is Calling pharmacy   Medication imbruvica 140 mg   How many pills left    Preferred Pharmacy / Address [facility]    Call back number [phone]   Relevant Information

## 2021-09-07 NOTE — PROGRESS NOTES
Patient arrived on unit for [facility]  Denies any present issues/concerns  Labs drawn as ordered   Treatment initiated

## 2021-09-08 DIAGNOSIS — E09.9 STEROID-INDUCED DIABETES (HCC): ICD-10-CM

## 2021-09-08 DIAGNOSIS — T38.0X5A STEROID-INDUCED DIABETES (HCC): ICD-10-CM

## 2021-09-08 RX ORDER — INSULIN LISPRO 100 [IU]/ML
INJECTION, SOLUTION INTRAVENOUS; SUBCUTANEOUS
Qty: 15 ML | Refills: 0 | Status: SHIPPED | OUTPATIENT
Start: 2021-09-08 | End: 2022-02-15 | Stop reason: SDUPTHER

## 2021-09-21 ENCOUNTER — HOSPITAL ENCOUNTER (OUTPATIENT)
Dept: INFUSION CENTER | Facility: CLINIC | Age: 66
Discharge: HOME | End: 2021-09-21
Payer: MEDICARE

## 2021-09-21 VITALS
DIASTOLIC BLOOD PRESSURE: 87 MMHG | WEIGHT: 232.37 LBS | SYSTOLIC BLOOD PRESSURE: 156 MMHG | BODY MASS INDEX: 30.66 KG/M2 | TEMPERATURE: 98.5 F | RESPIRATION RATE: 18 BRPM

## 2021-09-21 DIAGNOSIS — D80.1 HYPOGAMMAGLOBULINEMIA, ACQUIRED (HCC): ICD-10-CM

## 2021-09-21 DIAGNOSIS — C91.10 CLL (CHRONIC LYMPHOCYTIC LEUKEMIA) (HCC): Primary | ICD-10-CM

## 2021-09-21 PROCEDURE — 96365 THER/PROPH/DIAG IV INF INIT: CPT

## 2021-09-21 PROCEDURE — 96367 TX/PROPH/DG ADDL SEQ IV INF: CPT

## 2021-09-21 PROCEDURE — 96366 THER/PROPH/DIAG IV INF ADDON: CPT

## 2021-09-21 PROCEDURE — 96375 TX/PRO/DX INJ NEW DRUG ADDON: CPT

## 2021-09-21 RX ORDER — SODIUM CHLORIDE 9 MG/ML
20 INJECTION, SOLUTION INTRAVENOUS ONCE
Status: CANCELLED | OUTPATIENT
Start: 2021-10-19

## 2021-09-21 RX ORDER — ACETAMINOPHEN 325 MG/1
650 TABLET ORAL ONCE
Status: CANCELLED | OUTPATIENT
Start: 2021-10-19

## 2021-09-21 RX ORDER — ACETAMINOPHEN 325 MG/1
650 TABLET ORAL ONCE
Status: COMPLETED | OUTPATIENT
Start: 2021-09-21 | End: 2021-09-21

## 2021-09-21 RX ORDER — SODIUM CHLORIDE 9 MG/ML
20 INJECTION, SOLUTION INTRAVENOUS ONCE
Status: COMPLETED | OUTPATIENT
Start: 2021-09-21 | End: 2021-09-21

## 2021-09-21 RX ADMIN — HYDROCORTISONE SODIUM SUCCINATE 100 MG: 100 INJECTION, POWDER, FOR SOLUTION INTRAMUSCULAR; INTRAVENOUS at 09:10

## 2021-09-21 RX ADMIN — DIPHENHYDRAMINE HYDROCHLORIDE 12.5 MG: 50 INJECTION, SOLUTION INTRAMUSCULAR; INTRAVENOUS at 08:39

## 2021-09-21 RX ADMIN — ACETAMINOPHEN 650 MG: 325 TABLET, FILM COATED ORAL at 08:36

## 2021-09-21 RX ADMIN — SODIUM CHLORIDE 20 ML/HR: 0.9 INJECTION, SOLUTION INTRAVENOUS at 08:36

## 2021-09-21 RX ADMIN — Medication 20 G: at 09:41

## 2021-09-22 DIAGNOSIS — F41.9 ANXIETY: ICD-10-CM

## 2021-09-22 RX ORDER — CITALOPRAM 40 MG/1
TABLET ORAL
Qty: 90 TABLET | Refills: 5 | Status: SHIPPED | OUTPATIENT
Start: 2021-09-22

## 2021-10-05 ENCOUNTER — HOSPITAL ENCOUNTER (OUTPATIENT)
Dept: INFUSION CENTER | Facility: CLINIC | Age: 66
Discharge: HOME | End: 2021-10-05
Payer: MEDICARE

## 2021-10-05 ENCOUNTER — TELEPHONE (OUTPATIENT)
Dept: HEMATOLOGY ONCOLOGY | Facility: CLINIC | Age: 66
End: 2021-10-05

## 2021-10-05 VITALS
TEMPERATURE: 97.7 F | HEIGHT: 73 IN | DIASTOLIC BLOOD PRESSURE: 75 MMHG | WEIGHT: 232.37 LBS | BODY MASS INDEX: 30.8 KG/M2 | SYSTOLIC BLOOD PRESSURE: 137 MMHG | RESPIRATION RATE: 18 BRPM | HEART RATE: 79 BPM

## 2021-10-05 DIAGNOSIS — D80.1 HYPOGAMMAGLOBULINEMIA, ACQUIRED (HCC): ICD-10-CM

## 2021-10-05 DIAGNOSIS — D59.10 AUTOIMMUNE HEMOLYTIC ANEMIA (HCC): ICD-10-CM

## 2021-10-05 DIAGNOSIS — C91.10 CLL (CHRONIC LYMPHOCYTIC LEUKEMIA) (HCC): Primary | ICD-10-CM

## 2021-10-05 LAB
ALBUMIN SERPL BCP-MCNC: 3.7 G/DL (ref 3.5–5)
ALP SERPL-CCNC: 68 U/L (ref 46–116)
ALT SERPL W P-5'-P-CCNC: 39 U/L (ref 12–78)
ANION GAP SERPL CALCULATED.3IONS-SCNC: 8 MMOL/L (ref 4–13)
AST SERPL W P-5'-P-CCNC: 26 U/L (ref 5–45)
BASOPHILS # BLD MANUAL: 0.05 THOUSAND/UL (ref 0–0.1)
BASOPHILS NFR MAR MANUAL: 1 % (ref 0–1)
BILIRUB SERPL-MCNC: 0.32 MG/DL (ref 0.2–1)
BUN SERPL-MCNC: 17 MG/DL (ref 5–25)
CALCIUM SERPL-MCNC: 8.6 MG/DL (ref 8.3–10.1)
CHLORIDE SERPL-SCNC: 109 MMOL/L (ref 100–108)
CO2 SERPL-SCNC: 27 MMOL/L (ref 21–32)
CREAT SERPL-MCNC: 1.17 MG/DL (ref 0.6–1.3)
EOSINOPHIL # BLD MANUAL: 0.16 THOUSAND/UL (ref 0–0.4)
EOSINOPHIL NFR BLD MANUAL: 3 % (ref 0–6)
ERYTHROCYTE [DISTWIDTH] IN BLOOD BY AUTOMATED COUNT: 13.5 % (ref 11.6–15.1)
GFR SERPL CREATININE-BSD FRML MDRD: 64 ML/MIN/1.73SQ M
GLUCOSE SERPL-MCNC: 105 MG/DL (ref 65–140)
HCT VFR BLD AUTO: 42.4 % (ref 36.5–49.3)
HGB BLD-MCNC: 13.9 G/DL (ref 12–17)
HGB RETIC QN AUTO: 33.3 PG (ref 30–38.3)
IGG SERPL-MCNC: 502 MG/DL (ref 700–1600)
IMM RETICS NFR: 10.4 % (ref 0–14)
LG PLATELETS BLD QL SMEAR: PRESENT
LYMPHOCYTES # BLD AUTO: 0.64 THOUSAND/UL (ref 0.6–4.47)
LYMPHOCYTES # BLD AUTO: 12 % (ref 14–44)
MCH RBC QN AUTO: 30.8 PG (ref 26.8–34.3)
MCHC RBC AUTO-ENTMCNC: 32.8 G/DL (ref 31.4–37.4)
MCV RBC AUTO: 94 FL (ref 82–98)
METAMYELOCYTES NFR BLD MANUAL: 2 % (ref 0–1)
MONOCYTES # BLD AUTO: 1.06 THOUSAND/UL (ref 0–1.22)
MONOCYTES NFR BLD: 20 % (ref 4–12)
NEUTROPHILS # BLD MANUAL: 3.29 THOUSAND/UL (ref 1.85–7.62)
NEUTS SEG NFR BLD AUTO: 62 % (ref 43–75)
PLATELET # BLD AUTO: 143 THOUSANDS/UL (ref 149–390)
PLATELET BLD QL SMEAR: ABNORMAL
PMV BLD AUTO: 11.5 FL (ref 8.9–12.7)
POTASSIUM SERPL-SCNC: 3.9 MMOL/L (ref 3.5–5.3)
PROT SERPL-MCNC: 6.4 G/DL (ref 6.4–8.2)
RBC # BLD AUTO: 4.52 MILLION/UL (ref 3.88–5.62)
RBC MORPH BLD: NORMAL
RETICS # AUTO: NORMAL 10*3/UL (ref 14356–105094)
RETICS # CALC: 1.84 % (ref 0.37–1.87)
SODIUM SERPL-SCNC: 144 MMOL/L (ref 136–145)
WBC # BLD AUTO: 5.3 THOUSAND/UL (ref 4.31–10.16)

## 2021-10-05 PROCEDURE — 82784 ASSAY IGA/IGD/IGG/IGM EACH: CPT | Performed by: INTERNAL MEDICINE

## 2021-10-05 PROCEDURE — 85046 RETICYTE/HGB CONCENTRATE: CPT | Performed by: INTERNAL MEDICINE

## 2021-10-05 PROCEDURE — 96415 CHEMO IV INFUSION ADDL HR: CPT

## 2021-10-05 PROCEDURE — 85007 BL SMEAR W/DIFF WBC COUNT: CPT | Performed by: INTERNAL MEDICINE

## 2021-10-05 PROCEDURE — 80053 COMPREHEN METABOLIC PANEL: CPT | Performed by: INTERNAL MEDICINE

## 2021-10-05 PROCEDURE — 85027 COMPLETE CBC AUTOMATED: CPT | Performed by: INTERNAL MEDICINE

## 2021-10-05 PROCEDURE — 96413 CHEMO IV INFUSION 1 HR: CPT

## 2021-10-05 PROCEDURE — 96367 TX/PROPH/DG ADDL SEQ IV INF: CPT

## 2021-10-05 PROCEDURE — 85025 COMPLETE CBC W/AUTO DIFF WBC: CPT | Performed by: INTERNAL MEDICINE

## 2021-10-05 RX ORDER — SODIUM CHLORIDE 9 MG/ML
20 INJECTION, SOLUTION INTRAVENOUS ONCE
Status: COMPLETED | OUTPATIENT
Start: 2021-10-05 | End: 2021-10-05

## 2021-10-05 RX ORDER — ACETAMINOPHEN 325 MG/1
650 TABLET ORAL ONCE
Status: COMPLETED | OUTPATIENT
Start: 2021-10-05 | End: 2021-10-05

## 2021-10-05 RX ADMIN — DIPHENHYDRAMINE HYDROCHLORIDE 25 MG: 50 INJECTION, SOLUTION INTRAMUSCULAR; INTRAVENOUS at 08:53

## 2021-10-05 RX ADMIN — OBINUTUZUMAB 1000 MG: 1000 INJECTION, SOLUTION, CONCENTRATE INTRAVENOUS at 10:16

## 2021-10-05 RX ADMIN — ACETAMINOPHEN 650 MG: 325 TABLET, FILM COATED ORAL at 08:32

## 2021-10-05 RX ADMIN — DEXAMETHASONE SODIUM PHOSPHATE 20 MG: 10 INJECTION, SOLUTION INTRAMUSCULAR; INTRAVENOUS at 08:29

## 2021-10-05 RX ADMIN — SODIUM CHLORIDE 20 ML/HR: 0.9 INJECTION, SOLUTION INTRAVENOUS at 08:29

## 2021-10-06 ENCOUNTER — OFFICE VISIT (OUTPATIENT)
Dept: HEMATOLOGY ONCOLOGY | Facility: CLINIC | Age: 66
End: 2021-10-06
Payer: MEDICARE

## 2021-10-06 VITALS
OXYGEN SATURATION: 98 % | HEART RATE: 71 BPM | DIASTOLIC BLOOD PRESSURE: 70 MMHG | HEIGHT: 73 IN | WEIGHT: 236 LBS | BODY MASS INDEX: 31.28 KG/M2 | TEMPERATURE: 98.1 F | RESPIRATION RATE: 18 BRPM | SYSTOLIC BLOOD PRESSURE: 128 MMHG

## 2021-10-06 DIAGNOSIS — C91.10 CHRONIC LYMPHOCYTIC LEUKEMIA (HCC): Primary | ICD-10-CM

## 2021-10-06 DIAGNOSIS — D59.10 AUTOIMMUNE HEMOLYTIC ANEMIA (HCC): ICD-10-CM

## 2021-10-06 DIAGNOSIS — J44.9 CHRONIC OBSTRUCTIVE PULMONARY DISEASE, UNSPECIFIED COPD TYPE (HCC): ICD-10-CM

## 2021-10-06 DIAGNOSIS — D75.82 HIT (HEPARIN-INDUCED THROMBOCYTOPENIA) (HCC): ICD-10-CM

## 2021-10-06 DIAGNOSIS — D69.6 THROMBOCYTOPENIA (HCC): ICD-10-CM

## 2021-10-06 DIAGNOSIS — D80.1 HYPOGAMMAGLOBULINEMIA, ACQUIRED (HCC): ICD-10-CM

## 2021-10-06 PROCEDURE — 99214 OFFICE O/P EST MOD 30 MIN: CPT | Performed by: INTERNAL MEDICINE

## 2021-10-11 ENCOUNTER — OFFICE VISIT (OUTPATIENT)
Dept: CARDIOLOGY CLINIC | Facility: CLINIC | Age: 66
End: 2021-10-11
Payer: MEDICARE

## 2021-10-11 VITALS
HEIGHT: 72 IN | DIASTOLIC BLOOD PRESSURE: 72 MMHG | HEART RATE: 77 BPM | WEIGHT: 229.2 LBS | BODY MASS INDEX: 31.04 KG/M2 | OXYGEN SATURATION: 96 % | SYSTOLIC BLOOD PRESSURE: 116 MMHG

## 2021-10-11 DIAGNOSIS — E78.2 MIXED HYPERLIPIDEMIA: ICD-10-CM

## 2021-10-11 DIAGNOSIS — I25.10 CORONARY ARTERY DISEASE INVOLVING NATIVE CORONARY ARTERY OF NATIVE HEART WITHOUT ANGINA PECTORIS: Primary | ICD-10-CM

## 2021-10-11 DIAGNOSIS — I10 PRIMARY HYPERTENSION: ICD-10-CM

## 2021-10-11 PROCEDURE — 99214 OFFICE O/P EST MOD 30 MIN: CPT | Performed by: INTERNAL MEDICINE

## 2021-10-19 ENCOUNTER — HOSPITAL ENCOUNTER (OUTPATIENT)
Dept: INFUSION CENTER | Facility: CLINIC | Age: 66
Discharge: HOME | End: 2021-10-19
Payer: MEDICARE

## 2021-10-19 VITALS
HEART RATE: 76 BPM | BODY MASS INDEX: 31.63 KG/M2 | DIASTOLIC BLOOD PRESSURE: 83 MMHG | RESPIRATION RATE: 18 BRPM | TEMPERATURE: 98.5 F | WEIGHT: 233.25 LBS | SYSTOLIC BLOOD PRESSURE: 143 MMHG

## 2021-10-19 DIAGNOSIS — D80.1 HYPOGAMMAGLOBULINEMIA, ACQUIRED (HCC): ICD-10-CM

## 2021-10-19 DIAGNOSIS — C91.10 CLL (CHRONIC LYMPHOCYTIC LEUKEMIA) (HCC): Primary | ICD-10-CM

## 2021-10-19 PROCEDURE — 96366 THER/PROPH/DIAG IV INF ADDON: CPT

## 2021-10-19 PROCEDURE — 96365 THER/PROPH/DIAG IV INF INIT: CPT

## 2021-10-19 PROCEDURE — 96367 TX/PROPH/DG ADDL SEQ IV INF: CPT

## 2021-10-19 PROCEDURE — 96375 TX/PRO/DX INJ NEW DRUG ADDON: CPT

## 2021-10-19 RX ORDER — ACETAMINOPHEN 325 MG/1
650 TABLET ORAL ONCE
Status: CANCELLED | OUTPATIENT
Start: 2021-11-16

## 2021-10-19 RX ORDER — SODIUM CHLORIDE 9 MG/ML
20 INJECTION, SOLUTION INTRAVENOUS ONCE
Status: COMPLETED | OUTPATIENT
Start: 2021-10-19 | End: 2021-10-19

## 2021-10-19 RX ORDER — ACETAMINOPHEN 325 MG/1
650 TABLET ORAL ONCE
Status: COMPLETED | OUTPATIENT
Start: 2021-10-19 | End: 2021-10-19

## 2021-10-19 RX ORDER — SODIUM CHLORIDE 9 MG/ML
20 INJECTION, SOLUTION INTRAVENOUS ONCE
Status: CANCELLED | OUTPATIENT
Start: 2021-11-16

## 2021-10-19 RX ADMIN — SODIUM CHLORIDE 20 ML/HR: 0.9 INJECTION, SOLUTION INTRAVENOUS at 08:24

## 2021-10-19 RX ADMIN — ACETAMINOPHEN 650 MG: 325 TABLET, FILM COATED ORAL at 08:27

## 2021-10-19 RX ADMIN — Medication 20 G: at 09:11

## 2021-10-19 RX ADMIN — DIPHENHYDRAMINE HYDROCHLORIDE 12.5 MG: 50 INJECTION, SOLUTION INTRAMUSCULAR; INTRAVENOUS at 08:28

## 2021-10-19 RX ADMIN — HYDROCORTISONE SODIUM SUCCINATE 100 MG: 100 INJECTION, POWDER, FOR SOLUTION INTRAMUSCULAR; INTRAVENOUS at 08:55

## 2021-10-22 ENCOUNTER — TELEPHONE (OUTPATIENT)
Dept: HEMATOLOGY ONCOLOGY | Facility: CLINIC | Age: 66
End: 2021-10-22

## 2021-10-22 DIAGNOSIS — C91.10 CHRONIC LYMPHATIC LEUKEMIA (HCC): ICD-10-CM

## 2021-10-27 ENCOUNTER — TELEPHONE (OUTPATIENT)
Dept: FAMILY MEDICINE CLINIC | Facility: CLINIC | Age: 66
End: 2021-10-27

## 2021-10-28 ENCOUNTER — TELEPHONE (OUTPATIENT)
Dept: FAMILY MEDICINE CLINIC | Facility: CLINIC | Age: 66
End: 2021-10-28

## 2021-10-28 DIAGNOSIS — R05.9 COUGH: ICD-10-CM

## 2021-10-28 DIAGNOSIS — R51.9 NONINTRACTABLE HEADACHE, UNSPECIFIED CHRONICITY PATTERN, UNSPECIFIED HEADACHE TYPE: Primary | ICD-10-CM

## 2021-10-28 PROCEDURE — U0005 INFEC AGEN DETEC AMPLI PROBE: HCPCS | Performed by: INTERNAL MEDICINE

## 2021-10-28 PROCEDURE — U0003 INFECTIOUS AGENT DETECTION BY NUCLEIC ACID (DNA OR RNA); SEVERE ACUTE RESPIRATORY SYNDROME CORONAVIRUS 2 (SARS-COV-2) (CORONAVIRUS DISEASE [COVID-19]), AMPLIFIED PROBE TECHNIQUE, MAKING USE OF HIGH THROUGHPUT TECHNOLOGIES AS DESCRIBED BY CMS-2020-01-R: HCPCS | Performed by: INTERNAL MEDICINE

## 2021-11-02 ENCOUNTER — HOSPITAL ENCOUNTER (OUTPATIENT)
Dept: INFUSION CENTER | Facility: CLINIC | Age: 66
Discharge: HOME | End: 2021-11-02
Payer: MEDICARE

## 2021-11-02 VITALS
BODY MASS INDEX: 31.31 KG/M2 | TEMPERATURE: 96.3 F | SYSTOLIC BLOOD PRESSURE: 155 MMHG | DIASTOLIC BLOOD PRESSURE: 83 MMHG | RESPIRATION RATE: 18 BRPM | WEIGHT: 230.82 LBS | HEART RATE: 72 BPM

## 2021-11-02 DIAGNOSIS — C91.10 CLL (CHRONIC LYMPHOCYTIC LEUKEMIA) (HCC): Primary | ICD-10-CM

## 2021-11-02 LAB
ALBUMIN SERPL BCP-MCNC: 3.8 G/DL (ref 3.5–5)
ALP SERPL-CCNC: 51 U/L (ref 46–116)
ALT SERPL W P-5'-P-CCNC: 49 U/L (ref 12–78)
ANION GAP SERPL CALCULATED.3IONS-SCNC: 10 MMOL/L (ref 4–13)
AST SERPL W P-5'-P-CCNC: 24 U/L (ref 5–45)
BASOPHILS # BLD MANUAL: 0.05 THOUSAND/UL (ref 0–0.1)
BASOPHILS NFR MAR MANUAL: 1 % (ref 0–1)
BILIRUB SERPL-MCNC: 0.37 MG/DL (ref 0.2–1)
BUN SERPL-MCNC: 20 MG/DL (ref 5–25)
CALCIUM SERPL-MCNC: 8.8 MG/DL (ref 8.3–10.1)
CHLORIDE SERPL-SCNC: 104 MMOL/L (ref 100–108)
CO2 SERPL-SCNC: 24 MMOL/L (ref 21–32)
CREAT SERPL-MCNC: 1.16 MG/DL (ref 0.6–1.3)
EOSINOPHIL # BLD MANUAL: 0.11 THOUSAND/UL (ref 0–0.4)
EOSINOPHIL NFR BLD MANUAL: 2 % (ref 0–6)
ERYTHROCYTE [DISTWIDTH] IN BLOOD BY AUTOMATED COUNT: 13.6 % (ref 11.6–15.1)
GFR SERPL CREATININE-BSD FRML MDRD: 65 ML/MIN/1.73SQ M
GLUCOSE SERPL-MCNC: 165 MG/DL (ref 65–140)
HCT VFR BLD AUTO: 42.7 % (ref 36.5–49.3)
HGB BLD-MCNC: 14 G/DL (ref 12–17)
IGG SERPL-MCNC: 544 MG/DL (ref 700–1600)
LYMPHOCYTES # BLD AUTO: 0.58 THOUSAND/UL (ref 0.6–4.47)
LYMPHOCYTES # BLD AUTO: 11 % (ref 14–44)
MCH RBC QN AUTO: 31 PG (ref 26.8–34.3)
MCHC RBC AUTO-ENTMCNC: 32.8 G/DL (ref 31.4–37.4)
MCV RBC AUTO: 95 FL (ref 82–98)
MONOCYTES # BLD AUTO: 0.96 THOUSAND/UL (ref 0–1.22)
MONOCYTES NFR BLD: 18 % (ref 4–12)
NEUTROPHILS # BLD MANUAL: 3.5 THOUSAND/UL (ref 1.85–7.62)
NEUTS BAND NFR BLD MANUAL: 1 % (ref 0–8)
NEUTS SEG NFR BLD AUTO: 65 % (ref 43–75)
NRBC BLD AUTO-RTO: 1 /100 WBC (ref 0–2)
PLATELET # BLD AUTO: 148 THOUSANDS/UL (ref 149–390)
PLATELET BLD QL SMEAR: ABNORMAL
PMV BLD AUTO: 12.2 FL (ref 8.9–12.7)
POTASSIUM SERPL-SCNC: 4 MMOL/L (ref 3.5–5.3)
PROT SERPL-MCNC: 6.5 G/DL (ref 6.4–8.2)
RBC # BLD AUTO: 4.52 MILLION/UL (ref 3.88–5.62)
RBC MORPH BLD: NORMAL
RETICS # AUTO: NORMAL 10*3/UL (ref 14356–105094)
RETICS # CALC: 1.52 % (ref 0.37–1.87)
SODIUM SERPL-SCNC: 138 MMOL/L (ref 136–145)
VARIANT LYMPHS # BLD AUTO: 2 %
WBC # BLD AUTO: 5.31 THOUSAND/UL (ref 4.31–10.16)

## 2021-11-02 PROCEDURE — 85045 AUTOMATED RETICULOCYTE COUNT: CPT | Performed by: INTERNAL MEDICINE

## 2021-11-02 PROCEDURE — 96375 TX/PRO/DX INJ NEW DRUG ADDON: CPT

## 2021-11-02 PROCEDURE — 82784 ASSAY IGA/IGD/IGG/IGM EACH: CPT | Performed by: INTERNAL MEDICINE

## 2021-11-02 PROCEDURE — 96415 CHEMO IV INFUSION ADDL HR: CPT

## 2021-11-02 PROCEDURE — 96367 TX/PROPH/DG ADDL SEQ IV INF: CPT

## 2021-11-02 PROCEDURE — 80053 COMPREHEN METABOLIC PANEL: CPT | Performed by: INTERNAL MEDICINE

## 2021-11-02 PROCEDURE — 85007 BL SMEAR W/DIFF WBC COUNT: CPT | Performed by: INTERNAL MEDICINE

## 2021-11-02 PROCEDURE — 85027 COMPLETE CBC AUTOMATED: CPT | Performed by: INTERNAL MEDICINE

## 2021-11-02 PROCEDURE — 96413 CHEMO IV INFUSION 1 HR: CPT

## 2021-11-02 RX ORDER — ACETAMINOPHEN 325 MG/1
650 TABLET ORAL ONCE
Status: COMPLETED | OUTPATIENT
Start: 2021-11-02 | End: 2021-11-02

## 2021-11-02 RX ORDER — SODIUM CHLORIDE 9 MG/ML
20 INJECTION, SOLUTION INTRAVENOUS ONCE
Status: COMPLETED | OUTPATIENT
Start: 2021-11-02 | End: 2021-11-02

## 2021-11-02 RX ADMIN — DIPHENHYDRAMINE HYDROCHLORIDE 25 MG: 50 INJECTION, SOLUTION INTRAMUSCULAR; INTRAVENOUS at 08:43

## 2021-11-02 RX ADMIN — ACETAMINOPHEN 650 MG: 325 TABLET, FILM COATED ORAL at 08:47

## 2021-11-02 RX ADMIN — OBINUTUZUMAB 1000 MG: 1000 INJECTION, SOLUTION, CONCENTRATE INTRAVENOUS at 10:13

## 2021-11-02 RX ADMIN — DEXAMETHASONE SODIUM PHOSPHATE 20 MG: 10 INJECTION, SOLUTION INTRAMUSCULAR; INTRAVENOUS at 09:04

## 2021-11-02 RX ADMIN — SODIUM CHLORIDE 20 ML/HR: 0.9 INJECTION, SOLUTION INTRAVENOUS at 08:30

## 2021-11-06 ENCOUNTER — NURSE TRIAGE (OUTPATIENT)
Dept: OTHER | Facility: OTHER | Age: 66
End: 2021-11-06

## 2021-11-06 DIAGNOSIS — Z11.59 SPECIAL SCREENING EXAMINATION FOR VIRAL DISEASE: Primary | ICD-10-CM

## 2021-11-06 PROCEDURE — U0003 INFECTIOUS AGENT DETECTION BY NUCLEIC ACID (DNA OR RNA); SEVERE ACUTE RESPIRATORY SYNDROME CORONAVIRUS 2 (SARS-COV-2) (CORONAVIRUS DISEASE [COVID-19]), AMPLIFIED PROBE TECHNIQUE, MAKING USE OF HIGH THROUGHPUT TECHNOLOGIES AS DESCRIBED BY CMS-2020-01-R: HCPCS | Performed by: INTERNAL MEDICINE

## 2021-11-06 PROCEDURE — U0005 INFEC AGEN DETEC AMPLI PROBE: HCPCS | Performed by: INTERNAL MEDICINE

## 2021-11-16 ENCOUNTER — HOSPITAL ENCOUNTER (OUTPATIENT)
Dept: INFUSION CENTER | Facility: CLINIC | Age: 66
Discharge: HOME | End: 2021-11-16
Payer: MEDICARE

## 2021-11-16 VITALS
SYSTOLIC BLOOD PRESSURE: 146 MMHG | HEART RATE: 69 BPM | TEMPERATURE: 97.6 F | BODY MASS INDEX: 31.19 KG/M2 | RESPIRATION RATE: 16 BRPM | DIASTOLIC BLOOD PRESSURE: 77 MMHG | WEIGHT: 229.94 LBS

## 2021-11-16 DIAGNOSIS — C91.10 CLL (CHRONIC LYMPHOCYTIC LEUKEMIA) (HCC): Primary | ICD-10-CM

## 2021-11-16 DIAGNOSIS — D80.1 HYPOGAMMAGLOBULINEMIA, ACQUIRED (HCC): ICD-10-CM

## 2021-11-16 PROCEDURE — 96366 THER/PROPH/DIAG IV INF ADDON: CPT

## 2021-11-16 PROCEDURE — 96365 THER/PROPH/DIAG IV INF INIT: CPT

## 2021-11-16 PROCEDURE — 96367 TX/PROPH/DG ADDL SEQ IV INF: CPT

## 2021-11-16 PROCEDURE — 96375 TX/PRO/DX INJ NEW DRUG ADDON: CPT

## 2021-11-16 RX ORDER — SODIUM CHLORIDE 9 MG/ML
20 INJECTION, SOLUTION INTRAVENOUS ONCE
Status: COMPLETED | OUTPATIENT
Start: 2021-11-16 | End: 2021-11-16

## 2021-11-16 RX ORDER — ACETAMINOPHEN 325 MG/1
650 TABLET ORAL ONCE
Status: CANCELLED | OUTPATIENT
Start: 2021-12-14

## 2021-11-16 RX ORDER — SODIUM CHLORIDE 9 MG/ML
20 INJECTION, SOLUTION INTRAVENOUS ONCE
Status: CANCELLED | OUTPATIENT
Start: 2021-12-14

## 2021-11-16 RX ORDER — ACETAMINOPHEN 325 MG/1
650 TABLET ORAL ONCE
Status: COMPLETED | OUTPATIENT
Start: 2021-11-16 | End: 2021-11-16

## 2021-11-16 RX ADMIN — DIPHENHYDRAMINE HYDROCHLORIDE 12.5 MG: 50 INJECTION, SOLUTION INTRAMUSCULAR; INTRAVENOUS at 08:29

## 2021-11-16 RX ADMIN — HYDROCORTISONE SODIUM SUCCINATE 100 MG: 100 INJECTION, POWDER, FOR SOLUTION INTRAMUSCULAR; INTRAVENOUS at 09:02

## 2021-11-16 RX ADMIN — SODIUM CHLORIDE 20 ML/HR: 0.9 INJECTION, SOLUTION INTRAVENOUS at 08:25

## 2021-11-16 RX ADMIN — Medication 20 G: at 09:21

## 2021-11-16 RX ADMIN — ACETAMINOPHEN 650 MG: 325 TABLET, FILM COATED ORAL at 08:28

## 2021-11-17 ENCOUNTER — TELEPHONE (OUTPATIENT)
Dept: ENDOCRINOLOGY | Facility: CLINIC | Age: 66
End: 2021-11-17

## 2021-11-17 DIAGNOSIS — E78.2 MIXED HYPERLIPIDEMIA: Primary | ICD-10-CM

## 2021-11-17 DIAGNOSIS — E11.9 TYPE 2 DIABETES MELLITUS WITHOUT COMPLICATION, WITH LONG-TERM CURRENT USE OF INSULIN (HCC): ICD-10-CM

## 2021-11-17 DIAGNOSIS — Z79.4 TYPE 2 DIABETES MELLITUS WITHOUT COMPLICATION, WITH LONG-TERM CURRENT USE OF INSULIN (HCC): ICD-10-CM

## 2021-11-24 ENCOUNTER — OFFICE VISIT (OUTPATIENT)
Dept: HEMATOLOGY ONCOLOGY | Facility: CLINIC | Age: 66
End: 2021-11-24
Payer: MEDICARE

## 2021-11-24 VITALS
HEART RATE: 77 BPM | SYSTOLIC BLOOD PRESSURE: 142 MMHG | OXYGEN SATURATION: 97 % | BODY MASS INDEX: 30.59 KG/M2 | HEIGHT: 73 IN | DIASTOLIC BLOOD PRESSURE: 74 MMHG | TEMPERATURE: 97.7 F | RESPIRATION RATE: 18 BRPM | WEIGHT: 230.8 LBS

## 2021-11-24 DIAGNOSIS — C91.10 CLL (CHRONIC LYMPHOCYTIC LEUKEMIA) (HCC): Primary | ICD-10-CM

## 2021-11-24 PROCEDURE — 99214 OFFICE O/P EST MOD 30 MIN: CPT | Performed by: PHYSICIAN ASSISTANT

## 2021-11-30 ENCOUNTER — HOSPITAL ENCOUNTER (OUTPATIENT)
Dept: INFUSION CENTER | Facility: CLINIC | Age: 66
Discharge: HOME | End: 2021-11-30
Payer: MEDICARE

## 2021-11-30 VITALS
TEMPERATURE: 97.6 F | BODY MASS INDEX: 30.9 KG/M2 | DIASTOLIC BLOOD PRESSURE: 83 MMHG | RESPIRATION RATE: 18 BRPM | HEART RATE: 80 BPM | SYSTOLIC BLOOD PRESSURE: 153 MMHG | WEIGHT: 234.13 LBS

## 2021-11-30 DIAGNOSIS — C91.10 CLL (CHRONIC LYMPHOCYTIC LEUKEMIA) (HCC): Primary | ICD-10-CM

## 2021-11-30 DIAGNOSIS — Z79.4 TYPE 2 DIABETES MELLITUS WITHOUT COMPLICATION, WITH LONG-TERM CURRENT USE OF INSULIN (HCC): ICD-10-CM

## 2021-11-30 DIAGNOSIS — E11.9 TYPE 2 DIABETES MELLITUS WITHOUT COMPLICATION, WITH LONG-TERM CURRENT USE OF INSULIN (HCC): ICD-10-CM

## 2021-11-30 DIAGNOSIS — E78.2 MIXED HYPERLIPIDEMIA: ICD-10-CM

## 2021-11-30 LAB
ALBUMIN SERPL BCP-MCNC: 3.8 G/DL (ref 3.5–5)
ALP SERPL-CCNC: 69 U/L (ref 46–116)
ALT SERPL W P-5'-P-CCNC: 50 U/L (ref 12–78)
ANION GAP SERPL CALCULATED.3IONS-SCNC: 10 MMOL/L (ref 4–13)
AST SERPL W P-5'-P-CCNC: 24 U/L (ref 5–45)
BASOPHILS # BLD MANUAL: 0 THOUSAND/UL (ref 0–0.1)
BASOPHILS NFR MAR MANUAL: 0 % (ref 0–1)
BILIRUB SERPL-MCNC: 0.44 MG/DL (ref 0.2–1)
BUN SERPL-MCNC: 17 MG/DL (ref 5–25)
CALCIUM SERPL-MCNC: 9.4 MG/DL (ref 8.3–10.1)
CHLORIDE SERPL-SCNC: 105 MMOL/L (ref 100–108)
CHOLEST SERPL-MCNC: 169 MG/DL
CO2 SERPL-SCNC: 27 MMOL/L (ref 21–32)
CREAT SERPL-MCNC: 1.24 MG/DL (ref 0.6–1.3)
CREAT UR-MCNC: 82.4 MG/DL
EOSINOPHIL # BLD MANUAL: 0.65 THOUSAND/UL (ref 0–0.4)
EOSINOPHIL NFR BLD MANUAL: 11 % (ref 0–6)
ERYTHROCYTE [DISTWIDTH] IN BLOOD BY AUTOMATED COUNT: 13.5 % (ref 11.6–15.1)
EST. AVERAGE GLUCOSE BLD GHB EST-MCNC: 166 MG/DL
GFR SERPL CREATININE-BSD FRML MDRD: 60 ML/MIN/1.73SQ M
GLUCOSE P FAST SERPL-MCNC: 149 MG/DL (ref 65–99)
GLUCOSE SERPL-MCNC: 149 MG/DL (ref 65–140)
HBA1C MFR BLD: 7.4 %
HCT VFR BLD AUTO: 45.1 % (ref 36.5–49.3)
HDLC SERPL-MCNC: 27 MG/DL
HGB BLD-MCNC: 14.9 G/DL (ref 12–17)
IGG SERPL-MCNC: 547 MG/DL (ref 700–1600)
LDLC SERPL CALC-MCNC: 113 MG/DL (ref 0–100)
LYMPHOCYTES # BLD AUTO: 0.83 THOUSAND/UL (ref 0.6–4.47)
LYMPHOCYTES # BLD AUTO: 14 % (ref 14–44)
MCH RBC QN AUTO: 31.2 PG (ref 26.8–34.3)
MCHC RBC AUTO-ENTMCNC: 33 G/DL (ref 31.4–37.4)
MCV RBC AUTO: 94 FL (ref 82–98)
MICROALBUMIN UR-MCNC: 5.8 MG/L (ref 0–20)
MICROALBUMIN/CREAT 24H UR: 7 MG/G CREATININE (ref 0–30)
MONOCYTES # BLD AUTO: 0.71 THOUSAND/UL (ref 0–1.22)
MONOCYTES NFR BLD: 12 % (ref 4–12)
NEUTROPHILS # BLD MANUAL: 3.74 THOUSAND/UL (ref 1.85–7.62)
NEUTS SEG NFR BLD AUTO: 63 % (ref 43–75)
NONHDLC SERPL-MCNC: 142 MG/DL
PLATELET # BLD AUTO: 157 THOUSANDS/UL (ref 149–390)
PLATELET BLD QL SMEAR: ABNORMAL
PMV BLD AUTO: 12 FL (ref 8.9–12.7)
POTASSIUM SERPL-SCNC: 4.1 MMOL/L (ref 3.5–5.3)
PROT SERPL-MCNC: 6.3 G/DL (ref 6.4–8.2)
RBC # BLD AUTO: 4.78 MILLION/UL (ref 3.88–5.62)
RBC MORPH BLD: NORMAL
RETICS # AUTO: NORMAL 10*3/UL (ref 14356–105094)
RETICS # CALC: 1.79 % (ref 0.37–1.87)
SODIUM SERPL-SCNC: 142 MMOL/L (ref 136–145)
TRIGL SERPL-MCNC: 147 MG/DL
WBC # BLD AUTO: 5.93 THOUSAND/UL (ref 4.31–10.16)

## 2021-11-30 PROCEDURE — 82043 UR ALBUMIN QUANTITATIVE: CPT | Performed by: INTERNAL MEDICINE

## 2021-11-30 PROCEDURE — 82784 ASSAY IGA/IGD/IGG/IGM EACH: CPT | Performed by: INTERNAL MEDICINE

## 2021-11-30 PROCEDURE — 85027 COMPLETE CBC AUTOMATED: CPT | Performed by: INTERNAL MEDICINE

## 2021-11-30 PROCEDURE — 85045 AUTOMATED RETICULOCYTE COUNT: CPT | Performed by: INTERNAL MEDICINE

## 2021-11-30 PROCEDURE — 83036 HEMOGLOBIN GLYCOSYLATED A1C: CPT | Performed by: INTERNAL MEDICINE

## 2021-11-30 PROCEDURE — 96367 TX/PROPH/DG ADDL SEQ IV INF: CPT

## 2021-11-30 PROCEDURE — 96413 CHEMO IV INFUSION 1 HR: CPT

## 2021-11-30 PROCEDURE — 96415 CHEMO IV INFUSION ADDL HR: CPT

## 2021-11-30 PROCEDURE — 85007 BL SMEAR W/DIFF WBC COUNT: CPT | Performed by: INTERNAL MEDICINE

## 2021-11-30 PROCEDURE — 82570 ASSAY OF URINE CREATININE: CPT | Performed by: INTERNAL MEDICINE

## 2021-11-30 PROCEDURE — 80061 LIPID PANEL: CPT | Performed by: INTERNAL MEDICINE

## 2021-11-30 PROCEDURE — 80053 COMPREHEN METABOLIC PANEL: CPT | Performed by: INTERNAL MEDICINE

## 2021-11-30 RX ORDER — ACETAMINOPHEN 325 MG/1
650 TABLET ORAL ONCE
Status: COMPLETED | OUTPATIENT
Start: 2021-11-30 | End: 2021-11-30

## 2021-11-30 RX ORDER — SODIUM CHLORIDE 9 MG/ML
20 INJECTION, SOLUTION INTRAVENOUS ONCE
Status: COMPLETED | OUTPATIENT
Start: 2021-11-30 | End: 2021-11-30

## 2021-11-30 RX ADMIN — DEXAMETHASONE SODIUM PHOSPHATE 20 MG: 10 INJECTION, SOLUTION INTRAMUSCULAR; INTRAVENOUS at 08:42

## 2021-11-30 RX ADMIN — OBINUTUZUMAB 1000 MG: 1000 INJECTION, SOLUTION, CONCENTRATE INTRAVENOUS at 09:54

## 2021-11-30 RX ADMIN — ACETAMINOPHEN 650 MG: 325 TABLET, FILM COATED ORAL at 09:01

## 2021-11-30 RX ADMIN — SODIUM CHLORIDE 20 ML/HR: 0.9 INJECTION, SOLUTION INTRAVENOUS at 08:41

## 2021-11-30 RX ADMIN — DIPHENHYDRAMINE HYDROCHLORIDE 25 MG: 50 INJECTION, SOLUTION INTRAMUSCULAR; INTRAVENOUS at 09:11

## 2021-12-01 ENCOUNTER — TELEPHONE (OUTPATIENT)
Dept: ENDOCRINOLOGY | Facility: CLINIC | Age: 66
End: 2021-12-01

## 2021-12-03 DIAGNOSIS — R13.19 ESOPHAGEAL DYSPHAGIA: ICD-10-CM

## 2021-12-03 DIAGNOSIS — K21.9 GASTROESOPHAGEAL REFLUX DISEASE WITHOUT ESOPHAGITIS: ICD-10-CM

## 2021-12-07 RX ORDER — PANTOPRAZOLE SODIUM 40 MG/1
TABLET, DELAYED RELEASE ORAL
Qty: 90 TABLET | Refills: 5 | Status: SHIPPED | OUTPATIENT
Start: 2021-12-07 | End: 2022-06-20 | Stop reason: SDUPTHER

## 2021-12-14 ENCOUNTER — HOSPITAL ENCOUNTER (OUTPATIENT)
Dept: INFUSION CENTER | Facility: CLINIC | Age: 66
Discharge: HOME | End: 2021-12-14
Payer: MEDICARE

## 2021-12-14 VITALS
HEART RATE: 73 BPM | TEMPERATURE: 98.1 F | RESPIRATION RATE: 16 BRPM | BODY MASS INDEX: 30.43 KG/M2 | SYSTOLIC BLOOD PRESSURE: 158 MMHG | DIASTOLIC BLOOD PRESSURE: 78 MMHG | WEIGHT: 230.6 LBS

## 2021-12-14 DIAGNOSIS — C91.10 CLL (CHRONIC LYMPHOCYTIC LEUKEMIA) (HCC): Primary | ICD-10-CM

## 2021-12-14 DIAGNOSIS — D80.1 HYPOGAMMAGLOBULINEMIA, ACQUIRED (HCC): ICD-10-CM

## 2021-12-14 PROCEDURE — 96366 THER/PROPH/DIAG IV INF ADDON: CPT

## 2021-12-14 PROCEDURE — 96375 TX/PRO/DX INJ NEW DRUG ADDON: CPT

## 2021-12-14 PROCEDURE — 96365 THER/PROPH/DIAG IV INF INIT: CPT

## 2021-12-14 PROCEDURE — 96367 TX/PROPH/DG ADDL SEQ IV INF: CPT

## 2021-12-14 RX ORDER — SODIUM CHLORIDE 9 MG/ML
20 INJECTION, SOLUTION INTRAVENOUS ONCE
Status: COMPLETED | OUTPATIENT
Start: 2021-12-14 | End: 2021-12-14

## 2021-12-14 RX ORDER — ACETAMINOPHEN 325 MG/1
650 TABLET ORAL ONCE
Status: CANCELLED | OUTPATIENT
Start: 2022-01-11

## 2021-12-14 RX ORDER — ACETAMINOPHEN 325 MG/1
650 TABLET ORAL ONCE
Status: COMPLETED | OUTPATIENT
Start: 2021-12-14 | End: 2021-12-14

## 2021-12-14 RX ORDER — SODIUM CHLORIDE 9 MG/ML
20 INJECTION, SOLUTION INTRAVENOUS ONCE
Status: CANCELLED | OUTPATIENT
Start: 2022-01-11

## 2021-12-14 RX ADMIN — ACETAMINOPHEN 650 MG: 325 TABLET, FILM COATED ORAL at 08:29

## 2021-12-14 RX ADMIN — SODIUM CHLORIDE 20 ML/HR: 0.9 INJECTION, SOLUTION INTRAVENOUS at 08:25

## 2021-12-14 RX ADMIN — DIPHENHYDRAMINE HYDROCHLORIDE 12.5 MG: 50 INJECTION, SOLUTION INTRAMUSCULAR; INTRAVENOUS at 08:26

## 2021-12-14 RX ADMIN — HYDROCORTISONE SODIUM SUCCINATE 100 MG: 100 INJECTION, POWDER, FOR SOLUTION INTRAMUSCULAR; INTRAVENOUS at 08:58

## 2021-12-14 RX ADMIN — Medication 20 G: at 09:12

## 2021-12-16 ENCOUNTER — TELEPHONE (OUTPATIENT)
Dept: OTHER | Facility: OTHER | Age: 66
End: 2021-12-16

## 2021-12-17 ENCOUNTER — TELEPHONE (OUTPATIENT)
Dept: ENDOCRINOLOGY | Facility: CLINIC | Age: 66
End: 2021-12-17

## 2021-12-17 ENCOUNTER — OFFICE VISIT (OUTPATIENT)
Dept: ENDOCRINOLOGY | Facility: CLINIC | Age: 66
End: 2021-12-17
Payer: MEDICARE

## 2021-12-17 VITALS
DIASTOLIC BLOOD PRESSURE: 78 MMHG | HEIGHT: 73 IN | HEART RATE: 78 BPM | WEIGHT: 225 LBS | SYSTOLIC BLOOD PRESSURE: 156 MMHG | BODY MASS INDEX: 29.82 KG/M2

## 2021-12-17 DIAGNOSIS — E11.9 TYPE 2 DIABETES MELLITUS WITHOUT COMPLICATION, WITH LONG-TERM CURRENT USE OF INSULIN (HCC): Primary | ICD-10-CM

## 2021-12-17 DIAGNOSIS — E78.2 MIXED HYPERLIPIDEMIA: ICD-10-CM

## 2021-12-17 DIAGNOSIS — I10 PRIMARY HYPERTENSION: ICD-10-CM

## 2021-12-17 DIAGNOSIS — Z79.4 TYPE 2 DIABETES MELLITUS WITHOUT COMPLICATION, WITH LONG-TERM CURRENT USE OF INSULIN (HCC): Primary | ICD-10-CM

## 2021-12-17 PROCEDURE — 99214 OFFICE O/P EST MOD 30 MIN: CPT | Performed by: PHYSICIAN ASSISTANT

## 2021-12-17 RX ORDER — INSULIN DEGLUDEC INJECTION 100 U/ML
15 INJECTION, SOLUTION SUBCUTANEOUS
Qty: 15 ML | Refills: 1
Start: 2021-12-17 | End: 2022-03-14 | Stop reason: SDUPTHER

## 2021-12-28 ENCOUNTER — HOSPITAL ENCOUNTER (OUTPATIENT)
Dept: INFUSION CENTER | Facility: CLINIC | Age: 66
Discharge: HOME | End: 2021-12-28
Payer: MEDICARE

## 2021-12-28 VITALS
DIASTOLIC BLOOD PRESSURE: 92 MMHG | SYSTOLIC BLOOD PRESSURE: 165 MMHG | HEIGHT: 73 IN | BODY MASS INDEX: 30.59 KG/M2 | HEART RATE: 73 BPM | RESPIRATION RATE: 18 BRPM | WEIGHT: 230.82 LBS | TEMPERATURE: 98.4 F

## 2021-12-28 DIAGNOSIS — C91.10 CLL (CHRONIC LYMPHOCYTIC LEUKEMIA) (HCC): Primary | ICD-10-CM

## 2021-12-28 LAB
ALBUMIN SERPL BCP-MCNC: 3.9 G/DL (ref 3.5–5)
ALP SERPL-CCNC: 75 U/L (ref 46–116)
ALT SERPL W P-5'-P-CCNC: 45 U/L (ref 12–78)
ANION GAP SERPL CALCULATED.3IONS-SCNC: 11 MMOL/L (ref 4–13)
AST SERPL W P-5'-P-CCNC: 33 U/L (ref 5–45)
BASOPHILS # BLD MANUAL: 0.06 THOUSAND/UL (ref 0–0.1)
BASOPHILS NFR MAR MANUAL: 1 % (ref 0–1)
BILIRUB SERPL-MCNC: 0.4 MG/DL (ref 0.2–1)
BUN SERPL-MCNC: 16 MG/DL (ref 5–25)
CALCIUM SERPL-MCNC: 8.5 MG/DL (ref 8.3–10.1)
CHLORIDE SERPL-SCNC: 108 MMOL/L (ref 100–108)
CO2 SERPL-SCNC: 26 MMOL/L (ref 21–32)
CREAT SERPL-MCNC: 1.14 MG/DL (ref 0.6–1.3)
EOSINOPHIL # BLD MANUAL: 0.23 THOUSAND/UL (ref 0–0.4)
EOSINOPHIL NFR BLD MANUAL: 4 % (ref 0–6)
ERYTHROCYTE [DISTWIDTH] IN BLOOD BY AUTOMATED COUNT: 13.7 % (ref 11.6–15.1)
GFR SERPL CREATININE-BSD FRML MDRD: 66 ML/MIN/1.73SQ M
GLUCOSE SERPL-MCNC: 189 MG/DL (ref 65–140)
HCT VFR BLD AUTO: 44.5 % (ref 36.5–49.3)
HGB BLD-MCNC: 14.6 G/DL (ref 12–17)
IGG SERPL-MCNC: 554 MG/DL (ref 700–1600)
LG PLATELETS BLD QL SMEAR: PRESENT
LYMPHOCYTES # BLD AUTO: 0.86 THOUSAND/UL (ref 0.6–4.47)
LYMPHOCYTES # BLD AUTO: 15 % (ref 14–44)
MCH RBC QN AUTO: 30.3 PG (ref 26.8–34.3)
MCHC RBC AUTO-ENTMCNC: 32.8 G/DL (ref 31.4–37.4)
MCV RBC AUTO: 92 FL (ref 82–98)
MONOCYTES # BLD AUTO: 0.63 THOUSAND/UL (ref 0–1.22)
MONOCYTES NFR BLD: 11 % (ref 4–12)
NEUTROPHILS # BLD MANUAL: 3.93 THOUSAND/UL (ref 1.85–7.62)
NEUTS SEG NFR BLD AUTO: 69 % (ref 43–75)
PLATELET # BLD AUTO: 161 THOUSANDS/UL (ref 149–390)
PLATELET BLD QL SMEAR: ADEQUATE
PMV BLD AUTO: 12 FL (ref 8.9–12.7)
POTASSIUM SERPL-SCNC: 4.2 MMOL/L (ref 3.5–5.3)
PROT SERPL-MCNC: 6.4 G/DL (ref 6.4–8.2)
RBC # BLD AUTO: 4.82 MILLION/UL (ref 3.88–5.62)
RBC MORPH BLD: NORMAL
RETICS # AUTO: ABNORMAL 10*3/UL (ref 14356–105094)
RETICS # CALC: 2.08 % (ref 0.37–1.87)
SODIUM SERPL-SCNC: 145 MMOL/L (ref 136–145)
WBC # BLD AUTO: 5.7 THOUSAND/UL (ref 4.31–10.16)

## 2021-12-28 PROCEDURE — 85045 AUTOMATED RETICULOCYTE COUNT: CPT | Performed by: INTERNAL MEDICINE

## 2021-12-28 PROCEDURE — 82784 ASSAY IGA/IGD/IGG/IGM EACH: CPT | Performed by: INTERNAL MEDICINE

## 2021-12-28 PROCEDURE — 85027 COMPLETE CBC AUTOMATED: CPT | Performed by: INTERNAL MEDICINE

## 2021-12-28 PROCEDURE — 96415 CHEMO IV INFUSION ADDL HR: CPT

## 2021-12-28 PROCEDURE — 96367 TX/PROPH/DG ADDL SEQ IV INF: CPT

## 2021-12-28 PROCEDURE — 96413 CHEMO IV INFUSION 1 HR: CPT

## 2021-12-28 PROCEDURE — 80053 COMPREHEN METABOLIC PANEL: CPT | Performed by: INTERNAL MEDICINE

## 2021-12-28 PROCEDURE — 85007 BL SMEAR W/DIFF WBC COUNT: CPT | Performed by: INTERNAL MEDICINE

## 2021-12-28 RX ORDER — ACETAMINOPHEN 325 MG/1
650 TABLET ORAL ONCE
Status: COMPLETED | OUTPATIENT
Start: 2021-12-28 | End: 2021-12-28

## 2021-12-28 RX ORDER — SODIUM CHLORIDE 9 MG/ML
20 INJECTION, SOLUTION INTRAVENOUS ONCE
Status: COMPLETED | OUTPATIENT
Start: 2021-12-28 | End: 2021-12-28

## 2021-12-28 RX ADMIN — ACETAMINOPHEN 650 MG: 325 TABLET, FILM COATED ORAL at 08:37

## 2021-12-28 RX ADMIN — OBINUTUZUMAB 1000 MG: 1000 INJECTION, SOLUTION, CONCENTRATE INTRAVENOUS at 09:29

## 2021-12-28 RX ADMIN — SODIUM CHLORIDE 20 ML/HR: 9 INJECTION, SOLUTION INTRAVENOUS at 08:40

## 2021-12-28 RX ADMIN — DIPHENHYDRAMINE HYDROCHLORIDE 25 MG: 50 INJECTION, SOLUTION INTRAMUSCULAR; INTRAVENOUS at 09:03

## 2021-12-28 RX ADMIN — DEXAMETHASONE SODIUM PHOSPHATE 20 MG: 10 INJECTION, SOLUTION INTRAMUSCULAR; INTRAVENOUS at 08:38

## 2021-12-28 NOTE — PROGRESS NOTES
Pt presents for gazyva infusion  Port accessed with excellent blood return  Labs obtained via port, no need to await results  No new meds or concerns

## 2022-01-03 DIAGNOSIS — R05.9 COUGH: ICD-10-CM

## 2022-01-03 DIAGNOSIS — R50.9 FEVER, UNSPECIFIED FEVER CAUSE: Primary | ICD-10-CM

## 2022-01-03 PROCEDURE — U0003 INFECTIOUS AGENT DETECTION BY NUCLEIC ACID (DNA OR RNA); SEVERE ACUTE RESPIRATORY SYNDROME CORONAVIRUS 2 (SARS-COV-2) (CORONAVIRUS DISEASE [COVID-19]), AMPLIFIED PROBE TECHNIQUE, MAKING USE OF HIGH THROUGHPUT TECHNOLOGIES AS DESCRIBED BY CMS-2020-01-R: HCPCS | Performed by: INTERNAL MEDICINE

## 2022-01-03 PROCEDURE — U0005 INFEC AGEN DETEC AMPLI PROBE: HCPCS | Performed by: INTERNAL MEDICINE

## 2022-01-05 ENCOUNTER — TELEMEDICINE (OUTPATIENT)
Dept: FAMILY MEDICINE CLINIC | Facility: CLINIC | Age: 67
End: 2022-01-05
Payer: MEDICARE

## 2022-01-05 DIAGNOSIS — J44.9 CHRONIC OBSTRUCTIVE PULMONARY DISEASE, UNSPECIFIED COPD TYPE (HCC): ICD-10-CM

## 2022-01-05 DIAGNOSIS — J20.9 ACUTE BRONCHITIS, UNSPECIFIED ORGANISM: Primary | ICD-10-CM

## 2022-01-05 DIAGNOSIS — R05.9 COUGH: ICD-10-CM

## 2022-01-05 PROCEDURE — 99214 OFFICE O/P EST MOD 30 MIN: CPT | Performed by: INTERNAL MEDICINE

## 2022-01-05 RX ORDER — BUDESONIDE 180 UG/1
1 AEROSOL, POWDER RESPIRATORY (INHALATION) 2 TIMES DAILY
Qty: 1 EACH | Refills: 1 | Status: SHIPPED | OUTPATIENT
Start: 2022-01-05 | End: 2022-01-31

## 2022-01-05 RX ORDER — BENZONATATE 200 MG/1
200 CAPSULE ORAL 3 TIMES DAILY PRN
Qty: 20 CAPSULE | Refills: 0 | Status: SHIPPED | OUTPATIENT
Start: 2022-01-05 | End: 2022-04-21 | Stop reason: SDUPTHER

## 2022-01-05 RX ORDER — GUAIFENESIN AND CODEINE PHOSPHATE 100; 10 MG/5ML; MG/5ML
5 SOLUTION ORAL 3 TIMES DAILY PRN
Qty: 236 ML | Refills: 0 | Status: SHIPPED | OUTPATIENT
Start: 2022-01-05

## 2022-01-05 RX ORDER — AMOXICILLIN AND CLAVULANATE POTASSIUM 875; 125 MG/1; MG/1
1 TABLET, FILM COATED ORAL EVERY 12 HOURS SCHEDULED
Qty: 20 TABLET | Refills: 0 | Status: SHIPPED | OUTPATIENT
Start: 2022-01-05 | End: 2022-01-15

## 2022-01-05 NOTE — PROGRESS NOTES
Virtual Regular Visit    Verification of patient location:    Patient is located in the following state in which I hold an active license [place]      Assessment/Plan:    Problem List Items Addressed This Visit        Respiratory    Chronic obstructive pulmonary disease ([address] )      Other Visit Diagnoses     Acute bronchitis, unspecified organism    -  Primary    Cough                   Reason for visit is   Chief Complaint   Patient presents with    Virtual Regular Visit      Assessment/Plan:          Problem List Items Addressed This Visit                 Respiratory      Chronic obstructive pulmonary disease (HCC)      Relevant Medications      amoxicillin-clavulanate (Augmentin) 875-125 mg per tablet      benzonatate (TESSALON) 200 MG capsule      guaifenesin-codeine (GUAIFENESIN AC) 100-10 MG/5ML liquid      budesonide (Pulmicort Flexhaler) 180 MCG/ACT inhaler                Other Visit Diagnoses      Acute bronchitis, unspecified organism    -  Primary     Relevant Medications     amoxicillin-clavulanate (Augmentin) 875-125 mg per tablet     benzonatate (TESSALON) 200 MG capsule     guaifenesin-codeine (GUAIFENESIN AC) 100-10 MG/5ML liquid     budesonide (Pulmicort Flexhaler) 180 MCG/ACT inhaler       I will start patient on above treatment  Close monitoring  Encouraged to check pulse oximeter at rest and with ambulation and any clinical deterioration warrants hospitalization  Patient will be re-evaluated on Friday           Encounter provider Sandor Vega MD    Provider located at [address] [place] [address] 264, [facility] [place] [address] [address]  [facility] [phone]      Recent Visits  No visits were found meeting these conditions    Showing recent visits within past 7 days and meeting all other requirements  Today's Visits  Date Type Provider Dept   01/05/22 Telemedicine Sandor Vega MD [facility] 93 today's visits and meeting all other requirements  Future Appointments  No visits were found meeting these conditions  Showing future appointments within next 150 days and meeting all other requirements       The patient was identified by name and date of birth  Noemi Canales was informed that this is a telemedicine visit and that the visit is being conducted through Telephone  My office door was closed  No one else was in the room  He acknowledged consent and understanding of privacy and security of the video platform  The patient has agreed to participate and understands they can discontinue the visit at any time  Patient is aware this is a billable service  Subjective  Noemi Canales is a 78 y o  male    Patient is being evaluated tele has a cough that has been lingering for several days now  He was tested for COVID which was negative  He has been coughing with thick mucus had a temperature couple of days ago but is normal   His pulse oximeter per his wife is 98%  He does report that he has been noticing wheezing  He has been using over-the-counter Mucinex without much improvement  Unfortunately video connection could not be made      HPI     Past Medical History:   Diagnosis Date    Anemia     Arthritis     CAD (coronary artery disease) 2004    Cellulitis of scalp     CKD (chronic kidney disease) stage 3, GFR 30-59 ml/min ([facility])     CLL (chronic lymphocytic leukemia) ([address] )     COPD (chronic obstructive pulmonary disease) ([address] )     Diabetes mellitus ([address] )     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    Dyslipidemia     Edema extremities     Esophageal ulcer     H/O blood clots     Hemolytic anemia (HCC)     Hiatal hernia     History of TIA (transient ischemic attack)     Hyperlipidemia     Hypertension     Listeria infection 2018    Multiple gastric ulcers     Myocardial infarction ([address] ) 2004    acute    Neutropenia (HCC)     Obese     Recurrent sinusitis     Sleep apnea     Thrombocytopenia (HCC)     Vertigo        Past Surgical History:   Procedure Laterality Date    ARTHROSCOPIC REPAIR ACL      lt knee    CARDIAC SURGERY      cardiac cath with stent    FOOT SURGERY      IR PORT CHECK  5/17/2019    KNEE ARTHROSCOPY      OTHER SURGICAL HISTORY      cardiac cath lesion 1, 1st adjunct treat device: stent    PORTACATH PLACEMENT      OR ESOPHAGOGASTRODUODENOSCOPY TRANSORAL DIAGNOSTIC N/A 10/5/2017    Procedure: ESOPHAGOGASTRODUODENOSCOPY (EGD) with bx;  Surgeon: Bella Alatorre DO;  [facility]; Service: Gastroenterology    OR ESOPHAGOGASTRODUODENOSCOPY TRANSORAL DIAGNOSTIC N/A 3/8/2018    Procedure: ESOPHAGOGASTRODUODENOSCOPY (EGD) with biopsy;  Surgeon: Bella Alatorre DO;  [facility]; Service: Gastroenterology    OR ESOPHAGOGASTRODUODENOSCOPY TRANSORAL DIAGNOSTIC N/A 6/20/2018    Procedure: ESOPHAGOGASTRODUODENOSCOPY (EGD) with Dilation;  Surgeon: Bella Alatorre DO;  [facility]; Service: Gastroenterology    OR ESOPHAGOGASTRODUODENOSCOPY TRANSORAL DIAGNOSTIC N/A 10/18/2018    Procedure: ESOPHAGOGASTRODUODENOSCOPY (EGD) with dilation;  Surgeon: Blayne Shukla MD;  [facility];   Service: Gastroenterology    OR ESOPHAGOSCOPY FLEXIBLE TRANSORAL WITH BIOPSY N/A 9/2/2016    Procedure: ESOPHAGOGASTRODUODENOSCOPY (EGD); ESOPHAGEAL DILATION; ESOPHAGEAL BIOPSY;  Surgeon: Janice Rosario MD;  [facility];  Service: Thoracic    TONSILLECTOMY      UPPER GASTROINTESTINAL ENDOSCOPY      x 6       Current Outpatient Medications   Medication Sig Dispense Refill    acyclovir (ZOVIRAX) 400 MG tablet TAKE 1 TABLET TWICE A DAY 60 tablet 11    amLODIPine (NORVASC) 10 mg tablet Take 1 tablet (10 mg total) by mouth daily 90 tablet 3    amoxicillin-clavulanate (Augmentin) 875-125 mg per tablet Take 1 tablet by mouth every 12 (twelve) hours for 10 days 20 tablet 0    aspirin 81 MG tablet Take 81 mg by mouth every other day Every other day       benzonatate (TESSALON) 200 MG capsule Take 1 capsule (200 mg total) by mouth 3 (three) times a day as needed for cough 20 capsule 0    budesonide (Pulmicort Flexhaler) 180 MCG/ACT inhaler Inhale 1 puff 2 (two) times a day Rinse mouth after use   1 each 1    citalopram (CeleXA) 40 mg tablet TAKE 1 TABLET DAILY 90 tablet 5    fenofibrate (TRICOR) 145 mg tablet TAKE 1 TABLET DAILY 90 tablet 5    folic acid (FOLVITE) 1 mg tablet Take 800 mcg by mouth daily       furosemide (LASIX) 40 mg tablet TAKE 1 TABLET DAILY 90 tablet 5    gabapentin (NEURONTIN) 600 MG tablet Take 1 tablet (600 mg total) by mouth daily 90 tablet 6    glucose monitoring kit (FREESTYLE) monitoring kit 1 each by Does not apply route as needed ( ) E 11 9 1 each 0    guaifenesin-codeine (GUAIFENESIN AC) 100-10 MG/5ML liquid Take 5 mL by mouth 3 (three) times a day as needed for cough 236 mL 0    Ibrutinib 140 MG TABS Take 140 mg by mouth daily 30 tablet 11    Ibrutinib 140 MG TABS Take 140 mg by mouth daily 30 tablet 6    insulin degludec Aubrie Spray FlexTouch) 100 units/mL injection pen Inject 15 Units under the skin daily at bedtime 15 mL 1    insulin lispro (HumaLOG) 100 units/mL injection pen Use 5-10 units before meals as needed for steroid induced hyperglycemia (Patient taking differently: Use 5-15 units before meals as needed for steroid induced hyperglycemia) 15 mL 0    Insulin Pen Needle (BD Pen Needle Inez U/F) 32G X 4 MM MISC Use 3 (three) times a day 300 each 1    Insulin Syringe-Needle U-100 30G X 1/2" 0 3 ML MISC by Does not apply route 2 (two) times a day 100 each 6    Lancets (FREESTYLE) lancets Use as instructed--test 2 times a day    E 11 9 100 each 0    LORazepam (ATIVAN) 0 5 mg tablet Take 1 tablet (0 5 mg total) by mouth daily as needed for anxiety 30 tablet 0    losartan (COZAAR) 100 MG tablet TAKE 1 TABLET DAILY 90 tablet 5    multivitamin (THERAGRAN) TABS Take 1 tablet by mouth daily      naloxone (NARCAN) 4 mg/0 1 mL nasal spray Administer 1 spray into a nostril  If no response after 2-3 minutes, give another dose in the other nostril using a new spray  (Patient not taking: Reported on 12/17/2021 ) 1 each 1    obinutuzumab (GAZYVA) 1000 mg/40 mL SOLN Infuse into a venous catheter      oxyCODONE (ROXICODONE) 10 MG TABS Take 1 tablet (10 mg total) by mouth every 6 (six) hours as needed for moderate pain For ongoing pain controlMax Daily Amount: 40 mg (Patient not taking: Reported on 12/17/2021 ) 90 tablet 0    pantoprazole (PROTONIX) 40 mg tablet TAKE 1 TABLET DAILY 90 tablet 5    Potassium (POTASSIMIN PO) Take 20 mEq by mouth daily        predniSONE 5 mg tablet TAKE 1 TABLET DAILY 90 tablet 5    sodium chloride, PF, 0 9 % 10 mL by Intracatheter route see administration instructions 10mL into port before and after ampicillin doses  0    Ventolin  (90 Base) MCG/ACT inhaler USE 2 INHALATIONS FOUR TIMES A DAY AS NEEDED FOR WHEEZING 36 g 5    zolpidem (AMBIEN) 5 mg tablet Take 1 tablet (5 mg total) by mouth daily at bedtime as needed for sleep (Patient not taking: Reported on 12/17/2021 ) 30 tablet 0     No current facility-administered medications for this visit  Allergies   Allergen Reactions    Heparin Other (See Comments)     Other reaction(s): Blood Disorders  clot    Macrolides And Ketolides Other (See Comments)     Interacts with other meds he is taking    Simvastatin Myalgia       Review of Systems    Video Exam  98%  There were no vitals filed for this visit  Physical Exam     I spent 30 minutes with patient today in which greater than 50% of the time was spent in counseling/coordination of care regarding Plan of care     It was my intent to perform this visit via video technology but the patient was not able to do a video connection so the visit was completed via audio telephone only  VIRTUAL VISIT DISCLAIMER      Darius Aleman verbally agrees to participate in [employer]   Pt is aware that Virtual Care Services could be limited without vital signs or the ability to perform a full hands-on physical exam  Peter A Aniades understands he or the provider may request at any time to terminate the video visit and request the patient to seek care or treatment in person

## 2022-01-05 NOTE — PROGRESS NOTES
Virtual Regular Visit    Verification of patient location:    Patient is located in the following state in which I hold an active license [place]      Assessment/Plan:    Problem List Items Addressed This Visit        Respiratory    Chronic obstructive pulmonary disease (HCC)    Relevant Medications    amoxicillin-clavulanate (Augmentin) 875-125 mg per tablet    benzonatate (TESSALON) 200 MG capsule    guaifenesin-codeine (GUAIFENESIN AC) 100-10 MG/5ML liquid    budesonide (Pulmicort Flexhaler) 180 MCG/ACT inhaler      Other Visit Diagnoses     Acute bronchitis, unspecified organism    -  Primary    Relevant Medications    amoxicillin-clavulanate (Augmentin) 875-125 mg per tablet    benzonatate (TESSALON) 200 MG capsule    guaifenesin-codeine (GUAIFENESIN AC) 100-10 MG/5ML liquid    budesonide (Pulmicort Flexhaler) 180 MCG/ACT inhaler      I will start patient on above treatment  Close monitoring  Encouraged to check pulse oximeter at rest and with ambulation and any clinical deterioration warrants hospitalization  Patient will be re-evaluated on Friday  Reason for visit is   Chief Complaint   Patient presents with    Virtual Regular Visit        Encounter provider Brian Garcia MD    Provider located at [address] [place] [address] 264, [address] [facility] , [address]  [facility] [phone]      Recent Visits  No visits were found meeting these conditions  Showing recent visits within past 7 days and meeting all other requirements  Future Appointments  No visits were found meeting these conditions  Showing future appointments within next 150 days and meeting all other requirements       The patient was identified by name and date of birth  Marcelo Zhang was informed that this is a telemedicine visit and that the visit is being conducted through Telephone  My office door was closed  No one else was in the room    He acknowledged consent and understanding of privacy and security of the video platform  The patient has agreed to participate and understands they can discontinue the visit at any time  Patient is aware this is a billable service  Subjective  Lilo Parson is a 78 y o  male    HPI   Patient is being evaluated tele has a cough that has been lingering for several days now  He was tested for COVID which was negative  He has been coughing with thick mucus had a temperature couple of days ago but is normal   His pulse oximeter per his wife is 98%  He does report that he has been noticing wheezing  He has been using over-the-counter Mucinex without much improvement  Unfortunately video connection could not be made    Past Medical History:   Diagnosis Date    Anemia     Arthritis     CAD (coronary artery disease) 2004    Cellulitis of scalp     CKD (chronic kidney disease) stage 3, GFR 30-59 ml/min ([facility])     CLL (chronic lymphocytic leukemia) ([facility])     COPD (chronic obstructive pulmonary disease) ([address] )     Diabetes mellitus ([facility] )     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    Dyslipidemia     Edema extremities     Esophageal ulcer     H/O blood clots     Hemolytic anemia (HCC)     Hiatal hernia     History of TIA (transient ischemic attack)     Hyperlipidemia     Hypertension     Listeria infection 2018    Multiple gastric ulcers     Myocardial infarction ([address] ) 2004    acute    Neutropenia (HCC)     Obese     Recurrent sinusitis     Sleep apnea     Thrombocytopenia (HCC)     Vertigo        Past Surgical History:   Procedure Laterality Date    ARTHROSCOPIC REPAIR ACL      lt knee    CARDIAC SURGERY      cardiac cath with stent    FOOT SURGERY      IR PORT CHECK  5/17/2019    KNEE ARTHROSCOPY      OTHER SURGICAL HISTORY      cardiac cath lesion 1, 1st adjunct treat device: stent    PORTACATH PLACEMENT      [place] ESOPHAGOGASTRODUODENOSCOPY TRANSORAL DIAGNOSTIC N/A 10/5/2017    Procedure: ESOPHAGOGASTRODUODENOSCOPY (EGD) with bx; Surgeon: Ashly Lutz DO;  [facility]; Service: Gastroenterology    [place] ESOPHAGOGASTRODUODENOSCOPY TRANSORAL DIAGNOSTIC N/A 3/8/2018    Procedure: ESOPHAGOGASTRODUODENOSCOPY (EGD) with biopsy;  Surgeon: Ashly Lutz DO;  [facility]; Service: Gastroenterology    ID ESOPHAGOGASTRODUODENOSCOPY TRANSORAL DIAGNOSTIC N/A 6/20/2018    Procedure: ESOPHAGOGASTRODUODENOSCOPY (EGD) with Dilation;  Surgeon: Ashly Lutz DO;  [facility]; Service: Gastroenterology    ID ESOPHAGOGASTRODUODENOSCOPY TRANSORAL DIAGNOSTIC N/A 10/18/2018    Procedure: ESOPHAGOGASTRODUODENOSCOPY (EGD) with dilation;  Surgeon: Tommie Holstein, MD;  [facility]; Service: Gastroenterology    ID ESOPHAGOSCOPY FLEXIBLE TRANSORAL WITH BIOPSY N/A 9/2/2016    Procedure: ESOPHAGOGASTRODUODENOSCOPY (EGD); ESOPHAGEAL DILATION; ESOPHAGEAL BIOPSY;  Surgeon: Bianca Esteban MD;  [facility];  Service: Thoracic    TONSILLECTOMY      UPPER GASTROINTESTINAL ENDOSCOPY      x 6       Current Outpatient Medications   Medication Sig Dispense Refill    acyclovir (ZOVIRAX) 400 MG tablet TAKE 1 TABLET TWICE A DAY 60 tablet 11    amLODIPine (NORVASC) 10 mg tablet Take 1 tablet (10 mg total) by mouth daily 90 tablet 3    amoxicillin-clavulanate (Augmentin) 875-125 mg per tablet Take 1 tablet by mouth every 12 (twelve) hours for 10 days 20 tablet 0    aspirin 81 MG tablet Take 81 mg by mouth every other day Every other day       benzonatate (TESSALON) 200 MG capsule Take 1 capsule (200 mg total) by mouth 3 (three) times a day as needed for cough 20 capsule 0    budesonide (Pulmicort Flexhaler) 180 MCG/ACT inhaler Inhale 1 puff 2 (two) times a day Rinse mouth after use   1 each 1    citalopram (CeleXA) 40 mg tablet TAKE 1 TABLET DAILY 90 tablet 5    fenofibrate (TRICOR) 145 mg tablet TAKE 1 TABLET DAILY 90 tablet 5    folic acid (FOLVITE) 1 mg tablet Take 800 mcg by mouth daily       furosemide (LASIX) 40 mg tablet TAKE 1 TABLET DAILY 90 tablet 5    gabapentin (NEURONTIN) 600 MG tablet Take 1 tablet (600 mg total) by mouth daily 90 tablet 6    glucose monitoring kit (FREESTYLE) monitoring kit 1 each by Does not apply route as needed ( ) E 11 9 1 each 0    guaifenesin-codeine (GUAIFENESIN AC) 100-10 MG/5ML liquid Take 5 mL by mouth 3 (three) times a day as needed for cough 236 mL 0    Ibrutinib 140 MG TABS Take 140 mg by mouth daily 30 tablet 11    Ibrutinib 140 MG TABS Take 140 mg by mouth daily 30 tablet 6    insulin degludec Jarold Sarathers FlexTouch) 100 units/mL injection pen Inject 15 Units under the skin daily at bedtime 15 mL 1    insulin lispro (HumaLOG) 100 units/mL injection pen Use 5-10 units before meals as needed for steroid induced hyperglycemia (Patient taking differently: Use 5-15 units before meals as needed for steroid induced hyperglycemia) 15 mL 0    Insulin Pen Needle (BD Pen Needle Inez U/F) 32G X 4 MM MISC Use 3 (three) times a day 300 each 1    Insulin Syringe-Needle U-100 30G X 1/2" 0 3 ML MISC by Does not apply route 2 (two) times a day 100 each 6    Lancets (FREESTYLE) lancets Use as instructed--test 2 times a day    E 11 9 100 each 0    LORazepam (ATIVAN) 0 5 mg tablet Take 1 tablet (0 5 mg total) by mouth daily as needed for anxiety 30 tablet 0    losartan (COZAAR) 100 MG tablet TAKE 1 TABLET DAILY 90 tablet 5    multivitamin (THERAGRAN) TABS Take 1 tablet by mouth daily      naloxone (NARCAN) 4 mg/0 1 mL nasal spray Administer 1 spray into a nostril  If no response after 2-3 minutes, give another dose in the other nostril using a new spray   (Patient not taking: Reported on 12/17/2021 ) 1 each 1    obinutuzumab (GAZYVA) 1000 mg/40 mL SOLN Infuse into a venous catheter      oxyCODONE (ROXICODONE) 10 MG TABS Take 1 tablet (10 mg total) by mouth every 6 (six) hours as needed for moderate pain For ongoing pain controlMax Daily Amount: 40 mg (Patient not taking: Reported on 12/17/2021 ) 90 tablet 0    pantoprazole (PROTONIX) 40 mg tablet TAKE 1 TABLET DAILY 90 tablet 5    Potassium (POTASSIMIN PO) Take 20 mEq by mouth daily        predniSONE 5 mg tablet TAKE 1 TABLET DAILY 90 tablet 5    sodium chloride, PF, 0 9 % 10 mL by Intracatheter route see administration instructions 10mL into port before and after ampicillin doses  0    Ventolin  (90 Base) MCG/ACT inhaler USE 2 INHALATIONS FOUR TIMES A DAY AS NEEDED FOR WHEEZING 36 g 5    zolpidem (AMBIEN) 5 mg tablet Take 1 tablet (5 mg total) by mouth daily at bedtime as needed for sleep (Patient not taking: Reported on 12/17/2021 ) 30 tablet 0     No current facility-administered medications for this visit  Allergies   Allergen Reactions    Heparin Other (See Comments)     Other reaction(s): Blood Disorders  clot    Macrolides And Ketolides Other (See Comments)     Interacts with other meds he is taking    Simvastatin Myalgia       Review of Systems    Video Exam    There were no vitals filed for this visit  Physical Exam   It was my intent to perform this visit via video technology but the patient was not able to do a video connection so the visit was completed via audio telephone only  I spent 20 minutes with patient today in which greater than 50% of the time was spent in counseling/coordination of care regarding Plan of care    VIRTUAL VISIT Petr Gambino verbally agrees to participate in [facility]  Pt is aware that [facility] could be limited without vital signs or the ability to perform a full hands-on physical exam  Peter MANDI Lucero understands he or the provider may request at any time to terminate the video visit and request the patient to seek care or treatment in person

## 2022-01-07 ENCOUNTER — TELEMEDICINE (OUTPATIENT)
Dept: FAMILY MEDICINE CLINIC | Facility: CLINIC | Age: 67
End: 2022-01-07
Payer: MEDICARE

## 2022-01-07 DIAGNOSIS — J44.9 CHRONIC OBSTRUCTIVE PULMONARY DISEASE, UNSPECIFIED COPD TYPE (HCC): ICD-10-CM

## 2022-01-07 DIAGNOSIS — J20.9 ACUTE BRONCHITIS, UNSPECIFIED ORGANISM: Primary | ICD-10-CM

## 2022-01-07 PROCEDURE — 99442 PR PHYS/QHP TELEPHONE EVALUATION 11-20 MIN: CPT | Performed by: INTERNAL MEDICINE

## 2022-01-07 NOTE — PROGRESS NOTES
Virtual Regular Visit    Verification of patient location:    Patient is located in the following state in which I hold an active license [place]      Assessment/Plan:    Problem List Items Addressed This Visit        Respiratory    Chronic obstructive pulmonary disease ([address] )      Other Visit Diagnoses     Acute bronchitis, unspecified organism    -  Primary      Patient has made a remarkable improvement with improvement of his cough and dyspnea  I do not feel at this point he needs to increase his prednisone  However I did instruct him to increase to 20 mg if over the weekend if he does not make any further progress  He would contact me on Monday  I would want him to come to the office in a month's if not sooner reassessment  Plan is to put him on a combination steroid inhaler  Reason for visit is   Chief Complaint   Patient presents with    COVID-19     neg    Cold Like Symptoms    Cough    Sore Throat    Shortness of Breath    Fatigue    Virtual Regular Visit        Encounter provider Fernande Rinne, MD    Provider located at [address] , [address] [place], [place] [phone]      Recent Visits  Date Type Provider Dept   01/05/22 Telemedicine Fernande Rinne, MD [address] recent visits within past 7 days and meeting all other requirements  Today's Visits  Date Type Provider Dept   01/07/22 Telemedicine Fernande Rinne, MD [address] today's visits and meeting all other requirements  Future Appointments  No visits were found meeting these conditions  Showing future appointments within next 150 days and meeting all other requirements       The patient was identified by name and date of birth  Michelleagnes Armendariz was informed that this is a telemedicine visit and that the visit is being conducted through Telephone  My office door was closed  No one else was in the room    He acknowledged consent and understanding of privacy and security of the video platform  The patient has agreed to participate and understands they can discontinue the visit at any time  Patient is aware this is a billable service  Subjective  Wayne Bonilla is a 78 y o  male   Patient is being evaluated tele health to evaluate for acute bronchitis and has start him on antibiotic steroid inhaler and cough medication  HPI     Past Medical History:   Diagnosis Date    Allergic     Anemia     Arthritis     CAD (coronary artery disease) 2004    Cellulitis of scalp     CKD (chronic kidney disease) stage 3, GFR 30-59 ml/min (HCC)     CLL (chronic lymphocytic leukemia) (HCC)     COPD (chronic obstructive pulmonary disease) ([address] )     Diabetes mellitus ([facility] )     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    Dyslipidemia     Edema extremities     Esophageal ulcer     H/O blood clots     Hemolytic anemia (HCC)     Hiatal hernia     History of TIA (transient ischemic attack)     Hyperlipidemia     Hypertension     Listeria infection 2018    Multiple gastric ulcers     Myocardial infarction ([facility] ) 2004    acute    Neutropenia (HCC)     Obese     Recurrent sinusitis     Sleep apnea     Thrombocytopenia (HCC)     Vertigo        Past Surgical History:   Procedure Laterality Date    ARTHROSCOPIC REPAIR ACL      lt knee    CARDIAC SURGERY      cardiac cath with stent    FOOT SURGERY      IR PORT CHECK  5/17/2019    KNEE ARTHROSCOPY      OTHER SURGICAL HISTORY      cardiac cath lesion 1, 1st adjunct treat device: stent    PORTACATH PLACEMENT      GA ESOPHAGOGASTRODUODENOSCOPY TRANSORAL DIAGNOSTIC N/A 10/5/2017    Procedure: ESOPHAGOGASTRODUODENOSCOPY (EGD) with bx;  Surgeon: Clement Doan DO;  [facility];   Service: Gastroenterology    GA ESOPHAGOGASTRODUODENOSCOPY TRANSORAL DIAGNOSTIC N/A 3/8/2018    Procedure: ESOPHAGOGASTRODUODENOSCOPY (EGD) with biopsy;  Surgeon: Clement Doan DO;  [facility]; Service: Gastroenterology    [place] ESOPHAGOGASTRODUODENOSCOPY TRANSORAL DIAGNOSTIC N/A 6/20/2018    Procedure: ESOPHAGOGASTRODUODENOSCOPY (EGD) with Dilation;  Surgeon: Gela Knutson DO;  [facility]; Service: Gastroenterology    [place] ESOPHAGOGASTRODUODENOSCOPY TRANSORAL DIAGNOSTIC N/A 10/18/2018    Procedure: ESOPHAGOGASTRODUODENOSCOPY (EGD) with dilation;  Surgeon: Harrold Buerger, MD;  [facility]; Service: Gastroenterology    [place] ESOPHAGOSCOPY FLEXIBLE TRANSORAL WITH BIOPSY N/A 9/2/2016    Procedure: ESOPHAGOGASTRODUODENOSCOPY (EGD); ESOPHAGEAL DILATION; ESOPHAGEAL BIOPSY;  Surgeon: Jose Eaton MD;  [facility];  Service: Thoracic    TONSILLECTOMY      UPPER GASTROINTESTINAL ENDOSCOPY      x 6       Current Outpatient Medications   Medication Sig Dispense Refill    acyclovir (ZOVIRAX) 400 MG tablet TAKE 1 TABLET TWICE A DAY 60 tablet 11    amLODIPine (NORVASC) 10 mg tablet Take 1 tablet (10 mg total) by mouth daily 90 tablet 3    amoxicillin-clavulanate (Augmentin) 875-125 mg per tablet Take 1 tablet by mouth every 12 (twelve) hours for 10 days 20 tablet 0    aspirin 81 MG tablet Take 81 mg by mouth every other day Every other day       benzonatate (TESSALON) 200 MG capsule Take 1 capsule (200 mg total) by mouth 3 (three) times a day as needed for cough 20 capsule 0    budesonide (Pulmicort Flexhaler) 180 MCG/ACT inhaler Inhale 1 puff 2 (two) times a day Rinse mouth after use   1 each 1    citalopram (CeleXA) 40 mg tablet TAKE 1 TABLET DAILY 90 tablet 5    fenofibrate (TRICOR) 145 mg tablet TAKE 1 TABLET DAILY 90 tablet 5    folic acid (FOLVITE) 1 mg tablet Take 800 mcg by mouth daily       furosemide (LASIX) 40 mg tablet TAKE 1 TABLET DAILY 90 tablet 5    gabapentin (NEURONTIN) 600 MG tablet Take 1 tablet (600 mg total) by mouth daily 90 tablet 6    glucose monitoring kit (FREESTYLE) monitoring kit 1 each by Does not apply route as needed ( ) E 11 9 1 each 0  guaifenesin-codeine (GUAIFENESIN AC) 100-10 MG/5ML liquid Take 5 mL by mouth 3 (three) times a day as needed for cough 236 mL 0    Ibrutinib 140 MG TABS Take 140 mg by mouth daily 30 tablet 11    Ibrutinib 140 MG TABS Take 140 mg by mouth daily 30 tablet 6    insulin degludec Ileana Back FlexTouch) 100 units/mL injection pen Inject 15 Units under the skin daily at bedtime 15 mL 1    insulin lispro (HumaLOG) 100 units/mL injection pen Use 5-10 units before meals as needed for steroid induced hyperglycemia (Patient taking differently: Use 5-15 units before meals as needed for steroid induced hyperglycemia) 15 mL 0    Insulin Pen Needle (BD Pen Needle Inez U/F) 32G X 4 MM MISC Use 3 (three) times a day 300 each 1    Insulin Syringe-Needle U-100 30G X 1/2" 0 3 ML MISC by Does not apply route 2 (two) times a day 100 each 6    Lancets (FREESTYLE) lancets Use as instructed--test 2 times a day    E 11 9 100 each 0    LORazepam (ATIVAN) 0 5 mg tablet Take 1 tablet (0 5 mg total) by mouth daily as needed for anxiety 30 tablet 0    losartan (COZAAR) 100 MG tablet TAKE 1 TABLET DAILY 90 tablet 5    multivitamin (THERAGRAN) TABS Take 1 tablet by mouth daily      naloxone (NARCAN) 4 mg/0 1 mL nasal spray Administer 1 spray into a nostril  If no response after 2-3 minutes, give another dose in the other nostril using a new spray   (Patient not taking: Reported on 12/17/2021 ) 1 each 1    obinutuzumab (GAZYVA) 1000 mg/40 mL SOLN Infuse into a venous catheter      oxyCODONE (ROXICODONE) 10 MG TABS Take 1 tablet (10 mg total) by mouth every 6 (six) hours as needed for moderate pain For ongoing pain controlMax Daily Amount: 40 mg (Patient not taking: Reported on 12/17/2021 ) 90 tablet 0    pantoprazole (PROTONIX) 40 mg tablet TAKE 1 TABLET DAILY 90 tablet 5    Potassium (POTASSIMIN PO) Take 20 mEq by mouth daily        predniSONE 5 mg tablet TAKE 1 TABLET DAILY 90 tablet 5    sodium chloride, PF, 0 9 % 10 mL by Intracatheter route see administration instructions 10mL into port before and after ampicillin doses  0    Ventolin  (90 Base) MCG/ACT inhaler USE 2 INHALATIONS FOUR TIMES A DAY AS NEEDED FOR WHEEZING 36 g 5    zolpidem (AMBIEN) 5 mg tablet Take 1 tablet (5 mg total) by mouth daily at bedtime as needed for sleep (Patient not taking: Reported on 12/17/2021 ) 30 tablet 0     No current facility-administered medications for this visit  Allergies   Allergen Reactions    Heparin Other (See Comments)     Other reaction(s): Blood Disorders  clot    Macrolides And Ketolides Other (See Comments)     Interacts with other meds he is taking    Simvastatin Myalgia       Review of Systems    Video Exam    There were no vitals filed for this visit  Physical Exam   See HPI above  I spent 20 minutes with patient today in which greater than 50% of the time was spent in counseling/coordination of care regarding Plan of care   It was my intent to perform this visit via video technology but the patient was not able to do a video connection so the visit was completed via audio telephone only  VIRTUAL VISIT DISCLAIMER      Shakira De La Rosa verbally agrees to participate in [employer]  Pt is aware that [employer] could be limited without vital signs or the ability to perform a full hands-on physical exam  Cayetano Lucero understands he or the provider may request at any time to terminate the video visit and request the patient to seek care or treatment in person

## 2022-01-09 DIAGNOSIS — J45.909 MODERATE ASTHMA, UNSPECIFIED WHETHER COMPLICATED, UNSPECIFIED WHETHER PERSISTENT: ICD-10-CM

## 2022-01-11 ENCOUNTER — HOSPITAL ENCOUNTER (OUTPATIENT)
Dept: INFUSION CENTER | Facility: CLINIC | Age: 67
Discharge: HOME | End: 2022-01-11
Payer: MEDICARE

## 2022-01-11 VITALS
HEART RATE: 82 BPM | BODY MASS INDEX: 29.55 KG/M2 | SYSTOLIC BLOOD PRESSURE: 130 MMHG | WEIGHT: 223.99 LBS | TEMPERATURE: 97.5 F | RESPIRATION RATE: 18 BRPM | DIASTOLIC BLOOD PRESSURE: 82 MMHG

## 2022-01-11 DIAGNOSIS — D80.1 HYPOGAMMAGLOBULINEMIA, ACQUIRED (HCC): ICD-10-CM

## 2022-01-11 DIAGNOSIS — C91.10 CLL (CHRONIC LYMPHOCYTIC LEUKEMIA) (HCC): Primary | ICD-10-CM

## 2022-01-11 PROCEDURE — 96365 THER/PROPH/DIAG IV INF INIT: CPT

## 2022-01-11 PROCEDURE — 96367 TX/PROPH/DG ADDL SEQ IV INF: CPT

## 2022-01-11 PROCEDURE — 96375 TX/PRO/DX INJ NEW DRUG ADDON: CPT

## 2022-01-11 PROCEDURE — 96366 THER/PROPH/DIAG IV INF ADDON: CPT

## 2022-01-11 RX ORDER — SODIUM CHLORIDE 9 MG/ML
20 INJECTION, SOLUTION INTRAVENOUS ONCE
Status: CANCELLED | OUTPATIENT
Start: 2022-02-08

## 2022-01-11 RX ORDER — SODIUM CHLORIDE 9 MG/ML
20 INJECTION, SOLUTION INTRAVENOUS ONCE
Status: COMPLETED | OUTPATIENT
Start: 2022-01-11 | End: 2022-01-11

## 2022-01-11 RX ORDER — ACETAMINOPHEN 325 MG/1
650 TABLET ORAL ONCE
Status: COMPLETED | OUTPATIENT
Start: 2022-01-11 | End: 2022-01-11

## 2022-01-11 RX ORDER — ACETAMINOPHEN 325 MG/1
650 TABLET ORAL ONCE
Status: CANCELLED | OUTPATIENT
Start: 2022-02-08

## 2022-01-11 RX ADMIN — HYDROCORTISONE SODIUM SUCCINATE 100 MG: 100 INJECTION, POWDER, FOR SOLUTION INTRAMUSCULAR; INTRAVENOUS at 08:32

## 2022-01-11 RX ADMIN — ACETAMINOPHEN 650 MG: 325 TABLET, FILM COATED ORAL at 08:32

## 2022-01-11 RX ADMIN — SODIUM CHLORIDE 20 ML/HR: 0.9 INJECTION, SOLUTION INTRAVENOUS at 08:33

## 2022-01-11 RX ADMIN — Medication 20 G: at 09:01

## 2022-01-11 RX ADMIN — DIPHENHYDRAMINE HYDROCHLORIDE 12.5 MG: 50 INJECTION, SOLUTION INTRAMUSCULAR; INTRAVENOUS at 08:35

## 2022-01-19 DIAGNOSIS — Z79.4 TYPE 2 DIABETES MELLITUS WITHOUT COMPLICATION, WITH LONG-TERM CURRENT USE OF INSULIN (HCC): Primary | ICD-10-CM

## 2022-01-19 DIAGNOSIS — E11.9 TYPE 2 DIABETES MELLITUS WITHOUT COMPLICATION, WITH LONG-TERM CURRENT USE OF INSULIN (HCC): Primary | ICD-10-CM

## 2022-01-24 ENCOUNTER — TELEPHONE (OUTPATIENT)
Dept: GYNECOLOGIC ONCOLOGY | Facility: CLINIC | Age: 67
End: 2022-01-24

## 2022-01-24 NOTE — TELEPHONE ENCOUNTER
Returned telephone call spoke with Pt  He is asking how much Prednisone he should be taking  Med Ryan Wallace has him taking prednisone 5 mg daily and PCP Dr Cely Marks rx'd tapering dose as he had the flu  Instructed to take as Dr Cely Marks rx but hold all the oral prednisone tomorrow as he will be getting 20 mg IV with his Gazyva infusion tomorrow  He will then finish as rx'd by Dr Cely Marks and when finished will go back to taking the 5 mg daily  Pt states he understands and that he is feeling much better and wants to get tx tomorrow as scheduled  He was concerned about taking too much Prednisone because he has had bad headaches in the past from taking too much

## 2022-01-24 NOTE — TELEPHONE ENCOUNTER
Patient has to takes mediations this morning and he has questions on what he should take and what amount      Best # [phone]

## 2022-01-25 ENCOUNTER — HOSPITAL ENCOUNTER (OUTPATIENT)
Dept: INFUSION CENTER | Facility: CLINIC | Age: 67
Discharge: HOME | End: 2022-01-25
Payer: MEDICARE

## 2022-01-25 DIAGNOSIS — C91.10 CLL (CHRONIC LYMPHOCYTIC LEUKEMIA) (HCC): Primary | ICD-10-CM

## 2022-01-25 LAB
ALBUMIN SERPL BCP-MCNC: 3.6 G/DL (ref 3.5–5)
ALP SERPL-CCNC: 55 U/L (ref 46–116)
ALT SERPL W P-5'-P-CCNC: 28 U/L (ref 12–78)
ANION GAP SERPL CALCULATED.3IONS-SCNC: 7 MMOL/L (ref 4–13)
AST SERPL W P-5'-P-CCNC: 19 U/L (ref 5–45)
BASOPHILS # BLD MANUAL: 0.06 THOUSAND/UL (ref 0–0.1)
BASOPHILS NFR MAR MANUAL: 1 % (ref 0–1)
BILIRUB SERPL-MCNC: 0.32 MG/DL (ref 0.2–1)
BUN SERPL-MCNC: 19 MG/DL (ref 5–25)
CALCIUM SERPL-MCNC: 8.7 MG/DL (ref 8.3–10.1)
CHLORIDE SERPL-SCNC: 105 MMOL/L (ref 100–108)
CO2 SERPL-SCNC: 29 MMOL/L (ref 21–32)
CREAT SERPL-MCNC: 1.16 MG/DL (ref 0.6–1.3)
EOSINOPHIL # BLD MANUAL: 0.17 THOUSAND/UL (ref 0–0.4)
EOSINOPHIL NFR BLD MANUAL: 3 % (ref 0–6)
ERYTHROCYTE [DISTWIDTH] IN BLOOD BY AUTOMATED COUNT: 13.2 % (ref 11.6–15.1)
GFR SERPL CREATININE-BSD FRML MDRD: 64 ML/MIN/1.73SQ M
GLUCOSE SERPL-MCNC: 132 MG/DL (ref 65–140)
HCT VFR BLD AUTO: 42 % (ref 36.5–49.3)
HGB BLD-MCNC: 14 G/DL (ref 12–17)
IGG SERPL-MCNC: 533 MG/DL (ref 700–1600)
LYMPHOCYTES # BLD AUTO: 0.8 THOUSAND/UL (ref 0.6–4.47)
LYMPHOCYTES # BLD AUTO: 14 % (ref 14–44)
MCH RBC QN AUTO: 30.8 PG (ref 26.8–34.3)
MCHC RBC AUTO-ENTMCNC: 33.3 G/DL (ref 31.4–37.4)
MCV RBC AUTO: 93 FL (ref 82–98)
MONOCYTES # BLD AUTO: 0.4 THOUSAND/UL (ref 0–1.22)
MONOCYTES NFR BLD: 7 % (ref 4–12)
NEUTROPHILS # BLD MANUAL: 4.27 THOUSAND/UL (ref 1.85–7.62)
NEUTS BAND NFR BLD MANUAL: 1 % (ref 0–8)
NEUTS SEG NFR BLD AUTO: 74 % (ref 43–75)
PLATELET # BLD AUTO: 179 THOUSANDS/UL (ref 149–390)
PLATELET BLD QL SMEAR: ADEQUATE
PMV BLD AUTO: 11.5 FL (ref 8.9–12.7)
POTASSIUM SERPL-SCNC: 3.8 MMOL/L (ref 3.5–5.3)
PROT SERPL-MCNC: 6.5 G/DL (ref 6.4–8.2)
RBC # BLD AUTO: 4.54 MILLION/UL (ref 3.88–5.62)
RBC MORPH BLD: NORMAL
RETICS # AUTO: NORMAL 10*3/UL (ref 14356–105094)
RETICS # CALC: 1.83 % (ref 0.37–1.87)
SODIUM SERPL-SCNC: 141 MMOL/L (ref 136–145)
WBC # BLD AUTO: 5.69 THOUSAND/UL (ref 4.31–10.16)

## 2022-01-25 PROCEDURE — 85025 COMPLETE CBC W/AUTO DIFF WBC: CPT | Performed by: INTERNAL MEDICINE

## 2022-01-25 PROCEDURE — 96367 TX/PROPH/DG ADDL SEQ IV INF: CPT

## 2022-01-25 PROCEDURE — 82784 ASSAY IGA/IGD/IGG/IGM EACH: CPT | Performed by: INTERNAL MEDICINE

## 2022-01-25 PROCEDURE — 96413 CHEMO IV INFUSION 1 HR: CPT

## 2022-01-25 PROCEDURE — 85027 COMPLETE CBC AUTOMATED: CPT | Performed by: INTERNAL MEDICINE

## 2022-01-25 PROCEDURE — 85007 BL SMEAR W/DIFF WBC COUNT: CPT | Performed by: INTERNAL MEDICINE

## 2022-01-25 PROCEDURE — 80053 COMPREHEN METABOLIC PANEL: CPT | Performed by: INTERNAL MEDICINE

## 2022-01-25 PROCEDURE — 85045 AUTOMATED RETICULOCYTE COUNT: CPT | Performed by: INTERNAL MEDICINE

## 2022-01-25 RX ORDER — ACETAMINOPHEN 325 MG/1
650 TABLET ORAL ONCE
Status: COMPLETED | OUTPATIENT
Start: 2022-01-25 | End: 2022-01-25

## 2022-01-25 RX ORDER — SODIUM CHLORIDE 9 MG/ML
20 INJECTION, SOLUTION INTRAVENOUS ONCE
Status: COMPLETED | OUTPATIENT
Start: 2022-01-25 | End: 2022-01-25

## 2022-01-25 RX ADMIN — OBINUTUZUMAB 1000 MG: 1000 INJECTION, SOLUTION, CONCENTRATE INTRAVENOUS at 09:57

## 2022-01-25 RX ADMIN — SODIUM CHLORIDE 20 ML/HR: 0.9 INJECTION, SOLUTION INTRAVENOUS at 08:44

## 2022-01-25 RX ADMIN — ACETAMINOPHEN 650 MG: 325 TABLET, FILM COATED ORAL at 08:41

## 2022-01-25 RX ADMIN — DEXAMETHASONE SODIUM PHOSPHATE 20 MG: 10 INJECTION, SOLUTION INTRAMUSCULAR; INTRAVENOUS at 08:44

## 2022-01-25 RX ADMIN — DIPHENHYDRAMINE HYDROCHLORIDE 25 MG: 50 INJECTION, SOLUTION INTRAMUSCULAR; INTRAVENOUS at 09:04

## 2022-01-25 NOTE — PROGRESS NOTES
Pt arrived to unit without complaint  Pt had received all premeds and 51 4 cc of his Gazyva infusion when the infusion had to be stopped d/t family emergency for the pt  Pt left unit in stable condition  Dr Chaim Philip notified via [employer] of situation

## 2022-01-26 ENCOUNTER — PATIENT OUTREACH (OUTPATIENT)
Dept: CASE MANAGEMENT | Facility: OTHER | Age: 67
End: 2022-01-26

## 2022-01-26 DIAGNOSIS — Z78.9 NEED FOR FOLLOW-UP BY SOCIAL WORKER: Primary | ICD-10-CM

## 2022-01-28 ENCOUNTER — PATIENT OUTREACH (OUTPATIENT)
Dept: CASE MANAGEMENT | Facility: OTHER | Age: 67
End: 2022-01-28

## 2022-01-28 NOTE — PROGRESS NOTES
OSW received referral from infusion staff  Mr Moncho Whitley was receiving chemotherapy on  when he got a call from family that his only son  by suicide  He immediately left to go home  Called pt today and offered condolences  Cosme Peraza stated he is devastated by this and is worried about his wife who is expressing guilt over their son's death  He shared his family and friends have been extremely supportive  He shared he was surprised to see how many lives his son touched, as hundreds of his friends and fans have reached out since hearing of his death  He explained his son was a musician and played at several local clubs  He stated his son also had many issues in his life  He was his only child  OSW explained there are resources available for bereavement and offered support  He asked if this writer could text him to check in and he will see how things go  Offered to text him next week and he is agreeable    He expressed appreciation to infusion staff, stating "they are like a second family to me "

## 2022-01-31 ENCOUNTER — APPOINTMENT (OUTPATIENT)
Dept: RADIOLOGY | Facility: CLINIC | Age: 67
End: 2022-01-31
Payer: MEDICARE

## 2022-01-31 ENCOUNTER — TELEPHONE (OUTPATIENT)
Dept: FAMILY MEDICINE CLINIC | Facility: CLINIC | Age: 67
End: 2022-01-31

## 2022-01-31 ENCOUNTER — TELEMEDICINE (OUTPATIENT)
Dept: FAMILY MEDICINE CLINIC | Facility: CLINIC | Age: 67
End: 2022-01-31
Payer: MEDICARE

## 2022-01-31 VITALS — BODY MASS INDEX: 29.55 KG/M2 | WEIGHT: 223 LBS | HEIGHT: 73 IN

## 2022-01-31 DIAGNOSIS — F41.9 ANXIETY DISORDER, UNSPECIFIED TYPE: ICD-10-CM

## 2022-01-31 DIAGNOSIS — J20.9 ACUTE BRONCHITIS, UNSPECIFIED ORGANISM: ICD-10-CM

## 2022-01-31 DIAGNOSIS — J44.9 CHRONIC OBSTRUCTIVE PULMONARY DISEASE, UNSPECIFIED COPD TYPE (HCC): Primary | ICD-10-CM

## 2022-01-31 DIAGNOSIS — R05.9 COUGH: ICD-10-CM

## 2022-01-31 DIAGNOSIS — J44.9 CHRONIC OBSTRUCTIVE PULMONARY DISEASE, UNSPECIFIED COPD TYPE (HCC): ICD-10-CM

## 2022-01-31 PROCEDURE — 99442 PR PHYS/QHP TELEPHONE EVALUATION 11-20 MIN: CPT | Performed by: INTERNAL MEDICINE

## 2022-01-31 PROCEDURE — 71046 X-RAY EXAM CHEST 2 VIEWS: CPT

## 2022-01-31 RX ORDER — ALBUTEROL SULFATE 2.5 MG/3ML
2.5 SOLUTION RESPIRATORY (INHALATION) EVERY 6 HOURS PRN
Qty: 180 ML | Refills: 5 | Status: SHIPPED | OUTPATIENT
Start: 2022-01-31

## 2022-01-31 RX ORDER — LORAZEPAM 0.5 MG/1
0.5 TABLET ORAL DAILY PRN
Qty: 30 TABLET | Refills: 0 | Status: SHIPPED | OUTPATIENT
Start: 2022-01-31 | End: 2022-02-27 | Stop reason: SDUPTHER

## 2022-01-31 RX ORDER — MOMETASONE FUROATE AND FORMOTEROL FUMARATE DIHYDRATE 200; 5 UG/1; UG/1
2 AEROSOL RESPIRATORY (INHALATION) 2 TIMES DAILY
Qty: 13 G | Refills: 1 | Status: SHIPPED | OUTPATIENT
Start: 2022-01-31 | End: 2022-02-25 | Stop reason: SDUPTHER

## 2022-01-31 RX ORDER — LEVOFLOXACIN 500 MG/1
500 TABLET, FILM COATED ORAL EVERY 24 HOURS
Qty: 5 TABLET | Refills: 0 | Status: SHIPPED | OUTPATIENT
Start: 2022-01-31 | End: 2022-02-05

## 2022-01-31 NOTE — PROGRESS NOTES
Virtual Regular Visit    Verification of patient location:    Patient is located in the following state in which I hold an active license [place]      Assessment/Plan:    Problem List Items Addressed This Visit        Respiratory    Chronic obstructive pulmonary disease ([address] ) - Primary    Relevant Medications    mometasone-formoterol (Dulera) 200-5 MCG/ACT inhaler    albuterol (2 5 mg/3 mL) 0 083 % nebulizer solution    Other Relevant Orders    Nebulizer    Ambulatory Referral to Pulmonology      Other Visit Diagnoses     Cough        Relevant Medications    levofloxacin (LEVAQUIN) 500 mg tablet    Acute bronchitis, unspecified organism        Relevant Medications    mometasone-formoterol (Dulera) 200-5 MCG/ACT inhaler    albuterol (2 5 mg/3 mL) 0 083 % nebulizer solution    levofloxacin (LEVAQUIN) 500 mg tablet               Reason for visit is   Chief Complaint   Patient presents with    Cough     coughing up green sputum     Nasal Congestion    Sore Throat    Headache    Virtual Regular Visit        Encounter provider Clay Adams MD    Provider located at [address] [place] [address], [facility] , [address] [facility] [address] [place] [id number]      Recent Visits  No visits were found meeting these conditions  Showing recent visits within past 7 days and meeting all other requirements  Today's Visits  Date Type Provider Dept   01/31/22 Telephone [facility]   01/31/22 Telemedicine Clay Adams MD [place] [id number] today's visits and meeting all other requirements  Future Appointments  No visits were found meeting these conditions  Showing future appointments within next 150 days and meeting all other requirements       The patient was identified by name and date of birth  DANIALula Dance was informed that this is a telemedicine visit and that the visit is being conducted through Telephone    My office door was closed  No one else was in the room  He acknowledged consent and understanding of privacy and security of the video platform  The patient has agreed to participate and understands they can discontinue the visit at any time  Patient is aware this is a billable service  Subjective  Baljinder Saavedra is a 78 y o  male    Patient is evaluated through tele health to discuss ongoing cough  He reports for the last couple of days his cough is getting was congestion bringing up some green phlegm feeling tightness in the chest   He just finished his high dose prednisone  He is almost out of his pulmicort inhaler  He requests nebulizer which I agree  I would call in nebulizer machine with albuterol inhaler  We will switch him over to combination inhaler Dulera covered by his insurance  Would also place a consult for Pulmonary  Chest x-ray order was placed last week which I would encourage the patient to go for  I am also going to start him on antibiotic  Close follow-up in 2 days  If no improvement start prednisone  Patient last his son last week and has been under lot of stress    HPI     Past Medical History:   Diagnosis Date    Allergic     Anemia     Arthritis     CAD (coronary artery disease) 2004    Cellulitis of scalp     CKD (chronic kidney disease) stage 3, GFR 30-59 ml/min (HCC)     CLL (chronic lymphocytic leukemia) ([address] )     COPD (chronic obstructive pulmonary disease) ([address] )     Diabetes mellitus ([address] )     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    Dyslipidemia     Edema extremities     Esophageal ulcer     H/O blood clots     Hemolytic anemia (HCC)     Hiatal hernia     History of TIA (transient ischemic attack)     Hyperlipidemia     Hypertension     Listeria infection 2018    Multiple gastric ulcers     Myocardial infarction ([address] ) 2004    acute    Neutropenia (HCC)     Obese     Recurrent sinusitis     Sleep apnea     Thrombocytopenia (HCC)     Vertigo        Past Surgical History: Procedure Laterality Date    ARTHROSCOPIC REPAIR ACL      lt knee    CARDIAC SURGERY      cardiac cath with stent    FOOT SURGERY      IR PORT CHECK  5/17/2019    KNEE ARTHROSCOPY      OTHER SURGICAL HISTORY      cardiac cath lesion 1, 1st adjunct treat device: stent    PORTACATH PLACEMENT      [place] ESOPHAGOGASTRODUODENOSCOPY TRANSORAL DIAGNOSTIC N/A 10/5/2017    Procedure: ESOPHAGOGASTRODUODENOSCOPY (EGD) with bx;  Surgeon: Yvon Fox DO;  [facility]; Service: Gastroenterology    AL ESOPHAGOGASTRODUODENOSCOPY TRANSORAL DIAGNOSTIC N/A 3/8/2018    Procedure: ESOPHAGOGASTRODUODENOSCOPY (EGD) with biopsy;  Surgeon: Yvon Fox DO;  [facility]; Service: Gastroenterology    AL ESOPHAGOGASTRODUODENOSCOPY TRANSORAL DIAGNOSTIC N/A 6/20/2018    Procedure: ESOPHAGOGASTRODUODENOSCOPY (EGD) with Dilation;  Surgeon: Yvon Fox DO;  [facility]; Service: Gastroenterology    AL ESOPHAGOGASTRODUODENOSCOPY TRANSORAL DIAGNOSTIC N/A 10/18/2018    Procedure: ESOPHAGOGASTRODUODENOSCOPY (EGD) with dilation;  Surgeon: Milton Grubbs MD;  [facility];   Service: Gastroenterology    AL ESOPHAGOSCOPY FLEXIBLE TRANSORAL WITH BIOPSY N/A 9/2/2016    Procedure: ESOPHAGOGASTRODUODENOSCOPY (EGD); ESOPHAGEAL DILATION; ESOPHAGEAL BIOPSY;  Surgeon: Nuvia Villalobos MD;  [facility];  Service: Thoracic    TONSILLECTOMY      UPPER GASTROINTESTINAL ENDOSCOPY      x 6       Current Outpatient Medications   Medication Sig Dispense Refill    acyclovir (ZOVIRAX) 400 MG tablet TAKE 1 TABLET TWICE A DAY 60 tablet 11    amLODIPine (NORVASC) 10 mg tablet Take 1 tablet (10 mg total) by mouth daily 90 tablet 3    aspirin 81 MG tablet Take 81 mg by mouth every other day Every other day       benzonatate (TESSALON) 200 MG capsule Take 1 capsule (200 mg total) by mouth 3 (three) times a day as needed for cough 20 capsule 0    citalopram (CeleXA) 40 mg tablet TAKE 1 TABLET DAILY 90 tablet 5    fenofibrate (TRICOR) 145 mg tablet TAKE 1 TABLET DAILY 90 tablet 5    folic acid (FOLVITE) 1 mg tablet Take 800 mcg by mouth daily       furosemide (LASIX) 40 mg tablet TAKE 1 TABLET DAILY 90 tablet 5    gabapentin (NEURONTIN) 600 MG tablet Take 1 tablet (600 mg total) by mouth daily 90 tablet 6    glucose monitoring kit (FREESTYLE) monitoring kit 1 each by Does not apply route as needed ( ) E 11 9 1 each 0    guaifenesin-codeine (GUAIFENESIN AC) 100-10 MG/5ML liquid Take 5 mL by mouth 3 (three) times a day as needed for cough 236 mL 0    Ibrutinib 140 MG TABS Take 140 mg by mouth daily 30 tablet 11    Ibrutinib 140 MG TABS Take 140 mg by mouth daily 30 tablet 6    insulin degludec Tim Jolley FlexTouch) 100 units/mL injection pen Inject 15 Units under the skin daily at bedtime 15 mL 1    insulin lispro (HumaLOG) 100 units/mL injection pen Use 5-10 units before meals as needed for steroid induced hyperglycemia (Patient taking differently: Use 5-15 units before meals as needed for steroid induced hyperglycemia) 15 mL 0    Insulin Pen Needle (BD Pen Needle Inez U/F) 32G X 4 MM MISC Use 3 (three) times a day 300 each 1    Insulin Syringe-Needle U-100 30G X 1/2" 0 3 ML MISC by Does not apply route 2 (two) times a day 100 each 6    Lancets (FREESTYLE) lancets Use as instructed--test 2 times a day    E 11 9 100 each 0    LORazepam (ATIVAN) 0 5 mg tablet Take 1 tablet (0 5 mg total) by mouth daily as needed for anxiety 30 tablet 0    losartan (COZAAR) 100 MG tablet TAKE 1 TABLET DAILY 90 tablet 5    multivitamin (THERAGRAN) TABS Take 1 tablet by mouth daily      nystatin (MYCOSTATIN) 500,000 units/5 mL suspension Apply 2 mL (200,000 Units total) to the mouth or throat 4 (four) times a day 473 mL 0    obinutuzumab (GAZYVA) 1000 mg/40 mL SOLN Infuse into a venous catheter      pantoprazole (PROTONIX) 40 mg tablet TAKE 1 TABLET DAILY 90 tablet 5    Potassium (POTASSIMIN PO) Take 20 mEq by mouth daily        predniSONE 20 mg tablet 20 mg one pill a day for 3 days then 10 mg po a day for 3 days then 5 mg po daily for 3 days 20 tablet 0    predniSONE 5 mg tablet TAKE 1 TABLET DAILY 90 tablet 5    sodium chloride, PF, 0 9 % 10 mL by Intracatheter route see administration instructions 10mL into port before and after ampicillin doses  0    Ventolin  (90 Base) MCG/ACT inhaler Inhale 1 puff 4 (four) times a day 36 g 5    albuterol (2 5 mg/3 mL) 0 083 % nebulizer solution Take 3 mL (2 5 mg total) by nebulization every 6 (six) hours as needed for wheezing or shortness of breath 180 mL 5    levofloxacin (LEVAQUIN) 500 mg tablet Take 1 tablet (500 mg total) by mouth every 24 hours for 5 days 5 tablet 0    mometasone-formoterol (Dulera) 200-5 MCG/ACT inhaler Inhale 2 puffs 2 (two) times a day Rinse mouth after use  13 g 1    naloxone (NARCAN) 4 mg/0 1 mL nasal spray Administer 1 spray into a nostril  If no response after 2-3 minutes, give another dose in the other nostril using a new spray  (Patient not taking: Reported on 12/17/2021 ) 1 each 1    oxyCODONE (ROXICODONE) 10 MG TABS Take 1 tablet (10 mg total) by mouth every 6 (six) hours as needed for moderate pain For ongoing pain controlMax Daily Amount: 40 mg (Patient not taking: Reported on 12/17/2021 ) 90 tablet 0    zolpidem (AMBIEN) 5 mg tablet Take 1 tablet (5 mg total) by mouth daily at bedtime as needed for sleep (Patient not taking: Reported on 12/17/2021 ) 30 tablet 0     No current facility-administered medications for this visit          Allergies   Allergen Reactions    Heparin Other (See Comments)     Other reaction(s): Blood Disorders  clot    Macrolides And Ketolides Other (See Comments)     Interacts with other meds he is taking    Simvastatin Myalgia       Review of Systems    Video Exam    Vitals:    01/31/22 1049   Weight: 101 kg (223 lb)   Height: 6' 1" (1 854 m)       Physical Exam   It was my intent to perform this visit via video technology but the patient was not able to do a video connection so the visit was completed via audio telephone only  However patient sounded congested  Repeated bouts of cough  Denied appear to be short of breath  I spent 15 minutes with patient today in which greater than 50% of the time was spent in counseling/coordination of care regarding Plan of care    VIRTUAL VISIT Petr Gambino verbally agrees to participate in [employer]  Pt is aware that [employer] could be limited without vital signs or the ability to perform a full hands-on physical exam  Cayetano Lucero understands he or the provider may request at any time to terminate the video visit and request the patient to seek care or treatment in person

## 2022-02-02 ENCOUNTER — OFFICE VISIT (OUTPATIENT)
Dept: FAMILY MEDICINE CLINIC | Facility: CLINIC | Age: 67
End: 2022-02-02
Payer: MEDICARE

## 2022-02-02 ENCOUNTER — TELEPHONE (OUTPATIENT)
Dept: PULMONOLOGY | Facility: CLINIC | Age: 67
End: 2022-02-02

## 2022-02-02 VITALS
OXYGEN SATURATION: 95 % | HEART RATE: 82 BPM | HEIGHT: 73 IN | DIASTOLIC BLOOD PRESSURE: 80 MMHG | BODY MASS INDEX: 28.97 KG/M2 | SYSTOLIC BLOOD PRESSURE: 136 MMHG | TEMPERATURE: 98.2 F | WEIGHT: 218.6 LBS

## 2022-02-02 DIAGNOSIS — F43.21 GRIEF AT LOSS OF CHILD: ICD-10-CM

## 2022-02-02 DIAGNOSIS — Z63.4 GRIEF AT LOSS OF CHILD: ICD-10-CM

## 2022-02-02 DIAGNOSIS — J44.9 CHRONIC OBSTRUCTIVE PULMONARY DISEASE, UNSPECIFIED COPD TYPE (HCC): Primary | ICD-10-CM

## 2022-02-02 DIAGNOSIS — J20.9 ACUTE BRONCHITIS, UNSPECIFIED ORGANISM: ICD-10-CM

## 2022-02-02 PROCEDURE — 99213 OFFICE O/P EST LOW 20 MIN: CPT | Performed by: INTERNAL MEDICINE

## 2022-02-02 SDOH — SOCIAL STABILITY - SOCIAL INSECURITY: DISSAPEARANCE AND DEATH OF FAMILY MEMBER: Z63.4

## 2022-02-02 NOTE — PROGRESS NOTES
Assessment/Plan:           Problem List Items Addressed This Visit        Respiratory    Chronic obstructive pulmonary disease ([address] ) - Primary      Other Visit Diagnoses     Acute bronchitis, unspecified organism        Grief at loss of child            symptoms resolving expected recovery see discussion above  Subjective:      Patient ID: Hollie Aponte is a 78 y o  male  HPI  Patient is here to follow-up on bronchitis  Reports no further nasal discharge but has a minimal postnasal drip  Sore throat is much better  His voice is getting better  The cough is much improved any does not feel short of breath  Chest x-ray still pending  He reports no fever chills post oximeter is excellent  Unfortunately patient lost his son recently  He does have lorazepam to use which is helping him cope with this sudden loss  He and his wife will visit his son's friends and his band today  Patient will keep me posted  I did mention antidepressant if need be  The following portions of the patient's history were reviewed and updated as appropriate: allergies, current medications, past family history, past medical history, past social history, past surgical history and problem list     Review of Systems      Objective:      /80 (BP Location: Left arm, Patient Position: Sitting, Cuff Size: Standard)   Pulse 82   Temp 98 2 °F (36 8 °C) (Tympanic)   Ht 6' 1" (1 854 m)   Wt 99 2 kg (218 lb 9 6 oz)   SpO2 95%   BMI 28 84 kg/m²          Physical Exam  HENT:      Head:      Comments: Mild hoarseness of voice  Minimal throat erythema postnasal drip  Neck:      Comments: Negative JVD  Cardiovascular:      Rate and Rhythm: Normal rate and regular rhythm  Pulmonary:      Effort: Pulmonary effort is normal  No respiratory distress  Breath sounds: Normal breath sounds  No wheezing or rales  Musculoskeletal:      Right lower leg: No edema  Left lower leg: No edema  BMI Counseling:  Body mass index is 28 84 kg/m²  The BMI is above normal  Nutrition recommendations include moderation in carbohydrate intake  Rationale for BMI follow-up plan is due to patient being overweight or obese  no vertigo/no throat pain/no dysphagia/no ear pain/no sinus symptoms/no nasal congestion/no tinnitus

## 2022-02-03 ENCOUNTER — TELEPHONE (OUTPATIENT)
Dept: FAMILY MEDICINE CLINIC | Facility: CLINIC | Age: 67
End: 2022-02-03

## 2022-02-03 ENCOUNTER — PATIENT OUTREACH (OUTPATIENT)
Dept: CASE MANAGEMENT | Facility: OTHER | Age: 67
End: 2022-02-03

## 2022-02-03 NOTE — PROGRESS NOTES
OSW sent [employer] text to Mr Lucero today, offering support and to check in with him  It appears pt prefers outreach in text format  OSW did not receive a response yet, however will continue to follow  Addendum: Pt returned texts to this writer  He stated his wife continues to struggle with her grief, and they are upset with the mental health system, where his son was on a waiting list for a psychiatrist  Julia Vallejo provided empathy and validation of his feelings  Encouraged him to consider suicide survivor group support, and offered assistance with finding this information  Will follow

## 2022-02-04 ENCOUNTER — TELEPHONE (OUTPATIENT)
Dept: FAMILY MEDICINE CLINIC | Facility: CLINIC | Age: 67
End: 2022-02-04

## 2022-02-04 NOTE — TELEPHONE ENCOUNTER
----- Message from Elijah Marroquin MD sent at 2/4/2022  2:12 PM EST -----  Chest x-ray no acute finding except for COPD changes

## 2022-02-06 DIAGNOSIS — C91.10 CLL (CHRONIC LYMPHOCYTIC LEUKEMIA) (HCC): ICD-10-CM

## 2022-02-06 DIAGNOSIS — D80.1 HYPOGAMMAGLOBULINEMIA, ACQUIRED (HCC): Primary | ICD-10-CM

## 2022-02-08 ENCOUNTER — HOSPITAL ENCOUNTER (OUTPATIENT)
Dept: INFUSION CENTER | Facility: CLINIC | Age: 67
Discharge: HOME | End: 2022-02-08
Payer: MEDICARE

## 2022-02-08 VITALS
HEART RATE: 71 BPM | RESPIRATION RATE: 18 BRPM | TEMPERATURE: 97.8 F | BODY MASS INDEX: 29.35 KG/M2 | WEIGHT: 222.44 LBS | SYSTOLIC BLOOD PRESSURE: 172 MMHG | DIASTOLIC BLOOD PRESSURE: 90 MMHG

## 2022-02-08 DIAGNOSIS — C91.10 CLL (CHRONIC LYMPHOCYTIC LEUKEMIA) (HCC): Primary | ICD-10-CM

## 2022-02-08 DIAGNOSIS — D80.1 HYPOGAMMAGLOBULINEMIA, ACQUIRED (HCC): ICD-10-CM

## 2022-02-08 PROCEDURE — 96365 THER/PROPH/DIAG IV INF INIT: CPT

## 2022-02-08 PROCEDURE — 96366 THER/PROPH/DIAG IV INF ADDON: CPT

## 2022-02-08 PROCEDURE — 96367 TX/PROPH/DG ADDL SEQ IV INF: CPT

## 2022-02-08 PROCEDURE — 96375 TX/PRO/DX INJ NEW DRUG ADDON: CPT

## 2022-02-08 RX ORDER — ACETAMINOPHEN 325 MG/1
650 TABLET ORAL ONCE
Status: CANCELLED | OUTPATIENT
Start: 2022-03-08

## 2022-02-08 RX ORDER — SODIUM CHLORIDE 9 MG/ML
20 INJECTION, SOLUTION INTRAVENOUS ONCE
Status: COMPLETED | OUTPATIENT
Start: 2022-02-08 | End: 2022-02-08

## 2022-02-08 RX ORDER — ACETAMINOPHEN 325 MG/1
650 TABLET ORAL ONCE
Status: COMPLETED | OUTPATIENT
Start: 2022-02-08 | End: 2022-02-08

## 2022-02-08 RX ORDER — SODIUM CHLORIDE 9 MG/ML
20 INJECTION, SOLUTION INTRAVENOUS ONCE
Status: CANCELLED | OUTPATIENT
Start: 2022-03-08

## 2022-02-08 RX ADMIN — HYDROCORTISONE SODIUM SUCCINATE 100 MG: 100 INJECTION, POWDER, FOR SOLUTION INTRAMUSCULAR; INTRAVENOUS at 09:05

## 2022-02-08 RX ADMIN — SODIUM CHLORIDE 20 ML/HR: 0.9 INJECTION, SOLUTION INTRAVENOUS at 08:34

## 2022-02-08 RX ADMIN — Medication 20 G: at 09:12

## 2022-02-08 RX ADMIN — ACETAMINOPHEN 650 MG: 325 TABLET, FILM COATED ORAL at 08:36

## 2022-02-08 RX ADMIN — DIPHENHYDRAMINE HYDROCHLORIDE 12.5 MG: 50 INJECTION, SOLUTION INTRAMUSCULAR; INTRAVENOUS at 08:36

## 2022-02-08 NOTE — PLAN OF CARE
Problem: Knowledge Deficit  Goal: Patient/family/caregiver demonstrates understanding of disease process, treatment plan, medications, and discharge instructions  Description: Complete learning assessment and assess knowledge base    Interventions:  - Provide teaching at level of understanding  - Provide teaching via preferred learning methods  2/8/2022 0845 by Mahogany Daniels RN  Outcome: Progressing  2/8/2022 0845 by Mahogany Daniels RN  Outcome: Progressing

## 2022-02-08 NOTE — PROGRESS NOTES
Patient arrived on unit for IVIG  Denies any present issues/concerns  Tolerated IVIG infusion without incident  Aware of next appointment   Declined AVS

## 2022-02-09 ENCOUNTER — TELEPHONE (OUTPATIENT)
Dept: FAMILY MEDICINE CLINIC | Facility: CLINIC | Age: 67
End: 2022-02-09

## 2022-02-09 NOTE — TELEPHONE ENCOUNTER
Received a fax from [address] for a refill on [address] [place]/INH this isn't on patient medication list please advise if approved order will be placed thank you

## 2022-02-10 ENCOUNTER — TELEPHONE (OUTPATIENT)
Dept: HEMATOLOGY ONCOLOGY | Facility: CLINIC | Age: 67
End: 2022-02-10

## 2022-02-10 ENCOUNTER — TELEPHONE (OUTPATIENT)
Dept: GYNECOLOGIC ONCOLOGY | Facility: CLINIC | Age: 67
End: 2022-02-10

## 2022-02-10 DIAGNOSIS — C91.10 CHRONIC LYMPHOCYTIC LEUKEMIA (HCC): Primary | ICD-10-CM

## 2022-02-10 NOTE — TELEPHONE ENCOUNTER
Patient called he would like to speak to a nurse regarding the Covid Medication designated for Cancer patients

## 2022-02-10 NOTE — TELEPHONE ENCOUNTER
Scheduled patient for Frankie on 2/17/22 @ [facility]  Reviewed appointment date and time with patient    He verbalized understanding

## 2022-02-10 NOTE — TELEPHONE ENCOUNTER
Returned call to patient  He explained he would like to get scheduled for Evusheld    Patient is requesting the Venyo  I will make arrangements and call patient back with date and time

## 2022-02-15 DIAGNOSIS — E09.9 STEROID-INDUCED DIABETES (HCC): ICD-10-CM

## 2022-02-15 DIAGNOSIS — T38.0X5A STEROID-INDUCED DIABETES (HCC): ICD-10-CM

## 2022-02-15 RX ORDER — INSULIN LISPRO 100 [IU]/ML
INJECTION, SOLUTION INTRAVENOUS; SUBCUTANEOUS
Qty: 15 ML | Refills: 0 | Status: SHIPPED | OUTPATIENT
Start: 2022-02-15 | End: 2022-06-29

## 2022-02-17 ENCOUNTER — HOSPITAL ENCOUNTER (OUTPATIENT)
Dept: INFUSION CENTER | Facility: HOSPITAL | Age: 67
Discharge: HOME | End: 2022-02-17
Attending: INTERNAL MEDICINE
Payer: MEDICARE

## 2022-02-17 VITALS
DIASTOLIC BLOOD PRESSURE: 60 MMHG | HEART RATE: 72 BPM | SYSTOLIC BLOOD PRESSURE: 127 MMHG | OXYGEN SATURATION: 97 % | RESPIRATION RATE: 17 BRPM | TEMPERATURE: 96.7 F

## 2022-02-17 DIAGNOSIS — C91.10 CHRONIC LYMPHOCYTIC LEUKEMIA (HCC): Primary | ICD-10-CM

## 2022-02-17 PROCEDURE — M0220 HB TIXAGEV AND CILGAV INF ADMIN: HCPCS | Performed by: INTERNAL MEDICINE

## 2022-02-17 RX ADMIN — AZD7442 300 MG COMBINED: KIT at 16:34

## 2022-02-17 NOTE — PROGRESS NOTES
Pt here for Evusheld injections  Vital signs stable  EUA criteria reviewed and copy is given to pt  Pt gives verbal consent to proceed with treatment  1 IM injection given in left buttocks and 1 IM injection given in right buttocks  Pt tolerated well  Pt resting comfortably and aware they will be observed for 1 hour  Call bell within reach

## 2022-02-22 ENCOUNTER — HOSPITAL ENCOUNTER (OUTPATIENT)
Dept: INFUSION CENTER | Facility: CLINIC | Age: 67
Discharge: HOME | End: 2022-02-22
Payer: MEDICARE

## 2022-02-22 VITALS
HEART RATE: 73 BPM | SYSTOLIC BLOOD PRESSURE: 167 MMHG | WEIGHT: 223.77 LBS | RESPIRATION RATE: 18 BRPM | DIASTOLIC BLOOD PRESSURE: 94 MMHG | BODY MASS INDEX: 29.66 KG/M2 | TEMPERATURE: 98.6 F | HEIGHT: 73 IN

## 2022-02-22 DIAGNOSIS — C91.10 CLL (CHRONIC LYMPHOCYTIC LEUKEMIA) (HCC): Primary | ICD-10-CM

## 2022-02-22 DIAGNOSIS — E11.9 TYPE 2 DIABETES MELLITUS WITHOUT COMPLICATION, WITH LONG-TERM CURRENT USE OF INSULIN (HCC): ICD-10-CM

## 2022-02-22 DIAGNOSIS — Z79.4 TYPE 2 DIABETES MELLITUS WITHOUT COMPLICATION, WITH LONG-TERM CURRENT USE OF INSULIN (HCC): ICD-10-CM

## 2022-02-22 LAB
ALBUMIN SERPL BCP-MCNC: 3.8 G/DL (ref 3.5–5)
ALP SERPL-CCNC: 58 U/L (ref 46–116)
ALT SERPL W P-5'-P-CCNC: 48 U/L (ref 12–78)
ANION GAP SERPL CALCULATED.3IONS-SCNC: 7 MMOL/L (ref 4–13)
AST SERPL W P-5'-P-CCNC: 30 U/L (ref 5–45)
BASOPHILS # BLD MANUAL: 0 THOUSAND/UL (ref 0–0.1)
BASOPHILS NFR MAR MANUAL: 0 % (ref 0–1)
BILIRUB SERPL-MCNC: 0.28 MG/DL (ref 0.2–1)
BUN SERPL-MCNC: 14 MG/DL (ref 5–25)
CALCIUM SERPL-MCNC: 8.7 MG/DL (ref 8.3–10.1)
CHLORIDE SERPL-SCNC: 106 MMOL/L (ref 100–108)
CO2 SERPL-SCNC: 28 MMOL/L (ref 21–32)
CREAT SERPL-MCNC: 1.08 MG/DL (ref 0.6–1.3)
EOSINOPHIL # BLD MANUAL: 0.33 THOUSAND/UL (ref 0–0.4)
EOSINOPHIL NFR BLD MANUAL: 6 % (ref 0–6)
ERYTHROCYTE [DISTWIDTH] IN BLOOD BY AUTOMATED COUNT: 14.7 % (ref 11.6–15.1)
GFR SERPL CREATININE-BSD FRML MDRD: 70 ML/MIN/1.73SQ M
GLUCOSE SERPL-MCNC: 167 MG/DL (ref 65–140)
HCT VFR BLD AUTO: 43.7 % (ref 36.5–49.3)
HGB BLD-MCNC: 13.9 G/DL (ref 12–17)
IGG SERPL-MCNC: 544 MG/DL (ref 700–1600)
LYMPHOCYTES # BLD AUTO: 0.66 THOUSAND/UL (ref 0.6–4.47)
LYMPHOCYTES # BLD AUTO: 12 % (ref 14–44)
MCH RBC QN AUTO: 29.7 PG (ref 26.8–34.3)
MCHC RBC AUTO-ENTMCNC: 31.8 G/DL (ref 31.4–37.4)
MCV RBC AUTO: 93 FL (ref 82–98)
MONOCYTES # BLD AUTO: 0.71 THOUSAND/UL (ref 0–1.22)
MONOCYTES NFR BLD: 13 % (ref 4–12)
NEUTROPHILS # BLD MANUAL: 3.6 THOUSAND/UL (ref 1.85–7.62)
NEUTS BAND NFR BLD MANUAL: 8 % (ref 0–8)
NEUTS SEG NFR BLD AUTO: 58 % (ref 43–75)
PLATELET # BLD AUTO: 166 THOUSANDS/UL (ref 149–390)
PLATELET BLD QL SMEAR: ADEQUATE
PMV BLD AUTO: 12 FL (ref 8.9–12.7)
POTASSIUM SERPL-SCNC: 3.7 MMOL/L (ref 3.5–5.3)
PROT SERPL-MCNC: 6.5 G/DL (ref 6.4–8.2)
RBC # BLD AUTO: 4.68 MILLION/UL (ref 3.88–5.62)
RBC MORPH BLD: NORMAL
RETICS # AUTO: ABNORMAL 10*3/UL (ref 14356–105094)
RETICS # CALC: 2.54 % (ref 0.37–1.87)
SODIUM SERPL-SCNC: 141 MMOL/L (ref 136–145)
VARIANT LYMPHS # BLD AUTO: 3 %
WBC # BLD AUTO: 5.46 THOUSAND/UL (ref 4.31–10.16)

## 2022-02-22 PROCEDURE — 85027 COMPLETE CBC AUTOMATED: CPT | Performed by: INTERNAL MEDICINE

## 2022-02-22 PROCEDURE — 85025 COMPLETE CBC W/AUTO DIFF WBC: CPT | Performed by: INTERNAL MEDICINE

## 2022-02-22 PROCEDURE — 83036 HEMOGLOBIN GLYCOSYLATED A1C: CPT | Performed by: INTERNAL MEDICINE

## 2022-02-22 PROCEDURE — 96415 CHEMO IV INFUSION ADDL HR: CPT

## 2022-02-22 PROCEDURE — 85007 BL SMEAR W/DIFF WBC COUNT: CPT | Performed by: INTERNAL MEDICINE

## 2022-02-22 PROCEDURE — 85045 AUTOMATED RETICULOCYTE COUNT: CPT | Performed by: INTERNAL MEDICINE

## 2022-02-22 PROCEDURE — 80053 COMPREHEN METABOLIC PANEL: CPT | Performed by: INTERNAL MEDICINE

## 2022-02-22 PROCEDURE — 82784 ASSAY IGA/IGD/IGG/IGM EACH: CPT | Performed by: INTERNAL MEDICINE

## 2022-02-22 PROCEDURE — 96413 CHEMO IV INFUSION 1 HR: CPT

## 2022-02-22 PROCEDURE — 96367 TX/PROPH/DG ADDL SEQ IV INF: CPT

## 2022-02-22 RX ORDER — SODIUM CHLORIDE 9 MG/ML
20 INJECTION, SOLUTION INTRAVENOUS ONCE
Status: COMPLETED | OUTPATIENT
Start: 2022-02-22 | End: 2022-02-22

## 2022-02-22 RX ORDER — ACETAMINOPHEN 325 MG/1
650 TABLET ORAL ONCE
Status: COMPLETED | OUTPATIENT
Start: 2022-02-22 | End: 2022-02-22

## 2022-02-22 RX ADMIN — OBINUTUZUMAB 1000 MG: 1000 INJECTION, SOLUTION, CONCENTRATE INTRAVENOUS at 10:01

## 2022-02-22 RX ADMIN — SODIUM CHLORIDE 20 ML/HR: 0.9 INJECTION, SOLUTION INTRAVENOUS at 08:33

## 2022-02-22 RX ADMIN — ACETAMINOPHEN 650 MG: 325 TABLET, FILM COATED ORAL at 08:36

## 2022-02-22 RX ADMIN — DIPHENHYDRAMINE HYDROCHLORIDE 25 MG: 50 INJECTION, SOLUTION INTRAMUSCULAR; INTRAVENOUS at 09:00

## 2022-02-22 RX ADMIN — DEXAMETHASONE SODIUM PHOSPHATE 20 MG: 10 INJECTION, SOLUTION INTRAMUSCULAR; INTRAVENOUS at 08:35

## 2022-02-22 NOTE — PROGRESS NOTES
Pt presents for [facility] today, needs labs drawn prior to treatment  Labs ordered and drawn via port  Dr Miranda Barley office notified that there is no future order for retic and IgG labs for next time  Completed treatment without incident  Declined AVS, states he check his appts through [address]

## 2022-02-23 DIAGNOSIS — E11.9 TYPE 2 DIABETES MELLITUS WITHOUT COMPLICATION, WITH LONG-TERM CURRENT USE OF INSULIN (HCC): Primary | ICD-10-CM

## 2022-02-23 DIAGNOSIS — Z79.4 TYPE 2 DIABETES MELLITUS WITHOUT COMPLICATION, WITH LONG-TERM CURRENT USE OF INSULIN (HCC): Primary | ICD-10-CM

## 2022-02-23 LAB
EST. AVERAGE GLUCOSE BLD GHB EST-MCNC: 171 MG/DL
HBA1C MFR BLD: 7.6 %

## 2022-02-24 ENCOUNTER — OFFICE VISIT (OUTPATIENT)
Dept: DIABETES SERVICES | Facility: HOSPITAL | Age: 67
End: 2022-02-24
Attending: INTERNAL MEDICINE
Payer: MEDICARE

## 2022-02-24 VITALS — WEIGHT: 223 LBS | BODY MASS INDEX: 29.55 KG/M2 | HEIGHT: 73 IN

## 2022-02-24 DIAGNOSIS — E11.9 TYPE 2 DIABETES MELLITUS WITHOUT COMPLICATION, WITH LONG-TERM CURRENT USE OF INSULIN (HCC): Primary | ICD-10-CM

## 2022-02-24 DIAGNOSIS — Z79.4 TYPE 2 DIABETES MELLITUS WITHOUT COMPLICATION, WITH LONG-TERM CURRENT USE OF INSULIN (HCC): Primary | ICD-10-CM

## 2022-02-24 PROCEDURE — G0108 DIAB MANAGE TRN  PER INDIV: HCPCS | Performed by: DIETITIAN, REGISTERED

## 2022-02-24 NOTE — PROGRESS NOTES
[facility] Training     Met with Chikis Akbar for [employer]  Pt was instructed on use of device, location, cleaning the area, and insertion  Leija  was programmed with customized high and low settings as appropriate  Zoraida Solis left my office with the Leija on and in warm up mode  Pt was asked to create a [employer] account and link to our office so that they can upload from home as needed and we will download the  when in office  Zoraida Solis will call with questions or for more education as needed  He is currently running it through his phone, if that gives him issues he will run it through the reciever  I asked him to reach out to the [facility] office to ask for a linking code to be able to have reading go to Endo directly  We have a different account at [facility]  No f/u scheduled with me as he has lots of appts currently with his cancer treatments  Logon: email, password [email or web address]      Ht Readings from Last 1 Encounters:   02/24/22 6' 1" (1 854 m)     Wt Readings from Last 3 Encounters:   02/24/22 101 kg (223 lb)   02/22/22 101 kg (223 lb 12 3 oz)   02/08/22 101 kg (222 lb 7 1 oz)        Body mass index is 29 42 kg/m²       Lab Results   Component Value Date    HGBA1C 7 6 (H) 02/22/2022    HGBA1C 7 4 (H) 11/30/2021    HGBA1C 7 4 (H) 04/20/2021       Lab Results   Component Value Date    CHOL 122 02/11/2014     Lab Results   Component Value Date    HDL 27 (L) 11/30/2021    HDL 30 (L) 01/12/2021    HDL 28 (L) 12/01/2020     Lab Results   Component Value Date    LDLCALC 113 (H) 11/30/2021    LDLCALC 130 (H) 01/12/2021    LDLCALC 136 (H) 12/01/2020     Lab Results   Component Value Date    TRIG 147 11/30/2021    TRIG 100 01/12/2021    TRIG 138 12/01/2020     No results found for: CHOLHDL  No results found for: Paige Gibbs    Diabetes Education Record  Zoraida Solis received the following handouts: none today      Patient response to instruction    Comprehensiongood  Motivationgood  Expected Compliancegood    Thank you for referring your patient to [facility], it was a pleasure working with them today  Please feel free to call with any questions or concerns      Magali Mullins, [address] [place] [address]  [address] [facility] [address] [facility] [id number]

## 2022-02-25 DIAGNOSIS — J44.9 CHRONIC OBSTRUCTIVE PULMONARY DISEASE, UNSPECIFIED COPD TYPE (HCC): ICD-10-CM

## 2022-02-25 RX ORDER — MOMETASONE FUROATE AND FORMOTEROL FUMARATE DIHYDRATE 200; 5 UG/1; UG/1
2 AEROSOL RESPIRATORY (INHALATION) 2 TIMES DAILY
Qty: 13 G | Refills: 1 | Status: SHIPPED | OUTPATIENT
Start: 2022-02-25

## 2022-02-27 DIAGNOSIS — F41.9 ANXIETY DISORDER, UNSPECIFIED TYPE: ICD-10-CM

## 2022-02-28 RX ORDER — LORAZEPAM 0.5 MG/1
0.5 TABLET ORAL DAILY PRN
Qty: 30 TABLET | Refills: 0 | Status: SHIPPED | OUTPATIENT
Start: 2022-02-28

## 2022-03-01 DIAGNOSIS — J45.909 MODERATE ASTHMA, UNSPECIFIED WHETHER COMPLICATED, UNSPECIFIED WHETHER PERSISTENT: ICD-10-CM

## 2022-03-02 ENCOUNTER — OFFICE VISIT (OUTPATIENT)
Dept: HEMATOLOGY ONCOLOGY | Facility: CLINIC | Age: 67
End: 2022-03-02
Payer: MEDICARE

## 2022-03-02 VITALS
HEIGHT: 73 IN | BODY MASS INDEX: 28.89 KG/M2 | SYSTOLIC BLOOD PRESSURE: 124 MMHG | OXYGEN SATURATION: 98 % | DIASTOLIC BLOOD PRESSURE: 64 MMHG | WEIGHT: 218 LBS | RESPIRATION RATE: 18 BRPM | TEMPERATURE: 97.8 F | HEART RATE: 66 BPM

## 2022-03-02 DIAGNOSIS — C91.10 CHRONIC LYMPHOCYTIC LEUKEMIA (HCC): Primary | ICD-10-CM

## 2022-03-02 PROCEDURE — 99214 OFFICE O/P EST MOD 30 MIN: CPT | Performed by: PHYSICIAN ASSISTANT

## 2022-03-02 NOTE — PROGRESS NOTES
Hematology/Oncology Outpatient Follow-up  Valentino Grice 78 y o  male 1955 [id number]    Date:  3/2/2022      Assessment and Plan:     1  Chronic lymphocytic leukemia [facility])  79-year-old male presents for follow-up regarding history of chronic lymphocytic leukemia  He is managed with Imbruvica 40 mg p o  daily  He receives IVIG every 28 days and Gazayva every 28 days as well  He tolerates this well  IgG is at sufficient level but needs to be continued due to still being low  Patient did not wish to proceed with COVID-19 vaccine  He did agree to Bear Yonathan for which he had 1st injection in February and subsequent is scheduled for August   He did not have any adverse effects with this  No change in therapy  Follow-up in 4 months  Continue laboratory assessment monthly at the infusion center      HPI:  Oncology History   CLL (chronic lymphocytic leukemia) ([facility] [address] )   8/22/2017 -  Chemotherapy    chlorambucil (LEUKERAN), 0 5 mg/kg, Oral, Every 14 days, 6 of 6 cycles  obinutuzumab (GAZYVA) day 1 IVPB, 100 mg, Intravenous, Once, 1 of 1 cycle  obinutuzumab (GAZYVA) day 2 titrated infusion, 900 mg, Intravenous, Once, 1 of 1 cycle  obinutuzumab (GAZYVA) subsequent titrated infusion, 1,000 mg, Intravenous, Once, 43 of 47 cycles  Administration: 1,000 mg (5/21/2019), 1,000 mg (6/18/2019), 1,000 mg (7/16/2019), 1,000 mg (8/13/2019), 1,000 mg (9/10/2019), 1,000 mg (10/8/2019), 1,000 mg (11/5/2019), 1,000 mg (12/3/2019), 1,000 mg (12/31/2019), 1,000 mg (1/28/2020), 1,000 mg (2/25/2020), 1,000 mg (3/24/2020), 1,000 mg (4/21/2020), 1,000 mg (5/19/2020), 1,000 mg (6/16/2020), 1,000 mg (7/14/2020), 1,000 mg (8/11/2020), 1,000 mg (9/9/2020), 1,000 mg (10/6/2020), 1,000 mg (11/3/2020), 1,000 mg (12/1/2020), 1,000 mg (1/26/2021), 1,000 mg (12/29/2020), 1,000 mg (2/23/2021), 1,000 mg (3/23/2021), 1,000 mg (4/20/2021), 1,000 mg (5/18/2021), 1,000 mg (6/15/2021), 1,000 mg (7/13/2021), 1,000 mg (8/10/2021), 1,000 mg (9/7/2021), 1,000 mg (10/5/2021), 1,000 mg (11/2/2021), 1,000 mg (11/30/2021), 1,000 mg (12/28/2021), 1,000 mg (1/25/2022), 1,000 mg (2/22/2022)     4/24/2018 Initial Diagnosis    CLL (chronic lymphocytic leukemia) [facility])       58-year-old male presents for follow-up regarding a longstanding history of CLL as well as hemolytic anemia related to his CLL      On 3/20/17 patient found to have hemoglobin of 6 2, ,000  He was admitted to [place] [address] for blood transfusion and plan to transfer to [address] [place] [address]  On 3/20/17 WBC count 195,000, hemoglobin 4 9, platelet count 65  Direct coomb's was positive with undetectable haptoglobin  He was given 2 units of PRBCs, Solu-Medrol 1 g ×3 days and IVIG 1 g/kg daily ×3 days  His hemoglobin continued to drop  Bone marrow biopsy on 3/21 showed marrow cellularity of greater than 95% almost replaced by small lymphocytes, lambda light chain restricted, CD20 positive, CD19 positive, CD23 positive partially expressing CD11c and CD25 and CD5 positive and negative for CD 79 and CD38  He was treated with single dose of chlorambucil to control his CLL   3/22 second dose of chlorambucil, also Rituxan 375 mg/M ² autoimmune hemolytic anemia  WBC continued to rise, 266,000  Patient initiated with Venetoclax 20 mg daily  Tumor lysis monitored every 8 hours; given aggressive hydration and Lasix and remained euvolemic  3/24-WBC dropped to 112,000  3/25- WBC 63,000  3/26-WBC 15,000, , platelet count 22,262  Patient became febrile, 101 3  The cefepime initiated Venetoclax held  3/28-patient fell from bed, CT head negative  ANC 1200, cefepime discontinued and Levaquin 75 mg daily started sliding 3/29 given second dose of rituximab 375 mg/m ²   3/30-WBC meg to 14 5 thousand, platelets 14,067, Venetoclax restarted 10 mg every other day  3/31 WBC 4 4, , platelet count 84,854  4/1-4/4: WBC maintained less than 10,000, ANC and platelet count meg   He was discharged on Venetoclax 10 mg every other day  He was instructed to discontinue simvastatin, Ranexa, aspirin, metoprolol 50 mg q  day, losartan 50 mg q  day, Plavix 75 mg q  day, folic acid 997 µg b i d , prednisone 60 mg, allopurinol  He was advised to continue Levaquin 750 mg daily for 10 days, Lexapro 10 mg daily, fenofibrate 50 mg daily, gabapentin 100 mg twice daily, Protonix 40 mg daily     Imaging studies performed at [facility]:  Patient had echocardiogram while inpatient at [facility] on 3/21  This study was unremarkable  CT chest abdomen pelvis without contrast on 3/24 showed stable pulmonary nodules, patchy groundglass densities of lower lobes previously identified and which may represent volume loss or early infiltrate  Persistent diffuse bronchial wall thickening suggesting acute/chronic bronchitis changes  No bronchiectasis  No masses  Stable lymphadenopathy of mediastinum  Stable axillary lymphadenopathy  Splenomegaly 23 9 cm  Extensive lymphadenopathy throughout the entire retroperitoneal, small bowel mesentery, and pelvis  Largest lymph node in right lower quadrant measured 4 6 x 4 8  Stable enlarged left para-aortic lymph node measuring 6 2 x 6 8   4/12/17: Patient received one dose of Rituxan on 4/7/17  Venetoclax 10 mg every other day 4/5/17 - 4/9/17  Venetoclax 10 mg every day beginning 4/10/17  Monitoring CBC 3 times per week  4/28/17: patient had follow-up appointment at [facility] with Dr Tarun John  She recommended to slowly increase dose of veneteclax  Also, she recommended as he has had numerous treatments with Rituxan, if antibody directed therapy becomes indicated in the future recommendation was with [facility]  She will be seeing her on a p r n  Basis      July 2017- Hemolysis  Disease not under control with veneteclax   Started prednisone 60 mg daily, folic acid, IBRUTINIB 903 mg daily and Gazyva       Sept 2017- 9/18-9/20 patient was admitted due to uncontrolled hyperglycemia with new onset DM type II due to chronic prednisone use for CLL/hemolytic anemia; also found to have profound neutropenia  Ibrutinib dose was reduced to 280 mg daily      October 2017- 10/7/17 - 10/14/17 patient was admitted due to cellulitis on his scalp     Dec 2017- Ferdie Flynn decreased to 140 mg PO daily due to thrombocytopenia      4/23 - 4/27 patient was admitted due to listeria bacteremia  He was discharged on IV ampicillin  Echo negative for vegetations  Imbruvica and [place] was on hold at that time  Repeat CBC on 5/14/18 showed normal ANC, and hemoglobin  Platelets adequate at 83K      10/18/18 the patient EGD   This showed distal esophageal stricture   This was dilated to 18 mm using a balloon dilator   He could not dilate any further due to bleeding secondary to thrombocytopenia    Interval history: patient son passed away by hanging himself by suicide on 1/25/22  He was at the infusion center when his wife called  He was tearful during the encounter today when discussing this  He states he is more concerned about his wife though  She is having significant guilt due to being home when his son hung himself  She has started to see a psychiatrist for which she took her to this visit yesterday today  Patient overall is doing as best as he can  He is not interested in any supportive services at this time  He had Evusheld on 2/17/22, scheduled for 2nd dose in Aug 2022  No side effects with this  ROS: Review of Systems   Constitutional: Negative for appetite change, fatigue and unexpected weight change  Respiratory: Negative for cough and shortness of breath  Cardiovascular: Negative for chest pain, palpitations and leg swelling  Gastrointestinal: Negative for abdominal pain, constipation, diarrhea, nausea and vomiting  Genitourinary: Negative for difficulty urinating, dysuria and hematuria     Musculoskeletal: Positive for arthralgias (sometimes but not frequent, more bothersome in the cold winter temps)  Skin: Negative  Neurological: Negative for dizziness, light-headedness and headaches  Hematological: Negative  Psychiatric/Behavioral: Negative  Past Medical History:   Diagnosis Date    Allergic     Anemia     Arthritis     CAD (coronary artery disease) 2004    Cancer ([place] [id number] )     Leukemia    Cellulitis of scalp     CKD (chronic kidney disease) stage 3, GFR 30-59 ml/min (HCC)     CLL (chronic lymphocytic leukemia) (HCC)     COPD (chronic obstructive pulmonary disease) ([place] [address] )     Diabetes mellitus ([place] [address] )     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    Dyslipidemia     Edema extremities     Esophageal ulcer     H/O blood clots     Hemolytic anemia (HCC)     Hiatal hernia     History of TIA (transient ischemic attack)     Hyperlipidemia     Hypertension     Listeria infection 2018    Multiple gastric ulcers     Myocardial infarction ([place] [address] ) 2004    acute    Neutropenia (HCC)     Obese     Recurrent sinusitis     Sleep apnea     Thrombocytopenia (HCC)     Vertigo        Past Surgical History:   Procedure Laterality Date    ARTHROSCOPIC REPAIR ACL      lt knee    CARDIAC SURGERY      cardiac cath with stent    FOOT SURGERY      IR PORT CHECK  5/17/2019    KNEE ARTHROSCOPY      OTHER SURGICAL HISTORY      cardiac cath lesion 1, 1st adjunct treat device: stent    PORTACATH PLACEMENT      LA ESOPHAGOGASTRODUODENOSCOPY TRANSORAL DIAGNOSTIC N/A 10/5/2017    Procedure: ESOPHAGOGASTRODUODENOSCOPY (EGD) with bx;  Surgeon: Mariposa Saab DO;  [facility]: [facility]; Service: Gastroenterology    LA ESOPHAGOGASTRODUODENOSCOPY TRANSORAL DIAGNOSTIC N/A 3/8/2018    Procedure: ESOPHAGOGASTRODUODENOSCOPY (EGD) with biopsy;  Surgeon: Mariposa Saab DO;  [facility]; Service: Gastroenterology    LA ESOPHAGOGASTRODUODENOSCOPY TRANSORAL DIAGNOSTIC N/A 6/20/2018    Procedure: ESOPHAGOGASTRODUODENOSCOPY (EGD) with Dilation;  Surgeon: Mariposa Saab DO;  [facility]: [facility];   Service: Gastroenterology    MD ESOPHAGOGASTRODUODENOSCOPY TRANSORAL DIAGNOSTIC N/A 10/18/2018    Procedure: ESOPHAGOGASTRODUODENOSCOPY (EGD) with dilation;  Surgeon: Mauro Treviño MD;  [facility]: [facility]; Service: Gastroenterology    MD ESOPHAGOSCOPY FLEXIBLE TRANSORAL WITH BIOPSY N/A 2016    Procedure: ESOPHAGOGASTRODUODENOSCOPY (EGD); ESOPHAGEAL DILATION; ESOPHAGEAL BIOPSY;  Surgeon: Amber Zhang MD;  [facility]: [facility];  Service: Thoracic    TONSILLECTOMY      UPPER GASTROINTESTINAL ENDOSCOPY      x 6       Social History     Socioeconomic History    Marital status: /Civil Union     Spouse name: Not on file    Number of children: Not on file    Years of education: Not on file    Highest education level: Not on file   Occupational History    Occupation: [address] Occupation: retired    Tobacco Use    Smoking status: Former Smoker     Packs/day: 1 00     Years: 10 00     Pack years: 10      Types: Cigarettes     Quit date:      Years since quittin     Smokeless tobacco: Never Used   Vaping Use    Vaping Use: Never used   Substance and Sexual Activity    Alcohol use:  Yes     Alcohol/week: 2 0 standard drinks     Types: 2 Cans of beer per week     Comment: social use as per [employer]    Drug use: No    Sexual activity: Not on file   Other Topics Concern    Not on file   Social History Narrative    Not on file     Social Determinants of Health     Financial Resource Strain: Not on file   Food Insecurity: Not on file   Transportation Needs: Not on file   Physical Activity: Not on file   Stress: Not on file   Social Connections: Not on file   Intimate Partner Violence: Not on file   Housing Stability: Not on file       Family History   Problem Relation Age of Onset    Leukemia Mother         chronic    Colon polyps Mother         sidmoid    Parkinsonism Father        Allergies   Allergen Reactions    Heparin Other (See Comments)     Other reaction(s): Blood Disorders  clot    Macrolides And Ketolides Other (See Comments)     Interacts with other meds he is taking    Simvastatin Myalgia         Current Outpatient Medications:     acyclovir (ZOVIRAX) 400 MG tablet, TAKE 1 TABLET TWICE A DAY, Disp: 60 tablet, Rfl: 11    albuterol (2 5 mg/3 mL) 0 083 % nebulizer solution, Take 3 mL (2 5 mg total) by nebulization every 6 (six) hours as needed for wheezing or shortness of breath, Disp: 180 mL, Rfl: 5    amLODIPine (NORVASC) 10 mg tablet, Take 1 tablet (10 mg total) by mouth daily, Disp: 90 tablet, Rfl: 3    aspirin 81 MG tablet, Take 81 mg by mouth every other day Every other day , Disp: , Rfl:     benzonatate (TESSALON) 200 MG capsule, Take 1 capsule (200 mg total) by mouth 3 (three) times a day as needed for cough, Disp: 20 capsule, Rfl: 0    citalopram (CeleXA) 40 mg tablet, TAKE 1 TABLET DAILY, Disp: 90 tablet, Rfl: 5    fenofibrate (TRICOR) 145 mg tablet, TAKE 1 TABLET DAILY, Disp: 90 tablet, Rfl: 5    folic acid (FOLVITE) 1 mg tablet, Take 800 mcg by mouth daily , Disp: , Rfl:     furosemide (LASIX) 40 mg tablet, TAKE 1 TABLET DAILY, Disp: 90 tablet, Rfl: 5    gabapentin (NEURONTIN) 600 MG tablet, Take 1 tablet (600 mg total) by mouth daily, Disp: 90 tablet, Rfl: 6    glucose monitoring kit (FREESTYLE) monitoring kit, 1 each by Does not apply route as needed ( ) E 11 9, Disp: 1 each, Rfl: 0    guaifenesin-codeine (GUAIFENESIN AC) 100-10 MG/5ML liquid, Take 5 mL by mouth 3 (three) times a day as needed for cough, Disp: 236 mL, Rfl: 0    Ibrutinib 140 MG TABS, Take 140 mg by mouth daily, Disp: 30 tablet, Rfl: 11    Ibrutinib 140 MG TABS, Take 140 mg by mouth daily, Disp: 30 tablet, Rfl: 6    insulin degludec Ardell Surinder FlexTouch) 100 units/mL injection pen, Inject 15 Units under the skin daily at bedtime (Patient taking differently: Inject 15 Units under the skin daily at bedtime 17u at 9pm ), Disp: 15 mL, Rfl: 1    insulin lispro (HumaLOG) 100 units/mL injection pen, Use 5-10 units before meals as needed for steroid induced hyperglycemia, Disp: 15 mL, Rfl: 0    Insulin Pen Needle (BD Pen Needle Inez U/F) 32G X 4 MM MISC, Use 3 (three) times a day, Disp: 300 each, Rfl: 1    Insulin Syringe-Needle U-100 30G X 1/2" 0 3 ML MISC, by Does not apply route 2 (two) times a day, Disp: 100 each, Rfl: 6    Lancets (FREESTYLE) lancets, Use as instructed--test 2 times a day  E 11 9, Disp: 100 each, Rfl: 0    LORazepam (ATIVAN) 0 5 mg tablet, Take 1 tablet (0 5 mg total) by mouth daily as needed for anxiety, Disp: 30 tablet, Rfl: 0    losartan (COZAAR) 100 MG tablet, TAKE 1 TABLET DAILY, Disp: 90 tablet, Rfl: 5    mometasone-formoterol (Dulera) 200-5 MCG/ACT inhaler, Inhale 2 puffs 2 (two) times a day Rinse mouth after use , Disp: 13 g, Rfl: 1    multivitamin (THERAGRAN) TABS, Take 1 tablet by mouth daily, Disp: , Rfl:     naloxone (NARCAN) 4 mg/0 1 mL nasal spray, Administer 1 spray into a nostril  If no response after 2-3 minutes, give another dose in the other nostril using a new spray   (Patient not taking: Reported on 12/17/2021 ), Disp: 1 each, Rfl: 1    nystatin (MYCOSTATIN) 500,000 units/5 mL suspension, Apply 2 mL (200,000 Units total) to the mouth or throat 4 (four) times a day, Disp: 473 mL, Rfl: 0    obinutuzumab (GAZYVA) 1000 mg/40 mL SOLN, Infuse into a venous catheter, Disp: , Rfl:     oxyCODONE (ROXICODONE) 10 MG TABS, Take 1 tablet (10 mg total) by mouth every 6 (six) hours as needed for moderate pain For ongoing pain controlMax Daily Amount: 40 mg (Patient not taking: Reported on 12/17/2021 ), Disp: 90 tablet, Rfl: 0    pantoprazole (PROTONIX) 40 mg tablet, TAKE 1 TABLET DAILY, Disp: 90 tablet, Rfl: 5    Potassium (POTASSIMIN PO), Take 20 mEq by mouth daily  , Disp: , Rfl:     predniSONE 20 mg tablet, 20 mg one pill a day for 3 days then 10 mg po a day for 3 days then 5 mg po daily for 3 days, Disp: 20 tablet, Rfl: 0    predniSONE 5 mg tablet, TAKE 1 TABLET DAILY, Disp: 90 tablet, Rfl: 5    sodium chloride, PF, 0 9 %, 10 mL by Intracatheter route see administration instructions 10mL into port before and after ampicillin doses, Disp: , Rfl: 0    Ventolin  (90 Base) MCG/ACT inhaler, Inhale 1 puff 4 (four) times a day, Disp: 36 g, Rfl: 5    zolpidem (AMBIEN) 5 mg tablet, Take 1 tablet (5 mg total) by mouth daily at bedtime as needed for sleep (Patient not taking: Reported on 12/17/2021 ), Disp: 30 tablet, Rfl: 0      Physical Exam:  There were no vitals taken for this visit  Physical Exam  Vitals reviewed  Constitutional:       General: He is not in acute distress  Appearance: He is well-developed  HENT:      Head: Normocephalic and atraumatic  Eyes:      General: No scleral icterus  Conjunctiva/sclera: Conjunctivae normal    Cardiovascular:      Rate and Rhythm: Normal rate and regular rhythm  Heart sounds: Normal heart sounds  No murmur heard  Pulmonary:      Effort: Pulmonary effort is normal  No respiratory distress  Breath sounds: Normal breath sounds  Chest:   Breasts:      Right: No axillary adenopathy or supraclavicular adenopathy  Left: No axillary adenopathy or supraclavicular adenopathy  Abdominal:      Palpations: Abdomen is soft  Tenderness: There is no abdominal tenderness  Musculoskeletal:         General: No tenderness  Normal range of motion  Cervical back: Normal range of motion and neck supple  Right lower leg: No edema  Left lower leg: No edema  Lymphadenopathy:      Cervical: No cervical adenopathy  Upper Body:      Right upper body: No supraclavicular or axillary adenopathy  Left upper body: No supraclavicular or axillary adenopathy  Skin:     General: Skin is warm and dry  Comments: Vascular purpura chronic of bilateral upper extremities    Neurological:      Mental Status: He is alert and oriented to person, place, and time        Cranial Nerves: No cranial nerve deficit  Psychiatric:         Mood and Affect: Mood normal          Behavior: Behavior normal            Labs:  Lab Results   Component Value Date    WBC 5 46 02/22/2022    HGB 13 9 02/22/2022    HCT 43 7 02/22/2022    MCV 93 02/22/2022     02/22/2022     Lab Results   Component Value Date     12/29/2015    K 3 7 02/22/2022     02/22/2022    CO2 28 02/22/2022    ANIONGAP 8 12/29/2015    BUN 14 02/22/2022    CREATININE 1 08 02/22/2022    GLUCOSE 109 12/29/2015    GLUF 149 (H) 11/30/2021    CALCIUM 8 7 02/22/2022    CORRECTEDCA 10 0 03/23/2021    AST 30 02/22/2022    ALT 48 02/22/2022    ALKPHOS 58 02/22/2022    PROT 5 9 (L) 12/29/2015    BILITOT 0 7 12/29/2015    EGFR 70 02/22/2022     Patient voiced understanding and agreement in the above discussion  Aware to contact our office with questions/symptoms in the interim  This note has been generated by voice recognition software system  Therefore, there may be spelling, grammar, and or syntax errors  Please contact if questions arise

## 2022-03-03 ENCOUNTER — TELEPHONE (OUTPATIENT)
Dept: ENDOCRINOLOGY | Facility: CLINIC | Age: 67
End: 2022-03-03

## 2022-03-08 ENCOUNTER — HOSPITAL ENCOUNTER (OUTPATIENT)
Dept: INFUSION CENTER | Facility: CLINIC | Age: 67
Discharge: HOME | End: 2022-03-08
Payer: MEDICARE

## 2022-03-08 ENCOUNTER — TELEPHONE (OUTPATIENT)
Dept: FAMILY MEDICINE CLINIC | Facility: CLINIC | Age: 67
End: 2022-03-08

## 2022-03-08 VITALS
HEART RATE: 76 BPM | DIASTOLIC BLOOD PRESSURE: 81 MMHG | TEMPERATURE: 97.3 F | RESPIRATION RATE: 18 BRPM | SYSTOLIC BLOOD PRESSURE: 156 MMHG

## 2022-03-08 DIAGNOSIS — C91.10 CLL (CHRONIC LYMPHOCYTIC LEUKEMIA) (HCC): Primary | ICD-10-CM

## 2022-03-08 DIAGNOSIS — D80.1 HYPOGAMMAGLOBULINEMIA, ACQUIRED (HCC): ICD-10-CM

## 2022-03-08 PROCEDURE — 96365 THER/PROPH/DIAG IV INF INIT: CPT

## 2022-03-08 PROCEDURE — 96367 TX/PROPH/DG ADDL SEQ IV INF: CPT

## 2022-03-08 PROCEDURE — 96366 THER/PROPH/DIAG IV INF ADDON: CPT

## 2022-03-08 PROCEDURE — 96375 TX/PRO/DX INJ NEW DRUG ADDON: CPT

## 2022-03-08 RX ORDER — ACETAMINOPHEN 325 MG/1
650 TABLET ORAL ONCE
Status: COMPLETED | OUTPATIENT
Start: 2022-03-08 | End: 2022-03-08

## 2022-03-08 RX ORDER — SODIUM CHLORIDE 9 MG/ML
20 INJECTION, SOLUTION INTRAVENOUS ONCE
Status: COMPLETED | OUTPATIENT
Start: 2022-03-08 | End: 2022-03-08

## 2022-03-08 RX ORDER — ACETAMINOPHEN 325 MG/1
650 TABLET ORAL ONCE
Status: CANCELLED | OUTPATIENT
Start: 2022-04-05

## 2022-03-08 RX ORDER — SODIUM CHLORIDE 9 MG/ML
20 INJECTION, SOLUTION INTRAVENOUS ONCE
Status: CANCELLED | OUTPATIENT
Start: 2022-04-05

## 2022-03-08 RX ADMIN — Medication 20 G: at 08:57

## 2022-03-08 RX ADMIN — DIPHENHYDRAMINE HYDROCHLORIDE 12.5 MG: 50 INJECTION, SOLUTION INTRAMUSCULAR; INTRAVENOUS at 08:29

## 2022-03-08 RX ADMIN — SODIUM CHLORIDE 20 ML/HR: 9 INJECTION, SOLUTION INTRAVENOUS at 08:24

## 2022-03-08 RX ADMIN — HYDROCORTISONE SODIUM SUCCINATE 100 MG: 100 INJECTION, POWDER, FOR SOLUTION INTRAMUSCULAR; INTRAVENOUS at 08:28

## 2022-03-08 RX ADMIN — ACETAMINOPHEN 650 MG: 325 TABLET, FILM COATED ORAL at 08:28

## 2022-03-08 NOTE — PLAN OF CARE
Problem: Potential for Falls  Goal: Patient will remain free of falls  Description: INTERVENTIONS:  - Educate patient/family on patient safety including physical limitations  - Instruct patient to call for assistance with activity   - Consult OT/PT to assist with strengthening/mobility   - Keep Call bell within reach  - Keep bed low and locked with side rails adjusted as appropriate  - Keep care items and personal belongings within reach  - Initiate and maintain comfort rounds  - Make Fall Risk Sign visible to staff  - Obtain necessary fall risk management equipment:   - Apply yellow socks and bracelet for high fall risk patients  - Consider moving patient to room near nurses station  Outcome: Progressing

## 2022-03-08 NOTE — TELEPHONE ENCOUNTER
Pt  Shakila Sloan he has a personal question if you can please reach out at the end of the day or when you have time [phone]

## 2022-03-09 ENCOUNTER — CONSULT (OUTPATIENT)
Dept: PULMONOLOGY | Facility: CLINIC | Age: 67
End: 2022-03-09
Payer: MEDICARE

## 2022-03-09 VITALS
HEART RATE: 86 BPM | OXYGEN SATURATION: 96 % | BODY MASS INDEX: 29.55 KG/M2 | WEIGHT: 223 LBS | TEMPERATURE: 98.6 F | SYSTOLIC BLOOD PRESSURE: 152 MMHG | RESPIRATION RATE: 18 BRPM | DIASTOLIC BLOOD PRESSURE: 88 MMHG | HEIGHT: 73 IN

## 2022-03-09 DIAGNOSIS — J44.9 CHRONIC OBSTRUCTIVE PULMONARY DISEASE, UNSPECIFIED COPD TYPE (HCC): ICD-10-CM

## 2022-03-09 DIAGNOSIS — F17.211 CIGARETTE NICOTINE DEPENDENCE IN REMISSION: Primary | ICD-10-CM

## 2022-03-09 PROCEDURE — 99214 OFFICE O/P EST MOD 30 MIN: CPT | Performed by: INTERNAL MEDICINE

## 2022-03-09 NOTE — PROGRESS NOTES
Pulmonary Consultation   Jacklyn Mims 78 y o  male MRN: [id number]  3/9/2022      Assessment:  Chronic bronchitis/mild COPD   · No obstruction on PFT done in 2021, however has a significant tobacco abuse history and improvement of symptoms with ics/Laba so suspect a degree of COPD that is not shown on PFT in 2021  · Noted worsening of symptoms 3 months ago, likely exacerbation secondary to URI/acute bronchitis  · Overall improving, with ics/Laba Dulera started about 1 months ago    Plan:   · Continue Dulera 200 2 puffs q 12 hours  · P r n  albuterol  · Reviewed the proper inhaler use technique    Nicotine dependence/in remission   · About 66 pack year history, quit about 11 years ago  · No suspicious lesions on CT chest done 2019   · Had a shared decision making today, agreeable for lung cancer screening CT chest/ordered      Return in about 6 months (around 9/9/2022)  History of Present Illness     New Consult for:  COPD/chronic bronchitis      Background  78 y o  male with a h/o COPD, hypogammaglobulinemia/acquired, HTN, HIT, TIA, CLL, hemolytic anemia, reflux/esophageal stricture, depression, CKD, CAD, former tobacco abuse 66 pack year quit in 2011    Hx CLL on ibrutinib, receives IVIG and Obinutuzumab every 28 days  Patient states that he was diagnosed with COPD several years ago, has been only on p r n  albuterol  Months ago, had a URI like symptoms followed by acute bronchitis  Seen by PCP, prescribed inhalers in addition to antibiotics at some point in January with some improvement  Eventually started on Dulera inhaler with significant improvement of symptoms  Reported ongoing dyspnea on exertion such as walking [place] his [place], 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  Was very limited due to dyspnea/chest congestion, approximately 80% better with using the [facility]  Using p r n  albuterol about 4 times per week now    Able to walk at the surface level for long distances, climbing 1 flight of stairs  Recently dealing with a lot stressors in his life, his son committed suicide but he is able to manage it  He quit smoking approximately 11 years ago, last CT chest was done in 2019 showed no suspicious lesions  Reported a prior exposure to asbestos as a kid, 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  Review of Systems  As per hpi, all other systems reviewed and were negative  Studies:  Imaging and other studies: I have personally reviewed pertinent films in PACS    CT chest 03/24/2017 at [facility] pulmonary nodules, few lower lobe GGOs (suspect from volume loss/a early infiltrates however stable from a prior study) stable chronic bronchitis symptoms  Extensive lymphadenopathy in the chest/abdomen and pelvis  CT chest abdomen and pelvis 06/27/2019-feels, minutes of metastatic disease, hepatic steatosis  Chest x-ray 01/31/2022-bilateral hyperinflation/clear lung fields    Pulmonary function testing:   PFT 04/13/2021-ratio 73%, FEV1 3 28 L/89%, FVC 4 47 L/92%  TLC 97%, %  DLCO 76%  EKG, Pathology, and Other Studies: I have personally reviewed pertinent reports  TTE 04/27/2018-EF 60%, increase LV wall thickness mildly  No wall motion abnormality  Mild LA dilation  Normal RV size and function  No indirect findings to suggest moderate to severe pulmonary hypertension      Historical Information   Past Medical History:   Diagnosis Date    Allergic     Anemia     Arthritis     CAD (coronary artery disease) 2004    Cancer ([facility] )     Leukemia    Cellulitis of scalp     CKD (chronic kidney disease) stage 3, GFR 30-59 ml/min (HCC)     CLL (chronic lymphocytic leukemia) (HCC)     COPD (chronic obstructive pulmonary disease) ([facility] )     Diabetes mellitus ([facility] )     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    Dyslipidemia     Edema extremities     Esophageal ulcer     H/O blood clots     Hemolytic anemia (HCC)     Hiatal hernia     History of TIA (transient ischemic attack)     Hyperlipidemia     Hypertension     Listeria infection 2018    Multiple gastric ulcers     Myocardial infarction [facility]) 2004    acute    Neutropenia (HCC)     Obese     Recurrent sinusitis     Sleep apnea     Thrombocytopenia (HCC)     Vertigo      Past Surgical History:   Procedure Laterality Date    ARTHROSCOPIC REPAIR ACL      lt knee    CARDIAC SURGERY      cardiac cath with stent    FOOT SURGERY      IR PORT CHECK  5/17/2019    KNEE ARTHROSCOPY      OTHER SURGICAL HISTORY      cardiac cath lesion 1, 1st adjunct treat device: stent    PORTACATH PLACEMENT      TX ESOPHAGOGASTRODUODENOSCOPY TRANSORAL DIAGNOSTIC N/A 10/5/2017    Procedure: ESOPHAGOGASTRODUODENOSCOPY (EGD) with bx;  Surgeon: Mariposa Saab DO;  [facility]: [facility]; Service: Gastroenterology    TX ESOPHAGOGASTRODUODENOSCOPY TRANSORAL DIAGNOSTIC N/A 3/8/2018    Procedure: ESOPHAGOGASTRODUODENOSCOPY (EGD) with biopsy;  Surgeon: Mariposa Saab DO;  [facility]; Service: Gastroenterology    TX ESOPHAGOGASTRODUODENOSCOPY TRANSORAL DIAGNOSTIC N/A 6/20/2018    Procedure: ESOPHAGOGASTRODUODENOSCOPY (EGD) with Dilation;  Surgeon: Mariposa Saab DO;  [facility]: [facility]; Service: Gastroenterology    TX ESOPHAGOGASTRODUODENOSCOPY TRANSORAL DIAGNOSTIC N/A 10/18/2018    Procedure: ESOPHAGOGASTRODUODENOSCOPY (EGD) with dilation;  Surgeon: Layla Latham MD;  [facility]: [facility];   Service: Gastroenterology    TX ESOPHAGOSCOPY FLEXIBLE TRANSORAL WITH BIOPSY N/A 9/2/2016    Procedure: ESOPHAGOGASTRODUODENOSCOPY (EGD); ESOPHAGEAL DILATION; ESOPHAGEAL BIOPSY;  Surgeon: Brock Hays MD;  [facility]: [facility];  Service: Thoracic    TONSILLECTOMY      UPPER GASTROINTESTINAL ENDOSCOPY      x 6     Family History   Problem Relation Age of Onset    Leukemia Mother         chronic    Colon polyps Mother         sidmoid    Parkinsonism Father          Medications/Allergies: Reviewed    Vitals: Blood pressure 152/88, pulse 86, temperature 98 6 °F (37 °C), resp  rate 18, height 6' 1" (1 854 m), weight 101 kg (223 lb), SpO2 96 %  Body mass index is 29 42 kg/m²  Oxygen Therapy  SpO2: 96 %  Oxygen Therapy: None (Room air)      Physical Exam  Body mass index is 29 42 kg/m²  Gen: not in acute distress,   Neck/Eyes: supple, no adenopathy, PERRL  Ear: normal appearance, no significant hearing impairment  Nose:  normal nasal mucosa, no drainage  Mouth:  unremarkable/normal appearance of lips, teeth and gums  Oropharynx: mucosa is moist, no focal lesions or erythema  Salivary glands: soft nontender  Chest: normal respiratory efforts, clear breath sounds bilaterally  CV: RRR, no murmurs appreciated, no edema  Abdomen: soft, non tender  Extremities:  No observed deformity   Skin: unremarkable  Neuro: AAO X3, no focal motor deficit       Labs:  Lab Results   Component Value Date    WBC 5 46 02/22/2022    HGB 13 9 02/22/2022    HCT 43 7 02/22/2022    MCV 93 02/22/2022     02/22/2022     Lab Results   Component Value Date    GLUCOSE 109 12/29/2015    CALCIUM 8 7 02/22/2022     12/29/2015    K 3 7 02/22/2022    CO2 28 02/22/2022     02/22/2022    BUN 14 02/22/2022    CREATININE 1 08 02/22/2022     No results found for: IGE  Lab Results   Component Value Date    ALT 48 02/22/2022    AST 30 02/22/2022    ALKPHOS 58 02/22/2022    BILITOT 0 7 12/29/2015           Portions of the record may have been created with voice recognition software  Occasional wrong word or "sound a like" substitutions may have occurred due to the inherent limitations of voice recognition software  Read the chart carefully and recognize, using context, where substitutions have occurred    TYSHAWN Hunter [facility]

## 2022-03-11 ENCOUNTER — TELEPHONE (OUTPATIENT)
Dept: DIABETES SERVICES | Facility: CLINIC | Age: 67
End: 2022-03-11

## 2022-03-11 NOTE — TELEPHONE ENCOUNTER
I called patient bc they cancelled their appt in 3/17 with david for a personal merary follow up  Can you check to make sure his Vijay Wayne is uploading for the office to view? He wants to reschedule with the educator if it's still not working  Patient sees Dr Neal Trotter  I can call him back and let him know

## 2022-03-14 DIAGNOSIS — Z79.4 TYPE 2 DIABETES MELLITUS WITHOUT COMPLICATION, WITH LONG-TERM CURRENT USE OF INSULIN (HCC): ICD-10-CM

## 2022-03-14 DIAGNOSIS — E11.9 TYPE 2 DIABETES MELLITUS WITHOUT COMPLICATION, WITH LONG-TERM CURRENT USE OF INSULIN (HCC): ICD-10-CM

## 2022-03-14 RX ORDER — INSULIN DEGLUDEC INJECTION 100 U/ML
15 INJECTION, SOLUTION SUBCUTANEOUS
Qty: 15 ML | Refills: 1 | Status: SHIPPED | OUTPATIENT
Start: 2022-03-14 | End: 2022-06-29 | Stop reason: SDUPTHER

## 2022-03-14 NOTE — TELEPHONE ENCOUNTER
Ryan Guess  to Binh Boyd MD          3/14/22 7:10 AM  Hello   I need persecution tresiba sent to [facility]   Thank you

## 2022-03-22 ENCOUNTER — HOSPITAL ENCOUNTER (OUTPATIENT)
Dept: INFUSION CENTER | Facility: CLINIC | Age: 67
Discharge: HOME | End: 2022-03-22
Payer: MEDICARE

## 2022-03-22 ENCOUNTER — PATIENT OUTREACH (OUTPATIENT)
Dept: CASE MANAGEMENT | Facility: OTHER | Age: 67
End: 2022-03-22

## 2022-03-22 VITALS
HEIGHT: 73 IN | DIASTOLIC BLOOD PRESSURE: 90 MMHG | WEIGHT: 226.19 LBS | SYSTOLIC BLOOD PRESSURE: 163 MMHG | RESPIRATION RATE: 18 BRPM | HEART RATE: 66 BPM | BODY MASS INDEX: 29.98 KG/M2 | TEMPERATURE: 96.8 F

## 2022-03-22 DIAGNOSIS — C91.10 CHRONIC LYMPHOCYTIC LEUKEMIA (HCC): ICD-10-CM

## 2022-03-22 DIAGNOSIS — C91.10 CLL (CHRONIC LYMPHOCYTIC LEUKEMIA) (HCC): Primary | ICD-10-CM

## 2022-03-22 LAB
ALBUMIN SERPL BCP-MCNC: 3.8 G/DL (ref 3.5–5)
ALP SERPL-CCNC: 66 U/L (ref 46–116)
ALT SERPL W P-5'-P-CCNC: 38 U/L (ref 12–78)
ANION GAP SERPL CALCULATED.3IONS-SCNC: 9 MMOL/L (ref 4–13)
AST SERPL W P-5'-P-CCNC: 27 U/L (ref 5–45)
BASOPHILS # BLD AUTO: 0.05 THOUSANDS/ΜL (ref 0–0.1)
BASOPHILS NFR BLD AUTO: 1 % (ref 0–1)
BILIRUB SERPL-MCNC: 0.46 MG/DL (ref 0.2–1)
BUN SERPL-MCNC: 16 MG/DL (ref 5–25)
CALCIUM SERPL-MCNC: 8.6 MG/DL (ref 8.3–10.1)
CHLORIDE SERPL-SCNC: 108 MMOL/L (ref 100–108)
CO2 SERPL-SCNC: 26 MMOL/L (ref 21–32)
CREAT SERPL-MCNC: 1.22 MG/DL (ref 0.6–1.3)
EOSINOPHIL # BLD AUTO: 0.3 THOUSAND/ΜL (ref 0–0.61)
EOSINOPHIL NFR BLD AUTO: 6 % (ref 0–6)
ERYTHROCYTE [DISTWIDTH] IN BLOOD BY AUTOMATED COUNT: 14.5 % (ref 11.6–15.1)
GFR SERPL CREATININE-BSD FRML MDRD: 60 ML/MIN/1.73SQ M
GLUCOSE SERPL-MCNC: 133 MG/DL (ref 65–140)
HCT VFR BLD AUTO: 42.4 % (ref 36.5–49.3)
HGB BLD-MCNC: 14 G/DL (ref 12–17)
HGB RETIC QN AUTO: 34.6 PG (ref 30–38.3)
IGG SERPL-MCNC: 520 MG/DL (ref 700–1600)
IMM GRANULOCYTES # BLD AUTO: 0.1 THOUSAND/UL (ref 0–0.2)
IMM GRANULOCYTES NFR BLD AUTO: 2 % (ref 0–2)
IMM RETICS NFR: 9.9 % (ref 0–14)
LYMPHOCYTES # BLD AUTO: 0.54 THOUSANDS/ΜL (ref 0.6–4.47)
LYMPHOCYTES NFR BLD AUTO: 11 % (ref 14–44)
MCH RBC QN AUTO: 30.8 PG (ref 26.8–34.3)
MCHC RBC AUTO-ENTMCNC: 33 G/DL (ref 31.4–37.4)
MCV RBC AUTO: 93 FL (ref 82–98)
MONOCYTES # BLD AUTO: 0.64 THOUSAND/ΜL (ref 0.17–1.22)
MONOCYTES NFR BLD AUTO: 13 % (ref 4–12)
NEUTROPHILS # BLD AUTO: 3.16 THOUSANDS/ΜL (ref 1.85–7.62)
NEUTS SEG NFR BLD AUTO: 67 % (ref 43–75)
NRBC BLD AUTO-RTO: 0 /100 WBCS
PLATELET # BLD AUTO: 157 THOUSANDS/UL (ref 149–390)
PMV BLD AUTO: 11.8 FL (ref 8.9–12.7)
POTASSIUM SERPL-SCNC: 4 MMOL/L (ref 3.5–5.3)
PROT SERPL-MCNC: 6.4 G/DL (ref 6.4–8.2)
RBC # BLD AUTO: 4.54 MILLION/UL (ref 3.88–5.62)
RETICS # AUTO: ABNORMAL 10*3/UL (ref 14356–105094)
RETICS # CALC: 1.92 % (ref 0.37–1.87)
SODIUM SERPL-SCNC: 143 MMOL/L (ref 136–145)
WBC # BLD AUTO: 4.79 THOUSAND/UL (ref 4.31–10.16)

## 2022-03-22 PROCEDURE — 85025 COMPLETE CBC W/AUTO DIFF WBC: CPT

## 2022-03-22 PROCEDURE — 96415 CHEMO IV INFUSION ADDL HR: CPT

## 2022-03-22 PROCEDURE — 85046 RETICYTE/HGB CONCENTRATE: CPT

## 2022-03-22 PROCEDURE — 82784 ASSAY IGA/IGD/IGG/IGM EACH: CPT

## 2022-03-22 PROCEDURE — 96413 CHEMO IV INFUSION 1 HR: CPT

## 2022-03-22 PROCEDURE — 96367 TX/PROPH/DG ADDL SEQ IV INF: CPT

## 2022-03-22 PROCEDURE — 80053 COMPREHEN METABOLIC PANEL: CPT

## 2022-03-22 RX ORDER — ACETAMINOPHEN 325 MG/1
650 TABLET ORAL ONCE
Status: COMPLETED | OUTPATIENT
Start: 2022-03-22 | End: 2022-03-22

## 2022-03-22 RX ORDER — SODIUM CHLORIDE 9 MG/ML
20 INJECTION, SOLUTION INTRAVENOUS ONCE
Status: COMPLETED | OUTPATIENT
Start: 2022-03-22 | End: 2022-03-22

## 2022-03-22 RX ADMIN — ACETAMINOPHEN 650 MG: 325 TABLET, FILM COATED ORAL at 08:31

## 2022-03-22 RX ADMIN — DEXAMETHASONE SODIUM PHOSPHATE 20 MG: 10 INJECTION, SOLUTION INTRAMUSCULAR; INTRAVENOUS at 08:33

## 2022-03-22 RX ADMIN — SODIUM CHLORIDE 20 ML/HR: 0.9 INJECTION, SOLUTION INTRAVENOUS at 08:27

## 2022-03-22 RX ADMIN — OBINUTUZUMAB 1000 MG: 1000 INJECTION, SOLUTION, CONCENTRATE INTRAVENOUS at 09:51

## 2022-03-22 RX ADMIN — DIPHENHYDRAMINE HYDROCHLORIDE 25 MG: 50 INJECTION, SOLUTION INTRAMUSCULAR; INTRAVENOUS at 08:58

## 2022-03-22 NOTE — PROGRESS NOTES
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

## 2022-03-22 NOTE — PROGRESS NOTES
Pt arrived to unit without compliant  Pt tolerated treatment without incident  AVS declined, but aware of future appts  Pt left unit in stable condition

## 2022-03-24 ENCOUNTER — HOSPITAL ENCOUNTER (OUTPATIENT)
Dept: CT IMAGING | Facility: HOSPITAL | Age: 67
Discharge: HOME | End: 2022-03-24
Attending: INTERNAL MEDICINE
Payer: MEDICARE

## 2022-03-24 DIAGNOSIS — F17.211 CIGARETTE NICOTINE DEPENDENCE IN REMISSION: ICD-10-CM

## 2022-03-24 PROCEDURE — 71271 CT THORAX LUNG CANCER SCR C-: CPT

## 2022-03-25 ENCOUNTER — OFFICE VISIT (OUTPATIENT)
Dept: FAMILY MEDICINE CLINIC | Facility: CLINIC | Age: 67
End: 2022-03-25
Payer: MEDICARE

## 2022-03-25 VITALS
OXYGEN SATURATION: 97 % | RESPIRATION RATE: 18 BRPM | TEMPERATURE: 97.9 F | HEIGHT: 73 IN | HEART RATE: 81 BPM | SYSTOLIC BLOOD PRESSURE: 140 MMHG | WEIGHT: 224.2 LBS | DIASTOLIC BLOOD PRESSURE: 76 MMHG | BODY MASS INDEX: 29.72 KG/M2

## 2022-03-25 DIAGNOSIS — J45.909 MODERATE ASTHMA, UNSPECIFIED WHETHER COMPLICATED, UNSPECIFIED WHETHER PERSISTENT: ICD-10-CM

## 2022-03-25 DIAGNOSIS — T38.0X5S STEROID-INDUCED DIABETES MELLITUS, SEQUELA (HCC): ICD-10-CM

## 2022-03-25 DIAGNOSIS — E09.9 STEROID-INDUCED DIABETES MELLITUS, SEQUELA (HCC): ICD-10-CM

## 2022-03-25 DIAGNOSIS — F41.9 ANXIETY DISORDER, UNSPECIFIED TYPE: ICD-10-CM

## 2022-03-25 DIAGNOSIS — J44.9 CHRONIC OBSTRUCTIVE PULMONARY DISEASE, UNSPECIFIED COPD TYPE (HCC): ICD-10-CM

## 2022-03-25 DIAGNOSIS — Z00.00 MEDICARE ANNUAL WELLNESS VISIT, SUBSEQUENT: ICD-10-CM

## 2022-03-25 DIAGNOSIS — C91.10 CLL (CHRONIC LYMPHOCYTIC LEUKEMIA) (HCC): Primary | ICD-10-CM

## 2022-03-25 DIAGNOSIS — K21.9 GASTROESOPHAGEAL REFLUX DISEASE WITHOUT ESOPHAGITIS: ICD-10-CM

## 2022-03-25 DIAGNOSIS — F33.9 DEPRESSION, RECURRENT (HCC): ICD-10-CM

## 2022-03-25 DIAGNOSIS — E78.2 MIXED HYPERLIPIDEMIA: ICD-10-CM

## 2022-03-25 PROCEDURE — 1123F ACP DISCUSS/DSCN MKR DOCD: CPT | Performed by: FAMILY MEDICINE

## 2022-03-25 PROCEDURE — G0439 PPPS, SUBSEQ VISIT: HCPCS | Performed by: FAMILY MEDICINE

## 2022-03-25 PROCEDURE — 99215 OFFICE O/P EST HI 40 MIN: CPT | Performed by: FAMILY MEDICINE

## 2022-03-25 NOTE — PROGRESS NOTES
Assessment/Plan:   1  CLL (chronic lymphocytic leukemia) ([facility] )  Patient appears clinically stable today  He does follow up with Hematology/Oncology regularly  He has been on and infusion treatment  Continue with his current treatment as well as his regular follow-up  - CBC; Future    2  CAD/Mixed hyperlipidemia  Patient appears clinically and hemodynamically stable  He denies any chest pain or palpitations  At this time, he does follow with Cardiology  Continue with his current treatment of aspirin, amlodipine, Lasix, losartan Unclear most recent control  At this time, continue with a strict low-fat/low-cholesterol diet as well as his current treatment with fenofibrate  Patient has been highly intolerant of all statins   - Comprehensive metabolic panel; Future  - Lipid Panel with Direct LDL reflex; Future    3  Chronic obstructive pulmonary disease, unspecified COPD type ([place] [address] )  Stable  He denies any recent exacerbations  At this time, continue with his current treatment of albuterol as well as his Dulera  4  Gastroesophageal reflux disease without esophagitis  Stable  At this time, continue with dietary trigger avoidance as well as his current treatment with pantoprazole  Patient has had strictures in the past that needed dilation  Continue with regular follow-up with Gastroenterology  5  Depression, recurrent (HCC)/Anxiety disorder, unspecified type  Patient's mood appears overall stable  Unfortunately he has grieving from the loss of his son  At this time, continue with his current treatment of lorazepam as well as his citalopram   PDMP does not show any abnormalities  - TSH, 3rd generation with Free T4 reflex; Future    6  Steroid-induced diabetes mellitus, sequela ([place] [address] )  Following with endocrinology  Patient's A1c has been minimally elevated  At this time, continue with his current treatment of his [place] as well as his Humalog    Follow up with patient in 6 months   - Hemoglobin A1C; Future               Diagnoses and all orders for this visit:    CLL (chronic lymphocytic leukemia) ([facility] )  -     CBC; Future    Mixed hyperlipidemia  -     Comprehensive metabolic panel; Future  -     Lipid Panel with Direct LDL reflex; Future    Chronic obstructive pulmonary disease, unspecified COPD type ([facility] )    Gastroesophageal reflux disease without esophagitis    Depression, recurrent (HCC)  -     TSH, 3rd generation with Free T4 reflex; Future    Moderate asthma, unspecified whether complicated, unspecified whether persistent    Steroid-induced diabetes mellitus, sequela (HCC)  -     Hemoglobin A1C; Future    Anxiety disorder, unspecified type          Subjective:       Chief Complaint   Patient presents with    Medicare Wellness Visit     subsequent     Establish Care      Patient ID: Marcelo Zhang is a 78 y o  male presents today as a new patient to establish care  He has CLL, dyslipidemia, says COPD, GERD, depression with anxiety, steroid induced diabetes  As well as coronary artery disease  He has been taking his medications regularly  He denies adverse reactions with medications  He has been following with many specialists including his hematologist/oncologist, Cardiology, Endocrinology as well as pulmonology most recent  HPI    Review of Systems   Constitutional: Negative for activity change, chills, fatigue and fever  HENT: Negative for congestion, ear pain, sinus pressure and sore throat  Eyes: Negative for redness, itching and visual disturbance  Respiratory: Negative for cough and shortness of breath  Cardiovascular: Negative for chest pain and palpitations  Gastrointestinal: Negative for abdominal pain, diarrhea and nausea  Endocrine: Negative for cold intolerance and heat intolerance  Genitourinary: Negative for dysuria, flank pain and frequency  Musculoskeletal: Negative for arthralgias, back pain, gait problem and myalgias  Skin: Negative for color change  Allergic/Immunologic: Negative for environmental allergies  Neurological: Negative for dizziness, numbness and headaches  Psychiatric/Behavioral: Negative for behavioral problems and sleep disturbance  The following portions of the patient's history were reviewed and updated as appropriate : past family history, past medical history, past social history and past surgical history      Current Outpatient Medications:     amLODIPine (NORVASC) 10 mg tablet, Take 1 tablet (10 mg total) by mouth daily, Disp: 90 tablet, Rfl: 3    aspirin 81 MG tablet, Take 81 mg by mouth every other day Every other day , Disp: , Rfl:     citalopram (CeleXA) 40 mg tablet, TAKE 1 TABLET DAILY, Disp: 90 tablet, Rfl: 5    fenofibrate (TRICOR) 145 mg tablet, TAKE 1 TABLET DAILY, Disp: 90 tablet, Rfl: 5    folic acid (FOLVITE) 1 mg tablet, Take 800 mcg by mouth daily , Disp: , Rfl:     furosemide (LASIX) 40 mg tablet, TAKE 1 TABLET DAILY, Disp: 90 tablet, Rfl: 5    gabapentin (NEURONTIN) 600 MG tablet, Take 1 tablet (600 mg total) by mouth daily, Disp: 90 tablet, Rfl: 6    glucose monitoring kit (FREESTYLE) monitoring kit, 1 each by Does not apply route as needed ( ) E 11 9, Disp: 1 each, Rfl: 0    insulin degludec (Tresiba FlexTouch) 100 units/mL injection pen, Inject 15 Units under the skin daily at bedtime, Disp: 15 mL, Rfl: 1    insulin lispro (HumaLOG) 100 units/mL injection pen, Use 5-10 units before meals as needed for steroid induced hyperglycemia (Patient taking differently: Use 5-17 units before meals as needed for steroid induced hyperglycemia ), Disp: 15 mL, Rfl: 0    Insulin Pen Needle (BD Pen Needle Inez U/F) 32G X 4 MM MISC, Use 3 (three) times a day, Disp: 300 each, Rfl: 1    Insulin Syringe-Needle U-100 30G X 1/2" 0 3 ML MISC, by Does not apply route 2 (two) times a day, Disp: 100 each, Rfl: 6    Lancets (FREESTYLE) lancets, Use as instructed--test 2 times a day  E 11 9, Disp: 100 each, Rfl: 0   LORazepam (ATIVAN) 0 5 mg tablet, Take 1 tablet (0 5 mg total) by mouth daily as needed for anxiety, Disp: 30 tablet, Rfl: 0    losartan (COZAAR) 100 MG tablet, TAKE 1 TABLET DAILY, Disp: 90 tablet, Rfl: 5    mometasone-formoterol (Dulera) 200-5 MCG/ACT inhaler, Inhale 2 puffs 2 (two) times a day Rinse mouth after use , Disp: 13 g, Rfl: 1    multivitamin (THERAGRAN) TABS, Take 1 tablet by mouth daily, Disp: , Rfl:     naloxone (NARCAN) 4 mg/0 1 mL nasal spray, Administer 1 spray into a nostril   If no response after 2-3 minutes, give another dose in the other nostril using a new spray , Disp: 1 each, Rfl: 1    nystatin (MYCOSTATIN) 500,000 units/5 mL suspension, Apply 2 mL (200,000 Units total) to the mouth or throat 4 (four) times a day, Disp: 473 mL, Rfl: 0    obinutuzumab (GAZYVA) 1000 mg/40 mL SOLN, Infuse into a venous catheter, Disp: , Rfl:     oxyCODONE (ROXICODONE) 10 MG TABS, Take 1 tablet (10 mg total) by mouth every 6 (six) hours as needed for moderate pain For ongoing pain controlMax Daily Amount: 40 mg, Disp: 90 tablet, Rfl: 0    pantoprazole (PROTONIX) 40 mg tablet, TAKE 1 TABLET DAILY, Disp: 90 tablet, Rfl: 5    Potassium (POTASSIMIN PO), Take 20 mEq by mouth daily  , Disp: , Rfl:     predniSONE 20 mg tablet, 20 mg one pill a day for 3 days then 10 mg po a day for 3 days then 5 mg po daily for 3 days, Disp: 20 tablet, Rfl: 0    predniSONE 5 mg tablet, TAKE 1 TABLET DAILY, Disp: 90 tablet, Rfl: 5    Ventolin  (90 Base) MCG/ACT inhaler, Inhale 1 puff 4 (four) times a day, Disp: 36 g, Rfl: 5    zolpidem (AMBIEN) 5 mg tablet, Take 1 tablet (5 mg total) by mouth daily at bedtime as needed for sleep, Disp: 30 tablet, Rfl: 0    acyclovir (ZOVIRAX) 400 MG tablet, TAKE 1 TABLET TWICE A DAY, Disp: 60 tablet, Rfl: 11    albuterol (2 5 mg/3 mL) 0 083 % nebulizer solution, Take 3 mL (2 5 mg total) by nebulization every 6 (six) hours as needed for wheezing or shortness of breath (Patient not taking: Reported on 3/2/2022 ), Disp: 180 mL, Rfl: 5    benzonatate (TESSALON) 200 MG capsule, Take 1 capsule (200 mg total) by mouth 3 (three) times a day as needed for cough, Disp: 20 capsule, Rfl: 0    guaifenesin-codeine (GUAIFENESIN AC) 100-10 MG/5ML liquid, Take 5 mL by mouth 3 (three) times a day as needed for cough, Disp: 236 mL, Rfl: 0    Ibrutinib 140 MG TABS, Take 140 mg by mouth daily (Patient not taking: Reported on 3/9/2022 ), Disp: 30 tablet, Rfl: 11    Ibrutinib 140 MG TABS, Take 140 mg by mouth daily, Disp: 30 tablet, Rfl: 6    sodium chloride, PF, 0 9 %, 10 mL by Intracatheter route see administration instructions 10mL into port before and after ampicillin doses, Disp: , Rfl: 0         Objective:         Vitals:    03/25/22 1149   BP: 140/76   BP Location: Right arm   Patient Position: Sitting   Cuff Size: Large   Pulse: 81   Resp: 18   Temp: 97 9 °F (36 6 °C)   TempSrc: Tympanic   SpO2: 97%   Weight: 102 kg (224 lb 3 2 oz)   Height: 6' 1" (1 854 m)     Physical Exam  Vitals reviewed  Constitutional:       Appearance: He is well-developed  HENT:      Head: Normocephalic and atraumatic  Nose: Nose normal       Mouth/Throat:      Pharynx: No oropharyngeal exudate  Eyes:      General: No scleral icterus  Right eye: No discharge  Left eye: No discharge  Pupils: Pupils are equal, round, and reactive to light  Neck:      Trachea: No tracheal deviation  Cardiovascular:      Rate and Rhythm: Normal rate and regular rhythm  Pulses:           Dorsalis pedis pulses are 2+ on the right side and 2+ on the left side  Posterior tibial pulses are 2+ on the right side and 2+ on the left side  Heart sounds: Normal heart sounds  No murmur heard  No friction rub  No gallop  Pulmonary:      Effort: Pulmonary effort is normal  No respiratory distress  Breath sounds: Normal breath sounds  No wheezing or rales     Abdominal:      General: Bowel sounds are normal  There is no distension  Palpations: Abdomen is soft  Tenderness: There is no abdominal tenderness  There is no guarding or rebound  Musculoskeletal:         General: Normal range of motion  Cervical back: Normal range of motion and neck supple  Lymphadenopathy:      Head:      Right side of head: No submental or submandibular adenopathy  Left side of head: No submental or submandibular adenopathy  Cervical: No cervical adenopathy  Right cervical: No superficial, deep or posterior cervical adenopathy  Left cervical: No superficial, deep or posterior cervical adenopathy  Skin:     General: Skin is warm and dry  Findings: No erythema  Neurological:      Mental Status: He is alert and oriented to person, place, and time  Cranial Nerves: No cranial nerve deficit  Sensory: No sensory deficit  Psychiatric:         Mood and Affect: Mood is not anxious or depressed  Speech: Speech normal          Behavior: Behavior normal          Thought Content:  Thought content normal          Judgment: Judgment normal

## 2022-03-25 NOTE — PATIENT INSTRUCTIONS
Medicare Preventive Visit Patient Instructions  Thank you for completing your Welcome to Medicare Visit or Medicare Annual Wellness Visit today  Your next wellness visit will be due in one year (3/26/2023)  The screening/preventive services that you may require over the next 5-10 years are detailed below  Some tests may not apply to you based off risk factors and/or age  Screening tests ordered at today's visit but not completed yet may show as past due  Also, please note that scanned in results may not display below  Preventive Screenings:  Service Recommendations Previous Testing/Comments   Colorectal Cancer Screening  · Colonoscopy    · Fecal Occult Blood Test (FOBT)/Fecal Immunochemical Test (FIT)  · Fecal DNA/Cologuard Test  · Flexible Sigmoidoscopy Age: 54-65 years old   Colonoscopy: every 10 years (May be performed more frequently if at higher risk)  OR  FOBT/FIT: every 1 year  OR  Cologuard: every 3 years  OR  Sigmoidoscopy: every 5 years  Screening may be recommended earlier than age 48 if at higher risk for colorectal cancer  Also, an individualized decision between you and your healthcare provider will decide whether screening between the ages of 74-80 would be appropriate   Colonoscopy: 01/14/2016  FOBT/FIT: Not on file  Cologuard: Not on file  Sigmoidoscopy: Not on file    Screening Current     Prostate Cancer Screening Individualized decision between patient and health care provider in men between ages of 53-78   Medicare will cover every 12 months beginning on the day after your 50th birthday PSA: No results in last 5 years           Hepatitis C Screening Once for adults born between [birth year] and 1965  More frequently in patients at high risk for Hepatitis C Hep C Antibody: Not on file        Diabetes Screening 1-2 times per year if you're at risk for diabetes or have pre-diabetes Fasting glucose: 149 mg/dL   A1C: 7 6 %    Screening Not Indicated  History Diabetes   Cholesterol Screening Once every 5 years if you don't have a lipid disorder  May order more often based on risk factors  Lipid panel: 11/30/2021    Screening Not Indicated  History Lipid Disorder      Other Preventive Screenings Covered by Medicare:  1  Abdominal Aortic Aneurysm (AAA) Screening: covered once if your at risk  You're considered to be at risk if you have a family history of AAA or a male between the age of 73-68 who smoking at least 100 cigarettes in your lifetime  2  Lung Cancer Screening: covers low dose CT scan once per year if you meet all of the following conditions: (1) Age 50-69; (2) No signs or symptoms of lung cancer; (3) Current smoker or have quit smoking within the last 15 years; (4) You have a tobacco smoking history of at least 30 pack years (packs per day x number of years you smoked); (5) You get a written order from a healthcare provider  3  Glaucoma Screening: covered annually if you're considered high risk: (1) You have diabetes OR (2) Family history of glaucoma OR (3)  aged 48 and older OR (3)  American aged 72 and older  3  Osteoporosis Screening: covered every 2 years if you meet one of the following conditions: (1) Have a vertebral abnormality; (2) On glucocorticoid therapy for more than 3 months; (3) Have primary hyperparathyroidism; (4) On osteoporosis medications and need to assess response to drug therapy  5  HIV Screening: covered annually if you're between the age of 12-76  Also covered annually if you are younger than 13 and older than 72 with risk factors for HIV infection  For pregnant patients, it is covered up to 3 times per pregnancy      Immunizations:  Immunization Recommendations   Influenza Vaccine Annual influenza vaccination during flu season is recommended for all persons aged >= 6 months who do not have contraindications   Pneumococcal Vaccine (Prevnar and Pneumovax)  * Prevnar = PCV13  * Pneumovax = PPSV23 Adults 25-60 years old: 1-3 doses may be recommended based on certain risk factors  Adults 72 years old: Prevnar (PCV13) vaccine recommended followed by Pneumovax (PPSV23) vaccine  If already received PPSV23 since turning 65, then PCV13 recommended at least one year after PPSV23 dose  Hepatitis B Vaccine 3 dose series if at intermediate or high risk (ex: diabetes, end stage renal disease, liver disease)   Tetanus (Td) Vaccine - COST NOT COVERED BY MEDICARE PART B Following completion of primary series, a booster dose should be given every 10 years to maintain immunity against tetanus  Td may also be given as tetanus wound prophylaxis  Tdap Vaccine - COST NOT COVERED BY MEDICARE PART B Recommended at least once for all adults  For pregnant patients, recommended with each pregnancy  Shingles Vaccine (Shingrix) - COST NOT COVERED BY MEDICARE PART B  2 shot series recommended in those aged 48 and above     Health Maintenance Due:      Topic Date Due    Hepatitis C Screening  Never done    Lung Cancer Screening  06/27/2020    Colorectal Cancer Screening  01/14/2026     Immunizations Due:      Topic Date Due    COVID-19 Vaccine (1) Never done    DTaP,Tdap,and Td Vaccines (1 - Tdap) Never done    Influenza Vaccine (1) 09/01/2021     Advance Directives   What are advance directives? Advance directives are legal documents that state your wishes and plans for medical care  These plans are made ahead of time in case you lose your ability to make decisions for yourself  Advance directives can apply to any medical decision, such as the treatments you want, and if you want to donate organs  What are the types of advance directives? There are many types of advance directives, and each state has rules about how to use them  You may choose a combination of any of the following:  · Living will: This is a written record of the treatment you want  You can also choose which treatments you do not want, which to limit, and which to stop at a certain time   This includes surgery, medicine, IV fluid, and tube feedings  · Durable power of  for healthcare [facility]): This is a written record that states who you want to make healthcare choices for you when you are unable to make them for yourself  This person, called a proxy, is usually a family member or a friend  You may choose more than 1 proxy  · Do not resuscitate (DNR) order:  A DNR order is used in case your heart stops beating or you stop breathing  It is a request not to have certain forms of treatment, such as CPR  A DNR order may be included in other types of advance directives  · Medical directive: This covers the care that you want if you are in a coma, near death, or unable to make decisions for yourself  You can list the treatments you want for each condition  Treatment may include pain medicine, surgery, blood transfusions, dialysis, IV or tube feedings, and a ventilator (breathing machine)  · Values history: This document has questions about your views, beliefs, and how you feel and think about life  This information can help others choose the care that you would choose  Why are advance directives important? An advance directive helps you control your care  Although spoken wishes may be used, it is better to have your wishes written down  Spoken wishes can be misunderstood, or not followed  Treatments may be given even if you do not want them  An advance directive may make it easier for your family to make difficult choices about your care  Weight Management   Why it is important to manage your weight:  Being overweight increases your risk of health conditions such as heart disease, high blood pressure, type 2 diabetes, and certain types of cancer  It can also increase your risk for osteoarthritis, sleep apnea, and other respiratory problems  Aim for a slow, steady weight loss  Even a small amount of weight loss can lower your risk of health problems    How to lose weight safely:  A safe and healthy way to lose weight is to eat fewer calories and get regular exercise  You can lose up about 1 pound a week by decreasing the number of calories you eat by 500 calories each day  Healthy meal plan for weight management:  A healthy meal plan includes a variety of foods, contains fewer calories, and helps you stay healthy  A healthy meal plan includes the following:  · Eat whole-grain foods more often  A healthy meal plan should contain fiber  Fiber is the part of grains, fruits, and vegetables that is not broken down by your body  Whole-grain foods are healthy and provide extra fiber in your diet  Some examples of whole-grain foods are whole-wheat breads and pastas, oatmeal, brown rice, and bulgur  · Eat a variety of vegetables every day  Include dark, leafy greens such as spinach, kale, eleanor greens, and mustard greens  Eat yellow and orange vegetables such as carrots, sweet potatoes, and winter squash  · Eat a variety of fruits every day  Choose fresh or canned fruit (canned in its own juice or light syrup) instead of juice  Fruit juice has very little or no fiber  · Eat low-fat dairy foods  Drink fat-free (skim) milk or 1% milk  Eat fat-free yogurt and low-fat cottage cheese  Try low-fat cheeses such as mozzarella and other reduced-fat cheeses  · Choose meat and other protein foods that are low in fat  Choose beans or other legumes such as split peas or lentils  Choose fish, skinless poultry (chicken or turkey), or lean cuts of red meat (beef or pork)  Before you cook meat or poultry, cut off any visible fat  · Use less fat and oil  Try baking foods instead of frying them  Add less fat, such as margarine, sour cream, regular salad dressing and mayonnaise to foods  Eat fewer high-fat foods  Some examples of high-fat foods include french fries, doughnuts, ice cream, and cakes  · Eat fewer sweets  Limit foods and drinks that are high in sugar   This includes candy, cookies, regular soda, and sweetened drinks  Exercise:  Exercise at least 30 minutes per day on most days of the week  Some examples of exercise include walking, biking, dancing, and swimming  You can also fit in more physical activity by taking the stairs instead of the elevator or parking farther away from stores  Ask your healthcare provider about the best exercise plan for you  Narcotic (Opioid) Safety    Use narcotics safely:  · Take prescribed narcotics exactly as directed  · Do not give narcotics to others or take narcotics that belong to someone else  · Do not mix narcotics without medicines or alcohol  · Do not drive or operate heavy machinery after you take the narcotic  · Monitor for side effects and notify your healthcare provider if you experienced side effects such as nausea, sleepiness, itching, or trouble thinking clearly  Manage constipation:    Constipation is the most common side effect of narcotic medicine  Constipation is when you have hard, dry bowel movements, or you go longer than usual between bowel movements  Tell your healthcare provider about all changes in your bowel movements while you are taking narcotics  He or she may recommend laxative medicine to help you have a bowel movement  He or she may also change the kind of narcotic you are taking, or change when you take it  The following are more ways you can prevent or relieve constipation:    · Drink liquids as directed  You may need to drink extra liquids to help soften and move your bowels  Ask how much liquid to drink each day and which liquids are best for you  · Eat high-fiber foods  This may help decrease constipation by adding bulk to your bowel movements  High-fiber foods include fruits, vegetables, whole-grain breads and cereals, and beans  Your healthcare provider or dietitian can help you create a high-fiber meal plan  Your provider may also recommend a fiber supplement if you cannot get enough fiber from food  · Exercise regularly    Regular physical activity can help stimulate your intestines  Walking is a good exercise to prevent or relieve constipation  Ask which exercises are best for you  · Schedule a time each day to have a bowel movement  This may help train your body to have regular bowel movements  Bend forward while you are on the toilet to help move the bowel movement out  Sit on the toilet for at least 10 minutes, even if you do not have a bowel movement  Store narcotics safely:   · Store narcotics where others cannot easily get them  Keep them in a locked cabinet or secure area  Do not  keep them in a purse or other bag you carry with you  A person may be looking for something else and find the narcotics  · Make sure narcotics are stored out of the reach of children  A child can easily overdose on narcotics  Narcotics may look like candy to a small child  The best way to dispose of narcotics: The laws vary by country and area  In the [place], the best way is to return the narcotics through a take-back program  This program is offered by the [employer])  The following are options for using the program:  · Take the narcotics to a TOMMY collection site  The site is often a law enforcement center  Call your local law enforcement center for scheduled take-back days in your area  You will be given information on where to go if the collection site is in a different location  · Take the narcotics to an approved pharmacy or hospital   A pharmacy or hospital may be set up as a collection site  You will need to ask if it is a TOMMY collection site if you were not directed there  A pharmacy or doctor's office may not be able to take back narcotics unless it is a TOMMY site  · Use a mail-back system  This means you are given containers to put the narcotics into  You will then mail them in the containers  · Use a take-back drop box  This is a place to leave the narcotics at any time   People and animals will not be able to get into the box  Your local law enforcement agency can tell you where to find a drop box in your area  Other ways to manage pain:   · Ask your healthcare provider about non-narcotic medicines to control pain  Nonprescription medicines include NSAIDs (such as ibuprofen) and acetaminophen  Prescription medicines include muscle relaxers, antidepressants, and steroids  · Pain may be managed without any medicines  Some ways to relieve pain include massage, aromatherapy, or meditation  Physical or occupational therapy may also help  For more information:   · Drug Enforcement Administration  [facility] [address]  Phone: 1- 959 - 665-7513  Web Address: [facility] gov/drug_disposal/    · [address]  and Drug Administration  [facility] , [address] [place] [address]  Phone: 4- 086 - 991-0847  Web Address: http://[employer]/     © Copyright [employer] 2018 Information is for End User's use only and may not be sold, redistributed or otherwise used for commercial purposes   All illustrations and images included in CareNotes® are the copyrighted property of [employer]  or [address] [place] [address] [employer]

## 2022-03-25 NOTE — PROGRESS NOTES
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[facility] )    Relevant Orders    Hemoglobin A1C       Respiratory    Chronic obstructive pulmonary disease (HCC)       Other    CLL (chronic lymphocytic leukemia) (HCC) - Primary    Relevant Orders    CBC    Hyperlipidemia    Relevant Orders    Comprehensive metabolic panel    Lipid Panel with Direct LDL reflex    Depression, recurrent (HCC)    Relevant Orders    TSH, 3rd generation with Free T4 reflex      Other Visit Diagnoses     Moderate asthma, unspecified whether complicated, unspecified whether persistent        Anxiety disorder, unspecified type               Preventive health issues were discussed with patient, and age appropriate screening tests were ordered as noted in patient's After Visit Summary  Personalized health advice and appropriate referrals for health education or preventive services given if needed, as noted in patient's After Visit Summary       History of Present Illness:     Patient presents for Medicare Annual Wellness visit    Patient Care Team:  Mireya Davis DO as PCP - General (Family Medicine)  Kemi Barakat MD as PCP - Endocrinology (Endocrinology)  MD Myriam Agee MD Willia Lecher, MD Carmelo Haws, MD Thena Guiles, MD Vernia Ask, MD (Cardiology)  Ivan Donohue MD (Orthopedic Surgery)  Kemi Barakat MD (Endocrinology)  Mee Reed MD (Cardiology)  LUKAS Malloy as  Care Manager (Care Coordination)     Problem List:     Patient Active Problem List   Diagnosis    CAD (coronary artery disease)    CLL (chronic lymphocytic leukemia) ([facility] )    Hyperlipidemia    Hypertension    History of TIA (transient ischemic attack)    Esophagitis, reflux    Candida esophagitis ([place] [address] )    Chronic kidney disease  H/O blood clots    Hemolytic anemia (HCC)    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    Diabetes mellitus ([place] [address] )    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HCC)    Chronic right shoulder pain    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[place] [address] )    Ulcer of esophagus without bleeding    Esophageal stricture    Dysphagia    Esophageal dysphagia    Acute bursitis of right shoulder    Hypogammaglobulinemia, acquired ([place] [address] )    Autoimmune hemolytic anemia (HCC)    Right upper quadrant abdominal pain    Age related osteoporosis    Chronic obstructive pulmonary disease (HCC)    Depression, recurrent (HCC)      Past Medical and Surgical History:     Past Medical History:   Diagnosis Date    Allergic     Anemia     Arthritis     CAD (coronary artery disease) 2004    Cancer ([place] [address] )     Leukemia    Cellulitis of scalp     CKD (chronic kidney disease) stage 3, GFR 30-59 ml/min (HCC)     CLL (chronic lymphocytic leukemia) (HCC)     COPD (chronic obstructive pulmonary disease) (HCC)     Diabetes mellitus (HCC)     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    Dyslipidemia     Edema extremities     Esophageal ulcer     H/O blood clots     Hemolytic anemia (HCC)     Hiatal hernia     History of TIA (transient ischemic attack)     Hyperlipidemia     Hypertension     Listeria infection 2018    Multiple gastric ulcers     Myocardial infarction ([place] [address] ) 2004    acute    Neutropenia (HCC)     Obese     Recurrent sinusitis     Sleep apnea     Thrombocytopenia (HCC)     Vertigo      Past Surgical History:   Procedure Laterality Date    ARTHROSCOPIC REPAIR ACL      lt knee    CARDIAC SURGERY      cardiac cath with stent    FOOT SURGERY      IR PORT CHECK  5/17/2019    KNEE ARTHROSCOPY      OTHER SURGICAL HISTORY      cardiac cath lesion 1, 1st adjunct treat device: stent    PORTACATH PLACEMENT      NC ESOPHAGOGASTRODUODENOSCOPY TRANSORAL DIAGNOSTIC N/A 10/5/2017    Procedure: ESOPHAGOGASTRODUODENOSCOPY (EGD) with bx;  Surgeon: Maura Valdez DO;  [facility]: [facility]; Service: Gastroenterology    NC ESOPHAGOGASTRODUODENOSCOPY TRANSORAL DIAGNOSTIC N/A 3/8/2018    Procedure: ESOPHAGOGASTRODUODENOSCOPY (EGD) with biopsy;  Surgeon: Maura Valdez DO;  [facility]; Service: Gastroenterology    NC ESOPHAGOGASTRODUODENOSCOPY TRANSORAL DIAGNOSTIC N/A 6/20/2018    Procedure: ESOPHAGOGASTRODUODENOSCOPY (EGD) with Dilation;  Surgeon: Maura Valdez DO;  [facility]; Service: Gastroenterology    NC ESOPHAGOGASTRODUODENOSCOPY TRANSORAL DIAGNOSTIC N/A 10/18/2018    Procedure: ESOPHAGOGASTRODUODENOSCOPY (EGD) with dilation;  Surgeon: Mauro Treviño MD;  [facility];   Service: Gastroenterology    NC ESOPHAGOSCOPY FLEXIBLE TRANSORAL WITH BIOPSY N/A 9/2/2016    Procedure: ESOPHAGOGASTRODUODENOSCOPY (EGD); ESOPHAGEAL DILATION; ESOPHAGEAL BIOPSY;  Surgeon: Amber Zhang MD;  [facility];  Service: Thoracic    TONSILLECTOMY      UPPER GASTROINTESTINAL ENDOSCOPY      x 6      Family History:     Family History   Problem Relation Age of Onset    Leukemia Mother         chronic    Colon polyps Mother         sidmoid    Parkinsonism Father       Social History:     Social History     Socioeconomic History    Marital status: /Civil Union     Spouse name: None    Number of children: None    Years of education: None    Highest education level: None   Occupational History    Occupation: [employer] Occupation: retired    Tobacco Use    Smoking status: Former Smoker     Packs/day: 2 00     Years: 33 00     Pack years: 66 00     Types: Cigarettes     Start date: 1978 Quit date:      Years since quittin 2    Smokeless tobacco: Never Used   Vaping Use    Vaping Use: Never used   Substance and Sexual Activity    Alcohol use:  Yes     Alcohol/week: 2 0 standard drinks     Types: 2 Cans of beer per week     Comment: social use as per [employer]    Drug use: No    Sexual activity: None   Other Topics Concern    None   Social History Narrative    None     Social Determinants of Health     Financial Resource Strain: Not on file   Food Insecurity: Not on file   Transportation Needs: Not on file   Physical Activity: Not on file   Stress: Not on file   Social Connections: Not on file   Intimate Partner Violence: Not on file   Housing Stability: Not on file      Medications and Allergies:     Current Outpatient Medications   Medication Sig Dispense Refill    amLODIPine (NORVASC) 10 mg tablet Take 1 tablet (10 mg total) by mouth daily 90 tablet 3    aspirin 81 MG tablet Take 81 mg by mouth every other day Every other day       citalopram (CeleXA) 40 mg tablet TAKE 1 TABLET DAILY 90 tablet 5    fenofibrate (TRICOR) 145 mg tablet TAKE 1 TABLET DAILY 90 tablet 5    folic acid (FOLVITE) 1 mg tablet Take 800 mcg by mouth daily       furosemide (LASIX) 40 mg tablet TAKE 1 TABLET DAILY 90 tablet 5    gabapentin (NEURONTIN) 600 MG tablet Take 1 tablet (600 mg total) by mouth daily 90 tablet 6    glucose monitoring kit (FREESTYLE) monitoring kit 1 each by Does not apply route as needed ( ) E 11 9 1 each 0    insulin degludec (Tresiba FlexTouch) 100 units/mL injection pen Inject 15 Units under the skin daily at bedtime 15 mL 1    insulin lispro (HumaLOG) 100 units/mL injection pen Use 5-10 units before meals as needed for steroid induced hyperglycemia (Patient taking differently: Use 5-17 units before meals as needed for steroid induced hyperglycemia ) 15 mL 0    Insulin Pen Needle (BD Pen Needle Inez U/F) 32G X 4 MM MISC Use 3 (three) times a day 300 each 1    Insulin Syringe-Needle U-100 30G X 1/2" 0 3 ML MISC by Does not apply route 2 (two) times a day 100 each 6    Lancets (FREESTYLE) lancets Use as instructed--test 2 times a day    E 11 9 100 each 0    LORazepam (ATIVAN) 0 5 mg tablet Take 1 tablet (0 5 mg total) by mouth daily as needed for anxiety 30 tablet 0    losartan (COZAAR) 100 MG tablet TAKE 1 TABLET DAILY 90 tablet 5    mometasone-formoterol (Dulera) 200-5 MCG/ACT inhaler Inhale 2 puffs 2 (two) times a day Rinse mouth after use  13 g 1    multivitamin (THERAGRAN) TABS Take 1 tablet by mouth daily      naloxone (NARCAN) 4 mg/0 1 mL nasal spray Administer 1 spray into a nostril  If no response after 2-3 minutes, give another dose in the other nostril using a new spray   1 each 1    nystatin (MYCOSTATIN) 500,000 units/5 mL suspension Apply 2 mL (200,000 Units total) to the mouth or throat 4 (four) times a day 473 mL 0    obinutuzumab (GAZYVA) 1000 mg/40 mL SOLN Infuse into a venous catheter      oxyCODONE (ROXICODONE) 10 MG TABS Take 1 tablet (10 mg total) by mouth every 6 (six) hours as needed for moderate pain For ongoing pain controlMax Daily Amount: 40 mg 90 tablet 0    pantoprazole (PROTONIX) 40 mg tablet TAKE 1 TABLET DAILY 90 tablet 5    Potassium (POTASSIMIN PO) Take 20 mEq by mouth daily        predniSONE 20 mg tablet 20 mg one pill a day for 3 days then 10 mg po a day for 3 days then 5 mg po daily for 3 days 20 tablet 0    predniSONE 5 mg tablet TAKE 1 TABLET DAILY 90 tablet 5    Ventolin  (90 Base) MCG/ACT inhaler Inhale 1 puff 4 (four) times a day 36 g 5    zolpidem (AMBIEN) 5 mg tablet Take 1 tablet (5 mg total) by mouth daily at bedtime as needed for sleep 30 tablet 0    acyclovir (ZOVIRAX) 400 MG tablet TAKE 1 TABLET TWICE A DAY 60 tablet 11    albuterol (2 5 mg/3 mL) 0 083 % nebulizer solution Take 3 mL (2 5 mg total) by nebulization every 6 (six) hours as needed for wheezing or shortness of breath (Patient not taking: Reported on 3/2/2022 ) 180 mL 5    benzonatate (TESSALON) 200 MG capsule Take 1 capsule (200 mg total) by mouth 3 (three) times a day as needed for cough 20 capsule 0    guaifenesin-codeine (GUAIFENESIN AC) 100-10 MG/5ML liquid Take 5 mL by mouth 3 (three) times a day as needed for cough 236 mL 0    Ibrutinib 140 MG TABS Take 140 mg by mouth daily (Patient not taking: Reported on 3/9/2022 ) 30 tablet 11    Ibrutinib 140 MG TABS Take 140 mg by mouth daily 30 tablet 6    sodium chloride, PF, 0 9 % 10 mL by Intracatheter route see administration instructions 10mL into port before and after ampicillin doses  0     No current facility-administered medications for this visit  Allergies   Allergen Reactions    Heparin Other (See Comments)     Other reaction(s): Blood Disorders  clot    Macrolides And Ketolides Other (See Comments)     Interacts with other meds he is taking    Simvastatin Myalgia      Immunizations:     Immunization History   Administered Date(s) Administered    INFLUENZA 12/05/2006, 11/02/2007, 10/29/2010, 03/20/2019    Pneumococcal Conjugate 13-Valent 12/09/2019    Pneumococcal Polysaccharide PPV23 03/15/2021      Health Maintenance:         Topic Date Due    Hepatitis C Screening  Never done    Lung Cancer Screening  06/27/2020    Colorectal Cancer Screening  01/14/2026         Topic Date Due    COVID-19 Vaccine (1) Never done    DTaP,Tdap,and Td Vaccines (1 - Tdap) Never done    Influenza Vaccine (1) 09/01/2021      Medicare Health Risk Assessment:     /76 (BP Location: Right arm, Patient Position: Sitting, Cuff Size: Large)   Pulse 81   Temp 97 9 °F (36 6 °C) (Tympanic)   Resp 18   Ht 6' 1" (1 854 m)   Wt 102 kg (224 lb 3 2 oz)   SpO2 97%   BMI 29 58 kg/m²      Chela Martinez is here for his Subsequent Wellness visit  Last Medicare Wellness visit information reviewed, patient interviewed, no change since last AWV  Health Risk Assessment:   Patient rates overall health as poor   Patient feels that their physical health rating is same  Patient is very satisfied with their life  Eyesight was rated as same  Hearing was rated as same  Patient feels that their emotional and mental health rating is same  Patients states they are never, rarely angry  Patient states they are sometimes unusually tired/fatigued  Pain experienced in the last 7 days has been some  Patient's pain rating has been 5/10  Patient states that he has experienced no weight loss or gain in last 6 months  Depression Screening:   PHQ-9 Score: 0      Fall Risk Screening: In the past year, patient has experienced: no history of falling in past year      Home Safety:  Patient does not have trouble with stairs inside or outside of their home  Patient has working smoke alarms and has working carbon monoxide detector  Home safety hazards include: none  Nutrition:   Current diet is Regular  Medications:   Patient is currently taking over-the-counter supplements  OTC medications include: see medication list  Patient is able to manage medications  Activities of Daily Living (ADLs)/Instrumental Activities of Daily Living (IADLs):   Walk and transfer into and out of bed and chair?: Yes  Dress and groom yourself?: Yes    Bathe or shower yourself?: Yes    Feed yourself? Yes  Do your laundry/housekeeping?: Yes  Manage your money, pay your bills and track your expenses?: Yes  Make your own meals?: Yes    Do your own shopping?: Yes    Previous Hospitalizations:   Any hospitalizations or ED visits within the last 12 months?: No      Advance Care Planning:   Living will: Yes    Durable POA for healthcare:  Yes    Advanced directive: Yes    Advanced directive counseling given: Yes    Five wishes given: Yes    Patient declined ACP directive: No    End of Life Decisions reviewed with patient: Yes    Provider agrees with end of life decisions: Yes      Cognitive Screening:   Provider or family/friend/caregiver concerned regarding cognition?: No    PREVENTIVE SCREENINGS      Cardiovascular Screening:    General: Screening Not Indicated, History Lipid Disorder and Risks and Benefits Discussed      Diabetes Screening:     General: Screening Not Indicated, History Diabetes and Risks and Benefits Discussed      Colorectal Cancer Screening:     General: Screening Current      Prostate Cancer Screening:    General: Risks and Benefits Discussed    Due for: PSA      Osteoporosis Screening:    General: Screening Not Indicated, History Osteoporosis and Risks and Benefits Discussed      Abdominal Aortic Aneurysm (AAA) Screening:    Risk factors include: age between 73-67 yo and tobacco use        General: Risks and Benefits Discussed and Screening Current      Lung Cancer Screening:     General: Screening Current and Risks and Benefits Discussed      Hepatitis C Screening:    General: Risks and Benefits Discussed    Hep C Screening Accepted: Yes      Screening, Brief Intervention, and Referral to Treatment (SBIRT)    Screening  Typical number of drinks in a day: 0  Typical number of drinks in a week: 0  Interpretation: Low risk drinking behavior      AUDIT-C Screenin) How often did you have a drink containing alcohol in the past year? never  2) How many drinks did you have on a typical day when you were drinking in the past year? 0  3) How often did you have 6 or more drinks on one occasion in the past year? never    AUDIT-C Score: 0  Interpretation: Score 0-3 (male): Negative screen for alcohol misuse    Single Item Drug Screening:  How often have you used an illegal drug (including marijuana) or a prescription medication for non-medical reasons in the past year? never    Single Item Drug Screen Score: 0  Interpretation: Negative screen for possible drug use disorder    Review of Current Opioid Use    Opioid Risk Tool (ORT) Interpretation: Complete Opioid Risk Tool (ORT)      Meseret Elliott DO

## 2022-03-27 DIAGNOSIS — I10 ESSENTIAL HYPERTENSION: ICD-10-CM

## 2022-03-28 RX ORDER — AMLODIPINE BESYLATE 10 MG/1
TABLET ORAL
Qty: 90 TABLET | Refills: 5 | Status: SHIPPED | OUTPATIENT
Start: 2022-03-28

## 2022-04-05 ENCOUNTER — HOSPITAL ENCOUNTER (OUTPATIENT)
Dept: INFUSION CENTER | Facility: CLINIC | Age: 67
Discharge: HOME | End: 2022-04-05
Payer: MEDICARE

## 2022-04-05 VITALS
HEART RATE: 69 BPM | BODY MASS INDEX: 29.69 KG/M2 | RESPIRATION RATE: 18 BRPM | WEIGHT: 225 LBS | DIASTOLIC BLOOD PRESSURE: 90 MMHG | TEMPERATURE: 98.5 F | SYSTOLIC BLOOD PRESSURE: 164 MMHG

## 2022-04-05 DIAGNOSIS — C91.10 CLL (CHRONIC LYMPHOCYTIC LEUKEMIA) (HCC): Primary | ICD-10-CM

## 2022-04-05 DIAGNOSIS — D80.1 HYPOGAMMAGLOBULINEMIA, ACQUIRED (HCC): ICD-10-CM

## 2022-04-05 PROCEDURE — 96375 TX/PRO/DX INJ NEW DRUG ADDON: CPT

## 2022-04-05 PROCEDURE — 96366 THER/PROPH/DIAG IV INF ADDON: CPT

## 2022-04-05 PROCEDURE — 96365 THER/PROPH/DIAG IV INF INIT: CPT

## 2022-04-05 PROCEDURE — 96367 TX/PROPH/DG ADDL SEQ IV INF: CPT

## 2022-04-05 RX ORDER — SODIUM CHLORIDE 9 MG/ML
20 INJECTION, SOLUTION INTRAVENOUS ONCE
Status: COMPLETED | OUTPATIENT
Start: 2022-04-05 | End: 2022-04-05

## 2022-04-05 RX ORDER — ACETAMINOPHEN 325 MG/1
650 TABLET ORAL ONCE
Status: CANCELLED | OUTPATIENT
Start: 2022-05-03

## 2022-04-05 RX ORDER — ACETAMINOPHEN 325 MG/1
650 TABLET ORAL ONCE
Status: COMPLETED | OUTPATIENT
Start: 2022-04-05 | End: 2022-04-05

## 2022-04-05 RX ORDER — SODIUM CHLORIDE 9 MG/ML
20 INJECTION, SOLUTION INTRAVENOUS ONCE
Status: CANCELLED | OUTPATIENT
Start: 2022-05-03

## 2022-04-05 RX ADMIN — Medication 20 G: at 09:23

## 2022-04-05 RX ADMIN — ACETAMINOPHEN 650 MG: 325 TABLET, FILM COATED ORAL at 08:42

## 2022-04-05 RX ADMIN — DIPHENHYDRAMINE HYDROCHLORIDE 12.5 MG: 50 INJECTION, SOLUTION INTRAMUSCULAR; INTRAVENOUS at 08:41

## 2022-04-05 RX ADMIN — HYDROCORTISONE SODIUM SUCCINATE 100 MG: 100 INJECTION, POWDER, FOR SOLUTION INTRAMUSCULAR; INTRAVENOUS at 08:40

## 2022-04-05 RX ADMIN — SODIUM CHLORIDE 20 ML/HR: 0.9 INJECTION, SOLUTION INTRAVENOUS at 08:35

## 2022-04-10 ENCOUNTER — TELEPHONE (OUTPATIENT)
Dept: OTHER | Facility: OTHER | Age: 67
End: 2022-04-10

## 2022-04-10 NOTE — TELEPHONE ENCOUNTER
Patent called saying that he is running a fever and that is fever is fluctuating between 101 5-103 and now is spleen is hurting and he throw up concern

## 2022-04-11 ENCOUNTER — TELEPHONE (OUTPATIENT)
Dept: HEMATOLOGY ONCOLOGY | Facility: CLINIC | Age: 67
End: 2022-04-11

## 2022-04-11 NOTE — TELEPHONE ENCOUNTER
Left msg for patient to return my call to discuss phone call from last evening  Patient called regarding a fever

## 2022-04-11 NOTE — TELEPHONE ENCOUNTER
There is no note from Dr Desmond Galindo from on call but hopefully he spoke with him  Please follow up and check his status

## 2022-04-11 NOTE — TELEPHONE ENCOUNTER
----- Message from Shey Michele MA sent at 4/11/2022  9:35 AM EDT -----  Regarding: FW: Phone call to office last night    ----- Message -----  From: Blake Aldridge: 4/11/2022   9:32 AM EDT  To: [facility]  Subject: Phone call to office last night                  I called last night because i had a fever that was running high (101 9 -102+)  with vomiting  Took Aspirin  and cold compression  I never went to hospital  because i was feeling better  My fever broke and my spleen stop hurting

## 2022-04-11 NOTE — TELEPHONE ENCOUNTER
Telephone call spoke with Pt  He stated yesterday late afternoon is started feeling very cold and chills, highest temp 102 4  He was going to go to ER but waited to see how he felt  His spleen area, just below his ribs he had lots of pain  He took some tylenol and within a few hours was better  He didn't go to the ED last night because him and wife do not drive at night  He understands if happens again he needs to go to ED  Raymond herrera

## 2022-04-12 ENCOUNTER — APPOINTMENT (EMERGENCY)
Dept: CT IMAGING | Facility: HOSPITAL | Age: 67
DRG: 194 | End: 2022-04-12
Payer: MEDICARE

## 2022-04-12 ENCOUNTER — APPOINTMENT (EMERGENCY)
Dept: RADIOLOGY | Facility: HOSPITAL | Age: 67
DRG: 194 | End: 2022-04-12
Payer: MEDICARE

## 2022-04-12 ENCOUNTER — HOSPITAL ENCOUNTER (INPATIENT)
Facility: HOSPITAL | Age: 67
LOS: 3 days | Discharge: HOME | DRG: 194 | End: 2022-04-15
Attending: EMERGENCY MEDICINE | Admitting: INTERNAL MEDICINE
Payer: MEDICARE

## 2022-04-12 ENCOUNTER — TELEPHONE (OUTPATIENT)
Dept: HEMATOLOGY ONCOLOGY | Facility: CLINIC | Age: 67
End: 2022-04-12

## 2022-04-12 DIAGNOSIS — R50.9 FEVER: Primary | ICD-10-CM

## 2022-04-12 DIAGNOSIS — J18.9 BILATERAL PNEUMONIA: ICD-10-CM

## 2022-04-12 DIAGNOSIS — J18.9 MULTIFOCAL PNEUMONIA: ICD-10-CM

## 2022-04-12 LAB
ALBUMIN SERPL BCP-MCNC: 3.2 G/DL (ref 3.5–5)
ALP SERPL-CCNC: 54 U/L (ref 46–116)
ALT SERPL W P-5'-P-CCNC: 29 U/L (ref 12–78)
ANION GAP SERPL CALCULATED.3IONS-SCNC: 7 MMOL/L (ref 4–13)
APTT PPP: 29 SECONDS (ref 23–37)
AST SERPL W P-5'-P-CCNC: 20 U/L (ref 5–45)
BASOPHILS # BLD MANUAL: 0 THOUSAND/UL (ref 0–0.1)
BASOPHILS NFR MAR MANUAL: 0 % (ref 0–1)
BILIRUB DIRECT SERPL-MCNC: 0.14 MG/DL (ref 0–0.2)
BILIRUB SERPL-MCNC: 0.41 MG/DL (ref 0.2–1)
BILIRUB UR QL STRIP: NEGATIVE
BUN SERPL-MCNC: 20 MG/DL (ref 5–25)
CALCIUM SERPL-MCNC: 8.6 MG/DL (ref 8.3–10.1)
CHLORIDE SERPL-SCNC: 105 MMOL/L (ref 100–108)
CLARITY UR: CLEAR
CO2 SERPL-SCNC: 26 MMOL/L (ref 21–32)
COLOR UR: YELLOW
CREAT SERPL-MCNC: 1.27 MG/DL (ref 0.6–1.3)
EOSINOPHIL # BLD MANUAL: 0.35 THOUSAND/UL (ref 0–0.4)
EOSINOPHIL NFR BLD MANUAL: 6 % (ref 0–6)
ERYTHROCYTE [DISTWIDTH] IN BLOOD BY AUTOMATED COUNT: 13.8 % (ref 11.6–15.1)
FLUAV RNA RESP QL NAA+PROBE: NEGATIVE
FLUBV RNA RESP QL NAA+PROBE: NEGATIVE
GFR SERPL CREATININE-BSD FRML MDRD: 58 ML/MIN/1.73SQ M
GLUCOSE SERPL-MCNC: 167 MG/DL (ref 65–140)
GLUCOSE SERPL-MCNC: 197 MG/DL (ref 65–140)
GLUCOSE SERPL-MCNC: 244 MG/DL (ref 65–140)
GLUCOSE UR STRIP-MCNC: ABNORMAL MG/DL
HCT VFR BLD AUTO: 41.3 % (ref 36.5–49.3)
HGB BLD-MCNC: 13.5 G/DL (ref 12–17)
HGB UR QL STRIP.AUTO: NEGATIVE
INR PPP: 1.14 (ref 0.84–1.19)
KETONES UR STRIP-MCNC: NEGATIVE MG/DL
LACTATE SERPL-SCNC: 0.7 MMOL/L (ref 0.5–2)
LEUKOCYTE ESTERASE UR QL STRIP: NEGATIVE
LG PLATELETS BLD QL SMEAR: PRESENT
LYMPHOCYTES # BLD AUTO: 0.46 THOUSAND/UL (ref 0.6–4.47)
LYMPHOCYTES # BLD AUTO: 8 % (ref 14–44)
MAGNESIUM SERPL-MCNC: 1.8 MG/DL (ref 1.6–2.6)
MCH RBC QN AUTO: 30.3 PG (ref 26.8–34.3)
MCHC RBC AUTO-ENTMCNC: 32.7 G/DL (ref 31.4–37.4)
MCV RBC AUTO: 93 FL (ref 82–98)
MONOCYTES # BLD AUTO: 0.81 THOUSAND/UL (ref 0–1.22)
MONOCYTES NFR BLD: 14 % (ref 4–12)
NEUTROPHILS # BLD MANUAL: 4.18 THOUSAND/UL (ref 1.85–7.62)
NEUTS SEG NFR BLD AUTO: 72 % (ref 43–75)
NITRITE UR QL STRIP: NEGATIVE
PH UR STRIP.AUTO: 6 [PH] (ref 4.5–8)
PLATELET # BLD AUTO: 143 THOUSANDS/UL (ref 149–390)
PLATELET BLD QL SMEAR: ABNORMAL
PMV BLD AUTO: 11 FL (ref 8.9–12.7)
POTASSIUM SERPL-SCNC: 3.8 MMOL/L (ref 3.5–5.3)
PROCALCITONIN SERPL-MCNC: 0.1 NG/ML
PROT SERPL-MCNC: 6.7 G/DL (ref 6.4–8.2)
PROT UR STRIP-MCNC: NEGATIVE MG/DL
PROTHROMBIN TIME: 14.3 SECONDS (ref 11.6–14.5)
RBC # BLD AUTO: 4.45 MILLION/UL (ref 3.88–5.62)
RBC MORPH BLD: NORMAL
RSV RNA RESP QL NAA+PROBE: NEGATIVE
SARS-COV-2 RNA RESP QL NAA+PROBE: NEGATIVE
SODIUM SERPL-SCNC: 138 MMOL/L (ref 136–145)
SP GR UR STRIP.AUTO: 1.02 (ref 1–1.03)
UROBILINOGEN UR QL STRIP.AUTO: 0.2 E.U./DL
WBC # BLD AUTO: 5.81 THOUSAND/UL (ref 4.31–10.16)

## 2022-04-12 PROCEDURE — 80048 BASIC METABOLIC PNL TOTAL CA: CPT | Performed by: EMERGENCY MEDICINE

## 2022-04-12 PROCEDURE — 82948 REAGENT STRIP/BLOOD GLUCOSE: CPT

## 2022-04-12 PROCEDURE — 96360 HYDRATION IV INFUSION INIT: CPT

## 2022-04-12 PROCEDURE — 84145 PROCALCITONIN (PCT): CPT | Performed by: PHYSICIAN ASSISTANT

## 2022-04-12 PROCEDURE — 71045 X-RAY EXAM CHEST 1 VIEW: CPT

## 2022-04-12 PROCEDURE — 99223 1ST HOSP IP/OBS HIGH 75: CPT | Performed by: PHYSICIAN ASSISTANT

## 2022-04-12 PROCEDURE — 83605 ASSAY OF LACTIC ACID: CPT | Performed by: EMERGENCY MEDICINE

## 2022-04-12 PROCEDURE — 0241U HB NFCT DS VIR RESP RNA 4 TRGT: CPT | Performed by: EMERGENCY MEDICINE

## 2022-04-12 PROCEDURE — 36415 COLL VENOUS BLD VENIPUNCTURE: CPT | Performed by: EMERGENCY MEDICINE

## 2022-04-12 PROCEDURE — 71260 CT THORAX DX C+: CPT

## 2022-04-12 PROCEDURE — 81003 URINALYSIS AUTO W/O SCOPE: CPT

## 2022-04-12 PROCEDURE — 85610 PROTHROMBIN TIME: CPT | Performed by: EMERGENCY MEDICINE

## 2022-04-12 PROCEDURE — 85007 BL SMEAR W/DIFF WBC COUNT: CPT | Performed by: EMERGENCY MEDICINE

## 2022-04-12 PROCEDURE — 80076 HEPATIC FUNCTION PANEL: CPT | Performed by: EMERGENCY MEDICINE

## 2022-04-12 PROCEDURE — 87040 BLOOD CULTURE FOR BACTERIA: CPT | Performed by: EMERGENCY MEDICINE

## 2022-04-12 PROCEDURE — 74177 CT ABD & PELVIS W/CONTRAST: CPT

## 2022-04-12 PROCEDURE — G1004 CDSM NDSC: HCPCS

## 2022-04-12 PROCEDURE — 83735 ASSAY OF MAGNESIUM: CPT | Performed by: EMERGENCY MEDICINE

## 2022-04-12 PROCEDURE — 99285 EMERGENCY DEPT VISIT HI MDM: CPT

## 2022-04-12 PROCEDURE — 85025 COMPLETE CBC W/AUTO DIFF WBC: CPT | Performed by: EMERGENCY MEDICINE

## 2022-04-12 PROCEDURE — 85730 THROMBOPLASTIN TIME PARTIAL: CPT | Performed by: EMERGENCY MEDICINE

## 2022-04-12 PROCEDURE — 99285 EMERGENCY DEPT VISIT HI MDM: CPT | Performed by: EMERGENCY MEDICINE

## 2022-04-12 PROCEDURE — 96361 HYDRATE IV INFUSION ADD-ON: CPT

## 2022-04-12 PROCEDURE — 85027 COMPLETE CBC AUTOMATED: CPT | Performed by: EMERGENCY MEDICINE

## 2022-04-12 RX ORDER — ZOLPIDEM TARTRATE 5 MG/1
5 TABLET ORAL
Status: DISCONTINUED | OUTPATIENT
Start: 2022-04-12 | End: 2022-04-15

## 2022-04-12 RX ORDER — LORAZEPAM 1 MG/1
0.5 TABLET ORAL DAILY PRN
Status: DISCONTINUED | OUTPATIENT
Start: 2022-04-12 | End: 2022-04-15

## 2022-04-12 RX ORDER — ALBUTEROL SULFATE 2.5 MG/3ML
2.5 SOLUTION RESPIRATORY (INHALATION) EVERY 6 HOURS PRN
Status: DISCONTINUED | OUTPATIENT
Start: 2022-04-12 | End: 2022-04-15

## 2022-04-12 RX ORDER — CALCIUM CARBONATE 200(500)MG
1000 TABLET,CHEWABLE ORAL DAILY PRN
Status: DISCONTINUED | OUTPATIENT
Start: 2022-04-12 | End: 2022-04-15

## 2022-04-12 RX ORDER — ACETAMINOPHEN 325 MG/1
650 TABLET ORAL EVERY 6 HOURS PRN
Status: DISCONTINUED | OUTPATIENT
Start: 2022-04-12 | End: 2022-04-15

## 2022-04-12 RX ORDER — LANOLIN ALCOHOL/MO/W.PET/CERES
800 CREAM (GRAM) TOPICAL DAILY
Status: DISCONTINUED | OUTPATIENT
Start: 2022-04-13 | End: 2022-04-15 | Stop reason: HOSPADM

## 2022-04-12 RX ORDER — PREDNISONE 1 MG/1
5 TABLET ORAL DAILY
Status: DISCONTINUED | OUTPATIENT
Start: 2022-04-13 | End: 2022-04-15 | Stop reason: HOSPADM

## 2022-04-12 RX ORDER — ONDANSETRON 2 MG/ML
4 INJECTION INTRAMUSCULAR; INTRAVENOUS EVERY 6 HOURS PRN
Status: DISCONTINUED | OUTPATIENT
Start: 2022-04-12 | End: 2022-04-15

## 2022-04-12 RX ORDER — BENZONATATE 100 MG/1
200 CAPSULE ORAL 3 TIMES DAILY PRN
Status: DISCONTINUED | OUTPATIENT
Start: 2022-04-12 | End: 2022-04-15

## 2022-04-12 RX ORDER — GABAPENTIN 300 MG/1
600 CAPSULE ORAL DAILY
Status: DISCONTINUED | OUTPATIENT
Start: 2022-04-13 | End: 2022-04-15 | Stop reason: HOSPADM

## 2022-04-12 RX ORDER — ASPIRIN 81 MG/1
81 TABLET ORAL DAILY
Status: DISCONTINUED | OUTPATIENT
Start: 2022-04-13 | End: 2022-04-15 | Stop reason: HOSPADM

## 2022-04-12 RX ORDER — CITALOPRAM 20 MG/1
40 TABLET ORAL DAILY
Status: DISCONTINUED | OUTPATIENT
Start: 2022-04-13 | End: 2022-04-15 | Stop reason: HOSPADM

## 2022-04-12 RX ORDER — GUAIFENESIN 600 MG
600 TABLET, EXTENDED RELEASE 12 HR ORAL EVERY 12 HOURS SCHEDULED
Status: DISCONTINUED | OUTPATIENT
Start: 2022-04-12 | End: 2022-04-15 | Stop reason: HOSPADM

## 2022-04-12 RX ORDER — FONDAPARINUX SODIUM 2.5 MG/.5ML
2.5 INJECTION SUBCUTANEOUS DAILY
Status: DISCONTINUED | OUTPATIENT
Start: 2022-04-13 | End: 2022-04-15

## 2022-04-12 RX ORDER — ACETAMINOPHEN 325 MG/1
650 TABLET ORAL ONCE
Status: COMPLETED | OUTPATIENT
Start: 2022-04-12 | End: 2022-04-12

## 2022-04-12 RX ORDER — INSULIN GLARGINE 100 [IU]/ML
17 INJECTION, SOLUTION SUBCUTANEOUS
Status: DISCONTINUED | OUTPATIENT
Start: 2022-04-12 | End: 2022-04-14

## 2022-04-12 RX ORDER — ALBUTEROL SULFATE 90 UG/1
1 AEROSOL, METERED RESPIRATORY (INHALATION)
Status: DISCONTINUED | OUTPATIENT
Start: 2022-04-12 | End: 2022-04-14

## 2022-04-12 RX ORDER — POLYETHYLENE GLYCOL 3350 17 G/17G
17 POWDER, FOR SOLUTION ORAL DAILY PRN
Status: DISCONTINUED | OUTPATIENT
Start: 2022-04-12 | End: 2022-04-15

## 2022-04-12 RX ORDER — LOSARTAN POTASSIUM 50 MG/1
100 TABLET ORAL DAILY
Status: DISCONTINUED | OUTPATIENT
Start: 2022-04-13 | End: 2022-04-15 | Stop reason: HOSPADM

## 2022-04-12 RX ORDER — AMLODIPINE BESYLATE 10 MG/1
10 TABLET ORAL DAILY
Status: DISCONTINUED | OUTPATIENT
Start: 2022-04-13 | End: 2022-04-15 | Stop reason: HOSPADM

## 2022-04-12 RX ORDER — FENOFIBRATE 145 MG/1
145 TABLET, COATED ORAL DAILY
Status: DISCONTINUED | OUTPATIENT
Start: 2022-04-13 | End: 2022-04-15 | Stop reason: HOSPADM

## 2022-04-12 RX ORDER — PANTOPRAZOLE SODIUM 40 MG/1
40 TABLET, DELAYED RELEASE ORAL
Status: DISCONTINUED | OUTPATIENT
Start: 2022-04-13 | End: 2022-04-15 | Stop reason: HOSPADM

## 2022-04-12 RX ORDER — OXYCODONE HYDROCHLORIDE 10 MG/1
10 TABLET ORAL EVERY 6 HOURS PRN
Status: DISCONTINUED | OUTPATIENT
Start: 2022-04-12 | End: 2022-04-15

## 2022-04-12 RX ORDER — FLUTICASONE FUROATE AND VILANTEROL 200; 25 UG/1; UG/1
1 POWDER RESPIRATORY (INHALATION)
Status: DISCONTINUED | OUTPATIENT
Start: 2022-04-13 | End: 2022-04-15 | Stop reason: HOSPADM

## 2022-04-12 RX ADMIN — SODIUM CHLORIDE 1000 ML: 0.9 INJECTION, SOLUTION INTRAVENOUS at 15:11

## 2022-04-12 RX ADMIN — ACETAMINOPHEN 325MG 650 MG: 325 TABLET ORAL at 16:51

## 2022-04-12 RX ADMIN — GUAIFENESIN 600 MG: 600 TABLET, EXTENDED RELEASE ORAL at 21:21

## 2022-04-12 RX ADMIN — INSULIN GLARGINE 17 UNITS: 100 INJECTION, SOLUTION SUBCUTANEOUS at 21:21

## 2022-04-12 RX ADMIN — IOHEXOL 100 ML: 350 INJECTION, SOLUTION INTRAVENOUS at 17:27

## 2022-04-12 RX ADMIN — INSULIN LISPRO 1 UNITS: 100 INJECTION, SOLUTION INTRAVENOUS; SUBCUTANEOUS at 23:03

## 2022-04-12 RX ADMIN — CEFTRIAXONE SODIUM 1000 MG: 10 INJECTION, POWDER, FOR SOLUTION INTRAVENOUS at 19:11

## 2022-04-12 RX ADMIN — ALBUTEROL SULFATE 1 PUFF: 90 AEROSOL, METERED RESPIRATORY (INHALATION) at 21:18

## 2022-04-12 NOTE — ED PROVIDER NOTES
History  Chief Complaint   Patient presents with    Fever - 9 weeks to 74 years     pt reports that 103 fever with vomiting that started on Sunday; patient is a chemo patient; patient was told to come to the ER; tylenol was given 11 am      79 YO male with Hx of CLL presents with fevers, nausea, vomiting, LUQ pain, fatigue and cough  States this has been present for the last 2 days  He has been taking acetaminophen, which has helped, but his symptoms then return  Pt states he does take oral chemotherapeutic agent and has also been receiving immunoglobulin infusions every other week  Pt has been receiving treatments for the last 20 years, states he has never had a negative response to treatments in the past  States his last infusion was 7 days ago  Pt spoke with his oncologist and was instructed to come to the ED for evaluation  Pt denies CP/SOB/F/C/D/C, no dysuria, burning on urination or blood in urine  History provided by:  Patient and spouse   used: No    Fever - 9 weeks to 74 years  Max temp prior to arrival:  103  Temp source:  Oral  Severity:  Moderate  Onset quality:  Gradual  Duration:  2 days  Timing:  Intermittent  Progression:  Waxing and waning  Chronicity:  New  Relieved by:  Acetaminophen  Worsened by:  Nothing  Associated symptoms: congestion, cough, myalgias, nausea and vomiting    Associated symptoms: no chest pain, no chills, no confusion, no dysuria and no rash        Prior to Admission Medications   Prescriptions Last Dose Informant Patient Reported? Taking?    Ibrutinib 140 MG TABS  Self No No   Sig: Take 140 mg by mouth daily   Patient not taking: Reported on 3/9/2022    Ibrutinib 140 MG TABS  Self No No   Sig: Take 140 mg by mouth daily   Insulin Pen Needle (BD Pen Needle Inez U/F) 32G X 4 MM MISC  Self No No   Sig: Use 3 (three) times a day   Insulin Syringe-Needle U-100 30G X 1/2" 0 3 ML MISC  Self No No   Sig: by Does not apply route 2 (two) times a day   LORazepam (ATIVAN) 0 5 mg tablet  Self No Yes   Sig: Take 1 tablet (0 5 mg total) by mouth daily as needed for anxiety   Lancets (FREESTYLE) lancets  Self No No   Sig: Use as instructed--test 2 times a day    E 11 9   Potassium (POTASSIMIN PO) Unknown at Unknown time Self Yes No   Sig: Take 20 mEq by mouth daily     Ventolin  (90 Base) MCG/ACT inhaler  Self No Yes   Sig: Inhale 1 puff 4 (four) times a day   acyclovir (ZOVIRAX) 400 MG tablet  Self No No   Sig: TAKE 1 TABLET TWICE A DAY   albuterol (2 5 mg/3 mL) 0 083 % nebulizer solution  Self No Yes   Sig: Take 3 mL (2 5 mg total) by nebulization every 6 (six) hours as needed for wheezing or shortness of breath   amLODIPine (NORVASC) 10 mg tablet   No Yes   Sig: TAKE 1 TABLET DAILY   aspirin 81 MG tablet  Self Yes Yes   Sig: Take 81 mg by mouth every other day Every other day    benzonatate (TESSALON) 200 MG capsule  Self No No   Sig: Take 1 capsule (200 mg total) by mouth 3 (three) times a day as needed for cough   citalopram (CeleXA) 40 mg tablet  Self No Yes   Sig: TAKE 1 TABLET DAILY   fenofibrate (TRICOR) 145 mg tablet  Self No Yes   Sig: TAKE 1 TABLET DAILY   folic acid (FOLVITE) 1 mg tablet  Self Yes Yes   Sig: Take 800 mcg by mouth daily    furosemide (LASIX) 40 mg tablet Unknown at Unknown time Self No No   Sig: TAKE 1 TABLET DAILY   Patient not taking: Reported on 2022   gabapentin (NEURONTIN) 600 MG tablet  Self No Yes   Sig: Take 1 tablet (600 mg total) by mouth daily   glucose monitoring kit (FREESTYLE) monitoring kit  Self No No   Si each by Does not apply route as needed ( ) E 11 9   guaifenesin-codeine (GUAIFENESIN AC) 100-10 MG/5ML liquid  Self No No   Sig: Take 5 mL by mouth 3 (three) times a day as needed for cough   insulin degludec [place] FlexTouch) 100 units/mL injection pen   No Yes   Sig: Inject 15 Units under the skin daily at bedtime   Patient taking differently: Inject 17 Units under the skin daily at bedtime     insulin lispro (HumaLOG) 100 units/mL injection pen  Self No Yes   Sig: Use 5-10 units before meals as needed for steroid induced hyperglycemia   Patient taking differently: Use 5-17 units before meals as needed for steroid induced hyperglycemia    losartan (COZAAR) 100 MG tablet  Self No Yes   Sig: TAKE 1 TABLET DAILY   mometasone-formoterol (Dulera) 200-5 MCG/ACT inhaler  Self No Yes   Sig: Inhale 2 puffs 2 (two) times a day Rinse mouth after use    multivitamin (THERAGRAN) TABS  Self Yes Yes   Sig: Take 1 tablet by mouth daily   naloxone (NARCAN) 4 mg/0 1 mL nasal spray  Self No No   Sig: Administer 1 spray into a nostril  If no response after 2-3 minutes, give another dose in the other nostril using a new spray     nystatin (MYCOSTATIN) 500,000 units/5 mL suspension  Self No No   Sig: Apply 2 mL (200,000 Units total) to the mouth or throat 4 (four) times a day   obinutuzumab (GAZYVA) 1000 mg/40 mL SOLN  Self Yes Yes   Sig: Infuse into a venous catheter   oxyCODONE (ROXICODONE) 10 MG TABS  Self No Yes   Sig: Take 1 tablet (10 mg total) by mouth every 6 (six) hours as needed for moderate pain For ongoing pain controlMax Daily Amount: 40 mg   pantoprazole (PROTONIX) 40 mg tablet More than a month at Unknown time Self No No   Sig: TAKE 1 TABLET DAILY   predniSONE 20 mg tablet Not Taking at Unknown time Self No No   Si mg one pill a day for 3 days then 10 mg po a day for 3 days then 5 mg po daily for 3 days   Patient not taking: Reported on 2022    predniSONE 5 mg tablet  Self No Yes   Sig: TAKE 1 TABLET DAILY   sodium chloride, PF, 0 9 %  Self No No   Sig: 10 mL by Intracatheter route see administration instructions 10mL into port before and after ampicillin doses   zolpidem (AMBIEN) 5 mg tablet  Self No Yes   Sig: Take 1 tablet (5 mg total) by mouth daily at bedtime as needed for sleep      Facility-Administered Medications: None       Past Medical History:   Diagnosis Date    Allergic     Anemia     Arthritis     CAD (coronary artery disease) 2004    Cancer [facility])     Leukemia    Cellulitis of scalp     CKD (chronic kidney disease) stage 3, GFR 30-59 ml/min (HCC)     CLL (chronic lymphocytic leukemia) (HCC)     [facility]     [facility]     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    Dyslipidemia     Edema extremities     Esophageal ulcer     H/O blood clots     Hemolytic anemia (HCC)     Hiatal hernia     History of TIA (transient ischemic attack)     Hyperlipidemia     Hypertension     Listeria infection 2018    Multiple gastric ulcers     Myocardial infarction ([facility] ) 2004    acute    Neutropenia (HCC)     Obese     Recurrent sinusitis     Sleep apnea     Thrombocytopenia (HCC)     Vertigo        Past Surgical History:   Procedure Laterality Date    ARTHROSCOPIC REPAIR ACL      lt knee    CARDIAC SURGERY      cardiac cath with stent    FOOT SURGERY      IR PORT CHECK  5/17/2019    KNEE ARTHROSCOPY      OTHER SURGICAL HISTORY      cardiac cath lesion 1, 1st adjunct treat device: stent    PORTACATH PLACEMENT      [place] ESOPHAGOGASTRODUODENOSCOPY TRANSORAL DIAGNOSTIC N/A 10/5/2017    Procedure: ESOPHAGOGASTRODUODENOSCOPY (EGD) with bx;  Surgeon: Stephon Venegas DO;  [facility]: [facility]; Service: Gastroenterology    [place] ESOPHAGOGASTRODUODENOSCOPY TRANSORAL DIAGNOSTIC N/A 3/8/2018    Procedure: ESOPHAGOGASTRODUODENOSCOPY (EGD) with biopsy;  Surgeon: Stephon Venegas DO;  [facility]; Service: Gastroenterology    [place] ESOPHAGOGASTRODUODENOSCOPY TRANSORAL DIAGNOSTIC N/A 6/20/2018    Procedure: ESOPHAGOGASTRODUODENOSCOPY (EGD) with Dilation;  Surgeon: Stephon Venegas DO;  [facility]; Service: Gastroenterology    [place] ESOPHAGOGASTRODUODENOSCOPY TRANSORAL DIAGNOSTIC N/A 10/18/2018    Procedure: ESOPHAGOGASTRODUODENOSCOPY (EGD) with dilation;  Surgeon: Yolande Cortez MD;  [facility]: [facility];   Service: Gastroenterology    [place] ESOPHAGOSCOPY FLEXIBLE TRANSORAL WITH BIOPSY N/A 2016    Procedure: ESOPHAGOGASTRODUODENOSCOPY (EGD); ESOPHAGEAL DILATION; ESOPHAGEAL BIOPSY;  Surgeon: Nandini Brody MD;  [facility];  Service: Thoracic    TONSILLECTOMY      UPPER GASTROINTESTINAL ENDOSCOPY      x 6       Family History   Problem Relation Age of Onset    Leukemia Mother         chronic    Colon polyps Mother         sidmoid    Parkinsonism Father      I have reviewed and agree with the history as documented  E-Cigarette/Vaping    E-Cigarette Use Never User      E-Cigarette/Vaping Substances    Nicotine No     THC No     CBD No     Flavoring No     Other No     Unknown No      Social History     Tobacco Use    Smoking status: Former Smoker     Packs/day: 2 00     Years: 33 00     Pack years: 66 00     Types: Cigarettes     Start date:      Quit date:      Years since quittin 2    Smokeless tobacco: Never Used   Vaping Use    Vaping Use: Never used   Substance Use Topics    Alcohol use: Yes     Alcohol/week: 2 0 standard drinks     Types: 2 Cans of beer per week     Comment: social use as per [employer]    Drug use: No       Review of Systems   Constitutional: Positive for fatigue and fever  Negative for chills  HENT: Positive for congestion  Negative for dental problem  Eyes: Negative for visual disturbance  Respiratory: Positive for cough  Negative for shortness of breath  Cardiovascular: Negative for chest pain  Gastrointestinal: Positive for nausea and vomiting  Negative for abdominal pain  Genitourinary: Negative for dysuria and frequency  Musculoskeletal: Positive for myalgias  Negative for neck pain and neck stiffness  Skin: Negative for rash  Neurological: Negative for dizziness, weakness and light-headedness  Psychiatric/Behavioral: Negative for agitation, behavioral problems and confusion  All other systems reviewed and are negative        Physical Exam  Physical Exam  Vitals and nursing note reviewed  Constitutional:       Appearance: He is well-developed  HENT:      Head: Normocephalic and atraumatic  Eyes:      Extraocular Movements: Extraocular movements intact  Pupils: Pupils are equal, round, and reactive to light  Cardiovascular:      Rate and Rhythm: Normal rate  Pulmonary:      Effort: Pulmonary effort is normal    Abdominal:      General: There is no distension  Tenderness: There is abdominal tenderness in the left upper quadrant  There is no guarding or rebound  Musculoskeletal:         General: Normal range of motion  Cervical back: Normal range of motion  Skin:     Findings: No rash  Neurological:      Mental Status: He is alert and oriented to person, place, and time     Psychiatric:         Behavior: Behavior normal          Vital Signs  ED Triage Vitals   Temperature Pulse Respirations Blood Pressure SpO2   04/12/22 1353 04/12/22 1353 04/12/22 1353 04/12/22 1353 04/12/22 1355   99 4 °F (37 4 °C) 85 20 149/74 95 %      Temp Source Heart Rate Source Patient Position - Orthostatic VS BP Location FiO2 (%)   04/12/22 1611 04/12/22 1611 04/12/22 1611 04/12/22 1611 --   Oral Monitor Sitting Right arm       Pain Score       04/12/22 1611       2           Vitals:    04/12/22 1611 04/12/22 1829 04/12/22 1945 04/12/22 2042   BP: 164/74 138/76 159/74 118/71   Pulse: 83 77 93 81   Patient Position - Orthostatic VS: Sitting Sitting Lying          Visual Acuity      ED Medications  Medications   albuterol inhalation solution 2 5 mg (has no administration in time range)   amLODIPine (NORVASC) tablet 10 mg (has no administration in time range)   aspirin (ECOTRIN LOW STRENGTH) EC tablet 81 mg (has no administration in time range)   benzonatate (TESSALON PERLES) capsule 200 mg (has no administration in time range)   citalopram (CeleXA) tablet 40 mg (has no administration in time range)   fenofibrate (TRICOR) tablet 145 mg (has no administration in time range)   folic acid (FOLVITE) tablet 800 mcg (has no administration in time range)   gabapentin (NEURONTIN) capsule 600 mg (has no administration in time range)   insulin glargine (LANTUS) subcutaneous injection 17 Units 0 17 mL (17 Units Subcutaneous Given 4/12/22 2121)   LORazepam (ATIVAN) tablet 0 5 mg (has no administration in time range)   losartan (COZAAR) tablet 100 mg (has no administration in time range)   fluticasone-vilanterol (BREO ELLIPTA) 200-25 MCG/INH inhaler 1 puff (has no administration in time range)   multivitamin stress formula tablet 1 tablet (has no administration in time range)   oxyCODONE (ROXICODONE) immediate release tablet 10 mg (has no administration in time range)   pantoprazole (PROTONIX) EC tablet 40 mg (has no administration in time range)   predniSONE tablet 5 mg (has no administration in time range)   albuterol (PROVENTIL HFA,VENTOLIN HFA) inhaler 1 puff (1 puff Inhalation Given 4/12/22 2118)   zolpidem (AMBIEN) tablet 5 mg (has no administration in time range)   acetaminophen (TYLENOL) tablet 650 mg (has no administration in time range)   ondansetron (ZOFRAN) injection 4 mg (has no administration in time range)   calcium carbonate (TUMS) chewable tablet 1,000 mg (has no administration in time range)   polyethylene glycol (MIRALAX) packet 17 g (has no administration in time range)   guaiFENesin (MUCINEX) 12 hr tablet 600 mg (600 mg Oral Given 4/12/22 2121)   fondaparinux (ARIXTRA) subcutaneous injection 2 5 mg (has no administration in time range)   insulin lispro (HumaLOG) 100 units/mL subcutaneous injection 2-12 Units (has no administration in time range)   insulin lispro (HumaLOG) 100 units/mL subcutaneous injection 1-6 Units (has no administration in time range)   cefTRIAXone (ROCEPHIN) 1,000 mg in dextrose 5 % 50 mL IVPB (has no administration in time range)   sodium chloride 0 9 % bolus 1,000 mL (0 mL Intravenous Stopped 4/12/22 1645)   acetaminophen (TYLENOL) tablet 650 mg (650 mg Oral Given 4/12/22 1651)   iohexol (OMNIPAQUE) 350 MG/ML injection (SINGLE-DOSE) 100 mL (100 mL Intravenous Given 4/12/22 1727)   ceftriaxone (ROCEPHIN) 1 g/50 mL in dextrose IVPB (1,000 mg Intravenous New Bag 4/12/22 1911)       Diagnostic Studies  Results Reviewed     Procedure Component Value Units Date/Time    Urine Macroscopic, POC [[id number]]  (Abnormal) Collected: 04/12/22 1646    Lab Status: Final result Specimen: Urine Updated: 04/12/22 1648     Color, UA Yellow     Clarity, UA Clear     pH, UA 6 0     Leukocytes, UA Negative     Nitrite, UA Negative     Protein, UA Negative mg/dl      Glucose,  (1/4%) mg/dl      Ketones, UA Negative mg/dl      Urobilinogen, UA 0 2 E U /dl      Bilirubin, UA Negative     Blood, UA Negative     Specific Gravity, UA 1 025    Narrative:      CLINITEK RESULT    COVID/FLU/RSV - 2 hour TAT [[id number]]  (Normal) Collected: 04/12/22 1458    Lab Status: Final result Specimen: Nares from Nasopharyngeal Swab Updated: 04/12/22 1554     SARS-CoV-2 Negative     INFLUENZA A PCR Negative     INFLUENZA B PCR Negative     RSV PCR Negative    Narrative:      FOR PEDIATRIC PATIENTS - copy/paste COVID Guidelines URL to browser: [email or web address]  [email or web address]    SARS-CoV-2 assay is a Nucleic Acid Amplification assay intended for the  qualitative detection of nucleic acid from SARS-CoV-2 in nasopharyngeal  swabs  Results are for the presumptive identification of SARS-CoV-2 RNA  Positive results are indicative of infection with SARS-CoV-2, the virus  causing COVID-19, but do not rule out bacterial infection or co-infection  with other viruses  Laboratories within the [place] and its  territories are required to report all positive results to the appropriate  public health authorities  Negative results do not preclude SARS-CoV-2  infection and should not be used as the sole basis for treatment or other  patient management decisions   Negative results must be combined with  clinical observations, patient history, and epidemiological information  This test has not been FDA cleared or approved  This test has been authorized by FDA under an Emergency Use Authorization  (EUA)  This test is only authorized for the duration of time the  declaration that circumstances exist justifying the authorization of the  emergency use of an in vitro diagnostic tests for detection of SARS-CoV-2  virus and/or diagnosis of COVID-19 infection under section 564(b)(1) of  the Act, 21 U  S C  286KTL-6(V)(3), unless the authorization is terminated  or revoked sooner  The test has been validated but independent review by FDA  and CLIA is pending  Test performed using [employer] GeneXpert: This RT-PCR assay targets N2,  a region unique to SARS-CoV-2  A conserved region in the E-gene was chosen  for pan-Sarbecovirus detection which includes SARS-CoV-2      Hepatic function panel [[id number]]  (Abnormal) Collected: 04/12/22 1450    Lab Status: Final result Specimen: Blood from Line, Venous Updated: 04/12/22 1537     Total Bilirubin 0 41 mg/dL      Bilirubin, Direct 0 14 mg/dL      Alkaline Phosphatase 54 U/L      AST 20 U/L      ALT 29 U/L      Total Protein 6 7 g/dL      Albumin 3 2 g/dL     Basic metabolic panel [[id number]]  (Abnormal) Collected: 04/12/22 1450    Lab Status: Final result Specimen: Blood from Line, Venous Updated: 04/12/22 1537     Sodium 138 mmol/L      Potassium 3 8 mmol/L      Chloride 105 mmol/L      CO2 26 mmol/L      ANION GAP 7 mmol/L      BUN 20 mg/dL      Creatinine 1 27 mg/dL      Glucose 244 mg/dL      Calcium 8 6 mg/dL      eGFR 58 ml/min/1 73sq m     Narrative:      [place] guidelines for Chronic Kidney Disease (CKD):     Stage 1 with normal or high GFR (GFR > 90 mL/min/1 73 square meters)    Stage 2 Mild CKD (GFR = 60-89 mL/min/1 73 square meters)    Stage 3A Moderate CKD (GFR = 45-59 mL/min/1 73 square meters)    Stage 3B Moderate CKD (GFR = 30-44 mL/min/1 73 square meters)    Stage 4 Severe CKD (GFR = 15-29 mL/min/1 73 square meters)    Stage 5 End Stage CKD (GFR <15 mL/min/1 73 square meters)  Note: GFR calculation is accurate only with a steady state creatinine    Magnesium [[id number]]  (Normal) Collected: 04/12/22 1450    Lab Status: Final result Specimen: Blood from Line, Venous Updated: 04/12/22 1536     Magnesium 1 8 mg/dL     Manual Differential(PHLEBS Do Not Order) [[id number]]  (Abnormal) Collected: 04/12/22 1450    Lab Status: Final result Specimen: Blood from Line, Venous Updated: 04/12/22 1534     Segmented % 72 %      Lymphocytes % 8 %      Monocytes % 14 %      Eosinophils, % 6 %      Basophils % 0 %      Absolute Neutrophils 4 18 Thousand/uL      Lymphocytes Absolute 0 46 Thousand/uL      Monocytes Absolute 0 81 Thousand/uL      Eosinophils Absolute 0 35 Thousand/uL      Basophils Absolute 0 00 Thousand/uL      Total Counted --     RBC Morphology Normal     Platelet Estimate Borderline     Large Platelet Present    Lactic acid [[id number]]  (Normal) Collected: 04/12/22 1450    Lab Status: Final result Specimen: Blood from Line, Venous Updated: 04/12/22 1527     LACTIC ACID 0 7 mmol/L     Narrative:      Result may be elevated if tourniquet was used during collection      Isadora Mckeon [[id number]]  (Normal) Collected: 04/12/22 1450    Lab Status: Final result Specimen: Blood from Line, Venous Updated: 04/12/22 1514     Protime 14 3 seconds      INR 1 14    APTT [[id number]]  (Normal) Collected: 04/12/22 1450    Lab Status: Final result Specimen: Blood from Line, Venous Updated: 04/12/22 1514     PTT 29 seconds     CBC and differential [[id number]]  (Abnormal) Collected: 04/12/22 1450    Lab Status: Final result Specimen: Blood from Line, Venous Updated: 04/12/22 1506     WBC 5 81 Thousand/uL      RBC 4 45 Million/uL      Hemoglobin 13 5 g/dL      Hematocrit 41 3 %      MCV 93 fL      MCH 30 3 pg      MCHC 32 7 g/dL      RDW 13 8 %      MPV 11 0 fL      Platelets 214 Thousands/uL     Narrative: This is an appended report  These results have been appended to a previously verified report  Blood culture #1 [[id number]] Collected: 04/12/22 1455    Lab Status: In process Specimen: Blood from Arm, Left Updated: 04/12/22 1502    Blood culture #2 [[id number]] Collected: 04/12/22 1450    Lab Status: In process Specimen: Blood from Line, Venous Updated: 04/12/22 1455                 CT chest abdomen pelvis w contrast   Final Result by Pierre Espana DO (04/12 1822)      1  Findings suggesting multifocal pneumonia  Follow-up advised to ensure resolution  2  No acute intra-abdominal or pelvic pathology  3  Mild distal esophageal thickening, similar in retrospect, possibly indicative of esophagitis  This can be confirmed with esophagram or endoscopy if clinically relevant  4  Fatty liver  5  Colonic diverticulosis without diverticulitis  Workstation performed: [id number]         XR chest 1 view portable    (Results Pending)              Procedures  Procedures         ED Course                               SBIRT 20yo+      Most Recent Value   SBIRT (22 yo +)    In order to provide better care to our patients, we are screening all of our patients for alcohol and drug use  Would it be okay to ask you these screening questions? No Filed at: 04/12/2022 1734                    MDM  Number of Diagnoses or Management Options  Bilateral pneumonia: new and requires workup  Fever: new and requires workup  Diagnosis management comments: 1  Fever - Pt with fever, fatigue, myalgias, coughing, nausea  Will check CBC for leukocytosis as well as anemia as Pt has required transfusions in the past, metabolic panel for electrolyte abnormalities and dehydration, CXR for PNA, urine for infection, COVID/Flu, CT abdomen as he has pain at the area of the spleen  Will give fluids          Amount and/or Complexity of Data Reviewed  Clinical lab tests: ordered and reviewed  Tests in the radiology section of CPT®: ordered and reviewed  Obtain history from someone other than the patient: yes  Review and summarize past medical records: yes  Discuss the patient with other providers: yes  Independent visualization of images, tracings, or specimens: yes    Patient Progress  Patient progress: stable      Disposition  Final diagnoses:   Fever   Bilateral pneumonia     Time reflects when diagnosis was documented in both MDM as applicable and the Disposition within this note     Time User Action Codes Description Comment    4/12/2022  7:01 PM Carlos Manuel CANCHOLA Add [R50 9] Fever     4/12/2022  7:01 PM Carlos Manuel CANCHOLA Add [J18 9] Bilateral pneumonia       ED Disposition     ED Disposition Condition Date/Time Comment    Admit Stable Tue Apr 12, 2022  7:01 PM Case was discussed with MARIELA and the patient's admission status was agreed to be Admission Status: inpatient status to the service of Dr To Lott           Follow-up Information    None         Current Discharge Medication List      CONTINUE these medications which have NOT CHANGED    Details   albuterol (2 5 mg/3 mL) 0 083 % nebulizer solution Take 3 mL (2 5 mg total) by nebulization every 6 (six) hours as needed for wheezing or shortness of breath  Qty: 180 mL, Refills: 5    Associated Diagnoses: Chronic obstructive pulmonary disease, unspecified COPD type (HCC)      amLODIPine (NORVASC) 10 mg tablet TAKE 1 TABLET DAILY  Qty: 90 tablet, Refills: 5    Associated Diagnoses: Essential hypertension      aspirin 81 MG tablet Take 81 mg by mouth every other day Every other day       citalopram (CeleXA) 40 mg tablet TAKE 1 TABLET DAILY  Qty: 90 tablet, Refills: 5    Associated Diagnoses: Anxiety      fenofibrate (TRICOR) 145 mg tablet TAKE 1 TABLET DAILY  Qty: 90 tablet, Refills: 5    Associated Diagnoses: Mixed hyperlipidemia      folic acid (FOLVITE) 1 mg tablet Take 800 mcg by mouth daily gabapentin (NEURONTIN) 600 MG tablet Take 1 tablet (600 mg total) by mouth daily  Qty: 90 tablet, Refills: 6    Associated Diagnoses: Neuropathy      insulin degludec [place] Mag[place]ar FlexTouch) 100 units/mL injection pen Inject 15 Units under the skin daily at bedtime  Qty: 15 mL, Refills: 1    Associated Diagnoses: Type 2 diabetes mellitus without complication, with long-term current use of insulin ([facility])      insulin lispro (HumaLOG) 100 units/mL injection pen Use 5-10 units before meals as needed for steroid induced hyperglycemia  Qty: 15 mL, Refills: 0    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[facility])      LORazepam (ATIVAN) 0 5 mg tablet Take 1 tablet (0 5 mg total) by mouth daily as needed for anxiety  Qty: 30 tablet, Refills: 0    Associated Diagnoses: Anxiety disorder, unspecified type      losartan (COZAAR) 100 MG tablet TAKE 1 TABLET DAILY  Qty: 90 tablet, Refills: 5    Associated Diagnoses: Essential hypertension      mometasone-formoterol (Dulera) 200-5 MCG/ACT inhaler Inhale 2 puffs 2 (two) times a day Rinse mouth after use  Qty: 13 g, Refills: 1    Associated Diagnoses: Chronic obstructive pulmonary disease, unspecified COPD type ([facility] )      multivitamin (THERAGRAN) TABS Take 1 tablet by mouth daily      obinutuzumab (GAZYVA) 1000 mg/40 mL SOLN Infuse into a venous catheter      oxyCODONE (ROXICODONE) 10 MG TABS Take 1 tablet (10 mg total) by mouth every 6 (six) hours as needed for moderate pain For ongoing pain controlMax Daily Amount: 40 mg  Qty: 90 tablet, Refills: 0    Associated Diagnoses: Cancer related pain      ! ! predniSONE 5 mg tablet TAKE 1 TABLET DAILY  Qty: 90 tablet, Refills: 5    Associated Diagnoses: CLL (chronic lymphocytic leukemia) ([facility])      Ventolin  (90 Base) MCG/ACT inhaler Inhale 1 puff 4 (four) times a day  Qty: 36 g, Refills: 5    Comments: Substitution to a formulary equivalent within the same pharmaceutical class is authorized  Associated Diagnoses:  Moderate asthma, unspecified whether complicated, unspecified whether persistent      zolpidem (AMBIEN) 5 mg tablet Take 1 tablet (5 mg total) by mouth daily at bedtime as needed for sleep  Qty: 30 tablet, Refills: 0    Associated Diagnoses: Insomnia, unspecified type      acyclovir (ZOVIRAX) 400 MG tablet TAKE 1 TABLET TWICE A DAY  Qty: 60 tablet, Refills: 11    Associated Diagnoses: [facility] health care      benzonatate (TESSALON) 200 MG capsule Take 1 capsule (200 mg total) by mouth 3 (three) times a day as needed for cough  Qty: 20 capsule, Refills: 0    Associated Diagnoses: Acute bronchitis, unspecified organism; Chronic obstructive pulmonary disease, unspecified COPD type (HCC)      furosemide (LASIX) 40 mg tablet TAKE 1 TABLET DAILY  Qty: 90 tablet, Refills: 5    Associated Diagnoses: Essential hypertension      glucose monitoring kit (FREESTYLE) monitoring kit 1 each by Does not apply route as needed ( ) E 11 9  Qty: 1 each, Refills: 0      guaifenesin-codeine (GUAIFENESIN AC) 100-10 MG/5ML liquid Take 5 mL by mouth 3 (three) times a day as needed for cough  Qty: 236 mL, Refills: 0    Associated Diagnoses: Acute bronchitis, unspecified organism; Chronic obstructive pulmonary disease, unspecified COPD type (HCC)      Ibrutinib 140 MG TABS Take 140 mg by mouth daily  Qty: 30 tablet, Refills: 11    Associated Diagnoses: Chronic lymphatic leukemia (HCC)      Ibrutinib 140 MG TABS Take 140 mg by mouth daily  Qty: 30 tablet, Refills: 6    Associated Diagnoses: Chronic lymphatic leukemia (HCC)      Insulin Pen Needle (BD Pen Needle Inez U/F) 32G X 4 MM MISC Use 3 (three) times a day  Qty: 300 each, Refills: 1    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HCC)      Insulin Syringe-Needle U-100 30G X 1/2" 0 3 ML MISC by Does not apply route 2 (two) times a day  Qty: 100 each, Refills: 6    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HCC)      Lancets (FREESTYLE) lancets Use as instructed--test 2 times a day    E 11 9  Qty: 100 each, Refills: 0      naloxone (NARCAN) 4 mg/0 1 mL nasal spray Administer 1 spray into a nostril  If no response after 2-3 minutes, give another dose in the other nostril using a new spray  Qty: 1 each, Refills: 1    Associated Diagnoses: Long term use of drug      nystatin (MYCOSTATIN) 500,000 units/5 mL suspension Apply 2 mL (200,000 Units total) to the mouth or throat 4 (four) times a day  Qty: 473 mL, Refills: 0    Associated Diagnoses: Thrush      pantoprazole (PROTONIX) 40 mg tablet TAKE 1 TABLET DAILY  Qty: 90 tablet, Refills: 5    Associated Diagnoses: Esophageal dysphagia; Gastroesophageal reflux disease without esophagitis      Potassium (POTASSIMIN PO) Take 20 mEq by mouth daily        ! ! predniSONE 20 mg tablet 20 mg one pill a day for 3 days then 10 mg po a day for 3 days then 5 mg po daily for 3 days  Qty: 20 tablet, Refills: 0    Associated Diagnoses: Chronic obstructive pulmonary disease, unspecified COPD type (HCC)      sodium chloride, PF, 0 9 % 10 mL by Intracatheter route see administration instructions 10mL into port before and after ampicillin doses  Refills: 0    Associated Diagnoses: [facility]ch[facility]ocytic [facility]) ([facility]       ! ! - Potential duplicate medications found  Please discuss with provider  No discharge procedures on file      PDMP Review       Value Time User    PDMP Reviewed  Yes 2/28/2022  2:12 PM Arturo Ha MD          ED Provider  Electronically Signed by           Olu Mcnally MD  04/12/22 3743

## 2022-04-12 NOTE — TELEPHONE ENCOUNTER
Returned telephone call spoke with pt  He is reporting same systems as he had on 2 days ago  Started about 2 hours ago with temp of 101 7 and pain around his spleen rating 2 or 3 out of 10  No vomiting  He took 650 mg Tylenol right after he had the 101 7 temp  Temp is now 98 6  Also, when he coughs or sneezes he gets intense shoulder pain  Discussed symptoms with Dr Miracle Lovell and his instructions are to go to ED  Pt states he understands and he will go

## 2022-04-12 NOTE — ASSESSMENT & PLAN NOTE
· BP slightly elevated at time of admission  · Continue Norvasc and losartan  · Patient reports he takes Lasix only when he has edema, hold for now

## 2022-04-13 LAB
ANION GAP SERPL CALCULATED.3IONS-SCNC: 8 MMOL/L (ref 4–13)
BUN SERPL-MCNC: 16 MG/DL (ref 5–25)
CALCIUM SERPL-MCNC: 9.1 MG/DL (ref 8.3–10.1)
CHLORIDE SERPL-SCNC: 105 MMOL/L (ref 100–108)
CO2 SERPL-SCNC: 26 MMOL/L (ref 21–32)
CREAT SERPL-MCNC: 1.08 MG/DL (ref 0.6–1.3)
ERYTHROCYTE [DISTWIDTH] IN BLOOD BY AUTOMATED COUNT: 14.2 % (ref 11.6–15.1)
GFR SERPL CREATININE-BSD FRML MDRD: 70 ML/MIN/1.73SQ M
GLUCOSE SERPL-MCNC: 115 MG/DL (ref 65–140)
GLUCOSE SERPL-MCNC: 152 MG/DL (ref 65–140)
GLUCOSE SERPL-MCNC: 169 MG/DL (ref 65–140)
GLUCOSE SERPL-MCNC: 178 MG/DL (ref 65–140)
GLUCOSE SERPL-MCNC: 196 MG/DL (ref 65–140)
HCT VFR BLD AUTO: 42.7 % (ref 36.5–49.3)
HGB BLD-MCNC: 13.9 G/DL (ref 12–17)
MCH RBC QN AUTO: 30.8 PG (ref 26.8–34.3)
MCHC RBC AUTO-ENTMCNC: 32.6 G/DL (ref 31.4–37.4)
MCV RBC AUTO: 95 FL (ref 82–98)
PLATELET # BLD AUTO: 157 THOUSANDS/UL (ref 149–390)
PMV BLD AUTO: 11.3 FL (ref 8.9–12.7)
POTASSIUM SERPL-SCNC: 3.7 MMOL/L (ref 3.5–5.3)
PROCALCITONIN SERPL-MCNC: 0.08 NG/ML
RBC # BLD AUTO: 4.52 MILLION/UL (ref 3.88–5.62)
S PNEUM AG UR QL: NEGATIVE
SODIUM SERPL-SCNC: 139 MMOL/L (ref 136–145)
WBC # BLD AUTO: 5.36 THOUSAND/UL (ref 4.31–10.16)

## 2022-04-13 PROCEDURE — 99232 SBSQ HOSP IP/OBS MODERATE 35: CPT | Performed by: HOSPITALIST

## 2022-04-13 PROCEDURE — 80048 BASIC METABOLIC PNL TOTAL CA: CPT | Performed by: PHYSICIAN ASSISTANT

## 2022-04-13 PROCEDURE — 82948 REAGENT STRIP/BLOOD GLUCOSE: CPT

## 2022-04-13 PROCEDURE — 87449 NOS EACH ORGANISM AG IA: CPT | Performed by: PHYSICIAN ASSISTANT

## 2022-04-13 PROCEDURE — 84145 PROCALCITONIN (PCT): CPT | Performed by: PHYSICIAN ASSISTANT

## 2022-04-13 PROCEDURE — 85027 COMPLETE CBC AUTOMATED: CPT | Performed by: PHYSICIAN ASSISTANT

## 2022-04-13 RX ADMIN — ALBUTEROL SULFATE 1 PUFF: 90 AEROSOL, METERED RESPIRATORY (INHALATION) at 21:31

## 2022-04-13 RX ADMIN — FLUTICASONE FUROATE AND VILANTEROL TRIFENATATE 1 PUFF: 200; 25 POWDER RESPIRATORY (INHALATION) at 09:04

## 2022-04-13 RX ADMIN — B-COMPLEX W/ C & FOLIC ACID TAB 1 TABLET: TAB at 09:04

## 2022-04-13 RX ADMIN — CEFTRIAXONE SODIUM 1000 MG: 10 INJECTION, POWDER, FOR SOLUTION INTRAVENOUS at 18:16

## 2022-04-13 RX ADMIN — GUAIFENESIN 600 MG: 600 TABLET, EXTENDED RELEASE ORAL at 21:33

## 2022-04-13 RX ADMIN — INSULIN LISPRO 2 UNITS: 100 INJECTION, SOLUTION INTRAVENOUS; SUBCUTANEOUS at 09:07

## 2022-04-13 RX ADMIN — LOSARTAN POTASSIUM 100 MG: 50 TABLET, FILM COATED ORAL at 09:04

## 2022-04-13 RX ADMIN — INSULIN LISPRO 1 UNITS: 100 INJECTION, SOLUTION INTRAVENOUS; SUBCUTANEOUS at 21:31

## 2022-04-13 RX ADMIN — PREDNISONE 5 MG: 5 TABLET ORAL at 09:04

## 2022-04-13 RX ADMIN — AMLODIPINE BESYLATE 10 MG: 10 TABLET ORAL at 09:04

## 2022-04-13 RX ADMIN — ALBUTEROL SULFATE 1 PUFF: 90 AEROSOL, METERED RESPIRATORY (INHALATION) at 16:44

## 2022-04-13 RX ADMIN — PANTOPRAZOLE SODIUM 40 MG: 40 TABLET, DELAYED RELEASE ORAL at 06:34

## 2022-04-13 RX ADMIN — INSULIN LISPRO 2 UNITS: 100 INJECTION, SOLUTION INTRAVENOUS; SUBCUTANEOUS at 16:44

## 2022-04-13 RX ADMIN — ALBUTEROL SULFATE 1 PUFF: 90 AEROSOL, METERED RESPIRATORY (INHALATION) at 12:24

## 2022-04-13 RX ADMIN — ALBUTEROL SULFATE 1 PUFF: 90 AEROSOL, METERED RESPIRATORY (INHALATION) at 09:16

## 2022-04-13 RX ADMIN — INSULIN LISPRO 2 UNITS: 100 INJECTION, SOLUTION INTRAVENOUS; SUBCUTANEOUS at 12:24

## 2022-04-13 RX ADMIN — FENOFIBRATE 145 MG: 145 TABLET, FILM COATED ORAL at 09:16

## 2022-04-13 RX ADMIN — FONDAPARINUX SODIUM 2.5 MG: 2.5 INJECTION, SOLUTION SUBCUTANEOUS at 09:04

## 2022-04-13 RX ADMIN — ASPIRIN 81 MG: 81 TABLET, COATED ORAL at 09:04

## 2022-04-13 RX ADMIN — FOLIC ACID TAB 400 MCG 800 MCG: 400 TAB at 09:04

## 2022-04-13 RX ADMIN — GABAPENTIN 600 MG: 300 CAPSULE ORAL at 09:04

## 2022-04-13 RX ADMIN — GUAIFENESIN 600 MG: 600 TABLET, EXTENDED RELEASE ORAL at 09:04

## 2022-04-13 RX ADMIN — CITALOPRAM HYDROBROMIDE 40 MG: 20 TABLET ORAL at 09:04

## 2022-04-13 RX ADMIN — INSULIN GLARGINE 17 UNITS: 100 INJECTION, SOLUTION SUBCUTANEOUS at 21:31

## 2022-04-13 NOTE — PROGRESS NOTES
[address] [facility]  Progress Note - Terodrigo Henry , 78 y o  male MRN: [id number]  Unit/Bed#: E5 -01 Encounter: [id number]  Primary Care Provider: Rachel Ruvalcaba DO   Date and time admitted to hospital: 2022  2:08 PM    * Multifocal pneumonia  Assessment & Plan  · Patient presented to ED with complaint of intermittent fevers and chills with shortness of breath  · Found to have multifocal pneumonia on CT scan, also with mild distal esophageal thickening, possibly indicating esophagitis    He is very quickly improving on IV Rocephin    He only has mild rhonchi now  Likely home in 1 to 2 days    Chronic obstructive pulmonary disease [facility])  Assessment & Plan  Not an acute exacerbation  No wheezing  Continue home regimen    Diabetes mellitus [facility])  Assessment & Plan  Lab Results   Component Value Date    HGBA1C 7 6 (H) 2022       Recent Labs     22  2051 22  2258 22  0854 22  1121   POCGLU 197* 167* 178* 152*       Blood Sugar Average: Last 72 hrs:  · (P) 173 5 maintained outpatient on Tresiba 17 units HS and Humalog sliding scale meals  · Will place on Lantus 15 units HS and Humalog sliding scale while inpatient  · Diabetic diet    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)  Assessment & Plan  · History of HIT  · Will provide DVT prophylaxis with Arixtra    CLL (chronic lymphocytic leukemia) (HCC)  Assessment & Plan  · Longstanding history of CLL maintained on Gazyva and Ibrutinib  · Hold given acute infection            Subjective:   Feels much better  Minimal SOB  Minimal cough  No fever or chills today      Objective:     Vitals:   Temp (24hrs), Av 9 °F (37 2 °C), Min:98 °F (36 7 °C), Max:99 4 °F (37 4 °C)    Temp:  [98 °F (36 7 °C)-99 4 °F (37 4 °C)] 99 2 °F (37 3 °C)  HR:  [77-93] 93  Resp:  [18-20] 18  BP: (118-164)/(71-85) 151/85  SpO2:  [92 %-97 %] 92 %  Body mass index is 29 44 kg/m²       Input and Output Summary (last 24 hours): No intake or output data in the 24 hours ending 04/13/22 1230    Physical Exam:     Physical Exam  Vitals and nursing note reviewed  HENT:      Head: Normocephalic and atraumatic  Eyes:      Pupils: Pupils are equal, round, and reactive to light  Cardiovascular:      Rate and Rhythm: Normal rate and regular rhythm  Heart sounds: No murmur heard  No friction rub  No gallop  Pulmonary:      Effort: Pulmonary effort is normal       Breath sounds: Rhonchi (mild scatterred rhonchi bilatera) present  No wheezing or rales  Abdominal:      General: Bowel sounds are normal       Palpations: Abdomen is soft  Tenderness: There is no abdominal tenderness  Musculoskeletal:      Right lower leg: No edema  Left lower leg: No edema          Additional Data:     Labs:    Results from last 7 days   Lab Units 04/13/22  0640 04/12/22  1450 04/12/22  1450   WBC Thousand/uL 5 36   < > 5 81   HEMOGLOBIN g/dL 13 9   < > 13 5   HEMATOCRIT % 42 7   < > 41 3   PLATELETS Thousands/uL 157   < > 143*   LYMPHO PCT %  --   --  8*   MONO PCT %  --   --  14*   EOS PCT %  --   --  6    < > = values in this interval not displayed  Results from last 7 days   Lab Units 04/13/22  0640 04/12/22  1450 04/12/22  1450   POTASSIUM mmol/L 3 7   < > 3 8   CHLORIDE mmol/L 105   < > 105   CO2 mmol/L 26   < > 26   BUN mg/dL 16   < > 20   CREATININE mg/dL 1 08   < > 1 27   CALCIUM mg/dL 9 1   < > 8 6   ALK PHOS U/L  --   --  54   ALT U/L  --   --  29   AST U/L  --   --  20    < > = values in this interval not displayed  Results from last 7 days   Lab Units 04/12/22  1450   INR  1 14     Results from last 7 days   Lab Units 04/13/22  1121 04/13/22  0854 04/12/22  2258 04/12/22  2051   POC GLUCOSE mg/dl 152* 178* 167* 197*               * I Have Reviewed All Lab Data     Recent Cultures (last 7 days):     Results from last 7 days   Lab Units 04/12/22  1455 04/12/22  1450   BLOOD CULTURE  Received in Microbiology Lab  Culture in Progress  Received in Microbiology Lab  Culture in Progress           Last 24 Hours Medication List:   Current Facility-Administered Medications   Medication Dose Route Frequency Provider Last Rate    acetaminophen  650 mg Oral Q6H PRN Елена Drafts, PA-C      albuterol  1 puff Inhalation 4x Daily Елена Drafts, PA-C      albuterol  2 5 mg Nebulization Q6H PRN Елена Drafts, PA-C      amLODIPine  10 mg Oral Daily Brynn Drafts, PA-C      aspirin  81 mg Oral Daily Brynn Drafts, PA-C      benzonatate  200 mg Oral TID PRN Елена Drafts, PA-C      calcium carbonate  1,000 mg Oral Daily PRN Елена Drafts, PA-C      cefTRIAXone  1,000 mg Intravenous Q24H Brynn Drafts, PA-C      citalopram  40 mg Oral Daily Brynn Drafts, PA-C      fenofibrate  145 mg Oral Daily Brynn Drafts, PA-C      fluticasone-vilanterol  1 puff Inhalation Daily Еленаnn Drafts, [place]      folic acid  604 mcg Oral Daily Brynn Drafts, PA-C      fondaparinux  2 5 mg Subcutaneous Daily Brynn Drafts, PA-C      gabapentin  600 mg Oral Daily Brynn Drafts, PA-C      guaiFENesin  600 mg Oral Q12H [address] Brynn Drafts, PA-C      insulin glargine  17 Units Subcutaneous HS Brynn Drafts, PA-C      insulin lispro  1-6 Units Subcutaneous HS Brynn Drafts, PA-C      insulin lispro  2-12 Units Subcutaneous TID AC Brynn Drafts, PA-C      LORazepam  0 5 mg Oral Daily PRN Brynn Drafts, PA-C      losartan  100 mg Oral Daily Brynn Drafts, PA-C      multivitamin stress formula  1 tablet Oral Daily Brynn Drafts, PA-C      ondansetron  4 mg Intravenous Q6H PRN Елена Drafts, PA-C      oxyCODONE  10 mg Oral Q6H PRN Елена Drafts, PA-C      pantoprazole  40 mg Oral Daily Before Breakfast Brynn Drafts, PA-C      polyethylene glycol  17 g Oral Daily PRN Brynn Drafts, PA-C      predniSONE  5 mg Oral Daily Brynn Drafts, PA-C      zolpidem  5 mg Oral HS PRN Jonatan Sterling PA-C           VTE Pharmacologic Prophylaxis:   Pharmacologic: Fondaparinux (Arixtra)      Current Length of Stay: 1 day(s)    Current Patient Status: Inpatient       Discharge Plan: home in 1 to 2 days  Could transition to po Augmentin    Code Status: Level 1 - Full Code           Today, Patient Was Seen By: Michael Steiner DO    ** Please Note: Dictation voice to text software may have been used in the creation of this document   **

## 2022-04-13 NOTE — H&P
[facility]  H&P- Son Daniel [birth year], 78 y o  male MRN: [id number]  Unit/Bed#: [facility] Encounter: [id number]  Primary Care Provider: Naresh Castillo DO   Date and time admitted to hospital: 4/12/2022  2:08 PM    * Multifocal pneumonia  Assessment & Plan  · Patient presented to ED with complaint of intermittent fevers and chills with shortness of breath  · Found to have multifocal pneumonia on CT scan, also with mild distal esophageal thickening, possibly indicating esophagitis  · Started on IV Rocephin, continue  · Mucinex, p r n  Tessalon Perles  · Monitor fever curve, follow-up blood cultures  · Check procalcitonin  · Check urine antigens  · SpO2 stable on room air at time of admission    Chronic obstructive pulmonary disease (HCC)  Assessment & Plan  · History of COPD, does not appear in exacerbation at this time  · Continue home inhalers with p r n  Albuterol nebulizers    Diabetes mellitus ([facility] )  Assessment & Plan  Lab Results   Component Value Date    HGBA1C 7 6 (H) 02/22/2022       No results for input(s): POCGLU in the last 72 hours      Blood Sugar Average: Last 72 hrs:  ·  maintained outpatient on Tresiba 17 units HS and Humalog sliding scale meals  · Will place on Lantus 15 units HS and Humalog sliding scale while inpatient  · Diabetic diet    Gastroesophageal reflux disease without esophagitis  Assessment & Plan  · Continue daily PPI  · Patient findings suggesting esophagitis on CT scan, recommend outpatient GI  · Patient does report he has intermittent EGDs completed as needed with dilation of his esophagus    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[facility] )  Assessment & Plan  · History of HIT  · Will provide DVT prophylaxis with Arixtra    Chronic kidney disease  Assessment & Plan  Lab Results   Component Value Date    EGFR 58 04/12/2022    EGFR 60 03/22/2022    EGFR 70 02/22/2022    CREATININE 1 27 04/12/2022    CREATININE 1 22 03/22/2022    CREATININE 1 08 02/22/2022 · Creatinine stable at 1 27    Hypertension  Assessment & Plan  · BP slightly elevated at time of admission  · Continue Norvasc and losartan  · Patient reports he takes Lasix only when he has edema, hold for now    CLL (chronic lymphocytic leukemia) ([facility] )  Assessment & Plan  · Longstanding history of CLL maintained on Gazyva and Ibrutinib  · Hold given acute infection  · Blood count stable, continue monitoring  · Continue outpatient hematology follow-up    CAD (coronary artery disease)  Assessment & Plan  · Patient denies any chest pain at time of admission  · Continue daily aspirin    VTE Pharmacologic Prophylaxis: VTE Score: 5 High Risk (Score >/= 5) - Pharmacological DVT Prophylaxis Ordered: argatroban  Sequential Compression Devices Ordered  Code Status: Prior full code  Discussion with family: None  Anticipated Length of Stay: Patient will be admitted on an inpatient basis with an anticipated length of stay of greater than 2 midnights secondary to Multifocal pneumonia  Total Time for Visit, including Counseling / Coordination of Care: 60 minutes Greater than 50% of this total time spent on direct patient counseling and coordination of care  Chief Complaint:  Fevers and chills    History of Present Illness:  Sherri Beltran is a 78 y o  male with a PMH of diabetes, hypertension, CLL, CAD who presents with fevers and chills  Patient reports for the past 2 days he has been noticing intermittent fevers and chills  He does report some intermittent shortness of breath and coughing but does also report history of COPD sent notes this is relatively common for him  He reports his fever went up to 103° F today  Has been taking Tylenol intermittently with improvement but continues to have significant chills  Does report he has been taking oral chemotherapy for his CLL for approximately last 20 years  He does report 1 episode of vomiting on the 1st day of symptoms      Review of Systems:  Review of Systems   Constitutional: Positive for chills and fever  HENT: Negative for trouble swallowing  Eyes: Negative for visual disturbance  Respiratory: Positive for cough and shortness of breath  Cardiovascular: Negative for chest pain and leg swelling  Gastrointestinal: Positive for vomiting  Negative for abdominal pain  Genitourinary: Negative for difficulty urinating  Musculoskeletal: Negative for back pain and gait problem  Skin: Negative for rash  Allergic/Immunologic: Positive for immunocompromised state  Neurological: Negative for dizziness and weakness  Psychiatric/Behavioral: Negative for confusion         Past Medical and Surgical History:   Past Medical History:   Diagnosis Date    Allergic     Anemia     Arthritis     CAD (coronary artery disease) 2004    Cancer ([place] [address] )     Leukemia    Cellulitis of scalp     CKD (chronic kidney disease) stage 3, GFR 30-59 ml/min ([facility])     CLL (chronic lymphocytic leukemia) ([facility])     COPD (chronic obstructive pulmonary disease) ([place] [address] )     Diabetes mellitus ([place] [address] )     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    Dyslipidemia     Edema extremities     Esophageal ulcer     H/O blood clots     Hemolytic anemia (HCC)     Hiatal hernia     History of TIA (transient ischemic attack)     Hyperlipidemia     Hypertension     Listeria infection 2018    Multiple gastric ulcers     Myocardial infarction ([place] [address] ) 2004    acute    Neutropenia ([facility])     Obese     Recurrent sinusitis     Sleep apnea     Thrombocytopenia (HCC)     Vertigo        Past Surgical History:   Procedure Laterality Date    ARTHROSCOPIC REPAIR ACL      lt knee    CARDIAC SURGERY      cardiac cath with stent    FOOT SURGERY      IR PORT CHECK  5/17/2019    KNEE ARTHROSCOPY      OTHER SURGICAL HISTORY      cardiac cath lesion 1, 1st adjunct treat device: stent    PORTACATH PLACEMENT      ND ESOPHAGOGASTRODUODENOSCOPY TRANSORAL DIAGNOSTIC N/A 10/5/2017    Procedure: ESOPHAGOGASTRODUODENOSCOPY (EGD) with bx;  Surgeon: Joshua Schultz DO;  [facility]; Service: Gastroenterology    [facility] ESOPHAGOGASTRODUODENOSCOPY TRANSORAL DIAGNOSTIC N/A 3/8/2018    Procedure: ESOPHAGOGASTRODUODENOSCOPY (EGD) with biopsy;  Surgeon: Joshua Schultz DO;  [facility]; Service: Gastroenterology    [facility] ESOPHAGOGASTRODUODENOSCOPY TRANSORAL DIAGNOSTIC N/A 6/20/2018    Procedure: ESOPHAGOGASTRODUODENOSCOPY (EGD) with Dilation;  Surgeon: Joshua Schultz DO;  [facility]; Service: Gastroenterology    [facility] ESOPHAGOGASTRODUODENOSCOPY TRANSORAL DIAGNOSTIC N/A 10/18/2018    Procedure: ESOPHAGOGASTRODUODENOSCOPY (EGD) with dilation;  Surgeon: Cris Claire MD;  [facility]; Service: Gastroenterology    [facility] ESOPHAGOSCOPY FLEXIBLE TRANSORAL WITH BIOPSY N/A 9/2/2016    Procedure: ESOPHAGOGASTRODUODENOSCOPY (EGD); ESOPHAGEAL DILATION; ESOPHAGEAL BIOPSY;  Surgeon: Slime Hermosillo MD;  [facility];  Service: Thoracic    TONSILLECTOMY      UPPER GASTROINTESTINAL ENDOSCOPY      x 6       Meds/Allergies:  Prior to Admission medications    Medication Sig Start Date End Date Taking?  Authorizing Provider   albuterol (2 5 mg/3 mL) 0 083 % nebulizer solution Take 3 mL (2 5 mg total) by nebulization every 6 (six) hours as needed for wheezing or shortness of breath 1/31/22  Yes Halley Giron MD   amLODIPine (NORVASC) 10 mg tablet TAKE 1 TABLET DAILY 3/28/22  Yes Gena Gill MD   aspirin 81 MG tablet Take 81 mg by mouth every other day Every other day    Yes Historical Provider, MD   citalopram (CeleXA) 40 mg tablet TAKE 1 TABLET DAILY 9/22/21  Yes Halley Giron MD   fenofibrate (TRICOR) 145 mg tablet TAKE 1 TABLET DAILY 5/2/21  Yes Gena Gill MD   folic acid (FOLVITE) 1 mg tablet Take 800 mcg by mouth daily    Yes Historical Provider, MD   gabapentin (NEURONTIN) 600 MG tablet Take 1 tablet (600 mg total) by mouth daily 6/8/21  Yes Nanda Oneal MD   insulin degludec Harris Hiram FlexTouch) 100 units/mL injection pen Inject 15 Units under the skin daily at bedtime  Patient taking differently: Inject 17 Units under the skin daily at bedtime   3/14/22  Yes Diana Joyner PA-C   insulin lispro (HumaLOG) 100 units/mL injection pen Use 5-10 units before meals as needed for steroid induced hyperglycemia  Patient taking differently: Use 5-17 units before meals as needed for steroid induced hyperglycemia  2/15/22  Yes Diana Joyner PA-C   LORazepam (ATIVAN) 0 5 mg tablet Take 1 tablet (0 5 mg total) by mouth daily as needed for anxiety 2/28/22  Yes Castillo Pandey MD   losartan (COZAAR) 100 MG tablet TAKE 1 TABLET DAILY 3/24/21  Yes Gwendolyn Jolly MD   mometasone-formoterol Pham Decent) 200-5 MCG/ACT inhaler Inhale 2 puffs 2 (two) times a day Rinse mouth after use  2/25/22  Yes Castillo Pandey MD   multivitamin [facility]) TABS Take 1 tablet by mouth daily   Yes Historical Provider, MD   obinutuzumab (GAZYVA) 1000 mg/40 mL SOLN Infuse into a venous catheter   Yes Historical Provider, MD   oxyCODONE (ROXICODONE) 10 MG TABS Take 1 tablet (10 mg total) by mouth every 6 (six) hours as needed for moderate pain For ongoing pain controlMax Daily Amount: 40 mg 4/21/21  Yes Lyndsay Siegel PA-C   predniSONE 5 mg tablet TAKE 1 TABLET DAILY 5/25/21  Yes Itzel Lovell PA-C   Ventolin  (64 Base) MCG/ACT inhaler Inhale 1 puff 4 (four) times a day 3/1/22  Yes Castillo Pandey MD   zolpidem (AMBIEN) 5 mg tablet Take 1 tablet (5 mg total) by mouth daily at bedtime as needed for sleep 2/21/20  Yes Castillo Pandey MD   acyclovir (ZOVIRAX) 400 MG tablet TAKE 1 TABLET TWICE A DAY 2/19/21 2/19/22  Aletha Perkins MD   benzonatate (TESSALON) 200 MG capsule Take 1 capsule (200 mg total) by mouth 3 (three) times a day as needed for cough 1/5/22   Castillo Pandey MD   furosemide (LASIX) 40 mg tablet TAKE 1 TABLET DAILY  Patient not taking: Reported on 4/12/2022 1/6/21   Ania Marie MD glucose monitoring kit (FREESTYLE) monitoring kit 1 each by Does not apply route as needed ( ) E 11 9 9/20/17   Eulalia De La Cruz MD   guaifenesin-codeine (GUAIFENESIN AC) 100-10 MG/5ML liquid Take 5 mL by mouth 3 (three) times a day as needed for cough 1/5/22   Grisel Suggs MD   Ibrutinib 140 MG TABS Take 140 mg by mouth daily  Patient not taking: Reported on 3/9/2022  10/22/21 3/19/22  Grace Ramirez MD   Ibrutinib 140 MG TABS Take 140 mg by mouth daily 10/22/21 3/19/22  Ragini Siegel PA-C   Insulin Pen Needle (BD Pen Needle Inez U/F) 32G X 4 MM MISC Use 3 (three) times a day 2/12/21   Catalina Randolph PA-C   Insulin Syringe-Needle U-100 30G X 1/2" 0 3 ML MISC by Does not apply route 2 (two) times a day 12/5/18   TEODORO Pérez   Lancets (FREESTYLE) lancets Use as instructed--test 2 times a day    E 11 9 9/20/17   Eulalia De La Cruz MD   naloxone (NARCAN) 4 mg/0 1 mL nasal spray Administer 1 spray into a nostril  If no response after 2-3 minutes, give another dose in the other nostril using a new spray  9/7/21   Grisel Suggs MD   nystatin (MYCOSTATIN) 500,000 units/5 mL suspension Apply 2 mL (200,000 Units total) to the mouth or throat 4 (four) times a day 1/21/22   Grisel Suggs MD   pantoprazole (PROTONIX) 40 mg tablet TAKE 1 TABLET DAILY 12/7/21   Delroy Willis PA-C   Potassium (POTASSIMIN PO) Take 20 mEq by mouth daily      Historical Provider, MD   predniSONE 20 mg tablet 20 mg one pill a day for 3 days then 10 mg po a day for 3 days then 5 mg po daily for 3 days  Patient not taking: Reported on 4/12/2022 1/21/22   Grisel Suggs MD   sodium chloride, PF, 0 9 % 10 mL by Intracatheter route see administration instructions 10mL into port before and after ampicillin doses 4/27/18   Caro Aponte,      I have reviewed home medications with patient personally  Allergies:    Allergies   Allergen Reactions    Heparin Other (See Comments)     Other reaction(s): Blood Disorders  clot    Macrolides And Ketolides Other (See Comments)     Interacts with other meds he is taking    Simvastatin Myalgia       Social History:  Marital Status: /Civil Union   Occupation:  Unknown  Patient Pre-hospital Living Situation: Home  Patient Pre-hospital Level of Mobility: walks  Patient Pre-hospital Diet Restrictions:  Diabetic  Substance Use History:   Social History     Substance and Sexual Activity   Alcohol Use Yes    Alcohol/week: 2 0 standard drinks    Types: 2 Cans of beer per week    Comment: social use as per [employer]     Social History     Tobacco Use   Smoking Status Former Smoker    Packs/day: 2 00    Years: 33 00    Pack years: 66 00    Types: Cigarettes    Start date: 1    Quit date:     Years since quittin 2   Smokeless Tobacco Never Used     Social History     Substance and Sexual Activity   Drug Use No       Family History:  Family History   Problem Relation Age of Onset    Leukemia Mother         chronic    Colon polyps Mother         sidmoid    Parkinsonism Father        Physical Exam:     Vitals:   Blood Pressure: 159/74 (22)  Pulse: 93 (22)  Temperature: 98 3 °F (36 8 °C) (22 161)  Temp Source: Oral (22)  Respirations: 19 (22)  Weight - Scale: 101 kg (223 lb 1 7 oz) (22 1351)  SpO2: 95 % (22)    Physical Exam  Vitals reviewed  Constitutional:       General: He is not in acute distress  HENT:      Head: Normocephalic and atraumatic  Eyes:      General: No scleral icterus  Conjunctiva/sclera: Conjunctivae normal    Cardiovascular:      Rate and Rhythm: Normal rate and regular rhythm  Heart sounds: No murmur heard  Pulmonary:      Effort: Pulmonary effort is normal  No respiratory distress  Breath sounds: Normal breath sounds  No wheezing  Abdominal:      General: Bowel sounds are normal  There is no distension  Palpations: Abdomen is soft  Tenderness:  There is no abdominal tenderness  Musculoskeletal:      Cervical back: Neck supple  Right lower leg: No edema  Left lower leg: No edema  Skin:     General: Skin is warm and dry  Neurological:      Mental Status: He is alert and oriented to person, place, and time  Psychiatric:         Mood and Affect: Mood normal          Behavior: Behavior normal           Additional Data:     Lab Results:  Results from last 7 days   Lab Units 04/12/22  1450   WBC Thousand/uL 5 81   HEMOGLOBIN g/dL 13 5   HEMATOCRIT % 41 3   PLATELETS Thousands/uL 143*   LYMPHO PCT % 8*   MONO PCT % 14*   EOS PCT % 6     Results from last 7 days   Lab Units 04/12/22  1450   SODIUM mmol/L 138   POTASSIUM mmol/L 3 8   CHLORIDE mmol/L 105   CO2 mmol/L 26   BUN mg/dL 20   CREATININE mg/dL 1 27   ANION GAP mmol/L 7   CALCIUM mg/dL 8 6   ALBUMIN g/dL 3 2*   TOTAL BILIRUBIN mg/dL 0 41   ALK PHOS U/L 54   ALT U/L 29   AST U/L 20   GLUCOSE RANDOM mg/dL 244*     Results from last 7 days   Lab Units 04/12/22  1450   INR  1 14             Results from last 7 days   Lab Units 04/12/22  1450   LACTIC ACID mmol/L 0 7       Imaging: Reviewed radiology reports from this admission including: chest CT scan and abdominal/pelvic CT  CT chest abdomen pelvis w contrast   Final Result by Wandy Pringle DO (04/12 1822)      1  Findings suggesting multifocal pneumonia  Follow-up advised to ensure resolution  2  No acute intra-abdominal or pelvic pathology  3  Mild distal esophageal thickening, similar in retrospect, possibly indicative of esophagitis  This can be confirmed with esophagram or endoscopy if clinically relevant  4  Fatty liver  5  Colonic diverticulosis without diverticulitis  Workstation performed: [id number]         XR chest 1 view portable    (Results Pending)       EKG and Other Studies Reviewed on Admission:   · EKG: No EKG obtained      ** Please Note: This note has been constructed using a voice recognition system   **

## 2022-04-13 NOTE — PLAN OF CARE
Problem: Potential for Falls  Goal: Patient will remain free of falls  Description: INTERVENTIONS:  - Educate patient/family on patient safety including physical limitations  - Instruct patient to call for assistance with activity   - Consult OT/PT to assist with strengthening/mobility   - Keep Call bell within reach  - Keep bed low and locked with side rails adjusted as appropriate  - Keep care items and personal belongings within reach  - Initiate and maintain comfort rounds  - Make Fall Risk Sign visible to staff  - Offer Toileting every 2 Hours, in advance of need  - Initiate/Maintain alarm  - Obtain necessary fall risk management equipment:   - Apply yellow socks and bracelet for high fall risk patients  - Consider moving patient to room near nurses station  Outcome: Progressing     Problem: PAIN - ADULT  Goal: Verbalizes/displays adequate comfort level or baseline comfort level  Description: Interventions:  - Encourage patient to monitor pain and request assistance  - Assess pain using appropriate pain scale  - Administer analgesics based on type and severity of pain and evaluate response  - Implement non-pharmacological measures as appropriate and evaluate response  - Consider cultural and social influences on pain and pain management  - Notify physician/advanced practitioner if interventions unsuccessful or patient reports new pain  Outcome: Progressing     Problem: INFECTION - ADULT  Goal: Absence or prevention of progression during hospitalization  Description: INTERVENTIONS:  - Assess and monitor for signs and symptoms of infection  - Monitor lab/diagnostic results  - Monitor all insertion sites, i e  indwelling lines, tubes, and drains  - Monitor endotracheal if appropriate and nasal secretions for changes in amount and color  - [place] appropriate cooling/warming therapies per order  - Administer medications as ordered  - Instruct and encourage patient and family to use good hand hygiene technique  - Identify and instruct in appropriate isolation precautions for identified infection/condition  Outcome: Progressing  Goal: Absence of fever/infection during neutropenic period  Description: INTERVENTIONS:  - Monitor WBC    Outcome: Progressing     Problem: SAFETY ADULT  Goal: Patient will remain free of falls  Description: INTERVENTIONS:  - Educate patient/family on patient safety including physical limitations  - Instruct patient to call for assistance with activity   - Consult OT/PT to assist with strengthening/mobility   - Keep Call bell within reach  - Keep bed low and locked with side rails adjusted as appropriate  - Keep care items and personal belongings within reach  - Initiate and maintain comfort rounds  - Make Fall Risk Sign visible to staff  - Offer Toileting every 2 Hours, in advance of need  - Initiate/Maintain alarm  - Obtain necessary fall risk management equipment:   - Apply yellow socks and bracelet for high fall risk patients  - Consider moving patient to room near nurses station  Outcome: Progressing  Goal: Maintain or return to baseline ADL function  Description: INTERVENTIONS:  -  Assess patient's ability to carry out ADLs; assess patient's baseline for ADL function and identify physical deficits which impact ability to perform ADLs (bathing, care of mouth/teeth, toileting, grooming, dressing, etc )  - Assess/evaluate cause of self-care deficits   - Assess range of motion  - Assess patient's mobility; develop plan if impaired  - Assess patient's need for assistive devices and provide as appropriate  - Encourage maximum independence but intervene and supervise when necessary  - Involve family in performance of ADLs  - Assess for home care needs following discharge   - Consider OT consult to assist with ADL evaluation and planning for discharge  - Provide patient education as appropriate  Outcome: Progressing  Goal: Maintains/Returns to pre admission functional level  Description: INTERVENTIONS:  - Perform BMAT or MOVE assessment daily    - Set and communicate daily mobility goal to care team and patient/family/caregiver  - Collaborate with rehabilitation services on mobility goals if consulted  - Perform Range of Motion 5 times a day  - Reposition patient every 2 hours  - Dangle patient 3 times a day  - Stand patient 3 times a day  - Ambulate patient 3 times a day  - Out of bed to chair 3 times a day   - Out of bed for meals 3 times a day  - Out of bed for toileting  - Record patient progress and toleration of activity level   Outcome: Progressing     Problem: DISCHARGE PLANNING  Goal: Discharge to home or other facility with appropriate resources  Description: INTERVENTIONS:  - Identify barriers to discharge w/patient and caregiver  - Arrange for needed discharge resources and transportation as appropriate  - Identify discharge learning needs (meds, wound care, etc )  - Arrange for interpretive services to assist at discharge as needed  - Refer to Case Management Department for coordinating discharge planning if the patient needs post-hospital services based on physician/advanced practitioner order or complex needs related to functional status, cognitive ability, or social support system  Outcome: Progressing     Problem: Knowledge Deficit  Goal: Patient/family/caregiver demonstrates understanding of disease process, treatment plan, medications, and discharge instructions  Description: Complete learning assessment and assess knowledge base    Interventions:  - Provide teaching at level of understanding  - Provide teaching via preferred learning methods  Outcome: Progressing

## 2022-04-13 NOTE — ASSESSMENT & PLAN NOTE
· Longstanding history of CLL maintained on Gazyva and Ibrutinib  · Hold given acute infection  · Blood count stable, continue monitoring  · Continue outpatient hematology follow-up

## 2022-04-13 NOTE — ASSESSMENT & PLAN NOTE
Lab Results   Component Value Date    HGBA1C 7 6 (H) 02/22/2022       No results for input(s): POCGLU in the last 72 hours      Blood Sugar Average: Last 72 hrs:  ·  maintained outpatient on Tresiba 17 units HS and Humalog sliding scale meals  · Will place on Lantus 15 units HS and Humalog sliding scale while inpatient  · Diabetic diet

## 2022-04-13 NOTE — PLAN OF CARE
Problem: Potential for Falls  Goal: Patient will remain free of falls  Description: INTERVENTIONS:  - Educate patient/family on patient safety including physical limitations  - Instruct patient to call for assistance with activity   - Consult OT/PT to assist with strengthening/mobility   - Keep Call bell within reach  - Keep bed low and locked with side rails adjusted as appropriate  - Keep care items and personal belongings within reach  - Initiate and maintain comfort rounds  - Make Fall Risk Sign visible to staff  - Offer Toileting every 2 Hours, in advance of need  - Apply yellow socks and bracelet for high fall risk patients  - Consider moving patient to room near nurses station  Outcome: Progressing     Problem: PAIN - ADULT  Goal: Verbalizes/displays adequate comfort level or baseline comfort level  Description: Interventions:  - Encourage patient to monitor pain and request assistance  - Assess pain using appropriate pain scale  - Administer analgesics based on type and severity of pain and evaluate response  - Implement non-pharmacological measures as appropriate and evaluate response  - Consider cultural and social influences on pain and pain management  - Notify physician/advanced practitioner if interventions unsuccessful or patient reports new pain  Outcome: Progressing     Problem: INFECTION - ADULT  Goal: Absence or prevention of progression during hospitalization  Description: INTERVENTIONS:  - Assess and monitor for signs and symptoms of infection  - Monitor lab/diagnostic results  - Monitor all insertion sites, i e  indwelling lines, tubes, and drains  - Monitor endotracheal if appropriate and nasal secretions for changes in amount and color  - [place] appropriate cooling/warming therapies per order  - Administer medications as ordered  - Instruct and encourage patient and family to use good hand hygiene technique  - Identify and instruct in appropriate isolation precautions for identified infection/condition  Outcome: Progressing  Goal: Absence of fever/infection during neutropenic period  Description: INTERVENTIONS:  - Monitor WBC    Outcome: Progressing     Problem: SAFETY ADULT  Goal: Patient will remain free of falls  Description: INTERVENTIONS:  - Educate patient/family on patient safety including physical limitations  - Instruct patient to call for assistance with activity   - Consult OT/PT to assist with strengthening/mobility   - Keep Call bell within reach  - Keep bed low and locked with side rails adjusted as appropriate  - Keep care items and personal belongings within reach  - Initiate and maintain comfort rounds  - Make Fall Risk Sign visible to staff  - Offer Toileting every 2 Hours, in advance of need  - Apply yellow socks and bracelet for high fall risk patients  - Consider moving patient to room near nurses station  Outcome: Progressing  Goal: Maintain or return to baseline ADL function  Description: INTERVENTIONS:  -  Assess patient's ability to carry out ADLs; assess patient's baseline for ADL function and identify physical deficits which impact ability to perform ADLs (bathing, care of mouth/teeth, toileting, grooming, dressing, etc )  - Assess/evaluate cause of self-care deficits   - Assess range of motion  - Assess patient's mobility; develop plan if impaired  - Assess patient's need for assistive devices and provide as appropriate  - Encourage maximum independence but intervene and supervise when necessary  - Involve family in performance of ADLs  - Assess for home care needs following discharge   - Consider OT consult to assist with ADL evaluation and planning for discharge  - Provide patient education as appropriate  Outcome: Progressing  Goal: Maintains/Returns to pre admission functional level  Description: INTERVENTIONS:  - Perform BMAT or MOVE assessment daily    - Set and communicate daily mobility goal to care team and patient/family/caregiver     - Collaborate with rehabilitation services on mobility goals if consulted  - Perform Range of Motion 3 times a day  - Reposition patient every 2 hours  - Dangle patient 3 times a day  - Stand patient 3 times a day  - Ambulate patient 3 times a day  - Out of bed to chair 3 times a day   - Out of bed for meals 3 times a day  - Out of bed for toileting  - Record patient progress and toleration of activity level   Outcome: Progressing     Problem: DISCHARGE PLANNING  Goal: Discharge to home or other facility with appropriate resources  Description: INTERVENTIONS:  - Identify barriers to discharge w/patient and caregiver  - Arrange for needed discharge resources and transportation as appropriate  - Identify discharge learning needs (meds, wound care, etc )  - Arrange for interpretive services to assist at discharge as needed  - Refer to Case Management Department for coordinating discharge planning if the patient needs post-hospital services based on physician/advanced practitioner order or complex needs related to functional status, cognitive ability, or social support system  Outcome: Progressing     Problem: Knowledge Deficit  Goal: Patient/family/caregiver demonstrates understanding of disease process, treatment plan, medications, and discharge instructions  Description: Complete learning assessment and assess knowledge base  Interventions:  - Provide teaching at level of understanding  - Provide teaching via preferred learning methods  Outcome: Progressing     Problem: Nutrition/Hydration-ADULT  Goal: Nutrient/Hydration intake appropriate for improving, restoring or maintaining nutritional needs  Description: Monitor and assess patient's nutrition/hydration status for malnutrition  Collaborate with interdisciplinary team and initiate plan and interventions as ordered  Monitor patient's weight and dietary intake as ordered or per policy  Utilize nutrition screening tool and intervene as necessary   Determine patient's food preferences and provide high-protein, high-caloric foods as appropriate       INTERVENTIONS:  - Monitor oral intake, urinary output, labs, and treatment plans  - Assess nutrition and hydration status and recommend course of action  - Evaluate amount of meals eaten  - Assist patient with eating if necessary   - Allow adequate time for meals  - Recommend/ encourage appropriate diets, oral nutritional supplements, and vitamin/mineral supplements  - Order, calculate, and assess calorie counts as needed  - Recommend, monitor, and adjust tube feedings and TPN/PPN based on assessed needs  - Assess need for intravenous fluids  - Provide specific nutrition/hydration education as appropriate  - Include patient/family/caregiver in decisions related to nutrition  Outcome: Progressing

## 2022-04-13 NOTE — ASSESSMENT & PLAN NOTE
· History of COPD, does not appear in exacerbation at this time  · Continue home inhalers with p r n   Albuterol nebulizers

## 2022-04-13 NOTE — ASSESSMENT & PLAN NOTE
Lab Results   Component Value Date    EGFR 58 04/12/2022    EGFR 60 03/22/2022    EGFR 70 02/22/2022    CREATININE 1 27 04/12/2022    CREATININE 1 22 03/22/2022    CREATININE 1 08 02/22/2022     · Creatinine stable at 1 27

## 2022-04-13 NOTE — ASSESSMENT & PLAN NOTE
· Patient presented to ED with complaint of intermittent fevers and chills with shortness of breath  · Found to have multifocal pneumonia on CT scan, also with mild distal esophageal thickening, possibly indicating esophagitis  · Started on IV Rocephin, continue  · Mucinex, p r n   Tessalon Perles  · Monitor fever curve, follow-up blood cultures  · Check procalcitonin  · Check urine antigens  · SpO2 stable on room air at time of admission

## 2022-04-13 NOTE — ASSESSMENT & PLAN NOTE
Lab Results   Component Value Date    HGBA1C 7 6 (H) 02/22/2022       Recent Labs     04/12/22 2051 04/12/22  2258 04/13/22  0854 04/13/22  1121   POCGLU 197* 167* 178* 152*       Blood Sugar Average: Last 72 hrs:  · (P) 173 5 maintained outpatient on Tresiba 17 units HS and Humalog sliding scale meals  · Will place on Lantus 15 units HS and Humalog sliding scale while inpatient  · Diabetic diet

## 2022-04-13 NOTE — ASSESSMENT & PLAN NOTE
· Continue daily PPI  · Patient findings suggesting esophagitis on CT scan, recommend outpatient GI  · Patient does report he has intermittent EGDs completed as needed with dilation of his esophagus

## 2022-04-14 LAB
ANION GAP SERPL CALCULATED.3IONS-SCNC: 10 MMOL/L (ref 4–13)
BUN SERPL-MCNC: 19 MG/DL (ref 5–25)
CALCIUM SERPL-MCNC: 8.9 MG/DL (ref 8.3–10.1)
CHLORIDE SERPL-SCNC: 106 MMOL/L (ref 100–108)
CO2 SERPL-SCNC: 24 MMOL/L (ref 21–32)
CREAT SERPL-MCNC: 1.04 MG/DL (ref 0.6–1.3)
ERYTHROCYTE [DISTWIDTH] IN BLOOD BY AUTOMATED COUNT: 13.9 % (ref 11.6–15.1)
GFR SERPL CREATININE-BSD FRML MDRD: 73 ML/MIN/1.73SQ M
GLUCOSE SERPL-MCNC: 115 MG/DL (ref 65–140)
GLUCOSE SERPL-MCNC: 149 MG/DL (ref 65–140)
GLUCOSE SERPL-MCNC: 164 MG/DL (ref 65–140)
GLUCOSE SERPL-MCNC: 179 MG/DL (ref 65–140)
GLUCOSE SERPL-MCNC: 189 MG/DL (ref 65–140)
GLUCOSE SERPL-MCNC: 194 MG/DL (ref 65–140)
HCT VFR BLD AUTO: 42.9 % (ref 36.5–49.3)
HGB BLD-MCNC: 13.7 G/DL (ref 12–17)
MAGNESIUM SERPL-MCNC: 2.1 MG/DL (ref 1.6–2.6)
MCH RBC QN AUTO: 29.7 PG (ref 26.8–34.3)
MCHC RBC AUTO-ENTMCNC: 31.9 G/DL (ref 31.4–37.4)
MCV RBC AUTO: 93 FL (ref 82–98)
PLATELET # BLD AUTO: 156 THOUSANDS/UL (ref 149–390)
PMV BLD AUTO: 11 FL (ref 8.9–12.7)
POTASSIUM SERPL-SCNC: 3.6 MMOL/L (ref 3.5–5.3)
RBC # BLD AUTO: 4.61 MILLION/UL (ref 3.88–5.62)
SODIUM SERPL-SCNC: 140 MMOL/L (ref 136–145)
WBC # BLD AUTO: 4.32 THOUSAND/UL (ref 4.31–10.16)

## 2022-04-14 PROCEDURE — 99232 SBSQ HOSP IP/OBS MODERATE 35: CPT | Performed by: INTERNAL MEDICINE

## 2022-04-14 PROCEDURE — 82948 REAGENT STRIP/BLOOD GLUCOSE: CPT

## 2022-04-14 PROCEDURE — 80048 BASIC METABOLIC PNL TOTAL CA: CPT | Performed by: HOSPITALIST

## 2022-04-14 PROCEDURE — 83735 ASSAY OF MAGNESIUM: CPT | Performed by: HOSPITALIST

## 2022-04-14 PROCEDURE — 85027 COMPLETE CBC AUTOMATED: CPT | Performed by: HOSPITALIST

## 2022-04-14 RX ORDER — INSULIN GLARGINE 100 [IU]/ML
20 INJECTION, SOLUTION SUBCUTANEOUS
Status: DISCONTINUED | OUTPATIENT
Start: 2022-04-14 | End: 2022-04-15 | Stop reason: HOSPADM

## 2022-04-14 RX ADMIN — ALBUTEROL SULFATE 1 PUFF: 90 AEROSOL, METERED RESPIRATORY (INHALATION) at 11:17

## 2022-04-14 RX ADMIN — ASPIRIN 81 MG: 81 TABLET, COATED ORAL at 08:10

## 2022-04-14 RX ADMIN — GUAIFENESIN 600 MG: 600 TABLET, EXTENDED RELEASE ORAL at 21:15

## 2022-04-14 RX ADMIN — B-COMPLEX W/ C & FOLIC ACID TAB 1 TABLET: TAB at 08:10

## 2022-04-14 RX ADMIN — AMLODIPINE BESYLATE 10 MG: 10 TABLET ORAL at 08:09

## 2022-04-14 RX ADMIN — FLUTICASONE FUROATE AND VILANTEROL TRIFENATATE 1 PUFF: 200; 25 POWDER RESPIRATORY (INHALATION) at 07:43

## 2022-04-14 RX ADMIN — INSULIN LISPRO 2 UNITS: 100 INJECTION, SOLUTION INTRAVENOUS; SUBCUTANEOUS at 16:05

## 2022-04-14 RX ADMIN — FONDAPARINUX SODIUM 2.5 MG: 2.5 INJECTION, SOLUTION SUBCUTANEOUS at 08:10

## 2022-04-14 RX ADMIN — INSULIN GLARGINE 20 UNITS: 100 INJECTION, SOLUTION SUBCUTANEOUS at 21:27

## 2022-04-14 RX ADMIN — PREDNISONE 5 MG: 5 TABLET ORAL at 08:09

## 2022-04-14 RX ADMIN — LOSARTAN POTASSIUM 100 MG: 50 TABLET, FILM COATED ORAL at 08:09

## 2022-04-14 RX ADMIN — CITALOPRAM HYDROBROMIDE 40 MG: 20 TABLET ORAL at 08:10

## 2022-04-14 RX ADMIN — CEFTRIAXONE SODIUM 1000 MG: 10 INJECTION, POWDER, FOR SOLUTION INTRAVENOUS at 17:23

## 2022-04-14 RX ADMIN — ALBUTEROL SULFATE 1 PUFF: 90 AEROSOL, METERED RESPIRATORY (INHALATION) at 07:44

## 2022-04-14 RX ADMIN — FENOFIBRATE 145 MG: 145 TABLET, FILM COATED ORAL at 08:10

## 2022-04-14 RX ADMIN — GUAIFENESIN 600 MG: 600 TABLET, EXTENDED RELEASE ORAL at 08:09

## 2022-04-14 RX ADMIN — FOLIC ACID TAB 400 MCG 800 MCG: 400 TAB at 08:09

## 2022-04-14 RX ADMIN — INSULIN LISPRO 2 UNITS: 100 INJECTION, SOLUTION INTRAVENOUS; SUBCUTANEOUS at 21:27

## 2022-04-14 RX ADMIN — INSULIN LISPRO 2 UNITS: 100 INJECTION, SOLUTION INTRAVENOUS; SUBCUTANEOUS at 07:43

## 2022-04-14 RX ADMIN — BENZONATATE 200 MG: 100 CAPSULE ORAL at 18:56

## 2022-04-14 RX ADMIN — INSULIN LISPRO 2 UNITS: 100 INJECTION, SOLUTION INTRAVENOUS; SUBCUTANEOUS at 11:17

## 2022-04-14 RX ADMIN — PANTOPRAZOLE SODIUM 40 MG: 40 TABLET, DELAYED RELEASE ORAL at 07:43

## 2022-04-14 RX ADMIN — GABAPENTIN 600 MG: 300 CAPSULE ORAL at 08:10

## 2022-04-14 NOTE — PLAN OF CARE
Problem: Potential for Falls  Goal: Patient will remain free of falls  Description: INTERVENTIONS:  - Educate patient/family on patient safety including physical limitations  - Instruct patient to call for assistance with activity   - Consult OT/PT to assist with strengthening/mobility   - Keep Call bell within reach  - Keep bed low and locked with side rails adjusted as appropriate  - Keep care items and personal belongings within reach  - Initiate and maintain comfort rounds  - Make Fall Risk Sign visible to staff  -  Outcome: Progressing     Problem: PAIN - ADULT  Goal: Verbalizes/displays adequate comfort level or baseline comfort level  Description: Interventions:  - Encourage patient to monitor pain and request assistance  - Assess pain using appropriate pain scale  - Administer analgesics based on type and severity of pain and evaluate response  - Implement non-pharmacological measures as appropriate and evaluate response  - Consider cultural and social influences on pain and pain management  - Notify physician/advanced practitioner if interventions unsuccessful or patient reports new pain  Outcome: Progressing     Problem: INFECTION - ADULT  Goal: Absence or prevention of progression during hospitalization  Description: INTERVENTIONS:  - Assess and monitor for signs and symptoms of infection  - Monitor lab/diagnostic results  - Monitor all insertion sites, i e  indwelling lines, tubes, and drains  - Monitor endotracheal if appropriate and nasal secretions for changes in amount and color  - [place] appropriate cooling/warming therapies per order  - Administer medications as ordered  - Instruct and encourage patient and family to use good hand hygiene technique  - Identify and instruct in appropriate isolation precautions for identified infection/condition  Outcome: Progressing  Goal: Absence of fever/infection during neutropenic period  Description: INTERVENTIONS:  - Monitor WBC    Outcome: Progressing Problem: SAFETY ADULT  Goal: Patient will remain free of falls  Description: INTERVENTIONS:  - Educate patient/family on patient safety including physical limitations  - Instruct patient to call for assistance with activity   - Consult OT/PT to assist with strengthening/mobility   - Keep Call bell within reach  - Keep bed low and locked with side rails adjusted as appropriate  - Keep care items and personal belongings within reach  - Initiate and maintain comfort rounds  - Make Fall Risk Sign visible to staff    Outcome: Progressing  Goal: Maintain or return to baseline ADL function  Description: INTERVENTIONS:  -  Assess patient's ability to carry out ADLs; assess patient's baseline for ADL function and identify physical deficits which impact ability to perform ADLs (bathing, care of mouth/teeth, toileting, grooming, dressing, etc )  - Assess/evaluate cause of self-care deficits   - Assess range of motion  - Assess patient's mobility; develop plan if impaired  - Assess patient's need for assistive devices and provide as appropriate  - Encourage maximum independence but intervene and supervise when necessary  - Involve family in performance of ADLs  - Assess for home care needs following discharge   - Consider OT consult to assist with ADL evaluation and planning for discharge  - Provide patient education as appropriate  Outcome: Progressing  Goal: Maintains/Returns to pre admission functional level  Description: INTERVENTIONS:  - Perform BMAT or MOVE assessment daily    - Set and communicate daily mobility goal to care team and patient/family/caregiver  - Collaborate with rehabilitation services on mobility goals if consulted  - Perform Range of Motion 3 times a day    -  - Stand patient 3 times a day  - Ambulate patient 3 times a day  - Out of bed to chair 3 times a day   - Out of bed for meals 3 times a day  - Out of bed for toileting  - Record patient progress and toleration of activity level   Outcome: Progressing     Problem: DISCHARGE PLANNING  Goal: Discharge to home or other facility with appropriate resources  Description: INTERVENTIONS:  - Identify barriers to discharge w/patient and caregiver  - Arrange for needed discharge resources and transportation as appropriate  - Identify discharge learning needs (meds, wound care, etc )  - Arrange for interpretive services to assist at discharge as needed  - Refer to Case Management Department for coordinating discharge planning if the patient needs post-hospital services based on physician/advanced practitioner order or complex needs related to functional status, cognitive ability, or social support system  Outcome: Progressing     Problem: Knowledge Deficit  Goal: Patient/family/caregiver demonstrates understanding of disease process, treatment plan, medications, and discharge instructions  Description: Complete learning assessment and assess knowledge base  Interventions:  - Provide teaching at level of understanding  - Provide teaching via preferred learning methods  Outcome: Progressing     Problem: Nutrition/Hydration-ADULT  Goal: Nutrient/Hydration intake appropriate for improving, restoring or maintaining nutritional needs  Description: Monitor and assess patient's nutrition/hydration status for malnutrition  Collaborate with interdisciplinary team and initiate plan and interventions as ordered  Monitor patient's weight and dietary intake as ordered or per policy  Utilize nutrition screening tool and intervene as necessary  Determine patient's food preferences and provide high-protein, high-caloric foods as appropriate       INTERVENTIONS:  - Monitor oral intake, urinary output, labs, and treatment plans  - Assess nutrition and hydration status and recommend course of action  - Evaluate amount of meals eaten  - Assist patient with eating if necessary   - Allow adequate time for meals  - Recommend/ encourage appropriate diets, oral nutritional supplements, and vitamin/mineral supplements  - Order, calculate, and assess calorie counts as needed  - Recommend, monitor, and adjust tube feedings and TPN/PPN based on assessed needs  - Assess need for intravenous fluids  - Provide specific nutrition/hydration education as appropriate  - Include patient/family/caregiver in decisions related to nutrition  Outcome: Progressing

## 2022-04-14 NOTE — PROGRESS NOTES
Progress Note - Amanda Oliveros 78 y o  male MRN: [id number]    Unit/Bed#: E5 -01 Encounter: [id number]    Assessment/Plan:    Multifocal pneumonia  reviewed CT of chest, continue IV antibiotic, negative strep pneumonia antigen await Legionella antigen     COPD    currently stable on room air no exacerbation    Diabetes   last 24 hours 115 to 179 blood glucose continue with Lantus and ADA diet    CLL    continue oncology follow-up    HIT    platelets stable 680 with Arixtra       Subjective:   Breathing better, less cough, no fevers chills no nausea vomiting no chest pain appetite is good      Objective:     Vitals: Blood pressure 148/79, pulse 77, temperature 98 2 °F (36 8 °C), resp  rate 19, height 6' 1" (1 854 m), weight 101 kg (223 lb 1 7 oz), SpO2 91 %  ,Body mass index is 29 44 kg/m²  Results from last 7 days   Lab Units 04/14/22  0451 04/13/22  0640 04/12/22  1450   WBC Thousand/uL 4 32   < > 5 81   HEMOGLOBIN g/dL 13 7   < > 13 5   HEMATOCRIT % 42 9   < > 41 3   PLATELETS Thousands/uL 156   < > 143*   INR   --   --  1 14    < > = values in this interval not displayed  Results from last 7 days   Lab Units 04/14/22  0451 04/13/22  0640 04/12/22  1450   POTASSIUM mmol/L 3 6   < > 3 8   CHLORIDE mmol/L 106   < > 105   CO2 mmol/L 24   < > 26   BUN mg/dL 19   < > 20   CREATININE mg/dL 1 04   < > 1 27   CALCIUM mg/dL 8 9   < > 8 6   ALK PHOS U/L  --   --  54   ALT U/L  --   --  29   AST U/L  --   --  20    < > = values in this interval not displayed         Scheduled Meds:  Current Facility-Administered Medications   Medication Dose Route Frequency Provider Last Rate    acetaminophen  650 mg Oral Q6H PRN Елена Clayton PA-C      albuterol  1 puff Inhalation 4x Daily Елена Clayton PA-C      albuterol  2 5 mg Nebulization Q6H PRN Елена Clayton PA-C      amLODIPine  10 mg Oral Daily Елена Clayton PA-C      aspirin  81 mg Oral Daily Елена Clayton PA-C      benzonatate  200 mg Oral TID PRN Mathias Dates, PA-C      calcium carbonate  1,000 mg Oral Daily PRN Mathias Dates, PA-C      cefTRIAXone  1,000 mg Intravenous Q24H Mathias Dates, PA-C 1,000 mg (04/13/22 1816)    citalopram  40 mg Oral Daily Mathias Dates, PA-C      fenofibrate  145 mg Oral Daily Mathias Dates, PA-C      fluticasone-vilanterol  1 puff Inhalation Daily Mathias Dates, [place]      folic acid  886 mcg Oral Daily Mathias Dates, PA-C      fondaparinux  2 5 mg Subcutaneous Daily Mathias Dates, PA-C      gabapentin  600 mg Oral Daily Mathias Dates, PA-C      guaiFENesin  600 mg Oral Q12H [address] Mathias Dates, PA-C      insulin glargine  17 Units Subcutaneous HS Mathias Dates, PA-C      insulin lispro  1-6 Units Subcutaneous HS Mathias Dates, PA-C      insulin lispro  2-12 Units Subcutaneous TID AC Mathias Dates, PA-C      LORazepam  0 5 mg Oral Daily PRN Mathias Dates, PA-C      losartan  100 mg Oral Daily Mathias Dates, PA-C      multivitamin stress formula  1 tablet Oral Daily Mathias Dates, PA-C      ondansetron  4 mg Intravenous Q6H PRN Mathias Dates, PA-C      oxyCODONE  10 mg Oral Q6H PRN Mathias Dates, PA-C      pantoprazole  40 mg Oral Daily Before Breakfast Mathias Dates, PA-C      polyethylene glycol  17 g Oral Daily PRN Mathias Dates, PA-C      predniSONE  5 mg Oral Daily Mathias Dates, PA-C      zolpidem  5 mg Oral HS PRN Mathias Dates, PA-C         Continuous Infusions:         Physical exam:  General appearance:  Alert no distress interaction appropriate  Head/Eyes:  Nonicteric PERRL EOMI  Neck:  Supple  Lungs:  Decreased BS bilateral no wheezing rhonchi or rales  Heart: normal S1 S2 regular  Abdomen: Soft nontender with bowel sounds  Extremities: no edema  Skin: no rash    Invasive Devices  Report    Central Venous Catheter Line            Port A Cath 01/01/10 Right Chest 4486 days                  VTE Pharmacologic Prophylaxis: Arixtra    Counseling / Coordination of Care  Total floor / unit time spent today 30  minutes  Greater than 50% of total time was spent with the patient and / or family counseling and / or coordination of care    A description of the counseling / coordination of care:

## 2022-04-14 NOTE — PLAN OF CARE
Problem: PAIN - ADULT  Goal: Verbalizes/displays adequate comfort level or baseline comfort level  Description: Interventions:  - Encourage patient to monitor pain and request assistance  - Assess pain using appropriate pain scale  - Administer analgesics based on type and severity of pain and evaluate response  - Implement non-pharmacological measures as appropriate and evaluate response  - Consider cultural and social influences on pain and pain management  - Notify physician/advanced practitioner if interventions unsuccessful or patient reports new pain  Outcome: Progressing     Problem: INFECTION - ADULT  Goal: Absence or prevention of progression during hospitalization  Description: INTERVENTIONS:  - Assess and monitor for signs and symptoms of infection  - Monitor lab/diagnostic results  - Monitor all insertion sites, i e  indwelling lines, tubes, and drains  - Monitor endotracheal if appropriate and nasal secretions for changes in amount and color  - [place] appropriate cooling/warming therapies per order  - Administer medications as ordered  - Instruct and encourage patient and family to use good hand hygiene technique  - Identify and instruct in appropriate isolation precautions for identified infection/condition  Outcome: Progressing  Goal: Absence of fever/infection during neutropenic period  Description: INTERVENTIONS:  - Monitor WBC    Outcome: Progressing     Problem: DISCHARGE PLANNING  Goal: Discharge to home or other facility with appropriate resources  Description: INTERVENTIONS:  - Identify barriers to discharge w/patient and caregiver  - Arrange for needed discharge resources and transportation as appropriate  - Identify discharge learning needs (meds, wound care, etc )  - Arrange for interpretive services to assist at discharge as needed  - Refer to Case Management Department for coordinating discharge planning if the patient needs post-hospital services based on physician/advanced practitioner order or complex needs related to functional status, cognitive ability, or social support system  Outcome: Progressing     Problem: Knowledge Deficit  Goal: Patient/family/caregiver demonstrates understanding of disease process, treatment plan, medications, and discharge instructions  Description: Complete learning assessment and assess knowledge base  Interventions:  - Provide teaching at level of understanding  - Provide teaching via preferred learning methods  Outcome: Progressing     Problem: Nutrition/Hydration-ADULT  Goal: Nutrient/Hydration intake appropriate for improving, restoring or maintaining nutritional needs  Description: Monitor and assess patient's nutrition/hydration status for malnutrition  Collaborate with interdisciplinary team and initiate plan and interventions as ordered  Monitor patient's weight and dietary intake as ordered or per policy  Utilize nutrition screening tool and intervene as necessary  Determine patient's food preferences and provide high-protein, high-caloric foods as appropriate       INTERVENTIONS:  - Monitor oral intake, urinary output, labs, and treatment plans  - Assess nutrition and hydration status and recommend course of action  - Evaluate amount of meals eaten  - Assist patient with eating if necessary   - Allow adequate time for meals  - Recommend/ encourage appropriate diets, oral nutritional supplements, and vitamin/mineral supplements  - Order, calculate, and assess calorie counts as needed  - Recommend, monitor, and adjust tube feedings and TPN/PPN based on assessed needs  - Assess need for intravenous fluids  - Provide specific nutrition/hydration education as appropriate  - Include patient/family/caregiver in decisions related to nutrition  Outcome: Progressing     Problem: RESPIRATORY - ADULT  Goal: Achieves optimal ventilation and oxygenation  Description: INTERVENTIONS:  - Assess for changes in respiratory status  - Assess for changes in mentation and behavior  - Position to facilitate oxygenation and minimize respiratory effort  - Oxygen administered by appropriate delivery if ordered  - Initiate smoking cessation education as indicated  - Encourage broncho-pulmonary hygiene including cough, deep breathe, Incentive Spirometry  - Assess the need for suctioning and aspirate as needed  - Assess and instruct to report SOB or any respiratory difficulty  - Respiratory Therapy support as indicated  Outcome: Progressing

## 2022-04-15 VITALS
HEIGHT: 73 IN | HEART RATE: 82 BPM | BODY MASS INDEX: 29.57 KG/M2 | TEMPERATURE: 98.7 F | DIASTOLIC BLOOD PRESSURE: 66 MMHG | WEIGHT: 223.11 LBS | OXYGEN SATURATION: 96 % | RESPIRATION RATE: 19 BRPM | SYSTOLIC BLOOD PRESSURE: 136 MMHG

## 2022-04-15 LAB
GLUCOSE SERPL-MCNC: 101 MG/DL (ref 65–140)
GLUCOSE SERPL-MCNC: 124 MG/DL (ref 65–140)
GLUCOSE SERPL-MCNC: 124 MG/DL (ref 65–140)
L PNEUMO1 AG UR QL IA.RAPID: NEGATIVE

## 2022-04-15 PROCEDURE — 82948 REAGENT STRIP/BLOOD GLUCOSE: CPT

## 2022-04-15 PROCEDURE — 99239 HOSP IP/OBS DSCHRG MGMT >30: CPT | Performed by: INTERNAL MEDICINE

## 2022-04-15 RX ORDER — CEFDINIR 300 MG/1
300 CAPSULE ORAL EVERY 12 HOURS SCHEDULED
Status: DISCONTINUED | OUTPATIENT
Start: 2022-04-15 | End: 2022-04-15 | Stop reason: HOSPADM

## 2022-04-15 RX ORDER — CEFDINIR 300 MG/1
300 CAPSULE ORAL EVERY 12 HOURS SCHEDULED
Qty: 16 CAPSULE | Refills: 0 | Status: SHIPPED | OUTPATIENT
Start: 2022-04-15 | End: 2022-04-23

## 2022-04-15 RX ADMIN — FLUTICASONE FUROATE AND VILANTEROL TRIFENATATE 1 PUFF: 200; 25 POWDER RESPIRATORY (INHALATION) at 09:11

## 2022-04-15 RX ADMIN — FONDAPARINUX SODIUM 2.5 MG: 2.5 INJECTION, SOLUTION SUBCUTANEOUS at 09:11

## 2022-04-15 RX ADMIN — AMLODIPINE BESYLATE 10 MG: 10 TABLET ORAL at 09:12

## 2022-04-15 RX ADMIN — GABAPENTIN 600 MG: 300 CAPSULE ORAL at 09:11

## 2022-04-15 RX ADMIN — CITALOPRAM HYDROBROMIDE 40 MG: 20 TABLET ORAL at 09:11

## 2022-04-15 RX ADMIN — FOLIC ACID TAB 400 MCG 800 MCG: 400 TAB at 09:11

## 2022-04-15 RX ADMIN — FENOFIBRATE 145 MG: 145 TABLET, FILM COATED ORAL at 09:27

## 2022-04-15 RX ADMIN — ASPIRIN 81 MG: 81 TABLET, COATED ORAL at 09:11

## 2022-04-15 RX ADMIN — PANTOPRAZOLE SODIUM 40 MG: 40 TABLET, DELAYED RELEASE ORAL at 06:36

## 2022-04-15 RX ADMIN — B-COMPLEX W/ C & FOLIC ACID TAB 1 TABLET: TAB at 09:11

## 2022-04-15 RX ADMIN — LOSARTAN POTASSIUM 100 MG: 50 TABLET, FILM COATED ORAL at 09:12

## 2022-04-15 RX ADMIN — GUAIFENESIN 600 MG: 600 TABLET, EXTENDED RELEASE ORAL at 09:12

## 2022-04-15 RX ADMIN — PREDNISONE 5 MG: 5 TABLET ORAL at 09:12

## 2022-04-15 NOTE — DISCHARGE SUMMARY
Discharge Summary - Medical Dorita Seymour 78 y o  male MRN: [id number]    [address] [address] MED SURG Room / Bed: [address] [address] [address] 561/E5 -* Encounter: [id number]    BRIEF OVERVIEW    Admitting Provider: Emily Upton DO  Discharge Provider: Flory Harrison DO  Admission Date: 4/12/2022     Discharge Date: No discharge date for patient encounter    Primary Care Physician at Discharge: Fletcher Monge [phone]    Primary Discharge Diagnosis    Multi focal pneumonia    Other Problems Addressed  Patient Active Problem List    Diagnosis Date Noted    Multifocal pneumonia 04/12/2022    Depression, recurrent ([address] ) 03/15/2021    Chronic obstructive pulmonary disease ([address] ) 08/11/2020    Age related osteoporosis 11/27/2019    Autoimmune hemolytic anemia [facility] ) 06/19/2019    Right upper quadrant abdominal pain 06/19/2019    Hypogammaglobulinemia, acquired ([address] ) 04/10/2019    Acute bursitis of right shoulder 11/16/2018    Esophageal dysphagia 10/11/2018    Esophageal stricture 06/12/2018    Dysphagia 06/12/2018    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[address] ) 06/10/2018    Chronic right shoulder pain 05/08/2018    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[address] ) 05/02/2018    Diabetes mellitus ([facility] ) 04/27/2018    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    Colitis, acute 04/24/2018    Gastroesophageal reflux disease without esophagitis 01/31/2018    Headache around the eyes 10/30/2017    Pancytopenia ([address] ) 10/28/2017    Cellulitis of head or scalp 10/08/2017    Leukopenia due to antineoplastic chemotherapy ([facility] ) 09/18/2017    Hyperglycemia 09/18/2017    Ulcer of esophagus without bleeding 09/11/2017    Chronic kidney disease 08/21/2017    Anemia, chronic disease 03/10/2017    Hemolytic anemia ([address] ) 03/06/2017    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[facility] ) 03/06/2017    H/O blood clots     Candida esophagitis (HCC) 01/18/2017    CLL (chronic lymphocytic leukemia) (HCC)     Hyperlipidemia     Hypertension  History of TIA (transient ischemic attack)     Esophagitis, reflux 04/16/2014    Thrombocytopenia ([facility] ) 05/01/2013    [facility] lymphocytic leukemia [facility] ) 04/18/2013    CAD (coronary artery disease) 01/01/2004         Discharge Disposition: home    Allergies  Allergies   Allergen Reactions    Heparin Other (See Comments)     Other reaction(s): Blood Disorders  clot    Macrolides And Ketolides Other (See Comments)     Interacts with other meds he is taking    Simvastatin Myalgia     Diet restrictions:  Low-salt diabetic diet  Activity restrictions:  None  Discharge Condition:  Judah Prater in 1 week  Follow up with consulting providers  Pulmonology Dr Nash Ortiz recommend chest x-ray in 4 to 6 weeks     [facility]      Presenting Problem/History of Present Illness  Multifocal pneumonia  Hospital Course    69-year-old presents with fevers and chills with shortness of breath and cough    Multi focal pneumonia   CT of chest suggest multifocal right upper/lower lobes pneumonia, he was initiated on Rocephin, pneumonia antigen negative, with benefit transition to ANA LAURA FRANCO on discharge to complete a 10 day course  Day of discharge stable on room air, resolving cough and shortness of breath  Recommend chest x-ray in 4 to 6 weeks either through PCP or Pulmonary to monitor resolution of lung opacities    CLL     continue outpatient infusions through Oncology    COPD     no exacerbation continue Pulmonary follow-up    Diabetes    control with Lantus and ADA diet      Discharge  Statement   I spent 35 minutes discharging the patient  This time was spent on the day of discharge  I had direct contact with the patient on the day of discharge  Additional documentation is required if more than 30 minutes were spent on discharge     Discussed discharge and follow-up stress repeat chest x-ray 4 to 6 weeks    Discharge instructions/Information to patient and family:   See after visit summary for information provided to patient and family

## 2022-04-15 NOTE — PLAN OF CARE
Problem: PAIN - ADULT  Goal: Verbalizes/displays adequate comfort level or baseline comfort level  Description: Interventions:  - Encourage patient to monitor pain and request assistance  - Assess pain using appropriate pain scale  - Administer analgesics based on type and severity of pain and evaluate response  - Implement non-pharmacological measures as appropriate and evaluate response  - Consider cultural and social influences on pain and pain management  - Notify physician/advanced practitioner if interventions unsuccessful or patient reports new pain  Outcome: Progressing     Problem: INFECTION - ADULT  Goal: Absence or prevention of progression during hospitalization  Description: INTERVENTIONS:  - Assess and monitor for signs and symptoms of infection  - Monitor lab/diagnostic results  - Monitor all insertion sites, i e  indwelling lines, tubes, and drains  - Monitor endotracheal if appropriate and nasal secretions for changes in amount and color  - [place] appropriate cooling/warming therapies per order  - Administer medications as ordered  - Instruct and encourage patient and family to use good hand hygiene technique  - Identify and instruct in appropriate isolation precautions for identified infection/condition  Outcome: Progressing  Goal: Absence of fever/infection during neutropenic period  Description: INTERVENTIONS:  - Monitor WBC    Outcome: Progressing     Problem: DISCHARGE PLANNING  Goal: Discharge to home or other facility with appropriate resources  Description: INTERVENTIONS:  - Identify barriers to discharge w/patient and caregiver  - Arrange for needed discharge resources and transportation as appropriate  - Identify discharge learning needs (meds, wound care, etc )  - Arrange for interpretive services to assist at discharge as needed  - Refer to Case Management Department for coordinating discharge planning if the patient needs post-hospital services based on physician/advanced practitioner order or complex needs related to functional status, cognitive ability, or social support system  Outcome: Progressing     Problem: Knowledge Deficit  Goal: Patient/family/caregiver demonstrates understanding of disease process, treatment plan, medications, and discharge instructions  Description: Complete learning assessment and assess knowledge base  Interventions:  - Provide teaching at level of understanding  - Provide teaching via preferred learning methods  Outcome: Progressing     Problem: Nutrition/Hydration-ADULT  Goal: Nutrient/Hydration intake appropriate for improving, restoring or maintaining nutritional needs  Description: Monitor and assess patient's nutrition/hydration status for malnutrition  Collaborate with interdisciplinary team and initiate plan and interventions as ordered  Monitor patient's weight and dietary intake as ordered or per policy  Utilize nutrition screening tool and intervene as necessary  Determine patient's food preferences and provide high-protein, high-caloric foods as appropriate       INTERVENTIONS:  - Monitor oral intake, urinary output, labs, and treatment plans  - Assess nutrition and hydration status and recommend course of action  - Evaluate amount of meals eaten  - Assist patient with eating if necessary   - Allow adequate time for meals  - Recommend/ encourage appropriate diets, oral nutritional supplements, and vitamin/mineral supplements  - Order, calculate, and assess calorie counts as needed  - Recommend, monitor, and adjust tube feedings and TPN/PPN based on assessed needs  - Assess need for intravenous fluids  - Provide specific nutrition/hydration education as appropriate  - Include patient/family/caregiver in decisions related to nutrition  Outcome: Progressing     Problem: RESPIRATORY - ADULT  Goal: Achieves optimal ventilation and oxygenation  Description: INTERVENTIONS:  - Assess for changes in respiratory status  - Assess for changes in mentation and behavior  - Position to facilitate oxygenation and minimize respiratory effort  - Oxygen administered by appropriate delivery if ordered  - Initiate smoking cessation education as indicated  - Encourage broncho-pulmonary hygiene including cough, deep breathe, Incentive Spirometry  - Assess the need for suctioning and aspirate as needed  - Assess and instruct to report SOB or any respiratory difficulty  - Respiratory Therapy support as indicated  Outcome: Progressing

## 2022-04-15 NOTE — CASE MANAGEMENT
Case Management Assessment & Discharge Planning Note    Patient name Marlon Naval  Location [address] [address] [address] 561/E5 11 [facility]-* MRN [id number]  : 1955 Date 4/15/2022       Current Admission Date: 2022  Current Admission Diagnosis:Multifocal pneumonia   Patient Active Problem List    Diagnosis Date Noted    Multifocal pneumonia 2022    Depression, recurrent ([facility] ) 03/15/2021    [facility] disease [facility] 2020    Age related osteoporosis 2019    Autoimmune hemolytic anemia ([facility] ) 2019    Right upper quadrant abdominal pain 2019    Hypogammaglobulinemia, acquired ([facility] ) 04/10/2019    Acute bursitis of right shoulder 2018    Esophageal dysphagia 10/11/2018    Esophageal stricture 2018    Dysphagia 2018    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[facility] ) 06/10/2018    Chronic right shoulder pain 2018    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[facility] ) 2018    Diabetes mellitus ([facility] ) 2018    Listeria bacteremia 2018    Colitis, acute 2018    Gastroesophageal reflux disease without esophagitis 2018    Headache around the eyes 10/30/2017    Pancytopenia ([facility] ) 10/28/2017    Cellulitis of head or scalp 10/08/2017    Leukopenia due to antineoplastic chemotherapy ([facility] ) 2017    Hyperglycemia 2017    Ulcer of esophagus without bleeding 2017    Chronic kidney disease 2017    Anemia, chronic disease 03/10/2017    Hemolytic anemia ([facility] ) 2017    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[facility] ) 2017    H/O blood clots     Candida esophagitis (HCC) 2017    CLL (chronic lymphocytic leukemia) ([facility] )     Hyperlipidemia     Hypertension     History of TIA (transient ischemic attack)     Esophagitis, reflux 2014    Thrombocytopenia ([facility] ) 2013    Chronic lymphocytic leukemia ([facility] ) 2013    CAD (coronary artery disease) 2004      LOS (days): 3  Geometric Mean LOS (GMLOS) (days): 3 10  Days to GMLOS:0 2     OBJECTIVE:    Risk of Unplanned Readmission Score: 18         Current admission status: Inpatient  Referral Reason:  (Discharge Planning)    Preferred Pharmacy:   [id number] - [address]   [facility] [address]  Phone: [phone] Fax: [phone] [address], [place] [facility] [address]  Phone: [phone] Fax: [phone]    Primary Care Provider: Mandy Boyd DO    Primary Insurance: MEDICARE  Secondary Insurance: BLUE CROSS    ASSESSMENT:  [address] 26 Proxies    There are no active Health Care Proxies on file         Readmission Root Cause  30 Day Readmission: No    Patient Information  Admitted from[de-identified] Home  Mental Status: Alert  During Assessment patient was accompanied by: Spouse  Assessment information provided by[de-identified] Spouse  Primary Caregiver: Self  Support Systems: [employer] of Residence: [place] do you live in?: [place]  Type of Current Residence: [address]  In the last 12 months, was there a time when you were not able to pay the mortgage or rent on time?: No  In the last 12 months, how many places have you lived?: 1  In the last 12 months, was there a time when you did not have a steady place to sleep or slept in a shelter (including now)?: No  Homeless/housing insecurity resource given?: N/A  Living Arrangements: Lives w/ Spouse/significant other    Activities of Daily Living Prior to Admission  Functional Status: Independent  Completes ADLs independently?: Yes  Ambulates independently?: Yes  Does patient use assisted devices?: No  Does patient currently own DME?: No  Does patient have a history of HHC?: No  Does patient currently have [address]?: No    Patient Information Continued  Income Source: Pension/longterm  Does patient have prescription coverage?: Yes  Within the past 12 months, you worried that your food would run out before you got the money to buy more : Never true  Within the past 12 months, the food you bought just didnt last and you didnt have money to get more : Never true  Food insecurity resource given?: N/A  Does patient receive dialysis treatments?: No  Does patient have a history of substance abuse?: No  Does patient have a history of Mental Health Diagnosis?: No     Means of Transportation  Means of Transport to Appts[de-identified] Drives Self  In the past 12 months, has lack of transportation kept you from medical appointments or from getting medications?: No  In the past 12 months, has lack of transportation kept you from meetings, work, or from getting things needed for daily living?: No  Was application for public transport provided?: N/A        DISCHARGE DETAILS:    Discharge planning discussed with[de-identified] Anh Hess (Spouse)  [phone] (M)  Freedom of Choice: Yes  Comments - Freedom of Choice: As per spouse, the expectation is for Pt to discharge to home and resume plof  CM contacted family/caregiver?: Yes  Were Treatment Team discharge recommendations reviewed with patient/caregiver?: Yes  Did patient/caregiver verbalize understanding of patient care needs?: Yes  Were patient/caregiver advised of the risks associated with not following Treatment Team discharge recommendations?: Yes    Contacts  Patient Contacts: Anh Hess (Spouse) [phone] Sofie Obrien)  Relationship to Patient[de-identified] Family  Contact Method: Phone  Phone Number: Anh Hess (Spouse) [phone] Sofie Obrien)  Reason/Outcome: Continuity of [address] [place] [address]         Is the patient interested in [facility] at discharge?: No  DME Referral Provided  Referral made for DME?: No    Treatment Team Recommendation: Home  Discharge Destination Plan[de-identified] Home  Transport at Discharge : [place]

## 2022-04-15 NOTE — PROGRESS NOTES
Progress Note - Alethea Gonzalez 78 y o  male MRN: [id number]    Unit/Bed#: E5 -01 Encounter: [id number]    Assessment/Plan:    Multifocal pneumonia   improving with Rocephin, will transition to Omnicef to complete a 10 day course of antibiotics    COPD     no exacerbation stable on room air    Diabetes    control with Lantus and ADA diet    CLL     continue oncology follow-up    HIT history    platelets stable 264    Hypertension    control with Norvasc and losartan    Subjective:   Feels much better less shortness of breath, much less cough, no fevers chills no chest pain no nausea vomiting appetite good      Objective:     Vitals: Blood pressure 136/66, pulse 82, temperature 98 7 °F (37 1 °C), resp  rate 19, height 6' 1" (1 854 m), weight 101 kg (223 lb 1 7 oz), SpO2 96 %  ,Body mass index is 29 44 kg/m²  Results from last 7 days   Lab Units 04/14/22  0451 04/13/22  0640 04/12/22  1450   WBC Thousand/uL 4 32   < > 5 81   HEMOGLOBIN g/dL 13 7   < > 13 5   HEMATOCRIT % 42 9   < > 41 3   PLATELETS Thousands/uL 156   < > 143*   INR   --   --  1 14    < > = values in this interval not displayed  Results from last 7 days   Lab Units 04/14/22  0451 04/13/22  0640 04/12/22  1450   POTASSIUM mmol/L 3 6   < > 3 8   CHLORIDE mmol/L 106   < > 105   CO2 mmol/L 24   < > 26   BUN mg/dL 19   < > 20   CREATININE mg/dL 1 04   < > 1 27   CALCIUM mg/dL 8 9   < > 8 6   ALK PHOS U/L  --   --  54   ALT U/L  --   --  29   AST U/L  --   --  20    < > = values in this interval not displayed         Scheduled Meds:  Current Facility-Administered Medications   Medication Dose Route Frequency Provider Last Rate    amLODIPine  10 mg Oral Daily PATRICK Mares-FREDDY      aspirin  81 mg Oral Daily Sherriesa Palanabell, PA-C      cefdinir  300 mg Oral Q12H [address] Cristela Rubinstein, [place]      citalopram  40 mg Oral Daily Reesa Palanabell, PA-C      fenofibrate  145 mg Oral Daily Jt Palanabell PA-C      fluticasone-vilanterol  1 puff Inhalation Daily Rexine Arlington, PA-C      folic acid  411 mcg Oral Daily Rexine Jamie, PA-C      gabapentin  600 mg Oral Daily Rexine Jamie, PA-C      guaiFENesin  600 mg Oral Q12H [address] Rexine Arlington, [place]      insulin glargine  20 Units Subcutaneous HS Amadeo Dunne DO      insulin lispro  1-6 Units Subcutaneous HS Rexine Arlington, PA-C      insulin lispro  2-12 Units Subcutaneous TID AC Rexine Jamie, PA-C      losartan  100 mg Oral Daily Rexine Arlington, PA-C      multivitamin stress formula  1 tablet Oral Daily Rexine Arlington, PA-C      pantoprazole  40 mg Oral Daily Before Breakfast Rexine Jamie, PA-C      predniSONE  5 mg Oral Daily Rexine Arlington, PA-C         Continuous Infusions:         Physical exam:  General appearance:  Alert no distress interaction appropriate  Head/Eyes:  Nonicteric PERRL EOMI  Neck:  Supple  Lungs: CTA bilateral no wheezing rhonchi or rales  Heart: normal S1 S2 regular  Abdomen: Soft nontender with bowel sounds  Extremities: no edema  Skin: no rash    Invasive Devices  Report    Central Venous Catheter Line            Port A Cath 01/01/10 Right Chest 4487 days

## 2022-04-15 NOTE — PLAN OF CARE
Problem: PAIN - ADULT  Goal: Verbalizes/displays adequate comfort level or baseline comfort level  Description: Interventions:  - Encourage patient to monitor pain and request assistance  - Assess pain using appropriate pain scale  - Administer analgesics based on type and severity of pain and evaluate response  - Implement non-pharmacological measures as appropriate and evaluate response  - Consider cultural and social influences on pain and pain management  - Notify physician/advanced practitioner if interventions unsuccessful or patient reports new pain  4/14/2022 2310 by Karoline Lilly RN  Outcome: Progressing  4/14/2022 2310 by Karoline Lilly RN  Outcome: Progressing     Problem: INFECTION - ADULT  Goal: Absence or prevention of progression during hospitalization  Description: INTERVENTIONS:  - Assess and monitor for signs and symptoms of infection  - Monitor lab/diagnostic results  - Monitor all insertion sites, i e  indwelling lines, tubes, and drains  - Monitor endotracheal if appropriate and nasal secretions for changes in amount and color  - [place] appropriate cooling/warming therapies per order  - Administer medications as ordered  - Instruct and encourage patient and family to use good hand hygiene technique  - Identify and instruct in appropriate isolation precautions for identified infection/condition  4/14/2022 2310 by Karoline Lilly RN  Outcome: Progressing  4/14/2022 2310 by Karoline Lilly RN  Outcome: Progressing  Goal: Absence of fever/infection during neutropenic period  Description: INTERVENTIONS:  - Monitor WBC    4/14/2022 2310 by Karoline Lilly RN  Outcome: Progressing  4/14/2022 2310 by Karoline Lilly RN  Outcome: Progressing     Problem: DISCHARGE PLANNING  Goal: Discharge to home or other facility with appropriate resources  Description: INTERVENTIONS:  - Identify barriers to discharge w/patient and caregiver  - Arrange for needed discharge resources and transportation as appropriate  - Identify discharge learning needs (meds, wound care, etc )  - Arrange for interpretive services to assist at discharge as needed  - Refer to Case Management Department for coordinating discharge planning if the patient needs post-hospital services based on physician/advanced practitioner order or complex needs related to functional status, cognitive ability, or social support system  4/14/2022 2310 by Omaira Osorio RN  Outcome: Progressing  4/14/2022 2310 by Omaira Osorio RN  Outcome: Progressing     Problem: Knowledge Deficit  Goal: Patient/family/caregiver demonstrates understanding of disease process, treatment plan, medications, and discharge instructions  Description: Complete learning assessment and assess knowledge base  Interventions:  - Provide teaching at level of understanding  - Provide teaching via preferred learning methods  4/14/2022 2310 by Omaira Osorio RN  Outcome: Progressing  4/14/2022 2310 by Omaira Osorio RN  Outcome: Progressing     Problem: Nutrition/Hydration-ADULT  Goal: Nutrient/Hydration intake appropriate for improving, restoring or maintaining nutritional needs  Description: Monitor and assess patient's nutrition/hydration status for malnutrition  Collaborate with interdisciplinary team and initiate plan and interventions as ordered  Monitor patient's weight and dietary intake as ordered or per policy  Utilize nutrition screening tool and intervene as necessary  Determine patient's food preferences and provide high-protein, high-caloric foods as appropriate       INTERVENTIONS:  - Monitor oral intake, urinary output, labs, and treatment plans  - Assess nutrition and hydration status and recommend course of action  - Evaluate amount of meals eaten  - Assist patient with eating if necessary   - Allow adequate time for meals  - Recommend/ encourage appropriate diets, oral nutritional supplements, and vitamin/mineral supplements  - Order, calculate, and assess calorie counts as needed  - Recommend, monitor, and adjust tube feedings and TPN/PPN based on assessed needs  - Assess need for intravenous fluids  - Provide specific nutrition/hydration education as appropriate  - Include patient/family/caregiver in decisions related to nutrition  4/14/2022 2310 by Ayse Arnett RN  Outcome: Progressing  4/14/2022 2310 by Ayse Arnett RN  Outcome: Progressing     Problem: RESPIRATORY - ADULT  Goal: Achieves optimal ventilation and oxygenation  Description: INTERVENTIONS:  - Assess for changes in respiratory status  - Assess for changes in mentation and behavior  - Position to facilitate oxygenation and minimize respiratory effort  - Oxygen administered by appropriate delivery if ordered  - Initiate smoking cessation education as indicated  - Encourage broncho-pulmonary hygiene including cough, deep breathe, Incentive Spirometry  - Assess the need for suctioning and aspirate as needed  - Assess and instruct to report SOB or any respiratory difficulty  - Respiratory Therapy support as indicated  4/14/2022 2310 by Ayse Arnett RN  Outcome: Progressing  4/14/2022 2310 by Ayse Arnett RN  Outcome: Progressing

## 2022-04-17 LAB
BACTERIA BLD CULT: NORMAL
BACTERIA BLD CULT: NORMAL

## 2022-04-18 ENCOUNTER — TRANSITIONAL CARE MANAGEMENT (OUTPATIENT)
Dept: FAMILY MEDICINE CLINIC | Facility: CLINIC | Age: 67
End: 2022-04-18

## 2022-04-18 ENCOUNTER — TELEPHONE (OUTPATIENT)
Dept: HEMATOLOGY ONCOLOGY | Facility: CLINIC | Age: 67
End: 2022-04-18

## 2022-04-18 NOTE — TELEPHONE ENCOUNTER
Left msg for patient to return my call  Dr Ijeoma Marte will be holding Bertrum Rist tomorrow due to recent hospitalization / pneumonia  I will also discuss additional dose of Evusheld    Patient got 2 150mg injections - recommended dose now is 2 300mg injectins

## 2022-04-18 NOTE — TELEPHONE ENCOUNTER
CALL TRANSFER   Reason for patient call? Patient was talking to [employer], call dropped  Patient's primary oncologist? Dr Ijeoma Marte    RN call was transferred to and time it was transferred? [employer] 4:03PM 4/18   Informed patient that the message will be forwarded to the team and someone will get back to them as soon as possible    Did you relay this information to the patient?   yes

## 2022-04-19 ENCOUNTER — HOSPITAL ENCOUNTER (OUTPATIENT)
Dept: INFUSION CENTER | Facility: CLINIC | Age: 67
Discharge: HOME | End: 2022-04-19

## 2022-04-19 ENCOUNTER — TELEPHONE (OUTPATIENT)
Dept: HEMATOLOGY ONCOLOGY | Facility: CLINIC | Age: 67
End: 2022-04-19

## 2022-04-19 DIAGNOSIS — C91.10 CHRONIC LYMPHOCYTIC LEUKEMIA (HCC): Primary | ICD-10-CM

## 2022-04-19 NOTE — TELEPHONE ENCOUNTER
Frankie make up dose scheduled for 5/5/22 @ 3pm - [facility]  Patient aware and verbalized understanding    2nd 6 levi dose is already scheduled for 8/18/22

## 2022-04-20 ENCOUNTER — RA CDI HCC (OUTPATIENT)
Dept: OTHER | Facility: HOSPITAL | Age: 67
End: 2022-04-20

## 2022-04-20 NOTE — PROGRESS NOTES
[address]  coding opportunities     E11 [id number]     Chart Reviewed number of suggestions sent to Provider: 1     Patients Insurance     Medicare Insurance: [employer]

## 2022-04-21 DIAGNOSIS — J20.9 ACUTE BRONCHITIS, UNSPECIFIED ORGANISM: ICD-10-CM

## 2022-04-21 DIAGNOSIS — J44.9 CHRONIC OBSTRUCTIVE PULMONARY DISEASE, UNSPECIFIED COPD TYPE (HCC): ICD-10-CM

## 2022-04-21 RX ORDER — BENZONATATE 200 MG/1
200 CAPSULE ORAL 3 TIMES DAILY PRN
Qty: 20 CAPSULE | Refills: 0 | Status: SHIPPED | OUTPATIENT
Start: 2022-04-21

## 2022-04-25 ENCOUNTER — OFFICE VISIT (OUTPATIENT)
Dept: ENDOCRINOLOGY | Facility: CLINIC | Age: 67
End: 2022-04-25
Payer: MEDICARE

## 2022-04-25 ENCOUNTER — TELEPHONE (OUTPATIENT)
Dept: HEMATOLOGY ONCOLOGY | Facility: CLINIC | Age: 67
End: 2022-04-25

## 2022-04-25 VITALS
BODY MASS INDEX: 30.62 KG/M2 | HEIGHT: 73 IN | WEIGHT: 231 LBS | HEART RATE: 74 BPM | SYSTOLIC BLOOD PRESSURE: 148 MMHG | DIASTOLIC BLOOD PRESSURE: 80 MMHG

## 2022-04-25 DIAGNOSIS — I10 PRIMARY HYPERTENSION: ICD-10-CM

## 2022-04-25 DIAGNOSIS — T38.0X5S STEROID-INDUCED DIABETES MELLITUS, SEQUELA (HCC): Primary | ICD-10-CM

## 2022-04-25 DIAGNOSIS — E09.9 STEROID-INDUCED DIABETES MELLITUS, SEQUELA (HCC): Primary | ICD-10-CM

## 2022-04-25 DIAGNOSIS — E78.2 MIXED HYPERLIPIDEMIA: ICD-10-CM

## 2022-04-25 DIAGNOSIS — Z79.52 LONG TERM (CURRENT) USE OF SYSTEMIC STEROIDS: ICD-10-CM

## 2022-04-25 PROCEDURE — 99214 OFFICE O/P EST MOD 30 MIN: CPT | Performed by: INTERNAL MEDICINE

## 2022-04-25 PROCEDURE — 95251 CONT GLUC MNTR ANALYSIS I&R: CPT | Performed by: INTERNAL MEDICINE

## 2022-04-25 NOTE — ASSESSMENT & PLAN NOTE
Lab Results   Component Value Date    HGBA1C 7 6 (H) 02/22/2022   Post meal hyperglycemia noted with sugars dropping overnight  Post meal hyperglycemia sometimes related to missed doses of Humalog    For now  decrease tresiba to 14 units at bedtime   Continue Humalog at current dose    Patient expressed frustration as he felt sensor is not very accurate specially with hypoglycemia-he will decide if he is going to keep using the sensor versus going back on fingerstick monitoring

## 2022-04-25 NOTE — PROGRESS NOTES
Gasper Shaw 78 y o  male MRN: [id number]    Encounter: [id number]      Assessment/Plan     Problem List Items Addressed This Visit        Endocrine    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[address] ) - Primary       Lab Results   Component Value Date    HGBA1C 7 6 (H) 02/22/2022   Post meal hyperglycemia noted with sugars dropping overnight  Post meal hyperglycemia sometimes related to missed doses of Humalog  For now  decrease tresiba to 14 units at bedtime   Continue Humalog at current dose    Patient expressed frustration as he felt sensor is not very accurate specially with hypoglycemia-he will decide if he is going to keep using the sensor versus going back on fingerstick monitoring         Relevant Orders    HEMOGLOBIN A1C W/ EAG ESTIMATION Lab Collect    T4, free Lab Collect    TSH, 3rd generation Lab Collect    Comprehensive metabolic panel Lab Collect       Cardiovascular and Mediastinum    Hypertension    Relevant Orders    Comprehensive metabolic panel Lab Collect    Microalbumin / creatinine urine ratio Lab Collect       Other    Hyperlipidemia    Relevant Orders    Comprehensive metabolic panel Lab Collect    Long term (current) use of systemic steroids    Relevant Orders    DXA bone density spine hip and pelvis        CC: Diabetes    History of Present Illness     HPI:  70-year-old male with steroid-induced diabetes on insulin therapy seen in f/u   He is on prednisone 5 mg daily and receives steroids with chemo every 2 weeks   humalog the day of chemo - 10 units before chemo   Current regimen-Tresiba 17  units daily   Humalog 5 units before meals     No polyuria , polydipsia , no blurry vision , no numbness and tingling in feet   Weight  Gain a few lbs     Peter A Aniades   Device used freestyle [employer] Viv[employer] use       Indication   Steroid induced DM     More than 72 hours of data was reviewed  Report to be scanned to chart       Date Range: 4/12/22-4/25/22    Analysis of data:   Average Glucose: 164 mg/dl  Coefficient of Variation: 28 2%  Time in Target Range: 63%  Time Above Range: 37%  Time Below Range: 0%    Interpretation of data:  Post meal hyperglycemia noted specially in the evenings  Sugars lowest overnight   Recently hospitalized for multifocal pneumonia   Review of Systems    Historical Information   Past Medical History:   Diagnosis Date    Allergic     Anemia     Arthritis     CAD (coronary artery disease) 2004    Cancer ([facility] )     Leukemia    Cellulitis of scalp     CKD (chronic kidney disease) stage 3, GFR 30-59 ml/min ([facility])     CLL (chronic lymphocytic leukemia) ([place] [address] )     COPD (chronic obstructive pulmonary disease) ([facility] )     Diabetes mellitus ([place] [address] )     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    Dyslipidemia     Edema extremities     Esophageal ulcer     H/O blood clots     Hemolytic anemia (HCC)     Hiatal hernia     History of TIA (transient ischemic attack)     Hyperlipidemia     Hypertension     Listeria infection 2018    Multiple gastric ulcers     Myocardial infarction ([place] [address] ) 2004    acute    Neutropenia (HCC)     Obese     Recurrent sinusitis     Sleep apnea     Thrombocytopenia (HCC)     Vertigo      Past Surgical History:   Procedure Laterality Date    ARTHROSCOPIC REPAIR ACL      lt knee    CARDIAC SURGERY      cardiac cath with stent    FOOT SURGERY      IR PORT CHECK  5/17/2019    KNEE ARTHROSCOPY      OTHER SURGICAL HISTORY      cardiac cath lesion 1, 1st adjunct treat device: stent    PORTACATH PLACEMENT      MD ESOPHAGOGASTRODUODENOSCOPY TRANSORAL DIAGNOSTIC N/A 10/5/2017    Procedure: ESOPHAGOGASTRODUODENOSCOPY (EGD) with bx;  Surgeon: Romana Barth, DO;  Location: [facility]; Service: Gastroenterology    MD ESOPHAGOGASTRODUODENOSCOPY TRANSORAL DIAGNOSTIC N/A 3/8/2018    Procedure: ESOPHAGOGASTRODUODENOSCOPY (EGD) with biopsy;  Surgeon: Romana Barth, DO;  [facility];   Service: Gastroenterology    MD ESOPHAGOGASTRODUODENOSCOPY TRANSORAL DIAGNOSTIC N/A 2018    Procedure: ESOPHAGOGASTRODUODENOSCOPY (EGD) with Dilation;  Surgeon: Joshua Schultz DO;  [facility]; Service: Gastroenterology    RI ESOPHAGOGASTRODUODENOSCOPY TRANSORAL DIAGNOSTIC N/A 10/18/2018    Procedure: ESOPHAGOGASTRODUODENOSCOPY (EGD) with dilation;  Surgeon: Cris Claire MD;  [facility];   Service: Gastroenterology    RI ESOPHAGOSCOPY FLEXIBLE TRANSORAL WITH BIOPSY N/A 2016    Procedure: ESOPHAGOGASTRODUODENOSCOPY (EGD); ESOPHAGEAL DILATION; ESOPHAGEAL BIOPSY;  Surgeon: Slime Hermosillo MD;  Location:  MAIN OR;  Service: Thoracic    TONSILLECTOMY      UPPER GASTROINTESTINAL ENDOSCOPY      x 6     Social History   Social History     Substance and Sexual Activity   Alcohol Use Yes    Alcohol/week: 2 0 standard drinks    Types: 2 Cans of beer per week    Comment: social use as per [employer]     Social History     Substance and Sexual Activity   Drug Use No     Social History     Tobacco Use   Smoking Status Former Smoker    Packs/day: 2 00    Years: 33 00    Pack years: 66 00    Types: Cigarettes    Start date:     Quit date:     Years since quittin 3   Smokeless Tobacco Never Used     Family History:   Family History   Problem Relation Age of Onset    Leukemia Mother         chronic    Colon polyps Mother         sidmoid    Parkinsonism Father        Meds/Allergies   Current Outpatient Medications   Medication Sig Dispense Refill    acyclovir (ZOVIRAX) 400 MG tablet TAKE 1 TABLET TWICE A DAY 60 tablet 11    albuterol (2 5 mg/3 mL) 0 083 % nebulizer solution Take 3 mL (2 5 mg total) by nebulization every 6 (six) hours as needed for wheezing or shortness of breath 180 mL 5    amLODIPine (NORVASC) 10 mg tablet TAKE 1 TABLET DAILY 90 tablet 5    aspirin 81 MG tablet Take 81 mg by mouth every other day Every other day       benzonatate (TESSALON) 200 MG capsule Take 1 capsule (200 mg total) by mouth 3 (three) times a day as needed for cough 20 capsule 0    citalopram (CeleXA) 40 mg tablet TAKE 1 TABLET DAILY 90 tablet 5    fenofibrate (TRICOR) 145 mg tablet TAKE 1 TABLET DAILY 90 tablet 5    folic acid (FOLVITE) 1 mg tablet Take 800 mcg by mouth daily       gabapentin (NEURONTIN) 600 MG tablet Take 1 tablet (600 mg total) by mouth daily 90 tablet 6    glucose monitoring kit (FREESTYLE) monitoring kit 1 each by Does not apply route as needed ( ) E 11 9 1 each 0    guaifenesin-codeine (GUAIFENESIN AC) 100-10 MG/5ML liquid Take 5 mL by mouth 3 (three) times a day as needed for cough 236 mL 0    Ibrutinib 140 MG TABS Take 140 mg by mouth daily 30 tablet 6    insulin degludec (Tresiba FlexTouch) 100 units/mL injection pen Inject 15 Units under the skin daily at bedtime (Patient taking differently: Inject 17 Units under the skin daily at bedtime  ) 15 mL 1    insulin lispro (HumaLOG) 100 units/mL injection pen Use 5-10 units before meals as needed for steroid induced hyperglycemia (Patient taking differently: Use 5-17 units before meals as needed for steroid induced hyperglycemia ) 15 mL 0    Insulin Pen Needle (BD Pen Needle Inez U/F) 32G X 4 MM MISC Use 3 (three) times a day 300 each 1    Insulin Syringe-Needle U-100 30G X 1/2" 0 3 ML MISC by Does not apply route 2 (two) times a day 100 each 6    Lancets (FREESTYLE) lancets Use as instructed--test 2 times a day    E 11 9 100 each 0    LORazepam (ATIVAN) 0 5 mg tablet Take 1 tablet (0 5 mg total) by mouth daily as needed for anxiety 30 tablet 0    losartan (COZAAR) 100 MG tablet TAKE 1 TABLET DAILY 90 tablet 5    mometasone-formoterol (Dulera) 200-5 MCG/ACT inhaler Inhale 2 puffs 2 (two) times a day Rinse mouth after use  13 g 1    multivitamin (THERAGRAN) TABS Take 1 tablet by mouth daily      naloxone (NARCAN) 4 mg/0 1 mL nasal spray Administer 1 spray into a nostril  If no response after 2-3 minutes, give another dose in the other nostril using a new spray   1 each 1    obinutuzumab (GAZYVA) 1000 mg/40 mL SOLN Infuse into a venous catheter      oxyCODONE (ROXICODONE) 10 MG TABS Take 1 tablet (10 mg total) by mouth every 6 (six) hours as needed for moderate pain For ongoing pain controlMax Daily Amount: 40 mg 90 tablet 0    pantoprazole (PROTONIX) 40 mg tablet TAKE 1 TABLET DAILY 90 tablet 5    Potassium (POTASSIMIN PO) Take 20 mEq by mouth daily        predniSONE 5 mg tablet TAKE 1 TABLET DAILY 90 tablet 5    sodium chloride, PF, 0 9 % 10 mL by Intracatheter route see administration instructions 10mL into port before and after ampicillin doses  0    zolpidem (AMBIEN) 5 mg tablet Take 1 tablet (5 mg total) by mouth daily at bedtime as needed for sleep 30 tablet 0    nystatin (MYCOSTATIN) 500,000 units/5 mL suspension Apply 2 mL (200,000 Units total) to the mouth or throat 4 (four) times a day (Patient not taking: Reported on 4/25/2022 ) 473 mL 0     No current facility-administered medications for this visit  Allergies   Allergen Reactions    Heparin Other (See Comments)     Other reaction(s): Blood Disorders  clot    Macrolides And Ketolides Other (See Comments)     Interacts with other meds he is taking    Simvastatin Myalgia       Objective   Vitals: Blood pressure 148/80, pulse 74, height 6' 1" (1 854 m), weight 105 kg (231 lb)  Physical Exam  Vitals reviewed  Constitutional:       Appearance: Normal appearance  He is obese  He is not ill-appearing or diaphoretic  HENT:      Head: Normocephalic and atraumatic  Eyes:      General: No scleral icterus  Extraocular Movements: Extraocular movements intact  Cardiovascular:      Rate and Rhythm: Normal rate and regular rhythm  Heart sounds: Normal heart sounds  No murmur heard  Pulmonary:      Effort: Pulmonary effort is normal  No respiratory distress  Breath sounds: Normal breath sounds  No wheezing  Abdominal:      General: There is no distension  Palpations: Abdomen is soft  Tenderness: There is no abdominal tenderness  There is no guarding  Musculoskeletal:      Cervical back: Neck supple  Right lower leg: No edema  Left lower leg: No edema  Lymphadenopathy:      Cervical: No cervical adenopathy  Skin:     General: Skin is warm and dry  Neurological:      General: No focal deficit present  Mental Status: He is alert and oriented to person, place, and time  Psychiatric:         Mood and Affect: Mood normal          Behavior: Behavior normal          Thought Content: Thought content normal          Judgment: Judgment normal          The history was obtained from the review of the chart, patient      Lab Results:   Lab Results   Component Value Date/Time    Hemoglobin A1C 7 6 (H) 02/22/2022 08:31 AM    Hemoglobin A1C 7 4 (H) 11/30/2021 08:35 AM    WBC 4 32 04/14/2022 04:51 AM    WBC 5 36 04/13/2022 06:40 AM    WBC 5 81 04/12/2022 02:50 PM    Hemoglobin 13 7 04/14/2022 04:51 AM    Hemoglobin 13 9 04/13/2022 06:40 AM    Hemoglobin 13 5 04/12/2022 02:50 PM    Hematocrit 42 9 04/14/2022 04:51 AM    Hematocrit 42 7 04/13/2022 06:40 AM    Hematocrit 41 3 04/12/2022 02:50 PM    MCV 93 04/14/2022 04:51 AM    MCV 95 04/13/2022 06:40 AM    MCV 93 04/12/2022 02:50 PM    Platelets 478 84/85/2307 04:51 AM    Platelets 613 52/56/8853 06:40 AM    Platelets 771 (L) 33/48/9156 02:50 PM    BUN 19 04/14/2022 04:51 AM    BUN 16 04/13/2022 06:40 AM    BUN 20 04/12/2022 02:50 PM    Potassium 3 6 04/14/2022 04:51 AM    Potassium 3 7 04/13/2022 06:40 AM    Potassium 3 8 04/12/2022 02:50 PM    Chloride 106 04/14/2022 04:51 AM    Chloride 105 04/13/2022 06:40 AM    Chloride 105 04/12/2022 02:50 PM    CO2 24 04/14/2022 04:51 AM    CO2 26 04/13/2022 06:40 AM    CO2 26 04/12/2022 02:50 PM    Creatinine 1 04 04/14/2022 04:51 AM    Creatinine 1 08 04/13/2022 06:40 AM    Creatinine 1 27 04/12/2022 02:50 PM    AST 20 04/12/2022 02:50 PM    AST 27 03/22/2022 08:22 AM    AST 30 02/22/2022 08:31 AM ALT 29 04/12/2022 02:50 PM    ALT 38 03/22/2022 08:22 AM    ALT 48 02/22/2022 08:31 AM    Albumin 3 2 (L) 04/12/2022 02:50 PM    Albumin 3 8 03/22/2022 08:22 AM    Albumin 3 8 02/22/2022 08:31 AM    HDL, Direct 27 (L) 11/30/2021 08:35 AM    Triglycerides 147 11/30/2021 08:35 AM         Portions of the record may have been created with voice recognition software  Occasional wrong word or "sound a like" substitutions may have occurred due to the inherent limitations of voice recognition software  Read the chart carefully and recognize, using context, where substitutions have occurred

## 2022-04-25 NOTE — TELEPHONE ENCOUNTER
----- Message from Nuha Trejo RN sent at 4/25/2022  9:54 AM EDT -----  Regarding: FW: Infusion    ----- Message -----  From: Dorita Seymour  Sent: 4/25/2022   9:52 AM EDT  To: [facility]  Subject: Infusion                                         Hello   i dont see my infusion appointment  on tuesday   is there going on that i dont know?

## 2022-04-25 NOTE — TELEPHONE ENCOUNTER
Reviewed with patient that Dr Tiffany Echeverria canceled Luann Lies scheduled due to recent hospitalization    His next scheduled infusion appointment is scheduled for 5/3/22  Patient was agreeable and verbalized understanding

## 2022-04-25 NOTE — PATIENT INSTRUCTIONS
Hypoglycemia in a Person with Diabetes   WHAT YOU NEED TO KNOW:   Hypoglycemia is a serious condition that happens when your blood glucose (sugar) level drops too low  The blood sugar level is usually too high in a person with diabetes, but the level can also drop too low  It is important to follow your diabetes management plan to keep your blood sugar level steady  DISCHARGE INSTRUCTIONS:   Have someone call your local emergency number (911 in the [address] [facility]) if:   · You have a seizure or pass out  · Your blood sugar is less than 50 mg/dL and does not respond to treatment  · You feel you are going to pass out  · You have trouble thinking clearly  Call your doctor or diabetes care team if:   · You have had symptoms of low blood sugar several times  · You have questions about the amount of insulin or diabetes medicine you are taking  · You have questions or concerns about your condition or care  Medicines:   · Insulin or diabetes medicine  help to keep your blood sugar under control  · Glucagon  may be needed if you have severe hypoglycemia  · Take your medicine as directed  Contact your healthcare provider if you think your medicine is not helping or if you have side effects  Tell him or her if you are allergic to any medicine  Keep a list of the medicines, vitamins, and herbs you take  Include the amounts, and when and why you take them  Bring the list or the pill bottles to follow-up visits  Carry your medicine list with you in case of an emergency  Manage hypoglycemia:   · Check your blood sugar level right away if you have symptoms of hypoglycemia  Hypoglycemia usually happens when your blood sugar level is 70 mg/dL or below  Ask your diabetes care team what blood sugar level is too low for you  · If your blood sugar level is too low, eat or drink 15 grams of fast-acting carbohydrate    Examples of this amount of fast-acting carbohydrate are 4 ounces (½ cup) of fruit juice or 4 ounces of regular soda  Other examples are 2 tablespoons of raisins or 1 tube of glucose gel  Check your blood sugar level 15 minutes later  Sit still as you wait  If the level is still low (less than 100 mg/dL), have another 15 grams of carbohydrate  When the level returns to 100 mg/dL, eat a meal if it is time  If your meal time is more than 1 hour away, eat a snack  The snack should contain carbohydrates, such as the following:    ? 3/4 cup of cereal    ? 1 cup of skim or low fat milk    ? 6 soda crackers    ? 1/2 of a turkey sandwich    ? 15 fat-free chips  This will help prevent another drop in blood sugar  Always carefully follow your diabetes care team's instructions on how to treat low blood sugar levels  · Always carry a source of fast-acting carbohydrate  If you have symptoms of hypoglycemia and you do not have a blood glucose meter, have a source of fast-acting carbohydrate anyway  Avoid carbohydrate foods that are high in fat  The fat content may make the carbohydrate take longer to increase your blood sugar level  Ask your diabetes care team if you should carry a glucagon kit  Glucagon is a medicine that is injected when you develop severe hypoglycemia and become unconscious  Check the expiration date every month and replace it before it expires  · Teach others how to help you if you have symptoms of hypoglycemia  Tell them about the symptoms of hypoglycemia  Ask them to give you a source of fast-acting carbohydrate if you cannot get it yourself  Ask them to give you a glucagon injection if you have signs of hypoglycemia and you become unconscious or have a seizure  Ask them to call the local emergency number (911 in the [address] [facility])   This is an emergency  Tell them never to try to make you swallow anything if you faint or have a seizure  · Wear medical alert jewelry  or carry a card that says you have diabetes  Ask where to get these items         Prevent hypoglycemia:   · Take diabetes medicine as directed  Take your medicine at the right time and in the right amount  Do not  double the amount of medicine you take unless instructed by your diabetes care team  Julia Fisher may need oral diabetes medicine, insulin, or both to help control your blood sugar levels  Your healthcare provider will teach you how and when to take oral diabetes medicine  You will also be taught about side effects oral diabetes medicine can cause  Insulin may be added if oral diabetes medicine becomes less effective over time  Insulin may be injected, or given through a pump or pen  You and your care team will discuss which method is best for you  ? An insulin pump  is an implanted device that gives your insulin 24 hours a day  An insulin pump prevents the need for multiple insulin injections in a day  ? An insulin pen  is a device prefilled with the right amount of insulin  · Eat meals and snacks as directed  Talk to your dietitian or diabetes care team about a meal plan that is right for you  Do not skip meals  · Check your blood sugar level as directed  Ask your diabetes care team what your blood sugar levels should be before and after you eat  Ask when and how often to check your blood sugar level  You may need to check a drop of blood in a glucose test machine  You may need to check at least 3 times each day  Record your blood sugar level results and take the record with you when you see your care team  They may use it to make changes to your medicine, food, or exercise schedules  Your care team provider may recommend a continuous glucose monitor (CGM)  A CGM is a device that is worn at all times  The CGM checks your blood sugar every 5 minutes  It sends results to an electronic device such as a smart phone  · Check your blood sugar level before you exercise  Physical activity, such as exercise, can decrease your blood sugar level   If your blood sugar level is less than 100 mg/dL, have a carbohydrate snack  Examples are 4 to 6 crackers, ½ banana, 8 ounces (1 cup) of nonfat or 1% milk, or 4 ounces (½ cup) of juice  If you will be active for more than 1 hour, you may need to check your blood sugar level every 30 minutes  Your diabetes care team may also recommend that you check your blood sugar level after your activity  · Know the risks if you choose to drink alcohol  Alcohol can cause your blood sugar levels to be low if you use insulin  Alcohol can cause high blood sugar levels and weight gain if you drink too much  Women 21 years or older and men 72 years or older should limit alcohol to 1 drink a day  Men aged 24 to 59 years should limit alcohol to 2 drinks a day  A drink of alcohol is 12 ounces of beer, 5 ounces of wine, or 1½ ounces of liquor  Follow up with your doctor or diabetes care team or specialist as directed: You may need your insulin or diabetes medicine changed if you continue to have hypoglycemia episodes  Write down your questions so you remember to ask them during your visits  © Copyright [employer] 2022 Information is for End User's use only and may not be sold, redistributed or otherwise used for commercial purposes  All illustrations and images included in CareNotes® are the copyrighted property of [employer]  or Adriana Douglass  The above information is an  only  It is not intended as medical advice for individual conditions or treatments  Talk to your doctor, nurse or pharmacist before following any medical regimen to see if it is safe and effective for you

## 2022-04-26 ENCOUNTER — OFFICE VISIT (OUTPATIENT)
Dept: FAMILY MEDICINE CLINIC | Facility: CLINIC | Age: 67
End: 2022-04-26
Payer: MEDICARE

## 2022-04-26 ENCOUNTER — OFFICE VISIT (OUTPATIENT)
Dept: CARDIOLOGY CLINIC | Facility: CLINIC | Age: 67
End: 2022-04-26
Payer: MEDICARE

## 2022-04-26 VITALS
SYSTOLIC BLOOD PRESSURE: 118 MMHG | BODY MASS INDEX: 29.55 KG/M2 | HEIGHT: 73 IN | OXYGEN SATURATION: 98 % | DIASTOLIC BLOOD PRESSURE: 60 MMHG | WEIGHT: 223 LBS | HEART RATE: 75 BPM

## 2022-04-26 VITALS
SYSTOLIC BLOOD PRESSURE: 110 MMHG | BODY MASS INDEX: 30.09 KG/M2 | HEART RATE: 94 BPM | RESPIRATION RATE: 19 BRPM | WEIGHT: 227 LBS | HEIGHT: 73 IN | TEMPERATURE: 99 F | DIASTOLIC BLOOD PRESSURE: 60 MMHG | OXYGEN SATURATION: 95 %

## 2022-04-26 DIAGNOSIS — E78.2 MIXED HYPERLIPIDEMIA: ICD-10-CM

## 2022-04-26 DIAGNOSIS — I10 PRIMARY HYPERTENSION: ICD-10-CM

## 2022-04-26 DIAGNOSIS — I25.10 CORONARY ARTERY DISEASE INVOLVING NATIVE CORONARY ARTERY OF NATIVE HEART WITHOUT ANGINA PECTORIS: Primary | ICD-10-CM

## 2022-04-26 DIAGNOSIS — J18.9 MULTIFOCAL PNEUMONIA: Primary | ICD-10-CM

## 2022-04-26 PROCEDURE — 99214 OFFICE O/P EST MOD 30 MIN: CPT | Performed by: INTERNAL MEDICINE

## 2022-04-26 PROCEDURE — 99495 TRANSJ CARE MGMT MOD F2F 14D: CPT | Performed by: FAMILY MEDICINE

## 2022-04-26 NOTE — PROGRESS NOTES
Assessment/Plan:   1  Multifocal pneumonia    Patient appears clinically stable today  He states that his respiratory symptoms have significantly improved  He only has a minimal cough  He has been using his inhalers regularly  At this time, he is following with Oncology as well as pulmonology regularly  He will be having a repeat CT of his chest to further evaluate resolution of his radiographic findings  At this time, continue with his current treatment  Diagnoses and all orders for this visit:    Multifocal pneumonia        Subjective:       Chief Complaint   Patient presents with    Transition of Care Management      Patient ID: Pippa Goodwin is a 78 y o  male   Presents today for a hospital follow-up  He was admitted to [facility] on April 12th and subsequently discharged on April 15  He presented secondary to the development of dizziness and shortness of breath  He was found to have multifocal pneumonia  He did have treatment with steroids as well as was placed on empiric antibiotics  This was transitioned eventually to ANA LAURA FRANCO which she continued outpatient  Since his discharge, he has been feeling well  He is scheduled to see pulmonology next month  He is also scheduled to have a repeat CT to ensure his radiographic changes of subsided  TCM Call (since 3/26/2022)     Date and time call was made  4/18/2022  9:58 AM    Hospital care reviewed  Records reviewed        Patient was hospitialized at  [facility]        Date of Admission  04/12/22    Date of discharge  04/15/22    Diagnosis  Pneumonia    Disposition  Home    Were the patients medications reviewed and updated  No    Current Symptoms  None      TCM Call (since 3/26/2022)     Post hospital issues  None    Should patient be enrolled in anticoag monitoring? No    Scheduled for follow up?   Yes    Did you obtain your prescribed medications  Yes    Do you need help managing your prescriptions or medications No    Is transportation to your appointment needed  No    I have advised the patient to call PCP with any new or worsening symptoms    Rhea Kelly MA    Living Arrangements  Spouse or Significiant other    Are you recieving any outpatient services  No    Are you recieving home care services  No    Are you using any community resources  No    Current waiver services  No    Have you fallen in the last 12 months  No    Interperter language line needed  No            Review of Systems   Constitutional: Negative for activity change, chills, fatigue and fever  HENT: Negative for congestion, ear pain, sinus pressure and sore throat  Eyes: Negative for redness, itching and visual disturbance  Respiratory: Negative for cough and shortness of breath  Cardiovascular: Negative for chest pain and palpitations  Gastrointestinal: Negative for abdominal pain, diarrhea and nausea  Endocrine: Negative for cold intolerance and heat intolerance  Genitourinary: Negative for dysuria, flank pain and frequency  Musculoskeletal: Negative for arthralgias, back pain, gait problem and myalgias  Skin: Negative for color change  Allergic/Immunologic: Negative for environmental allergies  Neurological: Negative for dizziness, numbness and headaches  Psychiatric/Behavioral: Negative for behavioral problems and sleep disturbance  The following portions of the patient's history were reviewed and updated as appropriate : past family history, past medical history, past social history and past surgical history      Current Outpatient Medications:     acyclovir (ZOVIRAX) 400 MG tablet, TAKE 1 TABLET TWICE A DAY, Disp: 60 tablet, Rfl: 11    albuterol (2 5 mg/3 mL) 0 083 % nebulizer solution, Take 3 mL (2 5 mg total) by nebulization every 6 (six) hours as needed for wheezing or shortness of breath, Disp: 180 mL, Rfl: 5    amLODIPine (NORVASC) 10 mg tablet, TAKE 1 TABLET DAILY, Disp: 90 tablet, Rfl: 5    aspirin 81 MG tablet, Take 81 mg by mouth every other day Every other day , Disp: , Rfl:     benzonatate (TESSALON) 200 MG capsule, Take 1 capsule (200 mg total) by mouth 3 (three) times a day as needed for cough, Disp: 20 capsule, Rfl: 0    citalopram (CeleXA) 40 mg tablet, TAKE 1 TABLET DAILY, Disp: 90 tablet, Rfl: 5    fenofibrate (TRICOR) 145 mg tablet, TAKE 1 TABLET DAILY, Disp: 90 tablet, Rfl: 5    folic acid (FOLVITE) 1 mg tablet, Take 800 mcg by mouth daily , Disp: , Rfl:     gabapentin (NEURONTIN) 600 MG tablet, Take 1 tablet (600 mg total) by mouth daily, Disp: 90 tablet, Rfl: 6    glucose monitoring kit (FREESTYLE) monitoring kit, 1 each by Does not apply route as needed ( ) E 11 9, Disp: 1 each, Rfl: 0    guaifenesin-codeine (GUAIFENESIN AC) 100-10 MG/5ML liquid, Take 5 mL by mouth 3 (three) times a day as needed for cough, Disp: 236 mL, Rfl: 0    Ibrutinib 140 MG TABS, Take 140 mg by mouth daily, Disp: 30 tablet, Rfl: 6    insulin degludec (Tresiba FlexTouch) 100 units/mL injection pen, Inject 15 Units under the skin daily at bedtime (Patient taking differently: Inject 17 Units under the skin daily at bedtime  ), Disp: 15 mL, Rfl: 1    insulin lispro (HumaLOG) 100 units/mL injection pen, Use 5-10 units before meals as needed for steroid induced hyperglycemia (Patient taking differently: Use 5-17 units before meals as needed for steroid induced hyperglycemia ), Disp: 15 mL, Rfl: 0    Insulin Pen Needle (BD Pen Needle Inez U/F) 32G X 4 MM MISC, Use 3 (three) times a day, Disp: 300 each, Rfl: 1    Insulin Syringe-Needle U-100 30G X 1/2" 0 3 ML MISC, by Does not apply route 2 (two) times a day, Disp: 100 each, Rfl: 6    Lancets (FREESTYLE) lancets, Use as instructed--test 2 times a day  E 11 9, Disp: 100 each, Rfl: 0    LORazepam (ATIVAN) 0 5 mg tablet, Take 1 tablet (0 5 mg total) by mouth daily as needed for anxiety, Disp: 30 tablet, Rfl: 0    losartan (COZAAR) 100 MG tablet, TAKE 1 TABLET DAILY, Disp: 90 tablet, Rfl: 5    mometasone-formoterol (Dulera) 200-5 MCG/ACT inhaler, Inhale 2 puffs 2 (two) times a day Rinse mouth after use , Disp: 13 g, Rfl: 1    multivitamin (THERAGRAN) TABS, Take 1 tablet by mouth daily, Disp: , Rfl:     naloxone (NARCAN) 4 mg/0 1 mL nasal spray, Administer 1 spray into a nostril  If no response after 2-3 minutes, give another dose in the other nostril using a new spray , Disp: 1 each, Rfl: 1    nystatin (MYCOSTATIN) 500,000 units/5 mL suspension, Apply 2 mL (200,000 Units total) to the mouth or throat 4 (four) times a day, Disp: 473 mL, Rfl: 0    obinutuzumab (GAZYVA) 1000 mg/40 mL SOLN, Infuse into a venous catheter, Disp: , Rfl:     oxyCODONE (ROXICODONE) 10 MG TABS, Take 1 tablet (10 mg total) by mouth every 6 (six) hours as needed for moderate pain For ongoing pain controlMax Daily Amount: 40 mg, Disp: 90 tablet, Rfl: 0    pantoprazole (PROTONIX) 40 mg tablet, TAKE 1 TABLET DAILY, Disp: 90 tablet, Rfl: 5    Potassium (POTASSIMIN PO), Take 20 mEq by mouth daily  , Disp: , Rfl:     predniSONE 5 mg tablet, TAKE 1 TABLET DAILY, Disp: 90 tablet, Rfl: 5    sodium chloride, PF, 0 9 %, 10 mL by Intracatheter route see administration instructions 10mL into port before and after ampicillin doses, Disp: , Rfl: 0    zolpidem (AMBIEN) 5 mg tablet, Take 1 tablet (5 mg total) by mouth daily at bedtime as needed for sleep, Disp: 30 tablet, Rfl: 0         Objective:         Vitals:    04/26/22 1407   BP: 110/60   BP Location: Left arm   Patient Position: Sitting   Cuff Size: Large   Pulse: 94   Resp: 19   Temp: 99 °F (37 2 °C)   TempSrc: Tympanic   SpO2: 95%   Weight: 103 kg (227 lb)   Height: 6' 1" (1 854 m)     Physical Exam  Vitals reviewed  Constitutional:       Appearance: He is well-developed  HENT:      Head: Normocephalic and atraumatic  Nose: Nose normal       Mouth/Throat:      Pharynx: No oropharyngeal exudate  Eyes:      General: No scleral icterus          Right eye: No discharge  Left eye: No discharge  Pupils: Pupils are equal, round, and reactive to light  Neck:      Trachea: No tracheal deviation  Cardiovascular:      Rate and Rhythm: Normal rate and regular rhythm  Pulses:           Dorsalis pedis pulses are 2+ on the right side and 2+ on the left side  Posterior tibial pulses are 2+ on the right side and 2+ on the left side  Heart sounds: Normal heart sounds  No murmur heard  No friction rub  No gallop  Pulmonary:      Effort: Pulmonary effort is normal  No respiratory distress  Breath sounds: Normal breath sounds  No wheezing or rales  Abdominal:      General: Bowel sounds are normal  There is no distension  Palpations: Abdomen is soft  Tenderness: There is no abdominal tenderness  There is no guarding or rebound  Musculoskeletal:         General: Normal range of motion  Cervical back: Normal range of motion and neck supple  Lymphadenopathy:      Head:      Right side of head: No submental or submandibular adenopathy  Left side of head: No submental or submandibular adenopathy  Cervical: No cervical adenopathy  Right cervical: No superficial, deep or posterior cervical adenopathy  Left cervical: No superficial, deep or posterior cervical adenopathy  Skin:     General: Skin is warm and dry  Findings: No erythema  Neurological:      Mental Status: He is alert and oriented to person, place, and time  Cranial Nerves: No cranial nerve deficit  Sensory: No sensory deficit  Psychiatric:         Mood and Affect: Mood is not anxious or depressed  Speech: Speech normal          Behavior: Behavior normal          Thought Content:  Thought content normal          Judgment: Judgment normal

## 2022-04-26 NOTE — PROGRESS NOTES
Assessment/Plan:    Hyperlipidemia    Hyperlipidemia, stable  The patient will continue fenofibrate at 145 mg daily  Hypertension    Hypertension, stable and adequately controlled  CAD (coronary artery disease)    Coronary artery disease, stable  No symptoms of angina or signs of heart failure  Diagnoses and all orders for this visit:    Coronary artery disease involving native coronary artery of native heart without angina pectoris    Primary hypertension    Mixed hyperlipidemia          Subjective:   Feels well from the cardiac standpoint  Patient ID: Susie Greenberg is a 78 y o  male  The patient presented to this office for the purpose of cardiac follow-up  He is known to have a history of coronary artery disease with hypertension and hyperlipidemia as well CLL  The patient was recently hospitalized with the double pneumonia and has some residual cough but he feels generally better  He denies any symptoms of chest pain, shortness of breath, palpitation dizziness or lightheadedness  He has no leg edema  The following portions of the patient's history were reviewed and updated as appropriate: allergies, current medications, past family history, past medical history, past social history, past surgical history and problem list     Review of Systems   Respiratory: Positive for cough  Negative for apnea, chest tightness, shortness of breath and wheezing  Cardiovascular: Negative for chest pain, palpitations and leg swelling  Gastrointestinal: Negative for abdominal pain  Neurological: Negative for dizziness and light-headedness  Psychiatric/Behavioral: Negative  Objective:  Stable cardiac-wise  /60 (BP Location: Left arm, Patient Position: Sitting, Cuff Size: Large)   Pulse 75   Ht 6' 1" (1 854 m)   Wt 101 kg (223 lb)   SpO2 98%   BMI 29 42 kg/m²          Physical Exam  Vitals reviewed  Constitutional:       General: He is not in acute distress  Appearance: He is well-developed  He is not diaphoretic  HENT:      Head: Normocephalic  Eyes:      Pupils: Pupils are equal, round, and reactive to light  Neck:      Thyroid: No thyromegaly  Vascular: No JVD  Cardiovascular:      Rate and Rhythm: Normal rate and regular rhythm  Heart sounds: S1 normal and S2 normal  No murmur heard  No friction rub  No gallop  Pulmonary:      Effort: Pulmonary effort is normal  No respiratory distress  Breath sounds: Normal breath sounds  No wheezing or rales  Chest:      Chest wall: No tenderness  Abdominal:      Palpations: Abdomen is soft  Musculoskeletal:         General: No tenderness or deformity  Normal range of motion  Cervical back: Normal range of motion  Right lower leg: No edema  Left lower leg: No edema  Skin:     General: Skin is warm and dry  Neurological:      Mental Status: He is alert and oriented to person, place, and time     Psychiatric:         Mood and Affect: Mood normal          Behavior: Behavior normal

## 2022-04-26 NOTE — LETTER
April 26, 2022     Kymberly McclellandibaDO caridad  [address]  [address] [place] [address]    Patient: Mari Jenkins   YOB: 1955   Date of Visit: 4/26/2022       Dear Dr Jerry Ware Recipients: Thank you for referring Robert Engle to me for evaluation  Below are my notes for this consultation  If you have questions, please do not hesitate to call me  I look forward to following your patient along with you  Sincerely,        Jana Cali MD        CC: No Recipients  Jana Cali MD  4/26/2022  8:28 AM  Sign when Signing Visit  Assessment/Plan:    Hyperlipidemia    Hyperlipidemia, stable  The patient will continue fenofibrate at 145 mg daily  Hypertension    Hypertension, stable and adequately controlled  CAD (coronary artery disease)    Coronary artery disease, stable  No symptoms of angina or signs of heart failure  Diagnoses and all orders for this visit:    Coronary artery disease involving native coronary artery of native heart without angina pectoris    Primary hypertension    Mixed hyperlipidemia          Subjective:   Feels well from the cardiac standpoint  Patient ID: Mari Jenkins is a 78 y o  male  The patient presented to this office for the purpose of cardiac follow-up  He is known to have a history of coronary artery disease with hypertension and hyperlipidemia as well CLL  The patient was recently hospitalized with the double pneumonia and has some residual cough but he feels generally better  He denies any symptoms of chest pain, shortness of breath, palpitation dizziness or lightheadedness  He has no leg edema  The following portions of the patient's history were reviewed and updated as appropriate: allergies, current medications, past family history, past medical history, past social history, past surgical history and problem list     Review of Systems   Respiratory: Positive for cough   Negative for apnea, chest tightness, shortness of breath and wheezing  Cardiovascular: Negative for chest pain, palpitations and leg swelling  Gastrointestinal: Negative for abdominal pain  Neurological: Negative for dizziness and light-headedness  Psychiatric/Behavioral: Negative  Objective:  Stable cardiac-wise  /60 (BP Location: Left arm, Patient Position: Sitting, Cuff Size: Large)   Pulse 75   Ht 6' 1" (1 854 m)   Wt 101 kg (223 lb)   SpO2 98%   BMI 29 42 kg/m²          Physical Exam  Vitals reviewed  Constitutional:       General: He is not in acute distress  Appearance: He is well-developed  He is not diaphoretic  HENT:      Head: Normocephalic  Eyes:      Pupils: Pupils are equal, round, and reactive to light  Neck:      Thyroid: No thyromegaly  Vascular: No JVD  Cardiovascular:      Rate and Rhythm: Normal rate and regular rhythm  Heart sounds: S1 normal and S2 normal  No murmur heard  No friction rub  No gallop  Pulmonary:      Effort: Pulmonary effort is normal  No respiratory distress  Breath sounds: Normal breath sounds  No wheezing or rales  Chest:      Chest wall: No tenderness  Abdominal:      Palpations: Abdomen is soft  Musculoskeletal:         General: No tenderness or deformity  Normal range of motion  Cervical back: Normal range of motion  Right lower leg: No edema  Left lower leg: No edema  Skin:     General: Skin is warm and dry  Neurological:      Mental Status: He is alert and oriented to person, place, and time     Psychiatric:         Mood and Affect: Mood normal          Behavior: Behavior normal

## 2022-04-27 ENCOUNTER — TELEPHONE (OUTPATIENT)
Dept: ADMINISTRATIVE | Facility: OTHER | Age: 67
End: 2022-04-27

## 2022-04-27 NOTE — TELEPHONE ENCOUNTER
----- Message from Mane Lerner MA sent at 4/26/2022  2:03 PM EDT -----  Regarding: Care Gap Request  04/26/22 2:03 PM    Hello, our patient Chase Phalen has had Hepatitis C completed/performed  Please assist in updating the patient chart by pulling the Care Everywhere (CE) document  The date of service is 3/21/17       Thank you,  Mane Lerner MA  [facility]

## 2022-04-27 NOTE — TELEPHONE ENCOUNTER
Upon review of the In Basket request we were able to locate, review, and update the patient chart as requested for Hepatitis C   Any additional questions or concerns should be emailed to the Practice Liaisons via [email or web address] email, please do not reply via In Basket      Thank you  Diego Garza

## 2022-05-03 ENCOUNTER — HOSPITAL ENCOUNTER (OUTPATIENT)
Dept: INFUSION CENTER | Facility: CLINIC | Age: 67
Discharge: HOME | End: 2022-05-03
Payer: MEDICARE

## 2022-05-03 VITALS
TEMPERATURE: 97.7 F | RESPIRATION RATE: 18 BRPM | BODY MASS INDEX: 28.53 KG/M2 | DIASTOLIC BLOOD PRESSURE: 81 MMHG | HEART RATE: 67 BPM | WEIGHT: 216.27 LBS | SYSTOLIC BLOOD PRESSURE: 150 MMHG

## 2022-05-03 DIAGNOSIS — C91.10 CLL (CHRONIC LYMPHOCYTIC LEUKEMIA) (HCC): Primary | ICD-10-CM

## 2022-05-03 DIAGNOSIS — C91.10 CHRONIC LYMPHOCYTIC LEUKEMIA (HCC): ICD-10-CM

## 2022-05-03 DIAGNOSIS — D80.1 HYPOGAMMAGLOBULINEMIA, ACQUIRED (HCC): ICD-10-CM

## 2022-05-03 DIAGNOSIS — E78.2 MIXED HYPERLIPIDEMIA: ICD-10-CM

## 2022-05-03 LAB
ALBUMIN SERPL BCP-MCNC: 3.8 G/DL (ref 3.5–5)
ALP SERPL-CCNC: 44 U/L (ref 46–116)
ALT SERPL W P-5'-P-CCNC: 32 U/L (ref 12–78)
ANION GAP SERPL CALCULATED.3IONS-SCNC: 9 MMOL/L (ref 4–13)
AST SERPL W P-5'-P-CCNC: 22 U/L (ref 5–45)
BASOPHILS # BLD AUTO: 0.04 THOUSANDS/ΜL (ref 0–0.1)
BASOPHILS NFR BLD AUTO: 1 % (ref 0–1)
BILIRUB SERPL-MCNC: 0.46 MG/DL (ref 0.2–1)
BUN SERPL-MCNC: 18 MG/DL (ref 5–25)
CALCIUM SERPL-MCNC: 8.8 MG/DL (ref 8.3–10.1)
CHLORIDE SERPL-SCNC: 106 MMOL/L (ref 100–108)
CO2 SERPL-SCNC: 27 MMOL/L (ref 21–32)
CREAT SERPL-MCNC: 1.19 MG/DL (ref 0.6–1.3)
EOSINOPHIL # BLD AUTO: 0.22 THOUSAND/ΜL (ref 0–0.61)
EOSINOPHIL NFR BLD AUTO: 4 % (ref 0–6)
ERYTHROCYTE [DISTWIDTH] IN BLOOD BY AUTOMATED COUNT: 14.2 % (ref 11.6–15.1)
GFR SERPL CREATININE-BSD FRML MDRD: 62 ML/MIN/1.73SQ M
GLUCOSE P FAST SERPL-MCNC: 144 MG/DL (ref 65–99)
GLUCOSE SERPL-MCNC: 144 MG/DL (ref 65–140)
HCT VFR BLD AUTO: 42.2 % (ref 36.5–49.3)
HGB BLD-MCNC: 13.7 G/DL (ref 12–17)
HGB RETIC QN AUTO: 35.4 PG (ref 30–38.3)
IGG SERPL-MCNC: 476 MG/DL (ref 700–1600)
IMM GRANULOCYTES # BLD AUTO: 0.08 THOUSAND/UL (ref 0–0.2)
IMM GRANULOCYTES NFR BLD AUTO: 2 % (ref 0–2)
IMM RETICS NFR: 13.3 % (ref 0–14)
LYMPHOCYTES # BLD AUTO: 0.68 THOUSANDS/ΜL (ref 0.6–4.47)
LYMPHOCYTES NFR BLD AUTO: 13 % (ref 14–44)
MCH RBC QN AUTO: 29.5 PG (ref 26.8–34.3)
MCHC RBC AUTO-ENTMCNC: 32.5 G/DL (ref 31.4–37.4)
MCV RBC AUTO: 91 FL (ref 82–98)
MONOCYTES # BLD AUTO: 0.74 THOUSAND/ΜL (ref 0.17–1.22)
MONOCYTES NFR BLD AUTO: 14 % (ref 4–12)
NEUTROPHILS # BLD AUTO: 3.44 THOUSANDS/ΜL (ref 1.85–7.62)
NEUTS SEG NFR BLD AUTO: 66 % (ref 43–75)
NRBC BLD AUTO-RTO: 0 /100 WBCS
PLATELET # BLD AUTO: 143 THOUSANDS/UL (ref 149–390)
PMV BLD AUTO: 11.9 FL (ref 8.9–12.7)
POTASSIUM SERPL-SCNC: 4 MMOL/L (ref 3.5–5.3)
PROT SERPL-MCNC: 6.3 G/DL (ref 6.4–8.2)
RBC # BLD AUTO: 4.64 MILLION/UL (ref 3.88–5.62)
RETICS # AUTO: NORMAL 10*3/UL (ref 14356–105094)
RETICS # CALC: 1.76 % (ref 0.37–1.87)
SODIUM SERPL-SCNC: 142 MMOL/L (ref 136–145)
T4 FREE SERPL-MCNC: 0.96 NG/DL (ref 0.76–1.46)
TSH SERPL DL<=0.05 MIU/L-ACNC: 0.98 UIU/ML (ref 0.45–4.5)
WBC # BLD AUTO: 5.2 THOUSAND/UL (ref 4.31–10.16)

## 2022-05-03 PROCEDURE — 85046 RETICYTE/HGB CONCENTRATE: CPT

## 2022-05-03 PROCEDURE — 80053 COMPREHEN METABOLIC PANEL: CPT

## 2022-05-03 PROCEDURE — 96367 TX/PROPH/DG ADDL SEQ IV INF: CPT

## 2022-05-03 PROCEDURE — 96375 TX/PRO/DX INJ NEW DRUG ADDON: CPT

## 2022-05-03 PROCEDURE — 85025 COMPLETE CBC W/AUTO DIFF WBC: CPT

## 2022-05-03 PROCEDURE — 96365 THER/PROPH/DIAG IV INF INIT: CPT

## 2022-05-03 PROCEDURE — 82784 ASSAY IGA/IGD/IGG/IGM EACH: CPT

## 2022-05-03 PROCEDURE — 84439 ASSAY OF FREE THYROXINE: CPT

## 2022-05-03 PROCEDURE — 84443 ASSAY THYROID STIM HORMONE: CPT

## 2022-05-03 PROCEDURE — 96366 THER/PROPH/DIAG IV INF ADDON: CPT

## 2022-05-03 RX ORDER — SODIUM CHLORIDE 9 MG/ML
20 INJECTION, SOLUTION INTRAVENOUS ONCE
Status: COMPLETED | OUTPATIENT
Start: 2022-05-03 | End: 2022-05-03

## 2022-05-03 RX ORDER — ACETAMINOPHEN 325 MG/1
650 TABLET ORAL ONCE
Status: COMPLETED | OUTPATIENT
Start: 2022-05-03 | End: 2022-05-03

## 2022-05-03 RX ORDER — SODIUM CHLORIDE 9 MG/ML
20 INJECTION, SOLUTION INTRAVENOUS ONCE
Status: CANCELLED | OUTPATIENT
Start: 2022-05-31

## 2022-05-03 RX ORDER — ACETAMINOPHEN 325 MG/1
650 TABLET ORAL ONCE
Status: CANCELLED | OUTPATIENT
Start: 2022-05-31

## 2022-05-03 RX ADMIN — SODIUM CHLORIDE 20 ML/HR: 9 INJECTION, SOLUTION INTRAVENOUS at 08:30

## 2022-05-03 RX ADMIN — HYDROCORTISONE SODIUM SUCCINATE 100 MG: 100 INJECTION, POWDER, FOR SOLUTION INTRAMUSCULAR; INTRAVENOUS at 08:37

## 2022-05-03 RX ADMIN — DIPHENHYDRAMINE HYDROCHLORIDE 12.5 MG: 50 INJECTION, SOLUTION INTRAMUSCULAR; INTRAVENOUS at 09:15

## 2022-05-03 RX ADMIN — Medication 20 G: at 09:45

## 2022-05-03 RX ADMIN — ACETAMINOPHEN 650 MG: 325 TABLET, FILM COATED ORAL at 08:37

## 2022-05-03 NOTE — PROGRESS NOTES
Pt  Denies new symptoms or concerns today  Labs obtained as ordered  Pt  Will not receive Benjamin Job today  IVIG ordered for infusion today

## 2022-05-04 ENCOUNTER — TELEPHONE (OUTPATIENT)
Dept: DIABETES SERVICES | Facility: CLINIC | Age: 67
End: 2022-05-04

## 2022-05-04 NOTE — TELEPHONE ENCOUNTER
Pt would like a call back to discuss a few questions about Ashley  You saw him for [employer] training on 2/24/22  I offered to make f/u appt but pt would just like a call  Let me know if he needs appt   Thanks

## 2022-05-05 ENCOUNTER — HOSPITAL ENCOUNTER (OUTPATIENT)
Dept: INFUSION CENTER | Facility: CLINIC | Age: 67
Discharge: HOME | End: 2022-05-05
Payer: MEDICARE

## 2022-05-05 DIAGNOSIS — C91.10 CHRONIC LYMPHOCYTIC LEUKEMIA (HCC): Primary | ICD-10-CM

## 2022-05-05 PROCEDURE — M0220 HB TIXAGEV AND CILGAV INF ADMIN: HCPCS | Performed by: INTERNAL MEDICINE

## 2022-05-05 RX ADMIN — AZD7442 300 MG COMBINED: KIT at 14:59

## 2022-05-05 NOTE — PROGRESS NOTES
1 hour observation complete: No adverse reactions noted: Verified follow up appt with patient: AVS offered and declined

## 2022-05-05 NOTE — PROGRESS NOTES
Patient to Chema David: Offers no complaints at present time: Lab work ( 05/03/22 ) reviewed:  Within parameters to treat: Verbal consent given for EUA use: Written information provided

## 2022-05-05 NOTE — PROGRESS NOTES
Frankie per MD order: Injections given in Buttocks ( RUQ / LUQ ) without incident: No adverse reactions noted: Observation period to begin

## 2022-05-06 ENCOUNTER — HOSPITAL ENCOUNTER (OUTPATIENT)
Dept: CT IMAGING | Facility: HOSPITAL | Age: 67
Discharge: HOME | End: 2022-05-06
Attending: INTERNAL MEDICINE
Payer: MEDICARE

## 2022-05-06 DIAGNOSIS — F17.211 CIGARETTE NICOTINE DEPENDENCE IN REMISSION: ICD-10-CM

## 2022-05-06 DIAGNOSIS — R91.8 PULMONARY NODULES: ICD-10-CM

## 2022-05-06 PROCEDURE — 71250 CT THORAX DX C-: CPT

## 2022-05-06 PROCEDURE — G1004 CDSM NDSC: HCPCS

## 2022-05-09 DIAGNOSIS — E11.9 TYPE 2 DIABETES MELLITUS WITHOUT COMPLICATION, WITH LONG-TERM CURRENT USE OF INSULIN (HCC): Primary | ICD-10-CM

## 2022-05-09 DIAGNOSIS — Z79.4 TYPE 2 DIABETES MELLITUS WITHOUT COMPLICATION, WITH LONG-TERM CURRENT USE OF INSULIN (HCC): Primary | ICD-10-CM

## 2022-05-09 RX ORDER — FLASH GLUCOSE SENSOR
KIT MISCELLANEOUS
Qty: 2 EACH | Refills: 3 | Status: SHIPPED | OUTPATIENT
Start: 2022-05-09

## 2022-05-10 ENCOUNTER — HOSPITAL ENCOUNTER (OUTPATIENT)
Dept: BONE DENSITY | Facility: MEDICAL CENTER | Age: 67
Discharge: HOME | End: 2022-05-10
Payer: MEDICARE

## 2022-05-10 DIAGNOSIS — Z79.52 LONG TERM (CURRENT) USE OF SYSTEMIC STEROIDS: ICD-10-CM

## 2022-05-10 PROCEDURE — 77080 DXA BONE DENSITY AXIAL: CPT

## 2022-05-13 RX ORDER — SODIUM CHLORIDE 9 MG/ML
20 INJECTION, SOLUTION INTRAVENOUS ONCE
Status: CANCELLED | OUTPATIENT
Start: 2022-05-17

## 2022-05-13 RX ORDER — ACETAMINOPHEN 325 MG/1
650 TABLET ORAL ONCE
Status: CANCELLED | OUTPATIENT
Start: 2022-05-17

## 2022-05-17 ENCOUNTER — PATIENT OUTREACH (OUTPATIENT)
Dept: CASE MANAGEMENT | Facility: OTHER | Age: 67
End: 2022-05-17

## 2022-05-17 ENCOUNTER — HOSPITAL ENCOUNTER (OUTPATIENT)
Dept: INFUSION CENTER | Facility: CLINIC | Age: 67
Discharge: HOME | End: 2022-05-17
Payer: MEDICARE

## 2022-05-17 VITALS
DIASTOLIC BLOOD PRESSURE: 89 MMHG | SYSTOLIC BLOOD PRESSURE: 161 MMHG | HEART RATE: 69 BPM | WEIGHT: 227.96 LBS | RESPIRATION RATE: 18 BRPM | HEIGHT: 73 IN | TEMPERATURE: 97.5 F | BODY MASS INDEX: 30.21 KG/M2

## 2022-05-17 DIAGNOSIS — C91.10 CLL (CHRONIC LYMPHOCYTIC LEUKEMIA) (HCC): Primary | ICD-10-CM

## 2022-05-17 DIAGNOSIS — C91.10 CHRONIC LYMPHOCYTIC LEUKEMIA (HCC): ICD-10-CM

## 2022-05-17 LAB
ALBUMIN SERPL BCP-MCNC: 3.8 G/DL (ref 3.5–5)
ALP SERPL-CCNC: 50 U/L (ref 46–116)
ALT SERPL W P-5'-P-CCNC: 36 U/L (ref 12–78)
ANION GAP SERPL CALCULATED.3IONS-SCNC: 8 MMOL/L (ref 4–13)
AST SERPL W P-5'-P-CCNC: 32 U/L (ref 5–45)
BASOPHILS # BLD MANUAL: 0 THOUSAND/UL (ref 0–0.1)
BASOPHILS NFR MAR MANUAL: 0 % (ref 0–1)
BILIRUB SERPL-MCNC: 0.44 MG/DL (ref 0.2–1)
BUN SERPL-MCNC: 14 MG/DL (ref 5–25)
CALCIUM SERPL-MCNC: 9.1 MG/DL (ref 8.3–10.1)
CHLORIDE SERPL-SCNC: 105 MMOL/L (ref 100–108)
CO2 SERPL-SCNC: 27 MMOL/L (ref 21–32)
CREAT SERPL-MCNC: 1.22 MG/DL (ref 0.6–1.3)
EOSINOPHIL # BLD MANUAL: 0.14 THOUSAND/UL (ref 0–0.4)
EOSINOPHIL NFR BLD MANUAL: 3 % (ref 0–6)
ERYTHROCYTE [DISTWIDTH] IN BLOOD BY AUTOMATED COUNT: 13.9 % (ref 11.6–15.1)
GFR SERPL CREATININE-BSD FRML MDRD: 60 ML/MIN/1.73SQ M
GLUCOSE SERPL-MCNC: 137 MG/DL (ref 65–140)
HCT VFR BLD AUTO: 44.5 % (ref 36.5–49.3)
HGB BLD-MCNC: 14.5 G/DL (ref 12–17)
HGB RETIC QN AUTO: 34.9 PG (ref 30–38.3)
IGG SERPL-MCNC: 538 MG/DL (ref 700–1600)
IMM RETICS NFR: 14.1 % (ref 0–14)
LYMPHOCYTES # BLD AUTO: 0.52 THOUSAND/UL (ref 0.6–4.47)
LYMPHOCYTES # BLD AUTO: 11 % (ref 14–44)
MCH RBC QN AUTO: 30.3 PG (ref 26.8–34.3)
MCHC RBC AUTO-ENTMCNC: 32.6 G/DL (ref 31.4–37.4)
MCV RBC AUTO: 93 FL (ref 82–98)
MONOCYTES # BLD AUTO: 0.24 THOUSAND/UL (ref 0–1.22)
MONOCYTES NFR BLD: 5 % (ref 4–12)
NEUTROPHILS # BLD MANUAL: 3.83 THOUSAND/UL (ref 1.85–7.62)
NEUTS BAND NFR BLD MANUAL: 2 % (ref 0–8)
NEUTS SEG NFR BLD AUTO: 79 % (ref 43–75)
PLATELET # BLD AUTO: 141 THOUSANDS/UL (ref 149–390)
PLATELET BLD QL SMEAR: ABNORMAL
PMV BLD AUTO: 11.6 FL (ref 8.9–12.7)
POTASSIUM SERPL-SCNC: 4.1 MMOL/L (ref 3.5–5.3)
PROT SERPL-MCNC: 6.5 G/DL (ref 6.4–8.2)
RBC # BLD AUTO: 4.78 MILLION/UL (ref 3.88–5.62)
RBC MORPH BLD: NORMAL
RETICS # AUTO: ABNORMAL 10*3/UL (ref 14356–105094)
RETICS # CALC: 1.62 % (ref 0.37–1.87)
SODIUM SERPL-SCNC: 140 MMOL/L (ref 136–145)
WBC # BLD AUTO: 4.73 THOUSAND/UL (ref 4.31–10.16)

## 2022-05-17 PROCEDURE — 96367 TX/PROPH/DG ADDL SEQ IV INF: CPT

## 2022-05-17 PROCEDURE — 96415 CHEMO IV INFUSION ADDL HR: CPT

## 2022-05-17 PROCEDURE — 80053 COMPREHEN METABOLIC PANEL: CPT | Performed by: INTERNAL MEDICINE

## 2022-05-17 PROCEDURE — 82784 ASSAY IGA/IGD/IGG/IGM EACH: CPT

## 2022-05-17 PROCEDURE — 85007 BL SMEAR W/DIFF WBC COUNT: CPT | Performed by: INTERNAL MEDICINE

## 2022-05-17 PROCEDURE — 85027 COMPLETE CBC AUTOMATED: CPT | Performed by: INTERNAL MEDICINE

## 2022-05-17 PROCEDURE — 85046 RETICYTE/HGB CONCENTRATE: CPT

## 2022-05-17 PROCEDURE — 96413 CHEMO IV INFUSION 1 HR: CPT

## 2022-05-17 RX ORDER — SODIUM CHLORIDE 9 MG/ML
20 INJECTION, SOLUTION INTRAVENOUS ONCE
Status: COMPLETED | OUTPATIENT
Start: 2022-05-17 | End: 2022-05-17

## 2022-05-17 RX ORDER — ACETAMINOPHEN 325 MG/1
650 TABLET ORAL ONCE
Status: COMPLETED | OUTPATIENT
Start: 2022-05-17 | End: 2022-05-17

## 2022-05-17 RX ADMIN — DIPHENHYDRAMINE HYDROCHLORIDE 25 MG: 50 INJECTION, SOLUTION INTRAMUSCULAR; INTRAVENOUS at 08:59

## 2022-05-17 RX ADMIN — ACETAMINOPHEN 650 MG: 325 TABLET, FILM COATED ORAL at 08:34

## 2022-05-17 RX ADMIN — SODIUM CHLORIDE 20 ML/HR: 0.9 INJECTION, SOLUTION INTRAVENOUS at 08:31

## 2022-05-17 RX ADMIN — DEXAMETHASONE SODIUM PHOSPHATE 20 MG: 10 INJECTION, SOLUTION INTRAMUSCULAR; INTRAVENOUS at 08:31

## 2022-05-17 RX ADMIN — OBINUTUZUMAB 1000 MG: 1000 INJECTION, SOLUTION, CONCENTRATE INTRAVENOUS at 09:43

## 2022-05-17 NOTE — PROGRESS NOTES
OSW met with Nasrin Rahman today  He stated he is doing "fine" and doing well  Inquired about his recent hospital stay  He shared he does not know how he got double pneumonia, but he recovered and is doing much better  He presented in good spirits today  Offered support, however he stated he is doing well and denied any assistance  OSW will continue to follow

## 2022-05-18 ENCOUNTER — PATIENT OUTREACH (OUTPATIENT)
Dept: CASE MANAGEMENT | Facility: OTHER | Age: 67
End: 2022-05-18

## 2022-05-18 ENCOUNTER — PATIENT MESSAGE (OUTPATIENT)
Dept: PULMONOLOGY | Facility: CLINIC | Age: 67
End: 2022-05-18

## 2022-05-18 ENCOUNTER — TELEPHONE (OUTPATIENT)
Dept: HEMATOLOGY ONCOLOGY | Facility: CLINIC | Age: 67
End: 2022-05-18

## 2022-05-18 NOTE — PROGRESS NOTES
OSW received a message from [address] [place] [address]  Infusion technician requesting supportive call to Manda today  He called the infusion center asking about his medication he receives, because his pulmonologist is requesting he wear a mask to his appointment tomorrow and he is refusing  Ayala Schilling shared he was angry and upset  OSW reviewed chart and noted he has a call out to medical oncology office  Sent supportive text to Manda, who typically prefers texting for communication  He responded he was very upset about pulmonary and that he is "not suicidal " OSW asked if he could clarify this, and then called pt on phone  Manda shared his phone interaction with the pulmonary office, and that he spoke to "someone else" at the infusion center about his medication  He became very upset stating "I am not suicidal, my son committed suicide and I would never do something like that "  OSW asked for clarification, because this was not the message that was given from infusion center tech  He thought OSW was calling to assess for suicidal ideation  Explained text and call was only for support and to offer assistance from the phone call he had with pulmonary  He apologized for confusion  He stated someone from the pulmonary office did call and was going to look into his request for not masking  OSW apologized for his poor experience and offered support  Received message from Alice Sloan medical oncology she reached out to Manda for support  He reported to her he thought this writer was from a "suicide hotline "  She clarified role of OSW

## 2022-05-18 NOTE — TELEPHONE ENCOUNTER
Spoke with Christine Busch  He was upset that pulmonary staff was not listening to him about Frankie  Reviewed that in his condition Evusheld does better protect him likely better than the vaccine due to his CLL/low IgG/tx for CLL  He [facility] a [facility]  He states he did speak to someone in pulm that he does not need to wear a mask tomorrow  He will go to appt tomorrow  He also states he would wear a mask if asked but was upset no one was listening to him about Frankie

## 2022-05-19 ENCOUNTER — OFFICE VISIT (OUTPATIENT)
Dept: PULMONOLOGY | Facility: CLINIC | Age: 67
End: 2022-05-19
Payer: MEDICARE

## 2022-05-19 VITALS
DIASTOLIC BLOOD PRESSURE: 80 MMHG | BODY MASS INDEX: 29.95 KG/M2 | WEIGHT: 226 LBS | SYSTOLIC BLOOD PRESSURE: 130 MMHG | OXYGEN SATURATION: 97 % | RESPIRATION RATE: 16 BRPM | HEART RATE: 90 BPM | HEIGHT: 73 IN

## 2022-05-19 DIAGNOSIS — J44.9 CHRONIC OBSTRUCTIVE PULMONARY DISEASE, UNSPECIFIED COPD TYPE (HCC): ICD-10-CM

## 2022-05-19 DIAGNOSIS — J18.9 MULTIFOCAL PNEUMONIA: Primary | ICD-10-CM

## 2022-05-19 PROCEDURE — 99214 OFFICE O/P EST MOD 30 MIN: CPT | Performed by: NURSE PRACTITIONER

## 2022-05-19 NOTE — ASSESSMENT & PLAN NOTE
Chronic bronchitis/ mild COPD    Noted improvement with Arnold Hermosillo and will continue he does use his rescue inhaler but very infrequently and only typically with prolonged exertion  He does not require significant use of albuterol nebulizer  Overall his breathing is at baseline  He denies any limitations and denies any additional complaints at today's appointment

## 2022-05-19 NOTE — ASSESSMENT & PLAN NOTE
Hospitalized in April with multifocal pneumonia  Treated with IV antibiotics and discharged home on p o  Regimen    At the time of today's visit he reports his symptoms have completely resolved    He did obtain a repeat chest CT less than1 month after pneumonia dx which demonstrated improvement although persistent ground-glass opacities    At this time he is asymptomatic and therefore I will hold on additional testing and will defer to follow up appointment in 4 months with Dr Jose Levy

## 2022-05-19 NOTE — PROGRESS NOTES
Pulmonary Follow Up Note   Shai Moya 78 y o  male MRN: [id number]  5/19/2022      Assessment:    Multifocal pneumonia  Hospitalized in April with multifocal pneumonia  Treated with IV antibiotics and discharged home on p o  Regimen    At the time of today's visit he reports his symptoms have completely resolved    He did obtain a repeat chest CT less than1 month after pneumonia dx which demonstrated improvement although persistent ground-glass opacities  At this time he is asymptomatic and therefore I will hold on additional testing and will defer to follow up appointment in 4 months with Dr Randee Babin    Chronic obstructive pulmonary disease [facility])  Chronic bronchitis/ mild COPD    Noted improvement with [facility] and will continue he does use his rescue inhaler but very infrequently and only typically with prolonged exertion  He does not require significant use of albuterol nebulizer  Overall his breathing is at baseline  He denies any limitations and denies any additional complaints at today's appointment  Plan:    Diagnoses and all orders for this visit:    Multifocal pneumonia    Chronic obstructive pulmonary disease, unspecified COPD type ([facility] [address] )        Return in about 4 months (around 9/19/2022), or if symptoms worsen or fail to improve  History of Present Illness   HPI:  Shai Moya is a 78 y o  male who presented to the office for hospital follow-up  He was hospitalized in early April for multifocal pneumonia and treated with IV Rocephin and transitioned to oral antibiotics to complete course  At the time of today's visit he reports that his symptoms completely resolved and his breathing is at baseline  He denies cough chest pain, palpitations fevers, chills cough bronchospasm, cough or sputum production  Denies any significant dyspnea  He did complete a chest CT less than 1 month after his diagnosis of multifocal pneumonia which demonstrated improvement although persistent opacities    He is hesitant for repeat CT at this time  Reports compliance with his prescribed pulmonary regimen and only uses his rescue inhaler as needed with prolonged exertion  All questions and concerns addressed  Review of Systems   Constitutional: Negative for activity change and fatigue  HENT: Negative for congestion, postnasal drip and sinus pressure  Eyes: Negative for discharge and itching  Respiratory: Positive for shortness of breath (only with prolonged exertion)  Negative for apnea, cough, choking and chest tightness  Cardiovascular: Negative for chest pain  Gastrointestinal: Negative for abdominal distention and abdominal pain  Endocrine: Negative for cold intolerance  Genitourinary: Negative for difficulty urinating  Musculoskeletal: Negative for arthralgias  Skin: Negative for color change  Allergic/Immunologic: Negative for environmental allergies  Neurological: Negative for dizziness and headaches  Hematological: Negative for adenopathy  Psychiatric/Behavioral: Negative for agitation  All other systems reviewed and are negative        Historical Information   Past Medical History:   Diagnosis Date    Allergic     Anemia     Arthritis     CAD (coronary artery disease) 2004    Cancer ([facility] )     Leukemia    Cellulitis of scalp     CKD (chronic kidney disease) stage 3, GFR 30-59 ml/min ([facility])     CLL (chronic lymphocytic leukemia) ([facility])     [facility]     Diabetes mellitus ([facility] )     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    Dyslipidemia     Edema extremities     Esophageal ulcer     H/O blood clots     Hemolytic anemia ([facility])     Hiatal hernia     History of TIA (transient ischemic attack)     Hyperlipidemia     Hypertension     Listeria infection 2018    Multiple gastric ulcers     [facility] 2004    acute    Neutropenia (HCC)     Obese     Recurrent sinusitis     Sleep apnea     Thrombocytopenia ([facility])     Vertigo Past Surgical History:   Procedure Laterality Date    ARTHROSCOPIC REPAIR ACL      lt knee    CARDIAC SURGERY      cardiac cath with stent    FOOT SURGERY      IR PORT CHECK  2019    KNEE ARTHROSCOPY      OTHER SURGICAL HISTORY      cardiac cath lesion 1, 1st adjunct treat device: stent    PORTACATH PLACEMENT      [facility] ESOPHAGOGASTRODUODENOSCOPY TRANSORAL DIAGNOSTIC N/A 10/5/2017    Procedure: ESOPHAGOGASTRODUODENOSCOPY (EGD) with bx;  Surgeon: Pipe Ennis DO;  [facility]; Service: Gastroenterology    VA ESOPHAGOGASTRODUODENOSCOPY TRANSORAL DIAGNOSTIC N/A 3/8/2018    Procedure: ESOPHAGOGASTRODUODENOSCOPY (EGD) with biopsy;  Surgeon: Pipe Ennis DO;  [facility]; Service: Gastroenterology    VA ESOPHAGOGASTRODUODENOSCOPY TRANSORAL DIAGNOSTIC N/A 2018    Procedure: ESOPHAGOGASTRODUODENOSCOPY (EGD) with Dilation;  Surgeon: Pipe Ennis DO;  [facility]; Service: Gastroenterology    VA ESOPHAGOGASTRODUODENOSCOPY TRANSORAL DIAGNOSTIC N/A 10/18/2018    Procedure: ESOPHAGOGASTRODUODENOSCOPY (EGD) with dilation;  Surgeon: Mikey Martínez MD;  [facility];   Service: Gastroenterology    VA ESOPHAGOSCOPY FLEXIBLE TRANSORAL WITH BIOPSY N/A 2016    Procedure: ESOPHAGOGASTRODUODENOSCOPY (EGD); ESOPHAGEAL DILATION; ESOPHAGEAL BIOPSY;  Surgeon: Nam Cramer MD;  [facility];  Service: Thoracic    TONSILLECTOMY      UPPER GASTROINTESTINAL ENDOSCOPY      x 6     Family History   Problem Relation Age of Onset    Leukemia Mother         chronic    Colon polyps Mother         sidmoid    Parkinsonism Father        Social History     Tobacco Use   Smoking Status Former Smoker    Packs/day: 2 00    Years: 33 00    Pack years: 66 00    Types: Cigarettes    Start date: 1    Quit date:     Years since quittin 3   Smokeless Tobacco Never Used         Meds/Allergies     Current Outpatient Medications:     albuterol (2 5 mg/3 mL) 0  083 % nebulizer solution, Take 3 mL (2 5 mg total) by nebulization every 6 (six) hours as needed for wheezing or shortness of breath, Disp: 180 mL, Rfl: 5    amLODIPine (NORVASC) 10 mg tablet, TAKE 1 TABLET DAILY, Disp: 90 tablet, Rfl: 5    aspirin 81 MG tablet, Take 81 mg by mouth every other day Every other day , Disp: , Rfl:     benzonatate (TESSALON) 200 MG capsule, Take 1 capsule (200 mg total) by mouth 3 (three) times a day as needed for cough, Disp: 20 capsule, Rfl: 0    citalopram (CeleXA) 40 mg tablet, TAKE 1 TABLET DAILY, Disp: 90 tablet, Rfl: 5    Continuous Blood Gluc Sensor (FreeStyle Ashley 2 Sensor) MISC, Change every 14 days, Disp: 2 each, Rfl: 3    fenofibrate (TRICOR) 145 mg tablet, TAKE 1 TABLET DAILY, Disp: 90 tablet, Rfl: 5    folic acid (FOLVITE) 1 mg tablet, Take 800 mcg by mouth daily , Disp: , Rfl:     gabapentin (NEURONTIN) 600 MG tablet, Take 1 tablet (600 mg total) by mouth daily, Disp: 90 tablet, Rfl: 6    glucose monitoring kit (FREESTYLE) monitoring kit, 1 each by Does not apply route as needed ( ) E 11 9, Disp: 1 each, Rfl: 0    guaifenesin-codeine (GUAIFENESIN AC) 100-10 MG/5ML liquid, Take 5 mL by mouth 3 (three) times a day as needed for cough, Disp: 236 mL, Rfl: 0    insulin degludec Tricia Bright FlexTouch) 100 units/mL injection pen, Inject 15 Units under the skin daily at bedtime (Patient taking differently: Inject 17 Units under the skin daily at bedtime), Disp: 15 mL, Rfl: 1    insulin lispro (HumaLOG) 100 units/mL injection pen, Use 5-10 units before meals as needed for steroid induced hyperglycemia (Patient taking differently: Use 5-17 units before meals as needed for steroid induced hyperglycemia), Disp: 15 mL, Rfl: 0    Insulin Pen Needle (BD Pen Needle Inez U/F) 32G X 4 MM MISC, Use 3 (three) times a day, Disp: 300 each, Rfl: 1    Insulin Syringe-Needle U-100 30G X 1/2" 0 3 ML MISC, by Does not apply route 2 (two) times a day, Disp: 100 each, Rfl: 6    Lancets (FREESTYLE) lancets, Use as instructed--test 2 times a day  E 11 9, Disp: 100 each, Rfl: 0    LORazepam (ATIVAN) 0 5 mg tablet, Take 1 tablet (0 5 mg total) by mouth daily as needed for anxiety, Disp: 30 tablet, Rfl: 0    losartan (COZAAR) 100 MG tablet, TAKE 1 TABLET DAILY, Disp: 90 tablet, Rfl: 5    mometasone-formoterol (Dulera) 200-5 MCG/ACT inhaler, Inhale 2 puffs 2 (two) times a day Rinse mouth after use , Disp: 13 g, Rfl: 1    multivitamin (THERAGRAN) TABS, Take 1 tablet by mouth daily, Disp: , Rfl:     naloxone (NARCAN) 4 mg/0 1 mL nasal spray, Administer 1 spray into a nostril   If no response after 2-3 minutes, give another dose in the other nostril using a new spray , Disp: 1 each, Rfl: 1    nystatin (MYCOSTATIN) 500,000 units/5 mL suspension, Apply 2 mL (200,000 Units total) to the mouth or throat 4 (four) times a day, Disp: 473 mL, Rfl: 0    obinutuzumab (GAZYVA) 1000 mg/40 mL SOLN, Infuse into a venous catheter, Disp: , Rfl:     oxyCODONE (ROXICODONE) 10 MG TABS, Take 1 tablet (10 mg total) by mouth every 6 (six) hours as needed for moderate pain For ongoing pain controlMax Daily Amount: 40 mg, Disp: 90 tablet, Rfl: 0    pantoprazole (PROTONIX) 40 mg tablet, TAKE 1 TABLET DAILY, Disp: 90 tablet, Rfl: 5    Potassium (POTASSIMIN PO), Take 20 mEq by mouth daily  , Disp: , Rfl:     predniSONE 5 mg tablet, TAKE 1 TABLET DAILY, Disp: 90 tablet, Rfl: 5    sodium chloride, PF, 0 9 %, 10 mL by Intracatheter route see administration instructions 10mL into port before and after ampicillin doses, Disp: , Rfl: 0    zolpidem (AMBIEN) 5 mg tablet, Take 1 tablet (5 mg total) by mouth daily at bedtime as needed for sleep, Disp: 30 tablet, Rfl: 0    acyclovir (ZOVIRAX) 400 MG tablet, TAKE 1 TABLET TWICE A DAY, Disp: 60 tablet, Rfl: 11    Ibrutinib 140 MG TABS, Take 140 mg by mouth daily, Disp: 30 tablet, Rfl: 6  Allergies   Allergen Reactions    Heparin Other (See Comments)     Other reaction(s): Blood Disorders  clot    Macrolides And Ketolides Other (See Comments)     Interacts with other meds he is taking    Simvastatin Myalgia       Vitals: Blood pressure 130/80, pulse 90, resp  rate 16, height 6' 1" (1 854 m), weight 103 kg (226 lb), SpO2 97 %  Body mass index is 29 82 kg/m²  Oxygen Therapy  SpO2: 97 %  Oxygen Therapy: None (Room air)    Physical Exam  Physical Exam  Vitals reviewed  Constitutional:       Appearance: Normal appearance  He is normal weight  HENT:      Head: Normocephalic and atraumatic  Nose: Nose normal       Mouth/Throat:      Mouth: Mucous membranes are moist    Eyes:      Pupils: Pupils are equal, round, and reactive to light  Cardiovascular:      Rate and Rhythm: Normal rate  Pulses: Normal pulses  Pulmonary:      Effort: Pulmonary effort is normal       Breath sounds: Normal breath sounds  Abdominal:      General: Abdomen is flat  Palpations: Abdomen is soft  Musculoskeletal:         General: Normal range of motion  Cervical back: Normal range of motion  Skin:     General: Skin is warm  Neurological:      General: No focal deficit present  Mental Status: He is alert  Mental status is at baseline  Psychiatric:         Mood and Affect: Mood normal          Thought Content: Thought content normal          Labs: I have personally reviewed pertinent lab results    Lab Results   Component Value Date    WBC 4 73 05/17/2022    HGB 14 5 05/17/2022    HCT 44 5 05/17/2022    MCV 93 05/17/2022     (L) 05/17/2022     Lab Results   Component Value Date    GLUCOSE 109 12/29/2015    CALCIUM 9 1 05/17/2022     12/29/2015    K 4 1 05/17/2022    CO2 27 05/17/2022     05/17/2022    BUN 14 05/17/2022    CREATININE 1 22 05/17/2022     No results found for: IGE  Lab Results   Component Value Date    ALT 36 05/17/2022    AST 32 05/17/2022    ALKPHOS 50 05/17/2022    BILITOT 0 7 12/29/2015     2  Imaging and other studies: I have personally reviewed pertinent reports   , I have personally reviewed pertinent films in PACS and Chest CT 5/6/22     FINDINGS:     LUNGS:  Previous multifocal pulmonary opacities have improved but persist     Groundglass opacity right upper lobe persists (3/64)  There is a stable 3 mm right upper lobe nodule (3/54)  Left upper lobe groundglass opacity persists (3/60)  Previous opacity in the posterior right upper lobe adjacent to the major fissure has improved but persists (3/73)  Bibasilar consolidative changes have improved but persist   There is no tracheal or endobronchial lesion  3 mm linear nodule in the lingula is unchanged (3/88)              Pulmonary function testing:  Performed 4/13/22       Study Interpretation:   ·   Normal lung volumes  ·   Normal airflow on vital capacity  ·   No significant improvement in airflow or vital capacity following the administration of bronchodilators  ·   Normal corrected diffusion capacity    ·   Normal airway resistance as indicated by the specific airway conductance

## 2022-05-25 DIAGNOSIS — I10 ESSENTIAL HYPERTENSION: ICD-10-CM

## 2022-05-25 RX ORDER — LOSARTAN POTASSIUM 100 MG/1
TABLET ORAL
Qty: 90 TABLET | Refills: 5 | Status: SHIPPED | OUTPATIENT
Start: 2022-05-25

## 2022-05-31 ENCOUNTER — HOSPITAL ENCOUNTER (OUTPATIENT)
Dept: INFUSION CENTER | Facility: CLINIC | Age: 67
Discharge: HOME | End: 2022-05-31
Payer: MEDICARE

## 2022-05-31 VITALS
WEIGHT: 229.28 LBS | DIASTOLIC BLOOD PRESSURE: 88 MMHG | RESPIRATION RATE: 18 BRPM | SYSTOLIC BLOOD PRESSURE: 148 MMHG | BODY MASS INDEX: 30.25 KG/M2 | TEMPERATURE: 98 F | HEART RATE: 78 BPM

## 2022-05-31 DIAGNOSIS — C91.10 CLL (CHRONIC LYMPHOCYTIC LEUKEMIA) (HCC): Primary | ICD-10-CM

## 2022-05-31 DIAGNOSIS — D80.1 HYPOGAMMAGLOBULINEMIA, ACQUIRED (HCC): ICD-10-CM

## 2022-05-31 PROCEDURE — 96375 TX/PRO/DX INJ NEW DRUG ADDON: CPT

## 2022-05-31 PROCEDURE — 96365 THER/PROPH/DIAG IV INF INIT: CPT

## 2022-05-31 RX ORDER — SODIUM CHLORIDE 9 MG/ML
20 INJECTION, SOLUTION INTRAVENOUS ONCE
Status: COMPLETED | OUTPATIENT
Start: 2022-05-31 | End: 2022-05-31

## 2022-05-31 RX ORDER — ACETAMINOPHEN 325 MG/1
650 TABLET ORAL ONCE
Status: COMPLETED | OUTPATIENT
Start: 2022-05-31 | End: 2022-05-31

## 2022-05-31 RX ORDER — ACETAMINOPHEN 325 MG/1
650 TABLET ORAL ONCE
Status: CANCELLED | OUTPATIENT
Start: 2022-06-28

## 2022-05-31 RX ORDER — SODIUM CHLORIDE 9 MG/ML
20 INJECTION, SOLUTION INTRAVENOUS ONCE
Status: CANCELLED | OUTPATIENT
Start: 2022-06-28

## 2022-05-31 RX ADMIN — Medication 20 G: at 08:51

## 2022-05-31 RX ADMIN — HYDROCORTISONE SODIUM SUCCINATE 100 MG: 100 INJECTION, POWDER, FOR SOLUTION INTRAMUSCULAR; INTRAVENOUS at 08:25

## 2022-05-31 RX ADMIN — DIPHENHYDRAMINE HYDROCHLORIDE 12.5 MG: 50 INJECTION, SOLUTION INTRAMUSCULAR; INTRAVENOUS at 08:29

## 2022-05-31 RX ADMIN — SODIUM CHLORIDE 20 ML/HR: 9 INJECTION, SOLUTION INTRAVENOUS at 08:22

## 2022-05-31 RX ADMIN — ACETAMINOPHEN 650 MG: 325 TABLET, FILM COATED ORAL at 08:28

## 2022-05-31 NOTE — PROGRESS NOTES
Pt  Arrived to unit without complaint, Tolerated IVIG without adverse event  Future appointments reviewed  AVS  Declined

## 2022-05-31 NOTE — PLAN OF CARE
Problem: Potential for Falls  Goal: Patient will remain free of falls  Description: INTERVENTIONS:  - Educate patient/family on patient safety including physical limitations  - Instruct patient to call for assistance with activity   - Consult OT/PT to assist with strengthening/mobility   - Keep Call bell within reach  - Keep bed low and locked with side rails adjusted as appropriate  - Keep care items and personal belongings within reach  - Initiate and maintain comfort rounds  - Make Fall Risk Sign visible to staff  -   Problem: Potential for Falls  Goal: Patient will remain free of falls  Description: INTERVENTIONS:  - Educate patient/family on patient safety including physical limitations  - Instruct patient to call for assistance with activity   - Consult OT/PT to assist with strengthening/mobility   - Keep Call bell within reach  - Keep bed low and locked with side rails adjusted as appropriate  - Keep care items and personal belongings within reach  - Initiate and maintain comfort rounds  - - Consider moving patient to room near nurses station  Outcome: Progressing   - O- Consider moving patient to room near nurses station  Outcome: Progressing

## 2022-06-07 RX ORDER — SODIUM CHLORIDE 9 MG/ML
20 INJECTION, SOLUTION INTRAVENOUS ONCE
Status: CANCELLED | OUTPATIENT
Start: 2022-06-14

## 2022-06-07 RX ORDER — ACETAMINOPHEN 325 MG/1
650 TABLET ORAL ONCE
Status: CANCELLED | OUTPATIENT
Start: 2022-06-14

## 2022-06-14 ENCOUNTER — HOSPITAL ENCOUNTER (OUTPATIENT)
Dept: INFUSION CENTER | Facility: CLINIC | Age: 67
Discharge: HOME | End: 2022-06-14
Payer: MEDICARE

## 2022-06-14 ENCOUNTER — PATIENT OUTREACH (OUTPATIENT)
Dept: CASE MANAGEMENT | Facility: OTHER | Age: 67
End: 2022-06-14

## 2022-06-14 VITALS
HEIGHT: 73 IN | HEART RATE: 87 BPM | DIASTOLIC BLOOD PRESSURE: 79 MMHG | RESPIRATION RATE: 18 BRPM | BODY MASS INDEX: 30.26 KG/M2 | TEMPERATURE: 98 F | SYSTOLIC BLOOD PRESSURE: 156 MMHG

## 2022-06-14 DIAGNOSIS — C91.10 CHRONIC LYMPHOCYTIC LEUKEMIA (HCC): ICD-10-CM

## 2022-06-14 DIAGNOSIS — C91.10 CLL (CHRONIC LYMPHOCYTIC LEUKEMIA) (HCC): Primary | ICD-10-CM

## 2022-06-14 LAB
ALBUMIN SERPL BCP-MCNC: 3.6 G/DL (ref 3.5–5)
ALP SERPL-CCNC: 71 U/L (ref 46–116)
ALT SERPL W P-5'-P-CCNC: 35 U/L (ref 12–78)
ANION GAP SERPL CALCULATED.3IONS-SCNC: 8 MMOL/L (ref 4–13)
AST SERPL W P-5'-P-CCNC: 22 U/L (ref 5–45)
BASOPHILS # BLD MANUAL: 0 THOUSAND/UL (ref 0–0.1)
BASOPHILS NFR MAR MANUAL: 0 % (ref 0–1)
BILIRUB SERPL-MCNC: 0.32 MG/DL (ref 0.2–1)
BUN SERPL-MCNC: 21 MG/DL (ref 5–25)
CALCIUM SERPL-MCNC: 8.8 MG/DL (ref 8.3–10.1)
CHLORIDE SERPL-SCNC: 105 MMOL/L (ref 100–108)
CO2 SERPL-SCNC: 27 MMOL/L (ref 21–32)
CREAT SERPL-MCNC: 1.25 MG/DL (ref 0.6–1.3)
EOSINOPHIL # BLD MANUAL: 0.25 THOUSAND/UL (ref 0–0.4)
EOSINOPHIL NFR BLD MANUAL: 5 % (ref 0–6)
ERYTHROCYTE [DISTWIDTH] IN BLOOD BY AUTOMATED COUNT: 14.2 % (ref 11.6–15.1)
GFR SERPL CREATININE-BSD FRML MDRD: 59 ML/MIN/1.73SQ M
GLUCOSE SERPL-MCNC: 272 MG/DL (ref 65–140)
HCT VFR BLD AUTO: 43 % (ref 36.5–49.3)
HGB BLD-MCNC: 13.9 G/DL (ref 12–17)
HGB RETIC QN AUTO: 34.9 PG (ref 30–38.3)
IGG SERPL-MCNC: 514 MG/DL (ref 700–1600)
IMM RETICS NFR: 11.4 % (ref 0–14)
LYMPHOCYTES # BLD AUTO: 0.29 THOUSAND/UL (ref 0.6–4.47)
LYMPHOCYTES # BLD AUTO: 6 % (ref 14–44)
MCH RBC QN AUTO: 29.7 PG (ref 26.8–34.3)
MCHC RBC AUTO-ENTMCNC: 32.3 G/DL (ref 31.4–37.4)
MCV RBC AUTO: 92 FL (ref 82–98)
MONOCYTES # BLD AUTO: 0.39 THOUSAND/UL (ref 0–1.22)
MONOCYTES NFR BLD: 8 % (ref 4–12)
NEUTROPHILS # BLD MANUAL: 3.88 THOUSAND/UL (ref 1.85–7.62)
NEUTS BAND NFR BLD MANUAL: 2 % (ref 0–8)
NEUTS SEG NFR BLD AUTO: 77 % (ref 43–75)
PLATELET # BLD AUTO: 147 THOUSANDS/UL (ref 149–390)
PLATELET BLD QL SMEAR: ABNORMAL
PMV BLD AUTO: 11.9 FL (ref 8.9–12.7)
POTASSIUM SERPL-SCNC: 3.8 MMOL/L (ref 3.5–5.3)
PROT SERPL-MCNC: 6.2 G/DL (ref 6.4–8.2)
RBC # BLD AUTO: 4.68 MILLION/UL (ref 3.88–5.62)
RBC MORPH BLD: NORMAL
RETICS # AUTO: NORMAL 10*3/UL (ref 14356–105094)
RETICS # CALC: 1.85 % (ref 0.37–1.87)
SODIUM SERPL-SCNC: 140 MMOL/L (ref 136–145)
VARIANT LYMPHS # BLD AUTO: 2 %
WBC # BLD AUTO: 4.91 THOUSAND/UL (ref 4.31–10.16)

## 2022-06-14 PROCEDURE — 85027 COMPLETE CBC AUTOMATED: CPT

## 2022-06-14 PROCEDURE — 80053 COMPREHEN METABOLIC PANEL: CPT

## 2022-06-14 PROCEDURE — 96367 TX/PROPH/DG ADDL SEQ IV INF: CPT

## 2022-06-14 PROCEDURE — 96413 CHEMO IV INFUSION 1 HR: CPT

## 2022-06-14 PROCEDURE — 82784 ASSAY IGA/IGD/IGG/IGM EACH: CPT

## 2022-06-14 PROCEDURE — 96415 CHEMO IV INFUSION ADDL HR: CPT

## 2022-06-14 PROCEDURE — 85007 BL SMEAR W/DIFF WBC COUNT: CPT

## 2022-06-14 PROCEDURE — 85046 RETICYTE/HGB CONCENTRATE: CPT

## 2022-06-14 RX ORDER — ACETAMINOPHEN 325 MG/1
650 TABLET ORAL ONCE
Status: COMPLETED | OUTPATIENT
Start: 2022-06-14 | End: 2022-06-14

## 2022-06-14 RX ORDER — SODIUM CHLORIDE 9 MG/ML
20 INJECTION, SOLUTION INTRAVENOUS ONCE
Status: COMPLETED | OUTPATIENT
Start: 2022-06-14 | End: 2022-06-14

## 2022-06-14 RX ADMIN — ACETAMINOPHEN 650 MG: 325 TABLET, FILM COATED ORAL at 08:39

## 2022-06-14 RX ADMIN — OBINUTUZUMAB 1000 MG: 1000 INJECTION, SOLUTION, CONCENTRATE INTRAVENOUS at 10:37

## 2022-06-14 RX ADMIN — DIPHENHYDRAMINE HYDROCHLORIDE 25 MG: 50 INJECTION, SOLUTION INTRAMUSCULAR; INTRAVENOUS at 09:03

## 2022-06-14 RX ADMIN — SODIUM CHLORIDE 20 ML/HR: 0.9 INJECTION, SOLUTION INTRAVENOUS at 08:30

## 2022-06-14 RX ADMIN — DEXAMETHASONE SODIUM PHOSPHATE 20 MG: 100 INJECTION INTRAMUSCULAR; INTRAVENOUS at 08:38

## 2022-06-14 NOTE — PROGRESS NOTES
Supportive visit offered to [facility] today  He was in good spirits today, and was receptive to talking with OSW  He shared his experience with the pulmonology office was addressed and rectified to his satisfaction  The staff and provider looked into his clinical trial and seem to understand much better what the guidelines are  He shared he has participated in many clinical trials since his diagnosis 22 years ago  He stated he wants to help others and is "totally fine being the guinea pig " He also stated he is at peace with his death, and if these medicines stop working, then he is ok with that too  He reflected on his working history as a [place]   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 stated he enjoys long-term  He has a large garden and likes to be busy  He talked about his son's suicide  [facility] describes his son, Veronica Nolan as a very musically intelligent young man with the ability to write music and songs by just "hearing them in his head " He also stated Veronica Nolan was very delayed with other issues, like motor skills, mechanical objects, etc  He stated his son didn't walk until he was 2years old  Veronica Nolan left a suicide note and [facility] talked about it's brief contents  He is very worried about his wife, Flaco Stanford, who continues to be grief stricken over their loss  She will not remove or get rid of anything in his room  She cleans and straightens up Buck's bedroom regularly  They both talk about "what could've been done differently" on that day of his death  [facility] stated he feels he has accepted things better than his wife  [facility] and Flaco Stanford are close to neighbors of theirs who have a 3year old daughter  They started bringing her over to spend time with Flaco Stanford, and it seems to have been a very positive move   [facility] states she will play with the little girl and buy her small things from the store when they go out shopping  She will help her draw with sidewalk chalk outside  He thinks these visits have helped bring her some candelaria  Much time spent talking with Italo Prado today  Provided emotional support and active listening  Will continue to follow and check in with him in about 1 month

## 2022-06-15 ENCOUNTER — TELEPHONE (OUTPATIENT)
Dept: OTHER | Facility: OTHER | Age: 67
End: 2022-06-15

## 2022-06-15 DIAGNOSIS — G89.3 CANCER RELATED PAIN: ICD-10-CM

## 2022-06-15 RX ORDER — OXYCODONE HYDROCHLORIDE 10 MG/1
10 TABLET ORAL EVERY 6 HOURS PRN
Qty: 90 TABLET | Refills: 0 | Status: SHIPPED | OUTPATIENT
Start: 2022-06-15

## 2022-06-16 ENCOUNTER — TELEPHONE (OUTPATIENT)
Dept: GASTROENTEROLOGY | Facility: MEDICAL CENTER | Age: 67
End: 2022-06-16

## 2022-06-16 NOTE — TELEPHONE ENCOUNTER
Patient having trouble swallowing  Scheduled for EGD/Dilation         Scheduled date of egd/dilation (as of today) 06/20/22  Physician performing : harvinder  Location of procedure : [place]  Bowel prep reviewed with patient: egd and diabetes instructions  Instructions reviewed with patient by: email  Clearances: ruma

## 2022-06-17 DIAGNOSIS — E78.2 MIXED HYPERLIPIDEMIA: ICD-10-CM

## 2022-06-20 ENCOUNTER — ANESTHESIA EVENT (OUTPATIENT)
Dept: GASTROENTEROLOGY | Facility: HOSPITAL | Age: 67
End: 2022-06-20

## 2022-06-20 ENCOUNTER — ANESTHESIA (OUTPATIENT)
Dept: GASTROENTEROLOGY | Facility: HOSPITAL | Age: 67
End: 2022-06-20

## 2022-06-20 ENCOUNTER — HOSPITAL ENCOUNTER (OUTPATIENT)
Dept: GASTROENTEROLOGY | Facility: HOSPITAL | Age: 67
Setting detail: OUTPATIENT SURGERY
Discharge: HOME | End: 2022-06-20
Attending: INTERNAL MEDICINE
Payer: MEDICARE

## 2022-06-20 VITALS
TEMPERATURE: 99.3 F | RESPIRATION RATE: 16 BRPM | DIASTOLIC BLOOD PRESSURE: 81 MMHG | BODY MASS INDEX: 29.22 KG/M2 | HEART RATE: 88 BPM | OXYGEN SATURATION: 98 % | HEIGHT: 73 IN | SYSTOLIC BLOOD PRESSURE: 153 MMHG | WEIGHT: 220.46 LBS

## 2022-06-20 DIAGNOSIS — R13.19 ESOPHAGEAL DYSPHAGIA: ICD-10-CM

## 2022-06-20 DIAGNOSIS — K21.9 GASTROESOPHAGEAL REFLUX DISEASE WITHOUT ESOPHAGITIS: ICD-10-CM

## 2022-06-20 PROCEDURE — 88312 SPECIAL STAINS GROUP 1: CPT | Performed by: PATHOLOGY

## 2022-06-20 PROCEDURE — 43239 EGD BIOPSY SINGLE/MULTIPLE: CPT | Performed by: INTERNAL MEDICINE

## 2022-06-20 PROCEDURE — 88305 TISSUE EXAM BY PATHOLOGIST: CPT | Performed by: PATHOLOGY

## 2022-06-20 PROCEDURE — 88341 IMHCHEM/IMCYTCHM EA ADD ANTB: CPT | Performed by: PATHOLOGY

## 2022-06-20 PROCEDURE — 88342 IMHCHEM/IMCYTCHM 1ST ANTB: CPT | Performed by: PATHOLOGY

## 2022-06-20 PROCEDURE — 43249 ESOPH EGD DILATION <30 MM: CPT | Performed by: INTERNAL MEDICINE

## 2022-06-20 PROCEDURE — C1726 CATH, BAL DIL, NON-VASCULAR: HCPCS

## 2022-06-20 RX ORDER — FENOFIBRATE 145 MG/1
TABLET, COATED ORAL
Qty: 90 TABLET | Refills: 5 | Status: SHIPPED | OUTPATIENT
Start: 2022-06-20

## 2022-06-20 RX ORDER — PROPOFOL 10 MG/ML
INJECTION, EMULSION INTRAVENOUS AS NEEDED
Status: DISCONTINUED | OUTPATIENT
Start: 2022-06-20 | End: 2022-06-20

## 2022-06-20 RX ORDER — LIDOCAINE HYDROCHLORIDE 10 MG/ML
INJECTION, SOLUTION EPIDURAL; INFILTRATION; INTRACAUDAL; PERINEURAL AS NEEDED
Status: DISCONTINUED | OUTPATIENT
Start: 2022-06-20 | End: 2022-06-20

## 2022-06-20 RX ORDER — PROPOFOL 10 MG/ML
INJECTION, EMULSION INTRAVENOUS CONTINUOUS PRN
Status: DISCONTINUED | OUTPATIENT
Start: 2022-06-20 | End: 2022-06-20

## 2022-06-20 RX ORDER — PANTOPRAZOLE SODIUM 40 MG/1
40 TABLET, DELAYED RELEASE ORAL 2 TIMES DAILY
Qty: 180 TABLET | Refills: 3 | Status: SHIPPED | OUTPATIENT
Start: 2022-06-20 | End: 2022-06-29 | Stop reason: SDUPTHER

## 2022-06-20 RX ORDER — SODIUM CHLORIDE 9 MG/ML
125 INJECTION, SOLUTION INTRAVENOUS CONTINUOUS
Status: DISCONTINUED | OUTPATIENT
Start: 2022-06-20 | End: 2022-06-24 | Stop reason: HOSPADM

## 2022-06-20 RX ADMIN — LIDOCAINE HYDROCHLORIDE 50 MG: 10 INJECTION, SOLUTION EPIDURAL; INFILTRATION; INTRACAUDAL; PERINEURAL at 11:00

## 2022-06-20 RX ADMIN — PROPOFOL 30 MG: 10 INJECTION, EMULSION INTRAVENOUS at 11:03

## 2022-06-20 RX ADMIN — PROPOFOL 50 MG: 10 INJECTION, EMULSION INTRAVENOUS at 11:02

## 2022-06-20 RX ADMIN — SODIUM CHLORIDE 125 ML/HR: 0.9 INJECTION, SOLUTION INTRAVENOUS at 10:02

## 2022-06-20 RX ADMIN — PROPOFOL 120 MCG/KG/MIN: 10 INJECTION, EMULSION INTRAVENOUS at 11:03

## 2022-06-20 RX ADMIN — PROPOFOL 100 MG: 10 INJECTION, EMULSION INTRAVENOUS at 11:00

## 2022-06-20 RX ADMIN — PROPOFOL 30 MG: 10 INJECTION, EMULSION INTRAVENOUS at 11:08

## 2022-06-20 NOTE — ANESTHESIA PREPROCEDURE EVALUATION
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)   (+) Hemolytic anemia (HCC)   (+) Pancytopenia (HCC)   (+) Thrombocytopenia (HCC)      NEURO/PSYCH   (+) Chronic right shoulder pain   (+) Depression, recurrent (HCC)   (+) H/O blood clots   (+) Headache around the eyes   (+) History of TIA (transient ischemic attack)      PULMONARY   (+) Chronic obstructive pulmonary disease (HCC)   (+) Multifocal pneumonia      Other   (+) CLL (chronic lymphocytic leukemia) (HCC)   (+) Chronic lymphocytic leukemia (HCC)        Physical Exam    Airway    Mallampati score: II  TM Distance: >3 FB  Neck ROM: full     Dental       Cardiovascular  Cardiovascular exam normal    Pulmonary  Pulmonary exam normal     Other Findings        Anesthesia Plan  ASA Score- 3     Anesthesia Type- IV sedation with anesthesia with ASA Monitors  Additional Monitors:   Airway Plan:           Plan Factors-Exercise tolerance (METS): >4 METS  Chart reviewed  EKG reviewed  Existing labs reviewed  Patient summary reviewed  Patient is not a current smoker  Induction- intravenous  Postoperative Plan-     Informed Consent- Anesthetic plan and risks discussed with patient  I personally reviewed this patient with the CRNA  Discussed and agreed on the Anesthesia Plan with the CRNA  Roselia Alvarez

## 2022-06-20 NOTE — H&P
History and Physical - [facility]  Aurora Palma 78 y o  male MRN: [id number]                  HPI: Aurora Palma is a 78y o  year old male who presents for EGD due to benign narrowing of his esophagus requiring dilation in the past         REVIEW OF SYSTEMS: Per the HPI, and otherwise unremarkable  Historical Information   Past Medical History:   Diagnosis Date    Allergic     Anemia     Arthritis     CAD (coronary artery disease) 2004    [facility]     Leukemia    Cellulitis of scalp     CKD (chronic kidney disease) stage 3, GFR 30-59 ml/min (HCC)     CLL (chronic lymphocytic leukemia) (HCC)     COPD (chronic obstructive pulmonary disease) ([facility] )     Diabetes mellitus ([facility] )     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    Dyslipidemia     Edema extremities     Esophageal ulcer     H/O blood clots     Hemolytic anemia (HCC)     Hiatal hernia     History of TIA (transient ischemic attack)     Hyperlipidemia     Hypertension     Listeria infection 2018    Multiple gastric ulcers     Myocardial infarction ([facility] ) 2004    acute    Neutropenia (HCC)     Obese     Recurrent sinusitis     Thrombocytopenia (HCC)     Vertigo      Past Surgical History:   Procedure Laterality Date    ARTHROSCOPIC REPAIR ACL      lt knee    CARDIAC SURGERY      cardiac cath with stent    FOOT SURGERY      IR PORT CHECK  5/17/2019    KNEE ARTHROSCOPY      OTHER SURGICAL HISTORY      cardiac cath lesion 1, 1st adjunct treat device: stent    PORTACATH PLACEMENT      MD ESOPHAGOGASTRODUODENOSCOPY TRANSORAL DIAGNOSTIC N/A 10/5/2017    Procedure: ESOPHAGOGASTRODUODENOSCOPY (EGD) with bx;  Surgeon: Stephon Venegas DO;  [facility]; Service: Gastroenterology    MD ESOPHAGOGASTRODUODENOSCOPY TRANSORAL DIAGNOSTIC N/A 3/8/2018    Procedure: ESOPHAGOGASTRODUODENOSCOPY (EGD) with biopsy;  Surgeon: Stephon Venegas DO;  [facility];   Service: Gastroenterology    MD ESOPHAGOGASTRODUODENOSCOPY TRANSORAL DIAGNOSTIC N/A 2018    Procedure: ESOPHAGOGASTRODUODENOSCOPY (EGD) with Dilation;  Surgeon: Brittany Chávez DO;  [facility]; Service: Gastroenterology    [place] ESOPHAGOGASTRODUODENOSCOPY TRANSORAL DIAGNOSTIC N/A 10/18/2018    Procedure: ESOPHAGOGASTRODUODENOSCOPY (EGD) with dilation;  Surgeon: Manjinder Morrison MD;  [facility];   Service: Gastroenterology    KS ESOPHAGOSCOPY FLEXIBLE TRANSORAL WITH BIOPSY N/A 2016    Procedure: ESOPHAGOGASTRODUODENOSCOPY (EGD); ESOPHAGEAL DILATION; ESOPHAGEAL BIOPSY;  Surgeon: Geovanni Hobson MD;  [facility];  Service: Thoracic    TONSILLECTOMY      UPPER GASTROINTESTINAL ENDOSCOPY      x 6     Social History   Social History     Substance and Sexual Activity   Alcohol Use Yes    Alcohol/week: 2 0 standard drinks    Types: 2 Cans of beer per week    Comment: social use as per Allscripts     Social History     Substance and Sexual Activity   Drug Use No     Social History     Tobacco Use   Smoking Status Former Smoker    Packs/day: 2 00    Years: 33 00    Pack years: 66 00    Types: Cigarettes    Start date:     Quit date:     Years since quittin 4   Smokeless Tobacco Never Used     Family History   Problem Relation Age of Onset    Leukemia Mother         chronic    Colon polyps Mother         sidmoid    Parkinsonism Father        Meds/Allergies       Current Outpatient Medications:     amLODIPine (NORVASC) 10 mg tablet    aspirin 81 MG tablet    citalopram (CeleXA) 40 mg tablet    Continuous Blood Gluc Sensor (FreeStyle Ashley 2 Sensor) MISC    fenofibrate (TRICOR) 145 mg tablet    folic acid (FOLVITE) 1 mg tablet    gabapentin (NEURONTIN) 600 MG tablet    insulin degludec [employer]le [employer]eg[employer] FlexTouch) 100 units/mL injection pen    insulin lispro (HumaLOG) 100 units/mL injection pen    LORazepam (ATIVAN) 0 5 mg tablet    losartan (COZAAR) 100 MG tablet    mometasone-formoterol (Dulera) 200-5 MCG/ACT inhaler   multivitamin (THERAGRAN) TABS    obinutuzumab (GAZYVA) 1000 mg/40 mL SOLN    pantoprazole (PROTONIX) 40 mg tablet    predniSONE 5 mg tablet    zolpidem (AMBIEN) 5 mg tablet    acyclovir (ZOVIRAX) 400 MG tablet    albuterol (2 5 mg/3 mL) 0 083 % nebulizer solution    benzonatate (TESSALON) 200 MG capsule    glucose monitoring kit (FREESTYLE) monitoring kit    guaifenesin-codeine (GUAIFENESIN AC) 100-10 MG/5ML liquid    Ibrutinib 140 MG TABS    Insulin Pen Needle (BD Pen Needle Inez U/F) 32G X 4 MM MISC    Insulin Syringe-Needle U-100 30G X 1/2" 0 3 ML MISC    Lancets (FREESTYLE) lancets    naloxone (NARCAN) 4 mg/0 1 mL nasal spray    oxyCODONE (ROXICODONE) 10 MG TABS    sodium chloride, PF, 0 9 %    Current Facility-Administered Medications:     sodium chloride 0 9 % infusion, 125 mL/hr, Intravenous, Continuous, Continue from Pre-op at 06/20/22 1029    Allergies   Allergen Reactions    Heparin Other (See Comments)     Other reaction(s): Blood Disorders  clot    Macrolides And Ketolides Other (See Comments)     Interacts with other meds he is taking    Simvastatin Myalgia       Objective     /78   Pulse 71   Temp 99 °F (37 2 °C) (Tympanic)   Resp 16   Ht 6' 1" (1 854 m)   Wt 100 kg (220 lb 7 4 oz)   SpO2 98%   BMI 29 09 kg/m²       PHYSICAL EXAM    Gen: NAD  Head: NCAT  CV: RRR  CHEST: Clear  ABD: soft, NT/ND  EXT: no edema      ASSESSMENT/PLAN:  This is a 78y o  year old male here for EGD, and he is stable and optimized for his procedure

## 2022-06-20 NOTE — ANESTHESIA POSTPROCEDURE EVALUATION
Post-Op Assessment Note    CV Status:  Stable    Pain management: adequate     Mental Status:  Alert and awake   Hydration Status:  Euvolemic   PONV Controlled:  Controlled   Airway Patency:  Patent      Post Op Vitals Reviewed: Yes      Staff: CRNA         No complications documented      BP   130/78   Temp   97   Pulse  65   Resp   16   SpO2   97%

## 2022-06-27 DIAGNOSIS — Z00.00 PREVENTATIVE HEALTH CARE: ICD-10-CM

## 2022-06-27 RX ORDER — ACYCLOVIR 400 MG/1
TABLET ORAL
Qty: 60 TABLET | Refills: 11 | Status: SHIPPED | OUTPATIENT
Start: 2022-06-27 | End: 2023-06-27

## 2022-06-28 ENCOUNTER — HOSPITAL ENCOUNTER (OUTPATIENT)
Dept: INFUSION CENTER | Facility: CLINIC | Age: 67
Discharge: HOME | End: 2022-06-28
Payer: MEDICARE

## 2022-06-28 ENCOUNTER — TELEPHONE (OUTPATIENT)
Dept: ENDOCRINOLOGY | Facility: CLINIC | Age: 67
End: 2022-06-28

## 2022-06-28 VITALS
TEMPERATURE: 97.6 F | BODY MASS INDEX: 29.73 KG/M2 | RESPIRATION RATE: 18 BRPM | SYSTOLIC BLOOD PRESSURE: 157 MMHG | HEART RATE: 77 BPM | DIASTOLIC BLOOD PRESSURE: 75 MMHG | WEIGHT: 225.31 LBS

## 2022-06-28 DIAGNOSIS — D80.1 HYPOGAMMAGLOBULINEMIA, ACQUIRED (HCC): ICD-10-CM

## 2022-06-28 DIAGNOSIS — C91.10 CLL (CHRONIC LYMPHOCYTIC LEUKEMIA) (HCC): Primary | ICD-10-CM

## 2022-06-28 PROCEDURE — 96365 THER/PROPH/DIAG IV INF INIT: CPT

## 2022-06-28 PROCEDURE — 96366 THER/PROPH/DIAG IV INF ADDON: CPT

## 2022-06-28 PROCEDURE — 96375 TX/PRO/DX INJ NEW DRUG ADDON: CPT

## 2022-06-28 PROCEDURE — 96367 TX/PROPH/DG ADDL SEQ IV INF: CPT

## 2022-06-28 RX ORDER — ACETAMINOPHEN 325 MG/1
650 TABLET ORAL ONCE
Status: CANCELLED | OUTPATIENT
Start: 2022-07-26

## 2022-06-28 RX ORDER — ACETAMINOPHEN 325 MG/1
650 TABLET ORAL ONCE
Status: COMPLETED | OUTPATIENT
Start: 2022-06-28 | End: 2022-06-28

## 2022-06-28 RX ORDER — SODIUM CHLORIDE 9 MG/ML
20 INJECTION, SOLUTION INTRAVENOUS ONCE
Status: COMPLETED | OUTPATIENT
Start: 2022-06-28 | End: 2022-06-28

## 2022-06-28 RX ORDER — SODIUM CHLORIDE 9 MG/ML
20 INJECTION, SOLUTION INTRAVENOUS ONCE
Status: CANCELLED | OUTPATIENT
Start: 2022-07-26

## 2022-06-28 RX ADMIN — DIPHENHYDRAMINE HYDROCHLORIDE 12.5 MG: 50 INJECTION, SOLUTION INTRAMUSCULAR; INTRAVENOUS at 08:31

## 2022-06-28 RX ADMIN — HYDROCORTISONE SODIUM SUCCINATE 100 MG: 100 INJECTION, POWDER, FOR SOLUTION INTRAMUSCULAR; INTRAVENOUS at 08:28

## 2022-06-28 RX ADMIN — ACETAMINOPHEN 650 MG: 325 TABLET, FILM COATED ORAL at 08:28

## 2022-06-28 RX ADMIN — SODIUM CHLORIDE 20 ML/HR: 0.9 INJECTION, SOLUTION INTRAVENOUS at 08:28

## 2022-06-28 RX ADMIN — Medication 20 G: at 09:24

## 2022-06-29 ENCOUNTER — PATIENT MESSAGE (OUTPATIENT)
Dept: GASTROENTEROLOGY | Facility: CLINIC | Age: 67
End: 2022-06-29

## 2022-06-29 DIAGNOSIS — R13.19 ESOPHAGEAL DYSPHAGIA: ICD-10-CM

## 2022-06-29 DIAGNOSIS — E11.9 TYPE 2 DIABETES MELLITUS WITHOUT COMPLICATION, WITH LONG-TERM CURRENT USE OF INSULIN (HCC): ICD-10-CM

## 2022-06-29 DIAGNOSIS — Z79.4 TYPE 2 DIABETES MELLITUS WITHOUT COMPLICATION, WITH LONG-TERM CURRENT USE OF INSULIN (HCC): ICD-10-CM

## 2022-06-29 DIAGNOSIS — K21.9 GASTROESOPHAGEAL REFLUX DISEASE WITHOUT ESOPHAGITIS: ICD-10-CM

## 2022-06-29 RX ORDER — INSULIN DEGLUDEC INJECTION 100 U/ML
14 INJECTION, SOLUTION SUBCUTANEOUS
Qty: 15 ML | Refills: 1 | Status: SHIPPED | OUTPATIENT
Start: 2022-06-29

## 2022-06-29 RX ORDER — PANTOPRAZOLE SODIUM 40 MG/1
40 TABLET, DELAYED RELEASE ORAL 2 TIMES DAILY
Qty: 180 TABLET | Refills: 3 | Status: SHIPPED | OUTPATIENT
Start: 2022-06-29

## 2022-07-05 ENCOUNTER — TELEPHONE (OUTPATIENT)
Dept: PULMONOLOGY | Facility: CLINIC | Age: 67
End: 2022-07-05

## 2022-07-05 ENCOUNTER — OFFICE VISIT (OUTPATIENT)
Dept: HEMATOLOGY ONCOLOGY | Facility: CLINIC | Age: 67
End: 2022-07-05
Payer: MEDICARE

## 2022-07-05 VITALS
TEMPERATURE: 96.1 F | OXYGEN SATURATION: 96 % | RESPIRATION RATE: 16 BRPM | BODY MASS INDEX: 30.88 KG/M2 | DIASTOLIC BLOOD PRESSURE: 78 MMHG | WEIGHT: 233 LBS | HEART RATE: 68 BPM | SYSTOLIC BLOOD PRESSURE: 140 MMHG | HEIGHT: 73 IN

## 2022-07-05 DIAGNOSIS — C91.10 CHRONIC LYMPHOCYTIC LEUKEMIA (HCC): Primary | ICD-10-CM

## 2022-07-05 DIAGNOSIS — C91.10 CLL (CHRONIC LYMPHOCYTIC LEUKEMIA) (HCC): Primary | ICD-10-CM

## 2022-07-05 PROCEDURE — 99214 OFFICE O/P EST MOD 30 MIN: CPT | Performed by: PHYSICIAN ASSISTANT

## 2022-07-05 NOTE — TELEPHONE ENCOUNTER
Lvm for pt to schedule a 4m f/u @ our [address] office with Dr Suzanne Lauren or NAILA in September

## 2022-07-05 NOTE — PROGRESS NOTES
Hematology/Oncology Outpatient Follow-up  Maral Damico 78 y o  male 1955 [id number]    Date:  7/5/2022      Assessment and Plan:  1  Chronic lymphocytic leukemia [facility])  69-year-old male presents for follow-up regarding history of chronic lymphocytic leukemia  He is managed with Imbruvica 40 mg p o  daily  He receives IVIG every 28 days and Gazayva every 28 days as well  He has had worsening of arthralgias for the past 2 months which he has had in the past with imbruvica  He sometimes needs to take pain meds BID  He will keep us updated if this continue and will adjust the prescription  No change in therapy  Follow up in 3-4 months       HPI:  Oncology History   CLL (ch[facility] lymphocytic leukemia) ([facility] )   8/22/2017 -  Chemotherapy    chlorambucil (LEUKERAN), 0 5 mg/kg, Oral, Every 14 days, 6 of 6 cycles  obinutuzumab (GAZYVA) day 1 IVPB, 100 mg, Intravenous, Once, 1 of 1 cycle  obinutuzumab (GAZYVA) day 2 titrated infusion, 900 mg, Intravenous, Once, 1 of 1 cycle  obinutuzumab (GAZYVA) subsequent titrated infusion, 1,000 mg, Intravenous, Once, 46 of 53 cycles  Administration: 1,000 mg (5/21/2019), 1,000 mg (6/18/2019), 1,000 mg (7/16/2019), 1,000 mg (8/13/2019), 1,000 mg (9/10/2019), 1,000 mg (10/8/2019), 1,000 mg (11/5/2019), 1,000 mg (12/3/2019), 1,000 mg (12/31/2019), 1,000 mg (1/28/2020), 1,000 mg (2/25/2020), 1,000 mg (3/24/2020), 1,000 mg (4/21/2020), 1,000 mg (5/19/2020), 1,000 mg (6/16/2020), 1,000 mg (7/14/2020), 1,000 mg (8/11/2020), 1,000 mg (9/9/2020), 1,000 mg (10/6/2020), 1,000 mg (11/3/2020), 1,000 mg (12/1/2020), 1,000 mg (1/26/2021), 1,000 mg (12/29/2020), 1,000 mg (2/23/2021), 1,000 mg (3/23/2021), 1,000 mg (4/20/2021), 1,000 mg (5/18/2021), 1,000 mg (6/15/2021), 1,000 mg (7/13/2021), 1,000 mg (8/10/2021), 1,000 mg (9/7/2021), 1,000 mg (10/5/2021), 1,000 mg (11/2/2021), 1,000 mg (11/30/2021), 1,000 mg (12/28/2021), 1,000 mg (1/25/2022), 1,000 mg (2/22/2022), 1,000 mg (3/22/2022), 1,000 mg (5/17/2022), 1,000 mg (6/14/2022)     4/24/2018 Initial Diagnosis    CLL (chronic lymphocytic leukemia) [facility])       60-year-old male presents for follow-up regarding a longstanding history of CLL as well as hemolytic anemia related to his CLL      On 3/20/17 patient found to have hemoglobin of 6 2, ,000  He was admitted to [facility] for blood transfusion and plan to transfer to [address] [place] [address]  On 3/20/17 WBC count 195,000, hemoglobin 4 9, platelet count 65  Direct coomb's was positive with undetectable haptoglobin  He was given 2 units of PRBCs, Solu-Medrol 1 g ×3 days and IVIG 1 g/kg daily ×3 days  His hemoglobin continued to drop  Bone marrow biopsy on 3/21 showed marrow cellularity of greater than 95% almost replaced by small lymphocytes, lambda light chain restricted, CD20 positive, CD19 positive, CD23 positive partially expressing CD11c and CD25 and CD5 positive and negative for CD 79 and CD38  He was treated with single dose of chlorambucil to control his CLL   3/22 second dose of chlorambucil, also Rituxan 375 mg/M ² autoimmune hemolytic anemia  WBC continued to rise, 266,000  Patient initiated with Venetoclax 20 mg daily  Tumor lysis monitored every 8 hours; given aggressive hydration and Lasix and remained euvolemic  3/24-WBC dropped to 112,000  3/25- WBC 63,000  3/26-WBC 15,000, , platelet count 17,709  Patient became febrile, 101 3  The cefepime initiated Venetoclax held  3/28-patient fell from bed, CT head negative  ANC 1200, cefepime discontinued and Levaquin 75 mg daily started sliding 3/29 given second dose of rituximab 375 mg/m ²   3/30-WBC meg to 14 5 thousand, platelets 55,479, Venetoclax restarted 10 mg every other day  3/31 WBC 4 4, , platelet count 04,489  4/1-4/4: WBC maintained less than 10,000, ANC and platelet count meg  He was discharged on Venetoclax 10 mg every other day   He was instructed to discontinue simvastatin, Ranexa, aspirin, metoprolol 50 mg q  day, losartan 50 mg q  day, Plavix 75 mg q  day, folic acid 741 µg b i d , prednisone 60 mg, allopurinol  He was advised to continue Levaquin 750 mg daily for 10 days, Lexapro 10 mg daily, fenofibrate 50 mg daily, gabapentin 100 mg twice daily, Protonix 40 mg daily     Imaging studies performed at [facility]:  Patient had echocardiogram while inpatient at [facility] on 3/21  This study was unremarkable  CT chest abdomen pelvis without contrast on 3/24 showed stable pulmonary nodules, patchy groundglass densities of lower lobes previously identified and which may represent volume loss or early infiltrate  Persistent diffuse bronchial wall thickening suggesting acute/chronic bronchitis changes  No bronchiectasis  No masses  Stable lymphadenopathy of mediastinum  Stable axillary lymphadenopathy  Splenomegaly 23 9 cm  Extensive lymphadenopathy throughout the entire retroperitoneal, small bowel mesentery, and pelvis  Largest lymph node in right lower quadrant measured 4 6 x 4 8  Stable enlarged left para-aortic lymph node measuring 6 2 x 6 8   4/12/17: Patient received one dose of Rituxan on 4/7/17  Venetoclax 10 mg every other day 4/5/17 - 4/9/17  Venetoclax 10 mg every day beginning 4/10/17  Monitoring CBC 3 times per week  4/28/17: patient had follow-up appointment at [facility] with Dr Flori Mckeon  She recommended to slowly increase dose of veneteclax  Also, she recommended as he has had numerous treatments with Rituxan, if antibody directed therapy becomes indicated in the future recommendation was with [facility]  She will be seeing her on a p r n  Basis      July 2017- Hemolysis  Disease not under control with veneteclax   Started prednisone 60 mg daily, folic acid, IBRUTINIB 927 mg daily and Gazyva       Sept 2017- 9/18-9/20 patient was admitted due to uncontrolled hyperglycemia with new onset DM type II due to chronic prednisone use for CLL/hemolytic anemia; also found to have profound neutropenia  Ibrutinib dose was reduced to 280 mg daily      October 2017- 10/7/17 - 10/14/17 patient was admitted due to cellulitis on his scalp     Dec 2017- [place] decreased to 140 mg PO daily due to thrombocytopenia      4/23 - 4/27 patient was admitted due to listeria bacteremia  He was discharged on IV ampicillin  Echo negative for vegetations  Imbruvica and Bola Agrawal was on hold at that time  Repeat CBC on 5/14/18 showed normal ANC, and hemoglobin  Platelets adequate at 83K      10/18/18 the patient EGD   This showed distal esophageal stricture   This was dilated to 18 mm using a balloon dilator   He could not dilate any further due to bleeding secondary to thrombocytopenia    He had Evusheld on 2/17/22, scheduled for 2nd dose in Aug 2022  Interval history: has needed to take pain meds more often due to worsened arthralgias     ROS: Review of Systems   Constitutional: Negative for appetite change, chills and fatigue  HENT: Negative for trouble swallowing  Respiratory: Negative for cough and shortness of breath  Cardiovascular: Negative for chest pain, palpitations and leg swelling  Gastrointestinal: Negative for abdominal pain, constipation, diarrhea, nausea and vomiting  Genitourinary: Negative for difficulty urinating, dysuria and hematuria  Musculoskeletal: Positive for arthralgias (worse over last 2 months )  Skin: Negative  Neurological: Negative for dizziness, weakness, light-headedness and headaches  Hematological: Negative  Psychiatric/Behavioral: Negative        Past Medical History:   Diagnosis Date    Allergic     Anemia     Arthritis     CAD (coronary artery disease) 2004    [facility]     Leukemia    Cellulitis of scalp     CKD (chronic kidney disease) stage 3, GFR 30-59 ml/min [facility]     CLL (chronic lymphocytic leukemia) ([facility]     COPD (chronic obstructive pulmonary disease) [facility] )     Diabetes mellitus ([facility] )     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    Dyslipidemia     Edema extremities     Esophageal ulcer     H/O blood clots     Hemolytic anemia (HCC)     Hiatal hernia     History of TIA (transient ischemic attack)     Hyperlipidemia     Hypertension     Listeria infection 2018    Multiple gastric ulcers     My[facility] ) 2004    acute    Neutropenia (HCC)     Obese     Recurrent sinusitis     Thrombocytopenia (HCC)     Vertigo        Past Surgical History:   Procedure Laterality Date    ARTHROSCOPIC REPAIR ACL      lt knee    CARDIAC SURGERY      cardiac cath with stent    FOOT SURGERY      IR PORT CHECK  5/17/2019    KNEE ARTHROSCOPY      OTHER SURGICAL HISTORY      cardiac cath lesion 1, 1st adjunct treat device: stent    PORTACATH PLACEMENT      AK ESOPHAGOGASTRODUODENOSCOPY TRANSORAL DIAGNOSTIC N/A 10/5/2017    Procedure: ESOPHAGOGASTRODUODENOSCOPY (EGD) with bx;  Surgeon: America Cotter DO;  [facility]; Service: Gastroenterology    AK ESOPHAGOGASTRODUODENOSCOPY TRANSORAL DIAGNOSTIC N/A 3/8/2018    Procedure: ESOPHAGOGASTRODUODENOSCOPY (EGD) with biopsy;  Surgeon: America Cotter DO;  [facility]; Service: Gastroenterology    AK ESOPHAGOGASTRODUODENOSCOPY TRANSORAL DIAGNOSTIC N/A 6/20/2018    Procedure: ESOPHAGOGASTRODUODENOSCOPY (EGD) with Dilation;  Surgeon: America Cotter DO;  [facility]; Service: Gastroenterology    AK ESOPHAGOGASTRODUODENOSCOPY TRANSORAL DIAGNOSTIC N/A 10/18/2018    Procedure: ESOPHAGOGASTRODUODENOSCOPY (EGD) with dilation;  Surgeon: Lin Osorio MD;  [facility];   Service: Gastroenterology    AK ESOPHAGOSCOPY FLEXIBLE TRANSORAL WITH BIOPSY N/A 9/2/2016    Procedure: ESOPHAGOGASTRODUODENOSCOPY (EGD); ESOPHAGEAL DILATION; ESOPHAGEAL BIOPSY;  Surgeon: Marleny Giang MD;  [facility];  Service: Thoracic    TONSILLECTOMY      UPPER GASTROINTESTINAL ENDOSCOPY      x 6       Social History     Socioeconomic History    Marital status: /Civil [employer]     Spouse name: None    Number of children: None    Years of education: None    Highest education level: None   Occupational History    Occupation: [employer] [employer] Occupation: retired    Tobacco Use    Smoking status: Former Smoker     Packs/day: 2 00     Years: 33 00     Pack years: 66 00     Types: Cigarettes     Start date:      Quit date:      Years since quittin 5    Smokeless tobacco: Never Used   Vaping Use    Vaping Use: Never used   Substance and Sexual Activity    Alcohol use: Yes     Alcohol/week: 2 0 standard drinks     Types: 2 Cans of beer per week     Comment: social use as per Allscripts    Drug use: No    Sexual activity: None   Other Topics Concern    None   Social History Narrative    None     Social Determinants of Health     Financial Resource Strain: Not on file   Food Insecurity: No Food Insecurity    Worried About Running Out of Food in the Last Year: Never true    [place] of Food in the Last Year: Never true   Transportation Needs: No Transportation Needs    Lack of Transportation (Medical): No    Lack of Transportation (Non-Medical):  No   Physical Activity: Not on file   Stress: Not on file   Social Connections: Not on file   Intimate Partner Violence: Not on file   Housing Stability: Low Risk     Unable to Pay for Housing in the Last Year: No    Number of Places Lived in the Last Year: 1    Unstable Housing in the Last Year: No       Family History   Problem Relation Age of Onset    Leukemia Mother         chronic    Colon polyps Mother         sidmoid    Parkinsonism Father        Allergies   Allergen Reactions    Heparin Other (See Comments)     Other reaction(s): Blood Disorders  clot    Macrolides And Ketolides Other (See Comments)     Interacts with other meds he is taking    Simvastatin Myalgia         Current Outpatient Medications:     acyclovir (ZOVIRAX) 400 MG tablet, TAKE 1 TABLET TWICE A DAY, Disp: 60 tablet, Rfl: 11    albuterol (2 5 mg/3 mL) 0 083 % nebulizer solution, Take 3 mL (2 5 mg total) by nebulization every 6 (six) hours as needed for wheezing or shortness of breath, Disp: 180 mL, Rfl: 5    amLODIPine (NORVASC) 10 mg tablet, TAKE 1 TABLET DAILY, Disp: 90 tablet, Rfl: 5    aspirin 81 MG tablet, Take 81 mg by mouth every other day Every other day , Disp: , Rfl:     benzonatate (TESSALON) 200 MG capsule, Take 1 capsule (200 mg total) by mouth 3 (three) times a day as needed for cough, Disp: 20 capsule, Rfl: 0    citalopram (CeleXA) 40 mg tablet, TAKE 1 TABLET DAILY, Disp: 90 tablet, Rfl: 5    Continuous Blood Gluc Sensor (FreeStyle Ashley 2 Sensor) MISC, Change every 14 days, Disp: 2 each, Rfl: 3    fenofibrate (TRICOR) 145 mg tablet, TAKE 1 TABLET DAILY, Disp: 90 tablet, Rfl: 5    folic acid (FOLVITE) 1 mg tablet, Take 800 mcg by mouth daily , Disp: , Rfl:     gabapentin (NEURONTIN) 600 MG tablet, TAKE 1 TABLET DAILY, Disp: 90 tablet, Rfl: 3    glucose monitoring kit (FREESTYLE) monitoring kit, 1 each by Does not apply route as needed ( ) E 11 9, Disp: 1 each, Rfl: 0    guaifenesin-codeine (GUAIFENESIN AC) 100-10 MG/5ML liquid, Take 5 mL by mouth 3 (three) times a day as needed for cough, Disp: 236 mL, Rfl: 0    insulin degludec (Tresiba FlexTouch) 100 units/mL injection pen, Inject 14 Units under the skin daily at bedtime, Disp: 15 mL, Rfl: 1    insulin lispro (HumaLOG KwikPen) 100 units/mL injection pen, Use 5-17 units before meals as needed for steroid induced hyperglycemia, Disp: 15 mL, Rfl: 1    Insulin Pen Needle (BD Pen Needle Inez U/F) 32G X 4 MM MISC, Use 3 (three) times a day, Disp: 300 each, Rfl: 1    Insulin Syringe-Needle U-100 30G X 1/2" 0 3 ML MISC, by Does not apply route 2 (two) times a day, Disp: 100 each, Rfl: 6    Lancets (FREESTYLE) lancets, Use as instructed--test 2 times a day  E 11 9, Disp: 100 each, Rfl: 0    LORazepam (ATIVAN) 0 5 mg tablet, Take 1 tablet (0 5 mg total) by mouth daily as needed for anxiety, Disp: 30 tablet, Rfl: 0    losartan (COZAAR) 100 MG tablet, TAKE 1 TABLET DAILY, Disp: 90 tablet, Rfl: 5    mometasone-formoterol (Dulera) 200-5 MCG/ACT inhaler, Inhale 2 puffs 2 (two) times a day Rinse mouth after use , Disp: 13 g, Rfl: 1    multivitamin (THERAGRAN) TABS, Take 1 tablet by mouth daily, Disp: , Rfl:     naloxone (NARCAN) 4 mg/0 1 mL nasal spray, Administer 1 spray into a nostril  If no response after 2-3 minutes, give another dose in the other nostril using a new spray , Disp: 1 each, Rfl: 1    obinutuzumab (GAZYVA) 1000 mg/40 mL SOLN, Infuse into a venous catheter, Disp: , Rfl:     oxyCODONE (ROXICODONE) 10 MG TABS, Take 1 tablet (10 mg total) by mouth every 6 (six) hours as needed for moderate pain For ongoing pain control Max Daily Amount: 40 mg, Disp: 90 tablet, Rfl: 0    pantoprazole (PROTONIX) 40 mg tablet, Take 1 tablet (40 mg total) by mouth 2 (two) times a day, Disp: 180 tablet, Rfl: 3    predniSONE 5 mg tablet, TAKE 1 TABLET DAILY, Disp: 90 tablet, Rfl: 5    sodium chloride, PF, 0 9 %, 10 mL by Intracatheter route see administration instructions 10mL into port before and after ampicillin doses, Disp: , Rfl: 0    zolpidem (AMBIEN) 5 mg tablet, Take 1 tablet (5 mg total) by mouth daily at bedtime as needed for sleep, Disp: 30 tablet, Rfl: 0    Ibrutinib 140 MG TABS, Take 140 mg by mouth daily, Disp: 30 tablet, Rfl: 6      Physical Exam:  /78 (BP Location: Left arm, Patient Position: Sitting, Cuff Size: Adult)   Pulse 68   Temp (!) 96 1 °F (35 6 °C) (Temporal)   Resp 16   Ht 6' 1" (1 854 m)   Wt 106 kg (233 lb)   SpO2 96%   BMI 30 74 kg/m²     Physical Exam  Vitals reviewed  Constitutional:       General: He is not in acute distress  Appearance: He is well-developed  He is not ill-appearing  HENT:      Head: Normocephalic and atraumatic  Eyes:      General: No scleral icterus       Conjunctiva/sclera: Conjunctivae normal  Cardiovascular:      Rate and Rhythm: Normal rate and regular rhythm  Heart sounds: Normal heart sounds  No murmur heard  Pulmonary:      Effort: Pulmonary effort is normal  No respiratory distress  Breath sounds: Normal breath sounds  Abdominal:      Palpations: Abdomen is soft  Tenderness: There is no abdominal tenderness  Musculoskeletal:         General: No tenderness  Normal range of motion  Cervical back: Normal range of motion and neck supple  Right lower leg: No edema  Left lower leg: No edema  Lymphadenopathy:      Cervical: No cervical adenopathy  Skin:     General: Skin is warm and dry  Neurological:      Mental Status: He is alert and oriented to person, place, and time  Cranial Nerves: No cranial nerve deficit  Psychiatric:         Mood and Affect: Mood normal          Behavior: Behavior normal        Labs:  Lab Results   Component Value Date    WBC 4 91 06/14/2022    HGB 13 9 06/14/2022    HCT 43 0 06/14/2022    MCV 92 06/14/2022     (L) 06/14/2022     Lab Results   Component Value Date     12/29/2015    K 3 8 06/14/2022     06/14/2022    CO2 27 06/14/2022    ANIONGAP 8 12/29/2015    BUN 21 06/14/2022    CREATININE 1 25 06/14/2022    GLUCOSE 109 12/29/2015    GLUF 144 (H) 05/03/2022    CALCIUM 8 8 06/14/2022    CORRECTEDCA 10 0 03/23/2021    AST 22 06/14/2022    ALT 35 06/14/2022    ALKPHOS 71 06/14/2022    PROT 5 9 (L) 12/29/2015    BILITOT 0 7 12/29/2015    EGFR 59 06/14/2022     Patient voiced understanding and agreement in the above discussion  Aware to contact our office with questions/symptoms in the interim  This note has been generated by voice recognition software system  Therefore, there may be spelling, grammar, and or syntax errors  Please contact if questions arise

## 2022-07-06 RX ORDER — SODIUM CHLORIDE 9 MG/ML
20 INJECTION, SOLUTION INTRAVENOUS ONCE
Status: CANCELLED | OUTPATIENT
Start: 2022-07-12

## 2022-07-06 RX ORDER — ACETAMINOPHEN 325 MG/1
650 TABLET ORAL ONCE
Status: CANCELLED | OUTPATIENT
Start: 2022-07-12

## 2022-07-11 ENCOUNTER — TELEPHONE (OUTPATIENT)
Dept: FAMILY MEDICINE CLINIC | Facility: CLINIC | Age: 67
End: 2022-07-11

## 2022-07-12 ENCOUNTER — HOSPITAL ENCOUNTER (OUTPATIENT)
Dept: INFUSION CENTER | Facility: CLINIC | Age: 67
Discharge: HOME | End: 2022-07-12
Payer: MEDICARE

## 2022-07-12 VITALS
SYSTOLIC BLOOD PRESSURE: 158 MMHG | TEMPERATURE: 98.4 F | DIASTOLIC BLOOD PRESSURE: 81 MMHG | HEART RATE: 80 BPM | RESPIRATION RATE: 18 BRPM

## 2022-07-12 DIAGNOSIS — C91.10 CLL (CHRONIC LYMPHOCYTIC LEUKEMIA) (HCC): Primary | ICD-10-CM

## 2022-07-12 LAB
ALBUMIN SERPL BCP-MCNC: 3.8 G/DL (ref 3.5–5)
ALP SERPL-CCNC: 52 U/L (ref 46–116)
ALT SERPL W P-5'-P-CCNC: 36 U/L (ref 12–78)
ANION GAP SERPL CALCULATED.3IONS-SCNC: 9 MMOL/L (ref 4–13)
AST SERPL W P-5'-P-CCNC: 31 U/L (ref 5–45)
BASOPHILS # BLD MANUAL: 0 THOUSAND/UL (ref 0–0.1)
BASOPHILS NFR MAR MANUAL: 0 % (ref 0–1)
BILIRUB SERPL-MCNC: 0.29 MG/DL (ref 0.2–1)
BUN SERPL-MCNC: 20 MG/DL (ref 5–25)
CALCIUM SERPL-MCNC: 8.6 MG/DL (ref 8.3–10.1)
CHLORIDE SERPL-SCNC: 107 MMOL/L (ref 100–108)
CO2 SERPL-SCNC: 27 MMOL/L (ref 21–32)
CREAT SERPL-MCNC: 1.18 MG/DL (ref 0.6–1.3)
EOSINOPHIL # BLD MANUAL: 0.2 THOUSAND/UL (ref 0–0.4)
EOSINOPHIL NFR BLD MANUAL: 4 % (ref 0–6)
ERYTHROCYTE [DISTWIDTH] IN BLOOD BY AUTOMATED COUNT: 14.5 % (ref 11.6–15.1)
GFR SERPL CREATININE-BSD FRML MDRD: 63 ML/MIN/1.73SQ M
GLUCOSE SERPL-MCNC: 157 MG/DL (ref 65–140)
HCT VFR BLD AUTO: 43.6 % (ref 36.5–49.3)
HGB BLD-MCNC: 14 G/DL (ref 12–17)
IGG SERPL-MCNC: 521 MG/DL (ref 700–1600)
LG PLATELETS BLD QL SMEAR: PRESENT
LYMPHOCYTES # BLD AUTO: 0.59 THOUSAND/UL (ref 0.6–4.47)
LYMPHOCYTES # BLD AUTO: 12 % (ref 14–44)
MCH RBC QN AUTO: 30.8 PG (ref 26.8–34.3)
MCHC RBC AUTO-ENTMCNC: 32.1 G/DL (ref 31.4–37.4)
MCV RBC AUTO: 96 FL (ref 82–98)
MONOCYTES # BLD AUTO: 0.74 THOUSAND/UL (ref 0–1.22)
MONOCYTES NFR BLD: 15 % (ref 4–12)
NEUTROPHILS # BLD MANUAL: 3.39 THOUSAND/UL (ref 1.85–7.62)
NEUTS BAND NFR BLD MANUAL: 2 % (ref 0–8)
NEUTS SEG NFR BLD AUTO: 67 % (ref 43–75)
PLATELET # BLD AUTO: 151 THOUSANDS/UL (ref 149–390)
PLATELET BLD QL SMEAR: ADEQUATE
PMV BLD AUTO: 11.7 FL (ref 8.9–12.7)
POTASSIUM SERPL-SCNC: 4.2 MMOL/L (ref 3.5–5.3)
PROT SERPL-MCNC: 6.4 G/DL (ref 6.4–8.2)
RBC # BLD AUTO: 4.54 MILLION/UL (ref 3.88–5.62)
RBC MORPH BLD: NORMAL
RETICS # AUTO: ABNORMAL 10*3/UL (ref 14356–105094)
RETICS # CALC: 2.21 % (ref 0.37–1.87)
SODIUM SERPL-SCNC: 143 MMOL/L (ref 136–145)
WBC # BLD AUTO: 4.92 THOUSAND/UL (ref 4.31–10.16)

## 2022-07-12 PROCEDURE — 96413 CHEMO IV INFUSION 1 HR: CPT

## 2022-07-12 PROCEDURE — 85007 BL SMEAR W/DIFF WBC COUNT: CPT | Performed by: INTERNAL MEDICINE

## 2022-07-12 PROCEDURE — 85027 COMPLETE CBC AUTOMATED: CPT | Performed by: INTERNAL MEDICINE

## 2022-07-12 PROCEDURE — 96415 CHEMO IV INFUSION ADDL HR: CPT

## 2022-07-12 PROCEDURE — 96367 TX/PROPH/DG ADDL SEQ IV INF: CPT

## 2022-07-12 PROCEDURE — 80053 COMPREHEN METABOLIC PANEL: CPT | Performed by: INTERNAL MEDICINE

## 2022-07-12 PROCEDURE — 85045 AUTOMATED RETICULOCYTE COUNT: CPT | Performed by: INTERNAL MEDICINE

## 2022-07-12 PROCEDURE — 82784 ASSAY IGA/IGD/IGG/IGM EACH: CPT | Performed by: INTERNAL MEDICINE

## 2022-07-12 RX ORDER — SODIUM CHLORIDE 9 MG/ML
20 INJECTION, SOLUTION INTRAVENOUS ONCE
Status: COMPLETED | OUTPATIENT
Start: 2022-07-12 | End: 2022-07-12

## 2022-07-12 RX ORDER — ACETAMINOPHEN 325 MG/1
650 TABLET ORAL ONCE
Status: COMPLETED | OUTPATIENT
Start: 2022-07-12 | End: 2022-07-12

## 2022-07-12 RX ADMIN — DIPHENHYDRAMINE HYDROCHLORIDE 25 MG: 50 INJECTION, SOLUTION INTRAMUSCULAR; INTRAVENOUS at 08:22

## 2022-07-12 RX ADMIN — ACETAMINOPHEN 650 MG: 325 TABLET, FILM COATED ORAL at 08:21

## 2022-07-12 RX ADMIN — OBINUTUZUMAB 1000 MG: 1000 INJECTION, SOLUTION, CONCENTRATE INTRAVENOUS at 10:03

## 2022-07-12 RX ADMIN — SODIUM CHLORIDE 20 ML/HR: 0.9 INJECTION, SOLUTION INTRAVENOUS at 08:20

## 2022-07-12 RX ADMIN — DEXAMETHASONE SODIUM PHOSPHATE 20 MG: 10 INJECTION, SOLUTION INTRAMUSCULAR; INTRAVENOUS at 08:44

## 2022-07-12 NOTE — PROGRESS NOTES
Pt  Arrived to unit without complaint,  Blood work drawn per orders  Pt Tolerated Gazyva without adverse event  Future appointments reviewed  AVS declined

## 2022-07-13 NOTE — PROGRESS NOTES
Central labs drawn via port  Catheter maintenance performed per protocol without complications  Port was not flushed with heparin, as pt is allergic  Pt discharged in stable condition, and is aware to return tomorrow for infusion   Declined AVS  BMI: BMI (kg/m2): 18.9 (06-22-22 @ 19:07)  HbA1c: A1C with Estimated Average Glucose Result: 5.1 % (06-24-22 @ 07:55)    Glucose:   BP: 124/76 (07-13-22 @ 08:10) (124/76 - 137/74)  Lipid Panel: Date/Time: 06-23-22 @ 08:34  Cholesterol, Serum: 177  Direct LDL: --  HDL Cholesterol, Serum: 60  Total Cholesterol/HDL Ration Measurement: --  Triglycerides, Serum: 48

## 2022-07-20 DIAGNOSIS — C91.10 CLL (CHRONIC LYMPHOCYTIC LEUKEMIA) (HCC): ICD-10-CM

## 2022-07-20 RX ORDER — PREDNISONE 1 MG/1
TABLET ORAL
Qty: 90 TABLET | Refills: 5 | Status: SHIPPED | OUTPATIENT
Start: 2022-07-20

## 2022-07-24 DIAGNOSIS — D80.1 HYPOGAMMAGLOBULINEMIA, ACQUIRED (HCC): Primary | ICD-10-CM

## 2022-07-24 DIAGNOSIS — C91.10 CLL (CHRONIC LYMPHOCYTIC LEUKEMIA) (HCC): ICD-10-CM

## 2022-07-26 ENCOUNTER — HOSPITAL ENCOUNTER (OUTPATIENT)
Dept: INFUSION CENTER | Facility: CLINIC | Age: 67
Discharge: HOME | End: 2022-07-26
Payer: MEDICARE

## 2022-07-26 VITALS
SYSTOLIC BLOOD PRESSURE: 153 MMHG | BODY MASS INDEX: 30.37 KG/M2 | WEIGHT: 230.16 LBS | DIASTOLIC BLOOD PRESSURE: 81 MMHG | RESPIRATION RATE: 18 BRPM | TEMPERATURE: 97.9 F | HEART RATE: 70 BPM

## 2022-07-26 DIAGNOSIS — D80.1 HYPOGAMMAGLOBULINEMIA, ACQUIRED (HCC): ICD-10-CM

## 2022-07-26 DIAGNOSIS — C91.10 CLL (CHRONIC LYMPHOCYTIC LEUKEMIA) (HCC): Primary | ICD-10-CM

## 2022-07-26 PROCEDURE — 96366 THER/PROPH/DIAG IV INF ADDON: CPT

## 2022-07-26 PROCEDURE — 96375 TX/PRO/DX INJ NEW DRUG ADDON: CPT

## 2022-07-26 PROCEDURE — 96367 TX/PROPH/DG ADDL SEQ IV INF: CPT

## 2022-07-26 PROCEDURE — 96365 THER/PROPH/DIAG IV INF INIT: CPT

## 2022-07-26 RX ORDER — SODIUM CHLORIDE 9 MG/ML
20 INJECTION, SOLUTION INTRAVENOUS ONCE
Status: COMPLETED | OUTPATIENT
Start: 2022-07-26 | End: 2022-07-26

## 2022-07-26 RX ORDER — SODIUM CHLORIDE 9 MG/ML
20 INJECTION, SOLUTION INTRAVENOUS ONCE
Status: CANCELLED | OUTPATIENT
Start: 2022-08-23

## 2022-07-26 RX ORDER — ACETAMINOPHEN 325 MG/1
650 TABLET ORAL ONCE
Status: COMPLETED | OUTPATIENT
Start: 2022-07-26 | End: 2022-07-26

## 2022-07-26 RX ORDER — ACETAMINOPHEN 325 MG/1
650 TABLET ORAL ONCE
Status: CANCELLED | OUTPATIENT
Start: 2022-08-23

## 2022-07-26 RX ADMIN — HYDROCORTISONE SODIUM SUCCINATE 100 MG: 100 INJECTION, POWDER, FOR SOLUTION INTRAMUSCULAR; INTRAVENOUS at 08:19

## 2022-07-26 RX ADMIN — DIPHENHYDRAMINE HYDROCHLORIDE 12.5 MG: 50 INJECTION, SOLUTION INTRAMUSCULAR; INTRAVENOUS at 08:21

## 2022-07-26 RX ADMIN — Medication 20 G: at 09:17

## 2022-07-26 RX ADMIN — ACETAMINOPHEN 650 MG: 325 TABLET, FILM COATED ORAL at 08:18

## 2022-07-26 RX ADMIN — SODIUM CHLORIDE 20 ML/HR: 0.9 INJECTION, SOLUTION INTRAVENOUS at 08:15

## 2022-07-26 NOTE — PROGRESS NOTES
Pt arrived to unit without complaint  Pt tolerated IVIG infusion without incident  AVS declined, but aware of future appts  Pt left unit in stable condition

## 2022-08-01 ENCOUNTER — PATIENT OUTREACH (OUTPATIENT)
Dept: CASE MANAGEMENT | Facility: OTHER | Age: 67
End: 2022-08-01

## 2022-08-01 NOTE — TELEPHONE ENCOUNTER
Physical Therapy Treatment    Patient Name:  Regine Osorio   MRN:  [id number]    Recommendations:     Discharge Recommendations:  home health PT   Discharge Equipment Recommendations: bath bench, walker, rolling   Barriers to discharge: Decreased caregiver support (patient lives alone)    Assessment:     Regine Osorio is a 64 y.o. female admitted with a medical diagnosis of Cervical spondylosis.  She presents with the following impairments/functional limitations:   (weakness; impaired functional mobility; gait instability; impaired balance; impaired self care skills; decreased ROM; decreased upper extremity function; orthopedic precautions) .   Pt was motivated and cooperative with treatment session. Pt Progressing with PT Intervention. Pt Progressing with improving gait distance. Pt would continue to benefit from skilled PT to address overall functional mobility, goals , and to return to functional baseline.  Goals remain appropriate.    Rehab Prognosis: Good; patient would benefit from acute skilled PT services to address these deficits and reach maximum level of function.    Recent Surgery: Procedure(s) (LRB):  DISCECTOMY, SPINE, CERVICAL, ANTERIOR APPROACH, WITH FUSION C5-7 SPINEWAVE SNS: VOCAL CORDS/MOTORS/SSEP (N/A) 3 Days Post-Op    Plan:     During this hospitalization, patient to be seen daily to address the identified rehab impairments via gait training, therapeutic activities, therapeutic exercises, neuromuscular re-education and progress toward the following goals:    · Plan of Care Expires:  08/29/22    Subjective     Patient/Family Comments/goals: I might leave today  Pain/Comfort:  · Pain Rating 1:  (little pain but did not rate number)  · Location - Side 1: Bilateral  · Location - Orientation 1: generalized  · Location 1: neck  · Pain Addressed 1: Pre-medicate for activity, Reposition, Distraction      Objective:     Communicated with RN prior to session.  Patient found up in chair with cervical  Please take 2 tab   Daily with food collar upon PT entry to room.     General Precautions: Standard, fall   Orthopedic Precautions:spinal precautions   Braces: Aspen collar  Respiratory Status: Room air     Functional Mobility:  · Transfers:     · Sit to Stand:  supervision with rolling walker  · Gait: pt amb with  ft with (S) with vcs for  forward gaze and upright posture   ·       AM-PAC 6 CLICK MOBILITY  Turning over in bed (including adjusting bedclothes, sheets and blankets)?: 3  Sitting down on and standing up from a chair with arms (e.g., wheelchair, bedside commode, etc.): 3  Moving from lying on back to sitting on the side of the bed?: 3  Moving to and from a bed to a chair (including a wheelchair)?: 3  Need to walk in hospital room?: 3  Climbing 3-5 steps with a railing?: 3  Basic Mobility Total Score: 18       Therapeutic Activities and Exercises:  Therapist provided instruction and educated of  patient on progress, safety,d/c,PT POC,   proper body mechanics, energy conservation, and fall prevention strategies during tasks listed above, on the effects of prolonged immobility and the importance of performing OOB activity and exercises to promote healing and reduce recovery time  Updated white board with appropriate PT mobility information for medical team notification     Donned an extra gown     Pt encouraged to ambulate in hallways 3x/day with nursing or family assistance to improve endurance.     Pt safe to ambulate in hallway with RN or PCT assistance      Call nursing/pct to transfer to chair/use bathroom. Pt stated understanding      Bedside table in front of patient and area set up for function, convenience, and safety. RN aware of patient's mobility needs and status. Questions/concerns addressed within PTA scope of practice; patient  with no further questions. Time was provided for active listening, discussion of health disposition, and discussion of safe discharge. Pt?verbalized?agreement .    Patient left up in chair with all  lines intact, call button in reach and nsg] notified..    GOALS:   Multidisciplinary Problems     Physical Therapy Goals        Problem: Physical Therapy    Goal Priority Disciplines Outcome Goal Variances Interventions   Physical Therapy Goal     PT, PT/OT Ongoing, Progressing     Description: Goals to be met by: 2022     Patient will increase functional independence with mobility by performin. Supine to sit with Modified [place] while maintaining cervical spine precautions  2. Sit to supine with Modified [place] while maintaining cervical spine precautions  3. Rolling to Left and Right with Modified [place].  4. Sit to stand transfer with Modified [place] with rolling walker  5. Bed to chair transfer with Modified [place] using Rolling Walker  6. Gait  x 400 feet with Modified [place] using Rolling Walker.   7. Patient verbalizes appropriate 3 out 3 spinal precautions correctly - Met on 2022                       Time Tracking:     PT Received On: 22  PT Start Time: 810     PT Stop Time: 833  PT Total Time (min): 23 min     Billable Minutes: Gait Training 13 and Therapeutic Activity 10    Treatment Type: Treatment  PT/PTA: PTA     PTA Visit Number: 1     2022

## 2022-08-01 NOTE — PROGRESS NOTES
OSW reviewed chart and had briefly said hello to pt at the end of his infusion on 7/26  He stated he is doing well  OSW will close to ongoing outreach at this time  Should pt express further needs, a referral to OSW team can be placed at that time

## 2022-08-02 RX ORDER — ACETAMINOPHEN 325 MG/1
650 TABLET ORAL ONCE
Status: CANCELLED | OUTPATIENT
Start: 2022-08-09

## 2022-08-02 RX ORDER — SODIUM CHLORIDE 9 MG/ML
20 INJECTION, SOLUTION INTRAVENOUS ONCE
Status: CANCELLED | OUTPATIENT
Start: 2022-08-09

## 2022-08-08 ENCOUNTER — TELEPHONE (OUTPATIENT)
Dept: ENDOCRINOLOGY | Facility: CLINIC | Age: 67
End: 2022-08-08

## 2022-08-09 ENCOUNTER — HOSPITAL ENCOUNTER (OUTPATIENT)
Dept: INFUSION CENTER | Facility: CLINIC | Age: 67
Discharge: HOME | End: 2022-08-09
Payer: MEDICARE

## 2022-08-09 VITALS
HEART RATE: 65 BPM | BODY MASS INDEX: 30.15 KG/M2 | TEMPERATURE: 98.3 F | SYSTOLIC BLOOD PRESSURE: 166 MMHG | RESPIRATION RATE: 18 BRPM | HEIGHT: 73 IN | WEIGHT: 227.51 LBS | DIASTOLIC BLOOD PRESSURE: 82 MMHG

## 2022-08-09 DIAGNOSIS — C91.10 CLL (CHRONIC LYMPHOCYTIC LEUKEMIA) (HCC): Primary | ICD-10-CM

## 2022-08-09 LAB
ALBUMIN SERPL BCP-MCNC: 3.6 G/DL (ref 3.5–5)
ALP SERPL-CCNC: 55 U/L (ref 46–116)
ALT SERPL W P-5'-P-CCNC: 39 U/L (ref 12–78)
ANION GAP SERPL CALCULATED.3IONS-SCNC: 12 MMOL/L (ref 4–13)
AST SERPL W P-5'-P-CCNC: 32 U/L (ref 5–45)
BASOPHILS # BLD AUTO: 0.04 THOUSANDS/ΜL (ref 0–0.1)
BASOPHILS NFR BLD AUTO: 1 % (ref 0–1)
BILIRUB SERPL-MCNC: 0.4 MG/DL (ref 0.2–1)
BUN SERPL-MCNC: 19 MG/DL (ref 5–25)
CALCIUM SERPL-MCNC: 8.9 MG/DL (ref 8.3–10.1)
CHLORIDE SERPL-SCNC: 107 MMOL/L (ref 96–108)
CO2 SERPL-SCNC: 24 MMOL/L (ref 21–32)
CREAT SERPL-MCNC: 1.26 MG/DL (ref 0.6–1.3)
EOSINOPHIL # BLD AUTO: 0.26 THOUSAND/ΜL (ref 0–0.61)
EOSINOPHIL NFR BLD AUTO: 6 % (ref 0–6)
ERYTHROCYTE [DISTWIDTH] IN BLOOD BY AUTOMATED COUNT: 13.9 % (ref 11.6–15.1)
GFR SERPL CREATININE-BSD FRML MDRD: 58 ML/MIN/1.73SQ M
GLUCOSE SERPL-MCNC: 259 MG/DL (ref 65–140)
HCT VFR BLD AUTO: 42.9 % (ref 36.5–49.3)
HGB BLD-MCNC: 14 G/DL (ref 12–17)
IGG SERPL-MCNC: 512 MG/DL (ref 700–1600)
IMM GRANULOCYTES # BLD AUTO: 0.09 THOUSAND/UL (ref 0–0.2)
IMM GRANULOCYTES NFR BLD AUTO: 2 % (ref 0–2)
LYMPHOCYTES # BLD AUTO: 0.42 THOUSANDS/ΜL (ref 0.6–4.47)
LYMPHOCYTES NFR BLD AUTO: 10 % (ref 14–44)
MCH RBC QN AUTO: 30.7 PG (ref 26.8–34.3)
MCHC RBC AUTO-ENTMCNC: 32.6 G/DL (ref 31.4–37.4)
MCV RBC AUTO: 94 FL (ref 82–98)
MONOCYTES # BLD AUTO: 0.53 THOUSAND/ΜL (ref 0.17–1.22)
MONOCYTES NFR BLD AUTO: 13 % (ref 4–12)
NEUTROPHILS # BLD AUTO: 2.74 THOUSANDS/ΜL (ref 1.85–7.62)
NEUTS SEG NFR BLD AUTO: 68 % (ref 43–75)
NRBC BLD AUTO-RTO: 0 /100 WBCS
PLATELET # BLD AUTO: 139 THOUSANDS/UL (ref 149–390)
PMV BLD AUTO: 11.4 FL (ref 8.9–12.7)
POTASSIUM SERPL-SCNC: 3.8 MMOL/L (ref 3.5–5.3)
PROT SERPL-MCNC: 6.4 G/DL (ref 6.4–8.4)
RBC # BLD AUTO: 4.56 MILLION/UL (ref 3.88–5.62)
RETICS # AUTO: NORMAL 10*3/UL (ref 14356–105094)
RETICS # CALC: 1.78 % (ref 0.37–1.87)
SODIUM SERPL-SCNC: 143 MMOL/L (ref 135–147)
WBC # BLD AUTO: 4.08 THOUSAND/UL (ref 4.31–10.16)

## 2022-08-09 PROCEDURE — 96367 TX/PROPH/DG ADDL SEQ IV INF: CPT

## 2022-08-09 PROCEDURE — 85025 COMPLETE CBC W/AUTO DIFF WBC: CPT | Performed by: INTERNAL MEDICINE

## 2022-08-09 PROCEDURE — 96413 CHEMO IV INFUSION 1 HR: CPT

## 2022-08-09 PROCEDURE — 85045 AUTOMATED RETICULOCYTE COUNT: CPT | Performed by: INTERNAL MEDICINE

## 2022-08-09 PROCEDURE — 96415 CHEMO IV INFUSION ADDL HR: CPT

## 2022-08-09 PROCEDURE — 82784 ASSAY IGA/IGD/IGG/IGM EACH: CPT | Performed by: INTERNAL MEDICINE

## 2022-08-09 PROCEDURE — 80053 COMPREHEN METABOLIC PANEL: CPT | Performed by: INTERNAL MEDICINE

## 2022-08-09 RX ORDER — ACETAMINOPHEN 325 MG/1
650 TABLET ORAL ONCE
Status: COMPLETED | OUTPATIENT
Start: 2022-08-09 | End: 2022-08-09

## 2022-08-09 RX ORDER — SODIUM CHLORIDE 9 MG/ML
20 INJECTION, SOLUTION INTRAVENOUS ONCE
Status: COMPLETED | OUTPATIENT
Start: 2022-08-09 | End: 2022-08-09

## 2022-08-09 RX ADMIN — ACETAMINOPHEN 650 MG: 325 TABLET, FILM COATED ORAL at 08:25

## 2022-08-09 RX ADMIN — DIPHENHYDRAMINE HYDROCHLORIDE 25 MG: 50 INJECTION, SOLUTION INTRAMUSCULAR; INTRAVENOUS at 08:50

## 2022-08-09 RX ADMIN — OBINUTUZUMAB 1000 MG: 1000 INJECTION, SOLUTION, CONCENTRATE INTRAVENOUS at 10:04

## 2022-08-09 RX ADMIN — SODIUM CHLORIDE 20 ML/HR: 0.9 INJECTION, SOLUTION INTRAVENOUS at 08:25

## 2022-08-09 RX ADMIN — DEXAMETHASONE SODIUM PHOSPHATE 20 MG: 10 INJECTION, SOLUTION INTRAMUSCULAR; INTRAVENOUS at 08:25

## 2022-08-09 NOTE — PROGRESS NOTES
Patient arrived to unit without complaint  Patient tolerated Gazyva infusion without incident  AVS declined and patient aware of next appointment  Patient left in stable condition

## 2022-08-10 DIAGNOSIS — J44.9 CHRONIC OBSTRUCTIVE PULMONARY DISEASE, UNSPECIFIED COPD TYPE (HCC): ICD-10-CM

## 2022-08-11 DIAGNOSIS — J44.9 CHRONIC OBSTRUCTIVE PULMONARY DISEASE, UNSPECIFIED COPD TYPE (HCC): ICD-10-CM

## 2022-08-11 RX ORDER — ALBUTEROL SULFATE 2.5 MG/3ML
2.5 SOLUTION RESPIRATORY (INHALATION) EVERY 6 HOURS PRN
Qty: 180 ML | Refills: 5 | Status: SHIPPED | OUTPATIENT
Start: 2022-08-11

## 2022-08-17 ENCOUNTER — TELEPHONE (OUTPATIENT)
Dept: INFUSION CENTER | Facility: CLINIC | Age: 67
End: 2022-08-17

## 2022-08-17 NOTE — TELEPHONE ENCOUNTER
Message  Received: Today  Gt [facility] Pharmacist  [facility]  This patient also had evusheld in May- 5/5/22  Please reschedule 6 months from then

## 2022-08-17 NOTE — TELEPHONE ENCOUNTER
Pt due for next dose of EVUSHELD on or after 11/5/22  Called pt to notify appt on 8/18 not needed and to r/s  No answer and voicemail left for pt requesting call back

## 2022-08-18 ENCOUNTER — HOSPITAL ENCOUNTER (OUTPATIENT)
Dept: INFUSION CENTER | Facility: CLINIC | Age: 67
End: 2022-08-18

## 2022-08-23 ENCOUNTER — HOSPITAL ENCOUNTER (OUTPATIENT)
Dept: INFUSION CENTER | Facility: CLINIC | Age: 67
Discharge: HOME | End: 2022-08-23
Payer: MEDICARE

## 2022-08-23 VITALS
RESPIRATION RATE: 16 BRPM | DIASTOLIC BLOOD PRESSURE: 83 MMHG | HEART RATE: 78 BPM | TEMPERATURE: 97.9 F | BODY MASS INDEX: 30.31 KG/M2 | WEIGHT: 229.72 LBS | SYSTOLIC BLOOD PRESSURE: 160 MMHG

## 2022-08-23 DIAGNOSIS — C91.10 CLL (CHRONIC LYMPHOCYTIC LEUKEMIA) (HCC): Primary | ICD-10-CM

## 2022-08-23 DIAGNOSIS — D80.1 HYPOGAMMAGLOBULINEMIA, ACQUIRED (HCC): ICD-10-CM

## 2022-08-23 PROCEDURE — 96367 TX/PROPH/DG ADDL SEQ IV INF: CPT

## 2022-08-23 PROCEDURE — 96366 THER/PROPH/DIAG IV INF ADDON: CPT

## 2022-08-23 PROCEDURE — 96375 TX/PRO/DX INJ NEW DRUG ADDON: CPT

## 2022-08-23 PROCEDURE — 96365 THER/PROPH/DIAG IV INF INIT: CPT

## 2022-08-23 RX ORDER — SODIUM CHLORIDE 9 MG/ML
20 INJECTION, SOLUTION INTRAVENOUS ONCE
Status: CANCELLED | OUTPATIENT
Start: 2022-09-20

## 2022-08-23 RX ORDER — ACETAMINOPHEN 325 MG/1
650 TABLET ORAL ONCE
Status: CANCELLED | OUTPATIENT
Start: 2022-09-20

## 2022-08-23 RX ORDER — ACETAMINOPHEN 325 MG/1
650 TABLET ORAL ONCE
Status: COMPLETED | OUTPATIENT
Start: 2022-08-23 | End: 2022-08-23

## 2022-08-23 RX ORDER — SODIUM CHLORIDE 9 MG/ML
20 INJECTION, SOLUTION INTRAVENOUS ONCE
Status: COMPLETED | OUTPATIENT
Start: 2022-08-23 | End: 2022-08-23

## 2022-08-23 RX ADMIN — ACETAMINOPHEN 650 MG: 325 TABLET, FILM COATED ORAL at 08:23

## 2022-08-23 RX ADMIN — SODIUM CHLORIDE 20 ML/HR: 0.9 INJECTION, SOLUTION INTRAVENOUS at 08:23

## 2022-08-23 RX ADMIN — DIPHENHYDRAMINE HYDROCHLORIDE 12.5 MG: 50 INJECTION, SOLUTION INTRAMUSCULAR; INTRAVENOUS at 08:26

## 2022-08-23 RX ADMIN — HYDROCORTISONE SODIUM SUCCINATE 100 MG: 100 INJECTION, POWDER, FOR SOLUTION INTRAMUSCULAR; INTRAVENOUS at 08:24

## 2022-08-23 RX ADMIN — Medication 20 G: at 08:53

## 2022-08-30 RX ORDER — SODIUM CHLORIDE 9 MG/ML
20 INJECTION, SOLUTION INTRAVENOUS ONCE
Status: CANCELLED | OUTPATIENT
Start: 2022-09-06

## 2022-08-30 RX ORDER — ACETAMINOPHEN 325 MG/1
650 TABLET ORAL ONCE
Status: CANCELLED | OUTPATIENT
Start: 2022-09-06

## 2022-09-01 LAB
LEFT EYE DIABETIC RETINOPATHY: NORMAL
RIGHT EYE DIABETIC RETINOPATHY: NORMAL

## 2022-09-02 DIAGNOSIS — F41.9 ANXIETY DISORDER, UNSPECIFIED TYPE: ICD-10-CM

## 2022-09-02 RX ORDER — LORAZEPAM 0.5 MG/1
TABLET ORAL
Qty: 30 TABLET | Refills: 0 | Status: SHIPPED | OUTPATIENT
Start: 2022-09-02

## 2022-09-06 ENCOUNTER — HOSPITAL ENCOUNTER (OUTPATIENT)
Dept: INFUSION CENTER | Facility: CLINIC | Age: 67
Discharge: HOME | End: 2022-09-06
Payer: MEDICARE

## 2022-09-06 VITALS
HEIGHT: 73 IN | RESPIRATION RATE: 2 BRPM | WEIGHT: 231.48 LBS | BODY MASS INDEX: 30.68 KG/M2 | HEART RATE: 77 BPM | TEMPERATURE: 97.7 F

## 2022-09-06 DIAGNOSIS — C91.10 CLL (CHRONIC LYMPHOCYTIC LEUKEMIA) (HCC): Primary | ICD-10-CM

## 2022-09-06 LAB
ALBUMIN SERPL BCP-MCNC: 3.7 G/DL (ref 3.5–5)
ALP SERPL-CCNC: 61 U/L (ref 46–116)
ALT SERPL W P-5'-P-CCNC: 38 U/L (ref 12–78)
ANION GAP SERPL CALCULATED.3IONS-SCNC: 9 MMOL/L (ref 4–13)
AST SERPL W P-5'-P-CCNC: 24 U/L (ref 5–45)
BASOPHILS # BLD MANUAL: 0 THOUSAND/UL (ref 0–0.1)
BASOPHILS NFR MAR MANUAL: 0 % (ref 0–1)
BILIRUB SERPL-MCNC: 0.32 MG/DL (ref 0.2–1)
BUN SERPL-MCNC: 20 MG/DL (ref 5–25)
CALCIUM SERPL-MCNC: 9.1 MG/DL (ref 8.3–10.1)
CHLORIDE SERPL-SCNC: 104 MMOL/L (ref 96–108)
CO2 SERPL-SCNC: 26 MMOL/L (ref 21–32)
CREAT SERPL-MCNC: 1.14 MG/DL (ref 0.6–1.3)
EOSINOPHIL # BLD MANUAL: 0.23 THOUSAND/UL (ref 0–0.4)
EOSINOPHIL NFR BLD MANUAL: 5 % (ref 0–6)
ERYTHROCYTE [DISTWIDTH] IN BLOOD BY AUTOMATED COUNT: 13.7 % (ref 11.6–15.1)
GFR SERPL CREATININE-BSD FRML MDRD: 65 ML/MIN/1.73SQ M
GLUCOSE SERPL-MCNC: 267 MG/DL (ref 65–140)
HCT VFR BLD AUTO: 43.6 % (ref 36.5–49.3)
HGB BLD-MCNC: 14.1 G/DL (ref 12–17)
IGG SERPL-MCNC: 512 MG/DL (ref 700–1600)
LYMPHOCYTES # BLD AUTO: 0.63 THOUSAND/UL (ref 0.6–4.47)
LYMPHOCYTES # BLD AUTO: 14 % (ref 14–44)
MCH RBC QN AUTO: 30.3 PG (ref 26.8–34.3)
MCHC RBC AUTO-ENTMCNC: 32.3 G/DL (ref 31.4–37.4)
MCV RBC AUTO: 94 FL (ref 82–98)
MONOCYTES # BLD AUTO: 0.36 THOUSAND/UL (ref 0–1.22)
MONOCYTES NFR BLD: 8 % (ref 4–12)
NEUTROPHILS # BLD MANUAL: 3.29 THOUSAND/UL (ref 1.85–7.62)
NEUTS SEG NFR BLD AUTO: 73 % (ref 43–75)
PLATELET # BLD AUTO: 141 THOUSANDS/UL (ref 149–390)
PLATELET BLD QL SMEAR: ABNORMAL
PMV BLD AUTO: 11.5 FL (ref 8.9–12.7)
POTASSIUM SERPL-SCNC: 4 MMOL/L (ref 3.5–5.3)
PROT SERPL-MCNC: 6.6 G/DL (ref 6.4–8.4)
RBC # BLD AUTO: 4.65 MILLION/UL (ref 3.88–5.62)
RBC MORPH BLD: NORMAL
RETICS # AUTO: ABNORMAL 10*3/UL (ref 14356–105094)
RETICS # CALC: 2.11 % (ref 0.37–1.87)
SODIUM SERPL-SCNC: 139 MMOL/L (ref 135–147)
WBC # BLD AUTO: 4.51 THOUSAND/UL (ref 4.31–10.16)

## 2022-09-06 PROCEDURE — 96413 CHEMO IV INFUSION 1 HR: CPT

## 2022-09-06 PROCEDURE — 85027 COMPLETE CBC AUTOMATED: CPT | Performed by: INTERNAL MEDICINE

## 2022-09-06 PROCEDURE — 85007 BL SMEAR W/DIFF WBC COUNT: CPT | Performed by: INTERNAL MEDICINE

## 2022-09-06 PROCEDURE — 85045 AUTOMATED RETICULOCYTE COUNT: CPT | Performed by: INTERNAL MEDICINE

## 2022-09-06 PROCEDURE — 80053 COMPREHEN METABOLIC PANEL: CPT | Performed by: INTERNAL MEDICINE

## 2022-09-06 PROCEDURE — 96367 TX/PROPH/DG ADDL SEQ IV INF: CPT

## 2022-09-06 PROCEDURE — 96415 CHEMO IV INFUSION ADDL HR: CPT

## 2022-09-06 PROCEDURE — 82784 ASSAY IGA/IGD/IGG/IGM EACH: CPT | Performed by: INTERNAL MEDICINE

## 2022-09-06 RX ORDER — ACETAMINOPHEN 325 MG/1
650 TABLET ORAL ONCE
Status: COMPLETED | OUTPATIENT
Start: 2022-09-06 | End: 2022-09-06

## 2022-09-06 RX ORDER — SODIUM CHLORIDE 9 MG/ML
20 INJECTION, SOLUTION INTRAVENOUS ONCE
Status: COMPLETED | OUTPATIENT
Start: 2022-09-06 | End: 2022-09-06

## 2022-09-06 RX ADMIN — SODIUM CHLORIDE 20 ML/HR: 0.9 INJECTION, SOLUTION INTRAVENOUS at 08:27

## 2022-09-06 RX ADMIN — DIPHENHYDRAMINE HYDROCHLORIDE 25 MG: 50 INJECTION, SOLUTION INTRAMUSCULAR; INTRAVENOUS at 08:57

## 2022-09-06 RX ADMIN — OBINUTUZUMAB 1000 MG: 1000 INJECTION, SOLUTION, CONCENTRATE INTRAVENOUS at 09:58

## 2022-09-06 RX ADMIN — ACETAMINOPHEN 650 MG: 325 TABLET, FILM COATED ORAL at 08:35

## 2022-09-06 RX ADMIN — DEXAMETHASONE SODIUM PHOSPHATE 20 MG: 10 INJECTION, SOLUTION INTRAMUSCULAR; INTRAVENOUS at 08:32

## 2022-09-06 NOTE — PROGRESS NOTES
Patient arrived on un it for [facility]  Denies any present issues/concerns  Tolerated infusion without incident  Aware of next appointment   Declined AVS

## 2022-09-14 ENCOUNTER — TELEPHONE (OUTPATIENT)
Dept: ADMINISTRATIVE | Facility: OTHER | Age: 67
End: 2022-09-14

## 2022-09-14 NOTE — TELEPHONE ENCOUNTER
Upon review of the In Basket request we were able to locate, review, and update the patient chart as requested for Diabetic Eye Exam     Any additional questions or concerns should be emailed to the Practice Liaisons via [email or web address] email, please do not reply via In Basket      Thank you  Harsi Hartman

## 2022-09-20 ENCOUNTER — RA CDI HCC (OUTPATIENT)
Dept: OTHER | Facility: HOSPITAL | Age: 67
End: 2022-09-20

## 2022-09-21 ENCOUNTER — HOSPITAL ENCOUNTER (OUTPATIENT)
Dept: INFUSION CENTER | Facility: CLINIC | Age: 67
Discharge: HOME | End: 2022-09-21
Payer: MEDICARE

## 2022-09-21 VITALS
TEMPERATURE: 97.1 F | DIASTOLIC BLOOD PRESSURE: 81 MMHG | RESPIRATION RATE: 18 BRPM | HEART RATE: 80 BPM | SYSTOLIC BLOOD PRESSURE: 148 MMHG | BODY MASS INDEX: 30.2 KG/M2 | WEIGHT: 228.84 LBS

## 2022-09-21 DIAGNOSIS — C91.10 CLL (CHRONIC LYMPHOCYTIC LEUKEMIA) (HCC): Primary | ICD-10-CM

## 2022-09-21 DIAGNOSIS — D80.1 HYPOGAMMAGLOBULINEMIA, ACQUIRED (HCC): ICD-10-CM

## 2022-09-21 PROCEDURE — 96365 THER/PROPH/DIAG IV INF INIT: CPT

## 2022-09-21 PROCEDURE — 96366 THER/PROPH/DIAG IV INF ADDON: CPT

## 2022-09-21 PROCEDURE — 96367 TX/PROPH/DG ADDL SEQ IV INF: CPT

## 2022-09-21 PROCEDURE — 96375 TX/PRO/DX INJ NEW DRUG ADDON: CPT

## 2022-09-21 RX ORDER — ACETAMINOPHEN 325 MG/1
650 TABLET ORAL ONCE
Status: COMPLETED | OUTPATIENT
Start: 2022-09-21 | End: 2022-09-21

## 2022-09-21 RX ORDER — ACETAMINOPHEN 325 MG/1
650 TABLET ORAL ONCE
Status: CANCELLED | OUTPATIENT
Start: 2022-10-18

## 2022-09-21 RX ORDER — SODIUM CHLORIDE 9 MG/ML
20 INJECTION, SOLUTION INTRAVENOUS ONCE
Status: COMPLETED | OUTPATIENT
Start: 2022-09-21 | End: 2022-09-21

## 2022-09-21 RX ORDER — SODIUM CHLORIDE 9 MG/ML
20 INJECTION, SOLUTION INTRAVENOUS ONCE
Status: CANCELLED | OUTPATIENT
Start: 2022-10-18

## 2022-09-21 RX ADMIN — SODIUM CHLORIDE 20 ML/HR: 0.9 INJECTION, SOLUTION INTRAVENOUS at 09:25

## 2022-09-21 RX ADMIN — DIPHENHYDRAMINE HYDROCHLORIDE 12.5 MG: 50 INJECTION, SOLUTION INTRAMUSCULAR; INTRAVENOUS at 09:29

## 2022-09-21 RX ADMIN — HYDROCORTISONE SODIUM SUCCINATE 100 MG: 100 INJECTION, POWDER, FOR SOLUTION INTRAMUSCULAR; INTRAVENOUS at 09:26

## 2022-09-21 RX ADMIN — ACETAMINOPHEN 650 MG: 325 TABLET, FILM COATED ORAL at 09:26

## 2022-09-21 RX ADMIN — Medication 20 G: at 10:12

## 2022-09-26 ENCOUNTER — OFFICE VISIT (OUTPATIENT)
Dept: FAMILY MEDICINE CLINIC | Facility: CLINIC | Age: 67
End: 2022-09-26
Payer: MEDICARE

## 2022-09-26 VITALS
WEIGHT: 235.2 LBS | RESPIRATION RATE: 18 BRPM | TEMPERATURE: 98.1 F | BODY MASS INDEX: 31.17 KG/M2 | HEART RATE: 78 BPM | SYSTOLIC BLOOD PRESSURE: 140 MMHG | HEIGHT: 73 IN | DIASTOLIC BLOOD PRESSURE: 80 MMHG | OXYGEN SATURATION: 94 %

## 2022-09-26 DIAGNOSIS — E11.42 TYPE 2 DIABETES MELLITUS WITH DIABETIC POLYNEUROPATHY, WITH LONG-TERM CURRENT USE OF INSULIN (HCC): ICD-10-CM

## 2022-09-26 DIAGNOSIS — K21.00 GASTROESOPHAGEAL REFLUX DISEASE WITH ESOPHAGITIS, UNSPECIFIED WHETHER HEMORRHAGE: ICD-10-CM

## 2022-09-26 DIAGNOSIS — Z79.4 TYPE 2 DIABETES MELLITUS WITH DIABETIC POLYNEUROPATHY, WITH LONG-TERM CURRENT USE OF INSULIN (HCC): ICD-10-CM

## 2022-09-26 DIAGNOSIS — J44.9 CHRONIC OBSTRUCTIVE PULMONARY DISEASE, UNSPECIFIED COPD TYPE (HCC): ICD-10-CM

## 2022-09-26 DIAGNOSIS — C91.10 CLL (CHRONIC LYMPHOCYTIC LEUKEMIA) (HCC): Primary | ICD-10-CM

## 2022-09-26 DIAGNOSIS — I10 PRIMARY HYPERTENSION: ICD-10-CM

## 2022-09-26 DIAGNOSIS — E78.2 MIXED HYPERLIPIDEMIA: ICD-10-CM

## 2022-09-26 DIAGNOSIS — N18.9 CHRONIC KIDNEY DISEASE, UNSPECIFIED CKD STAGE: ICD-10-CM

## 2022-09-26 PROCEDURE — 99215 OFFICE O/P EST HI 40 MIN: CPT | Performed by: FAMILY MEDICINE

## 2022-09-26 NOTE — PROGRESS NOTES
Assessment/Plan:   1  CLL (chronic lymphocytic leukemia) [facility])  Patient following Hematology/Oncology regularly  He has been on infusion treatments with Gazayva, IVIG and his oral Imbruvica  He has been tolerating this treatment regularly  He denies adverse reactions with medications  Continue with regular follow-up with [facility] oncology  2  Type 2 diabetes mellitus with diabetic polyneuropathy, with long-term current use of insulin [facility])  Following with Endocrinology regularly  At this time, his A1c is not been as well controlled  He has been taking his medications regularly  He has been taking his [place] as well as his Humalog  Will continue with his current treatment  3  Primary hypertension  Mildly elevated  Patient does follow with Cardiology regularly  Continue with routine home monitoring as well as his current treatment with amlodipine as well as his losartan  4  Mixed hyperlipidemia  Patient was advised to recheck fasting blood work  Continue with a strict low-fat/low-cholesterol as well as current treatment with fenofibrate  5  Chronic obstructive pulmonary disease, unspecified COPD type ([facility] [address] )  Patient denies any recent exacerbations  He does follow with pulmonology regularly  He does use albuterol p r n  He has also been on treatment with guaifenesin as well as Dulera  Continue with his current treatment  6  Gastroesophageal reflux disease with esophagitis, unspecified whether hemorrhage  Stable  Continue with dietary trigger avoidance as well as current treatment with pantoprazole  Imbruvica          There are no diagnoses linked to this encounter  Subjective:       Chief Complaint   Patient presents with    Follow-up     6 month       Patient ID: Norbert Sterling is a 75 y o  male presents today for a follow-up on his chronic conditions  He has CLL, type 2 diabetes, hypertension, dyslipidemia, COPD, GERD, as well as ED    He has been taking his medications regularly  He denies adverse reactions with medications  He does follow with his hematologist regularly  He also follows with endocrinology, Cardiology, pulmonology as well as Gastroenterology regularly  HPI    Review of Systems   Constitutional: Negative for activity change, chills, fatigue and fever  HENT: Negative for congestion, ear pain, sinus pressure and sore throat  Eyes: Negative for redness, itching and visual disturbance  Respiratory: Negative for cough and shortness of breath  Cardiovascular: Negative for chest pain and palpitations  Gastrointestinal: Negative for abdominal pain, diarrhea and nausea  Endocrine: Negative for cold intolerance and heat intolerance  Genitourinary: Negative for dysuria, flank pain and frequency  Musculoskeletal: Negative for arthralgias, back pain, gait problem and myalgias  Skin: Negative for color change  Allergic/Immunologic: Negative for environmental allergies  Neurological: Negative for dizziness, numbness and headaches  Psychiatric/Behavioral: Negative for behavioral problems and sleep disturbance  The following portions of the patient's history were reviewed and updated as appropriate : past family history, past medical history, past social history and past surgical history      Current Outpatient Medications:     acyclovir (ZOVIRAX) 400 MG tablet, TAKE 1 TABLET TWICE A DAY, Disp: 60 tablet, Rfl: 11    albuterol (2 5 mg/3 mL) 0 083 % nebulizer solution, Take 3 mL (2 5 mg total) by nebulization every 6 (six) hours as needed for wheezing or shortness of breath, Disp: 180 mL, Rfl: 5    amLODIPine (NORVASC) 10 mg tablet, TAKE 1 TABLET DAILY, Disp: 90 tablet, Rfl: 5    aspirin 81 MG tablet, Take 81 mg by mouth every other day Every other day , Disp: , Rfl:     benzonatate (TESSALON) 200 MG capsule, Take 1 capsule (200 mg total) by mouth 3 (three) times a day as needed for cough, Disp: 20 capsule, Rfl: 0    citalopram (CeleXA) 40 mg tablet, TAKE 1 TABLET DAILY, Disp: 90 tablet, Rfl: 5    Continuous Blood Gluc Sensor (FreeStyle Ashley 2 Sensor) MISC, Change every 14 days, Disp: 2 each, Rfl: 3    fenofibrate (TRICOR) 145 mg tablet, TAKE 1 TABLET DAILY, Disp: 90 tablet, Rfl: 5    folic acid (FOLVITE) 1 mg tablet, Take 800 mcg by mouth daily , Disp: , Rfl:     gabapentin (NEURONTIN) 600 MG tablet, TAKE 1 TABLET DAILY, Disp: 90 tablet, Rfl: 3    glucose monitoring kit (FREESTYLE) monitoring kit, 1 each by Does not apply route as needed ( ) E 11 9, Disp: 1 each, Rfl: 0    guaifenesin-codeine (GUAIFENESIN AC) 100-10 MG/5ML liquid, Take 5 mL by mouth 3 (three) times a day as needed for cough, Disp: 236 mL, Rfl: 0    insulin degludec (Tresiba FlexTouch) 100 units/mL injection pen, Inject 14 Units under the skin daily at bedtime, Disp: 15 mL, Rfl: 1    insulin lispro (HumaLOG KwikPen) 100 units/mL injection pen, Use 5-17 units before meals as needed for steroid induced hyperglycemia, Disp: 15 mL, Rfl: 1    Insulin Pen Needle (BD Pen Needle Inez U/F) 32G X 4 MM MISC, Use 3 (three) times a day, Disp: 300 each, Rfl: 1    Insulin Syringe-Needle U-100 30G X 1/2" 0 3 ML MISC, by Does not apply route 2 (two) times a day, Disp: 100 each, Rfl: 6    Lancets (FREESTYLE) lancets, Use as instructed--test 2 times a day  E 11 9, Disp: 100 each, Rfl: 0    LORazepam (ATIVAN) 0 5 mg tablet, Take 1 tablet by mouth daily as needed for anxiety, Disp: 30 tablet, Rfl: 0    losartan (COZAAR) 100 MG tablet, TAKE 1 TABLET DAILY, Disp: 90 tablet, Rfl: 5    mometasone-formoterol (Dulera) 200-5 MCG/ACT inhaler, Inhale 2 puffs 2 (two) times a day Rinse mouth after use , Disp: 13 g, Rfl: 1    multivitamin (THERAGRAN) TABS, Take 1 tablet by mouth daily, Disp: , Rfl:     naloxone (NARCAN) 4 mg/0 1 mL nasal spray, Administer 1 spray into a nostril   If no response after 2-3 minutes, give another dose in the other nostril using a new spray , Disp: 1 each, Rfl: 1   obinutuzumab (GAZYVA) 1000 mg/40 mL SOLN, Infuse into a venous catheter, Disp: , Rfl:     oxyCODONE (ROXICODONE) 10 MG TABS, Take 1 tablet (10 mg total) by mouth every 6 (six) hours as needed for moderate pain For ongoing pain control Max Daily Amount: 40 mg, Disp: 90 tablet, Rfl: 0    pantoprazole (PROTONIX) 40 mg tablet, Take 1 tablet (40 mg total) by mouth 2 (two) times a day, Disp: 180 tablet, Rfl: 3    predniSONE 5 mg tablet, TAKE 1 TABLET DAILY, Disp: 90 tablet, Rfl: 5    sodium chloride, PF, 0 9 %, 10 mL by Intracatheter route see administration instructions 10mL into port before and after ampicillin doses, Disp: , Rfl: 0    zolpidem (AMBIEN) 5 mg tablet, Take 1 tablet (5 mg total) by mouth daily at bedtime as needed for sleep, Disp: 30 tablet, Rfl: 0    Ibrutinib 140 MG TABS, Take 140 mg by mouth daily, Disp: 30 tablet, Rfl: 6         Objective:         Vitals:    09/26/22 0829   BP: 140/80   Pulse: 78   Resp: 18   Temp: 98 1 °F (36 7 °C)   TempSrc: Tympanic   SpO2: 94%   Weight: 107 kg (235 lb 3 2 oz)   Height: 6' 1" (1 854 m)     Physical Exam  Vitals reviewed  Constitutional:       Appearance: He is well-developed  HENT:      Head: Normocephalic and atraumatic  Nose: Nose normal       Mouth/Throat:      Pharynx: No oropharyngeal exudate  Eyes:      General: No scleral icterus  Right eye: No discharge  Left eye: No discharge  Pupils: Pupils are equal, round, and reactive to light  Neck:      Trachea: No tracheal deviation  Cardiovascular:      Rate and Rhythm: Normal rate and regular rhythm  Pulses:           Dorsalis pedis pulses are 2+ on the right side and 2+ on the left side  Posterior tibial pulses are 2+ on the right side and 2+ on the left side  Heart sounds: Normal heart sounds  No murmur heard  No friction rub  No gallop  Pulmonary:      Effort: Pulmonary effort is normal  No respiratory distress        Breath sounds: Normal breath sounds  No wheezing or rales  Abdominal:      General: Bowel sounds are normal  There is no distension  Palpations: Abdomen is soft  Tenderness: There is no abdominal tenderness  There is no guarding or rebound  Musculoskeletal:         General: Normal range of motion  Cervical back: Normal range of motion and neck supple  Lymphadenopathy:      Head:      Right side of head: No submental or submandibular adenopathy  Left side of head: No submental or submandibular adenopathy  Cervical: No cervical adenopathy  Right cervical: No superficial, deep or posterior cervical adenopathy  Left cervical: No superficial, deep or posterior cervical adenopathy  Skin:     General: Skin is warm and dry  Findings: No erythema  Neurological:      Mental Status: He is alert and oriented to person, place, and time  Cranial Nerves: No cranial nerve deficit  Sensory: No sensory deficit  Psychiatric:         Mood and Affect: Mood is not anxious or depressed  Speech: Speech normal          Behavior: Behavior normal          Thought Content:  Thought content normal          Judgment: Judgment normal

## 2022-09-27 RX ORDER — SODIUM CHLORIDE 9 MG/ML
20 INJECTION, SOLUTION INTRAVENOUS ONCE
Status: CANCELLED | OUTPATIENT
Start: 2022-10-04

## 2022-09-27 RX ORDER — ACETAMINOPHEN 325 MG/1
650 TABLET ORAL ONCE
Status: CANCELLED | OUTPATIENT
Start: 2022-10-04

## 2022-10-04 ENCOUNTER — HOSPITAL ENCOUNTER (OUTPATIENT)
Dept: INFUSION CENTER | Facility: CLINIC | Age: 67
Discharge: HOME | End: 2022-10-04
Payer: MEDICARE

## 2022-10-04 VITALS
WEIGHT: 232.37 LBS | HEART RATE: 70 BPM | TEMPERATURE: 98 F | SYSTOLIC BLOOD PRESSURE: 154 MMHG | BODY MASS INDEX: 30.66 KG/M2 | DIASTOLIC BLOOD PRESSURE: 80 MMHG | RESPIRATION RATE: 16 BRPM

## 2022-10-04 DIAGNOSIS — C91.10 CLL (CHRONIC LYMPHOCYTIC LEUKEMIA) (HCC): Primary | ICD-10-CM

## 2022-10-04 LAB
ALBUMIN SERPL BCP-MCNC: 3.6 G/DL (ref 3.5–5)
ALP SERPL-CCNC: 66 U/L (ref 46–116)
ALT SERPL W P-5'-P-CCNC: 38 U/L (ref 12–78)
ANION GAP SERPL CALCULATED.3IONS-SCNC: 7 MMOL/L (ref 4–13)
AST SERPL W P-5'-P-CCNC: 28 U/L (ref 5–45)
BASOPHILS # BLD MANUAL: 0.16 THOUSAND/UL (ref 0–0.1)
BASOPHILS NFR MAR MANUAL: 3 % (ref 0–1)
BILIRUB SERPL-MCNC: 0.34 MG/DL (ref 0.2–1)
BUN SERPL-MCNC: 22 MG/DL (ref 5–25)
CALCIUM SERPL-MCNC: 9.1 MG/DL (ref 8.3–10.1)
CHLORIDE SERPL-SCNC: 106 MMOL/L (ref 96–108)
CO2 SERPL-SCNC: 27 MMOL/L (ref 21–32)
CREAT SERPL-MCNC: 1.18 MG/DL (ref 0.6–1.3)
EOSINOPHIL # BLD MANUAL: 0.11 THOUSAND/UL (ref 0–0.4)
EOSINOPHIL NFR BLD MANUAL: 2 % (ref 0–6)
ERYTHROCYTE [DISTWIDTH] IN BLOOD BY AUTOMATED COUNT: 13.4 % (ref 11.6–15.1)
GFR SERPL CREATININE-BSD FRML MDRD: 63 ML/MIN/1.73SQ M
GLUCOSE SERPL-MCNC: 217 MG/DL (ref 65–140)
HCT VFR BLD AUTO: 43.5 % (ref 36.5–49.3)
HGB BLD-MCNC: 14.3 G/DL (ref 12–17)
IGG SERPL-MCNC: 552 MG/DL (ref 700–1600)
LYMPHOCYTES # BLD AUTO: 0.86 THOUSAND/UL (ref 0.6–4.47)
LYMPHOCYTES # BLD AUTO: 16 % (ref 14–44)
MCH RBC QN AUTO: 31.4 PG (ref 26.8–34.3)
MCHC RBC AUTO-ENTMCNC: 32.9 G/DL (ref 31.4–37.4)
MCV RBC AUTO: 96 FL (ref 82–98)
MONOCYTES # BLD AUTO: 0.32 THOUSAND/UL (ref 0–1.22)
MONOCYTES NFR BLD: 6 % (ref 4–12)
NEUTROPHILS # BLD MANUAL: 3.92 THOUSAND/UL (ref 1.85–7.62)
NEUTS BAND NFR BLD MANUAL: 4 % (ref 0–8)
NEUTS SEG NFR BLD AUTO: 69 % (ref 43–75)
PLATELET # BLD AUTO: 154 THOUSANDS/UL (ref 149–390)
PLATELET BLD QL SMEAR: ADEQUATE
PMV BLD AUTO: 11.9 FL (ref 8.9–12.7)
POTASSIUM SERPL-SCNC: 4.1 MMOL/L (ref 3.5–5.3)
PROT SERPL-MCNC: 6.5 G/DL (ref 6.4–8.4)
RBC # BLD AUTO: 4.55 MILLION/UL (ref 3.88–5.62)
RBC MORPH BLD: NORMAL
RETICS # AUTO: ABNORMAL 10*3/UL (ref 14356–105094)
RETICS # CALC: 1.98 % (ref 0.37–1.87)
SODIUM SERPL-SCNC: 140 MMOL/L (ref 135–147)
WBC # BLD AUTO: 5.37 THOUSAND/UL (ref 4.31–10.16)

## 2022-10-04 PROCEDURE — 96413 CHEMO IV INFUSION 1 HR: CPT

## 2022-10-04 PROCEDURE — 82784 ASSAY IGA/IGD/IGG/IGM EACH: CPT | Performed by: INTERNAL MEDICINE

## 2022-10-04 PROCEDURE — 85027 COMPLETE CBC AUTOMATED: CPT | Performed by: INTERNAL MEDICINE

## 2022-10-04 PROCEDURE — 96367 TX/PROPH/DG ADDL SEQ IV INF: CPT

## 2022-10-04 PROCEDURE — 85045 AUTOMATED RETICULOCYTE COUNT: CPT | Performed by: INTERNAL MEDICINE

## 2022-10-04 PROCEDURE — 85007 BL SMEAR W/DIFF WBC COUNT: CPT | Performed by: INTERNAL MEDICINE

## 2022-10-04 PROCEDURE — 80053 COMPREHEN METABOLIC PANEL: CPT | Performed by: INTERNAL MEDICINE

## 2022-10-04 PROCEDURE — 96415 CHEMO IV INFUSION ADDL HR: CPT

## 2022-10-04 RX ORDER — ACETAMINOPHEN 325 MG/1
650 TABLET ORAL ONCE
Status: COMPLETED | OUTPATIENT
Start: 2022-10-04 | End: 2022-10-04

## 2022-10-04 RX ORDER — SODIUM CHLORIDE 9 MG/ML
20 INJECTION, SOLUTION INTRAVENOUS ONCE
Status: COMPLETED | OUTPATIENT
Start: 2022-10-04 | End: 2022-10-04

## 2022-10-04 RX ADMIN — DIPHENHYDRAMINE HYDROCHLORIDE 25 MG: 50 INJECTION, SOLUTION INTRAMUSCULAR; INTRAVENOUS at 08:56

## 2022-10-04 RX ADMIN — SODIUM CHLORIDE 20 ML/HR: 0.9 INJECTION, SOLUTION INTRAVENOUS at 08:30

## 2022-10-04 RX ADMIN — ACETAMINOPHEN 650 MG: 325 TABLET, FILM COATED ORAL at 08:34

## 2022-10-04 RX ADMIN — OBINUTUZUMAB 1000 MG: 1000 INJECTION, SOLUTION, CONCENTRATE INTRAVENOUS at 09:47

## 2022-10-04 RX ADMIN — DEXAMETHASONE SODIUM PHOSPHATE 20 MG: 10 INJECTION, SOLUTION INTRAMUSCULAR; INTRAVENOUS at 08:30

## 2022-10-04 NOTE — PROGRESS NOTES
Patient arrived on unit for [facility]  Labs drawn as ordered  Tolerated Gadzyva infusion without incident  Aware of next appointment   Declined AVS

## 2022-10-11 PROBLEM — J18.9 MULTIFOCAL PNEUMONIA: Status: RESOLVED | Noted: 2022-04-12 | Resolved: 2022-10-11

## 2022-10-17 ENCOUNTER — TELEPHONE (OUTPATIENT)
Dept: ENDOCRINOLOGY | Facility: CLINIC | Age: 67
End: 2022-10-17

## 2022-10-17 NOTE — PROGRESS NOTES
CC:  Silvano Mitchell Pt woke up with severe pain at 0700, states it is throbbing strongly; pt given fiurocet and oxy IR and it brought the pain down to a 2/10

## 2022-10-18 ENCOUNTER — HOSPITAL ENCOUNTER (OUTPATIENT)
Dept: INFUSION CENTER | Facility: CLINIC | Age: 67
Discharge: HOME | End: 2022-10-18
Payer: MEDICARE

## 2022-10-18 VITALS
WEIGHT: 231.92 LBS | RESPIRATION RATE: 18 BRPM | HEART RATE: 80 BPM | DIASTOLIC BLOOD PRESSURE: 85 MMHG | TEMPERATURE: 97.9 F | BODY MASS INDEX: 30.6 KG/M2 | SYSTOLIC BLOOD PRESSURE: 164 MMHG

## 2022-10-18 DIAGNOSIS — C91.10 CLL (CHRONIC LYMPHOCYTIC LEUKEMIA) (HCC): Primary | ICD-10-CM

## 2022-10-18 DIAGNOSIS — D80.1 HYPOGAMMAGLOBULINEMIA, ACQUIRED (HCC): ICD-10-CM

## 2022-10-18 PROCEDURE — 96366 THER/PROPH/DIAG IV INF ADDON: CPT

## 2022-10-18 PROCEDURE — 96367 TX/PROPH/DG ADDL SEQ IV INF: CPT

## 2022-10-18 PROCEDURE — 96375 TX/PRO/DX INJ NEW DRUG ADDON: CPT

## 2022-10-18 PROCEDURE — 96365 THER/PROPH/DIAG IV INF INIT: CPT

## 2022-10-18 RX ORDER — ACETAMINOPHEN 325 MG/1
650 TABLET ORAL ONCE
Status: COMPLETED | OUTPATIENT
Start: 2022-10-18 | End: 2022-10-18

## 2022-10-18 RX ORDER — ACETAMINOPHEN 325 MG/1
650 TABLET ORAL ONCE
OUTPATIENT
Start: 2022-10-19

## 2022-10-18 RX ORDER — SODIUM CHLORIDE 9 MG/ML
20 INJECTION, SOLUTION INTRAVENOUS ONCE
OUTPATIENT
Start: 2022-10-19

## 2022-10-18 RX ORDER — SODIUM CHLORIDE 9 MG/ML
20 INJECTION, SOLUTION INTRAVENOUS ONCE
Status: COMPLETED | OUTPATIENT
Start: 2022-10-18 | End: 2022-10-18

## 2022-10-18 RX ADMIN — SODIUM CHLORIDE 20 ML/HR: 0.9 INJECTION, SOLUTION INTRAVENOUS at 08:16

## 2022-10-18 RX ADMIN — Medication 20 G: at 09:07

## 2022-10-18 RX ADMIN — ACETAMINOPHEN 650 MG: 325 TABLET ORAL at 08:20

## 2022-10-18 RX ADMIN — HYDROCORTISONE SODIUM SUCCINATE 100 MG: 100 INJECTION, POWDER, FOR SOLUTION INTRAMUSCULAR; INTRAVENOUS at 08:22

## 2022-10-18 RX ADMIN — DIPHENHYDRAMINE HYDROCHLORIDE 12.5 MG: 50 INJECTION, SOLUTION INTRAMUSCULAR; INTRAVENOUS at 08:23

## 2022-10-24 DIAGNOSIS — I10 ESSENTIAL HYPERTENSION: ICD-10-CM

## 2022-10-24 RX ORDER — AMLODIPINE BESYLATE 10 MG/1
10 TABLET ORAL DAILY
Qty: 90 TABLET | Refills: 1 | Status: SHIPPED | OUTPATIENT
Start: 2022-10-24

## 2022-10-25 LAB
DME PARACHUTE DELIVERY DATE REQUESTED: NORMAL
DME PARACHUTE ITEM DESCRIPTION: NORMAL
DME PARACHUTE ORDER STATUS: NORMAL
DME PARACHUTE SUPPLIER NAME: NORMAL
DME PARACHUTE SUPPLIER PHONE: NORMAL

## 2022-10-25 RX ORDER — ACETAMINOPHEN 325 MG/1
650 TABLET ORAL ONCE
Status: CANCELLED | OUTPATIENT
Start: 2022-11-01

## 2022-10-25 RX ORDER — SODIUM CHLORIDE 9 MG/ML
20 INJECTION, SOLUTION INTRAVENOUS ONCE
Status: CANCELLED | OUTPATIENT
Start: 2022-11-01

## 2022-10-26 ENCOUNTER — OFFICE VISIT (OUTPATIENT)
Dept: ENDOCRINOLOGY | Facility: CLINIC | Age: 67
End: 2022-10-26
Payer: MEDICARE

## 2022-10-26 VITALS
HEIGHT: 73 IN | SYSTOLIC BLOOD PRESSURE: 150 MMHG | HEART RATE: 75 BPM | BODY MASS INDEX: 30.83 KG/M2 | WEIGHT: 232.6 LBS | DIASTOLIC BLOOD PRESSURE: 78 MMHG

## 2022-10-26 DIAGNOSIS — E09.9 STEROID-INDUCED DIABETES MELLITUS, SEQUELA (HCC): ICD-10-CM

## 2022-10-26 DIAGNOSIS — E11.9 TYPE 2 DIABETES MELLITUS WITHOUT COMPLICATION, WITH LONG-TERM CURRENT USE OF INSULIN (HCC): Primary | ICD-10-CM

## 2022-10-26 DIAGNOSIS — Z79.4 TYPE 2 DIABETES MELLITUS WITHOUT COMPLICATION, WITH LONG-TERM CURRENT USE OF INSULIN (HCC): Primary | ICD-10-CM

## 2022-10-26 DIAGNOSIS — T38.0X5S STEROID-INDUCED DIABETES MELLITUS, SEQUELA (HCC): ICD-10-CM

## 2022-10-26 LAB — SL AMB POCT HEMOGLOBIN AIC: 7 (ref ?–6.5)

## 2022-10-26 PROCEDURE — 99214 OFFICE O/P EST MOD 30 MIN: CPT | Performed by: NURSE PRACTITIONER

## 2022-10-26 PROCEDURE — 83036 HEMOGLOBIN GLYCOSYLATED A1C: CPT | Performed by: NURSE PRACTITIONER

## 2022-10-26 PROCEDURE — 95251 CONT GLUC MNTR ANALYSIS I&R: CPT | Performed by: NURSE PRACTITIONER

## 2022-10-26 NOTE — PATIENT INSTRUCTIONS
Humalog increased pre-meal dose to 8 units   Continue Humalog 10 units before steroid infusion bi-weekly  Continue Tresiba 14 units daily, on days after steroid infusion for 1 dose increase to 15 units daily  Follow up in 3 months

## 2022-10-26 NOTE — ASSESSMENT & PLAN NOTE
Hemoglobin A1c today was 7 0% with post-meal hyperglycemia and some hyperglycemia the day following his steroid injections:   1) Continue Tresiba at 14 units, will increase for one dose the day of steroid infusion to 15 units and then will return to 14 units following  2) Increase humalog to 8 units before meals, currently taking 7 units

## 2022-10-26 NOTE — PROGRESS NOTES
Established Patient Progress Note      CC:  Diabetes mellitus, steroid induced    History of Present Illness:   Jagdish Batista is a 75 y o  male with a history of steroid induced diabetes  Receives chemo every 2 weeks with steroid infusion and is on prednisone 5 mg daily  Last visit 4/25/2022  Last A1c 7 0% on 10/26/2022  Denies complications of diabetes mellitus  Denies recent illness or hospitalizations  Denies recent severe hypoglycemic or severe hyperglycemic episodes  Denies any issues with his current regimen  Home glucose monitoring: are performed regularly with CGM  Current regimen:   Tresiba 14 units daily  Humalog 10 units before chemo  Humalog 7 units before meals    CGM Interpretation  Peter A Guillerminaiades   Device used [employer] 2  Home use   Indication: Steroid infusion  More than 72 hours of data was reviewed  Report to be scanned to chart  Date Range: 10/13/2022-10/26/2022  Analysis of data:   Average Glucose: 168 mg/dL  Coefficient of Variation: 23 0%   Time in Target Range: 64%   Time Above Range: 36%   Time Below Range: 0%   Interpretation of data: Running higher after meals despite increasing to Humalog to 7 units before meals and runs higher for approximately 36 hours after steroid infusion       Last Eye Exam: 9/1/2022, No indication of DR  Last Foot Exam: 12/17/2021    Has hypertension: Taking losartan and amlodipine  Patient Active Problem List   Diagnosis   • CAD (coronary artery disease)   • CLL (chronic lymphocytic leukemia) ([facility] [address] )   • Hyperlipidemia   • Hypertension   • History of TIA (transient ischemic attack)   • Esophagitis, reflux   • Candida esophagitis (HCC)   • Chronic kidney disease   • H/O blood clots   • Hemolytic anemia (HCC)   • 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)   • Anemia, chronic disease   • Chronic lymphocytic leukemia (HCC)   • Leukopenia due to antineoplastic chemotherapy ([facility] [address] )   • Hyperglycemia   • Cellulitis of head or scalp   • Pancytopenia (HCC)   • Headache around the eyes   • Gastroesophageal reflux disease without esophagitis   • Colitis, acute   • Listeria bacteremia   • Thrombocytopenia (HCC)   • Diabetes mellitus ([place] [address] )   • 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[facility])   • Chronic right shoulder pain   • 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[place] [address] )   • Ulcer of esophagus without bleeding   • Esophageal stricture   • Dysphagia   • Esophageal dysphagia   • Acute bursitis of right shoulder   • Hypogammaglobulinemia, acquired (HCC)   • Autoimmune hemolytic anemia (HCC)   • Right upper quadrant abdominal pain   • Age related osteoporosis   • Chronic obstructive pulmonary disease (HCC)   • Depression, recurrent ([place] [address] )   • Long term (current) use of systemic steroids      Past Medical History:   Diagnosis Date   • Allergic    • Anemia    • Arthritis    • CAD (coronary artery disease) 2004   • Cancer ([place] [address] )     Leukemia   • Cellulitis of scalp    • CKD (chronic kidney disease) stage 3, GFR 30-59 ml/min (HCC)    • CLL (chronic lymphocytic leukemia) (HCC)    • COPD (chronic obstructive pulmonary disease) ([facility])    • Diabetes mellitus ([place] [address] )     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   • Dyslipidemia    • Edema extremities    • Esophageal ulcer    • H/O blood clots    • Hemolytic anemia (HCC)    • Hiatal hernia    • History of TIA (transient ischemic attack)    • Hyperlipidemia    • Hypertension    • Listeria infection 2018   • Multiple gastric ulcers    • Myocardial infarction [facility]) 2004    acute   • Neutropenia (HCC)    • Obese    • Recurrent sinusitis    • Thrombocytopenia (HCC)    • Vertigo       Past Surgical History:   Procedure Laterality Date   • ARTHROSCOPIC REPAIR ACL      lt knee   • CARDIAC SURGERY      cardiac cath with stent   • FOOT SURGERY     • IR PORT CHECK  5/17/2019   • KNEE ARTHROSCOPY     • OTHER SURGICAL HISTORY      cardiac cath lesion 1, 1st adjunct treat device: stent   • PORTACATH PLACEMENT     • NC ESOPHAGOGASTRODUODENOSCOPY TRANSORAL DIAGNOSTIC N/A 10/5/2017    Procedure: ESOPHAGOGASTRODUODENOSCOPY (EGD) with bx;  Surgeon: Frances Finn DO;  [facility]: [facility]; Service: Gastroenterology   • AL ESOPHAGOGASTRODUODENOSCOPY TRANSORAL DIAGNOSTIC N/A 3/8/2018    Procedure: ESOPHAGOGASTRODUODENOSCOPY (EGD) with biopsy;  Surgeon: Frances Finn DO;  [facility]; Service: Gastroenterology   • AL ESOPHAGOGASTRODUODENOSCOPY TRANSORAL DIAGNOSTIC N/A 2018    Procedure: ESOPHAGOGASTRODUODENOSCOPY (EGD) with Dilation;  Surgeon: Frances Finn DO;  [facility]; Service: Gastroenterology   • AL ESOPHAGOGASTRODUODENOSCOPY TRANSORAL DIAGNOSTIC N/A 10/18/2018    Procedure: ESOPHAGOGASTRODUODENOSCOPY (EGD) with dilation;  Surgeon: Madison Ordaz MD;  [facility]; Service: Gastroenterology   • AL ESOPHAGOSCOPY FLEXIBLE TRANSORAL WITH BIOPSY N/A 2016    Procedure: ESOPHAGOGASTRODUODENOSCOPY (EGD); ESOPHAGEAL DILATION; ESOPHAGEAL BIOPSY;  Surgeon: Toña Forman MD;  [facility]: [facility];  Service: Thoracic   • TONSILLECTOMY     • UPPER GASTROINTESTINAL ENDOSCOPY      x 6      Family History   Problem Relation Age of Onset   • Leukemia Mother         chronic   • Colon polyps Mother         sidmoid   • Parkinsonism Father      Social History     Tobacco Use   • Smoking status: Former Smoker     Packs/day: 2 00     Years: 33 00     Pack years: 66 00     Types: Cigarettes     Start date: 1     Quit date:      Years since quittin 8   • Smokeless tobacco: Never Used   Substance Use Topics   • Alcohol use:  Yes     Alcohol/week: 2 0 standard drinks     Types: 2 Cans of beer per week     Comment: social use as per [employer]     Allergies   Allergen Reactions   • Heparin Other (See Comments)     Other reaction(s): Blood Disorders  clot   • Macrolides And Ketolides Other (See Comments)     Interacts with other meds he is taking   • Simvastatin Myalgia         Current Outpatient Medications:   •  albuterol (2 5 mg/3 mL) 0 083 % nebulizer solution, Take 3 mL (2 5 mg total) by nebulization every 6 (six) hours as needed for wheezing or shortness of breath, Disp: 180 mL, Rfl: 5  •  amLODIPine (NORVASC) 10 mg tablet, Take 1 tablet (10 mg total) by mouth daily, Disp: 90 tablet, Rfl: 1  •  aspirin 81 MG tablet, Take 81 mg by mouth every other day Every other day , Disp: , Rfl:   •  citalopram (CeleXA) 40 mg tablet, TAKE 1 TABLET DAILY, Disp: 90 tablet, Rfl: 5  •  Continuous Blood Gluc Sensor (FreeStyle Ashley 2 Sensor) MISC, Change every 14 days, Disp: 2 each, Rfl: 3  •  fenofibrate (TRICOR) 145 mg tablet, TAKE 1 TABLET DAILY, Disp: 90 tablet, Rfl: 5  •  folic acid (FOLVITE) 1 mg tablet, Take 800 mcg by mouth daily , Disp: , Rfl:   •  gabapentin (NEURONTIN) 600 MG tablet, TAKE 1 TABLET DAILY, Disp: 90 tablet, Rfl: 3  •  glucose monitoring kit (FREESTYLE) monitoring kit, 1 each by Does not apply route as needed ( ) E 11 9, Disp: 1 each, Rfl: 0  •  Ibrutinib 140 MG TABS, Take 140 mg by mouth daily, Disp: 30 tablet, Rfl: 6  •  insulin degludec Raynette Alamin FlexTouch) 100 units/mL injection pen, Inject 14 Units under the skin daily at bedtime, Disp: 15 mL, Rfl: 1  •  insulin lispro (HumaLOG KwikPen) 100 units/mL injection pen, Use 5-17 units before meals as needed for steroid induced hyperglycemia, Disp: 15 mL, Rfl: 1  •  Insulin Pen Needle (BD Pen Needle Inez U/F) 32G X 4 MM MISC, Use 3 (three) times a day, Disp: 300 each, Rfl: 1  •  Lancets (FREESTYLE) lancets, Use as instructed--test 2 times a day  E 11 9, Disp: 100 each, Rfl: 0  •  LORazepam (ATIVAN) 0 5 mg tablet, Take 1 tablet by mouth daily as needed for anxiety, Disp: 30 tablet, Rfl: 0  •  losartan (COZAAR) 100 MG tablet, TAKE 1 TABLET DAILY, Disp: 90 tablet, Rfl: 5  •  mometasone-formoterol (Dulera) 200-5 MCG/ACT inhaler, Inhale 2 puffs 2 (two) times a day Rinse mouth after use   (Patient taking differently: Inhale 2 puffs 2 (two) times a day as needed Rinse mouth after use ), Disp: 13 g, Rfl: 1  •  multivitamin (THERAGRAN) TABS, Take 1 tablet by mouth daily, Disp: , Rfl:   •  naloxone (NARCAN) 4 mg/0 1 mL nasal spray, Administer 1 spray into a nostril  If no response after 2-3 minutes, give another dose in the other nostril using a new spray , Disp: 1 each, Rfl: 1  •  obinutuzumab (GAZYVA) 1000 mg/40 mL SOLN, Infuse into a venous catheter, Disp: , Rfl:   •  oxyCODONE (ROXICODONE) 10 MG TABS, Take 1 tablet (10 mg total) by mouth every 6 (six) hours as needed for moderate pain For ongoing pain control Max Daily Amount: 40 mg, Disp: 90 tablet, Rfl: 0  •  pantoprazole (PROTONIX) 40 mg tablet, Take 1 tablet (40 mg total) by mouth 2 (two) times a day, Disp: 180 tablet, Rfl: 3  •  predniSONE 5 mg tablet, TAKE 1 TABLET DAILY, Disp: 90 tablet, Rfl: 5  •  sodium chloride, PF, 0 9 %, 10 mL by Intracatheter route see administration instructions 10mL into port before and after ampicillin doses, Disp: , Rfl: 0  •  zolpidem (AMBIEN) 5 mg tablet, Take 1 tablet (5 mg total) by mouth daily at bedtime as needed for sleep, Disp: 30 tablet, Rfl: 0  •  acyclovir (ZOVIRAX) 400 MG tablet, TAKE 1 TABLET TWICE A DAY (Patient not taking: No sig reported), Disp: 60 tablet, Rfl: 11    Review of Systems   Constitutional: Negative for activity change, appetite change, chills, fever and unexpected weight change  HENT: Negative for sore throat and trouble swallowing  Eyes: Negative for visual disturbance  Respiratory: Negative for cough and shortness of breath  Cardiovascular: Negative for chest pain and palpitations  Gastrointestinal: Negative for abdominal distention and abdominal pain  Endocrine: Negative for cold intolerance, heat intolerance, polydipsia and polyuria  Genitourinary: Negative for hematuria  Musculoskeletal: Negative for arthralgias and back pain  Skin: Negative for rash  Neurological: Negative for dizziness and headaches  All other systems reviewed and are negative  Physical Exam:  Body mass index is 30 69 kg/m²    /78 (BP Location: Right arm, Patient Position: Sitting, Cuff Size: Large)   Pulse 75   Ht 6' 1" (1 854 m)   Wt 106 kg (232 lb 9 6 oz)   BMI 30 69 kg/m²    Wt Readings from Last 3 Encounters:   10/26/22 106 kg (232 lb 9 6 oz)   10/18/22 105 kg (231 lb 14 8 oz)   10/04/22 105 kg (232 lb 5 8 oz)       Physical Exam  Vitals reviewed  Constitutional:       Appearance: Normal appearance  Cardiovascular:      Rate and Rhythm: Normal rate and regular rhythm  Pulses: Normal pulses  Heart sounds: Normal heart sounds  Pulmonary:      Effort: Pulmonary effort is normal       Breath sounds: Normal breath sounds  Abdominal:      General: Abdomen is flat  Musculoskeletal:         General: Normal range of motion  Cervical back: Normal range of motion and neck supple  Skin:     General: Skin is warm and dry  Capillary Refill: Capillary refill takes less than 2 seconds  Neurological:      General: No focal deficit present  Mental Status: He is alert and oriented to person, place, and time     Psychiatric:         Mood and Affect: Mood normal          Behavior: Behavior normal          Labs:   Lab Results   Component Value Date    HGBA1C 7 0 (A) 10/26/2022    HGBA1C 7 6 (H) 02/22/2022    HGBA1C 7 4 (H) 11/30/2021     Lab Results   Component Value Date    CREATININE 1 18 10/04/2022    CREATININE 1 14 09/06/2022    CREATININE 1 26 08/09/2022    BUN 22 10/04/2022     12/29/2015    K 4 1 10/04/2022     10/04/2022    CO2 27 10/04/2022     eGFR   Date Value Ref Range Status   10/04/2022 63 ml/min/1 73sq m Final     Lab Results   Component Value Date    CHOL 122 02/11/2014    HDL 27 (L) 11/30/2021    TRIG 147 11/30/2021     Lab Results   Component Value Date    ALT 38 10/04/2022    AST 28 10/04/2022    ALKPHOS 66 10/04/2022    BILITOT 0 7 12/29/2015     Lab Results   Component Value Date    ELO5AKTKNZFB 0 984 05/03/2022    POX7GXTCQANO 1 190 12/01/2020    QMH3HKAXIUKH 0 970 06/18/2019     Lab Results   Component Value Date    FREET4 0 96 05/03/2022       CENTRAL  DXA SCAN: 5/10/2022     CLINICAL HISTORY:   78year old  male risk factors include osteoporosis screening  Additional medical history: CLL, steroid medication for 20 years  Type 2 diabetes      TECHNIQUE: Bone densitometry was performed using a [employer] W bone densitometer  Regions of interest appear properly placed  There are   other confounding variables which could limit the study        Degenerative or osteoarthritic changes are noted on the spine image in particular at L2 and L3 but no vertebrae are eliminated from evaluation      COMPARISON:  Follow-up     RESULTS:   LUMBAR SPINE:  L1-L4:  BMD 1 024 gm/cm2  T-score -0 6 and 2% less than 2019  Z-score 0 2     LEFT TOTAL HIP:  BMD 1 099 gm/cm2  T-score 0 4  Z-score 1 0     LEFT FEMORAL NECK:  BMD 0 793 gm/cm2  T-score -1 0 and unchanged from 2019  Z-score 0 1           IMPRESSION:  1  Based on the [facility] classification, this study is normal and the patient is considered at low risk for fracture  2  A daily intake of calcium of at least 1200 mg and vitamin D, 800-1000 IU, as well as weight bearing and muscle strengthening exercise, fall prevention and avoidance of tobacco and excessive alcohol intake as basic preventive measures are recommended      3  Repeat DXA scan on the same equipment in 18-24 months as clinically indicated         The 10 year risk of hip fracture is 1 1%, with the 10 year risk of major osteoporotic fracture being 7 9%, as calculated by the WHO fracture risk assessment tool (FRAX)    The current NOF guidelines recommend treating patients with FRAX 10 year risk score   of >3% for hip fracture and >20% for major osteoporotic fracture       WHO CLASSIFICATION:  Normal (a T-score of -1 0 or higher)  Low bone mineral density (a T-score of less than -1 0 but higher than -2 5)  Osteoporosis (a T-score of -2 5 or less)  Severe osteoporosis (a T-score of -2 5 or less with a fragility fracture)     Thank you for allowing us the opportunity to participate in your patient care      The expanded DEXA report will no longer be arriving in your mail  If you desire to view the full report please contact [facility]'s medical records or access the PACS system        Impression & Plan:    Problem List Items Addressed This Visit        Endocrine    Diabetes mellitus ([facility] [address] ) - Primary    Relevant Orders    POCT hemoglobin A1c (Completed)    Microalbumin / creatinine urine ratio    TSH, 3rd generation    T4, free- Lab Collect    HEMOGLOBIN A1C W/ EAG ESTIMATION Lab Collect    Lipid panel- Lab Collect          Orders Placed This Encounter   Procedures   • Microalbumin / creatinine urine ratio     Standing Status:   Future     Standing Expiration Date:   4/26/2023   • TSH, 3rd generation     This is a patient instruction: This test is non-fasting  Please drink two glasses of water morning of bloodwork  Standing Status:   Future     Standing Expiration Date:   4/26/2023   • T4, free- Lab Collect     Standing Status:   Future     Standing Expiration Date:   10/26/2023   • HEMOGLOBIN A1C W/ EAG ESTIMATION Lab Collect     Standing Status:   Future     Standing Expiration Date:   10/26/2023   • Lipid panel- Lab Collect     This is a patient instruction: This test requires patient fasting for 10-12 hours or longer  Drinking of black coffee or black tea is acceptable  Standing Status:   Future     Standing Expiration Date:   10/26/2023   • POCT hemoglobin A1c         Discussed with the patient and all questioned fully answered  He will call me if any problems arise  Follow-up appointment in 3 months       Counseled patient on diagnostic results, prognosis, risk and benefit of treatment options, instruction for management, importance of treatment compliance, Risk  factor reduction and impressions    Time spent with patient and on review of the record: 30 minutes    TEODORO He

## 2022-10-31 ENCOUNTER — OFFICE VISIT (OUTPATIENT)
Dept: CARDIOLOGY CLINIC | Facility: CLINIC | Age: 67
End: 2022-10-31

## 2022-10-31 VITALS
OXYGEN SATURATION: 95 % | WEIGHT: 230 LBS | BODY MASS INDEX: 30.48 KG/M2 | SYSTOLIC BLOOD PRESSURE: 142 MMHG | DIASTOLIC BLOOD PRESSURE: 80 MMHG | HEIGHT: 73 IN | HEART RATE: 80 BPM

## 2022-10-31 DIAGNOSIS — I10 PRIMARY HYPERTENSION: ICD-10-CM

## 2022-10-31 DIAGNOSIS — I25.10 CORONARY ARTERY DISEASE INVOLVING NATIVE CORONARY ARTERY OF NATIVE HEART WITHOUT ANGINA PECTORIS: Primary | ICD-10-CM

## 2022-10-31 DIAGNOSIS — E78.2 MIXED HYPERLIPIDEMIA: ICD-10-CM

## 2022-10-31 NOTE — LETTER
October 31, 2022     Isabel Conner DO  [address]  [address] [place] [address]    Patient: Norbert Garcia   YOB: 1955   Date of Visit: 10/31/2022       Dear Dr Joe Hernandez:    Thank you for referring Ernestina Scheuermann to me for evaluation  Below are my notes for this consultation  If you have questions, please do not hesitate to call me  I look forward to following your patient along with you  Sincerely,        Pardeep Grace MD        CC: No Recipients  Pardeep Grace MD  10/31/2022  9:01 AM  Sign when Signing Visit  Assessment/Plan:    Hyperlipidemia    Hyperlipidemia, stable  Patient will continue fenofibrate  He is intolerant of statins  Hypertension    Hypertension, stable  CAD (coronary artery disease)    Coronary artery disease, stable  No symptoms of angina or signs of heart failure  Diagnoses and all orders for this visit:    Coronary artery disease involving native coronary artery of native heart without angina pectoris    Primary hypertension    Mixed hyperlipidemia          Subjective:   Feels generally well  Patient ID: Norbert Garcia is a 75 y o  male  The patient presented to this office for the purpose of cardiac follow-up  He is known to have a history of coronary artery disease as well as hypertension, diabetes mellitus and hyperlipidemia  The patient has been feeling well from the cardiac standpoint denying any symptoms of chest pain, shortness of breath palpitation, dizziness or lightheadedness  He has no leg edema  The following portions of the patient's history were reviewed and updated as appropriate: allergies, current medications, past family history, past medical history, past social history, past surgical history and problem list     Review of Systems   Respiratory: Negative for apnea, cough, chest tightness, shortness of breath and wheezing  Cardiovascular: Negative for chest pain, palpitations and leg swelling  Gastrointestinal: Negative for abdominal pain  Neurological: Negative for dizziness and light-headedness  Psychiatric/Behavioral: Negative  Objective:  Stable cardiac-wise  /80 (BP Location: Left arm, Patient Position: Sitting, Cuff Size: Standard)   Pulse 80   Ht 6' 1" (1 854 m)   Wt 104 kg (230 lb)   SpO2 95%   BMI 30 34 kg/m²          Physical Exam  Vitals reviewed  Constitutional:       General: He is not in acute distress  Appearance: He is well-developed  He is not diaphoretic  HENT:      Head: Normocephalic  Eyes:      Pupils: Pupils are equal, round, and reactive to light  Neck:      Thyroid: No thyromegaly  Vascular: No JVD  Cardiovascular:      Rate and Rhythm: Normal rate and regular rhythm  Heart sounds: S1 normal and S2 normal  No murmur heard  No friction rub  No gallop  Pulmonary:      Effort: Pulmonary effort is normal  No respiratory distress  Breath sounds: Normal breath sounds  No wheezing or rales  Chest:      Chest wall: No tenderness  Abdominal:      Palpations: Abdomen is soft  Musculoskeletal:         General: No tenderness or deformity  Normal range of motion  Cervical back: Normal range of motion  Right lower leg: No edema  Left lower leg: No edema  Skin:     General: Skin is warm and dry  Neurological:      Mental Status: He is alert and oriented to person, place, and time     Psychiatric:         Mood and Affect: Mood normal          Behavior: Behavior normal

## 2022-10-31 NOTE — PROGRESS NOTES
Assessment/Plan:    Hyperlipidemia    Hyperlipidemia, stable  Patient will continue fenofibrate  He is intolerant of statins  Hypertension    Hypertension, stable  CAD (coronary artery disease)    Coronary artery disease, stable  No symptoms of angina or signs of heart failure  Diagnoses and all orders for this visit:    Coronary artery disease involving native coronary artery of native heart without angina pectoris    Primary hypertension    Mixed hyperlipidemia          Subjective:   Feels generally well  Patient ID: Jennifer Chanel is a 75 y o  male  The patient presented to this office for the purpose of cardiac follow-up  He is known to have a history of coronary artery disease as well as hypertension, diabetes mellitus and hyperlipidemia  The patient has been feeling well from the cardiac standpoint denying any symptoms of chest pain, shortness of breath palpitation, dizziness or lightheadedness  He has no leg edema  The following portions of the patient's history were reviewed and updated as appropriate: allergies, current medications, past family history, past medical history, past social history, past surgical history and problem list     Review of Systems   Respiratory: Negative for apnea, cough, chest tightness, shortness of breath and wheezing  Cardiovascular: Negative for chest pain, palpitations and leg swelling  Gastrointestinal: Negative for abdominal pain  Neurological: Negative for dizziness and light-headedness  Psychiatric/Behavioral: Negative  Objective:  Stable cardiac-wise  /80 (BP Location: Left arm, Patient Position: Sitting, Cuff Size: Standard)   Pulse 80   Ht 6' 1" (1 854 m)   Wt 104 kg (230 lb)   SpO2 95%   BMI 30 34 kg/m²          Physical Exam  Vitals reviewed  Constitutional:       General: He is not in acute distress  Appearance: He is well-developed  He is not diaphoretic  HENT:      Head: Normocephalic     Eyes: Pupils: Pupils are equal, round, and reactive to light  Neck:      Thyroid: No thyromegaly  Vascular: No JVD  Cardiovascular:      Rate and Rhythm: Normal rate and regular rhythm  Heart sounds: S1 normal and S2 normal  No murmur heard  No friction rub  No gallop  Pulmonary:      Effort: Pulmonary effort is normal  No respiratory distress  Breath sounds: Normal breath sounds  No wheezing or rales  Chest:      Chest wall: No tenderness  Abdominal:      Palpations: Abdomen is soft  Musculoskeletal:         General: No tenderness or deformity  Normal range of motion  Cervical back: Normal range of motion  Right lower leg: No edema  Left lower leg: No edema  Skin:     General: Skin is warm and dry  Neurological:      Mental Status: He is alert and oriented to person, place, and time     Psychiatric:         Mood and Affect: Mood normal          Behavior: Behavior normal

## 2022-11-01 ENCOUNTER — HOSPITAL ENCOUNTER (OUTPATIENT)
Dept: INFUSION CENTER | Facility: CLINIC | Age: 67
Discharge: HOME | End: 2022-11-01

## 2022-11-01 VITALS
HEIGHT: 73 IN | RESPIRATION RATE: 18 BRPM | BODY MASS INDEX: 30.8 KG/M2 | DIASTOLIC BLOOD PRESSURE: 83 MMHG | SYSTOLIC BLOOD PRESSURE: 138 MMHG | WEIGHT: 232.37 LBS | HEART RATE: 78 BPM | TEMPERATURE: 97.8 F

## 2022-11-01 DIAGNOSIS — C91.10 CLL (CHRONIC LYMPHOCYTIC LEUKEMIA) (HCC): Primary | ICD-10-CM

## 2022-11-01 LAB
ALBUMIN SERPL BCP-MCNC: 3.7 G/DL (ref 3.5–5)
ALP SERPL-CCNC: 49 U/L (ref 46–116)
ALT SERPL W P-5'-P-CCNC: 44 U/L (ref 12–78)
ANION GAP SERPL CALCULATED.3IONS-SCNC: 6 MMOL/L (ref 4–13)
AST SERPL W P-5'-P-CCNC: 30 U/L (ref 5–45)
BASOPHILS # BLD MANUAL: 0.05 THOUSAND/UL (ref 0–0.1)
BASOPHILS NFR MAR MANUAL: 1 % (ref 0–1)
BILIRUB SERPL-MCNC: 0.54 MG/DL (ref 0.2–1)
BUN SERPL-MCNC: 23 MG/DL (ref 5–25)
CALCIUM SERPL-MCNC: 8.9 MG/DL (ref 8.3–10.1)
CHLORIDE SERPL-SCNC: 106 MMOL/L (ref 96–108)
CO2 SERPL-SCNC: 29 MMOL/L (ref 21–32)
CREAT SERPL-MCNC: 1.12 MG/DL (ref 0.6–1.3)
EOSINOPHIL # BLD MANUAL: 0.2 THOUSAND/UL (ref 0–0.4)
EOSINOPHIL NFR BLD MANUAL: 4 % (ref 0–6)
ERYTHROCYTE [DISTWIDTH] IN BLOOD BY AUTOMATED COUNT: 13.3 % (ref 11.6–15.1)
GFR SERPL CREATININE-BSD FRML MDRD: 67 ML/MIN/1.73SQ M
GLUCOSE SERPL-MCNC: 143 MG/DL (ref 65–140)
HCT VFR BLD AUTO: 42.1 % (ref 36.5–49.3)
HGB BLD-MCNC: 13.6 G/DL (ref 12–17)
IGG SERPL-MCNC: 508 MG/DL (ref 700–1600)
LYMPHOCYTES # BLD AUTO: 0.81 THOUSAND/UL (ref 0.6–4.47)
LYMPHOCYTES # BLD AUTO: 16 % (ref 14–44)
MCH RBC QN AUTO: 30.8 PG (ref 26.8–34.3)
MCHC RBC AUTO-ENTMCNC: 32.3 G/DL (ref 31.4–37.4)
MCV RBC AUTO: 95 FL (ref 82–98)
MONOCYTES # BLD AUTO: 0.66 THOUSAND/UL (ref 0–1.22)
MONOCYTES NFR BLD: 13 % (ref 4–12)
MYELOCYTES NFR BLD MANUAL: 1 % (ref 0–1)
NEUTROPHILS # BLD MANUAL: 3.23 THOUSAND/UL (ref 1.85–7.62)
NEUTS BAND NFR BLD MANUAL: 1 % (ref 0–8)
NEUTS SEG NFR BLD AUTO: 63 % (ref 43–75)
PLATELET # BLD AUTO: 171 THOUSANDS/UL (ref 149–390)
PLATELET BLD QL SMEAR: ADEQUATE
PMV BLD AUTO: 11.4 FL (ref 8.9–12.7)
POTASSIUM SERPL-SCNC: 4.1 MMOL/L (ref 3.5–5.3)
PROT SERPL-MCNC: 6.5 G/DL (ref 6.4–8.4)
RBC # BLD AUTO: 4.42 MILLION/UL (ref 3.88–5.62)
RBC MORPH BLD: NORMAL
RETICS # AUTO: ABNORMAL 10*3/UL (ref 14356–105094)
RETICS # CALC: 1.98 % (ref 0.37–1.87)
SODIUM SERPL-SCNC: 141 MMOL/L (ref 135–147)
VARIANT LYMPHS # BLD AUTO: 1 %
WBC # BLD AUTO: 5.05 THOUSAND/UL (ref 4.31–10.16)

## 2022-11-01 RX ORDER — SODIUM CHLORIDE 9 MG/ML
20 INJECTION, SOLUTION INTRAVENOUS ONCE
Status: COMPLETED | OUTPATIENT
Start: 2022-11-01 | End: 2022-11-01

## 2022-11-01 RX ORDER — ACETAMINOPHEN 325 MG/1
650 TABLET ORAL ONCE
Status: COMPLETED | OUTPATIENT
Start: 2022-11-01 | End: 2022-11-01

## 2022-11-01 RX ADMIN — ACETAMINOPHEN 650 MG: 325 TABLET ORAL at 08:37

## 2022-11-01 RX ADMIN — OBINUTUZUMAB 1000 MG: 1000 INJECTION, SOLUTION, CONCENTRATE INTRAVENOUS at 09:33

## 2022-11-01 RX ADMIN — SODIUM CHLORIDE 20 ML/HR: 0.9 INJECTION, SOLUTION INTRAVENOUS at 08:31

## 2022-11-01 RX ADMIN — DEXAMETHASONE SODIUM PHOSPHATE 20 MG: 10 INJECTION, SOLUTION INTRAMUSCULAR; INTRAVENOUS at 08:33

## 2022-11-01 RX ADMIN — DIPHENHYDRAMINE HYDROCHLORIDE 25 MG: 50 INJECTION, SOLUTION INTRAMUSCULAR; INTRAVENOUS at 08:56

## 2022-11-02 ENCOUNTER — TELEPHONE (OUTPATIENT)
Dept: HEMATOLOGY ONCOLOGY | Facility: CLINIC | Age: 67
End: 2022-11-02

## 2022-11-02 ENCOUNTER — TELEPHONE (OUTPATIENT)
Dept: ENDOCRINOLOGY | Facility: CLINIC | Age: 67
End: 2022-11-02

## 2022-11-02 ENCOUNTER — OFFICE VISIT (OUTPATIENT)
Dept: HEMATOLOGY ONCOLOGY | Facility: CLINIC | Age: 67
End: 2022-11-02

## 2022-11-02 VITALS
WEIGHT: 132 LBS | RESPIRATION RATE: 18 BRPM | BODY MASS INDEX: 17.49 KG/M2 | SYSTOLIC BLOOD PRESSURE: 132 MMHG | HEART RATE: 72 BPM | DIASTOLIC BLOOD PRESSURE: 64 MMHG | HEIGHT: 73 IN | TEMPERATURE: 98 F | OXYGEN SATURATION: 98 %

## 2022-11-02 DIAGNOSIS — D59.10 AUTOIMMUNE HEMOLYTIC ANEMIA (HCC): ICD-10-CM

## 2022-11-02 DIAGNOSIS — G89.3 CANCER RELATED PAIN: ICD-10-CM

## 2022-11-02 DIAGNOSIS — C91.10 CHRONIC LYMPHATIC LEUKEMIA (HCC): ICD-10-CM

## 2022-11-02 DIAGNOSIS — C91.10 CLL (CHRONIC LYMPHOCYTIC LEUKEMIA) (HCC): Primary | ICD-10-CM

## 2022-11-02 RX ORDER — OXYCODONE HYDROCHLORIDE 10 MG/1
10 TABLET ORAL EVERY 6 HOURS PRN
Qty: 90 TABLET | Refills: 0 | Status: SHIPPED | OUTPATIENT
Start: 2022-11-02

## 2022-11-02 NOTE — PROGRESS NOTES
Hematology/Oncology Outpatient Follow-up  Gracie Zapien 75 y o  male 1955 [id number]    Date:  11/2/2022    Assessment and Plan:  1  CLL (chronic lymphocytic leukemia) ([facility] ), 2  Autoimmune hemolytic anemia ([facility] )  75year old male presents for follow up for history of CLL with hemolytic anemia  He is maintained on imbruvica 140 mg PO daily, Gazyva 1000 mg every 28 days  He also remains on prednisone 5 mg PO daily  His disease has remained in remission with this regimen  He is tolerating well  He states he plans to get 3 tattoos in the coming months  Reviewed that we would be more concerned with infection as oppose to bleeding  He states the location is very good standing and clean  If he were to have any signs of infection, call our office  Follow up in 4 months  - Infusion Calculated Appointment Request; Future  - CBC and differential; Future  - Comprehensive metabolic panel; Future    3  Cancer related pain  Arthralgias 2/2 imbruvica, not constant, worse with over activity  - oxyCODONE (ROXICODONE) 10 MG TABS; Take 1 tablet (10 mg total) by mouth every 6 (six) hours as needed for moderate pain For ongoing pain control Max Daily Amount: 40 mg  Dispense: 90 tablet;  Refill: 0      HPI:  Oncology History   CLL (chronic lymphocytic leukemia) ([facility] )   8/22/2017 -  Chemotherapy    chlorambucil (LEUKERAN), 0 5 mg/kg, Oral, Every 14 days, 6 of 6 cycles  obinutuzumab (GAZYVA) day 1 IVPB, 100 mg, Intravenous, Once, 1 of 1 cycle  obinutuzumab (GAZYVA) day 2 titrated infusion, 900 mg, Intravenous, Once, 1 of 1 cycle  obinutuzumab (GAZYVA) subsequent titrated infusion, 1,000 mg, Intravenous, Once, 51 of 56 cycles  Administration: 1,000 mg (5/21/2019), 1,000 mg (6/18/2019), 1,000 mg (7/16/2019), 1,000 mg (8/13/2019), 1,000 mg (9/10/2019), 1,000 mg (10/8/2019), 1,000 mg (11/5/2019), 1,000 mg (12/3/2019), 1,000 mg (12/31/2019), 1,000 mg (1/28/2020), 1,000 mg (2/25/2020), 1,000 mg (3/24/2020), 1,000 mg (4/21/2020), 1,000 mg (5/19/2020), 1,000 mg (6/16/2020), 1,000 mg (7/14/2020), 1,000 mg (8/11/2020), 1,000 mg (9/9/2020), 1,000 mg (10/6/2020), 1,000 mg (11/3/2020), 1,000 mg (12/1/2020), 1,000 mg (1/26/2021), 1,000 mg (12/29/2020), 1,000 mg (2/23/2021), 1,000 mg (3/23/2021), 1,000 mg (4/20/2021), 1,000 mg (5/18/2021), 1,000 mg (6/15/2021), 1,000 mg (7/13/2021), 1,000 mg (8/10/2021), 1,000 mg (9/7/2021), 1,000 mg (10/5/2021), 1,000 mg (11/2/2021), 1,000 mg (11/30/2021), 1,000 mg (12/28/2021), 1,000 mg (1/25/2022), 1,000 mg (2/22/2022), 1,000 mg (3/22/2022), 1,000 mg (5/17/2022), 1,000 mg (6/14/2022), 1,000 mg (7/12/2022), 1,000 mg (8/9/2022), 1,000 mg (9/6/2022), 1,000 mg (10/4/2022), 1,000 mg (11/1/2022)     4/24/2018 Initial Diagnosis    CLL (chronic lymphocytic leukemia) ([facility] )       53-year-old male presents for follow-up regarding a longstanding history of CLL as well as hemolytic anemia related to his CLL      On 3/20/17 patient found to have hemoglobin of 6 2, ,000  He was admitted to [facility] for blood transfusion and plan to transfer to [address] [place] [address]  On 3/20/17 WBC count 195,000, hemoglobin 4 9, platelet count 65  Direct coomb's was positive with undetectable haptoglobin  He was given 2 units of PRBCs, Solu-Medrol 1 g ×3 days and IVIG 1 g/kg daily ×3 days  His hemoglobin continued to drop  Bone marrow biopsy on 3/21 showed marrow cellularity of greater than 95% almost replaced by small lymphocytes, lambda light chain restricted, CD20 positive, CD19 positive, CD23 positive partially expressing CD11c and CD25 and CD5 positive and negative for CD 79 and CD38  He was treated with single dose of chlorambucil to control his CLL   3/22 second dose of chlorambucil, also Rituxan 375 mg/M ² autoimmune hemolytic anemia  WBC continued to rise, 266,000  Patient initiated with Venetoclax 20 mg daily   Tumor lysis monitored every 8 hours; given aggressive hydration and Lasix and remained euvolemic  3/24-WBC dropped to 112,000  3/25- WBC 63,000  3/26-WBC 15,000, , platelet count 27,078  Patient became febrile, 101 3  The cefepime initiated Venetoclax held  3/28-patient fell from bed, CT head negative  ANC 1200, cefepime discontinued and Levaquin 75 mg daily started sliding 3/29 given second dose of rituximab 375 mg/m ²   3/30-WBC meg to 14 5 thousand, platelets 99,770, Venetoclax restarted 10 mg every other day  3/31 WBC 4 4, , platelet count 58,046  4/1-4/4: WBC maintained less than 10,000, ANC and platelet count meg  He was discharged on Venetoclax 10 mg every other day  He was instructed to discontinue simvastatin, Ranexa, aspirin, metoprolol 50 mg q  day, losartan 50 mg q  day, Plavix 75 mg q  day, folic acid 855 µg b i d , prednisone 60 mg, allopurinol  He was advised to continue Levaquin 750 mg daily for 10 days, Lexapro 10 mg daily, fenofibrate 50 mg daily, gabapentin 100 mg twice daily, Protonix 40 mg daily     Imaging studies performed at [facility]:  Patient had echocardiogram while inpatient at [facility] on 3/21  This study was unremarkable  CT chest abdomen pelvis without contrast on 3/24 showed stable pulmonary nodules, patchy groundglass densities of lower lobes previously identified and which may represent volume loss or early infiltrate  Persistent diffuse bronchial wall thickening suggesting acute/chronic bronchitis changes  No bronchiectasis  No masses  Stable lymphadenopathy of mediastinum  Stable axillary lymphadenopathy  Splenomegaly 23 9 cm  Extensive lymphadenopathy throughout the entire retroperitoneal, small bowel mesentery, and pelvis  Largest lymph node in right lower quadrant measured 4 6 x 4 8  Stable enlarged left para-aortic lymph node measuring 6 2 x 6 8   4/12/17: Patient received one dose of Rituxan on 4/7/17  Venetoclax 10 mg every other day 4/5/17 - 4/9/17  Venetoclax 10 mg every day beginning 4/10/17   Monitoring CBC 3 times per week  4/28/17: patient had follow-up appointment at [facility] with Dr Domingo Chao  She recommended to slowly increase dose of veneteclax  Also, she recommended as he has had numerous treatments with Rituxan, if antibody directed therapy becomes indicated in the future recommendation was with [facility]  She will be seeing her on a p r n  Basis      July 2017- Hemolysis  Disease not under control with veneteclax  Started prednisone 60 mg daily, folic acid, IBRUTINIB 244 mg daily and Gazyva       Sept 2017- 9/18-9/20 patient was admitted due to uncontrolled hyperglycemia with new onset DM type II due to chronic prednisone use for CLL/hemolytic anemia; also found to have profound neutropenia  Ibrutinib dose was reduced to 280 mg daily      October 2017- 10/7/17 - 10/14/17 patient was admitted due to cellulitis on his scalp     Dec 2017- Adriane Lofts decreased to 140 mg PO daily due to thrombocytopenia      4/23 - 4/27 patient was admitted due to listeria bacteremia  He was discharged on IV ampicillin  Echo negative for vegetations  Imbruvica and [facility] was on hold at that time  Repeat CBC on 5/14/18 showed normal ANC, and hemoglobin  Platelets adequate at 83K      10/18/18 the patient EGD   This showed distal esophageal stricture   This was dilated to 18 mm using a balloon dilator   He could not dilate any further due to bleeding secondary to thrombocytopenia     He had Evusheld on 2/17/22, scheduled for 2nd dose in Aug 2022  Interval history:    ROS: Review of Systems   Constitutional: Negative for appetite change, chills, fatigue, fever and unexpected weight change  Respiratory: Negative for cough and shortness of breath  Cardiovascular: Negative for chest pain, palpitations and leg swelling  Gastrointestinal: Negative for abdominal pain, constipation, diarrhea, nausea and vomiting  Genitourinary: Negative for difficulty urinating, dysuria and hematuria     Musculoskeletal: Positive for arthralgias (chronic intermittent, knees/shoulders/hands, worse after doing yard work)  Neurological: Negative for dizziness, weakness, light-headedness and headaches  Hematological: Bruises/bleeds easily  Psychiatric/Behavioral: Negative  Past Medical History:   Diagnosis Date   • Allergic    • Anemia    • Arthritis    • CAD (coronary artery disease) 2004   • Cancer ([place] [address] )     Leukemia   • Cellulitis of scalp    • CKD (chronic kidney disease) stage 3, GFR 30-59 ml/min ([facility])    • CLL (chronic lymphocytic leukemia) (H[facility])    • COPD (chronic obstructive pulmonary disease) ([facility])    • Diabetes mellitus ([place] [address] )     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   • Dyslipidemia    • Edema extremities    • Esophageal ulcer    • H/O blood clots    • Hemolytic anemia ([facility])    • Hiatal hernia    • History of TIA (transient ischemic attack)    • Hyperlipidemia    • Hypertension    • Listeria infection 2018   • Multiple gastric ulcers    • Myocardial infarction [facility]) 2004    acute   • Neutropenia ([facility])    • Obese    • Recurrent sinusitis    • Thrombocytopenia ([facility])    • Vertigo        Past Surgical History:   Procedure Laterality Date   • ARTHROSCOPIC REPAIR ACL      lt knee   • CARDIAC SURGERY      cardiac cath with stent   • FOOT SURGERY     • IR PORT CHECK  5/17/2019   • KNEE ARTHROSCOPY     • OTHER SURGICAL HISTORY      cardiac cath lesion 1, 1st adjunct treat device: stent   • PORTACATH PLACEMENT     • IA ESOPHAGOGASTRODUODENOSCOPY TRANSORAL DIAGNOSTIC N/A 10/5/2017    Procedure: ESOPHAGOGASTRODUODENOSCOPY (EGD) with bx;  Surgeon: Patricio Castano DO;  Location: [facility]; Service: Gastroenterology   • IA ESOPHAGOGASTRODUODENOSCOPY TRANSORAL DIAGNOSTIC N/A 3/8/2018    Procedure: ESOPHAGOGASTRODUODENOSCOPY (EGD) with biopsy;  Surgeon: Patricio Castano DO;  Location: [facility];   Service: Gastroenterology   • IA ESOPHAGOGASTRODUODENOSCOPY TRANSORAL DIAGNOSTIC N/A 6/20/2018    Procedure: ESOPHAGOGASTRODUODENOSCOPY (EGD) with Dilation; Surgeon: Mayra Hallman DO;  Location: [facility]; Service: Gastroenterology   • DC ESOPHAGOGASTRODUODENOSCOPY TRANSORAL DIAGNOSTIC N/A 10/18/2018    Procedure: ESOPHAGOGASTRODUODENOSCOPY (EGD) with dilation;  Surgeon: Flory Clifford MD;  Location: [facility]; Service: Gastroenterology   • DC ESOPHAGOSCOPY FLEXIBLE TRANSORAL WITH BIOPSY N/A 2016    Procedure: ESOPHAGOGASTRODUODENOSCOPY (EGD); ESOPHAGEAL DILATION; ESOPHAGEAL BIOPSY;  Surgeon: Eliu Allan MD;  Location:  MAIN OR;  Service: Thoracic   • TONSILLECTOMY     • UPPER GASTROINTESTINAL ENDOSCOPY      x 6       Social History     Socioeconomic History   • Marital status: /Civil Union     Spouse name: None   • Number of children: None   • Years of education: None   • Highest education level: None   Occupational History   • Occupation: [employer]   • Occupation: retired    Tobacco Use   • Smoking status: Former Smoker     Packs/day: 2 00     Years: 33 00     Pack years: 66 00     Types: Cigarettes     Start date:      Quit date:      Years since quittin 8   • Smokeless tobacco: Never Used   Vaping Use   • Vaping Use: Never used   Substance and Sexual Activity   • Alcohol use: Yes     Alcohol/week: 2 0 standard drinks     Types: 2 Cans of beer per week     Comment: social use as per [employer]   • Drug use: Never   • Sexual activity: None   Other Topics Concern   • None   Social History Narrative   • None     Social Determinants of Health     Financial Resource Strain: Not on file   Food Insecurity: No Food Insecurity   • Worried About Running Out of Food in the Last Year: Never true   • Ran Out of Food in the Last Year: Never true   Transportation Needs: No Transportation Needs   • Lack of Transportation (Medical): No   • Lack of Transportation (Non-Medical):  No   Physical Activity: Not on file   Stress: Not on file   Social Connections: Not on file   Intimate Partner Violence: Not on file   Housing Stability: Low Risk    • Unable to Pay for Housing in the Last Year: No   • Number of Places Lived in the Last Year: 1   • Unstable Housing in the Last Year: No       Family History   Problem Relation Age of Onset   • Leukemia Mother         chronic   • Colon polyps Mother         sidmoid   • Parkinsonism Father        Allergies   Allergen Reactions   • Heparin Other (See Comments)     Other reaction(s): Blood Disorders  clot   • Macrolides And Ketolides Other (See Comments)     Interacts with other meds he is taking   • Simvastatin Myalgia         Current Outpatient Medications:   •  acyclovir (ZOVIRAX) 400 MG tablet, TAKE 1 TABLET TWICE A DAY, Disp: 60 tablet, Rfl: 11  •  albuterol (2 5 mg/3 mL) 0 083 % nebulizer solution, Take 3 mL (2 5 mg total) by nebulization every 6 (six) hours as needed for wheezing or shortness of breath, Disp: 180 mL, Rfl: 5  •  amLODIPine (NORVASC) 10 mg tablet, Take 1 tablet (10 mg total) by mouth daily, Disp: 90 tablet, Rfl: 1  •  aspirin 81 MG tablet, Take 81 mg by mouth every other day Every other day , Disp: , Rfl:   •  citalopram (CeleXA) 40 mg tablet, TAKE 1 TABLET DAILY, Disp: 90 tablet, Rfl: 5  •  Continuous Blood Gluc Sensor (FreeStyle Ashley 2 Sensor) MISC, Change every 14 days, Disp: 2 each, Rfl: 3  •  fenofibrate (TRICOR) 145 mg tablet, TAKE 1 TABLET DAILY, Disp: 90 tablet, Rfl: 5  •  folic acid (FOLVITE) 1 mg tablet, Take 800 mcg by mouth daily , Disp: , Rfl:   •  gabapentin (NEURONTIN) 600 MG tablet, TAKE 1 TABLET DAILY, Disp: 90 tablet, Rfl: 3  •  glucose monitoring kit (FREESTYLE) monitoring kit, 1 each by Does not apply route as needed ( ) E 11 9, Disp: 1 each, Rfl: 0  •  insulin degludec [place] FlexTouch) 100 units/mL injection pen, Inject 14 Units under the skin daily at bedtime, Disp: 15 mL, Rfl: 1  •  insulin lispro (HumaLOG KwikPen) 100 units/mL injection pen, Use 5-17 units before meals as needed for steroid induced hyperglycemia (Patient taking differently: Use 7 units before meals as needed for steroid induced hyperglycemia), Disp: 15 mL, Rfl: 1  •  Insulin Pen Needle (BD Pen Needle [place] U/F) 32G X 4 MM MISC, Use 3 (three) times a day, Disp: 300 each, Rfl: 1  •  Lancets (FREESTYLE) lancets, Use as instructed--test 2 times a day  E 11 9, Disp: 100 each, Rfl: 0  •  LORazepam (ATIVAN) 0 5 mg tablet, Take 1 tablet by mouth daily as needed for anxiety, Disp: 30 tablet, Rfl: 0  •  losartan (COZAAR) 100 MG tablet, TAKE 1 TABLET DAILY, Disp: 90 tablet, Rfl: 5  •  mometasone-formoterol (Dulera) 200-5 MCG/ACT inhaler, Inhale 2 puffs 2 (two) times a day Rinse mouth after use  (Patient taking differently: Inhale 2 puffs 2 (two) times a day as needed Rinse mouth after use ), Disp: 13 g, Rfl: 1  •  multivitamin (THERAGRAN) TABS, Take 1 tablet by mouth daily, Disp: , Rfl:   •  naloxone (NARCAN) 4 mg/0 1 mL nasal spray, Administer 1 spray into a nostril   If no response after 2-3 minutes, give another dose in the other nostril using a new spray , Disp: 1 each, Rfl: 1  •  obinutuzumab (GAZYVA) 1000 mg/40 mL SOLN, Infuse into a venous catheter, Disp: , Rfl:   •  oxyCODONE (ROXICODONE) 10 MG TABS, Take 1 tablet (10 mg total) by mouth every 6 (six) hours as needed for moderate pain For ongoing pain control Max Daily Amount: 40 mg, Disp: 90 tablet, Rfl: 0  •  pantoprazole (PROTONIX) 40 mg tablet, Take 1 tablet (40 mg total) by mouth 2 (two) times a day, Disp: 180 tablet, Rfl: 3  •  predniSONE 5 mg tablet, TAKE 1 TABLET DAILY, Disp: 90 tablet, Rfl: 5  •  sodium chloride, PF, 0 9 %, 10 mL by Intracatheter route see administration instructions 10mL into port before and after ampicillin doses, Disp: , Rfl: 0  •  zolpidem (AMBIEN) 5 mg tablet, Take 1 tablet (5 mg total) by mouth daily at bedtime as needed for sleep, Disp: 30 tablet, Rfl: 0  •  Ibrutinib 140 MG TABS, Take 140 mg by mouth daily, Disp: 30 tablet, Rfl: 6    Physical Exam:  /64 (BP Location: Left arm, Patient Position: Sitting, Cuff Size: Adult)   Pulse 72   Temp 98 °F (36 7 °C) (Temporal)   Resp 18   Ht 6' 0 99" (1 854 m)   Wt 59 9 kg (132 lb)   SpO2 98%   BMI 17 42 kg/m²     Physical Exam  Vitals reviewed  Constitutional:       General: He is not in acute distress  Appearance: He is well-developed  He is not ill-appearing  HENT:      Head: Normocephalic and atraumatic  Eyes:      General: No scleral icterus  Conjunctiva/sclera: Conjunctivae normal    Cardiovascular:      Rate and Rhythm: Normal rate and regular rhythm  Heart sounds: Normal heart sounds  No murmur heard  Pulmonary:      Effort: Pulmonary effort is normal  No respiratory distress  Breath sounds: Normal breath sounds  Abdominal:      Palpations: Abdomen is soft  Tenderness: There is no abdominal tenderness  Musculoskeletal:         General: No tenderness  Normal range of motion  Cervical back: Normal range of motion and neck supple  Right lower leg: No edema  Left lower leg: No edema  Lymphadenopathy:      Cervical: No cervical adenopathy  Skin:     General: Skin is warm and dry  Neurological:      Mental Status: He is alert and oriented to person, place, and time  Cranial Nerves: No cranial nerve deficit     Psychiatric:         Mood and Affect: Mood normal          Behavior: Behavior normal        Labs:  Lab Results   Component Value Date    WBC 5 05 11/01/2022    HGB 13 6 11/01/2022    HCT 42 1 11/01/2022    MCV 95 11/01/2022     11/01/2022     Lab Results   Component Value Date     12/29/2015    K 4 1 11/01/2022     11/01/2022    CO2 29 11/01/2022    ANIONGAP 8 12/29/2015    BUN 23 11/01/2022    CREATININE 1 12 11/01/2022    GLUCOSE 109 12/29/2015    GLUF 144 (H) 05/03/2022    CALCIUM 8 9 11/01/2022    CORRECTEDCA 10 0 03/23/2021    AST 30 11/01/2022    ALT 44 11/01/2022    ALKPHOS 49 11/01/2022    PROT 5 9 (L) 12/29/2015    BILITOT 0 7 12/29/2015    EGFR 67 11/01/2022     Patient voiced understanding and agreement in the above discussion  Aware to contact our office with questions/symptoms in the interim  This note has been generated by voice recognition software system  Therefore, there may be spelling, grammar, and or syntax errors  Please contact if questions arise

## 2022-11-02 NOTE — TELEPHONE ENCOUNTER
MEDICATION REFILL ROUTE TO RN    Who is Calling  Physician Office   Medication  Ibrutinib 140 MG TABS       How many pills left N/A   Preferred Pharmacy / Address  [facility]   Who is your Physician?  Raymond   Call back number  [phone]   Replaced by [id number]   Relevant Information  Yes

## 2022-11-02 NOTE — TELEPHONE ENCOUNTER
Please schedule more treatment for patient  Patient goes Tuesday's at [facility]  Thank you!

## 2022-11-02 NOTE — TELEPHONE ENCOUNTER
Left a detailed message on answering machine  Made patient aware additional cycles have been schedule and can be seen on mychart  Patient was provided with my teams number

## 2022-11-03 DIAGNOSIS — E78.2 MIXED HYPERLIPIDEMIA: ICD-10-CM

## 2022-11-03 DIAGNOSIS — Z79.4 TYPE 2 DIABETES MELLITUS WITH DIABETIC POLYNEUROPATHY, WITH LONG-TERM CURRENT USE OF INSULIN (HCC): Primary | ICD-10-CM

## 2022-11-03 DIAGNOSIS — E11.42 TYPE 2 DIABETES MELLITUS WITH DIABETIC POLYNEUROPATHY, WITH LONG-TERM CURRENT USE OF INSULIN (HCC): Primary | ICD-10-CM

## 2022-11-09 ENCOUNTER — HOSPITAL ENCOUNTER (OUTPATIENT)
Dept: INFUSION CENTER | Facility: CLINIC | Age: 67
Discharge: HOME | End: 2022-11-09

## 2022-11-09 VITALS
RESPIRATION RATE: 18 BRPM | HEART RATE: 81 BPM | OXYGEN SATURATION: 97 % | DIASTOLIC BLOOD PRESSURE: 75 MMHG | SYSTOLIC BLOOD PRESSURE: 147 MMHG | TEMPERATURE: 97.1 F

## 2022-11-09 DIAGNOSIS — C91.10 CHRONIC LYMPHOCYTIC LEUKEMIA (HCC): Primary | ICD-10-CM

## 2022-11-09 RX ADMIN — AZD7442 600 MG COMBINED: KIT at 11:20

## 2022-11-09 NOTE — PROGRESS NOTES
Patient to Chema David: Offers no complaints at present time: Lab work ( 11/01/22 ) reviewed:  Within parameters to treat: Verbal consent given for EUA use: Written information provided

## 2022-11-11 ENCOUNTER — TELEPHONE (OUTPATIENT)
Dept: ENDOCRINOLOGY | Facility: CLINIC | Age: 67
End: 2022-11-11

## 2022-11-11 DIAGNOSIS — E11.42 TYPE 2 DIABETES MELLITUS WITH DIABETIC POLYNEUROPATHY, WITH LONG-TERM CURRENT USE OF INSULIN (HCC): Primary | ICD-10-CM

## 2022-11-11 DIAGNOSIS — Z79.4 TYPE 2 DIABETES MELLITUS WITH DIABETIC POLYNEUROPATHY, WITH LONG-TERM CURRENT USE OF INSULIN (HCC): Primary | ICD-10-CM

## 2022-11-11 DIAGNOSIS — Z79.4 TYPE 2 DIABETES MELLITUS WITHOUT COMPLICATION, WITH LONG-TERM CURRENT USE OF INSULIN (HCC): ICD-10-CM

## 2022-11-11 DIAGNOSIS — E11.9 TYPE 2 DIABETES MELLITUS WITHOUT COMPLICATION, WITH LONG-TERM CURRENT USE OF INSULIN (HCC): ICD-10-CM

## 2022-11-11 RX ORDER — BLOOD SUGAR DIAGNOSTIC
1 STRIP MISCELLANEOUS 3 TIMES DAILY
Qty: 50 EACH | Refills: 0 | Status: SHIPPED | OUTPATIENT
Start: 2022-11-11

## 2022-11-11 RX ORDER — FLASH GLUCOSE SENSOR
KIT MISCELLANEOUS
Qty: 2 EACH | Refills: 0 | Status: SHIPPED | OUTPATIENT
Start: 2022-11-11

## 2022-11-11 NOTE — TELEPHONE ENCOUNTER
Patient called stating he won't be able to get [place] [employer] sensor in time from [place] and will need testing strips for now

## 2022-11-11 NOTE — TELEPHONE ENCOUNTER
Patient is now asking for sensors to be sent to pharmacy instead  I did make him aware that insurance may not approve it through local pharmacy

## 2022-11-12 LAB
DME PARACHUTE DELIVERY DATE EXPECTED: NORMAL
DME PARACHUTE DELIVERY DATE REQUESTED: NORMAL
DME PARACHUTE ITEM DESCRIPTION: NORMAL
DME PARACHUTE ORDER STATUS: NORMAL
DME PARACHUTE SUPPLIER NAME: NORMAL
DME PARACHUTE SUPPLIER PHONE: NORMAL

## 2022-11-14 LAB
DME PARACHUTE DELIVERY DATE ACTUAL: NORMAL
DME PARACHUTE DELIVERY DATE EXPECTED: NORMAL
DME PARACHUTE DELIVERY DATE REQUESTED: NORMAL
DME PARACHUTE ITEM DESCRIPTION: NORMAL
DME PARACHUTE ORDER STATUS: NORMAL
DME PARACHUTE SUPPLIER NAME: NORMAL
DME PARACHUTE SUPPLIER PHONE: NORMAL

## 2022-11-15 ENCOUNTER — HOSPITAL ENCOUNTER (OUTPATIENT)
Dept: INFUSION CENTER | Facility: CLINIC | Age: 67
Discharge: HOME | End: 2022-11-15

## 2022-11-15 VITALS
TEMPERATURE: 98.2 F | SYSTOLIC BLOOD PRESSURE: 155 MMHG | WEIGHT: 229.28 LBS | DIASTOLIC BLOOD PRESSURE: 79 MMHG | RESPIRATION RATE: 16 BRPM | HEART RATE: 75 BPM | BODY MASS INDEX: 30.26 KG/M2

## 2022-11-15 DIAGNOSIS — C91.10 CLL (CHRONIC LYMPHOCYTIC LEUKEMIA) (HCC): Primary | ICD-10-CM

## 2022-11-15 DIAGNOSIS — D80.1 HYPOGAMMAGLOBULINEMIA, ACQUIRED (HCC): ICD-10-CM

## 2022-11-15 RX ORDER — SODIUM CHLORIDE 9 MG/ML
20 INJECTION, SOLUTION INTRAVENOUS ONCE
Status: COMPLETED | OUTPATIENT
Start: 2022-11-15 | End: 2022-11-15

## 2022-11-15 RX ORDER — ACETAMINOPHEN 325 MG/1
650 TABLET ORAL ONCE
Status: COMPLETED | OUTPATIENT
Start: 2022-11-15 | End: 2022-11-15

## 2022-11-15 RX ORDER — SODIUM CHLORIDE 9 MG/ML
20 INJECTION, SOLUTION INTRAVENOUS ONCE
OUTPATIENT
Start: 2022-12-13

## 2022-11-15 RX ORDER — ACETAMINOPHEN 325 MG/1
650 TABLET ORAL ONCE
OUTPATIENT
Start: 2022-12-13

## 2022-11-15 RX ADMIN — ACETAMINOPHEN 650 MG: 325 TABLET ORAL at 08:36

## 2022-11-15 RX ADMIN — DIPHENHYDRAMINE HYDROCHLORIDE 12.5 MG: 50 INJECTION, SOLUTION INTRAMUSCULAR; INTRAVENOUS at 08:38

## 2022-11-15 RX ADMIN — SODIUM CHLORIDE 20 ML/HR: 0.9 INJECTION, SOLUTION INTRAVENOUS at 08:36

## 2022-11-15 RX ADMIN — HYDROCORTISONE SODIUM SUCCINATE 100 MG: 100 INJECTION, POWDER, FOR SOLUTION INTRAMUSCULAR; INTRAVENOUS at 09:09

## 2022-11-15 RX ADMIN — Medication 20 G: at 09:21

## 2022-11-23 RX ORDER — ACETAMINOPHEN 325 MG/1
650 TABLET ORAL ONCE
Status: CANCELLED | OUTPATIENT
Start: 2022-11-29

## 2022-11-23 RX ORDER — SODIUM CHLORIDE 9 MG/ML
20 INJECTION, SOLUTION INTRAVENOUS ONCE
Status: CANCELLED | OUTPATIENT
Start: 2022-11-29

## 2022-11-29 ENCOUNTER — HOSPITAL ENCOUNTER (OUTPATIENT)
Dept: INFUSION CENTER | Facility: CLINIC | Age: 67
Discharge: HOME | End: 2022-11-29

## 2022-11-29 VITALS
TEMPERATURE: 97.6 F | DIASTOLIC BLOOD PRESSURE: 78 MMHG | HEART RATE: 83 BPM | BODY MASS INDEX: 30.84 KG/M2 | RESPIRATION RATE: 16 BRPM | WEIGHT: 233.69 LBS | SYSTOLIC BLOOD PRESSURE: 166 MMHG

## 2022-11-29 DIAGNOSIS — C91.10 CLL (CHRONIC LYMPHOCYTIC LEUKEMIA) (HCC): Primary | ICD-10-CM

## 2022-11-29 LAB
ALBUMIN SERPL BCP-MCNC: 3.7 G/DL (ref 3.5–5)
ALP SERPL-CCNC: 63 U/L (ref 46–116)
ALT SERPL W P-5'-P-CCNC: 42 U/L (ref 12–78)
ANION GAP SERPL CALCULATED.3IONS-SCNC: 7 MMOL/L (ref 4–13)
AST SERPL W P-5'-P-CCNC: 27 U/L (ref 5–45)
BASOPHILS # BLD MANUAL: 0.06 THOUSAND/UL (ref 0–0.1)
BASOPHILS NFR MAR MANUAL: 1 % (ref 0–1)
BILIRUB SERPL-MCNC: 0.4 MG/DL (ref 0.2–1)
BUN SERPL-MCNC: 23 MG/DL (ref 5–25)
CALCIUM SERPL-MCNC: 8.6 MG/DL (ref 8.3–10.1)
CHLORIDE SERPL-SCNC: 108 MMOL/L (ref 96–108)
CO2 SERPL-SCNC: 27 MMOL/L (ref 21–32)
CREAT SERPL-MCNC: 1.08 MG/DL (ref 0.6–1.3)
EOSINOPHIL # BLD MANUAL: 0.28 THOUSAND/UL (ref 0–0.4)
EOSINOPHIL NFR BLD MANUAL: 5 % (ref 0–6)
ERYTHROCYTE [DISTWIDTH] IN BLOOD BY AUTOMATED COUNT: 13.5 % (ref 11.6–15.1)
GFR SERPL CREATININE-BSD FRML MDRD: 70 ML/MIN/1.73SQ M
GLUCOSE SERPL-MCNC: 180 MG/DL (ref 65–140)
HCT VFR BLD AUTO: 43.5 % (ref 36.5–49.3)
HGB BLD-MCNC: 14.3 G/DL (ref 12–17)
IGG SERPL-MCNC: 531 MG/DL (ref 700–1600)
LYMPHOCYTES # BLD AUTO: 1.13 THOUSAND/UL (ref 0.6–4.47)
LYMPHOCYTES # BLD AUTO: 20 % (ref 14–44)
MCH RBC QN AUTO: 30.7 PG (ref 26.8–34.3)
MCHC RBC AUTO-ENTMCNC: 32.9 G/DL (ref 31.4–37.4)
MCV RBC AUTO: 93 FL (ref 82–98)
MONOCYTES # BLD AUTO: 0.62 THOUSAND/UL (ref 0–1.22)
MONOCYTES NFR BLD: 11 % (ref 4–12)
NEUTROPHILS # BLD MANUAL: 3.55 THOUSAND/UL (ref 1.85–7.62)
NEUTS BAND NFR BLD MANUAL: 1 % (ref 0–8)
NEUTS SEG NFR BLD AUTO: 62 % (ref 43–75)
PLATELET # BLD AUTO: 159 THOUSANDS/UL (ref 149–390)
PLATELET BLD QL SMEAR: ADEQUATE
PMV BLD AUTO: 11.9 FL (ref 8.9–12.7)
POTASSIUM SERPL-SCNC: 3.9 MMOL/L (ref 3.5–5.3)
PROT SERPL-MCNC: 6.5 G/DL (ref 6.4–8.4)
RBC # BLD AUTO: 4.66 MILLION/UL (ref 3.88–5.62)
RBC MORPH BLD: NORMAL
RETICS # AUTO: ABNORMAL 10*3/UL (ref 14356–105094)
RETICS # CALC: 2.08 % (ref 0.37–1.87)
SODIUM SERPL-SCNC: 142 MMOL/L (ref 135–147)
WBC # BLD AUTO: 5.63 THOUSAND/UL (ref 4.31–10.16)

## 2022-11-29 RX ORDER — SODIUM CHLORIDE 9 MG/ML
20 INJECTION, SOLUTION INTRAVENOUS ONCE
Status: COMPLETED | OUTPATIENT
Start: 2022-11-29 | End: 2022-11-29

## 2022-11-29 RX ORDER — ACETAMINOPHEN 325 MG/1
650 TABLET ORAL ONCE
Status: COMPLETED | OUTPATIENT
Start: 2022-11-29 | End: 2022-11-29

## 2022-11-29 RX ADMIN — ACETAMINOPHEN 650 MG: 325 TABLET ORAL at 08:40

## 2022-11-29 RX ADMIN — DIPHENHYDRAMINE HYDROCHLORIDE 25 MG: 50 INJECTION, SOLUTION INTRAMUSCULAR; INTRAVENOUS at 08:53

## 2022-11-29 RX ADMIN — SODIUM CHLORIDE 20 ML/HR: 0.9 INJECTION, SOLUTION INTRAVENOUS at 08:31

## 2022-11-29 RX ADMIN — OBINUTUZUMAB 1000 MG: 1000 INJECTION, SOLUTION, CONCENTRATE INTRAVENOUS at 09:29

## 2022-11-29 RX ADMIN — DEXAMETHASONE SODIUM PHOSPHATE 20 MG: 10 INJECTION, SOLUTION INTRAMUSCULAR; INTRAVENOUS at 08:33

## 2022-12-13 ENCOUNTER — HOSPITAL ENCOUNTER (OUTPATIENT)
Dept: INFUSION CENTER | Facility: CLINIC | Age: 67
Discharge: HOME | End: 2022-12-13

## 2022-12-13 VITALS
DIASTOLIC BLOOD PRESSURE: 89 MMHG | RESPIRATION RATE: 18 BRPM | SYSTOLIC BLOOD PRESSURE: 159 MMHG | WEIGHT: 231.48 LBS | TEMPERATURE: 97.6 F | HEART RATE: 77 BPM | BODY MASS INDEX: 30.55 KG/M2

## 2022-12-13 DIAGNOSIS — D80.1 HYPOGAMMAGLOBULINEMIA, ACQUIRED (HCC): ICD-10-CM

## 2022-12-13 DIAGNOSIS — C91.10 CLL (CHRONIC LYMPHOCYTIC LEUKEMIA) (HCC): Primary | ICD-10-CM

## 2022-12-13 RX ORDER — ACETAMINOPHEN 325 MG/1
650 TABLET ORAL ONCE
Status: COMPLETED | OUTPATIENT
Start: 2022-12-13 | End: 2022-12-13

## 2022-12-13 RX ORDER — ACETAMINOPHEN 325 MG/1
650 TABLET ORAL ONCE
OUTPATIENT
Start: 2023-01-10

## 2022-12-13 RX ORDER — SODIUM CHLORIDE 9 MG/ML
20 INJECTION, SOLUTION INTRAVENOUS ONCE
OUTPATIENT
Start: 2023-01-10

## 2022-12-13 RX ORDER — SODIUM CHLORIDE 9 MG/ML
20 INJECTION, SOLUTION INTRAVENOUS ONCE
Status: COMPLETED | OUTPATIENT
Start: 2022-12-13 | End: 2022-12-13

## 2022-12-13 RX ADMIN — DIPHENHYDRAMINE HYDROCHLORIDE 12.5 MG: 50 INJECTION, SOLUTION INTRAMUSCULAR; INTRAVENOUS at 08:22

## 2022-12-13 RX ADMIN — SODIUM CHLORIDE 20 ML/HR: 0.9 INJECTION, SOLUTION INTRAVENOUS at 08:15

## 2022-12-13 RX ADMIN — ACETAMINOPHEN 650 MG: 325 TABLET ORAL at 08:19

## 2022-12-13 RX ADMIN — HYDROCORTISONE SODIUM SUCCINATE 100 MG: 100 INJECTION, POWDER, FOR SOLUTION INTRAMUSCULAR; INTRAVENOUS at 08:19

## 2022-12-13 RX ADMIN — Medication 20 G: at 09:16

## 2022-12-19 DIAGNOSIS — J44.9 CHRONIC OBSTRUCTIVE PULMONARY DISEASE, UNSPECIFIED COPD TYPE (HCC): Primary | ICD-10-CM

## 2022-12-19 DIAGNOSIS — Z79.4 TYPE 2 DIABETES MELLITUS WITHOUT COMPLICATION, WITH LONG-TERM CURRENT USE OF INSULIN (HCC): ICD-10-CM

## 2022-12-19 DIAGNOSIS — E11.9 TYPE 2 DIABETES MELLITUS WITHOUT COMPLICATION, WITH LONG-TERM CURRENT USE OF INSULIN (HCC): ICD-10-CM

## 2022-12-19 RX ORDER — INSULIN DEGLUDEC INJECTION 100 U/ML
14 INJECTION, SOLUTION SUBCUTANEOUS
Qty: 15 ML | Refills: 1 | Status: SHIPPED | OUTPATIENT
Start: 2022-12-19

## 2022-12-19 RX ORDER — ALBUTEROL SULFATE 90 UG/1
2 AEROSOL, METERED RESPIRATORY (INHALATION) EVERY 6 HOURS PRN
Qty: 54 G | Refills: 1 | Status: SHIPPED | OUTPATIENT
Start: 2022-12-19 | End: 2023-09-05

## 2022-12-21 RX ORDER — ACETAMINOPHEN 325 MG/1
650 TABLET ORAL ONCE
Status: CANCELLED | OUTPATIENT
Start: 2023-01-24

## 2022-12-21 RX ORDER — SODIUM CHLORIDE 9 MG/ML
20 INJECTION, SOLUTION INTRAVENOUS ONCE
Status: CANCELLED | OUTPATIENT
Start: 2023-01-24

## 2022-12-23 DIAGNOSIS — Z79.4 TYPE 2 DIABETES MELLITUS WITH DIABETIC POLYNEUROPATHY, WITH LONG-TERM CURRENT USE OF INSULIN (HCC): Primary | ICD-10-CM

## 2022-12-23 DIAGNOSIS — E11.42 TYPE 2 DIABETES MELLITUS WITH DIABETIC POLYNEUROPATHY, WITH LONG-TERM CURRENT USE OF INSULIN (HCC): Primary | ICD-10-CM

## 2022-12-23 RX ORDER — FLASH GLUCOSE SCANNING READER
EACH MISCELLANEOUS
Qty: 1 EACH | Refills: 1 | Status: SHIPPED | OUTPATIENT
Start: 2022-12-23

## 2022-12-26 ENCOUNTER — PATIENT MESSAGE (OUTPATIENT)
Dept: ENDOCRINOLOGY | Facility: CLINIC | Age: 67
End: 2022-12-26

## 2023-01-08 DIAGNOSIS — C91.10 CLL (CHRONIC LYMPHOCYTIC LEUKEMIA) (HCC): ICD-10-CM

## 2023-01-08 DIAGNOSIS — D80.1 HYPOGAMMAGLOBULINEMIA, ACQUIRED (HCC): Primary | ICD-10-CM

## 2023-01-10 ENCOUNTER — HOSPITAL ENCOUNTER (OUTPATIENT)
Dept: INFUSION CENTER | Facility: CLINIC | Age: 68
Discharge: HOME | End: 2023-01-10

## 2023-01-10 VITALS
BODY MASS INDEX: 30.37 KG/M2 | RESPIRATION RATE: 18 BRPM | TEMPERATURE: 97.9 F | SYSTOLIC BLOOD PRESSURE: 148 MMHG | HEART RATE: 75 BPM | DIASTOLIC BLOOD PRESSURE: 90 MMHG | WEIGHT: 230.16 LBS

## 2023-01-10 DIAGNOSIS — Z79.4 TYPE 2 DIABETES MELLITUS WITH DIABETIC POLYNEUROPATHY, WITH LONG-TERM CURRENT USE OF INSULIN (HCC): ICD-10-CM

## 2023-01-10 DIAGNOSIS — D80.1 HYPOGAMMAGLOBULINEMIA, ACQUIRED (HCC): ICD-10-CM

## 2023-01-10 DIAGNOSIS — C91.10 CLL (CHRONIC LYMPHOCYTIC LEUKEMIA) (HCC): Primary | ICD-10-CM

## 2023-01-10 DIAGNOSIS — E11.42 TYPE 2 DIABETES MELLITUS WITH DIABETIC POLYNEUROPATHY, WITH LONG-TERM CURRENT USE OF INSULIN (HCC): ICD-10-CM

## 2023-01-10 DIAGNOSIS — E78.2 MIXED HYPERLIPIDEMIA: ICD-10-CM

## 2023-01-10 LAB
ALBUMIN SERPL BCP-MCNC: 3.9 G/DL (ref 3.5–5)
ALP SERPL-CCNC: 51 U/L (ref 46–116)
ALT SERPL W P-5'-P-CCNC: 51 U/L (ref 12–78)
ANION GAP SERPL CALCULATED.3IONS-SCNC: 9 MMOL/L (ref 4–13)
AST SERPL W P-5'-P-CCNC: 29 U/L (ref 5–45)
BASOPHILS # BLD MANUAL: 0 THOUSAND/UL (ref 0–0.1)
BASOPHILS NFR MAR MANUAL: 0 % (ref 0–1)
BILIRUB SERPL-MCNC: 0.44 MG/DL (ref 0.2–1)
BUN SERPL-MCNC: 20 MG/DL (ref 5–25)
CALCIUM SERPL-MCNC: 9.4 MG/DL (ref 8.3–10.1)
CHLORIDE SERPL-SCNC: 106 MMOL/L (ref 96–108)
CHOLEST SERPL-MCNC: 203 MG/DL
CO2 SERPL-SCNC: 26 MMOL/L (ref 21–32)
CREAT SERPL-MCNC: 1.22 MG/DL (ref 0.6–1.3)
EOSINOPHIL # BLD MANUAL: 0.27 THOUSAND/UL (ref 0–0.4)
EOSINOPHIL NFR BLD MANUAL: 5 % (ref 0–6)
ERYTHROCYTE [DISTWIDTH] IN BLOOD BY AUTOMATED COUNT: 13.3 % (ref 11.6–15.1)
GFR SERPL CREATININE-BSD FRML MDRD: 60 ML/MIN/1.73SQ M
GLUCOSE P FAST SERPL-MCNC: 127 MG/DL (ref 65–99)
GLUCOSE SERPL-MCNC: 127 MG/DL (ref 65–140)
HCT VFR BLD AUTO: 43.7 % (ref 36.5–49.3)
HDLC SERPL-MCNC: 38 MG/DL
HGB BLD-MCNC: 14.5 G/DL (ref 12–17)
IGG SERPL-MCNC: 447 MG/DL (ref 700–1600)
LDLC SERPL CALC-MCNC: 134 MG/DL (ref 0–100)
LYMPHOCYTES # BLD AUTO: 1.07 THOUSAND/UL (ref 0.6–4.47)
LYMPHOCYTES # BLD AUTO: 20 % (ref 14–44)
MCH RBC QN AUTO: 30.9 PG (ref 26.8–34.3)
MCHC RBC AUTO-ENTMCNC: 33.2 G/DL (ref 31.4–37.4)
MCV RBC AUTO: 93 FL (ref 82–98)
MONOCYTES # BLD AUTO: 0.7 THOUSAND/UL (ref 0–1.22)
MONOCYTES NFR BLD: 13 % (ref 4–12)
NEUTROPHILS # BLD MANUAL: 3.33 THOUSAND/UL (ref 1.85–7.62)
NEUTS SEG NFR BLD AUTO: 62 % (ref 43–75)
NONHDLC SERPL-MCNC: 165 MG/DL
PLATELET # BLD AUTO: 166 THOUSANDS/UL (ref 149–390)
PLATELET BLD QL SMEAR: ADEQUATE
PMV BLD AUTO: 11.6 FL (ref 8.9–12.7)
POTASSIUM SERPL-SCNC: 4.2 MMOL/L (ref 3.5–5.3)
PROT SERPL-MCNC: 6.6 G/DL (ref 6.4–8.4)
RBC # BLD AUTO: 4.7 MILLION/UL (ref 3.88–5.62)
RBC MORPH BLD: NORMAL
RETICS # AUTO: ABNORMAL 10*3/UL (ref 14356–105094)
RETICS # CALC: 2.11 % (ref 0.37–1.87)
SODIUM SERPL-SCNC: 141 MMOL/L (ref 135–147)
T4 FREE SERPL-MCNC: 0.84 NG/DL (ref 0.76–1.46)
TRIGL SERPL-MCNC: 156 MG/DL
TSH SERPL DL<=0.05 MIU/L-ACNC: 0.88 UIU/ML (ref 0.45–4.5)
WBC # BLD AUTO: 5.37 THOUSAND/UL (ref 4.31–10.16)

## 2023-01-10 RX ORDER — SODIUM CHLORIDE 9 MG/ML
20 INJECTION, SOLUTION INTRAVENOUS ONCE
Status: COMPLETED | OUTPATIENT
Start: 2023-01-10 | End: 2023-01-10

## 2023-01-10 RX ORDER — SODIUM CHLORIDE 9 MG/ML
20 INJECTION, SOLUTION INTRAVENOUS ONCE
OUTPATIENT
Start: 2023-02-07

## 2023-01-10 RX ORDER — ACETAMINOPHEN 325 MG/1
650 TABLET ORAL ONCE
OUTPATIENT
Start: 2023-02-07

## 2023-01-10 RX ORDER — ACETAMINOPHEN 325 MG/1
650 TABLET ORAL ONCE
Status: COMPLETED | OUTPATIENT
Start: 2023-01-10 | End: 2023-01-10

## 2023-01-10 RX ADMIN — HYDROCORTISONE SODIUM SUCCINATE 100 MG: 100 INJECTION, POWDER, FOR SOLUTION INTRAMUSCULAR; INTRAVENOUS at 08:40

## 2023-01-10 RX ADMIN — ACETAMINOPHEN 650 MG: 325 TABLET ORAL at 08:39

## 2023-01-10 RX ADMIN — Medication 20 G: at 09:12

## 2023-01-10 RX ADMIN — SODIUM CHLORIDE 20 ML/HR: 0.9 INJECTION, SOLUTION INTRAVENOUS at 08:39

## 2023-01-10 RX ADMIN — DIPHENHYDRAMINE HYDROCHLORIDE 12.5 MG: 50 INJECTION, SOLUTION INTRAMUSCULAR; INTRAVENOUS at 08:41

## 2023-01-10 NOTE — PROGRESS NOTES
Pt presents for IVIG  Noted that pt had missed last dose of Gazyva on 12/27  Pt states he had a 24 hour bug of vomiting and diarrhea  Wayne Dyer RN notified, will skip the 12/27 and resume scheduled treatment on 1/24  Labs collected via port  Pt tolerated IVIG without incident  Declined AVS, aware of future appts

## 2023-01-12 LAB
EST. AVERAGE GLUCOSE BLD GHB EST-MCNC: 154 MG/DL
HBA1C MFR BLD: 7 %

## 2023-01-19 DIAGNOSIS — C91.10 CLL (CHRONIC LYMPHOCYTIC LEUKEMIA) (HCC): Primary | ICD-10-CM

## 2023-01-19 RX ORDER — ACETAMINOPHEN 325 MG/1
650 TABLET ORAL ONCE
Status: CANCELLED | OUTPATIENT
Start: 2023-01-24

## 2023-01-19 RX ORDER — SODIUM CHLORIDE 9 MG/ML
20 INJECTION, SOLUTION INTRAVENOUS ONCE
Status: CANCELLED | OUTPATIENT
Start: 2023-01-24

## 2023-01-24 ENCOUNTER — HOSPITAL ENCOUNTER (OUTPATIENT)
Dept: INFUSION CENTER | Facility: CLINIC | Age: 68
Discharge: HOME | End: 2023-01-24

## 2023-01-24 VITALS
HEIGHT: 73 IN | RESPIRATION RATE: 18 BRPM | WEIGHT: 235.45 LBS | BODY MASS INDEX: 31.21 KG/M2 | HEART RATE: 68 BPM | TEMPERATURE: 99.1 F

## 2023-01-24 DIAGNOSIS — C91.10 CLL (CHRONIC LYMPHOCYTIC LEUKEMIA) (HCC): Primary | ICD-10-CM

## 2023-01-24 LAB
ALBUMIN SERPL BCP-MCNC: 4.1 G/DL (ref 3.5–5)
ALP SERPL-CCNC: 51 U/L (ref 34–104)
ALT SERPL W P-5'-P-CCNC: 31 U/L (ref 7–52)
ANION GAP SERPL CALCULATED.3IONS-SCNC: 4 MMOL/L (ref 4–13)
AST SERPL W P-5'-P-CCNC: 24 U/L (ref 13–39)
BASOPHILS # BLD MANUAL: 0.05 THOUSAND/UL (ref 0–0.1)
BASOPHILS NFR MAR MANUAL: 1 % (ref 0–1)
BILIRUB SERPL-MCNC: 0.39 MG/DL (ref 0.2–1)
BUN SERPL-MCNC: 20 MG/DL (ref 5–25)
CALCIUM SERPL-MCNC: 9.1 MG/DL (ref 8.4–10.2)
CHLORIDE SERPL-SCNC: 106 MMOL/L (ref 96–108)
CO2 SERPL-SCNC: 28 MMOL/L (ref 21–32)
CREAT SERPL-MCNC: 1.35 MG/DL (ref 0.6–1.3)
EOSINOPHIL # BLD MANUAL: 0.28 THOUSAND/UL (ref 0–0.4)
EOSINOPHIL NFR BLD MANUAL: 6 % (ref 0–6)
ERYTHROCYTE [DISTWIDTH] IN BLOOD BY AUTOMATED COUNT: 13.5 % (ref 11.6–15.1)
GFR SERPL CREATININE-BSD FRML MDRD: 53 ML/MIN/1.73SQ M
GLUCOSE P FAST SERPL-MCNC: 206 MG/DL (ref 65–99)
GLUCOSE SERPL-MCNC: 206 MG/DL (ref 65–140)
HCT VFR BLD AUTO: 42.1 % (ref 36.5–49.3)
HGB BLD-MCNC: 13.9 G/DL (ref 12–17)
IGG SERPL-MCNC: 545 MG/DL (ref 700–1600)
LYMPHOCYTES # BLD AUTO: 0.99 THOUSAND/UL (ref 0.6–4.47)
LYMPHOCYTES # BLD AUTO: 21 % (ref 14–44)
MCH RBC QN AUTO: 30.8 PG (ref 26.8–34.3)
MCHC RBC AUTO-ENTMCNC: 33 G/DL (ref 31.4–37.4)
MCV RBC AUTO: 93 FL (ref 82–98)
MONOCYTES # BLD AUTO: 0.38 THOUSAND/UL (ref 0–1.22)
MONOCYTES NFR BLD: 8 % (ref 4–12)
NEUTROPHILS # BLD MANUAL: 3.01 THOUSAND/UL (ref 1.85–7.62)
NEUTS SEG NFR BLD AUTO: 64 % (ref 43–75)
PLATELET # BLD AUTO: 156 THOUSANDS/UL (ref 149–390)
PLATELET BLD QL SMEAR: ADEQUATE
PMV BLD AUTO: 11.1 FL (ref 8.9–12.7)
POTASSIUM SERPL-SCNC: 3.9 MMOL/L (ref 3.5–5.3)
PROT SERPL-MCNC: 6.3 G/DL (ref 6.4–8.4)
RBC # BLD AUTO: 4.51 MILLION/UL (ref 3.88–5.62)
RBC MORPH BLD: NORMAL
RETICS # AUTO: ABNORMAL 10*3/UL (ref 14356–105094)
RETICS # CALC: 2.16 % (ref 0.37–1.87)
SODIUM SERPL-SCNC: 138 MMOL/L (ref 135–147)
WBC # BLD AUTO: 4.71 THOUSAND/UL (ref 4.31–10.16)

## 2023-01-24 RX ORDER — SODIUM CHLORIDE 9 MG/ML
20 INJECTION, SOLUTION INTRAVENOUS ONCE
Status: COMPLETED | OUTPATIENT
Start: 2023-01-24 | End: 2023-01-24

## 2023-01-24 RX ORDER — ACETAMINOPHEN 325 MG/1
650 TABLET ORAL ONCE
Status: COMPLETED | OUTPATIENT
Start: 2023-01-24 | End: 2023-01-24

## 2023-01-24 RX ADMIN — DIPHENHYDRAMINE HYDROCHLORIDE 25 MG: 50 INJECTION, SOLUTION INTRAMUSCULAR; INTRAVENOUS at 09:06

## 2023-01-24 RX ADMIN — ACETAMINOPHEN 650 MG: 325 TABLET ORAL at 08:37

## 2023-01-24 RX ADMIN — DEXAMETHASONE SODIUM PHOSPHATE 20 MG: 10 INJECTION, SOLUTION INTRAMUSCULAR; INTRAVENOUS at 08:38

## 2023-01-24 RX ADMIN — SODIUM CHLORIDE 20 ML/HR: 0.9 INJECTION, SOLUTION INTRAVENOUS at 08:35

## 2023-01-24 RX ADMIN — OBINUTUZUMAB 1000 MG: 1000 INJECTION, SOLUTION, CONCENTRATE INTRAVENOUS at 09:37

## 2023-01-24 NOTE — PROGRESS NOTES
Pt presents for gazyva  No new meds or concerns  Pt tolerated infusion without adverse reaction  Future visits discussed  AVS declined

## 2023-01-27 ENCOUNTER — TELEPHONE (OUTPATIENT)
Dept: HEMATOLOGY ONCOLOGY | Facility: CLINIC | Age: 68
End: 2023-01-27

## 2023-01-27 DIAGNOSIS — I10 ESSENTIAL HYPERTENSION: ICD-10-CM

## 2023-01-27 RX ORDER — AMLODIPINE BESYLATE 10 MG/1
10 TABLET ORAL DAILY
Qty: 90 TABLET | Refills: 1 | Status: SHIPPED | OUTPATIENT
Start: 2023-01-27

## 2023-01-27 NOTE — TELEPHONE ENCOUNTER
MEDICATION REFILL ROUTE TO RN    Who is calling? self   If someone other than patient is calling, are they listed on the communication consent form?  self   Medication (name and dose) imbruvica 140mg   How many pills left 6 weeks   Preferred Pharmacy / [address] [facility]   Physician Roberto   Call back number [phone]   Relevant Information  need refill

## 2023-01-30 NOTE — TELEPHONE ENCOUNTER
Returned call to patient  He is checking if his Imbruvica free drug application was submitted for the year  Patient states he usually gets a letter in the mail with an update and never received one  I sent an e-mail to oncology finance to check the status  New Rx can be sent if needed    Reviewed with patient that Rx was sent in November with 6 refills  Patient verbalized understanding

## 2023-02-01 ENCOUNTER — OFFICE VISIT (OUTPATIENT)
Dept: ENDOCRINOLOGY | Facility: CLINIC | Age: 68
End: 2023-02-01

## 2023-02-01 VITALS
BODY MASS INDEX: 31.23 KG/M2 | HEIGHT: 73 IN | WEIGHT: 235.6 LBS | DIASTOLIC BLOOD PRESSURE: 80 MMHG | SYSTOLIC BLOOD PRESSURE: 140 MMHG | HEART RATE: 78 BPM

## 2023-02-01 DIAGNOSIS — E78.2 MIXED HYPERLIPIDEMIA: ICD-10-CM

## 2023-02-01 DIAGNOSIS — E09.9 STEROID-INDUCED DIABETES MELLITUS, SEQUELA (HCC): ICD-10-CM

## 2023-02-01 DIAGNOSIS — Z79.4 TYPE 2 DIABETES MELLITUS WITH DIABETIC POLYNEUROPATHY, WITH LONG-TERM CURRENT USE OF INSULIN (HCC): Primary | ICD-10-CM

## 2023-02-01 DIAGNOSIS — E11.42 TYPE 2 DIABETES MELLITUS WITH DIABETIC POLYNEUROPATHY, WITH LONG-TERM CURRENT USE OF INSULIN (HCC): Primary | ICD-10-CM

## 2023-02-01 DIAGNOSIS — I10 PRIMARY HYPERTENSION: ICD-10-CM

## 2023-02-01 DIAGNOSIS — E11.9 TYPE 2 DIABETES MELLITUS WITHOUT COMPLICATION, WITH LONG-TERM CURRENT USE OF INSULIN (HCC): ICD-10-CM

## 2023-02-01 DIAGNOSIS — T38.0X5S STEROID-INDUCED DIABETES MELLITUS, SEQUELA (HCC): ICD-10-CM

## 2023-02-01 DIAGNOSIS — Z79.4 TYPE 2 DIABETES MELLITUS WITHOUT COMPLICATION, WITH LONG-TERM CURRENT USE OF INSULIN (HCC): ICD-10-CM

## 2023-02-01 PROBLEM — M81.0 AGE RELATED OSTEOPOROSIS: Status: RESOLVED | Noted: 2019-11-27 | Resolved: 2023-02-01

## 2023-02-01 RX ORDER — INSULIN DEGLUDEC INJECTION 100 U/ML
18 INJECTION, SOLUTION SUBCUTANEOUS
Qty: 15 ML | Refills: 1 | Status: SHIPPED | OUTPATIENT
Start: 2023-02-01

## 2023-02-01 NOTE — ASSESSMENT & PLAN NOTE
Diabetes under good control based on last A1C of 7 0 but recently blood sugars have been running higher on CGM  Advised him to do a fingerstick to confirm CGM readings are accurate  Increase Tresiba from 16 units daily to 18 units daily  Advised him to be consistent with tresiba and not fluctuate dosing  Increase Humalog to 12 units before meals and on chemo days use 15 units  Will review CGM data in 2 weeks to make sure blood sugars are improving     Lab Results   Component Value Date    HGBA1C 7 0 (H) 01/10/2023

## 2023-02-01 NOTE — PROGRESS NOTES
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  Reports complications of none  Denies recent illness or hospitalizations  Denies recent severe hypoglycemic or severe hyperglycemic episodes  Denies any issues with his current regimen  home glucose monitoring: are performed regularly    Getting chemo infusions every 2 weeks for CLL and does have steroids as part of that treatment and also taking Prednisone 5mg daily   Last infusion 1/27  Reports that blood sugars have been running higher recently, not sure why  He does take humalog before meals but not always able to take 15 mins prior to eating due to frequently eating out  In the past treated with  Metformin but did not tolerate  CGM Interpretation  Peter [employer]   Device used [place] [id number], Home use   Indication: Type 2 Diabetes  More than 72 hours of data was reviewed  Report to be scanned to chart  Date Range: 1/19/2023-2/1/2023  Analysis of data:   Average Glucose: 205  Coefficient of Variation: 26 8%   Time in Target Range: 35%   Time Above Range: 45% high, 20% very high   Time Below Range: 0%   Interpretation of data:   Blood sugars running high overall and high after meals  Blood sugars highest on Jan 24th- day of chemo infusion    Current regimen:   Tresiba 16 units daily   Humalog 10 units before meals but if running low will reduce to 5 units  Usually eats 2 meals per day  **reports that he will adjust insulin and sometimes if taking more humalog due to a high blood sugar, he will reduce the tresiba dose as well  Last Eye Exam: UTD  Last Foot Exam: Today at visit       Has hypertension: Taking amlodipine and losartan  Has hyperlipidemia: Taking fenofirbate    Hx statin intolerance  Thyroid disorders: None, recent TSH normal  Normal DEXA 5/10/2022    Patient Active Problem List   Diagnosis   • CAD (coronary artery disease)   • CLL (chronic lymphocytic leukemia) ([facility] )   • Hyperlipidemia   • Hypertension   • History of TIA (transient ischemic attack)   • Esophagitis, reflux   • Candida esophagitis ([facility])   • Chronic kidney disease   • H/O blood clots   • Hemolytic anemia ([facility])   • 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)   • Anemia, chronic disease   • Chronic lymphocytic leukemia (HCC)   • Leukopenia due to antineoplastic chemotherapy ([facility])   • Hyperglycemia   • Cellulitis of head or scalp   • Pancytopenia ([facility])   • Headache around the eyes   • Gastroesophageal reflux disease without esophagitis   • Colitis, acute   • Listeria bacteremia   • Thrombocytopenia ([facility])   • Diabetes mellitus ([place] [address] )   • 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[facility])   • Chronic right shoulder pain   • 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[place] [address] )   • Ulcer of esophagus without bleeding   • Esophageal stricture   • Dysphagia   • Esophageal dysphagia   • Acute bursitis of right shoulder   • Hypogammaglobulinemia, acquired ([facility])   • Autoimmune hemolytic anemia ([facility])   • Right upper quadrant abdominal pain   • Chronic obstructive pulmonary disease ([facility])   • Depression, recurrent ([place] [address] )   • Long term (current) use of systemic steroids      Past Medical History:   Diagnosis Date   • Allergic    • Anemia    • Arthritis    • CAD (coronary artery disease) 2004   • Cancer (HCC)     Leukemia   • Cellulitis of scalp    • CKD (chronic kidney disease) stage 3, GFR 30-59 ml/min ([facility])    • CLL (chronic lymphocytic leukemia) ([facility])    • COPD (chronic obstructive pulmonary disease) ([facility])    • Diabetes mellitus ([place] [address] )     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   • Dyslipidemia    • Edema extremities    • Esophageal ulcer    • H/O blood clots    • Hemolytic anemia ([facility])    • Hiatal hernia    • History of TIA (transient ischemic attack)    • Hyperlipidemia    • Hypertension    • Listeria infection 2018   • Multiple gastric ulcers    • Myocardial infarction [facility]) 2004    acute   • Neutropenia ([facility] )    • Obese    • Recurrent sinusitis    • Thrombocytopenia ([facility] )    • Vertigo       Past Surgical History:   Procedure Laterality Date   • ARTHROSCOPIC REPAIR ACL      lt knee   • CARDIAC SURGERY      cardiac cath with stent   • FOOT SURGERY     • IR PORT CHECK  5/17/2019   • KNEE ARTHROSCOPY     • OTHER SURGICAL HISTORY      cardiac cath lesion 1, 1st adjunct treat device: stent   • PORTACATH PLACEMENT     • AK ESOPHAGOGASTRODUODENOSCOPY TRANSORAL DIAGNOSTIC N/A 10/5/2017    Procedure: ESOPHAGOGASTRODUODENOSCOPY (EGD) with bx;  Surgeon: Nixon De Oliveira DO;  Location: [facility]; Service: Gastroenterology   • AK ESOPHAGOGASTRODUODENOSCOPY TRANSORAL DIAGNOSTIC N/A 3/8/2018    Procedure: ESOPHAGOGASTRODUODENOSCOPY (EGD) with biopsy;  Surgeon: Nixon De Oliveira DO;  [facility]: [facility]; Service: Gastroenterology   • AK ESOPHAGOGASTRODUODENOSCOPY TRANSORAL DIAGNOSTIC N/A 6/20/2018    Procedure: ESOPHAGOGASTRODUODENOSCOPY (EGD) with Dilation;  Surgeon: Nixon De Oliveira DO;  [facility]: [facility]; Service: Gastroenterology   • AK ESOPHAGOGASTRODUODENOSCOPY TRANSORAL DIAGNOSTIC N/A 10/18/2018    Procedure: ESOPHAGOGASTRODUODENOSCOPY (EGD) with dilation;  Surgeon: Becky Sky MD;  Location: [facility];   Service: Gastroenterology   • AK ESOPHAGOSCOPY FLEXIBLE TRANSORAL WITH BIOPSY N/A 9/2/2016    Procedure: ESOPHAGOGASTRODUODENOSCOPY (EGD); ESOPHAGEAL DILATION; ESOPHAGEAL BIOPSY;  Surgeon: Tri Masters MD;  Location: [facility];  Service: Thoracic   • TONSILLECTOMY     • UPPER GASTROINTESTINAL ENDOSCOPY      x 6      Family History   Problem Relation Age of Onset   • Leukemia Mother         chronic   • Colon polyps Mother         sidmoid   • Parkinsonism Father      Social History     Tobacco Use   • Smoking status: Former     Packs/day: 2 00     Years: 33 00     Pack years: 66 00     Types: Cigarettes     Start date: 1     Quit date: 2011     Years since quitting: 12 0   • Smokeless tobacco: Never   Substance Use Topics   • Alcohol use:  Yes     Alcohol/week: 2 0 standard drinks     Types: 2 Cans of beer per week     Comment: social use as per [employer]     Allergies   Allergen Reactions   • Heparin Other (See Comments)     Other reaction(s): Blood Disorders  clot   • Macrolides And Ketolides Other (See Comments)     Interacts with other meds he is taking   • Simvastatin Myalgia         Current Outpatient Medications:   •  acyclovir (ZOVIRAX) 400 MG tablet, TAKE 1 TABLET TWICE A DAY, Disp: 60 tablet, Rfl: 11  •  albuterol (2 5 mg/3 mL) 0 083 % nebulizer solution, Take 3 mL (2 5 mg total) by nebulization every 6 (six) hours as needed for wheezing or shortness of breath, Disp: 180 mL, Rfl: 5  •  albuterol (Ventolin HFA) 90 mcg/act inhaler, Inhale 2 puffs every 6 (six) hours as needed for wheezing, Disp: 54 g, Rfl: 1  •  amLODIPine (NORVASC) 10 mg tablet, Take 1 tablet (10 mg total) by mouth daily, Disp: 90 tablet, Rfl: 1  •  aspirin 81 MG tablet, Take 81 mg by mouth every other day Every other day , Disp: , Rfl:   •  citalopram (CeleXA) 40 mg tablet, TAKE 1 TABLET DAILY, Disp: 90 tablet, Rfl: 1  •  Continuous Blood Gluc  (FreeStyle Ashley 2 Alden) JOE, Use to scan sensor premeals and at bedtime, Disp: 1 each, Rfl: 1  •  Continuous Blood Gluc Sensor (FreeStyle Ashley 2 Sensor) MISC, Change every 14 days, Disp: 2 each, Rfl: 0  •  fenofibrate (TRICOR) 145 mg tablet, TAKE 1 TABLET DAILY, Disp: 90 tablet, Rfl: 5  •  folic acid (FOLVITE) 1 mg tablet, Take 800 mcg by mouth daily , Disp: , Rfl:   •  gabapentin (NEURONTIN) 600 MG tablet, TAKE 1 TABLET DAILY, Disp: 90 tablet, Rfl: 3  •  glucose blood (FreeStyle Precision Sujit Test) test strip, Use 1 each 3 (three) times a day, Disp: 50 each, Rfl: 0  •  glucose monitoring kit (FREESTYLE) monitoring kit, 1 each by Does not apply route as needed ( ) E 11 9, Disp: 1 each, Rfl: 0  •  Ibrutinib 140 MG TABS, Take 140 mg by mouth daily, Disp: 30 tablet, Rfl: 6  •  insulin degludec Garland Platts FlexTouch) 100 units/mL injection pen, Inject 18 Units under the skin daily at bedtime, Disp: 15 mL, Rfl: 1  •  insulin lispro (HumaLOG KwikPen) 100 units/mL injection pen, Inject 5-17 Units under the skin 3 (three) times a day with meals as needed for steroid induced hyperglycemia, Disp: 15 mL, Rfl: 2  •  Insulin Pen Needle (BD Pen Needle Inez U/F) 32G X 4 MM MISC, Use 3 (three) times a day, Disp: 300 each, Rfl: 1  •  Lancets (FREESTYLE) lancets, Use as instructed--test 2 times a day  E 11 9, Disp: 100 each, Rfl: 0  •  LORazepam (ATIVAN) 0 5 mg tablet, Take 1 tablet by mouth daily as needed for anxiety, Disp: 30 tablet, Rfl: 0  •  losartan (COZAAR) 100 MG tablet, TAKE 1 TABLET DAILY, Disp: 90 tablet, Rfl: 5  •  mometasone-formoterol (Dulera) 200-5 MCG/ACT inhaler, Inhale 2 puffs 2 (two) times a day Rinse mouth after use  (Patient taking differently: Inhale 2 puffs 2 (two) times a day as needed Rinse mouth after use ), Disp: 13 g, Rfl: 1  •  multivitamin (THERAGRAN) TABS, Take 1 tablet by mouth daily, Disp: , Rfl:   •  naloxone (NARCAN) 4 mg/0 1 mL nasal spray, Administer 1 spray into a nostril   If no response after 2-3 minutes, give another dose in the other nostril using a new spray , Disp: 1 each, Rfl: 1  •  oxyCODONE (ROXICODONE) 10 MG TABS, Take 1 tablet (10 mg total) by mouth every 6 (six) hours as needed for moderate pain For ongoing pain control Max Daily Amount: 40 mg, Disp: 90 tablet, Rfl: 0  •  pantoprazole (PROTONIX) 40 mg tablet, Take 1 tablet (40 mg total) by mouth 2 (two) times a day, Disp: 180 tablet, Rfl: 3  •  predniSONE 5 mg tablet, TAKE 1 TABLET DAILY, Disp: 90 tablet, Rfl: 5  •  sodium chloride, PF, 0 9 %, 10 mL by Intracatheter route see administration instructions 10mL into port before and after ampicillin doses, Disp: , Rfl: 0  •  zolpidem (AMBIEN) 5 mg tablet, Take 1 tablet (5 mg total) by mouth daily at bedtime as needed for sleep, Disp: 30 tablet, Rfl: 0  •  obinutuzumab (GAZYVA) 1000 mg/40 mL SOLN, Infuse into a venous catheter, Disp: , Rfl:     Review of Systems   Constitutional: Negative for activity change, appetite change and fatigue  HENT: Negative for sore throat, trouble swallowing and voice change  Eyes: Negative for visual disturbance  Respiratory: Negative for choking, chest tightness and shortness of breath  Cardiovascular: Negative for chest pain, palpitations and leg swelling  Gastrointestinal: Negative for abdominal pain, constipation and diarrhea  Endocrine: Negative for cold intolerance, heat intolerance, polydipsia, polyphagia and polyuria  Genitourinary: Negative for frequency  Musculoskeletal: Negative for arthralgias and myalgias  Skin: Negative for rash  Neurological: Negative for dizziness and syncope  Hematological: Negative for adenopathy  Psychiatric/Behavioral: Negative for sleep disturbance  All other systems reviewed and are negative  Physical Exam:  Body mass index is 31 09 kg/m²  /80   Pulse 78   Ht 6' 0 99" (1 854 m)   Wt 107 kg (235 lb 9 6 oz)   BMI 31 09 kg/m²    Wt Readings from Last 3 Encounters:   02/01/23 107 kg (235 lb 9 6 oz)   01/24/23 107 kg (235 lb 7 2 oz)   01/10/23 104 kg (230 lb 2 6 oz)       Physical Exam  Vitals reviewed  Constitutional:       General: He is not in acute distress  Appearance: He is well-developed  HENT:      Head: Normocephalic and atraumatic  Eyes:      Conjunctiva/sclera: Conjunctivae normal       Pupils: Pupils are equal, round, and reactive to light  Neck:      Thyroid: No thyromegaly  Cardiovascular:      Rate and Rhythm: Normal rate and regular rhythm  Pulses: no weak pulses          Dorsalis pedis pulses are 2+ on the right side and 2+ on the left side  Posterior tibial pulses are 2+ on the right side and 2+ on the left side  Heart sounds: Normal heart sounds  No murmur heard    Pulmonary:      Effort: Pulmonary effort is normal  No respiratory distress  Breath sounds: Normal breath sounds  No wheezing or rales  Abdominal:      General: Bowel sounds are normal  There is no distension  Palpations: Abdomen is soft  Tenderness: There is no abdominal tenderness  Musculoskeletal:         General: Normal range of motion  Cervical back: Normal range of motion and neck supple  Feet:      Right foot:      Skin integrity: Callus and dry skin present  No ulcer, skin breakdown, erythema or warmth  Left foot:      Skin integrity: Callus and dry skin present  No ulcer, skin breakdown, erythema or warmth  Lymphadenopathy:      Cervical: No cervical adenopathy  Skin:     General: Skin is warm and dry  Neurological:      Mental Status: He is alert and oriented to person, place, and time  Patient's shoes and socks removed  Right Foot/Ankle   Right Foot Inspection  Skin Exam: skin normal, skin intact, dry skin, callus and callus  No warmth, no erythema, no maceration, no abnormal color, no pre-ulcer and no ulcer  Toe Exam: ROM and strength within normal limits  No swelling, no tenderness, erythema and  no right toe deformity    Sensory   Monofilament testing: intact    Vascular  Capillary refills: < 3 seconds  The right DP pulse is 2+  The right PT pulse is 2+  Left Foot/Ankle  Left Foot Inspection  Skin Exam: skin normal, skin intact, dry skin and callus  No warmth, no erythema, no maceration, normal color, no pre-ulcer and no ulcer  Toe Exam: ROM and strength within normal limits  No swelling, no tenderness, no erythema and no left toe deformity  Sensory   Monofilament testing: intact    Vascular  Capillary refills: < 3 seconds  The left DP pulse is 2+  The left PT pulse is 2+       Assign Risk Category  No deformity present  No loss of protective sensation  No weak pulses  Risk: 0      Labs:   Lab Results   Component Value Date    HGBA1C 7 0 (H) 01/10/2023    HGBA1C 7 0 (A) 10/26/2022    HGBA1C 7 6 (H) 02/22/2022     Lab Results   Component Value Date    CREATININE 1 35 (H) 01/24/2023    CREATININE 1 22 01/10/2023    CREATININE 1 08 11/29/2022    BUN 20 01/24/2023     12/29/2015    K 3 9 01/24/2023     01/24/2023    CO2 28 01/24/2023     eGFR   Date Value Ref Range Status   01/24/2023 53 ml/min/1 73sq m Final     Lab Results   Component Value Date    CHOL 122 02/11/2014    HDL 38 (L) 01/10/2023    TRIG 156 (H) 01/10/2023     Lab Results   Component Value Date    ALT 31 01/24/2023    AST 24 01/24/2023    ALKPHOS 51 01/24/2023    BILITOT 0 7 12/29/2015     Lab Results   Component Value Date    YGV2EBSBODOD 0 882 01/10/2023    ZGH0YEHCMTVT 0 984 05/03/2022    QSZ2ZUSBLAET 1 190 12/01/2020     Lab Results   Component Value Date    FREET4 0 84 01/10/2023       Impression & Plan:    Problem List Items Addressed This Visit        Endocrine    Diabetes mellitus ([facility] ) - Primary       Lab Results   Component Value Date    HGBA1C 7 0 (H) 01/10/2023            Relevant Medications    insulin degludec Pollie Redder FlexTouch) 100 units/mL injection pen    Other Relevant Orders    Ambulatory referral to Podiatry    Hemoglobin Y7X    Basic metabolic panel    Microalbumin / creatinine urine ratio    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[facility] )     Diabetes under good control based on last A1C of 7 0 but recently blood sugars have been running higher on CGM  Advised him to do a fingerstick to confirm CGM readings are accurate  Increase Tresiba from 16 units daily to 18 units daily  Advised him to be consistent with tresiba and not fluctuate dosing  Increase Humalog to 12 units before meals and on chemo days use 15 units  Will review CGM data in 2 weeks to make sure blood sugars are improving     Lab Results   Component Value Date    HGBA1C 7 0 (H) 01/10/2023            Relevant Medications    insulin degludec Pollie Redder FlexTouch) 100 units/mL injection pen       Cardiovascular and Mediastinum Hypertension     Continue current regimen  Other    Hyperlipidemia     Continue fenofibrate  He has not tolerated statins  Orders Placed This Encounter   Procedures   • Hemoglobin A1C     Standing Status:   Future     Standing Expiration Date:   2/1/2024   • Basic metabolic panel     This is a patient instruction: Patient fasting for 8 hours or longer recommended  Standing Status:   Future     Standing Expiration Date:   2/1/2024   • Microalbumin / creatinine urine ratio     Standing Status:   Future     Standing Expiration Date:   2/1/2024   • Ambulatory referral to Podiatry     Standing Status:   Future     Standing Expiration Date:   2/1/2024     Referral Priority:   Routine     Referral Type:   Consult - AMB     Referral Reason:   Specialty Services Required     Referred to Provider:   Virginia Moy DPM     Requested Specialty:   Podiatry     Number of Visits Requested:   1     Expiration Date:   2/1/2024       There are no Patient Instructions on file for this visit  Discussed with the patient and all questioned fully answered  He will call me if any problems arise  Follow-up appointment in 3 months       Counseled patient on diagnostic results, prognosis, risk and benefit of treatment options, instruction for management, importance of treatment compliance, Risk  factor reduction and impressions    Ari Ravi PA-C

## 2023-02-06 ENCOUNTER — DOCUMENTATION (OUTPATIENT)
Dept: HEMATOLOGY ONCOLOGY | Facility: CLINIC | Age: 68
End: 2023-02-06

## 2023-02-06 NOTE — PROGRESS NOTES
Order update received --    [facility]  2/6 ? 11:12AM  This order is pending review  [facility]  2/6 ? 11:08AM  Thank you for submitting this order  We've begun processing the order and will be in touch with the customer shortly  Please provide any additional contact information for the patient, i e , email or a secondary number  Notes will be updated and visible as the order is processed to completion  Have a great day! Al Rubio    [facility]  2/6 ? 11:08AM  accepted order successfully   #[id number]    Pt is aware

## 2023-02-06 NOTE — PROGRESS NOTES
ERNESTINEO never received from [facility]  Sent order via [address] [place] [address]

## 2023-02-07 NOTE — PROGRESS NOTES
1-30-23  vd email from providers office regarding call from patient for funding renewal// Last application completed for patient 2017  Completed JJ application forwarded to provider for signature  Call to patient to discuss funding  No response left message    2-6-23  Follow up call to patient, No response  Additional information is required prior to submitting application    [birth year]  Spoke with Mr Jc, he stated he received a letter from [employer] stating a new prescription is needed prior to completing any refill  Informed patient I also have a notice they are requesting updated income information  He will send information to me via email  Received all required documents and signatures  Application faxed to [employer]    2-16-23  [facility] notice from [employer] regarding patients re-enrollment application  Patient has been temporarily approved for free drug eff 2-15-23 thru 4-15-23  Patients approval will be extended through 12-31-23 when patient signs and returns medicare attestation form which was mailed to him  [facility] will dispence medication  Please forward script to them    Call to patient spoke with spouse to inform of approval and advise the importance of returning the medicare attestation form prior to 4-15-23  She acknowledge understanding and stated should patient have any further questions she will have him call me  She stated patient received call yesterday and will receive medication by Wednesday

## 2023-02-08 ENCOUNTER — HOSPITAL ENCOUNTER (OUTPATIENT)
Dept: INFUSION CENTER | Facility: CLINIC | Age: 68
Discharge: HOME | End: 2023-02-08

## 2023-02-08 VITALS
WEIGHT: 231.48 LBS | SYSTOLIC BLOOD PRESSURE: 135 MMHG | RESPIRATION RATE: 18 BRPM | BODY MASS INDEX: 30.55 KG/M2 | TEMPERATURE: 97.4 F | DIASTOLIC BLOOD PRESSURE: 85 MMHG | HEART RATE: 93 BPM

## 2023-02-08 DIAGNOSIS — D80.1 HYPOGAMMAGLOBULINEMIA, ACQUIRED (HCC): ICD-10-CM

## 2023-02-08 DIAGNOSIS — C91.10 CLL (CHRONIC LYMPHOCYTIC LEUKEMIA) (HCC): Primary | ICD-10-CM

## 2023-02-08 RX ORDER — SODIUM CHLORIDE 9 MG/ML
20 INJECTION, SOLUTION INTRAVENOUS ONCE
OUTPATIENT
Start: 2023-03-07

## 2023-02-08 RX ORDER — ACETAMINOPHEN 325 MG/1
650 TABLET ORAL ONCE
OUTPATIENT
Start: 2023-03-07

## 2023-02-08 RX ORDER — SODIUM CHLORIDE 9 MG/ML
20 INJECTION, SOLUTION INTRAVENOUS ONCE
Status: COMPLETED | OUTPATIENT
Start: 2023-02-08 | End: 2023-02-08

## 2023-02-08 RX ORDER — ACETAMINOPHEN 325 MG/1
650 TABLET ORAL ONCE
Status: COMPLETED | OUTPATIENT
Start: 2023-02-08 | End: 2023-02-08

## 2023-02-08 RX ADMIN — ACETAMINOPHEN 650 MG: 325 TABLET ORAL at 08:20

## 2023-02-08 RX ADMIN — HYDROCORTISONE SODIUM SUCCINATE 100 MG: 100 INJECTION, POWDER, FOR SOLUTION INTRAMUSCULAR; INTRAVENOUS at 08:21

## 2023-02-08 RX ADMIN — DIPHENHYDRAMINE HYDROCHLORIDE 12.5 MG: 50 INJECTION, SOLUTION INTRAMUSCULAR; INTRAVENOUS at 08:23

## 2023-02-08 RX ADMIN — Medication 22.5 G: at 09:17

## 2023-02-08 RX ADMIN — SODIUM CHLORIDE 20 ML/HR: 0.9 INJECTION, SOLUTION INTRAVENOUS at 08:19

## 2023-02-13 ENCOUNTER — TELEPHONE (OUTPATIENT)
Dept: HEMATOLOGY ONCOLOGY | Facility: CLINIC | Age: 68
End: 2023-02-13

## 2023-02-13 NOTE — TELEPHONE ENCOUNTER
----- Message from Julieta Raman RN sent at 2/13/2023 10:08 AM EST -----  Regarding: FW: [facility] drugs  Contact: [phone]    ----- Message -----  From: Verenice Omalley  Sent: 2/13/2023  10:06 AM EST  To: [facility]  Subject: [facility] drugs                                       Good day  i am still weighting to here from your office about [facility] drugs  I have not recived any in a long time (3 months)and my back up supply is now 28day and I will have no more in 28 days  please call me and tell me what is going on   [address]

## 2023-02-14 ENCOUNTER — TELEPHONE (OUTPATIENT)
Dept: HEMATOLOGY ONCOLOGY | Facility: CLINIC | Age: 68
End: 2023-02-14

## 2023-02-14 RX ORDER — ACETAMINOPHEN 325 MG/1
650 TABLET ORAL ONCE
Status: CANCELLED | OUTPATIENT
Start: 2023-02-21

## 2023-02-14 RX ORDER — SODIUM CHLORIDE 9 MG/ML
20 INJECTION, SOLUTION INTRAVENOUS ONCE
Status: CANCELLED | OUTPATIENT
Start: 2023-02-21

## 2023-02-14 NOTE — TELEPHONE ENCOUNTER
Anne, my name is Janae Marroquin, [phone]  I needed to speak to doctor Doyle  I'm having a problem with my [address] getting it and I need some  I need some help in figuring out what's going on and I got a good idea, but I need to talk to somebody there  Thank you  Returned phone call of patient  Documentation done on 2/13 by [employer]  Patient states that Dangelo Martinez called him today and he thought she was hinted at something regarding his medication  Clarified that we are very transparent regarding information and try not to hint at things  Patient is worried that he will not be able to afford medication  I will touch base with Dangelo Martinez for additional information  Pt appreciative of this

## 2023-02-16 DIAGNOSIS — C91.10 CHRONIC LYMPHATIC LEUKEMIA (HCC): ICD-10-CM

## 2023-02-21 ENCOUNTER — HOSPITAL ENCOUNTER (OUTPATIENT)
Dept: INFUSION CENTER | Facility: CLINIC | Age: 68
Discharge: HOME | End: 2023-02-21

## 2023-02-21 VITALS
HEIGHT: 73 IN | WEIGHT: 235.45 LBS | SYSTOLIC BLOOD PRESSURE: 172 MMHG | HEART RATE: 80 BPM | BODY MASS INDEX: 31.21 KG/M2 | TEMPERATURE: 96.9 F | RESPIRATION RATE: 18 BRPM | DIASTOLIC BLOOD PRESSURE: 81 MMHG

## 2023-02-21 DIAGNOSIS — C91.10 CLL (CHRONIC LYMPHOCYTIC LEUKEMIA) (HCC): Primary | ICD-10-CM

## 2023-02-21 LAB
ALBUMIN SERPL BCP-MCNC: 4.2 G/DL (ref 3.5–5)
ALP SERPL-CCNC: 57 U/L (ref 34–104)
ALT SERPL W P-5'-P-CCNC: 43 U/L (ref 7–52)
ANION GAP SERPL CALCULATED.3IONS-SCNC: 6 MMOL/L (ref 4–13)
AST SERPL W P-5'-P-CCNC: 30 U/L (ref 13–39)
BASOPHILS # BLD MANUAL: 0.04 THOUSAND/UL (ref 0–0.1)
BASOPHILS NFR MAR MANUAL: 1 % (ref 0–1)
BILIRUB SERPL-MCNC: 0.41 MG/DL (ref 0.2–1)
BUN SERPL-MCNC: 16 MG/DL (ref 5–25)
CALCIUM SERPL-MCNC: 9.4 MG/DL (ref 8.4–10.2)
CHLORIDE SERPL-SCNC: 107 MMOL/L (ref 96–108)
CO2 SERPL-SCNC: 26 MMOL/L (ref 21–32)
CREAT SERPL-MCNC: 1.21 MG/DL (ref 0.6–1.3)
EOSINOPHIL # BLD MANUAL: 0.27 THOUSAND/UL (ref 0–0.4)
EOSINOPHIL NFR BLD MANUAL: 6 % (ref 0–6)
ERYTHROCYTE [DISTWIDTH] IN BLOOD BY AUTOMATED COUNT: 13.4 % (ref 11.6–15.1)
GFR SERPL CREATININE-BSD FRML MDRD: 61 ML/MIN/1.73SQ M
GLUCOSE SERPL-MCNC: 251 MG/DL (ref 65–140)
HCT VFR BLD AUTO: 42.8 % (ref 36.5–49.3)
HGB BLD-MCNC: 14.5 G/DL (ref 12–17)
IGG SERPL-MCNC: 545 MG/DL (ref 700–1600)
LYMPHOCYTES # BLD AUTO: 0.85 THOUSAND/UL (ref 0.6–4.47)
LYMPHOCYTES # BLD AUTO: 19 % (ref 14–44)
MCH RBC QN AUTO: 31.4 PG (ref 26.8–34.3)
MCHC RBC AUTO-ENTMCNC: 33.9 G/DL (ref 31.4–37.4)
MCV RBC AUTO: 93 FL (ref 82–98)
MONOCYTES # BLD AUTO: 0.62 THOUSAND/UL (ref 0–1.22)
MONOCYTES NFR BLD: 14 % (ref 4–12)
NEUTROPHILS # BLD MANUAL: 2.68 THOUSAND/UL (ref 1.85–7.62)
NEUTS SEG NFR BLD AUTO: 60 % (ref 43–75)
PLATELET # BLD AUTO: 163 THOUSANDS/UL (ref 149–390)
PLATELET BLD QL SMEAR: ADEQUATE
PMV BLD AUTO: 11.7 FL (ref 8.9–12.7)
POTASSIUM SERPL-SCNC: 3.7 MMOL/L (ref 3.5–5.3)
PROT SERPL-MCNC: 6.2 G/DL (ref 6.4–8.4)
RBC # BLD AUTO: 4.62 MILLION/UL (ref 3.88–5.62)
RBC MORPH BLD: NORMAL
RETICS # AUTO: ABNORMAL 10*3/UL (ref 14356–105094)
RETICS # CALC: 1.89 % (ref 0.37–1.87)
SODIUM SERPL-SCNC: 139 MMOL/L (ref 135–147)
WBC # BLD AUTO: 4.46 THOUSAND/UL (ref 4.31–10.16)

## 2023-02-21 RX ORDER — ACETAMINOPHEN 325 MG/1
650 TABLET ORAL ONCE
Status: COMPLETED | OUTPATIENT
Start: 2023-02-21 | End: 2023-02-21

## 2023-02-21 RX ORDER — SODIUM CHLORIDE 9 MG/ML
20 INJECTION, SOLUTION INTRAVENOUS ONCE
Status: COMPLETED | OUTPATIENT
Start: 2023-02-21 | End: 2023-02-21

## 2023-02-21 RX ADMIN — OBINUTUZUMAB 1000 MG: 1000 INJECTION, SOLUTION, CONCENTRATE INTRAVENOUS at 09:53

## 2023-02-21 RX ADMIN — DEXAMETHASONE SODIUM PHOSPHATE 20 MG: 10 INJECTION, SOLUTION INTRAMUSCULAR; INTRAVENOUS at 08:37

## 2023-02-21 RX ADMIN — ACETAMINOPHEN 650 MG: 325 TABLET ORAL at 08:36

## 2023-02-21 RX ADMIN — SODIUM CHLORIDE 20 ML/HR: 0.9 INJECTION, SOLUTION INTRAVENOUS at 08:36

## 2023-02-21 RX ADMIN — DIPHENHYDRAMINE HYDROCHLORIDE 25 MG: 50 INJECTION, SOLUTION INTRAMUSCULAR; INTRAVENOUS at 09:02

## 2023-02-22 ENCOUNTER — TELEPHONE (OUTPATIENT)
Dept: OBGYN CLINIC | Facility: OTHER | Age: 68
End: 2023-02-22

## 2023-02-22 NOTE — TELEPHONE ENCOUNTER
Returned telephone call spoke with Pt  When he gets back home he will call our office with jury duty information so that we can fax a letter

## 2023-02-22 NOTE — TELEPHONE ENCOUNTER
CALL RETURN FORM   Reason for patient call? Pt called asking for a letter from [facility] to excuse him from jury duty  Pt stated the letter needs to be submitted by 3/1/23  Patient's primary oncologist? Dr Per Infante    Name of person the patient was calling for? Dr Per Infante staff    Any additional information to add, if applicable? n/a   Informed patient that the message will be forwarded to the team and someone will get back to them as soon as possible    Did you relay this information to the patient?   yes

## 2023-02-25 DIAGNOSIS — E09.9 STEROID-INDUCED DIABETES (HCC): ICD-10-CM

## 2023-02-25 DIAGNOSIS — T38.0X5A STEROID-INDUCED DIABETES (HCC): ICD-10-CM

## 2023-02-27 ENCOUNTER — TELEPHONE (OUTPATIENT)
Dept: ENDOCRINOLOGY | Facility: CLINIC | Age: 68
End: 2023-02-27

## 2023-02-27 RX ORDER — INSULIN LISPRO 100 [IU]/ML
5-17 INJECTION, SOLUTION INTRAVENOUS; SUBCUTANEOUS
Qty: 15 ML | Refills: 0 | Status: SHIPPED | OUTPATIENT
Start: 2023-02-27

## 2023-02-27 NOTE — TELEPHONE ENCOUNTER
Received chart notes from podiatry for diabetic shoes, but did not receive the portion to be completed by our office  Kaylynn [facility] care and notified the   She will have her provider addend the note adding that portion and refax the order to be completed

## 2023-02-28 ENCOUNTER — TELEPHONE (OUTPATIENT)
Dept: HEMATOLOGY ONCOLOGY | Facility: CLINIC | Age: 68
End: 2023-02-28

## 2023-02-28 NOTE — TELEPHONE ENCOUNTER
Telephone call spoke with Yuli Jack  They have received the jurdy duty unable to serve and the pt has been excused  Pt should be getting a letter in the mail with the update  Telephone call to update pt

## 2023-03-01 ENCOUNTER — TELEPHONE (OUTPATIENT)
Dept: HEMATOLOGY ONCOLOGY | Facility: CLINIC | Age: 68
End: 2023-03-01

## 2023-03-01 ENCOUNTER — OFFICE VISIT (OUTPATIENT)
Dept: HEMATOLOGY ONCOLOGY | Facility: CLINIC | Age: 68
End: 2023-03-01

## 2023-03-01 VITALS
SYSTOLIC BLOOD PRESSURE: 118 MMHG | HEART RATE: 77 BPM | HEIGHT: 73 IN | TEMPERATURE: 97.8 F | DIASTOLIC BLOOD PRESSURE: 64 MMHG | WEIGHT: 235 LBS | RESPIRATION RATE: 18 BRPM | OXYGEN SATURATION: 97 % | BODY MASS INDEX: 31.14 KG/M2

## 2023-03-01 DIAGNOSIS — C91.10 CLL (CHRONIC LYMPHOCYTIC LEUKEMIA) (HCC): Primary | ICD-10-CM

## 2023-03-01 DIAGNOSIS — D80.1 HYPOGAMMAGLOBULINEMIA, ACQUIRED (HCC): ICD-10-CM

## 2023-03-01 DIAGNOSIS — D69.6 THROMBOCYTOPENIA (HCC): ICD-10-CM

## 2023-03-01 DIAGNOSIS — D59.10 AUTOIMMUNE HEMOLYTIC ANEMIA (HCC): ICD-10-CM

## 2023-03-01 DIAGNOSIS — G89.3 CANCER RELATED PAIN: ICD-10-CM

## 2023-03-01 DIAGNOSIS — D75.829 HIT (HEPARIN-INDUCED THROMBOCYTOPENIA): ICD-10-CM

## 2023-03-01 RX ORDER — OXYCODONE HYDROCHLORIDE 10 MG/1
10 TABLET ORAL EVERY 6 HOURS PRN
Qty: 28 TABLET | Refills: 0 | Status: SHIPPED | OUTPATIENT
Start: 2023-03-01

## 2023-03-01 NOTE — TELEPHONE ENCOUNTER
Patient said he is missing May and June infusions in [facility]?  He usually goes in the am

## 2023-03-01 NOTE — PROGRESS NOTES
HPI:     Follow-up visit for CLL with history of profound anemia, thrombocytopenia and low IgG level  Presently patient is on Gazyva, low-dose ibrutinib, 140 mg daily, prednisone 5 mg daily, folic acid and IVIG therapy  CLL was diagnosed more than 20 years ago and he had multiple lines of therapies and at 1 time his hemoglobin was down to 4 9 because of  autoimmune hemolytic anemia and CLL  He also has history of heparin-induced thrombocytopenia and he knows that  He has tiredness, chronic nonproductive cough, exertional dyspnea, occasionally wheezing, arthritic symptoms and for that he is on oxycodone and vascular purpura  He has chronic lung disease     For dysphagia he had EGD and dilatation in September 2020 and he follows with his gastroenterologist         He is taking vitamin-D and calcium and tries to stay active to prevent worsening of 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    Gertrude Weaver has coronary artery disease and has 1 stent and he takes 81 mg aspirin daily with food     CLL was diagnosed several years ago  He had been through Fludara based chemotherapy and pentostatin based chemotherapy  He had increased hemolysis when he was on chemotherapy  His most recent chemotherapy treatment was Cytoxan, Oncovin, prednisone and Rituxan and last treatment was in December 2012    In 2013 he was started on Rituxan, prednisone and folic acid because he started to [facility] again  IVIG was tried unsuccessfully so far as hemolysis is concerned  Rituxan has controlled the hemolysis    Information on the treatments from March 2017 onwards  On 3/20/17 patient found to have hemoglobin of 6 2, ,000  He was admitted to [facility] for blood transfusion and plan to transfer to [address] [place] [address]  On 3/20/17 WBC count 195,000, hemoglobin 4 9, platelet count 65  Direct coomb's was positive with undetectable haptoglobin   He was given 2 units of PRBCs, Solu-Medrol 1 g ×3 days and IVIG 1 g/kg daily ×3 days  His hemoglobin continued to drop  Bone marrow biopsy on 3/21 showed marrow cellularity of greater than 95% almost replaced by small lymphocytes, lambda light chain restricted, CD20 positive, CD19 positive, CD23 positive partially expressing CD11c and CD25 and CD5 positive and negative for CD 79 and CD38  He was treated with single dose of chlorambucil to control his CLL   3/22 second dose of chlorambucil, also Rituxan 375 mg/M ² autoimmune hemolytic anemia  WBC continued to rise, 266,000  Patient was initiated with Venetoclax 20 mg daily  Tumor lysis monitored every 8 hours; given aggressive hydration and Lasix and remained euvolemic   Later venetoclax was changed to ibrutinib because of disease progression  Dec 2017- [place] Earnest decreased to 140 mg PO daily due to thrombocytopenia   Devota Means added   4/23 - 4/27/18 patient was admitted due to listeria bacteremia  He was discharged on IV ampicillin   Echo negative for vegetations                              Current Outpatient Medications:   •  acyclovir (ZOVIRAX) 400 MG tablet, TAKE 1 TABLET TWICE A DAY, Disp: 60 tablet, Rfl: 11  •  albuterol (2 5 mg/3 mL) 0 083 % nebulizer solution, Take 3 mL (2 5 mg total) by nebulization every 6 (six) hours as needed for wheezing or shortness of breath, Disp: 180 mL, Rfl: 5  •  albuterol (Ventolin HFA) 90 mcg/act inhaler, Inhale 2 puffs every 6 (six) hours as needed for wheezing, Disp: 54 g, Rfl: 1  •  amLODIPine (NORVASC) 10 mg tablet, Take 1 tablet (10 mg total) by mouth daily, Disp: 90 tablet, Rfl: 1  •  aspirin 81 MG tablet, Take 81 mg by mouth every other day Every other day , Disp: , Rfl:   •  citalopram (CeleXA) 40 mg tablet, TAKE 1 TABLET DAILY, Disp: 90 tablet, Rfl: 1  •  Continuous Blood Gluc  (FreeStyle Ashley 2 [place]) JOE, Use to scan sensor premeals and at bedtime, Disp: 1 each, Rfl: 1  •  Continuous Blood Gluc Sensor (FreeStyle Ashley 2 Sensor) MISC, Change every 14 days, Disp: 2 each, Rfl: 0  •  fenofibrate (TRICOR) 145 mg tablet, TAKE 1 TABLET DAILY, Disp: 90 tablet, Rfl: 5  •  folic acid (FOLVITE) 1 mg tablet, Take 800 mcg by mouth daily , Disp: , Rfl:   •  gabapentin (NEURONTIN) 600 MG tablet, TAKE 1 TABLET DAILY, Disp: 90 tablet, Rfl: 3  •  glucose blood (FreeStyle Precision Sujit Test) test strip, Use 1 each 3 (three) times a day, Disp: 50 each, Rfl: 0  •  glucose monitoring kit (FREESTYLE) monitoring kit, 1 each by Does not apply route as needed ( ) E 11 9, Disp: 1 each, Rfl: 0  •  Ibrutinib 140 MG TABS, Take 140 mg by mouth daily, Disp: 30 tablet, Rfl: 6  •  insulin degludec [place] Holcomb FlexTouch) 100 units/mL injection pen, Inject 18 Units under the skin daily at bedtime, Disp: 15 mL, Rfl: 1  •  insulin lispro (HumaLOG KwikPen) 100 units/mL injection pen, Inject 5-17 Units under the skin 3 (three) times a day with meals as needed for steroid induced hyperglycemia, Disp: 15 mL, Rfl: 0  •  Insulin Pen Needle (BD Pen Needle Inez U/F) 32G X 4 MM MISC, Use 3 (three) times a day, Disp: 300 each, Rfl: 1  •  Lancets (FREESTYLE) lancets, Use as instructed--test 2 times a day  E 11 9, Disp: 100 each, Rfl: 0  •  LORazepam (ATIVAN) 0 5 mg tablet, Take 1 tablet by mouth daily as needed for anxiety, Disp: 30 tablet, Rfl: 0  •  losartan (COZAAR) 100 MG tablet, TAKE 1 TABLET DAILY, Disp: 90 tablet, Rfl: 5  •  mometasone-formoterol (Dulera) 200-5 MCG/ACT inhaler, Inhale 2 puffs 2 (two) times a day Rinse mouth after use  (Patient taking differently: Inhale 2 puffs 2 (two) times a day as needed Rinse mouth after use ), Disp: 13 g, Rfl: 1  •  multivitamin (THERAGRAN) TABS, Take 1 tablet by mouth daily, Disp: , Rfl:   •  naloxone (NARCAN) 4 mg/0 1 mL nasal spray, Administer 1 spray into a nostril   If no response after 2-3 minutes, give another dose in the other nostril using a new spray , Disp: 1 each, Rfl: 1  •  obinutuzumab (GAZYVA) 1000 mg/40 mL SOLN, Infuse into a venous catheter, Disp: , Rfl:   •  oxyCODONE (ROXICODONE) 10 MG TABS, Take 1 tablet (10 mg total) by mouth every 6 (six) hours as needed for moderate pain For ongoing pain control Max Daily Amount: 40 mg, Disp: 90 tablet, Rfl: 0  •  pantoprazole (PROTONIX) 40 mg tablet, Take 1 tablet (40 mg total) by mouth 2 (two) times a day, Disp: 180 tablet, Rfl: 3  •  predniSONE 5 mg tablet, TAKE 1 TABLET DAILY, Disp: 90 tablet, Rfl: 5  •  sodium chloride, PF, 0 9 %, 10 mL by Intracatheter route see administration instructions 10mL into port before and after ampicillin doses, Disp: , Rfl: 0  •  zolpidem (AMBIEN) 5 mg tablet, Take 1 tablet (5 mg total) by mouth daily at bedtime as needed for sleep, Disp: 30 tablet, Rfl: 0    Allergies   Allergen Reactions   • Heparin Other (See Comments)     Other reaction(s): Blood Disorders  clot   • Macrolides And Ketolides Other (See Comments)     Interacts with other meds he is taking   • Simvastatin Myalgia       Oncology History   CLL (chronic lymphocytic leukemia) (HCC)   8/22/2017 -  Chemotherapy    chlorambucil (LEUKERAN), 0 5 mg/kg, Oral, Every 14 days, 6 of 6 cycles  obinutuzumab (GAZYVA) day 1 IVPB, 100 mg, Intravenous, Once, 1 of 1 cycle  obinutuzumab (GAZYVA) day 2 titrated infusion, 900 mg, Intravenous, Once, 1 of 1 cycle  obinutuzumab (GAZYVA) subsequent titrated infusion, 1,000 mg, Intravenous, Once, 54 of 59 cycles  Administration: 1,000 mg (5/21/2019), 1,000 mg (6/18/2019), 1,000 mg (7/16/2019), 1,000 mg (8/13/2019), 1,000 mg (9/10/2019), 1,000 mg (10/8/2019), 1,000 mg (11/5/2019), 1,000 mg (12/3/2019), 1,000 mg (12/31/2019), 1,000 mg (1/28/2020), 1,000 mg (2/25/2020), 1,000 mg (3/24/2020), 1,000 mg (4/21/2020), 1,000 mg (5/19/2020), 1,000 mg (6/16/2020), 1,000 mg (7/14/2020), 1,000 mg (8/11/2020), 1,000 mg (9/9/2020), 1,000 mg (10/6/2020), 1,000 mg (11/3/2020), 1,000 mg (12/1/2020), 1,000 mg (1/26/2021), 1,000 mg (12/29/2020), 1,000 mg (2/23/2021), 1,000 mg (3/23/2021), 1,000 mg (4/20/2021), 1,000 mg (5/18/2021), 1,000 mg (6/15/2021), 1,000 mg (7/13/2021), 1,000 mg (8/10/2021), 1,000 mg (9/7/2021), 1,000 mg (10/5/2021), 1,000 mg (11/2/2021), 1,000 mg (11/30/2021), 1,000 mg (12/28/2021), 1,000 mg (1/25/2022), 1,000 mg (2/22/2022), 1,000 mg (3/22/2022), 1,000 mg (5/17/2022), 1,000 mg (6/14/2022), 1,000 mg (7/12/2022), 1,000 mg (8/9/2022), 1,000 mg (9/6/2022), 1,000 mg (10/4/2022), 1,000 mg (11/1/2022), 1,000 mg (11/29/2022), 1,000 mg (1/24/2023), 1,000 mg (2/21/2023)     4/24/2018 Initial Diagnosis    CLL (chronic lymphocytic leukemia) ([facility] )         ROS:  03/01/23 Reviewed 13 systems: See symptoms in HPI  Presently no other neurological, cardiac, pulmonary, GI and  symptoms other than listed in HPI  Other symptoms are in HPI  No fever, chills, bleeding, bone pains, skin rash, weight loss,  numbness,  claudication and gait problem  No frequent infections but had them before  Not unusually sensitive to heat or cold  No swelling of the ankles  No swollen glands  Patient is anxious  /64 (BP Location: Left arm, Patient Position: Sitting, Cuff Size: Adult)   Pulse 77   Temp 97 8 °F (36 6 °C) (Temporal)   Resp 18   Ht 6' 0 99" (1 854 m)   Wt 107 kg (235 lb)   SpO2 97%   BMI 31 01 kg/m²     Physical Exam:  Alert and oriented and not in distress  Vitals are above  No icterus, no oral thrush, no palpable neck mass, clear lung fields to percussion and auscultation, regular heart rate, abdomen is soft and non tender, no palpable abdominal mass, no ascites, no edema of ankles, no calf tenderness, no focal neurological deficit, no skin rash, no palpable lymphadenopathy in the neck and axillary areas,  no clubbing  Patient is anxious  Performance status 2    Vascular purpura on forearms    IMAGING:      LABS:    Results for orders placed or performed during the hospital encounter of 02/21/23   IgG   Result Value Ref Range     0 (L) 700 0 - 1,600 0 mg/dL   Retic Count   Result Value Ref Range Retic Ct Abs 87,300 14,356 - 105,094    Retic Ct Pct 1 89 (H) 0 37 - 1 87 %   CBC and differential   Result Value Ref Range    WBC 4 46 4 31 - 10 16 Thousand/uL    RBC 4 62 3 88 - 5 62 Million/uL    Hemoglobin 14 5 12 0 - 17 0 g/dL    Hematocrit 42 8 36 5 - 49 3 %    MCV 93 82 - 98 fL    MCH 31 4 26 8 - 34 3 pg    MCHC 33 9 31 4 - 37 4 g/dL    RDW 13 4 11 6 - 15 1 %    MPV 11 7 8 9 - 12 7 fL    Platelets 808 219 - 011 Thousands/uL   Comprehensive metabolic panel   Result Value Ref Range    Sodium 139 135 - 147 mmol/L    Potassium 3 7 3 5 - 5 3 mmol/L    Chloride 107 96 - 108 mmol/L    CO2 26 21 - 32 mmol/L    ANION GAP 6 4 - 13 mmol/L    BUN 16 5 - 25 mg/dL    Creatinine 1 21 0 60 - 1 30 mg/dL    Glucose 251 (H) 65 - 140 mg/dL    Calcium 9 4 8 4 - 10 2 mg/dL    AST 30 13 - 39 U/L    ALT 43 7 - 52 U/L    Alkaline Phosphatase 57 34 - 104 U/L    Total Protein 6 2 (L) 6 4 - 8 4 g/dL    Albumin 4 2 3 5 - 5 0 g/dL    Total Bilirubin 0 41 0 20 - 1 00 mg/dL    eGFR 61 ml/min/1 73sq m   Manual Differential(PHLEBS Do Not Order)   Result Value Ref Range    Segmented % 60 43 - 75 %    Lymphocytes % 19 14 - 44 %    Monocytes % 14 (H) 4 - 12 %    Eosinophils, % 6 0 - 6 %    Basophils % 1 0 - 1 %    Absolute Neutrophils 2 68 1 85 - 7 62 Thousand/uL    Lymphocytes Absolute 0 85 0 60 - 4 47 Thousand/uL    Monocytes Absolute 0 62 0 00 - 1 22 Thousand/uL    Eosinophils Absolute 0 27 0 00 - 0 40 Thousand/uL    Basophils Absolute 0 04 0 00 - 0 10 Thousand/uL    Total Counted      RBC Morphology Normal     Platelet Estimate Adequate Adequate     *Note: Due to a large number of results and/or encounters for the requested time period, some results have not been displayed  A complete set of results can be found in Results Review      Labs, Imaging, & Other studies:   All pertinent labs and imaging studies were personally reviewed      Reviewed  CBC D, CMP, reticulocyte and IgG and discussed with patient       Assessment and plan: Follow-up visit for CLL with history of profound anemia, thrombocytopenia and low IgG level  Presently patient is on Gazyva, low-dose ibrutinib, 140 mg daily, prednisone 5 mg daily, folic acid and IVIG therapy  CLL was diagnosed more than 20 years ago and he had multiple lines of therapies and at 1 time his hemoglobin was down to 4 9 because of  autoimmune hemolytic anemia and CLL  He also has history of heparin-induced thrombocytopenia and he knows that  He has tiredness, chronic nonproductive cough, exertional dyspnea, occasionally wheezing, arthritic symptoms and for that he is on oxycodone and vascular purpura  He has chronic lung disease     For dysphagia he had EGD and dilatation in September 2020 and he follows with his gastroenterologist         He is taking vitamin-D and calcium and tries to stay active to prevent worsening of 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    [facility] has coronary artery disease and has 1 stent and he takes 81 mg aspirin daily with food     CLL was diagnosed several years ago  He had been through Fludara based chemotherapy and pentostatin based chemotherapy  He had increased hemolysis when he was on chemotherapy  His most recent chemotherapy treatment was Cytoxan, Oncovin, prednisone and Rituxan and last treatment was in December 2012    In 2013 he was started on Rituxan, prednisone and folic acid because he started to [facility] again  IVIG was tried unsuccessfully so far as hemolysis is concerned  Rituxan has controlled the hemolysis    Information on the treatments from March 2017 onwards  On 3/20/17 patient found to have hemoglobin of 6 2, ,000  He was admitted to [facility] for blood transfusion and plan to transfer to [address] [place] [address]  On 3/20/17 WBC count 195,000, hemoglobin 4 9, platelet count 65  Direct coomb's was positive with undetectable haptoglobin   He was given 2 units of PRBCs, Solu-Medrol 1 g ×3 days and IVIG 1 g/kg daily ×3 days  His hemoglobin continued to drop  Bone marrow biopsy on 3/21 showed marrow cellularity of greater than 95% almost replaced by small lymphocytes, lambda light chain restricted, CD20 positive, CD19 positive, CD23 positive partially expressing CD11c and CD25 and CD5 positive and negative for CD 79 and CD38  He was treated with single dose of chlorambucil to control his CLL   3/22 second dose of chlorambucil, also Rituxan 375 mg/M ² autoimmune hemolytic anemia  WBC continued to rise, 266,000  Patient was initiated with Venetoclax 20 mg daily  Tumor lysis monitored every 8 hours; given aggressive hydration and Lasix and remained euvolemic   Later venetoclax was changed to ibrutinib because of disease progression  Dec 2017- [place] decreased to 140 mg PO daily due to thrombocytopenia   Koko Jarrett added   4/23 - 4/27/18 patient was admitted due to listeria bacteremia  He was discharged on IV ampicillin  Echo negative for vegetations        Physical examination and test results are as recorded and discussed in very much detail  Hematologically stable  No change in therapy  All discussed in detail  Questions answered  Renewed oxycodone  Discussed the importance of eating healthy foods, staying active and health screening tests  Goal is prolongation of survival and prevention of complications  Patient is capable of self-care  Discussed precautions against coronavirus and other infections  Patient is immunocompromised   Brooks Garcia See diagnoses, orders and instructions    1  CLL (chronic lymphocytic leukemia) ([facility] [address] )      2  Autoimmune hemolytic anemia (HCC)      3  Thrombocytopenia ([facility] [address] )      4  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)      5  Hypogammaglobulinemia, acquired ([facility] [address] )      6   Cancer related pain    - oxyCODONE (ROXICODONE) 10 MG TABS; Take 1 tablet (10 mg total) by mouth every 6 (six) hours as needed for moderate pain For ongoing pain control Max Daily Amount: 40 mg  Dispense: 28 tablet; Refill: 0       No change in blood work and treatment  He gets Gazyva at the infusion center every 4 weeks  No change in IVIG therapy  No change in ibrutinib  Reviewed oxycodone prescription  Follow-up in 3 months          Patient will continue to follow with his primary physician and other consultants      I used dictation device to dictate this note and there could be mistakes in my note and for that he may call my office      Patient voiced understanding and agreed      Counseling / Coordination of Care      Provided counseling and support

## 2023-03-01 NOTE — PATIENT INSTRUCTIONS
No change in blood work and treatment  He gets Gazyva at the infusion center every 4 weeks  No change in IVIG therapy  No change in ibrutinib  Reviewed oxycodone prescription    Follow-up in 3 months

## 2023-03-03 PROBLEM — G89.3 CANCER RELATED PAIN: Status: ACTIVE | Noted: 2023-03-03

## 2023-03-07 ENCOUNTER — HOSPITAL ENCOUNTER (OUTPATIENT)
Dept: INFUSION CENTER | Facility: CLINIC | Age: 68
Discharge: HOME | End: 2023-03-07

## 2023-03-07 VITALS
RESPIRATION RATE: 18 BRPM | HEART RATE: 94 BPM | DIASTOLIC BLOOD PRESSURE: 98 MMHG | TEMPERATURE: 98 F | SYSTOLIC BLOOD PRESSURE: 137 MMHG

## 2023-03-07 DIAGNOSIS — C91.10 CLL (CHRONIC LYMPHOCYTIC LEUKEMIA) (HCC): Primary | ICD-10-CM

## 2023-03-07 DIAGNOSIS — D80.1 HYPOGAMMAGLOBULINEMIA, ACQUIRED (HCC): ICD-10-CM

## 2023-03-07 RX ORDER — SODIUM CHLORIDE 9 MG/ML
20 INJECTION, SOLUTION INTRAVENOUS ONCE
Status: COMPLETED | OUTPATIENT
Start: 2023-03-07 | End: 2023-03-07

## 2023-03-07 RX ORDER — ACETAMINOPHEN 325 MG/1
650 TABLET ORAL ONCE
Status: COMPLETED | OUTPATIENT
Start: 2023-03-07 | End: 2023-03-07

## 2023-03-07 RX ORDER — ACETAMINOPHEN 325 MG/1
650 TABLET ORAL ONCE
OUTPATIENT
Start: 2023-03-08

## 2023-03-07 RX ORDER — SODIUM CHLORIDE 9 MG/ML
20 INJECTION, SOLUTION INTRAVENOUS ONCE
OUTPATIENT
Start: 2023-03-08

## 2023-03-07 RX ADMIN — DIPHENHYDRAMINE HYDROCHLORIDE 12.5 MG: 50 INJECTION, SOLUTION INTRAMUSCULAR; INTRAVENOUS at 08:28

## 2023-03-07 RX ADMIN — HYDROCORTISONE SODIUM SUCCINATE 100 MG: 100 INJECTION, POWDER, FOR SOLUTION INTRAMUSCULAR; INTRAVENOUS at 08:28

## 2023-03-07 RX ADMIN — ACETAMINOPHEN 650 MG: 325 TABLET ORAL at 08:28

## 2023-03-07 RX ADMIN — Medication 22.5 G: at 09:09

## 2023-03-07 RX ADMIN — SODIUM CHLORIDE 20 ML/HR: 0.9 INJECTION, SOLUTION INTRAVENOUS at 08:27

## 2023-03-07 NOTE — PROGRESS NOTES
Patient arrived to unit without complaint  Patient tolerated IVIG without incident  AVS declined and patient aware of next appointment  Patient left in stable condition

## 2023-03-17 RX ORDER — SODIUM CHLORIDE 9 MG/ML
20 INJECTION, SOLUTION INTRAVENOUS ONCE
Status: CANCELLED | OUTPATIENT
Start: 2023-03-21

## 2023-03-17 RX ORDER — ACETAMINOPHEN 325 MG/1
650 TABLET ORAL ONCE
Status: CANCELLED | OUTPATIENT
Start: 2023-03-21

## 2023-03-21 ENCOUNTER — HOSPITAL ENCOUNTER (OUTPATIENT)
Dept: INFUSION CENTER | Facility: CLINIC | Age: 68
Discharge: HOME | End: 2023-03-21

## 2023-03-21 ENCOUNTER — RA CDI HCC (OUTPATIENT)
Dept: OTHER | Facility: HOSPITAL | Age: 68
End: 2023-03-21

## 2023-03-21 VITALS
SYSTOLIC BLOOD PRESSURE: 155 MMHG | WEIGHT: 235.45 LBS | TEMPERATURE: 97.4 F | BODY MASS INDEX: 31.21 KG/M2 | HEART RATE: 80 BPM | DIASTOLIC BLOOD PRESSURE: 84 MMHG | HEIGHT: 73 IN | RESPIRATION RATE: 16 BRPM

## 2023-03-21 DIAGNOSIS — C91.10 CLL (CHRONIC LYMPHOCYTIC LEUKEMIA) (HCC): Primary | ICD-10-CM

## 2023-03-21 LAB
ALBUMIN SERPL BCP-MCNC: 4.2 G/DL (ref 3.5–5)
ALP SERPL-CCNC: 42 U/L (ref 34–104)
ALT SERPL W P-5'-P-CCNC: 44 U/L (ref 7–52)
ANION GAP SERPL CALCULATED.3IONS-SCNC: 7 MMOL/L (ref 4–13)
AST SERPL W P-5'-P-CCNC: 33 U/L (ref 13–39)
BASOPHILS # BLD MANUAL: 0 THOUSAND/UL (ref 0–0.1)
BASOPHILS NFR MAR MANUAL: 0 % (ref 0–1)
BILIRUB SERPL-MCNC: 0.62 MG/DL (ref 0.2–1)
BUN SERPL-MCNC: 16 MG/DL (ref 5–25)
CALCIUM SERPL-MCNC: 9.2 MG/DL (ref 8.4–10.2)
CHLORIDE SERPL-SCNC: 107 MMOL/L (ref 96–108)
CO2 SERPL-SCNC: 28 MMOL/L (ref 21–32)
CREAT SERPL-MCNC: 1.06 MG/DL (ref 0.6–1.3)
EOSINOPHIL # BLD MANUAL: 0.23 THOUSAND/UL (ref 0–0.4)
EOSINOPHIL NFR BLD MANUAL: 5 % (ref 0–6)
ERYTHROCYTE [DISTWIDTH] IN BLOOD BY AUTOMATED COUNT: 13.4 % (ref 11.6–15.1)
GFR SERPL CREATININE-BSD FRML MDRD: 71 ML/MIN/1.73SQ M
GLUCOSE P FAST SERPL-MCNC: 87 MG/DL (ref 65–99)
GLUCOSE SERPL-MCNC: 87 MG/DL (ref 65–140)
HCT VFR BLD AUTO: 44.4 % (ref 36.5–49.3)
HGB BLD-MCNC: 14.8 G/DL (ref 12–17)
IGG SERPL-MCNC: 555 MG/DL (ref 700–1600)
LYMPHOCYTES # BLD AUTO: 1.01 THOUSAND/UL (ref 0.6–4.47)
LYMPHOCYTES # BLD AUTO: 22 % (ref 14–44)
MCH RBC QN AUTO: 31.5 PG (ref 26.8–34.3)
MCHC RBC AUTO-ENTMCNC: 33.3 G/DL (ref 31.4–37.4)
MCV RBC AUTO: 95 FL (ref 82–98)
METAMYELOCYTES NFR BLD MANUAL: 2 % (ref 0–1)
MONOCYTES # BLD AUTO: 0.51 THOUSAND/UL (ref 0–1.22)
MONOCYTES NFR BLD: 11 % (ref 4–12)
NEUTROPHILS # BLD MANUAL: 2.76 THOUSAND/UL (ref 1.85–7.62)
NEUTS SEG NFR BLD AUTO: 60 % (ref 43–75)
PLATELET # BLD AUTO: 151 THOUSANDS/UL (ref 149–390)
PLATELET BLD QL SMEAR: ADEQUATE
PMV BLD AUTO: 11.5 FL (ref 8.9–12.7)
POTASSIUM SERPL-SCNC: 3.8 MMOL/L (ref 3.5–5.3)
PROT SERPL-MCNC: 6.1 G/DL (ref 6.4–8.4)
RBC # BLD AUTO: 4.7 MILLION/UL (ref 3.88–5.62)
RETICS # AUTO: ABNORMAL 10*3/UL (ref 14356–105094)
RETICS # CALC: 2.11 % (ref 0.37–1.87)
SODIUM SERPL-SCNC: 142 MMOL/L (ref 135–147)
WBC # BLD AUTO: 4.6 THOUSAND/UL (ref 4.31–10.16)

## 2023-03-21 RX ORDER — SODIUM CHLORIDE 9 MG/ML
20 INJECTION, SOLUTION INTRAVENOUS ONCE
Status: COMPLETED | OUTPATIENT
Start: 2023-03-21 | End: 2023-03-21

## 2023-03-21 RX ORDER — ACETAMINOPHEN 325 MG/1
650 TABLET ORAL ONCE
Status: COMPLETED | OUTPATIENT
Start: 2023-03-21 | End: 2023-03-21

## 2023-03-21 RX ADMIN — OBINUTUZUMAB 1000 MG: 1000 INJECTION, SOLUTION, CONCENTRATE INTRAVENOUS at 09:43

## 2023-03-21 RX ADMIN — SODIUM CHLORIDE 20 ML/HR: 0.9 INJECTION, SOLUTION INTRAVENOUS at 08:41

## 2023-03-21 RX ADMIN — DEXAMETHASONE SODIUM PHOSPHATE 20 MG: 10 INJECTION, SOLUTION INTRAMUSCULAR; INTRAVENOUS at 09:06

## 2023-03-21 RX ADMIN — DIPHENHYDRAMINE HYDROCHLORIDE 25 MG: 50 INJECTION, SOLUTION INTRAMUSCULAR; INTRAVENOUS at 08:43

## 2023-03-21 RX ADMIN — ACETAMINOPHEN 650 MG: 325 TABLET ORAL at 08:42

## 2023-03-21 NOTE — PROGRESS NOTES
Patient arrived on unit for [facility]  Denies any present issues/concerns  Labs drawn as ordered  Tolerated infusion without incident  Aware of next appointment   Declined AVS

## 2023-03-21 NOTE — PROGRESS NOTES
[facility] [address]  coding opportunities        E11 [id number]  Chart Reviewed number of suggestions sent to Provider: 1     Patients Insurance     Medicare Insurance: [employer]

## 2023-03-27 ENCOUNTER — OFFICE VISIT (OUTPATIENT)
Dept: FAMILY MEDICINE CLINIC | Facility: CLINIC | Age: 68
End: 2023-03-27

## 2023-03-27 VITALS
DIASTOLIC BLOOD PRESSURE: 74 MMHG | OXYGEN SATURATION: 96 % | BODY MASS INDEX: 30.31 KG/M2 | SYSTOLIC BLOOD PRESSURE: 120 MMHG | HEART RATE: 94 BPM | HEIGHT: 74 IN | TEMPERATURE: 98.2 F | WEIGHT: 236.2 LBS

## 2023-03-27 DIAGNOSIS — K21.00 GASTROESOPHAGEAL REFLUX DISEASE WITH ESOPHAGITIS, UNSPECIFIED WHETHER HEMORRHAGE: ICD-10-CM

## 2023-03-27 DIAGNOSIS — J31.0 CHRONIC RHINITIS: ICD-10-CM

## 2023-03-27 DIAGNOSIS — N18.30 STAGE 3 CHRONIC KIDNEY DISEASE, UNSPECIFIED WHETHER STAGE 3A OR 3B CKD (HCC): ICD-10-CM

## 2023-03-27 DIAGNOSIS — E78.2 MIXED HYPERLIPIDEMIA: ICD-10-CM

## 2023-03-27 DIAGNOSIS — F33.0 MILD EPISODE OF RECURRENT MAJOR DEPRESSIVE DISORDER (HCC): ICD-10-CM

## 2023-03-27 DIAGNOSIS — Z00.00 MEDICARE ANNUAL WELLNESS VISIT, SUBSEQUENT: Primary | ICD-10-CM

## 2023-03-27 DIAGNOSIS — F11.20 CONTINUOUS OPIOID DEPENDENCE (HCC): ICD-10-CM

## 2023-03-27 DIAGNOSIS — Z79.4 TYPE 2 DIABETES MELLITUS WITH DIABETIC POLYNEUROPATHY, WITH LONG-TERM CURRENT USE OF INSULIN (HCC): ICD-10-CM

## 2023-03-27 DIAGNOSIS — N18.9 CHRONIC KIDNEY DISEASE, UNSPECIFIED CKD STAGE: ICD-10-CM

## 2023-03-27 DIAGNOSIS — I10 PRIMARY HYPERTENSION: ICD-10-CM

## 2023-03-27 DIAGNOSIS — C91.10 CLL (CHRONIC LYMPHOCYTIC LEUKEMIA) (HCC): ICD-10-CM

## 2023-03-27 DIAGNOSIS — J44.9 CHRONIC OBSTRUCTIVE PULMONARY DISEASE, UNSPECIFIED COPD TYPE (HCC): ICD-10-CM

## 2023-03-27 DIAGNOSIS — E11.42 TYPE 2 DIABETES MELLITUS WITH DIABETIC POLYNEUROPATHY, WITH LONG-TERM CURRENT USE OF INSULIN (HCC): ICD-10-CM

## 2023-03-27 RX ORDER — BENZONATATE 200 MG/1
200 CAPSULE ORAL 3 TIMES DAILY PRN
Qty: 20 CAPSULE | Refills: 0 | Status: SHIPPED | OUTPATIENT
Start: 2023-03-27

## 2023-03-27 RX ORDER — FLUTICASONE PROPIONATE 50 MCG
2 SPRAY, SUSPENSION (ML) NASAL DAILY
Qty: 16 G | Refills: 0
Start: 2023-03-27

## 2023-03-27 NOTE — PROGRESS NOTES
Assessment and Plan:     Problem List Items Addressed This Visit        Digestive    Esophagitis, reflux       Endocrine    Diabetes mellitus (HCC)       Respiratory    Chronic obstructive pulmonary disease (HCC)       Cardiovascular and Mediastinum    Hypertension       Genitourinary    Chronic kidney disease       Other    CLL (chronic lymphocytic leukemia) (HCC)    Hyperlipidemia    Depression, recurrent ([facility] )   Other Visit Diagnoses     Medicare annual wellness visit, subsequent    -  Primary           Preventive health issues were discussed with patient, and age appropriate screening tests were ordered as noted in patient's After Visit Summary  Personalized health advice and appropriate referrals for health education or preventive services given if needed, as noted in patient's After Visit Summary       History of Present Illness:     Patient presents for a Medicare Wellness Visit    HPI   Patient Care Team:  Agustin Eller DO as PCP - General (Family Medicine)  Christyne Poisson, MD Lonza Abbey, MD Angus Rainwater, MD Suzanna Punter, MD Sherren Factor, MD Ben Quinton, MD (Cardiology)  Harrison Aguilar MD (Orthopedic Surgery)  Ann-Marie Ramos MD (Endocrinology)  Braden Werner MD (Cardiology)     Review of Systems:     Review of Systems     Problem List:     Patient Active Problem List   Diagnosis   • CAD (coronary artery disease)   • CLL (chronic lymphocytic leukemia) ([facility] )   • Hyperlipidemia   • Hypertension   • History of TIA (transient ischemic attack)   • Esophagitis, reflux   • Candida esophagitis ([facility] )   • Chronic kidney disease   • H/O blood clots   • Hemolytic anemia (HCC)   • 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)   • Anemia, chronic disease   • Chronic lymphocytic leukemia ([facility] )   • Leukopenia due to antineoplastic chemotherapy ([facility] )   • Hyperglycemia   • Cellulitis of head or scalp   • Pancytopenia ([facility] )   • Headache around the eyes   • Gastroesophageal reflux disease without esophagitis   • Colitis, acute   • Listeria bacteremia   • Thrombocytopenia (HCC)   • Diabetes mellitus ([facility] )   • 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HCC)   • Chronic right shoulder pain   • 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[facility] )   • Ulcer of esophagus without bleeding   • Esophageal stricture   • Dysphagia   • Esophageal dysphagia   • Acute bursitis of right shoulder   • Hypogammaglobulinemia, acquired (HCC)   • Autoimmune hemolytic anemia (HCC)   • Right upper quadrant abdominal pain   • Chronic obstructive pulmonary disease (HCC)   • Depression, recurrent (HCC)   • Long term (current) use of systemic steroids   • Cancer related pain      Past Medical and Surgical History:     Past Medical History:   Diagnosis Date   • Allergic    • Anemia    • Arthritis    • CAD (coronary artery disease) 2004   • Cancer (HCC)     Leukemia   • Cellulitis of scalp    • CKD (chronic kidney disease) stage 3, GFR 30-59 ml/min (HCC)    • CLL (chronic lymphocytic leukemia) (HCC)    • COPD (chronic obstructive pulmonary disease) (HCC)    • Diabetes mellitus ([facility] )     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   • Dyslipidemia    • Edema extremities    • Esophageal ulcer    • H/O blood clots    • Hemolytic anemia (HCC)    • Hiatal hernia    • History of TIA (transient ischemic attack)    • Hyperlipidemia    • Hypertension    • Listeria infection 2018   • Multiple gastric ulcers    • Myocardial infarction [facility]) 2004    acute   • Neutropenia (HCC)    • Obese    • Recurrent sinusitis    • Thrombocytopenia (HCC)    • Vertigo      Past Surgical History:   Procedure Laterality Date   • ARTHROSCOPIC REPAIR ACL      lt knee   • CARDIAC SURGERY      cardiac cath with stent   • FOOT SURGERY     • IR PORT CHECK  5/17/2019   • KNEE ARTHROSCOPY     • OTHER SURGICAL HISTORY      cardiac cath lesion 1, 1st adjunct treat device: stent   • PORTACATH PLACEMENT     • ID ESOPHAGOGASTRODUODENOSCOPY TRANSORAL DIAGNOSTIC N/A 10/5/2017    Procedure: ESOPHAGOGASTRODUODENOSCOPY (EGD) with bx;  Surgeon: Jam Solomon DO;  Location: [facility]; Service: Gastroenterology   • ID ESOPHAGOGASTRODUODENOSCOPY TRANSORAL DIAGNOSTIC N/A 3/8/2018    Procedure: ESOPHAGOGASTRODUODENOSCOPY (EGD) with biopsy;  Surgeon: Jam Solomon DO;  Location: [facility]; Service: Gastroenterology   • ID ESOPHAGOGASTRODUODENOSCOPY TRANSORAL DIAGNOSTIC N/A 2018    Procedure: ESOPHAGOGASTRODUODENOSCOPY (EGD) with Dilation;  Surgeon: Jam Solomon DO;  Location: [facility]; Service: Gastroenterology   • ID ESOPHAGOGASTRODUODENOSCOPY TRANSORAL DIAGNOSTIC N/A 10/18/2018    Procedure: ESOPHAGOGASTRODUODENOSCOPY (EGD) with dilation;  Surgeon: Richard Dill MD;  Location: [facility]; Service: Gastroenterology   • ID ESOPHAGOSCOPY FLEXIBLE TRANSORAL WITH BIOPSY N/A 2016    Procedure: ESOPHAGOGASTRODUODENOSCOPY (EGD); ESOPHAGEAL DILATION; ESOPHAGEAL BIOPSY;  Surgeon: Eliceo Vinson MD;  Location:  MAIN OR;  Service: Thoracic   • TONSILLECTOMY     • UPPER GASTROINTESTINAL ENDOSCOPY      x 6      Family History:     Family History   Problem Relation Age of Onset   • Leukemia Mother         chronic   • Colon polyps Mother         sidmoid   • Parkinsonism Father       Social History:     Social History     Socioeconomic History   • Marital status: /Civil Union     Spouse name: None   • Number of children: None   • Years of education: None   • Highest education level: None   Occupational History   • Occupation: [employer]   • Occupation: retired    Tobacco Use   • Smoking status: Former     Packs/day: 2 00     Years: 33 00     Pack years: 66 00     Types: Cigarettes     Start date:      Quit date:      Years since quittin 2   • Smokeless tobacco: Never   Vaping Use   • Vaping Use: Never used   Substance and Sexual Activity   • Alcohol use:  Yes     Alcohol/week: 2 0 standard drinks     Types: 2 Cans of beer per week     Comment: social use as per [employer]   • Drug use: Never   • Sexual activity: None   Other Topics Concern   • None   Social History Narrative   • None     Social Determinants of Health     Financial Resource Strain: High Risk   • Difficulty of Paying Living Expenses: Hard   Food Insecurity: No Food Insecurity   • Worried About Running Out of Food in the Last Year: Never true   • Ran Out of Food in the Last Year: Never true   Transportation Needs: No Transportation Needs   • Lack of Transportation (Medical): No   • Lack of Transportation (Non-Medical):  No   Physical Activity: Not on file   Stress: Not on file   Social Connections: Not on file   Intimate Partner Violence: Not on file   Housing Stability: Low Risk    • Unable to Pay for Housing in the Last Year: No   • Number of Places Lived in the Last Year: 1   • Unstable Housing in the Last Year: No      Medications and Allergies:     Current Outpatient Medications   Medication Sig Dispense Refill   • acyclovir (ZOVIRAX) 400 MG tablet TAKE 1 TABLET TWICE A DAY 60 tablet 11   • albuterol (2 5 mg/3 mL) 0 083 % nebulizer solution Take 3 mL (2 5 mg total) by nebulization every 6 (six) hours as needed for wheezing or shortness of breath 180 mL 5   • albuterol (Ventolin HFA) 90 mcg/act inhaler Inhale 2 puffs every 6 (six) hours as needed for wheezing 54 g 1   • amLODIPine (NORVASC) 10 mg tablet Take 1 tablet (10 mg total) by mouth daily 90 tablet 1   • aspirin 81 MG tablet Take 81 mg by mouth daily Every other day     • citalopram (CeleXA) 40 mg tablet TAKE 1 TABLET DAILY 90 tablet 1   • fenofibrate (TRICOR) 145 mg tablet TAKE 1 TABLET DAILY 90 tablet 5   • folic acid (FOLVITE) 1 mg tablet Take 800 mcg by mouth daily      • gabapentin (NEURONTIN) 600 MG tablet TAKE 1 TABLET DAILY 90 tablet 3   • Ibrutinib 140 MG TABS Take 140 mg by mouth daily 30 tablet 6   • insulin degludec [place] Ding FlexTouch) 100 units/mL injection pen Inject 18 Units under the skin daily at bedtime 15 mL 1   • insulin lispro (HumaLOG KwikPen) 100 units/mL injection pen Inject 5-17 Units under the skin 3 (three) times a day with meals as needed for steroid induced hyperglycemia 15 mL 0   • LORazepam (ATIVAN) 0 5 mg tablet Take 1 tablet by mouth daily as needed for anxiety 30 tablet 0   • losartan (COZAAR) 100 MG tablet TAKE 1 TABLET DAILY 90 tablet 5   • mometasone-formoterol (Dulera) 200-5 MCG/ACT inhaler Inhale 2 puffs 2 (two) times a day Rinse mouth after use  (Patient taking differently: Inhale 2 puffs 2 (two) times a day as needed Rinse mouth after use ) 13 g 1   • multivitamin (THERAGRAN) TABS Take 1 tablet by mouth daily     • obinutuzumab (GAZYVA) 1000 mg/40 mL SOLN Infuse into a venous catheter     • oxyCODONE (ROXICODONE) 10 MG TABS Take 1 tablet (10 mg total) by mouth every 6 (six) hours as needed for moderate pain For ongoing pain control Max Daily Amount: 40 mg 28 tablet 0   • pantoprazole (PROTONIX) 40 mg tablet Take 1 tablet (40 mg total) by mouth 2 (two) times a day 180 tablet 3   • predniSONE 5 mg tablet TAKE 1 TABLET DAILY 90 tablet 5   • zolpidem (AMBIEN) 5 mg tablet Take 1 tablet (5 mg total) by mouth daily at bedtime as needed for sleep 30 tablet 0   • Continuous Blood Gluc  (FreeStyle Leija 2 [place]) JOE Use to scan sensor premeals and at bedtime 1 each 1   • Continuous Blood Gluc Sensor (FreeStyle Ashley 2 Sensor) MISC Change every 14 days 2 each 0   • glucose blood (FreeStyle Precision Sujit Test) test strip Use 1 each 3 (three) times a day 50 each 0   • glucose monitoring kit (FREESTYLE) monitoring kit 1 each by Does not apply route as needed ( ) E 11 9 1 each 0   • Insulin Pen Needle (BD Pen Needle Inez U/F) 32G X 4 MM MISC Use 3 (three) times a day 300 each 0   • Lancets (FREESTYLE) lancets Use as instructed--test 2 times a day    E 11 9 100 each 0   • naloxone (NARCAN) 4 mg/0 1 mL nasal spray Administer 1 spray into a nostril   If no response after 2-3 minutes, give another dose in the other nostril using a new spray  1 each 1   • sodium chloride, PF, 0 9 % 10 mL by Intracatheter route see administration instructions 10mL into port before and after ampicillin doses  0     No current facility-administered medications for this visit  Allergies   Allergen Reactions   • Heparin Other (See Comments)     Other reaction(s): Blood Disorders  clot   • Macrolides And Ketolides Other (See Comments)     Interacts with other meds he is taking   • Simvastatin Myalgia      Immunizations:     Immunization History   Administered Date(s) Administered   • INFLUENZA 12/05/2006, 11/02/2007, 10/29/2010, 03/20/2019   • Pneumococcal Conjugate 13-Valent 12/09/2019   • Pneumococcal Polysaccharide PPV23 03/15/2021      Health Maintenance:         Topic Date Due   • Lung Cancer Screening  05/06/2023   • Colorectal Cancer Screening  01/14/2026   • Hepatitis C Screening  Completed         Topic Date Due   • COVID-19 Vaccine (1) Never done   • Hepatitis A Vaccine (1 of 2 - Risk 2-dose series) Never done   • Hepatitis B Vaccine (1 of 3 - Risk 3-dose series) Never done   • Influenza Vaccine (1) 09/01/2022      Medicare Screening Tests and Risk Assessments:     Marcie Berumen is here for his Subsequent Wellness visit  Last Medicare Wellness visit information reviewed, patient interviewed, no change since last AWV  Health Risk Assessment:   Patient rates overall health as fair  Patient feels that their physical health rating is slightly worse  Patient is satisfied with their life  Eyesight was rated as same  Hearing was rated as same  Patient feels that their emotional and mental health rating is same  Patients states they are never, rarely angry  Patient states they are always unusually tired/fatigued  Pain experienced in the last 7 days has been some  Patient's pain rating has been 4/10  Patient states that he has experienced no weight loss or gain in last 6 months  Depression Screening:   PHQ-9 Score: 9      Fall Risk Screening:    In the past year, patient has experienced: no history of falling in past year      Home Safety:  Patient does not have trouble with stairs inside or outside of their home  Patient has working smoke alarms and has no working carbon monoxide detector  Home safety hazards include: none  Nutrition:   Current diet is Diabetic  Medications:   Patient is currently taking over-the-counter supplements  OTC medications include: see medication list  Patient is able to manage medications  Activities of Daily Living (ADLs)/Instrumental Activities of Daily Living (IADLs):   Walk and transfer into and out of bed and chair?: Yes  Dress and groom yourself?: Yes    Bathe or shower yourself?: Yes    Feed yourself? Yes  Do your laundry/housekeeping?: Yes  Manage your money, pay your bills and track your expenses?: Yes  Make your own meals?: Yes    Do your own shopping?: Yes    Previous Hospitalizations:   Any hospitalizations or ED visits within the last 12 months?: No      Advance Care Planning:   Living will: Yes    Durable POA for healthcare:  Yes    Advanced directive: Yes    Advanced directive counseling given: Yes    Five wishes given: Yes    Patient declined ACP directive: No    End of Life Decisions reviewed with patient: Yes    Provider agrees with end of life decisions: Yes      Cognitive Screening:   Provider or family/friend/caregiver concerned regarding cognition?: No    PREVENTIVE SCREENINGS      Cardiovascular Screening:    General: Screening Not Indicated, History Lipid Disorder, Risks and Benefits Discussed and Screening Current      Diabetes Screening:     General: Screening Not Indicated, History Diabetes, Risks and Benefits Discussed and Screening Current      Colorectal Cancer Screening:     General: Screening Current      Prostate Cancer Screening:    General: Risks and Benefits Discussed    Due for: PSA      Osteoporosis Screening:    General: Risks and Benefits Discussed and Screening Not Indicated      Abdominal Aortic Aneurysm (AAA) Screening:    Risk factors include: age between 73-67 yo and tobacco use        General: Risks and Benefits Discussed and Screening Not Indicated      Lung Cancer Screening:     General: Screening Current and Risks and Benefits Discussed      Hepatitis C Screening:    General: Screening Current and Risks and Benefits Discussed    Screening, Brief Intervention, and Referral to Treatment (SBIRT)    Screening  Typical number of drinks in a day: 2  Typical number of drinks in a week: 10  Interpretation: Low risk drinking behavior  AUDIT-C Screenin) How often did you have a drink containing alcohol in the past year? 4 or more times a week  2) How many drinks did you have on a typical day when you were drinking in the past year? 1 to 2  3) How often did you have 6 or more drinks on one occasion in the past year? never    AUDIT-C Score: 4  Interpretation: Score 4-12 (male): POSITIVE screen for alcohol misuse    AUDIT Screenin) How often during the last year have you found that you were not able to stop drinking once you had started? 0 - never  5) How often during the last year have you failed to do what was normally expected from you because of drinking? 0 - never  6) How often during the last year have you needed a first drink in the morning to get yourself going after a heavy drinking session?  0 - never  7) How often during the last year have you had a feeling of guilt or remorse after drinking? 0 - never  8) How often during the last year have you been unable to remember what happened the night before because you had been drinking? 0 - never  9) Have you or someone else been injured as a result of your drinking? 0 - no  10) Has a relative or friend or a doctor or another health worker been concerned about your drinking or suggested you cut down? 0 - no    AUDIT Score: 4  Interpretation: Low risk alcohol consumption    Single Item Drug Screening:  How often have you used an illegal drug "(including marijuana) or a prescription medication for non-medical reasons in the past year? never    Single Item Drug Screen Score: 0  Interpretation: Negative screen for possible drug use disorder    Review of Current Opioid Use    Opioid Risk Tool (ORT) Interpretation: Complete Opioid Risk Tool (ORT)    No results found  Physical Exam:     /74 (BP Location: Right arm, Patient Position: Sitting, Cuff Size: Large)   Pulse 94   Temp 98 2 °F (36 8 °C)   Ht 6' 1 5\" (1 867 m)   Wt 107 kg (236 lb 3 2 oz)   SpO2 96%   BMI 30 74 kg/m²     Physical Exam  Vitals reviewed  Constitutional:       General: He is not in acute distress  Appearance: He is well-developed  He is not diaphoretic  HENT:      Head: Normocephalic and atraumatic  No right periorbital erythema or left periorbital erythema  Right Ear: Tympanic membrane normal  No decreased hearing noted  Left Ear: Tympanic membrane normal  No decreased hearing noted  Nose: Nose normal       Right Sinus: No maxillary sinus tenderness or frontal sinus tenderness  Left Sinus: No maxillary sinus tenderness or frontal sinus tenderness  Mouth/Throat:      Lips: Pink  Mouth: Mucous membranes are moist       Pharynx: No oropharyngeal exudate  Tonsils: No tonsillar exudate or tonsillar abscesses  Eyes:      General: Lids are normal       Extraocular Movements: Extraocular movements intact  Conjunctiva/sclera: Conjunctivae normal       Pupils: Pupils are equal, round, and reactive to light  Neck:      Thyroid: No thyroid mass  Trachea: Trachea normal    Cardiovascular:      Rate and Rhythm: Normal rate and regular rhythm  Pulses: Normal pulses  Heart sounds: Normal heart sounds  No murmur heard  No friction rub  No gallop  Pulmonary:      Effort: Pulmonary effort is normal  No respiratory distress  Breath sounds: Normal breath sounds  No wheezing or rales     Abdominal:      General: Bowel " sounds are normal  There is no distension  Palpations: Abdomen is soft  There is no mass  Tenderness: There is no abdominal tenderness  There is no guarding  Musculoskeletal:         General: Normal range of motion  Cervical back: Normal range of motion and neck supple  Skin:     General: Skin is warm and dry  Coloration: Skin is not pale  Findings: No erythema or rash  Neurological:      Mental Status: He is alert and oriented to person, place, and time  Cranial Nerves: No cranial nerve deficit  Coordination: Coordination normal    Psychiatric:         Attention and Perception: Attention and perception normal          Mood and Affect: Mood and affect normal          Speech: Speech normal          Behavior: Behavior normal          Thought Content:  Thought content normal          Cognition and Memory: Cognition and memory normal          Judgment: Judgment normal           Ihab Abdelaal, DO

## 2023-03-27 NOTE — PROGRESS NOTES
Assessment/Plan:   1  Type 2 diabetes mellitus with diabetic polyneuropathy, with long-term current use of insulin [facility])  Patient following with endocrinology  A1c appears overall stable at 7 0  Continue with his regular follow-up with endocrinology as well as his current treatment with his Svetlana Hash as well as Humalog  2  CLL (chronic lymphocytic leukemia) [facility])  Patient following with hematology/oncology regularly  He currently has been tolerating his Gazyva, ibrutinib, IVIG well  3  Mild episode of recurrent major depressive disorder (HCC)  Mood appears stable today  At this time, continue with his current treatment of citalopram     4  Primary hypertension  Pressure appears very well controlled today  Continue with his current treatment of amlodipine  5  Mixed hyperlipidemia  Recheck lipid panel  Continue with a strict low-fat/low-cholesterol as well as his current treatment with fenofibrate  6  Chronic obstructive pulmonary disease, unspecified COPD type ([facility] [address] )  Stable  Patient denies any recent shortness of breath  He does monitor his oxygen saturations at home  His O2 sat has been approximately 95 to 96%  Continue with his current treatment of Chepe Drivers as well as his albuterol  7  Gastroesophageal reflux disease with esophagitis, unspecified whether hemorrhage  Stable  Continue with dietary trigger avoidance as well as current treatment with pantoprazole  8  Chronic rhinitis  Patient's cough today appears likely secondary to chronic rhinitis/postnasal drip  We will start treatment with fluticasone nasal spray  Follow-up follow-up in 6 months  - benzonatate (TESSALON) 200 MG capsule; Take 1 capsule (200 mg total) by mouth 3 (three) times a day as needed for cough  Dispense: 20 capsule;  Refill: 0  - fluticasone (FLONASE) 50 mcg/act nasal spray; 2 sprays into each nostril daily  Dispense: 16 g; Refill: 0               Diagnoses and all orders for this visit:    Medicare annual wellness visit, subsequent    Type 2 diabetes mellitus with diabetic polyneuropathy, with long-term current use of insulin (HCC)    CLL (chronic lymphocytic leukemia) (HCC)    Mild episode of recurrent major depressive disorder (HCC)    Primary hypertension    Mixed hyperlipidemia    Chronic obstructive pulmonary disease, unspecified COPD type (HCC)    Gastroesophageal reflux disease with esophagitis, unspecified whether hemorrhage    Chronic kidney disease, unspecified CKD stage    Chronic rhinitis  -     benzonatate (TESSALON) 200 MG capsule; Take 1 capsule (200 mg total) by mouth 3 (three) times a day as needed for cough  -     fluticasone (FLONASE) 50 mcg/act nasal spray; 2 sprays into each nostril daily    Continuous opioid dependence (HCC)    Stage 3 chronic kidney disease, unspecified whether stage 3a or 3b CKD ([facility])          Subjective:       Chief Complaint   Patient presents with   • Medicare Wellness Visit     AWV, requests refill for Benzonatate      Patient ID: Chelsea Lemus is a 75 y o  male presents today for a follow-up on extremities  He has type 2 diabetes, CLL, mild major depression, hypertension, dyslipidemia, COPD, GERD, CKD 3  He has been taking his medications regularly  He denies adverse effects with his medications  He has been following up with hematology/oncology as well as endocrinology regularly  He also sees cardiology as well  He does have blood work completed monthly from the infusion center  HPI    Review of Systems   Constitutional: Negative for activity change, chills, fatigue and fever  HENT: Negative for congestion, ear pain, sinus pressure and sore throat  Eyes: Negative for redness, itching and visual disturbance  Respiratory: Negative for cough and shortness of breath  Cardiovascular: Negative for chest pain and palpitations  Gastrointestinal: Negative for abdominal pain, diarrhea and nausea  Endocrine: Negative for cold intolerance and heat intolerance  Genitourinary: Negative for dysuria, flank pain and frequency  Musculoskeletal: Negative for arthralgias, back pain, gait problem and myalgias  Skin: Negative for color change  Allergic/Immunologic: Negative for environmental allergies  Neurological: Negative for dizziness, numbness and headaches  Psychiatric/Behavioral: Negative for behavioral problems and sleep disturbance  The following portions of the patient's history were reviewed and updated as appropriate : past family history, past medical history, past social history and past surgical history      Current Outpatient Medications:   •  acyclovir (ZOVIRAX) 400 MG tablet, TAKE 1 TABLET TWICE A DAY, Disp: 60 tablet, Rfl: 11  •  albuterol (2 5 mg/3 mL) 0 083 % nebulizer solution, Take 3 mL (2 5 mg total) by nebulization every 6 (six) hours as needed for wheezing or shortness of breath, Disp: 180 mL, Rfl: 5  •  albuterol (Ventolin HFA) 90 mcg/act inhaler, Inhale 2 puffs every 6 (six) hours as needed for wheezing, Disp: 54 g, Rfl: 1  •  amLODIPine (NORVASC) 10 mg tablet, Take 1 tablet (10 mg total) by mouth daily, Disp: 90 tablet, Rfl: 1  •  aspirin 81 MG tablet, Take 81 mg by mouth daily Every other day, Disp: , Rfl:   •  benzonatate (TESSALON) 200 MG capsule, Take 1 capsule (200 mg total) by mouth 3 (three) times a day as needed for cough, Disp: 20 capsule, Rfl: 0  •  citalopram (CeleXA) 40 mg tablet, TAKE 1 TABLET DAILY, Disp: 90 tablet, Rfl: 1  •  fenofibrate (TRICOR) 145 mg tablet, TAKE 1 TABLET DAILY, Disp: 90 tablet, Rfl: 5  •  fluticasone (FLONASE) 50 mcg/act nasal spray, 2 sprays into each nostril daily, Disp: 16 g, Rfl: 0  •  folic acid (FOLVITE) 1 mg tablet, Take 800 mcg by mouth daily , Disp: , Rfl:   •  gabapentin (NEURONTIN) 600 MG tablet, TAKE 1 TABLET DAILY, Disp: 90 tablet, Rfl: 3  •  Ibrutinib 140 MG TABS, Take 140 mg by mouth daily, Disp: 30 tablet, Rfl: 6  •  insulin degludec Ana Actis FlexTouch) 100 units/mL injection pen, Inject 18 Units under the skin daily at bedtime, Disp: 15 mL, Rfl: 1  •  insulin lispro (HumaLOG KwikPen) 100 units/mL injection pen, Inject 5-17 Units under the skin 3 (three) times a day with meals as needed for steroid induced hyperglycemia, Disp: 15 mL, Rfl: 0  •  LORazepam (ATIVAN) 0 5 mg tablet, Take 1 tablet by mouth daily as needed for anxiety, Disp: 30 tablet, Rfl: 0  •  losartan (COZAAR) 100 MG tablet, TAKE 1 TABLET DAILY, Disp: 90 tablet, Rfl: 5  •  mometasone-formoterol (Dulera) 200-5 MCG/ACT inhaler, Inhale 2 puffs 2 (two) times a day Rinse mouth after use   (Patient taking differently: Inhale 2 puffs 2 (two) times a day as needed Rinse mouth after use ), Disp: 13 g, Rfl: 1  •  multivitamin (THERAGRAN) TABS, Take 1 tablet by mouth daily, Disp: , Rfl:   •  obinutuzumab (GAZYVA) 1000 mg/40 mL SOLN, Infuse into a venous catheter, Disp: , Rfl:   •  oxyCODONE (ROXICODONE) 10 MG TABS, Take 1 tablet (10 mg total) by mouth every 6 (six) hours as needed for moderate pain For ongoing pain control Max Daily Amount: 40 mg, Disp: 28 tablet, Rfl: 0  •  pantoprazole (PROTONIX) 40 mg tablet, Take 1 tablet (40 mg total) by mouth 2 (two) times a day, Disp: 180 tablet, Rfl: 3  •  predniSONE 5 mg tablet, TAKE 1 TABLET DAILY, Disp: 90 tablet, Rfl: 5  •  zolpidem (AMBIEN) 5 mg tablet, Take 1 tablet (5 mg total) by mouth daily at bedtime as needed for sleep, Disp: 30 tablet, Rfl: 0  •  Continuous Blood Gluc  (FreeStyle Ashley 2 Lewis) JOE, Use to scan sensor premeals and at bedtime, Disp: 1 each, Rfl: 1  •  Continuous Blood Gluc Sensor (FreeStyle Ashley 2 Sensor) MISC, Change every 14 days, Disp: 2 each, Rfl: 0  •  glucose blood (FreeStyle Precision Sujit Test) test strip, Use 1 each 3 (three) times a day, Disp: 50 each, Rfl: 0  •  glucose monitoring kit (FREESTYLE) monitoring kit, 1 each by Does not apply route as needed ( ) E 11 9, Disp: 1 each, Rfl: 0  •  Insulin Pen Needle (BD Pen Needle Inez U/F) 32G X 4 MM MISC, "Use 3 (three) times a day, Disp: 300 each, Rfl: 0  •  Lancets (FREESTYLE) lancets, Use as instructed--test 2 times a day  E 11 9, Disp: 100 each, Rfl: 0  •  naloxone (NARCAN) 4 mg/0 1 mL nasal spray, Administer 1 spray into a nostril  If no response after 2-3 minutes, give another dose in the other nostril using a new spray , Disp: 1 each, Rfl: 1  •  sodium chloride, PF, 0 9 %, 10 mL by Intracatheter route see administration instructions 10mL into port before and after ampicillin doses, Disp: , Rfl: 0         Objective:         Vitals:    03/27/23 0833   BP: 120/74   BP Location: Right arm   Patient Position: Sitting   Cuff Size: Large   Pulse: 94   Temp: 98 2 °F (36 8 °C)   SpO2: 96%   Weight: 107 kg (236 lb 3 2 oz)   Height: 6' 1 5\" (1 867 m)     Physical Exam  Vitals reviewed  Constitutional:       Appearance: He is well-developed  HENT:      Head: Normocephalic and atraumatic  Nose: Nose normal       Mouth/Throat:      Pharynx: No oropharyngeal exudate  Eyes:      General: No scleral icterus  Right eye: No discharge  Left eye: No discharge  Pupils: Pupils are equal, round, and reactive to light  Neck:      Trachea: No tracheal deviation  Cardiovascular:      Rate and Rhythm: Normal rate and regular rhythm  Pulses:           Dorsalis pedis pulses are 2+ on the right side and 2+ on the left side  Posterior tibial pulses are 2+ on the right side and 2+ on the left side  Heart sounds: Normal heart sounds  No murmur heard  No friction rub  No gallop  Pulmonary:      Effort: Pulmonary effort is normal  No respiratory distress  Breath sounds: Normal breath sounds  No wheezing or rales  Abdominal:      General: Bowel sounds are normal  There is no distension  Palpations: Abdomen is soft  Tenderness: There is no abdominal tenderness  There is no guarding or rebound  Musculoskeletal:         General: Normal range of motion        Cervical back: " Normal range of motion and neck supple  Lymphadenopathy:      Head:      Right side of head: No submental or submandibular adenopathy  Left side of head: No submental or submandibular adenopathy  Cervical: No cervical adenopathy  Right cervical: No superficial, deep or posterior cervical adenopathy  Left cervical: No superficial, deep or posterior cervical adenopathy  Skin:     General: Skin is warm and dry  Findings: No erythema  Neurological:      Mental Status: He is alert and oriented to person, place, and time  Cranial Nerves: No cranial nerve deficit  Sensory: No sensory deficit  Psychiatric:         Mood and Affect: Mood is not anxious or depressed  Speech: Speech normal          Behavior: Behavior normal          Thought Content:  Thought content normal          Judgment: Judgment normal

## 2023-03-27 NOTE — PATIENT INSTRUCTIONS
Medicare Preventive Visit Patient Instructions  Thank you for completing your Welcome to Medicare Visit or Medicare Annual Wellness Visit today  Your next wellness visit will be due in one year (3/27/2024)  The screening/preventive services that you may require over the next 5-10 years are detailed below  Some tests may not apply to you based off risk factors and/or age  Screening tests ordered at today's visit but not completed yet may show as past due  Also, please note that scanned in results may not display below  Preventive Screenings:  Service Recommendations Previous Testing/Comments   Colorectal Cancer Screening  · Colonoscopy    · Fecal Occult Blood Test (FOBT)/Fecal Immunochemical Test (FIT)  · Fecal DNA/Cologuard Test  · Flexible Sigmoidoscopy Age: 39-70 years old   Colonoscopy: every 10 years (May be performed more frequently if at higher risk)  OR  FOBT/FIT: every 1 year  OR  Cologuard: every 3 years  OR  Sigmoidoscopy: every 5 years  Screening may be recommended earlier than age 39 if at higher risk for colorectal cancer  Also, an individualized decision between you and your healthcare provider will decide whether screening between the ages of 74-80 would be appropriate   Colonoscopy: 01/14/2016  FOBT/FIT: Not on file  Cologuard: Not on file  Sigmoidoscopy: Not on file    Screening Current     Prostate Cancer Screening Individualized decision between patient and health care provider in men between ages of 53-78   Medicare will cover every 12 months beginning on the day after your 50th birthday PSA: No results in last 5 years           Hepatitis C Screening Once for adults born between 1945 and 1965  More frequently in patients at high risk for Hepatitis C Hep C Antibody: 03/21/2017    Screening Current   Diabetes Screening 1-2 times per year if you're at risk for diabetes or have pre-diabetes Fasting glucose: 87 mg/dL (3/21/2023)  A1C: 7 0 % (1/10/2023)  Screening Not Indicated  History Diabetes Cholesterol Screening Once every 5 years if you don't have a lipid disorder  May order more often based on risk factors  Lipid panel: 01/10/2023  Screening Not Indicated  History Lipid Disorder      Other Preventive Screenings Covered by Medicare:  1  Abdominal Aortic Aneurysm (AAA) Screening: covered once if your at risk  You're considered to be at risk if you have a family history of AAA or a male between the age of 73-68 who smoking at least 100 cigarettes in your lifetime  2  Lung Cancer Screening: covers low dose CT scan once per year if you meet all of the following conditions: (1) Age 50-69; (2) No signs or symptoms of lung cancer; (3) Current smoker or have quit smoking within the last 15 years; (4) You have a tobacco smoking history of at least 20 pack years (packs per day x number of years you smoked); (5) You get a written order from a healthcare provider  3  Glaucoma Screening: covered annually if you're considered high risk: (1) You have diabetes OR (2) Family history of glaucoma OR (3)  aged 48 and older OR (3)  American aged 72 and older  3  Osteoporosis Screening: covered every 2 years if you meet one of the following conditions: (1) Have a vertebral abnormality; (2) On glucocorticoid therapy for more than 3 months; (3) Have primary hyperparathyroidism; (4) On osteoporosis medications and need to assess response to drug therapy  5  HIV Screening: covered annually if you're between the age of 12-76  Also covered annually if you are younger than 13 and older than 72 with risk factors for HIV infection  For pregnant patients, it is covered up to 3 times per pregnancy      Immunizations:  Immunization Recommendations   Influenza Vaccine Annual influenza vaccination during flu season is recommended for all persons aged >= 6 months who do not have contraindications   Pneumococcal Vaccine   * Pneumococcal conjugate vaccine = PCV13 (Prevnar 13), PCV15 (Vaxneuvance), PCV20 (Prevnar 20)  * Pneumococcal polysaccharide vaccine = PPSV23 (Pneumovax) Adults 2364 years old: 1-3 doses may be recommended based on certain risk factors  Adults 72 years old: 1-2 doses may be recommended based off what pneumonia vaccine you previously received   Hepatitis B Vaccine 3 dose series if at intermediate or high risk (ex: diabetes, end stage renal disease, liver disease)   Tetanus (Td) Vaccine - COST NOT COVERED BY MEDICARE PART B Following completion of primary series, a booster dose should be given every 10 years to maintain immunity against tetanus  Td may also be given as tetanus wound prophylaxis  Tdap Vaccine - COST NOT COVERED BY MEDICARE PART B Recommended at least once for all adults  For pregnant patients, recommended with each pregnancy  Shingles Vaccine (Shingrix) - COST NOT COVERED BY MEDICARE PART B  2 shot series recommended in those aged 48 and above     Health Maintenance Due:      Topic Date Due   • Lung Cancer Screening  05/06/2023   • Colorectal Cancer Screening  01/14/2026   • Hepatitis C Screening  Completed     Immunizations Due:      Topic Date Due   • COVID-19 Vaccine (1) Never done   • Hepatitis A Vaccine (1 of 2 - Risk 2-dose series) Never done   • Hepatitis B Vaccine (1 of 3 - Risk 3-dose series) Never done   • Influenza Vaccine (1) 09/01/2022     Advance Directives   What are advance directives? Advance directives are legal documents that state your wishes and plans for medical care  These plans are made ahead of time in case you lose your ability to make decisions for yourself  Advance directives can apply to any medical decision, such as the treatments you want, and if you want to donate organs  What are the types of advance directives? There are many types of advance directives, and each state has rules about how to use them  You may choose a combination of any of the following:  · Living will: This is a written record of the treatment you want   You can also choose which treatments you do not want, which to limit, and which to stop at a certain time  This includes surgery, medicine, IV fluid, and tube feedings  · Durable power of  for healthcare [facility]): This is a written record that states who you want to make healthcare choices for you when you are unable to make them for yourself  This person, called a proxy, is usually a family member or a friend  You may choose more than 1 proxy  · Do not resuscitate (DNR) order:  A DNR order is used in case your heart stops beating or you stop breathing  It is a request not to have certain forms of treatment, such as CPR  A DNR order may be included in other types of advance directives  · Medical directive: This covers the care that you want if you are in a coma, near death, or unable to make decisions for yourself  You can list the treatments you want for each condition  Treatment may include pain medicine, surgery, blood transfusions, dialysis, IV or tube feedings, and a ventilator (breathing machine)  · Values history: This document has questions about your views, beliefs, and how you feel and think about life  This information can help others choose the care that you would choose  Why are advance directives important? An advance directive helps you control your care  Although spoken wishes may be used, it is better to have your wishes written down  Spoken wishes can be misunderstood, or not followed  Treatments may be given even if you do not want them  An advance directive may make it easier for your family to make difficult choices about your care  Weight Management   Why it is important to manage your weight:  Being overweight increases your risk of health conditions such as heart disease, high blood pressure, type 2 diabetes, and certain types of cancer  It can also increase your risk for osteoarthritis, sleep apnea, and other respiratory problems  Aim for a slow, steady weight loss   Even a small amount of weight loss can lower your risk of health problems  How to lose weight safely:  A safe and healthy way to lose weight is to eat fewer calories and get regular exercise  You can lose up about 1 pound a week by decreasing the number of calories you eat by 500 calories each day  Healthy meal plan for weight management:  A healthy meal plan includes a variety of foods, contains fewer calories, and helps you stay healthy  A healthy meal plan includes the following:  · Eat whole-grain foods more often  A healthy meal plan should contain fiber  Fiber is the part of grains, fruits, and vegetables that is not broken down by your body  Whole-grain foods are healthy and provide extra fiber in your diet  Some examples of whole-grain foods are whole-wheat breads and pastas, oatmeal, brown rice, and bulgur  · Eat a variety of vegetables every day  Include dark, leafy greens such as spinach, kale, eleanor greens, and mustard greens  Eat yellow and orange vegetables such as carrots, sweet potatoes, and winter squash  · Eat a variety of fruits every day  Choose fresh or canned fruit (canned in its own juice or light syrup) instead of juice  Fruit juice has very little or no fiber  · Eat low-fat dairy foods  Drink fat-free (skim) milk or 1% milk  Eat fat-free yogurt and low-fat cottage cheese  Try low-fat cheeses such as mozzarella and other reduced-fat cheeses  · Choose meat and other protein foods that are low in fat  Choose beans or other legumes such as split peas or lentils  Choose fish, skinless poultry (chicken or turkey), or lean cuts of red meat (beef or pork)  Before you cook meat or poultry, cut off any visible fat  · Use less fat and oil  Try baking foods instead of frying them  Add less fat, such as margarine, sour cream, regular salad dressing and mayonnaise to foods  Eat fewer high-fat foods  Some examples of high-fat foods include french fries, doughnuts, ice cream, and cakes  · Eat fewer sweets    Limit foods and drinks that are high in sugar  This includes candy, cookies, regular soda, and sweetened drinks  Exercise:  Exercise at least 30 minutes per day on most days of the week  Some examples of exercise include walking, biking, dancing, and swimming  You can also fit in more physical activity by taking the stairs instead of the elevator or parking farther away from stores  Ask your healthcare provider about the best exercise plan for you  Narcotic (Opioid) Safety    Use narcotics safely:  · Take prescribed narcotics exactly as directed  · Do not give narcotics to others or take narcotics that belong to someone else  · Do not mix narcotics without medicines or alcohol  · Do not drive or operate heavy machinery after you take the narcotic  · Monitor for side effects and notify your healthcare provider if you experienced side effects such as nausea, sleepiness, itching, or trouble thinking clearly  Manage constipation:    Constipation is the most common side effect of narcotic medicine  Constipation is when you have hard, dry bowel movements, or you go longer than usual between bowel movements  Tell your healthcare provider about all changes in your bowel movements while you are taking narcotics  He or she may recommend laxative medicine to help you have a bowel movement  He or she may also change the kind of narcotic you are taking, or change when you take it  The following are more ways you can prevent or relieve constipation:    · Drink liquids as directed  You may need to drink extra liquids to help soften and move your bowels  Ask how much liquid to drink each day and which liquids are best for you  · Eat high-fiber foods  This may help decrease constipation by adding bulk to your bowel movements  High-fiber foods include fruits, vegetables, whole-grain breads and cereals, and beans  Your healthcare provider or dietitian can help you create a high-fiber meal plan   Your provider may also recommend a fiber supplement if you cannot get enough fiber from food  · Exercise regularly  Regular physical activity can help stimulate your intestines  Walking is a good exercise to prevent or relieve constipation  Ask which exercises are best for you  · Schedule a time each day to have a bowel movement  This may help train your body to have regular bowel movements  Bend forward while you are on the toilet to help move the bowel movement out  Sit on the toilet for at least 10 minutes, even if you do not have a bowel movement  Store narcotics safely:   · Store narcotics where others cannot easily get them  Keep them in a locked cabinet or secure area  Do not  keep them in a purse or other bag you carry with you  A person may be looking for something else and find the narcotics  · Make sure narcotics are stored out of the reach of children  A child can easily overdose on narcotics  Narcotics may look like candy to a small child  The best way to dispose of narcotics: The laws vary by country and area  In the [place], the best way is to return the narcotics through a take-back program  This program is offered by the [employer])  The following are options for using the program:  · Take the narcotics to a TOMMY collection site  The site is often a law enforcement center  Call your local law enforcement center for scheduled take-back days in your area  You will be given information on where to go if the collection site is in a different location  · Take the narcotics to an approved pharmacy or hospital   A pharmacy or hospital may be set up as a collection site  You will need to ask if it is a TOMMY collection site if you were not directed there  A pharmacy or doctor's office may not be able to take back narcotics unless it is a TOMMY site  · Use a mail-back system  This means you are given containers to put the narcotics into  You will then mail them in the containers    · Use a take-back drop box  This is a place to leave the narcotics at any time  People and animals will not be able to get into the box  Your local law enforcement agency can tell you where to find a drop box in your area  Other ways to manage pain:   · Ask your healthcare provider about non-narcotic medicines to control pain  Nonprescription medicines include NSAIDs (such as ibuprofen) and acetaminophen  Prescription medicines include muscle relaxers, antidepressants, and steroids  · Pain may be managed without any medicines  Some ways to relieve pain include massage, aromatherapy, or meditation  Physical or occupational therapy may also help  For more information:   · Drug Enforcement Administration  [address] [facility] [address] [address]  Phone: 4- 219 - 632-9063  Web Address: [facility]/drug_disposal/    · [address]  and Drug Administration  [place]a Danii  [place] , [address] [facility]  Phone: 8- 164 - 566-7229  Web Address: http://[employer]/     © Copyright [address] 2018 Information is for End User's use only and may not be sold, redistributed or otherwise used for commercial purposes  All illustrations and images included in CareNotes® are the copyrighted property of A D A M , Inc  or [address] [place], [place] [address]   Type 2 Diabetes in Adults: New Diagnosis   AMBULATORY CARE:   Type 2 diabetes  is a disease that affects how your body uses glucose (sugar)  Normally, when the blood sugar level increases, the pancreas makes more insulin  Insulin helps move sugar out of the blood so it can be used for energy  Type 2 diabetes develops because either the body cannot make enough insulin, or it cannot use the insulin correctly  Type 2 diabetes can be controlled to prevent damage to your heart, blood vessels, and other organs         Common symptoms include the following:   • Numbness in your fingers or toes    • Blurred vision    • Urinating often    • More hunger or thirst than usual    Have someone call your local emergency number (911 in the [address] [facility]) if:   • You have any of the following signs of a stroke:      ? Numbness or drooping on one side of your face     ? Weakness in an arm or leg    ? Confusion or difficulty speaking    ? Dizziness, a severe headache, or vision loss    • You have any of the following signs of a heart attack:      ? Squeezing, pressure, or pain in your chest    ? You may  also have any of the following:     - Discomfort or pain in your back, neck, jaw, stomach, or arm    - Shortness of breath    - Nausea or vomiting    - Lightheadedness or a sudden cold sweat    • You have trouble breathing  Seek care immediately if:   • You have severe abdominal pain  • You vomit for more than 2 hours  • You have trouble staying awake or focusing  • You are shaking or sweating  • You feel weak or more tired than usual     • You have blurred or double vision  • Your breath has a fruity, sweet smell  Call your doctor or diabetes care team provider if:   • Your arms and legs are swollen  • You have an upset stomach and cannot eat the foods on your meal plan  • You feel dizzy, have headaches, or are easily irritated  • Your skin is red, warm, dry, or swollen  • You have a wound that does not heal     • You have numbness in your arms or legs  • You have trouble coping with your illness, or you feel anxious or depressed  • You have questions or concerns about your condition or care  Treatment for type 2 diabetes  helps prevent or delay complications, including heart and kidney disease  You must eat healthy meals and do regular physical activity  You may  need any of the following:  • Diabetes medicines or insulin  may be given to decrease the amount of sugar in your blood  • Blood pressure medicine  may be given to lower your blood pressure  • Cholesterol-lowering medicine  may be given to prevent heart disease      • Antiplatelets , such as aspirin, help prevent blood clots  Take your antiplatelet medicine exactly as directed  These medicines make it more likely for you to bleed or bruise  If you are told to take aspirin, do not take acetaminophen or ibuprofen instead  • Take your medicine as directed  Contact your healthcare provider if you think your medicine is not helping or if you have side effects  Tell your provider if you are allergic to any medicine  Keep a list of the medicines, vitamins, and herbs you take  Include the amounts, and when and why you take them  Bring the list or the pill bottles to follow-up visits  Carry your medicine list with you in case of an emergency  Tests  may be used to check for type 2 diabetes starting at age 28  Any of the following may be used to diagnose diabetes or check that it is well controlled:  • An A1c test  shows the average amount of sugar in your blood over the past 2 to 3 months  Your diabetes care team provider will tell you the A1c level that is right for you  • A fasting plasma glucose test  is when your blood sugar level is tested after you have not eaten for 8 hours  • A 2-hour plasma glucose test  starts with a blood sugar level check after you have not eaten for 8 hours  You are then given a glucose drink  Your blood sugar level is checked after 2 hours  • A random glucose test  may be done any time of day, no matter how long ago you ate  Diabetes education:  Diabetes education will start right away  Diabetes education may also happen later to refresh your memory  Your diabetes care team may include physicians, nurse practitioners, and physician assistants  It may also include nurses, dietitians, exercise specialists, pharmacists, dentists, and podiatrists  Family members, or others who are close to you, may also be part of the team  You and your team will make goals and plans to manage diabetes and other health problems  The plans and goals will be specific to your needs   Members of your diabetes care team will teach you the following:  • About nutrition:  A dietitian will help you make a meal plan to keep your blood sugar level steady  You will learn how food affects your blood sugar levels  You will also learn to keep track of sugar and starchy foods (carbohydrates)  Do not skip meals  Your blood sugar level may drop too low if you have taken diabetes medicine and do not eat  You may be taught to use the plate method for portion control  With the plate method, ½ of your plate contains vegetables  The other half is divided so that ¼ contains protein or meat, and ¼ contains starches, such as potatoes  • About physical activity and diabetes: You will learn why physical activity, such as walking, is important  You and your care team provider will make a plan for your activity  Your care team provider will tell you what a healthy weight is for you  He or she will help you make a plan to get to that weight and stay there  Maintain a healthy weight to help delay or prevent complications of diabetes  • About your blood sugar level: You will learn what your blood sugar level should be  You will be given information on when and how to check your blood sugar level  You may need to check by testing a drop of blood in a glucose monitor  You may instead be given a continuous glucose monitoring (CGM) device  A sensor is placed in your abdomen or on your arm  You put a transmitter on the sensor to get a reading that shows up on the monitor  You will learn what to do if your level is too high or too low  Write down the times of your checks and your levels  Take them to all follow-up appointments  • About diabetes medicine: You may need oral diabetes medicine, insulin, or both to help control your blood sugar levels  Your healthcare provider will teach you how and when to take oral diabetes medicine  You will also be taught about side effects oral diabetes medicine can cause  Insulin may be added if oral diabetes medicine becomes less effective over time  Insulin may be injected, or given through a pump or pen  You and your care team will discuss which method is best for you  ? An insulin pump  is an implanted device that gives your insulin 24 hours a day  An insulin pump prevents the need for multiple insulin injections in a day  ? An insulin pen  is a device prefilled with the right amount of insulin  ? You and your family members will be taught how to draw up and give insulin  if this is the best method for you  Your education team will also teach you how to dispose of needles and syringes  ? You will learn how much insulin you need  and when to give it  You will be taught when not to give insulin  You will also be taught what to do if your blood sugar level drops too low  This may happen if you take insulin and do not eat the right amount of carbohydrates  Other ways to help manage type 2 diabetes:   • Talk to your care team if you have increased stress about your diagnosis  Stress about your diagnosis can keep you from taking care of yourself properly  Your care team can help by offering tips about self-care  Your care team may suggest you talk to a mental health provider  The provider can listen and offer help with self-care issues  • Check your feet each day for sores  Wear shoes and socks that fit correctly  Do not trim your toenails  Go to a podiatrist  Ask your care team for more information about foot care  • Do not smoke  Nicotine and other chemicals in cigarettes and cigars can cause lung damage and make diabetes harder to manage  Ask your care team provider for information if you currently smoke and need help to quit  E-cigarettes or smokeless tobacco still contain nicotine  Talk to your care team provider before you use these products  • Drink water instead of sugary drinks such as soft drinks and fruit juices    Sugary drinks increase your blood sugar level and weight  Your healthcare provider may tell you that diet drinks do not help with weight loss  • Know the risks if you choose to drink alcohol  Alcohol can cause your blood sugar levels to be low if you use insulin  Alcohol can cause high blood sugar levels and weight gain if you drink too much  A drink of alcohol is 12 ounces of beer, 5 ounces of wine, or 1½ ounces of liquor  Your care team can tell you how many drinks are okay to have in 24 hours and in 1 week  • Check your blood pressure as directed  If you have high blood pressure (BP), talk to your care team about your BP goals  Together you can create a plan to lower your BP if needed and keep it in a healthy range  The plan may include lifestyle changes or medicines  A normal BP is 119/79 or lower  A normal blood pressure can help prevent or delay certain complications from diabetes  Examples include retinopathy (eye damage) and kidney damage  • Wear medical alert identification  Wear medical alert jewelry or carry a card that says you have diabetes  Ask your care team provider where to get these items  • Ask about vaccines you may need  You have a higher risk for serious illness if you get the flu, pneumonia, COVID-19, or hepatitis  Ask your provider if you should get vaccines to prevent these or other diseases, and when to get the vaccines  Risks of type 2 diabetes: It is important to learn to keep your diabetes in control  Uncontrolled diabetes can damage your nerves, veins, and arteries  Your risk for dementia increases faster the longer your diabetes is not controlled  High blood sugar levels may damage other body tissues and organs over time  Damage to arteries may increase your risk for heart attack and stroke  Nerve damage may also lead to other heart, stomach, and nerve problems  Diabetes can become life-threatening if it is not controlled    Follow up with your care team providers as directed: You may need to return to have your A1c checked every 3 months  You will need to have your feet checked during at least 1 visit each year  You will need an eye exam 1 time each year to check for retinopathy  You will also need tests to check for kidney or heart disease, and high blood pressure  Write down your questions so you remember to ask them during your visits  © Copyright Amada Chauncey 2022 Information is for End User's use only and may not be sold, redistributed or otherwise used for commercial purposes  The above information is an  only  It is not intended as medical advice for individual conditions or treatments  Talk to your doctor, nurse or pharmacist before following any medical regimen to see if it is safe and effective for you  Basic Carbohydrate Counting   AMBULATORY CARE:   Carbohydrate counting  is a way to plan your meals by counting the amount of carbohydrate in foods  Carbohydrates are the sugars, starches, and fiber found in fruit, grains, vegetables, and milk products  Carbohydrates increase your blood sugar levels  Carbohydrate counting can help you eat the right amount of carbohydrate to keep your blood sugar levels under control  What you need to know about planning meals using carbohydrate counting:  • A dietitian or healthcare provider will help you develop a healthy meal plan that works best for you  You will be taught how much carbohydrate to eat or drink for each meal and snack  Your meal plan will be based on your age, weight, usual food intake, and physical activity level  If you have diabetes, it will also include your blood sugar levels and diabetes medicine  Once you know how much carbohydrate you should eat, you can decide what type of food you want to eat  • You will need to know what foods contain carbohydrate and how much they contain  Keep track of the amount of carbohydrate in meals and snacks in order to follow your meal plan   Do not avoid carbohydrates or skip meals  Your blood sugar may fall too low if you do not eat enough carbohydrate or you skip meals  Foods that contain carbohydrate:   • Breads:  Each serving of food listed below contains about 15 g of carbohydrate   ? 1 slice of bread (1 ounce) or 1 flour or corn tortilla (6 inch)    ? ½ of a hamburger bun or ¼ of a large bagel (about 1 ounce)    ? 1 pancake (about 4 inches across and ¼ inch thick)    • Cereals and grains:  Serving sizes of ready-to-eat cereals vary  Look at the serving size and the total carbohydrate amount listed on the food label  Each serving of food listed below contains about 15 g of carbohydrate   ? ¾ cup of dry, unsweetened, ready-to-eat cereal or ¼ cup of low-fat granola     ? ½ cup of oatmeal or other cooked cereal     ? ? cup of cooked rice or pasta    • Starchy vegetables and beans:  Each serving of food listed below contains about 15 g of carbohydrate   ? ½ cup of corn, green peas, sweet potatoes, or mashed potatoes    ? ¼ of a large baked potato    ? ½ cup of beans, lentils, and peas (garbanzo, murphy, kidney, white, split, black-eyed)    • Crackers and snacks:  Each serving of food listed below contains about 15 g of carbohydrate   ? 3 cosmo cracker squares or 8 animal crackers     ? 6 saltine-type crackers    ? 3 cups of popcorn or ¾ ounce of pretzels, potato chips, or tortilla chips    • Fruit:  Each serving of food listed below contains about 15 g of carbohydrate   ? 1 small (4 ounce) piece of fresh fruit or ¾ to 1 cup of fresh fruit    ? ½ cup of canned or frozen fruit, packed in natural juice    ? ½ cup (4 ounces) of unsweetened fruit juice    ? 2 tablespoons of dried fruit    • Desserts or sugary foods:  Each serving of food listed below contains about 15 g of carbohydrate   ? 2-inch square unfrosted cake or brownie     ? 2 small cookies    ? ½ cup of ice cream, frozen yogurt, or nondairy frozen yogurt    ?  ¼ cup of sherbet or sorbet    ? 1 tablespoon of regular syrup, jam, or jelly    ? 2 tablespoons of light syrup    • Milk and yogurt:  Foods from the milk group contain about 12 g of carbohydrate per serving  ? 1 cup of fat-free or low-fat milk    ? 1 cup of soy milk    ? ? cup of fat-free, yogurt sweetened with artificial sweetener    • Non-starchy vegetables:  Each serving contains about 5 g of carbohydrate   Three servings of non-starch vegetables count as 1 carbohydrate serving  ? ½ cup of cooked vegetables or 1 cup of raw vegetables  This includes beets, broccoli, cabbage, cauliflower, cucumber, mushrooms, tomatoes, and zucchini    ? ½ cup of vegetable juice    How to use carbohydrate counting to plan meals:   • Count carbohydrate amounts using serving sizes:      ? Pasta dinner example: You plan to have pasta, tossed salad, and an 8-ounce glass of milk  Your healthcare provider tells you that you may have 4 carbohydrate servings for dinner  One carbohydrate serving of pasta is ? cup  One cup of pasta will equal 3 carbohydrate servings  An 8-ounce glass of milk will count as 1 carbohydrate serving  These amounts of food would equal 4 carbohydrate servings  One cup of tossed salad does not count toward your carbohydrate servings  • Count carbohydrate amounts using food labels:  Find the total amount of carbohydrate in a packaged food by reading the food label  Food labels tell you the serving size of the food and the total carbohydrate amount in each serving  Find the serving size on the food label and then decide how many servings you will eat  Multiply the number of servings you plan to eat by the carbohydrate amount per serving  ? Granola bar snack example: Your meal plan allows you to have 2 carbohydrate servings (30 grams) of carbohydrate for a snack  You plan to eat 1 package of granola bars, which contains 2 bars  According to the food label, the serving size of food in this package is 1 bar   Each serving (1 bar) contains 25 grams of carbohydrate  The total amount of carbohydrate in this package of granola bars would be 50 g  Based on your meal plan, you should eat only 1 bar  Follow up with your doctor as directed:  Write down your questions so you remember to ask them during your visits  © Copyright Sohan Lynne 2022 Information is for End User's use only and may not be sold, redistributed or otherwise used for commercial purposes  The above information is an  only  It is not intended as medical advice for individual conditions or treatments  Talk to your doctor, nurse or pharmacist before following any medical regimen to see if it is safe and effective for you

## 2023-04-04 ENCOUNTER — HOSPITAL ENCOUNTER (OUTPATIENT)
Dept: INFUSION CENTER | Facility: CLINIC | Age: 68
Discharge: HOME | End: 2023-04-04

## 2023-04-04 VITALS
HEART RATE: 76 BPM | BODY MASS INDEX: 30.56 KG/M2 | DIASTOLIC BLOOD PRESSURE: 86 MMHG | WEIGHT: 234.79 LBS | TEMPERATURE: 97.3 F | SYSTOLIC BLOOD PRESSURE: 144 MMHG | RESPIRATION RATE: 18 BRPM

## 2023-04-04 DIAGNOSIS — C91.10 CLL (CHRONIC LYMPHOCYTIC LEUKEMIA) (HCC): Primary | ICD-10-CM

## 2023-04-04 DIAGNOSIS — D80.1 HYPOGAMMAGLOBULINEMIA, ACQUIRED (HCC): ICD-10-CM

## 2023-04-04 RX ORDER — ACETAMINOPHEN 325 MG/1
650 TABLET ORAL ONCE
OUTPATIENT
Start: 2023-05-02

## 2023-04-04 RX ORDER — SODIUM CHLORIDE 9 MG/ML
20 INJECTION, SOLUTION INTRAVENOUS ONCE
Status: COMPLETED | OUTPATIENT
Start: 2023-04-04 | End: 2023-04-04

## 2023-04-04 RX ORDER — ACETAMINOPHEN 325 MG/1
650 TABLET ORAL ONCE
Status: COMPLETED | OUTPATIENT
Start: 2023-04-04 | End: 2023-04-04

## 2023-04-04 RX ORDER — SODIUM CHLORIDE 9 MG/ML
20 INJECTION, SOLUTION INTRAVENOUS ONCE
OUTPATIENT
Start: 2023-05-02

## 2023-04-04 RX ADMIN — DIPHENHYDRAMINE HYDROCHLORIDE 12.5 MG: 50 INJECTION, SOLUTION INTRAMUSCULAR; INTRAVENOUS at 08:23

## 2023-04-04 RX ADMIN — SODIUM CHLORIDE 20 ML/HR: 0.9 INJECTION, SOLUTION INTRAVENOUS at 08:20

## 2023-04-04 RX ADMIN — Medication 22.5 G: at 09:08

## 2023-04-04 RX ADMIN — ACETAMINOPHEN 650 MG: 325 TABLET ORAL at 08:18

## 2023-04-04 RX ADMIN — HYDROCORTISONE SODIUM SUCCINATE 100 MG: 100 INJECTION, POWDER, FOR SOLUTION INTRAMUSCULAR; INTRAVENOUS at 08:18

## 2023-04-04 NOTE — PROGRESS NOTES
Patient arrived to unit without complaint  Patient tolerated IVIG without incident  AVS provided and patient aware of next appointment  Patient left in stable condition

## 2023-04-24 DIAGNOSIS — E11.9 TYPE 2 DIABETES MELLITUS WITHOUT COMPLICATION, WITH LONG-TERM CURRENT USE OF INSULIN (HCC): ICD-10-CM

## 2023-04-24 DIAGNOSIS — Z79.4 TYPE 2 DIABETES MELLITUS WITHOUT COMPLICATION, WITH LONG-TERM CURRENT USE OF INSULIN (HCC): ICD-10-CM

## 2023-04-24 RX ORDER — INSULIN DEGLUDEC INJECTION 100 U/ML
18 INJECTION, SOLUTION SUBCUTANEOUS
Qty: 15 ML | Refills: 0 | Status: SHIPPED | OUTPATIENT
Start: 2023-04-24 | End: 2023-05-04 | Stop reason: SDUPTHER

## 2023-05-02 ENCOUNTER — HOSPITAL ENCOUNTER (OUTPATIENT)
Dept: INFUSION CENTER | Facility: CLINIC | Age: 68
Discharge: HOME | End: 2023-05-02

## 2023-05-02 ENCOUNTER — TELEPHONE (OUTPATIENT)
Dept: CARDIOLOGY CLINIC | Facility: CLINIC | Age: 68
End: 2023-05-02

## 2023-05-02 VITALS
BODY MASS INDEX: 30.36 KG/M2 | WEIGHT: 233.25 LBS | TEMPERATURE: 97.3 F | HEART RATE: 75 BPM | SYSTOLIC BLOOD PRESSURE: 157 MMHG | RESPIRATION RATE: 18 BRPM | DIASTOLIC BLOOD PRESSURE: 98 MMHG

## 2023-05-02 DIAGNOSIS — D80.1 HYPOGAMMAGLOBULINEMIA, ACQUIRED (HCC): ICD-10-CM

## 2023-05-02 DIAGNOSIS — C91.10 CLL (CHRONIC LYMPHOCYTIC LEUKEMIA) (HCC): Primary | ICD-10-CM

## 2023-05-02 RX ORDER — ACETAMINOPHEN 325 MG/1
650 TABLET ORAL ONCE
OUTPATIENT
Start: 2023-05-30

## 2023-05-02 RX ORDER — SODIUM CHLORIDE 9 MG/ML
20 INJECTION, SOLUTION INTRAVENOUS ONCE
Status: COMPLETED | OUTPATIENT
Start: 2023-05-02 | End: 2023-05-02

## 2023-05-02 RX ORDER — ACETAMINOPHEN 325 MG/1
650 TABLET ORAL ONCE
Status: COMPLETED | OUTPATIENT
Start: 2023-05-02 | End: 2023-05-02

## 2023-05-02 RX ORDER — SODIUM CHLORIDE 9 MG/ML
20 INJECTION, SOLUTION INTRAVENOUS ONCE
OUTPATIENT
Start: 2023-05-30

## 2023-05-02 RX ADMIN — ACETAMINOPHEN 650 MG: 325 TABLET ORAL at 08:44

## 2023-05-02 RX ADMIN — Medication 20 G: at 09:28

## 2023-05-02 RX ADMIN — HYDROCORTISONE SODIUM SUCCINATE 100 MG: 100 INJECTION, POWDER, FOR SOLUTION INTRAMUSCULAR; INTRAVENOUS at 08:47

## 2023-05-02 RX ADMIN — SODIUM CHLORIDE 20 ML/HR: 0.9 INJECTION, SOLUTION INTRAVENOUS at 08:35

## 2023-05-02 RX ADMIN — DIPHENHYDRAMINE HYDROCHLORIDE 12.5 MG: 50 INJECTION, SOLUTION INTRAMUSCULAR; INTRAVENOUS at 08:51

## 2023-05-04 ENCOUNTER — OFFICE VISIT (OUTPATIENT)
Dept: ENDOCRINOLOGY | Facility: CLINIC | Age: 68
End: 2023-05-04

## 2023-05-04 VITALS
BODY MASS INDEX: 31.62 KG/M2 | HEART RATE: 74 BPM | HEIGHT: 73 IN | SYSTOLIC BLOOD PRESSURE: 152 MMHG | DIASTOLIC BLOOD PRESSURE: 90 MMHG | WEIGHT: 238.6 LBS

## 2023-05-04 DIAGNOSIS — E78.2 MIXED HYPERLIPIDEMIA: ICD-10-CM

## 2023-05-04 DIAGNOSIS — E09.9 STEROID-INDUCED DIABETES MELLITUS, SEQUELA (HCC): ICD-10-CM

## 2023-05-04 DIAGNOSIS — E11.9 TYPE 2 DIABETES MELLITUS WITHOUT COMPLICATION, WITH LONG-TERM CURRENT USE OF INSULIN (HCC): Primary | ICD-10-CM

## 2023-05-04 DIAGNOSIS — I10 PRIMARY HYPERTENSION: ICD-10-CM

## 2023-05-04 DIAGNOSIS — Z79.4 TYPE 2 DIABETES MELLITUS WITHOUT COMPLICATION, WITH LONG-TERM CURRENT USE OF INSULIN (HCC): Primary | ICD-10-CM

## 2023-05-04 DIAGNOSIS — T38.0X5S STEROID-INDUCED DIABETES MELLITUS, SEQUELA (HCC): ICD-10-CM

## 2023-05-04 LAB — SL AMB POCT HEMOGLOBIN AIC: 6.6 (ref ?–6.5)

## 2023-05-04 RX ORDER — INSULIN LISPRO 100 [IU]/ML
5-17 INJECTION, SOLUTION INTRAVENOUS; SUBCUTANEOUS
Qty: 15 ML | Refills: 5 | Status: SHIPPED | OUTPATIENT
Start: 2023-05-04

## 2023-05-04 RX ORDER — INSULIN DEGLUDEC INJECTION 100 U/ML
18 INJECTION, SOLUTION SUBCUTANEOUS
Qty: 15 ML | Refills: 3 | Status: SHIPPED | OUTPATIENT
Start: 2023-05-04

## 2023-05-04 NOTE — PROGRESS NOTES
Isa Owen 75 y o  male MRN: [id number]    Encounter: [id number]      Assessment/Plan     Problem List Items Addressed This Visit        Endocrine    Diabetes mellitus ([address] ) - Primary    Relevant Medications    Continuous Blood Gluc Sensor (FreeStyle Ashley 2 Sensor) MISC    insulin lispro (Admelog SoloStar) 100 units/mL injection pen    insulin degludec Lukasz Basket FlexTouch) 100 units/mL injection pen    Other Relevant Orders    POCT hemoglobin A1c (Completed)    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HCC)     Sensor data reviewed with the patient-he is having postmeal hyperglycemia and likely sometimes bolusing after eating  Current regimen and focus on taking Premeal insulin before all meals  Lab Results   Component Value Date    HGBA1C 6 6 (A) 05/04/2023            Relevant Medications    insulin lispro (Admelog SoloStar) 100 units/mL injection pen    insulin degludec Lukasz Basket FlexTouch) 100 units/mL injection pen    Other Relevant Orders    Comprehensive metabolic panel Lab Collect    TSH, 3rd generation Lab Collect    T4, free Lab Collect    Microalbumin / creatinine urine ratio Lab Collect    Fructosamine- Lab Collect       Cardiovascular and Mediastinum    Hypertension     Blood pressure high today-continue current regimen and reassess next visit            Other    Hyperlipidemia    Relevant Orders    Lipid Panel with Direct LDL reflex Lab Collect       CC: Diabetes    History of Present Illness     HPI: 68-year-old male with steroid-induced diabetes on insulin therapy seen in follow-up  Current regimen:   Tresiba 16 units daily bedtime   Humalog 12 units before meals but if running low will reduce to 5 units  Usually eats 3 meals per day    Chemo every 2 weeks - gets steroids   Prednisone 5 mg daily -    Cayetano Lucero   Device used [facility] use       Indication   Type 2 Diabetes    More than 72 hours of data was reviewed  Report to be scanned to chart       Date Range: April 21- may 4th     Analysis of data:   Average Glucose: 165 mg/dl  Coefficient of Variation: 26 3%  Time in Target Range: 70%  Time Above Range: 30 %  Time Below Range: 0%    Interpretation of data: post meal hyperglycemia seen -some days missing premeal boluses     No polyuria , polydipsia , no blurry vision ,no numbness and tinging in feet     Weight gain        Review of Systems    Historical Information   Past Medical History:   Diagnosis Date    Allergic     Anemia     Arthritis     CAD (coronary artery disease) 2004    Cancer ([facility] )     Leukemia    Cellulitis of scalp     CKD (chronic kidney disease) stage 3, GFR 30-59 ml/min (HCC)     [facility]     COPD (chronic obstructive pulmonary disease) [facility] )     Diabetes mellitus ([facility] )     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    Dyslipidemia     Edema extremities     Esophageal ulcer     H/O blood clots     Hemolytic anemia (HCC)     Hiatal hernia     History of TIA (transient ischemic attack)     Hyperlipidemia     Hypertension     Listeria infection 2018    Multiple gastric ulcers     Myocardial infarction ([facility] ) 2004    acute    Neutropenia (HCC)     Obese     Recurrent sinusitis     Thrombocytopenia (HCC)     Vertigo      Past Surgical History:   Procedure Laterality Date    ARTHROSCOPIC REPAIR ACL      lt knee    CARDIAC SURGERY      cardiac cath with stent    FOOT SURGERY      IR PORT CHECK  5/17/2019    KNEE ARTHROSCOPY      OTHER SURGICAL HISTORY      cardiac cath lesion 1, 1st adjunct treat device: stent    PORTACATH PLACEMENT      ID ESOPHAGOGASTRODUODENOSCOPY TRANSORAL DIAGNOSTIC N/A 10/5/2017    Procedure: ESOPHAGOGASTRODUODENOSCOPY (EGD) with bx;  Surgeon: Slime Starks DO;  [facility]: [facility]; Service: Gastroenterology    ID ESOPHAGOGASTRODUODENOSCOPY TRANSORAL DIAGNOSTIC N/A 3/8/2018    Procedure: ESOPHAGOGASTRODUODENOSCOPY (EGD) with biopsy;  Surgeon: Slime Starks DO;  [facility];   Service: Gastroenterology    ID ESOPHAGOGASTRODUODENOSCOPY TRANSORAL DIAGNOSTIC N/A 2018    Procedure: ESOPHAGOGASTRODUODENOSCOPY (EGD) with Dilation;  Surgeon: Sai Fowler DO;  [facility]: [facility]; Service: Gastroenterology    GA ESOPHAGOGASTRODUODENOSCOPY TRANSORAL DIAGNOSTIC N/A 10/18/2018    Procedure: ESOPHAGOGASTRODUODENOSCOPY (EGD) with dilation;  Surgeon: Jeremiah Vaz MD;  [facility];   Service: Gastroenterology    GA ESOPHAGOSCOPY FLEXIBLE TRANSORAL WITH BIOPSY N/A 2016    Procedure: ESOPHAGOGASTRODUODENOSCOPY (EGD); ESOPHAGEAL DILATION; ESOPHAGEAL BIOPSY;  Surgeon: Mary Riley MD;  [facility];  Service: Thoracic    TONSILLECTOMY      UPPER GASTROINTESTINAL ENDOSCOPY      x 6     Social History   Social History     Substance and Sexual Activity   Alcohol Use Yes    Alcohol/week: 2 0 standard drinks    Types: 2 Cans of beer per week    Comment: social use as per [employer]     Social History     Substance and Sexual Activity   Drug Use Never     Social History     Tobacco Use   Smoking Status Former    Packs/day: 2 00    Years: 33 00    Pack years: 66 00    Types: Cigarettes    Start date:     Quit date:     Years since quittin 3   Smokeless Tobacco Never     Family History:   Family History   Problem Relation Age of Onset    Leukemia Mother         chronic    Colon polyps Mother         sidmoid    Parkinsonism Father        Meds/Allergies   Current Outpatient Medications   Medication Sig Dispense Refill    albuterol (2 5 mg/3 mL) 0 083 % nebulizer solution Take 3 mL (2 5 mg total) by nebulization every 6 (six) hours as needed for wheezing or shortness of breath 180 mL 5    albuterol (Ventolin HFA) 90 mcg/act inhaler Inhale 2 puffs every 6 (six) hours as needed for wheezing 54 g 1    amLODIPine (NORVASC) 10 mg tablet Take 1 tablet (10 mg total) by mouth daily 90 tablet 1    aspirin 81 MG tablet Take 81 mg by mouth daily Every other day      citalopram (CeleXA) 40 mg tablet TAKE 1 TABLET DAILY 90 tablet 1    Continuous Blood Gluc  (FreeStyle Ashley 2 [place]) JOE Use to scan sensor premeals and at bedtime 1 each 1    Continuous Blood Gluc Sensor (FreeStyle Ashley 2 Sensor) MISC Apply 1 sensor every 14 days  Use as directed with Freestyle Ashley 2 reader  2 each 2    fenofibrate (TRICOR) 145 mg tablet TAKE 1 TABLET DAILY 90 tablet 5    fluticasone (FLONASE) 50 mcg/act nasal spray 2 sprays into each nostril daily 16 g 0    folic acid (FOLVITE) 1 mg tablet Take 800 mcg by mouth daily       gabapentin (NEURONTIN) 600 MG tablet TAKE 1 TABLET DAILY 90 tablet 3    glucose blood (FreeStyle Precision Sujit Test) test strip Use 1 each 3 (three) times a day 50 each 0    glucose monitoring kit (FREESTYLE) monitoring kit 1 each by Does not apply route as needed ( ) E 11 9 1 each 0    insulin degludec (Tresiba FlexTouch) 100 units/mL injection pen Inject 18 Units under the skin daily at bedtime 15 mL 3    insulin lispro (Admelog SoloStar) 100 units/mL injection pen Inject 5-17 Units under the skin 3 (three) times a day with meals 15 mL 5    Insulin Pen Needle (BD Pen Needle Inez U/F) 32G X 4 MM MISC Use 3 (three) times a day 300 each 0    Lancets (FREESTYLE) lancets Use as instructed--test 2 times a day    E 11 9 100 each 0    LORazepam (ATIVAN) 0 5 mg tablet Take 1 tablet by mouth daily as needed for anxiety 30 tablet 0    losartan (COZAAR) 100 MG tablet TAKE 1 TABLET DAILY 90 tablet 5    mometasone-formoterol (Dulera) 200-5 MCG/ACT inhaler Inhale 2 puffs 2 (two) times a day Rinse mouth after use  (Patient taking differently: Inhale 2 puffs 2 (two) times a day as needed Rinse mouth after use ) 13 g 1    multivitamin (THERAGRAN) TABS Take 1 tablet by mouth daily      naloxone (NARCAN) 4 mg/0 1 mL nasal spray Administer 1 spray into a nostril  If no response after 2-3 minutes, give another dose in the other nostril using a new spray   1 each 1    obinutuzumab (GAZYVA) 1000 "mg/40 mL SOLN Infuse into a venous catheter      oxyCODONE (ROXICODONE) 10 MG TABS Take 1 tablet (10 mg total) by mouth every 6 (six) hours as needed for moderate pain For ongoing pain control Max Daily Amount: 40 mg 28 tablet 0    pantoprazole (PROTONIX) 40 mg tablet Take 1 tablet (40 mg total) by mouth 2 (two) times a day 180 tablet 3    predniSONE 5 mg tablet TAKE 1 TABLET DAILY 90 tablet 5    zolpidem (AMBIEN) 5 mg tablet Take 1 tablet (5 mg total) by mouth daily at bedtime as needed for sleep 30 tablet 0    acyclovir (ZOVIRAX) 400 MG tablet TAKE 1 TABLET TWICE A DAY (Patient not taking: Reported on 5/4/2023) 60 tablet 11    benzonatate (TESSALON) 200 MG capsule TAKE 1 CAPSULE THREE TIMES A DAY AS NEEDED FOR COUGH (Patient not taking: Reported on 5/4/2023) 20 capsule 51    Ibrutinib 140 MG TABS Take 140 mg by mouth daily 30 tablet 6    sodium chloride, PF, 0 9 % 10 mL by Intracatheter route see administration instructions 10mL into port before and after ampicillin doses (Patient not taking: Reported on 5/4/2023)  0     No current facility-administered medications for this visit  Allergies   Allergen Reactions    Heparin Other (See Comments)     Other reaction(s): Blood Disorders  clot    Macrolides And Ketolides Other (See Comments)     Interacts with other meds he is taking    Simvastatin Myalgia       Objective   Vitals: Blood pressure 152/90, pulse 74, height 6' 1\" (1 854 m), weight 108 kg (238 lb 9 6 oz)  Physical Exam  Vitals reviewed  Constitutional:       General: He is not in acute distress  Appearance: Normal appearance  He is obese  He is not ill-appearing, toxic-appearing or diaphoretic  HENT:      Head: Normocephalic and atraumatic  Eyes:      General: No scleral icterus  Extraocular Movements: Extraocular movements intact  Cardiovascular:      Rate and Rhythm: Normal rate and regular rhythm  Heart sounds: Normal heart sounds  No murmur heard    Pulmonary:      " Effort: Pulmonary effort is normal  No respiratory distress  Breath sounds: Normal breath sounds  No wheezing or rales  Abdominal:      General: There is no distension  Palpations: Abdomen is soft  Tenderness: There is no abdominal tenderness  There is no guarding  Musculoskeletal:      Cervical back: Neck supple  Right lower leg: No edema  Left lower leg: No edema  Lymphadenopathy:      Cervical: No cervical adenopathy  Skin:     General: Skin is warm and dry  Neurological:      General: No focal deficit present  Mental Status: He is alert and oriented to person, place, and time  Psychiatric:         Mood and Affect: Mood normal          Behavior: Behavior normal          Thought Content: Thought content normal          Judgment: Judgment normal          The history was obtained from the review of the chart, patient      Lab Results:   Lab Results   Component Value Date/Time    Hemoglobin A1C 6 6 (A) 05/04/2023 08:49 AM    Hemoglobin A1C 7 0 (H) 01/10/2023 08:34 AM    Hemoglobin A1C 7 0 (A) 10/26/2022 09:39 AM    WBC 4 21 (L) 04/18/2023 08:27 AM    WBC 4 60 03/21/2023 08:28 AM    WBC 4 46 02/21/2023 08:34 AM    Hemoglobin 14 2 04/18/2023 08:27 AM    Hemoglobin 14 8 03/21/2023 08:28 AM    Hemoglobin 14 5 02/21/2023 08:34 AM    Hematocrit 43 4 04/18/2023 08:27 AM    Hematocrit 44 4 03/21/2023 08:28 AM    Hematocrit 42 8 02/21/2023 08:34 AM    MCV 95 04/18/2023 08:27 AM    MCV 95 03/21/2023 08:28 AM    MCV 93 02/21/2023 08:34 AM    Platelets 789 73/20/5325 08:27 AM    Platelets 624 32/13/7378 08:28 AM    Platelets 917 93/23/1416 08:34 AM    BUN 19 04/18/2023 08:27 AM    BUN 16 03/21/2023 08:28 AM    BUN 16 02/21/2023 08:34 AM    Potassium 3 8 04/18/2023 08:27 AM    Potassium 3 8 03/21/2023 08:28 AM    Potassium 3 7 02/21/2023 08:34 AM    Chloride 109 (H) 04/18/2023 08:27 AM    Chloride 107 03/21/2023 08:28 AM    Chloride 107 02/21/2023 08:34 AM    CO2 26 04/18/2023 08:27 AM "CO2 28 03/21/2023 08:28 AM    CO2 26 02/21/2023 08:34 AM    Creatinine 1 13 04/18/2023 08:27 AM    Creatinine 1 06 03/21/2023 08:28 AM    Creatinine 1 21 02/21/2023 08:34 AM    AST 28 04/18/2023 08:27 AM    AST 33 03/21/2023 08:28 AM    AST 30 02/21/2023 08:34 AM    ALT 37 04/18/2023 08:27 AM    ALT 44 03/21/2023 08:28 AM    ALT 43 02/21/2023 08:34 AM    Albumin 4 0 04/18/2023 08:27 AM    Albumin 4 2 03/21/2023 08:28 AM    Albumin 4 2 02/21/2023 08:34 AM    HDL, Direct 38 (L) 01/10/2023 08:34 AM    Triglycerides 156 (H) 01/10/2023 08:34 AM         Portions of the record may have been created with voice recognition software  Occasional wrong word or \"sound a like\" substitutions may have occurred due to the inherent limitations of voice recognition software  Read the chart carefully and recognize, using context, where substitutions have occurred    "

## 2023-05-04 NOTE — ASSESSMENT & PLAN NOTE
Sensor data reviewed with the patient-he is having postmeal hyperglycemia and likely sometimes bolusing after eating    Current regimen and focus on taking Premeal insulin before all meals  Lab Results   Component Value Date    HGBA1C 6 6 (A) 05/04/2023

## 2023-05-09 ENCOUNTER — OFFICE VISIT (OUTPATIENT)
Dept: CARDIOLOGY CLINIC | Facility: CLINIC | Age: 68
End: 2023-05-09

## 2023-05-09 VITALS
BODY MASS INDEX: 31.68 KG/M2 | HEIGHT: 73 IN | WEIGHT: 239 LBS | OXYGEN SATURATION: 98 % | DIASTOLIC BLOOD PRESSURE: 70 MMHG | HEART RATE: 88 BPM | SYSTOLIC BLOOD PRESSURE: 126 MMHG

## 2023-05-09 DIAGNOSIS — E78.2 MIXED HYPERLIPIDEMIA: ICD-10-CM

## 2023-05-09 DIAGNOSIS — I25.10 CORONARY ARTERY DISEASE INVOLVING NATIVE CORONARY ARTERY OF NATIVE HEART WITHOUT ANGINA PECTORIS: Primary | ICD-10-CM

## 2023-05-09 DIAGNOSIS — I10 PRIMARY HYPERTENSION: ICD-10-CM

## 2023-05-09 NOTE — PROGRESS NOTES
"Assessment/Plan:    CAD (coronary artery disease)  Coronary artery disease, stable  No symptoms of angina  No signs of heart failure  Hypertension  Hypertension, stable and adequately controlled  Hyperlipidemia  Hyperlipidemia, stable  Diagnoses and all orders for this visit:    Coronary artery disease involving native coronary artery of native heart without angina pectoris    Primary hypertension    Mixed hyperlipidemia          Subjective: Feels well from the cardiac standpoint but for some dyspnea on exertion     Patient ID: Tesfaye Norton is a 75 y o  male  The patient presented to this office for the purpose of cardiac follow-up  He is known to have a history of coronary artery disease with hypertension and hyperlipidemia  The patient has been feeling rather well from the cardiac standpoint denying any symptoms of chest pain  He does have chronic mild dyspnea on exertion associated with his known chronic obstructive lung disease  He denies any symptoms of palpitation, dizziness or syncope  He has no evidence of lower extremity edema  The following portions of the patient's history were reviewed and updated as appropriate: allergies, current medications, past family history, past medical history, past social history, past surgical history and problem list     Review of Systems   Respiratory: Negative for apnea, cough, chest tightness, shortness of breath and wheezing  Cardiovascular: Negative for chest pain, palpitations and leg swelling  Gastrointestinal: Negative for abdominal pain  Neurological: Negative for dizziness and light-headedness  Psychiatric/Behavioral: Negative  Objective: Stable cardiac wise  /70 (BP Location: Left arm, Patient Position: Sitting, Cuff Size: Standard)   Pulse 88   Ht 6' 1\" (1 854 m)   Wt 108 kg (239 lb)   SpO2 98%   BMI 31 53 kg/m²          Physical Exam  Vitals reviewed     Constitutional:       General: He is not in " acute distress  Appearance: He is well-developed  He is not diaphoretic  HENT:      Head: Normocephalic  Eyes:      Pupils: Pupils are equal, round, and reactive to light  Neck:      Thyroid: No thyromegaly  Vascular: No JVD  Cardiovascular:      Rate and Rhythm: Normal rate and regular rhythm  Heart sounds: S1 normal and S2 normal  Murmur heard  Crescendo systolic murmur is present with a grade of 1/6  No friction rub  No gallop  Pulmonary:      Effort: Pulmonary effort is normal  No respiratory distress  Breath sounds: Normal breath sounds  No wheezing or rales  Chest:      Chest wall: No tenderness  Abdominal:      Palpations: Abdomen is soft  Musculoskeletal:         General: No tenderness or deformity  Normal range of motion  Cervical back: Normal range of motion  Right lower leg: No edema  Left lower leg: No edema  Skin:     General: Skin is warm and dry  Neurological:      Mental Status: He is alert and oriented to person, place, and time     Psychiatric:         Mood and Affect: Mood normal          Behavior: Behavior normal

## 2023-05-09 NOTE — LETTER
May 9, 2023     Grecia Roth DO  [address]  [address] [place] [address]    Patient: Hossein Flynn   YOB: 1955   Date of Visit: 5/9/2023       Dear Dr Shraddha Hood:    Thank you for referring Stan Regalado to me for evaluation  Below are my notes for this consultation  If you have questions, please do not hesitate to call me  I look forward to following your patient along with you  Sincerely,        Dana Vera MD        CC: No Recipients  Dana Vera MD  5/9/2023 11:20 AM  Sign when Signing Visit  Assessment/Plan:    CAD (coronary artery disease)  Coronary artery disease, stable  No symptoms of angina  No signs of heart failure  Hypertension  Hypertension, stable and adequately controlled  Hyperlipidemia  Hyperlipidemia, stable  Diagnoses and all orders for this visit:    Coronary artery disease involving native coronary artery of native heart without angina pectoris    Primary hypertension    Mixed hyperlipidemia         Subjective: Feels well from the cardiac standpoint but for some dyspnea on exertion    Patient ID: Hossein Flynn is a 75 y o  male  The patient presented to this office for the purpose of cardiac follow-up  He is known to have a history of coronary artery disease with hypertension and hyperlipidemia  The patient has been feeling rather well from the cardiac standpoint denying any symptoms of chest pain  He does have chronic mild dyspnea on exertion associated with his known chronic obstructive lung disease  He denies any symptoms of palpitation, dizziness or syncope  He has no evidence of lower extremity edema        The following portions of the patient's history were reviewed and updated as appropriate: allergies, current medications, past family history, past medical history, past social history, past surgical history and problem list     Review of Systems   Respiratory: Negative for apnea, cough, chest tightness, shortness of "breath and wheezing  Cardiovascular: Negative for chest pain, palpitations and leg swelling  Gastrointestinal: Negative for abdominal pain  Neurological: Negative for dizziness and light-headedness  Psychiatric/Behavioral: Negative  Objective: Stable cardiac wise  /70 (BP Location: Left arm, Patient Position: Sitting, Cuff Size: Standard)   Pulse 88   Ht 6' 1\" (1 854 m)   Wt 108 kg (239 lb)   SpO2 98%   BMI 31 53 kg/m²         Physical Exam  Vitals reviewed  Constitutional:       General: He is not in acute distress  Appearance: He is well-developed  He is not diaphoretic  HENT:      Head: Normocephalic  Eyes:      Pupils: Pupils are equal, round, and reactive to light  Neck:      Thyroid: No thyromegaly  Vascular: No JVD  Cardiovascular:      Rate and Rhythm: Normal rate and regular rhythm  Heart sounds: S1 normal and S2 normal  Murmur heard  Crescendo systolic murmur is present with a grade of 1/6  No friction rub  No gallop  Pulmonary:      Effort: Pulmonary effort is normal  No respiratory distress  Breath sounds: Normal breath sounds  No wheezing or rales  Chest:      Chest wall: No tenderness  Abdominal:      Palpations: Abdomen is soft  Musculoskeletal:         General: No tenderness or deformity  Normal range of motion  Cervical back: Normal range of motion  Right lower leg: No edema  Left lower leg: No edema  Skin:     General: Skin is warm and dry  Neurological:      Mental Status: He is alert and oriented to person, place, and time     Psychiatric:         Mood and Affect: Mood normal          Behavior: Behavior normal           "

## 2023-05-10 ENCOUNTER — TELEPHONE (OUTPATIENT)
Dept: ENDOCRINOLOGY | Facility: CLINIC | Age: 68
End: 2023-05-10

## 2023-05-10 RX ORDER — ACETAMINOPHEN 325 MG/1
650 TABLET ORAL ONCE
Status: CANCELLED | OUTPATIENT
Start: 2023-05-16

## 2023-05-10 RX ORDER — SODIUM CHLORIDE 9 MG/ML
20 INJECTION, SOLUTION INTRAVENOUS ONCE
Status: CANCELLED | OUTPATIENT
Start: 2023-05-16

## 2023-05-16 ENCOUNTER — HOSPITAL ENCOUNTER (OUTPATIENT)
Dept: INFUSION CENTER | Facility: CLINIC | Age: 68
Discharge: HOME | End: 2023-05-16

## 2023-05-16 VITALS
WEIGHT: 233.25 LBS | HEART RATE: 77 BPM | DIASTOLIC BLOOD PRESSURE: 90 MMHG | BODY MASS INDEX: 30.77 KG/M2 | RESPIRATION RATE: 18 BRPM | TEMPERATURE: 98 F | SYSTOLIC BLOOD PRESSURE: 156 MMHG

## 2023-05-16 DIAGNOSIS — C91.10 CLL (CHRONIC LYMPHOCYTIC LEUKEMIA) (HCC): Primary | ICD-10-CM

## 2023-05-16 LAB
ALBUMIN SERPL BCP-MCNC: 4.2 G/DL (ref 3.5–5)
ALP SERPL-CCNC: 49 U/L (ref 34–104)
ALT SERPL W P-5'-P-CCNC: 34 U/L (ref 7–52)
ANION GAP SERPL CALCULATED.3IONS-SCNC: 5 MMOL/L (ref 4–13)
AST SERPL W P-5'-P-CCNC: 29 U/L (ref 13–39)
BASOPHILS # BLD MANUAL: 0 THOUSAND/UL (ref 0–0.1)
BASOPHILS NFR MAR MANUAL: 0 % (ref 0–1)
BILIRUB SERPL-MCNC: 0.49 MG/DL (ref 0.2–1)
BUN SERPL-MCNC: 22 MG/DL (ref 5–25)
CALCIUM SERPL-MCNC: 9.3 MG/DL (ref 8.4–10.2)
CHLORIDE SERPL-SCNC: 108 MMOL/L (ref 96–108)
CO2 SERPL-SCNC: 26 MMOL/L (ref 21–32)
CREAT SERPL-MCNC: 1.08 MG/DL (ref 0.6–1.3)
EOSINOPHIL # BLD MANUAL: 0.14 THOUSAND/UL (ref 0–0.4)
EOSINOPHIL NFR BLD MANUAL: 3 % (ref 0–6)
ERYTHROCYTE [DISTWIDTH] IN BLOOD BY AUTOMATED COUNT: 13.6 % (ref 11.6–15.1)
GFR SERPL CREATININE-BSD FRML MDRD: 70 ML/MIN/1.73SQ M
GLUCOSE SERPL-MCNC: 150 MG/DL (ref 65–140)
HCT VFR BLD AUTO: 44.1 % (ref 36.5–49.3)
HGB BLD-MCNC: 14.2 G/DL (ref 12–17)
IGG SERPL-MCNC: 555 MG/DL (ref 700–1600)
LYMPHOCYTES # BLD AUTO: 0.91 THOUSAND/UL (ref 0.6–4.47)
LYMPHOCYTES # BLD AUTO: 19 % (ref 14–44)
MCH RBC QN AUTO: 31 PG (ref 26.8–34.3)
MCHC RBC AUTO-ENTMCNC: 32.2 G/DL (ref 31.4–37.4)
MCV RBC AUTO: 96 FL (ref 82–98)
MONOCYTES # BLD AUTO: 0.77 THOUSAND/UL (ref 0–1.22)
MONOCYTES NFR BLD: 16 % (ref 4–12)
NEUTROPHILS # BLD MANUAL: 2.97 THOUSAND/UL (ref 1.85–7.62)
NEUTS BAND NFR BLD MANUAL: 1 % (ref 0–8)
NEUTS SEG NFR BLD AUTO: 61 % (ref 43–75)
PLATELET # BLD AUTO: 156 THOUSANDS/UL (ref 149–390)
PLATELET BLD QL SMEAR: ADEQUATE
PMV BLD AUTO: 11.5 FL (ref 8.9–12.7)
POTASSIUM SERPL-SCNC: 3.8 MMOL/L (ref 3.5–5.3)
PROT SERPL-MCNC: 6.2 G/DL (ref 6.4–8.4)
RBC # BLD AUTO: 4.58 MILLION/UL (ref 3.88–5.62)
RBC MORPH BLD: NORMAL
RETICS # AUTO: NORMAL 10*3/UL (ref 14356–105094)
RETICS # CALC: 1.76 % (ref 0.37–1.87)
SODIUM SERPL-SCNC: 139 MMOL/L (ref 135–147)
WBC # BLD AUTO: 4.79 THOUSAND/UL (ref 4.31–10.16)

## 2023-05-16 RX ORDER — SODIUM CHLORIDE 9 MG/ML
20 INJECTION, SOLUTION INTRAVENOUS ONCE
Status: COMPLETED | OUTPATIENT
Start: 2023-05-16 | End: 2023-05-16

## 2023-05-16 RX ORDER — ACETAMINOPHEN 325 MG/1
650 TABLET ORAL ONCE
Status: COMPLETED | OUTPATIENT
Start: 2023-05-16 | End: 2023-05-16

## 2023-05-16 RX ADMIN — ACETAMINOPHEN 650 MG: 325 TABLET ORAL at 08:25

## 2023-05-16 RX ADMIN — DEXAMETHASONE SODIUM PHOSPHATE 20 MG: 10 INJECTION, SOLUTION INTRAMUSCULAR; INTRAVENOUS at 08:25

## 2023-05-16 RX ADMIN — DIPHENHYDRAMINE HYDROCHLORIDE 25 MG: 50 INJECTION, SOLUTION INTRAMUSCULAR; INTRAVENOUS at 08:48

## 2023-05-16 RX ADMIN — SODIUM CHLORIDE 20 ML/HR: 0.9 INJECTION, SOLUTION INTRAVENOUS at 08:24

## 2023-05-16 RX ADMIN — OBINUTUZUMAB 1000 MG: 1000 INJECTION, SOLUTION, CONCENTRATE INTRAVENOUS at 09:55

## 2023-05-31 ENCOUNTER — OFFICE VISIT (OUTPATIENT)
Dept: HEMATOLOGY ONCOLOGY | Facility: CLINIC | Age: 68
End: 2023-05-31

## 2023-05-31 VITALS
SYSTOLIC BLOOD PRESSURE: 142 MMHG | HEIGHT: 73 IN | BODY MASS INDEX: 31.14 KG/M2 | HEART RATE: 83 BPM | TEMPERATURE: 98.1 F | DIASTOLIC BLOOD PRESSURE: 68 MMHG | RESPIRATION RATE: 18 BRPM | WEIGHT: 235 LBS | OXYGEN SATURATION: 96 %

## 2023-05-31 DIAGNOSIS — F11.20 NARCOTIC DEPENDENCE (HCC): ICD-10-CM

## 2023-05-31 DIAGNOSIS — J44.9 CHRONIC OBSTRUCTIVE PULMONARY DISEASE, UNSPECIFIED COPD TYPE (HCC): ICD-10-CM

## 2023-05-31 DIAGNOSIS — D75.829 HIT (HEPARIN-INDUCED THROMBOCYTOPENIA) (HCC): ICD-10-CM

## 2023-05-31 DIAGNOSIS — D80.1 HYPOGAMMAGLOBULINEMIA, ACQUIRED (HCC): ICD-10-CM

## 2023-05-31 DIAGNOSIS — D59.10 AUTOIMMUNE HEMOLYTIC ANEMIA (HCC): ICD-10-CM

## 2023-05-31 DIAGNOSIS — E11.42 TYPE 2 DIABETES MELLITUS WITH DIABETIC POLYNEUROPATHY, WITH LONG-TERM CURRENT USE OF INSULIN (HCC): ICD-10-CM

## 2023-05-31 DIAGNOSIS — G89.3 CANCER RELATED PAIN: ICD-10-CM

## 2023-05-31 DIAGNOSIS — Z79.4 TYPE 2 DIABETES MELLITUS WITH DIABETIC POLYNEUROPATHY, WITH LONG-TERM CURRENT USE OF INSULIN (HCC): ICD-10-CM

## 2023-05-31 DIAGNOSIS — I10 PRIMARY HYPERTENSION: ICD-10-CM

## 2023-05-31 DIAGNOSIS — I25.10 CORONARY ARTERY DISEASE INVOLVING NATIVE CORONARY ARTERY OF NATIVE HEART WITHOUT ANGINA PECTORIS: ICD-10-CM

## 2023-05-31 DIAGNOSIS — C91.10 CLL (CHRONIC LYMPHOCYTIC LEUKEMIA) (HCC): Primary | ICD-10-CM

## 2023-05-31 DIAGNOSIS — D69.6 THROMBOCYTOPENIA (HCC): ICD-10-CM

## 2023-05-31 RX ORDER — OXYCODONE HYDROCHLORIDE 10 MG/1
10 TABLET ORAL EVERY 6 HOURS PRN
Qty: 28 TABLET | Refills: 0 | Status: SHIPPED | OUTPATIENT
Start: 2023-05-31

## 2023-05-31 RX ORDER — NALOXONE HYDROCHLORIDE 4 MG/.1ML
SPRAY NASAL
Qty: 1 EACH | Refills: 1 | Status: SHIPPED | OUTPATIENT
Start: 2023-05-31 | End: 2024-05-30

## 2023-05-31 NOTE — LETTER
May 31, 2023     Ileana Dominguez DO  [address]  [address] [facility]    Patient: Mellissa Butt   YOB: 1955   Date of Visit: 5/31/2023       Dear Dr Gaurav Mann:    Thank you for referring Radha Miller to me for evaluation  Below are my notes for this consultation  If you have questions, please do not hesitate to call me  I look forward to following your patient along with you  Sincerely,        Isamar Davis MD        CC: No Recipients    Isamar Davis MD  5/31/2023  8:48 AM  Sign when Signing Visit    HPI: Patient is on low-dose ibrutinib 140 mg daily, Gazyva once a month and IVIG once a month  Also he is on 5 mg prednisone daily and folic acid  Patient is being treated for  CLL with history of profound anemia, thrombocytopenia and low IgG level  CLL was diagnosed more than 20 years ago and he had multiple lines of therapies and at 1 time his hemoglobin was down to 4 9 because of  autoimmune hemolytic anemia and CLL  He also has history of heparin-induced thrombocytopenia and he knows that  He has tiredness, chronic nonproductive cough, exertional dyspnea, occasionally wheezing, arthritic symptoms and for that he is on oxycodone  He has  vascular purpura on forearms  He has chronic lung disease  History of diabetes mellitus, hypertension and coronary artery disease     For dysphagia he had EGD and dilatation in September 2020 and he follows with his gastroenterologist         He is taking vitamin-D and calcium and tries to stay active to prevent worsening of 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  He has coronary artery disease and has 1 stent and he takes 81 mg aspirin daily with food  CLL was diagnosed several years ago  He had been through Fludara based chemotherapy and pentostatin based chemotherapy  He had increased hemolysis when he was on chemotherapy   His most recent chemotherapy treatment was Cytoxan, Oncovin, prednisone and Rituxan and last treatment was in December 2012    In 2013 he was started on Rituxan, prednisone and folic acid because he started to [facility] again  IVIG was tried unsuccessfully so far as hemolysis is concerned  Rituxan has controlled the hemolysis    Information on the treatments from March 2017 onwards  On 3/20/17 patient found to have hemoglobin of 6 2, ,000  He was admitted to [facility] for blood transfusion and plan to transfer to [address] [place] [address]  On 3/20/17 WBC count 195,000, hemoglobin 4 9, platelet count 65  Direct coomb's was positive with undetectable haptoglobin  He was given 2 units of PRBCs, Solu-Medrol 1 g ×3 days and IVIG 1 g/kg daily ×3 days  His hemoglobin continued to drop  Bone marrow biopsy on 3/21 showed marrow cellularity of greater than 95% almost replaced by small lymphocytes, lambda light chain restricted, CD20 positive, CD19 positive, CD23 positive partially expressing CD11c and CD25 and CD5 positive and negative for CD 79 and CD38  He was treated with single dose of chlorambucil to control his CLL   3/22 second dose of chlorambucil, also Rituxan 375 mg/M ² autoimmune hemolytic anemia  WBC continued to rise, 266,000  Patient was initiated with Venetoclax 20 mg daily  Tumor lysis monitored every 8 hours; given aggressive hydration and Lasix and remained euvolemic  Later venetoclax was changed to ibrutinib because of disease progression  Dec 2017- Imbruvica decreased to 140 mg PO daily due to thrombocytopenia   Later Rodrigo Webb was added   4/23 - 4/27/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  He was discharged on IV ampicillin  Echo negative for vegetations     He is doing very well on present treatment plan suppressed CLL is concerned                         Current Outpatient Medications:   •  albuterol (2 5 mg/3 mL) 0 083 % nebulizer solution, Take 3 mL (2 5 mg total) by nebulization every 6 (six) hours as needed for wheezing or shortness of breath, Disp: 180 mL, Rfl: 5  •  albuterol (Ventolin HFA) 90 mcg/act inhaler, Inhale 2 puffs every 6 (six) hours as needed for wheezing, Disp: 54 g, Rfl: 1  •  amLODIPine (NORVASC) 10 mg tablet, Take 1 tablet (10 mg total) by mouth daily, Disp: 90 tablet, Rfl: 1  •  aspirin 81 MG tablet, Take 81 mg by mouth daily Every other day, Disp: , Rfl:   •  citalopram (CeleXA) 40 mg tablet, TAKE 1 TABLET DAILY, Disp: 90 tablet, Rfl: 1  •  Continuous Blood Gluc  (FreeStyle Ashley 2 [place]) JOE, Use to scan sensor premeals and at bedtime, Disp: 1 each, Rfl: 1  •  Continuous Blood Gluc Sensor (FreeStyle Ashley 2 Sensor) MISC, Apply 1 sensor every 14 days   Use as directed with Freestyle Ashley 2 reader , Disp: 2 each, Rfl: 2  •  fenofibrate (TRICOR) 145 mg tablet, TAKE 1 TABLET DAILY, Disp: 90 tablet, Rfl: 5  •  fluticasone (FLONASE) 50 mcg/act nasal spray, 2 sprays into each nostril daily, Disp: 16 g, Rfl: 0  •  folic acid (FOLVITE) 1 mg tablet, Take 800 mcg by mouth daily , Disp: , Rfl:   •  gabapentin (NEURONTIN) 600 MG tablet, TAKE 1 TABLET DAILY, Disp: 90 tablet, Rfl: 3  •  glucose blood (FreeStyle Precision Sujit Test) test strip, Use 1 each 3 (three) times a day, Disp: 50 each, Rfl: 0  •  glucose monitoring kit (FREESTYLE) monitoring kit, 1 each by Does not apply route as needed ( ) E 11 9, Disp: 1 each, Rfl: 0  •  Ibrutinib 140 MG TABS, Take 140 mg by mouth daily, Disp: 30 tablet, Rfl: 6  •  insulin degludec Barbette [place] FlexTouch) 100 units/mL injection pen, Inject 18 Units under the skin daily at bedtime, Disp: 15 mL, Rfl: 3  •  insulin lispro (Admelog SoloStar) 100 units/mL injection pen, Inject 5-17 Units under the skin 3 (three) times a day with meals, Disp: 15 mL, Rfl: 5  •  Insulin Pen Needle (BD Pen Needle Inez U/F) 32G X 4 MM MISC, Use 3 (three) times a day, Disp: 300 each, Rfl: 0  •  Lancets (FREESTYLE) lancets, Use as instructed--test 2 times a day  E 11 9, Disp: 100 each, Rfl: 0  •  LORazepam (ATIVAN) 0 5 mg tablet, Take 1 tablet by mouth daily as needed for anxiety, Disp: 30 tablet, Rfl: 0  •  losartan (COZAAR) 100 MG tablet, TAKE 1 TABLET DAILY, Disp: 90 tablet, Rfl: 5  •  mometasone-formoterol (Dulera) 200-5 MCG/ACT inhaler, Inhale 2 puffs 2 (two) times a day Rinse mouth after use , Disp: 13 g, Rfl: 1  •  multivitamin (THERAGRAN) TABS, Take 1 tablet by mouth daily, Disp: , Rfl:   •  naloxone (NARCAN) 4 mg/0 1 mL nasal spray, Administer 1 spray into a nostril   If no response after 2-3 minutes, give another dose in the other nostril using a new spray , Disp: 1 each, Rfl: 1  •  obinutuzumab (GAZYVA) 1000 mg/40 mL SOLN, Infuse into a venous catheter, Disp: , Rfl:   •  oxyCODONE (ROXICODONE) 10 MG TABS, Take 1 tablet (10 mg total) by mouth every 6 (six) hours as needed for moderate pain For ongoing pain control Max Daily Amount: 40 mg, Disp: 28 tablet, Rfl: 0  •  pantoprazole (PROTONIX) 40 mg tablet, Take 1 tablet (40 mg total) by mouth 2 (two) times a day, Disp: 180 tablet, Rfl: 3  •  predniSONE 5 mg tablet, TAKE 1 TABLET DAILY, Disp: 90 tablet, Rfl: 5  •  zolpidem (AMBIEN) 5 mg tablet, Take 1 tablet (5 mg total) by mouth daily at bedtime as needed for sleep, Disp: 30 tablet, Rfl: 0  •  acyclovir (ZOVIRAX) 400 MG tablet, TAKE 1 TABLET TWICE A DAY (Patient not taking: Reported on 5/4/2023), Disp: 60 tablet, Rfl: 11  •  benzonatate (TESSALON) 200 MG capsule, TAKE 1 CAPSULE THREE TIMES A DAY AS NEEDED FOR COUGH (Patient not taking: Reported on 5/4/2023), Disp: 20 capsule, Rfl: 51  •  sodium chloride, PF, 0 9 %, 10 mL by Intracatheter route see administration instructions 10mL into port before and after ampicillin doses (Patient not taking: Reported on 5/4/2023), Disp: , Rfl: 0    Allergies   Allergen Reactions   • Heparin Other (See Comments)     Other reaction(s): Blood Disorders  clot   • Macrolides And Ketolides Other (See Comments)     Interacts with other meds he is taking   • Simvastatin Myalgia       Oncology History [facility]chronic [facility] )   8/22/2017 -  Chemotherapy    chlorambucil (LEUKERAN), 0 5 mg/kg, Oral, Every 14 days, 6 of 6 cycles  obinutuzumab (GAZYVA) day 1 IVPB, 100 mg, Intravenous, Once, 1 of 1 cycle  obinutuzumab (GAZYVA) day 2 titrated infusion, 900 mg, Intravenous, Once, 1 of 1 cycle  obinutuzumab (GAZYVA) subsequent titrated infusion, 1,000 mg, Intravenous, Once, 57 of 59 cycles  Administration: 1,000 mg (5/21/2019), 1,000 mg (6/18/2019), 1,000 mg (7/16/2019), 1,000 mg (8/13/2019), 1,000 mg (9/10/2019), 1,000 mg (10/8/2019), 1,000 mg (11/5/2019), 1,000 mg (12/3/2019), 1,000 mg (12/31/2019), 1,000 mg (1/28/2020), 1,000 mg (2/25/2020), 1,000 mg (3/24/2020), 1,000 mg (4/21/2020), 1,000 mg (5/19/2020), 1,000 mg (6/16/2020), 1,000 mg (7/14/2020), 1,000 mg (8/11/2020), 1,000 mg (9/9/2020), 1,000 mg (10/6/2020), 1,000 mg (11/3/2020), 1,000 mg (12/1/2020), 1,000 mg (1/26/2021), 1,000 mg (12/29/2020), 1,000 mg (2/23/2021), 1,000 mg (3/23/2021), 1,000 mg (4/20/2021), 1,000 mg (5/18/2021), 1,000 mg (6/15/2021), 1,000 mg (7/13/2021), 1,000 mg (8/10/2021), 1,000 mg (9/7/2021), 1,000 mg (10/5/2021), 1,000 mg (11/2/2021), 1,000 mg (11/30/2021), 1,000 mg (12/28/2021), 1,000 mg (1/25/2022), 1,000 mg (2/22/2022), 1,000 mg (3/22/2022), 1,000 mg (5/17/2022), 1,000 mg (6/14/2022), 1,000 mg (7/12/2022), 1,000 mg (8/9/2022), 1,000 mg (9/6/2022), 1,000 mg (10/4/2022), 1,000 mg (11/1/2022), 1,000 mg (11/29/2022), 1,000 mg (1/24/2023), 1,000 mg (2/21/2023), 1,000 mg (3/21/2023), 1,000 mg (4/18/2023), 1,000 mg (5/16/2023)     4/24/2018 Initial Diagnosis    CLL (chronic lymphocytic leukemia) ([facility] )         ROS:  05/31/23 Reviewed 13 systems: See symptoms in HPI  Presently no other neurological, cardiac, pulmonary, GI and  symptoms other than listed in HPI  Other symptoms are in HPI  No other symptoms like fever, chills, bleeding, bone pains, skin rash, weight loss,  numbness,  claudication and gait problem   No "frequent infections but had them before  Not unusually sensitive to heat or cold  No swelling of the ankles  No swollen glands  Patient is anxious  /68 (BP Location: Left arm)   Pulse 83   Temp 98 1 °F (36 7 °C) (Tympanic)   Resp 18   Ht 6' 0 99\" (1 854 m)   Wt 107 kg (235 lb)   SpO2 96%   BMI 31 01 kg/m²     Physical Exam:  Patient is alert and oriented  Patient is not in distress  Vitals are above  There is no icterus  There is no palpable neck mass  Lungs clear clear to percussion and auscultation except for few scattered rhonchi  Heart rate is regular  There is no palpable abdominal mass  Abdomen is soft and nontender  There is no ascites  There is no edema of the ankles  There is no calf tenderness  There is no focal neurological deficit  There is no skin rash  There is vascular purpura on forearms  Lymph nodes are not palpably enlarged in the neck and axillary areas  No clubbing  Patient is anxious  Performance status 2        IMAGING:      LABS:    Results for orders placed or performed during the hospital encounter of 05/16/23   CBC and differential   Result Value Ref Range    WBC 4 79 4 31 - 10 16 Thousand/uL    RBC 4 58 3 88 - 5 62 Million/uL    Hemoglobin 14 2 12 0 - 17 0 g/dL    Hematocrit 44 1 36 5 - 49 3 %    MCV 96 82 - 98 fL    MCH 31 0 26 8 - 34 3 pg    MCHC 32 2 31 4 - 37 4 g/dL    RDW 13 6 11 6 - 15 1 %    MPV 11 5 8 9 - 12 7 fL    Platelets 918 800 - 905 Thousands/uL   Comprehensive metabolic panel   Result Value Ref Range    Sodium 139 135 - 147 mmol/L    Potassium 3 8 3 5 - 5 3 mmol/L    Chloride 108 96 - 108 mmol/L    CO2 26 21 - 32 mmol/L    ANION GAP 5 4 - 13 mmol/L    BUN 22 5 - 25 mg/dL    Creatinine 1 08 0 60 - 1 30 mg/dL    Glucose 150 (H) 65 - 140 mg/dL    Calcium 9 3 8 4 - 10 2 mg/dL    AST 29 13 - 39 U/L    ALT 34 7 - 52 U/L    Alkaline Phosphatase 49 34 - 104 U/L    Total Protein 6 2 (L) 6 4 - 8 4 g/dL    Albumin 4 2 3 5 - 5 0 g/dL    Total " Bilirubin 0 49 0 20 - 1 00 mg/dL    eGFR 70 ml/min/1 73sq m   IgG   Result Value Ref Range     0 (L) 700 0 - 1,600 0 mg/dL   Retic Count   Result Value Ref Range    Retic Ct Abs 80,600 14,356 - 105,094    Retic Ct Pct 1 76 0 37 - 1 87 %   Manual Differential(PHLEBS Do Not Order)   Result Value Ref Range    Segmented % 61 43 - 75 %    Bands % 1 0 - 8 %    Lymphocytes % 19 14 - 44 %    Monocytes % 16 (H) 4 - 12 %    Eosinophils, % 3 0 - 6 %    Basophils % 0 0 - 1 %    Absolute Neutrophils 2 97 1 85 - 7 62 Thousand/uL    Lymphocytes Absolute 0 91 0 60 - 4 47 Thousand/uL    Monocytes Absolute 0 77 0 00 - 1 22 Thousand/uL    Eosinophils Absolute 0 14 0 00 - 0 40 Thousand/uL    Basophils Absolute 0 00 0 00 - 0 10 Thousand/uL    Total Counted      RBC Morphology Normal     Platelet Estimate Adequate Adequate     *Note: Due to a large number of results and/or encounters for the requested time period, some results have not been displayed  A complete set of results can be found in Results Review      Labs, Imaging, & Other studies:   All pertinent labs and imaging studies were personally reviewed      Reviewed test results and discussed with patient     Assessment and plan:  Patient is on low-dose ibrutinib 140 mg daily, Gazyva once a month and IVIG once a month  Also he is on 5 mg prednisone daily and folic acid  Patient is being treated for  CLL with history of profound anemia, thrombocytopenia and low IgG level  CLL was diagnosed more than 20 years ago and he had multiple lines of therapies and at 1 time his hemoglobin was down to 4 9 because of  autoimmune hemolytic anemia and CLL  He also has history of heparin-induced thrombocytopenia and he knows that  He has tiredness, chronic nonproductive cough, exertional dyspnea, occasionally wheezing, arthritic symptoms and for that he is on oxycodone  He has  vascular purpura on forearms  He has chronic lung disease    History of diabetes mellitus, hypertension and coronary artery disease     For dysphagia he had EGD and dilatation in September 2020 and he follows with his gastroenterologist         He is taking vitamin-D and calcium and tries to stay active to prevent worsening of 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  He has coronary artery disease and has 1 stent and he takes 81 mg aspirin daily with food  CLL was diagnosed several years ago  He had been through Fludara based chemotherapy and pentostatin based chemotherapy  He had increased hemolysis when he was on chemotherapy  His most recent chemotherapy treatment was Cytoxan, Oncovin, prednisone and Rituxan and last treatment was in December 2012    In 2013 he was started on Rituxan, prednisone and folic acid because he started to [facility] again  IVIG was tried unsuccessfully so far as hemolysis is concerned  Rituxan has controlled the hemolysis    Information on the treatments from March 2017 onwards  On 3/20/17 patient found to have hemoglobin of 6 2, ,000  He was admitted to [facility] for blood transfusion and plan to transfer to [address] [place] [address]  On 3/20/17 WBC count 195,000, hemoglobin 4 9, platelet count 65  Direct coomb's was positive with undetectable haptoglobin  He was given 2 units of PRBCs, Solu-Medrol 1 g ×3 days and IVIG 1 g/kg daily ×3 days  His hemoglobin continued to drop  Bone marrow biopsy on 3/21 showed marrow cellularity of greater than 95% almost replaced by small lymphocytes, lambda light chain restricted, CD20 positive, CD19 positive, CD23 positive partially expressing CD11c and CD25 and CD5 positive and negative for CD 79 and CD38  He was treated with single dose of chlorambucil to control his CLL   3/22 second dose of chlorambucil, also Rituxan 375 mg/M ² autoimmune hemolytic anemia  WBC continued to rise, 266,000  Patient was initiated with Venetoclax 20 mg daily   Tumor lysis monitored every 8 hours; given aggressive hydration and Lasix and remained euvolemic  Later venetoclax was changed to ibrutinib because of disease progression  Dec 2017- Imbruvica decreased to 140 mg PO daily due to thrombocytopenia   Later Cash Morton was added   4/23 - 4/27/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  He was discharged on IV ampicillin  Echo negative for vegetations  He is doing very well on present treatment plan suppressed CLL is concerned        Physical examination and test results are as recorded and discussed in very much detail  Hematologically stable  No change in therapy  All discussed in detail  Questions answered  Renewed oxycodone  Also prescribed Narcan  Discussed the importance of eating healthy foods, staying active and health screening tests  Patient to avoid trauma and falls  Goal is prolongation of survival and prevention of complications  Patient is capable of self-care  Discussed precautions against coronavirus and other infections  Patient is immunocompromised   Kailey Infante See diagnoses, orders and instructions  1  CLL (chronic lymphocytic leukemia) (HCC)    - CBC and differential; Standing  - Comprehensive metabolic panel; Standing  - IgG; Standing  - CBC and differential  - Comprehensive metabolic panel  - IgG    2  Autoimmune hemolytic anemia (HCC)      3  Thrombocytopenia ([facility] )      4  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[facility])      5  Hypogammaglobulinemia, acquired ([facility] )      6  Cancer related pain    - oxyCODONE (ROXICODONE) 10 MG TABS; Take 1 tablet (10 mg total) by mouth every 6 (six) hours as needed for moderate pain For ongoing pain control Max Daily Amount: 40 mg  Dispense: 28 tablet; Refill: 0  - naloxone (NARCAN) 4 mg/0 1 mL nasal spray; Administer 1 spray into a nostril  If no response after 2-3 minutes, give another dose in the other nostril using a new spray  Dispense: 1 each; Refill: 1    7  Chronic obstructive pulmonary disease, unspecified COPD type ([facility] )      8   Type 2 diabetes mellitus with diabetic polyneuropathy, with long-term current use of insulin ([facility] )      9  Coronary artery disease involving native coronary artery of native heart without angina pectoris      10  Primary hypertension      11  Narcotic dependence (HCC)    - naloxone (NARCAN) 4 mg/0 1 mL nasal spray; Administer 1 spray into a nostril  If no response after 2-3 minutes, give another dose in the other nostril using a new spray  Dispense: 1 each; Refill: 1                 Patient will continue to follow with his primary physician and other consultants      I used dictation device to dictate this note and there could be mistakes in my note and for that he may call my office      Patient voiced understanding and agreed      Counseling / Coordination of Care      Provided counseling and support

## 2023-05-31 NOTE — PROGRESS NOTES
HPI: Patient is on low-dose ibrutinib 140 mg daily, Gazyva once a month and IVIG once a month  Also he is on 5 mg prednisone daily and folic acid  Patient is being treated for  CLL with history of profound anemia, thrombocytopenia and low IgG level  CLL was diagnosed more than 20 years ago and he had multiple lines of therapies and at 1 time his hemoglobin was down to 4 9 because of  autoimmune hemolytic anemia and CLL  He also has history of heparin-induced thrombocytopenia and he knows that  He has tiredness, chronic nonproductive cough, exertional dyspnea, occasionally wheezing, arthritic symptoms and for that he is on oxycodone  He has  vascular purpura on forearms  He has chronic lung disease  History of diabetes mellitus, hypertension and coronary artery disease     For dysphagia he had EGD and dilatation in September 2020 and he follows with his gastroenterologist         He is taking vitamin-D and calcium and tries to stay active to prevent worsening of 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    [facility] [place] has coronary artery disease and has 1 stent and he takes 81 mg aspirin daily with food     CLL was diagnosed several years ago  He had been through Fludara based chemotherapy and pentostatin based chemotherapy  He had increased hemolysis when he was on chemotherapy  His most recent chemotherapy treatment was Cytoxan, Oncovin, prednisone and Rituxan and last treatment was in December 2012    In 2013 he was started on Rituxan, prednisone and folic acid because he started to [facility] again  IVIG was tried unsuccessfully so far as hemolysis is concerned  Rituxan has controlled the hemolysis    Information on the treatments from March 2017 onwards  On 3/20/17 patient found to have hemoglobin of 6 2, ,000  He was admitted to [address] [place] [address] for blood transfusion and plan to transfer to [address] [place] [address]     On 3/20/17 WBC count 195,000, hemoglobin 4 9, platelet count 65  Direct coomb's was positive with undetectable haptoglobin  He was given 2 units of PRBCs, Solu-Medrol 1 g ×3 days and IVIG 1 g/kg daily ×3 days  His hemoglobin continued to drop  Bone marrow biopsy on 3/21 showed marrow cellularity of greater than 95% almost replaced by small lymphocytes, lambda light chain restricted, CD20 positive, CD19 positive, CD23 positive partially expressing CD11c and CD25 and CD5 positive and negative for CD 79 and CD38  He was treated with single dose of chlorambucil to control his CLL   3/22 second dose of chlorambucil, also Rituxan 375 mg/M ² autoimmune hemolytic anemia  WBC continued to rise, 266,000  Patient was initiated with Venetoclax 20 mg daily  Tumor lysis monitored every 8 hours; given aggressive hydration and Lasix and remained euvolemic   Later venetoclax was changed to ibrutinib because of disease progression  Dec 2017- Elex Niya decreased to 140 mg PO daily due to thrombocytopenia   Selene Spray added   4/23 - 4/27/18 patient was admitted due to listeria bacteremia  He was discharged on IV ampicillin  Echo negative for vegetations     He is doing very well on present treatment plan suppressed CLL is concerned                         Current Outpatient Medications:   •  albuterol (2 5 mg/3 mL) 0 083 % nebulizer solution, Take 3 mL (2 5 mg total) by nebulization every 6 (six) hours as needed for wheezing or shortness of breath, Disp: 180 mL, Rfl: 5  •  albuterol (Ventolin HFA) 90 mcg/act inhaler, Inhale 2 puffs every 6 (six) hours as needed for wheezing, Disp: 54 g, Rfl: 1  •  amLODIPine (NORVASC) 10 mg tablet, Take 1 tablet (10 mg total) by mouth daily, Disp: 90 tablet, Rfl: 1  •  aspirin 81 MG tablet, Take 81 mg by mouth daily Every other day, Disp: , Rfl:   •  citalopram (CeleXA) 40 mg tablet, TAKE 1 TABLET DAILY, Disp: 90 tablet, Rfl: 1  •  Continuous Blood Gluc  (FreeStyle Ashley 2 [employer]) JOE, Use to scan sensor premeals and at bedtime, Disp: 1 each, Rfl: 1  •  Continuous Blood Gluc Sensor (FreeStyle Ashley 2 Sensor) MISC, Apply 1 sensor every 14 days  Use as directed with Freestyle Ashley 2 reader , Disp: 2 each, Rfl: 2  •  fenofibrate (TRICOR) 145 mg tablet, TAKE 1 TABLET DAILY, Disp: 90 tablet, Rfl: 5  •  fluticasone (FLONASE) 50 mcg/act nasal spray, 2 sprays into each nostril daily, Disp: 16 g, Rfl: 0  •  folic acid (FOLVITE) 1 mg tablet, Take 800 mcg by mouth daily , Disp: , Rfl:   •  gabapentin (NEURONTIN) 600 MG tablet, TAKE 1 TABLET DAILY, Disp: 90 tablet, Rfl: 3  •  glucose blood (FreeStyle Precision Sujit Test) test strip, Use 1 each 3 (three) times a day, Disp: 50 each, Rfl: 0  •  glucose monitoring kit (FREESTYLE) monitoring kit, 1 each by Does not apply route as needed ( ) E 11 9, Disp: 1 each, Rfl: 0  •  Ibrutinib 140 MG TABS, Take 140 mg by mouth daily, Disp: 30 tablet, Rfl: 6  •  insulin degludec Everardo Bruch FlexTouch) 100 units/mL injection pen, Inject 18 Units under the skin daily at bedtime, Disp: 15 mL, Rfl: 3  •  insulin lispro (Admelog SoloStar) 100 units/mL injection pen, Inject 5-17 Units under the skin 3 (three) times a day with meals, Disp: 15 mL, Rfl: 5  •  Insulin Pen Needle (BD Pen Needle Inez U/F) 32G X 4 MM MISC, Use 3 (three) times a day, Disp: 300 each, Rfl: 0  •  Lancets (FREESTYLE) lancets, Use as instructed--test 2 times a day  E 11 9, Disp: 100 each, Rfl: 0  •  LORazepam (ATIVAN) 0 5 mg tablet, Take 1 tablet by mouth daily as needed for anxiety, Disp: 30 tablet, Rfl: 0  •  losartan (COZAAR) 100 MG tablet, TAKE 1 TABLET DAILY, Disp: 90 tablet, Rfl: 5  •  mometasone-formoterol (Dulera) 200-5 MCG/ACT inhaler, Inhale 2 puffs 2 (two) times a day Rinse mouth after use , Disp: 13 g, Rfl: 1  •  multivitamin (THERAGRAN) TABS, Take 1 tablet by mouth daily, Disp: , Rfl:   •  naloxone (NARCAN) 4 mg/0 1 mL nasal spray, Administer 1 spray into a nostril   If no response after 2-3 minutes, give another dose in the other nostril using a new spray , Disp: 1 each, Rfl: 1  •  obinutuzumab (GAZYVA) 1000 mg/40 mL SOLN, Infuse into a venous catheter, Disp: , Rfl:   •  oxyCODONE (ROXICODONE) 10 MG TABS, Take 1 tablet (10 mg total) by mouth every 6 (six) hours as needed for moderate pain For ongoing pain control Max Daily Amount: 40 mg, Disp: 28 tablet, Rfl: 0  •  pantoprazole (PROTONIX) 40 mg tablet, Take 1 tablet (40 mg total) by mouth 2 (two) times a day, Disp: 180 tablet, Rfl: 3  •  predniSONE 5 mg tablet, TAKE 1 TABLET DAILY, Disp: 90 tablet, Rfl: 5  •  zolpidem (AMBIEN) 5 mg tablet, Take 1 tablet (5 mg total) by mouth daily at bedtime as needed for sleep, Disp: 30 tablet, Rfl: 0  •  acyclovir (ZOVIRAX) 400 MG tablet, TAKE 1 TABLET TWICE A DAY (Patient not taking: Reported on 5/4/2023), Disp: 60 tablet, Rfl: 11  •  benzonatate (TESSALON) 200 MG capsule, TAKE 1 CAPSULE THREE TIMES A DAY AS NEEDED FOR COUGH (Patient not taking: Reported on 5/4/2023), Disp: 20 capsule, Rfl: 51  •  sodium chloride, PF, 0 9 %, 10 mL by Intracatheter route see administration instructions 10mL into port before and after ampicillin doses (Patient not taking: Reported on 5/4/2023), Disp: , Rfl: 0    Allergies   Allergen Reactions   • Heparin Other (See Comments)     Other reaction(s): Blood Disorders  clot   • Macrolides And Ketolides Other (See Comments)     Interacts with other meds he is taking   • Simvastatin Myalgia       Oncology History   CLL (chronic lymphocytic leukemia) ([facility])   8/22/2017 -  Chemotherapy    chlorambucil (LEUKERAN), 0 5 mg/kg, Oral, Every 14 days, 6 of 6 cycles  obinutuzumab (GAZYVA) day 1 IVPB, 100 mg, Intravenous, Once, 1 of 1 cycle  obinutuzumab (GAZYVA) day 2 titrated infusion, 900 mg, Intravenous, Once, 1 of 1 cycle  obinutuzumab (GAZYVA) subsequent titrated infusion, 1,000 mg, Intravenous, Once, 57 of 59 cycles  Administration: 1,000 mg (5/21/2019), 1,000 mg (6/18/2019), 1,000 mg (7/16/2019), 1,000 mg (8/13/2019), 1,000 mg (9/10/2019), 1,000 mg "(10/8/2019), 1,000 mg (11/5/2019), 1,000 mg (12/3/2019), 1,000 mg (12/31/2019), 1,000 mg (1/28/2020), 1,000 mg (2/25/2020), 1,000 mg (3/24/2020), 1,000 mg (4/21/2020), 1,000 mg (5/19/2020), 1,000 mg (6/16/2020), 1,000 mg (7/14/2020), 1,000 mg (8/11/2020), 1,000 mg (9/9/2020), 1,000 mg (10/6/2020), 1,000 mg (11/3/2020), 1,000 mg (12/1/2020), 1,000 mg (1/26/2021), 1,000 mg (12/29/2020), 1,000 mg (2/23/2021), 1,000 mg (3/23/2021), 1,000 mg (4/20/2021), 1,000 mg (5/18/2021), 1,000 mg (6/15/2021), 1,000 mg (7/13/2021), 1,000 mg (8/10/2021), 1,000 mg (9/7/2021), 1,000 mg (10/5/2021), 1,000 mg (11/2/2021), 1,000 mg (11/30/2021), 1,000 mg (12/28/2021), 1,000 mg (1/25/2022), 1,000 mg (2/22/2022), 1,000 mg (3/22/2022), 1,000 mg (5/17/2022), 1,000 mg (6/14/2022), 1,000 mg (7/12/2022), 1,000 mg (8/9/2022), 1,000 mg (9/6/2022), 1,000 mg (10/4/2022), 1,000 mg (11/1/2022), 1,000 mg (11/29/2022), 1,000 mg (1/24/2023), 1,000 mg (2/21/2023), 1,000 mg (3/21/2023), 1,000 mg (4/18/2023), 1,000 mg (5/16/2023)     4/24/2018 Initial Diagnosis    [facility]ch[facility] )         ROS:  05/31/23 Reviewed 13 systems: See symptoms in HPI  Presently no other neurological, cardiac, pulmonary, GI and  symptoms other than listed in HPI  Other symptoms are in HPI  No other symptoms like fever, chills, bleeding, bone pains, skin rash, weight loss,  numbness,  claudication and gait problem  No frequent infections but had them before  Not unusually sensitive to heat or cold  No swelling of the ankles  No swollen glands  Patient is anxious  /68 (BP Location: Left arm)   Pulse 83   Temp 98 1 °F (36 7 °C) (Tympanic)   Resp 18   Ht 6' 0 99\" (1 854 m)   Wt 107 kg (235 lb)   SpO2 96%   BMI 31 01 kg/m²     Physical Exam:  Patient is alert and oriented  Patient is not in distress  Vitals are above  There is no icterus  There is no palpable neck mass    Lungs clear clear to percussion and auscultation except for few " scattered rhonchi  Heart rate is regular  There is no palpable abdominal mass  Abdomen is soft and nontender  There is no ascites  There is no edema of the ankles  There is no calf tenderness  There is no focal neurological deficit  There is no skin rash  There is vascular purpura on forearms  Lymph nodes are not palpably enlarged in the neck and axillary areas  No clubbing  Patient is anxious  Performance status 2        IMAGING:      LABS:    Results for orders placed or performed during the hospital encounter of 05/16/23   CBC and differential   Result Value Ref Range    WBC 4 79 4 31 - 10 16 Thousand/uL    RBC 4 58 3 88 - 5 62 Million/uL    Hemoglobin 14 2 12 0 - 17 0 g/dL    Hematocrit 44 1 36 5 - 49 3 %    MCV 96 82 - 98 fL    MCH 31 0 26 8 - 34 3 pg    MCHC 32 2 31 4 - 37 4 g/dL    RDW 13 6 11 6 - 15 1 %    MPV 11 5 8 9 - 12 7 fL    Platelets 936 152 - 087 Thousands/uL   Comprehensive metabolic panel   Result Value Ref Range    Sodium 139 135 - 147 mmol/L    Potassium 3 8 3 5 - 5 3 mmol/L    Chloride 108 96 - 108 mmol/L    CO2 26 21 - 32 mmol/L    ANION GAP 5 4 - 13 mmol/L    BUN 22 5 - 25 mg/dL    Creatinine 1 08 0 60 - 1 30 mg/dL    Glucose 150 (H) 65 - 140 mg/dL    Calcium 9 3 8 4 - 10 2 mg/dL    AST 29 13 - 39 U/L    ALT 34 7 - 52 U/L    Alkaline Phosphatase 49 34 - 104 U/L    Total Protein 6 2 (L) 6 4 - 8 4 g/dL    Albumin 4 2 3 5 - 5 0 g/dL    Total Bilirubin 0 49 0 20 - 1 00 mg/dL    eGFR 70 ml/min/1 73sq m   IgG   Result Value Ref Range     0 (L) 700 0 - 1,600 0 mg/dL   Retic Count   Result Value Ref Range    Retic Ct Abs 80,600 14,356 - 105,094    Retic Ct Pct 1 76 0 37 - 1 87 %   Manual Differential(PHLEBS Do Not Order)   Result Value Ref Range    Segmented % 61 43 - 75 %    Bands % 1 0 - 8 %    Lymphocytes % 19 14 - 44 %    Monocytes % 16 (H) 4 - 12 %    Eosinophils, % 3 0 - 6 %    Basophils % 0 0 - 1 %    Absolute Neutrophils 2 97 1 85 - 7 62 Thousand/uL    Lymphocytes Absolute 0  91 0 60 - 4 47 Thousand/uL    Monocytes Absolute 0 77 0 00 - 1 22 Thousand/uL    Eosinophils Absolute 0 14 0 00 - 0 40 Thousand/uL    Basophils Absolute 0 00 0 00 - 0 10 Thousand/uL    Total Counted      RBC Morphology Normal     Platelet Estimate Adequate Adequate     *Note: Due to a large number of results and/or encounters for the requested time period, some results have not been displayed  A complete set of results can be found in Results Review      Labs, Imaging, & Other studies:   All pertinent labs and imaging studies were personally reviewed      Reviewed test results and discussed with patient     Assessment and plan:  Patient is on low-dose ibrutinib 140 mg daily, Gazyva once a month and IVIG once a month  Also he is on 5 mg prednisone daily and folic acid  Patient is being treated for  CLL with history of profound anemia, thrombocytopenia and low IgG level  CLL was diagnosed more than 20 years ago and he had multiple lines of therapies and at 1 time his hemoglobin was down to 4 9 because of  autoimmune hemolytic anemia and CLL  He also has history of heparin-induced thrombocytopenia and he knows that  He has tiredness, chronic nonproductive cough, exertional dyspnea, occasionally wheezing, arthritic symptoms and for that he is on oxycodone  He has  vascular purpura on forearms  He has chronic lung disease  History of diabetes mellitus, hypertension and coronary artery disease     For dysphagia he had EGD and dilatation in September 2020 and he follows with his gastroenterologist         He is taking vitamin-D and calcium and tries to stay active to prevent worsening of 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    [facility] has coronary artery disease and has 1 stent and he takes 81 mg aspirin daily with food     CLL was diagnosed several years ago  He had been through Fludara based chemotherapy and pentostatin based chemotherapy  He had increased hemolysis when he was on chemotherapy   His most recent chemotherapy treatment was Cytoxan, Oncovin, prednisone and Rituxan and last treatment was in December 2012    In 2013 he was started on Rituxan, prednisone and folic acid because he started to [facility] again  IVIG was tried unsuccessfully so far as hemolysis is concerned  Rituxan has controlled the hemolysis    Information on the treatments from March 2017 onwards  On 3/20/17 patient found to have hemoglobin of 6 2, ,000  He was admitted to [place] [address] for blood transfusion and plan to transfer to [address] [place] [address]  On 3/20/17 WBC count 195,000, hemoglobin 4 9, platelet count 65  Direct coomb's was positive with undetectable haptoglobin  He was given 2 units of PRBCs, Solu-Medrol 1 g ×3 days and IVIG 1 g/kg daily ×3 days  His hemoglobin continued to drop  Bone marrow biopsy on 3/21 showed marrow cellularity of greater than 95% almost replaced by small lymphocytes, lambda light chain restricted, CD20 positive, CD19 positive, CD23 positive partially expressing CD11c and CD25 and CD5 positive and negative for CD 79 and CD38  He was treated with single dose of chlorambucil to control his CLL   3/22 second dose of chlorambucil, also Rituxan 375 mg/M ² autoimmune hemolytic anemia  WBC continued to rise, 266,000  Patient was initiated with Venetoclax 20 mg daily  Tumor lysis monitored every 8 hours; given aggressive hydration and Lasix and remained euvolemic   Later venetoclax was changed to ibrutinib because of disease progression  Dec 2017- Deronda Pill decreased to 140 mg PO daily due to thrombocytopenia   Dub Journey added   4/23 - 4/27/18 patient was admitted due to listeria bacteremia  He was discharged on IV ampicillin  Echo negative for vegetations  He is doing very well on present treatment plan suppressed CLL is concerned        Physical examination and test results are as recorded and discussed in very much detail  Hematologically stable    No change in therapy  All discussed in detail  Questions answered  Renewed oxycodone  Also prescribed Narcan  Discussed the importance of eating healthy foods, staying active and health screening tests  Patient to avoid trauma and falls  Goal is prolongation of survival and prevention of complications  Patient is capable of self-care  Discussed precautions against coronavirus and other infections  Patient is immunocompromised   Gucci Guaman See diagnoses, orders and instructions  1  CLL (chronic lymphocytic leukemia) (HCC)    - CBC and differential; Standing  - Comprehensive metabolic panel; Standing  - IgG; Standing  - CBC and differential  - Comprehensive metabolic panel  - IgG    2  Autoimmune hemolytic anemia ([facility])      3  Thrombocytopenia ([facility] )      4  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[facility])      5  Hypogammaglobulinemia, acquired ([place] [address] )      6  Cancer related pain    - oxyCODONE (ROXICODONE) 10 MG TABS; Take 1 tablet (10 mg total) by mouth every 6 (six) hours as needed for moderate pain For ongoing pain control Max Daily Amount: 40 mg  Dispense: 28 tablet; Refill: 0  - naloxone (NARCAN) 4 mg/0 1 mL nasal spray; Administer 1 spray into a nostril  If no response after 2-3 minutes, give another dose in the other nostril using a new spray  Dispense: 1 each; Refill: 1    7  Chronic obstructive pulmonary disease, unspecified COPD type ([place] [address] )      8  Type 2 diabetes mellitus with diabetic polyneuropathy, with long-term current use of insulin ([place] [address] )      9  Coronary artery disease involving native coronary artery of native heart without angina pectoris      10  Primary hypertension      11  Narcotic dependence (HCC)    - naloxone (NARCAN) 4 mg/0 1 mL nasal spray; Administer 1 spray into a nostril  If no response after 2-3 minutes, give another dose in the other nostril using a new spray  Dispense: 1 each;  Refill: 1                  Patient will continue to follow with his primary physician and other consultants      I used dictation device to dictate this note and there could be mistakes in my note and for that he may call my office      Patient voiced understanding and agreed      Counseling / Coordination of Care      Provided counseling and support

## 2023-05-31 NOTE — PATIENT INSTRUCTIONS
Resume oxycodone  Prescribed Narcan  No change in ibrutinib, Gazyva and prednisone  Follow-up in 3 months

## 2023-06-01 ENCOUNTER — HOSPITAL ENCOUNTER (OUTPATIENT)
Dept: INFUSION CENTER | Facility: CLINIC | Age: 68
Discharge: HOME | End: 2023-06-01
Payer: MEDICARE

## 2023-06-01 VITALS
DIASTOLIC BLOOD PRESSURE: 79 MMHG | BODY MASS INDEX: 31.13 KG/M2 | SYSTOLIC BLOOD PRESSURE: 166 MMHG | WEIGHT: 235.89 LBS | HEART RATE: 77 BPM | TEMPERATURE: 97.8 F | RESPIRATION RATE: 18 BRPM

## 2023-06-01 DIAGNOSIS — D80.1 HYPOGAMMAGLOBULINEMIA, ACQUIRED (HCC): ICD-10-CM

## 2023-06-01 DIAGNOSIS — C91.10 CLL (CHRONIC LYMPHOCYTIC LEUKEMIA) (HCC): Primary | ICD-10-CM

## 2023-06-01 PROCEDURE — 96366 THER/PROPH/DIAG IV INF ADDON: CPT

## 2023-06-01 PROCEDURE — 96375 TX/PRO/DX INJ NEW DRUG ADDON: CPT

## 2023-06-01 PROCEDURE — 96367 TX/PROPH/DG ADDL SEQ IV INF: CPT

## 2023-06-01 PROCEDURE — 96365 THER/PROPH/DIAG IV INF INIT: CPT

## 2023-06-01 RX ORDER — ACETAMINOPHEN 325 MG/1
650 TABLET ORAL ONCE
Status: COMPLETED | OUTPATIENT
Start: 2023-06-01 | End: 2023-06-01

## 2023-06-01 RX ORDER — SODIUM CHLORIDE 9 MG/ML
20 INJECTION, SOLUTION INTRAVENOUS ONCE
Status: COMPLETED | OUTPATIENT
Start: 2023-06-01 | End: 2023-06-01

## 2023-06-01 RX ORDER — SODIUM CHLORIDE 9 MG/ML
20 INJECTION, SOLUTION INTRAVENOUS ONCE
OUTPATIENT
Start: 2023-06-27

## 2023-06-01 RX ORDER — ACETAMINOPHEN 325 MG/1
650 TABLET ORAL ONCE
OUTPATIENT
Start: 2023-06-27

## 2023-06-01 RX ADMIN — HYDROCORTISONE SODIUM SUCCINATE 100 MG: 100 INJECTION, POWDER, FOR SOLUTION INTRAMUSCULAR; INTRAVENOUS at 08:53

## 2023-06-01 RX ADMIN — ACETAMINOPHEN 650 MG: 325 TABLET ORAL at 08:52

## 2023-06-01 RX ADMIN — SODIUM CHLORIDE 20 ML/HR: 0.9 INJECTION, SOLUTION INTRAVENOUS at 08:50

## 2023-06-01 RX ADMIN — DIPHENHYDRAMINE HYDROCHLORIDE 12.5 MG: 50 INJECTION, SOLUTION INTRAMUSCULAR; INTRAVENOUS at 08:50

## 2023-06-01 RX ADMIN — Medication 20 G: at 09:36

## 2023-06-07 DIAGNOSIS — C91.10 CLL (CHRONIC LYMPHOCYTIC LEUKEMIA) (HCC): Primary | ICD-10-CM

## 2023-06-08 RX ORDER — ACETAMINOPHEN 325 MG/1
650 TABLET ORAL ONCE
Status: CANCELLED | OUTPATIENT
Start: 2023-06-13

## 2023-06-08 RX ORDER — SODIUM CHLORIDE 9 MG/ML
20 INJECTION, SOLUTION INTRAVENOUS ONCE
Status: CANCELLED | OUTPATIENT
Start: 2023-06-13

## 2023-06-09 ENCOUNTER — OFFICE VISIT (OUTPATIENT)
Dept: URGENT CARE | Facility: CLINIC | Age: 68
End: 2023-06-09
Payer: MEDICARE

## 2023-06-09 ENCOUNTER — APPOINTMENT (OUTPATIENT)
Dept: RADIOLOGY | Facility: CLINIC | Age: 68
End: 2023-06-09
Payer: MEDICARE

## 2023-06-09 VITALS
RESPIRATION RATE: 20 BRPM | OXYGEN SATURATION: 96 % | HEART RATE: 94 BPM | TEMPERATURE: 97.6 F | DIASTOLIC BLOOD PRESSURE: 80 MMHG | SYSTOLIC BLOOD PRESSURE: 140 MMHG

## 2023-06-09 DIAGNOSIS — M25.562 ARTHRALGIA OF KNEE, LEFT: Primary | ICD-10-CM

## 2023-06-09 DIAGNOSIS — M25.562 ARTHRALGIA OF KNEE, LEFT: ICD-10-CM

## 2023-06-09 PROCEDURE — G0463 HOSPITAL OUTPT CLINIC VISIT: HCPCS | Performed by: PHYSICIAN ASSISTANT

## 2023-06-09 PROCEDURE — 73564 X-RAY EXAM KNEE 4 OR MORE: CPT

## 2023-06-09 PROCEDURE — 99213 OFFICE O/P EST LOW 20 MIN: CPT | Performed by: PHYSICIAN ASSISTANT

## 2023-06-09 NOTE — PATIENT INSTRUCTIONS
May continue the knee compression wrap  Recommend cane or crutch to ambulate to help offload pressure on knee  Diclofenac gel as instructed to help with knee pain  If diclofenac gel does not help, may do warm and/or cold compresses over knee for comfort  10 to 15 minutes every couple hours while awake  May also take Tylenol  In light of your prior left knee surgery, it would be appropriate to make referral to orthopedist     Cortez Summers may call the orthopedic scheduling office at [phone] to inquire about scheduling an appointment

## 2023-06-09 NOTE — PROGRESS NOTES
[facility]    NAME: Andrew Tolbert is a 75 y o  male  : 1955    MRN: [id number]  DATE: 2023  TIME: 12:25 PM    Assessment and Plan   Arthralgia of knee, left [M25 [id number]]  1  Arthralgia of knee, left  Diclofenac Sodium (VOLTAREN) 1 %    Ambulatory referral to Orthopedic Surgery    CANCELED: XR knee 3 vw left non injury        X-ray left knee: Narrowing of medial joint space  Some degenerative changes noted on patella region  No acute bony changes  Await radiologist final test results  Patient Instructions   Patient Instructions   May continue the knee compression wrap  Recommend cane or crutch to ambulate to help offload pressure on knee  Diclofenac gel as instructed to help with knee pain  If diclofenac gel does not help, may do warm and/or cold compresses over knee for comfort  10 to 15 minutes every couple hours while awake  May also take Tylenol  In light of your prior left knee surgery, it would be appropriate to make referral to orthopedist     Enma Sullivan may call the orthopedic scheduling office at [phone] to inquire about scheduling an appointment  Chief Complaint     Chief Complaint   Patient presents with   • Knee Pain     Pt C/O non-injury related left knee pain that started about a week ago  History of Present Illness   Andrew Tolbert presents to the clinic c/o  59-year-old male comes in with left knee pain  Started: About a week ago without any known specific injury or irritation  Associated signs and symptoms: Pain to bend knee  Feels like something is catching  Medial aspect  Modifying factors: Wearing knee compression wrap  Has not done any ice or heat  Took ibuprofen once but is not supposed to take ibuprofen due to his medical history  History of medial meniscus removal about 7 to 9 years ago due to shredded meniscus  Says it feels kind of similar  Pt has hx CLL        Review of Systems   Review of Systems   Constitutional: Positive for activity change  Negative for appetite change, chills and fever  Musculoskeletal: Positive for arthralgias and gait problem  Negative for joint swelling  Skin: Negative for color change  Current Medications     Long-Term Medications   Medication Sig Dispense Refill   • amLODIPine (NORVASC) 10 mg tablet Take 1 tablet (10 mg total) by mouth daily 90 tablet 1   • citalopram (CeleXA) 40 mg tablet TAKE 1 TABLET DAILY 90 tablet 1   • Diclofenac Sodium (VOLTAREN) 1 % Apply 4 g topically 4 (four) times a day 100 g 0   • fenofibrate (TRICOR) 145 mg tablet TAKE 1 TABLET DAILY 90 tablet 5   • fluticasone (FLONASE) 50 mcg/act nasal spray 2 sprays into each nostril daily 16 g 0   • gabapentin (NEURONTIN) 600 MG tablet TAKE 1 TABLET DAILY 90 tablet 3   • glucose monitoring kit (FREESTYLE) monitoring kit 1 each by Does not apply route as needed ( ) E 11 9 1 each 0   • Ibrutinib 140 MG TABS Take 140 mg by mouth daily 30 tablet 6   • insulin degludec Interiano Perking FlexTouch) 100 units/mL injection pen Inject 18 Units under the skin daily at bedtime 15 mL 3   • insulin lispro (Admelog SoloStar) 100 units/mL injection pen Inject 5-17 Units under the skin 3 (three) times a day with meals 15 mL 5   • Insulin Pen Needle (BD Pen Needle Inez U/F) 32G X 4 MM MISC Use 3 (three) times a day 300 each 0   • Lancets (FREESTYLE) lancets Use as instructed--test 2 times a day    E 11 9 100 each 0   • LORazepam (ATIVAN) 0 5 mg tablet Take 1 tablet by mouth daily as needed for anxiety 30 tablet 0   • losartan (COZAAR) 100 MG tablet TAKE 1 TABLET DAILY 90 tablet 5   • mometasone-formoterol (Dulera) 200-5 MCG/ACT inhaler Inhale 2 puffs 2 (two) times a day Rinse mouth after use  13 g 1   • multivitamin (THERAGRAN) TABS Take 1 tablet by mouth daily     • naloxone (NARCAN) 4 mg/0 1 mL nasal spray Administer 1 spray into a nostril  If no response after 2-3 minutes, give another dose in the other nostril using a new spray   1 each 1   • naloxone (NARCAN) 4 mg/0 1 mL nasal spray Administer 1 spray into a nostril  If no response after 2-3 minutes, give another dose in the other nostril using a new spray   1 each 1   • obinutuzumab (GAZYVA) 1000 mg/40 mL SOLN Infuse into a venous catheter     • pantoprazole (PROTONIX) 40 mg tablet Take 1 tablet (40 mg total) by mouth 2 (two) times a day 180 tablet 3   • zolpidem (AMBIEN) 5 mg tablet Take 1 tablet (5 mg total) by mouth daily at bedtime as needed for sleep 30 tablet 0   • acyclovir (ZOVIRAX) 400 MG tablet TAKE 1 TABLET TWICE A DAY (Patient not taking: Reported on 5/4/2023) 60 tablet 11   • sodium chloride, PF, 0 9 % 10 mL by Intracatheter route see administration instructions 10mL into port before and after ampicillin doses (Patient not taking: Reported on 5/4/2023)  0       Current Allergies     Allergies as of 06/09/2023 - Reviewed 06/09/2023   Allergen Reaction Noted   • Heparin Other (See Comments) 06/06/2012   • Macrolides and ketolides Other (See Comments) 01/13/2016   • Simvastatin Myalgia 12/17/2021          The following portions of the patient's history were reviewed and updated as appropriate: allergies, current medications, past family history, past medical history, past social history, past surgical history and problem list   Past Medical History:   Diagnosis Date   • Allergic    • Anemia    • Arthritis    • CAD (coronary artery disease) 2004   • Cancer ([facility] [address] )     Leukemia   • Cellulitis of scalp    • CKD (chronic kidney disease) stage 3, GFR 30-59 ml/min ([facility])    • CLL (chronic lymphocytic leukemia) ([address] )    • COPD (chronic obstructive pulmonary disease) ([address] )    • Diabetes mellitus ([address] )     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) 2004    acute   • Neutropenia ([facility] [address] )    • Obese    • Recurrent sinusitis    • Thrombocytopenia ([facility] [address] )    • Vertigo      Past Surgical History:   Procedure Laterality Date   • ARTHROSCOPIC REPAIR ACL      lt knee   • CARDIAC SURGERY      cardiac cath with stent   • FOOT SURGERY     • IR PORT CHECK  5/17/2019   • KNEE ARTHROSCOPY     • OTHER SURGICAL HISTORY      cardiac cath lesion 1, 1st adjunct treat device: stent   • PORTACATH PLACEMENT     • TN ESOPHAGOGASTRODUODENOSCOPY TRANSORAL DIAGNOSTIC N/A 10/5/2017    Procedure: ESOPHAGOGASTRODUODENOSCOPY (EGD) with bx;  Surgeon: Fina King DO;  [facility]: [facility]; Service: Gastroenterology   • TN ESOPHAGOGASTRODUODENOSCOPY TRANSORAL DIAGNOSTIC N/A 3/8/2018    Procedure: ESOPHAGOGASTRODUODENOSCOPY (EGD) with biopsy;  Surgeon: Fina King DO;  [facility]: [facility]; Service: Gastroenterology   • TN ESOPHAGOGASTRODUODENOSCOPY TRANSORAL DIAGNOSTIC N/A 6/20/2018    Procedure: ESOPHAGOGASTRODUODENOSCOPY (EGD) with Dilation;  Surgeon: Fina King DO;  [facility]: [facility]; Service: Gastroenterology   • TN ESOPHAGOGASTRODUODENOSCOPY TRANSORAL DIAGNOSTIC N/A 10/18/2018    Procedure: ESOPHAGOGASTRODUODENOSCOPY (EGD) with dilation;  Surgeon: Sulema Jasso MD;  [facility]: [facility]; Service: Gastroenterology   • TN ESOPHAGOSCOPY FLEXIBLE TRANSORAL WITH BIOPSY N/A 9/2/2016    Procedure: ESOPHAGOGASTRODUODENOSCOPY (EGD); ESOPHAGEAL DILATION; ESOPHAGEAL BIOPSY;  Surgeon: Filomena Ayala MD;  [facility];  Service: Thoracic   • TONSILLECTOMY     • UPPER GASTROINTESTINAL ENDOSCOPY      x 6     Family History   Problem Relation Age of Onset   • Leukemia Mother         chronic   • Colon polyps Mother         sidmoid   • Parkinsonism Father        Objective   /80 (BP Location: Right arm, Patient Position: Sitting, Cuff Size: Large)   Pulse 94   Temp 97 6 °F (36 4 °C) (Tympanic)   Resp 20   SpO2 96%   No LMP for male patient  Physical Exam     Physical Exam  Vitals and nursing note reviewed  Constitutional:       General: He is not in acute distress  Appearance: He is well-developed  He is not ill-appearing, toxic-appearing or diaphoretic  Comments: Antalgic gait   Cardiovascular:      Rate and Rhythm: Normal rate and regular rhythm  Pulmonary:      Effort: Pulmonary effort is normal    Musculoskeletal:         General: Tenderness present  No swelling, deformity or signs of injury  Left knee: Bony tenderness present  No swelling, effusion, erythema or ecchymosis  Decreased range of motion  Tenderness present over the medial joint line and MCL  No lateral joint line, LCL or patellar tendon tenderness  Right lower leg: No edema  Left lower leg: No edema  Comments: Significant limited range of motion left knee with flexion  Skin:     General: Skin is warm and dry  Findings: No erythema  Comments: No redness heat left knee versus right  Neurological:      General: No focal deficit present  Mental Status: He is alert and oriented to person, place, and time     Psychiatric:         Mood and Affect: Mood normal          Behavior: Behavior normal

## 2023-06-13 ENCOUNTER — HOSPITAL ENCOUNTER (OUTPATIENT)
Dept: INFUSION CENTER | Facility: CLINIC | Age: 68
Discharge: HOME | End: 2023-06-13
Payer: MEDICARE

## 2023-06-13 VITALS
TEMPERATURE: 98 F | RESPIRATION RATE: 18 BRPM | SYSTOLIC BLOOD PRESSURE: 159 MMHG | HEIGHT: 73 IN | HEART RATE: 73 BPM | DIASTOLIC BLOOD PRESSURE: 83 MMHG | WEIGHT: 234.13 LBS | BODY MASS INDEX: 31.03 KG/M2

## 2023-06-13 DIAGNOSIS — C91.10 CLL (CHRONIC LYMPHOCYTIC LEUKEMIA) (HCC): Primary | ICD-10-CM

## 2023-06-13 LAB
ALBUMIN SERPL BCP-MCNC: 4.1 G/DL (ref 3.5–5)
ALP SERPL-CCNC: 43 U/L (ref 34–104)
ALT SERPL W P-5'-P-CCNC: 34 U/L (ref 7–52)
ANION GAP SERPL CALCULATED.3IONS-SCNC: 5 MMOL/L (ref 4–13)
AST SERPL W P-5'-P-CCNC: 28 U/L (ref 13–39)
BASOPHILS # BLD AUTO: 0.05 THOUSANDS/ÂΜL (ref 0–0.1)
BASOPHILS NFR BLD AUTO: 1 % (ref 0–1)
BILIRUB SERPL-MCNC: 0.48 MG/DL (ref 0.2–1)
BUN SERPL-MCNC: 22 MG/DL (ref 5–25)
CALCIUM SERPL-MCNC: 8.9 MG/DL (ref 8.4–10.2)
CHLORIDE SERPL-SCNC: 107 MMOL/L (ref 96–108)
CO2 SERPL-SCNC: 26 MMOL/L (ref 21–32)
CREAT SERPL-MCNC: 1.03 MG/DL (ref 0.6–1.3)
EOSINOPHIL # BLD AUTO: 0.23 THOUSAND/ÂΜL (ref 0–0.61)
EOSINOPHIL NFR BLD AUTO: 5 % (ref 0–6)
ERYTHROCYTE [DISTWIDTH] IN BLOOD BY AUTOMATED COUNT: 13.4 % (ref 11.6–15.1)
GFR SERPL CREATININE-BSD FRML MDRD: 74 ML/MIN/1.73SQ M
GLUCOSE SERPL-MCNC: 152 MG/DL (ref 65–140)
HCT VFR BLD AUTO: 42.9 % (ref 36.5–49.3)
HGB BLD-MCNC: 14 G/DL (ref 12–17)
IGG SERPL-MCNC: 537 MG/DL (ref 700–1600)
IMM GRANULOCYTES # BLD AUTO: 0.11 THOUSAND/UL (ref 0–0.2)
IMM GRANULOCYTES NFR BLD AUTO: 2 % (ref 0–2)
LYMPHOCYTES # BLD AUTO: 0.76 THOUSANDS/ÂΜL (ref 0.6–4.47)
LYMPHOCYTES NFR BLD AUTO: 16 % (ref 14–44)
MCH RBC QN AUTO: 31 PG (ref 26.8–34.3)
MCHC RBC AUTO-ENTMCNC: 32.6 G/DL (ref 31.4–37.4)
MCV RBC AUTO: 95 FL (ref 82–98)
MONOCYTES # BLD AUTO: 0.69 THOUSAND/ÂΜL (ref 0.17–1.22)
MONOCYTES NFR BLD AUTO: 14 % (ref 4–12)
NEUTROPHILS # BLD AUTO: 2.98 THOUSANDS/ÂΜL (ref 1.85–7.62)
NEUTS SEG NFR BLD AUTO: 62 % (ref 43–75)
NRBC BLD AUTO-RTO: 0 /100 WBCS
PLATELET # BLD AUTO: 169 THOUSANDS/UL (ref 149–390)
PMV BLD AUTO: 11.5 FL (ref 8.9–12.7)
POTASSIUM SERPL-SCNC: 3.9 MMOL/L (ref 3.5–5.3)
PROT SERPL-MCNC: 6.2 G/DL (ref 6.4–8.4)
RBC # BLD AUTO: 4.52 MILLION/UL (ref 3.88–5.62)
RETICS # AUTO: ABNORMAL 10*3/UL (ref 14356–105094)
RETICS # CALC: 2 % (ref 0.37–1.87)
SODIUM SERPL-SCNC: 138 MMOL/L (ref 135–147)
WBC # BLD AUTO: 4.82 THOUSAND/UL (ref 4.31–10.16)

## 2023-06-13 PROCEDURE — 80053 COMPREHEN METABOLIC PANEL: CPT

## 2023-06-13 PROCEDURE — 82784 ASSAY IGA/IGD/IGG/IGM EACH: CPT

## 2023-06-13 PROCEDURE — 85025 COMPLETE CBC W/AUTO DIFF WBC: CPT

## 2023-06-13 PROCEDURE — 85045 AUTOMATED RETICULOCYTE COUNT: CPT | Performed by: INTERNAL MEDICINE

## 2023-06-13 RX ORDER — SODIUM CHLORIDE 9 MG/ML
20 INJECTION, SOLUTION INTRAVENOUS ONCE
Status: COMPLETED | OUTPATIENT
Start: 2023-06-13 | End: 2023-06-13

## 2023-06-13 RX ORDER — ACETAMINOPHEN 325 MG/1
650 TABLET ORAL ONCE
Status: COMPLETED | OUTPATIENT
Start: 2023-06-13 | End: 2023-06-13

## 2023-06-13 RX ADMIN — ACETAMINOPHEN 650 MG: 325 TABLET ORAL at 08:29

## 2023-06-13 RX ADMIN — DEXAMETHASONE SODIUM PHOSPHATE 20 MG: 10 INJECTION, SOLUTION INTRAMUSCULAR; INTRAVENOUS at 08:29

## 2023-06-13 RX ADMIN — DIPHENHYDRAMINE HYDROCHLORIDE 25 MG: 50 INJECTION, SOLUTION INTRAMUSCULAR; INTRAVENOUS at 08:55

## 2023-06-13 RX ADMIN — OBINUTUZUMAB 1000 MG: 1000 INJECTION, SOLUTION, CONCENTRATE INTRAVENOUS at 10:06

## 2023-06-13 RX ADMIN — SODIUM CHLORIDE 20 ML/HR: 0.9 INJECTION, SOLUTION INTRAVENOUS at 08:29

## 2023-06-16 DIAGNOSIS — T38.0X5S STEROID-INDUCED DIABETES MELLITUS, SEQUELA (HCC): ICD-10-CM

## 2023-06-16 DIAGNOSIS — E09.9 STEROID-INDUCED DIABETES MELLITUS, SEQUELA (HCC): ICD-10-CM

## 2023-06-16 RX ORDER — INSULIN LISPRO 100 [IU]/ML
5-17 INJECTION, SOLUTION INTRAVENOUS; SUBCUTANEOUS
Qty: 15 ML | Refills: 0 | Status: SHIPPED | OUTPATIENT
Start: 2023-06-16

## 2023-06-23 ENCOUNTER — APPOINTMENT (OUTPATIENT)
Dept: RADIOLOGY | Facility: MEDICAL CENTER | Age: 68
End: 2023-06-23
Payer: MEDICARE

## 2023-06-23 ENCOUNTER — OFFICE VISIT (OUTPATIENT)
Dept: OBGYN CLINIC | Facility: MEDICAL CENTER | Age: 68
End: 2023-06-23
Payer: MEDICARE

## 2023-06-23 VITALS
SYSTOLIC BLOOD PRESSURE: 158 MMHG | DIASTOLIC BLOOD PRESSURE: 78 MMHG | HEIGHT: 73 IN | BODY MASS INDEX: 31.01 KG/M2 | WEIGHT: 234 LBS | HEART RATE: 76 BPM

## 2023-06-23 DIAGNOSIS — M17.12 PRIMARY OSTEOARTHRITIS OF LEFT KNEE: Primary | ICD-10-CM

## 2023-06-23 DIAGNOSIS — M70.52 PES ANSERINUS BURSITIS OF LEFT KNEE: ICD-10-CM

## 2023-06-23 DIAGNOSIS — M25.562 LEFT KNEE PAIN, UNSPECIFIED CHRONICITY: ICD-10-CM

## 2023-06-23 PROCEDURE — 73560 X-RAY EXAM OF KNEE 1 OR 2: CPT

## 2023-06-23 PROCEDURE — 99214 OFFICE O/P EST MOD 30 MIN: CPT | Performed by: ORTHOPAEDIC SURGERY

## 2023-06-23 NOTE — PROGRESS NOTES
Assessment/Plan     1  Primary osteoarthritis of left knee    2  Left knee pain, unspecified chronicity    3  Pes anserinus bursitis of left knee      Orders Placed This Encounter   Procedures   • XR knee 1 or 2 vw left     • Patient presents with moderate left knee osteoarthritis  • Discussed treatment as well as risks and benefits of these treatment options  • Patient declined PT script today and will continue activity as tolerated  • Continue Diclofenac gel as needed  • Continue Tylenol as needed for pain  1,000 mg up to 3 times a day  Do not exceed 3,000 mg per day  Limit NSAID use due to kidney disease  Return if symptoms worsen or fail to improve, for Recheck  I answered all of the patient's questions during the visit and provided education of the patient's condition during the visit  The patient verbalized understanding of the information given and agrees with the plan  This note was dictated using [employer] software  It may contain errors including improperly dictated words  Please contact physician directly for any questions  History of Present Illness   Chief complaint:   Chief Complaint   Patient presents with   • Left Knee - Pain       HPI: Aaron Salcido is a 75 y o  male that c/o left knee pain  Pain has been present for 1 month, located medial and anterior, and described as intermittent  Patient  Reports a meniscectomy 7 years ago with a [facility] provider  Pain is aggravated  by increased activity although he has not had any pain for a few days, and symptoms include cracking and swelling  Patient denies any radiating pain or distal paresthesias  Pain is alleviated by Diclofenac gel, and he cannot take NSAIDs due to CKD  Patient has initiated PT with no relief, and has not initiated injections, or bracing  ROS:    See HPI for musculoskeletal review     All other systems reviewed are negative     Historical Information   Past Medical History:   Diagnosis Date   • Allergic    • Anemia    • Arthritis    • CAD (coronary artery disease) 2004   • Cancer (HCC)     Leukemia   • Cellulitis of scalp    • CKD (chronic kidney disease) stage 3, GFR 30-59 ml/min (HCC)    • CLL (chronic lymphocytic leukemia) (HCC)    • COPD (chronic obstructive pulmonary disease) (HCC)    • Diabetes mellitus ([facility] [address] )     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   • Dyslipidemia    • Edema extremities    • Esophageal ulcer    • H/O blood clots    • Hemolytic anemia (HCC)    • Hiatal hernia    • History of TIA (transient ischemic attack)    • Hyperlipidemia    • Hypertension    • Listeria infection 2018   • Multiple gastric ulcers    • Myocardial infarction [facility]) 2004    acute   • Neutropenia (HCC)    • Obese    • Recurrent sinusitis    • Thrombocytopenia (HCC)    • Vertigo      Past Surgical History:   Procedure Laterality Date   • ARTHROSCOPIC REPAIR ACL      lt knee   • CARDIAC SURGERY      cardiac cath with stent   • FOOT SURGERY     • IR PORT CHECK  5/17/2019   • KNEE ARTHROSCOPY     • OTHER SURGICAL HISTORY      cardiac cath lesion 1, 1st adjunct treat device: stent   • PORTACATH PLACEMENT     • CA ESOPHAGOGASTRODUODENOSCOPY TRANSORAL DIAGNOSTIC N/A 10/5/2017    Procedure: ESOPHAGOGASTRODUODENOSCOPY (EGD) with bx;  Surgeon: Marlon Holland DO;  [facility]: [facility]; Service: Gastroenterology   • CA ESOPHAGOGASTRODUODENOSCOPY TRANSORAL DIAGNOSTIC N/A 3/8/2018    Procedure: ESOPHAGOGASTRODUODENOSCOPY (EGD) with biopsy;  Surgeon: Marlon Holland DO;  [facility]: [facility]; Service: Gastroenterology   • CA ESOPHAGOGASTRODUODENOSCOPY TRANSORAL DIAGNOSTIC N/A 6/20/2018    Procedure: ESOPHAGOGASTRODUODENOSCOPY (EGD) with Dilation;  Surgeon: Marlon Holland DO;  [facility]; Service: Gastroenterology   • CA ESOPHAGOGASTRODUODENOSCOPY TRANSORAL DIAGNOSTIC N/A 10/18/2018    Procedure: ESOPHAGOGASTRODUODENOSCOPY (EGD) with dilation;  Surgeon: Aubree Nelson MD;  [facility]: [facility];   Service: Gastroenterology   • CA ESOPHAGOSCOPY FLEXIBLE TRANSORAL WITH BIOPSY N/A 2016    Procedure: ESOPHAGOGASTRODUODENOSCOPY (EGD); ESOPHAGEAL DILATION; ESOPHAGEAL BIOPSY;  Surgeon: Candace Gerber MD;  [facility];  Service: Thoracic   • TONSILLECTOMY     • UPPER GASTROINTESTINAL ENDOSCOPY      x 6     Social History   Social History     Substance and Sexual Activity   Alcohol Use Yes   • Alcohol/week: 2 0 standard drinks of alcohol   • Types: 2 Cans of beer per week    Comment: social use as per [employer]     Social History     Substance and Sexual Activity   Drug Use Never     Social History     Tobacco Use   Smoking Status Former   • Packs/day: 2 00   • Years: 33 00   • Total pack years: 66 00   • Types: Cigarettes   • Start date:    • Quit date:    • Years since quittin 4   Smokeless Tobacco Never     Family History:   Family History   Problem Relation Age of Onset   • Leukemia Mother         chronic   • Colon polyps Mother         sidmoid   • Parkinsonism Father        Current Outpatient Medications on File Prior to Visit   Medication Sig Dispense Refill   • acyclovir (ZOVIRAX) 400 MG tablet TAKE 1 TABLET TWICE A DAY (Patient not taking: Reported on 2023) 60 tablet 11   • albuterol (2 5 mg/3 mL) 0 083 % nebulizer solution Take 3 mL (2 5 mg total) by nebulization every 6 (six) hours as needed for wheezing or shortness of breath (Patient not taking: Reported on 2023) 180 mL 5   • albuterol (Ventolin HFA) 90 mcg/act inhaler Inhale 2 puffs every 6 (six) hours as needed for wheezing (Patient not taking: Reported on 2023) 54 g 1   • amLODIPine (NORVASC) 10 mg tablet Take 1 tablet (10 mg total) by mouth daily 90 tablet 1   • aspirin 81 MG tablet Take 81 mg by mouth daily Every other day     • benzonatate (TESSALON) 200 MG capsule TAKE 1 CAPSULE THREE TIMES A DAY AS NEEDED FOR COUGH (Patient not taking: Reported on 2023) 20 capsule 51   • citalopram (CeleXA) 40 mg tablet TAKE 1 TABLET DAILY 90 tablet 1   • Continuous Blood Gluc  (FreeStyle Leija 2 [place]) JOE Use to scan sensor premeals and at bedtime 1 each 1   • Continuous Blood Gluc Sensor (FreeStyle Ashley 2 Sensor) MISC Apply 1 sensor every 14 days  Use as directed with Freestyle Ashley 2 reader  2 each 2   • Diclofenac Sodium (VOLTAREN) 1 % Apply 4 g topically 4 (four) times a day 100 g 0   • fenofibrate (TRICOR) 145 mg tablet TAKE 1 TABLET DAILY 90 tablet 5   • fluticasone (FLONASE) 50 mcg/act nasal spray 2 sprays into each nostril daily 16 g 0   • folic acid (FOLVITE) 1 mg tablet Take 800 mcg by mouth daily      • gabapentin (NEURONTIN) 600 MG tablet TAKE 1 TABLET DAILY 90 tablet 3   • glucose blood (FreeStyle Precision Sujit Test) test strip Use 1 each 3 (three) times a day 50 each 0   • glucose monitoring kit (FREESTYLE) monitoring kit 1 each by Does not apply route as needed ( ) E 11 9 1 each 0   • Ibrutinib 140 MG TABS Take 140 mg by mouth daily 30 tablet 6   • insulin degludec Abeba Colace FlexTouch) 100 units/mL injection pen Inject 18 Units under the skin daily at bedtime 15 mL 3   • insulin lispro (Admelog SoloStar) 100 units/mL injection pen Inject 5-17 Units under the skin 3 (three) times a day with meals 15 mL 0   • Insulin Pen Needle (BD Pen Needle Inez U/F) 32G X 4 MM MISC Use 3 (three) times a day 300 each 0   • Lancets (FREESTYLE) lancets Use as instructed--test 2 times a day    E 11 9 100 each 0   • LORazepam (ATIVAN) 0 5 mg tablet Take 1 tablet by mouth daily as needed for anxiety 30 tablet 0   • losartan (COZAAR) 100 MG tablet TAKE 1 TABLET DAILY 90 tablet 5   • mometasone-formoterol (Dulera) 200-5 MCG/ACT inhaler Inhale 2 puffs 2 (two) times a day Rinse mouth after use  13 g 1   • multivitamin (THERAGRAN) TABS Take 1 tablet by mouth daily     • naloxone (NARCAN) 4 mg/0 1 mL nasal spray Administer 1 spray into a nostril  If no response after 2-3 minutes, give another dose in the other nostril using a new spray   1 each 1   • naloxone (NARCAN) 4 mg/0 1 mL nasal spray Administer 1 spray into a nostril  If no response after 2-3 minutes, give another dose in the other nostril using a new spray  1 each 1   • obinutuzumab (GAZYVA) 1000 mg/40 mL SOLN Infuse into a venous catheter     • oxyCODONE (ROXICODONE) 10 MG TABS Take 1 tablet (10 mg total) by mouth every 6 (six) hours as needed for moderate pain For ongoing pain control Max Daily Amount: 40 mg 28 tablet 0   • pantoprazole (PROTONIX) 40 mg tablet Take 1 tablet (40 mg total) by mouth 2 (two) times a day 180 tablet 3   • predniSONE 5 mg tablet TAKE 1 TABLET DAILY 90 tablet 5   • sodium chloride, PF, 0 9 % 10 mL by Intracatheter route see administration instructions 10mL into port before and after ampicillin doses (Patient not taking: Reported on 5/4/2023)  0   • zolpidem (AMBIEN) 5 mg tablet Take 1 tablet (5 mg total) by mouth daily at bedtime as needed for sleep 30 tablet 0     No current facility-administered medications on file prior to visit       Allergies   Allergen Reactions   • Heparin Other (See Comments)     Other reaction(s): Blood Disorders  clot   • Macrolides And Ketolides Other (See Comments)     Interacts with other meds he is taking   • Simvastatin Myalgia       Current Outpatient Medications on File Prior to Visit   Medication Sig Dispense Refill   • acyclovir (ZOVIRAX) 400 MG tablet TAKE 1 TABLET TWICE A DAY (Patient not taking: Reported on 5/4/2023) 60 tablet 11   • albuterol (2 5 mg/3 mL) 0 083 % nebulizer solution Take 3 mL (2 5 mg total) by nebulization every 6 (six) hours as needed for wheezing or shortness of breath (Patient not taking: Reported on 6/9/2023) 180 mL 5   • albuterol (Ventolin HFA) 90 mcg/act inhaler Inhale 2 puffs every 6 (six) hours as needed for wheezing (Patient not taking: Reported on 6/9/2023) 54 g 1   • amLODIPine (NORVASC) 10 mg tablet Take 1 tablet (10 mg total) by mouth daily 90 tablet 1   • aspirin 81 MG tablet Take 81 mg by mouth daily Every other day     • benzonatate (TESSALON) 200 MG capsule TAKE 1 CAPSULE THREE TIMES A DAY AS NEEDED FOR COUGH (Patient not taking: Reported on 5/4/2023) 20 capsule 51   • citalopram (CeleXA) 40 mg tablet TAKE 1 TABLET DAILY 90 tablet 1   • Continuous Blood Gluc  (FreeStyle Ashley 2 [place]) JOE Use to scan sensor premeals and at bedtime 1 each 1   • Continuous Blood Gluc Sensor (FreeStyle Ashley 2 Sensor) MISC Apply 1 sensor every 14 days  Use as directed with Freestyle Ashley 2 reader  2 each 2   • Diclofenac Sodium (VOLTAREN) 1 % Apply 4 g topically 4 (four) times a day 100 g 0   • fenofibrate (TRICOR) 145 mg tablet TAKE 1 TABLET DAILY 90 tablet 5   • fluticasone (FLONASE) 50 mcg/act nasal spray 2 sprays into each nostril daily 16 g 0   • folic acid (FOLVITE) 1 mg tablet Take 800 mcg by mouth daily      • gabapentin (NEURONTIN) 600 MG tablet TAKE 1 TABLET DAILY 90 tablet 3   • glucose blood (FreeStyle Precision Sujit Test) test strip Use 1 each 3 (three) times a day 50 each 0   • glucose monitoring kit (FREESTYLE) monitoring kit 1 each by Does not apply route as needed ( ) E 11 9 1 each 0   • Ibrutinib 140 MG TABS Take 140 mg by mouth daily 30 tablet 6   • insulin degludec Ai Orange FlexTouch) 100 units/mL injection pen Inject 18 Units under the skin daily at bedtime 15 mL 3   • insulin lispro (Admelog SoloStar) 100 units/mL injection pen Inject 5-17 Units under the skin 3 (three) times a day with meals 15 mL 0   • Insulin Pen Needle (BD Pen Needle Inez U/F) 32G X 4 MM MISC Use 3 (three) times a day 300 each 0   • Lancets (FREESTYLE) lancets Use as instructed--test 2 times a day    E 11 9 100 each 0   • LORazepam (ATIVAN) 0 5 mg tablet Take 1 tablet by mouth daily as needed for anxiety 30 tablet 0   • losartan (COZAAR) 100 MG tablet TAKE 1 TABLET DAILY 90 tablet 5   • mometasone-formoterol (Dulera) 200-5 MCG/ACT inhaler Inhale 2 puffs 2 (two) times a day Rinse mouth after use   13 g 1   • multivitamin (THERAGRAN) TABS Take 1 tablet by mouth "daily     • naloxone (NARCAN) 4 mg/0 1 mL nasal spray Administer 1 spray into a nostril  If no response after 2-3 minutes, give another dose in the other nostril using a new spray  1 each 1   • naloxone (NARCAN) 4 mg/0 1 mL nasal spray Administer 1 spray into a nostril  If no response after 2-3 minutes, give another dose in the other nostril using a new spray  1 each 1   • obinutuzumab (GAZYVA) 1000 mg/40 mL SOLN Infuse into a venous catheter     • oxyCODONE (ROXICODONE) 10 MG TABS Take 1 tablet (10 mg total) by mouth every 6 (six) hours as needed for moderate pain For ongoing pain control Max Daily Amount: 40 mg 28 tablet 0   • pantoprazole (PROTONIX) 40 mg tablet Take 1 tablet (40 mg total) by mouth 2 (two) times a day 180 tablet 3   • predniSONE 5 mg tablet TAKE 1 TABLET DAILY 90 tablet 5   • sodium chloride, PF, 0 9 % 10 mL by Intracatheter route see administration instructions 10mL into port before and after ampicillin doses (Patient not taking: Reported on 5/4/2023)  0   • zolpidem (AMBIEN) 5 mg tablet Take 1 tablet (5 mg total) by mouth daily at bedtime as needed for sleep 30 tablet 0     No current facility-administered medications on file prior to visit  Objective   Vitals: Blood pressure 170/83, pulse 76, height 6' 0 99\" (1 854 m), weight 106 kg (234 lb)  ,Body mass index is 30 88 kg/m²      PE:  AAOx 3  WDWN  Hearing intact, no drainage from eyes  Regular rate  no audible wheezing  no abdominal distension  LE compartments soft, skin intact    leftknee:    Appearance:  no swelling   No ecchymosis  no obvious joint deformity   No effusion  Palpation/Tenderness:  No TTP over medial joint line  No TTP over lateral joint line   No TTP over patella  No TTP over patellar tendon  + TTP over pes anserine bursa  Active Range of Motion:  AROM: 0-120  PROM: 0-125  Special Tests:  Medial Gallo's Test:  Negative   Lateral Gallo's Test:  Negative  Apley's compression test:  Negative  Lachman's Test:  " Negative  Anterior Drawer Test:  Negative  Patellar grind:  Negative  Valgus Stress Test:  Negative  Varus Stress Test:  Negative     No ipsilateral hip pain with ROM    leftLE:    Sensation grossly intact  Palpable pulse  AT/GS/EHL intact    RLE:  EHL/AT/GS/quads/hamstrings/iliopsoas 5/5, sensation grossly intact L4, L5, S1, palpable pedal pulse  LLE:  EHL/AT/GS/quads/hamstrings/iliopsoas 5/5, sensation grossly intact L4, L5, S1, palpable pedal pulse      Imaging Studies: I have personally reviewed pertinent films in PACS  XR leftknee:     Moderate left knee arthritis with joint space narrowing and osteophyte formation      Scribe Attestation    I,:  Jessi Zhang am acting as a scribe while in the presence of the attending physician :       I,:  Eliazar Kendrick DO personally performed the services described in this documentation    as scribed in my presence :

## 2023-06-26 DIAGNOSIS — D80.1 HYPOGAMMAGLOBULINEMIA, ACQUIRED (HCC): Primary | ICD-10-CM

## 2023-06-26 DIAGNOSIS — C91.10 CLL (CHRONIC LYMPHOCYTIC LEUKEMIA) (HCC): ICD-10-CM

## 2023-06-27 ENCOUNTER — HOSPITAL ENCOUNTER (OUTPATIENT)
Dept: INFUSION CENTER | Facility: CLINIC | Age: 68
Discharge: HOME | End: 2023-06-27
Payer: MEDICARE

## 2023-06-27 VITALS
RESPIRATION RATE: 18 BRPM | HEART RATE: 79 BPM | TEMPERATURE: 97.7 F | DIASTOLIC BLOOD PRESSURE: 92 MMHG | SYSTOLIC BLOOD PRESSURE: 156 MMHG

## 2023-06-27 DIAGNOSIS — D80.1 HYPOGAMMAGLOBULINEMIA, ACQUIRED (HCC): ICD-10-CM

## 2023-06-27 DIAGNOSIS — C91.10 CLL (CHRONIC LYMPHOCYTIC LEUKEMIA) (HCC): Primary | ICD-10-CM

## 2023-06-27 RX ORDER — ACETAMINOPHEN 325 MG/1
650 TABLET ORAL ONCE
OUTPATIENT
Start: 2023-06-29

## 2023-06-27 RX ORDER — SODIUM CHLORIDE 9 MG/ML
20 INJECTION, SOLUTION INTRAVENOUS ONCE
Status: COMPLETED | OUTPATIENT
Start: 2023-06-27 | End: 2023-06-27

## 2023-06-27 RX ORDER — ACETAMINOPHEN 325 MG/1
650 TABLET ORAL ONCE
Status: COMPLETED | OUTPATIENT
Start: 2023-06-27 | End: 2023-06-27

## 2023-06-27 RX ORDER — SODIUM CHLORIDE 9 MG/ML
20 INJECTION, SOLUTION INTRAVENOUS ONCE
OUTPATIENT
Start: 2023-06-29

## 2023-06-27 RX ADMIN — SODIUM CHLORIDE 20 ML/HR: 0.9 INJECTION, SOLUTION INTRAVENOUS at 08:11

## 2023-06-27 RX ADMIN — ACETAMINOPHEN 650 MG: 325 TABLET ORAL at 08:12

## 2023-06-27 RX ADMIN — DEXAMETHASONE SODIUM PHOSPHATE 4 MG: 10 INJECTION, SOLUTION INTRAMUSCULAR; INTRAVENOUS at 08:11

## 2023-06-27 RX ADMIN — Medication 20 G: at 08:55

## 2023-06-27 RX ADMIN — DIPHENHYDRAMINE HYDROCHLORIDE 12.5 MG: 50 INJECTION, SOLUTION INTRAMUSCULAR; INTRAVENOUS at 08:32

## 2023-07-03 DIAGNOSIS — E78.2 MIXED HYPERLIPIDEMIA: ICD-10-CM

## 2023-07-03 RX ORDER — FENOFIBRATE 145 MG/1
TABLET, COATED ORAL
Qty: 90 TABLET | Refills: 5 | Status: SHIPPED | OUTPATIENT
Start: 2023-07-03

## 2023-07-05 RX ORDER — ACETAMINOPHEN 325 MG/1
650 TABLET ORAL ONCE
Status: CANCELLED | OUTPATIENT
Start: 2023-07-11

## 2023-07-05 RX ORDER — SODIUM CHLORIDE 9 MG/ML
20 INJECTION, SOLUTION INTRAVENOUS ONCE
Status: CANCELLED | OUTPATIENT
Start: 2023-07-11

## 2023-07-11 ENCOUNTER — HOSPITAL ENCOUNTER (OUTPATIENT)
Dept: INFUSION CENTER | Facility: CLINIC | Age: 68
Discharge: HOME | End: 2023-07-11
Payer: MEDICARE

## 2023-07-11 VITALS
RESPIRATION RATE: 18 BRPM | TEMPERATURE: 97.8 F | HEART RATE: 79 BPM | DIASTOLIC BLOOD PRESSURE: 83 MMHG | SYSTOLIC BLOOD PRESSURE: 163 MMHG

## 2023-07-11 DIAGNOSIS — C91.10 CLL (CHRONIC LYMPHOCYTIC LEUKEMIA) (HCC): Primary | ICD-10-CM

## 2023-07-11 LAB
ALBUMIN SERPL BCP-MCNC: 4.2 G/DL (ref 3.5–5)
ALP SERPL-CCNC: 47 U/L (ref 34–104)
ALT SERPL W P-5'-P-CCNC: 34 U/L (ref 7–52)
ANION GAP SERPL CALCULATED.3IONS-SCNC: 5 MMOL/L
AST SERPL W P-5'-P-CCNC: 25 U/L (ref 13–39)
BASOPHILS # BLD MANUAL: 0 THOUSAND/UL (ref 0–0.1)
BASOPHILS NFR MAR MANUAL: 0 % (ref 0–1)
BILIRUB SERPL-MCNC: 0.46 MG/DL (ref 0.2–1)
BUN SERPL-MCNC: 16 MG/DL (ref 5–25)
CALCIUM SERPL-MCNC: 9.4 MG/DL (ref 8.4–10.2)
CHLORIDE SERPL-SCNC: 105 MMOL/L (ref 96–108)
CO2 SERPL-SCNC: 26 MMOL/L (ref 21–32)
CREAT SERPL-MCNC: 1.08 MG/DL (ref 0.6–1.3)
EOSINOPHIL # BLD MANUAL: 0.22 THOUSAND/UL (ref 0–0.4)
EOSINOPHIL NFR BLD MANUAL: 4 % (ref 0–6)
ERYTHROCYTE [DISTWIDTH] IN BLOOD BY AUTOMATED COUNT: 13.2 % (ref 11.6–15.1)
GFR SERPL CREATININE-BSD FRML MDRD: 70 ML/MIN/1.73SQ M
GLUCOSE SERPL-MCNC: 145 MG/DL (ref 65–140)
HCT VFR BLD AUTO: 43.8 % (ref 36.5–49.3)
HGB BLD-MCNC: 14.3 G/DL (ref 12–17)
IGG SERPL-MCNC: 546 MG/DL (ref 700–1600)
LG PLATELETS BLD QL SMEAR: PRESENT
LYMPHOCYTES # BLD AUTO: 0.88 THOUSAND/UL (ref 0.6–4.47)
LYMPHOCYTES # BLD AUTO: 16 % (ref 14–44)
MCH RBC QN AUTO: 30.6 PG (ref 26.8–34.3)
MCHC RBC AUTO-ENTMCNC: 32.6 G/DL (ref 31.4–37.4)
MCV RBC AUTO: 94 FL (ref 82–98)
MONOCYTES # BLD AUTO: 0.88 THOUSAND/UL (ref 0–1.22)
MONOCYTES NFR BLD: 16 % (ref 4–12)
NEUTROPHILS # BLD MANUAL: 3.51 THOUSAND/UL (ref 1.85–7.62)
NEUTS BAND NFR BLD MANUAL: 1 % (ref 0–8)
NEUTS SEG NFR BLD AUTO: 63 % (ref 43–75)
PLATELET # BLD AUTO: 153 THOUSANDS/UL (ref 149–390)
PLATELET BLD QL SMEAR: ADEQUATE
PMV BLD AUTO: 11.5 FL (ref 8.9–12.7)
POTASSIUM SERPL-SCNC: 3.7 MMOL/L (ref 3.5–5.3)
PROT SERPL-MCNC: 6.4 G/DL (ref 6.4–8.4)
RBC # BLD AUTO: 4.67 MILLION/UL (ref 3.88–5.62)
RBC MORPH BLD: NORMAL
RETICS # AUTO: NORMAL 10*3/UL (ref 14356–105094)
RETICS # CALC: 1.77 % (ref 0.37–1.87)
SODIUM SERPL-SCNC: 136 MMOL/L (ref 135–147)
WBC # BLD AUTO: 5.48 THOUSAND/UL (ref 4.31–10.16)

## 2023-07-11 PROCEDURE — 96367 TX/PROPH/DG ADDL SEQ IV INF: CPT

## 2023-07-11 PROCEDURE — 85007 BL SMEAR W/DIFF WBC COUNT: CPT

## 2023-07-11 PROCEDURE — 85045 AUTOMATED RETICULOCYTE COUNT: CPT | Performed by: INTERNAL MEDICINE

## 2023-07-11 PROCEDURE — 85027 COMPLETE CBC AUTOMATED: CPT

## 2023-07-11 PROCEDURE — 96415 CHEMO IV INFUSION ADDL HR: CPT

## 2023-07-11 PROCEDURE — 96413 CHEMO IV INFUSION 1 HR: CPT

## 2023-07-11 PROCEDURE — 80053 COMPREHEN METABOLIC PANEL: CPT

## 2023-07-11 PROCEDURE — 82784 ASSAY IGA/IGD/IGG/IGM EACH: CPT | Performed by: INTERNAL MEDICINE

## 2023-07-11 RX ORDER — ACETAMINOPHEN 325 MG/1
650 TABLET ORAL ONCE
Status: COMPLETED | OUTPATIENT
Start: 2023-07-11 | End: 2023-07-11

## 2023-07-11 RX ORDER — SODIUM CHLORIDE 9 MG/ML
20 INJECTION, SOLUTION INTRAVENOUS ONCE
Status: COMPLETED | OUTPATIENT
Start: 2023-07-11 | End: 2023-07-11

## 2023-07-11 RX ADMIN — SODIUM CHLORIDE 20 ML/HR: 0.9 INJECTION, SOLUTION INTRAVENOUS at 08:32

## 2023-07-11 RX ADMIN — ACETAMINOPHEN 650 MG: 325 TABLET ORAL at 08:34

## 2023-07-11 RX ADMIN — OBINUTUZUMAB 1000 MG: 1000 INJECTION, SOLUTION, CONCENTRATE INTRAVENOUS at 09:45

## 2023-07-11 RX ADMIN — DEXAMETHASONE SODIUM PHOSPHATE 20 MG: 10 INJECTION, SOLUTION INTRAMUSCULAR; INTRAVENOUS at 08:32

## 2023-07-11 RX ADMIN — DIPHENHYDRAMINE HYDROCHLORIDE 25 MG: 50 INJECTION, SOLUTION INTRAMUSCULAR; INTRAVENOUS at 08:50

## 2023-07-11 NOTE — PROGRESS NOTES
Patient arrived to unit without complaint. Patient tolerated chemotherapy without incident. AVS declined and patient aware of next appointment. Patient left in stable condition.

## 2023-07-11 NOTE — PLAN OF CARE
Problem: Potential for Falls  Goal: Patient will remain free of falls  Description: INTERVENTIONS:  - Educate patient/family on patient safety including physical limitations  - Instruct patient to call for assistance with activity   - Consult OT/PT to assist with strengthening/mobility   - Keep Call bell within reach  - Keep bed low and locked with side rails adjusted as appropriate  - Keep care items and personal belongings within reach  - Initiate and maintain comfort rounds  - Make Fall Risk Sign visible to staff  - - Apply yellow socks and bracelet for high fall risk patients  - Consider moving patient to room near nurses station  7/11/2023 0932 by Kenyon Garza, RN  Outcome: Progressing  7/11/2023 0932 by Kenyon Garza, RN  Outcome: Progressing

## 2023-07-17 DIAGNOSIS — I10 ESSENTIAL HYPERTENSION: ICD-10-CM

## 2023-07-17 RX ORDER — LOSARTAN POTASSIUM 100 MG/1
TABLET ORAL
Qty: 90 TABLET | Refills: 1 | Status: SHIPPED | OUTPATIENT
Start: 2023-07-17

## 2023-07-18 DIAGNOSIS — F41.9 ANXIETY DISORDER, UNSPECIFIED TYPE: ICD-10-CM

## 2023-07-19 RX ORDER — LORAZEPAM 0.5 MG/1
0.5 TABLET ORAL DAILY PRN
Qty: 30 TABLET | Refills: 0 | Status: SHIPPED | OUTPATIENT
Start: 2023-07-19

## 2023-07-25 ENCOUNTER — HOSPITAL ENCOUNTER (OUTPATIENT)
Dept: INFUSION CENTER | Facility: CLINIC | Age: 68
Discharge: HOME | End: 2023-07-25
Payer: MEDICARE

## 2023-07-25 VITALS
RESPIRATION RATE: 18 BRPM | DIASTOLIC BLOOD PRESSURE: 90 MMHG | TEMPERATURE: 98 F | HEART RATE: 81 BPM | SYSTOLIC BLOOD PRESSURE: 155 MMHG

## 2023-07-25 DIAGNOSIS — C91.10 CLL (CHRONIC LYMPHOCYTIC LEUKEMIA) (HCC): Primary | ICD-10-CM

## 2023-07-25 DIAGNOSIS — D80.1 HYPOGAMMAGLOBULINEMIA, ACQUIRED (HCC): ICD-10-CM

## 2023-07-25 PROCEDURE — 96366 THER/PROPH/DIAG IV INF ADDON: CPT

## 2023-07-25 PROCEDURE — 96365 THER/PROPH/DIAG IV INF INIT: CPT

## 2023-07-25 PROCEDURE — 96367 TX/PROPH/DG ADDL SEQ IV INF: CPT

## 2023-07-25 RX ORDER — SODIUM CHLORIDE 9 MG/ML
20 INJECTION, SOLUTION INTRAVENOUS ONCE
Status: COMPLETED | OUTPATIENT
Start: 2023-07-25 | End: 2023-07-25

## 2023-07-25 RX ORDER — SODIUM CHLORIDE 9 MG/ML
20 INJECTION, SOLUTION INTRAVENOUS ONCE
OUTPATIENT
Start: 2023-08-22

## 2023-07-25 RX ORDER — ACETAMINOPHEN 325 MG/1
650 TABLET ORAL ONCE
Status: COMPLETED | OUTPATIENT
Start: 2023-07-25 | End: 2023-07-25

## 2023-07-25 RX ORDER — ACETAMINOPHEN 325 MG/1
650 TABLET ORAL ONCE
OUTPATIENT
Start: 2023-08-22

## 2023-07-25 RX ADMIN — SODIUM CHLORIDE 20 ML/HR: 0.9 INJECTION, SOLUTION INTRAVENOUS at 08:24

## 2023-07-25 RX ADMIN — DIPHENHYDRAMINE HYDROCHLORIDE 12.5 MG: 50 INJECTION, SOLUTION INTRAMUSCULAR; INTRAVENOUS at 08:46

## 2023-07-25 RX ADMIN — Medication 20 G: at 09:07

## 2023-07-25 RX ADMIN — DEXAMETHASONE SODIUM PHOSPHATE 4 MG: 10 INJECTION, SOLUTION INTRAMUSCULAR; INTRAVENOUS at 08:24

## 2023-07-25 RX ADMIN — ACETAMINOPHEN 650 MG: 325 TABLET ORAL at 08:26

## 2023-07-28 NOTE — PLAN OF CARE
Problem: Knowledge Deficit  Goal: Patient/family/caregiver demonstrates understanding of disease process, treatment plan, medications, and discharge instructions  Description: Complete learning assessment and assess knowledge base    Interventions:  - Provide teaching at level of understanding  - Provide teaching via preferred learning methods  Outcome: Progressing poor ability to follow directions; able to bear weight through b/l LEs/grossly assessed due to

## 2023-08-01 DIAGNOSIS — E11.9 TYPE 2 DIABETES MELLITUS WITHOUT COMPLICATION, WITH LONG-TERM CURRENT USE OF INSULIN (HCC): Primary | ICD-10-CM

## 2023-08-01 DIAGNOSIS — Z79.4 TYPE 2 DIABETES MELLITUS WITHOUT COMPLICATION, WITH LONG-TERM CURRENT USE OF INSULIN (HCC): Primary | ICD-10-CM

## 2023-08-01 RX ORDER — BLOOD-GLUCOSE METER
KIT MISCELLANEOUS
Qty: 300 STRIP | Refills: 1 | Status: SHIPPED | OUTPATIENT
Start: 2023-08-01

## 2023-08-01 RX ORDER — BLOOD-GLUCOSE METER
KIT MISCELLANEOUS 3 TIMES DAILY
Qty: 1 KIT | Refills: 0 | Status: SHIPPED | OUTPATIENT
Start: 2023-08-01

## 2023-08-01 RX ORDER — LANCETS 28 GAUGE
EACH MISCELLANEOUS
Qty: 300 EACH | Refills: 1 | Status: SHIPPED | OUTPATIENT
Start: 2023-08-01

## 2023-08-01 NOTE — TELEPHONE ENCOUNTER
Patient called today to let us know that his BS was low this morning 54, but he did not have any symptoms  Of hypoglycemia  He thinks that the Donovan Fraga was giving him false number , so he did removed the sensors and place a new sensor but the new sensor is not working , I  did asked pt is he check his BS with his glucometer he said no , he is not sure if he has one, Told pt that I will sent new supplies to his local pharmacy, he will need to check his BS and he will need t call [facility] for them to replace his sensor , also told pt if his BS are low to call us back.

## 2023-08-04 RX ORDER — ACETAMINOPHEN 325 MG/1
650 TABLET ORAL ONCE
Status: CANCELLED | OUTPATIENT
Start: 2023-08-08

## 2023-08-04 RX ORDER — SODIUM CHLORIDE 9 MG/ML
20 INJECTION, SOLUTION INTRAVENOUS ONCE
Status: CANCELLED | OUTPATIENT
Start: 2023-08-08

## 2023-08-08 ENCOUNTER — HOSPITAL ENCOUNTER (OUTPATIENT)
Dept: INFUSION CENTER | Facility: CLINIC | Age: 68
Discharge: HOME | End: 2023-08-08
Payer: MEDICARE

## 2023-08-08 VITALS
DIASTOLIC BLOOD PRESSURE: 75 MMHG | TEMPERATURE: 97.9 F | BODY MASS INDEX: 31.03 KG/M2 | RESPIRATION RATE: 18 BRPM | HEART RATE: 72 BPM | WEIGHT: 234.13 LBS | HEIGHT: 73 IN | SYSTOLIC BLOOD PRESSURE: 125 MMHG

## 2023-08-08 DIAGNOSIS — D80.1 HYPOGAMMAGLOBULINEMIA, ACQUIRED (HCC): ICD-10-CM

## 2023-08-08 DIAGNOSIS — C91.10 CLL (CHRONIC LYMPHOCYTIC LEUKEMIA) (HCC): Primary | ICD-10-CM

## 2023-08-08 DIAGNOSIS — Z79.4 TYPE 2 DIABETES MELLITUS WITH DIABETIC POLYNEUROPATHY, WITH LONG-TERM CURRENT USE OF INSULIN (HCC): ICD-10-CM

## 2023-08-08 DIAGNOSIS — T38.0X5S STEROID-INDUCED DIABETES MELLITUS, SEQUELA (HCC): ICD-10-CM

## 2023-08-08 DIAGNOSIS — E11.42 TYPE 2 DIABETES MELLITUS WITH DIABETIC POLYNEUROPATHY, WITH LONG-TERM CURRENT USE OF INSULIN (HCC): ICD-10-CM

## 2023-08-08 DIAGNOSIS — E09.9 STEROID-INDUCED DIABETES MELLITUS, SEQUELA (HCC): ICD-10-CM

## 2023-08-08 LAB
ALBUMIN SERPL BCP-MCNC: 4.2 G/DL (ref 3.5–5)
ALP SERPL-CCNC: 40 U/L (ref 34–104)
ALT SERPL W P-5'-P-CCNC: 42 U/L (ref 7–52)
ANION GAP SERPL CALCULATED.3IONS-SCNC: 5 MMOL/L
AST SERPL W P-5'-P-CCNC: 31 U/L (ref 13–39)
BASOPHILS # BLD MANUAL: 0.05 THOUSAND/UL (ref 0–0.1)
BASOPHILS NFR MAR MANUAL: 1 % (ref 0–1)
BILIRUB SERPL-MCNC: 0.43 MG/DL (ref 0.2–1)
BUN SERPL-MCNC: 17 MG/DL (ref 5–25)
CALCIUM SERPL-MCNC: 8.9 MG/DL (ref 8.4–10.2)
CHLORIDE SERPL-SCNC: 108 MMOL/L (ref 96–108)
CO2 SERPL-SCNC: 26 MMOL/L (ref 21–32)
CREAT SERPL-MCNC: 1.12 MG/DL (ref 0.6–1.3)
EOSINOPHIL # BLD MANUAL: 0.35 THOUSAND/UL (ref 0–0.4)
EOSINOPHIL NFR BLD MANUAL: 7 % (ref 0–6)
ERYTHROCYTE [DISTWIDTH] IN BLOOD BY AUTOMATED COUNT: 13.7 % (ref 11.6–15.1)
GFR SERPL CREATININE-BSD FRML MDRD: 67 ML/MIN/1.73SQ M
GLUCOSE SERPL-MCNC: 140 MG/DL (ref 65–140)
HCT VFR BLD AUTO: 43.9 % (ref 36.5–49.3)
HGB BLD-MCNC: 14 G/DL (ref 12–17)
LYMPHOCYTES # BLD AUTO: 1.3 THOUSAND/UL (ref 0.6–4.47)
LYMPHOCYTES # BLD AUTO: 26 % (ref 14–44)
MCH RBC QN AUTO: 30.3 PG (ref 26.8–34.3)
MCHC RBC AUTO-ENTMCNC: 31.9 G/DL (ref 31.4–37.4)
MCV RBC AUTO: 95 FL (ref 82–98)
MONOCYTES # BLD AUTO: 0.65 THOUSAND/UL (ref 0–1.22)
MONOCYTES NFR BLD: 13 % (ref 4–12)
NEUTROPHILS # BLD MANUAL: 2.65 THOUSAND/UL (ref 1.85–7.62)
NEUTS SEG NFR BLD AUTO: 53 % (ref 43–75)
PLATELET # BLD AUTO: 148 THOUSANDS/UL (ref 149–390)
PLATELET BLD QL SMEAR: ABNORMAL
PMV BLD AUTO: 11.2 FL (ref 8.9–12.7)
POTASSIUM SERPL-SCNC: 3.9 MMOL/L (ref 3.5–5.3)
PROT SERPL-MCNC: 6.2 G/DL (ref 6.4–8.4)
RBC # BLD AUTO: 4.62 MILLION/UL (ref 3.88–5.62)
RBC MORPH BLD: NORMAL
RETICS # AUTO: NORMAL 10*3/UL (ref 14356–105094)
RETICS # CALC: 1.82 % (ref 0.37–1.87)
SODIUM SERPL-SCNC: 139 MMOL/L (ref 135–147)
TSH SERPL DL<=0.05 MIU/L-ACNC: 2.12 UIU/ML (ref 0.45–4.5)
WBC # BLD AUTO: 5 THOUSAND/UL (ref 4.31–10.16)

## 2023-08-08 PROCEDURE — 82784 ASSAY IGA/IGD/IGG/IGM EACH: CPT

## 2023-08-08 PROCEDURE — 85045 AUTOMATED RETICULOCYTE COUNT: CPT | Performed by: INTERNAL MEDICINE

## 2023-08-08 PROCEDURE — 80053 COMPREHEN METABOLIC PANEL: CPT | Performed by: INTERNAL MEDICINE

## 2023-08-08 PROCEDURE — 85007 BL SMEAR W/DIFF WBC COUNT: CPT | Performed by: INTERNAL MEDICINE

## 2023-08-08 PROCEDURE — 84443 ASSAY THYROID STIM HORMONE: CPT

## 2023-08-08 PROCEDURE — 84439 ASSAY OF FREE THYROXINE: CPT

## 2023-08-08 PROCEDURE — 85027 COMPLETE CBC AUTOMATED: CPT | Performed by: INTERNAL MEDICINE

## 2023-08-08 RX ORDER — SODIUM CHLORIDE 9 MG/ML
20 INJECTION, SOLUTION INTRAVENOUS ONCE
Status: COMPLETED | OUTPATIENT
Start: 2023-08-08 | End: 2023-08-08

## 2023-08-08 RX ORDER — ACETAMINOPHEN 325 MG/1
650 TABLET ORAL ONCE
Status: COMPLETED | OUTPATIENT
Start: 2023-08-08 | End: 2023-08-08

## 2023-08-08 RX ADMIN — OBINUTUZUMAB 1000 MG: 1000 INJECTION, SOLUTION, CONCENTRATE INTRAVENOUS at 09:26

## 2023-08-08 RX ADMIN — ACETAMINOPHEN 650 MG: 325 TABLET ORAL at 08:36

## 2023-08-08 RX ADMIN — DEXAMETHASONE SODIUM PHOSPHATE 20 MG: 10 INJECTION, SOLUTION INTRAMUSCULAR; INTRAVENOUS at 08:36

## 2023-08-08 RX ADMIN — DIPHENHYDRAMINE HYDROCHLORIDE 25 MG: 50 INJECTION, SOLUTION INTRAMUSCULAR; INTRAVENOUS at 08:54

## 2023-08-08 RX ADMIN — SODIUM CHLORIDE 20 ML/HR: 9 INJECTION, SOLUTION INTRAVENOUS at 08:36

## 2023-08-08 NOTE — PROGRESS NOTES
Pt presents for gazyva. No new meds or concerns. Pt tolerated treatment without adverse reaction. Future appointments discussed. AVS declined.

## 2023-08-09 LAB
IGG SERPL-MCNC: 516 MG/DL (ref 700–1600)
T4 FREE SERPL-MCNC: 0.74 NG/DL (ref 0.61–1.12)

## 2023-08-14 DIAGNOSIS — E11.42 TYPE 2 DIABETES MELLITUS WITH DIABETIC POLYNEUROPATHY, WITH LONG-TERM CURRENT USE OF INSULIN (HCC): ICD-10-CM

## 2023-08-14 DIAGNOSIS — Z79.4 TYPE 2 DIABETES MELLITUS WITH DIABETIC POLYNEUROPATHY, WITH LONG-TERM CURRENT USE OF INSULIN (HCC): ICD-10-CM

## 2023-08-22 ENCOUNTER — HOSPITAL ENCOUNTER (OUTPATIENT)
Dept: INFUSION CENTER | Facility: CLINIC | Age: 68
Discharge: HOME | End: 2023-08-22
Payer: MEDICARE

## 2023-08-22 VITALS
SYSTOLIC BLOOD PRESSURE: 156 MMHG | WEIGHT: 231.92 LBS | TEMPERATURE: 97.2 F | RESPIRATION RATE: 18 BRPM | BODY MASS INDEX: 30.61 KG/M2 | HEART RATE: 86 BPM | DIASTOLIC BLOOD PRESSURE: 90 MMHG

## 2023-08-22 DIAGNOSIS — D80.1 HYPOGAMMAGLOBULINEMIA, ACQUIRED (HCC): ICD-10-CM

## 2023-08-22 DIAGNOSIS — C91.10 CLL (CHRONIC LYMPHOCYTIC LEUKEMIA) (HCC): Primary | ICD-10-CM

## 2023-08-22 PROCEDURE — 96365 THER/PROPH/DIAG IV INF INIT: CPT

## 2023-08-22 PROCEDURE — 96366 THER/PROPH/DIAG IV INF ADDON: CPT

## 2023-08-22 PROCEDURE — 96367 TX/PROPH/DG ADDL SEQ IV INF: CPT

## 2023-08-22 RX ORDER — SODIUM CHLORIDE 9 MG/ML
20 INJECTION, SOLUTION INTRAVENOUS ONCE
OUTPATIENT
Start: 2023-09-19

## 2023-08-22 RX ORDER — SODIUM CHLORIDE 9 MG/ML
20 INJECTION, SOLUTION INTRAVENOUS ONCE
Status: COMPLETED | OUTPATIENT
Start: 2023-08-22 | End: 2023-08-22

## 2023-08-22 RX ORDER — ACETAMINOPHEN 325 MG/1
650 TABLET ORAL ONCE
OUTPATIENT
Start: 2023-09-19

## 2023-08-22 RX ORDER — ACETAMINOPHEN 325 MG/1
650 TABLET ORAL ONCE
Status: COMPLETED | OUTPATIENT
Start: 2023-08-22 | End: 2023-08-22

## 2023-08-22 RX ADMIN — DEXAMETHASONE SODIUM PHOSPHATE 4 MG: 10 INJECTION, SOLUTION INTRAMUSCULAR; INTRAVENOUS at 08:22

## 2023-08-22 RX ADMIN — DIPHENHYDRAMINE HYDROCHLORIDE 12.5 MG: 50 INJECTION, SOLUTION INTRAMUSCULAR; INTRAVENOUS at 08:44

## 2023-08-22 RX ADMIN — Medication 20 G: at 09:20

## 2023-08-22 RX ADMIN — ACETAMINOPHEN 650 MG: 325 TABLET ORAL at 08:21

## 2023-08-22 RX ADMIN — SODIUM CHLORIDE 20 ML/HR: 0.9 INJECTION, SOLUTION INTRAVENOUS at 08:21

## 2023-08-23 ENCOUNTER — OFFICE VISIT (OUTPATIENT)
Dept: ENDOCRINOLOGY | Facility: CLINIC | Age: 68
End: 2023-08-23
Payer: MEDICARE

## 2023-08-23 VITALS
BODY MASS INDEX: 31.28 KG/M2 | SYSTOLIC BLOOD PRESSURE: 140 MMHG | HEIGHT: 73 IN | WEIGHT: 236 LBS | HEART RATE: 80 BPM | DIASTOLIC BLOOD PRESSURE: 60 MMHG

## 2023-08-23 DIAGNOSIS — E09.9 STEROID-INDUCED DIABETES MELLITUS, SEQUELA (HCC): ICD-10-CM

## 2023-08-23 DIAGNOSIS — Z79.4 TYPE 2 DIABETES MELLITUS WITHOUT COMPLICATION, WITH LONG-TERM CURRENT USE OF INSULIN (HCC): ICD-10-CM

## 2023-08-23 DIAGNOSIS — T38.0X5S STEROID-INDUCED DIABETES MELLITUS, SEQUELA (HCC): ICD-10-CM

## 2023-08-23 DIAGNOSIS — I10 PRIMARY HYPERTENSION: ICD-10-CM

## 2023-08-23 DIAGNOSIS — Z79.4 TYPE 2 DIABETES MELLITUS WITH DIABETIC POLYNEUROPATHY, WITH LONG-TERM CURRENT USE OF INSULIN (HCC): Primary | ICD-10-CM

## 2023-08-23 DIAGNOSIS — E11.42 TYPE 2 DIABETES MELLITUS WITH DIABETIC POLYNEUROPATHY, WITH LONG-TERM CURRENT USE OF INSULIN (HCC): Primary | ICD-10-CM

## 2023-08-23 DIAGNOSIS — E78.2 MIXED HYPERLIPIDEMIA: ICD-10-CM

## 2023-08-23 DIAGNOSIS — E11.9 TYPE 2 DIABETES MELLITUS WITHOUT COMPLICATION, WITH LONG-TERM CURRENT USE OF INSULIN (HCC): ICD-10-CM

## 2023-08-23 LAB — SL AMB POCT HEMOGLOBIN AIC: 6.7 (ref ?–6.5)

## 2023-08-23 PROCEDURE — 99214 OFFICE O/P EST MOD 30 MIN: CPT | Performed by: PHYSICIAN ASSISTANT

## 2023-08-23 PROCEDURE — 83036 HEMOGLOBIN GLYCOSYLATED A1C: CPT | Performed by: PHYSICIAN ASSISTANT

## 2023-08-23 PROCEDURE — 95251 CONT GLUC MNTR ANALYSIS I&R: CPT | Performed by: PHYSICIAN ASSISTANT

## 2023-08-23 RX ORDER — INSULIN LISPRO 100 [IU]/ML
INJECTION, SOLUTION INTRAVENOUS; SUBCUTANEOUS
Qty: 15 ML | Refills: 5 | Status: SHIPPED | OUTPATIENT
Start: 2023-08-23

## 2023-08-23 RX ORDER — INSULIN DEGLUDEC INJECTION 100 U/ML
18 INJECTION, SOLUTION SUBCUTANEOUS
Qty: 15 ML | Refills: 3 | Status: SHIPPED | OUTPATIENT
Start: 2023-08-23

## 2023-08-23 NOTE — ASSESSMENT & PLAN NOTE
Diabetes under good control. Continue current regimen and lifestyle modification.    Lab Results   Component Value Date    HGBA1C 6.7 (A) 08/23/2023

## 2023-08-23 NOTE — PROGRESS NOTES
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 Continue current regimen and lifestyle modification. Lab Results   Component Value Date    HGBA1C 6.7 (A) 08/23/2023            Relevant Medications    insulin lispro (Admelog SoloStar) 100 units/mL injection pen    insulin degludec [employer] FlexTouch) 100 units/mL injection pen       Cardiovascular and Mediastinum    Hypertension     BP slightly high today (140/60) and elevated yesterday (156/90) at infusion. Was 125/75 on 8/8/23 medical visit. Continue current regimen and will monitor. He did have irregular heartbeat on exam today. He denies palpitations but reports recent sensation of feeling heartbeat/wooshing in his head. No symptoms at this time and pulse is 80. Advised him to follow up with his cardiologist for eval.             Other    Hyperlipidemia     Continue fenofibrate. History of statin intolerance. Orders Placed This Encounter   Procedures   • POCT hemoglobin A1c       History of Present Illness:   Buffy Nguyễn is a 70 y.o. 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 Reports complications of none. Denies recent illness or hospitalizations. Denies recent severe hypoglycemic or severe hyperglycemic episodes.  Denies any issues with his current regimen. home glucose monitoring: are performed regularly   using freestyle merary sensor    On Prednisone 5mg daily For CLL and chemo infusion every 2 weeks  Gets steroids with infusion every 2 weeks, last infusion 8/22  Hx metformin intolerance    CGM Interpretation  Peter MANDI Aniades   Device used [employer] 2, Home use   Indication:  Type 2 Diabetes  More than 72 hours of data was reviewed. Report to be scanned to chart. Date Range: 8/8/2023-8/21/2023  Analysis of data:   Average Glucose: 150  Coefficient of Variation: 29.4%   Time in Target Range: 80%   Time Above Range: 17% high, 3% very high   Time Below Range: 0%   Interpretation of data:   Overall control is excellent with occasional post-meal hyperglycemia (Such as [birth year] on his birthday when he had chinese food)  Hypoglycemia is rare     Current regimen:   Tresiba 18 units daily   Admelog 12-14 units before meals    Last Eye Exam: UTD 9/2022  Last Foot Exam: UTD 2/2023    Has hypertension: Taking amlodipine and losartan  Has hyperlipidemia: Taking fenofibrate.  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[facility])   • Anemia, chronic disease   • Chronic lymphocytic leukemia (HCC)   • Leukopenia due to antineoplastic chemotherapy (HCC)   • Hyperglycemia   • Cellulitis of head or scalp   • Pancytopenia (HCC)   • Headache around the eyes   • Gastroesophageal reflux disease without esophagitis   • Colitis, acute   • Listeria bacteremia   • Thrombocytopenia (HCC)   • Diabetes mellitus ([address])   • 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HCC)   • Chronic right shoulder pain   • 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[address])   • Ulcer of esophagus without bleeding   • Esophageal stricture   • Dysphagia   • Esophageal dysphagia   • Acute bursitis of right shoulder   • Hypogammaglobulinemia, acquired ([address])   • Autoimmune hemolytic anemia ([address]) • Right upper quadrant abdominal pain   • Chronic obstructive pulmonary disease (HCC)   • Depression, recurrent (HCC)   • Long term (current) use of systemic steroids   • Cancer related pain      Past Medical History:   Diagnosis Date   • Allergic    • Anemia    • Arthritis    • CAD (coronary artery disease) 2004   • Cancer ([address])     Leukemia   • Cellulitis of scalp    • CKD (chronic kidney disease) stage 3, GFR 30-59 ml/min (HCC)    • CLL (chronic lymphocytic leukemia) (HCC)    • COPD (chronic obstructive pulmonary disease) (HCC)    • Diabetes mellitus ([address])     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   • Dyslipidemia    • Edema extremities    • Esophageal ulcer    • H/O blood clots    • Hemolytic anemia (HCC)    • Hiatal hernia    • History of TIA (transient ischemic attack)    • Hyperlipidemia    • Hypertension    • Listeria infection 2018   • Multiple gastric ulcers    • Myocardial infarction [facility]) 2004    acute   • Neutropenia (HCC)    • Obese    • Recurrent sinusitis    • Thrombocytopenia (HCC)    • Vertigo       Past Surgical History:   Procedure Laterality Date   • ARTHROSCOPIC REPAIR ACL      lt knee   • CARDIAC SURGERY      cardiac cath with stent   • FOOT SURGERY     • IR PORT CHECK  5/17/2019   • KNEE ARTHROSCOPY     • OTHER SURGICAL HISTORY      cardiac cath lesion 1, 1st adjunct treat device: stent   • PORTACATH PLACEMENT     • [place] ESOPHAGOGASTRODUODENOSCOPY TRANSORAL DIAGNOSTIC N/A 10/5/2017    Procedure: ESOPHAGOGASTRODUODENOSCOPY (EGD) with bx;  Surgeon: Kaleigh Henao DO;  Location: [facility]; Service: Gastroenterology   • [place] ESOPHAGOGASTRODUODENOSCOPY TRANSORAL DIAGNOSTIC N/A 3/8/2018    Procedure: ESOPHAGOGASTRODUODENOSCOPY (EGD) with biopsy;  Surgeon: Kaleigh Henao DO;  Location: [facility]; Service: Gastroenterology   • [place] ESOPHAGOGASTRODUODENOSCOPY TRANSORAL DIAGNOSTIC N/A 6/20/2018    Procedure: ESOPHAGOGASTRODUODENOSCOPY (EGD) with Dilation;  Surgeon: Kaleigh Henao DO;  Location: [facility];   Service: Gastroenterology   • [place] ESOPHAGOGASTRODUODENOSCOPY TRANSORAL DIAGNOSTIC N/A 10/18/2018    Procedure: ESOPHAGOGASTRODUODENOSCOPY (EGD) with dilation;  Surgeon: Nick Sosa MD;  Location: [facility]; Service: Gastroenterology   • [place] ESOPHAGOSCOPY FLEXIBLE TRANSORAL WITH BIOPSY N/A 2016    Procedure: ESOPHAGOGASTRODUODENOSCOPY (EGD); ESOPHAGEAL DILATION; ESOPHAGEAL BIOPSY;  Surgeon: Mendel Hoes, MD;  Location: [facility];  Service: Thoracic   • TONSILLECTOMY     • UPPER GASTROINTESTINAL ENDOSCOPY      x 6      Family History   Problem Relation Age of Onset   • Leukemia Mother         chronic   • Colon polyps Mother         sidmoid   • Parkinsonism Father      Social History     Tobacco Use   • Smoking status: Former     Packs/day: 2.00     Years: 33.00     Total pack years: 66.00     Types: Cigarettes     Start date: 1     Quit date:      Years since quittin.6   • Smokeless tobacco: Never   Substance Use Topics   • Alcohol use:  Yes     Alcohol/week: 2.0 standard drinks of alcohol     Types: 2 Cans of beer per week     Comment: social use as per [employer]     Allergies   Allergen Reactions   • Heparin Other (See Comments)     Other reaction(s): Blood Disorders  clot   • Macrolides And Ketolides Other (See Comments)     Interacts with other meds he is taking   • Simvastatin Myalgia         Current Outpatient Medications:   •  amLODIPine (NORVASC) 10 mg tablet, Take 1 tablet (10 mg total) by mouth daily, Disp: 90 tablet, Rfl: 1  •  aspirin 81 MG tablet, Take 81 mg by mouth daily Every other day, Disp: , Rfl:   •  Blood Glucose Monitoring Suppl (FreeStyle Freedom Lite) w/Device KIT, Use 3 (three) times a day, Disp: 1 kit, Rfl: 0  •  citalopram (CeleXA) 40 mg tablet, TAKE 1 TABLET DAILY, Disp: 90 tablet, Rfl: 1  •  Continuous Blood Gluc  (FreeStyle Ashley 2 Bowlegs) JOE, Use to scan sensor premeals and at bedtime, Disp: 1 each, Rfl: 1  •  Continuous Blood Gluc Sensor (FreeStyle Ashley 2 Sensor) MISC, Apply 1 sensor every 14 days. Use as directed with Freestyle Ashley 2 reader., Disp: 2 each, Rfl: 2  •  Diclofenac Sodium (VOLTAREN) 1 %, Apply 4 g topically 4 (four) times a day, Disp: 100 g, Rfl: 0  •  fenofibrate (TRICOR) 145 mg tablet, TAKE 1 TABLET DAILY, Disp: 90 tablet, Rfl: 5  •  fluticasone (FLONASE) 50 mcg/act nasal spray, 2 sprays into each nostril daily, Disp: 16 g, Rfl: 0  •  folic acid (FOLVITE) 1 mg tablet, Take 800 mcg by mouth daily , Disp: , Rfl:   •  gabapentin (NEURONTIN) 600 MG tablet, TAKE 1 TABLET DAILY, Disp: 90 tablet, Rfl: 1  •  glucose blood (FREESTYLE LITE) test strip, Check blood sugar 3 times a day, Disp: 300 strip, Rfl: 1  •  Ibrutinib 140 MG TABS, Take 140 mg by mouth daily, Disp: 30 tablet, Rfl: 6  •  insulin degludec Denise Tristanian FlexTouch) 100 units/mL injection pen, Inject 18 Units under the skin daily at bedtime, Disp: 15 mL, Rfl: 3  •  insulin lispro (Admelog SoloStar) 100 units/mL injection pen, 12-14 units before each meal, Disp: 15 mL, Rfl: 5  •  Insulin Pen Needle (BD Pen Needle Inez U/F) 32G X 4 MM MISC, Use 3 (three) times a day, Disp: 300 each, Rfl: 0  •  Lancets (freestyle) lancets, TEST BLOOD SUGAR 3 TIMES A DAY, Disp: 300 each, Rfl: 1  •  LORazepam (ATIVAN) 0.5 mg tablet, Take 1 tablet (0.5 mg total) by mouth daily as needed for anxiety, Disp: 30 tablet, Rfl: 0  •  losartan (COZAAR) 100 MG tablet, TAKE 1 TABLET DAILY, Disp: 90 tablet, Rfl: 1  •  mometasone-formoterol (Dulera) 200-5 MCG/ACT inhaler, Inhale 2 puffs 2 (two) times a day Rinse mouth after use., Disp: 13 g, Rfl: 1  •  multivitamin (THERAGRAN) TABS, Take 1 tablet by mouth daily, Disp: , Rfl:   •  naloxone (NARCAN) 4 mg/0.1 mL nasal spray, Administer 1 spray into a nostril.  If no response after 2-3 minutes, give another dose in the other nostril using a new spray., Disp: 1 each, Rfl: 1  •  obinutuzumab (GAZYVA) 1000 mg/40 mL SOLN, Infuse into a venous catheter, Disp: , Rfl:   •  oxyCODONE (ROXICODONE) 10 MG TABS, Take 1 tablet (10 mg total) by mouth every 6 (six) hours as needed for moderate pain For ongoing pain control Max Daily Amount: 40 mg, Disp: 28 tablet, Rfl: 0  •  pantoprazole (PROTONIX) 40 mg tablet, Take 1 tablet (40 mg total) by mouth 2 (two) times a day, Disp: 180 tablet, Rfl: 3  •  predniSONE 5 mg tablet, TAKE 1 TABLET DAILY, Disp: 90 tablet, Rfl: 5  •  zolpidem (AMBIEN) 5 mg tablet, Take 1 tablet (5 mg total) by mouth daily at bedtime as needed for sleep, Disp: 30 tablet, Rfl: 0  •  acyclovir (ZOVIRAX) 400 MG tablet, TAKE 1 TABLET TWICE A DAY (Patient not taking: Reported on 5/4/2023), Disp: 60 tablet, Rfl: 11  •  albuterol (2.5 mg/3 mL) 0.083 % nebulizer solution, Take 3 mL (2.5 mg total) by nebulization every 6 (six) hours as needed for wheezing or shortness of breath (Patient not taking: Reported on 6/9/2023), Disp: 180 mL, Rfl: 5  •  albuterol (Ventolin HFA) 90 mcg/act inhaler, Inhale 2 puffs every 6 (six) hours as needed for wheezing (Patient not taking: Reported on 6/9/2023), Disp: 54 g, Rfl: 1  •  benzonatate (TESSALON) 200 MG capsule, TAKE 1 CAPSULE THREE TIMES A DAY AS NEEDED FOR COUGH (Patient not taking: Reported on 5/4/2023), Disp: 20 capsule, Rfl: 51  •  Lancets (FREESTYLE) lancets, Use as instructed--test 2 times a day  E 11.9 (Patient not taking: Reported on 8/23/2023), Disp: 100 each, Rfl: 0  •  naloxone (NARCAN) 4 mg/0.1 mL nasal spray, Administer 1 spray into a nostril. If no response after 2-3 minutes, give another dose in the other nostril using a new spray. (Patient not taking: Reported on 8/23/2023), Disp: 1 each, Rfl: 1  •  sodium chloride, PF, 0.9 %, 10 mL by Intracatheter route see administration instructions 10mL into port before and after ampicillin doses (Patient not taking: Reported on 5/4/2023), Disp: , Rfl: 0  No current facility-administered medications for this visit. Review of Systems   Constitutional: Negative for activity change, appetite change and fatigue.    HENT: Negative for sore throat, trouble swallowing and voice change. Eyes: Negative for visual disturbance. Respiratory: Negative for choking, chest tightness and shortness of breath. Cardiovascular: Negative for chest pain, palpitations and leg swelling. Gastrointestinal: Negative for abdominal pain, constipation and diarrhea. Endocrine: Negative for cold intolerance, heat intolerance, polydipsia, polyphagia and polyuria. Genitourinary: Negative for frequency. Musculoskeletal: Negative for arthralgias and myalgias. Skin: Negative for rash. Neurological: Negative for dizziness and syncope. Hematological: Negative for adenopathy. Psychiatric/Behavioral: Negative for sleep disturbance. All other systems reviewed and are negative. Physical Exam:  Body mass index is 31.14 kg/m². /60   Pulse 80   Ht 6' 1" (1.854 m)   Wt 107 kg (236 lb)   BMI 31.14 kg/m²    Wt Readings from Last 3 Encounters:   08/23/23 107 kg (236 lb)   08/22/23 105 kg (231 lb 14.8 oz)   08/08/23 106 kg (234 lb 2.1 oz)       Physical Exam  Vitals reviewed. Constitutional:       General: He is not in acute distress. Appearance: He is well-developed. HENT:      Head: Normocephalic and atraumatic. Eyes:      Conjunctiva/sclera: Conjunctivae normal.      Pupils: Pupils are equal, round, and reactive to light. Neck:      Thyroid: No thyromegaly. Cardiovascular:      Rate and Rhythm: Normal rate. Rhythm irregular. Heart sounds: Normal heart sounds. No murmur heard. Pulmonary:      Effort: Pulmonary effort is normal. No respiratory distress. Breath sounds: Normal breath sounds. No wheezing or rales. Abdominal:      General: Bowel sounds are normal. There is no distension. Palpations: Abdomen is soft. Tenderness: There is no abdominal tenderness. Musculoskeletal:         General: Normal range of motion. Cervical back: Normal range of motion and neck supple.    Lymphadenopathy: Cervical: No cervical adenopathy. Skin:     General: Skin is warm and dry. Neurological:      Mental Status: He is alert and oriented to person, place, and time. Labs:   Lab Results   Component Value Date    HGBA1C 6.7 (A) 08/23/2023    HGBA1C 6.6 (A) 05/04/2023    HGBA1C 7.0 (H) 01/10/2023     Lab Results   Component Value Date    CREATININE 1.12 08/08/2023    CREATININE 1.08 07/11/2023    CREATININE 1.03 06/13/2023    BUN 17 08/08/2023     12/29/2015    K 3.9 08/08/2023     08/08/2023    CO2 26 08/08/2023     eGFR   Date Value Ref Range Status   08/08/2023 67 ml/min/1.73sq m Final     Lab Results   Component Value Date    CHOL 122 02/11/2014    HDL 38 (L) 01/10/2023    TRIG 156 (H) 01/10/2023     Lab Results   Component Value Date    ALT 42 08/08/2023    AST 31 08/08/2023    ALKPHOS 40 08/08/2023    BILITOT 0.7 12/29/2015     Lab Results   Component Value Date    APC3YWHXGIHW 2.121 08/08/2023    OKI4NAVGYWSS 0.882 01/10/2023    RAS5DKXXWXZC 0.984 05/03/2022     Lab Results   Component Value Date    FREET4 0.74 08/08/2023             Discussed with the patient and all questioned fully answered. He will call me if any problems arise. Follow-up appointment in 4 months. Counseled patient on diagnostic results, prognosis, risk and benefit of treatment options, instruction for management, importance of treatment compliance, Risk  factor reduction and impressions    There are no Patient Instructions on file for this visit.     Leticia Jessica PA-C

## 2023-08-23 NOTE — ASSESSMENT & PLAN NOTE
BP slightly high today (140/60) and elevated yesterday (156/90) at infusion. Was 125/75 on 8/8/23 medical visit. Continue current regimen and will monitor. He did have irregular heartbeat on exam today. He denies palpitations but reports recent sensation of feeling heartbeat/wooshing in his head. No symptoms at this time and pulse is 80.  Advised him to follow up with his cardiologist for eval.

## 2023-08-28 DIAGNOSIS — Z00.00 PREVENTATIVE HEALTH CARE: ICD-10-CM

## 2023-08-28 RX ORDER — ACYCLOVIR 400 MG/1
TABLET ORAL
Qty: 60 TABLET | Refills: 11 | Status: SHIPPED | OUTPATIENT
Start: 2023-08-28 | End: 2024-08-27

## 2023-09-01 RX ORDER — ACETAMINOPHEN 325 MG/1
650 TABLET ORAL ONCE
Status: CANCELLED | OUTPATIENT
Start: 2023-09-05

## 2023-09-01 RX ORDER — SODIUM CHLORIDE 9 MG/ML
20 INJECTION, SOLUTION INTRAVENOUS ONCE
Status: CANCELLED | OUTPATIENT
Start: 2023-09-05

## 2023-09-03 DIAGNOSIS — J44.9 CHRONIC OBSTRUCTIVE PULMONARY DISEASE, UNSPECIFIED COPD TYPE (HCC): ICD-10-CM

## 2023-09-05 ENCOUNTER — HOSPITAL ENCOUNTER (OUTPATIENT)
Dept: INFUSION CENTER | Facility: CLINIC | Age: 68
Discharge: HOME | End: 2023-09-05
Payer: MEDICARE

## 2023-09-05 VITALS
HEIGHT: 73 IN | RESPIRATION RATE: 18 BRPM | SYSTOLIC BLOOD PRESSURE: 169 MMHG | WEIGHT: 229.83 LBS | HEART RATE: 77 BPM | DIASTOLIC BLOOD PRESSURE: 81 MMHG | BODY MASS INDEX: 30.46 KG/M2 | TEMPERATURE: 98 F

## 2023-09-05 DIAGNOSIS — C91.10 CLL (CHRONIC LYMPHOCYTIC LEUKEMIA) (HCC): Primary | ICD-10-CM

## 2023-09-05 LAB
ALBUMIN SERPL BCP-MCNC: 4.2 G/DL (ref 3.5–5)
ALP SERPL-CCNC: 36 U/L (ref 34–104)
ALT SERPL W P-5'-P-CCNC: 43 U/L (ref 7–52)
ANION GAP SERPL CALCULATED.3IONS-SCNC: 6 MMOL/L
AST SERPL W P-5'-P-CCNC: 36 U/L (ref 13–39)
BASOPHILS # BLD MANUAL: 0.05 THOUSAND/UL (ref 0–0.1)
BASOPHILS NFR MAR MANUAL: 1 % (ref 0–1)
BILIRUB SERPL-MCNC: 0.63 MG/DL (ref 0.2–1)
BUN SERPL-MCNC: 17 MG/DL (ref 5–25)
CALCIUM SERPL-MCNC: 9.2 MG/DL (ref 8.4–10.2)
CHLORIDE SERPL-SCNC: 106 MMOL/L (ref 96–108)
CO2 SERPL-SCNC: 27 MMOL/L (ref 21–32)
CREAT SERPL-MCNC: 1.11 MG/DL (ref 0.6–1.3)
EOSINOPHIL # BLD MANUAL: 0.23 THOUSAND/UL (ref 0–0.4)
EOSINOPHIL NFR BLD MANUAL: 5 % (ref 0–6)
ERYTHROCYTE [DISTWIDTH] IN BLOOD BY AUTOMATED COUNT: 13.5 % (ref 11.6–15.1)
GFR SERPL CREATININE-BSD FRML MDRD: 67 ML/MIN/1.73SQ M
GLUCOSE SERPL-MCNC: 151 MG/DL (ref 65–140)
HCT VFR BLD AUTO: 44.2 % (ref 36.5–49.3)
HGB BLD-MCNC: 14.1 G/DL (ref 12–17)
IGG SERPL-MCNC: 511 MG/DL (ref 635–1741)
LYMPHOCYTES # BLD AUTO: 0.79 THOUSAND/UL (ref 0.6–4.47)
LYMPHOCYTES # BLD AUTO: 17 % (ref 14–44)
MCH RBC QN AUTO: 30.3 PG (ref 26.8–34.3)
MCHC RBC AUTO-ENTMCNC: 31.9 G/DL (ref 31.4–37.4)
MCV RBC AUTO: 95 FL (ref 82–98)
MONOCYTES # BLD AUTO: 0.75 THOUSAND/UL (ref 0–1.22)
MONOCYTES NFR BLD: 16 % (ref 4–12)
NEUTROPHILS # BLD MANUAL: 2.84 THOUSAND/UL (ref 1.85–7.62)
NEUTS SEG NFR BLD AUTO: 61 % (ref 43–75)
PLATELET # BLD AUTO: 148 THOUSANDS/UL (ref 149–390)
PLATELET BLD QL SMEAR: ABNORMAL
PMV BLD AUTO: 11.6 FL (ref 8.9–12.7)
POTASSIUM SERPL-SCNC: 3.7 MMOL/L (ref 3.5–5.3)
PROT SERPL-MCNC: 6.3 G/DL (ref 6.4–8.4)
RBC # BLD AUTO: 4.65 MILLION/UL (ref 3.88–5.62)
RBC MORPH BLD: NORMAL
RETICS # AUTO: NORMAL 10*3/UL (ref 14356–105094)
RETICS # CALC: 1.69 % (ref 0.37–1.87)
SODIUM SERPL-SCNC: 139 MMOL/L (ref 135–147)
WBC # BLD AUTO: 4.66 THOUSAND/UL (ref 4.31–10.16)

## 2023-09-05 PROCEDURE — 80053 COMPREHEN METABOLIC PANEL: CPT

## 2023-09-05 PROCEDURE — 96415 CHEMO IV INFUSION ADDL HR: CPT

## 2023-09-05 PROCEDURE — 96367 TX/PROPH/DG ADDL SEQ IV INF: CPT

## 2023-09-05 PROCEDURE — 85027 COMPLETE CBC AUTOMATED: CPT

## 2023-09-05 PROCEDURE — 96413 CHEMO IV INFUSION 1 HR: CPT

## 2023-09-05 PROCEDURE — 85007 BL SMEAR W/DIFF WBC COUNT: CPT

## 2023-09-05 PROCEDURE — 82784 ASSAY IGA/IGD/IGG/IGM EACH: CPT

## 2023-09-05 PROCEDURE — 85045 AUTOMATED RETICULOCYTE COUNT: CPT | Performed by: INTERNAL MEDICINE

## 2023-09-05 RX ORDER — SODIUM CHLORIDE 9 MG/ML
20 INJECTION, SOLUTION INTRAVENOUS ONCE
Status: COMPLETED | OUTPATIENT
Start: 2023-09-05 | End: 2023-09-05

## 2023-09-05 RX ORDER — ALBUTEROL SULFATE 90 UG/1
2 AEROSOL, METERED RESPIRATORY (INHALATION) EVERY 6 HOURS PRN
Qty: 54 G | Refills: 5 | Status: SHIPPED | OUTPATIENT
Start: 2023-09-05

## 2023-09-05 RX ORDER — ACETAMINOPHEN 325 MG/1
650 TABLET ORAL ONCE
Status: COMPLETED | OUTPATIENT
Start: 2023-09-05 | End: 2023-09-05

## 2023-09-05 RX ADMIN — ACETAMINOPHEN 650 MG: 325 TABLET, FILM COATED ORAL at 09:20

## 2023-09-05 RX ADMIN — OBINUTUZUMAB 1000 MG: 1000 INJECTION, SOLUTION, CONCENTRATE INTRAVENOUS at 09:23

## 2023-09-05 RX ADMIN — DIPHENHYDRAMINE HYDROCHLORIDE 25 MG: 50 INJECTION, SOLUTION INTRAMUSCULAR; INTRAVENOUS at 08:51

## 2023-09-05 RX ADMIN — DEXAMETHASONE SODIUM PHOSPHATE 20 MG: 10 INJECTION, SOLUTION INTRAMUSCULAR; INTRAVENOUS at 08:29

## 2023-09-05 RX ADMIN — SODIUM CHLORIDE 20 ML/HR: 9 INJECTION, SOLUTION INTRAVENOUS at 08:29

## 2023-09-06 ENCOUNTER — OFFICE VISIT (OUTPATIENT)
Dept: HEMATOLOGY ONCOLOGY | Facility: CLINIC | Age: 68
End: 2023-09-06
Payer: MEDICARE

## 2023-09-06 VITALS
HEART RATE: 78 BPM | WEIGHT: 232 LBS | RESPIRATION RATE: 18 BRPM | DIASTOLIC BLOOD PRESSURE: 74 MMHG | TEMPERATURE: 98.1 F | SYSTOLIC BLOOD PRESSURE: 138 MMHG | BODY MASS INDEX: 30.75 KG/M2 | HEIGHT: 73 IN | OXYGEN SATURATION: 98 %

## 2023-09-06 DIAGNOSIS — I10 PRIMARY HYPERTENSION: ICD-10-CM

## 2023-09-06 DIAGNOSIS — G89.3 CANCER RELATED PAIN: ICD-10-CM

## 2023-09-06 DIAGNOSIS — C91.10 CLL (CHRONIC LYMPHOCYTIC LEUKEMIA) (HCC): Primary | ICD-10-CM

## 2023-09-06 DIAGNOSIS — D69.6 THROMBOCYTOPENIA (HCC): ICD-10-CM

## 2023-09-06 DIAGNOSIS — D80.1 HYPOGAMMAGLOBULINEMIA, ACQUIRED (HCC): ICD-10-CM

## 2023-09-06 DIAGNOSIS — D59.10 AUTOIMMUNE HEMOLYTIC ANEMIA (HCC): ICD-10-CM

## 2023-09-06 DIAGNOSIS — I25.10 CORONARY ARTERY DISEASE INVOLVING NATIVE CORONARY ARTERY OF NATIVE HEART WITHOUT ANGINA PECTORIS: ICD-10-CM

## 2023-09-06 DIAGNOSIS — D75.829 HIT (HEPARIN-INDUCED THROMBOCYTOPENIA) (HCC): ICD-10-CM

## 2023-09-06 DIAGNOSIS — J44.9 CHRONIC OBSTRUCTIVE PULMONARY DISEASE, UNSPECIFIED COPD TYPE (HCC): ICD-10-CM

## 2023-09-06 DIAGNOSIS — E11.42 TYPE 2 DIABETES MELLITUS WITH DIABETIC POLYNEUROPATHY, WITH LONG-TERM CURRENT USE OF INSULIN (HCC): ICD-10-CM

## 2023-09-06 DIAGNOSIS — Z79.4 TYPE 2 DIABETES MELLITUS WITH DIABETIC POLYNEUROPATHY, WITH LONG-TERM CURRENT USE OF INSULIN (HCC): ICD-10-CM

## 2023-09-06 PROCEDURE — 99214 OFFICE O/P EST MOD 30 MIN: CPT | Performed by: INTERNAL MEDICINE

## 2023-09-06 NOTE — PATIENT INSTRUCTIONS
Patient has standing orders for blood work and systemic therapy at the infusion center. Follow-up in 3 months.

## 2023-09-06 NOTE — PROGRESS NOTES
HPI: Follow-up visit for stage IV CLL and patient has been on  ibrutinib 140 mg daily, Gazyva once a month and IVIG once a month. Also he is on 5 mg prednisone daily and folic acid. He is on acyclovir. CLL with history of profound anemia, thrombocytopenia and low IgG level. CLL was diagnosed more than 20 years ago and he had multiple lines of therapies and at 1 time his hemoglobin was down to 4.9 because of  autoimmune hemolytic anemia and CLL. 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 He has tiredness, chronic nonproductive cough, exertional dyspnea, occasionally wheezing, arthritic symptoms and for that he is on oxycodone. He has  vascular purpura on forearms. He has chronic lung disease. History of diabetes mellitus, hypertension and coronary artery disease. .  For dysphagia he had EGD and dilatation in September 2020 and he follows with his gastroenterologist.    . . 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   Rafaela Albarado has coronary artery disease and has 1 stent and he takes 81 mg aspirin daily with food.    CLL was diagnosed several years ago. He had been through Fludara based chemotherapy and pentostatin based chemotherapy. He had increased hemolysis when he was on chemotherapy. His most recent chemotherapy treatment was Cytoxan, Oncovin, prednisone and Rituxan and last treatment was in December 2012. . In 2013 he was started on Rituxan, prednisone and folic acid because he started to [facility] again. IVIG was tried unsuccessfully so far as hemolysis is concerned. Rituxan has controlled the hemolysis. ... Information on the treatments from March 2017 onwards. On 3/20/17 patient found to have hemoglobin of 6.2, ,000. He was admitted to [facility] for blood transfusion and plan to transfer to [address] [facility].    On 3/20/17 WBC count 195,000, hemoglobin 4.9, platelet count 65. Direct coomb's was positive with undetectable haptoglobin. He was given 2 units of PRBCs, Solu-Medrol 1 g ×3 days and IVIG 1 g/kg daily ×3 days. His hemoglobin continued to drop. Bone marrow biopsy on 3/21 showed marrow cellularity of greater than 95% almost replaced by small lymphocytes, lambda light chain restricted, CD20 positive, CD19 positive, CD23 positive partially expressing CD11c and CD25 and CD5 positive and negative for CD 79 and CD38. He was treated with single dose of chlorambucil to control his CLL.  3/22 second dose of chlorambucil, also Rituxan 375 mg/M ² autoimmune hemolytic anemia. WBC continued to rise, 266,000. Patient was initiated with Venetoclax 20 mg daily. Tumor lysis monitored every 8 hours; given aggressive hydration and Lasix and remained euvolemic.  Later venetoclax was changed to ibrutinib because of disease progression  Dec 2017- Briana Maser decreased to 140 mg PO daily due to thrombocytopenia . Josep Side added   4/23 - 4/27/18 patient was admitted due to listeria bacteremia. He was discharged on IV ampicillin. Echo negative for vegetations.    He is doing very well on present treatment plan so far as CLL is concerned                         Current Outpatient Medications:   •  acyclovir (ZOVIRAX) 400 MG tablet, TAKE 1 TABLET TWICE A DAY, Disp: 60 tablet, Rfl: 11  •  amLODIPine (NORVASC) 10 mg tablet, Take 1 tablet (10 mg total) by mouth daily, Disp: 90 tablet, Rfl: 1  •  aspirin 81 MG tablet, Take 81 mg by mouth daily Every other day, Disp: , Rfl:   •  Blood Glucose Monitoring Suppl (FreeStyle Freedom Lite) w/Device KIT, Use 3 (three) times a day, Disp: 1 kit, Rfl: 0  •  citalopram (CeleXA) 40 mg tablet, TAKE 1 TABLET DAILY, Disp: 90 tablet, Rfl: 1  •  Continuous Blood Gluc  (FreeStyle Ashley 2 Austin) JOE, Use to scan sensor premeals and at bedtime, Disp: 1 each, Rfl: 1  •  Continuous Blood Gluc Sensor (FreeStyle Ashley 2 Sensor) [facility], Apply 1 sensor every 14 days. Use as directed with Freestyle Ashley 2 reader., Disp: 2 each, Rfl: 2  •  Diclofenac Sodium (VOLTAREN) 1 %, Apply 4 g topically 4 (four) times a day, Disp: 100 g, Rfl: 0  •  fenofibrate (TRICOR) 145 mg tablet, TAKE 1 TABLET DAILY, Disp: 90 tablet, Rfl: 5  •  fluticasone (FLONASE) 50 mcg/act nasal spray, 2 sprays into each nostril daily, Disp: 16 g, Rfl: 0  •  folic acid (FOLVITE) 1 mg tablet, Take 800 mcg by mouth daily , Disp: , Rfl:   •  gabapentin (NEURONTIN) 600 MG tablet, TAKE 1 TABLET DAILY, Disp: 90 tablet, Rfl: 1  •  glucose blood (FREESTYLE LITE) test strip, Check blood sugar 3 times a day, Disp: 300 strip, Rfl: 1  •  insulin degludec Candy Do FlexTouch) 100 units/mL injection pen, Inject 18 Units under the skin daily at bedtime, Disp: 15 mL, Rfl: 3  •  insulin lispro (Admelog SoloStar) 100 units/mL injection pen, 12-14 units before each meal, Disp: 15 mL, Rfl: 5  •  Insulin Pen Needle (BD Pen Needle Inez U/F) 32G X 4 MM MISC, Use 3 (three) times a day, Disp: 300 each, Rfl: 0  •  Lancets (freestyle) lancets, TEST BLOOD SUGAR 3 TIMES A DAY, Disp: 300 each, Rfl: 1  •  LORazepam (ATIVAN) 0.5 mg tablet, Take 1 tablet (0.5 mg total) by mouth daily as needed for anxiety, Disp: 30 tablet, Rfl: 0  •  losartan (COZAAR) 100 MG tablet, TAKE 1 TABLET DAILY, Disp: 90 tablet, Rfl: 1  •  mometasone-formoterol (Dulera) 200-5 MCG/ACT inhaler, Inhale 2 puffs 2 (two) times a day Rinse mouth after use., Disp: 13 g, Rfl: 1  •  multivitamin (THERAGRAN) TABS, Take 1 tablet by mouth daily, Disp: , Rfl:   •  naloxone (NARCAN) 4 mg/0.1 mL nasal spray, Administer 1 spray into a nostril.  If no response after 2-3 minutes, give another dose in the other nostril using a new spray., Disp: 1 each, Rfl: 1  •  obinutuzumab (GAZYVA) 1000 mg/40 mL SOLN, Infuse into a venous catheter, Disp: , Rfl:   •  oxyCODONE (ROXICODONE) 10 MG TABS, Take 1 tablet (10 mg total) by mouth every 6 (six) hours as needed for moderate pain For ongoing pain control Max Daily Amount: 40 mg, Disp: 28 tablet, Rfl: 0  •  pantoprazole (PROTONIX) 40 mg tablet, Take 1 tablet (40 mg total) by mouth 2 (two) times a day, Disp: 180 tablet, Rfl: 3  •  predniSONE 5 mg tablet, TAKE 1 TABLET DAILY, Disp: 90 tablet, Rfl: 5  •  Ventolin  (90 Base) MCG/ACT inhaler, USE 2 INHALATIONS EVERY 6 HOURS AS NEEDED FOR WHEEZING, Disp: 54 g, Rfl: 5  •  zolpidem (AMBIEN) 5 mg tablet, Take 1 tablet (5 mg total) by mouth daily at bedtime as needed for sleep, Disp: 30 tablet, Rfl: 0  •  albuterol (2.5 mg/3 mL) 0.083 % nebulizer solution, Take 3 mL (2.5 mg total) by nebulization every 6 (six) hours as needed for wheezing or shortness of breath (Patient not taking: Reported on 6/9/2023), Disp: 180 mL, Rfl: 5  •  benzonatate (TESSALON) 200 MG capsule, TAKE 1 CAPSULE THREE TIMES A DAY AS NEEDED FOR COUGH (Patient not taking: Reported on 5/4/2023), Disp: 20 capsule, Rfl: 51  •  Ibrutinib 140 MG TABS, Take 140 mg by mouth daily, Disp: 30 tablet, Rfl: 6  •  Lancets (FREESTYLE) lancets, Use as instructed--test 2 times a day  E 11.9 (Patient not taking: Reported on 8/23/2023), Disp: 100 each, Rfl: 0  •  naloxone (NARCAN) 4 mg/0.1 mL nasal spray, Administer 1 spray into a nostril. If no response after 2-3 minutes, give another dose in the other nostril using a new spray. (Patient not taking: Reported on 8/23/2023), Disp: 1 each, Rfl: 1  •  sodium chloride, PF, 0.9 %, 10 mL by Intracatheter route see administration instructions 10mL into port before and after ampicillin doses (Patient not taking: Reported on 5/4/2023), Disp: , Rfl: 0  No current facility-administered medications for this visit.     Allergies   Allergen Reactions   • Heparin Other (See Comments)     Other reaction(s): Blood Disorders  clot   • Macrolides And Ketolides Other (See Comments)     Interacts with other meds he is taking   • Simvastatin Myalgia       Oncology History   CLL (chronic lymphocytic leukemia) ([facility])   8/22/2017 -  Chemotherapy    chlorambucil (LEUKERAN), 0.5 mg/kg, Oral, Every 14 days, 6 of 6 cycles  obinutuzumab (GAZYVA) day 1 IVPB, 100 mg, Intravenous, Once, 1 of 1 cycle  obinutuzumab (GAZYVA) day 2 titrated infusion, 900 mg, Intravenous, Once, 1 of 1 cycle  obinutuzumab (GAZYVA) subsequent titrated infusion, 1,000 mg, Intravenous, Once, 61 of 66 cycles  Administration: 1,000 mg (5/21/2019), 1,000 mg (6/18/2019), 1,000 mg (7/16/2019), 1,000 mg (8/13/2019), 1,000 mg (9/10/2019), 1,000 mg (10/8/2019), 1,000 mg (11/5/2019), 1,000 mg (12/3/2019), 1,000 mg (12/31/2019), 1,000 mg (1/28/2020), 1,000 mg (2/25/2020), 1,000 mg (3/24/2020), 1,000 mg (4/21/2020), 1,000 mg (5/19/2020), 1,000 mg (6/16/2020), 1,000 mg (7/14/2020), 1,000 mg (8/11/2020), 1,000 mg (9/9/2020), 1,000 mg (10/6/2020), 1,000 mg (11/3/2020), 1,000 mg (12/1/2020), 1,000 mg (1/26/2021), 1,000 mg (12/29/2020), 1,000 mg (2/23/2021), 1,000 mg (3/23/2021), 1,000 mg (4/20/2021), 1,000 mg (5/18/2021), 1,000 mg (6/15/2021), 1,000 mg (7/13/2021), 1,000 mg (8/10/2021), 1,000 mg (9/7/2021), 1,000 mg (10/5/2021), 1,000 mg (11/2/2021), 1,000 mg (11/30/2021), 1,000 mg (12/28/2021), 1,000 mg (1/25/2022), 1,000 mg (2/22/2022), 1,000 mg (3/22/2022), 1,000 mg (5/17/2022), 1,000 mg (6/14/2022), 1,000 mg (7/12/2022), 1,000 mg (8/9/2022), 1,000 mg (9/6/2022), 1,000 mg (10/4/2022), 1,000 mg (11/1/2022), 1,000 mg (11/29/2022), 1,000 mg (1/24/2023), 1,000 mg (2/21/2023), 1,000 mg (3/21/2023), 1,000 mg (4/18/2023), 1,000 mg (5/16/2023), 1,000 mg (6/13/2023), 1,000 mg (7/11/2023), 1,000 mg (8/8/2023), 1,000 mg (9/5/2023)     4/24/2018 Initial Diagnosis    CLL (chronic lymphocytic leukemia) ([address])         ROS:  09/06/23 Reviewed 13 systems: See symptoms in HPI  Presently no other neurological, cardiac, pulmonary, GI and  symptoms other than listed in HPI. Other symptoms are in HPI.   No fever, chills, bleeding, bone pains, skin rash, weight loss,  numbness, claudication and gait problem. No frequent infections but had them before. Not unusually sensitive to heat or cold. No swelling of the ankles. No swollen glands. Patient is anxious. Has vascular purpura on the arms. /74 (BP Location: Right arm, Patient Position: Sitting, Cuff Size: Adult)   Pulse 78   Temp 98.1 °F (36.7 °C) (Temporal)   Resp 18   Ht 6' 0.99" (1.854 m)   Wt 105 kg (232 lb)   SpO2 98%   BMI 30.62 kg/m²     Physical Exam:  Alert and oriented and not in distress. Vitals are above. No icterus. No oral thrush. No palpable neck mass. Clear lung fields. Regular heart rate. Abdomen is soft and nontender. No palpable abdominal mass. No ascites. No edema of ankles. No calf tenderness. No focal neurological deficit. No skin rash other than vascular purpura both forearms and backs of hands. No palpable lymphadenopathy in the neck and axillary areas. No clubbing. Patient is anxious. Performance status 2.       IMAGING:    .   Labs, Imaging, & Other studies:   Units 9/5/23  8:22 AM 8/8/23  8:49 AM 7/11/23  8:26 AM 6/13/23  8:27 AM 5/16/23  8:22 AM 4/18/23  8:27 AM 3/21/23  8:28 AM    WBC 4.31 - 10.16 Thousand/uL 4.66  5.00  5.48  4.82  4.79  4.21 Low   4.60    RBC 3.88 - 5.62 Million/uL 4.65  4.62  4.67  4.52  4.58  4.58  4.70    Hemoglobin 12.0 - 17.0 g/dL 14.1  14.0  14.3  14.0  14.2  14.2  14.8    Hematocrit 36.5 - 49.3 % 44.2  43.9  43.8  42.9  44.1  43.4  44.4    MCV 82 - 98 fL 95  95  94  95  96  95  95    MCH 26.8 - 34.3 pg 30.3  30.3  30.6  31.0  31.0  31.0  31.5    MCHC 31.4 - 37.4 g/dL 31.9  31.9  32.6  32.6  32.2  32.7  33.3    RDW 11.6 - 15.1 % 13.5  13.7  13.2  13.4  13.6  13.6  13.4    MPV 8.9 - 12.7 fL 11.6  11.2  11.5  11.5  11.5  11.5  11.5    Platelets 709 - 892 Thousands/uL 148 Low   148 Low   153  169  156  159  151    nRBC     0 R       Lymphocytes Absolute     0.76 R       Monocytes Absolute     0.69 R       Eosinophils Absolute     0.23 R Basophils Absolute     0.05 R       Neutrophils Relative     62 R       Immat GRANS %     2 R       Lymphocytes Relative     16 R       Monocytes Relative     14 High  R       Eosinophils Relative     5 R       Basophils Relative     1 R       Neutrophils Absolute     2.98 R       Immature Grans Absolute     0.11 R                       Units 9/5/23  8:22 AM 8/8/23  8:49 AM 7/11/23  8:26 AM 6/13/23  8:27 AM 5/16/23  8:22 AM 4/18/23  8:27 AM 3/21/23  8:28 AM    Sodium 135 - 147 mmol/L 139  139  136  138  139  141  142    Potassium 3.5 - 5.3 mmol/L 3.7  3.9  3.7  3.9  3.8  3.8  3.8    Chloride 96 - 108 mmol/L 106  108  105  107  108  109 High   107    CO2 21 - 32 mmol/L 27  26  26  26  26  26  28    ANION GAP mmol/L 6  5  5  5 R  5 R  6 R  7 R    BUN 5 - 25 mg/dL 17  17  16  22  22  19  16    Creatinine 0.60 - 1.30 mg/dL 1.11  1.12 CM  1.08 CM  1.03 CM  1.08 CM  1.13 CM  1.06 CM    Comment: Standardized to IDMS reference method   Glucose 65 - 140 mg/dL 151 High   140 CM  145 High  CM  152 High  CM  150 High  CM  177 High  CM  87 CM    Comment: If the patient is fasting, the ADA then defines impaired fasting glucose as > 100 mg/dL and diabetes as > or equal to 123 mg/dL. Calcium 8.4 - 10.2 mg/dL 9.2  8.9  9.4  8.9  9.3  9.4  9.2    AST 13 - 39 U/L 36  31  25  28  29  28  33 CM    ALT 7 - 52 U/L 43  42 CM  34 CM  34 CM  34 CM  37 CM  44 CM    Comment: Specimen collection should occur prior to Sulfasalazine administration due to the potential for falsely depressed results.     Alkaline Phosphatase 34 - 104 U/L 36  40  47  43  49  43  42    Total Protein 6.4 - 8.4 g/dL 6.3 Low   6.2 Low   6.4  6.2 Low   6.2 Low   6.2 Low   6.1 Low     Albumin 3.5 - 5.0 g/dL 4.2  4.2  4.2  4.1  4.2  4.0  4.2    Total Bilirubin 0.20 - 1.00 mg/dL 0.63  0.43 CM  0.46 CM  0.48 CM  0.49 CM  0.41 CM  0.62    Comment: Use of this assay is not recommended for patients undergoing treatment with eltrombopag due to the potential for falsely elevated results. Component Ref Range & Units 9/5/23  8:22 AM 8/8/23  8:49 AM 7/11/23  8:26 AM 6/13/23  8:27 AM 5/16/23  8:22 AM 4/18/23  8:27 AM 3/21/23  8:28 AM    - 1,741 mg/dL 511 Low   516.0 Low  R  546.0 Low  R  537.0 Low  R  555.0 Low  R  553.0 Low  R  555.0 Low  R         9/5/23  8:22 AM 8/8/23  8:49 AM 7/11/23  8:26 AM 6/13/23  8:27 AM 5/16/23  8:22 AM 4/18/23  8:27 AM 3/21/23  8:28 AM     Retic Ct Abs 14,356 - 105,094 78,600  84,100  82,700  90,400  80,600  93,400  99,200    Retic Ct Pct 0.37 - 1.87 % 1.69  1.82  1.77  2.00 High   1.76  2.04 High   2.11 High                                Component Ref Range & Units 9/5/23  8:22 AM 8/8/23  8:49 AM 7/11/23  8:26 AM 6/13/23  8:27 AM 5/16/23  8:22 AM 4/18/23  8:27 AM 3/21/23  8:28 AM   Segmented % 43 - 75 % 61  53  63   61  64  60    Lymphocytes % 14 - 44 % 17  26  16   19  12 Low   22    Monocytes % 4 - 12 % 16 High   13 High   16 High    16 High   15 High   11    Eosinophils, % 0 - 6 % 5  7 High   4  5  3  8 High   5    Basophils % 0 - 1 % 1  1  0  1  0  0  0    Absolute Neutrophils 1.85 - 7.62 Thousand/uL 2.84  2.65  3.51   2.97  2.74  2.76    Lymphocytes Absolute 0.60 - 4.47 Thousand/uL 0.79  1.30  0.88   0.91  0.51 Low   1.01    Monocytes Absolute 0.00 - 1.22 Thousand/uL 0.75  0.65  0.88   0.77  0.63  0.51    Eosinophils Absolute 0.00 - 0.40 Thousand/uL 0.23  0.35  0.22  0.23 R  0.14  0.34  0.23    Basophils Absolute 0.00 - 0.10 Thousand/uL 0.05  0.05  0.00  0.05 R  0.00  0.00  0.00    Total Counted                RBC Morphology  Normal  Normal  Normal   Normal  Normal     Platelet Estimate Adequate Borderline Abnormal   Borderline Abnormal   Adequate   Adequate  Adequate  Adequate               Specimen Collected: 09/05/23  8:22 AM Last Resulted: 09/05/23 12:45 PM                 All pertinent labs and imaging studies were personally reviewed      Reviewed test results and discussed with patient.   .  Assessment and plan: Follow-up visit for stage IV CLL and patient has been on  ibrutinib 140 mg daily, Gazyva once a month and IVIG once a month. Also he is on 5 mg prednisone daily and folic acid. He is on acyclovir. CLL with history of profound anemia, thrombocytopenia and low IgG level. CLL was diagnosed more than 20 years ago and he had multiple lines of therapies and at 1 time his hemoglobin was down to 4.9 because of  autoimmune hemolytic anemia and CLL. 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 He has tiredness, chronic nonproductive cough, exertional dyspnea, occasionally wheezing, arthritic symptoms and for that he is on oxycodone. He has  vascular purpura on forearms. He has chronic lung disease. History of diabetes mellitus, hypertension and coronary artery disease. .  For dysphagia he had EGD and dilatation in September 2020 and he follows with his gastroenterologist.    . . 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   Josh Washington has coronary artery disease and has 1 stent and he takes 81 mg aspirin daily with food.    CLL was diagnosed several years ago. He had been through Fludara based chemotherapy and pentostatin based chemotherapy. He had increased hemolysis when he was on chemotherapy. His most recent chemotherapy treatment was Cytoxan, Oncovin, prednisone and Rituxan and last treatment was in December 2012. . In 2013 he was started on Rituxan, prednisone and folic acid because he started to [facility] again. IVIG was tried unsuccessfully so far as hemolysis is concerned. Rituxan has controlled the hemolysis. ... Information on the treatments from March 2017 onwards. On 3/20/17 patient found to have hemoglobin of 6.2, ,000. He was admitted to [facility] for blood transfusion and plan to transfer to [address] [facility].    On 3/20/17 WBC count 195,000, hemoglobin 4.9, platelet count 65. Direct coomb's was positive with undetectable haptoglobin. He was given 2 units of PRBCs, Solu-Medrol 1 g ×3 days and IVIG 1 g/kg daily ×3 days. His hemoglobin continued to drop. Bone marrow biopsy on 3/21 showed marrow cellularity of greater than 95% almost replaced by small lymphocytes, lambda light chain restricted, CD20 positive, CD19 positive, CD23 positive partially expressing CD11c and CD25 and CD5 positive and negative for CD 79 and CD38. He was treated with single dose of chlorambucil to control his CLL.  3/22 second dose of chlorambucil, also Rituxan 375 mg/M ² autoimmune hemolytic anemia. WBC continued to rise, 266,000. Patient was initiated with Venetoclax 20 mg daily. Tumor lysis monitored every 8 hours; given aggressive hydration and Lasix and remained euvolemic.  Later venetoclax was changed to ibrutinib because of disease progression  Dec 2017- Maddie Floss decreased to 140 mg PO daily due to thrombocytopenia . Susan Zuniga added   4/23 - 4/27/18 patient was admitted due to listeria bacteremia. He was discharged on IV ampicillin. Echo negative for vegetations. He is doing very well on present treatment plan so far as CLL is concerned        Physical examination and test results are as recorded and discussed in very much detail. Hematologically stable. No change in therapy. All discussed in detail. Questions answered. Renewed oxycodone. Also prescribed Narcan  Discussed the importance of eating healthy foods, staying active and health screening tests. Patient to avoid trauma and falls. Goal is prolongation of survival and prevention of complications. Patient is capable of self-care. Discussed precautions against coronavirus and other infections. Patient is immunocompromised . Kelly Hilton See diagnoses, orders and instructions  1. CLL (chronic lymphocytic leukemia) (HCC)    - CBC and differential; Future    2. Autoimmune hemolytic anemia (HCC)      3.  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[address])      4. Thrombocytopenia ([address])      5. Hypogammaglobulinemia, acquired ([address])      6. Chronic obstructive pulmonary disease, unspecified COPD type ([address])      7. Type 2 diabetes mellitus with diabetic polyneuropathy, with long-term current use of insulin ([address])      8. Coronary artery disease involving native coronary artery of native heart without angina pectoris      9. Primary hypertension      10. Cancer related pain      Patient has standing orders for blood work and systemic therapy at the infusion center. Follow-up in 3 months.                   .  Patient will continue to follow with his primary physician and other consultants      I used dictation device to dictate this note and there could be mistakes in my note and for that he may call my office      Patient voiced understanding and agreed      Counseling / Coordination of Care  .   Provided counseling and support

## 2023-09-11 DIAGNOSIS — G89.3 CANCER RELATED PAIN: ICD-10-CM

## 2023-09-11 RX ORDER — OXYCODONE HYDROCHLORIDE 10 MG/1
10 TABLET ORAL EVERY 6 HOURS PRN
Qty: 28 TABLET | Refills: 0 | Status: SHIPPED | OUTPATIENT
Start: 2023-09-11

## 2023-09-19 ENCOUNTER — HOSPITAL ENCOUNTER (OUTPATIENT)
Dept: INFUSION CENTER | Facility: CLINIC | Age: 68
Discharge: HOME | End: 2023-09-19
Payer: MEDICARE

## 2023-09-19 VITALS
WEIGHT: 231.48 LBS | SYSTOLIC BLOOD PRESSURE: 168 MMHG | DIASTOLIC BLOOD PRESSURE: 82 MMHG | TEMPERATURE: 98.3 F | BODY MASS INDEX: 30.55 KG/M2 | HEART RATE: 73 BPM | RESPIRATION RATE: 18 BRPM

## 2023-09-19 DIAGNOSIS — C91.10 CLL (CHRONIC LYMPHOCYTIC LEUKEMIA) (HCC): ICD-10-CM

## 2023-09-19 DIAGNOSIS — C91.10 CLL (CHRONIC LYMPHOCYTIC LEUKEMIA) (HCC): Primary | ICD-10-CM

## 2023-09-19 DIAGNOSIS — D80.1 HYPOGAMMAGLOBULINEMIA, ACQUIRED (HCC): ICD-10-CM

## 2023-09-19 PROCEDURE — 96366 THER/PROPH/DIAG IV INF ADDON: CPT

## 2023-09-19 PROCEDURE — 96365 THER/PROPH/DIAG IV INF INIT: CPT

## 2023-09-19 PROCEDURE — 96367 TX/PROPH/DG ADDL SEQ IV INF: CPT

## 2023-09-19 RX ORDER — ACETAMINOPHEN 325 MG/1
650 TABLET ORAL ONCE
OUTPATIENT
Start: 2023-10-17

## 2023-09-19 RX ORDER — SODIUM CHLORIDE 9 MG/ML
20 INJECTION, SOLUTION INTRAVENOUS ONCE
OUTPATIENT
Start: 2023-10-17

## 2023-09-19 RX ORDER — SODIUM CHLORIDE 9 MG/ML
20 INJECTION, SOLUTION INTRAVENOUS ONCE
Status: COMPLETED | OUTPATIENT
Start: 2023-09-19 | End: 2023-09-19

## 2023-09-19 RX ORDER — ACETAMINOPHEN 325 MG/1
650 TABLET ORAL ONCE
Status: COMPLETED | OUTPATIENT
Start: 2023-09-19 | End: 2023-09-19

## 2023-09-19 RX ADMIN — SODIUM CHLORIDE 20 ML/HR: 0.9 INJECTION, SOLUTION INTRAVENOUS at 08:22

## 2023-09-19 RX ADMIN — DEXAMETHASONE SODIUM PHOSPHATE 4 MG: 10 INJECTION, SOLUTION INTRAMUSCULAR; INTRAVENOUS at 08:26

## 2023-09-19 RX ADMIN — Medication 20 G: at 09:33

## 2023-09-19 RX ADMIN — ACETAMINOPHEN 650 MG: 325 TABLET ORAL at 08:24

## 2023-09-19 RX ADMIN — DIPHENHYDRAMINE HYDROCHLORIDE 12.5 MG: 50 INJECTION, SOLUTION INTRAMUSCULAR; INTRAVENOUS at 08:51

## 2023-09-19 NOTE — PROGRESS NOTES
Pt arrived to unit without complaint. Pt tolerated IVIG without incident. AVS declined, but aware of future appts. Pt left unit in stable condition.

## 2023-09-20 RX ORDER — PREDNISONE 5 MG/1
TABLET ORAL
Qty: 90 TABLET | Refills: 5 | Status: SHIPPED | OUTPATIENT
Start: 2023-09-20

## 2023-09-22 ENCOUNTER — RA CDI HCC (OUTPATIENT)
Dept: OTHER | Facility: HOSPITAL | Age: 68
End: 2023-09-22

## 2023-09-22 NOTE — PROGRESS NOTES
[address]  coding opportunities     E11.22     Chart Reviewed number of suggestions sent to Provider: 1     Patients Insurance     Medicare Insurance: Medicare        [address]  coding opportunities          Chart Reviewed number of suggestions sent to Provider: 1     Patients Insurance     Medicare Insurance: [employer]

## 2023-09-26 DIAGNOSIS — E09.9 STEROID-INDUCED DIABETES MELLITUS, SEQUELA: ICD-10-CM

## 2023-09-26 DIAGNOSIS — T38.0X5S STEROID-INDUCED DIABETES MELLITUS, SEQUELA: ICD-10-CM

## 2023-09-26 RX ORDER — INSULIN LISPRO 100 [IU]/ML
INJECTION, SOLUTION INTRAVENOUS; SUBCUTANEOUS
Qty: 15 ML | Refills: 0 | Status: SHIPPED | OUTPATIENT
Start: 2023-09-26

## 2023-09-28 ENCOUNTER — OFFICE VISIT (OUTPATIENT)
Dept: FAMILY MEDICINE CLINIC | Facility: CLINIC | Age: 68
End: 2023-09-28
Payer: MEDICARE

## 2023-09-28 VITALS
DIASTOLIC BLOOD PRESSURE: 74 MMHG | SYSTOLIC BLOOD PRESSURE: 140 MMHG | HEART RATE: 73 BPM | OXYGEN SATURATION: 97 % | BODY MASS INDEX: 31.17 KG/M2 | HEIGHT: 73 IN | TEMPERATURE: 98.7 F | WEIGHT: 235.2 LBS

## 2023-09-28 DIAGNOSIS — E11.42 TYPE 2 DIABETES MELLITUS WITH DIABETIC POLYNEUROPATHY, WITH LONG-TERM CURRENT USE OF INSULIN (HCC): Primary | ICD-10-CM

## 2023-09-28 DIAGNOSIS — J44.9 CHRONIC OBSTRUCTIVE PULMONARY DISEASE, UNSPECIFIED COPD TYPE (HCC): ICD-10-CM

## 2023-09-28 DIAGNOSIS — F41.9 ANXIETY DISORDER, UNSPECIFIED TYPE: ICD-10-CM

## 2023-09-28 DIAGNOSIS — C91.10 CLL (CHRONIC LYMPHOCYTIC LEUKEMIA) (HCC): ICD-10-CM

## 2023-09-28 DIAGNOSIS — Z79.4 TYPE 2 DIABETES MELLITUS WITH DIABETIC POLYNEUROPATHY, WITH LONG-TERM CURRENT USE OF INSULIN (HCC): Primary | ICD-10-CM

## 2023-09-28 DIAGNOSIS — K21.00 GASTROESOPHAGEAL REFLUX DISEASE WITH ESOPHAGITIS, UNSPECIFIED WHETHER HEMORRHAGE: ICD-10-CM

## 2023-09-28 DIAGNOSIS — E78.2 MIXED HYPERLIPIDEMIA: ICD-10-CM

## 2023-09-28 DIAGNOSIS — I10 PRIMARY HYPERTENSION: ICD-10-CM

## 2023-09-28 PROCEDURE — 99214 OFFICE O/P EST MOD 30 MIN: CPT | Performed by: FAMILY MEDICINE

## 2023-09-28 RX ORDER — LORAZEPAM 0.5 MG/1
0.5 TABLET ORAL DAILY PRN
Qty: 30 TABLET | Refills: 0 | Status: SHIPPED | OUTPATIENT
Start: 2023-09-28

## 2023-09-28 NOTE — PROGRESS NOTES
Assessment/Plan:   1. Type 2 diabetes mellitus with diabetic polyneuropathy, with long-term current use of insulin [facility])  She is following with endocrinology. A1c has been stable. Continue with a strict diabetic diet and exercise plan. Continues well with his current treatment of Tresiba. 2. CLL (chronic lymphocytic leukemia) ([address])  Following with hematology/oncology regularly. Continue with his current infusions as well as his daily treatment of Ibrutinib. 3. Primary hypertension  Stable today. Continue with routine home monitoring as well as current treatment of amlodipine. As well as his losartan    4. Mixed hyperlipidemia  Recheck lipid panel in near future. Continue with a strict low-fat/low-carb diet as well as current treatment with fenofibrate    5. Chronic obstructive pulmonary disease, unspecified COPD type ([address])  Stable. He denies any recent exacerbations. Continue with his current treatment of Ventolin versus Dulera    6. Anxiety disorder, unspecified type  Stable. He unfortunately has had multiple friends and family deaths over the past 2 years. He states overall he has been doing well. Continue with his current treatment of lorazepam as well as escitalopram.  PDMP reviewed, no abnormality seen today. - LORazepam (ATIVAN) 0.5 mg tablet; Take 1 tablet (0.5 mg total) by mouth daily as needed for anxiety  Dispense: 30 tablet; Refill: 0    7. Gastroesophageal reflux disease with esophagitis, unspecified whether hemorrhage  Able. Continue with his dietary trigger avoidance as well as his current treatment with pantoprazole.            Diagnoses and all orders for this visit:    Type 2 diabetes mellitus with diabetic polyneuropathy, with long-term current use of insulin (HCC)    CLL (chronic lymphocytic leukemia) (HCC)    Primary hypertension    Mixed hyperlipidemia    Chronic obstructive pulmonary disease, unspecified COPD type (HCC)    Anxiety disorder, unspecified type  -     LORazepam (ATIVAN) 0.5 mg tablet; Take 1 tablet (0.5 mg total) by mouth daily as needed for anxiety    Gastroesophageal reflux disease with esophagitis, unspecified whether hemorrhage          Subjective:       Chief Complaint   Patient presents with   • Follow-up     6 month follow up      Patient ID: Aletha Clarke is a 70 y.o. male presents today for a follow-up on his chronic conditions. He has type 2 diabetes, CLL, hypertension, dyslipidemia, COPD, generalized anxiety as well as GERD. He has been taking his medications regularly. He denies adverse reactions with his medications. HPI    Review of Systems   Constitutional: Negative for activity change, chills, fatigue and fever. HENT: Negative for congestion, ear pain, sinus pressure and sore throat. Eyes: Negative for redness, itching and visual disturbance. Respiratory: Negative for cough and shortness of breath. Cardiovascular: Negative for chest pain and palpitations. Gastrointestinal: Negative for abdominal pain, diarrhea and nausea. Endocrine: Negative for cold intolerance and heat intolerance. Genitourinary: Negative for dysuria, flank pain and frequency. Musculoskeletal: Negative for arthralgias, back pain, gait problem and myalgias. Skin: Negative for color change. Allergic/Immunologic: Negative for environmental allergies. Neurological: Negative for dizziness, numbness and headaches. Psychiatric/Behavioral: Negative for behavioral problems and sleep disturbance. The following portions of the patient's history were reviewed and updated as appropriate : past family history, past medical history, past social history and past surgical history.     Current Outpatient Medications:   •  acyclovir (ZOVIRAX) 400 MG tablet, TAKE 1 TABLET TWICE A DAY, Disp: 60 tablet, Rfl: 11  •  albuterol (2.5 mg/3 mL) 0.083 % nebulizer solution, Take 3 mL (2.5 mg total) by nebulization every 6 (six) hours as needed for wheezing or shortness of breath, Disp: 180 mL, Rfl: 5  •  amLODIPine (NORVASC) 10 mg tablet, Take 1 tablet (10 mg total) by mouth daily, Disp: 90 tablet, Rfl: 1  •  aspirin 81 MG tablet, Take 81 mg by mouth daily Every other day, Disp: , Rfl:   •  Blood Glucose Monitoring Suppl (FreeStyle Freedom Lite) w/Device KIT, Use 3 (three) times a day, Disp: 1 kit, Rfl: 0  •  citalopram (CeleXA) 40 mg tablet, TAKE 1 TABLET DAILY, Disp: 90 tablet, Rfl: 1  •  Continuous Blood Gluc  (FreeStyle [place] 2 [place]) JOE, Use to scan sensor premeals and at bedtime, Disp: 1 each, Rfl: 1  •  Continuous Blood Gluc Sensor (FreeStyle Ashley 2 Sensor) MISC, Apply 1 sensor every 14 days.  Use as directed with Freestyle Ashley 2 reader., Disp: 2 each, Rfl: 2  •  Diclofenac Sodium (VOLTAREN) 1 %, Apply 4 g topically 4 (four) times a day, Disp: 100 g, Rfl: 0  •  fenofibrate (TRICOR) 145 mg tablet, TAKE 1 TABLET DAILY, Disp: 90 tablet, Rfl: 5  •  fluticasone (FLONASE) 50 mcg/act nasal spray, 2 sprays into each nostril daily, Disp: 16 g, Rfl: 0  •  folic acid (FOLVITE) 1 mg tablet, Take 800 mcg by mouth daily , Disp: , Rfl:   •  gabapentin (NEURONTIN) 600 MG tablet, TAKE 1 TABLET DAILY, Disp: 90 tablet, Rfl: 1  •  glucose blood (FREESTYLE LITE) test strip, Check blood sugar 3 times a day, Disp: 300 strip, Rfl: 1  •  Ibrutinib 140 MG TABS, Take 140 mg by mouth daily, Disp: 30 tablet, Rfl: 6  •  insulin degludec [place] FlexTouch) 100 units/mL injection pen, Inject 18 Units under the skin daily at bedtime, Disp: 15 mL, Rfl: 3  •  insulin lispro (Admelog SoloStar) 100 units/mL injection pen, 12-14 units before each meal, Disp: 15 mL, Rfl: 0  •  Insulin Pen Needle (BD Pen Needle [place]ez U/F) 32G X 4 MM MISC, Use 3 (three) times a day, Disp: 300 each, Rfl: 0  •  Lancets (freestyle) lancets, TEST BLOOD SUGAR 3 TIMES A DAY, Disp: 300 each, Rfl: 1  •  LORazepam (ATIVAN) 0.5 mg tablet, Take 1 tablet (0.5 mg total) by mouth daily as needed for anxiety, Disp: 30 tablet, Rfl: 0  • losartan (COZAAR) 100 MG tablet, TAKE 1 TABLET DAILY, Disp: 90 tablet, Rfl: 1  •  mometasone-formoterol (Dulera) 200-5 MCG/ACT inhaler, Inhale 2 puffs 2 (two) times a day Rinse mouth after use., Disp: 13 g, Rfl: 1  •  multivitamin (THERAGRAN) TABS, Take 1 tablet by mouth daily, Disp: , Rfl:   •  obinutuzumab (GAZYVA) 1000 mg/40 mL SOLN, Infuse into a venous catheter, Disp: , Rfl:   •  oxyCODONE (ROXICODONE) 10 MG TABS, Take 1 tablet (10 mg total) by mouth every 6 (six) hours as needed for moderate pain For ongoing pain control Max Daily Amount: 40 mg, Disp: 28 tablet, Rfl: 0  •  pantoprazole (PROTONIX) 40 mg tablet, Take 1 tablet (40 mg total) by mouth 2 (two) times a day, Disp: 180 tablet, Rfl: 3  •  predniSONE 5 mg tablet, TAKE 1 TABLET DAILY, Disp: 90 tablet, Rfl: 5  •  Ventolin  (90 Base) MCG/ACT inhaler, USE 2 INHALATIONS EVERY 6 HOURS AS NEEDED FOR WHEEZING, Disp: 54 g, Rfl: 5  •  benzonatate (TESSALON) 200 MG capsule, TAKE 1 CAPSULE THREE TIMES A DAY AS NEEDED FOR COUGH (Patient not taking: Reported on 5/4/2023), Disp: 20 capsule, Rfl: 51  •  Lancets (FREESTYLE) lancets, Use as instructed--test 2 times a day  E 11.9 (Patient not taking: Reported on 8/23/2023), Disp: 100 each, Rfl: 0  •  naloxone (NARCAN) 4 mg/0.1 mL nasal spray, Administer 1 spray into a nostril. If no response after 2-3 minutes, give another dose in the other nostril using a new spray. (Patient not taking: Reported on 9/28/2023), Disp: 1 each, Rfl: 1  •  naloxone (NARCAN) 4 mg/0.1 mL nasal spray, Administer 1 spray into a nostril. If no response after 2-3 minutes, give another dose in the other nostril using a new spray.  (Patient not taking: Reported on 8/23/2023), Disp: 1 each, Rfl: 1  •  sodium chloride, PF, 0.9 %, 10 mL by Intracatheter route see administration instructions 10mL into port before and after ampicillin doses (Patient not taking: Reported on 5/4/2023), Disp: , Rfl: 0  •  zolpidem (AMBIEN) 5 mg tablet, Take 1 tablet (5 mg total) by mouth daily at bedtime as needed for sleep (Patient not taking: Reported on 9/28/2023), Disp: 30 tablet, Rfl: 0         Objective:         Vitals:    09/28/23 0805   BP: 140/74   BP Location: Left arm   Patient Position: Sitting   Cuff Size: Large   Pulse: 73   Temp: 98.7 °F (37.1 °C)   SpO2: 97%   Weight: 107 kg (235 lb 3.2 oz)   Height: 6' 0.99" (1.854 m)     Physical Exam  Vitals reviewed. Constitutional:       Appearance: He is well-developed. HENT:      Head: Normocephalic and atraumatic. Nose: Nose normal.      Mouth/Throat:      Pharynx: No oropharyngeal exudate. Eyes:      General: No scleral icterus. Right eye: No discharge. Left eye: No discharge. Pupils: Pupils are equal, round, and reactive to light. Neck:      Trachea: No tracheal deviation. Cardiovascular:      Rate and Rhythm: Normal rate and regular rhythm. Pulses:           Dorsalis pedis pulses are 2+ on the right side and 2+ on the left side. Posterior tibial pulses are 2+ on the right side and 2+ on the left side. Heart sounds: Normal heart sounds. No murmur heard. No friction rub. No gallop. Pulmonary:      Effort: Pulmonary effort is normal. No respiratory distress. Breath sounds: Normal breath sounds. No wheezing or rales. Abdominal:      General: Bowel sounds are normal. There is no distension. Palpations: Abdomen is soft. Tenderness: There is no abdominal tenderness. There is no guarding or rebound. Musculoskeletal:         General: Normal range of motion. Cervical back: Normal range of motion and neck supple. Lymphadenopathy:      Head:      Right side of head: No submental or submandibular adenopathy. Left side of head: No submental or submandibular adenopathy. Cervical: No cervical adenopathy. Right cervical: No superficial, deep or posterior cervical adenopathy.      Left cervical: No superficial, deep or posterior cervical adenopathy. Skin:     General: Skin is warm and dry. Findings: No erythema. Neurological:      Mental Status: He is alert and oriented to person, place, and time. Cranial Nerves: No cranial nerve deficit. Sensory: No sensory deficit. Psychiatric:         Mood and Affect: Mood is not anxious or depressed. Speech: Speech normal.         Behavior: Behavior normal.         Thought Content:  Thought content normal.         Judgment: Judgment normal.

## 2023-09-29 DIAGNOSIS — I10 ESSENTIAL HYPERTENSION: ICD-10-CM

## 2023-09-29 RX ORDER — AMLODIPINE BESYLATE 10 MG/1
10 TABLET ORAL DAILY
Qty: 90 TABLET | Refills: 5 | Status: SHIPPED | OUTPATIENT
Start: 2023-09-29

## 2023-10-02 RX ORDER — ACETAMINOPHEN 325 MG/1
650 TABLET ORAL ONCE
Status: CANCELLED | OUTPATIENT
Start: 2023-10-03

## 2023-10-02 RX ORDER — SODIUM CHLORIDE 9 MG/ML
20 INJECTION, SOLUTION INTRAVENOUS ONCE
Status: CANCELLED | OUTPATIENT
Start: 2023-10-03

## 2023-10-03 ENCOUNTER — HOSPITAL ENCOUNTER (OUTPATIENT)
Dept: INFUSION CENTER | Facility: CLINIC | Age: 68
Discharge: HOME | End: 2023-10-03
Payer: MEDICARE

## 2023-10-03 VITALS
WEIGHT: 235.01 LBS | BODY MASS INDEX: 31.01 KG/M2 | SYSTOLIC BLOOD PRESSURE: 173 MMHG | HEART RATE: 68 BPM | RESPIRATION RATE: 18 BRPM | DIASTOLIC BLOOD PRESSURE: 79 MMHG | TEMPERATURE: 97.2 F

## 2023-10-03 DIAGNOSIS — C91.10 CLL (CHRONIC LYMPHOCYTIC LEUKEMIA) (HCC): Primary | ICD-10-CM

## 2023-10-03 LAB
ALBUMIN SERPL BCP-MCNC: 4 G/DL (ref 3.5–5)
ALP SERPL-CCNC: 39 U/L (ref 34–104)
ALT SERPL W P-5'-P-CCNC: 41 U/L (ref 7–52)
ANION GAP SERPL CALCULATED.3IONS-SCNC: 4 MMOL/L
AST SERPL W P-5'-P-CCNC: 27 U/L (ref 13–39)
BASOPHILS # BLD AUTO: 0.06 THOUSANDS/ÂΜL (ref 0–0.1)
BASOPHILS NFR BLD AUTO: 1 % (ref 0–1)
BILIRUB SERPL-MCNC: 0.52 MG/DL (ref 0.2–1)
BUN SERPL-MCNC: 19 MG/DL (ref 5–25)
CALCIUM SERPL-MCNC: 8.8 MG/DL (ref 8.4–10.2)
CHLORIDE SERPL-SCNC: 107 MMOL/L (ref 96–108)
CO2 SERPL-SCNC: 26 MMOL/L (ref 21–32)
CREAT SERPL-MCNC: 0.98 MG/DL (ref 0.6–1.3)
EOSINOPHIL # BLD AUTO: 0.24 THOUSAND/ÂΜL (ref 0–0.61)
EOSINOPHIL NFR BLD AUTO: 5 % (ref 0–6)
ERYTHROCYTE [DISTWIDTH] IN BLOOD BY AUTOMATED COUNT: 13.4 % (ref 11.6–15.1)
GFR SERPL CREATININE-BSD FRML MDRD: 78 ML/MIN/1.73SQ M
GLUCOSE SERPL-MCNC: 163 MG/DL (ref 65–140)
HCT VFR BLD AUTO: 43.3 % (ref 36.5–49.3)
HGB BLD-MCNC: 13.9 G/DL (ref 12–17)
IGG SERPL-MCNC: 499 MG/DL (ref 635–1741)
IMM GRANULOCYTES # BLD AUTO: 0.32 THOUSAND/UL (ref 0–0.2)
IMM GRANULOCYTES NFR BLD AUTO: 7 % (ref 0–2)
LYMPHOCYTES # BLD AUTO: 0.68 THOUSANDS/ÂΜL (ref 0.6–4.47)
LYMPHOCYTES NFR BLD AUTO: 15 % (ref 14–44)
MCH RBC QN AUTO: 30.6 PG (ref 26.8–34.3)
MCHC RBC AUTO-ENTMCNC: 32.1 G/DL (ref 31.4–37.4)
MCV RBC AUTO: 95 FL (ref 82–98)
MONOCYTES # BLD AUTO: 0.61 THOUSAND/ÂΜL (ref 0.17–1.22)
MONOCYTES NFR BLD AUTO: 14 % (ref 4–12)
NEUTROPHILS # BLD AUTO: 2.56 THOUSANDS/ÂΜL (ref 1.85–7.62)
NEUTS SEG NFR BLD AUTO: 58 % (ref 43–75)
NRBC BLD AUTO-RTO: 0 /100 WBCS
PLATELET # BLD AUTO: 144 THOUSANDS/UL (ref 149–390)
PMV BLD AUTO: 11.6 FL (ref 8.9–12.7)
POTASSIUM SERPL-SCNC: 3.9 MMOL/L (ref 3.5–5.3)
PROT SERPL-MCNC: 6 G/DL (ref 6.4–8.4)
RBC # BLD AUTO: 4.54 MILLION/UL (ref 3.88–5.62)
RETICS # AUTO: NORMAL 10*3/UL (ref 14356–105094)
RETICS # CALC: 1.69 % (ref 0.37–1.87)
SODIUM SERPL-SCNC: 137 MMOL/L (ref 135–147)
WBC # BLD AUTO: 4.47 THOUSAND/UL (ref 4.31–10.16)

## 2023-10-03 PROCEDURE — 82784 ASSAY IGA/IGD/IGG/IGM EACH: CPT

## 2023-10-03 PROCEDURE — 80053 COMPREHEN METABOLIC PANEL: CPT | Performed by: INTERNAL MEDICINE

## 2023-10-03 PROCEDURE — 96415 CHEMO IV INFUSION ADDL HR: CPT

## 2023-10-03 PROCEDURE — 85025 COMPLETE CBC W/AUTO DIFF WBC: CPT | Performed by: INTERNAL MEDICINE

## 2023-10-03 PROCEDURE — 96413 CHEMO IV INFUSION 1 HR: CPT

## 2023-10-03 PROCEDURE — 96367 TX/PROPH/DG ADDL SEQ IV INF: CPT

## 2023-10-03 PROCEDURE — 85045 AUTOMATED RETICULOCYTE COUNT: CPT | Performed by: INTERNAL MEDICINE

## 2023-10-03 RX ORDER — SODIUM CHLORIDE 9 MG/ML
20 INJECTION, SOLUTION INTRAVENOUS ONCE
Status: COMPLETED | OUTPATIENT
Start: 2023-10-03 | End: 2023-10-03

## 2023-10-03 RX ORDER — ACETAMINOPHEN 325 MG/1
650 TABLET ORAL ONCE
Status: COMPLETED | OUTPATIENT
Start: 2023-10-03 | End: 2023-10-03

## 2023-10-03 RX ADMIN — DIPHENHYDRAMINE HYDROCHLORIDE 25 MG: 50 INJECTION, SOLUTION INTRAMUSCULAR; INTRAVENOUS at 08:55

## 2023-10-03 RX ADMIN — SODIUM CHLORIDE 20 ML/HR: 0.9 INJECTION, SOLUTION INTRAVENOUS at 08:30

## 2023-10-03 RX ADMIN — DEXAMETHASONE SODIUM PHOSPHATE 20 MG: 10 INJECTION, SOLUTION INTRAMUSCULAR; INTRAVENOUS at 08:35

## 2023-10-03 RX ADMIN — ACETAMINOPHEN 650 MG: 325 TABLET ORAL at 08:32

## 2023-10-03 RX ADMIN — OBINUTUZUMAB 1000 MG: 1000 INJECTION, SOLUTION, CONCENTRATE INTRAVENOUS at 09:58

## 2023-10-07 DIAGNOSIS — R13.19 ESOPHAGEAL DYSPHAGIA: ICD-10-CM

## 2023-10-07 DIAGNOSIS — K21.9 GASTROESOPHAGEAL REFLUX DISEASE WITHOUT ESOPHAGITIS: ICD-10-CM

## 2023-10-09 RX ORDER — PANTOPRAZOLE SODIUM 40 MG/1
40 TABLET, DELAYED RELEASE ORAL 2 TIMES DAILY
Qty: 60 TABLET | Refills: 0 | Status: SHIPPED | OUTPATIENT
Start: 2023-10-09 | End: 2023-10-18 | Stop reason: SDUPTHER

## 2023-10-10 NOTE — TELEPHONE ENCOUNTER
Patient agreeable to 10/18/23 at [facility] for [facility].

## 2023-10-16 DIAGNOSIS — I10 PRIMARY HYPERTENSION: ICD-10-CM

## 2023-10-16 DIAGNOSIS — E11.42 TYPE 2 DIABETES MELLITUS WITH DIABETIC POLYNEUROPATHY, WITH LONG-TERM CURRENT USE OF INSULIN (HCC): Primary | ICD-10-CM

## 2023-10-16 DIAGNOSIS — Z79.4 TYPE 2 DIABETES MELLITUS WITH DIABETIC POLYNEUROPATHY, WITH LONG-TERM CURRENT USE OF INSULIN (HCC): Primary | ICD-10-CM

## 2023-10-16 RX ORDER — LOSARTAN POTASSIUM 100 MG/1
100 TABLET ORAL DAILY
Qty: 10 TABLET | Refills: 0 | Status: SHIPPED | OUTPATIENT
Start: 2023-10-16

## 2023-10-17 ENCOUNTER — HOSPITAL ENCOUNTER (OUTPATIENT)
Dept: INFUSION CENTER | Facility: CLINIC | Age: 68
Discharge: HOME | End: 2023-10-17
Payer: MEDICARE

## 2023-10-17 VITALS
HEIGHT: 73 IN | BODY MASS INDEX: 30.97 KG/M2 | HEART RATE: 71 BPM | SYSTOLIC BLOOD PRESSURE: 172 MMHG | TEMPERATURE: 97.8 F | RESPIRATION RATE: 16 BRPM | DIASTOLIC BLOOD PRESSURE: 82 MMHG | WEIGHT: 233.69 LBS

## 2023-10-17 DIAGNOSIS — C91.10 CLL (CHRONIC LYMPHOCYTIC LEUKEMIA) (HCC): ICD-10-CM

## 2023-10-17 DIAGNOSIS — D80.1 HYPOGAMMAGLOBULINEMIA, ACQUIRED (HCC): Primary | ICD-10-CM

## 2023-10-17 RX ORDER — ACETAMINOPHEN 325 MG/1
650 TABLET ORAL ONCE
OUTPATIENT
Start: 2023-11-14

## 2023-10-17 RX ORDER — SODIUM CHLORIDE 9 MG/ML
20 INJECTION, SOLUTION INTRAVENOUS ONCE
Status: COMPLETED | OUTPATIENT
Start: 2023-10-17 | End: 2023-10-17

## 2023-10-17 RX ORDER — ACETAMINOPHEN 325 MG/1
650 TABLET ORAL ONCE
Status: COMPLETED | OUTPATIENT
Start: 2023-10-17 | End: 2023-10-17

## 2023-10-17 RX ORDER — SODIUM CHLORIDE 9 MG/ML
20 INJECTION, SOLUTION INTRAVENOUS ONCE
OUTPATIENT
Start: 2023-11-14

## 2023-10-17 RX ADMIN — ACETAMINOPHEN 650 MG: 325 TABLET, FILM COATED ORAL at 08:21

## 2023-10-17 RX ADMIN — DEXAMETHASONE SODIUM PHOSPHATE 4 MG: 10 INJECTION, SOLUTION INTRAMUSCULAR; INTRAVENOUS at 08:37

## 2023-10-17 RX ADMIN — DIPHENHYDRAMINE HYDROCHLORIDE 12.5 MG: 50 INJECTION, SOLUTION INTRAMUSCULAR; INTRAVENOUS at 08:20

## 2023-10-17 RX ADMIN — Medication 20 G: at 09:18

## 2023-10-17 RX ADMIN — SODIUM CHLORIDE 20 ML/HR: 9 INJECTION, SOLUTION INTRAVENOUS at 08:20

## 2023-10-17 NOTE — PROGRESS NOTES
Pt presents for IVIG. No new meds or concerns. Pt tolerated treatment without adverse reaction. Future appointments discussed. AVS declined.

## 2023-10-18 ENCOUNTER — TELEPHONE (OUTPATIENT)
Dept: GASTROENTEROLOGY | Facility: CLINIC | Age: 68
End: 2023-10-18

## 2023-10-18 ENCOUNTER — OFFICE VISIT (OUTPATIENT)
Dept: GASTROENTEROLOGY | Facility: MEDICAL CENTER | Age: 68
End: 2023-10-18

## 2023-10-18 VITALS
BODY MASS INDEX: 30.85 KG/M2 | HEART RATE: 73 BPM | DIASTOLIC BLOOD PRESSURE: 78 MMHG | TEMPERATURE: 98.6 F | WEIGHT: 233.8 LBS | SYSTOLIC BLOOD PRESSURE: 153 MMHG

## 2023-10-18 DIAGNOSIS — K21.9 GASTROESOPHAGEAL REFLUX DISEASE WITHOUT ESOPHAGITIS: ICD-10-CM

## 2023-10-18 DIAGNOSIS — R13.19 ESOPHAGEAL DYSPHAGIA: ICD-10-CM

## 2023-10-18 DIAGNOSIS — Z86.010 PERSONAL HISTORY OF COLONIC POLYPS: Primary | ICD-10-CM

## 2023-10-18 RX ORDER — PANTOPRAZOLE SODIUM 40 MG/1
40 TABLET, DELAYED RELEASE ORAL DAILY
Qty: 90 TABLET | Refills: 3 | Status: SHIPPED | OUTPATIENT
Start: 2023-10-18

## 2023-10-18 RX ORDER — BISACODYL 5 MG/1
TABLET, DELAYED RELEASE ORAL
Qty: 2 TABLET | Refills: 0 | Status: SHIPPED | OUTPATIENT
Start: 2023-10-18

## 2023-10-18 NOTE — TELEPHONE ENCOUNTER
Scheduled date of colonoscopy/EGD (as of today): 01/25/2024  Physician performing colonoscopy: Dr. Tomasz Vega   Location of colonoscopy: [facility]  Bowel prep reviewed with patient: Golytely   Instructions reviewed with patient by: Nina Dorado   Clearances: N/A     Pt is Diabetic     Pt was seen today by Cain Beaver PA-C. Pt states he received a folder prep with instructions. Mailed new procedure instrucitions with updated procedure date along w/ Diabetic medication sheet.

## 2023-10-18 NOTE — PROGRESS NOTES
[facility] - Outpatient Follow-up Note  Jonn Morris 70 y.o. male MRN: [id number]  Encounter: [id number]      Assessment and Plan     1. GERD with erosive esophagitis and esophogeal narrowing   2. History of HSV esophagitis/ulcer 2016  The patient has a history of acid reflux with erosive esophagitis and esophageal narrowing requiring dilation as well as a history of HSV esophagitis/ulcer in 2016. He is currently taking pantoprazole 40 mg every morning and 2 Pepcid Complete at night with no complaints aside from very rare episodes of dysphagia. Last EGD 6/2022 with grade C esophagitis, esophogeal dilation with a through the scope dilator up to 19 mm, and a large hiatal hernia. Esophageal pathology revealed reactive squamous mucosa with portions of ulcer and granulation tissue showing acute and chronic inflammation, negative for dysplasia or carcinoma, negative HSV. -The patient was previously on twice daily pantoprazole but this is cost prohibitive to him so he has been taking pantoprazole 40 mg every morning and 2 Pepcid Complete at night, we will continue this current regimen and repeat upper endoscopy for follow-up evaluation of his dysphagia and grade C esophagitis    3. Colon cancer screening   Last colonoscopy in 2016 with two polyps removed, recall recommended 5 years later  -Was due for colonoscopy in 2021, discussed proceeding with colonoscopy now for which the patient is agreeable to    Follow-up after EGD and colonoscopy as needed    ______________________________________________________________________    History of Present Illness  Jonn Morris is a 70 y.o. male with HTN, HLD, CKD, COPD, steroid induced diabetes, CAD s/p stent, and stage IV CLL on ibrutinib, IVIG, and chronic prednisone here for follow up evaluation of GERD and dysphagia. The patient is currently taking pantoprazole 40 mg every morning and 2 Pepcid Complete at night.   He states that he is doing well with very rare episodes of dysphagia. The patient has had multiple EGDs in the past. In 2016 he was diagnosed with an HSV esophogeal ulcer and more recently with erosive esophagitis and narrowing of the esophagus s/p dilation. Last EGD 6/2022 with grade C esophagitis, esophogeal dilation with a through the scope dilator up to 19 mm, and a large hiatal hernia. Esophageal pathology revealed reactive squamous mucosa with portions of ulcer and granulation tissue showing acute and chronic inflammation, negative for dysplasia or carcinoma, negative HSV. Review of Systems   Constitutional:  Negative for activity change, appetite change, chills, fatigue, fever and unexpected weight change. Gastrointestinal:  Negative for abdominal distention, abdominal pain, anal bleeding, blood in stool, constipation, diarrhea, nausea, rectal pain and vomiting. Musculoskeletal:  Negative for back pain and gait problem. Psychiatric/Behavioral:  Negative for confusion.         Past Medical History  Past Medical History:   Diagnosis Date    Allergic     Anemia     Arthritis     CAD (coronary artery disease) 2004    Cancer ([address])     Leukemia    Cellulitis of scalp     CKD (chronic kidney disease) stage 3, GFR 30-59 ml/min (HCC)     CLL (chronic lymphocytic leukemia) (HCC)     COPD (chronic obstructive pulmonary disease) ([address])     Diabetes mellitus ([address])     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    Dyslipidemia     Edema extremities     Esophageal ulcer     H/O blood clots     Hemolytic anemia (HCC)     Hiatal hernia     History of TIA (transient ischemic attack)     Hyperlipidemia     Hypertension     Listeria infection 2018    Multiple gastric ulcers     Myocardial infarction ([address]) 2004    acute    Neutropenia (HCC)     Obese     Recurrent sinusitis     Thrombocytopenia (HCC)     Vertigo        Past Social history  Past Surgical History:   Procedure Laterality Date    ARTHROSCOPIC REPAIR ACL      lt knee    CARDIAC SURGERY      cardiac cath with stent    FOOT SURGERY      IR PORT CHECK  2019    KNEE ARTHROSCOPY      OTHER SURGICAL HISTORY      cardiac cath lesion 1, 1st adjunct treat device: stent    PORTACATH PLACEMENT      TN ESOPHAGOGASTRODUODENOSCOPY TRANSORAL DIAGNOSTIC N/A 10/5/2017    Procedure: ESOPHAGOGASTRODUODENOSCOPY (EGD) with bx;  Surgeon: Lokesh Merritt DO;  Location: [facility]; Service: Gastroenterology    TN ESOPHAGOGASTRODUODENOSCOPY TRANSORAL DIAGNOSTIC N/A 3/8/2018    Procedure: ESOPHAGOGASTRODUODENOSCOPY (EGD) with biopsy;  Surgeon: Lokesh Merritt DO;  Location: [facility]; Service: Gastroenterology    TN ESOPHAGOGASTRODUODENOSCOPY TRANSORAL DIAGNOSTIC N/A 2018    Procedure: ESOPHAGOGASTRODUODENOSCOPY (EGD) with Dilation;  Surgeon: Lokesh Merritt DO;  Location: [facility]; Service: Gastroenterology    TN ESOPHAGOGASTRODUODENOSCOPY TRANSORAL DIAGNOSTIC N/A 10/18/2018    Procedure: ESOPHAGOGASTRODUODENOSCOPY (EGD) with dilation;  Surgeon: Tanja Nieto MD;  Location: [facility]; Service: Gastroenterology    TN ESOPHAGOSCOPY FLEXIBLE TRANSORAL WITH BIOPSY N/A 2016    Procedure: ESOPHAGOGASTRODUODENOSCOPY (EGD); ESOPHAGEAL DILATION; ESOPHAGEAL BIOPSY;  Surgeon: Davide Campo MD;  Location:  MAIN OR;  Service: Thoracic    TONSILLECTOMY      UPPER GASTROINTESTINAL ENDOSCOPY      x 6     Social History     Socioeconomic History    Marital status: /Civil Union     Spouse name: Not on file    Number of children: Not on file    Years of education: Not on file    Highest education level: Not on file   Occupational History    Occupation: [employer]    Occupation: retired    Tobacco Use    Smoking status: Former     Packs/day: 2.00     Years: 33.00     Total pack years: 66.00     Types: Cigarettes     Start date:      Quit date: 2011     Years since quittin.8     Passive exposure: Past    Smokeless tobacco: Never   Vaping Use    Vaping Use: Never used   Substance and Sexual Activity    Alcohol use:  Yes Alcohol/week: 2.0 standard drinks of alcohol     Types: 2 Cans of beer per week     Comment: social use as per [employer]    Drug use: Never    Sexual activity: Not on file   Other Topics Concern    Not on file   Social History Narrative    Not on file     Social Determinants of Health     Financial Resource Strain: High Risk (3/27/2023)    Overall Financial Resource Strain (CARDIA)     Difficulty of Paying Living Expenses: Hard   Food Insecurity: No Food Insecurity (4/15/2022)    Hunger Vital Sign     Worried About Running Out of Food in the Last Year: Never true     Ran Out of Food in the Last Year: Never true   Transportation Needs: No Transportation Needs (3/27/2023)    PRAPARE - Transportation     Lack of Transportation (Medical): No     Lack of Transportation (Non-Medical):  No   Physical Activity: Not on file   Stress: Not on file   Social Connections: Not on file   Intimate Partner Violence: Not on file   Housing Stability: Low Risk  (4/15/2022)    Housing Stability Vital Sign     Unable to Pay for Housing in the Last Year: No     Number of Places Lived in the Last Year: 1     Unstable Housing in the Last Year: No     Social History     Substance and Sexual Activity   Alcohol Use Yes    Alcohol/week: 2.0 standard drinks of alcohol    Types: 2 Cans of beer per week    Comment: social use as per [employer]     Social History     Substance and Sexual Activity   Drug Use Never     Social History     Tobacco Use   Smoking Status Former    Packs/day: 2.00    Years: 33.00    Total pack years: 66.00    Types: Cigarettes    Start date: 1    Quit date:     Years since quittin.8    Passive exposure: Past   Smokeless Tobacco Never       Past Family History  Family History   Problem Relation Age of Onset    Leukemia Mother         chronic    Colon polyps Mother         sidmoid    Parkinsonism Father        Current Medications  Current Outpatient Medications   Medication Sig Dispense Refill    acyclovir (ZOVIRAX) 400 MG tablet TAKE 1 TABLET TWICE A DAY 60 tablet 11    albuterol (2.5 mg/3 mL) 0.083 % nebulizer solution Take 3 mL (2.5 mg total) by nebulization every 6 (six) hours as needed for wheezing or shortness of breath 180 mL 5    amLODIPine (NORVASC) 10 mg tablet TAKE 1 TABLET DAILY 90 tablet 5    aspirin 81 MG tablet Take 81 mg by mouth daily Every other day      benzonatate (TESSALON) 200 MG capsule TAKE 1 CAPSULE THREE TIMES A DAY AS NEEDED FOR COUGH (Patient not taking: Reported on 5/4/2023) 20 capsule 51    Blood Glucose Monitoring Suppl (FreeStyle Freedom Lite) w/Device KIT Use 3 (three) times a day 1 kit 0    citalopram (CeleXA) 40 mg tablet TAKE 1 TABLET DAILY 90 tablet 1    Continuous Blood Gluc  (Manyetayle Ashley 2 Lancing) JOE Use to scan sensor premeals and at bedtime 1 each 1    Continuous Blood Gluc Sensor (FreeStyle Ashley 2 Sensor) MISC Apply 1 sensor every 14 days. Use as directed with Freestyle Ashley 2 reader.  2 each 2    Diclofenac Sodium (VOLTAREN) 1 % Apply 4 g topically 4 (four) times a day 100 g 0    fenofibrate (TRICOR) 145 mg tablet TAKE 1 TABLET DAILY 90 tablet 5    fluticasone (FLONASE) 50 mcg/act nasal spray 2 sprays into each nostril daily 16 g 0    folic acid (FOLVITE) 1 mg tablet Take 800 mcg by mouth daily       gabapentin (NEURONTIN) 600 MG tablet TAKE 1 TABLET DAILY 90 tablet 1    glucose blood (FREESTYLE LITE) test strip Check blood sugar 3 times a day 300 strip 1    Ibrutinib 140 MG TABS Take 140 mg by mouth daily 30 tablet 6    insulin degludec Quintella Aas FlexTouch) 100 units/mL injection pen Inject 18 Units under the skin daily at bedtime 15 mL 3    insulin lispro (Admelog SoloStar) 100 units/mL injection pen 12-14 units before each meal 15 mL 0    Insulin Pen Needle (BD Pen Needle Inez U/F) 32G X 4 MM MISC Use 3 (three) times a day 300 each 0    Lancets (FREESTYLE) lancets Use as instructed--test 2 times a day    E 11.9 (Patient not taking: Reported on 8/23/2023) 100 each 0    Lancets (freestyle) lancets TEST BLOOD SUGAR 3 TIMES A  each 1    LORazepam (ATIVAN) 0.5 mg tablet Take 1 tablet (0.5 mg total) by mouth daily as needed for anxiety 30 tablet 0    losartan (COZAAR) 100 MG tablet TAKE 1 TABLET DAILY 90 tablet 1    losartan (COZAAR) 100 MG tablet Take 1 tablet (100 mg total) by mouth daily 10 tablet 0    mometasone-formoterol (Dulera) 200-5 MCG/ACT inhaler Inhale 2 puffs 2 (two) times a day Rinse mouth after use. 13 g 1    multivitamin (THERAGRAN) TABS Take 1 tablet by mouth daily      naloxone (NARCAN) 4 mg/0.1 mL nasal spray Administer 1 spray into a nostril. If no response after 2-3 minutes, give another dose in the other nostril using a new spray. (Patient not taking: Reported on 9/28/2023) 1 each 1    naloxone (NARCAN) 4 mg/0.1 mL nasal spray Administer 1 spray into a nostril. If no response after 2-3 minutes, give another dose in the other nostril using a new spray. (Patient not taking: Reported on 8/23/2023) 1 each 1    obinutuzumab (GAZYVA) 1000 mg/40 mL SOLN Infuse into a venous catheter      oxyCODONE (ROXICODONE) 10 MG TABS Take 1 tablet (10 mg total) by mouth every 6 (six) hours as needed for moderate pain For ongoing pain control Max Daily Amount: 40 mg 28 tablet 0    pantoprazole (PROTONIX) 40 mg tablet Take 1 tablet (40 mg total) by mouth 2 (two) times a day 60 tablet 0    predniSONE 5 mg tablet TAKE 1 TABLET DAILY 90 tablet 5    sodium chloride, PF, 0.9 % 10 mL by Intracatheter route see administration instructions 10mL into port before and after ampicillin doses (Patient not taking: Reported on 5/4/2023)  0    Ventolin  (90 Base) MCG/ACT inhaler USE 2 INHALATIONS EVERY 6 HOURS AS NEEDED FOR WHEEZING 54 g 5    zolpidem (AMBIEN) 5 mg tablet Take 1 tablet (5 mg total) by mouth daily at bedtime as needed for sleep (Patient not taking: Reported on 9/28/2023) 30 tablet 0     No current facility-administered medications for this visit. Allergies  Allergies   Allergen Reactions    Heparin Other (See Comments)     Other reaction(s): Blood Disorders  clot    Macrolides And Ketolides Other (See Comments)     Interacts with other meds he is taking    Simvastatin Myalgia         The following portions of the patient's history were reviewed and updated as appropriate: allergies, current medications, past medical history, past social history, past surgical history and problem list.      Vitals  There were no vitals filed for this visit. Physical Exam  Constitutional   General appearance: Patient is seated and in no acute distress, well appearing and well nourished. Head and Face   Head and face: Normal.    Eyes   Conjunctiva and lids: No erythema, swelling or discharge. Anicteric. Ears, Nose, Mouth, and Throat   Hearing: Normal.    Neck: Supple, trachea midline. Pulmonary   Respiratory effort: No increased work of breathing or signs of respiratory distress. Lungs: Clear to ascultation, no wheezes, rhonchi, or rales  Cardiovascular   Heart: Regular rate and rhythm, no murmurs gallops or rubs   Examination of extremities for edema and/or varicosities: Normal.    Musculoskeletal   Gait and station: Normal   Skin   Skin and subcutaneous tissue: Warm, dry, and intact. No visible jaundice, lesions or rashes. Psychiatric   Judgment and insight: Normal  Recent and remote memory:  Normal  Mood and affect: Normal      Results  No visits with results within 1 Day(s) from this visit.    Latest known visit with results is:   Hospital Outpatient Visit on 10/03/2023   Component Date Value    IGG 10/03/2023 499 (L)     WBC 10/03/2023 4.47     RBC 10/03/2023 4.54     Hemoglobin 10/03/2023 13.9     Hematocrit 10/03/2023 43.3     MCV 10/03/2023 95     MCH 10/03/2023 30.6     MCHC 10/03/2023 32.1     RDW 10/03/2023 13.4     MPV 10/03/2023 11.6     Platelets 41/68/8083 144 (L)     nRBC 10/03/2023 0     Neutrophils Relative 10/03/2023 58     Immat GRANS % 10/03/2023 7 (H)     Lymphocytes Relative 10/03/2023 15     Monocytes Relative 10/03/2023 14 (H)     Eosinophils Relative 10/03/2023 5     Basophils Relative 10/03/2023 1     Neutrophils Absolute 10/03/2023 2.56     Immature Grans Absolute 10/03/2023 0.32 (H)     Lymphocytes Absolute 10/03/2023 0.68     Monocytes Absolute 10/03/2023 0.61     Eosinophils Absolute 10/03/2023 0.24     Basophils Absolute 10/03/2023 0.06     Sodium 10/03/2023 137     Potassium 10/03/2023 3.9     Chloride 10/03/2023 107     CO2 10/03/2023 26     ANION GAP 10/03/2023 4     BUN 10/03/2023 19     Creatinine 10/03/2023 0.98     Glucose 10/03/2023 163 (H)     Calcium 10/03/2023 8.8     AST 10/03/2023 27     ALT 10/03/2023 41     Alkaline Phosphatase 10/03/2023 39     Total Protein 10/03/2023 6.0 (L)     Albumin 10/03/2023 4.0     Total Bilirubin 10/03/2023 0.52     eGFR 10/03/2023 78     Retic Ct Abs 10/03/2023 76,700     Retic Ct Pct 10/03/2023 1.69        Radiology Results  No results found. Orders  No orders of the defined types were placed in this encounter.

## 2023-10-18 NOTE — Clinical Note
Hi!  Just an Kodak Nay - this patient is actually doing well from a dysphagia standpoint but he is due for colonoscopy so I also added on EGD since his last was 6/2022 and didn't look great  Hope you are well!! Birdie Alicia

## 2023-10-18 NOTE — TELEPHONE ENCOUNTER
Patients GI provider:  Dr. Greg Oliver    Number to return call: [phone]    Reason for call: Pt calling to schedule EGD and colonoscopy that he was consulted for today. Pt only wants procedures done by Dr. Greg Oliver. Offered pt [facility] in which he declined because it is too far. I looked through schedules at [facility] [place] and did not see Dr. Kojo Wong name on the grid. Pt would like to be scheduled at [facility] with Dr. Greg Oliver. Please call pt and schedule egd and colonoscopy.      Scheduled procedure/appointment date if applicable: none

## 2023-10-20 ENCOUNTER — TELEPHONE (OUTPATIENT)
Dept: HEMATOLOGY ONCOLOGY | Facility: CLINIC | Age: 68
End: 2023-10-20

## 2023-10-20 NOTE — TELEPHONE ENCOUNTER
I called [facility] regarding an appointment that they have scheduled with Dr. Leyda Ravi scheduled on  12/06/23      I left a voicemail explaining to patient that this appointment will need to be rescheduled due to a change in the providers schedule. Patient was advised to call [facility] to reschedule.   Another attempt will be made to reschedule

## 2023-10-23 ENCOUNTER — TELEPHONE (OUTPATIENT)
Dept: HEMATOLOGY ONCOLOGY | Facility: CLINIC | Age: 68
End: 2023-10-23

## 2023-10-23 NOTE — TELEPHONE ENCOUNTER
Appointment Change  Cancel, Reschedule, Change to Virtual      Who are you speaking with? Patient   If it is not the patient, is the caller listed on the communication consent form? N/A   Which provider is the appointment scheduled with? Dr. Elma Horvath   When was the original appointment scheduled? Please list date and time 12/6/23 820   At which location is the appointment scheduled to take place? [place]   Was the appointment rescheduled? Was the appointment changed from an in person visit to a virtual visit? If so, please list the details of the change. 1/9/23 1140   What is the reason for the appointment change? provider       Was [facility] transport scheduled? N/A   Does [facility] transport need to be scheduled for the new visit (if applicable) N/A   Does the patient need an infusion appointment rescheduled? N/A   Does the patient have an upcoming infusion appointment scheduled? If so, when? No   Is the patient undergoing chemotherapy? N/A   For appointments cancelled with less than 24 hours:  Was the no-show policy reviewed?  N/A

## 2023-10-23 NOTE — TELEPHONE ENCOUNTER
I called [facility] regarding an appointment that they have scheduled with Dr. Jojo Duarte scheduled on  12/06/23      I left a voicemail explaining to patient that this appointment will need to be rescheduled due to a change in the providers schedule. Patient was advised to call [facility] to reschedule.   Another attempt will be made to reschedule

## 2023-10-24 RX ORDER — ACETAMINOPHEN 325 MG/1
650 TABLET ORAL ONCE
Status: CANCELLED | OUTPATIENT
Start: 2023-10-31

## 2023-10-24 RX ORDER — SODIUM CHLORIDE 9 MG/ML
20 INJECTION, SOLUTION INTRAVENOUS ONCE
Status: CANCELLED | OUTPATIENT
Start: 2023-10-31

## 2023-10-25 DIAGNOSIS — C91.10 CHRONIC LYMPHATIC LEUKEMIA (HCC): ICD-10-CM

## 2023-10-26 DIAGNOSIS — E09.9 STEROID-INDUCED DIABETES MELLITUS, SEQUELA: ICD-10-CM

## 2023-10-26 DIAGNOSIS — T38.0X5S STEROID-INDUCED DIABETES MELLITUS, SEQUELA: ICD-10-CM

## 2023-10-26 RX ORDER — INSULIN LISPRO 100 [IU]/ML
INJECTION, SOLUTION INTRAVENOUS; SUBCUTANEOUS
Qty: 15 ML | Refills: 0 | Status: SHIPPED | OUTPATIENT
Start: 2023-10-26

## 2023-10-31 ENCOUNTER — HOSPITAL ENCOUNTER (OUTPATIENT)
Dept: INFUSION CENTER | Facility: CLINIC | Age: 68
Discharge: HOME | End: 2023-10-31
Payer: MEDICARE

## 2023-10-31 VITALS
WEIGHT: 235 LBS | SYSTOLIC BLOOD PRESSURE: 166 MMHG | DIASTOLIC BLOOD PRESSURE: 91 MMHG | RESPIRATION RATE: 18 BRPM | HEIGHT: 73 IN | BODY MASS INDEX: 31.14 KG/M2 | HEART RATE: 75 BPM | TEMPERATURE: 97.7 F

## 2023-10-31 DIAGNOSIS — C91.10 CLL (CHRONIC LYMPHOCYTIC LEUKEMIA) (HCC): Primary | ICD-10-CM

## 2023-10-31 LAB
ALBUMIN SERPL BCP-MCNC: 4 G/DL (ref 3.5–5)
ALP SERPL-CCNC: 46 U/L (ref 34–104)
ALT SERPL W P-5'-P-CCNC: 39 U/L (ref 7–52)
ANION GAP SERPL CALCULATED.3IONS-SCNC: 6 MMOL/L
AST SERPL W P-5'-P-CCNC: 30 U/L (ref 13–39)
BASOPHILS # BLD MANUAL: 0.06 THOUSAND/UL (ref 0–0.1)
BASOPHILS NFR MAR MANUAL: 1 % (ref 0–1)
BILIRUB SERPL-MCNC: 0.4 MG/DL (ref 0.2–1)
BUN SERPL-MCNC: 21 MG/DL (ref 5–25)
CALCIUM SERPL-MCNC: 8.8 MG/DL (ref 8.4–10.2)
CHLORIDE SERPL-SCNC: 108 MMOL/L (ref 96–108)
CO2 SERPL-SCNC: 26 MMOL/L (ref 21–32)
CREAT SERPL-MCNC: 1.11 MG/DL (ref 0.6–1.3)
DOHLE BOD BLD QL SMEAR: PRESENT
EOSINOPHIL # BLD MANUAL: 0.57 THOUSAND/UL (ref 0–0.4)
EOSINOPHIL NFR BLD MANUAL: 10 % (ref 0–6)
ERYTHROCYTE [DISTWIDTH] IN BLOOD BY AUTOMATED COUNT: 13.3 % (ref 11.6–15.1)
GFR SERPL CREATININE-BSD FRML MDRD: 67 ML/MIN/1.73SQ M
GLUCOSE SERPL-MCNC: 215 MG/DL (ref 65–140)
HCT VFR BLD AUTO: 41.2 % (ref 36.5–49.3)
HGB BLD-MCNC: 13.5 G/DL (ref 12–17)
IGG SERPL-MCNC: 470 MG/DL (ref 635–1741)
LYMPHOCYTES # BLD AUTO: 0.69 THOUSAND/UL (ref 0.6–4.47)
LYMPHOCYTES # BLD AUTO: 11 % (ref 14–44)
MCH RBC QN AUTO: 30.6 PG (ref 26.8–34.3)
MCHC RBC AUTO-ENTMCNC: 32.8 G/DL (ref 31.4–37.4)
MCV RBC AUTO: 93 FL (ref 82–98)
MONOCYTES # BLD AUTO: 0.8 THOUSAND/UL (ref 0–1.22)
MONOCYTES NFR BLD: 14 % (ref 4–12)
NEUTROPHILS # BLD MANUAL: 3.6 THOUSAND/UL (ref 1.85–7.62)
NEUTS BAND NFR BLD MANUAL: 2 % (ref 0–8)
NEUTS SEG NFR BLD AUTO: 61 % (ref 43–75)
PLATELET # BLD AUTO: 149 THOUSANDS/UL (ref 149–390)
PLATELET BLD QL SMEAR: ABNORMAL
PMV BLD AUTO: 11.7 FL (ref 8.9–12.7)
POTASSIUM SERPL-SCNC: 3.6 MMOL/L (ref 3.5–5.3)
PROT SERPL-MCNC: 6 G/DL (ref 6.4–8.4)
RBC # BLD AUTO: 4.41 MILLION/UL (ref 3.88–5.62)
RBC MORPH BLD: NORMAL
RETICS # AUTO: NORMAL 10*3/UL (ref 14356–105094)
RETICS # CALC: 1.64 % (ref 0.37–1.87)
SODIUM SERPL-SCNC: 140 MMOL/L (ref 135–147)
VARIANT LYMPHS # BLD AUTO: 1 %
WBC # BLD AUTO: 5.72 THOUSAND/UL (ref 4.31–10.16)

## 2023-10-31 PROCEDURE — 85027 COMPLETE CBC AUTOMATED: CPT | Performed by: INTERNAL MEDICINE

## 2023-10-31 PROCEDURE — 85007 BL SMEAR W/DIFF WBC COUNT: CPT | Performed by: INTERNAL MEDICINE

## 2023-10-31 PROCEDURE — 96367 TX/PROPH/DG ADDL SEQ IV INF: CPT

## 2023-10-31 PROCEDURE — 85045 AUTOMATED RETICULOCYTE COUNT: CPT | Performed by: INTERNAL MEDICINE

## 2023-10-31 PROCEDURE — 80053 COMPREHEN METABOLIC PANEL: CPT | Performed by: INTERNAL MEDICINE

## 2023-10-31 PROCEDURE — 96413 CHEMO IV INFUSION 1 HR: CPT

## 2023-10-31 PROCEDURE — 96415 CHEMO IV INFUSION ADDL HR: CPT

## 2023-10-31 PROCEDURE — 82784 ASSAY IGA/IGD/IGG/IGM EACH: CPT | Performed by: INTERNAL MEDICINE

## 2023-10-31 RX ORDER — SODIUM CHLORIDE 9 MG/ML
20 INJECTION, SOLUTION INTRAVENOUS ONCE
Status: COMPLETED | OUTPATIENT
Start: 2023-10-31 | End: 2023-10-31

## 2023-10-31 RX ORDER — ACETAMINOPHEN 325 MG/1
650 TABLET ORAL ONCE
Status: COMPLETED | OUTPATIENT
Start: 2023-10-31 | End: 2023-10-31

## 2023-10-31 RX ADMIN — DIPHENHYDRAMINE HYDROCHLORIDE 25 MG: 50 INJECTION, SOLUTION INTRAMUSCULAR; INTRAVENOUS at 08:52

## 2023-10-31 RX ADMIN — OBINUTUZUMAB 1000 MG: 1000 INJECTION, SOLUTION, CONCENTRATE INTRAVENOUS at 09:43

## 2023-10-31 RX ADMIN — SODIUM CHLORIDE 20 ML/HR: 0.9 INJECTION, SOLUTION INTRAVENOUS at 08:27

## 2023-10-31 RX ADMIN — ACETAMINOPHEN 650 MG: 325 TABLET, FILM COATED ORAL at 08:32

## 2023-10-31 RX ADMIN — DEXAMETHASONE SODIUM PHOSPHATE 20 MG: 10 INJECTION, SOLUTION INTRAMUSCULAR; INTRAVENOUS at 08:28

## 2023-11-12 ENCOUNTER — APPOINTMENT (OUTPATIENT)
Dept: RADIOLOGY | Facility: MEDICAL CENTER | Age: 68
End: 2023-11-12
Payer: MEDICARE

## 2023-11-12 ENCOUNTER — OFFICE VISIT (OUTPATIENT)
Dept: URGENT CARE | Facility: MEDICAL CENTER | Age: 68
End: 2023-11-12
Payer: MEDICARE

## 2023-11-12 VITALS
SYSTOLIC BLOOD PRESSURE: 134 MMHG | WEIGHT: 232 LBS | BODY MASS INDEX: 31.42 KG/M2 | RESPIRATION RATE: 20 BRPM | TEMPERATURE: 99 F | HEART RATE: 70 BPM | HEIGHT: 72 IN | DIASTOLIC BLOOD PRESSURE: 68 MMHG | OXYGEN SATURATION: 97 %

## 2023-11-12 DIAGNOSIS — S69.91XA RIGHT WRIST INJURY, INITIAL ENCOUNTER: ICD-10-CM

## 2023-11-12 DIAGNOSIS — S69.91XA RIGHT WRIST INJURY, INITIAL ENCOUNTER: Primary | ICD-10-CM

## 2023-11-12 DIAGNOSIS — S60.211A CONTUSION OF RIGHT WRIST, INITIAL ENCOUNTER: ICD-10-CM

## 2023-11-12 DIAGNOSIS — S63.501A SPRAIN OF RIGHT WRIST, INITIAL ENCOUNTER: ICD-10-CM

## 2023-11-12 PROCEDURE — G0463 HOSPITAL OUTPT CLINIC VISIT: HCPCS | Performed by: NURSE PRACTITIONER

## 2023-11-12 PROCEDURE — 73110 X-RAY EXAM OF WRIST: CPT

## 2023-11-12 PROCEDURE — 99214 OFFICE O/P EST MOD 30 MIN: CPT | Performed by: NURSE PRACTITIONER

## 2023-11-12 NOTE — PATIENT INSTRUCTIONS
Take medications as directed  Ice to the right wrist  Wear wrist splint as directed,   Final results of xray pending and will be on your [employer] [facility]

## 2023-11-12 NOTE — PROGRESS NOTES
NAME: Herb Rico is a 70 y.o. male  : 1955    MRN: [id number]    /68   Pulse 70   Temp 99 °F (37.2 °C)   Resp 20   Ht 6' (1.829 m)   Wt 105 kg (232 lb)   SpO2 97%   BMI 31.46 kg/m²     3:17 PM    Assessment and Plan   Right wrist injury, initial encounter [S69.91XA]  1. Right wrist injury, initial encounter  XR wrist 3+ vw right      2. Sprain of right wrist, initial encounter        3. Contusion of right wrist, initial encounter            Jorje Hu was seen today for wrist pain. Diagnoses and all orders for this visit:    Right wrist injury, initial encounter  -     XR wrist 3+ vw right; Future    Sprain of right wrist, initial encounter    Contusion of right wrist, initial encounter        Patient Instructions     Patient Instructions   Take medications as directed  Ice to the right wrist  Wear wrist splint as directed,   Final results of xray pending and will be on your [employer] sal      Proceed to the nearest ER if symptoms worsen, Follow up with your PCP  Continue to social distance, wash your hands, and wear your masks. Please continue to follow the CDC. gov guidelines daily for they are subject to change on COVID-19    Chief Complaint     Chief Complaint   Patient presents with    Wrist Pain     Patient states he has been having pain in his R wrist for 2 weeks. Today he was tired of it         History of Present Illness     70 yr old male here today with right wrist pain ongoing for 2 weeks, bothersome and tired of the pain. Denies injury and trauma that he can recall to the right eye, there is swelling to the styloid process of the right wrist, ROM is limited with turning and twisting due to pain, skin intact, no erythema, no open wounds            Review of Systems   Review of Systems   Constitutional:  Negative for fatigue. Musculoskeletal:  Positive for arthralgias (right wrist). Negative for joint swelling. Skin: Negative.           Current Medications       Current Outpatient Medications:     acyclovir (ZOVIRAX) 400 MG tablet, TAKE 1 TABLET TWICE A DAY, Disp: 60 tablet, Rfl: 11    albuterol (2.5 mg/3 mL) 0.083 % nebulizer solution, Take 3 mL (2.5 mg total) by nebulization every 6 (six) hours as needed for wheezing or shortness of breath, Disp: 180 mL, Rfl: 5    amLODIPine (NORVASC) 10 mg tablet, TAKE 1 TABLET DAILY, Disp: 90 tablet, Rfl: 5    aspirin 81 MG tablet, Take 81 mg by mouth daily Every other day, Disp: , Rfl:     benzonatate (TESSALON) 200 MG capsule, TAKE 1 CAPSULE THREE TIMES A DAY AS NEEDED FOR COUGH (Patient not taking: Reported on 5/4/2023), Disp: 20 capsule, Rfl: 51    bisacodyl (DULCOLAX) 5 mg EC tablet, Take as directed as per written office instructions, Disp: 2 tablet, Rfl: 0    Blood Glucose Monitoring Suppl (FreeStyle Freedom Lite) w/Device KIT, Use 3 (three) times a day, Disp: 1 kit, Rfl: 0    citalopram (CeleXA) 40 mg tablet, TAKE 1 TABLET DAILY, Disp: 90 tablet, Rfl: 1    Continuous Blood Gluc  (FreeStyle Ashley 2 Rogers) JOE, Use to scan sensor premeals and at bedtime, Disp: 1 each, Rfl: 1    Continuous Blood Gluc Sensor (FreeStyle Ashley 2 Sensor) MISC, Apply 1 sensor every 14 days.  Use as directed with Freestyle Ashley 2 reader., Disp: 2 each, Rfl: 2    Diclofenac Sodium (VOLTAREN) 1 %, Apply 4 g topically 4 (four) times a day, Disp: 100 g, Rfl: 0    fenofibrate (TRICOR) 145 mg tablet, TAKE 1 TABLET DAILY, Disp: 90 tablet, Rfl: 5    fluticasone (FLONASE) 50 mcg/act nasal spray, 2 sprays into each nostril daily, Disp: 16 g, Rfl: 0    folic acid (FOLVITE) 1 mg tablet, Take 800 mcg by mouth daily , Disp: , Rfl:     gabapentin (NEURONTIN) 600 MG tablet, TAKE 1 TABLET DAILY, Disp: 90 tablet, Rfl: 1    glucose blood (FREESTYLE LITE) test strip, Check blood sugar 3 times a day, Disp: 300 strip, Rfl: 1    Ibrutinib 140 MG TABS, Take 140 mg by mouth daily, Disp: 28 tablet, Rfl: 6    insulin degludec Chandrika Blevins FlexTouch) 100 units/mL injection pen, Inject 18 Units under the skin daily at bedtime, Disp: 15 mL, Rfl: 3    insulin lispro (Admelog SoloStar) 100 units/mL injection pen, 12-14 units before each meal, Disp: 15 mL, Rfl: 0    Insulin Pen Needle (BD Pen Needle Inez U/F) 32G X 4 MM MISC, Use 3 (three) times a day, Disp: 300 each, Rfl: 0    Lancets (FREESTYLE) lancets, Use as instructed--test 2 times a day  E 11.9 (Patient not taking: Reported on 8/23/2023), Disp: 100 each, Rfl: 0    Lancets (freestyle) lancets, TEST BLOOD SUGAR 3 TIMES A DAY, Disp: 300 each, Rfl: 1    LORazepam (ATIVAN) 0.5 mg tablet, Take 1 tablet (0.5 mg total) by mouth daily as needed for anxiety, Disp: 30 tablet, Rfl: 0    losartan (COZAAR) 100 MG tablet, TAKE 1 TABLET DAILY, Disp: 90 tablet, Rfl: 1    losartan (COZAAR) 100 MG tablet, Take 1 tablet (100 mg total) by mouth daily, Disp: 10 tablet, Rfl: 0    mometasone-formoterol (Dulera) 200-5 MCG/ACT inhaler, Inhale 2 puffs 2 (two) times a day Rinse mouth after use., Disp: 13 g, Rfl: 1    multivitamin (THERAGRAN) TABS, Take 1 tablet by mouth daily, Disp: , Rfl:     naloxone (NARCAN) 4 mg/0.1 mL nasal spray, Administer 1 spray into a nostril. If no response after 2-3 minutes, give another dose in the other nostril using a new spray. (Patient not taking: Reported on 9/28/2023), Disp: 1 each, Rfl: 1    naloxone (NARCAN) 4 mg/0.1 mL nasal spray, Administer 1 spray into a nostril. If no response after 2-3 minutes, give another dose in the other nostril using a new spray.  (Patient not taking: Reported on 8/23/2023), Disp: 1 each, Rfl: 1    obinutuzumab (GAZYVA) 1000 mg/40 mL SOLN, Infuse into a venous catheter, Disp: , Rfl:     oxyCODONE (ROXICODONE) 10 MG TABS, Take 1 tablet (10 mg total) by mouth every 6 (six) hours as needed for moderate pain For ongoing pain control Max Daily Amount: 40 mg, Disp: 28 tablet, Rfl: 0    pantoprazole (PROTONIX) 40 mg tablet, Take 1 tablet (40 mg total) by mouth daily, Disp: 90 tablet, Rfl: 3    polyethylene glycol (GOLYTELY) 4000 mL solution, Take 4,000 mL by mouth once for 1 dose, Disp: 4000 mL, Rfl: 0    predniSONE 5 mg tablet, TAKE 1 TABLET DAILY, Disp: 90 tablet, Rfl: 5    sodium chloride, PF, 0.9 %, 10 mL by Intracatheter route see administration instructions 10mL into port before and after ampicillin doses (Patient not taking: Reported on 5/4/2023), Disp: , Rfl: 0    Ventolin  (90 Base) MCG/ACT inhaler, USE 2 INHALATIONS EVERY 6 HOURS AS NEEDED FOR WHEEZING, Disp: 54 g, Rfl: 5    zolpidem (AMBIEN) 5 mg tablet, Take 1 tablet (5 mg total) by mouth daily at bedtime as needed for sleep (Patient not taking: Reported on 9/28/2023), Disp: 30 tablet, Rfl: 0    Current Allergies     Allergies as of 11/12/2023 - Reviewed 11/12/2023   Allergen Reaction Noted    Heparin Other (See Comments) 06/06/2012    Macrolides and ketolides Other (See Comments) 01/13/2016    Simvastatin Myalgia 12/17/2021              Past Medical History:   Diagnosis Date    Allergic     Anemia     Arthritis     CAD (coronary artery disease) 2004    Cancer (HCC)     Leukemia    Cellulitis of scalp     CKD (chronic kidney disease) stage 3, GFR 30-59 ml/min (HCC)     CLL (chronic lymphocytic leukemia) (HCC)     COPD (chronic obstructive pulmonary disease) ([address])     Diabetes mellitus ([address])     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    Dyslipidemia     Edema extremities     Esophageal ulcer     H/O blood clots     Hemolytic anemia (HCC)     Hiatal hernia     History of TIA (transient ischemic attack)     Hyperlipidemia     Hypertension     Listeria infection 2018    Multiple gastric ulcers     Myocardial infarction ([address]) 2004    acute    Neutropenia (HCC)     Obese     Recurrent sinusitis     Thrombocytopenia (HCC)     Vertigo        Past Surgical History:   Procedure Laterality Date    ARTHROSCOPIC REPAIR ACL      lt knee    CARDIAC SURGERY      cardiac cath with stent    FOOT SURGERY      IR PORT CHECK  5/17/2019    KNEE ARTHROSCOPY      OTHER SURGICAL HISTORY      cardiac cath lesion 1, 1st adjunct treat device: stent    PORTACATH PLACEMENT      RI ESOPHAGOGASTRODUODENOSCOPY TRANSORAL DIAGNOSTIC N/A 10/5/2017    Procedure: ESOPHAGOGASTRODUODENOSCOPY (EGD) with bx;  Surgeon: Payal Aranda DO;  Location: [facility]; Service: Gastroenterology    RI ESOPHAGOGASTRODUODENOSCOPY TRANSORAL DIAGNOSTIC N/A 3/8/2018    Procedure: ESOPHAGOGASTRODUODENOSCOPY (EGD) with biopsy;  Surgeon: Payal Aranda DO;  Location: [facility]; Service: Gastroenterology    RI ESOPHAGOGASTRODUODENOSCOPY TRANSORAL DIAGNOSTIC N/A 6/20/2018    Procedure: ESOPHAGOGASTRODUODENOSCOPY (EGD) with Dilation;  Surgeon: Payal Aranda DO;  Location: [facility]; Service: Gastroenterology    RI ESOPHAGOGASTRODUODENOSCOPY TRANSORAL DIAGNOSTIC N/A 10/18/2018    Procedure: ESOPHAGOGASTRODUODENOSCOPY (EGD) with dilation;  Surgeon: Jolane Ahumada, MD;  Location: [facility]; Service: Gastroenterology    RI ESOPHAGOSCOPY FLEXIBLE TRANSORAL WITH BIOPSY N/A 9/2/2016    Procedure: ESOPHAGOGASTRODUODENOSCOPY (EGD); ESOPHAGEAL DILATION; ESOPHAGEAL BIOPSY;  Surgeon: Gogo Arcos MD;  Location: [facility];  Service: Thoracic    TONSILLECTOMY      UPPER GASTROINTESTINAL ENDOSCOPY      x 6       Family History   Problem Relation Age of Onset    Leukemia Mother         chronic    Colon polyps Mother         sidmoid    Parkinsonism Father          Medications have been verified. The following portions of the patient's history were reviewed and updated as appropriate: allergies, current medications, past family history, past medical history, past social history, past surgical history and problem list.    Objective   /68   Pulse 70   Temp 99 °F (37.2 °C)   Resp 20   Ht 6' (1.829 m)   Wt 105 kg (232 lb)   SpO2 97%   BMI 31.46 kg/m²      Physical Exam     Physical Exam  Musculoskeletal:         General: Swelling and tenderness present. No deformity or signs of injury.       Comments: TTP over the ulna side of distal wrist, Pain with lateral movements of the right wrist, no pain with flexion or extension of the right wrist, pt has some weakness in his hand grasp which is not new for patient, denies numbness and tingling to the right hand. Skin:     General: Skin is warm. Capillary Refill: Capillary refill takes less than 2 seconds. Neurological:      General: No focal deficit present. Mental Status: He is oriented to person, place, and time. Note: Portions of this record may have been created with voice recognition software. Occasional wrong word or "sound a like" substitutions may have occurred due to the inherent limitations of voice recognition software. Please read the chart carefully and recognize, using context, where substitutions have occurred. TEODORO Gonzales

## 2023-11-15 ENCOUNTER — HOSPITAL ENCOUNTER (OUTPATIENT)
Dept: INFUSION CENTER | Facility: CLINIC | Age: 68
Discharge: HOME | End: 2023-11-15
Payer: MEDICARE

## 2023-11-15 VITALS
BODY MASS INDEX: 31.72 KG/M2 | DIASTOLIC BLOOD PRESSURE: 92 MMHG | SYSTOLIC BLOOD PRESSURE: 163 MMHG | HEART RATE: 71 BPM | RESPIRATION RATE: 18 BRPM | TEMPERATURE: 97.2 F | WEIGHT: 233.91 LBS

## 2023-11-15 DIAGNOSIS — D80.1 HYPOGAMMAGLOBULINEMIA, ACQUIRED (HCC): Primary | ICD-10-CM

## 2023-11-15 DIAGNOSIS — C91.10 CLL (CHRONIC LYMPHOCYTIC LEUKEMIA) (HCC): ICD-10-CM

## 2023-11-15 PROCEDURE — 96367 TX/PROPH/DG ADDL SEQ IV INF: CPT

## 2023-11-15 PROCEDURE — 96366 THER/PROPH/DIAG IV INF ADDON: CPT

## 2023-11-15 PROCEDURE — 96365 THER/PROPH/DIAG IV INF INIT: CPT

## 2023-11-15 RX ORDER — ACETAMINOPHEN 325 MG/1
650 TABLET ORAL ONCE
OUTPATIENT
Start: 2023-12-12

## 2023-11-15 RX ORDER — SODIUM CHLORIDE 9 MG/ML
20 INJECTION, SOLUTION INTRAVENOUS ONCE
OUTPATIENT
Start: 2023-12-12

## 2023-11-15 RX ORDER — ACETAMINOPHEN 325 MG/1
650 TABLET ORAL ONCE
Status: COMPLETED | OUTPATIENT
Start: 2023-11-15 | End: 2023-11-15

## 2023-11-15 RX ORDER — SODIUM CHLORIDE 9 MG/ML
20 INJECTION, SOLUTION INTRAVENOUS ONCE
Status: COMPLETED | OUTPATIENT
Start: 2023-11-15 | End: 2023-11-15

## 2023-11-15 RX ADMIN — ACETAMINOPHEN 650 MG: 325 TABLET, FILM COATED ORAL at 08:17

## 2023-11-15 RX ADMIN — DEXAMETHASONE SODIUM PHOSPHATE 4 MG: 10 INJECTION, SOLUTION INTRAMUSCULAR; INTRAVENOUS at 08:17

## 2023-11-15 RX ADMIN — SODIUM CHLORIDE 20 ML/HR: 9 INJECTION, SOLUTION INTRAVENOUS at 08:30

## 2023-11-15 RX ADMIN — DIPHENHYDRAMINE HYDROCHLORIDE 12.5 MG: 50 INJECTION, SOLUTION INTRAMUSCULAR; INTRAVENOUS at 08:48

## 2023-11-15 RX ADMIN — Medication 22.5 G: at 09:13

## 2023-11-24 RX ORDER — SODIUM CHLORIDE 9 MG/ML
20 INJECTION, SOLUTION INTRAVENOUS ONCE
Status: CANCELLED | OUTPATIENT
Start: 2023-11-28

## 2023-11-24 RX ORDER — ACETAMINOPHEN 325 MG/1
650 TABLET ORAL ONCE
Status: CANCELLED | OUTPATIENT
Start: 2023-11-28

## 2023-11-28 ENCOUNTER — HOSPITAL ENCOUNTER (OUTPATIENT)
Dept: INFUSION CENTER | Facility: CLINIC | Age: 68
Discharge: HOME | End: 2023-11-28
Payer: MEDICARE

## 2023-11-28 VITALS
SYSTOLIC BLOOD PRESSURE: 169 MMHG | WEIGHT: 231.48 LBS | HEIGHT: 73 IN | RESPIRATION RATE: 18 BRPM | DIASTOLIC BLOOD PRESSURE: 81 MMHG | TEMPERATURE: 97.4 F | HEART RATE: 74 BPM | BODY MASS INDEX: 30.68 KG/M2

## 2023-11-28 DIAGNOSIS — E09.9 STEROID-INDUCED DIABETES MELLITUS, SEQUELA: ICD-10-CM

## 2023-11-28 DIAGNOSIS — C91.10 CLL (CHRONIC LYMPHOCYTIC LEUKEMIA) (HCC): Primary | ICD-10-CM

## 2023-11-28 DIAGNOSIS — T38.0X5S STEROID-INDUCED DIABETES MELLITUS, SEQUELA: ICD-10-CM

## 2023-11-28 LAB
ALBUMIN SERPL BCP-MCNC: 4.1 G/DL (ref 3.5–5)
ALP SERPL-CCNC: 43 U/L (ref 34–104)
ALT SERPL W P-5'-P-CCNC: 47 U/L (ref 7–52)
ANION GAP SERPL CALCULATED.3IONS-SCNC: 5 MMOL/L
AST SERPL W P-5'-P-CCNC: 33 U/L (ref 13–39)
BILIRUB SERPL-MCNC: 0.42 MG/DL (ref 0.2–1)
BUN SERPL-MCNC: 21 MG/DL (ref 5–25)
CALCIUM SERPL-MCNC: 9.2 MG/DL (ref 8.4–10.2)
CHLORIDE SERPL-SCNC: 107 MMOL/L (ref 96–108)
CO2 SERPL-SCNC: 27 MMOL/L (ref 21–32)
CREAT SERPL-MCNC: 1.09 MG/DL (ref 0.6–1.3)
ERYTHROCYTE [DISTWIDTH] IN BLOOD BY AUTOMATED COUNT: 13.7 % (ref 11.6–15.1)
GFR SERPL CREATININE-BSD FRML MDRD: 68 ML/MIN/1.73SQ M
GLUCOSE SERPL-MCNC: 160 MG/DL (ref 65–140)
HCT VFR BLD AUTO: 43.3 % (ref 36.5–49.3)
HGB BLD-MCNC: 14.2 G/DL (ref 12–17)
IGG SERPL-MCNC: 513 MG/DL (ref 635–1741)
MCH RBC QN AUTO: 30.7 PG (ref 26.8–34.3)
MCHC RBC AUTO-ENTMCNC: 32.8 G/DL (ref 31.4–37.4)
MCV RBC AUTO: 94 FL (ref 82–98)
PLATELET # BLD AUTO: 161 THOUSANDS/UL (ref 149–390)
PMV BLD AUTO: 11.6 FL (ref 8.9–12.7)
POTASSIUM SERPL-SCNC: 3.9 MMOL/L (ref 3.5–5.3)
PROT SERPL-MCNC: 6 G/DL (ref 6.4–8.4)
RBC # BLD AUTO: 4.62 MILLION/UL (ref 3.88–5.62)
RETICS # AUTO: NORMAL 10*3/UL (ref 14356–105094)
RETICS # CALC: 1.75 % (ref 0.37–1.87)
SODIUM SERPL-SCNC: 139 MMOL/L (ref 135–147)
WBC # BLD AUTO: 5.3 THOUSAND/UL (ref 4.31–10.16)

## 2023-11-28 PROCEDURE — 82784 ASSAY IGA/IGD/IGG/IGM EACH: CPT | Performed by: INTERNAL MEDICINE

## 2023-11-28 PROCEDURE — 85045 AUTOMATED RETICULOCYTE COUNT: CPT | Performed by: INTERNAL MEDICINE

## 2023-11-28 PROCEDURE — 85027 COMPLETE CBC AUTOMATED: CPT | Performed by: INTERNAL MEDICINE

## 2023-11-28 PROCEDURE — 96367 TX/PROPH/DG ADDL SEQ IV INF: CPT

## 2023-11-28 PROCEDURE — 85007 BL SMEAR W/DIFF WBC COUNT: CPT | Performed by: INTERNAL MEDICINE

## 2023-11-28 PROCEDURE — 96413 CHEMO IV INFUSION 1 HR: CPT

## 2023-11-28 PROCEDURE — 96415 CHEMO IV INFUSION ADDL HR: CPT

## 2023-11-28 PROCEDURE — 80053 COMPREHEN METABOLIC PANEL: CPT | Performed by: INTERNAL MEDICINE

## 2023-11-28 RX ORDER — INSULIN LISPRO 100 U/ML
INJECTION, SOLUTION SUBCUTANEOUS
Qty: 15 ML | Refills: 0 | Status: SHIPPED | OUTPATIENT
Start: 2023-11-28

## 2023-11-28 RX ORDER — ACETAMINOPHEN 325 MG/1
650 TABLET ORAL ONCE
Status: COMPLETED | OUTPATIENT
Start: 2023-11-28 | End: 2023-11-28

## 2023-11-28 RX ORDER — SODIUM CHLORIDE 9 MG/ML
20 INJECTION, SOLUTION INTRAVENOUS ONCE
Status: COMPLETED | OUTPATIENT
Start: 2023-11-28 | End: 2023-11-28

## 2023-11-28 RX ADMIN — OBINUTUZUMAB 1000 MG: 1000 INJECTION, SOLUTION, CONCENTRATE INTRAVENOUS at 09:14

## 2023-11-28 RX ADMIN — ACETAMINOPHEN 650 MG: 325 TABLET, FILM COATED ORAL at 08:30

## 2023-11-28 RX ADMIN — DEXAMETHASONE SODIUM PHOSPHATE 20 MG: 10 INJECTION, SOLUTION INTRAMUSCULAR; INTRAVENOUS at 08:28

## 2023-11-28 RX ADMIN — DIPHENHYDRAMINE HYDROCHLORIDE 25 MG: 50 INJECTION, SOLUTION INTRAMUSCULAR; INTRAVENOUS at 08:48

## 2023-11-28 RX ADMIN — SODIUM CHLORIDE 20 ML/HR: 9 INJECTION, SOLUTION INTRAVENOUS at 08:28

## 2023-11-28 NOTE — PROGRESS NOTES
Pt presents for gazyva. No new meds or concerns. Draw labs and proceed without results. Labs obtained per protocol. Pt tolerated treatment without adverse reaction. Future appointments discussed. AVS declined.

## 2023-11-29 ENCOUNTER — TELEPHONE (OUTPATIENT)
Age: 68
End: 2023-11-29

## 2023-11-29 LAB
BASOPHILS # BLD MANUAL: 0.05 THOUSAND/UL (ref 0–0.1)
BASOPHILS NFR MAR MANUAL: 1 % (ref 0–1)
EOSINOPHIL # BLD MANUAL: 0.32 THOUSAND/UL (ref 0–0.4)
EOSINOPHIL NFR BLD MANUAL: 6 % (ref 0–6)
LYMPHOCYTES # BLD AUTO: 1.22 THOUSAND/UL (ref 0.6–4.47)
LYMPHOCYTES # BLD AUTO: 23 % (ref 14–44)
MONOCYTES # BLD AUTO: 0.58 THOUSAND/UL (ref 0–1.22)
MONOCYTES NFR BLD: 11 % (ref 4–12)
NEUTROPHILS # BLD MANUAL: 3.13 THOUSAND/UL (ref 1.85–7.62)
NEUTS BAND NFR BLD MANUAL: 2 % (ref 0–8)
NEUTS SEG NFR BLD AUTO: 57 % (ref 43–75)
PLATELET BLD QL SMEAR: ADEQUATE
RBC MORPH BLD: NORMAL

## 2023-11-29 NOTE — TELEPHONE ENCOUNTER
Patients GI provider: Arin Medrano PA-C    Number to return call: [phone]    Reason for call: Pt calling inquiring about bowel prep. Sent prep over via [address] [place] [address].     Scheduled procedure/appointment date if applicable: Procedure 49/66/3719

## 2023-12-04 ENCOUNTER — TELEPHONE (OUTPATIENT)
Dept: HEMATOLOGY ONCOLOGY | Facility: CLINIC | Age: 68
End: 2023-12-04

## 2023-12-04 NOTE — TELEPHONE ENCOUNTER
Patient Call    Who are you speaking with? [facility]     If it is not the patient, are they listed on an active communication consent form? N/A   What is the reason for this call? Pt is having cataract surgery 12/14. They asked for the last office note and recent labs faxed to [phone]    Does this require a call back? N/A   If a call back is required, please list best call back number N/a   If a call back is required, advise that a message will be forwarded to their care team and someone will return their call as soon as possible. Did you relay this information to the patient?  N/A

## 2023-12-11 ENCOUNTER — OFFICE VISIT (OUTPATIENT)
Dept: CARDIOLOGY CLINIC | Facility: CLINIC | Age: 68
End: 2023-12-11
Payer: MEDICARE

## 2023-12-11 VITALS
WEIGHT: 233 LBS | OXYGEN SATURATION: 98 % | BODY MASS INDEX: 30.88 KG/M2 | HEART RATE: 80 BPM | SYSTOLIC BLOOD PRESSURE: 128 MMHG | HEIGHT: 73 IN | DIASTOLIC BLOOD PRESSURE: 80 MMHG

## 2023-12-11 DIAGNOSIS — I25.10 CORONARY ARTERY DISEASE INVOLVING NATIVE CORONARY ARTERY OF NATIVE HEART WITHOUT ANGINA PECTORIS: Primary | ICD-10-CM

## 2023-12-11 DIAGNOSIS — Z01.810 PREOP CARDIOVASCULAR EXAM: ICD-10-CM

## 2023-12-11 DIAGNOSIS — I10 PRIMARY HYPERTENSION: ICD-10-CM

## 2023-12-11 DIAGNOSIS — E78.2 MIXED HYPERLIPIDEMIA: ICD-10-CM

## 2023-12-11 PROCEDURE — 93000 ELECTROCARDIOGRAM COMPLETE: CPT | Performed by: INTERNAL MEDICINE

## 2023-12-11 PROCEDURE — 99213 OFFICE O/P EST LOW 20 MIN: CPT | Performed by: INTERNAL MEDICINE

## 2023-12-11 NOTE — ASSESSMENT & PLAN NOTE
The patient is scheduled for cataract surgery. EKG done today showed sinus mechanism with old inferior wall myocardial infarction. The EKG is stable. The patient is considered to be stable from the cardiac standpoint to proceed with the planned eye surgery.

## 2023-12-11 NOTE — PROGRESS NOTES
Assessment/Plan:    CAD (coronary artery disease)  Coronary artery disease, stable. No symptoms of angina. Hypertension  Hypertension, stable. Hyperlipidemia  Hyperlipidemia. Cholesterol is slightly higher than desirable. I will repeat the blood test.  Will continue present regimen for now. Preop cardiovascular exam  The patient is scheduled for cataract surgery. EKG done today showed sinus mechanism with old inferior wall myocardial infarction. The EKG is stable. The patient is considered to be stable from the cardiac standpoint to proceed with the planned eye surgery. Diagnoses and all orders for this visit:    Coronary artery disease involving native coronary artery of native heart without angina pectoris  -     POCT ECG    Mixed hyperlipidemia    Primary hypertension    Preop cardiovascular exam  -     POCT ECG          Subjective: Some dyspnea on exertion. Patient ID: Yin Villela is a 70 y.o. male. The patient presented to this office for the purpose of cardiac follow-up. He is known to have history of coronary artery disease with hypertension and hyperlipidemia. He is also known to have COPD. The patient has symptoms of mild dyspnea on exertion but denies any chest pain. He denies any symptoms of palpitation, dizziness or syncope. He has no leg edema. The following portions of the patient's history were reviewed and updated as appropriate: allergies, current medications, past family history, past medical history, past social history, past surgical history, and problem list.    Review of Systems   Respiratory:  Negative for apnea, cough, chest tightness, shortness of breath and wheezing. Cardiovascular:  Negative for chest pain, palpitations and leg swelling. Gastrointestinal:  Negative for abdominal pain. Neurological:  Negative for dizziness and light-headedness. Psychiatric/Behavioral: Negative. Objective: Stable cardiac wise.       /80 (BP Location: Left arm, Patient Position: Sitting, Cuff Size: Large)   Pulse 80   Ht 6' 0.99" (1.854 m)   Wt 106 kg (233 lb)   SpO2 98%   BMI 30.75 kg/m²          Physical Exam  Vitals reviewed. Constitutional:       General: He is not in acute distress. Appearance: He is well-developed. He is not diaphoretic. HENT:      Head: Normocephalic. Eyes:      Pupils: Pupils are equal, round, and reactive to light. Neck:      Thyroid: No thyromegaly. Vascular: No JVD. Cardiovascular:      Rate and Rhythm: Normal rate and regular rhythm. Heart sounds: S1 normal and S2 normal. No murmur heard. No friction rub. No gallop. Pulmonary:      Effort: Pulmonary effort is normal. No respiratory distress. Breath sounds: Normal breath sounds. No wheezing or rales. Chest:      Chest wall: No tenderness. Abdominal:      Palpations: Abdomen is soft. Musculoskeletal:         General: No tenderness or deformity. Normal range of motion. Cervical back: Normal range of motion. Right lower leg: No edema. Left lower leg: No edema. Skin:     General: Skin is warm and dry. Neurological:      Mental Status: He is alert and oriented to person, place, and time.    Psychiatric:         Mood and Affect: Mood normal.

## 2023-12-11 NOTE — PATIENT INSTRUCTIONS
The patient is advised that he is stable from the cardiac standpoint to proceed with surgery. He will continue present medications.

## 2023-12-11 NOTE — ASSESSMENT & PLAN NOTE
Hyperlipidemia. Cholesterol is slightly higher than desirable. I will repeat the blood test.  Will continue present regimen for now.

## 2023-12-12 ENCOUNTER — HOSPITAL ENCOUNTER (OUTPATIENT)
Dept: INFUSION CENTER | Facility: CLINIC | Age: 68
Discharge: HOME | End: 2023-12-12
Payer: MEDICARE

## 2023-12-12 VITALS
SYSTOLIC BLOOD PRESSURE: 169 MMHG | HEART RATE: 70 BPM | RESPIRATION RATE: 18 BRPM | WEIGHT: 233 LBS | DIASTOLIC BLOOD PRESSURE: 78 MMHG | BODY MASS INDEX: 30.75 KG/M2 | TEMPERATURE: 97.8 F

## 2023-12-12 DIAGNOSIS — C91.10 CLL (CHRONIC LYMPHOCYTIC LEUKEMIA) (HCC): ICD-10-CM

## 2023-12-12 DIAGNOSIS — D80.1 HYPOGAMMAGLOBULINEMIA, ACQUIRED (HCC): Primary | ICD-10-CM

## 2023-12-12 PROCEDURE — 96367 TX/PROPH/DG ADDL SEQ IV INF: CPT

## 2023-12-12 PROCEDURE — 96365 THER/PROPH/DIAG IV INF INIT: CPT

## 2023-12-12 PROCEDURE — 96366 THER/PROPH/DIAG IV INF ADDON: CPT

## 2023-12-12 RX ORDER — ACETAMINOPHEN 325 MG/1
650 TABLET ORAL ONCE
Status: COMPLETED | OUTPATIENT
Start: 2023-12-12 | End: 2023-12-12

## 2023-12-12 RX ORDER — ACETAMINOPHEN 325 MG/1
650 TABLET ORAL ONCE
OUTPATIENT
Start: 2023-12-13

## 2023-12-12 RX ORDER — SODIUM CHLORIDE 9 MG/ML
20 INJECTION, SOLUTION INTRAVENOUS ONCE
OUTPATIENT
Start: 2023-12-13

## 2023-12-12 RX ORDER — SODIUM CHLORIDE 9 MG/ML
20 INJECTION, SOLUTION INTRAVENOUS ONCE
Status: COMPLETED | OUTPATIENT
Start: 2023-12-12 | End: 2023-12-12

## 2023-12-12 RX ADMIN — DIPHENHYDRAMINE HYDROCHLORIDE 12.5 MG: 50 INJECTION, SOLUTION INTRAMUSCULAR; INTRAVENOUS at 08:46

## 2023-12-12 RX ADMIN — DEXAMETHASONE SODIUM PHOSPHATE 4 MG: 10 INJECTION, SOLUTION INTRAMUSCULAR; INTRAVENOUS at 08:23

## 2023-12-12 RX ADMIN — SODIUM CHLORIDE 20 ML/HR: 0.9 INJECTION, SOLUTION INTRAVENOUS at 08:16

## 2023-12-12 RX ADMIN — Medication 20 G: at 09:20

## 2023-12-12 RX ADMIN — ACETAMINOPHEN 650 MG: 325 TABLET ORAL at 08:23

## 2023-12-12 NOTE — PROGRESS NOTES
Cayetano Lucero  tolerated treatment well with no complications.       Thomas Nelson is aware of future appt on 12/26 at 8am.     AVS printed and given to Zeeshan Lucero:No (Declined by Thomas Nelson)

## 2023-12-13 DIAGNOSIS — C91.10 CLL (CHRONIC LYMPHOCYTIC LEUKEMIA) (HCC): Primary | ICD-10-CM

## 2023-12-13 DIAGNOSIS — D80.1 HYPOGAMMAGLOBULINEMIA, ACQUIRED (HCC): ICD-10-CM

## 2023-12-21 NOTE — H&P
History and Physical - [facility] Internal Medicine    Patient Information: Bella Mohr 60 y o  male MRN: [id number]  Unit/Bed#: [address] 2 [address] [id number] Encounter: [id number]  Admitting Physician: Luna Randhawa PA-C  PCP: Suze Whittaker MD  Date of Admission:  04/24/18    Assessment/Plan:    Hospital Problem List:     Principal Problem:    Fever  Active Problems:    CLL (chronic lymphocytic leukemia) ([address] )    Hyperlipidemia    Hypertension    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)    Pancytopenia (HCC)    Gastroesophageal reflux disease without esophagitis    Abdominal pain    DM    Plan for the Primary Problem(s):  · Fever in immunocompromised patient on chemo  · Admit to med/surg  Initial workup negative  Chest xray negative  Urine negative  Abdominal CT with possible focal colitis  Await blood cultures  Started on levaquin in ED, will continue for now  Plan for Additional Problems:   · CLL- continue chemotherapeutic medications  · Hyperlipidemia- on tricor  · HTN- not on meds at home  Monitor BP   · HIT- platelets 37L  Normally runs between low 30's and 50  Consult hematology  Last platelet transfusion was more than 1 year ago  · GERD- continue protonix  · Abdominal pain with vomiting and diarrhea- possible colitis on CT  Symptoms improved at present  Will monitor closely  · DM- mostly diet controlled  Uses insulin only before and after chemo    VTE Prophylaxis: Pharmacologic VTE Prophylaxis contraindicated due to HIT  / sequential compression device   Code Status: full code  POLST: There is no POLST form on file for this patient (pre-hospital)    Anticipated Length of Stay:  Patient will be admitted on an Observation basis with an anticipated length of stay of  Less than 2 midnights  Justification for Hospital Stay: patient requires close monitoring of blood work and cultures    Total Time for Visit, including Counseling / Coordination of Care: 45 minutes    Greater than 50% of this total time spent Today you were evaluated for abscess to the roof of your mouth  CT scan shows no bony abnormality or facial abscess  Take cephalexin 4 times a day and Flagyl/metronidazole twice a day for 7 days. These are antibiotics for infection management. Finish the entire course of antibiotics even if you are feeling better. Take with food and add a probiotic such as yogurt if you experience stomach upset  Take over the counter anti-inflammatories such as Motrin or ibuprofen or Advil at home.  This will help relieve pain, swelling, and inflammation.  You may take up to 400-600 mg every 4-6 hours as needed.  Do not exceed 3200 mg in a 24-hour period and take this with food or milk.  Take Tylenol or acetaminophen to help decrease your pain.  You may take 1-2 tabs of the 325 mg of Tylenol every 6 hours as needed.  Do not exceed 3000 mg in a 24-hour period.  [facility] for further care and management and reassessment  Come back to the ER with any increased or new or worsening symptoms, headaches, fevers, chest pain, shortness of breath, weakness or numbness or tingling in the extremities, vision changes  We hope you feel better soon!   on direct patient counseling and coordination of care  Chief Complaint:   Fever and chills    History of Present Illness:    Yuridia Garcia is a 60 y o  male who presents with fever and chills  Patient has known CLL and pancytopenia and is on chemo  He went to the infusion center today for his weekly bloodwork and was found to have a fever  He was initially sent to urgent care then was sent here for evaluation  He was given PO levaquin and discharged home with a script after his initial workup was negative  Once home he developed left sided abdominal pain and vomiting with diarrhea so he came back for repeat evaluation  He continues to have fever and chills  No sick contacts  Last hospitalization was in [facility] in March 2017  Review of Systems:    Review of Systems   Constitutional: Positive for chills and fever  HENT: Negative  Eyes: Negative  Respiratory: Negative  Cardiovascular: Negative  Gastrointestinal: Positive for abdominal pain, diarrhea and vomiting  Endocrine: Negative  Genitourinary: Negative  Musculoskeletal: Negative  Skin: Negative  Allergic/Immunologic: Negative  Neurological: Negative  Hematological: Negative  Psychiatric/Behavioral: Negative          Past Medical and Surgical History:     Past Medical History:   Diagnosis Date    CAD (coronary artery disease) 2004    Cellulitis of scalp     CKD (chronic kidney disease) stage 3, GFR 30-59 ml/min     [facility]ocytic [facility]) [address] [address]     COPD (chronic obstructive pulmonary disease) [facility] [facility]     Diabetes mellitus [facility] [address] [facility]     H/O blood clots     Hemolytic anemia (HCC)     History of TIA (transient ischemic attack)     Hyperlipidemia     Hypertension     Multiple gastric ulcers     Myocardial infarction ([facility] [address] [facility]     Recurrent sinusitis     Thrombocytopenia (HCC)     Vertigo        Past Surgical History:   Procedure Laterality Date    ARTHROSCOPIC REPAIR ACL      lt knee    CARDIAC SURGERY cardiac cath with stent    KNEE ARTHROSCOPY      PORTACATH PLACEMENT      AK ESOPHAGOGASTRODUODENOSCOPY TRANSORAL DIAGNOSTIC N/A 10/5/2017    Procedure: ESOPHAGOGASTRODUODENOSCOPY (EGD) with bx;  Surgeon: Dwayne Dejesus DO;  Location: [facility]; Service: Gastroenterology    AK ESOPHAGOGASTRODUODENOSCOPY TRANSORAL DIAGNOSTIC N/A 3/8/2018    Procedure: ESOPHAGOGASTRODUODENOSCOPY (EGD) with biopsy;  Surgeon: Dwayne Dejesus DO;  Location: [facility]; Service: Gastroenterology    AK ESOPHAGOSCOPY FLEXIBLE TRANSORAL WITH BIOPSY N/A 9/2/2016    Procedure: ESOPHAGOGASTRODUODENOSCOPY (EGD); ESOPHAGEAL DILATION; ESOPHAGEAL BIOPSY;  Surgeon: Esteban Rinaldi MD;  Location: [facility];  Service: Thoracic    TONSILLECTOMY      UPPER GASTROINTESTINAL ENDOSCOPY      x 6       Meds/Allergies:    Prior to Admission medications    Medication Sig Start Date End Date Taking?  Authorizing Provider   acetaminophen (TYLENOL) 325 mg tablet Take 2 tablets by mouth every 6 (six) hours as needed for fever (fever > 101 4) 11/3/17  Yes Newton Mcgregor MD   acyclovir (ZOVIRAX) 400 MG tablet Take 400 mg by mouth 2 (two) times a day   Yes Historical Provider, MD   aspirin 81 MG tablet Take 81 mg by mouth every other day Every other day    Yes Historical Provider, MD   citalopram (CeleXA) 40 mg tablet Take 40 mg by mouth daily   Yes Historical Provider, MD   fenofibrate (TRIGLIDE) 50 MG tablet Take 134 mg by mouth daily     Yes Historical Provider, MD   folic acid (FOLVITE) 1 mg tablet Take 1 mg by mouth daily   Yes Historical Provider, MD   gabapentin (NEURONTIN) 300 mg capsule Take 2 capsules by mouth 3 (three) times a day  Patient taking differently: Take 600 mg by mouth 2 (two) times a day   11/3/17  Yes Newton Mcgregor MD   Ibrutinib 140 MG CAPS Take 1 capsule by mouth daily 3/27/18  Yes Socorro Walker MD   multivitamin (THERAGRAN) TABS Take 1 tablet by mouth daily   Yes Historical Provider, MD   obinutuzumab (GAZYVA) 1000 mg/40 mL SOLN Infuse into a venous catheter   Yes Historical Provider, MD   pantoprazole (PROTONIX) 40 mg tablet Take 40 mg by mouth daily     Yes Historical Provider, MD   predniSONE 10 mg tablet Take 10 mg by mouth daily   Yes Historical Provider, MD   VENTOLIN  (90 Base) MCG/ACT inhaler INHALE 2 PUFFS 4 TIMES A DAY AS NEEDED 2/16/18  Yes Historical Provider, MD   glucose monitoring kit (FREESTYLE) monitoring kit 1 each by Does not apply route as needed ( ) E 11 9 9/20/17   Zulma Mir MD   insulin glargine (LANTUS) 100 units/mL subcutaneous injection Inject 15 Units under the skin every morning  Patient taking differently: Inject 12 Units under the skin every morning   9/20/17   Zulma Mir MD   insulin lispro (HumaLOG) 100 units/mL injection Inject 10 Units under the skin 3 (three) times a day with meals 10/14/17   Mauro Oshea MD   Lancets (FREESTYLE) lancets Use as instructed--test 2 times a day    E 11 9 9/20/17   Zulma Mir MD   levofloxacin (LEVAQUIN) 750 mg tablet Take 1 tablet (750 mg total) by mouth daily for 4 days 4/23/18 4/27/18  Bk Olivier MD     I have reviewed home medications with patient personally  Allergies:    Allergies   Allergen Reactions    Heparin Other (See Comments)     Other reaction(s): Blood Disorders  clot    Macrolides And Ketolides Other (See Comments)     Interacts with other meds he is taking       Social History:     Marital Status: /Civil Union   Occupation: retired  Patient Pre-hospital Living Situation: lives with family  Patient Pre-hospital Level of Mobility: ambulatory  Patient Pre-hospital Diet Restrictions: diabetic  Substance Use History:   History   Alcohol Use No     History   Smoking Status    Former Smoker   Smokeless Tobacco    Never Used     Comment: pt refuses to answer question     History   Drug Use No       Family History:    non-contributory    Physical Exam:     Vitals:   Blood Pressure: 118/62 (04/23/18 2353)  Pulse: 85 (04/23/18 2353)  Temperature: 99 4 °F (37 4 °C) (04/23/18 2353)  Temp Source: Tympanic (04/23/18 2353)  Respirations: 20 (04/23/18 2353)  SpO2: 94 % (04/23/18 2353)    Physical Exam   Constitutional: He is oriented to person, place, and time  He appears well-developed and well-nourished  No distress  HENT:   Head: Normocephalic and atraumatic  Eyes: Conjunctivae are normal  Pupils are equal, round, and reactive to light  Neck: Normal range of motion  Neck supple  No JVD present  No tracheal deviation present  No thyromegaly present  Cardiovascular: Normal rate and regular rhythm  Pulmonary/Chest: Effort normal and breath sounds normal  No respiratory distress  He has no wheezes  Abdominal: Soft  Bowel sounds are normal  He exhibits no distension  Tenderness left flank to left abdomen   Musculoskeletal: Normal range of motion  He exhibits no edema, tenderness or deformity  Lymphadenopathy:     He has no cervical adenopathy  Neurological: He is alert and oriented to person, place, and time  Skin: Skin is warm and dry  No rash noted  He is not diaphoretic  No erythema  No pallor  Psychiatric: He has a normal mood and affect  His behavior is normal    Vitals reviewed  Additional Data:     Lab Results: I have personally reviewed pertinent reports  Results from last 7 days  Lab Units 04/23/18  2118   WBC Thousand/uL 2 44*   HEMOGLOBIN g/dL 13 3   HEMATOCRIT % 38 6   PLATELETS Thousands/uL 26*   NEUTROS PCT % 81*   LYMPHS PCT % 12*   MONOS PCT % 7   EOS PCT % 0       Results from last 7 days  Lab Units 04/23/18  2118   SODIUM mmol/L 139   POTASSIUM mmol/L 3 8   CHLORIDE mmol/L 103   CO2 mmol/L 26   BUN mg/dL 18   CREATININE mg/dL 1 18   CALCIUM mg/dL 8 4   TOTAL PROTEIN g/dL 5 8*   BILIRUBIN TOTAL mg/dL 0 91   ALK PHOS U/L 44*   ALT U/L 42   AST U/L 26   GLUCOSE RANDOM mg/dL 126           Imaging: I have personally reviewed pertinent reports        Xr Chest 2 Views    Result Date: 4/23/2018  Narrative: CHEST INDICATION:   fever  History of CLL  Fever and head pain  COMPARISON:  Chest radiographs October 7, 2017 EXAM PERFORMED/VIEWS:  XR CHEST PA & LATERAL  The frontal view was performed utilizing dual energy radiographic technique  Images: 4 FINDINGS: Heart shadow appears unremarkable  Atherosclerotic vascular calcifications are noted  Right-sided [id number] catheter with tip at cavoatrial junction  The lungs are clear  No pneumothorax or pleural effusion  Osseous structures appear within normal limits for patient age  Impression: No acute cardiopulmonary disease  Workstation performed: [id number]     Xr Shoulder 2+ Vw Left    Result Date: 4/17/2018  Narrative: LEFT SHOULDER INDICATION:   M25 511: Pain in right shoulder M25 512: Pain in left shoulder  COMPARISON:  None VIEWS:  XR SHOULDER 2+ VW LEFT FINDINGS: There is no acute fracture or dislocation  Moderate osteoarthritis glenohumeral joint  No lytic or blastic lesions are seen  Soft tissues are unremarkable  Impression: 1  No acute osseous abnormality  2   Degenerative changes as described  Workstation performed: [id number]     Xr Shoulder 2+ Vw Right    Result Date: 4/17/2018  Narrative: RIGHT SHOULDER INDICATION:   M25 511: Pain in right shoulder M25 512: Pain in left shoulder  COMPARISON:  None VIEWS:  XR SHOULDER 2+ VW RIGHT FINDINGS: There is no acute fracture or dislocation  No significant degenerative changes  No lytic or blastic lesions are seen  Soft tissues are unremarkable  Right internal jugular chest port noted  Impression: No acute osseous abnormality  Workstation performed: [id number]     Ct Abdomen Pelvis With Contrast    Result Date: 4/23/2018  Narrative: CT ABDOMEN AND PELVIS WITH IV CONTRAST INDICATION:   luq pain/fever  COMPARISON: CT abdomen 8/16/2010 TECHNIQUE:  CT examination of the abdomen and pelvis was performed   Axial, sagittal, and coronal 2D reformatted images were created from the source data and submitted for interpretation  Radiation dose length product (DLP) for this visit:  791 mGy-cm   This examination, like all CT scans performed in the [facility], was performed utilizing techniques to minimize radiation dose exposure, including the use of iterative reconstruction and automated exposure control  IV Contrast:  100 mL of iohexol (OMNIPAQUE) Enteric Contrast:  Enteric contrast was not administered  FINDINGS: ABDOMEN LOWER CHEST:  Atelectatic changes are noted at the lung bases  LIVER/BILIARY TREE:  Unremarkable  GALLBLADDER:  There are gallstone(s) within the gallbladder, without pericholecystic inflammatory changes  SPLEEN:  The spleen is enlarged, measuring 17 5 cm in craniocaudal dimension  PANCREAS:  Unremarkable  ADRENAL GLANDS:  Unremarkable  KIDNEYS/URETERS:  Unremarkable  No hydronephrosis  STOMACH AND BOWEL:  There is a small hiatal hernia with thickening of the distal esophagus  Mild bowel wall thickening involving a segment of the transverse colon (series 601 image 31)  APPENDIX:  A normal appendix was visualized  ABDOMINOPELVIC CAVITY:  No ascites or free intraperitoneal air  No lymphadenopathy  VESSELS:  More significant atherosclerotic vascular calcifications are noted than would be expected for the patient's age  No evidence of aortic aneurysm  PELVIS REPRODUCTIVE ORGANS:  Unremarkable for patient's age  URINARY BLADDER:  Unremarkable  ABDOMINAL WALL/INGUINAL REGIONS:  Small bilateral fat-containing inguinal hernia  OSSEOUS STRUCTURES:  No acute fracture or destructive osseous lesion  Impression: 1  Splenomegaly measuring 17 5 cm in craniocaudal dimension  No focal splenic lesion  2   Cholelithiasis without other evidence of acute cholecystitis  3   Small hiatal hernia with circumferential thickening of the distal esophagus  Correlate for evidence of reflux esophagitis and consider endoscopy if clinically warranted   4   Mild bowel wall thickening of a portion of the transverse colon may represent peristalsis versus focal colitis  Correlate clinically for evidence of colitis  Workstation performed: [id number]       EKG, Pathology, and Other Studies Reviewed on Admission:   · EKG: NSR    Allscripts / Epic Records Reviewed: Yes     ** Please Note: This note has been constructed using a voice recognition system   **

## 2023-12-26 ENCOUNTER — TELEPHONE (OUTPATIENT)
Dept: ENDOCRINOLOGY | Facility: CLINIC | Age: 68
End: 2023-12-26

## 2023-12-27 DIAGNOSIS — T38.0X5S STEROID-INDUCED DIABETES MELLITUS, SEQUELA: ICD-10-CM

## 2023-12-27 DIAGNOSIS — E09.9 STEROID-INDUCED DIABETES MELLITUS, SEQUELA: ICD-10-CM

## 2023-12-27 RX ORDER — INSULIN LISPRO 100 U/ML
INJECTION, SOLUTION SUBCUTANEOUS
Qty: 15 ML | Refills: 0 | Status: SHIPPED | OUTPATIENT
Start: 2023-12-27

## 2023-12-27 NOTE — TELEPHONE ENCOUNTER
Patient is on Ashley 2 sensor and stating he is getting inaccurate reading 56 and finger stick is 100. I did recommend for him to change his sensor but he refused, I also asked if he is taking Vitamin C supplement and he said yes 500 mg daily and I did say that it may related to that. Patient would like to speak with you.

## 2023-12-27 NOTE — TELEPHONE ENCOUNTER
Please check with patient what problems he is having  If needed, I will place referral for Diabetes education

## 2023-12-27 NOTE — TELEPHONE ENCOUNTER
Patient called again and left message stating that he called the other day and again this morning and still has heard from you. He said he wondered if the message was sent. Just sending this message to let you know that the patient would still like to speak to you directly.    Thanks!

## 2023-12-28 DIAGNOSIS — E11.9 TYPE 2 DIABETES MELLITUS WITHOUT COMPLICATION, WITH LONG-TERM CURRENT USE OF INSULIN (HCC): ICD-10-CM

## 2023-12-28 DIAGNOSIS — Z79.4 TYPE 2 DIABETES MELLITUS WITHOUT COMPLICATION, WITH LONG-TERM CURRENT USE OF INSULIN (HCC): ICD-10-CM

## 2023-12-28 LAB
DME PARACHUTE DELIVERY DATE REQUESTED: NORMAL
DME PARACHUTE ITEM DESCRIPTION: NORMAL
DME PARACHUTE ITEM DESCRIPTION: NORMAL
DME PARACHUTE ORDER STATUS: NORMAL
DME PARACHUTE SUPPLIER NAME: NORMAL
DME PARACHUTE SUPPLIER PHONE: NORMAL

## 2023-12-28 RX ORDER — BLOOD-GLUCOSE METER
KIT MISCELLANEOUS
Qty: 300 STRIP | Refills: 1 | Status: SHIPPED | OUTPATIENT
Start: 2023-12-28

## 2023-12-29 ENCOUNTER — TELEPHONE (OUTPATIENT)
Dept: HEMATOLOGY ONCOLOGY | Facility: CLINIC | Age: 68
End: 2023-12-29

## 2023-12-29 NOTE — TELEPHONE ENCOUNTER
Patient Call    Who are you speaking with? Patient    If it is not the patient, are they listed on an active communication consent form? N/A   What is the reason for this call? Pt missed last infusion and asked if 1/9/24 infusion needs to be long   Does this require a call back? Yes   If a call back is required, please list best call back number [phone]    If a call back is required, advise that a message will be forwarded to their care team and someone will return their call as soon as possible.   Did you relay this information to the patient? Yes

## 2024-01-04 ENCOUNTER — OFFICE VISIT (OUTPATIENT)
Dept: ENDOCRINOLOGY | Facility: CLINIC | Age: 69
End: 2024-01-04
Payer: MEDICARE

## 2024-01-04 ENCOUNTER — TELEPHONE (OUTPATIENT)
Dept: ENDOCRINOLOGY | Facility: CLINIC | Age: 69
End: 2024-01-04

## 2024-01-04 VITALS
HEIGHT: 73 IN | WEIGHT: 239.2 LBS | HEART RATE: 78 BPM | DIASTOLIC BLOOD PRESSURE: 68 MMHG | SYSTOLIC BLOOD PRESSURE: 116 MMHG | BODY MASS INDEX: 31.7 KG/M2 | OXYGEN SATURATION: 97 %

## 2024-01-04 DIAGNOSIS — E11.65 TYPE 2 DIABETES MELLITUS WITH HYPERGLYCEMIA, WITH LONG-TERM CURRENT USE OF INSULIN (HCC): Primary | ICD-10-CM

## 2024-01-04 DIAGNOSIS — I10 PRIMARY HYPERTENSION: ICD-10-CM

## 2024-01-04 DIAGNOSIS — E78.2 MIXED HYPERLIPIDEMIA: ICD-10-CM

## 2024-01-04 DIAGNOSIS — Z79.4 TYPE 2 DIABETES MELLITUS WITH HYPERGLYCEMIA, WITH LONG-TERM CURRENT USE OF INSULIN (HCC): Primary | ICD-10-CM

## 2024-01-04 LAB — SL AMB POCT HEMOGLOBIN AIC: 6.5 (ref ?–6.5)

## 2024-01-04 PROCEDURE — 95251 CONT GLUC MNTR ANALYSIS I&R: CPT | Performed by: INTERNAL MEDICINE

## 2024-01-04 PROCEDURE — 99214 OFFICE O/P EST MOD 30 MIN: CPT | Performed by: INTERNAL MEDICINE

## 2024-01-04 PROCEDURE — 83036 HEMOGLOBIN GLYCOSYLATED A1C: CPT | Performed by: INTERNAL MEDICINE

## 2024-01-04 NOTE — ASSESSMENT & PLAN NOTE
Lab Results   Component Value Date    HGBA1C 6.5 01/04/2024   Overall fairly well-controlled on basal bolus insulin therapy-sugars higher when he receives steroids with his infusion every 2 weeks  He feels that the  has not been working recently-will send in prescription for new .  Used to use his phone to scan the sensor however states that the sal stopped working.  Advised to reach out to [employer] for tech-support    Prescription was sent for Dexcom however it was denied as he has just received a 90-day supply of [employer]estyle [employer] sensors

## 2024-01-04 NOTE — PROGRESS NOTES
Cayetano Lucero 68 y.o. male MRN: [id number]    Encounter: [id number]      Assessment/Plan     Problem List Items Addressed This Visit          Endocrine    Type 2 diabetes mellitus with hyperglycemia, with long-term current use of insulin (Formerly [facility]) - Primary       Lab Results   Component Value Date    HGBA1C 6.5 01/04/2024   Overall fairly well-controlled on basal bolus insulin therapy-sugars higher when he receives steroids with his infusion every 2 weeks  He feels that the  has not been working recently-will send in prescription for new .  Used to use his phone to scan the sensor however states that the sal stopped working.  Advised to reach out to [employer] for tech-support    Prescription was sent for Dexcom however it was denied as he has just received a 90-day supply of freestyle merary sensors         Relevant Orders    POCT hemoglobin A1c (Completed)    Comprehensive metabolic panel    Hemoglobin A1C       Cardiovascular and Mediastinum    Hypertension     -Continue current regimen         Relevant Orders    Albumin / creatinine urine ratio       Other    Hyperlipidemia    Relevant Orders    Lipid Panel with Direct LDL reflex        CC: Diabetes    History of Present Illness     HPI:  68-year-old male with type 2 diabetes on insulin therapy seen in follow-up   On Prednisone 5mg daily For CLL and chemo infusion every 2 weeks  Gets steroids with infusion every 2 weeks, last infusion dec   Hx metformin intolerance    Current regimen:   Tresiba 18 units daily   Admelog 12-14 units before meals    Has been having issues with his sensor/ for the past few weeks so has been monitoring fingersticks    Cayetano Lucero   Device used freestyle merary   Home use       Indication   Type 2 Diabetes    More than 72 hours of data was reviewed. Report to be scanned to chart.     Date Range: dec 13th- dec 26th 2023     Analysis of data:   Average Glucose: 168 Mg per DL  Coefficient of Variation:   36.5%  Time in Target Range: 65%  Time Above Range: 34%  Time Below Range: 1%    Interpretation of data: Overall well-controlled with occasional postmeal hyperglycemia-no significant hypoglycemia    No polyuria , polydipsia , c/o blurry vision , + numbness and tingling in feet   Some weight gain     Review of Systems    Historical Information   Past Medical History:   Diagnosis Date    Allergic     Anemia     Arthritis     CAD (coronary artery disease) 2004    Cancer (HCC)     Leukemia    Cellulitis of scalp     CKD (chronic kidney disease) stage 3, GFR 30-59 ml/min (HCC)     CLL (chronic lymphocytic leukemia) (HCC)     COPD (chronic obstructive pulmonary disease) (HCC)     Diabetes mellitus (HCC)     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    Dyslipidemia     Edema extremities     Esophageal ulcer     H/O blood clots     Hemolytic anemia (HCC)     Hiatal hernia     History of TIA (transient ischemic attack)     Hyperlipidemia     Hypertension     Listeria infection 2018    Multiple gastric ulcers     Myocardial infarction (HCC) 2004    acute    Neutropenia (HCC)     Obese     Recurrent sinusitis     Thrombocytopenia (HCC)     Vertigo      Past Surgical History:   Procedure Laterality Date    ARTHROSCOPIC REPAIR ACL      lt knee    CARDIAC SURGERY      cardiac cath with stent    FOOT SURGERY      IR PORT CHECK  5/17/2019    KNEE ARTHROSCOPY      OTHER SURGICAL HISTORY      cardiac cath lesion 1, 1st adjunct treat device: stent    PORTACATH PLACEMENT      KS ESOPHAGOGASTRODUODENOSCOPY TRANSORAL DIAGNOSTIC N/A 10/5/2017    Procedure: ESOPHAGOGASTRODUODENOSCOPY (EGD) with bx;  Surgeon: Winifred Hansen DO;  Location: [facility];  Service: Gastroenterology    KS ESOPHAGOGASTRODUODENOSCOPY TRANSORAL DIAGNOSTIC N/A 3/8/2018    Procedure: ESOPHAGOGASTRODUODENOSCOPY (EGD) with biopsy;  Surgeon: Winifred Hansen DO;  Location: [facility];  Service: Gastroenterology    KS ESOPHAGOGASTRODUODENOSCOPY TRANSORAL DIAGNOSTIC N/A 6/20/2018    Procedure:  ESOPHAGOGASTRODUODENOSCOPY (EGD) with Dilation;  Surgeon: Winifred Hansen DO;  Location: [facility];  Service: Gastroenterology    VT ESOPHAGOGASTRODUODENOSCOPY TRANSORAL DIAGNOSTIC N/A 10/18/2018    Procedure: ESOPHAGOGASTRODUODENOSCOPY (EGD) with dilation;  Surgeon: Edu Mejía MD;  Location: [facility];  Service: Gastroenterology    VT ESOPHAGOSCOPY FLEXIBLE TRANSORAL WITH BIOPSY N/A 2016    Procedure: ESOPHAGOGASTRODUODENOSCOPY (EGD); ESOPHAGEAL DILATION; ESOPHAGEAL BIOPSY;  Surgeon: Abdi Holcomb MD;  Location:  MAIN OR;  Service: Thoracic    TONSILLECTOMY      UPPER GASTROINTESTINAL ENDOSCOPY      x 6     Social History   Social History     Substance and Sexual Activity   Alcohol Use Yes    Alcohol/week: 2.0 standard drinks of alcohol    Types: 2 Cans of beer per week    Comment: social use as per [employer]     Social History     Substance and Sexual Activity   Drug Use Never     Social History     Tobacco Use   Smoking Status Former    Current packs/day: 0.00    Average packs/day: 2.0 packs/day for 33.0 years (66.0 ttl pk-yrs)    Types: Cigarettes    Start date:     Quit date:     Years since quittin.0    Passive exposure: Past   Smokeless Tobacco Never     Family History:   Family History   Problem Relation Age of Onset    Leukemia Mother         chronic    Colon polyps Mother         sidmoid    Parkinsonism Father        Meds/Allergies   Current Outpatient Medications   Medication Sig Dispense Refill    acyclovir (ZOVIRAX) 400 MG tablet TAKE 1 TABLET TWICE A DAY 60 tablet 11    Admelog SoloStar 100 units/mL injection pen 12-14 units before each meal 15 mL 0    albuterol (2.5 mg/3 mL) 0.083 % nebulizer solution Take 3 mL (2.5 mg total) by nebulization every 6 (six) hours as needed for wheezing or shortness of breath 180 mL 5    amLODIPine (NORVASC) 10 mg tablet TAKE 1 TABLET DAILY 90 tablet 5    aspirin 81 MG tablet Take 81 mg by mouth daily Every other day      benzonatate  (TESSALON) 200 MG capsule TAKE 1 CAPSULE THREE TIMES A DAY AS NEEDED FOR COUGH 20 capsule 51    Blood Glucose Monitoring Suppl (FreeStyle Freedom Lite) w/Device KIT Use 3 (three) times a day 1 kit 0    citalopram (CeleXA) 40 mg tablet TAKE 1 TABLET DAILY 90 tablet 1    Continuous Blood Gluc  (FreeStyle Ashley 2 Loco) JOE Use to scan sensor premeals and at bedtime 1 each 1    Continuous Blood Gluc Sensor (FreeStyle Ashley 2 Sensor) MISC Apply 1 sensor every 14 days. Use as directed with Freestyle Ashley 2 reader. 2 each 2    Diclofenac Sodium (VOLTAREN) 1 % Apply 4 g topically 4 (four) times a day 100 g 0    fenofibrate (TRICOR) 145 mg tablet TAKE 1 TABLET DAILY 90 tablet 5    folic acid (FOLVITE) 1 mg tablet Take 800 mcg by mouth daily       gabapentin (NEURONTIN) 600 MG tablet TAKE 1 TABLET DAILY 90 tablet 1    glucose blood (FREESTYLE LITE) test strip Check blood sugar 3 times a day 300 strip 1    Ibrutinib 140 MG TABS Take 140 mg by mouth daily 28 tablet 6    insulin degludec (Tresiba FlexTouch) 100 units/mL injection pen Inject 18 Units under the skin daily at bedtime 15 mL 3    Insulin Pen Needle (BD Pen Needle Inez U/F) 32G X 4 MM MISC Use 3 (three) times a day 300 each 0    Lancets (FREESTYLE) lancets Use as instructed--test 2 times a day    E 11.9 100 each 0    Lancets (freestyle) lancets TEST BLOOD SUGAR 3 TIMES A  each 1    LORazepam (ATIVAN) 0.5 mg tablet Take 1 tablet (0.5 mg total) by mouth daily as needed for anxiety 30 tablet 0    losartan (COZAAR) 100 MG tablet TAKE 1 TABLET DAILY 90 tablet 1    mometasone-formoterol (Dulera) 200-5 MCG/ACT inhaler Inhale 2 puffs 2 (two) times a day Rinse mouth after use. 13 g 1    multivitamin (THERAGRAN) TABS Take 1 tablet by mouth daily      naloxone (NARCAN) 4 mg/0.1 mL nasal spray Administer 1 spray into a nostril. If no response after 2-3 minutes, give another dose in the other nostril using a new spray. 1 each 1    obinutuzumab (GAZYVA) 1000  "mg/40 mL SOLN Infuse into a venous catheter      oxyCODONE (ROXICODONE) 10 MG TABS Take 1 tablet (10 mg total) by mouth every 6 (six) hours as needed for moderate pain For ongoing pain control Max Daily Amount: 40 mg 28 tablet 0    pantoprazole (PROTONIX) 40 mg tablet Take 1 tablet (40 mg total) by mouth daily 90 tablet 3    polyethylene glycol (GOLYTELY) 4000 mL solution Take 4,000 mL by mouth once for 1 dose 4000 mL 0    predniSONE 5 mg tablet TAKE 1 TABLET DAILY 90 tablet 5    Ventolin  (90 Base) MCG/ACT inhaler USE 2 INHALATIONS EVERY 6 HOURS AS NEEDED FOR WHEEZING 54 g 5    zolpidem (AMBIEN) 5 mg tablet Take 1 tablet (5 mg total) by mouth daily at bedtime as needed for sleep 30 tablet 0    bisacodyl (DULCOLAX) 5 mg EC tablet Take as directed as per written office instructions (Patient not taking: Reported on 1/4/2024) 2 tablet 0    losartan (COZAAR) 100 MG tablet Take 1 tablet (100 mg total) by mouth daily (Patient not taking: Reported on 1/4/2024) 10 tablet 0    naloxone (NARCAN) 4 mg/0.1 mL nasal spray Administer 1 spray into a nostril. If no response after 2-3 minutes, give another dose in the other nostril using a new spray. (Patient not taking: Reported on 8/23/2023) 1 each 1    sodium chloride, PF, 0.9 % 10 mL by Intracatheter route see administration instructions 10mL into port before and after ampicillin doses (Patient not taking: Reported on 5/4/2023)  0     No current facility-administered medications for this visit.     Allergies   Allergen Reactions    Heparin Other (See Comments)     Other reaction(s): Blood Disorders  clot    Macrolides And Ketolides Other (See Comments)     Interacts with other meds he is taking    Simvastatin Myalgia       Objective   Vitals: Blood pressure 116/68, pulse 78, height 6' 1\" (1.854 m), weight 109 kg (239 lb 3.2 oz), SpO2 97%.    Physical Exam  Vitals reviewed.   Constitutional:       General: He is not in acute distress.     Appearance: Normal appearance. He " is obese. He is not ill-appearing, toxic-appearing or diaphoretic.   HENT:      Head: Normocephalic and atraumatic.   Eyes:      General: No scleral icterus.     Extraocular Movements: Extraocular movements intact.   Cardiovascular:      Rate and Rhythm: Normal rate and regular rhythm.      Heart sounds: Normal heart sounds. No murmur heard.  Pulmonary:      Effort: Pulmonary effort is normal. No respiratory distress.      Breath sounds: Normal breath sounds. No wheezing or rales.   Abdominal:      General: There is no distension.      Palpations: Abdomen is soft.      Tenderness: There is no abdominal tenderness.   Musculoskeletal:      Cervical back: Neck supple.      Right lower leg: No edema.      Left lower leg: No edema.   Lymphadenopathy:      Cervical: No cervical adenopathy.   Skin:     General: Skin is warm and dry.   Neurological:      General: No focal deficit present.      Mental Status: He is alert and oriented to person, place, and time.      Gait: Gait normal.   Psychiatric:         Mood and Affect: Mood normal.         Behavior: Behavior normal.         Thought Content: Thought content normal.         Judgment: Judgment normal.         The history was obtained from the review of the chart, patient.    Lab Results:   Lab Results   Component Value Date/Time    Hemoglobin A1C 6.5 01/04/2024 09:38 AM    Hemoglobin A1C 6.7 (A) 08/23/2023 08:26 AM    Hemoglobin A1C 6.6 (A) 05/04/2023 08:49 AM    Hemoglobin A1C 7.0 (H) 01/10/2023 08:34 AM    WBC 5.30 11/28/2023 08:26 AM    WBC 5.72 10/31/2023 08:20 AM    WBC 4.47 10/03/2023 08:28 AM    Hemoglobin 14.2 11/28/2023 08:26 AM    Hemoglobin 13.5 10/31/2023 08:20 AM    Hemoglobin 13.9 10/03/2023 08:28 AM    Hematocrit 43.3 11/28/2023 08:26 AM    Hematocrit 41.2 10/31/2023 08:20 AM    Hematocrit 43.3 10/03/2023 08:28 AM    MCV 94 11/28/2023 08:26 AM    MCV 93 10/31/2023 08:20 AM    MCV 95 10/03/2023 08:28 AM    Platelets 161 11/28/2023 08:26 AM    Platelets 149  "10/31/2023 08:20 AM    Platelets 144 (L) 10/03/2023 08:28 AM    BUN 21 11/28/2023 08:26 AM    BUN 21 10/31/2023 08:20 AM    BUN 19 10/03/2023 08:28 AM    Potassium 3.9 11/28/2023 08:26 AM    Potassium 3.6 10/31/2023 08:20 AM    Potassium 3.9 10/03/2023 08:28 AM    Chloride 107 11/28/2023 08:26 AM    Chloride 108 10/31/2023 08:20 AM    Chloride 107 10/03/2023 08:28 AM    CO2 27 11/28/2023 08:26 AM    CO2 26 10/31/2023 08:20 AM    CO2 26 10/03/2023 08:28 AM    Creatinine 1.09 11/28/2023 08:26 AM    Creatinine 1.11 10/31/2023 08:20 AM    Creatinine 0.98 10/03/2023 08:28 AM    AST 33 11/28/2023 08:26 AM    AST 30 10/31/2023 08:20 AM    AST 27 10/03/2023 08:28 AM    ALT 47 11/28/2023 08:26 AM    ALT 39 10/31/2023 08:20 AM    ALT 41 10/03/2023 08:28 AM    Total Protein 6.0 (L) 11/28/2023 08:26 AM    Total Protein 6.0 (L) 10/31/2023 08:20 AM    Total Protein 6.0 (L) 10/03/2023 08:28 AM    Albumin 4.1 11/28/2023 08:26 AM    Albumin 4.0 10/31/2023 08:20 AM    Albumin 4.0 10/03/2023 08:28 AM    HDL, Direct 38 (L) 01/10/2023 08:34 AM    Triglycerides 156 (H) 01/10/2023 08:34 AM           Imaging Studies:     Portions of the record may have been created with voice recognition software. Occasional wrong word or \"sound a like\" substitutions may have occurred due to the inherent limitations of voice recognition software. Read the chart carefully and recognize, using context, where substitutions have occurred.    "

## 2024-01-04 NOTE — TELEPHONE ENCOUNTER
Micah Arroyo   [facility]  12/28/2023 ? 2:22PM  [facility] is unable to fulfill the order for the following reason: Patient has received same and similar. Thank you for submitting this order. The patient recently received or was shipped a supply order on 12/13 for Ashley 2 sensors. The tracking number is [id number] and can be tracked via the [facility] website. The patient can upgrade supplies on the next order fill in March. This order is being rejected. Thanks, and have a great day.?? . Assigned to Catalina Randolph.

## 2024-01-05 ENCOUNTER — TELEPHONE (OUTPATIENT)
Dept: ENDOCRINOLOGY | Facility: CLINIC | Age: 69
End: 2024-01-05

## 2024-01-05 LAB
DME PARACHUTE DELIVERY DATE REQUESTED: NORMAL
DME PARACHUTE DELIVERY NOTE: NORMAL
DME PARACHUTE ITEM DESCRIPTION: NORMAL
DME PARACHUTE ITEM DESCRIPTION: NORMAL
DME PARACHUTE ORDER STATUS: NORMAL
DME PARACHUTE SUPPLIER NAME: NORMAL
DME PARACHUTE SUPPLIER PHONE: NORMAL

## 2024-01-05 RX ORDER — ACETAMINOPHEN 325 MG/1
650 TABLET ORAL ONCE
OUTPATIENT
Start: 2024-01-05

## 2024-01-05 RX ORDER — SODIUM CHLORIDE 9 MG/ML
20 INJECTION, SOLUTION INTRAVENOUS ONCE
OUTPATIENT
Start: 2024-01-05

## 2024-01-05 NOTE — TELEPHONE ENCOUNTER
----- Message from Halie No MD sent at 1/4/2024 12:47 PM EST -----  Please send in a prescription of new freestyle merary 2  to his mail order pharmacy

## 2024-01-05 NOTE — TELEPHONE ENCOUNTER
Spoke with pt regarding sensors, he was able to get the Ashley to connect and is getting BS readings.  Regarding defective sensors, pt will contact company to have them replaced.  Informed pt should he have any additional questions or issues to contact office.

## 2024-01-08 DIAGNOSIS — G62.9 NEUROPATHY: ICD-10-CM

## 2024-01-08 RX ORDER — GABAPENTIN 600 MG/1
TABLET ORAL
Qty: 90 TABLET | Refills: 5 | Status: SHIPPED | OUTPATIENT
Start: 2024-01-08

## 2024-01-09 ENCOUNTER — OFFICE VISIT (OUTPATIENT)
Dept: HEMATOLOGY ONCOLOGY | Facility: CLINIC | Age: 69
End: 2024-01-09
Payer: MEDICARE

## 2024-01-09 ENCOUNTER — HOSPITAL ENCOUNTER (OUTPATIENT)
Dept: INFUSION CENTER | Facility: CLINIC | Age: 69
Discharge: HOME | End: 2024-01-09
Payer: MEDICARE

## 2024-01-09 VITALS
SYSTOLIC BLOOD PRESSURE: 149 MMHG | WEIGHT: 231.48 LBS | BODY MASS INDEX: 30.54 KG/M2 | DIASTOLIC BLOOD PRESSURE: 74 MMHG | RESPIRATION RATE: 18 BRPM | HEART RATE: 73 BPM | TEMPERATURE: 97.2 F

## 2024-01-09 VITALS
BODY MASS INDEX: 30.88 KG/M2 | DIASTOLIC BLOOD PRESSURE: 78 MMHG | OXYGEN SATURATION: 97 % | HEART RATE: 85 BPM | RESPIRATION RATE: 18 BRPM | TEMPERATURE: 96.9 F | WEIGHT: 233 LBS | SYSTOLIC BLOOD PRESSURE: 132 MMHG | HEIGHT: 73 IN

## 2024-01-09 DIAGNOSIS — E11.42 TYPE 2 DIABETES MELLITUS WITH DIABETIC POLYNEUROPATHY, WITH LONG-TERM CURRENT USE OF INSULIN (HCC): ICD-10-CM

## 2024-01-09 DIAGNOSIS — D69.6 THROMBOCYTOPENIA (HCC): ICD-10-CM

## 2024-01-09 DIAGNOSIS — D59.10 AUTOIMMUNE HEMOLYTIC ANEMIA (HCC): ICD-10-CM

## 2024-01-09 DIAGNOSIS — D80.1 HYPOGAMMAGLOBULINEMIA, ACQUIRED (HCC): Primary | ICD-10-CM

## 2024-01-09 DIAGNOSIS — I10 PRIMARY HYPERTENSION: ICD-10-CM

## 2024-01-09 DIAGNOSIS — J44.9 CHRONIC OBSTRUCTIVE PULMONARY DISEASE, UNSPECIFIED COPD TYPE (HCC): ICD-10-CM

## 2024-01-09 DIAGNOSIS — I25.10 CORONARY ARTERY DISEASE INVOLVING NATIVE CORONARY ARTERY OF NATIVE HEART WITHOUT ANGINA PECTORIS: ICD-10-CM

## 2024-01-09 DIAGNOSIS — Z79.4 TYPE 2 DIABETES MELLITUS WITH DIABETIC POLYNEUROPATHY, WITH LONG-TERM CURRENT USE OF INSULIN (HCC): ICD-10-CM

## 2024-01-09 DIAGNOSIS — D75.829 HIT (HEPARIN-INDUCED THROMBOCYTOPENIA) (HCC): ICD-10-CM

## 2024-01-09 DIAGNOSIS — C91.10 CLL (CHRONIC LYMPHOCYTIC LEUKEMIA) (HCC): Primary | ICD-10-CM

## 2024-01-09 DIAGNOSIS — N18.30 STAGE 3 CHRONIC KIDNEY DISEASE, UNSPECIFIED WHETHER STAGE 3A OR 3B CKD (HCC): ICD-10-CM

## 2024-01-09 DIAGNOSIS — D80.1 HYPOGAMMAGLOBULINEMIA, ACQUIRED (HCC): ICD-10-CM

## 2024-01-09 DIAGNOSIS — C91.10 CLL (CHRONIC LYMPHOCYTIC LEUKEMIA) (HCC): ICD-10-CM

## 2024-01-09 PROCEDURE — 99214 OFFICE O/P EST MOD 30 MIN: CPT | Performed by: INTERNAL MEDICINE

## 2024-01-09 RX ORDER — SODIUM CHLORIDE 9 MG/ML
20 INJECTION, SOLUTION INTRAVENOUS ONCE
OUTPATIENT
Start: 2024-02-06

## 2024-01-09 RX ORDER — ACETAMINOPHEN 325 MG/1
650 TABLET ORAL ONCE
Status: COMPLETED | OUTPATIENT
Start: 2024-01-09 | End: 2024-01-09

## 2024-01-09 RX ORDER — SODIUM CHLORIDE 9 MG/ML
20 INJECTION, SOLUTION INTRAVENOUS ONCE
Status: COMPLETED | OUTPATIENT
Start: 2024-01-09 | End: 2024-01-09

## 2024-01-09 RX ORDER — PREDNISONE 5 MG/1
5 TABLET ORAL DAILY
Qty: 90 TABLET | Refills: 3 | Status: SHIPPED | OUTPATIENT
Start: 2024-01-09

## 2024-01-09 RX ORDER — ACETAMINOPHEN 325 MG/1
650 TABLET ORAL ONCE
OUTPATIENT
Start: 2024-02-06

## 2024-01-09 RX ADMIN — DEXAMETHASONE SODIUM PHOSPHATE 4 MG: 10 INJECTION, SOLUTION INTRAMUSCULAR; INTRAVENOUS at 08:52

## 2024-01-09 RX ADMIN — SODIUM CHLORIDE 20 ML/HR: 0.9 INJECTION, SOLUTION INTRAVENOUS at 08:30

## 2024-01-09 RX ADMIN — Medication 20 G: at 09:27

## 2024-01-09 RX ADMIN — ACETAMINOPHEN 650 MG: 325 TABLET, FILM COATED ORAL at 08:29

## 2024-01-09 RX ADMIN — DIPHENHYDRAMINE HYDROCHLORIDE 12.5 MG: 50 INJECTION, SOLUTION INTRAMUSCULAR; INTRAVENOUS at 08:35

## 2024-01-09 NOTE — PROGRESS NOTES
Pt presents to infusion center today for IVIG.  Pt states he had to cancel his last Gazyva treatment two weeks ago because he was away for the holidays.  Pt states he will resume Gazyva treatment in two weeks from today.  Dr. Mejia is aware.

## 2024-01-09 NOTE — PROGRESS NOTES
HPI: Patient has been on 140 mg ibrutinib daily, Gazyva once a month and IVIG infusion once a month  for stage IV CLL.  CLL condition has remained stable on this program for a long time.  Also he is on 5 mg prednisone daily and folic acid.  He is on acyclovir.  He is taking calcium and vitamin D.    CLL with history of profound anemia, thrombocytopenia and low IgG level. CLL was diagnosed more than 20 years ago and he had multiple lines of therapies and at 1 time his hemoglobin was down to 4.9 because of  autoimmune hemolytic anemia and CLL.  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    He has tiredness, chronic nonproductive cough, exertional dyspnea, occasionally wheezing, arthritic symptoms and for that he is on oxycodone.  He has  vascular purpura on forearms.  He has chronic lung disease.  History of diabetes mellitus, hypertension and coronary artery disease.  .  For dysphagia he had EGD and dilatation in September 2020 and he follows with his gastroenterologist.    . . 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    He has coronary artery disease and has 1 stent and he takes 81 mg aspirin daily with food.    CLL was diagnosed several years ago. He had been through Fludara based chemotherapy and pentostatin based chemotherapy. He had increased hemolysis when he was on chemotherapy. His most recent chemotherapy treatment was Cytoxan, Oncovin, prednisone and Rituxan and last treatment was in December 2012.. In 2013 he was started on Rituxan, prednisone and folic acid because he started to hemolyse again. IVIG was tried unsuccessfully so far as hemolysis is concerned. Rituxan has controlled the hemolysis. ...  Information on the treatments from March 2017 onwards.  On 3/20/17 patient found to have hemoglobin of 6.2, ,000. He was admitted to [facility] for blood transfusion and  plan to transfer to [facility].   On 3/20/17 WBC count 195,000, hemoglobin 4.9, platelet count 65. Direct coomb's was positive with undetectable haptoglobin. He was given 2 units of PRBCs, Solu-Medrol 1 g ×3 days and IVIG 1 g/kg daily ×3 days. His hemoglobin continued to drop. Bone marrow biopsy on 3/21 showed marrow cellularity of greater than 95% almost replaced by small lymphocytes, lambda light chain restricted, CD20 positive, CD19 positive, CD23 positive partially expressing CD11c and CD25 and CD5 positive and negative for CD 79 and CD38.  He was treated with single dose of chlorambucil to control his CLL.  3/22 second dose of chlorambucil, also Rituxan 375 mg/M ² autoimmune hemolytic anemia. WBC continued to rise, 266,000.  Patient was initiated with Venetoclax 20 mg daily. Tumor lysis monitored every 8 hours; given aggressive hydration and Lasix and remained euvolemic.  Later venetoclax was changed to ibrutinib because of disease progression  Dec 2017- Imbruvica decreased to 140 mg PO daily due to thrombocytopenia .  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 He was discharged on IV ampicillin. Echo negative for vegetations.   He is doing very well on present treatment plan so far as CLL is concerned  Patient had cataract surgeries in December 2023  Patient will be going for EGD and colonoscopy.                      Current Outpatient Medications:     acyclovir (ZOVIRAX) 400 MG tablet, TAKE 1 TABLET TWICE A DAY, Disp: 60 tablet, Rfl: 11    Admelog SoloStar 100 units/mL injection pen, 12-14 units before each meal, Disp: 15 mL, Rfl: 0    albuterol (2.5 mg/3 mL) 0.083 % nebulizer solution, Take 3 mL (2.5 mg total) by nebulization every 6 (six) hours as needed for wheezing or shortness of breath, Disp: 180 mL, Rfl: 5    amLODIPine (NORVASC) 10 mg tablet, TAKE 1 TABLET DAILY, Disp: 90 tablet, Rfl: 5    aspirin 81 MG tablet, Take 81 mg by  mouth daily Every other day, Disp: , Rfl:     benzonatate (TESSALON) 200 MG capsule, TAKE 1 CAPSULE THREE TIMES A DAY AS NEEDED FOR COUGH, Disp: 20 capsule, Rfl: 51    Blood Glucose Monitoring Suppl (FreeStyle Freedom Lite) w/Device KIT, Use 3 (three) times a day, Disp: 1 kit, Rfl: 0    citalopram (CeleXA) 40 mg tablet, TAKE 1 TABLET DAILY, Disp: 90 tablet, Rfl: 1    Continuous Blood Gluc  (FreeStyle Ashley 2 [place]) JOE, Use to scan sensor premeals and at bedtime, Disp: 1 each, Rfl: 1    Continuous Blood Gluc Sensor (FreeStyle Ashley 2 Sensor) MISC, Apply 1 sensor every 14 days. Use as directed with Freestyle Ashley 2 reader., Disp: 2 each, Rfl: 2    Diclofenac Sodium (VOLTAREN) 1 %, Apply 4 g topically 4 (four) times a day, Disp: 100 g, Rfl: 0    fenofibrate (TRICOR) 145 mg tablet, TAKE 1 TABLET DAILY, Disp: 90 tablet, Rfl: 5    folic acid (FOLVITE) 1 mg tablet, Take 800 mcg by mouth daily , Disp: , Rfl:     gabapentin (NEURONTIN) 600 MG tablet, TAKE 1 TABLET DAILY, Disp: 90 tablet, Rfl: 5    glucose blood (FREESTYLE LITE) test strip, Check blood sugar 3 times a day, Disp: 300 strip, Rfl: 1    Ibrutinib 140 MG TABS, Take 140 mg by mouth daily, Disp: 28 tablet, Rfl: 6    insulin degludec (Tresiba FlexTouch) 100 units/mL injection pen, Inject 18 Units under the skin daily at bedtime, Disp: 15 mL, Rfl: 3    Insulin Pen Needle (BD Pen Needle Inez U/F) 32G X 4 MM MISC, Use 3 (three) times a day, Disp: 300 each, Rfl: 0    Lancets (FREESTYLE) lancets, Use as instructed--test 2 times a day  E 11.9, Disp: 100 each, Rfl: 0    Lancets (freestyle) lancets, TEST BLOOD SUGAR 3 TIMES A DAY, Disp: 300 each, Rfl: 1    LORazepam (ATIVAN) 0.5 mg tablet, Take 1 tablet (0.5 mg total) by mouth daily as needed for anxiety, Disp: 30 tablet, Rfl: 0    losartan (COZAAR) 100 MG tablet, TAKE 1 TABLET DAILY, Disp: 90 tablet, Rfl: 1    mometasone-formoterol (Dulera) 200-5 MCG/ACT inhaler, Inhale 2 puffs 2 (two) times a day Rinse mouth  after use., Disp: 13 g, Rfl: 1    multivitamin (THERAGRAN) TABS, Take 1 tablet by mouth daily, Disp: , Rfl:     naloxone (NARCAN) 4 mg/0.1 mL nasal spray, Administer 1 spray into a nostril. If no response after 2-3 minutes, give another dose in the other nostril using a new spray., Disp: 1 each, Rfl: 1    obinutuzumab (GAZYVA) 1000 mg/40 mL SOLN, Infuse into a venous catheter, Disp: , Rfl:     oxyCODONE (ROXICODONE) 10 MG TABS, Take 1 tablet (10 mg total) by mouth every 6 (six) hours as needed for moderate pain For ongoing pain control Max Daily Amount: 40 mg, Disp: 28 tablet, Rfl: 0    pantoprazole (PROTONIX) 40 mg tablet, Take 1 tablet (40 mg total) by mouth daily, Disp: 90 tablet, Rfl: 3    predniSONE 5 mg tablet, TAKE 1 TABLET DAILY, Disp: 90 tablet, Rfl: 5    Ventolin  (90 Base) MCG/ACT inhaler, USE 2 INHALATIONS EVERY 6 HOURS AS NEEDED FOR WHEEZING, Disp: 54 g, Rfl: 5    zolpidem (AMBIEN) 5 mg tablet, Take 1 tablet (5 mg total) by mouth daily at bedtime as needed for sleep, Disp: 30 tablet, Rfl: 0    bisacodyl (DULCOLAX) 5 mg EC tablet, Take as directed as per written office instructions (Patient not taking: Reported on 1/4/2024), Disp: 2 tablet, Rfl: 0    losartan (COZAAR) 100 MG tablet, Take 1 tablet (100 mg total) by mouth daily (Patient not taking: Reported on 1/4/2024), Disp: 10 tablet, Rfl: 0    naloxone (NARCAN) 4 mg/0.1 mL nasal spray, Administer 1 spray into a nostril. If no response after 2-3 minutes, give another dose in the other nostril using a new spray. (Patient not taking: Reported on 8/23/2023), Disp: 1 each, Rfl: 1    polyethylene glycol (GOLYTELY) 4000 mL solution, Take 4,000 mL by mouth once for 1 dose, Disp: 4000 mL, Rfl: 0    sodium chloride, PF, 0.9 %, 10 mL by Intracatheter route see administration instructions 10mL into port before and after ampicillin doses (Patient not taking: Reported on 5/4/2023), Disp: , Rfl: 0  No current facility-administered medications for this  visit.    Allergies   Allergen Reactions    Heparin Other (See Comments)     Other reaction(s): Blood Disorders  clot    Macrolides And Ketolides Other (See Comments)     Interacts with other meds he is taking    Simvastatin Myalgia       Oncology History   CLL (chronic lymphocytic leukemia) ([facility])   8/22/2017 -  Chemotherapy    chlorambucil (LEUKERAN), 0.5 mg/kg, Oral, Every 14 days, 6 of 6 cycles  obinutuzumab (GAZYVA) day 1 IVPB, 100 mg, Intravenous, Once, 1 of 1 cycle  obinutuzumab (GAZYVA) day 2 titrated infusion, 900 mg, Intravenous, Once, 1 of 1 cycle  obinutuzumab (GAZYVA) subsequent titrated infusion, 1,000 mg, Intravenous, Once, 64 of 68 cycles  Administration: 1,000 mg (5/21/2019), 1,000 mg (6/18/2019), 1,000 mg (7/16/2019), 1,000 mg (8/13/2019), 1,000 mg (9/10/2019), 1,000 mg (10/8/2019), 1,000 mg (11/5/2019), 1,000 mg (12/3/2019), 1,000 mg (12/31/2019), 1,000 mg (1/28/2020), 1,000 mg (2/25/2020), 1,000 mg (3/24/2020), 1,000 mg (4/21/2020), 1,000 mg (5/19/2020), 1,000 mg (6/16/2020), 1,000 mg (7/14/2020), 1,000 mg (8/11/2020), 1,000 mg (9/9/2020), 1,000 mg (10/6/2020), 1,000 mg (11/3/2020), 1,000 mg (12/1/2020), 1,000 mg (1/26/2021), 1,000 mg (12/29/2020), 1,000 mg (2/23/2021), 1,000 mg (3/23/2021), 1,000 mg (4/20/2021), 1,000 mg (5/18/2021), 1,000 mg (6/15/2021), 1,000 mg (7/13/2021), 1,000 mg (8/10/2021), 1,000 mg (9/7/2021), 1,000 mg (10/5/2021), 1,000 mg (11/2/2021), 1,000 mg (11/30/2021), 1,000 mg (12/28/2021), 1,000 mg (1/25/2022), 1,000 mg (2/22/2022), 1,000 mg (3/22/2022), 1,000 mg (5/17/2022), 1,000 mg (6/14/2022), 1,000 mg (7/12/2022), 1,000 mg (8/9/2022), 1,000 mg (9/6/2022), 1,000 mg (10/4/2022), 1,000 mg (11/1/2022), 1,000 mg (11/29/2022), 1,000 mg (1/24/2023), 1,000 mg (2/21/2023), 1,000 mg (3/21/2023), 1,000 mg (4/18/2023), 1,000 mg (5/16/2023), 1,000 mg (6/13/2023), 1,000 mg (7/11/2023), 1,000 mg (8/8/2023), 1,000 mg (9/5/2023), 1,000 mg (10/3/2023), 1,000 mg (10/31/2023), 1,000 mg  "(11/28/2023)     4/24/2018 Initial Diagnosis    CLL (chronic lymphocytic leukemia) (HCC)         ROS:  01/09/24 Reviewed 13 systems: See symptoms in HPI  Presently no other neurological, cardiac, pulmonary, GI and  symptoms other than listed in HPI.  Other symptoms are in HPI.  No other symptoms like fever, chills, bleeding, bone pains, skin rash, weight loss,  numbness,  claudication and gait problem. No frequent infections but had them before.  Not unusually sensitive to heat or cold. No swelling of the ankles. No swollen glands.  Patient is anxious.  Has vascular purpura on the arms.      /78 (BP Location: Left arm)   Pulse 85   Temp (!) 96.9 °F (36.1 °C) (Tympanic)   Resp 18   Ht 6' 0.99\" (1.854 m)   Wt 106 kg (233 lb)   SpO2 97%   BMI 30.75 kg/m²     Physical Exam:  Patient is alert and oriented.  Patient not in distress.  Vital signs as above.  There is no icterus.  There is no oral thrush.  There is no palpable neck mass.  Lung fields are clear to percussion and auscultation.  Heart rate is regular.  There is no palpable abdominal mass.  Liver and spleen are not palpably enlarged.  Abdomen is soft and nontender.  There is no ascites.  There is no edema of the ankles.  There is no calf tenderness.  There is no focal neurological deficit.  No skin rash other than vascular purpura both forearms and backs of hands.  No palpable lymphadenopathy in the neck and axillary areas.    No clubbing.  Patient is anxious.  Performance status 1.      CBC and differential  Status: Final result     CBC and differential  Order: [id number]  Status: Final result       Visible to patient: Yes (seen)       Next appt: 01/23/2024 at 08:00 AM in Infusion Therapy (AL INF CHAIR 1)       Dx: CLL (chronic lymphocytic leukemia) (HCC)    0 Result Notes            Component  Ref Range & Units 11/28/23  8:26 AM 10/31/23  8:20 AM 10/3/23  8:28 AM 9/5/23  8:22 AM 8/8/23  8:49 AM 7/11/23  8:26 AM 6/13/23  8:27 AM   WBC  4.31 - " 10.16 Thousand/uL 5.30 5.72 4.47 4.66 5.00 5.48 4.82   RBC  3.88 - 5.62 Million/uL 4.62 4.41 4.54 4.65 4.62 4.67 4.52   Hemoglobin  12.0 - 17.0 g/dL 14.2 13.5 13.9 14.1 14.0 14.3 14.0   Hematocrit  36.5 - 49.3 % 43.3 41.2 43.3 44.2 43.9 43.8 42.9   MCV  82 - 98 fL 94 93 95 95 95 94 95   MCH  26.8 - 34.3 pg 30.7 30.6 30.6 30.3 30.3 30.6 31.0   MCHC  31.4 - 37.4 g/dL 32.8 32.8 32.1 31.9 31.9 32.6 32.6   RDW  11.6 - 15.1 % 13.7 13.3 13.4 13.5 13.7 13.2 13.4   MPV  8.9 - 12.7 fL 11.6 11.7 11.6 11.6 11.2 11.5 11.5   Platelets  149 - 390 Thousands/uL 161 149 144 Low  148 Low  148 Low  153 169   nRBC   0 R    0 R   Lymphocytes Absolute   0.68 R    0.76 R   Monocytes Absolute   0.61 R    0.69 R   Eosinophils Absolute   0.24 R    0.23 R   Basophils Absolute   0.06 R    0.05 R   Neutrophils Relative   58 R    62 R   Immat GRANS %   7 High  R    2 R   Lymphocytes Relative   15 R    16 R   Monocytes Relative   14 High  R    14 High  R   Eosinophils Relative   5 R    5 R   Basophils Relative   1 R    1 R   Neutrophils Absolute   2.56 R    2.98 R   Immature Grans Absolute   0.32 High  R    0.11 R              Narrative    This is an appended report.  These results have been appended to a previously verified report.      Specimen Collected: 11/28/23  8:26 AM Last Resulted: 11/28/23  2:15 PM    Manual Differential(PHLEBS Do Not Order)  Status: Final result     Manual Differential(PHLEBS Do Not Order)  Order: [id number] - Reflex for Order [id number]  Status: Final result       Visible to patient: Yes (not seen)       Next appt: 01/23/2024 at 08:00 AM in Infusion Therapy (AL INF CHAIR 1)       Dx: CLL (chronic lymphocytic leukemia) (HCC)    0 Result Notes            Component  Ref Range & Units 11/28/23  8:26 AM 10/31/23  8:20 AM 10/3/23  8:28 AM 9/5/23  8:22 AM 8/8/23  8:49 AM 7/11/23  8:26 AM 6/13/23  8:27 AM   Segmented %  43 - 75 % 57 61  61 53 63    Bands %  0 - 8 % 2 2    1    Lymphocytes %  14 - 44 % 23 11 Low   17 26 16     Monocytes %  4 - 12 % 11 14 High   16 High  13 High  16 High     Eosinophils, %  0 - 6 % 6 10 High  5 5 7 High  4 5   Basophils %  0 - 1 % 1 1 1 1 1 0 1   Absolute Neutrophils  1.85 - 7.62 Thousand/uL 3.13 3.60  2.84 2.65 3.51    Lymphocytes Absolute  0.60 - 4.47 Thousand/uL 1.22 0.69  0.79 1.30 0.88    Monocytes Absolute  0.00 - 1.22 Thousand/uL 0.58 0.80  0.75 0.65 0.88    Eosinophils Absolute  0.00 - 0.40 Thousand/uL 0.32 0.57 High  0.24 R 0.23 0.35 0.22 0.23 R   Basophils Absolute  0.00 - 0.10 Thousand/uL 0.05 0.06 0.06 R 0.05 0.05 0.00 0.05 R   Total Counted          RBC Morphology Normal Normal  Normal Normal Normal    Platelet Estimate  Adequate Adequate Borderline Abnormal   Borderline Abnormal  Borderline Abnormal  Adequate               Specimen Collected: 11/28/23  8:26 AM Last Resulted: 11/29/23 12:22 AM                  Component  Ref Range & Units 11/28/23  8:26 AM 10/31/23  8:20 AM 10/3/23  8:28 AM 9/5/23  8:22 AM 8/8/23  8:49 AM 7/11/23  8:26 AM 6/13/23  8:27 AM   Retic Ct Abs  14,356 - 105,094 80,900 72,300 76,700 78,600 84,100 82,700 90,400   Retic Ct Pct  0.37 - 1.87 % 1.75 1.64 1.69 1.69 1.82 1.77 2.00 High                  ntains abnormal data IgG  Order: [id number]  Status: Final result       Visible to patient: Yes (seen)       Next appt: 01/23/2024 at 08:00 AM in Infusion Therapy (AL INF CHAIR 1)       Dx: CLL (chronic lymphocytic leukemia) ([facility])    0 Result Notes            Component  Ref Range & Units 11/28/23  8:26 AM 10/31/23  8:20 AM 10/3/23  8:28 AM 9/5/23  8:22 AM 8/8/23  8:49 AM 7/11/23  8:26 AM 6/13/23  8:27 AM   IGG  635 - 1,741 mg/dL 513 Low  470 Low  499 Low  511 Low  516.0 Low  R 546.0 Low  R 537.0 Low  R                                          Comprehensive metabolic panel  Status: Final result      Contains abnormal data Comprehensive metabolic panel  Order: [id number]  Status: Final result       Visible to patient: Yes (seen)       Next appt: 01/23/2024 at 08:00 AM in  Infusion Therapy (AL INF CHAIR 1)       Dx: CLL (chronic lymphocytic leukemia) (HCC)    0 Result Notes       1 HM Topic            Component  Ref Range & Units 11/28/23  8:26 AM 10/31/23  8:20 AM 10/3/23  8:28 AM 9/5/23  8:22 AM 8/8/23  8:49 AM 7/11/23  8:26 AM 6/13/23  8:27 AM   Sodium  135 - 147 mmol/L 139 140 137 139 139 136 138   Potassium  3.5 - 5.3 mmol/L 3.9 3.6 3.9 3.7 3.9 3.7 3.9   Chloride  96 - 108 mmol/L 107 108 107 106 108 105 107   CO2  21 - 32 mmol/L 27 26 26 27 26 26 26   ANION GAP  mmol/L 5 6 4 6 5 5 5 R   BUN  5 - 25 mg/dL 21 21 19 17 17 16 22   Creatinine  0.60 - 1.30 mg/dL 1.09 1.11 CM 0.98 CM 1.11 CM 1.12 CM 1.08 CM 1.03 CM   Comment: Standardized to IDMS reference method   Glucose  65 - 140 mg/dL 160 High  215 High   High   High    High   High  CM   Comment: If the patient is fasting, the ADA then defines impaired fasting glucose as > 100 mg/dL and diabetes as > or equal to 123 mg/dL.   Calcium  8.4 - 10.2 mg/dL 9.2 8.8 8.8 9.2 8.9 9.4 8.9   AST  13 - 39 U/L 33 30 27 36 31 25 28   ALT  7 - 52 U/L 47 39 CM 41 CM 43 CM 42 CM 34 CM 34 CM   Comment: Specimen collection should occur prior to Sulfasalazine administration due to the potential for falsely depressed results.   Alkaline Phosphatase  34 - 104 U/L 43 46 39 36 40 47 43   Total Protein  6.4 - 8.4 g/dL 6.0 Low  6.0 Low  6.0 Low  6.3 Low  6.2 Low  6.4 6.2 Low    Albumin  3.5 - 5.0 g/dL 4.1 4.0 4.0 4.2 4.2 4.2 4.1   Total Bilirubin  0.20 - 1.00 mg/dL 0.42 0.40 CM 0.52 CM 0.63 CM 0.43 CM 0.46 CM 0.48 CM   Comment: Use of this assay is not recommended for patients undergoing treatment with eltrombopag due to the potential for falsely elevated results.  N-acetyl-p-benzoquinone imine (metabolite of Acetaminophen) will generate erroneously low results in samples for patients that have taken an overdose of Acetaminophen.   eGFR  ml/min/1.73sq m 68 67 78 67 67 70 74                         .   Labs, Imaging, & Other  studies:     All pertinent labs and imaging studies were personally reviewed      Reviewed test results and discussed with patient.  .  Assessment and plan:   Patient has been on 140 mg ibrutinib daily, Gazyva once a month and IVIG infusion once a month  for stage IV CLL.  CLL condition has remained stable on this program for a long time.  Also he is on 5 mg prednisone daily and folic acid.  He is on acyclovir.  He is taking calcium and vitamin D.    CLL with history of profound anemia, thrombocytopenia and low IgG level. CLL was diagnosed more than 20 years ago and he had multiple lines of therapies and at 1 time his hemoglobin was down to 4.9 because of  autoimmune hemolytic anemia and CLL.  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    He has tiredness, chronic nonproductive cough, exertional dyspnea, occasionally wheezing, arthritic symptoms and for that he is on oxycodone.  He has  vascular purpura on forearms.  He has chronic lung disease.  History of diabetes mellitus, hypertension and coronary artery disease.  .  For dysphagia he had EGD and dilatation in September 2020 and he follows with his gastroenterologist.    . . 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    He has coronary artery disease and has 1 stent and he takes 81 mg aspirin daily with food.    CLL was diagnosed several years ago. He had been through Fludara based chemotherapy and pentostatin based chemotherapy. He had increased hemolysis when he was on chemotherapy. His most recent chemotherapy treatment was Cytoxan, Oncovin, prednisone and Rituxan and last treatment was in December 2012.. In 2013 he was started on Rituxan, prednisone and folic acid because he started to hemolyse again. IVIG was tried unsuccessfully so far as hemolysis is concerned. Rituxan has controlled the hemolysis. ...  Information on the treatments from  March 2017 onwards.  On 3/20/17 patient found to have hemoglobin of 6.2, ,000. He was admitted to [facility] for blood transfusion and plan to transfer to [facility].   On 3/20/17 WBC count 195,000, hemoglobin 4.9, platelet count 65. Direct coomb's was positive with undetectable haptoglobin. He was given 2 units of PRBCs, Solu-Medrol 1 g ×3 days and IVIG 1 g/kg daily ×3 days. His hemoglobin continued to drop. Bone marrow biopsy on 3/21 showed marrow cellularity of greater than 95% almost replaced by small lymphocytes, lambda light chain restricted, CD20 positive, CD19 positive, CD23 positive partially expressing CD11c and CD25 and CD5 positive and negative for CD 79 and CD38.  He was treated with single dose of chlorambucil to control his CLL.  3/22 second dose of chlorambucil, also Rituxan 375 mg/M ² autoimmune hemolytic anemia. WBC continued to rise, 266,000.  Patient was initiated with Venetoclax 20 mg daily. Tumor lysis monitored every 8 hours; given aggressive hydration and Lasix and remained euvolemic.  Later venetoclax was changed to ibrutinib because of disease progression  Dec 2017- Imbruvica decreased to 140 mg PO daily due to thrombocytopenia .  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 He was discharged on IV ampicillin. Echo negative for vegetations.   He is doing very well on present treatment plan so far as CLL is concerned   Patient had cataract surgeries in December 2023  Patient will be going for EGD and colonoscopy.      Physical examination and test results are as recorded and discussed in  detail.    Hematologically stable.  No change in therapy.  All discussed in detail.  Questions answered.    Discussed the importance of eating healthy foods, staying active and health screening tests.  Patient to avoid trauma and falls. Goal is prolongation of survival and prevention of complications.  Patient is  capable of self-care.  Discussed precautions against coronavirus and other infections.  Patient is immunocompromised .   .  See diagnoses, orders and instructions    1. CLL (chronic lymphocytic leukemia) (HCC)    - predniSONE 5 mg tablet; Take 1 tablet (5 mg total) by mouth daily  Dispense: 90 tablet; Refill: 3  - CBC and differential; Standing  - Comprehensive metabolic panel; Standing  - IgG; Standing  - CBC and differential  - Comprehensive metabolic panel  - IgG    2. Autoimmune hemolytic anemia (HCC)    - CBC and differential; Standing  - Comprehensive metabolic panel; Standing  - IgG; Standing  - CBC and differential  - Comprehensive metabolic panel  - IgG    3. Hypogammaglobulinemia, acquired (HCC)    - CBC and differential; Standing  - Comprehensive metabolic panel; Standing  - IgG; Standing  - CBC and differential  - Comprehensive metabolic panel  - IgG    4. 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[facility])      5. Thrombocytopenia ([facility])      6. Chronic obstructive pulmonary disease, unspecified COPD type ([facility])      7. Type 2 diabetes mellitus with diabetic polyneuropathy, with long-term current use of insulin (HCC)    - Comprehensive metabolic panel; Standing  - Comprehensive metabolic panel    8. Coronary artery disease involving native coronary artery of native heart without angina pectoris      9. Primary hypertension      10. Stage 3 chronic kidney disease, unspecified whether stage 3a or 3b CKD ([facility])    - Comprehensive metabolic panel; Standing  - Comprehensive metabolic panel      Renewed prednisone.  Ordered blood work every month.  No change in the ibrutinib, Gazyva and IVIG therapy.  Patient will stop ibrutinib 1 week before and 1 week after GI procedure.  Follow-up in 3 months.                     .  Patient will continue to follow with his primary physician and other consultants      I used dictation device to dictate this note and there could be mistakes in my note and for that he may call my  office      Patient voiced understanding and agreed      Counseling / Coordination of Care  .  Provided counseling and support

## 2024-01-09 NOTE — PATIENT INSTRUCTIONS
Renewed prednisone.  Ordered blood work every month.  No change in the ibrutinib, Gazyva and IVIG therapy.  Patient will stop ibrutinib 1 week before and 1 week after GI procedure.  Follow-up in 3 months.

## 2024-01-09 NOTE — PROGRESS NOTES
Pt tolerated IVIG today without incident.  Pt declined AVS but is aware of future appt on Jan. 23rd at 0800.

## 2024-01-15 ENCOUNTER — TELEPHONE (OUTPATIENT)
Dept: GASTROENTEROLOGY | Facility: CLINIC | Age: 69
End: 2024-01-15

## 2024-01-15 NOTE — TELEPHONE ENCOUNTER
----- Message from Jose David Quezada sent at 1/15/2024  2:50 PM EST -----  Regarding: FW: Taking premeds  Contact: [phone]    ----- Message -----  From: Fina Dawson  Sent: 1/15/2024   2:41 PM EST  To: #  Subject: FW: Taking premeds                               Patient would like colonoscopy prep and DM med list mailed to his house.     ----- Message -----  From: Cayetano Lucero  Sent: 1/15/2024  11:25 AM EST  To: [facility]  Subject: Taking premeds                                   I got the message on my premeds. I had eye surgery and it hard to see small print still.can you pleas send me a hard copy to my home..  Thank you   Cayetano Lucero

## 2024-01-16 RX ORDER — ACETAMINOPHEN 325 MG/1
650 TABLET ORAL ONCE
Status: CANCELLED | OUTPATIENT
Start: 2024-02-20

## 2024-01-16 RX ORDER — SODIUM CHLORIDE 9 MG/ML
20 INJECTION, SOLUTION INTRAVENOUS ONCE
Status: CANCELLED | OUTPATIENT
Start: 2024-02-20

## 2024-01-17 DIAGNOSIS — C91.10 CLL (CHRONIC LYMPHOCYTIC LEUKEMIA) (HCC): Primary | ICD-10-CM

## 2024-01-17 RX ORDER — ACETAMINOPHEN 325 MG/1
650 TABLET ORAL ONCE
Status: CANCELLED | OUTPATIENT
Start: 2024-01-23

## 2024-01-17 RX ORDER — SODIUM CHLORIDE 9 MG/ML
20 INJECTION, SOLUTION INTRAVENOUS ONCE
Status: CANCELLED | OUTPATIENT
Start: 2024-01-23

## 2024-01-17 NOTE — TELEPHONE ENCOUNTER
If refusing colonoscopy prep can discuss alternative screening methods at his follow up. Okay to proceed with EGD alone.

## 2024-01-17 NOTE — TELEPHONE ENCOUNTER
Pt cancelled his upcoming procedures. Pt stated that he does not want to complete the prep. He doesn't mind still going forward with the EGD. Please reach out to reschedule his EGD as he is scheduled in [place] with Dr. Hansen.

## 2024-01-23 ENCOUNTER — HOSPITAL ENCOUNTER (OUTPATIENT)
Dept: INFUSION CENTER | Facility: CLINIC | Age: 69
Discharge: HOME | End: 2024-01-23
Payer: MEDICARE

## 2024-01-23 VITALS
SYSTOLIC BLOOD PRESSURE: 164 MMHG | WEIGHT: 231.48 LBS | HEIGHT: 73 IN | RESPIRATION RATE: 18 BRPM | HEART RATE: 80 BPM | DIASTOLIC BLOOD PRESSURE: 84 MMHG | TEMPERATURE: 97.4 F | BODY MASS INDEX: 30.68 KG/M2

## 2024-01-23 DIAGNOSIS — N18.30 STAGE 3 CHRONIC KIDNEY DISEASE, UNSPECIFIED WHETHER STAGE 3A OR 3B CKD (HCC): ICD-10-CM

## 2024-01-23 DIAGNOSIS — E11.42 TYPE 2 DIABETES MELLITUS WITH DIABETIC POLYNEUROPATHY, WITH LONG-TERM CURRENT USE OF INSULIN (HCC): ICD-10-CM

## 2024-01-23 DIAGNOSIS — D80.1 HYPOGAMMAGLOBULINEMIA, ACQUIRED (HCC): ICD-10-CM

## 2024-01-23 DIAGNOSIS — D59.10 AUTOIMMUNE HEMOLYTIC ANEMIA (HCC): ICD-10-CM

## 2024-01-23 DIAGNOSIS — C91.10 CLL (CHRONIC LYMPHOCYTIC LEUKEMIA) (HCC): Primary | ICD-10-CM

## 2024-01-23 DIAGNOSIS — Z79.4 TYPE 2 DIABETES MELLITUS WITH DIABETIC POLYNEUROPATHY, WITH LONG-TERM CURRENT USE OF INSULIN (HCC): ICD-10-CM

## 2024-01-23 LAB
ALBUMIN SERPL BCP-MCNC: 4.1 G/DL (ref 3.5–5)
ALP SERPL-CCNC: 38 U/L (ref 34–104)
ALT SERPL W P-5'-P-CCNC: 40 U/L (ref 7–52)
ANION GAP SERPL CALCULATED.3IONS-SCNC: 3 MMOL/L
AST SERPL W P-5'-P-CCNC: 30 U/L (ref 13–39)
BASOPHILS # BLD AUTO: 0.09 THOUSANDS/ÂΜL (ref 0–0.1)
BASOPHILS NFR BLD AUTO: 2 % (ref 0–1)
BILIRUB SERPL-MCNC: 0.45 MG/DL (ref 0.2–1)
BUN SERPL-MCNC: 18 MG/DL (ref 5–25)
CALCIUM SERPL-MCNC: 9.7 MG/DL (ref 8.4–10.2)
CHLORIDE SERPL-SCNC: 107 MMOL/L (ref 96–108)
CO2 SERPL-SCNC: 28 MMOL/L (ref 21–32)
CREAT SERPL-MCNC: 1.17 MG/DL (ref 0.6–1.3)
EOSINOPHIL # BLD AUTO: 0.33 THOUSAND/ÂΜL (ref 0–0.61)
EOSINOPHIL NFR BLD AUTO: 6 % (ref 0–6)
ERYTHROCYTE [DISTWIDTH] IN BLOOD BY AUTOMATED COUNT: 13.6 % (ref 11.6–15.1)
GFR SERPL CREATININE-BSD FRML MDRD: 63 ML/MIN/1.73SQ M
GLUCOSE SERPL-MCNC: 225 MG/DL (ref 65–140)
HCT VFR BLD AUTO: 44.2 % (ref 36.5–49.3)
HGB BLD-MCNC: 14.4 G/DL (ref 12–17)
IGG SERPL-MCNC: 529 MG/DL (ref 635–1741)
IMM GRANULOCYTES # BLD AUTO: 0.12 THOUSAND/UL (ref 0–0.2)
IMM GRANULOCYTES NFR BLD AUTO: 2 % (ref 0–2)
LYMPHOCYTES # BLD AUTO: 0.81 THOUSANDS/ÂΜL (ref 0.6–4.47)
LYMPHOCYTES NFR BLD AUTO: 15 % (ref 14–44)
MCH RBC QN AUTO: 30.8 PG (ref 26.8–34.3)
MCHC RBC AUTO-ENTMCNC: 32.6 G/DL (ref 31.4–37.4)
MCV RBC AUTO: 94 FL (ref 82–98)
MONOCYTES # BLD AUTO: 0.87 THOUSAND/ÂΜL (ref 0.17–1.22)
MONOCYTES NFR BLD AUTO: 16 % (ref 4–12)
NEUTROPHILS # BLD AUTO: 3.12 THOUSANDS/ÂΜL (ref 1.85–7.62)
NEUTS SEG NFR BLD AUTO: 59 % (ref 43–75)
NRBC BLD AUTO-RTO: 0 /100 WBCS
PLATELET # BLD AUTO: 178 THOUSANDS/UL (ref 149–390)
PMV BLD AUTO: 11.7 FL (ref 8.9–12.7)
POTASSIUM SERPL-SCNC: 4 MMOL/L (ref 3.5–5.3)
PROT SERPL-MCNC: 6.2 G/DL (ref 6.4–8.4)
RBC # BLD AUTO: 4.68 MILLION/UL (ref 3.88–5.62)
RETICS # AUTO: NORMAL 10*3/UL (ref 14356–105094)
RETICS # CALC: 1.74 % (ref 0.37–1.87)
SODIUM SERPL-SCNC: 138 MMOL/L (ref 135–147)
WBC # BLD AUTO: 5.34 THOUSAND/UL (ref 4.31–10.16)

## 2024-01-23 PROCEDURE — 85045 AUTOMATED RETICULOCYTE COUNT: CPT | Performed by: INTERNAL MEDICINE

## 2024-01-23 PROCEDURE — 96413 CHEMO IV INFUSION 1 HR: CPT

## 2024-01-23 PROCEDURE — 80053 COMPREHEN METABOLIC PANEL: CPT

## 2024-01-23 PROCEDURE — 96367 TX/PROPH/DG ADDL SEQ IV INF: CPT

## 2024-01-23 PROCEDURE — 96415 CHEMO IV INFUSION ADDL HR: CPT

## 2024-01-23 PROCEDURE — 82784 ASSAY IGA/IGD/IGG/IGM EACH: CPT | Performed by: INTERNAL MEDICINE

## 2024-01-23 PROCEDURE — 85025 COMPLETE CBC W/AUTO DIFF WBC: CPT

## 2024-01-23 RX ORDER — ACETAMINOPHEN 325 MG/1
650 TABLET ORAL ONCE
Status: COMPLETED | OUTPATIENT
Start: 2024-01-23 | End: 2024-01-23

## 2024-01-23 RX ORDER — SODIUM CHLORIDE 9 MG/ML
20 INJECTION, SOLUTION INTRAVENOUS ONCE
Status: COMPLETED | OUTPATIENT
Start: 2024-01-23 | End: 2024-01-23

## 2024-01-23 RX ADMIN — OBINUTUZUMAB 1000 MG: 1000 INJECTION, SOLUTION, CONCENTRATE INTRAVENOUS at 09:17

## 2024-01-23 RX ADMIN — DEXAMETHASONE SODIUM PHOSPHATE 20 MG: 10 INJECTION, SOLUTION INTRAMUSCULAR; INTRAVENOUS at 08:48

## 2024-01-23 RX ADMIN — DIPHENHYDRAMINE HYDROCHLORIDE 25 MG: 50 INJECTION, SOLUTION INTRAMUSCULAR; INTRAVENOUS at 08:28

## 2024-01-23 RX ADMIN — ACETAMINOPHEN 650 MG: 325 TABLET, FILM COATED ORAL at 08:31

## 2024-01-23 RX ADMIN — SODIUM CHLORIDE 20 ML/HR: 9 INJECTION, SOLUTION INTRAVENOUS at 08:21

## 2024-01-23 NOTE — PROGRESS NOTES
Pt presents for gazyva. No new meds or concerns. Draw labs and proceed  without results. Pt tolerated treatment without adverse reaction. Future appointments discussed, confirmed with patient for 2/6/2024 0800. AVS declined.

## 2024-01-23 NOTE — PLAN OF CARE
Problem: Knowledge Deficit  Goal: Patient/family/caregiver demonstrates understanding of disease process, treatment plan, medications, and discharge instructions  Description: Complete learning assessment and assess knowledge base.  Interventions:  - Provide teaching at level of understanding  - Provide teaching via preferred learning methods  Outcome: Progressing     
Alert and oriented to person, place and time

## 2024-01-24 DIAGNOSIS — R13.19 ESOPHAGEAL DYSPHAGIA: ICD-10-CM

## 2024-01-24 DIAGNOSIS — K21.9 GASTROESOPHAGEAL REFLUX DISEASE WITHOUT ESOPHAGITIS: ICD-10-CM

## 2024-01-24 RX ORDER — PANTOPRAZOLE SODIUM 40 MG/1
40 TABLET, DELAYED RELEASE ORAL DAILY
Qty: 90 TABLET | Refills: 1 | Status: SHIPPED | OUTPATIENT
Start: 2024-01-24

## 2024-01-24 NOTE — TELEPHONE ENCOUNTER
Reason for call:   [x] Refill   [] Prior Auth  [] Other:     Office:   [] PCP/Provider -   [x] Specialty/Provider - GI    PANTOPRAZOLE  40 MG    [facility]  [address] [place] [address] PATRICK SILVERMAN    Does the patient have enough for 3 days?   [x] Yes   [] No - Send as HP to POD

## 2024-01-26 DIAGNOSIS — E09.9 STEROID-INDUCED DIABETES MELLITUS, SEQUELA: ICD-10-CM

## 2024-01-26 DIAGNOSIS — T38.0X5S STEROID-INDUCED DIABETES MELLITUS, SEQUELA: ICD-10-CM

## 2024-01-26 RX ORDER — INSULIN LISPRO 100 U/ML
INJECTION, SOLUTION SUBCUTANEOUS
Qty: 15 ML | Refills: 0 | Status: SHIPPED | OUTPATIENT
Start: 2024-01-26

## 2024-01-31 DIAGNOSIS — F41.9 ANXIETY: ICD-10-CM

## 2024-01-31 RX ORDER — CITALOPRAM 40 MG/1
TABLET ORAL
Qty: 90 TABLET | Refills: 1 | Status: SHIPPED | OUTPATIENT
Start: 2024-01-31

## 2024-02-06 ENCOUNTER — HOSPITAL ENCOUNTER (OUTPATIENT)
Dept: INFUSION CENTER | Facility: CLINIC | Age: 69
Discharge: HOME | End: 2024-02-06
Payer: MEDICARE

## 2024-02-06 VITALS
HEART RATE: 74 BPM | SYSTOLIC BLOOD PRESSURE: 152 MMHG | DIASTOLIC BLOOD PRESSURE: 87 MMHG | WEIGHT: 233.69 LBS | BODY MASS INDEX: 30.84 KG/M2 | TEMPERATURE: 97.3 F | RESPIRATION RATE: 18 BRPM

## 2024-02-06 DIAGNOSIS — D80.1 HYPOGAMMAGLOBULINEMIA, ACQUIRED (HCC): Primary | ICD-10-CM

## 2024-02-06 DIAGNOSIS — C91.10 CLL (CHRONIC LYMPHOCYTIC LEUKEMIA) (HCC): ICD-10-CM

## 2024-02-06 PROCEDURE — 96365 THER/PROPH/DIAG IV INF INIT: CPT

## 2024-02-06 PROCEDURE — 96366 THER/PROPH/DIAG IV INF ADDON: CPT

## 2024-02-06 PROCEDURE — 96367 TX/PROPH/DG ADDL SEQ IV INF: CPT

## 2024-02-06 RX ORDER — SODIUM CHLORIDE 9 MG/ML
20 INJECTION, SOLUTION INTRAVENOUS ONCE
Status: COMPLETED | OUTPATIENT
Start: 2024-02-06 | End: 2024-02-06

## 2024-02-06 RX ORDER — ACETAMINOPHEN 325 MG/1
650 TABLET ORAL ONCE
OUTPATIENT
Start: 2024-03-05

## 2024-02-06 RX ORDER — ACETAMINOPHEN 325 MG/1
650 TABLET ORAL ONCE
Status: COMPLETED | OUTPATIENT
Start: 2024-02-06 | End: 2024-02-06

## 2024-02-06 RX ORDER — SODIUM CHLORIDE 9 MG/ML
20 INJECTION, SOLUTION INTRAVENOUS ONCE
OUTPATIENT
Start: 2024-03-05

## 2024-02-06 RX ADMIN — ACETAMINOPHEN 650 MG: 325 TABLET, FILM COATED ORAL at 08:24

## 2024-02-06 RX ADMIN — DEXAMETHASONE SODIUM PHOSPHATE 4 MG: 10 INJECTION, SOLUTION INTRAMUSCULAR; INTRAVENOUS at 08:49

## 2024-02-06 RX ADMIN — SODIUM CHLORIDE 20 ML/HR: 0.9 INJECTION, SOLUTION INTRAVENOUS at 08:22

## 2024-02-06 RX ADMIN — DIPHENHYDRAMINE HYDROCHLORIDE 12.5 MG: 50 INJECTION, SOLUTION INTRAMUSCULAR; INTRAVENOUS at 08:26

## 2024-02-06 RX ADMIN — Medication 20 G: at 09:45

## 2024-02-06 NOTE — PLAN OF CARE
Problem: Potential for Falls  Goal: Patient will remain free of falls  Description: INTERVENTIONS:  - Educate patient/family on patient safety including physical limitations  - Instruct patient to call for assistance with activity   - Consult OT/PT to assist with strengthening/mobility   - Keep Call bell within reach  - Keep bed low and locked with side rails adjusted as appropriate  - Keep care items and personal belongings within reach  - Initiate and maintain comfort rounds  - Make Fall Risk Sign visible to staff  - Offer Toileting every  Hours, in advance of need  - Initiate/Maintain alarm  - Obtain necessary fall risk management equipment:   - Apply yellow socks and bracelet for high fall risk patients  - Consider moving patient to room near nurses station  2/6/2024 0829 by Kaylan Thomas, RN  Outcome: Progressing  2/6/2024 0829 by Kaylan Thomas, RN  Outcome: Progressing

## 2024-02-06 NOTE — PROGRESS NOTES
Patient tolerated treatment without incident. Patient declined AVS but is aware of appointment on 2/20/2024 at 8AM.

## 2024-02-15 DIAGNOSIS — C91.10 CLL (CHRONIC LYMPHOCYTIC LEUKEMIA) (HCC): Primary | ICD-10-CM

## 2024-02-15 RX ORDER — ACETAMINOPHEN 325 MG/1
650 TABLET ORAL ONCE
Status: CANCELLED | OUTPATIENT
Start: 2024-02-20

## 2024-02-15 RX ORDER — SODIUM CHLORIDE 9 MG/ML
20 INJECTION, SOLUTION INTRAVENOUS ONCE
Status: CANCELLED | OUTPATIENT
Start: 2024-02-20

## 2024-02-16 RX ORDER — ACETAMINOPHEN 325 MG/1
650 TABLET ORAL ONCE
Status: CANCELLED | OUTPATIENT
Start: 2024-02-20

## 2024-02-16 RX ORDER — SODIUM CHLORIDE 9 MG/ML
20 INJECTION, SOLUTION INTRAVENOUS ONCE
Status: CANCELLED | OUTPATIENT
Start: 2024-02-20

## 2024-02-20 ENCOUNTER — HOSPITAL ENCOUNTER (OUTPATIENT)
Dept: INFUSION CENTER | Facility: CLINIC | Age: 69
Discharge: HOME | End: 2024-02-20
Payer: MEDICARE

## 2024-02-20 VITALS
RESPIRATION RATE: 18 BRPM | SYSTOLIC BLOOD PRESSURE: 136 MMHG | WEIGHT: 232 LBS | TEMPERATURE: 97.7 F | DIASTOLIC BLOOD PRESSURE: 72 MMHG | HEART RATE: 73 BPM | BODY MASS INDEX: 30.75 KG/M2 | HEIGHT: 73 IN

## 2024-02-20 DIAGNOSIS — Z79.4 TYPE 2 DIABETES MELLITUS WITH DIABETIC POLYNEUROPATHY, WITH LONG-TERM CURRENT USE OF INSULIN (HCC): ICD-10-CM

## 2024-02-20 DIAGNOSIS — E09.9 STEROID-INDUCED DIABETES MELLITUS, SEQUELA: ICD-10-CM

## 2024-02-20 DIAGNOSIS — D59.10 AUTOIMMUNE HEMOLYTIC ANEMIA (HCC): ICD-10-CM

## 2024-02-20 DIAGNOSIS — D80.1 HYPOGAMMAGLOBULINEMIA, ACQUIRED (HCC): ICD-10-CM

## 2024-02-20 DIAGNOSIS — G89.3 CANCER RELATED PAIN: ICD-10-CM

## 2024-02-20 DIAGNOSIS — N18.30 STAGE 3 CHRONIC KIDNEY DISEASE, UNSPECIFIED WHETHER STAGE 3A OR 3B CKD (HCC): ICD-10-CM

## 2024-02-20 DIAGNOSIS — C91.10 CLL (CHRONIC LYMPHOCYTIC LEUKEMIA) (HCC): Primary | ICD-10-CM

## 2024-02-20 DIAGNOSIS — E11.42 TYPE 2 DIABETES MELLITUS WITH DIABETIC POLYNEUROPATHY, WITH LONG-TERM CURRENT USE OF INSULIN (HCC): ICD-10-CM

## 2024-02-20 DIAGNOSIS — T38.0X5S STEROID-INDUCED DIABETES MELLITUS, SEQUELA: ICD-10-CM

## 2024-02-20 LAB
ALBUMIN SERPL BCP-MCNC: 4.2 G/DL (ref 3.5–5)
ALP SERPL-CCNC: 45 U/L (ref 34–104)
ALT SERPL W P-5'-P-CCNC: 39 U/L (ref 7–52)
ANION GAP SERPL CALCULATED.3IONS-SCNC: 5 MMOL/L
AST SERPL W P-5'-P-CCNC: 31 U/L (ref 13–39)
BASOPHILS # BLD MANUAL: 0.08 THOUSAND/UL (ref 0–0.1)
BASOPHILS NFR MAR MANUAL: 1 % (ref 0–1)
BILIRUB SERPL-MCNC: 0.38 MG/DL (ref 0.2–1)
BUN SERPL-MCNC: 17 MG/DL (ref 5–25)
CALCIUM SERPL-MCNC: 9.1 MG/DL (ref 8.4–10.2)
CHLORIDE SERPL-SCNC: 110 MMOL/L (ref 96–108)
CO2 SERPL-SCNC: 27 MMOL/L (ref 21–32)
CREAT SERPL-MCNC: 1.14 MG/DL (ref 0.6–1.3)
EOSINOPHIL # BLD MANUAL: 0.64 THOUSAND/UL (ref 0–0.4)
EOSINOPHIL NFR BLD MANUAL: 8 % (ref 0–6)
ERYTHROCYTE [DISTWIDTH] IN BLOOD BY AUTOMATED COUNT: 13.7 % (ref 11.6–15.1)
GFR SERPL CREATININE-BSD FRML MDRD: 65 ML/MIN/1.73SQ M
GLUCOSE SERPL-MCNC: 52 MG/DL (ref 65–140)
HCT VFR BLD AUTO: 45 % (ref 36.5–49.3)
HGB BLD-MCNC: 14.7 G/DL (ref 12–17)
IGG SERPL-MCNC: 523 MG/DL (ref 635–1741)
LG PLATELETS BLD QL SMEAR: PRESENT
LYMPHOCYTES # BLD AUTO: 1.68 THOUSAND/UL (ref 0.6–4.47)
LYMPHOCYTES # BLD AUTO: 15 % (ref 14–44)
MCH RBC QN AUTO: 31.1 PG (ref 26.8–34.3)
MCHC RBC AUTO-ENTMCNC: 32.7 G/DL (ref 31.4–37.4)
MCV RBC AUTO: 95 FL (ref 82–98)
METAMYELOCYTES NFR BLD MANUAL: 1 % (ref 0–1)
MONOCYTES # BLD AUTO: 1.28 THOUSAND/UL (ref 0–1.22)
MONOCYTES NFR BLD: 16 % (ref 4–12)
NEUTROPHILS # BLD MANUAL: 4.25 THOUSAND/UL (ref 1.85–7.62)
NEUTS BAND NFR BLD MANUAL: 6 % (ref 0–8)
NEUTS SEG NFR BLD AUTO: 47 % (ref 43–75)
PLATELET # BLD AUTO: 214 THOUSANDS/UL (ref 149–390)
PLATELET BLD QL SMEAR: ADEQUATE
PMV BLD AUTO: 11.5 FL (ref 8.9–12.7)
POTASSIUM SERPL-SCNC: 3.8 MMOL/L (ref 3.5–5.3)
PROT SERPL-MCNC: 6.5 G/DL (ref 6.4–8.4)
RBC # BLD AUTO: 4.73 MILLION/UL (ref 3.88–5.62)
RBC MORPH BLD: NORMAL
RETICS # AUTO: ABNORMAL 10*3/UL (ref 14356–105094)
RETICS # CALC: 2.03 % (ref 0.37–1.87)
SODIUM SERPL-SCNC: 142 MMOL/L (ref 135–147)
VARIANT LYMPHS # BLD AUTO: 6 %
WBC # BLD AUTO: 8.02 THOUSAND/UL (ref 4.31–10.16)

## 2024-02-20 PROCEDURE — 96415 CHEMO IV INFUSION ADDL HR: CPT

## 2024-02-20 PROCEDURE — 85027 COMPLETE CBC AUTOMATED: CPT

## 2024-02-20 PROCEDURE — 96367 TX/PROPH/DG ADDL SEQ IV INF: CPT

## 2024-02-20 PROCEDURE — 80053 COMPREHEN METABOLIC PANEL: CPT

## 2024-02-20 PROCEDURE — 85007 BL SMEAR W/DIFF WBC COUNT: CPT

## 2024-02-20 PROCEDURE — 82784 ASSAY IGA/IGD/IGG/IGM EACH: CPT

## 2024-02-20 PROCEDURE — 85045 AUTOMATED RETICULOCYTE COUNT: CPT | Performed by: INTERNAL MEDICINE

## 2024-02-20 PROCEDURE — 96413 CHEMO IV INFUSION 1 HR: CPT

## 2024-02-20 RX ORDER — SODIUM CHLORIDE 9 MG/ML
20 INJECTION, SOLUTION INTRAVENOUS ONCE
Status: COMPLETED | OUTPATIENT
Start: 2024-02-20 | End: 2024-02-20

## 2024-02-20 RX ORDER — ACETAMINOPHEN 325 MG/1
650 TABLET ORAL ONCE
Status: COMPLETED | OUTPATIENT
Start: 2024-02-20 | End: 2024-02-20

## 2024-02-20 RX ADMIN — OBINUTUZUMAB 1000 MG: 1000 INJECTION, SOLUTION, CONCENTRATE INTRAVENOUS at 10:09

## 2024-02-20 RX ADMIN — ACETAMINOPHEN 650 MG: 325 TABLET, FILM COATED ORAL at 08:57

## 2024-02-20 RX ADMIN — SODIUM CHLORIDE 20 ML/HR: 0.9 INJECTION, SOLUTION INTRAVENOUS at 08:49

## 2024-02-20 RX ADMIN — DIPHENHYDRAMINE HYDROCHLORIDE 25 MG: 50 INJECTION, SOLUTION INTRAMUSCULAR; INTRAVENOUS at 09:15

## 2024-02-20 RX ADMIN — DEXAMETHASONE SODIUM PHOSPHATE 20 MG: 10 INJECTION, SOLUTION INTRAMUSCULAR; INTRAVENOUS at 08:50

## 2024-02-20 NOTE — PROGRESS NOTES
Pt arrived to unit without complaint, tolerated treatment without incident, left unit in stable condition, aware of next appt on 3/7 0800, declines AVS today.

## 2024-02-21 PROBLEM — A32.7: Status: RESOLVED | Noted: 2018-05-02 | Resolved: 2024-02-21

## 2024-02-21 RX ORDER — OXYCODONE HYDROCHLORIDE 10 MG/1
10 TABLET ORAL EVERY 6 HOURS PRN
Qty: 28 TABLET | Refills: 0 | Status: SHIPPED | OUTPATIENT
Start: 2024-02-21

## 2024-02-21 RX ORDER — INSULIN LISPRO 100 U/ML
INJECTION, SOLUTION SUBCUTANEOUS
Qty: 15 ML | Refills: 0 | Status: SHIPPED | OUTPATIENT
Start: 2024-02-21

## 2024-02-24 DIAGNOSIS — C91.10 CLL (CHRONIC LYMPHOCYTIC LEUKEMIA) (HCC): ICD-10-CM

## 2024-02-26 RX ORDER — PREDNISONE 5 MG/1
5 TABLET ORAL DAILY
Qty: 90 TABLET | Refills: 0 | Status: SHIPPED | OUTPATIENT
Start: 2024-02-26

## 2024-03-05 ENCOUNTER — HOSPITAL ENCOUNTER (OUTPATIENT)
Dept: INFUSION CENTER | Facility: CLINIC | Age: 69
Discharge: HOME | End: 2024-03-05
Payer: MEDICARE

## 2024-03-05 VITALS
SYSTOLIC BLOOD PRESSURE: 175 MMHG | DIASTOLIC BLOOD PRESSURE: 79 MMHG | TEMPERATURE: 97.4 F | RESPIRATION RATE: 18 BRPM | HEART RATE: 71 BPM | WEIGHT: 235.5 LBS | BODY MASS INDEX: 31.08 KG/M2

## 2024-03-05 DIAGNOSIS — D80.1 HYPOGAMMAGLOBULINEMIA, ACQUIRED (HCC): Primary | ICD-10-CM

## 2024-03-05 DIAGNOSIS — C91.10 CLL (CHRONIC LYMPHOCYTIC LEUKEMIA) (HCC): ICD-10-CM

## 2024-03-05 PROCEDURE — 96366 THER/PROPH/DIAG IV INF ADDON: CPT

## 2024-03-05 PROCEDURE — 96367 TX/PROPH/DG ADDL SEQ IV INF: CPT

## 2024-03-05 PROCEDURE — 96365 THER/PROPH/DIAG IV INF INIT: CPT

## 2024-03-05 RX ORDER — ACETAMINOPHEN 325 MG/1
650 TABLET ORAL ONCE
OUTPATIENT
Start: 2024-04-02

## 2024-03-05 RX ORDER — SODIUM CHLORIDE 9 MG/ML
20 INJECTION, SOLUTION INTRAVENOUS ONCE
OUTPATIENT
Start: 2024-04-02

## 2024-03-05 RX ORDER — ACETAMINOPHEN 325 MG/1
650 TABLET ORAL ONCE
Status: COMPLETED | OUTPATIENT
Start: 2024-03-05 | End: 2024-03-05

## 2024-03-05 RX ORDER — SODIUM CHLORIDE 9 MG/ML
20 INJECTION, SOLUTION INTRAVENOUS ONCE
Status: COMPLETED | OUTPATIENT
Start: 2024-03-05 | End: 2024-03-05

## 2024-03-05 RX ADMIN — SODIUM CHLORIDE 20 ML/HR: 0.9 INJECTION, SOLUTION INTRAVENOUS at 08:31

## 2024-03-05 RX ADMIN — DEXAMETHASONE SODIUM PHOSPHATE 4 MG: 100 INJECTION INTRAMUSCULAR; INTRAVENOUS at 08:31

## 2024-03-05 RX ADMIN — DIPHENHYDRAMINE HYDROCHLORIDE 12.5 MG: 50 INJECTION, SOLUTION INTRAMUSCULAR; INTRAVENOUS at 08:55

## 2024-03-05 RX ADMIN — ACETAMINOPHEN 650 MG: 325 TABLET, FILM COATED ORAL at 08:31

## 2024-03-05 RX ADMIN — Medication 20 G: at 09:22

## 2024-03-05 NOTE — PROGRESS NOTES
Patient arrived to unit without complaint. Patient tolerated IVIG without incident. AVS declined and patient aware of next appointment on 3/19/24 at 08:00. Patient left in stable condition.

## 2024-03-05 NOTE — PLAN OF CARE
Problem: Potential for Falls  Goal: Patient will remain free of falls  Description: INTERVENTIONS:  - Educate patient/family on patient safety including physical limitations  - Instruct patient to call for assistance with activity   - Consult OT/PT to assist with strengthening/mobility   - Keep Call bell within reach  - Keep bed low and locked with side rails adjusted as appropriate  - Keep care items and personal belongings within reach  - Initiate and maintain comfort rounds  - Make Fall Risk Sign visible to staff  - Apply yellow socks and bracelet for high fall risk patients  - Consider moving patient to room near nurses station  Outcome: Progressing      Attending attestation: I have read and agree with this Resident Discharge Note. This is a [id number], admitted with bilateral cholesteatomas and subsequent left facial and left posterior auricular abscess s/p drainage with ENT. Cultures from the OR grew prevotella- pt stable on cefepime, PO flagyl, and ciprodex ear drops. Pt with PICC line in place and will be discharged home on IV antibiotics. US showed superficial venous thrombosis in right basilic vein at site of previous IV. Pt will f/u with ENT and ID as well as PCP.    I was physically present for the evaluation and management services provided. I agree with the included history, physical, and plan which I reviewed and edited where appropriate. I spent 35 minutes with the patient and the patient's family on direct patient care and discharge planning with more than 50% of the visit spent on counseling and/or coordination of care.     Attending exam at 10 :30   Gen: no apparent distress, appears comfortable  HEENT: normocephalic/atraumatic, moist mucous membranes, throat clear, pupils equal round and reactive, extraocular movements intact, clear conjunctiva, left ear with bandage in place, no drainage or erythema  Neck: supple, no LAD  Heart: S1S2+, regular rate and rhythm, no murmur, cap refill < 2 sec, 2+ peripheral pulses  Lungs: normal respiratory pattern, clear to auscultation bilaterally  Abd: soft, nontender, nondistended, bowel sounds present, no hepatosplenomegaly  : deferred  Ext: full range of motion, no edema, no tenderness, no swelling or tenderness of right arm, Lt picc in place C/D/I  Neuro: no focal deficits, awake, alert, no acute change from baseline exam  Skin: no rash, intact and not indurated      Nely Gilmore MD  Pediatric Hospitalist

## 2024-03-07 ENCOUNTER — PATIENT OUTREACH (OUTPATIENT)
Dept: HEMATOLOGY ONCOLOGY | Facility: CLINIC | Age: 69
End: 2024-03-07

## 2024-03-07 ENCOUNTER — TELEPHONE (OUTPATIENT)
Dept: HEMATOLOGY ONCOLOGY | Facility: CLINIC | Age: 69
End: 2024-03-07

## 2024-03-07 NOTE — TELEPHONE ENCOUNTER
Patient Call    Who are you speaking with? Patient    If it is not the patient, are they listed on an active communication consent form? N/A   What is the reason for this call? Patient would like a call back to discuss  his medication   Does this require a call back? Yes   If a call back is required, please list best call back number [phone]   If a call back is required, advise that a message will be forwarded to their care team and someone will return their call as soon as possible.   Did you relay this information to the patient? Yes

## 2024-03-07 NOTE — TELEPHONE ENCOUNTER
Returned telephone call spoke with Pt.  He has enough Imbruvica through Sunday.  He called [facility] and was told they are no longer handling this medication.  Explained I will send a message to our finance dept for assistance and someone will call him back.

## 2024-03-11 ENCOUNTER — DOCUMENTATION (OUTPATIENT)
Dept: HEMATOLOGY ONCOLOGY | Facility: CLINIC | Age: 69
End: 2024-03-11

## 2024-03-11 NOTE — PROGRESS NOTES
"Received email from PT:  \"Good morningmiss rain. Cayetano Lucero here i was wondering where i stand on my   Imbruvica?please give me a heads up.i am takeing my last one today.i skipped  a day in between . As of today i have no more. Thank you for helping me.\"  Reached out to [facility] regarding samples. Application is still waiting for HCP signature. There are no samples of this RX and the needed dose. [facility].      Rain Martinez, MPH  Phone:[phone]  Email: [email or web address]      "

## 2024-03-12 DIAGNOSIS — C91.10 CLL (CHRONIC LYMPHOCYTIC LEUKEMIA) (HCC): ICD-10-CM

## 2024-03-12 RX ORDER — PREDNISONE 5 MG/1
5 TABLET ORAL DAILY
Qty: 90 TABLET | Refills: 0 | Status: SHIPPED | OUTPATIENT
Start: 2024-03-12

## 2024-03-13 ENCOUNTER — DOCUMENTATION (OUTPATIENT)
Dept: HEMATOLOGY ONCOLOGY | Facility: CLINIC | Age: 69
End: 2024-03-13

## 2024-03-13 ENCOUNTER — PATIENT MESSAGE (OUTPATIENT)
Dept: HEMATOLOGY ONCOLOGY | Facility: CLINIC | Age: 69
End: 2024-03-13

## 2024-03-13 DIAGNOSIS — C91.10 CHRONIC LYMPHATIC LEUKEMIA (HCC): ICD-10-CM

## 2024-03-13 RX ORDER — SODIUM CHLORIDE 9 MG/ML
20 INJECTION, SOLUTION INTRAVENOUS ONCE
Status: CANCELLED | OUTPATIENT
Start: 2024-03-19

## 2024-03-13 RX ORDER — ACETAMINOPHEN 325 MG/1
650 TABLET ORAL ONCE
Status: CANCELLED | OUTPATIENT
Start: 2024-03-19

## 2024-03-13 NOTE — PROGRESS NOTES
PT called to indicate he is out of medication and [facility] called to inform him that his application is denied due to missing pieces. This writer called [employer] to get further clarity. They are requesting the PT's taxes and aid that the HCP's signature was illegible. This writer sent the application back to the HCP team for resigning at informed the PT of the taxes component.   [facility] see what options there are regarding a fill- ASAP. Golden indicated that there is no RX insurance on file or pulling RTE. This writer informed the PT and he provided his pharmacy insurance benefits. The test claim Golden ran came back that [facility] is non-par and that the PT does not have coverage and was provided the Help Desk # for [facility], [facility] help desk # [phone] (PT and the test claim rejection indicated that this was the preferred pharmacy). This writer called that number and was informed that this writer should call [employer] instead, [employer] help desk # [phone]. Upon calling ethology desk, this writer spoke with a representative named Susan. She indicated that the patient's preferred pharmacy is [facility], not [facility] (which is likely why the call to [facility] was unsuccessful). Additionally, she mentioned that the year of birth in the rejection in their system was 1965 and this writer informed her it was 1955. This was likely an additional layer to this. I asked Susan to run a test claim of if this medication was run through [facility] pharmacy. This resulted in a price of the medication of $4,899.99 which is over the maximum cost allowable per the formulary. She questioned the quantity of the medication, at which time it was confirmed that the script was written for 28-day quantity blister pack. Susan said then the only way to override this is to submit a ticket for override approval, which will take approxiamtely1 business day. Upon  a verdict, she will call my direct line at [phone]. This will determine if the insurance covers this script, and at what percent. This effort was taken to exhaust the insurance option, as initially [employer] indicated that the patient did not have pharmacy insurance, but the patient did (non-par with [employer] but insurance that is active nonetheless). In the light of the free drug application yielding complications and the patient not having access to the medicine for many days, this avenue was pursued to exhaust all pathways.   Susan called back and indicated that the ticket had been cancelled because the [facility] and the pharmacy needs to change the year of birth to 1955, which will allow them to see the claim rejection as cost exceeded. Once this is underway, this writer can call [facility] to reinitiate the ticket with an expedited status which has an approximate turnaround of 4 business hours. The PT has been made aware.   Preliminarily secured S funding:      Pharmacy Benefit Card:      Rain Martinez MPH  Phone:[phone]  Email: [email or web address]

## 2024-03-14 DIAGNOSIS — C91.10 CHRONIC LYMPHATIC LEUKEMIA (HCC): ICD-10-CM

## 2024-03-18 DIAGNOSIS — C91.10 CHRONIC LYMPHATIC LEUKEMIA (HCC): ICD-10-CM

## 2024-03-18 NOTE — TELEPHONE ENCOUNTER
Spoke with pt and wife and provided update. They are aware the [facility] bella will be used to cover the cost, and that the script will now be sent to [id number].

## 2024-03-19 ENCOUNTER — HOSPITAL ENCOUNTER (OUTPATIENT)
Dept: INFUSION CENTER | Facility: CLINIC | Age: 69
Discharge: HOME | End: 2024-03-19
Payer: MEDICARE

## 2024-03-19 ENCOUNTER — DOCUMENTATION (OUTPATIENT)
Dept: HEMATOLOGY ONCOLOGY | Facility: CLINIC | Age: 69
End: 2024-03-19

## 2024-03-19 VITALS
SYSTOLIC BLOOD PRESSURE: 160 MMHG | RESPIRATION RATE: 18 BRPM | TEMPERATURE: 97.3 F | WEIGHT: 233.69 LBS | BODY MASS INDEX: 30.84 KG/M2 | DIASTOLIC BLOOD PRESSURE: 90 MMHG | HEART RATE: 73 BPM

## 2024-03-19 DIAGNOSIS — C91.10 CLL (CHRONIC LYMPHOCYTIC LEUKEMIA) (HCC): Primary | ICD-10-CM

## 2024-03-19 LAB
ALBUMIN SERPL BCP-MCNC: 4.2 G/DL (ref 3.5–5)
ALP SERPL-CCNC: 45 U/L (ref 34–104)
ALT SERPL W P-5'-P-CCNC: 53 U/L (ref 7–52)
ANION GAP SERPL CALCULATED.3IONS-SCNC: 5 MMOL/L (ref 4–13)
AST SERPL W P-5'-P-CCNC: 40 U/L (ref 13–39)
BASOPHILS # BLD MANUAL: 0.1 THOUSAND/UL (ref 0–0.1)
BASOPHILS NFR MAR MANUAL: 2 % (ref 0–1)
BILIRUB SERPL-MCNC: 0.55 MG/DL (ref 0.2–1)
BUN SERPL-MCNC: 17 MG/DL (ref 5–25)
CALCIUM SERPL-MCNC: 9.1 MG/DL (ref 8.4–10.2)
CHLORIDE SERPL-SCNC: 107 MMOL/L (ref 96–108)
CO2 SERPL-SCNC: 27 MMOL/L (ref 21–32)
CREAT SERPL-MCNC: 1.09 MG/DL (ref 0.6–1.3)
EOSINOPHIL # BLD MANUAL: 0.68 THOUSAND/UL (ref 0–0.4)
EOSINOPHIL NFR BLD MANUAL: 14 % (ref 0–6)
ERYTHROCYTE [DISTWIDTH] IN BLOOD BY AUTOMATED COUNT: 13.7 % (ref 11.6–15.1)
GFR SERPL CREATININE-BSD FRML MDRD: 68 ML/MIN/1.73SQ M
GLUCOSE SERPL-MCNC: 161 MG/DL (ref 65–140)
HCT VFR BLD AUTO: 44.7 % (ref 36.5–49.3)
HGB BLD-MCNC: 14.6 G/DL (ref 12–17)
IGG SERPL-MCNC: 508 MG/DL (ref 635–1741)
LYMPHOCYTES # BLD AUTO: 0.58 THOUSAND/UL (ref 0.6–4.47)
LYMPHOCYTES # BLD AUTO: 12 % (ref 14–44)
MCH RBC QN AUTO: 30.5 PG (ref 26.8–34.3)
MCHC RBC AUTO-ENTMCNC: 32.7 G/DL (ref 31.4–37.4)
MCV RBC AUTO: 93 FL (ref 82–98)
MONOCYTES # BLD AUTO: 0.68 THOUSAND/UL (ref 0–1.22)
MONOCYTES NFR BLD: 14 % (ref 4–12)
NEUTROPHILS # BLD MANUAL: 2.82 THOUSAND/UL (ref 1.85–7.62)
NEUTS SEG NFR BLD AUTO: 58 % (ref 43–75)
PLATELET # BLD AUTO: 186 THOUSANDS/UL (ref 149–390)
PLATELET BLD QL SMEAR: ADEQUATE
PMV BLD AUTO: 11 FL (ref 8.9–12.7)
POTASSIUM SERPL-SCNC: 4.1 MMOL/L (ref 3.5–5.3)
PROT SERPL-MCNC: 6.3 G/DL (ref 6.4–8.4)
RBC # BLD AUTO: 4.79 MILLION/UL (ref 3.88–5.62)
RBC MORPH BLD: NORMAL
RETICS # AUTO: ABNORMAL 10*3/UL (ref 14356–105094)
RETICS # CALC: 2.35 % (ref 0.37–1.87)
SODIUM SERPL-SCNC: 139 MMOL/L (ref 135–147)
WBC # BLD AUTO: 4.86 THOUSAND/UL (ref 4.31–10.16)

## 2024-03-19 PROCEDURE — 85007 BL SMEAR W/DIFF WBC COUNT: CPT | Performed by: INTERNAL MEDICINE

## 2024-03-19 PROCEDURE — 82784 ASSAY IGA/IGD/IGG/IGM EACH: CPT | Performed by: INTERNAL MEDICINE

## 2024-03-19 PROCEDURE — 96367 TX/PROPH/DG ADDL SEQ IV INF: CPT

## 2024-03-19 PROCEDURE — 85045 AUTOMATED RETICULOCYTE COUNT: CPT | Performed by: INTERNAL MEDICINE

## 2024-03-19 PROCEDURE — 96415 CHEMO IV INFUSION ADDL HR: CPT

## 2024-03-19 PROCEDURE — 85027 COMPLETE CBC AUTOMATED: CPT | Performed by: INTERNAL MEDICINE

## 2024-03-19 PROCEDURE — 80053 COMPREHEN METABOLIC PANEL: CPT | Performed by: INTERNAL MEDICINE

## 2024-03-19 PROCEDURE — 96413 CHEMO IV INFUSION 1 HR: CPT

## 2024-03-19 RX ORDER — ACETAMINOPHEN 325 MG/1
650 TABLET ORAL ONCE
Status: COMPLETED | OUTPATIENT
Start: 2024-03-19 | End: 2024-03-19

## 2024-03-19 RX ORDER — SODIUM CHLORIDE 9 MG/ML
20 INJECTION, SOLUTION INTRAVENOUS ONCE
Status: COMPLETED | OUTPATIENT
Start: 2024-03-19 | End: 2024-03-19

## 2024-03-19 RX ADMIN — OBINUTUZUMAB 1000 MG: 1000 INJECTION, SOLUTION, CONCENTRATE INTRAVENOUS at 09:18

## 2024-03-19 RX ADMIN — DIPHENHYDRAMINE HYDROCHLORIDE 25 MG: 50 INJECTION, SOLUTION INTRAMUSCULAR; INTRAVENOUS at 08:43

## 2024-03-19 RX ADMIN — DEXAMETHASONE SODIUM PHOSPHATE 20 MG: 100 INJECTION INTRAMUSCULAR; INTRAVENOUS at 08:21

## 2024-03-19 RX ADMIN — SODIUM CHLORIDE 20 ML/HR: 0.9 INJECTION, SOLUTION INTRAVENOUS at 08:20

## 2024-03-19 RX ADMIN — ACETAMINOPHEN 650 MG: 325 TABLET, FILM COATED ORAL at 08:21

## 2024-03-19 NOTE — PROGRESS NOTES
Labs collected via port per order. Pt tolerated treatment without incident. Pt declined AVS, aware of next appt 4/2 at 8am for IVIG.

## 2024-03-19 NOTE — PROGRESS NOTES
Spoke to [facility]. The biggest hold up with this application is that they are claiming that the entire application is blurry. This writer has printed and redone this application now and re-faxed it effectively 7 times. They did an electronic income verification, so the taxes included are a formality.     They requested that [facility] be E-scribed the RX. They also requested a fax of the prescription because the application usually acts as that but since it's “blurry” there's no way for them to re-fill the medication next month and the representative was “unsure” if [facility] would send them a copy.    [facility]:   [phone]  E-Scribe RX     [facility]:   [phone]        Rain Martinez MPH  Phone:[phone]  Email: [email or web address]

## 2024-03-20 ENCOUNTER — TELEPHONE (OUTPATIENT)
Dept: HEMATOLOGY ONCOLOGY | Facility: CLINIC | Age: 69
End: 2024-03-20

## 2024-03-20 DIAGNOSIS — C91.10 CHRONIC LYMPHATIC LEUKEMIA (HCC): ICD-10-CM

## 2024-03-20 NOTE — TELEPHONE ENCOUNTER
Labs reviewed.  Reviewed there is no concern of active CLL at this time.  Additionally hemoglobin is stable.  Reviewed reticulocyte count is slightly increased.  He has been off medication, Imbruvica, for 2 weeks.  Hopefully he will have medication by end of this week.  If he does not have medication by next week he is to let us know and we will repeat CBC and retake count again.  Reassurance provided.  Reviewed this is a unfortunate process with insurance company for medication authorization.  He understands to continue working closely with oncology finance team.      ----- Message from Isaura Casey RN sent at 3/20/2024  8:00 AM EDT -----  Regarding: FW: Imbruvdeann  Contact: [phone]    ----- Message -----  From: Gemini Montana MA  Sent: 3/19/2024   5:12 PM EDT  To: Carlee Walker RN; Isaura Casey RN  Subject: FW: Imbruvica                                      ----- Message -----  From: Cayetano Lucero  Sent: 3/19/2024   3:57 PM EDT  To: H[facility]ology [facility] [place] [facility]  Subject: Imbruvica                                        My blood work. Iwas wondering where stand being 9 days with out my lmbruvica?

## 2024-03-25 ENCOUNTER — PATIENT OUTREACH (OUTPATIENT)
Dept: HEMATOLOGY ONCOLOGY | Facility: CLINIC | Age: 69
End: 2024-03-25

## 2024-03-25 DIAGNOSIS — C91.10 CHRONIC LYMPHATIC LEUKEMIA (HCC): Primary | ICD-10-CM

## 2024-03-25 NOTE — PROGRESS NOTES
"This writer and this PT have been working together on pursuing all avenues to obtain this RX fill.     This writer called [facility] at [phone]. The representative that this writer spoke with was Darleen. She indicated that they are awaiting a bella denial letter before the PT can be considered for free drug. This writer explained the JPAP d/c this PT's services and the LLS is approved but CANNOT be utilized due to insurance issues ($10,000 bella will not cover the $17,000/ fill quoted per the insurance with no de-facto OOP max?) Additionally, [facility] is the \"preferred pharmacy\" for the insurance RX but the continuously route this case to [facility], who will NOT fill this RX, as they do not carry this medication. This then gets routed to [id number] which cannot fill due to the insurance restrictions, etc. This writer explained the lack of possibilities the PT has remaining....    At this point in time, the application has been re-faxed 13 times to Lenny at [phone] and E-Scribed to [employer]: [phone].     Free drug has been determined as the primary avenue to pursue, for best possible results. Insurance, LLS co-payment, and [facility] options have been placed on a temporary hold until more information has been obtained from Lenny.    PT has been extremely patient and understanding throughout this process.       Rain Martinez MPH  Phone:[phone]  Email: [email or web address]    "

## 2024-03-26 ENCOUNTER — DOCUMENTATION (OUTPATIENT)
Dept: HEMATOLOGY ONCOLOGY | Facility: CLINIC | Age: 69
End: 2024-03-26

## 2024-03-26 ENCOUNTER — HOSPITAL ENCOUNTER (OUTPATIENT)
Dept: INFUSION CENTER | Facility: CLINIC | Age: 69
Discharge: HOME | End: 2024-03-26
Payer: MEDICARE

## 2024-03-26 DIAGNOSIS — C91.10 CLL (CHRONIC LYMPHOCYTIC LEUKEMIA) (HCC): ICD-10-CM

## 2024-03-26 DIAGNOSIS — Z95.828 PORT-A-CATH IN PLACE: Primary | ICD-10-CM

## 2024-03-26 LAB
BASOPHILS # BLD MANUAL: 0 THOUSAND/UL (ref 0–0.1)
BASOPHILS NFR MAR MANUAL: 0 % (ref 0–1)
EOSINOPHIL # BLD MANUAL: 0.37 THOUSAND/UL (ref 0–0.4)
EOSINOPHIL NFR BLD MANUAL: 6 % (ref 0–6)
ERYTHROCYTE [DISTWIDTH] IN BLOOD BY AUTOMATED COUNT: 13.7 % (ref 11.6–15.1)
HCT VFR BLD AUTO: 44.2 % (ref 36.5–49.3)
HGB BLD-MCNC: 14.4 G/DL (ref 12–17)
LYMPHOCYTES # BLD AUTO: 1.1 THOUSAND/UL (ref 0.6–4.47)
LYMPHOCYTES # BLD AUTO: 18 % (ref 14–44)
MCH RBC QN AUTO: 30.8 PG (ref 26.8–34.3)
MCHC RBC AUTO-ENTMCNC: 32.6 G/DL (ref 31.4–37.4)
MCV RBC AUTO: 94 FL (ref 82–98)
MONOCYTES # BLD AUTO: 0.49 THOUSAND/UL (ref 0–1.22)
MONOCYTES NFR BLD: 8 % (ref 4–12)
NEUTROPHILS # BLD MANUAL: 4.15 THOUSAND/UL (ref 1.85–7.62)
NEUTS SEG NFR BLD AUTO: 68 % (ref 43–75)
PLATELET # BLD AUTO: 117 THOUSANDS/UL (ref 149–390)
PLATELET BLD QL SMEAR: ABNORMAL
PMV BLD AUTO: 11 FL (ref 8.9–12.7)
RBC # BLD AUTO: 4.68 MILLION/UL (ref 3.88–5.62)
RBC MORPH BLD: NORMAL
WBC # BLD AUTO: 6.11 THOUSAND/UL (ref 4.31–10.16)

## 2024-03-26 PROCEDURE — 85027 COMPLETE CBC AUTOMATED: CPT | Performed by: INTERNAL MEDICINE

## 2024-03-26 PROCEDURE — 85007 BL SMEAR W/DIFF WBC COUNT: CPT | Performed by: INTERNAL MEDICINE

## 2024-03-26 NOTE — PLAN OF CARE
Problem: INFECTION - ADULT  Goal: Absence or prevention of progression during hospitalization  Description: INTERVENTIONS:  - Assess and monitor for signs and symptoms of infection  - Monitor lab/diagnostic results  - Monitor all insertion sites, i.e. indwelling lines, tubes, and drains  - Monitor endotracheal if appropriate and nasal secretions for changes in amount and color  - [place] appropriate cooling/warming therapies per order  - Administer medications as ordered  - Instruct and encourage patient and family to use good hand hygiene technique  - Identify and instruct in appropriate isolation precautions for identified infection/condition  3/26/2024 1035 by Lian Dominguez RN  Outcome: Progressing  3/26/2024 1035 by Lian Dominguez RN  Outcome: Progressing  Goal: Absence of fever/infection during neutropenic period  Description: INTERVENTIONS:  - Monitor WBC    3/26/2024 1035 by Lian Dominguez RN  Outcome: Progressing  3/26/2024 1035 by Lian Dominguez RN  Outcome: Progressing     Problem: Knowledge Deficit  Goal: Patient/family/caregiver demonstrates understanding of disease process, treatment plan, medications, and discharge instructions  Description: Complete learning assessment and assess knowledge base.  Interventions:  - Provide teaching at level of understanding  - Provide teaching via preferred learning methods  3/26/2024 1035 by Lian Dominguez RN  Outcome: Progressing  3/26/2024 1035 by Lian Dominguez RN  Outcome: Progressing

## 2024-03-26 NOTE — PROGRESS NOTES
CBC obtained as ordered. Pt. States he is having increased CARBALLO at this time.  Pt. Is scheduled for infusion on 4/2/24 at 0800.  AVS declined.

## 2024-03-26 NOTE — PROGRESS NOTES
Called PT to inform him that Lenny required this writer to fax documentation that [facility] funding is closed to [phone]. They requested that documentation of un enrollment from [facility] be faxed but upon calling S, the representative, Gloria, said that could not be accommodated. The best they could do was fax a letter indicating 0 claims processed and then in 90 days of inactivity, send the letter of [id number] to the PT. The PT cannot wait 90 days for this RX. Gloria escalated the request to a supervisor and promised to call back upon clarity of the action items.         Rain Martinez, MPH  Phone:[phone]  Email: [email or web address]

## 2024-03-26 NOTE — PLAN OF CARE
Problem: INFECTION - ADULT  Goal: Absence or prevention of progression during hospitalization  Description: INTERVENTIONS:  - Assess and monitor for signs and symptoms of infection  - Monitor lab/diagnostic results  - Monitor all insertion sites, i.e. indwelling lines, tubes, and drains  - Monitor endotracheal if appropriate and nasal secretions for changes in amount and color  - [place] appropriate cooling/warming therapies per order  - Administer medications as ordered  - Instruct and encourage patient and family to use good hand hygiene technique  - Identify and instruct in appropriate isolation precautions for identified infection/condition  Outcome: Progressing  Goal: Absence of fever/infection during neutropenic period  Description: INTERVENTIONS:  - Monitor WBC    Outcome: Progressing     Problem: Knowledge Deficit  Goal: Patient/family/caregiver demonstrates understanding of disease process, treatment plan, medications, and discharge instructions  Description: Complete learning assessment and assess knowledge base.  Interventions:  - Provide teaching at level of understanding  - Provide teaching via preferred learning methods  Outcome: Progressing

## 2024-03-26 NOTE — PROGRESS NOTES
Spoke to Mayelin at [facility]. She confirmed receipt of the earlier fax indicating that funding was closed for this PT's DX type. Mayelin noted that 72 HR turn around for processing is WITH an expedited PT status. Called [phone].        Rain Martinez, MPH  Phone:[phone]  Email: [email or web address]

## 2024-03-27 ENCOUNTER — TELEPHONE (OUTPATIENT)
Dept: SURGICAL ONCOLOGY | Facility: CLINIC | Age: 69
End: 2024-03-27

## 2024-03-27 DIAGNOSIS — C91.10 CLL (CHRONIC LYMPHOCYTIC LEUKEMIA) (HCC): Primary | ICD-10-CM

## 2024-03-27 NOTE — TELEPHONE ENCOUNTER
Left message for patient. Left nurse TEAMS number as call back number.     ----- Message from Carlee Walker RN sent at 3/27/2024 12:27 PM EDT -----  Regarding: lab results all wnl except plts 117,000  Contact: [phone]  Please review.  ----- Message -----  From: Trinity Davis RN  Sent: 3/27/2024  10:05 AM EDT  To: Carlee Walker RN  Subject: FW: Me .lol                                        ----- Message -----  From: Cayetano Lucero  Sent: 3/27/2024   9:59 AM EDT  To: H[facility]ology [facility] [place] [facility]  Subject: Me .lol                                          Ok what does my blood test say.

## 2024-03-27 NOTE — TELEPHONE ENCOUNTER
Spoke with patient.  Reviewed situation of not having Imbruvica medication.  He is on week 4 not having medication.  Reviewed labs.  He has mild thrombocytopenia but not overly concerning.  Will repeat labs in 1 week.  He should report any new symptoms in the meantime.    Lymphocyte count is normal; no evidence of hemolytic anemia.    We will continue CBC and retake monitoring as well as will check a flow cytometry to see if there is any detected abnormal CLL cells still circulating.  If this is concerning or unclear, we could consider CLL MRD testing through [facility].    Reviewed that we would not favor transition to venetoclax secondary to him not responding to this and his most recent relapse in approximately 2018/19.    If another alternative is needed, would favor acalabrutinib or zanubrutinib.

## 2024-03-29 ENCOUNTER — DOCUMENTATION (OUTPATIENT)
Dept: HEMATOLOGY ONCOLOGY | Facility: CLINIC | Age: 69
End: 2024-03-29

## 2024-03-29 NOTE — PROGRESS NOTES
After a long journey, Lenny approved him this afternoon. His medication should arrive no later than 3/8/24. This writer called the PT to discuss shipment details and timeline renewals for next year. He is aware and grateful for everything.        Rain Martinez MPH  Phone:[phone]  Email: [email or web address]

## 2024-04-02 ENCOUNTER — HOSPITAL ENCOUNTER (OUTPATIENT)
Dept: INFUSION CENTER | Facility: CLINIC | Age: 69
Discharge: HOME | End: 2024-04-02
Payer: MEDICARE

## 2024-04-02 VITALS
SYSTOLIC BLOOD PRESSURE: 143 MMHG | BODY MASS INDEX: 30.26 KG/M2 | DIASTOLIC BLOOD PRESSURE: 82 MMHG | TEMPERATURE: 97.5 F | RESPIRATION RATE: 18 BRPM | HEART RATE: 82 BPM | OXYGEN SATURATION: 94 % | WEIGHT: 229.28 LBS

## 2024-04-02 DIAGNOSIS — C91.10 CLL (CHRONIC LYMPHOCYTIC LEUKEMIA) (HCC): ICD-10-CM

## 2024-04-02 DIAGNOSIS — D80.1 HYPOGAMMAGLOBULINEMIA, ACQUIRED (HCC): Primary | ICD-10-CM

## 2024-04-02 LAB
BASOPHILS # BLD MANUAL: 0 THOUSAND/UL (ref 0–0.1)
BASOPHILS NFR MAR MANUAL: 0 % (ref 0–1)
EOSINOPHIL # BLD MANUAL: 0.59 THOUSAND/UL (ref 0–0.4)
EOSINOPHIL NFR BLD MANUAL: 9 % (ref 0–6)
ERYTHROCYTE [DISTWIDTH] IN BLOOD BY AUTOMATED COUNT: 13.7 % (ref 11.6–15.1)
HCT VFR BLD AUTO: 44.5 % (ref 36.5–49.3)
HGB BLD-MCNC: 14.6 G/DL (ref 12–17)
LYMPHOCYTES # BLD AUTO: 0.99 THOUSAND/UL (ref 0.6–4.47)
LYMPHOCYTES # BLD AUTO: 14 % (ref 14–44)
MCH RBC QN AUTO: 31.1 PG (ref 26.8–34.3)
MCHC RBC AUTO-ENTMCNC: 32.8 G/DL (ref 31.4–37.4)
MCV RBC AUTO: 95 FL (ref 82–98)
MONOCYTES # BLD AUTO: 0.79 THOUSAND/UL (ref 0–1.22)
MONOCYTES NFR BLD: 12 % (ref 4–12)
NEUTROPHILS # BLD MANUAL: 4.23 THOUSAND/UL (ref 1.85–7.62)
NEUTS BAND NFR BLD MANUAL: 5 % (ref 0–8)
NEUTS SEG NFR BLD AUTO: 59 % (ref 43–75)
PLATELET # BLD AUTO: 162 THOUSANDS/UL (ref 149–390)
PLATELET BLD QL SMEAR: ADEQUATE
PMV BLD AUTO: 10.5 FL (ref 8.9–12.7)
RBC # BLD AUTO: 4.69 MILLION/UL (ref 3.88–5.62)
RBC MORPH BLD: NORMAL
RETICS # AUTO: NORMAL 10*3/UL (ref 14356–105094)
RETICS # CALC: 1.76 % (ref 0.37–1.87)
VARIANT LYMPHS # BLD AUTO: 1 %
WBC # BLD AUTO: 6.61 THOUSAND/UL (ref 4.31–10.16)

## 2024-04-02 PROCEDURE — 85007 BL SMEAR W/DIFF WBC COUNT: CPT

## 2024-04-02 PROCEDURE — 85045 AUTOMATED RETICULOCYTE COUNT: CPT

## 2024-04-02 PROCEDURE — 96367 TX/PROPH/DG ADDL SEQ IV INF: CPT

## 2024-04-02 PROCEDURE — 96365 THER/PROPH/DIAG IV INF INIT: CPT

## 2024-04-02 PROCEDURE — 96366 THER/PROPH/DIAG IV INF ADDON: CPT

## 2024-04-02 PROCEDURE — 88184 FLOWCYTOMETRY/ TC 1 MARKER: CPT

## 2024-04-02 PROCEDURE — 85027 COMPLETE CBC AUTOMATED: CPT

## 2024-04-02 PROCEDURE — 88185 FLOWCYTOMETRY/TC ADD-ON: CPT

## 2024-04-02 RX ORDER — ACETAMINOPHEN 325 MG/1
650 TABLET ORAL ONCE
OUTPATIENT
Start: 2024-04-30

## 2024-04-02 RX ORDER — SODIUM CHLORIDE 9 MG/ML
20 INJECTION, SOLUTION INTRAVENOUS ONCE
Status: COMPLETED | OUTPATIENT
Start: 2024-04-02 | End: 2024-04-02

## 2024-04-02 RX ORDER — ACETAMINOPHEN 325 MG/1
650 TABLET ORAL ONCE
Status: COMPLETED | OUTPATIENT
Start: 2024-04-02 | End: 2024-04-02

## 2024-04-02 RX ORDER — SODIUM CHLORIDE 9 MG/ML
20 INJECTION, SOLUTION INTRAVENOUS ONCE
OUTPATIENT
Start: 2024-04-30

## 2024-04-02 RX ADMIN — DIPHENHYDRAMINE HYDROCHLORIDE 12.5 MG: 50 INJECTION, SOLUTION INTRAMUSCULAR; INTRAVENOUS at 08:50

## 2024-04-02 RX ADMIN — Medication 20 G: at 09:14

## 2024-04-02 RX ADMIN — DEXAMETHASONE SODIUM PHOSPHATE 4 MG: 100 INJECTION INTRAMUSCULAR; INTRAVENOUS at 08:30

## 2024-04-02 RX ADMIN — SODIUM CHLORIDE 20 ML/HR: 0.9 INJECTION, SOLUTION INTRAVENOUS at 08:28

## 2024-04-02 RX ADMIN — ACETAMINOPHEN 650 MG: 325 TABLET, FILM COATED ORAL at 08:31

## 2024-04-02 NOTE — PLAN OF CARE
Problem: Knowledge Deficit  Goal: Patient/family/caregiver demonstrates understanding of disease process, treatment plan, medications, and discharge instructions  Description: Complete learning assessment and assess knowledge base.  Interventions:  - Provide teaching at level of understanding  - Provide teaching via preferred learning methods  4/2/2024 1047 by Sherri aHji RN  Outcome: Progressing  4/2/2024 0807 by Sherri Haji RN  Outcome: Progressing

## 2024-04-02 NOTE — PROGRESS NOTES
Pt arrived to unit with some CARBALLO which dissipates with rest. Pulse ox 94% at rest. Pt states he does monitor his pulse ox at home. Pt has hx COPD. Labs ordered by Raymond FIELD on 3/27/24 collected via port per order. Pt tolerated IVIG without incident. Pt declined AVS, aware of next appt 4/16 at 8am for Gazyva

## 2024-04-04 ENCOUNTER — TELEPHONE (OUTPATIENT)
Dept: FAMILY MEDICINE CLINIC | Facility: CLINIC | Age: 69
End: 2024-04-04

## 2024-04-04 ENCOUNTER — OFFICE VISIT (OUTPATIENT)
Dept: FAMILY MEDICINE CLINIC | Facility: CLINIC | Age: 69
End: 2024-04-04
Payer: MEDICARE

## 2024-04-04 VITALS
SYSTOLIC BLOOD PRESSURE: 136 MMHG | DIASTOLIC BLOOD PRESSURE: 72 MMHG | OXYGEN SATURATION: 96 % | WEIGHT: 231.6 LBS | BODY MASS INDEX: 30.69 KG/M2 | HEART RATE: 80 BPM | TEMPERATURE: 97.5 F | HEIGHT: 73 IN

## 2024-04-04 DIAGNOSIS — F41.9 ANXIETY DISORDER, UNSPECIFIED TYPE: ICD-10-CM

## 2024-04-04 DIAGNOSIS — K21.00 GASTROESOPHAGEAL REFLUX DISEASE WITH ESOPHAGITIS, UNSPECIFIED WHETHER HEMORRHAGE: ICD-10-CM

## 2024-04-04 DIAGNOSIS — E11.42 TYPE 2 DIABETES MELLITUS WITH DIABETIC POLYNEUROPATHY, WITH LONG-TERM CURRENT USE OF INSULIN (HCC): ICD-10-CM

## 2024-04-04 DIAGNOSIS — J44.9 CHRONIC OBSTRUCTIVE PULMONARY DISEASE, UNSPECIFIED COPD TYPE (HCC): ICD-10-CM

## 2024-04-04 DIAGNOSIS — C91.10 CLL (CHRONIC LYMPHOCYTIC LEUKEMIA) (HCC): ICD-10-CM

## 2024-04-04 DIAGNOSIS — E78.2 MIXED HYPERLIPIDEMIA: ICD-10-CM

## 2024-04-04 DIAGNOSIS — Z00.00 MEDICARE ANNUAL WELLNESS VISIT, SUBSEQUENT: Primary | ICD-10-CM

## 2024-04-04 DIAGNOSIS — I10 PRIMARY HYPERTENSION: ICD-10-CM

## 2024-04-04 DIAGNOSIS — Z79.4 TYPE 2 DIABETES MELLITUS WITH DIABETIC POLYNEUROPATHY, WITH LONG-TERM CURRENT USE OF INSULIN (HCC): ICD-10-CM

## 2024-04-04 LAB — SCAN RESULT: NORMAL

## 2024-04-04 PROCEDURE — G0439 PPPS, SUBSEQ VISIT: HCPCS | Performed by: FAMILY MEDICINE

## 2024-04-04 PROCEDURE — 99214 OFFICE O/P EST MOD 30 MIN: CPT | Performed by: FAMILY MEDICINE

## 2024-04-04 NOTE — PATIENT INSTRUCTIONS
Medicare Preventive Visit Patient Instructions  Thank you for completing your Welcome to Medicare Visit or Medicare Annual Wellness Visit today. Your next wellness visit will be due in one year (4/5/2025).  The screening/preventive services that you may require over the next 5-10 years are detailed below. Some tests may not apply to you based off risk factors and/or age. Screening tests ordered at today's visit but not completed yet may show as past due. Also, please note that scanned in results may not display below.  Preventive Screenings:  Service Recommendations Previous Testing/Comments   Colorectal Cancer Screening  Colonoscopy    Fecal Occult Blood Test (FOBT)/Fecal Immunochemical Test (FIT)  Fecal DNA/Cologuard Test  Flexible Sigmoidoscopy Age: 45-75 years old   Colonoscopy: every 10 years (May be performed more frequently if at higher risk)  OR  FOBT/FIT: every 1 year  OR  Cologuard: every 3 years  OR  Sigmoidoscopy: every 5 years  Screening may be recommended earlier than age 45 if at higher risk for colorectal cancer. Also, an individualized decision between you and your healthcare provider will decide whether screening between the ages of 76-85 would be appropriate. Colonoscopy: 01/14/2016  FOBT/FIT: Not on file  Cologuard: Not on file  Sigmoidoscopy: Not on file    Screening Current     Prostate Cancer Screening Individualized decision between patient and health care provider in men between ages of 55-69   Medicare will cover every 12 months beginning on the day after your 50th birthday PSA: No results in last 5 years           Hepatitis C Screening Once for adults born between 1945 and 1965  More frequently in patients at high risk for Hepatitis C Hep C Antibody: 03/21/2017    Screening Current   Diabetes Screening 1-2 times per year if you're at risk for diabetes or have pre-diabetes Fasting glucose: 87 mg/dL (3/21/2023)  A1C: 6.5 (1/4/2024)  Screening Not Indicated  History Diabetes   Cholesterol  Screening Once every 5 years if you don't have a lipid disorder. May order more often based on risk factors. Lipid panel: 01/10/2023  Screening Not Indicated  History Lipid Disorder      Other Preventive Screenings Covered by Medicare:  Abdominal Aortic Aneurysm (AAA) Screening: covered once if your at risk. You're considered to be at risk if you have a family history of AAA or a male between the age of 65-75 who smoking at least 100 cigarettes in your lifetime.  Lung Cancer Screening: covers low dose CT scan once per year if you meet all of the following conditions: (1) Age 55-77; (2) No signs or symptoms of lung cancer; (3) Current smoker or have quit smoking within the last 15 years; (4) You have a tobacco smoking history of at least 20 pack years (packs per day x number of years you smoked); (5) You get a written order from a healthcare provider.  Glaucoma Screening: covered annually if you're considered high risk: (1) You have diabetes OR (2) Family history of glaucoma OR (3)  aged 50 and older OR (4)  American aged 65 and older  Osteoporosis Screening: covered every 2 years if you meet one of the following conditions: (1) Have a vertebral abnormality; (2) On glucocorticoid therapy for more than 3 months; (3) Have primary hyperparathyroidism; (4) On osteoporosis medications and need to assess response to drug therapy.  HIV Screening: covered annually if you're between the age of 15-65. Also covered annually if you are younger than 15 and older than 65 with risk factors for HIV infection. For pregnant patients, it is covered up to 3 times per pregnancy.    Immunizations:  Immunization Recommendations   Influenza Vaccine Annual influenza vaccination during flu season is recommended for all persons aged >= 6 months who do not have contraindications   Pneumococcal Vaccine   * Pneumococcal conjugate vaccine = PCV13 (Prevnar 13), PCV15 (Vaxneuvance), PCV20 (Prevnar 20)  * Pneumococcal  polysaccharide vaccine = PPSV23 (Pneumovax) Adults 19-63 yo with certain risk factors or if 65+ yo  If never received any pneumonia vaccine: recommend Prevnar 20 (PCV20)  Give PCV20 if previously received 1 dose of PCV13 or PPSV23   Hepatitis B Vaccine 3 dose series if at intermediate or high risk (ex: diabetes, end stage renal disease, liver disease)   Respiratory syncytial virus (RSV) Vaccine - COVERED BY MEDICARE PART D  * RSVPreF3 (Arexvy) CDC recommends that adults 60 years of age and older may receive a single dose of RSV vaccine using shared clinical decision-making (SCDM)   Tetanus (Td) Vaccine - COST NOT COVERED BY MEDICARE PART B Following completion of primary series, a booster dose should be given every 10 years to maintain immunity against tetanus. Td may also be given as tetanus wound prophylaxis.   Tdap Vaccine - COST NOT COVERED BY MEDICARE PART B Recommended at least once for all adults. For pregnant patients, recommended with each pregnancy.   Shingles Vaccine (Shingrix) - COST NOT COVERED BY MEDICARE PART B  2 shot series recommended in those 19 years and older who have or will have weakened immune systems or those 50 years and older     Health Maintenance Due:      Topic Date Due   • Lung Cancer Screening  05/06/2023   • Colorectal Cancer Screening  01/14/2026   • Hepatitis C Screening  Completed     Immunizations Due:      Topic Date Due   • COVID-19 Vaccine (1) Never done   • Influenza Vaccine (1) 09/01/2023     Advance Directives   What are advance directives?  Advance directives are legal documents that state your wishes and plans for medical care. These plans are made ahead of time in case you lose your ability to make decisions for yourself. Advance directives can apply to any medical decision, such as the treatments you want, and if you want to donate organs.   What are the types of advance directives?  There are many types of advance directives, and each state has rules about how to use  them. You may choose a combination of any of the following:  Living will:  This is a written record of the treatment you want. You can also choose which treatments you do not want, which to limit, and which to stop at a certain time. This includes surgery, medicine, IV fluid, and tube feedings.   Durable power of  for healthcare (DPAHC):  This is a written record that states who you want to make healthcare choices for you when you are unable to make them for yourself. This person, called a proxy, is usually a family member or a friend. You may choose more than 1 proxy.  Do not resuscitate (DNR) order:  A DNR order is used in case your heart stops beating or you stop breathing. It is a request not to have certain forms of treatment, such as CPR. A DNR order may be included in other types of advance directives.  Medical directive:  This covers the care that you want if you are in a coma, near death, or unable to make decisions for yourself. You can list the treatments you want for each condition. Treatment may include pain medicine, surgery, blood transfusions, dialysis, IV or tube feedings, and a ventilator (breathing machine).  Values history:  This document has questions about your views, beliefs, and how you feel and think about life. This information can help others choose the care that you would choose.  Why are advance directives important?  An advance directive helps you control your care. Although spoken wishes may be used, it is better to have your wishes written down. Spoken wishes can be misunderstood, or not followed. Treatments may be given even if you do not want them. An advance directive may make it easier for your family to make difficult choices about your care.   Weight Management   Why it is important to manage your weight:  Being overweight increases your risk of health conditions such as heart disease, high blood pressure, type 2 diabetes, and certain types of cancer. It can also  increase your risk for osteoarthritis, sleep apnea, and other respiratory problems. Aim for a slow, steady weight loss. Even a small amount of weight loss can lower your risk of health problems.  How to lose weight safely:  A safe and healthy way to lose weight is to eat fewer calories and get regular exercise. You can lose up about 1 pound a week by decreasing the number of calories you eat by 500 calories each day.   Healthy meal plan for weight management:  A healthy meal plan includes a variety of foods, contains fewer calories, and helps you stay healthy. A healthy meal plan includes the following:  Eat whole-grain foods more often.  A healthy meal plan should contain fiber. Fiber is the part of grains, fruits, and vegetables that is not broken down by your body. Whole-grain foods are healthy and provide extra fiber in your diet. Some examples of whole-grain foods are whole-wheat breads and pastas, oatmeal, brown rice, and bulgur.  Eat a variety of vegetables every day.  Include dark, leafy greens such as spinach, kale, eleanor greens, and mustard greens. Eat yellow and orange vegetables such as carrots, sweet potatoes, and winter squash.   Eat a variety of fruits every day.  Choose fresh or canned fruit (canned in its own juice or light syrup) instead of juice. Fruit juice has very little or no fiber.  Eat low-fat dairy foods.  Drink fat-free (skim) milk or 1% milk. Eat fat-free yogurt and low-fat cottage cheese. Try low-fat cheeses such as mozzarella and other reduced-fat cheeses.  Choose meat and other protein foods that are low in fat.  Choose beans or other legumes such as split peas or lentils. Choose fish, skinless poultry (chicken or turkey), or lean cuts of red meat (beef or pork). Before you cook meat or poultry, cut off any visible fat.   Use less fat and oil.  Try baking foods instead of frying them. Add less fat, such as margarine, sour cream, regular salad dressing and mayonnaise to foods. Eat  "fewer high-fat foods. Some examples of high-fat foods include french fries, doughnuts, ice cream, and cakes.  Eat fewer sweets.  Limit foods and drinks that are high in sugar. This includes candy, cookies, regular soda, and sweetened drinks.  Exercise:  Exercise at least 30 minutes per day on most days of the week. Some examples of exercise include walking, biking, dancing, and swimming. You can also fit in more physical activity by taking the stairs instead of the elevator or parking farther away from stores. Ask your healthcare provider about the best exercise plan for you.   Alcohol Use and Your Health    Drinking too much can harm your health.  Excessive alcohol use leads to about 88,000 death in the [place] each year, and shortens the life of those who diet by almost 30 years.  Further, excessive drinking cost the economy $249 billion in 2010.  Most excessive drinkers are not alcohol dependent.    Excessive alcohol use has immediate effects that increase the risk of many harmful health conditions.  These are most often the result of binge drinking.  Over time, excessive alcohol use can lead to the development of chronic diseases and other series health problems.    What is considered a \"drink\"?        Excessive alcohol use includes:  Binge Drinking: For women, 4 or more drinks consumed on one occasion. For men, 5 or more drinks consumed on one occasion.  Heavy Drinking: For women, 8 or more drinks per week. For men, 15 or more drinks per week  Any alcohol used by pregnant women  Any alcohol used by those under the age of 21 years    If you choose to drink, do so in moderation:  Do not drink at all if you are under the age of 21, or if you are or may be pregnant, or have health problems that could be made worse by drinking.  For women, up to 1 drink per day  For men, up to 2 drinks a day    No one should begin drinking or drink more frequently based on potential health benefits    Short-Term Health " Risks:  Injuries: motor vehicle crashes, falls, drownings, burns  Violence: homicide, suicide, sexual assault, intimate partner violence  Alcohol poisoning  Reproductive health: risky sexual behaviors, unintended prengnacy, sexually transmitted diseases, miscarriage, stillbirth, fetal alcohol syndrome    Long-Term Health Risks:  Chronic diseases: high blood pressure, heart disease, stroke, liver disease, digestive problems  Cancers: breast, mouth and throat, liver, colon  Learning and memory problems: dementia, poor school performance  Mental health: depression, anxiety, insomnia  Social problems: lost productivity, family problems, unemployment  Alcohol dependence    For support and more information:  Substance Abuse and Mental Health Services Administration  [address]  [place] [address]  Web Address: http://www.samhsa.gov    Alcoholics Anonymous        Web Address: http://www.aa.org    https://www.cdc.gov/alcohol/fact-sheets/alcohol-use.htm  Narcotic (Opioid) Safety    Use narcotics safely:  Take prescribed narcotics exactly as directed  Do not give narcotics to others or take narcotics that belong to someone else  Do not mix narcotics without medicines or alcohol  Do not drive or operate heavy machinery after you take the narcotic  Monitor for side effects and notify your healthcare provider if you experienced side effects such as nausea, sleepiness, itching, or trouble thinking clearly.    Manage constipation:    Constipation is the most common side effect of narcotic medicine. Constipation is when you have hard, dry bowel movements, or you go longer than usual between bowel movements. Tell your healthcare provider about all changes in your bowel movements while you are taking narcotics. He or she may recommend laxative medicine to help you have a bowel movement. He or she may also change the kind of narcotic you are taking, or change when you take it. The following are more ways you can prevent or  relieve constipation:    Drink liquids as directed.  You may need to drink extra liquids to help soften and move your bowels. Ask how much liquid to drink each day and which liquids are best for you.  Eat high-fiber foods.  This may help decrease constipation by adding bulk to your bowel movements. High-fiber foods include fruits, vegetables, whole-grain breads and cereals, and beans. Your healthcare provider or dietitian can help you create a high-fiber meal plan. Your provider may also recommend a fiber supplement if you cannot get enough fiber from food.  Exercise regularly.  Regular physical activity can help stimulate your intestines. Walking is a good exercise to prevent or relieve constipation. Ask which exercises are best for you.  Schedule a time each day to have a bowel movement.  This may help train your body to have regular bowel movements. Bend forward while you are on the toilet to help move the bowel movement out. Sit on the toilet for at least 10 minutes, even if you do not have a bowel movement.    Store narcotics safely:   Store narcotics where others cannot easily get them.  Keep them in a locked cabinet or secure area. Do not  keep them in a purse or other bag you carry with you. A person may be looking for something else and find the narcotics.  Make sure narcotics are stored out of the reach of children.  A child can easily overdose on narcotics. Narcotics may look like candy to a small child.    The best way to dispose of narcotics:      The laws vary by country and area. In the [place], the best way is to return the narcotics through a take-back program. This program is offered by the [place] Drug Enforcement Agency (TOMMY). The following are options for using the program:  Take the narcotics to a TOMMY collection site.  The site is often a law enforcement center. Call your local law enforcement center for scheduled take-back days in your area. You will be given information on where to go if the  collection site is in a different location.  Take the narcotics to an approved pharmacy or hospital.  A pharmacy or hospital may be set up as a collection site. You will need to ask if it is a [employer]MY collection site if you were not directed there. A pharmacy or doctor's office may not be able to take back narcotics unless it is a TOMMY site.  Use a mail-back system.  This means you are given containers to put the narcotics into. You will then mail them in the containers.  Use a take-back drop box.  This is a place to leave the narcotics at any time. People and animals will not be able to get into the box. Your local law enforcement agency can tell you where to find a drop box in your area.    Other ways to manage pain:   Ask your healthcare provider about non-narcotic medicines to control pain.  Nonprescription medicines include NSAIDs (such as ibuprofen) and acetaminophen. Prescription medicines include muscle relaxers, antidepressants, and steroids.  Pain may be managed without any medicines.  Some ways to relieve pain include massage, aromatherapy, or meditation. Physical or occupational therapy may also help.    For more information:   Drug Enforcement Administration  [address] [place] [address]  Phone: 8- [phone] - [phone]  Web Address: https://www.deadiversion.[facility].gov/drug_disposal/    [place] Food and Drug Administration  [address] [place] [address]  Phone: 4- 934 - 489-4046  Web Address: http://www.fda.gov     © Copyright [employer] 2018 Information is for End User's use only and may not be sold, redistributed or otherwise used for commercial purposes. All illustrations and images included in CareNotes® are the copyrighted property of [employer] or [employer]

## 2024-04-04 NOTE — PROGRESS NOTES
Assessment and Plan:   1. Type 2 diabetes mellitus with diabetic polyneuropathy, with long-term current use of insulin (HCC)  Stable.  Last A1c was very well-controlled.  Continue with his regular follow-up with endocrinology as well as his current treatment with his Tresiba.    2. CLL (chronic lymphocytic leukemia) (HCC)  Stable today.  Patient has been on his chemotherapy.  He is following up with oncology regularly.  He recently did have a leukemia/lymphoma send flow cytometry which is still pending.    3. Primary hypertension  Blood pressure appears stable today.  Continue with routine home monitoring as well as his current treatment with losartan as well as amlodipine.    4. Mixed hyperlipidemia  Recheck lipid panel.  Continue with a strict low-fat/low-cholesterol diet as well as his current treatment with fenofibrate    5. Chronic obstructive pulmonary disease, unspecified COPD type (HCC)  Stable.  At this time, continue with his current inhaler treatment Of albuterol.    6. Anxiety disorder, unspecified type  Stable.  Continue with current treatment of citalopram.    7. Gastroesophageal reflux disease with esophagitis, unspecified whether hemorrhage  Stable.  Continue with dietary trigger avoidance as well as current treatment with pantoprazole.        Problem List Items Addressed This Visit          Cardiovascular and Mediastinum    Hypertension       Respiratory    Chronic obstructive pulmonary disease (HCC)       Digestive    Esophagitis, reflux       Endocrine    Type 2 diabetes mellitus with diabetic polyneuropathy, with long-term current use of insulin (HCC)       Oncology    CLL (chronic lymphocytic leukemia) (HCC)       Other    Hyperlipidemia     Other Visit Diagnoses       Medicare annual wellness visit, subsequent    -  Primary    Anxiety disorder, unspecified type                 Preventive health issues were discussed with patient, and age appropriate screening tests were ordered as noted in  patient's After Visit Summary.  Personalized health advice and appropriate referrals for health education or preventive services given if needed, as noted in patient's After Visit Summary.     History of Present Illness:     Patient presents for a Medicare Wellness Visit as well as a follow-up on his chronic conditions.  He has type 2 diabetes, CLL, hypertension, dyslipidemia, COPD, anxiety disorder, GERD.  Has been taking his medications regularly.  He denies adverse reactions with his medications.  He does follow-up with oncology regularly.    HPI   Patient Care Team:  Meseret Elliott DO as PCP - General (Family Medicine)  Halie No MD as PCP - Endocrinology (Endocrinology)  MD Winifred Jones DO Rakesh Malhotra, MD William Richard Burfeind, MD Daniel Scott Heckman, MD (Orthopedic Surgery)  Momo Ba MD (Cardiology)  Catalina Randolph PA-C as Physician Assistant (Endocrinology)     Review of Systems:     Review of Systems   Constitutional:  Negative for activity change, chills, fatigue and fever.   HENT:  Negative for congestion, ear pain, sinus pressure and sore throat.    Eyes:  Negative for redness, itching and visual disturbance.   Respiratory:  Negative for cough and shortness of breath.    Cardiovascular:  Negative for chest pain and palpitations.   Gastrointestinal:  Negative for abdominal pain, diarrhea and nausea.   Endocrine: Negative for cold intolerance and heat intolerance.   Genitourinary:  Negative for dysuria, flank pain and frequency.   Musculoskeletal:  Negative for arthralgias, back pain, gait problem and myalgias.   Skin:  Negative for color change.   Allergic/Immunologic: Negative for environmental allergies.   Neurological:  Negative for dizziness, numbness and headaches.   Psychiatric/Behavioral:  Negative for behavioral problems and sleep disturbance.         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)    Anemia, chronic disease    Chronic lymphocytic leukemia (HCC)    Leukopenia due to antineoplastic chemotherapy (HCC)    Hyperglycemia    Cellulitis of head or scalp    Pancytopenia (HCC)    Headache around the eyes    Gastroesophageal reflux disease without esophagitis    Colitis, acute    Listeria bacteremia    Thrombocytopenia (HCC)    Type 2 diabetes mellitus with hyperglycemia, with long-term current use of insulin (HCC)    Chronic right shoulder pain    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HCC)    Ulcer of esophagus without bleeding    Esophageal stricture    Dysphagia    Esophageal dysphagia    Acute bursitis of right shoulder    Hypogammaglobulinemia, acquired (HCC)    Autoimmune hemolytic anemia (HCC)    Right upper quadrant abdominal pain    Chronic obstructive pulmonary disease (HCC)    Depression, recurrent (HCC)    Long term (current) use of systemic steroids    Cancer related pain    Preop cardiovascular exam    Type 2 diabetes mellitus with diabetic polyneuropathy, with long-term current use of insulin (HCC)    Port-A-Cath in place      Past Medical and Surgical History:     Past Medical History:   Diagnosis Date    Allergic     Anemia     Arthritis     CAD (coronary artery disease) 2004    Cancer (HCC)     Leukemia    Cellulitis of scalp     CKD (chronic kidney disease) stage 3, GFR 30-59 ml/min (HCC)     CLL (chronic lymphocytic leukemia) (HCC)     COPD (chronic obstructive pulmonary disease) (HCC)     Diabetes mellitus (HCC)     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    Dyslipidemia     Edema extremities     Esophageal ulcer     H/O blood clots     Hemolytic anemia (HCC)     Hiatal  hernia     History of TIA (transient ischemic attack)     Hyperlipidemia     Hypertension     Listeria infection 2018    Multiple gastric ulcers     Myocardial infarction (HCC) 2004    acute    Neutropenia (HCC)     Obese     Recurrent sinusitis     Thrombocytopenia (HCC)     Vertigo      Past Surgical History:   Procedure Laterality Date    ARTHROSCOPIC REPAIR ACL      lt knee    CARDIAC SURGERY      cardiac cath with stent    FOOT SURGERY      IR PORT CHECK  5/17/2019    KNEE ARTHROSCOPY      OTHER SURGICAL HISTORY      cardiac cath lesion 1, 1st adjunct treat device: stent    PORTACATH PLACEMENT      ME ESOPHAGOGASTRODUODENOSCOPY TRANSORAL DIAGNOSTIC N/A 10/5/2017    Procedure: ESOPHAGOGASTRODUODENOSCOPY (EGD) with bx;  Surgeon: Winifred Hansen DO;  Location: [facility];  Service: Gastroenterology    ME ESOPHAGOGASTRODUODENOSCOPY TRANSORAL DIAGNOSTIC N/A 3/8/2018    Procedure: ESOPHAGOGASTRODUODENOSCOPY (EGD) with biopsy;  Surgeon: Winifred Hansen DO;  Location: [facility];  Service: Gastroenterology    ME ESOPHAGOGASTRODUODENOSCOPY TRANSORAL DIAGNOSTIC N/A 6/20/2018    Procedure: ESOPHAGOGASTRODUODENOSCOPY (EGD) with Dilation;  Surgeon: Winifred Hansen DO;  Location: [facility];  Service: Gastroenterology    ME ESOPHAGOGASTRODUODENOSCOPY TRANSORAL DIAGNOSTIC N/A 10/18/2018    Procedure: ESOPHAGOGASTRODUODENOSCOPY (EGD) with dilation;  Surgeon: Edu Mejía MD;  Location: [facility];  Service: Gastroenterology    ME ESOPHAGOSCOPY FLEXIBLE TRANSORAL WITH BIOPSY N/A 9/2/2016    Procedure: ESOPHAGOGASTRODUODENOSCOPY (EGD); ESOPHAGEAL DILATION; ESOPHAGEAL BIOPSY;  Surgeon: Abdi Holcomb MD;  Location: [facility];  Service: Thoracic    TONSILLECTOMY      UPPER GASTROINTESTINAL ENDOSCOPY      x 6      Family History:     Family History   Problem Relation Age of Onset    Leukemia Mother         chronic    Colon polyps Mother         sidmoid    Parkinsonism Father       Social History:     Social History      Socioeconomic History    Marital status: /Civil Union     Spouse name: None    Number of children: None    Years of education: None    Highest education level: None   Occupational History    Occupation: [place] [employer]    Occupation: retired    Tobacco Use    Smoking status: Former     Current packs/day: 0.00     Average packs/day: 2.0 packs/day for 33.0 years (66.0 ttl pk-yrs)     Types: Cigarettes     Start date:      Quit date:      Years since quittin.2     Passive exposure: Past    Smokeless tobacco: Never   Vaping Use    Vaping status: Never Used   Substance and Sexual Activity    Alcohol use: Yes     Alcohol/week: 2.0 standard drinks of alcohol     Types: 2 Cans of beer per week     Comment: social use as per [employer]    Drug use: Never    Sexual activity: None   Other Topics Concern    None   Social History Narrative    None     Social Determinants of Health     Financial Resource Strain: Low Risk  (2024)    Overall Financial Resource Strain (CARDIA)     Difficulty of Paying Living Expenses: Not hard at all   Food Insecurity: Food Insecurity Present (2024)    Hunger Vital Sign     Worried About Running Out of Food in the Last Year: Sometimes true     Ran Out of Food in the Last Year: Sometimes true   Transportation Needs: No Transportation Needs (2024)    PRAPARE - Transportation     Lack of Transportation (Medical): No     Lack of Transportation (Non-Medical): No   Physical Activity: Not on file   Stress: Not on file   Social Connections: Not on file   Intimate Partner Violence: Not on file   Housing Stability: Low Risk  (2024)    Housing Stability Vital Sign     Unable to Pay for Housing in the Last Year: No     Number of Places Lived in the Last Year: 1     Unstable Housing in the Last Year: No      Medications and Allergies:     Current Outpatient Medications   Medication Sig Dispense Refill    acyclovir (ZOVIRAX) 400 MG tablet TAKE 1 TABLET TWICE A DAY 60 tablet  11    Admelog SoloStar 100 units/mL injection pen Inject 12-14 units before each meal 15 mL 0    albuterol (2.5 mg/3 mL) 0.083 % nebulizer solution Take 3 mL (2.5 mg total) by nebulization every 6 (six) hours as needed for wheezing or shortness of breath 180 mL 5    amLODIPine (NORVASC) 10 mg tablet TAKE 1 TABLET DAILY 90 tablet 5    aspirin 81 MG tablet Take 81 mg by mouth daily Every other day      benzonatate (TESSALON) 200 MG capsule TAKE 1 CAPSULE THREE TIMES A DAY AS NEEDED FOR COUGH 20 capsule 51    Blood Glucose Monitoring Suppl (FreeStyle Freedom Lite) w/Device KIT Use 3 (three) times a day 1 kit 0    citalopram (CeleXA) 40 mg tablet TAKE 1 TABLET DAILY 90 tablet 1    Continuous Blood Gluc  ([employer] Ashley 2 [place]) JOE Use to scan sensor premeals and at bedtime 1 each 1    Continuous Blood Gluc Sensor (FreeStyle Ashley 2 Sensor) MISC Apply 1 sensor every 14 days. Use as directed with Freestyle Ashley 2 reader. 2 each 2    Diclofenac Sodium (VOLTAREN) 1 % Apply 4 g topically 4 (four) times a day 100 g 0    fenofibrate (TRICOR) 145 mg tablet TAKE 1 TABLET DAILY 90 tablet 5    folic acid (FOLVITE) 1 mg tablet Take 800 mcg by mouth daily       gabapentin (NEURONTIN) 600 MG tablet TAKE 1 TABLET DAILY 90 tablet 5    glucose blood (FREESTYLE LITE) test strip Check blood sugar 3 times a day 300 strip 1    Ibrutinib 140 MG TABS Take 140 mg by mouth daily 28 tablet 5    insulin degludec (Tresiba FlexTouch) 100 units/mL injection pen Inject 18 Units under the skin daily at bedtime 15 mL 3    Insulin Pen Needle (BD Pen Needle Inez U/F) 32G X 4 MM MISC Use 3 (three) times a day 300 each 0    Lancets (FREESTYLE) lancets Use as instructed--test 2 times a day    E 11.9 100 each 0    Lancets (freestyle) lancets TEST BLOOD SUGAR 3 TIMES A  each 1    LORazepam (ATIVAN) 0.5 mg tablet Take 1 tablet (0.5 mg total) by mouth daily as needed for anxiety 30 tablet 0    losartan (COZAAR) 100 MG tablet TAKE 1 TABLET  DAILY 90 tablet 1    mometasone-formoterol (Dulera) 200-5 MCG/ACT inhaler Inhale 2 puffs 2 (two) times a day Rinse mouth after use. 13 g 1    multivitamin (THERAGRAN) TABS Take 1 tablet by mouth daily      obinutuzumab (GAZYVA) 1000 mg/40 mL SOLN Infuse into a venous catheter      oxyCODONE (ROXICODONE) 10 MG TABS Take 1 tablet (10 mg total) by mouth every 6 (six) hours as needed for moderate pain For ongoing pain control Max Daily Amount: 40 mg 28 tablet 0    pantoprazole (PROTONIX) 40 mg tablet Take 1 tablet (40 mg total) by mouth daily 90 tablet 1    predniSONE 5 mg tablet Take 1 tablet (5 mg total) by mouth daily 90 tablet 0    Ventolin  (90 Base) MCG/ACT inhaler USE 2 INHALATIONS EVERY 6 HOURS AS NEEDED FOR WHEEZING 54 g 5    zolpidem (AMBIEN) 5 mg tablet Take 1 tablet (5 mg total) by mouth daily at bedtime as needed for sleep 30 tablet 0    bisacodyl (DULCOLAX) 5 mg EC tablet Take as directed as per written office instructions (Patient not taking: Reported on 1/4/2024) 2 tablet 0    losartan (COZAAR) 100 MG tablet Take 1 tablet (100 mg total) by mouth daily (Patient not taking: Reported on 1/4/2024) 10 tablet 0    naloxone (NARCAN) 4 mg/0.1 mL nasal spray Administer 1 spray into a nostril. If no response after 2-3 minutes, give another dose in the other nostril using a new spray. (Patient not taking: Reported on 4/4/2024) 1 each 1    naloxone (NARCAN) 4 mg/0.1 mL nasal spray Administer 1 spray into a nostril. If no response after 2-3 minutes, give another dose in the other nostril using a new spray. (Patient not taking: Reported on 8/23/2023) 1 each 1    polyethylene glycol (GOLYTELY) 4000 mL solution Take 4,000 mL by mouth once for 1 dose (Patient not taking: Reported on 4/4/2024) 4000 mL 0    sodium chloride, PF, 0.9 % 10 mL by Intracatheter route see administration instructions 10mL into port before and after ampicillin doses (Patient not taking: Reported on 5/4/2023)  0     No current  facility-administered medications for this visit.     Allergies   Allergen Reactions    Heparin Other (See Comments)     Other reaction(s): Blood Disorders  clot    Macrolides And Ketolides Other (See Comments)     Interacts with other meds he is taking    Simvastatin Myalgia      Immunizations:     Immunization History   Administered Date(s) Administered    INFLUENZA 12/05/2006, 11/02/2007, 10/29/2010, 03/20/2019    Pneumococcal Conjugate 13-Valent 12/09/2019    Pneumococcal Polysaccharide PPV23 03/15/2021      Health Maintenance:         Topic Date Due    Lung Cancer Screening  05/06/2023    Colorectal Cancer Screening  01/14/2026    Hepatitis C Screening  Completed         Topic Date Due    COVID-19 Vaccine (1) Never done    Influenza Vaccine (1) 09/01/2023      Medicare Screening Tests and Risk Assessments:     Cayetano is here for his Subsequent Wellness visit. Last Medicare Wellness visit information reviewed, patient interviewed, no change since last AWV.     Health Risk Assessment:   Patient rates overall health as poor. Patient feels that their physical health rating is much worse. Patient is very satisfied with their life. Eyesight was rated as same. Hearing was rated as same. Patient feels that their emotional and mental health rating is same. Patients states they are never, rarely angry. Patient states they are always unusually tired/fatigued. Pain experienced in the last 7 days has been a lot. Patient's pain rating has been 7/10. Patient states that he has experienced no weight loss or gain in last 6 months.     Depression Screening:   PHQ-9 Score: 0      Fall Risk Screening:   In the past year, patient has experienced: no history of falling in past year      Home Safety:  Patient does not have trouble with stairs inside or outside of their home. Patient has working smoke alarms and has no working carbon monoxide detector. Home safety hazards include: none.     Nutrition:   Current diet is Diabetic.      Medications:   Patient is currently taking over-the-counter supplements. OTC medications include: see medication list. Patient is not able to manage medications.     Activities of Daily Living (ADLs)/Instrumental Activities of Daily Living (IADLs):   Walk and transfer into and out of bed and chair?: Yes  Dress and groom yourself?: Yes    Bathe or shower yourself?: Yes    Feed yourself? Yes  Do your laundry/housekeeping?: Yes  Manage your money, pay your bills and track your expenses?: Yes  Make your own meals?: Yes    Do your own shopping?: Yes    Previous Hospitalizations:   Any hospitalizations or ED visits within the last 12 months?: No      Advance Care Planning:   Living will: Yes    Durable POA for healthcare: Yes    Advanced directive: Yes    Advanced directive counseling given: Yes    Five wishes given: No    Patient declined ACP directive: No    End of Life Decisions reviewed with patient: Yes    Provider agrees with end of life decisions: Yes      Cognitive Screening:   Provider or family/friend/caregiver concerned regarding cognition?: No    PREVENTIVE SCREENINGS      Cardiovascular Screening:    General: Screening Not Indicated, History Lipid Disorder, Risks and Benefits Discussed and Screening Current      Diabetes Screening:     General: Screening Not Indicated, History Diabetes, Risks and Benefits Discussed and Screening Current      Colorectal Cancer Screening:     General: Screening Current      Prostate Cancer Screening:    General: Risks and Benefits Discussed and Screening Not Indicated      Osteoporosis Screening:    General: Risks and Benefits Discussed and Screening Current      Abdominal Aortic Aneurysm (AAA) Screening:    Risk factors include: age between 65-76 yo and tobacco use        General: Risks and Benefits Discussed and Screening Not Indicated      Lung Cancer Screening:     General: Risks and Benefits Discussed and Screening Current      Hepatitis C Screening:    General:  Screening Current and Risks and Benefits Discussed    Screening, Brief Intervention, and Referral to Treatment (SBIRT)    Screening  Typical number of drinks in a day: 1  Typical number of drinks in a week: 7  Interpretation: Low risk drinking behavior.    AUDIT-C Screenin) How often did you have a drink containing alcohol in the past year? 4 or more times a week  2) How many drinks did you have on a typical day when you were drinking in the past year? 1 to 2  3) How often did you have 6 or more drinks on one occasion in the past year? never    AUDIT-C Score: 4  Interpretation: Score 4-12 (male): POSITIVE screen for alcohol misuse    AUDIT Screenin) How often during the last year have you found that you were not able to stop drinking once you had started? 0 - never  5) How often during the last year have you failed to do what was normally expected from you because of drinking? 0 - never  6) How often during the last year have you needed a first drink in the morning to get yourself going after a heavy drinking session? 0 - never  7) How often during the last year have you had a feeling of guilt or remorse after drinking? 0 - never  8) How often during the last year have you been unable to remember what happened the night before because you had been drinking? 0 - never  9) Have you or someone else been injured as a result of your drinking? 0 - no  10) Has a relative or friend or a doctor or another health worker been concerned about your drinking or suggested you cut down? 0 - no    AUDIT Score: 4  Interpretation: Low risk alcohol consumption    SDOH Risk Assessment  Social determinants of health (SDOH) risk assesment tool was completed. The tool at a minimum covered housing stability, food insecurity, transportation needs, and utility difficulty. Patient had at risk responses for the following SDOH domains: food insecurity.     Review of Current Opioid Use    Opioid Risk Tool (ORT) Interpretation: Complete  "Opioid Risk Tool (ORT)    No results found.     Physical Exam:     /72 (BP Location: Left arm, Patient Position: Sitting, Cuff Size: Large)   Pulse 80   Temp 97.5 °F (36.4 °C) (Temporal)   Ht 6' 0.99\" (1.854 m)   Wt 105 kg (231 lb 9.6 oz)   SpO2 96%   BMI 30.56 kg/m²     Physical Exam  Vitals reviewed.   Constitutional:       General: He is not in acute distress.     Appearance: He is well-developed. He is not diaphoretic.   HENT:      Head: Normocephalic and atraumatic. No right periorbital erythema or left periorbital erythema.      Right Ear: Tympanic membrane normal. No decreased hearing noted.      Left Ear: Tympanic membrane normal. No decreased hearing noted.      Nose: Nose normal.      Right Sinus: No maxillary sinus tenderness or frontal sinus tenderness.      Left Sinus: No maxillary sinus tenderness or frontal sinus tenderness.      Mouth/Throat:      Lips: Pink.      Mouth: Mucous membranes are moist.      Pharynx: No oropharyngeal exudate.      Tonsils: No tonsillar exudate or tonsillar abscesses.   Eyes:      General: Lids are normal.      Extraocular Movements: Extraocular movements intact.      Conjunctiva/sclera: Conjunctivae normal.      Pupils: Pupils are equal, round, and reactive to light.   Neck:      Thyroid: No thyroid mass.      Trachea: Trachea normal.   Cardiovascular:      Rate and Rhythm: Normal rate and regular rhythm.      Pulses: Normal pulses.      Heart sounds: Normal heart sounds. No murmur heard.     No friction rub. No gallop.   Pulmonary:      Effort: Pulmonary effort is normal. No respiratory distress.      Breath sounds: Normal breath sounds. No wheezing or rales.   Abdominal:      General: Bowel sounds are normal. There is no distension.      Palpations: Abdomen is soft. There is no mass.      Tenderness: There is no abdominal tenderness. There is no guarding.   Musculoskeletal:         General: Normal range of motion.      Cervical back: Normal range of motion " and neck supple.   Skin:     General: Skin is warm and dry.      Coloration: Skin is not pale.      Findings: No erythema or rash.   Neurological:      Mental Status: He is alert and oriented to person, place, and time.      Cranial Nerves: No cranial nerve deficit.      Coordination: Coordination normal.   Psychiatric:         Attention and Perception: Attention and perception normal.         Mood and Affect: Mood and affect normal.         Speech: Speech normal.         Behavior: Behavior normal.         Thought Content: Thought content normal.         Cognition and Memory: Cognition and memory normal.         Judgment: Judgment normal.          Meseret Elliott, DO

## 2024-04-05 ENCOUNTER — TELEPHONE (OUTPATIENT)
Dept: HEMATOLOGY ONCOLOGY | Facility: CLINIC | Age: 69
End: 2024-04-05

## 2024-04-05 NOTE — TELEPHONE ENCOUNTER
Please advise patient that the blood test to check if he has any active CLL in his blood is negative; meaning CLL is still in complete remission. Other labs are also good.     He should return back to her regular blood draw schedule.

## 2024-04-09 ENCOUNTER — TELEPHONE (OUTPATIENT)
Dept: GASTROENTEROLOGY | Facility: CLINIC | Age: 69
End: 2024-04-09

## 2024-04-10 ENCOUNTER — TELEPHONE (OUTPATIENT)
Dept: HEMATOLOGY ONCOLOGY | Facility: CLINIC | Age: 69
End: 2024-04-10

## 2024-04-10 ENCOUNTER — OFFICE VISIT (OUTPATIENT)
Dept: HEMATOLOGY ONCOLOGY | Facility: CLINIC | Age: 69
End: 2024-04-10
Payer: MEDICARE

## 2024-04-10 VITALS
SYSTOLIC BLOOD PRESSURE: 128 MMHG | OXYGEN SATURATION: 95 % | HEIGHT: 73 IN | TEMPERATURE: 97.4 F | WEIGHT: 232 LBS | DIASTOLIC BLOOD PRESSURE: 70 MMHG | RESPIRATION RATE: 18 BRPM | BODY MASS INDEX: 30.75 KG/M2 | HEART RATE: 93 BPM

## 2024-04-10 DIAGNOSIS — T50.905A DRUG-INDUCED OSTEOPOROSIS: ICD-10-CM

## 2024-04-10 DIAGNOSIS — E11.42 TYPE 2 DIABETES MELLITUS WITH DIABETIC POLYNEUROPATHY, WITH LONG-TERM CURRENT USE OF INSULIN (HCC): ICD-10-CM

## 2024-04-10 DIAGNOSIS — J44.9 CHRONIC OBSTRUCTIVE PULMONARY DISEASE, UNSPECIFIED COPD TYPE (HCC): ICD-10-CM

## 2024-04-10 DIAGNOSIS — D80.1 HYPOGAMMAGLOBULINEMIA, ACQUIRED (HCC): ICD-10-CM

## 2024-04-10 DIAGNOSIS — C91.10 CLL (CHRONIC LYMPHOCYTIC LEUKEMIA) (HCC): ICD-10-CM

## 2024-04-10 DIAGNOSIS — M81.8 DRUG-INDUCED OSTEOPOROSIS: ICD-10-CM

## 2024-04-10 DIAGNOSIS — D69.6 THROMBOCYTOPENIA (HCC): ICD-10-CM

## 2024-04-10 DIAGNOSIS — I25.10 CORONARY ARTERY DISEASE INVOLVING NATIVE CORONARY ARTERY OF NATIVE HEART WITHOUT ANGINA PECTORIS: ICD-10-CM

## 2024-04-10 DIAGNOSIS — G89.3 CANCER RELATED PAIN: Primary | ICD-10-CM

## 2024-04-10 DIAGNOSIS — I10 PRIMARY HYPERTENSION: ICD-10-CM

## 2024-04-10 DIAGNOSIS — D75.829 HIT (HEPARIN-INDUCED THROMBOCYTOPENIA) (HCC): ICD-10-CM

## 2024-04-10 DIAGNOSIS — Z79.4 TYPE 2 DIABETES MELLITUS WITH DIABETIC POLYNEUROPATHY, WITH LONG-TERM CURRENT USE OF INSULIN (HCC): ICD-10-CM

## 2024-04-10 DIAGNOSIS — R76.8 LOW SERUM IGG FOR AGE: ICD-10-CM

## 2024-04-10 DIAGNOSIS — D59.10 AUTOIMMUNE HEMOLYTIC ANEMIA (HCC): ICD-10-CM

## 2024-04-10 DIAGNOSIS — N18.30 STAGE 3 CHRONIC KIDNEY DISEASE, UNSPECIFIED WHETHER STAGE 3A OR 3B CKD (HCC): ICD-10-CM

## 2024-04-10 PROCEDURE — G2211 COMPLEX E/M VISIT ADD ON: HCPCS | Performed by: INTERNAL MEDICINE

## 2024-04-10 PROCEDURE — 99214 OFFICE O/P EST MOD 30 MIN: CPT | Performed by: INTERNAL MEDICINE

## 2024-04-10 RX ORDER — PREDNISONE 5 MG/1
5 TABLET ORAL DAILY
Qty: 90 TABLET | Refills: 0 | Status: SHIPPED | OUTPATIENT
Start: 2024-04-10

## 2024-04-10 RX ORDER — ACETAMINOPHEN 325 MG/1
650 TABLET ORAL ONCE
OUTPATIENT
Start: 2024-04-16

## 2024-04-10 RX ORDER — SODIUM CHLORIDE 9 MG/ML
20 INJECTION, SOLUTION INTRAVENOUS ONCE
OUTPATIENT
Start: 2024-04-16

## 2024-04-10 NOTE — PROGRESS NOTES
HPI: Continuation of care for a longstanding history of CLL, diagnosed more than 20 years ago with complications along the way and presently patient's condition is stable  on 140 mg ibrutinib daily, Gazyva once a month and IVIG infusion once a month,  for stage IV CLL.  CLL condition has remained stable on this program for a long time.  Also he is on 5 mg prednisone daily and folic acid.  He is on acyclovir.  He is taking calcium and vitamin D.  He will go for bone density test.  CLL was diagnosed more than 20 years ago and he had multiple lines of therapies and at 1 time his hemoglobin was down to 4.9 because of  autoimmune hemolytic anemia and CLL.    CLL was diagnosed several years ago. He had been through Fludara based chemotherapy and pentostatin based chemotherapy. He had increased hemolysis when he was on chemotherapy. His most recent chemotherapy treatment was Cytoxan, Oncovin, prednisone and Rituxan and last treatment was in December 2012.. In 2013 he was started on Rituxan, prednisone and folic acid because he started to hemolyse again. IVIG was tried unsuccessfully so far as hemolysis is concerned. Rituxan has controlled the hemolysis. ...  Information on the treatments from March 2017 onwards.  On 3/20/17 patient found to have hemoglobin of 6.2, ,000. He was admitted to [facility] for blood transfusion and plan to transfer to [facility].   On 3/20/17 WBC count 195,000, hemoglobin 4.9, platelet count 65. Direct coomb's was positive with undetectable haptoglobin. He was given 2 units of PRBCs, Solu-Medrol 1 g ×3 days and IVIG 1 g/kg daily ×3 days. His hemoglobin continued to drop. Bone marrow biopsy on 3/21 showed marrow cellularity of greater than 95% almost replaced by small lymphocytes, lambda light chain restricted, CD20 positive, CD19 positive, CD23 positive partially expressing CD11c and CD25 and CD5 positive and negative for CD 79 and CD38.  He was  treated with single dose of chlorambucil to control his CLL.  3/22 second dose of chlorambucil, also Rituxan 375 mg/M ² autoimmune hemolytic anemia. WBC continued to rise, 266,000.  Patient was initiated with Venetoclax 20 mg daily. Tumor lysis monitored every 8 hours; given aggressive hydration and Lasix and remained euvolemic.  Later venetoclax was changed to ibrutinib because of disease progression  Dec 2017- Imbruvica decreased to 140 mg PO daily due to thrombocytopenia .  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 He was discharged on IV ampicillin. Echo negative for vegetations.   He is doing very well on present treatment plan so far as CLL is concerned  Patient had cataract surgeries in December 2023   He has tiredness, chronic nonproductive cough, exertional dyspnea, occasionally wheezing, arthritic symptoms and for that he is on oxycodone.  He has  vascular purpura on forearms.  He has chronic lung disease.  History of diabetes mellitus, hypertension and coronary artery disease.  .  For dysphagia he had EGD and dilatation in September 2020 and he follows with his gastroenterologist.   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    He has coronary artery disease and has 1 stent and he takes 81 mg aspirin daily with food.     . . 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                   Current Outpatient Medications:   •  acyclovir (ZOVIRAX) 400 MG tablet, TAKE 1 TABLET TWICE A DAY, Disp: 60 tablet, Rfl: 11  •  Admelog SoloStar 100 units/mL injection pen, Inject 12-14 units before each meal, Disp: 15 mL, Rfl: 0  •  albuterol (2.5 mg/3 mL) 0.083 % nebulizer solution, Take 3 mL (2.5 mg total) by nebulization every 6 (six) hours as needed for wheezing or shortness of breath, Disp: 180 mL, Rfl: 5  •  amLODIPine (NORVASC) 10 mg tablet, TAKE 1 TABLET  DAILY, Disp: 90 tablet, Rfl: 5  •  aspirin 81 MG tablet, Take 81 mg by mouth daily Every other day, Disp: , Rfl:   •  benzonatate (TESSALON) 200 MG capsule, TAKE 1 CAPSULE THREE TIMES A DAY AS NEEDED FOR COUGH, Disp: 20 capsule, Rfl: 51  •  Blood Glucose Monitoring Suppl (FreeStyle Freedom Lite) w/Device KIT, Use 3 (three) times a day, Disp: 1 kit, Rfl: 0  •  citalopram (CeleXA) 40 mg tablet, TAKE 1 TABLET DAILY, Disp: 90 tablet, Rfl: 1  •  Continuous Blood Gluc  (FreeStyle Ashley 2 [place]) JOE, Use to scan sensor premeals and at bedtime, Disp: 1 each, Rfl: 1  •  Continuous Blood Gluc Sensor (FreeStyle Ashley 2 Sensor) MISC, Apply 1 sensor every 14 days. Use as directed with Freestyle Ashley 2 reader., Disp: 2 each, Rfl: 2  •  Diclofenac Sodium (VOLTAREN) 1 %, Apply 4 g topically 4 (four) times a day, Disp: 100 g, Rfl: 0  •  fenofibrate (TRICOR) 145 mg tablet, TAKE 1 TABLET DAILY, Disp: 90 tablet, Rfl: 5  •  folic acid (FOLVITE) 1 mg tablet, Take 800 mcg by mouth daily , Disp: , Rfl:   •  gabapentin (NEURONTIN) 600 MG tablet, TAKE 1 TABLET DAILY, Disp: 90 tablet, Rfl: 5  •  glucose blood (FREESTYLE LITE) test strip, Check blood sugar 3 times a day, Disp: 300 strip, Rfl: 1  •  Ibrutinib 140 MG TABS, Take 140 mg by mouth daily, Disp: 28 tablet, Rfl: 5  •  insulin degludec (Tresiba FlexTouch) 100 units/mL injection pen, Inject 18 Units under the skin daily at bedtime, Disp: 15 mL, Rfl: 3  •  Insulin Pen Needle (BD Pen Needle Inez U/F) 32G X 4 MM MISC, Use 3 (three) times a day, Disp: 300 each, Rfl: 0  •  Lancets (FREESTYLE) lancets, Use as instructed--test 2 times a day  E 11.9, Disp: 100 each, Rfl: 0  •  Lancets (freestyle) lancets, TEST BLOOD SUGAR 3 TIMES A DAY, Disp: 300 each, Rfl: 1  •  LORazepam (ATIVAN) 0.5 mg tablet, Take 1 tablet (0.5 mg total) by mouth daily as needed for anxiety, Disp: 30 tablet, Rfl: 0  •  losartan (COZAAR) 100 MG tablet, TAKE 1 TABLET DAILY, Disp: 90 tablet, Rfl: 1  •   mometasone-formoterol (Dulera) 200-5 MCG/ACT inhaler, Inhale 2 puffs 2 (two) times a day Rinse mouth after use., Disp: 13 g, Rfl: 1  •  multivitamin (THERAGRAN) TABS, Take 1 tablet by mouth daily, Disp: , Rfl:   •  obinutuzumab (GAZYVA) 1000 mg/40 mL SOLN, Infuse into a venous catheter, Disp: , Rfl:   •  oxyCODONE (ROXICODONE) 10 MG TABS, Take 1 tablet (10 mg total) by mouth every 6 (six) hours as needed for moderate pain For ongoing pain control Max Daily Amount: 40 mg, Disp: 28 tablet, Rfl: 0  •  pantoprazole (PROTONIX) 40 mg tablet, Take 1 tablet (40 mg total) by mouth daily, Disp: 90 tablet, Rfl: 1  •  predniSONE 5 mg tablet, Take 1 tablet (5 mg total) by mouth daily, Disp: 90 tablet, Rfl: 0  •  Ventolin  (90 Base) MCG/ACT inhaler, USE 2 INHALATIONS EVERY 6 HOURS AS NEEDED FOR WHEEZING, Disp: 54 g, Rfl: 5  •  zolpidem (AMBIEN) 5 mg tablet, Take 1 tablet (5 mg total) by mouth daily at bedtime as needed for sleep, Disp: 30 tablet, Rfl: 0  •  bisacodyl (DULCOLAX) 5 mg EC tablet, Take as directed as per written office instructions (Patient not taking: Reported on 1/4/2024), Disp: 2 tablet, Rfl: 0  •  losartan (COZAAR) 100 MG tablet, Take 1 tablet (100 mg total) by mouth daily (Patient not taking: Reported on 1/4/2024), Disp: 10 tablet, Rfl: 0  •  naloxone (NARCAN) 4 mg/0.1 mL nasal spray, Administer 1 spray into a nostril. If no response after 2-3 minutes, give another dose in the other nostril using a new spray. (Patient not taking: Reported on 4/4/2024), Disp: 1 each, Rfl: 1  •  naloxone (NARCAN) 4 mg/0.1 mL nasal spray, Administer 1 spray into a nostril. If no response after 2-3 minutes, give another dose in the other nostril using a new spray. (Patient not taking: Reported on 8/23/2023), Disp: 1 each, Rfl: 1  •  polyethylene glycol (GOLYTELY) 4000 mL solution, Take 4,000 mL by mouth once for 1 dose (Patient not taking: Reported on 4/4/2024), Disp: 4000 mL, Rfl: 0  •  sodium chloride, PF, 0.9 %, 10 mL by  Intracatheter route see administration instructions 10mL into port before and after ampicillin doses (Patient not taking: Reported on 5/4/2023), Disp: , Rfl: 0    Allergies   Allergen Reactions   • Heparin Other (See Comments)     Other reaction(s): Blood Disorders  clot   • Macrolides And Ketolides Other (See Comments)     Interacts with other meds he is taking   • Simvastatin Myalgia       Oncology History   CLL (chronic lymphocytic leukemia) ([facility])   8/22/2017 -  Chemotherapy    chlorambucil (LEUKERAN), 0.5 mg/kg, Oral, Every 14 days, 6 of 6 cycles  obinutuzumab (GAZYVA) day 1 IVPB, 100 mg, Intravenous, Once, 1 of 1 cycle  obinutuzumab (GAZYVA) day 2 titrated infusion, 900 mg, Intravenous, Once, 1 of 1 cycle  obinutuzumab (GAZYVA) subsequent titrated infusion, 1,000 mg, Intravenous, Once, 67 of 70 cycles  Administration: 1,000 mg (5/21/2019), 1,000 mg (6/18/2019), 1,000 mg (7/16/2019), 1,000 mg (8/13/2019), 1,000 mg (9/10/2019), 1,000 mg (10/8/2019), 1,000 mg (11/5/2019), 1,000 mg (12/3/2019), 1,000 mg (12/31/2019), 1,000 mg (1/28/2020), 1,000 mg (2/25/2020), 1,000 mg (3/24/2020), 1,000 mg (4/21/2020), 1,000 mg (5/19/2020), 1,000 mg (6/16/2020), 1,000 mg (7/14/2020), 1,000 mg (8/11/2020), 1,000 mg (9/9/2020), 1,000 mg (10/6/2020), 1,000 mg (11/3/2020), 1,000 mg (12/1/2020), 1,000 mg (1/26/2021), 1,000 mg (12/29/2020), 1,000 mg (2/23/2021), 1,000 mg (3/23/2021), 1,000 mg (4/20/2021), 1,000 mg (5/18/2021), 1,000 mg (6/15/2021), 1,000 mg (7/13/2021), 1,000 mg (8/10/2021), 1,000 mg (9/7/2021), 1,000 mg (10/5/2021), 1,000 mg (11/2/2021), 1,000 mg (11/30/2021), 1,000 mg (12/28/2021), 1,000 mg (1/25/2022), 1,000 mg (2/22/2022), 1,000 mg (3/22/2022), 1,000 mg (5/17/2022), 1,000 mg (6/14/2022), 1,000 mg (7/12/2022), 1,000 mg (8/9/2022), 1,000 mg (9/6/2022), 1,000 mg (10/4/2022), 1,000 mg (11/1/2022), 1,000 mg (11/29/2022), 1,000 mg (1/24/2023), 1,000 mg (2/21/2023), 1,000 mg (3/21/2023), 1,000 mg (4/18/2023), 1,000 mg  "(5/16/2023), 1,000 mg (6/13/2023), 1,000 mg (7/11/2023), 1,000 mg (8/8/2023), 1,000 mg (9/5/2023), 1,000 mg (10/3/2023), 1,000 mg (10/31/2023), 1,000 mg (11/28/2023), 1,000 mg (1/23/2024), 1,000 mg (2/20/2024), 1,000 mg (3/19/2024)     4/24/2018 Initial Diagnosis    CLL (chronic lymphocytic leukemia) ([facility])         ROS:  04/12/24 Reviewed 13 systems: See symptoms in HPI  Presently no other neurological, cardiac, pulmonary, GI and  symptoms other than listed in HPI.  Other symptoms are in HPI.  No fever, chills, bleeding, bone pains, skin rash, weight loss,  numbness,  claudication and gait problem. No frequent infections but had them before.  Not unusually sensitive to heat or cold. No swelling of the ankles. No swollen glands.  Patient is anxious.  Has vascular purpura on the arms.      /70 (BP Location: Right arm, Patient Position: Sitting, Cuff Size: Adult)   Pulse 93   Temp (!) 97.4 °F (36.3 °C)   Resp 18   Ht 6' 0.99\" (1.854 m)   Wt 105 kg (232 lb)   SpO2 95%   BMI 30.62 kg/m²     Physical Exam:  Alert and oriented and not in distress.  Vitals are above.  No icterus.  No oral thrush.  No palpable neck mass.  Clear lung fields.  Regular heart rate.  Soft and nontender abdomen.  No palpable abdominal mass.  No ascites.  No edema of ankles.  No calf tenderness.  No   focal neurological deficit.  No skin rash other than vascular purpura both forearms and back of hands.  No palpable lymphadenopathy in the neck and axillary areas.    No clubbing.  Patient is anxious.  Performance status 1.           1 Follow-up Encounter            Component  Ref Range & Units 4/2/24  8:24 AM 3/26/24 10:29 AM 3/19/24  8:17 AM 2/20/24  8:45 AM 1/23/24  8:29 AM 11/28/23  8:26 AM 10/31/23  8:20 AM   WBC  4.31 - 10.16 Thousand/uL 6.61 6.11 4.86 8.02 5.34 5.30 5.72   RBC  3.88 - 5.62 Million/uL 4.69 4.68 4.79 4.73 4.68 4.62 4.41   Hemoglobin  12.0 - 17.0 g/dL 14.6 14.4 14.6 14.7 14.4 14.2 13.5   Hematocrit  36.5 - 49.3 % " 44.5 44.2 44.7 45.0 44.2 43.3 41.2   MCV  82 - 98 fL 95 94 93 95 94 94 93   MCH  26.8 - 34.3 pg 31.1 30.8 30.5 31.1 30.8 30.7 30.6   MCHC  31.4 - 37.4 g/dL 32.8 32.6 32.7 32.7 32.6 32.8 32.8   RDW  11.6 - 15.1 % 13.7 13.7 13.7 13.7 13.6 13.7 13.3   MPV  8.9 - 12.7 fL 10.5 11.0 11.0 11.5 11.7 11.6 11.7   Platelets  149 - 390 Thousands/uL 162 117 Low  186 214 178 161 149   nRBC     0 R     Absolute Lymphocytes     0.81 R     Absolute Monocytes     0.87 R     Eosinophils Absolute     0.33 R     Basophils Absolute     0.09 R     Segmented %     59 R     Immature Grans %     2 R     Lymphocytes %     15 R     Monocytes %     16 High  R     Eosinophils Relative     6 R     Basophils Relative     2 High  R     Absolute Neutrophils     3.12 R     Absolute Immature Grans     0.12 R                Specimen Collected: 04/02/24  8:24 AM Last Resulted: 04/02/24  2:08 PM                1 Follow-up Encounter            Component  Ref Range & Units 4/2/24  8:24 AM 3/26/24 10:29 AM 3/19/24  8:17 AM 2/20/24  8:45 AM 1/23/24  8:29 AM 11/28/23  8:26 AM 10/31/23  8:20 AM   Segmented %  43 - 75 % 59 68 58 47 59 57 61   Bands %  0 - 8 % 5   6  2 2   Lymphocytes %  14 - 44 % 14 18 12 Low  15 15 23 11 Low    Monocytes %  4 - 12 % 12 8 14 High  16 High  16 High  11 14 High    Eosinophils %  0 - 6 % 9 High  6 14 High  8 High  6 6 10 High    Basophils %  0 - 1 % 0 0 2 High  1 2 High  1 1   Atypical Lymphocytes %  <=0 % 1 High    6 High    1 High    Absolute Neutrophils  1.85 - 7.62 Thousand/uL 4.23 4.15 2.82 4.25 3.12 R 3.13 3.60   Absolute Lymphocytes  0.60 - 4.47 Thousand/uL 0.99 1.10 0.58 Low  1.68 0.81 R 1.22 0.69   Absolute Monocytes  0.00 - 1.22 Thousand/uL 0.79 0.49 0.68 1.28 High  0.87 R 0.58 0.80   Absolute Eosinophils  0.00 - 0.40 Thousand/uL 0.59 High  0.37 0.68 High  0.64 High  0.33 R 0.32 0.57 High    Absolute Basophils  0.00 - 0.10 Thousand/uL 0.00 0.00 0.10 0.08 0.09 R 0.05 0.06   Total Counted          RBC Morphology Normal  Normal Normal Normal  Normal Normal   Platelet Estimate  Adequate Adequate Borderline Abnormal  Adequate Adequate  Adequate Borderline Abnormal                      1 HM Topic            Component  Ref Range & Units 3/19/24  8:17 AM 2/20/24  8:45 AM 1/23/24  8:29 AM 11/28/23  8:26 AM 10/31/23  8:20 AM 10/3/23  8:28 AM 9/5/23  8:22 AM   Sodium  135 - 147 mmol/L 139 142 138 139 140 137 139   Potassium  3.5 - 5.3 mmol/L 4.1 3.8 4.0 3.9 3.6 3.9 3.7   Chloride  96 - 108 mmol/L 107 110 High  107 107 108 107 106   CO2  21 - 32 mmol/L 27 27 28 27 26 26 27   ANION GAP  4 - 13 mmol/L 5 5 R 3 R 5 R 6 R 4 R 6 R   BUN  5 - 25 mg/dL 17 17 18 21 21 19 17   Creatinine  0.60 - 1.30 mg/dL 1.09 1.14 CM 1.17 CM 1.09 CM 1.11 CM 0.98 CM 1.11 CM   Comment: Standardized to IDMS reference method   Glucose  65 - 140 mg/dL 161 High  52 Low   High   High   High   High   High  CM   Comment: If the patient is fasting, the ADA then defines impaired fasting glucose as > 100 mg/dL and diabetes as > or equal to 123 mg/dL.   Calcium  8.4 - 10.2 mg/dL 9.1 9.1 9.7 9.2 8.8 8.8 9.2   AST  13 - 39 U/L 40 High  31 30 33 30 27 36   ALT  7 - 52 U/L 53 High  39 CM 40 CM 47 CM 39 CM 41 CM 43 CM   Comment: Specimen collection should occur prior to Sulfasalazine administration due to the potential for falsely depressed results.   Alkaline Phosphatase  34 - 104 U/L 45 45 38 43 46 39 36   Total Protein  6.4 - 8.4 g/dL 6.3 Low  6.5 6.2 Low  6.0 Low  6.0 Low  6.0 Low  6.3 Low    Albumin  3.5 - 5.0 g/dL 4.2 4.2 4.1 4.1 4.0 4.0 4.2   Total Bilirubin  0.20 - 1.00 mg/dL 0.55 0.38 CM 0.45 CM 0.42 CM 0.40 CM 0.52 CM 0.63 CM   Comment: Use of this assay is not recommended for patients undergoing treatment with eltrombopag due to the potential for falsely elevated results.  N-acetyl-p-benzoquinone imine (metabolite of Acetaminophen) will generate erroneously low results in samples for patients that have taken an overdose of Acetaminophen.    eGFR  ml/min/1.73sq m 68 65 63 68 67 78 67                      important suggestion  Newer results are available. Click to view them now.               Component  Ref Range & Units 2/20/24  8:45 AM 1/23/24  8:29 AM 11/28/23  8:26 AM 10/31/23  8:20 AM 10/3/23  8:28 AM 9/5/23  8:22 AM 8/8/23  8:49 AM   IGG  635 - 1,741 mg/dL 523 Low  529 Low  513 Low  470 Low  499 Low  511 Low  516.0 Low  R                           .   Labs, Imaging, & Other studies:     All pertinent labs and imaging studies were personally reviewed      Reviewed test results and discussed with patient.    Assessment and plan:   Continuation of care for a longstanding history of CLL, diagnosed more than 20 years ago with complications along the way and presently patient's condition is stable  on 140 mg ibrutinib daily, Gazyva once a month and IVIG infusion once a month,  for stage IV CLL.  CLL condition has remained stable on this program for a long time.  Also he is on 5 mg prednisone daily and folic acid.  He is on acyclovir.  He is taking calcium and vitamin D.  He will go for bone density test.  CLL was diagnosed more than 20 years ago and he had multiple lines of therapies and at 1 time his hemoglobin was down to 4.9 because of  autoimmune hemolytic anemia and CLL.    CLL was diagnosed several years ago. He had been through Fludara based chemotherapy and pentostatin based chemotherapy. He had increased hemolysis when he was on chemotherapy. His most recent chemotherapy treatment was Cytoxan, Oncovin, prednisone and Rituxan and last treatment was in December 2012.. In 2013 he was started on Rituxan, prednisone and folic acid because he started to hemolyse again. IVIG was tried unsuccessfully so far as hemolysis is concerned. Rituxan has controlled the hemolysis. ...  Information on the treatments from March 2017 onwards.  On 3/20/17 patient found to have hemoglobin of 6.2, ,000. He was admitted to [facility] for blood  transfusion and plan to transfer to [facility].   On 3/20/17 WBC count 195,000, hemoglobin 4.9, platelet count 65. Direct coomb's was positive with undetectable haptoglobin. He was given 2 units of PRBCs, Solu-Medrol 1 g ×3 days and IVIG 1 g/kg daily ×3 days. His hemoglobin continued to drop. Bone marrow biopsy on 3/21 showed marrow cellularity of greater than 95% almost replaced by small lymphocytes, lambda light chain restricted, CD20 positive, CD19 positive, CD23 positive partially expressing CD11c and CD25 and CD5 positive and negative for CD 79 and CD38.  He was treated with single dose of chlorambucil to control his CLL.  3/22 second dose of chlorambucil, also Rituxan 375 mg/M ² autoimmune hemolytic anemia. WBC continued to rise, 266,000.  Patient was initiated with Venetoclax 20 mg daily. Tumor lysis monitored every 8 hours; given aggressive hydration and Lasix and remained euvolemic.  Later venetoclax was changed to ibrutinib because of disease progression  Dec 2017- Imbruvica decreased to 140 mg PO daily due to thrombocytopenia .  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 He was discharged on IV ampicillin. Echo negative for vegetations.   He is doing very well on present treatment plan so far as CLL is concerned  Patient had cataract surgeries in December 2023   He has tiredness, chronic nonproductive cough, exertional dyspnea, occasionally wheezing, arthritic symptoms and for that he is on oxycodone.  He has  vascular purpura on forearms.  He has chronic lung disease.  History of diabetes mellitus, hypertension and coronary artery disease.  .  For dysphagia he had EGD and dilatation in September 2020 and he follows with his gastroenterologist.   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    He has coronary artery disease and has 1 stent and he takes 81 mg aspirin daily with food.     . . He is taking  vitamin-D and calcium and tries to stay active to prevent worsening of 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.      Physical examination and test results are as recorded and discussed in  detail.    Hematologically stable.  No change in therapy.  All discussed in detail.  Questions answered.    Discussed the importance of eating healthy foods, staying active and health screening tests.  Patient to avoid trauma and falls. Goal is prolongation of survival and prevention of complications.  Patient is capable of self-care.  Discussed precautions against coronavirus and other infections.  Patient is immunocompromised .   .  See diagnoses, orders and instructions    1. CLL (chronic lymphocytic leukemia) ([facility])    - predniSONE 5 mg tablet; Take 1 tablet (5 mg total) by mouth daily  Dispense: 90 tablet; Refill: 0  - CBC and differential; Standing  - Comprehensive metabolic panel; Standing  - IgG; Standing    2. Cancer related pain      3. Autoimmune hemolytic anemia ([facility])      4. Hypogammaglobulinemia, acquired ([facility])      5. 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[facility])      6. Thrombocytopenia ([facility])      7. Chronic obstructive pulmonary disease, unspecified COPD type ([facility])      8. Type 2 diabetes mellitus with diabetic polyneuropathy, with long-term current use of insulin ([facility])      9. Coronary artery disease involving native coronary artery of native heart without angina pectoris      10. Primary hypertension      11. Stage 3 chronic kidney disease, unspecified whether stage 3a or 3b CKD ([facility])      12. Low serum IgG for age    - IgG; Standing    13. Drug-induced osteoporosis    - DXA bone density spine hip and pelvis; Future    Patient has standing orders for blood work, IVIG and Gazyva.  Renewed prednisone prescription.  Follow-up in 3 months.  Ordered DEXA scan                         Patient will continue to follow with his primary physician and other consultants      I used dictation device to dictate this note  and there could be mistakes in my note and for that he may call my office      Patient voiced understanding and agreed      Counseling / Coordination of Care  .  Provided counseling and support

## 2024-04-10 NOTE — PATIENT INSTRUCTIONS
Patient has standing orders for blood work, IVIG and Gazyva.  Renewed prednisone prescription.  Follow-up in 3 months.  Ordered DEXA scan

## 2024-04-15 ENCOUNTER — OFFICE VISIT (OUTPATIENT)
Dept: ENDOCRINOLOGY | Facility: CLINIC | Age: 69
End: 2024-04-15
Payer: MEDICARE

## 2024-04-15 VITALS
WEIGHT: 232.6 LBS | OXYGEN SATURATION: 98 % | SYSTOLIC BLOOD PRESSURE: 122 MMHG | HEIGHT: 72 IN | HEART RATE: 65 BPM | BODY MASS INDEX: 31.51 KG/M2 | DIASTOLIC BLOOD PRESSURE: 80 MMHG

## 2024-04-15 DIAGNOSIS — Z79.4 TYPE 2 DIABETES MELLITUS WITH HYPERGLYCEMIA, WITH LONG-TERM CURRENT USE OF INSULIN (HCC): Primary | ICD-10-CM

## 2024-04-15 DIAGNOSIS — E78.2 MIXED HYPERLIPIDEMIA: ICD-10-CM

## 2024-04-15 DIAGNOSIS — E11.65 TYPE 2 DIABETES MELLITUS WITH HYPERGLYCEMIA, WITH LONG-TERM CURRENT USE OF INSULIN (HCC): Primary | ICD-10-CM

## 2024-04-15 DIAGNOSIS — I10 PRIMARY HYPERTENSION: ICD-10-CM

## 2024-04-15 PROCEDURE — 99214 OFFICE O/P EST MOD 30 MIN: CPT | Performed by: PHYSICIAN ASSISTANT

## 2024-04-15 PROCEDURE — 95251 CONT GLUC MNTR ANALYSIS I&R: CPT | Performed by: PHYSICIAN ASSISTANT

## 2024-04-15 RX ORDER — DIABETIC SUPPLIES,MISCELL
MISCELLANEOUS MISCELLANEOUS
Qty: 1 EACH | Refills: 1 | Status: SHIPPED | OUTPATIENT
Start: 2024-04-15

## 2024-04-15 RX ORDER — BOLUS INSULIN PUMP, 200 UNIT 2 UNIT
EACH MISCELLANEOUS
Qty: 1 EACH | Refills: 3 | Status: SHIPPED | OUTPATIENT
Start: 2024-04-15

## 2024-04-15 NOTE — PROGRESS NOTES
Established Patient Progress Note    Chief Complaint:  Diabetes follow up visit    Impression & Plan:    Problem List Items Addressed This Visit        Cardiovascular and Mediastinum    Hypertension       Endocrine    Type 2 diabetes mellitus with hyperglycemia, with long-term current use of insulin ([facility]) - Primary     Diabetes control is stable  He will get labs tomorrow  He is sometimes forgetting insulin when out of the house-- Discussed option of Cequr simplicity device (wearable insulin patch) and he would be intersted.  Will send RX to pharmacy.  Advised him to let me know if he is able to get this covered-- if So I will order Admelog Vials and refer for Diabetes education.   Lab Results   Component Value Date    HGBA1C 6.5 01/04/2024          Relevant Medications    Injection Device for Insulin (CeQur Simplicity 2U) JOE    Injection Device for Insulin (CeQur Simplicity ) MISC       Other    Hyperlipidemia     Continue fenofibrate. He has not tolerated statins.             History of Present Illness:   Cayetano Lucero is a 68 y.o. male with a history of type 2 diabetes  since 2017. Reports complications of none.      Getting chemo tomorrow-- includes steroid infusion (every other tueday). He will adjust his insulin due to highs day of infusion and 1-2 days after.  Most of the time he tries to take insulin before he eats but often out of the house and does not always have insulin.     Going for labs tomorrow  Seeing podiatry regularly   UTD on eye exam- will be having cataract surgery ([facility])      CGM Interpretation  Cayetano Lucero   Device used Freestyle merary for Personal Use  Indication: Type of Diabetes: Type 2 Diabetes  More than 72 hours of data was reviewed. Report to be scanned to chart.   Date Range: 4/2/2024-4/15/2024  Analysis of data:   Average Glucose: 168 mg/dl  Coefficient of Variation: 29.9%  Time in Target Range: 63%  Time Above Range: 36%  Time Below Range: 1%    Interpretation of data:   Blood sugars high  4/2 and 4/3 following steroid infusion  Remainder of times, blood sugars are frequently in target range with some variability and post meal hyperglycemia.      Current regimen:   Tresiba 18 daily at bedtime  Admelog 12-14 before meals  Will take 18 on morning of chemo infusion.     Has hypertension: Taking losartan  Has hyperlipidemia: Taking fenofibrate  Hx statin intolerance     Patient Active Problem List   Diagnosis   • CAD (coronary artery disease)   • CLL (chronic lymphocytic leukemia) (HCC)   • Hyperlipidemia   • Hypertension   • History of TIA (transient ischemic attack)   • Esophagitis, reflux   • Candida esophagitis (HCC)   • Stage 3 chronic kidney disease, unspecified whether stage 3a or 3b CKD (HCC)   • H/O blood clots   • Hemolytic anemia (HCC)   • 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[facility])   • Anemia, chronic disease   • Chronic lymphocytic leukemia (HCC)   • Leukopenia due to antineoplastic chemotherapy (HCC)   • Hyperglycemia   • Cellulitis of head or scalp   • Pancytopenia (HCC)   • Headache around the eyes   • Gastroesophageal reflux disease without esophagitis   • Colitis, acute   • Listeria bacteremia   • Thrombocytopenia (HCC)   • Type 2 diabetes mellitus with hyperglycemia, with long-term current use of insulin ([facility])   • Chronic right shoulder pain   • 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HCC)   • Ulcer of esophagus without bleeding   • Esophageal stricture   • Dysphagia   • Esophageal dysphagia   • Acute bursitis of right shoulder   • Hypogammaglobulinemia, acquired (HCC)   • Autoimmune hemolytic anemia (HCC)   • Right upper quadrant abdominal pain   • Chronic obstructive pulmonary disease (HCC)   • Depression, recurrent (HCC)   • Long term (current) use of systemic steroids   • Cancer related pain   • Preop cardiovascular exam   • Type 2 diabetes mellitus with diabetic polyneuropathy, with long-term current use of insulin ([facility])   • Port-A-Cath in place    • Low serum IgG for age   • Drug-induced osteoporosis      Past Medical History:   Diagnosis Date   • Allergic    • Anemia    • Arthritis    • CAD (coronary artery disease) 2004   • Cancer (HCC)     Leukemia   • Cellulitis of scalp    • CKD (chronic kidney disease) stage 3, GFR 30-59 ml/min (HCC)    • CLL (chronic lymphocytic leukemia) (HCC)    • COPD (chronic obstructive pulmonary disease) (HCC)    • Diabetes mellitus (HCC)     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   • Dyslipidemia    • Edema extremities    • Esophageal ulcer    • H/O blood clots    • Hemolytic anemia (HCC)    • Hiatal hernia    • History of TIA (transient ischemic attack)    • Hyperlipidemia    • Hypertension    • Listeria infection 2018   • Multiple gastric ulcers    • Myocardial infarction (HCC) 2004    acute   • Neutropenia (HCC)    • Obese    • Recurrent sinusitis    • Thrombocytopenia (HCC)    • Vertigo       Past Surgical History:   Procedure Laterality Date   • ARTHROSCOPIC REPAIR ACL      lt knee   • CARDIAC SURGERY      cardiac cath with stent   • FOOT SURGERY     • IR PORT CHECK  5/17/2019   • KNEE ARTHROSCOPY     • OTHER SURGICAL HISTORY      cardiac cath lesion 1, 1st adjunct treat device: stent   • PORTACATH PLACEMENT     • UT ESOPHAGOGASTRODUODENOSCOPY TRANSORAL DIAGNOSTIC N/A 10/5/2017    Procedure: ESOPHAGOGASTRODUODENOSCOPY (EGD) with bx;  Surgeon: Winifred Hansen DO;  Location: [facility];  Service: Gastroenterology   • UT ESOPHAGOGASTRODUODENOSCOPY TRANSORAL DIAGNOSTIC N/A 3/8/2018    Procedure: ESOPHAGOGASTRODUODENOSCOPY (EGD) with biopsy;  Surgeon: Winifred Hansen DO;  Location: [facility];  Service: Gastroenterology   • UT ESOPHAGOGASTRODUODENOSCOPY TRANSORAL DIAGNOSTIC N/A 6/20/2018    Procedure: ESOPHAGOGASTRODUODENOSCOPY (EGD) with Dilation;  Surgeon: Winifred Hansen DO;  Location: [facility];  Service: Gastroenterology   • UT ESOPHAGOGASTRODUODENOSCOPY TRANSORAL DIAGNOSTIC N/A 10/18/2018    Procedure: ESOPHAGOGASTRODUODENOSCOPY (EGD)  with dilation;  Surgeon: Edu Mejía MD;  Location: [facility];  Service: Gastroenterology   • CO ESOPHAGOSCOPY FLEXIBLE TRANSORAL WITH BIOPSY N/A 2016    Procedure: ESOPHAGOGASTRODUODENOSCOPY (EGD); ESOPHAGEAL DILATION; ESOPHAGEAL BIOPSY;  Surgeon: Abdi Holcomb MD;  Location: [facility];  Service: Thoracic   • TONSILLECTOMY     • UPPER GASTROINTESTINAL ENDOSCOPY      x 6      Family History   Problem Relation Age of Onset   • Leukemia Mother         chronic   • Colon polyps Mother         sidmoid   • Parkinsonism Father      Social History     Tobacco Use   • Smoking status: Former     Current packs/day: 0.00     Average packs/day: 2.0 packs/day for 33.0 years (66.0 ttl pk-yrs)     Types: Cigarettes     Start date:      Quit date:      Years since quittin.2     Passive exposure: Past   • Smokeless tobacco: Never   Substance Use Topics   • Alcohol use: Yes     Alcohol/week: 2.0 standard drinks of alcohol     Types: 2 Cans of beer per week     Comment: social use as per [employer]     Allergies   Allergen Reactions   • Heparin Other (See Comments)     Other reaction(s): Blood Disorders  clot   • Macrolides And Ketolides Other (See Comments)     Interacts with other meds he is taking   • Simvastatin Myalgia         Current Outpatient Medications:   •  acyclovir (ZOVIRAX) 400 MG tablet, TAKE 1 TABLET TWICE A DAY, Disp: 60 tablet, Rfl: 11  •  Admelog SoloStar 100 units/mL injection pen, Inject 12-14 units before each meal, Disp: 15 mL, Rfl: 0  •  albuterol (2.5 mg/3 mL) 0.083 % nebulizer solution, Take 3 mL (2.5 mg total) by nebulization every 6 (six) hours as needed for wheezing or shortness of breath, Disp: 180 mL, Rfl: 5  •  amLODIPine (NORVASC) 10 mg tablet, TAKE 1 TABLET DAILY, Disp: 90 tablet, Rfl: 5  •  aspirin 81 MG tablet, Take 81 mg by mouth daily Every other day, Disp: , Rfl:   •  benzonatate (TESSALON) 200 MG capsule, TAKE 1 CAPSULE THREE TIMES A DAY AS NEEDED FOR COUGH, Disp: 20  capsule, Rfl: 51  •  Blood Glucose Monitoring Suppl (FreeStyle Freedom Lite) w/Device KIT, Use 3 (three) times a day, Disp: 1 kit, Rfl: 0  •  citalopram (CeleXA) 40 mg tablet, TAKE 1 TABLET DAILY, Disp: 90 tablet, Rfl: 1  •  Continuous Blood Gluc  (FreeStyle Ashley 2 Ranger) JOE, Use to scan sensor premeals and at bedtime, Disp: 1 each, Rfl: 1  •  Continuous Blood Gluc Sensor (FreeStyle Ashley 2 Sensor) MISC, Apply 1 sensor every 14 days. Use as directed with Freestyle Ashley 2 reader., Disp: 2 each, Rfl: 2  •  Diclofenac Sodium (VOLTAREN) 1 %, Apply 4 g topically 4 (four) times a day, Disp: 100 g, Rfl: 0  •  fenofibrate (TRICOR) 145 mg tablet, TAKE 1 TABLET DAILY, Disp: 90 tablet, Rfl: 5  •  folic acid (FOLVITE) 1 mg tablet, Take 800 mcg by mouth daily , Disp: , Rfl:   •  gabapentin (NEURONTIN) 600 MG tablet, TAKE 1 TABLET DAILY, Disp: 90 tablet, Rfl: 5  •  glucose blood (FREESTYLE LITE) test strip, Check blood sugar 3 times a day, Disp: 300 strip, Rfl: 1  •  Ibrutinib 140 MG TABS, Take 140 mg by mouth daily, Disp: 28 tablet, Rfl: 5  •  Injection Device for Insulin (CeQur Simplicity 2U) JOE, Use 1 patch every 3 days, disp 1 box of 10 patches for 30 days supply, Disp: 1 each, Rfl: 3  •  Injection Device for Insulin (CeQur Simplicity ) MISC, Use to insert simplicity device., Disp: 1 each, Rfl: 1  •  insulin degludec (Tresiba FlexTouch) 100 units/mL injection pen, Inject 18 Units under the skin daily at bedtime, Disp: 15 mL, Rfl: 3  •  Insulin Pen Needle (BD Pen Needle Inez U/F) 32G X 4 MM MISC, Use 3 (three) times a day, Disp: 300 each, Rfl: 0  •  Lancets (FREESTYLE) lancets, Use as instructed--test 2 times a day  E 11.9, Disp: 100 each, Rfl: 0  •  Lancets (freestyle) lancets, TEST BLOOD SUGAR 3 TIMES A DAY, Disp: 300 each, Rfl: 1  •  LORazepam (ATIVAN) 0.5 mg tablet, Take 1 tablet (0.5 mg total) by mouth daily as needed for anxiety, Disp: 30 tablet, Rfl: 0  •  losartan (COZAAR) 100 MG tablet, TAKE 1  TABLET DAILY, Disp: 90 tablet, Rfl: 1  •  mometasone-formoterol (Dulera) 200-5 MCG/ACT inhaler, Inhale 2 puffs 2 (two) times a day Rinse mouth after use., Disp: 13 g, Rfl: 1  •  multivitamin (THERAGRAN) TABS, Take 1 tablet by mouth daily, Disp: , Rfl:   •  obinutuzumab (GAZYVA) 1000 mg/40 mL SOLN, Infuse into a venous catheter, Disp: , Rfl:   •  oxyCODONE (ROXICODONE) 10 MG TABS, Take 1 tablet (10 mg total) by mouth every 6 (six) hours as needed for moderate pain For ongoing pain control Max Daily Amount: 40 mg, Disp: 28 tablet, Rfl: 0  •  pantoprazole (PROTONIX) 40 mg tablet, Take 1 tablet (40 mg total) by mouth daily, Disp: 90 tablet, Rfl: 1  •  predniSONE 5 mg tablet, Take 1 tablet (5 mg total) by mouth daily, Disp: 90 tablet, Rfl: 0  •  Ventolin  (90 Base) MCG/ACT inhaler, USE 2 INHALATIONS EVERY 6 HOURS AS NEEDED FOR WHEEZING, Disp: 54 g, Rfl: 5  •  zolpidem (AMBIEN) 5 mg tablet, Take 1 tablet (5 mg total) by mouth daily at bedtime as needed for sleep, Disp: 30 tablet, Rfl: 0  •  bisacodyl (DULCOLAX) 5 mg EC tablet, Take as directed as per written office instructions (Patient not taking: Reported on 1/4/2024), Disp: 2 tablet, Rfl: 0  •  losartan (COZAAR) 100 MG tablet, Take 1 tablet (100 mg total) by mouth daily (Patient not taking: Reported on 1/4/2024), Disp: 10 tablet, Rfl: 0  •  naloxone (NARCAN) 4 mg/0.1 mL nasal spray, Administer 1 spray into a nostril. If no response after 2-3 minutes, give another dose in the other nostril using a new spray. (Patient not taking: Reported on 4/4/2024), Disp: 1 each, Rfl: 1  •  naloxone (NARCAN) 4 mg/0.1 mL nasal spray, Administer 1 spray into a nostril. If no response after 2-3 minutes, give another dose in the other nostril using a new spray. (Patient not taking: Reported on 8/23/2023), Disp: 1 each, Rfl: 1  •  polyethylene glycol (GOLYTELY) 4000 mL solution, Take 4,000 mL by mouth once for 1 dose (Patient not taking: Reported on 4/4/2024), Disp: 4000 mL, Rfl:  0  •  sodium chloride, PF, 0.9 %, 10 mL by Intracatheter route see administration instructions 10mL into port before and after ampicillin doses (Patient not taking: Reported on 5/4/2023), Disp: , Rfl: 0    Review of Systems   Constitutional:  Negative for activity change, appetite change and fatigue.   HENT:  Negative for sore throat, trouble swallowing and voice change.    Eyes:  Negative for visual disturbance.   Respiratory:  Negative for choking, chest tightness and shortness of breath.    Cardiovascular:  Negative for chest pain, palpitations and leg swelling.   Gastrointestinal:  Negative for abdominal pain, constipation and diarrhea.   Endocrine: Negative for cold intolerance, heat intolerance, polydipsia, polyphagia and polyuria.   Genitourinary:  Negative for frequency.   Musculoskeletal:  Negative for arthralgias and myalgias.   Skin:  Negative for rash.   Neurological:  Negative for dizziness and syncope.   Hematological:  Negative for adenopathy.   Psychiatric/Behavioral:  Negative for sleep disturbance.    All other systems reviewed and are negative.      Physical Exam:  Body mass index is 31.55 kg/m².  /80   Pulse 65   Ht 6' (1.829 m)   Wt 106 kg (232 lb 9.6 oz)   SpO2 98%   BMI 31.55 kg/m²    Wt Readings from Last 3 Encounters:   04/15/24 106 kg (232 lb 9.6 oz)   04/10/24 105 kg (232 lb)   04/04/24 105 kg (231 lb 9.6 oz)       Physical Exam  Vitals reviewed.   Constitutional:       General: He is not in acute distress.     Appearance: He is well-developed.   HENT:      Head: Normocephalic and atraumatic.   Eyes:      Conjunctiva/sclera: Conjunctivae normal.      Pupils: Pupils are equal, round, and reactive to light.   Neck:      Thyroid: No thyromegaly.   Cardiovascular:      Rate and Rhythm: Normal rate and regular rhythm.      Heart sounds: Normal heart sounds. No murmur heard.  Pulmonary:      Effort: Pulmonary effort is normal. No respiratory distress.      Breath sounds: Normal breath  sounds. No wheezing or rales.   Abdominal:      General: Bowel sounds are normal. There is no distension.      Palpations: Abdomen is soft.      Tenderness: There is no abdominal tenderness.   Musculoskeletal:         General: Normal range of motion.      Cervical back: Normal range of motion and neck supple.   Lymphadenopathy:      Cervical: No cervical adenopathy.   Skin:     General: Skin is warm and dry.   Neurological:      Mental Status: He is alert and oriented to person, place, and time.           Labs:   Lab Results   Component Value Date    HGBA1C 6.5 01/04/2024    HGBA1C 6.7 (A) 08/23/2023    HGBA1C 6.6 (A) 05/04/2023     Lab Results   Component Value Date    CREATININE 1.09 03/19/2024    CREATININE 1.14 02/20/2024    CREATININE 1.17 01/23/2024    BUN 17 03/19/2024     12/29/2015    K 4.1 03/19/2024     03/19/2024    CO2 27 03/19/2024     eGFR   Date Value Ref Range Status   03/19/2024 68 ml/min/1.73sq m Final     Lab Results   Component Value Date    CHOL 122 02/11/2014    HDL 38 (L) 01/10/2023    TRIG 156 (H) 01/10/2023     Lab Results   Component Value Date    ALT 53 (H) 03/19/2024    AST 40 (H) 03/19/2024    ALKPHOS 45 03/19/2024    BILITOT 0.7 12/29/2015     Lab Results   Component Value Date    CMN4MCMPLAKD 2.121 08/08/2023    JQH7MQBIKGQC 0.882 01/10/2023    OAN2ZBSRYEAF 0.984 05/03/2022     Lab Results   Component Value Date    FREET4 0.74 08/08/2023       Discussed with the patient and all questioned fully answered. He will call me if any problems arise.    Follow-up appointment in 4 months.     Counseled patient on diagnostic results, prognosis, risk and benefit of treatment options, instruction for management, importance of treatment compliance, Risk  factor reduction and impressions    Catalina Randolph PA-C

## 2024-04-15 NOTE — ASSESSMENT & PLAN NOTE
Diabetes control is stable  He will get labs tomorrow  He is sometimes forgetting insulin when out of the house-- Discussed option of Cequr simplicity device (wearable insulin patch) and he would be intersted.  Will send RX to pharmacy.  Advised him to let me know if he is able to get this covered-- if So I will order Admelog Vials and refer for Diabetes education.   Lab Results   Component Value Date    HGBA1C 6.5 01/04/2024

## 2024-04-16 ENCOUNTER — HOSPITAL ENCOUNTER (OUTPATIENT)
Dept: INFUSION CENTER | Facility: CLINIC | Age: 69
Discharge: HOME | End: 2024-04-16
Payer: MEDICARE

## 2024-04-16 VITALS
TEMPERATURE: 97 F | BODY MASS INDEX: 31.69 KG/M2 | HEART RATE: 68 BPM | WEIGHT: 233.69 LBS | SYSTOLIC BLOOD PRESSURE: 160 MMHG | RESPIRATION RATE: 18 BRPM | DIASTOLIC BLOOD PRESSURE: 80 MMHG

## 2024-04-16 DIAGNOSIS — D80.1 HYPOGAMMAGLOBULINEMIA, ACQUIRED (HCC): ICD-10-CM

## 2024-04-16 DIAGNOSIS — I10 PRIMARY HYPERTENSION: ICD-10-CM

## 2024-04-16 DIAGNOSIS — C91.10 CHRONIC LYMPHATIC LEUKEMIA (HCC): ICD-10-CM

## 2024-04-16 DIAGNOSIS — E11.65 TYPE 2 DIABETES MELLITUS WITH HYPERGLYCEMIA, WITH LONG-TERM CURRENT USE OF INSULIN (HCC): ICD-10-CM

## 2024-04-16 DIAGNOSIS — Z79.4 TYPE 2 DIABETES MELLITUS WITH HYPERGLYCEMIA, WITH LONG-TERM CURRENT USE OF INSULIN (HCC): ICD-10-CM

## 2024-04-16 DIAGNOSIS — C91.10 CLL (CHRONIC LYMPHOCYTIC LEUKEMIA) (HCC): Primary | ICD-10-CM

## 2024-04-16 DIAGNOSIS — E78.2 MIXED HYPERLIPIDEMIA: ICD-10-CM

## 2024-04-16 DIAGNOSIS — D59.10 AUTOIMMUNE HEMOLYTIC ANEMIA (HCC): ICD-10-CM

## 2024-04-16 LAB
ALBUMIN SERPL BCP-MCNC: 4.2 G/DL (ref 3.5–5)
ALBUMIN SERPL BCP-MCNC: 4.3 G/DL (ref 3.5–5)
ALP SERPL-CCNC: 37 U/L (ref 34–104)
ALP SERPL-CCNC: 38 U/L (ref 34–104)
ALT SERPL W P-5'-P-CCNC: 44 U/L (ref 7–52)
ALT SERPL W P-5'-P-CCNC: 45 U/L (ref 7–52)
ANION GAP SERPL CALCULATED.3IONS-SCNC: 5 MMOL/L (ref 4–13)
ANION GAP SERPL CALCULATED.3IONS-SCNC: 5 MMOL/L (ref 4–13)
AST SERPL W P-5'-P-CCNC: 37 U/L (ref 13–39)
AST SERPL W P-5'-P-CCNC: 37 U/L (ref 13–39)
BASOPHILS # BLD MANUAL: 0 THOUSAND/UL (ref 0–0.1)
BASOPHILS NFR MAR MANUAL: 0 % (ref 0–1)
BILIRUB SERPL-MCNC: 0.6 MG/DL (ref 0.2–1)
BILIRUB SERPL-MCNC: 0.62 MG/DL (ref 0.2–1)
BUN SERPL-MCNC: 20 MG/DL (ref 5–25)
BUN SERPL-MCNC: 20 MG/DL (ref 5–25)
CALCIUM SERPL-MCNC: 9.5 MG/DL (ref 8.4–10.2)
CALCIUM SERPL-MCNC: 9.5 MG/DL (ref 8.4–10.2)
CHLORIDE SERPL-SCNC: 106 MMOL/L (ref 96–108)
CHLORIDE SERPL-SCNC: 107 MMOL/L (ref 96–108)
CHOLEST SERPL-MCNC: 183 MG/DL
CO2 SERPL-SCNC: 28 MMOL/L (ref 21–32)
CO2 SERPL-SCNC: 28 MMOL/L (ref 21–32)
CREAT SERPL-MCNC: 1.03 MG/DL (ref 0.6–1.3)
CREAT SERPL-MCNC: 1.03 MG/DL (ref 0.6–1.3)
CREAT UR-MCNC: 139.6 MG/DL
EOSINOPHIL # BLD MANUAL: 0.12 THOUSAND/UL (ref 0–0.4)
EOSINOPHIL NFR BLD MANUAL: 2 % (ref 0–6)
ERYTHROCYTE [DISTWIDTH] IN BLOOD BY AUTOMATED COUNT: 13.5 % (ref 11.6–15.1)
EST. AVERAGE GLUCOSE BLD GHB EST-MCNC: 148 MG/DL
GFR SERPL CREATININE-BSD FRML MDRD: 73 ML/MIN/1.73SQ M
GFR SERPL CREATININE-BSD FRML MDRD: 73 ML/MIN/1.73SQ M
GLUCOSE P FAST SERPL-MCNC: 133 MG/DL (ref 65–99)
GLUCOSE SERPL-MCNC: 131 MG/DL (ref 65–140)
GLUCOSE SERPL-MCNC: 133 MG/DL (ref 65–140)
HBA1C MFR BLD: 6.8 %
HCT VFR BLD AUTO: 44.5 % (ref 36.5–49.3)
HDLC SERPL-MCNC: 33 MG/DL
HGB BLD-MCNC: 14.4 G/DL (ref 12–17)
IGG SERPL-MCNC: 505 MG/DL (ref 635–1741)
LDLC SERPL CALC-MCNC: 131 MG/DL (ref 0–100)
LYMPHOCYTES # BLD AUTO: 0.58 THOUSAND/UL (ref 0.6–4.47)
LYMPHOCYTES # BLD AUTO: 10 % (ref 14–44)
MCH RBC QN AUTO: 30.2 PG (ref 26.8–34.3)
MCHC RBC AUTO-ENTMCNC: 32.4 G/DL (ref 31.4–37.4)
MCV RBC AUTO: 93 FL (ref 82–98)
METAMYELOCYTES NFR BLD MANUAL: 1 % (ref 0–1)
MICROALBUMIN UR-MCNC: 16 MG/L
MICROALBUMIN/CREAT 24H UR: 11 MG/G CREATININE (ref 0–30)
MONOCYTES # BLD AUTO: 0.58 THOUSAND/UL (ref 0–1.22)
MONOCYTES NFR BLD: 10 % (ref 4–12)
NEUTROPHILS # BLD MANUAL: 4.44 THOUSAND/UL (ref 1.85–7.62)
NEUTS BAND NFR BLD MANUAL: 1 % (ref 0–8)
NEUTS SEG NFR BLD AUTO: 76 % (ref 43–75)
NRBC BLD AUTO-RTO: 1 /100 WBC (ref 0–2)
PLATELET # BLD AUTO: 169 THOUSANDS/UL (ref 149–390)
PLATELET BLD QL SMEAR: ADEQUATE
PMV BLD AUTO: 11.5 FL (ref 8.9–12.7)
POTASSIUM SERPL-SCNC: 3.9 MMOL/L (ref 3.5–5.3)
POTASSIUM SERPL-SCNC: 4.1 MMOL/L (ref 3.5–5.3)
PROT SERPL-MCNC: 6.3 G/DL (ref 6.4–8.4)
PROT SERPL-MCNC: 6.3 G/DL (ref 6.4–8.4)
RBC # BLD AUTO: 4.77 MILLION/UL (ref 3.88–5.62)
RBC MORPH BLD: NORMAL
RETICS # AUTO: ABNORMAL 10*3/UL (ref 14356–105094)
RETICS # CALC: 1.96 % (ref 0.37–1.87)
SODIUM SERPL-SCNC: 139 MMOL/L (ref 135–147)
SODIUM SERPL-SCNC: 140 MMOL/L (ref 135–147)
TRIGL SERPL-MCNC: 94 MG/DL
WBC # BLD AUTO: 5.77 THOUSAND/UL (ref 4.31–10.16)

## 2024-04-16 PROCEDURE — 82570 ASSAY OF URINE CREATININE: CPT

## 2024-04-16 PROCEDURE — 96413 CHEMO IV INFUSION 1 HR: CPT

## 2024-04-16 PROCEDURE — 96415 CHEMO IV INFUSION ADDL HR: CPT

## 2024-04-16 PROCEDURE — 83036 HEMOGLOBIN GLYCOSYLATED A1C: CPT

## 2024-04-16 PROCEDURE — 82784 ASSAY IGA/IGD/IGG/IGM EACH: CPT

## 2024-04-16 PROCEDURE — 80053 COMPREHEN METABOLIC PANEL: CPT

## 2024-04-16 PROCEDURE — 80061 LIPID PANEL: CPT

## 2024-04-16 PROCEDURE — 80053 COMPREHEN METABOLIC PANEL: CPT | Performed by: INTERNAL MEDICINE

## 2024-04-16 PROCEDURE — 96367 TX/PROPH/DG ADDL SEQ IV INF: CPT

## 2024-04-16 PROCEDURE — 85007 BL SMEAR W/DIFF WBC COUNT: CPT | Performed by: PHYSICIAN ASSISTANT

## 2024-04-16 PROCEDURE — 85045 AUTOMATED RETICULOCYTE COUNT: CPT | Performed by: INTERNAL MEDICINE

## 2024-04-16 PROCEDURE — 85027 COMPLETE CBC AUTOMATED: CPT | Performed by: PHYSICIAN ASSISTANT

## 2024-04-16 PROCEDURE — 82043 UR ALBUMIN QUANTITATIVE: CPT

## 2024-04-16 RX ORDER — ACETAMINOPHEN 325 MG/1
650 TABLET ORAL ONCE
Status: COMPLETED | OUTPATIENT
Start: 2024-04-16 | End: 2024-04-16

## 2024-04-16 RX ORDER — SODIUM CHLORIDE 9 MG/ML
20 INJECTION, SOLUTION INTRAVENOUS ONCE
Status: COMPLETED | OUTPATIENT
Start: 2024-04-16 | End: 2024-04-16

## 2024-04-16 RX ADMIN — SODIUM CHLORIDE 20 ML/HR: 0.9 INJECTION, SOLUTION INTRAVENOUS at 08:45

## 2024-04-16 RX ADMIN — DIPHENHYDRAMINE HYDROCHLORIDE 25 MG: 50 INJECTION, SOLUTION INTRAMUSCULAR; INTRAVENOUS at 09:08

## 2024-04-16 RX ADMIN — OBINUTUZUMAB 1000 MG: 1000 INJECTION, SOLUTION, CONCENTRATE INTRAVENOUS at 10:16

## 2024-04-16 RX ADMIN — DEXAMETHASONE SODIUM PHOSPHATE 20 MG: 100 INJECTION INTRAMUSCULAR; INTRAVENOUS at 08:45

## 2024-04-16 RX ADMIN — ACETAMINOPHEN 650 MG: 325 TABLET, FILM COATED ORAL at 08:51

## 2024-04-16 NOTE — PROGRESS NOTES
Pt offers no new complaints today, central labs drawn per orders, ok to proceed without results, tolerated gazyva today without complications. Confirmed next appt 4-30-24 8:00 for IGG, AVS declined

## 2024-04-17 ENCOUNTER — TELEPHONE (OUTPATIENT)
Age: 69
End: 2024-04-17

## 2024-04-17 NOTE — RESULT ENCOUNTER NOTE
Please call the patient regarding labs - A1C 6.8 at goal-cholesterol is high but other than that labs look good

## 2024-04-18 ENCOUNTER — TELEPHONE (OUTPATIENT)
Age: 69
End: 2024-04-18

## 2024-04-18 NOTE — TELEPHONE ENCOUNTER
Patient called to state he received his diabetes supply, and was told to call the office to schedule an appointment to be shown how to use the material, and be educated on it. Please call patient to schedule an appointment with the diabetes educator.

## 2024-04-19 ENCOUNTER — TELEPHONE (OUTPATIENT)
Dept: DIABETES SERVICES | Facility: CLINIC | Age: 69
End: 2024-04-19

## 2024-04-19 ENCOUNTER — TELEPHONE (OUTPATIENT)
Age: 69
End: 2024-04-19

## 2024-04-19 NOTE — TELEPHONE ENCOUNTER
Patient is getting a new insulin device and needs the insulin vial of admelog sent to the pharmacy so he can get his training next week with the device. [facility].

## 2024-04-22 DIAGNOSIS — Z79.4 TYPE 2 DIABETES MELLITUS WITH HYPERGLYCEMIA, WITH LONG-TERM CURRENT USE OF INSULIN (HCC): Primary | ICD-10-CM

## 2024-04-22 DIAGNOSIS — E11.65 TYPE 2 DIABETES MELLITUS WITH HYPERGLYCEMIA, WITH LONG-TERM CURRENT USE OF INSULIN (HCC): Primary | ICD-10-CM

## 2024-04-22 RX ORDER — INSULIN LISPRO 100 [IU]/ML
INJECTION, SOLUTION INTRAVENOUS; SUBCUTANEOUS
Qty: 20 ML | Refills: 3 | Status: SHIPPED | OUTPATIENT
Start: 2024-04-22

## 2024-04-24 ENCOUNTER — ANESTHESIA (OUTPATIENT)
Dept: GASTROENTEROLOGY | Facility: HOSPITAL | Age: 69
End: 2024-04-24

## 2024-04-24 ENCOUNTER — TELEPHONE (OUTPATIENT)
Dept: GASTROENTEROLOGY | Facility: CLINIC | Age: 69
End: 2024-04-24

## 2024-04-24 ENCOUNTER — HOSPITAL ENCOUNTER (OUTPATIENT)
Dept: GASTROENTEROLOGY | Facility: HOSPITAL | Age: 69
Setting detail: OUTPATIENT SURGERY
Discharge: HOME | End: 2024-04-24
Attending: INTERNAL MEDICINE
Payer: MEDICARE

## 2024-04-24 ENCOUNTER — ANESTHESIA EVENT (OUTPATIENT)
Dept: GASTROENTEROLOGY | Facility: HOSPITAL | Age: 69
End: 2024-04-24

## 2024-04-24 VITALS
DIASTOLIC BLOOD PRESSURE: 64 MMHG | HEART RATE: 78 BPM | TEMPERATURE: 98.2 F | SYSTOLIC BLOOD PRESSURE: 120 MMHG | BODY MASS INDEX: 30.75 KG/M2 | RESPIRATION RATE: 16 BRPM | HEIGHT: 73 IN | WEIGHT: 232 LBS | OXYGEN SATURATION: 95 %

## 2024-04-24 DIAGNOSIS — K21.00 GASTROESOPHAGEAL REFLUX DISEASE WITH ESOPHAGITIS, UNSPECIFIED WHETHER HEMORRHAGE: Primary | ICD-10-CM

## 2024-04-24 DIAGNOSIS — K44.9 HIATAL HERNIA: ICD-10-CM

## 2024-04-24 DIAGNOSIS — R13.19 ESOPHAGEAL DYSPHAGIA: ICD-10-CM

## 2024-04-24 DIAGNOSIS — K21.9 GASTROESOPHAGEAL REFLUX DISEASE WITHOUT ESOPHAGITIS: ICD-10-CM

## 2024-04-24 LAB — GLUCOSE SERPL-MCNC: 112 MG/DL (ref 65–140)

## 2024-04-24 PROCEDURE — 43239 EGD BIOPSY SINGLE/MULTIPLE: CPT | Performed by: INTERNAL MEDICINE

## 2024-04-24 PROCEDURE — 82948 REAGENT STRIP/BLOOD GLUCOSE: CPT

## 2024-04-24 PROCEDURE — 88305 TISSUE EXAM BY PATHOLOGIST: CPT | Performed by: SPECIALIST

## 2024-04-24 RX ORDER — OMEPRAZOLE 40 MG/1
40 CAPSULE, DELAYED RELEASE ORAL 2 TIMES DAILY
Qty: 180 CAPSULE | Refills: 3 | Status: SHIPPED | OUTPATIENT
Start: 2024-04-24 | End: 2024-04-24

## 2024-04-24 RX ORDER — PROPOFOL 10 MG/ML
INJECTION, EMULSION INTRAVENOUS AS NEEDED
Status: DISCONTINUED | OUTPATIENT
Start: 2024-04-24 | End: 2024-04-24

## 2024-04-24 RX ORDER — SODIUM CHLORIDE 9 MG/ML
INJECTION, SOLUTION INTRAVENOUS CONTINUOUS PRN
Status: DISCONTINUED | OUTPATIENT
Start: 2024-04-24 | End: 2024-04-24

## 2024-04-24 RX ORDER — LIDOCAINE HCL/PF 100 MG/5ML
SYRINGE (ML) INJECTION AS NEEDED
Status: DISCONTINUED | OUTPATIENT
Start: 2024-04-24 | End: 2024-04-24

## 2024-04-24 RX ORDER — FENTANYL CITRATE 50 UG/ML
INJECTION, SOLUTION INTRAMUSCULAR; INTRAVENOUS AS NEEDED
Status: DISCONTINUED | OUTPATIENT
Start: 2024-04-24 | End: 2024-04-24

## 2024-04-24 RX ADMIN — PROPOFOL 150 MG: 10 INJECTION, EMULSION INTRAVENOUS at 09:17

## 2024-04-24 RX ADMIN — PROPOFOL 30 MG: 10 INJECTION, EMULSION INTRAVENOUS at 09:25

## 2024-04-24 RX ADMIN — SODIUM CHLORIDE: 9 INJECTION, SOLUTION INTRAVENOUS at 09:10

## 2024-04-24 RX ADMIN — FENTANYL CITRATE 50 MCG: 50 INJECTION INTRAMUSCULAR; INTRAVENOUS at 09:14

## 2024-04-24 RX ADMIN — LIDOCAINE HYDROCHLORIDE 100 MG: 20 INJECTION INTRAVENOUS at 09:17

## 2024-04-24 RX ADMIN — PROPOFOL 30 MG: 10 INJECTION, EMULSION INTRAVENOUS at 09:22

## 2024-04-24 RX ADMIN — PROPOFOL 20 MG: 10 INJECTION, EMULSION INTRAVENOUS at 09:19

## 2024-04-24 NOTE — TELEPHONE ENCOUNTER
I left a voice message to inform patient of Dr. Margot Lyles MD cardiovascular and thoracic surgeon name and phone number  of [phone] and stated to give us a call back with any further questions or concerns, thank you

## 2024-04-24 NOTE — ANESTHESIA POSTPROCEDURE EVALUATION
Post-Op Assessment Note    CV Status:  Stable  Pain Score: 0    Pain management: adequate       Mental Status:  Awake and alert   Hydration Status:  Stable   PONV Controlled:  None   Airway Patency:  Patent and adequate     Post Op Vitals Reviewed: Yes    No anethesia notable event occurred.    Staff: CRNA, Anesthesiologist               BP   142/63   Temp      Pulse  69   Resp   18   SpO2   98

## 2024-04-24 NOTE — H&P
[facility]  History & Physical    Patient Info:  Name: Cayetano Lucero   Age: 68 y.o.   YOB: 1955   MRN: [id number]    HPI: Cayetano Lucero is a 68 y.o. year old male who presents for EGD for re-evaluation of esophagitis. Patient having intermittent dysphagia with solid foods, but overall symptoms improved on PPI daily and Pepcid QHS.    REVIEW OF SYSTEMS: Per the HPI, and otherwise unremarkable.    Historical Information   Past Medical History:   Diagnosis Date    Allergic     Anemia     Arthritis     CAD (coronary artery disease) 2004    Cancer (HCC)     Leukemia    Cellulitis of scalp     CKD (chronic kidney disease) stage 3, GFR 30-59 ml/min (HCC)     CLL (chronic lymphocytic leukemia) (HCC)     COPD (chronic obstructive pulmonary disease) (HCC)     Diabetes mellitus (HCC)     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    Dyslipidemia     Edema extremities     Esophageal ulcer     H/O blood clots     Hemolytic anemia (HCC)     Hiatal hernia     History of TIA (transient ischemic attack)     Hyperlipidemia     Hypertension     Listeria infection 2018    Multiple gastric ulcers     Myocardial infarction (HCC) 2004    acute    Neutropenia (HCC)     Obese     Recurrent sinusitis     Thrombocytopenia (HCC)     Vertigo      Past Surgical History:   Procedure Laterality Date    ARTHROSCOPIC REPAIR ACL      lt knee    CARDIAC SURGERY      cardiac cath with stent    FOOT SURGERY      IR PORT CHECK  5/17/2019    KNEE ARTHROSCOPY      OTHER SURGICAL HISTORY      cardiac cath lesion 1, 1st adjunct treat device: stent    PORTACATH PLACEMENT      CO ESOPHAGOGASTRODUODENOSCOPY TRANSORAL DIAGNOSTIC N/A 10/5/2017    Procedure: ESOPHAGOGASTRODUODENOSCOPY (EGD) with bx;  Surgeon: Winifred Hansen DO;  Location: [facility];  Service: Gastroenterology    CO ESOPHAGOGASTRODUODENOSCOPY TRANSORAL DIAGNOSTIC N/A 3/8/2018    Procedure: ESOPHAGOGASTRODUODENOSCOPY (EGD) with biopsy;  Surgeon: Winifred Hansen DO;   Location: [facility];  Service: Gastroenterology    MI ESOPHAGOGASTRODUODENOSCOPY TRANSORAL DIAGNOSTIC N/A 2018    Procedure: ESOPHAGOGASTRODUODENOSCOPY (EGD) with Dilation;  Surgeon: Winifred Hansen DO;  Location: [facility];  Service: Gastroenterology    MI ESOPHAGOGASTRODUODENOSCOPY TRANSORAL DIAGNOSTIC N/A 10/18/2018    Procedure: ESOPHAGOGASTRODUODENOSCOPY (EGD) with dilation;  Surgeon: Edu Mejía MD;  Location: [facility];  Service: Gastroenterology    MI ESOPHAGOSCOPY FLEXIBLE TRANSORAL WITH BIOPSY N/A 2016    Procedure: ESOPHAGOGASTRODUODENOSCOPY (EGD); ESOPHAGEAL DILATION; ESOPHAGEAL BIOPSY;  Surgeon: Abdi Holcomb MD;  Location: [facility];  Service: Thoracic    TONSILLECTOMY      UPPER GASTROINTESTINAL ENDOSCOPY      x 6     Social History   Social History     Substance and Sexual Activity   Alcohol Use Yes    Alcohol/week: 2.0 standard drinks of alcohol    Types: 2 Cans of beer per week    Comment: social use as per [employer]     Social History     Substance and Sexual Activity   Drug Use Never     Social History     Tobacco Use   Smoking Status Former    Current packs/day: 0.00    Average packs/day: 2.0 packs/day for 33.0 years (66.0 ttl pk-yrs)    Types: Cigarettes    Start date:     Quit date:     Years since quittin.3    Passive exposure: Past   Smokeless Tobacco Never     Family History   Problem Relation Age of Onset    Leukemia Mother         chronic    Colon polyps Mother         sidmoid    Parkinsonism Father        MEDICATIONS & ALLERGIES:    Current Outpatient Medications   Medication Instructions    acyclovir (ZOVIRAX) 400 MG tablet TAKE 1 TABLET TWICE A DAY    Admelog SoloStar 100 units/mL injection pen Inject 12-14 units before each meal    albuterol 2.5 mg, Nebulization, Every 6 hours PRN    amLODIPine (NORVASC) 10 mg, Oral, Daily    aspirin 81 mg, Oral, Daily, Every other day    benzonatate (TESSALON) 200 MG capsule TAKE 1 CAPSULE THREE TIMES A DAY AS NEEDED  FOR COUGH    bisacodyl (DULCOLAX) 5 mg EC tablet Take as directed as per written office instructions    Blood Glucose Monitoring Suppl (FreeStyle Freedom Lite) w/Device KIT Does not apply, 3 times daily    citalopram (CeleXA) 40 mg tablet TAKE 1 TABLET DAILY    Continuous Blood Gluc  (FreeStyle Ashley 2 Bethel) JOE Use to scan sensor premeals and at bedtime    Continuous Blood Gluc Sensor (FreeStyle Ashley 2 Sensor) MISC Apply 1 sensor every 14 days. Use as directed with Freestyle Ashley 2 reader.    Diclofenac Sodium (VOLTAREN) 4 g, Topical, 4 times daily    fenofibrate (TRICOR) 145 mg tablet TAKE 1 TABLET DAILY    folic acid (FOLVITE) 800 mcg, Oral, Daily    gabapentin (NEURONTIN) 600 MG tablet TAKE 1 TABLET DAILY    glucose blood (FREESTYLE LITE) test strip Check blood sugar 3 times a day    Ibrutinib 140 mg, Oral, Daily    Injection Device for Insulin (CeQur Simplicity 2U) JOE Use 1 patch every 3 days, disp 1 box of 10 patches for 30 days supply    Injection Device for Insulin (CeQur Simplicity ) MISC Use to insert simplicity device.    insulin lispro (Admelog) 100 units/mL injection Use up to 65 units per day as directed via Alereon simplicity device.    Insulin Pen Needle (BD Pen Needle Inez U/F) 32G X 4 MM MISC Does not apply, 3 times daily    Lancets (FREESTYLE) lancets Use as instructed--test 2 times a day<BR><BR>E 11.9    Lancets (freestyle) lancets TEST BLOOD SUGAR 3 TIMES A DAY    LORazepam (ATIVAN) 0.5 mg, Oral, Daily PRN    losartan (COZAAR) 100 MG tablet TAKE 1 TABLET DAILY    losartan (COZAAR) 100 mg, Oral, Daily    mometasone-formoterol (Dulera) 200-5 MCG/ACT inhaler 2 puffs, Inhalation, 2 times daily, Rinse mouth after use.    multivitamin (THERAGRAN) TABS 1 tablet, Oral, Daily    naloxone (NARCAN) 4 mg/0.1 mL nasal spray Administer 1 spray into a nostril. If no response after 2-3 minutes, give another dose in the other nostril using a new spray.    obinutuzumab (GAZYVA) 1000 mg/40 mL  "SOLN Intravenous    oxyCODONE (ROXICODONE) 10 mg, Oral, Every 6 hours PRN, For ongoing pain control    pantoprazole (PROTONIX) 40 mg, Oral, Daily    predniSONE 5 mg, Oral, Daily    sodium chloride, PF, 0.9 % 10 mL, Intracatheter, See admin instructions, 10mL into port before and after ampicillin doses    Tresiba FlexTouch 18 Units, Subcutaneous, Daily at bedtime    Ventolin  (90 Base) MCG/ACT inhaler 2 puffs, Inhalation, Every 6 hours PRN    zolpidem (AMBIEN) 5 mg, Oral, Daily at bedtime PRN     Allergies   Allergen Reactions    Heparin Other (See Comments)     Other reaction(s): Blood Disorders  clot    Macrolides And Ketolides Other (See Comments)     Interacts with other meds he is taking    Simvastatin Myalgia       PHYSICAL EXAM:    Objective   Blood pressure 162/79, pulse 71, temperature 98.2 °F (36.8 °C), temperature source Tympanic, resp. rate 16, height 6' 1\" (1.854 m), weight 105 kg (232 lb), SpO2 97%. Body mass index is 30.61 kg/m².    Gen: NAD  CV: RRR  CHEST: Clear  ABD: Soft, NT/ND  EXT: No edema    ASSESSMENT AND PLAN:  This is a 68 y.o. year old male here for EGD, and he is stable and optimized for his procedure.      Alpa Browning D.O.  Gastroenterology Fellow  [facility]  Division of Gastroenterology & Hepatology  Available on [employer]    ** Please Note: This note is constructed using a voice recognition dictation system. **   "

## 2024-04-24 NOTE — TELEPHONE ENCOUNTER
----- Message from Winifred Hansen DO sent at 4/24/2024 10:20 AM EDT -----  Please call patient and give him Dr. Margot horton's name.

## 2024-04-24 NOTE — ANESTHESIA PREPROCEDURE EVALUATION
Procedure:  EGD    Relevant Problems   CARDIO   (+) CAD (coronary artery disease)   (+) Hyperlipidemia   (+) Hypertension      ENDO   (+) Type 2 diabetes mellitus with diabetic polyneuropathy, with long-term current use of insulin (HCC)   (+) Type 2 diabetes mellitus with hyperglycemia, with long-term current use of insulin (HCC)      GI/HEPATIC   (+) Dysphagia   (+) Esophageal dysphagia   (+) Gastroesophageal reflux disease without esophagitis   (+) Ulcer of esophagus without bleeding      /RENAL   (+) Stage 3 chronic kidney disease, unspecified whether stage 3a or 3b CKD (HCC)      HEMATOLOGY   (+) Anemia, chronic disease   (+) Autoimmune hemolytic anemia (HCC)   (+) 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)   (+) Hemolytic anemia (HCC)   (+) Pancytopenia (HCC)   (+) Thrombocytopenia (HCC)      NEURO/PSYCH   (+) Chronic right shoulder pain   (+) Depression, recurrent (HCC)   (+) Headache around the eyes      PULMONARY   (+) Chronic obstructive pulmonary disease (HCC)      Other   (+) CLL (chronic lymphocytic leukemia) (HCC)   (+) Chronic lymphocytic leukemia (HCC)      BMI 31    Physical Exam    Airway    Mallampati score: II  TM Distance: >3 FB  Neck ROM: full     Dental    upper dentures and lower dentures    Cardiovascular      Pulmonary      Other Findings        Anesthesia Plan  ASA Score- 3     Anesthesia Type- IV sedation with anesthesia with ASA Monitors.         Additional Monitors:     Airway Plan:            Plan Factors-    Chart reviewed.    Patient summary reviewed.                  Induction- intravenous.    Postoperative Plan-     Informed Consent- Anesthetic plan and risks discussed with patient.  I personally reviewed this patient with the CRNA. Discussed and agreed on the Anesthesia Plan with the CRNA..

## 2024-04-25 ENCOUNTER — TELEPHONE (OUTPATIENT)
Dept: GASTROENTEROLOGY | Facility: HOSPITAL | Age: 69
End: 2024-04-25

## 2024-04-25 ENCOUNTER — TELEPHONE (OUTPATIENT)
Age: 69
End: 2024-04-25

## 2024-04-25 PROCEDURE — 88305 TISSUE EXAM BY PATHOLOGIST: CPT | Performed by: SPECIALIST

## 2024-04-25 NOTE — TELEPHONE ENCOUNTER
----- Message from Aimee Rosas sent at 4/25/2024  8:42 AM EDT -----  Please start prior auth for medication Voquezna for patient. Thank you  ----- Message -----  From: Winifred Hansen DO  Sent: 4/24/2024   3:16 PM EDT  To: #    I put an order in for him for voquezna and sent it to [facility] pharmacy in [place].  Anything else I need to do?

## 2024-04-26 ENCOUNTER — TELEPHONE (OUTPATIENT)
Dept: HEMATOLOGY ONCOLOGY | Facility: CLINIC | Age: 69
End: 2024-04-26

## 2024-04-26 ENCOUNTER — TELEPHONE (OUTPATIENT)
Dept: GASTROENTEROLOGY | Facility: CLINIC | Age: 69
End: 2024-04-26

## 2024-04-26 NOTE — TELEPHONE ENCOUNTER
Cayetano in response to a referral that was received for patient to establish care with Thoracic Surgery.     Outreach was made to complete patient's intake questionnaire .    Patient's intake questionnaire was reviewed and complete. Patient's intake has been sent to the team for clinical review.

## 2024-04-26 NOTE — TELEPHONE ENCOUNTER
Pt returned Dr Hansen's call and stated that she asked him to call her right back. Please have Dr Hansen reach out to pt either before 3 or after 3:30.

## 2024-04-26 NOTE — TELEPHONE ENCOUNTER
I called pt.  Biopsies show candida esophagitis.  There was  no answer so I left a message.  Will call in rx for pt.

## 2024-04-26 NOTE — TELEPHONE ENCOUNTER
I called Cayetano in response to a referral that was received for patient to establish care with Thoracic Surgery.     Outreach was made to complete patient's intake questionnaire .    I left a voicemail explaining the reason for my call and advised patient to call [facility] at [phone].  Another attempt will be made to contact patient.

## 2024-04-29 ENCOUNTER — TELEPHONE (OUTPATIENT)
Dept: GASTROENTEROLOGY | Facility: HOSPITAL | Age: 69
End: 2024-04-29

## 2024-04-29 DIAGNOSIS — B37.81 ESOPHAGEAL CANDIDIASIS (HCC): Primary | ICD-10-CM

## 2024-04-29 NOTE — TELEPHONE ENCOUNTER
Called patient and advised of possible reaction between fluconazole and his CLL medication, Ibrutinib. I have reached out to Dr. Mejia and will call him in next 24-48 hours (as soon as I hear back) in regard to treatment plan. He understood and had no additional questions at this time

## 2024-04-30 ENCOUNTER — TELEPHONE (OUTPATIENT)
Dept: HEMATOLOGY ONCOLOGY | Facility: CLINIC | Age: 69
End: 2024-04-30

## 2024-04-30 ENCOUNTER — HOSPITAL ENCOUNTER (OUTPATIENT)
Dept: INFUSION CENTER | Facility: CLINIC | Age: 69
Discharge: HOME | End: 2024-04-30
Payer: MEDICARE

## 2024-04-30 VITALS
TEMPERATURE: 97.7 F | WEIGHT: 229.28 LBS | DIASTOLIC BLOOD PRESSURE: 81 MMHG | SYSTOLIC BLOOD PRESSURE: 156 MMHG | RESPIRATION RATE: 18 BRPM | HEART RATE: 73 BPM | BODY MASS INDEX: 30.25 KG/M2

## 2024-04-30 DIAGNOSIS — D80.1 HYPOGAMMAGLOBULINEMIA, ACQUIRED (HCC): Primary | ICD-10-CM

## 2024-04-30 DIAGNOSIS — B37.81 ESOPHAGEAL CANDIDIASIS (HCC): Primary | ICD-10-CM

## 2024-04-30 DIAGNOSIS — C91.10 CLL (CHRONIC LYMPHOCYTIC LEUKEMIA) (HCC): ICD-10-CM

## 2024-04-30 PROCEDURE — 96367 TX/PROPH/DG ADDL SEQ IV INF: CPT

## 2024-04-30 PROCEDURE — 96365 THER/PROPH/DIAG IV INF INIT: CPT

## 2024-04-30 PROCEDURE — 96366 THER/PROPH/DIAG IV INF ADDON: CPT

## 2024-04-30 RX ORDER — POSACONAZOLE 40 MG/ML
200 SUSPENSION ORAL 2 TIMES DAILY WITH MEALS
Qty: 140 ML | Refills: 0 | Status: SHIPPED | OUTPATIENT
Start: 2024-04-30 | End: 2024-05-14

## 2024-04-30 RX ORDER — ACETAMINOPHEN 325 MG/1
650 TABLET ORAL ONCE
OUTPATIENT
Start: 2024-05-28

## 2024-04-30 RX ORDER — SODIUM CHLORIDE 9 MG/ML
20 INJECTION, SOLUTION INTRAVENOUS ONCE
Status: COMPLETED | OUTPATIENT
Start: 2024-04-30 | End: 2024-04-30

## 2024-04-30 RX ORDER — ACETAMINOPHEN 325 MG/1
650 TABLET ORAL ONCE
Status: COMPLETED | OUTPATIENT
Start: 2024-04-30 | End: 2024-04-30

## 2024-04-30 RX ORDER — SODIUM CHLORIDE 9 MG/ML
20 INJECTION, SOLUTION INTRAVENOUS ONCE
OUTPATIENT
Start: 2024-05-28

## 2024-04-30 RX ADMIN — SODIUM CHLORIDE 20 ML/HR: 0.9 INJECTION, SOLUTION INTRAVENOUS at 08:14

## 2024-04-30 RX ADMIN — ACETAMINOPHEN 650 MG: 325 TABLET ORAL at 08:16

## 2024-04-30 RX ADMIN — Medication 20 G: at 09:10

## 2024-04-30 RX ADMIN — DIPHENHYDRAMINE HYDROCHLORIDE 12.5 MG: 50 INJECTION, SOLUTION INTRAMUSCULAR; INTRAVENOUS at 08:18

## 2024-04-30 RX ADMIN — DEXAMETHASONE SODIUM PHOSPHATE 4 MG: 100 INJECTION INTRAMUSCULAR; INTRAVENOUS at 08:38

## 2024-04-30 NOTE — TELEPHONE ENCOUNTER
I called Cayetano in response to a referral that was received for patient to establish care with Thoracic Surgery.     Outreach was made to schedule a consultation.    I left a voicemail explaining the reason for my call and advised patient to call [facility] at [phone].  Another attempt will be made to contact patient.

## 2024-04-30 NOTE — TELEPHONE ENCOUNTER
I called Cayetano in response to a referral that was received for patient to establish care with Thoracic Surgery.     Outreach was made to schedule a consultation.    Patient declined scheduling. The referral has been closed.

## 2024-04-30 NOTE — TELEPHONE ENCOUNTER
Cayetano calling in response to a referral that was received for patient to establish care with Thoracic Surgery.     Outreach was made to schedule a consultation.    A consultation was scheduled for patient during this call. Patient is scheduled on 5/16/24 at 11:20am with Dr Gonsales at the [facility]

## 2024-04-30 NOTE — TELEPHONE ENCOUNTER
Spoke with Dr. Mejia - states patient is on a small dose of ibrutinib, recommend using posaconazole at half the dose to lower the risk of drug interaction.    Posaconazole 200mg BID x14 days sent to pharmacy (full dose would be 400mg BID x14 days). Called patient to update. Understands directions and that he can continue Ibrutinib. Advised to call our office with additional questions or concerns.

## 2024-04-30 NOTE — PROGRESS NOTES
Pt arrives with no complaints, tolerated IGG today without complications. Confirmed appt back 5-14-24, 8:00, AVS declined

## 2024-05-01 DIAGNOSIS — J31.0 CHRONIC RHINITIS: ICD-10-CM

## 2024-05-01 RX ORDER — BENZONATATE 200 MG/1
CAPSULE ORAL
Qty: 20 CAPSULE | Refills: 3 | Status: SHIPPED | OUTPATIENT
Start: 2024-05-01

## 2024-05-02 ENCOUNTER — HOSPITAL ENCOUNTER (OUTPATIENT)
Dept: GASTROENTEROLOGY | Facility: HOSPITAL | Age: 69
Discharge: HOME | End: 2024-05-02
Attending: INTERNAL MEDICINE
Payer: MEDICARE

## 2024-05-02 ENCOUNTER — NURSE TRIAGE (OUTPATIENT)
Age: 69
End: 2024-05-02

## 2024-05-02 ENCOUNTER — TELEPHONE (OUTPATIENT)
Age: 69
End: 2024-05-02

## 2024-05-02 VITALS
HEART RATE: 80 BPM | DIASTOLIC BLOOD PRESSURE: 73 MMHG | SYSTOLIC BLOOD PRESSURE: 163 MMHG | TEMPERATURE: 98.4 F | RESPIRATION RATE: 16 BRPM | OXYGEN SATURATION: 95 %

## 2024-05-02 DIAGNOSIS — K21.00 GASTROESOPHAGEAL REFLUX DISEASE WITH ESOPHAGITIS, UNSPECIFIED WHETHER HEMORRHAGE: ICD-10-CM

## 2024-05-02 PROCEDURE — 91010 ESOPHAGUS MOTILITY STUDY: CPT

## 2024-05-02 NOTE — TELEPHONE ENCOUNTER
Reviewed other notes, Agree do not take any insulin today and if lows, treat with glucose tabs and make GI aware.

## 2024-05-02 NOTE — TELEPHONE ENCOUNTER
"Call transferred from [place], patient calling states he has a GI procedure today and wondering what the protocol is if his blood sugar drops low during the procedure.  Spoke with patient and determined he was trying to reach the GI office, he thought he was calling them.  He said he was missing \"page 2' of the prep instructions which included info for diabetic patients. Informed him I can still send a message to his endo provider to make them aware that he will be having the procedure.  Patient said he is fasting today and did not take any of his diabetes medications.  He will call GI for any further instructions.   "

## 2024-05-02 NOTE — TELEPHONE ENCOUNTER
"Pt calling in, he is scheduled for esoph manometry study today at 2 pm and is a diabetic. He did not take any insulin today and current BS is 114. He is concerned if he is NPO and his BS drops what can he do. I tried to call [facility] to ask to verify but no answer and VM was left.   I advised pt if that happens to take glucose tablets as he has those at home. He understood.     Reason for Disposition   Information only question and nurse able to answer    Answer Assessment - Initial Assessment Questions  1. REASON FOR CALL or QUESTION: \"What is your reason for calling today?\" or \"How can I best help you?\" or \"What question do you have that I can help answer?\"      See notes    Protocols used: Information Only Call - No Triage-ADULT-[place]    "

## 2024-05-02 NOTE — TELEPHONE ENCOUNTER
Patient is scheduled for a Manometry today and is diabetic. Patient is questioning what he is to do if his sugar drops as he is NPO six hours prior to procedure. Currently his reading is 114 but last night he did drop to 69. Attempted to contact Manometry department, unable to reach a staff member. Call warm transferred to clinical triage for assistance.

## 2024-05-02 NOTE — PERIOPERATIVE NURSING NOTE
"Patient brought in the room and explained the esophageal manometry procedure Patient had been called earlier in the day at 1215 . Discussed with patient prior to arrival to procedure to have his glucose tablets available or take some orange juice if he felt he was symptomatic. Patient did agreed with the plan. Patient with continuous blood glucose monitor device and he checked his current blood sugar level=100.  After the confirmation of allergies, lidocaine 2 % viscous given via nostrils and  a transnasal insertion of the High Resolution esophageal manometry catheter was inserted via left nostril after couple of attempts on each nostril. Patient given water to drink during the insertion and once the catheter inserted pressure bands of both Upper esophageal sphincter  (UES) and Lower esophageal sphincter ( LES) were observed on the color contour. Patient instructed to take a deep breath to verify placement of the catheter, diaphragmatic pinch noted on inspiration. Catheter was secured to left cheek. Patient was assisted to supine position .Patient was instructed to relax  while acclimating the catheter for about 5 minutes. A 30 second baseline resting pressure was obtained to identify the UES and LES followed by a series of 10 liquid swallows using 5 cc room temperature normal saline to assess esophageal motility and bolus transit. Patient's glucose device phone alarmed for level of glucose. Patient stable and checked his  glucose level to be =63. Patient reporting he feels stable and he has his glucose tablets at hand and he will let me know if starts feeling symptomatic.\" I am good until my glucose level is 50\" patient continuously being monitored and then I  administered 10 viscous swallows using 5 cc viscous solution, 1 multiple rapid drink swallow using 2 cc room temperature normal saline given a total of 5 drinks. Patient  sit up at the on stretcher upto 90% , patient checked his glucose level via his portable " device and it registered 70. Patient reports feeling good with no symptoms. given 5 upright liquid swallows using 5 cc room temperature normal saline and 1 rapid drink challenge using 200 cc room temperature water. At the end of the procedure the high resolution esophageal manometry catheter was removed from the nostril intact. Patient once again checked his glucose reading via his continuous glucose device and  = 90. Patient given discharge instructions and patient left in stable condition.

## 2024-05-09 PROCEDURE — 91010 ESOPHAGUS MOTILITY STUDY: CPT | Performed by: INTERNAL MEDICINE

## 2024-05-10 DIAGNOSIS — C91.10 CLL (CHRONIC LYMPHOCYTIC LEUKEMIA) (HCC): Primary | ICD-10-CM

## 2024-05-10 RX ORDER — ACETAMINOPHEN 325 MG/1
650 TABLET ORAL ONCE
Status: CANCELLED | OUTPATIENT
Start: 2024-05-14

## 2024-05-10 RX ORDER — SODIUM CHLORIDE 9 MG/ML
20 INJECTION, SOLUTION INTRAVENOUS ONCE
Status: CANCELLED | OUTPATIENT
Start: 2024-05-14

## 2024-05-14 ENCOUNTER — HOSPITAL ENCOUNTER (OUTPATIENT)
Dept: INFUSION CENTER | Facility: CLINIC | Age: 69
Discharge: HOME | End: 2024-05-14
Payer: MEDICARE

## 2024-05-14 VITALS
HEART RATE: 77 BPM | WEIGHT: 230.6 LBS | TEMPERATURE: 97.6 F | DIASTOLIC BLOOD PRESSURE: 93 MMHG | RESPIRATION RATE: 18 BRPM | HEIGHT: 73 IN | SYSTOLIC BLOOD PRESSURE: 164 MMHG | BODY MASS INDEX: 30.56 KG/M2

## 2024-05-14 DIAGNOSIS — C91.10 CLL (CHRONIC LYMPHOCYTIC LEUKEMIA) (HCC): Primary | ICD-10-CM

## 2024-05-14 DIAGNOSIS — R76.8 LOW SERUM IGG FOR AGE: ICD-10-CM

## 2024-05-14 LAB
ALBUMIN SERPL BCP-MCNC: 4.1 G/DL (ref 3.5–5)
ALP SERPL-CCNC: 44 U/L (ref 34–104)
ALT SERPL W P-5'-P-CCNC: 52 U/L (ref 7–52)
ANION GAP SERPL CALCULATED.3IONS-SCNC: 5 MMOL/L (ref 4–13)
AST SERPL W P-5'-P-CCNC: 37 U/L (ref 13–39)
BASOPHILS # BLD AUTO: 0.06 THOUSANDS/ÂΜL (ref 0–0.1)
BASOPHILS NFR BLD AUTO: 1 % (ref 0–1)
BILIRUB SERPL-MCNC: 0.5 MG/DL (ref 0.2–1)
BUN SERPL-MCNC: 16 MG/DL (ref 5–25)
CALCIUM SERPL-MCNC: 8.9 MG/DL (ref 8.4–10.2)
CHLORIDE SERPL-SCNC: 107 MMOL/L (ref 96–108)
CO2 SERPL-SCNC: 28 MMOL/L (ref 21–32)
CREAT SERPL-MCNC: 1.04 MG/DL (ref 0.6–1.3)
EOSINOPHIL # BLD AUTO: 0.4 THOUSAND/ÂΜL (ref 0–0.61)
EOSINOPHIL NFR BLD AUTO: 9 % (ref 0–6)
ERYTHROCYTE [DISTWIDTH] IN BLOOD BY AUTOMATED COUNT: 13.7 % (ref 11.6–15.1)
GFR SERPL CREATININE-BSD FRML MDRD: 72 ML/MIN/1.73SQ M
GLUCOSE SERPL-MCNC: 145 MG/DL (ref 65–140)
HCT VFR BLD AUTO: 44 % (ref 36.5–49.3)
HGB BLD-MCNC: 14.3 G/DL (ref 12–17)
IGG SERPL-MCNC: 519 MG/DL (ref 635–1741)
IMM GRANULOCYTES # BLD AUTO: 0.26 THOUSAND/UL (ref 0–0.2)
IMM GRANULOCYTES NFR BLD AUTO: 6 % (ref 0–2)
LYMPHOCYTES # BLD AUTO: 0.71 THOUSANDS/ÂΜL (ref 0.6–4.47)
LYMPHOCYTES NFR BLD AUTO: 16 % (ref 14–44)
MCH RBC QN AUTO: 30.4 PG (ref 26.8–34.3)
MCHC RBC AUTO-ENTMCNC: 32.5 G/DL (ref 31.4–37.4)
MCV RBC AUTO: 93 FL (ref 82–98)
MONOCYTES # BLD AUTO: 0.55 THOUSAND/ÂΜL (ref 0.17–1.22)
MONOCYTES NFR BLD AUTO: 13 % (ref 4–12)
NEUTROPHILS # BLD AUTO: 2.35 THOUSANDS/ÂΜL (ref 1.85–7.62)
NEUTS SEG NFR BLD AUTO: 55 % (ref 43–75)
NRBC BLD AUTO-RTO: 0 /100 WBCS
PLATELET # BLD AUTO: 154 THOUSANDS/UL (ref 149–390)
PMV BLD AUTO: 11.7 FL (ref 8.9–12.7)
POTASSIUM SERPL-SCNC: 3.8 MMOL/L (ref 3.5–5.3)
PROT SERPL-MCNC: 6.1 G/DL (ref 6.4–8.4)
RBC # BLD AUTO: 4.71 MILLION/UL (ref 3.88–5.62)
RETICS # AUTO: ABNORMAL 10*3/UL (ref 14356–105094)
RETICS # CALC: 2.22 % (ref 0.37–1.87)
SODIUM SERPL-SCNC: 140 MMOL/L (ref 135–147)
WBC # BLD AUTO: 4.33 THOUSAND/UL (ref 4.31–10.16)

## 2024-05-14 PROCEDURE — 82784 ASSAY IGA/IGD/IGG/IGM EACH: CPT

## 2024-05-14 PROCEDURE — 80053 COMPREHEN METABOLIC PANEL: CPT

## 2024-05-14 PROCEDURE — 85045 AUTOMATED RETICULOCYTE COUNT: CPT | Performed by: PHYSICIAN ASSISTANT

## 2024-05-14 PROCEDURE — 85025 COMPLETE CBC W/AUTO DIFF WBC: CPT

## 2024-05-14 RX ORDER — ACETAMINOPHEN 325 MG/1
650 TABLET ORAL ONCE
Status: COMPLETED | OUTPATIENT
Start: 2024-05-14 | End: 2024-05-14

## 2024-05-14 RX ORDER — SODIUM CHLORIDE 9 MG/ML
20 INJECTION, SOLUTION INTRAVENOUS ONCE
Status: COMPLETED | OUTPATIENT
Start: 2024-05-14 | End: 2024-05-14

## 2024-05-14 RX ADMIN — ACETAMINOPHEN 650 MG: 325 TABLET ORAL at 08:36

## 2024-05-14 RX ADMIN — OBINUTUZUMAB 1000 MG: 1000 INJECTION, SOLUTION, CONCENTRATE INTRAVENOUS at 09:43

## 2024-05-14 RX ADMIN — DEXAMETHASONE SODIUM PHOSPHATE 20 MG: 100 INJECTION INTRAMUSCULAR; INTRAVENOUS at 08:34

## 2024-05-14 RX ADMIN — SODIUM CHLORIDE 20 ML/HR: 0.9 INJECTION, SOLUTION INTRAVENOUS at 08:32

## 2024-05-14 RX ADMIN — DIPHENHYDRAMINE HYDROCHLORIDE 25 MG: 50 INJECTION, SOLUTION INTRAMUSCULAR; INTRAVENOUS at 08:58

## 2024-05-14 NOTE — PROGRESS NOTES
Cayetano Lucero  tolerated treatment well with no complications.      Cayetano Lucero is aware of future appt on 5/29 at 8am.     AVS printed and given to Cayetano Lucero:    No (Declined by Cayetano Lucero)

## 2024-05-16 ENCOUNTER — OFFICE VISIT (OUTPATIENT)
Dept: CARDIAC SURGERY | Facility: CLINIC | Age: 69
End: 2024-05-16
Payer: MEDICARE

## 2024-05-16 VITALS
HEIGHT: 73 IN | RESPIRATION RATE: 14 BRPM | OXYGEN SATURATION: 97 % | WEIGHT: 233 LBS | HEART RATE: 87 BPM | BODY MASS INDEX: 30.88 KG/M2 | DIASTOLIC BLOOD PRESSURE: 82 MMHG | TEMPERATURE: 97.6 F | SYSTOLIC BLOOD PRESSURE: 147 MMHG

## 2024-05-16 DIAGNOSIS — K21.00 GASTROESOPHAGEAL REFLUX DISEASE WITH ESOPHAGITIS, UNSPECIFIED WHETHER HEMORRHAGE: ICD-10-CM

## 2024-05-16 DIAGNOSIS — K44.9 HIATAL HERNIA: ICD-10-CM

## 2024-05-16 PROCEDURE — 99205 OFFICE O/P NEW HI 60 MIN: CPT | Performed by: THORACIC SURGERY (CARDIOTHORACIC VASCULAR SURGERY)

## 2024-05-16 NOTE — LETTER
May 16, 2024     Meseret Elliott DO  [address]  [address]  [facility], [place] [address]    Patient: Cayetano Lucero   YOB: 1955   Date of Visit: 5/16/2024       Dear Dr. Elliott:    Thank you for referring Cayetano Lucero to me for evaluation. Below are my notes for this consultation.    If you have questions, please do not hesitate to call me. I look forward to following your patient along with you.         Sincerely,        Immanuel Gonsales MD        CC: DO Gideon Marquez MD Dustin Manchester, MD  5/16/2024  1:04 PM  Incomplete  Thoracic Consult  Assessment/Plan:      Diagnoses and all orders for this visit:    Gastroesophageal reflux disease with esophagitis, unspecified whether hemorrhage  -     Ambulatory Referral to Thoracic Surgery    Hiatal hernia  -     Ambulatory Referral to Thoracic Surgery            Thoracic History    Problem: Hiatal hernia with GERD and recurrent esophageal ulcers    Procedure:     Pathology:      Subjective:    Patient ID: Cayetano Lucero is a 68 y.o. male.    HPI  Cayetano is a 68-year-old male with history of CLL, coronary artery disease, hypertension, COPD, type 2 diabetes mellitus on insulin, stage III CKD, history of HIT, and history of TIAs who we are seeing in consultation for a hiatal hernia and symptomatic reflux.  He has had longstanding reflux for over 20 years.  This is also accompanied by dysphagia.  He has had serial endoscopies with dilation.  The last one was a few years ago.  His biggest complaint at this time is dysphagia with trouble getting food through.  Whenever he gets stuck to 90% of the time he can eventually get this to pass with time and liquids but 10% of the time he has to vomit it up.  He does have daily heartburn.  He is dissatisfied with his current condition.  GERD HRQL score was significant 44.    The following portions of the patient's history were reviewed and updated as appropriate: allergies, current  medications, past family history, past medical history, past social history, past surgical history and problem list.    Past Medical History:   Diagnosis Date   • Allergic    • Anemia    • Arthritis    • CAD (coronary artery disease) 2004   • Cancer (HCC)     Leukemia   • Cellulitis of scalp    • CKD (chronic kidney disease) stage 3, GFR 30-59 ml/min (HCC)    • CLL (chronic lymphocytic leukemia) (HCC)    • COPD (chronic obstructive pulmonary disease) (HCC)    • Diabetes mellitus (HCC)     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   • Dyslipidemia    • Edema extremities    • Esophageal ulcer    • H/O blood clots    • Hemolytic anemia (HCC)    • Hiatal hernia    • History of TIA (transient ischemic attack)    • Hyperlipidemia    • Hypertension    • Listeria infection 2018   • Multiple gastric ulcers    • Myocardial infarction (HCC) 2004    acute   • Neutropenia (HCC)    • Obese    • Recurrent sinusitis    • Thrombocytopenia (HCC)    • Vertigo       Past Surgical History:   Procedure Laterality Date   • ARTHROSCOPIC REPAIR ACL      lt knee   • CARDIAC SURGERY      cardiac cath with stent   • FOOT SURGERY     • IR PORT CHECK  5/17/2019   • KNEE ARTHROSCOPY     • OTHER SURGICAL HISTORY      cardiac cath lesion 1, 1st adjunct treat device: stent   • PORTACATH PLACEMENT     • FL ESOPHAGOGASTRODUODENOSCOPY TRANSORAL DIAGNOSTIC N/A 10/5/2017    Procedure: ESOPHAGOGASTRODUODENOSCOPY (EGD) with bx;  Surgeon: Winifred Hansen DO;  Location: [facility];  Service: Gastroenterology   • [place] ESOPHAGOGASTRODUODENOSCOPY TRANSORAL DIAGNOSTIC N/A 3/8/2018    Procedure: ESOPHAGOGASTRODUODENOSCOPY (EGD) with biopsy;  Surgeon: Winifred Hansen DO;  Location: [facility];  Service: Gastroenterology   • FL ESOPHAGOGASTRODUODENOSCOPY TRANSORAL DIAGNOSTIC N/A 6/20/2018    Procedure: ESOPHAGOGASTRODUODENOSCOPY (EGD) with Dilation;  Surgeon: Winifred Hansen DO;  [facility]: [facility];  Service: Gastroenterology   • FL ESOPHAGOGASTRODUODENOSCOPY TRANSORAL DIAGNOSTIC N/A  10/18/2018    Procedure: ESOPHAGOGASTRODUODENOSCOPY (EGD) with dilation;  Surgeon: Edu Mejía MD;  Location: [facility];  Service: Gastroenterology   • LA ESOPHAGOSCOPY FLEXIBLE TRANSORAL WITH BIOPSY N/A 2016    Procedure: ESOPHAGOGASTRODUODENOSCOPY (EGD); ESOPHAGEAL DILATION; ESOPHAGEAL BIOPSY;  Surgeon: Abdi Holcomb MD;  [facility]: [facility];  Service: Thoracic   • TONSILLECTOMY     • UPPER GASTROINTESTINAL ENDOSCOPY      x 6      Family History   Problem Relation Age of Onset   • Leukemia Mother         chronic   • Colon polyps Mother         sidmoid   • Parkinsonism Father       Social History     Socioeconomic History   • Marital status: /Civil Union     Spouse name: Not on file   • Number of children: Not on file   • Years of education: Not on file   • Highest education level: Not on file   Occupational History   • Occupation: [place] "[employer]"   • Occupation: retired    Tobacco Use   • Smoking status: Former     Current packs/day: 0.00     Average packs/day: 2.0 packs/day for 33.0 years (66.0 ttl pk-yrs)     Types: Cigarettes     Start date:      Quit date:      Years since quittin.3     Passive exposure: Past   • Smokeless tobacco: Never   Vaping Use   • Vaping status: Never Used   Substance and Sexual Activity   • Alcohol use: Yes     Alcohol/week: 2.0 standard drinks of alcohol     Types: 2 Cans of beer per week     Comment: social use as per [employer]   • Drug use: Never   • Sexual activity: Not on file   Other Topics Concern   • Not on file   Social History Narrative   • Not on file     Social Determinants of Health     Financial Resource Strain: Low Risk  (2024)    Overall Financial Resource Strain (CARDIA)    • Difficulty of Paying Living Expenses: Not hard at all   Food Insecurity: Food Insecurity Present (2024)    Hunger Vital Sign    • Worried About Running Out of Food in the Last Year: Sometimes true    • Ran Out of Food in the Last Year: Sometimes true    Transportation Needs: No Transportation Needs (4/4/2024)    PRAPARE - Transportation    • Lack of Transportation (Medical): No    • Lack of Transportation (Non-Medical): No   Physical Activity: Not on file   Stress: Not on file   Social Connections: Not on file   Intimate Partner Violence: Not on file   Housing Stability: Low Risk  (4/4/2024)    Housing Stability Vital Sign    • Unable to Pay for Housing in the Last Year: No    • Number of Places Lived in the Last Year: 1    • Unstable Housing in the Last Year: No      Review of Systems   Constitutional:  Negative for activity change, appetite change, chills, diaphoresis, fatigue, fever and unexpected weight change.   HENT:  Positive for trouble swallowing.    Eyes: Negative.    Respiratory:  Negative for apnea, cough, chest tightness, shortness of breath, wheezing and stridor.    Cardiovascular:  Negative for chest pain, palpitations and leg swelling.   Gastrointestinal:  Positive for abdominal distention and vomiting. Negative for abdominal pain, blood in stool, constipation, diarrhea and nausea.   Endocrine: Negative.    Genitourinary: Negative.    Musculoskeletal: Negative.    Skin: Negative.    Allergic/Immunologic: Negative.    Neurological: Negative.    Hematological:  Bruises/bleeds easily.   Psychiatric/Behavioral: Negative.           Objective:   Physical Exam  Vitals and nursing note reviewed.   Constitutional:       General: He is not in acute distress.     Appearance: Normal appearance. He is well-developed. He is not diaphoretic.   HENT:      Head: Normocephalic and atraumatic.      Mouth/Throat:      Pharynx: No oropharyngeal exudate.   Eyes:      General: No scleral icterus.     Conjunctiva/sclera: Conjunctivae normal.      Pupils: Pupils are equal, round, and reactive to light.   Neck:      Thyroid: No thyromegaly.      Vascular: No JVD.      Trachea: No tracheal deviation.   Cardiovascular:      Rate and Rhythm: Normal rate and regular rhythm.     "  Heart sounds: Normal heart sounds. No murmur heard.     No friction rub. No gallop.   Pulmonary:      Effort: Pulmonary effort is normal. No respiratory distress.      Breath sounds: Normal breath sounds. No wheezing or rales.   Chest:      Chest wall: No tenderness.   Abdominal:      General: Bowel sounds are normal. There is no distension.      Palpations: Abdomen is soft. There is no mass.      Tenderness: There is no abdominal tenderness. There is no guarding or rebound.      Comments: No abdominal tenderness to palpation   Musculoskeletal:         General: No tenderness or deformity. Normal range of motion.      Cervical back: Normal range of motion and neck supple.   Skin:     General: Skin is warm and dry.      Coloration: Skin is not pale.      Findings: Bruising present. No erythema or rash.      Comments: Multiple bilateral bruises on his upper extremities.   Neurological:      General: No focal deficit present.      Mental Status: He is alert and oriented to person, place, and time.   Psychiatric:         Behavior: Behavior normal.         Thought Content: Thought content normal.         Judgment: Judgment normal.     /82 (BP Location: Right arm, Patient Position: Sitting, Cuff Size: Large)   Pulse 87   Temp 97.6 °F (36.4 °C) (Temporal)   Resp 14   Ht 6' 0.99\" (1.854 m)   Wt 106 kg (233 lb)   SpO2 97%   BMI 30.75 kg/m²     No Chest XR results available for this patient.   No CT Chest results available for this patient.  No CT Chest,Abdomen,Pelvis results available for this patient.   No NM PET CT results available for this patient.   No Barium Swallow results available for this patient.    Immanuel Gonsales MD  Thoracic Surgeon    (Available by Tiger Text)        "

## 2024-05-16 NOTE — PROGRESS NOTES
Thoracic Consult  Assessment/Plan:   68-year-old male with a least 4 cm hiatal hernia and severe GERD symptoms with dysphagia with LA grade C esophagitis on endoscopy    I had an open discussion with Cayetano and his wife about his hernia and associated reflux and esophagitis symptoms.  I definitely think his symptoms are related to the presence of a hernia.  He understands this is a structural problem.  Medication alone only covers up any symptoms from his hernia.  His symptoms currently are not controlled on twice daily PPIs.  He understands surgery would be the only definitive fix for this.  Surgery would be a robotic hiatal hernia repair with possible absorbable mesh and partial fundoplication.  I think that is reasonable to entertain at this time.  This would be a 2 to 3-hour surgery and an overnight hospital stay.  He understands recovery would be 2 to 3 weeks.  We would try and wean him off of his acid suppression medication afterwards.  The patient and his wife would like to pursue this as a treatment option.    I will reach out to his medical oncologist to discuss his current regimen for CLL and see about holding this for risk of proceeding with surgery on medication.  We did discuss holding his perioperative heparin given his history of HIT.  We will confirm the surgery date once I speak more with his hematologist oncologist.    Immanuel Livingston      Diagnoses and all orders for this visit:    Gastroesophageal reflux disease with esophagitis, unspecified whether hemorrhage  -     Ambulatory Referral to Thoracic Surgery    Hiatal hernia  -     Ambulatory Referral to Thoracic Surgery            Thoracic History     Problem: Hiatal hernia with GERD and recurrent esophageal ulcers    Procedure:     Pathology:      Subjective:    Patient ID: Cayetano Lucero is a 68 y.o. male.    HPI  Cayetano is a 68-year-old male with history of CLL, coronary artery disease, hypertension, COPD, type 2 diabetes mellitus on insulin,  stage III CKD, history of HIT, and history of TIAs who we are seeing in consultation for a hiatal hernia and symptomatic reflux.  He has had longstanding reflux for over 20 years.  This is also accompanied by dysphagia.  He has had serial endoscopies with dilation.  The last one was a few years ago.  His biggest complaint at this time is dysphagia with trouble getting food through.  Whenever he gets stuck to 90% of the time he can eventually get this to pass with time and liquids but 10% of the time he has to vomit it up.  He does have daily heartburn.  He is dissatisfied with his current condition.  GERD HRQL score was significant 44.    The following portions of the patient's history were reviewed and updated as appropriate: allergies, current medications, past family history, past medical history, past social history, past surgical history and problem list.    Past Medical History:   Diagnosis Date    Allergic     Anemia     Arthritis     CAD (coronary artery disease) 2004    Cancer (HCC)     Leukemia    Cellulitis of scalp     CKD (chronic kidney disease) stage 3, GFR 30-59 ml/min (HCC)     CLL (chronic lymphocytic leukemia) (HCC)     COPD (chronic obstructive pulmonary disease) (HCC)     Diabetes mellitus (HCC)     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    Dyslipidemia     Edema extremities     Esophageal ulcer     H/O blood clots     Hemolytic anemia (HCC)     Hiatal hernia     History of TIA (transient ischemic attack)     Hyperlipidemia     Hypertension     Listeria infection 2018    Multiple gastric ulcers     Myocardial infarction (HCC) 2004    acute    Neutropenia (HCC)     Obese     Recurrent sinusitis     Thrombocytopenia (HCC)     Vertigo       Past Surgical History:   Procedure Laterality Date    ARTHROSCOPIC REPAIR ACL      lt knee    CARDIAC SURGERY      cardiac cath with stent    FOOT SURGERY      IR PORT CHECK  5/17/2019    KNEE ARTHROSCOPY      OTHER SURGICAL HISTORY      cardiac cath lesion 1, 1st adjunct treat  device: stent    PORTACATH PLACEMENT      [place] ESOPHAGOGASTRODUODENOSCOPY TRANSORAL DIAGNOSTIC N/A 10/5/2017    Procedure: ESOPHAGOGASTRODUODENOSCOPY (EGD) with bx;  Surgeon: Winifred Hansen DO;  Location: [facility];  Service: Gastroenterology    OH ESOPHAGOGASTRODUODENOSCOPY TRANSORAL DIAGNOSTIC N/A 3/8/2018    Procedure: ESOPHAGOGASTRODUODENOSCOPY (EGD) with biopsy;  Surgeon: Winifred Hansen DO;  [facility];  Service: Gastroenterology    OH ESOPHAGOGASTRODUODENOSCOPY TRANSORAL DIAGNOSTIC N/A 2018    Procedure: ESOPHAGOGASTRODUODENOSCOPY (EGD) with Dilation;  Surgeon: Winifred Hansen DO;  [facility];  Service: Gastroenterology    OH ESOPHAGOGASTRODUODENOSCOPY TRANSORAL DIAGNOSTIC N/A 10/18/2018    Procedure: ESOPHAGOGASTRODUODENOSCOPY (EGD) with dilation;  Surgeon: Edu Mejía MD;  [facility];  Service: Gastroenterology    OH ESOPHAGOSCOPY FLEXIBLE TRANSORAL WITH BIOPSY N/A 2016    Procedure: ESOPHAGOGASTRODUODENOSCOPY (EGD); ESOPHAGEAL DILATION; ESOPHAGEAL BIOPSY;  Surgeon: Abdi Holcomb MD;  [facility]: [facility];  Service: Thoracic    TONSILLECTOMY      UPPER GASTROINTESTINAL ENDOSCOPY      x 6      Family History   Problem Relation Age of Onset    Leukemia Mother         chronic    Colon polyps Mother         sidmoid    Parkinsonism Father       Social History     Socioeconomic History    Marital status: /Civil Union     Spouse name: Not on file    Number of children: Not on file    Years of education: Not on file    Highest education level: Not on file   Occupational History    Occupation: [place] [employer]    Occupation: retired    Tobacco Use    Smoking status: Former     Current packs/day: 0.00     Average packs/day: 2.0 packs/day for 33.0 years (66.0 ttl pk-yrs)     Types: Cigarettes     Start date:      Quit date: 2011     Years since quittin.3     Passive exposure: Past    Smokeless tobacco: Never   Vaping Use    Vaping status: Never Used   Substance  and Sexual Activity    Alcohol use: Yes     Alcohol/week: 2.0 standard drinks of alcohol     Types: 2 Cans of beer per week     Comment: social use as per [employer]    Drug use: Never    Sexual activity: Not on file   Other Topics Concern    Not on file   Social History Narrative    Not on file     Social Determinants of Health     Financial Resource Strain: Low Risk  (4/4/2024)    Overall Financial Resource Strain (CARDIA)     Difficulty of Paying Living Expenses: Not hard at all   Food Insecurity: Food Insecurity Present (4/4/2024)    Hunger Vital Sign     Worried About Running Out of Food in the Last Year: Sometimes true     Ran Out of Food in the Last Year: Sometimes true   Transportation Needs: No Transportation Needs (4/4/2024)    PRAPARE - Transportation     Lack of Transportation (Medical): No     Lack of Transportation (Non-Medical): No   Physical Activity: Not on file   Stress: Not on file   Social Connections: Not on file   Intimate Partner Violence: Not on file   Housing Stability: Low Risk  (4/4/2024)    Housing Stability Vital Sign     Unable to Pay for Housing in the Last Year: No     Number of Places Lived in the Last Year: 1     Unstable Housing in the Last Year: No      Review of Systems   Constitutional:  Negative for activity change, appetite change, chills, diaphoresis, fatigue, fever and unexpected weight change.   HENT:  Positive for trouble swallowing.    Eyes: Negative.    Respiratory:  Negative for apnea, cough, chest tightness, shortness of breath, wheezing and stridor.    Cardiovascular:  Negative for chest pain, palpitations and leg swelling.   Gastrointestinal:  Positive for abdominal distention and vomiting. Negative for abdominal pain, blood in stool, constipation, diarrhea and nausea.   Endocrine: Negative.    Genitourinary: Negative.    Musculoskeletal: Negative.    Skin: Negative.    Allergic/Immunologic: Negative.    Neurological: Negative.    Hematological:  Bruises/bleeds  easily.   Psychiatric/Behavioral: Negative.           Objective:   Physical Exam  Vitals and nursing note reviewed.   Constitutional:       General: He is not in acute distress.     Appearance: Normal appearance. He is well-developed. He is not diaphoretic.   HENT:      Head: Normocephalic and atraumatic.      Mouth/Throat:      Pharynx: No oropharyngeal exudate.   Eyes:      General: No scleral icterus.     Conjunctiva/sclera: Conjunctivae normal.      Pupils: Pupils are equal, round, and reactive to light.   Neck:      Thyroid: No thyromegaly.      Vascular: No JVD.      Trachea: No tracheal deviation.   Cardiovascular:      Rate and Rhythm: Normal rate and regular rhythm.      Heart sounds: Normal heart sounds. No murmur heard.     No friction rub. No gallop.   Pulmonary:      Effort: Pulmonary effort is normal. No respiratory distress.      Breath sounds: Normal breath sounds. No wheezing or rales.   Chest:      Chest wall: No tenderness.   Abdominal:      General: Bowel sounds are normal. There is no distension.      Palpations: Abdomen is soft. There is no mass.      Tenderness: There is no abdominal tenderness. There is no guarding or rebound.      Comments: No abdominal tenderness to palpation   Musculoskeletal:         General: No tenderness or deformity. Normal range of motion.      Cervical back: Normal range of motion and neck supple.   Skin:     General: Skin is warm and dry.      Coloration: Skin is not pale.      Findings: Bruising present. No erythema or rash.      Comments: Multiple bilateral bruises on his upper extremities.   Neurological:      General: No focal deficit present.      Mental Status: He is alert and oriented to person, place, and time.   Psychiatric:         Behavior: Behavior normal.         Thought Content: Thought content normal.         Judgment: Judgment normal.     /82 (BP Location: Right arm, Patient Position: Sitting, Cuff Size: Large)   Pulse 87   Temp 97.6 °F (36.4  "°C) (Temporal)   Resp 14   Ht 6' 0.99\" (1.854 m)   Wt 106 kg (233 lb)   SpO2 97%   BMI 30.75 kg/m²       I personally reviewed his previous imaging and workup.  Last EGD on 4/24/2024 showed severe grade C esophagitis and at least a 4 cm hiatal hernia with a Hill grade 1 GE junction.  He appears to have fungal esophagitis.  No evidence of malignancy.    I reviewed CT imaging from 2022.  This showed a small hiatal hernia with the GE junction in the chest.    Esophageal manometry from 5/2/2024 was reviewed.  This showed normal esophageal motility with a mean DCI of 1376.  IRP was normal.  This measured a 4.7 cm hiatal hernia.    Immanuel Gonsales MD  Thoracic Surgeon    (Available by Tiger Text)        "

## 2024-05-16 NOTE — H&P (VIEW-ONLY)
Thoracic Consult  Assessment/Plan:   68-year-old male with a least 4 cm hiatal hernia and severe GERD symptoms with dysphagia with LA grade C esophagitis on endoscopy    I had an open discussion with Cayetano and his wife about his hernia and associated reflux and esophagitis symptoms.  I definitely think his symptoms are related to the presence of a hernia.  He understands this is a structural problem.  Medication alone only covers up any symptoms from his hernia.  His symptoms currently are not controlled on twice daily PPIs.  He understands surgery would be the only definitive fix for this.  Surgery would be a robotic hiatal hernia repair with possible absorbable mesh and partial fundoplication.  I think that is reasonable to entertain at this time.  This would be a 2 to 3-hour surgery and an overnight hospital stay.  He understands recovery would be 2 to 3 weeks.  We would try and wean him off of his acid suppression medication afterwards.  The patient and his wife would like to pursue this as a treatment option.    I will reach out to his medical oncologist to discuss his current regimen for CLL and see about holding this for risk of proceeding with surgery on medication.  We did discuss holding his perioperative heparin given his history of HIT.  We will confirm the surgery date once I speak more with his hematologist oncologist.    Immanuel Conway      Diagnoses and all orders for this visit:    Gastroesophageal reflux disease with esophagitis, unspecified whether hemorrhage  -     Ambulatory Referral to Thoracic Surgery    Hiatal hernia  -     Ambulatory Referral to Thoracic Surgery            Thoracic History     Problem: Hiatal hernia with GERD and recurrent esophageal ulcers    Procedure:     Pathology:      Subjective:    Patient ID: Cayetano Lucero is a 68 y.o. male.    HPI  Cayetano is a 68-year-old male with history of CLL, coronary artery disease, hypertension, COPD, type 2 diabetes mellitus on insulin,  stage III CKD, history of HIT, and history of TIAs who we are seeing in consultation for a hiatal hernia and symptomatic reflux.  He has had longstanding reflux for over 20 years.  This is also accompanied by dysphagia.  He has had serial endoscopies with dilation.  The last one was a few years ago.  His biggest complaint at this time is dysphagia with trouble getting food through.  Whenever he gets stuck to 90% of the time he can eventually get this to pass with time and liquids but 10% of the time he has to vomit it up.  He does have daily heartburn.  He is dissatisfied with his current condition.  GERD HRQL score was significant 44.    The following portions of the patient's history were reviewed and updated as appropriate: allergies, current medications, past family history, past medical history, past social history, past surgical history and problem list.    Past Medical History:   Diagnosis Date    Allergic     Anemia     Arthritis     CAD (coronary artery disease) 2004    Cancer (HCC)     Leukemia    Cellulitis of scalp     CKD (chronic kidney disease) stage 3, GFR 30-59 ml/min (HCC)     CLL (chronic lymphocytic leukemia) (HCC)     COPD (chronic obstructive pulmonary disease) (HCC)     Diabetes mellitus (HCC)     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    Dyslipidemia     Edema extremities     Esophageal ulcer     H/O blood clots     Hemolytic anemia (HCC)     Hiatal hernia     History of TIA (transient ischemic attack)     Hyperlipidemia     Hypertension     Listeria infection 2018    Multiple gastric ulcers     Myocardial infarction (HCC) 2004    acute    Neutropenia (HCC)     Obese     Recurrent sinusitis     Thrombocytopenia (HCC)     Vertigo       Past Surgical History:   Procedure Laterality Date    ARTHROSCOPIC REPAIR ACL      lt knee    CARDIAC SURGERY      cardiac cath with stent    FOOT SURGERY      IR PORT CHECK  5/17/2019    KNEE ARTHROSCOPY      OTHER SURGICAL HISTORY      cardiac cath lesion 1, 1st adjunct treat  device: stent    PORTACATH PLACEMENT      OR ESOPHAGOGASTRODUODENOSCOPY TRANSORAL DIAGNOSTIC N/A 10/5/2017    Procedure: ESOPHAGOGASTRODUODENOSCOPY (EGD) with bx;  Surgeon: Winifred Hansen DO;  Location: [facility];  Service: Gastroenterology    OR ESOPHAGOGASTRODUODENOSCOPY TRANSORAL DIAGNOSTIC N/A 3/8/2018    Procedure: ESOPHAGOGASTRODUODENOSCOPY (EGD) with biopsy;  Surgeon: Winifred Hansen DO;  Location: [facility];  Service: Gastroenterology    OR ESOPHAGOGASTRODUODENOSCOPY TRANSORAL DIAGNOSTIC N/A 2018    Procedure: ESOPHAGOGASTRODUODENOSCOPY (EGD) with Dilation;  Surgeon: Winifred Hansen DO;  Location: [facility];  Service: Gastroenterology    OR ESOPHAGOGASTRODUODENOSCOPY TRANSORAL DIAGNOSTIC N/A 10/18/2018    Procedure: ESOPHAGOGASTRODUODENOSCOPY (EGD) with dilation;  Surgeon: Edu Mejía MD;  Location: [facility];  Service: Gastroenterology    OR ESOPHAGOSCOPY FLEXIBLE TRANSORAL WITH BIOPSY N/A 2016    Procedure: ESOPHAGOGASTRODUODENOSCOPY (EGD); ESOPHAGEAL DILATION; ESOPHAGEAL BIOPSY;  Surgeon: Abdi Holcomb MD;  Location: [facility];  Service: Thoracic    TONSILLECTOMY      UPPER GASTROINTESTINAL ENDOSCOPY      x 6      Family History   Problem Relation Age of Onset    Leukemia Mother         chronic    Colon polyps Mother         sidmoid    Parkinsonism Father       Social History     Socioeconomic History    Marital status: /Civil Union     Spouse name: Not on file    Number of children: Not on file    Years of education: Not on file    Highest education level: Not on file   Occupational History    Occupation: [employer]    Occupation: retired    Tobacco Use    Smoking status: Former     Current packs/day: 0.00     Average packs/day: 2.0 packs/day for 33.0 years (66.0 ttl pk-yrs)     Types: Cigarettes     Start date:      Quit date: 2011     Years since quittin.3     Passive exposure: Past    Smokeless tobacco: Never   Vaping Use    Vaping status: Never Used   Substance  and Sexual Activity    Alcohol use: Yes     Alcohol/week: 2.0 standard drinks of alcohol     Types: 2 Cans of beer per week     Comment: social use as per [employer]    Drug use: Never    Sexual activity: Not on file   Other Topics Concern    Not on file   Social History Narrative    Not on file     Social Determinants of Health     Financial Resource Strain: Low Risk  (4/4/2024)    Overall Financial Resource Strain (CARDIA)     Difficulty of Paying Living Expenses: Not hard at all   Food Insecurity: Food Insecurity Present (4/4/2024)    Hunger Vital Sign     Worried About Running Out of Food in the Last Year: Sometimes true     Ran Out of Food in the Last Year: Sometimes true   Transportation Needs: No Transportation Needs (4/4/2024)    PRAPARE - Transportation     Lack of Transportation (Medical): No     Lack of Transportation (Non-Medical): No   Physical Activity: Not on file   Stress: Not on file   Social Connections: Not on file   Intimate Partner Violence: Not on file   Housing Stability: Low Risk  (4/4/2024)    Housing Stability Vital Sign     Unable to Pay for Housing in the Last Year: No     Number of Places Lived in the Last Year: 1     Unstable Housing in the Last Year: No      Review of Systems   Constitutional:  Negative for activity change, appetite change, chills, diaphoresis, fatigue, fever and unexpected weight change.   HENT:  Positive for trouble swallowing.    Eyes: Negative.    Respiratory:  Negative for apnea, cough, chest tightness, shortness of breath, wheezing and stridor.    Cardiovascular:  Negative for chest pain, palpitations and leg swelling.   Gastrointestinal:  Positive for abdominal distention and vomiting. Negative for abdominal pain, blood in stool, constipation, diarrhea and nausea.   Endocrine: Negative.    Genitourinary: Negative.    Musculoskeletal: Negative.    Skin: Negative.    Allergic/Immunologic: Negative.    Neurological: Negative.    Hematological:  Bruises/bleeds  easily.   Psychiatric/Behavioral: Negative.           Objective:   Physical Exam  Vitals and nursing note reviewed.   Constitutional:       General: He is not in acute distress.     Appearance: Normal appearance. He is well-developed. He is not diaphoretic.   HENT:      Head: Normocephalic and atraumatic.      Mouth/Throat:      Pharynx: No oropharyngeal exudate.   Eyes:      General: No scleral icterus.     Conjunctiva/sclera: Conjunctivae normal.      Pupils: Pupils are equal, round, and reactive to light.   Neck:      Thyroid: No thyromegaly.      Vascular: No JVD.      Trachea: No tracheal deviation.   Cardiovascular:      Rate and Rhythm: Normal rate and regular rhythm.      Heart sounds: Normal heart sounds. No murmur heard.     No friction rub. No gallop.   Pulmonary:      Effort: Pulmonary effort is normal. No respiratory distress.      Breath sounds: Normal breath sounds. No wheezing or rales.   Chest:      Chest wall: No tenderness.   Abdominal:      General: Bowel sounds are normal. There is no distension.      Palpations: Abdomen is soft. There is no mass.      Tenderness: There is no abdominal tenderness. There is no guarding or rebound.      Comments: No abdominal tenderness to palpation   Musculoskeletal:         General: No tenderness or deformity. Normal range of motion.      Cervical back: Normal range of motion and neck supple.   Skin:     General: Skin is warm and dry.      Coloration: Skin is not pale.      Findings: Bruising present. No erythema or rash.      Comments: Multiple bilateral bruises on his upper extremities.   Neurological:      General: No focal deficit present.      Mental Status: He is alert and oriented to person, place, and time.   Psychiatric:         Behavior: Behavior normal.         Thought Content: Thought content normal.         Judgment: Judgment normal.     /82 (BP Location: Right arm, Patient Position: Sitting, Cuff Size: Large)   Pulse 87   Temp 97.6 °F (36.4  "°C) (Temporal)   Resp 14   Ht 6' 0.99\" (1.854 m)   Wt 106 kg (233 lb)   SpO2 97%   BMI 30.75 kg/m²       I personally reviewed his previous imaging and workup.  Last EGD on 4/24/2024 showed severe grade C esophagitis and at least a 4 cm hiatal hernia with a Hill grade 1 GE junction.  He appears to have fungal esophagitis.  No evidence of malignancy.    I reviewed CT imaging from 2022.  This showed a small hiatal hernia with the GE junction in the chest.    Esophageal manometry from 5/2/2024 was reviewed.  This showed normal esophageal motility with a mean DCI of 1376.  IRP was normal.  This measured a 4.7 cm hiatal hernia.    Immanuel Gonsales MD  Thoracic Surgeon    (Available by [employer] Text)        "

## 2024-05-22 ENCOUNTER — PREP FOR PROCEDURE (OUTPATIENT)
Dept: CARDIAC SURGERY | Facility: CLINIC | Age: 69
End: 2024-05-22

## 2024-05-22 ENCOUNTER — TELEPHONE (OUTPATIENT)
Dept: CARDIAC SURGERY | Facility: CLINIC | Age: 69
End: 2024-05-22

## 2024-05-22 DIAGNOSIS — K21.9 PARAESOPHAGEAL HERNIA WITH GASTROESOPHAGEAL REFLUX: Primary | ICD-10-CM

## 2024-05-22 DIAGNOSIS — K44.9 PARAESOPHAGEAL HERNIA WITH GASTROESOPHAGEAL REFLUX: Primary | ICD-10-CM

## 2024-05-22 RX ORDER — ACETAMINOPHEN 325 MG/1
975 TABLET ORAL ONCE
OUTPATIENT
Start: 2024-05-22 | End: 2024-05-22

## 2024-05-22 RX ORDER — CEFAZOLIN SODIUM 2 G/50ML
2000 SOLUTION INTRAVENOUS ONCE
OUTPATIENT
Start: 2024-05-22 | End: 2024-05-22

## 2024-05-22 RX ORDER — METRONIDAZOLE 500 MG/100ML
500 INJECTION, SOLUTION INTRAVENOUS ONCE
OUTPATIENT
Start: 2024-05-22 | End: 2024-05-22

## 2024-05-22 NOTE — TELEPHONE ENCOUNTER
I reviewed Cayetano's case with his oncologist Dr. Mejia.  He recommended holding his ibrutinib 3 days before and 7 days after the procedure.  We will schedule his surgery for Friday, June 7.  His next infusion is May 28.  That should not affect surgery on the seventh.  Afterwards Dr. Mejia recommended again holding for 7 days so that would affect his next infusion on June 11.  We will contact his office to delay that    My office will reach out to finalize surgical plans but again will proceed with surgery for a robotic hernia repair on Friday, June 7    [facility]

## 2024-05-23 ENCOUNTER — TELEPHONE (OUTPATIENT)
Dept: CARDIAC SURGERY | Facility: CLINIC | Age: 69
End: 2024-05-23

## 2024-05-23 NOTE — TELEPHONE ENCOUNTER
I called Cayetano and I spoke with him. We discussed all surgery information to prepare for his surgery scheduled on 6/7 with Dr. Gonsales. I answered his questions. He is aware that he will have an EKG on admission prior to his surgery on the seventh. I provided my contact information as I will be his point of contact moving forward for anything relating to his surgery. Cayetano voiced appreciation and understanding from our conversation.

## 2024-05-24 ENCOUNTER — ANESTHESIA EVENT (OUTPATIENT)
Dept: PERIOP | Facility: HOSPITAL | Age: 69
End: 2024-05-24
Payer: MEDICARE

## 2024-05-24 DIAGNOSIS — Z01.818 PRE-OP TESTING: Primary | ICD-10-CM

## 2024-05-24 RX ORDER — POSACONAZOLE 40 MG/ML
SUSPENSION ORAL DAILY
COMMUNITY

## 2024-05-24 NOTE — PRE-PROCEDURE INSTRUCTIONS
Pre-Surgery Instructions:   Medication Instructions    acyclovir (ZOVIRAX) 400 MG tablet Take as directed    albuterol (2.5 mg/3 mL) 0.083 % nebulizer solution Uses PRN- OK to take day of surgery    amLODIPine (NORVASC) 10 mg tablet Take day of surgery.    aspirin 81 MG tablet Instructions provided by MD    citalopram (CeleXA) 40 mg tablet Take as directed    Diclofenac Sodium (VOLTAREN) 1 % Hold day of surgery.    fenofibrate (TRICOR) 145 mg tablet Take as directed    folic acid (FOLVITE) 1 mg tablet Take as directed    gabapentin (NEURONTIN) 600 MG tablet Take as directed    Ibrutinib 140 MG TABS Hold 3 days prior to surgery as instructed by hematology onc    insulin degludec (Tresiba FlexTouch) 100 units/mL injection pen Take regular dose evening prior to surgery    insulin lispro protamine-insulin lispro (HumaLOG 75/25) 100 units/mL Take half regular dose evening prior to procedure and hold morning of procedure    LORazepam (ATIVAN) 0.5 mg tablet Uses PRN- OK to take day of surgery    losartan (COZAAR) 100 MG tablet Hold day of surgery.    mometasone-formoterol (Dulera) 200-5 MCG/ACT inhaler Uses PRN- OK to take day of surgery    multivitamin (THERAGRAN) TABS Stop taking 7 days prior to surgery.    oxyCODONE (ROXICODONE) 10 MG TABS PRN    posaconazole (NOXAFIL) 40 mg/mL suspension Instructions provided by MD    predniSONE 5 mg tablet Take as directed    Ventolin  (90 Base) MCG/ACT inhaler Uses PRN- OK to take day of surgery    Vonoprazan Fumarate 20 MG TABS Take as directed      Medication instructions for day surgery reviewed. Please use only a sip of water to take your instructed medications. Avoid all over the counter vitamins, supplements and NSAIDS for one week prior to surgery per anesthesia guidelines. Tylenol is ok to take as needed.     You will receive a call one business day prior to surgery with an arrival time and hospital directions. If your surgery is scheduled on a Monday, the hospital  will be calling you on the Friday prior to your surgery. If you have not heard from anyone by 8pm, please call the hospital supervisor through the hospital  at [phone]. ([place] [phone] or [place] [phone]).    Do not eat or drink anything after midnight the night before your surgery, including candy, mints, lifesavers, or chewing gum. Do not drink alcohol 24hrs before your surgery. Try not to smoke at least 24hrs before your surgery.       Follow the pre surgery showering instructions as listed in the “My Surgical Experience Booklet” or otherwise provided by your surgeon's office. Do not use a blade to shave the surgical area 1 week before surgery. It is okay to use a clean electric clippers up to 24 hours before surgery. Do not apply any lotions, creams, including makeup, cologne, deodorant, or perfumes after showering on the day of your surgery. Do not use dry shampoo, hair spray, hair gel, or any type of hair products.     No contact lenses, eye make-up, or artificial eyelashes. Remove nail polish, including gel polish, and any artificial, gel, or acrylic nails if possible. Remove all jewelry including rings and body piercing jewelry.     Wear causal clothing that is easy to take on and off. Consider your type of surgery.    Keep any valuables, jewelry, piercings at home. Please bring any specially ordered equipment (sling, braces) if indicated.    Arrange for a responsible person to drive you to and from the hospital on the day of your surgery. Please confirm the visitor policy for the day of your procedure when you receive your phone call with an arrival time.     Call the surgeon's office with any new illnesses, exposures, or additional questions prior to surgery.    Please reference your “My Surgical Experience Booklet” for additional information to prepare for your upcoming surgery.

## 2024-05-27 DIAGNOSIS — I10 ESSENTIAL HYPERTENSION: ICD-10-CM

## 2024-05-28 ENCOUNTER — TELEPHONE (OUTPATIENT)
Dept: HEMATOLOGY ONCOLOGY | Facility: CLINIC | Age: 69
End: 2024-05-28

## 2024-05-28 RX ORDER — LOSARTAN POTASSIUM 100 MG/1
TABLET ORAL
Qty: 90 TABLET | Refills: 1 | Status: SHIPPED | OUTPATIENT
Start: 2024-05-28

## 2024-05-28 NOTE — TELEPHONE ENCOUNTER
Any stop/start instructions on imbruvica before the following?    REPAIR HERNIA PARAESOPHAGEAL LAPAROSCOPIC W ROBOTICS (Abdomen)  ESOPHAGOGASTRODUODENOSCOPY (EGD) (Esophagus)

## 2024-05-28 NOTE — TELEPHONE ENCOUNTER
Patient Call    Who are you speaking with? Patient    If it is not the patient, are they listed on an active communication consent form? Yes   What is the reason for this call? What medications to start/stop for surgery?   Does this require a call back? Yes   If a call back is required, please list [facility] call back number [phone]    If a call back is required, advise that a message will be forwarded to their care team and someone will return their call as soon as possible.   Did you relay this information to the patient? Yes

## 2024-05-28 NOTE — TELEPHONE ENCOUNTER
Patient Call    Who are you speaking with? Patient    If it is not the patient, are they listed on an active communication consent form? N/A   What is the reason for this call? Patient calling in regards to his upcoming surgery with Dr Gonsales.  Patient would like to know if he needs to stop his Imbruvica medication.  Patient would like a call back to discuss.    Does this require a call back? Yes   If a call back is required, please list best call back number [phone]   If a call back is required, advise that a message will be forwarded to their care team and someone will return their call as soon as possible.   Did you relay this information to the patient? Yes

## 2024-05-28 NOTE — TELEPHONE ENCOUNTER
Spoke with patient. Hold imbruvica for 7 days before the surgery, and 7 days after surgery. Patient understands.     Thoracic team - please see above.

## 2024-05-29 ENCOUNTER — HOSPITAL ENCOUNTER (OUTPATIENT)
Dept: INFUSION CENTER | Facility: CLINIC | Age: 69
Discharge: HOME | End: 2024-05-29
Payer: MEDICARE

## 2024-05-29 VITALS
WEIGHT: 230.6 LBS | SYSTOLIC BLOOD PRESSURE: 146 MMHG | TEMPERATURE: 97.8 F | RESPIRATION RATE: 18 BRPM | DIASTOLIC BLOOD PRESSURE: 81 MMHG | HEART RATE: 81 BPM | BODY MASS INDEX: 30.43 KG/M2

## 2024-05-29 DIAGNOSIS — D80.1 HYPOGAMMAGLOBULINEMIA, ACQUIRED (HCC): Primary | ICD-10-CM

## 2024-05-29 DIAGNOSIS — C91.10 CLL (CHRONIC LYMPHOCYTIC LEUKEMIA) (HCC): ICD-10-CM

## 2024-05-29 RX ORDER — SODIUM CHLORIDE 9 MG/ML
20 INJECTION, SOLUTION INTRAVENOUS ONCE
Status: COMPLETED | OUTPATIENT
Start: 2024-05-29 | End: 2024-05-29

## 2024-05-29 RX ORDER — SODIUM CHLORIDE 9 MG/ML
20 INJECTION, SOLUTION INTRAVENOUS ONCE
OUTPATIENT
Start: 2024-06-25

## 2024-05-29 RX ORDER — ACETAMINOPHEN 325 MG/1
650 TABLET ORAL ONCE
OUTPATIENT
Start: 2024-06-25

## 2024-05-29 RX ORDER — ACETAMINOPHEN 325 MG/1
650 TABLET ORAL ONCE
Status: COMPLETED | OUTPATIENT
Start: 2024-05-29 | End: 2024-05-29

## 2024-05-29 RX ADMIN — DEXAMETHASONE SODIUM PHOSPHATE 4 MG: 100 INJECTION INTRAMUSCULAR; INTRAVENOUS at 08:45

## 2024-05-29 RX ADMIN — ACETAMINOPHEN 650 MG: 325 TABLET ORAL at 08:23

## 2024-05-29 RX ADMIN — Medication 20 G: at 09:23

## 2024-05-29 RX ADMIN — SODIUM CHLORIDE 20 ML/HR: 0.9 INJECTION, SOLUTION INTRAVENOUS at 08:22

## 2024-05-29 RX ADMIN — DIPHENHYDRAMINE HYDROCHLORIDE 12.5 MG: 50 INJECTION, SOLUTION INTRAMUSCULAR; INTRAVENOUS at 08:23

## 2024-05-29 NOTE — PROGRESS NOTES
Pt arrives with no new complaints.  Has surgery coming up next Friday 6-7 for a hernia.  Tolerated IGG today without complications. Confirmed next appt 6-11-24 8:00 for gazyva, AVS declined

## 2024-06-01 DIAGNOSIS — R76.8 LOW SERUM IGG FOR AGE: ICD-10-CM

## 2024-06-01 DIAGNOSIS — D80.1 HYPOGAMMAGLOBULINEMIA, ACQUIRED (HCC): ICD-10-CM

## 2024-06-01 DIAGNOSIS — C91.10 CLL (CHRONIC LYMPHOCYTIC LEUKEMIA) (HCC): Primary | ICD-10-CM

## 2024-06-05 ENCOUNTER — APPOINTMENT (OUTPATIENT)
Dept: LAB | Facility: CLINIC | Age: 69
End: 2024-06-05
Payer: MEDICARE

## 2024-06-05 ENCOUNTER — APPOINTMENT (OUTPATIENT)
Dept: LAB | Facility: HOSPITAL | Age: 69
End: 2024-06-05
Payer: MEDICARE

## 2024-06-05 ENCOUNTER — LAB REQUISITION (OUTPATIENT)
Dept: LAB | Facility: HOSPITAL | Age: 69
End: 2024-06-05
Payer: MEDICARE

## 2024-06-05 DIAGNOSIS — Z01.811 PRE-OP CHEST EXAM: ICD-10-CM

## 2024-06-05 DIAGNOSIS — Z01.811 ENCOUNTER FOR PREPROCEDURAL RESPIRATORY EXAMINATION: ICD-10-CM

## 2024-06-05 DIAGNOSIS — K21.9 PARAESOPHAGEAL HERNIA WITH GASTROESOPHAGEAL REFLUX: ICD-10-CM

## 2024-06-05 DIAGNOSIS — K44.9 PARAESOPHAGEAL HERNIA WITH GASTROESOPHAGEAL REFLUX: ICD-10-CM

## 2024-06-05 LAB
ABO GROUP BLD: NORMAL
ATRIAL RATE: 78 BPM
BLD GP AB SCN SERPL QL: NEGATIVE
P AXIS: 17 DEGREES
PR INTERVAL: 152 MS
QRS AXIS: -36 DEGREES
QRSD INTERVAL: 84 MS
QT INTERVAL: 390 MS
QTC INTERVAL: 444 MS
RH BLD: POSITIVE
SPECIMEN EXPIRATION DATE: NORMAL
T WAVE AXIS: 14 DEGREES
VENTRICULAR RATE: 78 BPM

## 2024-06-05 PROCEDURE — 86901 BLOOD TYPING SEROLOGIC RH(D): CPT | Performed by: THORACIC SURGERY (CARDIOTHORACIC VASCULAR SURGERY)

## 2024-06-05 PROCEDURE — 86850 RBC ANTIBODY SCREEN: CPT | Performed by: THORACIC SURGERY (CARDIOTHORACIC VASCULAR SURGERY)

## 2024-06-05 PROCEDURE — 93005 ELECTROCARDIOGRAM TRACING: CPT

## 2024-06-05 PROCEDURE — 86900 BLOOD TYPING SEROLOGIC ABO: CPT | Performed by: THORACIC SURGERY (CARDIOTHORACIC VASCULAR SURGERY)

## 2024-06-05 PROCEDURE — 36415 COLL VENOUS BLD VENIPUNCTURE: CPT

## 2024-06-05 PROCEDURE — 93010 ELECTROCARDIOGRAM REPORT: CPT

## 2024-06-07 ENCOUNTER — APPOINTMENT (OUTPATIENT)
Dept: RADIOLOGY | Facility: HOSPITAL | Age: 69
End: 2024-06-07
Payer: MEDICARE

## 2024-06-07 ENCOUNTER — ANESTHESIA (OUTPATIENT)
Dept: PERIOP | Facility: HOSPITAL | Age: 69
End: 2024-06-07
Payer: MEDICARE

## 2024-06-07 ENCOUNTER — HOSPITAL ENCOUNTER (OUTPATIENT)
Facility: HOSPITAL | Age: 69
Setting detail: OUTPATIENT SURGERY
Discharge: HOME | End: 2024-06-08
Attending: THORACIC SURGERY (CARDIOTHORACIC VASCULAR SURGERY) | Admitting: THORACIC SURGERY (CARDIOTHORACIC VASCULAR SURGERY)
Payer: MEDICARE

## 2024-06-07 DIAGNOSIS — K21.9 GASTROESOPHAGEAL REFLUX DISEASE WITHOUT ESOPHAGITIS: ICD-10-CM

## 2024-06-07 DIAGNOSIS — K22.10 ULCER OF ESOPHAGUS WITHOUT BLEEDING: Primary | ICD-10-CM

## 2024-06-07 DIAGNOSIS — K21.9 PARAESOPHAGEAL HERNIA WITH GASTROESOPHAGEAL REFLUX: ICD-10-CM

## 2024-06-07 DIAGNOSIS — K44.9 PARAESOPHAGEAL HERNIA WITH GASTROESOPHAGEAL REFLUX: ICD-10-CM

## 2024-06-07 LAB
GLUCOSE SERPL-MCNC: 127 MG/DL (ref 65–140)
GLUCOSE SERPL-MCNC: 143 MG/DL (ref 65–140)
GLUCOSE SERPL-MCNC: 185 MG/DL (ref 65–140)

## 2024-06-07 PROCEDURE — S2900 ROBOTIC SURGICAL SYSTEM: HCPCS | Performed by: THORACIC SURGERY (CARDIOTHORACIC VASCULAR SURGERY)

## 2024-06-07 PROCEDURE — 88305 TISSUE EXAM BY PATHOLOGIST: CPT | Performed by: PATHOLOGY

## 2024-06-07 PROCEDURE — C1781 MESH (IMPLANTABLE): HCPCS | Performed by: THORACIC SURGERY (CARDIOTHORACIC VASCULAR SURGERY)

## 2024-06-07 PROCEDURE — 43282 LAP PARAESOPH HER RPR W/MESH: CPT | Performed by: THORACIC SURGERY (CARDIOTHORACIC VASCULAR SURGERY)

## 2024-06-07 PROCEDURE — 71045 X-RAY EXAM CHEST 1 VIEW: CPT

## 2024-06-07 PROCEDURE — 82948 REAGENT STRIP/BLOOD GLUCOSE: CPT

## 2024-06-07 PROCEDURE — NC001 PR NO CHARGE: Performed by: PHYSICIAN ASSISTANT

## 2024-06-07 DEVICE — MESH BIO-A MESH GORE: Type: IMPLANTABLE DEVICE | Site: ABDOMEN | Status: FUNCTIONAL

## 2024-06-07 RX ORDER — ALBUTEROL SULFATE 90 UG/1
2 AEROSOL, METERED RESPIRATORY (INHALATION) EVERY 6 HOURS PRN
Status: DISCONTINUED | OUTPATIENT
Start: 2024-06-07 | End: 2024-06-08 | Stop reason: HOSPADM

## 2024-06-07 RX ORDER — ROCURONIUM BROMIDE 10 MG/ML
INJECTION, SOLUTION INTRAVENOUS AS NEEDED
Status: DISCONTINUED | OUTPATIENT
Start: 2024-06-07 | End: 2024-06-07

## 2024-06-07 RX ORDER — MIDAZOLAM HYDROCHLORIDE 2 MG/2ML
INJECTION, SOLUTION INTRAMUSCULAR; INTRAVENOUS AS NEEDED
Status: DISCONTINUED | OUTPATIENT
Start: 2024-06-07 | End: 2024-06-07

## 2024-06-07 RX ORDER — GABAPENTIN 300 MG/1
600 CAPSULE ORAL DAILY
Status: DISCONTINUED | OUTPATIENT
Start: 2024-06-08 | End: 2024-06-08 | Stop reason: HOSPADM

## 2024-06-07 RX ORDER — OXYCODONE HYDROCHLORIDE 5 MG/1
5 TABLET ORAL EVERY 4 HOURS PRN
Status: DISCONTINUED | OUTPATIENT
Start: 2024-06-07 | End: 2024-06-08 | Stop reason: HOSPADM

## 2024-06-07 RX ORDER — PANTOPRAZOLE SODIUM 40 MG/10ML
40 INJECTION, POWDER, LYOPHILIZED, FOR SOLUTION INTRAVENOUS DAILY
Status: DISCONTINUED | OUTPATIENT
Start: 2024-06-08 | End: 2024-06-08 | Stop reason: HOSPADM

## 2024-06-07 RX ORDER — OXYCODONE HYDROCHLORIDE 10 MG/1
10 TABLET ORAL EVERY 4 HOURS PRN
Status: DISCONTINUED | OUTPATIENT
Start: 2024-06-07 | End: 2024-06-08 | Stop reason: HOSPADM

## 2024-06-07 RX ORDER — BUPIVACAINE HYDROCHLORIDE 2.5 MG/ML
INJECTION, SOLUTION EPIDURAL; INFILTRATION; INTRACAUDAL AS NEEDED
Status: DISCONTINUED | OUTPATIENT
Start: 2024-06-07 | End: 2024-06-07 | Stop reason: HOSPADM

## 2024-06-07 RX ORDER — PREDNISONE 5 MG/1
5 TABLET ORAL DAILY
Status: DISCONTINUED | OUTPATIENT
Start: 2024-06-08 | End: 2024-06-08 | Stop reason: HOSPADM

## 2024-06-07 RX ORDER — ONDANSETRON 2 MG/ML
4 INJECTION INTRAMUSCULAR; INTRAVENOUS ONCE AS NEEDED
Status: DISCONTINUED | OUTPATIENT
Start: 2024-06-07 | End: 2024-06-07 | Stop reason: HOSPADM

## 2024-06-07 RX ORDER — INSULIN LISPRO 100 [IU]/ML
1-6 INJECTION, SOLUTION INTRAVENOUS; SUBCUTANEOUS
Status: DISCONTINUED | OUTPATIENT
Start: 2024-06-07 | End: 2024-06-08

## 2024-06-07 RX ORDER — POLYETHYLENE GLYCOL 3350 17 G/17G
17 POWDER, FOR SOLUTION ORAL DAILY
Status: DISCONTINUED | OUTPATIENT
Start: 2024-06-08 | End: 2024-06-08 | Stop reason: HOSPADM

## 2024-06-07 RX ORDER — LORAZEPAM 0.5 MG/1
0.5 TABLET ORAL DAILY PRN
Status: DISCONTINUED | OUTPATIENT
Start: 2024-06-07 | End: 2024-06-08 | Stop reason: HOSPADM

## 2024-06-07 RX ORDER — ACETAMINOPHEN 325 MG/1
975 TABLET ORAL EVERY 6 HOURS
Status: DISCONTINUED | OUTPATIENT
Start: 2024-06-07 | End: 2024-06-08 | Stop reason: HOSPADM

## 2024-06-07 RX ORDER — SODIUM CHLORIDE, SODIUM LACTATE, POTASSIUM CHLORIDE, CALCIUM CHLORIDE 600; 310; 30; 20 MG/100ML; MG/100ML; MG/100ML; MG/100ML
INJECTION, SOLUTION INTRAVENOUS CONTINUOUS PRN
Status: DISCONTINUED | OUTPATIENT
Start: 2024-06-07 | End: 2024-06-07

## 2024-06-07 RX ORDER — METRONIDAZOLE 500 MG/100ML
500 INJECTION, SOLUTION INTRAVENOUS ONCE
Status: COMPLETED | OUTPATIENT
Start: 2024-06-07 | End: 2024-06-07

## 2024-06-07 RX ORDER — HYDROMORPHONE HCL/PF 1 MG/ML
0.5 SYRINGE (ML) INJECTION
Status: DISCONTINUED | OUTPATIENT
Start: 2024-06-07 | End: 2024-06-08 | Stop reason: HOSPADM

## 2024-06-07 RX ORDER — SENNOSIDES 8.6 MG
1 TABLET ORAL DAILY
Status: DISCONTINUED | OUTPATIENT
Start: 2024-06-08 | End: 2024-06-08 | Stop reason: HOSPADM

## 2024-06-07 RX ORDER — DIPHENHYDRAMINE HYDROCHLORIDE 50 MG/ML
12.5 INJECTION INTRAMUSCULAR; INTRAVENOUS ONCE AS NEEDED
Status: DISCONTINUED | OUTPATIENT
Start: 2024-06-07 | End: 2024-06-07 | Stop reason: HOSPADM

## 2024-06-07 RX ORDER — LABETALOL HYDROCHLORIDE 5 MG/ML
INJECTION, SOLUTION INTRAVENOUS AS NEEDED
Status: DISCONTINUED | OUTPATIENT
Start: 2024-06-07 | End: 2024-06-07

## 2024-06-07 RX ORDER — FENTANYL CITRATE/PF 50 MCG/ML
50 SYRINGE (ML) INJECTION
Status: COMPLETED | OUTPATIENT
Start: 2024-06-07 | End: 2024-06-07

## 2024-06-07 RX ORDER — SODIUM CHLORIDE, SODIUM LACTATE, POTASSIUM CHLORIDE, CALCIUM CHLORIDE 600; 310; 30; 20 MG/100ML; MG/100ML; MG/100ML; MG/100ML
75 INJECTION, SOLUTION INTRAVENOUS CONTINUOUS
Status: DISCONTINUED | OUTPATIENT
Start: 2024-06-07 | End: 2024-06-08 | Stop reason: HOSPADM

## 2024-06-07 RX ORDER — ALBUMIN, HUMAN INJ 5% 5 %
SOLUTION INTRAVENOUS CONTINUOUS PRN
Status: DISCONTINUED | OUTPATIENT
Start: 2024-06-07 | End: 2024-06-07

## 2024-06-07 RX ORDER — FENTANYL CITRATE 50 UG/ML
INJECTION, SOLUTION INTRAMUSCULAR; INTRAVENOUS AS NEEDED
Status: DISCONTINUED | OUTPATIENT
Start: 2024-06-07 | End: 2024-06-07

## 2024-06-07 RX ORDER — DOCUSATE SODIUM 100 MG/1
100 CAPSULE, LIQUID FILLED ORAL 2 TIMES DAILY
Status: DISCONTINUED | OUTPATIENT
Start: 2024-06-07 | End: 2024-06-08 | Stop reason: HOSPADM

## 2024-06-07 RX ORDER — HYDROMORPHONE HCL/PF 1 MG/ML
0.5 SYRINGE (ML) INJECTION
Status: DISCONTINUED | OUTPATIENT
Start: 2024-06-07 | End: 2024-06-07 | Stop reason: HOSPADM

## 2024-06-07 RX ORDER — CEFAZOLIN SODIUM 2 G/50ML
2000 SOLUTION INTRAVENOUS ONCE
Status: COMPLETED | OUTPATIENT
Start: 2024-06-07 | End: 2024-06-07

## 2024-06-07 RX ORDER — HYDROMORPHONE HCL/PF 1 MG/ML
SYRINGE (ML) INJECTION AS NEEDED
Status: DISCONTINUED | OUTPATIENT
Start: 2024-06-07 | End: 2024-06-07

## 2024-06-07 RX ORDER — ASPIRIN 81 MG/1
81 TABLET, CHEWABLE ORAL DAILY
Status: DISCONTINUED | OUTPATIENT
Start: 2024-06-08 | End: 2024-06-08 | Stop reason: HOSPADM

## 2024-06-07 RX ORDER — PROPOFOL 10 MG/ML
INJECTION, EMULSION INTRAVENOUS AS NEEDED
Status: DISCONTINUED | OUTPATIENT
Start: 2024-06-07 | End: 2024-06-07

## 2024-06-07 RX ORDER — MAGNESIUM HYDROXIDE 1200 MG/15ML
LIQUID ORAL AS NEEDED
Status: DISCONTINUED | OUTPATIENT
Start: 2024-06-07 | End: 2024-06-07 | Stop reason: HOSPADM

## 2024-06-07 RX ORDER — LIDOCAINE HYDROCHLORIDE 10 MG/ML
INJECTION, SOLUTION EPIDURAL; INFILTRATION; INTRACAUDAL; PERINEURAL AS NEEDED
Status: DISCONTINUED | OUTPATIENT
Start: 2024-06-07 | End: 2024-06-07

## 2024-06-07 RX ORDER — SODIUM CHLORIDE, SODIUM LACTATE, POTASSIUM CHLORIDE, CALCIUM CHLORIDE 600; 310; 30; 20 MG/100ML; MG/100ML; MG/100ML; MG/100ML
100 INJECTION, SOLUTION INTRAVENOUS CONTINUOUS
Status: DISCONTINUED | OUTPATIENT
Start: 2024-06-07 | End: 2024-06-08

## 2024-06-07 RX ORDER — ACETAMINOPHEN 325 MG/1
975 TABLET ORAL ONCE
Status: COMPLETED | OUTPATIENT
Start: 2024-06-07 | End: 2024-06-07

## 2024-06-07 RX ORDER — ONDANSETRON 2 MG/ML
INJECTION INTRAMUSCULAR; INTRAVENOUS AS NEEDED
Status: DISCONTINUED | OUTPATIENT
Start: 2024-06-07 | End: 2024-06-07

## 2024-06-07 RX ORDER — ONDANSETRON 2 MG/ML
4 INJECTION INTRAMUSCULAR; INTRAVENOUS EVERY 6 HOURS PRN
Status: DISCONTINUED | OUTPATIENT
Start: 2024-06-07 | End: 2024-06-08 | Stop reason: HOSPADM

## 2024-06-07 RX ORDER — ACYCLOVIR 800 MG/1
400 TABLET ORAL 2 TIMES DAILY
Status: DISCONTINUED | OUTPATIENT
Start: 2024-06-07 | End: 2024-06-08

## 2024-06-07 RX ORDER — ALBUTEROL SULFATE 2.5 MG/3ML
2.5 SOLUTION RESPIRATORY (INHALATION) EVERY 6 HOURS PRN
Status: DISCONTINUED | OUTPATIENT
Start: 2024-06-07 | End: 2024-06-07

## 2024-06-07 RX ADMIN — FENTANYL CITRATE 100 MCG: 50 INJECTION, SOLUTION INTRAMUSCULAR; INTRAVENOUS at 15:20

## 2024-06-07 RX ADMIN — ALBUMIN, HUMAN INJ 5%: 5 SOLUTION at 16:12

## 2024-06-07 RX ADMIN — SODIUM CHLORIDE, SODIUM LACTATE, POTASSIUM CHLORIDE, CALCIUM CHLORIDE: 600; 310; 30; 20 INJECTION, SOLUTION INTRAVENOUS at 15:16

## 2024-06-07 RX ADMIN — Medication 0.5 MG: at 18:38

## 2024-06-07 RX ADMIN — ACETAMINOPHEN 975 MG: 325 TABLET, FILM COATED ORAL at 20:25

## 2024-06-07 RX ADMIN — ONDANSETRON 4 MG: 2 INJECTION INTRAMUSCULAR; INTRAVENOUS at 18:13

## 2024-06-07 RX ADMIN — PROPOFOL 50 MG: 10 INJECTION, EMULSION INTRAVENOUS at 15:21

## 2024-06-07 RX ADMIN — ROCURONIUM BROMIDE 30 MG: 10 INJECTION, SOLUTION INTRAVENOUS at 15:50

## 2024-06-07 RX ADMIN — CEFAZOLIN SODIUM 2000 MG: 2 SOLUTION INTRAVENOUS at 15:29

## 2024-06-07 RX ADMIN — Medication 0.5 MG: at 15:52

## 2024-06-07 RX ADMIN — LABETALOL HYDROCHLORIDE 5 MG: 5 INJECTION, SOLUTION INTRAVENOUS at 16:00

## 2024-06-07 RX ADMIN — ROCURONIUM BROMIDE 50 MG: 10 INJECTION, SOLUTION INTRAVENOUS at 15:21

## 2024-06-07 RX ADMIN — Medication 0.5 MG: at 15:57

## 2024-06-07 RX ADMIN — LABETALOL HYDROCHLORIDE 5 MG: 5 INJECTION, SOLUTION INTRAVENOUS at 15:59

## 2024-06-07 RX ADMIN — LIDOCAINE HYDROCHLORIDE 50 MG: 10 INJECTION, SOLUTION EPIDURAL; INFILTRATION; INTRACAUDAL; PERINEURAL at 15:20

## 2024-06-07 RX ADMIN — HYDROMORPHONE HYDROCHLORIDE 0.5 MG: 1 INJECTION, SOLUTION INTRAMUSCULAR; INTRAVENOUS; SUBCUTANEOUS at 22:21

## 2024-06-07 RX ADMIN — Medication 0.5 MG: at 18:43

## 2024-06-07 RX ADMIN — ACETAMINOPHEN 975 MG: 325 TABLET, FILM COATED ORAL at 14:10

## 2024-06-07 RX ADMIN — PROPOFOL 150 MG: 10 INJECTION, EMULSION INTRAVENOUS at 15:20

## 2024-06-07 RX ADMIN — FENTANYL CITRATE 50 MCG: 50 INJECTION INTRAMUSCULAR; INTRAVENOUS at 19:20

## 2024-06-07 RX ADMIN — ROCURONIUM BROMIDE 20 MG: 10 INJECTION, SOLUTION INTRAVENOUS at 17:07

## 2024-06-07 RX ADMIN — SODIUM CHLORIDE, SODIUM LACTATE, POTASSIUM CHLORIDE, AND CALCIUM CHLORIDE 75 ML/HR: .6; .31; .03; .02 INJECTION, SOLUTION INTRAVENOUS at 20:24

## 2024-06-07 RX ADMIN — OXYCODONE HYDROCHLORIDE 10 MG: 10 TABLET ORAL at 21:30

## 2024-06-07 RX ADMIN — SODIUM CHLORIDE, SODIUM LACTATE, POTASSIUM CHLORIDE, CALCIUM CHLORIDE: 600; 310; 30; 20 INJECTION, SOLUTION INTRAVENOUS at 16:08

## 2024-06-07 RX ADMIN — FENTANYL CITRATE 50 MCG: 50 INJECTION INTRAMUSCULAR; INTRAVENOUS at 19:10

## 2024-06-07 RX ADMIN — ROCURONIUM BROMIDE 10 MG: 10 INJECTION, SOLUTION INTRAVENOUS at 18:02

## 2024-06-07 RX ADMIN — MIDAZOLAM HYDROCHLORIDE 2 MG: 2 INJECTION, SOLUTION INTRAMUSCULAR; INTRAVENOUS at 15:13

## 2024-06-07 RX ADMIN — METRONIDAZOLE: 500 INJECTION, SOLUTION INTRAVENOUS at 15:34

## 2024-06-07 RX ADMIN — ACYCLOVIR 400 MG: 800 TABLET ORAL at 21:30

## 2024-06-07 NOTE — ANESTHESIA POSTPROCEDURE EVALUATION
Post-Op Assessment Note    CV Status:  Stable  Pain Score: 0    Pain management: adequate       Mental Status:  Alert and awake   Hydration Status:  Euvolemic   PONV Controlled:  Controlled   Airway Patency:  Patent  Two or more mitigation strategies used for obstructive sleep apnea   Post Op Vitals Reviewed: Yes    No anethesia notable event occurred.    Staff: LISA               /66 (06/07/24 1847)    Temp (!) 97.4 °F (36.3 °C) (06/07/24 1841)    Pulse 66 (06/07/24 1847)   Resp 14 (06/07/24 1847)    SpO2 91 % (06/07/24 1847)

## 2024-06-07 NOTE — OP NOTE
OPERATIVE REPORT  PATIENT NAME: Cayetano Lucero    :  1955  MRN: [id number]  Pt Location: [facility] OR ROOM 14    SURGERY DATE: 2024    Surgeons and Role:     * Immanuel Gonsales MD - Primary     * Fabian Edmonds PA-C - Assisting     * Lorrie Rick PA-C - Assisting     * Alfredo Donnelly - Assisting    Preop Diagnosis:  Paraesophageal hernia with gastroesophageal reflux [K44.9, K21.9]    Post-Op Diagnosis Codes:     * Paraesophageal hernia with gastroesophageal reflux [K44.9, K21.9]    Procedure(s):  Robotic type III paraesophageal hernia repair with absorbable mesh and partial toupee fundoplication  EGD    Specimen(s):  ID Type Source Tests Collected by Time Destination   1 : posterior paraesophageal fat Tissue Other TISSUE EXAM Immanuel Gonsales MD 2024 1649    2 : Anterior Gastric Fat Pad Tissue Other TISSUE EXAM Immanuel Gonsales MD 2024 1706        Estimated Blood Loss:   50 mL    Drains:  * No LDAs found *    Anesthesia Type:   General    Operative Indications:  Paraesophageal hernia with gastroesophageal reflux [K44.9, K21.9]  68-year-old male with a type III paraesophageal hernia and severe gastric reflux with esophagitis    Operative Findings:  Moderate esophagitis.  GE junction initially at 37 cm from the incisors.  At the conclusion 44 cm from the incisors.      Complications:   None    Procedure and Technique:  After obtaining informed consent from the patient, they were transferred to the operating room and placed supine on the operating table. General anesthesia was then induced and a single-lumen endotracheal tube was placed without incident. A footboard was placed at the base of the bed and the patients feet, legs and thighs were secured to the bed. All bony prominences were padded and checked.  The operating room table was placed in full reverse Trendelenburg to check for any patient movement and adjustments were made as needed.    Next a formal time-out was called verifying  patient , date of birth, procedure, consent, antibiotics, beta-blocker as indicated, anticipated specimens with handling, SCD use and other special considerations.     Next and EGD was performed.  The adult endoscope was inserted into the patient's oropharynx and directed down into their esophagus. Here we encountered a moderate hernia.  The gastroesophageal junction was located at 37cm from the incisors.    The abdomen was then prepped with ChloraPrep and draped in standard surgical fashion. An 8 mm incision was made at Kunz's point, 2 finger breaths below the left costal margin in the midclavicular line. The Veress needle was inserted through the abdominal wall and into the abdominal cavity using the water drop technique. Next the abdomen was then insufflated without issue.  A supraumbilical 8 mm incision was made and a robotic trocar was placed. The 30 degree robotic camera was inserted and the abdomen was thoroughly inspected. There was no sign Veress needle or trocar injury. All other ports were placed under direct visualization using the robotic camera.  3mL of 0.25% Marcaine was used to anesthetize the peritoneum for all additional port placements. Next a left upper quadrant 8mm lateral robotic port was placed. An robotic 8mm port was placed through the Veress incision.  An 8mm RUQ robotic port was placed just lateral to the falciform, at the same level as the LUQ port. A RLQ 12mm assistant port was placed between the camera and the RUQ robotic port.  Finally a 5 mm incision was made just to the left of the xiphoid process.  A Ruddy liver retractor was placed through here and secured to the bedside with a sterile mcrae. The patient was then placed in full reverse trendeleburg.     The [employer] Xi robot was docked at this time. A tip-up fenestrated grasper, caudier grasper and robotic vessel sealer were inserted and tested. I un-scrubbed at this time and went to the robotic console.     We started our  dissection along the lesser curvature, entering the gastro-hepatic ligament. The robotic vessel sealer was used to continue this dissection towards the right crura. Once this was identified we continued this dissection along the crura, being careful to protect the peritoneal lining of the muscle. We slowly began to reduce the hernia sac, keeping this with our stomach. We continued our dissection into the mediastinum,  all mediastinal attachments and taking care to avoid the pleura. We specifically looked for and identified the bilateral vagus nerves in the chest, taking care to stay well away from these with our dissection. The mediastinal dissection was continued around to the left lateral aspect of the esophagus and down onto the left crura.     We then transitioned to the greater curvature of the stomach on the fundus.  A few short gastric vessels were taken with the vessel sealer moving cephalad around the fundus towards the GE junction. We continued this dissection up towards the left corner with lobe rolling the stomach medially.  This eventually allowed us to meet up with our previous mediastinal dissection.    At this point, we went back to the lesser curvature and dissected the inferior aspect of the gastro-esophageal junction off of the crura. A window was made in this retroesophageal space and a penrose drain was passed through this space.  This Penrose drain was then passed through a slit in itself and tightened to help with downward retraction on the gastroesophageal junction. The bedside assistant helped to grasp this penrose, providing counter traction. With the help with this retraction we went back up into the mediastinum and fully mobilized the hernia sac.  The mediastinal attachments to the esophagus were freed.  Again care was taken in this area to identify the bilateral vagus nerves and preserved these.  The dissection this area with continued up as high as we could go, proximally to  level of the inferior pulmonary veins.      We then dissected off the hernia sac a portion of the anterior gastric fat pad.  This allowed us to better visualize the GE junction.  This was set aside and later removed and sent for permanent pathology.  There was also some retroesophageal fat that was removed and sent for permanent pathology.  We examined the GE junction at this point it appeared to be 3cm below the diaphragm.  Satisfied with our dissection we performed a repeat EGD.       Repeat EGD again showed a fully reduced hernia. The gastroesophageal junction was located at 44cm from the incisors.  There was no evidence of gastric masses, polyps or ulcers.  The duodenum was entered and there was no evidence ulcers or masses. Retroflexion view of the GE junction did not show any abnormalities and showed a fully reduced hiatal hernia. Next a 56 Fr bougie was placed without issue.      Next we performed our crural repair. Simple, interrupted 0 Ethibond sutures were used to reapproximate the crura posteriorly.  A total of 3 sutures were placed posteriorly.  When finished the esophagus without any bougie or EGD had a few mm of room circumferentially around the crura.  Next a Bio A Reeves semi-biologic mesh was placed at the hiatus. This was positioned in place behind the liver and underneath the gastroesophageal junction.    We then performed our partial toupee fundoplication.  We mobilized a few more short gastrics to make sure this was tension-free.  We grasped the greater curvature of the stomach from behind the esophagus and translated this posteriorly.  A shoeshine maneuver was performed to ensure this was tension-free and not twisted.  We then sutured our toupee fundoplication using interrupted 2-0 Ethibond sutures.  3 sutures were used on either side.  We made certain to wrap the distal esophagus and GE junction.  Her toupee fundoplication was 3 cm in length.  On the last superiormost patient left side stitch we  also sutured this to the crura and mesh.     The abdomen was then inspected and thoroughly aspirated dry.  There was no active bleeding identified. The robot was undocked at this time and all robotic instruments were removed.  The fascia for the 12 mm assistant port site was then closed using a percutaneous closure device and an 0 vicryl suture. The skin and soft tissue was closed with 4 Monocryl.  Surgical glue was applied to all incisions.     Sponge, needle and instrument counts were correct at the conclusion of the procedure. The patient was extubated without incident in the operating room and was transported to the PACU in stable condition.         I was present for the entire procedure.    Patient Disposition:  PACU         SIGNATURE: Immanuel Gonsales MD  DATE: June 7, 2024  TIME: 6:39 PM

## 2024-06-07 NOTE — ANESTHESIA PREPROCEDURE EVALUATION
Procedure:  REPAIR HERNIA PARAESOPHAGEAL LAPAROSCOPIC W ROBOTICS (Abdomen)  ESOPHAGOGASTRODUODENOSCOPY (EGD) (Esophagus)    Relevant Problems   CARDIO   (+) CAD (coronary artery disease)   (+) Hyperlipidemia   (+) Hypertension      ENDO   (+) Type 2 diabetes mellitus with diabetic polyneuropathy, with long-term current use of insulin (HCC)   (+) Type 2 diabetes mellitus with hyperglycemia, with long-term current use of insulin (HCC)      GI/HEPATIC   (+) Dysphagia   (+) Esophageal dysphagia   (+) Gastroesophageal reflux disease without esophagitis   (+) Ulcer of esophagus without bleeding      /RENAL   (+) Stage 3 chronic kidney disease, unspecified whether stage 3a or 3b CKD (HCC)      HEMATOLOGY   (+) Anemia, chronic disease   (+) Autoimmune hemolytic anemia (HCC)   (+) 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)   (+) Hemolytic anemia (HCC)   (+) Pancytopenia (HCC)   (+) Thrombocytopenia (HCC)      NEURO/PSYCH   (+) Chronic right shoulder pain   (+) Depression, recurrent (HCC)   (+) Headache around the eyes      PULMONARY   (+) Chronic obstructive pulmonary disease (HCC)      Other   (+) CLL (chronic lymphocytic leukemia) (HCC)   (+) Chronic lymphocytic leukemia (HCC)      TTE 4/27/18  Nml BiV fxn, no significant valvular disease    Lab Results   Component Value Date    WBC 4.33 05/14/2024    HGB 14.3 05/14/2024    HCT 44.0 05/14/2024    MCV 93 05/14/2024     05/14/2024     Lab Results   Component Value Date     12/29/2015    K 3.8 05/14/2024    CO2 28 05/14/2024     05/14/2024    BUN 16 05/14/2024    CREATININE 1.04 05/14/2024     Lab Results   Component Value Date    INR 1.14 04/12/2022    INR 1.11 10/28/2017    INR 1.19 (H) 10/07/2017    PROTIME 14.3 04/12/2022    PROTIME 14.3 10/28/2017    PROTIME 15.2 (H) 10/07/2017     Lab Results   Component Value Date    PTT 29 04/12/2022       Lab Results   Component Value Date    GLUCOSE 109 12/29/2015    GLUCOSE 119 12/01/2015    GLUCOSE 139  11/03/2015     POC  @ 1328    Lab Results   Component Value Date    HGBA1C 6.8 (H) 04/16/2024       Type and Screen:  O    Physical Exam    Airway    Mallampati score: II  TM Distance: >3 FB  Neck ROM: full     Dental    upper dentures and lower dentures    Cardiovascular      Pulmonary      Other Findings        Anesthesia Plan  ASA Score- 3     Anesthesia Type- general with ASA Monitors.         Additional Monitors:     Airway Plan: ETT.           Plan Factors-Exercise tolerance (METS): >4 METS.    Chart reviewed.   Existing labs reviewed. Patient summary reviewed.                  Induction- intravenous.    Postoperative Plan- Plan for postoperative opioid use. Planned trial extubation        Informed Consent- Anesthetic plan and risks discussed with patient.  I personally reviewed this patient with the CRNA. Discussed and agreed on the Anesthesia Plan with the CRNA..

## 2024-06-08 ENCOUNTER — APPOINTMENT (OUTPATIENT)
Dept: RADIOLOGY | Facility: HOSPITAL | Age: 69
End: 2024-06-08
Payer: MEDICARE

## 2024-06-08 VITALS
BODY MASS INDEX: 30.04 KG/M2 | OXYGEN SATURATION: 89 % | TEMPERATURE: 97.5 F | HEART RATE: 85 BPM | WEIGHT: 226.63 LBS | RESPIRATION RATE: 16 BRPM | HEIGHT: 73 IN | SYSTOLIC BLOOD PRESSURE: 170 MMHG | DIASTOLIC BLOOD PRESSURE: 82 MMHG

## 2024-06-08 LAB
ANION GAP SERPL CALCULATED.3IONS-SCNC: 9 MMOL/L (ref 4–13)
ATRIAL RATE: 76 BPM
ATRIAL RATE: 78 BPM
BUN SERPL-MCNC: 17 MG/DL (ref 5–25)
CALCIUM SERPL-MCNC: 9 MG/DL (ref 8.4–10.2)
CHLORIDE SERPL-SCNC: 103 MMOL/L (ref 96–108)
CO2 SERPL-SCNC: 26 MMOL/L (ref 21–32)
CREAT SERPL-MCNC: 1.06 MG/DL (ref 0.6–1.3)
ERYTHROCYTE [DISTWIDTH] IN BLOOD BY AUTOMATED COUNT: 13.8 % (ref 11.6–15.1)
GFR SERPL CREATININE-BSD FRML MDRD: 71 ML/MIN/1.73SQ M
GLUCOSE SERPL-MCNC: 146 MG/DL (ref 65–140)
GLUCOSE SERPL-MCNC: 150 MG/DL (ref 65–140)
GLUCOSE SERPL-MCNC: 164 MG/DL (ref 65–140)
GLUCOSE SERPL-MCNC: 183 MG/DL (ref 65–140)
HCT VFR BLD AUTO: 45.5 % (ref 36.5–49.3)
HGB BLD-MCNC: 14.7 G/DL (ref 12–17)
MAGNESIUM SERPL-MCNC: 1.7 MG/DL (ref 1.9–2.7)
MCH RBC QN AUTO: 30.2 PG (ref 26.8–34.3)
MCHC RBC AUTO-ENTMCNC: 32.3 G/DL (ref 31.4–37.4)
MCV RBC AUTO: 94 FL (ref 82–98)
P AXIS: 18 DEGREES
P AXIS: 29 DEGREES
PLATELET # BLD AUTO: 148 THOUSANDS/UL (ref 149–390)
PMV BLD AUTO: 10.9 FL (ref 8.9–12.7)
POTASSIUM SERPL-SCNC: 4.1 MMOL/L (ref 3.5–5.3)
PR INTERVAL: 164 MS
PR INTERVAL: 168 MS
QRS AXIS: -28 DEGREES
QRS AXIS: -29 DEGREES
QRSD INTERVAL: 88 MS
QRSD INTERVAL: 90 MS
QT INTERVAL: 402 MS
QT INTERVAL: 406 MS
QTC INTERVAL: 452 MS
QTC INTERVAL: 462 MS
RBC # BLD AUTO: 4.86 MILLION/UL (ref 3.88–5.62)
SODIUM SERPL-SCNC: 138 MMOL/L (ref 135–147)
T WAVE AXIS: -1 DEGREES
T WAVE AXIS: -3 DEGREES
VENTRICULAR RATE: 76 BPM
VENTRICULAR RATE: 78 BPM
WBC # BLD AUTO: 6.29 THOUSAND/UL (ref 4.31–10.16)

## 2024-06-08 PROCEDURE — 83735 ASSAY OF MAGNESIUM: CPT

## 2024-06-08 PROCEDURE — 93005 ELECTROCARDIOGRAM TRACING: CPT

## 2024-06-08 PROCEDURE — NC001 PR NO CHARGE: Performed by: THORACIC SURGERY (CARDIOTHORACIC VASCULAR SURGERY)

## 2024-06-08 PROCEDURE — 93010 ELECTROCARDIOGRAM REPORT: CPT | Performed by: INTERNAL MEDICINE

## 2024-06-08 PROCEDURE — 80048 BASIC METABOLIC PNL TOTAL CA: CPT

## 2024-06-08 PROCEDURE — 99024 POSTOP FOLLOW-UP VISIT: CPT | Performed by: THORACIC SURGERY (CARDIOTHORACIC VASCULAR SURGERY)

## 2024-06-08 PROCEDURE — 97166 OT EVAL MOD COMPLEX 45 MIN: CPT

## 2024-06-08 PROCEDURE — 85027 COMPLETE CBC AUTOMATED: CPT

## 2024-06-08 PROCEDURE — 82948 REAGENT STRIP/BLOOD GLUCOSE: CPT

## 2024-06-08 PROCEDURE — 74220 X-RAY XM ESOPHAGUS 1CNTRST: CPT

## 2024-06-08 PROCEDURE — C9113 INJ PANTOPRAZOLE SODIUM, VIA: HCPCS

## 2024-06-08 PROCEDURE — 97162 PT EVAL MOD COMPLEX 30 MIN: CPT

## 2024-06-08 RX ORDER — INSULIN LISPRO 100 [IU]/ML
1-6 INJECTION, SOLUTION INTRAVENOUS; SUBCUTANEOUS
Status: DISCONTINUED | OUTPATIENT
Start: 2024-06-08 | End: 2024-06-08 | Stop reason: HOSPADM

## 2024-06-08 RX ORDER — LABETALOL HYDROCHLORIDE 5 MG/ML
10 INJECTION, SOLUTION INTRAVENOUS ONCE
Status: COMPLETED | OUTPATIENT
Start: 2024-06-08 | End: 2024-06-08

## 2024-06-08 RX ORDER — OXYCODONE HYDROCHLORIDE 5 MG/1
5 TABLET ORAL EVERY 4 HOURS PRN
Qty: 30 TABLET | Refills: 0 | Status: SHIPPED | OUTPATIENT
Start: 2024-06-08 | End: 2024-06-18

## 2024-06-08 RX ORDER — ACYCLOVIR 200 MG/1
400 CAPSULE ORAL EVERY 12 HOURS SCHEDULED
Status: DISCONTINUED | OUTPATIENT
Start: 2024-06-08 | End: 2024-06-08 | Stop reason: HOSPADM

## 2024-06-08 RX ADMIN — SENNOSIDES 8.6 MG: 8.6 TABLET, FILM COATED ORAL at 08:42

## 2024-06-08 RX ADMIN — GABAPENTIN 600 MG: 300 CAPSULE ORAL at 08:43

## 2024-06-08 RX ADMIN — LABETALOL HYDROCHLORIDE 10 MG: 5 INJECTION, SOLUTION INTRAVENOUS at 01:20

## 2024-06-08 RX ADMIN — INSULIN LISPRO 1 UNITS: 100 INJECTION, SOLUTION INTRAVENOUS; SUBCUTANEOUS at 12:53

## 2024-06-08 RX ADMIN — ASPIRIN 81 MG CHEWABLE TABLET 81 MG: 81 TABLET CHEWABLE at 08:42

## 2024-06-08 RX ADMIN — ACETAMINOPHEN 975 MG: 325 TABLET, FILM COATED ORAL at 12:52

## 2024-06-08 RX ADMIN — ONDANSETRON 4 MG: 2 INJECTION INTRAMUSCULAR; INTRAVENOUS at 06:48

## 2024-06-08 RX ADMIN — ACYCLOVIR 400 MG: 200 CAPSULE ORAL at 12:52

## 2024-06-08 RX ADMIN — OXYCODONE HYDROCHLORIDE 10 MG: 10 TABLET ORAL at 06:52

## 2024-06-08 RX ADMIN — OXYCODONE HYDROCHLORIDE 10 MG: 10 TABLET ORAL at 01:40

## 2024-06-08 RX ADMIN — OXYCODONE HYDROCHLORIDE 10 MG: 10 TABLET ORAL at 12:52

## 2024-06-08 RX ADMIN — IOHEXOL 50 ML: 300 INJECTION, SOLUTION INTRAVENOUS at 12:49

## 2024-06-08 RX ADMIN — ACETAMINOPHEN 975 MG: 325 TABLET, FILM COATED ORAL at 01:21

## 2024-06-08 RX ADMIN — PREDNISONE 5 MG: 5 TABLET ORAL at 08:42

## 2024-06-08 RX ADMIN — PANTOPRAZOLE SODIUM 40 MG: 40 INJECTION, POWDER, FOR SOLUTION INTRAVENOUS at 08:41

## 2024-06-08 RX ADMIN — POLYETHYLENE GLYCOL 3350 17 G: 17 POWDER, FOR SOLUTION ORAL at 08:42

## 2024-06-08 RX ADMIN — DOCUSATE SODIUM 100 MG: 100 CAPSULE, LIQUID FILLED ORAL at 08:42

## 2024-06-08 RX ADMIN — ACETAMINOPHEN 975 MG: 325 TABLET, FILM COATED ORAL at 06:24

## 2024-06-08 RX ADMIN — HYDROMORPHONE HYDROCHLORIDE 0.5 MG: 1 INJECTION, SOLUTION INTRAMUSCULAR; INTRAVENOUS; SUBCUTANEOUS at 08:47

## 2024-06-08 NOTE — PHYSICAL THERAPY NOTE
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)    Anemia, chronic disease    Chronic lymphocytic leukemia (HCC)    Leukopenia due to antineoplastic chemotherapy (HCC)    Hyperglycemia    Cellulitis of head or scalp    Pancytopenia (HCC)    Headache around the eyes    Gastroesophageal reflux disease without esophagitis    Colitis, acute    Listeria bacteremia    Thrombocytopenia (HCC)    Type 2 diabetes mellitus with hyperglycemia, with long-term current use of insulin (HCC)    Chronic right shoulder pain    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HCC)    Ulcer of esophagus without bleeding    Esophageal stricture    Dysphagia    Esophageal dysphagia    Acute bursitis of right shoulder    Hypogammaglobulinemia, acquired (HCC)    Autoimmune hemolytic anemia (HCC)    Right upper quadrant abdominal pain    Chronic obstructive pulmonary disease (HCC)    Depression, recurrent (HCC)    Long term (current) use of systemic steroids    Cancer related pain    Preop cardiovascular exam    Type 2 diabetes mellitus with diabetic polyneuropathy, with long-term current use of insulin (HCC)    Port-A-Cath in place    Low serum IgG for age    Drug-induced osteoporosis    Paraesophageal hernia with gastroesophageal reflux       Past Medical History  Past Medical History:   Diagnosis Date    Allergic     Anemia     Arthritis     CAD (coronary artery disease) 2004    Cancer (HCC)     Leukemia    Cellulitis of  scalp     CKD (chronic kidney disease) stage 3, GFR 30-59 ml/min (HCC)     CLL (chronic lymphocytic leukemia) (HCC)     COPD (chronic obstructive pulmonary disease) (HCC)     Diabetes mellitus (HCC)     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    Dyslipidemia     Edema extremities     Esophageal ulcer     H/O blood clots     Hemolytic anemia (HCC)     Hiatal hernia     History of TIA (transient ischemic attack)     Hyperlipidemia     Hypertension     Listeria infection 2018    Multiple gastric ulcers     Myocardial infarction (HCC) 2004    acute    Neutropenia (HCC)     Obese     Recurrent sinusitis     Thrombocytopenia (HCC)     Vertigo        Past Surgical History  Past Surgical History:   Procedure Laterality Date    ARTHROSCOPIC REPAIR ACL      lt knee    CARDIAC SURGERY      cardiac cath with stent    FOOT SURGERY      IR PORT CHECK  5/17/2019    KNEE ARTHROSCOPY      OTHER SURGICAL HISTORY      cardiac cath lesion 1, 1st adjunct treat device: stent    PORTACATH PLACEMENT      OR ESOPHAGOGASTRODUODENOSCOPY TRANSORAL DIAGNOSTIC N/A 10/5/2017    Procedure: ESOPHAGOGASTRODUODENOSCOPY (EGD) with bx;  Surgeon: Winifred Hansen DO;  Location: [facility];  Service: Gastroenterology    OR ESOPHAGOGASTRODUODENOSCOPY TRANSORAL DIAGNOSTIC N/A 3/8/2018    Procedure: ESOPHAGOGASTRODUODENOSCOPY (EGD) with biopsy;  Surgeon: Winifred Hansen DO;  Location: [facility];  Service: Gastroenterology    OR ESOPHAGOGASTRODUODENOSCOPY TRANSORAL DIAGNOSTIC N/A 6/20/2018    Procedure: ESOPHAGOGASTRODUODENOSCOPY (EGD) with Dilation;  Surgeon: Winifred Hansen DO;  Location: [facility];  Service: Gastroenterology    OR ESOPHAGOGASTRODUODENOSCOPY TRANSORAL DIAGNOSTIC N/A 10/18/2018    Procedure: ESOPHAGOGASTRODUODENOSCOPY (EGD) with dilation;  Surgeon: Edu Mejía MD;  Location: [facility];  Service: Gastroenterology    OR ESOPHAGOSCOPY FLEXIBLE TRANSORAL WITH BIOPSY N/A 9/2/2016    Procedure: ESOPHAGOGASTRODUODENOSCOPY (EGD); ESOPHAGEAL DILATION; ESOPHAGEAL  BIOPSY;  Surgeon: Abdi Holcomb MD;  Location: [facility];  Service: Thoracic    TONSILLECTOMY      UPPER GASTROINTESTINAL ENDOSCOPY      x 6          06/08/24 1019   PT Last Visit   PT Visit Date 06/08/24   Note Type   Note type Evaluation   Pain Assessment   Pain Assessment Tool 0-10   Pain Score 7   Pain Location/Orientation Location: Abdomen   Pain Onset/Description Onset: Ongoing;Frequency: Constant/Continuous;Descriptor: Sore;Descriptor: Discomfort   Effect of Pain on Daily Activities limits comfort and mobility   Patient's Stated Pain Goal No pain   Hospital Pain Intervention(s) Repositioned;Ambulation/increased activity;Emotional support   Restrictions/Precautions   Weight Bearing Precautions Per Order No   Other Precautions Pain   Home Living   Type of Home House   Home Layout Multi-level;Access;Stairs to enter with rails;Bed/bath upstairs  (2 GREGORY - FFOS to each floor)   Bathroom Shower/Tub Walk-in shower   Bathroom Toilet Standard   Bathroom Accessibility Accessible   Home Equipment Cane;Walker  (owns but not used PTA)   Prior Function   Level of [place] Independent with functional mobility;Independent with IADLS;Independent with ADLs   Lives With Spouse   Receives Help From Family   IADLs Independent with driving;Independent with medication management;Independent with meal prep   Falls in the last 6 months 0   Vocational Retired   General   Family/Caregiver Present No   Cognition   Overall Cognitive Status WFL   Arousal/Participation Alert   Orientation Level Oriented X4   Memory Within functional limits   Following Commands Follows all commands and directions without difficulty   Comments Patient pleasant and cooperative   Subjective   Subjective Patient agreeable to PT eval   RUE Assessment   RUE Assessment WFL   LUE Assessment   LUE Assessment WFL   RLE Assessment   RLE Assessment WFL   LLE Assessment   LLE Assessment WFL   Bed Mobility   Supine to Sit 6  Modified independent   Transfers   Sit  to Stand 6  Modified independent   Stand to Sit 6  Modified independent   Ambulation/Elevation   Gait Assistance 5  Supervision   Assistive Device None   Distance 200'   Balance   Static Sitting Normal   Dynamic Sitting Good   Static Standing Fair +   Dynamic Standing Fair   Ambulatory Fair -   Endurance Deficit   Endurance Deficit No   Activity Tolerance   Activity Tolerance Patient tolerated treatment well;Patient limited by pain   Medical Staff Made Aware OT   Nurse Made Aware RN cleared   Assessment   Prognosis Good   Problem List Pain   Assessment Pt is a 68 y.o. male seen for a moderate complexity PT evaluation due to Ongoing medical management for primary dx, Decreased activity tolerance compared to baseline, s/p surgical intervention. Patient is s/p [facility] for Paraesophageal hernia with gastroesophageal reflux (K44.9, K21.9). Patient  has a past medical history of Allergic, Anemia, Arthritis, CAD (coronary artery disease), Cancer (HCC), Cellulitis of scalp, CKD (chronic kidney disease) stage 3, GFR 30-59 ml/min (HCC), CLL (chronic lymphocytic leukemia) (HCC), COPD (chronic obstructive pulmonary disease) (HCC), Diabetes mellitus (HCC), Dyslipidemia, Edema extremities, Esophageal ulcer, H/O blood clots, Hemolytic anemia (HCC), Hiatal hernia, History of TIA (transient ischemic attack), Hyperlipidemia, Hypertension, Listeria infection, Multiple gastric ulcers, Myocardial infarction (HCC), Neutropenia (HCC), Obese, Recurrent sinusitis, Thrombocytopenia (HCC), and Vertigo..     PT now consulted to assess functional mobility and needs for safe d/c planning. pt independent with functional mobility, independent ADLs, and independent IADLs. Personal factors affecting status include steps to enter home, steps to negotiate within home, and medical status.     Currently pt requires modified independent for bed mobility, modified independent for functional transfers with no AD ;  supervision for ambulation with no AD. Pt presents functioning at or near baseline level of function. Patient states no concerns with functional mobility at present. Patient is encouraged to continue ambulating with staff assist as able in order to maintain current LOF.     The patient's AM-PAC Basic Mobility Inpatient Short Form Raw Score Is 22. PT is currently recommending no post acute rehab needs on d/c from hospital. Due to patient current status, plan to sign off formal inpatient PT services at this time. Please re-consult should current status change.   Barriers to Discharge None   Goals   Patient Goals to go home   PT Treatment Day 0   Plan   Treatment/Interventions   (d/c PT)   PT Frequency   (d/c PT)   Discharge Recommendation   Rehab Resource Intensity Level, PT No post-acute rehabilitation needs   AM-PAC Basic Mobility Inpatient   Turning in Flat Bed Without Bedrails 4   Lying on Back to Sitting on Edge of Flat Bed Without Bedrails 4   Moving Bed to Chair 4   Standing Up From Chair Using Arms 4   Walk in Room 3   Climb 3-5 Stairs With Railing 3   Basic Mobility Inpatient Raw Score 22   Basic Mobility Standardized Score 47.4   [facility] Highest Level Of Mobility   -HLM Goal 7: Walk 25 feet or more   -HLM Achieved 7: Walk 25 feet or more   Modified Tyler Scale   Modified Tyler Scale 2   End of Consult   Patient Position at End of Consult All needs within reach;Bedside chair         Kanchan Ordonez, PT, DPT

## 2024-06-08 NOTE — OCCUPATIONAL THERAPY NOTE
Occupational Therapy Evaluation      Cayetano [facility]mann[facility]    6/8/2024    Principal Problem:    Paraesophageal hernia with gastroesophageal reflux      Past Medical History:   Diagnosis Date    Allergic     Anemia     Arthritis     CAD (coronary artery disease) 2004    Cancer (HCC)     Leukemia    Cellulitis of scalp     CKD (chronic kidney disease) stage 3, GFR 30-59 ml/min (HCC)     CLL (chronic lymphocytic leukemia) (HCC)     COPD (chronic obstructive pulmonary disease) (HCC)     Diabetes mellitus (HCC)     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    Dyslipidemia     Edema extremities     Esophageal ulcer     H/O blood clots     Hemolytic anemia (HCC)     Hiatal hernia     History of TIA (transient ischemic attack)     Hyperlipidemia     Hypertension     Listeria infection 2018    Multiple gastric ulcers     Myocardial infarction (HCC) 2004    acute    Neutropenia (HCC)     Obese     Recurrent sinusitis     Thrombocytopenia (HCC)     Vertigo        Past Surgical History:   Procedure Laterality Date    ARTHROSCOPIC REPAIR ACL      lt knee    CARDIAC SURGERY      cardiac cath with stent    FOOT SURGERY      IR PORT CHECK  5/17/2019    KNEE ARTHROSCOPY      OTHER SURGICAL HISTORY      cardiac cath lesion 1, 1st adjunct treat device: stent    PORTACATH PLACEMENT      KS ESOPHAGOGASTRODUODENOSCOPY TRANSORAL DIAGNOSTIC N/A 10/5/2017    Procedure: ESOPHAGOGASTRODUODENOSCOPY (EGD) with bx;  Surgeon: Winifred Hansen DO;  Location: [facility];  Service: Gastroenterology    KS ESOPHAGOGASTRODUODENOSCOPY TRANSORAL DIAGNOSTIC N/A 3/8/2018    Procedure: ESOPHAGOGASTRODUODENOSCOPY (EGD) with biopsy;  Surgeon: Winifred Hansen DO;  Location: [facility];  Service: Gastroenterology    KS ESOPHAGOGASTRODUODENOSCOPY TRANSORAL DIAGNOSTIC N/A 6/20/2018    Procedure: ESOPHAGOGASTRODUODENOSCOPY (EGD) with Dilation;  Surgeon: Winifred Hansen DO;  Location: [facility];  Service: Gastroenterology    KS ESOPHAGOGASTRODUODENOSCOPY TRANSORAL DIAGNOSTIC N/A 10/18/2018     Procedure: ESOPHAGOGASTRODUODENOSCOPY (EGD) with dilation;  Surgeon: Edu Mejía MD;  Location: [facility];  Service: Gastroenterology    VT ESOPHAGOSCOPY FLEXIBLE TRANSORAL WITH BIOPSY N/A 9/2/2016    Procedure: ESOPHAGOGASTRODUODENOSCOPY (EGD); ESOPHAGEAL DILATION; ESOPHAGEAL BIOPSY;  Surgeon: Abdi Holcomb MD;  Location: [facility];  Service: Thoracic    TONSILLECTOMY      UPPER GASTROINTESTINAL ENDOSCOPY      x 6        06/08/24 1018   OT Last Visit   OT Visit Date 06/08/24   Note Type   Note type Evaluation   Pain Assessment   Pain Assessment Tool 0-10   Pain Score 7   Pain Location/Orientation Location: Abdomen   Pain Onset/Description Onset: Ongoing   Patient's Stated Pain Goal No pain   Hospital Pain Intervention(s) Repositioned;Ambulation/increased activity;Emotional support;Relaxation technique   Restrictions/Precautions   Weight Bearing Precautions Per Order No   Other Precautions Pain   Home Living   Type of Home House   Home Layout Multi-level;Stairs to enter with rails  (3SH; 2STE)   Bathroom Shower/Tub Walk-in shower   Bathroom Toilet Standard   Bathroom Equipment   (denies)   Bathroom Accessibility Accessible   Home Equipment Walker;Cane  (denies use of DME PTA)   Prior Function   Level of [place] Independent with ADLs;Independent with functional mobility;Independent with IADLS   Lives With Spouse   Receives Help From Family   IADLs Independent with driving;Independent with meal prep;Independent with medication management   Falls in the last 6 months 0   Vocational Retired   Lifestyle   Autonomy Pt reports being IND w/ all ADLS and IADLS; (+) drives; ambulates w/o AD PTA   Reciprocal Relationships Pt lives w/ supportive spouse that can provide A as needed upon D/C   Service to Others Pt is retired   Intrinsic Gratification Pt reports enjoying staying active in his custodial   ADL   Where Assessed Edge of bed   Eating Assistance 7  Independent   Grooming Assistance 7  Independent   UB  Bathing Assistance 7  Independent   LB Bathing Assistance 7  Independent   UB Dressing Assistance 7  Independent   LB Dressing Assistance 7  Independent   Toileting Assistance  7  Independent   Bed Mobility   Supine to Sit 7  Independent   Sit to Supine Unable to assess   Additional Comments Pt laying supinein bed upon OT arrival. Pt seated OOB in chair w/ all needs in reach s/p OT Session   Transfers   Sit to Stand 7  Independent   Stand to Sit 7  Independent   Additional Comments Transfers w/o AD   Functional Mobility   Functional Mobility 7  Independent   Additional Comments Pt demonstrated long distance household mobility in hallway w/o AD at IND level   Balance   Static Sitting Normal   Dynamic Sitting Normal   Static Standing Normal   Dynamic Standing Normal   Ambulatory Normal   Activity Tolerance   Activity Tolerance Patient tolerated treatment well;Patient limited by pain   Medical Staff Made Aware PT Kanchan - Pt seen as a co-session due to the patient's co-morbidities, clinically unstable presentation, and present impairments which are a regression from the patient's baseline.     Nurse Made Aware RN cleared Pt for OT session   RUE Assessment   RUE Assessment WFL   LUE Assessment   LUE Assessment WFL   Hand Function   Gross Motor Coordination Functional   Cognition   Overall Cognitive Status WFL   Arousal/Participation Alert;Cooperative   Attention Within functional limits   Orientation Level Oriented X4   Memory Within functional limits   Following Commands Follows all commands and directions without difficulty   Comments Pleasant and cooperative   Assessment   Limitation Decreased endurance   Prognosis Good   Assessment Pt is a 67 yo male seen for OT eval s/p adm to [facility] on 6/7/24 dx'd w/ paraesophageal hernia now s/p robotic paraesophageal hernia repair with mesh and partial toupee fundoplication. Pt  has a past medical history of Allergic, Anemia, Arthritis, CAD (coronary artery disease) (2004), Cancer  (HCC), Cellulitis of scalp, CKD (chronic kidney disease) stage 3, GFR 30-59 ml/min (HCC), CLL (chronic lymphocytic leukemia) (HCC), COPD (chronic obstructive pulmonary disease) (HCC), Diabetes mellitus (HCC), Dyslipidemia, Edema extremities, Esophageal ulcer, H/O blood clots, Hemolytic anemia (HCC), Hiatal hernia, History of TIA (transient ischemic attack), Hyperlipidemia, Hypertension, Listeria infection (2018), Multiple gastric ulcers, Myocardial infarction (HCC) (2004), Neutropenia (HCC), Obese, Recurrent sinusitis, Thrombocytopenia (HCC), and Vertigo. Pt with active OT orders and activity as tolerated orders. Pt is retired and resides w/ spouse in 2SH w/ few GREGORY. Pt reports spouse can A PRN.  Pt was I w/  ADLS and IADLS, drove, & required no use of DME PTA. Pt is currently demonstrating the following occupational deficits: IND all transfers, mobility, and ADLS w/o AD. These deficits that are impacting pt's baseline areas of occupation are a result of the following impairments: pain and endurance. Based on the aforementioned OT evaluation, functional performance deficits, and assessments, pt has been identified as a moderate complexity evaluation. Recommend  no rehab needs  upon D/C. The patient's raw score on the AM-PAC Daily Activity Inpatient Short Form is 24. A raw score of greater than or equal to 19 suggests the patient may benefit from discharge to home. Please refer to the recommendation of the Occupational Therapist for safe discharge planning.  Pt appears to be functioning at/close to baseline levels. No further acute care OT needs at this time. Will D/C OT. Please re-consult if appropriate. Recommend continued engagement in all meaningful daily activities w/ nursing & restorative staff during hospital stay.   Goals   Patient Goals To return home   Discharge Recommendation   Rehab Resource Intensity Level, OT No post-acute rehabilitation needs   AM-PAC Daily Activity Inpatient   Lower Body Dressing 4    Bathing 4   Toileting 4   Upper Body Dressing 4   Grooming 4   Eating 4   Daily Activity Raw Score 24   Daily Activity Standardized Score (Calc for Raw Score >=11) 57.54   AM-PAC Applied Cognition Inpatient   Following a Speech/Presentation 4   Understanding Ordinary Conversation 4   Taking Medications 4   Remembering Where Things Are Placed or Put Away 4   Remembering List of 4-5 Errands 4   Taking Care of Complicated Tasks 4   Applied Cognition Raw Score 24   Applied Cognition Standardized Score 62.21         Lorrie Rahman MS, OTR/L

## 2024-06-08 NOTE — DISCHARGE SUMMARY
"  Discharge Summary - Cayetano Lucero 68 y.o. male MRN: [id number]    Unit/Bed#: [facility] 427-01 Encounter: [id number]    Admission Date:     Admitting Diagnosis: Paraesophageal hernia with gastroesophageal reflux [K44.9, K21.9]    HPI: Per Dr. Gonsales: \"Cayetano is a 68-year-old male with history of CLL, coronary artery disease, hypertension, COPD, type 2 diabetes mellitus on insulin, stage III CKD, history of HIT, and history of TIAs who we are seeing in consultation for a hiatal hernia and symptomatic reflux.  He has had longstanding reflux for over 20 years.  This is also accompanied by dysphagia.  He has had serial endoscopies with dilation.  The last one was a few years ago.  His biggest complaint at this time is dysphagia with trouble getting food through.  Whenever he gets stuck to 90% of the time he can eventually get this to pass with time and liquids but 10% of the time he has to vomit it up.  He does have daily heartburn.  He is dissatisfied with his current condition.  GERD HRQL score was significant 44.\"    Procedures Performed: No orders of the defined types were placed in this encounter.      Summary of Hospital Course:   - 6/7 presented for elective robotic PEH repair   - 6/8 UGI with passage of contrast through GE junction into stomach, tolerating full liquid diet, discharged home    Significant Findings, Care, Treatment and Services Provided: None    Complications: none    Discharge Diagnosis: paraesophageal hernia    Medical Problems       Resolved Problems  Date Reviewed: 4/15/2024   None         Condition at Discharge: good         Discharge instructions/Information to patient and family:   See after visit summary for information provided to patient and family.      Provisions for Follow-Up Care:  See after visit summary for information related to follow-up care and any pertinent home health orders.      PCP: Meseret Elliott DO    Disposition: Home    Planned Readmission: No      Discharge Statement "   I spent 20 minutes discharging the patient. This time was spent on the day of discharge. I had direct contact with the patient on the day of discharge. Additional documentation is required if more than 30 minutes were spent on discharge.     Discharge Medications:  See after visit summary for reconciled discharge medications provided to patient and family.

## 2024-06-08 NOTE — DISCHARGE INSTR - AVS FIRST PAGE
No alcohol or carbonated drinks for the first 4 weeks  Eat small, frequent meals. We recommend 6 meals a day (every 2-3 hours).  Take small bites, chew food well before swallowing.  Avoid foods that cause stomach gas and distention: corn, beans, broccoli, peas, onions, cabbage, cauliflower, and lentils.  Avoid breads, tough meats, tomato and citrus based foods, fried, spicy, nuts, seeds, and raw vegetables.  Sit upright during meals and for 4 hours following every meal.   Continue to take your anti-reflux medications as prescribed.      Gently wash incisions with soap and water. Do not apply any creams/lotions/ or ointments. Do not soak incisions. You may shower.   Maintain Full liquid diet for 4-5 days following discharge. Then, advance to soft foods as tolerated until your follow up appointment in 2 weeks.

## 2024-06-08 NOTE — PROGRESS NOTES
"Progress Note - Thoracic Surgery   Cayetano Lucero 68 y.o. male MRN: [id number]  Unit/Bed#: [facility] 427-01 Encounter: [id number]    Assessment:  68 yoM s/p PEHR on 6/7 for paraesophageal hernia with gastroesophageal reflux     Afebrile, hypertensive at times with systolic >170, HR wnl     cc    Labs wnl  Plan:  - CLD  - UGI 6/8  - OK for DC on FLD pending UGI  - Analgesia and antiemetic as needed  - DVT ppx  - OOB as able and I-S use  - Dispo planning    Subjective/Objective     Subjective: Seen and examined.  Some chest pain ovn EKG at baseline now and resolved this AM.  Patient denies any pain at this time.     Objective:     Blood pressure 165/77, pulse 78, temperature 97.6 °F (36.4 °C), temperature source Axillary, resp. rate 18, height 6' 1\" (1.854 m), weight 104 kg (229 lb), SpO2 95%.,Body mass index is 30.21 kg/m².      Intake/Output Summary (Last 24 hours) at 6/8/2024 0026  Last data filed at 6/7/2024 1827  Gross per 24 hour   Intake 2000 ml   Output 50 ml   Net 1950 ml       Invasive Devices       Central Venous Catheter Line  Duration             Port A Cath 01/01/10 Right Chest 5271 days              Peripheral Intravenous Line  Duration             Peripheral IV 06/07/24 Left Antecubital <1 day                    Physical Exam:   General - no acute distress, responsive and cooperative  CV - warm, regular rate  Pulm - normal work of breathing, no respiratory distress  Abd - soft, nondistended, nontender; no palpable masses, inicisions cdi and well approximated  Neuro - m/s grossly intact, cn grossly intact  Ext - moving all extremities   "

## 2024-06-08 NOTE — RESPIRATORY THERAPY NOTE
RT Protocol Note  Cayetano Lucero 68 y.o. male MRN: [id number]  Unit/Bed#: [facility] 427-01 Encounter: [id number]    Assessment    Active Problems:    Paraesophageal hernia with gastroesophageal reflux      Home Pulmonary Medications:  MDI PRN albuterol       Past Medical History:   Diagnosis Date    Allergic     Anemia     Arthritis     CAD (coronary artery disease) 2004    Cancer (HCC)     Leukemia    Cellulitis of scalp     CKD (chronic kidney disease) stage 3, GFR 30-59 ml/min (HCC)     CLL (chronic lymphocytic leukemia) (HCC)     COPD (chronic obstructive pulmonary disease) (HCC)     Diabetes mellitus (HCC)     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    Dyslipidemia     Edema extremities     Esophageal ulcer     H/O blood clots     Hemolytic anemia (HCC)     Hiatal hernia     History of TIA (transient ischemic attack)     Hyperlipidemia     Hypertension     Listeria infection 2018    Multiple gastric ulcers     Myocardial infarction (HCC) 2004    acute    Neutropenia (HCC)     Obese     Recurrent sinusitis     Thrombocytopenia (HCC)     Vertigo      Social History     Socioeconomic History    Marital status: /Civil Union     Spouse name: None    Number of children: None    Years of education: None    Highest education level: None   Occupational History    Occupation: [place] [employer]    Occupation: retired    Tobacco Use    Smoking status: Former     Current packs/day: 0.00     Average packs/day: 2.0 packs/day for 33.0 years (66.0 ttl pk-yrs)     Types: Cigarettes     Start date:      Quit date:      Years since quittin.4     Passive exposure: Past    Smokeless tobacco: Never   Vaping Use    Vaping status: Never Used   Substance and Sexual Activity    Alcohol use: Yes     Alcohol/week: 2.0 standard drinks of alcohol     Types: 2 Cans of beer per week     Comment: social use as per [employer]    Drug use: Never    Sexual activity: None   Other Topics Concern    None   Social History Narrative    None     Social  "Determinants of Health     Financial Resource Strain: Low Risk  (4/4/2024)    Overall Financial Resource Strain (CARDIA)     Difficulty of Paying Living Expenses: Not hard at all   Food Insecurity: Food Insecurity Present (4/4/2024)    Hunger Vital Sign     Worried About Running Out of Food in the Last Year: Sometimes true     Ran Out of Food in the Last Year: Sometimes true   Transportation Needs: No Transportation Needs (4/4/2024)    PRAPARE - Transportation     Lack of Transportation (Medical): No     Lack of Transportation (Non-Medical): No   Physical Activity: Not on file   Stress: Not on file   Social Connections: Not on file   Intimate Partner Violence: Not on file   Housing Stability: Low Risk  (4/4/2024)    Housing Stability Vital Sign     Unable to Pay for Housing in the Last Year: No     Number of Times Moved in the Last Year: 1     Homeless in the Last Year: No       Subjective         Objective    Physical Exam:   Assessment Type: Assess only  General Appearance: Drowsy  Respiratory Pattern: Normal  Chest Assessment: Chest expansion symmetrical  Bilateral Breath Sounds: Clear, Diminished  O2 Device: NC    Vitals:  Blood pressure 162/72, pulse 79, temperature 97.5 °F (36.4 °C), resp. rate 18, height 6' 1\" (1.854 m), weight 104 kg (229 lb), SpO2 96%.          Imaging and other studies: I have personally reviewed pertinent reports.   and I have personally reviewed pertinent films in PACS    O2 Device: NC     Plan    Respiratory Plan: Home Bronchodilator Patient pathway  Airway Clearance Plan: Incentive Spirometer, Discontinue Protocol     Resp Comments: PT ordered on ACP/Txs So resp protocol done at bedside. PT is here post op for hernia repair. PT has hx of COPD and takes PRN albuterol MDI at home per pt. PT has dimished but clear lungs and no distress noted. PT currently sating 95% on 3L. PT will start IS/Deep brething on his own post op. No need for scheduled txs at this time. Pt ha no c/o breathing " issues. Will leave PRN MDI for pt and d/c protocols

## 2024-06-09 ENCOUNTER — TELEPHONE (OUTPATIENT)
Dept: OTHER | Facility: OTHER | Age: 69
End: 2024-06-09

## 2024-06-10 ENCOUNTER — TELEPHONE (OUTPATIENT)
Dept: HEMATOLOGY ONCOLOGY | Facility: CLINIC | Age: 69
End: 2024-06-10

## 2024-06-10 NOTE — TELEPHONE ENCOUNTER
Patient Call    Who are you speaking with? Patient    If it is not the patient, are they listed on an active communication consent form? N/A   What is the reason for this call? Patient calling to speak with Dr. Mejia regarding his chemo treatment.  He needs to reschedule is appt.  He just had surgery and he is weak and recovering    Does this require a call back? yes   If a call back is required, please list best call back number [phone]   If a call back is required, advise that a message will be forwarded to their care team and someone will return their call as soon as possible.   Did you relay this information to the patient? yes

## 2024-06-10 NOTE — TELEPHONE ENCOUNTER
Patient canceled treatment for this week due to not feeling well from hernia surgery.  We will skip this cycle and patient will resume next treatment as scheduled on 6/25/24

## 2024-06-12 ENCOUNTER — TELEPHONE (OUTPATIENT)
Dept: CARDIAC SURGERY | Facility: CLINIC | Age: 69
End: 2024-06-12

## 2024-06-12 PROCEDURE — 88305 TISSUE EXAM BY PATHOLOGIST: CPT | Performed by: PATHOLOGY

## 2024-06-12 NOTE — TELEPHONE ENCOUNTER
Surgeon/Procedure/Date: [place] 6/7/2024  Post op appt:   6/19/2024          1. Diet: (full liquid diet for 4-5 days following discharge/transition to soft foods on day 5)           (continue to take PPI until follow up visit. Sit upright for 4 hours after eating, prior to going to bed)           (nausea, vomiting, difficulty or pain with swallowing?)  Denies N/V. He does report some difficulty with soft food and minimal pain swallowing.  Advised patient he could go back to full liquids for the day and try soft foods tomorrow.  Patient to call for increased pain or difficulty swallowing.    2. Constipation: (Yes: colace 100 bid, senokot 2 tabs qhs or senokot S 2 tabs qhs. No BM: Dulcolax/Miralax/suppository)    Denies    3. Pain Management: (Tylenol 650 mg q6 hrs, Oxycodone 5-10 mg q4-6 hrs prn, +/- Advil 600 mg TID prn)   Controlled with Oxycodone      4. Fever/Chills/Cough/Shortness of breath: (Call for temps over 100.4)  Denies fever or chills. He does have a cough and reports he has COPD. He does have SOB with Coughing.  Encouraged to cell for temp.>100.4, increase in cough or SOB.    5. Incisions: (Redness/warmth/drainage? Shower okay, no baths)  Clean and dry. No drainage.      6. Activity: (Walking/stationary biking. No lifting over 10-15 lbs) Patient walking. Aware not to lift more than 10 pounds.  Reviewed post-op appointment date and time.

## 2024-06-19 ENCOUNTER — OFFICE VISIT (OUTPATIENT)
Dept: CARDIAC SURGERY | Facility: CLINIC | Age: 69
End: 2024-06-19

## 2024-06-19 VITALS
HEIGHT: 73 IN | TEMPERATURE: 97.2 F | HEART RATE: 74 BPM | SYSTOLIC BLOOD PRESSURE: 122 MMHG | BODY MASS INDEX: 28.4 KG/M2 | RESPIRATION RATE: 14 BRPM | WEIGHT: 214.29 LBS | OXYGEN SATURATION: 98 % | DIASTOLIC BLOOD PRESSURE: 81 MMHG

## 2024-06-19 DIAGNOSIS — K44.9 PARAESOPHAGEAL HERNIA WITH GASTROESOPHAGEAL REFLUX: Primary | ICD-10-CM

## 2024-06-19 DIAGNOSIS — K21.9 PARAESOPHAGEAL HERNIA WITH GASTROESOPHAGEAL REFLUX: Primary | ICD-10-CM

## 2024-06-19 PROCEDURE — 99024 POSTOP FOLLOW-UP VISIT: CPT | Performed by: THORACIC SURGERY (CARDIOTHORACIC VASCULAR SURGERY)

## 2024-06-19 RX ORDER — ACETAMINOPHEN 325 MG/1
650 TABLET ORAL ONCE
OUTPATIENT
Start: 2024-07-09

## 2024-06-19 RX ORDER — SODIUM CHLORIDE 9 MG/ML
20 INJECTION, SOLUTION INTRAVENOUS ONCE
OUTPATIENT
Start: 2024-07-09

## 2024-06-19 NOTE — ASSESSMENT & PLAN NOTE
Mr. Lucero is approximately 2 weeks out from paraesophageal hernia repair with mesh placement and partial toupet fundoplication.  Patient is recovering well from surgery.  Patient instructed that he may continue to liberalize his diet further and try more acid producing foods.  Patient reminded to continue to chew food well, take small bites, eat smaller more frequent meals, and remain upright for several hours after eating. Discussed that patient may stop taking his acid suppression medication.  Instructed patient to call if having recurrent heartburn, regurgitation, dysphagia. Patient to return in 4 weeks for continued post op care and be seen on the AP schedule and then will have a 6-month post op visit with Dr. Gonsales.

## 2024-06-19 NOTE — PROGRESS NOTES
Assessment/Plan:    Paraesophageal hernia with gastroesophageal reflux  Mr. Lucero is approximately 2 weeks out from paraesophageal hernia repair with mesh placement and partial toupet fundoplication.  Patient is recovering well from surgery.  Patient instructed that he may continue to liberalize his diet further and try more acid producing foods.  Patient reminded to continue to chew food well, take small bites, eat smaller more frequent meals, and remain upright for several hours after eating. Discussed that patient may stop taking his acid suppression medication.  Instructed patient to call if having recurrent heartburn, regurgitation, dysphagia. Patient to return in 4 weeks for continued post op care and be seen on the [facility] schedule and then will have a 6-month post op visit with Dr. Gonsales.       Diagnoses and all orders for this visit:    Paraesophageal hernia with gastroesophageal reflux          Thoracic History     Diagnosis: Type III paraesophageal hernia  Procedures/Surgeries: 6/7/2024 robotic assisted laparoscopic paraesophageal hernia repair with absorbable mesh placement and partial toupet fundoplication, EGD   Pathology: 6/7/2024 gastric fat pad fibroadipose tissue and 4 benign lymph nodes contained in posterior esophageal fat    Cancer Staging   No matching staging information was found for the patient.    Oncology History   CLL (chronic lymphocytic leukemia) ([facility])   8/22/2017 -  Chemotherapy    chlorambucil (LEUKERAN), 0.5 mg/kg, Oral, Every 14 days, 6 of 6 cycles  obinutuzumab (GAZYVA) day 1 IVPB, 100 mg, Intravenous, Once, 1 of 1 cycle  obinutuzumab (GAZYVA) day 2 titrated infusion, 900 mg, Intravenous, Once, 1 of 1 cycle  obinutuzumab (GAZYVA) subsequent titrated infusion, 1,000 mg, Intravenous, Once, 69 of 75 cycles  Administration: 1,000 mg (5/21/2019), 1,000 mg (6/18/2019), 1,000 mg (7/16/2019), 1,000 mg (8/13/2019), 1,000 mg (9/10/2019), 1,000 mg (10/8/2019), 1,000 mg (11/5/2019), 1,000 mg  (12/3/2019), 1,000 mg (12/31/2019), 1,000 mg (1/28/2020), 1,000 mg (2/25/2020), 1,000 mg (3/24/2020), 1,000 mg (4/21/2020), 1,000 mg (5/19/2020), 1,000 mg (6/16/2020), 1,000 mg (7/14/2020), 1,000 mg (8/11/2020), 1,000 mg (9/9/2020), 1,000 mg (10/6/2020), 1,000 mg (11/3/2020), 1,000 mg (12/1/2020), 1,000 mg (1/26/2021), 1,000 mg (12/29/2020), 1,000 mg (2/23/2021), 1,000 mg (3/23/2021), 1,000 mg (4/20/2021), 1,000 mg (5/18/2021), 1,000 mg (6/15/2021), 1,000 mg (7/13/2021), 1,000 mg (8/10/2021), 1,000 mg (9/7/2021), 1,000 mg (10/5/2021), 1,000 mg (11/2/2021), 1,000 mg (11/30/2021), 1,000 mg (12/28/2021), 1,000 mg (1/25/2022), 1,000 mg (2/22/2022), 1,000 mg (3/22/2022), 1,000 mg (5/17/2022), 1,000 mg (6/14/2022), 1,000 mg (7/12/2022), 1,000 mg (8/9/2022), 1,000 mg (9/6/2022), 1,000 mg (10/4/2022), 1,000 mg (11/1/2022), 1,000 mg (11/29/2022), 1,000 mg (1/24/2023), 1,000 mg (2/21/2023), 1,000 mg (3/21/2023), 1,000 mg (4/18/2023), 1,000 mg (5/16/2023), 1,000 mg (6/13/2023), 1,000 mg (7/11/2023), 1,000 mg (8/8/2023), 1,000 mg (9/5/2023), 1,000 mg (10/3/2023), 1,000 mg (10/31/2023), 1,000 mg (11/28/2023), 1,000 mg (1/23/2024), 1,000 mg (2/20/2024), 1,000 mg (3/19/2024), 1,000 mg (4/16/2024), 1,000 mg (5/14/2024)     4/24/2018 Initial Diagnosis    CLL (chronic lymphocytic leukemia) ([facility])              Subjective:    Patient ID: Cayetano Lucero is a 68 y.o. male.    HPI    Cayetano Lucero is a 68 y.o. male presents today for a post operative visit. Patient is approximately 2 weeks out from paraesophageal hernia repair on 6/7/24.  Patient had an uncomplicated hospital course and was discharged on postoperative day 1 passing swallow study with passage of contrast into the stomach and no evidence of leak and then tolerating full liquid diet.    On discussion today, patient reports he feels restricted with the foods he is eating given that he is following the diet instructions to avoid acid producing and spicy foods and also trying  to manage his diabetes.  He is however eating a wide variety of textured foods including eggs, hamburger, sandwiches, rice, and soups.  Patient reports that with respect to bread he is taking very small bites and chewing well.  He reports he has had occasional feeling of food stuck in his upper esophagus which promptly clears with drinking water; reports this last happened 2 days ago.  Denies heartburn, regurgitation, nausea.  Reports he is belching more.  Does state he has some right lower quadrant abdominal pain near his lowest incision and has taken some occasional Tylenol.  Reports he has some baseline shortness of breath in light of his COPD but has been somewhat more fatigued recently which was reinforced by patient's spouse.  Patient did start his oral chemotherapy agent yesterday and is going to have an upcoming infusion for his CLL starting next week. Denies chest pain, fevers, chills.  Patient has continued his acid suppression medications.    The following portions of the patient's history were reviewed and updated as appropriate: allergies, current medications, past family history, past medical history, past social history, past surgical history and problem list.    Review of Systems   Constitutional:  Positive for fatigue. Negative for chills and fever.   HENT:  Negative for trouble swallowing.    Respiratory:  Positive for cough (chronic) and shortness of breath.    Cardiovascular:  Negative for chest pain.   Gastrointestinal:  Positive for abdominal pain (mild over RLQ). Negative for nausea.        Denies heartburn, regurgitation.            Objective:   Physical Exam  Constitutional:       General: He is not in acute distress.  HENT:      Head: Normocephalic and atraumatic.      Nose: Nose normal.   Eyes:      Extraocular Movements: Extraocular movements intact.   Cardiovascular:      Rate and Rhythm: Normal rate and regular rhythm.   Pulmonary:      Effort: Pulmonary effort is normal.      Breath  "sounds: Rhonchi (mild in b/l lung bases; remainder of lung fields clear to auscultation b/l) present.   Abdominal:      Palpations: Abdomen is soft.      Tenderness: There is abdominal tenderness (mild TTP at RLQ near inferiormost port site incision; remainder of abdomen is non tender to palpation).      Comments: Laparoscopic port sites are scabbing over - clean, dry, intact, and non-erythematous. Mild ecchymosis surrounding most inferior port site.    Musculoskeletal:      Right lower leg: No edema.      Left lower leg: No edema.   Skin:     General: Skin is warm and dry.     /81 (BP Location: Right arm, Patient Position: Sitting, Cuff Size: Standard)   Pulse 74   Temp (!) 97.2 °F (36.2 °C) (Temporal)   Resp 14   Ht 6' 1\" (1.854 m)   Wt 97.2 kg (214 lb 4.6 oz)   SpO2 98%   BMI 28.27 kg/m²     No Chest XR results available for this patient.   No CT Chest results available for this patient.  No CT Chest,Abdomen,Pelvis results available for this patient.   No NM PET CT results available for this patient.   FL esophagram complete    Result Date: 6/8/2024  Narrative BARIUM SWALLOW / ESOPHAGRAM INDICATION: s/p PEHR. History of partial fundoplication COMPARISON: CT chest from 5/6/2022 TECHNIQUE: The patient was given effervescent granules and barium by mouth and images of the esophagus were obtained. IMAGES: 3+ additional screen saves FLUOROSCOPY TIME: 58 seconds FINDINGS: Imaging was performed utilizing 50 cc of oral Omnipaque 300.  projection demonstrates air-fluid level in the stomach. Port-A-Cath is evident. There is no significant delay of passage of contrast into the stomach. There is expected smooth narrowing related to edema at the GE junction. No leak is identified. Small amount of contrast was retained in the lower one third of the esophagus briefly. 10-minute delayed image demonstrates contrast extending into the duodenum.     Impression No evidence of leak status post hernia surgery. " Workstation performed: [id number]

## 2024-06-25 ENCOUNTER — HOSPITAL ENCOUNTER (OUTPATIENT)
Dept: INFUSION CENTER | Facility: CLINIC | Age: 69
Discharge: HOME | End: 2024-06-25
Payer: MEDICARE

## 2024-06-25 VITALS
DIASTOLIC BLOOD PRESSURE: 89 MMHG | TEMPERATURE: 97.7 F | WEIGHT: 213.41 LBS | RESPIRATION RATE: 16 BRPM | BODY MASS INDEX: 28.16 KG/M2 | HEART RATE: 76 BPM | SYSTOLIC BLOOD PRESSURE: 136 MMHG

## 2024-06-25 DIAGNOSIS — D80.1 HYPOGAMMAGLOBULINEMIA, ACQUIRED (HCC): ICD-10-CM

## 2024-06-25 DIAGNOSIS — C91.10 CLL (CHRONIC LYMPHOCYTIC LEUKEMIA) (HCC): ICD-10-CM

## 2024-06-25 DIAGNOSIS — R76.8 LOW SERUM IGG FOR AGE: Primary | ICD-10-CM

## 2024-06-25 RX ORDER — ACETAMINOPHEN 325 MG/1
650 TABLET ORAL ONCE
OUTPATIENT
Start: 2024-06-26

## 2024-06-25 RX ORDER — ACETAMINOPHEN 325 MG/1
650 TABLET ORAL ONCE
Status: COMPLETED | OUTPATIENT
Start: 2024-06-25 | End: 2024-06-25

## 2024-06-25 RX ORDER — SODIUM CHLORIDE 9 MG/ML
20 INJECTION, SOLUTION INTRAVENOUS ONCE
OUTPATIENT
Start: 2024-06-26

## 2024-06-25 RX ORDER — SODIUM CHLORIDE 9 MG/ML
20 INJECTION, SOLUTION INTRAVENOUS ONCE
Status: COMPLETED | OUTPATIENT
Start: 2024-06-25 | End: 2024-06-25

## 2024-06-25 RX ADMIN — SODIUM CHLORIDE 20 ML/HR: 0.9 INJECTION, SOLUTION INTRAVENOUS at 08:27

## 2024-06-25 RX ADMIN — DEXAMETHASONE SODIUM PHOSPHATE 4 MG: 10 INJECTION, SOLUTION INTRAMUSCULAR; INTRAVENOUS at 09:04

## 2024-06-25 RX ADMIN — DIPHENHYDRAMINE HYDROCHLORIDE 12.5 MG: 50 INJECTION, SOLUTION INTRAMUSCULAR; INTRAVENOUS at 08:43

## 2024-06-25 RX ADMIN — Medication 20 G: at 09:41

## 2024-06-25 RX ADMIN — ACETAMINOPHEN 650 MG: 325 TABLET ORAL at 08:26

## 2024-06-25 NOTE — PROGRESS NOTES
Cayetano Lucero  tolerated treatment well with no complications.      Cayetano Lucero is aware of future appt on 7/9 at 8am.     AVS printed and given to Cayetano Lucero:    No (Declined by Cayetano Lucero)

## 2024-06-27 NOTE — TELEPHONE ENCOUNTER
Received fax for a refill:      Admelog Solostar Pen 3ML 5's  100U/ML  90- day supply  3-refills    Prescriber: PATRICK Cedeno    Pharmacy: [facility]

## 2024-07-01 ENCOUNTER — TELEPHONE (OUTPATIENT)
Dept: CARDIAC SURGERY | Facility: CLINIC | Age: 69
End: 2024-07-01

## 2024-07-01 NOTE — TELEPHONE ENCOUNTER
"Patient calling with medication question. Feeling great from surgery. No issues. Patient wanting clarification. GI physician, Dr. Hansen, ordered Voquezna. Dr. Gonsales wanted patient to stop taking \"any drugs that works on stomach\". Patient is calling to confirm if he should or should not be taking acid suppression medication. Patient reports he has received conflicting information and just wants to make sure he is doing the right thing. Requesting the two providers discuss to conclude on a decision.   "

## 2024-07-02 ENCOUNTER — TELEPHONE (OUTPATIENT)
Dept: CARDIAC SURGERY | Facility: CLINIC | Age: 69
End: 2024-07-02

## 2024-07-02 NOTE — TELEPHONE ENCOUNTER
Returned call to patient regarding acid suppression medication. Patient expressed that he is extremely frustrated by the fact that at his last office visit with our office we advised to stop taking any type of acid suppression medication, and then recently he receives information from his GI provider, Dr. Winifred Hansen D.O. stating he should be Voquezna. He reported that he corresponded with the GI office via [employer] to advise that our office said he could stop taking and again was told that he should be taking it. I attempted to explain to the patient that if he is not having symptoms of reflux or GERD after his surgical intervention he would not need to take any type of PPI and that the GI provider may be treating him for a different diagnosis. He denies any symptoms of acid reflux at this time and wants the providers themselves to discuss treatment going forward. I advised patient that I would follow up with GI provider. Placed call to [facility] at [phone] (GI-Colorectal POD) Explained situation and asked for clarification regarding Voquezna. I requested a call back so that I am able to follow up with patient directly.

## 2024-07-02 NOTE — TELEPHONE ENCOUNTER
Returned call to patient to follow up on resolution of patient message received this morning. Dr. Hansen contacted patient to confirm that he is able to discontinue taking pantoprazole and voquezna. Dr. Hansen followed up with [employer] message below.  Pt acknowledged understanding and is in agreement with plan. Thanked me for assistance in matter.         Winifred Hansen, DO   to Cayetano Lucero         7/2/24 10:50 AM  As we discussed on the phone, you can stop your pantoprazole and your voquezna and does not need either medication now post surgery since you are feeling so well.     Let me know if you have any other questions for me.    This [employer] message has not been read.  This encounter is not signed. The conversation may still be moriah

## 2024-07-08 ENCOUNTER — TELEPHONE (OUTPATIENT)
Dept: ENDOCRINOLOGY | Facility: CLINIC | Age: 69
End: 2024-07-08

## 2024-07-08 NOTE — TELEPHONE ENCOUNTER
Received Fax from [facility] for a refill on:    ADMELOG SOLOSTAR PEN 3ML 5's  100U/ML  3 refills    Tried to route to POD but a hard stop came up saying it is not covered in formulary.     Please advise.    Thanks!

## 2024-07-09 ENCOUNTER — HOSPITAL ENCOUNTER (OUTPATIENT)
Dept: INFUSION CENTER | Facility: CLINIC | Age: 69
Discharge: HOME | End: 2024-07-09
Payer: MEDICARE

## 2024-07-09 VITALS
SYSTOLIC BLOOD PRESSURE: 147 MMHG | DIASTOLIC BLOOD PRESSURE: 90 MMHG | RESPIRATION RATE: 18 BRPM | HEART RATE: 66 BPM | TEMPERATURE: 98.3 F

## 2024-07-09 DIAGNOSIS — C91.10 CLL (CHRONIC LYMPHOCYTIC LEUKEMIA) (HCC): Primary | ICD-10-CM

## 2024-07-09 DIAGNOSIS — Z79.4 TYPE 2 DIABETES MELLITUS WITH HYPERGLYCEMIA, WITH LONG-TERM CURRENT USE OF INSULIN (HCC): ICD-10-CM

## 2024-07-09 DIAGNOSIS — E11.65 TYPE 2 DIABETES MELLITUS WITH HYPERGLYCEMIA, WITH LONG-TERM CURRENT USE OF INSULIN (HCC): ICD-10-CM

## 2024-07-09 DIAGNOSIS — Z79.4 TYPE 2 DIABETES MELLITUS WITH HYPERGLYCEMIA, WITH LONG-TERM CURRENT USE OF INSULIN (HCC): Primary | ICD-10-CM

## 2024-07-09 DIAGNOSIS — E11.65 TYPE 2 DIABETES MELLITUS WITH HYPERGLYCEMIA, WITH LONG-TERM CURRENT USE OF INSULIN (HCC): Primary | ICD-10-CM

## 2024-07-09 LAB
ALBUMIN SERPL BCG-MCNC: 4.1 G/DL (ref 3.5–5)
ALP SERPL-CCNC: 42 U/L (ref 34–104)
ALT SERPL W P-5'-P-CCNC: 44 U/L (ref 7–52)
ANION GAP SERPL CALCULATED.3IONS-SCNC: 7 MMOL/L (ref 4–13)
AST SERPL W P-5'-P-CCNC: 35 U/L (ref 13–39)
BASOPHILS # BLD MANUAL: 0 THOUSAND/UL (ref 0–0.1)
BASOPHILS NFR MAR MANUAL: 0 % (ref 0–1)
BILIRUB SERPL-MCNC: 0.56 MG/DL (ref 0.2–1)
BUN SERPL-MCNC: 18 MG/DL (ref 5–25)
CALCIUM SERPL-MCNC: 8.9 MG/DL (ref 8.4–10.2)
CHLORIDE SERPL-SCNC: 106 MMOL/L (ref 96–108)
CO2 SERPL-SCNC: 27 MMOL/L (ref 21–32)
CREAT SERPL-MCNC: 0.99 MG/DL (ref 0.6–1.3)
EOSINOPHIL # BLD MANUAL: 0.14 THOUSAND/UL (ref 0–0.4)
EOSINOPHIL NFR BLD MANUAL: 3 % (ref 0–6)
ERYTHROCYTE [DISTWIDTH] IN BLOOD BY AUTOMATED COUNT: 13.9 % (ref 11.6–15.1)
GFR SERPL CREATININE-BSD FRML MDRD: 77 ML/MIN/1.73SQ M
GLUCOSE SERPL-MCNC: 90 MG/DL (ref 65–140)
HCT VFR BLD AUTO: 42 % (ref 36.5–49.3)
HGB BLD-MCNC: 13.6 G/DL (ref 12–17)
IGG SERPL-MCNC: 509 MG/DL (ref 635–1741)
LYMPHOCYTES # BLD AUTO: 1.05 THOUSAND/UL (ref 0.6–4.47)
LYMPHOCYTES # BLD AUTO: 22 % (ref 14–44)
MCH RBC QN AUTO: 30 PG (ref 26.8–34.3)
MCHC RBC AUTO-ENTMCNC: 32.4 G/DL (ref 31.4–37.4)
MCV RBC AUTO: 93 FL (ref 82–98)
MONOCYTES # BLD AUTO: 0.86 THOUSAND/UL (ref 0–1.22)
MONOCYTES NFR BLD: 18 % (ref 4–12)
NEUTROPHILS # BLD MANUAL: 2.71 THOUSAND/UL (ref 1.85–7.62)
NEUTS SEG NFR BLD AUTO: 57 % (ref 43–75)
PLATELET # BLD AUTO: 145 THOUSANDS/UL (ref 149–390)
PLATELET BLD QL SMEAR: ABNORMAL
PMV BLD AUTO: 11.9 FL (ref 8.9–12.7)
POTASSIUM SERPL-SCNC: 3.6 MMOL/L (ref 3.5–5.3)
PROT SERPL-MCNC: 6 G/DL (ref 6.4–8.4)
RBC # BLD AUTO: 4.53 MILLION/UL (ref 3.88–5.62)
RBC MORPH BLD: NORMAL
RETICS # AUTO: ABNORMAL 10*3/UL (ref 14356–105094)
RETICS # CALC: 2.18 % (ref 0.37–1.87)
SODIUM SERPL-SCNC: 140 MMOL/L (ref 135–147)
WBC # BLD AUTO: 4.75 THOUSAND/UL (ref 4.31–10.16)

## 2024-07-09 PROCEDURE — 85007 BL SMEAR W/DIFF WBC COUNT: CPT

## 2024-07-09 PROCEDURE — 80053 COMPREHEN METABOLIC PANEL: CPT

## 2024-07-09 PROCEDURE — 96413 CHEMO IV INFUSION 1 HR: CPT

## 2024-07-09 PROCEDURE — 82784 ASSAY IGA/IGD/IGG/IGM EACH: CPT | Performed by: PHYSICIAN ASSISTANT

## 2024-07-09 PROCEDURE — 96415 CHEMO IV INFUSION ADDL HR: CPT

## 2024-07-09 PROCEDURE — 85027 COMPLETE CBC AUTOMATED: CPT

## 2024-07-09 PROCEDURE — 85045 AUTOMATED RETICULOCYTE COUNT: CPT | Performed by: PHYSICIAN ASSISTANT

## 2024-07-09 PROCEDURE — 96367 TX/PROPH/DG ADDL SEQ IV INF: CPT

## 2024-07-09 RX ORDER — ACETAMINOPHEN 325 MG/1
650 TABLET ORAL ONCE
Status: COMPLETED | OUTPATIENT
Start: 2024-07-09 | End: 2024-07-09

## 2024-07-09 RX ORDER — INSULIN LISPRO 100 [IU]/ML
INJECTION, SOLUTION INTRAVENOUS; SUBCUTANEOUS
Qty: 45 ML | Refills: 1 | Status: SHIPPED | OUTPATIENT
Start: 2024-07-09 | End: 2024-07-09 | Stop reason: SDUPTHER

## 2024-07-09 RX ORDER — SODIUM CHLORIDE 9 MG/ML
20 INJECTION, SOLUTION INTRAVENOUS ONCE
Status: COMPLETED | OUTPATIENT
Start: 2024-07-09 | End: 2024-07-09

## 2024-07-09 RX ORDER — INSULIN LISPRO 100 [IU]/ML
INJECTION, SOLUTION INTRAVENOUS; SUBCUTANEOUS
Qty: 45 ML | Refills: 1 | Status: SHIPPED | OUTPATIENT
Start: 2024-07-09

## 2024-07-09 RX ADMIN — DIPHENHYDRAMINE HYDROCHLORIDE 25 MG: 50 INJECTION, SOLUTION INTRAMUSCULAR; INTRAVENOUS at 08:44

## 2024-07-09 RX ADMIN — DEXAMETHASONE SODIUM PHOSPHATE 20 MG: 10 INJECTION, SOLUTION INTRAMUSCULAR; INTRAVENOUS at 08:22

## 2024-07-09 RX ADMIN — SODIUM CHLORIDE 20 ML/HR: 0.9 INJECTION, SOLUTION INTRAVENOUS at 08:22

## 2024-07-09 RX ADMIN — ACETAMINOPHEN 650 MG: 325 TABLET ORAL at 08:22

## 2024-07-09 RX ADMIN — OBINUTUZUMAB 1000 MG: 1000 INJECTION, SOLUTION, CONCENTRATE INTRAVENOUS at 09:53

## 2024-07-09 NOTE — TELEPHONE ENCOUNTER
Patient was able to get the Cequr insulin delivery patch but is unable to use it right now due to having surgery and the incisions on his stomach. He also said he does not use the [facility] mail order pharmacy for his admelog due to how long it takes them to get to him.

## 2024-07-09 NOTE — TELEPHONE ENCOUNTER
OK, [facility]  I will change to local pharmacy for admelog pens.  When he is healed and ready to go set up education for Cequr patch training, will need insulin vials for that prior to starting.

## 2024-07-09 NOTE — TELEPHONE ENCOUNTER
I will send admelog pen to pharmacy  IF not covered, he should let us know.    At the last visit he discussed The Cequr insulin delivery patch which uses insulin vials-- has he been able to get this? If so, I will refer for Diabetes education.

## 2024-07-09 NOTE — PROGRESS NOTES
Pt tolerated treatment without incident. Pt declined AVS, aware of next appt 7/23 at 8am for IVIG.

## 2024-07-15 ENCOUNTER — TELEPHONE (OUTPATIENT)
Age: 69
End: 2024-07-15

## 2024-07-15 ENCOUNTER — TELEPHONE (OUTPATIENT)
Dept: CARDIAC SURGERY | Facility: CLINIC | Age: 69
End: 2024-07-15

## 2024-07-15 NOTE — TELEPHONE ENCOUNTER
Patient calling in to check on 7/23 appointment, he stated he is in chemotherapy for hours starting at 8 am on Tuesdays and usually does not have other appts on that day. He thinks this was scheduled in error as sometimes he will see Dr Mejia prior to chemotherapy and would like a call back to see if that was what they wanted him to do or to reschedule if not.

## 2024-07-15 NOTE — TELEPHONE ENCOUNTER
I called Cayetano and I spoke with him to reschedule his upcoming appointment on 7/17 for his 4 week follow up with Dr. Gonsales. Cayetano confirmed he is rescheduling due to appointment conflicts. I verbalized understanding and rescheduled this for Thursday, 7/18. All appointment details reviewed with him. He verbalized understanding and voiced appreciation of my call.

## 2024-07-15 NOTE — TELEPHONE ENCOUNTER
Cayetano is calling because he would like to reschedule his post op appointment with Dr Gonsales on 7/17/24.

## 2024-07-16 DIAGNOSIS — E78.2 MIXED HYPERLIPIDEMIA: ICD-10-CM

## 2024-07-17 RX ORDER — FENOFIBRATE 145 MG/1
TABLET, COATED ORAL
Qty: 90 TABLET | Refills: 0 | Status: SHIPPED | OUTPATIENT
Start: 2024-07-17

## 2024-07-18 ENCOUNTER — OFFICE VISIT (OUTPATIENT)
Dept: CARDIAC SURGERY | Facility: CLINIC | Age: 69
End: 2024-07-18

## 2024-07-18 VITALS
DIASTOLIC BLOOD PRESSURE: 80 MMHG | SYSTOLIC BLOOD PRESSURE: 146 MMHG | WEIGHT: 218.26 LBS | RESPIRATION RATE: 14 BRPM | HEIGHT: 73 IN | HEART RATE: 69 BPM | BODY MASS INDEX: 28.93 KG/M2 | OXYGEN SATURATION: 97 % | TEMPERATURE: 97.7 F

## 2024-07-18 DIAGNOSIS — Z87.19 S/P REPAIR OF PARAESOPHAGEAL HERNIA: Primary | ICD-10-CM

## 2024-07-18 DIAGNOSIS — Z98.890 S/P REPAIR OF PARAESOPHAGEAL HERNIA: Primary | ICD-10-CM

## 2024-07-18 PROBLEM — K21.9 PARAESOPHAGEAL HERNIA WITH GASTROESOPHAGEAL REFLUX: Status: RESOLVED | Noted: 2024-06-07 | Resolved: 2024-07-18

## 2024-07-18 PROBLEM — K44.9 PARAESOPHAGEAL HERNIA WITH GASTROESOPHAGEAL REFLUX: Status: RESOLVED | Noted: 2024-06-07 | Resolved: 2024-07-18

## 2024-07-18 PROCEDURE — 99024 POSTOP FOLLOW-UP VISIT: CPT | Performed by: PHYSICIAN ASSISTANT

## 2024-07-18 NOTE — PROGRESS NOTES
Thoracic Follow-Up  Assessment/Plan:    S/P repair of paraesophageal hernia  Cayetano is six weeks out from PEHR and toupet fundoplication. He is doing very well and is very happy with his results. He is tolerating a regular diet without any difficulty. He is off of PPI. He takes OTC Pepcid occasionally due to fear of nocturnal symptoms although he's not had these since surgery.  We discussed avoiding weight gain to prevent recurrent hernia. We will see him back six months out from surgery. All questions answered.         Diagnoses and all orders for this visit:    S/P repair of paraesophageal hernia          Thoracic History          Subjective:    Patient ID: Cayetano Lucero is a 68 y.o. male.    HPI Patient is s/p robotic assisted PEHR with toupet fundoplication on 6/7/2024.    He is tolerating a regular diet without difficulty. He denies dysphagia, heartburn, regurgitation, n/v, d/c or abdominal pain. His biggest pre-op complaint was food sticking and regurgitation- he is very thrilled that these have both resolved with surgery.    He is off of PPI. He occasionally takes Pepcid 20mg prior to bed due to fear of nocturnal symptoms, although he's not had any reflux symptoms since surgery.     No 30 day readmission.     The following portions of the patient's history were reviewed and updated as appropriate: allergies, current medications, past family history, past medical history, past social history, past surgical history, and problem list.    Review of Systems   Constitutional:  Negative for chills, diaphoresis and unexpected weight change.   HENT: Negative.     Eyes: Negative.    Respiratory:  Negative for cough, shortness of breath and wheezing.    Cardiovascular:  Negative for chest pain and leg swelling.   Gastrointestinal:  Negative for abdominal pain, diarrhea and nausea.   Endocrine: Negative.    Genitourinary: Negative.    Musculoskeletal: Negative.    Skin: Negative.    Allergic/Immunologic: Negative.   "  Neurological: Negative.    Psychiatric/Behavioral: Negative.     All other systems reviewed and are negative.        Objective:   Physical Exam  Vitals reviewed.   Constitutional:       General: He is not in acute distress.     Appearance: Normal appearance. He is not ill-appearing.   HENT:      Head: Normocephalic.      Nose: Nose normal.      Mouth/Throat:      Mouth: Mucous membranes are moist.   Eyes:      Extraocular Movements: Extraocular movements intact.   Cardiovascular:      Rate and Rhythm: Normal rate.      Heart sounds: Normal heart sounds.   Pulmonary:      Effort: Pulmonary effort is normal.      Breath sounds: Normal breath sounds. No wheezing, rhonchi or rales.   Abdominal:      Palpations: Abdomen is soft.      Comments: Incisions healing well   Musculoskeletal:         General: No swelling. Normal range of motion.   Skin:     General: Skin is warm.   Neurological:      General: No focal deficit present.      Mental Status: He is alert and oriented to person, place, and time.   Psychiatric:         Mood and Affect: Mood normal.     /80 (BP Location: Right arm, Patient Position: Sitting, Cuff Size: Standard)   Pulse 69   Temp 97.7 °F (36.5 °C) (Temporal)   Resp 14   Ht 6' 1\" (1.854 m)   Wt 99 kg (218 lb 4.1 oz)   SpO2 97%   BMI 28.80 kg/m²     No Chest XR results available for this patient.   No CT Chest results available for this patient.  No CT Chest,Abdomen,Pelvis results available for this patient.   No NM PET CT results available for this patient.   FL esophagram complete    Result Date: 6/8/2024  Narrative BARIUM SWALLOW / ESOPHAGRAM INDICATION: s/p PEHR. History of partial fundoplication COMPARISON: CT chest from 5/6/2022 TECHNIQUE: The patient was given effervescent granules and barium by mouth and images of the esophagus were obtained. IMAGES: 3+ additional screen saves FLUOROSCOPY TIME: 58 seconds FINDINGS: Imaging was performed utilizing 50 cc of oral Omnipaque 300.  " projection demonstrates air-fluid level in the stomach. Port-A-Cath is evident. There is no significant delay of passage of contrast into the stomach. There is expected smooth narrowing related to edema at the GE junction. No leak is identified. Small amount of contrast was retained in the lower one third of the esophagus briefly. 10-minute delayed image demonstrates contrast extending into the duodenum.     Impression No evidence of leak status post hernia surgery. Workstation performed: [id number]

## 2024-07-18 NOTE — ASSESSMENT & PLAN NOTE
Cayetano is six weeks out from [facility] and toupet fundoplication. He is doing very well and is very happy with his results. He is tolerating a regular diet without any difficulty. He is off of PPI. He takes OTC Pepcid occasionally due to fear of nocturnal symptoms although he's not had these since surgery.  We discussed avoiding weight gain to prevent recurrent hernia. We will see him back six months out from surgery. All questions answered.

## 2024-07-18 NOTE — LETTER
July 18, 2024     Immanuel Gonsales MD  [address]  [address]  [place] [address]    Patient: Cayetano Lucero   YOB: 1955   Date of Visit: 7/18/2024       Dear Dr. Gonsales:    SUNSHINE he's doing really well. Happy. Will see you six months post-op.       Sincerely,        [facility]        CC: No Recipients    Amylyn Jena Mortimer, PA-C  7/18/2024 12:30 PM  Sign when Signing Visit  Thoracic Follow-Up  Assessment/Plan:    S/P repair of paraesophageal hernia  Cayetano is six weeks out from PEHR and toupet fundoplication. He is doing very well and is very happy with his results. He is tolerating a regular diet without any difficulty. He is off of PPI. He takes OTC Pepcid occasionally due to fear of nocturnal symptoms although he's not had these since surgery.  We discussed avoiding weight gain to prevent recurrent hernia. We will see him back six months out from surgery. All questions answered.         Diagnoses and all orders for this visit:    S/P repair of paraesophageal hernia          Thoracic History         Subjective:    Patient ID: Cayetano Lucero is a 68 y.o. male.    HPI Patient is s/p robotic assisted PEHR with toupet fundoplication on 6/7/2024.    He is tolerating a regular diet without difficulty. He denies dysphagia, heartburn, regurgitation, n/v, d/c or abdominal pain. His biggest pre-op complaint was food sticking and regurgitation- he is very thrilled that these have both resolved with surgery.    He is off of PPI. He occasionally takes Pepcid 20mg prior to bed due to fear of nocturnal symptoms, although he's not had any reflux symptoms since surgery.     No 30 day readmission.     The following portions of the patient's history were reviewed and updated as appropriate: allergies, current medications, past family history, past medical history, past social history, past surgical history, and problem list.    Review of Systems   Constitutional:  Negative for chills,  "diaphoresis and unexpected weight change.   HENT: Negative.     Eyes: Negative.    Respiratory:  Negative for cough, shortness of breath and wheezing.    Cardiovascular:  Negative for chest pain and leg swelling.   Gastrointestinal:  Negative for abdominal pain, diarrhea and nausea.   Endocrine: Negative.    Genitourinary: Negative.    Musculoskeletal: Negative.    Skin: Negative.    Allergic/Immunologic: Negative.    Neurological: Negative.    Psychiatric/Behavioral: Negative.     All other systems reviewed and are negative.        Objective:   Physical Exam  Vitals reviewed.   Constitutional:       General: He is not in acute distress.     Appearance: Normal appearance. He is not ill-appearing.   HENT:      Head: Normocephalic.      Nose: Nose normal.      Mouth/Throat:      Mouth: Mucous membranes are moist.   Eyes:      Extraocular Movements: Extraocular movements intact.   Cardiovascular:      Rate and Rhythm: Normal rate.      Heart sounds: Normal heart sounds.   Pulmonary:      Effort: Pulmonary effort is normal.      Breath sounds: Normal breath sounds. No wheezing, rhonchi or rales.   Abdominal:      Palpations: Abdomen is soft.      Comments: Incisions healing well   Musculoskeletal:         General: No swelling. Normal range of motion.   Skin:     General: Skin is warm.   Neurological:      General: No focal deficit present.      Mental Status: He is alert and oriented to person, place, and time.   Psychiatric:         Mood and Affect: Mood normal.     /80 (BP Location: Right arm, Patient Position: Sitting, Cuff Size: Standard)   Pulse 69   Temp 97.7 °F (36.5 °C) (Temporal)   Resp 14   Ht 6' 1\" (1.854 m)   Wt 99 kg (218 lb 4.1 oz)   SpO2 97%   BMI 28.80 kg/m²     No Chest XR results available for this patient.   No CT Chest results available for this patient.  No CT Chest,Abdomen,Pelvis results available for this patient.   No NM PET CT results available for this patient.   FL esophagram " complete    Result Date: 6/8/2024  Narrative BARIUM SWALLOW / ESOPHAGRAM INDICATION: s/p PEHR. History of partial fundoplication COMPARISON: CT chest from 5/6/2022 TECHNIQUE: The patient was given effervescent granules and barium by mouth and images of the esophagus were obtained. IMAGES: 3+ additional screen saves FLUOROSCOPY TIME: 58 seconds FINDINGS: Imaging was performed utilizing 50 cc of oral Omnipaque 300.  projection demonstrates air-fluid level in the stomach. Port-A-Cath is evident. There is no significant delay of passage of contrast into the stomach. There is expected smooth narrowing related to edema at the GE junction. No leak is identified. Small amount of contrast was retained in the lower one third of the esophagus briefly. 10-minute delayed image demonstrates contrast extending into the duodenum.     Impression No evidence of leak status post hernia surgery. Workstation performed: [id number]

## 2024-07-19 ENCOUNTER — TELEPHONE (OUTPATIENT)
Dept: HEMATOLOGY ONCOLOGY | Facility: CLINIC | Age: 69
End: 2024-07-19

## 2024-07-19 NOTE — TELEPHONE ENCOUNTER
SPOKE WITH PT REGARDING NEEDING TO R/S APPT FROM 8/7 TO 8/14 DUE TO PROOTHI ROUNDING, PT VERBALIZED UNDERSTANDING, REQUESTED MY CHART MESSAGE WITH NEW APPT DATE AND TIME.

## 2024-07-23 ENCOUNTER — HOSPITAL ENCOUNTER (OUTPATIENT)
Dept: INFUSION CENTER | Facility: CLINIC | Age: 69
Discharge: HOME | End: 2024-07-23
Payer: MEDICARE

## 2024-07-23 VITALS
RESPIRATION RATE: 18 BRPM | SYSTOLIC BLOOD PRESSURE: 162 MMHG | DIASTOLIC BLOOD PRESSURE: 93 MMHG | WEIGHT: 214.95 LBS | HEART RATE: 97 BPM | BODY MASS INDEX: 28.36 KG/M2 | TEMPERATURE: 97.5 F

## 2024-07-23 DIAGNOSIS — C91.10 CLL (CHRONIC LYMPHOCYTIC LEUKEMIA) (HCC): ICD-10-CM

## 2024-07-23 DIAGNOSIS — D80.1 HYPOGAMMAGLOBULINEMIA, ACQUIRED (HCC): ICD-10-CM

## 2024-07-23 DIAGNOSIS — R76.8 LOW SERUM IGG FOR AGE: Primary | ICD-10-CM

## 2024-07-23 PROCEDURE — 96365 THER/PROPH/DIAG IV INF INIT: CPT

## 2024-07-23 PROCEDURE — 96366 THER/PROPH/DIAG IV INF ADDON: CPT

## 2024-07-23 PROCEDURE — 96367 TX/PROPH/DG ADDL SEQ IV INF: CPT

## 2024-07-23 RX ORDER — SODIUM CHLORIDE 9 MG/ML
20 INJECTION, SOLUTION INTRAVENOUS ONCE
OUTPATIENT
Start: 2024-08-20

## 2024-07-23 RX ORDER — SODIUM CHLORIDE 9 MG/ML
20 INJECTION, SOLUTION INTRAVENOUS ONCE
Status: COMPLETED | OUTPATIENT
Start: 2024-07-23 | End: 2024-07-23

## 2024-07-23 RX ORDER — ACETAMINOPHEN 325 MG/1
650 TABLET ORAL ONCE
OUTPATIENT
Start: 2024-08-20

## 2024-07-23 RX ORDER — ACETAMINOPHEN 325 MG/1
650 TABLET ORAL ONCE
Status: COMPLETED | OUTPATIENT
Start: 2024-07-23 | End: 2024-07-23

## 2024-07-23 RX ADMIN — Medication 20 G: at 09:51

## 2024-07-23 RX ADMIN — DEXAMETHASONE SODIUM PHOSPHATE 4 MG: 100 INJECTION INTRAMUSCULAR; INTRAVENOUS at 08:26

## 2024-07-23 RX ADMIN — ACETAMINOPHEN 650 MG: 325 TABLET ORAL at 08:26

## 2024-07-23 RX ADMIN — DIPHENHYDRAMINE HYDROCHLORIDE 12.5 MG: 50 INJECTION, SOLUTION INTRAMUSCULAR; INTRAVENOUS at 08:54

## 2024-07-23 RX ADMIN — SODIUM CHLORIDE 20 ML/HR: 9 INJECTION, SOLUTION INTRAVENOUS at 08:25

## 2024-07-23 NOTE — PROGRESS NOTES
Cayetano Lucero  tolerated IVIG well with no complications.      Cayetano Lucero is aware of future appt on Tuesday Aug 6, 2024 8:00 AM .     AVS declined by Cayetano Lucero.

## 2024-07-26 NOTE — TELEPHONE ENCOUNTER
Patient called in because he noticed he does not have a new refill of fenofibrate. [facility]. [facility] is going to [facility] to see if there is anything they can do to fix the issue and if he needs to he will call us back.

## 2024-08-05 RX ORDER — SODIUM CHLORIDE 9 MG/ML
20 INJECTION, SOLUTION INTRAVENOUS ONCE
Status: CANCELLED | OUTPATIENT
Start: 2024-08-06

## 2024-08-05 RX ORDER — ACETAMINOPHEN 325 MG/1
650 TABLET ORAL ONCE
Status: CANCELLED | OUTPATIENT
Start: 2024-08-06

## 2024-08-06 ENCOUNTER — HOSPITAL ENCOUNTER (OUTPATIENT)
Dept: INFUSION CENTER | Facility: CLINIC | Age: 69
Discharge: HOME | End: 2024-08-06
Payer: MEDICARE

## 2024-08-06 VITALS
DIASTOLIC BLOOD PRESSURE: 95 MMHG | HEIGHT: 73 IN | BODY MASS INDEX: 29.39 KG/M2 | RESPIRATION RATE: 18 BRPM | SYSTOLIC BLOOD PRESSURE: 169 MMHG | WEIGHT: 221.78 LBS | HEART RATE: 74 BPM | TEMPERATURE: 97.4 F

## 2024-08-06 DIAGNOSIS — C91.10 CLL (CHRONIC LYMPHOCYTIC LEUKEMIA) (HCC): Primary | ICD-10-CM

## 2024-08-06 LAB
ALBUMIN SERPL BCG-MCNC: 4 G/DL (ref 3.5–5)
ALP SERPL-CCNC: 44 U/L (ref 34–104)
ALT SERPL W P-5'-P-CCNC: 39 U/L (ref 7–52)
ANION GAP SERPL CALCULATED.3IONS-SCNC: 7 MMOL/L (ref 4–13)
AST SERPL W P-5'-P-CCNC: 33 U/L (ref 13–39)
BASOPHILS # BLD MANUAL: 0 THOUSAND/UL (ref 0–0.1)
BASOPHILS NFR MAR MANUAL: 0 % (ref 0–1)
BILIRUB SERPL-MCNC: 0.5 MG/DL (ref 0.2–1)
BUN SERPL-MCNC: 18 MG/DL (ref 5–25)
CALCIUM SERPL-MCNC: 9 MG/DL (ref 8.4–10.2)
CHLORIDE SERPL-SCNC: 107 MMOL/L (ref 96–108)
CO2 SERPL-SCNC: 25 MMOL/L (ref 21–32)
CREAT SERPL-MCNC: 1.01 MG/DL (ref 0.6–1.3)
EOSINOPHIL # BLD MANUAL: 0.67 THOUSAND/UL (ref 0–0.4)
EOSINOPHIL NFR BLD MANUAL: 12 % (ref 0–6)
ERYTHROCYTE [DISTWIDTH] IN BLOOD BY AUTOMATED COUNT: 14.6 % (ref 11.6–15.1)
GFR SERPL CREATININE-BSD FRML MDRD: 75 ML/MIN/1.73SQ M
GLUCOSE SERPL-MCNC: 132 MG/DL (ref 65–140)
HCT VFR BLD AUTO: 44.6 % (ref 36.5–49.3)
HGB BLD-MCNC: 14.2 G/DL (ref 12–17)
IGG SERPL-MCNC: 543 MG/DL (ref 635–1741)
LG PLATELETS BLD QL SMEAR: PRESENT
LYMPHOCYTES # BLD AUTO: 1 THOUSAND/UL (ref 0.6–4.47)
LYMPHOCYTES # BLD AUTO: 15 % (ref 14–44)
MCH RBC QN AUTO: 29.8 PG (ref 26.8–34.3)
MCHC RBC AUTO-ENTMCNC: 31.8 G/DL (ref 31.4–37.4)
MCV RBC AUTO: 94 FL (ref 82–98)
MONOCYTES # BLD AUTO: 0.72 THOUSAND/UL (ref 0–1.22)
MONOCYTES NFR BLD: 13 % (ref 4–12)
MYELOCYTE ABSOLUTE CT: 0.06 THOUSAND/UL (ref 0–0.1)
MYELOCYTES NFR BLD MANUAL: 1 % (ref 0–1)
NEUTROPHILS # BLD MANUAL: 3.12 THOUSAND/UL (ref 1.85–7.62)
NEUTS BAND NFR BLD MANUAL: 1 % (ref 0–8)
NEUTS SEG NFR BLD AUTO: 55 % (ref 43–75)
PLATELET # BLD AUTO: 171 THOUSANDS/UL (ref 149–390)
PLATELET BLD QL SMEAR: ADEQUATE
PMV BLD AUTO: 11.4 FL (ref 8.9–12.7)
POTASSIUM SERPL-SCNC: 3.9 MMOL/L (ref 3.5–5.3)
PROT SERPL-MCNC: 6.3 G/DL (ref 6.4–8.4)
RBC # BLD AUTO: 4.77 MILLION/UL (ref 3.88–5.62)
RBC MORPH BLD: NORMAL
RETICS # AUTO: NORMAL 10*3/UL (ref 14356–105094)
RETICS # CALC: 1.78 % (ref 0.37–1.87)
SODIUM SERPL-SCNC: 139 MMOL/L (ref 135–147)
VARIANT LYMPHS # BLD AUTO: 3 %
WBC # BLD AUTO: 5.57 THOUSAND/UL (ref 4.31–10.16)

## 2024-08-06 PROCEDURE — 80053 COMPREHEN METABOLIC PANEL: CPT | Performed by: INTERNAL MEDICINE

## 2024-08-06 PROCEDURE — 96415 CHEMO IV INFUSION ADDL HR: CPT

## 2024-08-06 PROCEDURE — 96413 CHEMO IV INFUSION 1 HR: CPT

## 2024-08-06 PROCEDURE — 85027 COMPLETE CBC AUTOMATED: CPT | Performed by: INTERNAL MEDICINE

## 2024-08-06 PROCEDURE — 96367 TX/PROPH/DG ADDL SEQ IV INF: CPT

## 2024-08-06 PROCEDURE — 85045 AUTOMATED RETICULOCYTE COUNT: CPT | Performed by: PHYSICIAN ASSISTANT

## 2024-08-06 PROCEDURE — 82784 ASSAY IGA/IGD/IGG/IGM EACH: CPT | Performed by: PHYSICIAN ASSISTANT

## 2024-08-06 PROCEDURE — 85007 BL SMEAR W/DIFF WBC COUNT: CPT | Performed by: INTERNAL MEDICINE

## 2024-08-06 RX ORDER — SODIUM CHLORIDE 9 MG/ML
20 INJECTION, SOLUTION INTRAVENOUS ONCE
Status: COMPLETED | OUTPATIENT
Start: 2024-08-06 | End: 2024-08-06

## 2024-08-06 RX ORDER — ACETAMINOPHEN 325 MG/1
650 TABLET ORAL ONCE
Status: COMPLETED | OUTPATIENT
Start: 2024-08-06 | End: 2024-08-06

## 2024-08-06 RX ADMIN — ACETAMINOPHEN 650 MG: 325 TABLET ORAL at 08:44

## 2024-08-06 RX ADMIN — SODIUM CHLORIDE 20 ML/HR: 0.9 INJECTION, SOLUTION INTRAVENOUS at 08:39

## 2024-08-06 RX ADMIN — DIPHENHYDRAMINE HYDROCHLORIDE 25 MG: 50 INJECTION, SOLUTION INTRAMUSCULAR; INTRAVENOUS at 09:04

## 2024-08-06 RX ADMIN — OBINUTUZUMAB 1000 MG: 1000 INJECTION, SOLUTION, CONCENTRATE INTRAVENOUS at 10:02

## 2024-08-06 RX ADMIN — DEXAMETHASONE SODIUM PHOSPHATE 20 MG: 100 INJECTION INTRAMUSCULAR; INTRAVENOUS at 08:41

## 2024-08-06 NOTE — PLAN OF CARE
Problem: Potential for Falls  Goal: Patient will remain free of falls  Description: INTERVENTIONS:  - Educate patient/family on patient safety including physical limitations  - Instruct patient to call for assistance with activity   - Consult OT/PT to assist with strengthening/mobility   - Keep Call bell within reach  - Keep bed low and locked with side rails adjusted as appropriate  - Keep care items and personal belongings within reach  - Initiate and maintain comfort rounds  - Make Fall Risk Sign visible to staff  - Offer Toileting every    Hours, in advance of need  - Initiate/Maintain   alarm  - Obtain necessary fall risk management equipment:     - Apply yellow socks and bracelet for high fall risk patients  - Consider moving patient to room near nurses station  Outcome: Progressing

## 2024-08-06 NOTE — PROGRESS NOTES
Cayetano Lucero presented for Gazyva therapy, and he tolerated treatment well with no complications.      Cayetano Lucero is aware of future appt on Tuesday Aug 20, 2024 8:00 AM .     AVS declined by Cayetano Lucero.

## 2024-08-10 DIAGNOSIS — C91.10 CLL (CHRONIC LYMPHOCYTIC LEUKEMIA) (HCC): ICD-10-CM

## 2024-08-12 RX ORDER — PREDNISONE 5 MG/1
5 TABLET ORAL DAILY
Qty: 90 TABLET | Refills: 0 | Status: SHIPPED | OUTPATIENT
Start: 2024-08-12

## 2024-08-14 ENCOUNTER — OFFICE VISIT (OUTPATIENT)
Dept: HEMATOLOGY ONCOLOGY | Facility: CLINIC | Age: 69
End: 2024-08-14
Payer: MEDICARE

## 2024-08-14 VITALS
DIASTOLIC BLOOD PRESSURE: 80 MMHG | RESPIRATION RATE: 16 BRPM | OXYGEN SATURATION: 97 % | BODY MASS INDEX: 28.89 KG/M2 | WEIGHT: 218 LBS | TEMPERATURE: 97.1 F | HEIGHT: 73 IN | HEART RATE: 86 BPM | SYSTOLIC BLOOD PRESSURE: 126 MMHG

## 2024-08-14 DIAGNOSIS — M81.8 DRUG-INDUCED OSTEOPOROSIS: ICD-10-CM

## 2024-08-14 DIAGNOSIS — Z79.4 TYPE 2 DIABETES MELLITUS WITH DIABETIC POLYNEUROPATHY, WITH LONG-TERM CURRENT USE OF INSULIN (HCC): ICD-10-CM

## 2024-08-14 DIAGNOSIS — D59.10 AUTOIMMUNE HEMOLYTIC ANEMIA (HCC): ICD-10-CM

## 2024-08-14 DIAGNOSIS — D69.6 THROMBOCYTOPENIA (HCC): ICD-10-CM

## 2024-08-14 DIAGNOSIS — N18.30 STAGE 3 CHRONIC KIDNEY DISEASE, UNSPECIFIED WHETHER STAGE 3A OR 3B CKD (HCC): ICD-10-CM

## 2024-08-14 DIAGNOSIS — J44.9 CHRONIC OBSTRUCTIVE PULMONARY DISEASE, UNSPECIFIED COPD TYPE (HCC): ICD-10-CM

## 2024-08-14 DIAGNOSIS — E11.42 TYPE 2 DIABETES MELLITUS WITH DIABETIC POLYNEUROPATHY, WITH LONG-TERM CURRENT USE OF INSULIN (HCC): ICD-10-CM

## 2024-08-14 DIAGNOSIS — G89.3 CANCER RELATED PAIN: ICD-10-CM

## 2024-08-14 DIAGNOSIS — D75.829 HIT (HEPARIN-INDUCED THROMBOCYTOPENIA) (HCC): ICD-10-CM

## 2024-08-14 DIAGNOSIS — R76.8 LOW SERUM IGG FOR AGE: ICD-10-CM

## 2024-08-14 DIAGNOSIS — I10 PRIMARY HYPERTENSION: ICD-10-CM

## 2024-08-14 DIAGNOSIS — D80.1 HYPOGAMMAGLOBULINEMIA, ACQUIRED (HCC): ICD-10-CM

## 2024-08-14 DIAGNOSIS — T50.905A DRUG-INDUCED OSTEOPOROSIS: ICD-10-CM

## 2024-08-14 DIAGNOSIS — C91.10 CLL (CHRONIC LYMPHOCYTIC LEUKEMIA) (HCC): Primary | ICD-10-CM

## 2024-08-14 DIAGNOSIS — I25.10 CORONARY ARTERY DISEASE INVOLVING NATIVE CORONARY ARTERY OF NATIVE HEART WITHOUT ANGINA PECTORIS: ICD-10-CM

## 2024-08-14 PROCEDURE — 99214 OFFICE O/P EST MOD 30 MIN: CPT | Performed by: INTERNAL MEDICINE

## 2024-08-14 PROCEDURE — G2211 COMPLEX E/M VISIT ADD ON: HCPCS | Performed by: INTERNAL MEDICINE

## 2024-08-14 NOTE — PROGRESS NOTES
Hematology/Oncology Outpatient Follow-up  Cayetano Lucero 68 y.o. male 1955 [id number]    Date:  8/14/2024      Assessment and Plan:      HPI:  Patient is 67-year-old male with longstanding history of CLL diagnosedMore than 20 years ago, currently on 140 mg ibrutinib daily , obinutuzumab and IVIG infusion once a month for stage IV CLL.  He is also on 5 mg prednisone and folic acid.   Patient had multiple lines of therapy when he was on fluorouracil based chemotherapy and pentostatin based chemotherapy.  He had increased hemolysis and complications.  His recent chemotherapy was with cyclophosphamide,Vincristine, prednisone, Rituxan in dec 2012. He was again started on Rituxan and prednisone due to recurrent hemolysis.  Patient also has history of heparin-induced thrombocytopenia.    Oncology History   CLL (chronic lymphocytic leukemia) ([facility])   8/22/2017 -  Chemotherapy    chlorambucil (LEUKERAN), 0.5 mg/kg, Oral, Every 14 days, 6 of 6 cycles  obinutuzumab (GAZYVA) day 1 IVPB, 100 mg, Intravenous, Once, 1 of 1 cycle  obinutuzumab (GAZYVA) day 2 titrated infusion, 900 mg, Intravenous, Once, 1 of 1 cycle  obinutuzumab (GAZYVA) subsequent titrated infusion, 1,000 mg, Intravenous, Once, 71 of 75 cycles  Administration: 1,000 mg (5/21/2019), 1,000 mg (6/18/2019), 1,000 mg (7/16/2019), 1,000 mg (8/13/2019), 1,000 mg (9/10/2019), 1,000 mg (10/8/2019), 1,000 mg (11/5/2019), 1,000 mg (12/3/2019), 1,000 mg (12/31/2019), 1,000 mg (1/28/2020), 1,000 mg (2/25/2020), 1,000 mg (3/24/2020), 1,000 mg (4/21/2020), 1,000 mg (5/19/2020), 1,000 mg (6/16/2020), 1,000 mg (7/14/2020), 1,000 mg (8/11/2020), 1,000 mg (9/9/2020), 1,000 mg (10/6/2020), 1,000 mg (11/3/2020), 1,000 mg (12/1/2020), 1,000 mg (1/26/2021), 1,000 mg (12/29/2020), 1,000 mg (2/23/2021), 1,000 mg (3/23/2021), 1,000 mg (4/20/2021), 1,000 mg (5/18/2021), 1,000 mg (6/15/2021), 1,000 mg (7/13/2021), 1,000 mg (8/10/2021), 1,000 mg (9/7/2021), 1,000 mg (10/5/2021), 1,000 mg  (11/2/2021), 1,000 mg (11/30/2021), 1,000 mg (12/28/2021), 1,000 mg (1/25/2022), 1,000 mg (2/22/2022), 1,000 mg (3/22/2022), 1,000 mg (5/17/2022), 1,000 mg (6/14/2022), 1,000 mg (7/12/2022), 1,000 mg (8/9/2022), 1,000 mg (9/6/2022), 1,000 mg (10/4/2022), 1,000 mg (11/1/2022), 1,000 mg (11/29/2022), 1,000 mg (1/24/2023), 1,000 mg (2/21/2023), 1,000 mg (3/21/2023), 1,000 mg (4/18/2023), 1,000 mg (5/16/2023), 1,000 mg (6/13/2023), 1,000 mg (7/11/2023), 1,000 mg (8/8/2023), 1,000 mg (9/5/2023), 1,000 mg (10/3/2023), 1,000 mg (10/31/2023), 1,000 mg (11/28/2023), 1,000 mg (1/23/2024), 1,000 mg (2/20/2024), 1,000 mg (3/19/2024), 1,000 mg (4/16/2024), 1,000 mg (5/14/2024), 1,000 mg (7/9/2024), 1,000 mg (8/6/2024)     4/24/2018 Initial Diagnosis    CLL (chronic lymphocytic leukemia) ([facility])         Interval history:    ROS: Review of Systems    Past Medical History:   Diagnosis Date    Allergic     Anemia     Arthritis     CAD (coronary artery disease) 2004    Cancer (HCC)     Leukemia    Cellulitis of scalp     CKD (chronic kidney disease) stage 3, GFR 30-59 ml/min ([facility])     CLL (chronic lymphocytic leukemia) ([facility])     COPD (chronic obstructive pulmonary disease) (HCC)     Diabetes mellitus (HCC)     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    Dyslipidemia     Edema extremities     Esophageal ulcer     H/O blood clots     Hemolytic anemia (HCC)     Hiatal hernia     History of TIA (transient ischemic attack)     Hyperlipidemia     Hypertension     Listeria infection 2018    Multiple gastric ulcers     Myocardial infarction (HCC) 2004    acute    Neutropenia (HCC)     Obese     Recurrent sinusitis     Thrombocytopenia (HCC)     Vertigo        Past Surgical History:   Procedure Laterality Date    ARTHROSCOPIC REPAIR ACL      lt knee    CARDIAC SURGERY      cardiac cath with stent    FOOT SURGERY      IR PORT CHECK  5/17/2019    KNEE ARTHROSCOPY      OTHER SURGICAL HISTORY      cardiac cath lesion 1, 1st adjunct treat device: stent    PORTACATH  PLACEMENT      OK ESOPHAGOGASTRODUODENOSCOPY TRANSORAL DIAGNOSTIC N/A 10/5/2017    Procedure: ESOPHAGOGASTRODUODENOSCOPY (EGD) with bx;  Surgeon: Winifred Hansen DO;  Location: [facility];  Service: Gastroenterology    OK ESOPHAGOGASTRODUODENOSCOPY TRANSORAL DIAGNOSTIC N/A 3/8/2018    Procedure: ESOPHAGOGASTRODUODENOSCOPY (EGD) with biopsy;  Surgeon: Winifred Hansen DO;  Location: [facility];  Service: Gastroenterology    OK ESOPHAGOGASTRODUODENOSCOPY TRANSORAL DIAGNOSTIC N/A 6/20/2018    Procedure: ESOPHAGOGASTRODUODENOSCOPY (EGD) with Dilation;  Surgeon: Winifred Hansen DO;  Location: [facility];  Service: Gastroenterology    OK ESOPHAGOGASTRODUODENOSCOPY TRANSORAL DIAGNOSTIC N/A 10/18/2018    Procedure: ESOPHAGOGASTRODUODENOSCOPY (EGD) with dilation;  Surgeon: Edu Mejía MD;  Location: [facility];  Service: Gastroenterology    OK ESOPHAGOGASTRODUODENOSCOPY TRANSORAL DIAGNOSTIC N/A 6/7/2024    Procedure: ESOPHAGOGASTRODUODENOSCOPY (EGD);  Surgeon: Immanuel Gonsales MD;  [facility]: [facility];  Service: Thoracic    OK ESOPHAGOSCOPY FLEXIBLE TRANSORAL WITH BIOPSY N/A 9/2/2016    Procedure: ESOPHAGOGASTRODUODENOSCOPY (EGD); ESOPHAGEAL DILATION; ESOPHAGEAL BIOPSY;  Surgeon: Abdi Holcomb MD;  Location: [facility];  Service: Thoracic    OK LAPS RPR PARAESPHGL HRNA INCL FUNDPLSTY W/O MESH N/A 6/7/2024    Procedure: REPAIR HERNIA PARAESOPHAGEAL LAPAROSCOPIC W ROBOTICS TYPE 3. PARTIAL FUNDOPLICATION. MESH.;  Surgeon: Immanuel Gonsales MD;  Location: [facility];  Service: Thoracic    TONSILLECTOMY      UPPER GASTROINTESTINAL ENDOSCOPY      x 6       Social History     Socioeconomic History    Marital status: /Civil Union     Spouse name: Not on file    Number of children: Not on file    Years of education: Not on file    Highest education level: Not on file   Occupational History    Occupation: [employer]    Occupation: retired    Tobacco Use    Smoking status: Former     Current packs/day: 0.00      Average packs/day: 2.0 packs/day for 33.0 years (66.0 ttl pk-yrs)     Types: Cigarettes     Start date:      Quit date:      Years since quittin.6     Passive exposure: Past    Smokeless tobacco: Never   Vaping Use    Vaping status: Never Used   Substance and Sexual Activity    Alcohol use: Yes     Alcohol/week: 2.0 standard drinks of alcohol     Types: 2 Cans of beer per week     Comment: social use as per [employer]    Drug use: Never    Sexual activity: Not on file   Other Topics Concern    Not on file   Social History Narrative    Not on file     Social Determinants of Health     Financial Resource Strain: Low Risk  (2024)    Overall Financial Resource Strain (CARDIA)     Difficulty of Paying Living Expenses: Not hard at all   Food Insecurity: Food Insecurity Present (2024)    Hunger Vital Sign     Worried About Running Out of Food in the Last Year: Sometimes true     Ran Out of Food in the Last Year: Sometimes true   Transportation Needs: No Transportation Needs (2024)    PRAPARE - Transportation     Lack of Transportation (Medical): No     Lack of Transportation (Non-Medical): No   Physical Activity: Not on file   Stress: Not on file   Social Connections: Not on file   Intimate Partner Violence: Not on file   Housing Stability: Low Risk  (2024)    Housing Stability Vital Sign     Unable to Pay for Housing in the Last Year: No     Number of Times Moved in the Last Year: 1     Homeless in the Last Year: No       Family History   Problem Relation Age of Onset    Leukemia Mother         chronic    Colon polyps Mother         sidmoid    Parkinsonism Father        Allergies   Allergen Reactions    Heparin Other (See Comments)     Other reaction(s): Blood Disorders  clot    Macrolides And Ketolides Other (See Comments)     Interacts with other meds he is taking    Simvastatin Myalgia         Current Outpatient Medications:     acyclovir (ZOVIRAX) 400 MG tablet, TAKE 1 TABLET TWICE A DAY,  Disp: 60 tablet, Rfl: 11    albuterol (2.5 mg/3 mL) 0.083 % nebulizer solution, Take 3 mL (2.5 mg total) by nebulization every 6 (six) hours as needed for wheezing or shortness of breath, Disp: 180 mL, Rfl: 5    amLODIPine (NORVASC) 10 mg tablet, TAKE 1 TABLET DAILY, Disp: 90 tablet, Rfl: 5    aspirin 81 MG tablet, Take 81 mg by mouth daily Every other day, Disp: , Rfl:     benzonatate (TESSALON) 200 MG capsule, TAKE 1 CAPSULE THREE TIMES A DAY AS NEEDED FOR COUGH (Patient not taking: Reported on 7/18/2024), Disp: 20 capsule, Rfl: 3    Blood Glucose Monitoring Suppl (FreeStyle Freedom Lite) w/Device KIT, Use 3 (three) times a day, Disp: 1 kit, Rfl: 0    citalopram (CeleXA) 40 mg tablet, TAKE 1 TABLET DAILY, Disp: 90 tablet, Rfl: 1    Continuous Blood Gluc  (FreeStyle Ashley 2 [place]id) JOE, Use to scan sensor premeals and at bedtime, Disp: 1 each, Rfl: 1    Continuous Blood Gluc Sensor (FreeStyle Ashley 2 Sensor) MISC, Apply 1 sensor every 14 days. Use as directed with Freestyle Ashley 2 reader., Disp: 2 each, Rfl: 2    Diclofenac Sodium (VOLTAREN) 1 %, Apply 4 g topically 4 (four) times a day, Disp: 100 g, Rfl: 0    fenofibrate (TRICOR) 145 mg tablet, TAKE 1 TABLET DAILY, Disp: 90 tablet, Rfl: 0    folic acid (FOLVITE) 1 mg tablet, Take 800 mcg by mouth daily , Disp: , Rfl:     gabapentin (NEURONTIN) 600 MG tablet, TAKE 1 TABLET DAILY, Disp: 90 tablet, Rfl: 5    glucose blood (FREESTYLE LITE) test strip, Check blood sugar 3 times a day, Disp: 300 strip, Rfl: 1    Ibrutinib 140 MG TABS, Take 140 mg by mouth daily, Disp: 28 tablet, Rfl: 5    Injection Device for Insulin (CeQur Simplicity 2U) JOE, Use 1 patch every 3 days, disp 1 box of 10 patches for 30 days supply, Disp: 1 each, Rfl: 3    Injection Device for Insulin (CeQur Simplicity ) MISC, Use to insert simplicity device., Disp: 1 each, Rfl: 1    insulin degludec (Tresiba FlexTouch) 100 units/mL injection pen, Inject 18 Units under the skin daily at  bedtime, Disp: 15 mL, Rfl: 3    insulin lispro (Admelog SoloStar) 100 units/mL injection pen, 12-14 units before meals, Disp: 45 mL, Rfl: 1    insulin lispro (Admelog) 100 units/mL injection, Use up to 65 units per day as directed via Intellinote device. (Patient not taking: Reported on 6/19/2024), Disp: 20 mL, Rfl: 3    Insulin Pen Needle (BD Pen Needle Inez U/F) 32G X 4 MM MISC, Use 3 (three) times a day, Disp: 300 each, Rfl: 0    Lancets (FREESTYLE) lancets, Use as instructed--test 2 times a day  E 11.9, Disp: 100 each, Rfl: 0    Lancets (freestyle) lancets, TEST BLOOD SUGAR 3 TIMES A DAY, Disp: 300 each, Rfl: 1    LORazepam (ATIVAN) 0.5 mg tablet, Take 1 tablet (0.5 mg total) by mouth daily as needed for anxiety, Disp: 30 tablet, Rfl: 0    losartan (COZAAR) 100 MG tablet, TAKE 1 TABLET DAILY, Disp: 90 tablet, Rfl: 1    mometasone-formoterol (Dulera) 200-5 MCG/ACT inhaler, Inhale 2 puffs 2 (two) times a day Rinse mouth after use., Disp: 13 g, Rfl: 1    multivitamin (THERAGRAN) TABS, Take 1 tablet by mouth daily, Disp: , Rfl:     naloxone (NARCAN) 4 mg/0.1 mL nasal spray, Administer 1 spray into a nostril. If no response after 2-3 minutes, give another dose in the other nostril using a new spray. (Patient not taking: Reported on 4/4/2024), Disp: 1 each, Rfl: 1    obinutuzumab (GAZYVA) 1000 mg/40 mL SOLN, Infuse into a venous catheter, Disp: , Rfl:     posaconazole (NOXAFIL) 40 mg/mL suspension, Take by mouth daily, Disp: , Rfl:     predniSONE 5 mg tablet, Take 1 tablet (5 mg total) by mouth daily, Disp: 90 tablet, Rfl: 0    Ventolin  (90 Base) MCG/ACT inhaler, USE 2 INHALATIONS EVERY 6 HOURS AS NEEDED FOR WHEEZING, Disp: 54 g, Rfl: 5    Vonoprazan Fumarate 20 MG TABS, Take 20 mg by mouth in the morning, Disp: 30 tablet, Rfl: 5      Physical Exam:  There were no vitals taken for this visit.    Physical Exam      Labs:  Lab Results   Component Value Date    WBC 5.57 08/06/2024    HGB 14.2 08/06/2024     HCT 44.6 08/06/2024    MCV 94 08/06/2024     08/06/2024         I have spent *** minutes with {Patient /Family:[id number]} today in which greater than 50% of this time was spent in counseling/coordination of care regarding {AMB Counseling Topics:[id number]}.       Patient voiced understanding and agreement in the above discussion. Aware to contact our office with questions/symptoms in the interim.     This note has been generated by voice recognition software system.  Therefore, there may be spelling, grammar, and or syntax errors. Please contact if questions arise.

## 2024-08-14 NOTE — PROGRESS NOTES
HPI: Hematological continuation of care for CLL.    Patient has a longstanding history of CLL, diagnosed more than 20 years ago with complications along the way and presently patient's condition is stable  on 140 mg ibrutinib daily, Gazyva once a month and IVIG infusion once a month,  for stage IV CLL.  CLL condition has remained stable on this program for a long time.  Also he is on 5 mg prednisone daily and folic acid.  He is on acyclovir.  He is taking calcium and vitamin D.  He did not go for bone density test.  Will request again.  CLL was diagnosed more than 20 years ago and he had multiple lines of therapies and at 1 time his hemoglobin was down to 4.9 because of  autoimmune hemolytic anemia and CLL.    CLL was diagnosed several years ago. He had been through Fludara based chemotherapy and pentostatin based chemotherapy. He had increased hemolysis when he was on chemotherapy. His most recent chemotherapy treatment was Cytoxan, Oncovin, prednisone and Rituxan and last treatment was in December 2012.. In 2013 he was started on Rituxan, prednisone and folic acid because he started to hemolyse again. IVIG was tried unsuccessfully so far as hemolysis is concerned. Rituxan has controlled the hemolysis. ...  Information on the treatments from March 2017 onwards.  On 3/20/17 patient found to have hemoglobin of 6.2, ,000. He was admitted to [facility] for blood transfusion and plan to transfer to [facility].   On 3/20/17 WBC count 195,000, hemoglobin 4.9, platelet count 65. Direct coomb's was positive with undetectable haptoglobin. He was given 2 units of PRBCs, Solu-Medrol 1 g ×3 days and IVIG 1 g/kg daily ×3 days. His hemoglobin continued to drop. Bone marrow biopsy on 3/21 showed marrow cellularity of greater than 95% almost replaced by small lymphocytes, lambda light chain restricted, CD20 positive, CD19 positive, CD23 positive partially expressing CD11c and CD25  and CD5 positive and negative for CD 79 and CD38.  He was treated with single dose of chlorambucil to control his CLL.  3/22 second dose of chlorambucil, also Rituxan 375 mg/M ² autoimmune hemolytic anemia. WBC continued to rise, 266,000.  Patient was initiated with Venetoclax 20 mg daily. Tumor lysis monitored every 8 hours; given aggressive hydration and Lasix and remained euvolemic.  Later venetoclax was changed to ibrutinib because of disease progression  Dec 2017- Imbruvica decreased to 140 mg PO daily due to thrombocytopenia .  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 He was discharged on IV ampicillin. Echo negative for vegetations.   He is doing very well on present treatment plan so far as CLL is concerned  Patient had cataract surgeries in December 2023   He has tiredness, chronic nonproductive cough, exertional dyspnea, occasionally wheezing, arthritic symptoms and for that he is on oxycodone.  He has  vascular purpura on forearms.  He has chronic lung disease.  History of diabetes mellitus, hypertension and coronary artery disease.  .  For dysphagia he had EGD and dilatation in September 2020 and he follows with his gastroenterologist.   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    He has coronary artery disease and has 1 stent and he takes 81 mg aspirin daily with food.     . . 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  Patient is on prednisone 5 mg daily.                  Current Outpatient Medications:   •  acyclovir (ZOVIRAX) 400 MG tablet, TAKE 1 TABLET TWICE A DAY, Disp: 60 tablet, Rfl: 11  •  albuterol (2.5 mg/3 mL) 0.083 % nebulizer solution, Take 3 mL (2.5 mg total) by nebulization every 6 (six) hours as needed for wheezing or shortness of breath, Disp: 180 mL, Rfl: 5  •  amLODIPine (NORVASC) 10 mg tablet, TAKE 1 TABLET DAILY, Disp:  90 tablet, Rfl: 5  •  aspirin 81 MG tablet, Take 81 mg by mouth daily Every other day, Disp: , Rfl:   •  Blood Glucose Monitoring Suppl (FreeStyle Freedom Lite) w/Device KIT, Use 3 (three) times a day, Disp: 1 kit, Rfl: 0  •  citalopram (CeleXA) 40 mg tablet, TAKE 1 TABLET DAILY, Disp: 90 tablet, Rfl: 1  •  Continuous Blood Gluc  (FreeStyle Ashley 2 Ketchum) JOE, Use to scan sensor premeals and at bedtime, Disp: 1 each, Rfl: 1  •  Diclofenac Sodium (VOLTAREN) 1 %, Apply 4 g topically 4 (four) times a day, Disp: 100 g, Rfl: 0  •  fenofibrate (TRICOR) 145 mg tablet, TAKE 1 TABLET DAILY, Disp: 90 tablet, Rfl: 0  •  folic acid (FOLVITE) 1 mg tablet, Take 800 mcg by mouth daily , Disp: , Rfl:   •  gabapentin (NEURONTIN) 600 MG tablet, TAKE 1 TABLET DAILY, Disp: 90 tablet, Rfl: 5  •  glucose blood (FREESTYLE LITE) test strip, Check blood sugar 3 times a day, Disp: 300 strip, Rfl: 1  •  Ibrutinib 140 MG TABS, Take 140 mg by mouth daily, Disp: 28 tablet, Rfl: 5  •  Injection Device for Insulin (CeQur Simplicity 2U) JOE, Use 1 patch every 3 days, disp 1 box of 10 patches for 30 days supply, Disp: 1 each, Rfl: 3  •  Injection Device for Insulin (CeQur Simplicity ) MISC, Use to insert simplicity device., Disp: 1 each, Rfl: 1  •  insulin degludec (Tresiba FlexTouch) 100 units/mL injection pen, Inject 18 Units under the skin daily at bedtime, Disp: 15 mL, Rfl: 3  •  insulin lispro (Admelog SoloStar) 100 units/mL injection pen, 12-14 units before meals, Disp: 45 mL, Rfl: 1  •  Insulin Pen Needle (BD Pen Needle Inez U/F) 32G X 4 MM MISC, Use 3 (three) times a day, Disp: 300 each, Rfl: 0  •  Lancets (FREESTYLE) lancets, Use as instructed--test 2 times a day  E 11.9, Disp: 100 each, Rfl: 0  •  Lancets (freestyle) lancets, TEST BLOOD SUGAR 3 TIMES A DAY, Disp: 300 each, Rfl: 1  •  LORazepam (ATIVAN) 0.5 mg tablet, Take 1 tablet (0.5 mg total) by mouth daily as needed for anxiety, Disp: 30 tablet, Rfl: 0  •  losartan  (COZAAR) 100 MG tablet, TAKE 1 TABLET DAILY, Disp: 90 tablet, Rfl: 1  •  mometasone-formoterol (Dulera) 200-5 MCG/ACT inhaler, Inhale 2 puffs 2 (two) times a day Rinse mouth after use., Disp: 13 g, Rfl: 1  •  multivitamin (THERAGRAN) TABS, Take 1 tablet by mouth daily, Disp: , Rfl:   •  obinutuzumab (GAZYVA) 1000 mg/40 mL SOLN, Infuse into a venous catheter, Disp: , Rfl:   •  posaconazole (NOXAFIL) 40 mg/mL suspension, Take by mouth daily, Disp: , Rfl:   •  predniSONE 5 mg tablet, Take 1 tablet (5 mg total) by mouth daily, Disp: 90 tablet, Rfl: 0  •  Ventolin  (90 Base) MCG/ACT inhaler, USE 2 INHALATIONS EVERY 6 HOURS AS NEEDED FOR WHEEZING, Disp: 54 g, Rfl: 5  •  Vonoprazan Fumarate 20 MG TABS, Take 20 mg by mouth in the morning, Disp: 30 tablet, Rfl: 5  •  benzonatate (TESSALON) 200 MG capsule, TAKE 1 CAPSULE THREE TIMES A DAY AS NEEDED FOR COUGH, Disp: 20 capsule, Rfl: 3  •  Continuous Glucose Sensor (FreeStyle Ashley 2 Sensor) MISC, Apply 1 sensor every 14 days. Use as directed with Freestyle Ashley 2 reader., Disp: 6 each, Rfl: 2  •  insulin lispro (Admelog) 100 units/mL injection, Use up to 65 units per day as directed via [employer] device., Disp: 20 mL, Rfl: 3  •  naloxone (NARCAN) 4 mg/0.1 mL nasal spray, Administer 1 spray into a nostril. If no response after 2-3 minutes, give another dose in the other nostril using a new spray., Disp: 1 each, Rfl: 1    Allergies   Allergen Reactions   • Heparin Other (See Comments)     Other reaction(s): Blood Disorders  clot   • Macrolides And Ketolides Other (See Comments)     Interacts with other meds he is taking   • Simvastatin Myalgia       Oncology History   CLL (chronic lymphocytic leukemia) (HCC)   8/22/2017 -  Chemotherapy    chlorambucil (LEUKERAN), 0.5 mg/kg, Oral, Every 14 days, 6 of 6 cycles  obinutuzumab (GAZYVA) day 1 IVPB, 100 mg, Intravenous, Once, 1 of 1 cycle  obinutuzumab (GAZYVA) day 2 titrated infusion, 900 mg, Intravenous, Once, 1 of 1  cycle  obinutuzumab (GAZYVA) subsequent titrated infusion, 1,000 mg, Intravenous, Once, 71 of 75 cycles  Administration: 1,000 mg (5/21/2019), 1,000 mg (6/18/2019), 1,000 mg (7/16/2019), 1,000 mg (8/13/2019), 1,000 mg (9/10/2019), 1,000 mg (10/8/2019), 1,000 mg (11/5/2019), 1,000 mg (12/3/2019), 1,000 mg (12/31/2019), 1,000 mg (1/28/2020), 1,000 mg (2/25/2020), 1,000 mg (3/24/2020), 1,000 mg (4/21/2020), 1,000 mg (5/19/2020), 1,000 mg (6/16/2020), 1,000 mg (7/14/2020), 1,000 mg (8/11/2020), 1,000 mg (9/9/2020), 1,000 mg (10/6/2020), 1,000 mg (11/3/2020), 1,000 mg (12/1/2020), 1,000 mg (1/26/2021), 1,000 mg (12/29/2020), 1,000 mg (2/23/2021), 1,000 mg (3/23/2021), 1,000 mg (4/20/2021), 1,000 mg (5/18/2021), 1,000 mg (6/15/2021), 1,000 mg (7/13/2021), 1,000 mg (8/10/2021), 1,000 mg (9/7/2021), 1,000 mg (10/5/2021), 1,000 mg (11/2/2021), 1,000 mg (11/30/2021), 1,000 mg (12/28/2021), 1,000 mg (1/25/2022), 1,000 mg (2/22/2022), 1,000 mg (3/22/2022), 1,000 mg (5/17/2022), 1,000 mg (6/14/2022), 1,000 mg (7/12/2022), 1,000 mg (8/9/2022), 1,000 mg (9/6/2022), 1,000 mg (10/4/2022), 1,000 mg (11/1/2022), 1,000 mg (11/29/2022), 1,000 mg (1/24/2023), 1,000 mg (2/21/2023), 1,000 mg (3/21/2023), 1,000 mg (4/18/2023), 1,000 mg (5/16/2023), 1,000 mg (6/13/2023), 1,000 mg (7/11/2023), 1,000 mg (8/8/2023), 1,000 mg (9/5/2023), 1,000 mg (10/3/2023), 1,000 mg (10/31/2023), 1,000 mg (11/28/2023), 1,000 mg (1/23/2024), 1,000 mg (2/20/2024), 1,000 mg (3/19/2024), 1,000 mg (4/16/2024), 1,000 mg (5/14/2024), 1,000 mg (7/9/2024), 1,000 mg (8/6/2024)     4/24/2018 Initial Diagnosis    CLL (chronic lymphocytic leukemia) ([facility])         ROS:  08/16/24 Reviewed 13 systems: See symptoms in HPI  Presently no other neurological, cardiac, pulmonary, GI and  symptoms other than listed in HPI.  Other symptoms are in HPI.  No symptoms like fever, chills, bleeding, bone pains, skin rash, weight loss,  numbness,  claudication and gait problem. No frequent  "infections but had them before.  Not unusually sensitive to heat or cold. No swelling of the ankles. No swollen glands.  Patient is anxious.  Has vascular purpura on the arms.      /80 (BP Location: Left arm, Patient Position: Sitting, Cuff Size: Adult)   Pulse 86   Temp (!) 97.1 °F (36.2 °C) (Temporal)   Resp 16   Ht 6' 0.99\" (1.854 m)   Wt 98.9 kg (218 lb)   SpO2 97%   BMI 28.77 kg/m²     Physical Exam:  Patient is alert and oriented.  Patient is not in distress.  Vital signs are above.  There is no icterus.  There is no oral thrush.  There is no palpable neck mass.  Lung fields are clear to percussion and auscultation.  Heart rate is regular.  There is no palpable abdominal mass.  Abdomen is soft and nontender.  There is no ascites.  There is no edema of the ankles.  There is no calf tenderness.  There is no   focal neurological deficit.  No skin rash.  He has vascular purpura both forearms and back of hands.  No palpable lymphadenopathy in the neck and axillary areas.    No clubbing.  Patient is anxious.  Performance status 1.              .   Labs, Imaging, & Other studies:   CBC and differential  Status: Final result     CBC and differential  Order: [id number]   Status: Final result       Visible to patient: Yes (not seen)       Next appt: 08/20/2024 at 08:00 AM in Infusion Therapy (AL INF CHAIR 1)       Dx: CLL (chronic lymphocytic leukemia) (HCC)    0 Result Notes            Component  Ref Range & Units 8/6/24  8:26 AM 7/9/24  8:16 AM 6/8/24  4:32 AM 5/14/24  8:27 AM 4/16/24  8:37 AM 4/2/24  8:24 AM 3/26/24 10:29 AM   WBC  4.31 - 10.16 Thousand/uL 5.57 4.75 6.29 4.33 5.77 6.61 6.11   RBC  3.88 - 5.62 Million/uL 4.77 4.53 4.86 4.71 4.77 4.69 4.68   Hemoglobin  12.0 - 17.0 g/dL 14.2 13.6 14.7 14.3 14.4 14.6 14.4   Hematocrit  36.5 - 49.3 % 44.6 42.0 45.5 44.0 44.5 44.5 44.2   MCV  82 - 98 fL 94 93 94 93 93 95 94   MCH  26.8 - 34.3 pg 29.8 30.0 30.2 30.4 30.2 31.1 30.8   MCHC  31.4 - 37.4 g/dL 31.8 " 32.4 32.3 32.5 32.4 32.8 32.6   RDW  11.6 - 15.1 % 14.6 13.9 13.8 13.7 13.5 13.7 13.7   MPV  8.9 - 12.7 fL 11.4 11.9 10.9 11.7 11.5 10.5 11.0   Platelets  149 - 390 Thousands/uL 171 145 Low  148 Low  154 169 162 117 Low    nRBC    0 R, CM      Absolute Lymphocytes    0.71 R      Absolute Monocytes    0.55 R      Eosinophils Absolute    0.40 R      Basophils Absolute    0.06 R      Segmented %    55 R      Immature Grans %    6 High  R      Lymphocytes %    16 R      Monocytes %    13 High  R      Eosinophils Relative    9 High  R      Basophils Relative    1 R      Absolute Neutrophils    2.35 R      Absolute Immature Grans    0.26 High  R                 Specimen Collected: 08/06/24  8:26 AM Last Resulted: 08/06/24  3:11 PM        Lab Flowsheet        Order Details        View Encounter        Lab and Collection Details        Routing        Result History     View All Conversations on this Encounter        CM=Additional comments  R=Reference range differs from displayed range        Related Results     Retic Count Final result 8/6/2024                       Result Care Coordination      Patient Communication     Add Comments   Add Notifications  Back to Top          Contains abnormal data Manual Differential(PHLEBS Do Not Order)  Order: [id number] - Reflex for Order [id number]   Status: Final result         Visible to patient: Yes (not seen)         Next appt: 08/20/2024 at 08:00 AM in Infusion Therapy (AL INF CHAIR 1)         Dx: CLL (chronic lymphocytic leukemia) ([facility])      0 Result Notes                Component  Ref Range & Units 8/6/24  8:26 AM 7/9/24  8:16 AM 5/14/24  8:27 AM 4/16/24  8:37 AM 4/2/24  8:24 AM 3/26/24 10:29 AM 3/19/24  8:17 AM   Segmented %  43 - 75 % 55 57 55 76 High  59 68 58   Bands %  0 - 8 % 1   1 5     Lymphocytes %  14 - 44 % 15 22 16 10 Low  14 18 12 Low    Monocytes %  4 - 12 % 13 High  18 High  13 High  10 12 8 14 High    Eosinophils %  0 - 6 % 12 High  3 9 High  2 9 High  6 14 High     Basophils %  0 - 1 % 0 0 1 0 0 0 2 High    Myelocytes %  0 - 1 % 1         Atypical Lymphocytes %  <=0 % 3 High     1 High      Absolute Neutrophils  1.85 - 7.62 Thousand/uL 3.12 2.71 2.35 R 4.44 4.23 4.15 2.82   Absolute Lymphocytes  0.60 - 4.47 Thousand/uL 1.00 1.05 0.71 R 0.58 Low  0.99 1.10 0.58 Low    Absolute Monocytes  0.00 - 1.22 Thousand/uL 0.72 0.86 0.55 R 0.58 0.79 0.49 0.68   Absolute Eosinophils  0.00 - 0.40 Thousand/uL 0.67 High  0.14 0.40 R 0.12 0.59 High  0.37 0.68 High    Absolute Basophils  0.00 - 0.10 Thousand/uL 0.00 0.00 0.06 R 0.00 0.00 0.00 0.10   Absolute Myelocytes  0.00 - 0.10 Thousand/uL 0.06         Total Counted          RBC Morphology Normal Normal  Normal Normal Normal Normal   Platelet Estimate  Adequate Adequate Borderline Abnormal   Adequate Adequate Borderline Abnormal  Adequate   Large Platelet Present                    Specimen Collected: 08/06/24  8:26 AM Last Resulted: 08/06/24  8:49 PM                         Component  Ref Range & Units 8/6/24  8:26 AM 7/9/24  8:16 AM 5/14/24  8:27 AM 4/16/24  8:37 AM 4/2/24  8:24 AM 3/19/24  8:17 AM 2/20/24  8:45 AM   Retic Ct Abs  14,356 - 105,094 84,900 98,800 104,600 93,500 82,500 112,600 High  97,600   Retic Ct Pct  0.37 - 1.87 % 1.78 2.18 High  2.22 High  1.96 High  1.76 2.35 High  2.03 High                              Component  Ref Range & Units 8/6/24  8:26 AM 7/9/24  8:16 AM 5/14/24  8:27 AM 4/16/24  8:37 AM 3/19/24  8:17 AM 2/20/24  8:45 AM 1/23/24  8:29 AM   IGG  635 - 1,741 mg/dL 543 Low  509 Low  519 Low  505 Low  508 Low  523 Low  529 Low                    Comprehensive metabolic panel  Status: Final result      Contains abnormal data Comprehensive metabolic panel  Order: [id number]   Status: Final result       Visible to patient: Yes (not seen)       Next appt: 08/20/2024 at 08:00 AM in Infusion Therapy (AL INF CHAIR 1)       Dx: CLL (chronic lymphocytic leukemia) (HCC)    0 Result Notes       1 HM Topic             Component  Ref Range & Units 8/6/24  8:26 AM 7/9/24  8:16 AM 6/8/24  4:32 AM 5/14/24  8:27 AM 4/16/24  8:37 AM 4/16/24  8:37 AM 3/19/24  8:17 AM   Sodium  135 - 147 mmol/L 139 140 138 140 140 139 139   Potassium  3.5 - 5.3 mmol/L 3.9 3.6 4.1 3.8 4.1 3.9 4.1   Chloride  96 - 108 mmol/L 107 106 103 107 107 106 107   CO2  21 - 32 mmol/L 25 27 26 28 28 28 27   ANION GAP  4 - 13 mmol/L 7 7 9 5 5 5 5   BUN  5 - 25 mg/dL 18 18 17 16 20 20 17   Creatinine  0.60 - 1.30 mg/dL 1.01 0.99 CM 1.06 CM 1.04 CM 1.03 CM 1.03 CM 1.09 CM   Comment: Standardized to IDMS reference method   Glucose  65 - 140 mg/dL 132 90  High   High     High  CM   Comment: If the patient is fasting, the ADA then defines impaired fasting glucose as > 100 mg/dL and diabetes as > or equal to 123 mg/dL.   Calcium  8.4 - 10.2 mg/dL 9.0 8.9 9.0 8.9 9.5 9.5 9.1   AST  13 - 39 U/L 33 35  37 37 37 40 High    ALT  7 - 52 U/L 39 44 CM  52 CM 44 CM 45 CM 53 High  CM   Comment: Specimen collection should occur prior to Sulfasalazine administration due to the potential for falsely depressed results.   Alkaline Phosphatase  34 - 104 U/L 44 42  44 38 37 45   Total Protein  6.4 - 8.4 g/dL 6.3 Low  6.0 Low   6.1 Low  6.3 Low  6.3 Low  6.3 Low    Albumin  3.5 - 5.0 g/dL 4.0 4.1  4.1 4.2 4.3 4.2   Total Bilirubin  0.20 - 1.00 mg/dL 0.50 0.56 CM  0.50 CM 0.62 CM 0.60 CM 0.55 CM   Comment: Use of this assay is not recommended for patients undergoing treatment with eltrombopag due to the potential for falsely elevated results.  N-acetyl-p-benzoquinone imine (metabolite of Acetaminophen) will generate erroneously low results in samples for patients that have taken an overdose of Acetaminophen.   eGFR  ml/min/1.73sq m 75 77 71 72 73 73 68              Narrative            All pertinent labs and imaging studies were personally reviewed      Reviewed test results and discussed with patient.    Assessment and plan:     HPI: Hematological continuation  of care for CLL.    Patient has a longstanding history of CLL, diagnosed more than 20 years ago with complications along the way and presently patient's condition is stable  on 140 mg ibrutinib daily, Gazyva once a month and IVIG infusion once a month,  for stage IV CLL.  CLL condition has remained stable on this program for a long time.  Also he is on 5 mg prednisone daily and folic acid.  He is on acyclovir.  He is taking calcium and vitamin D.  He did not go for bone density test.  Will request again.  CLL was diagnosed more than 20 years ago and he had multiple lines of therapies and at 1 time his hemoglobin was down to 4.9 because of  autoimmune hemolytic anemia and CLL.    CLL was diagnosed several years ago. He had been through Fludara based chemotherapy and pentostatin based chemotherapy. He had increased hemolysis when he was on chemotherapy. His most recent chemotherapy treatment was Cytoxan, Oncovin, prednisone and Rituxan and last treatment was in December 2012.. In 2013 he was started on Rituxan, prednisone and folic acid because he started to hemolyse again. IVIG was tried unsuccessfully so far as hemolysis is concerned. Rituxan has controlled the hemolysis. ...  Information on the treatments from March 2017 onwards.  On 3/20/17 patient found to have hemoglobin of 6.2, ,000. He was admitted to [facility] for blood transfusion and plan to transfer to [facility].   On 3/20/17 WBC count 195,000, hemoglobin 4.9, platelet count 65. Direct coomb's was positive with undetectable haptoglobin. He was given 2 units of PRBCs, Solu-Medrol 1 g ×3 days and IVIG 1 g/kg daily ×3 days. His hemoglobin continued to drop. Bone marrow biopsy on 3/21 showed marrow cellularity of greater than 95% almost replaced by small lymphocytes, lambda light chain restricted, CD20 positive, CD19 positive, CD23 positive partially expressing CD11c and CD25 and CD5 positive and negative for  CD 79 and CD38.  He was treated with single dose of chlorambucil to control his CLL.  3/22 second dose of chlorambucil, also Rituxan 375 mg/M ² autoimmune hemolytic anemia. WBC continued to rise, 266,000.  Patient was initiated with Venetoclax 20 mg daily. Tumor lysis monitored every 8 hours; given aggressive hydration and Lasix and remained euvolemic.  Later venetoclax was changed to ibrutinib because of disease progression  Dec 2017- Imbruvica decreased to 140 mg PO daily due to thrombocytopenia .  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 He was discharged on IV ampicillin. Echo negative for vegetations.   He is doing very well on present treatment plan so far as CLL is concerned  Patient had cataract surgeries in December 2023   He has tiredness, chronic nonproductive cough, exertional dyspnea, occasionally wheezing, arthritic symptoms and for that he is on oxycodone.  He has  vascular purpura on forearms.  He has chronic lung disease.  History of diabetes mellitus, hypertension and coronary artery disease.  .  For dysphagia he had EGD and dilatation in September 2020 and he follows with his gastroenterologist.   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    He has coronary artery disease and has 1 stent and he takes 81 mg aspirin daily with food.     . . 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  Patient is on prednisone 5 mg daily.     Physical examination and test results are as recorded and discussed in  detail.    Hematologically stable.  No change in therapy.  All discussed in detail.  Questions answered.    Discussed the importance of eating healthy foods, staying active and health screening tests.  Patient to avoid trauma and falls. Goal is prolongation of survival and prevention of complications.  Patient is capable of self-care.  Discussed  precautions against coronavirus and other infections.  Patient is immunocompromised .   .  See diagnoses, orders and instructions    1. CLL (chronic lymphocytic leukemia) ([facility])      2. Hypogammaglobulinemia, acquired ([facility])      3. Low serum IgG for age      4. Cancer related pain      5. Autoimmune hemolytic anemia ([facility])      6. 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[facility])      7. Thrombocytopenia ([facility])      8. Chronic obstructive pulmonary disease, unspecified COPD type ([facility])      9. Type 2 diabetes mellitus with diabetic polyneuropathy, with long-term current use of insulin ([facility])      10. Coronary artery disease involving native coronary artery of native heart without angina pectoris      11. Primary hypertension      12. Stage 3 chronic kidney disease, unspecified whether stage 3a or 3b CKD ([facility])    13. Drug-induced osteoporosis    - DXA bone density spine hip and pelvis; Future      No change in therapy.  Patient has standing orders for blood work.  Follow-up in 3 months.  Ordered bone density DEXA scan in a month.                         Patient will continue to follow with his primary physician and other consultants      I used dictation device to dictate this note and there could be mistakes in my note and for that he may call my office      Patient voiced understanding and agreed      Counseling / Coordination of Care  .  Provided counseling and support

## 2024-08-14 NOTE — PATIENT INSTRUCTIONS
No change in therapy.  Patient has standing orders for blood work.  Follow-up in 3 months.  Ordered bone density DEXA scan in a month.

## 2024-08-15 ENCOUNTER — OFFICE VISIT (OUTPATIENT)
Dept: ENDOCRINOLOGY | Facility: CLINIC | Age: 69
End: 2024-08-15
Payer: MEDICARE

## 2024-08-15 VITALS
HEIGHT: 73 IN | BODY MASS INDEX: 29.16 KG/M2 | OXYGEN SATURATION: 99 % | DIASTOLIC BLOOD PRESSURE: 70 MMHG | SYSTOLIC BLOOD PRESSURE: 140 MMHG | WEIGHT: 220 LBS | HEART RATE: 70 BPM

## 2024-08-15 DIAGNOSIS — E09.9 STEROID-INDUCED DIABETES MELLITUS, SEQUELA (HCC): ICD-10-CM

## 2024-08-15 DIAGNOSIS — Z79.4 TYPE 2 DIABETES MELLITUS WITH HYPOGLYCEMIA WITHOUT COMA, WITH LONG-TERM CURRENT USE OF INSULIN (HCC): ICD-10-CM

## 2024-08-15 DIAGNOSIS — E78.2 MIXED HYPERLIPIDEMIA: ICD-10-CM

## 2024-08-15 DIAGNOSIS — T38.0X5S STEROID-INDUCED DIABETES MELLITUS, SEQUELA (HCC): ICD-10-CM

## 2024-08-15 DIAGNOSIS — E11.649 TYPE 2 DIABETES MELLITUS WITH HYPOGLYCEMIA WITHOUT COMA, WITH LONG-TERM CURRENT USE OF INSULIN (HCC): ICD-10-CM

## 2024-08-15 DIAGNOSIS — I10 PRIMARY HYPERTENSION: ICD-10-CM

## 2024-08-15 DIAGNOSIS — E11.65 TYPE 2 DIABETES MELLITUS WITH HYPERGLYCEMIA, WITH LONG-TERM CURRENT USE OF INSULIN (HCC): Primary | ICD-10-CM

## 2024-08-15 DIAGNOSIS — E11.9 TYPE 2 DIABETES MELLITUS WITHOUT COMPLICATION, WITH LONG-TERM CURRENT USE OF INSULIN (HCC): ICD-10-CM

## 2024-08-15 DIAGNOSIS — Z79.4 TYPE 2 DIABETES MELLITUS WITH HYPERGLYCEMIA, WITH LONG-TERM CURRENT USE OF INSULIN (HCC): Primary | ICD-10-CM

## 2024-08-15 DIAGNOSIS — Z79.4 TYPE 2 DIABETES MELLITUS WITHOUT COMPLICATION, WITH LONG-TERM CURRENT USE OF INSULIN (HCC): ICD-10-CM

## 2024-08-15 LAB — SL AMB POCT HEMOGLOBIN AIC: 6.4 (ref ?–6.5)

## 2024-08-15 PROCEDURE — 83036 HEMOGLOBIN GLYCOSYLATED A1C: CPT | Performed by: INTERNAL MEDICINE

## 2024-08-15 PROCEDURE — 95251 CONT GLUC MNTR ANALYSIS I&R: CPT | Performed by: INTERNAL MEDICINE

## 2024-08-15 PROCEDURE — 99214 OFFICE O/P EST MOD 30 MIN: CPT | Performed by: INTERNAL MEDICINE

## 2024-08-15 NOTE — PROGRESS NOTES
Cayetano Lucero 68 y.o. male MRN: [id number]    Encounter: [id number]      Assessment & Plan     Problem List Items Addressed This Visit          Cardiovascular and Mediastinum    Hypertension     Blood pressure almost at goal-continue current regimen            Endocrine    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HCC)    Type 2 diabetes mellitus with hyperglycemia, with long-term current use of insulin (HCC) - Primary     Well-controlled-continue current regimen  Lab Results   Component Value Date    HGBA1C 6.4 08/15/2024   , Remember to bolus before meals         Relevant Orders    POCT hemoglobin A1c (Completed)    Hemoglobin A1C    Albumin / creatinine urine ratio    Comprehensive metabolic panel       Other    Hyperlipidemia    Relevant Orders    Lipid Panel with Direct LDL reflex     Other Visit Diagnoses       Type 2 diabetes mellitus with hypoglycemia without coma, with long-term current use of insulin (HCC)                 CC: Diabetes    History of Present Illness     HPI:  68-year-old male with type 2 diabetes on insulin therapy, hypertension and hyperlipidemia seen in follow-up   Current regimen:   Tresiba 18 daily at bedtime  Admelog 12-14 before meals  Will take 18 on morning of chemo infusion. Once every other week       Cayetano Luecro   Device used Freestyle merary  Home use       Indication     Type 2 Diabetes    More than 72 hours of data was reviewed. Report to be scanned to chart.     Date Range:     Analysis of data:  August 2nd- 15th 2024   Average Glucose:  156 mg/dl  Coefficient of Variation: 33.9%  Time in Target Range:  67%  Time Above Range: 31%  Time Below Range: 2%    Interpretation of data: Overall fairly well-controlled with some post meal hyperglycemia -likely related to late boluses , bolusing after meals    Used cequer and not using now due to abdominal surgery     No polyuria , polydipsia , c/o blurry vision . No numbness and tingling in feet         Review of Systems    Historical Information    Past Medical History:   Diagnosis Date    Allergic     Anemia     Arthritis     CAD (coronary artery disease) 2004    Cancer (HCC)     Leukemia    Cellulitis of scalp     CKD (chronic kidney disease) stage 3, GFR 30-59 ml/min (HCC)     CLL (chronic lymphocytic leukemia) (HCC)     COPD (chronic obstructive pulmonary disease) (HCC)     Diabetes mellitus (HCC)     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    Dyslipidemia     Edema extremities     Esophageal ulcer     H/O blood clots     Hemolytic anemia (HCC)     Hiatal hernia     History of TIA (transient ischemic attack)     Hyperlipidemia     Hypertension     Listeria infection 2018    Multiple gastric ulcers     Myocardial infarction (HCC) 2004    acute    Neutropenia (HCC)     Obese     Recurrent sinusitis     Thrombocytopenia (HCC)     Vertigo      Past Surgical History:   Procedure Laterality Date    ARTHROSCOPIC REPAIR ACL      lt knee    CARDIAC SURGERY      cardiac cath with stent    FOOT SURGERY      IR PORT CHECK  5/17/2019    KNEE ARTHROSCOPY      OTHER SURGICAL HISTORY      cardiac cath lesion 1, 1st adjunct treat device: stent    PORTACATH PLACEMENT      VT ESOPHAGOGASTRODUODENOSCOPY TRANSORAL DIAGNOSTIC N/A 10/5/2017    Procedure: ESOPHAGOGASTRODUODENOSCOPY (EGD) with bx;  Surgeon: Winifred Hansen DO;  Location: [facility];  Service: Gastroenterology    VT ESOPHAGOGASTRODUODENOSCOPY TRANSORAL DIAGNOSTIC N/A 3/8/2018    Procedure: ESOPHAGOGASTRODUODENOSCOPY (EGD) with biopsy;  Surgeon: Winifred Hansen DO;  Location: [facility];  Service: Gastroenterology    VT ESOPHAGOGASTRODUODENOSCOPY TRANSORAL DIAGNOSTIC N/A 6/20/2018    Procedure: ESOPHAGOGASTRODUODENOSCOPY (EGD) with Dilation;  Surgeon: Winifred Hansen DO;  Location: [facility];  Service: Gastroenterology    VT ESOPHAGOGASTRODUODENOSCOPY TRANSORAL DIAGNOSTIC N/A 10/18/2018    Procedure: ESOPHAGOGASTRODUODENOSCOPY (EGD) with dilation;  Surgeon: Edu Mejía MD;  Location: [facility];  Service: Gastroenterology    VT  ESOPHAGOGASTRODUODENOSCOPY TRANSORAL DIAGNOSTIC N/A 2024    Procedure: ESOPHAGOGASTRODUODENOSCOPY (EGD);  Surgeon: Immanuel Gonsales MD;  Location: [facility];  Service: Thoracic    DE ESOPHAGOSCOPY FLEXIBLE TRANSORAL WITH BIOPSY N/A 2016    Procedure: ESOPHAGOGASTRODUODENOSCOPY (EGD); ESOPHAGEAL DILATION; ESOPHAGEAL BIOPSY;  Surgeon: Abdi Holcomb MD;  Location: [facility];  Service: Thoracic    DE LAPS RPR PARAESPHGL HRNA INCL FUNDPLSTY W/O MESH N/A 2024    Procedure: REPAIR HERNIA PARAESOPHAGEAL LAPAROSCOPIC W ROBOTICS TYPE 3. PARTIAL FUNDOPLICATION. MESH.;  Surgeon: Immanuel Gonsales MD;  Location: [facility];  Service: Thoracic    TONSILLECTOMY      UPPER GASTROINTESTINAL ENDOSCOPY      x 6     Social History   Social History     Substance and Sexual Activity   Alcohol Use Yes    Alcohol/week: 2.0 standard drinks of alcohol    Types: 2 Cans of beer per week    Comment: social use as per [employer]     Social History     Substance and Sexual Activity   Drug Use Never     Social History     Tobacco Use   Smoking Status Former    Current packs/day: 0.00    Average packs/day: 2.0 packs/day for 33.0 years (66.0 ttl pk-yrs)    Types: Cigarettes    Start date:     Quit date:     Years since quittin.6    Passive exposure: Past   Smokeless Tobacco Never     Family History:   Family History   Problem Relation Age of Onset    Leukemia Mother         chronic    Colon polyps Mother         sidmoid    Parkinsonism Father        Meds/Allergies   Current Outpatient Medications   Medication Sig Dispense Refill    acyclovir (ZOVIRAX) 400 MG tablet TAKE 1 TABLET TWICE A DAY 60 tablet 11    albuterol (2.5 mg/3 mL) 0.083 % nebulizer solution Take 3 mL (2.5 mg total) by nebulization every 6 (six) hours as needed for wheezing or shortness of breath 180 mL 5    amLODIPine (NORVASC) 10 mg tablet TAKE 1 TABLET DAILY 90 tablet 5    aspirin 81 MG tablet Take 81 mg by mouth daily Every other day       benzonatate (TESSALON) 200 MG capsule TAKE 1 CAPSULE THREE TIMES A DAY AS NEEDED FOR COUGH 20 capsule 3    Blood Glucose Monitoring Suppl (FreeStyle Freedom Lite) w/Device KIT Use 3 (three) times a day 1 kit 0    citalopram (CeleXA) 40 mg tablet TAKE 1 TABLET DAILY 90 tablet 1    Continuous Blood Gluc  (FreeStyle Ashley 2 [place]) JOE Use to scan sensor premeals and at bedtime 1 each 1    Diclofenac Sodium (VOLTAREN) 1 % Apply 4 g topically 4 (four) times a day 100 g 0    fenofibrate (TRICOR) 145 mg tablet TAKE 1 TABLET DAILY 90 tablet 0    folic acid (FOLVITE) 1 mg tablet Take 800 mcg by mouth daily       gabapentin (NEURONTIN) 600 MG tablet TAKE 1 TABLET DAILY 90 tablet 5    glucose blood (FREESTYLE LITE) test strip Check blood sugar 3 times a day 300 strip 1    Ibrutinib 140 MG TABS Take 140 mg by mouth daily 28 tablet 5    Injection Device for Insulin (CeQur Simplicity 2U) JOE Use 1 patch every 3 days, disp 1 box of 10 patches for 30 days supply 1 each 3    Injection Device for Insulin (CeQur Simplicity ) MISC Use to insert simplicity device. 1 each 1    insulin degludec (Tresiba FlexTouch) 100 units/mL injection pen Inject 18 Units under the skin daily at bedtime 15 mL 3    insulin lispro (Admelog SoloStar) 100 units/mL injection pen 12-14 units before meals 45 mL 1    insulin lispro (Admelog) 100 units/mL injection Use up to 65 units per day as directed via cecur simplicity device. 20 mL 3    Insulin Pen Needle (BD Pen Needle Inez U/F) 32G X 4 MM MISC Use 3 (three) times a day 300 each 0    Lancets (FREESTYLE) lancets Use as instructed--test 2 times a day    E 11.9 100 each 0    Lancets (freestyle) lancets TEST BLOOD SUGAR 3 TIMES A  each 1    LORazepam (ATIVAN) 0.5 mg tablet Take 1 tablet (0.5 mg total) by mouth daily as needed for anxiety 30 tablet 0    losartan (COZAAR) 100 MG tablet TAKE 1 TABLET DAILY 90 tablet 1    mometasone-formoterol (Dulera) 200-5 MCG/ACT inhaler Inhale 2 puffs  "2 (two) times a day Rinse mouth after use. 13 g 1    multivitamin (THERAGRAN) TABS Take 1 tablet by mouth daily      naloxone (NARCAN) 4 mg/0.1 mL nasal spray Administer 1 spray into a nostril. If no response after 2-3 minutes, give another dose in the other nostril using a new spray. 1 each 1    obinutuzumab (GAZYVA) 1000 mg/40 mL SOLN Infuse into a venous catheter      posaconazole (NOXAFIL) 40 mg/mL suspension Take by mouth daily      predniSONE 5 mg tablet Take 1 tablet (5 mg total) by mouth daily 90 tablet 0    Ventolin  (90 Base) MCG/ACT inhaler USE 2 INHALATIONS EVERY 6 HOURS AS NEEDED FOR WHEEZING 54 g 5    Vonoprazan Fumarate 20 MG TABS Take 20 mg by mouth in the morning 30 tablet 5    Continuous Glucose Sensor (FreeStyle Ashley 2 Sensor) MISC Apply 1 sensor every 14 days. Use as directed with Freestyle Ashley 2 reader. 6 each 2     No current facility-administered medications for this visit.     Allergies   Allergen Reactions    Heparin Other (See Comments)     Other reaction(s): Blood Disorders  clot    Macrolides And Ketolides Other (See Comments)     Interacts with other meds he is taking    Simvastatin Myalgia       Objective   Vitals: Blood pressure 140/70, pulse 70, height 6' 0.99\" (1.854 m), weight 99.8 kg (220 lb), SpO2 99%.    Physical Exam  Vitals reviewed.   Constitutional:       General: He is not in acute distress.     Appearance: Normal appearance. He is obese. He is not ill-appearing, toxic-appearing or diaphoretic.   HENT:      Head: Normocephalic and atraumatic.   Eyes:      General: No scleral icterus.     Extraocular Movements: Extraocular movements intact.   Cardiovascular:      Rate and Rhythm: Normal rate and regular rhythm.      Heart sounds: Normal heart sounds. No murmur heard.  Pulmonary:      Effort: Pulmonary effort is normal. No respiratory distress.      Breath sounds: Normal breath sounds. No wheezing or rales.   Musculoskeletal:      Cervical back: Neck supple.      " Right lower leg: No edema.      Left lower leg: No edema.   Lymphadenopathy:      Cervical: No cervical adenopathy.   Skin:     General: Skin is warm and dry.   Neurological:      General: No focal deficit present.      Mental Status: He is alert and oriented to person, place, and time.      Gait: Gait normal.   Psychiatric:         Mood and Affect: Mood normal.         Behavior: Behavior normal.         Thought Content: Thought content normal.         Judgment: Judgment normal.         The history was obtained from the review of the chart, patient.    Lab Results:   Lab Results   Component Value Date/Time    Hemoglobin A1C 6.4 08/15/2024 08:53 AM    Hemoglobin A1C 6.8 (H) 04/16/2024 08:37 AM    Hemoglobin A1C 6.5 01/04/2024 09:38 AM    Hemoglobin A1C 6.7 (A) 08/23/2023 08:26 AM    WBC 5.57 08/06/2024 08:26 AM    WBC 4.75 07/09/2024 08:16 AM    WBC 6.29 06/08/2024 04:32 AM    Hemoglobin 14.2 08/06/2024 08:26 AM    Hemoglobin 13.6 07/09/2024 08:16 AM    Hemoglobin 14.7 06/08/2024 04:32 AM    Hematocrit 44.6 08/06/2024 08:26 AM    Hematocrit 42.0 07/09/2024 08:16 AM    Hematocrit 45.5 06/08/2024 04:32 AM    MCV 94 08/06/2024 08:26 AM    MCV 93 07/09/2024 08:16 AM    MCV 94 06/08/2024 04:32 AM    Platelets 171 08/06/2024 08:26 AM    Platelets 145 (L) 07/09/2024 08:16 AM    Platelets 148 (L) 06/08/2024 04:32 AM    BUN 18 08/06/2024 08:26 AM    BUN 18 07/09/2024 08:16 AM    BUN 17 06/08/2024 04:32 AM    Potassium 3.9 08/06/2024 08:26 AM    Potassium 3.6 07/09/2024 08:16 AM    Potassium 4.1 06/08/2024 04:32 AM    Chloride 107 08/06/2024 08:26 AM    Chloride 106 07/09/2024 08:16 AM    Chloride 103 06/08/2024 04:32 AM    CO2 25 08/06/2024 08:26 AM    CO2 27 07/09/2024 08:16 AM    CO2 26 06/08/2024 04:32 AM    Creatinine 1.01 08/06/2024 08:26 AM    Creatinine 0.99 07/09/2024 08:16 AM    Creatinine 1.06 06/08/2024 04:32 AM    AST 33 08/06/2024 08:26 AM    AST 35 07/09/2024 08:16 AM    AST 37 05/14/2024 08:27 AM    ALT 39  "08/06/2024 08:26 AM    ALT 44 07/09/2024 08:16 AM    ALT 52 05/14/2024 08:27 AM    Total Protein 6.3 (L) 08/06/2024 08:26 AM    Total Protein 6.0 (L) 07/09/2024 08:16 AM    Total Protein 6.1 (L) 05/14/2024 08:27 AM    Albumin 4.0 08/06/2024 08:26 AM    Albumin 4.1 07/09/2024 08:16 AM    Albumin 4.1 05/14/2024 08:27 AM    HDL, Direct 33 (L) 04/16/2024 08:37 AM    Triglycerides 94 04/16/2024 08:37 AM           Imaging Studies: I have personally reviewed pertinent reports.      Portions of the record may have been created with voice recognition software. Occasional wrong word or \"sound a like\" substitutions may have occurred due to the inherent limitations of voice recognition software. Read the chart carefully and recognize, using context, where substitutions have occurred.    "

## 2024-08-15 NOTE — TELEPHONE ENCOUNTER
Pt is completely out and meant to ask today at appt but forgot.    Reason for call:   [x] Refill   [] Prior Auth  [] Other:     Office:   [] PCP/Provider -   [x] Specialty/Provider - [facility] Hollandale Azael / Halie No MD     Medication:   Continuous Blood Gluc Sensor (FreeStyle Ashley 2 Sensor) MISC - Apply 1 sensor every 14 days. Use as directed with Freestyle Ashley 2 reader.     Pharmacy:   [id number] - [place] - [id number]     Does the patient have enough for 3 days?   [] Yes   [x] No - Send as HP to POD

## 2024-08-16 NOTE — ASSESSMENT & PLAN NOTE
Well-controlled-continue current regimen  Lab Results   Component Value Date    HGBA1C 6.4 08/15/2024   , Remember to bolus before meals

## 2024-08-19 DIAGNOSIS — C91.10 CHRONIC LYMPHATIC LEUKEMIA (HCC): ICD-10-CM

## 2024-08-19 NOTE — TELEPHONE ENCOUNTER
Reason for call:   [x] Refill   [] Prior Auth  [] Other:     Office:   [] PCP/Provider -   [x] Specialty/Provider - Hem/Onc    Medication: Ibrutinib 140 MG TABS     Dose/Frequency: Take 140 mg by mouth daily     Quantity: 28    Pharmacy: [facility] [place] - [address]     Does the patient have enough for 3 days?   [x] Yes   [] No - Send as HP to POD

## 2024-08-20 ENCOUNTER — HOSPITAL ENCOUNTER (OUTPATIENT)
Dept: INFUSION CENTER | Facility: CLINIC | Age: 69
Discharge: HOME | End: 2024-08-20
Payer: MEDICARE

## 2024-08-20 VITALS
SYSTOLIC BLOOD PRESSURE: 160 MMHG | DIASTOLIC BLOOD PRESSURE: 77 MMHG | RESPIRATION RATE: 18 BRPM | BODY MASS INDEX: 28.8 KG/M2 | TEMPERATURE: 97.6 F | WEIGHT: 218.26 LBS | HEART RATE: 70 BPM

## 2024-08-20 DIAGNOSIS — R76.8 LOW SERUM IGG FOR AGE: Primary | ICD-10-CM

## 2024-08-20 DIAGNOSIS — C91.10 CLL (CHRONIC LYMPHOCYTIC LEUKEMIA) (HCC): ICD-10-CM

## 2024-08-20 DIAGNOSIS — D80.1 HYPOGAMMAGLOBULINEMIA, ACQUIRED (HCC): ICD-10-CM

## 2024-08-20 PROCEDURE — 96366 THER/PROPH/DIAG IV INF ADDON: CPT

## 2024-08-20 PROCEDURE — 96367 TX/PROPH/DG ADDL SEQ IV INF: CPT

## 2024-08-20 PROCEDURE — 96365 THER/PROPH/DIAG IV INF INIT: CPT

## 2024-08-20 RX ORDER — SODIUM CHLORIDE 9 MG/ML
20 INJECTION, SOLUTION INTRAVENOUS ONCE
Status: COMPLETED | OUTPATIENT
Start: 2024-08-20 | End: 2024-08-20

## 2024-08-20 RX ORDER — ACETAMINOPHEN 325 MG/1
650 TABLET ORAL ONCE
Status: COMPLETED | OUTPATIENT
Start: 2024-08-20 | End: 2024-08-20

## 2024-08-20 RX ORDER — SODIUM CHLORIDE 9 MG/ML
20 INJECTION, SOLUTION INTRAVENOUS ONCE
OUTPATIENT
Start: 2024-09-17

## 2024-08-20 RX ORDER — ACETAMINOPHEN 325 MG/1
650 TABLET ORAL ONCE
OUTPATIENT
Start: 2024-09-17

## 2024-08-20 RX ADMIN — DEXAMETHASONE SODIUM PHOSPHATE 4 MG: 100 INJECTION INTRAMUSCULAR; INTRAVENOUS at 08:39

## 2024-08-20 RX ADMIN — Medication 20 G: at 09:11

## 2024-08-20 RX ADMIN — SODIUM CHLORIDE 20 ML/HR: 0.9 INJECTION, SOLUTION INTRAVENOUS at 08:16

## 2024-08-20 RX ADMIN — ACETAMINOPHEN 650 MG: 325 TABLET ORAL at 08:17

## 2024-08-20 RX ADMIN — DIPHENHYDRAMINE HYDROCHLORIDE 12.5 MG: 50 INJECTION, SOLUTION INTRAMUSCULAR; INTRAVENOUS at 08:17

## 2024-08-28 RX ORDER — ACETAMINOPHEN 325 MG/1
650 TABLET ORAL ONCE
OUTPATIENT
Start: 2024-09-03

## 2024-08-28 RX ORDER — SODIUM CHLORIDE 9 MG/ML
20 INJECTION, SOLUTION INTRAVENOUS ONCE
OUTPATIENT
Start: 2024-09-03

## 2024-08-30 DIAGNOSIS — J31.0 CHRONIC RHINITIS: ICD-10-CM

## 2024-08-30 DIAGNOSIS — F41.9 ANXIETY: ICD-10-CM

## 2024-08-30 RX ORDER — CITALOPRAM HYDROBROMIDE 40 MG/1
TABLET ORAL
Qty: 30 TABLET | Refills: 5 | Status: SHIPPED | OUTPATIENT
Start: 2024-08-30

## 2024-09-02 RX ORDER — BENZONATATE 200 MG/1
CAPSULE ORAL
Qty: 20 CAPSULE | Refills: 5 | Status: SHIPPED | OUTPATIENT
Start: 2024-09-02

## 2024-09-03 ENCOUNTER — HOSPITAL ENCOUNTER (OUTPATIENT)
Dept: INFUSION CENTER | Facility: CLINIC | Age: 69
Discharge: HOME | End: 2024-09-03
Payer: MEDICARE

## 2024-09-03 VITALS
HEIGHT: 73 IN | SYSTOLIC BLOOD PRESSURE: 155 MMHG | WEIGHT: 222.5 LBS | HEART RATE: 69 BPM | DIASTOLIC BLOOD PRESSURE: 97 MMHG | RESPIRATION RATE: 18 BRPM | TEMPERATURE: 97.6 F | BODY MASS INDEX: 29.49 KG/M2

## 2024-09-03 DIAGNOSIS — C91.10 CLL (CHRONIC LYMPHOCYTIC LEUKEMIA) (HCC): Primary | ICD-10-CM

## 2024-09-03 LAB
ALBUMIN SERPL BCG-MCNC: 4.1 G/DL (ref 3.5–5)
ALP SERPL-CCNC: 49 U/L (ref 34–104)
ALT SERPL W P-5'-P-CCNC: 38 U/L (ref 7–52)
ANION GAP SERPL CALCULATED.3IONS-SCNC: 6 MMOL/L (ref 4–13)
AST SERPL W P-5'-P-CCNC: 30 U/L (ref 13–39)
BASOPHILS # BLD AUTO: 0.07 THOUSANDS/ÂΜL (ref 0–0.1)
BASOPHILS NFR BLD AUTO: 1 % (ref 0–1)
BILIRUB SERPL-MCNC: 0.53 MG/DL (ref 0.2–1)
BUN SERPL-MCNC: 18 MG/DL (ref 5–25)
CALCIUM SERPL-MCNC: 9.5 MG/DL (ref 8.4–10.2)
CHLORIDE SERPL-SCNC: 110 MMOL/L (ref 96–108)
CO2 SERPL-SCNC: 26 MMOL/L (ref 21–32)
CREAT SERPL-MCNC: 0.92 MG/DL (ref 0.6–1.3)
EOSINOPHIL # BLD AUTO: 0.36 THOUSAND/ÂΜL (ref 0–0.61)
EOSINOPHIL NFR BLD AUTO: 7 % (ref 0–6)
ERYTHROCYTE [DISTWIDTH] IN BLOOD BY AUTOMATED COUNT: 14.4 % (ref 11.6–15.1)
GFR SERPL CREATININE-BSD FRML MDRD: 83 ML/MIN/1.73SQ M
GLUCOSE SERPL-MCNC: 73 MG/DL (ref 65–140)
HCT VFR BLD AUTO: 43.8 % (ref 36.5–49.3)
HGB BLD-MCNC: 14.3 G/DL (ref 12–17)
IGG SERPL-MCNC: 533 MG/DL (ref 635–1741)
IMM GRANULOCYTES # BLD AUTO: 0.27 THOUSAND/UL (ref 0–0.2)
IMM GRANULOCYTES NFR BLD AUTO: 5 % (ref 0–2)
LYMPHOCYTES # BLD AUTO: 0.88 THOUSANDS/ÂΜL (ref 0.6–4.47)
LYMPHOCYTES NFR BLD AUTO: 17 % (ref 14–44)
MCH RBC QN AUTO: 30.1 PG (ref 26.8–34.3)
MCHC RBC AUTO-ENTMCNC: 32.6 G/DL (ref 31.4–37.4)
MCV RBC AUTO: 92 FL (ref 82–98)
MONOCYTES # BLD AUTO: 0.81 THOUSAND/ÂΜL (ref 0.17–1.22)
MONOCYTES NFR BLD AUTO: 15 % (ref 4–12)
NEUTROPHILS # BLD AUTO: 2.87 THOUSANDS/ÂΜL (ref 1.85–7.62)
NEUTS SEG NFR BLD AUTO: 55 % (ref 43–75)
NRBC BLD AUTO-RTO: 0 /100 WBCS
PLATELET # BLD AUTO: 169 THOUSANDS/UL (ref 149–390)
PMV BLD AUTO: 11.3 FL (ref 8.9–12.7)
POTASSIUM SERPL-SCNC: 3.9 MMOL/L (ref 3.5–5.3)
PROT SERPL-MCNC: 6.2 G/DL (ref 6.4–8.4)
RBC # BLD AUTO: 4.75 MILLION/UL (ref 3.88–5.62)
RETICS # AUTO: NORMAL 10*3/UL (ref 14356–105094)
RETICS # CALC: 1.7 % (ref 0.37–1.87)
SODIUM SERPL-SCNC: 142 MMOL/L (ref 135–147)
WBC # BLD AUTO: 5.26 THOUSAND/UL (ref 4.31–10.16)

## 2024-09-03 PROCEDURE — 82784 ASSAY IGA/IGD/IGG/IGM EACH: CPT | Performed by: PHYSICIAN ASSISTANT

## 2024-09-03 PROCEDURE — 85045 AUTOMATED RETICULOCYTE COUNT: CPT | Performed by: PHYSICIAN ASSISTANT

## 2024-09-03 PROCEDURE — 96367 TX/PROPH/DG ADDL SEQ IV INF: CPT

## 2024-09-03 PROCEDURE — 96415 CHEMO IV INFUSION ADDL HR: CPT

## 2024-09-03 PROCEDURE — 96413 CHEMO IV INFUSION 1 HR: CPT

## 2024-09-03 PROCEDURE — 80053 COMPREHEN METABOLIC PANEL: CPT | Performed by: INTERNAL MEDICINE

## 2024-09-03 PROCEDURE — 85025 COMPLETE CBC W/AUTO DIFF WBC: CPT | Performed by: INTERNAL MEDICINE

## 2024-09-03 RX ORDER — ACETAMINOPHEN 325 MG/1
650 TABLET ORAL ONCE
Status: COMPLETED | OUTPATIENT
Start: 2024-09-03 | End: 2024-09-03

## 2024-09-03 RX ORDER — SODIUM CHLORIDE 9 MG/ML
20 INJECTION, SOLUTION INTRAVENOUS ONCE
Status: COMPLETED | OUTPATIENT
Start: 2024-09-03 | End: 2024-09-03

## 2024-09-03 RX ADMIN — ACETAMINOPHEN 650 MG: 325 TABLET ORAL at 08:30

## 2024-09-03 RX ADMIN — DIPHENHYDRAMINE HYDROCHLORIDE 25 MG: 50 INJECTION, SOLUTION INTRAMUSCULAR; INTRAVENOUS at 08:58

## 2024-09-03 RX ADMIN — DEXAMETHASONE SODIUM PHOSPHATE 20 MG: 10 INJECTION, SOLUTION INTRAMUSCULAR; INTRAVENOUS at 08:30

## 2024-09-03 RX ADMIN — OBINUTUZUMAB 1000 MG: 1000 INJECTION, SOLUTION, CONCENTRATE INTRAVENOUS at 09:55

## 2024-09-03 RX ADMIN — SODIUM CHLORIDE 20 ML/HR: 0.9 INJECTION, SOLUTION INTRAVENOUS at 08:29

## 2024-09-03 NOTE — PROGRESS NOTES
Pt tolerated treatment today with Gazyva without incident.  Pt declined AVS but is aware of his next appt on 9/17 at 0800

## 2024-09-03 NOTE — PLAN OF CARE
Problem: Potential for Falls  Goal: Patient will remain free of falls  Description: INTERVENTIONS:  - Educate patient/family on patient safety including physical limitations  - Instruct patient to call for assistance with activity   - Consult OT/PT to assist with strengthening/mobility   - Keep Call bell within reach  - Keep bed low and locked with side rails adjusted as appropriate  - Keep care items and personal belongings within reach  - Initiate and maintain comfort rounds  - Make Fall Risk Sign visible to staff  - Offer Toileting every    Hours, in advance of need  - Initiate/Maintain   alarm  - Obtain necessary fall risk management equipment:     - Apply yellow socks and bracelet for high fall risk patients  - Consider moving patient to room near nurses station  Outcome: Progressing     Problem: Potential for Falls  Goal: Patient will remain free of falls  Description: INTERVENTIONS:  - Educate patient/family on patient safety including physical limitations  - Instruct patient to call for assistance with activity   - Consult OT/PT to assist with strengthening/mobility   - Keep Call bell within reach  - Keep bed low and locked with side rails adjusted as appropriate  - Keep care items and personal belongings within reach  - Initiate and maintain comfort rounds  - Make Fall Risk Sign visible to staff  - Offer Toileting every    Hours, in advance of need  - Initiate/Maintain   alarm  - Obtain necessary fall risk management equipment:     - Apply yellow socks and bracelet for high fall risk patients  - Consider moving patient to room near nurses station  Outcome: Progressing

## 2024-09-09 ENCOUNTER — OFFICE VISIT (OUTPATIENT)
Dept: PODIATRY | Facility: CLINIC | Age: 69
End: 2024-09-09
Payer: MEDICARE

## 2024-09-09 ENCOUNTER — OFFICE VISIT (OUTPATIENT)
Dept: URGENT CARE | Facility: CLINIC | Age: 69
End: 2024-09-09
Payer: MEDICARE

## 2024-09-09 ENCOUNTER — APPOINTMENT (OUTPATIENT)
Dept: RADIOLOGY | Facility: CLINIC | Age: 69
End: 2024-09-09
Payer: MEDICARE

## 2024-09-09 VITALS
RESPIRATION RATE: 18 BRPM | SYSTOLIC BLOOD PRESSURE: 138 MMHG | HEIGHT: 72 IN | HEART RATE: 94 BPM | WEIGHT: 222 LBS | BODY MASS INDEX: 30.07 KG/M2 | DIASTOLIC BLOOD PRESSURE: 78 MMHG

## 2024-09-09 VITALS
OXYGEN SATURATION: 99 % | SYSTOLIC BLOOD PRESSURE: 126 MMHG | TEMPERATURE: 98.3 F | RESPIRATION RATE: 20 BRPM | DIASTOLIC BLOOD PRESSURE: 84 MMHG | HEART RATE: 83 BPM

## 2024-09-09 DIAGNOSIS — M79.672 LEFT FOOT PAIN: Primary | ICD-10-CM

## 2024-09-09 DIAGNOSIS — M79.672 LEFT FOOT PAIN: ICD-10-CM

## 2024-09-09 DIAGNOSIS — E11.9 CONTROLLED TYPE 2 DIABETES MELLITUS WITHOUT COMPLICATION, WITHOUT LONG-TERM CURRENT USE OF INSULIN (HCC): Primary | ICD-10-CM

## 2024-09-09 DIAGNOSIS — M77.32 HEEL SPUR, LEFT: ICD-10-CM

## 2024-09-09 DIAGNOSIS — M72.2 PLANTAR FASCIITIS: ICD-10-CM

## 2024-09-09 PROCEDURE — 20550 NJX 1 TENDON SHEATH/LIGAMENT: CPT | Performed by: PODIATRIST

## 2024-09-09 PROCEDURE — 99213 OFFICE O/P EST LOW 20 MIN: CPT | Performed by: NURSE PRACTITIONER

## 2024-09-09 PROCEDURE — 73630 X-RAY EXAM OF FOOT: CPT

## 2024-09-09 PROCEDURE — G0463 HOSPITAL OUTPT CLINIC VISIT: HCPCS | Performed by: NURSE PRACTITIONER

## 2024-09-09 PROCEDURE — 99203 OFFICE O/P NEW LOW 30 MIN: CPT | Performed by: PODIATRIST

## 2024-09-09 RX ORDER — BUPIVACAINE HYDROCHLORIDE 2.5 MG/ML
1 INJECTION, SOLUTION INFILTRATION; PERINEURAL
Status: SHIPPED | OUTPATIENT
Start: 2024-09-09

## 2024-09-09 RX ORDER — TRIAMCINOLONE ACETONIDE 40 MG/ML
20 INJECTION, SUSPENSION INTRA-ARTICULAR; INTRAMUSCULAR
Status: SHIPPED | OUTPATIENT
Start: 2024-09-09

## 2024-09-09 RX ORDER — LIDOCAINE HYDROCHLORIDE 10 MG/ML
1 INJECTION, SOLUTION INFILTRATION; PERINEURAL
Status: SHIPPED | OUTPATIENT
Start: 2024-09-09

## 2024-09-09 RX ADMIN — BUPIVACAINE HYDROCHLORIDE 1 ML: 2.5 INJECTION, SOLUTION INFILTRATION; PERINEURAL at 14:30

## 2024-09-09 RX ADMIN — LIDOCAINE HYDROCHLORIDE 1 ML: 10 INJECTION, SOLUTION INFILTRATION; PERINEURAL at 14:30

## 2024-09-09 RX ADMIN — TRIAMCINOLONE ACETONIDE 20 MG: 40 INJECTION, SUSPENSION INTRA-ARTICULAR; INTRAMUSCULAR at 14:30

## 2024-09-09 NOTE — PROGRESS NOTES
[facility]        NAME: Cayetano Lucero is a 69 y.o. male  : 1955    MRN: [id number]  DATE: 2024  TIME: 12:37 PM    Assessment and Plan   Left foot pain [M79.672]  1. Left foot pain  XR foot 3+ vw left    Ambulatory Referral to Podiatry      2. Heel spur, left          Xray done in office - images reviewed by myself - small spur  Uses oral steroids daily for CLL -   Ref to podiatry for possible injection for pain   Advised superfeet insoles -   Pt in agreement with plan    Patient Instructions     Follow up with PCP in 3-5 days.  Proceed to  ER if symptoms worsen.    Chief Complaint     Chief Complaint   Patient presents with    Foot Pain     L foot pain that started a few weeks ago. Pt complains the L heel (center) is painful.          History of Present Illness   Cayetano Lucero presents to the clinic c/o    Foot Pain (L foot pain that started a few weeks ago. Pt complains the L heel (center) is painful. )  Wears vans footwear all the time   Never had a problem before   Pain is less after steroid boost when he gets his chemo   Is on insulin for sugars.        Review of Systems   Review of Systems   All other systems reviewed and are negative.        Current Medications     Long-Term Medications   Medication Sig Dispense Refill    acyclovir (ZOVIRAX) 400 MG tablet TAKE 1 TABLET TWICE A DAY 60 tablet 11    amLODIPine (NORVASC) 10 mg tablet TAKE 1 TABLET DAILY 90 tablet 5    Blood Glucose Monitoring Suppl (FreeStyle Freedom Lite) w/Device KIT Use 3 (three) times a day 1 kit 0    citalopram (CeleXA) 40 mg tablet TAKE 1 TABLET DAILY 30 tablet 5    Diclofenac Sodium (VOLTAREN) 1 % Apply 4 g topically 4 (four) times a day 100 g 0    fenofibrate (TRICOR) 145 mg tablet TAKE 1 TABLET DAILY 90 tablet 0    gabapentin (NEURONTIN) 600 MG tablet TAKE 1 TABLET DAILY 90 tablet 5    Injection Device for Insulin (CeQur Simplicity 2U) JOE Use 1 patch every 3 days, disp 1 box of 10 patches for 30 days  supply 1 each 3    Injection Device for Insulin (CeQur Simplicity ) MISC Use to insert simplicity device. 1 each 1    insulin degludec (Tresiba FlexTouch) 100 units/mL injection pen Inject 18 Units under the skin daily at bedtime 15 mL 3    insulin lispro (Admelog SoloStar) 100 units/mL injection pen 12-14 units before meals 45 mL 1    insulin lispro (Admelog) 100 units/mL injection Use up to 65 units per day as directed via cecur simplicity device. 20 mL 3    Insulin Pen Needle (BD Pen Needle Inez U/F) 32G X 4 MM MISC Use 3 (three) times a day 300 each 0    Lancets (FREESTYLE) lancets Use as instructed--test 2 times a day    E 11.9 100 each 0    Lancets (freestyle) lancets TEST BLOOD SUGAR 3 TIMES A  each 1    LORazepam (ATIVAN) 0.5 mg tablet Take 1 tablet (0.5 mg total) by mouth daily as needed for anxiety 30 tablet 0    losartan (COZAAR) 100 MG tablet TAKE 1 TABLET DAILY 90 tablet 1    mometasone-formoterol (Dulera) 200-5 MCG/ACT inhaler Inhale 2 puffs 2 (two) times a day Rinse mouth after use. 13 g 1    multivitamin (THERAGRAN) TABS Take 1 tablet by mouth daily      naloxone (NARCAN) 4 mg/0.1 mL nasal spray Administer 1 spray into a nostril. If no response after 2-3 minutes, give another dose in the other nostril using a new spray. 1 each 1    obinutuzumab (GAZYVA) 1000 mg/40 mL SOLN Infuse into a venous catheter         Current Allergies     Allergies as of 09/09/2024 - Reviewed 09/09/2024   Allergen Reaction Noted    Heparin Other (See Comments) 06/06/2012    Macrolides and ketolides Other (See Comments) 01/13/2016    Simvastatin Myalgia 12/17/2021            The following portions of the patient's history were reviewed and updated as appropriate: allergies, current medications, past family history, past medical history, past social history, past surgical history and problem list.    Objective   /84   Pulse 83   Temp 98.3 °F (36.8 °C) (Tympanic)   Resp 20   SpO2 99%        Physical  Exam     Physical Exam  Vitals and nursing note reviewed.   Constitutional:       Appearance: Normal appearance. He is well-developed.   HENT:      Head: Normocephalic and atraumatic.   Eyes:      General: Lids are normal.      Conjunctiva/sclera: Conjunctivae normal.      Pupils: Pupils are equal, round, and reactive to light.   Cardiovascular:      Rate and Rhythm: Normal rate and regular rhythm.      Heart sounds: Normal heart sounds, S1 normal and S2 normal.   Pulmonary:      Effort: Pulmonary effort is normal.      Breath sounds: Normal breath sounds.   Musculoskeletal:        Feet:    Skin:     General: Skin is warm and dry.   Neurological:      Mental Status: He is alert.   Psychiatric:         Speech: Speech normal.         Behavior: Behavior normal.         Thought Content: Thought content normal.         Judgment: Judgment normal.

## 2024-09-09 NOTE — PATIENT INSTRUCTIONS
Patient Education     Heel Spurs   About this topic   The heel bone is also called the calcaneus. It is the largest bone in the foot. It takes most of the pressure and shock when walking or running. A heel spur is an extra growth of bone under your heel. The spur may rub on the tendons or ligaments of the foot. This may cause swelling and, sometimes, pain.  Treatment for heel spurs focuses on lifestyle changes. Cortisone injections or surgery may be needed if the lifestyle changes do not improve the pain.  What are the causes?   Heel spurs happen when calcium builds up on the bone. This may happen due to strain from your muscles, ligaments, and thick band of tissue at the bottom of your foot. This band of tissue is called the plantar fascia. You are more likely to develop bone spurs if you are older or overweight. Wearing shoes that do not fit the right way can also increase your chances. So do some illnesses like arthritis. Doing activities like running or dancing that put a lot of stress on the foot may cause bone spurs. Having a high arch in your foot makes you more likely to develop heel spurs. Wearing high heels often can also increase your chances.  What are the main signs?   Pain when you first stand up. This often gets better with walking.  Pain gets worse when you flex your foot up. The pain improves when you point your toes down.  The area of more pain is on the bottom of the foot, just in from of the heel bone.  How does the doctor diagnose this health problem?   Exam of the foot  X-ray  How does the doctor treat this health problem?   Doctors begin treating heel spurs with lifestyle changes. This includes things like:  Weight loss  Wearing shoes that fit well  Limiting use of high-heeled shoes  Stretching exercises  Modifying stressful activities on the feet  Taping to support the foot  If these changes do not improve signs, other treatments may be tried like:  Using heel pads and cushioned inserts,  especially during activities  Ice  Physical therapist or a foot doctor  Night splints that keep the foot flexed  The doctor may suggest removing the heel spurs. This is most often done if the signs have not improved after about 12 months of other treatments.  What drugs may be needed?   The doctor may order drugs to:  Help with pain and swelling  The doctor may give you a shot of an anti-inflammatory drug called a corticosteroid. This will help with swelling.  What problems could happen?   Pain  Stress on feet during activities  Not able to do activities like walking or running  What can be done to prevent this health problem?   Wear comfortable shoes.  Keep a healthy weight.  After an activity, rest your feet.  Stretch before activity.  Limit the use of high-heeled shoes  Last Reviewed Date   2020-06-12  Consumer Information Use and Disclaimer   This generalized information is a limited summary of diagnosis, treatment, and/or medication information. It is not meant to be comprehensive and should be used as a tool to help the user understand and/or assess potential diagnostic and treatment options. It does NOT include all information about conditions, treatments, medications, side effects, or risks that may apply to a specific patient. It is not intended to be medical advice or a substitute for the medical advice, diagnosis, or treatment of a health care provider based on the health care provider's examination and assessment of a patient’s specific and unique circumstances. Patients must speak with a health care provider for complete information about their health, medical questions, and treatment options, including any risks or benefits regarding use of medications. This information does not endorse any treatments or medications as safe, effective, or approved for treating a specific patient. [employer] and its affiliates disclaim any warranty or liability relating to this information or the use thereof. The  use of this information is governed by the Terms of Use, available at https://www.woltersSoundFocusuwer.com/en/know/clinical-effectiveness-terms   Copyright   Copyright © 2024 [employer] and its affiliates and/or licensors. All rights reserved.

## 2024-09-09 NOTE — PROGRESS NOTES
Ambulatory Visit  Name: Cayetano Lucero      : 1955      MRN: [id number]  Encounter Provider: Fletcher Hill DPM  Encounter Date: 2024   Encounter department: [facility] [place]    Discussed principles of diabetic footcare.  Vascular status reveals palpable posterior tibial artery vessels bilateral but absent dorsalis pedis.  Sensorium is intact per [place] monofilament wire.  Proprioception was absent in toes.    Urged patient to refrain from walking barefoot.    In regards to left heel pain, patient is dealing with plantars fasciitis.  Discussed treatment options.  Recommended stretching exercises and dispensed heel supports size 11.  Injected left heel with 0.5 cc Kenalog 40 along with 1 cc 1% Xylocaine and 1 cc 0.5% Marcaine.  Reappoint 4 weeks.    Assessment & Plan   1. Controlled type 2 diabetes mellitus without complication, without long-term current use of insulin ([facility])  2. Left foot pain  -     Ambulatory Referral to Podiatry  3. Plantar fasciitis      History of Present Illness     Cayetano Lucero is a 69 y.o. male who presents with left heel pain of 3-week duration.  Pain began without recalled trauma.  Symptoms of post static dyskinesia related.  No right heel pain present.    Patient is a type II diabetic.  He denies numbness or tingling in his feet.  He also is under care for chronic lymphocytic leukemia.  There is a   history of GI ulceration.    I personally reviewed a fasting blood sugar dated 2024.  It was 146.    I personally reviewed a fasting blood sugar dated 2024.  It was 112.    Review of Systems   Respiratory:          COPD   Cardiovascular:         History of coronary artery disease   Gastrointestinal:         GI ulcer history   Neurological:  Negative for weakness and numbness.   Psychiatric/Behavioral: Negative.             Objective     /78   Pulse 94   Resp 18   Ht 6' (1.829 m)   Wt 101 kg (222 lb)   BMI 30.11 kg/m²     Physical  Exam  Cardiovascular:      Pulses: no weak pulses.           Dorsalis pedis pulses are 2+ on the right side and 2+ on the left side.        Posterior tibial pulses are 2+ on the right side and 2+ on the left side.   Feet:      Right foot:      Skin integrity: No ulcer, skin breakdown, erythema, warmth, callus or dry skin.      Left foot:      Skin integrity: No ulcer, skin breakdown, erythema, warmth, callus or dry skin.         Diabetic Foot Exam    Patient's shoes and socks removed.    Right Foot/Ankle   Right Foot Inspection  Skin Exam: skin normal and skin intact. No dry skin, no warmth, no callus, no erythema, no maceration, no abnormal color, no pre-ulcer, no ulcer and no callus.     Toe Exam: ROM and strength within normal limits.     Sensory   Vibration: diminished  Proprioception: absent  Monofilament testing: intact    Vascular  Capillary refills: < 3 seconds  The right DP pulse is 2+. The right PT pulse is 2+.     Right Toe  - Comprehensive Exam  Ecchymosis: none  Arch: normal  Hammertoes: absent  Claw Toes: absent  Swelling: none   Tenderness: none       Left Foot/Ankle  Left Foot Inspection  Skin Exam: skin normal and skin intact. No dry skin, no warmth, no erythema, no maceration, normal color, no pre-ulcer, no ulcer and no callus.     Toe Exam: ROM and strength within normal limits.     Sensory   Vibration: intact  Proprioception: absent  Monofilament testing: intact    Vascular  Capillary refills: < 3 seconds  The left DP pulse is 2+. The left PT pulse is 2+.     Left Toe  - Comprehensive Exam  Ecchymosis: none  Arch: normal  Hammertoes: absent  Claw toes: absent  Swelling: none   Tenderness: plantar fascia and calcaneus tenderness       Assign Risk Category  No deformity present  No loss of protective sensation  No weak pulses  Risk: 0    Administrative Statements       Foot/lower extremity injection    Performed by: Fletcher Hill DPM  Authorized by: Fletcher Hill DPM    Procedure:     Other  Assisting Provider: No      Verbal consent obtained?: Yes      Risks and benefits: Risks, benefits and alternatives were discussed      Patient states understanding of procedure being performed: Yes      Patient identity confirmed:  Verbally with patient    Supporting Documentation:     Indications:  Pain    Procedure Details:                Ethyl Chloride was applied      Needle size: 25 G G    Ultrasound Guidance: no      Approach:  Medial    Laterality:  Left    Cyst Aspiration/Injection: No      Location: aponeurosis      Aponeurosis Structures: Plantar fascia origin      Injection Information:       Medications:  1 mL bupivacaine 0.25 %; 1 mL lidocaine 1 %; 20 mg triamcinolone acetonide 40 mg/mL

## 2024-09-11 DIAGNOSIS — E78.2 MIXED HYPERLIPIDEMIA: ICD-10-CM

## 2024-09-11 DIAGNOSIS — J44.9 CHRONIC OBSTRUCTIVE PULMONARY DISEASE, UNSPECIFIED COPD TYPE (HCC): ICD-10-CM

## 2024-09-11 RX ORDER — ALBUTEROL SULFATE 90 UG/1
2 AEROSOL, METERED RESPIRATORY (INHALATION) EVERY 6 HOURS PRN
Qty: 54 G | Refills: 5 | Status: SHIPPED | OUTPATIENT
Start: 2024-09-11

## 2024-09-11 RX ORDER — FENOFIBRATE 145 MG/1
TABLET, COATED ORAL
Qty: 60 TABLET | Refills: 5 | Status: SHIPPED | OUTPATIENT
Start: 2024-09-11

## 2024-09-17 ENCOUNTER — HOSPITAL ENCOUNTER (OUTPATIENT)
Dept: INFUSION CENTER | Facility: CLINIC | Age: 69
Discharge: HOME | End: 2024-09-17
Payer: MEDICARE

## 2024-09-17 VITALS
WEIGHT: 217.81 LBS | HEART RATE: 69 BPM | RESPIRATION RATE: 18 BRPM | TEMPERATURE: 97.4 F | DIASTOLIC BLOOD PRESSURE: 85 MMHG | BODY MASS INDEX: 29.54 KG/M2 | SYSTOLIC BLOOD PRESSURE: 160 MMHG

## 2024-09-17 DIAGNOSIS — R76.8 LOW SERUM IGG FOR AGE: Primary | ICD-10-CM

## 2024-09-17 DIAGNOSIS — D80.1 HYPOGAMMAGLOBULINEMIA, ACQUIRED (HCC): ICD-10-CM

## 2024-09-17 DIAGNOSIS — C91.10 CLL (CHRONIC LYMPHOCYTIC LEUKEMIA) (HCC): ICD-10-CM

## 2024-09-17 PROCEDURE — 96366 THER/PROPH/DIAG IV INF ADDON: CPT

## 2024-09-17 PROCEDURE — 96367 TX/PROPH/DG ADDL SEQ IV INF: CPT

## 2024-09-17 PROCEDURE — 96365 THER/PROPH/DIAG IV INF INIT: CPT

## 2024-09-17 RX ORDER — ACETAMINOPHEN 325 MG/1
650 TABLET ORAL ONCE
Status: COMPLETED | OUTPATIENT
Start: 2024-09-17 | End: 2024-09-17

## 2024-09-17 RX ORDER — ACETAMINOPHEN 325 MG/1
650 TABLET ORAL ONCE
OUTPATIENT
Start: 2024-10-15

## 2024-09-17 RX ORDER — SODIUM CHLORIDE 9 MG/ML
20 INJECTION, SOLUTION INTRAVENOUS ONCE
OUTPATIENT
Start: 2024-10-15

## 2024-09-17 RX ORDER — SODIUM CHLORIDE 9 MG/ML
20 INJECTION, SOLUTION INTRAVENOUS ONCE
Status: COMPLETED | OUTPATIENT
Start: 2024-09-17 | End: 2024-09-17

## 2024-09-17 RX ADMIN — Medication 20 G: at 09:22

## 2024-09-17 RX ADMIN — SODIUM CHLORIDE 20 ML/HR: 0.9 INJECTION, SOLUTION INTRAVENOUS at 08:18

## 2024-09-17 RX ADMIN — DIPHENHYDRAMINE HYDROCHLORIDE 12.5 MG: 50 INJECTION, SOLUTION INTRAMUSCULAR; INTRAVENOUS at 08:20

## 2024-09-17 RX ADMIN — DEXAMETHASONE SODIUM PHOSPHATE 4 MG: 10 INJECTION, SOLUTION INTRAMUSCULAR; INTRAVENOUS at 08:42

## 2024-09-17 RX ADMIN — ACETAMINOPHEN 650 MG: 325 TABLET ORAL at 08:20

## 2024-09-17 NOTE — PLAN OF CARE
Problem: Potential for Falls  Goal: Patient will remain free of falls  INTERVENTIONS:  - Assess patient frequently for physical needs  -  Identify cognitive and physical deficits and behaviors that affect risk of falls    -  [place] fall precautions as indicated by assessment   - Educate patient/family on patient safety including physical limitations  - Instruct patient to call for assistance with activity based on assessment  - Modify environment to reduce risk of injury  - Consider OT/PT consult to assist with strengthening/mobility   Outcome: Progressing None

## 2024-09-17 NOTE — PROGRESS NOTES
Pt offers no new complaints today, tolerated IGG without complications, confirmed appt back on 10-1-24 0800, AVS declined

## 2024-09-18 ENCOUNTER — TELEPHONE (OUTPATIENT)
Dept: ENDOCRINOLOGY | Facility: CLINIC | Age: 69
End: 2024-09-18

## 2024-09-25 RX ORDER — ACETAMINOPHEN 325 MG/1
650 TABLET ORAL ONCE
OUTPATIENT
Start: 2024-10-01

## 2024-09-25 RX ORDER — SODIUM CHLORIDE 9 MG/ML
20 INJECTION, SOLUTION INTRAVENOUS ONCE
OUTPATIENT
Start: 2024-10-01

## 2024-10-01 ENCOUNTER — HOSPITAL ENCOUNTER (OUTPATIENT)
Dept: INFUSION CENTER | Facility: CLINIC | Age: 69
Discharge: HOME | End: 2024-10-01
Payer: MEDICARE

## 2024-10-01 VITALS
WEIGHT: 219.58 LBS | RESPIRATION RATE: 20 BRPM | BODY MASS INDEX: 29.78 KG/M2 | SYSTOLIC BLOOD PRESSURE: 161 MMHG | HEART RATE: 65 BPM | TEMPERATURE: 96.9 F | DIASTOLIC BLOOD PRESSURE: 72 MMHG

## 2024-10-01 DIAGNOSIS — R76.8 LOW SERUM IGG FOR AGE: ICD-10-CM

## 2024-10-01 DIAGNOSIS — C91.10 CLL (CHRONIC LYMPHOCYTIC LEUKEMIA) (HCC): Primary | ICD-10-CM

## 2024-10-01 LAB
ALBUMIN SERPL BCG-MCNC: 4.2 G/DL (ref 3.5–5)
ALP SERPL-CCNC: 49 U/L (ref 34–104)
ALT SERPL W P-5'-P-CCNC: 34 U/L (ref 7–52)
ANION GAP SERPL CALCULATED.3IONS-SCNC: 5 MMOL/L (ref 4–13)
AST SERPL W P-5'-P-CCNC: 28 U/L (ref 13–39)
BASOPHILS # BLD AUTO: 0.08 THOUSANDS/ÂΜL (ref 0–0.1)
BASOPHILS NFR BLD AUTO: 1 % (ref 0–1)
BILIRUB SERPL-MCNC: 0.49 MG/DL (ref 0.2–1)
BUN SERPL-MCNC: 18 MG/DL (ref 5–25)
CALCIUM SERPL-MCNC: 9.2 MG/DL (ref 8.4–10.2)
CHLORIDE SERPL-SCNC: 108 MMOL/L (ref 96–108)
CO2 SERPL-SCNC: 26 MMOL/L (ref 21–32)
CREAT SERPL-MCNC: 0.95 MG/DL (ref 0.6–1.3)
EOSINOPHIL # BLD AUTO: 0.46 THOUSAND/ÂΜL (ref 0–0.61)
EOSINOPHIL NFR BLD AUTO: 7 % (ref 0–6)
ERYTHROCYTE [DISTWIDTH] IN BLOOD BY AUTOMATED COUNT: 14.1 % (ref 11.6–15.1)
GFR SERPL CREATININE-BSD FRML MDRD: 80 ML/MIN/1.73SQ M
GLUCOSE SERPL-MCNC: 107 MG/DL (ref 65–140)
HCT VFR BLD AUTO: 45 % (ref 36.5–49.3)
HGB BLD-MCNC: 14.8 G/DL (ref 12–17)
IGG SERPL-MCNC: 529 MG/DL (ref 635–1741)
IMM GRANULOCYTES # BLD AUTO: 0.39 THOUSAND/UL (ref 0–0.2)
IMM GRANULOCYTES NFR BLD AUTO: 6 % (ref 0–2)
LYMPHOCYTES # BLD AUTO: 0.99 THOUSANDS/ÂΜL (ref 0.6–4.47)
LYMPHOCYTES NFR BLD AUTO: 16 % (ref 14–44)
MCH RBC QN AUTO: 31.1 PG (ref 26.8–34.3)
MCHC RBC AUTO-ENTMCNC: 32.9 G/DL (ref 31.4–37.4)
MCV RBC AUTO: 95 FL (ref 82–98)
MONOCYTES # BLD AUTO: 0.91 THOUSAND/ÂΜL (ref 0.17–1.22)
MONOCYTES NFR BLD AUTO: 14 % (ref 4–12)
NEUTROPHILS # BLD AUTO: 3.48 THOUSANDS/ÂΜL (ref 1.85–7.62)
NEUTS SEG NFR BLD AUTO: 56 % (ref 43–75)
NRBC BLD AUTO-RTO: 0 /100 WBCS
PLATELET # BLD AUTO: 170 THOUSANDS/UL (ref 149–390)
PMV BLD AUTO: 11.4 FL (ref 8.9–12.7)
POTASSIUM SERPL-SCNC: 3.9 MMOL/L (ref 3.5–5.3)
PROT SERPL-MCNC: 6.3 G/DL (ref 6.4–8.4)
RBC # BLD AUTO: 4.76 MILLION/UL (ref 3.88–5.62)
RETICS # AUTO: NORMAL 10*3/UL (ref 14356–105094)
RETICS # CALC: 1.54 % (ref 0.37–1.87)
SODIUM SERPL-SCNC: 139 MMOL/L (ref 135–147)
WBC # BLD AUTO: 6.31 THOUSAND/UL (ref 4.31–10.16)

## 2024-10-01 PROCEDURE — 82784 ASSAY IGA/IGD/IGG/IGM EACH: CPT

## 2024-10-01 PROCEDURE — 80053 COMPREHEN METABOLIC PANEL: CPT

## 2024-10-01 PROCEDURE — 96367 TX/PROPH/DG ADDL SEQ IV INF: CPT

## 2024-10-01 PROCEDURE — 96413 CHEMO IV INFUSION 1 HR: CPT

## 2024-10-01 PROCEDURE — 96415 CHEMO IV INFUSION ADDL HR: CPT

## 2024-10-01 PROCEDURE — 85045 AUTOMATED RETICULOCYTE COUNT: CPT | Performed by: PHYSICIAN ASSISTANT

## 2024-10-01 PROCEDURE — 85025 COMPLETE CBC W/AUTO DIFF WBC: CPT

## 2024-10-01 RX ORDER — ACETAMINOPHEN 325 MG/1
650 TABLET ORAL ONCE
Status: COMPLETED | OUTPATIENT
Start: 2024-10-01 | End: 2024-10-01

## 2024-10-01 RX ORDER — SODIUM CHLORIDE 9 MG/ML
20 INJECTION, SOLUTION INTRAVENOUS ONCE
Status: COMPLETED | OUTPATIENT
Start: 2024-10-01 | End: 2024-10-01

## 2024-10-01 RX ADMIN — OBINUTUZUMAB 1000 MG: 1000 INJECTION, SOLUTION, CONCENTRATE INTRAVENOUS at 09:44

## 2024-10-01 RX ADMIN — SODIUM CHLORIDE 20 ML/HR: 0.9 INJECTION, SOLUTION INTRAVENOUS at 08:34

## 2024-10-01 RX ADMIN — DIPHENHYDRAMINE HYDROCHLORIDE 25 MG: 50 INJECTION, SOLUTION INTRAMUSCULAR; INTRAVENOUS at 09:01

## 2024-10-01 RX ADMIN — DEXAMETHASONE SODIUM PHOSPHATE 20 MG: 10 INJECTION, SOLUTION INTRAMUSCULAR; INTRAVENOUS at 08:40

## 2024-10-01 RX ADMIN — ACETAMINOPHEN 650 MG: 325 TABLET ORAL at 08:37

## 2024-10-01 NOTE — PROGRESS NOTES
Cayetano Lucero  tolerated treatment well with no complications.      Cayetano Lucero is aware of future appt on 10/15 at 8am.     AVS printed and given to Cayetano Lucero:    No (Declined by Cayetano Lucero)

## 2024-10-03 DIAGNOSIS — Z79.4 TYPE 2 DIABETES MELLITUS WITH HYPERGLYCEMIA, WITH LONG-TERM CURRENT USE OF INSULIN (HCC): ICD-10-CM

## 2024-10-03 DIAGNOSIS — F41.9 ANXIETY DISORDER, UNSPECIFIED TYPE: ICD-10-CM

## 2024-10-03 DIAGNOSIS — E11.65 TYPE 2 DIABETES MELLITUS WITH HYPERGLYCEMIA, WITH LONG-TERM CURRENT USE OF INSULIN (HCC): ICD-10-CM

## 2024-10-03 RX ORDER — INSULIN LISPRO 100 [IU]/ML
INJECTION, SOLUTION INTRAVENOUS; SUBCUTANEOUS
Qty: 45 ML | Refills: 1 | Status: SHIPPED | OUTPATIENT
Start: 2024-10-03

## 2024-10-03 RX ORDER — LORAZEPAM 0.5 MG/1
0.5 TABLET ORAL DAILY PRN
Qty: 30 TABLET | Refills: 0 | Status: SHIPPED | OUTPATIENT
Start: 2024-10-03

## 2024-10-15 ENCOUNTER — HOSPITAL ENCOUNTER (OUTPATIENT)
Dept: INFUSION CENTER | Facility: CLINIC | Age: 69
Discharge: HOME | End: 2024-10-15
Payer: MEDICARE

## 2024-10-15 VITALS
WEIGHT: 221 LBS | TEMPERATURE: 97.8 F | RESPIRATION RATE: 18 BRPM | HEART RATE: 72 BPM | DIASTOLIC BLOOD PRESSURE: 93 MMHG | SYSTOLIC BLOOD PRESSURE: 165 MMHG | BODY MASS INDEX: 29.97 KG/M2

## 2024-10-15 DIAGNOSIS — D80.1 HYPOGAMMAGLOBULINEMIA, ACQUIRED (HCC): ICD-10-CM

## 2024-10-15 DIAGNOSIS — C91.10 CLL (CHRONIC LYMPHOCYTIC LEUKEMIA) (HCC): ICD-10-CM

## 2024-10-15 DIAGNOSIS — R76.8 LOW SERUM IGG FOR AGE: Primary | ICD-10-CM

## 2024-10-15 PROCEDURE — 96367 TX/PROPH/DG ADDL SEQ IV INF: CPT

## 2024-10-15 PROCEDURE — 96365 THER/PROPH/DIAG IV INF INIT: CPT

## 2024-10-15 PROCEDURE — 96366 THER/PROPH/DIAG IV INF ADDON: CPT

## 2024-10-15 RX ORDER — SODIUM CHLORIDE 9 MG/ML
20 INJECTION, SOLUTION INTRAVENOUS ONCE
OUTPATIENT
Start: 2024-11-12

## 2024-10-15 RX ORDER — SODIUM CHLORIDE 9 MG/ML
20 INJECTION, SOLUTION INTRAVENOUS ONCE
Status: COMPLETED | OUTPATIENT
Start: 2024-10-15 | End: 2024-10-15

## 2024-10-15 RX ORDER — ACETAMINOPHEN 325 MG/1
650 TABLET ORAL ONCE
OUTPATIENT
Start: 2024-11-12

## 2024-10-15 RX ORDER — ACETAMINOPHEN 325 MG/1
650 TABLET ORAL ONCE
Status: COMPLETED | OUTPATIENT
Start: 2024-10-15 | End: 2024-10-15

## 2024-10-15 RX ADMIN — Medication 20 G: at 09:24

## 2024-10-15 RX ADMIN — DIPHENHYDRAMINE HYDROCHLORIDE 12.5 MG: 50 INJECTION, SOLUTION INTRAMUSCULAR; INTRAVENOUS at 08:28

## 2024-10-15 RX ADMIN — ACETAMINOPHEN 650 MG: 325 TABLET ORAL at 08:27

## 2024-10-15 RX ADMIN — SODIUM CHLORIDE 20 ML/HR: 0.9 INJECTION, SOLUTION INTRAVENOUS at 08:22

## 2024-10-15 RX ADMIN — DEXAMETHASONE SODIUM PHOSPHATE 4 MG: 10 INJECTION, SOLUTION INTRAMUSCULAR; INTRAVENOUS at 08:50

## 2024-10-15 NOTE — PROGRESS NOTES
Pt offers no new complaints today, tolerated IGG without complications, confirmed appt back on 10-29-24 0800, AVS declined

## 2024-10-17 NOTE — PROGRESS NOTES
Negar Liriano is calling to request a refill on the following medication(s):    Medication Request:  Requested Prescriptions     Pending Prescriptions Disp Refills    OLANZapine (ZYPREXA) 10 MG tablet [Pharmacy Med Name: OLANZapine 10 MG TABLET] 30 tablet 0     Sig: TAKE ONE TABLET BY MOUTH ONCE NIGHTLY       Last Visit Date (If Applicable):  9/24/2024    Next Visit Date:    Visit date not found                Oncology Finance Advocacy Intake and Intervention  Oncology Finance Counselor/Advocate placed call to patient. This writer informed patient that this writer is here to assist patient with billing questions, financial assistance, payment/payment plans, quotes, copayment assistance, insurance optimization, and insurance navigation.    This writer conducted a thorough benefit review of copayment, deductible, and out of pocket cost. This information is documented below and has been reviewed with patient.     *RTE DID NOT POPULATE DUE TO MEDICARE + SUPP*  Copayment:NA  Deductible:NA  Out of Pocket Cost:NA  Insurance optimization (Limited benefit vs self-pay):NA  Patient assistance status:[employer]  Free Drug Applications:[employer]  Oral Chemo Application: [employer], [employer] Pending  BIN#: NA  PCN#: [id number]  GRP#: [id number]  Copay:$ NA  Interventions:  Spoke to PT regarding [employer] application. PT has 2 pills left before he runs out. Janiya transferred his coverage to [employer] and PT contacted us today to inform us. Free drug process initiated with [employer]. [employer] representative informed this writer that if all applications documents are received, including a document showing exhausted denial of other funding source, application can be processed in 10 business days. This writer contacted [employer] for fill potential and co-payment amount. PT was agreeable to e-signature.      Information above was review thoroughly with patient and patient was advise of possible assistance programs/interventions. If any question arise patient can contact this writer at below information. This information was given to patient at time of contact.      aRin Martinez MPH  Phone:[phone]  Email: [email or web address]

## 2024-10-23 ENCOUNTER — HOSPITAL ENCOUNTER (OUTPATIENT)
Dept: BONE DENSITY | Facility: MEDICAL CENTER | Age: 69
Discharge: HOME | End: 2024-10-23
Payer: MEDICARE

## 2024-10-23 DIAGNOSIS — M81.8 DRUG-INDUCED OSTEOPOROSIS: ICD-10-CM

## 2024-10-23 DIAGNOSIS — T50.905A DRUG-INDUCED OSTEOPOROSIS: ICD-10-CM

## 2024-10-23 PROCEDURE — 77080 DXA BONE DENSITY AXIAL: CPT

## 2024-10-25 RX ORDER — ACETAMINOPHEN 325 MG/1
650 TABLET ORAL ONCE
OUTPATIENT
Start: 2024-10-29

## 2024-10-25 RX ORDER — SODIUM CHLORIDE 9 MG/ML
20 INJECTION, SOLUTION INTRAVENOUS ONCE
OUTPATIENT
Start: 2024-10-29

## 2024-10-29 ENCOUNTER — HOSPITAL ENCOUNTER (OUTPATIENT)
Dept: INFUSION CENTER | Facility: CLINIC | Age: 69
Discharge: HOME | End: 2024-10-29
Payer: MEDICARE

## 2024-10-29 VITALS
RESPIRATION RATE: 20 BRPM | HEART RATE: 69 BPM | DIASTOLIC BLOOD PRESSURE: 90 MMHG | SYSTOLIC BLOOD PRESSURE: 162 MMHG | TEMPERATURE: 97 F

## 2024-10-29 DIAGNOSIS — C91.10 CLL (CHRONIC LYMPHOCYTIC LEUKEMIA) (HCC): Primary | ICD-10-CM

## 2024-10-29 DIAGNOSIS — R76.8 LOW SERUM IGG FOR AGE: ICD-10-CM

## 2024-10-29 LAB
ALBUMIN SERPL BCG-MCNC: 4.2 G/DL (ref 3.5–5)
ALP SERPL-CCNC: 42 U/L (ref 34–104)
ALT SERPL W P-5'-P-CCNC: 29 U/L (ref 7–52)
ANION GAP SERPL CALCULATED.3IONS-SCNC: 5 MMOL/L (ref 4–13)
AST SERPL W P-5'-P-CCNC: 30 U/L (ref 13–39)
BASOPHILS # BLD MANUAL: 0.07 THOUSAND/UL (ref 0–0.1)
BASOPHILS NFR MAR MANUAL: 1 % (ref 0–1)
BILIRUB SERPL-MCNC: 0.6 MG/DL (ref 0.2–1)
BUN SERPL-MCNC: 20 MG/DL (ref 5–25)
BURR CELLS BLD QL SMEAR: PRESENT
CALCIUM SERPL-MCNC: 9.6 MG/DL (ref 8.4–10.2)
CHLORIDE SERPL-SCNC: 108 MMOL/L (ref 96–108)
CO2 SERPL-SCNC: 27 MMOL/L (ref 21–32)
CREAT SERPL-MCNC: 0.99 MG/DL (ref 0.6–1.3)
EOSINOPHIL # BLD MANUAL: 0.29 THOUSAND/UL (ref 0–0.4)
EOSINOPHIL NFR BLD MANUAL: 4 % (ref 0–6)
ERYTHROCYTE [DISTWIDTH] IN BLOOD BY AUTOMATED COUNT: 13.7 % (ref 11.6–15.1)
GFR SERPL CREATININE-BSD FRML MDRD: 76 ML/MIN/1.73SQ M
GLUCOSE SERPL-MCNC: 98 MG/DL (ref 65–140)
HCT VFR BLD AUTO: 43.7 % (ref 36.5–49.3)
HGB BLD-MCNC: 14.4 G/DL (ref 12–17)
IGG SERPL-MCNC: 519 MG/DL (ref 635–1741)
LYMPHOCYTES # BLD AUTO: 1.23 THOUSAND/UL (ref 0.6–4.47)
LYMPHOCYTES # BLD AUTO: 17 % (ref 14–44)
MCH RBC QN AUTO: 31.2 PG (ref 26.8–34.3)
MCHC RBC AUTO-ENTMCNC: 33 G/DL (ref 31.4–37.4)
MCV RBC AUTO: 95 FL (ref 82–98)
MONOCYTES # BLD AUTO: 1.01 THOUSAND/UL (ref 0–1.22)
MONOCYTES NFR BLD: 14 % (ref 4–12)
NEUTROPHILS # BLD MANUAL: 4.63 THOUSAND/UL (ref 1.85–7.62)
NEUTS BAND NFR BLD MANUAL: 1 % (ref 0–8)
NEUTS SEG NFR BLD AUTO: 63 % (ref 43–75)
PLATELET # BLD AUTO: 187 THOUSANDS/UL (ref 149–390)
PLATELET BLD QL SMEAR: ADEQUATE
PMV BLD AUTO: 11.4 FL (ref 8.9–12.7)
POTASSIUM SERPL-SCNC: 4 MMOL/L (ref 3.5–5.3)
PROT SERPL-MCNC: 6.4 G/DL (ref 6.4–8.4)
RBC # BLD AUTO: 4.61 MILLION/UL (ref 3.88–5.62)
RBC MORPH BLD: PRESENT
RETICS # AUTO: NORMAL 10*3/UL (ref 14356–105094)
RETICS # CALC: 1.61 % (ref 0.37–1.87)
SODIUM SERPL-SCNC: 140 MMOL/L (ref 135–147)
WBC # BLD AUTO: 7.23 THOUSAND/UL (ref 4.31–10.16)

## 2024-10-29 PROCEDURE — 82784 ASSAY IGA/IGD/IGG/IGM EACH: CPT

## 2024-10-29 PROCEDURE — 85045 AUTOMATED RETICULOCYTE COUNT: CPT | Performed by: PHYSICIAN ASSISTANT

## 2024-10-29 PROCEDURE — 85027 COMPLETE CBC AUTOMATED: CPT

## 2024-10-29 PROCEDURE — 85007 BL SMEAR W/DIFF WBC COUNT: CPT

## 2024-10-29 PROCEDURE — 96413 CHEMO IV INFUSION 1 HR: CPT

## 2024-10-29 PROCEDURE — 96367 TX/PROPH/DG ADDL SEQ IV INF: CPT

## 2024-10-29 PROCEDURE — 80053 COMPREHEN METABOLIC PANEL: CPT

## 2024-10-29 PROCEDURE — 96415 CHEMO IV INFUSION ADDL HR: CPT

## 2024-10-29 RX ORDER — SODIUM CHLORIDE 9 MG/ML
20 INJECTION, SOLUTION INTRAVENOUS ONCE
Status: COMPLETED | OUTPATIENT
Start: 2024-10-29 | End: 2024-10-29

## 2024-10-29 RX ORDER — ACETAMINOPHEN 325 MG/1
650 TABLET ORAL ONCE
Status: COMPLETED | OUTPATIENT
Start: 2024-10-29 | End: 2024-10-29

## 2024-10-29 RX ADMIN — DEXAMETHASONE SODIUM PHOSPHATE 20 MG: 10 INJECTION, SOLUTION INTRAMUSCULAR; INTRAVENOUS at 08:40

## 2024-10-29 RX ADMIN — ACETAMINOPHEN 650 MG: 325 TABLET ORAL at 08:40

## 2024-10-29 RX ADMIN — OBINUTUZUMAB 1000 MG: 1000 INJECTION, SOLUTION, CONCENTRATE INTRAVENOUS at 09:54

## 2024-10-29 RX ADMIN — SODIUM CHLORIDE 20 ML/HR: 0.9 INJECTION, SOLUTION INTRAVENOUS at 08:37

## 2024-10-29 RX ADMIN — DIPHENHYDRAMINE HYDROCHLORIDE 25 MG: 50 INJECTION, SOLUTION INTRAMUSCULAR; INTRAVENOUS at 09:01

## 2024-10-29 NOTE — PROGRESS NOTES
Cayetano Lucero  tolerated treatment well with no complications.      Cayetano Lucero is aware of future appt on 11/12 at 8am.     AVS printed and given to Cayetano Lucero:    No (Declined by Cayetano Lucero)

## 2024-10-30 ENCOUNTER — OFFICE VISIT (OUTPATIENT)
Dept: PODIATRY | Facility: CLINIC | Age: 69
End: 2024-10-30
Payer: MEDICARE

## 2024-10-30 VITALS
RESPIRATION RATE: 18 BRPM | DIASTOLIC BLOOD PRESSURE: 85 MMHG | SYSTOLIC BLOOD PRESSURE: 160 MMHG | WEIGHT: 222 LBS | BODY MASS INDEX: 30.07 KG/M2 | HEIGHT: 72 IN

## 2024-10-30 DIAGNOSIS — E11.9 CONTROLLED TYPE 2 DIABETES MELLITUS WITHOUT COMPLICATION, WITHOUT LONG-TERM CURRENT USE OF INSULIN (HCC): ICD-10-CM

## 2024-10-30 DIAGNOSIS — M72.2 PLANTAR FASCIITIS: Primary | ICD-10-CM

## 2024-10-30 DIAGNOSIS — M79.672 LEFT FOOT PAIN: ICD-10-CM

## 2024-10-30 PROCEDURE — 99212 OFFICE O/P EST SF 10 MIN: CPT | Performed by: PODIATRIST

## 2024-10-30 NOTE — PROGRESS NOTES
Patient present for assessment of left foot.  He is uncertain as to how well he responded to cortisone injection but has minimal heel or arch pain at this time.  He thinks it is related to wearing more supportive shoes.  No additional treatment is needed.  Reappoint as needed

## 2024-11-04 ENCOUNTER — OFFICE VISIT (OUTPATIENT)
Dept: CARDIOLOGY CLINIC | Facility: CLINIC | Age: 69
End: 2024-11-04
Payer: MEDICARE

## 2024-11-04 VITALS
HEIGHT: 72 IN | DIASTOLIC BLOOD PRESSURE: 82 MMHG | HEART RATE: 79 BPM | OXYGEN SATURATION: 98 % | SYSTOLIC BLOOD PRESSURE: 150 MMHG | WEIGHT: 220 LBS | BODY MASS INDEX: 29.8 KG/M2

## 2024-11-04 DIAGNOSIS — I25.10 CORONARY ARTERY DISEASE INVOLVING NATIVE CORONARY ARTERY OF NATIVE HEART WITHOUT ANGINA PECTORIS: Primary | ICD-10-CM

## 2024-11-04 DIAGNOSIS — E78.2 MIXED HYPERLIPIDEMIA: ICD-10-CM

## 2024-11-04 DIAGNOSIS — I10 PRIMARY HYPERTENSION: ICD-10-CM

## 2024-11-04 PROCEDURE — 99214 OFFICE O/P EST MOD 30 MIN: CPT | Performed by: INTERNAL MEDICINE

## 2024-11-04 RX ORDER — LABETALOL 100 MG/1
100 TABLET, FILM COATED ORAL 2 TIMES DAILY
Qty: 180 TABLET | Refills: 3 | Status: SHIPPED | OUTPATIENT
Start: 2024-11-04

## 2024-11-04 NOTE — PROGRESS NOTES
Subjective:        Patient ID: Cayetano Lucero is a 69 y.o. male.    Chief Complaint:  The patient presented to this office for the purpose of cardiac follow-up.  He is known to have a history of coronary artery disease with hypertension and hyperlipidemia as well as diabetes mellitus and CLL.  The patient has been feeling well from the cardiac standpoint denying any symptoms of chest pain.  He does have mild dyspnea on exertion but he does not interfere with his usual activity.  He denies any symptoms of palpitation, dizziness or syncope.  He has no leg edema.  The patient was noted on several occasions to have elevated blood pressure.      The following portions of the patient's history were reviewed and updated as appropriate: allergies, current medications, past family history, past medical history, past social history, past surgical history, and problem list.  Review of Systems   Constitutional: Negative.   Cardiovascular:  Positive for dyspnea on exertion. Negative for chest pain, leg swelling, palpitations and syncope.   Respiratory: Negative.     Psychiatric/Behavioral: Negative.            Objective:     Physical Exam  Constitutional:       Appearance: Normal appearance.   HENT:      Head: Normocephalic and atraumatic.   Cardiovascular:      Rate and Rhythm: Normal rate and regular rhythm.      Heart sounds: Normal heart sounds.   Pulmonary:      Effort: Pulmonary effort is normal.      Breath sounds: Normal breath sounds.   Musculoskeletal:      Right lower leg: No edema.      Left lower leg: No edema.   Skin:     General: Skin is warm and dry.   Neurological:      Mental Status: He is alert and oriented to person, place, and time.   Psychiatric:         Mood and Affect: Mood normal.         Behavior: Behavior normal.         Lab Review:   Lab Results   Component Value Date     12/29/2015    K 4.0 10/29/2024    K 4.0 12/29/2015     10/29/2024     12/29/2015    CO2 27 10/29/2024    CO2  26.3 12/29/2015    BUN 20 10/29/2024    BUN 26 12/29/2015    CREATININE 0.99 10/29/2024    CREATININE 1.25 12/29/2015    GLUCOSE 109 12/29/2015    CALCIUM 9.6 10/29/2024    CALCIUM 9.0 12/29/2015         Assessment:       1. Coronary artery disease involving native coronary artery of native heart without angina pectoris        2. Primary hypertension  labetalol (NORMODYNE) 100 mg tablet      3. Mixed hyperlipidemia           Coronary artery disease, stable.  No symptoms of angina or signs of heart failure.  We will continue present regimen.    Hypertension, less than optimally controlled.  The patient has been noted to have elevated blood pressure during his infusion sessions.  The patient will continue losartan and amlodipine at the same dose and I will start labetalol initially at 100 mg twice a day.  The patient will continue to monitor his blood pressure at home and let me know if it continues to be elevated.    Hyperlipidemia, stable.  The patient will continue fenofibrate.  Plan:       The patient will be started on labetalol 100 mg twice a day and he will continue other medications unchanged.  The patient will monitor his blood pressure at home and let me know if it is elevated.

## 2024-11-04 NOTE — PATIENT INSTRUCTIONS
The patient will be started on labetalol 100 mg twice a day.  Other medications remain the same.  The patient will check blood pressure at home and let me know if it is elevated.

## 2024-11-05 DIAGNOSIS — I10 ESSENTIAL HYPERTENSION: ICD-10-CM

## 2024-11-05 RX ORDER — LOSARTAN POTASSIUM 100 MG/1
TABLET ORAL
Qty: 60 TABLET | Refills: 5 | Status: SHIPPED | OUTPATIENT
Start: 2024-11-05

## 2024-11-06 NOTE — PLAN OF CARE
Problem: Potential for Falls  Goal: Patient will remain free of falls  INTERVENTIONS:  - Assess patient frequently for physical needs  - Identify cognitive and physical deficits and behaviors that affect risk of falls    - [place] fall precautions as indicated by assessment   - Educate patient/family on patient safety including physical limitations  - Instruct patient to call for assistance with activity based on assessment  - Modify environment to reduce risk of injury  - Consider OT/PT consult to assist with strengthening/mobility   Outcome: Progressing
none

## 2024-11-12 ENCOUNTER — HOSPITAL ENCOUNTER (OUTPATIENT)
Dept: INFUSION CENTER | Facility: CLINIC | Age: 69
Discharge: HOME | End: 2024-11-12
Payer: MEDICARE

## 2024-11-12 VITALS
DIASTOLIC BLOOD PRESSURE: 92 MMHG | HEART RATE: 76 BPM | SYSTOLIC BLOOD PRESSURE: 163 MMHG | TEMPERATURE: 98 F | RESPIRATION RATE: 16 BRPM

## 2024-11-12 DIAGNOSIS — R76.8 LOW SERUM IGG FOR AGE: Primary | ICD-10-CM

## 2024-11-12 DIAGNOSIS — C91.10 CLL (CHRONIC LYMPHOCYTIC LEUKEMIA) (HCC): ICD-10-CM

## 2024-11-12 DIAGNOSIS — D80.1 HYPOGAMMAGLOBULINEMIA, ACQUIRED (HCC): ICD-10-CM

## 2024-11-12 PROCEDURE — 96367 TX/PROPH/DG ADDL SEQ IV INF: CPT

## 2024-11-12 PROCEDURE — 96366 THER/PROPH/DIAG IV INF ADDON: CPT

## 2024-11-12 PROCEDURE — 96365 THER/PROPH/DIAG IV INF INIT: CPT

## 2024-11-12 RX ORDER — ACETAMINOPHEN 325 MG/1
650 TABLET ORAL ONCE
Status: COMPLETED | OUTPATIENT
Start: 2024-11-12 | End: 2024-11-12

## 2024-11-12 RX ORDER — ACETAMINOPHEN 325 MG/1
650 TABLET ORAL ONCE
OUTPATIENT
Start: 2024-12-10

## 2024-11-12 RX ORDER — SODIUM CHLORIDE 9 MG/ML
20 INJECTION, SOLUTION INTRAVENOUS ONCE
OUTPATIENT
Start: 2024-12-10

## 2024-11-12 RX ORDER — SODIUM CHLORIDE 9 MG/ML
20 INJECTION, SOLUTION INTRAVENOUS ONCE
Status: COMPLETED | OUTPATIENT
Start: 2024-11-12 | End: 2024-11-12

## 2024-11-12 RX ADMIN — ACETAMINOPHEN 650 MG: 325 TABLET ORAL at 08:26

## 2024-11-12 RX ADMIN — SODIUM CHLORIDE 20 ML/HR: 0.9 INJECTION, SOLUTION INTRAVENOUS at 08:25

## 2024-11-12 RX ADMIN — Medication 20 G: at 09:29

## 2024-11-12 RX ADMIN — DEXAMETHASONE SODIUM PHOSPHATE 4 MG: 10 INJECTION, SOLUTION INTRAMUSCULAR; INTRAVENOUS at 08:52

## 2024-11-12 RX ADMIN — DIPHENHYDRAMINE HYDROCHLORIDE 12.5 MG: 50 INJECTION, SOLUTION INTRAMUSCULAR; INTRAVENOUS at 08:26

## 2024-11-12 NOTE — PROGRESS NOTES
Pt offers no new complaints today, tolerated IGG without complications, confirmed appt back on 11-26-24 0800 AVS declined.

## 2024-11-13 DIAGNOSIS — I10 ESSENTIAL HYPERTENSION: ICD-10-CM

## 2024-11-13 RX ORDER — AMLODIPINE BESYLATE 10 MG/1
10 TABLET ORAL DAILY
Qty: 90 TABLET | Refills: 1 | Status: SHIPPED | OUTPATIENT
Start: 2024-11-13

## 2024-11-17 DIAGNOSIS — C91.10 CLL (CHRONIC LYMPHOCYTIC LEUKEMIA) (HCC): Primary | ICD-10-CM

## 2024-11-17 DIAGNOSIS — D80.1 HYPOGAMMAGLOBULINEMIA, ACQUIRED (HCC): ICD-10-CM

## 2024-11-17 DIAGNOSIS — R76.8 LOW SERUM IGG FOR AGE: ICD-10-CM

## 2024-11-20 ENCOUNTER — OFFICE VISIT (OUTPATIENT)
Dept: HEMATOLOGY ONCOLOGY | Facility: CLINIC | Age: 69
End: 2024-11-20
Payer: MEDICARE

## 2024-11-20 VITALS
DIASTOLIC BLOOD PRESSURE: 74 MMHG | HEIGHT: 72 IN | BODY MASS INDEX: 30.07 KG/M2 | RESPIRATION RATE: 18 BRPM | TEMPERATURE: 97.5 F | OXYGEN SATURATION: 99 % | SYSTOLIC BLOOD PRESSURE: 128 MMHG | WEIGHT: 222 LBS | HEART RATE: 72 BPM

## 2024-11-20 DIAGNOSIS — D59.19 OTHER AUTOIMMUNE HEMOLYTIC ANEMIA (HCC): ICD-10-CM

## 2024-11-20 DIAGNOSIS — M25.532 PAIN OF BOTH WRIST JOINTS: Primary | ICD-10-CM

## 2024-11-20 DIAGNOSIS — C91.10 CLL (CHRONIC LYMPHOCYTIC LEUKEMIA) (HCC): ICD-10-CM

## 2024-11-20 DIAGNOSIS — M25.531 PAIN OF BOTH WRIST JOINTS: Primary | ICD-10-CM

## 2024-11-20 DIAGNOSIS — F11.90 OPIOID USE: ICD-10-CM

## 2024-11-20 DIAGNOSIS — D80.1 HYPOGAMMAGLOBULINEMIA, ACQUIRED (HCC): ICD-10-CM

## 2024-11-20 PROCEDURE — 99214 OFFICE O/P EST MOD 30 MIN: CPT | Performed by: PHYSICIAN ASSISTANT

## 2024-11-20 PROCEDURE — G2211 COMPLEX E/M VISIT ADD ON: HCPCS | Performed by: PHYSICIAN ASSISTANT

## 2024-11-20 RX ORDER — OXYCODONE HYDROCHLORIDE 10 MG/1
10 TABLET ORAL EVERY 6 HOURS PRN
Qty: 90 TABLET | Refills: 0 | Status: SHIPPED | OUTPATIENT
Start: 2024-11-20

## 2024-11-20 RX ORDER — ACETAMINOPHEN 325 MG/1
650 TABLET ORAL ONCE
OUTPATIENT
Start: 2024-11-26

## 2024-11-20 RX ORDER — SODIUM CHLORIDE 9 MG/ML
20 INJECTION, SOLUTION INTRAVENOUS ONCE
OUTPATIENT
Start: 2024-11-26

## 2024-11-20 NOTE — PROGRESS NOTES
Hematology/Oncology Outpatient Follow-up  Cayetano Lucero 69 y.o. male 1955 [id number]    Date:  11/20/2024      Assessment and Plan:  1. CLL (chronic lymphocytic leukemia) ([facility])  Continue imbruvica 140 mg PO daily   Continue gazayva 1000 mg every month   CBC parameters are showing disease is under control     2. Hypogammaglobulinemia, acquired (HCC)  2/2 CLL   No recent infections   Continue IVIG 200 mg/kg every 1 month    3. Other autoimmune hemolytic anemia (HCC)  2/2 CLL  No evidence at this time   Continue PO steroid     4. Pain of both wrist joints (Primary)  - oxyCODONE (ROXICODONE) 10 MG TABS; Take 1 tablet (10 mg total) by mouth every 6 (six) hours as needed for moderate pain Max Daily Amount: 40 mg  Dispense: 90 tablet; Refill: 0    5. Opioid use  - naloxone (NARCAN) 4 mg/0.1 mL nasal spray; Administer 1 spray into a nostril. If no response after 2-3 minutes, give another dose in the other nostril using a new spray.  Dispense: 1 each; Refill: 1     HPI:  Oncology History   CLL (chronic lymphocytic leukemia) ([facility])   8/22/2017 -  Chemotherapy    chlorambucil (LEUKERAN), 0.5 mg/kg, Oral, Every 14 days, 6 of 6 cycles  obinutuzumab (GAZYVA) day 1 IVPB, 100 mg, Intravenous, Once, 1 of 1 cycle  obinutuzumab (GAZYVA) day 2 titrated infusion, 900 mg, Intravenous, Once, 1 of 1 cycle  obinutuzumab (GAZYVA) subsequent titrated infusion, 1,000 mg, Intravenous, Once, 74 of 80 cycles  Administration: 1,000 mg (5/21/2019), 1,000 mg (6/18/2019), 1,000 mg (7/16/2019), 1,000 mg (8/13/2019), 1,000 mg (9/10/2019), 1,000 mg (10/8/2019), 1,000 mg (11/5/2019), 1,000 mg (12/3/2019), 1,000 mg (12/31/2019), 1,000 mg (1/28/2020), 1,000 mg (2/25/2020), 1,000 mg (3/24/2020), 1,000 mg (4/21/2020), 1,000 mg (5/19/2020), 1,000 mg (6/16/2020), 1,000 mg (7/14/2020), 1,000 mg (8/11/2020), 1,000 mg (9/9/2020), 1,000 mg (10/6/2020), 1,000 mg (11/3/2020), 1,000 mg (12/1/2020), 1,000 mg (1/26/2021), 1,000 mg (12/29/2020), 1,000 mg  (2/23/2021), 1,000 mg (3/23/2021), 1,000 mg (4/20/2021), 1,000 mg (5/18/2021), 1,000 mg (6/15/2021), 1,000 mg (7/13/2021), 1,000 mg (8/10/2021), 1,000 mg (9/7/2021), 1,000 mg (10/5/2021), 1,000 mg (11/2/2021), 1,000 mg (11/30/2021), 1,000 mg (12/28/2021), 1,000 mg (1/25/2022), 1,000 mg (2/22/2022), 1,000 mg (3/22/2022), 1,000 mg (5/17/2022), 1,000 mg (6/14/2022), 1,000 mg (7/12/2022), 1,000 mg (8/9/2022), 1,000 mg (9/6/2022), 1,000 mg (10/4/2022), 1,000 mg (11/1/2022), 1,000 mg (11/29/2022), 1,000 mg (1/24/2023), 1,000 mg (2/21/2023), 1,000 mg (3/21/2023), 1,000 mg (4/18/2023), 1,000 mg (5/16/2023), 1,000 mg (6/13/2023), 1,000 mg (7/11/2023), 1,000 mg (8/8/2023), 1,000 mg (9/5/2023), 1,000 mg (10/3/2023), 1,000 mg (10/31/2023), 1,000 mg (11/28/2023), 1,000 mg (1/23/2024), 1,000 mg (2/20/2024), 1,000 mg (3/19/2024), 1,000 mg (4/16/2024), 1,000 mg (5/14/2024), 1,000 mg (7/9/2024), 1,000 mg (8/6/2024), 1,000 mg (9/3/2024), 1,000 mg (10/1/2024), 1,000 mg (10/29/2024)     4/24/2018 Initial Diagnosis    CLL (chronic lymphocytic leukemia) (HCC)       66-year-old male presents for follow-up regarding a longstanding history of CLL as well as hemolytic anemia related to his CLL.     On 3/20/17 patient found to have hemoglobin of 6.2, ,000. He was admitted to [facility] for blood transfusion and plan to transfer to [facility].   On 3/20/17 WBC count 195,000, hemoglobin 4.9, platelet count 65. Direct coomb's was positive with undetectable haptoglobin. He was given 2 units of PRBCs, Solu-Medrol 1 g ×3 days and IVIG 1 g/kg daily ×3 days. His hemoglobin continued to drop. Bone marrow biopsy on 3/21 showed marrow cellularity of greater than 95% almost replaced by small lymphocytes, lambda light chain restricted, CD20 positive, CD19 positive, CD23 positive partially expressing CD11c and CD25 and CD5 positive and negative for CD 79 and CD38.  He was treated with single dose of  chlorambucil to control his CLL.  3/22 second dose of chlorambucil, also Rituxan 375 mg/M ² autoimmune hemolytic anemia. WBC continued to rise, 266,000.  Patient initiated with Venetoclax 20 mg daily. Tumor lysis monitored every 8 hours; given aggressive hydration and Lasix and remained euvolemic.  3/24-WBC dropped to 112,000  3/25- WBC 63,000  3/26-WBC 15,000, , platelet count 17,000. Patient became febrile, 101.3. The cefepime initiated Venetoclax held.  3/28-patient fell from bed, CT head negative. ANC 1200, cefepime discontinued and Levaquin 75 mg daily started sliding 3/29 given second dose of rituximab 375 mg/m ²   3/30-WBC meg to 14.5 thousand, platelets 36,000, Venetoclax restarted 10 mg every other day  3/31 WBC 4.4, , platelet count 31,000.  4/1-4/4: WBC maintained less than 10,000, ANC and platelet count meg. He was discharged on Venetoclax 10 mg every other day. He was instructed to discontinue simvastatin, Ranexa, aspirin, metoprolol 50 mg q. day, losartan 50 mg q. day, Plavix 75 mg q. day, folic acid 800 µg b.i.d., prednisone 60 mg, allopurinol.  He was advised to continue Levaquin 750 mg daily for 10 days, Lexapro 10 mg daily, fenofibrate 50 mg daily, gabapentin 100 mg twice daily, Protonix 40 mg daily     Imaging studies performed at [facility]:  Patient had echocardiogram while inpatient at [facility] on 3/21. This study was unremarkable.  CT chest abdomen pelvis without contrast on 3/24 showed stable pulmonary nodules, patchy groundglass densities of lower lobes previously identified and which may represent volume loss or early infiltrate. Persistent diffuse bronchial wall thickening suggesting acute/chronic bronchitis changes. No bronchiectasis. No masses. Stable lymphadenopathy of mediastinum. Stable axillary lymphadenopathy. Splenomegaly 23.9 cm. Extensive lymphadenopathy throughout the entire retroperitoneal, small bowel mesentery, and pelvis. Largest lymph node in right lower  quadrant measured 4.6 x 4.8. Stable enlarged left para-aortic lymph node measuring 6.2 x 6.8.  4/12/17: Patient received one dose of Rituxan on 4/7/17. Venetoclax 10 mg every other day 4/5/17 - 4/9/17. Venetoclax 10 mg every day beginning 4/10/17. Monitoring CBC 3 times per week.   4/28/17: patient had follow-up appointment at [facility] with Dr. Thibodeaux. She recommended to slowly increase dose of veneteclax. Also, she recommended as he has had numerous treatments with Rituxan, if antibody directed therapy becomes indicated in the future recommendation was with Gazyvtammi. She will be seeing her on a p.r.n. Basis.     July 2017- Hemolysis. Disease not under control with veneteclax. Started prednisone 60 mg daily, folic acid, IBRUTINIB 420 mg daily and Gazyva.      Sept 2017- 9/18-9/20 patient was admitted due to uncontrolled hyperglycemia with new onset DM type II due to chronic prednisone use for CLL/hemolytic anemia; also found to have profound neutropenia. Ibrutinib dose was reduced to 280 mg daily      October 2017- 10/7/17 - 10/14/17 patient was admitted due to cellulitis on his scalp     Dec 2017- Imbruvica decreased to 140 mg PO daily due to thrombocytopenia      4/23 - 4/27 patient was admitted due to listeria bacteremia. He was discharged on IV ampicillin. Echo negative for vegetations. Imbruvica and Gazyva was on hold at that time.   Repeat CBC on 5/14/18 showed normal ANC, and hemoglobin. Platelets adequate at 83K.     10/18/18 the patient EGD.  This showed distal esophageal stricture.  This was dilated to 18 mm using a balloon dilator.  He could not dilate any further due to bleeding secondary to thrombocytopenia     He had Evusheld on 2/17/22, scheduled for 2nd dose in Aug 2022.    Interval history:    ROS: Review of Systems   Constitutional:  Positive for fatigue. Negative for activity change, appetite change, chills, fever and unexpected weight change.   Respiratory:  Negative for cough and  shortness of breath.    Cardiovascular:  Negative for chest pain, palpitations and leg swelling.   Gastrointestinal:  Negative for abdominal pain, constipation, diarrhea, nausea and vomiting.   Genitourinary:  Negative for difficulty urinating, dysuria and hematuria.   Musculoskeletal:  Positive for arthralgias.   Skin: Negative.    Neurological:  Negative for dizziness, weakness, light-headedness, numbness and headaches.        Peripheral neuropathy    Hematological: Negative.    Psychiatric/Behavioral: Negative.       Past Medical History:   Diagnosis Date    Allergic     Anemia     Arthritis     CAD (coronary artery disease) 2004    Cancer (HCC)     Leukemia    Cellulitis of scalp     CKD (chronic kidney disease) stage 3, GFR 30-59 ml/min (HCC)     CLL (chronic lymphocytic leukemia) (HCC)     COPD (chronic obstructive pulmonary disease) (HCC)     Diabetes mellitus (HCC)     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    Dyslipidemia     Edema extremities     Esophageal ulcer     H/O blood clots     Hemolytic anemia (HCC)     Hiatal hernia     History of TIA (transient ischemic attack)     Hyperlipidemia     Hypertension     Listeria infection 2018    Multiple gastric ulcers     Myocardial infarction (HCC) 2004    acute    Neutropenia (HCC)     Obese     Recurrent sinusitis     Thrombocytopenia (HCC)     Vertigo        Past Surgical History:   Procedure Laterality Date    ARTHROSCOPIC REPAIR ACL      lt knee    CARDIAC SURGERY      cardiac cath with stent    FOOT SURGERY      IR PORT CHECK  5/17/2019    KNEE ARTHROSCOPY      OTHER SURGICAL HISTORY      cardiac cath lesion 1, 1st adjunct treat device: stent    PORTACATH PLACEMENT      VA ESOPHAGOGASTRODUODENOSCOPY TRANSORAL DIAGNOSTIC N/A 10/5/2017    Procedure: ESOPHAGOGASTRODUODENOSCOPY (EGD) with bx;  Surgeon: Winifred Hansen DO;  Location: [facility];  Service: Gastroenterology    VA ESOPHAGOGASTRODUODENOSCOPY TRANSORAL DIAGNOSTIC N/A 3/8/2018    Procedure: ESOPHAGOGASTRODUODENOSCOPY  (EGD) with biopsy;  Surgeon: Winifred Hansen DO;  Location: [facility];  Service: Gastroenterology    VT ESOPHAGOGASTRODUODENOSCOPY TRANSORAL DIAGNOSTIC N/A 2018    Procedure: ESOPHAGOGASTRODUODENOSCOPY (EGD) with Dilation;  Surgeon: Winifred Hansen DO;  Location: [facility];  Service: Gastroenterology    VT ESOPHAGOGASTRODUODENOSCOPY TRANSORAL DIAGNOSTIC N/A 10/18/2018    Procedure: ESOPHAGOGASTRODUODENOSCOPY (EGD) with dilation;  Surgeon: Edu Mejía MD;  Location: [facility];  Service: Gastroenterology    VT ESOPHAGOGASTRODUODENOSCOPY TRANSORAL DIAGNOSTIC N/A 2024    Procedure: ESOPHAGOGASTRODUODENOSCOPY (EGD);  Surgeon: Immanuel Gonsales MD;  Location: [facility];  Service: Thoracic    VT ESOPHAGOSCOPY FLEXIBLE TRANSORAL WITH BIOPSY N/A 2016    Procedure: ESOPHAGOGASTRODUODENOSCOPY (EGD); ESOPHAGEAL DILATION; ESOPHAGEAL BIOPSY;  Surgeon: Abdi Holcomb MD;  Location: [facility];  Service: Thoracic    VT LAPS RPR PARAESPHGL HRNA INCL FUNDPLSTY W/O MESH N/A 2024    Procedure: REPAIR HERNIA PARAESOPHAGEAL LAPAROSCOPIC W ROBOTICS TYPE 3. PARTIAL FUNDOPLICATION. MESH.;  Surgeon: Immanuel Gonsales MD;  Location: [facility];  Service: Thoracic    TONSILLECTOMY      UPPER GASTROINTESTINAL ENDOSCOPY      x 6       Social History     Socioeconomic History    Marital status: /Civil Union     Spouse name: None    Number of children: None    Years of education: None    Highest education level: None   Occupational History    Occupation: [place] [employer]    Occupation: retired    Tobacco Use    Smoking status: Former     Current packs/day: 0.00     Average packs/day: 2.0 packs/day for 33.0 years (66.0 ttl pk-yrs)     Types: Cigarettes     Start date:      Quit date: 2011     Years since quittin.8     Passive exposure: Past    Smokeless tobacco: Never   Vaping Use    Vaping status: Never Used   Substance and Sexual Activity    Alcohol use: Yes     Alcohol/week: 2.0 standard drinks of alcohol      Types: 2 Cans of beer per week     Comment: social use as per [employer]    Drug use: Never    Sexual activity: None   Other Topics Concern    None   Social History Narrative    None     Social Drivers of Health     Financial Resource Strain: Low Risk  (4/4/2024)    Overall Financial Resource Strain (CARDIA)     Difficulty of Paying Living Expenses: Not hard at all   Food Insecurity: Food Insecurity Present (4/4/2024)    Nursing - Inadequate Food Risk Classification     Worried About Running Out of Food in the Last Year: Sometimes true     Ran Out of Food in the Last Year: Sometimes true     Ran Out of Food in the Last Year: Not on file   Transportation Needs: No Transportation Needs (4/4/2024)    PRAPARE - Transportation     Lack of Transportation (Medical): No     Lack of Transportation (Non-Medical): No   Physical Activity: Not on file   Stress: Not on file   Social Connections: Not on file   Intimate Partner Violence: Not on file   Housing Stability: Low Risk  (4/4/2024)    Housing Stability Vital Sign     Unable to Pay for Housing in the Last Year: No     Number of Times Moved in the Last Year: 1     Homeless in the Last Year: No       Family History   Problem Relation Age of Onset    Leukemia Mother         chronic    Colon polyps Mother         sidmoid    Parkinsonism Father        Allergies   Allergen Reactions    Heparin Other (See Comments)     Other reaction(s): Blood Disorders  clot    Macrolides And Ketolides Other (See Comments)     Interacts with other meds he is taking    Simvastatin Myalgia         Current Outpatient Medications:     albuterol (2.5 mg/3 mL) 0.083 % nebulizer solution, Take 3 mL (2.5 mg total) by nebulization every 6 (six) hours as needed for wheezing or shortness of breath, Disp: 180 mL, Rfl: 5    albuterol (Ventolin HFA) 90 mcg/act inhaler, USE 2 INHALATIONS EVERY 6 HOURS AS NEEDED FOR WHEEZING, Disp: 54 g, Rfl: 5    amLODIPine (NORVASC) 10 mg tablet, TAKE 1 TABLET DAILY, Disp:  90 tablet, Rfl: 1    aspirin 81 MG tablet, Take 81 mg by mouth daily Every other day, Disp: , Rfl:     benzonatate (TESSALON) 200 MG capsule, TAKE 1 CAPSULE THREE TIMES A DAY AS NEEDED FOR COUGH, Disp: 20 capsule, Rfl: 5    Blood Glucose Monitoring Suppl (FreeStyle Freedom Lite) w/Device KIT, Use 3 (three) times a day, Disp: 1 kit, Rfl: 0    citalopram (CeleXA) 40 mg tablet, TAKE 1 TABLET DAILY, Disp: 30 tablet, Rfl: 5    Continuous Blood Gluc  (FreeStyle Ashley 2 Corder) JOE, Use to scan sensor premeals and at bedtime, Disp: 1 each, Rfl: 1    Continuous Glucose Sensor (FreeStyle Ashley 2 Sensor) MISC, Apply 1 sensor every 14 days. Use as directed with Freestyle Ashley 2 reader., Disp: 6 each, Rfl: 2    Diclofenac Sodium (VOLTAREN) 1 %, Apply 4 g topically 4 (four) times a day, Disp: 100 g, Rfl: 0    fenofibrate (TRICOR) 145 mg tablet, TAKE 1 TABLET DAILY, Disp: 60 tablet, Rfl: 5    folic acid (FOLVITE) 1 mg tablet, Take 800 mcg by mouth daily , Disp: , Rfl:     gabapentin (NEURONTIN) 600 MG tablet, TAKE 1 TABLET DAILY, Disp: 90 tablet, Rfl: 5    glucose blood (FREESTYLE LITE) test strip, Check blood sugar 3 times a day, Disp: 300 strip, Rfl: 1    Ibrutinib 140 MG TABS, Take 140 mg by mouth daily, Disp: 28 tablet, Rfl: 11    Injection Device for Insulin (CeQur Simplicity 2U) JOE, Use 1 patch every 3 days, disp 1 box of 10 patches for 30 days supply, Disp: 1 each, Rfl: 3    Injection Device for Insulin (CeQur Simplicity ) MISC, Use to insert simplicity device., Disp: 1 each, Rfl: 1    insulin degludec (Tresiba FlexTouch) 100 units/mL injection pen, Inject 18 Units under the skin daily at bedtime, Disp: 15 mL, Rfl: 3    insulin lispro (Admelog SoloStar) 100 units/mL injection pen, 12-14 units before meals, Disp: 45 mL, Rfl: 1    Insulin Pen Needle (BD Pen Needle Inez U/F) 32G X 4 MM MISC, Use 3 (three) times a day, Disp: 300 each, Rfl: 0    labetalol (NORMODYNE) 100 mg tablet, Take 1 tablet (100 mg  total) by mouth 2 (two) times a day (Patient taking differently: Take 100 mg by mouth 2 (two) times a day Only taking once a day because if he takes it twice a day his arms and hands shake), Disp: 180 tablet, Rfl: 3    Lancets (FREESTYLE) lancets, Use as instructed--test 2 times a day  E 11.9, Disp: 100 each, Rfl: 0    Lancets (freestyle) lancets, TEST BLOOD SUGAR 3 TIMES A DAY, Disp: 300 each, Rfl: 1    LORazepam (ATIVAN) 0.5 mg tablet, Take 1 tablet (0.5 mg total) by mouth daily as needed for anxiety, Disp: 30 tablet, Rfl: 0    losartan (COZAAR) 100 MG tablet, TAKE 1 TABLET DAILY, Disp: 60 tablet, Rfl: 5    mometasone-formoterol (Dulera) 200-5 MCG/ACT inhaler, Inhale 2 puffs 2 (two) times a day Rinse mouth after use., Disp: 13 g, Rfl: 1    multivitamin (THERAGRAN) TABS, Take 1 tablet by mouth daily, Disp: , Rfl:     naloxone (NARCAN) 4 mg/0.1 mL nasal spray, Administer 1 spray into a nostril. If no response after 2-3 minutes, give another dose in the other nostril using a new spray., Disp: 1 each, Rfl: 1    obinutuzumab (GAZYVA) 1000 mg/40 mL SOLN, Infuse into a venous catheter, Disp: , Rfl:     oxyCODONE (ROXICODONE) 10 MG TABS, Take 1 tablet (10 mg total) by mouth every 6 (six) hours as needed for moderate pain Max Daily Amount: 40 mg, Disp: 90 tablet, Rfl: 0    posaconazole (NOXAFIL) 40 mg/mL suspension, Take by mouth daily, Disp: , Rfl:     predniSONE 5 mg tablet, Take 1 tablet (5 mg total) by mouth daily, Disp: 90 tablet, Rfl: 0    Vonoprazan Fumarate 20 MG TABS, Take 20 mg by mouth in the morning, Disp: 30 tablet, Rfl: 5    acyclovir (ZOVIRAX) 400 MG tablet, TAKE 1 TABLET TWICE A DAY, Disp: 60 tablet, Rfl: 11    insulin lispro (Admelog) 100 units/mL injection, Use up to 65 units per day as directed via [employer] device. (Patient not taking: Reported on 11/20/2024), Disp: 20 mL, Rfl: 3    Current Facility-Administered Medications:     bupivacaine (MARCAINE) 0.25 % injection 1 mL, 1 mL, Infiltration, ,  , 1 mL at 09/09/24 1430    lidocaine (XYLOCAINE) 1 % injection 1 mL, 1 mL, Infiltration, , , 1 mL at 09/09/24 1430    triamcinolone acetonide (KENALOG-40) 40 mg/mL injection 20 mg, 20 mg, Infiltration, , , 20 mg at 09/09/24 1430      Physical Exam:  /74 (BP Location: Right arm, Patient Position: Sitting, Cuff Size: Adult)   Pulse 72   Temp 97.5 °F (36.4 °C) (Temporal)   Resp 18   Ht 6' (1.829 m)   Wt 101 kg (222 lb)   SpO2 99%   BMI 30.11 kg/m²     Physical Exam  Vitals reviewed.   Constitutional:       General: He is not in acute distress.     Appearance: He is well-developed. He is not ill-appearing.   HENT:      Head: Normocephalic and atraumatic.   Eyes:      General: No scleral icterus.     Conjunctiva/sclera: Conjunctivae normal.   Cardiovascular:      Rate and Rhythm: Normal rate and regular rhythm.      Heart sounds: Normal heart sounds. No murmur heard.  Pulmonary:      Effort: Pulmonary effort is normal. No respiratory distress.      Breath sounds: Normal breath sounds.   Abdominal:      Palpations: Abdomen is soft.      Tenderness: There is no abdominal tenderness.   Musculoskeletal:         General: No tenderness. Normal range of motion.      Cervical back: Normal range of motion and neck supple.      Right lower leg: No edema.      Left lower leg: No edema.   Lymphadenopathy:      Cervical: No cervical adenopathy.   Skin:     General: Skin is warm and dry.   Neurological:      Mental Status: He is alert and oriented to person, place, and time.      Cranial Nerves: No cranial nerve deficit.   Psychiatric:         Mood and Affect: Mood normal.         Behavior: Behavior normal.       Labs:  Lab Results   Component Value Date    WBC 7.23 10/29/2024    HGB 14.4 10/29/2024    HCT 43.7 10/29/2024    MCV 95 10/29/2024     10/29/2024     I have spent 30 minutes with Patient  today in which greater than 50% of this time was spent in counseling/coordination of care regarding Diagnostic  results, Risks and benefits of tx options, Instructions for management, Patient and family education, Impressions, Documenting in the medical record, Reviewing / ordering tests, medicine, procedures  , and Obtaining or reviewing history  .    Patient voiced understanding and agreement in the above discussion. Aware to contact our office with questions/symptoms in the interim.     This note has been generated by voice recognition software system.  Therefore, there may be spelling, grammar, and or syntax errors. Please contact if questions arise.

## 2024-11-26 ENCOUNTER — HOSPITAL ENCOUNTER (OUTPATIENT)
Dept: INFUSION CENTER | Facility: CLINIC | Age: 69
Discharge: HOME | End: 2024-11-26
Payer: MEDICARE

## 2024-11-26 VITALS
TEMPERATURE: 97.1 F | RESPIRATION RATE: 16 BRPM | SYSTOLIC BLOOD PRESSURE: 173 MMHG | DIASTOLIC BLOOD PRESSURE: 85 MMHG | HEART RATE: 72 BPM

## 2024-11-26 DIAGNOSIS — C91.10 CLL (CHRONIC LYMPHOCYTIC LEUKEMIA) (HCC): Primary | ICD-10-CM

## 2024-11-26 DIAGNOSIS — R76.8 LOW SERUM IGG FOR AGE: ICD-10-CM

## 2024-11-26 LAB
ALBUMIN SERPL BCG-MCNC: 4.1 G/DL (ref 3.5–5)
ALP SERPL-CCNC: 38 U/L (ref 34–104)
ALT SERPL W P-5'-P-CCNC: 25 U/L (ref 7–52)
ANION GAP SERPL CALCULATED.3IONS-SCNC: 5 MMOL/L (ref 4–13)
AST SERPL W P-5'-P-CCNC: 24 U/L (ref 13–39)
BASOPHILS # BLD AUTO: 0.07 THOUSANDS/ΜL (ref 0–0.1)
BASOPHILS NFR BLD AUTO: 1 % (ref 0–1)
BILIRUB SERPL-MCNC: 0.54 MG/DL (ref 0.2–1)
BUN SERPL-MCNC: 17 MG/DL (ref 5–25)
CALCIUM SERPL-MCNC: 9.4 MG/DL (ref 8.4–10.2)
CHLORIDE SERPL-SCNC: 107 MMOL/L (ref 96–108)
CO2 SERPL-SCNC: 27 MMOL/L (ref 21–32)
CREAT SERPL-MCNC: 0.98 MG/DL (ref 0.6–1.3)
EOSINOPHIL # BLD AUTO: 0.45 THOUSAND/ΜL (ref 0–0.61)
EOSINOPHIL NFR BLD AUTO: 7 % (ref 0–6)
ERYTHROCYTE [DISTWIDTH] IN BLOOD BY AUTOMATED COUNT: 13.8 % (ref 11.6–15.1)
GFR SERPL CREATININE-BSD FRML MDRD: 77 ML/MIN/1.73SQ M
GLUCOSE SERPL-MCNC: 131 MG/DL (ref 65–140)
HCT VFR BLD AUTO: 42 % (ref 36.5–49.3)
HGB BLD-MCNC: 13.8 G/DL (ref 12–17)
IGG SERPL-MCNC: 494 MG/DL (ref 635–1741)
IMM GRANULOCYTES # BLD AUTO: 0.47 THOUSAND/UL (ref 0–0.2)
IMM GRANULOCYTES NFR BLD AUTO: 8 % (ref 0–2)
LYMPHOCYTES # BLD AUTO: 0.85 THOUSANDS/ΜL (ref 0.6–4.47)
LYMPHOCYTES NFR BLD AUTO: 14 % (ref 14–44)
MCH RBC QN AUTO: 30.5 PG (ref 26.8–34.3)
MCHC RBC AUTO-ENTMCNC: 32.9 G/DL (ref 31.4–37.4)
MCV RBC AUTO: 93 FL (ref 82–98)
MONOCYTES # BLD AUTO: 0.95 THOUSAND/ΜL (ref 0.17–1.22)
MONOCYTES NFR BLD AUTO: 15 % (ref 4–12)
NEUTROPHILS # BLD AUTO: 3.41 THOUSANDS/ΜL (ref 1.85–7.62)
NEUTS SEG NFR BLD AUTO: 55 % (ref 43–75)
NRBC BLD AUTO-RTO: 0 /100 WBCS
PLATELET # BLD AUTO: 170 THOUSANDS/UL (ref 149–390)
PMV BLD AUTO: 11.4 FL (ref 8.9–12.7)
POTASSIUM SERPL-SCNC: 4.1 MMOL/L (ref 3.5–5.3)
PROT SERPL-MCNC: 6.2 G/DL (ref 6.4–8.4)
RBC # BLD AUTO: 4.53 MILLION/UL (ref 3.88–5.62)
RETICS # AUTO: ABNORMAL 10*3/UL (ref 14356–105094)
RETICS # CALC: 1.9 % (ref 0.37–1.87)
SODIUM SERPL-SCNC: 139 MMOL/L (ref 135–147)
WBC # BLD AUTO: 6.2 THOUSAND/UL (ref 4.31–10.16)

## 2024-11-26 PROCEDURE — 96415 CHEMO IV INFUSION ADDL HR: CPT

## 2024-11-26 PROCEDURE — 80053 COMPREHEN METABOLIC PANEL: CPT

## 2024-11-26 PROCEDURE — 96413 CHEMO IV INFUSION 1 HR: CPT

## 2024-11-26 PROCEDURE — 96367 TX/PROPH/DG ADDL SEQ IV INF: CPT

## 2024-11-26 PROCEDURE — 85045 AUTOMATED RETICULOCYTE COUNT: CPT | Performed by: PHYSICIAN ASSISTANT

## 2024-11-26 PROCEDURE — 82784 ASSAY IGA/IGD/IGG/IGM EACH: CPT

## 2024-11-26 PROCEDURE — 85025 COMPLETE CBC W/AUTO DIFF WBC: CPT

## 2024-11-26 RX ORDER — SODIUM CHLORIDE 9 MG/ML
20 INJECTION, SOLUTION INTRAVENOUS ONCE
Status: COMPLETED | OUTPATIENT
Start: 2024-11-26 | End: 2024-11-26

## 2024-11-26 RX ORDER — ACETAMINOPHEN 325 MG/1
650 TABLET ORAL ONCE
Status: COMPLETED | OUTPATIENT
Start: 2024-11-26 | End: 2024-11-26

## 2024-11-26 RX ADMIN — DIPHENHYDRAMINE HYDROCHLORIDE 25 MG: 50 INJECTION, SOLUTION INTRAMUSCULAR; INTRAVENOUS at 08:55

## 2024-11-26 RX ADMIN — SODIUM CHLORIDE 20 ML/HR: 0.9 INJECTION, SOLUTION INTRAVENOUS at 08:32

## 2024-11-26 RX ADMIN — OBINUTUZUMAB 1000 MG: 1000 INJECTION, SOLUTION, CONCENTRATE INTRAVENOUS at 09:57

## 2024-11-26 RX ADMIN — DEXAMETHASONE SODIUM PHOSPHATE 20 MG: 10 INJECTION, SOLUTION INTRAMUSCULAR; INTRAVENOUS at 08:33

## 2024-11-26 RX ADMIN — ACETAMINOPHEN 650 MG: 325 TABLET ORAL at 08:32

## 2024-11-26 NOTE — PROGRESS NOTES
Pt. Tolerated Gazyva without adverse event.   Future appointment reviewed for 12/10/24 for IVIG.  Pt. Will return for Gazyva on 1224/24 at 0800. AVS declined.

## 2024-11-26 NOTE — PLAN OF CARE
Problem: INFECTION - ADULT  Goal: Absence or prevention of progression during hospitalization  Description: INTERVENTIONS:  - Assess and monitor for signs and symptoms of infection  - Monitor lab/diagnostic results  - Monitor all insertion sites, i.e. indwelling lines, tubes, and drains  - Monitor endotracheal if appropriate and nasal secretions for changes in amount and color  - [place] appropriate cooling/warming therapies per order  - Administer medications as ordered  - Instruct and encourage patient and family to use good hand hygiene technique  - Identify and instruct in appropriate isolation precautions for identified infection/condition  11/26/2024 0845 by Lian Dominguez RN  Outcome: Progressing  11/26/2024 0813 by Lian Dominguez RN  Outcome: Progressing  Goal: Absence of fever/infection during neutropenic period  Description: INTERVENTIONS:  - Monitor WBC    11/26/2024 0845 by Lian Dominguez RN  Outcome: Progressing  11/26/2024 0813 by Lian Dominguez RN  Outcome: Progressing     Problem: Knowledge Deficit  Goal: Patient/family/caregiver demonstrates understanding of disease process, treatment plan, medications, and discharge instructions  Description: Complete learning assessment and assess knowledge base.  Interventions:  - Provide teaching at level of understanding  - Provide teaching via preferred learning methods  11/26/2024 0845 by Lian Dominguez RN  Outcome: Progressing  11/26/2024 0813 by Lian Dominguez RN  Outcome: Progressing

## 2024-11-26 NOTE — PLAN OF CARE
Problem: INFECTION - ADULT  Goal: Absence or prevention of progression during hospitalization  Description: INTERVENTIONS:  - Assess and monitor for signs and symptoms of infection  - Monitor lab/diagnostic results  - Monitor all insertion sites, i.e. indwelling lines, tubes, and drains  - Monitor endotracheal if appropriate and nasal secretions for changes in amount and color  - [place] appropriate cooling/warming therapies per order  - Administer medications as ordered  - Instruct and encourage patient and family to use good hand hygiene technique  - Identify and instruct in appropriate isolation precautions for identified infection/condition  Outcome: Progressing  Goal: Absence of fever/infection during neutropenic period  Description: INTERVENTIONS:  - Monitor WBC    Outcome: Progressing     Problem: Knowledge Deficit  Goal: Patient/family/caregiver demonstrates understanding of disease process, treatment plan, medications, and discharge instructions  Description: Complete learning assessment and assess knowledge base.  Interventions:  - Provide teaching at level of understanding  - Provide teaching via preferred learning methods  Outcome: Progressing

## 2024-12-04 ENCOUNTER — OFFICE VISIT (OUTPATIENT)
Dept: CARDIAC SURGERY | Facility: CLINIC | Age: 69
End: 2024-12-04
Payer: MEDICARE

## 2024-12-04 VITALS
TEMPERATURE: 97.7 F | BODY MASS INDEX: 30.58 KG/M2 | WEIGHT: 225.75 LBS | SYSTOLIC BLOOD PRESSURE: 138 MMHG | HEIGHT: 72 IN | DIASTOLIC BLOOD PRESSURE: 84 MMHG | OXYGEN SATURATION: 98 % | RESPIRATION RATE: 15 BRPM | HEART RATE: 73 BPM

## 2024-12-04 DIAGNOSIS — R13.19 ESOPHAGEAL DYSPHAGIA: Primary | ICD-10-CM

## 2024-12-04 PROCEDURE — 99213 OFFICE O/P EST LOW 20 MIN: CPT | Performed by: THORACIC SURGERY (CARDIOTHORACIC VASCULAR SURGERY)

## 2024-12-04 NOTE — ASSESSMENT & PLAN NOTE
69-year-old male with history of a large type III hernia and significant dysphagia status post 6/7/2024 robotic type III paraesophageal hernia repair and toupee partial fundoplication doing very well.    Cayetano is doing excellent 6 months out from surgery.  He has no dysphagia and minimal heartburn off of all medications.  He has no abdominal discomfort and no other complaints at this time.  He is very satisfied with his result.  I am as well.    I again voice it is reasonable for him to be off of all acid suppression medication at this time.  This should be his new baseline.  Should he getting worsening reflux or dysphagia I would like to hear from him.  Otherwise no follow-up necessary.  He will call us with any questions or concerns.    Immanuel Gonsales            22-Oct-2024 14:39

## 2024-12-04 NOTE — PROGRESS NOTES
Name: Cayetano Lucero      : 1955      MRN: [id number]  Encounter Provider: Immanuel Gonsales MD  Encounter Date: 2024   Encounter department: [facility] [place]     Cancer Staging   No matching staging information was found for the patient.  :  Assessment & Plan  Esophageal dysphagia  69-year-old male with history of a large type III hernia and significant dysphagia status post 2024 robotic type III paraesophageal hernia repair and toupee partial fundoplication doing very well.    Cayetano is doing excellent 6 months out from surgery.  He has no dysphagia and minimal heartburn off of all medications.  He has no abdominal discomfort and no other complaints at this time.  He is very satisfied with his result.  I am as well.    I again voice it is reasonable for him to be off of all acid suppression medication at this time.  This should be his new baseline.  Should he getting worsening reflux or dysphagia I would like to hear from him.  Otherwise no follow-up necessary.  He will call us with any questions or concerns.    Immanuel Gonsales               Thoracic History   Oncology History   CLL (chronic lymphocytic leukemia) (HCC)   2017 -  Chemotherapy    chlorambucil (LEUKERAN), 0.5 mg/kg, Oral, Every 14 days, 6 of 6 cycles  obinutuzumab (GAZYVA) day 1 IVPB, 100 mg, Intravenous, Once, 1 of 1 cycle  obinutuzumab (GAZYVA) day 2 titrated infusion, 900 mg, Intravenous, Once, 1 of 1 cycle  obinutuzumab (GAZYVA) subsequent titrated infusion, 1,000 mg, Intravenous, Once, 75 of 80 cycles  Administration: 1,000 mg (2019), 1,000 mg (2019), 1,000 mg (2019), 1,000 mg (2019), 1,000 mg (9/10/2019), 1,000 mg (10/8/2019), 1,000 mg (2019), 1,000 mg (12/3/2019), 1,000 mg (2019), 1,000 mg (2020), 1,000 mg (2020), 1,000 mg (3/24/2020), 1,000 mg (2020), 1,000 mg (2020), 1,000 mg (2020), 1,000 mg (2020), 1,000 mg (2020),  1,000 mg (9/9/2020), 1,000 mg (10/6/2020), 1,000 mg (11/3/2020), 1,000 mg (12/1/2020), 1,000 mg (1/26/2021), 1,000 mg (12/29/2020), 1,000 mg (2/23/2021), 1,000 mg (3/23/2021), 1,000 mg (4/20/2021), 1,000 mg (5/18/2021), 1,000 mg (6/15/2021), 1,000 mg (7/13/2021), 1,000 mg (8/10/2021), 1,000 mg (9/7/2021), 1,000 mg (10/5/2021), 1,000 mg (11/2/2021), 1,000 mg (11/30/2021), 1,000 mg (12/28/2021), 1,000 mg (1/25/2022), 1,000 mg (2/22/2022), 1,000 mg (3/22/2022), 1,000 mg (5/17/2022), 1,000 mg (6/14/2022), 1,000 mg (7/12/2022), 1,000 mg (8/9/2022), 1,000 mg (9/6/2022), 1,000 mg (10/4/2022), 1,000 mg (11/1/2022), 1,000 mg (11/29/2022), 1,000 mg (1/24/2023), 1,000 mg (2/21/2023), 1,000 mg (3/21/2023), 1,000 mg (4/18/2023), 1,000 mg (5/16/2023), 1,000 mg (6/13/2023), 1,000 mg (7/11/2023), 1,000 mg (8/8/2023), 1,000 mg (9/5/2023), 1,000 mg (10/3/2023), 1,000 mg (10/31/2023), 1,000 mg (11/28/2023), 1,000 mg (1/23/2024), 1,000 mg (2/20/2024), 1,000 mg (3/19/2024), 1,000 mg (4/16/2024), 1,000 mg (5/14/2024), 1,000 mg (7/9/2024), 1,000 mg (11/26/2024), 1,000 mg (8/6/2024), 1,000 mg (9/3/2024), 1,000 mg (10/1/2024), 1,000 mg (10/29/2024)     4/24/2018 Initial Diagnosis    CLL (chronic lymphocytic leukemia) (HCC)        History of Present Illness   HPI  Cayetano Lucero is a 69 y.o. male who comes back 6 months out from his robotic paraesophageal hernia repair and toupee fundoplication.  Overall he is doing very well at this time.  He denies any dysphagia whatsoever.  He has minimal heartburn twice over the past 6 months.  He is off of all acid medication.  He did have some abdominal incisional tenderness but this is improved.  Otherwise no other complaints.    He filled out our GERD HRQL questionnaire today and he is satisfied with his current condition.  Total score 8.  His preoperative score significant at 44.    Review of Systems   Constitutional:  Negative for activity change, appetite change, chills, diaphoresis, fatigue,  fever and unexpected weight change.   HENT: Negative.     Eyes: Negative.    Respiratory:  Negative for apnea, cough, chest tightness, shortness of breath, wheezing and stridor.    Cardiovascular:  Negative for chest pain, palpitations and leg swelling.   Gastrointestinal:  Negative for abdominal distention, abdominal pain, blood in stool, constipation, diarrhea, nausea and vomiting.   Endocrine: Negative.    Genitourinary: Negative.    Musculoskeletal: Negative.    Skin: Negative.    Allergic/Immunologic: Negative.    Neurological: Negative.    Hematological: Negative.    Psychiatric/Behavioral: Negative.             Objective   /84 (BP Location: Left arm, Patient Position: Sitting, Cuff Size: Standard)   Pulse 73   Temp 97.7 °F (36.5 °C) (Temporal)   Resp 15   Ht 6' (1.829 m)   Wt 102 kg (225 lb 12 oz)   SpO2 98%   BMI 30.62 kg/m²     Blood pressure 138/84, pulse 73, temperature 97.7 °F (36.5 °C), temperature source Temporal, resp. rate 15, height 6' (1.829 m), weight 102 kg (225 lb 12 oz), SpO2 98%.  ECOG    Physical Exam  Vitals and nursing note reviewed.   Constitutional:       General: He is not in acute distress.     Appearance: He is well-developed. He is not diaphoretic.   HENT:      Head: Normocephalic and atraumatic.      Mouth/Throat:      Pharynx: No oropharyngeal exudate.   Eyes:      General: No scleral icterus.     Conjunctiva/sclera: Conjunctivae normal.      Pupils: Pupils are equal, round, and reactive to light.   Neck:      Thyroid: No thyromegaly.      Vascular: No JVD.      Trachea: No tracheal deviation.   Cardiovascular:      Rate and Rhythm: Normal rate and regular rhythm.      Heart sounds: Normal heart sounds. No murmur heard.     No friction rub. No gallop.   Pulmonary:      Effort: Pulmonary effort is normal. No respiratory distress.      Breath sounds: Normal breath sounds. No wheezing or rales.   Chest:      Chest wall: No tenderness.   Abdominal:      General: Bowel  sounds are normal. There is no distension.      Palpations: Abdomen is soft. There is no mass.      Tenderness: There is no abdominal tenderness. There is no guarding or rebound.      Comments: Robotic abdominal incisions x 6 are well-healed.  No erythema.     Musculoskeletal:         General: No tenderness or deformity. Normal range of motion.      Cervical back: Normal range of motion and neck supple.   Skin:     General: Skin is warm and dry.      Coloration: Skin is not pale.      Findings: No erythema or rash.   Neurological:      Mental Status: He is alert and oriented to person, place, and time.   Psychiatric:         Behavior: Behavior normal.         Thought Content: Thought content normal.         Judgment: Judgment normal.

## 2024-12-10 ENCOUNTER — HOSPITAL ENCOUNTER (OUTPATIENT)
Dept: INFUSION CENTER | Facility: CLINIC | Age: 69
Discharge: HOME | End: 2024-12-10
Payer: MEDICARE

## 2024-12-10 ENCOUNTER — PATIENT OUTREACH (OUTPATIENT)
Dept: HEMATOLOGY ONCOLOGY | Facility: CLINIC | Age: 69
End: 2024-12-10

## 2024-12-10 ENCOUNTER — TELEPHONE (OUTPATIENT)
Age: 69
End: 2024-12-10

## 2024-12-10 VITALS
RESPIRATION RATE: 18 BRPM | HEART RATE: 80 BPM | DIASTOLIC BLOOD PRESSURE: 88 MMHG | SYSTOLIC BLOOD PRESSURE: 131 MMHG | TEMPERATURE: 96.7 F | BODY MASS INDEX: 29.57 KG/M2 | WEIGHT: 218 LBS

## 2024-12-10 DIAGNOSIS — C91.10 CLL (CHRONIC LYMPHOCYTIC LEUKEMIA) (HCC): ICD-10-CM

## 2024-12-10 DIAGNOSIS — D80.1 HYPOGAMMAGLOBULINEMIA, ACQUIRED (HCC): ICD-10-CM

## 2024-12-10 DIAGNOSIS — R76.8 LOW SERUM IGG FOR AGE: Primary | ICD-10-CM

## 2024-12-10 PROCEDURE — 96365 THER/PROPH/DIAG IV INF INIT: CPT

## 2024-12-10 PROCEDURE — 96367 TX/PROPH/DG ADDL SEQ IV INF: CPT

## 2024-12-10 PROCEDURE — 96366 THER/PROPH/DIAG IV INF ADDON: CPT

## 2024-12-10 RX ORDER — ACETAMINOPHEN 325 MG/1
650 TABLET ORAL ONCE
Status: COMPLETED | OUTPATIENT
Start: 2024-12-10 | End: 2024-12-10

## 2024-12-10 RX ORDER — ACETAMINOPHEN 325 MG/1
650 TABLET ORAL ONCE
OUTPATIENT
Start: 2025-01-07

## 2024-12-10 RX ORDER — SODIUM CHLORIDE 9 MG/ML
20 INJECTION, SOLUTION INTRAVENOUS ONCE
OUTPATIENT
Start: 2025-01-07

## 2024-12-10 RX ORDER — SODIUM CHLORIDE 9 MG/ML
20 INJECTION, SOLUTION INTRAVENOUS ONCE
Status: COMPLETED | OUTPATIENT
Start: 2024-12-10 | End: 2024-12-10

## 2024-12-10 RX ADMIN — DIPHENHYDRAMINE HYDROCHLORIDE 12.5 MG: 50 INJECTION, SOLUTION INTRAMUSCULAR; INTRAVENOUS at 09:00

## 2024-12-10 RX ADMIN — Medication 20 G: at 09:25

## 2024-12-10 RX ADMIN — DEXAMETHASONE SODIUM PHOSPHATE 4 MG: 10 INJECTION, SOLUTION INTRAMUSCULAR; INTRAVENOUS at 08:31

## 2024-12-10 RX ADMIN — SODIUM CHLORIDE 20 ML/HR: 0.9 INJECTION, SOLUTION INTRAVENOUS at 08:30

## 2024-12-10 RX ADMIN — ACETAMINOPHEN 650 MG: 325 TABLET ORAL at 08:32

## 2024-12-10 NOTE — PROGRESS NOTES
Pt offers no new complaints today, tolerated IGG without complications, confirmed appt back on 12/24 0800 AVS declined

## 2024-12-10 NOTE — TELEPHONE ENCOUNTER
Received call from patient stating he was returning a call from Rain that his insurance has ended . I reviewed his chart and found no documentation of the call to review with patient. Patient stated he will call his insurance tomorrow to straighten things out. He asked that Rain call him tomorrow afternoon, as he would have been able to get information about what is going on.

## 2024-12-11 DIAGNOSIS — E11.9 TYPE 2 DIABETES MELLITUS WITHOUT COMPLICATION, WITH LONG-TERM CURRENT USE OF INSULIN (HCC): ICD-10-CM

## 2024-12-11 DIAGNOSIS — E09.9 STEROID-INDUCED DIABETES (HCC): ICD-10-CM

## 2024-12-11 DIAGNOSIS — Z79.4 TYPE 2 DIABETES MELLITUS WITHOUT COMPLICATION, WITH LONG-TERM CURRENT USE OF INSULIN (HCC): ICD-10-CM

## 2024-12-11 DIAGNOSIS — T38.0X5A STEROID-INDUCED DIABETES (HCC): ICD-10-CM

## 2024-12-11 DIAGNOSIS — C91.10 CLL (CHRONIC LYMPHOCYTIC LEUKEMIA) (HCC): ICD-10-CM

## 2024-12-11 RX ORDER — PREDNISONE 5 MG/1
5 TABLET ORAL DAILY
Qty: 90 TABLET | Refills: 0 | Status: SHIPPED | OUTPATIENT
Start: 2024-12-11

## 2024-12-11 NOTE — PROGRESS NOTES
"This writer received the following email from Ashley BARAJAS:  \"I asked [facility] to see if he could pull any RX info up to see if he needed a new PA or if it would roll over and [employer] stated he wasn't pulling anything in their system for him and the [place] health plan he had last year is coming back as patient not covered.\"  This writer called Cayetano to see if anything has changed. Cayetano indicated it hasn't and that his spouse will look into it.   This writer will call on 12/11 to discuss.  Rain Martinez, MPH  Phone: [phone]  Email: [email or web address]  "

## 2024-12-12 RX ORDER — INSULIN DEGLUDEC 100 U/ML
18 INJECTION, SOLUTION SUBCUTANEOUS
Qty: 15 ML | Refills: 1 | Status: SHIPPED | OUTPATIENT
Start: 2024-12-12

## 2024-12-12 RX ORDER — PEN NEEDLE, DIABETIC 32GX 5/32"
NEEDLE, DISPOSABLE MISCELLANEOUS 3 TIMES DAILY
Qty: 300 EACH | Refills: 1 | Status: SHIPPED | OUTPATIENT
Start: 2024-12-12

## 2024-12-12 RX ORDER — INSULIN DEGLUDEC 100 U/ML
INJECTION, SOLUTION SUBCUTANEOUS
Qty: 15 ML | Refills: 5 | Status: SHIPPED | OUTPATIENT
Start: 2024-12-12

## 2024-12-18 NOTE — PLAN OF CARE
Problem: Knowledge Deficit  Goal: Patient/family/caregiver demonstrates understanding of disease process, treatment plan, medications, and discharge instructions  Complete learning assessment and assess knowledge base    Interventions:  - Provide teaching at level of understanding  - Provide teaching via preferred learning methods  Outcome: Progressing
supervision

## 2024-12-20 DIAGNOSIS — C91.10 CLL (CHRONIC LYMPHOCYTIC LEUKEMIA) (HCC): Primary | ICD-10-CM

## 2024-12-20 RX ORDER — ACETAMINOPHEN 325 MG/1
650 TABLET ORAL ONCE
Status: CANCELLED | OUTPATIENT
Start: 2024-12-24

## 2024-12-20 RX ORDER — SODIUM CHLORIDE 9 MG/ML
20 INJECTION, SOLUTION INTRAVENOUS ONCE
Status: CANCELLED | OUTPATIENT
Start: 2024-12-24

## 2024-12-24 ENCOUNTER — HOSPITAL ENCOUNTER (OUTPATIENT)
Dept: INFUSION CENTER | Facility: CLINIC | Age: 69
Discharge: HOME | End: 2024-12-24
Payer: MEDICARE

## 2024-12-24 VITALS
HEART RATE: 79 BPM | SYSTOLIC BLOOD PRESSURE: 160 MMHG | DIASTOLIC BLOOD PRESSURE: 91 MMHG | TEMPERATURE: 97.3 F | RESPIRATION RATE: 16 BRPM

## 2024-12-24 DIAGNOSIS — C91.10 CLL (CHRONIC LYMPHOCYTIC LEUKEMIA) (HCC): Primary | ICD-10-CM

## 2024-12-24 LAB
ALBUMIN SERPL BCG-MCNC: 4.3 G/DL (ref 3.5–5)
ALP SERPL-CCNC: 43 U/L (ref 34–104)
ALT SERPL W P-5'-P-CCNC: 34 U/L (ref 7–52)
ANION GAP SERPL CALCULATED.3IONS-SCNC: 5 MMOL/L (ref 4–13)
AST SERPL W P-5'-P-CCNC: 31 U/L (ref 13–39)
BASOPHILS # BLD MANUAL: 0.05 THOUSAND/UL (ref 0–0.1)
BASOPHILS NFR MAR MANUAL: 1 % (ref 0–1)
BILIRUB SERPL-MCNC: 0.87 MG/DL (ref 0.2–1)
BUN SERPL-MCNC: 17 MG/DL (ref 5–25)
CALCIUM SERPL-MCNC: 9.9 MG/DL (ref 8.4–10.2)
CHLORIDE SERPL-SCNC: 107 MMOL/L (ref 96–108)
CO2 SERPL-SCNC: 28 MMOL/L (ref 21–32)
CREAT SERPL-MCNC: 1.02 MG/DL (ref 0.6–1.3)
EOSINOPHIL # BLD MANUAL: 0.05 THOUSAND/UL (ref 0–0.4)
EOSINOPHIL NFR BLD MANUAL: 1 % (ref 0–6)
ERYTHROCYTE [DISTWIDTH] IN BLOOD BY AUTOMATED COUNT: 13.6 % (ref 11.6–15.1)
GFR SERPL CREATININE-BSD FRML MDRD: 74 ML/MIN/1.73SQ M
GLUCOSE SERPL-MCNC: 118 MG/DL (ref 65–140)
HCT VFR BLD AUTO: 44 % (ref 36.5–49.3)
HGB BLD-MCNC: 14.5 G/DL (ref 12–17)
IGG SERPL-MCNC: 503 MG/DL (ref 635–1741)
LYMPHOCYTES # BLD AUTO: 0.55 THOUSAND/UL (ref 0.6–4.47)
LYMPHOCYTES # BLD AUTO: 10 % (ref 14–44)
MCH RBC QN AUTO: 30.6 PG (ref 26.8–34.3)
MCHC RBC AUTO-ENTMCNC: 33 G/DL (ref 31.4–37.4)
MCV RBC AUTO: 93 FL (ref 82–98)
MONOCYTES # BLD AUTO: 0.33 THOUSAND/UL (ref 0–1.22)
MONOCYTES NFR BLD: 6 % (ref 4–12)
NEUTROPHILS # BLD MANUAL: 4.49 THOUSAND/UL (ref 1.85–7.62)
NEUTS SEG NFR BLD AUTO: 82 % (ref 43–75)
PLATELET # BLD AUTO: 176 THOUSANDS/UL (ref 149–390)
PLATELET BLD QL SMEAR: ADEQUATE
PMV BLD AUTO: 11.3 FL (ref 8.9–12.7)
POTASSIUM SERPL-SCNC: 4 MMOL/L (ref 3.5–5.3)
PROT SERPL-MCNC: 6.7 G/DL (ref 6.4–8.4)
RBC # BLD AUTO: 4.74 MILLION/UL (ref 3.88–5.62)
RBC MORPH BLD: NORMAL
RETICS # AUTO: NORMAL 10*3/UL (ref 14356–105094)
RETICS # CALC: 1.63 % (ref 0.37–1.87)
SODIUM SERPL-SCNC: 140 MMOL/L (ref 135–147)
WBC # BLD AUTO: 5.47 THOUSAND/UL (ref 4.31–10.16)

## 2024-12-24 PROCEDURE — 85027 COMPLETE CBC AUTOMATED: CPT | Performed by: PHYSICIAN ASSISTANT

## 2024-12-24 PROCEDURE — 85007 BL SMEAR W/DIFF WBC COUNT: CPT | Performed by: PHYSICIAN ASSISTANT

## 2024-12-24 PROCEDURE — 85045 AUTOMATED RETICULOCYTE COUNT: CPT | Performed by: PHYSICIAN ASSISTANT

## 2024-12-24 PROCEDURE — 80053 COMPREHEN METABOLIC PANEL: CPT | Performed by: PHYSICIAN ASSISTANT

## 2024-12-24 PROCEDURE — 82784 ASSAY IGA/IGD/IGG/IGM EACH: CPT | Performed by: PHYSICIAN ASSISTANT

## 2024-12-24 RX ORDER — ACETAMINOPHEN 325 MG/1
650 TABLET ORAL ONCE
Status: COMPLETED | OUTPATIENT
Start: 2024-12-24 | End: 2024-12-24

## 2024-12-24 RX ORDER — SODIUM CHLORIDE 9 MG/ML
20 INJECTION, SOLUTION INTRAVENOUS ONCE
Status: COMPLETED | OUTPATIENT
Start: 2024-12-24 | End: 2024-12-24

## 2024-12-24 RX ADMIN — SODIUM CHLORIDE 20 ML/HR: 0.9 INJECTION, SOLUTION INTRAVENOUS at 08:22

## 2024-12-24 RX ADMIN — ACETAMINOPHEN 650 MG: 325 TABLET ORAL at 08:26

## 2024-12-24 RX ADMIN — OBINUTUZUMAB 1000 MG: 1000 INJECTION, SOLUTION, CONCENTRATE INTRAVENOUS at 09:36

## 2024-12-24 RX ADMIN — DEXAMETHASONE SODIUM PHOSPHATE 20 MG: 10 INJECTION, SOLUTION INTRAMUSCULAR; INTRAVENOUS at 08:22

## 2024-12-24 RX ADMIN — DIPHENHYDRAMINE HYDROCHLORIDE 25 MG: 50 INJECTION, SOLUTION INTRAMUSCULAR; INTRAVENOUS at 08:44

## 2025-01-06 ENCOUNTER — OFFICE VISIT (OUTPATIENT)
Dept: URGENT CARE | Facility: CLINIC | Age: 70
End: 2025-01-06
Payer: MEDICARE

## 2025-01-06 ENCOUNTER — APPOINTMENT (OUTPATIENT)
Dept: RADIOLOGY | Facility: CLINIC | Age: 70
End: 2025-01-06
Payer: MEDICARE

## 2025-01-06 VITALS
OXYGEN SATURATION: 95 % | HEART RATE: 99 BPM | RESPIRATION RATE: 24 BRPM | SYSTOLIC BLOOD PRESSURE: 138 MMHG | DIASTOLIC BLOOD PRESSURE: 82 MMHG | TEMPERATURE: 98.2 F

## 2025-01-06 DIAGNOSIS — R05.1 ACUTE COUGH: Primary | ICD-10-CM

## 2025-01-06 DIAGNOSIS — R06.02 SOB (SHORTNESS OF BREATH): ICD-10-CM

## 2025-01-06 DIAGNOSIS — R05.1 ACUTE COUGH: ICD-10-CM

## 2025-01-06 DIAGNOSIS — J44.1 COPD EXACERBATION (HCC): ICD-10-CM

## 2025-01-06 PROCEDURE — 99213 OFFICE O/P EST LOW 20 MIN: CPT | Performed by: NURSE PRACTITIONER

## 2025-01-06 PROCEDURE — 71046 X-RAY EXAM CHEST 2 VIEWS: CPT

## 2025-01-06 PROCEDURE — G0463 HOSPITAL OUTPT CLINIC VISIT: HCPCS | Performed by: NURSE PRACTITIONER

## 2025-01-06 RX ORDER — PREDNISONE 20 MG/1
20 TABLET ORAL 2 TIMES DAILY WITH MEALS
Qty: 10 TABLET | Refills: 0 | Status: SHIPPED | OUTPATIENT
Start: 2025-01-06 | End: 2025-01-11

## 2025-01-06 RX ORDER — DOXYCYCLINE 100 MG/1
100 CAPSULE ORAL EVERY 12 HOURS SCHEDULED
Qty: 14 CAPSULE | Refills: 0 | Status: SHIPPED | OUTPATIENT
Start: 2025-01-06 | End: 2025-01-13

## 2025-01-06 RX ORDER — ALBUTEROL SULFATE 0.83 MG/ML
2.5 SOLUTION RESPIRATORY (INHALATION) ONCE
Status: COMPLETED | OUTPATIENT
Start: 2025-01-06 | End: 2025-01-06

## 2025-01-06 RX ORDER — FLUTICASONE PROPIONATE AND SALMETEROL 250; 50 UG/1; UG/1
1 POWDER RESPIRATORY (INHALATION) 2 TIMES DAILY
Qty: 60 BLISTER | Refills: 0 | Status: SHIPPED | OUTPATIENT
Start: 2025-01-06 | End: 2025-02-05

## 2025-01-06 RX ADMIN — ALBUTEROL SULFATE 2.5 MG: 0.83 SOLUTION RESPIRATORY (INHALATION) at 15:52

## 2025-01-06 RX ADMIN — Medication 0.5 MG: at 15:52

## 2025-01-06 NOTE — PROGRESS NOTES
[facility]        NAME: Cayetano Lucero is a 69 y.o. male  : 1955    MRN: [id number]  DATE: 2025  TIME: 4:50 PM    Assessment and Plan   Acute cough [R05.1]  1. Acute cough  XR chest pa and lateral      2. SOB (shortness of breath)  XR chest pa and lateral      3. COPD exacerbation (HCC)  albuterol inhalation solution 2.5 mg    ipratropium (ATROVENT) 0.02 % inhalation solution 0.5 mg    doxycycline hyclate (VIBRAMYCIN) 100 mg capsule    predniSONE 20 mg tablet    Fluticasone-Salmeterol (Advair Diskus) 250-50 mcg/dose inhaler      Chest xray done in office - images reviewed by myself - no evidence of acute lung disease noted   Duo neb in office - pt states feels minimal improvement   Will start course of doxy, increased prednisone, and inhaled steroid   F/u with pcp   Pt and wife in agreement with plan   Er with new or worsening symptoms.      Patient Instructions     Follow up with PCP in 3-5 days.  Proceed to  ER if symptoms worsen.    Chief Complaint     Chief Complaint   Patient presents with    Cough     Patient states that he has not felt well for a few weeks.  Fatigue, runny nose and worse cough than usual.  Thought it was the new BP meds so he stopped the Losartan as the side effect is a runny nose and fatigue.  Wife thinks the he is ill.         History of Present Illness   Cayetano Lucero presents to the clinic c/o    Cough (Patient states that he has not felt well for a few weeks.  Fatigue, runny nose and worse cough than usual.  Thought it was the new BP meds so he stopped the Losartan as the side effect is a runny nose and fatigue.  Wife thinks the he is ill.)    Cough        Review of Systems   Review of Systems   Respiratory:  Positive for cough.    All other systems reviewed and are negative.        Current Medications     Long-Term Medications   Medication Sig Dispense Refill    amLODIPine (NORVASC) 10 mg tablet TAKE 1 TABLET DAILY 90 tablet 1    Blood Glucose Monitoring  Suppl (FreeStyle Freedom Lite) w/Device KIT Use 3 (three) times a day 1 kit 0    citalopram (CeleXA) 40 mg tablet TAKE 1 TABLET DAILY 30 tablet 5    Diclofenac Sodium (VOLTAREN) 1 % Apply 4 g topically 4 (four) times a day 100 g 0    fenofibrate (TRICOR) 145 mg tablet TAKE 1 TABLET DAILY 60 tablet 5    Fluticasone-Salmeterol (Advair Diskus) 250-50 mcg/dose inhaler Inhale 1 puff 2 (two) times a day Rinse mouth after use. 60 blister 0    gabapentin (NEURONTIN) 600 MG tablet TAKE 1 TABLET DAILY 90 tablet 5    Injection Device for Insulin (CeQur Simplicity 2U) JOE Use 1 patch every 3 days, disp 1 box of 10 patches for 30 days supply 1 each 3    Injection Device for Insulin (CeQur Simplicity ) MISC Use to insert simplicity device. 1 each 1    insulin degludec (Tresiba FlexTouch) 100 units/mL injection pen Inject 18 Units under the skin daily at bedtime 15 mL 1    insulin lispro (Admelog SoloStar) 100 units/mL injection pen 12-14 units before meals 45 mL 1    labetalol (NORMODYNE) 100 mg tablet Take 1 tablet (100 mg total) by mouth 2 (two) times a day 180 tablet 3    mometasone-formoterol (Dulera) 200-5 MCG/ACT inhaler Inhale 2 puffs 2 (two) times a day Rinse mouth after use. 13 g 1    multivitamin (THERAGRAN) TABS Take 1 tablet by mouth daily      naloxone (NARCAN) 4 mg/0.1 mL nasal spray Administer 1 spray into a nostril. If no response after 2-3 minutes, give another dose in the other nostril using a new spray. 1 each 1    obinutuzumab (GAZYVA) 1000 mg/40 mL SOLN Infuse into a venous catheter      Tresiba FlexTouch 100 units/mL injection pen INJECT 12 UNITS SUB-Q DAILY AS DIRECTED. 15 mL 5    acyclovir (ZOVIRAX) 400 MG tablet TAKE 1 TABLET TWICE A DAY 60 tablet 11    insulin lispro (Admelog) 100 units/mL injection Use up to 65 units per day as directed via cecur simplicity device. (Patient not taking: Reported on 11/20/2024) 20 mL 3    Insulin Pen Needle (BD Pen Needle Inez U/F) 32G X 4 MM MISC Use 3 (three)  times a day (Patient not taking: Reported on 1/6/2025) 300 each 1    Lancets (FREESTYLE) lancets Use as instructed--test 2 times a day    E 11.9 100 each 0    Lancets (freestyle) lancets TEST BLOOD SUGAR 3 TIMES A  each 1    LORazepam (ATIVAN) 0.5 mg tablet Take 1 tablet (0.5 mg total) by mouth daily as needed for anxiety 30 tablet 0    losartan (COZAAR) 100 MG tablet TAKE 1 TABLET DAILY (Patient not taking: Reported on 1/6/2025) 60 tablet 5       Current Allergies     Allergies as of 01/06/2025 - Reviewed 01/06/2025   Allergen Reaction Noted    Heparin Other (See Comments) 06/06/2012    Macrolides and ketolides Other (See Comments) 01/13/2016    Simvastatin Myalgia 12/17/2021            The following portions of the patient's history were reviewed and updated as appropriate: allergies, current medications, past family history, past medical history, past social history, past surgical history and problem list.    Objective   /82   Pulse 99   Temp 98.2 °F (36.8 °C) (Tympanic)   Resp (!) 24   SpO2 95%        Physical Exam     Physical Exam  Vitals and nursing note reviewed.   Constitutional:       Appearance: Normal appearance. He is well-developed.   HENT:      Head: Normocephalic and atraumatic.      Right Ear: Tympanic membrane, ear canal and external ear normal.      Left Ear: Tympanic membrane, ear canal and external ear normal.      Nose: Nose normal.      Mouth/Throat:      Mouth: Mucous membranes are moist.   Eyes:      General: Lids are normal.      Conjunctiva/sclera: Conjunctivae normal.      Pupils: Pupils are equal, round, and reactive to light.   Cardiovascular:      Rate and Rhythm: Normal rate and regular rhythm.      Pulses: Normal pulses.      Heart sounds: Normal heart sounds, S1 normal and S2 normal.   Pulmonary:      Effort: Pulmonary effort is normal.      Breath sounds: Wheezing and rhonchi present.   Musculoskeletal:      Cervical back: Normal range of motion and neck supple.  "  Skin:     General: Skin is warm and dry.   Neurological:      Mental Status: He is alert.   Psychiatric:         Speech: Speech normal.         Behavior: Behavior normal.         Thought Content: Thought content normal.         Judgment: Judgment normal.                   Mini neb    Performed by: TEODORO Friend  Authorized by: TEODORO Friend  Universal Protocol:  Consent: Verbal consent obtained.  Risks and benefits: risks, benefits and alternatives were discussed  Consent given by: patient  Time out: Immediately prior to procedure a \"time out\" was called to verify the correct patient, procedure, equipment, support staff and site/side marked as required.  Timeout called at: 1/6/2025 4:50 PM.  Patient understanding: patient states understanding of the procedure being performed  Patient consent: the patient's understanding of the procedure matches consent given  Procedure consent: procedure consent matches procedure scheduled  Relevant documents: relevant documents present and verified  Test results: test results available and properly labeled  Site marked: the operative site was marked  Radiology Images displayed and confirmed. If images not available, report reviewed: imaging studies available  Required items: required blood products, implants, devices, and special equipment available  Patient identity confirmed: verbally with patient    Number of treatments:  1  Treatment 1:   Pre-Procedure     Symptoms:  Wheezing, shortness of breath and cough    Lung Sounds:  Wheezing, rhonci    HR:  99    RR:  24    SP02:  95    Medication Administered:  Duoneb - Albuterol 2.5 mg/Atrovent 0.5 mg  Post-Procedure     Symptoms:  Shortness of breath and cough    Lung sounds:  Clear    HR:  97    RR:  22    SP02:  96     "

## 2025-01-06 NOTE — PATIENT INSTRUCTIONS
Patient Education     Chronic Obstructive Pulmonary Disease (COPD) Discharge Instructions   About this topic   Chronic obstructive pulmonary disease is also called COPD. It is a lung illness. COPD is often caused by inflammation of the bronchial tubes that carry air into your lungs or by infection. Chronic bronchitis and emphysema are mostly caused by smoking. It can also be caused by breathing in toxic fumes or gases. With chronic bronchitis, your cough will bring up mucus and can last for months.     What care is needed at home?   Ask your doctor what you need to do when you go home. Make sure you ask questions if you do not understand what the doctor says.  If you smoke, try to quit. Your doctor or nurse can help you with this.  Stay away from smoke-filled places. Avoid other things that may cause breathing problems like fumes, pollution, dust, and other common allergens.  If you have an inhaler to take when you are feeling short of breath, be sure to carry it with you all the time. Then, you can take it when needed. Take all your other medicines as directed.  The doctor may have ordered antibiotics to treat an infection. It is important to take all of your antibiotics even if you start to feel better.  Work to get as healthy as possible.  Be active at home by:  Walking. Ask your doctor how far you can walk and how often you should walk.  Exercising your arms, shoulders, and legs. Ask your doctor or therapist about what exercises are best for you.  Do breathing exercises 2 to 3 times each day.  Wear a mask when you are around dust or pollution.  Protect yourself from getting sick.  Wash your hands often.  Ask visitors with a cold to wear a mask or reschedule their visit.  Save your energy.  Place the things you use within easy reach where you do not have to bend over or stretch to get them.  Use a cart with wheels to move things around your house.  Avoid heavy activities unless your doctor tells you it is  OK.  Use oxygen if you need it. Your doctor may give you oxygen to use at home. You will be taught how to use the oxygen at home by the staff that brings it to your home. Follow your doctor's advice on using it.  Never change the amount of oxygen flowing without talking to your doctor.  Always have a back-up oxygen supply at home or when you go out.  No candles, matches, cigarettes, or open flame should ever come near your oxygen.  Never smoke when using oxygen. This can cause severe burns to your face, mouth, throat, and lungs.       What follow-up care is needed?   Your doctor may ask you to make visits to the office to check on your progress. Be sure to keep these visits. You may be sent to a doctor who is specializes in lung illnesses. Your doctor may suggest you go to a lung rehab center to help improve your health.  Your doctor may order:  Breathing tests that check the amount and force of the air you breathe in and out. These are called pulmonary function tests or PFTs.  Chest x-rays  CT scan of your chest  Arterial blood gas (ABG) to measure the amount of oxygen in your blood that is taken directly from your artery  A breathing therapist to help you with your breathing exercises  Someone who can help you stop smoking, if you smoke  What drugs may be needed?   Take your drugs as ordered by your doctor. The doctor may order drugs to:  Make breathing easier  Help decrease coughing  Lower swelling in your airways  Prevent infection  Help you stop smoking  Will physical activity be limited?   Your physical activities may be limited as long as you have the signs of this health problem. Avoid heavy and tiring activities. Talk to your doctor about the right amount of activity for you.  What problems could happen?   Lung infections  High blood pressure  Heart problems  Anxiety and low mood  Lung collapse  Fluid in the lungs  What can be done to prevent this health problem?   If you have COPD you can take some steps  to keep it from getting worse.  Try to quit smoking.  If you have COPD already, continuing to smoke will make it worse more quickly.  Wear protective gear, like a mask and goggles, if exposed to irritants or toxins at work.  Stay away from crowded places.  Get a flu shot each year. Ask your doctor if you need a pneumonia shot.     When do I need to call the doctor?   Activate the emergency medical system right away if you have signs of a heart attack. Call 911 in the [place] or [place]. The sooner treatment begins, the better your chances for recovery. Call for emergency help right away if you have:  Signs of heart attack which may include:  Severe chest pain, pressure, or discomfort with:  Breathing trouble; sweating; upset stomach; or cold, clammy skin.  Pain in your arms, back, or jaw.  Worse pain with activity like walking up stairs.  Fast or irregular heartbeat.  Feeling dizzy, faint, or weak.  Call your regular doctor if:  You are having so much trouble breathing that you can only say one or two words at a time.  You need to sit upright at all times to be able to breathe and or cannot lie down.  You are very tired from working to catch your breath or you are sweating from trying to breathe.  You have trouble breathing when talking or sitting still.  You cough up blood.  You have a fever of 100.4°F (38°C) or higher or chills.  You are feeling weak when doing activities.  You have new or worsening cough.  You cough up more mucus.  You feel more short of breath.  Teach Back: Helping You Understand   The Teach Back Method helps you understand the information we are giving you. After you talk with the staff, tell them in your own words what you learned. This helps to make sure the staff has described each thing clearly. It also helps to explain things that may have been confusing. Before going home, make sure you can do these:  I can tell you about my condition.  I can tell you what I can do to help protect my  lungs and save my strength.  I can tell you what I will do if I have chest tightness, wheezing, a cough that does not go away, or trouble breathing.  Last Reviewed Date   2022-02-11  Consumer Information Use and Disclaimer   This generalized information is a limited summary of diagnosis, treatment, and/or medication information. It is not meant to be comprehensive and should be used as a tool to help the user understand and/or assess potential diagnostic and treatment options. It does NOT include all information about conditions, treatments, medications, side effects, or risks that may apply to a specific patient. It is not intended to be medical advice or a substitute for the medical advice, diagnosis, or treatment of a health care provider based on the health care provider's examination and assessment of a patient’s specific and unique circumstances. Patients must speak with a health care provider for complete information about their health, medical questions, and treatment options, including any risks or benefits regarding use of medications. This information does not endorse any treatments or medications as safe, effective, or approved for treating a specific patient. [employer], Inc[employer] and its affiliates disclaim any warranty or liability relating to this information or the use thereof. The use of this information is governed by the Terms of Use, available at https://www.[employer]ianceuwer.com/en/know/clinical-effectiveness-terms   Copyright   Copyright © 2024 [employer], [employer] and its affiliates and/or licensors. All rights reserved.

## 2025-01-07 ENCOUNTER — HOSPITAL ENCOUNTER (OUTPATIENT)
Dept: INFUSION CENTER | Facility: CLINIC | Age: 70
Discharge: HOME | End: 2025-01-07
Payer: MEDICARE

## 2025-01-07 VITALS
RESPIRATION RATE: 18 BRPM | WEIGHT: 219.58 LBS | DIASTOLIC BLOOD PRESSURE: 81 MMHG | TEMPERATURE: 97.9 F | BODY MASS INDEX: 29.78 KG/M2 | SYSTOLIC BLOOD PRESSURE: 168 MMHG | HEART RATE: 81 BPM

## 2025-01-07 DIAGNOSIS — C91.10 CLL (CHRONIC LYMPHOCYTIC LEUKEMIA) (HCC): ICD-10-CM

## 2025-01-07 DIAGNOSIS — D80.1 HYPOGAMMAGLOBULINEMIA, ACQUIRED (HCC): ICD-10-CM

## 2025-01-07 DIAGNOSIS — R76.8 LOW SERUM IGG FOR AGE: Primary | ICD-10-CM

## 2025-01-07 LAB
ALBUMIN SERPL BCG-MCNC: 4.1 G/DL (ref 3.5–5)
ALP SERPL-CCNC: 42 U/L (ref 34–104)
ALT SERPL W P-5'-P-CCNC: 36 U/L (ref 7–52)
ANION GAP SERPL CALCULATED.3IONS-SCNC: 4 MMOL/L (ref 4–13)
AST SERPL W P-5'-P-CCNC: 26 U/L (ref 13–39)
BASOPHILS # BLD MANUAL: 0 THOUSAND/UL (ref 0–0.1)
BASOPHILS NFR MAR MANUAL: 0 % (ref 0–1)
BILIRUB SERPL-MCNC: 0.66 MG/DL (ref 0.2–1)
BUN SERPL-MCNC: 17 MG/DL (ref 5–25)
CALCIUM SERPL-MCNC: 9.7 MG/DL (ref 8.4–10.2)
CHLORIDE SERPL-SCNC: 108 MMOL/L (ref 96–108)
CO2 SERPL-SCNC: 27 MMOL/L (ref 21–32)
CREAT SERPL-MCNC: 0.95 MG/DL (ref 0.6–1.3)
EOSINOPHIL # BLD MANUAL: 0.26 THOUSAND/UL (ref 0–0.4)
EOSINOPHIL NFR BLD MANUAL: 4 % (ref 0–6)
ERYTHROCYTE [DISTWIDTH] IN BLOOD BY AUTOMATED COUNT: 13.5 % (ref 11.6–15.1)
GFR SERPL CREATININE-BSD FRML MDRD: 80 ML/MIN/1.73SQ M
GLUCOSE SERPL-MCNC: 149 MG/DL (ref 65–140)
HCT VFR BLD AUTO: 42.6 % (ref 36.5–49.3)
HGB BLD-MCNC: 14.2 G/DL (ref 12–17)
IGG SERPL-MCNC: 367 MG/DL (ref 635–1741)
LYMPHOCYTES # BLD AUTO: 1.31 THOUSAND/UL (ref 0.6–4.47)
LYMPHOCYTES # BLD AUTO: 20 % (ref 14–44)
MCH RBC QN AUTO: 31.3 PG (ref 26.8–34.3)
MCHC RBC AUTO-ENTMCNC: 33.3 G/DL (ref 31.4–37.4)
MCV RBC AUTO: 94 FL (ref 82–98)
MONOCYTES # BLD AUTO: 0.85 THOUSAND/UL (ref 0–1.22)
MONOCYTES NFR BLD: 13 % (ref 4–12)
NEUTROPHILS # BLD MANUAL: 4.13 THOUSAND/UL (ref 1.85–7.62)
NEUTS SEG NFR BLD AUTO: 63 % (ref 43–75)
PLATELET # BLD AUTO: 149 THOUSANDS/UL (ref 149–390)
PLATELET BLD QL SMEAR: ADEQUATE
PMV BLD AUTO: 11.8 FL (ref 8.9–12.7)
POTASSIUM SERPL-SCNC: 4.4 MMOL/L (ref 3.5–5.3)
PROT SERPL-MCNC: 6.3 G/DL (ref 6.4–8.4)
RBC # BLD AUTO: 4.54 MILLION/UL (ref 3.88–5.62)
RBC MORPH BLD: NORMAL
SODIUM SERPL-SCNC: 139 MMOL/L (ref 135–147)
WBC # BLD AUTO: 6.56 THOUSAND/UL (ref 4.31–10.16)

## 2025-01-07 PROCEDURE — 85007 BL SMEAR W/DIFF WBC COUNT: CPT

## 2025-01-07 PROCEDURE — 96366 THER/PROPH/DIAG IV INF ADDON: CPT

## 2025-01-07 PROCEDURE — 80053 COMPREHEN METABOLIC PANEL: CPT

## 2025-01-07 PROCEDURE — 96367 TX/PROPH/DG ADDL SEQ IV INF: CPT

## 2025-01-07 PROCEDURE — 82784 ASSAY IGA/IGD/IGG/IGM EACH: CPT

## 2025-01-07 PROCEDURE — 85027 COMPLETE CBC AUTOMATED: CPT

## 2025-01-07 PROCEDURE — 96365 THER/PROPH/DIAG IV INF INIT: CPT

## 2025-01-07 RX ORDER — SODIUM CHLORIDE 9 MG/ML
20 INJECTION, SOLUTION INTRAVENOUS ONCE
Status: COMPLETED | OUTPATIENT
Start: 2025-01-07 | End: 2025-01-07

## 2025-01-07 RX ORDER — SODIUM CHLORIDE 9 MG/ML
20 INJECTION, SOLUTION INTRAVENOUS ONCE
OUTPATIENT
Start: 2025-02-04

## 2025-01-07 RX ORDER — ACETAMINOPHEN 325 MG/1
650 TABLET ORAL ONCE
Status: COMPLETED | OUTPATIENT
Start: 2025-01-07 | End: 2025-01-07

## 2025-01-07 RX ORDER — ACETAMINOPHEN 325 MG/1
650 TABLET ORAL ONCE
OUTPATIENT
Start: 2025-02-04

## 2025-01-07 RX ADMIN — DIPHENHYDRAMINE HYDROCHLORIDE 12.5 MG: 50 INJECTION, SOLUTION INTRAMUSCULAR; INTRAVENOUS at 08:44

## 2025-01-07 RX ADMIN — ACETAMINOPHEN 650 MG: 325 TABLET ORAL at 08:42

## 2025-01-07 RX ADMIN — DEXAMETHASONE SODIUM PHOSPHATE 4 MG: 10 INJECTION, SOLUTION INTRAMUSCULAR; INTRAVENOUS at 09:08

## 2025-01-07 RX ADMIN — SODIUM CHLORIDE 20 ML/HR: 0.9 INJECTION, SOLUTION INTRAVENOUS at 08:42

## 2025-01-07 RX ADMIN — Medication 20 G: at 09:37

## 2025-01-07 NOTE — PROGRESS NOTES
Pt states he went to [facility] for upper respiratory symptoms.  He was given Prednisone, an antibiotic and an inhaler.  Dr. Mejia aware and OK to treat with IVIG as scheduled today.

## 2025-01-07 NOTE — PROGRESS NOTES
Pt tolerated IVIG without incident.  Pt declined AVS but is aware of his next appt on 1/21 at 0800

## 2025-01-14 RX ORDER — SODIUM CHLORIDE 9 MG/ML
20 INJECTION, SOLUTION INTRAVENOUS ONCE
Status: CANCELLED | OUTPATIENT
Start: 2025-01-21

## 2025-01-14 RX ORDER — ACETAMINOPHEN 325 MG/1
650 TABLET ORAL ONCE
Status: CANCELLED | OUTPATIENT
Start: 2025-01-21

## 2025-01-20 DIAGNOSIS — G62.9 NEUROPATHY: ICD-10-CM

## 2025-01-21 ENCOUNTER — HOSPITAL ENCOUNTER (OUTPATIENT)
Dept: INFUSION CENTER | Facility: CLINIC | Age: 70
Discharge: HOME | End: 2025-01-21
Payer: MEDICARE

## 2025-01-21 VITALS
SYSTOLIC BLOOD PRESSURE: 126 MMHG | TEMPERATURE: 97.3 F | DIASTOLIC BLOOD PRESSURE: 78 MMHG | HEART RATE: 69 BPM | RESPIRATION RATE: 16 BRPM

## 2025-01-21 DIAGNOSIS — C91.10 CLL (CHRONIC LYMPHOCYTIC LEUKEMIA) (HCC): Primary | ICD-10-CM

## 2025-01-21 LAB
ALBUMIN SERPL BCG-MCNC: 4.2 G/DL (ref 3.5–5)
ALP SERPL-CCNC: 35 U/L (ref 34–104)
ALT SERPL W P-5'-P-CCNC: 32 U/L (ref 7–52)
ANION GAP SERPL CALCULATED.3IONS-SCNC: 4 MMOL/L (ref 4–13)
ANISOCYTOSIS BLD QL SMEAR: PRESENT
AST SERPL W P-5'-P-CCNC: 25 U/L (ref 13–39)
BASOPHILS # BLD MANUAL: 0.05 THOUSAND/UL (ref 0–0.1)
BASOPHILS NFR MAR MANUAL: 1 % (ref 0–1)
BILIRUB SERPL-MCNC: 0.7 MG/DL (ref 0.2–1)
BUN SERPL-MCNC: 22 MG/DL (ref 5–25)
BURR CELLS BLD QL SMEAR: PRESENT
CALCIUM SERPL-MCNC: 10 MG/DL (ref 8.4–10.2)
CHLORIDE SERPL-SCNC: 108 MMOL/L (ref 96–108)
CO2 SERPL-SCNC: 28 MMOL/L (ref 21–32)
CREAT SERPL-MCNC: 1.15 MG/DL (ref 0.6–1.3)
EOSINOPHIL # BLD MANUAL: 0.53 THOUSAND/UL (ref 0–0.4)
EOSINOPHIL NFR BLD MANUAL: 10 % (ref 0–6)
ERYTHROCYTE [DISTWIDTH] IN BLOOD BY AUTOMATED COUNT: 13.6 % (ref 11.6–15.1)
GFR SERPL CREATININE-BSD FRML MDRD: 64 ML/MIN/1.73SQ M
GLUCOSE SERPL-MCNC: 131 MG/DL (ref 65–140)
HCT VFR BLD AUTO: 42.3 % (ref 36.5–49.3)
HGB BLD-MCNC: 14.1 G/DL (ref 12–17)
IGG SERPL-MCNC: 531 MG/DL (ref 635–1741)
LYMPHOCYTES # BLD AUTO: 1.17 THOUSAND/UL (ref 0.6–4.47)
LYMPHOCYTES # BLD AUTO: 18 % (ref 14–44)
MCH RBC QN AUTO: 31.1 PG (ref 26.8–34.3)
MCHC RBC AUTO-ENTMCNC: 33.3 G/DL (ref 31.4–37.4)
MCV RBC AUTO: 93 FL (ref 82–98)
MONOCYTES # BLD AUTO: 0.43 THOUSAND/UL (ref 0–1.22)
MONOCYTES NFR BLD: 8 % (ref 4–12)
NEUTROPHILS # BLD MANUAL: 3.14 THOUSAND/UL (ref 1.85–7.62)
NEUTS BAND NFR BLD MANUAL: 1 % (ref 0–8)
NEUTS SEG NFR BLD AUTO: 58 % (ref 43–75)
PLATELET # BLD AUTO: 148 THOUSANDS/UL (ref 149–390)
PLATELET BLD QL SMEAR: ABNORMAL
PLATELET CLUMP BLD QL SMEAR: PRESENT
PMV BLD AUTO: 11.9 FL (ref 8.9–12.7)
POTASSIUM SERPL-SCNC: 4.3 MMOL/L (ref 3.5–5.3)
PROT SERPL-MCNC: 6.2 G/DL (ref 6.4–8.4)
RBC # BLD AUTO: 4.54 MILLION/UL (ref 3.88–5.62)
RBC MORPH BLD: PRESENT
RETICS # AUTO: NORMAL 10*3/UL (ref 14356–105094)
RETICS # CALC: 1.62 % (ref 0.37–1.87)
SODIUM SERPL-SCNC: 140 MMOL/L (ref 135–147)
VARIANT LYMPHS # BLD AUTO: 4 %
WBC # BLD AUTO: 5.32 THOUSAND/UL (ref 4.31–10.16)

## 2025-01-21 PROCEDURE — 96367 TX/PROPH/DG ADDL SEQ IV INF: CPT

## 2025-01-21 PROCEDURE — 85045 AUTOMATED RETICULOCYTE COUNT: CPT | Performed by: PHYSICIAN ASSISTANT

## 2025-01-21 PROCEDURE — 82784 ASSAY IGA/IGD/IGG/IGM EACH: CPT | Performed by: PHYSICIAN ASSISTANT

## 2025-01-21 PROCEDURE — 85027 COMPLETE CBC AUTOMATED: CPT | Performed by: PHYSICIAN ASSISTANT

## 2025-01-21 PROCEDURE — 85007 BL SMEAR W/DIFF WBC COUNT: CPT | Performed by: PHYSICIAN ASSISTANT

## 2025-01-21 PROCEDURE — 96415 CHEMO IV INFUSION ADDL HR: CPT

## 2025-01-21 PROCEDURE — 80053 COMPREHEN METABOLIC PANEL: CPT | Performed by: PHYSICIAN ASSISTANT

## 2025-01-21 PROCEDURE — 96413 CHEMO IV INFUSION 1 HR: CPT

## 2025-01-21 RX ORDER — GABAPENTIN 600 MG/1
600 TABLET ORAL DAILY
Qty: 90 TABLET | Refills: 1 | Status: SHIPPED | OUTPATIENT
Start: 2025-01-21

## 2025-01-21 RX ORDER — SODIUM CHLORIDE 9 MG/ML
20 INJECTION, SOLUTION INTRAVENOUS ONCE
Status: COMPLETED | OUTPATIENT
Start: 2025-01-21 | End: 2025-01-21

## 2025-01-21 RX ORDER — ACETAMINOPHEN 325 MG/1
650 TABLET ORAL ONCE
Status: COMPLETED | OUTPATIENT
Start: 2025-01-21 | End: 2025-01-21

## 2025-01-21 RX ADMIN — OBINUTUZUMAB 1000 MG: 1000 INJECTION, SOLUTION, CONCENTRATE INTRAVENOUS at 09:51

## 2025-01-21 RX ADMIN — ACETAMINOPHEN 650 MG: 325 TABLET ORAL at 08:28

## 2025-01-21 RX ADMIN — SODIUM CHLORIDE 20 ML/HR: 0.9 INJECTION, SOLUTION INTRAVENOUS at 08:26

## 2025-01-21 RX ADMIN — DEXAMETHASONE SODIUM PHOSPHATE 20 MG: 10 INJECTION, SOLUTION INTRAMUSCULAR; INTRAVENOUS at 08:26

## 2025-01-21 RX ADMIN — DIPHENHYDRAMINE HYDROCHLORIDE 25 MG: 50 INJECTION, SOLUTION INTRAMUSCULAR; INTRAVENOUS at 08:46

## 2025-01-21 NOTE — PROGRESS NOTES
Patient arrived to the unit and denied having any infection  He tolerated his infusion of Gazyva well without adverse reaction  [Use of Plain Language] : use of plain language [Adequate] : adequate [None] : none

## 2025-01-21 NOTE — PLAN OF CARE
Problem: Potential for Falls  Goal: Patient will remain free of falls  Description: INTERVENTIONS:  - Educate patient/family on patient safety including physical limitations  - Instruct patient to call for assistance with activity   - Consult OT/PT to assist with strengthening/mobility   - Keep Call bell within reach  - Keep bed low and locked with side rails adjusted as appropriate  - Keep care items and personal belongings within reach  - Initiate and maintain comfort rounds  - Make Fall Risk Sign visible to staff  - Apply yellow socks and bracelet for high fall risk patients  - Consider moving patient to room near nurses station  Outcome: Completed

## 2025-01-21 NOTE — PROGRESS NOTES
Pt offers no new complaints today, central labs drawn per orders, ok ot proceed without results, tolerated gazyva without complications, confirmed appt back on 2-4-25 0800 for IGG, AVS declined.

## 2025-01-27 DIAGNOSIS — J31.0 CHRONIC RHINITIS: ICD-10-CM

## 2025-01-28 RX ORDER — BENZONATATE 200 MG/1
200 CAPSULE ORAL 3 TIMES DAILY PRN
Qty: 20 CAPSULE | Refills: 51 | Status: SHIPPED | OUTPATIENT
Start: 2025-01-28

## 2025-01-29 ENCOUNTER — OFFICE VISIT (OUTPATIENT)
Dept: ENDOCRINOLOGY | Facility: CLINIC | Age: 70
End: 2025-01-29
Payer: MEDICARE

## 2025-01-29 VITALS
DIASTOLIC BLOOD PRESSURE: 78 MMHG | SYSTOLIC BLOOD PRESSURE: 110 MMHG | WEIGHT: 219.8 LBS | BODY MASS INDEX: 29.13 KG/M2 | HEART RATE: 76 BPM | HEIGHT: 73 IN

## 2025-01-29 DIAGNOSIS — E11.42 TYPE 2 DIABETES MELLITUS WITH DIABETIC POLYNEUROPATHY, WITH LONG-TERM CURRENT USE OF INSULIN (HCC): Primary | ICD-10-CM

## 2025-01-29 DIAGNOSIS — Z79.4 TYPE 2 DIABETES MELLITUS WITH DIABETIC POLYNEUROPATHY, WITH LONG-TERM CURRENT USE OF INSULIN (HCC): Primary | ICD-10-CM

## 2025-01-29 DIAGNOSIS — I10 PRIMARY HYPERTENSION: ICD-10-CM

## 2025-01-29 DIAGNOSIS — E78.2 MIXED HYPERLIPIDEMIA: ICD-10-CM

## 2025-01-29 LAB — SL AMB POCT HEMOGLOBIN AIC: 6.6 (ref ?–6.5)

## 2025-01-29 PROCEDURE — 83036 HEMOGLOBIN GLYCOSYLATED A1C: CPT

## 2025-01-29 PROCEDURE — 95251 CONT GLUC MNTR ANALYSIS I&R: CPT

## 2025-01-29 PROCEDURE — 99214 OFFICE O/P EST MOD 30 MIN: CPT

## 2025-01-29 NOTE — ASSESSMENT & PLAN NOTE
Lab Results   Component Value Date    EGFR 64 01/21/2025    EGFR 80 01/07/2025    EGFR 74 12/24/2024    CREATININE 1.15 01/21/2025    CREATININE 0.95 01/07/2025    CREATININE 1.02 12/24/2024

## 2025-01-29 NOTE — PROGRESS NOTES
Established Patient Progress Note      Chief Complaint   Patient presents with    Diabetes Type 2        Impression & Plan:    Assessment & Plan  Type 2 diabetes mellitus with diabetic polyneuropathy, with long-term current use of insulin (HCC)  Diabetes control:  HGA1C at goal  BGs well controlled, in range 73% of the time  Occasional lows after meals   Reports BGs have been high after breakfast so he has been skipping breakfast   Treatment regimen:   Recommend he continue to eat breakfast and if BGs spiking after to increase to 16 units with breakfast  Continue with 14 units with lunch and dinner  Continue with Tresiba at 18 units daily   Continue with CGM, will upgrade to Ashley 3 since he is having connectivity issues with Ashley sensor   Discussed risks/complications associated with uncontrolled diabetes.    Advised to adhere to diabetic diet, and recommended staying active/exercising routinely.  Keep carbohydrates consistent to limit blood glucose fluctuations.  Advised to call if blood sugars less than 70 mg/dl or over 300 mg/dl.   Discussed symptoms and treatment of hypoglycemia.    Recommended routine follow-up with podiatry and ophthalmology.   Ordered blood work to complete prior to next visit.   Lab Results   Component Value Date    HGBA1C 6.6 (A) 01/29/2025       Orders:    POCT hemoglobin A1c    Albumin / creatinine urine ratio; Future    Hemoglobin A1C; Future    Comprehensive metabolic panel; Future    Lipid panel; Future    Primary hypertension  BP at goal  Continue current medication regimen        Mixed hyperlipidemia  Lipid panel at goal  Continue statin          History of Present Illness:   Cayetano Lucero is a 69 y.o. male with type 2 diabetes seen in follow up. Reports complications of neuropathy. Denies recent severe hypoglycemic or severe hyperglycemic episodes. Denies any issues with his current regimen. POCT A1C was 6.6. Home glucose monitoring: are performed regularly with CGM.     Cayetano RAYMOND  Jooce   Device used: [employer] Revolution 2   Home use   Indication: Type 2 Diabetes  More than 72 hours of data was reviewed. Report to be scanned to chart.   Date Range: 1/15/25-1/28/25  Analysis of data:   Average Glucose: 154  Coefficient of Variation: 36.6%   Time in Target Range: 73%   Time Above Range: 21% high, 5% very high    Time Below Range: 1% low    Interpretation of data:  BGs in target range majority of the time, occasional highs      Current regimen:   Tresiba 18 units daily  Admelog 14-18 units before meals     Last Eye Exam: UTD  Last Foot Exam: UTD    Has hypertension: Taking losartan and amlodipine   Has hyperlipidemia: Taking fenofibrate, does not tolerate statins       Patient Active Problem List   Diagnosis    CAD (coronary artery disease)    CLL (chronic lymphocytic leukemia) (HCC)    Hyperlipidemia    Hypertension    History of TIA (transient ischemic attack)    Esophagitis, reflux    Candida esophagitis (HCC)    Stage 3 chronic kidney disease, unspecified whether stage 3a or 3b CKD (HCC)    H/O blood clots    Hemolytic anemia (HCC)    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)    Anemia, chronic disease    Chronic lymphocytic leukemia (HCC)    Leukopenia due to antineoplastic chemotherapy (HCC)    Hyperglycemia    Cellulitis of head or scalp    Pancytopenia (HCC)    Headache around the eyes    Gastroesophageal reflux disease without esophagitis    Colitis, acute    Listeria bacteremia    Thrombocytopenia (HCC)    Type 2 diabetes mellitus with hyperglycemia, with long-term current use of insulin (HCC)    Chronic right shoulder pain    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HCC)    Ulcer of esophagus without bleeding    Esophageal stricture    Dysphagia    Esophageal dysphagia    Acute bursitis of right shoulder    Hypogammaglobulinemia, acquired (HCC)    Autoimmune hemolytic anemia (HCC)    Right upper quadrant abdominal pain    Chronic obstructive pulmonary disease (HCC)    Depression, recurrent (HCC)    Long term  (current) use of systemic steroids    Cancer related pain    Preop cardiovascular exam    Type 2 diabetes mellitus with diabetic polyneuropathy, with long-term current use of insulin (HCC)    Port-A-Cath in place    Low serum IgG for age    Drug-induced osteoporosis    S/P repair of paraesophageal hernia      Past Medical History:   Diagnosis Date    Allergic     Anemia     Arthritis     CAD (coronary artery disease) 2004    Cancer (HCC)     Leukemia    Cellulitis of scalp     CKD (chronic kidney disease) stage 3, GFR 30-59 ml/min (HCC)     CLL (chronic lymphocytic leukemia) (HCC)     COPD (chronic obstructive pulmonary disease) (HCC)     Diabetes mellitus (HCC)     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    Dyslipidemia     Edema extremities     Esophageal ulcer     H/O blood clots     Hemolytic anemia (HCC)     Hiatal hernia     History of TIA (transient ischemic attack)     Hyperlipidemia     Hypertension     Listeria infection 2018    Multiple gastric ulcers     Myocardial infarction (HCC) 2004    acute    Neutropenia (HCC)     Obese     Recurrent sinusitis     Thrombocytopenia (HCC)     Vertigo       Past Surgical History:   Procedure Laterality Date    ARTHROSCOPIC REPAIR ACL      lt knee    CARDIAC SURGERY      cardiac cath with stent    FOOT SURGERY      IR PORT CHECK  5/17/2019    KNEE ARTHROSCOPY      OTHER SURGICAL HISTORY      cardiac cath lesion 1, 1st adjunct treat device: stent    PORTACATH PLACEMENT      MN ESOPHAGOGASTRODUODENOSCOPY TRANSORAL DIAGNOSTIC N/A 10/5/2017    Procedure: ESOPHAGOGASTRODUODENOSCOPY (EGD) with bx;  Surgeon: Winifred Hansen DO;  Location: [facility];  Service: Gastroenterology    MN ESOPHAGOGASTRODUODENOSCOPY TRANSORAL DIAGNOSTIC N/A 3/8/2018    Procedure: ESOPHAGOGASTRODUODENOSCOPY (EGD) with biopsy;  Surgeon: Winifred Hansen DO;  Location: [facility];  Service: Gastroenterology    MN ESOPHAGOGASTRODUODENOSCOPY TRANSORAL DIAGNOSTIC N/A 6/20/2018    Procedure: ESOPHAGOGASTRODUODENOSCOPY (EGD)  with Dilation;  Surgeon: Winifred Hansen DO;  Location: [facility];  Service: Gastroenterology    VT ESOPHAGOGASTRODUODENOSCOPY TRANSORAL DIAGNOSTIC N/A 10/18/2018    Procedure: ESOPHAGOGASTRODUODENOSCOPY (EGD) with dilation;  Surgeon: Edu Mejía MD;  Location: [facility];  Service: Gastroenterology    VT ESOPHAGOGASTRODUODENOSCOPY TRANSORAL DIAGNOSTIC N/A 2024    Procedure: ESOPHAGOGASTRODUODENOSCOPY (EGD);  Surgeon: Immanuel Gonsales MD;  Location: [facility];  Service: Thoracic    VT ESOPHAGOSCOPY FLEXIBLE TRANSORAL WITH BIOPSY N/A 2016    Procedure: ESOPHAGOGASTRODUODENOSCOPY (EGD); ESOPHAGEAL DILATION; ESOPHAGEAL BIOPSY;  Surgeon: Abdi Holcomb MD;  Location: [facility];  Service: Thoracic    VT LAPS RPR PARAESPHGL HRNA INCL FUNDPLSTY W/O MESH N/A 2024    Procedure: REPAIR HERNIA PARAESOPHAGEAL LAPAROSCOPIC W ROBOTICS TYPE 3. PARTIAL FUNDOPLICATION. MESH.;  Surgeon: Immanuel Gonsales MD;  Location: [facility];  Service: Thoracic    TONSILLECTOMY      UPPER GASTROINTESTINAL ENDOSCOPY      x 6      Social History     Tobacco Use    Smoking status: Former     Current packs/day: 0.00     Average packs/day: 2.0 packs/day for 33.0 years (66.0 ttl pk-yrs)     Types: Cigarettes     Start date:      Quit date:      Years since quittin.0     Passive exposure: Past    Smokeless tobacco: Never   Substance Use Topics    Alcohol use: Yes     Alcohol/week: 2.0 standard drinks of alcohol     Types: 2 Cans of beer per week     Comment: social use as per [employer]     Allergies   Allergen Reactions    Heparin Other (See Comments)     Other reaction(s): Blood Disorders  clot    Macrolides And Ketolides Other (See Comments)     Interacts with other meds he is taking    Simvastatin Myalgia         Current Outpatient Medications:     acyclovir (ZOVIRAX) 400 MG tablet, TAKE 1 TABLET TWICE A DAY, Disp: 60 tablet, Rfl: 11    albuterol (2.5 mg/3 mL) 0.083 % nebulizer solution, Take 3 mL (2.5 mg total)  by nebulization every 6 (six) hours as needed for wheezing or shortness of breath, Disp: 180 mL, Rfl: 5    albuterol (Ventolin HFA) 90 mcg/act inhaler, USE 2 INHALATIONS EVERY 6 HOURS AS NEEDED FOR WHEEZING, Disp: 54 g, Rfl: 5    amLODIPine (NORVASC) 10 mg tablet, TAKE 1 TABLET DAILY, Disp: 90 tablet, Rfl: 1    aspirin 81 MG tablet, Take 81 mg by mouth daily Every other day, Disp: , Rfl:     benzonatate (TESSALON) 200 MG capsule, TAKE 1 CAPSULE THREE TIMES A DAY AS NEEDED FOR COUGH, Disp: 20 capsule, Rfl: 51    Blood Glucose Monitoring Suppl (FreeStyle Freedom Lite) w/Device KIT, Use 3 (three) times a day, Disp: 1 kit, Rfl: 0    citalopram (CeleXA) 40 mg tablet, TAKE 1 TABLET DAILY, Disp: 30 tablet, Rfl: 5    Continuous Blood Gluc  (FreeStyle Ashley 2 [place]) JOE, Use to scan sensor premeals and at bedtime, Disp: 1 each, Rfl: 1    Continuous Glucose Sensor (FreeStyle Ashley 2 Sensor) MISC, Apply 1 sensor every 14 days. Use as directed with Freestyle Ashley 2 reader., Disp: 6 each, Rfl: 2    Diclofenac Sodium (VOLTAREN) 1 %, Apply 4 g topically 4 (four) times a day, Disp: 100 g, Rfl: 0    fenofibrate (TRICOR) 145 mg tablet, TAKE 1 TABLET DAILY, Disp: 60 tablet, Rfl: 5    Fluticasone-Salmeterol (Advair Diskus) 250-50 mcg/dose inhaler, Inhale 1 puff 2 (two) times a day Rinse mouth after use., Disp: 60 blister, Rfl: 0    folic acid (FOLVITE) 1 mg tablet, Take 800 mcg by mouth daily , Disp: , Rfl:     gabapentin (NEURONTIN) 600 MG tablet, TAKE 1 TABLET DAILY, Disp: 90 tablet, Rfl: 1    glucose blood (FREESTYLE LITE) test strip, Check blood sugar 3 times a day, Disp: 300 strip, Rfl: 1    Ibrutinib 140 MG TABS, Take 140 mg by mouth daily, Disp: 28 tablet, Rfl: 11    Injection Device for Insulin (CeQur Simplicity 2U) JOE, Use 1 patch every 3 days, disp 1 box of 10 patches for 30 days supply, Disp: 1 each, Rfl: 3    Injection Device for Insulin (CeQur Simplicity ) MISC, Use to insert simplicity device., Disp:  1 each, Rfl: 1    insulin degludec (Tresiba FlexTouch) 100 units/mL injection pen, Inject 18 Units under the skin daily at bedtime, Disp: 15 mL, Rfl: 1    insulin lispro (Admelog SoloStar) 100 units/mL injection pen, 12-14 units before meals, Disp: 45 mL, Rfl: 1    Lancets (FREESTYLE) lancets, Use as instructed--test 2 times a day  E 11.9, Disp: 100 each, Rfl: 0    Lancets (freestyle) lancets, TEST BLOOD SUGAR 3 TIMES A DAY, Disp: 300 each, Rfl: 1    LORazepam (ATIVAN) 0.5 mg tablet, Take 1 tablet (0.5 mg total) by mouth daily as needed for anxiety, Disp: 30 tablet, Rfl: 0    losartan (COZAAR) 100 MG tablet, TAKE 1 TABLET DAILY, Disp: 60 tablet, Rfl: 5    mometasone-formoterol (Dulera) 200-5 MCG/ACT inhaler, Inhale 2 puffs 2 (two) times a day Rinse mouth after use., Disp: 13 g, Rfl: 1    multivitamin (THERAGRAN) TABS, Take 1 tablet by mouth daily, Disp: , Rfl:     naloxone (NARCAN) 4 mg/0.1 mL nasal spray, Administer 1 spray into a nostril. If no response after 2-3 minutes, give another dose in the other nostril using a new spray., Disp: 1 each, Rfl: 1    obinutuzumab (GAZYVA) 1000 mg/40 mL SOLN, Infuse into a venous catheter, Disp: , Rfl:     oxyCODONE (ROXICODONE) 10 MG TABS, Take 1 tablet (10 mg total) by mouth every 6 (six) hours as needed for moderate pain Max Daily Amount: 40 mg, Disp: 90 tablet, Rfl: 0    posaconazole (NOXAFIL) 40 mg/mL suspension, Take by mouth daily, Disp: , Rfl:     predniSONE 5 mg tablet, Take 1 tablet (5 mg total) by mouth daily, Disp: 90 tablet, Rfl: 0    Tresiba FlexTouch 100 units/mL injection pen, INJECT 12 UNITS SUB-Q DAILY AS DIRECTED., Disp: 15 mL, Rfl: 5    Vonoprazan Fumarate 20 MG TABS, Take 20 mg by mouth in the morning, Disp: 30 tablet, Rfl: 5    Insulin Pen Needle (BD Pen Needle Inez U/F) 32G X 4 MM MISC, Use 3 (three) times a day (Patient not taking: Reported on 1/6/2025), Disp: 300 each, Rfl: 1    labetalol (NORMODYNE) 100 mg tablet, Take 1 tablet (100 mg total) by mouth 2  "(two) times a day (Patient not taking: Reported on 1/29/2025), Disp: 180 tablet, Rfl: 3    Current Facility-Administered Medications:     bupivacaine (MARCAINE) 0.25 % injection 1 mL, 1 mL, Infiltration, , , 1 mL at 09/09/24 1430    lidocaine (XYLOCAINE) 1 % injection 1 mL, 1 mL, Infiltration, , , 1 mL at 09/09/24 1430    triamcinolone acetonide (KENALOG-40) 40 mg/mL injection 20 mg, 20 mg, Infiltration, , , 20 mg at 09/09/24 1430    Review of Systems   Constitutional:  Negative for fatigue, fever and unexpected weight change.   Eyes:  Negative for visual disturbance.   Respiratory:  Negative for shortness of breath.    Cardiovascular:  Negative for chest pain.   Gastrointestinal:  Negative for abdominal pain, constipation, diarrhea, nausea and vomiting.   Endocrine: Negative for polydipsia and polyuria.   Skin:  Negative for wound.   Neurological:  Negative for dizziness, syncope and numbness.   All other systems reviewed and are negative.      Physical Exam:  Body mass index is 29.01 kg/m².  /78   Pulse 76   Ht 6' 0.99\" (1.854 m)   Wt 99.7 kg (219 lb 12.8 oz)   BMI 29.01 kg/m²    Wt Readings from Last 3 Encounters:   01/29/25 99.7 kg (219 lb 12.8 oz)   01/07/25 99.6 kg (219 lb 9.3 oz)   12/10/24 98.9 kg (218 lb)       Physical Exam  Vitals reviewed.   Constitutional:       Appearance: Normal appearance.   Cardiovascular:      Rate and Rhythm: Normal rate.   Pulmonary:      Effort: Pulmonary effort is normal.   Neurological:      Mental Status: He is alert and oriented to person, place, and time.   Psychiatric:         Mood and Affect: Mood normal.       Labs:   Lab Results   Component Value Date    HGBA1C 6.6 (A) 01/29/2025    HGBA1C 6.4 08/15/2024    HGBA1C 6.8 (H) 04/16/2024     Lab Results   Component Value Date    CREATININE 1.15 01/21/2025    CREATININE 0.95 01/07/2025    CREATININE 1.02 12/24/2024    BUN 22 01/21/2025     12/29/2015    K 4.3 01/21/2025     01/21/2025    CO2 28 " 01/21/2025     eGFR   Date Value Ref Range Status   01/21/2025 64 ml/min/1.73sq m Final     Lab Results   Component Value Date    CHOL 122 02/11/2014    HDL 33 (L) 04/16/2024    TRIG 94 04/16/2024     Lab Results   Component Value Date    ALT 32 01/21/2025    AST 25 01/21/2025    ALKPHOS 35 01/21/2025    BILITOT 0.7 12/29/2015     Lab Results   Component Value Date    WVB2JZCZOEHI 2.121 08/08/2023    NHI2BWFCBOJW 0.882 01/10/2023    NCH1ERHFJXRC 0.984 05/03/2022       There are no Patient Instructions on file for this visit.    Discussed with the patient and all questioned fully answered. He will call me if any problems arise.    TEODORO Rodríguez

## 2025-01-29 NOTE — ASSESSMENT & PLAN NOTE
Diabetes control:  HGA1C at goal  BGs well controlled, in range 73% of the time  Occasional lows after meals   Reports BGs have been high after breakfast so he has been skipping breakfast   Treatment regimen:   Recommend he continue to eat breakfast and if BGs spiking after to increase to 16 units with breakfast  Continue with 14 units with lunch and dinner  Continue with Tresiba at 18 units daily   Continue with CGM, will upgrade to Ashley 3 since he is having connectivity issues with Ashley sensor   Discussed risks/complications associated with uncontrolled diabetes.    Advised to adhere to diabetic diet, and recommended staying active/exercising routinely.  Keep carbohydrates consistent to limit blood glucose fluctuations.  Advised to call if blood sugars less than 70 mg/dl or over 300 mg/dl.   Discussed symptoms and treatment of hypoglycemia.    Recommended routine follow-up with podiatry and ophthalmology.   Ordered blood work to complete prior to next visit.   Lab Results   Component Value Date    HGBA1C 6.6 (A) 01/29/2025       Orders:    POCT hemoglobin A1c    Albumin / creatinine urine ratio; Future    Hemoglobin A1C; Future    Comprehensive metabolic panel; Future    Lipid panel; Future

## 2025-02-04 ENCOUNTER — HOSPITAL ENCOUNTER (OUTPATIENT)
Dept: INFUSION CENTER | Facility: CLINIC | Age: 70
Discharge: HOME | End: 2025-02-04
Payer: MEDICARE

## 2025-02-04 VITALS
RESPIRATION RATE: 18 BRPM | SYSTOLIC BLOOD PRESSURE: 157 MMHG | TEMPERATURE: 97.1 F | BODY MASS INDEX: 29.09 KG/M2 | DIASTOLIC BLOOD PRESSURE: 98 MMHG | HEART RATE: 81 BPM | WEIGHT: 220.46 LBS

## 2025-02-04 DIAGNOSIS — D80.1 HYPOGAMMAGLOBULINEMIA, ACQUIRED (HCC): ICD-10-CM

## 2025-02-04 DIAGNOSIS — C91.10 CLL (CHRONIC LYMPHOCYTIC LEUKEMIA) (HCC): ICD-10-CM

## 2025-02-04 DIAGNOSIS — R76.8 LOW SERUM IGG FOR AGE: Primary | ICD-10-CM

## 2025-02-04 PROCEDURE — 96367 TX/PROPH/DG ADDL SEQ IV INF: CPT

## 2025-02-04 PROCEDURE — 96366 THER/PROPH/DIAG IV INF ADDON: CPT

## 2025-02-04 PROCEDURE — 96365 THER/PROPH/DIAG IV INF INIT: CPT

## 2025-02-04 RX ORDER — SODIUM CHLORIDE 9 MG/ML
20 INJECTION, SOLUTION INTRAVENOUS ONCE
OUTPATIENT
Start: 2025-03-04

## 2025-02-04 RX ORDER — ACETAMINOPHEN 325 MG/1
650 TABLET ORAL ONCE
Status: COMPLETED | OUTPATIENT
Start: 2025-02-04 | End: 2025-02-04

## 2025-02-04 RX ORDER — ACETAMINOPHEN 325 MG/1
650 TABLET ORAL ONCE
OUTPATIENT
Start: 2025-03-04

## 2025-02-04 RX ORDER — SODIUM CHLORIDE 9 MG/ML
20 INJECTION, SOLUTION INTRAVENOUS ONCE
Status: COMPLETED | OUTPATIENT
Start: 2025-02-04 | End: 2025-02-04

## 2025-02-04 RX ADMIN — ACETAMINOPHEN 650 MG: 325 TABLET ORAL at 08:28

## 2025-02-04 RX ADMIN — Medication 20 G: at 09:24

## 2025-02-04 RX ADMIN — DEXAMETHASONE SODIUM PHOSPHATE 4 MG: 10 INJECTION, SOLUTION INTRAMUSCULAR; INTRAVENOUS at 08:50

## 2025-02-04 RX ADMIN — SODIUM CHLORIDE 20 ML/HR: 0.9 INJECTION, SOLUTION INTRAVENOUS at 08:27

## 2025-02-04 RX ADMIN — DIPHENHYDRAMINE HYDROCHLORIDE 12.5 MG: 50 INJECTION, SOLUTION INTRAMUSCULAR; INTRAVENOUS at 08:30

## 2025-02-08 ENCOUNTER — PATIENT MESSAGE (OUTPATIENT)
Dept: ENDOCRINOLOGY | Facility: CLINIC | Age: 70
End: 2025-02-08

## 2025-02-12 ENCOUNTER — TELEPHONE (OUTPATIENT)
Age: 70
End: 2025-02-12

## 2025-02-12 NOTE — TELEPHONE ENCOUNTER
Please notify patient I did order Ashley 3 monitoring system however after looking into order on parachute it can not be filled until 03/09/2025 since that is when he is due for a refill. They will not send new sensors since he was already send Ashley 2 sensors. Unfortunately, I can not change this date. He should receive Ashley 3 sensors with his next shipment.

## 2025-02-12 NOTE — TELEPHONE ENCOUNTER
Pt calling asking if his freestyle merary 3 system was ordered. He is having issue with the merary 3. He thought it was going to be ordered at his last office visit. Pt stated he sent 2 [employer] messages with screen shots of is current sensor not working.   Pt would like a call  with an update.

## 2025-02-12 NOTE — PATIENT COMMUNICATION
Patient called asking if provider can respond to MyChart message. States he needs a new CGM. Thank you

## 2025-02-13 NOTE — TELEPHONE ENCOUNTER
Spoke with patient and he requested me to send information via my chart because he was just waking up. I advised patient to call [facility] due to all of the issues he's been having.

## 2025-02-17 ENCOUNTER — TELEPHONE (OUTPATIENT)
Dept: HEMATOLOGY ONCOLOGY | Facility: CLINIC | Age: 70
End: 2025-02-17

## 2025-02-17 NOTE — TELEPHONE ENCOUNTER
Patient needs to be scheduled for FU appointment  It does not look like one was scheduled at last visit

## 2025-02-18 ENCOUNTER — HOSPITAL ENCOUNTER (OUTPATIENT)
Dept: INFUSION CENTER | Facility: CLINIC | Age: 70
Discharge: HOME | End: 2025-02-18
Payer: MEDICARE

## 2025-02-18 VITALS
SYSTOLIC BLOOD PRESSURE: 167 MMHG | HEIGHT: 72 IN | RESPIRATION RATE: 18 BRPM | WEIGHT: 223 LBS | BODY MASS INDEX: 30.2 KG/M2 | TEMPERATURE: 97.2 F | HEART RATE: 71 BPM | DIASTOLIC BLOOD PRESSURE: 90 MMHG

## 2025-02-18 DIAGNOSIS — R76.8 LOW SERUM IGG FOR AGE: ICD-10-CM

## 2025-02-18 DIAGNOSIS — C91.10 CLL (CHRONIC LYMPHOCYTIC LEUKEMIA) (HCC): Primary | ICD-10-CM

## 2025-02-18 LAB
ALBUMIN SERPL BCG-MCNC: 4.3 G/DL (ref 3.5–5)
ALP SERPL-CCNC: 39 U/L (ref 34–104)
ALT SERPL W P-5'-P-CCNC: 32 U/L (ref 7–52)
ANION GAP SERPL CALCULATED.3IONS-SCNC: 5 MMOL/L (ref 4–13)
ANISOCYTOSIS BLD QL SMEAR: PRESENT
AST SERPL W P-5'-P-CCNC: 27 U/L (ref 13–39)
BASOPHILS # BLD MANUAL: 0.06 THOUSAND/UL (ref 0–0.1)
BASOPHILS NFR MAR MANUAL: 1 % (ref 0–1)
BILIRUB SERPL-MCNC: 0.57 MG/DL (ref 0.2–1)
BUN SERPL-MCNC: 17 MG/DL (ref 5–25)
CALCIUM SERPL-MCNC: 9.5 MG/DL (ref 8.4–10.2)
CHLORIDE SERPL-SCNC: 107 MMOL/L (ref 96–108)
CO2 SERPL-SCNC: 27 MMOL/L (ref 21–32)
CREAT SERPL-MCNC: 1.08 MG/DL (ref 0.6–1.3)
EOSINOPHIL # BLD MANUAL: 0.18 THOUSAND/UL (ref 0–0.4)
EOSINOPHIL NFR BLD MANUAL: 3 % (ref 0–6)
ERYTHROCYTE [DISTWIDTH] IN BLOOD BY AUTOMATED COUNT: 13.4 % (ref 11.6–15.1)
GFR SERPL CREATININE-BSD FRML MDRD: 69 ML/MIN/1.73SQ M
GIANT PLATELETS BLD QL SMEAR: PRESENT
GLUCOSE SERPL-MCNC: 161 MG/DL (ref 65–140)
HCT VFR BLD AUTO: 43.9 % (ref 36.5–49.3)
HGB BLD-MCNC: 14.5 G/DL (ref 12–17)
IGG SERPL-MCNC: 528 MG/DL (ref 635–1741)
LYMPHOCYTES # BLD AUTO: 0.55 THOUSAND/UL (ref 0.6–4.47)
LYMPHOCYTES # BLD AUTO: 9 % (ref 14–44)
MCH RBC QN AUTO: 30.3 PG (ref 26.8–34.3)
MCHC RBC AUTO-ENTMCNC: 33 G/DL (ref 31.4–37.4)
MCV RBC AUTO: 92 FL (ref 82–98)
MONOCYTES # BLD AUTO: 0.79 THOUSAND/UL (ref 0–1.22)
MONOCYTES NFR BLD: 13 % (ref 4–12)
MYELOCYTE ABSOLUTE CT: 0.06 THOUSAND/UL (ref 0–0.1)
MYELOCYTES NFR BLD MANUAL: 1 % (ref 0–1)
NEUTROPHILS # BLD MANUAL: 4.42 THOUSAND/UL (ref 1.85–7.62)
NEUTS BAND NFR BLD MANUAL: 2 % (ref 0–8)
NEUTS SEG NFR BLD AUTO: 71 % (ref 43–75)
NRBC BLD AUTO-RTO: 1 /100 WBC (ref 0–2)
PLATELET # BLD AUTO: 172 THOUSANDS/UL (ref 149–390)
PLATELET BLD QL SMEAR: ADEQUATE
PMV BLD AUTO: 11.6 FL (ref 8.9–12.7)
POLYCHROMASIA BLD QL SMEAR: PRESENT
POTASSIUM SERPL-SCNC: 4.1 MMOL/L (ref 3.5–5.3)
PROT SERPL-MCNC: 6.4 G/DL (ref 6.4–8.4)
RBC # BLD AUTO: 4.78 MILLION/UL (ref 3.88–5.62)
RBC MORPH BLD: PRESENT
RETICS # AUTO: NORMAL 10*3/UL (ref 14356–105094)
RETICS # CALC: 1.36 % (ref 0.37–1.87)
SODIUM SERPL-SCNC: 139 MMOL/L (ref 135–147)
WBC # BLD AUTO: 6.06 THOUSAND/UL (ref 4.31–10.16)

## 2025-02-18 PROCEDURE — 85045 AUTOMATED RETICULOCYTE COUNT: CPT | Performed by: PHYSICIAN ASSISTANT

## 2025-02-18 PROCEDURE — 96415 CHEMO IV INFUSION ADDL HR: CPT

## 2025-02-18 PROCEDURE — 85007 BL SMEAR W/DIFF WBC COUNT: CPT

## 2025-02-18 PROCEDURE — 96367 TX/PROPH/DG ADDL SEQ IV INF: CPT

## 2025-02-18 PROCEDURE — 96413 CHEMO IV INFUSION 1 HR: CPT

## 2025-02-18 PROCEDURE — 80053 COMPREHEN METABOLIC PANEL: CPT

## 2025-02-18 PROCEDURE — 82784 ASSAY IGA/IGD/IGG/IGM EACH: CPT

## 2025-02-18 PROCEDURE — 85027 COMPLETE CBC AUTOMATED: CPT

## 2025-02-18 RX ORDER — SODIUM CHLORIDE 9 MG/ML
20 INJECTION, SOLUTION INTRAVENOUS ONCE
Status: CANCELLED | OUTPATIENT
Start: 2025-02-18

## 2025-02-18 RX ORDER — ACETAMINOPHEN 325 MG/1
650 TABLET ORAL ONCE
Status: COMPLETED | OUTPATIENT
Start: 2025-02-18 | End: 2025-02-18

## 2025-02-18 RX ORDER — SODIUM CHLORIDE 9 MG/ML
20 INJECTION, SOLUTION INTRAVENOUS ONCE
Status: COMPLETED | OUTPATIENT
Start: 2025-02-18 | End: 2025-02-18

## 2025-02-18 RX ORDER — ACETAMINOPHEN 325 MG/1
650 TABLET ORAL ONCE
Status: CANCELLED | OUTPATIENT
Start: 2025-02-18

## 2025-02-18 RX ADMIN — DEXAMETHASONE SODIUM PHOSPHATE 20 MG: 10 INJECTION, SOLUTION INTRAMUSCULAR; INTRAVENOUS at 09:00

## 2025-02-18 RX ADMIN — DIPHENHYDRAMINE HYDROCHLORIDE 25 MG: 50 INJECTION, SOLUTION INTRAMUSCULAR; INTRAVENOUS at 09:23

## 2025-02-18 RX ADMIN — SODIUM CHLORIDE 20 ML/HR: 9 INJECTION, SOLUTION INTRAVENOUS at 08:40

## 2025-02-18 RX ADMIN — ACETAMINOPHEN 650 MG: 325 TABLET ORAL at 08:44

## 2025-02-18 RX ADMIN — OBINUTUZUMAB 1000 MG: 1000 INJECTION, SOLUTION, CONCENTRATE INTRAVENOUS at 09:48

## 2025-02-18 NOTE — PROGRESS NOTES
Pt presents for gazyva. No new meds or concerns. Pt tolerated treatment without adverse reaction. Future appointments discussed, confirmed with patient for 3/4/25 0800. AVS declined.

## 2025-02-25 ENCOUNTER — OFFICE VISIT (OUTPATIENT)
Dept: HEMATOLOGY ONCOLOGY | Facility: CLINIC | Age: 70
End: 2025-02-25
Payer: MEDICARE

## 2025-02-25 VITALS
HEIGHT: 73 IN | SYSTOLIC BLOOD PRESSURE: 130 MMHG | TEMPERATURE: 97.1 F | WEIGHT: 221 LBS | HEART RATE: 78 BPM | DIASTOLIC BLOOD PRESSURE: 80 MMHG | RESPIRATION RATE: 18 BRPM | OXYGEN SATURATION: 97 % | BODY MASS INDEX: 29.29 KG/M2

## 2025-02-25 DIAGNOSIS — R76.8 LOW SERUM IGG FOR AGE: ICD-10-CM

## 2025-02-25 DIAGNOSIS — E11.42 TYPE 2 DIABETES MELLITUS WITH DIABETIC POLYNEUROPATHY, WITH LONG-TERM CURRENT USE OF INSULIN (HCC): ICD-10-CM

## 2025-02-25 DIAGNOSIS — I25.10 CORONARY ARTERY DISEASE INVOLVING NATIVE CORONARY ARTERY OF NATIVE HEART WITHOUT ANGINA PECTORIS: ICD-10-CM

## 2025-02-25 DIAGNOSIS — D59.19 OTHER AUTOIMMUNE HEMOLYTIC ANEMIA (HCC): ICD-10-CM

## 2025-02-25 DIAGNOSIS — J44.9 CHRONIC OBSTRUCTIVE PULMONARY DISEASE, UNSPECIFIED COPD TYPE (HCC): ICD-10-CM

## 2025-02-25 DIAGNOSIS — D75.829 HIT (HEPARIN-INDUCED THROMBOCYTOPENIA) (HCC): ICD-10-CM

## 2025-02-25 DIAGNOSIS — N18.30 STAGE 3 CHRONIC KIDNEY DISEASE, UNSPECIFIED WHETHER STAGE 3A OR 3B CKD (HCC): ICD-10-CM

## 2025-02-25 DIAGNOSIS — Z79.4 TYPE 2 DIABETES MELLITUS WITH DIABETIC POLYNEUROPATHY, WITH LONG-TERM CURRENT USE OF INSULIN (HCC): ICD-10-CM

## 2025-02-25 DIAGNOSIS — C91.10 CLL (CHRONIC LYMPHOCYTIC LEUKEMIA) (HCC): Primary | ICD-10-CM

## 2025-02-25 DIAGNOSIS — D80.1 HYPOGAMMAGLOBULINEMIA, ACQUIRED (HCC): ICD-10-CM

## 2025-02-25 DIAGNOSIS — I10 PRIMARY HYPERTENSION: ICD-10-CM

## 2025-02-25 PROCEDURE — G2211 COMPLEX E/M VISIT ADD ON: HCPCS | Performed by: INTERNAL MEDICINE

## 2025-02-25 PROCEDURE — 99214 OFFICE O/P EST MOD 30 MIN: CPT | Performed by: INTERNAL MEDICINE

## 2025-02-25 NOTE — ASSESSMENT & PLAN NOTE
Patient is symptomatic from chronic lung disease and has cough and exertional dyspnea, no worse this time than before

## 2025-02-25 NOTE — ASSESSMENT & PLAN NOTE
No hemolysis at present.  Orders:    CBC and differential; Standing    Comprehensive metabolic panel; Standing    IgG; Standing    Retic Count; Standing

## 2025-02-25 NOTE — ASSESSMENT & PLAN NOTE
Patient has CLL for at least 20 years and one-time he was very sick when hemoglobin had dropped down to 4.9 and platelet count 65,000 WBC count was 195,000.  He is feeling much better he is on IVIG and Gazyva each one every 4 weeks alternating but not together.  Also ibrutinib 140 mg daily.  He is not symptomatic from CLL anymore.    Orders:    CBC and differential; Standing    Comprehensive metabolic panel; Standing    IgG; Standing

## 2025-02-25 NOTE — ASSESSMENT & PLAN NOTE
Patient receives IVIG therapy  Orders:    CBC and differential; Standing    Comprehensive metabolic panel; Standing    IgG; Standing

## 2025-02-25 NOTE — ASSESSMENT & PLAN NOTE
Lab Results   Component Value Date    HGBA1C 6.6 (A) 01/29/2025     He is trying to have good control on blood sugar.  He has peripheral neuropathy.

## 2025-02-25 NOTE — ASSESSMENT & PLAN NOTE
Lab Results   Component Value Date    EGFR 69 02/18/2025    EGFR 64 01/21/2025    EGFR 80 01/07/2025    CREATININE 1.08 02/18/2025    CREATININE 1.15 01/21/2025    CREATININE 0.95 01/07/2025     Being managed by PCP

## 2025-02-25 NOTE — PROGRESS NOTES
Name: Cayetano Lucero      : 1955      MRN: [id number]  Encounter Provider: Gideon Mejia MD  Encounter Date: 2025   Encounter department: [facility] HEMATOLOGY ONCOLOGY SPECIALISTS TC  :  Assessment & Plan  CLL (chronic lymphocytic leukemia) ([facility])  Patient has CLL for at least 20 years and one-time he was very sick when hemoglobin had dropped down to 4.9 and platelet count 65,000 WBC count was 195,000.  He is feeling much better he is on IVIG and Gazyva each one every 4 weeks alternating but not together.  Also ibrutinib 140 mg daily.  He is not symptomatic from CLL anymore.    Orders:    CBC and differential; Standing    Comprehensive metabolic panel; Standing    IgG; Standing    Other autoimmune hemolytic anemia ([facility])  No hemolysis at present.  Orders:    CBC and differential; Standing    Comprehensive metabolic panel; Standing    IgG; Standing    Retic Count; Standing    Hypogammaglobulinemia, acquired ([facility])  Patient receives IVIG therapy  Orders:    CBC and differential; Standing    Comprehensive metabolic panel; Standing    IgG; Standing    Low serum IgG for age  Patient receives IVIG therapy  Orders:    CBC and differential; Standing    Comprehensive metabolic panel; Standing    IgG; Standing    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[facility])  Patient knows that he is allergic to heparin       Chronic obstructive pulmonary disease, unspecified COPD type ([facility])  Patient is symptomatic from chronic lung disease and has cough and exertional dyspnea, no worse this time than before       Type 2 diabetes mellitus with diabetic polyneuropathy, with long-term current use of insulin ([facility])    Lab Results   Component Value Date    HGBA1C 6.6 (A) 2025     He is trying to have good control on blood sugar.  He has peripheral neuropathy.       Coronary artery disease involving native coronary artery of native heart without angina pectoris  He follows with his cardiologist       Primary  hypertension  Being managed by PCP and cardiologist       Stage 3 chronic kidney disease, unspecified whether stage 3a or 3b CKD ([facility])  Lab Results   Component Value Date    EGFR 69 02/18/2025    EGFR 64 01/21/2025    EGFR 80 01/07/2025    CREATININE 1.08 02/18/2025    CREATININE 1.15 01/21/2025    CREATININE 0.95 01/07/2025     Being managed by PCP            He has standing orders for Gazyva and IVIG and blood work.  Follow-up in 3 months.  Reviewed all recent test results.  Hematologically stable.  No change in therapy.  Condition discussed and explained.  Questions answered.  He is encouraged to have better management of his COPD from his PCP and lung specialist.  Patient is capable of self-care.  Goal is management of CLL and patient to have better management of his other medical conditions.  I suggested eating healthy foods, staying active but to avoid falls and trauma.  Suggested health screening tests. Patient to continue to follow-up with primary physician and other consultants.  Provided counseling and support.  I used a dictation device to dictate this note and there could be mistakes in my note and for that patient may contact my office.      History of Present Illness   Chief Complaint   Patient presents with    Follow-up   Follow-up visit for stage IV CLL and longstanding history of  more than 20 years with complications but presently patient's condition is stable on 140 mg ibrutinib daily, Gazyva once a month and IVIG infusion once a month.  In addition he is also on prednisone 5 mg daily, folic acid and acyclovir.  He has been taking vitamin D and calcium.  Bone density test has shown mild case of osteopenia.  CLL was diagnosed more than 20 years ago and he had multiple lines of therapies and at 1 time his hemoglobin was down to 4.9 because of  autoimmune hemolytic anemia and CLL.    CLL was diagnosed several years ago. He had been through Fludara based chemotherapy and pentostatin based  chemotherapy. He had increased hemolysis when he was on chemotherapy. His most recent chemotherapy treatment was Cytoxan, Oncovin, prednisone and Rituxan and last treatment was in December 2012.. In 2013 he was started on Rituxan, prednisone and folic acid because he started to hemolyse again. IVIG was tried unsuccessfully so far as hemolysis is concerned. Rituxan has controlled the hemolysis. ...  Information on the treatments from March 2017 onwards.  On 3/20/17 patient found to have hemoglobin of 6.2, ,000. He was admitted to [facility] for blood transfusion and plan to transfer to [facility].   On 3/20/17 WBC count 195,000, hemoglobin 4.9, platelet count 65. Direct coomb's was positive with undetectable haptoglobin. He was given 2 units of PRBCs, Solu-Medrol 1 g ×3 days and IVIG 1 g/kg daily ×3 days. His hemoglobin continued to drop. Bone marrow biopsy on 3/21 showed marrow cellularity of greater than 95% almost replaced by small lymphocytes, lambda light chain restricted, CD20 positive, CD19 positive, CD23 positive partially expressing CD11c and CD25 and CD5 positive and negative for CD 79 and CD38.  He was treated with single dose of chlorambucil to control his CLL.  3/22 second dose of chlorambucil, also Rituxan 375 mg/M ² autoimmune hemolytic anemia. WBC continued to rise, 266,000.  Patient was initiated with Venetoclax 20 mg daily. Tumor lysis monitored every 8 hours; given aggressive hydration and Lasix and remained euvolemic.  Later venetoclax was changed to ibrutinib because of disease progression  Dec 2017- Imbruvica decreased to 140 mg PO daily due to thrombocytopenia .  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 He was discharged on IV ampicillin. Echo negative for vegetations.   He is doing very well on present treatment plan so far as CLL is concerned  Patient had cataract surgeries in December 2023   He  "has tiredness, chronic nonproductive cough, exertional dyspnea, occasionally wheezing, arthritic symptoms and for that he is on oxycodone.  He has  vascular purpura on forearms.  He has chronic lung disease.  History of diabetes mellitus, hypertension and coronary artery disease.  .  For dysphagia he had EGD and dilatation in September 2020 and he follows with his gastroenterologist.   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    He has coronary artery disease and has 1 stent and he takes 81 mg aspirin daily with food.  Has tiredness and arthritic symptoms and vascular purpura on forearms.  Pertinent Medical History     02/25/25:    See details in HPI.  Review of Systems  Reviewed 20 systems.  Symptoms are as in HPI.  No fevers, chills, bleeding, bone pains, skin rash, weight loss, night sweats and no swelling of the ankles and no swollen glands.  No other neurological, cardiac, pulmonary, GI and  symptoms other than listed in HPI.      Objective   /80 (BP Location: Right arm, Patient Position: Sitting, Cuff Size: Large)   Pulse 78   Temp (!) 97.1 °F (36.2 °C) (Temporal)   Resp 18   Ht 6' 1\" (1.854 m)   Wt 100 kg (221 lb)   SpO2 97%   BMI 29.16 kg/m²     Physical Exam  Vitals reviewed.   Constitutional:       General: He is not in acute distress.     Appearance: Normal appearance. He is not ill-appearing.   HENT:      Head: Normocephalic and atraumatic.      Mouth/Throat:      Comments: No thrush.  Eyes:      General: No scleral icterus.     Conjunctiva/sclera: Conjunctivae normal.   Cardiovascular:      Rate and Rhythm: Normal rate and regular rhythm.      Heart sounds: Normal heart sounds. No murmur heard.  Pulmonary:      Effort: Pulmonary effort is normal. No respiratory distress.      Breath sounds: Rhonchi present. No rales.      Comments: Few scattered expiratory rhonchi  Abdominal:      General: Abdomen is flat. There is no distension.      Palpations: Abdomen is " soft. There is no mass.      Tenderness: There is no abdominal tenderness.      Comments: No ascites.    Musculoskeletal:         General: No swelling or tenderness. Normal range of motion.      Cervical back: Normal range of motion. No tenderness.      Right lower leg: No edema.      Left lower leg: No edema.      Comments: No calf tenderness.   Lymphadenopathy:      Cervical: No cervical adenopathy.      Upper Body:      Right upper body: No supraclavicular or axillary adenopathy.      Left upper body: No supraclavicular or axillary adenopathy.   Skin:     Findings: Bruising (Has vascular purpura on forearms) present. No rash.      Nails: There is no clubbing.   Neurological:      General: No focal deficit present.      Mental Status: He is alert and oriented to person, place, and time.      Motor: No weakness.      Coordination: Coordination normal.      Gait: Gait normal.   Psychiatric:         Behavior: Behavior normal.         Thought Content: Thought content normal.      Comments: Anxious         Labs: I have reviewed the following labs:  Results for orders placed or performed during the hospital encounter of 02/18/25   CBC and differential   Result Value Ref Range    WBC 6.06 4.31 - 10.16 Thousand/uL    RBC 4.78 3.88 - 5.62 Million/uL    Hemoglobin 14.5 12.0 - 17.0 g/dL    Hematocrit 43.9 36.5 - 49.3 %    MCV 92 82 - 98 fL    MCH 30.3 26.8 - 34.3 pg    MCHC 33.0 31.4 - 37.4 g/dL    RDW 13.4 11.6 - 15.1 %    MPV 11.6 8.9 - 12.7 fL    Platelets 172 149 - 390 Thousands/uL   Comprehensive metabolic panel   Result Value Ref Range    Sodium 139 135 - 147 mmol/L    Potassium 4.1 3.5 - 5.3 mmol/L    Chloride 107 96 - 108 mmol/L    CO2 27 21 - 32 mmol/L    ANION GAP 5 4 - 13 mmol/L    BUN 17 5 - 25 mg/dL    Creatinine 1.08 0.60 - 1.30 mg/dL    Glucose 161 (H) 65 - 140 mg/dL    Calcium 9.5 8.4 - 10.2 mg/dL    AST 27 13 - 39 U/L    ALT 32 7 - 52 U/L    Alkaline Phosphatase 39 34 - 104 U/L    Total Protein 6.4 6.4 - 8.4  g/dL    Albumin 4.3 3.5 - 5.0 g/dL    Total Bilirubin 0.57 0.20 - 1.00 mg/dL    eGFR 69 ml/min/1.73sq m   Result Value Ref Range     (L) 635 - 1,741 mg/dL   Retic Count   Result Value Ref Range    Retic Ct Abs 65,000 14,356 - 105,094    Retic Ct Pct 1.36 0.37 - 1.87 %   Manual Differential(PHLEBS Do Not Order)   Result Value Ref Range    Segmented % 71 43 - 75 %    Bands % 2 0 - 8 %    Lymphocytes % 9 (L) 14 - 44 %    Monocytes % 13 (H) 4 - 12 %    Eosinophils % 3 0 - 6 %    Basophils % 1 0 - 1 %    Myelocytes % 1 0 - 1 %    Absolute Neutrophils 4.42 1.85 - 7.62 Thousand/uL    Absolute Lymphocytes 0.55 (L) 0.60 - 4.47 Thousand/uL    Absolute Monocytes 0.79 0.00 - 1.22 Thousand/uL    Absolute Eosinophils 0.18 0.00 - 0.40 Thousand/uL    Absolute Basophils 0.06 0.00 - 0.10 Thousand/uL    Absolute Myelocytes 0.06 0.00 - 0.10 Thousand/uL    Total Counted      nRBC 1 0 - 2 /100 WBC    RBC Morphology Present     Platelet Estimate Adequate Adequate    Giant PLTs Present     Anisocytosis Present     Polychromasia Present      *Note: Due to a large number of results and/or encounters for the requested time period, some results have not been displayed. A complete set of results can be found in Results Review.     XR chest pa and lateral  Status: Final result     PACS Images     Show images for XR chest pa and lateral  Study Result    Narrative & Impression   XR CHEST PA AND LATERAL DUAL ENERGY SUBTRACTION.     INDICATION:  R05.1: Acute cough  R06.02: Shortness of breath.     COMPARISON: Subtle opacity in the right midlung.     FINDINGS: Right port at cavoatrial junction.     Clear lungs. No pneumothorax or pleural effusion.     Unremarkable cardiomediastinal silhouette.     Bones are unremarkable for age.     Unremarkable upper abdomen.     IMPRESSION:     Subtle opacity in the right midlung, infectious or inflammatory.     I notified ANTHONY GREGORY on 1/7/2025 5:38 AM by epic message. This study was also marked for  significant notification.                       Workstation performed: [id number]        Imaging    XR chest pa and lateral (Order: [id number]) - 1/6/202    DXA bone density spine hip and pelvis  Status: Final result     PACS Images     Show images for DXA bone density spine hip and pelvis  Study Result    Narrative & Impression   CENTRAL  DXA SCAN     CLINICAL HISTORY:   69 year old  male risk factors include past study noted low bone mass. Additional medical history: Leukemia, steroid treatment for 22 years to present time. Type 2 diabetes.     TECHNIQUE: Bone densitometry was performed using a Hologic Horizon W bone densitometer.  Regions of interest appear properly placed. There are   other confounding variables which could limit the study.     His elevated BMI may influence the T score result.     Degenerative or osteoarthritic changes are noted on the spine image notably at L2 and L3 eliminating those to vertebrae from evaluation.     COMPARISON: Follow-up     RESULTS:  LUMBAR SPINE: L1, L4:  BMD 0.924 gm/cm2  T-score -1.5 and 3.0% higher than 2022, statistically significant change, likely related to the spine image findings. The results are inaccurate due to no 2 contiguous vertebrae being available for evaluation.  Z-score  The changes are significant at the 95% confidence level. The LSC is 0.022 g/sq cm.     LEFT TOTAL HIP:  BMD 1.099 gm/cm2  T-score 0.4  Z-score     LEFT FEMORAL NECK:  BMD 0.805 gm/cm2  T-score -0.9 and 1.5% higher than 2022 and 1.2% higher than 2019.  Z-score     The left forearm BMD is 0.799 and the T score is -0.3.     IMPRESSION:  1. Based on the WHO classification, this study identifies a diagnosis of low bone mass, notable at the spine area and the patient is considered at low risk for fracture.     2. A daily intake of calcium of at least 1200 mg and vitamin D, 800-1000 IU, as well as weight bearing and muscle strengthening exercise, fall prevention and avoidance of tobacco  and excessive alcohol intake as basic preventive measures are recommended.     3. Repeat DXA scan on the same equipment in 18-24 months as clinically indicated.        The 10 year risk of hip fracture is 1.2%, with the 10 year risk of major osteoporotic fracture being 7.7%, as calculated by the WHO fracture risk assessment tool (FRAX).  The current NOF guidelines recommend treating patients with FRAX 10 year risk score   of >3% for hip fracture and >20% for major osteoporotic fracture.     WHO CLASSIFICATION:  Normal (a T-score of -1.0 or higher)  Low bone mineral density (a T-score of less than -1.0 but higher than -2.5)  Osteoporosis (a T-score of -2.5 or less)  Severe osteoporosis (a T-score of -2.5 or less with a fragility fracture)     Thank you for allowing us the opportunity to participate in your patient care.     The expanded DEXA report will no longer be arriving in your mail. If you desire to view the full report please contact [facility]'s medical records or access the PACS system.              Workstation performed: [id number]        Imaging    DXA bone density spine hip and pelvis (Order: [id number]) - 10/23/2024

## 2025-03-04 ENCOUNTER — HOSPITAL ENCOUNTER (OUTPATIENT)
Dept: INFUSION CENTER | Facility: CLINIC | Age: 70
Discharge: HOME | End: 2025-03-04
Payer: MEDICARE

## 2025-03-04 VITALS
WEIGHT: 223.5 LBS | RESPIRATION RATE: 18 BRPM | HEART RATE: 70 BPM | TEMPERATURE: 97.3 F | DIASTOLIC BLOOD PRESSURE: 80 MMHG | SYSTOLIC BLOOD PRESSURE: 163 MMHG | BODY MASS INDEX: 29.49 KG/M2

## 2025-03-04 DIAGNOSIS — R76.8 LOW SERUM IGG FOR AGE: Primary | ICD-10-CM

## 2025-03-04 DIAGNOSIS — D80.1 HYPOGAMMAGLOBULINEMIA, ACQUIRED (HCC): ICD-10-CM

## 2025-03-04 DIAGNOSIS — C91.10 CLL (CHRONIC LYMPHOCYTIC LEUKEMIA) (HCC): ICD-10-CM

## 2025-03-04 PROCEDURE — 96367 TX/PROPH/DG ADDL SEQ IV INF: CPT

## 2025-03-04 PROCEDURE — 96366 THER/PROPH/DIAG IV INF ADDON: CPT

## 2025-03-04 PROCEDURE — 96365 THER/PROPH/DIAG IV INF INIT: CPT

## 2025-03-04 RX ORDER — ACETAMINOPHEN 325 MG/1
650 TABLET ORAL ONCE
Status: COMPLETED | OUTPATIENT
Start: 2025-03-04 | End: 2025-03-04

## 2025-03-04 RX ORDER — SODIUM CHLORIDE 9 MG/ML
20 INJECTION, SOLUTION INTRAVENOUS ONCE
OUTPATIENT
Start: 2025-04-01

## 2025-03-04 RX ORDER — SODIUM CHLORIDE 9 MG/ML
20 INJECTION, SOLUTION INTRAVENOUS ONCE
Status: COMPLETED | OUTPATIENT
Start: 2025-03-04 | End: 2025-03-04

## 2025-03-04 RX ORDER — ACETAMINOPHEN 325 MG/1
650 TABLET ORAL ONCE
OUTPATIENT
Start: 2025-04-01

## 2025-03-04 RX ADMIN — Medication 20 G: at 09:25

## 2025-03-04 RX ADMIN — DIPHENHYDRAMINE HYDROCHLORIDE 12.5 MG: 50 INJECTION, SOLUTION INTRAMUSCULAR; INTRAVENOUS at 08:47

## 2025-03-04 RX ADMIN — DEXAMETHASONE SODIUM PHOSPHATE 4 MG: 10 INJECTION, SOLUTION INTRAMUSCULAR; INTRAVENOUS at 08:25

## 2025-03-04 RX ADMIN — ACETAMINOPHEN 650 MG: 325 TABLET ORAL at 08:28

## 2025-03-04 RX ADMIN — SODIUM CHLORIDE 20 ML/HR: 0.9 INJECTION, SOLUTION INTRAVENOUS at 08:24

## 2025-03-04 NOTE — PROGRESS NOTES
Cayetano Lucero  tolerated IVIG well with no complications.      Cayetano Lucero is aware of future appt on Tuesday Mar 18, 2025 8:00 AM.     AVS declined by Cayetano Lucero.

## 2025-03-10 ENCOUNTER — DOCUMENTATION (OUTPATIENT)
Dept: HEMATOLOGY ONCOLOGY | Facility: CLINIC | Age: 70
End: 2025-03-10

## 2025-03-10 NOTE — PROGRESS NOTES
Writer submitted PT  free drug re-enrollment application for his Imbruvica medication to [employer] via fax on 3/10/25.            Dutch Houser  Phone:[phone]  Email:[email or web address]

## 2025-03-11 ENCOUNTER — OFFICE VISIT (OUTPATIENT)
Dept: URGENT CARE | Facility: CLINIC | Age: 70
End: 2025-03-11
Payer: MEDICARE

## 2025-03-11 VITALS
OXYGEN SATURATION: 95 % | TEMPERATURE: 97.2 F | HEART RATE: 95 BPM | DIASTOLIC BLOOD PRESSURE: 76 MMHG | RESPIRATION RATE: 18 BRPM | SYSTOLIC BLOOD PRESSURE: 124 MMHG

## 2025-03-11 DIAGNOSIS — C91.10 CHRONIC LYMPHATIC LEUKEMIA (HCC): ICD-10-CM

## 2025-03-11 DIAGNOSIS — R68.89 FLU-LIKE SYMPTOMS: ICD-10-CM

## 2025-03-11 DIAGNOSIS — R05.9 COUGH, UNSPECIFIED TYPE: Primary | ICD-10-CM

## 2025-03-11 PROCEDURE — 87636 SARSCOV2 & INF A&B AMP PRB: CPT | Performed by: NURSE PRACTITIONER

## 2025-03-11 PROCEDURE — 99213 OFFICE O/P EST LOW 20 MIN: CPT | Performed by: NURSE PRACTITIONER

## 2025-03-11 PROCEDURE — G0463 HOSPITAL OUTPT CLINIC VISIT: HCPCS | Performed by: NURSE PRACTITIONER

## 2025-03-11 RX ORDER — PREDNISONE 20 MG/1
20 TABLET ORAL 2 TIMES DAILY WITH MEALS
Qty: 10 TABLET | Refills: 0 | Status: SHIPPED | OUTPATIENT
Start: 2025-03-11 | End: 2025-03-16

## 2025-03-11 NOTE — TELEPHONE ENCOUNTER
Jesse told patient that he needed a new script sent.   Reason for call:   [x] Refill   [] Prior Auth  [] Other:     Office:   [] PCP/Provider -   [x] Specialty/Provider - HEMATOLOGY & ONCOLOGY POD  Authorized By: Gideon Meija MD      Medication: Ibrutinib 140 MG TABS    Dose/Frequency: Take 140 mg by mouth daily    Quantity: 28      Pharmacy: [facility] [facility]    Local Pharmacy   Does the patient have enough for 3 days?   [] Yes   [] No - Send as HP to POD    Mail Away Pharmacy   Does the patient have enough for 10 days?   [x] Yes   [] No - Send as HP to POD

## 2025-03-11 NOTE — PROGRESS NOTES
[facility]        NAME: Cayetano Lucero is a 69 y.o. male  : 1955    MRN: [id number]  DATE: 2025  TIME: 11:55 AM    Assessment and Plan   Cough, unspecified type [R05.9]  1. Cough, unspecified type  Covid/Flu- Office Collect Normal      2. Flu-like symptoms          Due to medical history - will start course of prednisone and augmentin   Continue otc medications as needed for symptom management   Pt and wife in agreement with plan of care    Patient Instructions     Follow up with PCP in 3-5 days.  Proceed to  ER if symptoms worsen.    Chief Complaint     Chief Complaint   Patient presents with    Cold Like Symptoms     Coming in with chills, aching, throat pain, headaches, coughing, congestion in his chest. Started Saturday. Fevers present- highest 103. Taking Tylenol for fevers. Denies COVID testing, does not wish to be tested in office.          History of Present Illness   Cayetano Lucero presents to the clinic c/o    Cold Like Symptoms (Coming in with chills, aching, throat pain, headaches, coughing, congestion in his chest. Started Saturday. Fevers present- highest 103. Taking Tylenol for fevers. Denies COVID testing, does not wish to be tested in office. )  Productive cough   H/o CLL, DM, and COPD         Review of Systems   Review of Systems   All other systems reviewed and are negative.        Current Medications     Long-Term Medications   Medication Sig Dispense Refill    acyclovir (ZOVIRAX) 400 MG tablet TAKE 1 TABLET TWICE A DAY 60 tablet 11    amLODIPine (NORVASC) 10 mg tablet TAKE 1 TABLET DAILY 90 tablet 1    Blood Glucose Monitoring Suppl (FreeStyle Freedom Lite) w/Device KIT Use 3 (three) times a day 1 kit 0    citalopram (CeleXA) 40 mg tablet TAKE 1 TABLET DAILY 30 tablet 5    Diclofenac Sodium (VOLTAREN) 1 % Apply 4 g topically 4 (four) times a day 100 g 0    fenofibrate (TRICOR) 145 mg tablet TAKE 1 TABLET DAILY 60 tablet 5    Fluticasone-Salmeterol (Advair Diskus)  250-50 mcg/dose inhaler Inhale 1 puff 2 (two) times a day Rinse mouth after use. 60 blister 0    gabapentin (NEURONTIN) 600 MG tablet TAKE 1 TABLET DAILY 90 tablet 1    Injection Device for Insulin (CeQur Simplicity 2U) JOE Use 1 patch every 3 days, disp 1 box of 10 patches for 30 days supply 1 each 3    Injection Device for Insulin (CeQur Simplicity ) MISC Use to insert simplicity device. 1 each 1    insulin degludec (Tresiba FlexTouch) 100 units/mL injection pen Inject 18 Units under the skin daily at bedtime 15 mL 1    insulin lispro (Admelog SoloStar) 100 units/mL injection pen 12-14 units before meals 45 mL 1    Insulin Pen Needle (BD Pen Needle Inez U/F) 32G X 4 MM MISC Use 3 (three) times a day (Patient not taking: Reported on 1/6/2025) 300 each 1    labetalol (NORMODYNE) 100 mg tablet Take 1 tablet (100 mg total) by mouth 2 (two) times a day (Patient not taking: Reported on 1/29/2025) 180 tablet 3    Lancets (FREESTYLE) lancets Use as instructed--test 2 times a day    E 11.9 100 each 0    Lancets (freestyle) lancets TEST BLOOD SUGAR 3 TIMES A  each 1    LORazepam (ATIVAN) 0.5 mg tablet Take 1 tablet (0.5 mg total) by mouth daily as needed for anxiety 30 tablet 0    losartan (COZAAR) 100 MG tablet TAKE 1 TABLET DAILY 60 tablet 5    mometasone-formoterol (Dulera) 200-5 MCG/ACT inhaler Inhale 2 puffs 2 (two) times a day Rinse mouth after use. 13 g 1    multivitamin (THERAGRAN) TABS Take 1 tablet by mouth daily      naloxone (NARCAN) 4 mg/0.1 mL nasal spray Administer 1 spray into a nostril. If no response after 2-3 minutes, give another dose in the other nostril using a new spray. 1 each 1    obinutuzumab (GAZYVA) 1000 mg/40 mL SOLN Infuse into a venous catheter      Tresiba FlexTouch 100 units/mL injection pen INJECT 12 UNITS SUB-Q DAILY AS DIRECTED. 15 mL 5       Current Allergies     Allergies as of 03/11/2025 - Reviewed 03/11/2025   Allergen Reaction Noted    Heparin Other (See Comments)  06/06/2012    Macrolides and ketolides Other (See Comments) 01/13/2016    Simvastatin Myalgia 12/17/2021            The following portions of the patient's history were reviewed and updated as appropriate: allergies, current medications, past family history, past medical history, past social history, past surgical history and problem list.    Objective   /76   Pulse 95   Temp (!) 97.2 °F (36.2 °C) (Tympanic)   Resp 18   SpO2 95%        Physical Exam     Physical Exam  Vitals and nursing note reviewed.   Constitutional:       Appearance: Normal appearance. He is well-developed.   HENT:      Head: Normocephalic and atraumatic.      Right Ear: Tympanic membrane, ear canal and external ear normal.      Left Ear: Tympanic membrane, ear canal and external ear normal.      Nose: Congestion present.      Mouth/Throat:      Mouth: Mucous membranes are moist.   Eyes:      General: Lids are normal.      Conjunctiva/sclera: Conjunctivae normal.      Pupils: Pupils are equal, round, and reactive to light.   Cardiovascular:      Rate and Rhythm: Normal rate and regular rhythm.      Pulses: Normal pulses.      Heart sounds: Normal heart sounds, S1 normal and S2 normal.   Pulmonary:      Effort: Pulmonary effort is normal.      Breath sounds: Rhonchi present.   Musculoskeletal:      Cervical back: Normal range of motion.   Skin:     General: Skin is warm and dry.   Neurological:      Mental Status: He is alert.   Psychiatric:         Speech: Speech normal.         Behavior: Behavior normal.         Thought Content: Thought content normal.         Judgment: Judgment normal.

## 2025-03-12 ENCOUNTER — TELEPHONE (OUTPATIENT)
Age: 70
End: 2025-03-12

## 2025-03-12 LAB
FLUAV RNA RESP QL NAA+PROBE: NEGATIVE
FLUBV RNA RESP QL NAA+PROBE: NEGATIVE
SARS-COV-2 RNA RESP QL NAA+PROBE: NEGATIVE

## 2025-03-12 NOTE — TELEPHONE ENCOUNTER
Sivan from pt assistance program at [phone] is calling to get the pt re [facility] but they would need proof of 1 denial or proof one of the funds are closed. This can be faxed to [phone]. Questions please call Sivan. Thank you

## 2025-03-13 ENCOUNTER — DOCUMENTATION (OUTPATIENT)
Dept: HEMATOLOGY ONCOLOGY | Facility: CLINIC | Age: 70
End: 2025-03-13

## 2025-03-13 DIAGNOSIS — C91.10 CHRONIC LYMPHATIC LEUKEMIA (HCC): ICD-10-CM

## 2025-03-13 DIAGNOSIS — C91.10 CLL (CHRONIC LYMPHOCYTIC LEUKEMIA) (HCC): Primary | ICD-10-CM

## 2025-03-13 RX ORDER — SODIUM CHLORIDE 9 MG/ML
20 INJECTION, SOLUTION INTRAVENOUS ONCE
OUTPATIENT
Start: 2025-03-18

## 2025-03-13 RX ORDER — ACETAMINOPHEN 325 MG/1
650 TABLET ORAL ONCE
OUTPATIENT
Start: 2025-03-18

## 2025-03-13 NOTE — PROGRESS NOTES
PT obtained bella funding through [facility]. Details below                Dutch Houser  Phone:[phone]  Email:[email or web address]

## 2025-03-14 ENCOUNTER — TELEPHONE (OUTPATIENT)
Dept: HEMATOLOGY ONCOLOGY | Facility: CLINIC | Age: 70
End: 2025-03-14

## 2025-03-14 ENCOUNTER — DOCUMENTATION (OUTPATIENT)
Dept: HEMATOLOGY ONCOLOGY | Facility: CLINIC | Age: 70
End: 2025-03-14

## 2025-03-14 NOTE — PROGRESS NOTES
PT obtained additional bella funding from [facility] to use in effort to appease [employer] for FD re-enrollment. Details below                    Dutch Houser  Phone:[phone]  Email:[email or web address]

## 2025-03-14 NOTE — TELEPHONE ENCOUNTER
Writer spoke with Shani(previous CM for PT on initial enrollment) from [employer] on 3/14/25 to speak on the PT re-enrollment application for his Imbruvica medication. Shani informed writer that her department is no longer handling Imbruvica but did provide a direct number ([employer]-[phone]) for the writer to outreach. Shani suggested writer ask for an  who should be able to see the timeline of the PT current enrollment which will highlight the complexities of the case. Writer was very grateful for the guidance and stated he would follow her advice. Shani encouraged writer to keep her contact information for future use. Writer exchange contact information for future use.            Dutch Houser  Phone:[phone]  Email:[email or web address]

## 2025-03-14 NOTE — TELEPHONE ENCOUNTER
Luis Finnegan called writer on 3/14/25 to review PT application for FD program of his [employer]mb[employer]. Sivan informed writer that denial/closing funding must be provided to continue with the PT application for review. Writer informed Sivan that the PT insurance will not accpet any bella/foundation funding if it does not cover at least a 1 month fill. Writer stated the co-pay for the PT [employer] is upwards of $17,000 for a 1 month supply with no cap. Writer stated 2 foundations are currently open and have been secured for PT, but due to complexity of insurance, are not usable. Writer inquired what he needs to do or provide to Luis, in this case, to get PT approved for re-enrollment of his Imbruv[employer] via FD program. Sivan informed writer to obtain both open grants, provide acceptance letters (with bella details), and documentation supporting the extremely high co-pay cost for the PT. Sivan suggested writer include a letter with supporting documents to submit to [employer] for further review. Writer will do so once he can obtain test claim detailing high expense to PT.             Dutch Houser  Phone:[phone]  Email:[email or web address]

## 2025-03-17 ENCOUNTER — APPOINTMENT (EMERGENCY)
Dept: RADIOLOGY | Facility: HOSPITAL | Age: 70
End: 2025-03-17
Payer: MEDICARE

## 2025-03-17 ENCOUNTER — HOSPITAL ENCOUNTER (EMERGENCY)
Facility: HOSPITAL | Age: 70
Discharge: HOME | End: 2025-03-17
Attending: EMERGENCY MEDICINE | Admitting: EMERGENCY MEDICINE
Payer: MEDICARE

## 2025-03-17 ENCOUNTER — TELEPHONE (OUTPATIENT)
Dept: HEMATOLOGY ONCOLOGY | Facility: CLINIC | Age: 70
End: 2025-03-17

## 2025-03-17 VITALS
RESPIRATION RATE: 26 BRPM | TEMPERATURE: 97.4 F | WEIGHT: 217.37 LBS | SYSTOLIC BLOOD PRESSURE: 135 MMHG | DIASTOLIC BLOOD PRESSURE: 74 MMHG | BODY MASS INDEX: 28.68 KG/M2 | OXYGEN SATURATION: 92 % | HEART RATE: 84 BPM

## 2025-03-17 DIAGNOSIS — D80.1 HYPOGAMMAGLOBULINEMIA, ACQUIRED (HCC): ICD-10-CM

## 2025-03-17 DIAGNOSIS — C91.10 CLL (CHRONIC LYMPHOCYTIC LEUKEMIA) (HCC): Primary | ICD-10-CM

## 2025-03-17 DIAGNOSIS — R76.8 LOW SERUM IGG FOR AGE: ICD-10-CM

## 2025-03-17 DIAGNOSIS — R68.89 FLU-LIKE SYMPTOMS: Primary | ICD-10-CM

## 2025-03-17 DIAGNOSIS — R05.9 COUGH: ICD-10-CM

## 2025-03-17 LAB
2HR DELTA HS TROPONIN: -1 NG/L
ALBUMIN SERPL BCG-MCNC: 3.9 G/DL (ref 3.5–5)
ALP SERPL-CCNC: 42 U/L (ref 34–104)
ALT SERPL W P-5'-P-CCNC: 29 U/L (ref 7–52)
ANION GAP SERPL CALCULATED.3IONS-SCNC: 8 MMOL/L (ref 4–13)
AST SERPL W P-5'-P-CCNC: 29 U/L (ref 13–39)
ATRIAL RATE: 80 BPM
BASOPHILS # BLD MANUAL: 0.17 THOUSAND/UL (ref 0–0.1)
BASOPHILS NFR MAR MANUAL: 2 % (ref 0–1)
BILIRUB DIRECT SERPL-MCNC: 0.14 MG/DL (ref 0–0.2)
BILIRUB SERPL-MCNC: 0.62 MG/DL (ref 0.2–1)
BNP SERPL-MCNC: 147 PG/ML (ref 0–100)
BUN SERPL-MCNC: 18 MG/DL (ref 5–25)
CALCIUM SERPL-MCNC: 9.6 MG/DL (ref 8.4–10.2)
CARDIAC TROPONIN I PNL SERPL HS: 5 NG/L (ref ?–50)
CARDIAC TROPONIN I PNL SERPL HS: 6 NG/L (ref ?–50)
CHLORIDE SERPL-SCNC: 100 MMOL/L (ref 96–108)
CO2 SERPL-SCNC: 27 MMOL/L (ref 21–32)
CREAT SERPL-MCNC: 1.01 MG/DL (ref 0.6–1.3)
EOSINOPHIL # BLD MANUAL: 0.08 THOUSAND/UL (ref 0–0.4)
EOSINOPHIL NFR BLD MANUAL: 1 % (ref 0–6)
ERYTHROCYTE [DISTWIDTH] IN BLOOD BY AUTOMATED COUNT: 13.4 % (ref 11.6–15.1)
FLUAV RNA RESP QL NAA+PROBE: NEGATIVE
FLUBV RNA RESP QL NAA+PROBE: NEGATIVE
GFR SERPL CREATININE-BSD FRML MDRD: 75 ML/MIN/1.73SQ M
GIANT PLATELETS BLD QL SMEAR: PRESENT
GLUCOSE SERPL-MCNC: 272 MG/DL (ref 65–140)
HCT VFR BLD AUTO: 43.7 % (ref 36.5–49.3)
HGB BLD-MCNC: 14.7 G/DL (ref 12–17)
LYMPHOCYTES # BLD AUTO: 0.58 THOUSAND/UL (ref 0.6–4.47)
LYMPHOCYTES # BLD AUTO: 7 % (ref 14–44)
MCH RBC QN AUTO: 30.8 PG (ref 26.8–34.3)
MCHC RBC AUTO-ENTMCNC: 33.6 G/DL (ref 31.4–37.4)
MCV RBC AUTO: 92 FL (ref 82–98)
METAMYELOCYTE ABSOLUTE CT: 0.08 THOUSAND/UL (ref 0–0.1)
METAMYELOCYTES NFR BLD MANUAL: 1 % (ref 0–1)
MONOCYTES # BLD AUTO: 0.58 THOUSAND/UL (ref 0–1.22)
MONOCYTES NFR BLD: 7 % (ref 4–12)
NEUTROPHILS # BLD MANUAL: 6.85 THOUSAND/UL (ref 1.85–7.62)
NEUTS SEG NFR BLD AUTO: 82 % (ref 43–75)
P AXIS: 19 DEGREES
PLATELET # BLD AUTO: 243 THOUSANDS/UL (ref 149–390)
PLATELET BLD QL SMEAR: ADEQUATE
PLATELET CLUMP BLD QL SMEAR: PRESENT
PMV BLD AUTO: 11.1 FL (ref 8.9–12.7)
POTASSIUM SERPL-SCNC: 4 MMOL/L (ref 3.5–5.3)
PR INTERVAL: 146 MS
PROT SERPL-MCNC: 6.2 G/DL (ref 6.4–8.4)
QRS AXIS: -37 DEGREES
QRSD INTERVAL: 86 MS
QT INTERVAL: 366 MS
QTC INTERVAL: 422 MS
RBC # BLD AUTO: 4.77 MILLION/UL (ref 3.88–5.62)
RBC MORPH BLD: NORMAL
RSV RNA RESP QL NAA+PROBE: NEGATIVE
SARS-COV-2 RNA RESP QL NAA+PROBE: NEGATIVE
SODIUM SERPL-SCNC: 135 MMOL/L (ref 135–147)
T WAVE AXIS: -1 DEGREES
VENTRICULAR RATE: 80 BPM
WBC # BLD AUTO: 8.35 THOUSAND/UL (ref 4.31–10.16)

## 2025-03-17 PROCEDURE — 99285 EMERGENCY DEPT VISIT HI MDM: CPT | Performed by: EMERGENCY MEDICINE

## 2025-03-17 PROCEDURE — 85027 COMPLETE CBC AUTOMATED: CPT | Performed by: EMERGENCY MEDICINE

## 2025-03-17 PROCEDURE — 93010 ELECTROCARDIOGRAM REPORT: CPT | Performed by: STUDENT IN AN ORGANIZED HEALTH CARE EDUCATION/TRAINING PROGRAM

## 2025-03-17 PROCEDURE — 94640 AIRWAY INHALATION TREATMENT: CPT

## 2025-03-17 PROCEDURE — 99285 EMERGENCY DEPT VISIT HI MDM: CPT

## 2025-03-17 PROCEDURE — 0241U HB NFCT DS VIR RESP RNA 4 TRGT: CPT | Performed by: EMERGENCY MEDICINE

## 2025-03-17 PROCEDURE — 93005 ELECTROCARDIOGRAM TRACING: CPT

## 2025-03-17 PROCEDURE — 85007 BL SMEAR W/DIFF WBC COUNT: CPT | Performed by: EMERGENCY MEDICINE

## 2025-03-17 PROCEDURE — 80048 BASIC METABOLIC PNL TOTAL CA: CPT | Performed by: EMERGENCY MEDICINE

## 2025-03-17 PROCEDURE — 96361 HYDRATE IV INFUSION ADD-ON: CPT

## 2025-03-17 PROCEDURE — 36415 COLL VENOUS BLD VENIPUNCTURE: CPT | Performed by: EMERGENCY MEDICINE

## 2025-03-17 PROCEDURE — 83880 ASSAY OF NATRIURETIC PEPTIDE: CPT | Performed by: EMERGENCY MEDICINE

## 2025-03-17 PROCEDURE — 96360 HYDRATION IV INFUSION INIT: CPT

## 2025-03-17 PROCEDURE — 71046 X-RAY EXAM CHEST 2 VIEWS: CPT

## 2025-03-17 PROCEDURE — 84484 ASSAY OF TROPONIN QUANT: CPT | Performed by: EMERGENCY MEDICINE

## 2025-03-17 PROCEDURE — 80076 HEPATIC FUNCTION PANEL: CPT | Performed by: EMERGENCY MEDICINE

## 2025-03-17 RX ORDER — DOXYCYCLINE 100 MG/1
100 CAPSULE ORAL 2 TIMES DAILY
Qty: 14 CAPSULE | Refills: 0 | Status: SHIPPED | OUTPATIENT
Start: 2025-03-17 | End: 2025-03-24

## 2025-03-17 RX ORDER — BENZONATATE 100 MG/1
100 CAPSULE ORAL EVERY 8 HOURS
Qty: 21 CAPSULE | Refills: 0 | Status: SHIPPED | OUTPATIENT
Start: 2025-03-17

## 2025-03-17 RX ORDER — DOXYCYCLINE 100 MG/1
100 CAPSULE ORAL ONCE
Status: COMPLETED | OUTPATIENT
Start: 2025-03-17 | End: 2025-03-17

## 2025-03-17 RX ORDER — ALBUTEROL SULFATE 0.83 MG/ML
5 SOLUTION RESPIRATORY (INHALATION) ONCE
Status: COMPLETED | OUTPATIENT
Start: 2025-03-17 | End: 2025-03-17

## 2025-03-17 RX ADMIN — IPRATROPIUM BROMIDE 0.5 MG: 0.5 SOLUTION RESPIRATORY (INHALATION) at 13:46

## 2025-03-17 RX ADMIN — ALBUTEROL SULFATE 5 MG: 2.5 SOLUTION RESPIRATORY (INHALATION) at 13:46

## 2025-03-17 RX ADMIN — DOXYCYCLINE HYCLATE 100 MG: 100 CAPSULE ORAL at 15:58

## 2025-03-17 RX ADMIN — SODIUM CHLORIDE 500 ML: 0.9 INJECTION, SOLUTION INTRAVENOUS at 14:06

## 2025-03-17 NOTE — TELEPHONE ENCOUNTER
----- Message from Jenifer LAZAR sent at 3/17/2025  8:44 AM EDT -----  Regarding: cx appt  Pt cancelled chemo appt for tomorrow d/t having the flu

## 2025-03-17 NOTE — ED PROVIDER NOTES
Time reflects when diagnosis was documented in both MDM as applicable and the Disposition within this note       Time User Action Codes Description Comment    3/17/2025  4:35 PM Rico Hebert Add [R68.89] Flu-like symptoms     3/17/2025  4:35 PM Rico Hebert Add [R05.9] Cough           ED Disposition       ED Disposition   Discharge    Condition   Stable    Date/Time   Mon Mar 17, 2025  4:35 PM    Comment   Cayetano Lucero discharge to home/self care.                   Assessment & Plan       Medical Decision Making  1. Flu-symptoms - Appears flu symptoms seem to be improving, however he continues with cough and congestion, possible COPD. Will order ECG and troponin to rule out acute MI, CBC for leukocytosis and anemia, metabolic panel for electrolyte abnormalities and dehydration, BNP for possible CHF, COVID/Flu/RSV, CXR to assess possible PNA. Will treat with a breathing treatment.     Problems Addressed:  Cough: acute illness or injury  Flu-like symptoms: acute illness or injury    Amount and/or Complexity of Data Reviewed  Labs: ordered.  Radiology: ordered.  ECG/medicine tests: ordered and independent interpretation performed. Decision-making details documented in ED Course.    Risk  Prescription drug management.        ED Course as of 03/17/25 1901   Mon Mar 17, 2025   1433 Patient has had some improvement with breathing treatment.    1638 ECG evaluated by myself, interpretation included in procedure section of note.        Medications   sodium chloride 0.9 % bolus 500 mL (0 mL Intravenous Stopped 3/17/25 1558)   albuterol inhalation solution 5 mg (5 mg Nebulization Given 3/17/25 1346)   ipratropium (ATROVENT) 0.02 % inhalation solution 0.5 mg (0.5 mg Nebulization Given 3/17/25 1346)   doxycycline hyclate (VIBRAMYCIN) capsule 100 mg (100 mg Oral Given 3/17/25 1558)       ED Risk Strat Scores                                                History of Present Illness       Chief Complaint   Patient presents  with    Flu Symptoms     Told by urgent care he has the flu. Given prednisone but had to cut down d/t not sleeping. 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    Dyslipidemia     Edema extremities     Esophageal ulcer     H/O blood clots     Hemolytic anemia (HCC)     Hiatal hernia     History of TIA (transient ischemic attack)     Hyperlipidemia     Hypertension     Listeria infection 2018    Multiple gastric ulcers     Myocardial infarction (HCC) 2004    acute    Neutropenia (HCC)     Obese     Recurrent sinusitis     Thrombocytopenia (HCC)     Vertigo       Past Surgical History:   Procedure Laterality Date    ARTHROSCOPIC REPAIR ACL      lt knee    CARDIAC SURGERY      cardiac cath with stent    FOOT SURGERY      IR PORT CHECK  5/17/2019    KNEE ARTHROSCOPY      OTHER SURGICAL HISTORY      cardiac cath lesion 1, 1st adjunct treat device: stent    PORTACATH PLACEMENT      [place] ESOPHAGOGASTRODUODENOSCOPY TRANSORAL DIAGNOSTIC N/A 10/5/2017    Procedure: ESOPHAGOGASTRODUODENOSCOPY (EGD) with bx;  Surgeon: Winifred Hansen DO;  Location: [facility];  Service: Gastroenterology    [place] ESOPHAGOGASTRODUODENOSCOPY TRANSORAL DIAGNOSTIC N/A 3/8/2018    Procedure: ESOPHAGOGASTRODUODENOSCOPY (EGD) with biopsy;  Surgeon: Winifred Hansen DO;  Location: [facility];  Service: Gastroenterology    [place] ESOPHAGOGASTRODUODENOSCOPY TRANSORAL DIAGNOSTIC N/A 6/20/2018    Procedure: ESOPHAGOGASTRODUODENOSCOPY (EGD) with Dilation;  Surgeon: Winifred Hansen DO;  Location: [facility];  Service: Gastroenterology    [place] ESOPHAGOGASTRODUODENOSCOPY TRANSORAL DIAGNOSTIC  N/A 10/18/2018    Procedure: ESOPHAGOGASTRODUODENOSCOPY (EGD) with dilation;  Surgeon: Edu Mejía MD;  Location: [facility];  Service: Gastroenterology    IN ESOPHAGOGASTRODUODENOSCOPY TRANSORAL DIAGNOSTIC N/A 2024    Procedure: ESOPHAGOGASTRODUODENOSCOPY (EGD);  Surgeon: Immanuel Gonsales MD;  Location: [facility];  Service: Thoracic    IN ESOPHAGOSCOPY FLEXIBLE TRANSORAL WITH BIOPSY N/A 2016    Procedure: ESOPHAGOGASTRODUODENOSCOPY (EGD); ESOPHAGEAL DILATION; ESOPHAGEAL BIOPSY;  Surgeon: Abdi Holcomb MD;  Location: [facility];  Service: Thoracic    IN LAPS RPR PARAESPHGL HRNA INCL FUNDPLSTY W/O MESH N/A 2024    Procedure: REPAIR HERNIA PARAESOPHAGEAL LAPAROSCOPIC W ROBOTICS TYPE 3. PARTIAL FUNDOPLICATION. MESH.;  Surgeon: Immanuel Gonsales MD;  Location: [facility];  Service: Thoracic    TONSILLECTOMY      UPPER GASTROINTESTINAL ENDOSCOPY      x 6      Family History   Problem Relation Age of Onset    Leukemia Mother         chronic    Colon polyps Mother         sidmoid    Parkinsonism Father       Social History     Tobacco Use    Smoking status: Former     Current packs/day: 0.00     Average packs/day: 2.0 packs/day for 33.0 years (66.0 ttl pk-yrs)     Types: Cigarettes     Start date:      Quit date:      Years since quittin.2     Passive exposure: Past    Smokeless tobacco: Never   Vaping Use    Vaping status: Never Used   Substance Use Topics    Alcohol use: Yes     Alcohol/week: 2.0 standard drinks of alcohol     Types: 2 Cans of beer per week     Comment: social use as per [employer]    Drug use: Never      E-Cigarette/Vaping    E-Cigarette Use Never User       E-Cigarette/Vaping Substances    Nicotine No     THC No     CBD No     Flavoring No     Other No     Unknown No       I have reviewed and agree with the history as documented.     70 YO male presents with cough, congestion, shortness of breath. States this has been present for the last 9 days, began with fevers,  myalgias. Patient was evaluated in an urgent care, considered to have flu, he was given prednisone and Augmentin. Patient states he did take the Augmentin, only had a few doses of prednisone as it was keeping him up at night. Patient states fevers have seemed to resolve but he continues to have a wet cough with nasal congestion, nasal drainage seems to be worse overnight. He has albuterol but has not taken this. Patient does have CLL, currently being treated for this, he additionally has COPD. Pt denies CP/N/V/D/C, no dysuria, burning on urination or blood in urine.       History provided by:  Patient   used: No        Review of Systems   Constitutional:  Negative for fever.   HENT:  Positive for congestion. Negative for dental problem.    Eyes:  Negative for visual disturbance.   Respiratory:  Positive for cough and shortness of breath.    Cardiovascular:  Negative for chest pain.   Gastrointestinal:  Negative for abdominal pain, nausea and vomiting.   Genitourinary:  Negative for dysuria and frequency.   Musculoskeletal:  Negative for neck pain and neck stiffness.   Skin:  Negative for rash.   Neurological:  Negative for dizziness, weakness and light-headedness.   Psychiatric/Behavioral:  Negative for agitation, behavioral problems and confusion.    All other systems reviewed and are negative.          Objective       ED Triage Vitals [03/17/25 1319]   Temperature Pulse Blood Pressure Respirations SpO2 Patient Position - Orthostatic VS   (!) 97.4 °F (36.3 °C) 96 154/89 20 97 % Sitting      Temp Source Heart Rate Source BP Location FiO2 (%) Pain Score    Oral Monitor Right arm -- --      Vitals      Date and Time Temp Pulse SpO2 Resp BP Pain Score FACES Pain Rating User   03/17/25 1500 -- 84 92 % 26 135/74 -- -- EC   03/17/25 1429 -- 94 96 % 20 124/74 -- -- EC   03/17/25 1319 97.4 °F (36.3 °C) 96 97 % 20 154/89 -- -- HW            Physical Exam  Vitals and nursing note reviewed.    Constitutional:       Appearance: He is well-developed.   HENT:      Head: Normocephalic and atraumatic.   Eyes:      Extraocular Movements: Extraocular movements intact.   Cardiovascular:      Rate and Rhythm: Normal rate.   Pulmonary:      Effort: Pulmonary effort is normal. No respiratory distress.      Breath sounds: Rhonchi present.   Abdominal:      General: There is no distension.   Musculoskeletal:         General: Normal range of motion.      Cervical back: Normal range of motion.   Skin:     Findings: No rash.   Neurological:      Mental Status: He is alert and oriented to person, place, and time.   Psychiatric:         Behavior: Behavior normal.         Results Reviewed       Procedure Component Value Units Date/Time    HS Troponin I 2hr [[id number]]  (Normal) Collected: 03/17/25 1601    Lab Status: Final result Specimen: Blood from Arm, Left Updated: 03/17/25 1631     hs TnI 2hr 5 ng/L      Delta 2hr hsTnI -1 ng/L     FLU/RSV/COVID - if FLU/RSV clinically relevant (2hr TAT) [[id number]]  (Normal) Collected: 03/17/25 1426    Lab Status: Final result Specimen: Nares from Nose Updated: 03/17/25 1534     SARS-CoV-2 Negative     INFLUENZA A PCR Negative     INFLUENZA B PCR Negative     RSV PCR Negative    Narrative:      This test has been performed using the CoV-2/Flu/RSV plus assay on the [employer] GeneXpert platform. This test has been validated by the  and verified by the performing laboratory.     This test is designed to amplify and detect the following: nucleocapsid (N), envelope (E), and RNA-dependent RNA polymerase (RdRP) genes of the SARS-CoV-2 genome; matrix (M), basic polymerase (PB2), and acidic protein (PA) segments of the influenza A genome; matrix (M) and non-structural protein (NS) segments of the influenza B genome, and the nucleocapsid genes of RSV A and RSV B.     Positive results are indicative of the presence of Flu A, Flu B, RSV, and/or SARS-CoV-2 RNA. Positive results for  SARS-CoV-2 or suspected novel influenza should be reported to state, local, or federal health departments according to local reporting requirements.      All results should be assessed in conjunction with clinical presentation and other laboratory markers for clinical management.     FOR PEDIATRIC PATIENTS - copy/paste COVID Guidelines URL to browser: [email or web address]       CBC and differential [[id number]]  (Normal) Collected: 03/17/25 1402    Lab Status: Final result Specimen: Blood from Arm, Left Updated: 03/17/25 1530     WBC 8.35 Thousand/uL      RBC 4.77 Million/uL      Hemoglobin 14.7 g/dL      Hematocrit 43.7 %      MCV 92 fL      MCH 30.8 pg      MCHC 33.6 g/dL      RDW 13.4 %      MPV 11.1 fL      Platelets 243 Thousands/uL     Narrative:      This is an appended report.  These results have been appended to a previously verified report.    Manual Differential(PHLEBS Do Not Order) [[id number]]  (Abnormal) Collected: 03/17/25 1402    Lab Status: Final result Specimen: Blood from Arm, Left Updated: 03/17/25 1516     Segmented % 82 %      Lymphocytes % 7 %      Monocytes % 7 %      Eosinophils % 1 %      Basophils % 2 %      Metamyelocytes % 1 %      Absolute Neutrophils 6.85 Thousand/uL      Absolute Lymphocytes 0.58 Thousand/uL      Absolute Monocytes 0.58 Thousand/uL      Absolute Eosinophils 0.08 Thousand/uL      Absolute Basophils 0.17 Thousand/uL      Absolute Metamyelocytes 0.08 Thousand/uL      Total Counted --     RBC Morphology Normal     Platelet Estimate Adequate     Clumped Platelets Present     Giant PLTs Present    HS Troponin 0hr (reflex protocol) [[id number]]  (Normal) Collected: 03/17/25 1402    Lab Status: Final result Specimen: Blood from Arm, Left Updated: 03/17/25 1443     hs TnI 0hr 6 ng/L     B-Type Natriuretic Peptide(BNP) [[id number]]  (Abnormal) Collected: 03/17/25 1402    Lab Status: Final result Specimen: Blood from Arm, Left  Updated: 03/17/25 1442      pg/mL     Basic metabolic panel [[id number]]  (Abnormal) Collected: 03/17/25 1402    Lab Status: Final result Specimen: Blood from Arm, Left Updated: 03/17/25 1436     Sodium 135 mmol/L      Potassium 4.0 mmol/L      Chloride 100 mmol/L      CO2 27 mmol/L      ANION GAP 8 mmol/L      BUN 18 mg/dL      Creatinine 1.01 mg/dL      Glucose 272 mg/dL      Calcium 9.6 mg/dL      eGFR 75 ml/min/1.73sq m     Narrative:      National Kidney Disease Foundation guidelines for Chronic Kidney Disease (CKD):     Stage 1 with normal or high GFR (GFR > 90 mL/min/1.73 square meters)    Stage 2 Mild CKD (GFR = 60-89 mL/min/1.73 square meters)    Stage 3A Moderate CKD (GFR = 45-59 mL/min/1.73 square meters)    Stage 3B Moderate CKD (GFR = 30-44 mL/min/1.73 square meters)    Stage 4 Severe CKD (GFR = 15-29 mL/min/1.73 square meters)    Stage 5 End Stage CKD (GFR <15 mL/min/1.73 square meters)  Note: GFR calculation is accurate only with a steady state creatinine    Hepatic function panel [[id number]]  (Abnormal) Collected: 03/17/25 1402    Lab Status: Final result Specimen: Blood from Arm, Left Updated: 03/17/25 1436     Total Bilirubin 0.62 mg/dL      Bilirubin, Direct 0.14 mg/dL      Alkaline Phosphatase 42 U/L      AST 29 U/L      ALT 29 U/L      Total Protein 6.2 g/dL      Albumin 3.9 g/dL             XR chest 2 views   Final Interpretation by Reji Hernández MD (03/17 1531)      No acute cardiopulmonary disease.            Workstation performed: [id number]             ECG 12 Lead Documentation Only    Date/Time: 3/17/2025 7:00 PM    Performed by: Rico Hebert MD  Authorized by: Rico Hebert MD    ECG reviewed by me, the ED Provider: yes    Patient location:  ED  Previous ECG:     Previous ECG:  Compared to current    Comparison ECG info:  4/23/2018  Interpretation:     Interpretation: normal    Rate:     ECG rate assessment: normal    Rhythm:     Rhythm: sinus rhythm    QRS:      QRS axis:  Left    QRS intervals:  Normal  Conduction:     Conduction: normal    ST segments:     ST segments:  Normal  T waves:     T waves: normal    Comments:      Inferior infarct (cited on or before 23-Apr-2018)  Anterior infarct (cited on or before 23-Apr-2018)        ED Medication and Procedure Management   Prior to Admission Medications   Prescriptions Last Dose Informant Patient Reported? Taking?   Blood Glucose Monitoring Suppl (FreeStyle Freedom Lite) w/Device KIT  Self No No   Sig: Use 3 (three) times a day   Continuous Blood Gluc  (FreeStyle Ashley 2 [place]) JOE  Self No No   Sig: Use to scan sensor premeals and at bedtime   Continuous Glucose Sensor (FreeStyle Ashley 2 Sensor) MISC  Self No No   Sig: Apply 1 sensor every 14 days. Use as directed with Freestyle Ashley 2 reader.   Diclofenac Sodium (VOLTAREN) 1 %  Self No No   Sig: Apply 4 g topically 4 (four) times a day   Fluticasone-Salmeterol (Advair Diskus) 250-50 mcg/dose inhaler  Self No No   Sig: Inhale 1 puff 2 (two) times a day Rinse mouth after use.   Ibrutinib 140 MG CAPS   No No   Sig: Take 1 capsule (140 mg total) by mouth daily   Injection Device for Insulin (CeQur Simplicity 2U) JOE  Self No No   Sig: Use 1 patch every 3 days, disp 1 box of 10 patches for 30 days supply   Injection Device for Insulin (CeQur Simplicity ) MISC  Self No No   Sig: Use to insert simplicity device.   Insulin Pen Needle (BD Pen Needle Inez U/F) 32G X 4 MM MISC  Self No No   Sig: Use 3 (three) times a day   Patient not taking: Reported on 1/6/2025   LORazepam (ATIVAN) 0.5 mg tablet  Self No No   Sig: Take 1 tablet (0.5 mg total) by mouth daily as needed for anxiety   Lancets (FREESTYLE) lancets  Self No No   Sig: Use as instructed--test 2 times a day    E 11.9   Lancets (freestyle) lancets  Self No No   Sig: TEST BLOOD SUGAR 3 TIMES A DAY   Tresiba FlexTouch 100 units/mL injection pen  Self No No   Sig: INJECT 12 UNITS SUB-Q DAILY AS DIRECTED.    Vonoprazan Fumarate 20 MG TABS  Self No No   Sig: Take 20 mg by mouth in the morning   acyclovir (ZOVIRAX) 400 MG tablet  Self No No   Sig: TAKE 1 TABLET TWICE A DAY   albuterol (2.5 mg/3 mL) 0.083 % nebulizer solution  Self No No   Sig: Take 3 mL (2.5 mg total) by nebulization every 6 (six) hours as needed for wheezing or shortness of breath   albuterol (Ventolin HFA) 90 mcg/act inhaler  Self No No   Sig: USE 2 INHALATIONS EVERY 6 HOURS AS NEEDED FOR WHEEZING   amLODIPine (NORVASC) 10 mg tablet  Self No No   Sig: TAKE 1 TABLET DAILY   amoxicillin-clavulanate (AUGMENTIN) 875-125 mg per tablet   No No   Sig: Take 1 tablet by mouth every 12 (twelve) hours for 7 days   aspirin 81 MG tablet  Self Yes No   Sig: Take 81 mg by mouth daily Every other day   benzonatate (TESSALON) 200 MG capsule  Self No No   Sig: TAKE 1 CAPSULE THREE TIMES A DAY AS NEEDED FOR COUGH   citalopram (CeleXA) 40 mg tablet  Self No No   Sig: TAKE 1 TABLET DAILY   fenofibrate (TRICOR) 145 mg tablet  Self No No   Sig: TAKE 1 TABLET DAILY   folic acid (FOLVITE) 1 mg tablet  Self Yes No   Sig: Take 800 mcg by mouth daily    gabapentin (NEURONTIN) 600 MG tablet  Self No No   Sig: TAKE 1 TABLET DAILY   glucose blood (FREESTYLE LITE) test strip  Self No No   Sig: Check blood sugar 3 times a day   insulin degludec (Tresiba FlexTouch) 100 units/mL injection pen  Self No No   Sig: Inject 18 Units under the skin daily at bedtime   insulin lispro (Admelog SoloStar) 100 units/mL injection pen  Self No No   Si-14 units before meals   labetalol (NORMODYNE) 100 mg tablet  Self No No   Sig: Take 1 tablet (100 mg total) by mouth 2 (two) times a day   Patient not taking: Reported on 2025   losartan (COZAAR) 100 MG tablet  Self No No   Sig: TAKE 1 TABLET DAILY   mometasone-formoterol (Dulera) 200-5 MCG/ACT inhaler  Self No No   Sig: Inhale 2 puffs 2 (two) times a day Rinse mouth after use.   multivitamin (THERAGRAN) TABS  Self Yes No   Sig: Take 1 tablet  by mouth daily   naloxone (NARCAN) 4 mg/0.1 mL nasal spray  Self No No   Sig: Administer 1 spray into a nostril. If no response after 2-3 minutes, give another dose in the other nostril using a new spray.   obinutuzumab (GAZYVA) 1000 mg/40 mL SOLN  Self Yes No   Sig: Infuse into a venous catheter   oxyCODONE (ROXICODONE) 10 MG TABS  Self No No   Sig: Take 1 tablet (10 mg total) by mouth every 6 (six) hours as needed for moderate pain Max Daily Amount: 40 mg   posaconazole (NOXAFIL) 40 mg/mL suspension  Self Yes No   Sig: Take by mouth daily   Patient not taking: Reported on 2/25/2025   predniSONE 20 mg tablet   No No   Sig: Take 1 tablet (20 mg total) by mouth 2 (two) times a day with meals for 5 days      Facility-Administered Medications Last Administration Doses Remaining   bupivacaine (MARCAINE) 0.25 % injection 1 mL 9/9/2024  2:30 PM    lidocaine (XYLOCAINE) 1 % injection 1 mL 9/9/2024  2:30 PM    triamcinolone acetonide (KENALOG-40) 40 mg/mL injection 20 mg 9/9/2024  2:30 PM         Discharge Medication List as of 3/17/2025  4:38 PM        START taking these medications    Details   !! benzonatate (TESSALON PERLES) 100 mg capsule Take 1 capsule (100 mg total) by mouth every 8 (eight) hours, Starting Mon 3/17/2025, Normal      doxycycline hyclate (VIBRAMYCIN) 100 mg capsule Take 1 capsule (100 mg total) by mouth 2 (two) times a day for 7 days, Starting Mon 3/17/2025, Until Mon 3/24/2025, Normal       !! - Potential duplicate medications found. Please discuss with provider.        CONTINUE these medications which have NOT CHANGED    Details   acyclovir (ZOVIRAX) 400 MG tablet TAKE 1 TABLET TWICE A DAY, Normal      albuterol (2.5 mg/3 mL) 0.083 % nebulizer solution Take 3 mL (2.5 mg total) by nebulization every 6 (six) hours as needed for wheezing or shortness of breath, Starting u 8/11/2022, Normal      albuterol (Ventolin HFA) 90 mcg/act inhaler USE 2 INHALATIONS EVERY 6 HOURS AS NEEDED FOR WHEEZING, Starting  Wed 9/11/2024, Normal      amLODIPine (NORVASC) 10 mg tablet TAKE 1 TABLET DAILY, Starting Wed 11/13/2024, Normal      amoxicillin-clavulanate (AUGMENTIN) 875-125 mg per tablet Take 1 tablet by mouth every 12 (twelve) hours for 7 days, Starting Tue 3/11/2025, Until Tue 3/18/2025, Normal      aspirin 81 MG tablet Take 81 mg by mouth daily Every other day, Historical Med      !! benzonatate (TESSALON) 200 MG capsule TAKE 1 CAPSULE THREE TIMES A DAY AS NEEDED FOR COUGH, Starting Tue 1/28/2025, Normal      Blood Glucose Monitoring Suppl (FreeStyle Freedom Lite) w/Device KIT Use 3 (three) times a day, Starting Tue 8/1/2023, Normal      citalopram (CeleXA) 40 mg tablet TAKE 1 TABLET DAILY, Normal      Continuous Blood Gluc  (FreeStyle Ashley 2 [place]) JOE Use to scan sensor premeals and at bedtime, Normal      Continuous Glucose Sensor (FreeStyle Ashley 2 Sensor) MISC Apply 1 sensor every 14 days. Use as directed with Freestyle Ashley 2 reader., Normal      Diclofenac Sodium (VOLTAREN) 1 % Apply 4 g topically 4 (four) times a day, Starting Fri 6/9/2023, Normal      fenofibrate (TRICOR) 145 mg tablet TAKE 1 TABLET DAILY, Normal      Fluticasone-Salmeterol (Advair Diskus) 250-50 mcg/dose inhaler Inhale 1 puff 2 (two) times a day Rinse mouth after use., Starting Mon 1/6/2025, Until Wed 2/5/2025, Normal      folic acid (FOLVITE) 1 mg tablet Take 800 mcg by mouth daily , Historical Med      gabapentin (NEURONTIN) 600 MG tablet TAKE 1 TABLET DAILY, Starting Tue 1/21/2025, Normal      glucose blood (FREESTYLE LITE) test strip Check blood sugar 3 times a day, Normal      Ibrutinib 140 MG CAPS Take 1 capsule (140 mg total) by mouth daily, Starting Thu 3/13/2025, Normal      Injection Device for Insulin (CeQur Simplicity 2U) JOE Use 1 patch every 3 days, disp 1 box of 10 patches for 30 days supply, Normal      Injection Device for Insulin (CeQur Simplicity ) MISC Use to insert simplicity device., Normal      !!  insulin degludec (Tresiba FlexTouch) 100 units/mL injection pen Inject 18 Units under the skin daily at bedtime, Starting Thu 12/12/2024, Normal      insulin lispro (Admelog SoloStar) 100 units/mL injection pen 12-14 units before meals, Normal      Insulin Pen Needle (BD Pen Needle Inez U/F) 32G X 4 MM MISC Use 3 (three) times a day, Starting Thu 12/12/2024, Normal      labetalol (NORMODYNE) 100 mg tablet Take 1 tablet (100 mg total) by mouth 2 (two) times a day, Starting Mon 11/4/2024, Normal      !! Lancets (FREESTYLE) lancets Use as instructed--test 2 times a day    E 11.9, Print      !! Lancets (freestyle) lancets TEST BLOOD SUGAR 3 TIMES A DAY, Normal      LORazepam (ATIVAN) 0.5 mg tablet Take 1 tablet (0.5 mg total) by mouth daily as needed for anxiety, Starting Thu 10/3/2024, Normal      losartan (COZAAR) 100 MG tablet TAKE 1 TABLET DAILY, Normal      mometasone-formoterol (Dulera) 200-5 MCG/ACT inhaler Inhale 2 puffs 2 (two) times a day Rinse mouth after use., Starting Fri 2/25/2022, Normal      multivitamin (THERAGRAN) TABS Take 1 tablet by mouth daily, Historical Med      naloxone (NARCAN) 4 mg/0.1 mL nasal spray Administer 1 spray into a nostril. If no response after 2-3 minutes, give another dose in the other nostril using a new spray., Normal      obinutuzumab (GAZYVA) 1000 mg/40 mL SOLN Infuse into a venous catheter, Historical Med      oxyCODONE (ROXICODONE) 10 MG TABS Take 1 tablet (10 mg total) by mouth every 6 (six) hours as needed for moderate pain Max Daily Amount: 40 mg, Starting Wed 11/20/2024, Normal      posaconazole (NOXAFIL) 40 mg/mL suspension Take by mouth daily, Historical Med      !! Tresiba FlexTouch 100 units/mL injection pen INJECT 12 UNITS SUB-Q DAILY AS DIRECTED., Normal      Vonoprazan Fumarate 20 MG TABS Take 20 mg by mouth in the morning, Starting Wed 4/24/2024, Normal       !! - Potential duplicate medications found. Please discuss with provider.        STOP taking these  medications       predniSONE 20 mg tablet Comments:   Reason for Stopping:             No discharge procedures on file.  ED SEPSIS DOCUMENTATION   Time reflects when diagnosis was documented in both MDM as applicable and the Disposition within this note       Time User Action Codes Description Comment    3/17/2025  4:35 PM Rico Hebert Add [R68.89] Flu-like symptoms     3/17/2025  4:35 PM Rico Hebert Add [R05.9] Cough                  Rico Hebert MD  03/17/25 4561

## 2025-03-17 NOTE — DISCHARGE INSTRUCTIONS
Take the doxycycline twice daily for the next 7 days, make sure to finish off the entire course.    You can try the Tessalon for your cough.     Use your albuterol breathing treatments every 4 hours as needed.

## 2025-03-18 ENCOUNTER — VBI (OUTPATIENT)
Dept: FAMILY MEDICINE CLINIC | Facility: CLINIC | Age: 70
End: 2025-03-18

## 2025-03-18 ENCOUNTER — HOSPITAL ENCOUNTER (OUTPATIENT)
Dept: INFUSION CENTER | Facility: CLINIC | Age: 70
End: 2025-03-18

## 2025-03-18 NOTE — TELEPHONE ENCOUNTER
03/18/25 2:42 PM    Patient contacted post ED visit, [facility] department spoke with patient/caregiver and outreach was successful.    Thank you.  Giorgio Smalls MA  [facility]

## 2025-03-19 ENCOUNTER — TELEPHONE (OUTPATIENT)
Dept: HEMATOLOGY ONCOLOGY | Facility: CLINIC | Age: 70
End: 2025-03-19

## 2025-03-19 LAB
ATRIAL RATE: 90 BPM
P AXIS: 35 DEGREES
PR INTERVAL: 152 MS
QRS AXIS: -39 DEGREES
QRSD INTERVAL: 86 MS
QT INTERVAL: 358 MS
QTC INTERVAL: 437 MS
T WAVE AXIS: 4 DEGREES
VENTRICULAR RATE: 90 BPM

## 2025-03-19 PROCEDURE — 93010 ELECTROCARDIOGRAM REPORT: CPT | Performed by: INTERNAL MEDICINE

## 2025-03-19 NOTE — TELEPHONE ENCOUNTER
called Luis on 3/19/25 and spoke with Debra to obtain a status update on the PT free drug application for his Imsarahuvica. Debra informed writer that the application is pending. Writer inquired if anything else still needs to be provided to complete the application. Debra requested a screenshot of a closed foundation for the PT Dx (CLL) and a copy of the PT 1040 form to verify income. Writer stated he would fax this to [employer] ([phone]) immediately. Debra informwed writer that once received, the PT application will continue to be reviewed and should produce a determination within 48 hours. Writer was grateful for the clarification and guidance.      Writer faxed the PT 2023 1040 form and screenshot of [facility] hospital closed for CLL to [employer] ([phone]) on 3/19/25. Writer will monitor accordingly.            Dutch Houser  Phone:[phone]  Email:[email or web address]

## 2025-03-21 ENCOUNTER — TELEPHONE (OUTPATIENT)
Dept: HEMATOLOGY ONCOLOGY | Facility: CLINIC | Age: 70
End: 2025-03-21

## 2025-03-21 NOTE — TELEPHONE ENCOUNTER
Writer spoke with Debra from [employer] on 3/21/25 to obtain an update on the PT free drug application for his Imbruvica medication. Debra confirmed to writer that they received both the 1040 and screenshot of closed bella status from [employer] which were both previously requested by [employer]. Debra stated the PT application will now undergo benefits investigation and the writer should expect to receive a fax in 1-2 business. Debra confirmed to writer that nothing else should be needed at this time. Writer grateful for clarifications. Writer will set reminder to check for more updates on 3/26/25 if not provided with any prior.            Dutch Houser  Phone:[phone]  Email:[email or web address]

## 2025-03-22 DIAGNOSIS — C91.10 CLL (CHRONIC LYMPHOCYTIC LEUKEMIA) (HCC): ICD-10-CM

## 2025-03-24 ENCOUNTER — TELEPHONE (OUTPATIENT)
Age: 70
End: 2025-03-24

## 2025-03-24 ENCOUNTER — TELEPHONE (OUTPATIENT)
Dept: HEMATOLOGY ONCOLOGY | Facility: CLINIC | Age: 70
End: 2025-03-24

## 2025-03-24 RX ORDER — PREDNISONE 5 MG/1
5 TABLET ORAL DAILY
Qty: 90 TABLET | Refills: 0 | Status: SHIPPED | OUTPATIENT
Start: 2025-03-24

## 2025-03-24 NOTE — TELEPHONE ENCOUNTER
Sivan from [employer] is calling in regards to medication Imbruvica  Requesting prior auth denial the insurance is denying to cover. If insurance does agree to pay they would need proof of how much its going to cost and a denial from cancer care foundation  Further questions please contact Sivan [phone]

## 2025-03-24 NOTE — TELEPHONE ENCOUNTER
PT called writer on 3/24/25 to inform him that he has a 28 day supply currently for his Imbruvica medication. Writer informed PT that he is still fighting to get him re-enrolled for the free drug program for his Imbruvica trough [employer] and will be calling tomorrow to request another update. Writer confirmed to PT that he is working on this diligently and appreciates his patience. PT was very grateful for the support and was provided with writer contact information for future use. Writer informed PT that as soon as he receives impactful updates from [employer] he will keep him informed.            Dutch Houser  Phone:[phone]  Email:[email or web address]

## 2025-03-25 ENCOUNTER — TELEPHONE (OUTPATIENT)
Dept: HEMATOLOGY ONCOLOGY | Facility: CLINIC | Age: 70
End: 2025-03-25

## 2025-03-25 ENCOUNTER — DOCUMENTATION (OUTPATIENT)
Dept: HEMATOLOGY ONCOLOGY | Facility: CLINIC | Age: 70
End: 2025-03-25

## 2025-03-25 NOTE — TELEPHONE ENCOUNTER
Anjur received a fax from [employer] on 3/25/25 regarding the PT free drug application for his Imbruvica. Received fax requesting screenshots from closed grants/foundations for the PT Dx along with medical expense form (attached to fax) if the PT medication has a prior auth approval. Medical expense form attached to fax is not legible to complete.      Writer faxed screenshots of closed funding to [employer]  on 3/25/25 @ 8:30 AM.      Anjur called William on 3/25/25 @ 10:45 AM and spoke with Sherlyn to obtain an update on the PT Imbruvica application status. Sherlyn informed writer of the same information found on fax received earlier. Anjur requested Sherlyn fax a new medical expense form and provided his fax number to do so ([phone]). Sherlyn informed writer that he should receive this by the EOD (3/25/25) or tomorrow morning (3/26/25).        Writer will continue to monitor this situation continuously until resolve is found.            Dutch Houser  Phone:[phone]  Email:[email or web address]

## 2025-03-25 NOTE — PROGRESS NOTES
Writer submitted PT medical expense form to [facility] via fax on 3/25/25            Dutch Houser  Phone:[phone]  Email:[email or web address]

## 2025-03-31 ENCOUNTER — TELEPHONE (OUTPATIENT)
Age: 70
End: 2025-03-31

## 2025-03-31 NOTE — TELEPHONE ENCOUNTER
Pt calling to let Dr. Mejia know that he can not get to his infusion this week.  Pt has the flu.   Appt is for 4/1/25

## 2025-03-31 NOTE — TELEPHONE ENCOUNTER
Patient is calling to inform Dr Mejia that he canceled his chemo appointment for tomorrow because he is still not feeling well.  [facility] was called but they were busy helping patients.

## 2025-04-01 ENCOUNTER — HOSPITAL ENCOUNTER (OUTPATIENT)
Dept: INFUSION CENTER | Facility: CLINIC | Age: 70
Discharge: HOME | End: 2025-04-01

## 2025-04-01 LAB
LEFT EYE DIABETIC RETINOPATHY: POSITIVE
RIGHT EYE DIABETIC RETINOPATHY: POSITIVE

## 2025-04-02 ENCOUNTER — DOCUMENTATION (OUTPATIENT)
Dept: HEMATOLOGY ONCOLOGY | Facility: CLINIC | Age: 70
End: 2025-04-02

## 2025-04-02 ENCOUNTER — TELEPHONE (OUTPATIENT)
Dept: OTHER | Facility: OTHER | Age: 70
End: 2025-04-02

## 2025-04-02 ENCOUNTER — TELEPHONE (OUTPATIENT)
Age: 70
End: 2025-04-02

## 2025-04-02 DIAGNOSIS — D80.1 HYPOGAMMAGLOBULINEMIA, ACQUIRED (HCC): ICD-10-CM

## 2025-04-02 DIAGNOSIS — J45.41 MODERATE PERSISTENT ASTHMATIC BRONCHITIS WITH ACUTE EXACERBATION: Primary | ICD-10-CM

## 2025-04-02 DIAGNOSIS — J42 CHRONIC BRONCHITIS, UNSPECIFIED CHRONIC BRONCHITIS TYPE (HCC): ICD-10-CM

## 2025-04-02 DIAGNOSIS — C91.10 CLL (CHRONIC LYMPHOCYTIC LEUKEMIA) (HCC): ICD-10-CM

## 2025-04-02 NOTE — TELEPHONE ENCOUNTER
Pt's wife reached answering service, she is calling regarding the referral for pulmonology, she mentioned pts oncologist asked if pt could be seen asap, in 1-2 days, they would prefer the [place] office. Could you please call pt directly on his cellphone to assist.

## 2025-04-02 NOTE — PROGRESS NOTES
Jose Mcleod, (Shani Harper )  Could you please arrange for this gentleman to be seen by [place]'s pulmonary as soon as possible likely within 1 or 2 days because he probably has asthmatic bronchitis that is not getting better and he had been to urgent care and has been on antibiotics and cough medicine.  He has underlying COPD.  Has CLL and is immunocompromised.  Referral is in the [facility].  Thanks  Roberto

## 2025-04-02 NOTE — TELEPHONE ENCOUNTER
Patient stated he has been extremely ill for about a month and it hasn't been getting better, he was hospitalized and is out now, but he is still not recovering. He is concerned about missing infusion appts, but does not want to spread whatever he has. Matt # [phone], would like to speak to Dr Mejia if possible.

## 2025-04-02 NOTE — PROGRESS NOTES
Called [phone] and spoke with Debra. She confirmed receipt of the PA.   Prio: [phone]  Requested print out from [employer].  Faxed to yves.    Rain Martinez MPH  Phone: [phone]  Email: [email or web address]

## 2025-04-02 NOTE — TELEPHONE ENCOUNTER
FYI:  Outreach to pt from pts call.  Pt stated he has been to  on 3/11 for cold /flu like symptoms was Tx with ABT/steroids did not improve,went to the ER on 3/17 and was tested for flu/covid/RSV ALL negative.  Was given ABT-Doxycycline and Terra perls. ABT is completed.Cont to take Terra perls prn for cough and using albuterol nebs with some relief.    Pt remains with cough,cannot sleep,achy joints diff eating due to coughing up mucous clear.  Pt stated when he lays flat he coughs less than when sitting.  Pt has lost weight as well due to no appetite due to coughing.Reports no fever at this time.    Pt has missed his infusions and is concerned would like to speak to Dr Mejia and the plan going forward with the way he is feeling.    Advised pt to cont with neb tx terra perls prn for cough monitor temp rest stay hydrated to avoid dehydration,pt understood.    F/U appt sched for 6/4 with Dr Mejia  Pls advise

## 2025-04-03 ENCOUNTER — TELEPHONE (OUTPATIENT)
Dept: HEMATOLOGY ONCOLOGY | Facility: CLINIC | Age: 70
End: 2025-04-03

## 2025-04-03 ENCOUNTER — TELEPHONE (OUTPATIENT)
Dept: PULMONOLOGY | Facility: CLINIC | Age: 70
End: 2025-04-03

## 2025-04-03 NOTE — TELEPHONE ENCOUNTER
CONFIDENTIAL [facility]AL [facility]ALTH DOCUMENT    PEDIATRIC PSYCHOLOGY CONSULTATION- FOLLOW UP NOTE     I have seen and assessed patient today. Medical record reviewed and case discussed with appropriate treatment team members.       Reason For Consultation:  Clinician was consulted to assess patient by Dr. Ama Aleman (adult oncology and hematology service) due to concern for possible depression sxs s/p SOB, tachycardia, and tachypnea and found to have +rhionovirus.  Patient is well known to this clinician from previous admissions.          Clinical Impression:   Patient awake and laying in bed on phone.  He reported feeling \"okay today\".  He provided updates on family members since last admit.  He reported \"I already told the resident this morning I wasn't interested in any therapy here.\"  Clarified with patient who reported \"No offense.  I just don't want to talk about how I'm feeling with anyone.\"  Patient noted he wished for Art therapy to continue to follow to drop off art items but did not wish to \"talk about my feelings\".  He was ambivalent about nell continuing to check in but noted they could continue to provide support as needed.  Clinician thanked patient for voicing his wishes and stated the team would respect his needs.  If they were to change than reach out back.  Patient expressed gratitude.           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   Assessment deferred at this time as patient declines.      Mental Status: Pt appeared stated age and groomed.  Pt was cooperative but had poor eye contact.  Pt was alert and OX3.  Pt had soft speech with normal tone/rate/pace.  Mood and affect were low and flat.  Memory and attention were within normal limits.  Pt denied AVH, SI/plan/intent, and denied  Spoke with Cayetano. Dr Mejia requested the pt see a Pulm, provider. Spoke with the office. He has been scheduled for the 1st available 4/9/25. Management is looking to be seen either today or Friday. The patient will be contacted if a sooner appointment opens up.   HI/plan/intent.  Insight and judgement were fair.     Recommendations & Plan: Pediatrics CL service to sign off on patient.     Case and treatment plan discussed with:   Primary Medical Team   Social Work   Expressive Arts Therapist   Child Life  [place]      Thank you for allowing me to take part in this patient's care.  Please contact me if you have any further questions.     Lisa Motta Psy.D   Clinical Psychologist, Pediatric Psych Consult liaison   Ext:   E-mail: [email or web address]    Time spent with patient/family: 10 mins     This documentation was assisted with Dragon assisted speech recognition. If there are any typographical errors or ambiguities in the dictation please contact the provider for further information or correction.

## 2025-04-03 NOTE — TELEPHONE ENCOUNTER
L/M asking Pt to call back to confirm appt for 4/9/25 @1:30 w/ Dr. Crisostomo @ [facility] if not we can try to schedule to his convenience.

## 2025-04-03 NOTE — TELEPHONE ENCOUNTER
Appt Request    Kelsi / Dr. Gideon Mejia     Requesting pt have appt made today or tomorrow.     Pulmonary [place]/ or [place]     DX; asthmatic bronchitis/ was seen in UC not getting better w/ medication.

## 2025-04-03 NOTE — TELEPHONE ENCOUNTER
Patient returning call that pulmonary appointment on 4/9 will be fine. Gave him the address. He had no questions at this time.

## 2025-04-04 ENCOUNTER — DOCUMENTATION (OUTPATIENT)
Dept: HEMATOLOGY ONCOLOGY | Facility: CLINIC | Age: 70
End: 2025-04-04

## 2025-04-04 NOTE — PROGRESS NOTES
Called PT to notify him that he was approved, Imbruvica free drug.      Rain Martinez MPH  Phone: [phone]  Email: [email or web address]

## 2025-04-06 NOTE — PROGRESS NOTES
Pulmonary Consultation      Reason for Consultation:  Acute cough    Requested by:    Gideon Mejia MD  [address] [facility]  [address]  [place] [address]    Name: Cayetano Lucero      : 1955      MRN: [id number]  Encounter Provider: Sravan Austin DO  Encounter Date: 2025   Encounter department: [facility] PULMONARY [facility]  :  Assessment & Plan  Acute cough  Persistent fever, productive cough for 1 month    CLL (chronic lymphocytic leukemia) (HCC)  Currently on Gazyva + Imbruvica.  S/p chlorambucil + rituxan + venetoclax in , and was later started on since been on gazyva and Imbruvica 2017 due to inadequate control of the disease for venetoclax.  Followed by Dr. Mejia    Hypogammaglobulinemia, acquired (HCC)  Receives IVIG, however missed last 2 doses.    Moderate persistent asthmatic bronchitis with acute exacerbation  Hx of asthma  However PFTs in : normal spirometry and no bronchodilator response.  Only uses ventolin rescue      Tobacco use  Smoked 30py but quit 14 years ago.    Other autoimmune hemolytic anemia (HCC)        Plan:  Refer to ED for evaluation: SIRS positive during office visit, patient with 1 month of fevers, productive cough.   Will need CT Chest for further evaluation  Would check immunoglobulin levels while in ED  Will schedule patient for follow in 4 months in the interim      History of Present Illness     HPI:  Cayetano Lucero is a 69 y.o. male past medical history of CLL, hypogammaglobulinemia, former tobacco use (30py quit ~14 years ago) who was referred to the pulmonary clinic for persistent fevers, productive cough.  Patient has had a positive sick contact which he believes occurred on 3/8- during a birthday party party of his great nephews where there were many children.  Patient was later seen in the emergency department on 3/17 discharged with doxycycline which he completed.  He notes some improvement with doxycycline, but his productive cough  and fevers progressively have gotten worse since completing course of doxycycline. The color of his sputum has changed to green/yellow and the amount of sputum production has increased over the last month as well. Also notes worsening SOB and CARBALLO. He also feels more short of breath while laying down, but no orthopnea, PND. Current episode is associated with decreased appetite as he has not had much to eat or drink over the past 2 days as well. He also notes having been prescribed a second course of antibiotics which he does not remember the name.  Patient regularly receives IVIG, but notes that he has missed his last 2 doses.    Review of Systems   Constitutional:  Positive for appetite change (decreased appetite over past 2 days.), fatigue and fever (102 fever). Negative for chills.   HENT:  Negative for congestion.    Respiratory:  Positive for cough (productive cough) and shortness of breath.    Cardiovascular:  Negative for chest pain, palpitations and leg swelling.   Gastrointestinal:  Positive for nausea. Negative for abdominal distention, abdominal pain, diarrhea and vomiting.   Musculoskeletal:  Positive for myalgias. Negative for arthralgias.   Neurological:  Negative for dizziness and syncope.   Psychiatric/Behavioral:  Negative for agitation, confusion and decreased concentration.        Preventive   Most Recent Immunizations   Administered Date(s) Administered    INFLUENZA 03/20/2019    Pneumococcal Conjugate 13-Valent 12/09/2019    Pneumococcal Polysaccharide PPV23 03/15/2021       Influenza vaccine: Not up-to-date  RSV vaccine: Not up-to-date  COVID-19 vaccination: Not up-to-date  Pneumonia vaccine: received 2019 + 2021  Lung Cancer screening: N/A    Historical Information   Past Medical History:   Diagnosis Date    Allergic     Anemia     Arthritis     CAD (coronary artery disease) 2004    Cancer (HCC)     Leukemia    Cellulitis of scalp     CKD (chronic kidney disease) stage 3, GFR 30-59 ml/min ([facility])      CLL (chronic lymphocytic leukemia) (HCC)     COPD (chronic obstructive pulmonary disease) (HCC)     Diabetes mellitus (HCC)     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    Dyslipidemia     Edema extremities     Esophageal ulcer     H/O blood clots     Hemolytic anemia (HCC)     Hiatal hernia     History of TIA (transient ischemic attack)     Hyperlipidemia     Hypertension     Listeria infection 2018    Multiple gastric ulcers     Myocardial infarction (HCC) 2004    acute    Neutropenia (HCC)     Obese     Recurrent sinusitis     Thrombocytopenia (HCC)     Vertigo      Past Surgical History:   Procedure Laterality Date    ARTHROSCOPIC REPAIR ACL      lt knee    CARDIAC SURGERY      cardiac cath with stent    FOOT SURGERY      IR PORT CHECK  5/17/2019    KNEE ARTHROSCOPY      OTHER SURGICAL HISTORY      cardiac cath lesion 1, 1st adjunct treat device: stent    PORTACATH PLACEMENT      [place] ESOPHAGOGASTRODUODENOSCOPY TRANSORAL DIAGNOSTIC N/A 10/5/2017    Procedure: ESOPHAGOGASTRODUODENOSCOPY (EGD) with bx;  Surgeon: Winifred Hansen DO;  Location: [facility];  Service: Gastroenterology    WV ESOPHAGOGASTRODUODENOSCOPY TRANSORAL DIAGNOSTIC N/A 3/8/2018    Procedure: ESOPHAGOGASTRODUODENOSCOPY (EGD) with biopsy;  Surgeon: Winifred Hansen DO;  [facility]: [facility];  Service: Gastroenterology    WV ESOPHAGOGASTRODUODENOSCOPY TRANSORAL DIAGNOSTIC N/A 6/20/2018    Procedure: ESOPHAGOGASTRODUODENOSCOPY (EGD) with Dilation;  Surgeon: Winifred Hansen DO;  Location: [facility];  Service: Gastroenterology    WV ESOPHAGOGASTRODUODENOSCOPY TRANSORAL DIAGNOSTIC N/A 10/18/2018    Procedure: ESOPHAGOGASTRODUODENOSCOPY (EGD) with dilation;  Surgeon: Edu Mejía MD;  Location: [facility];  Service: Gastroenterology    WV ESOPHAGOGASTRODUODENOSCOPY TRANSORAL DIAGNOSTIC N/A 6/7/2024    Procedure: ESOPHAGOGASTRODUODENOSCOPY (EGD);  Surgeon: Immanuel Gonsales MD;  [facility]: [facility];  Service: Thoracic    WV ESOPHAGOSCOPY FLEXIBLE TRANSORAL WITH BIOPSY  N/A 2016    Procedure: ESOPHAGOGASTRODUODENOSCOPY (EGD); ESOPHAGEAL DILATION; ESOPHAGEAL BIOPSY;  Surgeon: Abdi Holcomb MD;  Location: [facility];  Service: Thoracic    TN LAPS RPR PARAESPHGL HRNA INCL FUNDPLSTY W/O MESH N/A 2024    Procedure: REPAIR HERNIA PARAESOPHAGEAL LAPAROSCOPIC W ROBOTICS TYPE 3. PARTIAL FUNDOPLICATION. MESH.;  Surgeon: Immanuel Gonsales MD;  Location: [facility];  Service: Thoracic    TONSILLECTOMY      UPPER GASTROINTESTINAL ENDOSCOPY      x 6     Family History   Problem Relation Age of Onset    Leukemia Mother         chronic    Colon polyps Mother         sidmoid    Parkinsonism Father        Social History:   Social History     Tobacco Use   Smoking Status Former    Current packs/day: 0.00    Average packs/day: 2.0 packs/day for 33.0 years (66.0 ttl pk-yrs)    Types: Cigarettes    Start date:     Quit date:     Years since quittin.2    Passive exposure: Past   Smokeless Tobacco Never       Meds/Allergies     Current Outpatient Medications:     albuterol (2.5 mg/3 mL) 0.083 % nebulizer solution, Take 3 mL (2.5 mg total) by nebulization every 6 (six) hours as needed for wheezing or shortness of breath, Disp: 180 mL, Rfl: 5    albuterol (Ventolin HFA) 90 mcg/act inhaler, USE 2 INHALATIONS EVERY 6 HOURS AS NEEDED FOR WHEEZING, Disp: 54 g, Rfl: 5    amLODIPine (NORVASC) 10 mg tablet, TAKE 1 TABLET DAILY, Disp: 90 tablet, Rfl: 1    aspirin 81 MG tablet, Take 81 mg by mouth daily Every other day, Disp: , Rfl:     benzonatate (TESSALON PERLES) 100 mg capsule, Take 1 capsule (100 mg total) by mouth every 8 (eight) hours, Disp: 21 capsule, Rfl: 0    Blood Glucose Monitoring Suppl (FreeStyle Freedom Lite) w/Device KIT, Use 3 (three) times a day, Disp: 1 kit, Rfl: 0    citalopram (CeleXA) 40 mg tablet, TAKE 1 TABLET DAILY, Disp: 30 tablet, Rfl: 5    Continuous Blood Gluc  (FreeStyle Ashley 2 Mellen) JOE, Use to scan sensor premeals and at bedtime, Disp: 1 each,  Rfl: 1    Continuous Glucose Sensor (FreeStyle Ashley 2 Sensor) MISC, Apply 1 sensor every 14 days. Use as directed with Freestyle Ashley 2 reader., Disp: 6 each, Rfl: 2    fenofibrate (TRICOR) 145 mg tablet, TAKE 1 TABLET DAILY, Disp: 60 tablet, Rfl: 5    Fluticasone-Salmeterol (Advair Diskus) 250-50 mcg/dose inhaler, Inhale 1 puff 2 (two) times a day Rinse mouth after use., Disp: 60 blister, Rfl: 0    folic acid (FOLVITE) 1 mg tablet, Take 800 mcg by mouth daily , Disp: , Rfl:     gabapentin (NEURONTIN) 600 MG tablet, TAKE 1 TABLET DAILY, Disp: 90 tablet, Rfl: 1    glucose blood (FREESTYLE LITE) test strip, Check blood sugar 3 times a day, Disp: 300 strip, Rfl: 1    Ibrutinib 140 MG CAPS, Take 1 capsule (140 mg total) by mouth daily, Disp: 30 capsule, Rfl: 5    Injection Device for Insulin (CeQur Simplicity 2U) [facility], Use 1 patch every 3 days, disp 1 box of 10 patches for 30 days supply, Disp: 1 each, Rfl: 3    Injection Device for Insulin (CeQur Simplicity ) MISC, Use to insert simplicity device., Disp: 1 each, Rfl: 1    Lancets (FREESTYLE) lancets, Use as instructed--test 2 times a day  E 11.9, Disp: 100 each, Rfl: 0    Lancets (freestyle) lancets, TEST BLOOD SUGAR 3 TIMES A DAY, Disp: 300 each, Rfl: 1    LORazepam (ATIVAN) 0.5 mg tablet, Take 1 tablet (0.5 mg total) by mouth daily as needed for anxiety, Disp: 30 tablet, Rfl: 0    losartan (COZAAR) 100 MG tablet, TAKE 1 TABLET DAILY, Disp: 60 tablet, Rfl: 5    mometasone-formoterol (Dulera) 200-5 MCG/ACT inhaler, Inhale 2 puffs 2 (two) times a day Rinse mouth after use., Disp: 13 g, Rfl: 1    multivitamin (THERAGRAN) TABS, Take 1 tablet by mouth daily, Disp: , Rfl:     naloxone (NARCAN) 4 mg/0.1 mL nasal spray, Administer 1 spray into a nostril. If no response after 2-3 minutes, give another dose in the other nostril using a new spray., Disp: 1 each, Rfl: 1    obinutuzumab (GAZYVA) 1000 mg/40 mL SOLN, Infuse into a venous catheter, Disp: , Rfl:      oxyCODONE (ROXICODONE) 10 MG TABS, Take 1 tablet (10 mg total) by mouth every 6 (six) hours as needed for moderate pain Max Daily Amount: 40 mg, Disp: 90 tablet, Rfl: 0    predniSONE 5 mg tablet, Take 1 tablet (5 mg total) by mouth daily, Disp: 90 tablet, Rfl: 0    Tresiba FlexTouch 100 units/mL injection pen, INJECT 12 UNITS SUB-Q DAILY AS DIRECTED., Disp: 15 mL, Rfl: 5    acyclovir (ZOVIRAX) 400 MG tablet, TAKE 1 TABLET TWICE A DAY, Disp: 60 tablet, Rfl: 11    benzonatate (TESSALON) 200 MG capsule, TAKE 1 CAPSULE THREE TIMES A DAY AS NEEDED FOR COUGH (Patient not taking: Reported on 4/9/2025), Disp: 20 capsule, Rfl: 51    Diclofenac Sodium (VOLTAREN) 1 %, Apply 4 g topically 4 (four) times a day (Patient not taking: Reported on 4/9/2025), Disp: 100 g, Rfl: 0    insulin degludec (Tresiba FlexTouch) 100 units/mL injection pen, Inject 18 Units under the skin daily at bedtime, Disp: 15 mL, Rfl: 1    insulin lispro (Admelog SoloStar) 100 units/mL injection pen, 12-14 units before meals, Disp: 45 mL, Rfl: 1    Insulin Pen Needle (BD Pen Needle Inez U/F) 32G X 4 MM MISC, Use 3 (three) times a day (Patient not taking: Reported on 4/9/2025), Disp: 300 each, Rfl: 1    labetalol (NORMODYNE) 100 mg tablet, Take 1 tablet (100 mg total) by mouth 2 (two) times a day (Patient not taking: Reported on 1/29/2025), Disp: 180 tablet, Rfl: 3    posaconazole (NOXAFIL) 40 mg/mL suspension, Take by mouth daily (Patient not taking: Reported on 2/25/2025), Disp: , Rfl:     Vonoprazan Fumarate 20 MG TABS, Take 20 mg by mouth in the morning, Disp: 30 tablet, Rfl: 5    Current Facility-Administered Medications:     bupivacaine (MARCAINE) 0.25 % injection 1 mL, 1 mL, Infiltration, , , 1 mL at 09/09/24 1430    lidocaine (XYLOCAINE) 1 % injection 1 mL, 1 mL, Infiltration, , , 1 mL at 09/09/24 1430    triamcinolone acetonide (KENALOG-40) 40 mg/mL injection 20 mg, 20 mg, Infiltration, , , 20 mg at 09/09/24 1430  Allergies   Allergen Reactions     "Heparin Other (See Comments)     Other reaction(s): Blood Disorders  clot    Macrolides And Ketolides Other (See Comments)     Interacts with other meds he is taking    Simvastatin Myalgia       Vitals: Blood pressure 106/76, pulse (!) 125, temperature (!) 102.1 °F (38.9 °C), temperature source Tympanic, height 6' 1\" (1.854 m), weight 94.8 kg (209 lb), SpO2 93%., Body mass index is 27.57 kg/m². Oxygen Therapy  SpO2: 93 %  Oxygen Therapy: None (Room air)    Physical Exam  Physical Exam  HENT:      Right Ear: There is no impacted cerumen.      Nose: Congestion present.      Mouth/Throat:      Mouth: Mucous membranes are moist.      Pharynx: No oropharyngeal exudate.   Eyes:      Pupils: Pupils are equal, round, and reactive to light.   Cardiovascular:      Rate and Rhythm: Regular rhythm. Tachycardia present.      Pulses: Normal pulses.      Heart sounds: No murmur heard.     No friction rub. No gallop.   Pulmonary:      Effort: No respiratory distress.      Breath sounds: No stridor. Rhonchi present. No wheezing or rales.      Comments: tachypneic  Chest:      Chest wall: No tenderness.   Abdominal:      General: Abdomen is flat. There is no distension.   Musculoskeletal:         General: Normal range of motion.      Cervical back: Normal range of motion.      Right lower leg: No edema.      Left lower leg: No edema.   Skin:     Capillary Refill: Capillary refill takes less than 2 seconds.   Neurological:      Mental Status: He is alert and oriented to person, place, and time.   Psychiatric:         Mood and Affect: Mood normal.         Labs: I have personally reviewed pertinent lab results.  Lab Results   Component Value Date    WBC 8.35 03/17/2025    HGB 14.7 03/17/2025    HCT 43.7 03/17/2025    MCV 92 03/17/2025     03/17/2025     Lab Results   Component Value Date    GLUCOSE 109 12/29/2015    CALCIUM 9.6 03/17/2025     12/29/2015    K 4.0 03/17/2025    CO2 27 03/17/2025     03/17/2025    BUN 18 " "2025    CREATININE 1.01 2025     No results found for: \"IGE\"  Lab Results   Component Value Date    ALT 29 2025    AST 29 2025    ALKPHOS 42 2025    BILITOT 0.7 2015       Imaging and other studies: Results Review Statement: I personally reviewed the following image studies in PACS and associated radiology reports: chest xray. My interpretation of the radiology images/reports is: normal CXR on 3/17/2025.  No results found.    Pulmonary function testin/2021:      Study Interpretation:     Normal lung volumes.    Normal airflow on vital capacity.    No significant improvement in airflow or vital capacity following the administration of bronchodilators.    Normal corrected diffusion capacity.    Normal airway resistance as indicated by the specific airway conductance.    EKG, Pathology, and Other Studies: Results Review Statement: No pertinent imaging studies reviewed.    Disclaimer: Portions of the record may have been created with voice recognition software. Occasional wrong word or \"sound a like\" substitutions may have occurred due to the inherent limitations of voice recognition software. Careful consideration should be taken to recognize, using context, where substitutions have occurred.    Sravan Crisostomo DO   Pulmonary & Critical Care Medicine Fellow PGY-V  [facility]    "

## 2025-04-09 ENCOUNTER — APPOINTMENT (OUTPATIENT)
Dept: CT IMAGING | Facility: HOSPITAL | Age: 70
DRG: 871 | End: 2025-04-09
Payer: MEDICARE

## 2025-04-09 ENCOUNTER — CONSULT (OUTPATIENT)
Dept: PULMONOLOGY | Facility: CLINIC | Age: 70
End: 2025-04-09
Payer: MEDICARE

## 2025-04-09 ENCOUNTER — APPOINTMENT (EMERGENCY)
Dept: RADIOLOGY | Facility: HOSPITAL | Age: 70
DRG: 871 | End: 2025-04-09
Payer: MEDICARE

## 2025-04-09 ENCOUNTER — HOSPITAL ENCOUNTER (INPATIENT)
Facility: HOSPITAL | Age: 70
LOS: 3 days | Discharge: HOME | DRG: 871 | End: 2025-04-13
Attending: EMERGENCY MEDICINE | Admitting: INTERNAL MEDICINE
Payer: MEDICARE

## 2025-04-09 VITALS
TEMPERATURE: 102.1 F | SYSTOLIC BLOOD PRESSURE: 106 MMHG | BODY MASS INDEX: 27.7 KG/M2 | WEIGHT: 209 LBS | OXYGEN SATURATION: 93 % | DIASTOLIC BLOOD PRESSURE: 76 MMHG | HEIGHT: 73 IN | HEART RATE: 125 BPM

## 2025-04-09 DIAGNOSIS — J45.41 MODERATE PERSISTENT ASTHMATIC BRONCHITIS WITH ACUTE EXACERBATION: ICD-10-CM

## 2025-04-09 DIAGNOSIS — R76.8 LOW SERUM IGG FOR AGE: ICD-10-CM

## 2025-04-09 DIAGNOSIS — R65.10 SIRS (SYSTEMIC INFLAMMATORY RESPONSE SYNDROME) (HCC): ICD-10-CM

## 2025-04-09 DIAGNOSIS — R06.09 DYSPNEA ON EXERTION: ICD-10-CM

## 2025-04-09 DIAGNOSIS — Z72.0 TOBACCO USE: Primary | ICD-10-CM

## 2025-04-09 DIAGNOSIS — R05.1 ACUTE COUGH: ICD-10-CM

## 2025-04-09 DIAGNOSIS — J42 CHRONIC BRONCHITIS, UNSPECIFIED CHRONIC BRONCHITIS TYPE (HCC): ICD-10-CM

## 2025-04-09 DIAGNOSIS — J31.0 CHRONIC RHINITIS: ICD-10-CM

## 2025-04-09 DIAGNOSIS — J44.1 COPD EXACERBATION (HCC): ICD-10-CM

## 2025-04-09 DIAGNOSIS — R05.3 CHRONIC COUGH: ICD-10-CM

## 2025-04-09 DIAGNOSIS — I10 HYPERTENSION: ICD-10-CM

## 2025-04-09 DIAGNOSIS — C91.10 CLL (CHRONIC LYMPHOCYTIC LEUKEMIA) (HCC): ICD-10-CM

## 2025-04-09 DIAGNOSIS — D80.1 HYPOGAMMAGLOBULINEMIA, ACQUIRED (HCC): ICD-10-CM

## 2025-04-09 DIAGNOSIS — J18.9 PNEUMONIA: Primary | ICD-10-CM

## 2025-04-09 DIAGNOSIS — E78.5 HYPERLIPIDEMIA: ICD-10-CM

## 2025-04-09 DIAGNOSIS — D59.19 OTHER AUTOIMMUNE HEMOLYTIC ANEMIA (HCC): ICD-10-CM

## 2025-04-09 DIAGNOSIS — I25.10 CAD (CORONARY ARTERY DISEASE): ICD-10-CM

## 2025-04-09 PROBLEM — R91.8 LEFT LOWER LOBE PULMONARY INFILTRATE: Status: ACTIVE | Noted: 2025-04-09

## 2025-04-09 PROBLEM — A41.9 SEPSIS WITHOUT ACUTE ORGAN DYSFUNCTION (HCC): Status: ACTIVE | Noted: 2025-04-09

## 2025-04-09 PROBLEM — R76.12 POSITIVE QUANTIFERON-TB GOLD TEST: Status: RESOLVED | Noted: 2017-08-21 | Resolved: 2025-04-09

## 2025-04-09 LAB
4HR DELTA HS TROPONIN: 0 NG/L
ALBUMIN SERPL BCG-MCNC: 4.2 G/DL (ref 3.5–5)
ALP SERPL-CCNC: 37 U/L (ref 34–104)
ALT SERPL W P-5'-P-CCNC: 15 U/L (ref 7–52)
ANION GAP SERPL CALCULATED.3IONS-SCNC: 12 MMOL/L (ref 4–13)
ANISOCYTOSIS BLD QL SMEAR: PRESENT
APTT PPP: 33 SECONDS (ref 23–34)
AST SERPL W P-5'-P-CCNC: 25 U/L (ref 13–39)
ATRIAL RATE: 116 BPM
BACTERIA UR QL AUTO: ABNORMAL /HPF
BASOPHILS # BLD MANUAL: 0 THOUSAND/UL (ref 0–0.1)
BASOPHILS NFR MAR MANUAL: 0 % (ref 0–1)
BILIRUB SERPL-MCNC: 0.8 MG/DL (ref 0.2–1)
BILIRUB UR QL STRIP: ABNORMAL
BNP SERPL-MCNC: 118 PG/ML (ref 0–100)
BUN SERPL-MCNC: 20 MG/DL (ref 5–25)
CALCIUM SERPL-MCNC: 9.5 MG/DL (ref 8.4–10.2)
CARDIAC TROPONIN I PNL SERPL HS: 7 NG/L (ref ?–50)
CARDIAC TROPONIN I PNL SERPL HS: 7 NG/L (ref ?–50)
CHLORIDE SERPL-SCNC: 101 MMOL/L (ref 96–108)
CLARITY UR: CLEAR
CO2 SERPL-SCNC: 20 MMOL/L (ref 21–32)
COLOR UR: YELLOW
CREAT SERPL-MCNC: 1.05 MG/DL (ref 0.6–1.3)
EOSINOPHIL # BLD MANUAL: 0 THOUSAND/UL (ref 0–0.4)
EOSINOPHIL NFR BLD MANUAL: 0 % (ref 0–6)
ERYTHROCYTE [DISTWIDTH] IN BLOOD BY AUTOMATED COUNT: 13.2 % (ref 11.6–15.1)
FLUAV RNA RESP QL NAA+PROBE: NEGATIVE
FLUBV RNA RESP QL NAA+PROBE: NEGATIVE
GFR SERPL CREATININE-BSD FRML MDRD: 71 ML/MIN/1.73SQ M
GIANT PLATELETS BLD QL SMEAR: PRESENT
GLUCOSE SERPL-MCNC: 114 MG/DL (ref 65–140)
GLUCOSE SERPL-MCNC: 152 MG/DL (ref 65–140)
GLUCOSE SERPL-MCNC: 246 MG/DL (ref 65–140)
GLUCOSE UR STRIP-MCNC: NEGATIVE MG/DL
HCT VFR BLD AUTO: 42.8 % (ref 36.5–49.3)
HGB BLD-MCNC: 14.4 G/DL (ref 12–17)
HGB UR QL STRIP.AUTO: NEGATIVE
HYALINE CASTS #/AREA URNS LPF: ABNORMAL /LPF
INR PPP: 1.34 (ref 0.85–1.19)
KETONES UR STRIP-MCNC: ABNORMAL MG/DL
LACTATE SERPL-SCNC: 1.4 MMOL/L (ref 0.5–2)
LEUKOCYTE ESTERASE UR QL STRIP: NEGATIVE
LG PLATELETS BLD QL SMEAR: PRESENT
LIPASE SERPL-CCNC: <6 U/L (ref 11–82)
LYMPHOCYTES # BLD AUTO: 1 THOUSAND/UL (ref 0.6–4.47)
LYMPHOCYTES # BLD AUTO: 7 % (ref 14–44)
MCH RBC QN AUTO: 30 PG (ref 26.8–34.3)
MCHC RBC AUTO-ENTMCNC: 33.6 G/DL (ref 31.4–37.4)
MCV RBC AUTO: 89 FL (ref 82–98)
MONOCYTES # BLD AUTO: 1.22 THOUSAND/UL (ref 0–1.22)
MONOCYTES NFR BLD: 11 % (ref 4–12)
MUCOUS THREADS UR QL AUTO: ABNORMAL
MYELOCYTE ABSOLUTE CT: 0.11 THOUSAND/UL (ref 0–0.1)
MYELOCYTES NFR BLD MANUAL: 1 % (ref 0–1)
NEUTROPHILS # BLD MANUAL: 8.78 THOUSAND/UL (ref 1.85–7.62)
NEUTS BAND NFR BLD MANUAL: 4 % (ref 0–8)
NEUTS SEG NFR BLD AUTO: 75 % (ref 43–75)
NITRITE UR QL STRIP: NEGATIVE
NON-SQ EPI CELLS URNS QL MICRO: ABNORMAL /HPF
P AXIS: 45 DEGREES
PH UR STRIP.AUTO: 6 [PH] (ref 4.5–8)
PHOSPHATE SERPL-MCNC: 2.6 MG/DL (ref 2.3–4.1)
PLATELET # BLD AUTO: 288 THOUSANDS/UL (ref 149–390)
PLATELET # BLD AUTO: 337 THOUSANDS/UL (ref 149–390)
PLATELET BLD QL SMEAR: ABNORMAL
PMV BLD AUTO: 10.8 FL (ref 8.9–12.7)
PMV BLD AUTO: 10.9 FL (ref 8.9–12.7)
POLYCHROMASIA BLD QL SMEAR: PRESENT
POTASSIUM SERPL-SCNC: 3.7 MMOL/L (ref 3.5–5.3)
PR INTERVAL: 142 MS
PROCALCITONIN SERPL-MCNC: 0.18 NG/ML
PROT SERPL-MCNC: 6.6 G/DL (ref 6.4–8.4)
PROT UR STRIP-MCNC: ABNORMAL MG/DL
PROTHROMBIN TIME: 16.7 SECONDS (ref 12.3–15)
QRS AXIS: -35 DEGREES
QRSD INTERVAL: 80 MS
QT INTERVAL: 308 MS
QTC INTERVAL: 428 MS
RBC # BLD AUTO: 4.8 MILLION/UL (ref 3.88–5.62)
RBC #/AREA URNS AUTO: ABNORMAL /HPF
RBC MORPH BLD: PRESENT
RSV RNA RESP QL NAA+PROBE: NEGATIVE
S PNEUM AG UR QL: NEGATIVE
SARS-COV-2 RNA RESP QL NAA+PROBE: NEGATIVE
SODIUM SERPL-SCNC: 133 MMOL/L (ref 135–147)
SP GR UR STRIP.AUTO: >=1.03 (ref 1–1.03)
T WAVE AXIS: 50 DEGREES
UROBILINOGEN UR QL STRIP.AUTO: 0.2 E.U./DL
VARIANT LYMPHS # BLD AUTO: 2 %
VENTRICULAR RATE: 116 BPM
WBC # BLD AUTO: 11.11 THOUSAND/UL (ref 4.31–10.16)
WBC #/AREA URNS AUTO: ABNORMAL /HPF

## 2025-04-09 PROCEDURE — 84145 PROCALCITONIN (PCT): CPT | Performed by: STUDENT IN AN ORGANIZED HEALTH CARE EDUCATION/TRAINING PROGRAM

## 2025-04-09 PROCEDURE — 99285 EMERGENCY DEPT VISIT HI MDM: CPT

## 2025-04-09 PROCEDURE — 85027 COMPLETE CBC AUTOMATED: CPT | Performed by: EMERGENCY MEDICINE

## 2025-04-09 PROCEDURE — 85007 BL SMEAR W/DIFF WBC COUNT: CPT | Performed by: EMERGENCY MEDICINE

## 2025-04-09 PROCEDURE — 83690 ASSAY OF LIPASE: CPT | Performed by: EMERGENCY MEDICINE

## 2025-04-09 PROCEDURE — 85730 THROMBOPLASTIN TIME PARTIAL: CPT | Performed by: EMERGENCY MEDICINE

## 2025-04-09 PROCEDURE — 0241U HB NFCT DS VIR RESP RNA 4 TRGT: CPT | Performed by: EMERGENCY MEDICINE

## 2025-04-09 PROCEDURE — 84100 ASSAY OF PHOSPHORUS: CPT | Performed by: EMERGENCY MEDICINE

## 2025-04-09 PROCEDURE — 94640 AIRWAY INHALATION TREATMENT: CPT

## 2025-04-09 PROCEDURE — 93005 ELECTROCARDIOGRAM TRACING: CPT

## 2025-04-09 PROCEDURE — 96375 TX/PRO/DX INJ NEW DRUG ADDON: CPT

## 2025-04-09 PROCEDURE — 85610 PROTHROMBIN TIME: CPT | Performed by: EMERGENCY MEDICINE

## 2025-04-09 PROCEDURE — 99215 OFFICE O/P EST HI 40 MIN: CPT | Performed by: INTERNAL MEDICINE

## 2025-04-09 PROCEDURE — 71250 CT THORAX DX C-: CPT

## 2025-04-09 PROCEDURE — 36415 COLL VENOUS BLD VENIPUNCTURE: CPT | Performed by: EMERGENCY MEDICINE

## 2025-04-09 PROCEDURE — 80053 COMPREHEN METABOLIC PANEL: CPT | Performed by: EMERGENCY MEDICINE

## 2025-04-09 PROCEDURE — 83605 ASSAY OF LACTIC ACID: CPT | Performed by: EMERGENCY MEDICINE

## 2025-04-09 PROCEDURE — 99223 1ST HOSP IP/OBS HIGH 75: CPT | Performed by: STUDENT IN AN ORGANIZED HEALTH CARE EDUCATION/TRAINING PROGRAM

## 2025-04-09 PROCEDURE — 96365 THER/PROPH/DIAG IV INF INIT: CPT

## 2025-04-09 PROCEDURE — 87040 BLOOD CULTURE FOR BACTERIA: CPT | Performed by: EMERGENCY MEDICINE

## 2025-04-09 PROCEDURE — 87449 NOS EACH ORGANISM AG IA: CPT | Performed by: STUDENT IN AN ORGANIZED HEALTH CARE EDUCATION/TRAINING PROGRAM

## 2025-04-09 PROCEDURE — 71046 X-RAY EXAM CHEST 2 VIEWS: CPT

## 2025-04-09 PROCEDURE — 84484 ASSAY OF TROPONIN QUANT: CPT | Performed by: EMERGENCY MEDICINE

## 2025-04-09 PROCEDURE — 85049 AUTOMATED PLATELET COUNT: CPT | Performed by: STUDENT IN AN ORGANIZED HEALTH CARE EDUCATION/TRAINING PROGRAM

## 2025-04-09 PROCEDURE — 96367 TX/PROPH/DG ADDL SEQ IV INF: CPT

## 2025-04-09 PROCEDURE — 82948 REAGENT STRIP/BLOOD GLUCOSE: CPT

## 2025-04-09 PROCEDURE — 83880 ASSAY OF NATRIURETIC PEPTIDE: CPT | Performed by: EMERGENCY MEDICINE

## 2025-04-09 PROCEDURE — G2211 COMPLEX E/M VISIT ADD ON: HCPCS | Performed by: INTERNAL MEDICINE

## 2025-04-09 PROCEDURE — 81001 URINALYSIS AUTO W/SCOPE: CPT

## 2025-04-09 PROCEDURE — 93010 ELECTROCARDIOGRAM REPORT: CPT | Performed by: INTERNAL MEDICINE

## 2025-04-09 RX ORDER — SODIUM CHLORIDE, SODIUM LACTATE, POTASSIUM CHLORIDE, CALCIUM CHLORIDE 600; 310; 30; 20 MG/100ML; MG/100ML; MG/100ML; MG/100ML
125 INJECTION, SOLUTION INTRAVENOUS CONTINUOUS
Status: DISCONTINUED | OUTPATIENT
Start: 2025-04-09 | End: 2025-04-10

## 2025-04-09 RX ORDER — DOXYCYCLINE 100 MG/1
100 CAPSULE ORAL ONCE
Status: COMPLETED | OUTPATIENT
Start: 2025-04-09 | End: 2025-04-09

## 2025-04-09 RX ORDER — PREDNISONE 5 MG/1
5 TABLET ORAL DAILY
Status: DISCONTINUED | OUTPATIENT
Start: 2025-04-10 | End: 2025-04-13 | Stop reason: HOSPADM

## 2025-04-09 RX ORDER — MAGNESIUM SULFATE HEPTAHYDRATE 40 MG/ML
2 INJECTION, SOLUTION INTRAVENOUS ONCE
Status: COMPLETED | OUTPATIENT
Start: 2025-04-09 | End: 2025-04-09

## 2025-04-09 RX ORDER — CITALOPRAM HYDROBROMIDE 20 MG/1
40 TABLET ORAL DAILY
Status: DISCONTINUED | OUTPATIENT
Start: 2025-04-10 | End: 2025-04-13 | Stop reason: HOSPADM

## 2025-04-09 RX ORDER — OXYCODONE HYDROCHLORIDE 10 MG/1
10 TABLET ORAL EVERY 6 HOURS PRN
Refills: 0 | Status: DISCONTINUED | OUTPATIENT
Start: 2025-04-09 | End: 2025-04-13 | Stop reason: HOSPADM

## 2025-04-09 RX ORDER — ONDANSETRON 2 MG/ML
4 INJECTION INTRAMUSCULAR; INTRAVENOUS ONCE
Status: COMPLETED | OUTPATIENT
Start: 2025-04-09 | End: 2025-04-09

## 2025-04-09 RX ORDER — SODIUM CHLORIDE, SODIUM LACTATE, POTASSIUM CHLORIDE, CALCIUM CHLORIDE 600; 310; 30; 20 MG/100ML; MG/100ML; MG/100ML; MG/100ML
150 INJECTION, SOLUTION INTRAVENOUS CONTINUOUS
Status: DISCONTINUED | OUTPATIENT
Start: 2025-04-09 | End: 2025-04-09

## 2025-04-09 RX ORDER — LORAZEPAM 0.5 MG/1
0.5 TABLET ORAL DAILY PRN
Status: DISCONTINUED | OUTPATIENT
Start: 2025-04-09 | End: 2025-04-13 | Stop reason: HOSPADM

## 2025-04-09 RX ORDER — ALBUTEROL SULFATE 0.83 MG/ML
2.5 SOLUTION RESPIRATORY (INHALATION) EVERY 6 HOURS PRN
Status: DISCONTINUED | OUTPATIENT
Start: 2025-04-09 | End: 2025-04-10

## 2025-04-09 RX ORDER — ALBUTEROL SULFATE 0.83 MG/ML
5 SOLUTION RESPIRATORY (INHALATION) ONCE
Status: COMPLETED | OUTPATIENT
Start: 2025-04-09 | End: 2025-04-09

## 2025-04-09 RX ORDER — FLUTICASONE FUROATE AND VILANTEROL 200; 25 UG/1; UG/1
1 POWDER RESPIRATORY (INHALATION)
Status: DISCONTINUED | OUTPATIENT
Start: 2025-04-10 | End: 2025-04-13 | Stop reason: HOSPADM

## 2025-04-09 RX ORDER — INSULIN LISPRO 100 [IU]/ML
1-6 INJECTION, SOLUTION INTRAVENOUS; SUBCUTANEOUS
Status: DISCONTINUED | OUTPATIENT
Start: 2025-04-10 | End: 2025-04-09

## 2025-04-09 RX ORDER — ACETAMINOPHEN 325 MG/1
650 TABLET ORAL ONCE
Status: COMPLETED | OUTPATIENT
Start: 2025-04-09 | End: 2025-04-09

## 2025-04-09 RX ORDER — METHYLPREDNISOLONE SODIUM SUCCINATE 125 MG/2ML
80 INJECTION, POWDER, LYOPHILIZED, FOR SOLUTION INTRAMUSCULAR; INTRAVENOUS ONCE
Status: COMPLETED | OUTPATIENT
Start: 2025-04-09 | End: 2025-04-09

## 2025-04-09 RX ORDER — ONDANSETRON 2 MG/ML
4 INJECTION INTRAMUSCULAR; INTRAVENOUS EVERY 6 HOURS PRN
Status: DISCONTINUED | OUTPATIENT
Start: 2025-04-09 | End: 2025-04-13 | Stop reason: HOSPADM

## 2025-04-09 RX ORDER — IPRATROPIUM BROMIDE AND ALBUTEROL SULFATE 2.5; .5 MG/3ML; MG/3ML
3 SOLUTION RESPIRATORY (INHALATION)
Status: DISCONTINUED | OUTPATIENT
Start: 2025-04-09 | End: 2025-04-11

## 2025-04-09 RX ORDER — FENOFIBRATE 145 MG/1
145 TABLET, FILM COATED ORAL DAILY
Status: DISCONTINUED | OUTPATIENT
Start: 2025-04-10 | End: 2025-04-13

## 2025-04-09 RX ORDER — INSULIN GLARGINE 100 [IU]/ML
18 INJECTION, SOLUTION SUBCUTANEOUS
Status: DISCONTINUED | OUTPATIENT
Start: 2025-04-09 | End: 2025-04-13

## 2025-04-09 RX ORDER — LOSARTAN POTASSIUM 50 MG/1
100 TABLET ORAL DAILY
Status: DISCONTINUED | OUTPATIENT
Start: 2025-04-10 | End: 2025-04-13 | Stop reason: HOSPADM

## 2025-04-09 RX ORDER — AMLODIPINE BESYLATE 10 MG/1
10 TABLET ORAL DAILY
Status: DISCONTINUED | OUTPATIENT
Start: 2025-04-10 | End: 2025-04-13 | Stop reason: HOSPADM

## 2025-04-09 RX ORDER — INSULIN LISPRO 100 [IU]/ML
1-6 INJECTION, SOLUTION INTRAVENOUS; SUBCUTANEOUS
Status: DISCONTINUED | OUTPATIENT
Start: 2025-04-09 | End: 2025-04-10

## 2025-04-09 RX ORDER — FOLIC ACID 0.4 MG
800 TABLET ORAL DAILY
Status: DISCONTINUED | OUTPATIENT
Start: 2025-04-10 | End: 2025-04-13 | Stop reason: HOSPADM

## 2025-04-09 RX ORDER — ASPIRIN 81 MG/1
81 TABLET ORAL DAILY
Status: DISCONTINUED | OUTPATIENT
Start: 2025-04-10 | End: 2025-04-13 | Stop reason: HOSPADM

## 2025-04-09 RX ORDER — BENZONATATE 100 MG/1
100 CAPSULE ORAL EVERY 8 HOURS
Status: DISCONTINUED | OUTPATIENT
Start: 2025-04-09 | End: 2025-04-12

## 2025-04-09 RX ORDER — GABAPENTIN 300 MG/1
600 CAPSULE ORAL DAILY
Status: DISCONTINUED | OUTPATIENT
Start: 2025-04-10 | End: 2025-04-13 | Stop reason: HOSPADM

## 2025-04-09 RX ORDER — ENOXAPARIN SODIUM 100 MG/ML
40 INJECTION SUBCUTANEOUS DAILY
Status: DISCONTINUED | OUTPATIENT
Start: 2025-04-10 | End: 2025-04-09

## 2025-04-09 RX ORDER — PANTOPRAZOLE SODIUM 40 MG/1
40 TABLET, DELAYED RELEASE ORAL
Status: DISCONTINUED | OUTPATIENT
Start: 2025-04-10 | End: 2025-04-13 | Stop reason: HOSPADM

## 2025-04-09 RX ORDER — ACYCLOVIR 800 MG/1
400 TABLET ORAL 2 TIMES DAILY
Status: DISCONTINUED | OUTPATIENT
Start: 2025-04-09 | End: 2025-04-13 | Stop reason: HOSPADM

## 2025-04-09 RX ORDER — ACETAMINOPHEN 325 MG/1
650 TABLET ORAL EVERY 6 HOURS PRN
Status: DISCONTINUED | OUTPATIENT
Start: 2025-04-09 | End: 2025-04-13 | Stop reason: HOSPADM

## 2025-04-09 RX ADMIN — ACYCLOVIR 400 MG: 800 TABLET ORAL at 19:54

## 2025-04-09 RX ADMIN — SODIUM CHLORIDE, SODIUM LACTATE, POTASSIUM CHLORIDE, AND CALCIUM CHLORIDE 500 ML: .6; .31; .03; .02 INJECTION, SOLUTION INTRAVENOUS at 15:49

## 2025-04-09 RX ADMIN — ALBUTEROL SULFATE 5 MG: 2.5 SOLUTION RESPIRATORY (INHALATION) at 15:56

## 2025-04-09 RX ADMIN — DEXTROSE 1000 MG: 50 INJECTION, SOLUTION INTRAVENOUS at 16:25

## 2025-04-09 RX ADMIN — ACETAMINOPHEN 650 MG: 325 TABLET, FILM COATED ORAL at 15:46

## 2025-04-09 RX ADMIN — INSULIN GLARGINE 18 UNITS: 100 INJECTION, SOLUTION SUBCUTANEOUS at 21:49

## 2025-04-09 RX ADMIN — INSULIN LISPRO 1 UNITS: 100 INJECTION, SOLUTION INTRAVENOUS; SUBCUTANEOUS at 19:01

## 2025-04-09 RX ADMIN — BENZONATATE 100 MG: 100 CAPSULE ORAL at 19:54

## 2025-04-09 RX ADMIN — DOXYCYCLINE HYCLATE 100 MG: 100 CAPSULE ORAL at 16:24

## 2025-04-09 RX ADMIN — SODIUM CHLORIDE, SODIUM LACTATE, POTASSIUM CHLORIDE, AND CALCIUM CHLORIDE 125 ML/HR: .6; .31; .03; .02 INJECTION, SOLUTION INTRAVENOUS at 18:36

## 2025-04-09 RX ADMIN — METHYLPREDNISOLONE SODIUM SUCCINATE 80 MG: 125 INJECTION, POWDER, FOR SOLUTION INTRAMUSCULAR; INTRAVENOUS at 15:52

## 2025-04-09 RX ADMIN — ONDANSETRON 4 MG: 2 INJECTION INTRAMUSCULAR; INTRAVENOUS at 15:52

## 2025-04-09 RX ADMIN — MAGNESIUM SULFATE HEPTAHYDRATE 2 G: 40 INJECTION, SOLUTION INTRAVENOUS at 15:55

## 2025-04-09 RX ADMIN — SODIUM CHLORIDE, SODIUM LACTATE, POTASSIUM CHLORIDE, AND CALCIUM CHLORIDE 150 ML/HR: .6; .31; .03; .02 INJECTION, SOLUTION INTRAVENOUS at 15:51

## 2025-04-09 RX ADMIN — INSULIN LISPRO 3 UNITS: 100 INJECTION, SOLUTION INTRAVENOUS; SUBCUTANEOUS at 21:49

## 2025-04-09 NOTE — SEPSIS NOTE
Sepsis Note   Cayetano Lucero 69 y.o. male MRN: [id number]  Unit/Bed#: ED-15 Encounter: [id number]       Initial Sepsis Screening       Row Name 04/09/25 1639                Is the patient's history suggestive of a new or worsening infection? Yes (Proceed)  -CS        Suspected source of infection pneumonia  -CS        Indicate SIRS criteria Tachycardia > 90 bpm;Tachypnea > 20 resp per min  -CS        Are two or more of the above signs & symptoms of infection both present and new to the patient? Yes (Proceed)  -CS        Assess for evidence of organ dysfunction: Are any of the below criteria present within 6 hours of suspected infection and SIRS criteria that are NOT considered to be chronic conditions? --                  User Key  (r) = Recorded By, (t) = Taken By, (c) = Cosigned By      Initials Name Provider Type    CS Joey Lemus MD Physician                        There is no height or weight on file to calculate BMI.  Wt Readings from Last 1 Encounters:   04/09/25 94.8 kg (209 lb)     IBW (Ideal Body Weight): 79.9 kg    Ideal body weight: 79.9 kg (176 lb 2.4 oz)  Adjusted ideal body weight: 85.9 kg (189 lb 4.6 oz)

## 2025-04-09 NOTE — ASSESSMENT & PLAN NOTE
Lab Results   Component Value Date    EGFR 71 04/09/2025    EGFR 75 03/17/2025    EGFR 69 02/18/2025    CREATININE 1.05 04/09/2025    CREATININE 1.01 03/17/2025    CREATININE 1.08 02/18/2025   Creatinine at baseline  Continue to monitor

## 2025-04-09 NOTE — ASSESSMENT & PLAN NOTE
POA a/e/b tachycardia, tachypnea, leukocytosis with evidence of possible pulmonary infiltrates in left lower lobe on CXR  Received Rocephin and Doxy in the ED, continue on Rocephin  F/u sputum cultures  F/u urine legionella and urine strep  F/u CT chest as recommended by Pulmonology  F/u procalcitonin

## 2025-04-09 NOTE — PLAN OF CARE
Problem: Potential for Falls  Goal: Patient will remain free of falls  Description: INTERVENTIONS:- Educate patient/family on patient safety including physical limitations- Instruct patient to call for assistance with activity - Consult OT/PT to assist with strengthening/mobility - Keep Call bell within reach- Keep bed low and locked with side rails adjusted as appropriate- Keep care items and personal belongings within reach- Initiate and maintain comfort rounds- Make Fall Risk Sign visible to staff INTERVENTIONS:- Educate patient/family on patient safety including physical limitations- Instruct patient to call for assistance with activity - Consult OT/PT to assist with strengthening/mobility - Keep Call bell within reach- Keep bed low and locked with side rails adjusted as appropriate- Keep care items and personal belongings within reach- Initiate and maintain comfort rounds- Make Fall Risk Sign visible to staff Consider moving patient to room near nurses station  Outcome: Progressing     Problem: PAIN - ADULT  Goal: Verbalizes/displays adequate comfort level or baseline comfort level  Description: Interventions:- Encourage patient to monitor pain and request assistance- Assess pain using appropriate pain scale- Administer analgesics based on type and severity of pain and evaluate response- Implement non-pharmacological measures as appropriate and evaluate response- Consider cultural and social influences on pain and pain management- Notify physician/advanced practitioner if interventions unsuccessful or patient reports new pain  Outcome: Progressing     Problem: INFECTION - ADULT  Goal: Absence or prevention of progression during hospitalization  Description: INTERVENTIONS:- Assess and monitor for signs and symptoms of infection- Monitor lab/diagnostic results- Monitor all insertion sites, i.e. indwelling lines, tubes, and drains- Monitor endotracheal if appropriate and nasal secretions for changes in amount  and color- Webster appropriate cooling/warming therapies per order- Administer medications as ordered- Instruct and encourage patient and family to use good hand hygiene technique- Identify and instruct in appropriate isolation precautions for identified infection/condition  Outcome: Progressing  Goal: Absence of fever/infection during neutropenic period  Description: INTERVENTIONS:- Monitor WBC  Outcome: Progressing     Problem: SAFETY ADULT  Goal: Patient will remain free of falls  Description: INTERVENTIONS:- Educate patient/family on patient safety including physical limitations- Instruct patient to call for assistance with activity - Consult OT/PT to assist with strengthening/mobility - Keep Call bell within reach- Keep bed low and locked with side rails adjusted as appropriate- Keep care items and personal belongings within reach- Initiate and maintain comfort rounds- Make Fall Risk Sign visible to staff INTERVENTIONS:- Educate patient/family on patient safety including physical limitations- Instruct patient to call for assistance with activity - Consult OT/PT to assist with strengthening/mobility - Keep Call bell within reach- Keep bed low and locked with side rails adjusted as appropriate- Keep care items and personal belongings within reach- Initiate and maintain comfort rounds- Make Fall Risk Sign visible to staff Consider moving patient to room near nurses station  Outcome: Progressing  Goal: Maintain or return to baseline ADL function  Description: INTERVENTIONS:-  Assess patient's ability to carry out ADLs; assess patient's baseline for ADL function and identify physical deficits which impact ability to perform ADLs (bathing, care of mouth/teeth, toileting, grooming, dressing, etc.)- Assess/evaluate cause of self-care deficits - Assess range of motion- Assess patient's mobility; develop plan if impaired- Assess patient's need for assistive devices and provide as appropriate- Encourage maximum  independence but intervene and supervise when necessary- Involve family in performance of ADLs- Assess for home care needs following discharge - Consider OT consult to assist with ADL evaluation and planning for discharge- Provide patient education as appropriate  Outcome: Progressing  Goal: Maintains/Returns to pre admission functional level  Description: INTERVENTIONS:- Perform AM-PAC 6 Click Basic Mobility/ Daily Activity assessment daily.- Set and communicate daily mobility goal to care team and patient/family/caregiver. - Collaborate with rehabilitation services on mobility goals if consulted  Outcome: Progressing     Problem: DISCHARGE PLANNING  Goal: Discharge to home or other facility with appropriate resources  Description: INTERVENTIONS:- Identify barriers to discharge w/patient and caregiver- Arrange for needed discharge resources and transportation as appropriate- Identify discharge learning needs (meds, wound care, etc.)- Arrange for interpretive services to assist at discharge as needed- Refer to Case Management Department for coordinating discharge planning if the patient needs post-hospital services based on physician/advanced practitioner order or complex needs related to functional status, cognitive ability, or social support system  Outcome: Progressing     Problem: Knowledge Deficit  Goal: Patient/family/caregiver demonstrates understanding of disease process, treatment plan, medications, and discharge instructions  Description: Complete learning assessment and assess knowledge base.Interventions:- Provide teaching at level of understanding- Provide teaching via preferred learning methods  Outcome: Progressing     Problem: GASTROINTESTINAL - ADULT  Goal: Minimal or absence of nausea and/or vomiting  Description: INTERVENTIONS:- Administer IV fluids if ordered to ensure adequate hydration- Maintain NPO status until nausea and vomiting are resolved- Nasogastric tube if ordered- Administer ordered  antiemetic medications as needed- Provide nonpharmacologic comfort measures as appropriate- Advance diet as tolerated, if ordered- Consider nutrition services referral to assist patient with adequate nutrition and appropriate food choices  Outcome: Progressing  Goal: Maintains adequate nutritional intake  Description: INTERVENTIONS:- Monitor percentage of each meal consumed- Identify factors contributing to decreased intake, treat as appropriate- Assist with meals as needed- Monitor I&O, weight, and lab values if indicated- Obtain nutrition services referral as needed  Outcome: Progressing     Problem: METABOLIC, FLUID AND ELECTROLYTES - ADULT  Goal: Electrolytes maintained within normal limits  Description: INTERVENTIONS:- Monitor labs and assess patient for signs and symptoms of electrolyte imbalances- Administer electrolyte replacement as ordered- Monitor response to electrolyte replacements, including repeat lab results as appropriate- Instruct patient on fluid and nutrition as appropriate  Outcome: Progressing  Goal: Fluid balance maintained  Description: INTERVENTIONS:- Monitor labs - Monitor I/O and WT- Instruct patient on fluid and nutrition as appropriate- Assess for signs & symptoms of volume excess or deficit  Outcome: Progressing

## 2025-04-09 NOTE — ED PROVIDER NOTES
Time reflects when diagnosis was documented in both MDM as applicable and the Disposition within this note       Time User Action Codes Description Comment    4/9/2025  4:33 PM Joey Lemus [J18.9] Pneumonia     4/9/2025  4:33 PM Joey Lemus [R65.10] SIRS (systemic inflammatory response syndrome) (HCC)     4/9/2025  4:33 PM Joey Lemus [J44.1] COPD exacerbation (HCC)           ED Disposition       ED Disposition   Admit    Condition   Stable    Date/Time   Wed Apr 9, 2025  4:33 PM    Comment   Case was discussed with MARIELA and the patient's admission status was agreed to be Admission Status: observation status to the service of Dr. Andrews .               Assessment & Plan       Medical Decision Making  Amount and/or Complexity of Data Reviewed  Labs: ordered.  Radiology: ordered.    Risk  OTC drugs.  Prescription drug management.  Decision regarding hospitalization.    Patient with pneumonia.  He has SIRS criteria but does not meet criteria's for severe sepsis or septic shock.  He appears quite dehydrated his urine is very dark.  He does have an elevated white count mildly so without bandemia.  Hemoglobin is stable.  Normal platelet count.  Does have mildly low sodium most likely secondary to dehydration as he has not had this in the past.  Cardiac evaluation was otherwise negative with a normal lactic.  He was given IV fluids.  I covered him with IV antibiotics.  He also has evidence of a COPD exacerbation so I treated him with albuterol and Solu-Medrol.  He is on chronic steroids, low-dose prednisone.  I also gave him IV magnesium.  I discussed the case with internal medicine and they agreed to admit him to the hospital.         Medications   lactated ringers infusion (150 mL/hr Intravenous New Bag 4/9/25 4929)   lactated ringers bolus 500 mL (500 mL Intravenous New Bag 4/9/25 1346)   ondansetron (ZOFRAN) injection 4 mg (4 mg Intravenous Given 4/9/25 5220)    acetaminophen (TYLENOL) tablet 650 mg (650 mg Oral Given 4/9/25 1546)   albuterol inhalation solution 5 mg (5 mg Nebulization Given 4/9/25 1556)   methylPREDNISolone sodium succinate (Solu-MEDROL) injection 80 mg (80 mg Intravenous Given 4/9/25 1552)   magnesium sulfate 2 g/50 mL IVPB (premix) 2 g (0 g Intravenous Stopped 4/9/25 1625)   ceftriaxone (ROCEPHIN) 1 g/50 mL in dextrose IVPB (1,000 mg Intravenous New Bag 4/9/25 1625)   doxycycline hyclate (VIBRAMYCIN) capsule 100 mg (100 mg Oral Given 4/9/25 1624)       ED Risk Strat Scores                    No data recorded        SBIRT 22yo+      Flowsheet Row Most Recent Value   Initial Alcohol Screen: US AUDIT-C     1. How often do you have a drink containing alcohol? 0 Filed at: 04/09/2025 1502   2. How many drinks containing alcohol do you have on a typical day you are drinking?  0 Filed at: 04/09/2025 1502   3a. Male UNDER 65: How often do you have five or more drinks on one occasion? 0 Filed at: 04/09/2025 1502   3b. FEMALE Any Age, or MALE 65+: How often do you have 4 or more drinks on one occassion? 0 Filed at: 04/09/2025 1502   Audit-C Score 0 Filed at: 04/09/2025 1502   BRANDI: How many times in the past year have you...    Used an illegal drug or used a prescription medication for non-medical reasons? Never Filed at: 04/09/2025 1502                            History of Present Illness       Chief Complaint   Patient presents with    Shortness of Breath     Sick w flu ss for approx 1 month, increased mucus, sob, decreased appetite, sent from pulm       Past Medical History:   Diagnosis Date    Allergic     Anemia     Arthritis     CAD (coronary artery disease) 2004    Cancer (HCC)     Leukemia    Cellulitis of scalp     CKD (chronic kidney disease) stage 3, GFR 30-59 ml/min (HCC)     CLL (chronic lymphocytic leukemia) (HCC)     COPD (chronic obstructive pulmonary disease) (HCC)     Diabetes mellitus (HCC)     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    Dyslipidemia     Edema  extremities     Esophageal ulcer     H/O blood clots     Hemolytic anemia (HCC)     Hiatal hernia     History of TIA (transient ischemic attack)     Hyperlipidemia     Hypertension     Listeria infection 2018    Multiple gastric ulcers     Myocardial infarction (HCC) 2004    acute    Neutropenia (HCC)     Obese     Positive QuantiFERON-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 08/21/2017    Recurrent sinusitis     Thrombocytopenia (HCC)     Vertigo       Past Surgical History:   Procedure Laterality Date    ARTHROSCOPIC REPAIR ACL      lt knee    CARDIAC SURGERY      cardiac cath with stent    FOOT SURGERY      IR PORT CHECK  5/17/2019    KNEE ARTHROSCOPY      OTHER SURGICAL HISTORY      cardiac cath lesion 1, 1st adjunct treat device: stent    PORTACATH PLACEMENT      KS ESOPHAGOGASTRODUODENOSCOPY TRANSORAL DIAGNOSTIC N/A 10/5/2017    Procedure: ESOPHAGOGASTRODUODENOSCOPY (EGD) with bx;  Surgeon: Winifred Hansen DO;  Location: [facility];  Service: Gastroenterology    KS ESOPHAGOGASTRODUODENOSCOPY TRANSORAL DIAGNOSTIC N/A 3/8/2018    Procedure: ESOPHAGOGASTRODUODENOSCOPY (EGD) with biopsy;  Surgeon: Winifred Hansen DO;  Location: [facility];  Service: Gastroenterology    KS ESOPHAGOGASTRODUODENOSCOPY TRANSORAL DIAGNOSTIC N/A 6/20/2018    Procedure: ESOPHAGOGASTRODUODENOSCOPY (EGD) with Dilation;  Surgeon: Winifred Hansen DO;  Location: [facility];  Service: Gastroenterology    KS ESOPHAGOGASTRODUODENOSCOPY TRANSORAL DIAGNOSTIC N/A 10/18/2018    Procedure: ESOPHAGOGASTRODUODENOSCOPY (EGD) with dilation;  Surgeon: Edu Mejía MD;  Location: [facility];  Service: Gastroenterology    KS ESOPHAGOGASTRODUODENOSCOPY TRANSORAL DIAGNOSTIC N/A 6/7/2024    Procedure: ESOPHAGOGASTRODUODENOSCOPY (EGD);  Surgeon: Immanuel Gonsales MD;  Location: [facility];  Service: Thoracic    KS ESOPHAGOSCOPY FLEXIBLE TRANSORAL WITH BIOPSY N/A 9/2/2016    Procedure: ESOPHAGOGASTRODUODENOSCOPY (EGD); ESOPHAGEAL DILATION; ESOPHAGEAL BIOPSY;  Surgeon: Abdi Holcomb  MD;  Location: [facility];  Service: Thoracic    RI LAPS RPR PARAESPHGL HRNA INCL FUNDPLSTY W/O MESH N/A 2024    Procedure: REPAIR HERNIA PARAESOPHAGEAL LAPAROSCOPIC W ROBOTICS TYPE 3. PARTIAL FUNDOPLICATION. MESH.;  Surgeon: Immanuel Gonsales MD;  Location: [facility];  Service: Thoracic    TONSILLECTOMY      UPPER GASTROINTESTINAL ENDOSCOPY      x 6      Family History   Problem Relation Age of Onset    Leukemia Mother         chronic    Colon polyps Mother         sidmoid    Parkinsonism Father       Social History     Tobacco Use    Smoking status: Former     Current packs/day: 0.00     Average packs/day: 2.0 packs/day for 33.0 years (66.0 ttl pk-yrs)     Types: Cigarettes     Start date:      Quit date:      Years since quittin.2     Passive exposure: Past    Smokeless tobacco: Never   Vaping Use    Vaping status: Never Used   Substance Use Topics    Alcohol use: Yes     Alcohol/week: 2.0 standard drinks of alcohol     Types: 2 Cans of beer per week     Comment: social use as per [employer]    Drug use: Never      E-Cigarette/Vaping    E-Cigarette Use Never User       E-Cigarette/Vaping Substances    Nicotine No     THC No     CBD No     Flavoring No     Other No     Unknown No       I have reviewed and agree with the history as documented.     HPI  Patient has a history of COPD not on oxygen, CLL, also history of coronary artery disease presenting from the pulmonary office with a fever.  He has been coughing for at least 3 weeks.  He was evaluated here in the emergency department about 3 weeks ago for flulike illness.  At the time per my review COVID flu and RSV were all negative.  He states that he has been progressively worsening to the point where he is very short of breath going up the stairs he has to stop.  He has no specific chest pain.  Did not note a fever.  Has been feeling nauseous at times with decreased p.o. intake.  He has no abdominal pain.  He has been urinating.  No history  of PE or DVT.  Review of Systems        Objective       ED Triage Vitals   Temperature Pulse Blood Pressure Respirations SpO2 Patient Position - Orthostatic VS   04/09/25 1502 04/09/25 1501 04/09/25 1502 04/09/25 1501 04/09/25 1501 04/09/25 1501   98.8 °F (37.1 °C) (!) 122 125/73 (!) 24 93 % Sitting      Temp Source Heart Rate Source BP Location FiO2 (%) Pain Score    04/09/25 1502 04/09/25 1501 04/09/25 1501 -- 04/09/25 1546    Oral Monitor Right arm  7      Vitals      Date and Time Temp Pulse SpO2 Resp BP Pain Score FACES Pain Rating User   04/09/25 1700 98.7 °F (37.1 °C) 98 93 % 22 132/78 No Pain -- LB   04/09/25 1630 -- 105 92 % 22 156/81 -- -- LB   04/09/25 1546 -- -- -- -- -- 7 -- LB   04/09/25 1502 98.8 °F (37.1 °C) -- -- -- 125/73 -- -- GILBERTO   04/09/25 1501 -- 122 93 % 24 -- -- -- GILBERTO            Physical Exam  Vitals and nursing note reviewed.   Constitutional:       General: He is not in acute distress.     Appearance: Normal appearance. He is well-developed. He is not ill-appearing, toxic-appearing or diaphoretic.      Comments: Patient is visibly dyspneic just standing there, is able to speak in full sentences not retracting, has no prolonged expiratory phase.   HENT:      Head: Normocephalic and atraumatic.      Right Ear: Hearing normal. No drainage or swelling.      Left Ear: Hearing normal. No drainage or swelling.      Mouth/Throat:      Mouth: Mucous membranes are dry.   Eyes:      General: Lids are normal.         Right eye: No discharge.         Left eye: No discharge.      Conjunctiva/sclera: Conjunctivae normal.   Neck:      Vascular: No JVD.      Trachea: Trachea normal.   Cardiovascular:      Rate and Rhythm: Regular rhythm. Tachycardia present.      Pulses: Normal pulses.      Heart sounds: Normal heart sounds. No murmur heard.     No friction rub. No gallop.   Pulmonary:      Effort: Pulmonary effort is normal. No respiratory distress.      Breath sounds: No stridor. Wheezing and rales  present. No rhonchi.      Comments: Mild rales at bases.  Chest:      Chest wall: No tenderness.   Abdominal:      Palpations: Abdomen is soft.      Tenderness: There is no abdominal tenderness. There is no guarding or rebound.   Musculoskeletal:         General: No tenderness. Normal range of motion.      Cervical back: Normal range of motion.      Right lower leg: No edema.      Left lower leg: No edema.   Skin:     General: Skin is warm and dry.      Coloration: Skin is not pale.      Findings: No rash.   Neurological:      General: No focal deficit present.      Mental Status: He is alert.      GCS: GCS eye subscore is 4. GCS verbal subscore is 5. GCS motor subscore is 6.      Sensory: No sensory deficit.      Motor: No weakness or abnormal muscle tone.   Psychiatric:         Mood and Affect: Mood normal.         Speech: Speech normal.         Behavior: Behavior is cooperative.         Results Reviewed       Procedure Component Value Units Date/Time    Manual Differential(PHLEBS Do Not Order) [[id number]]  (Abnormal) Collected: 04/09/25 1536    Lab Status: Final result Specimen: Blood from Arm, Left Updated: 04/09/25 1649     Segmented % 75 %      Bands % 4 %      Lymphocytes % 7 %      Monocytes % 11 %      Eosinophils % 0 %      Basophils % 0 %      Myelocytes % 1 %      Atypical Lymphocytes % 2 %      Absolute Neutrophils 8.78 Thousand/uL      Absolute Lymphocytes 1.00 Thousand/uL      Absolute Monocytes 1.22 Thousand/uL      Absolute Eosinophils 0.00 Thousand/uL      Absolute Basophils 0.00 Thousand/uL      Absolute Myelocytes 0.11 Thousand/uL      Total Counted --     RBC Morphology Present     Platelet Estimate Increased     Giant PLTs Present     Large Platelet Present     Anisocytosis Present     Polychromasia Present    FLU/RSV/COVID - if FLU/RSV clinically relevant (2hr TAT) [[id number]]  (Normal) Collected: 04/09/25 1536    Lab Status: Final result Specimen: Nares from Nose Updated: 04/09/25 1640      SARS-CoV-2 Negative     INFLUENZA A PCR Negative     INFLUENZA B PCR Negative     RSV PCR Negative    Narrative:      This test has been performed using the CoV-2/Flu/RSV plus assay on the [employer] platform. This test has been validated by the  and verified by the performing laboratory.     This test is designed to amplify and detect the following: nucleocapsid (N), envelope (E), and RNA-dependent RNA polymerase (RdRP) genes of the SARS-CoV-2 genome; matrix (M), basic polymerase (PB2), and acidic protein (PA) segments of the influenza A genome; matrix (M) and non-structural protein (NS) segments of the influenza B genome, and the nucleocapsid genes of RSV A and RSV B.     Positive results are indicative of the presence of Flu A, Flu B, RSV, and/or SARS-CoV-2 RNA. Positive results for SARS-CoV-2 or suspected novel influenza should be reported to state, local, or federal health departments according to local reporting requirements.      All results should be assessed in conjunction with clinical presentation and other laboratory markers for clinical management.     FOR PEDIATRIC PATIENTS - copy/paste COVID Guidelines URL to browser: [email or web address]       Urine Microscopic [[id number]]  (Abnormal) Collected: 04/09/25 1600    Lab Status: Final result Specimen: Urine, Clean Catch Updated: 04/09/25 1630     RBC, UA 1-2 /hpf      WBC, UA 1-2 /hpf      Epithelial Cells Occasional /hpf      Bacteria, UA None Seen /hpf      MUCUS THREADS Innumerable     Hyaline Casts, UA 0-3 /lpf     B-Type Natriuretic Peptide(BNP) [[id number]]  (Abnormal) Collected: 04/09/25 1536    Lab Status: Final result Specimen: Blood from Arm, Left Updated: 04/09/25 1621      pg/mL     Comprehensive metabolic panel [[id number]]  (Abnormal) Collected: 04/09/25 1536    Lab Status: Final result Specimen: Blood from Arm, Left Updated: 04/09/25 1616     Sodium 133 mmol/L       Potassium 3.7 mmol/L      Chloride 101 mmol/L      CO2 20 mmol/L      ANION GAP 12 mmol/L      BUN 20 mg/dL      Creatinine 1.05 mg/dL      Glucose 114 mg/dL      Calcium 9.5 mg/dL      AST 25 U/L      ALT 15 U/L      Alkaline Phosphatase 37 U/L      Total Protein 6.6 g/dL      Albumin 4.2 g/dL      Total Bilirubin 0.80 mg/dL      eGFR 71 ml/min/1.73sq m     Narrative:      National Kid[employer] guidelines for Chronic Kidney Disease (CKD):     Stage 1 with normal or high GFR (GFR > 90 mL/min/1.73 square meters)    Stage 2 Mild CKD (GFR = 60-89 mL/min/1.73 square meters)    Stage 3A Moderate CKD (GFR = 45-59 mL/min/1.73 square meters)    Stage 3B Moderate CKD (GFR = 30-44 mL/min/1.73 square meters)    Stage 4 Severe CKD (GFR = 15-29 mL/min/1.73 square meters)    Stage 5 End Stage CKD (GFR <15 mL/min/1.73 square meters)  Note: GFR calculation is accurate only with a steady state creatinine    Phosphorus [[id number]]  (Normal) Collected: 04/09/25 1536    Lab Status: Final result Specimen: Blood from Arm, Left Updated: 04/09/25 1616     Phosphorus 2.6 mg/dL     Lipase [[id number]]  (Abnormal) Collected: 04/09/25 1536    Lab Status: Final result Specimen: Blood from Arm, Left Updated: 04/09/25 1616     Lipase <6 u/L     HS Troponin 0hr (reflex protocol) [[id number]]  (Normal) Collected: 04/09/25 1536    Lab Status: Final result Specimen: Blood from Arm, Left Updated: 04/09/25 1609     hs TnI 0hr 7 ng/L     HS Troponin I 2hr [[id number]]     Lab Status: No result Specimen: Blood     HS Troponin I 4hr [[id number]]     Lab Status: No result Specimen: Blood     Lactic acid, plasma (w/reflex if result > 2.0) [[id number]]  (Normal) Collected: 04/09/25 1536    Lab Status: Final result Specimen: Blood from Arm, Left Updated: 04/09/25 1603     LACTIC ACID 1.4 mmol/L     Narrative:      Result may be elevated if tourniquet was used during collection.    Protime-INR [[id number]]  (Abnormal) Collected: 04/09/25 1536    Lab  Status: Final result Specimen: Blood from Arm, Left Updated: 04/09/25 1603     Protime 16.7 seconds      INR 1.34    Narrative:      INR Therapeutic Range    Indication                                             INR Range      Atrial Fibrillation                                               2.0-3.0  Hypercoagulable State                                    2.0.2.3  Left Ventricular Asist Device                            2.0-3.0  Mechanical Heart Valve                                  -    Aortic(with afib, MI, embolism, HF, LA enlargement,    and/or coagulopathy)                                     2.0-3.0 (2.5-3.5)     Mitral                                                             2.5-3.5  Prosthetic/Bioprosthetic Heart Valve               2.0-3.0  Venous thromboembolism (VTE: VT, PE        2.0-3.0    APTT [[id number]]  (Normal) Collected: 04/09/25 1536    Lab Status: Final result Specimen: Blood from Arm, Left Updated: 04/09/25 1603     PTT 33 seconds     Urine Macroscopic, POC [[id number]]  (Abnormal) Collected: 04/09/25 1600    Lab Status: Final result Specimen: Urine Updated: 04/09/25 1601     Color, UA Yellow     Clarity, UA Clear     pH, UA 6.0     Leukocytes, UA Negative     Nitrite, UA Negative     Protein,  (2+) mg/dl      Glucose, UA Negative mg/dl      Ketones, UA Trace mg/dl      Urobilinogen, UA 0.2 E.U./dl      Bilirubin, UA Small     Occult Blood, UA Negative     Specific Gravity, UA >=1.030    Narrative:      CLINITEK RESULT    CBC and differential [[id number]]  (Abnormal) Collected: 04/09/25 1536    Lab Status: Final result Specimen: Blood from Arm, Left Updated: 04/09/25 1553     WBC 11.11 Thousand/uL      RBC 4.80 Million/uL      Hemoglobin 14.4 g/dL      Hematocrit 42.8 %      MCV 89 fL      MCH 30.0 pg      MCHC 33.6 g/dL      RDW 13.2 %      MPV 10.9 fL      Platelets 337 Thousands/uL     Narrative:      This is an appended report.  These results have been appended to a previously  verified report.    Blood culture #1 [[id number]] Collected: 04/09/25 1536    Lab Status: In process Specimen: Blood from Arm, Left Updated: 04/09/25 1544    Blood culture #2 [[id number]] Collected: 04/09/25 1536    Lab Status: In process Specimen: Blood from Hand, Left Updated: 04/09/25 1544            XR chest 2 views   ED Interpretation by Joey Lemus MD (04/09 1615)   I have reviewed the film, per my independent interpretation : infiltrate LLL. No effusion or ptx. Formal read per radiology.        Final Interpretation by Isauro Madera MD (04/09 1604)      Small left lower lobe airspace opacities more likely infiltrate than atelectasis and should be correlated clinically for evidence of infection.      Follow-up is suggested in 6 to 8 weeks time to ensure resolution.      The study was marked in EPIC for immediate notification.            Workstation performed: [id number]             ECG 12 Lead Documentation Only    Date/Time: 4/9/2025 4:30 PM    Performed by: Joey Lemus MD  Authorized by: Joey Lemus MD    Indications / Diagnosis:  Dyspnea, fever  Patient location:  ED  Interpretation:     Interpretation: non-specific    Rate:     ECG rate assessment: tachycardic    Rhythm:     Rhythm: sinus tachycardia    Ectopy:     Ectopy: none    QRS:     QRS axis:  Left    QRS intervals:  Normal  Conduction:     Conduction: normal    ST segments:     ST segments:  Non-specific  T waves:     T waves: non-specific        ED Medication and Procedure Management   Prior to Admission Medications   Prescriptions Last Dose Informant Patient Reported? Taking?   Blood Glucose Monitoring Suppl (FreeStyle Freedom Lite) w/Device KIT  Self No No   Sig: Use 3 (three) times a day   Continuous Blood Gluc  (FreeStyle Ashley 2 [place]) JOE  Self No No   Sig: Use to scan sensor premeals and at bedtime   Continuous Glucose Sensor (FreeStyle Ashley 2 Sensor) MISC  Self No No   Sig: Apply 1  sensor every 14 days. Use as directed with Freestyle Ashley 2 reader.   Diclofenac Sodium (VOLTAREN) 1 %  Self No No   Sig: Apply 4 g topically 4 (four) times a day   Patient not taking: Reported on 4/9/2025   Fluticasone-Salmeterol (Advair Diskus) 250-50 mcg/dose inhaler  Self No No   Sig: Inhale 1 puff 2 (two) times a day Rinse mouth after use.   Ibrutinib 140 MG CAPS  Self No No   Sig: Take 1 capsule (140 mg total) by mouth daily   Injection Device for Insulin (CeQur Simplicity 2U) JOE  Self No No   Sig: Use 1 patch every 3 days, disp 1 box of 10 patches for 30 days supply   Injection Device for Insulin (CeQur Simplicity ) MISC  Self No No   Sig: Use to insert simplicity device.   Insulin Pen Needle (BD Pen Needle Inez U/F) 32G X 4 MM MISC  Self No No   Sig: Use 3 (three) times a day   Patient not taking: Reported on 4/9/2025   LORazepam (ATIVAN) 0.5 mg tablet  Self No No   Sig: Take 1 tablet (0.5 mg total) by mouth daily as needed for anxiety   Lancets (FREESTYLE) lancets  Self No No   Sig: Use as instructed--test 2 times a day    E 11.9   Lancets (freestyle) lancets  Self No No   Sig: TEST BLOOD SUGAR 3 TIMES A DAY   Tresiba FlexTouch 100 units/mL injection pen  Self No No   Sig: INJECT 12 UNITS SUB-Q DAILY AS DIRECTED.   Vonoprazan Fumarate 20 MG TABS  Self No No   Sig: Take 20 mg by mouth in the morning   acyclovir (ZOVIRAX) 400 MG tablet  Self No No   Sig: TAKE 1 TABLET TWICE A DAY   albuterol (2.5 mg/3 mL) 0.083 % nebulizer solution  Self No No   Sig: Take 3 mL (2.5 mg total) by nebulization every 6 (six) hours as needed for wheezing or shortness of breath   albuterol (Ventolin HFA) 90 mcg/act inhaler  Self No No   Sig: USE 2 INHALATIONS EVERY 6 HOURS AS NEEDED FOR WHEEZING   amLODIPine (NORVASC) 10 mg tablet  Self No No   Sig: TAKE 1 TABLET DAILY   aspirin 81 MG tablet  Self Yes No   Sig: Take 81 mg by mouth daily Every other day   benzonatate (TESSALON PERLES) 100 mg capsule  Self No No   Sig:  Take 1 capsule (100 mg total) by mouth every 8 (eight) hours   benzonatate (TESSALON) 200 MG capsule  Self No No   Sig: TAKE 1 CAPSULE THREE TIMES A DAY AS NEEDED FOR COUGH   Patient not taking: Reported on 2025   citalopram (CeleXA) 40 mg tablet  Self No No   Sig: TAKE 1 TABLET DAILY   fenofibrate (TRICOR) 145 mg tablet  Self No No   Sig: TAKE 1 TABLET DAILY   folic acid (FOLVITE) 1 mg tablet  Self Yes No   Sig: Take 800 mcg by mouth daily    gabapentin (NEURONTIN) 600 MG tablet  Self No No   Sig: TAKE 1 TABLET DAILY   glucose blood (FREESTYLE LITE) test strip  Self No No   Sig: Check blood sugar 3 times a day   insulin degludec (Tresiba FlexTouch) 100 units/mL injection pen  Self No No   Sig: Inject 18 Units under the skin daily at bedtime   insulin lispro (Admelog SoloStar) 100 units/mL injection pen  Self No No   Si-14 units before meals   labetalol (NORMODYNE) 100 mg tablet  Self No No   Sig: Take 1 tablet (100 mg total) by mouth 2 (two) times a day   Patient not taking: Reported on 2025   losartan (COZAAR) 100 MG tablet  Self No No   Sig: TAKE 1 TABLET DAILY   mometasone-formoterol (Dulera) 200-5 MCG/ACT inhaler  Self No No   Sig: Inhale 2 puffs 2 (two) times a day Rinse mouth after use.   multivitamin (THERAGRAN) TABS  Self Yes No   Sig: Take 1 tablet by mouth daily   naloxone (NARCAN) 4 mg/0.1 mL nasal spray  Self No No   Sig: Administer 1 spray into a nostril. If no response after 2-3 minutes, give another dose in the other nostril using a new spray.   obinutuzumab (GAZYVA) 1000 mg/40 mL SOLN  Self Yes No   Sig: Infuse into a venous catheter   oxyCODONE (ROXICODONE) 10 MG TABS  Self No No   Sig: Take 1 tablet (10 mg total) by mouth every 6 (six) hours as needed for moderate pain Max Daily Amount: 40 mg   posaconazole (NOXAFIL) 40 mg/mL suspension  Self Yes No   Sig: Take by mouth daily   Patient not taking: Reported on 2025   predniSONE 5 mg tablet  Self No No   Sig: Take 1 tablet (5 mg  total) by mouth daily      Facility-Administered Medications Last Administration Doses Remaining   bupivacaine (MARCAINE) 0.25 % injection 1 mL 9/9/2024  2:30 PM    lidocaine (XYLOCAINE) 1 % injection 1 mL 9/9/2024  2:30 PM    triamcinolone acetonide (KENALOG-40) 40 mg/mL injection 20 mg 9/9/2024  2:30 PM         Patient's Medications   Discharge Prescriptions    No medications on file     No discharge procedures on file.  ED SEPSIS DOCUMENTATION   Time reflects when diagnosis was documented in both MDM as applicable and the Disposition within this note       Time User Action Codes Description Comment    4/9/2025  4:33 PM Joey Lemus [J18.9] Pneumonia     4/9/2025  4:33 PM Joey Lemus [R65.10] SIRS (systemic inflammatory response syndrome) (HCC)     4/9/2025  4:33 PM Joey Lemus [J44.1] COPD exacerbation (HCC)            Initial Sepsis Screening       Row Name 04/09/25 6318                Is the patient's history suggestive of a new or worsening infection? Yes (Proceed)  -CS        Suspected source of infection pneumonia  -CS        Indicate SIRS criteria Tachycardia > 90 bpm;Tachypnea > 20 resp per min  -CS        Are two or more of the above signs & symptoms of infection both present and new to the patient? Yes (Proceed)  -CS        Assess for evidence of organ dysfunction: Are any of the below criteria present within 6 hours of suspected infection and SIRS criteria that are NOT considered to be chronic conditions? --                  User Key  (r) = Recorded By, (t) = Taken By, (c) = Cosigned By      Initials Name Provider Type    CS Joey Lemus MD Physician                       Joey Lemus MD  04/09/25 1139

## 2025-04-09 NOTE — ASSESSMENT & PLAN NOTE
Currently on Gazyva + Imbruvica.  S/p chlorambucil + rituxan + venetoclax in 2017, and was later started on since been on gazyva and Imbruvica 7/2017 due to inadequate control of the disease for venetoclax.  Followed by Dr. Mejia

## 2025-04-10 PROBLEM — R91.1 PULMONARY NODULE: Status: ACTIVE | Noted: 2025-04-10

## 2025-04-10 LAB
ALBUMIN SERPL BCG-MCNC: 3.8 G/DL (ref 3.5–5)
ALP SERPL-CCNC: 34 U/L (ref 34–104)
ALT SERPL W P-5'-P-CCNC: 13 U/L (ref 7–52)
ANION GAP SERPL CALCULATED.3IONS-SCNC: 9 MMOL/L (ref 4–13)
AST SERPL W P-5'-P-CCNC: 20 U/L (ref 13–39)
BASOPHILS # BLD AUTO: 0.05 THOUSANDS/ÂΜL (ref 0–0.1)
BASOPHILS NFR BLD AUTO: 1 % (ref 0–1)
BILIRUB SERPL-MCNC: 0.42 MG/DL (ref 0.2–1)
BUN SERPL-MCNC: 23 MG/DL (ref 5–25)
CALCIUM SERPL-MCNC: 9 MG/DL (ref 8.4–10.2)
CHLORIDE SERPL-SCNC: 104 MMOL/L (ref 96–108)
CO2 SERPL-SCNC: 24 MMOL/L (ref 21–32)
CREAT SERPL-MCNC: 0.91 MG/DL (ref 0.6–1.3)
EOSINOPHIL # BLD AUTO: 0.02 THOUSAND/ÂΜL (ref 0–0.61)
EOSINOPHIL NFR BLD AUTO: 0 % (ref 0–6)
ERYTHROCYTE [DISTWIDTH] IN BLOOD BY AUTOMATED COUNT: 13.2 % (ref 11.6–15.1)
GFR SERPL CREATININE-BSD FRML MDRD: 85 ML/MIN/1.73SQ M
GLUCOSE P FAST SERPL-MCNC: 193 MG/DL (ref 65–99)
GLUCOSE SERPL-MCNC: 161 MG/DL (ref 65–140)
GLUCOSE SERPL-MCNC: 161 MG/DL (ref 65–140)
GLUCOSE SERPL-MCNC: 180 MG/DL (ref 65–140)
GLUCOSE SERPL-MCNC: 190 MG/DL (ref 65–140)
GLUCOSE SERPL-MCNC: 193 MG/DL (ref 65–140)
GLUCOSE SERPL-MCNC: 204 MG/DL (ref 65–140)
HCT VFR BLD AUTO: 40.8 % (ref 36.5–49.3)
HGB BLD-MCNC: 13.6 G/DL (ref 12–17)
IGG SERPL-MCNC: 318 MG/DL (ref 635–1741)
IMM GRANULOCYTES # BLD AUTO: >0.5 THOUSAND/UL (ref 0–0.2)
IMM GRANULOCYTES NFR BLD AUTO: 11 % (ref 0–2)
L PNEUMO1 AG UR QL IA.RAPID: NEGATIVE
LYMPHOCYTES # BLD AUTO: 0.29 THOUSANDS/ÂΜL (ref 0.6–4.47)
LYMPHOCYTES NFR BLD AUTO: 5 % (ref 14–44)
MAGNESIUM SERPL-MCNC: 2.3 MG/DL (ref 1.9–2.7)
MCH RBC QN AUTO: 30.4 PG (ref 26.8–34.3)
MCHC RBC AUTO-ENTMCNC: 33.3 G/DL (ref 31.4–37.4)
MCV RBC AUTO: 91 FL (ref 82–98)
MONOCYTES # BLD AUTO: 0.46 THOUSAND/ÂΜL (ref 0.17–1.22)
MONOCYTES NFR BLD AUTO: 8 % (ref 4–12)
NEUTROPHILS # BLD AUTO: 4.51 THOUSANDS/ÂΜL (ref 1.85–7.62)
NEUTS SEG NFR BLD AUTO: 75 % (ref 43–75)
NRBC BLD AUTO-RTO: 0 /100 WBCS
PLATELET # BLD AUTO: 259 THOUSANDS/UL (ref 149–390)
PMV BLD AUTO: 11.1 FL (ref 8.9–12.7)
POTASSIUM SERPL-SCNC: 4.2 MMOL/L (ref 3.5–5.3)
PROCALCITONIN SERPL-MCNC: 0.15 NG/ML
PROT SERPL-MCNC: 5.9 G/DL (ref 6.4–8.4)
RBC # BLD AUTO: 4.48 MILLION/UL (ref 3.88–5.62)
SODIUM SERPL-SCNC: 137 MMOL/L (ref 135–147)
WBC # BLD AUTO: 5.97 THOUSAND/UL (ref 4.31–10.16)

## 2025-04-10 PROCEDURE — 87184 SC STD DISK METHOD PER PLATE: CPT | Performed by: STUDENT IN AN ORGANIZED HEALTH CARE EDUCATION/TRAINING PROGRAM

## 2025-04-10 PROCEDURE — 94640 AIRWAY INHALATION TREATMENT: CPT

## 2025-04-10 PROCEDURE — 87070 CULTURE OTHR SPECIMN AEROBIC: CPT | Performed by: STUDENT IN AN ORGANIZED HEALTH CARE EDUCATION/TRAINING PROGRAM

## 2025-04-10 PROCEDURE — 83735 ASSAY OF MAGNESIUM: CPT | Performed by: STUDENT IN AN ORGANIZED HEALTH CARE EDUCATION/TRAINING PROGRAM

## 2025-04-10 PROCEDURE — 99232 SBSQ HOSP IP/OBS MODERATE 35: CPT

## 2025-04-10 PROCEDURE — 80053 COMPREHEN METABOLIC PANEL: CPT | Performed by: STUDENT IN AN ORGANIZED HEALTH CARE EDUCATION/TRAINING PROGRAM

## 2025-04-10 PROCEDURE — 82948 REAGENT STRIP/BLOOD GLUCOSE: CPT

## 2025-04-10 PROCEDURE — 94760 N-INVAS EAR/PLS OXIMETRY 1: CPT

## 2025-04-10 PROCEDURE — 84145 PROCALCITONIN (PCT): CPT | Performed by: STUDENT IN AN ORGANIZED HEALTH CARE EDUCATION/TRAINING PROGRAM

## 2025-04-10 PROCEDURE — 82784 ASSAY IGA/IGD/IGG/IGM EACH: CPT

## 2025-04-10 PROCEDURE — 87185 SC STD ENZYME DETCJ PER NZM: CPT | Performed by: STUDENT IN AN ORGANIZED HEALTH CARE EDUCATION/TRAINING PROGRAM

## 2025-04-10 PROCEDURE — 94664 DEMO&/EVAL PT USE INHALER: CPT

## 2025-04-10 PROCEDURE — 87449 NOS EACH ORGANISM AG IA: CPT | Performed by: STUDENT IN AN ORGANIZED HEALTH CARE EDUCATION/TRAINING PROGRAM

## 2025-04-10 PROCEDURE — 85027 COMPLETE CBC AUTOMATED: CPT | Performed by: STUDENT IN AN ORGANIZED HEALTH CARE EDUCATION/TRAINING PROGRAM

## 2025-04-10 PROCEDURE — 87205 SMEAR GRAM STAIN: CPT | Performed by: STUDENT IN AN ORGANIZED HEALTH CARE EDUCATION/TRAINING PROGRAM

## 2025-04-10 PROCEDURE — 87077 CULTURE AEROBIC IDENTIFY: CPT | Performed by: STUDENT IN AN ORGANIZED HEALTH CARE EDUCATION/TRAINING PROGRAM

## 2025-04-10 RX ORDER — INSULIN LISPRO 100 [IU]/ML
12 INJECTION, SOLUTION INTRAVENOUS; SUBCUTANEOUS
Status: DISCONTINUED | OUTPATIENT
Start: 2025-04-10 | End: 2025-04-13 | Stop reason: HOSPADM

## 2025-04-10 RX ORDER — ALBUTEROL SULFATE 90 UG/1
2 INHALANT RESPIRATORY (INHALATION) EVERY 4 HOURS PRN
Status: DISCONTINUED | OUTPATIENT
Start: 2025-04-10 | End: 2025-04-13 | Stop reason: HOSPADM

## 2025-04-10 RX ORDER — DOXYCYCLINE 100 MG/1
100 CAPSULE ORAL EVERY 12 HOURS SCHEDULED
Status: DISCONTINUED | OUTPATIENT
Start: 2025-04-10 | End: 2025-04-13 | Stop reason: HOSPADM

## 2025-04-10 RX ADMIN — INSULIN LISPRO 1 UNITS: 100 INJECTION, SOLUTION INTRAVENOUS; SUBCUTANEOUS at 09:09

## 2025-04-10 RX ADMIN — DEXTROSE 1000 MG: 50 INJECTION, SOLUTION INTRAVENOUS at 17:21

## 2025-04-10 RX ADMIN — PREDNISONE 5 MG: 5 TABLET ORAL at 09:10

## 2025-04-10 RX ADMIN — INSULIN LISPRO 12 UNITS: 100 INJECTION, SOLUTION INTRAVENOUS; SUBCUTANEOUS at 16:29

## 2025-04-10 RX ADMIN — BENZONATATE 100 MG: 100 CAPSULE ORAL at 11:35

## 2025-04-10 RX ADMIN — INSULIN LISPRO 12 UNITS: 100 INJECTION, SOLUTION INTRAVENOUS; SUBCUTANEOUS at 11:35

## 2025-04-10 RX ADMIN — INSULIN DEGLUDEC 18 UNITS: 100 INJECTION, SOLUTION SUBCUTANEOUS at 21:35

## 2025-04-10 RX ADMIN — FOLIC ACID TAB 400 MCG 800 MCG: 400 TAB at 09:10

## 2025-04-10 RX ADMIN — IBRUTINIB 140 MG: 140 TABLET, FILM COATED ORAL at 09:09

## 2025-04-10 RX ADMIN — ACYCLOVIR 400 MG: 800 TABLET ORAL at 09:10

## 2025-04-10 RX ADMIN — BENZONATATE 100 MG: 100 CAPSULE ORAL at 18:25

## 2025-04-10 RX ADMIN — B-COMPLEX W/ C & FOLIC ACID TAB 1 TABLET: TAB at 09:19

## 2025-04-10 RX ADMIN — ASPIRIN 81 MG: 81 TABLET, COATED ORAL at 09:10

## 2025-04-10 RX ADMIN — BENZONATATE 100 MG: 100 CAPSULE ORAL at 05:38

## 2025-04-10 RX ADMIN — DOXYCYCLINE HYCLATE 100 MG: 100 CAPSULE ORAL at 11:35

## 2025-04-10 RX ADMIN — PANTOPRAZOLE SODIUM 40 MG: 40 TABLET, DELAYED RELEASE ORAL at 05:38

## 2025-04-10 RX ADMIN — DOXYCYCLINE HYCLATE 100 MG: 100 CAPSULE ORAL at 21:34

## 2025-04-10 RX ADMIN — CITALOPRAM HYDROBROMIDE 40 MG: 20 TABLET ORAL at 09:10

## 2025-04-10 RX ADMIN — GABAPENTIN 600 MG: 300 CAPSULE ORAL at 09:10

## 2025-04-10 RX ADMIN — FLUTICASONE FUROATE AND VILANTEROL TRIFENATATE 1 PUFF: 200; 25 POWDER RESPIRATORY (INHALATION) at 09:09

## 2025-04-10 RX ADMIN — ACYCLOVIR 400 MG: 800 TABLET ORAL at 18:25

## 2025-04-10 RX ADMIN — SODIUM CHLORIDE, SODIUM LACTATE, POTASSIUM CHLORIDE, AND CALCIUM CHLORIDE 125 ML/HR: .6; .31; .03; .02 INJECTION, SOLUTION INTRAVENOUS at 05:38

## 2025-04-10 RX ADMIN — IPRATROPIUM BROMIDE AND ALBUTEROL SULFATE 3 ML: .5; 3 SOLUTION RESPIRATORY (INHALATION) at 14:22

## 2025-04-10 RX ADMIN — AMLODIPINE BESYLATE 10 MG: 10 TABLET ORAL at 09:10

## 2025-04-10 RX ADMIN — IPRATROPIUM BROMIDE AND ALBUTEROL SULFATE 3 ML: .5; 3 SOLUTION RESPIRATORY (INHALATION) at 08:16

## 2025-04-10 RX ADMIN — LOSARTAN POTASSIUM 100 MG: 50 TABLET, FILM COATED ORAL at 09:10

## 2025-04-10 RX ADMIN — IPRATROPIUM BROMIDE AND ALBUTEROL SULFATE 3 ML: .5; 3 SOLUTION RESPIRATORY (INHALATION) at 19:15

## 2025-04-10 RX ADMIN — FENOFIBRATE 145 MG: 145 TABLET ORAL at 09:10

## 2025-04-10 NOTE — PROGRESS NOTES
Progress Note - Hospitalist   Name: Cayetano Lucero 69 y.o. male I MRN: [id number]  Unit/Bed#: E5 -01 I Date of Admission: 4/9/2025   Date of Service: 4/10/2025 I Hospital Day: 0    Assessment & Plan  Multifocal pneumonia  Presented with progressive cough, fevers/chills, decreased appetite  Was diagnosed with influenza approximately 1 month ago  Viral panel on admission negative  Met septic criteria on admission, CT chest with multifocal pneumonia  Fortunately without hypoxia  Continue IV Rocephin and doxycycline  Urine strep negative  Follow up urine Legionella and sputum culture  Sepsis without acute organ dysfunction (HCC)  Evident by tachycardia, tachypnea, leukocytosis, source pneumonia  Antibiotics as stated above  Does admit to loose stools over the past 4 days although none today.  Monitor output  Follow-up blood and sputum culture  CAD (coronary artery disease)  Continue aspirin, fenofibrate  CLL (chronic lymphocytic leukemia) (HCC)  Known to Dr. Mejia, patient on ibrutinib and prednisone daily  Also receiving IVIG and Gazyva every 4 weeks  Hyperlipidemia  Continue fenofibrate  Hypertension  BP within acceptable range on admission  Continue PTA regimen: amlodipine 10 mg daily, losartan 100 mg daily  Stage 3 chronic kidney disease, unspecified whether stage 3a or 3b CKD (HCC)  Lab Results   Component Value Date    EGFR 85 04/10/2025    EGFR 71 04/09/2025    EGFR 75 03/17/2025    CREATININE 0.91 04/10/2025    CREATININE 1.05 04/09/2025    CREATININE 1.01 03/17/2025   Creatinine at baseline  Continue to monitor  Gastroesophageal reflux disease without esophagitis  Patient did undergo paraesophageal hernia repair 6/7/2024  Tolerating diet without concerns, although does admit to intermittent dysphagia  He is known to Dr. Gonsales, reviewed office visits and post op follow ups  Of note his CT chest does display persistent distal esophageal thickening  PPI  Chronic obstructive pulmonary disease  (HCC)  With outpatient diagnosis of moderate persistent asthma bronchitis versus COPD  PFTs in 2021 with normal spirometry no bronchodilator response  Continue PTA inhaler, started on DuoNebs  No noted wheezing on exam, continue PTA oral prednisone  Depression, recurrent (HCC)  Continue Celexa  Hypogammaglobulinemia, acquired (HCC)  Receives IVIG as an outpatient  Recheck IgG levels here  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[facility])  Noted, patient is not on heparin products  Long term (current) use of systemic steroids  PTA prednisone 5 mg daily  Type 2 diabetes mellitus with diabetic polyneuropathy, with long-term current use of insulin ([facility])  Lab Results   Component Value Date    HGBA1C 6.6 (A) 01/29/2025     Recent Labs     04/09/25  2122 04/10/25  0705 04/10/25  0824 04/10/25  1120   POCGLU 246* 180* 204* 161*     PTA glargine 18 un at bedtime, 12 units with meals  Patient has CGM and feels more comfortable monitoring this himself, nonformulary order placed  Pulmonary nodule  6 mm left upper lobe groundglass nodule noted on CT  Will need follow up non-contrast in 6-12 months to ensure stability, then additional follow up every 2 years until 5 year stability is demonstrated    VTE Pharmacologic Prophylaxis:   Moderate Risk (Score 3-4) - Pharmacological DVT Prophylaxis Contraindicated. Sequential Compression Devices Ordered.    Mobility:   Basic Mobility Inpatient Raw Score: 24  JH-HLM Goal: 8: Walk 250 feet or more  JH-HLM Achieved: 8: Walk 250 feet ot more  JH-HLM Goal achieved. Continue to encourage appropriate mobility.    Patient Centered Rounds: I performed bedside rounds with nursing staff today.   Discussions with Specialists or Other Care Team Provider: YELENA    Education and Discussions with Family / Patient: Updated  (wife) via phone.    Current Length of Stay: 0 day(s)  Current Patient Status: Observation   Certification Statement: The patient will continue to require additional inpatient  hospital stay due to pna work up  Discharge Plan: Anticipate discharge in 24-48 hrs to home.    Code Status: Level 1 - Full Code    Subjective   Patient seen and examined.  Notes his productive cough is better today and he has not had a good appetite over the past 3 days but was hungry this morning. Does note he had diarrhea the past 4 days but none today, was taking over-the-counter medication he thinks may have been Imodium.  Currently denies any chest pain or shortness of breath.  Denies any fevers or chills here overnight.  Denies any abdominal pain, nausea, any difficulties using the bathroom.  Does note he will intermittently feel like food gets stuck but does feel like this cough is separate from eating.  We did review his hiatal hernia surgery which was done in 2024.  Patient also notes he would like to manage his own insulin and feels comfortable doing so.    Objective :  Temp:  [97.2 °F (36.2 °C)-98.8 °F (37.1 °C)] 97.2 °F (36.2 °C)  HR:  [] 84  BP: (125-156)/(66-89) 156/70  Resp:  [20-24] 20  SpO2:  [92 %-96 %] 94 %  O2 Device: None (Room air)    Body mass index is 27.57 kg/m².     Input and Output Summary (last 24 hours):     Intake/Output Summary (Last 24 hours) at 4/10/2025 1403  Last data filed at 4/10/2025 0957  Gross per 24 hour   Intake 230 ml   Output 1750 ml   Net -1520 ml       Physical Exam  Vitals and nursing note reviewed.   Constitutional:       Appearance: Normal appearance. He is obese. He is not ill-appearing or diaphoretic.   Cardiovascular:      Rate and Rhythm: Normal rate and regular rhythm.   Pulmonary:      Effort: Pulmonary effort is normal. No respiratory distress.      Breath sounds: Rhonchi (coarse BS bases, on room air) present.   Abdominal:      General: Bowel sounds are normal.      Palpations: Abdomen is soft.      Tenderness: There is no abdominal tenderness.   Musculoskeletal:      Right lower leg: No edema.      Left lower leg: No edema.   Skin:     General: Skin is  warm and dry.   Neurological:      Mental Status: He is alert and oriented to person, place, and time. Mental status is at baseline.         Lab Results: I have reviewed the following results:   Results from last 7 days   Lab Units 04/10/25  0545 04/09/25 1931 04/09/25  1536   WBC Thousand/uL 5.97  --  11.11*   HEMOGLOBIN g/dL 13.6  --  14.4   HEMATOCRIT % 40.8  --  42.8   PLATELETS Thousands/uL 259   < > 337   BANDS PCT %  --   --  4   SEGS PCT % 75  --   --    LYMPHO PCT % 5*  --  7*   MONO PCT % 8  --  11   EOS PCT % 0  --  0    < > = values in this interval not displayed.     Results from last 7 days   Lab Units 04/10/25  0435   SODIUM mmol/L 137   POTASSIUM mmol/L 4.2   CHLORIDE mmol/L 104   CO2 mmol/L 24   BUN mg/dL 23   CREATININE mg/dL 0.91   ANION GAP mmol/L 9   CALCIUM mg/dL 9.0   ALBUMIN g/dL 3.8   TOTAL BILIRUBIN mg/dL 0.42   ALK PHOS U/L 34   ALT U/L 13   AST U/L 20   GLUCOSE RANDOM mg/dL 193*     Results from last 7 days   Lab Units 04/09/25  1536   INR  1.34*     Results from last 7 days   Lab Units 04/10/25  1120 04/10/25  0824 04/10/25  0705 04/09/25  2122 04/09/25  1850   POC GLUCOSE mg/dl 161* 204* 180* 246* 152*     Results from last 7 days   Lab Units 04/10/25  0434 04/09/25 1931 04/09/25  1536   LACTIC ACID mmol/L  --   --  1.4   PROCALCITONIN ng/ml 0.15 0.18  --      Recent Cultures (last 7 days):   Results from last 7 days   Lab Units 04/09/25  1536   BLOOD CULTURE  Received in Microbiology Lab. Culture in Progress.  Received in Microbiology Lab. Culture in Progress.     Last 24 Hours Medication List:     Current Facility-Administered Medications:     acetaminophen (TYLENOL) tablet 650 mg, Q6H PRN    acyclovir (ZOVIRAX) tablet 400 mg, BID    albuterol (PROVENTIL HFA,VENTOLIN HFA) inhaler 2 puff, Q4H PRN    amLODIPine (NORVASC) tablet 10 mg, Daily    aspirin (ECOTRIN LOW STRENGTH) EC tablet 81 mg, Daily    benzonatate (TESSALON PERLES) capsule 100 mg, Q8H    cefTRIAXone (ROCEPHIN) 1,000 mg  in dextrose 5 % 50 mL IVPB, Q24H    citalopram (CeleXA) tablet 40 mg, Daily    doxycycline hyclate (VIBRAMYCIN) capsule 100 mg, Q12H OTONIEL    fenofibrate (TRICOR) tablet 145 mg, Daily    fluticasone-vilanterol 200-25 mcg/actuation 1 puff, Daily    folic acid (FOLVITE) tablet 800 mcg, Daily    gabapentin (NEURONTIN) capsule 600 mg, Daily    Ibrutinib TABS 140 mg, Daily    insulin degludec (TRESIBA) 100 units/mL injection pen 18 Units, HS    insulin glargine (LANTUS) subcutaneous injection 18 Units 0.18 mL, HS    insulin lispro (HumaLOG) 100 units/mL subcutaneous injection pen (1-unit dial) 12 Units, TID AC    ipratropium-albuterol (DUO-NEB) 0.5-2.5 mg/3 mL inhalation solution 3 mL, Q6H    LORazepam (ATIVAN) tablet 0.5 mg, Daily PRN    losartan (COZAAR) tablet 100 mg, Daily    multivitamin stress formula tablet 1 tablet, Daily    ondansetron (ZOFRAN) injection 4 mg, Q6H PRN    oxyCODONE (ROXICODONE) immediate release tablet 10 mg, Q6H PRN    pantoprazole (PROTONIX) EC tablet 40 mg, Daily Before Breakfast    predniSONE tablet 5 mg, Daily    Administrative Statements   Today, Patient Was Seen By: Christina Johnson PA-C    **Please Note: This note may have been constructed using a voice recognition system.**

## 2025-04-10 NOTE — ASSESSMENT & PLAN NOTE
6 mm left upper lobe groundglass nodule noted on CT  Will need follow up non-contrast in 6-12 months to ensure stability, then additional follow up every 2 years until 5 year stability is demonstrated

## 2025-04-10 NOTE — ASSESSMENT & PLAN NOTE
BP within acceptable range on admission  Continue PTA regimen: amlodipine 10 mg daily, losartan 100 mg daily

## 2025-04-10 NOTE — ASSESSMENT & PLAN NOTE
Known to Dr. Mejia, patient on ibrutinib and prednisone daily  Also receiving IVIG and Gazyva every 4 weeks

## 2025-04-10 NOTE — ASSESSMENT & PLAN NOTE
Chief Complaint   Patient presents with     Consult     Parkinsons Disease    Damien Zhou CMA       Lab Results   Component Value Date    HGBA1C 6.6 (A) 01/29/2025     Recent Labs     04/09/25  2122 04/10/25  0705 04/10/25  0824 04/10/25  1120   POCGLU 246* 180* 204* 161*     PTA glargine 18 un at bedtime, 12 units with meals  Patient has CGM and feels more comfortable monitoring this himself, nonformulary order placed

## 2025-04-10 NOTE — ASSESSMENT & PLAN NOTE
Patient presents with worsening cough that he endorses has been ongoing for over 1 month however has gotten progressively worse with increased sputum production  CXR showing small left lower lobe airspace opacities/infiltrates   Patient with leukocytosis, endorses fever and chills at home, night sweats, decreased appetite  Received Doxy and rocephin in the ED, continue rocephin for now pending procal  F/u procalcitonin  F/u sputum culture  F/u urine strep and legionella

## 2025-04-10 NOTE — ASSESSMENT & PLAN NOTE
Presented with progressive cough, fevers/chills, decreased appetite  Was diagnosed with influenza approximately 1 month ago  Viral panel on admission negative  Met septic criteria on admission, CT chest with multifocal pneumonia  Fortunately without hypoxia  Continue IV Rocephin and doxycycline  Urine strep negative  Follow up urine Legionella and sputum culture

## 2025-04-10 NOTE — ASSESSMENT & PLAN NOTE
Lab Results   Component Value Date    EGFR 85 04/10/2025    EGFR 71 04/09/2025    EGFR 75 03/17/2025    CREATININE 0.91 04/10/2025    CREATININE 1.05 04/09/2025    CREATININE 1.01 03/17/2025   Creatinine at baseline  Continue to monitor

## 2025-04-10 NOTE — CASE MANAGEMENT
Case Management Assessment & Discharge Planning Note    Patient name Cayetano Lucero  Location [facility] /E5 -* MRN [id number]  : 1955 Date 4/10/2025       Current Admission Date: 2025  Current Admission Diagnosis:Sepsis without acute organ dysfunction ([facility])   Patient Active Problem List    Diagnosis Date Noted Date Diagnosed    Sepsis without acute organ dysfunction ([facility]) 2025     Left lower lobe pulmonary infiltrate 2025     S/P repair of paraesophageal hernia 2024     Low serum IgG for age 04/10/2024     Drug-induced osteoporosis 04/10/2024     Port-A-Cath in place 2024     Type 2 diabetes mellitus with diabetic polyneuropathy, with long-term current use of insulin ([facility]) 2024     Preop cardiovascular exam 2023     Cancer related pain 2023     Long term (current) use of systemic steroids 2022     Depression, recurrent ([facility]) 03/15/2021     Chronic obstructive pulmonary disease ([facility]) 2020     Autoimmune hemolytic anemia ([facility]) 2019     Right upper quadrant abdominal pain 2019     Hypogammaglobulinemia, acquired (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) 2018     Listeria bacteremia 2018     Colitis, acute 2018     Gastroesophageal reflux disease without esophagitis 2018     Headache around the eyes 10/30/2017     Pancytopenia ([facility]) 10/28/2017     Cellulitis of head or scalp 10/08/2017     Leukopenia due to antineoplastic chemotherapy ([facility]) 2017     Hyperglycemia 2017     Ulcer of esophagus without bleeding 2017     Stage 3 chronic kidney disease, unspecified whether stage 3a or 3b CKD ([facility])  08/21/2017     Anemia, chronic disease 03/10/2017     Hemolytic anemia (HCC) 03/06/2017     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) 03/06/2017     H/O blood clots      Candida esophagitis (HCC) 01/18/2017     CLL (chronic lymphocytic leukemia) (HCC)      Hyperlipidemia      Hypertension      History of TIA (transient ischemic attack)      Esophagitis, reflux 04/16/2014     Thrombocytopenia (HCC) 05/01/2013     Chronic lymphocytic leukemia (HCC) 04/18/2013     CAD (coronary artery disease) 01/01/2004       LOS (days): 0  Geometric Mean LOS (GMLOS) (days):   Days to GMLOS:     OBJECTIVE:     Current admission status: Observation    Preferred Pharmacy:   [id number] - [place] - [address]  [address] [place] [address]  Phone: [phone] Fax: [phone]    [facility] - [place], [place] - [address] [place] [address]  [address] [place] [address]  Phone: [phone] Fax: [phone]    [facility] - [place], [place] - [address]  [address]  [place] [address]  Phone: [phone] Fax: [phone]    [facility] - [place], [place] - [id number]  [address]  [id number]  [address]  [place] [id number]  Phone: [phone] Fax: [phone]    [facility] - [place], [place] [facility] access hospital [place] [facility] [address]  [place] [address]  Phone: [phone] Fax: [phone]    Primary Care Provider: Meseret Elliott DO    Primary Insurance: MEDICARE  Secondary Insurance: BLUE CROSS    ASSESSMENT:  Active Health Care Proxies       Danii Lucero Health Care Representative - Spouse   Primary Phone: [phone] (Mobile)            Patient Information  Admitted from:: Home  Mental Status: Alert  During Assessment patient was accompanied by: Not accompanied during assessment  Assessment information provided by:: Patient  Primary Caregiver: Self  Support Systems: Spouse/significant other  County of Residence: [facility]  do you live in?: [place]  Home entry access options. Select all that apply.: No steps to enter home  Type of Current Residence: 2 story home  Upon entering residence, is there a bedroom on the main floor (no further steps)?: No  A bedroom is located on the following floor levels of residence (select all that apply):: 2nd Floor  Upon entering residence, is there a bathroom on the main floor (no further steps)?: No  Indicate which floors of current residence have a bathroom (select all the apply):: 2nd Floor  Number of steps to 2nd floor from main floor: One Flight  Living Arrangements: Lives w/ Spouse/significant other    Activities of Daily Living Prior to Admission  Functional Status: Independent  Completes ADLs independently?: Yes  Ambulates independently?: Yes  Does patient use assisted devices?: No  Does patient currently own DME?: No  Does patient have a history of Outpatient Therapy (PT/OT)?: No  Does the patient have a history of Short-Term Rehab?: No  Does patient have a history of HHC?: Yes  Does patient currently have HHC?: No    Patient Information Continued  Does patient have prescription coverage?: Yes  Can the patient afford their medications and any related supplies (such as glucometers or test strips)?: Yes  Does patient receive dialysis treatments?: No  Does patient have a history of substance abuse?: No  Does patient have a history of Mental Health Diagnosis?: No    Means of Transportation  Means of Transport to [facility]:: Drives Self    DISCHARGE DETAILS:    Discharge planning discussed with:: pt  Freedom of Choice: Yes     CM contacted family/caregiver?: No- see comments (pt is alert and oriented)  Were Treatment Team discharge recommendations reviewed with patient/caregiver?: Yes  Did patient/caregiver verbalize understanding of patient care needs?: Yes  Were patient/caregiver advised of the risks associated with not following Treatment Team discharge recommendations?: Yes    Requested Home  Health Care         Is the patient interested in HHC at discharge?: No    DME Referral Provided  Referral made for DME?: No    Other Referral/Resources/Interventions Provided:  Interventions: None Indicated    Treatment Team Recommendation: Home  Discharge Destination Plan:: Home  Transport at Discharge : Self     Additional Comments: Cm met with pt and reviewed cm role. Pt lives with his SO in a 2STH. If any home services are needed, his physical address is [address] [place] [address]. He is ind with no use of DME. He has had [facility] in the past for home RN. Pt has no hx of outpt PT, MH, STR, or D/A tx hx. Pt reports he drives himself and his car is here so he plans to transport himself home. No anticipated needs at this time.

## 2025-04-10 NOTE — PLAN OF CARE
Problem: Potential for Falls  Goal: Patient will remain free of falls  Description: INTERVENTIONS:- Educate patient/family on patient safety including physical limitations- Instruct patient to call for assistance with activity - Consult OT/PT to assist with strengthening/mobility - Keep Call bell within reach- Keep bed low and locked with side rails adjusted as appropriate- Keep care items and personal belongings within reach- Initiate and maintain comfort rounds- Make Fall Risk Sign visible to staff INTERVENTIONS:- Educate patient/family on patient safety including physical limitations- Instruct patient to call for assistance with activity - Consult OT/PT to assist with strengthening/mobility - Keep Call bell within reach- Keep bed low and locked with side rails adjusted as appropriate- Keep care items and personal belongings within reach- Initiate and maintain comfort rounds- Make Fall Risk Sign visible to staff Consider moving patient to room near nurses station  Outcome: Progressing     Problem: PAIN - ADULT  Goal: Verbalizes/displays adequate comfort level or baseline comfort level  Description: Interventions:- Encourage patient to monitor pain and request assistance- Assess pain using appropriate pain scale- Administer analgesics based on type and severity of pain and evaluate response- Implement non-pharmacological measures as appropriate and evaluate response- Consider cultural and social influences on pain and pain management- Notify physician/advanced practitioner if interventions unsuccessful or patient reports new pain  Outcome: Progressing     Problem: INFECTION - ADULT  Goal: Absence or prevention of progression during hospitalization  Description: INTERVENTIONS:- Assess and monitor for signs and symptoms of infection- Monitor lab/diagnostic results- Monitor all insertion sites, i.e. indwelling lines, tubes, and drains- Monitor endotracheal if appropriate and nasal secretions for changes in amount  and color- [place] appropriate cooling/warming therapies per order- Administer medications as ordered- Instruct and encourage patient and family to use good hand hygiene technique- Identify and instruct in appropriate isolation precautions for identified infection/condition  Outcome: Progressing  Goal: Absence of fever/infection during neutropenic period  Description: INTERVENTIONS:- Monitor WBC  Outcome: Progressing     Problem: SAFETY ADULT  Goal: Patient will remain free of falls  Description: INTERVENTIONS:- Educate patient/family on patient safety including physical limitations- Instruct patient to call for assistance with activity - Consult OT/PT to assist with strengthening/mobility - Keep Call bell within reach- Keep bed low and locked with side rails adjusted as appropriate- Keep care items and personal belongings within reach- Initiate and maintain comfort rounds- Make Fall Risk Sign visible to staff INTERVENTIONS:- Educate patient/family on patient safety including physical limitations- Instruct patient to call for assistance with activity - Consult OT/PT to assist with strengthening/mobility - Keep Call bell within reach- Keep bed low and locked with side rails adjusted as appropriate- Keep care items and personal belongings within reach- Initiate and maintain comfort rounds- Make Fall Risk Sign visible to staff Consider moving patient to room near nurses station  Outcome: Progressing  Goal: Maintain or return to baseline ADL function  Description: INTERVENTIONS:-  Assess patient's ability to carry out ADLs; assess patient's baseline for ADL function and identify physical deficits which impact ability to perform ADLs (bathing, care of mouth/teeth, toileting, grooming, dressing, etc.)- Assess/evaluate cause of self-care deficits - Assess range of motion- Assess patient's mobility; develop plan if impaired- Assess patient's need for assistive devices and provide as appropriate- Encourage maximum  independence but intervene and supervise when necessary- Involve family in performance of ADLs- Assess for home care needs following discharge - Consider OT consult to assist with ADL evaluation and planning for discharge- Provide patient education as appropriate  Outcome: Progressing  Goal: Maintains/Returns to pre admission functional level  Description: INTERVENTIONS:- Perform AM-PAC 6 Click Basic Mobility/ Daily Activity assessment daily.- Set and communicate daily mobility goal to care team and patient/family/caregiver. - Collaborate with rehabilitation services on mobility goals if consulted  Outcome: Progressing     Problem: DISCHARGE PLANNING  Goal: Discharge to home or other facility with appropriate resources  Description: INTERVENTIONS:- Identify barriers to discharge w/patient and caregiver- Arrange for needed discharge resources and transportation as appropriate- Identify discharge learning needs (meds, wound care, etc.)- Arrange for interpretive services to assist at discharge as needed- Refer to Case Management Department for coordinating discharge planning if the patient needs post-hospital services based on physician/advanced practitioner order or complex needs related to functional status, cognitive ability, or social support system  Outcome: Progressing     Problem: Knowledge Deficit  Goal: Patient/family/caregiver demonstrates understanding of disease process, treatment plan, medications, and discharge instructions  Description: Complete learning assessment and assess knowledge base.Interventions:- Provide teaching at level of understanding- Provide teaching via preferred learning methods  Outcome: Progressing     Problem: GASTROINTESTINAL - ADULT  Goal: Minimal or absence of nausea and/or vomiting  Description: INTERVENTIONS:- Administer IV fluids if ordered to ensure adequate hydration- Maintain NPO status until nausea and vomiting are resolved- Nasogastric tube if ordered- Administer ordered  antiemetic medications as needed- Provide nonpharmacologic comfort measures as appropriate- Advance diet as tolerated, if ordered- Consider nutrition services referral to assist patient with adequate nutrition and appropriate food choices  Outcome: Progressing  Goal: Maintains adequate nutritional intake  Description: INTERVENTIONS:- Monitor percentage of each meal consumed- Identify factors contributing to decreased intake, treat as appropriate- Assist with meals as needed- Monitor I&O, weight, and lab values if indicated- Obtain nutrition services referral as needed  Outcome: Progressing     Problem: METABOLIC, FLUID AND ELECTROLYTES - ADULT  Goal: Electrolytes maintained within normal limits  Description: INTERVENTIONS:- Monitor labs and assess patient for signs and symptoms of electrolyte imbalances- Administer electrolyte replacement as ordered- Monitor response to electrolyte replacements, including repeat lab results as appropriate- Instruct patient on fluid and nutrition as appropriate  Outcome: Progressing  Goal: Fluid balance maintained  Description: INTERVENTIONS:- Monitor labs - Monitor I/O and WT- Instruct patient on fluid and nutrition as appropriate- Assess for signs & symptoms of volume excess or deficit  Outcome: Progressing

## 2025-04-10 NOTE — H&P
H&P - Hospitalist   Name: Cayetano Lucero 69 y.o. male I MRN: [id number]  Unit/Bed#: E5 -01 I Date of Admission: 4/9/2025   Date of Service: 4/9/2025 I Hospital Day: 0     Assessment & Plan  Sepsis without acute organ dysfunction (HCC)  POA a/e/b tachycardia, tachypnea, leukocytosis with evidence of possible pulmonary infiltrates in left lower lobe on CXR  Received Rocephin and Doxy in the ED, continue on Rocephin  F/u sputum cultures  F/u urine legionella and urine strep  F/u CT chest as recommended by Pulmonology  F/u procalcitonin  CAD (coronary artery disease)  Continue aspirin, losartan  CLL (chronic lymphocytic leukemia) (HCC)  Continue ibrutinib, prednisone  Hyperlipidemia  Continue fenofibrate  Hypertension  BP within acceptable range on admission  Continue PTA regimen: amlodipine 10 mg daily, losartan 100 mg daily  Stage 3 chronic kidney disease, unspecified whether stage 3a or 3b CKD ([facility])  Lab Results   Component Value Date    EGFR 71 04/09/2025    EGFR 75 03/17/2025    EGFR 69 02/18/2025    CREATININE 1.05 04/09/2025    CREATININE 1.01 03/17/2025    CREATININE 1.08 02/18/2025   Creatinine at baseline  Continue to monitor  Gastroesophageal reflux disease without esophagitis  Continue PPI  Chronic obstructive pulmonary disease (HCC)  Continue PTA inhalers  Duo-nebs  Respiratory protocol  Depression, recurrent (HCC)  Continue celexa  Prn ativan  Long term (current) use of systemic steroids  Continue Prednisone 5 mg daily  Type 2 diabetes mellitus with diabetic polyneuropathy, with long-term current use of insulin ([facility])  Lab Results   Component Value Date    HGBA1C 6.6 (A) 01/29/2025       Recent Labs     04/09/25  1850   POCGLU 152*     Resume PTA regimen: glargine 18 un at bedtime, add on sliding scale and accu-checks    Left lower lobe pulmonary infiltrate  Patient presents with worsening cough that he endorses has been ongoing for over 1 month however has gotten progressively worse with increased  sputum production  CXR showing small left lower lobe airspace opacities/infiltrates   Patient with leukocytosis, endorses fever and chills at home, night sweats, decreased appetite  Received Doxy and rocephin in the ED, continue rocephin for now pending procal  F/u procalcitonin  F/u sputum culture  F/u urine strep and legionella      VTE Pharmacologic Prophylaxis:   Moderate Risk (Score 3-4) - Pharmacological DVT Prophylaxis Contraindicated. Sequential Compression Devices Ordered.  Code Status: Level 1 - Full Code   Discussion with family: Patient declined call to .     Anticipated Length of Stay: Patient will be admitted on an observation basis with an anticipated length of stay of less than 2 midnights secondary to possible pneumonia.    History of Present Illness   Chief Complaint: worsening cough    Cayetano Lucero is a 69 y.o. male with a PMH of CLL, HTN, COPD, GERD, HLD who presents with worsening cough. Per patient, he has been having cough for about a month. He stated that he was previously diagnosed with the flu then with bronchitis however cough never completely resolved. He states that over the past week, the cough has started to become more productive causing him to be up all night long bringing up a lot of mucus and causing him to be unable to sleep. His productive cough is noted to be associated with fevers, chills, night sweats, loss of appetite. Incessant and worsening symptoms prompted the patient to come to the ED.    In the ED, the patient was found to have left lower lobe infiltrates on CXR. He was started on empiric antibiotics, given nebulization and steroids. Patient subsequently admitted for further evaluation and management of sepsis secondary to possible pneumonia.     Review of Systems   Constitutional:  Positive for activity change, appetite change, chills and fever.   HENT:  Negative for ear pain and sore throat.    Eyes:  Negative for pain and visual disturbance.    Respiratory:  Positive for cough. Negative for shortness of breath.    Cardiovascular:  Negative for chest pain and palpitations.   Gastrointestinal:  Negative for abdominal pain and vomiting.   Genitourinary:  Negative for dysuria and hematuria.   Musculoskeletal:  Negative for arthralgias and back pain.   Skin:  Negative for color change and rash.   Neurological:  Negative for seizures and syncope.   All other systems reviewed and are negative.        Historical Information   Past Medical History:   Diagnosis Date    Allergic     Anemia     Arthritis     CAD (coronary artery disease) 2004    Cancer (HCC)     Leukemia    Cellulitis of scalp     CKD (chronic kidney disease) stage 3, GFR 30-59 ml/min (HCC)     CLL (chronic lymphocytic leukemia) (HCC)     COPD (chronic obstructive pulmonary disease) (HCC)     Diabetes mellitus (HCC)     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    Dyslipidemia     Edema extremities     Esophageal ulcer     H/O blood clots     Hemolytic anemia (HCC)     Hiatal hernia     History of TIA (transient ischemic attack)     Hyperlipidemia     Hypertension     Listeria infection 2018    Multiple gastric ulcers     Myocardial infarction (HCC) 2004    acute    Neutropenia (HCC)     Obese     Positive QuantiFERON-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 08/21/2017    Recurrent sinusitis     Thrombocytopenia (HCC)     Vertigo      Past Surgical History:   Procedure Laterality Date    ARTHROSCOPIC REPAIR ACL      lt knee    CARDIAC SURGERY      cardiac cath with stent    FOOT SURGERY      IR PORT CHECK  5/17/2019    KNEE ARTHROSCOPY      OTHER SURGICAL HISTORY      cardiac cath lesion 1, 1st adjunct treat device: stent    PORTACATH PLACEMENT      GA ESOPHAGOGASTRODUODENOSCOPY TRANSORAL DIAGNOSTIC N/A 10/5/2017    Procedure: ESOPHAGOGASTRODUODENOSCOPY (EGD) with bx;  Surgeon: Winifred Hansen DO;  Location: [facility];  Service: Gastroenterology    GA ESOPHAGOGASTRODUODENOSCOPY TRANSORAL DIAGNOSTIC N/A 3/8/2018    Procedure:  ESOPHAGOGASTRODUODENOSCOPY (EGD) with biopsy;  Surgeon: Winifred Hansen DO;  Location: [facility];  Service: Gastroenterology    GA ESOPHAGOGASTRODUODENOSCOPY TRANSORAL DIAGNOSTIC N/A 2018    Procedure: ESOPHAGOGASTRODUODENOSCOPY (EGD) with Dilation;  Surgeon: Winifred Hansen DO;  Location: [facility];  Service: Gastroenterology    GA ESOPHAGOGASTRODUODENOSCOPY TRANSORAL DIAGNOSTIC N/A 10/18/2018    Procedure: ESOPHAGOGASTRODUODENOSCOPY (EGD) with dilation;  Surgeon: Edu Mejía MD;  Location: [facility];  Service: Gastroenterology    GA ESOPHAGOGASTRODUODENOSCOPY TRANSORAL DIAGNOSTIC N/A 2024    Procedure: ESOPHAGOGASTRODUODENOSCOPY (EGD);  Surgeon: Immanuel Gonsales MD;  Location: [facility];  Service: Thoracic    GA ESOPHAGOSCOPY FLEXIBLE TRANSORAL WITH BIOPSY N/A 2016    Procedure: ESOPHAGOGASTRODUODENOSCOPY (EGD); ESOPHAGEAL DILATION; ESOPHAGEAL BIOPSY;  Surgeon: Abdi Holcomb MD;  Location: [facility];  Service: Thoracic    GA LAPS RPR PARAESPHGL HRNA INCL FUNDPLSTY W/O MESH N/A 2024    Procedure: REPAIR HERNIA PARAESOPHAGEAL LAPAROSCOPIC W ROBOTICS TYPE 3. PARTIAL FUNDOPLICATION. MESH.;  Surgeon: Immanuel Gonsales MD;  Location: [facility];  Service: Thoracic    TONSILLECTOMY      UPPER GASTROINTESTINAL ENDOSCOPY      x 6     Social History     Tobacco Use    Smoking status: Former     Current packs/day: 0.00     Average packs/day: 2.0 packs/day for 33.0 years (66.0 ttl pk-yrs)     Types: Cigarettes     Start date:      Quit date:      Years since quittin.2     Passive exposure: Past    Smokeless tobacco: Never   Vaping Use    Vaping status: Never Used   Substance and Sexual Activity    Alcohol use: Yes     Alcohol/week: 2.0 standard drinks of alcohol     Types: 2 Cans of beer per week     Comment: social use as per [employer]    Drug use: Never    Sexual activity: Not on file     E-Cigarette/Vaping    E-Cigarette Use Never User      E-Cigarette/Vaping Substances     Nicotine No     THC No     CBD No     Flavoring No     Other No     Unknown No      Family history non-contributory  Social History:  Marital Status: /Civil Union   Occupation: retired, 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  Prior to Admission medications    Medication Sig Start Date End Date Taking? Authorizing Provider   acyclovir (ZOVIRAX) 400 MG tablet TAKE 1 TABLET TWICE A DAY 8/28/23 4/9/25 Yes Gideon Mejia MD   albuterol (2.5 mg/3 mL) 0.083 % nebulizer solution Take 3 mL (2.5 mg total) by nebulization every 6 (six) hours as needed for wheezing or shortness of breath 8/11/22  Yes Meseret Elliott DO   albuterol (Ventolin HFA) 90 mcg/act inhaler USE 2 INHALATIONS EVERY 6 HOURS AS NEEDED FOR WHEEZING 9/11/24  Yes Meseret Elliott DO   Blood Glucose Monitoring Suppl (FreeStyle Freedom Lite) w/Device KIT Use 3 (three) times a day 8/1/23  Yes Halie No MD   obinutuzumab (GAZYVA) 1000 mg/40 mL SOLN Infuse into a venous catheter   Yes Historical Provider, MD   oxyCODONE (ROXICODONE) 10 MG TABS Take 1 tablet (10 mg total) by mouth every 6 (six) hours as needed for moderate pain Max Daily Amount: 40 mg 11/20/24  Yes Lyndsay Siegel PA-C   amLODIPine (NORVASC) 10 mg tablet TAKE 1 TABLET DAILY 11/13/24   Momo Ba MD   aspirin 81 MG tablet Take 81 mg by mouth daily Every other day    Historical Provider, MD   benzonatate (TESSALON PERLES) 100 mg capsule Take 1 capsule (100 mg total) by mouth every 8 (eight) hours 3/17/25   Rico Hebert MD   benzonatate (TESSALON) 200 MG capsule TAKE 1 CAPSULE THREE TIMES A DAY AS NEEDED FOR COUGH  Patient not taking: Reported on 4/9/2025 1/28/25   Meseret Elliott DO   citalopram (CeleXA) 40 mg tablet TAKE 1 TABLET DAILY 8/30/24   [facility]  DO Lexi   Continuous Blood Gluc  (FreeStyle Ashley 2 [place]) JOE Use to scan sensor premeals and at bedtime 8/14/23   Halie No MD   Continuous Glucose Sensor (FreeStyle Ashley 2 Sensor) MISC Apply 1 sensor every 14 days. Use as directed with Freestyle Ashley 2 reader. 8/15/24   Halie No MD   Diclofenac Sodium (VOLTAREN) 1 % Apply 4 g topically 4 (four) times a day  Patient not taking: Reported on 4/9/2025 6/9/23   Liane Bishop PA-C   fenofibrate (TRICOR) 145 mg tablet TAKE 1 TABLET DAILY 9/11/24   Momo Ba MD   Fluticasone-Salmeterol (Advair Diskus) 250-50 mcg/dose inhaler Inhale 1 puff 2 (two) times a day Rinse mouth after use. 1/6/25 4/9/25  TEODORO Friend   folic acid (FOLVITE) 1 mg tablet Take 800 mcg by mouth daily     Historical Provider, MD   gabapentin (NEURONTIN) 600 MG tablet TAKE 1 TABLET DAILY 1/21/25   Meseret Elliott DO   glucose blood (FREESTYLE LITE) test strip Check blood sugar 3 times a day 12/28/23   Catalina Randolph PA-C   Ibrutinib 140 MG CAPS Take 1 capsule (140 mg total) by mouth daily 3/13/25   Gideon Mejia MD   Injection Device for Insulin (CeQur Simplicity 2U) JOE Use 1 patch every 3 days, disp 1 box of 10 patches for 30 days supply 4/15/24   Catalina Randolph PA-C   Injection Device for Insulin (CeQur Simplicity ) MISC Use to insert simplicity device. 4/15/24   Catalina Randolph PA-C   insulin degludec (Tresiba FlexTouch) 100 units/mL injection pen Inject 18 Units under the skin daily at bedtime 12/12/24   Halie No MD   insulin lispro (Admelog SoloStar) 100 units/mL injection pen 12-14 units before meals 10/3/24   Halie No MD   Insulin Pen Needle (BD Pen Needle Inez U/F) 32G X 4 MM MISC Use 3 (three) times a day  Patient not taking: Reported on 4/9/2025 12/12/24   Halie No MD   labetalol (NORMODYNE) 100 mg tablet Take 1 tablet (100 mg total) by mouth 2 (two) times a day  Patient not taking: Reported on 1/29/2025 11/4/24    Momo Ba MD   Lancets (FREESTYLE) lancets Use as instructed--test 2 times a day    E 11.9 9/20/17   Kevon Vidales MD   Lancets (freestyle) lancets TEST BLOOD SUGAR 3 TIMES A DAY 8/1/23   Halie No MD   LORazepam (ATIVAN) 0.5 mg tablet Take 1 tablet (0.5 mg total) by mouth daily as needed for anxiety 10/3/24   Meseret Elliott DO   losartan (COZAAR) 100 MG tablet TAKE 1 TABLET DAILY 11/5/24   Halie No MD   mometasone-formoterol (Dulera) 200-5 MCG/ACT inhaler Inhale 2 puffs 2 (two) times a day Rinse mouth after use. 2/25/22   Francesca Monroy MD   multivitamin (THERAGRAN) TABS Take 1 tablet by mouth daily    Historical Provider, MD   naloxone (NARCAN) 4 mg/0.1 mL nasal spray Administer 1 spray into a nostril. If no response after 2-3 minutes, give another dose in the other nostril using a new spray. 11/20/24 11/20/25  Lyndsay Siegel PA-C   posaconazole (NOXAFIL) 40 mg/mL suspension Take by mouth daily  Patient not taking: Reported on 2/25/2025    Historical Provider, MD   predniSONE 5 mg tablet Take 1 tablet (5 mg total) by mouth daily 3/24/25   Gideon Mejia MD   Tresiba FlexTouch 100 units/mL injection pen INJECT 12 UNITS SUB-Q DAILY AS DIRECTED. 12/12/24   Halie No MD   Vonoprazan Fumarate 20 MG TABS Take 20 mg by mouth in the morning 4/24/24   Winifred Hansen DO     Allergies   Allergen Reactions    Heparin Other (See Comments)     Other reaction(s): Blood Disorders  clot    Macrolides And Ketolides Other (See Comments)     Interacts with other meds he is taking    Simvastatin Myalgia       Objective :  Temp:  [98 °F (36.7 °C)-102.1 °F (38.9 °C)] 98 °F (36.7 °C)  HR:  [] 100  BP: (106-156)/(73-81) 126/75  Resp:  [22-24] 22  SpO2:  [92 %-93 %] 93 %  O2 Device: None (Room air)    Physical Exam  Vitals and nursing note reviewed.   Constitutional:       General: He is not in acute distress.     Appearance: He is well-developed.   HENT:      Head: Normocephalic and  atraumatic.   Eyes:      Conjunctiva/sclera: Conjunctivae normal.   Cardiovascular:      Rate and Rhythm: Normal rate and regular rhythm.      Heart sounds: No murmur heard.  Pulmonary:      Effort: Pulmonary effort is normal. No respiratory distress.      Breath sounds: Rhonchi present. No wheezing.   Abdominal:      Palpations: Abdomen is soft.      Tenderness: There is no abdominal tenderness.   Musculoskeletal:         General: No swelling.      Cervical back: Neck supple.   Skin:     General: Skin is warm and dry.      Capillary Refill: Capillary refill takes less than 2 seconds.   Neurological:      Mental Status: He is alert.   Psychiatric:         Mood and Affect: Mood normal.        Lines/Drains:    Central Line:  Goal for removal:  for chemo             Lab Results: I have reviewed the following results:  Results from last 7 days   Lab Units 04/09/25  1931 04/09/25  1536   WBC Thousand/uL  --  11.11*   HEMOGLOBIN g/dL  --  14.4   HEMATOCRIT %  --  42.8   PLATELETS Thousands/uL 288 337   BANDS PCT %  --  4   LYMPHO PCT %  --  7*   MONO PCT %  --  11   EOS PCT %  --  0     Results from last 7 days   Lab Units 04/09/25  1536   SODIUM mmol/L 133*   POTASSIUM mmol/L 3.7   CHLORIDE mmol/L 101   CO2 mmol/L 20*   BUN mg/dL 20   CREATININE mg/dL 1.05   ANION GAP mmol/L 12   CALCIUM mg/dL 9.5   ALBUMIN g/dL 4.2   TOTAL BILIRUBIN mg/dL 0.80   ALK PHOS U/L 37   ALT U/L 15   AST U/L 25   GLUCOSE RANDOM mg/dL 114     Results from last 7 days   Lab Units 04/09/25  1536   INR  1.34*     Results from last 7 days   Lab Units 04/09/25  1850   POC GLUCOSE mg/dl 152*     Lab Results   Component Value Date    HGBA1C 6.6 (A) 01/29/2025    HGBA1C 6.4 08/15/2024    HGBA1C 6.8 (H) 04/16/2024     Results from last 7 days   Lab Units 04/09/25  1536   LACTIC ACID mmol/L 1.4       Imaging Results Review: I reviewed radiology reports from this admission including: chest xray.  Other Study Results Review: No additional pertinent studies  reviewed.    Administrative Statements       ** Please Note: This note has been constructed using a voice recognition system. **

## 2025-04-10 NOTE — ASSESSMENT & PLAN NOTE
Patient did undergo paraesophageal hernia repair 6/7/2024  Tolerating diet without concerns, although does admit to intermittent dysphagia  He is known to Dr. Gonsales, reviewed office visits and post op follow ups  Of note his CT chest does display persistent distal esophageal thickening  PPI

## 2025-04-10 NOTE — PLAN OF CARE
Problem: INFECTION - ADULT  Goal: Absence or prevention of progression during hospitalization  Description: INTERVENTIONS:- Assess and monitor for signs and symptoms of infection- Monitor lab/diagnostic results- Monitor all insertion sites, i.e. indwelling lines, tubes, and drains- Monitor endotracheal if appropriate and nasal secretions for changes in amount and color- [place] appropriate cooling/warming therapies per order- Administer medications as ordered- Instruct and encourage patient and family to use good hand hygiene technique- Identify and instruct in appropriate isolation precautions for identified infection/condition  Outcome: Progressing  Goal: Absence of fever/infection during neutropenic period  Description: INTERVENTIONS:- Monitor WBC  Outcome: Progressing     Problem: PAIN - ADULT  Goal: Verbalizes/displays adequate comfort level or baseline comfort level  Description: Interventions:- Encourage patient to monitor pain and request assistance- Assess pain using appropriate pain scale- Administer analgesics based on type and severity of pain and evaluate response- Implement non-pharmacological measures as appropriate and evaluate response- Consider cultural and social influences on pain and pain management- Notify physician/advanced practitioner if interventions unsuccessful or patient reports new pain  Outcome: Progressing

## 2025-04-10 NOTE — ASSESSMENT & PLAN NOTE
With outpatient diagnosis of moderate persistent asthma bronchitis versus COPD  PFTs in 2021 with normal spirometry no bronchodilator response  Continue PTA inhaler, started on DuoNebs  No noted wheezing on exam, continue PTA oral prednisone

## 2025-04-10 NOTE — ASSESSMENT & PLAN NOTE
Evident by tachycardia, tachypnea, leukocytosis, source pneumonia  Antibiotics as stated above  Does admit to loose stools over the past 4 days although none today.  Monitor output  Follow-up blood and sputum culture

## 2025-04-10 NOTE — ASSESSMENT & PLAN NOTE
Lab Results   Component Value Date    HGBA1C 6.6 (A) 01/29/2025       Recent Labs     04/09/25  1850   POCGLU 152*     Resume PTA regimen: glargine 18 un at bedtime, add on sliding scale and accu-checks

## 2025-04-11 ENCOUNTER — APPOINTMENT (INPATIENT)
Dept: NON INVASIVE DIAGNOSTICS | Facility: HOSPITAL | Age: 70
DRG: 871 | End: 2025-04-11
Payer: MEDICARE

## 2025-04-11 PROBLEM — R06.09 DYSPNEA ON EXERTION: Status: ACTIVE | Noted: 2025-04-11

## 2025-04-11 LAB
ANION GAP SERPL CALCULATED.3IONS-SCNC: 6 MMOL/L (ref 4–13)
ANISOCYTOSIS BLD QL SMEAR: PRESENT
BASOPHILS # BLD MANUAL: 0 THOUSAND/UL (ref 0–0.1)
BASOPHILS NFR MAR MANUAL: 0 % (ref 0–1)
BUN SERPL-MCNC: 22 MG/DL (ref 5–25)
BURR CELLS BLD QL SMEAR: PRESENT
CALCIUM SERPL-MCNC: 8.9 MG/DL (ref 8.4–10.2)
CHLORIDE SERPL-SCNC: 109 MMOL/L (ref 96–108)
CO2 SERPL-SCNC: 26 MMOL/L (ref 21–32)
CREAT SERPL-MCNC: 0.98 MG/DL (ref 0.6–1.3)
EOSINOPHIL # BLD MANUAL: 0.04 THOUSAND/UL (ref 0–0.4)
EOSINOPHIL NFR BLD MANUAL: 1 % (ref 0–6)
ERYTHROCYTE [DISTWIDTH] IN BLOOD BY AUTOMATED COUNT: 13.3 % (ref 11.6–15.1)
GFR SERPL CREATININE-BSD FRML MDRD: 77 ML/MIN/1.73SQ M
GIANT PLATELETS BLD QL SMEAR: PRESENT
GLUCOSE SERPL-MCNC: 130 MG/DL (ref 65–140)
GLUCOSE SERPL-MCNC: 197 MG/DL (ref 65–140)
GLUCOSE SERPL-MCNC: 79 MG/DL (ref 65–140)
GLUCOSE SERPL-MCNC: 88 MG/DL (ref 65–140)
GLUCOSE SERPL-MCNC: 90 MG/DL (ref 65–140)
HCT VFR BLD AUTO: 37.2 % (ref 36.5–49.3)
HGB BLD-MCNC: 12.1 G/DL (ref 12–17)
LYMPHOCYTES # BLD AUTO: 1.11 THOUSAND/UL (ref 0.6–4.47)
LYMPHOCYTES # BLD AUTO: 25 % (ref 14–44)
MAGNESIUM SERPL-MCNC: 1.9 MG/DL (ref 1.9–2.7)
MCH RBC QN AUTO: 29.8 PG (ref 26.8–34.3)
MCHC RBC AUTO-ENTMCNC: 32.5 G/DL (ref 31.4–37.4)
MCV RBC AUTO: 92 FL (ref 82–98)
MONOCYTES # BLD AUTO: 0.82 THOUSAND/UL (ref 0–1.22)
MONOCYTES NFR BLD: 20 % (ref 4–12)
NEUTROPHILS # BLD MANUAL: 2.13 THOUSAND/UL (ref 1.85–7.62)
NEUTS BAND NFR BLD MANUAL: 1 % (ref 0–8)
NEUTS HYPERSEG BLD QL SMEAR: PRESENT
NEUTS SEG NFR BLD AUTO: 51 % (ref 43–75)
PLASMA CELLS NFR BLD: 1 % (ref 0–0)
PLATELET # BLD AUTO: 243 THOUSANDS/UL (ref 149–390)
PLATELET BLD QL SMEAR: ADEQUATE
PLATELET CLUMP BLD QL SMEAR: PRESENT
PMV BLD AUTO: 10.8 FL (ref 8.9–12.7)
POLYCHROMASIA BLD QL SMEAR: PRESENT
POTASSIUM SERPL-SCNC: 3.4 MMOL/L (ref 3.5–5.3)
RBC # BLD AUTO: 4.06 MILLION/UL (ref 3.88–5.62)
RBC MORPH BLD: PRESENT
SODIUM SERPL-SCNC: 141 MMOL/L (ref 135–147)
VARIANT LYMPHS # BLD AUTO: 1 %
WBC # BLD AUTO: 4.1 THOUSAND/UL (ref 4.31–10.16)

## 2025-04-11 PROCEDURE — 99223 1ST HOSP IP/OBS HIGH 75: CPT | Performed by: PHYSICIAN ASSISTANT

## 2025-04-11 PROCEDURE — 93306 TTE W/DOPPLER COMPLETE: CPT

## 2025-04-11 PROCEDURE — 85007 BL SMEAR W/DIFF WBC COUNT: CPT

## 2025-04-11 PROCEDURE — 93306 TTE W/DOPPLER COMPLETE: CPT | Performed by: INTERNAL MEDICINE

## 2025-04-11 PROCEDURE — 80048 BASIC METABOLIC PNL TOTAL CA: CPT

## 2025-04-11 PROCEDURE — 82948 REAGENT STRIP/BLOOD GLUCOSE: CPT

## 2025-04-11 PROCEDURE — 83735 ASSAY OF MAGNESIUM: CPT

## 2025-04-11 PROCEDURE — 85027 COMPLETE CBC AUTOMATED: CPT

## 2025-04-11 PROCEDURE — 94640 AIRWAY INHALATION TREATMENT: CPT

## 2025-04-11 PROCEDURE — 94760 N-INVAS EAR/PLS OXIMETRY 1: CPT

## 2025-04-11 PROCEDURE — 99232 SBSQ HOSP IP/OBS MODERATE 35: CPT

## 2025-04-11 PROCEDURE — 30233S1 TRANSFUSION OF NONAUTOLOGOUS GLOBULIN INTO PERIPHERAL VEIN, PERCUTANEOUS APPROACH: ICD-10-PCS | Performed by: INTERNAL MEDICINE

## 2025-04-11 RX ORDER — SODIUM CHLORIDE 9 MG/ML
20 INJECTION, SOLUTION INTRAVENOUS CONTINUOUS
Status: DISPENSED | OUTPATIENT
Start: 2025-04-11 | End: 2025-04-11

## 2025-04-11 RX ORDER — LEVALBUTEROL INHALATION SOLUTION 1.25 MG/3ML
1.25 SOLUTION RESPIRATORY (INHALATION) EVERY 6 HOURS PRN
Status: DISCONTINUED | OUTPATIENT
Start: 2025-04-11 | End: 2025-04-13 | Stop reason: HOSPADM

## 2025-04-11 RX ORDER — ACETAMINOPHEN 325 MG/1
650 TABLET ORAL ONCE
Status: COMPLETED | OUTPATIENT
Start: 2025-04-11 | End: 2025-04-11

## 2025-04-11 RX ORDER — DEXAMETHASONE SODIUM PHOSPHATE 4 MG/ML
4 INJECTION, SOLUTION INTRA-ARTICULAR; INTRALESIONAL; INTRAMUSCULAR; INTRAVENOUS; SOFT TISSUE ONCE
Status: COMPLETED | OUTPATIENT
Start: 2025-04-11 | End: 2025-04-11

## 2025-04-11 RX ORDER — POTASSIUM CHLORIDE 1500 MG/1
20 TABLET, EXTENDED RELEASE ORAL ONCE
Status: COMPLETED | OUTPATIENT
Start: 2025-04-11 | End: 2025-04-11

## 2025-04-11 RX ORDER — IPRATROPIUM BROMIDE AND ALBUTEROL SULFATE 2.5; .5 MG/3ML; MG/3ML
3 SOLUTION RESPIRATORY (INHALATION)
Status: DISCONTINUED | OUTPATIENT
Start: 2025-04-11 | End: 2025-04-13 | Stop reason: HOSPADM

## 2025-04-11 RX ADMIN — CITALOPRAM HYDROBROMIDE 40 MG: 20 TABLET ORAL at 09:16

## 2025-04-11 RX ADMIN — FOLIC ACID TAB 400 MCG 800 MCG: 400 TAB at 09:16

## 2025-04-11 RX ADMIN — DOXYCYCLINE HYCLATE 100 MG: 100 CAPSULE ORAL at 09:16

## 2025-04-11 RX ADMIN — PANTOPRAZOLE SODIUM 40 MG: 40 TABLET, DELAYED RELEASE ORAL at 06:45

## 2025-04-11 RX ADMIN — BENZONATATE 100 MG: 100 CAPSULE ORAL at 05:28

## 2025-04-11 RX ADMIN — DOXYCYCLINE HYCLATE 100 MG: 100 CAPSULE ORAL at 21:21

## 2025-04-11 RX ADMIN — Medication 35 G: at 12:01

## 2025-04-11 RX ADMIN — IPRATROPIUM BROMIDE AND ALBUTEROL SULFATE 3 ML: .5; 3 SOLUTION RESPIRATORY (INHALATION) at 19:57

## 2025-04-11 RX ADMIN — IPRATROPIUM BROMIDE AND ALBUTEROL SULFATE 3 ML: .5; 3 SOLUTION RESPIRATORY (INHALATION) at 13:19

## 2025-04-11 RX ADMIN — DEXTROSE 1000 MG: 50 INJECTION, SOLUTION INTRAVENOUS at 17:24

## 2025-04-11 RX ADMIN — BENZONATATE 100 MG: 100 CAPSULE ORAL at 13:48

## 2025-04-11 RX ADMIN — SODIUM CHLORIDE 20 ML/HR: 0.9 INJECTION, SOLUTION INTRAVENOUS at 10:54

## 2025-04-11 RX ADMIN — ACETAMINOPHEN 650 MG: 325 TABLET, FILM COATED ORAL at 10:53

## 2025-04-11 RX ADMIN — ASPIRIN 81 MG: 81 TABLET, COATED ORAL at 09:16

## 2025-04-11 RX ADMIN — IBRUTINIB 140 MG: 140 TABLET, FILM COATED ORAL at 09:17

## 2025-04-11 RX ADMIN — IPRATROPIUM BROMIDE AND ALBUTEROL SULFATE 3 ML: .5; 3 SOLUTION RESPIRATORY (INHALATION) at 07:19

## 2025-04-11 RX ADMIN — FLUTICASONE FUROATE AND VILANTEROL TRIFENATATE 1 PUFF: 200; 25 POWDER RESPIRATORY (INHALATION) at 09:17

## 2025-04-11 RX ADMIN — DEXAMETHASONE SODIUM PHOSPHATE 4 MG: 4 INJECTION INTRA-ARTICULAR; INTRALESIONAL; INTRAMUSCULAR; INTRAVENOUS; SOFT TISSUE at 10:53

## 2025-04-11 RX ADMIN — DIPHENHYDRAMINE HYDROCHLORIDE 12.5 MG: 50 INJECTION, SOLUTION INTRAMUSCULAR; INTRAVENOUS at 10:53

## 2025-04-11 RX ADMIN — B-COMPLEX W/ C & FOLIC ACID TAB 1 TABLET: TAB at 09:18

## 2025-04-11 RX ADMIN — PREDNISONE 5 MG: 5 TABLET ORAL at 09:16

## 2025-04-11 RX ADMIN — GABAPENTIN 600 MG: 300 CAPSULE ORAL at 09:16

## 2025-04-11 RX ADMIN — AMLODIPINE BESYLATE 10 MG: 10 TABLET ORAL at 09:16

## 2025-04-11 RX ADMIN — BENZONATATE 100 MG: 100 CAPSULE ORAL at 21:21

## 2025-04-11 RX ADMIN — ACYCLOVIR 400 MG: 800 TABLET ORAL at 17:24

## 2025-04-11 RX ADMIN — POTASSIUM CHLORIDE 20 MEQ: 1500 TABLET, EXTENDED RELEASE ORAL at 09:16

## 2025-04-11 RX ADMIN — FENOFIBRATE 145 MG: 145 TABLET ORAL at 09:41

## 2025-04-11 RX ADMIN — SODIUM CHLORIDE 20 ML/HR: 0.9 INJECTION, SOLUTION INTRAVENOUS at 13:47

## 2025-04-11 RX ADMIN — LOSARTAN POTASSIUM 100 MG: 50 TABLET, FILM COATED ORAL at 09:16

## 2025-04-11 RX ADMIN — ACYCLOVIR 400 MG: 800 TABLET ORAL at 09:17

## 2025-04-11 RX ADMIN — IPRATROPIUM BROMIDE AND ALBUTEROL SULFATE 3 ML: .5; 3 SOLUTION RESPIRATORY (INHALATION) at 02:14

## 2025-04-11 RX ADMIN — INSULIN LISPRO 12 UNITS: 100 INJECTION, SOLUTION INTRAVENOUS; SUBCUTANEOUS at 16:06

## 2025-04-11 NOTE — ASSESSMENT & PLAN NOTE
CT scan showing multifocal pneumonia.   Patient initially presented with sepsis criteria which is now improving  On antibiotics per primary team.  Saturating on room air.

## 2025-04-11 NOTE — ASSESSMENT & PLAN NOTE
Lab Results   Component Value Date    EGFR 77 04/11/2025    EGFR 85 04/10/2025    EGFR 71 04/09/2025    CREATININE 0.98 04/11/2025    CREATININE 0.91 04/10/2025    CREATININE 1.05 04/09/2025   Creatinine at baseline  Continue to monitor

## 2025-04-11 NOTE — CONSULTS
Consultation - Oncology-Medical   Name: Cayetano Lucero 69 y.o. male I MRN: [id number]  Unit/Bed#: E5 -01 I Date of Admission: 4/9/2025   Date of Service: 4/11/2025 I Hospital Day: 1   Inpatient consult to Hematology  Consult performed by: Amarilis Ng PA-C  Consult ordered by: Christina Johnson PA-C        Physician Requesting Evaluation: Mayelin Reese DO   Reason for Evaluation / Principal Problem: hypogammaglobulinemia, acute infection, CLL    Assessment & Plan  Multifocal pneumonia  CT scan showing multifocal pneumonia.   Patient initially presented with sepsis criteria which is now improving  On antibiotics per primary team.  Saturating on room air.  Hypogammaglobulinemia, acquired (HCC)  The patient receives IVIG infusions Q28 days in the OP setting with goal IGG >500.   He missed most recent infusions due to illness.   Now presents with acute infection.    IgG is 318 on 4/10  Will give 400mg/kg IVIG x1 here. Premeds ordered.   CLL (chronic lymphocytic leukemia) ([facility])  Pt with longstanding CLL under the care of Dr Mejia  Maintained on daily 140mg ibrutinib. Also takes daily prednisone 5MG, folic acid, and acyclovir, continued here.   Receives obinutuzumab (Gazyva) Q28 days.   I sent a message to Cayetano's primary oncology team notifying them that Cayetano is in the hospital; his next cycle is scheduled for 4/15 and may need to be rescheduled.   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[facility])  Avoid heparin products and Lovenox.     Please contact the SecureChat role for the Oncology-Medical service with any questions/concerns.    History of Present Illness   Cayetano Lucero is a 69 y.o. male who presents with cough and malaise.  The patient has past medical history of CLL and COPD.  He follows with Dr. Mejia in the outpatient setting and his CLL is currently under good control.  See oncology history below.  He also does monthly IVIG infusions however was not able to receive his most recent infusions  due to prolonged course of the flu and bronchitis.  Over the last several weeks the patient noticed increased dyspnea with exertion.  He saw pulmonology however he was acutely ill during that visit and was referred to the ED.  CT imaging shows multifocal pneumonia.  He initially met sepsis criteria.  He was admitted for further workup and care.  IgG levels were checked and were in the 300s.  Hematology oncology consulted for continuity and assistance in the management of hypogammaglobulinemia.     Review of Systems   Constitutional:  Positive for fatigue. Negative for fever.   Respiratory:  Positive for cough, chest tightness and shortness of breath (SoB is improving compared to admission).    Gastrointestinal:  Negative for abdominal distention and abdominal pain.   Psychiatric/Behavioral:  Negative for agitation and behavioral problems.      Historical Information   Oncology History:   Cancer Staging   No matching staging information was found for the patient.    Oncology History   CLL (chronic lymphocytic leukemia) ([facility])   8/22/2017 -  Chemotherapy    chlorambucil (LEUKERAN), 0.5 mg/kg, Oral, Every 14 days, 6 of 6 cycles  obinutuzumab (GAZYVA) day 1 IVPB, 100 mg, Intravenous, Once, 1 of 1 cycle  obinutuzumab (GAZYVA) day 2 titrated infusion, 900 mg, Intravenous, Once, 1 of 1 cycle  obinutuzumab (GAZYVA) subsequent titrated infusion, 1,000 mg, Intravenous, Once, 78 of 86 cycles  Administration: 1,000 mg (5/21/2019), 1,000 mg (6/18/2019), 1,000 mg (7/16/2019), 1,000 mg (8/13/2019), 1,000 mg (9/10/2019), 1,000 mg (10/8/2019), 1,000 mg (11/5/2019), 1,000 mg (12/3/2019), 1,000 mg (12/31/2019), 1,000 mg (1/28/2020), 1,000 mg (2/25/2020), 1,000 mg (3/24/2020), 1,000 mg (4/21/2020), 1,000 mg (5/19/2020), 1,000 mg (6/16/2020), 1,000 mg (7/14/2020), 1,000 mg (8/11/2020), 1,000 mg (9/9/2020), 1,000 mg (10/6/2020), 1,000 mg (11/3/2020), 1,000 mg (12/1/2020), 1,000 mg (1/26/2021), 1,000 mg (12/29/2020), 1,000 mg (2/23/2021),  1,000 mg (3/23/2021), 1,000 mg (4/20/2021), 1,000 mg (5/18/2021), 1,000 mg (6/15/2021), 1,000 mg (7/13/2021), 1,000 mg (8/10/2021), 1,000 mg (9/7/2021), 1,000 mg (10/5/2021), 1,000 mg (11/2/2021), 1,000 mg (11/30/2021), 1,000 mg (12/28/2021), 1,000 mg (1/25/2022), 1,000 mg (2/22/2022), 1,000 mg (3/22/2022), 1,000 mg (5/17/2022), 1,000 mg (6/14/2022), 1,000 mg (7/12/2022), 1,000 mg (8/9/2022), 1,000 mg (9/6/2022), 1,000 mg (10/4/2022), 1,000 mg (11/1/2022), 1,000 mg (11/29/2022), 1,000 mg (1/24/2023), 1,000 mg (2/21/2023), 1,000 mg (3/21/2023), 1,000 mg (4/18/2023), 1,000 mg (5/16/2023), 1,000 mg (6/13/2023), 1,000 mg (7/11/2023), 1,000 mg (8/8/2023), 1,000 mg (9/5/2023), 1,000 mg (10/3/2023), 1,000 mg (10/31/2023), 1,000 mg (11/28/2023), 1,000 mg (1/23/2024), 1,000 mg (2/20/2024), 1,000 mg (3/19/2024), 1,000 mg (4/16/2024), 1,000 mg (5/14/2024), 1,000 mg (7/9/2024), 1,000 mg (11/26/2024), 1,000 mg (8/6/2024), 1,000 mg (9/3/2024), 1,000 mg (10/1/2024), 1,000 mg (10/29/2024), 1,000 mg (12/24/2024), 1,000 mg (1/21/2025), 1,000 mg (2/18/2025)     4/24/2018 Initial Diagnosis    CLL (chronic lymphocytic leukemia) ([facility])       Current Facility-Administered Medications   Medication Dose Route Frequency Provider Last Rate Last Admin    acetaminophen (TYLENOL) tablet 650 mg  650 mg Oral Q6H PRN Farzaneh Johnson MD        acyclovir (ZOVIRAX) tablet 400 mg  400 mg Oral BID Farzaneh Johnson MD   400 mg at 04/10/25 1825    albuterol (PROVENTIL HFA,VENTOLIN HFA) inhaler 2 puff  2 puff Inhalation Q4H PRN Farzaneh Johnson MD        amLODIPine (NORVASC) tablet 10 mg  10 mg Oral Daily Farzaneh Johnson MD   10 mg at 04/10/25 0910    aspirin (ECOTRIN LOW STRENGTH) EC tablet 81 mg  81 mg Oral Daily Farzaneh Johnson MD   81 mg at 04/10/25 0910    benzonatate (TESSALON PERLES) capsule 100 mg  100 mg Oral Q8H Farzaneh Choi  MD Alex   100 mg at 04/11/25 0528    cefTRIAXone (ROCEPHIN) 1,000 mg in dextrose 5 % 50 mL IVPB  1,000 mg Intravenous Q24H Farzaneh Johnson  mL/hr at 04/10/25 1721 1,000 mg at 04/10/25 1721    citalopram (CeleXA) tablet 40 mg  40 mg Oral Daily Farzaneh Johnson MD   40 mg at 04/10/25 0910    doxycycline hyclate (VIBRAMYCIN) capsule 100 mg  100 mg Oral Q12H [facility] Christina Johnson PA-C   100 mg at 04/10/25 2134    fenofibrate (TRICOR) tablet 145 mg  145 mg Oral Daily Farzaneh Johnson MD   145 mg at 04/10/25 0910    fluticasone-vilanterol 200-25 mcg/actuation 1 puff  1 puff Inhalation Daily Farzaneh Johnson MD   1 puff at 04/10/25 0909    folic acid (FOLVITE) tablet 800 mcg  800 mcg Oral Daily Farzaneh Johnson MD   800 mcg at 04/10/25 0910    gabapentin (NEURONTIN) capsule 600 mg  600 mg Oral Daily Farzaneh Johnson MD   600 mg at 04/10/25 0910    Ibrutinib TABS 140 mg  140 mg Oral Daily Farzaneh Johnson MD   140 mg at 04/10/25 0909    insulin degludec (TRESIBA) 100 units/mL injection pen 18 Units  18 Units Subcutaneous HS Christina Johnson PA-C   18 Units at 04/10/25 2135    insulin glargine (LANTUS) subcutaneous injection 18 Units 0.18 mL  18 Units Subcutaneous HS Christina Johnson PA-C   18 Units at 04/09/25 2149    insulin lispro (HumaLOG) 100 units/mL subcutaneous injection pen (1-unit dial) 12 Units  12 Units Subcutaneous TID AC Christina Johnson PA-C   12 Units at 04/10/25 1629    ipratropium-albuterol (DUO-NEB) 0.5-2.5 mg/3 mL inhalation solution 3 mL  3 mL Nebulization Q6H Farzaneh Johnson MD   3 mL at 04/11/25 0719    LORazepam (ATIVAN) tablet 0.5 mg  0.5 mg Oral Daily PRN Farzaneh Johnson MD        losartan (COZAAR) tablet 100 mg  100 mg Oral Daily Farzaneh Johnson MD   100 mg at 04/10/25 0910     multivitamin stress formula tablet 1 tablet  1 tablet Oral Daily Farzaneh Johnson MD   1 tablet at 04/10/25 0919    ondansetron (ZOFRAN) injection 4 mg  4 mg Intravenous Q6H PRN Farzaneh Johnson MD        oxyCODONE (ROXICODONE) immediate release tablet 10 mg  10 mg Oral Q6H PRN Farzaneh Johnson MD        pantoprazole (PROTONIX) EC tablet 40 mg  40 mg Oral Daily Before Breakfast Farzaneh Johnson MD   40 mg at 04/11/25 0645    potassium chloride (Klor-Con M20) CR tablet 20 mEq  20 mEq Oral Once Christina Johnson PA-C        predniSONE tablet 5 mg  5 mg Oral Daily Farzaneh Johnson MD   5 mg at 04/10/25 0910       Objective :  Temp:  [97.9 °F (36.6 °C)-98 °F (36.7 °C)] 98 °F (36.7 °C)  HR:  [] 71  BP: (111-168)/(70-92) 162/80  Resp:  [17-18] 18  SpO2:  [91 %-98 %] 93 %  O2 Device: None (Room air)    Physical Exam  Vitals and nursing note reviewed. Exam conducted with a chaperone present.   Constitutional:       General: He is not in acute distress.     Appearance: He is well-developed.      Comments: Pleasant and fully conversational. No acute distress. Occasional coughing attacks.    HENT:      Head: Normocephalic and atraumatic.   Eyes:      Conjunctiva/sclera: Conjunctivae normal.   Cardiovascular:      Rate and Rhythm: Normal rate.   Pulmonary:      Effort: Pulmonary effort is normal. No respiratory distress.   Musculoskeletal:         General: No swelling.      Cervical back: Neck supple.   Skin:     General: Skin is warm and dry.   Neurological:      Mental Status: He is alert.   Psychiatric:         Mood and Affect: Mood normal.         Behavior: Behavior normal.         Lab Results: I have reviewed the following results:  Lab Results   Component Value Date     12/29/2015    K 3.4 (L) 04/11/2025     (H) 04/11/2025    CO2 26 04/11/2025    ANIONGAP 8 12/29/2015    BUN 22 04/11/2025    CREATININE  0.98 04/11/2025    GLUCOSE 109 12/29/2015    GLUF 193 (H) 04/10/2025    CALCIUM 8.9 04/11/2025    CORRECTEDCA 10.0 03/23/2021    AST 20 04/10/2025    ALT 13 04/10/2025    ALKPHOS 34 04/10/2025    PROT 5.9 (L) 12/29/2015    BILITOT 0.7 12/29/2015    EGFR 77 04/11/2025     Lab Results   Component Value Date    WBC 4.10 (L) 04/11/2025    HGB 12.1 04/11/2025    HCT 37.2 04/11/2025    MCV 92 04/11/2025     04/11/2025     Lab Results   Component Value Date    NEUTROABS 4.51 04/10/2025       Imaging Results Review: I reviewed radiology reports from this admission including: CT chest.

## 2025-04-11 NOTE — ASSESSMENT & PLAN NOTE
Pt with longstanding CLL under the care of Dr Mejia  Maintained on daily 140mg ibrutinib. Also takes daily prednisone 5MG, folic acid, and acyclovir, continued here.   Receives obinutuzumab (Gazyva) Q28 days.   I sent a message to Cayetano's primary oncology team notifying them that Cayetano is in the hospital; his next cycle is scheduled for 4/15 and may need to be rescheduled.

## 2025-04-11 NOTE — ASSESSMENT & PLAN NOTE
Suspect this could be multifactorial in setting of underlying COPD with multifocal pneumonia although patient notes this has been ongoing prior to influenza diagnosis  No exertional or resting chest pain  No clinical signs or symptoms of heart failure  Of note patient does have remote history of CAD with stent, last echocardiogram with EF 60% in 2018  Will update echocardiogram here

## 2025-04-11 NOTE — ASSESSMENT & PLAN NOTE
Continue amlodipine 10 mg daily, losartan 100 mg daily  Blood pressure this AM sub optimally controlled, check after morning medications

## 2025-04-11 NOTE — ASSESSMENT & PLAN NOTE
Receives IVIG as an outpatient  IgG levels here 318  Consult hematology to discuss IVIG infusion here

## 2025-04-11 NOTE — PLAN OF CARE
Problem: PAIN - ADULT  Goal: Verbalizes/displays adequate comfort level or baseline comfort level  Description: Interventions:- Encourage patient to monitor pain and request assistance- Assess pain using appropriate pain scale- Administer analgesics based on type and severity of pain and evaluate response- Implement non-pharmacological measures as appropriate and evaluate response- Consider cultural and social influences on pain and pain management- Notify physician/advanced practitioner if interventions unsuccessful or patient reports new pain  Outcome: Progressing     Problem: INFECTION - ADULT  Goal: Absence or prevention of progression during hospitalization  Description: INTERVENTIONS:- Assess and monitor for signs and symptoms of infection- Monitor lab/diagnostic results- Monitor all insertion sites, i.e. indwelling lines, tubes, and drains- Monitor endotracheal if appropriate and nasal secretions for changes in amount and color- [place] appropriate cooling/warming therapies per order- Administer medications as ordered- Instruct and encourage patient and family to use good hand hygiene technique- Identify and instruct in appropriate isolation precautions for identified infection/condition  Outcome: Progressing  Goal: Absence of fever/infection during neutropenic period  Description: INTERVENTIONS:- Monitor WBC  Outcome: Progressing     Problem: GASTROINTESTINAL - ADULT  Goal: Minimal or absence of nausea and/or vomiting  Description: INTERVENTIONS:- Administer IV fluids if ordered to ensure adequate hydration- Maintain NPO status until nausea and vomiting are resolved- Nasogastric tube if ordered- Administer ordered antiemetic medications as needed- Provide nonpharmacologic comfort measures as appropriate- Advance diet as tolerated, if ordered- Consider nutrition services referral to assist patient with adequate nutrition and appropriate food choices  Outcome: Progressing  Goal: Maintains adequate  nutritional intake  Description: INTERVENTIONS:- Monitor percentage of each meal consumed- Identify factors contributing to decreased intake, treat as appropriate- Assist with meals as needed- Monitor I&O, weight, and lab values if indicated- Obtain nutrition services referral as needed  Outcome: Progressing

## 2025-04-11 NOTE — PLAN OF CARE
Problem: Potential for Falls  Goal: Patient will remain free of falls  Description: INTERVENTIONS:- Educate patient/family on patient safety including physical limitations- Instruct patient to call for assistance with activity - Consult OT/PT to assist with strengthening/mobility - Keep Call bell within reach- Keep bed low and locked with side rails adjusted as appropriate- Keep care items and personal belongings within reach- Initiate and maintain comfort rounds- Make Fall Risk Sign visible to staff INTERVENTIONS:- Educate patient/family on patient safety including physical limitations- Instruct patient to call for assistance with activity - Consult OT/PT to assist with strengthening/mobility - Keep Call bell within reach- Keep bed low and locked with side rails adjusted as appropriate- Keep care items and personal belongings within reach- Initiate and maintain comfort rounds- Make Fall Risk Sign visible to staff Consider moving patient to room near nurses station  Outcome: Progressing     Problem: PAIN - ADULT  Goal: Verbalizes/displays adequate comfort level or baseline comfort level  Description: Interventions:- Encourage patient to monitor pain and request assistance- Assess pain using appropriate pain scale- Administer analgesics based on type and severity of pain and evaluate response- Implement non-pharmacological measures as appropriate and evaluate response- Consider cultural and social influences on pain and pain management- Notify physician/advanced practitioner if interventions unsuccessful or patient reports new pain  Outcome: Progressing     Problem: INFECTION - ADULT  Goal: Absence or prevention of progression during hospitalization  Description: INTERVENTIONS:- Assess and monitor for signs and symptoms of infection- Monitor lab/diagnostic results- Monitor all insertion sites, i.e. indwelling lines, tubes, and drains- Monitor endotracheal if appropriate and nasal secretions for changes in amount  and color- [place] appropriate cooling/warming therapies per order- Administer medications as ordered- Instruct and encourage patient and family to use good hand hygiene technique- Identify and instruct in appropriate isolation precautions for identified infection/condition  Outcome: Progressing  Goal: Absence of fever/infection during neutropenic period  Description: INTERVENTIONS:- Monitor WBC  Outcome: Progressing     Problem: SAFETY ADULT  Goal: Patient will remain free of falls  Description: INTERVENTIONS:- Educate patient/family on patient safety including physical limitations- Instruct patient to call for assistance with activity - Consult OT/PT to assist with strengthening/mobility - Keep Call bell within reach- Keep bed low and locked with side rails adjusted as appropriate- Keep care items and personal belongings within reach- Initiate and maintain comfort rounds- Make Fall Risk Sign visible to staff INTERVENTIONS:- Educate patient/family on patient safety including physical limitations- Instruct patient to call for assistance with activity - Consult OT/PT to assist with strengthening/mobility - Keep Call bell within reach- Keep bed low and locked with side rails adjusted as appropriate- Keep care items and personal belongings within reach- Initiate and maintain comfort rounds- Make Fall Risk Sign visible to staff Consider moving patient to room near nurses station  Outcome: Progressing  Goal: Maintain or return to baseline ADL function  Description: INTERVENTIONS:-  Assess patient's ability to carry out ADLs; assess patient's baseline for ADL function and identify physical deficits which impact ability to perform ADLs (bathing, care of mouth/teeth, toileting, grooming, dressing, etc.)- Assess/evaluate cause of self-care deficits - Assess range of motion- Assess patient's mobility; develop plan if impaired- Assess patient's need for assistive devices and provide as appropriate- Encourage maximum  independence but intervene and supervise when necessary- Involve family in performance of ADLs- Assess for home care needs following discharge - Consider OT consult to assist with ADL evaluation and planning for discharge- Provide patient education as appropriate  Outcome: Progressing  Goal: Maintains/Returns to pre admission functional level  Description: INTERVENTIONS:- Perform AM-PAC 6 Click Basic Mobility/ Daily Activity assessment daily.- Set and communicate daily mobility goal to care team and patient/family/caregiver. - Collaborate with rehabilitation services on mobility goals if consulted  Outcome: Progressing     Problem: DISCHARGE PLANNING  Goal: Discharge to home or other facility with appropriate resources  Description: INTERVENTIONS:- Identify barriers to discharge w/patient and caregiver- Arrange for needed discharge resources and transportation as appropriate- Identify discharge learning needs (meds, wound care, etc.)- Arrange for interpretive services to assist at discharge as needed- Refer to Case Management Department for coordinating discharge planning if the patient needs post-hospital services based on physician/advanced practitioner order or complex needs related to functional status, cognitive ability, or social support system  Outcome: Progressing     Problem: Knowledge Deficit  Goal: Patient/family/caregiver demonstrates understanding of disease process, treatment plan, medications, and discharge instructions  Description: Complete learning assessment and assess knowledge base.Interventions:- Provide teaching at level of understanding- Provide teaching via preferred learning methods  Outcome: Progressing     Problem: GASTROINTESTINAL - ADULT  Goal: Minimal or absence of nausea and/or vomiting  Description: INTERVENTIONS:- Administer IV fluids if ordered to ensure adequate hydration- Maintain NPO status until nausea and vomiting are resolved- Nasogastric tube if ordered- Administer ordered  antiemetic medications as needed- Provide nonpharmacologic comfort measures as appropriate- Advance diet as tolerated, if ordered- Consider nutrition services referral to assist patient with adequate nutrition and appropriate food choices  Outcome: Progressing  Goal: Maintains adequate nutritional intake  Description: INTERVENTIONS:- Monitor percentage of each meal consumed- Identify factors contributing to decreased intake, treat as appropriate- Assist with meals as needed- Monitor I&O, weight, and lab values if indicated- Obtain nutrition services referral as needed  Outcome: Progressing     Problem: METABOLIC, FLUID AND ELECTROLYTES - ADULT  Goal: Electrolytes maintained within normal limits  Description: INTERVENTIONS:- Monitor labs and assess patient for signs and symptoms of electrolyte imbalances- Administer electrolyte replacement as ordered- Monitor response to electrolyte replacements, including repeat lab results as appropriate- Instruct patient on fluid and nutrition as appropriate  Outcome: Progressing  Goal: Fluid balance maintained  Description: INTERVENTIONS:- Monitor labs - Monitor I/O and WT- Instruct patient on fluid and nutrition as appropriate- Assess for signs & symptoms of volume excess or deficit  Outcome: Progressing

## 2025-04-11 NOTE — ASSESSMENT & PLAN NOTE
The patient receives IVIG infusions Q28 days in the OP setting with goal IGG >500.   He missed most recent infusions due to illness.   Now presents with acute infection.    IgG is 318 on 4/10  Will give 400mg/kg IVIG x1 here. Premeds ordered.

## 2025-04-11 NOTE — PROGRESS NOTES
Progress Note - Hospitalist   Name: Cayetano Lucero 69 y.o. male I MRN: [id number]  Unit/Bed#: E5 -01 I Date of Admission: 4/9/2025   Date of Service: 4/11/2025 I Hospital Day: 1    Assessment & Plan  Multifocal pneumonia  Presented with progressive cough, fevers/chills, decreased appetite  Was diagnosed with influenza approximately 1 month ago  Viral panel on admission negative  Met septic criteria on admission, CT chest with multifocal pneumonia  Fortunately without hypoxia  Continue IV Rocephin and doxycycline day 3  Urine antigens negative  Follow-up sputum culture  Sepsis without acute organ dysfunction (HCC)  Evident by tachycardia, tachypnea, leukocytosis, source pneumonia  Antibiotics as stated above  Blood cultures negative at 24 hours  Improving  CAD (coronary artery disease)  Remote history in 2004, has 1 stent. Continue aspirin, fenofibrate  Chronic obstructive pulmonary disease (HCC)  With outpatient diagnosis of moderate persistent asthma bronchitis versus COPD  PFTs in 2021 with normal spirometry no bronchodilator response  Continue PTA inhaler, started on DuoNebs  No noted wheezing on exam, continue PTA oral prednisone  Dyspnea on exertion  Suspect this could be multifactorial in setting of underlying COPD with multifocal pneumonia although patient notes this has been ongoing prior to influenza diagnosis  No exertional or resting chest pain  No clinical signs or symptoms of heart failure  Of note patient does have remote history of CAD with stent, last echocardiogram with EF 60% in 2018  Will update echocardiogram here  CLL (chronic lymphocytic leukemia) ([facility])  Known to Dr. Mejia, patient on ibrutinib and prednisone daily  Also receiving IVIG and Gazyva every 4 weeks  Hypogammaglobulinemia, acquired ([facility])  Receives IVIG as an outpatient  IgG levels here 318  Consult hematology to discuss IVIG infusion here  Hyperlipidemia  Continue fenofibrate  Hypertension  Continue amlodipine 10 mg daily,  losartan 100 mg daily  Blood pressure this AM sub optimally controlled, check after morning medications  Stage 3 chronic kidney disease, unspecified whether stage 3a or 3b CKD (HCC)  Lab Results   Component Value Date    EGFR 77 04/11/2025    EGFR 85 04/10/2025    EGFR 71 04/09/2025    CREATININE 0.98 04/11/2025    CREATININE 0.91 04/10/2025    CREATININE 1.05 04/09/2025   Creatinine at baseline  Continue to monitor  Gastroesophageal reflux disease without esophagitis  Patient did undergo paraesophageal hernia repair 6/7/2024  Tolerating diet without concerns, although does admit to a few episodes of intermittent dysphagia  He is known to Dr. 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)  Noted, patient is not on heparin products  Long term (current) use of systemic steroids  PTA prednisone 5 mg daily  Type 2 diabetes mellitus with diabetic polyneuropathy, with long-term current use of insulin (HCC)  Lab Results   Component Value Date    HGBA1C 6.6 (A) 01/29/2025     Recent Labs     04/10/25  1558 04/10/25  2135 04/11/25  0737 04/11/25  1057   POCGLU 190* 161* 90 79     PTA glargine 18 un at bedtime, 12 units with meals  Patient has CGM and feels more comfortable monitoring this himself, nonformulary order placed  Monitor for hypoglycemia  Pulmonary nodule  6 mm left upper lobe groundglass nodule noted on CT  Discussed with patient malignancy cannot be ruled out  Will need follow up non-contrast in 6-12 months to ensure stability, then additional follow up every 2 years until 5 year stability is demonstrated  Incidental finding note completed    VTE Pharmacologic Prophylaxis:   Low Risk (Score 0-2) - Encourage Ambulation.    Mobility:   Basic Mobility Inpatient Raw Score:  24  JH-HLM Goal: 8: Walk 250 feet or more  JH-HLM Achieved: 7: Walk 25 feet or more  JH-HLM Goal achieved. Continue to encourage appropriate mobility.    Patient Centered Rounds: I performed bedside rounds with nursing staff today.   Discussions with Specialists or Other Care Team Provider: hematology    Education and Discussions with Family / Patient: Updated  (significant other) via phone.    Current Length of Stay: 1 day(s)  Current Patient Status: Inpatient   Certification Statement: The patient will continue to require additional inpatient hospital stay due to antibiotics, IVIG, sputum culture, echo  Discharge Plan: Anticipate discharge tomorrow to home.    Code Status: Level 1 - Full Code    Subjective   Patient seen and examined.  Still has a productive cough but notes it is getting better.  He notes the nebulizers are helping him.  Tolerating a diet okay, had some pancakes today.  Denies any diarrhea or abdominal pain.  No current chest pain or shortness of breath at rest.  Does admit to dyspnea on exertion over the past couple months even prior to his influenza diagnosis.  Denies any chest pain with this.  Denies any lower leg swelling or orthopnea.     We discussed his sugar trend.  He is monitoring for low blood sugars.  Still feels comfortable managing his own.    Objective :  Temp:  [97.9 °F (36.6 °C)-98 °F (36.7 °C)] 98 °F (36.7 °C)  HR:  [] 71  BP: (111-168)/(80-92) 162/80  Resp:  [17-18] 18  SpO2:  [91 %-98 %] 98 %  O2 Device: None (Room air)    Body mass index is 27.57 kg/m².     Input and Output Summary (last 24 hours):     Intake/Output Summary (Last 24 hours) at 4/11/2025 1227  Last data filed at 4/11/2025 0820  Gross per 24 hour   Intake 240 ml   Output --   Net 240 ml       Physical Exam  Vitals and nursing note reviewed.   Constitutional:       Appearance: Normal appearance. He is obese. He is not ill-appearing or diaphoretic.   Cardiovascular:      Rate and Rhythm: Normal  rate and regular rhythm.   Pulmonary:      Effort: Pulmonary effort is normal. No respiratory distress.      Breath sounds: Rhonchi present. No wheezing or rales.      Comments: Rhonchorous coarse breath sounds bases  Abdominal:      General: Bowel sounds are normal.      Palpations: Abdomen is soft.      Tenderness: There is no abdominal tenderness.   Musculoskeletal:      Right lower leg: No edema.      Left lower leg: No edema.   Skin:     General: Skin is warm and dry.   Neurological:      Mental Status: He is alert and oriented to person, place, and time. Mental status is at baseline.   Psychiatric:         Mood and Affect: Mood normal.         Behavior: Behavior normal.         Lab Results: I have reviewed the following results:   Results from last 7 days   Lab Units 04/11/25  0614 04/10/25  0545   WBC Thousand/uL 4.10* 5.97   HEMOGLOBIN g/dL 12.1 13.6   HEMATOCRIT % 37.2 40.8   PLATELETS Thousands/uL 243 259   BANDS PCT % 1  --    SEGS PCT %  --  75   LYMPHO PCT % 25 5*   MONO PCT % 20* 8   EOS PCT % 1 0     Results from last 7 days   Lab Units 04/11/25  0614 04/10/25  0435   SODIUM mmol/L 141 137   POTASSIUM mmol/L 3.4* 4.2   CHLORIDE mmol/L 109* 104   CO2 mmol/L 26 24   BUN mg/dL 22 23   CREATININE mg/dL 0.98 0.91   ANION GAP mmol/L 6 9   CALCIUM mg/dL 8.9 9.0   ALBUMIN g/dL  --  3.8   TOTAL BILIRUBIN mg/dL  --  0.42   ALK PHOS U/L  --  34   ALT U/L  --  13   AST U/L  --  20   GLUCOSE RANDOM mg/dL 88 193*     Results from last 7 days   Lab Units 04/09/25  1536   INR  1.34*     Results from last 7 days   Lab Units 04/11/25  1057 04/11/25  0737 04/10/25  2135 04/10/25  1558 04/10/25  1120 04/10/25  0824 04/10/25  0705 04/09/25  2122 04/09/25  1850   POC GLUCOSE mg/dl 79 90 161* 190* 161* 204* 180* 246* 152*     Results from last 7 days   Lab Units 04/10/25  0434 04/09/25  1931 04/09/25  1536   LACTIC ACID mmol/L  --   --  1.4   PROCALCITONIN ng/ml 0.15 0.18  --      Recent Cultures (last 7 days):   Results  from last 7 days   Lab Units 04/10/25  1018 04/10/25  1016 04/09/25  1536   BLOOD CULTURE   --   --  No Growth at 24 hrs.  No Growth at 24 hrs.   SPUTUM CULTURE  Culture too young- will reincubate  --   --    GRAM STAIN RESULT  2+ Epithelial cells per low power field*  2+ Gram positive cocci in pairs*  2+ Gram negative coccobacilli*  2+ Polys*  --   --    LEGIONELLA URINARY ANTIGEN   --  Negative  --      Last 24 Hours Medication List:     Current Facility-Administered Medications:     acetaminophen (TYLENOL) tablet 650 mg, Q6H PRN    acyclovir (ZOVIRAX) tablet 400 mg, BID    albuterol (PROVENTIL HFA,VENTOLIN HFA) inhaler 2 puff, Q4H PRN    amLODIPine (NORVASC) tablet 10 mg, Daily    aspirin (ECOTRIN LOW STRENGTH) EC tablet 81 mg, Daily    benzonatate (TESSALON PERLES) capsule 100 mg, Q8H    cefTRIAXone (ROCEPHIN) 1,000 mg in dextrose 5 % 50 mL IVPB, Q24H, Last Rate: 1,000 mg (04/10/25 1721)    citalopram (CeleXA) tablet 40 mg, Daily    doxycycline hyclate (VIBRAMYCIN) capsule 100 mg, Q12H OTONIEL    fenofibrate (TRICOR) tablet 145 mg, Daily    fluticasone-vilanterol 200-25 mcg/actuation 1 puff, Daily    folic acid (FOLVITE) tablet 800 mcg, Daily    gabapentin (NEURONTIN) capsule 600 mg, Daily    Ibrutinib TABS 140 mg, Daily    insulin degludec (TRESIBA) 100 units/mL injection pen 18 Units, HS    insulin glargine (LANTUS) subcutaneous injection 18 Units 0.18 mL, HS    insulin lispro (HumaLOG) 100 units/mL subcutaneous injection pen (1-unit dial) 12 Units, TID AC    ipratropium-albuterol (DUO-NEB) 0.5-2.5 mg/3 mL inhalation solution 3 mL, Q6H    LORazepam (ATIVAN) tablet 0.5 mg, Daily PRN    losartan (COZAAR) tablet 100 mg, Daily    multivitamin stress formula tablet 1 tablet, Daily    ondansetron (ZOFRAN) injection 4 mg, Q6H PRN    oxyCODONE (ROXICODONE) immediate release tablet 10 mg, Q6H PRN    pantoprazole (PROTONIX) EC tablet 40 mg, Daily Before Breakfast    predniSONE tablet 5 mg, Daily    Administrative  Statements   Today, Patient Was Seen By: Christina Johnson PA-C  **Please Note: This note may have been constructed using a voice recognition system.**

## 2025-04-11 NOTE — ASSESSMENT & PLAN NOTE
Evident by tachycardia, tachypnea, leukocytosis, source pneumonia  Antibiotics as stated above  Blood cultures negative at 24 hours  Improving

## 2025-04-11 NOTE — ASSESSMENT & PLAN NOTE
6 mm left upper lobe groundglass nodule noted on CT  Discussed with patient malignancy cannot be ruled out  Will need follow up non-contrast in 6-12 months to ensure stability, then additional follow up every 2 years until 5 year stability is demonstrated  Incidental finding note completed

## 2025-04-11 NOTE — PLAN OF CARE
Problem: SAFETY ADULT  Goal: Patient will remain free of falls  Description: INTERVENTIONS:- Educate patient/family on patient safety including physical limitations- Instruct patient to call for assistance with activity - Consult OT/PT to assist with strengthening/mobility - Keep Call bell within reach- Keep bed low and locked with side rails adjusted as appropriate- Keep care items and personal belongings within reach- Initiate and maintain comfort rounds- Make Fall Risk Sign visible to staff INTERVENTIONS:- Educate patient/family on patient safety including physical limitations- Instruct patient to call for assistance with activity - Consult OT/PT to assist with strengthening/mobility - Keep Call bell within reach- Keep bed low and locked with side rails adjusted as appropriate- Keep care items and personal belongings within reach- Initiate and maintain comfort rounds- Make Fall Risk Sign visible to staff Consider moving patient to room near nurses station  Outcome: Progressing

## 2025-04-11 NOTE — ASSESSMENT & PLAN NOTE
Presented with progressive cough, fevers/chills, decreased appetite  Was diagnosed with influenza approximately 1 month ago  Viral panel on admission negative  Met septic criteria on admission, CT chest with multifocal pneumonia  Fortunately without hypoxia  Continue IV Rocephin and doxycycline day 3  Urine antigens negative  Follow-up sputum culture

## 2025-04-11 NOTE — ASSESSMENT & PLAN NOTE
Lab Results   Component Value Date    HGBA1C 6.6 (A) 01/29/2025     Recent Labs     04/10/25  1558 04/10/25  2135 04/11/25  0737 04/11/25  1057   POCGLU 190* 161* 90 79     PTA glargine 18 un at bedtime, 12 units with meals  Patient has CGM and feels more comfortable monitoring this himself, nonformulary order placed  Monitor for hypoglycemia

## 2025-04-11 NOTE — INCIDENTAL FINDINGS
The following findings require follow up:  Radiographic finding   Findin mm left upper lobe groundglass nodule noted on CT    Follow up required: CT chest 6 months      Please notify the following clinician to assist with the follow up:   PCP and/or pulmonology.    Incidental finding results were discussed with the Patient by Christina Johnson PA-C on 25.   They expressed understanding and all questions answered.

## 2025-04-11 NOTE — ASSESSMENT & PLAN NOTE
Patient did undergo paraesophageal hernia repair 6/7/2024  Tolerating diet without concerns, although does admit to a few episodes of intermittent dysphagia  He is known to Dr. Gonsales, reviewed office visits and post op follow ups  Of note his CT chest does display persistent distal esophageal thickening which I discussed with him  Continue PPI  Continue OP follow up

## 2025-04-12 LAB
ANION GAP SERPL CALCULATED.3IONS-SCNC: 5 MMOL/L (ref 4–13)
AORTIC ROOT: 3.5 CM
BASOPHILS # BLD MANUAL: 0 THOUSAND/UL (ref 0–0.1)
BASOPHILS NFR MAR MANUAL: 0 % (ref 0–1)
BSA FOR ECHO PROCEDURE: 2.19 M2
BUN SERPL-MCNC: 18 MG/DL (ref 5–25)
CALCIUM SERPL-MCNC: 9.4 MG/DL (ref 8.4–10.2)
CHLORIDE SERPL-SCNC: 109 MMOL/L (ref 96–108)
CO2 SERPL-SCNC: 27 MMOL/L (ref 21–32)
CREAT SERPL-MCNC: 0.95 MG/DL (ref 0.6–1.3)
DOP CALC LVOT AREA: 3.14 CM2
DOP CALC LVOT DIAMETER: 2 CM
E WAVE DECELERATION TIME: 183 MS
E/A RATIO: 0.66
EOSINOPHIL # BLD MANUAL: 0 THOUSAND/UL (ref 0–0.4)
EOSINOPHIL NFR BLD MANUAL: 0 % (ref 0–6)
ERYTHROCYTE [DISTWIDTH] IN BLOOD BY AUTOMATED COUNT: 13.5 % (ref 11.6–15.1)
FRACTIONAL SHORTENING: 33 (ref 28–44)
GFR SERPL CREATININE-BSD FRML MDRD: 80 ML/MIN/1.73SQ M
GIANT PLATELETS BLD QL SMEAR: PRESENT
GLUCOSE SERPL-MCNC: 108 MG/DL (ref 65–140)
GLUCOSE SERPL-MCNC: 132 MG/DL (ref 65–140)
GLUCOSE SERPL-MCNC: 182 MG/DL (ref 65–140)
GLUCOSE SERPL-MCNC: 96 MG/DL (ref 65–140)
GLUCOSE SERPL-MCNC: 97 MG/DL (ref 65–140)
HCT VFR BLD AUTO: 38.1 % (ref 36.5–49.3)
HGB BLD-MCNC: 12.7 G/DL (ref 12–17)
INTERVENTRICULAR SEPTUM IN DIASTOLE (PARASTERNAL SHORT AXIS VIEW): 1.7 CM
INTERVENTRICULAR SEPTUM: 1.7 CM (ref 0.6–1.1)
LAAS-AP2: 24.1 CM2
LAAS-AP4: 15.6 CM2
LEFT ATRIUM AREA SYSTOLE SINGLE PLANE A4C: 16 CM2
LEFT ATRIUM SIZE: 4.1 CM
LEFT ATRIUM VOLUME (MOD BIPLANE): 67 ML
LEFT ATRIUM VOLUME INDEX (MOD BIPLANE): 30.6 ML/M2
LEFT INTERNAL DIMENSION IN SYSTOLE: 3.3 CM (ref 2.1–4)
LEFT VENTRICLE DIASTOLIC VOLUME (MOD BIPLANE): 115 ML
LEFT VENTRICLE DIASTOLIC VOLUME INDEX (MOD BIPLANE): 52.5 ML/M2
LEFT VENTRICLE SYSTOLIC VOLUME (MOD BIPLANE): 56 ML
LEFT VENTRICLE SYSTOLIC VOLUME INDEX (MOD BIPLANE): 25.6 ML/M2
LEFT VENTRICULAR INTERNAL DIMENSION IN DIASTOLE: 4.9 CM (ref 3.5–6)
LEFT VENTRICULAR POSTERIOR WALL IN END DIASTOLE: 1.7 CM
LEFT VENTRICULAR STROKE VOLUME: 70 ML
LG PLATELETS BLD QL SMEAR: PRESENT
LV EF BIPLANE MOD: 52 %
LV EF US.2D.A4C+ESTIMATED: 51 %
LVSV (TEICH): 70 ML
LYMPHOCYTES # BLD AUTO: 0.82 THOUSAND/UL (ref 0.6–4.47)
LYMPHOCYTES # BLD AUTO: 15 % (ref 14–44)
MAGNESIUM SERPL-MCNC: 1.9 MG/DL (ref 1.9–2.7)
MCH RBC QN AUTO: 30.2 PG (ref 26.8–34.3)
MCHC RBC AUTO-ENTMCNC: 33.3 G/DL (ref 31.4–37.4)
MCV RBC AUTO: 91 FL (ref 82–98)
MONOCYTES # BLD AUTO: 0.63 THOUSAND/UL (ref 0–1.22)
MONOCYTES NFR BLD: 14 % (ref 4–12)
MV PEAK A VEL: 1.26 M/S
MV PEAK E VEL: 83 CM/S
MV STENOSIS PRESSURE HALF TIME: 53 MS
MV VALVE AREA P 1/2 METHOD: 4.15
MYELOCYTE ABSOLUTE CT: 0.05 THOUSAND/UL (ref 0–0.1)
MYELOCYTES NFR BLD MANUAL: 1 % (ref 0–1)
NEUTROPHILS # BLD MANUAL: 3.04 THOUSAND/UL (ref 1.85–7.62)
NEUTS BAND NFR BLD MANUAL: 5 % (ref 0–8)
NEUTS SEG NFR BLD AUTO: 62 % (ref 43–75)
OVALOCYTES BLD QL SMEAR: PRESENT
PLATELET # BLD AUTO: 263 THOUSANDS/UL (ref 149–390)
PLATELET BLD QL SMEAR: ADEQUATE
PMV BLD AUTO: 10.4 FL (ref 8.9–12.7)
POLYCHROMASIA BLD QL SMEAR: PRESENT
POTASSIUM SERPL-SCNC: 4.6 MMOL/L (ref 3.5–5.3)
RBC # BLD AUTO: 4.2 MILLION/UL (ref 3.88–5.62)
RBC MORPH BLD: PRESENT
RIGHT ATRIUM AREA SYSTOLE A4C: 15.5 CM2
RIGHT VENTRICLE ID DIMENSION: 4.2 CM
SL CV LEFT ATRIUM LENGTH A2C: 5.9 CM
SL CV PED ECHO LEFT VENTRICLE DIASTOLIC VOLUME (MOD BIPLANE) 2D: 114 ML
SL CV PED ECHO LEFT VENTRICLE SYSTOLIC VOLUME (MOD BIPLANE) 2D: 44 ML
SODIUM SERPL-SCNC: 141 MMOL/L (ref 135–147)
VARIANT LYMPHS # BLD AUTO: 3 %
WBC # BLD AUTO: 4.53 THOUSAND/UL (ref 4.31–10.16)

## 2025-04-12 PROCEDURE — 82948 REAGENT STRIP/BLOOD GLUCOSE: CPT

## 2025-04-12 PROCEDURE — 85007 BL SMEAR W/DIFF WBC COUNT: CPT

## 2025-04-12 PROCEDURE — 94760 N-INVAS EAR/PLS OXIMETRY 1: CPT

## 2025-04-12 PROCEDURE — 85027 COMPLETE CBC AUTOMATED: CPT

## 2025-04-12 PROCEDURE — 99232 SBSQ HOSP IP/OBS MODERATE 35: CPT

## 2025-04-12 PROCEDURE — 94640 AIRWAY INHALATION TREATMENT: CPT

## 2025-04-12 PROCEDURE — 83735 ASSAY OF MAGNESIUM: CPT

## 2025-04-12 PROCEDURE — 80048 BASIC METABOLIC PNL TOTAL CA: CPT

## 2025-04-12 RX ORDER — BENZONATATE 100 MG/1
200 CAPSULE ORAL EVERY 8 HOURS
Status: DISCONTINUED | OUTPATIENT
Start: 2025-04-12 | End: 2025-04-13 | Stop reason: HOSPADM

## 2025-04-12 RX ADMIN — ACYCLOVIR 400 MG: 800 TABLET ORAL at 17:36

## 2025-04-12 RX ADMIN — BENZONATATE 200 MG: 100 CAPSULE ORAL at 13:13

## 2025-04-12 RX ADMIN — LOSARTAN POTASSIUM 100 MG: 50 TABLET, FILM COATED ORAL at 08:07

## 2025-04-12 RX ADMIN — IPRATROPIUM BROMIDE AND ALBUTEROL SULFATE 3 ML: .5; 3 SOLUTION RESPIRATORY (INHALATION) at 19:53

## 2025-04-12 RX ADMIN — PREDNISONE 5 MG: 5 TABLET ORAL at 08:07

## 2025-04-12 RX ADMIN — INSULIN LISPRO 12 UNITS: 100 INJECTION, SOLUTION INTRAVENOUS; SUBCUTANEOUS at 17:38

## 2025-04-12 RX ADMIN — ACYCLOVIR 400 MG: 800 TABLET ORAL at 08:58

## 2025-04-12 RX ADMIN — BENZONATATE 100 MG: 100 CAPSULE ORAL at 05:40

## 2025-04-12 RX ADMIN — FOLIC ACID TAB 400 MCG 800 MCG: 400 TAB at 08:07

## 2025-04-12 RX ADMIN — GABAPENTIN 600 MG: 300 CAPSULE ORAL at 08:07

## 2025-04-12 RX ADMIN — DOXYCYCLINE HYCLATE 100 MG: 100 CAPSULE ORAL at 08:06

## 2025-04-12 RX ADMIN — IBRUTINIB 140 MG: 140 TABLET, FILM COATED ORAL at 08:06

## 2025-04-12 RX ADMIN — BENZONATATE 200 MG: 100 CAPSULE ORAL at 21:02

## 2025-04-12 RX ADMIN — DEXTROSE 1000 MG: 50 INJECTION, SOLUTION INTRAVENOUS at 15:45

## 2025-04-12 RX ADMIN — FENOFIBRATE 145 MG: 145 TABLET ORAL at 08:09

## 2025-04-12 RX ADMIN — DOXYCYCLINE HYCLATE 100 MG: 100 CAPSULE ORAL at 21:02

## 2025-04-12 RX ADMIN — IPRATROPIUM BROMIDE AND ALBUTEROL SULFATE 3 ML: .5; 3 SOLUTION RESPIRATORY (INHALATION) at 07:38

## 2025-04-12 RX ADMIN — B-COMPLEX W/ C & FOLIC ACID TAB 1 TABLET: TAB at 08:07

## 2025-04-12 RX ADMIN — FLUTICASONE FUROATE AND VILANTEROL TRIFENATATE 1 PUFF: 200; 25 POWDER RESPIRATORY (INHALATION) at 08:07

## 2025-04-12 RX ADMIN — AMLODIPINE BESYLATE 10 MG: 10 TABLET ORAL at 08:06

## 2025-04-12 RX ADMIN — PANTOPRAZOLE SODIUM 40 MG: 40 TABLET, DELAYED RELEASE ORAL at 07:16

## 2025-04-12 RX ADMIN — ASPIRIN 81 MG: 81 TABLET, COATED ORAL at 08:07

## 2025-04-12 RX ADMIN — IPRATROPIUM BROMIDE AND ALBUTEROL SULFATE 3 ML: .5; 3 SOLUTION RESPIRATORY (INHALATION) at 13:47

## 2025-04-12 RX ADMIN — CITALOPRAM HYDROBROMIDE 40 MG: 20 TABLET ORAL at 08:07

## 2025-04-12 NOTE — ASSESSMENT & PLAN NOTE
Suspect this could be multifactorial in setting of underlying COPD with multifocal pneumonia although patient notes this has been ongoing for months prior to influenza diagnosis  No exertional or resting chest pain  No SOB at rest  No clinical signs or symptoms of heart failure  Of note patient does have remote history of CAD with stent, last echocardiogram with EF 60% in 2018  Follow-up update echocardiogram here

## 2025-04-12 NOTE — ASSESSMENT & PLAN NOTE
6 mm left upper lobe groundglass nodule noted on CT  Discussed with patient malignancy cannot be ruled out, he expressed understanding  Will need follow up non-contrast in 6-12 months to ensure stability, then additional follow up every 2 years until 5 year stability is demonstrated  Incidental finding note completed

## 2025-04-12 NOTE — CASE MANAGEMENT
Case Management Discharge Planning Note    Patient name Cayetano Lucero  Location [facility] /E5 -* MRN [id number]  : 1955 Date 2025       Current Admission Date: 2025  Current Admission Diagnosis:Multifocal pneumonia   Patient Active Problem List    Diagnosis Date Noted Date Diagnosed    Dyspnea on exertion 2025     Pulmonary nodule 04/10/2025     Sepsis without acute organ dysfunction (HCC) 2025     Left lower lobe pulmonary infiltrate 2025     S/P repair of paraesophageal hernia 2024     Low serum IgG for age 04/10/2024     Drug-induced osteoporosis 04/10/2024     Port-A-Cath in place 2024     Type 2 diabetes mellitus with diabetic polyneuropathy, with long-term current use of insulin ([facility]) 2024     Preop cardiovascular exam 2023     Cancer related pain 2023     Long term (current) use of systemic steroids 2022     Multifocal pneumonia 2022     Depression, recurrent ([facility]) 03/15/2021     Chronic obstructive pulmonary disease (HCC) 2020     Autoimmune hemolytic anemia ([facility]) 2019     Right upper quadrant abdominal pain 2019     Hypogammaglobulinemia, acquired ([facility]) 04/10/2019     Acute bursitis of right shoulder 2018     Esophageal dysphagia 10/11/2018     Esophageal stricture 2018     Dysphagia 2018     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     Chronic right shoulder pain 2018     Type 2 diabetes mellitus with hyperglycemia, with long-term current use of insulin ([facility]) 2018     Listeria bacteremia 2018     Colitis, acute 2018     Gastroesophageal reflux disease without esophagitis 2018     Headache around the eyes 10/30/2017     Pancytopenia (HCC) 10/28/2017     Cellulitis of head or scalp 10/08/2017     Leukopenia due to antineoplastic chemotherapy ([facility]) 2017     Hyperglycemia 2017     Ulcer of esophagus without bleeding 2017     Stage 3  chronic kidney disease, unspecified whether stage 3a or 3b CKD (HCC) 08/21/2017     Anemia, chronic disease 03/10/2017     Hemolytic anemia (HCC) 03/06/2017     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[facility]) 03/06/2017     H/O blood clots      Candida esophagitis (HCC) 01/18/2017     CLL (chronic lymphocytic leukemia) ([facility])      Hyperlipidemia      Hypertension      History of TIA (transient ischemic attack)      Esophagitis, reflux 04/16/2014     Thrombocytopenia (HCC) 05/01/2013     Chronic lymphocytic leukemia (HCC) 04/18/2013     CAD (coronary artery disease) 01/01/2004       LOS (days): 2  Geometric Mean LOS (GMLOS) (days): 4.9  Days to GMLOS:2.8     OBJECTIVE:  Risk of Unplanned Readmission Score: 18.65         Current admission status: Inpatient   Preferred Pharmacy:   [id number] - [place] - [address]  [address] [place] [address]  Phone: [phone] Fax: [phone]    [facility] - [address] [address]  Phone: [phone] Fax: [phone]    [facility] - [place], [place] - [address] [place] [address] [address].  [address] [address]  [place] [address]  Phone: [phone] Fax: [phone]    [facility] - [place], [place] - [id number]  [address]  [id number]  [address]  [place] [id number]  Phone: [phone] Fax: [phone]    [facility] - [place], [place] [facility] [address]  [facility] [address]  [place] [address]  Phone: [phone] Fax: [phone]    Primary Care Provider: Meseret Elliott DO    Primary Insurance: MEDICARE  Secondary Insurance: BLUE CROSS    DISCHARGE DETAILS:         Additional Comments: CM made aware by provider that pt is a possible d/c tomorrow with no identified needs at this time.

## 2025-04-12 NOTE — ASSESSMENT & PLAN NOTE
Presented with progressive cough, fevers/chills, decreased appetite  Was diagnosed with influenza approximately 1 month ago  Viral panel on admission negative  Met septic criteria on admission, CT chest with multifocal pneumonia  Fortunately without hypoxia  Continue IV Rocephin day 4, doxycycline day 3  Urine antigens negative  Sputum culture pending, follow-up  Continue anti tussives - will increase dosing  Ambulated the hallways yesterday, will check pulse oximetry while ambulating today.  Discussed with patient and message restorative technician

## 2025-04-12 NOTE — PROGRESS NOTES
Progress Note - Hospitalist   Name: Cayetano Lucero 69 y.o. male I MRN: [id number]  Unit/Bed#: E5 -01 I Date of Admission: 4/9/2025   Date of Service: 4/12/2025 I Hospital Day: 2    Assessment & Plan  Multifocal pneumonia  Presented with progressive cough, fevers/chills, decreased appetite  Was diagnosed with influenza approximately 1 month ago  Viral panel on admission negative  Met septic criteria on admission, CT chest with multifocal pneumonia  Fortunately without hypoxia  Continue IV Rocephin day 4, doxycycline day 3  Urine antigens negative  Sputum culture pending, follow-up  Continue anti tussives - will increase dosing  Ambulated the hallways yesterday, will check pulse oximetry while ambulating today.  Discussed with patient and message restorative technician  Sepsis without acute organ dysfunction (HCC)  Evident by tachycardia, tachypnea, leukocytosis, source pneumonia  Antibiotics as stated above  Blood cultures negative at 48 hours  Improving  CAD (coronary artery disease)  Remote history in 2004, has 1 stent. Continue aspirin, fenofibrate  Chronic obstructive pulmonary disease (HCC)  With outpatient diagnosis of moderate persistent asthma bronchitis versus COPD  PFTs in 2021 with normal spirometry no bronchodilator response  Continue PTA inhaler, started on DuoNebs -continue while inpatient  No noted wheezing on exam, continue PTA oral prednisone  Dyspnea on exertion  Suspect this could be multifactorial in setting of underlying COPD with multifocal pneumonia although patient notes this has been ongoing for months prior to influenza diagnosis  No exertional or resting chest pain  No SOB at rest  No clinical signs or symptoms of heart failure  Of note patient does have remote history of CAD with stent, last echocardiogram with EF 60% in 2018  Follow-up update echocardiogram here  CLL (chronic lymphocytic leukemia) (HCC)  Known to Dr. Mejia, patient on ibrutinib and prednisone daily  Also receiving  IVIG and Gazyva every 4 weeks  Hematology consulted here, messaged patient's outpatient oncology team   Hypogammaglobulinemia, acquired (HCC)  Receives IVIG as an outpatient  IgG levels here 318  Status post IVIG infusion 4/11/2025  Hyperlipidemia  Continue fenofibrate  Hypertension  Continue amlodipine 10 mg daily, losartan 100 mg daily  Stage 3 chronic kidney disease, unspecified whether stage 3a or 3b CKD (HCC)  Lab Results   Component Value Date    EGFR 80 04/12/2025    EGFR 77 04/11/2025    EGFR 85 04/10/2025    CREATININE 0.95 04/12/2025    CREATININE 0.98 04/11/2025    CREATININE 0.91 04/10/2025   Creatinine at baseline  Continue to monitor  Gastroesophageal reflux disease without esophagitis  Patient did undergo paraesophageal hernia repair 6/7/2024  Tolerating diet without concerns, although does admit to a few episodes of intermittent dysphagia  He is known to Dr. Gonsales, reviewed office visits and post op follow ups  Of note his CT chest does display persistent distal esophageal thickening which I discussed with him  Continue PPI  Recommend discussing with Dr. 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)  Noted, patient is not on heparin products  Long term (current) use of systemic steroids  PTA prednisone 5 mg daily  Type 2 diabetes mellitus with diabetic polyneuropathy, with long-term current use of insulin (HCC)  Lab Results   Component Value Date    HGBA1C 6.6 (A) 01/29/2025     Recent Labs     04/11/25  1523 04/11/25  2021 04/12/25  0733 04/12/25  1115   POCGLU 197* 130 97 132     PTA glargine 18 un at bedtime, 12 units with meals  Patient has CGM and feels more comfortable monitoring this himself, nonformulary order placed  Monitor for hypoglycemia  Pulmonary nodule  6 mm left upper lobe groundglass nodule noted on CT  Discussed with patient malignancy cannot be ruled out, he expressed  understanding  Will need follow up non-contrast in 6-12 months to ensure stability, then additional follow up every 2 years until 5 year stability is demonstrated  Incidental finding note completed    VTE Pharmacologic Prophylaxis:   Low Risk (Score 0-2) - Encourage Ambulation.    Mobility:   Basic Mobility Inpatient Raw Score: 24  JH-HLM Goal: 8: Walk 250 feet or more  JH-HLM Achieved: 7: Walk 25 feet or more  JH-HLM Goal achieved. Continue to encourage appropriate mobility.    Patient Centered Rounds: I performed bedside rounds with nursing staff today.   Discussions with Specialists or Other Care Team Provider: restorative tech    Education and Discussions with Family / Patient: Patient, he has been in contact with wife. Updated wife Danii via telephone.     Current Length of Stay: 2 day(s)  Current Patient Status: Inpatient   Certification Statement: The patient will continue to require additional inpatient hospital stay due to pna treatment  Discharge Plan: Anticipate discharge in 24-48 hrs to home.    Code Status: Level 1 - Full Code    Subjective   Patient seen and examined.  Reports he ambulated the halls yesterday and felt well but got winded towards the end.  He still is coughing up mucus.  Denies any fevers, chills, chest pain or current shortness of breath at rest.  He had his IVIG infusion yesterday and it went well.  No further diarrhea.  We discussed his pulmonary nodule again and pending echocardiogram. Would be open to checking oxygen saturation while walking in the halls today.    Objective :  Temp:  [97.9 °F (36.6 °C)-98.1 °F (36.7 °C)] 97.9 °F (36.6 °C)  HR:  [80-91] 91  BP: (128-154)/(63-81) 154/81  Resp:  [14-20] 20  SpO2:  [90 %-96 %] 96 %  O2 Device: None (Room air)    Body mass index is 27.44 kg/m².     Input and Output Summary (last 24 hours):     Intake/Output Summary (Last 24 hours) at 4/12/2025 1128  Last data filed at 4/12/2025 0822  Gross per 24 hour   Intake 540 ml   Output --    Net 540 ml       Physical Exam  Vitals and nursing note reviewed.   Constitutional:       Appearance: He is obese. He is not ill-appearing or diaphoretic.   Cardiovascular:      Rate and Rhythm: Normal rate and regular rhythm.   Pulmonary:      Effort: Pulmonary effort is normal. No respiratory distress.      Breath sounds: Rhonchi (bilateral bases, upper apices clear with coughing. on room air) present. No wheezing or rales.   Abdominal:      General: Bowel sounds are normal.      Palpations: Abdomen is soft.      Tenderness: There is no abdominal tenderness.   Musculoskeletal:      Right lower leg: No edema.      Left lower leg: No edema.   Skin:     General: Skin is warm and dry.   Neurological:      Mental Status: He is alert and oriented to person, place, and time. Mental status is at baseline.   Psychiatric:         Mood and Affect: Mood normal.         Behavior: Behavior normal.         Lab Results: I have reviewed the following results:   Results from last 7 days   Lab Units 04/12/25  0622 04/11/25  0614 04/10/25  0545   WBC Thousand/uL 4.53 4.10* 5.97   HEMOGLOBIN g/dL 12.7 12.1 13.6   HEMATOCRIT % 38.1 37.2 40.8   PLATELETS Thousands/uL 263 243 259   BANDS PCT %  --  1  --    SEGS PCT %  --   --  75   LYMPHO PCT %  --  25 5*   MONO PCT %  --  20* 8   EOS PCT %  --  1 0     Results from last 7 days   Lab Units 04/12/25  0622 04/11/25  0614 04/10/25  0435   SODIUM mmol/L 141   < > 137   POTASSIUM mmol/L 4.6   < > 4.2   CHLORIDE mmol/L 109*   < > 104   CO2 mmol/L 27   < > 24   BUN mg/dL 18   < > 23   CREATININE mg/dL 0.95   < > 0.91   ANION GAP mmol/L 5   < > 9   CALCIUM mg/dL 9.4   < > 9.0   ALBUMIN g/dL  --   --  3.8   TOTAL BILIRUBIN mg/dL  --   --  0.42   ALK PHOS U/L  --   --  34   ALT U/L  --   --  13   AST U/L  --   --  20   GLUCOSE RANDOM mg/dL 96   < > 193*    < > = values in this interval not displayed.     Results from last 7 days   Lab Units 04/09/25  1536   INR  1.34*     Results from last 7  days   Lab Units 04/12/25  1115 04/12/25  0733 04/11/25  2021 04/11/25  1523 04/11/25  1057 04/11/25  0737 04/10/25  2135 04/10/25  1558 04/10/25  1120 04/10/25  0824 04/10/25  0705 04/09/25  2122   POC GLUCOSE mg/dl 132 97 130 197* 79 90 161* 190* 161* 204* 180* 246*     Results from last 7 days   Lab Units 04/10/25  0434 04/09/25  1931 04/09/25  1536   LACTIC ACID mmol/L  --   --  1.4   PROCALCITONIN ng/ml 0.15 0.18  --      Recent Cultures (last 7 days):   Results from last 7 days   Lab Units 04/10/25  1018 04/10/25  1016 04/09/25  1536   BLOOD CULTURE   --   --  No Growth at 48 hrs.  No Growth at 48 hrs.   SPUTUM CULTURE  Culture too young- will reincubate  --   --    GRAM STAIN RESULT  2+ Epithelial cells per low power field*  2+ Gram positive cocci in pairs*  2+ Gram negative coccobacilli*  2+ Polys*  --   --    LEGIONELLA URINARY ANTIGEN   --  Negative  --      Last 24 Hours Medication List:     Current Facility-Administered Medications:     acetaminophen (TYLENOL) tablet 650 mg, Q6H PRN    acyclovir (ZOVIRAX) tablet 400 mg, BID    albuterol (PROVENTIL HFA,VENTOLIN HFA) inhaler 2 puff, Q4H PRN    amLODIPine (NORVASC) tablet 10 mg, Daily    aspirin (ECOTRIN LOW STRENGTH) EC tablet 81 mg, Daily    benzonatate (TESSALON PERLES) capsule 100 mg, Q8H    cefTRIAXone (ROCEPHIN) 1,000 mg in dextrose 5 % 50 mL IVPB, Q24H, Last Rate: 1,000 mg (04/11/25 1724)    citalopram (CeleXA) tablet 40 mg, Daily    doxycycline hyclate (VIBRAMYCIN) capsule 100 mg, Q12H OTONIEL    fenofibrate (TRICOR) tablet 145 mg, Daily    fluticasone-vilanterol 200-25 mcg/actuation 1 puff, Daily    folic acid (FOLVITE) tablet 800 mcg, Daily    gabapentin (NEURONTIN) capsule 600 mg, Daily    Ibrutinib TABS 140 mg, Daily    insulin degludec (TRESIBA) 100 units/mL injection pen 18 Units, HS    insulin glargine (LANTUS) subcutaneous injection 18 Units 0.18 mL, HS    insulin lispro (HumaLOG) 100 units/mL subcutaneous injection pen (1-unit dial) 12  Units, TID AC    ipratropium-albuterol (DUO-NEB) 0.5-2.5 mg/3 mL inhalation solution 3 mL, TID    levalbuterol (XOPENEX) inhalation solution 1.25 mg, Q6H PRN    LORazepam (ATIVAN) tablet 0.5 mg, Daily PRN    losartan (COZAAR) tablet 100 mg, Daily    multivitamin stress formula tablet 1 tablet, Daily    ondansetron (ZOFRAN) injection 4 mg, Q6H PRN    oxyCODONE (ROXICODONE) immediate release tablet 10 mg, Q6H PRN    pantoprazole (PROTONIX) EC tablet 40 mg, Daily Before Breakfast    predniSONE tablet 5 mg, Daily    Administrative Statements   Today, Patient Was Seen By: Christina Johnson PA-C    **Please Note: This note may have been constructed using a voice recognition system.**

## 2025-04-12 NOTE — ASSESSMENT & PLAN NOTE
Patient did undergo paraesophageal hernia repair 6/7/2024  Tolerating diet without concerns, although does admit to a few episodes of intermittent dysphagia  He is known to Dr. Gonsales, reviewed office visits and post op follow ups  Of note his CT chest does display persistent distal esophageal thickening which I discussed with him  Continue PPI  Recommend discussing with Dr. Gonsales as an outpatient, patient agreeable

## 2025-04-12 NOTE — ASSESSMENT & PLAN NOTE
Known to Dr. Mejia, patient on ibrutinib and prednisone daily  Also receiving IVIG and Gazyva every 4 weeks  Hematology consulted here, messaged patient's outpatient oncology team

## 2025-04-12 NOTE — PLAN OF CARE
Problem: Potential for Falls  Goal: Patient will remain free of falls  Description: INTERVENTIONS:- Educate patient/family on patient safety including physical limitations- Instruct patient to call for assistance with activity - Consult OT/PT to assist with strengthening/mobility - Keep Call bell within reach- Keep bed low and locked with side rails adjusted as appropriate- Keep care items and personal belongings within reach- Initiate and maintain comfort rounds- Make Fall Risk Sign visible to staff INTERVENTIONS:- Educate patient/family on patient safety including physical limitations- Instruct patient to call for assistance with activity - Consult OT/PT to assist with strengthening/mobility - Keep Call bell within reach- Keep bed low and locked with side rails adjusted as appropriate- Keep care items and personal belongings within reach- Initiate and maintain comfort rounds- Make Fall Risk Sign visible to staff Consider moving patient to room near nurses station  Outcome: Progressing     Problem: PAIN - ADULT  Goal: Verbalizes/displays adequate comfort level or baseline comfort level  Description: Interventions:- Encourage patient to monitor pain and request assistance- Assess pain using appropriate pain scale- Administer analgesics based on type and severity of pain and evaluate response- Implement non-pharmacological measures as appropriate and evaluate response- Consider cultural and social influences on pain and pain management- Notify physician/advanced practitioner if interventions unsuccessful or patient reports new pain  Outcome: Progressing     Problem: INFECTION - ADULT  Goal: Absence or prevention of progression during hospitalization  Description: INTERVENTIONS:- Assess and monitor for signs and symptoms of infection- Monitor lab/diagnostic results- Monitor all insertion sites, i.e. indwelling lines, tubes, and drains- Monitor endotracheal if appropriate and nasal secretions for changes in amount  and color- [place] appropriate cooling/warming therapies per order- Administer medications as ordered- Instruct and encourage patient and family to use good hand hygiene technique- Identify and instruct in appropriate isolation precautions for identified infection/condition  Outcome: Progressing  Goal: Absence of fever/infection during neutropenic period  Description: INTERVENTIONS:- Monitor WBC  Outcome: Progressing     Problem: SAFETY ADULT  Goal: Patient will remain free of falls  Description: INTERVENTIONS:- Educate patient/family on patient safety including physical limitations- Instruct patient to call for assistance with activity - Consult OT/PT to assist with strengthening/mobility - Keep Call bell within reach- Keep bed low and locked with side rails adjusted as appropriate- Keep care items and personal belongings within reach- Initiate and maintain comfort rounds- Make Fall Risk Sign visible to staff INTERVENTIONS:- Educate patient/family on patient safety including physical limitations- Instruct patient to call for assistance with activity - Consult OT/PT to assist with strengthening/mobility - Keep Call bell within reach- Keep bed low and locked with side rails adjusted as appropriate- Keep care items and personal belongings within reach- Initiate and maintain comfort rounds- Make Fall Risk Sign visible to staff Consider moving patient to room near nurses station  Outcome: Progressing  Goal: Maintain or return to baseline ADL function  Description: INTERVENTIONS:-  Assess patient's ability to carry out ADLs; assess patient's baseline for ADL function and identify physical deficits which impact ability to perform ADLs (bathing, care of mouth/teeth, toileting, grooming, dressing, etc.)- Assess/evaluate cause of self-care deficits - Assess range of motion- Assess patient's mobility; develop plan if impaired- Assess patient's need for assistive devices and provide as appropriate- Encourage maximum  independence but intervene and supervise when necessary- Involve family in performance of ADLs- Assess for home care needs following discharge - Consider OT consult to assist with ADL evaluation and planning for discharge- Provide patient education as appropriate  Outcome: Progressing  Goal: Maintains/Returns to pre admission functional level  Description: INTERVENTIONS:- Perform AM-PAC 6 Click Basic Mobility/ Daily Activity assessment daily.- Set and communicate daily mobility goal to care team and patient/family/caregiver. - Collaborate with rehabilitation services on mobility goals if consulted  Outcome: Progressing     Problem: DISCHARGE PLANNING  Goal: Discharge to home or other facility with appropriate resources  Description: INTERVENTIONS:- Identify barriers to discharge w/patient and caregiver- Arrange for needed discharge resources and transportation as appropriate- Identify discharge learning needs (meds, wound care, etc.)- Arrange for interpretive services to assist at discharge as needed- Refer to Case Management Department for coordinating discharge planning if the patient needs post-hospital services based on physician/advanced practitioner order or complex needs related to functional status, cognitive ability, or social support system  Outcome: Progressing     Problem: Knowledge Deficit  Goal: Patient/family/caregiver demonstrates understanding of disease process, treatment plan, medications, and discharge instructions  Description: Complete learning assessment and assess knowledge base.Interventions:- Provide teaching at level of understanding- Provide teaching via preferred learning methods  Outcome: Progressing     Problem: GASTROINTESTINAL - ADULT  Goal: Minimal or absence of nausea and/or vomiting  Description: INTERVENTIONS:- Administer IV fluids if ordered to ensure adequate hydration- Maintain NPO status until nausea and vomiting are resolved- Nasogastric tube if ordered- Administer ordered  antiemetic medications as needed- Provide nonpharmacologic comfort measures as appropriate- Advance diet as tolerated, if ordered- Consider nutrition services referral to assist patient with adequate nutrition and appropriate food choices  Outcome: Progressing  Goal: Maintains adequate nutritional intake  Description: INTERVENTIONS:- Monitor percentage of each meal consumed- Identify factors contributing to decreased intake, treat as appropriate- Assist with meals as needed- Monitor I&O, weight, and lab values if indicated- Obtain nutrition services referral as needed  Outcome: Progressing     Problem: METABOLIC, FLUID AND ELECTROLYTES - ADULT  Goal: Electrolytes maintained within normal limits  Description: INTERVENTIONS:- Monitor labs and assess patient for signs and symptoms of electrolyte imbalances- Administer electrolyte replacement as ordered- Monitor response to electrolyte replacements, including repeat lab results as appropriate- Instruct patient on fluid and nutrition as appropriate  Outcome: Progressing  Goal: Fluid balance maintained  Description: INTERVENTIONS:- Monitor labs - Monitor I/O and WT- Instruct patient on fluid and nutrition as appropriate- Assess for signs & symptoms of volume excess or deficit  Outcome: Progressing

## 2025-04-12 NOTE — ASSESSMENT & PLAN NOTE
With outpatient diagnosis of moderate persistent asthma bronchitis versus COPD  PFTs in 2021 with normal spirometry no bronchodilator response  Continue PTA inhaler, started on DuoNebs -continue while inpatient  No noted wheezing on exam, continue PTA oral prednisone

## 2025-04-12 NOTE — ASSESSMENT & PLAN NOTE
Lab Results   Component Value Date    EGFR 80 04/12/2025    EGFR 77 04/11/2025    EGFR 85 04/10/2025    CREATININE 0.95 04/12/2025    CREATININE 0.98 04/11/2025    CREATININE 0.91 04/10/2025   Creatinine at baseline  Continue to monitor

## 2025-04-12 NOTE — PLAN OF CARE
Problem: Potential for Falls  Goal: Patient will remain free of falls  Description: INTERVENTIONS:- Educate patient/family on patient safety including physical limitations- Instruct patient to call for assistance with activity - Consult OT/PT to assist with strengthening/mobility - Keep Call bell within reach- Keep bed low and locked with side rails adjusted as appropriate- Keep care items and personal belongings within reach- Initiate and maintain comfort rounds- Make Fall Risk Sign visible to staff INTERVENTIONS:- Educate patient/family on patient safety including physical limitations- Instruct patient to call for assistance with activity - Consult OT/PT to assist with strengthening/mobility - Keep Call bell within reach- Keep bed low and locked with side rails adjusted as appropriate- Keep care items and personal belongings within reach- Initiate and maintain comfort rounds- Make Fall Risk Sign visible to staff Consider moving patient to room near nurses station  Outcome: Progressing     Problem: PAIN - ADULT  Goal: Verbalizes/displays adequate comfort level or baseline comfort level  Description: Interventions:- Encourage patient to monitor pain and request assistance- Assess pain using appropriate pain scale- Administer analgesics based on type and severity of pain and evaluate response- Implement non-pharmacological measures as appropriate and evaluate response- Consider cultural and social influences on pain and pain management- Notify physician/advanced practitioner if interventions unsuccessful or patient reports new pain  Outcome: Progressing     Problem: INFECTION - ADULT  Goal: Absence or prevention of progression during hospitalization  Description: INTERVENTIONS:- Assess and monitor for signs and symptoms of infection- Monitor lab/diagnostic results- Monitor all insertion sites, i.e. indwelling lines, tubes, and drains- Monitor endotracheal if appropriate and nasal secretions for changes in amount  and color- [place] appropriate cooling/warming therapies per order- Administer medications as ordered- Instruct and encourage patient and family to use good hand hygiene technique- Identify and instruct in appropriate isolation precautions for identified infection/condition  Outcome: Progressing  Goal: Absence of fever/infection during neutropenic period  Description: INTERVENTIONS:- Monitor WBC  Outcome: Progressing     Problem: SAFETY ADULT  Goal: Patient will remain free of falls  Description: INTERVENTIONS:- Educate patient/family on patient safety including physical limitations- Instruct patient to call for assistance with activity - Consult OT/PT to assist with strengthening/mobility - Keep Call bell within reach- Keep bed low and locked with side rails adjusted as appropriate- Keep care items and personal belongings within reach- Initiate and maintain comfort rounds- Make Fall Risk Sign visible to staff INTERVENTIONS:- Educate patient/family on patient safety including physical limitations- Instruct patient to call for assistance with activity - Consult OT/PT to assist with strengthening/mobility - Keep Call bell within reach- Keep bed low and locked with side rails adjusted as appropriate- Keep care items and personal belongings within reach- Initiate and maintain comfort rounds- Make Fall Risk Sign visible to staff Consider moving patient to room near nurses station  Outcome: Progressing  Goal: Maintain or return to baseline ADL function  Description: INTERVENTIONS:-  Assess patient's ability to carry out ADLs; assess patient's baseline for ADL function and identify physical deficits which impact ability to perform ADLs (bathing, care of mouth/teeth, toileting, grooming, dressing, etc.)- Assess/evaluate cause of self-care deficits - Assess range of motion- Assess patient's mobility; develop plan if impaired- Assess patient's need for assistive devices and provide as appropriate- Encourage maximum  independence but intervene and supervise when necessary- Involve family in performance of ADLs- Assess for home care needs following discharge - Consider OT consult to assist with ADL evaluation and planning for discharge- Provide patient education as appropriate  Outcome: Progressing  Goal: Maintains/Returns to pre admission functional level  Description: INTERVENTIONS:- Perform AM-PAC 6 Click Basic Mobility/ Daily Activity assessment daily.- Set and communicate daily mobility goal to care team and patient/family/caregiver. - Collaborate with rehabilitation services on mobility goals if consulted  Outcome: Progressing     Problem: DISCHARGE PLANNING  Goal: Discharge to home or other facility with appropriate resources  Description: INTERVENTIONS:- Identify barriers to discharge w/patient and caregiver- Arrange for needed discharge resources and transportation as appropriate- Identify discharge learning needs (meds, wound care, etc.)- Arrange for interpretive services to assist at discharge as needed- Refer to Case Management Department for coordinating discharge planning if the patient needs post-hospital services based on physician/advanced practitioner order or complex needs related to functional status, cognitive ability, or social support system  Outcome: Progressing     Problem: Knowledge Deficit  Goal: Patient/family/caregiver demonstrates understanding of disease process, treatment plan, medications, and discharge instructions  Description: Complete learning assessment and assess knowledge base.Interventions:- Provide teaching at level of understanding- Provide teaching via preferred learning methods  Outcome: Progressing     Problem: GASTROINTESTINAL - ADULT  Goal: Minimal or absence of nausea and/or vomiting  Description: INTERVENTIONS:- Administer IV fluids if ordered to ensure adequate hydration- Maintain NPO status until nausea and vomiting are resolved- Nasogastric tube if ordered- Administer ordered  antiemetic medications as needed- Provide nonpharmacologic comfort measures as appropriate- Advance diet as tolerated, if ordered- Consider nutrition services referral to assist patient with adequate nutrition and appropriate food choices  Outcome: Progressing  Goal: Maintains adequate nutritional intake  Description: INTERVENTIONS:- Monitor percentage of each meal consumed- Identify factors contributing to decreased intake, treat as appropriate- Assist with meals as needed- Monitor I&O, weight, and lab values if indicated- Obtain nutrition services referral as needed  Outcome: Progressing     Problem: METABOLIC, FLUID AND ELECTROLYTES - ADULT  Goal: Electrolytes maintained within normal limits  Description: INTERVENTIONS:- Monitor labs and assess patient for signs and symptoms of electrolyte imbalances- Administer electrolyte replacement as ordered- Monitor response to electrolyte replacements, including repeat lab results as appropriate- Instruct patient on fluid and nutrition as appropriate  Outcome: Progressing  Goal: Fluid balance maintained  Description: INTERVENTIONS:- Monitor labs - Monitor I/O and WT- Instruct patient on fluid and nutrition as appropriate- Assess for signs & symptoms of volume excess or deficit  Outcome: Progressing

## 2025-04-12 NOTE — ASSESSMENT & PLAN NOTE
Lab Results   Component Value Date    HGBA1C 6.6 (A) 01/29/2025     Recent Labs     04/11/25  1523 04/11/25 2021 04/12/25  0733 04/12/25  1115   POCGLU 197* 130 97 132     PTA glargine 18 un at bedtime, 12 units with meals  Patient has CGM and feels more comfortable monitoring this himself, nonformulary order placed  Monitor for hypoglycemia

## 2025-04-12 NOTE — ASSESSMENT & PLAN NOTE
Evident by tachycardia, tachypnea, leukocytosis, source pneumonia  Antibiotics as stated above  Blood cultures negative at 48 hours  Improving

## 2025-04-13 VITALS
HEART RATE: 104 BPM | BODY MASS INDEX: 27.57 KG/M2 | OXYGEN SATURATION: 93 % | HEIGHT: 73 IN | TEMPERATURE: 97.3 F | WEIGHT: 208 LBS | SYSTOLIC BLOOD PRESSURE: 163 MMHG | DIASTOLIC BLOOD PRESSURE: 96 MMHG | RESPIRATION RATE: 18 BRPM

## 2025-04-13 PROBLEM — N18.2 CHRONIC KIDNEY DISEASE, STAGE II (MILD): Status: ACTIVE | Noted: 2017-08-21

## 2025-04-13 LAB
ANION GAP SERPL CALCULATED.3IONS-SCNC: 6 MMOL/L (ref 4–13)
BACTERIA SPT RESP CULT: ABNORMAL
BACTERIA SPT RESP CULT: ABNORMAL
BUN SERPL-MCNC: 15 MG/DL (ref 5–25)
CALCIUM SERPL-MCNC: 9.3 MG/DL (ref 8.4–10.2)
CHLORIDE SERPL-SCNC: 109 MMOL/L (ref 96–108)
CO2 SERPL-SCNC: 25 MMOL/L (ref 21–32)
CREAT SERPL-MCNC: 0.89 MG/DL (ref 0.6–1.3)
ERYTHROCYTE [DISTWIDTH] IN BLOOD BY AUTOMATED COUNT: 13.5 % (ref 11.6–15.1)
GFR SERPL CREATININE-BSD FRML MDRD: 86 ML/MIN/1.73SQ M
GLUCOSE SERPL-MCNC: 121 MG/DL (ref 65–140)
GLUCOSE SERPL-MCNC: 75 MG/DL (ref 65–140)
GLUCOSE SERPL-MCNC: 81 MG/DL (ref 65–140)
GRAM STN SPEC: ABNORMAL
HCT VFR BLD AUTO: 39.4 % (ref 36.5–49.3)
HGB BLD-MCNC: 12.8 G/DL (ref 12–17)
MCH RBC QN AUTO: 30.1 PG (ref 26.8–34.3)
MCHC RBC AUTO-ENTMCNC: 32.5 G/DL (ref 31.4–37.4)
MCV RBC AUTO: 93 FL (ref 82–98)
PLATELET # BLD AUTO: 237 THOUSANDS/UL (ref 149–390)
PMV BLD AUTO: 10.6 FL (ref 8.9–12.7)
POTASSIUM SERPL-SCNC: 3.9 MMOL/L (ref 3.5–5.3)
POTASSIUM SERPL-SCNC: 5.4 MMOL/L (ref 3.5–5.3)
RBC # BLD AUTO: 4.25 MILLION/UL (ref 3.88–5.62)
SODIUM SERPL-SCNC: 140 MMOL/L (ref 135–147)
WBC # BLD AUTO: 3.93 THOUSAND/UL (ref 4.31–10.16)

## 2025-04-13 PROCEDURE — 82948 REAGENT STRIP/BLOOD GLUCOSE: CPT

## 2025-04-13 PROCEDURE — 94760 N-INVAS EAR/PLS OXIMETRY 1: CPT

## 2025-04-13 PROCEDURE — 80048 BASIC METABOLIC PNL TOTAL CA: CPT

## 2025-04-13 PROCEDURE — 99222 1ST HOSP IP/OBS MODERATE 55: CPT | Performed by: INTERNAL MEDICINE

## 2025-04-13 PROCEDURE — 84132 ASSAY OF SERUM POTASSIUM: CPT

## 2025-04-13 PROCEDURE — 85027 COMPLETE CBC AUTOMATED: CPT

## 2025-04-13 PROCEDURE — 94640 AIRWAY INHALATION TREATMENT: CPT

## 2025-04-13 PROCEDURE — 99232 SBSQ HOSP IP/OBS MODERATE 35: CPT

## 2025-04-13 RX ORDER — EZETIMIBE 10 MG/1
10 TABLET ORAL
Status: DISCONTINUED | OUTPATIENT
Start: 2025-04-13 | End: 2025-04-13 | Stop reason: HOSPADM

## 2025-04-13 RX ORDER — HYDROCHLOROTHIAZIDE 12.5 MG/1
12.5 TABLET ORAL DAILY
Status: DISCONTINUED | OUTPATIENT
Start: 2025-04-14 | End: 2025-04-13 | Stop reason: HOSPADM

## 2025-04-13 RX ORDER — HYDROCHLOROTHIAZIDE 12.5 MG/1
12.5 TABLET ORAL DAILY
Qty: 30 TABLET | Refills: 0 | Status: SHIPPED | OUTPATIENT
Start: 2025-04-14

## 2025-04-13 RX ORDER — POTASSIUM CHLORIDE 1500 MG/1
20 TABLET, EXTENDED RELEASE ORAL DAILY
Qty: 30 TABLET | Refills: 0 | Status: SHIPPED | OUTPATIENT
Start: 2025-04-14 | End: 2025-04-27

## 2025-04-13 RX ORDER — EZETIMIBE 10 MG/1
10 TABLET ORAL
Qty: 30 TABLET | Refills: 0 | Status: SHIPPED | OUTPATIENT
Start: 2025-04-13

## 2025-04-13 RX ORDER — CEFUROXIME AXETIL 500 MG/1
500 TABLET ORAL EVERY 12 HOURS SCHEDULED
Qty: 6 TABLET | Refills: 0 | Status: SHIPPED | OUTPATIENT
Start: 2025-04-13 | End: 2025-04-16

## 2025-04-13 RX ORDER — DOXYCYCLINE 100 MG/1
100 CAPSULE ORAL EVERY 12 HOURS SCHEDULED
Qty: 2 CAPSULE | Refills: 0 | Status: SHIPPED | OUTPATIENT
Start: 2025-04-13 | End: 2025-04-14

## 2025-04-13 RX ORDER — BENZONATATE 200 MG/1
200 CAPSULE ORAL 3 TIMES DAILY PRN
Qty: 21 CAPSULE | Refills: 0 | Status: SHIPPED | OUTPATIENT
Start: 2025-04-13

## 2025-04-13 RX ORDER — POTASSIUM CHLORIDE 1500 MG/1
20 TABLET, EXTENDED RELEASE ORAL DAILY
Status: DISCONTINUED | OUTPATIENT
Start: 2025-04-14 | End: 2025-04-13 | Stop reason: HOSPADM

## 2025-04-13 RX ADMIN — ASPIRIN 81 MG: 81 TABLET, COATED ORAL at 09:06

## 2025-04-13 RX ADMIN — B-COMPLEX W/ C & FOLIC ACID TAB 1 TABLET: TAB at 09:06

## 2025-04-13 RX ADMIN — PANTOPRAZOLE SODIUM 40 MG: 40 TABLET, DELAYED RELEASE ORAL at 05:14

## 2025-04-13 RX ADMIN — DOXYCYCLINE HYCLATE 100 MG: 100 CAPSULE ORAL at 09:06

## 2025-04-13 RX ADMIN — CITALOPRAM HYDROBROMIDE 40 MG: 20 TABLET ORAL at 09:06

## 2025-04-13 RX ADMIN — FENOFIBRATE 145 MG: 145 TABLET ORAL at 09:13

## 2025-04-13 RX ADMIN — ACYCLOVIR 400 MG: 800 TABLET ORAL at 09:07

## 2025-04-13 RX ADMIN — AMLODIPINE BESYLATE 10 MG: 10 TABLET ORAL at 09:06

## 2025-04-13 RX ADMIN — GABAPENTIN 600 MG: 300 CAPSULE ORAL at 09:06

## 2025-04-13 RX ADMIN — IPRATROPIUM BROMIDE AND ALBUTEROL SULFATE 3 ML: .5; 3 SOLUTION RESPIRATORY (INHALATION) at 07:38

## 2025-04-13 RX ADMIN — PREDNISONE 5 MG: 5 TABLET ORAL at 09:06

## 2025-04-13 RX ADMIN — FLUTICASONE FUROATE AND VILANTEROL TRIFENATATE 1 PUFF: 200; 25 POWDER RESPIRATORY (INHALATION) at 09:07

## 2025-04-13 RX ADMIN — BENZONATATE 200 MG: 100 CAPSULE ORAL at 05:14

## 2025-04-13 RX ADMIN — IBRUTINIB 140 MG: 140 TABLET, FILM COATED ORAL at 09:07

## 2025-04-13 RX ADMIN — LOSARTAN POTASSIUM 100 MG: 50 TABLET, FILM COATED ORAL at 09:05

## 2025-04-13 RX ADMIN — FOLIC ACID TAB 400 MCG 800 MCG: 400 TAB at 09:06

## 2025-04-13 NOTE — ASSESSMENT & PLAN NOTE
With suboptimally controlled blood pressure  Continue amlodipine 10 mg daily, losartan 100 mg daily  Patient has been on labetalol in the past but had poor side effects  Consider selective beta-blocker pending blood pressure trend

## 2025-04-13 NOTE — ASSESSMENT & PLAN NOTE
Presented with progressive cough, fevers/chills, decreased appetite  Was diagnosed with influenza approximately 1 month ago  Viral panel on admission negative  Met septic criteria on admission, CT chest with multifocal pneumonia  Sputum culture positive for H influenza  Continue IV Rocephin day 5, continue doxycycline day 4 for atypical coverage in setting of immunosuppression  Continue antitussives  Has been ambulating without hypoxia  Anticipate discharge home within 24 hours

## 2025-04-13 NOTE — ASSESSMENT & PLAN NOTE
Evident by tachycardia, tachypnea, leukocytosis, source pneumonia  Antibiotics as stated above  Blood cultures negative at 72 hours  Improving

## 2025-04-13 NOTE — ASSESSMENT & PLAN NOTE
Lab Results   Component Value Date    HGBA1C 6.6 (A) 01/29/2025     Recent Labs     04/12/25  1115 04/12/25  1552 04/12/25 2058 04/13/25  0741   POCGLU 132 182* 108 81     PTA glargine 18 un at bedtime, 12 units with meals  Patient has CGM and feels more comfortable monitoring this himself, nonformulary order placed  Monitor for hypoglycemia

## 2025-04-13 NOTE — ASSESSMENT & PLAN NOTE
--Lipid panel 4/16/2024: Cholesterol 183, triglycerides 94, HDL 33,   --Patient is on fenofibrate which does lower triglycerides but does not lower the LDL.  --Patient was on simvastatin in the past and said that it was causing him to lose muscle mass.  In the chart it says he had myalgias.  He does not quite remember what transpired when he was on the simvastatin but I am concerned that maybe he had rhabdomyolysis and statins need to be avoided.  --Will begin ezetimibe 10 mg daily although I doubt that it will be strong enough to bring his LDL from 140 to a goal of 70.  -- As an outpatient would consider trying to get approval for Nexletol (Bempedoic Acid) 180 mg daily.  If he cannot get it approved to get it in the [place], he can buy 100 tablets from [employer] $139 ($42/month)

## 2025-04-13 NOTE — ASSESSMENT & PLAN NOTE
With outpatient diagnosis of moderate persistent asthma bronchitis versus COPD  PFTs in 2021 with normal spirometry no bronchodilator response  Continue PTA inhaler, started on DuoNebs - continue while inpatient  No noted wheezing on exam, continue PTA oral prednisone

## 2025-04-13 NOTE — PROGRESS NOTES
Progress Note - Hospitalist   Name: Cayetano Lucero 69 y.o. male I MRN: [id number]  Unit/Bed#: E5 -01 I Date of Admission: 4/9/2025   Date of Service: 4/13/2025 I Hospital Day: 3    Assessment & Plan  Multifocal pneumonia  Presented with progressive cough, fevers/chills, decreased appetite  Was diagnosed with influenza approximately 1 month ago  Viral panel on admission negative  Met septic criteria on admission, CT chest with multifocal pneumonia  Sputum culture positive for H influenza  Continue IV Rocephin day 5, continue doxycycline day 4 for atypical coverage in setting of immunosuppression  Continue antitussives  Has been ambulating without hypoxia  Anticipate discharge home within 24 hours  Sepsis without acute organ dysfunction (HCC)  Evident by tachycardia, tachypnea, leukocytosis, source pneumonia  Antibiotics as stated above  Blood cultures negative at 72 hours  Improving  Dyspnea on exertion  Suspect this could be multifactorial in setting of underlying COPD with multifocal pneumonia although patient notes this has been ongoing for months prior to influenza diagnosis  No exertional or resting chest pain  No SOB at rest  No clinical signs or symptoms of heart failure  Last echocardiogram with EF 60% in 2018  Echocardiogram here with slightly decreased ejection fraction 52%, abnormal septal motion indeterminate diastolic dysfunction  Given remote history of stent and progressive CARBALLO appreciate cardiology evaluation to comment if patient would benefit from further inpatient work up vs outpatient follow up  CAD (coronary artery disease)  Remote history in 2004, has 1 stent. Continue aspirin, fenofibrate  Chronic obstructive pulmonary disease (HCC)  With outpatient diagnosis of moderate persistent asthma bronchitis versus COPD  PFTs in 2021 with normal spirometry no bronchodilator response  Continue PTA inhaler, started on DuoNebs - continue while inpatient  No noted wheezing on exam, continue PTA oral  prednisone  CLL (chronic lymphocytic leukemia) ([facility])  Known to Dr. Mejia, patient on ibrutinib and prednisone daily  Also receiving IVIG and Gazyva every 4 weeks  Hematology consulted here, messaged patient's outpatient oncology team   Hypogammaglobulinemia, acquired ([facility])  Receives IVIG as an outpatient  IgG levels here 318  Status post IVIG infusion 4/11/2025  Hyperlipidemia  Continue fenofibrate  Hypertension  With suboptimally controlled blood pressure  Continue amlodipine 10 mg daily, losartan 100 mg daily  Patient has been on labetalol in the past but had poor side effects  Consider selective beta-blocker pending blood pressure trend  Stage 3 chronic kidney disease, unspecified whether stage 3a or 3b CKD ([facility])  Lab Results   Component Value Date    EGFR 86 04/13/2025    EGFR 80 04/12/2025    EGFR 77 04/11/2025    CREATININE 0.89 04/13/2025    CREATININE 0.95 04/12/2025    CREATININE 0.98 04/11/2025   Creatinine at baseline  Continue to monitor  Gastroesophageal reflux disease without esophagitis  Patient did undergo paraesophageal hernia repair 6/7/2024  Tolerating diet without concerns, although does admit to a few episodes of intermittent dysphagia  He is known to Dr. Gonsales, reviewed office visits and post op follow ups  Of note his CT chest does display persistent distal esophageal thickening which I discussed with him  Continue PPI  Recommend discussing with Dr. Gonsales as an outpatient, patient agreeable  Depression, recurrent ([facility])  Continue Celexa  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[facility])  Noted, patient is not on heparin products  Long term (current) use of systemic steroids  PTA prednisone 5 mg daily  Type 2 diabetes mellitus with diabetic polyneuropathy, with long-term current use of insulin ([facility])  Lab Results   Component Value Date    HGBA1C 6.6 (A) 01/29/2025     Recent Labs     04/12/25  1115 04/12/25  1552 04/12/25  2058 04/13/25  0741   POCGLU 132 182* 108 81     PTA glargine 18 un  at bedtime, 12 units with meals  Patient has CGM and feels more comfortable monitoring this himself, nonformulary order placed  Monitor for hypoglycemia  Pulmonary nodule  6 mm left upper lobe groundglass nodule noted on CT  Discussed with patient malignancy cannot be ruled out, he expressed understanding  Will need follow up non-contrast in 6-12 months to ensure stability, then additional follow up every 2 years until 5 year stability is demonstrated  Incidental finding note completed    VTE Pharmacologic Prophylaxis:   Low Risk (Score 0-2) - Encourage Ambulation.    Mobility:   Basic Mobility Inpatient Raw Score: 24  JH-HLM Goal: 8: Walk 250 feet or more  JH-HLM Achieved: 8: Walk 250 feet ot more  JH-HLM Goal achieved. Continue to encourage appropriate mobility.    Patient Centered Rounds: I performed bedside rounds with nursing staff today.     Education and Discussions with Family / Patient: Patient    Current Length of Stay: 3 day(s)  Current Patient Status: Inpatient   Certification Statement: The patient will continue to require additional inpatient hospital stay due to abnormal echocardiogram, cardiology evaluation  Discharge Plan: Anticipate discharge tomorrow to home.    Code Status: Level 1 - Full Code    Subjective   Patient seen and examined.  Seen resting in bed.  Slept good last night.  He still is coughing but notes the mucus is starting to thin out.  Denies any fevers, chills or current chest pain.  Had a bowel movement this morning.  No diarrhea.  Walked yesterday in the halls and did not require oxygen but did have shortness of breath.  We reviewed his echocardiogram and he reports he would feel comfortable getting evaluated prior to going home.  Also reviewed his sputum culture.  All questions answered.    Objective :  Temp:  [97.3 °F (36.3 °C)-98.5 °F (36.9 °C)] 97.3 °F (36.3 °C)  HR:  [52-87] 87  BP: (143-180)/(79-87) 143/82  Resp:  [18] 18  SpO2:  [89 %-99 %] 96 %  O2 Device: None (Room  air)    Body mass index is 27.44 kg/m².     Input and Output Summary (last 24 hours):     Intake/Output Summary (Last 24 hours) at 4/13/2025 1055  Last data filed at 4/13/2025 0823  Gross per 24 hour   Intake 618 ml   Output 1000 ml   Net -382 ml       Physical Exam  Vitals and nursing note reviewed.   Constitutional:       Appearance: Normal appearance. He is obese. He is not ill-appearing or diaphoretic.   Cardiovascular:      Rate and Rhythm: Normal rate and regular rhythm.   Pulmonary:      Effort: Pulmonary effort is normal. No respiratory distress.      Breath sounds: Rhonchi (bases) present.      Comments: Room air  Abdominal:      General: Bowel sounds are normal.      Palpations: Abdomen is soft.      Tenderness: There is no abdominal tenderness.   Musculoskeletal:      Right lower leg: No edema.      Left lower leg: No edema.   Skin:     General: Skin is warm and dry.      Coloration: Skin is pale.   Neurological:      Mental Status: He is alert and oriented to person, place, and time. Mental status is at baseline.   Psychiatric:         Mood and Affect: Mood normal.         Behavior: Behavior normal.         Lab Results: I have reviewed the following results:   Results from last 7 days   Lab Units 04/13/25  0510 04/12/25  0622 04/11/25  0614 04/10/25  0545   WBC Thousand/uL 3.93* 4.53   < > 5.97   HEMOGLOBIN g/dL 12.8 12.7   < > 13.6   HEMATOCRIT % 39.4 38.1   < > 40.8   PLATELETS Thousands/uL 237 263   < > 259   BANDS PCT %  --  5   < >  --    SEGS PCT %  --   --   --  75   LYMPHO PCT %  --  15   < > 5*   MONO PCT %  --  14*   < > 8   EOS PCT %  --  0   < > 0    < > = values in this interval not displayed.     Results from last 7 days   Lab Units 04/13/25  0844 04/13/25  0510 04/11/25  0614 04/10/25  0435   SODIUM mmol/L  --  140   < > 137   POTASSIUM mmol/L 3.9 5.4*   < > 4.2   CHLORIDE mmol/L  --  109*   < > 104   CO2 mmol/L  --  25   < > 24   BUN mg/dL  --  15   < > 23   CREATININE mg/dL  --  0.89   <  > 0.91   ANION GAP mmol/L  --  6   < > 9   CALCIUM mg/dL  --  9.3   < > 9.0   ALBUMIN g/dL  --   --   --  3.8   TOTAL BILIRUBIN mg/dL  --   --   --  0.42   ALK PHOS U/L  --   --   --  34   ALT U/L  --   --   --  13   AST U/L  --   --   --  20   GLUCOSE RANDOM mg/dL  --  75   < > 193*    < > = values in this interval not displayed.     Results from last 7 days   Lab Units 04/09/25  1536   INR  1.34*     Results from last 7 days   Lab Units 04/13/25  0741 04/12/25  2058 04/12/25  1552 04/12/25  1115 04/12/25  0733 04/11/25  2021 04/11/25  1523 04/11/25  1057 04/11/25  0737 04/10/25  2135 04/10/25  1558 04/10/25  1120   POC GLUCOSE mg/dl 81 108 182* 132 97 130 197* 79 90 161* 190* 161*     Results from last 7 days   Lab Units 04/10/25  0434 04/09/25  1931 04/09/25  1536   LACTIC ACID mmol/L  --   --  1.4   PROCALCITONIN ng/ml 0.15 0.18  --      Recent Cultures (last 7 days):   Results from last 7 days   Lab Units 04/10/25  1018 04/10/25  1016 04/09/25  1536   BLOOD CULTURE   --   --  No Growth at 72 hrs.  No Growth at 72 hrs.   SPUTUM CULTURE  2 colonies Haemophilus influenzae*  3+ Growth of  --   --    GRAM STAIN RESULT  2+ Epithelial cells per low power field*  2+ Gram positive cocci in pairs*  2+ Gram negative coccobacilli*  2+ Polys*  --   --    LEGIONELLA URINARY ANTIGEN   --  Negative  --      Last 24 Hours Medication List:     Current Facility-Administered Medications:     acetaminophen (TYLENOL) tablet 650 mg, Q6H PRN    acyclovir (ZOVIRAX) tablet 400 mg, BID    albuterol (PROVENTIL HFA,VENTOLIN HFA) inhaler 2 puff, Q4H PRN    amLODIPine (NORVASC) tablet 10 mg, Daily    aspirin (ECOTRIN LOW STRENGTH) EC tablet 81 mg, Daily    benzonatate (TESSALON PERLES) capsule 200 mg, Q8H    cefTRIAXone (ROCEPHIN) 1,000 mg in dextrose 5 % 50 mL IVPB, Q24H, Last Rate: 1,000 mg (04/12/25 8995)    citalopram (CeleXA) tablet 40 mg, Daily    doxycycline hyclate (VIBRAMYCIN) capsule 100 mg, Q12H OTONIEL    fenofibrate (TRICOR)  tablet 145 mg, Daily    fluticasone-vilanterol 200-25 mcg/actuation 1 puff, Daily    folic acid (FOLVITE) tablet 800 mcg, Daily    gabapentin (NEURONTIN) capsule 600 mg, Daily    Ibrutinib TABS 140 mg, Daily    insulin degludec (TRESIBA) 100 units/mL injection pen 18 Units, HS    insulin lispro (HumaLOG) 100 units/mL subcutaneous injection pen (1-unit dial) 12 Units, TID AC    ipratropium-albuterol (DUO-NEB) 0.5-2.5 mg/3 mL inhalation solution 3 mL, TID    levalbuterol (XOPENEX) inhalation solution 1.25 mg, Q6H PRN    LORazepam (ATIVAN) tablet 0.5 mg, Daily PRN    losartan (COZAAR) tablet 100 mg, Daily    multivitamin stress formula tablet 1 tablet, Daily    ondansetron (ZOFRAN) injection 4 mg, Q6H PRN    oxyCODONE (ROXICODONE) immediate release tablet 10 mg, Q6H PRN    pantoprazole (PROTONIX) EC tablet 40 mg, Daily Before Breakfast    predniSONE tablet 5 mg, Daily    Administrative Statements   Today, Patient Was Seen By: Christina Johnson PA-C    **Please Note: This note may have been constructed using a voice recognition system.**

## 2025-04-13 NOTE — ASSESSMENT & PLAN NOTE
Lab Results   Component Value Date    EGFR 86 04/13/2025    EGFR 80 04/12/2025    EGFR 77 04/11/2025    CREATININE 0.89 04/13/2025    CREATININE 0.95 04/12/2025    CREATININE 0.98 04/11/2025

## 2025-04-13 NOTE — ASSESSMENT & PLAN NOTE
Lab Results   Component Value Date    HGBA1C 6.6 (A) 01/29/2025       Recent Labs     04/12/25  1552 04/12/25 2058 04/13/25  0741 04/13/25  1121   POCGLU 182* 108 81 121       Blood Sugar Average: Last 72 hrs:  (P) [id number]

## 2025-04-13 NOTE — ASSESSMENT & PLAN NOTE
--In 2004 he had pain in both arms especially his left arm.  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  --He had a stent placed in the right coronary artery.  --He has not had any recurrence  --His recent echocardiogram demonstrates normal inferior wall motion so despite the inferior Q waves, he has had minimal damage.  --4/11/2025 echocardiogram was read by Dr. Stern as having a low normal ejection fraction of 52% and abnormal septal motion.  Echocardiogram from 2018 was read as an ejection fraction of 60% with no regional wall motion abnormalities.  Reviewed most recent echocardiogram with Dr. Stern and after reviewing it a second time, he felt that the ejection fraction was closer to 55% and that the abnormal septal motion was questionable.  Due to patient's emphysema, the pictures are suboptimal and endocardial detection is limited.  I reviewed the echocardiogram from 2018 and would put the ejection fraction closer to 55% than 60%.  Septal motion on that echocardiogram also appears questionable.  -- BNP this admission was 118.  Normal BNP is less than 100 but in a patient that is 68Y/O, I would consider 118 as normal and not diagnostic of heart failure.  -- Do not feel that there is a cardiac abnormality which is contributing to patient's shortness of breath.

## 2025-04-13 NOTE — PLAN OF CARE
Problem: Potential for Falls  Goal: Patient will remain free of falls  Description: INTERVENTIONS:- Educate patient/family on patient safety including physical limitations- Instruct patient to call for assistance with activity - Consult OT/PT to assist with strengthening/mobility - Keep Call bell within reach- Keep bed low and locked with side rails adjusted as appropriate- Keep care items and personal belongings within reach- Initiate and maintain comfort rounds- Make Fall Risk Sign visible to staff INTERVENTIONS:- Educate patient/family on patient safety including physical limitations- Instruct patient to call for assistance with activity - Consult OT/PT to assist with strengthening/mobility - Keep Call bell within reach- Keep bed low and locked with side rails adjusted as appropriate- Keep care items and personal belongings within reach- Initiate and maintain comfort rounds- Make Fall Risk Sign visible to staff Consider moving patient to room near nurses station  Outcome: Progressing     Problem: PAIN - ADULT  Goal: Verbalizes/displays adequate comfort level or baseline comfort level  Description: Interventions:- Encourage patient to monitor pain and request assistance- Assess pain using appropriate pain scale- Administer analgesics based on type and severity of pain and evaluate response- Implement non-pharmacological measures as appropriate and evaluate response- Consider cultural and social influences on pain and pain management- Notify physician/advanced practitioner if interventions unsuccessful or patient reports new pain  Outcome: Progressing     Problem: INFECTION - ADULT  Goal: Absence or prevention of progression during hospitalization  Description: INTERVENTIONS:- Assess and monitor for signs and symptoms of infection- Monitor lab/diagnostic results- Monitor all insertion sites, i.e. indwelling lines, tubes, and drains- Monitor endotracheal if appropriate and nasal secretions for changes in amount  and color- Gooding appropriate cooling/warming therapies per order- Administer medications as ordered- Instruct and encourage patient and family to use good hand hygiene technique- Identify and instruct in appropriate isolation precautions for identified infection/condition  Outcome: Progressing  Goal: Absence of fever/infection during neutropenic period  Description: INTERVENTIONS:- Monitor WBC  Outcome: Progressing     Problem: SAFETY ADULT  Goal: Patient will remain free of falls  Description: INTERVENTIONS:- Educate patient/family on patient safety including physical limitations- Instruct patient to call for assistance with activity - Consult OT/PT to assist with strengthening/mobility - Keep Call bell within reach- Keep bed low and locked with side rails adjusted as appropriate- Keep care items and personal belongings within reach- Initiate and maintain comfort rounds- Make Fall Risk Sign visible to staff INTERVENTIONS:- Educate patient/family on patient safety including physical limitations- Instruct patient to call for assistance with activity - Consult OT/PT to assist with strengthening/mobility - Keep Call bell within reach- Keep bed low and locked with side rails adjusted as appropriate- Keep care items and personal belongings within reach- Initiate and maintain comfort rounds- Make Fall Risk Sign visible to staff Consider moving patient to room near nurses station  Outcome: Progressing  Goal: Maintain or return to baseline ADL function  Description: INTERVENTIONS:-  Assess patient's ability to carry out ADLs; assess patient's baseline for ADL function and identify physical deficits which impact ability to perform ADLs (bathing, care of mouth/teeth, toileting, grooming, dressing, etc.)- Assess/evaluate cause of self-care deficits - Assess range of motion- Assess patient's mobility; develop plan if impaired- Assess patient's need for assistive devices and provide as appropriate- Encourage maximum  independence but intervene and supervise when necessary- Involve family in performance of ADLs- Assess for home care needs following discharge - Consider OT consult to assist with ADL evaluation and planning for discharge- Provide patient education as appropriate  Outcome: Progressing  Goal: Maintains/Returns to pre admission functional level  Description: INTERVENTIONS:- Perform AM-PAC 6 Click Basic Mobility/ Daily Activity assessment daily.- Set and communicate daily mobility goal to care team and patient/family/caregiver. - Collaborate with rehabilitation services on mobility goals if consulted  Outcome: Progressing     Problem: DISCHARGE PLANNING  Goal: Discharge to home or other facility with appropriate resources  Description: INTERVENTIONS:- Identify barriers to discharge w/patient and caregiver- Arrange for needed discharge resources and transportation as appropriate- Identify discharge learning needs (meds, wound care, etc.)- Arrange for interpretive services to assist at discharge as needed- Refer to Case Management Department for coordinating discharge planning if the patient needs post-hospital services based on physician/advanced practitioner order or complex needs related to functional status, cognitive ability, or social support system  Outcome: Progressing     Problem: Knowledge Deficit  Goal: Patient/family/caregiver demonstrates understanding of disease process, treatment plan, medications, and discharge instructions  Description: Complete learning assessment and assess knowledge base.Interventions:- Provide teaching at level of understanding- Provide teaching via preferred learning methods  Outcome: Progressing     Problem: GASTROINTESTINAL - ADULT  Goal: Minimal or absence of nausea and/or vomiting  Description: INTERVENTIONS:- Administer IV fluids if ordered to ensure adequate hydration- Maintain NPO status until nausea and vomiting are resolved- Nasogastric tube if ordered- Administer ordered  antiemetic medications as needed- Provide nonpharmacologic comfort measures as appropriate- Advance diet as tolerated, if ordered- Consider nutrition services referral to assist patient with adequate nutrition and appropriate food choices  Outcome: Progressing  Goal: Maintains adequate nutritional intake  Description: INTERVENTIONS:- Monitor percentage of each meal consumed- Identify factors contributing to decreased intake, treat as appropriate- Assist with meals as needed- Monitor I&O, weight, and lab values if indicated- Obtain nutrition services referral as needed  Outcome: Progressing     Problem: METABOLIC, FLUID AND ELECTROLYTES - ADULT  Goal: Electrolytes maintained within normal limits  Description: INTERVENTIONS:- Monitor labs and assess patient for signs and symptoms of electrolyte imbalances- Administer electrolyte replacement as ordered- Monitor response to electrolyte replacements, including repeat lab results as appropriate- Instruct patient on fluid and nutrition as appropriate  Outcome: Progressing  Goal: Fluid balance maintained  Description: INTERVENTIONS:- Monitor labs - Monitor I/O and WT- Instruct patient on fluid and nutrition as appropriate- Assess for signs & symptoms of volume excess or deficit  Outcome: Progressing

## 2025-04-13 NOTE — ASSESSMENT & PLAN NOTE
--CT scan of the chest demonstrates a multifocal pneumonia.  --Patient was referred to the ED/hospital by his pulmonary doctor who detected that he was acutely ill  --On admission he met criteria for sepsis  --He is on antibiotic therapy  I--s oxygenating well on room air at the present time

## 2025-04-13 NOTE — ASSESSMENT & PLAN NOTE
Lab Results   Component Value Date    EGFR 86 04/13/2025    EGFR 80 04/12/2025    EGFR 77 04/11/2025    CREATININE 0.89 04/13/2025    CREATININE 0.95 04/12/2025    CREATININE 0.98 04/11/2025   Creatinine at baseline  Continue to monitor

## 2025-04-13 NOTE — CONSULTS
Consultation - Cardiology   Name: Cayetano Lucero 69 y.o. male I MRN: [id number]  Unit/Bed#: E5 -01 I Date of Admission: 4/9/2025   Date of Service: 4/13/2025 I Hospital Day: 3   Inpatient consult to Cardiology  Consult performed by: Gulshan Wolfe MD  Consult ordered by: Christina Johnson PA-C        Physician Requesting Evaluation: Mayelin Reese DO   Reason for Evaluation / Principal Problem: Increasing dyspnea on exertion    Assessment & Plan  Chronic obstructive pulmonary disease (HCC)  Patient states over the last year, maybe longer he has become more short of breath.  He feels that in the past year his exercise tolerance has dropped approximately 30%.  He also has developed a chronic cough.  He has a long tobacco use history quitting approximately 10 years ago.  He smoked cigarettes for 20+ years and at the end was smoking 2 packs daily.  After smoking cigarettes he went to small cigars and smoked approximately 6 daily and inhaled them.  After quitting, he never went back to smoking.  He has been diagnosed with emphysema and is prone to infections due to CLL even though it is in remission.  This year he has had influenza, acute bronchitis and now a multifocal pneumonia.  He stated that in the last day he walked around the fifth floor and when he was [facility] back he started to puff and had to rest.  He states that a year ago, he would not have had to rest.  Multifocal pneumonia  --CT scan of the chest demonstrates a multifocal pneumonia.  --Patient was referred to the ED/hospital by his pulmonary doctor who detected that he was acutely ill  --On admission he met criteria for sepsis  --He is on antibiotic therapy  I--s oxygenating well on room air at the present time  CAD (coronary artery disease)  --In 2004 he had pain in both arms especially his left arm.  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  --He had a stent placed in the right coronary artery.  --He has  not had any recurrence  --His recent echocardiogram demonstrates normal inferior wall motion so despite the inferior Q waves, he has had minimal damage.  --4/11/2025 echocardiogram was read by Dr. Stern as having a low normal ejection fraction of 52% and abnormal septal motion.  Echocardiogram from 2018 was read as an ejection fraction of 60% with no regional wall motion abnormalities.  Reviewed most recent echocardiogram with Dr. Stern and after reviewing it a second time, he felt that the ejection fraction was closer to 55% and that the abnormal septal motion was questionable.  Due to patient's emphysema, the pictures are suboptimal and endocardial detection is limited.  I reviewed the echocardiogram from 2018 and would put the ejection fraction closer to 55% than 60%.  Septal motion on that echocardiogram also appears questionable.  -- BNP this admission was 118.  Normal BNP is less than 100 but in a patient that is 68Y/O, I would consider 118 as normal and not diagnostic of heart failure.  -- Do not feel that there is a cardiac abnormality which is contributing to patient's shortness of breath.  CLL (chronic lymphocytic leukemia) (HCC)  Well-controlled  Receives monthly IVIG infusions  Hyperlipidemia  --Lipid panel 4/16/2024: Cholesterol 183, triglycerides 94, HDL 33,   --Patient is on fenofibrate which does lower triglycerides but does not lower the LDL.  --Patient was on simvastatin in the past and said that it was causing him to lose muscle mass.  In the chart it says he had myalgias.  He does not quite remember what transpired when he was on the simvastatin but I am concerned that maybe he had rhabdomyolysis and statins need to be avoided.  --Will begin ezetimibe 10 mg daily although I doubt that it will be strong enough to bring his LDL from 140 to a goal of 70.  -- As an outpatient would consider trying to get approval for Nexletol (Bempedoic Acid) 180 mg daily.  If he cannot get it approved to  get it in the [place], he can buy 100 tablets from [facility] $139 ($42/month)  Hypertension  Blood pressure today 143 to 172 mmHg systolic  On amlodipine 10 mg daily  On losartan 100 mg daily  Begin hydrochlorothiazide 12.5 mg daily in a.m.  Begin Klor-Con 20 mEq daily in a.m.  Chronic kidney disease, stage II (mild)  Lab Results   Component Value Date    EGFR 86 04/13/2025    EGFR 80 04/12/2025    EGFR 77 04/11/2025    CREATININE 0.89 04/13/2025    CREATININE 0.95 04/12/2025    CREATININE 0.98 04/11/2025     Long term (current) use of systemic steroids    Type 2 diabetes mellitus with diabetic polyneuropathy, with long-term current use of insulin (HCC)  Lab Results   Component Value Date    HGBA1C 6.6 (A) 01/29/2025       Recent Labs     04/12/25  1552 04/12/25  2058 04/13/25  0741 04/13/25  1121   POCGLU 182* 108 81 121       Blood Sugar Average: Last 72 hrs:  (P) [id number]        History of Present Illness   Cayetano Lucero is a 69 y.o. male who presents with multifocal pneumonia.  However he has shortness of breath which has become progressively worse over at least the last year.  He feels that in the past year his exercise tolerance has dropped approximately 30%.  He also has developed a chronic cough.  He has a long tobacco use history quitting approximately 10 years ago.  He smoked cigarettes for 20+ years and at the end was smoking 2 packs daily.  After smoking cigarettes he went to small cigars and smoked approximately 6 daily and inhaled them.  After quitting, he never went back to smoking.  He has been diagnosed with emphysema and is prone to infections due to CLL even though it is in remission.  This year he has had influenza, acute bronchitis and now a multifocal pneumonia.  He stated that in the last day he walked around the fifth floor and when he was [facility] back he started to puff and had to rest.  He states that a year ago, he would not have had to rest.  Echocardiograms discussed  above under coronary artery disease.  Patient's BNP is 118 which although the normal is less than 100, it is normal for somebody 69 years old and certainly not diagnostic of heart failure.    Plan:  Do not see evidence that there is a cardiac cause for patient's progressive dyspnea on exertion.  Most likely COPD from previous chronic tobacco use, quitting about 10 years ago, along with recurrent pulmonary infections which patient is susceptible to secondary to both COPD and CLL even though the CLL is under control, is most likely cause of patient's progressive dyspnea  Patient has his personal pulmonologist who referred him to the hospital when he thought he was acutely ill.  Recommend after discharge he follow-up with his pulmonologist for further evaluation and treatment as needed  For patient with coronary artery disease and a prior myocardial infarction, his LDL is too high and should be in the range of 50-55 since patient is a diabetic  Patient has an allergy to statins.  The chart says he had myalgias.  Patient says that he was losing muscle mass and that is why the statins were discontinued.  He does not remember muscle pain but states that it was 30 years ago and he could have forgotten that.  Concerned with both the charts diagnosis of myalgias and patient's statement regarding loss of muscle mass that he may have had rhabdomyolysis and feels statin should be avoided.  At present I would substitute ezetimibe for fenofibrate but do not feel that ezetimibe is potent enough to bring his LDL to goal  Would recommend that patient explore obtaining Nexletol (Bempedoic Acid) 180 mg daily) as an outpatient.  Review section under hyperlipidemia.  Hypertension not well-controlled  Hydrochlorothiazide 12.5 mg daily  K. Dur 20 mEq daily  Patient sees Dr. Ba in [place] for his cardiologist.  He is presently only working 1 day a week and probably going to retire soon.  I gave patient my card which has our office  number on to call when he wishes to switch to a new cardiologist.    PROBLEM LIST:    Patient Active Problem List    Diagnosis Date Noted    Dyspnea on exertion 04/11/2025    Pulmonary nodule 04/10/2025    Sepsis without acute organ dysfunction ([facility]) 04/09/2025    Left lower lobe pulmonary infiltrate 04/09/2025    S/P repair of paraesophageal hernia 07/18/2024    Low serum IgG for age 04/10/2024    Drug-induced osteoporosis 04/10/2024    Port-A-Cath in place 03/26/2024    Type 2 diabetes mellitus with diabetic polyneuropathy, with long-term current use of insulin ([facility]) 01/09/2024    Preop cardiovascular exam 12/11/2023    Cancer related pain 03/03/2023    Long term (current) use of systemic steroids 04/25/2022    Multifocal pneumonia 04/12/2022    Depression, recurrent ([facility]) 03/15/2021    Chronic obstructive pulmonary disease ([facility]) 08/11/2020    Autoimmune hemolytic anemia ([facility]) 06/19/2019    Right upper quadrant abdominal pain 06/19/2019    Hypogammaglobulinemia, acquired ([facility]) 04/10/2019    Acute bursitis of right shoulder 11/16/2018    Esophageal dysphagia 10/11/2018    Esophageal stricture 06/12/2018    Dysphagia 06/12/2018    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    Chronic right shoulder pain 05/08/2018    Type 2 diabetes mellitus with hyperglycemia, with long-term current use of insulin ([facility]) 04/27/2018    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    Colitis, acute 04/24/2018    Gastroesophageal reflux disease without esophagitis 01/31/2018    Headache around the eyes 10/30/2017    Pancytopenia ([facility]) 10/28/2017    Cellulitis of head or scalp 10/08/2017    Leukopenia due to antineoplastic chemotherapy ([facility]) 09/18/2017    Hyperglycemia 09/18/2017    Ulcer of esophagus without bleeding 09/11/2017    Chronic kidney disease, stage II (mild) 08/21/2017    Anemia, chronic disease 03/10/2017    Hemolytic anemia ([facility]) 03/06/2017    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[facility]) 03/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    H/O blood clots     Candida  esophagitis (HCC) 01/18/2017    CLL (chronic lymphocytic leukemia) (HCC)     Hyperlipidemia     Hypertension     History of TIA (transient ischemic attack)     Esophagitis, reflux 04/16/2014    Thrombocytopenia (HCC) 05/01/2013    Chronic lymphocytic leukemia (HCC) 04/18/2013    CAD (coronary artery disease) 01/01/2004       PREVIOUS WEIGHTS:   Weight (last 2 days)       Date/Time Weight    04/11/25 1400 94.3 (208)            Wt Readings from Last 2 Encounters:   04/11/25 94.3 kg (208 lb)   04/09/25 94.8 kg (209 lb)         Intake/Output Summary (Last 24 hours) at 4/13/2025 1430  Last data filed at 4/13/2025 1134  Gross per 24 hour   Intake 1098 ml   Output --   Net 1098 ml       Labs/Data:        Results from last 7 days   Lab Units 04/13/25 0510 04/12/25 0622 04/11/25  0614   WBC Thousand/uL 3.93* 4.53 4.10*   HEMOGLOBIN g/dL 12.8 12.7 12.1   HEMATOCRIT % 39.4 38.1 37.2   MCV fL 93 91 92   MCH pg 30.1 30.2 29.8   MCHC g/dL 32.5 33.3 32.5   PLATELETS Thousands/uL 237 263 243     Results from last 7 days   Lab Units 04/13/25  0844 04/13/25  0510 04/12/25  0622 04/11/25  0614   EGFR ml/min/1.73sq m  --  86 80 77   SODIUM mmol/L  --  140 141 141   POTASSIUM mmol/L 3.9 5.4* 4.6 3.4*   CHLORIDE mmol/L  --  109* 109* 109*   CO2 mmol/L  --  25 27 26   BUN mg/dL  --  15 18 22   CREATININE mg/dL  --  0.89 0.95 0.98     Results from last 7 days   Lab Units 04/13/25  0510 04/12/25  0622 04/11/25  0614 04/10/25  0435 04/09/25  1536   CALCIUM mg/dL 9.3 9.4 8.9 9.0 9.5   AST U/L  --   --   --  20 25   ALT U/L  --   --   --  13 15   ALK PHOS U/L  --   --   --  34 37   MAGNESIUM mg/dL  --  1.9 1.9 2.3  --      Lab Results   Component Value Date    CHOLESTEROL 183 04/16/2024    TRIG 94 04/16/2024    HDL 33 (L) 04/16/2024    LDLCALC 131 (H) 04/16/2024    HGBA1C 6.6 (A) 01/29/2025     Lab Results   Component Value Date    FREET4 0.74 08/08/2023    FREET4 0.84 01/10/2023     Results from last 7 days   Lab Units 04/09/25  1536   INR   1.34*   PROTIME seconds 16.7*        Current Facility-Administered Medications:     acetaminophen (TYLENOL) tablet 650 mg, 650 mg, Oral, Q6H PRN, Farzaneh Johnson MD, 650 mg at 04/11/25 1053    acyclovir (ZOVIRAX) tablet 400 mg, 400 mg, Oral, BID, Farzaneh Johnson MD, 400 mg at 04/13/25 0907    albuterol (PROVENTIL HFA,VENTOLIN HFA) inhaler 2 puff, 2 puff, Inhalation, Q4H PRN, Farzaneh Johnson MD    amLODIPine (NORVASC) tablet 10 mg, 10 mg, Oral, Daily, Farzaneh Johnson MD, 10 mg at 04/13/25 0906    aspirin (ECOTRIN LOW STRENGTH) EC tablet 81 mg, 81 mg, Oral, Daily, Farzaneh Johnson MD, 81 mg at 04/13/25 0906    benzonatate (TESSALON PERLES) capsule 200 mg, 200 mg, Oral, Q8H, Christina Johnson PA-C, 200 mg at 04/13/25 0514    cefTRIAXone (ROCEPHIN) 1,000 mg in dextrose 5 % 50 mL IVPB, 1,000 mg, Intravenous, Q24H, Farzaneh Johnson MD, Last Rate: 100 mL/hr at 04/12/25 1545, 1,000 mg at 04/12/25 1545    citalopram (CeleXA) tablet 40 mg, 40 mg, Oral, Daily, Farzaneh Johnson MD, 40 mg at 04/13/25 0906    doxycycline hyclate (VIBRAMYCIN) capsule 100 mg, 100 mg, Oral, Q12H OTONIEL, Christina Johnson PA-C, 100 mg at 04/13/25 0906    ezetimibe (ZETIA) tablet 10 mg, 10 mg, Oral, HS, Gulshan Wolfe MD    fluticasone-vilanterol 200-25 mcg/actuation 1 puff, 1 puff, Inhalation, Daily, Farzaneh Johnson MD, 1 puff at 04/13/25 0907    folic acid (FOLVITE) tablet 800 mcg, 800 mcg, Oral, Daily, Farzaneh Johnson MD, 800 mcg at 04/13/25 0906    gabapentin (NEURONTIN) capsule 600 mg, 600 mg, Oral, Daily, Farzaneh Johnson MD, 600 mg at 04/13/25 0906    [START ON 4/14/2025] hydroCHLOROthiazide tablet 12.5 mg, 12.5 mg, Oral, Daily, Gulshan Wolfe MD    Ibrutinib TABS 140 mg, 140 mg, Oral, Daily, Farzaneh Johnson MD, 140  mg at 04/13/25 0907    insulin degludec (TRESIBA) 100 units/mL injection pen 18 Units, 18 Units, Subcutaneous, HS, Christina Johnson PA-C, 18 Units at 04/10/25 2135    insulin lispro (HumaLOG) 100 units/mL subcutaneous injection pen (1-unit dial) 12 Units, 12 Units, Subcutaneous, TID AC, Christina Johnson PA-C, 12 Units at 04/12/25 1738    ipratropium-albuterol (DUO-NEB) 0.5-2.5 mg/3 mL inhalation solution 3 mL, 3 mL, Nebulization, TID, Mayelin Reese DO, 3 mL at 04/13/25 0738    levalbuterol (XOPENEX) inhalation solution 1.25 mg, 1.25 mg, Nebulization, Q6H PRN, Mayelin Reese DO    LORazepam (ATIVAN) tablet 0.5 mg, 0.5 mg, Oral, Daily PRN, Farzaneh Johnson MD    losartan (COZAAR) tablet 100 mg, 100 mg, Oral, Daily, Farzaneh Johnson MD, 100 mg at 04/13/25 0905    multivitamin stress formula tablet 1 tablet, 1 tablet, Oral, Daily, Farzaneh Johnson MD, 1 tablet at 04/13/25 0906    ondansetron (ZOFRAN) injection 4 mg, 4 mg, Intravenous, Q6H PRN, Farzaneh Johnson MD    oxyCODONE (ROXICODONE) immediate release tablet 10 mg, 10 mg, Oral, Q6H PRN, Farzaneh Johnson MD    pantoprazole (PROTONIX) EC tablet 40 mg, 40 mg, Oral, Daily Before Breakfast, Farzaneh Johnson MD, 40 mg at 04/13/25 0514    [START ON 4/14/2025] potassium chloride (Klor-Con M20) CR tablet 20 mEq, 20 mEq, Oral, Daily, Gulshan Wolfe MD    predniSONE tablet 5 mg, 5 mg, Oral, Daily, Farzaneh Johnson MD, 5 mg at 04/13/25 0906  Allergies   Allergen Reactions    Heparin Other (See Comments)     Other reaction(s): Blood Disorders  clot    Macrolides And Ketolides Other (See Comments)     Interacts with other meds he is taking    Simvastatin Myalgia       Invasive Devices       Central Venous Catheter Line  Duration             Port A Cath 01/01/10 Right Chest 5581 days              Peripheral Intravenous Line  Duration  "            Peripheral IV 04/13/25 Right Antecubital <1 day                    Review of Systems   Constitutional: Negative.    HENT: Negative.     Eyes: Negative.    Respiratory:  Positive for cough and shortness of breath. Negative for chest tightness and wheezing.    Cardiovascular:  Negative for chest pain, palpitations and leg swelling.   Gastrointestinal: Negative.    Endocrine: Negative.    Musculoskeletal: Negative.    Skin: Negative.    Allergic/Immunologic: Negative.    Neurological: Negative.    Hematological: Negative.    Psychiatric/Behavioral: Negative.         Vitals: /96   Pulse 104   Temp (!) 97.3 °F (36.3 °C) (Oral)   Resp 18   Ht 6' 1\" (1.854 m)   Wt 94.3 kg (208 lb)   SpO2 93%   BMI 27.44 kg/m²     Physical Exam  Vitals reviewed.   Constitutional:       General: He is not in acute distress.     Appearance: He is well-developed.   HENT:      Head: Normocephalic and atraumatic.   Neck:      Thyroid: No thyromegaly.      Vascular: No carotid bruit or JVD.      Trachea: No tracheal deviation.   Cardiovascular:      Rate and Rhythm: Normal rate and regular rhythm.      Pulses: Normal pulses.      Heart sounds: Normal heart sounds. No murmur heard.     No friction rub. No gallop.   Pulmonary:      Effort: Pulmonary effort is normal. No respiratory distress.      Breath sounds: Normal breath sounds. No wheezing, rhonchi or rales.      Comments: Prominent cough  Chest:      Chest wall: No tenderness.   Abdominal:      General: Bowel sounds are normal. There is no distension.      Palpations: Abdomen is soft.      Tenderness: There is no abdominal tenderness.   Musculoskeletal:      Cervical back: Normal range of motion and neck supple.      Right lower leg: No edema.      Left lower leg: No edema.   Skin:     General: Skin is warm and dry.   Neurological:      General: No focal deficit present.      Mental Status: He is alert and oriented to person, place, and time.      Gait: Gait normal. " "  Psychiatric:         Mood and Affect: Mood normal.         Behavior: Behavior normal.         Thought Content: Thought content normal.         Judgment: Judgment normal.       EKG: Sinus tachycardia.  Left axis deviation.  Old inferior wall myocardial infarction.  Abnormal ECG    Portions of the record may have been created with voice recognition software. Occasional wrong word or \"sound a like\" substitutions may have occurred due to the inherent limitations of voice recognition software. Read the chart carefully and recognize, using context, where substitutions have occurred.    ======================================================        Imaging Results Review: I reviewed radiology reports from this admission including: chest xray.  Other Study Results Review: EKG was reviewed.     "

## 2025-04-13 NOTE — DISCHARGE INSTR - AVS FIRST PAGE
Cayetano,     You were sent home with 2 antibiotics, cefuroxime and doxycycline. We started you on these in the hospital    Start your cefuroxime today.  You will take this twice a day, 12 hours apart. Complete the course (7 days total). You are due for your dose today.  Doxycycline, you will take one pill tonight and 1 tomorrow morning.  To complete a 5-day course    Other new medications include hydrochlorothiazide with potassium supplementation and Zetia instead of your fenofibrate    Recommend that your family doctor checks some blood work to monitor your kidney function and electrolytes on this newly added hydrochlorothiazide.  Would recommend attempting a family doctor follow-up in 1 to 2 weeks to review your hospital stay and new medications.    Remember you had a pulmonary nodule that was found to me recommend a CT of your chest in 6 to 12 months.  Please follow-up with your pulmonologist    Reminder that you had some lower esophageal dilation on your scan.  Discussed with Dr. Gonsales in the outpatient setting due to some difficulty swallowing intermittently that you expressed.  Continue your acid reflux medication    Recommend you follow-up with the outpatient cardiologist and they can keep an eye on your cholesterol.  We gave you the number for the [place] office on your discharge information.    For your cough you can take Tessalon Perles as needed up to 3 times a day.  You have been getting these around-the-clock here to help keep your cough at bay    Recommend you touch base with your oncologist to discuss when you can resume treatments.

## 2025-04-13 NOTE — DISCHARGE SUMMARY
Discharge Summary - Hospitalist   Name: Cayetano Lucero 69 y.o. male I MRN: [id number]  Unit/Bed#: E5 -01 I Date of Admission: 4/9/2025   Date of Service: 4/13/2025 I Hospital Day: 3     Assessment & Plan  Multifocal pneumonia  Presented with progressive cough, fevers/chills, decreased appetite  Was diagnosed with influenza approximately 1 month ago  Viral panel on admission negative  Met septic criteria on admission, CT chest with multifocal pneumonia  Sputum culture positive for H influenza  Received 4 days IV Rocephin, transition over to oral cefuroxime to complete 7-day course  Received 4 days doxycycline, continue for 1 additional day to complete 5 day course for atypical coverage in setting of immunosuppression  Continue antitussives on discharge as needed  Ambulated without hypoxia  Follow-up outpatient PCP and pulmonology  Sepsis without acute organ dysfunction (HCC)  Evident by tachycardia, tachypnea, leukocytosis, source pneumonia  Antibiotics as stated above  Blood cultures negative at 72 hours  Improving  Dyspnea on exertion  Suspect this could be multifactorial in setting of underlying COPD with multifocal pneumonia  No exertional or resting chest pain  No SOB at rest  No clinical signs or symptoms of heart failure  Last echocardiogram with EF 60% in 2018  Echocardiogram here with slightly decreased ejection fraction 52%, abnormal septal motion indeterminate diastolic dysfunction  Given remote history of stent and progressive CARBALLO cardiology evaluated.  Cardiologist reviewed echocardiogram and septal wall motion was questionable, feel patient's CARBALLO is cardiac related.  Can follow-up in the outpatient setting with pulmonology and cardiology  CAD (coronary artery disease)  Remote history in 2004, has 1 stent. Continue aspirin, fenofibrate changed to Zetia  Chronic obstructive pulmonary disease (HCC)  With outpatient diagnosis of moderate persistent asthma bronchitis versus COPD  PFTs in 2021 with  normal spirometry no bronchodilator response  Continue home inhaler, PTA prednisone  CLL (chronic lymphocytic leukemia) (HCC)  Known to Dr. Mejia, patient on ibrutinib and prednisone daily  Also receiving IVIG and Gazyva every 4 weeks  Hematology consulted here, messaged patient's outpatient oncology team   Continue outpatient oncology follow-up  Hypogammaglobulinemia, acquired (HCC)  Receives IVIG as an outpatient  IgG levels here 318  Status post IVIG infusion 4/11/2025  Hyperlipidemia  Lipid panel on April 2024 with   Patient was on simvastatin in the past, unclear if there was a concern of rhabdomyolysis  Cardiology transition fenofibrate over to Zetia  Outpatient follow-up with cardiology, consider trying to get approved for Nexletol  Hypertension  With suboptimally controlled blood pressure  Continue amlodipine 10 mg daily, losartan 100 mg daily  Cardiology starting HCTZ 12.5 mg daily with potassium supplement 20 mEq daily  Patient has been on labetalol in the past but had poor side effects  Defer beta-blocker at this time due to underlying COPD  New medications discussed with cardiology and patient bedside.  New prescription sent  Chronic kidney disease, stage II (mild)  Lab Results   Component Value Date    EGFR 86 04/13/2025    EGFR 80 04/12/2025    EGFR 77 04/11/2025    CREATININE 0.89 04/13/2025    CREATININE 0.95 04/12/2025    CREATININE 0.98 04/11/2025   Creatinine at baseline  Recommend BMP in 1 to 2 weeks with PCP due to newly added HCTZ, discussed with patient who expressed understanding  Gastroesophageal reflux disease without esophagitis  Patient did undergo paraesophageal hernia repair 6/7/2024  Tolerating diet without concerns, although does admit to a few episodes of intermittent dysphagia  He is known to Dr. Gonsales, reviewed office visits and post op follow ups  Of note his CT chest does display persistent distal esophageal thickening which I discussed with him  Continue  PPI  Recommend discussing with Dr. 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)  Noted, patient is not on heparin products  Long term (current) use of systemic steroids  PTA prednisone 5 mg daily  Type 2 diabetes mellitus with diabetic polyneuropathy, with long-term current use of insulin (HCC)  Lab Results   Component Value Date    HGBA1C 6.6 (A) 01/29/2025     Recent Labs     04/12/25  1552 04/12/25  2058 04/13/25  0741 04/13/25  1121   POCGLU 182* 108 81 121     PTA glargine 18 un at bedtime, 12 units with meals  Continue home CGM monitoring  Pulmonary nodule  6 mm left upper lobe groundglass nodule noted on CT  Discussed with patient malignancy cannot be ruled out, he expressed understanding  Will need follow up non-contrast in 6-12 months to ensure stability, then additional follow up every 2 years until 5 year stability is demonstrated  Incidental finding note completed     Medical Problems       Resolved Problems  Date Reviewed: 11/4/2024   None       Discharging Physician / Practitioner: Christina Johnson PA-C  PCP: Meseret Elliott DO  Admission Date:   Admission Orders (From admission, onward)       Ordered        04/10/25 1451  INPATIENT ADMISSION  Once            04/09/25 1700  Place in Observation  Once                          Discharge Date: 04/13/25    Consultations During Hospital Stay:  Cardiology    Procedures Performed:   Ambulatory pulse oximetry test 4/12/2025    Significant Findings / Test Results:   Echo complete w/ contrast if indicated  Result Date: 4/12/2025  Narrative: Technically difficult transthoracic echocardiogram study. Mild to moderate concentric left ventricular hypertrophy, abnormal septal motion, low normal left ventricular systolic function.  Ejection fraction is estimated as around 52%.  Indeterminate diastolic function. Borderline dilated right ventricle cavity, normal right ventricular  systolic function. Top normal left atrial cavity size.  Normal right atrial cavity size. Mild aortic valve sclerosis, no arctic valve stenosis or regurgitation. Mitral valve leaflet sclerosis, mild mitral valve regurgitation. Trace tricuspid and pulmonic valve regurgitation. No obvious pulmonary hypertension. No pericardial effusion. Compared to report of previous echocardiogram from 4/27/2018 left ventricular ejection fraction is slightly decreased.  Previous reported EF was around 60%.  Abnormal septal motion was not noted previously.     CT chest wo contrast  Result Date: 4/10/2025  Impression: Multifocal pneumonia. 6 mm left upper lobe groundglass nodule. Based on current Fleischner Society 2017 Guidelines on incidental pulmonary nodule, follow-up noncontrast CT is recommended at 6-12 months from the initial examination to confirm persistence; if stable at that time, additional follow-up CT is recommended for every 2 years until 5 years of stability is demonstrated. Persistent distal esophageal thickening. The study was marked in EPIC for significant notification.     XR chest 2 views  Result Date: 4/9/2025  Impression: Small left lower lobe airspace opacities more likely infiltrate than atelectasis and should be correlated clinically for evidence of infection. Follow-up is suggested in 6 to 8 weeks time to ensure resolution.     Incidental Findings:   6 mm left upper lobe groundglass nodule. Based on current Fleischner Society 2017 Guidelines on incidental pulmonary nodule, follow-up noncontrast CT is recommended at 6-12 months from the initial examination to confirm persistence; if stable at that time, additional follow-up CT is recommended for every 2 years until 5 years of stability is demonstrated.     I reviewed the above mentioned incidental findings with the patient and/or family and they expressed understanding.    Test Results Pending at Discharge (will require follow up):   Repeat CT chest in 6 to 12  months as per the recommendations above     Outpatient Tests Requested:  Recommend BMP as an outpatient with PCP in 1 to 2 weeks due to newly added HCTZ  Rest of testing defer to outpatient providers    Complications:  None    Reason for Admission: Multifocal pneumonia    Hospital Course:   Cayetano Lucero is a 69 y.o. male patient who originally presented to the hospital on 4/9/2025 due to progressive cough, fevers chills and decreased appetite.  Patient met septic criteria and was diagnosed with multifocal pneumonia.  Patient was started on IV Rocephin and doxycycline and had significant improvement in cough and appetite.  Patient sputum culture confirmed H influenza.  Patient had suppressed IgG levels and underwent IVIG infusion with hematology during his stay.  Due to progressive CARBALLO patient had echocardiogram and evaluation by cardiology.  Repeat echocardiogram was with preserved ejection fraction and cardiology did not feel symptoms are cardiac related.  Patient did have medication adjustments with blood pressure and cholesterol as stated above and will be recommended to follow-up in the outpatient with cardiology.  Incidental finding as noted above discussed with both patient and wife, patient will need repeat follow-up in the outpatient with pulmonology for repeat chest imaging and pulmonary nodule finding.  Patient was encouraged to follow with PCP in approximately 1 week to review hospital stay and check blood work on newly added hydrochlorothiazide and potassium supplementation.  Patient was feeling well on discharge and will be discharged home.    Please see above list of diagnoses and related plan for additional information.     Condition at Discharge: good    Discharge Day Visit / Exam:   * Please refer to separate progress note for these details *    Discharge instructions/Information to patient and family:   See after visit summary for information provided to patient and family.      Provisions for  Follow-Up Care:  See after visit summary for information related to follow-up care and any pertinent home health orders.      Mobility at time of Discharge:   Basic Mobility Inpatient Raw Score: 24  JH-HLM Goal: 8: Walk 250 feet or more  JH-HLM Achieved: 8: Walk 250 feet ot more  HLM Goal achieved. Continue to encourage appropriate mobility.     Disposition:   Home    Planned Readmission: No    Discharge Medications:  See after visit summary for reconciled discharge medications provided to patient and/or family.      Administrative Statements   Discharge Statement:  I have spent a total time of 30 minutes in caring for this patient on the day of the visit/encounter.     **Please Note: This note may have been constructed using a voice recognition system**

## 2025-04-13 NOTE — ASSESSMENT & PLAN NOTE
Blood pressure today 143 to 172 mmHg systolic  On amlodipine 10 mg daily  On losartan 100 mg daily  Begin hydrochlorothiazide 12.5 mg daily in a.m.  Begin Klor-Con 20 mEq daily in a.m.

## 2025-04-13 NOTE — ASSESSMENT & PLAN NOTE
Lab Results   Component Value Date    HGBA1C 6.6 (A) 01/29/2025     Recent Labs     04/12/25  1552 04/12/25 2058 04/13/25  0741 04/13/25  1121   POCGLU 182* 108 81 121     PTA glargine 18 un at bedtime, 12 units with meals  Continue home CGM monitoring

## 2025-04-13 NOTE — ASSESSMENT & PLAN NOTE
Patient states over the last year, maybe longer he has become more short of breath.  He feels that in the past year his exercise tolerance has dropped approximately 30%.  He also has developed a chronic cough.  He has a long tobacco use history quitting approximately 10 years ago.  He smoked cigarettes for 20+ years and at the end was smoking 2 packs daily.  After smoking cigarettes he went to small cigars and smoked approximately 6 daily and inhaled them.  After quitting, he never went back to smoking.  He has been diagnosed with emphysema and is prone to infections due to CLL even though it is in remission.  This year he has had influenza, acute bronchitis and now a multifocal pneumonia.  He stated that in the last day he walked around the fifth floor and when he was alf back he started to puff and had to rest.  He states that a year ago, he would not have had to rest.

## 2025-04-13 NOTE — ASSESSMENT & PLAN NOTE
Lipid panel on April 2024 with   Patient was on simvastatin in the past, unclear if there was a concern of rhabdomyolysis  Cardiology transition fenofibrate over to Zetia  Outpatient follow-up with cardiology, consider trying to get approved for Nexletol

## 2025-04-13 NOTE — ASSESSMENT & PLAN NOTE
Lab Results   Component Value Date    EGFR 86 04/13/2025    EGFR 80 04/12/2025    EGFR 77 04/11/2025    CREATININE 0.89 04/13/2025    CREATININE 0.95 04/12/2025    CREATININE 0.98 04/11/2025   Creatinine at baseline  Recommend BMP in 1 to 2 weeks with PCP due to newly added HCTZ, discussed with patient who expressed understanding

## 2025-04-13 NOTE — ASSESSMENT & PLAN NOTE
Suspect this could be multifactorial in setting of underlying COPD with multifocal pneumonia although patient notes this has been ongoing for months prior to influenza diagnosis  No exertional or resting chest pain  No SOB at rest  No clinical signs or symptoms of heart failure  Last echocardiogram with EF 60% in 2018  Echocardiogram here with slightly decreased ejection fraction 52%, abnormal septal motion indeterminate diastolic dysfunction  Given remote history of stent and progressive CARBALLO appreciate cardiology evaluation to comment if patient would benefit from further inpatient work up vs outpatient follow up

## 2025-04-13 NOTE — ASSESSMENT & PLAN NOTE
Known to Dr. Mejia, patient on ibrutinib and prednisone daily  Also receiving IVIG and Gazyva every 4 weeks  Hematology consulted here, messaged patient's outpatient oncology team   Continue outpatient oncology follow-up

## 2025-04-13 NOTE — ASSESSMENT & PLAN NOTE
Suspect this could be multifactorial in setting of underlying COPD with multifocal pneumonia  No exertional or resting chest pain  No SOB at rest  No clinical signs or symptoms of heart failure  Last echocardiogram with EF 60% in 2018  Echocardiogram here with slightly decreased ejection fraction 52%, abnormal septal motion indeterminate diastolic dysfunction  Given remote history of stent and progressive CARBALLO cardiology evaluated.  Cardiologist reviewed echocardiogram and septal wall motion was questionable, feel patient's CARBALLO is cardiac related.  Can follow-up in the outpatient setting with pulmonology and cardiology

## 2025-04-13 NOTE — ASSESSMENT & PLAN NOTE
Presented with progressive cough, fevers/chills, decreased appetite  Was diagnosed with influenza approximately 1 month ago  Viral panel on admission negative  Met septic criteria on admission, CT chest with multifocal pneumonia  Sputum culture positive for H influenza  Received 4 days IV Rocephin, transition over to oral cefuroxime to complete 7-day course  Received 4 days doxycycline, continue for 1 additional day to complete 5 day course for atypical coverage in setting of immunosuppression  Continue antitussives on discharge as needed  Ambulated without hypoxia  Follow-up outpatient PCP and pulmonology

## 2025-04-13 NOTE — ASSESSMENT & PLAN NOTE
With outpatient diagnosis of moderate persistent asthma bronchitis versus COPD  PFTs in 2021 with normal spirometry no bronchodilator response  Continue home inhaler, PTA prednisone

## 2025-04-13 NOTE — ASSESSMENT & PLAN NOTE
With suboptimally controlled blood pressure  Continue amlodipine 10 mg daily, losartan 100 mg daily  Cardiology starting HCTZ 12.5 mg daily with potassium supplement 20 mEq daily  Patient has been on labetalol in the past but had poor side effects  Defer beta-blocker at this time due to underlying COPD  New medications discussed with cardiology and patient bedside.  New prescription sent

## 2025-04-14 ENCOUNTER — TRANSITIONAL CARE MANAGEMENT (OUTPATIENT)
Dept: FAMILY MEDICINE CLINIC | Facility: CLINIC | Age: 70
End: 2025-04-14

## 2025-04-14 ENCOUNTER — TELEPHONE (OUTPATIENT)
Age: 70
End: 2025-04-14

## 2025-04-14 ENCOUNTER — TELEPHONE (OUTPATIENT)
Dept: FAMILY MEDICINE CLINIC | Facility: CLINIC | Age: 70
End: 2025-04-14

## 2025-04-14 DIAGNOSIS — C91.10 CLL (CHRONIC LYMPHOCYTIC LEUKEMIA) (HCC): Primary | ICD-10-CM

## 2025-04-14 LAB
BACTERIA BLD CULT: NORMAL
BACTERIA BLD CULT: NORMAL

## 2025-04-14 NOTE — TELEPHONE ENCOUNTER
Pt is unsure if Dr. Mejia would like him to still go to infusion tomorrow. Pt stated he was recently hospitalized and he did receive some chemo there. He wasn't sure it was too soon to do treatment or if he should skip? Pt would like a call back at [phone]. Thank you.

## 2025-04-14 NOTE — TELEPHONE ENCOUNTER
"We have received a referral from the hospital for this patient for Pneumonia and Dyspnea on exertion. Patient was not seen on admission by our office. The discharge notes advise patient to \"Call Sravan Austin DO (Pulmonary Disease) in 2 weeks (4/27/2025).\" Patient was last seen by  on 4/9/25. There are no openings with this Doctor for me to call patient and schedule him a follow up visit. Please advise.  "

## 2025-04-14 NOTE — TELEPHONE ENCOUNTER
Please schedule Cayetano for inpatient follow up with any of the pulmonary fellows in 2-4 weeks since I will be either booked or in ICU at [facility], and he can follow up with me afterwards!    Thank you,  Sravan

## 2025-04-14 NOTE — TELEPHONE ENCOUNTER
Pt called to schedule a TCM appt.    Hospital: [facility]  Admission Date: 4/9/2025  Discharge Date: 4/13/2025    Warm transferred to [facility] with clerical to assist with TCM scheduling.

## 2025-04-14 NOTE — TELEPHONE ENCOUNTER
Pt called to schedule a TCM appointment with Dr. Elliott. Pt was admitted on 4/9/25 and d/c 4/13/25 from [facility] for pneumonia. Offered soonest appointment, but pt only wanted to see Dr. Elliott. Scheduled pt for soonest appointment with Dr. Elliott. Pt scheduled 4/28/25 with Dr. Elliott.

## 2025-04-15 ENCOUNTER — HOSPITAL ENCOUNTER (OUTPATIENT)
Dept: INFUSION CENTER | Facility: CLINIC | Age: 70
Discharge: HOME | End: 2025-04-15

## 2025-04-15 ENCOUNTER — TELEPHONE (OUTPATIENT)
Dept: ENDOCRINOLOGY | Facility: CLINIC | Age: 70
End: 2025-04-15

## 2025-04-15 DIAGNOSIS — E11.9 TYPE 2 DIABETES MELLITUS WITHOUT COMPLICATION, WITH LONG-TERM CURRENT USE OF INSULIN (HCC): ICD-10-CM

## 2025-04-15 DIAGNOSIS — Z79.4 TYPE 2 DIABETES MELLITUS WITHOUT COMPLICATION, WITH LONG-TERM CURRENT USE OF INSULIN (HCC): ICD-10-CM

## 2025-04-15 NOTE — TELEPHONE ENCOUNTER
Patient called the RX Refill Line. Message is being forwarded to the office.     Patient is requesting an order for Continuous Glucose Sensor (FreeStyle Ashley 2 Sensor) be sent to [facility]     Please contact patient at [phone]

## 2025-04-16 ENCOUNTER — OFFICE VISIT (OUTPATIENT)
Dept: HEMATOLOGY ONCOLOGY | Facility: CLINIC | Age: 70
End: 2025-04-16
Payer: MEDICARE

## 2025-04-16 ENCOUNTER — OFFICE VISIT (OUTPATIENT)
Dept: FAMILY MEDICINE CLINIC | Facility: CLINIC | Age: 70
End: 2025-04-16
Payer: MEDICARE

## 2025-04-16 VITALS
DIASTOLIC BLOOD PRESSURE: 74 MMHG | HEART RATE: 100 BPM | HEIGHT: 73 IN | WEIGHT: 204.8 LBS | OXYGEN SATURATION: 98 % | BODY MASS INDEX: 27.14 KG/M2 | SYSTOLIC BLOOD PRESSURE: 136 MMHG | TEMPERATURE: 98.3 F

## 2025-04-16 VITALS
TEMPERATURE: 98.3 F | HEIGHT: 73 IN | OXYGEN SATURATION: 98 % | WEIGHT: 204 LBS | RESPIRATION RATE: 18 BRPM | BODY MASS INDEX: 27.04 KG/M2 | HEART RATE: 100 BPM | SYSTOLIC BLOOD PRESSURE: 136 MMHG | DIASTOLIC BLOOD PRESSURE: 74 MMHG

## 2025-04-16 DIAGNOSIS — I10 PRIMARY HYPERTENSION: ICD-10-CM

## 2025-04-16 DIAGNOSIS — D80.1 HYPOGAMMAGLOBULINEMIA, ACQUIRED (HCC): ICD-10-CM

## 2025-04-16 DIAGNOSIS — J44.9 CHRONIC OBSTRUCTIVE PULMONARY DISEASE, UNSPECIFIED COPD TYPE (HCC): ICD-10-CM

## 2025-04-16 DIAGNOSIS — E11.42 TYPE 2 DIABETES MELLITUS WITH DIABETIC POLYNEUROPATHY, WITH LONG-TERM CURRENT USE OF INSULIN (HCC): ICD-10-CM

## 2025-04-16 DIAGNOSIS — Z86.2 HISTORY OF AUTOIMMUNE HEMOLYTIC ANEMIA: ICD-10-CM

## 2025-04-16 DIAGNOSIS — Z79.4 TYPE 2 DIABETES MELLITUS WITH DIABETIC POLYNEUROPATHY, WITH LONG-TERM CURRENT USE OF INSULIN (HCC): ICD-10-CM

## 2025-04-16 DIAGNOSIS — E78.2 MIXED HYPERLIPIDEMIA: ICD-10-CM

## 2025-04-16 DIAGNOSIS — J42 CHRONIC BRONCHITIS, UNSPECIFIED CHRONIC BRONCHITIS TYPE (HCC): ICD-10-CM

## 2025-04-16 DIAGNOSIS — J18.9 MULTIFOCAL PNEUMONIA: Primary | ICD-10-CM

## 2025-04-16 DIAGNOSIS — C91.10 CLL (CHRONIC LYMPHOCYTIC LEUKEMIA) (HCC): Primary | ICD-10-CM

## 2025-04-16 DIAGNOSIS — I25.10 CORONARY ARTERY DISEASE INVOLVING NATIVE CORONARY ARTERY OF NATIVE HEART WITHOUT ANGINA PECTORIS: ICD-10-CM

## 2025-04-16 DIAGNOSIS — D75.829 HIT (HEPARIN-INDUCED THROMBOCYTOPENIA) (HCC): ICD-10-CM

## 2025-04-16 DIAGNOSIS — C91.10 CHRONIC LYMPHOCYTIC LEUKEMIA (HCC): ICD-10-CM

## 2025-04-16 DIAGNOSIS — R76.8 LOW SERUM IGG FOR AGE: ICD-10-CM

## 2025-04-16 DIAGNOSIS — N18.30 STAGE 3 CHRONIC KIDNEY DISEASE, UNSPECIFIED WHETHER STAGE 3A OR 3B CKD (HCC): ICD-10-CM

## 2025-04-16 DIAGNOSIS — J45.41 MODERATE PERSISTENT ASTHMATIC BRONCHITIS WITH ACUTE EXACERBATION: ICD-10-CM

## 2025-04-16 PROCEDURE — 99496 TRANSJ CARE MGMT HIGH F2F 7D: CPT | Performed by: FAMILY MEDICINE

## 2025-04-16 PROCEDURE — G2211 COMPLEX E/M VISIT ADD ON: HCPCS | Performed by: INTERNAL MEDICINE

## 2025-04-16 PROCEDURE — 99215 OFFICE O/P EST HI 40 MIN: CPT | Performed by: INTERNAL MEDICINE

## 2025-04-16 NOTE — ASSESSMENT & PLAN NOTE
Follows with PCP and was recently seen by lung specialist and was hospitalized with pneumonia

## 2025-04-16 NOTE — ASSESSMENT & PLAN NOTE
Blood pressure appears stable today.  Continue with routine home monitoring as well as current treatment with amlodipine prescribed as well as hydrochlorothiazide

## 2025-04-16 NOTE — PROGRESS NOTES
Name: Cayetano Lucero      : 1955      MRN: [id number]  Encounter Provider: Gideon Mejia MD  Encounter Date: 2025   Encounter department: [facility] [place].    Reviewed hospital records of recent admission with multifocal pneumonia  :  Assessment & Plan  CLL (chronic lymphocytic leukemia) (HCC)  Patient has been on ibrutinib 140 mg daily, IVIG every 4 weeks and Gazyva every 4 weeks but not at the same time, alternating.  Also is on prednisone 5 mg daily and folic acid because of previous history of autoimmune hemolytic anemia and prednisone also helps with his breathing.       Moderate persistent asthmatic bronchitis with acute exacerbation  Recent hospitalization because of worsening of respiratory status and was found to have pneumonia.       Chronic bronchitis, unspecified chronic bronchitis type ([facility])  Same as above       Hypogammaglobulinemia, acquired ([facility])  On IVIG therapy       Low serum IgG for age  On IVIG therapy       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[facility])  Patient has been aware of heparin allergy       Chronic obstructive pulmonary disease, unspecified COPD type ([facility])  Follows with PCP and was recently seen by lung specialist and was hospitalized with pneumonia       Type 2 diabetes mellitus with diabetic polyneuropathy, with long-term current use of insulin ([facility])    Lab Results   Component Value Date    HGBA1C 6.6 (A) 2025   Managed by PCP         Coronary artery disease involving native coronary artery of native heart without angina pectoris  Was seen by cardiologist       Primary hypertension  Managed by PCP       Stage 3 chronic kidney disease, unspecified whether stage 3a or 3b CKD ([facility])  Lab Results   Component Value Date    EGFR 86 2025    EGFR 80 2025    EGFR 77 2025    CREATININE 0.89 2025    CREATININE 0.95 2025    CREATININE 0.98 2025   Managed by PCP         History of autoimmune hemolytic  anemia  No active myelosis at present.  AIHA was secondary to CLL           Patient has standing orders for blood work, IVIG and Gazyva.  Follow-up in 3 months.  CLL remains in remission.  AIHA remains in remission.  No change in treatments for CLL and AIHA.  IVIG to lower risk of infections.  Goal is to manage CLL, AIHA and prevention of infections and management of other problems listed above by PCP and other consultants.  Patient is capable of self-care.  All discussed in detail.  Questions answered.  I suggested eating healthy foods, activities as tolerated but to avoid falls and trauma.  Suggested health screening tests. Patient to continue to follow-up with primary physician and other consultants.  Provided counseling and support.  I used a dictation device to dictate this note and there could be mistakes in my note and for that patient may contact my office.      History of Present Illness   Chief Complaint   Patient presents with    Follow-up   Follow-up visit for stage IV CLL, diagnosed 20+ years and had complications especially severe AIHA and had hospitalizations locally and at [facility].  Presently on 140 mg ibrutinib daily, IVIG every 4 weeks and Gazyva every 4 weeks.  Recent hospitalization with worsening of respiratory symptoms and pneumonia.  Has history of COPD with exacerbations.   In addition he is also on prednisone 5 mg daily, folic acid and acyclovir.  He has been taking vitamin D and calcium.  Bone density test has shown mild case of osteopenia.  CLL was diagnosed more than 20 years ago and he had multiple lines of therapies and at 1 time his hemoglobin was down to 4.9 because of  autoimmune hemolytic anemia and CLL.    CLL was diagnosed several years ago. He had been through Fludara based chemotherapy and pentostatin based chemotherapy. He had increased hemolysis when he was on chemotherapy. His most recent chemotherapy treatment was Cytoxan, Oncovin, prednisone and Rituxan and last treatment was  in December 2012.. In 2013 he was started on Rituxan, prednisone and folic acid because he started to hemolyse again. IVIG was tried unsuccessfully so far as hemolysis is concerned. Rituxan has controlled the hemolysis. ...  Information on the treatments from March 2017 onwards.  On 3/20/17 patient found to have hemoglobin of 6.2, ,000. He was admitted to [facility] for blood transfusion and plan to transfer to [facility].   On 3/20/17 WBC count 195,000, hemoglobin 4.9, platelet count 65. Direct coomb's was positive with undetectable haptoglobin. He was given 2 units of PRBCs, Solu-Medrol 1 g ×3 days and IVIG 1 g/kg daily ×3 days. His hemoglobin continued to drop. Bone marrow biopsy on 3/21 showed marrow cellularity of greater than 95% almost replaced by small lymphocytes, lambda light chain restricted, CD20 positive, CD19 positive, CD23 positive partially expressing CD11c and CD25 and CD5 positive and negative for CD 79 and CD38.  He was treated with single dose of chlorambucil to control his CLL.  3/22 second dose of chlorambucil, also Rituxan 375 mg/M ² autoimmune hemolytic anemia. WBC continued to rise, 266,000.  Patient was initiated with Venetoclax 20 mg daily. Tumor lysis monitored every 8 hours; given aggressive hydration and Lasix and remained euvolemic.  Later venetoclax was changed to ibrutinib because of disease progression  Dec 2017- Imbruvica decreased to 140 mg PO daily due to thrombocytopenia .  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 He was discharged on IV ampicillin. Echo negative for vegetations.   He is doing  well on present treatment plan so far as CLL is concerned  Patient had cataract surgeries in December 2023   He has tiredness, chronic nonproductive cough, exertional dyspnea, occasionally wheezing, arthritic symptoms and for that he is on oxycodone.  He has  vascular purpura on forearms.  He  has chronic lung disease, COPD.  History of diabetes mellitus, hypertension and coronary artery disease.  .  For dysphagia he had EGD and dilatations  and he follows with his gastroenterologist.   He  has history of heparin-induced thrombocytopenia and he knows that he is allergic to heparin.    He has coronary artery disease and has 1 stent and he takes 81 mg aspirin daily with food.    Oncology History   Cancer Staging   No matching staging information was found for the patient.  Oncology History   CLL (chronic lymphocytic leukemia) ([facility])   8/22/2017 -  Chemotherapy    chlorambucil (LEUKERAN), 0.5 mg/kg, 6 of 6 cycles  obinutuzumab (GAZYVA) day 1 IVPB, 100 mg, 1 of 1 cycle  obinutuzumab (GAZYVA) day 2 titrated infusion, 900 mg, 1 of 1 cycle  obinutuzumab (GAZYVA) subsequent titrated infusion, 1,000 mg, 79 of 86 cycles  Administration: 1,000 mg (5/21/2019), 1,000 mg (6/18/2019), 1,000 mg (7/16/2019), 1,000 mg (8/13/2019), 1,000 mg (9/10/2019), 1,000 mg (10/8/2019), 1,000 mg (11/5/2019), 1,000 mg (12/3/2019), 1,000 mg (12/31/2019), 1,000 mg (1/28/2020), 1,000 mg (2/25/2020), 1,000 mg (3/24/2020), 1,000 mg (4/21/2020), 1,000 mg (5/19/2020), 1,000 mg (6/16/2020), 1,000 mg (7/14/2020), 1,000 mg (8/11/2020), 1,000 mg (9/9/2020), 1,000 mg (10/6/2020), 1,000 mg (11/3/2020), 1,000 mg (12/1/2020), 1,000 mg (1/26/2021), 1,000 mg (12/29/2020), 1,000 mg (2/23/2021), 1,000 mg (3/23/2021), 1,000 mg (4/20/2021), 1,000 mg (5/18/2021), 1,000 mg (6/15/2021), 1,000 mg (7/13/2021), 1,000 mg (8/10/2021), 1,000 mg (9/7/2021), 1,000 mg (10/5/2021), 1,000 mg (11/2/2021), 1,000 mg (11/30/2021), 1,000 mg (12/28/2021), 1,000 mg (1/25/2022), 1,000 mg (2/22/2022), 1,000 mg (3/22/2022), 1,000 mg (5/17/2022), 1,000 mg (6/14/2022), 1,000 mg (7/12/2022), 1,000 mg (8/9/2022), 1,000 mg (9/6/2022), 1,000 mg (10/4/2022), 1,000 mg (11/1/2022), 1,000 mg (11/29/2022), 1,000 mg (1/24/2023), 1,000 mg (2/21/2023), 1,000 mg (3/21/2023), 1,000 mg (4/18/2023),  "1,000 mg (5/16/2023), 1,000 mg (6/13/2023), 1,000 mg (7/11/2023), 1,000 mg (8/8/2023), 1,000 mg (9/5/2023), 1,000 mg (10/3/2023), 1,000 mg (10/31/2023), 1,000 mg (11/28/2023), 1,000 mg (1/23/2024), 1,000 mg (2/20/2024), 1,000 mg (3/19/2024), 1,000 mg (4/16/2024), 1,000 mg (5/14/2024), 1,000 mg (7/9/2024), 1,000 mg (11/26/2024), 1,000 mg (8/6/2024), 1,000 mg (9/3/2024), 1,000 mg (10/1/2024), 1,000 mg (10/29/2024), 1,000 mg (12/24/2024), 1,000 mg (1/21/2025), 1,000 mg (2/18/2025)     4/24/2018 Initial Diagnosis    CLL (chronic lymphocytic leukemia) (HCC)        Pertinent Medical History   See details in HPI  04/16/25:      Review of Systems  Reviewed 12 systems.  Symptoms are as in HPI.  No other neurological, cardiac, pulmonary, GI and  symptoms other than listed in HPI.  Presently no fevers, chills, bleeding, bone pains, skin rash, weight loss, night sweats and no swelling of the ankles and no swollen glands      Objective   /74 (Patient Position: Sitting)   Pulse 100   Temp 98.3 °F (36.8 °C) (Temporal)   Resp 18   Ht 6' 1\" (1.854 m)   Wt 92.5 kg (204 lb)   SpO2 98%   BMI 26.91 kg/m²     Pain Screening:  Pain Score: 0-No pain  ECOG   2  Physical Exam  Vitals reviewed.   Constitutional:       General: He is not in acute distress.     Appearance: Normal appearance. He is not ill-appearing.   HENT:      Head: Normocephalic and atraumatic.      Mouth/Throat:      Comments: No thrush.  Eyes:      General: No scleral icterus.     Conjunctiva/sclera: Conjunctivae normal.   Cardiovascular:      Rate and Rhythm: Normal rate and regular rhythm.      Heart sounds: Murmur (Systolic murmur) heard.   Pulmonary:      Effort: Pulmonary effort is normal. No respiratory distress.      Breath sounds: Rhonchi present. No rales.   Abdominal:      General: Abdomen is flat. There is no distension.      Palpations: Abdomen is soft. There is no mass.      Tenderness: There is no abdominal tenderness.      Comments: No " ascites.    Musculoskeletal:         General: No swelling or tenderness. Normal range of motion.      Cervical back: Normal range of motion. No rigidity or tenderness.      Right lower leg: No edema.      Left lower leg: No edema.      Comments: No calf tenderness.   Lymphadenopathy:      Cervical: No cervical adenopathy.      Upper Body:      Right upper body: No supraclavicular or axillary adenopathy.      Left upper body: No supraclavicular or axillary adenopathy.   Skin:     General: Skin is warm.      Coloration: Skin is not jaundiced or pale.      Findings: No bruising or rash.      Nails: There is no clubbing.      Comments: Vascular purpura forearms   Neurological:      General: No focal deficit present.      Mental Status: He is alert and oriented to person, place, and time.      Motor: No weakness.      Coordination: Coordination normal.      Gait: Gait normal.   Psychiatric:         Mood and Affect: Mood normal.         Behavior: Behavior normal.         Thought Content: Thought content normal.         Labs: I have reviewed the following labs:  Lab Results   Component Value Date/Time    WBC 3.93 (L) 04/13/2025 05:10 AM    RBC 4.25 04/13/2025 05:10 AM    Hemoglobin 12.8 04/13/2025 05:10 AM    Hematocrit 39.4 04/13/2025 05:10 AM    MCV 93 04/13/2025 05:10 AM    MCH 30.1 04/13/2025 05:10 AM    RDW 13.5 04/13/2025 05:10 AM    Platelets 237 04/13/2025 05:10 AM    Segmented % 75 04/10/2025 05:45 AM    Lymphocytes % 15 04/12/2025 06:22 AM    Lymphocytes % 5 (L) 04/10/2025 05:45 AM    Monocytes % 14 (H) 04/12/2025 06:22 AM    Monocytes % 8 04/10/2025 05:45 AM    Eosinophils % 0 04/12/2025 06:22 AM    Eosinophils Relative 0 04/10/2025 05:45 AM    Basophils % 0 04/12/2025 06:22 AM    Basophils Relative 1 04/10/2025 05:45 AM    Immature Grans % 11 (H) 04/10/2025 05:45 AM    Absolute Neutrophils 4.51 04/10/2025 05:45 AM     Lab Results   Component Value Date/Time    Potassium 3.9 04/13/2025 08:44 AM    Chloride 109  (H) 04/13/2025 05:10 AM    CO2 25 04/13/2025 05:10 AM    BUN 15 04/13/2025 05:10 AM    Creatinine 0.89 04/13/2025 05:10 AM    Glucose, Fasting 193 (H) 04/10/2025 04:35 AM    Calcium 9.3 04/13/2025 05:10 AM    AST 20 04/10/2025 04:35 AM    ALT 13 04/10/2025 04:35 AM    Alkaline Phosphatase 34 04/10/2025 04:35 AM    Total Protein 5.9 (L) 04/10/2025 04:35 AM    Albumin 3.8 04/10/2025 04:35 AM    Total Bilirubin 0.42 04/10/2025 04:35 AM    eGFR 86 04/13/2025 05:10 AM

## 2025-04-16 NOTE — ASSESSMENT & PLAN NOTE
Patient seen and evaluated by cardiology.  He did have his fenofibrate discontinued and switched to Zetia.  Continue with his current treatment.  Will recheck blood work in the next 6 months.

## 2025-04-16 NOTE — ASSESSMENT & PLAN NOTE
Reviewed hospital record in depth with patient today.  At this time, he states that overall his symptoms have been improving.  He is advised to complete his antibiotics.  Continue with his albuterol as needed.  He is scheduled to see pulmonology next month.

## 2025-04-16 NOTE — ASSESSMENT & PLAN NOTE
Patient has been on ibrutinib 140 mg daily, IVIG every 4 weeks and Gazyva every 4 weeks but not at the same time, alternating.  Also is on prednisone 5 mg daily and folic acid because of previous history of autoimmune hemolytic anemia and prednisone also helps with his breathing.

## 2025-04-16 NOTE — PROGRESS NOTES
Transition of Care Visit:  Name: Cayetano Lucero      : 1955      MRN: [id number]  Encounter Provider: Meseret Elliott DO  Encounter Date: 2025   Encounter department: [facility]    Assessment & Plan  Multifocal pneumonia  Reviewed hospital record in depth with patient today.  At this time, he states that overall his symptoms have been improving.  He is advised to complete his antibiotics.  Continue with his albuterol as needed.  He is scheduled to see pulmonology next month.       Chronic obstructive pulmonary disease, unspecified COPD type (HCC)  Patient appears clinically stable today.  At this time, continue with his current treatment of albuterol.       Chronic lymphocytic leukemia (HCC)  Following with hematology/oncology.  He has been doing well with his current treatments.       Primary hypertension  Blood pressure appears stable today.  Continue with routine home monitoring as well as current treatment with amlodipine prescribed as well as hydrochlorothiazide       Mixed hyperlipidemia  Patient seen and evaluated by cardiology.  He did have his fenofibrate discontinued and switched to Zetia.  Continue with his current treatment.  Will recheck blood work in the next 6 months.            History of Present Illness     Transitional Care Management Review:   Cayetano Lucero is a 69 y.o. male here for TCM follow up.  He was admitted to [facility] on  and subsequently discharged on .  He was made a secondary to multifocal pneumonia.  Prior to his admission, he started develop fevers and chills.  He did present to the ED and had extensive testing.  He was ultimately found to have haemophilus influenza pneumonia.  He was started on IV antibiotics and subsidy discharged with doxycycline to complete his antibiotic treatment.  Since his discharge, he states that he has been feeling well.  He denies any fevers or chills.  He still has a mild cough.    During  "the TCM phone call patient stated:  TCM Call (since 4/2/2025)       Date and time call was made  4/14/2025  9:48 AM    Hospital care reviewed  Records reviewed    Date of Admission  04/09/25    Date of discharge  04/13/25    Diagnosis  Pneumonia    Disposition  Home    Current Symptoms  None          TCM Call (since 4/2/2025)       Post hospital issues  None    Scheduled for follow up?  Yes    Do you need help managing your prescriptions or medications  No    Is transportation to your appointment needed  No    I have advised the patient to call PCP with any new or worsening symptoms  Carlee Morales MA    Living Arrangements  Spouse or Significiant other          HPI  Review of Systems   Constitutional:  Negative for activity change, chills, fatigue and fever.   HENT:  Negative for congestion, ear pain, sinus pressure and sore throat.    Eyes:  Negative for redness, itching and visual disturbance.   Respiratory:  Negative for cough and shortness of breath.    Cardiovascular:  Negative for chest pain and palpitations.   Gastrointestinal:  Negative for abdominal pain, diarrhea and nausea.   Endocrine: Negative for cold intolerance and heat intolerance.   Genitourinary:  Negative for dysuria, flank pain and frequency.   Musculoskeletal:  Negative for arthralgias, back pain, gait problem and myalgias.   Skin:  Negative for color change.   Allergic/Immunologic: Negative for environmental allergies.   Neurological:  Negative for dizziness, numbness and headaches.   Psychiatric/Behavioral:  Negative for behavioral problems and sleep disturbance.      Objective   /74 (BP Location: Left arm, Patient Position: Sitting, Cuff Size: Standard)   Pulse 100   Temp 98.3 °F (36.8 °C) (Temporal)   Ht 6' 1\" (1.854 m)   Wt 92.9 kg (204 lb 12.8 oz)   SpO2 98%   BMI 27.02 kg/m²     Physical Exam  Vitals reviewed.   Constitutional:       General: He is not in acute distress.     Appearance: Normal appearance. He is well-developed. "   HENT:      Head: Normocephalic and atraumatic.      Right Ear: Tympanic membrane, ear canal and external ear normal. There is no impacted cerumen.      Left Ear: Tympanic membrane, ear canal and external ear normal. There is no impacted cerumen.      Nose: Nose normal. No congestion or rhinorrhea.      Mouth/Throat:      Mouth: Mucous membranes are moist.      Pharynx: No oropharyngeal exudate or posterior oropharyngeal erythema.   Eyes:      General: No scleral icterus.        Right eye: No discharge.         Left eye: No discharge.      Extraocular Movements: Extraocular movements intact.      Conjunctiva/sclera: Conjunctivae normal.      Pupils: Pupils are equal, round, and reactive to light.   Neck:      Trachea: No tracheal deviation.   Cardiovascular:      Rate and Rhythm: Normal rate and regular rhythm.      Pulses: Normal pulses.           Dorsalis pedis pulses are 2+ on the right side and 2+ on the left side.        Posterior tibial pulses are 2+ on the right side and 2+ on the left side.      Heart sounds: Normal heart sounds. No murmur heard.     No friction rub. No gallop.   Pulmonary:      Effort: Pulmonary effort is normal. No respiratory distress.      Breath sounds: Normal breath sounds. No wheezing, rhonchi or rales.   Abdominal:      General: Bowel sounds are normal. There is no distension.      Palpations: Abdomen is soft.      Tenderness: There is no abdominal tenderness. There is no guarding or rebound.   Musculoskeletal:         General: Normal range of motion.      Cervical back: Normal range of motion and neck supple.      Right lower leg: No edema.      Left lower leg: No edema.   Lymphadenopathy:      Head:      Right side of head: No submental or submandibular adenopathy.      Left side of head: No submental or submandibular adenopathy.      Cervical: No cervical adenopathy.      Right cervical: No superficial, deep or posterior cervical adenopathy.     Left cervical: No superficial,  deep or posterior cervical adenopathy.   Skin:     General: Skin is warm and dry.      Findings: No erythema.   Neurological:      General: No focal deficit present.      Mental Status: He is alert and oriented to person, place, and time.      Cranial Nerves: No cranial nerve deficit.      Sensory: Sensation is intact. No sensory deficit.      Motor: Motor function is intact.   Psychiatric:         Attention and Perception: Attention and perception normal.         Mood and Affect: Mood is not anxious or depressed.         Speech: Speech normal.         Behavior: Behavior normal.         Thought Content: Thought content normal.         Judgment: Judgment normal.       Medications have been reviewed by provider in current encounter

## 2025-04-16 NOTE — ASSESSMENT & PLAN NOTE
Patient appears clinically stable today.  At this time, continue with his current treatment of albuterol.

## 2025-04-23 ENCOUNTER — TELEPHONE (OUTPATIENT)
Dept: HEMATOLOGY ONCOLOGY | Facility: CLINIC | Age: 70
End: 2025-04-23

## 2025-04-23 ENCOUNTER — HOSPITAL ENCOUNTER (INPATIENT)
Facility: HOSPITAL | Age: 70
LOS: 4 days | Discharge: HOME | DRG: 178 | End: 2025-04-27
Attending: EMERGENCY MEDICINE | Admitting: INTERNAL MEDICINE
Payer: MEDICARE

## 2025-04-23 ENCOUNTER — NURSE TRIAGE (OUTPATIENT)
Age: 70
End: 2025-04-23

## 2025-04-23 ENCOUNTER — APPOINTMENT (EMERGENCY)
Dept: CT IMAGING | Facility: HOSPITAL | Age: 70
DRG: 178 | End: 2025-04-23
Payer: MEDICARE

## 2025-04-23 DIAGNOSIS — F41.9 ANXIETY: ICD-10-CM

## 2025-04-23 DIAGNOSIS — J84.9 INTERSTITIAL LUNG DISEASE (HCC): ICD-10-CM

## 2025-04-23 DIAGNOSIS — R06.09 DYSPNEA ON EXERTION: Primary | ICD-10-CM

## 2025-04-23 DIAGNOSIS — R91.8 LEFT LOWER LOBE PULMONARY INFILTRATE: ICD-10-CM

## 2025-04-23 DIAGNOSIS — R50.9 FEVER, UNSPECIFIED FEVER CAUSE: ICD-10-CM

## 2025-04-23 DIAGNOSIS — C91.10 CLL (CHRONIC LYMPHOCYTIC LEUKEMIA) (HCC): ICD-10-CM

## 2025-04-23 DIAGNOSIS — K20.90 ESOPHAGITIS: ICD-10-CM

## 2025-04-23 DIAGNOSIS — D80.1 HYPOGAMMAGLOBULINEMIA, ACQUIRED (HCC): Primary | ICD-10-CM

## 2025-04-23 DIAGNOSIS — J18.9 MULTIFOCAL PNEUMONIA: ICD-10-CM

## 2025-04-23 DIAGNOSIS — R09.02 HYPOXIA: ICD-10-CM

## 2025-04-23 LAB
ALBUMIN SERPL BCG-MCNC: 4.4 G/DL (ref 3.5–5)
ALP SERPL-CCNC: 42 U/L (ref 34–104)
ALT SERPL W P-5'-P-CCNC: 28 U/L (ref 7–52)
ANION GAP SERPL CALCULATED.3IONS-SCNC: 9 MMOL/L (ref 4–13)
ANISOCYTOSIS BLD QL SMEAR: PRESENT
AST SERPL W P-5'-P-CCNC: 26 U/L (ref 13–39)
BASOPHILS # BLD MANUAL: 0.09 THOUSAND/UL (ref 0–0.1)
BASOPHILS NFR MAR MANUAL: 1 % (ref 0–1)
BILIRUB SERPL-MCNC: 1.08 MG/DL (ref 0.2–1)
BUN SERPL-MCNC: 16 MG/DL (ref 5–25)
CALCIUM SERPL-MCNC: 9.3 MG/DL (ref 8.4–10.2)
CHLORIDE SERPL-SCNC: 104 MMOL/L (ref 96–108)
CO2 SERPL-SCNC: 27 MMOL/L (ref 21–32)
CREAT SERPL-MCNC: 1.02 MG/DL (ref 0.6–1.3)
EOSINOPHIL # BLD MANUAL: 0 THOUSAND/UL (ref 0–0.4)
EOSINOPHIL NFR BLD MANUAL: 0 % (ref 0–6)
ERYTHROCYTE [DISTWIDTH] IN BLOOD BY AUTOMATED COUNT: 14 % (ref 11.6–15.1)
FLUAV RNA RESP QL NAA+PROBE: NEGATIVE
FLUBV RNA RESP QL NAA+PROBE: NEGATIVE
GFR SERPL CREATININE-BSD FRML MDRD: 74 ML/MIN/1.73SQ M
GLUCOSE SERPL-MCNC: 125 MG/DL (ref 65–140)
HCT VFR BLD AUTO: 42.8 % (ref 36.5–49.3)
HGB BLD-MCNC: 13.9 G/DL (ref 12–17)
LG PLATELETS BLD QL SMEAR: PRESENT
LYMPHOCYTES # BLD AUTO: 0.53 THOUSAND/UL (ref 0.6–4.47)
LYMPHOCYTES # BLD AUTO: 2 % (ref 14–44)
MACROCYTES BLD QL AUTO: PRESENT
MCH RBC QN AUTO: 30.3 PG (ref 26.8–34.3)
MCHC RBC AUTO-ENTMCNC: 32.5 G/DL (ref 31.4–37.4)
MCV RBC AUTO: 93 FL (ref 82–98)
MICROCYTES BLD QL AUTO: PRESENT
MONOCYTES # BLD AUTO: 1.06 THOUSAND/UL (ref 0–1.22)
MONOCYTES NFR BLD: 12 % (ref 4–12)
NEUTROPHILS # BLD MANUAL: 7.17 THOUSAND/UL (ref 1.85–7.62)
NEUTS SEG NFR BLD AUTO: 81 % (ref 43–75)
PLATELET # BLD AUTO: 184 THOUSANDS/UL (ref 149–390)
PLATELET BLD QL SMEAR: ADEQUATE
PMV BLD AUTO: 11.4 FL (ref 8.9–12.7)
POLYCHROMASIA BLD QL SMEAR: PRESENT
POTASSIUM SERPL-SCNC: 3.8 MMOL/L (ref 3.5–5.3)
PROT SERPL-MCNC: 6.9 G/DL (ref 6.4–8.4)
RBC # BLD AUTO: 4.59 MILLION/UL (ref 3.88–5.62)
RBC MORPH BLD: PRESENT
RSV RNA RESP QL NAA+PROBE: NEGATIVE
SARS-COV-2 RNA RESP QL NAA+PROBE: NEGATIVE
SODIUM SERPL-SCNC: 140 MMOL/L (ref 135–147)
TARGETS BLD QL SMEAR: PRESENT
VARIANT LYMPHS # BLD AUTO: 4 %
WBC # BLD AUTO: 8.85 THOUSAND/UL (ref 4.31–10.16)

## 2025-04-23 PROCEDURE — 0241U HB NFCT DS VIR RESP RNA 4 TRGT: CPT

## 2025-04-23 PROCEDURE — 96365 THER/PROPH/DIAG IV INF INIT: CPT

## 2025-04-23 PROCEDURE — 84145 PROCALCITONIN (PCT): CPT

## 2025-04-23 PROCEDURE — 99285 EMERGENCY DEPT VISIT HI MDM: CPT

## 2025-04-23 PROCEDURE — 36415 COLL VENOUS BLD VENIPUNCTURE: CPT

## 2025-04-23 PROCEDURE — 85007 BL SMEAR W/DIFF WBC COUNT: CPT

## 2025-04-23 PROCEDURE — 80053 COMPREHEN METABOLIC PANEL: CPT

## 2025-04-23 PROCEDURE — 71260 CT THORAX DX C+: CPT

## 2025-04-23 PROCEDURE — 85027 COMPLETE CBC AUTOMATED: CPT

## 2025-04-23 PROCEDURE — 96375 TX/PRO/DX INJ NEW DRUG ADDON: CPT

## 2025-04-23 RX ORDER — ONDANSETRON 2 MG/ML
4 INJECTION INTRAMUSCULAR; INTRAVENOUS ONCE
Status: COMPLETED | OUTPATIENT
Start: 2025-04-23 | End: 2025-04-23

## 2025-04-23 RX ORDER — ALBUTEROL SULFATE 0.83 MG/ML
5 SOLUTION RESPIRATORY (INHALATION) ONCE
Status: COMPLETED | OUTPATIENT
Start: 2025-04-23 | End: 2025-04-23

## 2025-04-23 RX ORDER — IBUPROFEN 600 MG/1
600 TABLET, FILM COATED ORAL ONCE
Status: COMPLETED | OUTPATIENT
Start: 2025-04-23 | End: 2025-04-23

## 2025-04-23 RX ORDER — CITALOPRAM HYDROBROMIDE 40 MG/1
40 TABLET ORAL DAILY
Qty: 90 TABLET | Refills: 1 | Status: SHIPPED | OUTPATIENT
Start: 2025-04-23

## 2025-04-23 RX ADMIN — CEFEPIME 2000 MG: 2 INJECTION, POWDER, FOR SOLUTION INTRAVENOUS at 22:02

## 2025-04-23 RX ADMIN — IOHEXOL 85 ML: 350 INJECTION, SOLUTION INTRAVENOUS at 19:57

## 2025-04-23 RX ADMIN — SODIUM CHLORIDE, SODIUM LACTATE, POTASSIUM CHLORIDE, AND CALCIUM CHLORIDE 1000 ML: .6; .31; .03; .02 INJECTION, SOLUTION INTRAVENOUS at 17:59

## 2025-04-23 RX ADMIN — ONDANSETRON 4 MG: 2 INJECTION INTRAMUSCULAR; INTRAVENOUS at 17:56

## 2025-04-23 RX ADMIN — IBUPROFEN 600 MG: 600 TABLET ORAL at 17:57

## 2025-04-23 RX ADMIN — ALBUTEROL SULFATE 5 MG: 2.5 SOLUTION RESPIRATORY (INHALATION) at 17:56

## 2025-04-23 RX ADMIN — IPRATROPIUM BROMIDE 0.5 MG: 0.5 SOLUTION RESPIRATORY (INHALATION) at 17:56

## 2025-04-23 NOTE — TELEPHONE ENCOUNTER
"FOLLOW UP: 05/14/25 GIA Nieves (Dr. Crisostomo)    REASON FOR CONVERSATION: Cough    SYMPTOMS: C/O Moist productive cough that started this AM. Sputum is thick green/white. Pt is having coughing spasm at times, experiencing mild SOB, states has to take it slow to avoid SOB. Pt felt cold, severe body aches, facial/sinus congestion. States starting to feel like when he was in hospital. Spouse noted  T104, pt took Tylenol, T101. Due to temp, age, and current leukemia treatments advised to go to ER. Advised to call back with any worsening symptoms. Pt and spouse acknowledged instructions, agreed to go to ER, stated they thought he needed to go. Advised to call Oncologist and alert. Patient had no further questions and/or concerns at this time.    OTHER: FiNC Message send to Oncologist to notify of pt status.    DISPOSITION: Go to ED/U[facility] Now (Or to Office with PCP Approval)    Reason for Disposition   Fever > 101 F (38.3 C) and over 60 years of age   Patient sounds very sick or weak to the triager    Answer Assessment - Initial Assessment Questions  1. ONSET: \"When did the cough begin?\"       04/23/25  2. SEVERITY: \"How bad is the cough today?\"       Coughing spasms  3. SPUTUM: \"Describe the color of your sputum\" (e.g., none, dry cough; clear, white, yellow, green)     Thick white/green  4. HEMOPTYSIS: \"Are you coughing up any blood?\" If Yes, ask: \"How much?\" (e.g., flecks, streaks, tablespoons, etc.)      No  5. DIFFICULTY BREATHING: \"Are you having difficulty breathing?\" If Yes, ask: \"How bad is it?\" (e.g., mild, moderate, severe)       Mild  6. FEVER: \"Do you have a fever?\" If Yes, ask: \"What is your temperature, how was it measured, and when did it start?\"      T104, Tylenol, 101  7. CARDIAC HISTORY: \"Do you have any history of heart disease?\" (e.g., heart attack, congestive heart failure)       MI w/stents  8. LUNG HISTORY: \"Do you have any history of lung disease?\"  (e.g., pulmonary embolus, asthma, emphysema)      " "COPD  9. PE RISK FACTORS: \"Do you have a history of blood clots?\" (or: recent major surgery, recent prolonged travel, bedridden)      Denies  10. OTHER SYMPTOMS: \"Do you have any other symptoms?\" (e.g., runny nose, wheezing, chest pain)        Chest congestion, Sinus congestion    Protocols used: Cough-Adult-[place]    "

## 2025-04-23 NOTE — ED PROVIDER NOTES
Time reflects when diagnosis was documented in both MDM as applicable and the Disposition within this note       Time User Action Codes Description Comment    4/23/2025  9:46 PM Jose Ramon Marx [J18.9] Multifocal pneumonia     4/23/2025  9:46 PM Jose Ramon Marx [J84.9] Interstitial lung disease (HCC)     4/23/2025  9:46 PM Jose Ramon Marx Add [K20.90] Esophagitis     4/23/2025  9:57 PM Jose Ramon Marx [R09.02] Hypoxia           ED Disposition       ED Disposition   Admit    Condition   Stable    Date/Time   Wed Apr 23, 2025 10:04 PM    Comment   Case was discussed with MARIELA and the patient's admission status was agreed to be Admission Status: inpatient status to the service of Dr. Paul               Assessment & Plan       Medical Decision Making      Pt will be admitted for worsening multifocal pna and hypoxia. Will likely need pulm consult.     Amount and/or Complexity of Data Reviewed  Labs: ordered.  Radiology: ordered. Decision-making details documented in ED Course.    Risk  Prescription drug management.  Decision regarding hospitalization.        ED Course as of 04/23/25 2205 Wed Apr 23, 2025 2039 Pt dipped to 91% while ambulating    2143 CT chest with contrast  1) Irregular lower lobe opacities in the central, partially groundglass opacity in the right upper lobe, increased from 4/9/2025. These findings likely represent worsening multifocal pneumonia. Other considerations include pneumonitis.     2) Some reticular opacities in the lower lobes and in the right upper lobe, as well as mild diffuse subpleural reticulation was also present on the 2022 CT, and may indicate a component of underlying interstitial lung disease. No honeycombing.   Pulmonology consultation may be of value.     3) Bronchial wall thickening with some mucous plugging in the lower lobes, likely also related to the aforementioned process.     4) 7 mm left upper lobe groundglass nodule with no associated solid component, increased  from 4 mm in 2022. This may represent an adenocarcinoma spectrum lesion. Follow-up noncontrast chest CT is recommended in 12 months.     5) Circumferential wall thickening of the distal esophagus, which may be related to esophagitis or gastroesophageal reflux.     6) Additional findings as above.     2157 Message sent to [facility] for admission       Medications   cefepime (MAXIPIME) 2 g/50 mL dextrose IVPB (2,000 mg Intravenous New Bag 4/23/25 2202)   lactated ringers bolus 1,000 mL (0 mL Intravenous Stopped 4/23/25 1906)   ondansetron (ZOFRAN) injection 4 mg (4 mg Intravenous Given 4/23/25 1756)   ibuprofen (MOTRIN) tablet 600 mg (600 mg Oral Given 4/23/25 1757)   albuterol inhalation solution 5 mg (5 mg Nebulization Given 4/23/25 1756)   ipratropium (ATROVENT) 0.02 % inhalation solution 0.5 mg (0.5 mg Nebulization Given 4/23/25 1756)   iohexol (OMNIPAQUE) 350 MG/ML injection (MULTI-DOSE) 85 mL (85 mL Intravenous Given 4/23/25 1957)       ED Risk Strat Scores                    No data recorded                            History of Present Illness       Chief Complaint   Patient presents with    Flu Symptoms     Pt was dx with multi focal pneumonia. Pt states was feeling better but is now feeling short of breath, chills, fever of 104 at home, body aches and spitting up green mucous. 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    Dyslipidemia     Edema extremities     Esophageal ulcer     H/O blood clots     Hemolytic anemia (HCC)     Hiatal hernia     History of TIA (transient ischemic attack)     Hyperlipidemia     Hypertension     Listeria  infection 2018    Multiple gastric ulcers     Myocardial infarction (HCC) 2004    acute    Neutropenia (HCC)     Obese     Positive QuantiFERON-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 08/21/2017    Recurrent sinusitis     Thrombocytopenia (HCC)     Vertigo       Past Surgical History:   Procedure Laterality Date    ARTHROSCOPIC REPAIR ACL      lt knee    CARDIAC SURGERY      cardiac cath with stent    FOOT SURGERY      IR PORT CHECK  5/17/2019    KNEE ARTHROSCOPY      OTHER SURGICAL HISTORY      cardiac cath lesion 1, 1st adjunct treat device: stent    PORTACATH PLACEMENT      AK ESOPHAGOGASTRODUODENOSCOPY TRANSORAL DIAGNOSTIC N/A 10/5/2017    Procedure: ESOPHAGOGASTRODUODENOSCOPY (EGD) with bx;  Surgeon: Winifred Hansen DO;  Location: [facility];  Service: Gastroenterology    AK ESOPHAGOGASTRODUODENOSCOPY TRANSORAL DIAGNOSTIC N/A 3/8/2018    Procedure: ESOPHAGOGASTRODUODENOSCOPY (EGD) with biopsy;  Surgeon: Winifred Hansen DO;  Location: [facility];  Service: Gastroenterology    AK ESOPHAGOGASTRODUODENOSCOPY TRANSORAL DIAGNOSTIC N/A 6/20/2018    Procedure: ESOPHAGOGASTRODUODENOSCOPY (EGD) with Dilation;  Surgeon: Winifred Hansen DO;  Location: [facility];  Service: Gastroenterology    AK ESOPHAGOGASTRODUODENOSCOPY TRANSORAL DIAGNOSTIC N/A 10/18/2018    Procedure: ESOPHAGOGASTRODUODENOSCOPY (EGD) with dilation;  Surgeon: Edu Mejía MD;  Location: [facility];  Service: Gastroenterology    AK ESOPHAGOGASTRODUODENOSCOPY TRANSORAL DIAGNOSTIC N/A 6/7/2024    Procedure: ESOPHAGOGASTRODUODENOSCOPY (EGD);  Surgeon: Immanuel Gonsales MD;  Location: [facility];  Service: Thoracic    AK ESOPHAGOSCOPY FLEXIBLE TRANSORAL WITH BIOPSY N/A 9/2/2016    Procedure: ESOPHAGOGASTRODUODENOSCOPY (EGD); ESOPHAGEAL DILATION; ESOPHAGEAL BIOPSY;  Surgeon: Abdi Holcomb MD;  Location: [facility];  Service: Thoracic    AK LAPS RPR PARAESPHGL HRNA INCL FUNDPLSTY W/O MESH N/A 6/7/2024    Procedure: REPAIR HERNIA PARAESOPHAGEAL LAPAROSCOPIC W ROBOTICS TYPE 3. PARTIAL  FUNDOPLICATION. MESH.;  Surgeon: Immanuel Gonsales MD;  [facility];  Service: Thoracic    TONSILLECTOMY      UPPER GASTROINTESTINAL ENDOSCOPY      x 6      Family History   Problem Relation Age of Onset    Leukemia Mother         chronic    Colon polyps Mother         sidmoid    Parkinsonism Father       Social History     Tobacco Use    Smoking status: Former     Current packs/day: 0.00     Average packs/day: 2.0 packs/day for 33.0 years (66.0 ttl pk-yrs)     Types: Cigarettes     Start date:      Quit date:      Years since quittin.3     Passive exposure: Past    Smokeless tobacco: Never   Vaping Use    Vaping status: Never Used   Substance Use Topics    Alcohol use: Yes     Alcohol/week: 2.0 standard drinks of alcohol     Types: 2 Cans of beer per week     Comment: social use as per [employer]    Drug use: Never      E-Cigarette/Vaping    E-Cigarette Use Never User       E-Cigarette/Vaping Substances    Nicotine No     THC No     CBD No     Flavoring No     Other No     Unknown No       I have reviewed and agree with the history as documented.     69 YOM with PMH COPD not on oxygen, CLL, CAD presents today with complaint of fever, cough, congestion, weakness, body aches starting last night. Tmax 104 at home. Has been taking Tylenol. Pt was diagnosed with multifocal pna on  and was admitted to the hospital and discharged  with doxy and cefuroxime. Pt reports he finally felt like himself on  and yesterday until last night when the symptoms started.         Review of Systems   Constitutional:  Positive for fever. Negative for chills.   HENT:  Negative for sore throat.    Respiratory:  Positive for cough. Negative for shortness of breath.    Cardiovascular:  Negative for chest pain and palpitations.   Gastrointestinal:  Positive for nausea. Negative for abdominal pain, diarrhea and vomiting.   Musculoskeletal:  Positive for myalgias.   Neurological:  Positive for weakness.            Objective       ED Triage Vitals   Temperature Pulse Blood Pressure Respirations SpO2 Patient Position - Orthostatic VS   04/23/25 1601 04/23/25 1601 04/23/25 1602 04/23/25 1601 04/23/25 1601 04/23/25 1601   98.2 °F (36.8 °C) 91 135/80 22 96 % Sitting      Temp Source Heart Rate Source BP Location FiO2 (%) Pain Score    04/23/25 1601 04/23/25 1601 04/23/25 1601 -- 04/23/25 1757    Oral Monitor Right arm  3      Vitals      Date and Time Temp Pulse SpO2 Resp BP Pain Score FACES Pain Rating User   04/23/25 2030 99.9 °F (37.7 °C) 85 93 % 19 128/60 -- -- KA   04/23/25 1921 -- -- 91 % -- -- -- -- KA   04/23/25 1900 -- 94 93 % 20 108/58 -- -- KA   04/23/25 1830 -- 89 94 % 20 121/62 -- --    04/23/25 1757 -- -- -- -- -- 3 --    04/23/25 1730 100.3 °F (37.9 °C) 91 96 % 20 143/71 -- -- SL   04/23/25 1602 -- -- -- -- 135/80 -- -- SS   04/23/25 1601 98.2 °F (36.8 °C) 91 96 % 22 -- -- -- SS            Physical Exam  Vitals and nursing note reviewed.   Constitutional:       General: He is not in acute distress.     Appearance: Normal appearance. He is well-developed.   HENT:      Head: Normocephalic and atraumatic.   Eyes:      Conjunctiva/sclera: Conjunctivae normal.   Cardiovascular:      Rate and Rhythm: Normal rate and regular rhythm.      Heart sounds: No murmur heard.  Pulmonary:      Effort: Pulmonary effort is normal. No respiratory distress.      Breath sounds: Normal breath sounds.   Abdominal:      Palpations: Abdomen is soft.      Tenderness: There is no abdominal tenderness.   Musculoskeletal:         General: No swelling.      Cervical back: Neck supple.   Skin:     General: Skin is warm and dry.      Capillary Refill: Capillary refill takes less than 2 seconds.   Neurological:      Mental Status: He is alert.   Psychiatric:         Mood and Affect: Mood normal.         Results Reviewed       Procedure Component Value Units Date/Time    FLU/RSV/COVID - if FLU/RSV clinically relevant (2hr TAT)  [[id number]]  (Normal) Collected: 04/23/25 1756    Lab Status: Final result Specimen: Nares from Nose Updated: 04/23/25 1848     SARS-CoV-2 Negative     INFLUENZA A PCR Negative     INFLUENZA B PCR Negative     RSV PCR Negative    Narrative:      This test has been performed using the CoV-2/Flu/RSV plus assay on the [employer] GeneXpert platform. This test has been validated by the  and verified by the performing laboratory.     This test is designed to amplify and detect the following: nucleocapsid (N), envelope (E), and RNA-dependent RNA polymerase (RdRP) genes of the SARS-CoV-2 genome; matrix (M), basic polymerase (PB2), and acidic protein (PA) segments of the influenza A genome; matrix (M) and non-structural protein (NS) segments of the influenza B genome, and the nucleocapsid genes of RSV A and RSV B.     Positive results are indicative of the presence of Flu A, Flu B, RSV, and/or SARS-CoV-2 RNA. Positive results for SARS-CoV-2 or suspected novel influenza should be reported to state, local, or federal health departments according to local reporting requirements.      All results should be assessed in conjunction with clinical presentation and other laboratory markers for clinical management.     FOR PEDIATRIC PATIENTS - copy/paste COVID Guidelines URL to browser: [email or web address]       Manual Differential(PHLEBS Do Not Order) [[id number]]  (Abnormal) Collected: 04/23/25 1756    Lab Status: Final result Specimen: Blood from Arm, Right Updated: 04/23/25 1844     Segmented % 81 %      Lymphocytes % 2 %      Monocytes % 12 %      Eosinophils % 0 %      Basophils % 1 %      Atypical Lymphocytes % 4 %      Absolute Neutrophils 7.17 Thousand/uL      Absolute Lymphocytes 0.53 Thousand/uL      Absolute Monocytes 1.06 Thousand/uL      Absolute Eosinophils 0.00 Thousand/uL      Absolute Basophils 0.09 Thousand/uL      Total Counted --     RBC Morphology  Present     Platelet Estimate Adequate     Large Platelet Present     Anisocytosis Present     Macrocytes Present     Microcytes Present     Polychromasia Present     Target Cells Present    Comprehensive metabolic panel [[id number]]  (Abnormal) Collected: 04/23/25 1756    Lab Status: Final result Specimen: Blood from Arm, Right Updated: 04/23/25 1826     Sodium 140 mmol/L      Potassium 3.8 mmol/L      Chloride 104 mmol/L      CO2 27 mmol/L      ANION GAP 9 mmol/L      BUN 16 mg/dL      Creatinine 1.02 mg/dL      Glucose 125 mg/dL      Calcium 9.3 mg/dL      AST 26 U/L      ALT 28 U/L      Alkaline Phosphatase 42 U/L      Total Protein 6.9 g/dL      Albumin 4.4 g/dL      Total Bilirubin 1.08 mg/dL      eGFR 74 ml/min/1.73sq m     Narrative:      National Kidney Disease Foundation guidelines for Chronic Kidney Disease (CKD):     Stage 1 with normal or high GFR (GFR > 90 mL/min/1.73 square meters)    Stage 2 Mild CKD (GFR = 60-89 mL/min/1.73 square meters)    Stage 3A Moderate CKD (GFR = 45-59 mL/min/1.73 square meters)    Stage 3B Moderate CKD (GFR = 30-44 mL/min/1.73 square meters)    Stage 4 Severe CKD (GFR = 15-29 mL/min/1.73 square meters)    Stage 5 End Stage CKD (GFR <15 mL/min/1.73 square meters)  Note: GFR calculation is accurate only with a steady state creatinine    CBC and differential [[id number]]  (Normal) Collected: 04/23/25 1756    Lab Status: Final result Specimen: Blood from Arm, Right Updated: 04/23/25 1808     WBC 8.85 Thousand/uL      RBC 4.59 Million/uL      Hemoglobin 13.9 g/dL      Hematocrit 42.8 %      MCV 93 fL      MCH 30.3 pg      MCHC 32.5 g/dL      RDW 14.0 %      MPV 11.4 fL      Platelets 184 Thousands/uL     Narrative:      This is an appended report.  These results have been appended to a previously verified report.            CT chest with contrast   Final Interpretation by Inocencia Hollingsworth MD (04/23 2133)      1) Irregular lower lobe opacities in the central, partially groundglass  opacity in the right upper lobe, increased from 4/9/2025. These findings likely represent worsening multifocal pneumonia. Other considerations include pneumonitis.      2) Some reticular opacities in the lower lobes and in the right upper lobe, as well as mild diffuse subpleural reticulation was also present on the 2022 CT, and may indicate a component of underlying interstitial lung disease. No honeycombing.    Pulmonology consultation may be of value.      3) Bronchial wall thickening with some mucous plugging in the lower lobes, likely also related to the aforementioned process.      4) 7 mm left upper lobe groundglass nodule with no associated solid component, increased from 4 mm in 2022. This may represent an adenocarcinoma spectrum lesion. Follow-up noncontrast chest CT is recommended in 12 months.      5) Circumferential wall thickening of the distal esophagus, which may be related to esophagitis or gastroesophageal reflux.      6) Additional findings as above.      The study was marked in EPIC for immediate notification.            Workstation performed: [id number]             Procedures    ED Medication and Procedure Management   Prior to Admission Medications   Prescriptions Last Dose Informant Patient Reported? Taking?   Blood Glucose Monitoring Suppl (FreeStyle Freedom Lite) w/Device KIT  Self No No   Sig: Use 3 (three) times a day   Continuous Blood Gluc  (FreeStyle Ashley 2 [place]) JOE  Self No No   Sig: Use to scan sensor premeals and at bedtime   Continuous Glucose Sensor (FreeStyle Ashley 2 Sensor) MISC  Self No No   Sig: Apply 1 sensor every 14 days. Use as directed with Freestyle Ashley 2 reader.   Diclofenac Sodium (VOLTAREN) 1 %  Self No No   Sig: Apply 4 g topically 4 (four) times a day   Ibrutinib 140 MG CAPS  Self No No   Sig: Take 1 capsule (140 mg total) by mouth daily   Injection Device for Insulin (CeQur Simplicity 2U) JOE  Self No No   Sig: Use 1 patch every 3 days, disp 1 box of  10 patches for 30 days supply   Injection Device for Insulin (CeQur Simplicity ) MISC  Self No No   Sig: Use to insert simplicity device.   Insulin Pen Needle (BD Pen Needle Inez U/F) 32G X 4 MM MISC  Self No No   Sig: Use 3 (three) times a day   LORazepam (ATIVAN) 0.5 mg tablet  Self No No   Sig: Take 1 tablet (0.5 mg total) by mouth daily as needed for anxiety   Lancets (FREESTYLE) lancets  Self No No   Sig: Use as instructed--test 2 times a day    E 11.9   Lancets (freestyle) lancets  Self No No   Sig: TEST BLOOD SUGAR 3 TIMES A DAY   Tresiba FlexTouch 100 units/mL injection pen  Self No No   Sig: INJECT 12 UNITS SUB-Q DAILY AS DIRECTED.   Vonoprazan Fumarate 20 MG TABS  Self No No   Sig: Take 20 mg by mouth in the morning   acyclovir (ZOVIRAX) 400 MG tablet  Self No No   Sig: TAKE 1 TABLET TWICE A DAY   albuterol (2.5 mg/3 mL) 0.083 % nebulizer solution  Self No No   Sig: Take 3 mL (2.5 mg total) by nebulization every 6 (six) hours as needed for wheezing or shortness of breath   albuterol (Ventolin HFA) 90 mcg/act inhaler  Self No No   Sig: USE 2 INHALATIONS EVERY 6 HOURS AS NEEDED FOR WHEEZING   amLODIPine (NORVASC) 10 mg tablet  Self No No   Sig: TAKE 1 TABLET DAILY   aspirin 81 MG tablet  Self Yes No   Sig: Take 81 mg by mouth daily Every other day   benzonatate (TESSALON) 200 MG capsule   No No   Sig: Take 1 capsule (200 mg total) by mouth 3 (three) times a day as needed for cough   citalopram (CeleXA) 40 mg tablet   No No   Sig: TAKE 1 TABLET DAILY   ezetimibe (ZETIA) 10 mg tablet   No No   Sig: Take 1 tablet (10 mg total) by mouth daily at bedtime   folic acid (FOLVITE) 1 mg tablet  Self Yes No   Sig: Take 800 mcg by mouth daily    gabapentin (NEURONTIN) 600 MG tablet  Self No No   Sig: TAKE 1 TABLET DAILY   glucose blood (FREESTYLE LITE) test strip  Self No No   Sig: Check blood sugar 3 times a day   hydroCHLOROthiazide 12.5 mg tablet   No No   Sig: Take 1 tablet (12.5 mg total) by mouth daily Do  not start before 2025.   insulin degludec (Tresiba FlexTouch) 100 units/mL injection pen  Self No No   Sig: Inject 18 Units under the skin daily at bedtime   insulin lispro (Admelog SoloStar) 100 units/mL injection pen  Self No No   Si-14 units before meals   losartan (COZAAR) 100 MG tablet  Self No No   Sig: TAKE 1 TABLET DAILY   mometasone-formoterol (Dulera) 200-5 MCG/ACT inhaler  Self No No   Sig: Inhale 2 puffs 2 (two) times a day Rinse mouth after use.   multivitamin (THERAGRAN) TABS  Self Yes No   Sig: Take 1 tablet by mouth daily   naloxone (NARCAN) 4 mg/0.1 mL nasal spray  Self No No   Sig: Administer 1 spray into a nostril. If no response after 2-3 minutes, give another dose in the other nostril using a new spray.   obinutuzumab (GAZYVA) 1000 mg/40 mL SOLN  Self Yes No   Sig: Infuse into a venous catheter   oxyCODONE (ROXICODONE) 10 MG TABS  Self No No   Sig: Take 1 tablet (10 mg total) by mouth every 6 (six) hours as needed for moderate pain Max Daily Amount: 40 mg   potassium chloride (Klor-Con M20) 20 mEq tablet   No No   Sig: Take 1 tablet (20 mEq total) by mouth daily Do not start before 2025.   predniSONE 5 mg tablet  Self No No   Sig: Take 1 tablet (5 mg total) by mouth daily      Facility-Administered Medications Last Administration Doses Remaining   bupivacaine (MARCAINE) 0.25 % injection 1 mL 2024  2:30 PM    lidocaine (XYLOCAINE) 1 % injection 1 mL 2024  2:30 PM    triamcinolone acetonide (KENALOG-40) 40 mg/mL injection 20 mg 2024  2:30 PM         Patient's Medications   Discharge Prescriptions    No medications on file     No discharge procedures on file.  ED SEPSIS DOCUMENTATION   Time reflects when diagnosis was documented in both MDM as applicable and the Disposition within this note       Time User Action Codes Description Comment    2025  9:46 PM Jose Ramon Marx Add [J18.9] Multifocal pneumonia     2025  9:46 PM Jose Ramon Marx Add [J84.9]  Interstitial lung disease (HCC)     4/23/2025  9:46 PM Jose Ramon Marx Add [K20.90] Esophagitis     4/23/2025  9:57 PM Jose Ramon Marx Add [R09.02] Hypoxia            Initial Sepsis Screening       Row Name 04/23/25 1630                Is the patient's history suggestive of a new or worsening infection? Yes (Proceed)  -KM        Suspected source of infection pneumonia  -KM        Indicate SIRS criteria Tachycardia > 90 bpm  -KM        Are two or more of the above signs & symptoms of infection both present and new to the patient? No  -KM                  User Key  (r) = Recorded By, (t) = Taken By, (c) = Cosigned By      Initials Name Provider Type     Jose Ramon Marx PA-C Physician Assistant                       Jose Ramon Marx PA-C  04/23/25 6804

## 2025-04-23 NOTE — Clinical Note
Case was discussed with MARIELA and the patient's admission status was agreed to be Admission Status: inpatient status to the service of Dr. Cam .

## 2025-04-23 NOTE — TELEPHONE ENCOUNTER
Spoke with patient. Wife on the phone as well. She found him on the couch today not in usual state, was not as responsive/active as usual, blanket over him.   Fever 104 -- took tylenol, down to 100 now. With sxs of malaise and aches.   With compromised immune system -- needs to go to ED  ADT order placed

## 2025-04-24 LAB
GLUCOSE SERPL-MCNC: 109 MG/DL (ref 65–140)
GLUCOSE SERPL-MCNC: 112 MG/DL (ref 65–140)
GLUCOSE SERPL-MCNC: 123 MG/DL (ref 65–140)
GLUCOSE SERPL-MCNC: 87 MG/DL (ref 65–140)
GLUCOSE SERPL-MCNC: 98 MG/DL (ref 65–140)
L PNEUMO1 AG UR QL IA.RAPID: NEGATIVE
PROCALCITONIN SERPL-MCNC: 0.12 NG/ML
S PNEUM AG UR QL: NEGATIVE

## 2025-04-24 PROCEDURE — 87070 CULTURE OTHR SPECIMN AEROBIC: CPT

## 2025-04-24 PROCEDURE — 99223 1ST HOSP IP/OBS HIGH 75: CPT | Performed by: INTERNAL MEDICINE

## 2025-04-24 PROCEDURE — 87081 CULTURE SCREEN ONLY: CPT | Performed by: STUDENT IN AN ORGANIZED HEALTH CARE EDUCATION/TRAINING PROGRAM

## 2025-04-24 PROCEDURE — 82948 REAGENT STRIP/BLOOD GLUCOSE: CPT

## 2025-04-24 PROCEDURE — 87205 SMEAR GRAM STAIN: CPT

## 2025-04-24 PROCEDURE — 87449 NOS EACH ORGANISM AG IA: CPT

## 2025-04-24 PROCEDURE — 99222 1ST HOSP IP/OBS MODERATE 55: CPT | Performed by: INTERNAL MEDICINE

## 2025-04-24 RX ORDER — PANTOPRAZOLE SODIUM 40 MG/1
40 TABLET, DELAYED RELEASE ORAL
Status: DISCONTINUED | OUTPATIENT
Start: 2025-04-24 | End: 2025-04-27 | Stop reason: HOSPADM

## 2025-04-24 RX ORDER — ASPIRIN 81 MG/1
81 TABLET, CHEWABLE ORAL DAILY
Status: DISCONTINUED | OUTPATIENT
Start: 2025-04-24 | End: 2025-04-27 | Stop reason: HOSPADM

## 2025-04-24 RX ORDER — ALBUTEROL SULFATE 90 UG/1
2 INHALANT RESPIRATORY (INHALATION) EVERY 6 HOURS PRN
Status: DISCONTINUED | OUTPATIENT
Start: 2025-04-24 | End: 2025-04-27 | Stop reason: HOSPADM

## 2025-04-24 RX ORDER — INSULIN GLARGINE 100 [IU]/ML
18 INJECTION, SOLUTION SUBCUTANEOUS
Status: DISCONTINUED | OUTPATIENT
Start: 2025-04-24 | End: 2025-04-24

## 2025-04-24 RX ORDER — SODIUM CHLORIDE, SODIUM LACTATE, POTASSIUM CHLORIDE, CALCIUM CHLORIDE 600; 310; 30; 20 MG/100ML; MG/100ML; MG/100ML; MG/100ML
20 INJECTION, SOLUTION INTRAVENOUS CONTINUOUS
Status: CANCELLED | OUTPATIENT
Start: 2025-04-24

## 2025-04-24 RX ORDER — VANCOMYCIN HYDROCHLORIDE 750 MG/150ML
750 INJECTION, SOLUTION INTRAVENOUS EVERY 12 HOURS
Status: DISCONTINUED | OUTPATIENT
Start: 2025-04-24 | End: 2025-04-25

## 2025-04-24 RX ORDER — INSULIN LISPRO 100 [IU]/ML
1-6 INJECTION, SOLUTION INTRAVENOUS; SUBCUTANEOUS
Status: DISCONTINUED | OUTPATIENT
Start: 2025-04-24 | End: 2025-04-27 | Stop reason: HOSPADM

## 2025-04-24 RX ORDER — ACYCLOVIR 800 MG/1
400 TABLET ORAL 2 TIMES DAILY
Status: DISCONTINUED | OUTPATIENT
Start: 2025-04-24 | End: 2025-04-27

## 2025-04-24 RX ORDER — PREDNISONE 5 MG/1
5 TABLET ORAL DAILY
Status: DISCONTINUED | OUTPATIENT
Start: 2025-04-24 | End: 2025-04-27 | Stop reason: HOSPADM

## 2025-04-24 RX ORDER — INSULIN GLARGINE 100 [IU]/ML
18 INJECTION, SOLUTION SUBCUTANEOUS
Status: DISCONTINUED | OUTPATIENT
Start: 2025-04-24 | End: 2025-04-26

## 2025-04-24 RX ORDER — LORAZEPAM 0.5 MG/1
0.5 TABLET ORAL DAILY PRN
Status: DISCONTINUED | OUTPATIENT
Start: 2025-04-24 | End: 2025-04-27 | Stop reason: HOSPADM

## 2025-04-24 RX ORDER — LOSARTAN POTASSIUM 50 MG/1
100 TABLET ORAL DAILY
Status: DISCONTINUED | OUTPATIENT
Start: 2025-04-24 | End: 2025-04-27 | Stop reason: HOSPADM

## 2025-04-24 RX ORDER — FOLIC ACID 0.4 MG
800 TABLET ORAL DAILY
Status: DISCONTINUED | OUTPATIENT
Start: 2025-04-24 | End: 2025-04-27 | Stop reason: HOSPADM

## 2025-04-24 RX ORDER — ALBUTEROL SULFATE 0.83 MG/ML
2.5 SOLUTION RESPIRATORY (INHALATION) EVERY 6 HOURS PRN
Status: DISCONTINUED | OUTPATIENT
Start: 2025-04-24 | End: 2025-04-27 | Stop reason: HOSPADM

## 2025-04-24 RX ORDER — HYDROCHLOROTHIAZIDE 12.5 MG/1
12.5 TABLET ORAL DAILY
Status: DISCONTINUED | OUTPATIENT
Start: 2025-04-24 | End: 2025-04-27 | Stop reason: HOSPADM

## 2025-04-24 RX ORDER — CITALOPRAM HYDROBROMIDE 20 MG/1
40 TABLET ORAL DAILY
Status: DISCONTINUED | OUTPATIENT
Start: 2025-04-24 | End: 2025-04-27 | Stop reason: HOSPADM

## 2025-04-24 RX ORDER — AMLODIPINE BESYLATE 10 MG/1
10 TABLET ORAL DAILY
Status: DISCONTINUED | OUTPATIENT
Start: 2025-04-24 | End: 2025-04-27 | Stop reason: HOSPADM

## 2025-04-24 RX ORDER — GABAPENTIN 300 MG/1
600 CAPSULE ORAL DAILY
Status: DISCONTINUED | OUTPATIENT
Start: 2025-04-24 | End: 2025-04-27 | Stop reason: HOSPADM

## 2025-04-24 RX ORDER — GUAIFENESIN/DEXTROMETHORPHAN 100-10MG/5
10 SYRUP ORAL EVERY 4 HOURS PRN
Status: DISCONTINUED | OUTPATIENT
Start: 2025-04-24 | End: 2025-04-27 | Stop reason: HOSPADM

## 2025-04-24 RX ORDER — GUAIFENESIN 600 MG/1
600 TABLET, EXTENDED RELEASE ORAL 2 TIMES DAILY
Status: DISCONTINUED | OUTPATIENT
Start: 2025-04-24 | End: 2025-04-27 | Stop reason: HOSPADM

## 2025-04-24 RX ORDER — EZETIMIBE 10 MG/1
10 TABLET ORAL
Status: DISCONTINUED | OUTPATIENT
Start: 2025-04-24 | End: 2025-04-27 | Stop reason: HOSPADM

## 2025-04-24 RX ORDER — PANTOPRAZOLE SODIUM 20 MG/1
20 TABLET, DELAYED RELEASE ORAL
Status: DISCONTINUED | OUTPATIENT
Start: 2025-04-24 | End: 2025-04-24

## 2025-04-24 RX ORDER — BENZONATATE 100 MG/1
200 CAPSULE ORAL 3 TIMES DAILY PRN
Status: DISCONTINUED | OUTPATIENT
Start: 2025-04-24 | End: 2025-04-27 | Stop reason: HOSPADM

## 2025-04-24 RX ORDER — INSULIN LISPRO 100 [IU]/ML
12 INJECTION, SOLUTION INTRAVENOUS; SUBCUTANEOUS
Status: DISCONTINUED | OUTPATIENT
Start: 2025-04-24 | End: 2025-04-26

## 2025-04-24 RX ORDER — OXYCODONE HYDROCHLORIDE 10 MG/1
10 TABLET ORAL EVERY 6 HOURS PRN
Refills: 0 | Status: DISCONTINUED | OUTPATIENT
Start: 2025-04-24 | End: 2025-04-27 | Stop reason: HOSPADM

## 2025-04-24 RX ORDER — FLUTICASONE FUROATE AND VILANTEROL 200; 25 UG/1; UG/1
1 POWDER RESPIRATORY (INHALATION)
Status: DISCONTINUED | OUTPATIENT
Start: 2025-04-24 | End: 2025-04-27 | Stop reason: HOSPADM

## 2025-04-24 RX ORDER — INSULIN GLARGINE 100 [IU]/ML
14 INJECTION, SOLUTION SUBCUTANEOUS ONCE
Status: COMPLETED | OUTPATIENT
Start: 2025-04-24 | End: 2025-04-24

## 2025-04-24 RX ORDER — ACETAMINOPHEN 325 MG/1
650 TABLET ORAL EVERY 6 HOURS PRN
Status: DISCONTINUED | OUTPATIENT
Start: 2025-04-24 | End: 2025-04-27 | Stop reason: HOSPADM

## 2025-04-24 RX ADMIN — CEFEPIME 2000 MG: 2 INJECTION, POWDER, FOR SOLUTION INTRAVENOUS at 12:03

## 2025-04-24 RX ADMIN — EZETIMIBE 10 MG: 10 TABLET ORAL at 22:05

## 2025-04-24 RX ADMIN — INSULIN GLARGINE 9 UNITS: 100 INJECTION, SOLUTION SUBCUTANEOUS at 22:08

## 2025-04-24 RX ADMIN — HYDROCHLOROTHIAZIDE 12.5 MG: 12.5 TABLET ORAL at 07:42

## 2025-04-24 RX ADMIN — GUAIFENESIN AND DEXTROMETHORPHAN 10 ML: 100; 10 SYRUP ORAL at 00:59

## 2025-04-24 RX ADMIN — VANCOMYCIN HYDROCHLORIDE 750 MG: 750 INJECTION, SOLUTION INTRAVENOUS at 14:13

## 2025-04-24 RX ADMIN — ACYCLOVIR 400 MG: 800 TABLET ORAL at 17:10

## 2025-04-24 RX ADMIN — ACYCLOVIR 400 MG: 800 TABLET ORAL at 07:42

## 2025-04-24 RX ADMIN — VANCOMYCIN HYDROCHLORIDE 750 MG: 750 INJECTION, SOLUTION INTRAVENOUS at 23:20

## 2025-04-24 RX ADMIN — INSULIN LISPRO 12 UNITS: 100 INJECTION, SOLUTION INTRAVENOUS; SUBCUTANEOUS at 07:42

## 2025-04-24 RX ADMIN — CITALOPRAM HYDROBROMIDE 40 MG: 20 TABLET ORAL at 07:42

## 2025-04-24 RX ADMIN — GUAIFENESIN AND DEXTROMETHORPHAN 10 ML: 100; 10 SYRUP ORAL at 05:47

## 2025-04-24 RX ADMIN — LOSARTAN POTASSIUM 100 MG: 50 TABLET, FILM COATED ORAL at 07:42

## 2025-04-24 RX ADMIN — FLUTICASONE FUROATE AND VILANTEROL TRIFENATATE 1 PUFF: 200; 25 POWDER RESPIRATORY (INHALATION) at 07:42

## 2025-04-24 RX ADMIN — ACETAMINOPHEN 650 MG: 325 TABLET, FILM COATED ORAL at 01:36

## 2025-04-24 RX ADMIN — PREDNISONE 5 MG: 5 TABLET ORAL at 07:42

## 2025-04-24 RX ADMIN — INSULIN GLARGINE 14 UNITS: 100 INJECTION, SOLUTION SUBCUTANEOUS at 01:36

## 2025-04-24 RX ADMIN — EZETIMIBE 10 MG: 10 TABLET ORAL at 00:45

## 2025-04-24 RX ADMIN — PANTOPRAZOLE SODIUM 40 MG: 40 TABLET, DELAYED RELEASE ORAL at 05:47

## 2025-04-24 RX ADMIN — GABAPENTIN 600 MG: 300 CAPSULE ORAL at 07:42

## 2025-04-24 RX ADMIN — GUAIFENESIN 600 MG: 600 TABLET ORAL at 17:10

## 2025-04-24 RX ADMIN — CEFEPIME 2000 MG: 2 INJECTION, POWDER, FOR SOLUTION INTRAVENOUS at 22:01

## 2025-04-24 RX ADMIN — FOLIC ACID TAB 400 MCG 800 MCG: 400 TAB at 07:42

## 2025-04-24 RX ADMIN — ASPIRIN 81 MG: 81 TABLET, CHEWABLE ORAL at 07:41

## 2025-04-24 RX ADMIN — VANCOMYCIN HYDROCHLORIDE 2000 MG: 1 INJECTION, POWDER, LYOPHILIZED, FOR SOLUTION INTRAVENOUS at 00:45

## 2025-04-24 RX ADMIN — GUAIFENESIN 600 MG: 600 TABLET ORAL at 07:42

## 2025-04-24 RX ADMIN — INSULIN LISPRO 12 UNITS: 100 INJECTION, SOLUTION INTRAVENOUS; SUBCUTANEOUS at 17:10

## 2025-04-24 RX ADMIN — BENZONATATE 200 MG: 100 CAPSULE ORAL at 00:59

## 2025-04-24 RX ADMIN — AMLODIPINE BESYLATE 10 MG: 10 TABLET ORAL at 07:42

## 2025-04-24 NOTE — ASSESSMENT & PLAN NOTE
Lab Results   Component Value Date    HGBA1C 6.6 (A) 01/29/2025       Recent Labs     04/24/25  0049 04/24/25  0713   POCGLU 98 112       Blood Sugar Average: Last 72 hrs:  (P) 105

## 2025-04-24 NOTE — ASSESSMENT & PLAN NOTE
Known to Dr. Mejia, patient on ibrutinib and prednisone daily  Also receiving IVIG and Gazyva every 4 weeks  Last dose of IVIG 4/11/2024

## 2025-04-24 NOTE — ASSESSMENT & PLAN NOTE
Patient with documented history of chronic bronchitis/COPD  FEV1/FVC 73%, FEV1 3.28 L/89%, FVC 4.47 L/92%.  TLC 97%, %.  DLCO 76%  Home inhaler, Dulera and albuterol MDI as needed

## 2025-04-24 NOTE — ASSESSMENT & PLAN NOTE
Multiple hospitalizations and antibiotic therapy for treatment of multifocal pneumonia in an immunocompromised patient.  Prior hospitalization sputum culture positive for H. influenzae.  Completed 7 days of cefuroxime and 5 days of doxycycline.  Patient discharged and returned with fever, worsening of his shortness of breath, and productive cough  CT chest w co showing irregular lower lobe opacities, bronchial wall thickening and mucous plugging in the lower lobes  Currently on cefepime and vancomycin   Repeat sputum culture pending  MRSA swab ordered  Given continued symptoms despite multiple courses of antibiotics including both IV and oral, we will discuss moving forward with bronchoscopy

## 2025-04-24 NOTE — PLAN OF CARE
Problem: PAIN - ADULT  Goal: Verbalizes/displays adequate comfort level or baseline comfort level  Description: Interventions:- Encourage patient to monitor pain and request assistance- Assess pain using appropriate pain scale- Administer analgesics based on type and severity of pain and evaluate response- Implement non-pharmacological measures as appropriate and evaluate response- Consider cultural and social influences on pain and pain management- Notify physician/advanced practitioner if interventions unsuccessful or patient reports new pain  Outcome: Progressing     Problem: INFECTION - ADULT  Goal: Absence or prevention of progression during hospitalization  Description: INTERVENTIONS:- Assess and monitor for signs and symptoms of infection- Monitor lab/diagnostic results- Monitor all insertion sites, i.e. indwelling lines, tubes, and drains- Monitor endotracheal if appropriate and nasal secretions for changes in amount and color- [place] appropriate cooling/warming therapies per order- Administer medications as ordered- Instruct and encourage patient and family to use good hand hygiene technique- Identify and instruct in appropriate isolation precautions for identified infection/condition  Outcome: Progressing  Goal: Absence of fever/infection during neutropenic period  Description: INTERVENTIONS:- Monitor WBC  Outcome: Progressing     Problem: SAFETY ADULT  Goal: Patient will remain free of falls  Description: INTERVENTIONS:- Educate patient/family on patient safety including physical limitations- Instruct patient to call for assistance with activity - Consult OT/PT to assist with strengthening/mobility - Keep Call bell within reach- Keep bed low and locked with side rails adjusted as appropriate- Keep care items and personal belongings within reach- Initiate and maintain comfort rounds- Make Fall Risk Sign visible to staff- Offer Toileting every 2 Hours, in advance of need- Initiate/Maintain bed alarm-  Obtain necessary fall risk management equipment - Apply yellow socks and bracelet for high fall risk patients- Consider moving patient to room near nurses station  Outcome: Progressing     Problem: DISCHARGE PLANNING  Goal: Discharge to home or other facility with appropriate resources  Description: INTERVENTIONS:- Identify barriers to discharge w/patient and caregiver- Arrange for needed discharge resources and transportation as appropriate- Identify discharge learning needs (meds, wound care, etc.)- Arrange for interpretive services to assist at discharge as needed- Refer to Case Management Department for coordinating discharge planning if the patient needs post-hospital services based on physician/advanced practitioner order or complex needs related to functional status, cognitive ability, or social support system  Outcome: Progressing     Problem: Knowledge Deficit  Goal: Patient/family/caregiver demonstrates understanding of disease process, treatment plan, medications, and discharge instructions  Description: Complete learning assessment and assess knowledge base.Interventions:- Provide teaching at level of understanding- Provide teaching via preferred learning methods  Outcome: Progressing     Problem: RESPIRATORY - ADULT  Goal: Achieves optimal ventilation and oxygenation  Description: INTERVENTIONS:- Assess for changes in respiratory status- Assess for changes in mentation and behavior- Position to facilitate oxygenation and minimize respiratory effort- Oxygen administered by appropriate delivery if ordered- Initiate smoking cessation education as indicated- Encourage broncho-pulmonary hygiene including cough, deep breathe, Incentive Spirometry- Assess the need for suctioning and aspirate as needed- Assess and instruct to report SOB or any respiratory difficulty- Respiratory Therapy support as indicated  Outcome: Progressing     Problem: METABOLIC, FLUID AND ELECTROLYTES - ADULT  Goal: Electrolytes  maintained within normal limits  Description: INTERVENTIONS:- Monitor labs and assess patient for signs and symptoms of electrolyte imbalances- Administer electrolyte replacement as ordered- Monitor response to electrolyte replacements, including repeat lab results as appropriate- Instruct patient on fluid and nutrition as appropriate  Outcome: Progressing  Goal: Glucose maintained within target range  Description: INTERVENTIONS:- Monitor Blood Glucose as ordered- Assess for signs and symptoms of hyperglycemia and hypoglycemia- Administer ordered medications to maintain glucose within target range- Assess nutritional intake and initiate nutrition service referral as needed  Outcome: Progressing     Problem: Nutrition/Hydration-ADULT  Goal: Nutrient/Hydration intake appropriate for improving, restoring or maintaining nutritional needs  Description: Monitor and assess patient's nutrition/hydration status for malnutrition. Collaborate with interdisciplinary team and initiate plan and interventions as ordered.  Monitor patient's weight and dietary intake as ordered or per policy. Utilize nutrition screening tool and intervene as necessary. Determine patient's food preferences and provide high-protein, high-caloric foods as appropriate. INTERVENTIONS:- Monitor oral intake, urinary output, labs, and treatment plans- Assess nutrition and hydration status and recommend course of action- Evaluate amount of meals eaten- Assist patient with eating if necessary - Allow adequate time for meals- Recommend/ encourage appropriate diets, oral nutritional supplements, and vitamin/mineral supplements- Order, calculate, and assess calorie counts as needed- Recommend, monitor, and adjust tube feedings and TPN/PPN based on assessed needs- Assess need for intravenous fluids- Provide specific nutrition/hydration education as appropriate- Include patient/family/caregiver in decisions related to nutrition  Outcome: Progressing

## 2025-04-24 NOTE — CASE MANAGEMENT
Case Management Assessment & Discharge Planning Note    Patient name Cayetano Lucero  Location [facility] /E5 -* MRN [id number]  : 1955 Date 2025       Current Admission Date: 2025  Current Admission Diagnosis:Multifocal pneumonia   Patient Active Problem List    Diagnosis Date Noted Date Diagnosed    Dyspnea on exertion 2025     Pulmonary nodule 04/10/2025     Sepsis without acute organ dysfunction (HCC) 2025     Left lower lobe pulmonary infiltrate 2025     S/P repair of paraesophageal hernia 2024     Low serum IgG for age 04/10/2024     Drug-induced osteoporosis 04/10/2024     Port-A-Cath in place 2024     Type 2 diabetes mellitus with diabetic polyneuropathy, with long-term current use of insulin ([facility]) 2024     Preop cardiovascular exam 2023     Cancer related pain 2023     Long term (current) use of systemic steroids 2022     Multifocal pneumonia 2022     Depression, recurrent ([facility]) 03/15/2021     Chronic obstructive pulmonary disease ([facility]) 2020     Autoimmune hemolytic anemia ([facility]) 2019     Right upper quadrant abdominal pain 2019     Hypogammaglobulinemia, acquired ([facility]) 04/10/2019     Acute bursitis of right shoulder 2018     Esophageal dysphagia 10/11/2018     Esophageal stricture 2018     Dysphagia 2018     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     Chronic right shoulder pain 2018     Type 2 diabetes mellitus, with long-term current use of insulin ([facility]) 2018     Listeria bacteremia 2018     Colitis, acute 2018     Esophageal reflux 2018     Headache around the eyes 10/30/2017     Pancytopenia (HCC) 10/28/2017     Cellulitis of head or scalp 10/08/2017     Leukopenia due to antineoplastic chemotherapy ([facility]) 2017     Hyperglycemia 2017     Ulcer of esophagus without bleeding 2017     Chronic kidney disease, stage II (mild) 2017      Anemia, chronic disease 03/10/2017     Hemolytic anemia (HCC) 03/06/2017     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) 03/06/2017     H/O blood clots      Candida esophagitis (HCC) 01/18/2017     CLL (chronic lymphocytic leukemia) (HCC)      Hyperlipidemia      Hypertension      History of TIA (transient ischemic attack)      Esophagitis, reflux 04/16/2014     Thrombocytopenia (HCC) 05/01/2013     Chronic lymphocytic leukemia (HCC) 04/18/2013     CAD (coronary artery disease) 01/01/2004       LOS (days): 1  Geometric Mean LOS (GMLOS) (days): 2.8  Days to GMLOS:2.3     OBJECTIVE:  PATIENT READMITTED TO HOSPITAL  Risk of Unplanned Readmission Score: 17.29         Current admission status: Inpatient       Preferred Pharmacy:   [id number] - [place] - [address]  [address] [place] [address]  Phone: [phone] Fax: [phone]    [facility] - [address] [address]  Phone: [phone] Fax: [phone]    [facility] - [place], [place] - [address]  [address]  [place] [address]  Phone: [phone] Fax: [phone]    [facility] - [place], [place] - [id number]  [address]  [address]  [address]  [place] [address]  Phone: [phone] Fax: [phone]    [facility] - [place], [place] -  [address]   [address]  [address]  [place] [address]  Phone: [phone] Fax: [phone]    PATIENT/FAMILY REPORTS NO PREFERRED PHARMACY  No address on file      Primary Care Provider: Meseret Elliott DO    Primary Insurance: MEDICARE  Secondary Insurance: BLUE CROSS    ASSESSMENT:  Active Health Care Proxies       Danii Lucero Health Care Representative - Spouse   Primary Phone: [phone] (Mobile)                 Advance Directives  Does patient have a Health Care POA?: Yes  Primary Contact: Spouse Danii, [phone]    Readmission [facility]  30 Day  Readmission: Yes  During your hospital stay, did someone (provider, nurse, ) explain your care to you in a way you could understand?: Unable to Assess  Did you feel medically stable to leave the hospital?: Unable to Assess  Were you able to pay for your medication at the pharmacy?: Yes  Did you have reliable transportation to take you to your appointments?: Yes  During previous admission, was a post-acute recommendation made?: No  Patient was readmitted due to: Multifocal Pneumonia    Patient Information  Admitted from:: Home  Mental Status: Alert  During Assessment patient was accompanied by: Not accompanied during assessment  Assessment information provided by:: Spouse  Primary Caregiver: Self  Support Systems: Self, Spouse/significant other  What city do you live in?: [place]  Home entry access options. Select all that apply.: Stairs  Number of steps to enter home.: 2  Type of Current Residence: 2 story home  Upon entering residence, is there a bedroom on the main floor (no further steps)?: No  A bedroom is located on the following floor levels of residence (select all that apply):: 2nd Floor  Upon entering residence, is there a bathroom on the main floor (no further steps)?: Yes (1/2 bath)  Number of steps to 2nd floor from main floor: One Flight  Living Arrangements: Lives w/ Spouse/significant other    Activities of Daily Living Prior to Admission  Functional Status: Independent  Completes ADLs independently?: Yes  Ambulates independently?: Yes  Does patient use assisted devices?: No  Does patient currently own DME?: No  Does patient have a history of Outpatient Therapy (PT/OT)?: No  Does the patient have a history of Short-Term Rehab?: No  Does patient have a history of HHC?: No  Does patient currently have HHC?: No    Patient Information Continued  Does patient have prescription coverage?: Yes  Can the patient afford their medications and any related supplies (such as glucometers or test  strips)?: Yes  Does patient receive dialysis treatments?: No  Does patient have a history of substance abuse?: No  Does patient have a history of Mental Health Diagnosis?: Yes (depression)  Has patient received inpatient treatment related to mental health in the last 2 years?: No    Means of Transportation  Means of Transport to [facility]:: Drives Self          DISCHARGE DETAILS:    Discharge planning discussed with:: patient's spouse Danii KIRK contacted family/caregiver?: Yes     Contacts  Patient Contacts: Danii Lucero (Spouse) [phone] (B)  Relationship to Patient:: Family  Contact Method: Phone  Phone Number: Danii Lucero (Spouse) [phone] (Z)  Reason/Outcome: Continuity of Care, Emergency Contact, Discharge Planning       Additional Comments: 30 day readmission, multifocal pneumonia. Patient is IPTA, no DME. Lives w/ spouse 2 fl 2 kishor home. Does not wear oxygen at baseline. Spouse can transport at discharge.

## 2025-04-24 NOTE — PLAN OF CARE
Problem: PAIN - ADULT  Goal: Verbalizes/displays adequate comfort level or baseline comfort level  Description: Interventions:- Encourage patient to monitor pain and request assistance- Assess pain using appropriate pain scale- Administer analgesics based on type and severity of pain and evaluate response- Implement non-pharmacological measures as appropriate and evaluate response- Consider cultural and social influences on pain and pain management- Notify physician/advanced practitioner if interventions unsuccessful or patient reports new pain  Outcome: Progressing     Problem: INFECTION - ADULT  Goal: Absence or prevention of progression during hospitalization  Description: INTERVENTIONS:- Assess and monitor for signs and symptoms of infection- Monitor lab/diagnostic results- Monitor all insertion sites, i.e. indwelling lines, tubes, and drains- Monitor endotracheal if appropriate and nasal secretions for changes in amount and color- [place] appropriate cooling/warming therapies per order- Administer medications as ordered- Instruct and encourage patient and family to use good hand hygiene technique- Identify and instruct in appropriate isolation precautions for identified infection/condition  Outcome: Progressing  Goal: Absence of fever/infection during neutropenic period  Description: INTERVENTIONS:- Monitor WBC  Outcome: Progressing     Problem: SAFETY ADULT  Goal: Patient will remain free of falls  Description: INTERVENTIONS:- Educate patient/family on patient safety including physical limitations- Instruct patient to call for assistance with activity - Consult OT/PT to assist with strengthening/mobility - Keep Call bell within reach- Keep bed low and locked with side rails adjusted as appropriate- Keep care items and personal belongings within reach- Initiate and maintain comfort rounds- Make Fall Risk Sign visible to staff- Offer Toileting every 2 Hours, in advance of need- Initiate/Maintain bed alarm-  Obtain necessary fall risk management equipment - Apply yellow socks and bracelet for high fall risk patients- Consider moving patient to room near nurses station  Outcome: Progressing     Problem: DISCHARGE PLANNING  Goal: Discharge to home or other facility with appropriate resources  Description: INTERVENTIONS:- Identify barriers to discharge w/patient and caregiver- Arrange for needed discharge resources and transportation as appropriate- Identify discharge learning needs (meds, wound care, etc.)- Arrange for interpretive services to assist at discharge as needed- Refer to Case Management Department for coordinating discharge planning if the patient needs post-hospital services based on physician/advanced practitioner order or complex needs related to functional status, cognitive ability, or social support system  Outcome: Progressing     Problem: Knowledge Deficit  Goal: Patient/family/caregiver demonstrates understanding of disease process, treatment plan, medications, and discharge instructions  Description: Complete learning assessment and assess knowledge base.Interventions:- Provide teaching at level of understanding- Provide teaching via preferred learning methods  Outcome: Progressing     Problem: RESPIRATORY - ADULT  Goal: Achieves optimal ventilation and oxygenation  Description: INTERVENTIONS:- Assess for changes in respiratory status- Assess for changes in mentation and behavior- Position to facilitate oxygenation and minimize respiratory effort- Oxygen administered by appropriate delivery if ordered- Initiate smoking cessation education as indicated- Encourage broncho-pulmonary hygiene including cough, deep breathe, Incentive Spirometry- Assess the need for suctioning and aspirate as needed- Assess and instruct to report SOB or any respiratory difficulty- Respiratory Therapy support as indicated  Outcome: Progressing     Problem: METABOLIC, FLUID AND ELECTROLYTES - ADULT  Goal: Electrolytes  maintained within normal limits  Description: INTERVENTIONS:- Monitor labs and assess patient for signs and symptoms of electrolyte imbalances- Administer electrolyte replacement as ordered- Monitor response to electrolyte replacements, including repeat lab results as appropriate- Instruct patient on fluid and nutrition as appropriate  Outcome: Progressing  Goal: Glucose maintained within target range  Description: INTERVENTIONS:- Monitor Blood Glucose as ordered- Assess for signs and symptoms of hyperglycemia and hypoglycemia- Administer ordered medications to maintain glucose within target range- Assess nutritional intake and initiate nutrition service referral as needed  Outcome: Progressing     Problem: Nutrition/Hydration-ADULT  Goal: Nutrient/Hydration intake appropriate for improving, restoring or maintaining nutritional needs  Description: Monitor and assess patient's nutrition/hydration status for malnutrition. Collaborate with interdisciplinary team and initiate plan and interventions as ordered.  Monitor patient's weight and dietary intake as ordered or per policy. Utilize nutrition screening tool and intervene as necessary. Determine patient's food preferences and provide high-protein, high-caloric foods as appropriate. INTERVENTIONS:- Monitor oral intake, urinary output, labs, and treatment plans- Assess nutrition and hydration status and recommend course of action- Evaluate amount of meals eaten- Assist patient with eating if necessary - Allow adequate time for meals- Recommend/ encourage appropriate diets, oral nutritional supplements, and vitamin/mineral supplements- Order, calculate, and assess calorie counts as needed- Recommend, monitor, and adjust tube feedings and TPN/PPN based on assessed needs- Assess need for intravenous fluids- Provide specific nutrition/hydration education as appropriate- Include patient/family/caregiver in decisions related to nutrition  Outcome: Progressing

## 2025-04-24 NOTE — H&P
"H&P - Hospitalist   Name: Cayetano Lucero 69 y.o. male I MRN: [id number]  Unit/Bed#: E5 -01 I Date of Admission: 4/23/2025   Date of Service: 4/24/2025 I Hospital Day: 1     Assessment & Plan  Multifocal pneumonia  Pt presented to the ED secondary to episode of chills with worsening shortness of breath and productive cough. Pt currently on RA.  Of note, pt recently hospitalized for this issue 4/9-4/13. Treated with IV ceftriaxone x 4 days and transitioned to oral cefuroxime to complete 7 day course. Also treated with doxy x 5 days.   CT chest today demonstrating:  Irregular lower lobe opacities in the central, partially groundglass opacity in the right upper lobe, increased from 4/9/2025. These findings likely represent worsening multifocal pneumonia. Other considerations include pneumonitis.   Some reticular opacities in the lower lobes and in the right upper lobe... may indicate a component of underlying interstitial lung disease. No honeycombing.   Bronchial wall thickening with some mucous plugging in the lower lobes, likely also related to the aforementioned process.   WBC 8.85, not fulfilling SIRS criteria  Procalc pending   Will treat with broad ABX for now given worsening and immunocompromised state, IV cefepime + vancomycin  Pulm consult pending   CLL (chronic lymphocytic leukemia) (HCC)  Known to Dr. Mejia, patient on ibrutinib and prednisone daily  Also receiving IVIG and Gazyva every 4 weeks  Continue outpatient follow-up with hem/onc, last seen 4/16  Chronic obstructive pulmonary disease (HCC)  No acute exacerbation  Continue home inhaler regimen  Hyperlipidemia  Continue zetia  Hypertension  Continue home regimen of amlodipine, HCTZ, and losartan  Type 2 diabetes mellitus, with long-term current use of insulin (HCC)  Lab Results   Component Value Date    HGBA1C 6.6 (A) 01/29/2025       No results for input(s): \"POCGLU\" in the last 72 hours.    Blood Sugar Average: Last 72 hrs:  Continue 18 units " "of Lantus hs 12 units lispro tid with meals  SSI with accucheks    Pulmonary nodule  CT chest demonstrating \"7 mm left upper lobe groundglass nodule with no associated solid component, increased from 4 mm in 2022. This may represent an adenocarcinoma spectrum lesion. Follow-up noncontrast chest CT is recommended in 12 months.\"  Outpatient follow-up  Esophageal reflux  CT chest demonstrating \"Circumferential wall thickening of the distal esophagus, which may be related to esophagitis or gastroesophageal reflux.\"  Continue PPI      VTE Pharmacologic Prophylaxis: VTE Score: 4 Moderate Risk (Score 3-4) - Pharmacological DVT Prophylaxis Contraindicated. Sequential Compression Devices Ordered.  Code Status: Level 1 - Full Code   Discussion with family: Patient declined call to .     Anticipated Length of Stay: Patient will be admitted on an inpatient basis with an anticipated length of stay of greater than 2 midnights secondary to multifocal pneumonia.    History of Present Illness   Chief Complaint: \"I started feeling better, but this morning I got the chills and felt worse again\"    Cayetano Lucero is a 69 y.o. male who presents with worsening multifocal pneumonia.  Patient presented to the ED secondary to worsening shortness of breath and productive cough.  He reports that he was admitted earlier this month for the same issues and received IV ceftriaxone and doxycycline.  He reports that 2 days ago he started to feel better, but then this morning he developed severe chills and again with worsening shortness of breath and productive cough prompting him to come to the ED for further evaluation.  He denies any known fevers at home.  He denies any other symptoms such as chest pain, palpitations, abdominal pain, nausea, diaphoresis, neuro or urinary symptoms.    Review of Systems   Constitutional:  Positive for chills and fatigue. Negative for appetite change, diaphoresis and fever.   HENT:  Positive for " congestion. Negative for rhinorrhea and sore throat.    Eyes:  Negative for photophobia and visual disturbance.   Respiratory:  Positive for cough and shortness of breath. Negative for wheezing.    Cardiovascular:  Negative for chest pain, palpitations and leg swelling.   Gastrointestinal:  Negative for abdominal distention, abdominal pain, blood in stool, constipation, diarrhea, nausea and vomiting.   Genitourinary:  Negative for dysuria and hematuria.   Musculoskeletal:  Negative for arthralgias, back pain, myalgias and neck stiffness.   Skin:  Negative for color change and rash.   Neurological:  Negative for dizziness, seizures, syncope, weakness, light-headedness and headaches.   Psychiatric/Behavioral:  Negative for agitation, behavioral problems and confusion. The patient is not nervous/anxious.    All other systems reviewed and are negative.      Historical Information   Past Medical History:   Diagnosis Date    Allergic     Anemia     Arthritis     CAD (coronary artery disease) 2004    Cancer (HCC)     Leukemia    Cellulitis of scalp     CKD (chronic kidney disease) stage 3, GFR 30-59 ml/min (HCC)     CLL (chronic lymphocytic leukemia) (HCC)     COPD (chronic obstructive pulmonary disease) (HCC)     Diabetes mellitus (HCC)     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    Dyslipidemia     Edema extremities     Esophageal reflux 1/31/2018    Added automatically from request for surgery [id number]    Esophageal ulcer     H/O blood clots     Hemolytic anemia (HCC)     Hiatal hernia     History of TIA (transient ischemic attack)     Hyperlipidemia     Hypertension     Listeria infection 2018    Multiple gastric ulcers     Myocardial infarction (HCC) 2004    acute    Neutropenia (HCC)     Obese     Positive QuantiFERON-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 08/21/2017    Recurrent sinusitis     Thrombocytopenia (HCC)     Vertigo      Past Surgical History:   Procedure Laterality Date    ARTHROSCOPIC REPAIR ACL      lt knee    CARDIAC SURGERY      cardiac cath with  stent    FOOT SURGERY      IR PORT CHECK  5/17/2019    KNEE ARTHROSCOPY      OTHER SURGICAL HISTORY      cardiac cath lesion 1, 1st adjunct treat device: stent    PORTACATH PLACEMENT      PA ESOPHAGOGASTRODUODENOSCOPY TRANSORAL DIAGNOSTIC N/A 10/5/2017    Procedure: ESOPHAGOGASTRODUODENOSCOPY (EGD) with bx;  Surgeon: Winifred Hansen DO;  Location: [facility];  Service: Gastroenterology    PA ESOPHAGOGASTRODUODENOSCOPY TRANSORAL DIAGNOSTIC N/A 3/8/2018    Procedure: ESOPHAGOGASTRODUODENOSCOPY (EGD) with biopsy;  Surgeon: Winifred Hansen DO;  Location: [facility];  Service: Gastroenterology    PA ESOPHAGOGASTRODUODENOSCOPY TRANSORAL DIAGNOSTIC N/A 6/20/2018    Procedure: ESOPHAGOGASTRODUODENOSCOPY (EGD) with Dilation;  Surgeon: Winifred Hansen DO;  Location: [facility];  Service: Gastroenterology    PA ESOPHAGOGASTRODUODENOSCOPY TRANSORAL DIAGNOSTIC N/A 10/18/2018    Procedure: ESOPHAGOGASTRODUODENOSCOPY (EGD) with dilation;  Surgeon: Edu Mejía MD;  Location: [facility];  Service: Gastroenterology    PA ESOPHAGOGASTRODUODENOSCOPY TRANSORAL DIAGNOSTIC N/A 6/7/2024    Procedure: ESOPHAGOGASTRODUODENOSCOPY (EGD);  Surgeon: Immanuel Gonsales MD;  Location: [facility];  Service: Thoracic    PA ESOPHAGOSCOPY FLEXIBLE TRANSORAL WITH BIOPSY N/A 9/2/2016    Procedure: ESOPHAGOGASTRODUODENOSCOPY (EGD); ESOPHAGEAL DILATION; ESOPHAGEAL BIOPSY;  Surgeon: Abdi Holcomb MD;  Location: [facility];  Service: Thoracic    PA LAPS RPR PARAESPHGL HRNA INCL FUNDPLSTY W/O MESH N/A 6/7/2024    Procedure: REPAIR HERNIA PARAESOPHAGEAL LAPAROSCOPIC W ROBOTICS TYPE 3. PARTIAL FUNDOPLICATION. MESH.;  Surgeon: Immanuel Gonsales MD;  Location: [facility];  Service: Thoracic    TONSILLECTOMY      UPPER GASTROINTESTINAL ENDOSCOPY      x 6     Social History     Tobacco Use    Smoking status: Former     Current packs/day: 0.00     Average packs/day: 2.0 packs/day for 33.0 years (66.0 ttl pk-yrs)     Types: Cigarettes     Start date: 1978      Quit date:      Years since quittin.3     Passive exposure: Past    Smokeless tobacco: Never   Vaping Use    Vaping status: Never Used   Substance and Sexual Activity    Alcohol use: Yes     Alcohol/week: 2.0 standard drinks of alcohol     Types: 2 Cans of beer per week     Comment: social use as per [employer]    Drug use: Never    Sexual activity: Not on file     E-Cigarette/Vaping    E-Cigarette Use Never User      E-Cigarette/Vaping Substances    Nicotine No     THC No     CBD No     Flavoring No     Other No     Unknown No      Family history non-contributory  Social History:  Marital Status: /Civil Union   Patient Pre-hospital Living Situation: Home  Patient Pre-hospital Level of Mobility: walks  Patient Pre-hospital Diet Restrictions: diabetic    Meds/Allergies   I have reviewed home medications with patient personally.  Prior to Admission medications    Medication Sig Start Date End Date Taking? Authorizing Provider   albuterol (2.5 mg/3 mL) 0.083 % nebulizer solution Take 3 mL (2.5 mg total) by nebulization every 6 (six) hours as needed for wheezing or shortness of breath 22  Yes Meseret Elliott DO   albuterol (Ventolin HFA) 90 mcg/act inhaler USE 2 INHALATIONS EVERY 6 HOURS AS NEEDED FOR WHEEZING 24  Yes Meseret Elliott DO   amLODIPine (NORVASC) 10 mg tablet TAKE 1 TABLET DAILY 24  Yes Momo Ba MD   aspirin 81 MG tablet Take 81 mg by mouth daily Every other day   Yes Historical Provider, MD   benzonatate (TESSALON) 200 MG capsule Take 1 capsule (200 mg total) by mouth 3 (three) times a day as needed for cough 25  Yes Christina Johnson PA-C   Blood Glucose Monitoring Suppl (FreeStyle Freedom Lite) w/Device KIT Use 3 (three) times a day 23  Yes Halie No MD   citalopram (CeleXA) 40 mg tablet TAKE 1 TABLET DAILY 25  Yes Meseret Elliott DO   Continuous Blood Gluc  (FreeStyle Ashley 2 Wiley) JOE Use to scan sensor premeals and at bedtime 23   Yes Halie No MD   Diclofenac Sodium (VOLTAREN) 1 % Apply 4 g topically 4 (four) times a day 6/9/23  Yes Liane Bishop PA-C   ezetimibe (ZETIA) 10 mg tablet Take 1 tablet (10 mg total) by mouth daily at bedtime 4/13/25  Yes Christina Johnson PA-C   folic acid (FOLVITE) 1 mg tablet Take 800 mcg by mouth daily    Yes Rigoberto Conteh MD   gabapentin (NEURONTIN) 600 MG tablet TAKE 1 TABLET DAILY 1/21/25  Yes Meseret Elliott DO   glucose blood (FREESTYLE LITE) test strip Check blood sugar 3 times a day 12/28/23  Yes Catalina Randolph PA-C   hydroCHLOROthiazide 12.5 mg tablet Take 1 tablet (12.5 mg total) by mouth daily Do not start before April 14, 2025. 4/14/25  Yes Christina Johnson PA-C   Ibrutinib 140 MG CAPS Take 1 capsule (140 mg total) by mouth daily 3/13/25  Yes Gideon Mejia MD   Injection Device for Insulin (CeQur Simplicity 2U) JOE Use 1 patch every 3 days, disp 1 box of 10 patches for 30 days supply 4/15/24  Yes Catalina Randolph PA-C   Injection Device for Insulin (CeQur Simplicity ) MISC Use to insert simplicity device. 4/15/24  Yes Catalina Randolph PA-C   insulin degludec (Tresiba FlexTouch) 100 units/mL injection pen Inject 18 Units under the skin daily at bedtime 12/12/24  Yes Halie No MD   insulin lispro (Admelog SoloStar) 100 units/mL injection pen 12-14 units before meals 10/3/24  Yes Halie No MD   Insulin Pen Needle (BD Pen Needle Inez U/F) 32G X 4 MM MISC Use 3 (three) times a day 12/12/24  Yes Halie No MD   Lancets (FREESTYLE) lancets Use as instructed--test 2 times a day    E 11.9 9/20/17  Yes Kevon Vidales MD   Lancets (freestyle) lancets TEST BLOOD SUGAR 3 TIMES A DAY 8/1/23  Yes Halie No MD   LORazepam (ATIVAN) 0.5 mg tablet Take 1 tablet (0.5 mg total) by mouth daily as needed for anxiety 10/3/24  Yes Meseret Elliott DO   losartan (COZAAR) 100 MG tablet TAKE 1 TABLET DAILY 11/5/24  Yes Halie No MD   mometasone-formoterol (Dulera) 200-5 MCG/ACT  inhaler Inhale 2 puffs 2 (two) times a day Rinse mouth after use. 2/25/22  Yes Francesca Monroy MD   multivitamin (THERAGRAN) TABS Take 1 tablet by mouth daily   Yes Historical Provider, MD   potassium chloride (Klor-Con M20) 20 mEq tablet Take 1 tablet (20 mEq total) by mouth daily Do not start before April 14, 2025. 4/14/25  Yes Christina Johnson PA-C   predniSONE 5 mg tablet Take 1 tablet (5 mg total) by mouth daily 3/24/25  Yes Gideon Mejia MD   acyclovir (ZOVIRAX) 400 MG tablet TAKE 1 TABLET TWICE A DAY 8/28/23 4/9/25  Gideon Mejia MD   Continuous Glucose Sensor (FreeStyle Ashley 2 Sensor) MISC Apply 1 sensor every 14 days. Use as directed with Freestyle Ashley 2 reader. 8/15/24   Halie No MD   naloxone (NARCAN) 4 mg/0.1 mL nasal spray Administer 1 spray into a nostril. If no response after 2-3 minutes, give another dose in the other nostril using a new spray. 11/20/24 11/20/25  Lyndsay Siegel PA-C   obinutuzumab (GAZYVA) 1000 mg/40 mL SOLN Infuse into a venous catheter    Historical Provider, MD   oxyCODONE (ROXICODONE) 10 MG TABS Take 1 tablet (10 mg total) by mouth every 6 (six) hours as needed for moderate pain Max Daily Amount: 40 mg 11/20/24   Lyndsay Siegel PA-C   Tresiba FlexTouch 100 units/mL injection pen INJECT 12 UNITS SUB-Q DAILY AS DIRECTED. 12/12/24   Halie No MD   Vonoprazan Fumarate 20 MG TABS Take 20 mg by mouth in the morning 4/24/24   Winifred Hansen,      Allergies   Allergen Reactions    Heparin Other (See Comments)     Other reaction(s): Blood Disorders  clot    Macrolides And Ketolides Other (See Comments)     Interacts with other meds he is taking    Simvastatin Myalgia       Objective :  Temp:  [97.4 °F (36.3 °C)-100.3 °F (37.9 °C)] 97.4 °F (36.3 °C)  HR:  [82-94] 82  BP: (108-143)/(58-80) 130/58  Resp:  [19-22] 20  SpO2:  [91 %-98 %] 98 %  O2 Device: None (Room air)    Physical Exam  Vitals and nursing note reviewed.   Constitutional:        General: He is not in acute distress.     Appearance: He is well-developed.   HENT:      Head: Normocephalic and atraumatic.      Nose: Nose normal. No congestion.      Mouth/Throat:      Mouth: Mucous membranes are moist.      Pharynx: Oropharynx is clear.   Eyes:      Conjunctiva/sclera: Conjunctivae normal.   Cardiovascular:      Rate and Rhythm: Normal rate and regular rhythm.      Heart sounds: Normal heart sounds. No murmur heard.     No friction rub. No gallop.   Pulmonary:      Effort: Pulmonary effort is normal. No respiratory distress.      Breath sounds: Rhonchi present. No wheezing or rales.   Abdominal:      General: Bowel sounds are normal. There is no distension.      Palpations: Abdomen is soft.      Tenderness: There is no abdominal tenderness.   Musculoskeletal:      Cervical back: Neck supple.      Right lower leg: No edema.      Left lower leg: No edema.   Skin:     General: Skin is warm and dry.      Capillary Refill: Capillary refill takes less than 2 seconds.   Neurological:      General: No focal deficit present.      Mental Status: He is alert and oriented to person, place, and time. Mental status is at baseline.   Psychiatric:         Mood and Affect: Mood normal.         Behavior: Behavior normal.          Lines/Drains:      Central Line:  Goal for removal: N/A - Chronic PICC             Lab Results: I have reviewed the following results:  Results from last 7 days   Lab Units 04/23/25  1756   WBC Thousand/uL 8.85   HEMOGLOBIN g/dL 13.9   HEMATOCRIT % 42.8   PLATELETS Thousands/uL 184   LYMPHO PCT % 2*   MONO PCT % 12   EOS PCT % 0     Results from last 7 days   Lab Units 04/23/25  1756   SODIUM mmol/L 140   POTASSIUM mmol/L 3.8   CHLORIDE mmol/L 104   CO2 mmol/L 27   BUN mg/dL 16   CREATININE mg/dL 1.02   ANION GAP mmol/L 9   CALCIUM mg/dL 9.3   ALBUMIN g/dL 4.4   TOTAL BILIRUBIN mg/dL 1.08*   ALK PHOS U/L 42   ALT U/L 28   AST U/L 26   GLUCOSE RANDOM mg/dL 125             Lab Results    Component Value Date    HGBA1C 6.6 (A) 01/29/2025    HGBA1C 6.4 08/15/2024    HGBA1C 6.8 (H) 04/16/2024           Imaging Results Review: I reviewed radiology reports from this admission including: CT chest.  Other Study Results Review: No additional pertinent studies reviewed.    Administrative Statements   I have spent a total time of 65 minutes in caring for this patient on the day of the visit/encounter including Diagnostic results, Impressions, Counseling / Coordination of care, Documenting in the medical record, Reviewing/placing orders in the medical record (including tests, medications, and/or procedures), Obtaining or reviewing history  , and Communicating with other healthcare professionals .    ** Please Note: This note has been constructed using a voice recognition system. **

## 2025-04-24 NOTE — ASSESSMENT & PLAN NOTE
"CT chest demonstrating \"7 mm left upper lobe groundglass nodule with no associated solid component, increased from 4 mm in 2022. This may represent an adenocarcinoma spectrum lesion. Follow-up noncontrast chest CT is recommended in 12 months.\"  Outpatient follow-up  "

## 2025-04-24 NOTE — ASSESSMENT & PLAN NOTE
7 mm left upper lobe groundglass nodule with no associated solid component, increased from 4 mm in 2022. Follow-up noncontrast chest CT is recommended in 12 months.

## 2025-04-24 NOTE — ASSESSMENT & PLAN NOTE
Pt presented to the ED secondary to episode of chills with worsening shortness of breath and productive cough. Pt currently on RA.  Of note, pt recently hospitalized for this issue 4/9-4/13. Treated with IV ceftriaxone x 4 days and transitioned to oral cefuroxime to complete 7 day course. Also treated with doxy x 5 days.   CT chest today demonstrating:  Irregular lower lobe opacities in the central, partially groundglass opacity in the right upper lobe, increased from 4/9/2025. These findings likely represent worsening multifocal pneumonia. Other considerations include pneumonitis.   Some reticular opacities in the lower lobes and in the right upper lobe... may indicate a component of underlying interstitial lung disease. No honeycombing.   Bronchial wall thickening with some mucous plugging in the lower lobes, likely also related to the aforementioned process.   WBC 8.85, not fulfilling SIRS criteria  Procalc pending   Will treat with broad ABX for now given worsening and immunocompromised state, IV cefepime + vancomycin  Pulm consult pending

## 2025-04-24 NOTE — ASSESSMENT & PLAN NOTE
"Lab Results   Component Value Date    HGBA1C 6.6 (A) 01/29/2025       No results for input(s): \"POCGLU\" in the last 72 hours.    Blood Sugar Average: Last 72 hrs:  Continue 18 units of Lantus hs 12 units lispro tid with meals  SSI with accucheks    "

## 2025-04-24 NOTE — CONSULTS
Consultation - Pulmonology   Name: Cayetano Lucero 69 y.o. male I MRN: [id number]  Unit/Bed#: E5 -01 I Date of Admission: 4/23/2025   Date of Service: 4/24/2025 I Hospital Day: 1       Inpatient consult to Pulmonology     Date/Time  4/24/2025 8:36 AM     Performed by  Gerard Cabrera DO   Authorized by  Salinas Carrillo PA-C           Physician Requesting Evaluation: Mayelin Reese DO   Reason for Evaluation / Principal Problem: Pneumonia, ILD    Assessment & Plan  Multifocal pneumonia  Multiple hospitalizations and antibiotic therapy for treatment of multifocal pneumonia in an immunocompromised patient.  Prior hospitalization sputum culture positive for H. influenzae.  Completed 7 days of cefuroxime and 5 days of doxycycline.  Patient discharged and returned with fever, worsening of his shortness of breath, and productive cough  CT chest w co showing irregular lower lobe opacities, bronchial wall thickening and mucous plugging in the lower lobes  Currently on cefepime and vancomycin   Repeat sputum culture pending  MRSA swab ordered  Given continued symptoms despite multiple courses of antibiotics including both IV and oral, we will discuss moving forward with bronchoscopy   CLL (chronic lymphocytic leukemia) (HCC)  Known to Dr. Mejia, patient on ibrutinib and prednisone daily  Also receiving IVIG and Gazyva every 4 weeks  Last dose of IVIG 4/11/2024  Chronic obstructive pulmonary disease (HCC)  Patient with documented history of chronic bronchitis/COPD  FEV1/FVC 73%, FEV1 3.28 L/89%, FVC 4.47 L/92%.  TLC 97%, %.  DLCO 76%  Home inhaler, Dulera and albuterol MDI as needed  Pulmonary nodule  7 mm left upper lobe groundglass nodule with no associated solid component, increased from 4 mm in 2022. Follow-up noncontrast chest CT is recommended in 12 months.   I have discussed the above management plan in detail with the primary service.     History of Present Illness   Cayetano Lucero is a 69 y.o. male  with CLL, hypogammaglobulinemia, former tobacco use (30py quit ~14 years ago) who presents with worsening shortness of breath and productive cough.    Patient evaluated in the emergency department on 3/17 discharged with doxycycline which he completed. Seen in the pulmonary office for consultation on 4/9 and was referred to ED for further workup/management. Patient admitted 4/9-4/13 for treatment of multifocal pneumonia, sputum culture + Haemophilus influenzae. Given IV ceftriaxone and doxycycline. He was transitioned to cefuroxime to complete a 7 day course and had completed 5 days of doxy. Since discharge he reports having approximately 2 days of feeling well and able to complete home chores until he is darted to develop worsening shortness of breath, productive cough, and fever.  He presented back to the emergency department.  In the ED patient had a Tmax of 100.3, he was hemodynamically stable, and was saturating appropriately on room air. Labs including CMP, CBC, procal, flu/covid/rsv were within normal limits.  Repeat sputum culture pending    Review of systems:  12 point review of systems was completed and was otherwise negative except as listed in HPI.      Historical Information   Historical Information   Past Medical History:   Diagnosis Date    Allergic     Anemia     Arthritis     CAD (coronary artery disease) 2004    Cancer (HCC)     Leukemia    Cellulitis of scalp     CKD (chronic kidney disease) stage 3, GFR 30-59 ml/min (HCC)     CLL (chronic lymphocytic leukemia) (HCC)     COPD (chronic obstructive pulmonary disease) (HCC)     Diabetes mellitus (HCC)     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    Dyslipidemia     Edema extremities     Esophageal reflux 1/31/2018    Added automatically from request for surgery [id number]    Esophageal ulcer     H/O blood clots     Hemolytic anemia (HCC)     Hiatal hernia     History of TIA (transient ischemic attack)     Hyperlipidemia     Hypertension     Listeria infection 2018     Multiple gastric ulcers     Myocardial infarction (HCC) 2004    acute    Neutropenia (HCC)     Obese     Positive QuantiFERON-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 08/21/2017    Recurrent sinusitis     Thrombocytopenia (HCC)     Vertigo      Past Surgical History:   Procedure Laterality Date    ARTHROSCOPIC REPAIR ACL      lt knee    CARDIAC SURGERY      cardiac cath with stent    FOOT SURGERY      IR PORT CHECK  5/17/2019    KNEE ARTHROSCOPY      OTHER SURGICAL HISTORY      cardiac cath lesion 1, 1st adjunct treat device: stent    PORTACATH PLACEMENT      TN ESOPHAGOGASTRODUODENOSCOPY TRANSORAL DIAGNOSTIC N/A 10/5/2017    Procedure: ESOPHAGOGASTRODUODENOSCOPY (EGD) with bx;  Surgeon: Winifred Hansen DO;  Location: [facility];  Service: Gastroenterology    TN ESOPHAGOGASTRODUODENOSCOPY TRANSORAL DIAGNOSTIC N/A 3/8/2018    Procedure: ESOPHAGOGASTRODUODENOSCOPY (EGD) with biopsy;  Surgeon: Winifred Hansen DO;  Location: [facility];  Service: Gastroenterology    TN ESOPHAGOGASTRODUODENOSCOPY TRANSORAL DIAGNOSTIC N/A 6/20/2018    Procedure: ESOPHAGOGASTRODUODENOSCOPY (EGD) with Dilation;  Surgeon: Winifred Hansen DO;  Location: [facility];  Service: Gastroenterology    TN ESOPHAGOGASTRODUODENOSCOPY TRANSORAL DIAGNOSTIC N/A 10/18/2018    Procedure: ESOPHAGOGASTRODUODENOSCOPY (EGD) with dilation;  Surgeon: Edu Mejía MD;  Location: [facility];  Service: Gastroenterology    TN ESOPHAGOGASTRODUODENOSCOPY TRANSORAL DIAGNOSTIC N/A 6/7/2024    Procedure: ESOPHAGOGASTRODUODENOSCOPY (EGD);  Surgeon: Immanuel Gonsales MD;  Location: [facility];  Service: Thoracic    TN ESOPHAGOSCOPY FLEXIBLE TRANSORAL WITH BIOPSY N/A 9/2/2016    Procedure: ESOPHAGOGASTRODUODENOSCOPY (EGD); ESOPHAGEAL DILATION; ESOPHAGEAL BIOPSY;  Surgeon: Abdi Holcomb MD;  Location: [facility];  Service: Thoracic    TN LAPS RPR PARAESPHGL HRNA INCL FUNDPLSTY W/O MESH N/A 6/7/2024    Procedure: REPAIR HERNIA PARAESOPHAGEAL LAPAROSCOPIC W ROBOTICS TYPE 3. PARTIAL FUNDOPLICATION.  MESH.;  Surgeon: Immanuel Gonsales MD;  Location: [facility];  Service: Thoracic    TONSILLECTOMY      UPPER GASTROINTESTINAL ENDOSCOPY      x 6     Social History     Tobacco Use    Smoking status: Former     Current packs/day: 0.00     Average packs/day: 2.0 packs/day for 33.0 years (66.0 ttl pk-yrs)     Types: Cigarettes     Start date:      Quit date:      Years since quittin.3     Passive exposure: Past    Smokeless tobacco: Never   Vaping Use    Vaping status: Never Used   Substance and Sexual Activity    Alcohol use: Yes     Alcohol/week: 2.0 standard drinks of alcohol     Types: 2 Cans of beer per week     Comment: social use as per [employer]    Drug use: Never    Sexual activity: Not on file     E-Cigarette/Vaping    E-Cigarette Use Never User      E-Cigarette/Vaping Substances    Nicotine No     THC No     CBD No     Flavoring No     Other No     Unknown No      Family History   Problem Relation Age of Onset    Leukemia Mother         chronic    Colon polyps Mother         sidmoid    Parkinsonism Father      Tobacco History: as per HPI    Objective :  Temp:  [97.4 °F (36.3 °C)-100.3 °F (37.9 °C)] 98.2 °F (36.8 °C)  HR:  [82-94] 83  BP: (108-143)/(58-80) 138/75  Resp:  [18-22] 18  SpO2:  [91 %-98 %] 94 %  O2 Device: None (Room air)    Physical Exam:  General: No acute distress  HENT: Normocephalic, atraumatic.  PERRL, EOMI, Moist mucous membranes.  Neck: Supple  Lungs: diffuse rhonchi.  On room air  Heart: Regular rate and rhythm, S1-S2 present, no murmurs rubs or gallops  Extremities: Warm and well perfused, no cyanosis, clubbing, or edema  Skin: Warm, dry, no rashes or lesions  Neurologic: No focal neurologic deficits      Lab Results: I have reviewed the following results:  .     25  1756   WBC 8.85   HGB 13.9   HCT 42.8      SODIUM 140   K 3.8      CO2 27   BUN 16   CREATININE 1.02   GLUC 125   AST 26   ALT 28   ALB 4.4   TBILI 1.08*   ALKPHOS 42     ABG: No new results  in last 24 hours.    Imaging:   CT chest w/ co 4/23/2025  IMPRESSION:  1) Irregular lower lobe opacities in the central, partially groundglass opacity in the right upper lobe, increased from 4/9/2025. These findings likely represent worsening multifocal pneumonia. Other considerations include pneumonitis.     2) Some reticular opacities in the lower lobes and in the right upper lobe, as well as mild diffuse subpleural reticulation was also present on the 2022 CT, and may indicate a component of underlying interstitial lung disease. No honeycombing.      3) Bronchial wall thickening with some mucous plugging in the lower lobes     4) 7 mm left upper lobe groundglass nodule with no associated solid component, increased from 4 mm in 2022. This may represent an adenocarcinoma spectrum lesion. Follow-up noncontrast chest CT is recommended in 12 months.     5) Circumferential wall thickening of the distal esophagus, which may be related to esophagitis or gastroesophageal reflux.       CT chest w/o co 4/9/2025  IMPRESSION:  Multifocal pneumonia.   6 mm left upper lobe groundglass nodule. Based on current Fleischner Society 2017 Guidelines on incidental pulmonary nodule, follow-up noncontrast CT is recommended at 6-12 months from the initial examination to confirm persistence; if stable at that   time, additional follow-up CT is recommended for every 2 years until 5 years of stability is demonstrated.  Persistent distal esophageal thickening.    Other Study Results Review: Other studies reviewed include: TTE 4/11/2025  Left Ventricle Left ventricular cavity size, mild to moderate concentric left ventricular ectopy, low normal left ventricular systolic function.  Abnormal septal motion otherwise no regional wall motion abnormality noted.  Ejection fraction is estimated as around 52%.  Diastolic function could not be reliably assessed due to frequent ectopy.   Right Ventricle Borderline dilated right ventricle cavity, normal  right ventricular systolic function.   Left Atrium Top normal left atrial cavity size.   Right Atrium Normal right atrial cavity size.   Mitral Valve Normal mitral valve structure, mild mitral valve sclerosis and some focal calcification noted on the posterior mitral valve leaflet.  Adequate leaflet mobility. Trace mitral valve regurgitation.   Tricuspid Valve The tricuspid valve was not well visualized.  Trace tricuspid valve regurgitation.   Pulmonic Valve The pulmonic valve was not well visualized.  Trace pulmonic valve regurgitation.   Ascending Aorta Aortic root and proximal ascending aorta are normal in size on 2D imaging.   IVC/SVC The inferior vena cava is dilated. Respirophasic changes were normal.       PFT Results Reviewed:   04/13/2021- FEV1/FVC 73%, FEV1 3.28 L/89%, FVC 4.47 L/92%.  TLC 97%, %.  DLCO 76%.     Gerard Cabrera,   Pulmonary & Critical Care, PGY V

## 2025-04-24 NOTE — ASSESSMENT & PLAN NOTE
"CT chest demonstrating \"Circumferential wall thickening of the distal esophagus, which may be related to esophagitis or gastroesophageal reflux.\"  Continue PPI  "

## 2025-04-24 NOTE — PROGRESS NOTES
Cayetano Lucero is a 69 y.o. male who is currently ordered Vancomycin IV with management by the Pharmacy Consult service.  Relevant clinical data and objective / subjective history reviewed.  Vancomycin Assessment:  Indication and Goal AUC/Trough: Pneumonia (goal -600, trough >10)  Clinical Status: New consult  Micro: No blood cultures ordered as of now  Renal Function:  SCr: 1.02 mg/dL  CrCl: 76.2 mL/min  Renal replacement: Not on dialysis  Days of Therapy: 1  Current Dose: 2000 mg IV Load  Vancomycin Plan:  New Dosin mg IV Q12H  Estimated AUC: 413 mcg*hr/mL  Estimated Trough: 12.7 mcg/mL  Next Level: Random level  with AM labs  Renal Function Monitoring: Daily BMP and UOP  Pharmacy will continue to follow closely for s/sx of nephrotoxicity, infusion reactions and appropriateness of therapy.  BMP and CBC will be ordered per protocol. We will continue to follow the patient’s culture results and clinical progress daily.    Jb Sandy, Pharmacist

## 2025-04-24 NOTE — ASSESSMENT & PLAN NOTE
Known to Dr. Mejia, patient on ibrutinib and prednisone daily  Also receiving IVIG and Gazyva every 4 weeks  Continue outpatient follow-up with hem/onc, last seen 4/16

## 2025-04-25 ENCOUNTER — ANESTHESIA (INPATIENT)
Dept: GASTROENTEROLOGY | Facility: HOSPITAL | Age: 70
DRG: 178 | End: 2025-04-25
Payer: MEDICARE

## 2025-04-25 ENCOUNTER — ANESTHESIA EVENT (INPATIENT)
Dept: GASTROENTEROLOGY | Facility: HOSPITAL | Age: 70
DRG: 178 | End: 2025-04-25
Payer: MEDICARE

## 2025-04-25 ENCOUNTER — APPOINTMENT (INPATIENT)
Dept: GASTROENTEROLOGY | Facility: HOSPITAL | Age: 70
DRG: 178 | End: 2025-04-25
Attending: INTERNAL MEDICINE
Payer: MEDICARE

## 2025-04-25 ENCOUNTER — TELEPHONE (OUTPATIENT)
Age: 70
End: 2025-04-25

## 2025-04-25 LAB
ANION GAP SERPL CALCULATED.3IONS-SCNC: 8 MMOL/L (ref 4–13)
BUN SERPL-MCNC: 8 MG/DL (ref 5–25)
CALCIUM SERPL-MCNC: 8.7 MG/DL (ref 8.4–10.2)
CHLORIDE SERPL-SCNC: 108 MMOL/L (ref 96–108)
CO2 SERPL-SCNC: 22 MMOL/L (ref 21–32)
CREAT SERPL-MCNC: 0.81 MG/DL (ref 0.6–1.3)
ERYTHROCYTE [DISTWIDTH] IN BLOOD BY AUTOMATED COUNT: 13.6 % (ref 11.6–15.1)
GFR SERPL CREATININE-BSD FRML MDRD: 90 ML/MIN/1.73SQ M
GLUCOSE SERPL-MCNC: 103 MG/DL (ref 65–140)
GLUCOSE SERPL-MCNC: 104 MG/DL (ref 65–140)
GLUCOSE SERPL-MCNC: 108 MG/DL (ref 65–140)
GLUCOSE SERPL-MCNC: 116 MG/DL (ref 65–140)
GLUCOSE SERPL-MCNC: 134 MG/DL (ref 65–140)
GLUCOSE SERPL-MCNC: 72 MG/DL (ref 65–140)
GLUCOSE SERPL-MCNC: 75 MG/DL (ref 65–140)
GLUCOSE SERPL-MCNC: 91 MG/DL (ref 65–140)
HCT VFR BLD AUTO: 37.5 % (ref 36.5–49.3)
HGB BLD-MCNC: 12.4 G/DL (ref 12–17)
MCH RBC QN AUTO: 30 PG (ref 26.8–34.3)
MCHC RBC AUTO-ENTMCNC: 33.1 G/DL (ref 31.4–37.4)
MCV RBC AUTO: 91 FL (ref 82–98)
MRSA NOSE QL CULT: NORMAL
PLATELET # BLD AUTO: 144 THOUSANDS/UL (ref 149–390)
PMV BLD AUTO: 11.4 FL (ref 8.9–12.7)
POTASSIUM SERPL-SCNC: 3.6 MMOL/L (ref 3.5–5.3)
PROCALCITONIN SERPL-MCNC: 0.09 NG/ML
RBC # BLD AUTO: 4.13 MILLION/UL (ref 3.88–5.62)
SODIUM SERPL-SCNC: 138 MMOL/L (ref 135–147)
VANCOMYCIN SERPL-MCNC: 11 UG/ML (ref 10–20)
WBC # BLD AUTO: 4.81 THOUSAND/UL (ref 4.31–10.16)

## 2025-04-25 PROCEDURE — 87070 CULTURE OTHR SPECIMN AEROBIC: CPT | Performed by: STUDENT IN AN ORGANIZED HEALTH CARE EDUCATION/TRAINING PROGRAM

## 2025-04-25 PROCEDURE — 82948 REAGENT STRIP/BLOOD GLUCOSE: CPT

## 2025-04-25 PROCEDURE — 0B9M8ZX DRAINAGE OF BILATERAL LUNGS, VIA NATURAL OR ARTIFICIAL OPENING ENDOSCOPIC, DIAGNOSTIC: ICD-10-PCS | Performed by: INTERNAL MEDICINE

## 2025-04-25 PROCEDURE — 88112 CYTOPATH CELL ENHANCE TECH: CPT | Performed by: PATHOLOGY

## 2025-04-25 PROCEDURE — 80048 BASIC METABOLIC PNL TOTAL CA: CPT | Performed by: INTERNAL MEDICINE

## 2025-04-25 PROCEDURE — 85027 COMPLETE CBC AUTOMATED: CPT | Performed by: INTERNAL MEDICINE

## 2025-04-25 PROCEDURE — 87102 FUNGUS ISOLATION CULTURE: CPT | Performed by: STUDENT IN AN ORGANIZED HEALTH CARE EDUCATION/TRAINING PROGRAM

## 2025-04-25 PROCEDURE — 87116 MYCOBACTERIA CULTURE: CPT | Performed by: STUDENT IN AN ORGANIZED HEALTH CARE EDUCATION/TRAINING PROGRAM

## 2025-04-25 PROCEDURE — 84145 PROCALCITONIN (PCT): CPT

## 2025-04-25 PROCEDURE — 99232 SBSQ HOSP IP/OBS MODERATE 35: CPT | Performed by: INTERNAL MEDICINE

## 2025-04-25 PROCEDURE — 87205 SMEAR GRAM STAIN: CPT | Performed by: STUDENT IN AN ORGANIZED HEALTH CARE EDUCATION/TRAINING PROGRAM

## 2025-04-25 PROCEDURE — 88305 TISSUE EXAM BY PATHOLOGIST: CPT | Performed by: PATHOLOGY

## 2025-04-25 PROCEDURE — 31624 DX BRONCHOSCOPE/LAVAGE: CPT | Performed by: INTERNAL MEDICINE

## 2025-04-25 PROCEDURE — 87798 DETECT AGENT NOS DNA AMP: CPT | Performed by: STUDENT IN AN ORGANIZED HEALTH CARE EDUCATION/TRAINING PROGRAM

## 2025-04-25 PROCEDURE — 87106 FUNGI IDENTIFICATION YEAST: CPT | Performed by: STUDENT IN AN ORGANIZED HEALTH CARE EDUCATION/TRAINING PROGRAM

## 2025-04-25 PROCEDURE — 80202 ASSAY OF VANCOMYCIN: CPT

## 2025-04-25 PROCEDURE — 87206 SMEAR FLUORESCENT/ACID STAI: CPT | Performed by: STUDENT IN AN ORGANIZED HEALTH CARE EDUCATION/TRAINING PROGRAM

## 2025-04-25 RX ORDER — ONDANSETRON 2 MG/ML
INJECTION INTRAMUSCULAR; INTRAVENOUS AS NEEDED
Status: DISCONTINUED | OUTPATIENT
Start: 2025-04-25 | End: 2025-04-25

## 2025-04-25 RX ORDER — SODIUM CHLORIDE, SODIUM LACTATE, POTASSIUM CHLORIDE, CALCIUM CHLORIDE 600; 310; 30; 20 MG/100ML; MG/100ML; MG/100ML; MG/100ML
20 INJECTION, SOLUTION INTRAVENOUS CONTINUOUS
Status: CANCELLED | OUTPATIENT
Start: 2025-04-25

## 2025-04-25 RX ORDER — PROPOFOL 10 MG/ML
INJECTION, EMULSION INTRAVENOUS AS NEEDED
Status: DISCONTINUED | OUTPATIENT
Start: 2025-04-25 | End: 2025-04-25

## 2025-04-25 RX ORDER — SODIUM CHLORIDE 9 MG/ML
INJECTION, SOLUTION INTRAVENOUS CONTINUOUS PRN
Status: DISCONTINUED | OUTPATIENT
Start: 2025-04-25 | End: 2025-04-25

## 2025-04-25 RX ORDER — LIDOCAINE HYDROCHLORIDE 10 MG/ML
INJECTION, SOLUTION EPIDURAL; INFILTRATION; INTRACAUDAL; PERINEURAL
Status: DISCONTINUED
Start: 2025-04-25 | End: 2025-04-27 | Stop reason: HOSPADM

## 2025-04-25 RX ORDER — VANCOMYCIN HYDROCHLORIDE 750 MG/150ML
750 INJECTION, SOLUTION INTRAVENOUS EVERY 8 HOURS
Status: DISCONTINUED | OUTPATIENT
Start: 2025-04-25 | End: 2025-04-26 | Stop reason: SDUPTHER

## 2025-04-25 RX ORDER — LIDOCAINE HYDROCHLORIDE 20 MG/ML
INJECTION, SOLUTION EPIDURAL; INFILTRATION; INTRACAUDAL; PERINEURAL AS NEEDED
Status: DISCONTINUED | OUTPATIENT
Start: 2025-04-25 | End: 2025-04-25

## 2025-04-25 RX ORDER — SODIUM CHLORIDE, SODIUM LACTATE, POTASSIUM CHLORIDE, CALCIUM CHLORIDE 600; 310; 30; 20 MG/100ML; MG/100ML; MG/100ML; MG/100ML
125 INJECTION, SOLUTION INTRAVENOUS CONTINUOUS
Status: CANCELLED | OUTPATIENT
Start: 2025-04-25

## 2025-04-25 RX ADMIN — AMLODIPINE BESYLATE 10 MG: 10 TABLET ORAL at 10:17

## 2025-04-25 RX ADMIN — VANCOMYCIN HYDROCHLORIDE 750 MG: 750 INJECTION, SOLUTION INTRAVENOUS at 08:00

## 2025-04-25 RX ADMIN — GUAIFENESIN 600 MG: 600 TABLET ORAL at 17:05

## 2025-04-25 RX ADMIN — CEFEPIME 2000 MG: 2 INJECTION, POWDER, FOR SOLUTION INTRAVENOUS at 10:10

## 2025-04-25 RX ADMIN — ACYCLOVIR 400 MG: 800 TABLET ORAL at 17:05

## 2025-04-25 RX ADMIN — LOSARTAN POTASSIUM 100 MG: 50 TABLET, FILM COATED ORAL at 10:17

## 2025-04-25 RX ADMIN — GABAPENTIN 600 MG: 300 CAPSULE ORAL at 10:17

## 2025-04-25 RX ADMIN — PROPOFOL 150 MG: 10 INJECTION, EMULSION INTRAVENOUS at 15:06

## 2025-04-25 RX ADMIN — PANTOPRAZOLE SODIUM 40 MG: 40 TABLET, DELAYED RELEASE ORAL at 05:40

## 2025-04-25 RX ADMIN — FOLIC ACID TAB 400 MCG 800 MCG: 400 TAB at 10:17

## 2025-04-25 RX ADMIN — ONDANSETRON 4 MG: 2 INJECTION INTRAMUSCULAR; INTRAVENOUS at 15:14

## 2025-04-25 RX ADMIN — SODIUM CHLORIDE: 0.9 INJECTION, SOLUTION INTRAVENOUS at 15:00

## 2025-04-25 RX ADMIN — INSULIN LISPRO 12 UNITS: 100 INJECTION, SOLUTION INTRAVENOUS; SUBCUTANEOUS at 18:24

## 2025-04-25 RX ADMIN — LIDOCAINE HYDROCHLORIDE 100 MG: 20 INJECTION, SOLUTION EPIDURAL; INFILTRATION; INTRACAUDAL at 15:06

## 2025-04-25 RX ADMIN — PROPOFOL 50 MG: 10 INJECTION, EMULSION INTRAVENOUS at 15:11

## 2025-04-25 RX ADMIN — VANCOMYCIN HYDROCHLORIDE 750 MG: 750 INJECTION, SOLUTION INTRAVENOUS at 23:48

## 2025-04-25 RX ADMIN — PROPOFOL 50 MG: 10 INJECTION, EMULSION INTRAVENOUS at 15:14

## 2025-04-25 RX ADMIN — EZETIMIBE 10 MG: 10 TABLET ORAL at 21:47

## 2025-04-25 RX ADMIN — ACYCLOVIR 400 MG: 800 TABLET ORAL at 10:18

## 2025-04-25 RX ADMIN — GUAIFENESIN AND DEXTROMETHORPHAN 10 ML: 100; 10 SYRUP ORAL at 21:47

## 2025-04-25 RX ADMIN — VANCOMYCIN HYDROCHLORIDE 750 MG: 750 INJECTION, SOLUTION INTRAVENOUS at 17:05

## 2025-04-25 RX ADMIN — CITALOPRAM HYDROBROMIDE 40 MG: 20 TABLET ORAL at 10:17

## 2025-04-25 RX ADMIN — FLUTICASONE FUROATE AND VILANTEROL TRIFENATATE 1 PUFF: 200; 25 POWDER RESPIRATORY (INHALATION) at 08:00

## 2025-04-25 RX ADMIN — CEFEPIME 2000 MG: 2 INJECTION, POWDER, FOR SOLUTION INTRAVENOUS at 22:35

## 2025-04-25 RX ADMIN — HYDROCHLOROTHIAZIDE 12.5 MG: 12.5 TABLET ORAL at 10:17

## 2025-04-25 RX ADMIN — ASPIRIN 81 MG: 81 TABLET, CHEWABLE ORAL at 10:17

## 2025-04-25 RX ADMIN — PREDNISONE 5 MG: 5 TABLET ORAL at 10:17

## 2025-04-25 RX ADMIN — GUAIFENESIN 600 MG: 600 TABLET ORAL at 10:17

## 2025-04-25 NOTE — ANESTHESIA PREPROCEDURE EVALUATION
Procedure:  BRONCHOSCOPY    Relevant Problems   ANESTHESIA (within normal limits)      CARDIO   (+) CAD (coronary artery disease)   (+) Dyspnea on exertion   (+) Hyperlipidemia   (+) Hypertension      ENDO   (+) Type 2 diabetes mellitus with diabetic polyneuropathy, with long-term current use of insulin (HCC)   (+) Type 2 diabetes mellitus, with long-term current use of insulin (HCC)      GI/HEPATIC   (+) Dysphagia   (+) Esophageal dysphagia   (+) Esophageal reflux   (+) Ulcer of esophagus without bleeding      /RENAL   (+) Chronic kidney disease, stage II (mild)      HEMATOLOGY   (+) Anemia, chronic disease   (+) Autoimmune hemolytic anemia (HCC)   (+) 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)   (+) Hemolytic anemia (HCC)   (+) Pancytopenia (HCC)   (+) Thrombocytopenia (HCC)      NEURO/PSYCH   (+) Chronic right shoulder pain   (+) Depression, recurrent (HCC)   (+) Headache around the eyes   (+) Type 2 diabetes mellitus with diabetic polyneuropathy, with long-term current use of insulin (HCC)      PULMONARY   (+) Chronic obstructive pulmonary disease (HCC)   (+) Dyspnea on exertion   (+) Multifocal pneumonia      Other   (+) CLL (chronic lymphocytic leukemia) (HCC)   (+) Chronic lymphocytic leukemia (HCC)        Physical Exam    Airway    Mallampati score: II  TM Distance: >3 FB  Neck ROM: full     Dental       Cardiovascular  Cardiovascular exam normal    Pulmonary   Decreased breath sounds    Other Findings        Anesthesia Plan  ASA Score- 3     Anesthesia Type- general with ASA Monitors.         Additional Monitors:     Airway Plan: LMA.           Plan Factors-    Chart reviewed.    Patient summary reviewed.                  Induction- intravenous.    Postoperative Plan-     Perioperative Resuscitation Plan - Level 1 - Full Code.       Informed Consent- Anesthetic plan and risks discussed with patient.        NPO Status:  No vitals data found for the desired time range.

## 2025-04-25 NOTE — PROGRESS NOTES
Progress Note - Hospitalist   Name: Cayetano Lucero 69 y.o. male I MRN: [id number]  Unit/Bed#: E5 -01 I Date of Admission: 4/23/2025   Date of Service: 4/25/2025 I Hospital Day: 2    Assessment & Plan  Multifocal pneumonia  Pt presented to the ED secondary to episode of chills with worsening shortness of breath and productive cough. Pt currently on RA.  Of note, pt recently hospitalized for this issue 4/9-4/13. Treated with IV ceftriaxone x 4 days and transitioned to oral cefuroxime to complete 7 day course. Also treated with doxy x 5 days.   CT chest today demonstrating:  Irregular lower lobe opacities in the central, partially groundglass opacity in the right upper lobe, increased from 4/9/2025. These findings likely represent worsening multifocal pneumonia. Other considerations include pneumonitis.   Some reticular opacities in the lower lobes and in the right upper lobe... may indicate a component of underlying interstitial lung disease. No honeycombing.   Bronchial wall thickening with some mucous plugging in the lower lobes, likely also related to the aforementioned process.   WBC 8.85, not fulfilling SIRS criteria  Will treat with broad ABX for now given worsening and immunocompromised state, IV cefepime + vancomycin  Appreciate pulmonary recommendations  S/p bronchoscopy- await cultures     CLL (chronic lymphocytic leukemia) (HCC)  Known to Dr. Mejia, patient on ibrutinib and prednisone daily  Also receiving IVIG and Gazyva every 4 weeks  Continue outpatient follow-up with hem/onc, last seen 4/16  Chronic obstructive pulmonary disease (HCC)  No acute exacerbation  Continue home inhaler regimen  Hyperlipidemia  Continue zetia  Hypertension  Continue home regimen of amlodipine, HCTZ, and losartan  Type 2 diabetes mellitus, with long-term current use of insulin ([facility])  Lab Results   Component Value Date    HGBA1C 6.6 (A) 01/29/2025       Recent Labs     04/25/25  0606 04/25/25  0729 04/25/25  1131  "04/25/25  1703   POCGLU 103 108 134 116       Blood Sugar Average: Last 72 hrs:  (P) 110Continue 18 units of Lantus hs 12 units lispro tid with meals  Lantus held while he was npo  Will continue to monitor and make adjustments as needed   SSI with accucheks    Pulmonary nodule  CT chest demonstrating \"7 mm left upper lobe groundglass nodule with no associated solid component, increased from 4 mm in 2022. This may represent an adenocarcinoma spectrum lesion. Follow-up noncontrast chest CT is recommended in 12 months.\"  Outpatient follow-up  Esophageal reflux  CT chest demonstrating \"Circumferential wall thickening of the distal esophagus, which may be related to esophagitis or gastroesophageal reflux.\"  Continue PPI    VTE Pharmacologic Prophylaxis: VTE Score: 4 scd    Mobility:   Basic Mobility Inpatient Raw Score: 23  JH-HLM Goal: 7: Walk 25 feet or more  JH-HLM Achieved: 1: Laying in bed (at GI lab)      Patient Centered Rounds:  discussed with his nurse       Education and Discussions with Family / Patient: Patient declined call to .     Current Length of Stay: 2 day(s)  Current Patient Status: Inpatient     Discharge Plan: Anticipate discharge in 24-48 hrs to home.    Code Status: Level 1 - Full Code    Subjective   Pt seen and examined. Pt is hungry after being npo for bronchoscopy. He is coughing less but still having a nonproductive cough. No other problems were noted     Objective :  Temp:  [98.3 °F (36.8 °C)-98.7 °F (37.1 °C)] 98.3 °F (36.8 °C)  HR:  [78-85] 79  BP: (107-149)/(52-86) 107/58  Resp:  [17-24] 22  SpO2:  [93 %-97 %] 94 %  O2 Device: None (Room air)    Body mass index is 27.28 kg/m².     Input and Output Summary (last 24 hours):     Intake/Output Summary (Last 24 hours) at 4/25/2025 1710  Last data filed at 4/25/2025 1539  Gross per 24 hour   Intake 500 ml   Output --   Net 500 ml       Physical Exam  Constitutional:       Appearance: Normal appearance.   HENT:      Head: " Normocephalic and atraumatic.   Eyes:      Extraocular Movements: Extraocular movements intact.      Pupils: Pupils are equal, round, and reactive to light.   Cardiovascular:      Rate and Rhythm: Normal rate and regular rhythm.      Heart sounds: No murmur heard.     No friction rub. No gallop.   Pulmonary:      Effort: Pulmonary effort is normal. No respiratory distress.      Breath sounds: Normal breath sounds. No wheezing, rhonchi or rales.   Abdominal:      General: Bowel sounds are normal. There is no distension.      Palpations: Abdomen is soft.      Tenderness: There is no abdominal tenderness. There is no guarding or rebound.   Neurological:      Mental Status: He is alert and oriented to person, place, and time.       Lines/Drains:      Lab Results: I have reviewed the following results:   Results from last 7 days   Lab Units 04/25/25  0526 04/23/25  1756   WBC Thousand/uL 4.81 8.85   HEMOGLOBIN g/dL 12.4 13.9   HEMATOCRIT % 37.5 42.8   PLATELETS Thousands/uL 144* 184   LYMPHO PCT %  --  2*   MONO PCT %  --  12   EOS PCT %  --  0     Results from last 7 days   Lab Units 04/25/25  0526 04/23/25  1756   SODIUM mmol/L 138 140   POTASSIUM mmol/L 3.6 3.8   CHLORIDE mmol/L 108 104   CO2 mmol/L 22 27   BUN mg/dL 8 16   CREATININE mg/dL 0.81 1.02   ANION GAP mmol/L 8 9   CALCIUM mg/dL 8.7 9.3   ALBUMIN g/dL  --  4.4   TOTAL BILIRUBIN mg/dL  --  1.08*   ALK PHOS U/L  --  42   ALT U/L  --  28   AST U/L  --  26   GLUCOSE RANDOM mg/dL 91 125         Results from last 7 days   Lab Units 04/25/25  1703 04/25/25  1131 04/25/25  0729 04/25/25  0606 04/24/25  2155 04/24/25  1609 04/24/25  1149 04/24/25  0713 04/24/25  0049   POC GLUCOSE mg/dl 116 134 108 103 109 123 87 112 98         Results from last 7 days   Lab Units 04/25/25  0526 04/23/25  1756   PROCALCITONIN ng/ml 0.09 0.12       Recent Cultures (last 7 days):   Results from last 7 days   Lab Units 04/24/25  0551 04/24/25  0547   SPUTUM CULTURE   --  Culture too  young- will reincubate   GRAM STAIN RESULT   --  3+ Polys*  3+ Gram negative rods*  1+ Gram positive cocci in pairs*  1+ Epithelial Cells*   LEGIONELLA URINARY ANTIGEN  Negative  --        Imaging Results Review: No pertinent imaging studies reviewed.  Other Study Results Review: No additional pertinent studies reviewed.    Last 24 Hours Medication List:     Current Facility-Administered Medications:     acetaminophen (TYLENOL) tablet 650 mg, Q6H PRN    acyclovir (ZOVIRAX) tablet 400 mg, BID    albuterol (PROVENTIL HFA,VENTOLIN HFA) inhaler 2 puff, Q6H PRN    albuterol inhalation solution 2.5 mg, Q6H PRN    amLODIPine (NORVASC) tablet 10 mg, Daily    aspirin chewable tablet 81 mg, Daily    benzonatate (TESSALON PERLES) capsule 200 mg, TID PRN    ceFEPime (MAXIPIME) 2,000 mg in dextrose 5 % 50 mL IVPB, Q12H, Last Rate: 2,000 mg (04/25/25 1010)    citalopram (CeleXA) tablet 40 mg, Daily    dextromethorphan-guaiFENesin (ROBITUSSIN DM) oral syrup 10 mL, Q4H PRN    ezetimibe (ZETIA) tablet 10 mg, HS    fluticasone-vilanterol 200-25 mcg/actuation 1 puff, Daily    folic acid (FOLVITE) tablet 800 mcg, Daily    gabapentin (NEURONTIN) capsule 600 mg, Daily    guaiFENesin (MUCINEX) 12 hr tablet 600 mg, BID    hydroCHLOROthiazide tablet 12.5 mg, Daily    Ibrutinib CAPS 140 mg, Daily    insulin glargine (LANTUS) subcutaneous injection 18 Units 0.18 mL, HS    insulin lispro (HumALOG/ADMELOG) 100 units/mL subcutaneous injection 1-6 Units, TID AC **AND** Fingerstick Glucose (POCT), TID AC    insulin lispro (HumALOG/ADMELOG) 100 units/mL subcutaneous injection 1-6 Units, HS    insulin lispro (HumALOG/ADMELOG) 100 units/mL subcutaneous injection 12 Units, TID With Meals    lidocaine (PF) (XYLOCAINE-MPF) 1 % injection **ADS Override Pull**,     LORazepam (ATIVAN) tablet 0.5 mg, Daily PRN    losartan (COZAAR) tablet 100 mg, Daily    oxyCODONE (ROXICODONE) immediate release tablet 10 mg, Q6H PRN    pantoprazole (PROTONIX) EC tablet  40 mg, Early Morning    predniSONE tablet 5 mg, Daily    vancomycin (VANCOCIN) IVPB (premix in dextrose) 750 mg 150 mL, Q8H, Last Rate: 750 mg (04/25/25 1705)    Administrative Statements   Today, Patient Was Seen By: Mayelin Reese DO

## 2025-04-25 NOTE — ASSESSMENT & PLAN NOTE
Multiple hospitalizations and antibiotic therapy for treatment of multifocal pneumonia in an immunocompromised patient.  Prior hospitalization sputum culture positive for H. influenzae.  Completed 7 days of cefuroxime and 5 days of doxycycline.  Patient discharged and returned with fever, worsening of his shortness of breath, and productive cough  CT chest w co showing irregular lower lobe opacities, bronchial wall thickening and mucous plugging in the lower lobes  Currently on cefepime and vancomycin   Repeat sputum culture pending  MRSA swab ordered  Bronchoscopy today with BAL

## 2025-04-25 NOTE — PROGRESS NOTES
Cayetano Lucero is a 69 y.o. male who is currently ordered Vancomycin IV with management by the Pharmacy Consult service.  Relevant clinical data and objective / subjective history reviewed.  Vancomycin Assessment:  Indication and Goal AUC/Trough: Pneumonia (goal -600, trough >10)  Clinical Status: stable  Micro:     Renal Function:  SCr: 0.81 mg/dL  CrCl: 95.9 mL/min  Renal replacement: Not on dialysis  Days of Therapy: 2  Current Dose: 2000 mg IV loading dose  Vancomycin Plan:  New Dosin mg IV q 8 hrs  Estimated AUC: 431 mcg*hr/mL  Estimated Trough: 13.5 mcg/mL  Next Level: 25 with am labs  Renal Function Monitoring: Daily BMP and UOP  Pharmacy will continue to follow closely for s/sx of nephrotoxicity, infusion reactions and appropriateness of therapy.  BMP and CBC will be ordered per protocol. We will continue to follow the patient’s culture results and clinical progress daily.    Vivien Blanchard, Pharmacist

## 2025-04-25 NOTE — ASSESSMENT & PLAN NOTE
Pt presented to the ED secondary to episode of chills with worsening shortness of breath and productive cough. Pt currently on RA.  Of note, pt recently hospitalized for this issue 4/9-4/13. Treated with IV ceftriaxone x 4 days and transitioned to oral cefuroxime to complete 7 day course. Also treated with doxy x 5 days.   CT chest today demonstrating:  Irregular lower lobe opacities in the central, partially groundglass opacity in the right upper lobe, increased from 4/9/2025. These findings likely represent worsening multifocal pneumonia. Other considerations include pneumonitis.   Some reticular opacities in the lower lobes and in the right upper lobe... may indicate a component of underlying interstitial lung disease. No honeycombing.   Bronchial wall thickening with some mucous plugging in the lower lobes, likely also related to the aforementioned process.   WBC 8.85, not fulfilling SIRS criteria  Will treat with broad ABX for now given worsening and immunocompromised state, IV cefepime + vancomycin  Appreciate pulmonary recommendations  S/p bronchoscopy- await cultures

## 2025-04-25 NOTE — ANESTHESIA POSTPROCEDURE EVALUATION
Post-Op Assessment Note    CV Status:  Stable    Pain management: adequate       Mental Status:  Alert and awake   Hydration Status:  Euvolemic   PONV Controlled:  Controlled   Airway Patency:  Patent     Post Op Vitals Reviewed: Yes    No anethesia notable event occurred.    Staff: Anesthesiologist           Last Filed PACU Vitals:  Vitals Value Taken Time   Temp     Pulse 79 04/25/25 1605   /58 04/25/25 1605   Resp 22 04/25/25 1605   SpO2 94 % 04/25/25 1605       Modified Ivelisse:     Vitals Value Taken Time   Activity 2 04/25/25 1605   Respiration 2 04/25/25 1605   Circulation 2 04/25/25 1605   Consciousness 2 04/25/25 1605   Oxygen Saturation 2 04/25/25 1605     Modified Ivelisse Score: 10

## 2025-04-25 NOTE — TELEPHONE ENCOUNTER
FYI- Patient states he is currently admitted to [facility] for pneumonia. States he has been NPO since 5 pm yesterday. States he asked the nurse for glucose tabs this morning since BS was 80 but they said no. States BS is now 130. Asking how BS can be 130 if he has not eaten. Asked patient if he has an IV running and he stated yes. Instructed to ask medical staff if he is receiving glucose via IV or anything that may be raising his BS. Patient verbalized understanding.

## 2025-04-25 NOTE — PLAN OF CARE
Problem: PAIN - ADULT  Goal: Verbalizes/displays adequate comfort level or baseline comfort level  Description: Interventions:- Encourage patient to monitor pain and request assistance- Assess pain using appropriate pain scale- Administer analgesics based on type and severity of pain and evaluate response- Implement non-pharmacological measures as appropriate and evaluate response- Consider cultural and social influences on pain and pain management- Notify physician/advanced practitioner if interventions unsuccessful or patient reports new pain  Outcome: Progressing     Problem: INFECTION - ADULT  Goal: Absence or prevention of progression during hospitalization  Description: INTERVENTIONS:- Assess and monitor for signs and symptoms of infection- Monitor lab/diagnostic results- Monitor all insertion sites, i.e. indwelling lines, tubes, and drains- Monitor endotracheal if appropriate and nasal secretions for changes in amount and color- [place] appropriate cooling/warming therapies per order- Administer medications as ordered- Instruct and encourage patient and family to use good hand hygiene technique- Identify and instruct in appropriate isolation precautions for identified infection/condition  Outcome: Progressing  Goal: Absence of fever/infection during neutropenic period  Description: INTERVENTIONS:- Monitor WBC  Outcome: Progressing     Problem: SAFETY ADULT  Goal: Patient will remain free of falls  Description: INTERVENTIONS:- Educate patient/family on patient safety including physical limitations- Instruct patient to call for assistance with activity - Consult OT/PT to assist with strengthening/mobility - Keep Call bell within reach- Keep bed low and locked with side rails adjusted as appropriate- Keep care items and personal belongings within reach- Initiate and maintain comfort rounds- Make Fall Risk Sign visible to staff- Offer Toileting every 2 Hours, in advance of need- Initiate/Maintain bed alarm-  Obtain necessary fall risk management equipment - Apply yellow socks and bracelet for high fall risk patients- Consider moving patient to room near nurses station  Outcome: Progressing     Problem: DISCHARGE PLANNING  Goal: Discharge to home or other facility with appropriate resources  Description: INTERVENTIONS:- Identify barriers to discharge w/patient and caregiver- Arrange for needed discharge resources and transportation as appropriate- Identify discharge learning needs (meds, wound care, etc.)- Arrange for interpretive services to assist at discharge as needed- Refer to Case Management Department for coordinating discharge planning if the patient needs post-hospital services based on physician/advanced practitioner order or complex needs related to functional status, cognitive ability, or social support system  Outcome: Progressing     Problem: Knowledge Deficit  Goal: Patient/family/caregiver demonstrates understanding of disease process, treatment plan, medications, and discharge instructions  Description: Complete learning assessment and assess knowledge base.Interventions:- Provide teaching at level of understanding- Provide teaching via preferred learning methods  Outcome: Progressing     Problem: RESPIRATORY - ADULT  Goal: Achieves optimal ventilation and oxygenation  Description: INTERVENTIONS:- Assess for changes in respiratory status- Assess for changes in mentation and behavior- Position to facilitate oxygenation and minimize respiratory effort- Oxygen administered by appropriate delivery if ordered- Initiate smoking cessation education as indicated- Encourage broncho-pulmonary hygiene including cough, deep breathe, Incentive Spirometry- Assess the need for suctioning and aspirate as needed- Assess and instruct to report SOB or any respiratory difficulty- Respiratory Therapy support as indicated  Outcome: Progressing     Problem: METABOLIC, FLUID AND ELECTROLYTES - ADULT  Goal: Electrolytes  maintained within normal limits  Description: INTERVENTIONS:- Monitor labs and assess patient for signs and symptoms of electrolyte imbalances- Administer electrolyte replacement as ordered- Monitor response to electrolyte replacements, including repeat lab results as appropriate- Instruct patient on fluid and nutrition as appropriate  Outcome: Progressing  Goal: Glucose maintained within target range  Description: INTERVENTIONS:- Monitor Blood Glucose as ordered- Assess for signs and symptoms of hyperglycemia and hypoglycemia- Administer ordered medications to maintain glucose within target range- Assess nutritional intake and initiate nutrition service referral as needed  Outcome: Progressing     Problem: Nutrition/Hydration-ADULT  Goal: Nutrient/Hydration intake appropriate for improving, restoring or maintaining nutritional needs  Description: Monitor and assess patient's nutrition/hydration status for malnutrition. Collaborate with interdisciplinary team and initiate plan and interventions as ordered.  Monitor patient's weight and dietary intake as ordered or per policy. Utilize nutrition screening tool and intervene as necessary. Determine patient's food preferences and provide high-protein, high-caloric foods as appropriate. INTERVENTIONS:- Monitor oral intake, urinary output, labs, and treatment plans- Assess nutrition and hydration status and recommend course of action- Evaluate amount of meals eaten- Assist patient with eating if necessary - Allow adequate time for meals- Recommend/ encourage appropriate diets, oral nutritional supplements, and vitamin/mineral supplements- Order, calculate, and assess calorie counts as needed- Recommend, monitor, and adjust tube feedings and TPN/PPN based on assessed needs- Assess need for intravenous fluids- Provide specific nutrition/hydration education as appropriate- Include patient/family/caregiver in decisions related to nutrition  Outcome: Progressing     Problem:  Prexisting or High Potential for Compromised Skin Integrity  Goal: Skin integrity is maintained or improved  Description: INTERVENTIONS:- Identify patients at risk for skin breakdown- Assess and monitor skin integrity- Assess and monitor nutrition and hydration status- Monitor labs - Assess for incontinence - Turn and reposition patient- Assist with mobility/ambulation- Relieve pressure over bony prominences- Avoid friction and shearing- Provide appropriate hygiene as needed including keeping skin clean and dry- Evaluate need for skin moisturizer/barrier cream- Collaborate with interdisciplinary team - Patient/family teaching- Consider wound care consult   Outcome: Progressing     Problem: Prexisting or High Potential for Compromised Skin Integrity  Goal: Skin integrity is maintained or improved  Description: INTERVENTIONS:- Identify patients at risk for skin breakdown- Assess and monitor skin integrity- Assess and monitor nutrition and hydration status- Monitor labs - Assess for incontinence - Turn and reposition patient- Assist with mobility/ambulation- Relieve pressure over bony prominences- Avoid friction and shearing- Provide appropriate hygiene as needed including keeping skin clean and dry- Evaluate need for skin moisturizer/barrier cream- Collaborate with interdisciplinary team - Patient/family teaching- Consider wound care consult   Outcome: Progressing

## 2025-04-25 NOTE — ASSESSMENT & PLAN NOTE
Lab Results   Component Value Date    HGBA1C 6.6 (A) 01/29/2025       Recent Labs     04/25/25  0606 04/25/25  0729 04/25/25  1131 04/25/25  1703   POCGLU 103 108 134 116       Blood Sugar Average: Last 72 hrs:  (P) 110Continue 18 units of Lantus hs 12 units lispro tid with meals  Lantus held while he was npo  Will continue to monitor and make adjustments as needed   SSI with accucheks

## 2025-04-25 NOTE — PROGRESS NOTES
Progress Note - Pulmonology   Name: Cayetano Lucero 69 y.o. male I MRN: [id number]  Unit/Bed#: E5 -01 I Date of Admission: 4/23/2025   Date of Service: 4/25/2025 I Hospital Day: 2     Assessment & Plan  Multifocal pneumonia  Multiple hospitalizations and antibiotic therapy for treatment of multifocal pneumonia in an immunocompromised patient.  Prior hospitalization sputum culture positive for H. influenzae.  Completed 7 days of cefuroxime and 5 days of doxycycline.  Patient discharged and returned with fever, worsening of his shortness of breath, and productive cough  CT chest w co showing irregular lower lobe opacities, bronchial wall thickening and mucous plugging in the lower lobes  Currently on cefepime and vancomycin   Repeat sputum culture pending  MRSA swab ordered  Bronchoscopy today with BAL   CLL (chronic lymphocytic leukemia) (HCC)  Known to Dr. Mejia, patient on ibrutinib and prednisone daily  Also receiving IVIG and Gazyva every 4 weeks  Last dose of IVIG 4/11/2024  Chronic obstructive pulmonary disease (HCC)  Patient with documented history of chronic bronchitis/COPD  FEV1/FVC 73%, FEV1 3.28 L/89%, FVC 4.47 L/92%.  TLC 97%, %.  DLCO 76%  Home inhaler, Dulera and albuterol MDI as needed  Pulmonary nodule  7 mm left upper lobe groundglass nodule with no associated solid component, increased from 4 mm in 2022. Follow-up noncontrast chest CT is recommended in 12 months.     24 Hour Events : No acute events overnight, remains on room air.  Plan for bronchoscopy today at 1 PM    Objective :  Temp:  [98.1 °F (36.7 °C)-98.7 °F (37.1 °C)] 98.7 °F (37.1 °C)  BP: (116-149)/(81-87) 149/81  Resp:  [18-19] 19  O2 Device: None (Room air)    Physical Exam:  General: No acute distress  HENT: Normocephalic, atraumatic.  PERRL, EOMI, Moist mucous membranes.  Neck: Supple  Lungs: diffuse rhonchi.  On room air  Heart: Regular rate and rhythm, S1-S2 present, no murmurs rubs or gallops  Extremities: Warm and  well perfused, no cyanosis, clubbing, or edema  Skin: Warm, dry, no rashes or lesions  Neurologic: No focal neurologic deficits      Lab Results: I have reviewed the following results:   .     04/25/25  0526   WBC 4.81   HGB 12.4   HCT 37.5   *   SODIUM 138   K 3.6      CO2 22   BUN 8   CREATININE 0.81   GLUC 91     ABG: No new results in last 24 hours.    Imaging:   CT chest w/ co 4/23/2025  IMPRESSION:  1) Irregular lower lobe opacities in the central, partially groundglass opacity in the right upper lobe, increased from 4/9/2025. These findings likely represent worsening multifocal pneumonia. Other considerations include pneumonitis.     2) Some reticular opacities in the lower lobes and in the right upper lobe, as well as mild diffuse subpleural reticulation was also present on the 2022 CT, and may indicate a component of underlying interstitial lung disease. No honeycombing.      3) Bronchial wall thickening with some mucous plugging in the lower lobes     4) 7 mm left upper lobe groundglass nodule with no associated solid component, increased from 4 mm in 2022. This may represent an adenocarcinoma spectrum lesion. Follow-up noncontrast chest CT is recommended in 12 months.     5) Circumferential wall thickening of the distal esophagus, which may be related to esophagitis or gastroesophageal reflux.        CT chest w/o co 4/9/2025  IMPRESSION:  Multifocal pneumonia.   6 mm left upper lobe groundglass nodule. Based on current Fleischner Society 2017 Guidelines on incidental pulmonary nodule, follow-up noncontrast CT is recommended at 6-12 months from the initial examination to confirm persistence; if stable at that   time, additional follow-up CT is recommended for every 2 years until 5 years of stability is demonstrated.  Persistent distal esophageal thickening.     Other Study Results Review: Other studies reviewed include: TTE 4/11/2025  Left Ventricle Left ventricular cavity size, mild to  moderate concentric left ventricular ectopy, low normal left ventricular systolic function.  Abnormal septal motion otherwise no regional wall motion abnormality noted.  Ejection fraction is estimated as around 52%.  Diastolic function could not be reliably assessed due to frequent ectopy.   Right Ventricle Borderline dilated right ventricle cavity, normal right ventricular systolic function.   Left Atrium Top normal left atrial cavity size.   Right Atrium Normal right atrial cavity size.   Mitral Valve Normal mitral valve structure, mild mitral valve sclerosis and some focal calcification noted on the posterior mitral valve leaflet.  Adequate leaflet mobility. Trace mitral valve regurgitation.   Tricuspid Valve The tricuspid valve was not well visualized.  Trace tricuspid valve regurgitation.   Pulmonic Valve The pulmonic valve was not well visualized.  Trace pulmonic valve regurgitation.   Ascending Aorta Aortic root and proximal ascending aorta are normal in size on 2D imaging.   IVC/SVC The inferior vena cava is dilated. Respirophasic changes were normal.         PFT Results Reviewed:   04/13/2021- FEV1/FVC 73%, FEV1 3.28 L/89%, FVC 4.47 L/92%.  TLC 97%, %.  DLCO 76%.      Gerard Cabrera, DO  Pulmonary & Critical Care, PGY V

## 2025-04-25 NOTE — PLAN OF CARE
Problem: PAIN - ADULT  Goal: Verbalizes/displays adequate comfort level or baseline comfort level  Description: Interventions:- Encourage patient to monitor pain and request assistance- Assess pain using appropriate pain scale- Administer analgesics based on type and severity of pain and evaluate response- Implement non-pharmacological measures as appropriate and evaluate response- Consider cultural and social influences on pain and pain management- Notify physician/advanced practitioner if interventions unsuccessful or patient reports new pain  Outcome: Progressing     Problem: INFECTION - ADULT  Goal: Absence or prevention of progression during hospitalization  Description: INTERVENTIONS:- Assess and monitor for signs and symptoms of infection- Monitor lab/diagnostic results- Monitor all insertion sites, i.e. indwelling lines, tubes, and drains- Monitor endotracheal if appropriate and nasal secretions for changes in amount and color- [place] appropriate cooling/warming therapies per order- Administer medications as ordered- Instruct and encourage patient and family to use good hand hygiene technique- Identify and instruct in appropriate isolation precautions for identified infection/condition  Outcome: Progressing  Goal: Absence of fever/infection during neutropenic period  Description: INTERVENTIONS:- Monitor WBC  Outcome: Progressing     Problem: SAFETY ADULT  Goal: Patient will remain free of falls  Description: INTERVENTIONS:- Educate patient/family on patient safety including physical limitations- Instruct patient to call for assistance with activity - Consult OT/PT to assist with strengthening/mobility - Keep Call bell within reach- Keep bed low and locked with side rails adjusted as appropriate- Keep care items and personal belongings within reach- Initiate and maintain comfort rounds- Make Fall Risk Sign visible to staff- Offer Toileting every 2 Hours, in advance of need- Initiate/Maintain bed alarm-  Obtain necessary fall risk management equipment - Apply yellow socks and bracelet for high fall risk patients- Consider moving patient to room near nurses station  Outcome: Progressing     Problem: DISCHARGE PLANNING  Goal: Discharge to home or other facility with appropriate resources  Description: INTERVENTIONS:- Identify barriers to discharge w/patient and caregiver- Arrange for needed discharge resources and transportation as appropriate- Identify discharge learning needs (meds, wound care, etc.)- Arrange for interpretive services to assist at discharge as needed- Refer to Case Management Department for coordinating discharge planning if the patient needs post-hospital services based on physician/advanced practitioner order or complex needs related to functional status, cognitive ability, or social support system  Outcome: Progressing     Problem: Knowledge Deficit  Goal: Patient/family/caregiver demonstrates understanding of disease process, treatment plan, medications, and discharge instructions  Description: Complete learning assessment and assess knowledge base.Interventions:- Provide teaching at level of understanding- Provide teaching via preferred learning methods  Outcome: Progressing     Problem: RESPIRATORY - ADULT  Goal: Achieves optimal ventilation and oxygenation  Description: INTERVENTIONS:- Assess for changes in respiratory status- Assess for changes in mentation and behavior- Position to facilitate oxygenation and minimize respiratory effort- Oxygen administered by appropriate delivery if ordered- Initiate smoking cessation education as indicated- Encourage broncho-pulmonary hygiene including cough, deep breathe, Incentive Spirometry- Assess the need for suctioning and aspirate as needed- Assess and instruct to report SOB or any respiratory difficulty- Respiratory Therapy support as indicated  Outcome: Progressing     Problem: METABOLIC, FLUID AND ELECTROLYTES - ADULT  Goal: Electrolytes  maintained within normal limits  Description: INTERVENTIONS:- Monitor labs and assess patient for signs and symptoms of electrolyte imbalances- Administer electrolyte replacement as ordered- Monitor response to electrolyte replacements, including repeat lab results as appropriate- Instruct patient on fluid and nutrition as appropriate  Outcome: Progressing  Goal: Glucose maintained within target range  Description: INTERVENTIONS:- Monitor Blood Glucose as ordered- Assess for signs and symptoms of hyperglycemia and hypoglycemia- Administer ordered medications to maintain glucose within target range- Assess nutritional intake and initiate nutrition service referral as needed  Outcome: Progressing     Problem: Nutrition/Hydration-ADULT  Goal: Nutrient/Hydration intake appropriate for improving, restoring or maintaining nutritional needs  Description: Monitor and assess patient's nutrition/hydration status for malnutrition. Collaborate with interdisciplinary team and initiate plan and interventions as ordered.  Monitor patient's weight and dietary intake as ordered or per policy. Utilize nutrition screening tool and intervene as necessary. Determine patient's food preferences and provide high-protein, high-caloric foods as appropriate. INTERVENTIONS:- Monitor oral intake, urinary output, labs, and treatment plans- Assess nutrition and hydration status and recommend course of action- Evaluate amount of meals eaten- Assist patient with eating if necessary - Allow adequate time for meals- Recommend/ encourage appropriate diets, oral nutritional supplements, and vitamin/mineral supplements- Order, calculate, and assess calorie counts as needed- Recommend, monitor, and adjust tube feedings and TPN/PPN based on assessed needs- Assess need for intravenous fluids- Provide specific nutrition/hydration education as appropriate- Include patient/family/caregiver in decisions related to nutrition  Outcome: Progressing     Problem:  Prexisting or High Potential for Compromised Skin Integrity  Goal: Skin integrity is maintained or improved  Description: INTERVENTIONS:- Identify patients at risk for skin breakdown- Assess and monitor skin integrity- Assess and monitor nutrition and hydration status- Monitor labs - Assess for incontinence - Turn and reposition patient- Assist with mobility/ambulation- Relieve pressure over bony prominences- Avoid friction and shearing- Provide appropriate hygiene as needed including keeping skin clean and dry- Evaluate need for skin moisturizer/barrier cream- Collaborate with interdisciplinary team - Patient/family teaching- Consider wound care consult   Outcome: Progressing

## 2025-04-25 NOTE — NURSING NOTE
Pt stated to RN that personal blood glucose tracking sal showed his bg was 90 and requested glucose tablet due to pt NPO status. RN began to explain hypoglycemic protocol when pt began cursing and yelling at RN. RN attempted redirection when pt demanded to speak to someone else and then threw his cup across the room. RN notified charge RN and SLIM.

## 2025-04-26 LAB
ANION GAP SERPL CALCULATED.3IONS-SCNC: 8 MMOL/L (ref 4–13)
BACTERIA SPT RESP CULT: ABNORMAL
BUN SERPL-MCNC: 13 MG/DL (ref 5–25)
CALCIUM SERPL-MCNC: 9.2 MG/DL (ref 8.4–10.2)
CHLORIDE SERPL-SCNC: 108 MMOL/L (ref 96–108)
CO2 SERPL-SCNC: 26 MMOL/L (ref 21–32)
CREAT SERPL-MCNC: 0.93 MG/DL (ref 0.6–1.3)
ERYTHROCYTE [DISTWIDTH] IN BLOOD BY AUTOMATED COUNT: 13.8 % (ref 11.6–15.1)
GFR SERPL CREATININE-BSD FRML MDRD: 82 ML/MIN/1.73SQ M
GLUCOSE SERPL-MCNC: 120 MG/DL (ref 65–140)
GLUCOSE SERPL-MCNC: 127 MG/DL (ref 65–140)
GLUCOSE SERPL-MCNC: 137 MG/DL (ref 65–140)
GLUCOSE SERPL-MCNC: 161 MG/DL (ref 65–140)
GLUCOSE SERPL-MCNC: 162 MG/DL (ref 65–140)
GLUCOSE SERPL-MCNC: 221 MG/DL (ref 65–140)
GRAM STN SPEC: ABNORMAL
HCT VFR BLD AUTO: 37.5 % (ref 36.5–49.3)
HGB BLD-MCNC: 12.6 G/DL (ref 12–17)
MCH RBC QN AUTO: 30.7 PG (ref 26.8–34.3)
MCHC RBC AUTO-ENTMCNC: 33.6 G/DL (ref 31.4–37.4)
MCV RBC AUTO: 92 FL (ref 82–98)
PLATELET # BLD AUTO: 171 THOUSANDS/UL (ref 149–390)
PMV BLD AUTO: 11.3 FL (ref 8.9–12.7)
POTASSIUM SERPL-SCNC: 3.8 MMOL/L (ref 3.5–5.3)
RBC # BLD AUTO: 4.1 MILLION/UL (ref 3.88–5.62)
RHODAMINE-AURAMINE STN SPEC: NORMAL
RHODAMINE-AURAMINE STN SPEC: NORMAL
SODIUM SERPL-SCNC: 142 MMOL/L (ref 135–147)
WBC # BLD AUTO: 5.88 THOUSAND/UL (ref 4.31–10.16)

## 2025-04-26 PROCEDURE — 82948 REAGENT STRIP/BLOOD GLUCOSE: CPT

## 2025-04-26 PROCEDURE — 85027 COMPLETE CBC AUTOMATED: CPT | Performed by: INTERNAL MEDICINE

## 2025-04-26 PROCEDURE — 80048 BASIC METABOLIC PNL TOTAL CA: CPT | Performed by: INTERNAL MEDICINE

## 2025-04-26 PROCEDURE — 99232 SBSQ HOSP IP/OBS MODERATE 35: CPT | Performed by: INTERNAL MEDICINE

## 2025-04-26 RX ORDER — INSULIN GLARGINE 100 [IU]/ML
12 INJECTION, SOLUTION SUBCUTANEOUS
Status: DISCONTINUED | OUTPATIENT
Start: 2025-04-26 | End: 2025-04-27 | Stop reason: HOSPADM

## 2025-04-26 RX ORDER — VANCOMYCIN HYDROCHLORIDE 1 G/200ML
1000 INJECTION, SOLUTION INTRAVENOUS EVERY 12 HOURS
Status: DISCONTINUED | OUTPATIENT
Start: 2025-04-26 | End: 2025-04-27

## 2025-04-26 RX ADMIN — CEFEPIME 2000 MG: 2 INJECTION, POWDER, FOR SOLUTION INTRAVENOUS at 10:12

## 2025-04-26 RX ADMIN — FLUTICASONE FUROATE AND VILANTEROL TRIFENATATE 1 PUFF: 200; 25 POWDER RESPIRATORY (INHALATION) at 07:30

## 2025-04-26 RX ADMIN — CEFEPIME 2000 MG: 2 INJECTION, POWDER, FOR SOLUTION INTRAVENOUS at 21:54

## 2025-04-26 RX ADMIN — GUAIFENESIN AND DEXTROMETHORPHAN 10 ML: 100; 10 SYRUP ORAL at 04:55

## 2025-04-26 RX ADMIN — INSULIN LISPRO 1 UNITS: 100 INJECTION, SOLUTION INTRAVENOUS; SUBCUTANEOUS at 16:25

## 2025-04-26 RX ADMIN — GUAIFENESIN AND DEXTROMETHORPHAN 10 ML: 100; 10 SYRUP ORAL at 21:54

## 2025-04-26 RX ADMIN — EZETIMIBE 10 MG: 10 TABLET ORAL at 21:54

## 2025-04-26 RX ADMIN — GUAIFENESIN 600 MG: 600 TABLET ORAL at 08:45

## 2025-04-26 RX ADMIN — ASPIRIN 81 MG: 81 TABLET, CHEWABLE ORAL at 08:45

## 2025-04-26 RX ADMIN — FOLIC ACID TAB 400 MCG 800 MCG: 400 TAB at 08:45

## 2025-04-26 RX ADMIN — HYDROCHLOROTHIAZIDE 12.5 MG: 12.5 TABLET ORAL at 08:46

## 2025-04-26 RX ADMIN — AMLODIPINE BESYLATE 10 MG: 10 TABLET ORAL at 08:45

## 2025-04-26 RX ADMIN — Medication 3 MG: at 21:54

## 2025-04-26 RX ADMIN — VANCOMYCIN HYDROCHLORIDE 1000 MG: 1 INJECTION, SOLUTION INTRAVENOUS at 19:54

## 2025-04-26 RX ADMIN — PANTOPRAZOLE SODIUM 40 MG: 40 TABLET, DELAYED RELEASE ORAL at 05:00

## 2025-04-26 RX ADMIN — INSULIN LISPRO 2 UNITS: 100 INJECTION, SOLUTION INTRAVENOUS; SUBCUTANEOUS at 12:21

## 2025-04-26 RX ADMIN — ACYCLOVIR 400 MG: 800 TABLET ORAL at 17:50

## 2025-04-26 RX ADMIN — VANCOMYCIN HYDROCHLORIDE 750 MG: 750 INJECTION, SOLUTION INTRAVENOUS at 07:30

## 2025-04-26 RX ADMIN — GABAPENTIN 600 MG: 300 CAPSULE ORAL at 08:45

## 2025-04-26 RX ADMIN — ACYCLOVIR 400 MG: 800 TABLET ORAL at 08:46

## 2025-04-26 RX ADMIN — PREDNISONE 5 MG: 5 TABLET ORAL at 08:45

## 2025-04-26 RX ADMIN — LOSARTAN POTASSIUM 100 MG: 50 TABLET, FILM COATED ORAL at 08:45

## 2025-04-26 RX ADMIN — GUAIFENESIN 600 MG: 600 TABLET ORAL at 17:50

## 2025-04-26 RX ADMIN — CITALOPRAM HYDROBROMIDE 40 MG: 20 TABLET ORAL at 08:46

## 2025-04-26 NOTE — ASSESSMENT & PLAN NOTE
Lab Results   Component Value Date    HGBA1C 6.6 (A) 01/29/2025       Recent Labs     04/25/25 1952 04/25/25 2024 04/25/25 2139 04/26/25  0714   POCGLU 72 75 104 127       Blood Sugar Average: Last 72 hrs:  (P) [id number]  Blood sugar has been optimally controlled  Will decrease Lantus to 12 units daily, and will discontinue mealtime insulin as of 4/20  Will continue to monitor and make adjustments as needed

## 2025-04-26 NOTE — ASSESSMENT & PLAN NOTE
Multiple hospitalizations and antibiotic therapy for treatment of multifocal pneumonia in an immunocompromised patient.  Prior hospitalization sputum culture positive for H. influenzae.  Completed 7 days of cefuroxime and 5 days of doxycycline.  Patient discharged and returned with fever, worsening of his shortness of breath, and productive cough  CT chest w co showing irregular lower lobe opacities, bronchial wall thickening and mucous plugging in the lower lobes  Currently on cefepime and vancomycin day #3  Repeat sputum culture pending  MRSA swab ordered  Bronchoscopy results pending. Clinically, he is much improved.

## 2025-04-26 NOTE — PROGRESS NOTES
Progress Note - Pulmonology   Name: Cayetano Lucero 69 y.o. male I MRN: [id number]  Unit/Bed#: E5 -01 I Date of Admission: 4/23/2025   Date of Service: 4/26/2025 I Hospital Day: 3    Assessment & Plan  Multifocal pneumonia  Multiple hospitalizations and antibiotic therapy for treatment of multifocal pneumonia in an immunocompromised patient.  Prior hospitalization sputum culture positive for H. influenzae.  Completed 7 days of cefuroxime and 5 days of doxycycline.  Patient discharged and returned with fever, worsening of his shortness of breath, and productive cough  CT chest w co showing irregular lower lobe opacities, bronchial wall thickening and mucous plugging in the lower lobes  Currently on cefepime and vancomycin day #3  Repeat sputum culture pending  MRSA swab ordered  Bronchoscopy results pending. Clinically, he is much improved.   CLL (chronic lymphocytic leukemia) (HCC)  Known to Dr. Mejia, patient on ibrutinib and prednisone daily  Also receiving IVIG and Gazyva every 4 weeks  Last dose of IVIG 4/11/2025  Chronic obstructive pulmonary disease (HCC)  Patient with documented history of chronic bronchitis/COPD  FEV1/FVC 73%, FEV1 3.28 L/89%, FVC 4.47 L/92%.  TLC 97%, %.  DLCO 76%  Home inhaler, Dulera and albuterol MDI as needed  Pulmonary nodule  7 mm left upper lobe groundglass nodule with no associated solid component, increased from 4 mm in 2022. Follow-up noncontrast chest CT is recommended in 12 months.     Discussed with pt. Concerns addressed.    24 Hour Events : Tolerated bronchoscopy and was noted to have thick white secretions throughout the airways, BAL performed x 2.    Subjective : Found lying in bed. Cough is improved, barely coughing and when he does it is nonproductive. Less winded. Walking hallways and feels less SOB than on arrival. Feels well, asking when he can be d/c'd.     Objective :  Temp:  [98 °F (36.7 °C)] 98 °F (36.7 °C)  HR:  [78-81] 79  BP: (107-158)/(52-85)  158/85  Resp:  [18-24] 18  SpO2:  [93 %-97 %] 94 %  O2 Device: None (Room air)    Physical Exam  GEN: WDWN, nad, comfortable  HEENT: NCAT, EOMI  LUNGS: clear b/l bs  ABD: soft  PSYCH: calm, cooperative      Lab Results: I have reviewed the following results:   .     04/26/25  0452   WBC 5.88   HGB 12.6   HCT 37.5      SODIUM 142   K 3.8      CO2 26   BUN 13   CREATININE 0.93   GLUC 120     Labs per my review show normal CBC, normal BMP    4/25/25 bronch culture: Pending    4/23/25 CT chest per my review shows bronchial wall thickening with mucous plugging in the bilateral lower lobes, opacities in the bilateral lower lobes worse compared to/9/25; mild subpleural reticulation worse in the right upper lobe and bilateral lower lobes, 7 mm left upper lobe nodule, multiple other nodules    Echo 4/11/2025: Mild to moderate concentric left ventricular hypertrophy with abnormal septal motion and EF of 52%    PFT Results Reviewed: reviewed and interpreted    4/23/21 per my review shows normal lung volumes, no obstruction, normal diffusion

## 2025-04-26 NOTE — PLAN OF CARE
Problem: PAIN - ADULT  Goal: Verbalizes/displays adequate comfort level or baseline comfort level  Description: Interventions:- Encourage patient to monitor pain and request assistance- Assess pain using appropriate pain scale- Administer analgesics based on type and severity of pain and evaluate response- Implement non-pharmacological measures as appropriate and evaluate response- Consider cultural and social influences on pain and pain management- Notify physician/advanced practitioner if interventions unsuccessful or patient reports new pain  Outcome: Progressing     Problem: INFECTION - ADULT  Goal: Absence or prevention of progression during hospitalization  Description: INTERVENTIONS:- Assess and monitor for signs and symptoms of infection- Monitor lab/diagnostic results- Monitor all insertion sites, i.e. indwelling lines, tubes, and drains- Monitor endotracheal if appropriate and nasal secretions for changes in amount and color- [place] appropriate cooling/warming therapies per order- Administer medications as ordered- Instruct and encourage patient and family to use good hand hygiene technique- Identify and instruct in appropriate isolation precautions for identified infection/condition  Outcome: Progressing  Goal: Absence of fever/infection during neutropenic period  Description: INTERVENTIONS:- Monitor WBC  Outcome: Progressing     Problem: SAFETY ADULT  Goal: Patient will remain free of falls  Description: INTERVENTIONS:- Educate patient/family on patient safety including physical limitations- Instruct patient to call for assistance with activity - Consult OT/PT to assist with strengthening/mobility - Keep Call bell within reach- Keep bed low and locked with side rails adjusted as appropriate- Keep care items and personal belongings within reach- Initiate and maintain comfort rounds- Make Fall Risk Sign visible to staff- Offer Toileting every 2 Hours, in advance of need- Initiate/Maintain bed alarm-  Obtain necessary fall risk management equipment - Apply yellow socks and bracelet for high fall risk patients- Consider moving patient to room near nurses station  Outcome: Progressing     Problem: DISCHARGE PLANNING  Goal: Discharge to home or other facility with appropriate resources  Description: INTERVENTIONS:- Identify barriers to discharge w/patient and caregiver- Arrange for needed discharge resources and transportation as appropriate- Identify discharge learning needs (meds, wound care, etc.)- Arrange for interpretive services to assist at discharge as needed- Refer to Case Management Department for coordinating discharge planning if the patient needs post-hospital services based on physician/advanced practitioner order or complex needs related to functional status, cognitive ability, or social support system  Outcome: Progressing     Problem: Knowledge Deficit  Goal: Patient/family/caregiver demonstrates understanding of disease process, treatment plan, medications, and discharge instructions  Description: Complete learning assessment and assess knowledge base.Interventions:- Provide teaching at level of understanding- Provide teaching via preferred learning methods  Outcome: Progressing     Problem: RESPIRATORY - ADULT  Goal: Achieves optimal ventilation and oxygenation  Description: INTERVENTIONS:- Assess for changes in respiratory status- Assess for changes in mentation and behavior- Position to facilitate oxygenation and minimize respiratory effort- Oxygen administered by appropriate delivery if ordered- Initiate smoking cessation education as indicated- Encourage broncho-pulmonary hygiene including cough, deep breathe, Incentive Spirometry- Assess the need for suctioning and aspirate as needed- Assess and instruct to report SOB or any respiratory difficulty- Respiratory Therapy support as indicated  Outcome: Progressing     Problem: METABOLIC, FLUID AND ELECTROLYTES - ADULT  Goal: Electrolytes  maintained within normal limits  Description: INTERVENTIONS:- Monitor labs and assess patient for signs and symptoms of electrolyte imbalances- Administer electrolyte replacement as ordered- Monitor response to electrolyte replacements, including repeat lab results as appropriate- Instruct patient on fluid and nutrition as appropriate  Outcome: Progressing  Goal: Glucose maintained within target range  Description: INTERVENTIONS:- Monitor Blood Glucose as ordered- Assess for signs and symptoms of hyperglycemia and hypoglycemia- Administer ordered medications to maintain glucose within target range- Assess nutritional intake and initiate nutrition service referral as needed  Outcome: Progressing     Problem: Nutrition/Hydration-ADULT  Goal: Nutrient/Hydration intake appropriate for improving, restoring or maintaining nutritional needs  Description: Monitor and assess patient's nutrition/hydration status for malnutrition. Collaborate with interdisciplinary team and initiate plan and interventions as ordered.  Monitor patient's weight and dietary intake as ordered or per policy. Utilize nutrition screening tool and intervene as necessary. Determine patient's food preferences and provide high-protein, high-caloric foods as appropriate. INTERVENTIONS:- Monitor oral intake, urinary output, labs, and treatment plans- Assess nutrition and hydration status and recommend course of action- Evaluate amount of meals eaten- Assist patient with eating if necessary - Allow adequate time for meals- Recommend/ encourage appropriate diets, oral nutritional supplements, and vitamin/mineral supplements- Order, calculate, and assess calorie counts as needed- Recommend, monitor, and adjust tube feedings and TPN/PPN based on assessed needs- Assess need for intravenous fluids- Provide specific nutrition/hydration education as appropriate- Include patient/family/caregiver in decisions related to nutrition  Outcome: Progressing     Problem:  Prexisting or High Potential for Compromised Skin Integrity  Goal: Skin integrity is maintained or improved  Description: INTERVENTIONS:- Identify patients at risk for skin breakdown- Assess and monitor skin integrity- Assess and monitor nutrition and hydration status- Monitor labs - Assess for incontinence - Turn and reposition patient- Assist with mobility/ambulation- Relieve pressure over bony prominences- Avoid friction and shearing- Provide appropriate hygiene as needed including keeping skin clean and dry- Evaluate need for skin moisturizer/barrier cream- Collaborate with interdisciplinary team - Patient/family teaching- Consider wound care consult   Outcome: Progressing

## 2025-04-26 NOTE — PROGRESS NOTES
Cayetano Lucero is a 69 y.o. male who is currently ordered Vancomycin IV with management by the Pharmacy Consult service.  Relevant clinical data and objective / subjective history reviewed.  Vancomycin Assessment:  Indication and Goal AUC/Trough: Pneumonia (goal -600, trough >10)  Clinical Status: stable  Micro:       4. Renal Function:  SCr: 0.93 mg/dL  CrCl: 83.5 mL/min  Renal replacement: Not on dialysis  Days of Therapy: 3  Current Dose: 750 mg UV q8h  Vancomycin Plan:  New Dosin mg IV q12h   Estimated AUC: 450 mcg*hr/mL  Estimated Trough: 13 mcg/mL  Next Level: /2 with AM labs   Renal Function Monitoring: Daily BMP and UOP  Pharmacy will continue to follow closely for s/sx of nephrotoxicity, infusion reactions and appropriateness of therapy.  BMP and CBC will be ordered per protocol. We will continue to follow the patient’s culture results and clinical progress daily.    Yu Bowling, Pharmacist

## 2025-04-26 NOTE — ASSESSMENT & PLAN NOTE
"Continue home regimen of amlodipine, HCTZ, and losartan  /85 (BP Location: Right arm)   Pulse 79   Temp 98 °F (36.7 °C) (Oral)   Resp 18   Ht 6' 1\" (1.854 m)   Wt 93.8 kg (206 lb 12.7 oz)   SpO2 94%   BMI 27.28 kg/m²     "

## 2025-04-26 NOTE — ASSESSMENT & PLAN NOTE
7 mm left upper lobe groundglass nodule with no associated solid component, increased from 4 mm in 2022. Follow-up noncontrast chest CT is recommended in 12 months.     Discussed with pt. Concerns addressed.

## 2025-04-26 NOTE — PLAN OF CARE
Problem: PAIN - ADULT  Goal: Verbalizes/displays adequate comfort level or baseline comfort level  Description: Interventions:- Encourage patient to monitor pain and request assistance- Assess pain using appropriate pain scale- Administer analgesics based on type and severity of pain and evaluate response- Implement non-pharmacological measures as appropriate and evaluate response- Consider cultural and social influences on pain and pain management- Notify physician/advanced practitioner if interventions unsuccessful or patient reports new pain  Outcome: Progressing     Problem: INFECTION - ADULT  Goal: Absence or prevention of progression during hospitalization  Description: INTERVENTIONS:- Assess and monitor for signs and symptoms of infection- Monitor lab/diagnostic results- Monitor all insertion sites, i.e. indwelling lines, tubes, and drains- Monitor endotracheal if appropriate and nasal secretions for changes in amount and color- [place] appropriate cooling/warming therapies per order- Administer medications as ordered- Instruct and encourage patient and family to use good hand hygiene technique- Identify and instruct in appropriate isolation precautions for identified infection/condition  Outcome: Progressing  Goal: Absence of fever/infection during neutropenic period  Description: INTERVENTIONS:- Monitor WBC  Outcome: Progressing     Problem: SAFETY ADULT  Goal: Patient will remain free of falls  Description: INTERVENTIONS:- Educate patient/family on patient safety including physical limitations- Instruct patient to call for assistance with activity - Consult OT/PT to assist with strengthening/mobility - Keep Call bell within reach- Keep bed low and locked with side rails adjusted as appropriate- Keep care items and personal belongings within reach- Initiate and maintain comfort rounds- Make Fall Risk Sign visible to staff- Offer Toileting every 2 Hours, in advance of need- Initiate/Maintain bed alarm-  Obtain necessary fall risk management equipment - Apply yellow socks and bracelet for high fall risk patients- Consider moving patient to room near nurses station  Outcome: Progressing     Problem: DISCHARGE PLANNING  Goal: Discharge to home or other facility with appropriate resources  Description: INTERVENTIONS:- Identify barriers to discharge w/patient and caregiver- Arrange for needed discharge resources and transportation as appropriate- Identify discharge learning needs (meds, wound care, etc.)- Arrange for interpretive services to assist at discharge as needed- Refer to Case Management Department for coordinating discharge planning if the patient needs post-hospital services based on physician/advanced practitioner order or complex needs related to functional status, cognitive ability, or social support system  Outcome: Progressing     Problem: Knowledge Deficit  Goal: Patient/family/caregiver demonstrates understanding of disease process, treatment plan, medications, and discharge instructions  Description: Complete learning assessment and assess knowledge base.Interventions:- Provide teaching at level of understanding- Provide teaching via preferred learning methods  Outcome: Progressing     Problem: RESPIRATORY - ADULT  Goal: Achieves optimal ventilation and oxygenation  Description: INTERVENTIONS:- Assess for changes in respiratory status- Assess for changes in mentation and behavior- Position to facilitate oxygenation and minimize respiratory effort- Oxygen administered by appropriate delivery if ordered- Initiate smoking cessation education as indicated- Encourage broncho-pulmonary hygiene including cough, deep breathe, Incentive Spirometry- Assess the need for suctioning and aspirate as needed- Assess and instruct to report SOB or any respiratory difficulty- Respiratory Therapy support as indicated  Outcome: Progressing     Problem: METABOLIC, FLUID AND ELECTROLYTES - ADULT  Goal: Electrolytes  maintained within normal limits  Description: INTERVENTIONS:- Monitor labs and assess patient for signs and symptoms of electrolyte imbalances- Administer electrolyte replacement as ordered- Monitor response to electrolyte replacements, including repeat lab results as appropriate- Instruct patient on fluid and nutrition as appropriate  Outcome: Progressing  Goal: Glucose maintained within target range  Description: INTERVENTIONS:- Monitor Blood Glucose as ordered- Assess for signs and symptoms of hyperglycemia and hypoglycemia- Administer ordered medications to maintain glucose within target range- Assess nutritional intake and initiate nutrition service referral as needed  Outcome: Progressing     Problem: Nutrition/Hydration-ADULT  Goal: Nutrient/Hydration intake appropriate for improving, restoring or maintaining nutritional needs  Description: Monitor and assess patient's nutrition/hydration status for malnutrition. Collaborate with interdisciplinary team and initiate plan and interventions as ordered.  Monitor patient's weight and dietary intake as ordered or per policy. Utilize nutrition screening tool and intervene as necessary. Determine patient's food preferences and provide high-protein, high-caloric foods as appropriate. INTERVENTIONS:- Monitor oral intake, urinary output, labs, and treatment plans- Assess nutrition and hydration status and recommend course of action- Evaluate amount of meals eaten- Assist patient with eating if necessary - Allow adequate time for meals- Recommend/ encourage appropriate diets, oral nutritional supplements, and vitamin/mineral supplements- Order, calculate, and assess calorie counts as needed- Recommend, monitor, and adjust tube feedings and TPN/PPN based on assessed needs- Assess need for intravenous fluids- Provide specific nutrition/hydration education as appropriate- Include patient/family/caregiver in decisions related to nutrition  Outcome: Progressing     Problem:  Prexisting or High Potential for Compromised Skin Integrity  Goal: Skin integrity is maintained or improved  Description: INTERVENTIONS:- Identify patients at risk for skin breakdown- Assess and monitor skin integrity- Assess and monitor nutrition and hydration status- Monitor labs - Assess for incontinence - Turn and reposition patient- Assist with mobility/ambulation- Relieve pressure over bony prominences- Avoid friction and shearing- Provide appropriate hygiene as needed including keeping skin clean and dry- Evaluate need for skin moisturizer/barrier cream- Collaborate with interdisciplinary team - Patient/family teaching- Consider wound care consult   Outcome: Progressing

## 2025-04-26 NOTE — PROGRESS NOTES
"Progress Note - Hospitalist   Name: Cayetano Lucero 69 y.o. male I MRN: [id number]  Unit/Bed#: E5 -01 I Date of Admission: 4/23/2025   Date of Service: 4/26/2025 I Hospital Day: 3    Assessment & Plan  Multifocal pneumonia  Pt presented to the ED secondary to episode of chills with worsening shortness of breath and productive cough. Pt currently on RA.  Of note, pt recently hospitalized for this issue 4/9-4/13. Treated with IV ceftriaxone x 4 days and transitioned to oral cefuroxime to complete 7 day course. Also treated with doxy x 5 days.   CT chest today demonstrating:  Irregular lower lobe opacities in the central, partially groundglass opacity in the right upper lobe, increased from 4/9/2025. These findings likely represent worsening multifocal pneumonia. Other considerations include pneumonitis.   Some reticular opacities in the lower lobes and in the right upper lobe... may indicate a component of underlying interstitial lung disease. No honeycombing.   Bronchial wall thickening with some mucous plugging in the lower lobes, likely also related to the aforementioned process.   WBC 8.85, not fulfilling SIRS criteria  Will treat with broad ABX for now given worsening and immunocompromised state, IV cefepime + vancomycin  Appreciate pulmonary recommendations  S/p bronchoscopy- await cultures     CLL (chronic lymphocytic leukemia) (HCC)  Known to Dr. Mejia, patient on ibrutinib and prednisone daily  Also receiving IVIG and Gazyva every 4 weeks  Continue outpatient follow-up with hem/onc, last seen 4/16  Chronic obstructive pulmonary disease (HCC)  No acute exacerbation  Continue home inhaler regimen  Hyperlipidemia  Continue zetia  Hypertension  Continue home regimen of amlodipine, HCTZ, and losartan  /85 (BP Location: Right arm)   Pulse 79   Temp 98 °F (36.7 °C) (Oral)   Resp 18   Ht 6' 1\" (1.854 m)   Wt 93.8 kg (206 lb 12.7 oz)   SpO2 94%   BMI 27.28 kg/m²     Type 2 diabetes mellitus, with " "long-term current use of insulin ([facility])  Lab Results   Component Value Date    HGBA1C 6.6 (A) 01/29/2025       Recent Labs     04/25/25  1952 04/25/25 2024 04/25/25 2139 04/26/25  0714   POCGLU 72 75 104 127       Blood Sugar Average: Last 72 hrs:  (P) [id number]  Blood sugar has been optimally controlled  Will decrease Lantus to 12 units daily, and will discontinue mealtime insulin as of 4/20  Will continue to monitor and make adjustments as needed       Pulmonary nodule  CT chest demonstrating \"7 mm left upper lobe groundglass nodule with no associated solid component, increased from 4 mm in 2022. This may represent an adenocarcinoma spectrum lesion. Follow-up noncontrast chest CT is recommended in 12 months.\"  Outpatient follow-up  Esophageal reflux  CT chest demonstrating \"Circumferential wall thickening of the distal esophagus, which may be related to esophagitis or gastroesophageal reflux.\"  Continue PPI    VTE Pharmacologic Prophylaxis: VTE Score: 4 Moderate Risk (Score 3-4) - Pharmacological DVT Prophylaxis Ordered: aspirin, allergic to heparin , also patient is ambulating .    Mobility:   Basic Mobility Inpatient Raw Score: 23  JH-HLM Goal: 7: Walk 25 feet or more  JH-HLM Achieved: 8: Walk 250 feet ot more  JH-HLM Goal achieved. Continue to encourage appropriate mobility.    Patient Centered Rounds: I performed bedside rounds with nursing staff today.   Discussions with Specialists or Other Care Team Provider: yes    Education and Discussions with Family / Patient: Updated  (wife) via phone.    Current Length of Stay: 3 day(s)  Current Patient Status: Inpatient   Certification Statement: The patient will continue to require additional inpatient hospital stay due to pending evaluation  Discharge Plan: Anticipate discharge in 48-72 hrs to home.    Code Status: Level 1 - Full Code    Subjective   Seen and examined at bedside  Awake and alert  Comfortable on room air  Denies any " complaints  No overnight events reported    Objective :  Temp:  [98 °F (36.7 °C)] 98 °F (36.7 °C)  HR:  [78-85] 79  BP: (107-158)/(52-86) 158/85  Resp:  [18-24] 18  SpO2:  [93 %-97 %] 94 %  O2 Device: None (Room air)    Body mass index is 27.28 kg/m².     Input and Output Summary (last 24 hours):     Intake/Output Summary (Last 24 hours) at 4/26/2025 1200  Last data filed at 4/26/2025 0954  Gross per 24 hour   Intake 800 ml   Output --   Net 800 ml       Physical Exam  Vitals and nursing note reviewed.   Constitutional:       General: He is not in acute distress.     Appearance: He is well-developed.   HENT:      Head: Normocephalic and atraumatic.   Eyes:      Conjunctiva/sclera: Conjunctivae normal.   Cardiovascular:      Rate and Rhythm: Normal rate and regular rhythm.      Heart sounds: No murmur heard.  Pulmonary:      Effort: No respiratory distress.      Breath sounds: Rhonchi present.   Abdominal:      Palpations: Abdomen is soft.      Tenderness: There is no abdominal tenderness.   Musculoskeletal:         General: No swelling.      Cervical back: Neck supple.   Skin:     General: Skin is warm and dry.      Capillary Refill: Capillary refill takes less than 2 seconds.      Findings: Bruising and erythema present.   Neurological:      Mental Status: He is alert.   Psychiatric:         Mood and Affect: Mood normal.           Lines/Drains:              Lab Results: I have reviewed the following results:   Results from last 7 days   Lab Units 04/26/25 0452 04/25/25 0526 04/23/25  1756   WBC Thousand/uL 5.88   < > 8.85   HEMOGLOBIN g/dL 12.6   < > 13.9   HEMATOCRIT % 37.5   < > 42.8   PLATELETS Thousands/uL 171   < > 184   LYMPHO PCT %  --   --  2*   MONO PCT %  --   --  12   EOS PCT %  --   --  0    < > = values in this interval not displayed.     Results from last 7 days   Lab Units 04/26/25  0452 04/25/25  0526 04/23/25  1756   SODIUM mmol/L 142   < > 140   POTASSIUM mmol/L 3.8   < > 3.8   CHLORIDE mmol/L  108   < > 104   CO2 mmol/L 26   < > 27   BUN mg/dL 13   < > 16   CREATININE mg/dL 0.93   < > 1.02   ANION GAP mmol/L 8   < > 9   CALCIUM mg/dL 9.2   < > 9.3   ALBUMIN g/dL  --   --  4.4   TOTAL BILIRUBIN mg/dL  --   --  1.08*   ALK PHOS U/L  --   --  42   ALT U/L  --   --  28   AST U/L  --   --  26   GLUCOSE RANDOM mg/dL 120   < > 125    < > = values in this interval not displayed.         Results from last 7 days   Lab Units 04/26/25  0714 04/25/25  2139 04/25/25  2024 04/25/25  1952 04/25/25  1703 04/25/25  1131 04/25/25  0729 04/25/25  0606 04/24/25  2155 04/24/25  1609 04/24/25  1149 04/24/25  0713   POC GLUCOSE mg/dl 127 104 75 72 116 134 108 103 109 123 87 112         Results from last 7 days   Lab Units 04/25/25  0526 04/23/25  1756   PROCALCITONIN ng/ml 0.09 0.12       Recent Cultures (last 7 days):   Results from last 7 days   Lab Units 04/25/25  1534 04/25/25  1532 04/24/25  0551 04/24/25  0547   SPUTUM CULTURE   --   --   --  3+ Growth of   GRAM STAIN RESULT  1+ Polys  No bacteria seen 1+ Polys  No bacteria seen  --  3+ Polys*  3+ Gram negative rods*  1+ Gram positive cocci in pairs*  1+ Epithelial Cells*   LEGIONELLA URINARY ANTIGEN   --   --  Negative  --        Imaging Results Review: No pertinent imaging studies reviewed.  Other Study Results Review: No additional pertinent studies reviewed.    Last 24 Hours Medication List:     Current Facility-Administered Medications:     acetaminophen (TYLENOL) tablet 650 mg, Q6H PRN    acyclovir (ZOVIRAX) tablet 400 mg, BID    albuterol (PROVENTIL HFA,VENTOLIN HFA) inhaler 2 puff, Q6H PRN    albuterol inhalation solution 2.5 mg, Q6H PRN    amLODIPine (NORVASC) tablet 10 mg, Daily    aspirin chewable tablet 81 mg, Daily    benzonatate (TESSALON PERLES) capsule 200 mg, TID PRN    ceFEPime (MAXIPIME) 2,000 mg in dextrose 5 % 50 mL IVPB, Q12H, Last Rate: 2,000 mg (04/26/25 1012)    citalopram (CeleXA) tablet 40 mg, Daily    dextromethorphan-guaiFENesin  (ROBITUSSIN DM) oral syrup 10 mL, Q4H PRN    ezetimibe (ZETIA) tablet 10 mg, HS    fluticasone-vilanterol 200-25 mcg/actuation 1 puff, Daily    folic acid (FOLVITE) tablet 800 mcg, Daily    gabapentin (NEURONTIN) capsule 600 mg, Daily    guaiFENesin (MUCINEX) 12 hr tablet 600 mg, BID    hydroCHLOROthiazide tablet 12.5 mg, Daily    Ibrutinib CAPS 140 mg, Daily    insulin glargine (LANTUS) subcutaneous injection 12 Units 0.12 mL, HS    insulin lispro (HumALOG/ADMELOG) 100 units/mL subcutaneous injection 1-6 Units, TID AC **AND** Fingerstick Glucose (POCT), TID AC    insulin lispro (HumALOG/ADMELOG) 100 units/mL subcutaneous injection 1-6 Units, HS    lidocaine (PF) (XYLOCAINE-MPF) 1 % injection **ADS Override Pull**,     LORazepam (ATIVAN) tablet 0.5 mg, Daily PRN    losartan (COZAAR) tablet 100 mg, Daily    melatonin tablet 3 mg, HS PRN    oxyCODONE (ROXICODONE) immediate release tablet 10 mg, Q6H PRN    pantoprazole (PROTONIX) EC tablet 40 mg, Early Morning    predniSONE tablet 5 mg, Daily    vancomycin (VANCOCIN) IVPB (premix in dextrose) 1,000 mg 200 mL, Q12H    Administrative Statements   Today, Patient Was Seen By: Sharon Edmondson MD  I have spent a total time of 39 minutes in caring for this patient on the day of the visit/encounter including Patient and family education, Importance of tx compliance, Counseling / Coordination of care, Documenting in the medical record, Reviewing/placing orders in the medical record (including tests, medications, and/or procedures), Obtaining or reviewing history  , and Communicating with other healthcare professionals .    **Please Note: This note may have been constructed using a voice recognition system.**

## 2025-04-26 NOTE — ASSESSMENT & PLAN NOTE
Known to Dr. Mejia, patient on ibrutinib and prednisone daily  Also receiving IVIG and Gazyva every 4 weeks  Last dose of IVIG 4/11/2025

## 2025-04-27 VITALS
TEMPERATURE: 97.5 F | OXYGEN SATURATION: 94 % | HEIGHT: 73 IN | RESPIRATION RATE: 20 BRPM | BODY MASS INDEX: 27.41 KG/M2 | SYSTOLIC BLOOD PRESSURE: 147 MMHG | HEART RATE: 74 BPM | WEIGHT: 206.79 LBS | DIASTOLIC BLOOD PRESSURE: 83 MMHG

## 2025-04-27 LAB
ANION GAP SERPL CALCULATED.3IONS-SCNC: 7 MMOL/L (ref 4–13)
ANISOCYTOSIS BLD QL SMEAR: PRESENT
BACTERIA BRONCH AEROBE CULT: NORMAL
BACTERIA BRONCH AEROBE CULT: NORMAL
BASOPHILS # BLD MANUAL: 0.1 THOUSAND/UL (ref 0–0.1)
BASOPHILS NFR MAR MANUAL: 2 % (ref 0–1)
BUN SERPL-MCNC: 14 MG/DL (ref 5–25)
CALCIUM SERPL-MCNC: 9.2 MG/DL (ref 8.4–10.2)
CHLORIDE SERPL-SCNC: 108 MMOL/L (ref 96–108)
CO2 SERPL-SCNC: 27 MMOL/L (ref 21–32)
CREAT SERPL-MCNC: 0.87 MG/DL (ref 0.6–1.3)
DOHLE BOD BLD QL SMEAR: PRESENT
EOSINOPHIL # BLD MANUAL: 0.4 THOUSAND/UL (ref 0–0.4)
EOSINOPHIL NFR BLD MANUAL: 8 % (ref 0–6)
ERYTHROCYTE [DISTWIDTH] IN BLOOD BY AUTOMATED COUNT: 13.5 % (ref 11.6–15.1)
GFR SERPL CREATININE-BSD FRML MDRD: 87 ML/MIN/1.73SQ M
GLUCOSE SERPL-MCNC: 119 MG/DL (ref 65–140)
GLUCOSE SERPL-MCNC: 134 MG/DL (ref 65–140)
GLUCOSE SERPL-MCNC: 162 MG/DL (ref 65–140)
GRAM STN SPEC: NORMAL
HCT VFR BLD AUTO: 36.2 % (ref 36.5–49.3)
HGB BLD-MCNC: 12 G/DL (ref 12–17)
LYMPHOCYTES # BLD AUTO: 1.14 THOUSAND/UL (ref 0.6–4.47)
LYMPHOCYTES # BLD AUTO: 20 % (ref 14–44)
MCH RBC QN AUTO: 29.6 PG (ref 26.8–34.3)
MCHC RBC AUTO-ENTMCNC: 33.1 G/DL (ref 31.4–37.4)
MCV RBC AUTO: 89 FL (ref 82–98)
MONOCYTES # BLD AUTO: 0.45 THOUSAND/UL (ref 0–1.22)
MONOCYTES NFR BLD: 9 % (ref 4–12)
NEUTROPHILS # BLD MANUAL: 2.87 THOUSAND/UL (ref 1.85–7.62)
NEUTS SEG NFR BLD AUTO: 58 % (ref 43–75)
OVALOCYTES BLD QL SMEAR: PRESENT
PLATELET # BLD AUTO: 170 THOUSANDS/UL (ref 149–390)
PLATELET BLD QL SMEAR: ADEQUATE
PMV BLD AUTO: 10.8 FL (ref 8.9–12.7)
POLYCHROMASIA BLD QL SMEAR: PRESENT
POTASSIUM SERPL-SCNC: 3.7 MMOL/L (ref 3.5–5.3)
RBC # BLD AUTO: 4.06 MILLION/UL (ref 3.88–5.62)
RBC MORPH BLD: PRESENT
SODIUM SERPL-SCNC: 142 MMOL/L (ref 135–147)
VARIANT LYMPHS # BLD AUTO: 3 %
WBC # BLD AUTO: 4.95 THOUSAND/UL (ref 4.31–10.16)

## 2025-04-27 PROCEDURE — 85007 BL SMEAR W/DIFF WBC COUNT: CPT | Performed by: INTERNAL MEDICINE

## 2025-04-27 PROCEDURE — 80048 BASIC METABOLIC PNL TOTAL CA: CPT | Performed by: INTERNAL MEDICINE

## 2025-04-27 PROCEDURE — 82948 REAGENT STRIP/BLOOD GLUCOSE: CPT

## 2025-04-27 PROCEDURE — 85027 COMPLETE CBC AUTOMATED: CPT | Performed by: INTERNAL MEDICINE

## 2025-04-27 PROCEDURE — 94761 N-INVAS EAR/PLS OXIMETRY MLT: CPT

## 2025-04-27 PROCEDURE — 99239 HOSP IP/OBS DSCHRG MGMT >30: CPT | Performed by: INTERNAL MEDICINE

## 2025-04-27 RX ORDER — GUAIFENESIN/DEXTROMETHORPHAN 100-10MG/5
5 SYRUP ORAL 3 TIMES DAILY PRN
Qty: 45 ML | Refills: 0 | Status: SHIPPED | OUTPATIENT
Start: 2025-04-27 | End: 2025-04-30

## 2025-04-27 RX ORDER — LEVOFLOXACIN 750 MG/1
750 TABLET, FILM COATED ORAL EVERY 24 HOURS
Qty: 2 TABLET | Refills: 0 | Status: SHIPPED | OUTPATIENT
Start: 2025-04-27 | End: 2025-04-29

## 2025-04-27 RX ADMIN — ACYCLOVIR 400 MG: 800 TABLET ORAL at 08:19

## 2025-04-27 RX ADMIN — ASPIRIN 81 MG: 81 TABLET, CHEWABLE ORAL at 08:21

## 2025-04-27 RX ADMIN — VANCOMYCIN HYDROCHLORIDE 1000 MG: 1 INJECTION, SOLUTION INTRAVENOUS at 08:53

## 2025-04-27 RX ADMIN — PREDNISONE 5 MG: 5 TABLET ORAL at 08:20

## 2025-04-27 RX ADMIN — FLUTICASONE FUROATE AND VILANTEROL TRIFENATATE 1 PUFF: 200; 25 POWDER RESPIRATORY (INHALATION) at 08:23

## 2025-04-27 RX ADMIN — CEFEPIME 2000 MG: 2 INJECTION, POWDER, FOR SOLUTION INTRAVENOUS at 10:04

## 2025-04-27 RX ADMIN — INSULIN LISPRO 1 UNITS: 100 INJECTION, SOLUTION INTRAVENOUS; SUBCUTANEOUS at 12:10

## 2025-04-27 RX ADMIN — IBRUTINIB 140 MG: 140 TABLET, FILM COATED ORAL at 08:19

## 2025-04-27 RX ADMIN — PANTOPRAZOLE SODIUM 40 MG: 40 TABLET, DELAYED RELEASE ORAL at 06:25

## 2025-04-27 RX ADMIN — FOLIC ACID TAB 400 MCG 800 MCG: 400 TAB at 08:21

## 2025-04-27 RX ADMIN — LOSARTAN POTASSIUM 100 MG: 50 TABLET, FILM COATED ORAL at 08:20

## 2025-04-27 RX ADMIN — GABAPENTIN 600 MG: 300 CAPSULE ORAL at 08:20

## 2025-04-27 RX ADMIN — CITALOPRAM HYDROBROMIDE 40 MG: 20 TABLET ORAL at 08:21

## 2025-04-27 RX ADMIN — AMLODIPINE BESYLATE 10 MG: 10 TABLET ORAL at 08:21

## 2025-04-27 RX ADMIN — GUAIFENESIN 600 MG: 600 TABLET ORAL at 08:20

## 2025-04-27 RX ADMIN — HYDROCHLOROTHIAZIDE 12.5 MG: 12.5 TABLET ORAL at 08:20

## 2025-04-27 NOTE — PLAN OF CARE
Problem: PAIN - ADULT  Goal: Verbalizes/displays adequate comfort level or baseline comfort level  Description: Interventions:- Encourage patient to monitor pain and request assistance- Assess pain using appropriate pain scale- Administer analgesics based on type and severity of pain and evaluate response- Implement non-pharmacological measures as appropriate and evaluate response- Consider cultural and social influences on pain and pain management- Notify physician/advanced practitioner if interventions unsuccessful or patient reports new pain  Outcome: Progressing     Problem: INFECTION - ADULT  Goal: Absence or prevention of progression during hospitalization  Description: INTERVENTIONS:- Assess and monitor for signs and symptoms of infection- Monitor lab/diagnostic results- Monitor all insertion sites, i.e. indwelling lines, tubes, and drains- Monitor endotracheal if appropriate and nasal secretions for changes in amount and color- [place] appropriate cooling/warming therapies per order- Administer medications as ordered- Instruct and encourage patient and family to use good hand hygiene technique- Identify and instruct in appropriate isolation precautions for identified infection/condition  Outcome: Progressing  Goal: Absence of fever/infection during neutropenic period  Description: INTERVENTIONS:- Monitor WBC  Outcome: Progressing     Problem: SAFETY ADULT  Goal: Patient will remain free of falls  Description: INTERVENTIONS:- Educate patient/family on patient safety including physical limitations- Instruct patient to call for assistance with activity - Consult OT/PT to assist with strengthening/mobility - Keep Call bell within reach- Keep bed low and locked with side rails adjusted as appropriate- Keep care items and personal belongings within reach- Initiate and maintain comfort rounds- Make Fall Risk Sign visible to staff- Offer Toileting every 2 Hours, in advance of need- Initiate/Maintain bed alarm-  Obtain necessary fall risk management equipment - Apply yellow socks and bracelet for high fall risk patients- Consider moving patient to room near nurses station  Outcome: Progressing     Problem: DISCHARGE PLANNING  Goal: Discharge to home or other facility with appropriate resources  Description: INTERVENTIONS:- Identify barriers to discharge w/patient and caregiver- Arrange for needed discharge resources and transportation as appropriate- Identify discharge learning needs (meds, wound care, etc.)- Arrange for interpretive services to assist at discharge as needed- Refer to Case Management Department for coordinating discharge planning if the patient needs post-hospital services based on physician/advanced practitioner order or complex needs related to functional status, cognitive ability, or social support system  Outcome: Progressing     Problem: Knowledge Deficit  Goal: Patient/family/caregiver demonstrates understanding of disease process, treatment plan, medications, and discharge instructions  Description: Complete learning assessment and assess knowledge base.Interventions:- Provide teaching at level of understanding- Provide teaching via preferred learning methods  Outcome: Progressing     Problem: RESPIRATORY - ADULT  Goal: Achieves optimal ventilation and oxygenation  Description: INTERVENTIONS:- Assess for changes in respiratory status- Assess for changes in mentation and behavior- Position to facilitate oxygenation and minimize respiratory effort- Oxygen administered by appropriate delivery if ordered- Initiate smoking cessation education as indicated- Encourage broncho-pulmonary hygiene including cough, deep breathe, Incentive Spirometry- Assess the need for suctioning and aspirate as needed- Assess and instruct to report SOB or any respiratory difficulty- Respiratory Therapy support as indicated  Outcome: Progressing     Problem: METABOLIC, FLUID AND ELECTROLYTES - ADULT  Goal: Electrolytes  maintained within normal limits  Description: INTERVENTIONS:- Monitor labs and assess patient for signs and symptoms of electrolyte imbalances- Administer electrolyte replacement as ordered- Monitor response to electrolyte replacements, including repeat lab results as appropriate- Instruct patient on fluid and nutrition as appropriate  Outcome: Progressing  Goal: Glucose maintained within target range  Description: INTERVENTIONS:- Monitor Blood Glucose as ordered- Assess for signs and symptoms of hyperglycemia and hypoglycemia- Administer ordered medications to maintain glucose within target range- Assess nutritional intake and initiate nutrition service referral as needed  Outcome: Progressing     Problem: Nutrition/Hydration-ADULT  Goal: Nutrient/Hydration intake appropriate for improving, restoring or maintaining nutritional needs  Description: Monitor and assess patient's nutrition/hydration status for malnutrition. Collaborate with interdisciplinary team and initiate plan and interventions as ordered.  Monitor patient's weight and dietary intake as ordered or per policy. Utilize nutrition screening tool and intervene as necessary. Determine patient's food preferences and provide high-protein, high-caloric foods as appropriate. INTERVENTIONS:- Monitor oral intake, urinary output, labs, and treatment plans- Assess nutrition and hydration status and recommend course of action- Evaluate amount of meals eaten- Assist patient with eating if necessary - Allow adequate time for meals- Recommend/ encourage appropriate diets, oral nutritional supplements, and vitamin/mineral supplements- Order, calculate, and assess calorie counts as needed- Recommend, monitor, and adjust tube feedings and TPN/PPN based on assessed needs- Assess need for intravenous fluids- Provide specific nutrition/hydration education as appropriate- Include patient/family/caregiver in decisions related to nutrition  Outcome: Progressing     Problem:  Prexisting or High Potential for Compromised Skin Integrity  Goal: Skin integrity is maintained or improved  Description: INTERVENTIONS:- Identify patients at risk for skin breakdown- Assess and monitor skin integrity- Assess and monitor nutrition and hydration status- Monitor labs - Assess for incontinence - Turn and reposition patient- Assist with mobility/ambulation- Relieve pressure over bony prominences- Avoid friction and shearing- Provide appropriate hygiene as needed including keeping skin clean and dry- Evaluate need for skin moisturizer/barrier cream- Collaborate with interdisciplinary team - Patient/family teaching- Consider wound care consult   Outcome: Progressing

## 2025-04-27 NOTE — CASE MANAGEMENT
Case Management Discharge Planning Note    Patient name Cayetano Lucero  Location [facility] /E5 -* MRN [id number]  : 1955 Date 2025       Current Admission Date: 2025  Current Admission Diagnosis:Multifocal pneumonia   Patient Active Problem List    Diagnosis Date Noted Date Diagnosed    Dyspnea on exertion 2025     Pulmonary nodule 04/10/2025     Sepsis without acute organ dysfunction (HCC) 2025     Left lower lobe pulmonary infiltrate 2025     S/P repair of paraesophageal hernia 2024     Low serum IgG for age 04/10/2024     Drug-induced osteoporosis 04/10/2024     Port-A-Cath in place 2024     Type 2 diabetes mellitus with diabetic polyneuropathy, with long-term current use of insulin ([facility]) 2024     Preop cardiovascular exam 2023     Cancer related pain 2023     Long term (current) use of systemic steroids 2022     Multifocal pneumonia 2022     Depression, recurrent ([facility]) 03/15/2021     Chronic obstructive pulmonary disease ([facility]) 2020     Autoimmune hemolytic anemia ([facility]) 2019     Right upper quadrant abdominal pain 2019     Hypogammaglobulinemia, acquired ([facility]) 04/10/2019     Acute bursitis of right shoulder 2018     Esophageal dysphagia 10/11/2018     Esophageal stricture 2018     Dysphagia 2018     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/2018     Chronic right shoulder pain 2018     Type 2 diabetes mellitus, with long-term current use of insulin ([facility]) 2018     Listeria bacteremia 2018     Colitis, acute 2018     Esophageal reflux 2018     Headache around the eyes 10/30/2017     Pancytopenia ([facility]) 10/28/2017     Cellulitis of head or scalp 10/08/2017     Leukopenia due to antineoplastic chemotherapy ([facility]) 2017     Hyperglycemia 2017     Ulcer of esophagus without bleeding 2017     Chronic kidney disease, stage II (mild) 2017     Anemia,  chronic disease 03/10/2017     Hemolytic anemia (HCC) 03/06/2017     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) 03/06/2017     H/O blood clots      Candida esophagitis (HCC) 01/18/2017     CLL (chronic lymphocytic leukemia) (HCC)      Hyperlipidemia      Hypertension      History of TIA (transient ischemic attack)      Esophagitis, reflux 04/16/2014     Thrombocytopenia (HCC) 05/01/2013     Chronic lymphocytic leukemia (HCC) 04/18/2013     CAD (coronary artery disease) 01/01/2004       LOS (days): 4  Geometric Mean LOS (GMLOS) (days): 3.2  Days to GMLOS:-0.5     OBJECTIVE:  Risk of Unplanned Readmission Score: 21.72         Current admission status: Inpatient   Preferred Pharmacy:   [id number] - [place] - [address]  [address] [place] [address]  Phone: [phone] Fax: [phone]    [facility] - [address] [address]  Phone: [phone] Fax: [phone]    [facility] - [place], [place] - [address]  [address]  [place] [address]  Phone: [phone] Fax: [phone]    [facility] - [place], [place] - [id number]  [address]  [id number]  [address]  [place] [id number]  Phone: [phone] Fax: [phone]    [facility] - [place] [facility] [address] [facility] [address]  [place] [address]  Phone: [phone] Fax: [phone]    PATIENT/FAMILY REPORTS NO PREFERRED PHARMACY  No address on file      Primary Care Provider: Meseret Elliott DO    Primary Insurance: MEDICARE  Secondary Insurance: BLUE CROSS    DISCHARGE DETAILS:    Discharge planning discussed with:: Patient  Freedom of Choice: Yes     CM contacted family/caregiver?: No- see comments  Were Treatment Team discharge recommendations reviewed with patient/caregiver?: Yes  Did patient/caregiver verbalize understanding of patient care needs?: Yes  Were patient/caregiver advised of the risks  associated with not following Treatment Team discharge recommendations?: Yes    DME Referral Provided  Referral made for DME?: Yes  DME referral completed for the following items:: Home Oxygen concentrator, Portable Oxygen tanks  DME Supplier Name:: [employer]    Other Referral/Resources/Interventions Provided:  Interventions: DME      Treatment Team Recommendation: Home  Discharge Destination Plan:: Home  Transport at Discharge : Family       Additional Comments: Patient qualified for O2 with excertion.  Met with patient at bedside to confirm a physical address for O2 delivery.  Explained need for home O2.  In basket message to PCP for BRODIE visit due to readmission.  O2 approved and POC provided to patient from consignment.  Spouse will transport home

## 2025-04-27 NOTE — PLAN OF CARE
Problem: PAIN - ADULT  Goal: Verbalizes/displays adequate comfort level or baseline comfort level  Description: Interventions:- Encourage patient to monitor pain and request assistance- Assess pain using appropriate pain scale- Administer analgesics based on type and severity of pain and evaluate response- Implement non-pharmacological measures as appropriate and evaluate response- Consider cultural and social influences on pain and pain management- Notify physician/advanced practitioner if interventions unsuccessful or patient reports new pain  Outcome: Progressing     Problem: INFECTION - ADULT  Goal: Absence or prevention of progression during hospitalization  Description: INTERVENTIONS:- Assess and monitor for signs and symptoms of infection- Monitor lab/diagnostic results- Monitor all insertion sites, i.e. indwelling lines, tubes, and drains- Monitor endotracheal if appropriate and nasal secretions for changes in amount and color- Raymond appropriate cooling/warming therapies per order- Administer medications as ordered- Instruct and encourage patient and family to use good hand hygiene technique- Identify and instruct in appropriate isolation precautions for identified infection/condition  Outcome: Progressing  Goal: Absence of fever/infection during neutropenic period  Description: INTERVENTIONS:- Monitor WBC  Outcome: Progressing     Problem: SAFETY ADULT  Goal: Patient will remain free of falls  Description: INTERVENTIONS:- Educate patient/family on patient safety including physical limitations- Instruct patient to call for assistance with activity - Consult OT/PT to assist with strengthening/mobility - Keep Call bell within reach- Keep bed low and locked with side rails adjusted as appropriate- Keep care items and personal belongings within reach- Initiate and maintain comfort rounds- Make Fall Risk Sign visible to staff- Offer Toileting every 2 Hours, in advance of need- Initiate/Maintain bed alarm-  Obtain necessary fall risk management equipment - Apply yellow socks and bracelet for high fall risk patients- Consider moving patient to room near nurses station  Outcome: Progressing     Problem: DISCHARGE PLANNING  Goal: Discharge to home or other facility with appropriate resources  Description: INTERVENTIONS:- Identify barriers to discharge w/patient and caregiver- Arrange for needed discharge resources and transportation as appropriate- Identify discharge learning needs (meds, wound care, etc.)- Arrange for interpretive services to assist at discharge as needed- Refer to Case Management Department for coordinating discharge planning if the patient needs post-hospital services based on physician/advanced practitioner order or complex needs related to functional status, cognitive ability, or social support system  Outcome: Progressing     Problem: Knowledge Deficit  Goal: Patient/family/caregiver demonstrates understanding of disease process, treatment plan, medications, and discharge instructions  Description: Complete learning assessment and assess knowledge base.Interventions:- Provide teaching at level of understanding- Provide teaching via preferred learning methods  Outcome: Progressing     Problem: RESPIRATORY - ADULT  Goal: Achieves optimal ventilation and oxygenation  Description: INTERVENTIONS:- Assess for changes in respiratory status- Assess for changes in mentation and behavior- Position to facilitate oxygenation and minimize respiratory effort- Oxygen administered by appropriate delivery if ordered- Initiate smoking cessation education as indicated- Encourage broncho-pulmonary hygiene including cough, deep breathe, Incentive Spirometry- Assess the need for suctioning and aspirate as needed- Assess and instruct to report SOB or any respiratory difficulty- Respiratory Therapy support as indicated  Outcome: Progressing     Problem: METABOLIC, FLUID AND ELECTROLYTES - ADULT  Goal: Electrolytes  maintained within normal limits  Description: INTERVENTIONS:- Monitor labs and assess patient for signs and symptoms of electrolyte imbalances- Administer electrolyte replacement as ordered- Monitor response to electrolyte replacements, including repeat lab results as appropriate- Instruct patient on fluid and nutrition as appropriate  Outcome: Progressing  Goal: Glucose maintained within target range  Description: INTERVENTIONS:- Monitor Blood Glucose as ordered- Assess for signs and symptoms of hyperglycemia and hypoglycemia- Administer ordered medications to maintain glucose within target range- Assess nutritional intake and initiate nutrition service referral as needed  Outcome: Progressing     Problem: Nutrition/Hydration-ADULT  Goal: Nutrient/Hydration intake appropriate for improving, restoring or maintaining nutritional needs  Description: Monitor and assess patient's nutrition/hydration status for malnutrition. Collaborate with interdisciplinary team and initiate plan and interventions as ordered.  Monitor patient's weight and dietary intake as ordered or per policy. Utilize nutrition screening tool and intervene as necessary. Determine patient's food preferences and provide high-protein, high-caloric foods as appropriate. INTERVENTIONS:- Monitor oral intake, urinary output, labs, and treatment plans- Assess nutrition and hydration status and recommend course of action- Evaluate amount of meals eaten- Assist patient with eating if necessary - Allow adequate time for meals- Recommend/ encourage appropriate diets, oral nutritional supplements, and vitamin/mineral supplements- Order, calculate, and assess calorie counts as needed- Recommend, monitor, and adjust tube feedings and TPN/PPN based on assessed needs- Assess need for intravenous fluids- Provide specific nutrition/hydration education as appropriate- Include patient/family/caregiver in decisions related to nutrition  Outcome: Progressing     Problem:  Prexisting or High Potential for Compromised Skin Integrity  Goal: Skin integrity is maintained or improved  Description: INTERVENTIONS:- Identify patients at risk for skin breakdown- Assess and monitor skin integrity- Assess and monitor nutrition and hydration status- Monitor labs - Assess for incontinence - Turn and reposition patient- Assist with mobility/ambulation- Relieve pressure over bony prominences- Avoid friction and shearing- Provide appropriate hygiene as needed including keeping skin clean and dry- Evaluate need for skin moisturizer/barrier cream- Collaborate with interdisciplinary team - Patient/family teaching- Consider wound care consult   Outcome: Progressing

## 2025-04-27 NOTE — RESTORATIVE TECHNICIAN NOTE
Restorative Technician Note      Patient Name: Cayetano Lucero     Restorative Tech Visit Date: 04/27/25  Note Type: Mobility  Patient Position Upon Consult: Bedside chair  Activity Performed: Ambulated  Patient Position at End of Consult: All needs within reach; Supine

## 2025-04-27 NOTE — CONSULTS
Vancomycin IV Pharmacy-to-Dose Consultation     Vancomycin has been discontinued.  Pharmacy will sign off.  Please contact or re-consult with questions.    Yu Bowling, Pharmacist

## 2025-04-27 NOTE — NURSING NOTE
AVS reviewed with pt, all questions and concerns addressed.  IV removed, pt's own medication (Ibrutinib) returned to pt.  O2 setup delivered at bedside and taken by pt.  IV removed.

## 2025-04-27 NOTE — RESPIRATORY THERAPY NOTE
Home Oxygen Qualifying Test     Patient name: Cayetano Lucero        : 1955   Date of Test:  2025  Diagnosis:    Home Oxygen Test:    **Medicare Guidelines require item(s) 1-5 on all ambulatory patients or 1 and 2 on non-ambulatory patients.    1. Baseline SPO2 on Room Air at rest 94 %   If <= 88% on Room Air add O2 via NC to obtain SpO2 >=88%. If LPM needed, document LPM N/A needed to reach =>88%    SPO2 during exertion on Room Air 87  %  During exertion monitor SPO2. If SPO2 increases >=89%, do not add supplemental oxygen    SPO2 on Oxygen at Rest 97 % at 2 LPM    SPO2 during exertion on Oxygen 93 % at 2 LPM    Test performed during exertion activity.      [x]  Supplemental Home Oxygen is indicated.    []  Client does not qualify for home oxygen.    Respiratory Additional Notes-Walk unassisted  ra sat = 87%, continue walk x 6 minutes on 2 lpm lowest sat = 93%    Hira Dominique, RT

## 2025-04-27 NOTE — DISCHARGE SUMMARY
Discharge Summary - Hospitalist   Name: Cayetano Lucero 69 y.o. male I MRN: [id number]  Unit/Bed#: E5 -01 I Date of Admission: 4/23/2025   Date of Service: 4/27/2025 I Hospital Day: 4     Assessment & Plan       Medical Problems       Resolved Problems  Date Reviewed: 4/26/2025   None       Discharging Physician / Practitioner: Rupesh Diaz MD  PCP: Meseret Elliott DO  Admission Date:   Admission Orders (From admission, onward)       Ordered        04/23/25 2204  INPATIENT ADMISSION  Once                          Discharge Date: 04/27/25    Consultations During Hospital Stay:  Pulmonology    Procedures Performed:   bronchoscopy    Significant Findings / Test Results:   Bronchoscopy  Result Date: 4/25/2025  Impression: Bronchoalveolar lavage was performed The trachea, main jero, left main stem, CALEB, lingula, right main stem, bronchus intermedius, RML and RLL appeared normal. Moderate, thick and white secretions present in the main jero, left lung and right lung; performed washing RECOMMENDATION: - When patient appropriately recovers from anesthesia, he may return to hospital room and may resume previously ordered diet - Follow-up microbiology and cytology - Rest of care per pulmonary consult note dated 4/25/2025      CT chest with contrast  Result Date: 4/23/2025  Impression: 1) Irregular lower lobe opacities in the central, partially groundglass opacity in the right upper lobe, increased from 4/9/2025. These findings likely represent worsening multifocal pneumonia. Other considerations include pneumonitis. 2) Some reticular opacities in the lower lobes and in the right upper lobe, as well as mild diffuse subpleural reticulation was also present on the 2022 CT, and may indicate a component of underlying interstitial lung disease. No honeycombing. Pulmonology consultation may be of value. 3) Bronchial wall thickening with some mucous plugging in the lower lobes, likely also related to the aforementioned  process. 4) 7 mm left upper lobe groundglass nodule with no associated solid component, increased from 4 mm in 2022. This may represent an adenocarcinoma spectrum lesion. Follow-up noncontrast chest CT is recommended in 12 months. 5) Circumferential wall thickening of the distal esophagus, which may be related to esophagitis or gastroesophageal reflux. 6) Additional findings as above.       Incidental Findings:   7 mm CALEB GGO- increased from 4 mm    Test Results Pending at Discharge (will require follow up):   Bronch culture results      Complications:  none    Reason for Admission:   Chief Complaint   Patient presents with    Flu Symptoms     Pt was dx with multi focal pneumonia. Pt states was feeling better but is now feeling short of breath, chills, fever of 104 at home, body aches and spitting up green mucous. Tylenol at home around 11 pta        Hospital Course:   Cayetano Lucero is a 69 y.o. male patient who originally presented to the hospital on 4/23/2025 due to fever, myalgias, SOB, and chills.  Patient admitted for multifocal pneumonia.  With history of CLL with hypogammaglobulinemia and chronic immunocompromise, pulmonology was consulted.  Patient had bronchoscopy.  Patient was started oncefepime and vancomycin. With MRSA negative, Vancomycin discontinued. Discussed with pulm today, okay to DC. Patient still has some bronch results pending. Had clinical improvement with Abx, will Continue Abx on DC.  Ambulatory pulse ox done at the time of discharge, patient desaturated to 87 to 88% with exertion, home oxygen evaluation done.  Patient will be discharged with home O2.    Patient has been taking acyclovir since 2017.  Had recent EGD in 2020 for which showed Candida esophagitis.  Did not have a follow-up with GI/heme-onc regarding acyclovir.  Recommended to follow-up with heme-onc who is managing acyclovir    Patient will need follow-up with PCP, pulmonology postdischarge    Please see above list of  "diagnoses and related plan for additional information.     Condition at Discharge: stable    Discharge Day Visit / Exam:   Subjective: No complaints  Vitals: Blood Pressure: 147/83 (04/27/25 0701)  Pulse: 74 (04/27/25 0701)  Temperature: 97.5 °F (36.4 °C) (04/27/25 0701)  Temp Source: Oral (04/27/25 0701)  Respirations: 20 (04/27/25 0701)  Height: 6' 1\" (185.4 cm) (04/23/25 2303)  Weight - Scale: 93.8 kg (206 lb 12.7 oz) (04/23/25 2303)  SpO2: 94 % (04/27/25 0701)  Physical Exam   Gen.-Patient comfortable   Neck- Supple. No thyromegaly or lymphadenopathy  Lungs-Clear bilaterally without any wheeze or rales   Heart S1-S2, regular rate and rhythm, no murmurs  Abdomen-soft nontender, no organomegaly. Bowel sounds present  Extremities-no cyanosi,  clubbing or edema  Skin- no rash  Neuro-nonfocal     Discussion with Family: Updated  (wife) via phone.    Discharge instructions/Information to patient and family:   See after visit summary for information provided to patient and family.      Provisions for Follow-Up Care:  See after visit summary for information related to follow-up care and any pertinent home health orders.      Mobility at time of Discharge:   Basic Mobility Inpatient Raw Score: 23  JH-HLM Goal: 7: Walk 25 feet or more  JH-HLM Achieved: 7: Walk 25 feet or more  HLM Goal achieved. Continue to encourage appropriate mobility.     Disposition:   Home    Planned Readmission: none    Discharge Medications:  See after visit summary for reconciled discharge medications provided to patient and/or family.      Administrative Statements   Discharge Statement:  I have spent a total time of 33 minutes in caring for this patient on the day of the visit/encounter. >30 minutes of time was spent on: Diagnostic results, Risk factor reductions, Impressions, Counseling / Coordination of care, Documenting in the medical record, Reviewing / ordering tests, medicine, procedures  , and Communicating with other " healthcare professionals .    **Please Note: This note may have been constructed using a voice recognition system**

## 2025-04-28 ENCOUNTER — TELEPHONE (OUTPATIENT)
Dept: HEMATOLOGY ONCOLOGY | Facility: CLINIC | Age: 70
End: 2025-04-28

## 2025-04-28 ENCOUNTER — TRANSITIONAL CARE MANAGEMENT (OUTPATIENT)
Dept: FAMILY MEDICINE CLINIC | Facility: CLINIC | Age: 70
End: 2025-04-28

## 2025-04-28 DIAGNOSIS — R76.8 LOW SERUM IGG FOR AGE: ICD-10-CM

## 2025-04-28 DIAGNOSIS — D80.1 HYPOGAMMAGLOBULINEMIA, ACQUIRED (HCC): ICD-10-CM

## 2025-04-28 DIAGNOSIS — C91.10 CLL (CHRONIC LYMPHOCYTIC LEUKEMIA) (HCC): Primary | ICD-10-CM

## 2025-04-28 LAB

## 2025-04-28 RX ORDER — ACETAMINOPHEN 325 MG/1
650 TABLET ORAL ONCE
OUTPATIENT
Start: 2025-05-07

## 2025-04-28 RX ORDER — SODIUM CHLORIDE 9 MG/ML
20 INJECTION, SOLUTION INTRAVENOUS ONCE
OUTPATIENT
Start: 2025-05-07

## 2025-04-28 NOTE — TELEPHONE ENCOUNTER
Spoke with patient and his wife.  Reviewed that we will increase IVIG dose from 200 mg/kg to 400 mg/kg due to his 2 recent episodes of pneumonia.    He is due for IVIG tomorrow.  Will defer this x 1 week secondary to him recovering from infection and hospitalization.  Please reschedule this appointment.    No other changes in his treatment plan.  Follow-up as scheduled

## 2025-04-29 ENCOUNTER — TELEPHONE (OUTPATIENT)
Age: 70
End: 2025-04-29

## 2025-04-29 ENCOUNTER — HOSPITAL ENCOUNTER (OUTPATIENT)
Dept: INFUSION CENTER | Facility: CLINIC | Age: 70
End: 2025-04-29

## 2025-04-29 NOTE — TELEPHONE ENCOUNTER
Please let patient know that so far all results from bronchoscopy are negative including malignancy we are still waiting for the pneumocystis please let him know that DR. Crisostomo is in procedures throughout the day and he will contact them at the end of the day

## 2025-04-30 LAB
MYCOBACTERIUM SPEC CULT: NORMAL
MYCOBACTERIUM SPEC CULT: NORMAL
RHODAMINE-AURAMINE STN SPEC: NORMAL
RHODAMINE-AURAMINE STN SPEC: NORMAL

## 2025-05-01 LAB
FUNGUS SPEC CULT: ABNORMAL
FUNGUS SPEC CULT: ABNORMAL

## 2025-05-02 LAB — PNEUMOCYSTIS PCR: NEGATIVE

## 2025-05-05 ENCOUNTER — OFFICE VISIT (OUTPATIENT)
Dept: FAMILY MEDICINE CLINIC | Facility: CLINIC | Age: 70
End: 2025-05-05
Payer: MEDICARE

## 2025-05-05 ENCOUNTER — TELEPHONE (OUTPATIENT)
Age: 70
End: 2025-05-05

## 2025-05-05 VITALS
DIASTOLIC BLOOD PRESSURE: 78 MMHG | HEART RATE: 86 BPM | BODY MASS INDEX: 28.23 KG/M2 | HEIGHT: 73 IN | SYSTOLIC BLOOD PRESSURE: 120 MMHG | WEIGHT: 213 LBS | TEMPERATURE: 98.7 F | OXYGEN SATURATION: 92 %

## 2025-05-05 DIAGNOSIS — C91.10 CHRONIC LYMPHOCYTIC LEUKEMIA (HCC): ICD-10-CM

## 2025-05-05 DIAGNOSIS — J18.9 MULTIFOCAL PNEUMONIA: Primary | ICD-10-CM

## 2025-05-05 DIAGNOSIS — J44.9 CHRONIC OBSTRUCTIVE PULMONARY DISEASE, UNSPECIFIED COPD TYPE (HCC): ICD-10-CM

## 2025-05-05 PROCEDURE — 99214 OFFICE O/P EST MOD 30 MIN: CPT | Performed by: FAMILY MEDICINE

## 2025-05-05 NOTE — PROGRESS NOTES
Transition of Care Visit:  Name: Cayetano Lucero      : 1955      MRN: [id number]  Encounter Provider: Meseret Elliott DO  Encounter Date: 2025   Encounter department: [facility]    Assessment & Plan  Multifocal pneumonia  Clinically stable.  Patient is scheduled to see pulmonology.  He has completed his antibiotics.  He is monitoring his oxygen saturations periodically at home.  Continue with his current treatment.  Continues with his Dulera as well as albuterol.       Chronic obstructive pulmonary disease, unspecified COPD type (HCC)         Chronic lymphocytic leukemia (HCC)              History of Present Illness     Transitional Care Management Review:   Cayetano Lucero is a 69 y.o. male here for TCM follow up.  He was admitted on  subsidy discharged on .  He was admitted secondary to multifocal pneumonia.  He did have an extensive valuation.  He was started on empiric treatment with antibiotics.  He did have a bronchoscopy his symptoms did slowly improve.  After few days.  Since his discharge, he states that has been doing very well.    During the TCM phone call patient stated:  TCM Call (since 2025)       Date and time call was made  2025  9:54 AM    Hospital care reviewed  Records reviewed    Date of Admission  25    Date of discharge  25    Diagnosis  Pneumonia    Disposition  Home    Were the patients medications reviewed and updated  No    Current Symptoms  None          TCM Call (since 2025)       Post hospital issues  Reduced activity    Scheduled for follow up?  Yes    Did you obtain your prescribed medications  Yes    Do you need help managing your prescriptions or medications  No    Is transportation to your appointment needed  No    I have advised the patient to call PCP with any new or worsening symptoms  Carlee Morales MA    Living Arrangements  Spouse or Significiant other          HPI  Review of Systems   Constitutional:   "Negative for activity change, chills, fatigue and fever.   HENT:  Negative for congestion, ear pain, sinus pressure and sore throat.    Eyes:  Negative for redness, itching and visual disturbance.   Respiratory:  Negative for cough and shortness of breath.    Cardiovascular:  Negative for chest pain and palpitations.   Gastrointestinal:  Negative for abdominal pain, diarrhea and nausea.   Endocrine: Negative for cold intolerance and heat intolerance.   Genitourinary:  Negative for dysuria, flank pain and frequency.   Musculoskeletal:  Negative for arthralgias, back pain, gait problem and myalgias.   Skin:  Negative for color change.   Allergic/Immunologic: Negative for environmental allergies.   Neurological:  Negative for dizziness, numbness and headaches.   Psychiatric/Behavioral:  Negative for behavioral problems and sleep disturbance.      Objective   /78 (BP Location: Right arm, Patient Position: Sitting, Cuff Size: Large)   Pulse 86   Temp 98.7 °F (37.1 °C)   Ht 6' 1\" (1.854 m)   Wt 96.6 kg (213 lb)   SpO2 92%   BMI 28.10 kg/m²     Physical Exam  Vitals reviewed.   Constitutional:       General: He is not in acute distress.     Appearance: Normal appearance. He is well-developed.   HENT:      Head: Normocephalic and atraumatic.      Right Ear: Tympanic membrane, ear canal and external ear normal. There is no impacted cerumen.      Left Ear: Tympanic membrane, ear canal and external ear normal. There is no impacted cerumen.      Nose: Nose normal. No congestion or rhinorrhea.      Mouth/Throat:      Mouth: Mucous membranes are moist.      Pharynx: No oropharyngeal exudate or posterior oropharyngeal erythema.   Eyes:      General: No scleral icterus.        Right eye: No discharge.         Left eye: No discharge.      Extraocular Movements: Extraocular movements intact.      Conjunctiva/sclera: Conjunctivae normal.      Pupils: Pupils are equal, round, and reactive to light.   Neck:      Trachea: No " tracheal deviation.   Cardiovascular:      Rate and Rhythm: Normal rate and regular rhythm.      Pulses: Normal pulses.           Dorsalis pedis pulses are 2+ on the right side and 2+ on the left side.        Posterior tibial pulses are 2+ on the right side and 2+ on the left side.      Heart sounds: Normal heart sounds. No murmur heard.     No friction rub. No gallop.   Pulmonary:      Effort: Pulmonary effort is normal. No respiratory distress.      Breath sounds: Normal breath sounds. No wheezing, rhonchi or rales.   Abdominal:      General: Bowel sounds are normal. There is no distension.      Palpations: Abdomen is soft.      Tenderness: There is no abdominal tenderness. There is no guarding or rebound.   Musculoskeletal:         General: Normal range of motion.      Cervical back: Normal range of motion and neck supple.      Right lower leg: No edema.      Left lower leg: No edema.   Lymphadenopathy:      Head:      Right side of head: No submental or submandibular adenopathy.      Left side of head: No submental or submandibular adenopathy.      Cervical: No cervical adenopathy.      Right cervical: No superficial, deep or posterior cervical adenopathy.     Left cervical: No superficial, deep or posterior cervical adenopathy.   Skin:     General: Skin is warm and dry.      Findings: No erythema.   Neurological:      General: No focal deficit present.      Mental Status: He is alert and oriented to person, place, and time.      Cranial Nerves: No cranial nerve deficit.      Sensory: Sensation is intact. No sensory deficit.      Motor: Motor function is intact.   Psychiatric:         Attention and Perception: Attention and perception normal.         Mood and Affect: Mood is not anxious or depressed.         Speech: Speech normal.         Behavior: Behavior normal.         Thought Content: Thought content normal.         Judgment: Judgment normal.     Medications have been reviewed by provider in current  encounter

## 2025-05-05 NOTE — ASSESSMENT & PLAN NOTE
Clinically stable.  Patient is scheduled to see pulmonology.  He has completed his antibiotics.  He is monitoring his oxygen saturations periodically at home.  Continue with his current treatment.  Continues with his Dulera as well as albuterol.

## 2025-05-05 NOTE — TELEPHONE ENCOUNTER
Patient called stating he needs a refill on is merary 2 sensors. He is unable to locate the company that delivers them or the phone number. He is asking if we can look into this and give him a call back with this information. Thank you.

## 2025-05-06 DIAGNOSIS — Z79.4 TYPE 2 DIABETES MELLITUS WITHOUT COMPLICATION, WITH LONG-TERM CURRENT USE OF INSULIN (HCC): ICD-10-CM

## 2025-05-06 DIAGNOSIS — E11.9 TYPE 2 DIABETES MELLITUS WITHOUT COMPLICATION, WITH LONG-TERM CURRENT USE OF INSULIN (HCC): ICD-10-CM

## 2025-05-06 LAB
DME PARACHUTE DELIVERY DATE REQUESTED: NORMAL
DME PARACHUTE ITEM DESCRIPTION: NORMAL
DME PARACHUTE ITEM DESCRIPTION: NORMAL
DME PARACHUTE ORDER STATUS: NORMAL
DME PARACHUTE SUPPLIER NAME: NORMAL
DME PARACHUTE SUPPLIER PHONE: NORMAL
MYCOBACTERIUM SPEC CULT: NORMAL
MYCOBACTERIUM SPEC CULT: NORMAL
RHODAMINE-AURAMINE STN SPEC: NORMAL
RHODAMINE-AURAMINE STN SPEC: NORMAL

## 2025-05-06 NOTE — TELEPHONE ENCOUNTER
Reason for call:   [x] Refill   [] Prior Auth  [] Other:     Office:   [] PCP/Provider -   [x] Specialty/Provider - Halie No     Medication: Continuous Glucose Sensor (FreeStyle Ashley 2 Sensor) MISC     Dose/Frequency: Apply 1 sensor every 14 days. Use as directed with Freestyle Ashley 2 reader     Quantity: 6    Pharmacy: [facility]   Does the patient have enough for 3 days?   [x] Yes   [] No - Send as HP to POD

## 2025-05-06 NOTE — TELEPHONE ENCOUNTER
Patient called to request a refill for their Freestyle Ashley advised a refill was requested on 5/6/25 and is pending approval. Patient verbalized understanding and is in agreement.     Does the patient have enough for 3 days?   [x] Yes   [] No - Send as HP to POD

## 2025-05-07 ENCOUNTER — HOSPITAL ENCOUNTER (OUTPATIENT)
Dept: INFUSION CENTER | Facility: CLINIC | Age: 70
Discharge: HOME | End: 2025-05-07
Attending: INTERNAL MEDICINE
Payer: MEDICARE

## 2025-05-07 VITALS
TEMPERATURE: 97.5 F | HEART RATE: 67 BPM | SYSTOLIC BLOOD PRESSURE: 160 MMHG | WEIGHT: 211.42 LBS | DIASTOLIC BLOOD PRESSURE: 83 MMHG | RESPIRATION RATE: 18 BRPM | BODY MASS INDEX: 27.89 KG/M2

## 2025-05-07 DIAGNOSIS — C91.10 CLL (CHRONIC LYMPHOCYTIC LEUKEMIA) (HCC): Primary | ICD-10-CM

## 2025-05-07 DIAGNOSIS — R76.8 LOW SERUM IGG FOR AGE: Primary | ICD-10-CM

## 2025-05-07 DIAGNOSIS — C91.10 CLL (CHRONIC LYMPHOCYTIC LEUKEMIA) (HCC): ICD-10-CM

## 2025-05-07 DIAGNOSIS — D80.1 HYPOGAMMAGLOBULINEMIA, ACQUIRED (HCC): ICD-10-CM

## 2025-05-07 PROCEDURE — 96365 THER/PROPH/DIAG IV INF INIT: CPT

## 2025-05-07 PROCEDURE — 96366 THER/PROPH/DIAG IV INF ADDON: CPT

## 2025-05-07 PROCEDURE — 96367 TX/PROPH/DG ADDL SEQ IV INF: CPT

## 2025-05-07 RX ORDER — ACETAMINOPHEN 325 MG/1
650 TABLET ORAL ONCE
Status: COMPLETED | OUTPATIENT
Start: 2025-05-07 | End: 2025-05-07

## 2025-05-07 RX ORDER — ACETAMINOPHEN 325 MG/1
650 TABLET ORAL ONCE
OUTPATIENT
Start: 2025-06-03

## 2025-05-07 RX ORDER — SODIUM CHLORIDE 9 MG/ML
20 INJECTION, SOLUTION INTRAVENOUS ONCE
Status: COMPLETED | OUTPATIENT
Start: 2025-05-07 | End: 2025-05-07

## 2025-05-07 RX ORDER — SODIUM CHLORIDE 9 MG/ML
20 INJECTION, SOLUTION INTRAVENOUS ONCE
OUTPATIENT
Start: 2025-06-03

## 2025-05-07 RX ADMIN — Medication 37.5 G: at 09:35

## 2025-05-07 RX ADMIN — DIPHENHYDRAMINE HYDROCHLORIDE 12.5 MG: 50 INJECTION, SOLUTION INTRAMUSCULAR; INTRAVENOUS at 08:41

## 2025-05-07 RX ADMIN — ACETAMINOPHEN 650 MG: 325 TABLET ORAL at 08:41

## 2025-05-07 RX ADMIN — SODIUM CHLORIDE 20 ML/HR: 0.9 INJECTION, SOLUTION INTRAVENOUS at 08:42

## 2025-05-07 RX ADMIN — DEXAMETHASONE SODIUM PHOSPHATE 4 MG: 10 INJECTION, SOLUTION INTRAMUSCULAR; INTRAVENOUS at 09:05

## 2025-05-07 NOTE — PROGRESS NOTES
Patient presents for IVIG. Patient states that his schedule is not right and his treatments should be every other week but are currently scheduled back to back weeks. Per Dr. Mejia, patient is correct and he should receive IVIG, skip a week, then receive Gazyva. Scheduling fixed at this time to accommodate this. Patient tolerated infusion without incident. AVS printed and reviewed with patient. Aware of next appointment 5/22 @ 8am.

## 2025-05-07 NOTE — PLAN OF CARE
Problem: Potential for Falls  Goal: Patient will remain free of falls  Description: INTERVENTIONS:- Educate patient/family on patient safety including physical limitations- Instruct patient to call for assistance with activity - Consult OT/PT to assist with strengthening/mobility - Keep Call bell within reach- Keep bed low and locked with side rails adjusted as appropriate- Keep care items and personal belongings within reach- Initiate and maintain comfort rounds- Make Fall Risk Sign visible to staff-Apply yellow socks and bracelet for high fall risk patients- Consider moving patient to room near nurses station  Outcome: Progressing

## 2025-05-08 NOTE — TELEPHONE ENCOUNTER
pt calling asking for a [employer] 2plus sensors instead of the 3. He spoke with [employer] service. pts phone can connect  to the 3 plus.       Please advise.

## 2025-05-14 ENCOUNTER — OFFICE VISIT (OUTPATIENT)
Dept: PULMONOLOGY | Facility: CLINIC | Age: 70
End: 2025-05-14
Payer: MEDICARE

## 2025-05-14 VITALS
TEMPERATURE: 97.9 F | OXYGEN SATURATION: 97 % | SYSTOLIC BLOOD PRESSURE: 124 MMHG | WEIGHT: 212 LBS | HEIGHT: 73 IN | BODY MASS INDEX: 28.1 KG/M2 | HEART RATE: 73 BPM | DIASTOLIC BLOOD PRESSURE: 72 MMHG

## 2025-05-14 DIAGNOSIS — R91.1 PULMONARY NODULE: ICD-10-CM

## 2025-05-14 DIAGNOSIS — J44.9 CHRONIC OBSTRUCTIVE PULMONARY DISEASE, UNSPECIFIED COPD TYPE (HCC): ICD-10-CM

## 2025-05-14 DIAGNOSIS — J18.9 MULTIFOCAL PNEUMONIA: Primary | ICD-10-CM

## 2025-05-14 DIAGNOSIS — C91.10 CLL (CHRONIC LYMPHOCYTIC LEUKEMIA) (HCC): ICD-10-CM

## 2025-05-14 PROCEDURE — G2211 COMPLEX E/M VISIT ADD ON: HCPCS | Performed by: NURSE PRACTITIONER

## 2025-05-14 PROCEDURE — 99214 OFFICE O/P EST MOD 30 MIN: CPT | Performed by: NURSE PRACTITIONER

## 2025-05-14 NOTE — ASSESSMENT & PLAN NOTE
Known to Dr. Mejia, patient on ibrutinib and prednisone daily  Also receiving IVIG and Gazyva every 4 weeks  Last dose of IVIG 5/7/25

## 2025-05-14 NOTE — ASSESSMENT & PLAN NOTE
2021 PFT did not demonstrate significant obstruction however he does have a 66 pack  year history and subjective improvement on inhalers  Continue Dulera twice per day when needed; OK to pause presently  Albuterol HFA up to 4x per day if needed

## 2025-05-14 NOTE — ASSESSMENT & PLAN NOTE
Bronchoscopy did not disclose a clear pathogenic process. He has had improvement on antibiotic regimen and has completed all doses; now feeling back to normal.  Will repeat CT imaging in 3 months to track improvement  Orders:    CT chest without contrast; Future

## 2025-05-14 NOTE — PROGRESS NOTES
Follow-up  Visit - Pulmonary Medicine   Name: Cayetano Lucero      : 1955      MRN: [id number]  Encounter Provider: TEODORO Chavarria  Encounter Date: 2025   Encounter department: [facility] [place]  :  Assessment & Plan  Multifocal pneumonia  Bronchoscopy did not disclose a clear pathogenic process. He has had improvement on antibiotic regimen and has completed all doses; now feeling back to normal.  Will repeat CT imaging in 3 months to track improvement  Orders:    CT chest without contrast; Future    Chronic obstructive pulmonary disease, unspecified COPD type (HCC)   PFT did not demonstrate significant obstruction however he does have a 66 pack  year history and subjective improvement on inhalers  Continue Dulera twice per day when needed; OK to pause presently  Albuterol HFA up to 4x per day if needed       Pulmonary nodule  7mm groundglass left upper lobe nodule with no solid component; increased in size compared with 4mm in . Will monitor on upcoming scans.       CLL (chronic lymphocytic leukemia) ([facility])  Known to Dr. Mejia, patient on ibrutinib and prednisone daily  Also receiving IVIG and Gazyva every 4 weeks  Last dose of IVIG 25           Return in about 4 months (around 2025).    History of Present Illness   Cayetano Lucero is a 69 y.o. male who presents for follow up after recent hospitalization 25-25 for multifocal pneumonia; he presented with fever, myalgias, SOB and chills. He has PMHx significant for CLL with hypogammaglobulinemia and chronic immunocompromise. He did have bronchoscopy during hospitalization and started IV antibiotics. He has had multiple hospitalizations for multifocal pneumonia and prior had positive culture H. Influenzae. Bronchoscopy was performed 25 and no abnormal culture / cytology / pathology noted.    Today he feels significantly improved although he admits he is deconditioned from the recurrent illnesses.  He has resumed yard work, mowing, and household tasks without limitation. He does have some shortness of breath at his baseline and a very mild cough with no mucus production; symptoms are worse with exertion. He was diagnosed COPD 15-20 years ago, maybe more.     He is taking Dulera when he has active symptoms but not on a regular basis.     Patient lives in [place] with his wife. They have 6 cats.   Highest level of education is HS graduate.  Worked in [employer], now retired    Tobacco Use: Nonsmoking x14 years; 66 pack year history    Functional status: Independent        Review of Systems  Please note that a 14-point review of systems was performed to include Constitutional, HEENT, Respiratory, CVS, GI, , Musculoskeletal, Integumentary, Neurologic, Rheumatologic, Endocrinologic, Psychiatric, Lymphatic, and Hematologic/Oncologic systems were reviewed and are negative unless otherwise stated in HPI. Positive and negative findings pertinent to this evaluation are incorporated into the history of present illness.       Medications Ordered Prior to Encounter[1]   Social History     Tobacco Use    Smoking status: Former     Current packs/day: 0.00     Average packs/day: 2.0 packs/day for 33.0 years (66.0 ttl pk-yrs)     Types: Cigarettes     Start date:      Quit date:      Years since quittin.3     Passive exposure: Past    Smokeless tobacco: Never   Vaping Use    Vaping status: Never Used   Substance and Sexual Activity    Alcohol use: Yes     Alcohol/week: 2.0 standard drinks of alcohol     Types: 2 Cans of beer per week     Comment: social use as per [employer]    Drug use: Never    Sexual activity: Not on file        Medical History Reviewed by provider this encounter:  Tobacco  Allergies  Meds  Problems  Med Hx  Surg Hx  Fam Hx     .    Objective   /72 (BP Location: Right arm, Patient Position: Sitting, Cuff Size: Large)   Pulse 73   Temp 97.9 °F (36.6 °C) (Tympanic)    "Ht 6' 1\" (1.854 m)   Wt 96.2 kg (212 lb)   SpO2 97%   BMI 27.97 kg/m²     Physical Exam  Vitals reviewed.   Constitutional:       Appearance: Normal appearance.   HENT:      Head: Normocephalic.      Nose: Nose normal.      Mouth/Throat:      Mouth: Mucous membranes are moist.      Pharynx: Oropharynx is clear.     Cardiovascular:      Rate and Rhythm: Normal rate and regular rhythm.      Pulses: Normal pulses.      Heart sounds: Normal heart sounds.   Pulmonary:      Effort: Pulmonary effort is normal.      Breath sounds: Normal breath sounds.     Musculoskeletal:         General: Normal range of motion.     Skin:     General: Skin is warm.      Capillary Refill: Capillary refill takes less than 2 seconds.     Neurological:      General: No focal deficit present.      Mental Status: He is alert and oriented to person, place, and time.     Psychiatric:         Behavior: Behavior normal.         Diagnostic Data:  Labs: I personally reviewed the most recent laboratory data pertinent to today's visit.  Lab Results   Component Value Date    WBC 4.95 04/27/2025    HGB 12.0 04/27/2025    HCT 36.2 (L) 04/27/2025    MCV 89 04/27/2025     04/27/2025    EOSPCT 8 (H) 04/27/2025    EOSABS 0.40 04/27/2025    SEGSPCT 58 04/27/2025    MONOPCT 9 04/27/2025    MONOABS 0.45 04/27/2025    BANDSPCT 5 04/12/2025     Lab Results   Component Value Date    GLUCOSE 109 12/29/2015    CALCIUM 9.2 04/27/2025     12/29/2015    K 3.7 04/27/2025    CO2 27 04/27/2025     04/27/2025    BUN 14 04/27/2025    CREATININE 0.87 04/27/2025     No results found for: \"IGE\", \"RAST\"  Lab Results   Component Value Date    ALT 28 04/23/2025    AST 26 04/23/2025    ALKPHOS 42 04/23/2025    BILITOT 0.7 12/29/2015     Bronchoscopy 4/25/25:    Final Diagnosis   A-B. Lung, Left Lower Lobe, Bronchoalveolar Lavage (ThinPrep and cell block preparations):  Negative for malignancy.  Predominantly neutrophils and acute inflammatory debris, consistent " with history of pneumonia.  Alveolar macrophages and bronchial epithelium.  Squamous cells.           Component  Ref Range & Units (hover) 4/25/25  3:34 PM 4/25/25  3:32 PM   PNEUMOCYSTIS PCR Negative         AFB CULTURE No AFB Isolated at 2 Weeks            AFB FLUORESCENT STAIN No acid fast bacilli seen           Bronchial Culture 1+ Growth of   Mixed Respiratory deshawn            GRAM STAIN RESULT 1+ Polys   No bacteria seen           Fungus (Mycology) Culture 1+ Growth of Candida albicans Abnormal           Radiology results:  Radiology Results Review: I have reviewed the following images/report studies in PACS:   CT chest 4/23/25  1) Irregular lower lobe opacities in the central, partially groundglass opacity in the right upper lobe, increased from 4/9/2025. These findings likely represent worsening multifocal pneumonia. Other considerations include pneumonitis.     2) Some reticular opacities in the lower lobes and in the right upper lobe, as well as mild diffuse subpleural reticulation was also present on the 2022 CT, and may indicate a component of underlying interstitial lung disease. No honeycombing.   Pulmonology consultation may be of value.     3) Bronchial wall thickening with some mucous plugging in the lower lobes, likely also related to the aforementioned process.     4) 7 mm left upper lobe groundglass nodule with no associated solid component, increased from 4 mm in 2022. This may represent an adenocarcinoma spectrum lesion. Follow-up noncontrast chest CT is recommended in 12 months.     5) Circumferential wall thickening of the distal esophagus, which may be related to esophagitis or gastroesophageal reflux.     6) Additional findings as above    ECHO 4/11/25  LVEF 52%; indeterminate diastolic function        TEODORO Chavarria           [1]   Current Outpatient Medications on File Prior to Visit   Medication Sig Dispense Refill    acyclovir (ZOVIRAX) 400 MG tablet TAKE 1 TABLET TWICE A DAY 60  tablet 11    albuterol (2.5 mg/3 mL) 0.083 % nebulizer solution Take 3 mL (2.5 mg total) by nebulization every 6 (six) hours as needed for wheezing or shortness of breath 180 mL 5    albuterol (Ventolin HFA) 90 mcg/act inhaler USE 2 INHALATIONS EVERY 6 HOURS AS NEEDED FOR WHEEZING 54 g 5    amLODIPine (NORVASC) 10 mg tablet TAKE 1 TABLET DAILY 90 tablet 1    aspirin 81 MG tablet Take 81 mg by mouth in the morning. Every other day.      benzonatate (TESSALON) 200 MG capsule Take 1 capsule (200 mg total) by mouth 3 (three) times a day as needed for cough 21 capsule 0    Blood Glucose Monitoring Suppl (FreeStyle Freedom Lite) w/Device KIT Use 3 (three) times a day 1 kit 0    citalopram (CeleXA) 40 mg tablet TAKE 1 TABLET DAILY 90 tablet 1    Continuous Blood Gluc  (FreeStyle Ashley 2 [place]) JOE Use to scan sensor premeals and at bedtime 1 each 1    Continuous Glucose Sensor (FreeStyle Ashley 2 Sensor) MISC Apply 1 sensor every 14 days. Use as directed with Freestyle Ashley 2 reader. 6 each 1    Diclofenac Sodium (VOLTAREN) 1 % Apply 4 g topically 4 (four) times a day 100 g 0    ezetimibe (ZETIA) 10 mg tablet Take 1 tablet (10 mg total) by mouth daily at bedtime 30 tablet 0    folic acid (FOLVITE) 1 mg tablet Take 800 mcg by mouth in the morning.      gabapentin (NEURONTIN) 600 MG tablet TAKE 1 TABLET DAILY 90 tablet 1    glucose blood (FREESTYLE LITE) test strip Check blood sugar 3 times a day 300 strip 1    hydroCHLOROthiazide 12.5 mg tablet Take 1 tablet (12.5 mg total) by mouth daily Do not start before April 14, 2025. 30 tablet 0    Ibrutinib 140 MG CAPS Take 1 capsule (140 mg total) by mouth daily 30 capsule 5    Injection Device for Insulin (CeQur Simplicity 2U) JOE Use 1 patch every 3 days, disp 1 box of 10 patches for 30 days supply 1 each 3    Injection Device for Insulin (CeQur Simplicity ) MISC Use to insert simplicity device. 1 each 1    Insulin Pen Needle (BD Pen Needle Inez U/F) 32G X 4 MM  MISC Use 3 (three) times a day 300 each 1    Lancets (FREESTYLE) lancets Use as instructed--test 2 times a day    E 11.9 100 each 0    Lancets (freestyle) lancets TEST BLOOD SUGAR 3 TIMES A  each 1    LORazepam (ATIVAN) 0.5 mg tablet Take 1 tablet (0.5 mg total) by mouth daily as needed for anxiety 30 tablet 0    losartan (COZAAR) 100 MG tablet TAKE 1 TABLET DAILY 60 tablet 5    mometasone-formoterol (Dulera) 200-5 MCG/ACT inhaler Inhale 2 puffs 2 (two) times a day Rinse mouth after use. 13 g 1    multivitamin (THERAGRAN) TABS Take 1 tablet by mouth in the morning.      naloxone (NARCAN) 4 mg/0.1 mL nasal spray Administer 1 spray into a nostril. If no response after 2-3 minutes, give another dose in the other nostril using a new spray. 1 each 1    obinutuzumab (GAZYVA) 1000 mg/40 mL SOLN Inject into a catheter in a vein      oxyCODONE (ROXICODONE) 10 MG TABS Take 1 tablet (10 mg total) by mouth every 6 (six) hours as needed for moderate pain Max Daily Amount: 40 mg 90 tablet 0    predniSONE 5 mg tablet Take 1 tablet (5 mg total) by mouth daily 90 tablet 0    Tresiba FlexTouch 100 units/mL injection pen INJECT 12 UNITS SUB-Q DAILY AS DIRECTED. 15 mL 5    Vonoprazan Fumarate 20 MG TABS Take 20 mg by mouth in the morning 30 tablet 5     No current facility-administered medications on file prior to visit.

## 2025-05-14 NOTE — ASSESSMENT & PLAN NOTE
7mm groundglass left upper lobe nodule with no solid component; increased in size compared with 4mm in 2022. Will monitor on upcoming scans.

## 2025-05-16 ENCOUNTER — DOCUMENTATION (OUTPATIENT)
Dept: HEMATOLOGY ONCOLOGY | Facility: CLINIC | Age: 70
End: 2025-05-16

## 2025-05-16 NOTE — PROGRESS NOTES
PT called to inquire about process for Imbruvica FD. PT has not called in RX for a few months and it still arrived, but PT got nervous that he needs to call it in. 2 weeks left of RX. PT does not have pharmacy #.  Notified DARRICK Martinez, MPH  Phone: [phone]  Email: [email or web address]

## 2025-05-19 ENCOUNTER — TELEPHONE (OUTPATIENT)
Dept: ENDOCRINOLOGY | Facility: CLINIC | Age: 70
End: 2025-05-19
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
## 2025-05-19 NOTE — TELEPHONE ENCOUNTER
Patient called the med refill line to say that he called the office about 2-3 hours ago and was waiting for someone to get back to him about his insulin, he said he went to the pharmacy to  his insulin and they told him that his insulin was discontinued, so when he got home he called the office, but no one got back to him, so he called the med refill line and I transferred him to the office

## 2025-05-19 NOTE — TELEPHONE ENCOUNTER
Patient called stating he went to the pharmacy to  his insulin Lispro and was told it was discontinued by the doctor. Patient is asking why this was discontinued. He is still taking insulin Lispro 14-18 units tid/ac. Please send script to [facility] in [place]. He has 2 pens left.

## 2025-05-20 DIAGNOSIS — E11.9 TYPE 2 DIABETES MELLITUS WITHOUT COMPLICATION, WITH LONG-TERM CURRENT USE OF INSULIN (HCC): ICD-10-CM

## 2025-05-20 DIAGNOSIS — E11.42 TYPE 2 DIABETES MELLITUS WITH DIABETIC POLYNEUROPATHY, WITH LONG-TERM CURRENT USE OF INSULIN (HCC): Primary | ICD-10-CM

## 2025-05-20 DIAGNOSIS — Z79.4 TYPE 2 DIABETES MELLITUS WITHOUT COMPLICATION, WITH LONG-TERM CURRENT USE OF INSULIN (HCC): ICD-10-CM

## 2025-05-20 DIAGNOSIS — Z79.4 TYPE 2 DIABETES MELLITUS WITH DIABETIC POLYNEUROPATHY, WITH LONG-TERM CURRENT USE OF INSULIN (HCC): Primary | ICD-10-CM

## 2025-05-20 RX ORDER — SODIUM CHLORIDE 9 MG/ML
20 INJECTION, SOLUTION INTRAVENOUS ONCE
Status: CANCELLED | OUTPATIENT
Start: 2025-05-22

## 2025-05-20 RX ORDER — INSULIN DEGLUDEC 100 U/ML
INJECTION, SOLUTION SUBCUTANEOUS
Qty: 15 ML | Refills: 5 | Status: SHIPPED | OUTPATIENT
Start: 2025-05-20 | End: 2025-05-21

## 2025-05-20 RX ORDER — ACETAMINOPHEN 325 MG/1
650 TABLET ORAL ONCE
Status: CANCELLED | OUTPATIENT
Start: 2025-05-22

## 2025-05-20 RX ORDER — INSULIN ASPART 100 [IU]/ML
18 INJECTION, SOLUTION INTRAVENOUS; SUBCUTANEOUS
Qty: 15 ML | Refills: 2 | Status: SHIPPED | OUTPATIENT
Start: 2025-05-20 | End: 2025-05-21

## 2025-05-20 NOTE — TELEPHONE ENCOUNTER
Medication not on medlist. Sent refills for tresiba. Patients plan only covers novolog and insulin aspart (admelof not on formulary). Please advise

## 2025-05-21 ENCOUNTER — TELEPHONE (OUTPATIENT)
Dept: ENDOCRINOLOGY | Facility: CLINIC | Age: 70
End: 2025-05-21

## 2025-05-21 DIAGNOSIS — Z79.4 TYPE 2 DIABETES MELLITUS WITHOUT COMPLICATION, WITH LONG-TERM CURRENT USE OF INSULIN (HCC): ICD-10-CM

## 2025-05-21 DIAGNOSIS — E11.42 TYPE 2 DIABETES MELLITUS WITH DIABETIC POLYNEUROPATHY, WITH LONG-TERM CURRENT USE OF INSULIN (HCC): ICD-10-CM

## 2025-05-21 DIAGNOSIS — E11.9 TYPE 2 DIABETES MELLITUS WITHOUT COMPLICATION, WITH LONG-TERM CURRENT USE OF INSULIN (HCC): ICD-10-CM

## 2025-05-21 DIAGNOSIS — Z79.4 TYPE 2 DIABETES MELLITUS WITH DIABETIC POLYNEUROPATHY, WITH LONG-TERM CURRENT USE OF INSULIN (HCC): ICD-10-CM

## 2025-05-21 RX ORDER — INSULIN ASPART 100 [IU]/ML
INJECTION, SOLUTION INTRAVENOUS; SUBCUTANEOUS
Qty: 15 ML | Refills: 2 | Status: SHIPPED | OUTPATIENT
Start: 2025-05-21

## 2025-05-21 RX ORDER — INSULIN DEGLUDEC 100 U/ML
INJECTION, SOLUTION SUBCUTANEOUS
Qty: 15 ML | Refills: 5 | Status: SHIPPED | OUTPATIENT
Start: 2025-05-21

## 2025-05-21 NOTE — TELEPHONE ENCOUNTER
Patient returning a call in regards to Novolog order- states the script reads to take 18u TID w/meals. Patient states he feels this is to much to inject w every meal- states he has been administering 14u with meals only increasing to 18u if needed based on his bg level prior to meal. He is asking if script can be changed and resent to the pharmacy for Novolog- Inject 14-18 units TID/ac?   Patient also stating he has not been administering his Tresiba every night as ordered- states he is only giving Tresiba- 18 units before bed if his bg level is 150 or above. States if he takes Tresiba for bg of 150 or below prior to bed that his bg will drop into the 60's in the early morning requiring patient to consume a snack or drink to bring levels back up. He is asking if any adjustments need to be made to Tresiba? Patient does have Ashley 2 sensor that is connected to the office.   Patient also stating he has not heard from [employer] in regards to his new Ashley 2+ sensors that were supposed to be shipped out to him- he is asking if someone could follow up with this to see why he has not yet received the new sensors. Please advise, patient is requesting a callback. Thank you.

## 2025-05-22 ENCOUNTER — HOSPITAL ENCOUNTER (OUTPATIENT)
Dept: INFUSION CENTER | Facility: CLINIC | Age: 70
Discharge: HOME | End: 2025-05-22
Attending: INTERNAL MEDICINE
Payer: MEDICARE

## 2025-05-22 VITALS
TEMPERATURE: 96.8 F | DIASTOLIC BLOOD PRESSURE: 86 MMHG | SYSTOLIC BLOOD PRESSURE: 161 MMHG | HEART RATE: 65 BPM | RESPIRATION RATE: 18 BRPM

## 2025-05-22 DIAGNOSIS — C91.10 CLL (CHRONIC LYMPHOCYTIC LEUKEMIA) (HCC): Primary | ICD-10-CM

## 2025-05-22 LAB
ALBUMIN SERPL BCG-MCNC: 4 G/DL (ref 3.5–5)
ALP SERPL-CCNC: 49 U/L (ref 34–104)
ALT SERPL W P-5'-P-CCNC: 32 U/L (ref 7–52)
ANION GAP SERPL CALCULATED.3IONS-SCNC: 6 MMOL/L (ref 4–13)
AST SERPL W P-5'-P-CCNC: 26 U/L (ref 13–39)
BASOPHILS # BLD MANUAL: 0 THOUSAND/UL (ref 0–0.1)
BASOPHILS NFR MAR MANUAL: 0 % (ref 0–1)
BILIRUB SERPL-MCNC: 0.73 MG/DL (ref 0.2–1)
BUN SERPL-MCNC: 16 MG/DL (ref 5–25)
CALCIUM SERPL-MCNC: 8.9 MG/DL (ref 8.4–10.2)
CHLORIDE SERPL-SCNC: 107 MMOL/L (ref 96–108)
CO2 SERPL-SCNC: 26 MMOL/L (ref 21–32)
CREAT SERPL-MCNC: 0.81 MG/DL (ref 0.6–1.3)
EOSINOPHIL # BLD MANUAL: 0.32 THOUSAND/UL (ref 0–0.4)
EOSINOPHIL NFR BLD MANUAL: 6 % (ref 0–6)
ERYTHROCYTE [DISTWIDTH] IN BLOOD BY AUTOMATED COUNT: 15.2 % (ref 11.6–15.1)
GFR SERPL CREATININE-BSD FRML MDRD: 90 ML/MIN/1.73SQ M
GLUCOSE SERPL-MCNC: 124 MG/DL (ref 65–140)
HCT VFR BLD AUTO: 42.9 % (ref 36.5–49.3)
HGB BLD-MCNC: 14.2 G/DL (ref 12–17)
IGG SERPL-MCNC: 658 MG/DL (ref 635–1741)
LYMPHOCYTES # BLD AUTO: 1.12 THOUSAND/UL (ref 0.6–4.47)
LYMPHOCYTES # BLD AUTO: 21 % (ref 14–44)
MCH RBC QN AUTO: 30.3 PG (ref 26.8–34.3)
MCHC RBC AUTO-ENTMCNC: 33.1 G/DL (ref 31.4–37.4)
MCV RBC AUTO: 92 FL (ref 82–98)
MONOCYTES # BLD AUTO: 0.8 THOUSAND/UL (ref 0–1.22)
MONOCYTES NFR BLD: 15 % (ref 4–12)
NEUTROPHILS # BLD MANUAL: 3.1 THOUSAND/UL (ref 1.85–7.62)
NEUTS SEG NFR BLD AUTO: 58 % (ref 43–75)
PLATELET # BLD AUTO: 164 THOUSANDS/UL (ref 149–390)
PLATELET BLD QL SMEAR: ADEQUATE
PMV BLD AUTO: 11.8 FL (ref 8.9–12.7)
POTASSIUM SERPL-SCNC: 3.8 MMOL/L (ref 3.5–5.3)
PROT SERPL-MCNC: 6.3 G/DL (ref 6.4–8.4)
RBC # BLD AUTO: 4.68 MILLION/UL (ref 3.88–5.62)
RBC MORPH BLD: NORMAL
RETICS # AUTO: ABNORMAL 10*3/UL (ref 14356–105094)
RETICS # CALC: 2.05 % (ref 0.37–1.87)
SODIUM SERPL-SCNC: 139 MMOL/L (ref 135–147)
WBC # BLD AUTO: 5.34 THOUSAND/UL (ref 4.31–10.16)

## 2025-05-22 PROCEDURE — 80053 COMPREHEN METABOLIC PANEL: CPT | Performed by: INTERNAL MEDICINE

## 2025-05-22 PROCEDURE — 96415 CHEMO IV INFUSION ADDL HR: CPT

## 2025-05-22 PROCEDURE — 82784 ASSAY IGA/IGD/IGG/IGM EACH: CPT | Performed by: PHYSICIAN ASSISTANT

## 2025-05-22 PROCEDURE — 96367 TX/PROPH/DG ADDL SEQ IV INF: CPT

## 2025-05-22 PROCEDURE — 85007 BL SMEAR W/DIFF WBC COUNT: CPT | Performed by: INTERNAL MEDICINE

## 2025-05-22 PROCEDURE — 85027 COMPLETE CBC AUTOMATED: CPT | Performed by: INTERNAL MEDICINE

## 2025-05-22 PROCEDURE — 85045 AUTOMATED RETICULOCYTE COUNT: CPT | Performed by: PHYSICIAN ASSISTANT

## 2025-05-22 PROCEDURE — 96413 CHEMO IV INFUSION 1 HR: CPT

## 2025-05-22 RX ORDER — ACETAMINOPHEN 325 MG/1
650 TABLET ORAL ONCE
Status: COMPLETED | OUTPATIENT
Start: 2025-05-22 | End: 2025-05-22

## 2025-05-22 RX ORDER — SODIUM CHLORIDE 9 MG/ML
20 INJECTION, SOLUTION INTRAVENOUS ONCE
Status: COMPLETED | OUTPATIENT
Start: 2025-05-22 | End: 2025-05-22

## 2025-05-22 RX ADMIN — DIPHENHYDRAMINE HYDROCHLORIDE 25 MG: 50 INJECTION, SOLUTION INTRAMUSCULAR; INTRAVENOUS at 08:55

## 2025-05-22 RX ADMIN — SODIUM CHLORIDE 20 ML/HR: 0.9 INJECTION, SOLUTION INTRAVENOUS at 08:30

## 2025-05-22 RX ADMIN — ACETAMINOPHEN 650 MG: 325 TABLET, FILM COATED ORAL at 08:33

## 2025-05-22 RX ADMIN — OBINUTUZUMAB 1000 MG: 1000 INJECTION, SOLUTION, CONCENTRATE INTRAVENOUS at 09:54

## 2025-05-22 RX ADMIN — DEXAMETHASONE SODIUM PHOSPHATE 20 MG: 10 INJECTION, SOLUTION INTRAMUSCULAR; INTRAVENOUS at 08:33

## 2025-05-22 NOTE — PROGRESS NOTES
Patient presents for labs and Gazyva today and is without complaints at this time. Per orders, no lab parameters, okay to draw labs and proceed without results. Labs drawn per protocol. Patient tolerated treatment without incident. Declines AVS. Aware of next appointment on 6/3 @ 8am.

## 2025-05-23 PROBLEM — J18.9 MULTIFOCAL PNEUMONIA: Status: RESOLVED | Noted: 2022-04-12 | Resolved: 2025-05-23

## 2025-05-23 NOTE — TELEPHONE ENCOUNTER
Patient is on medicare so sensors have to get sent through DME I resent the merary 2 sensors so [employer] should be reaching out to patient for delivery details.

## 2025-05-23 NOTE — TELEPHONE ENCOUNTER
Left detailed message for patient. We need to verify if patient is using merary 2 or merary 3 plus sensors. There are two orders for him and the merary 3 is pending.

## 2025-05-23 NOTE — TELEPHONE ENCOUNTER
Pt returned call stating he needs the Ashley 2 plus. He canceled the Ashley 3 plus order. Please send the Ashley 2 plus to the pharmacy

## 2025-05-29 LAB
DME PARACHUTE DELIVERY DATE ACTUAL: NORMAL
DME PARACHUTE DELIVERY DATE EXPECTED: NORMAL
DME PARACHUTE DELIVERY DATE REQUESTED: NORMAL
DME PARACHUTE ITEM DESCRIPTION: NORMAL
DME PARACHUTE ITEM DESCRIPTION: NORMAL
DME PARACHUTE ORDER STATUS: NORMAL
DME PARACHUTE SUPPLIER NAME: NORMAL
DME PARACHUTE SUPPLIER PHONE: NORMAL

## 2025-06-03 ENCOUNTER — HOSPITAL ENCOUNTER (OUTPATIENT)
Dept: INFUSION CENTER | Facility: CLINIC | Age: 70
Discharge: HOME | End: 2025-06-03
Attending: INTERNAL MEDICINE
Payer: MEDICARE

## 2025-06-03 VITALS
RESPIRATION RATE: 18 BRPM | HEART RATE: 69 BPM | SYSTOLIC BLOOD PRESSURE: 162 MMHG | BODY MASS INDEX: 27.97 KG/M2 | TEMPERATURE: 97.4 F | WEIGHT: 212 LBS | DIASTOLIC BLOOD PRESSURE: 93 MMHG

## 2025-06-03 DIAGNOSIS — C91.10 CLL (CHRONIC LYMPHOCYTIC LEUKEMIA) (HCC): ICD-10-CM

## 2025-06-03 DIAGNOSIS — D80.1 HYPOGAMMAGLOBULINEMIA, ACQUIRED (HCC): ICD-10-CM

## 2025-06-03 DIAGNOSIS — R76.8 LOW SERUM IGG FOR AGE: Primary | ICD-10-CM

## 2025-06-03 PROCEDURE — 96367 TX/PROPH/DG ADDL SEQ IV INF: CPT

## 2025-06-03 PROCEDURE — 96366 THER/PROPH/DIAG IV INF ADDON: CPT

## 2025-06-03 PROCEDURE — 96365 THER/PROPH/DIAG IV INF INIT: CPT

## 2025-06-03 RX ORDER — ACETAMINOPHEN 325 MG/1
650 TABLET ORAL ONCE
Status: COMPLETED | OUTPATIENT
Start: 2025-06-03 | End: 2025-06-03

## 2025-06-03 RX ORDER — SODIUM CHLORIDE 9 MG/ML
20 INJECTION, SOLUTION INTRAVENOUS ONCE
OUTPATIENT
Start: 2025-06-04

## 2025-06-03 RX ORDER — SODIUM CHLORIDE 9 MG/ML
20 INJECTION, SOLUTION INTRAVENOUS ONCE
Status: COMPLETED | OUTPATIENT
Start: 2025-06-03 | End: 2025-06-03

## 2025-06-03 RX ORDER — ACETAMINOPHEN 325 MG/1
650 TABLET ORAL ONCE
OUTPATIENT
Start: 2025-06-04

## 2025-06-03 RX ADMIN — DEXAMETHASONE SODIUM PHOSPHATE 4 MG: 10 INJECTION, SOLUTION INTRAMUSCULAR; INTRAVENOUS at 09:04

## 2025-06-03 RX ADMIN — SODIUM CHLORIDE 20 ML/HR: 0.9 INJECTION, SOLUTION INTRAVENOUS at 08:29

## 2025-06-03 RX ADMIN — Medication 37.5 G: at 09:34

## 2025-06-03 RX ADMIN — DIPHENHYDRAMINE HYDROCHLORIDE 12.5 MG: 50 INJECTION INTRAMUSCULAR; INTRAVENOUS at 08:42

## 2025-06-03 RX ADMIN — ACETAMINOPHEN 650 MG: 325 TABLET ORAL at 08:25

## 2025-06-03 NOTE — PROGRESS NOTES
Cayetano Lucero  tolerated IVIG without incident.     Cayetano Lucero is aware of future appt on 6/17 at 8am.     AVS printed and given to Cayetano Lucero:    No (Declined by Cayetano Lucero)

## 2025-06-04 ENCOUNTER — OFFICE VISIT (OUTPATIENT)
Dept: HEMATOLOGY ONCOLOGY | Facility: CLINIC | Age: 70
End: 2025-06-04
Payer: MEDICARE

## 2025-06-04 VITALS
RESPIRATION RATE: 18 BRPM | BODY MASS INDEX: 28.49 KG/M2 | HEIGHT: 73 IN | HEART RATE: 90 BPM | WEIGHT: 215 LBS | OXYGEN SATURATION: 98 % | TEMPERATURE: 98.6 F | SYSTOLIC BLOOD PRESSURE: 136 MMHG | DIASTOLIC BLOOD PRESSURE: 80 MMHG

## 2025-06-04 DIAGNOSIS — J44.9 CHRONIC OBSTRUCTIVE PULMONARY DISEASE, UNSPECIFIED COPD TYPE (HCC): ICD-10-CM

## 2025-06-04 DIAGNOSIS — Z86.2 HISTORY OF AUTOIMMUNE HEMOLYTIC ANEMIA: ICD-10-CM

## 2025-06-04 DIAGNOSIS — R76.8 LOW SERUM IGG FOR AGE: ICD-10-CM

## 2025-06-04 DIAGNOSIS — N18.30 STAGE 3 CHRONIC KIDNEY DISEASE, UNSPECIFIED WHETHER STAGE 3A OR 3B CKD (HCC): ICD-10-CM

## 2025-06-04 DIAGNOSIS — D80.1 HYPOGAMMAGLOBULINEMIA, ACQUIRED (HCC): ICD-10-CM

## 2025-06-04 DIAGNOSIS — J45.41 MODERATE PERSISTENT ASTHMATIC BRONCHITIS WITH ACUTE EXACERBATION: ICD-10-CM

## 2025-06-04 DIAGNOSIS — I25.10 CORONARY ARTERY DISEASE INVOLVING NATIVE CORONARY ARTERY OF NATIVE HEART WITHOUT ANGINA PECTORIS: ICD-10-CM

## 2025-06-04 DIAGNOSIS — E11.42 TYPE 2 DIABETES MELLITUS WITH DIABETIC POLYNEUROPATHY, WITH LONG-TERM CURRENT USE OF INSULIN (HCC): ICD-10-CM

## 2025-06-04 DIAGNOSIS — C91.10 CLL (CHRONIC LYMPHOCYTIC LEUKEMIA) (HCC): Primary | ICD-10-CM

## 2025-06-04 DIAGNOSIS — I10 PRIMARY HYPERTENSION: ICD-10-CM

## 2025-06-04 DIAGNOSIS — D75.829 HIT (HEPARIN-INDUCED THROMBOCYTOPENIA) (HCC): ICD-10-CM

## 2025-06-04 DIAGNOSIS — Z79.4 TYPE 2 DIABETES MELLITUS WITH DIABETIC POLYNEUROPATHY, WITH LONG-TERM CURRENT USE OF INSULIN (HCC): ICD-10-CM

## 2025-06-04 PROBLEM — J45.909 ASTHMATIC BRONCHITIS: Status: ACTIVE | Noted: 2025-06-04

## 2025-06-04 PROCEDURE — 99214 OFFICE O/P EST MOD 30 MIN: CPT | Performed by: INTERNAL MEDICINE

## 2025-06-04 PROCEDURE — G2211 COMPLEX E/M VISIT ADD ON: HCPCS | Performed by: INTERNAL MEDICINE

## 2025-06-04 NOTE — ASSESSMENT & PLAN NOTE
Follows with PCP and was recently seen by lung specialist and was hospitalized with pneumonia

## 2025-06-04 NOTE — PROGRESS NOTES
Name: Cayetano Lucero      : 1955      MRN: [id number]  Encounter Provider: Gideon Meija MD  Encounter Date: 2025   Encounter department: [facility] [place].      Assessment & Plan  CLL (chronic lymphocytic leukemia) (HCC)  69 yoM with stage IV CLL (Dx >20 years ago, complicated by episode of severe AIHA, treated with several lines of therapy including fludarabine, pentostatin, cyclophosphamide, vincristine, prednisone, rituximab, chlorambucil, venetoclax, and most recently on ibrutinib 140 mg daily + obinutuzumab every 4 weeks, he is on IVIG every four weeks + prednisone 5 mg daily + folic acid daily to prevent infection and because of previous history of autoimmune hemolytic anemia)    Continue ibrutinib 140 mg daily + obinutuzumab every 4 weeks for stage IV CLL  Continue IVIG every four weeks + prednisone 5 mg daily + folic acid daily to prevent infection and because of previous history of autoimmune hemolytic anemia  Office visit in three months       Moderate persistent asthmatic bronchitis with acute exacerbation  Recent hospitalization because of worsening of respiratory status and was found to have pneumonia.        Hypogammaglobulinemia, acquired (HCC)  On IVIG therapy        Low serum IgG for age  On IVIG therapy        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[facility])  Patient is aware of heparin allergy        Chronic obstructive pulmonary disease, unspecified COPD type ([facility])  Follows with PCP and was recently seen by lung specialist and was hospitalized with pneumonia        Type 2 diabetes mellitus with diabetic polyneuropathy, with long-term current use of insulin (HCC)  Managed by PCP  Lab Results   Component Value Date    HGBA1C 6.6 (A) 2025        Coronary artery disease involving native coronary artery of native heart without angina pectoris  Was seen by cardiologist       Primary hypertension  Managed by PCP        Stage 3 chronic kidney disease,  unspecified whether stage 3a or 3b CKD (HCC)  Lab Results   Component Value Date    EGFR 90 05/22/2025    EGFR 87 04/27/2025    EGFR 82 04/26/2025    CREATININE 0.81 05/22/2025    CREATININE 0.87 04/27/2025    CREATININE 0.93 04/26/2025   Managed by PCP        History of autoimmune hemolytic anemia  No active myelosis at present. AIHA was secondary to CLL        Patient has standing orders for blood work, IVIG and Gazyva.  Follow-up in 3 months.  CLL remains in remission.  AIHA remains in remission.  No change in treatments for CLL and AIHA.  IVIG to lower risk of infections.  Goal is to manage CLL, AIHA and prevention of infections and management of other problems listed above by PCP and other consultants.  Patient is capable of self-care.  All discussed in detail.  Questions answered.    I suggested eating healthy foods, activities as tolerated but to avoid falls and trauma.  Suggested health screening tests. Patient to continue to follow-up with primary physician and other consultants.  Provided counseling and support.  I used a dictation device to dictate this note and there could be mistakes in my note and for that patient may contact my office.    Additional recommendations to follow per attending, Dr. Mejia.    Rafael Burch DO, PGY4  Hematology/Oncology Fellow    History of Present Illness   No chief complaint on file.    Treated with several lines of therapy including fludarabine, pentostatin, cytoxan,Oncovin, prednisone, rituximab, chlorambucil, venetoclax, and most recently on ibrutinib and obinutuzumab    Follow-up visit for stage IV CLL, diagnosed 20+ years and had complications especially severe AIHA and had hospitalizations locally and at [facility].  Presently on 140 mg ibrutinib daily, IVIG every 4 weeks and Gazyva every 4 weeks.  Recent hospitalization with worsening of respiratory symptoms and pneumonia.  Has history of COPD with exacerbations.   In addition he is also on prednisone 5 mg daily, folic  acid and acyclovir.  He has been taking vitamin D and calcium.  Bone density test has shown mild case of osteopenia.  CLL was diagnosed more than 20 years ago and he had multiple lines of therapies and at 1 time his hemoglobin was down to 4.9 because of  autoimmune hemolytic anemia and CLL.    CLL was diagnosed several years ago. He had been through Fludara based chemotherapy and pentostatin based chemotherapy. He had increased hemolysis when he was on chemotherapy. His most recent chemotherapy treatment was Cytoxan, Oncovin, prednisone and Rituxan and last treatment was in December 2012.. In 2013 he was started on Rituxan, prednisone and folic acid because he started to hemolyse again. IVIG was tried unsuccessfully so far as hemolysis is concerned. Rituxan has controlled the hemolysis. ...  Information on the treatments from March 2017 onwards.  On 3/20/17 patient found to have hemoglobin of 6.2, ,000. He was admitted to [facility] for blood transfusion and plan to transfer to [facility].   On 3/20/17 WBC count 195,000, hemoglobin 4.9, platelet count 65. Direct coomb's was positive with undetectable haptoglobin. He was given 2 units of PRBCs, Solu-Medrol 1 g ×3 days and IVIG 1 g/kg daily ×3 days. His hemoglobin continued to drop. Bone marrow biopsy on 3/21 showed marrow cellularity of greater than 95% almost replaced by small lymphocytes, lambda light chain restricted, CD20 positive, CD19 positive, CD23 positive partially expressing CD11c and CD25 and CD5 positive and negative for CD 79 and CD38.  He was treated with single dose of chlorambucil to control his CLL.  3/22 second dose of chlorambucil, also Rituxan 375 mg/M ² autoimmune hemolytic anemia. WBC continued to rise, 266,000.  Patient was initiated with Venetoclax 20 mg daily. Tumor lysis monitored every 8 hours; given aggressive hydration and Lasix and remained euvolemic.  Later venetoclax was changed to  ibrutinib because of disease progression  Dec 2017- Imbruvica decreased to 140 mg PO daily due to thrombocytopenia .  Later Gazyva was added.  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 He was discharged on IV ampicillin. Echo negative for vegetations.     Patient had cataract surgeries in December 2023.  He has tiredness, chronic nonproductive cough, exertional dyspnea, occasionally wheezing, arthritic symptoms and for that he is on oxycodone.  He has  vascular purpura on forearms.  He has chronic lung disease, COPD.  History of diabetes mellitus, hypertension and coronary artery disease.  For dysphagia he had EGD and dilatations  and he follows with his gastroenterologist.  He has history of heparin-induced thrombocytopenia and he knows that he is allergic to heparin.  He has coronary artery disease and has 1 stent and he takes 81 mg aspirin daily with food.    Interval History: 6/4/2025  Patient presents for six week follow up last seen 4/16/2025.  He was admitted (4/23-4/27) for multifocal PNA treated with vancomycin and cefepime and inhalers/nebulizers with transition to PO antibiotics at discharge.  Bronchoscopy was non-diagnostic.  Labs (5/22/2025) showed SCr 0.81, all other aspects of CMP roughly wnl, WBC 5.3, Hb 14.2, and Plt 164.  He is doing well on present treatment plan so far as CLL is concerned.    Oncology History   Cancer Staging   No matching staging information was found for the patient.  Oncology History   CLL (chronic lymphocytic leukemia) ([facility])   8/22/2017 -  Chemotherapy    chlorambucil (LEUKERAN), 0.5 mg/kg, 6 of 6 cycles  obinutuzumab (GAZYVA) day 1 IVPB, 100 mg, 1 of 1 cycle  obinutuzumab (GAZYVA) day 2 titrated infusion, 900 mg, 1 of 1 cycle  obinutuzumab (GAZYVA) subsequent titrated infusion, 1,000 mg, 79 of 86 cycles  Administration: 1,000 mg (5/21/2019), 1,000 mg (6/18/2019), 1,000 mg (7/16/2019), 1,000 mg (8/13/2019), 1,000 mg (9/10/2019), 1,000 mg (10/8/2019),  1,000 mg (11/5/2019), 1,000 mg (12/3/2019), 1,000 mg (12/31/2019), 1,000 mg (1/28/2020), 1,000 mg (2/25/2020), 1,000 mg (3/24/2020), 1,000 mg (4/21/2020), 1,000 mg (5/19/2020), 1,000 mg (6/16/2020), 1,000 mg (7/14/2020), 1,000 mg (8/11/2020), 1,000 mg (9/9/2020), 1,000 mg (10/6/2020), 1,000 mg (11/3/2020), 1,000 mg (12/1/2020), 1,000 mg (1/26/2021), 1,000 mg (12/29/2020), 1,000 mg (2/23/2021), 1,000 mg (3/23/2021), 1,000 mg (4/20/2021), 1,000 mg (5/18/2021), 1,000 mg (6/15/2021), 1,000 mg (7/13/2021), 1,000 mg (8/10/2021), 1,000 mg (9/7/2021), 1,000 mg (10/5/2021), 1,000 mg (11/2/2021), 1,000 mg (11/30/2021), 1,000 mg (12/28/2021), 1,000 mg (1/25/2022), 1,000 mg (2/22/2022), 1,000 mg (3/22/2022), 1,000 mg (5/17/2022), 1,000 mg (6/14/2022), 1,000 mg (7/12/2022), 1,000 mg (8/9/2022), 1,000 mg (9/6/2022), 1,000 mg (10/4/2022), 1,000 mg (11/1/2022), 1,000 mg (11/29/2022), 1,000 mg (1/24/2023), 1,000 mg (2/21/2023), 1,000 mg (3/21/2023), 1,000 mg (4/18/2023), 1,000 mg (5/16/2023), 1,000 mg (6/13/2023), 1,000 mg (7/11/2023), 1,000 mg (8/8/2023), 1,000 mg (9/5/2023), 1,000 mg (10/3/2023), 1,000 mg (10/31/2023), 1,000 mg (11/28/2023), 1,000 mg (1/23/2024), 1,000 mg (2/20/2024), 1,000 mg (3/19/2024), 1,000 mg (4/16/2024), 1,000 mg (5/14/2024), 1,000 mg (7/9/2024), 1,000 mg (11/26/2024), 1,000 mg (8/6/2024), 1,000 mg (9/3/2024), 1,000 mg (10/1/2024), 1,000 mg (10/29/2024), 1,000 mg (12/24/2024), 1,000 mg (1/21/2025), 1,000 mg (2/18/2025)     4/24/2018 Initial Diagnosis    CLL (chronic lymphocytic leukemia) (HCC)        Pertinent Medical History   See details in HPI     Review of Systems  Reviewed 12 systems.  Symptoms are as in HPI.  No other neurological, cardiac, pulmonary, GI and  symptoms other than listed in HPI.  Presently no fevers, chills, bleeding, bone pains, skin rash, weight loss, night sweats and no swelling of the ankles and no swollen glands      Objective   There were no vitals taken for this visit.    Pain  Screening:     ECOG   1-2  Physical Exam  Vitals reviewed.   Constitutional:       General: He is not in acute distress.     Appearance: Normal appearance. He is not ill-appearing.   HENT:      Head: Normocephalic and atraumatic.      Mouth/Throat:      Comments: No thrush.    Eyes:      General: No scleral icterus.     Conjunctiva/sclera: Conjunctivae normal.       Cardiovascular:      Rate and Rhythm: Normal rate and regular rhythm.      Heart sounds: Murmur (Systolic murmur) heard.   Pulmonary:      Effort: Pulmonary effort is normal. No respiratory distress.      Breath sounds: Rhonchi present. No rales.   Abdominal:      General: Abdomen is flat. There is no distension.      Palpations: Abdomen is soft. There is no mass.      Tenderness: There is no abdominal tenderness.      Comments: No ascites.      Musculoskeletal:         General: No swelling or tenderness. Normal range of motion.      Cervical back: Normal range of motion. No rigidity or tenderness.      Right lower leg: No edema.      Left lower leg: No edema.      Comments: No calf tenderness.   Lymphadenopathy:      Cervical: No cervical adenopathy.      Upper Body:      Right upper body: No supraclavicular or axillary adenopathy.      Left upper body: No supraclavicular or axillary adenopathy.     Skin:     General: Skin is warm.      Coloration: Skin is not jaundiced or pale.      Findings: No bruising or rash.      Nails: There is no clubbing.      Comments: Vascular purpura forearms     Neurological:      General: No focal deficit present.      Mental Status: He is alert and oriented to person, place, and time.      Motor: No weakness.      Coordination: Coordination normal.      Gait: Gait normal.     Psychiatric:         Mood and Affect: Mood normal.         Behavior: Behavior normal.         Thought Content: Thought content normal.       Labs: I have reviewed the following labs:  Lab Results   Component Value Date/Time    WBC 5.34 05/22/2025  08:29 AM    RBC 4.68 05/22/2025 08:29 AM    Hemoglobin 14.2 05/22/2025 08:29 AM    Hematocrit 42.9 05/22/2025 08:29 AM    MCV 92 05/22/2025 08:29 AM    MCH 30.3 05/22/2025 08:29 AM    RDW 15.2 (H) 05/22/2025 08:29 AM    Platelets 164 05/22/2025 08:29 AM    Segmented % 75 04/10/2025 05:45 AM    Lymphocytes % 21 05/22/2025 08:29 AM    Lymphocytes % 5 (L) 04/10/2025 05:45 AM    Monocytes % 15 (H) 05/22/2025 08:29 AM    Monocytes % 8 04/10/2025 05:45 AM    Eosinophils % 6 05/22/2025 08:29 AM    Eosinophils Relative 0 04/10/2025 05:45 AM    Basophils % 0 05/22/2025 08:29 AM    Basophils Relative 1 04/10/2025 05:45 AM    Immature Grans % 11 (H) 04/10/2025 05:45 AM    Absolute Neutrophils 4.51 04/10/2025 05:45 AM     Lab Results   Component Value Date/Time    Potassium 3.8 05/22/2025 08:29 AM    Chloride 107 05/22/2025 08:29 AM    CO2 26 05/22/2025 08:29 AM    BUN 16 05/22/2025 08:29 AM    Creatinine 0.81 05/22/2025 08:29 AM    Glucose, Fasting 193 (H) 04/10/2025 04:35 AM    Calcium 8.9 05/22/2025 08:29 AM    AST 26 05/22/2025 08:29 AM    ALT 32 05/22/2025 08:29 AM    Alkaline Phosphatase 49 05/22/2025 08:29 AM    Total Protein 6.3 (L) 05/22/2025 08:29 AM    Albumin 4.0 05/22/2025 08:29 AM    Total Bilirubin 0.73 05/22/2025 08:29 AM    eGFR 90 05/22/2025 08:29 AM

## 2025-06-04 NOTE — ASSESSMENT & PLAN NOTE
69 yoM with stage IV CLL (Dx >20 years ago, complicated by episode of severe AIHA, treated with several lines of therapy including fludarabine, pentostatin, cyclophosphamide, vincristine, prednisone, rituximab, chlorambucil, venetoclax, and most recently on ibrutinib 140 mg daily + obinutuzumab every 4 weeks, he is on IVIG every four weeks + prednisone 5 mg daily + folic acid daily to prevent infection and because of previous history of autoimmune hemolytic anemia)    Continue ibrutinib 140 mg daily + obinutuzumab every 4 weeks for stage IV CLL  Continue IVIG every four weeks + prednisone 5 mg daily + folic acid daily to prevent infection and because of previous history of autoimmune hemolytic anemia  Office visit in three months

## 2025-06-05 NOTE — ASSESSMENT & PLAN NOTE
Lab Results   Component Value Date    EGFR 90 05/22/2025    EGFR 87 04/27/2025    EGFR 82 04/26/2025    CREATININE 0.81 05/22/2025    CREATININE 0.87 04/27/2025    CREATININE 0.93 04/26/2025   Managed by PCP

## 2025-06-05 NOTE — ASSESSMENT & PLAN NOTE
Recent hospitalization because of worsening of respiratory status and was found to have pneumonia.

## 2025-06-12 ENCOUNTER — NURSE TRIAGE (OUTPATIENT)
Dept: CARDIOLOGY CLINIC | Facility: CLINIC | Age: 70
End: 2025-06-12

## 2025-06-12 NOTE — TELEPHONE ENCOUNTER
Patient need refill to ezetimibe 10mg sent to [facility]  Last ordered in hospital    He would like to know if he can take it in the morning instead of at bedtime.  Please call to advise

## 2025-06-13 DIAGNOSIS — E78.5 HYPERLIPIDEMIA: ICD-10-CM

## 2025-06-13 NOTE — TELEPHONE ENCOUNTER
"Additional Information  • Commented on: Answer Assessment     Time to take zetia  • Commented on: Caller has medicine question only, adult not sick, and triager answers question     Inquiring if he can take Zetia in the AM because he keeps forgetting to take at night. Advised he can take Zetia in the AM.    Also needs a refill.    States he also wants to change to a different cardiologist in [facility], as Dr Ba \"is retiring soon\"?    Recent hospitalization in April, has not been seen by cardiology after discharge.    Protocols used: Medication Question Call-Adult-[place]    "

## 2025-06-13 NOTE — TELEPHONE ENCOUNTER
"  Reason for Disposition   Caller has medicine question only, adult not sick, and triager answers question     Inquiring if he can take Zetia in the AM because he keeps forgetting to take at night. Advised he can take Zetia in the AM.    Also needs a refill.    States he also wants to change to a different cardiologist in [facility], as Dr Ba \"is retiring soon\"?    Recent hospitalization in April, has not been seen by cardiology after discharge.    Answer Assessment - Initial Assessment Questions  1. NAME of MEDICINE: \"What medicine(s) are you calling about?\"      zetia  2. QUESTION: \"What is your question?\" (e.g., double dose of medicine, side effect)      Can I take this in the AM with my other meds? I keep forgetting to take it at night. Also need a refill.  3. PRESCRIBER: \"Who prescribed the medicine?\" Reason: if prescribed by specialist, call should be referred to that group.      Christina Johnson PA-C  4. SYMPTOMS: \"Do you have any symptoms?\" If Yes, ask: \"What symptoms are you having?\"  \"How bad are the symptoms (e.g., mild, moderate, severe)      denies     Time to take zetia    Protocols used: Medication Question Call-Adult-[place]    "

## 2025-06-13 NOTE — TELEPHONE ENCOUNTER
Medication: Ezetimibe    Dose/Frequency: 10mg daily    Quantity: 90 R3    Pharm[facility]: [facility]    Office:   [x] PCP/Provider - Christina Johnson PA-C in hospital  [x] Speciality/Provider - asking for Dr Ba to reorder    Does the patient have enough for 3 days?   [x] Yes   [] No - Send as HP to POD

## 2025-06-13 NOTE — TELEPHONE ENCOUNTER
Pt called in to follow up regarding Zeita. Advised that Dr. Ba is reviewing the message. Pt states that he no longer sees Dr. Ba and was not aware that he was calling his office. He then hung up.

## 2025-06-14 DIAGNOSIS — E78.5 HYPERLIPIDEMIA: ICD-10-CM

## 2025-06-14 DIAGNOSIS — J31.0 CHRONIC RHINITIS: ICD-10-CM

## 2025-06-15 RX ORDER — EZETIMIBE 10 MG/1
10 TABLET ORAL
Qty: 30 TABLET | Refills: 0 | Status: SHIPPED | OUTPATIENT
Start: 2025-06-15

## 2025-06-16 RX ORDER — ACETAMINOPHEN 325 MG/1
650 TABLET ORAL ONCE
Status: CANCELLED | OUTPATIENT
Start: 2025-06-17

## 2025-06-16 RX ORDER — SODIUM CHLORIDE 9 MG/ML
20 INJECTION, SOLUTION INTRAVENOUS ONCE
Status: CANCELLED | OUTPATIENT
Start: 2025-06-17

## 2025-06-17 ENCOUNTER — PATIENT MESSAGE (OUTPATIENT)
Dept: FAMILY MEDICINE CLINIC | Facility: CLINIC | Age: 70
End: 2025-06-17

## 2025-06-17 ENCOUNTER — HOSPITAL ENCOUNTER (OUTPATIENT)
Dept: INFUSION CENTER | Facility: CLINIC | Age: 70
Discharge: HOME | End: 2025-06-17
Attending: INTERNAL MEDICINE
Payer: MEDICARE

## 2025-06-17 VITALS
TEMPERATURE: 97.5 F | RESPIRATION RATE: 16 BRPM | SYSTOLIC BLOOD PRESSURE: 158 MMHG | HEART RATE: 71 BPM | DIASTOLIC BLOOD PRESSURE: 92 MMHG

## 2025-06-17 DIAGNOSIS — C91.10 CLL (CHRONIC LYMPHOCYTIC LEUKEMIA) (HCC): Primary | ICD-10-CM

## 2025-06-17 LAB
ALBUMIN SERPL BCG-MCNC: 4.1 G/DL (ref 3.5–5)
ALP SERPL-CCNC: 61 U/L (ref 34–104)
ALT SERPL W P-5'-P-CCNC: 30 U/L (ref 7–52)
ANION GAP SERPL CALCULATED.3IONS-SCNC: 4 MMOL/L (ref 4–13)
AST SERPL W P-5'-P-CCNC: 25 U/L (ref 13–39)
BILIRUB SERPL-MCNC: 0.43 MG/DL (ref 0.2–1)
BUN SERPL-MCNC: 15 MG/DL (ref 5–25)
CALCIUM SERPL-MCNC: 9.5 MG/DL (ref 8.4–10.2)
CHLORIDE SERPL-SCNC: 109 MMOL/L (ref 96–108)
CO2 SERPL-SCNC: 27 MMOL/L (ref 21–32)
CREAT SERPL-MCNC: 0.83 MG/DL (ref 0.6–1.3)
EOSINOPHIL NFR BLD MANUAL: 6 % (ref 0–6)
GFR SERPL CREATININE-BSD FRML MDRD: 89 ML/MIN/1.73SQ M
GLUCOSE SERPL-MCNC: 166 MG/DL (ref 65–140)
HCT VFR BLD AUTO: 43.6 % (ref 36.5–49.3)
HGB BLD-MCNC: 14.4 G/DL (ref 12–17)
IGG SERPL-MCNC: 731 MG/DL (ref 635–1741)
LYMPHOCYTES # BLD AUTO: 35 % (ref 14–44)
MCH RBC QN AUTO: 30.9 PG (ref 26.8–34.3)
MCHC RBC AUTO-ENTMCNC: 33 G/DL (ref 31.4–37.4)
MCV RBC AUTO: 94 FL (ref 82–98)
MONOCYTES NFR BLD: 16 % (ref 4–12)
NEUTS SEG NFR BLD AUTO: 43 % (ref 43–75)
PLATELET # BLD AUTO: 182 THOUSANDS/UL (ref 149–390)
PLATELET BLD QL SMEAR: ADEQUATE
POTASSIUM SERPL-SCNC: 4 MMOL/L (ref 3.5–5.3)
PROT SERPL-MCNC: 6.4 G/DL (ref 6.4–8.4)
RBC # BLD AUTO: 4.66 MILLION/UL (ref 3.88–5.62)
RETICS # AUTO: NORMAL 10*3/UL (ref 14356–105094)
RETICS # CALC: 1.58 % (ref 0.37–1.87)
SODIUM SERPL-SCNC: 140 MMOL/L (ref 135–147)
WBC # BLD AUTO: 4.77 THOUSAND/UL (ref 4.31–10.16)

## 2025-06-17 PROCEDURE — 80053 COMPREHEN METABOLIC PANEL: CPT | Performed by: INTERNAL MEDICINE

## 2025-06-17 PROCEDURE — 96413 CHEMO IV INFUSION 1 HR: CPT

## 2025-06-17 PROCEDURE — 96367 TX/PROPH/DG ADDL SEQ IV INF: CPT

## 2025-06-17 PROCEDURE — 82784 ASSAY IGA/IGD/IGG/IGM EACH: CPT | Performed by: PHYSICIAN ASSISTANT

## 2025-06-17 PROCEDURE — 85027 COMPLETE CBC AUTOMATED: CPT | Performed by: INTERNAL MEDICINE

## 2025-06-17 PROCEDURE — 96415 CHEMO IV INFUSION ADDL HR: CPT

## 2025-06-17 PROCEDURE — 85007 BL SMEAR W/DIFF WBC COUNT: CPT | Performed by: INTERNAL MEDICINE

## 2025-06-17 PROCEDURE — 85045 AUTOMATED RETICULOCYTE COUNT: CPT | Performed by: INTERNAL MEDICINE

## 2025-06-17 RX ORDER — BENZONATATE 200 MG/1
200 CAPSULE ORAL 3 TIMES DAILY PRN
Qty: 21 CAPSULE | Refills: 0 | Status: SHIPPED | OUTPATIENT
Start: 2025-06-17 | End: 2025-06-19 | Stop reason: SDUPTHER

## 2025-06-17 RX ORDER — EZETIMIBE 10 MG/1
10 TABLET ORAL
Qty: 30 TABLET | Refills: 0 | OUTPATIENT
Start: 2025-06-17

## 2025-06-17 RX ORDER — SODIUM CHLORIDE 9 MG/ML
20 INJECTION, SOLUTION INTRAVENOUS ONCE
Status: COMPLETED | OUTPATIENT
Start: 2025-06-17 | End: 2025-06-17

## 2025-06-17 RX ORDER — ACETAMINOPHEN 325 MG/1
650 TABLET ORAL ONCE
Status: COMPLETED | OUTPATIENT
Start: 2025-06-17 | End: 2025-06-17

## 2025-06-17 RX ADMIN — ACETAMINOPHEN 650 MG: 325 TABLET ORAL at 08:19

## 2025-06-17 RX ADMIN — DIPHENHYDRAMINE HYDROCHLORIDE 25 MG: 50 INJECTION INTRAMUSCULAR; INTRAVENOUS at 08:39

## 2025-06-17 RX ADMIN — DEXAMETHASONE SODIUM PHOSPHATE 20 MG: 10 INJECTION, SOLUTION INTRAMUSCULAR; INTRAVENOUS at 08:17

## 2025-06-17 RX ADMIN — SODIUM CHLORIDE 20 ML/HR: 9 INJECTION, SOLUTION INTRAVENOUS at 08:17

## 2025-06-17 RX ADMIN — OBINUTUZUMAB 1000 MG: 1000 INJECTION, SOLUTION, CONCENTRATE INTRAVENOUS at 09:39

## 2025-06-17 NOTE — TELEPHONE ENCOUNTER
Patient called back to the medication refill line and advised yes he needs a refill of the Tessalon Pearls sent to [facility] pharmacy in [place].

## 2025-06-17 NOTE — PROGRESS NOTES
Labs collected via port, OK to draw and proceed. Pt tolerated treatment without incident. Pt declined AVS, aware of next appt 7/1 at 8am for IVIG.

## 2025-06-17 NOTE — TELEPHONE ENCOUNTER
Spoke with patient to confirm whether he send the request for Tessalon or not. Patient stated  he will ask his wife to confirm.

## 2025-06-19 DIAGNOSIS — J31.0 CHRONIC RHINITIS: ICD-10-CM

## 2025-06-19 RX ORDER — BENZONATATE 200 MG/1
200 CAPSULE ORAL 3 TIMES DAILY PRN
Qty: 21 CAPSULE | Refills: 1 | Status: SHIPPED | OUTPATIENT
Start: 2025-06-19

## 2025-06-19 NOTE — PATIENT COMMUNICATION
Spoke with patient. He would like you to call in another week but would like to know what he is supposed to do after that

## 2025-06-29 ENCOUNTER — APPOINTMENT (EMERGENCY)
Dept: CT IMAGING | Facility: HOSPITAL | Age: 70
End: 2025-06-29
Payer: MEDICARE

## 2025-06-29 ENCOUNTER — HOSPITAL ENCOUNTER (EMERGENCY)
Facility: HOSPITAL | Age: 70
Discharge: HOME | End: 2025-06-29
Attending: EMERGENCY MEDICINE | Admitting: EMERGENCY MEDICINE
Payer: MEDICARE

## 2025-06-29 VITALS
TEMPERATURE: 98.7 F | RESPIRATION RATE: 18 BRPM | HEART RATE: 77 BPM | OXYGEN SATURATION: 98 % | SYSTOLIC BLOOD PRESSURE: 156 MMHG | DIASTOLIC BLOOD PRESSURE: 90 MMHG

## 2025-06-29 DIAGNOSIS — R07.9 CHEST PAIN: Primary | ICD-10-CM

## 2025-06-29 LAB
2HR DELTA HS TROPONIN: 0 NG/L
ALBUMIN SERPL BCG-MCNC: 3.9 G/DL (ref 3.5–5)
ALP SERPL-CCNC: 55 U/L (ref 34–104)
ALT SERPL W P-5'-P-CCNC: 44 U/L (ref 7–52)
ANION GAP SERPL CALCULATED.3IONS-SCNC: 6 MMOL/L (ref 4–13)
AST SERPL W P-5'-P-CCNC: 31 U/L (ref 13–39)
ATRIAL RATE: 74 BPM
ATRIAL RATE: 75 BPM
BASOPHILS # BLD MANUAL: 0 THOUSAND/UL (ref 0–0.1)
BASOPHILS NFR MAR MANUAL: 0 % (ref 0–1)
BILIRUB DIRECT SERPL-MCNC: 0.16 MG/DL (ref 0–0.2)
BILIRUB SERPL-MCNC: 0.79 MG/DL (ref 0.2–1)
BUN SERPL-MCNC: 14 MG/DL (ref 5–25)
CALCIUM SERPL-MCNC: 9.3 MG/DL (ref 8.4–10.2)
CARDIAC TROPONIN I PNL SERPL HS: 5 NG/L (ref ?–50)
CARDIAC TROPONIN I PNL SERPL HS: 5 NG/L (ref ?–50)
CHLORIDE SERPL-SCNC: 106 MMOL/L (ref 96–108)
CO2 SERPL-SCNC: 28 MMOL/L (ref 21–32)
CREAT SERPL-MCNC: 0.87 MG/DL (ref 0.6–1.3)
EOSINOPHIL # BLD MANUAL: 0.28 THOUSAND/UL (ref 0–0.4)
EOSINOPHIL NFR BLD MANUAL: 4 % (ref 0–6)
ERYTHROCYTE [DISTWIDTH] IN BLOOD BY AUTOMATED COUNT: 14 % (ref 11.6–15.1)
GFR SERPL CREATININE-BSD FRML MDRD: 87 ML/MIN/1.73SQ M
GLUCOSE SERPL-MCNC: 148 MG/DL (ref 65–140)
HCT VFR BLD AUTO: 41.3 % (ref 36.5–49.3)
HGB BLD-MCNC: 14.2 G/DL (ref 12–17)
LG PLATELETS BLD QL SMEAR: PRESENT
LIPASE SERPL-CCNC: 41 U/L (ref 11–82)
LYMPHOCYTES # BLD AUTO: 0.78 THOUSAND/UL (ref 0.6–4.47)
LYMPHOCYTES # BLD AUTO: 9 % (ref 14–44)
MCH RBC QN AUTO: 30.5 PG (ref 26.8–34.3)
MCHC RBC AUTO-ENTMCNC: 34.4 G/DL (ref 31.4–37.4)
MCV RBC AUTO: 89 FL (ref 82–98)
METAMYELOCYTE ABSOLUTE CT: 0.07 THOUSAND/UL (ref 0–0.1)
METAMYELOCYTES NFR BLD MANUAL: 1 % (ref 0–1)
MONOCYTES # BLD AUTO: 0.78 THOUSAND/UL (ref 0–1.22)
MONOCYTES NFR BLD: 11 % (ref 4–12)
MYELOCYTE ABSOLUTE CT: 0.07 THOUSAND/UL (ref 0–0.1)
MYELOCYTES NFR BLD MANUAL: 1 % (ref 0–1)
NEUTROPHILS # BLD MANUAL: 5.1 THOUSAND/UL (ref 1.85–7.62)
NEUTS BAND NFR BLD MANUAL: 4 % (ref 0–8)
NEUTS SEG NFR BLD AUTO: 68 % (ref 43–75)
OVALOCYTES BLD QL SMEAR: PRESENT
P AXIS: 27 DEGREES
P AXIS: 33 DEGREES
PLATELET # BLD AUTO: 130 THOUSANDS/UL (ref 149–390)
PLATELET BLD QL SMEAR: ABNORMAL
PMV BLD AUTO: 11.5 FL (ref 8.9–12.7)
POTASSIUM SERPL-SCNC: 4 MMOL/L (ref 3.5–5.3)
PR INTERVAL: 150 MS
PR INTERVAL: 162 MS
PROT SERPL-MCNC: 6.1 G/DL (ref 6.4–8.4)
QRS AXIS: -26 DEGREES
QRS AXIS: -28 DEGREES
QRSD INTERVAL: 86 MS
QRSD INTERVAL: 90 MS
QT INTERVAL: 392 MS
QT INTERVAL: 402 MS
QTC INTERVAL: 435 MS
QTC INTERVAL: 448 MS
RBC # BLD AUTO: 4.66 MILLION/UL (ref 3.88–5.62)
RBC MORPH BLD: PRESENT
SODIUM SERPL-SCNC: 140 MMOL/L (ref 135–147)
T WAVE AXIS: 21 DEGREES
T WAVE AXIS: 36 DEGREES
VARIANT LYMPHS # BLD AUTO: 2 %
VENTRICULAR RATE: 74 BPM
VENTRICULAR RATE: 75 BPM
WBC # BLD AUTO: 7.08 THOUSAND/UL (ref 4.31–10.16)

## 2025-06-29 PROCEDURE — 85027 COMPLETE CBC AUTOMATED: CPT | Performed by: EMERGENCY MEDICINE

## 2025-06-29 PROCEDURE — 99285 EMERGENCY DEPT VISIT HI MDM: CPT | Performed by: EMERGENCY MEDICINE

## 2025-06-29 PROCEDURE — 93010 ELECTROCARDIOGRAM REPORT: CPT | Performed by: INTERNAL MEDICINE

## 2025-06-29 PROCEDURE — 80076 HEPATIC FUNCTION PANEL: CPT | Performed by: EMERGENCY MEDICINE

## 2025-06-29 PROCEDURE — 80048 BASIC METABOLIC PNL TOTAL CA: CPT | Performed by: EMERGENCY MEDICINE

## 2025-06-29 PROCEDURE — 85007 BL SMEAR W/DIFF WBC COUNT: CPT | Performed by: EMERGENCY MEDICINE

## 2025-06-29 PROCEDURE — 96365 THER/PROPH/DIAG IV INF INIT: CPT

## 2025-06-29 PROCEDURE — 71250 CT THORAX DX C-: CPT

## 2025-06-29 PROCEDURE — 93010 ELECTROCARDIOGRAM REPORT: CPT

## 2025-06-29 PROCEDURE — 96366 THER/PROPH/DIAG IV INF ADDON: CPT

## 2025-06-29 PROCEDURE — 83690 ASSAY OF LIPASE: CPT | Performed by: EMERGENCY MEDICINE

## 2025-06-29 PROCEDURE — 93005 ELECTROCARDIOGRAM TRACING: CPT

## 2025-06-29 PROCEDURE — 99285 EMERGENCY DEPT VISIT HI MDM: CPT

## 2025-06-29 PROCEDURE — 36415 COLL VENOUS BLD VENIPUNCTURE: CPT | Performed by: EMERGENCY MEDICINE

## 2025-06-29 PROCEDURE — 84484 ASSAY OF TROPONIN QUANT: CPT | Performed by: EMERGENCY MEDICINE

## 2025-06-29 RX ORDER — ACETAMINOPHEN 10 MG/ML
1000 INJECTION, SOLUTION INTRAVENOUS ONCE
Status: COMPLETED | OUTPATIENT
Start: 2025-06-29 | End: 2025-06-29

## 2025-06-29 RX ORDER — FAMOTIDINE 20 MG/1
20 TABLET, FILM COATED ORAL 2 TIMES DAILY
Qty: 30 TABLET | Refills: 0 | Status: SHIPPED | OUTPATIENT
Start: 2025-06-29

## 2025-06-29 RX ADMIN — ACETAMINOPHEN 1000 MG: 10 INJECTION INTRAVENOUS at 07:37

## 2025-06-29 RX ADMIN — SODIUM CHLORIDE 500 ML: 0.9 INJECTION, SOLUTION INTRAVENOUS at 07:37

## 2025-06-29 NOTE — ED PROVIDER NOTES
Time reflects when diagnosis was documented in both MDM as applicable and the Disposition within this note       Time User Action Codes Description Comment    6/29/2025 10:33 AM Rico Hebert Add [R07.9] Chest pain           ED Disposition       ED Disposition   Discharge    Condition   Stable    Date/Time   Sun Jun 29, 2025 10:33 AM    Comment   Cayetano Lucero discharge to home/self care.                   Assessment & Plan       Medical Decision Making  1. Chest pain - Will order ECG and troponin to rule out acute MI, CBC for leukocytosis and anemia, metabolic panel for electrolyte abnormalities and dehydration, will order CT of chest as he has Hx of ILD and recent multifocal pneumonia. Will give IV fluids, start with acetaminophen for analgesia.    Problems Addressed:  Chest pain: undiagnosed new problem with uncertain prognosis    Amount and/or Complexity of Data Reviewed  External Data Reviewed: labs and notes.  Labs: ordered. Decision-making details documented in ED Course.  Radiology: ordered.  ECG/medicine tests: ordered and independent interpretation performed. Decision-making details documented in ED Course.    Risk  Prescription drug management.        ED Course as of 06/29/25 1551   Sun Jun 29, 2025   0903 hs TnI 0hr: 5  In indeterminate range, will check two hour level.      0904 ECG evaluated by myself, interpretation included in procedure section of note.        Medications   acetaminophen (Ofirmev) injection 1,000 mg (0 mg Intravenous Stopped 6/29/25 0942)   sodium chloride 0.9 % bolus 500 mL (0 mL Intravenous Stopped 6/29/25 0942)       ED Risk Strat Scores   HEART Risk Score      Flowsheet Row Most Recent Value   Heart Score Risk Calculator    History 0 Filed at: 06/29/2025 1033   ECG 0 Filed at: 06/29/2025 1033   Age 2 Filed at: 06/29/2025 1033   Risk Factors 2 Filed at: 06/29/2025 1033   Troponin 0 Filed at: 06/29/2025 1033   HEART Score 4 Filed at: 06/29/2025 1033          HEART Risk Score       Flowsheet Row Most Recent Value   Heart Score Risk Calculator    History 0 Filed at: 06/29/2025 1033   ECG 0 Filed at: 06/29/2025 1033   Age 2 Filed at: 06/29/2025 1033   Risk Factors 2 Filed at: 06/29/2025 1033   Troponin 0 Filed at: 06/29/2025 1033   HEART Score 4 Filed at: 06/29/2025 1033                      No data recorded        SBIRT 20yo+      Flowsheet Row Most Recent Value   Initial Alcohol Screen: US AUDIT-C     1. How often do you have a drink containing alcohol? 1 Filed at: 06/29/2025 0715   2. How many drinks containing alcohol do you have on a typical day you are drinking?  1 Filed at: 06/29/2025 0715   3a. Male UNDER 65: How often do you have five or more drinks on one occasion? 0 Filed at: 06/29/2025 0715   3b. FEMALE Any Age, or MALE 65+: How often do you have 4 or more drinks on one occassion? 0 Filed at: 06/29/2025 0715   Audit-C Score 2 Filed at: 06/29/2025 0715   BRANDI: How many times in the past year have you...    Used an illegal drug or used a prescription medication for non-medical reasons? Never Filed at: 06/29/2025 0715                            History of Present Illness       Chief Complaint   Patient presents with    Chest Pain     Patient arrives via EMS with c/o chest pain beginning about 12 hours ago. Patient describes the pain as midsternal pressure and heaviness. Hx MI in 2020.        Past Medical History[1]   Past Surgical History[2]   Family History[3]   Social History[4]   E-Cigarette/Vaping    E-Cigarette Use Never User       E-Cigarette/Vaping Substances    Nicotine No     THC No     CBD No     Flavoring No     Other No     Unknown No       I have reviewed and agree with the history as documented.     70 YO male with Hx of CLL, ILD, previous MI, presents with chest pain. Patient states he was feeling well yesterday, active. Overnight he developed some some mild discomfort at the Xiphoid. States this was intermittent, seemed to be worse with some movements. He denies  similar in the past. Patient notes he had gotten up this morning and, one packing a cooler for work, the pain did intensify, associated with lightheadedness. Patient called EMS. He denies associated nausea or vomiting. States this is not similar to his previous MI and the discomfort has improved but he does notice it mildly with deep breathing. He states this is not similar to his previous MI.  Pt denies CP/SOB/F/C/N/V/D/C, no dysuria, burning on urination or blood in urine.       History provided by:  Patient   used: No        Review of Systems   Constitutional:  Negative for fever.   HENT:  Negative for dental problem.    Eyes:  Negative for visual disturbance.   Respiratory:  Negative for shortness of breath.    Cardiovascular:  Positive for chest pain.   Gastrointestinal:  Negative for abdominal pain, nausea and vomiting.   Genitourinary:  Negative for dysuria and frequency.   Musculoskeletal:  Negative for neck pain and neck stiffness.   Skin:  Negative for rash.   Neurological:  Positive for light-headedness. Negative for dizziness and weakness.   Psychiatric/Behavioral:  Negative for agitation, behavioral problems and confusion.    All other systems reviewed and are negative.          Objective       ED Triage Vitals   Temperature Pulse Blood Pressure Respirations SpO2 Patient Position - Orthostatic VS   06/29/25 0712 06/29/25 0711 06/29/25 0711 06/29/25 0711 06/29/25 0711 06/29/25 0711   98.7 °F (37.1 °C) 74 (!) 182/80 18 99 % Lying      Temp Source Heart Rate Source BP Location FiO2 (%) Pain Score    06/29/25 0712 06/29/25 0711 06/29/25 0711 -- 06/29/25 0711    Oral Monitor Right arm  4      Vitals      Date and Time Temp Pulse SpO2 Resp BP Pain Score FACES Pain Rating User   06/29/25 1030 -- 77 98 % 18 156/90 -- -- EK   06/29/25 0930 -- 75 96 % 19 161/91 -- -- EK   06/29/25 0900 -- 79 97 % 18 149/81 -- -- EK   06/29/25 0833 -- 77 -- 18 167/79 1 -- KE   06/29/25 0712 98.7 °F (37.1 °C) --  -- -- -- -- -- EK   06/29/25 0711 -- 74 99 % 18 182/80 4 -- EK            Physical Exam  Vitals and nursing note reviewed.   Constitutional:       Appearance: He is well-developed.   HENT:      Head: Normocephalic and atraumatic.     Eyes:      Extraocular Movements: Extraocular movements intact.       Cardiovascular:      Rate and Rhythm: Normal rate.   Pulmonary:      Effort: Pulmonary effort is normal.   Chest:      Chest wall: Tenderness (Palpation over xiphoid does reproduce discomfort, very mild) present.   Abdominal:      General: There is no distension.     Musculoskeletal:         General: Normal range of motion.      Cervical back: Normal range of motion.     Skin:     Findings: No rash.     Neurological:      Mental Status: He is alert and oriented to person, place, and time.     Psychiatric:         Behavior: Behavior normal.         Results Reviewed       Procedure Component Value Units Date/Time    Manual Differential(PHLEBS Do Not Order) [[id number]]  (Abnormal) Collected: 06/29/25 0733    Lab Status: Final result Specimen: Blood from Arm, Left Updated: 06/29/25 1053     Segmented % 68 %      Bands % 4 %      Lymphocytes % 9 %      Monocytes % 11 %      Eosinophils % 4 %      Basophils % 0 %      Metamyelocytes % 1 %      Myelocytes % 1 %      Atypical Lymphocytes % 2 %      Absolute Neutrophils 5.10 Thousand/uL      Absolute Lymphocytes 0.78 Thousand/uL      Absolute Monocytes 0.78 Thousand/uL      Absolute Eosinophils 0.28 Thousand/uL      Absolute Basophils 0.00 Thousand/uL      Absolute Metamyelocytes 0.07 Thousand/uL      Absolute Myelocytes 0.07 Thousand/uL      Total Counted --     RBC Morphology Present     Platelet Estimate Borderline     Large Platelet Present     Ovalocytes Present    HS Troponin I 2hr [[id number]]  (Normal) Collected: 06/29/25 0942    Lab Status: Final result Specimen: Blood from Arm, Left Updated: 06/29/25 1015     hs TnI 2hr 5 ng/L      Delta 2hr hsTnI 0 ng/L     HS  Troponin 0hr (reflex protocol) [[id number]]  (Normal) Collected: 06/29/25 0733    Lab Status: Final result Specimen: Blood from Arm, Left Updated: 06/29/25 0857     hs TnI 0hr 5 ng/L     Basic metabolic panel [[id number]]  (Abnormal) Collected: 06/29/25 0733    Lab Status: Final result Specimen: Blood from Arm, Left Updated: 06/29/25 0832     Sodium 140 mmol/L      Potassium 4.0 mmol/L      Chloride 106 mmol/L      CO2 28 mmol/L      ANION GAP 6 mmol/L      BUN 14 mg/dL      Creatinine 0.87 mg/dL      Glucose 148 mg/dL      Calcium 9.3 mg/dL      eGFR 87 ml/min/1.73sq m     Narrative:      National Kid[employer] Foundation guidelines for Chronic Kidney Disease (CKD):     Stage 1 with normal or high GFR (GFR > 90 mL/min/1.73 square meters)    Stage 2 Mild CKD (GFR = 60-89 mL/min/1.73 square meters)    Stage 3A Moderate CKD (GFR = 45-59 mL/min/1.73 square meters)    Stage 3B Moderate CKD (GFR = 30-44 mL/min/1.73 square meters)    Stage 4 Severe CKD (GFR = 15-29 mL/min/1.73 square meters)    Stage 5 End Stage CKD (GFR <15 mL/min/1.73 square meters)  Note: GFR calculation is accurate only with a steady state creatinine    Hepatic function panel [[id number]]  (Abnormal) Collected: 06/29/25 0733    Lab Status: Final result Specimen: Blood from Arm, Left Updated: 06/29/25 0832     Total Bilirubin 0.79 mg/dL      Bilirubin, Direct 0.16 mg/dL      Alkaline Phosphatase 55 U/L      AST 31 U/L      ALT 44 U/L      Total Protein 6.1 g/dL      Albumin 3.9 g/dL     Lipase [[id number]]  (Normal) Collected: 06/29/25 0733    Lab Status: Final result Specimen: Blood from Arm, Left Updated: 06/29/25 0827     Lipase 41 u/L     CBC and differential [[id number]]  (Abnormal) Collected: 06/29/25 0733    Lab Status: Final result Specimen: Blood from Arm, Left Updated: 06/29/25 0821     WBC 7.08 Thousand/uL      RBC 4.66 Million/uL      Hemoglobin 14.2 g/dL      Hematocrit 41.3 %      MCV 89 fL      MCH 30.5 pg      MCHC 34.4 g/dL      RDW 14.0 %       MPV 11.5 fL      Platelets 130 Thousands/uL             CT chest without contrast   Final Interpretation by Zachary Escalona MD (06/29 0817)      Interval improvement in the appearance of the lungs with resolution of prior airspace opacities.   Persistent 6 mm left upper lobe groundglass nodule, increased from 4 mm in 2022. This may represent an adenocarcinoma spectrum lesion. Consider 1 year follow-up CT scan.         Computerized Assisted Algorithm (CAA) may have aided analysis of applicable images.      Workstation performed: [id number]             ECG 12 Lead Documentation Only    Date/Time: 6/29/2025 7:32 AM    Performed by: Rico Hebert MD  Authorized by: Rico Hebert MD    ECG reviewed by me, the ED Provider: yes    Patient location:  ED  Previous ECG:     Previous ECG:  Compared to current    Comparison ECG info:  4/9/2025    Similarity:  Changes noted  Interpretation:     Interpretation: normal    Rate:     ECG rate:  74    ECG rate assessment: normal    Rhythm:     Rhythm: sinus rhythm    QRS:     QRS axis:  Normal    QRS intervals:  Normal  Conduction:     Conduction: normal    ST segments:     ST segments:  Normal  T waves:     T waves: normal        ED Medication and Procedure Management   Prior to Admission Medications   Prescriptions Last Dose Informant Patient Reported? Taking?   Blood Glucose Monitoring Suppl (FreeStyle Freedom Lite) w/Device KIT  Self No No   Sig: Use 3 (three) times a day   Continuous Blood Gluc  (FreeStyle Ashley 2 [place]) JOE  Self No No   Sig: Use to scan sensor premeals and at bedtime   Continuous Glucose Sensor (FreeStyle Ashley 2 Sensor) MISC   No No   Sig: Apply 1 sensor every 14 days. Use as directed with Freestyle Ashley 2 reader.   Diclofenac Sodium (VOLTAREN) 1 %  Self No No   Sig: Apply 4 g topically 4 (four) times a day   Ibrutinib 140 MG CAPS 6/28/2025 Self No Yes   Sig: Take 1 capsule (140 mg total) by mouth daily   Injection Device for  Insulin (CeQur Simplicity 2U) JOE  Self No No   Sig: Use 1 patch every 3 days, disp 1 box of 10 patches for 30 days supply   Injection Device for Insulin (CeQur Simplicity ) MISC  Self No No   Sig: Use to insert simplicity device.   Insulin Pen Needle (BD Pen Needle Inez U/F) 32G X 4 MM MISC  Self No No   Sig: Use 3 (three) times a day   LORazepam (ATIVAN) 0.5 mg tablet  Self No No   Sig: Take 1 tablet (0.5 mg total) by mouth daily as needed for anxiety   Lancets (FREESTYLE) lancets  Self No No   Sig: Use as instructed--test 2 times a day    E 11.9   Lancets (freestyle) lancets  Self No No   Sig: TEST BLOOD SUGAR 3 TIMES A DAY   NovoLOG FlexPen 100 units/mL injection pen 6/28/2025  No Yes   Sig: Inject 14-18 units TID before meals. Max dose of 54 units daily.   Vonoprazan Fumarate 20 MG TABS 6/28/2025 Self No Yes   Sig: Take 20 mg by mouth in the morning   acyclovir (ZOVIRAX) 400 MG tablet  Self No No   Sig: TAKE 1 TABLET TWICE A DAY   albuterol (2.5 mg/3 mL) 0.083 % nebulizer solution Not Taking Self No No   Sig: Take 3 mL (2.5 mg total) by nebulization every 6 (six) hours as needed for wheezing or shortness of breath   Patient not taking: Reported on 6/29/2025   albuterol (Ventolin HFA) 90 mcg/act inhaler Not Taking Self No No   Sig: USE 2 INHALATIONS EVERY 6 HOURS AS NEEDED FOR WHEEZING   Patient not taking: Reported on 6/29/2025   amLODIPine (NORVASC) 10 mg tablet 6/28/2025 Self No Yes   Sig: TAKE 1 TABLET DAILY   aspirin 81 MG tablet 6/29/2025 Morning Self Yes Yes   Sig: Take 81 mg by mouth in the morning. Every other day.   benzonatate (TESSALON) 200 MG capsule   No No   Sig: Take 1 capsule (200 mg total) by mouth 3 (three) times a day as needed for cough   citalopram (CeleXA) 40 mg tablet 6/28/2025 Self No Yes   Sig: TAKE 1 TABLET DAILY   ezetimibe (ZETIA) 10 mg tablet 6/28/2025 Bedtime  No Yes   Sig: Take 1 tablet (10 mg total) by mouth daily at bedtime   folic acid (FOLVITE) 1 mg tablet 6/28/2025  Self Yes Yes   Sig: Take 800 mcg by mouth in the morning.   gabapentin (NEURONTIN) 600 MG tablet 6/28/2025 Self No Yes   Sig: TAKE 1 TABLET DAILY   glucose blood (FREESTYLE LITE) test strip  Self No No   Sig: Check blood sugar 3 times a day   hydroCHLOROthiazide 12.5 mg tablet Not Taking Self No No   Sig: Take 1 tablet (12.5 mg total) by mouth daily Do not start before April 14, 2025.   Patient not taking: Reported on 6/29/2025   insulin degludec (Tresiba FlexTouch) 100 units/mL injection pen   No No   Sig: Inject 14 units Sub-Q daily as directed   losartan (COZAAR) 100 MG tablet 6/28/2025 Self No Yes   Sig: TAKE 1 TABLET DAILY   mometasone-formoterol (Dulera) 200-5 MCG/ACT inhaler 6/28/2025 Self No Yes   Sig: Inhale 2 puffs 2 (two) times a day Rinse mouth after use.   multivitamin (THERAGRAN) TABS 6/28/2025 Self Yes Yes   Sig: Take 1 tablet by mouth in the morning.   naloxone (NARCAN) 4 mg/0.1 mL nasal spray  Self No No   Sig: Administer 1 spray into a nostril. If no response after 2-3 minutes, give another dose in the other nostril using a new spray.   obinutuzumab (GAZYVA) 1000 mg/40 mL SOLN Past Month Self Yes Yes   Sig: Inject into a catheter in a vein   oxyCODONE (ROXICODONE) 10 MG TABS  Self No No   Sig: Take 1 tablet (10 mg total) by mouth every 6 (six) hours as needed for moderate pain Max Daily Amount: 40 mg   predniSONE 5 mg tablet 6/28/2025 Self No Yes   Sig: Take 1 tablet (5 mg total) by mouth daily      Facility-Administered Medications: None     Discharge Medication List as of 6/29/2025 10:35 AM        START taking these medications    Details   famotidine (PEPCID) 20 mg tablet Take 1 tablet (20 mg total) by mouth 2 (two) times a day, Starting Sun 6/29/2025, Normal           CONTINUE these medications which have NOT CHANGED    Details   amLODIPine (NORVASC) 10 mg tablet TAKE 1 TABLET DAILY, Starting Wed 11/13/2024, Normal      aspirin 81 MG tablet Take 81 mg by mouth in the morning. Every other day.,  Historical Med      citalopram (CeleXA) 40 mg tablet TAKE 1 TABLET DAILY, Starting Wed 4/23/2025, Normal      ezetimibe (ZETIA) 10 mg tablet Take 1 tablet (10 mg total) by mouth daily at bedtime, Starting Sun 6/15/2025, Normal      folic acid (FOLVITE) 1 mg tablet Take 800 mcg by mouth in the morning., Historical Med      gabapentin (NEURONTIN) 600 MG tablet TAKE 1 TABLET DAILY, Starting Tue 1/21/2025, Normal      Ibrutinib 140 MG CAPS Take 1 capsule (140 mg total) by mouth daily, Starting Thu 3/13/2025, Normal      losartan (COZAAR) 100 MG tablet TAKE 1 TABLET DAILY, Normal      mometasone-formoterol (Dulera) 200-5 MCG/ACT inhaler Inhale 2 puffs 2 (two) times a day Rinse mouth after use., Starting Fri 2/25/2022, Normal      multivitamin (THERAGRAN) TABS Take 1 tablet by mouth in the morning., Historical Med      NovoLOG FlexPen 100 units/mL injection pen Inject 14-18 units TID before meals. Max dose of 54 units daily., Fill Later      obinutuzumab (GAZYVA) 1000 mg/40 mL SOLN Inject into a catheter in a vein, Historical Med      predniSONE 5 mg tablet Take 1 tablet (5 mg total) by mouth daily, Starting Mon 3/24/2025, Normal      Vonoprazan Fumarate 20 MG TABS Take 20 mg by mouth in the morning, Starting Wed 4/24/2024, Normal      acyclovir (ZOVIRAX) 400 MG tablet TAKE 1 TABLET TWICE A DAY, Normal      albuterol (2.5 mg/3 mL) 0.083 % nebulizer solution Take 3 mL (2.5 mg total) by nebulization every 6 (six) hours as needed for wheezing or shortness of breath, Starting Thu 8/11/2022, Normal      albuterol (Ventolin HFA) 90 mcg/act inhaler USE 2 INHALATIONS EVERY 6 HOURS AS NEEDED FOR WHEEZING, Starting Wed 9/11/2024, Normal      benzonatate (TESSALON) 200 MG capsule Take 1 capsule (200 mg total) by mouth 3 (three) times a day as needed for cough, Starting Thu 6/19/2025, Normal      Blood Glucose Monitoring Suppl (FreeStyle Freedom Lite) w/Device KIT Use 3 (three) times a day, Starting Tue 8/1/2023, Normal       Continuous Blood Gluc  (FreeStyle Ashley 2 [place]) JOE Use to scan sensor premeals and at bedtime, Normal      Continuous Glucose Sensor (FreeStyle Ashley 2 Sensor) MISC Apply 1 sensor every 14 days. Use as directed with Freestyle Ashley 2 reader., Normal      Diclofenac Sodium (VOLTAREN) 1 % Apply 4 g topically 4 (four) times a day, Starting Fri 6/9/2023, Normal      glucose blood (FREESTYLE LITE) test strip Check blood sugar 3 times a day, Normal      hydroCHLOROthiazide 12.5 mg tablet Take 1 tablet (12.5 mg total) by mouth daily Do not start before April 14, 2025., Starting Mon 4/14/2025, Normal      Injection Device for Insulin (CeQur Simplicity 2U) JOE Use 1 patch every 3 days, disp 1 box of 10 patches for 30 days supply, Normal      Injection Device for Insulin (CeQur Simplicity ) MISC Use to insert simplicity device., Normal      insulin degludec (Tresiba FlexTouch) 100 units/mL injection pen Inject 14 units Sub-Q daily as directed, Fill Later      Insulin Pen Needle (BD Pen Needle Inez U/F) 32G X 4 MM MISC Use 3 (three) times a day, Starting Thu 12/12/2024, Normal      !! Lancets (FREESTYLE) lancets Use as instructed--test 2 times a day    E 11.9, Print      !! Lancets (freestyle) lancets TEST BLOOD SUGAR 3 TIMES A DAY, Normal      LORazepam (ATIVAN) 0.5 mg tablet Take 1 tablet (0.5 mg total) by mouth daily as needed for anxiety, Starting Thu 10/3/2024, Normal      naloxone (NARCAN) 4 mg/0.1 mL nasal spray Administer 1 spray into a nostril. If no response after 2-3 minutes, give another dose in the other nostril using a new spray., Normal      oxyCODONE (ROXICODONE) 10 MG TABS Take 1 tablet (10 mg total) by mouth every 6 (six) hours as needed for moderate pain Max Daily Amount: 40 mg, Starting Wed 11/20/2024, Normal       !! - Potential duplicate medications found. Please discuss with provider.          ED SEPSIS DOCUMENTATION   Time reflects when diagnosis was documented in both MDM as  applicable and the Disposition within this note       Time User Action Codes Description Comment    6/29/2025 10:33 AM Rico Hebert Add [R07.9] Chest pain                      [1]   Past Medical History:  Diagnosis Date    Allergic     Anemia     Arthritis     Asthmatic bronchitis 6/4/2025    CAD (coronary artery disease) 2004    Cancer (HCC)     Leukemia    Cellulitis of scalp     CKD (chronic kidney disease) stage 3, GFR 30-59 ml/min (HCC)     CLL (chronic lymphocytic leukemia) (HCC)     COPD (chronic obstructive pulmonary disease) (HCC)     Diabetes mellitus (HCC)     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    Dyslipidemia     Edema extremities     Esophageal reflux 01/31/2018    Added automatically from request for surgery [id number]    Esophageal ulcer     H/O blood clots     Hemolytic anemia (HCC)     Hiatal hernia     History of TIA (transient ischemic attack)     Hyperlipidemia     Hypertension     Listeria infection 2018    Multiple gastric ulcers     Myocardial infarction (HCC) 2004    acute    Neutropenia (HCC)     Obese     Pneumonia     Positive QuantiFERON-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 08/21/2017    Recurrent sinusitis     Thrombocytopenia (HCC)     Vertigo    [2]   Past Surgical History:  Procedure Laterality Date    ARTHROSCOPIC REPAIR ACL      lt knee    CARDIAC SURGERY      cardiac cath with stent    FOOT SURGERY      IR PORT CHECK  5/17/2019    KNEE ARTHROSCOPY      OTHER SURGICAL HISTORY      cardiac cath lesion 1, 1st adjunct treat device: stent    PORTACATH PLACEMENT      FL ESOPHAGOGASTRODUODENOSCOPY TRANSORAL DIAGNOSTIC N/A 10/5/2017    Procedure: ESOPHAGOGASTRODUODENOSCOPY (EGD) with bx;  Surgeon: Winifred Hansen DO;  Location: [facility];  Service: Gastroenterology    FL ESOPHAGOGASTRODUODENOSCOPY TRANSORAL DIAGNOSTIC N/A 3/8/2018    Procedure: ESOPHAGOGASTRODUODENOSCOPY (EGD) with biopsy;  Surgeon: Winifred Hansen DO;  Location: [facility];  Service: Gastroenterology    FL ESOPHAGOGASTRODUODENOSCOPY TRANSORAL DIAGNOSTIC N/A  2018    Procedure: ESOPHAGOGASTRODUODENOSCOPY (EGD) with Dilation;  Surgeon: Winifred Hansen DO;  Location: [facility];  Service: Gastroenterology    NV ESOPHAGOGASTRODUODENOSCOPY TRANSORAL DIAGNOSTIC N/A 10/18/2018    Procedure: ESOPHAGOGASTRODUODENOSCOPY (EGD) with dilation;  Surgeon: Edu Mejía MD;  Location: [facility];  Service: Gastroenterology    NV ESOPHAGOGASTRODUODENOSCOPY TRANSORAL DIAGNOSTIC N/A 2024    Procedure: ESOPHAGOGASTRODUODENOSCOPY (EGD);  Surgeon: Immanuel Gonsales MD;  Location: [facility];  Service: Thoracic    NV ESOPHAGOSCOPY FLEXIBLE TRANSORAL WITH BIOPSY N/A 2016    Procedure: ESOPHAGOGASTRODUODENOSCOPY (EGD); ESOPHAGEAL DILATION; ESOPHAGEAL BIOPSY;  Surgeon: Abdi Holcomb MD;  Location: [facility];  Service: Thoracic    NV LAPS RPR PARAESPHGL HRNA INCL FUNDPLSTY W/O MESH N/A 2024    Procedure: REPAIR HERNIA PARAESOPHAGEAL LAPAROSCOPIC W ROBOTICS TYPE 3. PARTIAL FUNDOPLICATION. MESH.;  Surgeon: Immanuel Gonsales MD;  Location: [facility];  Service: Thoracic    TONSILLECTOMY      UPPER GASTROINTESTINAL ENDOSCOPY      x 6   [3]   Family History  Problem Relation Name Age of Onset    Leukemia Mother          chronic    Colon polyps Mother          sidmoid    Parkinsonism Father     [4]   Social History  Tobacco Use    Smoking status: Former     Current packs/day: 0.00     Average packs/day: 2.0 packs/day for 33.0 years (66.0 ttl pk-yrs)     Types: Cigarettes     Start date:      Quit date:      Years since quittin.5     Passive exposure: Past    Smokeless tobacco: Never   Vaping Use    Vaping status: Never Used   Substance Use Topics    Alcohol use: Yes     Alcohol/week: 2.0 standard drinks of alcohol     Types: 2 Cans of beer per week     Comment: social use as per [employer]    Drug use: Never        Rico Hebert MD  [phone]

## 2025-06-29 NOTE — DISCHARGE INSTRUCTIONS
Return to the ER if your chest pain changes in quality or location, or becomes associated with shortness of breath, lightheadedness, vomiting or sweating.    Take the Pepcid twice daily.    The number for cardiology is included in these discharge instructions, call to be seen in the office for further evaluation and management.

## 2025-06-30 ENCOUNTER — VBI (OUTPATIENT)
Dept: FAMILY MEDICINE CLINIC | Facility: CLINIC | Age: 70
End: 2025-06-30

## 2025-06-30 NOTE — TELEPHONE ENCOUNTER
06/30/25 1:24 PM    Patient contacted post ED visit, [facility] department spoke with patient/caregiver and outreach was successful.    Thank you.  Verona Gunter MA  [facility]

## 2025-07-01 ENCOUNTER — HOSPITAL ENCOUNTER (OUTPATIENT)
Dept: INFUSION CENTER | Facility: CLINIC | Age: 70
Discharge: HOME | End: 2025-07-01
Attending: INTERNAL MEDICINE
Payer: MEDICARE

## 2025-07-01 VITALS
HEART RATE: 73 BPM | TEMPERATURE: 97 F | RESPIRATION RATE: 16 BRPM | SYSTOLIC BLOOD PRESSURE: 145 MMHG | DIASTOLIC BLOOD PRESSURE: 87 MMHG

## 2025-07-01 DIAGNOSIS — D59.19 OTHER AUTOIMMUNE HEMOLYTIC ANEMIA (HCC): ICD-10-CM

## 2025-07-01 DIAGNOSIS — D80.1 HYPOGAMMAGLOBULINEMIA, ACQUIRED (HCC): ICD-10-CM

## 2025-07-01 DIAGNOSIS — C91.10 CLL (CHRONIC LYMPHOCYTIC LEUKEMIA) (HCC): ICD-10-CM

## 2025-07-01 DIAGNOSIS — R76.8 LOW SERUM IGG FOR AGE: Primary | ICD-10-CM

## 2025-07-01 LAB
IGG SERPL-MCNC: 576 MG/DL (ref 635–1741)
RETICS # AUTO: NORMAL 10*3/UL (ref 14356–105094)
RETICS # CALC: 1.56 % (ref 0.37–1.87)

## 2025-07-01 PROCEDURE — 96365 THER/PROPH/DIAG IV INF INIT: CPT

## 2025-07-01 PROCEDURE — 96367 TX/PROPH/DG ADDL SEQ IV INF: CPT

## 2025-07-01 PROCEDURE — 85045 AUTOMATED RETICULOCYTE COUNT: CPT

## 2025-07-01 PROCEDURE — 82784 ASSAY IGA/IGD/IGG/IGM EACH: CPT

## 2025-07-01 PROCEDURE — 96366 THER/PROPH/DIAG IV INF ADDON: CPT

## 2025-07-01 RX ORDER — SODIUM CHLORIDE 9 MG/ML
20 INJECTION, SOLUTION INTRAVENOUS ONCE
Status: COMPLETED | OUTPATIENT
Start: 2025-07-01 | End: 2025-07-01

## 2025-07-01 RX ORDER — ACETAMINOPHEN 325 MG/1
650 TABLET ORAL ONCE
OUTPATIENT
Start: 2025-07-29

## 2025-07-01 RX ORDER — SODIUM CHLORIDE 9 MG/ML
20 INJECTION, SOLUTION INTRAVENOUS ONCE
OUTPATIENT
Start: 2025-07-29

## 2025-07-01 RX ORDER — ACETAMINOPHEN 325 MG/1
650 TABLET ORAL ONCE
Status: COMPLETED | OUTPATIENT
Start: 2025-07-01 | End: 2025-07-01

## 2025-07-01 RX ADMIN — Medication 40 G: at 09:29

## 2025-07-01 RX ADMIN — ACETAMINOPHEN 650 MG: 325 TABLET, FILM COATED ORAL at 08:41

## 2025-07-01 RX ADMIN — SODIUM CHLORIDE 20 ML/HR: 0.9 INJECTION, SOLUTION INTRAVENOUS at 08:30

## 2025-07-01 RX ADMIN — DEXAMETHASONE SODIUM PHOSPHATE 4 MG: 10 INJECTION, SOLUTION INTRAMUSCULAR; INTRAVENOUS at 08:41

## 2025-07-01 RX ADMIN — DIPHENHYDRAMINE HYDROCHLORIDE 12.5 MG: 50 INJECTION INTRAMUSCULAR; INTRAVENOUS at 09:01

## 2025-07-01 NOTE — PLAN OF CARE
Problem: INFECTION - ADULT  Goal: Absence or prevention of progression during hospitalization  Description: INTERVENTIONS:  - Assess and monitor for signs and symptoms of infection  - Monitor lab/diagnostic results  - Monitor all insertion sites, i.e. indwelling lines, tubes, and drains  - Monitor endotracheal if appropriate and nasal secretions for changes in amount and color  - [place] appropriate cooling/warming therapies per order  - Administer medications as ordered  - Instruct and encourage patient and family to use good hand hygiene technique  - Identify and instruct in appropriate isolation precautions for identified infection/condition  Outcome: Progressing  Goal: Absence of fever/infection during neutropenic period  Description: INTERVENTIONS:  - Monitor WBC  - Perform strict hand hygiene  - Limit to healthy visitors only  - No plants, dried, fresh or silk flowers with valle in patient room  Outcome: Progressing     Problem: Knowledge Deficit  Goal: Patient/family/caregiver demonstrates understanding of disease process, treatment plan, medications, and discharge instructions  Description: Complete learning assessment and assess knowledge base.  Interventions:  - Provide teaching at level of understanding  - Provide teaching via preferred learning methods  Outcome: Progressing      From: Mitali Gonzales  To: Gatito Rene MD  Sent: 3/26/2024 12:28 PM EDT  Subject: Paola needs a refill    Niyah Guevara needs to have this prescription refilled. Doctor has her taking two a day now instead of one, so he might need to change this as well. Thanks     icosapent ethyL 1 gram capsule    Joseph

## 2025-07-01 NOTE — PROGRESS NOTES
Pt went to ED for chest pain on 6/29.  Testing negative.  Pt. Verbalizes no further chest pain and denies chest pain at this time.  IVIG ordered for infusion today. Labs obtained ordered q 4 weeks that were not obtained with ED visit. PO tylenol given as ordered. Pt. Will complete IV Benadryl and Decadron prior to IVIG.

## 2025-07-01 NOTE — PROGRESS NOTES
Pt. Tolerated IVIG without adverse event.  Future appointments reviewed. Pt. Will return 7/15/25 at 0800. AVS declined.

## 2025-07-01 NOTE — PLAN OF CARE
Problem: INFECTION - ADULT  Goal: Absence or prevention of progression during hospitalization  Description: INTERVENTIONS:  - Assess and monitor for signs and symptoms of infection  - Monitor lab/diagnostic results  - Monitor all insertion sites, i.e. indwelling lines, tubes, and drains  - Monitor endotracheal if appropriate and nasal secretions for changes in amount and color  - [place] appropriate cooling/warming therapies per order  - Administer medications as ordered  - Instruct and encourage patient and family to use good hand hygiene technique  - Identify and instruct in appropriate isolation precautions for identified infection/condition  7/1/2025 0912 by Lian Dominguez RN  Outcome: Progressing  7/1/2025 0911 by Lian Dominguez RN  Outcome: Progressing  Goal: Absence of fever/infection during neutropenic period  Description: INTERVENTIONS:  - Monitor WBC  - Perform strict hand hygiene  - Limit to healthy visitors only  - No plants, dried, fresh or silk flowers with valle in patient room  7/1/2025 0912 by Lian Dominguez RN  Outcome: Progressing  7/1/2025 0911 by Lian Dominguez RN  Outcome: Progressing     Problem: Knowledge Deficit  Goal: Patient/family/caregiver demonstrates understanding of disease process, treatment plan, medications, and discharge instructions  Description: Complete learning assessment and assess knowledge base.  Interventions:  - Provide teaching at level of understanding  - Provide teaching via preferred learning methods  7/1/2025 0912 by Lian Dominguez RN  Outcome: Progressing  7/1/2025 0911 by Lian Dominguez RN  Outcome: Progressing

## 2025-07-02 ENCOUNTER — TELEPHONE (OUTPATIENT)
Age: 70
End: 2025-07-02

## 2025-07-02 DIAGNOSIS — R76.8 LOW SERUM IGG FOR AGE: ICD-10-CM

## 2025-07-02 DIAGNOSIS — C91.10 CLL (CHRONIC LYMPHOCYTIC LEUKEMIA) (HCC): Primary | ICD-10-CM

## 2025-07-02 DIAGNOSIS — D80.1 HYPOGAMMAGLOBULINEMIA, ACQUIRED (HCC): ICD-10-CM

## 2025-07-02 NOTE — TELEPHONE ENCOUNTER
Patient called, would like to be scheduled sooner:    Reason for needing a sooner appointment:   Symptoms: 6/29 was seen in EMERGENCY DEPARTMENT for chest pain and was advised to follow up with Primary Care Provider.  He has questions for his Primary Care Provider and feels he should be seen sooner. And is requesting a sooner appointment  The week of July 7th if possible.      Current Appointment date: 7/30 10:15am   PCP ONLY or Okay with seeing another provider    Is the patient Currently on the wait list? Yes or No - He said he received a text message that an appointment became available on 7/7, he clicked on the link, rescheduled his appointment via the link for 7/7.  He said he received a 2nd message that due to no response,  he is still scheduled for the 30th.  He does not understand why the link that was sent did not work?  Is not happy about this.    Please review with provider if able to accommodate a sooner appointment.   Call back# [phone]   No

## 2025-07-07 ENCOUNTER — OFFICE VISIT (OUTPATIENT)
Dept: FAMILY MEDICINE CLINIC | Facility: CLINIC | Age: 70
End: 2025-07-07
Payer: MEDICARE

## 2025-07-07 ENCOUNTER — NURSE TRIAGE (OUTPATIENT)
Age: 70
End: 2025-07-07

## 2025-07-07 ENCOUNTER — TELEPHONE (OUTPATIENT)
Dept: HEMATOLOGY ONCOLOGY | Facility: CLINIC | Age: 70
End: 2025-07-07

## 2025-07-07 ENCOUNTER — TELEPHONE (OUTPATIENT)
Age: 70
End: 2025-07-07

## 2025-07-07 VITALS
BODY MASS INDEX: 28.36 KG/M2 | DIASTOLIC BLOOD PRESSURE: 66 MMHG | HEIGHT: 73 IN | HEART RATE: 97 BPM | SYSTOLIC BLOOD PRESSURE: 150 MMHG | TEMPERATURE: 98.3 F | WEIGHT: 214 LBS | OXYGEN SATURATION: 97 %

## 2025-07-07 DIAGNOSIS — F11.20 NARCOTIC DEPENDENCE (HCC): ICD-10-CM

## 2025-07-07 DIAGNOSIS — K21.9 GASTROESOPHAGEAL REFLUX DISEASE WITHOUT ESOPHAGITIS: ICD-10-CM

## 2025-07-07 DIAGNOSIS — Z79.4 TYPE 2 DIABETES MELLITUS WITH DIABETIC POLYNEUROPATHY, WITH LONG-TERM CURRENT USE OF INSULIN (HCC): ICD-10-CM

## 2025-07-07 DIAGNOSIS — R07.89 ATYPICAL CHEST PAIN: ICD-10-CM

## 2025-07-07 DIAGNOSIS — E11.42 TYPE 2 DIABETES MELLITUS WITH DIABETIC POLYNEUROPATHY, WITH LONG-TERM CURRENT USE OF INSULIN (HCC): ICD-10-CM

## 2025-07-07 DIAGNOSIS — Z00.00 MEDICARE ANNUAL WELLNESS VISIT, SUBSEQUENT: Primary | ICD-10-CM

## 2025-07-07 PROCEDURE — 99214 OFFICE O/P EST MOD 30 MIN: CPT | Performed by: FAMILY MEDICINE

## 2025-07-07 PROCEDURE — G2211 COMPLEX E/M VISIT ADD ON: HCPCS | Performed by: FAMILY MEDICINE

## 2025-07-07 PROCEDURE — G0439 PPPS, SUBSEQ VISIT: HCPCS | Performed by: FAMILY MEDICINE

## 2025-07-07 NOTE — TELEPHONE ENCOUNTER
Kelsi Maria calling from [facility] trying to get an ov for pt due to inflammation esophageal. Pt established in [place] but last ov 10/18/23. There are no available appts until next year. I was trying to get gi triage nurse when twilio froze and she was gone. I tried calling her back but went into que. I sent call hub message.

## 2025-07-07 NOTE — PROGRESS NOTES
Name: Cayetano Lucero      : 1955      MRN: [id number]  Encounter Provider: Meseret Elliott DO  Encounter Date: 2025   Encounter department: [facility]  :  Assessment & Plan  Atypical chest pain  Reviewed patient's ED record.  At this time, his symptoms appear concerning for possible gastroesophageal pathology.  His cardiac testing.  Clinically stable.  He has had problems with gastric/esophageal ulceration in the past.  At this time, we will help him schedule an appointment with gastroenterology for further evaluation of his abdominal discomfort.  He was advised importance of maintaining a very strict bland diet.  Avoid specific gastroesophageal triggers.       Type 2 diabetes mellitus with diabetic polyneuropathy, with long-term current use of insulin (HCC)    Lab Results   Component Value Date    HGBA1C 6.6 (A) 2025       Orders:    Albumin / creatinine urine ratio; Future    Hemoglobin A1C; Future    Medicare annual wellness visit, subsequent         Narcotic dependence (HCC)         Gastroesophageal reflux disease without esophagitis           Depression Screening and Follow-up Plan: Patient was screened for depression during today's encounter. They screened negative with a PHQ-9 score of 0.        Preventive health issues were discussed with patient, and age appropriate screening tests were ordered as noted in patient's After Visit Summary. Personalized health advice and appropriate referrals for health education or preventive services given if needed, as noted in patient's After Visit Summary.    History of Present Illness     HPI   Patient is a 69-year-old male presents today for an ED follow-up.  He was seen in the ED secondary to development of acute chest pain.  His symptoms started abruptly on .  He states he noticed an intense pain centrally over his chest.  He did present to the ED for further evaluation.  He did have extensive  testing including cardiac  testing with troponins, EKG as well as CT of his chest.  All of his testing appeared clinically stable.  He was subsequently discharged home.  Patient Care Team:  Meseret Elliott DO as PCP - General (Family Medicine)  Halie No MD as PCP - Endocrinology (Endocrinology)  MD Winifred Jones DO Rakesh Malhotra, MD William Richard Burfeind, MD Daniel Scott Heckman, MD (Orthopedic Surgery)  Momo Ba MD (Cardiology)  Immanuel Gonsales MD (Thoracic Surgery)  Dutch Houser as   Lyndsay Siegel PA-C as Physician Assistant (Hematology and Oncology)  TEODORO Dillard as Nurse Practitioner (Endocrinology)    Review of Systems   Constitutional:  Negative for activity change, chills, fatigue and fever.   HENT:  Negative for congestion, ear pain, sinus pressure and sore throat.    Eyes:  Negative for redness, itching and visual disturbance.   Respiratory:  Negative for cough and shortness of breath.    Cardiovascular:  Negative for chest pain and palpitations.   Gastrointestinal:  Negative for abdominal pain, diarrhea and nausea.   Endocrine: Negative for cold intolerance and heat intolerance.   Genitourinary:  Negative for dysuria, flank pain and frequency.   Musculoskeletal:  Negative for arthralgias, back pain, gait problem and myalgias.   Skin:  Negative for color change.   Allergic/Immunologic: Negative for environmental allergies.   Neurological:  Negative for dizziness, numbness and headaches.   Psychiatric/Behavioral:  Negative for behavioral problems and sleep disturbance.      Medical History Reviewed by provider this encounter:  Tobacco  Allergies  Meds  Problems  Med Hx  Surg Hx  Fam Hx       Annual Wellness Visit Questionnaire   Cayetano is here for his Subsequent Wellness visit. Last Medicare Wellness visit information reviewed, patient interviewed, no change since last AWV.     Health Risk Assessment:   Patient rates overall  health as good. Patient feels that their physical health rating is much worse. Patient is satisfied with their life. Eyesight was rated as same. Hearing was rated as same. Patient feels that their emotional and mental health rating is same. Patients states they are never, rarely angry. Patient states they are often unusually tired/fatigued. Pain experienced in the last 7 days has been some. Patient's pain rating has been 8/10. Patient states that he has experienced no weight loss or gain in last 6 months.     Depression Screening:   PHQ-9 Score: 0      Fall Risk Screening:   In the past year, patient has experienced: no history of falling in past year      Home Safety:  Patient does not have trouble with stairs inside or outside of their home. Patient has no working smoke alarms and has working carbon monoxide detector. Home safety hazards include: none.     Nutrition:   Current diet is Regular.     Medications:   Patient is currently taking over-the-counter supplements. OTC medications include: see medication list. Patient is able to manage medications.     Activities of Daily Living (ADLs)/Instrumental Activities of Daily Living (IADLs):   Walk and transfer into and out of bed and chair?: Yes  Dress and groom yourself?: Yes    Bathe or shower yourself?: Yes    Feed yourself? Yes  Do your laundry/housekeeping?: Yes  Manage your money, pay your bills and track your expenses?: Yes  Make your own meals?: Yes    Do your own shopping?: Yes    Previous Hospitalizations:   Any hospitalizations or ED visits within the last 12 months?: Yes    How many hospitalizations have you had in the last year?: 3-4    Advance Care Planning:   Living will: Yes    Durable POA for healthcare: Yes    Advanced directive: Yes    Advanced directive counseling given: Yes    Five wishes given: No    Patient declined ACP directive: No    End of Life Decisions reviewed with patient: Yes    Provider agrees with end of life decisions: Yes       Cognitive Screening:   Provider or family/friend/caregiver concerned regarding cognition?: No    Preventive Screenings      Cardiovascular Screening:    General: Screening Not Indicated, History Lipid Disorder, Risks and Benefits Discussed and Screening Current    Due for: Lipid Panel      Diabetes Screening:     General: Screening Not Indicated, History Diabetes, Risks and Benefits Discussed and Screening Current      Colorectal Cancer Screening:     General: Screening Current      Prostate Cancer Screening:    General: Risks and Benefits Discussed and Screening Not Indicated      Osteoporosis Screening:    General: Screening Not Indicated, History Osteoporosis and Risks and Benefits Discussed      Abdominal Aortic Aneurysm (AAA) Screening:    Risk factors include: age between 65-74 yo and tobacco use        General: Risks and Benefits Discussed and Screening Not Indicated      Lung Cancer Screening:     General: Screening Current and Risks and Benefits Discussed      Hepatitis C Screening:    General: Screening Current and Risks and Benefits Discussed    Immunizations:  - Immunizations due: Zoster (Shingrix)    Screening, Brief Intervention, and Referral to Treatment (SBIRT)     Screening    Typical number of drinks in a week: 10    Single Item Drug Screening:  How often have you used an illegal drug (including marijuana) or a prescription medication for non-medical reasons in the past year? never    Single Item Drug Screen Score: 0  Interpretation: Negative screen for possible drug use disorder    Review of Current Opioid Use    Opioid Risk Tool (ORT) Interpretation: Complete Opioid Risk Tool (ORT)    Social Drivers of Health     Financial Resource Strain: Low Risk  (4/4/2024)    Overall Financial Resource Strain (CARDIA)     Difficulty of Paying Living Expenses: Not hard at all   Food Insecurity: No Food Insecurity (7/7/2025)    Nursing - Inadequate Food Risk Classification     Worried About Running Out of  "Food in the Last Year: Never true     Ran Out of Food in the Last Year: Never true     Ran Out of Food in the Last Year: Never true   Transportation Needs: No Transportation Needs (7/7/2025)    PRAPARE - Transportation     Lack of Transportation (Medical): No     Lack of Transportation (Non-Medical): No   Housing Stability: Low Risk  (7/7/2025)    Housing Stability Vital Sign     Unable to Pay for Housing in the Last Year: No     Number of Times Moved in the Last Year: 0     Homeless in the Last Year: No   Utilities: Not At Risk (7/7/2025)    [facility] Utilities     Threatened with loss of utilities: No     No results found.    Objective   /66 (BP Location: Left arm, Patient Position: Sitting, Cuff Size: Adult)   Pulse 97   Temp 98.3 °F (36.8 °C) (Temporal)   Ht 6' 0.5\" (1.842 m)   Wt 97.1 kg (214 lb)   SpO2 97%   BMI 28.62 kg/m²     Physical Exam  Vitals reviewed.   Constitutional:       General: He is not in acute distress.     Appearance: Normal appearance. He is well-developed.   HENT:      Head: Normocephalic and atraumatic.      Right Ear: Tympanic membrane, ear canal and external ear normal. There is no impacted cerumen.      Left Ear: Tympanic membrane, ear canal and external ear normal. There is no impacted cerumen.      Nose: Nose normal. No congestion or rhinorrhea.      Mouth/Throat:      Mouth: Mucous membranes are moist.      Pharynx: No oropharyngeal exudate or posterior oropharyngeal erythema.     Eyes:      General: No scleral icterus.        Right eye: No discharge.         Left eye: No discharge.      Extraocular Movements: Extraocular movements intact.      Conjunctiva/sclera: Conjunctivae normal.      Pupils: Pupils are equal, round, and reactive to light.     Neck:      Trachea: No tracheal deviation.     Cardiovascular:      Rate and Rhythm: Normal rate and regular rhythm.      Pulses: Normal pulses. no weak pulses.           Dorsalis pedis pulses are 2+ on the right side and 2+ on the " left side.        Posterior tibial pulses are 2+ on the right side and 2+ on the left side.      Heart sounds: Normal heart sounds. No murmur heard.     No friction rub. No gallop.   Pulmonary:      Effort: Pulmonary effort is normal. No respiratory distress.      Breath sounds: Normal breath sounds. No wheezing, rhonchi or rales.   Abdominal:      General: Bowel sounds are normal. There is no distension.      Palpations: Abdomen is soft.      Tenderness: There is no abdominal tenderness. There is no guarding or rebound.     Musculoskeletal:         General: Normal range of motion.      Cervical back: Normal range of motion and neck supple.      Right lower leg: No edema.      Left lower leg: No edema.        Feet:    Feet:      Right foot:      Skin integrity: No ulcer, skin breakdown, erythema, warmth, callus or dry skin.      Left foot:      Skin integrity: No ulcer, skin breakdown, erythema, warmth, callus or dry skin.   Lymphadenopathy:      Head:      Right side of head: No submental or submandibular adenopathy.      Left side of head: No submental or submandibular adenopathy.      Cervical: No cervical adenopathy.      Right cervical: No superficial, deep or posterior cervical adenopathy.     Left cervical: No superficial, deep or posterior cervical adenopathy.     Skin:     General: Skin is warm and dry.      Findings: No erythema.     Neurological:      General: No focal deficit present.      Mental Status: He is alert and oriented to person, place, and time.      Cranial Nerves: No cranial nerve deficit.      Sensory: Sensation is intact. No sensory deficit.      Motor: Motor function is intact.     Psychiatric:         Attention and Perception: Attention and perception normal.         Mood and Affect: Mood is not anxious or depressed.         Speech: Speech normal.         Behavior: Behavior normal.         Thought Content: Thought content normal.         Judgment: Judgment normal.     Patient's shoes and  socks removed.    Right Foot/Ankle   Right Foot Inspection  Skin Exam: skin normal and skin intact. No dry skin, no warmth, no callus, no erythema, no maceration, no abnormal color, no pre-ulcer, no ulcer and no callus.     Toe Exam: ROM and strength within normal limits.     Sensory   Vibration: intact  Proprioception: intact  Monofilament testing: intact    Vascular  Capillary refills: < 3 seconds  The right DP pulse is 2+. The right PT pulse is 2+.     Left Foot/Ankle  Left Foot Inspection  Skin Exam: skin normal and skin intact. No dry skin, no warmth, no erythema, no maceration, normal color, no pre-ulcer, no ulcer and no callus.     Toe Exam: ROM and strength within normal limits.     Sensory   Vibration: intact  Proprioception: intact  Monofilament testing: intact    Vascular  Capillary refills: < 3 seconds  The left DP pulse is 2+. The left PT pulse is 2+.     Assign Risk Category  No deformity present  Loss of protective sensation  No weak pulses  Risk: 1

## 2025-07-07 NOTE — TELEPHONE ENCOUNTER
Writer left VM for PT on 7/7/25 @ 9:30 to reconnect regarding a letter he received from S. Writer was forwarded VM left by PT over the past weekend. Writer encouraged PT to call back when appropriate and provided all contact information to do so.        Dutch Houser  Phone:[phone]  Email:[email or web address]

## 2025-07-07 NOTE — TELEPHONE ENCOUNTER
----- Message from Caroline DEL REAL sent at 7/7/2025  3:59 PM EDT -----  Kelsi Maria calling from pt's pcp trying to schedule ov for inflammation esophageal. Pt last seen in [facility] on 10/18/23 and I was trying to schedule ov but christie froze and I tried calling her back to see but I was sent to 's que. Please try and reach pt if possible. Thank you

## 2025-07-07 NOTE — PATIENT INSTRUCTIONS
Medicare Preventive Visit Patient Instructions  Thank you for completing your Welcome to Medicare Visit or Medicare Annual Wellness Visit today. Your next wellness visit will be due in one year (7/8/2026).  The screening/preventive services that you may require over the next 5-10 years are detailed below. Some tests may not apply to you based off risk factors and/or age. Screening tests ordered at today's visit but not completed yet may show as past due. Also, please note that scanned in results may not display below.  Preventive Screenings:  Service Recommendations Previous Testing/Comments   Colorectal Cancer Screening  Colonoscopy    Fecal Occult Blood Test (FOBT)/Fecal Immunochemical Test (FIT)  Fecal DNA/Cologuard Test  Flexible Sigmoidoscopy Age: 45-75 years old   Colonoscopy: every 10 years (May be performed more frequently if at higher risk)  OR  FOBT/FIT: every 1 year  OR  Cologuard: every 3 years  OR  Sigmoidoscopy: every 5 years  Screening may be recommended earlier than age 45 if at higher risk for colorectal cancer. Also, an individualized decision between you and your healthcare provider will decide whether screening between the ages of 76-85 would be appropriate. Colonoscopy: 01/14/2016  FOBT/FIT: Not on file  Cologuard: Not on file  Sigmoidoscopy: Not on file    Screening Current     Prostate Cancer Screening Individualized decision between patient and health care provider in men between ages of 55-69   Medicare will cover every 12 months beginning on the day after your 50th birthday PSA: No results in last 5 years           Hepatitis C Screening Once for adults born between 1945 and 1965  More frequently in patients at high risk for Hepatitis C Hep C Antibody: 03/21/2017    Screening Current   Diabetes Screening 1-2 times per year if you're at risk for diabetes or have pre-diabetes Fasting glucose: 193 mg/dL (4/10/2025)  A1C: 6.6 (1/29/2025)  Screening Not Indicated  History Diabetes   Cholesterol  Screening Once every 5 years if you don't have a lipid disorder. May order more often based on risk factors. Lipid panel: 04/16/2024  Screening Not Indicated  History Lipid Disorder      Other Preventive Screenings Covered by Medicare:  Abdominal Aortic Aneurysm (AAA) Screening: covered once if your at risk. You're considered to be at risk if you have a family history of AAA or a male between the age of 65-75 who smoking at least 100 cigarettes in your lifetime.  Lung Cancer Screening: covers low dose CT scan once per year if you meet all of the following conditions: (1) Age 55-77; (2) No signs or symptoms of lung cancer; (3) Current smoker or have quit smoking within the last 15 years; (4) You have a tobacco smoking history of at least 20 pack years (packs per day x number of years you smoked); (5) You get a written order from a healthcare provider.  Glaucoma Screening: covered annually if you're considered high risk: (1) You have diabetes OR (2) Family history of glaucoma OR (3)  aged 50 and older OR (4)  American aged 65 and older  Osteoporosis Screening: covered every 2 years if you meet one of the following conditions: (1) Have a vertebral abnormality; (2) On glucocorticoid therapy for more than 3 months; (3) Have primary hyperparathyroidism; (4) On osteoporosis medications and need to assess response to drug therapy.  HIV Screening: covered annually if you're between the age of 15-65. Also covered annually if you are younger than 15 and older than 65 with risk factors for HIV infection. For pregnant patients, it is covered up to 3 times per pregnancy.    Immunizations:  Immunization Recommendations   Influenza Vaccine Annual influenza vaccination during flu season is recommended for all persons aged >= 6 months who do not have contraindications   Pneumococcal Vaccine   * Pneumococcal conjugate vaccine = PCV13 (Prevnar 13), PCV15 (Vaxneuvance), PCV20 (Prevnar 20)  * Pneumococcal  polysaccharide vaccine = PPSV23 (Pneumovax) Adults 19-63 yo with certain risk factors or if 65+ yo  If never received any pneumonia vaccine: recommend Prevnar 20 (PCV20)  Give PCV20 if previously received 1 dose of PCV13 or PPSV23   Hepatitis B Vaccine 3 dose series if at intermediate or high risk (ex: diabetes, end stage renal disease, liver disease)   Respiratory syncytial virus (RSV) Vaccine - COVERED BY MEDICARE PART D  * RSVPreF3 (Arexvy) CDC recommends that adults 60 years of age and older may receive a single dose of RSV vaccine using shared clinical decision-making (SCDM)   Tetanus (Td) Vaccine - COST NOT COVERED BY MEDICARE PART B Following completion of primary series, a booster dose should be given every 10 years to maintain immunity against tetanus. Td may also be given as tetanus wound prophylaxis.   Tdap Vaccine - COST NOT COVERED BY MEDICARE PART B Recommended at least once for all adults. For pregnant patients, recommended with each pregnancy.   Shingles Vaccine (Shingrix) - COST NOT COVERED BY MEDICARE PART B  2 shot series recommended in those 19 years and older who have or will have weakened immune systems or those 50 years and older     Health Maintenance Due:      Topic Date Due   • Colorectal Cancer Screening  01/14/2026   • Lung Cancer Screening  06/29/2026   • Hepatitis C Screening  Completed     Immunizations Due:      Topic Date Due   • COVID-19 Vaccine (1) Never done   • Influenza Vaccine (1) 09/01/2025     Advance Directives   What are advance directives?  Advance directives are legal documents that state your wishes and plans for medical care. These plans are made ahead of time in case you lose your ability to make decisions for yourself. Advance directives can apply to any medical decision, such as the treatments you want, and if you want to donate organs.   What are the types of advance directives?  There are many types of advance directives, and each state has rules about how to use  them. You may choose a combination of any of the following:  Living will:  This is a written record of the treatment you want. You can also choose which treatments you do not want, which to limit, and which to stop at a certain time. This includes surgery, medicine, IV fluid, and tube feedings.   Durable power of  for healthcare (DPAHC):  This is a written record that states who you want to make healthcare choices for you when you are unable to make them for yourself. This person, called a proxy, is usually a family member or a friend. You may choose more than 1 proxy.  Do not resuscitate (DNR) order:  A DNR order is used in case your heart stops beating or you stop breathing. It is a request not to have certain forms of treatment, such as CPR. A DNR order may be included in other types of advance directives.  Medical directive:  This covers the care that you want if you are in a coma, near death, or unable to make decisions for yourself. You can list the treatments you want for each condition. Treatment may include pain medicine, surgery, blood transfusions, dialysis, IV or tube feedings, and a ventilator (breathing machine).  Values history:  This document has questions about your views, beliefs, and how you feel and think about life. This information can help others choose the care that you would choose.  Why are advance directives important?  An advance directive helps you control your care. Although spoken wishes may be used, it is better to have your wishes written down. Spoken wishes can be misunderstood, or not followed. Treatments may be given even if you do not want them. An advance directive may make it easier for your family to make difficult choices about your care.   Weight Management   Why it is important to manage your weight:  Being overweight increases your risk of health conditions such as heart disease, high blood pressure, type 2 diabetes, and certain types of cancer. It can also  increase your risk for osteoarthritis, sleep apnea, and other respiratory problems. Aim for a slow, steady weight loss. Even a small amount of weight loss can lower your risk of health problems.  How to lose weight safely:  A safe and healthy way to lose weight is to eat fewer calories and get regular exercise. You can lose up about 1 pound a week by decreasing the number of calories you eat by 500 calories each day.   Healthy meal plan for weight management:  A healthy meal plan includes a variety of foods, contains fewer calories, and helps you stay healthy. A healthy meal plan includes the following:  Eat whole-grain foods more often.  A healthy meal plan should contain fiber. Fiber is the part of grains, fruits, and vegetables that is not broken down by your body. Whole-grain foods are healthy and provide extra fiber in your diet. Some examples of whole-grain foods are whole-wheat breads and pastas, oatmeal, brown rice, and bulgur.  Eat a variety of vegetables every day.  Include dark, leafy greens such as spinach, kale, eleanor greens, and mustard greens. Eat yellow and orange vegetables such as carrots, sweet potatoes, and winter squash.   Eat a variety of fruits every day.  Choose fresh or canned fruit (canned in its own juice or light syrup) instead of juice. Fruit juice has very little or no fiber.  Eat low-fat dairy foods.  Drink fat-free (skim) milk or 1% milk. Eat fat-free yogurt and low-fat cottage cheese. Try low-fat cheeses such as mozzarella and other reduced-fat cheeses.  Choose meat and other protein foods that are low in fat.  Choose beans or other legumes such as split peas or lentils. Choose fish, skinless poultry (chicken or turkey), or lean cuts of red meat (beef or pork). Before you cook meat or poultry, cut off any visible fat.   Use less fat and oil.  Try baking foods instead of frying them. Add less fat, such as margarine, sour cream, regular salad dressing and mayonnaise to foods. Eat  fewer high-fat foods. Some examples of high-fat foods include french fries, doughnuts, ice cream, and cakes.  Eat fewer sweets.  Limit foods and drinks that are high in sugar. This includes candy, cookies, regular soda, and sweetened drinks.  Exercise:  Exercise at least 30 minutes per day on most days of the week. Some examples of exercise include walking, biking, dancing, and swimming. You can also fit in more physical activity by taking the stairs instead of the elevator or parking farther away from stores. Ask your healthcare provider about the best exercise plan for you.   Narcotic (Opioid) Safety    Use narcotics safely:  Take prescribed narcotics exactly as directed  Do not give narcotics to others or take narcotics that belong to someone else  Do not mix narcotics without medicines or alcohol  Do not drive or operate heavy machinery after you take the narcotic  Monitor for side effects and notify your healthcare provider if you experienced side effects such as nausea, sleepiness, itching, or trouble thinking clearly.    Manage constipation:    Constipation is the most common side effect of narcotic medicine. Constipation is when you have hard, dry bowel movements, or you go longer than usual between bowel movements. Tell your healthcare provider about all changes in your bowel movements while you are taking narcotics. He or she may recommend laxative medicine to help you have a bowel movement. He or she may also change the kind of narcotic you are taking, or change when you take it. The following are more ways you can prevent or relieve constipation:    Drink liquids as directed.  You may need to drink extra liquids to help soften and move your bowels. Ask how much liquid to drink each day and which liquids are best for you.  Eat high-fiber foods.  This may help decrease constipation by adding bulk to your bowel movements. High-fiber foods include fruits, vegetables, whole-grain breads and cereals, and  beans. Your healthcare provider or dietitian can help you create a high-fiber meal plan. Your provider may also recommend a fiber supplement if you cannot get enough fiber from food.  Exercise regularly.  Regular physical activity can help stimulate your intestines. Walking is a good exercise to prevent or relieve constipation. Ask which exercises are best for you.  Schedule a time each day to have a bowel movement.  This may help train your body to have regular bowel movements. Bend forward while you are on the toilet to help move the bowel movement out. Sit on the toilet for at least 10 minutes, even if you do not have a bowel movement.    Store narcotics safely:   Store narcotics where others cannot easily get them.  Keep them in a locked cabinet or secure area. Do not  keep them in a purse or other bag you carry with you. A person may be looking for something else and find the narcotics.  Make sure narcotics are stored out of the reach of children.  A child can easily overdose on narcotics. Narcotics may look like candy to a small child.    The best way to dispose of narcotics:      The laws vary by country and area. In the [place], the best way is to return the narcotics through a take-back program. This program is offered by the [place] Drug Enforcement Agency (TOMMY). The following are options for using the program:  Take the narcotics to a TOMMY collection site.  The site is often a law enforcement center. Call your local law enforcement center for scheduled take-back days in your area. You will be given information on where to go if the collection site is in a different location.  Take the narcotics to an approved pharmacy or hospital.  A pharmacy or hospital may be set up as a collection site. You will need to ask if it is a [employer] collection site if you were not directed there. A pharmacy or doctor's office may not be able to take back narcotics unless it is a TOMMY site.  Use a mail-back system.  This means you  are given containers to put the narcotics into. You will then mail them in the containers.  Use a take-back drop box.  This is a place to leave the narcotics at any time. People and animals will not be able to get into the box. Your local law enforcement agency can tell you where to find a drop box in your area.    Other ways to manage pain:   Ask your healthcare provider about non-narcotic medicines to control pain.  Nonprescription medicines include NSAIDs (such as ibuprofen) and acetaminophen. Prescription medicines include muscle relaxers, antidepressants, and steroids.  Pain may be managed without any medicines.  Some ways to relieve pain include massage, aromatherapy, or meditation. Physical or occupational therapy may also help.    For more information:   Drug Enforcement Administration  [address] [place] [address]  Phone: 8- 212 - 873-0210  Web Address: https://www.deadiversion.[facility] [place].gov/drug_disposal/    [place] Food and Drug Administration  [address] [place] [address]  Phone: 3- 028 - 346-5898  Web Address: http://www.fda.gov   © Copyright [employer] 2018 Information is for End User's use only and may not be sold, redistributed or otherwise used for commercial purposes. All illustrations and images included in CareNotes® are the copyrighted property of [employer] or [employer]

## 2025-07-07 NOTE — ASSESSMENT & PLAN NOTE
Lab Results   Component Value Date    HGBA1C 6.6 (A) 01/29/2025       Orders:    Albumin / creatinine urine ratio; Future    Hemoglobin A1C; Future

## 2025-07-08 ENCOUNTER — OFFICE VISIT (OUTPATIENT)
Dept: GASTROENTEROLOGY | Facility: CLINIC | Age: 70
End: 2025-07-08
Payer: MEDICARE

## 2025-07-08 VITALS
SYSTOLIC BLOOD PRESSURE: 140 MMHG | TEMPERATURE: 99.5 F | DIASTOLIC BLOOD PRESSURE: 70 MMHG | BODY MASS INDEX: 29.05 KG/M2 | WEIGHT: 217.2 LBS

## 2025-07-08 DIAGNOSIS — Z86.0100 HISTORY OF COLON POLYPS: ICD-10-CM

## 2025-07-08 DIAGNOSIS — K21.00 GASTROESOPHAGEAL REFLUX DISEASE WITH ESOPHAGITIS WITHOUT HEMORRHAGE: Primary | ICD-10-CM

## 2025-07-08 PROCEDURE — 99214 OFFICE O/P EST MOD 30 MIN: CPT

## 2025-07-08 RX ORDER — OMEPRAZOLE 40 MG/1
40 CAPSULE, DELAYED RELEASE ORAL DAILY
Qty: 30 CAPSULE | Refills: 3 | Status: SHIPPED | OUTPATIENT
Start: 2025-07-08

## 2025-07-08 NOTE — PROGRESS NOTES
Name: Cayetano Lucero      : 1955      MRN: [id number]  Encounter Provider: Lis Cagle PA-C  Encounter Date: 2025   Encounter department: [facility] [place]  :  Assessment & Plan  Gastroesophageal reflux disease with esophagitis without hemorrhage  Patient was in the ER 2025 due to episode of chest pressure.  He had unremarkable CBC, CMP, lipase, troponin, EKG at the time.  He recently had pneumonia, CT at that time showed interval improvement in appearance of the lungs.  Patient does have remote history of MI s/p stent placement.  He also has a history of acid reflux with erosive esophagitis and esophageal narrowing requiring dilation.  He has a history of HSV esophagitis/ulcer in .  Given ongoing esophagitis on EGD patient referred to general surgery and is s/p paraesophageal hernia repair with partial fundoplication 2024.  Discussed with patient it is possible that symptoms are secondary to GERD/esophagitis.  Will start omeprazole 40 mg daily, continue Pepcid nightly.  Plan upper GI series for further evaluation.  Given his history I would recommend cardiac evaluation.  If unremarkable workup from their standpoint we could proceed with EGD.  Orders:    omeprazole (PriLOSEC) 40 MG capsule; Take 1 capsule (40 mg total) by mouth daily    FL upper GI UGI; Future    History of colon polyps  Discussed that patient would be due for colon cancer screening this time.  He would like to hold off on any further screening including colonoscopy and Cologuard at this time.         Follow-up 3 months    History of Present Illness   HPI  Cayetano Lucero is a 69 y.o. male with PMH of CAD, HTN, COPD, GERD, type 2 diabetes, CKD 3, CLL presenting for abnormal CT findings. The patient has a history of acid reflux with erosive esophagitis and esophageal narrowing requiring dilation as well as a history of HSV esophagitis/ulcer in 2016.  Noted to have erosive esophagitis  on 2 most recent EGDs.  Given ongoing esophagitis patient was referred to general surgery and is s/p paraesophageal hernia repair with partial fundoplication 6/2024.    Patient states he had an episode of chest pain after walking outside.  He described this as an intense pressure.  He then took his blood pressure which was elevated at home and called EMS.  He was in the ER 6/29/2025 and labs including CBC, CMP, lipase, troponin were unremarkable at that time.  This has since resolved and no further episodes of chest pain.  He has been taking famotidine nightly since then.    He states that occasionally food gets stuck when swallowing.  This has been ongoing for a while, previously had EGD with dilation in the past for this.  He denies any nausea, vomiting, weight loss, heartburn, reflux, odynophagia.    Prior imaging/procedures:  CT chest without contrast 6/2025: Interval improvement in the appearance of the lungs with resolution of prior airspace opacities.  Persistent 6 mm nodule.  CT chest with contrast 4/2025: Irregular lower lobe opacities, partially groundglass opacity of the right upper lung, reticular opacities in the lower lobes and right upper lobe indicating component of underlying interstitial lung disease.  Bronchial wall thickening with some mucous plugging.  7 mm left upper lobe groundglass nodule.  Circumferential wall thickening of the distal esophagus.  Barium swallow 6/2024: No evidence of leak s/p hernia surgery  EGD 4/2024: White plaques in esophagus, grade C esophagitis, large hiatal hernia.  Subcentimeter fundic gland polyp in the stomach, normal duodenum (esophageal biopsy with fungal elements compatible with Candida)  EGD 6/2022: Grade C esophagitis s/p dilation of 19 mm, large hiatal hernia, and stomach and duodenum normal  Colonoscopy 1/2016: 2 subcentimeter polyps, sigmoid diverticulosis    Review of Systems   Constitutional:  Negative for appetite change, chills and fever.   Respiratory:   Negative for shortness of breath.    Gastrointestinal:  Negative for abdominal pain, blood in stool, constipation, diarrhea, nausea and vomiting.     Medical History Reviewed by provider this encounter:  Tobacco  Allergies  Meds  Problems  Med Hx  Surg Hx  Fam Hx     .  Medications Ordered Prior to Encounter[1]   Social History[2]     Objective   /70 (BP Location: Right arm, Patient Position: Sitting, Cuff Size: Standard)   Temp 99.5 °F (37.5 °C) (Tympanic)   Wt 98.5 kg (217 lb 3.2 oz)   BMI 29.05 kg/m²      Physical Exam  Vitals reviewed.   Constitutional:       General: He is not in acute distress.     Appearance: He is not ill-appearing.   HENT:      Head: Normocephalic and atraumatic.     Cardiovascular:      Rate and Rhythm: Normal rate and regular rhythm.   Pulmonary:      Effort: Pulmonary effort is normal.      Breath sounds: Normal breath sounds.   Abdominal:      General: Abdomen is flat. Bowel sounds are normal. There is no distension.      Palpations: Abdomen is soft.      Tenderness: There is no abdominal tenderness.     Skin:     General: Skin is warm and dry.     Neurological:      Mental Status: He is alert. Mental status is at baseline.                  [1]   Current Outpatient Medications on File Prior to Visit   Medication Sig Dispense Refill    amLODIPine (NORVASC) 10 mg tablet TAKE 1 TABLET DAILY 90 tablet 1    aspirin 81 MG tablet Take 81 mg by mouth in the morning. Every other day.      benzonatate (TESSALON) 200 MG capsule Take 1 capsule (200 mg total) by mouth 3 (three) times a day as needed for cough 21 capsule 1    Blood Glucose Monitoring Suppl (FreeStyle Freedom Lite) w/Device KIT Use 3 (three) times a day 1 kit 0    citalopram (CeleXA) 40 mg tablet TAKE 1 TABLET DAILY 90 tablet 1    Continuous Blood Gluc  (FreeStyle Ashley 2 [place]) JOE Use to scan sensor premeals and at bedtime 1 each 1    Continuous Glucose Sensor (FreeStyle Ashley 2 Sensor) MISC Apply 1 sensor  every 14 days. Use as directed with Freestyle Ashley 2 reader. 6 each 1    Diclofenac Sodium (VOLTAREN) 1 % Apply 4 g topically 4 (four) times a day 100 g 0    ezetimibe (ZETIA) 10 mg tablet Take 1 tablet (10 mg total) by mouth daily at bedtime 30 tablet 0    famotidine (PEPCID) 20 mg tablet Take 1 tablet (20 mg total) by mouth 2 (two) times a day 30 tablet 0    folic acid (FOLVITE) 1 mg tablet Take 800 mcg by mouth in the morning.      gabapentin (NEURONTIN) 600 MG tablet TAKE 1 TABLET DAILY 90 tablet 1    glucose blood (FREESTYLE LITE) test strip Check blood sugar 3 times a day 300 strip 1    Ibrutinib 140 MG CAPS Take 1 capsule (140 mg total) by mouth daily 30 capsule 5    Injection Device for Insulin (CeQur Simplicity 2U) JOE Use 1 patch every 3 days, disp 1 box of 10 patches for 30 days supply 1 each 3    Injection Device for Insulin (CeQur Simplicity ) MISC Use to insert simplicity device. 1 each 1    insulin degludec (Tresiba FlexTouch) 100 units/mL injection pen Inject 14 units Sub-Q daily as directed 15 mL 5    Insulin Pen Needle (BD Pen Needle Inez U/F) 32G X 4 MM MISC Use 3 (three) times a day 300 each 1    Lancets (FREESTYLE) lancets Use as instructed--test 2 times a day    E 11.9 100 each 0    Lancets (freestyle) lancets TEST BLOOD SUGAR 3 TIMES A  each 1    LORazepam (ATIVAN) 0.5 mg tablet Take 1 tablet (0.5 mg total) by mouth daily as needed for anxiety 30 tablet 0    losartan (COZAAR) 100 MG tablet TAKE 1 TABLET DAILY 60 tablet 5    mometasone-formoterol (Dulera) 200-5 MCG/ACT inhaler Inhale 2 puffs 2 (two) times a day Rinse mouth after use. 13 g 1    multivitamin (THERAGRAN) TABS Take 1 tablet by mouth in the morning.      naloxone (NARCAN) 4 mg/0.1 mL nasal spray Administer 1 spray into a nostril. If no response after 2-3 minutes, give another dose in the other nostril using a new spray. 1 each 1    NovoLOG FlexPen 100 units/mL injection pen Inject 14-18 units TID before meals. Max  dose of 54 units daily. 15 mL 2    obinutuzumab (GAZYVA) 1000 mg/40 mL SOLN Inject into a catheter in a vein      oxyCODONE (ROXICODONE) 10 MG TABS Take 1 tablet (10 mg total) by mouth every 6 (six) hours as needed for moderate pain Max Daily Amount: 40 mg 90 tablet 0    predniSONE 5 mg tablet Take 1 tablet (5 mg total) by mouth daily 90 tablet 0    acyclovir (ZOVIRAX) 400 MG tablet TAKE 1 TABLET TWICE A DAY 60 tablet 11    hydroCHLOROthiazide 12.5 mg tablet Take 1 tablet (12.5 mg total) by mouth daily Do not start before 2025. (Patient not taking: Reported on 2025) 30 tablet 0    [DISCONTINUED] Vonoprazan Fumarate 20 MG TABS Take 20 mg by mouth in the morning (Patient not taking: Reported on 2025) 30 tablet 5     No current facility-administered medications on file prior to visit.   [2]   Social History  Tobacco Use    Smoking status: Former     Current packs/day: 0.00     Average packs/day: 2.0 packs/day for 33.0 years (66.0 ttl pk-yrs)     Types: Cigarettes     Start date:      Quit date:      Years since quittin.5     Passive exposure: Past    Smokeless tobacco: Never   Vaping Use    Vaping status: Never Used   Substance and Sexual Activity    Alcohol use: Yes     Alcohol/week: 2.0 standard drinks of alcohol     Types: 2 Cans of beer per week     Comment: social use as per [employer]    Drug use: Never

## 2025-07-08 NOTE — ASSESSMENT & PLAN NOTE
Patient was in the ER 6/29/2025 due to episode of chest pressure.  He had unremarkable CBC, CMP, lipase, troponin, EKG at the time.  He recently had pneumonia, CT at that time showed interval improvement in appearance of the lungs.  Patient does have remote history of MI s/p stent placement.  He also has a history of acid reflux with erosive esophagitis and esophageal narrowing requiring dilation.  He has a history of HSV esophagitis/ulcer in 2016.  Given ongoing esophagitis on EGD patient referred to general surgery and is s/p paraesophageal hernia repair with partial fundoplication 6/2024.  Discussed with patient it is possible that symptoms are secondary to GERD/esophagitis.  Will start omeprazole 40 mg daily, continue Pepcid nightly.  Plan upper GI series for further evaluation.  Given his history I would recommend cardiac evaluation.  If unremarkable workup from their standpoint we could proceed with EGD.  Orders:    omeprazole (PriLOSEC) 40 MG capsule; Take 1 capsule (40 mg total) by mouth daily    FL upper GI UGI; Future

## 2025-07-11 DIAGNOSIS — C91.10 CLL (CHRONIC LYMPHOCYTIC LEUKEMIA) (HCC): ICD-10-CM

## 2025-07-11 RX ORDER — ACETAMINOPHEN 325 MG/1
650 TABLET ORAL ONCE
Status: CANCELLED | OUTPATIENT
Start: 2025-07-15

## 2025-07-11 RX ORDER — PREDNISONE 5 MG/1
5 TABLET ORAL DAILY
Qty: 90 TABLET | Refills: 0 | Status: SHIPPED | OUTPATIENT
Start: 2025-07-11

## 2025-07-11 RX ORDER — SODIUM CHLORIDE 9 MG/ML
20 INJECTION, SOLUTION INTRAVENOUS ONCE
Status: CANCELLED | OUTPATIENT
Start: 2025-07-15

## 2025-07-14 ENCOUNTER — HOSPITAL ENCOUNTER (OUTPATIENT)
Dept: RADIOLOGY | Facility: HOSPITAL | Age: 70
Discharge: HOME | End: 2025-07-14
Payer: MEDICARE

## 2025-07-14 DIAGNOSIS — K21.00 GASTROESOPHAGEAL REFLUX DISEASE WITH ESOPHAGITIS WITHOUT HEMORRHAGE: ICD-10-CM

## 2025-07-14 PROCEDURE — 74240 X-RAY XM UPR GI TRC 1CNTRST: CPT

## 2025-07-15 ENCOUNTER — HOSPITAL ENCOUNTER (OUTPATIENT)
Dept: INFUSION CENTER | Facility: CLINIC | Age: 70
Discharge: HOME | End: 2025-07-15
Attending: INTERNAL MEDICINE
Payer: MEDICARE

## 2025-07-15 VITALS
HEART RATE: 63 BPM | HEIGHT: 73 IN | WEIGHT: 216.93 LBS | TEMPERATURE: 97 F | RESPIRATION RATE: 18 BRPM | SYSTOLIC BLOOD PRESSURE: 134 MMHG | DIASTOLIC BLOOD PRESSURE: 87 MMHG | BODY MASS INDEX: 28.75 KG/M2

## 2025-07-15 DIAGNOSIS — C91.10 CLL (CHRONIC LYMPHOCYTIC LEUKEMIA) (HCC): Primary | ICD-10-CM

## 2025-07-15 LAB
ALBUMIN SERPL BCG-MCNC: 3.9 G/DL (ref 3.5–5)
ALP SERPL-CCNC: 64 U/L (ref 34–104)
ALT SERPL W P-5'-P-CCNC: 36 U/L (ref 7–52)
ANION GAP SERPL CALCULATED.3IONS-SCNC: 3 MMOL/L (ref 4–13)
AST SERPL W P-5'-P-CCNC: 29 U/L (ref 13–39)
BASOPHILS # BLD MANUAL: 0.05 THOUSAND/UL (ref 0–0.1)
BASOPHILS NFR MAR MANUAL: 1 % (ref 0–1)
BILIRUB SERPL-MCNC: 0.39 MG/DL (ref 0.2–1)
BUN SERPL-MCNC: 12 MG/DL (ref 5–25)
CALCIUM SERPL-MCNC: 8.6 MG/DL (ref 8.4–10.2)
CHLORIDE SERPL-SCNC: 111 MMOL/L (ref 96–108)
CO2 SERPL-SCNC: 27 MMOL/L (ref 21–32)
CREAT SERPL-MCNC: 0.8 MG/DL (ref 0.6–1.3)
EOSINOPHIL # BLD MANUAL: 0.4 THOUSAND/UL (ref 0–0.4)
EOSINOPHIL NFR BLD MANUAL: 8 % (ref 0–6)
ERYTHROCYTE [DISTWIDTH] IN BLOOD BY AUTOMATED COUNT: 14.3 % (ref 11.6–15.1)
GFR SERPL CREATININE-BSD FRML MDRD: 90 ML/MIN/1.73SQ M
GLUCOSE SERPL-MCNC: 127 MG/DL (ref 65–140)
HCT VFR BLD AUTO: 41.9 % (ref 36.5–49.3)
HGB BLD-MCNC: 14.2 G/DL (ref 12–17)
IGG SERPL-MCNC: 746 MG/DL (ref 635–1741)
LYMPHOCYTES # BLD AUTO: 1.72 THOUSAND/UL (ref 0.6–4.47)
LYMPHOCYTES # BLD AUTO: 34 % (ref 14–44)
MCH RBC QN AUTO: 30.5 PG (ref 26.8–34.3)
MCHC RBC AUTO-ENTMCNC: 33.9 G/DL (ref 31.4–37.4)
MCV RBC AUTO: 90 FL (ref 82–98)
MONOCYTES # BLD AUTO: 0.35 THOUSAND/UL (ref 0–1.22)
MONOCYTES NFR BLD: 7 % (ref 4–12)
NEUTROPHILS # BLD MANUAL: 2.53 THOUSAND/UL (ref 1.85–7.62)
NEUTS SEG NFR BLD AUTO: 50 % (ref 43–75)
PLATELET # BLD AUTO: 162 THOUSANDS/UL (ref 149–390)
PLATELET BLD QL SMEAR: ADEQUATE
PMV BLD AUTO: 11.3 FL (ref 8.9–12.7)
POTASSIUM SERPL-SCNC: 3.8 MMOL/L (ref 3.5–5.3)
PROT SERPL-MCNC: 6.2 G/DL (ref 6.4–8.4)
RBC # BLD AUTO: 4.66 MILLION/UL (ref 3.88–5.62)
RBC MORPH BLD: NORMAL
RETICS # AUTO: NORMAL 10*3/UL (ref 14356–105094)
RETICS # CALC: 1.67 % (ref 0.37–1.87)
SODIUM SERPL-SCNC: 141 MMOL/L (ref 135–147)
WBC # BLD AUTO: 5.06 THOUSAND/UL (ref 4.31–10.16)

## 2025-07-15 PROCEDURE — 82784 ASSAY IGA/IGD/IGG/IGM EACH: CPT | Performed by: PHYSICIAN ASSISTANT

## 2025-07-15 PROCEDURE — 85027 COMPLETE CBC AUTOMATED: CPT | Performed by: INTERNAL MEDICINE

## 2025-07-15 PROCEDURE — 85045 AUTOMATED RETICULOCYTE COUNT: CPT | Performed by: PHYSICIAN ASSISTANT

## 2025-07-15 PROCEDURE — 80053 COMPREHEN METABOLIC PANEL: CPT | Performed by: INTERNAL MEDICINE

## 2025-07-15 PROCEDURE — 85007 BL SMEAR W/DIFF WBC COUNT: CPT | Performed by: INTERNAL MEDICINE

## 2025-07-15 PROCEDURE — 96413 CHEMO IV INFUSION 1 HR: CPT

## 2025-07-15 PROCEDURE — 96415 CHEMO IV INFUSION ADDL HR: CPT

## 2025-07-15 PROCEDURE — 96367 TX/PROPH/DG ADDL SEQ IV INF: CPT

## 2025-07-15 RX ORDER — ACETAMINOPHEN 325 MG/1
650 TABLET ORAL ONCE
Status: COMPLETED | OUTPATIENT
Start: 2025-07-15 | End: 2025-07-15

## 2025-07-15 RX ORDER — SODIUM CHLORIDE 9 MG/ML
20 INJECTION, SOLUTION INTRAVENOUS ONCE
Status: COMPLETED | OUTPATIENT
Start: 2025-07-15 | End: 2025-07-15

## 2025-07-15 RX ADMIN — OBINUTUZUMAB 1000 MG: 1000 INJECTION, SOLUTION, CONCENTRATE INTRAVENOUS at 10:32

## 2025-07-15 RX ADMIN — DIPHENHYDRAMINE HYDROCHLORIDE 25 MG: 50 INJECTION INTRAMUSCULAR; INTRAVENOUS at 09:19

## 2025-07-15 RX ADMIN — ACETAMINOPHEN 650 MG: 325 TABLET, FILM COATED ORAL at 08:51

## 2025-07-15 RX ADMIN — DEXAMETHASONE SODIUM PHOSPHATE 20 MG: 10 INJECTION, SOLUTION INTRAMUSCULAR; INTRAVENOUS at 08:52

## 2025-07-15 RX ADMIN — SODIUM CHLORIDE 20 ML/HR: 0.9 INJECTION, SOLUTION INTRAVENOUS at 08:30

## 2025-07-29 ENCOUNTER — HOSPITAL ENCOUNTER (OUTPATIENT)
Dept: INFUSION CENTER | Facility: CLINIC | Age: 70
Discharge: HOME | End: 2025-07-29
Attending: INTERNAL MEDICINE
Payer: MEDICARE

## 2025-07-29 VITALS
HEART RATE: 97 BPM | BODY MASS INDEX: 28.49 KG/M2 | DIASTOLIC BLOOD PRESSURE: 75 MMHG | WEIGHT: 212.96 LBS | RESPIRATION RATE: 18 BRPM | TEMPERATURE: 96.6 F | SYSTOLIC BLOOD PRESSURE: 157 MMHG

## 2025-07-29 DIAGNOSIS — C91.10 CLL (CHRONIC LYMPHOCYTIC LEUKEMIA) (HCC): ICD-10-CM

## 2025-07-29 DIAGNOSIS — R76.8 LOW SERUM IGG FOR AGE: Primary | ICD-10-CM

## 2025-07-29 DIAGNOSIS — D80.1 HYPOGAMMAGLOBULINEMIA, ACQUIRED (HCC): ICD-10-CM

## 2025-07-29 PROCEDURE — 96367 TX/PROPH/DG ADDL SEQ IV INF: CPT

## 2025-07-29 PROCEDURE — 96365 THER/PROPH/DIAG IV INF INIT: CPT

## 2025-07-29 PROCEDURE — 96366 THER/PROPH/DIAG IV INF ADDON: CPT

## 2025-07-29 RX ORDER — SODIUM CHLORIDE 9 MG/ML
20 INJECTION, SOLUTION INTRAVENOUS ONCE
Status: COMPLETED | OUTPATIENT
Start: 2025-07-29 | End: 2025-07-29

## 2025-07-29 RX ORDER — SODIUM CHLORIDE 9 MG/ML
20 INJECTION, SOLUTION INTRAVENOUS ONCE
OUTPATIENT
Start: 2025-08-26

## 2025-07-29 RX ORDER — ACETAMINOPHEN 325 MG/1
650 TABLET ORAL ONCE
OUTPATIENT
Start: 2025-08-26

## 2025-07-29 RX ORDER — ACETAMINOPHEN 325 MG/1
650 TABLET ORAL ONCE
Status: COMPLETED | OUTPATIENT
Start: 2025-07-29 | End: 2025-07-29

## 2025-07-29 RX ADMIN — ACETAMINOPHEN 650 MG: 325 TABLET, FILM COATED ORAL at 08:27

## 2025-07-29 RX ADMIN — DEXAMETHASONE SODIUM PHOSPHATE 4 MG: 10 INJECTION, SOLUTION INTRAMUSCULAR; INTRAVENOUS at 08:51

## 2025-07-29 RX ADMIN — DIPHENHYDRAMINE HYDROCHLORIDE 12.5 MG: 50 INJECTION INTRAMUSCULAR; INTRAVENOUS at 08:27

## 2025-07-29 RX ADMIN — SODIUM CHLORIDE 20 ML/HR: 9 INJECTION, SOLUTION INTRAVENOUS at 08:25

## 2025-07-29 RX ADMIN — Medication 37.5 G: at 09:26

## 2025-07-30 ENCOUNTER — OFFICE VISIT (OUTPATIENT)
Dept: ENDOCRINOLOGY | Facility: CLINIC | Age: 70
End: 2025-07-30
Payer: MEDICARE

## 2025-07-30 ENCOUNTER — TELEPHONE (OUTPATIENT)
Age: 70
End: 2025-07-30

## 2025-07-30 VITALS
HEIGHT: 72 IN | SYSTOLIC BLOOD PRESSURE: 124 MMHG | OXYGEN SATURATION: 98 % | DIASTOLIC BLOOD PRESSURE: 80 MMHG | BODY MASS INDEX: 28.99 KG/M2 | HEART RATE: 60 BPM | WEIGHT: 214 LBS

## 2025-07-30 DIAGNOSIS — E11.65 TYPE 2 DIABETES MELLITUS WITH HYPERGLYCEMIA, WITH LONG-TERM CURRENT USE OF INSULIN (HCC): Primary | ICD-10-CM

## 2025-07-30 DIAGNOSIS — C91.10 CLL (CHRONIC LYMPHOCYTIC LEUKEMIA) (HCC): ICD-10-CM

## 2025-07-30 DIAGNOSIS — Z79.4 TYPE 2 DIABETES MELLITUS WITHOUT COMPLICATION, WITH LONG-TERM CURRENT USE OF INSULIN (HCC): ICD-10-CM

## 2025-07-30 DIAGNOSIS — E78.2 MIXED HYPERLIPIDEMIA: ICD-10-CM

## 2025-07-30 DIAGNOSIS — I10 PRIMARY HYPERTENSION: ICD-10-CM

## 2025-07-30 DIAGNOSIS — E11.9 TYPE 2 DIABETES MELLITUS WITHOUT COMPLICATION, WITH LONG-TERM CURRENT USE OF INSULIN (HCC): ICD-10-CM

## 2025-07-30 DIAGNOSIS — Z79.4 TYPE 2 DIABETES MELLITUS WITH HYPERGLYCEMIA, WITH LONG-TERM CURRENT USE OF INSULIN (HCC): Primary | ICD-10-CM

## 2025-07-30 LAB — SL AMB POCT HEMOGLOBIN AIC: 6.7 (ref ?–6.5)

## 2025-07-30 PROCEDURE — 99214 OFFICE O/P EST MOD 30 MIN: CPT | Performed by: INTERNAL MEDICINE

## 2025-07-30 PROCEDURE — 83036 HEMOGLOBIN GLYCOSYLATED A1C: CPT | Performed by: INTERNAL MEDICINE

## 2025-07-30 PROCEDURE — 95251 CONT GLUC MNTR ANALYSIS I&R: CPT | Performed by: INTERNAL MEDICINE

## 2025-07-30 RX ORDER — INSULIN DEGLUDEC 100 U/ML
INJECTION, SOLUTION SUBCUTANEOUS
Qty: 15 ML | Refills: 5 | Status: SHIPPED | OUTPATIENT
Start: 2025-07-30

## 2025-07-31 DIAGNOSIS — I10 ESSENTIAL HYPERTENSION: ICD-10-CM

## 2025-07-31 DIAGNOSIS — E78.5 HYPERLIPIDEMIA: ICD-10-CM

## 2025-07-31 DIAGNOSIS — I10 PRIMARY HYPERTENSION: Primary | ICD-10-CM

## 2025-07-31 RX ORDER — LOSARTAN POTASSIUM 100 MG/1
100 TABLET ORAL DAILY
Qty: 60 TABLET | Refills: 2 | Status: CANCELLED | OUTPATIENT
Start: 2025-07-31

## 2025-07-31 RX ORDER — LOSARTAN POTASSIUM 100 MG/1
TABLET ORAL
Qty: 10 TABLET | Refills: 0 | Status: SHIPPED | OUTPATIENT
Start: 2025-07-31

## 2025-07-31 RX ORDER — EZETIMIBE 10 MG/1
10 TABLET ORAL
Qty: 30 TABLET | Refills: 0 | Status: SHIPPED | OUTPATIENT
Start: 2025-07-31

## 2025-07-31 RX ORDER — AMLODIPINE BESYLATE 10 MG/1
10 TABLET ORAL DAILY
Qty: 10 TABLET | Refills: 0 | Status: SHIPPED | OUTPATIENT
Start: 2025-07-31

## 2025-08-08 RX ORDER — SODIUM CHLORIDE 9 MG/ML
20 INJECTION, SOLUTION INTRAVENOUS ONCE
Status: CANCELLED | OUTPATIENT
Start: 2025-08-12

## 2025-08-08 RX ORDER — ACETAMINOPHEN 325 MG/1
650 TABLET ORAL ONCE
Status: CANCELLED | OUTPATIENT
Start: 2025-08-12

## 2025-08-12 ENCOUNTER — HOSPITAL ENCOUNTER (OUTPATIENT)
Dept: INFUSION CENTER | Facility: CLINIC | Age: 70
Discharge: HOME | End: 2025-08-12
Attending: INTERNAL MEDICINE
Payer: MEDICARE

## (undated) DEVICE — BLADELESS OBTURATOR: Brand: WECK VISTA

## (undated) DEVICE — EXOFIN PRECISION PEN HIGH VISCOSITY TOPICAL SKIN ADHESIVE: Brand: EXOFIN PRECISION PEN, 1G

## (undated) DEVICE — COLUMN DRAPE

## (undated) DEVICE — SUT MONOCRYL 4-0 PS-2 18 IN Y496G

## (undated) DEVICE — GLOVE INDICATOR PI UNDERGLOVE SZ 8 BLUE

## (undated) DEVICE — CADIERE FORCEPS: Brand: ENDOWRIST

## (undated) DEVICE — Device: Brand: DEFENDO AIR/WATER/SUCTION AND BIOPSY VALVE

## (undated) DEVICE — STRL PENROSE DRAIN 18" X 3/4": Brand: CARDINAL HEALTH

## (undated) DEVICE — UTILITY MARKER,BLACK WITH LABELS: Brand: DEVON

## (undated) DEVICE — TIP-UP FENESTRATED GRASPER: Brand: ENDOWRIST

## (undated) DEVICE — SUT VICRYL 3-0 SH 27 IN J416H

## (undated) DEVICE — SINGLE-USE BIOPSY FORCEPS: Brand: RADIAL JAW 4

## (undated) DEVICE — NEEDLE HYPO 22G X 1-1/2 IN

## (undated) DEVICE — ARM DRAPE

## (undated) DEVICE — SINGLE-USE SYRINGE/GAUGE ASSEMBLY: Brand: ALLIANCE II

## (undated) DEVICE — KIT, BETHLEHEM THORACIC ROBOT: Brand: CARDINAL HEALTH

## (undated) DEVICE — FIRST STEP BEDSIDE KIT - STAND-UP POUCH, ENDOSCOPIC CLEANING PAD - 1 POUCH: Brand: FIRST STEP BEDSIDE KIT - STAND-UP POUCH, ENDOSCOPIC CLEANING PAD

## (undated) DEVICE — SUT ETHIBOND 0 SH 30 IN X834H

## (undated) DEVICE — ESOPHAGEAL/COLONIC/BILIARY WIREGUIDED BALLOON DILATATION CATHETER: Brand: CRE™ PRO

## (undated) DEVICE — INSUFFLATION NEEDLE TO ESTABLISH PNEUMOPERITONEUM.: Brand: INSUFFLATION NEEDLE

## (undated) DEVICE — AIR AND WATER TUBING/CAP SET FOR OLYMPUS® SCOPES: Brand: ERBE

## (undated) DEVICE — SUT ETHIBOND 2-0 SH/SH 36 IN X523H

## (undated) DEVICE — HEAVY DUTY TABLE COVER: Brand: CONVERTORS

## (undated) DEVICE — TROCAR: Brand: KII FIOS FIRST ENTRY

## (undated) DEVICE — CANNULA SEAL

## (undated) DEVICE — TUBING SUCTION 5MM X 12 FT

## (undated) DEVICE — SUT VICRYL 0 REEL 54 IN J287G

## (undated) DEVICE — 60 ML SYRINGE,REGULAR TIP: Brand: MONOJECT

## (undated) DEVICE — GAUZE SPONGES,16 PLY: Brand: CURITY

## (undated) DEVICE — MEGA SUTURECUT ND: Brand: ENDOWRIST

## (undated) DEVICE — GAUZE ROLL KITTNER

## (undated) DEVICE — GLOVE SRG BIOGEL ECLIPSE 7.5

## (undated) DEVICE — Device: Brand: OMNICLOSE TROCAR SITE CLOSURE DEVICE

## (undated) DEVICE — VESSEL SEALER EXTEND: Brand: ENDOWRIST

## (undated) DEVICE — INTENDED FOR TISSUE SEPARATION, AND OTHER PROCEDURES THAT REQUIRE A SHARP SURGICAL BLADE TO PUNCTURE OR CUT.: Brand: BARD-PARKER SAFETY BLADES SIZE 15, STERILE

## (undated) DEVICE — VISUALIZATION SYSTEM: Brand: CLEARIFY